# Patient Record
Sex: FEMALE | Race: WHITE | HISPANIC OR LATINO | Employment: OTHER | URBAN - METROPOLITAN AREA
[De-identification: names, ages, dates, MRNs, and addresses within clinical notes are randomized per-mention and may not be internally consistent; named-entity substitution may affect disease eponyms.]

---

## 2018-01-10 NOTE — MISCELLANEOUS
Message      Recorded as Task   Date: 01/11/2016 01:03 PM, Created By: Rich Rojo   Task Name: Call Back   Assigned To: [facility],Team   Regarding Patient: Madeleine Wood, Status: Active   Comment:    Rich Rojo - 11 Jan 2016 1:03 PM     TASK CREATED  Caller: Self; [phone] (Home)  Patient called the office requesting a refill of her pain medication  She is using 3-4 Hyrdrocodone/Acetaminophen daily as well as taking Ibuprofen for the surgical pain  Her pain is 5-6/10  She reports that the nausea and vomiting has resolved and she is eating well  She is moving her bowels regularly and her stool is formed  She deneis fever and reports that her incision is healing without signs of infection  Rich Rojo - 11 Jan 2016 4:15 PM     TASK EDITED  Dr Armstrong Exon will not renew the script for generic Vicodin  She will prescribe Tamadol 50 mg, take 1 tablet every 6 hours as needed for pain  #30/No refill  Ana Luisa Faustin - 11 Jan 2016 4:16 PM     TASK EDITED  NKDA  prescriptions processed via phone and patient informed  Active Problems    1  Post-operative nausea and vomiting (787 01) (R11 0)   2  Small bowel perforation (569 83) (K63 1)    Current Meds   1  Hydrochlorothiazide 25 MG Oral Tablet; TAKE 1 TABLET DAILY; Therapy: (XARSIMXT:69CQQ6811) to Recorded   2  Hydrocodone-Acetaminophen 5-325 MG Oral Tablet; TAKE 1 TO 2 TABLETS EVERY 4   TO 6 HOURS AS NEEDED FOR PAIN  NO MORE THAN 8 TABLETS PER   DAY; Therapy: (ZXCKIEGW:91MKC7077) to Recorded   3  Metoprolol Tartrate 50 MG Oral Tablet; TAKE 1 TABLET EVERY 12 HOURS DAILY; Therapy: (YCPCMTAD:88NVU8586) to Recorded   4  Ondansetron 4 MG Oral Tablet Dispersible; Place 1 tablet under tongue to dissolve every   6 hours as needed for nausea; Therapy: 97HKA7140 to (Last AF:42UVB1406)  Requested for: 51VBI0486 Ordered   5  Synthroid 100 MCG Oral Tablet (Levothyroxine Sodium); TAKE 1 TABLET DAILY;    Therapy: (HMBOJFIQ:86WDM5198) to Recorded   6  TraMADol HCl - 50 MG Oral Tablet; TAKE 1 TABLET EVERY 6 HOURS AS NEEDED FOR   PAIN;   Therapy: 12SFG8883 to (Evaluate:19Jan2016); Last Rx:11Jan2016; Status: ACTIVE -   Retrospective By Protocol Authorization Ordered    Allergies    1   No Known Drug Allergies    Signatures   Electronically signed by : Clay Mccain, ; Jan 12 2016  1:24PM EST                       (Author)

## 2018-01-15 NOTE — MISCELLANEOUS
Message      Recorded as Task   Date: 2016 01:03 PM, Created By: Umer Danica   Task Name: Call Back   Assigned To: [facility],Team   Regarding Patient: Kalyn Abdi, Status: Active   Comment:    Umer Mishra - 2016 1:03 PM     TASK CREATED  Caller: Self; [phone] (Home)  Patient called the office requesting a refill of her pain medication  She is using 3-4 Hyrdrocodone/Acetaminophen daily as well as taking Ibuprofen for the surgical pain  Her pain is 5-6/10  She reports that the nausea and vomiting has resolved and she is eating well  She is moving her bowels regularly and her stool is formed  She deneis fever and reports that her incision is healing without signs of infection  Kristenil Danica - 2016 4:15 PM     TASK EDITED  Dr Manpreet Curtis will not renew the script for generic Vicodin  She will prescribe Tamadol 50 mg, take 1 tablet every 6 hours as needed for pain  #30/No refill  Ana Luisa Faustin - 2016 4:16 PM     TASK EDITED  NKDA  prescription processed via phone and patient informed  Active Problems    1  Post-operative nausea and vomiting (787 01) (R11 0)   2  Small bowel perforation (569 83) (K63 1)    Current Meds   1  Hydrochlorothiazide 25 MG Oral Tablet; TAKE 1 TABLET DAILY; Therapy: (HJIJNJOO:83YUM6962) to Recorded   2  Hydrocodone-Acetaminophen 5-325 MG Oral Tablet; TAKE 1 TO 2 TABLETS EVERY 4   TO 6 HOURS AS NEEDED FOR PAIN  NO MORE THAN 8 TABLETS PER   DAY; Therapy: (VJJYMBJP:34KCY6384) to Recorded   3  Metoprolol Tartrate 50 MG Oral Tablet; TAKE 1 TABLET EVERY 12 HOURS DAILY; Therapy: (PSDSRRQH:11PCC1378) to Recorded   4  Ondansetron 4 MG Oral Tablet Dispersible; Place 1 tablet under tongue to dissolve every   6 hours as needed for nausea; Therapy: 64PIM4771 to (Last M88PMR8672)  Requested for: 18UQK3183 Ordered   5  Synthroid 100 MCG Oral Tablet (Levothyroxine Sodium); TAKE 1 TABLET DAILY;    Therapy: (KTXKUEZO:16DLD8711) to Recorded   6  TraMADol HCl - 50 MG Oral Tablet; TAKE 1 TABLET EVERY 6 HOURS AS NEEDED FOR   PAIN;   Therapy: 23ZYW8751 to (Evaluate:19Jan2016); Last Rx:11Jan2016; Status: ACTIVE -   Retrospective By Protocol Authorization Ordered    Allergies    1   No Known Drug Allergies    Signatures   Electronically signed by : Rexann Klinefelter, MD; Jan 14 2016  2:57PM EST

## 2018-01-17 NOTE — PROGRESS NOTES
Assessment    1  Post-operative nausea and vomiting (787 01) (R11 0)   2  History of Small bowel perforation (852 90) (K63 1)    Discussion/Summary    51-year-old female 2 weeks out from laparotomy with small bowel resection for foreign body and small bowel perforation  She is doing much better  Asymptomatic with only some mild abdominal pain status post laparotomy  Given that she is 2 weeks out from his laparotomy, she looks great  Education given  Questions answered  We will see her on an as-needed basis  Due for screening colonoscopy at age 50  Chief Complaint  Patient is here today for a second post op visit  She had an exploratory laparotomy, lysis of adhesions and excision of foreign body and repair of small bowel on 12/29/15  fever/chills - denies  nausea/vomiting - has resolved  appetite - good  pain - 2/10, she stopped using prescription pain meds on 1/11/16  bowels - daily loose brown stool, no blood or melena  incision - with steri strips open to air  Post-Op  HPI: Patient presents today for a post-op visit from an ex-lap with lysis of adhesions and excision of foreign body with repair of small bowel on 12/29/2015  3 days ago, patient had episodes of vomiting (per patient, lost count) and could not hold fluids down  Patient is unsure if she had any sick contacts  Apart from that episode, patient has been feeling well, noting no nausea or vomiting the past 3 days and ok appetitie  Patient reports she is able to eat most of a regular diet  Patient admits to mushy stools, nonbloody  Admits to some left-sided abdominal pain, but has not been taking anything for it  Denies fever/chills, CP, SOB, leg pain or swelling    51-year-old female status post exploratory laparotomy for perforated viscus with small bowel resection on December 29, 2015  She did have the episode of nausea and vomiting described above, but is feeling much better at this point  Minimal abdominal pain   Moving her bowels well  No fever no chills  Hospital Based Practices Required Assessment:   Pain Assessment   the patient states they have pain  The pain is located in the Abdomen, left-sided  The patient describes the pain as dull  (on a scale of 0 to 10, the patient rates the pain at 2 )       Review of Systems    Constitutional: no fever, not feeling poorly, no chills and no recent weight loss  Eyes: no eyesight problems and no dryness of the eyes  ENT: no sore throat and no hoarseness  Cardiovascular: no chest pain and no palpitations  Respiratory: no shortness of breath, no cough and no shortness of breath during exertion  Gastrointestinal: abdominal pain, but no nausea, no vomiting, no constipation, no diarrhea and no blood in stools  Musculoskeletal: no arthralgias and no myalgias  Integumentary: no rashes and no skin lesions  Neurological: no headache, no numbness and no tingling  Psychiatric: no anxiety and no depression  Hematologic/Lymphatic: no swollen glands and no swollen glands in the neck  Active Problems    1  Post-operative nausea and vomiting (787 01) (R11 0)    Past Medical History  Active Problems And Past Medical History Reviewed: The active problems and past medical history were reviewed and updated today  Surgical History  Surgical History Reviewed: The surgical history was reviewed and updated today  Social History    · Current every day smoker (305 1) (F17 200)   · No alcohol use  The social history was reviewed and updated today  Family History  Family History Reviewed: The family history was reviewed and updated today  Current Meds   1  Hydrochlorothiazide 25 MG Oral Tablet; TAKE 1 TABLET DAILY; Therapy: (BKSVWHDD:68REO1760) to Recorded   2  Hydrocodone-Acetaminophen 5-325 MG Oral Tablet; TAKE 1 TO 2 TABLETS EVERY 4 TO   6 HOURS AS NEEDED FOR PAIN  NO MORE THAN 8 TABLETS PER DAY; Therapy: (FAHSSYPN:39GPN5571) to Recorded   3   Metoprolol Tartrate 50 MG Oral Tablet; TAKE 1 TABLET EVERY 12 HOURS DAILY; Therapy: (GZMTUULD:92MDG5910) to Recorded   4  Ondansetron 4 MG Oral Tablet Dispersible; Place 1 tablet under tongue to dissolve every   6 hours as needed for nausea; Therapy: 57CTW8693 to (Last NB:90QEU6380)  Requested for: 20WND5962 Ordered   5  Synthroid 100 MCG Oral Tablet; TAKE 1 TABLET DAILY; Therapy: (JDIOGWMX:28OBI6418) to Recorded   6  TraMADol HCl - 50 MG Oral Tablet; TAKE 1 TABLET EVERY 6 HOURS AS NEEDED FOR   PAIN;   Therapy: 01PDH9754 to (Evaluate:19Jan2016); Last Rx:11Jan2016 Ordered    Allergies    1  No Known Drug Allergies    Vitals   Recorded: 65MJZ1119 01:39PM   Temperature 98 3 F, Tympanic   Heart Rate 72, L Radial   Pulse Quality Regular, L Radial   Respiration 18   Systolic 967, LUE, Sitting   Diastolic 72, LUE, Sitting   Height 5 ft 3 in   Weight 150 lb    BMI Calculated 26 57   BSA Calculated 1 71     Physical Exam    Constitutional   General appearance: No acute distress, well appearing and well nourished  Ears, Nose, Mouth, and Throat   External inspection of ears and nose: Normal     Oropharynx: Normal with no erythema, edema, exudate or lesions  Neck   Supple, symmetric, trachea midline, no masses The neck was supple  The neck was not swollen, was non-tender and was not warm  Pulmonary   Respiratory effort: No increased work of breathing or signs of respiratory distress  Auscultation of lungs: Clear to auscultation, equal breath sounds bilaterally, no wheezes, no rales, no rhonci  Cardiovascular   Auscultation of heart: Normal rate and rhythm, normal S1 and S2, without murmurs  Examination of extremities for edema and/or varicosities: Normal     Abdomen   Abdomen: Abnormal   The abdomen was rounded  Bowel sounds were normal  Skin findings: striae and a scar in the mid-line   The abdomen was soft and nontender  The abdomen was not firm and not rigid  No rebound tenderness  No guarding   1 cm mass palpated in the left upper quadrant  Lymphatic   Palpation of lymph nodes in neck: No lymphadenopathy  Musculoskeletal   Digits and nails: Normal without clubbing or cyanosis  Extremities: No clubbing, no cyanosis, no edema   Skin   Skin and subcutaneous tissue: Normal without rashes or lesions  Psychiatric   Orientation to person, place, and time: Normal     Mood and affect: Normal     Additional Exam:  Incision: healing well, no discharge, steri-strips remain present  Provider Comments  Provider Comments: This report has been generated by a voice recognition software system  Therefore, there may be syntax, spelling, and/or grammatical errors  Please call if you've any questions        Signatures   Electronically signed by : Huma Arenas, [facility]; Jan 14 2016  2:43PM EST                       (Author)    Electronically signed by : Milana Espinosa MD; Jan 14 2016  2:57PM EST

## 2018-01-17 NOTE — MISCELLANEOUS
Message      Recorded as Task   Date: 01/11/2016 01:03 PM, Created By: Chica Combs   Task Name: Call Back   Assigned To: [facility],Team   Regarding Patient: Buster Moore, Status: Active   Comment:    Chica Combs - 11 Jan 2016 1:03 PM     TASK CREATED  Caller: Self; [phone] (Home)  Patient called the office requesting a refill of her pain medication  She is using 3-4 Hyrdrocodone/Acetaminophen daily as well as taking Ibuprofen for the surgical pain  Her pain is 5-6/10  She reports that the nausea and vomiting has resolved and she is eating well  She is moving her bowels regularly and her stool is formed  She deneis fever and reports that her incision is healing without signs of infection  Chica Combs - 11 Jan 2016 4:15 PM     TASK EDITED  Dr Jeremie Gaffney will not renew the script for generic Vicodin  She will prescribe Tamadol 50 mg, take 1 tablet every 6 hours as needed for pain  #30/No refill  Ana Luisa Faustin - 11 Jan 2016 4:16 PM     TASK EDITED  NKDA  prescription processed via phone and patient informed  Active Problems    1  Post-operative nausea and vomiting (787 01) (R11 0)   2  Small bowel perforation (569 83) (K63 1)    Current Meds   1  Hydrochlorothiazide 25 MG Oral Tablet; TAKE 1 TABLET DAILY; Therapy: (FYOEMPMV:46BVZ6773) to Recorded   2  Hydrocodone-Acetaminophen 5-325 MG Oral Tablet; TAKE 1 TO 2 TABLETS EVERY 4   TO 6 HOURS AS NEEDED FOR PAIN  NO MORE THAN 8 TABLETS PER   DAY; Therapy: (BQTQWITM:46ZWU0737) to Recorded   3  Metoprolol Tartrate 50 MG Oral Tablet; TAKE 1 TABLET EVERY 12 HOURS DAILY; Therapy: (EUMQMGTW:30YEA2790) to Recorded   4  Ondansetron 4 MG Oral Tablet Dispersible; Place 1 tablet under tongue to dissolve every   6 hours as needed for nausea; Therapy: 00VZK0111 to (Last MT:48DNK0822)  Requested for: 97IFC9828 Ordered   5  Synthroid 100 MCG Oral Tablet (Levothyroxine Sodium); TAKE 1 TABLET DAILY;    Therapy: (BJULPSAT:51ZQX2643) to Recorded   6  TraMADol HCl - 50 MG Oral Tablet; TAKE 1 TABLET EVERY 6 HOURS AS NEEDED FOR   PAIN;   Therapy: 76ALX4410 to (Evaluate:19Jan2016); Last Rx:11Jan2016; Status: ACTIVE -   Retrospective By Protocol Authorization Ordered    Allergies    1   No Known Drug Allergies    Signatures   Electronically signed by : Gaurav uW MD; Jan 14 2016  2:57PM EST

## 2018-01-18 NOTE — MISCELLANEOUS
Message  I called patient because she was a no show for her appointment scheduled today at [phone]  She answered the phone and stated that she just woke up and she will not be coming into the office today  She believes that the abdominal discomfort is from "gas"  She asked to reschedule the missed appointment  Appointment given on 1/14/16 @ 9461 with SOO RETANA  Active Problems   Small bowel perforation (569 83) (K63 1)       Post-operative nausea and vomiting (787 01) (R11 0)          Current Meds   1  Hydrochlorothiazide 25 MG Oral Tablet; TAKE 1 TABLET DAILY; Therapy: (SRBUWIPE:80CWR6394) to Recorded   2  Hydrocodone-Acetaminophen 5-325 MG Oral Tablet; TAKE 1 TO 2 TABLETS EVERY 4   TO 6 HOURS AS NEEDED FOR PAIN  NO MORE THAN 8 TABLETS PER   DAY; Therapy: (GVFKDKCW:66YDA1561) to Recorded   3  Metoprolol Tartrate 50 MG Oral Tablet; TAKE 1 TABLET EVERY 12 HOURS DAILY; Therapy: (QXIDJWZM:92MZJ4989) to Recorded   4  Ondansetron 4 MG Oral Tablet Dispersible; Place 1 tablet under tongue to dissolve every   6 hours as needed for nausea; Therapy: 74MGX1671 to (Last FW:57NPM6981)  Requested for: 90YPV9129 Ordered   5  Synthroid 100 MCG Oral Tablet; TAKE 1 TABLET DAILY; Therapy: (BUIJLYCD:80UJJ7747) to Recorded   6  TraMADol HCl - 50 MG Oral Tablet; TAKE 1 TABLET EVERY 6 HOURS AS NEEDED FOR   PAIN;   Therapy: 20RDO3666 to (Evaluate:19Jan2016); Last Rx:11Jan2016; Status: ACTIVE -   Retrospective By Protocol Authorization Ordered    Allergies    1   No Known Drug Allergies    Signatures   Electronically signed by : JUANITO RETANA; Jan 22 2016  8:53AM EST                       (Author)

## 2018-04-22 ENCOUNTER — APPOINTMENT (EMERGENCY)
Dept: CT IMAGING | Facility: HOSPITAL | Age: 53
End: 2018-04-22
Payer: MEDICARE

## 2018-04-22 ENCOUNTER — APPOINTMENT (EMERGENCY)
Dept: RADIOLOGY | Facility: HOSPITAL | Age: 53
End: 2018-04-22
Payer: MEDICARE

## 2018-04-22 ENCOUNTER — HOSPITAL ENCOUNTER (EMERGENCY)
Facility: HOSPITAL | Age: 53
Discharge: HOME | End: 2018-04-22
Attending: EMERGENCY MEDICINE
Payer: MEDICARE

## 2018-04-22 VITALS
TEMPERATURE: 97.8 F | DIASTOLIC BLOOD PRESSURE: 74 MMHG | HEART RATE: 86 BPM | OXYGEN SATURATION: 98 % | RESPIRATION RATE: 18 BRPM | SYSTOLIC BLOOD PRESSURE: 130 MMHG | WEIGHT: 155 LBS | BODY MASS INDEX: 27.46 KG/M2

## 2018-04-22 DIAGNOSIS — R10.9 ACUTE ABDOMINAL PAIN: Primary | ICD-10-CM

## 2018-04-22 LAB
ALBUMIN SERPL BCP-MCNC: 4.5 G/DL (ref 3.5–5)
ALP SERPL-CCNC: 97 U/L (ref 46–116)
ALT SERPL W P-5'-P-CCNC: 40 U/L (ref 12–78)
ANION GAP SERPL CALCULATED.3IONS-SCNC: 13 MMOL/L (ref 4–13)
AST SERPL W P-5'-P-CCNC: 25 U/L (ref 5–45)
BASOPHILS # BLD AUTO: 0.02 THOUSANDS/ΜL (ref 0–0.1)
BASOPHILS NFR BLD AUTO: 0 % (ref 0–1)
BILIRUB SERPL-MCNC: 0.22 MG/DL (ref 0.2–1)
BILIRUB UR QL STRIP: NEGATIVE
BUN SERPL-MCNC: 14 MG/DL (ref 5–25)
CALCIUM SERPL-MCNC: 9.2 MG/DL (ref 8.3–10.1)
CHLORIDE SERPL-SCNC: 105 MMOL/L (ref 100–108)
CLARITY UR: CLEAR
CO2 SERPL-SCNC: 26 MMOL/L (ref 21–32)
COLOR UR: YELLOW
CREAT SERPL-MCNC: 0.8 MG/DL (ref 0.6–1.3)
EOSINOPHIL # BLD AUTO: 0.09 THOUSAND/ΜL (ref 0–0.61)
EOSINOPHIL NFR BLD AUTO: 1 % (ref 0–6)
ERYTHROCYTE [DISTWIDTH] IN BLOOD BY AUTOMATED COUNT: 13 % (ref 11.6–15.1)
GFR SERPL CREATININE-BSD FRML MDRD: 86 ML/MIN/1.73SQ M
GLUCOSE SERPL-MCNC: 107 MG/DL (ref 65–140)
GLUCOSE UR STRIP-MCNC: NEGATIVE MG/DL
HCT VFR BLD AUTO: 43.1 % (ref 34.8–46.1)
HGB BLD-MCNC: 15.2 G/DL (ref 11.5–15.4)
HGB UR QL STRIP.AUTO: NEGATIVE
KETONES UR STRIP-MCNC: NEGATIVE MG/DL
LEUKOCYTE ESTERASE UR QL STRIP: NEGATIVE
LIPASE SERPL-CCNC: 492 U/L (ref 73–393)
LYMPHOCYTES # BLD AUTO: 2.95 THOUSANDS/ΜL (ref 0.6–4.47)
LYMPHOCYTES NFR BLD AUTO: 30 % (ref 14–44)
MCH RBC QN AUTO: 34.7 PG (ref 26.8–34.3)
MCHC RBC AUTO-ENTMCNC: 35.3 G/DL (ref 31.4–37.4)
MCV RBC AUTO: 98 FL (ref 82–98)
MONOCYTES # BLD AUTO: 0.77 THOUSAND/ΜL (ref 0.17–1.22)
MONOCYTES NFR BLD AUTO: 8 % (ref 4–12)
NEUTROPHILS # BLD AUTO: 6 THOUSANDS/ΜL (ref 1.85–7.62)
NEUTS SEG NFR BLD AUTO: 61 % (ref 43–75)
NITRITE UR QL STRIP: NEGATIVE
NRBC BLD AUTO-RTO: 0 /100 WBCS
PH UR STRIP.AUTO: 5 [PH] (ref 4.5–8)
PLATELET # BLD AUTO: 303 THOUSANDS/UL (ref 149–390)
PMV BLD AUTO: 9.7 FL (ref 8.9–12.7)
POTASSIUM SERPL-SCNC: 3.5 MMOL/L (ref 3.5–5.3)
PROT SERPL-MCNC: 8.9 G/DL (ref 6.4–8.2)
PROT UR STRIP-MCNC: NEGATIVE MG/DL
RBC # BLD AUTO: 4.38 MILLION/UL (ref 3.81–5.12)
SODIUM SERPL-SCNC: 144 MMOL/L (ref 136–145)
SP GR UR STRIP.AUTO: <=1.005 (ref 1–1.03)
UROBILINOGEN UR QL STRIP.AUTO: 0.2 E.U./DL
WBC # BLD AUTO: 9.83 THOUSAND/UL (ref 4.31–10.16)

## 2018-04-22 PROCEDURE — 99285 EMERGENCY DEPT VISIT HI MDM: CPT

## 2018-04-22 PROCEDURE — 74022 RADEX COMPL AQT ABD SERIES: CPT

## 2018-04-22 PROCEDURE — 81003 URINALYSIS AUTO W/O SCOPE: CPT

## 2018-04-22 PROCEDURE — 85025 COMPLETE CBC W/AUTO DIFF WBC: CPT

## 2018-04-22 PROCEDURE — 96374 THER/PROPH/DIAG INJ IV PUSH: CPT

## 2018-04-22 PROCEDURE — 96361 HYDRATE IV INFUSION ADD-ON: CPT

## 2018-04-22 PROCEDURE — C9113 INJ PANTOPRAZOLE SODIUM, VIA: HCPCS | Performed by: EMERGENCY MEDICINE

## 2018-04-22 PROCEDURE — 36415 COLL VENOUS BLD VENIPUNCTURE: CPT

## 2018-04-22 PROCEDURE — 83690 ASSAY OF LIPASE: CPT

## 2018-04-22 PROCEDURE — 80053 COMPREHEN METABOLIC PANEL: CPT

## 2018-04-22 PROCEDURE — 96375 TX/PRO/DX INJ NEW DRUG ADDON: CPT

## 2018-04-22 PROCEDURE — 96376 TX/PRO/DX INJ SAME DRUG ADON: CPT

## 2018-04-22 PROCEDURE — 74177 CT ABD & PELVIS W/CONTRAST: CPT

## 2018-04-22 RX ORDER — MAGNESIUM HYDROXIDE/ALUMINUM HYDROXICE/SIMETHICONE 120; 1200; 1200 MG/30ML; MG/30ML; MG/30ML
30 SUSPENSION ORAL ONCE
Status: COMPLETED | OUTPATIENT
Start: 2018-04-22 | End: 2018-04-22

## 2018-04-22 RX ORDER — HYDROCHLOROTHIAZIDE 25 MG/1
1 TABLET ORAL DAILY
COMMUNITY
End: 2018-05-16

## 2018-04-22 RX ORDER — ONDANSETRON 2 MG/ML
4 INJECTION INTRAMUSCULAR; INTRAVENOUS ONCE
Status: COMPLETED | OUTPATIENT
Start: 2018-04-22 | End: 2018-04-22

## 2018-04-22 RX ORDER — PANTOPRAZOLE SODIUM 40 MG/1
40 TABLET, DELAYED RELEASE ORAL
Status: ON HOLD | COMMUNITY
Start: 2017-12-14 | End: 2019-05-08 | Stop reason: SDUPTHER

## 2018-04-22 RX ORDER — ONDANSETRON 4 MG/1
4 TABLET, ORALLY DISINTEGRATING ORAL EVERY 6 HOURS PRN
Qty: 8 TABLET | Refills: 0 | Status: SHIPPED | OUTPATIENT
Start: 2018-04-22 | End: 2018-05-16

## 2018-04-22 RX ORDER — FENTANYL CITRATE 50 UG/ML
100 INJECTION, SOLUTION INTRAMUSCULAR; INTRAVENOUS ONCE
Status: COMPLETED | OUTPATIENT
Start: 2018-04-22 | End: 2018-04-22

## 2018-04-22 RX ORDER — MORPHINE SULFATE 4 MG/ML
4 INJECTION, SOLUTION INTRAMUSCULAR; INTRAVENOUS ONCE
Status: COMPLETED | OUTPATIENT
Start: 2018-04-22 | End: 2018-04-22

## 2018-04-22 RX ORDER — PANTOPRAZOLE SODIUM 40 MG/1
40 INJECTION, POWDER, FOR SOLUTION INTRAVENOUS ONCE
Status: COMPLETED | OUTPATIENT
Start: 2018-04-22 | End: 2018-04-22

## 2018-04-22 RX ORDER — METOPROLOL TARTRATE 50 MG/1
1 TABLET, FILM COATED ORAL EVERY 12 HOURS
Status: ON HOLD | COMMUNITY
End: 2019-09-26 | Stop reason: SDUPTHER

## 2018-04-22 RX ORDER — ONDANSETRON 4 MG/1
TABLET, ORALLY DISINTEGRATING ORAL
COMMUNITY
Start: 2016-01-08 | End: 2018-06-19

## 2018-04-22 RX ORDER — SUCRALFATE 1 G/1
1 TABLET ORAL
Qty: 56 TABLET | Refills: 0 | Status: ON HOLD | OUTPATIENT
Start: 2018-04-22 | End: 2019-05-23

## 2018-04-22 RX ORDER — ASPIRIN 81 MG/1
81 TABLET, CHEWABLE ORAL
COMMUNITY
Start: 2017-12-14 | End: 2018-05-16

## 2018-04-22 RX ADMIN — ONDANSETRON 4 MG: 2 INJECTION INTRAMUSCULAR; INTRAVENOUS at 11:15

## 2018-04-22 RX ADMIN — PANTOPRAZOLE SODIUM 40 MG: 40 INJECTION, POWDER, FOR SOLUTION INTRAVENOUS at 14:13

## 2018-04-22 RX ADMIN — SODIUM CHLORIDE 1000 ML: 0.9 INJECTION, SOLUTION INTRAVENOUS at 11:18

## 2018-04-22 RX ADMIN — MORPHINE SULFATE 4 MG: 4 INJECTION, SOLUTION INTRAMUSCULAR; INTRAVENOUS at 12:14

## 2018-04-22 RX ADMIN — LIDOCAINE HYDROCHLORIDE 10 ML: 20 SOLUTION ORAL; TOPICAL at 14:12

## 2018-04-22 RX ADMIN — ALUMINUM HYDROXIDE, MAGNESIUM HYDROXIDE, AND SIMETHICONE 30 ML: 200; 200; 20 SUSPENSION ORAL at 14:12

## 2018-04-22 RX ADMIN — ONDANSETRON 4 MG: 2 INJECTION INTRAMUSCULAR; INTRAVENOUS at 13:01

## 2018-04-22 RX ADMIN — IOHEXOL 50 ML: 240 INJECTION, SOLUTION INTRATHECAL; INTRAVASCULAR; INTRAVENOUS; ORAL at 13:15

## 2018-04-22 RX ADMIN — FENTANYL CITRATE 100 MCG: 50 INJECTION INTRAMUSCULAR; INTRAVENOUS at 11:19

## 2018-04-22 RX ADMIN — IOHEXOL 100 ML: 350 INJECTION, SOLUTION INTRAVENOUS at 13:16

## 2018-04-22 NOTE — DISCHARGE INSTRUCTIONS
Gastritis   WHAT YOU NEED TO KNOW:   Gastritis is inflammation or irritation of the lining of your stomach  DISCHARGE INSTRUCTIONS:   Call 911 for any of the following:   · You develop chest pain or shortness of breath  Return to the emergency department if:   · You vomit blood  · You have black or bloody bowel movements  · You have severe stomach or back pain  Contact your healthcare provider if:   · You have a fever  · You have new or worsening symptoms, even after treatment  · You have questions or concerns about your condition or care  Medicines:     Take your medicine as directed  Manage or prevent gastritis:   · Do not smoke  Nicotine and other chemicals in cigarettes and cigars can make your symptoms worse and cause lung damage  Ask your healthcare provider for information if you currently smoke and need help to quit  E-cigarettes or smokeless tobacco still contain nicotine  Talk to your healthcare provider before you use these products  · Do not drink alcohol  Alcohol can prevent healing and make your gastritis worse  Talk to your healthcare provider if you need help to stop drinking  · Do not take NSAIDs or aspirin unless directed  These and similar medicines can cause irritation  If your healthcare provider says it is okay to take NSAIDs, take them with food  · Do not eat foods that cause irritation  Foods such as oranges and salsa can cause burning or pain  Eat a variety of healthy foods  Examples include fruits (not citrus), vegetables, low-fat dairy products, beans, whole-grain breads, and lean meats and fish  Try to eat small meals, and drink water with your meals  Do not eat for at least 3 hours before you go to bed

## 2018-04-22 NOTE — ED PROVIDER NOTES
History  Chief Complaint   Patient presents with    Abdominal Pain     Pt reports abdominal pain and distention starting morning  Noticed a difference in BM for the past 3 days  Has a hx of obstructions  States has had 5 bowel surgeries  51 yo female with h/o previous bowel obstructions, c/o onset of abdominal pain and distension last night after attending a baby shower where she was eating the snack food and drank alcohol, since then c/o nausea without vomiting  She affirms she is passing flatus  She says she tends to normally have loose stools since her previous bowel surgery, and since last night she actually more formed, smaller volume which she considers abnormal           History provided by:  Patient  Abdominal Pain   Pain location:  Generalized  Pain quality: aching and bloating    Pain radiates to:  Does not radiate  Pain severity:  Severe  Onset quality:  Gradual  Duration:  12 hours  Timing:  Constant  Progression:  Worsening  Chronicity:  New  Context: previous surgery (history of hysterectomy, appendectomy, and multiple bowel obstructions)    Context: not recent illness    Relieved by:  Nothing  Worsened by: Movement (lying flat)  Ineffective treatments:  None tried  Associated symptoms: nausea    Associated symptoms: no anorexia, no chest pain, no chills, no cough, no diarrhea, no dysuria, no fever, no hematuria, no shortness of breath, no sore throat and no vomiting    Risk factors: multiple surgeries        Prior to Admission Medications   Prescriptions Last Dose Informant Patient Reported?  Taking?   aspirin 81 mg chewable tablet   Yes Yes   Sig: Chew 81 mg   hydrochlorothiazide (HYDRODIURIL) 25 mg tablet   Yes No   Sig: Take 1 tablet by mouth daily   metoprolol tartrate (LOPRESSOR) 50 mg tablet   Yes No   Sig: Take 1 tablet by mouth every 12 (twelve) hours   ondansetron (ZOFRAN-ODT) 4 mg disintegrating tablet   Yes Yes   Sig: Take by mouth   pantoprazole (PROTONIX) 40 mg tablet   Yes Yes Sig: Take 40 mg by mouth      Facility-Administered Medications: None       Past Medical History:   Diagnosis Date    Bowel obstruction ([address] )     Hypertension        Past Surgical History:   Procedure Laterality Date    BOWEL RESECTION         History reviewed  No pertinent family history  I have reviewed and agree with the history as documented  Social History   Substance Use Topics    Smoking status: Current Every Day Smoker     Packs/day: 0 50     Types: Cigarettes    Smokeless tobacco: Never Used    Alcohol use No        Review of Systems   Constitutional: Positive for appetite change  Negative for chills and fever  HENT: Negative for sore throat  Respiratory: Negative for cough, shortness of breath and wheezing  Cardiovascular: Negative for chest pain and palpitations  Gastrointestinal: Positive for abdominal pain and nausea  Negative for anorexia, diarrhea and vomiting  Genitourinary: Negative for dysuria and hematuria  Musculoskeletal: Negative for neck pain  Skin: Negative for rash  Neurological: Negative for dizziness, weakness and headaches  Psychiatric/Behavioral: Negative for suicidal ideas  All other systems reviewed and are negative  Physical Exam  ED Triage Vitals [04/22/18 1026]   Temperature Pulse Respirations Blood Pressure SpO2   97 8 °F (36 6 °C) (!) 122 18 (!) 177/89 98 %      Temp Source Heart Rate Source Patient Position - Orthostatic VS BP Location FiO2 (%)   Oral Monitor Sitting Right arm --      Pain Score       Worst Possible Pain           Orthostatic Vital Signs  Vitals:    04/22/18 1026 04/22/18 1129 04/22/18 1305   BP: (!) 177/89 140/80 131/75   Pulse: (!) 122 94 88   Patient Position - Orthostatic VS: Sitting Sitting Standing       Physical Exam   Constitutional: She is oriented to person, place, and time  Vital signs are normal  She appears well-developed and well-nourished  Non-toxic appearance  She appears distressed     Appears uncomfortable, does not want to lie back   HENT:   Head: Normocephalic and atraumatic  Right Ear: Tympanic membrane and external ear normal    Left Ear: Tympanic membrane and external ear normal    Nose: Nose normal    Mouth/Throat: Oropharynx is clear and moist    Eyes: Conjunctivae and EOM are normal  Pupils are equal, round, and reactive to light  Neck: Normal range of motion and full passive range of motion without pain  Neck supple  No Brudzinski's sign and no Kernig's sign noted  Cardiovascular: Normal rate, regular rhythm, normal heart sounds, intact distal pulses and normal pulses  No murmur heard  Pulmonary/Chest: Effort normal and breath sounds normal  No tachypnea  No respiratory distress  She has no wheezes  Abdominal: She exhibits distension (tympanic)  Bowel sounds are increased  There is generalized tenderness  There is guarding  There is no rigidity and no rebound  No hernia  Musculoskeletal: Normal range of motion  Right lower leg: She exhibits no swelling  Left lower leg: She exhibits no swelling  Lymphadenopathy:     She has no cervical adenopathy  Neurological: She is alert and oriented to person, place, and time  She has normal strength and normal reflexes  No cranial nerve deficit or sensory deficit  Coordination and gait normal  GCS eye subscore is 4  GCS verbal subscore is 5  GCS motor subscore is 6  Skin: Skin is warm and dry  No rash noted  She is not diaphoretic  No pallor  Psychiatric: She has a normal mood and affect  Her speech is normal and behavior is normal  Judgment and thought content normal  Cognition and memory are normal    Nursing note and vitals reviewed        ED Medications  Medications   sodium chloride 0 9 % bolus 1,000 mL (1,000 mL Intravenous New Bag 4/22/18 1118)   ondansetron (ZOFRAN) injection 4 mg (4 mg Intravenous Given 4/22/18 1115)   fentanyl citrate (PF) 100 MCG/2ML 100 mcg (100 mcg Intravenous Given 4/22/18 1119)   morphine (PF) 4 mg/mL injection 4 mg (4 mg Intravenous Given 4/22/18 1214)   ondansetron (ZOFRAN) injection 4 mg (4 mg Intravenous Given 4/22/18 1301)   iohexol (OMNIPAQUE) 350 MG/ML injection (SINGLE-DOSE) 100 mL (100 mL Intravenous Given 4/22/18 1316)   iohexol (OMNIPAQUE) 240 MG/ML solution 50 mL (50 mL Oral Given 4/22/18 1315)   pantoprazole (PROTONIX) injection 40 mg (40 mg Intravenous Given 4/22/18 1413)   lidocaine viscous (XYLOCAINE) 2 % mucosal solution 10 mL (10 mL Swish & Swallow Given 4/22/18 1412)   aluminum-magnesium hydroxide-simethicone (MYLANTA) 200-200-20 mg/5 mL oral suspension 30 mL (30 mL Oral Given 4/22/18 1412)       Diagnostic Studies  Results Reviewed     Procedure Component Value Units Date/Time    ED Urine Macroscopic [[id number]]  (Normal) Collected:  04/22/18 1107    Lab Status:  Final result Specimen:  Urine Updated:  04/22/18 1107     Color, UA Yellow     Clarity, UA Clear     pH, UA 5 0     Leukocytes, UA Negative     Nitrite, UA Negative     Protein, UA Negative mg/dl      Glucose, UA Negative mg/dl      Ketones, UA Negative mg/dl      Urobilinogen, UA 0 2 E U /dl      Bilirubin, UA Negative     Blood, UA Negative     Specific Gravity, UA <=1 005    Narrative:       CLINITEK RESULT    Comprehensive metabolic panel [[id number]]  (Abnormal) Collected:  04/22/18 1044    Lab Status:  Final result Specimen:  Blood from Arm, Left Updated:  04/22/18 1104     Sodium 144 mmol/L      Potassium 3 5 mmol/L      Chloride 105 mmol/L      CO2 26 mmol/L      Anion Gap 13 mmol/L      BUN 14 mg/dL      Creatinine 0 80 mg/dL      Glucose 107 mg/dL      Calcium 9 2 mg/dL      AST 25 U/L      ALT 40 U/L      Alkaline Phosphatase 97 U/L      Total Protein 8 9 (H) g/dL      Albumin 4 5 g/dL      Total Bilirubin 0 22 mg/dL      eGFR 86 ml/min/1 73sq m     Narrative:         National Kidney Disease Education Program recommendations are as follows:  GFR calculation is accurate only with a steady state creatinine  Chronic Kidney disease less than 60 ml/min/1 73 sq  meters  Kidney failure less than 15 ml/min/1 73 sq  meters  Lipase [[id number]]  (Abnormal) Collected:  04/22/18 1044    Lab Status:  Final result Specimen:  Blood from Arm, Left Updated:  04/22/18 1104     Lipase 492 (H) u/L     CBC and differential [[id number]]  (Abnormal) Collected:  04/22/18 1044    Lab Status:  Final result Specimen:  Blood from Arm, Left Updated:  04/22/18 1049     WBC 9 83 Thousand/uL      RBC 4 38 Million/uL      Hemoglobin 15 2 g/dL      Hematocrit 43 1 %      MCV 98 fL      MCH 34 7 (H) pg      MCHC 35 3 g/dL      RDW 13 0 %      MPV 9 7 fL      Platelets 796 Thousands/uL      nRBC 0 /100 WBCs      Neutrophils Relative 61 %      Lymphocytes Relative 30 %      Monocytes Relative 8 %      Eosinophils Relative 1 %      Basophils Relative 0 %      Neutrophils Absolute 6 00 Thousands/µL      Lymphocytes Absolute 2 95 Thousands/µL      Monocytes Absolute 0 77 Thousand/µL      Eosinophils Absolute 0 09 Thousand/µL      Basophils Absolute 0 02 Thousands/µL                  CT abdomen pelvis with contrast   Final Result by Eufemia Sterling MD (04/22 6597)      No acute inflammatory findings within the abdomen or pelvis  No bowel obstruction; the administered oral contrast has extended into the mid to distal small bowel  Workstation performed: [id number]         XR abdomen obstruction series   ED Interpretation by La Gaviria MD (04/22 1058)   No air fluid levels seen    Proceed with CT for abdominal pain                 Procedures  Procedures       Phone Contacts  ED Phone Contact    ED Course  ED Course as of Apr 22 1453   Sun Apr 22, 2018   1113 She is already doing better with single dose of pain medicine, and is much less distress, up and walking now to "help her intestines "  She has mildly elevated lipase, and xray was negative for any obstructive pattern, so I am doing CT with contrast for both occult obstruction and pancreatitis 12 CT is negative and lipase only mildly elevated  Her distension is corresponding more to bloating and no obstruction  She give h/o gastritis, which I am inclined to treat her for without needing admission, likely prompted by the alcohol last night, which is not a usual thing, and dietary indiscretions at the party  [facility]  CritCare Time    Disposition  Final diagnoses:   Acute abdominal pain - probable gasritis     Time reflects when diagnosis was documented in both MDM as applicable and the Disposition within this note     Time User Action Codes Description Comment    4/22/2018  2:51 PM Adolphus Dubin L Add [R10 9] Acute abdominal pain     4/22/2018  2:51 PM Tami Kerline Modify [R10 9] Acute abdominal pain probable gasritis      ED Disposition     ED Disposition Condition Comment    Discharge  Jayshree Lamb discharge to home/self care      Condition at discharge: Good        Follow-up Information     Follow up With Specialties Details Why Contact Info Additional [address], YONG Nurse Practitioner Schedule an appointment as soon as possible for a visit For followup, As needed [address] [facility] [id number]  [facility] Department Emergency Medicine  If symptoms worsen Luiz Ricketts [address] [place] [facility], [address] [facility] [address]  , [place], [place], [address]        Patient's Medications   Discharge Prescriptions    ONDANSETRON (ZOFRAN-ODT) 4 MG DISINTEGRATING TABLET    Take 1 tablet (4 mg total) by mouth every 6 (six) hours as needed for nausea or vomiting       Start Date: 4/22/2018 End Date: --       Order Dose: 4 mg       Quantity: 8 tablet    Refills: 0    SUCRALFATE (CARAFATE) 1 G TABLET    Take 1 tablet (1 g total) by mouth 4 (four) times a day (before meals and at bedtime) for 14 days       Start Date: 4/22/2018 End Date: 5/6/2018       Order Dose: 1 g       Quantity: 56 tablet    Refills: 0     No discharge procedures on file      ED Provider  Electronically Signed by           Ramon Champagne MD  04/22/18 7721

## 2018-04-22 NOTE — ED NOTES
Patient ambulatory in halls through time in ED , patient states she cannot sit, sitting causes more pain     Missy Holloway RN  04/22/18 4785

## 2018-05-16 ENCOUNTER — APPOINTMENT (EMERGENCY)
Dept: CT IMAGING | Facility: HOSPITAL | Age: 53
DRG: 392 | End: 2018-05-16
Payer: MEDICARE

## 2018-05-16 ENCOUNTER — HOSPITAL ENCOUNTER (INPATIENT)
Facility: HOSPITAL | Age: 53
LOS: 2 days | Discharge: HOME | DRG: 392 | End: 2018-05-18
Attending: EMERGENCY MEDICINE | Admitting: HOSPITALIST
Payer: MEDICARE

## 2018-05-16 DIAGNOSIS — E87.1 ACUTE HYPONATREMIA: ICD-10-CM

## 2018-05-16 DIAGNOSIS — K29.00 OTHER ACUTE GASTRITIS WITHOUT HEMORRHAGE: ICD-10-CM

## 2018-05-16 DIAGNOSIS — R10.13 ACUTE EPIGASTRIC PAIN: Primary | ICD-10-CM

## 2018-05-16 DIAGNOSIS — K27.9 PEPTIC ULCER DISEASE: ICD-10-CM

## 2018-05-16 PROBLEM — I10 ESSENTIAL HYPERTENSION: Status: ACTIVE | Noted: 2018-05-16

## 2018-05-16 PROBLEM — E86.0 DEHYDRATION: Status: ACTIVE | Noted: 2018-05-16

## 2018-05-16 PROBLEM — F41.9 ANXIETY: Status: ACTIVE | Noted: 2018-05-16

## 2018-05-16 LAB
ALBUMIN SERPL BCP-MCNC: 4.2 G/DL (ref 3.5–5)
ALP SERPL-CCNC: 79 U/L (ref 46–116)
ALT SERPL W P-5'-P-CCNC: 27 U/L (ref 12–78)
ANION GAP SERPL CALCULATED.3IONS-SCNC: 12 MMOL/L (ref 4–13)
AST SERPL W P-5'-P-CCNC: 33 U/L (ref 5–45)
ATRIAL RATE: 71 BPM
BASOPHILS # BLD AUTO: 0.03 THOUSANDS/ΜL (ref 0–0.1)
BASOPHILS NFR BLD AUTO: 0 % (ref 0–1)
BILIRUB DIRECT SERPL-MCNC: 0.12 MG/DL (ref 0–0.2)
BILIRUB SERPL-MCNC: 0.95 MG/DL (ref 0.2–1)
BILIRUB UR QL STRIP: NEGATIVE
BUN SERPL-MCNC: 8 MG/DL (ref 5–25)
CALCIUM SERPL-MCNC: 9.5 MG/DL (ref 8.3–10.1)
CHLORIDE SERPL-SCNC: 85 MMOL/L (ref 100–108)
CLARITY UR: CLEAR
CO2 SERPL-SCNC: 28 MMOL/L (ref 21–32)
COLOR UR: YELLOW
COLOR, POC: YELLOW
CREAT SERPL-MCNC: 0.73 MG/DL (ref 0.6–1.3)
EOSINOPHIL # BLD AUTO: 0.08 THOUSAND/ΜL (ref 0–0.61)
EOSINOPHIL NFR BLD AUTO: 1 % (ref 0–6)
ERYTHROCYTE [DISTWIDTH] IN BLOOD BY AUTOMATED COUNT: 12.5 % (ref 11.6–15.1)
GFR SERPL CREATININE-BSD FRML MDRD: 96 ML/MIN/1.73SQ M
GLUCOSE SERPL-MCNC: 173 MG/DL (ref 65–140)
GLUCOSE UR STRIP-MCNC: NEGATIVE MG/DL
HCT VFR BLD AUTO: 42.9 % (ref 34.8–46.1)
HGB BLD-MCNC: 15.7 G/DL (ref 11.5–15.4)
HGB UR QL STRIP.AUTO: NEGATIVE
IMM GRANULOCYTES # BLD AUTO: 0.05 THOUSAND/UL (ref 0–0.2)
IMM GRANULOCYTES NFR BLD AUTO: 0 % (ref 0–2)
KETONES UR STRIP-MCNC: NEGATIVE MG/DL
LACTATE SERPL-SCNC: 1.3 MMOL/L (ref 0.5–2)
LEUKOCYTE ESTERASE UR QL STRIP: NEGATIVE
LIPASE SERPL-CCNC: 196 U/L (ref 73–393)
LYMPHOCYTES # BLD AUTO: 2.28 THOUSANDS/ΜL (ref 0.6–4.47)
LYMPHOCYTES NFR BLD AUTO: 20 % (ref 14–44)
MCH RBC QN AUTO: 34.6 PG (ref 26.8–34.3)
MCHC RBC AUTO-ENTMCNC: 36.6 G/DL (ref 31.4–37.4)
MCV RBC AUTO: 95 FL (ref 82–98)
MONOCYTES # BLD AUTO: 0.82 THOUSAND/ΜL (ref 0.17–1.22)
MONOCYTES NFR BLD AUTO: 7 % (ref 4–12)
NEUTROPHILS # BLD AUTO: 8.14 THOUSANDS/ΜL (ref 1.85–7.62)
NEUTS SEG NFR BLD AUTO: 72 % (ref 43–75)
NITRITE UR QL STRIP: NEGATIVE
NRBC BLD AUTO-RTO: 0 /100 WBCS
P AXIS: 67 DEGREES
PH UR STRIP.AUTO: 6.5 [PH] (ref 4.5–8)
PLATELET # BLD AUTO: 278 THOUSANDS/UL (ref 149–390)
PMV BLD AUTO: 10.1 FL (ref 8.9–12.7)
POTASSIUM SERPL-SCNC: 3.4 MMOL/L (ref 3.5–5.3)
PR INTERVAL: 214 MS
PROT SERPL-MCNC: 8.4 G/DL (ref 6.4–8.2)
PROT UR STRIP-MCNC: NEGATIVE MG/DL
QRS AXIS: 54 DEGREES
QRSD INTERVAL: 76 MS
QT INTERVAL: 416 MS
QTC INTERVAL: 452 MS
RBC # BLD AUTO: 4.54 MILLION/UL (ref 3.81–5.12)
SODIUM SERPL-SCNC: 125 MMOL/L (ref 136–145)
SP GR UR STRIP.AUTO: 1.01 (ref 1–1.03)
T WAVE AXIS: 5 DEGREES
TROPONIN I SERPL-MCNC: <0.02 NG/ML
TSH SERPL DL<=0.05 MIU/L-ACNC: 2.17 UIU/ML (ref 0.36–3.74)
UROBILINOGEN UR QL STRIP.AUTO: 0.2 E.U./DL
VENTRICULAR RATE: 71 BPM
WBC # BLD AUTO: 11.35 THOUSAND/UL (ref 4.31–10.16)

## 2018-05-16 PROCEDURE — 80048 BASIC METABOLIC PNL TOTAL CA: CPT | Performed by: EMERGENCY MEDICINE

## 2018-05-16 PROCEDURE — 83605 ASSAY OF LACTIC ACID: CPT | Performed by: EMERGENCY MEDICINE

## 2018-05-16 PROCEDURE — 36415 COLL VENOUS BLD VENIPUNCTURE: CPT | Performed by: EMERGENCY MEDICINE

## 2018-05-16 PROCEDURE — 96361 HYDRATE IV INFUSION ADD-ON: CPT

## 2018-05-16 PROCEDURE — 81002 URINALYSIS NONAUTO W/O SCOPE: CPT | Performed by: EMERGENCY MEDICINE

## 2018-05-16 PROCEDURE — 93010 ELECTROCARDIOGRAM REPORT: CPT | Performed by: INTERNAL MEDICINE

## 2018-05-16 PROCEDURE — 99223 1ST HOSP IP/OBS HIGH 75: CPT | Performed by: HOSPITALIST

## 2018-05-16 PROCEDURE — 96375 TX/PRO/DX INJ NEW DRUG ADDON: CPT

## 2018-05-16 PROCEDURE — 96374 THER/PROPH/DIAG INJ IV PUSH: CPT

## 2018-05-16 PROCEDURE — 83690 ASSAY OF LIPASE: CPT | Performed by: EMERGENCY MEDICINE

## 2018-05-16 PROCEDURE — 93005 ELECTROCARDIOGRAM TRACING: CPT

## 2018-05-16 PROCEDURE — 84443 ASSAY THYROID STIM HORMONE: CPT | Performed by: EMERGENCY MEDICINE

## 2018-05-16 PROCEDURE — 84484 ASSAY OF TROPONIN QUANT: CPT | Performed by: EMERGENCY MEDICINE

## 2018-05-16 PROCEDURE — C9113 INJ PANTOPRAZOLE SODIUM, VIA: HCPCS | Performed by: HOSPITALIST

## 2018-05-16 PROCEDURE — 81003 URINALYSIS AUTO W/O SCOPE: CPT

## 2018-05-16 PROCEDURE — 99285 EMERGENCY DEPT VISIT HI MDM: CPT

## 2018-05-16 PROCEDURE — 80076 HEPATIC FUNCTION PANEL: CPT | Performed by: EMERGENCY MEDICINE

## 2018-05-16 PROCEDURE — 74177 CT ABD & PELVIS W/CONTRAST: CPT

## 2018-05-16 PROCEDURE — 85025 COMPLETE CBC W/AUTO DIFF WBC: CPT | Performed by: EMERGENCY MEDICINE

## 2018-05-16 RX ORDER — ONDANSETRON 2 MG/ML
4 INJECTION INTRAMUSCULAR; INTRAVENOUS ONCE
Status: COMPLETED | OUTPATIENT
Start: 2018-05-16 | End: 2018-05-16

## 2018-05-16 RX ORDER — AMLODIPINE BESYLATE 5 MG/1
5 TABLET ORAL DAILY
Status: ON HOLD | COMMUNITY
End: 2019-09-26 | Stop reason: SDUPTHER

## 2018-05-16 RX ORDER — ACETAMINOPHEN 325 MG/1
650 TABLET ORAL EVERY 6 HOURS PRN
Status: DISCONTINUED | OUTPATIENT
Start: 2018-05-16 | End: 2018-05-18 | Stop reason: HOSPADM

## 2018-05-16 RX ORDER — CITALOPRAM 20 MG/1
20 TABLET ORAL DAILY
COMMUNITY
End: 2019-05-08 | Stop reason: HOSPADM

## 2018-05-16 RX ORDER — ONDANSETRON 2 MG/ML
4 INJECTION INTRAMUSCULAR; INTRAVENOUS EVERY 4 HOURS PRN
Status: DISCONTINUED | OUTPATIENT
Start: 2018-05-16 | End: 2018-05-18 | Stop reason: HOSPADM

## 2018-05-16 RX ORDER — KETOROLAC TROMETHAMINE 30 MG/ML
15 INJECTION, SOLUTION INTRAMUSCULAR; INTRAVENOUS ONCE
Status: COMPLETED | OUTPATIENT
Start: 2018-05-16 | End: 2018-05-16

## 2018-05-16 RX ORDER — SODIUM CHLORIDE 9 MG/ML
125 INJECTION, SOLUTION INTRAVENOUS CONTINUOUS
Status: DISCONTINUED | OUTPATIENT
Start: 2018-05-16 | End: 2018-05-17

## 2018-05-16 RX ORDER — LEVOTHYROXINE SODIUM 0.05 MG/1
50 TABLET ORAL DAILY
Status: ON HOLD | COMMUNITY
End: 2019-09-26 | Stop reason: SDUPTHER

## 2018-05-16 RX ORDER — SUCRALFATE 1 G/1
1 TABLET ORAL
Status: DISCONTINUED | OUTPATIENT
Start: 2018-05-16 | End: 2018-05-18 | Stop reason: HOSPADM

## 2018-05-16 RX ORDER — AMLODIPINE BESYLATE 5 MG/1
5 TABLET ORAL DAILY
Status: DISCONTINUED | OUTPATIENT
Start: 2018-05-17 | End: 2018-05-18 | Stop reason: HOSPADM

## 2018-05-16 RX ORDER — LEVOTHYROXINE SODIUM 0.05 MG/1
50 TABLET ORAL
Status: DISCONTINUED | OUTPATIENT
Start: 2018-05-17 | End: 2018-05-18 | Stop reason: HOSPADM

## 2018-05-16 RX ORDER — HYDRALAZINE HYDROCHLORIDE 20 MG/ML
10 INJECTION INTRAMUSCULAR; INTRAVENOUS EVERY 6 HOURS PRN
Status: DISCONTINUED | OUTPATIENT
Start: 2018-05-16 | End: 2018-05-18 | Stop reason: HOSPADM

## 2018-05-16 RX ORDER — CITALOPRAM 20 MG/1
20 TABLET ORAL DAILY
Status: DISCONTINUED | OUTPATIENT
Start: 2018-05-17 | End: 2018-05-18 | Stop reason: HOSPADM

## 2018-05-16 RX ORDER — SUCRALFATE ORAL 1 G/10ML
1000 SUSPENSION ORAL ONCE
Status: COMPLETED | OUTPATIENT
Start: 2018-05-16 | End: 2018-05-16

## 2018-05-16 RX ORDER — PANTOPRAZOLE SODIUM 40 MG/1
40 INJECTION, POWDER, FOR SOLUTION INTRAVENOUS EVERY 12 HOURS SCHEDULED
Status: DISCONTINUED | OUTPATIENT
Start: 2018-05-16 | End: 2018-05-18 | Stop reason: HOSPADM

## 2018-05-16 RX ORDER — PANTOPRAZOLE SODIUM 40 MG/1
40 TABLET, DELAYED RELEASE ORAL
Status: DISCONTINUED | OUTPATIENT
Start: 2018-05-16 | End: 2018-05-16

## 2018-05-16 RX ORDER — FAMOTIDINE 20 MG/1
20 TABLET, FILM COATED ORAL ONCE
Status: COMPLETED | OUTPATIENT
Start: 2018-05-16 | End: 2018-05-16

## 2018-05-16 RX ORDER — LISINOPRIL 20 MG/1
20 TABLET ORAL DAILY
Status: DISCONTINUED | OUTPATIENT
Start: 2018-05-17 | End: 2018-05-17

## 2018-05-16 RX ORDER — KETOROLAC TROMETHAMINE 30 MG/ML
15 INJECTION, SOLUTION INTRAMUSCULAR; INTRAVENOUS EVERY 6 HOURS PRN
Status: DISCONTINUED | OUTPATIENT
Start: 2018-05-16 | End: 2018-05-17

## 2018-05-16 RX ORDER — NICOTINE 21 MG/24HR
1 PATCH, TRANSDERMAL 24 HOURS TRANSDERMAL DAILY
Status: DISCONTINUED | OUTPATIENT
Start: 2018-05-17 | End: 2018-05-18 | Stop reason: HOSPADM

## 2018-05-16 RX ORDER — METOPROLOL TARTRATE 50 MG/1
50 TABLET, FILM COATED ORAL EVERY 12 HOURS
Status: DISCONTINUED | OUTPATIENT
Start: 2018-05-16 | End: 2018-05-18 | Stop reason: HOSPADM

## 2018-05-16 RX ADMIN — SUCRALFATE 1 G: 1 TABLET ORAL at 21:44

## 2018-05-16 RX ADMIN — PANTOPRAZOLE SODIUM 40 MG: 40 INJECTION, POWDER, FOR SOLUTION INTRAVENOUS at 21:46

## 2018-05-16 RX ADMIN — ACETAMINOPHEN 650 MG: 325 TABLET ORAL at 19:23

## 2018-05-16 RX ADMIN — SODIUM CHLORIDE 125 ML/HR: 0.9 INJECTION, SOLUTION INTRAVENOUS at 16:58

## 2018-05-16 RX ADMIN — KETOROLAC TROMETHAMINE 15 MG: 30 INJECTION, SOLUTION INTRAMUSCULAR at 12:58

## 2018-05-16 RX ADMIN — IOHEXOL 100 ML: 350 INJECTION, SOLUTION INTRAVENOUS at 14:36

## 2018-05-16 RX ADMIN — METOPROLOL TARTRATE 50 MG: 50 TABLET ORAL at 16:57

## 2018-05-16 RX ADMIN — ONDANSETRON 4 MG: 2 INJECTION INTRAMUSCULAR; INTRAVENOUS at 12:58

## 2018-05-16 RX ADMIN — SUCRALFATE 1000 MG: 1 SUSPENSION ORAL at 12:59

## 2018-05-16 RX ADMIN — SODIUM CHLORIDE 1000 ML: 0.9 INJECTION, SOLUTION INTRAVENOUS at 12:59

## 2018-05-16 RX ADMIN — SUCRALFATE 1 G: 1 TABLET ORAL at 16:57

## 2018-05-16 RX ADMIN — FAMOTIDINE 20 MG: 20 TABLET ORAL at 12:59

## 2018-05-16 NOTE — ED PROVIDER NOTES
History  Chief Complaint   Patient presents with    Epigastric Pain     Epigastric pain yesterday with vomiting, hx gastritis  Pt reports feeling very fatigued today  History provided by:  Patient   used: No    Abdominal Pain   Pain location:  Epigastric  Pain quality: gnawing    Pain radiates to:  Back  Pain severity:  Moderate  Onset quality:  Gradual  Timing:  Intermittent  Progression:  Waxing and waning  Chronicity:  New  Relieved by:  Nothing  Worsened by:  Nothing  Ineffective treatments:  None tried  Associated symptoms: fatigue, nausea and vomiting    Associated symptoms: no chest pain, no cough, no diarrhea, no dysuria, no fever, no shortness of breath and no sore throat        Prior to Admission Medications   Prescriptions Last Dose Informant Patient Reported? Taking? amLODIPine (NORVASC) 5 mg tablet   Yes Yes   Sig: Take 5 mg by mouth daily   citalopram (CeleXA) 20 mg tablet   Yes Yes   Sig: Take 20 mg by mouth daily   levothyroxine 50 mcg tablet   Yes Yes   Sig: Take 50 mcg by mouth daily   lisinopril 20 mg TABS 20 mg, hydrochlorothiazide 25 mg TABS 25 mg   Yes Yes   Sig: Take by mouth daily   metoprolol tartrate (LOPRESSOR) 50 mg tablet   Yes Yes   Sig: Take 1 tablet by mouth every 12 (twelve) hours   ondansetron (ZOFRAN-ODT) 4 mg disintegrating tablet   Yes Yes   Sig: Take by mouth   pantoprazole (PROTONIX) 40 mg tablet   Yes Yes   Sig: Take 40 mg by mouth   sucralfate (CARAFATE) 1 g tablet   No Yes   Sig: Take 1 tablet (1 g total) by mouth 4 (four) times a day (before meals and at bedtime) for 14 days      Facility-Administered Medications: None       Past Medical History:   Diagnosis Date    Bowel obstruction (HCC)     Gastritis     Hypertension        Past Surgical History:   Procedure Laterality Date    ABDOMINAL SURGERY      BOWEL RESECTION         History reviewed  No pertinent family history    I have reviewed and agree with the history as documented  Social History   Substance Use Topics    Smoking status: Current Every Day Smoker     Packs/day: 0 50     Types: Cigarettes    Smokeless tobacco: Never Used    Alcohol use No        Review of Systems   Constitutional: Positive for fatigue  Negative for fever and unexpected weight change  HENT: Negative for congestion, sore throat and voice change  Eyes: Negative for pain and visual disturbance  Respiratory: Negative for cough, chest tightness and shortness of breath  Cardiovascular: Negative for chest pain  Gastrointestinal: Positive for abdominal pain, nausea and vomiting  Negative for diarrhea  Endocrine: Negative for polyphagia and polyuria  Genitourinary: Negative for difficulty urinating, dysuria, flank pain, frequency and urgency  Musculoskeletal: Negative for back pain, gait problem, neck pain and neck stiffness  Skin: Negative for color change and rash  Neurological: Negative for dizziness, syncope, speech difficulty, light-headedness, numbness and headaches  Psychiatric/Behavioral: Negative for confusion and decreased concentration  Physical Exam  ED Triage Vitals [05/16/18 1225]   Temperature Pulse Respirations Blood Pressure SpO2   98 °F (36 7 °C) 71 18 156/84 98 %      Temp Source Heart Rate Source Patient Position - Orthostatic VS BP Location FiO2 (%)   Temporal Monitor -- Right arm --      Pain Score       3           Orthostatic Vital Signs  Vitals:    05/16/18 1225 05/16/18 1447   BP: 156/84 133/71   Pulse: 71 59       Physical Exam   Constitutional: She is oriented to person, place, and time  She appears well-developed and well-nourished  HENT:   Head: Normocephalic and atraumatic  Eyes: Conjunctivae and EOM are normal  Pupils are equal, round, and reactive to light  No scleral icterus  Neck: Normal range of motion  Neck supple  No tracheal deviation present  Cardiovascular: Normal rate and regular rhythm      Pulmonary/Chest: Effort normal and breath sounds normal  No respiratory distress  Abdominal:   Soft, nondistended, moderate tenderness in the epigastrium without rebound or guarding  Bowel sounds are normal  No CVA tenderness bilaterally  Musculoskeletal: Normal range of motion  She exhibits no tenderness or deformity  Lymphadenopathy:     She has no cervical adenopathy  Neurological: She is alert and oriented to person, place, and time  No gross focal sensory or motor deficits   Skin: Skin is warm and dry  No rash noted  She is not diaphoretic  Psychiatric: She has a normal mood and affect  Vitals reviewed  ED Medications  Medications   sodium chloride 0 9 % bolus 1,000 mL (0 mL Intravenous Stopped 5/16/18 1442)   ondansetron (ZOFRAN) injection 4 mg (4 mg Intravenous Given 5/16/18 1258)   ketorolac (TORADOL) injection 15 mg (15 mg Intravenous Given 5/16/18 1258)   sucralfate (CARAFATE) oral suspension 1,000 mg (1,000 mg Oral Given 5/16/18 1259)   famotidine (PEPCID) tablet 20 mg (20 mg Oral Given 5/16/18 1259)   iohexol (OMNIPAQUE) 350 MG/ML injection (MULTI-DOSE) 100 mL (100 mL Intravenous Given 5/16/18 1436)       Diagnostic Studies  Results Reviewed     Procedure Component Value Units Date/Time    Lactic acid, plasma [[id number]]  (Normal) Collected:  05/16/18 1447    Lab Status:  Final result Specimen:  Blood from Arm, Left Updated:  05/16/18 1521     LACTIC ACID 1 3 mmol/L     Narrative:         Result may be elevated if tourniquet was used during collection      Hepatic function panel [[id number]]  (Abnormal) Collected:  05/16/18 1258    Lab Status:  Final result Specimen:  Blood from Arm, Right Updated:  05/16/18 1347     Total Bilirubin 0 95 mg/dL      Bilirubin, Direct 0 12 mg/dL      Alkaline Phosphatase 79 U/L      AST 33 U/L      ALT 27 U/L      Total Protein 8 4 (H) g/dL      Albumin 4 2 g/dL     Lipase [[id number]]  (Normal) Collected:  05/16/18 1258    Lab Status:  Final result Specimen:  Blood from Arm, Right Updated: 05/16/18 1347     Lipase 196 u/L     TSH [[id number]]  (Normal) Collected:  05/16/18 1258    Lab Status:  Final result Specimen:  Blood from Arm, Right Updated:  05/16/18 1347     TSH 3RD GENERATON 2 167 uIU/mL     Narrative:         Patients undergoing fluorescein dye angiography may retain small amounts of fluorescein in the body for 48-72 hours post procedure  Samples containing fluorescein can produce falsely depressed TSH values  If the patient had this procedure,a specimen should be resubmitted post fluorescein clearance  The recommended reference ranges for TSH during pregnancy are as follows:  First trimester 0 1 to 2 5 uIU/mL  Second trimester  0 2 to 3 0 uIU/mL  Third trimester 0 3 to 3 0 uIU/m      Basic metabolic panel [[id number]]  (Abnormal) Collected:  05/16/18 1258    Lab Status:  Final result Specimen:  Blood from Arm, Right Updated:  05/16/18 1338     Sodium 125 (L) mmol/L      Potassium 3 4 (L) mmol/L      Chloride 85 (L) mmol/L      CO2 28 mmol/L      Anion Gap 12 mmol/L      BUN 8 mg/dL      Creatinine 0 73 mg/dL      Glucose 173 (H) mg/dL      Calcium 9 5 mg/dL      eGFR 96 ml/min/1 73sq m     Narrative:         National Kidney Disease Education Program recommendations are as follows:  GFR calculation is accurate only with a steady state creatinine  Chronic Kidney disease less than 60 ml/min/1 73 sq  meters  Kidney failure less than 15 ml/min/1 73 sq  meters  Troponin I [[id number]]  (Normal) Collected:  05/16/18 1258    Lab Status:  Final result Specimen:  Blood from Arm, Right Updated:  05/16/18 1337     Troponin I <0 02 ng/mL     Narrative:         [employer] Chemistry analyzer 99% cutoff is > 0 04 ng/mL in network labs    o cTnI 99% cutoff is useful only when applied to patients in the clinical setting of myocardial ischemia  o cTnI 99% cutoff should be interpreted in the context of clinical history, ECG findings and possibly cardiac imaging to establish correct diagnosis    o cTnI 99% cutoff may be suggestive but clearly not indicative of a coronary event without the clinical setting of myocardial ischemia  POCT urinalysis dipstick [[id number]]  (Normal) Resulted:  05/16/18 1318    Lab Status:  Final result Specimen:  Urine Updated:  05/16/18 1319     Color, UA yellow    ED Urine Macroscopic [[id number]] Collected:  05/16/18 1320    Lab Status:  Final result Specimen:  Urine Updated:  05/16/18 1318     Color, UA Yellow     Clarity, UA Clear     pH, UA 6 5     Leukocytes, UA Negative     Nitrite, UA Negative     Protein, UA Negative mg/dl      Glucose, UA Negative mg/dl      Ketones, UA Negative mg/dl      Urobilinogen, UA 0 2 E U /dl      Bilirubin, UA Negative     Blood, UA Negative     Specific Gravity, UA 1 010    Narrative:       CLINITEK RESULT    CBC and differential [[id number]]  (Abnormal) Collected:  05/16/18 1258    Lab Status:  Final result Specimen:  Blood from Arm, Right Updated:  05/16/18 1315     WBC 11 35 (H) Thousand/uL      RBC 4 54 Million/uL      Hemoglobin 15 7 (H) g/dL      Hematocrit 42 9 %      MCV 95 fL      MCH 34 6 (H) pg      MCHC 36 6 g/dL      RDW 12 5 %      MPV 10 1 fL      Platelets 494 Thousands/uL      nRBC 0 /100 WBCs      Neutrophils Relative 72 %      Immat GRANS % 0 %      Lymphocytes Relative 20 %      Monocytes Relative 7 %      Eosinophils Relative 1 %      Basophils Relative 0 %      Neutrophils Absolute 8 14 (H) Thousands/µL      Immature Grans Absolute 0 05 Thousand/uL      Lymphocytes Absolute 2 28 Thousands/µL      Monocytes Absolute 0 82 Thousand/µL      Eosinophils Absolute 0 08 Thousand/µL      Basophils Absolute 0 03 Thousands/µL                  CT abdomen pelvis with contrast   Final Result by Miladis Sexton MD (05/16 4831)      Thickening of the wall the gastric antrum and duodenum with surrounding peritoneal edema  Findings are suspicious for gastritis and possibly peptic ulcer disease        Small amount of free pelvic fluid in the anterior left hemipelvis, probably reactive  Workstation performed: [id number]                    Procedures  ECG 12 Lead Documentation  Date/Time: 5/16/2018 2:34 PM  Performed by: Michael Mckoy  Authorized by: Michael Mckoy     ECG reviewed by me, the ED Provider: yes    Patient location:  ED  Rhythm:     Rhythm: sinus rhythm and A-V block      Rhythm comment:  Sinus with first-degree AV block  Ectopy:     Ectopy: none    QRS:     QRS intervals:  Normal  ST segments:     ST segments:  Non-specific  T waves:     T waves: normal    Other findings:     Other findings: poor R wave progression             Phone Contacts  ED Phone Contact    ED Course                               MDM  Number of Diagnoses or Management Options  Acute epigastric pain: new and requires workup  Acute hyponatremia: new and requires workup  Diagnosis management comments: 33-year-old female presents for evaluation of epigastric pain with some radiation to her back  Patient states that she woke with the pain yesterday  Initially it was somewhat intermittent but has become more constant and more severe over the past 24 to 48 hours  Pain is described as "gnawing" and severe  Patient does have a long history of gastritis  She is unsure if this pain is similar to previous episodes of gastritis or not  She does relate vomiting that started around 03:00 this morning  She has had three or four episodes of nonbloody, nonbilious vomiting  She reports nonbloody diarrhea but states that "I always have diarrhea "  She denies any new medications aside from Carafate which she started about three weeks ago  33-year-old female with epigastric pain, nausea, and vomiting  Of note, the patient's sodium today is 125, which is a substantial drop from 144 only three weeks ago  Patient has had nausea and vomiting, however only three or four episodes of vomiting would not typically drop her sodium this much    Her other laboratory studies are relatively unremarkable  She is taking an SSRI but states that she has been taking this medication for a long time  Of note, patient states that she and her son will often go through as much as a case of 36 bottles of water per day  She does, however, note that this is also relatively typical for her  CT scan demonstrates gastritis/PUD without perforation  Will admit to [place] [address] [facility] for further evaluation and treatment  Amount and/or Complexity of Data Reviewed  Clinical lab tests: reviewed and ordered  Tests in the radiology section of CPT®: reviewed and ordered  Review and summarize past medical records: yes  Independent visualization of images, tracings, or specimens: yes      CritCare Time    Disposition  Final diagnoses:   Acute epigastric pain   Acute hyponatremia     Time reflects when diagnosis was documented in both MDM as applicable and the Disposition within this note     Time User Action Codes Description Comment    5/16/2018 12:39 PM Duke HUNTER Add [R10 13] Acute epigastric pain     5/16/2018  3:13 PM Duke HUNTER Add [E87 1] Acute hyponatremia     5/16/2018  4:10 PM Ismael August Add [K29 00] Other acute gastritis without hemorrhage     5/16/2018  4:10 PM Ismael August Modify [K29 00] Other acute gastritis without hemorrhage     5/16/2018  4:10 PM Ismael August Add [K27 9] Peptic ulcer disease       ED Disposition     ED Disposition Condition Comment    Admit  Case was discussed with [facility] and the patient's admission status was agreed to be Admission Status: inpatient status to the service of Dr Edna Cardoso  Follow-up Information    None       Patient's Medications   Discharge Prescriptions    No medications on file     No discharge procedures on file      ED Provider  Electronically Signed by           Adali Nolasco MD  05/16/18 [address] Alfred MD  05/16/18 7756

## 2018-05-16 NOTE — H&P
H&P Exam - Jayshree Lamb 50 y o  female MRN: [id number]    Unit/Bed#: ED 23 Encounter: [id number]      Assessment/Plan     1-severe epigastric pain likely CV gastritis versus peptic ulcer disease  CT reveals thickening of the gastric wall antrum and duodenum with surrounding peritoneal edema  Will start the patient on IV PPI and keep her on clears  Will consult GI for consideration of an EGD given the abnormal findings  Patient denies any hematemesis  2-intractable nausea and vomiting secondary to 1-will start the patient on IV fluids and treated symptomatically  Add Zofran    3-hyponatremia likely secondary to dehydration secondary to 2-plus use of hydrochlorothiazide- could also have some element of SIADH given that the patient is on Celexa-if sodium trends up with IV fluids will not pursue any more investigations however if sodium remains low will get a urine osms  , serum osms and urine sodium  3-essential hypertension-blood pressure currently stable  Continue her home regimen of amlodipine, lisinopril  Hold hydrochlorothiazide given that she is dehydrated with hyponatremia     4-depression/anxiety-on Celexa    5-ongoing tobacco use-patient was counseled with regards to tobacco cessation  She is pre contemplative  Agreeable to a nicotine patch    9-yrpmkjpmjxsg-xcnfrxcb Crestor    1-necebldzevgpeb-wvsjgzah Synthroid    In view of intractable abdominal pain and concern for peptic ulcer disease needing EGD will admit her within inpatient status and expect a more than 2 midnight stay  History of Present Illness     HPI:  Jayshree Lamb is a 50 y o  female the past medical history of hypertension, tobacco use, dyslipidemia, hypothyroidism, anxiety who presents with nausea and vomiting over the past 3 days associated with epigastric pain that started 2 days ago  She describes the pain as "gnawing"-aggravated with food and relieved with fasting    She states that it feels as if her stomach is twisted when she notices the pain which lasts for couple of hours and then subsides  Two days ago she noticed nausea and yesterday she started having vomiting 3-4 episodes  And 2 episodes today  Id when asked about diarrhea she says that I always have 5 loose stools a day  She denies any fever, sick contacts or loss of weight  In the ED she was noted to have a sodium of 125 and a potassium of 3 4  A CT of the abdomen revealed thickening of the wall of the stomach more severe in the gastric antrum with edema surrounding the gastric antrum and duodenum  There was some free pelvic fluid in the anterior left hemipelvis  The patient has had a hysterectomy  Her blood sugars are 173 however the patient denies any prior history of diabetes  The patient will be admitted for IV fluids to correct her hypernatremia as well as for evaluation and will need an EGD to rule out peptic ulcer disease given the abnormal findings on CT scan  Historical Information   Past Medical History:   Diagnosis Date    Bowel obstruction ([facility] [address] )     Gastritis     Hypertension      Patient Active Problem List   Diagnosis    Acute gastritis without bleeding    Hyponatremia    Dehydration    Essential hypertension    Anxiety     Past Surgical History:   Procedure Laterality Date    ABDOMINAL SURGERY      BOWEL RESECTION         Social History   History   Alcohol Use No     History   Drug Use No     History   Smoking Status    Current Every Day Smoker    Packs/day: 0 50    Types: Cigarettes   Smokeless Tobacco    Never Used       Family History:  Positive for myocardial infarction-mother and aunt had a heart attack in their 45s  Meds/Allergies     No current facility-administered medications for this encounter       Current Outpatient Prescriptions:     amLODIPine (NORVASC) 5 mg tablet, Take 5 mg by mouth daily, Disp: , Rfl:     citalopram (CeleXA) 20 mg tablet, Take 20 mg by mouth daily, Disp: , Rfl:     levothyroxine 50 mcg tablet, Take 50 mcg by mouth daily, Disp: , Rfl:     lisinopril 20 mg TABS 20 mg, hydrochlorothiazide 25 mg TABS 25 mg, Take by mouth daily, Disp: , Rfl:     metoprolol tartrate (LOPRESSOR) 50 mg tablet, Take 1 tablet by mouth every 12 (twelve) hours, Disp: , Rfl:     ondansetron (ZOFRAN-ODT) 4 mg disintegrating tablet, Take by mouth, Disp: , Rfl:     pantoprazole (PROTONIX) 40 mg tablet, Take 40 mg by mouth, Disp: , Rfl:     sucralfate (CARAFATE) 1 g tablet, Take 1 tablet (1 g total) by mouth 4 (four) times a day (before meals and at bedtime) for 14 days, Disp: 56 tablet, Rfl: 0    Allergies   Allergen Reactions    Latex Rash       Review of Systems  A detailed 12 point review of systems was conducted and is negative apart from those mentioned in the HPI  Objective   Vitals: Blood pressure 133/71, pulse 59, temperature 98 °F (36 7 °C), temperature source Temporal, resp  rate 18, height 5' 3" (1 6 m), weight 72 6 kg (160 lb), SpO2 99 %  Physical Exam     Gen-NAD  HEENT: PERRLA, EOMI, sclera anicteric, dry mucous membranes, tongue mucosa dry without lesions  Neck: supple, no JVD, lymphadenopathy, thyromegaly  Heart: Regular rate and rhythm, S1S2 present  No murmur, rub or gallop  Lungs; Clear to auscultation bilaterally  No wheezing, crackles or rhonchi  No accessory muscle use or respiratory distress  Abdomen:  Epigastric tenderness, non-distended, NABS  No guarding or rebound  No peritoneal sound or mass  Extremities: no clubbing, cyanosis, or edema  2+ pedal pulses bilaterally  Full range of motion  Neurologic:  Cranial nerves II-XII intact  Strength and sensation globally intact  Speech fluent and goal directed  Awake, alert and oriented x 3  Skin: warm and dry  No petechiae, purpura or rash        Lab Results:       Results from last 7 days  Lab Units 05/16/18  1258   WBC Thousand/uL 11 35*   HEMOGLOBIN g/dL 15 7*   HEMATOCRIT % 42 9   PLATELETS Thousands/uL 278   NEUTROS PCT % 72 LYMPHS PCT % 20   MONOS PCT % 7   EOS PCT % 1       Results from last 7 days  Lab Units 05/16/18  1258   SODIUM mmol/L 125*   POTASSIUM mmol/L 3 4*   CHLORIDE mmol/L 85*   CO2 mmol/L 28   BUN mg/dL 8   CREATININE mg/dL 0 73   CALCIUM mg/dL 9 5   TOTAL PROTEIN g/dL 8 4*   BILIRUBIN TOTAL mg/dL 0 95   ALK PHOS U/L 79   ALT U/L 27   AST U/L 33   GLUCOSE RANDOM mg/dL 173*         Imaging:  IMPRESSION:     Thickening of the wall the gastric antrum and duodenum with surrounding peritoneal edema  Findings are suspicious for gastritis and possibly peptic ulcer disease    Small amount of free pelvic fluid in the anterior left hemipelvis, probably reactive  EKG-nonspecific T-wave changes  No evidence of any ischemia or arrhythmia  Code Status:  Full code    Counseling / Coordination of Care  Total floor / unit time spent today 25 minutes  Greater than 50% of total time was spent with the patient and / or family counseling and / or coordination of care  Portions of the record may have been created with voice recognition software  Occasional wrong word or "sound a like" substitutions may have occurred due to the inherent limitations of voice recognition software   Read the chart carefully and recognize, using context, where substitutions have occurred

## 2018-05-17 ENCOUNTER — ANESTHESIA EVENT (INPATIENT)
Dept: GASTROENTEROLOGY | Facility: HOSPITAL | Age: 53
DRG: 392 | End: 2018-05-17
Payer: MEDICARE

## 2018-05-17 PROBLEM — E03.9 HYPOTHYROIDISM: Status: ACTIVE | Noted: 2018-05-17

## 2018-05-17 PROBLEM — E87.6 HYPOKALEMIA: Status: ACTIVE | Noted: 2018-05-17

## 2018-05-17 PROBLEM — I10 HTN (HYPERTENSION), BENIGN: Status: ACTIVE | Noted: 2018-05-17

## 2018-05-17 PROBLEM — K29.70 GASTRITIS: Status: ACTIVE | Noted: 2018-05-16

## 2018-05-17 PROBLEM — R10.13 ACUTE EPIGASTRIC PAIN: Status: ACTIVE | Noted: 2018-05-16

## 2018-05-17 PROBLEM — R11.2 NAUSEA & VOMITING: Status: ACTIVE | Noted: 2018-05-17

## 2018-05-17 PROBLEM — R10.10 PAIN OF UPPER ABDOMEN: Status: ACTIVE | Noted: 2018-05-16

## 2018-05-17 LAB
ANION GAP SERPL CALCULATED.3IONS-SCNC: 5 MMOL/L (ref 4–13)
ANION GAP SERPL CALCULATED.3IONS-SCNC: 8 MMOL/L (ref 4–13)
ANION GAP SERPL CALCULATED.3IONS-SCNC: 9 MMOL/L (ref 4–13)
BUN SERPL-MCNC: 5 MG/DL (ref 5–25)
BUN SERPL-MCNC: 6 MG/DL (ref 5–25)
BUN SERPL-MCNC: 8 MG/DL (ref 5–25)
CALCIUM SERPL-MCNC: 7.8 MG/DL (ref 8.3–10.1)
CALCIUM SERPL-MCNC: 8.4 MG/DL (ref 8.3–10.1)
CALCIUM SERPL-MCNC: 8.6 MG/DL (ref 8.3–10.1)
CHLORIDE SERPL-SCNC: 103 MMOL/L (ref 100–108)
CHLORIDE SERPL-SCNC: 105 MMOL/L (ref 100–108)
CHLORIDE SERPL-SCNC: 93 MMOL/L (ref 100–108)
CO2 SERPL-SCNC: 26 MMOL/L (ref 21–32)
CO2 SERPL-SCNC: 26 MMOL/L (ref 21–32)
CO2 SERPL-SCNC: 31 MMOL/L (ref 21–32)
CREAT SERPL-MCNC: 0.66 MG/DL (ref 0.6–1.3)
CREAT SERPL-MCNC: 0.67 MG/DL (ref 0.6–1.3)
CREAT SERPL-MCNC: 0.68 MG/DL (ref 0.6–1.3)
EST. AVERAGE GLUCOSE BLD GHB EST-MCNC: 111 MG/DL
GFR SERPL CREATININE-BSD FRML MDRD: 102 ML/MIN/1.73SQ M
GFR SERPL CREATININE-BSD FRML MDRD: 103 ML/MIN/1.73SQ M
GFR SERPL CREATININE-BSD FRML MDRD: 103 ML/MIN/1.73SQ M
GLUCOSE SERPL-MCNC: 119 MG/DL (ref 65–140)
GLUCOSE SERPL-MCNC: 93 MG/DL (ref 65–140)
GLUCOSE SERPL-MCNC: 97 MG/DL (ref 65–140)
HBA1C MFR BLD: 5.5 % (ref 4.2–6.3)
OSMOLALITY UR/SERPL-RTO: 267 MMOL/KG (ref 282–298)
OSMOLALITY UR: 51 MMOL/KG
POTASSIUM SERPL-SCNC: 2.8 MMOL/L (ref 3.5–5.3)
POTASSIUM SERPL-SCNC: 3.6 MMOL/L (ref 3.5–5.3)
POTASSIUM SERPL-SCNC: 3.9 MMOL/L (ref 3.5–5.3)
SODIUM 24H UR-SCNC: 10 MOL/L
SODIUM SERPL-SCNC: 128 MMOL/L (ref 136–145)
SODIUM SERPL-SCNC: 139 MMOL/L (ref 136–145)
SODIUM SERPL-SCNC: 139 MMOL/L (ref 136–145)
URATE SERPL-MCNC: 4.2 MG/DL (ref 2–6.8)

## 2018-05-17 PROCEDURE — 84550 ASSAY OF BLOOD/URIC ACID: CPT | Performed by: PHYSICIAN ASSISTANT

## 2018-05-17 PROCEDURE — 84300 ASSAY OF URINE SODIUM: CPT | Performed by: PHYSICIAN ASSISTANT

## 2018-05-17 PROCEDURE — 99222 1ST HOSP IP/OBS MODERATE 55: CPT | Performed by: INTERNAL MEDICINE

## 2018-05-17 PROCEDURE — 80048 BASIC METABOLIC PNL TOTAL CA: CPT | Performed by: PHYSICIAN ASSISTANT

## 2018-05-17 PROCEDURE — 99232 SBSQ HOSP IP/OBS MODERATE 35: CPT | Performed by: HOSPITALIST

## 2018-05-17 PROCEDURE — 83935 ASSAY OF URINE OSMOLALITY: CPT | Performed by: PHYSICIAN ASSISTANT

## 2018-05-17 PROCEDURE — C9113 INJ PANTOPRAZOLE SODIUM, VIA: HCPCS | Performed by: HOSPITALIST

## 2018-05-17 PROCEDURE — 83930 ASSAY OF BLOOD OSMOLALITY: CPT | Performed by: PHYSICIAN ASSISTANT

## 2018-05-17 PROCEDURE — 80048 BASIC METABOLIC PNL TOTAL CA: CPT | Performed by: HOSPITALIST

## 2018-05-17 PROCEDURE — 83036 HEMOGLOBIN GLYCOSYLATED A1C: CPT | Performed by: HOSPITALIST

## 2018-05-17 RX ORDER — HEPARIN SODIUM 5000 [USP'U]/ML
5000 INJECTION, SOLUTION INTRAVENOUS; SUBCUTANEOUS EVERY 8 HOURS SCHEDULED
Status: DISCONTINUED | OUTPATIENT
Start: 2018-05-17 | End: 2018-05-18 | Stop reason: HOSPADM

## 2018-05-17 RX ORDER — POTASSIUM CHLORIDE 20 MEQ/1
40 TABLET, EXTENDED RELEASE ORAL 2 TIMES DAILY
Status: COMPLETED | OUTPATIENT
Start: 2018-05-17 | End: 2018-05-18

## 2018-05-17 RX ORDER — SODIUM CHLORIDE AND POTASSIUM CHLORIDE .9; .15 G/100ML; G/100ML
75 SOLUTION INTRAVENOUS CONTINUOUS
Status: DISCONTINUED | OUTPATIENT
Start: 2018-05-17 | End: 2018-05-17

## 2018-05-17 RX ORDER — NICOTINE 21 MG/24HR
14 PATCH, TRANSDERMAL 24 HOURS TRANSDERMAL DAILY
Status: DISCONTINUED | OUTPATIENT
Start: 2018-05-17 | End: 2018-05-17

## 2018-05-17 RX ADMIN — SODIUM CHLORIDE AND POTASSIUM CHLORIDE 75 ML/HR: .9; .15 SOLUTION INTRAVENOUS at 09:42

## 2018-05-17 RX ADMIN — SODIUM CHLORIDE 125 ML/HR: 0.9 INJECTION, SOLUTION INTRAVENOUS at 00:45

## 2018-05-17 RX ADMIN — HEPARIN SODIUM 5000 UNITS: 5000 INJECTION, SOLUTION INTRAVENOUS; SUBCUTANEOUS at 14:11

## 2018-05-17 RX ADMIN — PANTOPRAZOLE SODIUM 40 MG: 40 INJECTION, POWDER, FOR SOLUTION INTRAVENOUS at 09:11

## 2018-05-17 RX ADMIN — SUCRALFATE 1 G: 1 TABLET ORAL at 21:47

## 2018-05-17 RX ADMIN — LEVOTHYROXINE SODIUM 50 MCG: 50 TABLET ORAL at 05:01

## 2018-05-17 RX ADMIN — SUCRALFATE 1 G: 1 TABLET ORAL at 11:35

## 2018-05-17 RX ADMIN — AMLODIPINE BESYLATE 5 MG: 5 TABLET ORAL at 09:11

## 2018-05-17 RX ADMIN — METOPROLOL TARTRATE 50 MG: 50 TABLET ORAL at 04:50

## 2018-05-17 RX ADMIN — METOPROLOL TARTRATE 50 MG: 50 TABLET ORAL at 17:13

## 2018-05-17 RX ADMIN — SUCRALFATE 1 G: 1 TABLET ORAL at 16:10

## 2018-05-17 RX ADMIN — HEPARIN SODIUM 5000 UNITS: 5000 INJECTION, SOLUTION INTRAVENOUS; SUBCUTANEOUS at 21:46

## 2018-05-17 RX ADMIN — ACETAMINOPHEN 650 MG: 325 TABLET ORAL at 04:49

## 2018-05-17 RX ADMIN — CITALOPRAM HYDROBROMIDE 20 MG: 20 TABLET ORAL at 09:11

## 2018-05-17 RX ADMIN — POTASSIUM CHLORIDE 40 MEQ: 1500 TABLET, EXTENDED RELEASE ORAL at 17:13

## 2018-05-17 RX ADMIN — SUCRALFATE 1 G: 1 TABLET ORAL at 05:01

## 2018-05-17 RX ADMIN — PANTOPRAZOLE SODIUM 40 MG: 40 INJECTION, POWDER, FOR SOLUTION INTRAVENOUS at 21:46

## 2018-05-17 RX ADMIN — HEPARIN SODIUM 5000 UNITS: 5000 INJECTION, SOLUTION INTRAVENOUS; SUBCUTANEOUS at 09:42

## 2018-05-17 RX ADMIN — POTASSIUM CHLORIDE 40 MEQ: 1500 TABLET, EXTENDED RELEASE ORAL at 09:10

## 2018-05-17 NOTE — SOCIAL WORK
CM met with patient at bedside to address discharge needs; introduced and explained role in patients care and pointed out extension number on white board to call if needed  Patient reports she lives in a house with her 29 y o  son, Marguerite Altamirano; [address]  Patients bedroom is located on the 2nd floor; patient denies difficulty with steps  Patient is independent with ADLs and functional mobility  Support system is son, if needed  Food shopping & meal prep is done by patient  Patient does not use DMEs  Patient is able to ambulate without assistance from a seated or laying position  No hx of VNA reported  Patient is on SSD  PCP identified as Saima Samuels, who is a CRNP  No POA identified, but son could be her healthcare representative, spokesperson for the family and designated caregiver if needed  Patient uses [facility] on the [facility] side of [facility] for Rx needs; patient made aware of [address] to use at discharge if needed  Patient does drive; reports son available at discharge to transport home  Help/support reported when patient is home

## 2018-05-17 NOTE — ASSESSMENT & PLAN NOTE
· Has been on Celexa for 6 months without dosage change  · Continue at this time however depending on change in sodium levels may need to hold this

## 2018-05-17 NOTE — ASSESSMENT & PLAN NOTE
· Sodium on admission 125, this morning improved to 128  · Was noted to be on hydrochlorothiazide, lisinopril as an outpatient was also noted to be on Celexa for 6 months without dose changed  · One month ago was noted to have sodium of 144  · 3 1 L of normal saline solution since admission  · Check urine osmolality, serum osmolality, urine sodium, a m  cortisol, uric acid  · TSH within normal limits  · Did receive 1 dose of NSAID in the form of Toradol since admission  Hold any further NSAIDs  Hold lisinopril  Continue Celexa however if lack of improvement in sodium may need to discontinue this as well  · Repeat BMP later this afternoon  · Reduce IVF to 75 ml/hr    · DDX: hypovolemic from vomiting and HCTZ use vs  SIADH from celexa, excessive water intake at home

## 2018-05-17 NOTE — ASSESSMENT & PLAN NOTE
· Occurred prior to admission for approximately 3 times  No nausea or vomiting at this time  On clear liquid diet    · zofran PRN

## 2018-05-17 NOTE — PROGRESS NOTES
[facility]  Progress Note - Nonnie Ceredo 1965, 50 y o  female MRN: [id number]  Unit/Bed#: [address] [place] [address] 216-01 Encounter: [id number]  Primary Care Provider: YONG Ramirez   Date and time admitted to hospital: 5/16/2018 12:25 PM    * Hyponatremia   Assessment & Plan    · Sodium on admission 125, this morning improved to 128  · Was noted to be on hydrochlorothiazide, lisinopril as an outpatient was also noted to be on Celexa for 6 months without dose changed  · One month ago was noted to have sodium of 144  · 3 1 L of normal saline solution since admission  · Check urine osmolality, serum osmolality, urine sodium, a m  cortisol, uric acid  · TSH within normal limits  · Did receive 1 dose of NSAID in the form of Toradol since admission  Hold any further NSAIDs  Hold lisinopril  Continue Celexa however if lack of improvement in sodium may need to discontinue this as well  · Repeat BMP later this afternoon  · Reduce IVF to 75 ml/hr  · DDX: hypovolemic from vomiting and HCTZ use vs  SIADH from celexa, excessive water intake at home        Pain of upper abdomen   Assessment & Plan    · Thickening of the wall the gastric antrum and duodenum with surrounding peritoneal edema  Findings are suspicious for gastritis and possibly peptic ulcer disease  · IV PPI BID  · Continue carafate  · Await GI consult and consideration for EGD given above findings  Nausea & vomiting   Assessment & Plan    · Occurred prior to admission for approximately 3 times  No nausea or vomiting at this time  On clear liquid diet  · zofran PRN        Hypokalemia   Assessment & Plan    · 2 8 this AM  40 PO x 2 doses  · Add K to IVF  Essential hypertension   Assessment & Plan    · Hold ACE  · Continue norvasc    · /82        Hypothyroidism   Assessment & Plan    · Continue synthroid, TSH WNL        Anxiety   Assessment & Plan    · Has been on Celexa for 6 months without dosage change  · Continue at this time however depending on change in sodium levels may need to hold this          VTE Pharmacologic Prophylaxis:   Pharmacologic: Heparin  Mechanical VTE Prophylaxis in Place: Yes    Patient Centered Rounds: I have performed bedside rounds with nursing staff today  RN     Discussions with Specialists or Other Care Team Provider: nursing    Education and Discussions with Family / Patient: patient    Time Spent for Care: 30 minutes  More than 50% of total time spent on counseling and coordination of care as described above  Current Length of Stay: 1 day(s)    Current Patient Status: Inpatient   Certification Statement: The patient will continue to require additional inpatient hospital stay due to IV fluids, correction of hyponatremia, need for GI eval    Discharge Plan:  Not stable for discharge at this time  Awaiting further evaluation and workup    Code Status: Level 1 - Full Code      Subjective:   No nausea or vomiting at this time  Still with intermittent abdominal pain however improved from admission  No chest pain or shortness of breath  Has been on Celexa for approximately 6 months without alteration dosages  No change in medications  At home does drink large amounts of water    Objective:     Vitals:   Temp (24hrs), Av °F (36 7 °C), Min:97 1 °F (36 2 °C), Max:98 8 °F (37 1 °C)    HR:  [51-71] 51  Resp:  [18] 18  BP: (126-160)/(70-90) 132/82  SpO2:  [98 %-100 %] 99 %  Body mass index is 28 34 kg/m²  Input and Output Summary (last 24 hours): Intake/Output Summary (Last 24 hours) at 18 0919  Last data filed at 18 0850   Gross per 24 hour   Intake             3440 ml   Output                0 ml   Net             3440 ml       Physical Exam:     Physical Exam   Constitutional: She is oriented to person, place, and time  No distress  HENT:   Head: Normocephalic and atraumatic  Eyes: Conjunctivae are normal    Neck: No JVD present     Cardiovascular: Normal rate, regular rhythm, normal heart sounds and intact distal pulses  No murmur heard  Pulmonary/Chest: Effort normal and breath sounds normal  No respiratory distress  She has no wheezes  She has no rales  Abdominal: Soft  Bowel sounds are normal  She exhibits no distension  There is no tenderness  There is no rebound and no guarding  Negative murphys sign   Musculoskeletal: She exhibits edema ( mild periorbital)  Neurological: She is alert and oriented to person, place, and time  Skin: Skin is warm and dry  No rash noted  She is not diaphoretic  No erythema  Psychiatric: She has a normal mood and affect  Nursing note and vitals reviewed  Additional Data:     Labs:      Results from last 7 days  Lab Units 05/16/18  1258   WBC Thousand/uL 11 35*   HEMOGLOBIN g/dL 15 7*   HEMATOCRIT % 42 9   PLATELETS Thousands/uL 278   NEUTROS PCT % 72   LYMPHS PCT % 20   MONOS PCT % 7   EOS PCT % 1       Results from last 7 days  Lab Units 05/17/18  0547 05/16/18  1258   SODIUM mmol/L 128* 125*   POTASSIUM mmol/L 2 8* 3 4*   CHLORIDE mmol/L 93* 85*   CO2 mmol/L 26 28   BUN mg/dL 5 8   CREATININE mg/dL 0 66 0 73   CALCIUM mg/dL 7 8* 9 5   TOTAL PROTEIN g/dL  --  8 4*   BILIRUBIN TOTAL mg/dL  --  0 95   ALK PHOS U/L  --  79   ALT U/L  --  27   AST U/L  --  33   GLUCOSE RANDOM mg/dL 97 173*                     * I Have Reviewed All Lab Data Listed Above  * Additional Pertinent Lab Tests Reviewed:  All Labs Within Last 24 Hours Reviewed    Imaging:    Imaging Reports Reviewed Today Include: CT  Imaging Personally Reviewed by Myself Includes:  none    Recent Cultures (last 7 days):           Last 24 Hours Medication List:     Current Facility-Administered Medications:  acetaminophen 650 mg Oral Q6H PRN Omayra Engle MD   amLODIPine 5 mg Oral Daily Omayra Engle MD   citalopram 20 mg Oral Daily Omayra Engle MD   heparin (porcine) 5,000 Units Subcutaneous Q8H [address] Ros Andrade PA-C   hydrALAZINE 10 mg Intravenous Q6H PRN Marquita Hernandez MD   levothyroxine 50 mcg Oral Early Morning Marquita Hernandez MD   metoprolol tartrate 50 mg Oral Q12H Marquita Hernandez MD   nicotine 1 patch Transdermal Daily Marquita Hernandez MD   ondansetron 4 mg Intravenous Q4H PRN Marquita Hernandez MD   pantoprazole 40 mg Intravenous Q12H 93 Romel Gates MD   potassium chloride 40 mEq Oral BID Ros Andrade PA-C   sodium chloride 0 9 % with KCl 20 mEq/L 75 mL/hr Intravenous Continuous Ros Andrade PA-C   sucralfate 1 g Oral 4x Daily (AC & HS) Marquita Hernandez MD        Today, Patient Was Seen By: Alta Del Rio PA-C    ** Please Note: Dictation voice to text software may have been used in the creation of this document   **

## 2018-05-17 NOTE — CONSULTS
Consultation - [facility]  Becky Esposito 50 y o  female MRN: [id number]  Unit/Bed#: [place] [address] 2 -01 Encounter: [id number]        ASSESSMENT/PLAN:   Epigastric pain  GERD    She has a hx of gastritis & has been on PPI & Carafate without relief  CT showing wall thickening of gastric antrum & duodenum with surrounding edema  Despite having an EGD for the same I would recommend repeating since it has been 1 yr & her sxs persist  Unfortunately she has already had PO today & therefore will schedule for tomorrow    -Continue PPI & Carafate  -Diet as tolerated  -NPO after midnight  -EGD tomorrow      Consults    Reason for Consult / Principal Problem: Hyponatremia    HPI: Becky Esposito is a 50y o  year old female with a PMH of bowel obstruction, gastritis, HTN who presents with epigastric pain for the past few days  She describes a burning & twisting sensation in her stomach  She states she was diagnosed w/ gastritis a year ago & has been on a PPI & then Carafate recently added after being seen in the ER in April  She states she tries to follow a "reflux" diet but states she continues to have burning  She did have N/V prior to admission but this has resolved  She states she typically has loose BM's  On admission CT showed thickening of gastric antrum & duodenum & surrounding edema  She was seen at [facility] ~ 1yr ago for the same & had a CT showing the same  She had an EGD at that time which only showed gastritis, HP & celiac (-)  Review of Systems: as per HPI  Review of Systems   All other systems reviewed and are negative        Historical Information   Past Medical History:   Diagnosis Date    Bowel obstruction (HCC)     Gastritis     Hypertension      Past Surgical History:   Procedure Laterality Date    ABDOMINAL SURGERY      BOWEL RESECTION       Social History   History   Alcohol Use No     History   Drug Use No     History   Smoking Status    Current Every Day Smoker    Packs/day: 0 50    Types: Cigarettes   Smokeless Tobacco    Never Used     History reviewed  No pertinent family history  Meds/Allergies     Prescriptions Prior to Admission   Medication    amLODIPine (NORVASC) 5 mg tablet    citalopram (CeleXA) 20 mg tablet    levothyroxine 50 mcg tablet    lisinopril 20 mg TABS 20 mg, hydrochlorothiazide 25 mg TABS 25 mg    metoprolol tartrate (LOPRESSOR) 50 mg tablet    ondansetron (ZOFRAN-ODT) 4 mg disintegrating tablet    pantoprazole (PROTONIX) 40 mg tablet    sucralfate (CARAFATE) 1 g tablet     Current Facility-Administered Medications   Medication Dose Route Frequency    acetaminophen (TYLENOL) tablet 650 mg  650 mg Oral Q6H PRN    amLODIPine (NORVASC) tablet 5 mg  5 mg Oral Daily    citalopram (CeleXA) tablet 20 mg  20 mg Oral Daily    heparin (porcine) subcutaneous injection 5,000 Units  5,000 Units Subcutaneous Q8H [address]    hydrALAZINE (APRESOLINE) injection 10 mg  10 mg Intravenous Q6H PRN    levothyroxine tablet 50 mcg  50 mcg Oral Early Morning    metoprolol tartrate (LOPRESSOR) tablet 50 mg  50 mg Oral Q12H    nicotine (NICODERM CQ) 21 mg/24 hr TD 24 hr patch 1 patch  1 patch Transdermal Daily    ondansetron (ZOFRAN) injection 4 mg  4 mg Intravenous Q4H PRN    pantoprazole (PROTONIX) injection 40 mg  40 mg Intravenous Q12H NACHO    potassium chloride (K-DUR,KLOR-CON) CR tablet 40 mEq  40 mEq Oral BID    sodium chloride 0 9 % with KCl 20 mEq/L infusion (premix)  75 mL/hr Intravenous Continuous    sucralfate (CARAFATE) tablet 1 g  1 g Oral 4x Daily (AC & HS)       Allergies   Allergen Reactions    Latex Rash       Objective     Blood pressure 132/82, pulse (!) 51, temperature 97 9 °F (36 6 °C), temperature source Temporal, resp  rate 18, height 5' 3" (1 6 m), weight 72 6 kg (160 lb), SpO2 99 %        Intake/Output Summary (Last 24 hours) at 05/17/18 1219  Last data filed at 05/17/18 0952   Gross per 24 hour   Intake             3440 ml   Output 200 ml   Net             3240 ml       PHYSICAL EXAM     Physical Exam   Constitutional: She is oriented to person, place, and time  She appears well-developed and well-nourished  No distress  HENT:   Head: Normocephalic and atraumatic  Eyes: Conjunctivae and EOM are normal    Neck: Normal range of motion  Cardiovascular: Normal rate and regular rhythm  Pulmonary/Chest: Effort normal and breath sounds normal    Abdominal: Soft  Bowel sounds are normal  She exhibits no distension  There is no tenderness  Musculoskeletal: Normal range of motion  Neurological: She is alert and oriented to person, place, and time  Skin: Skin is warm and dry  Psychiatric: She has a normal mood and affect  Her behavior is normal        Lab Results:   CBC: Lab Results   Component Value Date    WBC 11 35 (H) 05/16/2018    HGB 15 7 (H) 05/16/2018    HCT 42 9 05/16/2018    MCV 95 05/16/2018     05/16/2018    MCH 34 6 (H) 05/16/2018    MCHC 36 6 05/16/2018    RDW 12 5 05/16/2018    MPV 10 1 05/16/2018    NRBC 0 05/16/2018   ,   CMP: Lab Results   Component Value Date     (L) 05/17/2018    K 2 8 (L) 05/17/2018    CL 93 (L) 05/17/2018    CO2 26 05/17/2018    ANIONGAP 9 05/17/2018    BUN 5 05/17/2018    CREATININE 0 66 05/17/2018    GLUCOSE 97 05/17/2018    CALCIUM 7 8 (L) 05/17/2018    AST 33 05/16/2018    ALT 27 05/16/2018    ALKPHOS 79 05/16/2018    PROT 8 4 (H) 05/16/2018    BILITOT 0 95 05/16/2018    EGFR 103 05/17/2018   ,   Lipase:   Lab Results   Component Value Date    LIPASE 196 05/16/2018   ,  PT/INR: No results found for: PT, INR,   Troponin:   Lab Results   Component Value Date    TROPONINI <0 02 05/16/2018   ,   Imaging Studies: I have personally reviewed pertinent reports  CT abd & pelvis: Thickening of the wall the gastric antrum and duodenum with surrounding peritoneal edema    Findings are suspicious for gastritis and possibly peptic ulcer disease      Small amount of free pelvic fluid in the anterior left hemipelvis, probably reactive

## 2018-05-17 NOTE — ASSESSMENT & PLAN NOTE
· Thickening of the wall the gastric antrum and duodenum with surrounding peritoneal edema  Findings are suspicious for gastritis and possibly peptic ulcer disease  · IV PPI BID  · Continue carafate  · Await GI consult and consideration for EGD given above findings

## 2018-05-17 NOTE — CASE MANAGEMENT
Initial Clinical Review    Admission: Date/Time/Statement: 5/16/18 @ 1604     Orders Placed This Encounter   Procedures    Inpatient Admission (expected length of stay for this patient is greater than two midnights)     Standing Status:   Standing     Number of Occurrences:   1     Order Specific Question:   Admitting Physician     Answer:   Horace Maki [[id number]]     Order Specific Question:   Level of Care     Answer:   Med Surg [[id number]]     Order Specific Question:   Estimated length of stay     Answer:   More than 2 Midnights     Order Specific Question:   Certification     Answer:   I certify that inpatient services are medically necessary for this patient for a duration of greater than two midnights  See H&P and MD Progress Notes for additional information about the patient's course of treatment  ED: Date/Time/Mode of Arrival:   ED Arrival Information     Expected Arrival Acuity Means of Arrival Escorted By Service Admission Type    - 5/16/2018 12:13 Urgent Walk-In Self General Medicine Urgent    Arrival Complaint    ABDOMINAL PAIN,WEAKNESS          Chief Complaint:   Chief Complaint   Patient presents with    Epigastric Pain     Epigastric pain yesterday with vomiting, hx gastritis  Pt reports feeling very fatigued today  History of Illness: 53-year-old female presents for evaluation of epigastric pain with some radiation to her back  Patient states that she woke with the pain yesterday  Initially it was somewhat intermittent but has become more constant and more severe over the past 24 to 48 hours  Pain is described as "gnawing" and severe  Patient does have a long history of gastritis  She is unsure if this pain is similar to previous episodes of gastritis or not  She does relate vomiting that started around 03:00 this morning  She has had three or four episodes of nonbloody, nonbilious vomiting    She reports nonbloody diarrhea but states that "I always have diarrhea "  She denies any new medications aside from Carafate which she started about three weeks ago  Abdominal:   Soft, nondistended, moderate tenderness in the epigastrium without rebound or guarding  Bowel sounds are normal  No CVA tenderness bilaterally  ED Vital Signs:   ED Triage Vitals   Temperature Pulse Respirations Blood Pressure SpO2   05/16/18 1225 05/16/18 1225 05/16/18 1225 05/16/18 1225 05/16/18 1225   98 °F (36 7 °C) 71 18 156/84 98 %      Temp Source Heart Rate Source Patient Position - Orthostatic VS BP Location FiO2 (%)   05/16/18 1225 05/16/18 1225 05/16/18 1653 05/16/18 1225 --   Temporal Monitor Sitting Right arm       Pain Score       05/16/18 1225       3        Wt Readings from Last 1 Encounters:   05/16/18 72 6 kg (160 lb)       Abnormal Labs/Diagnostic Test Results:  Na 125,   K 3 4,   Cl 85,   Glu 173,   T pro 8 4,   Wbc's 11 35,   ANC 8 14  CT A&P: Thickening of the wall the gastric antrum and duodenum with surrounding peritoneal edema   Findings are suspicious for gastritis and possibly peptic ulcer disease  Small amount of free pelvic fluid in the anterior left hemipelvis, probably reactive    EKG: Sinus rhythm with 1st degree A-V block    ED Treatment:   Medication Administration from 05/16/2018 1213 to 05/16/2018 1635       Date/Time Order Dose Route Action Action by Comments     05/16/2018 1442 sodium chloride 0 9 % bolus 1,000 mL 0 mL Intravenous Issac Hall RN      05/16/2018 1259 sodium chloride 0 9 % bolus 1,000 mL 1,000 mL Intravenous New Bag Jovon Henley, MARK ANTHONY      05/16/2018 1258 ondansetron (ZOFRAN) injection 4 mg 4 mg Intravenous Given Jovon Henley RN      05/16/2018 1258 ketorolac (TORADOL) injection 15 mg 15 mg Intravenous Given Jovon Henley RN      05/16/2018 1259 sucralfate (CARAFATE) oral suspension 1,000 mg 1,000 mg Oral Given Jovon Henley, RN      05/16/2018 1259 famotidine (PEPCID) tablet 20 mg 20 mg Oral Given Jovon Henley, RN      05/16/2018 1433 iohexol (OMNIPAQUE) 350 MG/ML injection (MULTI-DOSE) 100 mL 100 mL Intravenous Given Guero Jarquin           Past Medical/Surgical History: Active Ambulatory Problems     Diagnosis Date Noted    No Active Ambulatory Problems     Resolved Ambulatory Problems     Diagnosis Date Noted    No Resolved Ambulatory Problems     Past Medical History:   Diagnosis Date    Bowel obstruction (HCC)     Gastritis     Hypertension        Admitting Diagnosis: Acute hyponatremia [E87 1]  Peptic ulcer disease [K27 9]  Weakness [R53 1]  Abdominal pain [R10 9]  Acute epigastric pain [R10 13]  Other acute gastritis without hemorrhage [K29 00]    Age/Sex: 50 y o  female    Assessment/Plan:   1-severe epigastric pain likely CV gastritis versus peptic ulcer disease  CT reveals thickening of the gastric wall antrum and duodenum with surrounding peritoneal edema  Will start the patient on IV PPI and keep her on clears  Will consult GI for consideration of an EGD given the abnormal findings  Patient denies any hematemesis     2-intractable nausea and vomiting secondary to 1-will start the patient on IV fluids and treated symptomatically  Add Zofran     3-hyponatremia likely secondary to dehydration secondary to 2-plus use of hydrochlorothiazide- could also have some element of SIADH given that the patient is on Celexa-if sodium trends up with IV fluids will not pursue any more investigations however if sodium remains low will get a urine osms  , serum osms and urine sodium      3-essential hypertension-blood pressure currently stable  Continue her home regimen of amlodipine, lisinopril  Hold hydrochlorothiazide given that she is dehydrated with hyponatremia      4-depression/anxiety-on Celexa     5-ongoing tobacco use-patient was counseled with regards to tobacco cessation  She is pre contemplative    Agreeable to a nicotine patch     5-qloyaymzjtzv-uiculygd Crestor     5-jznniphfithfeu-yspxgchu Synthroid     In view of intractable abdominal pain and concern for peptic ulcer disease needing EGD will admit her within inpatient status and expect a more than 2 midnight stay  Admission Orders:  IP  Consult GI  OOB as tolerated  Clear liquids  IV NSS @ 125 / hr  Scheduled Meds:   Current Facility-Administered Medications:          amLODIPine 5 mg Oral Daily Aftab Maharaj MD    citalopram 20 mg Oral Daily Aftab Maharaj MD    heparin (porcine) 5,000 Units Subcutaneous Q8H [facility] Ros Andrade PA-C            levothyroxine 50 mcg Oral Early Morning Ismael August MD    metoprolol tartrate 50 mg Oral Q12H Ismael August MD    nicotine 1 patch Transdermal Daily Aftab Maharaj MD            pantoprazole 40 mg Intravenous Q12H 93 Romel Gates MD    potassium chloride 40 mEq Oral BID Ros Andrade PA-C            sucralfate 1 g Oral 4x Daily (AC & HS) Aftab Maharaj MD      Continuous Infusions:   sodium chloride 0 9 % with KCl 20 mEq/L  Started 5/17 75 mL/hr Last Rate: 75 mL/hr (05/17/18 0942)     PRN Meds:   acetaminophen    hydrALAZINE    ondansetron    Thank you,  [address] [facility] in the [facility] by [address] for 2017  Network Utilization Review Department  Phone: [phone]; Fax [phone]  ATTENTION: The Network Utilization Review Department is now centralized for our 7 Facilities  Make a note that we have a new phone and fax numbers for our Department  Please call with any questions or concerns to [phone] and carefully follow the prompts so that you are directed to the right person  All voicemails are confidential  Fax any determinations, approvals, denials, and requests for initial or continue stay review clinical to [phone]  Due to HIGH CALL volume, it would be easier if you could please send faxed requests to expedite your requests and in part, help us provide discharge notifications faster

## 2018-05-18 ENCOUNTER — ANESTHESIA (INPATIENT)
Dept: GASTROENTEROLOGY | Facility: HOSPITAL | Age: 53
DRG: 392 | End: 2018-05-18
Payer: MEDICARE

## 2018-05-18 VITALS
BODY MASS INDEX: 28.35 KG/M2 | SYSTOLIC BLOOD PRESSURE: 138 MMHG | OXYGEN SATURATION: 100 % | WEIGHT: 160 LBS | HEIGHT: 63 IN | HEART RATE: 68 BPM | TEMPERATURE: 97.9 F | DIASTOLIC BLOOD PRESSURE: 85 MMHG | RESPIRATION RATE: 19 BRPM

## 2018-05-18 PROBLEM — R10.13 ACUTE EPIGASTRIC PAIN: Status: RESOLVED | Noted: 2018-05-16 | Resolved: 2018-05-18

## 2018-05-18 PROBLEM — R11.2 NAUSEA & VOMITING: Status: RESOLVED | Noted: 2018-05-17 | Resolved: 2018-05-18

## 2018-05-18 PROBLEM — R10.10 PAIN OF UPPER ABDOMEN: Status: RESOLVED | Noted: 2018-05-16 | Resolved: 2018-05-18

## 2018-05-18 PROBLEM — E87.6 HYPOKALEMIA: Status: RESOLVED | Noted: 2018-05-17 | Resolved: 2018-05-18

## 2018-05-18 PROBLEM — E87.1 HYPONATREMIA: Status: RESOLVED | Noted: 2018-05-16 | Resolved: 2018-05-18

## 2018-05-18 PROBLEM — F41.9 ANXIETY: Status: RESOLVED | Noted: 2018-05-16 | Resolved: 2018-05-18

## 2018-05-18 PROBLEM — E86.0 DEHYDRATION: Status: RESOLVED | Noted: 2018-05-16 | Resolved: 2018-05-18

## 2018-05-18 LAB
ANION GAP SERPL CALCULATED.3IONS-SCNC: 6 MMOL/L (ref 4–13)
BUN SERPL-MCNC: 10 MG/DL (ref 5–25)
CALCIUM SERPL-MCNC: 8.5 MG/DL (ref 8.3–10.1)
CHLORIDE SERPL-SCNC: 106 MMOL/L (ref 100–108)
CO2 SERPL-SCNC: 27 MMOL/L (ref 21–32)
CORTIS AM PEAK SERPL-MCNC: 13.9 UG/DL (ref 4.2–22.4)
CREAT SERPL-MCNC: 0.66 MG/DL (ref 0.6–1.3)
ERYTHROCYTE [DISTWIDTH] IN BLOOD BY AUTOMATED COUNT: 12.3 % (ref 11.6–15.1)
GFR SERPL CREATININE-BSD FRML MDRD: 103 ML/MIN/1.73SQ M
GLUCOSE SERPL-MCNC: 95 MG/DL (ref 65–140)
HCT VFR BLD AUTO: 36.6 % (ref 34.8–46.1)
HGB BLD-MCNC: 12.5 G/DL (ref 11.5–15.4)
MCH RBC QN AUTO: 32.6 PG (ref 26.8–34.3)
MCHC RBC AUTO-ENTMCNC: 34.2 G/DL (ref 31.4–37.4)
MCV RBC AUTO: 96 FL (ref 82–98)
PLATELET # BLD AUTO: 246 THOUSANDS/UL (ref 149–390)
PMV BLD AUTO: 10.2 FL (ref 8.9–12.7)
POTASSIUM SERPL-SCNC: 3.7 MMOL/L (ref 3.5–5.3)
RBC # BLD AUTO: 3.83 MILLION/UL (ref 3.81–5.12)
SODIUM SERPL-SCNC: 139 MMOL/L (ref 136–145)
WBC # BLD AUTO: 9.55 THOUSAND/UL (ref 4.31–10.16)

## 2018-05-18 PROCEDURE — 43239 EGD BIOPSY SINGLE/MULTIPLE: CPT | Performed by: INTERNAL MEDICINE

## 2018-05-18 PROCEDURE — 99232 SBSQ HOSP IP/OBS MODERATE 35: CPT | Performed by: INTERNAL MEDICINE

## 2018-05-18 PROCEDURE — 82533 TOTAL CORTISOL: CPT | Performed by: PHYSICIAN ASSISTANT

## 2018-05-18 PROCEDURE — 88342 IMHCHEM/IMCYTCHM 1ST ANTB: CPT | Performed by: PATHOLOGY

## 2018-05-18 PROCEDURE — 0DB68ZX EXCISION OF STOMACH, VIA NATURAL OR ARTIFICIAL OPENING ENDOSCOPIC, DIAGNOSTIC: ICD-10-PCS | Performed by: INTERNAL MEDICINE

## 2018-05-18 PROCEDURE — 99239 HOSP IP/OBS DSCHRG MGMT >30: CPT | Performed by: HOSPITALIST

## 2018-05-18 PROCEDURE — 88305 TISSUE EXAM BY PATHOLOGIST: CPT | Performed by: PATHOLOGY

## 2018-05-18 PROCEDURE — 85027 COMPLETE CBC AUTOMATED: CPT | Performed by: PHYSICIAN ASSISTANT

## 2018-05-18 PROCEDURE — 80048 BASIC METABOLIC PNL TOTAL CA: CPT | Performed by: PHYSICIAN ASSISTANT

## 2018-05-18 PROCEDURE — C9113 INJ PANTOPRAZOLE SODIUM, VIA: HCPCS | Performed by: HOSPITALIST

## 2018-05-18 RX ORDER — PROPOFOL 10 MG/ML
INJECTION, EMULSION INTRAVENOUS AS NEEDED
Status: DISCONTINUED | OUTPATIENT
Start: 2018-05-18 | End: 2018-05-18 | Stop reason: SURG

## 2018-05-18 RX ORDER — SODIUM CHLORIDE 9 MG/ML
INJECTION, SOLUTION INTRAVENOUS CONTINUOUS PRN
Status: DISCONTINUED | OUTPATIENT
Start: 2018-05-18 | End: 2018-05-18 | Stop reason: SURG

## 2018-05-18 RX ADMIN — PANTOPRAZOLE SODIUM 40 MG: 40 INJECTION, POWDER, FOR SOLUTION INTRAVENOUS at 10:22

## 2018-05-18 RX ADMIN — SUCRALFATE 1 G: 1 TABLET ORAL at 05:00

## 2018-05-18 RX ADMIN — METOPROLOL TARTRATE 50 MG: 50 TABLET ORAL at 05:00

## 2018-05-18 RX ADMIN — SODIUM CHLORIDE: 0.9 INJECTION, SOLUTION INTRAVENOUS at 08:52

## 2018-05-18 RX ADMIN — AMLODIPINE BESYLATE 5 MG: 5 TABLET ORAL at 10:23

## 2018-05-18 RX ADMIN — NICOTINE 1 PATCH: 21 PATCH, EXTENDED RELEASE TRANSDERMAL at 10:20

## 2018-05-18 RX ADMIN — HEPARIN SODIUM 5000 UNITS: 5000 INJECTION, SOLUTION INTRAVENOUS; SUBCUTANEOUS at 05:00

## 2018-05-18 RX ADMIN — PROPOFOL 200 MG: 10 INJECTION, EMULSION INTRAVENOUS at 09:15

## 2018-05-18 RX ADMIN — SUCRALFATE 1 G: 1 TABLET ORAL at 11:05

## 2018-05-18 RX ADMIN — LEVOTHYROXINE SODIUM 50 MCG: 50 TABLET ORAL at 05:00

## 2018-05-18 RX ADMIN — POTASSIUM CHLORIDE 40 MEQ: 1500 TABLET, EXTENDED RELEASE ORAL at 10:23

## 2018-05-18 RX ADMIN — CITALOPRAM HYDROBROMIDE 20 MG: 20 TABLET ORAL at 10:23

## 2018-05-18 NOTE — PROGRESS NOTES
Progress Note - Kathia Mejia 50 y o  female MRN: [id number]    Unit/Bed#: [place] [address] -01 Encounter: [id number]    Principal Problem:    Hyponatremia  Active Problems:    Pain of upper abdomen    Dehydration    Essential hypertension    Anxiety    Nausea & vomiting    Hypokalemia    Hypothyroidism    Acute epigastric pain    Gastritis      Assessment/Plan:  Hyponatremia   Assessment & Plan     · Sodium on admission 125, this morning improved to 139-likely pre renal since this improved with IV fluids          Pain of upper abdomen-EGD revealed acute gastritis  Biopsies taken  Assessment & Plan     · Thickening of the wall the gastric antrum and duodenum with surrounding peritoneal edema on CT scan  · However EGD revealed only gastritis with no evidence of peptic ulcer disease  Biopsies taken  ·    Continue carafate and Protonix  ·           Nausea & vomiting-resolved   Assessment & Plan     Occurred prior to admission for approximately 3 times  No nausea or vomiting at this time  Tolerating regular diet        Hypokalemia-repleted   Assessment & Plan             Essential hypertension   Assessment & Plan     · ACE-restarted  · Continue norvasc  · /82          Hypothyroidism   Assessment & Plan     · Continue synthroid, TSH WNL          Anxiety   Assessment & Plan     · Has been on Celexa for 6 months without dosage change  · Continue at this time however depending on change in sodium levels may need to hold this     Stable for discharge home      Subjective:  Patient doing well  EGD reveals gastritis  No evidence of peptic ulcer disease  Biopsies taken  This will be reviewed with the patient on follow-up  Sodium has normalized to 139  Physical Exam:   Vitals: Blood pressure 138/85, pulse 68, temperature 97 9 °F (36 6 °C), temperature source Temporal, resp  rate 19, height 5' 3" (1 6 m), weight 72 6 kg (160 lb), SpO2 100 %  ,Body mass index is 28 34 kg/m²          Gen:  Pleasant, non-tachypnic, non-dyspnic  Conversant  Heart: regular rate and rhythm, S1S2 present, no murmur, rub or gallop  Lungs: clear to ausculatation bilaterally  No wheezing, crackless, or rhonchi  No accessory muscle use or respiratory distress  Abd: soft, non-tender, non-distended  NABS, no guarding, rebound or peritoneal signs  Extremities: no clubbing, cyanosis or edema  2+pedal pulses bilaterally  Full range of motion  Neuro: awake, alert and oriented  Cranial nerves 2-12 intact  Strength and sensation grossly intact  Skin: warm and dry: no petechiae, purpura and rash      LABS:     Results from last 7 days  Lab Units 05/18/18  0441 05/16/18  1258   WBC Thousand/uL 9 55 11 35*   HEMOGLOBIN g/dL 12 5 15 7*   HEMATOCRIT % 36 6 42 9   PLATELETS Thousands/uL 246 278       Results from last 7 days  Lab Units 05/18/18  0441 05/17/18  2314 05/17/18  1332   SODIUM mmol/L 139 139 139   POTASSIUM mmol/L 3 7 3 9 3 6   CHLORIDE mmol/L 106 105 103   CO2 mmol/L 27 26 31   BUN mg/dL 10 8 6   CREATININE mg/dL 0 66 0 68 0 67   GLUCOSE RANDOM mg/dL 95 93 119   CALCIUM mg/dL 8 5 8 4 8 6       Intake/Output Summary (Last 24 hours) at 05/18/18 1053  Last data filed at 05/18/18 0926   Gross per 24 hour   Intake              740 ml   Output                0 ml   Net              740 ml           Current Facility-Administered Medications:  acetaminophen 650 mg Oral Q6H PRN Ismael August MD   amLODIPine 5 mg Oral Daily Ismael August MD   citalopram 20 mg Oral Daily Ángel Claudio MD   heparin (porcine) 5,000 Units Subcutaneous Q8H [facility] Ros Andrade PA-C   hydrALAZINE 10 mg Intravenous Q6H PRN Ángel Claudio MD   levothyroxine 50 mcg Oral Early Morning Ángel Claudio MD   metoprolol tartrate 50 mg Oral Q12H Ángel Claudio MD   nicotine 1 patch Transdermal Daily Ángel Claudio MD   ondansetron 4 mg Intravenous Q4H PRN Ángel Claudio MD   pantoprazole 40 mg Intravenous Q12H Isabelle Palmer MD sucralfate 1 g Oral 4x Daily (AC & HS) Marquita Hernandez MD

## 2018-05-18 NOTE — ANESTHESIA PREPROCEDURE EVALUATION
Review of Systems/Medical History  Patient summary reviewed  Chart reviewed      Cardiovascular  Hypertension ,    Pulmonary  Smoker cigarette smoker  , Tobacco cessation counseling given Cumulative Pack Years: 16,        GI/Hepatic    GERD ,        Negative  ROS        Endo/Other  History of thyroid disease , hypothyroidism,      GYN    Hysterectomy,        Hematology  Negative hematology ROS      Musculoskeletal  Negative musculoskeletal ROS        Neurology  Negative neurology ROS      Psychology   Anxiety,              Physical Exam    Airway    Mallampati score: II  TM Distance: >3 FB  Neck ROM: full     Dental       Cardiovascular  Rhythm: regular, Rate: normal, Cardiovascular exam normal    Pulmonary  Pulmonary exam normal Breath sounds clear to auscultation,     Other Findings        Anesthesia Plan  ASA Score- 2     Anesthesia Type- IV sedation with anesthesia and general with ASA Monitors  Additional Monitors:   Airway Plan:         Plan Factors-    Induction- intravenous  Postoperative Plan-     Informed Consent- Anesthetic plan and risks discussed with patient

## 2018-05-18 NOTE — SOCIAL WORK
Patient is being discharged and she was changed to a Bundle  Met with her prior to discharge and presented the Bundle letter

## 2018-05-18 NOTE — PROGRESS NOTES
Progress Note- Antonieta Bui 50 y o  female MRN: [id number]    Unit/Bed#: ENDO POOL Encounter: [id number]      Assessment and Plan:    51-year-old female with GERD and dyspepsia  CT scan shows thickening in the antrum and duodenum  This could be due to peptic ulcer disease, gastritis, reflux  She is NPO for EGD today  We will plan stomach biopsies to assess for H pylori status as well  In regards to colon cancer screening  I would recommend outpt follow up for colonoscopy evaluation  ______________________________________________________________________    Subjective:     Patient was NPO for EGD today      Medication Administration - last 24 hours from 05/17/2018 0851 to 05/18/2018 0851       Date/Time Order Dose Route Action Action by     05/17/2018 0911 amLODIPine (NORVASC) tablet 5 mg 5 mg Oral Given Edgardo Saha RN     05/17/2018 0911 citalopram (CeleXA) tablet 20 mg 20 mg Oral Given Edgardo Saha RN     05/18/2018 0500 levothyroxine tablet 50 mcg 50 mcg Oral Given Sunshine Esquivel RN     05/18/2018 0500 metoprolol tartrate (LOPRESSOR) tablet 50 mg 50 mg Oral Given Sunshine Esquivel RN     05/17/2018 1713 metoprolol tartrate (LOPRESSOR) tablet 50 mg 50 mg Oral Given Edgardo Saha RN     05/18/2018 0500 sucralfate (CARAFATE) tablet 1 g 1 g Oral Given Sunshine Esquivel RN     05/17/2018 2147 sucralfate (CARAFATE) tablet 1 g 1 g Oral Given Sunshine Esquivel RN     05/17/2018 1610 sucralfate (CARAFATE) tablet 1 g 1 g Oral Given Edgardo Saha RN     05/17/2018 1135 sucralfate (CARAFATE) tablet 1 g 1 g Oral Given Edgardo Saha RN     05/17/2018 0911 nicotine (NICODERM CQ) 21 mg/24 hr TD 24 hr patch 1 patch 1 patch Transdermal Not Given Edgardo Saha RN     05/17/2018 2146 pantoprazole (PROTONIX) injection 40 mg 40 mg Intravenous Given Sunshine Esquivel RN     05/17/2018 0911 pantoprazole (PROTONIX) injection 40 mg 40 mg Intravenous Given Edgardo Saha RN     05/17/2018 0186 potassium chloride (K-DUR,KLOR-CON) CR tablet 40 mEq 40 mEq Oral Given Ana Paula Smart, RN     05/17/2018 0910 potassium chloride (K-DUR,KLOR-CON) CR tablet 40 mEq 40 mEq Oral Given Ana Paula Smart, RN     05/17/2018 5268 sodium chloride 0 9 % with KCl 20 mEq/L infusion (premix) 75 mL/hr Intravenous [address], RN     05/18/2018 0500 heparin (porcine) subcutaneous injection 5,000 Units 5,000 Units Subcutaneous Given Sinan Chavez, RN     05/17/2018 2146 heparin (porcine) subcutaneous injection 5,000 Units 5,000 Units Subcutaneous Given Sinan Chavez RN     05/17/2018 1411 heparin (porcine) subcutaneous injection 5,000 Units 5,000 Units Subcutaneous Given Ana Paula Smart RN     05/17/2018 8230 heparin (porcine) subcutaneous injection 5,000 Units 5,000 Units Subcutaneous Given Ana Paula Smart, MARK ANTHONY          Objective:     Vitals: Blood pressure 143/77, pulse 62, temperature (!) 97 4 °F (36 3 °C), temperature source Tympanic, resp  rate 18, height 5' 3" (1 6 m), weight 72 6 kg (160 lb), SpO2 97 %  ,Body mass index is 28 34 kg/m²        Intake/Output Summary (Last 24 hours) at 05/18/18 0851  Last data filed at 05/17/18 1742   Gross per 24 hour   Intake              240 ml   Output              200 ml   Net               40 ml       Physical Exam:   General Appearance: Awake and alert, in no acute distress  Abdomen: Soft, non-tender, non-distended; bowel sounds normal; no masses or no organomegaly    Invasive Devices     Peripheral Intravenous Line            Peripheral IV 05/17/18 Right Arm less than 1 day                Lab Results:  Admission on 05/16/2018   Component Date Value    Sodium 05/16/2018 125*    Potassium 05/16/2018 3 4*    Chloride 05/16/2018 85*    CO2 05/16/2018 28     Anion Gap 05/16/2018 12     BUN 05/16/2018 8     Creatinine 05/16/2018 0 73     Glucose 05/16/2018 173*    Calcium 05/16/2018 9 5     eGFR 05/16/2018 96     Total Bilirubin 05/16/2018 0 95     Bilirubin, Direct 05/16/2018 0 12     Alkaline Phosphatase 05/16/2018 79     AST 05/16/2018 33     ALT 05/16/2018 27     Total Protein 05/16/2018 8 4*    Albumin 05/16/2018 4 2     Lipase 05/16/2018 196     Troponin I 05/16/2018 <0 02     WBC 05/16/2018 11 35*    RBC 05/16/2018 4 54     Hemoglobin 05/16/2018 15 7*    Hematocrit 05/16/2018 42 9     MCV 05/16/2018 95     MCH 05/16/2018 34 6*    MCHC 05/16/2018 36 6     RDW 05/16/2018 12 5     MPV 05/16/2018 10 1     Platelets 23/92/8635 278     nRBC 05/16/2018 0     Neutrophils Relative 05/16/2018 72     Immat GRANS % 05/16/2018 0     Lymphocytes Relative 05/16/2018 20     Monocytes Relative 05/16/2018 7     Eosinophils Relative 05/16/2018 1     Basophils Relative 05/16/2018 0     Neutrophils Absolute 05/16/2018 8 14*    Immature Grans Absolute 05/16/2018 0 05     Lymphocytes Absolute 05/16/2018 2 28     Monocytes Absolute 05/16/2018 0 82     Eosinophils Absolute 05/16/2018 0 08     Basophils Absolute 05/16/2018 0 03     Color, UA 05/16/2018 yellow     TSH 3RD GENERATON 05/16/2018 2 167     Color, UA 05/16/2018 Yellow     Clarity, UA 05/16/2018 Clear     pH, UA 05/16/2018 6 5     Leukocytes, UA 05/16/2018 Negative     Nitrite, UA 05/16/2018 Negative     Protein, UA 05/16/2018 Negative     Glucose, UA 05/16/2018 Negative     Ketones, UA 05/16/2018 Negative     Urobilinogen, UA 05/16/2018 0 2     Bilirubin, UA 05/16/2018 Negative     Blood, UA 05/16/2018 Negative     Specific Gravity, UA 05/16/2018 1 010     Ventricular Rate 05/16/2018 71     Atrial Rate 05/16/2018 71     NE Interval 05/16/2018 214     QRSD Interval 05/16/2018 76     QT Interval 05/16/2018 416     QTC Interval 05/16/2018 452     P Axis 05/16/2018 67     QRS Axis 05/16/2018 54     T Wave Axis 05/16/2018 5     LACTIC ACID 05/16/2018 1 3     Sodium 05/17/2018 128*    Potassium 05/17/2018 2 8*    Chloride 05/17/2018 93*    CO2 05/17/2018 26     Anion Gap 05/17/2018 9     BUN 05/17/2018 5     Creatinine 05/17/2018 0 66     Glucose 05/17/2018 97     Calcium 05/17/2018 7 8*    eGFR 05/17/2018 103     Hemoglobin A1C 05/17/2018 5 5     EAG 05/17/2018 111     Osmolality Serum 05/17/2018 267*    Osmolality, Ur 05/17/2018 51*    Sodium, Ur 05/17/2018 10     Uric Acid 05/17/2018 4 2     Sodium 05/17/2018 139     Potassium 05/17/2018 3 6     Chloride 05/17/2018 103     CO2 05/17/2018 31     Anion Gap 05/17/2018 5     BUN 05/17/2018 6     Creatinine 05/17/2018 0 67     Glucose 05/17/2018 119     Calcium 05/17/2018 8 6     eGFR 05/17/2018 103     Sodium 05/17/2018 139     Potassium 05/17/2018 3 9     Chloride 05/17/2018 105     CO2 05/17/2018 26     Anion Gap 05/17/2018 8     BUN 05/17/2018 8     Creatinine 05/17/2018 0 68     Glucose 05/17/2018 93     Calcium 05/17/2018 8 4     eGFR 05/17/2018 102     Sodium 05/18/2018 139     Potassium 05/18/2018 3 7     Chloride 05/18/2018 106     CO2 05/18/2018 27     Anion Gap 05/18/2018 6     BUN 05/18/2018 10     Creatinine 05/18/2018 0 66     Glucose 05/18/2018 95     Calcium 05/18/2018 8 5     eGFR 05/18/2018 103     WBC 05/18/2018 9 55     RBC 05/18/2018 3 83     Hemoglobin 05/18/2018 12 5     Hematocrit 05/18/2018 36 6     MCV 05/18/2018 96     MCH 05/18/2018 32 6     MCHC 05/18/2018 34 2     RDW 05/18/2018 12 3     Platelets 47/08/6647 246     MPV 05/18/2018 10 2        Imaging Studies: I have personally reviewed pertinent imaging studies

## 2018-05-18 NOTE — PLAN OF CARE
DISCHARGE PLANNING     Discharge to home or other facility with appropriate resources Progressing        DISCHARGE PLANNING - CARE MANAGEMENT     Discharge to post-acute care or home with appropriate resources Progressing        GASTROINTESTINAL - ADULT     Minimal or absence of nausea and/or vomiting Progressing     Maintains or returns to baseline bowel function Progressing     Maintains adequate nutritional intake Progressing        Knowledge Deficit     Patient/family/caregiver demonstrates understanding of disease process, treatment plan, medications, and discharge instructions Progressing        PAIN - ADULT     Verbalizes/displays adequate comfort level or baseline comfort level Progressing        Potential for Falls     Patient will remain free of falls Progressing        SAFETY ADULT     Maintain or return to baseline ADL function Progressing     Maintain or return mobility status to optimal level Progressing

## 2018-05-18 NOTE — DISCHARGE SUMMARY
Discharge Summary - Medical Jacqueline Crook 50 y o  female MRN: [id number]    [facility] [place] [address] 2 /South 2 M* Encounter: [id number]    BRIEF OVERVIEW      Admission Date: 5/16/2018       Discharge Date:  05/18/2018      Admitting Diagnosis:  Intractable nausea and vomiting  Acute gastritis  Hyponatremia    Primary Discharge Diagnosis  Principal Problem (Resolved): Hyponatremia-resolved  Acute severe gastritis  Active Problems:    Essential hypertension    Hypothyroidism    Gastritis  Resolved Problems:    Pain of upper abdomen    Dehydration    Anxiety    Nausea & vomiting    Hypokalemia    Acute epigastric pain      Service:  [facility]    Consulting Providers   GI-Dr West    Procedures Performed   EGD-FINDINGS:     #1  Esophagus-normal esophagus     #2  Stomach-mild gastritis in the stomach, biopsied with cold forceps     #3  Duodenum-normal 1st and 2nd part of the duodenum         IMPRESSIONS:       Mild gastritis  Biopsied        Assessment plan on date of discharge    Hyponatremia   Assessment & Plan     · Sodium on admission 125, this morning improved to 139-likely pre renal since this improved with IV fluids          Pain of upper abdomen-EGD revealed acute gastritis  Biopsies taken  Assessment & Plan     · Thickening of the wall the gastric antrum and duodenum with surrounding peritoneal edema on CT scan  · However EGD revealed only gastritis with no evidence of peptic ulcer disease  Biopsies taken  ·    Continue carafate and Protonix  ·            Nausea & vomiting-resolved   Assessment & Plan     Occurred prior to admission for approximately 3 times   No nausea or vomiting at this time     Tolerating regular diet        Hypokalemia-repleted   Assessment & Plan             Essential hypertension   Assessment & Plan     · ACE-restarted  · Continue norvasc    · /82          Hypothyroidism   Assessment & Plan     · Continue synthroid, TSH WNL          Anxiety   Assessment & Plan     · Has been on Celexa for 6 months without dosage change  · Continue at this time however depending on change in sodium levels may need to hold this      Stable for discharge home   Can follow up with GI for outpatient surveillance colonoscopy     Subjective:  Patient doing well  EGD reveals gastritis  No evidence of peptic ulcer disease  Biopsies taken  This will be reviewed with the patient on follow-up  Sodium has normalized to 139  Discharge Condition: Improved    Discharge Disposition: Home/Self Care    Discharge summary:   Kely Jiang is a 50y o  year old female  with a past medical history of gastritis, essential hypertension, dyslipidemia, hypothyroidism, anxiety who was admitted to [facility] on 05/16/2018 with intractable nausea vomiting and epigastric pain  The pain was described as burning and twisting  Patient had gastritis a year ago and was on a PPI and subsequently started on Carafate  Sodium on arrival was 125  A CT scan revealed thickening of the gastric antrum and duodenum with suspected peptic ulcer disease hence she was admitted for a workup  With IV fluids her sodium normalized to 139  Her EGD revealed only gastritis with no evidence of peptic ulcer disease  Biopsies have been taken for H pylori and will be reviewed with her once results are available -by GI  Her pain is resolved and she is tolerating a regular diet  She continues on Protonix and Carafate on discharge  She has been advised to follow up with GI as an outpatient for a surveillance colonoscopy as well  She remains stable for discharge          Discharge Medications   Please see Medical Reconciliation Discharge Form    Discharge Follow Up Appointments:   PCP  GI  Discharge  Statement   Total Time Spent today including physical exam, discussion with patient and family, and discharge arrangements/care 51 minutes

## 2018-05-18 NOTE — OP NOTE
OPERATIVE REPORT  PATIENT NAME: Becky Holman    :  1965  MRN: [id number]  Pt Location: [facility] ROOM 01    SURGERY DATE: 2018    Surgeon(s) and Role:     * Joycelyn Saxena DO - Primary    Preop Diagnosis:  Acute epigastric pain [R10 13]  Other acute gastritis without hemorrhage [K29 00]    Post-Op Diagnosis Codes:     * Acute epigastric pain [R10 13]     * Other acute gastritis without hemorrhage [K29 00]    Procedure(s) (LRB):  ESOPHAGOGASTRODUODENOSCOPY (EGD) with bx (N/A)    Specimen(s):  ID Type Source Tests Collected by Time Destination   1 : bx, R/O celiac Tissue Duodenum TISSUE EXAM Joycelyn Saxena DO 2018    2 : body bx, gastrits, R/O H  Pylori Tissue Stomach TISSUE EXAM Joycelyn Saxena DO 2018        Estimated Blood Loss:   Minimal    Drains:       Anesthesia Type:   IV Sedation with Anesthesia    Operative Indications:  Acute epigastric pain [R10 13]  Other acute gastritis without hemorrhage [K29 00]      Operative Findings:    ESOPHAGOGASTRODUODENOSCOPY    PROCEDURE: EGD    SEDATION: Monitored anesthesia care, check anesthesia records    ASA Class: 2    INDICATIONS: abdominal pain and dyspepsia    CONSENT:  Informed consent was obtained for the procedure, including sedation after explaining the risks and benefits of the procedure  Risks including but not limited to bleeding, perforation, infection, and missed lesion  PREPARATION:   Telemetry, pulse oximetry, blood pressure were monitored throughout the procedure  Patient was identified by myself both verbally and by visual inspection of ID band  DESCRIPTION:   Patient was placed in the left lateral decubitus position and was sedated with the above medication  The gastroscope was introduced in to the oropharynx and the esophagus was intubated under direct visualization  Scope was passed down the esophagus up to 2nd part of the duodenum   A careful inspection was made as the gastroscope was withdrawn, including a retroflexed view of the stomach; findings and interventions are described below  FINDINGS:    #1  Esophagus-normal esophagus    #2  Stomach-mild gastritis in the stomach, biopsied with cold forceps    #3  Duodenum-normal 1st and 2nd part of the duodenum         IMPRESSIONS:      Mild gastritis  Biopsied  RECOMMENDATIONS:     Await biopsy results  Can resume diet  COMPLICATIONS:  None; patient tolerated the procedure well            DISPOSITION: PACU           CONDITION: Stable          SIGNATURE: Jd Sanchez DO  DATE: May 18, 2018  TIME: 9:21 AM

## 2018-05-22 ENCOUNTER — HOSPITAL ENCOUNTER (EMERGENCY)
Facility: HOSPITAL | Age: 53
Discharge: HOME | End: 2018-05-22
Attending: EMERGENCY MEDICINE
Payer: MEDICARE

## 2018-05-22 VITALS
WEIGHT: 158 LBS | BODY MASS INDEX: 27.99 KG/M2 | SYSTOLIC BLOOD PRESSURE: 174 MMHG | DIASTOLIC BLOOD PRESSURE: 84 MMHG | TEMPERATURE: 97.5 F | RESPIRATION RATE: 16 BRPM | HEART RATE: 77 BPM | OXYGEN SATURATION: 100 %

## 2018-05-22 DIAGNOSIS — F41.9 ANXIETY: Primary | ICD-10-CM

## 2018-05-22 PROCEDURE — 99283 EMERGENCY DEPT VISIT LOW MDM: CPT

## 2018-05-22 RX ORDER — LORAZEPAM 1 MG/1
1 TABLET ORAL ONCE
Status: COMPLETED | OUTPATIENT
Start: 2018-05-22 | End: 2018-05-22

## 2018-05-22 RX ORDER — LORAZEPAM 1 MG/1
1 TABLET ORAL EVERY 8 HOURS PRN
Qty: 15 TABLET | Refills: 0 | Status: SHIPPED | OUTPATIENT
Start: 2018-05-22 | End: 2019-05-08 | Stop reason: HOSPADM

## 2018-05-22 RX ADMIN — LORAZEPAM 1 MG: 1 TABLET ORAL at 18:37

## 2018-05-22 NOTE — ED PROVIDER NOTES
History  Chief Complaint   Patient presents with    Panic Attack     Pt  reports feeling like she is having a panic attack  Hx of panic attacks, feels the same  Reports restlessness and shaking  Onset this morning  53-year-old female with a history of hypertension, hypothyroid and anxiety presents with which she believes is an anxiety attack  Patient states she woke this morning feeling very nervous and shaky  She does not have any triggering events  She states that she has not had an anxiety attack in several years  She used to take Klonopin but no longer does  She has no chest pain or shortness of breath  No recent illness  No suicidal ideations  History provided by:  Patient   used: No    Panic Attack   Presenting symptoms: no agitation, no depression and no suicidal thoughts    Patient accompanied by:  Family member  Degree of incapacity (severity): Unable to specify  Onset quality:  Gradual  Duration:  12 hours  Timing:  Constant  Progression:  Unchanged  Chronicity:  Recurrent  Context: not alcohol use, not drug abuse, not medication, not noncompliant, not recent medication change and not stressful life event    Treatment compliance:  Untreated  Relieved by:  None tried  Worsened by:  Nothing  Ineffective treatments:  None tried  Associated symptoms: anxiety    Associated symptoms: no abdominal pain, no chest pain, no fatigue, no headaches and no hyperventilation        Prior to Admission Medications   Prescriptions Last Dose Informant Patient Reported? Taking?    amLODIPine (NORVASC) 5 mg tablet   Yes Yes   Sig: Take 5 mg by mouth daily   citalopram (CeleXA) 20 mg tablet   Yes Yes   Sig: Take 20 mg by mouth daily   levothyroxine 50 mcg tablet   Yes Yes   Sig: Take 50 mcg by mouth daily   lisinopril 20 mg TABS 20 mg, hydrochlorothiazide 25 mg TABS 25 mg   Yes Yes   Sig: Take by mouth daily   metoprolol tartrate (LOPRESSOR) 50 mg tablet   Yes Yes   Sig: Take 1 tablet by mouth every 12 (twelve) hours   ondansetron (ZOFRAN-ODT) 4 mg disintegrating tablet   Yes Yes   Sig: Take by mouth   pantoprazole (PROTONIX) 40 mg tablet   Yes Yes   Sig: Take 40 mg by mouth   sucralfate (CARAFATE) 1 g tablet   No Yes   Sig: Take 1 tablet (1 g total) by mouth 4 (four) times a day (before meals and at bedtime) for 14 days      Facility-Administered Medications: None       Past Medical History:   Diagnosis Date    Bowel obstruction (HCC)     Gastritis     Hypertension        Past Surgical History:   Procedure Laterality Date    ABDOMINAL SURGERY      BOWEL RESECTION      ESOPHAGOGASTRODUODENOSCOPY N/A 5/18/2018    Procedure: ESOPHAGOGASTRODUODENOSCOPY (EGD) with bx;  Surgeon: Samaria Doherty DO;  Location: [facility]; Service: Gastroenterology    HYSTERECTOMY      KNEE SURGERY Bilateral        History reviewed  No pertinent family history  I have reviewed and agree with the history as documented  Social History   Substance Use Topics    Smoking status: Current Every Day Smoker     Packs/day: 0 50     Types: Cigarettes    Smokeless tobacco: Never Used    Alcohol use No        Review of Systems   Constitutional: Negative  Negative for chills, diaphoresis, fatigue and fever  HENT: Negative  Negative for congestion, rhinorrhea and sore throat  Eyes: Negative  Negative for discharge, redness and itching  Respiratory: Negative  Negative for apnea, cough, chest tightness, shortness of breath and wheezing  Cardiovascular: Negative for chest pain, palpitations and leg swelling  Gastrointestinal: Negative  Negative for abdominal pain  Endocrine: Negative  Genitourinary: Negative  Negative for flank pain, frequency and urgency  Musculoskeletal: Negative  Negative for back pain  Skin: Negative  Allergic/Immunologic: Negative  Neurological: Negative  Negative for dizziness, syncope, weakness, light-headedness, numbness and headaches  Hematological: Negative  Psychiatric/Behavioral: Negative for agitation and suicidal ideas  The patient is nervous/anxious  All other systems reviewed and are negative  Physical Exam  Physical Exam   Constitutional: She is oriented to person, place, and time  She appears well-developed and well-nourished  Non-toxic appearance  She does not have a sickly appearance  She does not appear ill  No distress  Patient seems anxious, pacing the room   HENT:   Head: Normocephalic and atraumatic  Right Ear: External ear normal    Left Ear: External ear normal    Mouth/Throat: Oropharynx is clear and moist    Eyes: Conjunctivae are normal  Pupils are equal, round, and reactive to light  Right eye exhibits no discharge  Left eye exhibits no discharge  No scleral icterus  Cardiovascular: Normal rate, regular rhythm and normal heart sounds  Exam reveals no gallop and no friction rub  No murmur heard  Pulmonary/Chest: Effort normal and breath sounds normal  No respiratory distress  She has no wheezes  She has no rales  She exhibits no tenderness  Abdominal: Soft  Bowel sounds are normal  She exhibits no distension and no mass  There is no tenderness  No hernia  Neurological: She is alert and oriented to person, place, and time  She has normal reflexes  She exhibits normal muscle tone  Skin: Skin is warm and dry  No rash noted  She is not diaphoretic  No erythema  No pallor  Psychiatric: Her speech is normal and behavior is normal  Thought content normal  Her mood appears anxious  She is not actively hallucinating  She is attentive  Nursing note and vitals reviewed        Vital Signs  ED Triage Vitals   Temperature Pulse Respirations Blood Pressure SpO2   05/22/18 1649 05/22/18 1649 05/22/18 1649 05/22/18 1649 05/22/18 1649   97 5 °F (36 4 °C) 85 18 (!) 188/92 99 %      Temp Source Heart Rate Source Patient Position - Orthostatic VS BP Location FiO2 (%)   05/22/18 1649 05/22/18 1854 05/22/18 1808 05/22/18 1808 --   Temporal Monitor Sitting Left arm       Pain Score       05/22/18 1649       No Pain           Vitals:    05/22/18 1649 05/22/18 1808 05/22/18 1854 05/22/18 1944   BP: (!) 188/92 170/93 (!) 187/88 (!) 174/84   Pulse: 85  88 77   Patient Position - Orthostatic VS:  Sitting Sitting Sitting       Visual Acuity      ED Medications  Medications   LORazepam (ATIVAN) tablet 1 mg (1 mg Oral Given 5/22/18 1837)       Diagnostic Studies  Results Reviewed     None                 No orders to display              Procedures  Procedures       Phone Contacts  ED Phone Contact    ED Course                               MDM  Number of Diagnoses or Management Options  Diagnosis management comments: 53-year-old female presents with anxiety  Patient states it started this morning  No triggering event  On exam she does seem anxious and shaky and is pacing the room  The rest of her exam is normal   Will try a dose of p  o  Ativan to see if this helps her symptoms  Will reassess  CritCare Time    Disposition  Final diagnoses:   Anxiety     Time reflects when diagnosis was documented in both MDM as applicable and the Disposition within this note     Time User Action Codes Description Comment    5/22/2018  7:47 PM Kylie Pham Add [F41 9] Anxiety       ED Disposition     ED Disposition Condition Comment    Discharge  May Beat discharge to home/self care      Condition at discharge: Good        Follow-up Information     Follow up With Specialties Details Why [address]  [facility], [address] [facility] Nurse Practitioner Schedule an appointment as soon as possible for a visit in 2 days  [address] [facility] [address] [phone]            Patient's Medications   Discharge Prescriptions    LORAZEPAM (ATIVAN) 1 MG TABLET    Take 1 tablet (1 mg total) by mouth every 8 (eight) hours as needed for anxiety       Start Date: 5/22/2018 End Date: --       Order Dose: 1 mg       Quantity: 15 tablet    Refills: 0     No discharge procedures on file      ED Provider  Electronically Signed by           Melissa Khan DO  05/22/18 1940

## 2018-05-22 NOTE — DISCHARGE INSTRUCTIONS
Anxiety   WHAT YOU NEED TO KNOW:   Anxiety is a condition that causes you to feel extremely worried or nervous  The feelings are so strong that they can cause problems with your daily activities or sleep  Anxiety may be triggered by something you fear, or it may happen without a cause  Family or work stress, smoking, caffeine, and alcohol can increase your risk for anxiety  Certain medicines or health conditions can also increase your risk  Anxiety can become a long-term condition if it is not managed or treated  DISCHARGE INSTRUCTIONS:   Call 911 if:   · You have chest pain, tightness, or heaviness that may spread to your shoulders, arms, jaw, neck, or back  · You feel like hurting yourself or someone else  Contact your healthcare provider if:   · Your symptoms get worse or do not get better with treatment  · Your anxiety keeps you from doing your regular daily activities  · You have new symptoms since your last visit  · You have questions or concerns about your condition or care  Medicines:   · Medicines  may be given to help you feel more calm and relaxed, and decrease your symptoms  · Take your medicine as directed  Contact your healthcare provider if you think your medicine is not helping or if you have side effects  Tell him of her if you are allergic to any medicine  Keep a list of the medicines, vitamins, and herbs you take  Include the amounts, and when and why you take them  Bring the list or the pill bottles to follow-up visits  Carry your medicine list with you in case of an emergency  Follow up with your healthcare provider within 2 weeks or as directed:  Write down your questions so you remember to ask them during your visits  Manage anxiety:   · Talk to someone about your anxiety  Your healthcare provider may suggest counseling  Cognitive behavioral therapy can help you understand and change how you react to events that trigger your symptoms   You might feel more comfortable talking with a friend or family member about your anxiety  Choose someone you know will be supportive and encouraging  · Find ways to relax  Activities such as exercise, meditation, or listening to music can help you relax  Spend time with friends, or do things you enjoy  · Practice deep breathing  Deep breathing can help you relax when you feel anxious  Focus on taking slow, deep breaths several times a day, or during an anxiety attack  Breathe in through your nose and out through your mouth  · Create a regular sleep routine  Regular sleep can help you feel calmer during the day  Go to sleep and wake up at the same times every day  Do not watch television or use the computer right before bed  Your room should be comfortable, dark, and quiet  · Eat a variety of healthy foods  Healthy foods include fruits, vegetables, low-fat dairy products, lean meats, fish, whole-grain breads, and cooked beans  Healthy foods can help you feel less anxious and have more energy  · Exercise regularly  Exercise can increase your energy level  Exercise may also lift your mood and help you sleep better  Your healthcare provider can help you create an exercise plan  · Do not smoke  Nicotine and other chemicals in cigarettes and cigars can increase anxiety  Ask your healthcare provider for information if you currently smoke and need help to quit  E-cigarettes or smokeless tobacco still contain nicotine  Talk to your healthcare provider before you use these products  · Do not have caffeine  Caffeine can make your symptoms worse  Do not have foods or drinks that are meant to increase your energy level  · Limit or do not drink alcohol  Ask your healthcare provider if alcohol is safe for you  You may not be able to drink alcohol if you take certain anxiety or depression medicines  Limit alcohol to 1 drink per day if you are a woman  Limit alcohol to 2 drinks per day if you are a man   A drink of alcohol is 12 ounces of beer, 5 ounces of wine, or 1½ ounces of liquor  · Do not use drugs  Drugs can make your anxiety worse  It can also make anxiety hard to manage  Talk to your healthcare provider if you use drugs and want help to quit  © 2017 [address] Information is for End User's use only and may not be sold, redistributed or otherwise used for commercial purposes  All illustrations and images included in CareNotes® are the copyrighted property of [employer]  or [employer]son  The above information is an  only  It is not intended as medical advice for individual conditions or treatments  Talk to your doctor, nurse or pharmacist before following any medical regimen to see if it is safe and effective for you

## 2018-06-19 ENCOUNTER — HOSPITAL ENCOUNTER (EMERGENCY)
Facility: HOSPITAL | Age: 53
Discharge: HOME | End: 2018-06-19
Attending: EMERGENCY MEDICINE | Admitting: EMERGENCY MEDICINE
Payer: MEDICARE

## 2018-06-19 VITALS
SYSTOLIC BLOOD PRESSURE: 137 MMHG | BODY MASS INDEX: 27.46 KG/M2 | TEMPERATURE: 98.1 F | DIASTOLIC BLOOD PRESSURE: 76 MMHG | OXYGEN SATURATION: 99 % | RESPIRATION RATE: 16 BRPM | HEART RATE: 73 BPM | WEIGHT: 155 LBS

## 2018-06-19 DIAGNOSIS — F41.9 ANXIETY: ICD-10-CM

## 2018-06-19 DIAGNOSIS — R11.10 VOMITING: Primary | ICD-10-CM

## 2018-06-19 DIAGNOSIS — K29.70 GASTRITIS: ICD-10-CM

## 2018-06-19 LAB
ALBUMIN SERPL BCP-MCNC: 4.5 G/DL (ref 3.5–5)
ALP SERPL-CCNC: 117 U/L (ref 46–116)
ALT SERPL W P-5'-P-CCNC: 81 U/L (ref 12–78)
ANION GAP SERPL CALCULATED.3IONS-SCNC: 10 MMOL/L (ref 4–13)
AST SERPL W P-5'-P-CCNC: 44 U/L (ref 5–45)
BACTERIA UR QL AUTO: ABNORMAL /HPF
BASOPHILS # BLD AUTO: 0.02 THOUSANDS/ΜL (ref 0–0.1)
BASOPHILS NFR BLD AUTO: 0 % (ref 0–1)
BILIRUB SERPL-MCNC: 0.51 MG/DL (ref 0.2–1)
BILIRUB UR QL STRIP: NEGATIVE
BUN SERPL-MCNC: 6 MG/DL (ref 5–25)
CALCIUM SERPL-MCNC: 9.2 MG/DL (ref 8.3–10.1)
CHLORIDE SERPL-SCNC: 100 MMOL/L (ref 100–108)
CLARITY UR: ABNORMAL
CO2 SERPL-SCNC: 30 MMOL/L (ref 21–32)
COLOR UR: YELLOW
COLOR, POC: YELLOW
CREAT SERPL-MCNC: 0.9 MG/DL (ref 0.6–1.3)
EOSINOPHIL # BLD AUTO: 0.09 THOUSAND/ΜL (ref 0–0.61)
EOSINOPHIL NFR BLD AUTO: 1 % (ref 0–6)
ERYTHROCYTE [DISTWIDTH] IN BLOOD BY AUTOMATED COUNT: 13.1 % (ref 11.6–15.1)
GFR SERPL CREATININE-BSD FRML MDRD: 75 ML/MIN/1.73SQ M
GLUCOSE SERPL-MCNC: 109 MG/DL (ref 65–140)
GLUCOSE UR STRIP-MCNC: NEGATIVE MG/DL
HCT VFR BLD AUTO: 46.5 % (ref 34.8–46.1)
HGB BLD-MCNC: 16.3 G/DL (ref 11.5–15.4)
HGB UR QL STRIP.AUTO: NEGATIVE
IMM GRANULOCYTES # BLD AUTO: 0.04 THOUSAND/UL (ref 0–0.2)
IMM GRANULOCYTES NFR BLD AUTO: 0 % (ref 0–2)
KETONES UR STRIP-MCNC: NEGATIVE MG/DL
LEUKOCYTE ESTERASE UR QL STRIP: NEGATIVE
LIPASE SERPL-CCNC: 132 U/L (ref 73–393)
LYMPHOCYTES # BLD AUTO: 1.63 THOUSANDS/ΜL (ref 0.6–4.47)
LYMPHOCYTES NFR BLD AUTO: 16 % (ref 14–44)
MCH RBC QN AUTO: 34.2 PG (ref 26.8–34.3)
MCHC RBC AUTO-ENTMCNC: 35.1 G/DL (ref 31.4–37.4)
MCV RBC AUTO: 98 FL (ref 82–98)
MONOCYTES # BLD AUTO: 0.5 THOUSAND/ΜL (ref 0.17–1.22)
MONOCYTES NFR BLD AUTO: 5 % (ref 4–12)
NEUTROPHILS # BLD AUTO: 7.7 THOUSANDS/ΜL (ref 1.85–7.62)
NEUTS SEG NFR BLD AUTO: 78 % (ref 43–75)
NITRITE UR QL STRIP: NEGATIVE
NON-SQ EPI CELLS URNS QL MICRO: ABNORMAL /HPF
NRBC BLD AUTO-RTO: 0 /100 WBCS
PH UR STRIP.AUTO: 6.5 [PH] (ref 4.5–8)
PLATELET # BLD AUTO: 288 THOUSANDS/UL (ref 149–390)
PMV BLD AUTO: 9.2 FL (ref 8.9–12.7)
POTASSIUM SERPL-SCNC: 4.3 MMOL/L (ref 3.5–5.3)
PROT SERPL-MCNC: 8.7 G/DL (ref 6.4–8.2)
PROT UR STRIP-MCNC: ABNORMAL MG/DL
RBC # BLD AUTO: 4.76 MILLION/UL (ref 3.81–5.12)
RBC #/AREA URNS AUTO: ABNORMAL /HPF
SODIUM SERPL-SCNC: 140 MMOL/L (ref 136–145)
SP GR UR STRIP.AUTO: 1.02 (ref 1–1.03)
UROBILINOGEN UR QL STRIP.AUTO: 1 E.U./DL
WBC # BLD AUTO: 9.94 THOUSAND/UL (ref 4.31–10.16)
WBC #/AREA URNS AUTO: ABNORMAL /HPF

## 2018-06-19 PROCEDURE — 81001 URINALYSIS AUTO W/SCOPE: CPT

## 2018-06-19 PROCEDURE — 85025 COMPLETE CBC W/AUTO DIFF WBC: CPT | Performed by: EMERGENCY MEDICINE

## 2018-06-19 PROCEDURE — 96375 TX/PRO/DX INJ NEW DRUG ADDON: CPT

## 2018-06-19 PROCEDURE — 80053 COMPREHEN METABOLIC PANEL: CPT | Performed by: EMERGENCY MEDICINE

## 2018-06-19 PROCEDURE — 96361 HYDRATE IV INFUSION ADD-ON: CPT

## 2018-06-19 PROCEDURE — 96374 THER/PROPH/DIAG INJ IV PUSH: CPT

## 2018-06-19 PROCEDURE — 36415 COLL VENOUS BLD VENIPUNCTURE: CPT | Performed by: EMERGENCY MEDICINE

## 2018-06-19 PROCEDURE — 99283 EMERGENCY DEPT VISIT LOW MDM: CPT

## 2018-06-19 PROCEDURE — 83690 ASSAY OF LIPASE: CPT | Performed by: EMERGENCY MEDICINE

## 2018-06-19 RX ORDER — OMEPRAZOLE 20 MG/1
20 CAPSULE, DELAYED RELEASE ORAL DAILY
Qty: 20 CAPSULE | Refills: 0 | Status: SHIPPED | OUTPATIENT
Start: 2018-06-19 | End: 2019-05-08 | Stop reason: HOSPADM

## 2018-06-19 RX ORDER — ONDANSETRON 4 MG/1
4 TABLET, FILM COATED ORAL EVERY 6 HOURS
Qty: 12 TABLET | Refills: 0 | Status: SHIPPED | OUTPATIENT
Start: 2018-06-19 | End: 2019-05-08 | Stop reason: HOSPADM

## 2018-06-19 RX ORDER — ONDANSETRON 2 MG/ML
4 INJECTION INTRAMUSCULAR; INTRAVENOUS ONCE
Status: COMPLETED | OUTPATIENT
Start: 2018-06-19 | End: 2018-06-19

## 2018-06-19 RX ORDER — LORAZEPAM 1 MG/1
1 TABLET ORAL 2 TIMES DAILY PRN
Qty: 15 TABLET | Refills: 0 | Status: SHIPPED | OUTPATIENT
Start: 2018-06-19 | End: 2019-05-08 | Stop reason: HOSPADM

## 2018-06-19 RX ORDER — LORAZEPAM 2 MG/ML
1 INJECTION INTRAMUSCULAR ONCE
Status: COMPLETED | OUTPATIENT
Start: 2018-06-19 | End: 2018-06-19

## 2018-06-19 RX ADMIN — FAMOTIDINE 20 MG: 10 INJECTION, SOLUTION INTRAVENOUS at 10:59

## 2018-06-19 RX ADMIN — ONDANSETRON 4 MG: 2 INJECTION INTRAMUSCULAR; INTRAVENOUS at 10:59

## 2018-06-19 RX ADMIN — SODIUM CHLORIDE 1000 ML: 0.9 INJECTION, SOLUTION INTRAVENOUS at 10:59

## 2018-06-19 RX ADMIN — LORAZEPAM 1 MG: 2 INJECTION INTRAMUSCULAR; INTRAVENOUS at 10:59

## 2018-06-19 NOTE — DISCHARGE INSTRUCTIONS
Acute Nausea and Vomiting, Ambulatory Care   GENERAL INFORMATION:   Acute nausea and vomiting  starts suddenly, gets worse quickly, and lasts a short time  Nausea and vomiting may be caused by pregnancy, alcohol, infection, or medicines  Common related symptoms include the following:   · Fever    · Abdominal swelling    · Pain, tenderness, or a lump in the abdomen    · Splashing sounds heard in your stomach when you move  Seek immediate care for the following symptoms:   · Blood in your vomit or bowel movements    · Sudden, severe pain in your chest and upper abdomen after hard vomiting    · Dizziness, dry mouth, and thirst    · Urinating very little or not at all    · Muscle weakness, leg cramps, and trouble breathing    · A heart beat that is faster than normal    · Vomiting for more than 48 hours  Treatment for acute nausea and vomiting  may include medicines to calm your stomach and stop the vomiting  You may need IV fluids if you are dehydrated  Manage your nausea and vomiting:   · Drink liquids as directed to prevent dehydration  Ask how much liquid to drink each day and which liquids are best for you  You may need to drink an oral rehydration solution (ORS)  ORS contains water, salts, and sugar that are needed to replace the lost body fluids  Ask what kind of ORS to use, how much to drink, and where to get it  · Eat smaller meals, more often  Eat small amounts of food every 2 to 3 hours, even if you are not hungry  Food in your stomach may help decrease your nausea  · Avoid stress  Find ways to relax and manage your stress  Headaches due to stress may cause nausea and vomiting  Get more rest and sleep  Follow up with your healthcare provider as directed:  Write down your questions so you remember to ask them during your visits  CARE AGREEMENT:   You have the right to help plan your care  Learn about your health condition and how it may be treated   Discuss treatment options with your caregivers to decide what care you want to receive  You always have the right to refuse treatment  The above information is an  only  It is not intended as medical advice for individual conditions or treatments  Talk to your doctor, nurse or pharmacist before following any medical regimen to see if it is safe and effective for you  © 2014 [address] is for End User's use only and may not be sold, redistributed or otherwise used for commercial purposes  All illustrations and images included in CareNotes® are the copyrighted property of [employer]  or [employer]  Gastritis   WHAT YOU NEED TO KNOW:   Gastritis is inflammation or irritation of the lining of your stomach  DISCHARGE INSTRUCTIONS:   Call 911 for any of the following:   · You develop chest pain or shortness of breath  Return to the emergency department if:   · You vomit blood  · You have black or bloody bowel movements  · You have severe stomach or back pain  Contact your healthcare provider if:   · You have a fever  · You have new or worsening symptoms, even after treatment  · You have questions or concerns about your condition or care  Medicines:   · Medicines  may be given to help treat a bacterial infection or decrease stomach acid  · Take your medicine as directed  Contact your healthcare provider if you think your medicine is not helping or if you have side effects  Tell him or her if you are allergic to any medicine  Keep a list of the medicines, vitamins, and herbs you take  Include the amounts, and when and why you take them  Bring the list or the pill bottles to follow-up visits  Carry your medicine list with you in case of an emergency  Manage or prevent gastritis:   · Do not smoke  Nicotine and other chemicals in cigarettes and cigars can make your symptoms worse and cause lung damage   Ask your healthcare provider for information if you currently smoke and need help to quit  E-cigarettes or smokeless tobacco still contain nicotine  Talk to your healthcare provider before you use these products  · Do not drink alcohol  Alcohol can prevent healing and make your gastritis worse  Talk to your healthcare provider if you need help to stop drinking  · Do not take NSAIDs or aspirin unless directed  These and similar medicines can cause irritation  If your healthcare provider says it is okay to take NSAIDs, take them with food  · Do not eat foods that cause irritation  Foods such as oranges and salsa can cause burning or pain  Eat a variety of healthy foods  Examples include fruits (not citrus), vegetables, low-fat dairy products, beans, whole-grain breads, and lean meats and fish  Try to eat small meals, and drink water with your meals  Do not eat for at least 3 hours before you go to bed  · Find ways to relax and decrease stress  Stress can increase stomach acid and make gastritis worse  Activities such as yoga, meditation, or listening to music can help you relax  Spend time with friends, or do things you enjoy  Follow up with your healthcare provider as directed: You may need ongoing tests or treatment, or referral to a gastroenterologist  Write down your questions so you remember to ask them during your visits  © 2017 [address]  Information is for End User's use only and may not be sold, redistributed or otherwise used for commercial purposes  All illustrations and images included in CareNotes® are the copyrighted property of [employer]  or [employer]son  The above information is an  only  It is not intended as medical advice for individual conditions or treatments  Talk to your doctor, nurse or pharmacist before following any medical regimen to see if it is safe and effective for you

## 2018-06-19 NOTE — ED PROVIDER NOTES
History  Chief Complaint   Patient presents with    Vomiting     Pt has had vomiting, diarrhea and pain for the past two days  Denies sick contacts  Feel very anxious and is shaky in triage  C/o nausea and vomiting for 2 days, not able to tolerate anything by mouth  Has some upper abd  Burning  Pt  Has some diarrhea (but chronically has diarrhea)  No fevers, no urinary symptoms  S/p hysterectomy  She's had bowel obstructions and resections in the past but had a lot of pain with that - now doesn't have abd,  pain            Prior to Admission Medications   Prescriptions Last Dose Informant Patient Reported? Taking? LORazepam (ATIVAN) 1 mg tablet   No Yes   Sig: Take 1 tablet (1 mg total) by mouth every 8 (eight) hours as needed for anxiety   amLODIPine (NORVASC) 5 mg tablet   Yes Yes   Sig: Take 5 mg by mouth daily   citalopram (CeleXA) 20 mg tablet   Yes Yes   Sig: Take 20 mg by mouth daily   levothyroxine 50 mcg tablet   Yes Yes   Sig: Take 50 mcg by mouth daily   lisinopril 20 mg TABS 20 mg, hydrochlorothiazide 25 mg TABS 25 mg   Yes Yes   Sig: Take by mouth daily   metoprolol tartrate (LOPRESSOR) 50 mg tablet   Yes Yes   Sig: Take 1 tablet by mouth every 12 (twelve) hours   pantoprazole (PROTONIX) 40 mg tablet   Yes Yes   Sig: Take 40 mg by mouth   sucralfate (CARAFATE) 1 g tablet   No No   Sig: Take 1 tablet (1 g total) by mouth 4 (four) times a day (before meals and at bedtime) for 14 days      Facility-Administered Medications: None       Past Medical History:   Diagnosis Date    Bowel obstruction (HCC)     Gastritis     Hypertension        Past Surgical History:   Procedure Laterality Date    ABDOMINAL SURGERY      BOWEL RESECTION      ESOPHAGOGASTRODUODENOSCOPY N/A 5/18/2018    Procedure: ESOPHAGOGASTRODUODENOSCOPY (EGD) with bx;  Surgeon: Basilio Isaac DO;  Location: [facility]; Service: Gastroenterology    HYSTERECTOMY      KNEE SURGERY Bilateral        History reviewed   No pertinent family history  I have reviewed and agree with the history as documented  Social History   Substance Use Topics    Smoking status: Current Every Day Smoker     Packs/day: 0 50     Types: Cigarettes    Smokeless tobacco: Never Used    Alcohol use No        Review of Systems   Constitutional: Negative for appetite change, fatigue and fever  HENT: Negative for rhinorrhea and sore throat  Respiratory: Negative for cough, shortness of breath and wheezing  Cardiovascular: Negative for chest pain and leg swelling  Gastrointestinal: Positive for abdominal pain, diarrhea, nausea and vomiting  Genitourinary: Negative for dysuria and flank pain  Musculoskeletal: Negative for back pain and neck pain  Skin: Negative for rash  Neurological: Negative for syncope and headaches  Psychiatric/Behavioral:        Anxious       Physical Exam  Physical Exam   Constitutional: She is oriented to person, place, and time  She appears well-developed and well-nourished  HENT:   Head: Normocephalic and atraumatic  Mouth/Throat: Oropharynx is clear and moist    Neck: Normal range of motion  Neck supple  Cardiovascular: Normal rate and regular rhythm  Pulmonary/Chest: Effort normal and breath sounds normal    Abdominal: Soft  There is no tenderness  Musculoskeletal: Normal range of motion  Neurological: She is alert and oriented to person, place, and time  Skin: Skin is warm and dry  Psychiatric:   anxious   Nursing note and vitals reviewed        Vital Signs  ED Triage Vitals [06/19/18 0935]   Temperature Pulse Respirations Blood Pressure SpO2   98 1 °F (36 7 °C) 76 18 (!) 162/109 99 %      Temp Source Heart Rate Source Patient Position - Orthostatic VS BP Location FiO2 (%)   Temporal Monitor Sitting Right arm --      Pain Score       6           Vitals:    06/19/18 0935 06/19/18 1158   BP: (!) 162/109 137/76   Pulse: 76 73   Patient Position - Orthostatic VS: Sitting Lying       Visual Acuity      ED Medications  Medications   sodium chloride 0 9 % bolus 1,000 mL (0 mL Intravenous Stopped 6/19/18 1159)   ondansetron (ZOFRAN) injection 4 mg (4 mg Intravenous Given 6/19/18 1059)   famotidine (PEPCID) injection 20 mg (20 mg Intravenous Given 6/19/18 1059)   LORazepam (ATIVAN) 2 mg/mL injection 1 mg (1 mg Intravenous Given 6/19/18 1059)       Diagnostic Studies  Results Reviewed     Procedure Component Value Units Date/Time    CBC and differential [[id number]]  (Abnormal) Collected:  06/19/18 1040    Lab Status:  Final result Specimen:  Blood from Arm, Right Updated:  06/19/18 1207     WBC 9 94 Thousand/uL      RBC 4 76 Million/uL      Hemoglobin 16 3 (H) g/dL      Hematocrit 46 5 (H) %      MCV 98 fL      MCH 34 2 pg      MCHC 35 1 g/dL      RDW 13 1 %      MPV 9 2 fL      Platelets 964 Thousands/uL      nRBC 0 /100 WBCs      Neutrophils Relative 78 (H) %      Immat GRANS % 0 %      Lymphocytes Relative 16 %      Monocytes Relative 5 %      Eosinophils Relative 1 %      Basophils Relative 0 %      Neutrophils Absolute 7 70 (H) Thousands/µL      Immature Grans Absolute 0 04 Thousand/uL      Lymphocytes Absolute 1 63 Thousands/µL      Monocytes Absolute 0 50 Thousand/µL      Eosinophils Absolute 0 09 Thousand/µL      Basophils Absolute 0 02 Thousands/µL     Narrative: This is an appended report  These results have been appended to a previously verified report      Comprehensive metabolic panel [[id number]]  (Abnormal) Collected:  06/19/18 1040    Lab Status:  Final result Specimen:  Blood from Arm, Right Updated:  06/19/18 1139     Sodium 140 mmol/L      Potassium 4 3 mmol/L      Chloride 100 mmol/L      CO2 30 mmol/L      Anion Gap 10 mmol/L      BUN 6 mg/dL      Creatinine 0 90 mg/dL      Glucose 109 mg/dL      Calcium 9 2 mg/dL      AST 44 U/L      ALT 81 (H) U/L      Alkaline Phosphatase 117 (H) U/L      Total Protein 8 7 (H) g/dL      Albumin 4 5 g/dL      Total Bilirubin 0 51 mg/dL      eGFR 75 ml/min/1 73sq m     Narrative:         National Kidney Disease Education Program recommendations are as follows:  GFR calculation is accurate only with a steady state creatinine  Chronic Kidney disease less than 60 ml/min/1 73 sq  meters  Kidney failure less than 15 ml/min/1 73 sq  meters  Lipase [[id number]]  (Normal) Collected:  06/19/18 1040    Lab Status:  Final result Specimen:  Blood from Arm, Right Updated:  06/19/18 1139     Lipase 132 u/L     POCT urinalysis dipstick [[id number]]  (Normal) Resulted:  06/19/18 1033    Lab Status:  Final result Specimen:  Urine Updated:  06/19/18 1114     Color, UA yellow    Urine Microscopic [[id number]]  (Abnormal) Collected:  06/19/18 1033    Lab Status:  Final result Specimen:  Urine from Urine, Clean Catch Updated:  06/19/18 1055     RBC, UA None Seen /hpf      WBC, UA 0-1 (A) /hpf      Epithelial Cells Occasional /hpf      Bacteria, UA Occasional /hpf     ED Urine Macroscopic [[id number]]  (Abnormal) Collected:  06/19/18 1033    Lab Status:  Final result Specimen:  Urine Updated:  06/19/18 1028     Color, UA Yellow     Clarity, UA Cloudy     pH, UA 6 5     Leukocytes, UA Negative     Nitrite, UA Negative     Protein, UA 30 (1+) (A) mg/dl      Glucose, UA Negative mg/dl      Ketones, UA Negative mg/dl      Urobilinogen, UA 1 0 E U /dl      Bilirubin, UA Negative     Blood, UA Negative     Specific Gravity, UA 1 025    Narrative:       [facility] RESULT                 No orders to display              Procedures  Procedures       Phone Contacts  ED Phone Contact    ED Course                               MDM  Number of Diagnoses or Management Options  Anxiety:   Gastritis:   Vomiting:      Amount and/or Complexity of Data Reviewed  Clinical lab tests: ordered and reviewed    Risk of Complications, Morbidity, and/or Mortality  Presenting problems: moderate  General comments: Pt   Felt better after meds and iv fluids      CritCare Time    Disposition  Final diagnoses:   Vomiting Gastritis   Anxiety     Time reflects when diagnosis was documented in both MDM as applicable and the Disposition within this note     Time User Action Codes Description Comment    6/19/2018 11:53 AM Lopez-Rodriguez, Tyrus Apley R Add [R11 10] Vomiting     6/19/2018 11:53 AM Gegesymone SANDERS Add [K29 70] Gastritis     6/19/2018 12:06 PM Lincoln Mcclure Add [F41 9] Anxiety       ED Disposition     ED Disposition Condition Comment    Discharge  Dannial Minus discharge to home/self care      Condition at discharge: Stable        Follow-up Information     Follow up With Specialties Details Why [address]  [facility], [facility] Nurse Practitioner   [address] [facility]  [address] [address] [phone]            Discharge Medication List as of 6/19/2018 12:01 PM      START taking these medications    Details   omeprazole (PriLOSEC) 20 mg delayed release capsule Take 1 capsule (20 mg total) by mouth daily, Starting Tue 6/19/2018, Print      ondansetron (ZOFRAN) 4 mg tablet Take 1 tablet (4 mg total) by mouth every 6 (six) hours, Starting Tue 6/19/2018, Print         CONTINUE these medications which have NOT CHANGED    Details   amLODIPine (NORVASC) 5 mg tablet Take 5 mg by mouth daily, Historical Med      citalopram (CeleXA) 20 mg tablet Take 20 mg by mouth daily, Historical Med      levothyroxine 50 mcg tablet Take 50 mcg by mouth daily, Historical Med      lisinopril 20 mg TABS 20 mg, hydrochlorothiazide 25 mg TABS 25 mg Take by mouth daily, Historical Med      LORazepam (ATIVAN) 1 mg tablet Take 1 tablet (1 mg total) by mouth every 8 (eight) hours as needed for anxiety, Starting Tue 5/22/2018, Print      metoprolol tartrate (LOPRESSOR) 50 mg tablet Take 1 tablet by mouth every 12 (twelve) hours, Historical Med      pantoprazole (PROTONIX) 40 mg tablet Take 40 mg by mouth, Starting Thu 12/14/2017, Historical Med      sucralfate (CARAFATE) 1 g tablet Take 1 tablet (1 g total) by mouth 4 (four) times a day (before meals and at bedtime) for 14 days, Starting Sun 4/22/2018, Until Tue 5/22/2018, Print           No discharge procedures on file      ED Provider  Electronically Signed by           Barbra Litten, MD  06/22/18 [id number]

## 2018-08-09 ENCOUNTER — APPOINTMENT (EMERGENCY)
Dept: RADIOLOGY | Facility: HOSPITAL | Age: 53
End: 2018-08-09
Payer: MEDICARE

## 2018-08-09 ENCOUNTER — HOSPITAL ENCOUNTER (EMERGENCY)
Facility: HOSPITAL | Age: 53
Discharge: HOME | End: 2018-08-10
Attending: EMERGENCY MEDICINE | Admitting: EMERGENCY MEDICINE
Payer: MEDICARE

## 2018-08-09 DIAGNOSIS — F10.929 ALCOHOL INTOXICATION (HCC): Primary | ICD-10-CM

## 2018-08-09 DIAGNOSIS — S93.402A LEFT ANKLE SPRAIN: ICD-10-CM

## 2018-08-09 DIAGNOSIS — F32.A DEPRESSION: ICD-10-CM

## 2018-08-09 DIAGNOSIS — F10.10 ALCOHOL ABUSE: ICD-10-CM

## 2018-08-09 LAB
ALBUMIN SERPL BCP-MCNC: 4.3 G/DL (ref 3–5.2)
ALP SERPL-CCNC: 108 U/L (ref 43–122)
ALT SERPL W P-5'-P-CCNC: 66 U/L (ref 9–52)
AMPHETAMINES SERPL QL SCN: NEGATIVE
ANION GAP SERPL CALCULATED.3IONS-SCNC: 12 MMOL/L (ref 5–14)
APAP SERPL-MCNC: <10 UG/ML (ref 10–30)
AST SERPL W P-5'-P-CCNC: 61 U/L (ref 14–36)
BARBITURATES UR QL: NEGATIVE
BASOPHILS # BLD AUTO: 0 THOUSANDS/ΜL (ref 0–0.1)
BASOPHILS NFR BLD AUTO: 0 % (ref 0–1)
BENZODIAZ UR QL: NEGATIVE
BILIRUB SERPL-MCNC: 0.7 MG/DL
BILIRUB UR QL STRIP: NEGATIVE
BUN SERPL-MCNC: 8 MG/DL (ref 5–25)
CALCIUM SERPL-MCNC: 8.9 MG/DL (ref 8.4–10.2)
CHLORIDE SERPL-SCNC: 97 MMOL/L (ref 97–108)
CLARITY UR: CLEAR
CO2 SERPL-SCNC: 26 MMOL/L (ref 22–30)
COCAINE UR QL: NEGATIVE
COLOR UR: NORMAL
CREAT SERPL-MCNC: 0.62 MG/DL (ref 0.6–1.2)
EOSINOPHIL # BLD AUTO: 0.1 THOUSAND/ΜL (ref 0–0.4)
EOSINOPHIL NFR BLD AUTO: 2 % (ref 0–6)
ERYTHROCYTE [DISTWIDTH] IN BLOOD BY AUTOMATED COUNT: 13.2 %
ETHANOL SERPL-MCNC: 358 MG/DL (ref 0–10)
GFR SERPL CREATININE-BSD FRML MDRD: 105 ML/MIN/1.73SQ M
GLUCOSE SERPL-MCNC: 97 MG/DL (ref 70–99)
GLUCOSE UR STRIP-MCNC: NEGATIVE MG/DL
HCT VFR BLD AUTO: 44.9 % (ref 36–46)
HGB BLD-MCNC: 15.7 G/DL (ref 12–16)
HGB UR QL STRIP.AUTO: NEGATIVE
KETONES UR STRIP-MCNC: NEGATIVE MG/DL
LEUKOCYTE ESTERASE UR QL STRIP: NEGATIVE
LIPASE SERPL-CCNC: 132 U/L (ref 23–300)
LYMPHOCYTES # BLD AUTO: 3.5 THOUSANDS/ΜL (ref 0.5–4)
LYMPHOCYTES NFR BLD AUTO: 42 % (ref 20–50)
MAGNESIUM SERPL-MCNC: 2.1 MG/DL (ref 1.6–2.3)
MCH RBC QN AUTO: 33.7 PG (ref 26–34)
MCHC RBC AUTO-ENTMCNC: 34.9 G/DL (ref 31–36)
MCV RBC AUTO: 97 FL (ref 80–100)
METHADONE UR QL: NEGATIVE
MONOCYTES # BLD AUTO: 0.7 THOUSAND/ΜL (ref 0.2–0.9)
MONOCYTES NFR BLD AUTO: 9 % (ref 1–10)
NEUTROPHILS # BLD AUTO: 3.8 THOUSANDS/ΜL (ref 1.8–7.8)
NEUTS SEG NFR BLD AUTO: 47 % (ref 45–65)
NITRITE UR QL STRIP: NEGATIVE
OPIATES UR QL SCN: NEGATIVE
PCP UR QL: NEGATIVE
PH UR STRIP.AUTO: 5 [PH] (ref 4.5–8)
PHOSPHATE SERPL-MCNC: 3.9 MG/DL (ref 2.5–4.8)
PLATELET # BLD AUTO: 251 THOUSANDS/UL (ref 150–450)
PMV BLD AUTO: 7.4 FL (ref 8.9–12.7)
POTASSIUM SERPL-SCNC: 3.7 MMOL/L (ref 3.6–5)
PROT SERPL-MCNC: 7.9 G/DL (ref 5.9–8.4)
PROT UR STRIP-MCNC: NEGATIVE MG/DL
RBC # BLD AUTO: 4.64 MILLION/UL (ref 4–5.2)
SALICYLATES SERPL-MCNC: <1 MG/DL (ref 10–30)
SODIUM SERPL-SCNC: 135 MMOL/L (ref 137–147)
SP GR UR STRIP.AUTO: 1.01 (ref 1–1.04)
THC UR QL: NEGATIVE
TROPONIN I SERPL-MCNC: <0.01 NG/ML (ref 0–0.03)
UROBILINOGEN UA: NEGATIVE MG/DL
WBC # BLD AUTO: 8.1 THOUSAND/UL (ref 4.5–11)

## 2018-08-09 PROCEDURE — 93005 ELECTROCARDIOGRAM TRACING: CPT

## 2018-08-09 PROCEDURE — 80307 DRUG TEST PRSMV CHEM ANLYZR: CPT | Performed by: EMERGENCY MEDICINE

## 2018-08-09 PROCEDURE — 80329 ANALGESICS NON-OPIOID 1 OR 2: CPT | Performed by: EMERGENCY MEDICINE

## 2018-08-09 PROCEDURE — 80320 DRUG SCREEN QUANTALCOHOLS: CPT | Performed by: EMERGENCY MEDICINE

## 2018-08-09 PROCEDURE — 83690 ASSAY OF LIPASE: CPT | Performed by: EMERGENCY MEDICINE

## 2018-08-09 PROCEDURE — 80053 COMPREHEN METABOLIC PANEL: CPT | Performed by: EMERGENCY MEDICINE

## 2018-08-09 PROCEDURE — 85025 COMPLETE CBC W/AUTO DIFF WBC: CPT | Performed by: EMERGENCY MEDICINE

## 2018-08-09 PROCEDURE — 96360 HYDRATION IV INFUSION INIT: CPT

## 2018-08-09 PROCEDURE — 96361 HYDRATE IV INFUSION ADD-ON: CPT

## 2018-08-09 PROCEDURE — 84484 ASSAY OF TROPONIN QUANT: CPT | Performed by: EMERGENCY MEDICINE

## 2018-08-09 PROCEDURE — 73610 X-RAY EXAM OF ANKLE: CPT

## 2018-08-09 PROCEDURE — 36415 COLL VENOUS BLD VENIPUNCTURE: CPT | Performed by: EMERGENCY MEDICINE

## 2018-08-09 PROCEDURE — 84100 ASSAY OF PHOSPHORUS: CPT | Performed by: EMERGENCY MEDICINE

## 2018-08-09 PROCEDURE — 83735 ASSAY OF MAGNESIUM: CPT | Performed by: EMERGENCY MEDICINE

## 2018-08-09 RX ORDER — THIAMINE MONONITRATE (VIT B1) 100 MG
TABLET ORAL
Status: COMPLETED
Start: 2018-08-09 | End: 2018-08-09

## 2018-08-09 RX ORDER — THIAMINE MONONITRATE (VIT B1) 100 MG
100 TABLET ORAL ONCE
Status: COMPLETED | OUTPATIENT
Start: 2018-08-09 | End: 2018-08-09

## 2018-08-09 RX ORDER — IBUPROFEN 600 MG/1
TABLET ORAL
Status: COMPLETED
Start: 2018-08-09 | End: 2018-08-09

## 2018-08-09 RX ORDER — LORAZEPAM 0.5 MG/1
1 TABLET ORAL ONCE
Status: COMPLETED | OUTPATIENT
Start: 2018-08-09 | End: 2018-08-09

## 2018-08-09 RX ORDER — LORAZEPAM 0.5 MG/1
1 TABLET ORAL EVERY 8 HOURS PRN
Status: DISCONTINUED | OUTPATIENT
Start: 2018-08-09 | End: 2018-08-10 | Stop reason: HOSPADM

## 2018-08-09 RX ORDER — LORAZEPAM 0.5 MG/1
TABLET ORAL
Status: COMPLETED
Start: 2018-08-09 | End: 2018-08-09

## 2018-08-09 RX ORDER — IBUPROFEN 600 MG/1
600 TABLET ORAL ONCE
Status: COMPLETED | OUTPATIENT
Start: 2018-08-09 | End: 2018-08-10

## 2018-08-09 RX ADMIN — LORAZEPAM 1 MG: 0.5 TABLET ORAL at 23:59

## 2018-08-09 RX ADMIN — LORAZEPAM 1 MG: 0.5 TABLET ORAL at 18:42

## 2018-08-09 RX ADMIN — IBUPROFEN 600 MG: 600 TABLET ORAL at 19:18

## 2018-08-09 RX ADMIN — Medication 100 MG: at 18:45

## 2018-08-09 RX ADMIN — SODIUM CHLORIDE 1000 ML: 9 INJECTION, SOLUTION INTRAVENOUS at 18:41

## 2018-08-09 NOTE — ED NOTES
Patient on phone, stating "I don't care if I die, I really dont"     Caitlyn Brunner, RN  08/09/18 [id number]

## 2018-08-09 NOTE — ED PROVIDER NOTES
History  Chief Complaint   Patient presents with    Alcohol Intoxication     pt presents to ER stating she is looking for help for her alcohol addiction, states she drinks 6-8 large glasses of alcohol daily, also states she twisted her left ankle getting into the ambulance, c/o pain so same, ice pack applied to same upon arrival    Ankle Injury     Patient is a 51-year-old female with a history of hypertension bowel obstruction in the past coming in with complaints of increasing depression and asking for assistance for alcohol abuse  Patient states that when she was walking out she twisted her left ankle  She did not fall or strike her head  Patient states she drinks greater than 4-5 beers throughout the day  Denies any liquor abuse  Denies any drug abuse  Patient is intoxicated and slurring her words  Patient denies any suicidal thoughts or ideas  She has no suicidal attempts  She has no homicidal ideations, visual or auditory hallucinations  Patient states she is just severely depressed I drink because of it  She states she has been drinking this way for several weeks and never stride to stop  He has no seizure activity from stopping with her alcohol in the past few years        History provided by:  Patient and EMS personnel   used: No    Depression   Presenting symptoms: depression    Presenting symptoms: no aggressive behavior, no agitation, no bizarre behavior, no delusions, no disorganized speech, no disorganized thought process, no hallucinations, no homicidal ideas, no paranoid behavior, no self-mutilation, no suicidal thoughts, no suicidal threats and no suicide attempt    Degree of incapacity (severity):   Moderate  Onset quality:  Gradual  Timing:  Constant  Progression:  Unchanged  Chronicity:  New  Context: alcohol use and noncompliance    Context: not drug abuse, not medication, not recent medication change and not stressful life event    Treatment compliance: Untreated  Relieved by:  Nothing  Worsened by:  Nothing  Ineffective treatments:  None tried  Associated symptoms: anxiety and irritability    Associated symptoms: no abdominal pain, no anhedonia, no appetite change, no chest pain, no decreased need for sleep, not distractible, no euphoric mood, no fatigue, no feelings of worthlessness, no headaches, no hypersomnia, no hyperventilation, no insomnia, no poor judgment, no school problems and no weight change    Risk factors: hx of mental illness    Risk factors: no family hx of mental illness and no family violence        Prior to Admission Medications   Prescriptions Last Dose Informant Patient Reported? Taking?    LORazepam (ATIVAN) 1 mg tablet   No No   Sig: Take 1 tablet (1 mg total) by mouth every 8 (eight) hours as needed for anxiety   LORazepam (ATIVAN) 1 mg tablet   No No   Sig: Take 1 tablet (1 mg total) by mouth 2 (two) times a day as needed for anxiety for up to 10 days   amLODIPine (NORVASC) 5 mg tablet   Yes No   Sig: Take 5 mg by mouth daily   citalopram (CeleXA) 20 mg tablet   Yes No   Sig: Take 20 mg by mouth daily   levothyroxine 50 mcg tablet   Yes No   Sig: Take 50 mcg by mouth daily   lisinopril 20 mg TABS 20 mg, hydrochlorothiazide 25 mg TABS 25 mg   Yes No   Sig: Take by mouth daily   metoprolol tartrate (LOPRESSOR) 50 mg tablet   Yes No   Sig: Take 1 tablet by mouth every 12 (twelve) hours   omeprazole (PriLOSEC) 20 mg delayed release capsule   No No   Sig: Take 1 capsule (20 mg total) by mouth daily   ondansetron (ZOFRAN) 4 mg tablet   No No   Sig: Take 1 tablet (4 mg total) by mouth every 6 (six) hours   pantoprazole (PROTONIX) 40 mg tablet   Yes No   Sig: Take 40 mg by mouth   sucralfate (CARAFATE) 1 g tablet   No No   Sig: Take 1 tablet (1 g total) by mouth 4 (four) times a day (before meals and at bedtime) for 14 days      Facility-Administered Medications: None       Past Medical History:   Diagnosis Date    Alcoholism ([facility] )     Bowel obstruction ([address] )     Gastritis     Hypertension        Past Surgical History:   Procedure Laterality Date    ABDOMINAL SURGERY      APPENDECTOMY      BOWEL RESECTION      CHOLECYSTECTOMY      ESOPHAGOGASTRODUODENOSCOPY N/A 5/18/2018    Procedure: ESOPHAGOGASTRODUODENOSCOPY (EGD) with bx;  Surgeon: Jd Sanchez DO;  Location: [facility]; Service: Gastroenterology    HYSTERECTOMY      KNEE SURGERY Bilateral        History reviewed  No pertinent family history  I have reviewed and agree with the history as documented  Social History   Substance Use Topics    Smoking status: Current Every Day Smoker     Packs/day: 0 50     Types: Cigarettes    Smokeless tobacco: Never Used    Alcohol use Yes      Comment: 8 large glasses of beer daily        Review of Systems   Constitutional: Positive for irritability  Negative for appetite change, diaphoresis, fatigue and fever  HENT: Negative for ear pain and sore throat  Eyes: Negative for visual disturbance  Respiratory: Negative for chest tightness and shortness of breath  Cardiovascular: Negative for chest pain and palpitations  Gastrointestinal: Negative for abdominal pain, nausea and vomiting  Genitourinary: Negative for difficulty urinating and dysuria  Musculoskeletal: Negative for back pain and neck pain  Skin: Negative for rash  Neurological: Negative for weakness and headaches  Psychiatric/Behavioral: Positive for depression  Negative for agitation, confusion, hallucinations, homicidal ideas, paranoia, self-injury and suicidal ideas  The patient is nervous/anxious  The patient does not have insomnia  All other systems reviewed and are negative  Physical Exam  Physical Exam   Constitutional: She is oriented to person, place, and time  She appears well-developed and well-nourished  No distress  Alcohol on breath   HENT:   Head: Normocephalic and atraumatic     Mouth/Throat: Oropharynx is clear and moist    Patient is maintaining airway maintaining secretions  Uvula midline without any edema  Eyes: Conjunctivae and EOM are normal  Pupils are equal, round, and reactive to light  Neck: Normal range of motion  Neck supple  Cardiovascular: Normal rate, regular rhythm, normal heart sounds and intact distal pulses  No murmur heard  Pulmonary/Chest: Effort normal and breath sounds normal  No stridor  No respiratory distress  Abdominal: Soft  Bowel sounds are normal  She exhibits no distension  There is no tenderness  Musculoskeletal: Normal range of motion  She exhibits no edema  Neurological: She is alert and oriented to person, place, and time  No cranial nerve deficit  Skin: Skin is warm  Capillary refill takes less than 2 seconds  She is not diaphoretic  Psychiatric: Her behavior is normal  Her mood appears anxious  Her speech is slurred  She exhibits a depressed mood  Patient with slurred speech  She states she is depressed  Flat affect  Alcohol breath  Nursing note and vitals reviewed        Vital Signs  ED Triage Vitals [08/09/18 1816]   Temperature Pulse Respirations Blood Pressure SpO2   97 9 °F (36 6 °C) 87 16 148/87 99 %      Temp Source Heart Rate Source Patient Position - Orthostatic VS BP Location FiO2 (%)   Temporal Monitor Lying Left arm --      Pain Score       8           Vitals:    08/09/18 1816   BP: 148/87   Pulse: 87   Patient Position - Orthostatic VS: Lying       Visual Acuity      ED Medications  Medications   LORazepam (ATIVAN) tablet 1 mg (1 mg Oral Given 8/9/18 1842)   LORazepam (ATIVAN) tablet 1 mg (not administered)   sodium chloride 0 9 % bolus 1,000 mL (1,000 mL Intravenous New Bag 8/9/18 1841)   thiamine (VITAMIN B1) tablet 100 mg (100 mg Oral Given 8/9/18 1845)   ibuprofen (MOTRIN) tablet 600 mg (600 mg Oral Given 8/9/18 1918)       Diagnostic Studies  Results Reviewed     Procedure Component Value Units Date/Time    UA w Reflex to Microscopic [[id number]]  (Normal) Collected: 08/09/18 1919    Lab Status:  Final result Specimen:  Urine from Urine, Clean Catch Updated:  08/09/18 2049     Color, UA Straw     Clarity, UA Clear     Specific Gravity, UA 1 010     pH, UA 5 0     Leukocytes, UA Negative     Nitrite, UA Negative     Protein, UA Negative mg/dl      Glucose, UA Negative mg/dl      Ketones, UA Negative mg/dl      Bilirubin, UA Negative     Blood, UA Negative     UROBILINOGEN UA Negative mg/dL     Rapid drug screen, urine [[id number]]  (Normal) Collected:  08/09/18 1919    Lab Status:  Final result Specimen:  Urine from Urine, Clean Catch Updated:  08/09/18 1945     Amph/Meth UR Negative     Barbiturate Ur Negative     Benzodiazepine Urine Negative     Cocaine Urine Negative     Methadone Urine Negative     Opiate Urine Negative     PCP Ur Negative     THC Urine Negative    Narrative:         FOR MEDICAL PURPOSES ONLY  IF CONFIRMATION NEEDED PLEASE CONTACT THE LAB WITHIN 5 DAYS      Drug Screen Cutoff Levels:  AMPHETAMINE/METHAMPHETAMINES  1000 ng/mL  BARBITURATES     200 ng/mL  BENZODIAZEPINES     200 ng/mL  COCAINE      300 ng/mL  METHADONE      300 ng/mL  OPIATES      300 ng/mL  PHENCYCLIDINE     25 ng/mL  THC       50 ng/mL    Troponin I [[id number]]  (Normal) Collected:  08/09/18 1825    Lab Status:  Final result Specimen:  Blood from Arm, Left Updated:  08/09/18 1858     Troponin I <4 64 ng/mL     Salicylate level [[id number]]  (Abnormal) Collected:  08/09/18 1825    Lab Status:  Final result Specimen:  Blood from Arm, Left Updated:  08/34/39 5012     Salicylate Lvl <6 2 (L) mg/dL     Acetaminophen level [[id number]]  (Abnormal) Collected:  08/09/18 1825    Lab Status:  Final result Specimen:  Blood from Arm, Left Updated:  08/09/18 1853     Acetaminophen Level <10 (L) ug/mL     Ethanol [[id number]]  (Abnormal) Collected:  08/09/18 1825    Lab Status:  Final result Specimen:  Blood from Arm, Left Updated:  08/09/18 1852     Ethanol Lvl 358 (H) mg/dL     Phosphorus [[id number]]  (Normal) Collected:  08/09/18 1825    Lab Status:  Final result Specimen:  Blood from Arm, Left Updated:  08/09/18 1852     Phosphorus 3 9 mg/dL     Magnesium [[id number]]  (Normal) Collected:  08/09/18 1825    Lab Status:  Final result Specimen:  Blood from Arm, Left Updated:  08/09/18 1852     Magnesium 2 1 mg/dL     Lipase [[id number]]  (Normal) Collected:  08/09/18 1825    Lab Status:  Final result Specimen:  Blood from Arm, Left Updated:  08/09/18 1852     Lipase 132 u/L     Comprehensive metabolic panel [[id number]]  (Abnormal) Collected:  08/09/18 1825    Lab Status:  Final result Specimen:  Blood from Arm, Left Updated:  08/09/18 0117     Sodium 135 (L) mmol/L      Potassium 3 7 mmol/L      Chloride 97 mmol/L      CO2 26 mmol/L      Anion Gap 12 mmol/L      BUN 8 mg/dL      Creatinine 0 62 mg/dL      Glucose 97 mg/dL      Calcium 8 9 mg/dL      AST 61 (H) U/L      ALT 66 (H) U/L      Alkaline Phosphatase 108 U/L      Total Protein 7 9 g/dL      Albumin 4 3 g/dL      Total Bilirubin 0 70 mg/dL      eGFR 105 ml/min/1 73sq m     Narrative:         National Kidney Disease Education Program recommendations are as follows:  GFR calculation is accurate only with a steady state creatinine  Chronic Kidney disease less than 60 ml/min/1 73 sq  meters  Kidney failure less than 15 ml/min/1 73 sq  meters      CBC and differential [[id number]]  (Abnormal) Collected:  08/09/18 1825    Lab Status:  Final result Specimen:  Blood from Arm, Left Updated:  08/09/18 1837     WBC 8 10 Thousand/uL      RBC 4 64 Million/uL      Hemoglobin 15 7 g/dL      Hematocrit 44 9 %      MCV 97 fL      MCH 33 7 pg      MCHC 34 9 g/dL      RDW 13 2 %      MPV 7 4 (L) fL      Platelets 963 Thousands/uL      Neutrophils Relative 47 %      Lymphocytes Relative 42 %      Monocytes Relative 9 %      Eosinophils Relative 2 %      Basophils Relative 0 %      Neutrophils Absolute 3 80 Thousands/µL      Lymphocytes Absolute 3 50 Thousands/µL      Monocytes Absolute 0 70 Thousand/µL      Eosinophils Absolute 0 10 Thousand/µL      Basophils Absolute 0 00 Thousands/µL                  XR ankle 3+ views LEFT    (Results Pending)              Procedures  Procedures       Phone Contacts  ED Phone Contact    ED Course                               MDM  Number of Diagnoses or Management Options  Alcohol abuse:   Alcohol intoxication ([address] ):   Depression:   Left ankle sprain:   Diagnosis management comments:   Patient is a 50year old female coming in with increasing depression and alcohol abuse  This time patient is clearly intoxicated and complaining of left ankle pain  Will obtain psychiatric labs as well as x-ray of the ankle  Once medically cleared and sober will have crisis in for evaluation  Will also give Ativan p r n  to prevent withdrawals    6:56 PM  Patient with ETOH of greater than 350  Patient will not be sober until greater than 12 hours from now  Will continue with Ativan as needed  Patient made a statement to staff that she was suicidal     7:08 PM  Patient's x-ray on my read reveals no acute fracture  Will give Motrin for pain as well as put in air cast     EKG INTERPRETATION at 7:11 p m  RHYTHM:  Normal sinus rhythm at 60 beats per minute  AXIS:  Normal axis  INTERVALS:  1st degree AV block  QRS COMPLEX:  QRS measured at 78 milliseconds  ST SEGMENT:  Nonspecific ST segment changes  Artifact present  QT INTERVAL:  QTC measured at 450 milliseconds  COMPARED WITH PRIOR   Jocelyn Logan Interpretation by Erika Enamorado DO    10:12 PM  Patient sleeping in bed  11:34 PM  Patient woke up  Told crisis she is here for her alcohol abuse and denies SI at this time  Will have crisis have HOST come evaluate patient in AM  Signed out to Dr Stefania Wong  Portions of the record may have been created with voice recognition software  Occasional wrong word or "sound a like" substitutions may have occurred due to the inherent limitations of voice recognition software   Read the chart carefully and recognize, using context, where substitutions have occurred  Amount and/or Complexity of Data Reviewed  Clinical lab tests: ordered and reviewed  Tests in the radiology section of CPT®: ordered and reviewed  Tests in the medicine section of CPT®: ordered and reviewed  Independent visualization of images, tracings, or specimens: yes (No acute fracture  Good alignment )      CritCare Time    Disposition  Final diagnoses:   Alcohol intoxication ([facility] )   Alcohol abuse   Depression   Left ankle sprain     Time reflects when diagnosis was documented in both MDM as applicable and the Disposition within this note     Time User Action Codes Description Comment    8/9/2018  6:18 PM Floydene Laurita Add [F10 929] Alcohol intoxication ([facility] )     8/9/2018  6:18 PM Floydene Laurita Add [F10 10] Alcohol abuse     8/9/2018  6:18 PM Floydene Laurita Add [F32 9] Depression     8/9/2018  7:08 PM Maxim Parra Add [S93 402A] Left ankle sprain       ED Disposition     None      Follow-up Information    None         Patient's Medications   Discharge Prescriptions    No medications on file     No discharge procedures on file      ED Provider  Electronically Signed by           Maria Dolores Naidu DO  08/09/18 3273

## 2018-08-09 NOTE — ED NOTES
Patient ambulated with steady gait to bathroom to change into paper scrubs and provide urine specimen   Will lock up all valuables and belongings once collected     Jose Miguel Iglesias RN  08/09/18 5399

## 2018-08-09 NOTE — ED NOTES
Patient c/o left ankle pain, states she fell when getting into ambulance  Also reports she wants help for ETOH, stating she drinks 6-8 large alcoholic beverages a day, wants to get sober  Reports SI with no plan to this RN  Denies hx of withdrawal seizures  Denies [facility]   Last ETOH drink immediately before arrival in ER     Frederick Kulkarni, RN  08/09/18 [address] [facility] [place], RN  08/09/18 1041

## 2018-08-09 NOTE — ED NOTES
Patient ambulated out of room to nurse's station yelling at this RN, "my ankle hurts  What, I'm a bitch now?  My ankle hurts so much that why I have to walk on it"     Cj Ferro, MARK ANTHONY  08/09/18 6766

## 2018-08-09 NOTE — ED NOTES
Patient talking on cellphone, "I need something for pain   My ankle's killing me"     Swetha Luis, MARK ANTHONY  08/09/18 0995

## 2018-08-09 NOTE — ED NOTES
Patient ambulating out of room again with steady gait, states "I want to be transferred to [address] [place] [address]  If I want to leave, you can't stop me"  Informed patient that she cannot leave until sober and evaluated by crisis due to stating SI, informed this RN will call [employer] if she left dept        Cristin Alexander RN  08/09/18 6364

## 2018-08-10 VITALS
SYSTOLIC BLOOD PRESSURE: 156 MMHG | RESPIRATION RATE: 18 BRPM | OXYGEN SATURATION: 99 % | HEART RATE: 76 BPM | TEMPERATURE: 97.5 F | WEIGHT: 150 LBS | BODY MASS INDEX: 26.57 KG/M2 | DIASTOLIC BLOOD PRESSURE: 98 MMHG

## 2018-08-10 LAB
ATRIAL RATE: 68 BPM
P AXIS: 54 DEGREES
PR INTERVAL: 212 MS
QRS AXIS: 11 DEGREES
QRSD INTERVAL: 78 MS
QT INTERVAL: 424 MS
QTC INTERVAL: 450 MS
T WAVE AXIS: 15 DEGREES
VENTRICULAR RATE: 68 BPM

## 2018-08-10 PROCEDURE — 93010 ELECTROCARDIOGRAM REPORT: CPT | Performed by: INTERNAL MEDICINE

## 2018-08-10 PROCEDURE — 99285 EMERGENCY DEPT VISIT HI MDM: CPT

## 2018-08-10 RX ORDER — ACETAMINOPHEN 325 MG/1
TABLET ORAL
Status: COMPLETED
Start: 2018-08-10 | End: 2018-08-10

## 2018-08-10 RX ORDER — LORAZEPAM 0.5 MG/1
TABLET ORAL
Status: COMPLETED
Start: 2018-08-10 | End: 2018-08-10

## 2018-08-10 RX ORDER — METOPROLOL TARTRATE 50 MG/1
TABLET, FILM COATED ORAL
Status: COMPLETED
Start: 2018-08-10 | End: 2018-08-10

## 2018-08-10 RX ORDER — LORAZEPAM 0.5 MG/1
2 TABLET ORAL ONCE
Status: COMPLETED | OUTPATIENT
Start: 2018-08-10 | End: 2018-08-10

## 2018-08-10 RX ORDER — IBUPROFEN 600 MG/1
TABLET ORAL
Status: COMPLETED
Start: 2018-08-10 | End: 2018-08-10

## 2018-08-10 RX ORDER — LORAZEPAM 0.5 MG/1
1 TABLET ORAL ONCE
Status: DISCONTINUED | OUTPATIENT
Start: 2018-08-10 | End: 2018-08-10 | Stop reason: HOSPADM

## 2018-08-10 RX ORDER — IBUPROFEN 600 MG/1
TABLET ORAL
Status: DISCONTINUED
Start: 2018-08-10 | End: 2018-08-10 | Stop reason: HOSPADM

## 2018-08-10 RX ORDER — LORAZEPAM 0.5 MG/1
TABLET ORAL
Status: DISPENSED
Start: 2018-08-10 | End: 2018-08-10

## 2018-08-10 RX ORDER — IBUPROFEN 600 MG/1
600 TABLET ORAL ONCE
Status: DISCONTINUED | OUTPATIENT
Start: 2018-08-10 | End: 2018-08-10 | Stop reason: HOSPADM

## 2018-08-10 RX ORDER — ACETAMINOPHEN 325 MG/1
650 TABLET ORAL ONCE
Status: COMPLETED | OUTPATIENT
Start: 2018-08-10 | End: 2018-08-10

## 2018-08-10 RX ORDER — LORAZEPAM 0.5 MG/1
1 TABLET ORAL ONCE
Status: COMPLETED | OUTPATIENT
Start: 2018-08-10 | End: 2018-08-10

## 2018-08-10 RX ORDER — LISINOPRIL 10 MG/1
TABLET ORAL
Status: DISCONTINUED
Start: 2018-08-10 | End: 2018-08-10 | Stop reason: HOSPADM

## 2018-08-10 RX ORDER — LISINOPRIL 10 MG/1
20 TABLET ORAL ONCE
Status: COMPLETED | OUTPATIENT
Start: 2018-08-10 | End: 2018-08-10

## 2018-08-10 RX ORDER — METOPROLOL TARTRATE 50 MG/1
50 TABLET, FILM COATED ORAL ONCE
Status: COMPLETED | OUTPATIENT
Start: 2018-08-10 | End: 2018-08-10

## 2018-08-10 RX ADMIN — ACETAMINOPHEN 650 MG: 325 TABLET ORAL at 08:06

## 2018-08-10 RX ADMIN — IBUPROFEN 600 MG: 600 TABLET ORAL at 05:12

## 2018-08-10 RX ADMIN — LISINOPRIL 20 MG: 10 TABLET ORAL at 05:10

## 2018-08-10 RX ADMIN — LORAZEPAM 1 MG: 0.5 TABLET ORAL at 08:10

## 2018-08-10 RX ADMIN — LORAZEPAM 0.5 MG: 0.5 TABLET ORAL at 05:26

## 2018-08-10 RX ADMIN — METOPROLOL TARTRATE 50 MG: 50 TABLET ORAL at 05:11

## 2018-08-10 RX ADMIN — METOPROLOL TARTRATE 50 MG: 50 TABLET, FILM COATED ORAL at 05:11

## 2018-08-10 RX ADMIN — LORAZEPAM 2 MG: 0.5 TABLET ORAL at 11:26

## 2018-08-10 RX ADMIN — LORAZEPAM 1 MG: 0.5 TABLET ORAL at 05:19

## 2018-08-10 NOTE — ED CARE HANDOFF
7:45 AM  Patient signed out to me this is a 74-year-old female with a history of alcoholism and recent injury to left ankle with an air cast on it noted  Here in the emergency department she is medically clear at this point time showing some mild signs with withdrawal with noted tremors  Dose of Ativan given at 5:00 a m  will repeat another dose of Ativan at this point time awaiting drug and alcohol counseling program to come and evaluate the patient for possible rehab  Patient wants to stay and get evaluated for rehab she is not suicidal not homicidal no indications of psychiatric commitment at this point time       [facility] , DO  08/10/18 1397

## 2018-08-10 NOTE — ED NOTES
Patient voices understanding of transferring at 1400; cell phone obtained from security for phone numbers for informing family       Yane Barraza RN  08/10/18 0789

## 2018-08-10 NOTE — DISCHARGE INSTRUCTIONS
Abuse of Alcohol   AMBULATORY CARE:   Alcohol abuse   · is unhealthy drinking behavior  You may drink too much at one time once a week, or continue to drink too much daily  You continue to drink even though it causes problems  The problems can be alcohol related legal problems or problems with work or family  · If you drink too much at one time, you are binge drinking  Binge drinking is when you have a large amount of alcohol in a short time  Your blood alcohol concentrations (MYLES) goes above 0 08 g/dLlevel during binge drinking  For men, this usually happens with more than 4 drinks in 2 hours  For women, it is more than 3 drinks in 2 hours  A drink is 12 ounces of beer, 4 ounces of wine, or 1½ ounces of liquor  Common signs and symptoms of alcohol abuse include:  Each person that abuses alcohol may have different symptoms  The following are common signs and symptoms of alcohol abuse:  · Loss of interest in activities, work, and school    · Decreased interest in family and friends    · Depression    · Constant thoughts about drinking    · Not able to control the amount you drink    · Restlessness, or erratic and violent behavior  Call 911 for any of the following:   · You have sudden chest pain or trouble breathing  · You have a seizure or have shaking or trembling  · You feel like you could harm yourself or others  · You were in an accident because of alcohol  Seek care immediately if:   · You have hallucinations (you see or hear things that are not real)  · You cannot stop vomiting or you vomit blood  Contact your healthcare provider if:   · You need help to stop drinking alcohol  · You have questions or concerns about your condition or care    Long-term effects of alcohol abuse:   · Blackouts    · Memory loss    · Dementia    · Liver disease    · Thiamine (vitamin B1) deficiency  Treatment for alcohol abuse  may include the following:  · Detoxification (detox) and withdrawal  is a program that helps you to safely get alcohol out of your body  Detox can also help get rid of the physical need to drink  Healthcare providers monitor the physical symptoms of withdrawal  They may give you medicines to help decrease nausea, dehydration, and seizures  Healthcare providers will also monitor your blood pressure, heart and breathing rates, and your temperature  Symptoms of anxiety, depression, and suicidal thoughts are also monitored and managed during detox  Healthcare providers may give you medicines for these symptoms and therapy sessions will be available to you  Detox is usually done at a detox center or in a hospital  Healthcare providers do not recommend that you try to detox at home or by yourself  Withdrawal symptoms may become life threatening  The center can help you find 12 step programs or an individual therapist to help with emotional support after detox  · Inpatient and outpatient treatment  focus on your personal needs to help you stop drinking  Treatment helps you understand the reasons you abuse alcohol  Counselors and therapists provide you with support and help you find ways to cope instead of drinking  You may need inpatient treatment to provide a controlled environment  You may need outpatient treatment after your inpatient treatment is complete  · Alcohol aversion therapy  takes away the desire to drink by causing a negative reaction when you drink  Healthcare providers may give you medicines that cause nausea and vomiting when you drink alcohol  They may instead give you a medicine that decreases your urge to drink alcohol  These medicines are used to help you stop drinking or reduce the amount you drink  They can also help you avoid relapse  Risks of alcohol abuse:  Alcohol abuse increases your risk for gastrointestinal cancers, brain damage and problems with your immune system  It also increases your risk for heart, kidney, and lung damage   The risk of stroke increases with alcohol abuse  If you are pregnant and drink alcohol, you and your baby are at risk for serious health problems  Avoid alcohol:  You should stop drinking entirely  Alcohol can damage your brain, heart, and liver  It also increases your risk for injury, high blood pressure, and certain types of cancer  Alcohol is dangerous when you combine it with certain medicines  Do not drive if you drink alcohol:  Make sure someone who has not been drinking can help you get home  Get support:  Most people need support to stop drinking alcohol  Mental health providers, support groups, rehabilitation centers, and your healthcare provider can provide support  For more information:   · Alcoholics Anonymous  Web Address: http://[employer]/  · Substance Abuse and [address] [place]  Web Address: https://[employer]/  Follow up with your healthcare provider as directed:  Write down your questions so you remember to ask them during your visits  © 2017 [address] [employer] Information is for End User's use only and may not be sold, redistributed or otherwise used for commercial purposes  All illustrations and images included in CareNotes® are the copyrighted property of A D A M , Inc  or Dennis Hobson  The above information is an  only  It is not intended as medical advice for individual conditions or treatments  Talk to your doctor, nurse or pharmacist before following any medical regimen to see if it is safe and effective for you

## 2018-08-10 NOTE — ED NOTES
Patient is resting comfortably  Resp  Deep and even  Garage door closed  Non essential equipment out of room  Patient in view of staff       Shante Younger RN  08/10/18 5098

## 2018-08-10 NOTE — ED NOTES
Patient is sleeping with adequate respirations and self position changes are noted       Roderick Bradley RN  08/10/18 6934

## 2018-08-10 NOTE — ED NOTES
Please note that motrin 600mg, and ativan 1mg was given according to protocol as ordered but meds continue to pop up on narrator screen - even though they are charted on the patient summary side  Cannot open the MAR   Meds did disappear at one point - then returned to narrator screen       Lana Rondon RN  08/10/18 5797

## 2018-08-10 NOTE — ED NOTES
[facility] representative will be coming to the ED to assess patient  Patient requesting treatment for alcohol abuse

## 2018-08-10 NOTE — ED NOTES
Patient awake and ambulated to the bathroom  Patient had denied feeling symptoms of withdrawal - had asked for her BP medication and something for pain for her left foot - Dr Carlos Coronado was made aware and patient was medicated with motrin and her BP meds of lopressor and lisinopril  When patient went to  the water cup - hands noted to be tremororring  Dr Carlos Coronado was made aware of same and patient was medicated with ativan 1 mg as ordered  Patient is awake and alert and oriented and cooperative with care measures  Vital signs as charted   Patient settled back down to rest      Timoteo Hickman RN  08/10/18 9509

## 2018-08-10 NOTE — ED NOTES
Patient out to desk frequently - states that she does not want to stay in this hospital because she used to work here and is embarrassed  Crisis worker and doctor states that patient is not suicidal and can leave if she wants - patient states that she wants help with her alcoholism for her children and it's not that she wants to leave she just doesn't want to be in this facility  Patient was medicated with ativan 1mg as aide in sleep  Patient does deny suicidal ideations - wants help with her alcohol addiction       Jolena Kayser, RN  08/10/18 [address] [place] [address]) Camilo Mcpherson RN  08/10/18 0233

## 2018-08-10 NOTE — ED NOTES
Pt requested to speak with crisis worker  Pt requested water, which was given to her  Pt is cooperative and is pleasant towards staff  Pt denies SI/HI and would like to get help for her alcohol abuse  Pt is aware that an assessment will be completed when she is fully sober, which she is in agreement with  Pt does not have additional requests at this time and is laying quietly in her bed

## 2018-08-16 ENCOUNTER — HOSPITAL ENCOUNTER (EMERGENCY)
Facility: HOSPITAL | Age: 53
Discharge: HOME | End: 2018-08-16
Attending: EMERGENCY MEDICINE
Payer: MEDICARE

## 2018-08-16 VITALS
SYSTOLIC BLOOD PRESSURE: 126 MMHG | OXYGEN SATURATION: 96 % | HEART RATE: 96 BPM | BODY MASS INDEX: 26.57 KG/M2 | WEIGHT: 150 LBS | RESPIRATION RATE: 18 BRPM | TEMPERATURE: 98.4 F | DIASTOLIC BLOOD PRESSURE: 70 MMHG

## 2018-08-16 DIAGNOSIS — S93.492A SPRAIN OF ANTERIOR TALOFIBULAR LIGAMENT OF LEFT ANKLE, INITIAL ENCOUNTER: Primary | ICD-10-CM

## 2018-08-16 PROCEDURE — 96372 THER/PROPH/DIAG INJ SC/IM: CPT

## 2018-08-16 PROCEDURE — 99283 EMERGENCY DEPT VISIT LOW MDM: CPT

## 2018-08-16 RX ORDER — KETOROLAC TROMETHAMINE 30 MG/ML
15 INJECTION, SOLUTION INTRAMUSCULAR; INTRAVENOUS ONCE
Status: COMPLETED | OUTPATIENT
Start: 2018-08-16 | End: 2018-08-16

## 2018-08-16 RX ORDER — ACETAMINOPHEN 325 MG/1
975 TABLET ORAL ONCE
Status: COMPLETED | OUTPATIENT
Start: 2018-08-16 | End: 2018-08-16

## 2018-08-16 RX ADMIN — KETOROLAC TROMETHAMINE 15 MG: 30 INJECTION, SOLUTION INTRAMUSCULAR at 18:55

## 2018-08-16 RX ADMIN — ACETAMINOPHEN 975 MG: 325 TABLET, FILM COATED ORAL at 18:53

## 2018-08-16 NOTE — DISCHARGE INSTRUCTIONS
Ankle Sprain   WHAT YOU NEED TO KNOW:   An ankle sprain happens when 1 or more ligaments in your ankle joint stretch or tear  Ligaments are tough tissues that connect bones  Ligaments support your joints and keep your bones in place  DISCHARGE INSTRUCTIONS:   Return to the emergency department if:   · You have severe pain in your ankle  · Your foot or toes are cold or numb  · Your ankle becomes more weak or unstable (wobbly)  · You are unable to put any weight on your ankle or foot  · Your swelling has increased or returned  Contact your healthcare provider if:   · Your pain does not go away, even after treatment  · You have questions or concerns about your condition or care  Medicines: You may need any of the following:  · NSAIDs , such as ibuprofen, help decrease swelling, pain, and fever  This medicine is available with or without a doctor's order  NSAIDs can cause stomach bleeding or kidney problems in certain people  If you take blood thinner medicine, always ask your healthcare provider if NSAIDs are safe for you  Always read the medicine label and follow directions  · Acetaminophen  decreases pain  It is available without a doctor's order  Ask how much to take and how often to take it  Follow directions  Acetaminophen can cause liver damage if not taken correctly  · Prescription pain medicine  may be given  Ask how to take this medicine safely  · Take your medicine as directed  Contact your healthcare provider if you think your medicine is not helping or if you have side effects  Tell him or her if you are allergic to any medicine  Keep a list of the medicines, vitamins, and herbs you take  Include the amounts, and when and why you take them  Bring the list or the pill bottles to follow-up visits  Carry your medicine list with you in case of an emergency    Self care:   · Use support devices,  such as a brace, cast, or splint, may be needed to limit your movement and protect your joint  You may need to use crutches to decrease your pain as you move around  · Go to physical therapy as directed  A physical therapist teaches you exercises to help improve movement and strength, and to decrease pain  · Rest  your ankle so that it can heal  Return to normal activities as directed  · Apply ice on your ankle for 15 to 20 minutes every hour or as directed  Use an ice pack, or put crushed ice in a plastic bag  Cover it with a towel  Ice helps prevent tissue damage and decreases swelling and pain  · Compress  your ankle  Ask if you should wrap an elastic bandage around your injured ligament  An elastic bandage provides support and helps decrease swelling and movement so your joint can heal  Wear as long as directed  · Elevate  your ankle above the level of your heart as often as you can  This will help decrease swelling and pain  Prop your ankle on pillows or blankets to keep it elevated comfortably  Prevent another ankle sprain:   · Let your ankle heal   Find out how long your ligament needs to heal  Do not do any physical activity until your healthcare provider says it is okay  If you start activity too soon, you may develop a more serious injury  · Always warm up and stretch  before you exercise or play sports  · Use the right equipment  Always wear shoes that fit well and are made for the activity that you are doing  You may also need ankle supports, elbow and knee pads, or braces  Follow up with your healthcare provider as directed:  Write down your questions so you remember to ask them during your visits  © 2017 [address] [employer] Information is for End User's use only and may not be sold, redistributed or otherwise used for commercial purposes  All illustrations and images included in CareNotes® are the copyrighted property of [employer]  or [employer] Hobson  The above information is an  only   It is not intended as medical advice for individual conditions or treatments  Talk to your doctor, nurse or pharmacist before following any medical regimen to see if it is safe and effective for you

## 2018-08-16 NOTE — ED PROVIDER NOTES
History  Chief Complaint   Patient presents with    Ankle Injury     Pt states 2 weeks ago she was diagnosed with an ankle sprain after an injury  Pt states swelling and pain has not improved  The patient is a 49 y/o female presenting to the ED reporting left ankle pain  She states she was seen in the ED at [address] [place] [address] on 8/9/18 and was diagnosed with an ankle sprain after the x-ray revealed a normal image  She reports continued bruising and swelling of the left outer ankle  She denies any re-injury or trauma  She states she wore the ankle brace that was given for a few days, but didn't feel it was helping, so she stopped using it  She is denying any numbness or weakness in the foot  She reports some pain with ambulation  Pt is stating, "I just want to get rid of this pain " She reports no fevers or chills  She is otherwise reporting no other complaints at this time  Prior to Admission Medications   Prescriptions Last Dose Informant Patient Reported? Taking?    LORazepam (ATIVAN) 1 mg tablet   No No   Sig: Take 1 tablet (1 mg total) by mouth every 8 (eight) hours as needed for anxiety   LORazepam (ATIVAN) 1 mg tablet   No No   Sig: Take 1 tablet (1 mg total) by mouth 2 (two) times a day as needed for anxiety for up to 10 days   amLODIPine (NORVASC) 5 mg tablet   Yes No   Sig: Take 5 mg by mouth daily   citalopram (CeleXA) 20 mg tablet   Yes No   Sig: Take 20 mg by mouth daily   levothyroxine 50 mcg tablet   Yes No   Sig: Take 50 mcg by mouth daily   lisinopril 20 mg TABS 20 mg, hydrochlorothiazide 25 mg TABS 25 mg   Yes No   Sig: Take by mouth daily   metoprolol tartrate (LOPRESSOR) 50 mg tablet   Yes No   Sig: Take 1 tablet by mouth every 12 (twelve) hours   omeprazole (PriLOSEC) 20 mg delayed release capsule   No No   Sig: Take 1 capsule (20 mg total) by mouth daily   ondansetron (ZOFRAN) 4 mg tablet   No No   Sig: Take 1 tablet (4 mg total) by mouth every 6 (six) hours pantoprazole (PROTONIX) 40 mg tablet   Yes No   Sig: Take 40 mg by mouth   sucralfate (CARAFATE) 1 g tablet   No No   Sig: Take 1 tablet (1 g total) by mouth 4 (four) times a day (before meals and at bedtime) for 14 days      Facility-Administered Medications: None       Past Medical History:   Diagnosis Date    [facility]ism ([facility] )     [facility] obstruction ([facility] )     Gastritis     Hypertension        Past Surgical History:   Procedure Laterality Date    ABDOMINAL SURGERY      APPENDECTOMY      BOWEL RESECTION      CHOLECYSTECTOMY      ESOPHAGOGASTRODUODENOSCOPY N/A 5/18/2018    Procedure: ESOPHAGOGASTRODUODENOSCOPY (EGD) with bx;  Surgeon: Amy Jarrell DO;  Location: [facility]; Service: Gastroenterology    HYSTERECTOMY      KNEE SURGERY Bilateral        History reviewed  No pertinent family history  I have reviewed and agree with the history as documented  Social History   Substance Use Topics    Smoking status: Current Every Day Smoker     Packs/day: 0 50     Types: Cigarettes    Smokeless tobacco: Never Used    Alcohol use Yes      Comment: 8 large glasses of beer daily        Review of Systems   Constitutional: Negative for chills, fatigue and fever  Respiratory: Negative for shortness of breath  Cardiovascular: Negative for palpitations  Musculoskeletal: Positive for arthralgias and joint swelling  Negative for back pain, myalgias, neck pain and neck stiffness  Skin: Negative for rash and wound  Neurological: Negative for weakness and numbness  Hematological: Negative for adenopathy  Does not bruise/bleed easily  All other systems reviewed and are negative  Physical Exam  Physical Exam   Constitutional: She is oriented to person, place, and time  She appears well-developed and well-nourished  No distress  Eyes: Conjunctivae are normal    Neck: Normal range of motion  Neck supple  Cardiovascular: Normal rate and regular rhythm      Pulmonary/Chest: Effort normal  No respiratory distress  Musculoskeletal:        Left hip: Normal         Left knee: Normal         Left ankle: She exhibits swelling, ecchymosis and deformity  She exhibits normal range of motion  Tenderness  Lateral malleolus and AITFL tenderness found  No medial malleolus, no CF ligament, no posterior TFL, no head of 5th metatarsal and no proximal fibula tenderness found  Achilles tendon exhibits no pain and no defect  Lumbar back: Normal         Feet:    Neurological: She is alert and oriented to person, place, and time  Skin: Skin is warm and dry  Psychiatric: She has a normal mood and affect  Nursing note and vitals reviewed  Vital Signs  ED Triage Vitals [08/16/18 1807]   Temperature Pulse Respirations Blood Pressure SpO2   98 4 °F (36 9 °C) (!) 122 18 126/70 96 %      Temp Source Heart Rate Source Patient Position - Orthostatic VS BP Location FiO2 (%)   Oral Monitor Sitting Right arm --      Pain Score       Worst Possible Pain           Vitals:    08/16/18 1807 08/16/18 1948   BP: 126/70    Pulse: (!) 122 96   Patient Position - Orthostatic VS: Sitting        Visual Acuity      ED Medications  Medications   ketorolac (TORADOL) injection 15 mg (15 mg Intramuscular Given 8/16/18 1855)   acetaminophen (TYLENOL) tablet 975 mg (975 mg Oral Given 8/16/18 1853)       Diagnostic Studies  Results Reviewed     None                 No orders to display              Procedures  Procedures       Phone Contacts  ED Phone Contact    ED Course                               MDM  Number of Diagnoses or Management Options  Diagnosis management comments: Patient is returning for no improvement of left ankle sprain/strain  She is requesting an MRI  She was advised that we could not do that today and will be referred to orthopedics  She was given Toradol IM and PO Tylenol and given an Ace wrap and ice application with instructions to follow up with ortho   She was given a prescription for diclofenac for pain and advised to RICE the left leg  She was instructed to return to the ED for any worsening or concerning symptoms  She was accepting of this plan  Amount and/or Complexity of Data Reviewed  Tests in the radiology section of CPT®: reviewed  Review and summarize past medical records: yes    Patient Progress  Patient progress: stable    CritCare Time    Disposition  Final diagnoses:   Sprain of anterior talofibular ligament of left ankle, initial encounter     Time reflects when diagnosis was documented in both MDM as applicable and the Disposition within this note     Time User Action Codes Description Comment    8/16/2018  7:36 PM Ramana Elder Add [L31 050T] Sprain of anterior talofibular ligament of left ankle, initial encounter       ED Disposition     ED Disposition Condition Comment    Discharge  [place] discharge to home/self care      Condition at discharge: Stable        Follow-up Information     Follow up With Specialties Details Why Contact Info Additional [address] [facility] Schedule an appointment as soon as possible for a visit  [facility] [address]  [facility]  Nurse Practitioner  As needed [address] [facility] [address]  [address] Emergency Department Emergency Medicine  As needed, If symptoms worsen [address] [facility] ED, [address] [place], [place], [address]          Discharge Medication List as of 8/16/2018  7:38 PM      START taking these medications    Details   diclofenac sodium (VOLTAREN) 50 mg EC tablet Take 1 tablet (50 mg total) by mouth 2 (two) times a day for 7 days, Starting Thu 8/16/2018, Until Thu 8/23/2018, Print         CONTINUE these medications which have NOT CHANGED    Details   amLODIPine (NORVASC) 5 mg tablet Take 5 mg by mouth daily, Historical Med      citalopram (CeleXA) 20 mg tablet Take 20 mg by mouth daily, Historical Med      levothyroxine 50 mcg tablet Take 50 mcg by mouth daily, Historical Med      lisinopril 20 mg TABS 20 mg, hydrochlorothiazide 25 mg TABS 25 mg Take by mouth daily, Historical Med      LORazepam (ATIVAN) 1 mg tablet Take 1 tablet (1 mg total) by mouth every 8 (eight) hours as needed for anxiety, Starting Tue 5/22/2018, Print      metoprolol tartrate (LOPRESSOR) 50 mg tablet Take 1 tablet by mouth every 12 (twelve) hours, Historical Med      omeprazole (PriLOSEC) 20 mg delayed release capsule Take 1 capsule (20 mg total) by mouth daily, Starting Tue 6/19/2018, Print      ondansetron (ZOFRAN) 4 mg tablet Take 1 tablet (4 mg total) by mouth every 6 (six) hours, Starting Tue 6/19/2018, Print      pantoprazole (PROTONIX) 40 mg tablet Take 40 mg by mouth, Starting Thu 12/14/2017, Historical Med      sucralfate (CARAFATE) 1 g tablet Take 1 tablet (1 g total) by mouth 4 (four) times a day (before meals and at bedtime) for 14 days, Starting Sun 4/22/2018, Until Tue 5/22/2018, Print           No discharge procedures on file      ED Provider  Electronically Signed by           Darcie Spangler  08/16/18 0084

## 2019-05-07 ENCOUNTER — APPOINTMENT (EMERGENCY)
Dept: RADIOLOGY | Facility: HOSPITAL | Age: 54
End: 2019-05-07
Payer: MEDICARE

## 2019-05-07 ENCOUNTER — HOSPITAL ENCOUNTER (OUTPATIENT)
Facility: HOSPITAL | Age: 54
Setting detail: OBSERVATION
Discharge: HOME | End: 2019-05-08
Attending: EMERGENCY MEDICINE | Admitting: HOSPITALIST
Payer: MEDICARE

## 2019-05-07 ENCOUNTER — APPOINTMENT (OUTPATIENT)
Dept: NON INVASIVE DIAGNOSTICS | Facility: HOSPITAL | Age: 54
End: 2019-05-07
Payer: MEDICARE

## 2019-05-07 DIAGNOSIS — R10.9 ACUTE ABDOMINAL PAIN: ICD-10-CM

## 2019-05-07 DIAGNOSIS — J40 BRONCHITIS: ICD-10-CM

## 2019-05-07 DIAGNOSIS — K29.70 GASTRITIS WITHOUT BLEEDING, UNSPECIFIED CHRONICITY, UNSPECIFIED GASTRITIS TYPE: ICD-10-CM

## 2019-05-07 DIAGNOSIS — R07.9 CHEST PAIN, UNSPECIFIED TYPE: Primary | ICD-10-CM

## 2019-05-07 DIAGNOSIS — Z72.0 TOBACCO USE: ICD-10-CM

## 2019-05-07 DIAGNOSIS — F10.920 ALCOHOLIC INTOXICATION WITHOUT COMPLICATION (HCC): ICD-10-CM

## 2019-05-07 DIAGNOSIS — F41.9 ANXIETY: ICD-10-CM

## 2019-05-07 PROBLEM — F10.10 ALCOHOL ABUSE: Status: ACTIVE | Noted: 2019-05-07

## 2019-05-07 LAB
ALBUMIN SERPL BCP-MCNC: 4.5 G/DL (ref 3.5–5)
ALP SERPL-CCNC: 123 U/L (ref 46–116)
ALT SERPL W P-5'-P-CCNC: 57 U/L (ref 12–78)
AMPHETAMINES SERPL QL SCN: NEGATIVE
ANION GAP SERPL CALCULATED.3IONS-SCNC: 21 MMOL/L (ref 4–13)
APTT PPP: 27 SECONDS (ref 26–38)
AST SERPL W P-5'-P-CCNC: 59 U/L (ref 5–45)
ATRIAL RATE: 101 BPM
ATRIAL RATE: 130 BPM
BACTERIA UR QL AUTO: ABNORMAL /HPF
BARBITURATES UR QL: NEGATIVE
BASOPHILS # BLD AUTO: 0.04 THOUSANDS/ΜL (ref 0–0.1)
BASOPHILS NFR BLD AUTO: 0 % (ref 0–1)
BENZODIAZ UR QL: NEGATIVE
BILIRUB SERPL-MCNC: 0.4 MG/DL (ref 0.2–1)
BILIRUB UR QL STRIP: NEGATIVE
BUN SERPL-MCNC: 6 MG/DL (ref 5–25)
CALCIUM SERPL-MCNC: 9.2 MG/DL (ref 8.3–10.1)
CHLORIDE SERPL-SCNC: 99 MMOL/L (ref 100–108)
CK MB SERPL-MCNC: 1.9 NG/ML (ref 0–5)
CK MB SERPL-MCNC: <1 % (ref 0–2.5)
CK SERPL-CCNC: 293 U/L (ref 26–192)
CLARITY UR: CLEAR
CO2 SERPL-SCNC: 21 MMOL/L (ref 21–32)
COCAINE UR QL: NEGATIVE
COLOR UR: YELLOW
CREAT SERPL-MCNC: 0.84 MG/DL (ref 0.6–1.3)
EOSINOPHIL # BLD AUTO: 0.04 THOUSAND/ΜL (ref 0–0.61)
EOSINOPHIL NFR BLD AUTO: 0 % (ref 0–6)
ERYTHROCYTE [DISTWIDTH] IN BLOOD BY AUTOMATED COUNT: 12.6 % (ref 11.6–15.1)
ETHANOL EXG-MCNC: 0.11 MG/DL
ETHANOL SERPL-MCNC: 282 MG/DL (ref 0–3)
EXT PREG TEST URINE: NEGATIVE
FINE GRAN CASTS URNS QL MICRO: ABNORMAL /LPF
GFR SERPL CREATININE-BSD FRML MDRD: 81 ML/MIN/1.73SQ M
GLUCOSE SERPL-MCNC: 144 MG/DL (ref 65–140)
GLUCOSE UR STRIP-MCNC: NEGATIVE MG/DL
HCT VFR BLD AUTO: 48.5 % (ref 34.8–46.1)
HGB BLD-MCNC: 16.2 G/DL (ref 11.5–15.4)
HGB UR QL STRIP.AUTO: ABNORMAL
HYALINE CASTS #/AREA URNS LPF: ABNORMAL /LPF
IMM GRANULOCYTES # BLD AUTO: 0.04 THOUSAND/UL (ref 0–0.2)
IMM GRANULOCYTES NFR BLD AUTO: 0 % (ref 0–2)
INR PPP: 0.93 (ref 0.86–1.17)
KETONES UR STRIP-MCNC: NEGATIVE MG/DL
LEUKOCYTE ESTERASE UR QL STRIP: NEGATIVE
LYMPHOCYTES # BLD AUTO: 3.04 THOUSANDS/ΜL (ref 0.6–4.47)
LYMPHOCYTES NFR BLD AUTO: 28 % (ref 14–44)
MAGNESIUM SERPL-MCNC: 2.1 MG/DL (ref 1.6–2.6)
MCH RBC QN AUTO: 32.7 PG (ref 26.8–34.3)
MCHC RBC AUTO-ENTMCNC: 33.4 G/DL (ref 31.4–37.4)
MCV RBC AUTO: 98 FL (ref 82–98)
METHADONE UR QL: NEGATIVE
MONOCYTES # BLD AUTO: 0.94 THOUSAND/ΜL (ref 0.17–1.22)
MONOCYTES NFR BLD AUTO: 9 % (ref 4–12)
MUCOUS THREADS UR QL AUTO: ABNORMAL
NEUTROPHILS # BLD AUTO: 6.9 THOUSANDS/ΜL (ref 1.85–7.62)
NEUTS SEG NFR BLD AUTO: 63 % (ref 43–75)
NITRITE UR QL STRIP: NEGATIVE
NON-SQ EPI CELLS URNS QL MICRO: ABNORMAL /HPF
NRBC BLD AUTO-RTO: 0 /100 WBCS
OPIATES UR QL SCN: NEGATIVE
P AXIS: 33 DEGREES
P AXIS: 73 DEGREES
PCP UR QL: NEGATIVE
PH UR STRIP.AUTO: 5 [PH] (ref 4.5–8)
PLATELET # BLD AUTO: 388 THOUSANDS/UL (ref 149–390)
PMV BLD AUTO: 9.2 FL (ref 8.9–12.7)
POTASSIUM SERPL-SCNC: 3.5 MMOL/L (ref 3.5–5.3)
PR INTERVAL: 150 MS
PR INTERVAL: 168 MS
PROT SERPL-MCNC: 9 G/DL (ref 6.4–8.2)
PROT UR STRIP-MCNC: >=300 MG/DL
PROTHROMBIN TIME: 12.6 SECONDS (ref 11.8–14.2)
QRS AXIS: 54 DEGREES
QRS AXIS: 80 DEGREES
QRSD INTERVAL: 68 MS
QRSD INTERVAL: 72 MS
QT INTERVAL: 296 MS
QT INTERVAL: 354 MS
QTC INTERVAL: 435 MS
QTC INTERVAL: 459 MS
RBC # BLD AUTO: 4.95 MILLION/UL (ref 3.81–5.12)
RBC #/AREA URNS AUTO: ABNORMAL /HPF
SODIUM SERPL-SCNC: 141 MMOL/L (ref 136–145)
SP GR UR STRIP.AUTO: >=1.03 (ref 1–1.03)
T WAVE AXIS: -1 DEGREES
T WAVE AXIS: 64 DEGREES
THC UR QL: NEGATIVE
TROPONIN I SERPL-MCNC: 0.02 NG/ML
TROPONIN I SERPL-MCNC: <0.02 NG/ML
UROBILINOGEN UR QL STRIP.AUTO: 0.2 E.U./DL
VENTRICULAR RATE: 101 BPM
VENTRICULAR RATE: 130 BPM
WBC # BLD AUTO: 11 THOUSAND/UL (ref 4.31–10.16)
WBC #/AREA URNS AUTO: ABNORMAL /HPF

## 2019-05-07 PROCEDURE — 94640 AIRWAY INHALATION TREATMENT: CPT

## 2019-05-07 PROCEDURE — 82075 ASSAY OF BREATH ETHANOL: CPT | Performed by: PHYSICIAN ASSISTANT

## 2019-05-07 PROCEDURE — 81025 URINE PREGNANCY TEST: CPT | Performed by: NURSE PRACTITIONER

## 2019-05-07 PROCEDURE — 99285 EMERGENCY DEPT VISIT HI MDM: CPT | Performed by: NURSE PRACTITIONER

## 2019-05-07 PROCEDURE — 83735 ASSAY OF MAGNESIUM: CPT | Performed by: NURSE PRACTITIONER

## 2019-05-07 PROCEDURE — 96375 TX/PRO/DX INJ NEW DRUG ADDON: CPT

## 2019-05-07 PROCEDURE — 93005 ELECTROCARDIOGRAM TRACING: CPT

## 2019-05-07 PROCEDURE — 84484 ASSAY OF TROPONIN QUANT: CPT | Performed by: HOSPITALIST

## 2019-05-07 PROCEDURE — 80320 DRUG SCREEN QUANTALCOHOLS: CPT | Performed by: NURSE PRACTITIONER

## 2019-05-07 PROCEDURE — 93306 TTE W/DOPPLER COMPLETE: CPT | Performed by: INTERNAL MEDICINE

## 2019-05-07 PROCEDURE — 93010 ELECTROCARDIOGRAM REPORT: CPT | Performed by: INTERNAL MEDICINE

## 2019-05-07 PROCEDURE — 82550 ASSAY OF CK (CPK): CPT | Performed by: NURSE PRACTITIONER

## 2019-05-07 PROCEDURE — 85025 COMPLETE CBC W/AUTO DIFF WBC: CPT | Performed by: NURSE PRACTITIONER

## 2019-05-07 PROCEDURE — 36415 COLL VENOUS BLD VENIPUNCTURE: CPT | Performed by: NURSE PRACTITIONER

## 2019-05-07 PROCEDURE — 96361 HYDRATE IV INFUSION ADD-ON: CPT

## 2019-05-07 PROCEDURE — 71046 X-RAY EXAM CHEST 2 VIEWS: CPT

## 2019-05-07 PROCEDURE — 80053 COMPREHEN METABOLIC PANEL: CPT | Performed by: NURSE PRACTITIONER

## 2019-05-07 PROCEDURE — 99220 PR INITIAL OBSERVATION CARE/DAY 70 MINUTES: CPT | Performed by: HOSPITALIST

## 2019-05-07 PROCEDURE — 80307 DRUG TEST PRSMV CHEM ANLYZR: CPT | Performed by: NURSE PRACTITIONER

## 2019-05-07 PROCEDURE — 96374 THER/PROPH/DIAG INJ IV PUSH: CPT

## 2019-05-07 PROCEDURE — 81001 URINALYSIS AUTO W/SCOPE: CPT

## 2019-05-07 PROCEDURE — 82553 CREATINE MB FRACTION: CPT | Performed by: NURSE PRACTITIONER

## 2019-05-07 PROCEDURE — 85730 THROMBOPLASTIN TIME PARTIAL: CPT | Performed by: NURSE PRACTITIONER

## 2019-05-07 PROCEDURE — 93306 TTE W/DOPPLER COMPLETE: CPT

## 2019-05-07 PROCEDURE — 85610 PROTHROMBIN TIME: CPT | Performed by: NURSE PRACTITIONER

## 2019-05-07 PROCEDURE — 99285 EMERGENCY DEPT VISIT HI MDM: CPT

## 2019-05-07 PROCEDURE — 84484 ASSAY OF TROPONIN QUANT: CPT | Performed by: NURSE PRACTITIONER

## 2019-05-07 RX ORDER — THIAMINE MONONITRATE (VIT B1) 100 MG
100 TABLET ORAL DAILY
Status: DISCONTINUED | OUTPATIENT
Start: 2019-05-07 | End: 2019-05-08 | Stop reason: HOSPADM

## 2019-05-07 RX ORDER — CITALOPRAM 20 MG/1
20 TABLET ORAL DAILY
Status: DISCONTINUED | OUTPATIENT
Start: 2019-05-07 | End: 2019-05-08 | Stop reason: HOSPADM

## 2019-05-07 RX ORDER — IPRATROPIUM BROMIDE AND ALBUTEROL SULFATE 2.5; .5 MG/3ML; MG/3ML
3 SOLUTION RESPIRATORY (INHALATION) ONCE
Status: COMPLETED | OUTPATIENT
Start: 2019-05-07 | End: 2019-05-07

## 2019-05-07 RX ORDER — HYDROXYZINE HYDROCHLORIDE 25 MG/1
25 TABLET, FILM COATED ORAL ONCE
Status: COMPLETED | OUTPATIENT
Start: 2019-05-07 | End: 2019-05-07

## 2019-05-07 RX ORDER — LORAZEPAM 1 MG/1
1 TABLET ORAL EVERY 8 HOURS PRN
Status: DISCONTINUED | OUTPATIENT
Start: 2019-05-07 | End: 2019-05-08 | Stop reason: HOSPADM

## 2019-05-07 RX ORDER — METOPROLOL TARTRATE 50 MG/1
50 TABLET, FILM COATED ORAL EVERY 12 HOURS
Status: DISCONTINUED | OUTPATIENT
Start: 2019-05-07 | End: 2019-05-08 | Stop reason: HOSPADM

## 2019-05-07 RX ORDER — ACETAMINOPHEN 325 MG/1
650 TABLET ORAL EVERY 4 HOURS PRN
Status: DISCONTINUED | OUTPATIENT
Start: 2019-05-07 | End: 2019-05-08 | Stop reason: HOSPADM

## 2019-05-07 RX ORDER — SUCRALFATE 1 G/1
1 TABLET ORAL
Status: DISCONTINUED | OUTPATIENT
Start: 2019-05-07 | End: 2019-05-08 | Stop reason: HOSPADM

## 2019-05-07 RX ORDER — FOLIC ACID 1 MG/1
1 TABLET ORAL DAILY
Status: DISCONTINUED | OUTPATIENT
Start: 2019-05-07 | End: 2019-05-08 | Stop reason: HOSPADM

## 2019-05-07 RX ORDER — AZITHROMYCIN 250 MG/1
500 TABLET, FILM COATED ORAL ONCE
Status: COMPLETED | OUTPATIENT
Start: 2019-05-07 | End: 2019-05-07

## 2019-05-07 RX ORDER — ASPIRIN 81 MG/1
324 TABLET, CHEWABLE ORAL ONCE
Status: COMPLETED | OUTPATIENT
Start: 2019-05-07 | End: 2019-05-07

## 2019-05-07 RX ORDER — LORAZEPAM 2 MG/ML
1 INJECTION INTRAMUSCULAR ONCE
Status: COMPLETED | OUTPATIENT
Start: 2019-05-07 | End: 2019-05-07

## 2019-05-07 RX ORDER — ACETAMINOPHEN 325 MG/1
650 TABLET ORAL ONCE
Status: COMPLETED | OUTPATIENT
Start: 2019-05-07 | End: 2019-05-07

## 2019-05-07 RX ORDER — NICOTINE 21 MG/24HR
1 PATCH, TRANSDERMAL 24 HOURS TRANSDERMAL DAILY
Status: DISCONTINUED | OUTPATIENT
Start: 2019-05-07 | End: 2019-05-08 | Stop reason: HOSPADM

## 2019-05-07 RX ORDER — LEVOTHYROXINE SODIUM 0.05 MG/1
50 TABLET ORAL
Status: DISCONTINUED | OUTPATIENT
Start: 2019-05-07 | End: 2019-05-08 | Stop reason: HOSPADM

## 2019-05-07 RX ORDER — ONDANSETRON 2 MG/ML
4 INJECTION INTRAMUSCULAR; INTRAVENOUS ONCE
Status: COMPLETED | OUTPATIENT
Start: 2019-05-07 | End: 2019-05-07

## 2019-05-07 RX ORDER — LORAZEPAM 2 MG/ML
0.5 INJECTION INTRAMUSCULAR ONCE
Status: COMPLETED | OUTPATIENT
Start: 2019-05-07 | End: 2019-05-07

## 2019-05-07 RX ORDER — AMLODIPINE BESYLATE 5 MG/1
5 TABLET ORAL DAILY
Status: DISCONTINUED | OUTPATIENT
Start: 2019-05-07 | End: 2019-05-08 | Stop reason: HOSPADM

## 2019-05-07 RX ORDER — GUAIFENESIN 600 MG
600 TABLET, EXTENDED RELEASE 12 HR ORAL ONCE
Status: COMPLETED | OUTPATIENT
Start: 2019-05-07 | End: 2019-05-07

## 2019-05-07 RX ORDER — CHLORDIAZEPOXIDE HYDROCHLORIDE 5 MG/1
5 CAPSULE, GELATIN COATED ORAL EVERY 8 HOURS SCHEDULED
Status: DISCONTINUED | OUTPATIENT
Start: 2019-05-07 | End: 2019-05-08 | Stop reason: HOSPADM

## 2019-05-07 RX ADMIN — AZITHROMYCIN 500 MG: 250 TABLET, FILM COATED ORAL at 06:26

## 2019-05-07 RX ADMIN — ASPIRIN 81 MG 324 MG: 81 TABLET ORAL at 05:43

## 2019-05-07 RX ADMIN — LORAZEPAM 1 MG: 1 TABLET ORAL at 16:17

## 2019-05-07 RX ADMIN — ACETAMINOPHEN 650 MG: 325 TABLET ORAL at 14:02

## 2019-05-07 RX ADMIN — SUCRALFATE 1 G: 1 TABLET ORAL at 21:08

## 2019-05-07 RX ADMIN — LORAZEPAM 1 MG: 2 INJECTION INTRAMUSCULAR; INTRAVENOUS at 08:04

## 2019-05-07 RX ADMIN — SODIUM CHLORIDE 1000 ML: 0.9 INJECTION, SOLUTION INTRAVENOUS at 05:46

## 2019-05-07 RX ADMIN — LORAZEPAM 0.5 MG: 2 INJECTION INTRAMUSCULAR; INTRAVENOUS at 10:10

## 2019-05-07 RX ADMIN — ONDANSETRON 4 MG: 2 INJECTION INTRAMUSCULAR; INTRAVENOUS at 05:52

## 2019-05-07 RX ADMIN — Medication 1 TABLET: at 12:19

## 2019-05-07 RX ADMIN — NICOTINE 1 PATCH: 21 PATCH TRANSDERMAL at 12:19

## 2019-05-07 RX ADMIN — CHLORDIAZEPOXIDE HYDROCHLORIDE 5 MG: 5 CAPSULE ORAL at 21:08

## 2019-05-07 RX ADMIN — ACETAMINOPHEN 650 MG: 325 TABLET ORAL at 06:42

## 2019-05-07 RX ADMIN — SUCRALFATE 1 G: 1 TABLET ORAL at 16:17

## 2019-05-07 RX ADMIN — CHLORDIAZEPOXIDE HYDROCHLORIDE 5 MG: 5 CAPSULE ORAL at 14:02

## 2019-05-07 RX ADMIN — FOLIC ACID 1 MG: 1 TABLET ORAL at 12:19

## 2019-05-07 RX ADMIN — Medication 100 MG: at 12:19

## 2019-05-07 RX ADMIN — GUAIFENESIN 600 MG: 600 TABLET, EXTENDED RELEASE ORAL at 05:58

## 2019-05-07 RX ADMIN — HYDROXYZINE HYDROCHLORIDE 25 MG: 25 TABLET ORAL at 07:09

## 2019-05-07 RX ADMIN — SUCRALFATE 1 G: 1 TABLET ORAL at 12:19

## 2019-05-07 RX ADMIN — FAMOTIDINE 20 MG: 10 INJECTION, SOLUTION INTRAVENOUS at 05:56

## 2019-05-07 RX ADMIN — IPRATROPIUM BROMIDE AND ALBUTEROL SULFATE 3 ML: 2.5; .5 SOLUTION RESPIRATORY (INHALATION) at 06:28

## 2019-05-08 VITALS
OXYGEN SATURATION: 97 % | SYSTOLIC BLOOD PRESSURE: 154 MMHG | WEIGHT: 174.16 LBS | DIASTOLIC BLOOD PRESSURE: 98 MMHG | HEART RATE: 82 BPM | TEMPERATURE: 97.2 F | BODY MASS INDEX: 30.85 KG/M2 | RESPIRATION RATE: 16 BRPM

## 2019-05-08 LAB
ALBUMIN SERPL BCP-MCNC: 3.4 G/DL (ref 3.5–5)
ALP SERPL-CCNC: 90 U/L (ref 46–116)
ALT SERPL W P-5'-P-CCNC: 59 U/L (ref 12–78)
ANION GAP SERPL CALCULATED.3IONS-SCNC: 10 MMOL/L (ref 4–13)
AST SERPL W P-5'-P-CCNC: 52 U/L (ref 5–45)
BASOPHILS # BLD AUTO: 0.03 THOUSANDS/ΜL (ref 0–0.1)
BASOPHILS NFR BLD AUTO: 0 % (ref 0–1)
BILIRUB SERPL-MCNC: 1.08 MG/DL (ref 0.2–1)
BUN SERPL-MCNC: 10 MG/DL (ref 5–25)
CALCIUM SERPL-MCNC: 9.2 MG/DL (ref 8.3–10.1)
CHLORIDE SERPL-SCNC: 102 MMOL/L (ref 100–108)
CO2 SERPL-SCNC: 28 MMOL/L (ref 21–32)
CREAT SERPL-MCNC: 0.68 MG/DL (ref 0.6–1.3)
EOSINOPHIL # BLD AUTO: 0.1 THOUSAND/ΜL (ref 0–0.61)
EOSINOPHIL NFR BLD AUTO: 1 % (ref 0–6)
ERYTHROCYTE [DISTWIDTH] IN BLOOD BY AUTOMATED COUNT: 12.4 % (ref 11.6–15.1)
GFR SERPL CREATININE-BSD FRML MDRD: 102 ML/MIN/1.73SQ M
GLUCOSE P FAST SERPL-MCNC: 103 MG/DL (ref 65–99)
GLUCOSE SERPL-MCNC: 103 MG/DL (ref 65–140)
HCT VFR BLD AUTO: 40.9 % (ref 34.8–46.1)
HGB BLD-MCNC: 13.8 G/DL (ref 11.5–15.4)
IMM GRANULOCYTES # BLD AUTO: 0.01 THOUSAND/UL (ref 0–0.2)
IMM GRANULOCYTES NFR BLD AUTO: 0 % (ref 0–2)
LYMPHOCYTES # BLD AUTO: 1.73 THOUSANDS/ΜL (ref 0.6–4.47)
LYMPHOCYTES NFR BLD AUTO: 22 % (ref 14–44)
MAGNESIUM SERPL-MCNC: 2.2 MG/DL (ref 1.6–2.6)
MCH RBC QN AUTO: 32.8 PG (ref 26.8–34.3)
MCHC RBC AUTO-ENTMCNC: 33.7 G/DL (ref 31.4–37.4)
MCV RBC AUTO: 97 FL (ref 82–98)
MONOCYTES # BLD AUTO: 0.75 THOUSAND/ΜL (ref 0.17–1.22)
MONOCYTES NFR BLD AUTO: 10 % (ref 4–12)
NEUTROPHILS # BLD AUTO: 5.31 THOUSANDS/ΜL (ref 1.85–7.62)
NEUTS SEG NFR BLD AUTO: 67 % (ref 43–75)
NRBC BLD AUTO-RTO: 0 /100 WBCS
PLATELET # BLD AUTO: 275 THOUSANDS/UL (ref 149–390)
PMV BLD AUTO: 9.8 FL (ref 8.9–12.7)
POTASSIUM SERPL-SCNC: 3.7 MMOL/L (ref 3.5–5.3)
PROT SERPL-MCNC: 7.2 G/DL (ref 6.4–8.2)
RBC # BLD AUTO: 4.21 MILLION/UL (ref 3.81–5.12)
SODIUM SERPL-SCNC: 140 MMOL/L (ref 136–145)
WBC # BLD AUTO: 7.93 THOUSAND/UL (ref 4.31–10.16)

## 2019-05-08 PROCEDURE — 83735 ASSAY OF MAGNESIUM: CPT | Performed by: HOSPITALIST

## 2019-05-08 PROCEDURE — 99217 PR OBSERVATION CARE DISCHARGE MANAGEMENT: CPT | Performed by: HOSPITALIST

## 2019-05-08 PROCEDURE — 85025 COMPLETE CBC W/AUTO DIFF WBC: CPT | Performed by: HOSPITALIST

## 2019-05-08 PROCEDURE — 80053 COMPREHEN METABOLIC PANEL: CPT | Performed by: HOSPITALIST

## 2019-05-08 RX ORDER — LORAZEPAM 1 MG/1
1 TABLET ORAL EVERY 8 HOURS PRN
Qty: 12 TABLET | Refills: 0 | Status: SHIPPED | OUTPATIENT
Start: 2019-05-08 | End: 2019-05-30 | Stop reason: HOSPADM

## 2019-05-08 RX ORDER — SUCRALFATE 1 G/1
1 TABLET ORAL
Qty: 56 TABLET | Refills: 0 | Status: SHIPPED | OUTPATIENT
Start: 2019-05-08 | End: 2019-09-18

## 2019-05-08 RX ORDER — PANTOPRAZOLE SODIUM 40 MG/1
40 TABLET, DELAYED RELEASE ORAL DAILY
Qty: 30 TABLET | Refills: 0 | Status: SHIPPED | OUTPATIENT
Start: 2019-05-08 | End: 2019-09-26 | Stop reason: HOSPADM

## 2019-05-08 RX ORDER — ESCITALOPRAM OXALATE 10 MG/1
10 TABLET ORAL DAILY
Qty: 30 TABLET | Refills: 1 | Status: SHIPPED | OUTPATIENT
Start: 2019-05-08 | End: 2019-05-30 | Stop reason: HOSPADM

## 2019-05-08 RX ADMIN — LISINOPRIL: 20 TABLET ORAL at 08:27

## 2019-05-08 RX ADMIN — METOPROLOL TARTRATE 50 MG: 50 TABLET, FILM COATED ORAL at 00:29

## 2019-05-08 RX ADMIN — METOPROLOL TARTRATE 50 MG: 50 TABLET, FILM COATED ORAL at 11:44

## 2019-05-08 RX ADMIN — LORAZEPAM 1 MG: 1 TABLET ORAL at 00:30

## 2019-05-08 RX ADMIN — Medication 100 MG: at 08:28

## 2019-05-08 RX ADMIN — SUCRALFATE 1 G: 1 TABLET ORAL at 16:36

## 2019-05-08 RX ADMIN — Medication 1 TABLET: at 08:28

## 2019-05-08 RX ADMIN — LEVOTHYROXINE SODIUM 50 MCG: 50 TABLET ORAL at 06:25

## 2019-05-08 RX ADMIN — FOLIC ACID 1 MG: 1 TABLET ORAL at 08:28

## 2019-05-08 RX ADMIN — CHLORDIAZEPOXIDE HYDROCHLORIDE 5 MG: 5 CAPSULE ORAL at 06:25

## 2019-05-08 RX ADMIN — CHLORDIAZEPOXIDE HYDROCHLORIDE 5 MG: 5 CAPSULE ORAL at 15:03

## 2019-05-08 RX ADMIN — LORAZEPAM 1 MG: 1 TABLET ORAL at 11:44

## 2019-05-08 RX ADMIN — NICOTINE 1 PATCH: 21 PATCH TRANSDERMAL at 08:28

## 2019-05-08 RX ADMIN — SUCRALFATE 1 G: 1 TABLET ORAL at 11:44

## 2019-05-08 RX ADMIN — ACETAMINOPHEN 650 MG: 325 TABLET ORAL at 08:31

## 2019-05-08 RX ADMIN — SUCRALFATE 1 G: 1 TABLET ORAL at 06:25

## 2019-05-08 RX ADMIN — AMLODIPINE BESYLATE 5 MG: 5 TABLET ORAL at 08:28

## 2019-05-08 RX ADMIN — CITALOPRAM HYDROBROMIDE 20 MG: 20 TABLET ORAL at 08:28

## 2019-05-15 ENCOUNTER — HOSPITAL ENCOUNTER (EMERGENCY)
Facility: HOSPITAL | Age: 54
Discharge: HOME | End: 2019-05-15
Attending: EMERGENCY MEDICINE | Admitting: EMERGENCY MEDICINE
Payer: MEDICARE

## 2019-05-15 VITALS
SYSTOLIC BLOOD PRESSURE: 146 MMHG | HEART RATE: 76 BPM | OXYGEN SATURATION: 97 % | RESPIRATION RATE: 18 BRPM | TEMPERATURE: 98.3 F | BODY MASS INDEX: 30.85 KG/M2 | DIASTOLIC BLOOD PRESSURE: 76 MMHG | WEIGHT: 174.16 LBS

## 2019-05-15 DIAGNOSIS — F10.10 ALCOHOL ABUSE: ICD-10-CM

## 2019-05-15 DIAGNOSIS — R45.851 SUICIDAL IDEATIONS: Primary | ICD-10-CM

## 2019-05-15 LAB
AMPHETAMINES SERPL QL SCN: NEGATIVE
BARBITURATES UR QL: NEGATIVE
BENZODIAZ UR QL: NEGATIVE
COCAINE UR QL: NEGATIVE
ETHANOL EXG-MCNC: 0.17 MG/DL
ETHANOL EXG-MCNC: 201 MG/DL
METHADONE UR QL: NEGATIVE
OPIATES UR QL SCN: NEGATIVE
PCP UR QL: NEGATIVE
THC UR QL: NEGATIVE

## 2019-05-15 PROCEDURE — 99284 EMERGENCY DEPT VISIT MOD MDM: CPT | Performed by: PHYSICIAN ASSISTANT

## 2019-05-15 PROCEDURE — 80307 DRUG TEST PRSMV CHEM ANLYZR: CPT | Performed by: PHYSICIAN ASSISTANT

## 2019-05-15 PROCEDURE — 99284 EMERGENCY DEPT VISIT MOD MDM: CPT

## 2019-05-15 PROCEDURE — 82075 ASSAY OF BREATH ETHANOL: CPT | Performed by: PHYSICIAN ASSISTANT

## 2019-05-15 RX ORDER — AMLODIPINE BESYLATE 5 MG/1
5 TABLET ORAL DAILY
Status: DISCONTINUED | OUTPATIENT
Start: 2019-05-15 | End: 2019-05-16 | Stop reason: HOSPADM

## 2019-05-15 RX ORDER — LISINOPRIL 20 MG/1
20 TABLET ORAL DAILY
Status: DISCONTINUED | OUTPATIENT
Start: 2019-05-15 | End: 2019-05-16 | Stop reason: HOSPADM

## 2019-05-15 RX ORDER — METOPROLOL TARTRATE 50 MG/1
50 TABLET, FILM COATED ORAL EVERY 12 HOURS SCHEDULED
Status: DISCONTINUED | OUTPATIENT
Start: 2019-05-15 | End: 2019-05-16 | Stop reason: HOSPADM

## 2019-05-15 RX ORDER — PANTOPRAZOLE SODIUM 40 MG/1
40 TABLET, DELAYED RELEASE ORAL DAILY
Status: DISCONTINUED | OUTPATIENT
Start: 2019-05-15 | End: 2019-05-16 | Stop reason: HOSPADM

## 2019-05-15 RX ORDER — ESCITALOPRAM OXALATE 10 MG/1
10 TABLET ORAL DAILY
Status: DISCONTINUED | OUTPATIENT
Start: 2019-05-15 | End: 2019-05-16 | Stop reason: HOSPADM

## 2019-05-15 RX ORDER — LORAZEPAM 1 MG/1
1 TABLET ORAL ONCE
Status: COMPLETED | OUTPATIENT
Start: 2019-05-15 | End: 2019-05-15

## 2019-05-15 RX ORDER — HYDROXYZINE HYDROCHLORIDE 25 MG/1
25 TABLET, FILM COATED ORAL ONCE
Status: COMPLETED | OUTPATIENT
Start: 2019-05-15 | End: 2019-05-15

## 2019-05-15 RX ORDER — HYDROCHLOROTHIAZIDE 12.5 MG/1
25 TABLET ORAL DAILY
Status: DISCONTINUED | OUTPATIENT
Start: 2019-05-15 | End: 2019-05-16 | Stop reason: HOSPADM

## 2019-05-15 RX ORDER — LEVOTHYROXINE SODIUM 0.05 MG/1
50 TABLET ORAL DAILY
Status: DISCONTINUED | OUTPATIENT
Start: 2019-05-15 | End: 2019-05-16 | Stop reason: HOSPADM

## 2019-05-15 RX ADMIN — ESCITALOPRAM OXALATE 10 MG: 10 TABLET ORAL at 15:20

## 2019-05-15 RX ADMIN — HYDROCORTISONE 2.5%: 25 CREAM TOPICAL at 15:21

## 2019-05-15 RX ADMIN — LISINOPRIL 20 MG: 20 TABLET ORAL at 14:59

## 2019-05-15 RX ADMIN — AMLODIPINE BESYLATE 5 MG: 5 TABLET ORAL at 14:59

## 2019-05-15 RX ADMIN — LEVOTHYROXINE SODIUM 50 MCG: 50 TABLET ORAL at 14:59

## 2019-05-15 RX ADMIN — LORAZEPAM 1 MG: 1 TABLET ORAL at 17:30

## 2019-05-15 RX ADMIN — LORAZEPAM 1 MG: 1 TABLET ORAL at 10:23

## 2019-05-15 RX ADMIN — HYDROCHLOROTHIAZIDE 25 MG: 12.5 TABLET ORAL at 15:00

## 2019-05-15 RX ADMIN — METOPROLOL TARTRATE 50 MG: 50 TABLET, FILM COATED ORAL at 14:59

## 2019-05-15 RX ADMIN — HYDROXYZINE HYDROCHLORIDE 25 MG: 25 TABLET ORAL at 12:15

## 2019-05-15 RX ADMIN — LORAZEPAM 1 MG: 1 TABLET ORAL at 15:00

## 2019-05-15 RX ADMIN — PANTOPRAZOLE SODIUM 40 MG: 40 TABLET, DELAYED RELEASE ORAL at 14:59

## 2019-05-23 ENCOUNTER — HOSPITAL ENCOUNTER (EMERGENCY)
Facility: HOSPITAL | Age: 54
End: 2019-05-24
Attending: EMERGENCY MEDICINE
Payer: MEDICARE

## 2019-05-23 ENCOUNTER — APPOINTMENT (EMERGENCY)
Dept: CT IMAGING | Facility: HOSPITAL | Age: 54
End: 2019-05-23
Payer: MEDICARE

## 2019-05-23 ENCOUNTER — APPOINTMENT (EMERGENCY)
Dept: RADIOLOGY | Facility: HOSPITAL | Age: 54
End: 2019-05-23
Payer: MEDICARE

## 2019-05-23 DIAGNOSIS — F32.A DEPRESSION WITH SUICIDAL IDEATION: Primary | ICD-10-CM

## 2019-05-23 DIAGNOSIS — F10.929 ALCOHOL INTOXICATION (HCC): ICD-10-CM

## 2019-05-23 DIAGNOSIS — R45.851 DEPRESSION WITH SUICIDAL IDEATION: Primary | ICD-10-CM

## 2019-05-23 LAB
ALBUMIN SERPL BCP-MCNC: 3.6 G/DL (ref 3.5–5)
ALP SERPL-CCNC: 84 U/L (ref 46–116)
ALT SERPL W P-5'-P-CCNC: 150 U/L (ref 12–78)
AMPHETAMINES SERPL QL SCN: NEGATIVE
ANION GAP SERPL CALCULATED.3IONS-SCNC: 13 MMOL/L (ref 4–13)
APAP SERPL-MCNC: <2 UG/ML (ref 10–20)
AST SERPL W P-5'-P-CCNC: 77 U/L (ref 5–45)
BARBITURATES UR QL: NEGATIVE
BASOPHILS # BLD AUTO: 0.03 THOUSANDS/ΜL (ref 0–0.1)
BASOPHILS NFR BLD AUTO: 0 % (ref 0–1)
BENZODIAZ UR QL: POSITIVE
BILIRUB SERPL-MCNC: 0.18 MG/DL (ref 0.2–1)
BUN SERPL-MCNC: 9 MG/DL (ref 5–25)
CALCIUM SERPL-MCNC: 8.5 MG/DL (ref 8.3–10.1)
CHLORIDE SERPL-SCNC: 110 MMOL/L (ref 100–108)
CO2 SERPL-SCNC: 24 MMOL/L (ref 21–32)
COCAINE UR QL: NEGATIVE
CREAT SERPL-MCNC: 0.89 MG/DL (ref 0.6–1.3)
EOSINOPHIL # BLD AUTO: 0.1 THOUSAND/ΜL (ref 0–0.61)
EOSINOPHIL NFR BLD AUTO: 1 % (ref 0–6)
ERYTHROCYTE [DISTWIDTH] IN BLOOD BY AUTOMATED COUNT: 12.9 % (ref 11.6–15.1)
ETHANOL EXG-MCNC: 0.09 MG/DL
ETHANOL EXG-MCNC: 0.12 MG/DL
ETHANOL EXG-MCNC: 0.22 MG/DL
ETHANOL SERPL-MCNC: 283 MG/DL (ref 0–3)
GFR SERPL CREATININE-BSD FRML MDRD: 75 ML/MIN/1.73SQ M
GLUCOSE SERPL-MCNC: 106 MG/DL (ref 65–140)
HCT VFR BLD AUTO: 40.6 % (ref 34.8–46.1)
HGB BLD-MCNC: 13.6 G/DL (ref 11.5–15.4)
IMM GRANULOCYTES # BLD AUTO: 0.05 THOUSAND/UL (ref 0–0.2)
IMM GRANULOCYTES NFR BLD AUTO: 1 % (ref 0–2)
LYMPHOCYTES # BLD AUTO: 3.29 THOUSANDS/ΜL (ref 0.6–4.47)
LYMPHOCYTES NFR BLD AUTO: 44 % (ref 14–44)
MCH RBC QN AUTO: 33.1 PG (ref 26.8–34.3)
MCHC RBC AUTO-ENTMCNC: 33.5 G/DL (ref 31.4–37.4)
MCV RBC AUTO: 99 FL (ref 82–98)
METHADONE UR QL: NEGATIVE
MONOCYTES # BLD AUTO: 0.93 THOUSAND/ΜL (ref 0.17–1.22)
MONOCYTES NFR BLD AUTO: 12 % (ref 4–12)
NEUTROPHILS # BLD AUTO: 3.2 THOUSANDS/ΜL (ref 1.85–7.62)
NEUTS SEG NFR BLD AUTO: 42 % (ref 43–75)
NRBC BLD AUTO-RTO: 0 /100 WBCS
OPIATES UR QL SCN: NEGATIVE
PCP UR QL: NEGATIVE
PLATELET # BLD AUTO: 277 THOUSANDS/UL (ref 149–390)
PMV BLD AUTO: 9.4 FL (ref 8.9–12.7)
POTASSIUM SERPL-SCNC: 3.6 MMOL/L (ref 3.5–5.3)
PROT SERPL-MCNC: 7.1 G/DL (ref 6.4–8.2)
RBC # BLD AUTO: 4.11 MILLION/UL (ref 3.81–5.12)
SALICYLATES SERPL-MCNC: 4 MG/DL (ref 3–20)
SODIUM SERPL-SCNC: 147 MMOL/L (ref 136–145)
THC UR QL: NEGATIVE
WBC # BLD AUTO: 7.6 THOUSAND/UL (ref 4.31–10.16)

## 2019-05-23 PROCEDURE — 82075 ASSAY OF BREATH ETHANOL: CPT | Performed by: EMERGENCY MEDICINE

## 2019-05-23 PROCEDURE — 70450 CT HEAD/BRAIN W/O DYE: CPT

## 2019-05-23 PROCEDURE — 80053 COMPREHEN METABOLIC PANEL: CPT | Performed by: EMERGENCY MEDICINE

## 2019-05-23 PROCEDURE — 96361 HYDRATE IV INFUSION ADD-ON: CPT

## 2019-05-23 PROCEDURE — 80320 DRUG SCREEN QUANTALCOHOLS: CPT | Performed by: EMERGENCY MEDICINE

## 2019-05-23 PROCEDURE — 80329 ANALGESICS NON-OPIOID 1 OR 2: CPT | Performed by: EMERGENCY MEDICINE

## 2019-05-23 PROCEDURE — 96360 HYDRATION IV INFUSION INIT: CPT

## 2019-05-23 PROCEDURE — 99285 EMERGENCY DEPT VISIT HI MDM: CPT

## 2019-05-23 PROCEDURE — 80307 DRUG TEST PRSMV CHEM ANLYZR: CPT | Performed by: EMERGENCY MEDICINE

## 2019-05-23 PROCEDURE — 85025 COMPLETE CBC W/AUTO DIFF WBC: CPT | Performed by: EMERGENCY MEDICINE

## 2019-05-23 PROCEDURE — 73610 X-RAY EXAM OF ANKLE: CPT

## 2019-05-23 PROCEDURE — 36415 COLL VENOUS BLD VENIPUNCTURE: CPT | Performed by: EMERGENCY MEDICINE

## 2019-05-23 PROCEDURE — 99284 EMERGENCY DEPT VISIT MOD MDM: CPT | Performed by: EMERGENCY MEDICINE

## 2019-05-23 RX ORDER — TRAZODONE HYDROCHLORIDE 50 MG/1
50 TABLET ORAL
COMMUNITY
End: 2019-05-30 | Stop reason: HOSPADM

## 2019-05-23 RX ORDER — SODIUM CHLORIDE 9 MG/ML
1000 INJECTION, SOLUTION INTRAVENOUS ONCE
Status: COMPLETED | OUTPATIENT
Start: 2019-05-23 | End: 2019-05-23

## 2019-05-23 RX ORDER — LORAZEPAM 1 MG/1
1 TABLET ORAL ONCE
Status: COMPLETED | OUTPATIENT
Start: 2019-05-23 | End: 2019-05-23

## 2019-05-23 RX ADMIN — SODIUM CHLORIDE 1000 ML: 0.9 INJECTION, SOLUTION INTRAVENOUS at 21:10

## 2019-05-23 RX ADMIN — SODIUM CHLORIDE 1000 ML/HR: 0.9 INJECTION, SOLUTION INTRAVENOUS at 18:26

## 2019-05-23 RX ADMIN — LORAZEPAM 1 MG: 1 TABLET ORAL at 17:59

## 2019-05-24 ENCOUNTER — HOSPITAL ENCOUNTER (INPATIENT)
Facility: HOSPITAL | Age: 54
LOS: 6 days | Discharge: HOME | DRG: 885 | End: 2019-05-30
Attending: PSYCHIATRY & NEUROLOGY | Admitting: PSYCHIATRY & NEUROLOGY
Payer: MEDICARE

## 2019-05-24 VITALS
HEART RATE: 78 BPM | OXYGEN SATURATION: 96 % | RESPIRATION RATE: 18 BRPM | DIASTOLIC BLOOD PRESSURE: 94 MMHG | TEMPERATURE: 98.4 F | SYSTOLIC BLOOD PRESSURE: 151 MMHG

## 2019-05-24 DIAGNOSIS — F41.9 ANXIETY: ICD-10-CM

## 2019-05-24 DIAGNOSIS — I10 ESSENTIAL HYPERTENSION: ICD-10-CM

## 2019-05-24 DIAGNOSIS — F31.4 BIPOLAR AFFECTIVE DISORDER, DEPRESSED, SEVERE (HCC): ICD-10-CM

## 2019-05-24 DIAGNOSIS — F32.A DEPRESSION WITH SUICIDAL IDEATION: ICD-10-CM

## 2019-05-24 DIAGNOSIS — F10.20 ALCOHOL USE DISORDER, SEVERE, DEPENDENCE (HCC): Primary | ICD-10-CM

## 2019-05-24 DIAGNOSIS — R45.851 DEPRESSION WITH SUICIDAL IDEATION: ICD-10-CM

## 2019-05-24 LAB — EXT PREG TEST URINE: NEGATIVE

## 2019-05-24 PROCEDURE — 81025 URINE PREGNANCY TEST: CPT | Performed by: EMERGENCY MEDICINE

## 2019-05-24 RX ORDER — ACETAMINOPHEN 325 MG/1
650 TABLET ORAL EVERY 6 HOURS PRN
Status: DISCONTINUED | OUTPATIENT
Start: 2019-05-24 | End: 2019-05-30 | Stop reason: HOSPADM

## 2019-05-24 RX ORDER — OLANZAPINE 5 MG/1
5 TABLET ORAL EVERY 6 HOURS PRN
Status: CANCELLED | OUTPATIENT
Start: 2019-05-24

## 2019-05-24 RX ORDER — ESCITALOPRAM OXALATE 10 MG/1
10 TABLET ORAL DAILY
Status: DISCONTINUED | OUTPATIENT
Start: 2019-05-24 | End: 2019-05-24 | Stop reason: HOSPADM

## 2019-05-24 RX ORDER — ACETAMINOPHEN 325 MG/1
650 TABLET ORAL EVERY 4 HOURS PRN
Status: DISCONTINUED | OUTPATIENT
Start: 2019-05-24 | End: 2019-05-30 | Stop reason: HOSPADM

## 2019-05-24 RX ORDER — PANTOPRAZOLE SODIUM 40 MG/1
40 TABLET, DELAYED RELEASE ORAL
Status: DISCONTINUED | OUTPATIENT
Start: 2019-05-24 | End: 2019-05-24 | Stop reason: HOSPADM

## 2019-05-24 RX ORDER — BENZTROPINE MESYLATE 0.5 MG/1
1 TABLET ORAL EVERY 6 HOURS PRN
Status: CANCELLED | OUTPATIENT
Start: 2019-05-24

## 2019-05-24 RX ORDER — HALOPERIDOL 5 MG/ML
5 INJECTION INTRAMUSCULAR EVERY 6 HOURS PRN
Status: DISCONTINUED | OUTPATIENT
Start: 2019-05-24 | End: 2019-05-30 | Stop reason: HOSPADM

## 2019-05-24 RX ORDER — LORAZEPAM 1 MG/1
1 TABLET ORAL ONCE
Status: COMPLETED | OUTPATIENT
Start: 2019-05-24 | End: 2019-05-24

## 2019-05-24 RX ORDER — BENZTROPINE MESYLATE 1 MG/ML
1 INJECTION INTRAMUSCULAR; INTRAVENOUS EVERY 6 HOURS PRN
Status: CANCELLED | OUTPATIENT
Start: 2019-05-24

## 2019-05-24 RX ORDER — LORAZEPAM 1 MG/1
1 TABLET ORAL EVERY 6 HOURS PRN
Status: CANCELLED | OUTPATIENT
Start: 2019-05-24

## 2019-05-24 RX ORDER — LISINOPRIL 20 MG/1
20 TABLET ORAL DAILY
Status: DISCONTINUED | OUTPATIENT
Start: 2019-05-24 | End: 2019-05-24 | Stop reason: HOSPADM

## 2019-05-24 RX ORDER — ESCITALOPRAM OXALATE 10 MG/1
10 TABLET ORAL DAILY
Status: CANCELLED | OUTPATIENT
Start: 2019-05-25

## 2019-05-24 RX ORDER — ACETAMINOPHEN 325 MG/1
975 TABLET ORAL EVERY 6 HOURS PRN
Status: CANCELLED | OUTPATIENT
Start: 2019-05-24

## 2019-05-24 RX ORDER — RISPERIDONE 1 MG/1
1 TABLET, ORALLY DISINTEGRATING ORAL
Status: CANCELLED | OUTPATIENT
Start: 2019-05-24

## 2019-05-24 RX ORDER — RISPERIDONE 1 MG/1
1 TABLET, ORALLY DISINTEGRATING ORAL
Status: DISCONTINUED | OUTPATIENT
Start: 2019-05-24 | End: 2019-05-30 | Stop reason: HOSPADM

## 2019-05-24 RX ORDER — MAGNESIUM HYDROXIDE/ALUMINUM HYDROXICE/SIMETHICONE 120; 1200; 1200 MG/30ML; MG/30ML; MG/30ML
30 SUSPENSION ORAL EVERY 4 HOURS PRN
Status: CANCELLED | OUTPATIENT
Start: 2019-05-24

## 2019-05-24 RX ORDER — OLANZAPINE 10 MG/1
5 INJECTION, POWDER, LYOPHILIZED, FOR SOLUTION INTRAMUSCULAR EVERY 6 HOURS PRN
Status: CANCELLED | OUTPATIENT
Start: 2019-05-24

## 2019-05-24 RX ORDER — HYDROXYZINE HYDROCHLORIDE 25 MG/1
25 TABLET, FILM COATED ORAL EVERY 6 HOURS PRN
Status: DISCONTINUED | OUTPATIENT
Start: 2019-05-24 | End: 2019-05-30 | Stop reason: HOSPADM

## 2019-05-24 RX ORDER — BENZTROPINE MESYLATE 1 MG/1
1 TABLET ORAL EVERY 6 HOURS PRN
Status: DISCONTINUED | OUTPATIENT
Start: 2019-05-24 | End: 2019-05-30 | Stop reason: HOSPADM

## 2019-05-24 RX ORDER — LORAZEPAM 1 MG/1
1 TABLET ORAL EVERY 6 HOURS PRN
Status: DISCONTINUED | OUTPATIENT
Start: 2019-05-24 | End: 2019-05-25

## 2019-05-24 RX ORDER — ESCITALOPRAM OXALATE 10 MG/1
10 TABLET ORAL DAILY
Status: DISCONTINUED | OUTPATIENT
Start: 2019-05-25 | End: 2019-05-25

## 2019-05-24 RX ORDER — HALOPERIDOL 5 MG
5 TABLET ORAL EVERY 6 HOURS PRN
Status: CANCELLED | OUTPATIENT
Start: 2019-05-24

## 2019-05-24 RX ORDER — BENZTROPINE MESYLATE 1 MG/ML
1 INJECTION INTRAMUSCULAR; INTRAVENOUS EVERY 6 HOURS PRN
Status: DISCONTINUED | OUTPATIENT
Start: 2019-05-24 | End: 2019-05-30 | Stop reason: HOSPADM

## 2019-05-24 RX ORDER — LORAZEPAM 2 MG/ML
1 INJECTION INTRAMUSCULAR EVERY 6 HOURS PRN
Status: CANCELLED | OUTPATIENT
Start: 2019-05-24

## 2019-05-24 RX ORDER — ACETAMINOPHEN 325 MG/1
650 TABLET ORAL EVERY 6 HOURS PRN
Status: CANCELLED | OUTPATIENT
Start: 2019-05-24

## 2019-05-24 RX ORDER — MAGNESIUM HYDROXIDE/ALUMINUM HYDROXICE/SIMETHICONE 120; 1200; 1200 MG/30ML; MG/30ML; MG/30ML
30 SUSPENSION ORAL EVERY 4 HOURS PRN
Status: DISCONTINUED | OUTPATIENT
Start: 2019-05-24 | End: 2019-05-30 | Stop reason: HOSPADM

## 2019-05-24 RX ORDER — HALOPERIDOL 5 MG
5 TABLET ORAL EVERY 6 HOURS PRN
Status: DISCONTINUED | OUTPATIENT
Start: 2019-05-24 | End: 2019-05-30 | Stop reason: HOSPADM

## 2019-05-24 RX ORDER — ACETAMINOPHEN 325 MG/1
975 TABLET ORAL EVERY 6 HOURS PRN
Status: DISCONTINUED | OUTPATIENT
Start: 2019-05-24 | End: 2019-05-30 | Stop reason: HOSPADM

## 2019-05-24 RX ORDER — METOPROLOL TARTRATE 50 MG/1
50 TABLET, FILM COATED ORAL ONCE
Status: COMPLETED | OUTPATIENT
Start: 2019-05-24 | End: 2019-05-24

## 2019-05-24 RX ORDER — TRAZODONE HYDROCHLORIDE 50 MG/1
50 TABLET ORAL
Status: DISCONTINUED | OUTPATIENT
Start: 2019-05-24 | End: 2019-05-30 | Stop reason: HOSPADM

## 2019-05-24 RX ORDER — OLANZAPINE 5 MG/1
5 TABLET ORAL EVERY 6 HOURS PRN
Status: DISCONTINUED | OUTPATIENT
Start: 2019-05-24 | End: 2019-05-30 | Stop reason: HOSPADM

## 2019-05-24 RX ORDER — LORAZEPAM 2 MG/ML
1 INJECTION INTRAMUSCULAR EVERY 6 HOURS PRN
Status: DISCONTINUED | OUTPATIENT
Start: 2019-05-24 | End: 2019-05-30 | Stop reason: HOSPADM

## 2019-05-24 RX ORDER — TRAZODONE HYDROCHLORIDE 50 MG/1
50 TABLET ORAL
Status: CANCELLED | OUTPATIENT
Start: 2019-05-24

## 2019-05-24 RX ORDER — OLANZAPINE 10 MG/1
5 INJECTION, POWDER, LYOPHILIZED, FOR SOLUTION INTRAMUSCULAR EVERY 6 HOURS PRN
Status: DISCONTINUED | OUTPATIENT
Start: 2019-05-24 | End: 2019-05-28

## 2019-05-24 RX ORDER — ACETAMINOPHEN 325 MG/1
650 TABLET ORAL EVERY 4 HOURS PRN
Status: CANCELLED | OUTPATIENT
Start: 2019-05-24

## 2019-05-24 RX ORDER — HALOPERIDOL 5 MG/ML
5 INJECTION INTRAMUSCULAR EVERY 6 HOURS PRN
Status: CANCELLED | OUTPATIENT
Start: 2019-05-24

## 2019-05-24 RX ORDER — PANTOPRAZOLE SODIUM 40 MG/1
40 TABLET, DELAYED RELEASE ORAL
Status: CANCELLED | OUTPATIENT
Start: 2019-05-25

## 2019-05-24 RX ORDER — PANTOPRAZOLE SODIUM 40 MG/1
40 TABLET, DELAYED RELEASE ORAL
Status: DISCONTINUED | OUTPATIENT
Start: 2019-05-25 | End: 2019-05-30 | Stop reason: HOSPADM

## 2019-05-24 RX ORDER — HYDROXYZINE HYDROCHLORIDE 25 MG/1
25 TABLET, FILM COATED ORAL EVERY 6 HOURS PRN
Status: CANCELLED | OUTPATIENT
Start: 2019-05-24

## 2019-05-24 RX ORDER — LEVOTHYROXINE SODIUM 0.05 MG/1
50 TABLET ORAL
Status: DISCONTINUED | OUTPATIENT
Start: 2019-05-24 | End: 2019-05-24 | Stop reason: HOSPADM

## 2019-05-24 RX ADMIN — PANTOPRAZOLE SODIUM 40 MG: 40 TABLET, DELAYED RELEASE ORAL at 08:03

## 2019-05-24 RX ADMIN — LEVOTHYROXINE SODIUM 50 MCG: 50 TABLET ORAL at 08:03

## 2019-05-24 RX ADMIN — LISINOPRIL 20 MG: 20 TABLET ORAL at 08:03

## 2019-05-24 RX ADMIN — METOPROLOL TARTRATE 50 MG: 50 TABLET, FILM COATED ORAL at 12:49

## 2019-05-24 RX ADMIN — LORAZEPAM 1 MG: 1 TABLET ORAL at 08:03

## 2019-05-24 RX ADMIN — LORAZEPAM 1 MG: 1 TABLET ORAL at 02:06

## 2019-05-24 RX ADMIN — LORAZEPAM 1 MG: 1 TABLET ORAL at 18:26

## 2019-05-24 RX ADMIN — LORAZEPAM 1 MG: 1 TABLET ORAL at 12:49

## 2019-05-24 RX ADMIN — ESCITALOPRAM OXALATE 10 MG: 10 TABLET ORAL at 08:54

## 2019-05-25 PROBLEM — F31.4 BIPOLAR AFFECTIVE DISORDER, DEPRESSED, SEVERE (HCC): Status: ACTIVE | Noted: 2019-05-25

## 2019-05-25 PROBLEM — F43.10 PTSD (POST-TRAUMATIC STRESS DISORDER): Status: ACTIVE | Noted: 2019-05-25

## 2019-05-25 PROBLEM — F10.20 ALCOHOL USE DISORDER, SEVERE, DEPENDENCE (HCC): Status: ACTIVE | Noted: 2019-05-07

## 2019-05-25 PROBLEM — R56.9 SEIZURE (HCC): Status: ACTIVE | Noted: 2019-05-25

## 2019-05-25 LAB
AMPHETAMINES SERPL QL SCN: NEGATIVE
BARBITURATES UR QL: NEGATIVE
BASOPHILS # BLD AUTO: 0.01 THOUSANDS/ΜL (ref 0–0.1)
BASOPHILS NFR BLD AUTO: 0 % (ref 0–1)
BENZODIAZ UR QL: POSITIVE
COCAINE UR QL: NEGATIVE
EOSINOPHIL # BLD AUTO: 0.12 THOUSAND/ΜL (ref 0–0.61)
EOSINOPHIL NFR BLD AUTO: 3 % (ref 0–6)
ERYTHROCYTE [DISTWIDTH] IN BLOOD BY AUTOMATED COUNT: 12.4 % (ref 11.6–15.1)
HCT VFR BLD AUTO: 36.7 % (ref 34.8–46.1)
HGB BLD-MCNC: 12.8 G/DL (ref 11.5–15.4)
IMM GRANULOCYTES # BLD AUTO: 0.01 THOUSAND/UL (ref 0–0.2)
IMM GRANULOCYTES NFR BLD AUTO: 0 % (ref 0–2)
LYMPHOCYTES # BLD AUTO: 1.68 THOUSANDS/ΜL (ref 0.6–4.47)
LYMPHOCYTES NFR BLD AUTO: 39 % (ref 14–44)
MCH RBC QN AUTO: 33 PG (ref 26.8–34.3)
MCHC RBC AUTO-ENTMCNC: 34.9 G/DL (ref 31.4–37.4)
MCV RBC AUTO: 95 FL (ref 82–98)
METHADONE UR QL: NEGATIVE
MONOCYTES # BLD AUTO: 0.68 THOUSAND/ΜL (ref 0.17–1.22)
MONOCYTES NFR BLD AUTO: 16 % (ref 4–12)
NEUTROPHILS # BLD AUTO: 1.83 THOUSANDS/ΜL (ref 1.85–7.62)
NEUTS SEG NFR BLD AUTO: 42 % (ref 43–75)
NRBC BLD AUTO-RTO: 0 /100 WBCS
OPIATES UR QL SCN: NEGATIVE
PCP UR QL: NEGATIVE
PLATELET # BLD AUTO: 270 THOUSANDS/UL (ref 149–390)
PMV BLD AUTO: 10 FL (ref 8.9–12.7)
RBC # BLD AUTO: 3.88 MILLION/UL (ref 3.81–5.12)
THC UR QL: NEGATIVE
WBC # BLD AUTO: 4.33 THOUSAND/UL (ref 4.31–10.16)

## 2019-05-25 PROCEDURE — 80307 DRUG TEST PRSMV CHEM ANLYZR: CPT | Performed by: NURSE PRACTITIONER

## 2019-05-25 PROCEDURE — 99222 1ST HOSP IP/OBS MODERATE 55: CPT | Performed by: PSYCHIATRY & NEUROLOGY

## 2019-05-25 PROCEDURE — 99222 1ST HOSP IP/OBS MODERATE 55: CPT | Performed by: PHYSICIAN ASSISTANT

## 2019-05-25 PROCEDURE — 85025 COMPLETE CBC W/AUTO DIFF WBC: CPT | Performed by: NURSE PRACTITIONER

## 2019-05-25 RX ORDER — ATORVASTATIN CALCIUM 40 MG/1
40 TABLET, FILM COATED ORAL
Status: DISCONTINUED | OUTPATIENT
Start: 2019-05-25 | End: 2019-05-30 | Stop reason: HOSPADM

## 2019-05-25 RX ORDER — SUCRALFATE 1 G/1
1 TABLET ORAL
Status: DISCONTINUED | OUTPATIENT
Start: 2019-05-25 | End: 2019-05-30 | Stop reason: HOSPADM

## 2019-05-25 RX ORDER — LISINOPRIL AND HYDROCHLOROTHIAZIDE 25; 20 MG/1; MG/1
1 TABLET ORAL DAILY
Status: ON HOLD | COMMUNITY
Start: 2017-12-14 | End: 2019-05-25

## 2019-05-25 RX ORDER — AMLODIPINE BESYLATE 5 MG/1
5 TABLET ORAL DAILY
Status: DISCONTINUED | OUTPATIENT
Start: 2019-05-25 | End: 2019-05-30 | Stop reason: HOSPADM

## 2019-05-25 RX ORDER — TRAZODONE HYDROCHLORIDE 50 MG/1
50 TABLET ORAL
Status: DISCONTINUED | OUTPATIENT
Start: 2019-05-25 | End: 2019-05-25

## 2019-05-25 RX ORDER — METOPROLOL TARTRATE 50 MG/1
50 TABLET, FILM COATED ORAL EVERY 12 HOURS
Status: DISCONTINUED | OUTPATIENT
Start: 2019-05-25 | End: 2019-05-25

## 2019-05-25 RX ORDER — PANTOPRAZOLE SODIUM 40 MG/1
40 TABLET, DELAYED RELEASE ORAL
Status: DISCONTINUED | OUTPATIENT
Start: 2019-05-25 | End: 2019-05-25 | Stop reason: SDUPTHER

## 2019-05-25 RX ORDER — LORAZEPAM 1 MG/1
1 TABLET ORAL EVERY 2 HOUR PRN
Status: DISCONTINUED | OUTPATIENT
Start: 2019-05-25 | End: 2019-05-30 | Stop reason: HOSPADM

## 2019-05-25 RX ORDER — METOPROLOL TARTRATE 50 MG/1
50 TABLET, FILM COATED ORAL EVERY 12 HOURS SCHEDULED
Status: DISCONTINUED | OUTPATIENT
Start: 2019-05-25 | End: 2019-05-30 | Stop reason: HOSPADM

## 2019-05-25 RX ORDER — LEVOTHYROXINE SODIUM 0.03 MG/1
50 TABLET ORAL DAILY
Status: DISCONTINUED | OUTPATIENT
Start: 2019-05-25 | End: 2019-05-30 | Stop reason: HOSPADM

## 2019-05-25 RX ORDER — GABAPENTIN 300 MG/1
300 CAPSULE ORAL 3 TIMES DAILY
Status: DISCONTINUED | OUTPATIENT
Start: 2019-05-25 | End: 2019-05-30 | Stop reason: HOSPADM

## 2019-05-25 RX ORDER — NICOTINE 21 MG/24HR
1 PATCH, TRANSDERMAL 24 HOURS TRANSDERMAL DAILY
Status: DISCONTINUED | OUTPATIENT
Start: 2019-05-25 | End: 2019-05-30 | Stop reason: HOSPADM

## 2019-05-25 RX ORDER — ROSUVASTATIN CALCIUM 20 MG/1
20 TABLET, COATED ORAL DAILY
COMMUNITY
Start: 2017-12-14 | End: 2019-09-26 | Stop reason: HOSPADM

## 2019-05-25 RX ADMIN — GABAPENTIN 300 MG: 300 CAPSULE ORAL at 12:36

## 2019-05-25 RX ADMIN — NICOTINE 1 PATCH: 14 PATCH TRANSDERMAL at 12:36

## 2019-05-25 RX ADMIN — HYDROCHLOROTHIAZIDE: 25 TABLET ORAL at 12:35

## 2019-05-25 RX ADMIN — SUCRALFATE 1 G: 1 TABLET ORAL at 21:10

## 2019-05-25 RX ADMIN — ATORVASTATIN CALCIUM 40 MG: 40 TABLET, FILM COATED ORAL at 17:02

## 2019-05-25 RX ADMIN — METOPROLOL TARTRATE 50 MG: 50 TABLET, FILM COATED ORAL at 21:51

## 2019-05-25 RX ADMIN — LEVOTHYROXINE SODIUM 50 MCG: 25 TABLET ORAL at 12:36

## 2019-05-25 RX ADMIN — LORAZEPAM 1 MG: 1 TABLET ORAL at 14:46

## 2019-05-25 RX ADMIN — ESCITALOPRAM OXALATE 10 MG: 10 TABLET ORAL at 08:24

## 2019-05-25 RX ADMIN — METOPROLOL TARTRATE 50 MG: 50 TABLET, FILM COATED ORAL at 12:36

## 2019-05-25 RX ADMIN — GABAPENTIN 300 MG: 300 CAPSULE ORAL at 21:10

## 2019-05-25 RX ADMIN — AMLODIPINE BESYLATE 5 MG: 5 TABLET ORAL at 12:36

## 2019-05-25 RX ADMIN — LORAZEPAM 1 MG: 1 TABLET ORAL at 08:26

## 2019-05-25 RX ADMIN — PANTOPRAZOLE SODIUM 40 MG: 40 TABLET, DELAYED RELEASE ORAL at 05:59

## 2019-05-25 RX ADMIN — GABAPENTIN 300 MG: 300 CAPSULE ORAL at 17:02

## 2019-05-25 RX ADMIN — LURASIDONE HYDROCHLORIDE 20 MG: 20 TABLET, FILM COATED ORAL at 17:02

## 2019-05-25 RX ADMIN — SUCRALFATE 1 G: 1 TABLET ORAL at 15:45

## 2019-05-26 LAB
ALBUMIN SERPL BCP-MCNC: 3.3 G/DL (ref 3.5–5)
ALP SERPL-CCNC: 85 U/L (ref 46–116)
ALT SERPL W P-5'-P-CCNC: 89 U/L (ref 12–78)
ANION GAP SERPL CALCULATED.3IONS-SCNC: 10 MMOL/L (ref 4–13)
AST SERPL W P-5'-P-CCNC: 26 U/L (ref 5–45)
BILIRUB SERPL-MCNC: 0.6 MG/DL (ref 0.2–1)
BUN SERPL-MCNC: 9 MG/DL (ref 5–25)
CALCIUM SERPL-MCNC: 9.2 MG/DL (ref 8.3–10.1)
CHLORIDE SERPL-SCNC: 105 MMOL/L (ref 100–108)
CHOLEST SERPL-MCNC: 174 MG/DL (ref 50–200)
CO2 SERPL-SCNC: 28 MMOL/L (ref 21–32)
CREAT SERPL-MCNC: 0.84 MG/DL (ref 0.6–1.3)
EST. AVERAGE GLUCOSE BLD GHB EST-MCNC: 114 MG/DL
GFR SERPL CREATININE-BSD FRML MDRD: 81 ML/MIN/1.73SQ M
GLUCOSE P FAST SERPL-MCNC: 120 MG/DL (ref 65–99)
GLUCOSE SERPL-MCNC: 120 MG/DL (ref 65–140)
HBA1C MFR BLD: 5.6 % (ref 4.2–6.3)
HDLC SERPL-MCNC: 58 MG/DL (ref 40–60)
LDLC SERPL CALC-MCNC: 94 MG/DL (ref 0–100)
NONHDLC SERPL-MCNC: 116 MG/DL
POTASSIUM SERPL-SCNC: 3.8 MMOL/L (ref 3.5–5.3)
PROT SERPL-MCNC: 6.8 G/DL (ref 6.4–8.2)
RPR SER QL: NORMAL
SODIUM SERPL-SCNC: 143 MMOL/L (ref 136–145)
T4 FREE SERPL-MCNC: 0.87 NG/DL (ref 0.76–1.46)
TRIGL SERPL-MCNC: 112 MG/DL
TSH SERPL DL<=0.05 MIU/L-ACNC: 4 UIU/ML (ref 0.36–3.74)

## 2019-05-26 PROCEDURE — 84439 ASSAY OF FREE THYROXINE: CPT | Performed by: PSYCHIATRY & NEUROLOGY

## 2019-05-26 PROCEDURE — 83036 HEMOGLOBIN GLYCOSYLATED A1C: CPT | Performed by: PSYCHIATRY & NEUROLOGY

## 2019-05-26 PROCEDURE — 80053 COMPREHEN METABOLIC PANEL: CPT | Performed by: PSYCHIATRY & NEUROLOGY

## 2019-05-26 PROCEDURE — 99232 SBSQ HOSP IP/OBS MODERATE 35: CPT | Performed by: PSYCHIATRY & NEUROLOGY

## 2019-05-26 PROCEDURE — 86592 SYPHILIS TEST NON-TREP QUAL: CPT | Performed by: PSYCHIATRY & NEUROLOGY

## 2019-05-26 PROCEDURE — 80061 LIPID PANEL: CPT | Performed by: PSYCHIATRY & NEUROLOGY

## 2019-05-26 PROCEDURE — 84443 ASSAY THYROID STIM HORMONE: CPT | Performed by: PSYCHIATRY & NEUROLOGY

## 2019-05-26 RX ORDER — NALTREXONE HYDROCHLORIDE 50 MG/1
50 TABLET, FILM COATED ORAL DAILY
Status: DISCONTINUED | OUTPATIENT
Start: 2019-05-26 | End: 2019-05-30 | Stop reason: HOSPADM

## 2019-05-26 RX ADMIN — METOPROLOL TARTRATE 50 MG: 50 TABLET, FILM COATED ORAL at 21:20

## 2019-05-26 RX ADMIN — METOPROLOL TARTRATE 50 MG: 50 TABLET, FILM COATED ORAL at 09:11

## 2019-05-26 RX ADMIN — NALTREXONE HYDROCHLORIDE 50 MG: 50 TABLET, FILM COATED ORAL at 13:41

## 2019-05-26 RX ADMIN — LORAZEPAM 1 MG: 1 TABLET ORAL at 14:06

## 2019-05-26 RX ADMIN — SUCRALFATE 1 G: 1 TABLET ORAL at 21:20

## 2019-05-26 RX ADMIN — LURASIDONE HYDROCHLORIDE 20 MG: 20 TABLET, FILM COATED ORAL at 15:31

## 2019-05-26 RX ADMIN — GABAPENTIN 300 MG: 300 CAPSULE ORAL at 09:11

## 2019-05-26 RX ADMIN — GABAPENTIN 300 MG: 300 CAPSULE ORAL at 15:31

## 2019-05-26 RX ADMIN — SUCRALFATE 1 G: 1 TABLET ORAL at 06:42

## 2019-05-26 RX ADMIN — HYDROXYZINE HYDROCHLORIDE 25 MG: 25 TABLET ORAL at 11:20

## 2019-05-26 RX ADMIN — HYDROCHLOROTHIAZIDE: 25 TABLET ORAL at 09:12

## 2019-05-26 RX ADMIN — NICOTINE 1 PATCH: 14 PATCH TRANSDERMAL at 09:13

## 2019-05-26 RX ADMIN — LEVOTHYROXINE SODIUM 50 MCG: 25 TABLET ORAL at 09:15

## 2019-05-26 RX ADMIN — SUCRALFATE 1 G: 1 TABLET ORAL at 15:31

## 2019-05-26 RX ADMIN — ATORVASTATIN CALCIUM 40 MG: 40 TABLET, FILM COATED ORAL at 15:31

## 2019-05-26 RX ADMIN — AMLODIPINE BESYLATE 5 MG: 5 TABLET ORAL at 09:13

## 2019-05-26 RX ADMIN — GABAPENTIN 300 MG: 300 CAPSULE ORAL at 21:20

## 2019-05-26 RX ADMIN — PANTOPRAZOLE SODIUM 40 MG: 40 TABLET, DELAYED RELEASE ORAL at 06:01

## 2019-05-27 PROCEDURE — 99231 SBSQ HOSP IP/OBS SF/LOW 25: CPT | Performed by: PSYCHIATRY & NEUROLOGY

## 2019-05-27 RX ORDER — CLONIDINE HYDROCHLORIDE 0.1 MG/1
0.1 TABLET ORAL 2 TIMES DAILY
Status: DISCONTINUED | OUTPATIENT
Start: 2019-05-27 | End: 2019-05-30 | Stop reason: HOSPADM

## 2019-05-27 RX ADMIN — LURASIDONE HYDROCHLORIDE 40 MG: 40 TABLET, FILM COATED ORAL at 15:45

## 2019-05-27 RX ADMIN — PANTOPRAZOLE SODIUM 40 MG: 40 TABLET, DELAYED RELEASE ORAL at 06:17

## 2019-05-27 RX ADMIN — SUCRALFATE 1 G: 1 TABLET ORAL at 12:05

## 2019-05-27 RX ADMIN — METOPROLOL TARTRATE 50 MG: 50 TABLET, FILM COATED ORAL at 08:14

## 2019-05-27 RX ADMIN — GABAPENTIN 300 MG: 300 CAPSULE ORAL at 08:13

## 2019-05-27 RX ADMIN — GABAPENTIN 300 MG: 300 CAPSULE ORAL at 15:45

## 2019-05-27 RX ADMIN — NALTREXONE HYDROCHLORIDE 50 MG: 50 TABLET, FILM COATED ORAL at 08:14

## 2019-05-27 RX ADMIN — SUCRALFATE 1 G: 1 TABLET ORAL at 21:01

## 2019-05-27 RX ADMIN — NICOTINE 1 PATCH: 14 PATCH TRANSDERMAL at 08:13

## 2019-05-27 RX ADMIN — GABAPENTIN 300 MG: 300 CAPSULE ORAL at 21:01

## 2019-05-27 RX ADMIN — CLONIDINE HYDROCHLORIDE 0.1 MG: 0.1 TABLET ORAL at 17:45

## 2019-05-27 RX ADMIN — SUCRALFATE 1 G: 1 TABLET ORAL at 08:13

## 2019-05-27 RX ADMIN — HYDROCHLOROTHIAZIDE: 25 TABLET ORAL at 08:13

## 2019-05-27 RX ADMIN — CLONIDINE HYDROCHLORIDE 0.1 MG: 0.1 TABLET ORAL at 11:08

## 2019-05-27 RX ADMIN — SUCRALFATE 1 G: 1 TABLET ORAL at 15:44

## 2019-05-27 RX ADMIN — ATORVASTATIN CALCIUM 40 MG: 40 TABLET, FILM COATED ORAL at 15:45

## 2019-05-27 RX ADMIN — LEVOTHYROXINE SODIUM 50 MCG: 25 TABLET ORAL at 08:15

## 2019-05-27 RX ADMIN — AMLODIPINE BESYLATE 5 MG: 5 TABLET ORAL at 08:13

## 2019-05-28 PROCEDURE — 99232 SBSQ HOSP IP/OBS MODERATE 35: CPT | Performed by: PSYCHIATRY & NEUROLOGY

## 2019-05-28 RX ORDER — OLANZAPINE 10 MG/1
5 INJECTION, POWDER, LYOPHILIZED, FOR SOLUTION INTRAMUSCULAR EVERY 6 HOURS PRN
Status: DISCONTINUED | OUTPATIENT
Start: 2019-05-28 | End: 2019-05-30 | Stop reason: HOSPADM

## 2019-05-28 RX ADMIN — CLONIDINE HYDROCHLORIDE 0.1 MG: 0.1 TABLET ORAL at 09:19

## 2019-05-28 RX ADMIN — METOPROLOL TARTRATE 50 MG: 50 TABLET, FILM COATED ORAL at 09:19

## 2019-05-28 RX ADMIN — SUCRALFATE 1 G: 1 TABLET ORAL at 21:12

## 2019-05-28 RX ADMIN — CLONIDINE HYDROCHLORIDE 0.1 MG: 0.1 TABLET ORAL at 18:14

## 2019-05-28 RX ADMIN — LURASIDONE HYDROCHLORIDE 40 MG: 40 TABLET, FILM COATED ORAL at 15:52

## 2019-05-28 RX ADMIN — AMLODIPINE BESYLATE 5 MG: 5 TABLET ORAL at 09:18

## 2019-05-28 RX ADMIN — GABAPENTIN 300 MG: 300 CAPSULE ORAL at 15:52

## 2019-05-28 RX ADMIN — NICOTINE 1 PATCH: 14 PATCH TRANSDERMAL at 09:16

## 2019-05-28 RX ADMIN — HYDROCHLOROTHIAZIDE: 25 TABLET ORAL at 09:18

## 2019-05-28 RX ADMIN — SUCRALFATE 1 G: 1 TABLET ORAL at 15:51

## 2019-05-28 RX ADMIN — GABAPENTIN 300 MG: 300 CAPSULE ORAL at 21:12

## 2019-05-28 RX ADMIN — ATORVASTATIN CALCIUM 40 MG: 40 TABLET, FILM COATED ORAL at 15:52

## 2019-05-28 RX ADMIN — LEVOTHYROXINE SODIUM 50 MCG: 25 TABLET ORAL at 09:18

## 2019-05-28 RX ADMIN — SUCRALFATE 1 G: 1 TABLET ORAL at 06:11

## 2019-05-28 RX ADMIN — NALTREXONE HYDROCHLORIDE 50 MG: 50 TABLET, FILM COATED ORAL at 09:18

## 2019-05-28 RX ADMIN — METOPROLOL TARTRATE 50 MG: 50 TABLET, FILM COATED ORAL at 21:11

## 2019-05-28 RX ADMIN — SUCRALFATE 1 G: 1 TABLET ORAL at 12:20

## 2019-05-28 RX ADMIN — PANTOPRAZOLE SODIUM 40 MG: 40 TABLET, DELAYED RELEASE ORAL at 06:11

## 2019-05-28 RX ADMIN — GABAPENTIN 300 MG: 300 CAPSULE ORAL at 09:19

## 2019-05-29 PROCEDURE — 99232 SBSQ HOSP IP/OBS MODERATE 35: CPT | Performed by: PSYCHIATRY & NEUROLOGY

## 2019-05-29 RX ADMIN — SUCRALFATE 1 G: 1 TABLET ORAL at 16:13

## 2019-05-29 RX ADMIN — LEVOTHYROXINE SODIUM 50 MCG: 25 TABLET ORAL at 08:11

## 2019-05-29 RX ADMIN — HYDROCHLOROTHIAZIDE: 25 TABLET ORAL at 08:12

## 2019-05-29 RX ADMIN — GABAPENTIN 300 MG: 300 CAPSULE ORAL at 16:13

## 2019-05-29 RX ADMIN — METOPROLOL TARTRATE 50 MG: 50 TABLET, FILM COATED ORAL at 21:45

## 2019-05-29 RX ADMIN — SUCRALFATE 1 G: 1 TABLET ORAL at 11:29

## 2019-05-29 RX ADMIN — ATORVASTATIN CALCIUM 40 MG: 40 TABLET, FILM COATED ORAL at 16:13

## 2019-05-29 RX ADMIN — NICOTINE 1 PATCH: 14 PATCH TRANSDERMAL at 08:13

## 2019-05-29 RX ADMIN — LURASIDONE HYDROCHLORIDE 40 MG: 40 TABLET, FILM COATED ORAL at 16:13

## 2019-05-29 RX ADMIN — METOPROLOL TARTRATE 50 MG: 50 TABLET, FILM COATED ORAL at 08:12

## 2019-05-29 RX ADMIN — CLONIDINE HYDROCHLORIDE 0.1 MG: 0.1 TABLET ORAL at 08:12

## 2019-05-29 RX ADMIN — SUCRALFATE 1 G: 1 TABLET ORAL at 21:45

## 2019-05-29 RX ADMIN — AMLODIPINE BESYLATE 5 MG: 5 TABLET ORAL at 08:12

## 2019-05-29 RX ADMIN — NALTREXONE HYDROCHLORIDE 50 MG: 50 TABLET, FILM COATED ORAL at 08:12

## 2019-05-29 RX ADMIN — PANTOPRAZOLE SODIUM 40 MG: 40 TABLET, DELAYED RELEASE ORAL at 06:07

## 2019-05-29 RX ADMIN — GABAPENTIN 300 MG: 300 CAPSULE ORAL at 21:46

## 2019-05-29 RX ADMIN — SUCRALFATE 1 G: 1 TABLET ORAL at 06:07

## 2019-05-29 RX ADMIN — GABAPENTIN 300 MG: 300 CAPSULE ORAL at 08:12

## 2019-05-30 VITALS
BODY MASS INDEX: 31.22 KG/M2 | DIASTOLIC BLOOD PRESSURE: 72 MMHG | WEIGHT: 176.2 LBS | HEIGHT: 63 IN | SYSTOLIC BLOOD PRESSURE: 135 MMHG | OXYGEN SATURATION: 96 % | TEMPERATURE: 97.8 F | HEART RATE: 66 BPM | RESPIRATION RATE: 16 BRPM

## 2019-05-30 PROCEDURE — 99239 HOSP IP/OBS DSCHRG MGMT >30: CPT | Performed by: PSYCHIATRY & NEUROLOGY

## 2019-05-30 RX ORDER — GABAPENTIN 300 MG/1
300 CAPSULE ORAL 3 TIMES DAILY
Qty: 90 CAPSULE | Refills: 1 | Status: SHIPPED | OUTPATIENT
Start: 2019-05-30 | End: 2019-09-26 | Stop reason: HOSPADM

## 2019-05-30 RX ORDER — NALTREXONE HYDROCHLORIDE 50 MG/1
50 TABLET, FILM COATED ORAL DAILY
Qty: 30 TABLET | Refills: 1 | Status: SHIPPED | OUTPATIENT
Start: 2019-05-31 | End: 2019-09-26 | Stop reason: HOSPADM

## 2019-05-30 RX ORDER — CLONIDINE HYDROCHLORIDE 0.1 MG/1
0.1 TABLET ORAL 2 TIMES DAILY
Refills: 0
Start: 2019-05-30 | End: 2019-05-30

## 2019-05-30 RX ORDER — CLONIDINE HYDROCHLORIDE 0.1 MG/1
0.1 TABLET ORAL 2 TIMES DAILY
Qty: 60 TABLET | Refills: 1 | Status: SHIPPED | OUTPATIENT
Start: 2019-05-30 | End: 2019-09-26 | Stop reason: HOSPADM

## 2019-05-30 RX ADMIN — HYDROCHLOROTHIAZIDE: 25 TABLET ORAL at 08:44

## 2019-05-30 RX ADMIN — ALUMINUM HYDROXIDE, MAGNESIUM HYDROXIDE, AND SIMETHICONE 30 ML: 200; 200; 20 SUSPENSION ORAL at 03:03

## 2019-05-30 RX ADMIN — SUCRALFATE 1 G: 1 TABLET ORAL at 06:02

## 2019-05-30 RX ADMIN — LEVOTHYROXINE SODIUM 50 MCG: 25 TABLET ORAL at 08:46

## 2019-05-30 RX ADMIN — NICOTINE 1 PATCH: 14 PATCH TRANSDERMAL at 08:46

## 2019-05-30 RX ADMIN — CLONIDINE HYDROCHLORIDE 0.1 MG: 0.1 TABLET ORAL at 08:44

## 2019-05-30 RX ADMIN — AMLODIPINE BESYLATE 5 MG: 5 TABLET ORAL at 08:44

## 2019-05-30 RX ADMIN — GABAPENTIN 300 MG: 300 CAPSULE ORAL at 08:45

## 2019-05-30 RX ADMIN — PANTOPRAZOLE SODIUM 40 MG: 40 TABLET, DELAYED RELEASE ORAL at 05:54

## 2019-05-30 RX ADMIN — NALTREXONE HYDROCHLORIDE 50 MG: 50 TABLET, FILM COATED ORAL at 08:45

## 2019-05-30 RX ADMIN — METOPROLOL TARTRATE 50 MG: 50 TABLET, FILM COATED ORAL at 08:45

## 2019-09-06 ENCOUNTER — HOSPITAL ENCOUNTER (EMERGENCY)
Facility: HOSPITAL | Age: 54
Discharge: HOME | End: 2019-09-06
Attending: EMERGENCY MEDICINE | Admitting: EMERGENCY MEDICINE
Payer: MEDICARE

## 2019-09-06 VITALS
SYSTOLIC BLOOD PRESSURE: 155 MMHG | BODY MASS INDEX: 30.93 KG/M2 | DIASTOLIC BLOOD PRESSURE: 90 MMHG | HEART RATE: 64 BPM | RESPIRATION RATE: 16 BRPM | WEIGHT: 174.6 LBS | TEMPERATURE: 98 F | OXYGEN SATURATION: 95 %

## 2019-09-06 DIAGNOSIS — F10.10 ALCOHOL ABUSE: Primary | ICD-10-CM

## 2019-09-06 LAB
ALBUMIN SERPL BCP-MCNC: 3.7 G/DL (ref 3.5–5)
ALP SERPL-CCNC: 87 U/L (ref 46–116)
ALT SERPL W P-5'-P-CCNC: 34 U/L (ref 12–78)
AMPHETAMINES SERPL QL SCN: NEGATIVE
ANION GAP SERPL CALCULATED.3IONS-SCNC: 14 MMOL/L (ref 4–13)
APAP SERPL-MCNC: <2 UG/ML (ref 10–20)
AST SERPL W P-5'-P-CCNC: 20 U/L (ref 5–45)
BACTERIA UR QL AUTO: ABNORMAL /HPF
BARBITURATES UR QL: NEGATIVE
BASOPHILS # BLD AUTO: 0.02 THOUSANDS/ΜL (ref 0–0.1)
BASOPHILS NFR BLD AUTO: 0 % (ref 0–1)
BENZODIAZ UR QL: NEGATIVE
BILIRUB SERPL-MCNC: 0.25 MG/DL (ref 0.2–1)
BILIRUB UR QL STRIP: NEGATIVE
BUN SERPL-MCNC: 13 MG/DL (ref 5–25)
CALCIUM SERPL-MCNC: 8.9 MG/DL (ref 8.3–10.1)
CHLORIDE SERPL-SCNC: 102 MMOL/L (ref 100–108)
CLARITY UR: CLEAR
CLARITY, POC: CLEAR
CO2 SERPL-SCNC: 25 MMOL/L (ref 21–32)
COCAINE UR QL: NEGATIVE
COLOR UR: YELLOW
COLOR, POC: YELLOW
CREAT SERPL-MCNC: 0.74 MG/DL (ref 0.6–1.3)
EOSINOPHIL # BLD AUTO: 0.01 THOUSAND/ΜL (ref 0–0.61)
EOSINOPHIL NFR BLD AUTO: 0 % (ref 0–6)
ERYTHROCYTE [DISTWIDTH] IN BLOOD BY AUTOMATED COUNT: 13.2 % (ref 11.6–15.1)
ETHANOL EXG-MCNC: 0.07 MG/DL
ETHANOL SERPL-MCNC: 222 MG/DL (ref 0–3)
FINE GRAN CASTS URNS QL MICRO: ABNORMAL /LPF
GFR SERPL CREATININE-BSD FRML MDRD: 94 ML/MIN/1.73SQ M
GLUCOSE SERPL-MCNC: 143 MG/DL (ref 65–140)
GLUCOSE UR STRIP-MCNC: NEGATIVE MG/DL
HCT VFR BLD AUTO: 44.7 % (ref 34.8–46.1)
HGB BLD-MCNC: 14.8 G/DL (ref 11.5–15.4)
HGB UR QL STRIP.AUTO: NEGATIVE
IMM GRANULOCYTES # BLD AUTO: 0.07 THOUSAND/UL (ref 0–0.2)
IMM GRANULOCYTES NFR BLD AUTO: 1 % (ref 0–2)
KETONES UR STRIP-MCNC: NEGATIVE MG/DL
LEUKOCYTE ESTERASE UR QL STRIP: NEGATIVE
LIPASE SERPL-CCNC: 101 U/L (ref 73–393)
LYMPHOCYTES # BLD AUTO: 2.14 THOUSANDS/ΜL (ref 0.6–4.47)
LYMPHOCYTES NFR BLD AUTO: 27 % (ref 14–44)
MCH RBC QN AUTO: 32.2 PG (ref 26.8–34.3)
MCHC RBC AUTO-ENTMCNC: 33.1 G/DL (ref 31.4–37.4)
MCV RBC AUTO: 97 FL (ref 82–98)
METHADONE UR QL: NEGATIVE
MONOCYTES # BLD AUTO: 0.57 THOUSAND/ΜL (ref 0.17–1.22)
MONOCYTES NFR BLD AUTO: 7 % (ref 4–12)
NEUTROPHILS # BLD AUTO: 5.24 THOUSANDS/ΜL (ref 1.85–7.62)
NEUTS SEG NFR BLD AUTO: 65 % (ref 43–75)
NITRITE UR QL STRIP: NEGATIVE
NON-SQ EPI CELLS URNS QL MICRO: ABNORMAL /HPF
NRBC BLD AUTO-RTO: 0 /100 WBCS
OPIATES UR QL SCN: NEGATIVE
PCP UR QL: NEGATIVE
PH UR STRIP.AUTO: 5.5 [PH] (ref 4.5–8)
PLATELET # BLD AUTO: 288 THOUSANDS/UL (ref 149–390)
PMV BLD AUTO: 8.9 FL (ref 8.9–12.7)
POTASSIUM SERPL-SCNC: 3.7 MMOL/L (ref 3.5–5.3)
PROT SERPL-MCNC: 7.2 G/DL (ref 6.4–8.2)
PROT UR STRIP-MCNC: ABNORMAL MG/DL
RBC # BLD AUTO: 4.6 MILLION/UL (ref 3.81–5.12)
RBC #/AREA URNS AUTO: ABNORMAL /HPF
SALICYLATES SERPL-MCNC: 4.5 MG/DL (ref 3–20)
SODIUM SERPL-SCNC: 141 MMOL/L (ref 136–145)
SP GR UR STRIP.AUTO: >=1.03 (ref 1–1.03)
THC UR QL: NEGATIVE
UROBILINOGEN UR QL STRIP.AUTO: 0.2 E.U./DL
WBC # BLD AUTO: 8.05 THOUSAND/UL (ref 4.31–10.16)
WBC #/AREA URNS AUTO: ABNORMAL /HPF

## 2019-09-06 PROCEDURE — 81001 URINALYSIS AUTO W/SCOPE: CPT

## 2019-09-06 PROCEDURE — 36415 COLL VENOUS BLD VENIPUNCTURE: CPT | Performed by: EMERGENCY MEDICINE

## 2019-09-06 PROCEDURE — 80053 COMPREHEN METABOLIC PANEL: CPT | Performed by: EMERGENCY MEDICINE

## 2019-09-06 PROCEDURE — 99284 EMERGENCY DEPT VISIT MOD MDM: CPT

## 2019-09-06 PROCEDURE — 80320 DRUG SCREEN QUANTALCOHOLS: CPT | Performed by: EMERGENCY MEDICINE

## 2019-09-06 PROCEDURE — 80307 DRUG TEST PRSMV CHEM ANLYZR: CPT | Performed by: EMERGENCY MEDICINE

## 2019-09-06 PROCEDURE — 80329 ANALGESICS NON-OPIOID 1 OR 2: CPT | Performed by: EMERGENCY MEDICINE

## 2019-09-06 PROCEDURE — 83690 ASSAY OF LIPASE: CPT | Performed by: EMERGENCY MEDICINE

## 2019-09-06 PROCEDURE — 96361 HYDRATE IV INFUSION ADD-ON: CPT

## 2019-09-06 PROCEDURE — 99284 EMERGENCY DEPT VISIT MOD MDM: CPT | Performed by: EMERGENCY MEDICINE

## 2019-09-06 PROCEDURE — 96365 THER/PROPH/DIAG IV INF INIT: CPT

## 2019-09-06 PROCEDURE — 96375 TX/PRO/DX INJ NEW DRUG ADDON: CPT

## 2019-09-06 PROCEDURE — 85025 COMPLETE CBC W/AUTO DIFF WBC: CPT | Performed by: EMERGENCY MEDICINE

## 2019-09-06 PROCEDURE — 82075 ASSAY OF BREATH ETHANOL: CPT | Performed by: EMERGENCY MEDICINE

## 2019-09-06 PROCEDURE — 96367 TX/PROPH/DG ADDL SEQ IV INF: CPT

## 2019-09-06 RX ORDER — ONDANSETRON 2 MG/ML
4 INJECTION INTRAMUSCULAR; INTRAVENOUS ONCE
Status: COMPLETED | OUTPATIENT
Start: 2019-09-06 | End: 2019-09-06

## 2019-09-06 RX ORDER — LEVOTHYROXINE SODIUM 0.05 MG/1
50 TABLET ORAL
Status: DISCONTINUED | OUTPATIENT
Start: 2019-09-06 | End: 2019-09-06 | Stop reason: HOSPADM

## 2019-09-06 RX ORDER — ACETAMINOPHEN 325 MG/1
650 TABLET ORAL ONCE
Status: COMPLETED | OUTPATIENT
Start: 2019-09-06 | End: 2019-09-06

## 2019-09-06 RX ORDER — METOPROLOL TARTRATE 50 MG/1
50 TABLET, FILM COATED ORAL ONCE
Status: COMPLETED | OUTPATIENT
Start: 2019-09-06 | End: 2019-09-06

## 2019-09-06 RX ADMIN — FOLIC ACID 1 MG: 5 INJECTION, SOLUTION INTRAMUSCULAR; INTRAVENOUS; SUBCUTANEOUS at 05:54

## 2019-09-06 RX ADMIN — METOPROLOL TARTRATE 50 MG: 50 TABLET, FILM COATED ORAL at 08:47

## 2019-09-06 RX ADMIN — THIAMINE HYDROCHLORIDE 100 MG: 100 INJECTION, SOLUTION INTRAMUSCULAR; INTRAVENOUS at 05:05

## 2019-09-06 RX ADMIN — ACETAMINOPHEN 650 MG: 325 TABLET ORAL at 08:39

## 2019-09-06 RX ADMIN — ONDANSETRON 4 MG: 2 INJECTION INTRAMUSCULAR; INTRAVENOUS at 04:57

## 2019-09-06 RX ADMIN — SODIUM CHLORIDE 1000 ML: 0.9 INJECTION, SOLUTION INTRAVENOUS at 04:58

## 2019-09-06 RX ADMIN — LEVOTHYROXINE SODIUM 50 MCG: 50 TABLET ORAL at 08:47

## 2019-09-06 NOTE — ED NOTES
Patient stating "I take medications everyday but I don't know the names I just know what they look like"     Raiza Abrams RN  09/06/19 0489

## 2019-09-06 NOTE — ED CARE HANDOFF
Emergency Department Sign Out Note        Sign out and transfer of care from Dr Praneeth Moreno  See Separate Emergency Department note  The patient, Thomas Baca, was evaluated by the previous provider for alcohol intoxication    Workup Completed:  Labs, UA    ED Course / Workup Pending (followup):  BAT now 0 074  Pt wishes to be discharged home  Pt no longer wants to stay for crisis evaluation  Pt denies SI and HI  Pt expresses that she will follow up with her psychiatrist       Pt able to ambulate to the bathroom without difficulty  Tolerating PO  Will proceed with discharge home  Procedures  MDM    Disposition  Final diagnoses:   Alcohol abuse     Time reflects when diagnosis was documented in both MDM as applicable and the Disposition within this note     Time User Action Codes Description Comment    9/6/2019  5:37 AM Gael Roberts Add [F10 10] Alcohol abuse       ED Disposition     ED Disposition Condition Date/Time Comment    Discharge Stable Fri Sep 6, 2019 10:27 AM Thomas Baca discharge to home/self care              Follow-up Information     Follow up With Specialties Details Why [address]  [facility], [facility] Nurse Practitioner Schedule an appointment as soon as possible for a visit in 3 days For re-evaluation [address] [place] [id number] [phone]          Discharge Medication List as of 9/6/2019 10:29 AM      CONTINUE these medications which have NOT CHANGED    Details   levothyroxine 50 mcg tablet Take 50 mcg by mouth daily, Historical Med      metoprolol tartrate (LOPRESSOR) 50 mg tablet Take 1 tablet by mouth every 12 (twelve) hours, Historical Med      amLODIPine (NORVASC) 5 mg tablet Take 5 mg by mouth daily, Historical Med      cloNIDine (CATAPRES) 0 1 mg tablet Take 1 tablet (0 1 mg total) by mouth 2 (two) times a day At 9am and 6pm, Starting Thu 5/30/2019, Print      gabapentin (NEURONTIN) 300 mg capsule Take 1 capsule (300 mg total) by mouth 3 (three) times a day At 9am, 4pm, 9pm, Starting Thu 5/30/2019, Print      lisinopril 20 mg TABS 20 mg, hydrochlorothiazide 25 mg TABS 25 mg Take by mouth daily, Historical Med      lurasidone (LATUDA) 40 mg tablet Take 1 tablet (40 mg total) by mouth daily with dinner, Starting Thu 5/30/2019, Print      naltrexone (REVIA) 50 mg tablet Take 1 tablet (50 mg total) by mouth daily At 9am, Starting Fri 5/31/2019, Print      pantoprazole (PROTONIX) 40 mg tablet Take 1 tablet (40 mg total) by mouth daily Take 30 minutes before breakfast, Starting Wed 5/8/2019, Normal      rosuvastatin (CRESTOR) 20 MG tablet Take 20 mg by mouth daily, Starting Thu 12/14/2017, Historical Med      sucralfate (CARAFATE) 1 g tablet Take 1 tablet (1 g total) by mouth 4 (four) times a day (before meals and at bedtime) for 30 days, Starting Wed 5/8/2019, Until Fri 6/7/2019, Print           No discharge procedures on file         ED Provider  Electronically Signed by     Griselda Multani DO  09/06/19 8298

## 2019-09-06 NOTE — ED NOTES
Pt requesting to leave facility, Dr Mat Castañeda aware       Sherryle Court, MRAK ANTHONY  09/06/19 2100

## 2019-09-06 NOTE — ED NOTES
Pt reports that she is feeling anxious  Dr Gonzalo Baxter aware of same  No additional medications will be ordered at this time per Dr Gonzalo Baxter       Arthuro Sibley, RN  09/06/19 4569

## 2019-09-06 NOTE — ED PROVIDER NOTES
History  Chief Complaint   Patient presents with    Alcohol Problem     patient reports excessive alcohol abuse for approximately 3 weeks, drinking approx  a 6 pack per day  reports recent rehab then relapsed   Psychiatric Evaluation     patient states "I'm sad"  tearful in triage  quiet when asked psychiatric questions  reports suicide attempt >2 years ago  denies current SI, HI, AH, VH  Patient presents requesting alcohol rehab  States that she was in rehab back in May  Was discharged and was sober for about 3 months  States that she started drinking about 3 weeks ago  States that it is less than what she was before  Patient came in tonight because she is requesting rehab and is feeling that she is going through withdrawal already  Patient cannot tell me when her last drink was  She is denying any psychiatric complaints to me such as hallucinations, worsening depression, suicidal or homicidal ideation, intent or plan  States that she has chronic depression but this is unchanged  States that she had nausea and vomited once tonight  History provided by:  Patient and significant other   used: No    Alcohol Problem   Severity:  Moderate  Onset quality:  Gradual  Timing:  Constant  Progression:  Worsening  Chronicity:  Recurrent  Associated symptoms: nausea and vomiting    Associated symptoms: no abdominal pain, no chest pain, no fever, no headaches and no shortness of breath        Prior to Admission Medications   Prescriptions Last Dose Informant Patient Reported? Taking?    amLODIPine (NORVASC) 5 mg tablet   Yes Yes   Sig: Take 5 mg by mouth daily   cloNIDine (CATAPRES) 0 1 mg tablet   No Yes   Sig: Take 1 tablet (0 1 mg total) by mouth 2 (two) times a day At 9am and 6pm   gabapentin (NEURONTIN) 300 mg capsule   No Yes   Sig: Take 1 capsule (300 mg total) by mouth 3 (three) times a day At 9am, 4pm, 9pm   levothyroxine 50 mcg tablet   Yes Yes   Sig: Take 50 mcg by mouth daily   lisinopril 20 mg TABS 20 mg, hydrochlorothiazide 25 mg TABS 25 mg   Yes No   Sig: Take by mouth daily   lurasidone (LATUDA) 40 mg tablet   No No   Sig: Take 1 tablet (40 mg total) by mouth daily with dinner   metoprolol tartrate (LOPRESSOR) 50 mg tablet   Yes No   Sig: Take 1 tablet by mouth every 12 (twelve) hours   naltrexone (REVIA) 50 mg tablet   No No   Sig: Take 1 tablet (50 mg total) by mouth daily At 9am   pantoprazole (PROTONIX) 40 mg tablet   No No   Sig: Take 1 tablet (40 mg total) by mouth daily Take 30 minutes before breakfast   rosuvastatin (CRESTOR) 20 MG tablet   Yes No   Sig: Take 20 mg by mouth daily   sucralfate (CARAFATE) 1 g tablet   No No   Sig: Take 1 tablet (1 g total) by mouth 4 (four) times a day (before meals and at bedtime) for 30 days      Facility-Administered Medications: None       Past Medical History:   Diagnosis Date    Addiction [facility] drug ([facility] )     Alcoholism ([facility] )     Anxiety     Bowel obstruction (HCC)     Depression     Gastritis     Hypertension        Past Surgical History:   Procedure Laterality Date    ABDOMINAL SURGERY      APPENDECTOMY      BOWEL RESECTION      CHOLECYSTECTOMY      ESOPHAGOGASTRODUODENOSCOPY N/A 5/18/2018    Procedure: ESOPHAGOGASTRODUODENOSCOPY (EGD) with bx;  Surgeon: Jd Sanchez DO;  Location: [facility]; Service: Gastroenterology    HYSTERECTOMY      KNEE SURGERY Bilateral        History reviewed  No pertinent family history  I have reviewed and agree with the history as documented  Social History     Tobacco Use    Smoking status: Current Every Day Smoker     Packs/day: 0 50     Types: Cigarettes    Smokeless tobacco: Never Used   Substance Use Topics    Alcohol use: Yes     Frequency: 4 or more times a week     Drinks per session: 7 to 9     Binge frequency: Daily or almost daily     Comment: 8 large glasses of beer daily    Drug use: No        Review of Systems   Constitutional: Negative for fever  Respiratory: Negative for chest tightness and shortness of breath  Cardiovascular: Negative for chest pain and palpitations  Gastrointestinal: Positive for nausea and vomiting  Negative for abdominal pain, anal bleeding and blood in stool  Skin: Negative for color change and wound  Allergic/Immunologic: Negative for immunocompromised state  Neurological: Negative for dizziness, tremors, syncope, light-headedness and headaches  Psychiatric/Behavioral: Negative for agitation, hallucinations, self-injury and suicidal ideas  All other systems reviewed and are negative  Physical Exam  Physical Exam   Constitutional: She is oriented to person, place, and time  She appears well-developed and well-nourished  No distress  HENT:   Head: Normocephalic and atraumatic  Mouth/Throat: Oropharynx is clear and moist    Eyes: Pupils are equal, round, and reactive to light  Conjunctivae are normal    Neck: Normal range of motion  Neck supple  Cardiovascular: Regular rhythm, normal heart sounds and intact distal pulses  Tachycardia present  Exam reveals no friction rub  No murmur heard  Pulmonary/Chest: Effort normal and breath sounds normal  No respiratory distress  She has no wheezes  She has no rales  Abdominal: Soft  She exhibits no distension  There is no tenderness  There is no rebound and no guarding  Musculoskeletal: Normal range of motion  She exhibits no edema, tenderness or deformity  Neurological: She is alert and oriented to person, place, and time  Gait normal    No active tremors    No clonus    No asterixis   Skin: Skin is warm and dry  She is not diaphoretic  Psychiatric: She has a normal mood and affect  Her speech is slurred  She is not actively hallucinating  Thought content is not paranoid and not delusional  Cognition and memory are not impaired  She expresses no homicidal and no suicidal ideation  She expresses no suicidal plans and no homicidal plans  She is attentive  Nursing note and vitals reviewed  Vital Signs  ED Triage Vitals   Temperature Pulse Respirations Blood Pressure SpO2   09/06/19 0419 09/06/19 0419 09/06/19 0419 09/06/19 0419 09/06/19 0419   98 °F (36 7 °C) (!) 106 19 (!) 175/100 95 %      Temp src Heart Rate Source Patient Position - Orthostatic VS BP Location FiO2 (%)   -- 09/06/19 0602 09/06/19 0419 09/06/19 0419 --    Monitor Lying Right arm       Pain Score       09/06/19 0602       No Pain           Vitals:    09/06/19 0419 09/06/19 0602   BP: (!) 175/100 120/62   Pulse: (!) 106 74   Patient Position - Orthostatic VS: Lying Lying         Visual Acuity  Visual Acuity      Most Recent Value   L Pupil Size (mm)  2   R Pupil Size (mm)  2          ED Medications  Medications   sodium chloride 0 9 % bolus 1,000 mL (1,000 mL Intravenous New Bag 9/6/19 0458)   thiamine (VITAMIN B1) 100 mg in sodium chloride 0 9 % 50 mL IVPB (0 mg Intravenous Stopped 1/2/99 1207)   folic acid 1 mg in sodium chloride 0 9 % 50 mL IVPB (0 mg Intravenous Stopped 9/6/19 0632)   ondansetron (ZOFRAN) injection 4 mg (4 mg Intravenous Given 9/6/19 0457)       Diagnostic Studies  Results Reviewed     Procedure Component Value Units Date/Time    Acetaminophen level-If concentration is detectable, please discuss with medical  on call   [[id number]]  (Abnormal) Collected:  09/06/19 0455    Lab Status:  Final result Specimen:  Blood from Arm, Left Updated:  09/06/19 0546     Acetaminophen Level <2 ug/mL     Comprehensive metabolic panel [[id number]]  (Abnormal) Collected:  09/06/19 0455    Lab Status:  Final result Specimen:  Blood from Arm, Left Updated:  09/06/19 0526     Sodium 141 mmol/L      Potassium 3 7 mmol/L      Chloride 102 mmol/L      CO2 25 mmol/L      ANION GAP 14 mmol/L      BUN 13 mg/dL      Creatinine 0 74 mg/dL      Glucose 143 mg/dL      Calcium 8 9 mg/dL      AST 20 U/L      ALT 34 U/L      Alkaline Phosphatase 87 U/L      Total Protein 7 2 g/dL      Albumin 3 7 g/dL      Total Bilirubin 0 25 mg/dL      eGFR 94 ml/min/1 73sq m     Narrative:       National Kid[employer] Disease Foundation guidelines for Chronic Kidney Disease (CKD):     Stage 1 with normal or high GFR (GFR > 90 mL/min/1 73 square meters)    Stage 2 Mild CKD (GFR = 60-89 mL/min/1 73 square meters)    Stage 3A Moderate CKD (GFR = 45-59 mL/min/1 73 square meters)    Stage 3B Moderate CKD (GFR = 30-44 mL/min/1 73 square meters)    Stage 4 Severe CKD (GFR = 15-29 mL/min/1 73 square meters)    Stage 5 End Stage CKD (GFR <15 mL/min/1 73 square meters)  Note: GFR calculation is accurate only with a steady state creatinine    Lipase [[id number]]  (Normal) Collected:  09/06/19 0455    Lab Status:  Final result Specimen:  Blood from Arm, Left Updated:  09/06/19 0526     Lipase 518 u/L     Salicylate level [[id number]]  (Normal) Collected:  09/06/19 0455    Lab Status:  Final result Specimen:  Blood from Arm, Left Updated:  96/16/28 6973     Salicylate Lvl 4 5 mg/dL     Rapid drug screen, urine [[id number]]  (Normal) Collected:  09/06/19 0451    Lab Status:  Final result Specimen:  Urine, Clean Catch Updated:  09/06/19 0524     Amph/Meth UR Negative     Barbiturate Ur Negative     Benzodiazepine Urine Negative     Cocaine Urine Negative     Methadone Urine Negative     Opiate Urine Negative     PCP Ur Negative     THC Urine Negative    Narrative:       FOR MEDICAL PURPOSES ONLY  IF CONFIRMATION NEEDED PLEASE CONTACT THE LAB WITHIN 5 DAYS      Drug Screen Cutoff Levels:  AMPHETAMINE/METHAMPHETAMINES  1000 ng/mL  BARBITURATES     200 ng/mL  BENZODIAZEPINES     200 ng/mL  COCAINE      300 ng/mL  METHADONE      300 ng/mL  OPIATES      300 ng/mL  PHENCYCLIDINE     25 ng/mL  THC       50 ng/mL      Ethanol [[id number]]  (Abnormal) Collected:  09/06/19 0455    Lab Status:  Final result Specimen:  Blood from Arm, Left Updated:  09/06/19 0519     Ethanol Lvl 222 mg/dL     Urine Microscopic [[id number]]  (Abnormal) Collected: 09/06/19 0452    Lab Status:  Final result Specimen:  Urine, Clean Catch Updated:  09/06/19 0515     RBC, UA 2-4 /hpf      WBC, UA 4-10 /hpf      Epithelial Cells Occasional /hpf      Bacteria, UA Occasional /hpf      Fine granular casts 1-2 /lpf     CBC and differential [[id number]] Collected:  09/06/19 0455    Lab Status:  Final result Specimen:  Blood from Arm, Left Updated:  09/06/19 0506     WBC 8 05 Thousand/uL      RBC 4 60 Million/uL      Hemoglobin 14 8 g/dL      Hematocrit 44 7 %      MCV 97 fL      MCH 32 2 pg      MCHC 33 1 g/dL      RDW 13 2 %      MPV 8 9 fL      Platelets 534 Thousands/uL      nRBC 0 /100 WBCs      Neutrophils Relative 65 %      Immat GRANS % 1 %      Lymphocytes Relative 27 %      Monocytes Relative 7 %      Eosinophils Relative 0 %      Basophils Relative 0 %      Neutrophils Absolute 5 24 Thousands/µL      Immature Grans Absolute 0 07 Thousand/uL      Lymphocytes Absolute 2 14 Thousands/µL      Monocytes Absolute 0 57 Thousand/µL      Eosinophils Absolute 0 01 Thousand/µL      Basophils Absolute 0 02 Thousands/µL     POCT urinalysis dipstick [[id number]]  (Normal) Resulted:  09/06/19 0457    Lab Status:  Final result Specimen:  Urine Updated:  09/06/19 0457     Color, UA yellow     Clarity, UA clear    ED Urine Macroscopic [[id number]]  (Abnormal) Collected:  09/06/19 0452    Lab Status:  Final result Specimen:  Urine Updated:  09/06/19 0453     Color, UA Yellow     Clarity, UA Clear     pH, UA 5 5     Leukocytes, UA Negative     Nitrite, UA Negative     Protein, UA Trace mg/dl      Glucose, UA Negative mg/dl      Ketones, UA Negative mg/dl      Urobilinogen, UA 0 2 E U /dl      Bilirubin, UA Negative     Blood, UA Negative     Specific Gravity, UA >=1 030    Narrative:       [facility] RESULT                 No orders to display              Procedures  Procedures       ED Course                               MDM  Number of Diagnoses or Management Options  Alcohol abuse: new and requires workup  Diagnosis management comments: Patient is a 53-year-old female with history of alcohol abuse with recent rehab in May of this year  She presents requesting rehab again  Patient appears intoxicated now  Unable to fully tell me when her last drink was  Will have an IV placed, give IV fluids, Zofran, thiamine, folic acid, check labs and if stable have her speak with crisis for rehab placement  6:39 AM  Patient remained stable  Labs noted  Will need alcohol redraw before crisis evaluation  Pt will be s/o to oncoming physician  Amount and/or Complexity of Data Reviewed  Clinical lab tests: ordered and reviewed  Tests in the medicine section of CPT®: reviewed and ordered  Review and summarize past medical records: yes    Patient Progress  Patient progress: stable      Disposition  Final diagnoses:   Alcohol abuse     Time reflects when diagnosis was documented in both MDM as applicable and the Disposition within this note     Time User Action Codes Description Comment    9/6/2019  5:37 AM Jamie Rausch [facility] Add [F10 10] Alcohol abuse       ED Disposition     None      Follow-up Information    None         Patient's Medications   Discharge Prescriptions    No medications on file     No discharge procedures on file      ED Provider  Electronically Signed by           Casie Potter DO  09/06/19 8597

## 2019-09-17 ENCOUNTER — HOSPITAL ENCOUNTER (EMERGENCY)
Facility: HOSPITAL | Age: 54
Discharge: HOME | End: 2019-09-18
Attending: EMERGENCY MEDICINE | Admitting: EMERGENCY MEDICINE
Payer: MEDICARE

## 2019-09-17 DIAGNOSIS — F31.9 BIPOLAR DEPRESSION (HCC): ICD-10-CM

## 2019-09-17 DIAGNOSIS — F32.A DEPRESSION: ICD-10-CM

## 2019-09-17 DIAGNOSIS — T50.901A OVERDOSE: Primary | ICD-10-CM

## 2019-09-17 DIAGNOSIS — F10.929 ALCOHOL INTOXICATION (HCC): ICD-10-CM

## 2019-09-17 LAB
ALBUMIN SERPL BCP-MCNC: 3.6 G/DL (ref 3.5–5)
ALP SERPL-CCNC: 77 U/L (ref 46–116)
ALT SERPL W P-5'-P-CCNC: 31 U/L (ref 12–78)
AMPHETAMINES SERPL QL SCN: NEGATIVE
ANION GAP SERPL CALCULATED.3IONS-SCNC: 10 MMOL/L (ref 4–13)
APAP SERPL-MCNC: <2 UG/ML (ref 10–20)
APTT PPP: 25 SECONDS (ref 23–37)
AST SERPL W P-5'-P-CCNC: 23 U/L (ref 5–45)
BACTERIA UR QL AUTO: ABNORMAL /HPF
BARBITURATES UR QL: NEGATIVE
BASOPHILS # BLD AUTO: 0.02 THOUSANDS/ΜL (ref 0–0.1)
BASOPHILS NFR BLD AUTO: 0 % (ref 0–1)
BENZODIAZ UR QL: NEGATIVE
BILIRUB SERPL-MCNC: 0.35 MG/DL (ref 0.2–1)
BILIRUB UR QL STRIP: NEGATIVE
BUN SERPL-MCNC: 11 MG/DL (ref 5–25)
CALCIUM SERPL-MCNC: 8.7 MG/DL (ref 8.3–10.1)
CHLORIDE SERPL-SCNC: 109 MMOL/L (ref 100–108)
CLARITY UR: ABNORMAL
CO2 SERPL-SCNC: 27 MMOL/L (ref 21–32)
COCAINE UR QL: NEGATIVE
COLOR UR: YELLOW
CREAT SERPL-MCNC: 0.74 MG/DL (ref 0.6–1.3)
EOSINOPHIL # BLD AUTO: 0 THOUSAND/ΜL (ref 0–0.61)
EOSINOPHIL NFR BLD AUTO: 0 % (ref 0–6)
ERYTHROCYTE [DISTWIDTH] IN BLOOD BY AUTOMATED COUNT: 13.4 % (ref 11.6–15.1)
ETHANOL SERPL-MCNC: 354 MG/DL (ref 0–3)
GFR SERPL CREATININE-BSD FRML MDRD: 94 ML/MIN/1.73SQ M
GLUCOSE SERPL-MCNC: 120 MG/DL (ref 65–140)
GLUCOSE UR STRIP-MCNC: NEGATIVE MG/DL
HCT VFR BLD AUTO: 47.2 % (ref 34.8–46.1)
HGB BLD-MCNC: 15.7 G/DL (ref 11.5–15.4)
HGB UR QL STRIP.AUTO: ABNORMAL
IMM GRANULOCYTES # BLD AUTO: 0.04 THOUSAND/UL (ref 0–0.2)
IMM GRANULOCYTES NFR BLD AUTO: 1 % (ref 0–2)
INR PPP: 0.92 (ref 0.84–1.19)
KETONES UR STRIP-MCNC: NEGATIVE MG/DL
LACTATE SERPL-SCNC: 1.8 MMOL/L (ref 0.5–2)
LEUKOCYTE ESTERASE UR QL STRIP: NEGATIVE
LYMPHOCYTES # BLD AUTO: 3.09 THOUSANDS/ΜL (ref 0.6–4.47)
LYMPHOCYTES NFR BLD AUTO: 35 % (ref 14–44)
MAGNESIUM SERPL-MCNC: 2.4 MG/DL (ref 1.6–2.6)
MCH RBC QN AUTO: 32.7 PG (ref 26.8–34.3)
MCHC RBC AUTO-ENTMCNC: 33.3 G/DL (ref 31.4–37.4)
MCV RBC AUTO: 98 FL (ref 82–98)
METHADONE UR QL: NEGATIVE
MONOCYTES # BLD AUTO: 0.73 THOUSAND/ΜL (ref 0.17–1.22)
MONOCYTES NFR BLD AUTO: 8 % (ref 4–12)
NEUTROPHILS # BLD AUTO: 4.99 THOUSANDS/ΜL (ref 1.85–7.62)
NEUTS SEG NFR BLD AUTO: 56 % (ref 43–75)
NITRITE UR QL STRIP: NEGATIVE
NON-SQ EPI CELLS URNS QL MICRO: ABNORMAL /HPF
NRBC BLD AUTO-RTO: 0 /100 WBCS
OPIATES UR QL SCN: NEGATIVE
PCP UR QL: NEGATIVE
PH UR STRIP.AUTO: 5 [PH] (ref 4.5–8)
PHOSPHATE SERPL-MCNC: 3.9 MG/DL (ref 2.7–4.5)
PLATELET # BLD AUTO: 270 THOUSANDS/UL (ref 149–390)
PMV BLD AUTO: 8.8 FL (ref 8.9–12.7)
POTASSIUM SERPL-SCNC: 4.3 MMOL/L (ref 3.5–5.3)
PROT SERPL-MCNC: 6.9 G/DL (ref 6.4–8.2)
PROT UR STRIP-MCNC: NEGATIVE MG/DL
PROTHROMBIN TIME: 12.5 SECONDS (ref 11.6–14.5)
RBC # BLD AUTO: 4.8 MILLION/UL (ref 3.81–5.12)
RBC #/AREA URNS AUTO: ABNORMAL /HPF
SALICYLATES SERPL-MCNC: 4.3 MG/DL (ref 3–20)
SODIUM SERPL-SCNC: 146 MMOL/L (ref 136–145)
SP GR UR STRIP.AUTO: 1.02 (ref 1–1.03)
THC UR QL: NEGATIVE
UROBILINOGEN UR QL STRIP.AUTO: 0.2 E.U./DL
WBC # BLD AUTO: 8.87 THOUSAND/UL (ref 4.31–10.16)
WBC #/AREA URNS AUTO: ABNORMAL /HPF

## 2019-09-17 PROCEDURE — 99285 EMERGENCY DEPT VISIT HI MDM: CPT

## 2019-09-17 PROCEDURE — 99285 EMERGENCY DEPT VISIT HI MDM: CPT | Performed by: EMERGENCY MEDICINE

## 2019-09-17 PROCEDURE — 36415 COLL VENOUS BLD VENIPUNCTURE: CPT | Performed by: EMERGENCY MEDICINE

## 2019-09-17 PROCEDURE — 85025 COMPLETE CBC W/AUTO DIFF WBC: CPT | Performed by: EMERGENCY MEDICINE

## 2019-09-17 PROCEDURE — 81001 URINALYSIS AUTO W/SCOPE: CPT

## 2019-09-17 PROCEDURE — 96361 HYDRATE IV INFUSION ADD-ON: CPT

## 2019-09-17 PROCEDURE — 80307 DRUG TEST PRSMV CHEM ANLYZR: CPT | Performed by: EMERGENCY MEDICINE

## 2019-09-17 PROCEDURE — 84100 ASSAY OF PHOSPHORUS: CPT | Performed by: EMERGENCY MEDICINE

## 2019-09-17 PROCEDURE — 80329 ANALGESICS NON-OPIOID 1 OR 2: CPT | Performed by: EMERGENCY MEDICINE

## 2019-09-17 PROCEDURE — 80053 COMPREHEN METABOLIC PANEL: CPT | Performed by: EMERGENCY MEDICINE

## 2019-09-17 PROCEDURE — 85610 PROTHROMBIN TIME: CPT | Performed by: EMERGENCY MEDICINE

## 2019-09-17 PROCEDURE — 85730 THROMBOPLASTIN TIME PARTIAL: CPT | Performed by: EMERGENCY MEDICINE

## 2019-09-17 PROCEDURE — 83605 ASSAY OF LACTIC ACID: CPT | Performed by: EMERGENCY MEDICINE

## 2019-09-17 PROCEDURE — 83735 ASSAY OF MAGNESIUM: CPT | Performed by: EMERGENCY MEDICINE

## 2019-09-17 PROCEDURE — 80320 DRUG SCREEN QUANTALCOHOLS: CPT | Performed by: EMERGENCY MEDICINE

## 2019-09-17 PROCEDURE — 93005 ELECTROCARDIOGRAM TRACING: CPT

## 2019-09-17 RX ORDER — LORAZEPAM 0.5 MG/1
0.5 TABLET ORAL ONCE
Status: COMPLETED | OUTPATIENT
Start: 2019-09-17 | End: 2019-09-17

## 2019-09-17 RX ADMIN — SODIUM CHLORIDE 1000 ML: 0.9 INJECTION, SOLUTION INTRAVENOUS at 22:02

## 2019-09-17 RX ADMIN — LORAZEPAM 0.5 MG: 0.5 TABLET ORAL at 22:04

## 2019-09-17 RX ADMIN — SODIUM CHLORIDE 1000 ML: 0.9 INJECTION, SOLUTION INTRAVENOUS at 23:09

## 2019-09-18 VITALS
TEMPERATURE: 98 F | OXYGEN SATURATION: 99 % | DIASTOLIC BLOOD PRESSURE: 80 MMHG | RESPIRATION RATE: 16 BRPM | HEART RATE: 82 BPM | SYSTOLIC BLOOD PRESSURE: 128 MMHG

## 2019-09-18 LAB
ETHANOL EXG-MCNC: 0.1 MG/DL
ETHANOL EXG-MCNC: 0.13 MG/DL

## 2019-09-18 PROCEDURE — 96361 HYDRATE IV INFUSION ADD-ON: CPT

## 2019-09-18 PROCEDURE — 99285 EMERGENCY DEPT VISIT HI MDM: CPT | Performed by: EMERGENCY MEDICINE

## 2019-09-18 PROCEDURE — 82075 ASSAY OF BREATH ETHANOL: CPT | Performed by: EMERGENCY MEDICINE

## 2019-09-18 PROCEDURE — 96374 THER/PROPH/DIAG INJ IV PUSH: CPT

## 2019-09-18 RX ORDER — DICYCLOMINE HCL 20 MG
20 TABLET ORAL ONCE
Status: COMPLETED | OUTPATIENT
Start: 2019-09-18 | End: 2019-09-18

## 2019-09-18 RX ORDER — TRAZODONE HYDROCHLORIDE 50 MG/1
50 TABLET ORAL
Status: ON HOLD | COMMUNITY
End: 2019-09-26 | Stop reason: SDUPTHER

## 2019-09-18 RX ORDER — CLONIDINE HYDROCHLORIDE 0.1 MG/1
0.1 TABLET ORAL DAILY
Status: DISCONTINUED | OUTPATIENT
Start: 2019-09-18 | End: 2019-09-18 | Stop reason: HOSPADM

## 2019-09-18 RX ORDER — AMLODIPINE BESYLATE 5 MG/1
5 TABLET ORAL DAILY
Status: DISCONTINUED | OUTPATIENT
Start: 2019-09-18 | End: 2019-09-18 | Stop reason: HOSPADM

## 2019-09-18 RX ORDER — LORAZEPAM 1 MG/1
2 TABLET ORAL ONCE
Status: COMPLETED | OUTPATIENT
Start: 2019-09-18 | End: 2019-09-18

## 2019-09-18 RX ORDER — LISINOPRIL 20 MG/1
20 TABLET ORAL DAILY
Status: DISCONTINUED | OUTPATIENT
Start: 2019-09-18 | End: 2019-09-18 | Stop reason: HOSPADM

## 2019-09-18 RX ORDER — GABAPENTIN 300 MG/1
300 CAPSULE ORAL ONCE
Status: COMPLETED | OUTPATIENT
Start: 2019-09-18 | End: 2019-09-18

## 2019-09-18 RX ORDER — HYDROCHLOROTHIAZIDE 12.5 MG/1
25 TABLET ORAL DAILY
Status: DISCONTINUED | OUTPATIENT
Start: 2019-09-18 | End: 2019-09-18 | Stop reason: HOSPADM

## 2019-09-18 RX ORDER — TRAZODONE HYDROCHLORIDE 50 MG/1
50 TABLET ORAL
Status: DISCONTINUED | OUTPATIENT
Start: 2019-09-18 | End: 2019-09-18 | Stop reason: HOSPADM

## 2019-09-18 RX ORDER — ESCITALOPRAM OXALATE 10 MG/1
10 TABLET ORAL DAILY
Status: DISCONTINUED | OUTPATIENT
Start: 2019-09-18 | End: 2019-09-18 | Stop reason: HOSPADM

## 2019-09-18 RX ORDER — LEVOTHYROXINE SODIUM 0.05 MG/1
50 TABLET ORAL
Status: DISCONTINUED | OUTPATIENT
Start: 2019-09-18 | End: 2019-09-18 | Stop reason: HOSPADM

## 2019-09-18 RX ORDER — MAGNESIUM HYDROXIDE/ALUMINUM HYDROXICE/SIMETHICONE 120; 1200; 1200 MG/30ML; MG/30ML; MG/30ML
30 SUSPENSION ORAL ONCE
Status: COMPLETED | OUTPATIENT
Start: 2019-09-18 | End: 2019-09-18

## 2019-09-18 RX ORDER — LORAZEPAM 1 MG/1
1 TABLET ORAL ONCE
Status: COMPLETED | OUTPATIENT
Start: 2019-09-18 | End: 2019-09-18

## 2019-09-18 RX ORDER — ONDANSETRON 2 MG/ML
INJECTION INTRAMUSCULAR; INTRAVENOUS
Status: COMPLETED
Start: 2019-09-18 | End: 2019-09-18

## 2019-09-18 RX ORDER — ESCITALOPRAM OXALATE 10 MG/1
10 TABLET ORAL DAILY
COMMUNITY
End: 2019-09-26 | Stop reason: HOSPADM

## 2019-09-18 RX ORDER — METOPROLOL TARTRATE 50 MG/1
50 TABLET, FILM COATED ORAL DAILY
Status: DISCONTINUED | OUTPATIENT
Start: 2019-09-18 | End: 2019-09-18 | Stop reason: HOSPADM

## 2019-09-18 RX ORDER — LOPERAMIDE HYDROCHLORIDE 2 MG/1
2 CAPSULE ORAL ONCE
Status: COMPLETED | OUTPATIENT
Start: 2019-09-18 | End: 2019-09-18

## 2019-09-18 RX ORDER — ONDANSETRON 2 MG/ML
4 INJECTION INTRAMUSCULAR; INTRAVENOUS ONCE
Status: COMPLETED | OUTPATIENT
Start: 2019-09-18 | End: 2019-09-18

## 2019-09-18 RX ORDER — ONDANSETRON 4 MG/1
4 TABLET, ORALLY DISINTEGRATING ORAL ONCE
Status: COMPLETED | OUTPATIENT
Start: 2019-09-18 | End: 2019-09-18

## 2019-09-18 RX ORDER — PANTOPRAZOLE SODIUM 40 MG/1
40 TABLET, DELAYED RELEASE ORAL ONCE
Status: COMPLETED | OUTPATIENT
Start: 2019-09-18 | End: 2019-09-18

## 2019-09-18 RX ADMIN — ALUMINUM HYDROXIDE, MAGNESIUM HYDROXIDE, AND SIMETHICONE 30 ML: 200; 200; 20 SUSPENSION ORAL at 02:04

## 2019-09-18 RX ADMIN — CLONIDINE HYDROCHLORIDE 0.1 MG: 0.1 TABLET ORAL at 08:18

## 2019-09-18 RX ADMIN — PANTOPRAZOLE SODIUM 40 MG: 40 TABLET, DELAYED RELEASE ORAL at 08:18

## 2019-09-18 RX ADMIN — AMLODIPINE BESYLATE 5 MG: 5 TABLET ORAL at 08:18

## 2019-09-18 RX ADMIN — LORAZEPAM 2 MG: 1 TABLET ORAL at 12:17

## 2019-09-18 RX ADMIN — ONDANSETRON 4 MG: 2 INJECTION INTRAMUSCULAR; INTRAVENOUS at 00:18

## 2019-09-18 RX ADMIN — LISINOPRIL 20 MG: 20 TABLET ORAL at 08:18

## 2019-09-18 RX ADMIN — GABAPENTIN 300 MG: 300 CAPSULE ORAL at 08:18

## 2019-09-18 RX ADMIN — HYDROCHLOROTHIAZIDE 25 MG: 12.5 TABLET ORAL at 08:18

## 2019-09-18 RX ADMIN — METOPROLOL TARTRATE 50 MG: 50 TABLET, FILM COATED ORAL at 08:18

## 2019-09-18 RX ADMIN — DICYCLOMINE HYDROCHLORIDE 20 MG: 20 TABLET ORAL at 02:04

## 2019-09-18 RX ADMIN — ONDANSETRON 4 MG: 4 TABLET, ORALLY DISINTEGRATING ORAL at 13:07

## 2019-09-18 RX ADMIN — LEVOTHYROXINE SODIUM 50 MCG: 50 TABLET ORAL at 06:19

## 2019-09-18 RX ADMIN — LOPERAMIDE HYDROCHLORIDE 2 MG: 2 CAPSULE ORAL at 13:07

## 2019-09-18 RX ADMIN — ESCITALOPRAM OXALATE 10 MG: 10 TABLET ORAL at 10:24

## 2019-09-18 RX ADMIN — LORAZEPAM 1 MG: 1 TABLET ORAL at 06:48

## 2019-09-18 RX ADMIN — LORAZEPAM 1 MG: 1 TABLET ORAL at 00:19

## 2019-09-18 NOTE — ED NOTES
Patient requesting for me to call [employer] for updated med list because she would like her psych meds given but only one in med list was [facility], I called and got med list update, new meds Lexapro and Trazadone added to med list, Dr Ana Espinoza made aware of meds added/to be ordered       Ariana Love RN  09/18/19 1591

## 2019-09-18 NOTE — ED PROVIDER NOTES
Patient signed out to me at change of shift  She needs to be evaluated by crisis when clinically sober later this morning  She is well known to this facility and has been admitted in the past       I personally spoke with the patient when clinically sober and she denies suicidal ideations  She tells me that she did not take anything other than drinking alcohol  She wants help for alcoholism and was seen by [facility]  There are no beds available today so she can be discharged and then they will contact her when there is a bed available             Alvin Santos DO  09/18/19 4505

## 2019-09-18 NOTE — ED NOTES
1:1 Behavioral Health Safety Checks being preformed by this writer  Please see ED Behavioral Health Observation Record for details        Gurinder Butt  73/02/94 8126

## 2019-09-18 NOTE — ED NOTES
Patient reminded numerous times to keep arm straight to allow IV fluids to properly infuse          Manjit Hoang RN  09/17/19 8999
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
## 2019-09-18 NOTE — ED NOTES
Pt demanding, rude and entitled  Pt cursing at staff underneath stating, "bitchass, stupid ass bitch, stupid ass aide " Pt yelling and cursing at staff again  Pt redirected by this writer and RN  Pt continues to not be redirectable        Toribio Joy  84/73/73 8[address] [facility] Fannie  13/24/36 3198

## 2019-09-18 NOTE — ED NOTES
After using bathroom pt c/o to aid that her rectum was hurting  Pt reports it is raw d/t going to bathroom   Pt given calmoseptine cream for relief     Matheus Parks RN  09/18/19 3289

## 2019-09-18 NOTE — ED NOTES
Fluids continue to infuse, however slowly due to pt bending arm and positional catheter  No signs of infiltration present          Louise Enrique RN  09/18/19 2519

## 2019-09-18 NOTE — ED NOTES
Patient reports that she is unsure what "psych meds" she is taking on a daily basis  Med Rec completed to best of writers ability at this time          Kamla Bassett RN  09/18/19 6881

## 2019-09-18 NOTE — ED PROVIDER NOTES
History  Chief Complaint   Patient presents with    Overdose - Intentional     Pt states " i want to die" Pt states I am embarassed that I make my son Rusty Brumfield  Pt is hysterical in triage  Pt reports " I took pills to kill mysel" Pt states " I dont know what pills I took"  Pt reports she ingested the pills within the past few hours      47 yo female who presents with depression, tearful and suicidal ideations  Pt has been off bipolar meds, felt like a failure and embarassment to her son and thus has been having suicidal thoughts  Pt has been drinking ETOH - beer, more heavily in recent days and took "any meds I could find" but unable to tell me what or how much she took or even when she took it, only able to say "earlier today"  She texted son who brought her here but had to go to work, the above is a viscious cycle, then she comes in as psych eval, gets meds adjusted and starts doing well and then stops meds and repeats cycle  History provided by:  Patient   used: No    Overdose - Intentional   Ingested substance:  Unable to specify  Ingestion amount:  Unknown  Witnesses present: no    Called poison control: no    Incident location:  Home  Context: intentional ingestion and suicide attempt    Associated symptoms: no abdominal pain, no agitation, no altered mental status, no anxiety, no chest pain, no cough, no diaphoresis, no diarrhea, no headaches, no lethargy, no nausea, no shortness of breath, no slurred speech, no unresponsiveness, no visual changes and no vomiting        Prior to Admission Medications   Prescriptions Last Dose Informant Patient Reported? Taking?    amLODIPine (NORVASC) 5 mg tablet 9/17/2019 at Unknown time  Yes Yes   Sig: Take 5 mg by mouth daily   cloNIDine (CATAPRES) 0 1 mg tablet 9/17/2019 at Unknown time  No Yes   Sig: Take 1 tablet (0 1 mg total) by mouth 2 (two) times a day At 9am and 6pm   gabapentin (NEURONTIN) 300 mg capsule 9/17/2019 at Unknown time  No Yes   Sig: Take 1 capsule (300 mg total) by mouth 3 (three) times a day At 9am, 4pm, 9pm   levothyroxine 50 mcg tablet 9/17/2019 at Unknown time  Yes Yes   Sig: Take 50 mcg by mouth daily   lisinopril 20 mg TABS 20 mg, hydrochlorothiazide 25 mg TABS 25 mg 9/17/2019 at Unknown time  Yes Yes   Sig: Take by mouth daily   lurasidone (LATUDA) 40 mg tablet Unknown at Unknown time  No No   Sig: Take 1 tablet (40 mg total) by mouth daily with dinner   metoprolol tartrate (LOPRESSOR) 50 mg tablet 9/17/2019 at Unknown time  Yes Yes   Sig: Take 1 tablet by mouth every 12 (twelve) hours   naltrexone (REVIA) 50 mg tablet Unknown at Unknown time  No No   Sig: Take 1 tablet (50 mg total) by mouth daily At 9am   pantoprazole (PROTONIX) 40 mg tablet 9/17/2019 at Unknown time  No Yes   Sig: Take 1 tablet (40 mg total) by mouth daily Take 30 minutes before breakfast   rosuvastatin (CRESTOR) 20 MG tablet 9/17/2019 at Unknown time  Yes Yes   Sig: Take 20 mg by mouth daily      Facility-Administered Medications: None       Past Medical History:   Diagnosis Date    [facility]     Alcoholism [facility]     Anxiety     Bowel obstruction ([facility] [address] [facility]     Depression     Gastritis     Hypertension        Past Surgical History:   Procedure Laterality Date    ABDOMINAL SURGERY      APPENDECTOMY      BOWEL RESECTION      CHOLECYSTECTOMY      ESOPHAGOGASTRODUODENOSCOPY N/A 5/18/2018    Procedure: ESOPHAGOGASTRODUODENOSCOPY (EGD) with bx;  Surgeon: Phyllis Lee DO;  Location: [facility]; Service: Gastroenterology    HYSTERECTOMY      KNEE SURGERY Bilateral        History reviewed  No pertinent family history  I have reviewed and agree with the history as documented      Social History     Tobacco Use    Smoking status: Current Every Day Smoker     Packs/day: 0 50     Types: Cigarettes    Smokeless tobacco: Never Used   Substance Use Topics    Alcohol use: Yes     Frequency: 4 or more times a week     Drinks per session: 7 to 9 Binge frequency: Daily or almost daily     Comment: 8 large glasses of beer daily    Drug use: No        Review of Systems   Constitutional: Negative for appetite change, chills, diaphoresis, fatigue and fever  HENT: Negative for sore throat  Eyes: Negative for visual disturbance  Respiratory: Negative for cough and shortness of breath  Cardiovascular: Negative for chest pain  Gastrointestinal: Negative for abdominal pain, diarrhea, nausea and vomiting  Genitourinary: Negative for dysuria, frequency, vaginal bleeding and vaginal discharge  Musculoskeletal: Negative for back pain, neck pain and neck stiffness  Skin: Negative for pallor and rash  Allergic/Immunologic: Negative for immunocompromised state  Neurological: Negative for light-headedness and headaches  Psychiatric/Behavioral: Positive for suicidal ideas  Negative for agitation, confusion, decreased concentration, dysphoric mood and hallucinations  The patient is nervous/anxious  All other systems reviewed and are negative  Physical Exam  Physical Exam   Constitutional: She is oriented to person, place, and time  She appears well-developed and well-nourished  No distress  HENT:   Head: Normocephalic and atraumatic  MM tachy   Eyes: Pupils are equal, round, and reactive to light  EOM are normal    Neck: Normal range of motion  Neck supple  Cardiovascular: Regular rhythm  Tachycardia present  No murmur heard  Pulmonary/Chest: Effort normal and breath sounds normal  No respiratory distress  Abdominal: Soft  Bowel sounds are normal    Musculoskeletal: Normal range of motion  Neurological: She is alert and oriented to person, place, and time  She displays normal reflexes  No cranial nerve deficit or sensory deficit  She exhibits normal muscle tone  Coordination normal    Skin: Skin is warm  Capillary refill takes less than 2 seconds  No rash noted  No pallor     Psychiatric:   Depressed, tearful, suicidal   Nursing note and vitals reviewed        Vital Signs  ED Triage Vitals   Temperature Pulse Respirations Blood Pressure SpO2   09/17/19 2036 09/17/19 2036 09/17/19 2036 09/17/19 2036 09/17/19 2036   98 °F (36 7 °C) (!) 141 (!) 28 (!) 201/129 98 %      Temp Source Heart Rate Source Patient Position - Orthostatic VS BP Location FiO2 (%)   09/17/19 2036 09/17/19 2036 09/17/19 2036 09/17/19 2036 --   Temporal Monitor Sitting Right arm       Pain Score       09/17/19 2236       No Pain           Vitals:    09/18/19 0034 09/18/19 0156 09/18/19 0301 09/18/19 0403   BP: 146/98 128/80 137/73 125/68   Pulse: 87 95 93 92   Patient Position - Orthostatic VS: Lying Lying Lying Lying         Visual Acuity      ED Medications  Medications   amLODIPine (NORVASC) tablet 5 mg (has no administration in time range)   cloNIDine (CATAPRES) tablet 0 1 mg (has no administration in time range)   gabapentin (NEURONTIN) capsule 300 mg (has no administration in time range)   levothyroxine tablet 50 mcg (has no administration in time range)   lisinopril (ZESTRIL) tablet 20 mg (has no administration in time range)   hydrochlorothiazide (HYDRODIURIL) tablet 25 mg (has no administration in time range)   metoprolol tartrate (LOPRESSOR) tablet 50 mg (has no administration in time range)   pantoprazole (PROTONIX) EC tablet 40 mg (has no administration in time range)   sodium chloride 0 9 % bolus 1,000 mL (0 mL Intravenous Stopped 9/18/19 0158)   LORazepam (ATIVAN) tablet 0 5 mg (0 5 mg Oral Given 9/17/19 2204)   sodium chloride 0 9 % bolus 1,000 mL (0 mL Intravenous Stopped 9/18/19 0437)   LORazepam (ATIVAN) tablet 1 mg (1 mg Oral Given 9/18/19 0019)   ondansetron (ZOFRAN) injection 4 mg (4 mg Intravenous Given 9/18/19 0018)   aluminum-magnesium hydroxide-simethicone (MYLANTA) 200-200-20 mg/5 mL oral suspension 30 mL (30 mL Oral Given 9/18/19 0204)   dicyclomine (BENTYL) tablet 20 mg (20 mg Oral Given 9/18/19 0204)       Diagnostic Studies  Results Reviewed Procedure Component Value Units Date/Time    Urine Microscopic [[id number]]  (Abnormal) Collected:  09/17/19 2231    Lab Status:  Final result Specimen:  Urine, Clean Catch Updated:  09/17/19 2336     RBC, UA 4-10 /hpf      WBC, UA None Seen /hpf      Epithelial Cells Moderate /hpf      Bacteria, UA Moderate /hpf     Rapid drug screen, urine [[id number]]  (Normal) Collected:  09/17/19 2229    Lab Status:  Final result Specimen:  Urine, Clean Catch Updated:  09/17/19 2316     Amph/Meth UR Negative     Barbiturate Ur Negative     Benzodiazepine Urine Negative     Cocaine Urine Negative     Methadone Urine Negative     Opiate Urine Negative     PCP Ur Negative     THC Urine Negative    Narrative:       FOR MEDICAL PURPOSES ONLY  IF CONFIRMATION NEEDED PLEASE CONTACT THE LAB WITHIN 5 DAYS      Drug Screen Cutoff Levels:  AMPHETAMINE/METHAMPHETAMINES  1000 ng/mL  BARBITURATES     200 ng/mL  BENZODIAZEPINES     200 ng/mL  COCAINE      300 ng/mL  METHADONE      300 ng/mL  OPIATES      300 ng/mL  PHENCYCLIDINE     25 ng/mL  THC       50 ng/mL      Acetaminophen level-"If concentration is detectable, please discuss with medical  on call " [[id number]]  (Abnormal) Collected:  09/17/19 2141    Lab Status:  Final result Specimen:  Blood from Arm, Right Updated:  09/17/19 2311     Acetaminophen Level <2 ug/mL     Ethanol [[id number]]  (Abnormal) Collected:  09/17/19 2141    Lab Status:  Final result Specimen:  Blood from Arm, Right Updated:  09/17/19 2300     Ethanol Lvl 354 mg/dL     Comprehensive metabolic panel [[id number]]  (Abnormal) Collected:  09/17/19 2141    Lab Status:  Final result Specimen:  Blood from Arm, Right Updated:  09/17/19 2238     Sodium 146 mmol/L      Potassium 4 3 mmol/L      Chloride 109 mmol/L      CO2 27 mmol/L      ANION GAP 10 mmol/L      BUN 11 mg/dL      Creatinine 0 74 mg/dL      Glucose 120 mg/dL      Calcium 8 7 mg/dL      AST 23 U/L      ALT 31 U/L      Alkaline Phosphatase 77 U/L Total Protein 6 9 g/dL      Albumin 3 6 g/dL      Total Bilirubin 0 35 mg/dL      eGFR 94 ml/min/1 73sq m     Narrative:       [place] guidelines for Chronic Kidney Disease (CKD):     Stage 1 with normal or high GFR (GFR > 90 mL/min/1 73 square meters)    Stage 2 Mild CKD (GFR = 60-89 mL/min/1 73 square meters)    Stage 3A Moderate CKD (GFR = 45-59 mL/min/1 73 square meters)    Stage 3B Moderate CKD (GFR = 30-44 mL/min/1 73 square meters)    Stage 4 Severe CKD (GFR = 15-29 mL/min/1 73 square meters)    Stage 5 End Stage CKD (GFR <15 mL/min/1 73 square meters)  Note: GFR calculation is accurate only with a steady state creatinine    Phosphorus [[id number]]  (Normal) Collected:  09/17/19 2141    Lab Status:  Final result Specimen:  Blood from Arm, Right Updated:  09/17/19 2238     Phosphorus 3 9 mg/dL     Magnesium [[id number]]  (Normal) Collected:  09/17/19 2141    Lab Status:  Final result Specimen:  Blood from Arm, Right Updated:  09/17/19 2238     Magnesium 2 4 mg/dL     Salicylate level [[id number]]  (Normal) Collected:  09/17/19 2141    Lab Status:  Final result Specimen:  Blood from Arm, Right Updated:  77/03/93 1267     Salicylate Lvl 4 3 mg/dL     POCT urinalysis dipstick [[id number]]  (Abnormal) Resulted:  09/17/19 2236    Lab Status:  Final result Specimen:  Urine Updated:  09/17/19 2236    ED Urine Macroscopic [[id number]]  (Abnormal) Collected:  09/17/19 2231    Lab Status:  Final result Specimen:  Urine Updated:  09/17/19 2235     Color, UA Yellow     Clarity, UA Slightly Cloudy     pH, UA 5 0     Leukocytes, UA Negative     Nitrite, UA Negative     Protein, UA Negative mg/dl      Glucose, UA Negative mg/dl      Ketones, UA Negative mg/dl      Urobilinogen, UA 0 2 E U /dl      Bilirubin, UA Negative     Blood, UA Trace     Specific [place], UA 1 025    Narrative:       CLINITEK RESULT    Lactic acid, plasma [[id number]]  (Normal) Collected:  09/17/19 2141    Lab Status:  Final result Specimen:  Blood from Arm, Right Updated:  09/17/19 2225     LACTIC ACID 1 8 mmol/L     Narrative:       Result may be elevated if tourniquet was used during collection  Protime-INR [[id number]]  (Normal) Collected:  09/17/19 2141    Lab Status:  Final result Specimen:  Blood from Arm, Right Updated:  09/17/19 2222     Protime 12 5 seconds      INR 0 92    APTT [[id number]]  (Normal) Collected:  09/17/19 2141    Lab Status:  Final result Specimen:  Blood from Arm, Right Updated:  09/17/19 2222     PTT 25 seconds     CBC and differential [[id number]]  (Abnormal) Collected:  09/17/19 2141    Lab Status:  Final result Specimen:  Blood from Arm, Right Updated:  09/17/19 2208     WBC 8 87 Thousand/uL      RBC 4 80 Million/uL      Hemoglobin 15 7 g/dL      Hematocrit 47 2 %      MCV 98 fL      MCH 32 7 pg      MCHC 33 3 g/dL      RDW 13 4 %      MPV 8 8 fL      Platelets 291 Thousands/uL      nRBC 0 /100 WBCs      Neutrophils Relative 56 %      Immat GRANS % 1 %      Lymphocytes Relative 35 %      Monocytes Relative 8 %      Eosinophils Relative 0 %      Basophils Relative 0 %      Neutrophils Absolute 4 99 Thousands/µL      Immature Grans Absolute 0 04 Thousand/uL      Lymphocytes Absolute 3 09 Thousands/µL      Monocytes Absolute 0 73 Thousand/µL      Eosinophils Absolute 0 00 Thousand/µL      Basophils Absolute 0 02 Thousands/µL                  No orders to display              Procedures  Procedures       ED Course  ED Course as of Sep 18 0526   Tue Sep 17, 2019   2107 Pt seen and examined  47 yo female who presents with depression, tearful and suicidal ideations  Pt has been off bipolar meds, felt like a failure and embarassment to her son and thus has been having suicidal thoughts  Pt has been drinking ETOH - beer, more heavily in recent days and took "any meds I could find" but unable to tell me what or how much she took or even when she took it, only able to say "earlier today"    She texted son who brought her here but had to go to work, the above is a viscious cycle, then she comes in as psych eval, gets meds adjusted and starts doing well and then stops meds and repeats cycle  Will check labs, urine, give IVF and d/w tox although this will be continued observation  Pt alert and oriented, neuro intact,  - 108 on my exam, no clonus  Will give ativan 0 5mg        2137 Tox Dr Reva Orta and I discussed - he is in agreement with plan for supportive care and ED crisis to see in am       2303 ETOH 354, IVF infusing  Wed Sep 18, 2019   0012 Pt wide awake and asking for something for anxiety - ativan ordered  0045 Pt c/o crampy abd pain and indigestion - given bentyl and maalox  MDM    Disposition  Final diagnoses:   Suicide attempt ([facility] [address] )   Overdose   Depression   Alcohol intoxication ([facility] [address] )   [facility]     Time reflects when diagnosis was documented in both MDM as applicable and the Disposition within this note     Time User Action Codes Description Comment    9/18/2019  5:23 AM Tejas Rizvi Suicide attempt ([facility] [address] )     9/18/2019  5:23 AM Alek Rizvi [address] Overdose     9/18/2019  5:24 AM Governor Pair M Add [F32 9] Depression     9/18/2019  5:24 AM Governor Pair M Add [F10 929] Alcohol intoxication ([facility] [address] )     9/18/2019  5:26 AM Zofia Cage Add [F31 9] Bipolar depression [facility])       ED Disposition     None      Follow-up Information    None         Patient's Medications   Discharge Prescriptions    No medications on file     No discharge procedures on file      ED Provider  Electronically Signed by           Dash Rooney DO  09/18/19 7884

## 2019-09-18 NOTE — ED NOTES
Assumed care of pt at this time  Pt moved from [address] to room 12 in secure holding area  Pt resting comfortably on stretcher, provided water per request  No acute signs of distress noted  1:1 sitter in place       Rod Alamo RN  09/18/19 6507

## 2019-09-18 NOTE — ED NOTES
Pt observed in restroom actively vomiting  RN and Provider made aware  Pt provided with alcohol free mouth wash and water to rinse mouth  Pt appreciative of both        Brunilda Gao  99/04/87 1496       Brunilda Gao  47/90/79 0020

## 2019-09-18 NOTE — ED NOTES
Pt  Reporting abd pain, nausea, and diarrhea  Also reports vomiting after eating breakfast  Dr Jo Ramírez made aware       Gil Aragon, RN  09/18/19 2169

## 2019-09-18 NOTE — ED NOTES
1:1 removed at this time as patient denies SI/HI to crisis worker, Randall Cobos, charge RN made aware  Will continue 4 random checks per hour        Marichuy Gamez RN  09/18/19 4330

## 2019-09-18 NOTE — ED NOTES
Pt sat up abruptly stating, "I have to pee, YOU AREN'T TAKING ME " Pt's raising voice yelling, "you're mean! You aren't taking me!" Informed Pt that this writer has continuously attempted to explain crisis process and Pt just raises voice at [address] [place] [address]  Charge at bedside to speak with Pt  Pt continues raising voice and being defiant  Pt stated, "FINE! I'M GOING TO PEE RIGHT HERE!" Pt not redirectable and argumentative        Nayan Esteban  92/89/16 4188

## 2019-09-18 NOTE — ED NOTES
Pt ambulatory to restroom with specimen cup; steady gait noted  Pt did not provide urine specimen  Urine and small amount of diarrhea noted in restroom toilet  Pt reminded again of needed specimen and to let staff know when she can void   Pt stated, "when you can't go, you can't go "     Brunilda Gao  20/21/49 6808

## 2019-09-18 NOTE — ED NOTES
Pt observed gagging at this time  Pt's HOB adjusted upwards and moved emesis bucket closer to Pt  Pt states, "I don't feel good " When asking Pt if she is nauseous she stated, "yes " Pt moaning and moving around stretcher at this time        Toribio Joy  35/27/76 9071

## 2019-09-18 NOTE — ED NOTES
Pt not understanding of process to get to speak with crisis worker  Explained to Pt that her alcohol level is too high to speak with crisis and we are working on sobering her up and have her under the legal limit  Pt is not understanding, states, "if you lived my life you wouldn't want to be alive " Pt begins rambling at this point, "you're just an aide, only here to listen  You guys aren't going to give me psych meds that I need  I need to get out of here " Again attempted to explain the process to Pt to which she continues to not understand        Mirna Carpenter  62/21/17 4144

## 2019-09-18 NOTE — ED NOTES
Per Pt ok to updated Betty Connolly (boyfriend) if Pt is admitted/transfered     Contact information: [phone]     Mirna Carpenter  73/36/04 2105

## 2019-09-18 NOTE — ED NOTES
Pt ambulatory to restroom with a specimen cup  Pt aware of needed specimen  Pt was able to void at this time        Oscar Howell  75/91/85 8589

## 2019-09-18 NOTE — ED NOTES
Pt states she drank "a whole lot" of beer and took "a whole lot of pills" because she wanted to die  Pt can't name all the pills she took, states she took everything she could get her hands on   Pt's son present, states this scenario has repeated itself several times recently, pt gets admitted, stays a few days for treatment, then gets released, stays on track for a few days, and then repeats the binge drinking/overdosing on pills        Renetta Riley RN  09/17/19 2052

## 2019-09-18 NOTE — ED NOTES
Pt provided pretzels and saltines at this time  Pt has no complaints of nausea at this time        Toribio Joy  23/58/72 4533

## 2019-09-19 LAB
ATRIAL RATE: 91 BPM
P AXIS: 77 DEGREES
PR INTERVAL: 190 MS
QRS AXIS: 71 DEGREES
QRSD INTERVAL: 70 MS
QT INTERVAL: 344 MS
QTC INTERVAL: 423 MS
T WAVE AXIS: 17 DEGREES
VENTRICULAR RATE: 91 BPM

## 2019-09-19 PROCEDURE — 93010 ELECTROCARDIOGRAM REPORT: CPT | Performed by: INTERNAL MEDICINE

## 2019-09-20 ENCOUNTER — HOSPITAL ENCOUNTER (INPATIENT)
Facility: HOSPITAL | Age: 54
LOS: 5 days | Discharge: HOME | DRG: 885 | End: 2019-09-26
Attending: EMERGENCY MEDICINE | Admitting: PSYCHIATRY & NEUROLOGY
Payer: MEDICARE

## 2019-09-20 DIAGNOSIS — E03.9 HYPOTHYROIDISM: ICD-10-CM

## 2019-09-20 DIAGNOSIS — K21.9 GERD (GASTROESOPHAGEAL REFLUX DISEASE): ICD-10-CM

## 2019-09-20 DIAGNOSIS — K08.89 TOOTH PAIN: ICD-10-CM

## 2019-09-20 DIAGNOSIS — F10.20 ALCOHOL USE DISORDER, SEVERE, DEPENDENCE (HCC): ICD-10-CM

## 2019-09-20 DIAGNOSIS — I10 ESSENTIAL HYPERTENSION: ICD-10-CM

## 2019-09-20 DIAGNOSIS — T50.902A MEDICATION OVERDOSE, INTENTIONAL SELF-HARM, INITIAL ENCOUNTER (HCC): ICD-10-CM

## 2019-09-20 DIAGNOSIS — F31.4 BIPOLAR AFFECTIVE DISORDER, DEPRESSED, SEVERE (HCC): Primary | ICD-10-CM

## 2019-09-20 LAB
ALBUMIN SERPL BCP-MCNC: 3.9 G/DL (ref 3–5.2)
ALP SERPL-CCNC: 72 U/L (ref 43–122)
ALT SERPL W P-5'-P-CCNC: 32 U/L (ref 9–52)
AMPHETAMINES SERPL QL SCN: NEGATIVE
ANION GAP SERPL CALCULATED.3IONS-SCNC: 8 MMOL/L (ref 5–14)
APAP SERPL-MCNC: <10 UG/ML (ref 10–20)
AST SERPL W P-5'-P-CCNC: 31 U/L (ref 14–36)
BARBITURATES UR QL: NEGATIVE
BASOPHILS # BLD AUTO: 0 THOUSANDS/ΜL (ref 0–0.1)
BASOPHILS NFR BLD AUTO: 0 % (ref 0–1)
BENZODIAZ UR QL: NEGATIVE
BILIRUB SERPL-MCNC: 0.4 MG/DL
BILIRUB UR QL STRIP: NEGATIVE
BUN SERPL-MCNC: 8 MG/DL (ref 5–25)
CALCIUM SERPL-MCNC: 9.3 MG/DL (ref 8.4–10.2)
CHLORIDE SERPL-SCNC: 105 MMOL/L (ref 97–108)
CLARITY UR: CLEAR
CO2 SERPL-SCNC: 25 MMOL/L (ref 22–30)
COCAINE UR QL: NEGATIVE
COLOR UR: NORMAL
CREAT SERPL-MCNC: 0.66 MG/DL (ref 0.6–1.2)
EOSINOPHIL # BLD AUTO: 0 THOUSAND/ΜL (ref 0–0.4)
EOSINOPHIL NFR BLD AUTO: 0 % (ref 0–6)
ERYTHROCYTE [DISTWIDTH] IN BLOOD BY AUTOMATED COUNT: 14.3 %
ETHANOL EXG-MCNC: 0.05 MG/DL
EXT PREG TEST URINE: NEGATIVE
EXT. CONTROL ED NAV: NORMAL
GFR SERPL CREATININE-BSD FRML MDRD: 103 ML/MIN/1.73SQ M
GLUCOSE SERPL-MCNC: 125 MG/DL (ref 70–99)
GLUCOSE SERPL-MCNC: 138 MG/DL (ref 65–140)
GLUCOSE UR STRIP-MCNC: NEGATIVE MG/DL
HCT VFR BLD AUTO: 40.2 % (ref 36–46)
HGB BLD-MCNC: 13.8 G/DL (ref 12–16)
HGB UR QL STRIP.AUTO: NEGATIVE
KETONES UR STRIP-MCNC: NEGATIVE MG/DL
LEUKOCYTE ESTERASE UR QL STRIP: NEGATIVE
LYMPHOCYTES # BLD AUTO: 2.6 THOUSANDS/ΜL (ref 0.5–4)
LYMPHOCYTES NFR BLD AUTO: 23 % (ref 25–45)
MCH RBC QN AUTO: 32.9 PG (ref 26–34)
MCHC RBC AUTO-ENTMCNC: 34.4 G/DL (ref 31–36)
MCV RBC AUTO: 96 FL (ref 80–100)
METHADONE UR QL: NEGATIVE
MONOCYTES # BLD AUTO: 0.7 THOUSAND/ΜL (ref 0.2–0.9)
MONOCYTES NFR BLD AUTO: 7 % (ref 1–10)
NEUTROPHILS # BLD AUTO: 7.8 THOUSANDS/ΜL (ref 1.8–7.8)
NEUTS SEG NFR BLD AUTO: 70 % (ref 45–65)
NITRITE UR QL STRIP: NEGATIVE
OPIATES UR QL SCN: NEGATIVE
PCP UR QL: NEGATIVE
PH UR STRIP.AUTO: 7 [PH]
PLATELET # BLD AUTO: 275 THOUSANDS/UL (ref 150–450)
PMV BLD AUTO: 7.6 FL (ref 8.9–12.7)
POTASSIUM SERPL-SCNC: 3.7 MMOL/L (ref 3.6–5)
PROT SERPL-MCNC: 6.6 G/DL (ref 5.9–8.4)
PROT UR STRIP-MCNC: NEGATIVE MG/DL
RBC # BLD AUTO: 4.21 MILLION/UL (ref 4–5.2)
SALICYLATES SERPL-MCNC: <1 MG/DL (ref 10–30)
SODIUM SERPL-SCNC: 138 MMOL/L (ref 137–147)
SP GR UR STRIP.AUTO: 1.01 (ref 1–1.04)
THC UR QL: NEGATIVE
TSH SERPL DL<=0.05 MIU/L-ACNC: 5.22 UIU/ML (ref 0.47–4.68)
UROBILINOGEN UA: NEGATIVE MG/DL
WBC # BLD AUTO: 11.1 THOUSAND/UL (ref 4.5–11)

## 2019-09-20 PROCEDURE — 99285 EMERGENCY DEPT VISIT HI MDM: CPT | Performed by: PHYSICIAN ASSISTANT

## 2019-09-20 PROCEDURE — 96360 HYDRATION IV INFUSION INIT: CPT

## 2019-09-20 PROCEDURE — 80329 ANALGESICS NON-OPIOID 1 OR 2: CPT | Performed by: PHYSICIAN ASSISTANT

## 2019-09-20 PROCEDURE — 99285 EMERGENCY DEPT VISIT HI MDM: CPT

## 2019-09-20 PROCEDURE — 80053 COMPREHEN METABOLIC PANEL: CPT | Performed by: PHYSICIAN ASSISTANT

## 2019-09-20 PROCEDURE — 80307 DRUG TEST PRSMV CHEM ANLYZR: CPT | Performed by: PHYSICIAN ASSISTANT

## 2019-09-20 PROCEDURE — 82075 ASSAY OF BREATH ETHANOL: CPT | Performed by: PHYSICIAN ASSISTANT

## 2019-09-20 PROCEDURE — 93005 ELECTROCARDIOGRAM TRACING: CPT

## 2019-09-20 PROCEDURE — 96361 HYDRATE IV INFUSION ADD-ON: CPT

## 2019-09-20 PROCEDURE — 81003 URINALYSIS AUTO W/O SCOPE: CPT | Performed by: PHYSICIAN ASSISTANT

## 2019-09-20 PROCEDURE — 84443 ASSAY THYROID STIM HORMONE: CPT | Performed by: PHYSICIAN ASSISTANT

## 2019-09-20 PROCEDURE — NC001 PR NO CHARGE: Performed by: EMERGENCY MEDICINE

## 2019-09-20 PROCEDURE — 36415 COLL VENOUS BLD VENIPUNCTURE: CPT | Performed by: PHYSICIAN ASSISTANT

## 2019-09-20 PROCEDURE — 82948 REAGENT STRIP/BLOOD GLUCOSE: CPT

## 2019-09-20 PROCEDURE — 85025 COMPLETE CBC W/AUTO DIFF WBC: CPT | Performed by: PHYSICIAN ASSISTANT

## 2019-09-20 PROCEDURE — 81025 URINE PREGNANCY TEST: CPT | Performed by: PHYSICIAN ASSISTANT

## 2019-09-20 PROCEDURE — 99449 NTRPROF PH1/NTRNET/EHR 31/>: CPT | Performed by: EMERGENCY MEDICINE

## 2019-09-20 RX ADMIN — SODIUM CHLORIDE 1000 ML: 0.9 INJECTION, SOLUTION INTRAVENOUS at 19:16

## 2019-09-20 NOTE — ED PROVIDER NOTES
History  Chief Complaint   Patient presents with    Overdose - Intentional     pt admits to taking 50 tablets-50 mg metoprolol, a puff of marijuana and 4 "little beers"     Patient is a 68-year-old female who presents today after an apparent suicide attempt at 6:00 p m     The patient is reporting she took 50 tablets of her 50 mg labetalol  Patient reports a past medical history significant for anxiety and depression and hypothyroidism and hypertension  Patient reports she took the medications in in at attempt to kill herself as she just want the anxiety to and can't deal with it anymore  Patient reports this is not her 1st suicide attempt and notes she has had admissions to psychiatric facilities in the past   Patient denies any hallucinations or homicidal ideations at this time  History provided by:  Patient  Overdose - Intentional   Ingested substance:  Prescription medication  Time since incident:  1 hour  Ingestion amount:  50 tablets of 50mg Metoprolol  Witnesses present: no    Called poison control: no    Incident location:  Home  Context: depression, intentional ingestion and suicide attempt    Associated symptoms: no abdominal pain, no chest pain, no nausea, no shortness of breath and no vomiting    Risk factors: hx of suicide attempts and similar prior episodes        Prior to Admission Medications   Prescriptions Last Dose Informant Patient Reported? Taking?    amLODIPine (NORVASC) 5 mg tablet Unknown at Unknown time  Yes No   Sig: Take 5 mg by mouth daily   cloNIDine (CATAPRES) 0 1 mg tablet Unknown at Unknown time  No No   Sig: Take 1 tablet (0 1 mg total) by mouth 2 (two) times a day At 9am and 6pm   escitalopram (LEXAPRO) 10 mg tablet Unknown at Unknown time  Yes No   Sig: Take 10 mg by mouth daily   gabapentin (NEURONTIN) 300 mg capsule Unknown at Unknown time  No No   Sig: Take 1 capsule (300 mg total) by mouth 3 (three) times a day At 9am, 4pm, 9pm   levothyroxine 50 mcg tablet Unknown at Unknown time  Yes No   Sig: Take 50 mcg by mouth daily   lisinopril 20 mg TABS 20 mg, hydrochlorothiazide 25 mg TABS 25 mg Unknown at Unknown time  Yes No   Sig: Take by mouth daily   lurasidone (LATUDA) 40 mg tablet Unknown at Unknown time  No No   Sig: Take 1 tablet (40 mg total) by mouth daily with dinner   metoprolol tartrate (LOPRESSOR) 50 mg tablet Unknown at Unknown time  Yes No   Sig: Take 1 tablet by mouth every 12 (twelve) hours   naltrexone (REVIA) 50 mg tablet Unknown at Unknown time  No No   Sig: Take 1 tablet (50 mg total) by mouth daily At 9am   pantoprazole (PROTONIX) 40 mg tablet Unknown at Unknown time  No No   Sig: Take 1 tablet (40 mg total) by mouth daily Take 30 minutes before breakfast   rosuvastatin (CRESTOR) 20 MG tablet Unknown at Unknown time  Yes No   Sig: Take 20 mg by mouth daily   traZODone (DESYREL) 50 mg tablet Unknown at Unknown time  Yes No   Sig: Take 50 mg by mouth daily at bedtime      Facility-Administered Medications: None       Past Medical History:   Diagnosis Date    Addiction [facility] drug ([facility] )     [facility]     Anxiety     Bowel obstruction (HCC)     Depression     Gastritis     Hypertension     Psychiatric illness     PTSD (post-traumatic stress disorder)     Suicide attempt ([facility] )        Past Surgical History:   Procedure Laterality Date    ABDOMINAL SURGERY      APPENDECTOMY      BOWEL RESECTION      CHOLECYSTECTOMY      ESOPHAGOGASTRODUODENOSCOPY N/A 5/18/2018    Procedure: ESOPHAGOGASTRODUODENOSCOPY (EGD) with bx;  Surgeon: Boo Canada DO;  Location: [facility]; Service: Gastroenterology    HYSTERECTOMY      KNEE SURGERY Bilateral        History reviewed  No pertinent family history  I have reviewed and agree with the history as documented  Social History     Tobacco Use    Smoking status: Current Every Day Smoker     Packs/day: 0 50     Types: Cigarettes    Smokeless tobacco: Never Used   Substance Use Topics    Alcohol use:  Yes Frequency: 4 or more times a week     Drinks per session: 7 to 9     Binge frequency: Daily or almost daily     Comment: 8 large glasses of beer daily    Drug use: No        Review of Systems   Constitutional: Negative for chills, fatigue and fever  HENT: Negative for congestion, ear pain, rhinorrhea and sore throat  Eyes: Negative for redness  Respiratory: Negative for chest tightness and shortness of breath  Cardiovascular: Negative for chest pain and palpitations  Gastrointestinal: Negative for abdominal pain, nausea and vomiting  Genitourinary: Negative for dysuria and hematuria  Musculoskeletal: Negative  Skin: Negative for rash  Neurological: Negative for dizziness, syncope, light-headedness and numbness  Psychiatric/Behavioral: Positive for suicidal ideas  The patient is nervous/anxious  Physical Exam  Physical Exam   Constitutional: She is oriented to person, place, and time  She appears well-developed and well-nourished  HENT:   Head: Normocephalic  Eyes: No scleral icterus  Cardiovascular: Normal rate and regular rhythm  Pulmonary/Chest: Effort normal and breath sounds normal  No stridor  Abdominal: Soft  She exhibits no distension  There is no tenderness  Musculoskeletal: Normal range of motion  Neurological: She is alert and oriented to person, place, and time  Skin: Skin is warm and dry  Capillary refill takes less than 2 seconds  Psychiatric: She has a normal mood and affect  She is slowed  She expresses suicidal ideation  She expresses suicidal plans  Nursing note and vitals reviewed        Vital Signs  ED Triage Vitals   Temperature Pulse Respirations Blood Pressure SpO2   09/20/19 1853 09/20/19 1853 09/20/19 1853 09/20/19 1853 09/20/19 1853   98 8 °F (37 1 °C) 87 16 (!) 179/108 99 %      Temp Source Heart Rate Source Patient Position - Orthostatic VS BP Location FiO2 (%)   09/20/19 2245 09/20/19 1853 09/20/19 1853 09/20/19 1853 --   Tympanic Monitor Lying Left arm       Pain Score       09/20/19 1853       No Pain           Vitals:    09/21/19 0340 09/21/19 0658 09/21/19 0800 09/21/19 1239   BP: 163/89 158/78 160/78 128/70   Pulse: 67 86 75    Patient Position - Orthostatic VS: Sitting Lying Sitting          Visual Acuity  Visual Acuity      Most Recent Value   L Pupil Size (mm)  3   R Pupil Size (mm)  3          ED Medications  Medications   amLODIPine (NORVASC) tablet 5 mg (5 mg Oral Given 9/21/19 0834)   cloNIDine (CATAPRES) tablet 0 1 mg (0 1 mg Oral Given 9/21/19 0834)   hydrOXYzine HCL (ATARAX) tablet 50 mg (50 mg Oral Given 9/21/19 0834)   traZODone (DESYREL) tablet 50 mg (has no administration in time range)   nicotine polacrilex (NICORETTE) gum 2 mg (has no administration in time range)   thiamine (VITAMIN B1) tablet 100 mg (100 mg Oral Given 3/53/61 0162)   folic acid (FOLVITE) tablet 1 mg (1 mg Oral Given 9/21/19 0834)   multivitamin-minerals (CENTRUM) tablet 1 tablet (1 tablet Oral Given 9/21/19 0834)   haloperidol (HALDOL) tablet 5 mg (has no administration in time range)   haloperidol lactate (HALDOL) injection 5 mg (has no administration in time range)   magnesium hydroxide (MILK OF MAGNESIA) 400 mg/5 mL oral suspension 30 mL (has no administration in time range)   aluminum-magnesium hydroxide-simethicone (MYLANTA) 200-200-20 mg/5 mL oral suspension 30 mL (has no administration in time range)   acetaminophen (TYLENOL) tablet 650 mg (has no administration in time range)   acetaminophen (TYLENOL) tablet 650 mg (has no administration in time range)   acetaminophen (TYLENOL) tablet 975 mg (has no administration in time range)   LORazepam (ATIVAN) tablet 2 mg (has no administration in time range)   nicotine (NICODERM CQ) 14 mg/24hr TD 24 hr patch 1 patch (1 patch Transdermal Medication Applied 9/21/19 1240)   naltrexone (REVIA) tablet 50 mg (50 mg Oral Given 9/21/19 1238)   lurasidone (LATUDA) tablet 40 mg (has no administration in time range) escitalopram (LEXAPRO) tablet 10 mg (10 mg Oral Given 9/21/19 1238)   gabapentin (NEURONTIN) capsule 300 mg (300 mg Oral Given 9/21/19 1239)   levothyroxine tablet 50 mcg (50 mcg Oral Not Given 9/21/19 1233)   lisinopril (ZESTRIL) tablet 20 mg (20 mg Oral Given 9/21/19 1239)   hydrochlorothiazide (HYDRODIURIL) tablet 25 mg (25 mg Oral Given 9/21/19 1239)   atorvastatin (LIPITOR) tablet 40 mg (has no administration in time range)   sucralfate (CARAFATE) tablet 1 g (has no administration in time range)   pantoprazole (PROTONIX) EC tablet 40 mg (has no administration in time range)   sodium chloride 0 9 % bolus 1,000 mL (0 mL Intravenous Stopped 9/20/19 2207)   LORazepam (ATIVAN) tablet 1 mg (1 mg Oral Given 9/21/19 0130)   amLODIPine (NORVASC) tablet 5 mg (5 mg Oral Given 9/21/19 0236)   cloNIDine (CATAPRES) tablet 0 1 mg (0 1 mg Oral Given 9/21/19 0236)       Diagnostic Studies  Results Reviewed     Procedure Component Value Units Date/Time    TSH, 3rd generation [[id number]]  (Abnormal) Collected:  09/20/19 1910    Lab Status:  Final result Specimen:  Blood from Line, Venous Updated:  09/20/19 2000     TSH 3RD GENERATON 5 220 uIU/mL     Narrative:       Patients undergoing fluorescein dye angiography may retain small amounts of fluorescein in the body for 48-72 hours post procedure  Samples containing fluorescein can produce falsely depressed TSH values  If the patient had this procedure,a specimen should be resubmitted post fluorescein clearance  Salicylate level [[id number]]  (Abnormal) Collected:  09/20/19 1910    Lab Status:  Final result Specimen:  Blood from Line, Venous Updated:  58/64/98 1833     Salicylate Lvl <4 3 mg/dL     Acetaminophen level-If concentration is detectable, please discuss with medical  on call   [[id number]]  (Abnormal) Collected:  09/20/19 1910    Lab Status:  Final result Specimen:  Blood from Line, Venous Updated:  09/20/19 1931     Acetaminophen Level <10 ug/mL Comprehensive metabolic panel [[id number]]  (Abnormal) Collected:  09/20/19 1910    Lab Status:  Final result Specimen:  Blood from Line, Venous Updated:  09/20/19 1931     Sodium 138 mmol/L      Potassium 3 7 mmol/L      Chloride 105 mmol/L      CO2 25 mmol/L      ANION GAP 8 mmol/L      BUN 8 mg/dL      Creatinine 0 66 mg/dL      Glucose 125 mg/dL      Calcium 9 3 mg/dL      AST 31 U/L      ALT 32 U/L      Alkaline Phosphatase 72 U/L      Total Protein 6 6 g/dL      Albumin 3 9 g/dL      Total Bilirubin 0 40 mg/dL      eGFR 103 ml/min/1 73sq m     Narrative:       National Kidney Disease Foundation guidelines for Chronic Kidney Disease (CKD):     Stage 1 with normal or high GFR (GFR > 90 mL/min/1 73 square meters)    Stage 2 Mild CKD (GFR = 60-89 mL/min/1 73 square meters)    Stage 3A Moderate CKD (GFR = 45-59 mL/min/1 73 square meters)    Stage 3B Moderate CKD (GFR = 30-44 mL/min/1 73 square meters)    Stage 4 Severe CKD (GFR = 15-29 mL/min/1 73 square meters)    Stage 5 End Stage CKD (GFR <15 mL/min/1 73 square meters)  Note: GFR calculation is accurate only with a steady state creatinine    CBC and differential [[id number]]  (Abnormal) Collected:  09/20/19 1910    Lab Status:  Final result Specimen:  Blood from Line, Venous Updated:  09/20/19 1920     WBC 11 10 Thousand/uL      RBC 4 21 Million/uL      Hemoglobin 13 8 g/dL      Hematocrit 40 2 %      MCV 96 fL      MCH 32 9 pg      MCHC 34 4 g/dL      RDW 14 3 %      MPV 7 6 fL      Platelets 569 Thousands/uL      Neutrophils Relative 70 %      Lymphocytes Relative 23 %      Monocytes Relative 7 %      Eosinophils Relative 0 %      Basophils Relative 0 %      Neutrophils Absolute 7 80 Thousands/µL      Lymphocytes Absolute 2 60 Thousands/µL      Monocytes Absolute 0 70 Thousand/µL      Eosinophils Absolute 0 00 Thousand/µL      Basophils Absolute 0 00 Thousands/µL     Rapid drug screen, urine [[id number]]  (Normal) Collected:  09/20/19 1843    Lab Status: Final result Specimen:  Urine, Clean Catch Updated:  09/20/19 1914     Amph/Meth UR Negative     Barbiturate Ur Negative     Benzodiazepine Urine Negative     Cocaine Urine Negative     Methadone Urine Negative     Opiate Urine Negative     PCP Ur Negative     THC Urine Negative    Narrative:       FOR MEDICAL PURPOSES ONLY  IF CONFIRMATION NEEDED PLEASE CONTACT THE LAB WITHIN 5 DAYS      Drug Screen Cutoff Levels:  AMPHETAMINE/METHAMPHETAMINES  1000 ng/mL  BARBITURATES     200 ng/mL  BENZODIAZEPINES     200 ng/mL  COCAINE      300 ng/mL  METHADONE      300 ng/mL  OPIATES      300 ng/mL  PHENCYCLIDINE     25 ng/mL  THC       50 ng/mL      POCT alcohol breath test [[id number]]  (Normal) Resulted:  09/20/19 1904    Lab Status:  Final result Updated:  09/20/19 1904     EXTBreath Alcohol 0 049    UA w Reflex to Microscopic w Reflex to Culture [[id number]]  (Normal) Collected:  09/20/19 1850    Lab Status:  Final result Specimen:  Urine, Clean Catch Updated:  09/20/19 1857     Color, UA Straw     Clarity, UA Clear     Specific Gravity, UA 1 010     pH, UA 7 0     Leukocytes, UA Negative     Nitrite, UA Negative     Protein, UA Negative mg/dl      Glucose, UA Negative mg/dl      Ketones, UA Negative mg/dl      Bilirubin, UA Negative     Blood, UA Negative     UROBILINOGEN UA Negative mg/dL     POCT pregnancy, urine [[id number]]  (Normal) Resulted:  09/20/19 1843    Lab Status:  Final result Updated:  09/20/19 1848     EXT PREG TEST UR (Ref: Negative) negative     Control valid    Fingerstick Glucose (POCT) [[id number]]  (Normal) Collected:  09/20/19 1835    Lab Status:  Final result Updated:  09/20/19 1846     POC Glucose 138 mg/dl                  No orders to display              Procedures  ECG 12 Lead Documentation Only  Date/Time: 9/20/2019 8:27 PM  Performed by: Tucker Brooks PA-C  Authorized by: Tucker Brooks PA-C     Indications / Diagnosis:  Beta blocker reported overdose  ECG reviewed by me, the ED Provider: yes    Patient location:  ED  Previous ECG:     Previous ECG:  Compared to current    Similarity:  No change    Comparison to cardiac monitor: Yes    Interpretation:     Interpretation: normal    Rate:     ECG rate:  85    ECG rate assessment: normal    Rhythm:     Rhythm: sinus rhythm    Ectopy:     Ectopy: none    QRS:     QRS axis:  Normal    QRS intervals:  Normal  Conduction:     Conduction: normal    ST segments:     ST segments:  Normal  T waves:     T waves: normal             ED Course  ED Course as of Sep 21 1321   Fri Sep 20, 2019   [facility] Dr Trevino recommending 6 hours obs if no problems, clear for psych  Fluids if hypotensive  If refractive or bradycardic then pressors, epi or norepi  Recommending no insulin  2255 Patient signed out to Dr Kalyan Coleman  At midnight patient can be transitioned to psychiatric care if the patient tries to leave due to suicide attempt pt may be considered for 302                                  MDM    Disposition  Final diagnoses:   Medication overdose, intentional self-harm, initial encounter [facility])     Time reflects when diagnosis was documented in both MDM as applicable and the Disposition within this note     Time User Action Codes Description Comment    9/21/2019  2:35 AM Tricia Mark Add [I10] Essential hypertension     9/21/2019  2:35 AM Tricia Mark Modify [I10] Essential hypertension     9/21/2019  1:21 PM Delano Stone Add [T50 902A] Medication overdose, intentional self-harm, initial encounter [facility])       ED Disposition     ED Disposition Condition Date/Time Comment    Transfer to [facility] Sep 21, 2019  2:49 AM Julia Fernandez should be transferred out to  and has been medically cleared           Follow-up Information    None         Current Discharge Medication List      CONTINUE these medications which have NOT CHANGED    Details   amLODIPine (NORVASC) 5 mg tablet Take 5 mg by mouth daily      cloNIDine (CATAPRES) 0 1 mg tablet Take 1 tablet (0 1 mg total) by mouth 2 (two) times a day At 9am and 6pm  Qty: 60 tablet, Refills: 1    Associated Diagnoses: Essential hypertension      escitalopram (LEXAPRO) 10 mg tablet Take 10 mg by mouth daily      gabapentin (NEURONTIN) 300 mg capsule Take 1 capsule (300 mg total) by mouth 3 (three) times a day At 9am, 4pm, 9pm  Qty: 90 capsule, Refills: 1    Associated Diagnoses: Anxiety      levothyroxine 50 mcg tablet Take 50 mcg by mouth daily      lisinopril 20 mg TABS 20 mg, hydrochlorothiazide 25 mg TABS 25 mg Take by mouth daily      lurasidone (LATUDA) 40 mg tablet Take 1 tablet (40 mg total) by mouth daily with dinner  Qty: 30 tablet, Refills: 1    Associated Diagnoses: Bipolar affective disorder, depressed, severe (HCC)      metoprolol tartrate (LOPRESSOR) 50 mg tablet Take 1 tablet by mouth every 12 (twelve) hours      naltrexone (REVIA) 50 mg tablet Take 1 tablet (50 mg total) by mouth daily At 9am  Qty: 30 tablet, Refills: 1    Associated Diagnoses: Alcohol use disorder, severe, dependence (HCC)      pantoprazole (PROTONIX) 40 mg tablet Take 1 tablet (40 mg total) by mouth daily Take 30 minutes before breakfast  Qty: 30 tablet, Refills: 0    Associated Diagnoses: Gastritis without bleeding, unspecified chronicity, unspecified gastritis type      rosuvastatin (CRESTOR) 20 MG tablet Take 20 mg by mouth daily      traZODone (DESYREL) 50 mg tablet Take 50 mg by mouth daily at bedtime           No discharge procedures on file      ED Provider  Electronically Signed by           Luz Elena Shin PA-C  09/21/19 6666

## 2019-09-21 PROBLEM — T50.901A MEDICATION OVERDOSE: Status: ACTIVE | Noted: 2019-09-21

## 2019-09-21 LAB
ATRIAL RATE: 85 BPM
P AXIS: 56 DEGREES
PR INTERVAL: 276 MS
QRS AXIS: 15 DEGREES
QRSD INTERVAL: 76 MS
QT INTERVAL: 380 MS
QTC INTERVAL: 452 MS
T WAVE AXIS: 17 DEGREES
VENTRICULAR RATE: 85 BPM

## 2019-09-21 PROCEDURE — 99223 1ST HOSP IP/OBS HIGH 75: CPT | Performed by: PSYCHIATRY & NEUROLOGY

## 2019-09-21 PROCEDURE — 93010 ELECTROCARDIOGRAM REPORT: CPT | Performed by: INTERNAL MEDICINE

## 2019-09-21 PROCEDURE — 99222 1ST HOSP IP/OBS MODERATE 55: CPT | Performed by: PHYSICIAN ASSISTANT

## 2019-09-21 RX ORDER — LISINOPRIL 20 MG/1
20 TABLET ORAL DAILY
Status: DISCONTINUED | OUTPATIENT
Start: 2019-09-21 | End: 2019-09-26 | Stop reason: HOSPADM

## 2019-09-21 RX ORDER — PANTOPRAZOLE SODIUM 40 MG/1
40 TABLET, DELAYED RELEASE ORAL
Status: DISCONTINUED | OUTPATIENT
Start: 2019-09-21 | End: 2019-09-26 | Stop reason: HOSPADM

## 2019-09-21 RX ORDER — SUCRALFATE 1 G/1
1 TABLET ORAL
Status: DISCONTINUED | OUTPATIENT
Start: 2019-09-21 | End: 2019-09-26 | Stop reason: HOSPADM

## 2019-09-21 RX ORDER — HALOPERIDOL 5 MG
5 TABLET ORAL EVERY 8 HOURS PRN
Status: DISCONTINUED | OUTPATIENT
Start: 2019-09-21 | End: 2019-09-26 | Stop reason: HOSPADM

## 2019-09-21 RX ORDER — AMLODIPINE BESYLATE 5 MG/1
5 TABLET ORAL DAILY
Status: DISCONTINUED | OUTPATIENT
Start: 2019-09-21 | End: 2019-09-26 | Stop reason: HOSPADM

## 2019-09-21 RX ORDER — ESCITALOPRAM OXALATE 10 MG/1
10 TABLET ORAL DAILY
Status: DISCONTINUED | OUTPATIENT
Start: 2019-09-21 | End: 2019-09-23

## 2019-09-21 RX ORDER — THIAMINE MONONITRATE (VIT B1) 100 MG
100 TABLET ORAL DAILY
Status: DISCONTINUED | OUTPATIENT
Start: 2019-09-21 | End: 2019-09-26 | Stop reason: HOSPADM

## 2019-09-21 RX ORDER — TRAZODONE HYDROCHLORIDE 50 MG/1
50 TABLET ORAL
Status: DISCONTINUED | OUTPATIENT
Start: 2019-09-21 | End: 2019-09-23

## 2019-09-21 RX ORDER — CLONIDINE HYDROCHLORIDE 0.1 MG/1
0.1 TABLET ORAL 2 TIMES DAILY
Status: DISCONTINUED | OUTPATIENT
Start: 2019-09-21 | End: 2019-09-26 | Stop reason: HOSPADM

## 2019-09-21 RX ORDER — LEVOTHYROXINE SODIUM 0.05 MG/1
50 TABLET ORAL DAILY
Status: DISCONTINUED | OUTPATIENT
Start: 2019-09-21 | End: 2019-09-26 | Stop reason: HOSPADM

## 2019-09-21 RX ORDER — NICOTINE 21 MG/24HR
1 PATCH, TRANSDERMAL 24 HOURS TRANSDERMAL DAILY
Status: DISCONTINUED | OUTPATIENT
Start: 2019-09-21 | End: 2019-09-26 | Stop reason: HOSPADM

## 2019-09-21 RX ORDER — HYDROXYZINE 50 MG/1
50 TABLET, FILM COATED ORAL EVERY 6 HOURS PRN
Status: DISCONTINUED | OUTPATIENT
Start: 2019-09-21 | End: 2019-09-26 | Stop reason: HOSPADM

## 2019-09-21 RX ORDER — LORAZEPAM 0.5 MG/1
1 TABLET ORAL ONCE
Status: COMPLETED | OUTPATIENT
Start: 2019-09-21 | End: 2019-09-21

## 2019-09-21 RX ORDER — TRAZODONE HYDROCHLORIDE 50 MG/1
50 TABLET ORAL
Status: DISCONTINUED | OUTPATIENT
Start: 2019-09-21 | End: 2019-09-21

## 2019-09-21 RX ORDER — ACETAMINOPHEN 325 MG/1
650 TABLET ORAL EVERY 6 HOURS PRN
Status: DISCONTINUED | OUTPATIENT
Start: 2019-09-21 | End: 2019-09-26 | Stop reason: HOSPADM

## 2019-09-21 RX ORDER — HALOPERIDOL 5 MG/ML
5 INJECTION INTRAMUSCULAR EVERY 8 HOURS PRN
Status: DISCONTINUED | OUTPATIENT
Start: 2019-09-21 | End: 2019-09-26 | Stop reason: HOSPADM

## 2019-09-21 RX ORDER — NALTREXONE HYDROCHLORIDE 50 MG/1
50 TABLET, FILM COATED ORAL DAILY
Status: DISCONTINUED | OUTPATIENT
Start: 2019-09-21 | End: 2019-09-26 | Stop reason: HOSPADM

## 2019-09-21 RX ORDER — HYDROCHLOROTHIAZIDE 25 MG/1
25 TABLET ORAL DAILY
Status: DISCONTINUED | OUTPATIENT
Start: 2019-09-21 | End: 2019-09-26 | Stop reason: HOSPADM

## 2019-09-21 RX ORDER — AMLODIPINE BESYLATE 5 MG/1
5 TABLET ORAL ONCE
Status: COMPLETED | OUTPATIENT
Start: 2019-09-21 | End: 2019-09-21

## 2019-09-21 RX ORDER — ATORVASTATIN CALCIUM 40 MG/1
40 TABLET, FILM COATED ORAL
Status: DISCONTINUED | OUTPATIENT
Start: 2019-09-21 | End: 2019-09-26 | Stop reason: HOSPADM

## 2019-09-21 RX ORDER — GABAPENTIN 300 MG/1
300 CAPSULE ORAL 3 TIMES DAILY
Status: DISCONTINUED | OUTPATIENT
Start: 2019-09-21 | End: 2019-09-24

## 2019-09-21 RX ORDER — FOLIC ACID 1 MG/1
1 TABLET ORAL DAILY
Status: DISCONTINUED | OUTPATIENT
Start: 2019-09-21 | End: 2019-09-26 | Stop reason: HOSPADM

## 2019-09-21 RX ORDER — LORAZEPAM 2 MG/1
2 TABLET ORAL EVERY 4 HOURS PRN
Status: DISCONTINUED | OUTPATIENT
Start: 2019-09-21 | End: 2019-09-26 | Stop reason: HOSPADM

## 2019-09-21 RX ORDER — ACETAMINOPHEN 325 MG/1
975 TABLET ORAL EVERY 6 HOURS PRN
Status: DISCONTINUED | OUTPATIENT
Start: 2019-09-21 | End: 2019-09-26 | Stop reason: HOSPADM

## 2019-09-21 RX ORDER — MAGNESIUM HYDROXIDE/ALUMINUM HYDROXICE/SIMETHICONE 120; 1200; 1200 MG/30ML; MG/30ML; MG/30ML
30 SUSPENSION ORAL EVERY 4 HOURS PRN
Status: DISCONTINUED | OUTPATIENT
Start: 2019-09-21 | End: 2019-09-26 | Stop reason: HOSPADM

## 2019-09-21 RX ORDER — CLONIDINE HYDROCHLORIDE 0.1 MG/1
0.1 TABLET ORAL ONCE
Status: COMPLETED | OUTPATIENT
Start: 2019-09-21 | End: 2019-09-21

## 2019-09-21 RX ORDER — ACETAMINOPHEN 325 MG/1
650 TABLET ORAL EVERY 4 HOURS PRN
Status: DISCONTINUED | OUTPATIENT
Start: 2019-09-21 | End: 2019-09-24

## 2019-09-21 RX ADMIN — GABAPENTIN 300 MG: 300 CAPSULE ORAL at 12:39

## 2019-09-21 RX ADMIN — CLONIDINE HYDROCHLORIDE 0.1 MG: 0.1 TABLET ORAL at 08:34

## 2019-09-21 RX ADMIN — CLONIDINE HYDROCHLORIDE 0.1 MG: 0.1 TABLET ORAL at 02:36

## 2019-09-21 RX ADMIN — LISINOPRIL 20 MG: 20 TABLET ORAL at 12:39

## 2019-09-21 RX ADMIN — SUCRALFATE 1 G: 1 TABLET ORAL at 21:07

## 2019-09-21 RX ADMIN — AMLODIPINE BESYLATE 5 MG: 5 TABLET ORAL at 08:34

## 2019-09-21 RX ADMIN — GABAPENTIN 300 MG: 300 CAPSULE ORAL at 21:07

## 2019-09-21 RX ADMIN — AMLODIPINE BESYLATE 5 MG: 5 TABLET ORAL at 02:36

## 2019-09-21 RX ADMIN — TRAZODONE HYDROCHLORIDE 50 MG: 50 TABLET ORAL at 21:07

## 2019-09-21 RX ADMIN — Medication 1 TABLET: at 08:34

## 2019-09-21 RX ADMIN — ESCITALOPRAM OXALATE 10 MG: 10 TABLET ORAL at 12:38

## 2019-09-21 RX ADMIN — FOLIC ACID 1 MG: 1 TABLET ORAL at 08:34

## 2019-09-21 RX ADMIN — LURASIDONE HYDROCHLORIDE 40 MG: 40 TABLET, FILM COATED ORAL at 17:32

## 2019-09-21 RX ADMIN — GABAPENTIN 300 MG: 300 CAPSULE ORAL at 17:32

## 2019-09-21 RX ADMIN — NALTREXONE HYDROCHLORIDE 50 MG: 50 TABLET, FILM COATED ORAL at 12:38

## 2019-09-21 RX ADMIN — NICOTINE 1 PATCH: 14 PATCH, EXTENDED RELEASE TRANSDERMAL at 12:40

## 2019-09-21 RX ADMIN — ATORVASTATIN CALCIUM 40 MG: 40 TABLET, FILM COATED ORAL at 17:32

## 2019-09-21 RX ADMIN — HYDROCHLOROTHIAZIDE 25 MG: 25 TABLET ORAL at 12:39

## 2019-09-21 RX ADMIN — CLONIDINE HYDROCHLORIDE 0.1 MG: 0.1 TABLET ORAL at 17:32

## 2019-09-21 RX ADMIN — NICOTINE POLACRILEX 2 MG: 2 GUM, CHEWING ORAL at 17:41

## 2019-09-21 RX ADMIN — LORAZEPAM 1 MG: 0.5 TABLET ORAL at 01:30

## 2019-09-21 RX ADMIN — THIAMINE HCL TAB 100 MG 100 MG: 100 TAB at 08:34

## 2019-09-21 RX ADMIN — HYDROXYZINE HYDROCHLORIDE 50 MG: 50 TABLET ORAL at 08:34

## 2019-09-21 NOTE — CMS CERTIFICATION NOTE
Certification: Based upon physical, mental and social evaluations, I certify that inpatient psychiatric services are medically necessary for this patient for a duration of 5 midnights for the treatment of Bipolar Depression with suicidal thoughts and S/P overdose  Available alternative community resources do not meet the patient's mental health care needs  I further attest that an established written individualized plan of care has been implemented and is outlined in the patient's medical records
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
## 2019-09-21 NOTE — ED NOTES
Patient is accepted at [facility]  Patient is accepted by Dr Clair Morales per insight    Patient may go to the floor at any time after report is completed  Nurse report is to be called to [phone] prior to patient transfer

## 2019-09-21 NOTE — ASSESSMENT & PLAN NOTE
· Patient reported overdose of beta-blocker  · Medical toxicology consult made recommendation -unlikely to overdose however patient was monitored overnight in the ED and is now safe to transfer to inpatient psych unit    · We are consulted for medical management  · Will check TSH with reflex, reviewed EKG, will order fasting lipid panel  · UDS negative, UA negative for infection

## 2019-09-21 NOTE — PLAN OF CARE
Problem: SELF HARM/SUICIDALITY  Goal: Will have no self-injury during hospital stay  Description  INTERVENTIONS:  - Q 15 MINUTES: Routine safety checks  - Q WAKING SHIFT & PRN: Assess risk to determine if routine checks are adequate to maintain patient safety  - Encourage patient to participate actively in care by formulating a plan to combat response to suicidal ideation, identify supports and resources  Outcome: Progressing     Problem: SUBSTANCE USE/ABUSE  Goal: Will have no detox symptoms and will verbalize plan for changing substance-related behavior  Description  INTERVENTIONS:  - Monitor physical status and assess for symptoms of withdrawal  - Administer medication as ordered  - Provide emotional support with 1 on 1 interaction with staff  - Encourage recovery focused program/ addiction education  - Assess for verbalization of changing behaviors related to substance abuse  - Initiate consults and referrals as appropriate (Case Management, Spiritual Care, etc )  Outcome: Progressing  Goal: By discharge, will develop insight into their chemical dependency and sustain motivation to continue in recovery  Description  INTERVENTIONS:  - Attends all daily group sessions and scheduled AA groups  - Actively practices coping skills through participation in the therapeutic community and adherence to program rules  - Reviews and completes assignments from individual treatment plan  - Assist patient development of understanding of their personal cycle of addiction and relapse triggers  Outcome: Progressing  Goal: By discharge, patient will have ongoing treatment plan addressing chemical dependency  Description  INTERVENTIONS:  - Assist patient with resources and/or appointments for ongoing recovery based living  Outcome: Progressing     Problem: DEPRESSION  Goal: Will be euthymic at discharge  Description  INTERVENTIONS:  - Administer medication as ordered  - Provide emotional support via 1:1 interaction with staff  - Encourage involvement in milieu/groups/activities  - Monitor for social isolation  Outcome: Not Progressing     Problem: ANXIETY  Goal: Will report anxiety at manageable levels  Description  INTERVENTIONS:  - Administer medication as ordered  - Teach and encourage coping skills  - Provide emotional support  - Assess patient/family for anxiety and ability to cope  Outcome: Not Progressing  Goal: By discharge: Patient will verbalize 2 strategies to deal with anxiety  Description  Interventions:  - Identify any obvious source/trigger to anxiety  - Staff will assist patient in applying identified coping technique/skills  - Encourage attendance of scheduled groups and activities  Outcome: Not Progressing

## 2019-09-21 NOTE — ED NOTES
EVS (Eligibility Verification System) called - [phone]    Automated system indicates: Medicare A/B

## 2019-09-21 NOTE — CONSULTS
PHONE [facility] Toxicology  Thomas Baca 48 y o  female MRN: [id number]  Unit/Bed#: ED 07 Encounter: [id number]      Reason for Consult / Principal Problem: Medication overdose  Inpatient consult to Toxicology  Consult performed by: Laurita Mccain MD  Consult ordered by: Benedetto Klinefelter, PA-C        09/20/19      ASSESSMENT:  1) Beta blocker overdose, stated  2) Suicidal ideation    DISCUSSION/ RECOMMENDATIONS:  The patient presented after stated overdose of her metoprolol, 50 X 50 mg tablets at around 18:00 this evening  Per my discussion with treating ED provider she is awake and alert with normal exam   Her HR has consistently been in the 80's and she has been hypertensive (she does have a history of hypertension)  Given vital signs to this point I have serious doubts that the patient actually ingested any of her metoprolol  However in an abundance of caution I have recommended that she be observed until midnight  In the unlikely event that she does develop bradycardia or hypotension I would recommend IV fluids as first-line, with epinephrine or norepinephrine started for any refractory hypotension or serious bradycardia or hypotension  Glucagon is less effective and I would not recommend using it  The patient can be medically cleared from toxicology perspective at midnight if she continues to have stable vital signs and normal mental status and exam       For further questions, please call [facility]  Service or [facility] [address] to reach the medical  on call  Hx and PE limited by the dynamics of a phone consultation  I have not personally interviewed or evaluated the patient, but only advised based on the information provided to me  Primary provider is responsible for all clinical decisions       Pertinent history, physical exam and clinical findings and course discussed: Thomas Baca is a 48y o  year old female who presents with stated beta blocker overdose  The patient states she took 50 X 50 mg tabs of her metoprolol this evening at around 18:00  Denied any other ingestions aside from drinking 4 beers  Noted to have normal mental status and exam   HR has been stable in the 80's and patient hypertensive, has prior history of hypertension  Review of systems and physical exam not performed by me  Historical Information   Past Medical History:   Diagnosis Date    [facility]     Alcoholism [facility]     Anxiety     [facility]     Depression     Gastritis     Hypertension      Past Surgical History:   Procedure Laterality Date    ABDOMINAL SURGERY      APPENDECTOMY      BOWEL RESECTION      CHOLECYSTECTOMY      ESOPHAGOGASTRODUODENOSCOPY N/A 5/18/2018    Procedure: ESOPHAGOGASTRODUODENOSCOPY (EGD) with bx;  Surgeon: Jd Sanchez DO;  Location: [facility]; Service: Gastroenterology    HYSTERECTOMY      KNEE SURGERY Bilateral      Social History   Social History     Substance and Sexual Activity   Alcohol Use Yes    Frequency: 4 or more times a week    Drinks per session: 7 to 9    Binge frequency: Daily or almost daily    Comment: 8 large glasses of beer daily     Social History     Substance and Sexual Activity   Drug Use No     Social History     Tobacco Use   Smoking Status Current Every Day Smoker    Packs/day: 0 50    Types: Cigarettes   Smokeless Tobacco Never Used     History reviewed  No pertinent family history  Prior to Admission medications    Medication Sig Start Date End Date Taking?  Authorizing Provider   amLODIPine (NORVASC) 5 mg tablet Take 5 mg by mouth daily    Historical Provider, MD   cloNIDine (CATAPRES) 0 1 mg tablet Take 1 tablet (0 1 mg total) by mouth 2 (two) times a day At 9am and 6pm 5/30/19   Mena Yao PA-C   escitalopram (LEXAPRO) 10 mg tablet Take 10 mg by mouth daily    Historical Provider, MD   gabapentin (NEURONTIN) 300 mg capsule Take 1 capsule (300 mg total) by mouth 3 (three) times a day At 9am, 4pm, 9pm 5/30/19   Aishwarya Orellana PA-C   levothyroxine 50 mcg tablet Take 50 mcg by mouth daily    Historical Provider, MD   lisinopril 20 mg TABS 20 mg, hydrochlorothiazide 25 mg TABS 25 mg Take by mouth daily    Historical Provider, MD   lurasidone (LATUDA) 40 mg tablet Take 1 tablet (40 mg total) by mouth daily with dinner 5/30/19   Aishwarya Orellana PA-C   metoprolol tartrate (LOPRESSOR) 50 mg tablet Take 1 tablet by mouth every 12 (twelve) hours    Historical Provider, MD   naltrexone (REVIA) 50 mg tablet Take 1 tablet (50 mg total) by mouth daily At 9am 5/31/19   Aishwarya Orellana PA-C   pantoprazole (PROTONIX) 40 mg tablet Take 1 tablet (40 mg total) by mouth daily Take 30 minutes before breakfast 5/8/19   Jodie Lyons DO   rosuvastatin (CRESTOR) 20 MG tablet Take 20 mg by mouth daily 12/14/17   Historical Provider, MD   traZODone (DESYREL) 50 mg tablet Take 50 mg by mouth daily at bedtime    Historical Provider, MD       Current Facility-Administered Medications   Medication Dose Route Frequency    sodium chloride 0 9 % bolus 1,000 mL  1,000 mL Intravenous Once       Allergies   Allergen Reactions    Latex Rash       Objective     No intake or output data in the 24 hours ending 09/20/19 2005    Invasive Devices:   Peripheral IV 09/20/19 Left Antecubital (Active)       Vitals   Vitals:    09/20/19 1915 09/20/19 1930 09/20/19 1945 09/20/19 2000   BP: (!) 176/99 (!) 175/108 (!) 154/102 (!) 166/112   Pulse: 86 85 80 86   Resp: 20 (!) 27 (!) 25 21   Patient Position - Orthostatic VS:   Lying    Temp:             EKG, Pathology, and/or Other Studies: No acute abnormality per discussion with ED provider      Lab Results: I have personally reviewed pertinent reports        Labs:  Results from last 7 days   Lab Units 09/20/19 1910   WBC Thousand/uL 11 10*   HEMOGLOBIN g/dL 13 8   HEMATOCRIT % 40 2   PLATELETS Thousands/uL 275   NEUTROS PCT % 70*   LYMPHS PCT % 23* MONOS PCT % 7      Results from last 7 days   Lab Units 09/20/19 1910 09/17/19  2141   POTASSIUM mmol/L 3 7 4 3   CHLORIDE mmol/L 105 109*   CO2 mmol/L 25 27   BUN mg/dL 8 11   CREATININE mg/dL 0 66 0 74   CALCIUM mg/dL 9 3 8 7   ALK PHOS U/L 72 77   ALT U/L 32 31   AST U/L 31 23   MAGNESIUM mg/dL  --  2 4   PHOSPHORUS mg/dL  --  3 9      Results from last 7 days   Lab Units 09/17/19  2141   INR  0 92   PTT seconds 25     Results from last 7 days   Lab Units 09/17/19  2141   LACTIC ACID mmol/L 1 8     0   Lab Value Date/Time    TROPONINI 0 02 05/07/2019 1803    TROPONINI 0 02 05/07/2019 1447    TROPONINI 0 02 05/07/2019 1205    TROPONINI 0 02 05/07/2019 0901    TROPONINI <0 02 05/07/2019 0540    TROPONINI <0 01 08/09/2018 1825    TROPONINI <0 02 05/16/2018 1258         Results from last 7 days   Lab Units 09/20/19 1910 09/17/19  2141   ACETAMINOPHEN LVL ug/mL <10* <2*   ETHANOL LVL mg/dL  --  416*   SALICYLATE LVL mg/dL <7 4* 4 3     Invalid input(s): EXTPREGUR        Counseling / Coordination of Care  Total time spent today 31 minutes  This was a phone consultation

## 2019-09-21 NOTE — PROGRESS NOTES
Pt denies SI  She discussed her long history of anxiety with RN, discussing how she uses alcohol as a way to mask/cope with the anxiety  Pt became tearful when discussing past overdose attempts and her children  Pt states she is going to be a grandmother soon and wants to live and realizes she has much to live for  Pt is pleasant, cooperative, and motivated for treatment

## 2019-09-21 NOTE — PROGRESS NOTES
Patient complains of 7/10 anxiety  Patient denies SI/HI and denies A/V hallucinations  Patient is medication compliant  Patients blood pressure has been good despite patient reporting overdosing on labetalol
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
## 2019-09-21 NOTE — TREATMENT PLAN
TREATMENT PLAN REVIEW - [address] 48 y o  1965 female MRN: [id number]    [address] [address] Room / Bed: Cheree Primrose 343/[facility] [address] Encounter: [id number]          Admit Date/Time:  9/20/2019  6:27 PM    Treatment Team: Attending Provider: Caitlyn Pool MD; Registered Nurse: Valeriy Rutherford RN; Physician Assistant: Pam Polk PA-C; Patient Care Technician: Zoe Bautista; Patient Care Assistant: Maryanne Aly; Patient Care Technician: Sinan Borrego;  Patient Care Assistant: Bria Stover    Diagnosis: Principal Problem:    Reported medication overdose self-harm  Active Problems:    Essential hypertension    Anxiety    Hypothyroidism    Gastritis    Alcohol use disorder, severe, dependence ([facility] )    h/o Seizure ([facility])    PTSD (post-traumatic stress disorder)    Bipolar affective disorder, depressed, severe ([facility]      Patient Strengths/Assets: cooperative, communication skills    Patient Barriers/Limitations: financial instability, limited support system, noncompliant with treatment    Short Term Goals: decrease in depressive symptoms, decrease in anxiety symptoms, decrease in suicidal thoughts    Long Term Goals: improvement in anxiety, stabilization of mood, free of suicidal thoughts    Progress Towards Goals: starting psychiatric medications as prescribed    Recommended Treatment: medication management, patient medication education, group therapy, milieu therapy, continued Behavioral Health psychiatric evaluation/assessment process    Treatment Frequency: daily medication monitoring, group and milieu therapy daily, monitoring through interdisciplinary rounds, monitoring through weekly patient care conferences    Expected Discharge Date:  5 days    Discharge Plan: return to previous living arrangement    Treatment Plan Created/Updated By: Kelsey Lara MD

## 2019-09-21 NOTE — PROGRESS NOTES
09/21/19 1015   Activity/Group Checklist   Group Other (Comment)  (Surviving Trauma Group/Education, Open Discussion)   Attendance Attended   Attendance Duration (min) 16-30  (Left early; stated she was "tired")   Interactions Did not interact   Affect/Mood Blunted/flat

## 2019-09-21 NOTE — ASSESSMENT & PLAN NOTE
Patient takes metoprolol (reportedly overdosed), clonidine, amlodipine, lisinopril  Continue all but beta-blocker, and can add back beta-blocker tomorrow if patient requires additional agent

## 2019-09-21 NOTE — CONSULTS
Consult- Renetta Briceno 1965, 48 y o  female MRN: [id number]  Unit/Bed#: Chanel Smith 343-01 Encounter: [id number]  Primary Care Provider: YONG Wild   Date and time admitted to hospital: 9/20/2019  6:27 PM  Inpatient consult for Medical Clearance for [facility] patient  Consult performed by: Salvatore Goetz PA-C  Consult ordered by: Dalila Emerson MD        * Reported medication overdose self-harm  Assessment & Plan  · Patient reported overdose of beta-blocker  · Medical toxicology consult made recommendation -unlikely to overdose however patient was monitored overnight in the ED and is now safe to transfer to inpatient psych unit  · We are consulted for medical management  · Will check TSH with reflex, reviewed EKG, will order fasting lipid panel  · UDS negative, UA negative for infection    Bipolar affective disorder, depressed, severe ([facility] )  Assessment & Plan  Noted  Management per primary    h/o Seizure [facility])  Assessment & Plan  Patient denies this upon discussion today  Anxiety  Assessment & Plan  Noted  Management per primary    Essential hypertension  Assessment & Plan  Patient takes metoprolol (reportedly overdosed), clonidine, amlodipine, lisinopril  Continue all but beta-blocker, and can add back beta-blocker tomorrow if patient requires additional agent    PTSD (post-traumatic stress disorder)  Assessment & Plan  Noted    Alcohol use disorder, severe, dependence ([facility] )  Assessment & Plan  Monitor for signs of withdrawal    Gastritis  Assessment & Plan  Continue sucralfate, can add PPI    Hypothyroidism  Assessment & Plan  Check TSH with reflex  Patient being of and on levothyroxine 50 mcg  Continue for now      VTE Prophylaxis: Reason for no pharmacologic prophylaxis Low risk  / reason for no mechanical VTE prophylaxis Patient ambulatory       Counseling / Coordination of Care Time: 30 minutes    Greater than 50% of total time spent on patient counseling and coordination of care     Collaboration of Care: Were Recommendations Directly Discussed with Primary Treatment Team? - No     History of Present Illness:    Kathia Mejia is a 48 y o  female who is originally admitted to the [facility] service due to intentional overdose  We are consulted for medical management  Patient reportedly overdosed on beta-blocker  She was seen in the emergency room with consultation to medical toxicology  Medical toxicology had low suspicion that patient truly ingested beta-blocker however she was monitored overnight  Team safe to transfer to medical floor  Patient with multiple hospitalizations for suicidal ideation self-harm  Denies history of seizure  Denies recent sickness  Her other medical conditions are hypertension, hyperlipidemia, hypothyroidism, tobacco abuse  Review of Systems:    Review of Systems    Past Medical and Surgical History:     Past Medical History:   Diagnosis Date    Addiction [facility] drug [facility] )     Alcoholism ([facility] )     Anxiety     [facility]     Depression     Gastritis     Hypertension     Psychiatric illness     PTSD (post-traumatic stress disorder)     Suicide attempt ([facility] )        Past Surgical History:   Procedure Laterality Date    ABDOMINAL SURGERY      APPENDECTOMY      BOWEL RESECTION      CHOLECYSTECTOMY      ESOPHAGOGASTRODUODENOSCOPY N/A 5/18/2018    Procedure: ESOPHAGOGASTRODUODENOSCOPY (EGD) with bx;  Surgeon: Rachid Carbajal DO;  Location: [facility]; Service: Gastroenterology    HYSTERECTOMY      KNEE SURGERY Bilateral        Meds/Allergies:    all medications and allergies reviewed    Allergies:    Allergies   Allergen Reactions    Latex Rash       Social History:     Marital Status:     Substance Use History:   Social History     Substance and Sexual Activity   Alcohol Use Yes    Frequency: 4 or more times a week    Drinks per session: 7 to 9    Binge frequency: Daily or almost daily    Comment: 8 large glasses of beer daily     Social History     Tobacco Use   Smoking Status Current Every Day Smoker    Packs/day: 0 50    Types: Cigarettes   Smokeless Tobacco Never Used     Social History     Substance and Sexual Activity   Drug Use No       Family History:    non-contributory    Physical Exam:     Vitals:   Blood Pressure: 160/78 (09/21/19 0800)  Pulse: 75 (09/21/19 0800)  Temperature: 98 °F (36 7 °C) (09/21/19 0800)  Temp Source: Temporal (09/21/19 0800)  Respirations: 16 (09/21/19 0800)  Height: 5' 3" (160 cm) (09/21/19 0340)  Weight - Scale: 80 5 kg (177 lb 6 4 oz) (09/21/19 0800)  SpO2: 99 % (09/21/19 0340)    Physical Exam   Constitutional: She is oriented to person, place, and time  She appears well-developed and well-nourished  No distress  HENT:   Head: Normocephalic and atraumatic  Eyes: Right eye exhibits no discharge  Left eye exhibits no discharge  No scleral icterus  Cardiovascular: Normal rate and regular rhythm  Exam reveals no gallop and no friction rub  No murmur heard  Pulmonary/Chest: Effort normal and breath sounds normal  No respiratory distress  She has no wheezes  She has no rales  Abdominal: Soft  Bowel sounds are normal  She exhibits no distension  There is no tenderness  Neurological: She is alert and oriented to person, place, and time  No cranial nerve deficit (Nonfocal neurological exam   Cranial nerves 2-12 normal )  Skin: Skin is warm and dry  Nursing note and vitals reviewed  Additional Data:     Lab Results: I have personally reviewed pertinent reports        Results from last 7 days   Lab Units 09/20/19 1910   WBC Thousand/uL 11 10*   HEMOGLOBIN g/dL 13 8   HEMATOCRIT % 40 2   PLATELETS Thousands/uL 275   NEUTROS PCT % 70*   LYMPHS PCT % 23*   MONOS PCT % 7   EOS PCT % 0     Results from last 7 days   Lab Units 09/20/19 1910   SODIUM mmol/L 138   POTASSIUM mmol/L 3 7   CHLORIDE mmol/L 105   CO2 mmol/L 25   BUN mg/dL 8   CREATININE mg/dL 0 66   ANION GAP mmol/L 8   CALCIUM mg/dL 9 3   ALBUMIN g/dL 3 9   TOTAL BILIRUBIN mg/dL 0 40   ALK PHOS U/L 72   ALT U/L 32   AST U/L 31   GLUCOSE RANDOM mg/dL 125*     Results from last 7 days   Lab Units 09/17/19  2141   INR  0 92         Lab Results   Component Value Date/Time    HGBA1C 5 6 05/26/2019 06:26 AM    HGBA1C 5 5 05/17/2018 05:47 AM     Results from last 7 days   Lab Units 09/20/19  1835   POC GLUCOSE mg/dl 138     Results from last 7 days   Lab Units 09/17/19  2141   LACTIC ACID mmol/L 1 8       Imaging: I have personally reviewed pertinent reports  No orders to display       EKG, Pathology, and Other Studies Reviewed on Admission:   · EKG:  Reviewed    ** Please Note: This note has been constructed using a voice recognition system   **

## 2019-09-22 LAB
CHOLEST SERPL-MCNC: 154 MG/DL
HDLC SERPL-MCNC: 51 MG/DL (ref 40–59)
LDLC SERPL CALC-MCNC: 74 MG/DL
NONHDLC SERPL-MCNC: 103 MG/DL
TRIGL SERPL-MCNC: 147 MG/DL
TSH SERPL DL<=0.05 MIU/L-ACNC: 3.02 UIU/ML (ref 0.47–4.68)

## 2019-09-22 PROCEDURE — 80061 LIPID PANEL: CPT | Performed by: PHYSICIAN ASSISTANT

## 2019-09-22 PROCEDURE — 99232 SBSQ HOSP IP/OBS MODERATE 35: CPT | Performed by: PSYCHIATRY & NEUROLOGY

## 2019-09-22 PROCEDURE — 84443 ASSAY THYROID STIM HORMONE: CPT | Performed by: PHYSICIAN ASSISTANT

## 2019-09-22 RX ADMIN — NICOTINE POLACRILEX 2 MG: 2 GUM, CHEWING ORAL at 13:38

## 2019-09-22 RX ADMIN — TRAZODONE HYDROCHLORIDE 50 MG: 50 TABLET ORAL at 22:34

## 2019-09-22 RX ADMIN — FOLIC ACID 1 MG: 1 TABLET ORAL at 09:38

## 2019-09-22 RX ADMIN — SUCRALFATE 1 G: 1 TABLET ORAL at 23:00

## 2019-09-22 RX ADMIN — Medication 1 TABLET: at 09:38

## 2019-09-22 RX ADMIN — NALTREXONE HYDROCHLORIDE 50 MG: 50 TABLET, FILM COATED ORAL at 09:38

## 2019-09-22 RX ADMIN — SUCRALFATE 1 G: 1 TABLET ORAL at 06:22

## 2019-09-22 RX ADMIN — LURASIDONE HYDROCHLORIDE 40 MG: 40 TABLET, FILM COATED ORAL at 16:00

## 2019-09-22 RX ADMIN — NICOTINE 1 PATCH: 14 PATCH, EXTENDED RELEASE TRANSDERMAL at 09:41

## 2019-09-22 RX ADMIN — LISINOPRIL 20 MG: 20 TABLET ORAL at 09:41

## 2019-09-22 RX ADMIN — GABAPENTIN 300 MG: 300 CAPSULE ORAL at 09:38

## 2019-09-22 RX ADMIN — PANTOPRAZOLE SODIUM 40 MG: 40 TABLET, DELAYED RELEASE ORAL at 06:22

## 2019-09-22 RX ADMIN — HYDROXYZINE HYDROCHLORIDE 50 MG: 50 TABLET ORAL at 18:28

## 2019-09-22 RX ADMIN — TRAZODONE HYDROCHLORIDE 50 MG: 50 TABLET ORAL at 21:40

## 2019-09-22 RX ADMIN — GABAPENTIN 300 MG: 300 CAPSULE ORAL at 16:00

## 2019-09-22 RX ADMIN — CLONIDINE HYDROCHLORIDE 0.1 MG: 0.1 TABLET ORAL at 09:38

## 2019-09-22 RX ADMIN — SUCRALFATE 1 G: 1 TABLET ORAL at 16:00

## 2019-09-22 RX ADMIN — HYDROCHLOROTHIAZIDE 25 MG: 25 TABLET ORAL at 09:38

## 2019-09-22 RX ADMIN — AMLODIPINE BESYLATE 5 MG: 5 TABLET ORAL at 09:38

## 2019-09-22 RX ADMIN — GABAPENTIN 300 MG: 300 CAPSULE ORAL at 21:39

## 2019-09-22 RX ADMIN — THIAMINE HCL TAB 100 MG 100 MG: 100 TAB at 09:38

## 2019-09-22 RX ADMIN — ESCITALOPRAM OXALATE 10 MG: 10 TABLET ORAL at 09:38

## 2019-09-22 RX ADMIN — CLONIDINE HYDROCHLORIDE 0.1 MG: 0.1 TABLET ORAL at 17:18

## 2019-09-22 RX ADMIN — ATORVASTATIN CALCIUM 40 MG: 40 TABLET, FILM COATED ORAL at 16:00

## 2019-09-22 NOTE — PROGRESS NOTES
Patient denies SI/HI and denies A/V hallucinations  Patient is medication compliant  Patients blood pressure has been good despite patient reporting overdosing on labetalol  Patient walking halls and social with [address] speaking peers

## 2019-09-22 NOTE — PROGRESS NOTES
Progress Note - [address] 48 y o  female MRN: [id number]  Unit/Bed#: Komal Webb 343-01 Encounter: [id number]  Staff reported PT interacting with others in the milieu  When I talked to PT she stated feels better, she knows she needs to be compliant with medications and has been talking to mother about going together to outpatient doctor  Denied suicidal thoughts or plants      Behavior over the last 24 hours:  improved  Sleep: taking Trazodone  Appetite: normal  Medication side effects: No  ROS: no complaints    Medications:   Current Facility-Administered Medications   Medication Dose Route Frequency Provider Last Rate Last Dose    acetaminophen (TYLENOL) tablet 650 mg  650 mg Oral Q6H PRN Tricia Mark MD        acetaminophen (TYLENOL) tablet 650 mg  650 mg Oral Q4H PRN Tricia Mark MD        acetaminophen (TYLENOL) tablet 975 mg  975 mg Oral Q6H PRN Tricia Mark MD        aluminum-magnesium hydroxide-simethicone (MYLANTA) 200-200-20 mg/5 mL oral suspension 30 mL  30 mL Oral Q4H PRN Tricia Mark MD        amLODIPine (NORVASC) tablet 5 mg  5 mg Oral Daily Tricia Mark MD   5 mg at 09/21/19 0834    atorvastatin (LIPITOR) tablet 40 mg  40 mg Oral Daily With [employer] ENOC Morgan PA-C   40 mg at 09/21/19 1732    cloNIDine (CATAPRES) tablet 0 1 mg  0 1 mg Oral BID Tricia Mark MD   0 1 mg at 09/21/19 1732    escitalopram (LEXAPRO) tablet 10 mg  10 mg Oral Daily Jeff Batista MD   10 mg at 39/53/83 0286    folic acid (FOLVITE) tablet 1 mg  1 mg Oral Daily Tricia Mark MD   1 mg at 09/21/19 0834    gabapentin (NEURONTIN) capsule 300 mg  300 mg Oral TID Jeff Batista MD   300 mg at 09/21/19 2107    haloperidol (HALDOL) tablet 5 mg  5 mg Oral Q8H PRN Tricia Mark MD        haloperidol lactate (HALDOL) injection 5 mg  5 mg Intramuscular Q8H PRN Tricia Mark MD        hydrochlorothiazide (HYDRODIURIL) tablet 25 mg  25 mg Oral Daily Milagros Morgan PA-C   25 mg at 09/21/19 1239    hydrOXYzine HCL (ATARAX) tablet 50 mg  50 mg Oral Q6H PRN Tricia Mark MD   50 mg at 09/21/19 0834    levothyroxine tablet 50 mcg  50 mcg Oral Daily Priscilla Tolliver MD        lisinopril (ZESTRIL) tablet 20 mg  20 mg Oral Daily Milagros Morgan PA-C   20 mg at 09/21/19 1239    LORazepam (ATIVAN) tablet 2 mg  2 mg Oral Q4H PRN Tricia Mark MD        lurasidone (LATUDA) tablet 40 mg  40 mg Oral Daily With Alfa De La Cruz MD   40 mg at 09/21/19 1732    magnesium hydroxide (MILK OF MAGNESIA) 400 mg/5 mL oral suspension 30 mL  30 mL Oral Daily PRN Tricia Mark MD        multivitamin-minerals (CENTRUM) tablet 1 tablet  1 tablet Oral Daily Tricia Mark MD   1 tablet at 09/21/19 0834    naltrexone (REVIA) tablet 50 mg  50 mg Oral Daily Priscilla Tolliver MD   50 mg at 09/21/19 1238    nicotine (NICODERM CQ) 14 mg/24hr TD 24 hr patch 1 patch  1 patch Transdermal Daily Priscilla Tolliver MD   1 patch at 09/21/19 1240    nicotine polacrilex (NICORETTE) gum 2 mg  2 mg Oral Q2H PRN Tricia Mark MD   2 mg at 09/21/19 1741    pantoprazole (PROTONIX) EC tablet 40 mg  40 mg Oral Early Morning Milagros Morgan PA-C   40 mg at 09/22/19 0622    sucralfate (CARAFATE) tablet 1 g  1 g Oral 4x Daily (AC & HS) Milagros Morgan PA-C   1 g at 09/22/19 0354    thiamine (VITAMIN B1) tablet 100 mg  100 mg Oral Daily Tricia Mark MD   100 mg at 09/21/19 0834    traZODone (DESYREL) tablet 50 mg  50 mg Oral HS PRN Priscilla Tolliver MD   50 mg at 09/21/19 7187     Medications Prior to Admission   Medication    amLODIPine (NORVASC) 5 mg tablet    cloNIDine (CATAPRES) 0 1 mg tablet    escitalopram (LEXAPRO) 10 mg tablet    gabapentin (NEURONTIN) 300 mg capsule    levothyroxine 50 mcg tablet    lisinopril 20 mg TABS 20 mg, hydrochlorothiazide 25 mg TABS 25 mg    lurasidone (LATUDA) 40 mg tablet    metoprolol tartrate (LOPRESSOR) 50 mg tablet    naltrexone (REVIA) 50 mg tablet    pantoprazole (PROTONIX) 40 mg tablet    rosuvastatin (CRESTOR) 20 MG tablet    traZODone (DESYREL) 50 mg tablet       Labs:   Admission on 09/20/2019   Component Date Value    WBC 09/20/2019 11 10*    RBC 09/20/2019 4 21     Hemoglobin 09/20/2019 13 8     Hematocrit 09/20/2019 40 2     MCV 09/20/2019 96     MCH 09/20/2019 32 9     MCHC 09/20/2019 34 4     RDW 09/20/2019 14 3     MPV 09/20/2019 7 6*    Platelets 90/12/6433 275     Neutrophils Relative 09/20/2019 70*    Lymphocytes Relative 09/20/2019 23*    Monocytes Relative 09/20/2019 7     Eosinophils Relative 09/20/2019 0     Basophils Relative 09/20/2019 0     Neutrophils Absolute 09/20/2019 7 80     Lymphocytes Absolute 09/20/2019 2 60     Monocytes Absolute 09/20/2019 0 70     Eosinophils Absolute 09/20/2019 0 00     Basophils Absolute 09/20/2019 0 00     Sodium 09/20/2019 138     Potassium 09/20/2019 3 7     Chloride 09/20/2019 105     CO2 09/20/2019 25     ANION GAP 09/20/2019 8     BUN 09/20/2019 8     Creatinine 09/20/2019 0 66     Glucose 09/20/2019 125*    Calcium 09/20/2019 9 3     AST 09/20/2019 31     ALT 09/20/2019 32     Alkaline Phosphatase 09/20/2019 72     Total Protein 09/20/2019 6 6     Albumin 09/20/2019 3 9     Total Bilirubin 09/20/2019 0 40     eGFR 09/20/2019 103     EXT PREG TEST UR (Ref: N* 09/20/2019 negative     Control 09/20/2019 valid     Amph/Meth UR 09/20/2019 Negative     Barbiturate Ur 09/20/2019 Negative     Benzodiazepine Urine 09/20/2019 Negative     Cocaine Urine 09/20/2019 Negative     Methadone Urine 09/20/2019 Negative     Opiate Urine 09/20/2019 Negative     PCP Ur 09/20/2019 Negative     THC Urine 09/20/2019 Negative     Color, UA 09/20/2019 Straw     Clarity, UA 09/20/2019 Clear     Specific Gravity, UA 09/20/2019 1 010     pH, UA 09/20/2019 7 0     Leukocytes, UA 09/20/2019 Negative     Nitrite, UA 09/20/2019 Negative     Protein, UA 09/20/2019 Negative     Glucose, UA 09/20/2019 Negative     Ketones, UA 09/20/2019 Negative     Bilirubin, UA 09/20/2019 Negative     Blood, UA 09/20/2019 Negative     UROBILINOGEN UA 09/20/2019 Negative     EXTBreath Alcohol 09/20/2019 0 049     Acetaminophen Level 09/20/2019 <10*    TSH 3RD GENERATON 66/71/2751 5 577*    Salicylate Lvl 60/39/4710 <1 0*    POC Glucose 09/20/2019 138     Ventricular Rate 09/20/2019 85     Atrial Rate 09/20/2019 85     CO Interval 09/20/2019 276     QRSD Interval 09/20/2019 76     QT Interval 09/20/2019 380     QTC Interval 09/20/2019 452     P Axis 09/20/2019 56     QRS Axis 09/20/2019 15     T Wave Axis 09/20/2019 17     TSH 3RD GENERATON 09/22/2019 3 020     Cholesterol 09/22/2019 154     Triglycerides 09/22/2019 147     HDL, Direct 09/22/2019 51     LDL Calculated 09/22/2019 74     Non-HDL-Chol (CHOL-HDL) 09/22/2019 103        Mental Status Evaluation:  Appearance:  age appropriate   Behavior:  cooperative   Speech:  normal rate and rthythm   Mood:  less depressed   Affect:  normal and brighter   Associations: intact associations   Thought Process:  coherent   Thought Content:  No overt delusions   Perceptual Disturbances: None   Risk Potential: denied suicidal thoughts or plans   Sensorium:  person, place and time/date   Memory recent and remote memory grossly intact   Consciousness:  alert    Attention: attention span appeared shorter than expected for age   Insight:  improving   Judgment: improving   Gait/Station: normal gait/station   Motor Activity: no abnormal movements     Progress Toward Goals: PT has been doing better, she has been talking to staff,  Interacting with peers and talking about how to continue to improve      Assessment/Plan   Principal Problem:    Reported medication overdose self-harm  Active Problems:    Essential hypertension    Anxiety    Hypothyroidism    Gastritis    Alcohol use disorder, severe, dependence ([facility] )    [facility]    PTSD (post-traumatic stress disorder)    Bipolar affective disorder, depressed, severe ([facility] )      Recommended Treatment: Continue with group therapy, milieu therapy and occupational therapy  Risks, benefits and possible side effects of Medications:   Risks, benefits, and possible side effects of medications explained to patient and patient verbalizes understanding  Counseling / Coordination of Care  Total floor / unit time spent today 20 minutes  Greater than 50% of total time was spent with the patient and / or family counseling and / or coordination of care  A description of the counseling / coordination of care: supportive psychotherapy addressing how to keep safe  And prevent relapses

## 2019-09-22 NOTE — PROGRESS NOTES
Pt denies SI  She admits to feeling sad and appears depressed  She had anxiety earlier but states the Atarax she received earlier was helpful  Pt is more seclusive to room this evening  She is cooperative  Will continue to monitor

## 2019-09-23 PROCEDURE — 99231 SBSQ HOSP IP/OBS SF/LOW 25: CPT | Performed by: STUDENT IN AN ORGANIZED HEALTH CARE EDUCATION/TRAINING PROGRAM

## 2019-09-23 RX ORDER — TRAZODONE HYDROCHLORIDE 100 MG/1
100 TABLET ORAL
Status: DISCONTINUED | OUTPATIENT
Start: 2019-09-23 | End: 2019-09-26 | Stop reason: HOSPADM

## 2019-09-23 RX ORDER — ESCITALOPRAM OXALATE 10 MG/1
15 TABLET ORAL DAILY
Status: DISCONTINUED | OUTPATIENT
Start: 2019-09-24 | End: 2019-09-26 | Stop reason: HOSPADM

## 2019-09-23 RX ADMIN — THIAMINE HCL TAB 100 MG 100 MG: 100 TAB at 08:12

## 2019-09-23 RX ADMIN — FOLIC ACID 1 MG: 1 TABLET ORAL at 08:11

## 2019-09-23 RX ADMIN — GABAPENTIN 300 MG: 300 CAPSULE ORAL at 21:21

## 2019-09-23 RX ADMIN — ATORVASTATIN CALCIUM 40 MG: 40 TABLET, FILM COATED ORAL at 17:11

## 2019-09-23 RX ADMIN — CLONIDINE HYDROCHLORIDE 0.1 MG: 0.1 TABLET ORAL at 17:11

## 2019-09-23 RX ADMIN — PANTOPRAZOLE SODIUM 40 MG: 40 TABLET, DELAYED RELEASE ORAL at 06:22

## 2019-09-23 RX ADMIN — NICOTINE POLACRILEX 2 MG: 2 GUM, CHEWING ORAL at 13:26

## 2019-09-23 RX ADMIN — CLONIDINE HYDROCHLORIDE 0.1 MG: 0.1 TABLET ORAL at 08:11

## 2019-09-23 RX ADMIN — SUCRALFATE 1 G: 1 TABLET ORAL at 21:21

## 2019-09-23 RX ADMIN — GABAPENTIN 300 MG: 300 CAPSULE ORAL at 08:11

## 2019-09-23 RX ADMIN — NICOTINE 1 PATCH: 14 PATCH, EXTENDED RELEASE TRANSDERMAL at 08:13

## 2019-09-23 RX ADMIN — LURASIDONE HYDROCHLORIDE 40 MG: 40 TABLET, FILM COATED ORAL at 17:11

## 2019-09-23 RX ADMIN — NICOTINE POLACRILEX 2 MG: 2 GUM, CHEWING ORAL at 17:56

## 2019-09-23 RX ADMIN — Medication 1 TABLET: at 08:11

## 2019-09-23 RX ADMIN — HYDROCHLOROTHIAZIDE 25 MG: 25 TABLET ORAL at 08:11

## 2019-09-23 RX ADMIN — SUCRALFATE 1 G: 1 TABLET ORAL at 17:11

## 2019-09-23 RX ADMIN — SUCRALFATE 1 G: 1 TABLET ORAL at 12:09

## 2019-09-23 RX ADMIN — SUCRALFATE 1 G: 1 TABLET ORAL at 06:22

## 2019-09-23 RX ADMIN — LISINOPRIL 20 MG: 20 TABLET ORAL at 08:12

## 2019-09-23 RX ADMIN — ESCITALOPRAM OXALATE 10 MG: 10 TABLET ORAL at 08:11

## 2019-09-23 RX ADMIN — AMLODIPINE BESYLATE 5 MG: 5 TABLET ORAL at 08:12

## 2019-09-23 RX ADMIN — TRAZODONE HYDROCHLORIDE 100 MG: 100 TABLET ORAL at 21:24

## 2019-09-23 RX ADMIN — NALTREXONE HYDROCHLORIDE 50 MG: 50 TABLET, FILM COATED ORAL at 08:12

## 2019-09-23 RX ADMIN — GABAPENTIN 300 MG: 300 CAPSULE ORAL at 17:11

## 2019-09-23 RX ADMIN — LEVOTHYROXINE SODIUM 50 MCG: 50 TABLET ORAL at 06:22

## 2019-09-23 NOTE — PROGRESS NOTES
09/23/19 1115   Activity/Group Checklist   Group   (recovery group)   Attendance Attended   Attendance Duration (min) 46-60   Interactions Interacted appropriately   Affect/Mood Appropriate   Goals Achieved Able to listen to others; Able to engage in interactions; Able to self-disclose   Exercise the coat if arms- she appeared guarded in her self disclosure

## 2019-09-23 NOTE — PLAN OF CARE
Problem: SELF HARM/SUICIDALITY  Goal: Will have no self-injury during hospital stay  Description  INTERVENTIONS:  - Q 15 MINUTES: Routine safety checks  - Q WAKING SHIFT & PRN: Assess risk to determine if routine checks are adequate to maintain patient safety  - Encourage patient to participate actively in care by formulating a plan to combat response to suicidal ideation, identify supports and resources  Outcome: Progressing     Problem: DEPRESSION  Goal: Will be euthymic at discharge  Description  INTERVENTIONS:  - Administer medication as ordered  - Provide emotional support via 1:1 interaction with staff  - Encourage involvement in milieu/groups/activities  - Monitor for social isolation  Outcome: Progressing     Problem: ANXIETY  Goal: Will report anxiety at manageable levels  Description  INTERVENTIONS:  - Administer medication as ordered  - Teach and encourage coping skills  - Provide emotional support  - Assess patient/family for anxiety and ability to cope  Outcome: Progressing     Problem: SUBSTANCE USE/ABUSE  Goal: Will have no detox symptoms and will verbalize plan for changing substance-related behavior  Description  INTERVENTIONS:  - Monitor physical status and assess for symptoms of withdrawal  - Administer medication as ordered  - Provide emotional support with 1 on 1 interaction with staff  - Encourage recovery focused program/ addiction education  - Assess for verbalization of changing behaviors related to substance abuse  - Initiate consults and referrals as appropriate (Case Management, Spiritual Care, etc )  Outcome: Progressing     Problem: ANXIETY  Goal: By discharge: Patient will verbalize 2 strategies to deal with anxiety  Description  Interventions:  - Identify any obvious source/trigger to anxiety  - Staff will assist patient in applying identified coping technique/skills  - Encourage attendance of scheduled groups and activities  Outcome: Not Progressing

## 2019-09-23 NOTE — PROGRESS NOTES
09/23/19 0935   Team Meeting   Meeting Type Tx Team Meeting   Initial Conference Date 09/23/19   Next Conference Date 10/23/19   Team Members Present   Team Members Present Physician;Nurse;;; Occupational Therapist   Physician Team Member Misty Naik   Nursing Team Member [facility] Management Team Member Gorge   Social Work Team Member Marcos   Other (Discipline and Name) Zack   Patient/Family Present   Patient Present Yes   Possible DC by Friday this week  Patient rescinded 72H notice  Treatment plan reviewed and signed with patient  Continue med management

## 2019-09-23 NOTE — PROGRESS NOTES
Progress Note - Behavioral Health   Julia Fernandez 48 y o  female MRN: [id number]  Unit/Bed#: Mariana Teague 343-01 Encounter: [id number]    The patient was seen for continuing care and reviewed with treatment team   Patient seen in treatment team meeting  Patient stated that she missed her appointment at the outpatient [facility] and she was out of medications for 1 month  Patient reports that she took several pills of blood pressure medications before calling 911  Patient continues to endorse anxiety symptoms with nervousness  Patient denies suicidal or homicidal ideation at this time  Patient reports fair sleep on trazodone last night  Patient reports good appetite  Patient denies auditory or visual hallucinations  No paranoia elicited    Patient denies side effects with the      Mental Status Evaluation:    Appearance:  casually dressed   Behavior:  cooperative   Speech:  normal rate and volume   Mood:  depressed, anxious   Affect:  constricted   Thought Process:  coherent   Associations: concrete associations   Thought Content:  no overt delusions   Perceptual Disturbances: no auditory hallucinations, no visual hallucinations   Risk Potential: Suicidal ideation - None  Homicidal ideation - None  Potential for aggression - No   Sensorium:  oriented to person, place and time/date   Memory:  recent and remote memory grossly intact   Consciousness:  alert and awake   Attention: attention span and concentration appear shorter than expected for age   Insight:  limited   Judgment: limited   Gait/Station: normal gait/station   Motor Activity: no abnormal movements       Vitals:    09/23/19 0732   BP: 153/92   Pulse: 90   Resp: 17   Temp: 97 6 °F (36 4 °C)   SpO2:        Assessment/Plan    Principal Problem:    Reported medication overdose self-harm  Active Problems:    Essential hypertension    Anxiety    Hypothyroidism    Gastritis    Alcohol use disorder, severe, dependence ([facility])    h/o Seizure ([address] )    PTSD (post-traumatic stress disorder)    Bipolar affective disorder, depressed, severe ([facility] )      Recommended Treatment:  Uptitrate Lexapro to 15 mg tablet oral daily for depression and anxiety  Continue other present medications  Continue [facility] monitoring  Patient needs drug and alcohol assessment  Continue with pharmacotherapy, group therapy, milieu therapy and occupational therapy    The patient will be maintained on the following medications:    Current Facility-Administered Medications:  acetaminophen 650 mg Oral Q6H PRN Alissaslav P MD Deedee   acetaminophen 650 mg Oral Q4H PRN Raulitoetoslav P MD Deedee   acetaminophen 975 mg Oral Q6H PRN Svetoslav P MD Deedee   aluminum-magnesium hydroxide-simethicone 30 mL Oral Q4H PRN Alissaslav P MD Deedee   amLODIPine 5 mg Oral Daily Alissasaisha Mark MD   atorvastatin 40 mg Oral Daily With [employer] ENOC Roberson PA-C   cloNIDine 0 1 mg Oral BID Alissasaisha Mark MD   escitalopram 10 mg Oral Daily Charlene Herrera MD   folic acid 1 mg Oral Daily Alissaslav ALEXIS Mark MD   gabapentin 300 mg Oral TID Charlene Herrera MD   haloperidol 5 mg Oral Q8H PRN Alissasaisha Mark MD   haloperidol lactate 5 mg Intramuscular Q8H PRN Raulitoetoslav P MD Deedee   hydrochlorothiazide 25 mg Oral Daily Milagros Morgan PA-C   hydrOXYzine HCL 50 mg Oral Q6H PRN Alissaslav P MD Deedee   levothyroxine 50 mcg Oral Daily Charlene Herrera MD   lisinopril 20 mg Oral Daily Milagros Morgan PA-C   LORazepam 2 mg Oral Q4H PRN Alissaslav ALEXIS Mark MD   lurasidone 40 mg Oral Daily With Feliberto Stein MD   magnesium hydroxide 30 mL Oral Daily PRN Alissaslav P MD Deedee   multivitamin-minerals 1 tablet Oral Daily Alissasaisha Mark MD   naltrexone 50 mg Oral Daily Charlene Herrera MD   nicotine 1 patch Transdermal Daily Charlene Herrera MD   nicotine polacrilex 2 mg Oral Q2H PRN Alissasaisha Mark MD   pantoprazole 40 mg Oral Early Morning Milagros Morgan PA-C   sucralfate 1 g Oral 4x Daily (AC & HS) Milagros Morgan PA-C   thiamine 100 mg Oral Daily Tricia Mark MD   traZODone 50 mg Oral HS PRN Mir Awan MD

## 2019-09-23 NOTE — PROGRESS NOTES
Pharmacy Medication Education Group     Patient came to group and wanted to know more about Latuda and also Synthroid thyroid medication especially the weight gain aspect  Patient was counseled on this medication and also Synthroid  It was explained to the patient that Minerva Luquillo has a lower chance of causing weight gain and that the mechanism is through hunger  As long as the patient follows an appropriate diet there is no reason they have to gain weight  Patient was also counseled on hypothyroidism and how synthroid works to restore balance  Patient was behaviorally and emotionally appropriate for this period of group       Faheem Posada, PharmD, BCPP, Clinical Pharmacist - Psychiatry

## 2019-09-23 NOTE — PROGRESS NOTES
Pt has not been able to fall asleep and requested her repeat dose of Trazodone; same given  Will monitor

## 2019-09-23 NOTE — PROGRESS NOTES
Patient denies SI/HI, A/V hallucinations  Denies depression and anxiety at this time  Cooperative and compliant with medications  Seen on the milieu social and interacting with [place] speaking patients

## 2019-09-23 NOTE — DISCHARGE INSTR - OTHER ORDERS
HOW TO GET SUBSTANCE ABUSE HELP:  If you or someone you know has a drug or alcohol problem, there is help:  Lizeth 44: [address]: [phone]  An assessment is the first step  In addition to those listed there are other programs available in the area but assessment is best to determine an appropriate level of care  If you DO NOT have Medical Assistance ([place]) or [employer], an assessment can be scheduled at one of these providers:  425 [facility] Kenisha Elliott 13, [address]  [phone]   101 [facility] [address] , [place], [address]  3314 [facility] [address]  [address] [place], [address]   Step by 8012 [facility] [address] , [place], 98 [facility]  [phone]   Treatment Trends  [place]  1320 [facility] , [place], 98 [facility]  2000 [facility],[address] , [address], [place], [address] [place] [phone]     If you [address], an assessment can be scheduled at one of these providers:  [place] [facility]  [address] , [place], 98 [facility]  [address]  Kenisha [address] 13, [address]  [address]  D&A Intake Unit  620 [facility] [address] , 1st Floor, [facility], [address]  [address], 300 [facility],6Th Floor, [place], [address] [place] [address] [place] [address] [facility] [address] , [place], [address]  3314 [facility] [address]  [facility], 122 [facility]  57 [facility], [facility], [address] 75 [place] [address] [place], [address]   Step by 1353 [facility] [address] , [place], 98 [facility] [place] [phone]   Treatment Trends  [place]  1320 [facility] , [place], 98 [facility]  2000 [facility],[address] 100 [facility] , [address], [place], [address] Maribel Ontiveros [phone]     If you [facility], an assessment can be scheduled at one of these providers  Please contact these Providers to determine if they are in your network plan:  [facility] D&A Intake Unit  620 [facility] [address] , 1st Floor, [facility], [address]  [facility] [address] , [place], [address]  [facility] [address]  [facility], 122 [facility] 57 [facility], [facility], [address] 75 [facility] [address] [place] [address], 2042 [facility] 2215 [facility] , [address], [place], [address] [place] [address] Crisis Phone Number : 401 [address] [place] [address] Crisis Phone Number : [phone]    National Suicide Hotline : 1 209.987.2548    Crisis Text Line : 967228          The [facility] Family-to-Family Education Program is a free 12-week (2 1/2 hours/week) course for families of individuals with severe brain disorders (mental illnesses)  The classes are taught by trained family members  All course materials are furnished at no cost to you  Below are some details  To register, e-mail [email or web address] or call [phone]  The curriculum focuses on schizophrenia, bipolar disorder (manic depression), clinical depression, panic disorders and obsessive-compulsive disorder (OCD)  The course discusses the clinical treatment of these illnesses and teaches the knowledge and skills that family members need to cope more effectively    Topics Include:  Learning about feelings, learning about facts   Schizophrenia, major depression and doc: diagnosis and dealing with critical periods   Subtypes of depression and bipolar disorder, panic disorder and OCD; diagnosis and causes; sharing our stories   The biology of the brain/new research   Problem solving workshops   Medication review   Empathy workshop  what its like to have a brain disorder   Communication skills workshop   Self-care and relative groups   Rehabilitation, services available   Advocacy: fighting stigma   Review and certification ceremony    Ggnu-gt-Ilup Education Course  The [employer] Education class is a ten week  two hours per week  experiential education course on the topic of recovery for any person with serious mental illness who is interested in establishing and maintaining wellness  The course uses a combination of lecture, interactive exercises, and structural group processes  The diversity of experience among participants affords for a lively dynamic that moves the course along  [employer]s Llkc[employer]ch[employer]op[employer] Education class is offered free of charge to people who experience mental illness  You do not need to be a member of [facility] to take the course  Courses are taught by teams of trained mentors/peer-teachers who are themselves experienced at living well with mental illness  Below are some details  To register, call [phone] or e-mail [email or web address]  Sign up today! 225 [facility] group is for family members, caregivers, and loved ones of individuals living with the everyday challenges of mental illness  The leaders are family members in the same situation  Sessions take place in an intimate, confidential setting to allow families to share openly with each other  These support groups allow participants to learn from the experiences of other group members, share coping strategies, and offer each other encouragement and understanding  Ericka Burciaga know that you are not alone  Drop inno registration is necessary  Here are the times and locations    MARIANNE  Monthly: 3rd Monday, 7:00-8:30 pm  [place]  [address]  Monthly: 4th Tuesday, 7:00-8:30 pm  179 [facility] Arleth Kendrick  Monthly: 1st Monday, 7:00-8:30 pm  [facility]  3001 [facility], [address] [place] [address]         Monthly Support for Persons with Mental Illness  The Peer Support Group is a monthly meeting for individuals facing the challenges of recovering from severe and persistent mental illness  Depression, manic depression, schizophrenia, and general anxiety disorder are only a few of the diagnoses of individuals who have found a supportive place at our meetings  Our [place]  We are a fellowship of individuals who share a common goal of recovery and the ability to maintain mental and emotional stability  We help others and ourselves through sharing our experiences, strength and hope with each other  No matter how traumatic our past or how despairing our present may be, there is hope for a new day  Sessions take place in an intimate, confidential setting to allow individuals to share openly with each other  Bee Ortega know that you are not alone  Drop inno registration is necessary  Here are the times and locations    PATRIC  Monthly: 1st Monday, 7:00-8:30 pm  [facility]  [address] [place], [place]   [id number]  Monthly: 3rd Monday, 7:00-8:30 pm  [address]  1800 [facility]

## 2019-09-23 NOTE — CONSULTS
Consultation - Amber Medico 48 y o  female MRN: [id number]  Unit/Bed#: Kami Theodore 343-01 Encounter: [id number]          History of Present Illness   Physician Requesting Consult: Mat Rocha MD  Reason for Consult / Principal Problem: Tooth pain    HPI:  Thomas Baca is a 48 y o  female with pmh of bipolar disorder, anxiety, and alcohol use was admitted last week to the inpatient psychiatric unit  OMFS was consulted for tooth pain  Patient states that her upper right molar has been hurting for the past few years with intermittent flair ups  Pain is 5/10 currently, sharp intermittent pain  Pain is controlled with NSAIDs  Patient is able to eat without problems  Patient denies nausea, vomiting, chill, fever  Inpatient consult to Dentist     Performed by  Shital Estrella DDS     Authorized by Osvaldo Eller MD              Review of Systems   Constitutional: Negative  HENT: Positive for dental problem  Negative for facial swelling, nosebleeds and trouble swallowing  Eyes: Negative  Respiratory: Negative  Cardiovascular: Negative  Gastrointestinal: Negative  Endocrine: Negative  Genitourinary: Negative  Musculoskeletal: Negative  Skin: Negative  Allergic/Immunologic: Negative  Neurological: Negative  Hematological: Negative  Psychiatric/Behavioral: Negative  All other systems reviewed and are negative        Historical Information   Past Medical History:   Diagnosis Date    Addiction to drug ([facility] [address] )     Alcoholism ([facility] [address] )     Anxiety     Bowel obstruction ([facility] [address] )     Depression     Gastritis     Hypertension     Psychiatric illness     PTSD (post-traumatic stress disorder)     Suicide attempt [facility])      Past Surgical History:   Procedure Laterality Date    ABDOMINAL SURGERY      APPENDECTOMY      BOWEL RESECTION      CHOLECYSTECTOMY      ESOPHAGOGASTRODUODENOSCOPY N/A 5/18/2018    Procedure: ESOPHAGOGASTRODUODENOSCOPY (EGD) with bx;  Surgeon: Carina Khalil DO Brett;  Location: [facility]; Service: Gastroenterology    HYSTERECTOMY      KNEE SURGERY Bilateral      Social History   Social History     Substance and Sexual Activity   Alcohol Use Yes    Frequency: 4 or more times a week    Drinks per session: 7 to 9    Binge frequency: Daily or almost daily    Comment: 8 large glasses of beer daily     Social History     Substance and Sexual Activity   Drug Use No     Social History     Tobacco Use   Smoking Status Current Every Day Smoker    Packs/day: 0 50    Types: Cigarettes   Smokeless Tobacco Never Used     History reviewed  No pertinent family history      Meds/Allergies   Current Facility-Administered Medications   Medication Dose Route Frequency    acetaminophen (TYLENOL) tablet 650 mg  650 mg Oral Q6H PRN    acetaminophen (TYLENOL) tablet 650 mg  650 mg Oral Q4H PRN    acetaminophen (TYLENOL) tablet 975 mg  975 mg Oral Q6H PRN    aluminum-magnesium hydroxide-simethicone (MYLANTA) 200-200-20 mg/5 mL oral suspension 30 mL  30 mL Oral Q4H PRN    amLODIPine (NORVASC) tablet 5 mg  5 mg Oral Daily    atorvastatin (LIPITOR) tablet 40 mg  40 mg Oral Daily With Dinner    cloNIDine (CATAPRES) tablet 0 1 mg  0 1 mg Oral BID    [START ON 9/24/2019] escitalopram (LEXAPRO) tablet 15 mg  15 mg Oral Daily    folic acid (FOLVITE) tablet 1 mg  1 mg Oral Daily    gabapentin (NEURONTIN) capsule 300 mg  300 mg Oral TID    haloperidol (HALDOL) tablet 5 mg  5 mg Oral Q8H PRN    haloperidol lactate (HALDOL) injection 5 mg  5 mg Intramuscular Q8H PRN    hydrochlorothiazide (HYDRODIURIL) tablet 25 mg  25 mg Oral Daily    hydrOXYzine HCL (ATARAX) tablet 50 mg  50 mg Oral Q6H PRN    levothyroxine tablet 50 mcg  50 mcg Oral Daily    lisinopril (ZESTRIL) tablet 20 mg  20 mg Oral Daily    LORazepam (ATIVAN) tablet 2 mg  2 mg Oral Q4H PRN    lurasidone (LATUDA) tablet 40 mg  40 mg Oral Daily With Dinner    magnesium hydroxide (MILK OF MAGNESIA) 400 mg/5 mL oral suspension 30 mL  30 mL Oral Daily PRN    multivitamin-minerals (CENTRUM) tablet 1 tablet  1 tablet Oral Daily    naltrexone (REVIA) tablet 50 mg  50 mg Oral Daily    nicotine (NICODERM CQ) 14 mg/24hr TD 24 hr patch 1 patch  1 patch Transdermal Daily    nicotine polacrilex (NICORETTE) gum 2 mg  2 mg Oral Q2H PRN    pantoprazole (PROTONIX) EC tablet 40 mg  40 mg Oral Early Morning    sucralfate (CARAFATE) tablet 1 g  1 g Oral 4x Daily (AC & HS)    thiamine (VITAMIN B1) tablet 100 mg  100 mg Oral Daily    traZODone (DESYREL) tablet 100 mg  100 mg Oral HS PRN     Medications Prior to Admission   Medication    amLODIPine (NORVASC) 5 mg tablet    cloNIDine (CATAPRES) 0 1 mg tablet    escitalopram (LEXAPRO) 10 mg tablet    gabapentin (NEURONTIN) 300 mg capsule    levothyroxine 50 mcg tablet    lisinopril 20 mg TABS 20 mg, hydrochlorothiazide 25 mg TABS 25 mg    lurasidone (LATUDA) 40 mg tablet    metoprolol tartrate (LOPRESSOR) 50 mg tablet    naltrexone (REVIA) 50 mg tablet    pantoprazole (PROTONIX) 40 mg tablet    rosuvastatin (CRESTOR) 20 MG tablet    traZODone (DESYREL) 50 mg tablet     Allergies   Allergen Reactions    Latex Rash       Objective   Vitals:   Blood Pressure: 128/66 (09/23/19 1500), Pulse: 69 (09/23/19 1500), Temperature: 98 °F (36 7 °C) (09/23/19 1500), Temp Source: Temporal (09/23/19 1500), Respirations: 16 (09/23/19 1500) Height and Weight Height: 5' 3" (160 cm) (09/21/19 0340), Height Method: Actual (09/21/19 0340), Weight - Scale: 80 5 kg (177 lb 6 4 oz) (09/21/19 0800), Weight Method: Standing scale (09/21/19 0800), BSA (Calculated - m2): 1 87 sq meters (09/21/19 0340), BMI (Calculated): 32 6 (09/21/19 0340), Weight in (lb) to have BMI = 25: 140 8 (09/21/19 0340), Waist circumference (inches): 39 Inches (09/21/19 0340), IBW: 52 4 kg (09/21/19 0800)      Physical Exam   Constitutional: She is oriented to person, place, and time  She appears well-developed and well-nourished  HENT:   Head: Normocephalic and atraumatic  Mouth/Throat: Uvula is midline, oropharynx is clear and moist and mucous membranes are normal  No trismus in the jaw  Dental caries present  No dental abscesses  Eyes: Pupils are equal, round, and reactive to light  EOM are normal    Neck: Normal range of motion  Cardiovascular: Normal rate and regular rhythm  Neurological: She is alert and oriented to person, place, and time  Nursing note and vitals reviewed        Lab Results:   Admission on 09/20/2019   Component Date Value    WBC 09/20/2019 11 10*    RBC 09/20/2019 4 21     Hemoglobin 09/20/2019 13 8     Hematocrit 09/20/2019 40 2     MCV 09/20/2019 96     MCH 09/20/2019 32 9     MCHC 09/20/2019 34 4     RDW 09/20/2019 14 3     MPV 09/20/2019 7 6*    Platelets 66/02/7233 275     Neutrophils Relative 09/20/2019 70*    Lymphocytes Relative 09/20/2019 23*    Monocytes Relative 09/20/2019 7     Eosinophils Relative 09/20/2019 0     Basophils Relative 09/20/2019 0     Neutrophils Absolute 09/20/2019 7 80     Lymphocytes Absolute 09/20/2019 2 60     Monocytes Absolute 09/20/2019 0 70     Eosinophils Absolute 09/20/2019 0 00     Basophils Absolute 09/20/2019 0 00     Sodium 09/20/2019 138     Potassium 09/20/2019 3 7     Chloride 09/20/2019 105     CO2 09/20/2019 25     ANION GAP 09/20/2019 8     BUN 09/20/2019 8     Creatinine 09/20/2019 0 66     Glucose 09/20/2019 125*    Calcium 09/20/2019 9 3     AST 09/20/2019 31     ALT 09/20/2019 32     Alkaline Phosphatase 09/20/2019 72     Total Protein 09/20/2019 6 6     Albumin 09/20/2019 3 9     Total Bilirubin 09/20/2019 0 40     eGFR 09/20/2019 103     EXT PREG TEST UR (Ref: N* 09/20/2019 negative     Control 09/20/2019 valid     Amph/Meth UR 09/20/2019 Negative     Barbiturate Ur 09/20/2019 Negative     Benzodiazepine Urine 09/20/2019 Negative     Cocaine Urine 09/20/2019 Negative     Methadone Urine 09/20/2019 Negative  Opiate Urine 09/20/2019 Negative     PCP Ur 09/20/2019 Negative     THC Urine 09/20/2019 Negative     Color, UA 09/20/2019 Straw     Clarity, UA 09/20/2019 Clear     Specific Gravity, UA 09/20/2019 1 010     pH, UA 09/20/2019 7 0     Leukocytes, UA 09/20/2019 Negative     Nitrite, UA 09/20/2019 Negative     Protein, UA 09/20/2019 Negative     Glucose, UA 09/20/2019 Negative     Ketones, UA 09/20/2019 Negative     Bilirubin, UA 09/20/2019 Negative     Blood, UA 09/20/2019 Negative     UROBILINOGEN UA 09/20/2019 Negative     EXTBreath Alcohol 09/20/2019 0 049     Acetaminophen Level 09/20/2019 <10*    TSH 3RD GENERATON 11/14/6865 9 107*    Salicylate Lvl 93/49/4245 <1 0*    POC Glucose 09/20/2019 138     Ventricular Rate 09/20/2019 85     Atrial Rate 09/20/2019 85     MS Interval 09/20/2019 276     QRSD Interval 09/20/2019 76     QT Interval 09/20/2019 380     QTC Interval 09/20/2019 452     P Axis 09/20/2019 56     QRS Axis 09/20/2019 15     T Wave Axis 09/20/2019 17     TSH 3RD GENERATON 09/22/2019 3 020     Cholesterol 09/22/2019 154     Triglycerides 09/22/2019 147     HDL, Direct 09/22/2019 51     LDL Calculated 09/22/2019 74     Non-HDL-Chol (CHOL-HDL) 09/22/2019 103        Assessment/Plan     53F with mild tooth pain localized to tooth #2, no signs of acute infection  Patient is in need of dental extraction as outpatient   Can go see her general dentist or schedule an appointment at [facility]       [address]  [facility] [place] [facility] for [facility]  [address] [facility] [address], [address] [facility], 98 [facility]

## 2019-09-23 NOTE — PROGRESS NOTES
09/23/19 0754   Team Meeting   Meeting Type Daily Rounds   Team Members Present   Team Members Present Physician;Nurse;;; Other (Discipline and Name)   Physician Team Member Queta Cooper Team Member LUZ ELENA BLANCO [facility] Management Team Member Gorge   Social Work Team Member Marcos   Other (Discipline and Name) Zack   Patient/Family Present   Patient Present No   Patient's Family Present No     New admission-placed under Dr Magi Thornton service

## 2019-09-23 NOTE — CASE MANAGEMENT
Patient admitted 9/20/2019 on a 201 from [facility]  Patient reports increased depression, anxiety and stated she overdosed on her BP medication then called an ambulance  Patient reports she has outpatient at [facility] and had not been there for the past 2 months and therefore ran out of her medication one month ago  Patient reports she [place] in [place] with her 2 sons ages 34 and 27  Simba Braunick reports past inpatient psychiatric admissions, last admission was 2 months ago at [facility]  She also reports admissions at [facility] and past inpatient rehab admission,stating the only facility she can remember is [facility]  Patient reports past alcohol abuse, denies current abuse  AUDIT 22/40  UDS was negative  Patient denies legal, denies access to firearms  Patient reports she receives $1535 00/month in SSD  Patient reports her pharmacy is the [facility] on [address] [facility] in [place]  Treatment team met with patient this morning and the treatment plan and goals were reviewed and patient signed the treatment plan  DINORAH signed for father Jessica Bhardwaj, PCP and LENORA   called [facility] and patient has not been there for the past 2 months, intake appointment is scheduled October 3rd at   [address] with Dk Muro  (noted in AVS)  Admission IMM signed and placed in patients discharge folder  Patient rescinded the 72 hour notice at [address]

## 2019-09-24 RX ORDER — GABAPENTIN 400 MG/1
400 CAPSULE ORAL 3 TIMES DAILY
Status: DISCONTINUED | OUTPATIENT
Start: 2019-09-24 | End: 2019-09-26 | Stop reason: HOSPADM

## 2019-09-24 RX ORDER — IBUPROFEN 400 MG/1
400 TABLET ORAL EVERY 6 HOURS PRN
Status: DISCONTINUED | OUTPATIENT
Start: 2019-09-24 | End: 2019-09-24

## 2019-09-24 RX ORDER — IBUPROFEN 400 MG/1
400 TABLET ORAL EVERY 6 HOURS PRN
Status: DISCONTINUED | OUTPATIENT
Start: 2019-09-24 | End: 2019-09-26 | Stop reason: HOSPADM

## 2019-09-24 RX ADMIN — CLONIDINE HYDROCHLORIDE 0.1 MG: 0.1 TABLET ORAL at 08:27

## 2019-09-24 RX ADMIN — SUCRALFATE 1 G: 1 TABLET ORAL at 11:53

## 2019-09-24 RX ADMIN — NICOTINE POLACRILEX 2 MG: 2 GUM, CHEWING ORAL at 09:04

## 2019-09-24 RX ADMIN — SUCRALFATE 1 G: 1 TABLET ORAL at 16:22

## 2019-09-24 RX ADMIN — NICOTINE POLACRILEX 2 MG: 2 GUM, CHEWING ORAL at 13:30

## 2019-09-24 RX ADMIN — NALTREXONE HYDROCHLORIDE 50 MG: 50 TABLET, FILM COATED ORAL at 08:26

## 2019-09-24 RX ADMIN — LEVOTHYROXINE SODIUM 50 MCG: 50 TABLET ORAL at 06:11

## 2019-09-24 RX ADMIN — TRAZODONE HYDROCHLORIDE 100 MG: 100 TABLET ORAL at 21:48

## 2019-09-24 RX ADMIN — Medication 1 TABLET: at 08:26

## 2019-09-24 RX ADMIN — ESCITALOPRAM OXALATE 15 MG: 10 TABLET ORAL at 08:27

## 2019-09-24 RX ADMIN — GABAPENTIN 400 MG: 400 CAPSULE ORAL at 21:48

## 2019-09-24 RX ADMIN — CLONIDINE HYDROCHLORIDE 0.1 MG: 0.1 TABLET ORAL at 17:03

## 2019-09-24 RX ADMIN — NICOTINE 1 PATCH: 14 PATCH, EXTENDED RELEASE TRANSDERMAL at 08:25

## 2019-09-24 RX ADMIN — SUCRALFATE 1 G: 1 TABLET ORAL at 06:11

## 2019-09-24 RX ADMIN — AMLODIPINE BESYLATE 5 MG: 5 TABLET ORAL at 08:26

## 2019-09-24 RX ADMIN — NICOTINE POLACRILEX 2 MG: 2 GUM, CHEWING ORAL at 17:55

## 2019-09-24 RX ADMIN — HYDROCHLOROTHIAZIDE 25 MG: 25 TABLET ORAL at 08:26

## 2019-09-24 RX ADMIN — LISINOPRIL 20 MG: 20 TABLET ORAL at 08:26

## 2019-09-24 RX ADMIN — THIAMINE HCL TAB 100 MG 100 MG: 100 TAB at 08:26

## 2019-09-24 RX ADMIN — GABAPENTIN 300 MG: 300 CAPSULE ORAL at 08:27

## 2019-09-24 RX ADMIN — GABAPENTIN 400 MG: 400 CAPSULE ORAL at 16:22

## 2019-09-24 RX ADMIN — SUCRALFATE 1 G: 1 TABLET ORAL at 21:48

## 2019-09-24 RX ADMIN — FOLIC ACID 1 MG: 1 TABLET ORAL at 08:26

## 2019-09-24 RX ADMIN — ATORVASTATIN CALCIUM 40 MG: 40 TABLET, FILM COATED ORAL at 16:22

## 2019-09-24 RX ADMIN — LURASIDONE HYDROCHLORIDE 40 MG: 40 TABLET, FILM COATED ORAL at 16:22

## 2019-09-24 RX ADMIN — PANTOPRAZOLE SODIUM 40 MG: 40 TABLET, DELAYED RELEASE ORAL at 06:11

## 2019-09-24 NOTE — PROGRESS NOTES
"Physician:Diana Yeager MD  Diagnosis: neck pain  Encounter Diagnosis   Name Primary?    Pain       Orders:  Physical Therapy evaluate and treat  Treatment start time: 10:20  Treatment end time: 11:10    Subjective:  Pt c/o pain in L UT/neck x 4 months of insidious onset that she rates as 3/10 presently.  Pt denies UE radicular sx.  She describes neck as being "stiff."    Chief complaint:  pain  Radicular symptoms:  none  Aggravating factors:   L rot and R SB  Easing factors:  rest     Current functional status:   Independent with ADL    Patients structured exercise routine:    none  Exercise routine prior to onset :     none    Special tests:    MRI:    none  Xray:    neg    Past treatment includes:  massage    Work:     Massage therapist                             Pts goals:  Pain-free movement    OBJECTIVE:  Postural examination:  slumped shld     Functional assessment:   - walking:   independent             AROM:  L rot and R SB decreased about 30% with pain, others WNL     MMT:   5/5 neck/B UE    Tone:  normal    Flexibility testing:   Tight L UT and SO    Special tests:   Neg C-compression/distraction    Palpation:   TTP L UT    Joint mobility: fair    Swelling:  none    Other: sensation intact to light touch      History  Co-morbidities and personal factors that may impact the plan of care Examination  Body Structures and Functions, activity limitations and participation restrictions that may impact the plan of care    Clinical Presentation   Co-morbidities:   none        Personal Factors:   no deficits Body Regions:   neck    Body Systems:    ROM            Participation Restrictions:   none     Activity limitations:   Learning and applying knowledge  no deficits    General Tasks and Commands  no deficits    Communication  no deficits    Mobility  no deficits    Self care  no deficits    Domestic Life  no deficits    Interactions/Relationships  no deficits    Life Areas  no deficits    Community " 09/24/19 0700   Team Meeting   Meeting Type Daily Rounds   Team Members Present   Team Members Present Physician;Nurse; Other (Discipline and Name)   Physician Team Member Francisco Javier Golden Team Member LUZ ELENA BLANCO [facility] Management Team Member Gorge   Social Work Team Member Lorie Rangel   Patient/Family Present   Patient Present No   Patient's Family Present No     CM will talk with father today( pt signed DINORAH)  D/C Thursday  and Social Life  no deficits         stable and uncomplicated                      low   low  low Decision Making/ Complexity Score:  low           TREATMENT:    Today's treatment:  HP x 12 min, HEP and DN 2 trigger points L UT.    Pt was provided with a written copy of exercises to perform as tolerated, including: chin retractions, scap retractions and UT stretch.    Exercises were reviewed and pt was able to demonstrate them prior to the end of the session.     Pt was provided educational information, including: correct posture.    Discussed insurance limitations with pt.     ASSESSMENT:  PT diagnosis: C-strain with pain and decreased ROM   Patient can benefit from outpatient physical therapy and a home program  Prognosis is Good.    No cultural, environmental or spiritual barriers identified to treatment or learning.     Medical necessity is demonstrated by the following  IMPAIRMENTS:  Pain limits function of effected part for some activities and Unable to participate fully in daily activities    GOALS:    4_   weeks. Pt agrees with goals set.  1. Independent with HEP.  2. Report decreased    neck    pain  <   / =  2/10 with adls such as dressing  3. Increased arom  for  Neck to WNL with functional activities    PLAN:  Outpatient physical therapy 1-2 times weekly to include: pt ed, hep, therapeutic exercises, neuromuscular re-education/ balance exercises, joint mobilizations, modalities prn, and aquatic therapy.    Cont PT for  6         weeks.   I certify the need for these services   furnished under this plan of treatment and while under my   care.____________________________________ Physician/Referring Practitioner Date   of Signature

## 2019-09-24 NOTE — PROGRESS NOTES
Patient denies SI/HI, A/V hallucinations, depression, anxiety  CIWA score 0  Present in the milieu and social with other patients  Requested from nurse PRN nicotine gum  Will continue to monitor

## 2019-09-24 NOTE — PROGRESS NOTES
[facility] GROUP NOTE     09/23/19 1515   Activity/Group Checklist   Group Life Skills  (Life Balance)   Attendance Attended   Attendance Duration (min) 31-45   Interactions Interacted appropriately   Affect/Mood Appropriate;Blunted/Flat   Goals Achieved Able to listen to others; Able to engage in interactions   Required assistance initiating, scant verbalization

## 2019-09-24 NOTE — PROGRESS NOTES
Pt visible on unit  Denies symptoms  -SI/HI/AVH  No complaints of anxiety  Compliant with care  Will monitor

## 2019-09-24 NOTE — PROGRESS NOTES
Pt denies all symptoms  Pt is calm, cooperative and pleasant  Pt is has a brighter affect stating "I feel these medications are really helping me " Pt is about the unit and social with peers  Compliant with medications  Will monitor

## 2019-09-24 NOTE — PROGRESS NOTES
Progress Note - [address] 48 y o  female MRN: [id number]  Unit/Bed#: [facility] 343-01 Encounter: [id number]    The patient was seen for continuing care and reviewed with treatment team  Patient reports anxiety symptoms with nervousness, palpitations, excessive worry and ruminating thoughts  Patient also reports depressed mood  Patient reports low energy level  Patient denies suicidal or homicidal ideation at this time  Patient is out of room, participates in unit activities, and interacts with peers  Patient reports good sleep and good appetite  Patient denies side effects  Mental Status Evaluation:    Appearance:  casually dressed   Behavior:  cooperative   Speech:  normal rate and volume   Mood:  depressed, anxious   Affect:  constricted   Thought Process:  coherent   Associations: concrete associations   Thought Content:  no overt delusions   Perceptual Disturbances: no auditory hallucinations, no visual hallucinations   Risk Potential: Suicidal ideation - None  Homicidal ideation - None  Potential for aggression - No   Sensorium:  oriented to person, place and time/date   Memory:  recent and remote memory grossly intact   Consciousness:  alert and awake   Attention: attention span and concentration appear shorter than expected for age   Insight:  fair   Judgment: fair   Gait/Station: normal gait/station   Motor Activity: no abnormal movements       Vitals:    09/24/19 1100   BP: 122/71   Pulse: 80   Resp: 16   Temp:    SpO2:        Assessment/Plan    Principal Problem:    Bipolar affective disorder, depressed, severe ([facility])  Active Problems:    Essential hypertension    Anxiety    Hypothyroidism    Gastritis    Alcohol use disorder, severe, dependence ([facility])    h/o Seizure ([facility] [address] )    PTSD (post-traumatic stress disorder)    Reported medication overdose self-harm      Recommended Treatment: Continue present medication   Continue with pharmacotherapy, group therapy, milieu therapy and occupational therapy    The patient will be maintained on the following medications:    Current Facility-Administered Medications:  acetaminophen 650 mg Oral Q6H PRN Alissasaisha Mark MD   acetaminophen 975 mg Oral Q6H PRN Alissasaisha Mark MD   aluminum-magnesium hydroxide-simethicone 30 mL Oral Q4H PRN Alissasaisha Mark MD   amLODIPine 5 mg Oral Daily Alissasaisha Mark MD   atorvastatin 40 mg Oral Daily With [employer] Dickson PA-C   cloNIDine 0 1 mg Oral BID Tricia Mark MD   escitalopram 15 mg Oral Daily Magno Ibanez MD   folic acid 1 mg Oral Daily Tricia Mark MD   gabapentin 400 mg Oral TID Magno Ibanez MD   haloperidol 5 mg Oral Q8H PRN Alissasaisha Mark MD   haloperidol lactate 5 mg Intramuscular Q8H PRN Alissasaisha Mark MD   hydrochlorothiazide 25 mg Oral Daily Milagros Morgan PA-C   hydrOXYzine HCL 50 mg Oral Q6H PRN Alissasaisha Mark MD   ibuprofen 400 mg Oral Q6H PRN Maria E Santiago MD   levothyroxine 50 mcg Oral Daily Mera Rothman MD   lisinopril 20 mg Oral Daily Milagros Morgan PA-C   LORazepam 2 mg Oral Q4H PRN Alissasaisha Mark MD   lurasidone 40 mg Oral Daily With Gena Valerio MD   magnesium hydroxide 30 mL Oral Daily PRN Alissasaisha Mark MD   multivitamin-minerals 1 tablet Oral Daily Alissasaisha Mark MD   naltrexone 50 mg Oral Daily Mera Rothman MD   nicotine 1 patch Transdermal Daily Mera Rothman MD   nicotine polacrilex 2 mg Oral Q2H PRN Tricia Mark MD   pantoprazole 40 mg Oral Early Morning Milagros Morgan PA-C   sucralfate 1 g Oral 4x Daily (AC & HS) Milagros Morgan PA-C   thiamine 100 mg Oral Daily Tricia Mark MD   traZODone 100 mg Oral HS PRN Magno Ibanez MD

## 2019-09-24 NOTE — PLAN OF CARE
Problem: SELF HARM/SUICIDALITY  Goal: Will have no self-injury during hospital stay  Description  INTERVENTIONS:  - Q 15 MINUTES: Routine safety checks  - Q WAKING SHIFT & PRN: Assess risk to determine if routine checks are adequate to maintain patient safety  - Encourage patient to participate actively in care by formulating a plan to combat response to suicidal ideation, identify supports and resources  Outcome: Progressing     Problem: DEPRESSION  Goal: Will be euthymic at discharge  Description  INTERVENTIONS:  - Administer medication as ordered  - Provide emotional support via 1:1 interaction with staff  - Encourage involvement in milieu/groups/activities  - Monitor for social isolation  Outcome: Progressing     Problem: ANXIETY  Goal: Will report anxiety at manageable levels  Description  INTERVENTIONS:  - Administer medication as ordered  - Teach and encourage coping skills  - Provide emotional support  - Assess patient/family for anxiety and ability to cope  Outcome: Progressing  Goal: By discharge: Patient will verbalize 2 strategies to deal with anxiety  Description  Interventions:  - Identify any obvious source/trigger to anxiety  - Staff will assist patient in applying identified coping technique/skills  - Encourage attendance of scheduled groups and activities  Outcome: Progressing     Problem: SUBSTANCE USE/ABUSE  Goal: Will have no detox symptoms and will verbalize plan for changing substance-related behavior  Description  INTERVENTIONS:  - Monitor physical status and assess for symptoms of withdrawal  - Administer medication as ordered  - Provide emotional support with 1 on 1 interaction with staff  - Encourage recovery focused program/ addiction education  - Assess for verbalization of changing behaviors related to substance abuse  - Initiate consults and referrals as appropriate (Case Management, Spiritual Care, etc )  Outcome: Progressing  Goal: By discharge, will develop insight into their chemical dependency and sustain motivation to continue in recovery  Description  INTERVENTIONS:  - Attends all daily group sessions and scheduled AA groups  - Actively practices coping skills through participation in the therapeutic community and adherence to program rules  - Reviews and completes assignments from individual treatment plan  - Assist patient development of understanding of their personal cycle of addiction and relapse triggers  Outcome: Progressing

## 2019-09-25 RX ADMIN — NICOTINE POLACRILEX 2 MG: 2 GUM, CHEWING ORAL at 17:56

## 2019-09-25 RX ADMIN — LEVOTHYROXINE SODIUM 50 MCG: 50 TABLET ORAL at 06:13

## 2019-09-25 RX ADMIN — THIAMINE HCL TAB 100 MG 100 MG: 100 TAB at 08:40

## 2019-09-25 RX ADMIN — LISINOPRIL 20 MG: 20 TABLET ORAL at 08:40

## 2019-09-25 RX ADMIN — CLONIDINE HYDROCHLORIDE 0.1 MG: 0.1 TABLET ORAL at 17:07

## 2019-09-25 RX ADMIN — HYDROCHLOROTHIAZIDE 25 MG: 25 TABLET ORAL at 08:39

## 2019-09-25 RX ADMIN — AMLODIPINE BESYLATE 5 MG: 5 TABLET ORAL at 08:40

## 2019-09-25 RX ADMIN — NICOTINE POLACRILEX 2 MG: 2 GUM, CHEWING ORAL at 10:02

## 2019-09-25 RX ADMIN — SUCRALFATE 1 G: 1 TABLET ORAL at 16:48

## 2019-09-25 RX ADMIN — SUCRALFATE 1 G: 1 TABLET ORAL at 08:40

## 2019-09-25 RX ADMIN — IBUPROFEN 400 MG: 400 TABLET ORAL at 13:40

## 2019-09-25 RX ADMIN — NICOTINE 1 PATCH: 14 PATCH, EXTENDED RELEASE TRANSDERMAL at 10:02

## 2019-09-25 RX ADMIN — LURASIDONE HYDROCHLORIDE 40 MG: 40 TABLET, FILM COATED ORAL at 16:47

## 2019-09-25 RX ADMIN — ATORVASTATIN CALCIUM 40 MG: 40 TABLET, FILM COATED ORAL at 16:48

## 2019-09-25 RX ADMIN — GABAPENTIN 400 MG: 400 CAPSULE ORAL at 08:37

## 2019-09-25 RX ADMIN — GABAPENTIN 400 MG: 400 CAPSULE ORAL at 21:47

## 2019-09-25 RX ADMIN — NALTREXONE HYDROCHLORIDE 50 MG: 50 TABLET, FILM COATED ORAL at 08:39

## 2019-09-25 RX ADMIN — TRAZODONE HYDROCHLORIDE 100 MG: 100 TABLET ORAL at 21:49

## 2019-09-25 RX ADMIN — Medication 1 TABLET: at 08:37

## 2019-09-25 RX ADMIN — SUCRALFATE 1 G: 1 TABLET ORAL at 11:07

## 2019-09-25 RX ADMIN — GABAPENTIN 400 MG: 400 CAPSULE ORAL at 16:48

## 2019-09-25 RX ADMIN — CLONIDINE HYDROCHLORIDE 0.1 MG: 0.1 TABLET ORAL at 08:39

## 2019-09-25 RX ADMIN — SUCRALFATE 1 G: 1 TABLET ORAL at 21:47

## 2019-09-25 RX ADMIN — FOLIC ACID 1 MG: 1 TABLET ORAL at 08:40

## 2019-09-25 RX ADMIN — PANTOPRAZOLE SODIUM 40 MG: 40 TABLET, DELAYED RELEASE ORAL at 06:13

## 2019-09-25 RX ADMIN — ESCITALOPRAM OXALATE 15 MG: 10 TABLET ORAL at 08:37

## 2019-09-25 NOTE — PLAN OF CARE
Problem: SELF HARM/SUICIDALITY  Goal: Will have no self-injury during hospital stay  Description  INTERVENTIONS:  - Q 15 MINUTES: Routine safety checks  - Q WAKING SHIFT & PRN: Assess risk to determine if routine checks are adequate to maintain patient safety  - Encourage patient to participate actively in care by formulating a plan to combat response to suicidal ideation, identify supports and resources  Outcome: Progressing     Problem: DEPRESSION  Goal: Will be euthymic at discharge  Description  INTERVENTIONS:  - Administer medication as ordered  - Provide emotional support via 1:1 interaction with staff  - Encourage involvement in milieu/groups/activities  - Monitor for social isolation  Outcome: Progressing     Problem: ANXIETY  Goal: Will report anxiety at manageable levels  Description  INTERVENTIONS:  - Administer medication as ordered  - Teach and encourage coping skills  - Provide emotional support  - Assess patient/family for anxiety and ability to cope  Outcome: Progressing  Goal: By discharge: Patient will verbalize 2 strategies to deal with anxiety  Description  Interventions:  - Identify any obvious source/trigger to anxiety  - Staff will assist patient in applying identified coping technique/skills  - Encourage attendance of scheduled groups and activities  Outcome: Progressing     Problem: SUBSTANCE USE/ABUSE  Goal: Will have no detox symptoms and will verbalize plan for changing substance-related behavior  Description  INTERVENTIONS:  - Monitor physical status and assess for symptoms of withdrawal  - Administer medication as ordered  - Provide emotional support with 1 on 1 interaction with staff  - Encourage recovery focused program/ addiction education  - Assess for verbalization of changing behaviors related to substance abuse  - Initiate consults and referrals as appropriate (Case Management, Spiritual Care, etc )  Outcome: Progressing

## 2019-09-25 NOTE — PROGRESS NOTES
Progress Note - Behavioral Health   Ame Pulse 48 y o  female MRN: [id number]  Unit/Bed#: [facility] 343-01 Encounter: [id number]    The patient was seen for continuing care and reviewed with treatment team   Patient reports feeling better, reports feeling less anxious and less depressed  Patient reports good sleep and good appetite  Patient denies suicidal or homicidal ideation  Patient is out of room participating in unit activities, with brighter affect  Patient denies side effects  Mental Status Evaluation:    Appearance:  casually dressed   Behavior:  cooperative   Speech:  normal rate and volume   Mood:  improved   Affect:  brighter   Thought Process:  coherent   Associations: intact associations   Thought Content:  no overt delusions   Perceptual Disturbances: no auditory hallucinations, no visual hallucinations   Risk Potential: Suicidal ideation - None  Homicidal ideation - None  Potential for aggression - No   Sensorium:  oriented to person, place and time/date   Memory:  recent and remote memory grossly intact   Consciousness:  alert and awake   Attention: attention span and concentration appear shorter than expected for age   Insight:  fair   Judgment: fair   Gait/Station: normal gait/station   Motor Activity: no abnormal movements       Vitals:    09/25/19 1100   BP: 102/68   Pulse: 75   Resp:    Temp:    SpO2:        Assessment/Plan    Principal Problem:    Bipolar affective disorder, depressed, severe ([facility])  Active Problems:    Essential hypertension    Anxiety    Hypothyroidism    Gastritis    Alcohol use disorder, severe, dependence ([facility])    h/o Seizure ([address] )    PTSD (post-traumatic stress disorder)    Reported medication overdose self-harm      Recommended Treatment: Continue present medications  Continue with pharmacotherapy, group therapy, milieu therapy and occupational therapy    The patient will be maintained on the following medications:    Current Facility-Administered Medications:  acetaminophen 650 mg Oral Q6H PRN Alissaslav P MD Deedee   acetaminophen 975 mg Oral Q6H PRN Alissasaisha Mark MD   aluminum-magnesium hydroxide-simethicone 30 mL Oral Q4H PRN Alissasaisha Mark MD   amLODIPine 5 mg Oral Daily Alissasaisha Mark MD   atorvastatin 40 mg Oral Daily With Cathy Mena PA-C   cloNIDine 0 1 mg Oral BID Tricia Mark MD   escitalopram 15 mg Oral Daily Rosemarie Montilla MD   folic acid 1 mg Oral Daily Alissasaisha Mark MD   gabapentin 400 mg Oral TID Rosemarie Montilla MD   haloperidol 5 mg Oral Q8H PRN Alissasaisha Mark MD   haloperidol lactate 5 mg Intramuscular Q8H PRN Alissasaisha P MD Deedee   hydrochlorothiazide 25 mg Oral Daily Milagros Morgan PA-C   hydrOXYzine HCL 50 mg Oral Q6H PRN Alissasaisha Mark MD   ibuprofen 400 mg Oral Q6H PRN Alonso Coe MD   levothyroxine 50 mcg Oral Daily Jeff Batista MD   lisinopril 20 mg Oral Daily Milagros Morgan PA-C   LORazepam 2 mg Oral Q4H PRN Tricia Mark MD   lurasidone 40 mg Oral Daily With Axel Jones MD   magnesium hydroxide 30 mL Oral Daily PRN Alissasaisha Mark MD   multivitamin-minerals 1 tablet Oral Daily Tricia Mark MD   naltrexone 50 mg Oral Daily Jeff Batista MD   nicotine 1 patch Transdermal Daily Jeff Batista MD   nicotine polacrilex 2 mg Oral Q2H PRN Alissasaisha Mark MD   pantoprazole 40 mg Oral Early Morning Milagros Morgan PA-C   sucralfate 1 g Oral 4x Daily (AC & HS) Milagros Morgan PA-C   thiamine 100 mg Oral Daily Tricia Mark MD   traZODone 100 mg Oral HS PRN Rosemarie Montilla MD

## 2019-09-25 NOTE — PROGRESS NOTES
09/24/19 1130   Service   Service SLIM   Provider Name [facility]   Provider reviewed oral surgeon note re: tooth pain and recommended extraction  Recommendation to have tooth extract and dental appt scheduled outpatient  Provider DC Tylenol and ordered ibuprofen for pain  Nurse provided recommendations to patient and verbalized understanding  Pain 0/10 at that time

## 2019-09-25 NOTE — PROGRESS NOTES
09/25/19 1115   Activity/Group Checklist   Group Other (Comment)  (stress management)   Attendance Attended   Attendance Duration (min) 46-60   Interactions Interacted appropriately   Affect/Mood Blunted/flat   Goals Achieved Identified feelings; Discussed coping strategies; Able to listen to others; Able to engage in interactions; Able to reflect/comment on own behavior;Able to manage/cope with feelings;Verbalized increased hopefulness; Able to self-disclose; Able to recieve feedback; Able to give feedback to another;Able to experience relief/decrease in symptoms

## 2019-09-25 NOTE — PROGRESS NOTES
09/25/19 0800   Team Meeting   Meeting Type Daily Rounds   Team Members Present   Team Members Present Physician;Nurse;;; Other (Discipline and Name)   Physician Team Member [id number] Team Member LUZ ELENA BLANCO [facility] Management Team Member Che   Social Work Team Member Rice   Other (Discipline and Name) 2 RN students   Patient/Family Present   Patient Present No   Patient's Family Present No     D/C Thursday

## 2019-09-25 NOTE — PROGRESS NOTES
[facility] GROUP NOTE     09/25/19 1410   Activity/Group Checklist   Group Life Skills  (Parts of a House Recovery Activity)   Attendance Attended   Attendance Duration (min) 31-45   Interactions Interacted appropriately   Affect/Mood Appropriate   Goals Achieved Able to listen to others; Able to engage in interactions
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
## 2019-09-25 NOTE — PROGRESS NOTES
Patient approached nurse for copy of medications given and report from [facility] given to patient and reviewed with patient
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
## 2019-09-26 VITALS
DIASTOLIC BLOOD PRESSURE: 67 MMHG | OXYGEN SATURATION: 99 % | RESPIRATION RATE: 16 BRPM | HEART RATE: 77 BPM | TEMPERATURE: 99.1 F | WEIGHT: 177.4 LBS | SYSTOLIC BLOOD PRESSURE: 111 MMHG | BODY MASS INDEX: 31.43 KG/M2 | HEIGHT: 63 IN

## 2019-09-26 RX ORDER — CLONIDINE HYDROCHLORIDE 0.1 MG/1
0.1 TABLET ORAL 2 TIMES DAILY
Qty: 60 TABLET | Refills: 0 | Status: ON HOLD | OUTPATIENT
Start: 2019-09-26 | End: 2019-11-19 | Stop reason: SDUPTHER

## 2019-09-26 RX ORDER — TRAZODONE HYDROCHLORIDE 50 MG/1
50 TABLET ORAL
Qty: 30 TABLET | Refills: 0 | Status: SHIPPED | OUTPATIENT
Start: 2019-09-26 | End: 2019-11-20 | Stop reason: HOSPADM

## 2019-09-26 RX ORDER — GABAPENTIN 400 MG/1
400 CAPSULE ORAL 3 TIMES DAILY
Qty: 30 CAPSULE | Refills: 0 | Status: ON HOLD | OUTPATIENT
Start: 2019-09-26 | End: 2019-11-19 | Stop reason: SDUPTHER

## 2019-09-26 RX ORDER — LEVOTHYROXINE SODIUM 0.05 MG/1
50 TABLET ORAL DAILY
Qty: 30 TABLET | Refills: 0 | Status: ON HOLD | OUTPATIENT
Start: 2019-09-26 | End: 2019-11-19 | Stop reason: SDUPTHER

## 2019-09-26 RX ORDER — TRAZODONE HYDROCHLORIDE 50 MG/1
50 TABLET ORAL
Qty: 30 TABLET | Refills: 0 | Status: SHIPPED | OUTPATIENT
Start: 2019-09-26 | End: 2019-09-26 | Stop reason: SDUPTHER

## 2019-09-26 RX ORDER — METOPROLOL TARTRATE 50 MG/1
50 TABLET, FILM COATED ORAL EVERY 12 HOURS
Qty: 60 TABLET | Refills: 0 | Status: SHIPPED | OUTPATIENT
Start: 2019-09-26 | End: 2019-11-20 | Stop reason: HOSPADM

## 2019-09-26 RX ORDER — LANOLIN ALCOHOL/MO/W.PET/CERES
100 CREAM (GRAM) TOPICAL DAILY
Qty: 30 TABLET | Refills: 0 | Status: ON HOLD | OUTPATIENT
Start: 2019-09-26 | End: 2019-11-19 | Stop reason: SDUPTHER

## 2019-09-26 RX ORDER — AMLODIPINE BESYLATE 5 MG/1
5 TABLET ORAL DAILY
Qty: 30 TABLET | Refills: 0 | Status: ON HOLD | OUTPATIENT
Start: 2019-09-26 | End: 2019-11-19 | Stop reason: SDUPTHER

## 2019-09-26 RX ORDER — FOLIC ACID 1 MG/1
1 TABLET ORAL DAILY
Qty: 30 TABLET | Refills: 0 | Status: ON HOLD | OUTPATIENT
Start: 2019-09-26 | End: 2019-11-19 | Stop reason: SDUPTHER

## 2019-09-26 RX ORDER — LISINOPRIL 20 MG/1
20 TABLET ORAL DAILY
Qty: 30 TABLET | Refills: 0 | Status: ON HOLD | OUTPATIENT
Start: 2019-09-26 | End: 2019-11-19 | Stop reason: SDUPTHER

## 2019-09-26 RX ORDER — NALTREXONE HYDROCHLORIDE 50 MG/1
50 TABLET, FILM COATED ORAL DAILY
Qty: 30 TABLET | Refills: 0 | Status: SHIPPED | OUTPATIENT
Start: 2019-09-26 | End: 2019-11-20 | Stop reason: HOSPADM

## 2019-09-26 RX ORDER — PANTOPRAZOLE SODIUM 40 MG/1
40 TABLET, DELAYED RELEASE ORAL
Qty: 30 TABLET | Refills: 0 | Status: ON HOLD | OUTPATIENT
Start: 2019-09-27 | End: 2019-11-19 | Stop reason: SDUPTHER

## 2019-09-26 RX ORDER — SUCRALFATE 1 G/1
1 TABLET ORAL
Qty: 30 TABLET | Refills: 0 | Status: SHIPPED | OUTPATIENT
Start: 2019-09-26 | End: 2019-11-20 | Stop reason: HOSPADM

## 2019-09-26 RX ORDER — ATORVASTATIN CALCIUM 40 MG/1
40 TABLET, FILM COATED ORAL
Qty: 30 TABLET | Refills: 0 | Status: ON HOLD | OUTPATIENT
Start: 2019-09-26 | End: 2019-11-19 | Stop reason: SDUPTHER

## 2019-09-26 RX ORDER — ESCITALOPRAM OXALATE 5 MG/1
15 TABLET ORAL DAILY
Qty: 90 TABLET | Refills: 0 | Status: SHIPPED | OUTPATIENT
Start: 2019-09-26 | End: 2019-11-20 | Stop reason: HOSPADM

## 2019-09-26 RX ORDER — HYDROCHLOROTHIAZIDE 25 MG/1
25 TABLET ORAL DAILY
Qty: 30 TABLET | Refills: 0 | Status: ON HOLD | OUTPATIENT
Start: 2019-09-26 | End: 2019-11-19 | Stop reason: SDUPTHER

## 2019-09-26 RX ADMIN — THIAMINE HCL TAB 100 MG 100 MG: 100 TAB at 09:09

## 2019-09-26 RX ADMIN — LEVOTHYROXINE SODIUM 50 MCG: 50 TABLET ORAL at 06:18

## 2019-09-26 RX ADMIN — NICOTINE POLACRILEX 2 MG: 2 GUM, CHEWING ORAL at 09:57

## 2019-09-26 RX ADMIN — Medication 1 TABLET: at 09:07

## 2019-09-26 RX ADMIN — GABAPENTIN 400 MG: 400 CAPSULE ORAL at 09:07

## 2019-09-26 RX ADMIN — HYDROCHLOROTHIAZIDE 25 MG: 25 TABLET ORAL at 09:07

## 2019-09-26 RX ADMIN — ESCITALOPRAM OXALATE 15 MG: 10 TABLET ORAL at 09:02

## 2019-09-26 RX ADMIN — NICOTINE 1 PATCH: 14 PATCH, EXTENDED RELEASE TRANSDERMAL at 09:06

## 2019-09-26 RX ADMIN — IBUPROFEN 400 MG: 400 TABLET ORAL at 12:49

## 2019-09-26 RX ADMIN — FOLIC ACID 1 MG: 1 TABLET ORAL at 09:09

## 2019-09-26 RX ADMIN — NALTREXONE HYDROCHLORIDE 50 MG: 50 TABLET, FILM COATED ORAL at 09:08

## 2019-09-26 RX ADMIN — LISINOPRIL 20 MG: 20 TABLET ORAL at 09:10

## 2019-09-26 RX ADMIN — CLONIDINE HYDROCHLORIDE 0.1 MG: 0.1 TABLET ORAL at 09:08

## 2019-09-26 RX ADMIN — SUCRALFATE 1 G: 1 TABLET ORAL at 09:10

## 2019-09-26 RX ADMIN — PANTOPRAZOLE SODIUM 40 MG: 40 TABLET, DELAYED RELEASE ORAL at 06:18

## 2019-09-26 RX ADMIN — SUCRALFATE 1 G: 1 TABLET ORAL at 12:19

## 2019-09-26 RX ADMIN — AMLODIPINE BESYLATE 5 MG: 5 TABLET ORAL at 09:08

## 2019-09-26 NOTE — CASE MANAGEMENT
Pt was appropriate in conversation and demeanor, bright affect, states she is really looking forward to discharge and returning home  Pt denies any ideations of self harm, no SI, no HI  Pt states she will remain clean & sober  Pt states she feels greatly improved emotionally since her admit to the unit  Pt demonstrated clear understanding of medication education and outpt appts and resources  Pt states she will take her medications as prescribed and go to all of her outpt appts

## 2019-09-26 NOTE — PLAN OF CARE
Problem: SELF HARM/SUICIDALITY  Goal: Will have no self-injury during hospital stay  Description  INTERVENTIONS:  - Q 15 MINUTES: Routine safety checks  - Q WAKING SHIFT & PRN: Assess risk to determine if routine checks are adequate to maintain patient safety  - Encourage patient to participate actively in care by formulating a plan to combat response to suicidal ideation, identify supports and resources  Outcome: Completed     Problem: DEPRESSION  Goal: Will be euthymic at discharge  Description  INTERVENTIONS:  - Administer medication as ordered  - Provide emotional support via 1:1 interaction with staff  - Encourage involvement in milieu/groups/activities  - Monitor for social isolation  Outcome: Completed     Problem: ANXIETY  Goal: Will report anxiety at manageable levels  Description  INTERVENTIONS:  - Administer medication as ordered  - Teach and encourage coping skills  - Provide emotional support  - Assess patient/family for anxiety and ability to cope  Outcome: Completed  Goal: By discharge: Patient will verbalize 2 strategies to deal with anxiety  Description  Interventions:  - Identify any obvious source/trigger to anxiety  - Staff will assist patient in applying identified coping technique/skills  - Encourage attendance of scheduled groups and activities  Outcome: Completed     Problem: SUBSTANCE USE/ABUSE  Goal: Will have no detox symptoms and will verbalize plan for changing substance-related behavior  Description  INTERVENTIONS:  - Monitor physical status and assess for symptoms of withdrawal  - Administer medication as ordered  - Provide emotional support with 1 on 1 interaction with staff  - Encourage recovery focused program/ addiction education  - Assess for verbalization of changing behaviors related to substance abuse  - Initiate consults and referrals as appropriate (Case Management, Spiritual Care, etc )  Outcome: Completed  Goal: By discharge, will develop insight into their chemical dependency and sustain motivation to continue in recovery  Description  INTERVENTIONS:  - Attends all daily group sessions and scheduled AA groups  - Actively practices coping skills through participation in the therapeutic community and adherence to program rules  - Reviews and completes assignments from individual treatment plan  - Assist patient development of understanding of their personal cycle of addiction and relapse triggers  Outcome: Completed  Goal: By discharge, patient will have ongoing treatment plan addressing chemical dependency  Description  INTERVENTIONS:  - Assist patient with resources and/or appointments for ongoing recovery based living  Outcome: Completed     Problem: DISCHARGE PLANNING  Goal: Discharge to home or other facility with appropriate resources  Description  INTERVENTIONS:  - Identify barriers to discharge w/patient and caregiver  - Arrange for needed discharge resources and transportation as appropriate  - Identify discharge learning needs (meds, wound care, etc )  - Arrange for interpretive services to assist at discharge as needed  - Refer to Case Management Department for coordinating discharge planning if the patient needs post-hospital services based on physician/advanced practitioner order or complex needs related to functional status, cognitive ability, or social support system  Outcome: Completed     Problem: Ineffective Coping  Goal: Participates in unit activities  Description  Interventions:  - Provide therapeutic environment   - Provide required programming   - Redirect inappropriate behaviors   Outcome: Completed
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
## 2019-09-26 NOTE — DISCHARGE INSTR - LAB
Contact Information: If you have any questions, concerns, pended studies, tests and/or procedures, or emergencies regarding your inpatient behavioral health visit  Please contact [facility] behavioral health unit 3B [phone]  and ask to speak to a , nurse or physician  A contact is available 24 hours/ 7 days a week at this number  Summary of Procedures Performed During your Stay:  Below is a list of major procedures performed during your hospital stay and a summary of results:  - No major procedures performed  Pending Studies (From admission, onward)    None        If studies are pending at discharge, follow up with your PCP and/or referring provider

## 2019-09-26 NOTE — PROGRESS NOTES
Patient was bright upon approach, and stated that she is excited about her upcoming discharge  She denies SI/HI/AH/VH and is compliant with medication  There were no complaints of pain throughout the shift  At bedtime medication pass patient asked for a PRN of trazodone to assist with sleeping  PRN of trazodone was given around 2149  As of 2215 patient was lying in bed with eyes closed  No majors were noted throughout the shift

## 2019-09-26 NOTE — PROGRESS NOTES
09/26/19 1115   Activity/Group Checklist   Group   (recovery group)   Attendance Attended   Attendance Duration (min) 46-60   Interactions Interacted appropriately   Affect/Mood Appropriate   Goals Achieved Able to listen to others; Able to engage in interactions; Able to reflect/comment on own behavior;Able to self-disclose   Patient presented her power wheel from yesterday and identified when she is more social she is healthier  She is being discharged today and has sought our support from another peer

## 2019-09-26 NOTE — NURSING NOTE
Patient discharged to self  She is to be transported by father  All discharge information, medications, diagnosis, upcoming appts, crisis numbers taught by   Patient verbalized understanding  Denies SI/HI  PRN pain med reported slightly effective and "hasn't helped completely yet"  Patient plans to follow up outpatient for dental extraction

## 2019-09-26 NOTE — DISCHARGE INSTRUCTIONS
Anxiety   WHAT YOU SHOULD KNOW:   Anxiety is a condition that causes you to feel excessive worry, uneasiness, or fear  Family or work stress, smoking, caffeine, and alcohol can increase your risk for anxiety  Certain medicines or health conditions can also increase your risk  Anxiety may begin gradually, and can become a long-term condition if it is not managed or treated  AFTER YOU LEAVE:   Medicines:   · Medicines  can help you feel more calm and relaxed, and decrease your symptoms  · Take your medicine as directed  Contact your healthcare provider if you think your medicine is not helping or if you have side effects  Tell him if you are allergic to any medicine  Keep a list of the medicines, vitamins, and herbs you take  Include the amounts, and when and why you take them  Bring the list or the pill bottles to follow-up visits  Carry your medicine list with you in case of an emergency  Follow up with your healthcare provider within 2 weeks or as directed:  Write down your questions so you remember to ask them during your visits  Manage anxiety:   · Go to counseling as directed  Cognitive behavioral therapy can help you understand and change how you react to events that trigger your symptoms  · Find ways to manage your symptoms  Activities such as exercise, meditation, or listening to music can help you relax  · Practice deep breathing  Breathing can change how your body reacts to stress  Focus on taking slow, deep breaths several times a day, or during an anxiety attack  Breathe in through your nose, and out through your mouth  · Avoid caffeine  Caffeine can make your symptoms worse  Avoid foods or drinks that are meant to increase your energy level  · Limit or avoid alcohol  Ask your healthcare provider if alcohol is safe for you  You may not be able to drink alcohol if you take certain anxiety or depression medicines  Limit alcohol to 1 drink per day if you are a woman   Limit alcohol to 2 drinks per day if you are a man  A drink of alcohol is 12 ounces of beer, 5 ounces of wine, or 1½ ounces of liquor  Contact your healthcare provider if:   · Your symptoms get worse or do not get better with treatment  · You think your medicine may be causing side effects  · Your anxiety keeps you from doing your regular daily activities  · You have new symptoms since your last visit  · You have questions or concerns about your condition or care  Seek care immediately or call 911 if:   · You have chest pain, tightness, or heaviness that may spread to your shoulders, arms, jaw, neck, or back  · You feel like hurting yourself or someone else  · You feel dizzy, lightheaded, or faint  © 2014 [address] is for End User's use only and may not be sold, redistributed or otherwise used for commercial purposes  All illustrations and images included in CareNotes® are the copyrighted property of A D A M , Inc  or Dennis Jazlyn  The above information is an  only  It is not intended as medical advice for individual conditions or treatments  Talk to your doctor, nurse or pharmacist before following any medical regimen to see if it is safe and effective for you  Bipolar Disorder   WHAT YOU NEED TO KNOW:   What is bipolar disorder? Bipolar disorder is a long-term chemical imbalance that causes rapid changes in mood and behavior  High moods are called doc  Low moods are called depression  Sometimes you will feel manic and sometimes you will feel depressed  You can have alternating episodes of doc and depression  This is called a mixed bipolar state  What increases my risk for bipolar disorder? Bipolar disorder is caused by a chemical imbalance  You are more likely to have bipolar disorder if someone in your family has a mood disorder  Stress, and drug or alcohol abuse are the most common triggers for bipolar disorder symptoms     What are the signs and symptoms of doc? · Being easily distracted or agitated, or focusing all your attention on a goal     · Insomnia (trouble sleeping) or not needing as much sleep as usual     · Inflated self-esteem or belief in abilities     · Racing thoughts that may not make sense or be understood by others     · Speech that is faster than usual, or you talk more than usual     · Increased thoughts about sex     · Happy and care free, with a sudden change to anger or irritability     · Hallucinations that cause you to see and hear things that are not really there  What are the signs and symptoms depression? · Anger, worry, anxiousness, or irritability     · Lack of energy     · Sadness or emptiness     · Crying for long periods     · Low self-esteem or sense of worthlessness     · Negative thoughts or not caring about anything     · Too much or too little sleep  How is bipolar disorder treated? There is no cure for bipolar disorder, but medicines may be used to control your mood swings  You may need to see a therapist or psychiatrist regularly for counseling  You may need to go into the hospital for tests and treatment  Where can I find support and more information? · [address] [place] [address], Public Information & Communication Branch  [facility] [address] [place] [address] W, [address], [address]  Wu [place]bolledo [place] [address]   Phone: [phone]  Phone: [phone]  Web Address: Matthew tn  · Depression and [address] ([facility])  [address] , [address] [place] [address]  Phone: [phone]  Web Address: [employer]   Call 911 if:   · You think about hurting yourself or someone else      When should I contact my healthcare provider?    · You are having trouble managing your bipolar disorder       · You cannot sleep, or are sleeping all the time       · You cannot eat, or are eating more than usual      · You feel dizzy or your stomach is upset       · You cannot make it to your next appointment      · You have questions or concerns about your condition or care  CARE AGREEMENT:   You have the right to help plan your care  Learn about your health condition and how it may be treated  Discuss treatment options with your caregivers to decide what care you want to receive  You always have the right to refuse treatment  The above information is an  only  It is not intended as medical advice for individual conditions or treatments  Talk to your doctor, nurse or pharmacist before following any medical regimen to see if it is safe and effective for you  © 2017 [address]  Information is for End User's use only and may not be sold, redistributed or otherwise used for commercial purposes  All illustrations and images included in CareNotes® are the copyrighted property of A D A M , Inc  or Dennis Hobson      Bipolar Disorder   AMBULATORY CARE:   Bipolar disorder is a long-term chemical imbalance that causes rapid changes in mood and behavior  High moods are called doc  Low moods are called depression  Sometimes you will feel manic and sometimes you will feel depressed  You can have alternating episodes of doc and depression  This is called a mixed bipolar state  Call 911 if:   · You think about hurting yourself or someone else      Contact your healthcare provider or psychiatrist if:   · You are having trouble managing your bipolar disorder       · You cannot sleep, or are sleeping all the time       · You cannot eat, or are eating more than usual      · You feel dizzy or your stomach is upset       · You cannot make it to your next meeting      · You have questions or concerns about your condition or care    Common signs and symptoms of doc:   · Being easily distracted or agitated, or focusing all your attention on a goal     · Insomnia (trouble sleeping) or not needing as much sleep as usual     · Inflated self-esteem or belief in abilities     · Racing thoughts that may not make sense or be understood by others     · Speech that is faster than usual, or you talk more than usual     · Increased thoughts about sex     · Happy and care free, with a sudden change to anger or irritability     · Hallucinations that cause you to see and hear things that are not really there  Common signs and symptoms of depression:   · Anger, worry, anxiousness, or irritability     · Lack of energy     · Sadness or emptiness     · Crying for long periods     · Low self-esteem or sense of worthlessness     · Negative thoughts or not caring about anything     · Too much or too little sleep  Treatment for bipolar disorder may include medicines to control your mood swings  You may need to see a therapist or psychiatrist regularly for counseling  You may need to go into the hospital for tests and treatment  Follow up with your healthcare provider or psychiatrist as directed: Write down your questions so you remember to ask them during your visits  Manage bipolar disorder: Watch for triggers of bipolar disorder symptoms, such as stress  Learn new ways to relax, such as deep breathing, to manage your stress  Tell someone if you feel a manic or depressive period might be coming on  Ask a friend or family member to help watch you for bipolar symptoms  Work to develop skills that will help you manage bipolar disorder  You may need to make lifestyle changes  Ask your healthcare provider or psychiatrist for resources  © 2017 [facility] Information is for End User's use only and may not be sold, redistributed or otherwise used for commercial purposes  All illustrations and images included in CareNotes® are the copyrighted property of A D A M , Inc  or Dennis Hobson  The above information is an  only  It is not intended as medical advice for individual conditions or treatments   Talk to your doctor, nurse or pharmacist before following any medical regimen to see if it is safe and effective for you

## 2019-09-26 NOTE — PROGRESS NOTES
09/26/19 0800   Team Meeting   Meeting Type Daily Rounds   Team Members Present   Team Members Present Physician;Nurse;;; Other (Discipline and Name)   Physician Team Member Queta Cooper Team Member LUZ ELENA BLANCO [facility] Team Member Che   Social Work Team Member Haile Elizabeth   Patient/Family Present   Patient Present No   Patient's Family Present No     D/C today  Father will pick her up at 1 p m

## 2019-09-26 NOTE — PROGRESS NOTES
Patient denies SI/HI, A/V and depression/anxiety  Requested teaching on diagnosis  Handout reviewed with patient on signs and symptoms and what to look for in doc when outpatient  Stated "will give this to help them learn about my diagnosis my kids b/c they  me"  Pleasant and conversational  Visible on the milieu and social with other patients

## 2019-11-14 ENCOUNTER — HOSPITAL ENCOUNTER (INPATIENT)
Facility: HOSPITAL | Age: 54
LOS: 6 days | Discharge: HOME | DRG: 885 | End: 2019-11-20
Attending: EMERGENCY MEDICINE | Admitting: PSYCHIATRY & NEUROLOGY
Payer: MEDICARE

## 2019-11-14 DIAGNOSIS — K21.9 GERD (GASTROESOPHAGEAL REFLUX DISEASE): ICD-10-CM

## 2019-11-14 DIAGNOSIS — F31.4 BIPOLAR AFFECTIVE DISORDER, DEPRESSED, SEVERE (HCC): Primary | ICD-10-CM

## 2019-11-14 DIAGNOSIS — G47.00 INSOMNIA: ICD-10-CM

## 2019-11-14 DIAGNOSIS — F10.20 ALCOHOL USE DISORDER, SEVERE, DEPENDENCE (HCC): ICD-10-CM

## 2019-11-14 DIAGNOSIS — E03.9 HYPOTHYROIDISM, UNSPECIFIED TYPE: ICD-10-CM

## 2019-11-14 DIAGNOSIS — R56.9 SEIZURE (HCC): ICD-10-CM

## 2019-11-14 DIAGNOSIS — F41.1 GENERALIZED ANXIETY DISORDER: Chronic | ICD-10-CM

## 2019-11-14 DIAGNOSIS — E03.9 HYPOTHYROIDISM: ICD-10-CM

## 2019-11-14 DIAGNOSIS — K29.70 GASTRITIS WITHOUT BLEEDING, UNSPECIFIED CHRONICITY, UNSPECIFIED GASTRITIS TYPE: ICD-10-CM

## 2019-11-14 DIAGNOSIS — I10 ESSENTIAL HYPERTENSION: ICD-10-CM

## 2019-11-14 PROBLEM — R00.2 HEART PALPITATIONS: Status: ACTIVE | Noted: 2017-12-14

## 2019-11-14 PROBLEM — F43.10 PTSD (POST-TRAUMATIC STRESS DISORDER): Chronic | Status: ACTIVE | Noted: 2019-05-25

## 2019-11-14 PROBLEM — F43.12 POST-TRAUMATIC STRESS DISORDER, CHRONIC: Chronic | Status: ACTIVE | Noted: 2019-05-25

## 2019-11-14 LAB
ALBUMIN SERPL BCP-MCNC: 3.9 G/DL (ref 3–5.2)
ALP SERPL-CCNC: 60 U/L (ref 43–122)
ALT SERPL W P-5'-P-CCNC: 59 U/L (ref 9–52)
AMPHETAMINES SERPL QL SCN: NEGATIVE
AMPHETAMINES SERPL QL SCN: NEGATIVE
ANION GAP SERPL CALCULATED.3IONS-SCNC: 7 MMOL/L (ref 5–14)
AST SERPL W P-5'-P-CCNC: 36 U/L (ref 14–36)
BACTERIA UR QL AUTO: ABNORMAL /HPF
BARBITURATES UR QL: NEGATIVE
BARBITURATES UR QL: NEGATIVE
BASOPHILS # BLD AUTO: 0 THOUSANDS/ΜL (ref 0–0.1)
BASOPHILS NFR BLD AUTO: 0 % (ref 0–1)
BENZODIAZ UR QL: NEGATIVE
BENZODIAZ UR QL: NEGATIVE
BILIRUB SERPL-MCNC: 0.8 MG/DL
BILIRUB UR QL STRIP: NEGATIVE
BUN SERPL-MCNC: 16 MG/DL (ref 5–25)
CALCIUM SERPL-MCNC: 8.4 MG/DL (ref 8.4–10.2)
CHLORIDE SERPL-SCNC: 101 MMOL/L (ref 97–108)
CLARITY UR: CLEAR
CO2 SERPL-SCNC: 28 MMOL/L (ref 22–30)
COCAINE UR QL: NEGATIVE
COCAINE UR QL: NEGATIVE
COLOR UR: ABNORMAL
CREAT SERPL-MCNC: 0.71 MG/DL (ref 0.6–1.2)
EOSINOPHIL # BLD AUTO: 0 THOUSAND/ΜL (ref 0–0.4)
EOSINOPHIL NFR BLD AUTO: 0 % (ref 0–6)
ERYTHROCYTE [DISTWIDTH] IN BLOOD BY AUTOMATED COUNT: 13.6 %
ETHANOL EXG-MCNC: 0.09 MG/DL
GFR SERPL CREATININE-BSD FRML MDRD: 98 ML/MIN/1.73SQ M
GLUCOSE SERPL-MCNC: 130 MG/DL (ref 70–99)
GLUCOSE UR STRIP-MCNC: NEGATIVE MG/DL
HCT VFR BLD AUTO: 41.5 % (ref 36–46)
HGB BLD-MCNC: 14.5 G/DL (ref 12–16)
HGB UR QL STRIP.AUTO: NEGATIVE
KETONES UR STRIP-MCNC: NEGATIVE MG/DL
LEUKOCYTE ESTERASE UR QL STRIP: NEGATIVE
LYMPHOCYTES # BLD AUTO: 2.2 THOUSANDS/ΜL (ref 0.5–4)
LYMPHOCYTES NFR BLD AUTO: 21 % (ref 25–45)
MCH RBC QN AUTO: 33.4 PG (ref 26–34)
MCHC RBC AUTO-ENTMCNC: 35 G/DL (ref 31–36)
MCV RBC AUTO: 95 FL (ref 80–100)
METHADONE UR QL: NEGATIVE
METHADONE UR QL: NEGATIVE
MONOCYTES # BLD AUTO: 0.9 THOUSAND/ΜL (ref 0.2–0.9)
MONOCYTES NFR BLD AUTO: 8 % (ref 1–10)
MUCOUS THREADS UR QL AUTO: ABNORMAL
NEUTROPHILS # BLD AUTO: 7.5 THOUSANDS/ΜL (ref 1.8–7.8)
NEUTS SEG NFR BLD AUTO: 70 % (ref 45–65)
NITRITE UR QL STRIP: NEGATIVE
NON-SQ EPI CELLS URNS QL MICRO: ABNORMAL /HPF
OPIATES UR QL SCN: NEGATIVE
OPIATES UR QL SCN: NEGATIVE
PCP UR QL: NEGATIVE
PCP UR QL: NEGATIVE
PH UR STRIP.AUTO: 5 [PH]
PLATELET # BLD AUTO: 377 THOUSANDS/UL (ref 150–450)
PMV BLD AUTO: 6.9 FL (ref 8.9–12.7)
POTASSIUM SERPL-SCNC: 4 MMOL/L (ref 3.6–5)
PROT SERPL-MCNC: 6.8 G/DL (ref 5.9–8.4)
PROT UR STRIP-MCNC: ABNORMAL MG/DL
RBC # BLD AUTO: 4.35 MILLION/UL (ref 4–5.2)
RBC #/AREA URNS AUTO: ABNORMAL /HPF
SODIUM SERPL-SCNC: 136 MMOL/L (ref 137–147)
SP GR UR STRIP.AUTO: 1.03 (ref 1–1.04)
THC UR QL: NEGATIVE
THC UR QL: NEGATIVE
TSH SERPL DL<=0.05 MIU/L-ACNC: 1.55 UIU/ML (ref 0.47–4.68)
UROBILINOGEN UA: 1 MG/DL
WBC # BLD AUTO: 10.6 THOUSAND/UL (ref 4.5–11)
WBC #/AREA URNS AUTO: ABNORMAL /HPF

## 2019-11-14 PROCEDURE — 84443 ASSAY THYROID STIM HORMONE: CPT | Performed by: EMERGENCY MEDICINE

## 2019-11-14 PROCEDURE — 85025 COMPLETE CBC W/AUTO DIFF WBC: CPT | Performed by: EMERGENCY MEDICINE

## 2019-11-14 PROCEDURE — 99285 EMERGENCY DEPT VISIT HI MDM: CPT

## 2019-11-14 PROCEDURE — 80053 COMPREHEN METABOLIC PANEL: CPT | Performed by: EMERGENCY MEDICINE

## 2019-11-14 PROCEDURE — 80307 DRUG TEST PRSMV CHEM ANLYZR: CPT | Performed by: EMERGENCY MEDICINE

## 2019-11-14 PROCEDURE — 93005 ELECTROCARDIOGRAM TRACING: CPT

## 2019-11-14 PROCEDURE — 36415 COLL VENOUS BLD VENIPUNCTURE: CPT | Performed by: EMERGENCY MEDICINE

## 2019-11-14 PROCEDURE — 81001 URINALYSIS AUTO W/SCOPE: CPT | Performed by: NURSE PRACTITIONER

## 2019-11-14 PROCEDURE — 82075 ASSAY OF BREATH ETHANOL: CPT | Performed by: EMERGENCY MEDICINE

## 2019-11-14 PROCEDURE — 99285 EMERGENCY DEPT VISIT HI MDM: CPT | Performed by: EMERGENCY MEDICINE

## 2019-11-14 RX ORDER — HALOPERIDOL 5 MG/ML
5 INJECTION INTRAMUSCULAR EVERY 6 HOURS PRN
Status: DISCONTINUED | OUTPATIENT
Start: 2019-11-14 | End: 2019-11-20 | Stop reason: HOSPADM

## 2019-11-14 RX ORDER — GABAPENTIN 400 MG/1
400 CAPSULE ORAL 3 TIMES DAILY
Status: DISCONTINUED | OUTPATIENT
Start: 2019-11-14 | End: 2019-11-20 | Stop reason: HOSPADM

## 2019-11-14 RX ORDER — OLANZAPINE 5 MG/1
5 TABLET ORAL EVERY 6 HOURS PRN
Status: DISCONTINUED | OUTPATIENT
Start: 2019-11-14 | End: 2019-11-20 | Stop reason: HOSPADM

## 2019-11-14 RX ORDER — MAGNESIUM HYDROXIDE/ALUMINUM HYDROXICE/SIMETHICONE 120; 1200; 1200 MG/30ML; MG/30ML; MG/30ML
30 SUSPENSION ORAL EVERY 4 HOURS PRN
Status: DISCONTINUED | OUTPATIENT
Start: 2019-11-14 | End: 2019-11-20 | Stop reason: HOSPADM

## 2019-11-14 RX ORDER — AMLODIPINE BESYLATE 5 MG/1
5 TABLET ORAL DAILY
Status: DISCONTINUED | OUTPATIENT
Start: 2019-11-15 | End: 2019-11-20 | Stop reason: HOSPADM

## 2019-11-14 RX ORDER — LISINOPRIL 20 MG/1
20 TABLET ORAL DAILY
Status: DISCONTINUED | OUTPATIENT
Start: 2019-11-15 | End: 2019-11-20 | Stop reason: HOSPADM

## 2019-11-14 RX ORDER — ATORVASTATIN CALCIUM 40 MG/1
40 TABLET, FILM COATED ORAL
Status: DISCONTINUED | OUTPATIENT
Start: 2019-11-15 | End: 2019-11-20 | Stop reason: HOSPADM

## 2019-11-14 RX ORDER — BENZTROPINE MESYLATE 1 MG/ML
1 INJECTION INTRAMUSCULAR; INTRAVENOUS EVERY 6 HOURS PRN
Status: DISCONTINUED | OUTPATIENT
Start: 2019-11-14 | End: 2019-11-20 | Stop reason: HOSPADM

## 2019-11-14 RX ORDER — ESCITALOPRAM OXALATE 10 MG/1
10 TABLET ORAL DAILY
Status: DISCONTINUED | OUTPATIENT
Start: 2019-11-15 | End: 2019-11-15

## 2019-11-14 RX ORDER — HYDROCHLOROTHIAZIDE 25 MG/1
25 TABLET ORAL DAILY
Status: DISCONTINUED | OUTPATIENT
Start: 2019-11-15 | End: 2019-11-20 | Stop reason: HOSPADM

## 2019-11-14 RX ORDER — THIAMINE MONONITRATE (VIT B1) 100 MG
100 TABLET ORAL DAILY
Status: DISCONTINUED | OUTPATIENT
Start: 2019-11-15 | End: 2019-11-20 | Stop reason: HOSPADM

## 2019-11-14 RX ORDER — FOLIC ACID 1 MG/1
1 TABLET ORAL DAILY
Status: DISCONTINUED | OUTPATIENT
Start: 2019-11-15 | End: 2019-11-20 | Stop reason: HOSPADM

## 2019-11-14 RX ORDER — ACETAMINOPHEN 325 MG/1
975 TABLET ORAL EVERY 6 HOURS PRN
Status: DISCONTINUED | OUTPATIENT
Start: 2019-11-14 | End: 2019-11-20 | Stop reason: HOSPADM

## 2019-11-14 RX ORDER — PANTOPRAZOLE SODIUM 40 MG/1
40 TABLET, DELAYED RELEASE ORAL
Status: DISCONTINUED | OUTPATIENT
Start: 2019-11-15 | End: 2019-11-20 | Stop reason: HOSPADM

## 2019-11-14 RX ORDER — LORAZEPAM 2 MG/ML
1 INJECTION INTRAMUSCULAR EVERY 6 HOURS PRN
Status: DISCONTINUED | OUTPATIENT
Start: 2019-11-14 | End: 2019-11-20 | Stop reason: HOSPADM

## 2019-11-14 RX ORDER — HALOPERIDOL 5 MG
5 TABLET ORAL EVERY 6 HOURS PRN
Status: DISCONTINUED | OUTPATIENT
Start: 2019-11-14 | End: 2019-11-20 | Stop reason: HOSPADM

## 2019-11-14 RX ORDER — HYDROXYZINE HYDROCHLORIDE 25 MG/1
25 TABLET, FILM COATED ORAL EVERY 6 HOURS PRN
Status: DISCONTINUED | OUTPATIENT
Start: 2019-11-14 | End: 2019-11-20 | Stop reason: HOSPADM

## 2019-11-14 RX ORDER — ACETAMINOPHEN 325 MG/1
650 TABLET ORAL EVERY 4 HOURS PRN
Status: DISCONTINUED | OUTPATIENT
Start: 2019-11-14 | End: 2019-11-20 | Stop reason: HOSPADM

## 2019-11-14 RX ORDER — LEVOTHYROXINE SODIUM 0.05 MG/1
50 TABLET ORAL DAILY
Status: DISCONTINUED | OUTPATIENT
Start: 2019-11-15 | End: 2019-11-20 | Stop reason: HOSPADM

## 2019-11-14 RX ORDER — TRAZODONE HYDROCHLORIDE 50 MG/1
50 TABLET ORAL
Status: DISCONTINUED | OUTPATIENT
Start: 2019-11-14 | End: 2019-11-20 | Stop reason: HOSPADM

## 2019-11-14 RX ORDER — HYDROXYZINE 50 MG/1
50 TABLET, FILM COATED ORAL EVERY 6 HOURS PRN
Status: DISCONTINUED | OUTPATIENT
Start: 2019-11-14 | End: 2019-11-20 | Stop reason: HOSPADM

## 2019-11-14 RX ORDER — LORAZEPAM 1 MG/1
1 TABLET ORAL EVERY 6 HOURS PRN
Status: DISCONTINUED | OUTPATIENT
Start: 2019-11-14 | End: 2019-11-20 | Stop reason: HOSPADM

## 2019-11-14 RX ORDER — OLANZAPINE 10 MG/1
5 INJECTION, POWDER, LYOPHILIZED, FOR SOLUTION INTRAMUSCULAR EVERY 6 HOURS PRN
Status: DISCONTINUED | OUTPATIENT
Start: 2019-11-14 | End: 2019-11-20 | Stop reason: HOSPADM

## 2019-11-14 RX ORDER — SUCRALFATE 1 G/1
1 TABLET ORAL
Status: DISCONTINUED | OUTPATIENT
Start: 2019-11-14 | End: 2019-11-20 | Stop reason: HOSPADM

## 2019-11-14 RX ORDER — ACETAMINOPHEN 325 MG/1
650 TABLET ORAL EVERY 6 HOURS PRN
Status: DISCONTINUED | OUTPATIENT
Start: 2019-11-14 | End: 2019-11-20 | Stop reason: HOSPADM

## 2019-11-14 RX ORDER — BENZTROPINE MESYLATE 1 MG/1
1 TABLET ORAL EVERY 6 HOURS PRN
Status: DISCONTINUED | OUTPATIENT
Start: 2019-11-14 | End: 2019-11-20 | Stop reason: HOSPADM

## 2019-11-14 RX ORDER — LORAZEPAM 1 MG/1
1 TABLET ORAL 3 TIMES DAILY
Status: DISCONTINUED | OUTPATIENT
Start: 2019-11-14 | End: 2019-11-15

## 2019-11-14 RX ORDER — RISPERIDONE 1 MG/1
1 TABLET, ORALLY DISINTEGRATING ORAL
Status: DISCONTINUED | OUTPATIENT
Start: 2019-11-14 | End: 2019-11-20 | Stop reason: HOSPADM

## 2019-11-14 RX ORDER — ONDANSETRON 2 MG/ML
4 INJECTION INTRAMUSCULAR; INTRAVENOUS ONCE
Status: DISCONTINUED | OUTPATIENT
Start: 2019-11-14 | End: 2019-11-14

## 2019-11-14 RX ORDER — ONDANSETRON 4 MG/1
4 TABLET, ORALLY DISINTEGRATING ORAL ONCE
Status: COMPLETED | OUTPATIENT
Start: 2019-11-14 | End: 2019-11-14

## 2019-11-14 RX ORDER — TRAZODONE HYDROCHLORIDE 50 MG/1
50 TABLET ORAL
Status: DISCONTINUED | OUTPATIENT
Start: 2019-11-14 | End: 2019-11-15

## 2019-11-14 RX ADMIN — SUCRALFATE 1 G: 1 TABLET ORAL at 21:57

## 2019-11-14 RX ADMIN — GABAPENTIN 400 MG: 400 CAPSULE ORAL at 22:00

## 2019-11-14 RX ADMIN — LORAZEPAM 1 MG: 1 TABLET ORAL at 19:53

## 2019-11-14 RX ADMIN — LURASIDONE HYDROCHLORIDE 60 MG: 40 TABLET, FILM COATED ORAL at 21:57

## 2019-11-14 RX ADMIN — ONDANSETRON 4 MG: 4 TABLET, ORALLY DISINTEGRATING ORAL at 14:59

## 2019-11-14 RX ADMIN — METOPROLOL TARTRATE 50 MG: 25 TABLET, FILM COATED ORAL at 21:57

## 2019-11-14 RX ADMIN — TRAZODONE HYDROCHLORIDE 50 MG: 50 TABLET ORAL at 21:57

## 2019-11-14 NOTE — ED NOTES
Insurance Authorization for admission:   Secondary payor is Medical Assistance  Plan is FFS  EVS (Eligibility Verification System) called - [phone]  Automated system indicates: Eligible

## 2019-11-14 NOTE — ED NOTES
Patient is accepted at [facility]  Patient is accepted by Stephanie Head  Patient may go to the floor at [facility]  Nurse report is to be called to [phone] prior to patient transfer
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
## 2019-11-14 NOTE — ED PROVIDER NOTES
History  Chief Complaint   Patient presents with    Suicidal     would like to die, took 3 extra 300 mg gabapentin to be able to sleep  feeling more sad than ever  has no friends, and nothing to do  Patient is a 57-year-old female extensive mental health history  Presents emergency room for increased depression and suicidal ideations  States she wants to take a bunch of pills to harm herself  Denies actually taking anything today to harm herself  Denies any homicidal thoughts denies any auditory or visual hallucinations  She is otherwise without complaint  Prior to Admission Medications   Prescriptions Last Dose Informant Patient Reported? Taking?    amLODIPine (NORVASC) 5 mg tablet 11/14/2019 at Unknown time  No Yes   Sig: Take 1 tablet (5 mg total) by mouth daily   atorvastatin (LIPITOR) 40 mg tablet 11/14/2019 at Unknown time  No Yes   Sig: Take 1 tablet (40 mg total) by mouth daily with dinner   cloNIDine (CATAPRES) 0 1 mg tablet 11/14/2019 at Unknown time  No Yes   Sig: Take 1 tablet (0 1 mg total) by mouth 2 (two) times a day   escitalopram (LEXAPRO) 5 mg tablet 11/14/2019 at Unknown time  No Yes   Sig: Take 3 tablets (15 mg total) by mouth daily   folic acid (FOLVITE) 1 mg tablet 11/14/2019 at Unknown time  No Yes   Sig: Take 1 tablet (1 mg total) by mouth daily   gabapentin (NEURONTIN) 400 mg capsule 11/14/2019 at Unknown time  No Yes   Sig: Take 1 capsule (400 mg total) by mouth 3 (three) times a day   hydrochlorothiazide (HYDRODIURIL) 25 mg tablet 11/14/2019 at Unknown time  No Yes   Sig: Take 1 tablet (25 mg total) by mouth daily   levothyroxine 50 mcg tablet 11/14/2019 at Unknown time  No Yes   Sig: Take 1 tablet (50 mcg total) by mouth daily   lisinopril (ZESTRIL) 20 mg tablet 11/14/2019 at Unknown time  No Yes   Sig: Take 1 tablet (20 mg total) by mouth daily   lurasidone (LATUDA) 40 mg tablet 11/13/2019 at Unknown time  No Yes   Sig: Take 1 tablet (40 mg total) by mouth daily with dinner   metoprolol tartrate (LOPRESSOR) 50 mg tablet 11/14/2019 at Unknown time  No Yes   Sig: Take 1 tablet (50 mg total) by mouth every 12 (twelve) hours   naltrexone (REVIA) 50 mg tablet 11/14/2019 at Unknown time  No Yes   Sig: Take 1 tablet (50 mg total) by mouth daily   pantoprazole (PROTONIX) 40 mg tablet 11/14/2019 at Unknown time  No Yes   Sig: Take 1 tablet (40 mg total) by mouth daily in the early morning   sucralfate (CARAFATE) 1 g tablet   No No   Sig: Take 1 tablet (1 g total) by mouth 4 (four) times a day (before meals and at bedtime)   thiamine 100 MG tablet 11/14/2019 at Unknown time  No Yes   Sig: Take 1 tablet (100 mg total) by mouth daily   traZODone (DESYREL) 50 mg tablet 11/13/2019 at Unknown time  No Yes   Sig: Take 1 tablet (50 mg total) by mouth daily at bedtime      Facility-Administered Medications: None       Past Medical History:   Diagnosis Date    Alcoholism ([facility] )     Anxiety     Bipolar disorder (HCC)     Bowel obstruction (HCC)     Gastritis     Head injury     Heart palpitations     Hyperlipidemia     Hypertension     Hypothyroidism     PTSD (post-traumatic stress disorder)     Seizure ([facility] )     Suicide attempt [facility])        Past Surgical History:   Procedure Laterality Date    ABDOMINAL SURGERY      APPENDECTOMY      BOWEL RESECTION      CHOLECYSTECTOMY      ESOPHAGOGASTRODUODENOSCOPY N/A 5/18/2018    Procedure: ESOPHAGOGASTRODUODENOSCOPY (EGD) with bx;  Surgeon: Gretchen Alvarenga DO;  Location: [facility]; Service: Gastroenterology    HYSTERECTOMY      KNEE SURGERY Bilateral        Family History   Problem Relation Age of Onset    Diabetes Father      I have reviewed and agree with the history as documented  Social History     Tobacco Use    Smoking status: Current Every Day Smoker     Packs/day: 0 50     Years: 25 00     Pack years: 12 50     Types: Cigarettes    Smokeless tobacco: Never Used   Substance Use Topics    Alcohol use:  Yes     Alcohol/week: 21 0 standard drinks     Types: 21 Cans of beer per week     Frequency: 2-3 times a week     Drinks per session: 7 to 9     Binge frequency: Daily or almost daily     Comment: As of 11/14, reports 7 large beers about 2-3 times a week   Drug use: No        Review of Systems   Constitutional: Negative  Negative for chills and fever  HENT: Negative  Negative for rhinorrhea, sore throat, trouble swallowing and voice change  Eyes: Negative  Negative for pain and visual disturbance  Respiratory: Negative  Negative for cough, shortness of breath and wheezing  Cardiovascular: Negative  Negative for chest pain and palpitations  Gastrointestinal: Negative for abdominal pain, diarrhea, nausea and vomiting  Genitourinary: Negative  Negative for dysuria and frequency  Musculoskeletal: Negative  Negative for neck pain and neck stiffness  Skin: Negative  Negative for rash  Neurological: Negative  Negative for dizziness, speech difficulty, weakness, light-headedness and numbness  Psychiatric/Behavioral: Positive for dysphoric mood and suicidal ideas  The patient is nervous/anxious  Physical Exam  Physical Exam   Constitutional: She is oriented to person, place, and time  She appears well-developed and well-nourished  No distress  HENT:   Head: Normocephalic and atraumatic  Mouth/Throat: Oropharynx is clear and moist    Eyes: Pupils are equal, round, and reactive to light  Conjunctivae and EOM are normal    Neck: Normal range of motion  Neck supple  No tracheal deviation present  Cardiovascular: Normal rate, regular rhythm and intact distal pulses  Pulmonary/Chest: Effort normal and breath sounds normal  No respiratory distress  She has no wheezes  She has no rales  Abdominal: Soft  Bowel sounds are normal  She exhibits no distension  There is no tenderness  There is no rebound and no guarding  Musculoskeletal: Normal range of motion  She exhibits no tenderness or deformity  Neurological: She is alert and oriented to person, place, and time  Skin: Skin is warm and dry  Capillary refill takes less than 2 seconds  No rash noted  Psychiatric: She has a normal mood and affect  Her speech is delayed  She is withdrawn  She expresses impulsivity  She expresses suicidal ideation  She expresses suicidal plans  Nursing note and vitals reviewed        Vital Signs  ED Triage Vitals   Temperature Pulse Respirations Blood Pressure SpO2   11/14/19 1141 11/14/19 1141 11/14/19 1141 11/14/19 1141 11/14/19 1141   97 5 °F (36 4 °C) 75 18 115/59 98 %      Temp Source Heart Rate Source Patient Position - Orthostatic VS BP Location FiO2 (%)   11/14/19 1930 11/14/19 1847 11/14/19 1847 11/14/19 1847 --   Temporal Monitor Lying Left arm       Pain Score       11/14/19 1141       No Pain           Vitals:    11/15/19 1146 11/15/19 1536 11/15/19 1726 11/15/19 2106   BP: 157/81 104/60 108/63 128/76   Pulse: 67 81 72 84   Patient Position - Orthostatic VS: Sitting Sitting  Sitting         Visual Acuity      ED Medications  Medications   hydrOXYzine HCL (ATARAX) tablet 25 mg (has no administration in time range)   LORazepam (ATIVAN) tablet 1 mg (1 mg Oral Given 11/14/19 1953)   LORazepam (ATIVAN) 2 mg/mL injection 1 mg (has no administration in time range)   traZODone (DESYREL) tablet 50 mg (has no administration in time range)   risperiDONE (RisperDAL M-TABS) dispersible tablet 1 mg (has no administration in time range)   haloperidol (HALDOL) tablet 5 mg (has no administration in time range)   haloperidol lactate (HALDOL) injection 5 mg (has no administration in time range)   OLANZapine (ZyPREXA) tablet 5 mg (has no administration in time range)   OLANZapine (ZyPREXA) IM injection 5 mg (has no administration in time range)   magnesium hydroxide (MILK OF MAGNESIA) 400 mg/5 mL oral suspension 30 mL (has no administration in time range)   aluminum-magnesium hydroxide-simethicone (MYLANTA) 200-200-20 mg/5 mL oral suspension 30 mL (has no administration in time range)   benztropine (COGENTIN) tablet 1 mg (has no administration in time range)   benztropine (COGENTIN) injection 1 mg (has no administration in time range)   acetaminophen (TYLENOL) tablet 650 mg (has no administration in time range)   acetaminophen (TYLENOL) tablet 650 mg (has no administration in time range)   acetaminophen (TYLENOL) tablet 975 mg (has no administration in time range)   nicotine polacrilex (NICORETTE) gum 2 mg (has no administration in time range)   gabapentin (NEURONTIN) capsule 400 mg (400 mg Oral Given 11/15/19 2141)   levothyroxine tablet 50 mcg (50 mcg Oral Given 11/16/19 0610)   lisinopril (ZESTRIL) tablet 20 mg (20 mg Oral Given 11/15/19 0844)   pantoprazole (PROTONIX) EC tablet 40 mg (40 mg Oral Given 11/16/19 0610)   atorvastatin (LIPITOR) tablet 40 mg (40 mg Oral Given 62/21/48 5169)   folic acid (FOLVITE) tablet 1 mg (1 mg Oral Given 11/15/19 0843)   thiamine (VITAMIN B1) tablet 100 mg (100 mg Oral Given 11/15/19 0842)   amLODIPine (NORVASC) tablet 5 mg (5 mg Oral Given 11/15/19 0843)   hydrOXYzine HCL (ATARAX) tablet 50 mg (has no administration in time range)   hydrOXYzine HCL (ATARAX) tablet 75 mg (75 mg Oral Given 11/15/19 1254)   lurasidone (LATUDA) tablet 60 mg (60 mg Oral Given 11/15/19 1729)   sucralfate (CARAFATE) tablet 1 g (1 g Oral Given 11/16/19 0613)   metoprolol tartrate (LOPRESSOR) tablet 50 mg (50 mg Oral Given 11/15/19 2142)   hydrochlorothiazide (HYDRODIURIL) tablet 25 mg (25 mg Oral Given 11/15/19 0842)   cloNIDine (CATAPRES) tablet 0 1 mg (0 1 mg Oral Not Given 11/15/19 1727)   traZODone (DESYREL) tablet 150 mg (150 mg Oral Given 11/15/19 2142)   escitalopram (LEXAPRO) tablet 5 mg (has no administration in time range)   venlafaxine (EFFEXOR-XR) 24 hr capsule 75 mg (has no administration in time range)   lamoTRIgine (LaMICtal) tablet 25 mg (25 mg Oral Given 11/15/19 2142)   LORazepam (ATIVAN) tablet 0 5 mg (0 5 mg Oral Given 11/15/19 2142)   LORazepam (ATIVAN) tablet 0 5 mg (has no administration in time range)   nicotine (NICODERM CQ) 21 mg/24 hr TD 24 hr patch 21 mg (21 mg Transdermal Medication Applied 11/15/19 1631)   multivitamin-minerals (CENTRUM) tablet 1 tablet (has no administration in time range)   ondansetron (ZOFRAN-ODT) dispersible tablet 4 mg (4 mg Oral Given 11/14/19 1459)   venlafaxine (EFFEXOR-XR) 24 hr capsule 37 5 mg (37 5 mg Oral Given 11/15/19 1301)       Diagnostic Studies  Results Reviewed     Procedure Component Value Units Date/Time    TSH [[id number]]  (Normal) Collected:  11/14/19 1339    Lab Status:  Final result Specimen:  Blood from Arm, Left Updated:  11/14/19 1445     TSH 3RD GENERATON 1 550 uIU/mL     Narrative:       Patients undergoing fluorescein dye angiography may retain small amounts of fluorescein in the body for 48-72 hours post procedure  Samples containing fluorescein can produce falsely depressed TSH values  If the patient had this procedure,a specimen should be resubmitted post fluorescein clearance        Comprehensive metabolic panel [[id number]]  (Abnormal) Collected:  11/14/19 1339    Lab Status:  Final result Specimen:  Blood from Arm, Left Updated:  11/14/19 1421     Sodium 136 mmol/L      Potassium 4 0 mmol/L      Chloride 101 mmol/L      CO2 28 mmol/L      ANION GAP 7 mmol/L      BUN 16 mg/dL      Creatinine 0 71 mg/dL      Glucose 130 mg/dL      Calcium 8 4 mg/dL      AST 36 U/L      ALT 59 U/L      Alkaline Phosphatase 60 U/L      Total Protein 6 8 g/dL      Albumin 3 9 g/dL      Total Bilirubin 0 80 mg/dL      eGFR 98 ml/min/1 73sq m     Narrative:       Christina guidelines for Chronic Kidney Disease (CKD):     Stage 1 with normal or high GFR (GFR > 90 mL/min/1 73 square meters)    Stage 2 Mild CKD (GFR = 60-89 mL/min/1 73 square meters)    Stage 3A Moderate CKD (GFR = 45-59 mL/min/1 73 square meters)    Stage 3B Moderate CKD (GFR = 30-44 mL/min/1 73 square meters)    Stage 4 Severe CKD (GFR = 15-29 mL/min/1 73 square meters)    Stage 5 End Stage CKD (GFR <15 mL/min/1 73 square meters)  Note: GFR calculation is accurate only with a steady state creatinine    CBC and differential [[id number]]  (Abnormal) Collected:  11/14/19 1339    Lab Status:  Final result Specimen:  Blood from Arm, Left Updated:  11/14/19 1410     WBC 10 60 Thousand/uL      RBC 4 35 Million/uL      Hemoglobin 14 5 g/dL      Hematocrit 41 5 %      MCV 95 fL      MCH 33 4 pg      MCHC 35 0 g/dL      RDW 13 6 %      MPV 6 9 fL      Platelets 623 Thousands/uL      Neutrophils Relative 70 %      Lymphocytes Relative 21 %      Monocytes Relative 8 %      Eosinophils Relative 0 %      Basophils Relative 0 %      Neutrophils Absolute 7 50 Thousands/µL      Lymphocytes Absolute 2 20 Thousands/µL      Monocytes Absolute 0 90 Thousand/µL      Eosinophils Absolute 0 00 Thousand/µL      Basophils Absolute 0 00 Thousands/µL     Rapid drug screen, urine [[id number]]  (Normal) Collected:  11/14/19 1229    Lab Status:  Final result Specimen:  Urine, Other Updated:  11/14/19 1327     Amph/Meth UR Negative     Barbiturate Ur Negative     Benzodiazepine Urine Negative     Cocaine Urine Negative     Methadone Urine Negative     Opiate Urine Negative     PCP Ur Negative     THC Urine Negative    Narrative:       FOR MEDICAL PURPOSES ONLY  IF CONFIRMATION NEEDED PLEASE CONTACT THE LAB WITHIN 5 DAYS      Drug Screen Cutoff Levels:  AMPHETAMINE/METHAMPHETAMINES  1000 ng/mL  BARBITURATES     200 ng/mL  BENZODIAZEPINES     200 ng/mL  COCAINE      300 ng/mL  METHADONE      300 ng/mL  OPIATES      300 ng/mL  PHENCYCLIDINE     25 ng/mL  THC       50 ng/mL      POCT alcohol breath test [[id number]]  (Normal) Resulted:  11/14/19 1257    Lab Status:  Final result Updated:  11/14/19 1257     EXTBreath Alcohol 0 090                 No orders to display              Procedures  Procedures       ED Course  ED Course as of Nov 16 0739   Thu Nov 14, 2019   1257 Procedure Note: EKG  Date/Time: 11/14/19 12:57 PM   Performed by: Nancy Haywood  Authorized by: Nancy Haywood  ECG interpreted by me, the ED Provider: yes   The EKG demonstrates:  Rate 65  Rhythm sinus  QTc 445  No ST segment abnormalities appreciated          1309 Medically cleared for crisis evaluation                                  MDM  Number of Diagnoses or Management Options  Diagnosis management comments: Patient is a 65-year-old female presenting for concerns of suicidal ideations and depression  She is requesting to sign herself in and has agreed to a 201 admission  Patient is a alcohol level was 0 09  She has clear thought content does not appear to be under the influence of alcohol this time  Patient will be evaluated by crisis screening with disposition pending results of bed search        Disposition  Final diagnoses:   Hypothyroidism, unspecified type   Essential hypertension   Alcohol use disorder, severe, dependence ([facility] )   h/o Seizure ([facility] )     Time reflects when diagnosis was documented in both MDM as applicable and the Disposition within this note     Time User Action Codes Description Comment    11/14/2019  3:37 PM Galilea, Yenny Add [E03 9] Hypothyroidism, unspecified type     11/14/2019  3:37 PM Galilea, Yenny Modify [E03 9] Hypothyroidism, unspecified type     11/14/2019  3:37 PM Galilea, [address] Essential hypertension     11/14/2019  3:37 PM Galilea, [address] [F10 20] Alcohol use disorder, severe, dependence ([facility] )     11/14/2019  3:37 PM Galilea, [address] [R56 9] h/o Seizure [facility])       ED Disposition     None      MD Documentation      Most Recent Value   Accepting Physician  Mao Coronel Name, [address]   SL-Sloan   Sending MD Karl Garcia DO      RN Documentation      Most [address] [facility] Name, [address]   SL-Sloan      Follow-up Information Follow up With Specialties Details Why Contact Info    LENORA  Follow up Please go to your appointment on [address] [address]  Leticia Majo [address]    CONFRONT  Follow up  [address] Elisha Shearer    Treatment Trends  Follow up  [address], [address]  phone [phone]    Tam[address] PCP  Follow up  [address], [address] [facility]  phone [phone]  fax [phone]    [facility] Case Management  Follow up Please call them at [phone]  A referral was made for you at your last hospitalization in September of 2019             Current Discharge Medication List      CONTINUE these medications which have NOT CHANGED    Details   amLODIPine (NORVASC) 5 mg tablet Take 1 tablet (5 mg total) by mouth daily  Qty: 30 tablet, Refills: 0    Associated Diagnoses: Essential hypertension      atorvastatin (LIPITOR) 40 mg tablet Take 1 tablet (40 mg total) by mouth daily with dinner  Qty: 30 tablet, Refills: 0    Associated Diagnoses: Essential hypertension      cloNIDine (CATAPRES) 0 1 mg tablet Take 1 tablet (0 1 mg total) by mouth 2 (two) times a day  Qty: 60 tablet, Refills: 0    Associated Diagnoses: Essential hypertension      escitalopram (LEXAPRO) 5 mg tablet Take 3 tablets (15 mg total) by mouth daily  Qty: 90 tablet, Refills: 0    Associated Diagnoses: Bipolar affective disorder, depressed, severe (HCC)      folic acid (FOLVITE) 1 mg tablet Take 1 tablet (1 mg total) by mouth daily  Qty: 30 tablet, Refills: 0    Associated Diagnoses: Alcohol use disorder, severe, dependence (HCC)      gabapentin (NEURONTIN) 400 mg capsule Take 1 capsule (400 mg total) by mouth 3 (three) times a day  Qty: 30 capsule, Refills: 0    Associated Diagnoses: Alcohol use disorder, severe, dependence (HCC)      hydrochlorothiazide (HYDRODIURIL) 25 mg tablet Take 1 tablet (25 mg total) by mouth daily  Qty: 30 tablet, Refills: 0    Associated Diagnoses: Essential hypertension      levothyroxine 50 mcg tablet Take 1 tablet (50 mcg total) by mouth daily  Qty: 30 tablet, Refills: 0    Associated Diagnoses: Hypothyroidism      lisinopril (ZESTRIL) 20 mg tablet Take 1 tablet (20 mg total) by mouth daily  Qty: 30 tablet, Refills: 0    Associated Diagnoses: Essential hypertension      lurasidone (LATUDA) 40 mg tablet Take 1 tablet (40 mg total) by mouth daily with dinner  Qty: 30 tablet, Refills: 0    Associated Diagnoses: Bipolar affective disorder, depressed, severe (HCC)      metoprolol tartrate (LOPRESSOR) 50 mg tablet Take 1 tablet (50 mg total) by mouth every 12 (twelve) hours  Qty: 60 tablet, Refills: 0    Associated Diagnoses: Essential hypertension      naltrexone (REVIA) 50 mg tablet Take 1 tablet (50 mg total) by mouth daily  Qty: 30 tablet, Refills: 0    Associated Diagnoses: Alcohol use disorder, severe, dependence (HCC)      pantoprazole (PROTONIX) 40 mg tablet Take 1 tablet (40 mg total) by mouth daily in the early morning  Qty: 30 tablet, Refills: 0    Associated Diagnoses: GERD (gastroesophageal reflux disease)      thiamine 100 MG tablet Take 1 tablet (100 mg total) by mouth daily  Qty: 30 tablet, Refills: 0    Associated Diagnoses: Bipolar affective disorder, depressed, severe ([address] ); Alcohol use disorder, severe, dependence (HCC)      traZODone (DESYREL) 50 mg tablet Take 1 tablet (50 mg total) by mouth daily at bedtime  Qty: 30 tablet, Refills: 0    Associated Diagnoses: Alcohol use disorder, severe, dependence (HCC)      sucralfate (CARAFATE) 1 g tablet Take 1 tablet (1 g total) by mouth 4 (four) times a day (before meals and at bedtime)  Qty: 30 tablet, Refills: 0    Associated Diagnoses: Essential hypertension           No discharge procedures on file      ED Provider  Electronically Signed by           Simi Haddad DO  11/16/19 6660

## 2019-11-15 PROBLEM — R74.01 TRANSAMINITIS: Status: ACTIVE | Noted: 2019-11-15

## 2019-11-15 LAB
ALBUMIN SERPL BCP-MCNC: 3.5 G/DL (ref 3–5.2)
ALP SERPL-CCNC: 63 U/L (ref 43–122)
ALT SERPL W P-5'-P-CCNC: 61 U/L (ref 9–52)
ANION GAP SERPL CALCULATED.3IONS-SCNC: 6 MMOL/L (ref 5–14)
AST SERPL W P-5'-P-CCNC: 52 U/L (ref 14–36)
ATRIAL RATE: 65 BPM
ATRIAL RATE: 65 BPM
BASOPHILS # BLD AUTO: 0 THOUSANDS/ΜL (ref 0–0.1)
BASOPHILS NFR BLD AUTO: 0 % (ref 0–1)
BILIRUB SERPL-MCNC: 1.2 MG/DL
BUN SERPL-MCNC: 14 MG/DL (ref 5–25)
CALCIUM SERPL-MCNC: 8.7 MG/DL (ref 8.4–10.2)
CHLORIDE SERPL-SCNC: 101 MMOL/L (ref 97–108)
CHOLEST SERPL-MCNC: 97 MG/DL
CO2 SERPL-SCNC: 28 MMOL/L (ref 22–30)
CREAT SERPL-MCNC: 0.74 MG/DL (ref 0.6–1.2)
EOSINOPHIL # BLD AUTO: 0 THOUSAND/ΜL (ref 0–0.4)
EOSINOPHIL NFR BLD AUTO: 0 % (ref 0–6)
ERYTHROCYTE [DISTWIDTH] IN BLOOD BY AUTOMATED COUNT: 13.9 %
EST. AVERAGE GLUCOSE BLD GHB EST-MCNC: 108 MG/DL
GFR SERPL CREATININE-BSD FRML MDRD: 93 ML/MIN/1.73SQ M
GLUCOSE P FAST SERPL-MCNC: 93 MG/DL (ref 70–99)
GLUCOSE SERPL-MCNC: 93 MG/DL (ref 70–99)
HBA1C MFR BLD: 5.4 % (ref 4.2–6.3)
HCG SERPL QL: NEGATIVE
HCT VFR BLD AUTO: 42.3 % (ref 36–46)
HDLC SERPL-MCNC: 62 MG/DL
HGB BLD-MCNC: 14.6 G/DL (ref 12–16)
LDLC SERPL CALC-MCNC: 4 MG/DL
LYMPHOCYTES # BLD AUTO: 3.4 THOUSANDS/ΜL (ref 0.5–4)
LYMPHOCYTES NFR BLD AUTO: 39 % (ref 25–45)
MCH RBC QN AUTO: 33.1 PG (ref 26–34)
MCHC RBC AUTO-ENTMCNC: 34.6 G/DL (ref 31–36)
MCV RBC AUTO: 96 FL (ref 80–100)
MONOCYTES # BLD AUTO: 0.9 THOUSAND/ΜL (ref 0.2–0.9)
MONOCYTES NFR BLD AUTO: 10 % (ref 1–10)
NEUTROPHILS # BLD AUTO: 4.3 THOUSANDS/ΜL (ref 1.8–7.8)
NEUTS SEG NFR BLD AUTO: 50 % (ref 45–65)
NONHDLC SERPL-MCNC: 35 MG/DL
P AXIS: 30 DEGREES
P AXIS: 45 DEGREES
PLATELET # BLD AUTO: 325 THOUSANDS/UL (ref 150–450)
PMV BLD AUTO: 7.3 FL (ref 8.9–12.7)
POTASSIUM SERPL-SCNC: 3.9 MMOL/L (ref 3.6–5)
PR INTERVAL: 196 MS
PR INTERVAL: 200 MS
PROT SERPL-MCNC: 6.5 G/DL (ref 5.9–8.4)
QRS AXIS: 33 DEGREES
QRS AXIS: 46 DEGREES
QRSD INTERVAL: 66 MS
QRSD INTERVAL: 66 MS
QT INTERVAL: 412 MS
QT INTERVAL: 428 MS
QTC INTERVAL: 428 MS
QTC INTERVAL: 445 MS
RBC # BLD AUTO: 4.43 MILLION/UL (ref 4–5.2)
RPR SER QL: NORMAL
SODIUM SERPL-SCNC: 135 MMOL/L (ref 137–147)
T WAVE AXIS: 20 DEGREES
T WAVE AXIS: 25 DEGREES
TRIGL SERPL-MCNC: 157 MG/DL
TSH SERPL DL<=0.05 MIU/L-ACNC: 2.17 UIU/ML (ref 0.47–4.68)
VENTRICULAR RATE: 65 BPM
VENTRICULAR RATE: 65 BPM
WBC # BLD AUTO: 8.7 THOUSAND/UL (ref 4.5–11)

## 2019-11-15 PROCEDURE — 84443 ASSAY THYROID STIM HORMONE: CPT | Performed by: NURSE PRACTITIONER

## 2019-11-15 PROCEDURE — 80061 LIPID PANEL: CPT | Performed by: NURSE PRACTITIONER

## 2019-11-15 PROCEDURE — 83036 HEMOGLOBIN GLYCOSYLATED A1C: CPT | Performed by: PSYCHIATRY & NEUROLOGY

## 2019-11-15 PROCEDURE — 99222 1ST HOSP IP/OBS MODERATE 55: CPT | Performed by: FAMILY MEDICINE

## 2019-11-15 PROCEDURE — 85025 COMPLETE CBC W/AUTO DIFF WBC: CPT | Performed by: NURSE PRACTITIONER

## 2019-11-15 PROCEDURE — 86592 SYPHILIS TEST NON-TREP QUAL: CPT | Performed by: NURSE PRACTITIONER

## 2019-11-15 PROCEDURE — 99222 1ST HOSP IP/OBS MODERATE 55: CPT | Performed by: PSYCHIATRY & NEUROLOGY

## 2019-11-15 PROCEDURE — 84703 CHORIONIC GONADOTROPIN ASSAY: CPT | Performed by: NURSE PRACTITIONER

## 2019-11-15 PROCEDURE — 82652 VIT D 1 25-DIHYDROXY: CPT | Performed by: NURSE PRACTITIONER

## 2019-11-15 PROCEDURE — 93010 ELECTROCARDIOGRAM REPORT: CPT | Performed by: INTERNAL MEDICINE

## 2019-11-15 PROCEDURE — 80053 COMPREHEN METABOLIC PANEL: CPT | Performed by: NURSE PRACTITIONER

## 2019-11-15 RX ORDER — NICOTINE 21 MG/24HR
21 PATCH, TRANSDERMAL 24 HOURS TRANSDERMAL DAILY
Status: DISCONTINUED | OUTPATIENT
Start: 2019-11-15 | End: 2019-11-20 | Stop reason: HOSPADM

## 2019-11-15 RX ORDER — LORAZEPAM 0.5 MG/1
0.5 TABLET ORAL 2 TIMES DAILY
Status: COMPLETED | OUTPATIENT
Start: 2019-11-17 | End: 2019-11-18

## 2019-11-15 RX ORDER — CLONIDINE HYDROCHLORIDE 0.1 MG/1
0.1 TABLET ORAL 2 TIMES DAILY
Status: DISCONTINUED | OUTPATIENT
Start: 2019-11-15 | End: 2019-11-20 | Stop reason: HOSPADM

## 2019-11-15 RX ORDER — LAMOTRIGINE 25 MG/1
25 TABLET ORAL
Status: DISCONTINUED | OUTPATIENT
Start: 2019-11-15 | End: 2019-11-20 | Stop reason: HOSPADM

## 2019-11-15 RX ORDER — ESCITALOPRAM OXALATE 10 MG/1
5 TABLET ORAL DAILY
Status: COMPLETED | OUTPATIENT
Start: 2019-11-16 | End: 2019-11-16

## 2019-11-15 RX ORDER — VENLAFAXINE HYDROCHLORIDE 75 MG/1
75 CAPSULE, EXTENDED RELEASE ORAL DAILY
Status: DISCONTINUED | OUTPATIENT
Start: 2019-11-16 | End: 2019-11-17

## 2019-11-15 RX ORDER — LORAZEPAM 0.5 MG/1
0.5 TABLET ORAL 3 TIMES DAILY
Status: COMPLETED | OUTPATIENT
Start: 2019-11-15 | End: 2019-11-16

## 2019-11-15 RX ORDER — VENLAFAXINE HYDROCHLORIDE 37.5 MG/1
37.5 CAPSULE, EXTENDED RELEASE ORAL DAILY
Status: COMPLETED | OUTPATIENT
Start: 2019-11-15 | End: 2019-11-15

## 2019-11-15 RX ADMIN — NICOTINE 21 MG: 21 PATCH, EXTENDED RELEASE TRANSDERMAL at 16:31

## 2019-11-15 RX ADMIN — LURASIDONE HYDROCHLORIDE 60 MG: 40 TABLET, FILM COATED ORAL at 17:29

## 2019-11-15 RX ADMIN — FOLIC ACID 1 MG: 1 TABLET ORAL at 08:43

## 2019-11-15 RX ADMIN — TRAZODONE HYDROCHLORIDE 150 MG: 100 TABLET ORAL at 21:42

## 2019-11-15 RX ADMIN — LISINOPRIL 20 MG: 20 TABLET ORAL at 08:44

## 2019-11-15 RX ADMIN — LEVOTHYROXINE SODIUM 50 MCG: 50 TABLET ORAL at 05:40

## 2019-11-15 RX ADMIN — SUCRALFATE 1 G: 1 TABLET ORAL at 21:42

## 2019-11-15 RX ADMIN — LORAZEPAM 0.5 MG: 0.5 TABLET ORAL at 21:42

## 2019-11-15 RX ADMIN — VENLAFAXINE HYDROCHLORIDE 37.5 MG: 37.5 CAPSULE, EXTENDED RELEASE ORAL at 13:01

## 2019-11-15 RX ADMIN — ATORVASTATIN CALCIUM 40 MG: 40 TABLET, FILM COATED ORAL at 16:31

## 2019-11-15 RX ADMIN — HYDROXYZINE HYDROCHLORIDE 75 MG: 25 TABLET ORAL at 12:54

## 2019-11-15 RX ADMIN — SUCRALFATE 1 G: 1 TABLET ORAL at 08:43

## 2019-11-15 RX ADMIN — GABAPENTIN 400 MG: 400 CAPSULE ORAL at 21:41

## 2019-11-15 RX ADMIN — HYDROCHLOROTHIAZIDE 25 MG: 25 TABLET ORAL at 08:42

## 2019-11-15 RX ADMIN — AMLODIPINE BESYLATE 5 MG: 5 TABLET ORAL at 08:43

## 2019-11-15 RX ADMIN — SUCRALFATE 1 G: 1 TABLET ORAL at 16:31

## 2019-11-15 RX ADMIN — THIAMINE HCL TAB 100 MG 100 MG: 100 TAB at 08:42

## 2019-11-15 RX ADMIN — LORAZEPAM 0.5 MG: 0.5 TABLET ORAL at 16:31

## 2019-11-15 RX ADMIN — CLONIDINE HYDROCHLORIDE 0.1 MG: 0.1 TABLET ORAL at 13:00

## 2019-11-15 RX ADMIN — PANTOPRAZOLE SODIUM 40 MG: 40 TABLET, DELAYED RELEASE ORAL at 05:40

## 2019-11-15 RX ADMIN — GABAPENTIN 400 MG: 400 CAPSULE ORAL at 08:42

## 2019-11-15 RX ADMIN — LAMOTRIGINE 25 MG: 25 TABLET ORAL at 21:42

## 2019-11-15 RX ADMIN — ESCITALOPRAM OXALATE 10 MG: 10 TABLET ORAL at 08:42

## 2019-11-15 RX ADMIN — METOPROLOL TARTRATE 50 MG: 25 TABLET, FILM COATED ORAL at 08:44

## 2019-11-15 RX ADMIN — SUCRALFATE 1 G: 1 TABLET ORAL at 13:00

## 2019-11-15 RX ADMIN — GABAPENTIN 400 MG: 400 CAPSULE ORAL at 16:31

## 2019-11-15 RX ADMIN — LORAZEPAM 1 MG: 1 TABLET ORAL at 08:44

## 2019-11-15 RX ADMIN — METOPROLOL TARTRATE 50 MG: 25 TABLET, FILM COATED ORAL at 21:42

## 2019-11-15 NOTE — PLAN OF CARE
Problem: SELF HARM/SUICIDALITY  Goal: Will have no self-injury during hospital stay  Description  INTERVENTIONS:  - Q 7 MINUTES: Routine safety checks  - Q WAKING SHIFT & PRN: Assess risk to determine if routine checks are adequate to maintain patient safety  - Encourage patient to participate actively in care by formulating a plan to combat response to suicidal ideation, identify supports and resources   Outcome: Not Progressing     Problem: DEPRESSION  Goal: Will be euthymic at discharge  Description  INTERVENTIONS:  - Administer medication as ordered  - Provide emotional support via 1:1 interaction with staff  - Encourage involvement in milieu/groups/activities  - Monitor for social isolation  Outcome: Not Progressing     Problem: ANXIETY  Goal: Will report anxiety at manageable levels  Description  INTERVENTIONS:  - Administer medication as ordered  - Teach and encourage coping skills  - Provide emotional support  - Assess patient/family for anxiety and ability to cope  Outcome: Not Progressing  Goal: By discharge: Patient will verbalize 2 strategies to deal with anxiety  Description  Interventions:  - Identify any obvious source/trigger to anxiety  - Staff will assist patient in applying identified coping technique/skills  - Encourage attendance of scheduled groups and activities  Outcome: Not Progressing     Problem: SUBSTANCE USE/ABUSE  Goal: Will have no detox symptoms and will verbalize plan for changing substance-related behavior  Description  INTERVENTIONS:  - Monitor physical status and assess for symptoms of withdrawal  - Administer medication as ordered  - Provide emotional support with 1 on 1 interaction with staff  - Encourage recovery focused program/ addiction education  - Assess for verbalization of changing behaviors related to substance abuse  - Initiate consults and referrals as appropriate (Case Management, Spiritual Care, etc )  Outcome: Not Progressing  Goal: By discharge, will develop insight into their chemical dependency and sustain motivation to continue in recovery  Description  INTERVENTIONS:  - Attends all daily group sessions and scheduled AA groups  - Actively practices coping skills through participation in the therapeutic community and adherence to program rules  - Reviews and completes assignments from individual treatment plan  - Assist patient development of understanding of their personal cycle of addiction and relapse triggers  Outcome: Not Progressing  Goal: By discharge, patient will have ongoing treatment plan addressing chemical dependency  Description  INTERVENTIONS:  - Assist patient with resources and/or appointments for ongoing recovery based living  Outcome: Not Progressing     Problem: SELF CARE DEFICIT  Goal: Return ADL status to a safe level of function  Description  INTERVENTIONS:  - Administer medication as ordered  - Assess ADL deficits and provide assistive devices as needed  - Obtain PT/OT consults as needed  - Assist and instruct patient to increase activity and self care as tolerated  Outcome: Not Progressing

## 2019-11-15 NOTE — ASSESSMENT & PLAN NOTE
Patient's blood pressure is still slightly elevated  Continue lisinopril, metoprolol, Norvasc, HCTZ for now    Will adjust medications if systolic blood pressures over 170

## 2019-11-15 NOTE — PROGRESS NOTES
Patient admitted via ER on a 201 reporting she is depressed, anxious and has suicidal ideas with plan to overdose on her prescription medication  Stressors apparently are relationships but she denies this is the problem "this is depression "  She said she takes her medication "faithfully " Sees psychiatrist and counsellor at [facility]  She has had medication changes since discharge - but she is aware of all

## 2019-11-15 NOTE — PROGRESS NOTES
Patient is currently walking the unit slowly without interacting with others   Said the Atarax given earlier helped her anxiety "a little bit "

## 2019-11-15 NOTE — ASSESSMENT & PLAN NOTE
Patient noted to have mild transaminitis on presentation  Avoid hepatotoxic agents  Outpatient follow-up with PCP with repeat CMP in 2 weeks recommended    No further workup inpatient required
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
## 2019-11-15 NOTE — PROGRESS NOTES
Patient was reported high anxiety to another nurse and was given 75 mg Atarax po  Patient then told this RN that she thinks she is jittery because her neurontin schedule is off  Request was made to doctor to alter her scheduled times of administration

## 2019-11-15 NOTE — PLAN OF CARE
Problem: SELF HARM/SUICIDALITY  Goal: Will have no self-injury during hospital stay  Description  INTERVENTIONS:  - Q 7 MINUTES: Routine safety checks  - Q WAKING SHIFT & PRN: Assess risk to determine if routine checks are adequate to maintain patient safety  - Encourage patient to participate actively in care by formulating a plan to combat response to suicidal ideation, identify supports and resources   Outcome: Progressing     Problem: DEPRESSION  Goal: Will be euthymic at discharge  Description  INTERVENTIONS:  - Administer medication as ordered  - Provide emotional support via 1:1 interaction with staff  - Encourage involvement in milieu/groups/activities  - Monitor for social isolation  Outcome: Progressing     Problem: ANXIETY  Goal: Will report anxiety at manageable levels  Description  INTERVENTIONS:  - Administer medication as ordered  - Teach and encourage coping skills  - Provide emotional support  - Assess patient/family for anxiety and ability to cope  Outcome: Progressing  Goal: By discharge: Patient will verbalize 2 strategies to deal with anxiety  Description  Interventions:  - Identify any obvious source/trigger to anxiety  - Staff will assist patient in applying identified coping technique/skills  - Encourage attendance of scheduled groups and activities  Outcome: Progressing     Problem: SUBSTANCE USE/ABUSE  Goal: Will have no detox symptoms and will verbalize plan for changing substance-related behavior  Description  INTERVENTIONS:  - Monitor physical status and assess for symptoms of withdrawal  - Administer medication as ordered  - Provide emotional support with 1 on 1 interaction with staff  - Encourage recovery focused program/ addiction education  - Assess for verbalization of changing behaviors related to substance abuse  - Initiate consults and referrals as appropriate (Case Management, Spiritual Care, etc )  Outcome: Progressing  Goal: By discharge, will develop insight into their chemical dependency and sustain motivation to continue in recovery  Description  INTERVENTIONS:  - Attends all daily group sessions and scheduled AA groups  - Actively practices coping skills through participation in the therapeutic community and adherence to program rules  - Reviews and completes assignments from individual treatment plan  - Assist patient development of understanding of their personal cycle of addiction and relapse triggers  Outcome: Progressing  Goal: By discharge, patient will have ongoing treatment plan addressing chemical dependency  Description  INTERVENTIONS:  - Assist patient with resources and/or appointments for ongoing recovery based living  Outcome: Progressing     Problem: SELF CARE DEFICIT  Goal: Return ADL status to a safe level of function  Description  INTERVENTIONS:  - Administer medication as ordered  - Assess ADL deficits and provide assistive devices as needed  - Obtain PT/OT consults as needed  - Assist and instruct patient to increase activity and self care as tolerated  Outcome: Progressing

## 2019-11-15 NOTE — PROGRESS NOTES
11/15/19 0700   Team Meeting   Meeting Type Daily Rounds   Initial Conference Date 11/15/19   Team Members Present   Team Members Present Physician;;Nurse; Other (Discipline and Name)   Nursing Team Member [place] Work Team Member Sharon Melara   Other (Discipline and Name) Donetta Epley, med student,    Patient/Family Present   Patient Present No   Patient's Family Present No     Titrate medications
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
## 2019-11-15 NOTE — CONSULTS
Consult- Peter Dunaway 1965, 48 y o  female MRN: [id number]    Unit/Bed#: [facility]-01 Encounter: [id number]    Primary Care Provider: YONG Mitchell   Date and time admitted to hospital: 11/14/2019 11:36 AM      Inpatient consult for Medical Clearance for [address] patient  Consult performed by: Ruma Owens MD  Consult ordered by: YONG Colin          * Bipolar I disorder, most recent episode depressed, severe without psychotic features [facility])  Assessment & Plan  Patient is medically cleared to continue inpatient behavioral health treatment  Transaminitis  Assessment & Plan  Patient noted to have mild transaminitis on presentation  Avoid hepatotoxic agents  Outpatient follow-up with PCP with repeat CMP in 2 weeks recommended  No further workup inpatient required    Tobacco abuse  Assessment & Plan  Counseled on smoking cessation  Placed on nicotine replacement therapy    Hypercholesteremia  Assessment & Plan  Patient's lipid panel reviewed  Triglycerides are slightly elevated  Continue statin    Gastritis  Assessment & Plan  Continue Protonix  Stable at this time    Acquired hypothyroidism  Assessment & Plan  Continue Synthroid  TSH normal    Essential hypertension  Assessment & Plan  Patient's blood pressure is still slightly elevated  Continue lisinopril, metoprolol, Norvasc, HCTZ for now  Will adjust medications if systolic blood pressures over 170        VTE Prophylaxis: Reason for no pharmacologic prophylaxis Low risk  / reason for no mechanical VTE prophylaxis Low risk     Recommendations for Discharge:  · None    Counseling / Coordination of Care Time: 1 hour  Greater than 50% of total time spent on patient counseling and coordination of care  Collaboration of Care:  Were Recommendations Directly Discussed with Primary Treatment Team? - Yes     History of Present Illness:    Peter Dunaway is a 48 y o  female who is originally admitted to the psychiatric service due to behavioral health reasons  We are consulted for medical management  Patient denies any chest pain or shortness of breath or abdominal pain  Patient is here because she is depressed    Review of Systems:    Review of Systems   Constitutional: Negative for appetite change, chills, fatigue and fever  HENT: Negative for hearing loss, sore throat and trouble swallowing  Eyes: Negative for photophobia, discharge and visual disturbance  Respiratory: Negative for chest tightness and shortness of breath  Cardiovascular: Negative for chest pain and palpitations  Gastrointestinal: Negative for abdominal pain, blood in stool and vomiting  Endocrine: Negative for polydipsia and polyuria  Genitourinary: Negative for difficulty urinating, dysuria, flank pain and hematuria  Musculoskeletal: Negative for back pain and gait problem  Skin: Negative for rash  Allergic/Immunologic: Negative for environmental allergies and food allergies  Neurological: Negative for dizziness, seizures, syncope and headaches  Hematological: Does not bruise/bleed easily  Psychiatric/Behavioral: Positive for behavioral problems  The patient is nervous/anxious  All other systems reviewed and are negative  Past Medical and Surgical History:     Past Medical History:   Diagnosis Date    Alcoholism ([facility] )     Anxiety     Bipolar disorder (HCC)     Bowel obstruction (HCC)     Gastritis     Head injury     Heart palpitations     Hyperlipidemia     Hypertension     Hypothyroidism     PTSD (post-traumatic stress disorder)     [facility]     Suicide attempt [facility])        Past Surgical History:   Procedure Laterality Date    ABDOMINAL SURGERY      APPENDECTOMY      BOWEL RESECTION      CHOLECYSTECTOMY      ESOPHAGOGASTRODUODENOSCOPY N/A 5/18/2018    Procedure: ESOPHAGOGASTRODUODENOSCOPY (EGD) with bx;  Surgeon: Burgess Keyanna DO;  Location: [facility];   Service: Gastroenterology    HYSTERECTOMY      KNEE SURGERY Bilateral        Meds/Allergies:    all medications and allergies reviewed    Allergies: Allergies   Allergen Reactions    Latex Rash       Social History:     Marital Status:     Substance Use History:   Social History     Substance and Sexual Activity   Alcohol Use Yes    Alcohol/week: 21 0 standard drinks    Types: 21 Cans of beer per week    Frequency: 2-3 times a week    Drinks per session: 7 to 9    Binge frequency: Daily or almost daily    Comment: As of 11/14, reports 7 large beers about 2-3 times a week  Social History     Tobacco Use   Smoking Status Current Every Day Smoker    Packs/day: 0 50    Years: 25 00    Pack years: 12 50    Types: Cigarettes   Smokeless Tobacco Never Used     Social History     Substance and Sexual Activity   Drug Use No       Family History:    Family History   Problem Relation Age of Onset    Diabetes Father        Physical Exam:     Vitals:   Blood Pressure: 157/81 (11/15/19 1146)  Pulse: 67 (11/15/19 1146)  Temperature: 97 8 °F (36 6 °C) (11/15/19 0700)  Temp Source: Temporal (11/15/19 0700)  Respirations: 16 (11/15/19 1146)  Height: 5' 7" (170 2 cm) (11/14/19 1930)  Weight - Scale: 83 9 kg (184 lb 15 5 oz) (11/14/19 1930)  SpO2: 99 % (11/14/19 1847)    Physical Exam   Constitutional: She is oriented to person, place, and time  She appears well-developed and well-nourished  HENT:   Head: Normocephalic and atraumatic  Right Ear: External ear normal    Left Ear: External ear normal    Mouth/Throat: Oropharynx is clear and moist    Eyes: Conjunctivae and EOM are normal    Neck: Normal range of motion  Neck supple  Cardiovascular: Normal rate, regular rhythm and normal heart sounds  Pulmonary/Chest: Effort normal and breath sounds normal    Abdominal: Soft  Bowel sounds are normal  She exhibits no mass  There is no tenderness  There is no rebound and no guarding     Genitourinary:   Genitourinary Comments: deferred   Musculoskeletal: Normal range of motion  Neurological: She is alert and oriented to person, place, and time  She has normal reflexes  Cranial nerves 2-12 are normal   Normal neurological exam   Skin: Skin is warm and dry  No rash noted  Psychiatric: She has a normal mood and affect  Nursing note and vitals reviewed  Additional Data:     Lab Results: I have personally reviewed pertinent reports  Results from last 7 days   Lab Units 11/15/19  0548   WBC Thousand/uL 8 70   HEMOGLOBIN g/dL 14 6   HEMATOCRIT % 42 3   PLATELETS Thousands/uL 325   NEUTROS PCT % 50   LYMPHS PCT % 39   MONOS PCT % 10   EOS PCT % 0     Results from last 7 days   Lab Units 11/15/19  0548   SODIUM mmol/L 135*   POTASSIUM mmol/L 3 9   CHLORIDE mmol/L 101   CO2 mmol/L 28   BUN mg/dL 14   CREATININE mg/dL 0 74   ANION GAP mmol/L 6   CALCIUM mg/dL 8 7   ALBUMIN g/dL 3 5   TOTAL BILIRUBIN mg/dL 1 20   ALK PHOS U/L 63   ALT U/L 61*   AST U/L 52*   GLUCOSE RANDOM mg/dL 93             Lab Results   Component Value Date/Time    HGBA1C 5 4 11/15/2019 05:48 AM    HGBA1C 5 6 05/26/2019 06:26 AM    HGBA1C 5 5 05/17/2018 05:47 AM               Imaging: I have personally reviewed pertinent reports  No orders to display       EKG, Pathology, and Other Studies Reviewed on Admission:   · EKG:  Normal sinus rhythm    ** Please Note: This note has been constructed using a voice recognition system   **

## 2019-11-15 NOTE — PROGRESS NOTES
[facility] NOTE     11/15/19 1000   Activity/Group Checklist   Group Personal control  (Mindfulness)   Attendance Attended   Attendance Duration (min) 16-30  (left early, pulled by physician)   Interactions Interacted appropriately   Affect/Mood Appropriate   Goals Achieved Able to listen to others; Able to engage in interactions Explanation of exam/test/Position of comfort
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
## 2019-11-15 NOTE — PROGRESS NOTES
Patient did not receive Clonidine at 1727 as her blood pressure did not meet parameters  She was sitting in the television room for a period of time after dinner but has since gone to bed and she is sleeping

## 2019-11-15 NOTE — ED NOTES
Patient transported to floor with [address] [facility] and  via wheelchair  Patient had all belongings present  Patient in no signs of distress        Sharan Farmer RN  11/14/19 3796

## 2019-11-16 PROCEDURE — 99232 SBSQ HOSP IP/OBS MODERATE 35: CPT | Performed by: PSYCHIATRY & NEUROLOGY

## 2019-11-16 RX ADMIN — METOPROLOL TARTRATE 50 MG: 25 TABLET, FILM COATED ORAL at 08:53

## 2019-11-16 RX ADMIN — SUCRALFATE 1 G: 1 TABLET ORAL at 06:13

## 2019-11-16 RX ADMIN — VENLAFAXINE HYDROCHLORIDE 75 MG: 75 CAPSULE, EXTENDED RELEASE ORAL at 08:54

## 2019-11-16 RX ADMIN — LORAZEPAM 0.5 MG: 0.5 TABLET ORAL at 08:52

## 2019-11-16 RX ADMIN — NICOTINE POLACRILEX 2 MG: 2 GUM, CHEWING ORAL at 16:08

## 2019-11-16 RX ADMIN — SUCRALFATE 1 G: 1 TABLET ORAL at 21:33

## 2019-11-16 RX ADMIN — PANTOPRAZOLE SODIUM 40 MG: 40 TABLET, DELAYED RELEASE ORAL at 06:10

## 2019-11-16 RX ADMIN — GABAPENTIN 400 MG: 400 CAPSULE ORAL at 21:33

## 2019-11-16 RX ADMIN — METOPROLOL TARTRATE 50 MG: 25 TABLET, FILM COATED ORAL at 21:33

## 2019-11-16 RX ADMIN — THIAMINE HCL TAB 100 MG 100 MG: 100 TAB at 08:54

## 2019-11-16 RX ADMIN — NICOTINE 21 MG: 21 PATCH, EXTENDED RELEASE TRANSDERMAL at 09:17

## 2019-11-16 RX ADMIN — LAMOTRIGINE 25 MG: 25 TABLET ORAL at 21:33

## 2019-11-16 RX ADMIN — Medication 1 TABLET: at 08:54

## 2019-11-16 RX ADMIN — SUCRALFATE 1 G: 1 TABLET ORAL at 17:22

## 2019-11-16 RX ADMIN — GABAPENTIN 400 MG: 400 CAPSULE ORAL at 17:21

## 2019-11-16 RX ADMIN — SUCRALFATE 1 G: 1 TABLET ORAL at 12:16

## 2019-11-16 RX ADMIN — LURASIDONE HYDROCHLORIDE 60 MG: 40 TABLET, FILM COATED ORAL at 17:20

## 2019-11-16 RX ADMIN — GABAPENTIN 400 MG: 400 CAPSULE ORAL at 08:54

## 2019-11-16 RX ADMIN — ESCITALOPRAM OXALATE 5 MG: 10 TABLET ORAL at 08:53

## 2019-11-16 RX ADMIN — FOLIC ACID 1 MG: 1 TABLET ORAL at 08:54

## 2019-11-16 RX ADMIN — AMLODIPINE BESYLATE 5 MG: 5 TABLET ORAL at 08:54

## 2019-11-16 RX ADMIN — CLONIDINE HYDROCHLORIDE 0.1 MG: 0.1 TABLET ORAL at 08:53

## 2019-11-16 RX ADMIN — LEVOTHYROXINE SODIUM 50 MCG: 50 TABLET ORAL at 06:10

## 2019-11-16 RX ADMIN — LISINOPRIL 20 MG: 20 TABLET ORAL at 08:54

## 2019-11-16 RX ADMIN — ATORVASTATIN CALCIUM 40 MG: 40 TABLET, FILM COATED ORAL at 17:20

## 2019-11-16 RX ADMIN — TRAZODONE HYDROCHLORIDE 150 MG: 100 TABLET ORAL at 21:33

## 2019-11-16 RX ADMIN — HYDROCHLOROTHIAZIDE 25 MG: 25 TABLET ORAL at 08:53

## 2019-11-16 RX ADMIN — NICOTINE POLACRILEX 2 MG: 2 GUM, CHEWING ORAL at 09:19

## 2019-11-16 RX ADMIN — HYDROXYZINE HYDROCHLORIDE 75 MG: 25 TABLET ORAL at 14:21

## 2019-11-16 RX ADMIN — LORAZEPAM 0.5 MG: 0.5 TABLET ORAL at 17:22

## 2019-11-16 RX ADMIN — LORAZEPAM 0.5 MG: 0.5 TABLET ORAL at 21:33

## 2019-11-16 NOTE — PLAN OF CARE
Problem: SELF HARM/SUICIDALITY  Goal: Will have no self-injury during hospital stay  Description  INTERVENTIONS:  - Q 7 MINUTES: Routine safety checks  - Q WAKING SHIFT & PRN: Assess risk to determine if routine checks are adequate to maintain patient safety  - Encourage patient to participate actively in care by formulating a plan to combat response to suicidal ideation, identify supports and resources   Outcome: Progressing     Problem: ANXIETY  Goal: Will report anxiety at manageable levels  Description  INTERVENTIONS:  - Administer medication as ordered  - Teach and encourage coping skills  - Provide emotional support  - Assess patient/family for anxiety and ability to cope  Outcome: Progressing  Goal: By discharge: Patient will verbalize 2 strategies to deal with anxiety  Description  Interventions:  - Identify any obvious source/trigger to anxiety  - Staff will assist patient in applying identified coping technique/skills  - Encourage attendance of scheduled groups and activities  Outcome: Progressing     Problem: SUBSTANCE USE/ABUSE  Goal: Will have no detox symptoms and will verbalize plan for changing substance-related behavior  Description  INTERVENTIONS:  - Monitor physical status and assess for symptoms of withdrawal  - Administer medication as ordered  - Provide emotional support with 1 on 1 interaction with staff  - Encourage recovery focused program/ addiction education  - Assess for verbalization of changing behaviors related to substance abuse  - Initiate consults and referrals as appropriate (Case Management, Spiritual Care, etc )  Outcome: Progressing  Goal: By discharge, will develop insight into their chemical dependency and sustain motivation to continue in recovery  Description  INTERVENTIONS:  - Attends all daily group sessions and scheduled AA groups  - Actively practices coping skills through participation in the therapeutic community and adherence to program rules  - Reviews and completes assignments from individual treatment plan  - Assist patient development of understanding of their personal cycle of addiction and relapse triggers  Outcome: Progressing  Goal: By discharge, patient will have ongoing treatment plan addressing chemical dependency  Description  INTERVENTIONS:  - Assist patient with resources and/or appointments for ongoing recovery based living  Outcome: Progressing     Problem: SELF CARE DEFICIT  Goal: Return ADL status to a safe level of function  Description  INTERVENTIONS:  - Administer medication as ordered  - Assess ADL deficits and provide assistive devices as needed  - Obtain PT/OT consults as needed  - Assist and instruct patient to increase activity and self care as tolerated  Outcome: Progressing     Problem: DISCHARGE PLANNING  Goal: Discharge to home or other facility with appropriate resources  Description  INTERVENTIONS:  - Identify barriers to discharge w/patient and caregiver  - Arrange for needed discharge resources and transportation as appropriate  - Identify discharge learning needs (meds, wound care, etc )  - Arrange for interpretive services to assist at discharge as needed  - Refer to Case Management Department for coordinating discharge planning if the patient needs post-hospital services based on physician/advanced practitioner order or complex needs related to functional status, cognitive ability, or social support system  Outcome: Progressing     Problem: Ineffective Coping  Goal: Participates in unit activities  Description  Interventions:  - Provide therapeutic environment   - Provide required programming   - Redirect inappropriate behaviors   Outcome: Progressing     Problem: DEPRESSION  Goal: Will be euthymic at discharge  Description  INTERVENTIONS:  - Administer medication as ordered  - Provide emotional support via 1:1 interaction with staff  - Encourage involvement in milieu/groups/activities  - Monitor for social isolation  Outcome: Not Progressing

## 2019-11-16 NOTE — PROGRESS NOTES
Patient denies SI but continues to complain of depression  Pt does appear to be depressed, has a flat expression/affect, has been mostly secluded to her room  Pt is cooperative and polite with RN  Will continue to monitor

## 2019-11-16 NOTE — PROGRESS NOTES
Progress Note - Leticia Rosario 139[address] 48 y o  female MRN: [id number]   Unit/Bed#: [facility] 343-01 Encounter: [id number]    Behavior over the last 24 hours: minimal improvement  Og Mariano still feels anxious and depressed, still has blunted affect and psychomotor retardation  She reports that suicidal thoughts are less frequent   Limited participation in milieu - stays in her room, does not interact much with peers or staff  Compliant with medications  Sleep: decreased, slept off and on  Appetite: normal  Medication side effects: No   ROS: no complaints, denies any headache, shortness of breath or chest pain    Mental Status Evaluation:    Appearance:  disheveled, dressed in hospital attire   Behavior:  cooperative, poor eye contact, psychomotor retardation   Speech:  scant, soft   Mood:  depressed, anxious   Affect:  blunted   Thought Process:  organized, concrete   Associations: concrete associations   Thought Content:  no overt delusions   Perceptual Disturbances: no auditory hallucinations, no visual hallucinations   Risk Potential: Suicidal ideation - Yes, without plan  Homicidal ideation - None  Potential for aggression - No   Sensorium:  oriented to person, place and time/date   Memory:  recent and remote memory grossly intact   Consciousness:  alert and awake   Attention: poor concentration and poor attention span   Insight:  impaired   Judgment: impaired   Gait/Station: normal gait/station, normal balance   Motor Activity: no abnormal movements     Vital signs in last 24 hours:    Temp:  [98 °F (36 7 °C)-98 3 °F (36 8 °C)] 98 3 °F (36 8 °C)  HR:  [67-84] 80  Resp:  [16] 16  BP: (104-157)/(60-85) 146/85    Laboratory results: I have personally reviewed all pertinent laboratory/tests results      Suicide/Homicide Risk Assessment:    Risk of Harm to Self:   Protective Factors: ability to communicate with staff on the unit, able to contract for safety on the unit, taking medications as ordered on the unit  Based on today's assessment, Blayne Lawrence presents the following risk of harm to self: moderate    Risk of Harm to Others:  Based on today's assessment, Blayne Lawrence presents the following risk of harm to others: none    The following interventions are recommended: behavioral checks every 7 minutes, continued hospitalization on locked unit    Progress Toward Goals: minimal progress, still anxious, still depressed, poor motivation, still has passive suicidal thoughts    Assessment/Plan   Principal Problem:    Bipolar I disorder, most recent episode depressed, severe without psychotic features ([facility] 75 )  Active Problems:    Post-traumatic stress disorder, chronic    Generalized anxiety disorder    Uncomplicated alcohol dependence ([facility] 75 )    Essential hypertension    Acquired hypothyroidism    Gastritis    Hypercholesteremia    Tobacco abuse    Transaminitis    Recommended Treatment:     Planned medication and treatment changes:     All current active medications have been reviewed  Encourage group therapy, milieu therapy and occupational therapy  Behavioral Health checks every 7 minutes  Increase Effexor XR to 75 mg daily and titrate dose to 150 mg daily to help with depressive symptoms  Discontinue Lexapro  Taper off Ativan gradually    Continue all other medications:    Current Facility-Administered Medications:  acetaminophen 650 mg Oral Q6H PRN Yenny L Galilea, CRNP   acetaminophen 650 mg Oral Q4H PRN Yenny L Galilea, CRNP   acetaminophen 975 mg Oral Q6H PRN Yenny L Galilea, CRNP   aluminum-magnesium hydroxide-simethicone 30 mL Oral Q4H PRN Yenny L Galilea, CRNP   amLODIPine 5 mg Oral Daily Yenyn L Galilea, CRNP   atorvastatin 40 mg Oral Daily With Dinner Yenny L Galilea, CRNP   benztropine 1 mg Intramuscular Q6H PRN Yenny L Galilea, CRNP   benztropine 1 mg Oral Q6H PRN Yenny L Galilea, CRNP   cloNIDine 0 1 mg Oral BID Hernandez Sandhu MD   folic acid 1 mg Oral Daily Yenny L Galilea, CRNP   gabapentin 400 mg Oral TID Yenny L Galilea, CRNP   haloperidol 5 mg Oral Q6H PRN Yenny L Galilea, CRNP   haloperidol lactate 5 mg Intramuscular Q6H PRN Yenny L Galilea, CRNP   hydrochlorothiazide 25 mg Oral Daily Graciela Cazares MD   hydrOXYzine HCL 25 mg Oral Q6H PRN Yenny L Galilea, CRNP   hydrOXYzine HCL 50 mg Oral Q6H PRN Graciela Cazares MD   hydrOXYzine HCL 75 mg Oral Q6H PRN Graciela Cazares MD   lamoTRIgine 25 mg Oral HS Graciela Cazares MD   levothyroxine 50 mcg Oral Daily Yenny L Galilea, CRNP   lisinopril 20 mg Oral Daily Yenny L Galilea, CRNP   LORazepam 1 mg Intramuscular Q6H PRN Yenny L Galilea, CRNP   LORazepam 0 5 mg Oral TID Graciela Cazares MD   [START ON 11/17/2019] LORazepam 0 5 mg Oral BID Graciela Cazares MD   LORazepam 1 mg Oral Q6H PRN Graciela Cazares MD   lurasidone 60 mg Oral Daily With Seven Ugalde MD   magnesium hydroxide 30 mL Oral Daily PRN Yenny L Galilea, CRNP   metoprolol tartrate 50 mg Oral Q12H Graciela Cazares MD   multivitamin-minerals 1 tablet Oral Daily Graciela Cazares MD   nicotine 21 mg Transdermal Daily Felicia Aguayo MD   nicotine polacrilex 2 mg Oral Q4H PRN Yenny L Galilea, CRNP   OLANZapine 5 mg Intramuscular Q6H PRN Yenny L Galilea, CRNP   OLANZapine 5 mg Oral Q6H PRN Yenny L Galilea, CRNP   pantoprazole 40 mg Oral Early Morning Yenny L Galilea, CRNP   risperiDONE 1 mg Oral Q3H PRN Yenny L Galilea, CRNP   sucralfate 1 g Oral 4x Daily (AC & HS) Graciela Cazares MD   thiamine 100 mg Oral Daily Yenny L Galilea, CRNP   traZODone 150 mg Oral HS Graciela Cazares MD   traZODone 50 mg Oral HS PRN Yenny L Galilea, CRNP   venlafaxine 75 mg Oral Daily Graciela Cazares MD       Risks / Benefits of Treatment:    Risks, benefits, and possible side effects of medications explained to patient and patient verbalizes understanding and agreement for treatment      Counseling / Coordination of Care:    Patient's progress reviewed with nursing staff  Medications, treatment progress and treatment plan reviewed with patient  Medication changes discussed with patient      Maura Moncada MD 11/16/19

## 2019-11-16 NOTE — TREATMENT PLAN
TREATMENT PLAN REVIEW - [address] 48 y o  1965 female MRN: [id number]    [address] [address] Room / Bed: [address] [address]/[facility] 343-01 Encounter: [id number]        Admit Date/Time:  11/14/2019 11:36 AM    Treatment Team: Attending Provider: Manjit Hughes MD; Consulting Physician: Juan Ignacio MD; : YAMILETH Traylor; : Brittney Singh RN; Patient Care Technician: Zoya Kerr; Registered Nurse: Juliette Wilhelm RN; Patient Care Technician: Jack Hu; Patient Care Assistant: Justice Hayden; Patient Care Assistant: Delmy Plata; Patient Care Technician: Verito Noel;  Registered Nurse: Rob Joseph RN    Diagnosis: Principal Problem:    Bipolar I disorder, most recent episode depressed, severe without psychotic features ([facility] )  Active Problems:    Post-traumatic stress disorder, chronic    Generalized anxiety disorder    Uncomplicated alcohol dependence ([facility] 75 )    Essential hypertension    Acquired hypothyroidism    Gastritis    Hypercholesteremia    Tobacco abuse    Transaminitis    Patient Strengths/Assets: negotiates basic needs, patient is on a voluntary commitment, stable housing, supportive father      Patient Barriers/Limitations: chronic mental illness, difficulty adapting, relationship issues, substance abuse    Short Term Goals: decrease in depressive symptoms, decrease in anxiety symptoms, decrease in suicidal thoughts, mood stabilization    Long Term Goals: improvement in anxiety, resolution of depressive symptoms, stabilization of mood, free of suicidal thoughts, adequate sleep, adequate appetite    Progress Towards Goals: restarting psychiatric medications as prescribed, starting alcohol detoxification protocol    Recommended Treatment: medication management, patient medication education, group therapy, milieu therapy, continued Behavioral Health psychiatric evaluation/assessment process     Treatment Frequency: daily medication monitoring, group and milieu therapy daily, monitoring through interdisciplinary rounds, monitoring through weekly patient care conferences    Expected Discharge Date: 7 days - 11/22/2019    Discharge Plan: referral for outpatient medication management with a psychiatrist, referral for outpatient psychotherapy, return to previous living arrangement    Treatment Plan Created/Updated By: Shavon Gongora MD

## 2019-11-16 NOTE — PROGRESS NOTES
Patient reports 3/10 depression  Patient states her depression is less but while patient was discussing her depression she became tearful  Patient has blunted affect and does not socialize with peers  Patient out for meals, seclusive to self  Patient is medication compliant  Patient denies SI/HI and denies A/V hallucinations

## 2019-11-17 LAB
ATRIAL RATE: 65 BPM
P AXIS: 45 DEGREES
PR INTERVAL: 196 MS
QRS AXIS: 46 DEGREES
QRSD INTERVAL: 66 MS
QT INTERVAL: 412 MS
QTC INTERVAL: 428 MS
T WAVE AXIS: 25 DEGREES
VENTRICULAR RATE: 65 BPM

## 2019-11-17 PROCEDURE — 93010 ELECTROCARDIOGRAM REPORT: CPT | Performed by: INTERNAL MEDICINE

## 2019-11-17 PROCEDURE — 99232 SBSQ HOSP IP/OBS MODERATE 35: CPT | Performed by: PSYCHIATRY & NEUROLOGY

## 2019-11-17 RX ORDER — VENLAFAXINE HYDROCHLORIDE 150 MG/1
150 CAPSULE, EXTENDED RELEASE ORAL DAILY
Status: DISCONTINUED | OUTPATIENT
Start: 2019-11-18 | End: 2019-11-20 | Stop reason: HOSPADM

## 2019-11-17 RX ADMIN — Medication 1 TABLET: at 09:26

## 2019-11-17 RX ADMIN — AMLODIPINE BESYLATE 5 MG: 5 TABLET ORAL at 09:26

## 2019-11-17 RX ADMIN — GABAPENTIN 400 MG: 400 CAPSULE ORAL at 21:10

## 2019-11-17 RX ADMIN — LORAZEPAM 0.5 MG: 0.5 TABLET ORAL at 17:29

## 2019-11-17 RX ADMIN — TRAZODONE HYDROCHLORIDE 150 MG: 100 TABLET ORAL at 21:10

## 2019-11-17 RX ADMIN — FOLIC ACID 1 MG: 1 TABLET ORAL at 09:26

## 2019-11-17 RX ADMIN — SUCRALFATE 1 G: 1 TABLET ORAL at 06:02

## 2019-11-17 RX ADMIN — LEVOTHYROXINE SODIUM 50 MCG: 50 TABLET ORAL at 05:50

## 2019-11-17 RX ADMIN — CLONIDINE HYDROCHLORIDE 0.1 MG: 0.1 TABLET ORAL at 09:25

## 2019-11-17 RX ADMIN — GABAPENTIN 400 MG: 400 CAPSULE ORAL at 09:25

## 2019-11-17 RX ADMIN — LORAZEPAM 0.5 MG: 0.5 TABLET ORAL at 09:26

## 2019-11-17 RX ADMIN — LAMOTRIGINE 25 MG: 25 TABLET ORAL at 21:10

## 2019-11-17 RX ADMIN — NICOTINE POLACRILEX 2 MG: 2 GUM, CHEWING ORAL at 09:32

## 2019-11-17 RX ADMIN — NICOTINE POLACRILEX 2 MG: 2 GUM, CHEWING ORAL at 11:34

## 2019-11-17 RX ADMIN — ATORVASTATIN CALCIUM 40 MG: 40 TABLET, FILM COATED ORAL at 17:28

## 2019-11-17 RX ADMIN — NICOTINE POLACRILEX 2 MG: 2 GUM, CHEWING ORAL at 17:42

## 2019-11-17 RX ADMIN — PANTOPRAZOLE SODIUM 40 MG: 40 TABLET, DELAYED RELEASE ORAL at 05:50

## 2019-11-17 RX ADMIN — SUCRALFATE 1 G: 1 TABLET ORAL at 11:32

## 2019-11-17 RX ADMIN — SUCRALFATE 1 G: 1 TABLET ORAL at 21:10

## 2019-11-17 RX ADMIN — HYDROCHLOROTHIAZIDE 25 MG: 25 TABLET ORAL at 09:25

## 2019-11-17 RX ADMIN — LURASIDONE HYDROCHLORIDE 60 MG: 40 TABLET, FILM COATED ORAL at 17:28

## 2019-11-17 RX ADMIN — METOPROLOL TARTRATE 50 MG: 25 TABLET, FILM COATED ORAL at 09:25

## 2019-11-17 RX ADMIN — VENLAFAXINE HYDROCHLORIDE 75 MG: 75 CAPSULE, EXTENDED RELEASE ORAL at 09:26

## 2019-11-17 RX ADMIN — NICOTINE 21 MG: 21 PATCH, EXTENDED RELEASE TRANSDERMAL at 09:27

## 2019-11-17 RX ADMIN — SUCRALFATE 1 G: 1 TABLET ORAL at 17:28

## 2019-11-17 RX ADMIN — THIAMINE HCL TAB 100 MG 100 MG: 100 TAB at 09:26

## 2019-11-17 RX ADMIN — LISINOPRIL 20 MG: 20 TABLET ORAL at 09:26

## 2019-11-17 RX ADMIN — GABAPENTIN 400 MG: 400 CAPSULE ORAL at 17:28

## 2019-11-17 NOTE — PROGRESS NOTES
Patient denies SI  She appears to be sad and depressed  She reports depression but is currently unable to rate it  She states "At home I was taking more Gabapentin  I think I am coming off the Gabapentin  My energy is low " Pt states she will pass on these concerns to the physician tomorrow  She has been seclusive to her room, remaining in bed  She is cooperative and calm  Will monitor

## 2019-11-17 NOTE — PROGRESS NOTES
Patient reports "No depression "  Patient looks depressed/sad, is seclusive to herself/room and does not socialize with peers  Patient out for meals  Patient is medication compliant  Patient denies SI/HI and denies A/V hallucinations

## 2019-11-17 NOTE — PROGRESS NOTES
Progress Note - Leticia Rosario 1397 48 y o  female MRN: [id number]   Unit/Bed#: [facility] 343-01 Encounter: [id number]    Behavior over the last 24 hours: limited improvement  Jessi Doll states she feels less depressed and less anxious, but still has blunted affect and psychomotor retardation  She denies suicidal thoughts today  Compliant with medications  Attends only some groups - per staff report does not socialize with peers and stays in her room  Sleep: slept better  Appetite: normal  Medication side effects: No   ROS: no complaints, denies any shortness of breath, chest pain or abdominal pain, all other systems are negative    Mental Status Evaluation:    Appearance:  wearing hospital clothes   Behavior:  cooperative, limited eye contact   Speech:  scant, soft   Mood:  less anxious, not as depressed   Affect:  blunted   Thought Process:  organized, concrete   Associations: concrete associations   Thought Content:  no overt delusions   Perceptual Disturbances: no auditory hallucinations, no visual hallucinations   Risk Potential: Suicidal ideation - None at present  Homicidal ideation - None  Potential for aggression - No   Sensorium:  oriented to person, place and time/date   Memory:  recent and remote memory grossly intact   Consciousness:  alert and awake   Attention: decreased concentration and decreased attention span   Insight:  impaired   Judgment: impaired   Gait/Station: normal gait/station, normal balance   Motor Activity: no abnormal movements     Vital signs in last 24 hours:    Temp:  [98 °F (36 7 °C)-98 2 °F (36 8 °C)] 98 2 °F (36 8 °C)  HR:  [80-84] 83  Resp:  [16] 16  BP: ()/(71-80) 132/80    Laboratory results: I have personally reviewed all pertinent laboratory/tests results      Suicide/Homicide Risk Assessment:    Risk of Harm to Self:   Protective Factors: no current suicidal ideation, ability to communicate with staff on the unit, taking medications as ordered on the unit  Based on today's assessment, Johannah Kocher presents the following risk of harm to self: moderate    Risk of Harm to Others:  Based on today's assessment, Johannah Kocher presents the following risk of harm to others: none    The following interventions are recommended: behavioral checks every 7 minutes, continued hospitalization on locked unit    Progress Toward Goals: some progress, less anxious, not as depressed, poor motivation, denies current suicidal thoughts    Assessment/Plan   Principal Problem:    Bipolar I disorder, most recent episode depressed, severe without psychotic features ([facility] 75 )  Active Problems:    Post-traumatic stress disorder, chronic    Generalized anxiety disorder    Uncomplicated alcohol dependence ([facility] 75 )    Essential hypertension    Acquired hypothyroidism    Gastritis    Hypercholesteremia    Tobacco abuse    Transaminitis    Recommended Treatment:     Planned medication and treatment changes:     All current active medications have been reviewed  Encourage group therapy, milieu therapy and occupational therapy  Behavioral Health checks every 7 minutes  Signed 72 hour notice  Will observe if safe for discharge once 72 hour notice expires   She refuses inpatient D&A rehab  Increase Effexor XR to 150 mg daily to help with depressive symptoms  Decrease Ativan to 0 5 mg bid and taper off      Continue all other medications:    Current Facility-Administered Medications:  acetaminophen 650 mg Oral Q6H PRN Yenny L Galilea, CRNP   acetaminophen 650 mg Oral Q4H PRN Yenny L Galilea, CRNP   acetaminophen 975 mg Oral Q6H PRN Yenny L Galilea, CRNP   aluminum-magnesium hydroxide-simethicone 30 mL Oral Q4H PRN Yenny L Galilea, CRNP   amLODIPine 5 mg Oral Daily Yenny L Galilea, CRNP   atorvastatin 40 mg Oral Daily With Dinner Yenny L Galilea, CRNP   benztropine 1 mg Intramuscular Q6H PRN Yenny L Galilea, CRNP   benztropine 1 mg Oral Q6H PRN Yenny L Galilea, CRNP   cloNIDine 0 1 mg Oral BID Radha Rodríguez MD   folic acid 1 mg Oral Daily Yenny L Galilea, CRNP   gabapentin 400 mg Oral TID Yenny L Galilea, CRNP   haloperidol 5 mg Oral Q6H PRN Yenny L Galilea, CRNP   haloperidol lactate 5 mg Intramuscular Q6H PRN Yenny L Galilea, CRNP   hydrochlorothiazide 25 mg Oral Daily Radha Rodríguez MD   hydrOXYzine HCL 25 mg Oral Q6H PRN Yenny L Galilea, CRNP   hydrOXYzine HCL 50 mg Oral Q6H PRN Radha Rodríguez MD   hydrOXYzine HCL 75 mg Oral Q6H PRN Radha Rodríguez MD   lamoTRIgine 25 mg Oral HS Radha Rodríguez MD   levothyroxine 50 mcg Oral Daily Yenny L Galilea, CRSLOAN   lisinopril 20 mg Oral Daily Yenny L Galilea, CRNP   LORazepam 1 mg Intramuscular Q6H PRN Yenny L Galilea, CRSLOAN   LORazepam 0 5 mg Oral BID Radha Rodríguez MD   LORazepam 1 mg Oral Q6H PRN Radha Rodríguez MD   lurasidone 60 mg Oral Daily With Alma Delia Posada MD   magnesium hydroxide 30 mL Oral Daily PRN Yenny L Galilea, CRSLOAN   metoprolol tartrate 50 mg Oral Q12H Radha Rodríguez MD   multivitamin-minerals 1 tablet Oral Daily Radha Rodríguez MD   nicotine 21 mg Transdermal Daily Ernesto Krishna MD   nicotine polacrilex 2 mg Oral Q4H PRN Yenny L Galilea, CRNP   OLANZapine 5 mg Intramuscular Q6H PRN Yenny L Galilea, CRNP   OLANZapine 5 mg Oral Q6H PRN Yenny L Galilea, CRNP   pantoprazole 40 mg Oral Early Morning Yenny L Galilea, CRNP   risperiDONE 1 mg Oral Q3H PRN Yenny L Galilea, CRNP   sucralfate 1 g Oral 4x Daily (AC & HS) Radha Rodríguez MD   thiamine 100 mg Oral Daily Yenny ELSA CagleGalilea, CRSLOAN   traZODone 150 mg Oral HS Radha Rodríguez MD   traZODone 50 mg Oral HS PRN Yenny L Galilea, CRNP   [START ON 11/18/2019] venlafaxine 150 mg Oral Daily Radha Rodríguez MD       Risks / Benefits of Treatment:    Risks, benefits, and possible side effects of medications explained to patient   Patient has limited understanding of risks and benefits of treatment at this time, but agrees to take medications as prescribed  Counseling / Coordination of Care:    Patient's progress reviewed with nursing staff  Medications, treatment progress and treatment plan reviewed with patient  Medication changes discussed with patient      Florencio Yuen MD 11/17/19

## 2019-11-17 NOTE — PLAN OF CARE
Problem: SELF HARM/SUICIDALITY  Goal: Will have no self-injury during hospital stay  Description  INTERVENTIONS:  - Q 7 MINUTES: Routine safety checks  - Q WAKING SHIFT & PRN: Assess risk to determine if routine checks are adequate to maintain patient safety  - Encourage patient to participate actively in care by formulating a plan to combat response to suicidal ideation, identify supports and resources   Outcome: Progressing     Problem: DEPRESSION  Goal: Will be euthymic at discharge  Description  INTERVENTIONS:  - Administer medication as ordered  - Provide emotional support via 1:1 interaction with staff  - Encourage involvement in milieu/groups/activities  - Monitor for social isolation  Outcome: Progressing     Problem: ANXIETY  Goal: Will report anxiety at manageable levels  Description  INTERVENTIONS:  - Administer medication as ordered  - Teach and encourage coping skills  - Provide emotional support  - Assess patient/family for anxiety and ability to cope  Outcome: Progressing  Goal: By discharge: Patient will verbalize 2 strategies to deal with anxiety  Description  Interventions:  - Identify any obvious source/trigger to anxiety  - Staff will assist patient in applying identified coping technique/skills  - Encourage attendance of scheduled groups and activities  Outcome: Progressing     Problem: SUBSTANCE USE/ABUSE  Goal: Will have no detox symptoms and will verbalize plan for changing substance-related behavior  Description  INTERVENTIONS:  - Monitor physical status and assess for symptoms of withdrawal  - Administer medication as ordered  - Provide emotional support with 1 on 1 interaction with staff  - Encourage recovery focused program/ addiction education  - Assess for verbalization of changing behaviors related to substance abuse  - Initiate consults and referrals as appropriate (Case Management, Spiritual Care, etc )  Outcome: Progressing  Goal: By discharge, will develop insight into their chemical dependency and sustain motivation to continue in recovery  Description  INTERVENTIONS:  - Attends all daily group sessions and scheduled AA groups  - Actively practices coping skills through participation in the therapeutic community and adherence to program rules  - Reviews and completes assignments from individual treatment plan  - Assist patient development of understanding of their personal cycle of addiction and relapse triggers  Outcome: Progressing  Goal: By discharge, patient will have ongoing treatment plan addressing chemical dependency  Description  INTERVENTIONS:  - Assist patient with resources and/or appointments for ongoing recovery based living  Outcome: Progressing     Problem: SELF CARE DEFICIT  Goal: Return ADL status to a safe level of function  Description  INTERVENTIONS:  - Administer medication as ordered  - Assess ADL deficits and provide assistive devices as needed  - Obtain PT/OT consults as needed  - Assist and instruct patient to increase activity and self care as tolerated  Outcome: Progressing     Problem: DISCHARGE PLANNING  Goal: Discharge to home or other facility with appropriate resources  Description  INTERVENTIONS:  - Identify barriers to discharge w/patient and caregiver  - Arrange for needed discharge resources and transportation as appropriate  - Identify discharge learning needs (meds, wound care, etc )  - Arrange for interpretive services to assist at discharge as needed  - Refer to Case Management Department for coordinating discharge planning if the patient needs post-hospital services based on physician/advanced practitioner order or complex needs related to functional status, cognitive ability, or social support system  Outcome: Progressing     Problem: Ineffective Coping  Goal: Participates in unit activities  Description  Interventions:  - Provide therapeutic environment   - Provide required programming   - Redirect inappropriate behaviors   Outcome: Progressing

## 2019-11-18 LAB
1,25(OH)2D3 SERPL-MCNC: 36.2 PG/ML (ref 19.9–79.3)
ALBUMIN SERPL BCP-MCNC: 3.6 G/DL (ref 3–5.2)
ALP SERPL-CCNC: 52 U/L (ref 43–122)
ALT SERPL W P-5'-P-CCNC: 48 U/L (ref 9–52)
ANION GAP SERPL CALCULATED.3IONS-SCNC: 5 MMOL/L (ref 5–14)
AST SERPL W P-5'-P-CCNC: 28 U/L (ref 14–36)
BILIRUB SERPL-MCNC: 0.7 MG/DL
BUN SERPL-MCNC: 15 MG/DL (ref 5–25)
CALCIUM SERPL-MCNC: 9 MG/DL (ref 8.4–10.2)
CHLORIDE SERPL-SCNC: 96 MMOL/L (ref 97–108)
CO2 SERPL-SCNC: 35 MMOL/L (ref 22–30)
CREAT SERPL-MCNC: 0.77 MG/DL (ref 0.6–1.2)
GFR SERPL CREATININE-BSD FRML MDRD: 89 ML/MIN/1.73SQ M
GLUCOSE P FAST SERPL-MCNC: 131 MG/DL (ref 70–99)
GLUCOSE SERPL-MCNC: 131 MG/DL (ref 70–99)
POTASSIUM SERPL-SCNC: 3.7 MMOL/L (ref 3.6–5)
PROT SERPL-MCNC: 6.5 G/DL (ref 5.9–8.4)
SODIUM SERPL-SCNC: 136 MMOL/L (ref 137–147)

## 2019-11-18 PROCEDURE — 80053 COMPREHEN METABOLIC PANEL: CPT | Performed by: PSYCHIATRY & NEUROLOGY

## 2019-11-18 PROCEDURE — 99232 SBSQ HOSP IP/OBS MODERATE 35: CPT | Performed by: NURSE PRACTITIONER

## 2019-11-18 RX ORDER — LANOLIN ALCOHOL/MO/W.PET/CERES
3 CREAM (GRAM) TOPICAL
Status: DISCONTINUED | OUTPATIENT
Start: 2019-11-18 | End: 2019-11-20 | Stop reason: HOSPADM

## 2019-11-18 RX ADMIN — Medication 1 TABLET: at 08:24

## 2019-11-18 RX ADMIN — PANTOPRAZOLE SODIUM 40 MG: 40 TABLET, DELAYED RELEASE ORAL at 06:03

## 2019-11-18 RX ADMIN — CLONIDINE HYDROCHLORIDE 0.1 MG: 0.1 TABLET ORAL at 16:52

## 2019-11-18 RX ADMIN — GABAPENTIN 400 MG: 400 CAPSULE ORAL at 21:28

## 2019-11-18 RX ADMIN — SUCRALFATE 1 G: 1 TABLET ORAL at 11:02

## 2019-11-18 RX ADMIN — SUCRALFATE 1 G: 1 TABLET ORAL at 21:29

## 2019-11-18 RX ADMIN — LORAZEPAM 0.5 MG: 0.5 TABLET ORAL at 08:25

## 2019-11-18 RX ADMIN — VENLAFAXINE HYDROCHLORIDE 150 MG: 150 CAPSULE, EXTENDED RELEASE ORAL at 08:28

## 2019-11-18 RX ADMIN — NICOTINE POLACRILEX 2 MG: 2 GUM, CHEWING ORAL at 13:19

## 2019-11-18 RX ADMIN — NICOTINE POLACRILEX 2 MG: 2 GUM, CHEWING ORAL at 18:15

## 2019-11-18 RX ADMIN — GABAPENTIN 400 MG: 400 CAPSULE ORAL at 08:24

## 2019-11-18 RX ADMIN — ATORVASTATIN CALCIUM 40 MG: 40 TABLET, FILM COATED ORAL at 17:10

## 2019-11-18 RX ADMIN — GABAPENTIN 400 MG: 400 CAPSULE ORAL at 17:00

## 2019-11-18 RX ADMIN — LISINOPRIL 20 MG: 20 TABLET ORAL at 08:25

## 2019-11-18 RX ADMIN — LAMOTRIGINE 25 MG: 25 TABLET ORAL at 21:28

## 2019-11-18 RX ADMIN — SUCRALFATE 1 G: 1 TABLET ORAL at 17:00

## 2019-11-18 RX ADMIN — MELATONIN TAB 3 MG 3 MG: 3 TAB at 21:30

## 2019-11-18 RX ADMIN — AMLODIPINE BESYLATE 5 MG: 5 TABLET ORAL at 08:24

## 2019-11-18 RX ADMIN — LEVOTHYROXINE SODIUM 50 MCG: 50 TABLET ORAL at 06:03

## 2019-11-18 RX ADMIN — TRAZODONE HYDROCHLORIDE 150 MG: 100 TABLET ORAL at 21:28

## 2019-11-18 RX ADMIN — NICOTINE POLACRILEX 2 MG: 2 GUM, CHEWING ORAL at 09:52

## 2019-11-18 RX ADMIN — SUCRALFATE 1 G: 1 TABLET ORAL at 06:03

## 2019-11-18 RX ADMIN — HYDROCHLOROTHIAZIDE 25 MG: 25 TABLET ORAL at 08:25

## 2019-11-18 RX ADMIN — THIAMINE HCL TAB 100 MG 100 MG: 100 TAB at 08:25

## 2019-11-18 RX ADMIN — METOPROLOL TARTRATE 50 MG: 25 TABLET, FILM COATED ORAL at 08:25

## 2019-11-18 RX ADMIN — LURASIDONE HYDROCHLORIDE 80 MG: 80 TABLET, FILM COATED ORAL at 17:10

## 2019-11-18 RX ADMIN — FOLIC ACID 1 MG: 1 TABLET ORAL at 08:24

## 2019-11-18 RX ADMIN — CLONIDINE HYDROCHLORIDE 0.1 MG: 0.1 TABLET ORAL at 08:25

## 2019-11-18 RX ADMIN — NICOTINE 21 MG: 21 PATCH, EXTENDED RELEASE TRANSDERMAL at 08:26

## 2019-11-18 NOTE — PROGRESS NOTES
11/18/19 0700   Team Meeting   Meeting Type Daily Rounds   Team Members Present   Team Members Present Physician;Nurse;; Other (Discipline and Name)   Physician Team Member [address] Team Member [facility]   Social Work Team Member Gloria Laurent   Other (Discipline and Name) Sherry Campbell- NP; Sarita Ford NP   Patient/Family Present   Patient Present No   Patient's Family Present No     Request pt to rescind 72-hour notice, medication management and monitoring

## 2019-11-18 NOTE — CASE MANAGEMENT
LENORA appointment made 12/5/2019 12PM soonest available  SW called an requested update on progress of BCM services requested from [facility] mental health  Benny Dunlap has been trying to reach Pt  SW left message for Roberta Fitting to please return call

## 2019-11-18 NOTE — PROGRESS NOTES
Pt denies SI/HI presently  She rescinded her 72 hr notice today and agreed to stay her for medication management and monitoring  She has been visible and social in the milieu  She is saying that her depression and anxiety are better   She stated she did not sleep well last night because her room mate was up all night and this disturbed her

## 2019-11-18 NOTE — PROGRESS NOTES
Pt denies SI  She denies any current depression  Pt affect appears to have improved; she was smiling, looked brighter during interaction with RN  Pt remains seclusive to self/room  She states she is "feeling good"

## 2019-11-18 NOTE — PROGRESS NOTES
Progress Note - Behavioral Health   Jazmin Feliz 48 y o  female MRN: [id number]  Unit/Bed#: [facility] 345-01 Encounter: [id number]    Assessment/Plan   Principal Problem:    Bipolar I disorder, most recent episode depressed, severe without psychotic features ([facility] 75 )  Active Problems:    Essential hypertension    Acquired hypothyroidism    Gastritis    Uncomplicated alcohol dependence ([facility] )    Post-traumatic stress disorder, chronic    Hypercholesteremia    Tobacco abuse    Generalized anxiety disorder    Transaminitis      Subjective:Patient was seen today for continuation of care, records reviewed and  patient was discussed with the morning case review team   Patient presented a 72 hours notice  She reported feeling better however she is unable to sleep very well has been is sleeping 4-5 hours  Reports having good appetite  Has been attending groups  She looks forward to go home as she has been worried about her pets and her sons (30,26 and 7 y/o)  She reports still feeling a little depressed and anxious however mood has been improved, psychomotor retardation noted  Presented calmer, cooperative and pleasant  Patient reported that she has been suffering from depression and anhedonia symptoms for a long time  The patient reported that she has not been able to smile in a long time  Patient denies endorsing any suicidal or homicidal ideation  Does not seem to be experiencing any manic or psychotic symptoms during our encounter  Remains medication compliance  Denies any side effects from medications  Will increase Latuda from 60-80 mg daily with dinner for depressive symptoms and Melatonin 3 mg daily at bedtime      Current Medications:  Current Facility-Administered Medications   Medication Dose Route Frequency    acetaminophen (TYLENOL) tablet 650 mg  650 mg Oral Q6H PRN    acetaminophen (TYLENOL) tablet 650 mg  650 mg Oral Q4H PRN    acetaminophen (TYLENOL) tablet 975 mg  975 mg Oral Q6H PRN    aluminum-magnesium hydroxide-simethicone (MYLANTA) 200-200-20 mg/5 mL oral suspension 30 mL  30 mL Oral Q4H PRN    amLODIPine (NORVASC) tablet 5 mg  5 mg Oral Daily    atorvastatin (LIPITOR) tablet 40 mg  40 mg Oral Daily With Dinner    benztropine (COGENTIN) injection 1 mg  1 mg Intramuscular Q6H PRN    benztropine (COGENTIN) tablet 1 mg  1 mg Oral Q6H PRN    cloNIDine (CATAPRES) tablet 0 1 mg  0 1 mg Oral BID    folic acid (FOLVITE) tablet 1 mg  1 mg Oral Daily    gabapentin (NEURONTIN) capsule 400 mg  400 mg Oral TID    haloperidol (HALDOL) tablet 5 mg  5 mg Oral Q6H PRN    haloperidol lactate (HALDOL) injection 5 mg  5 mg Intramuscular Q6H PRN    hydrochlorothiazide (HYDRODIURIL) tablet 25 mg  25 mg Oral Daily    hydrOXYzine HCL (ATARAX) tablet 25 mg  25 mg Oral Q6H PRN    hydrOXYzine HCL (ATARAX) tablet 50 mg  50 mg Oral Q6H PRN    hydrOXYzine HCL (ATARAX) tablet 75 mg  75 mg Oral Q6H PRN    lamoTRIgine (LaMICtal) tablet 25 mg  25 mg Oral HS    levothyroxine tablet 50 mcg  50 mcg Oral Daily    lisinopril (ZESTRIL) tablet 20 mg  20 mg Oral Daily    LORazepam (ATIVAN) 2 mg/mL injection 1 mg  1 mg Intramuscular Q6H PRN    LORazepam (ATIVAN) tablet 1 mg  1 mg Oral Q6H PRN    lurasidone (LATUDA) tablet 60 mg  60 mg Oral Daily With Dinner    magnesium hydroxide (MILK OF MAGNESIA) 400 mg/5 mL oral suspension 30 mL  30 mL Oral Daily PRN    metoprolol tartrate (LOPRESSOR) tablet 50 mg  50 mg Oral Q12H    multivitamin-minerals (CENTRUM) tablet 1 tablet  1 tablet Oral Daily    nicotine (NICODERM CQ) 21 mg/24 hr TD 24 hr patch 21 mg  21 mg Transdermal Daily    nicotine polacrilex (NICORETTE) gum 2 mg  2 mg Oral Q4H PRN    OLANZapine (ZyPREXA) IM injection 5 mg  5 mg Intramuscular Q6H PRN    OLANZapine (ZyPREXA) tablet 5 mg  5 mg Oral Q6H PRN    pantoprazole (PROTONIX) EC tablet 40 mg  40 mg Oral Early Morning    risperiDONE (RisperDAL M-TABS) dispersible tablet 1 mg  1 mg Oral Q3H PRN    sucralfate (CARAFATE) tablet 1 g  1 g Oral 4x Daily (AC & HS)    thiamine (VITAMIN B1) tablet 100 mg  100 mg Oral Daily    traZODone (DESYREL) tablet 150 mg  150 mg Oral HS    traZODone (DESYREL) tablet 50 mg  50 mg Oral HS PRN    venlafaxine (EFFEXOR-XR) 24 hr capsule 150 mg  150 mg Oral Daily       Behavioral Health Medications: all current active meds have been reviewed, continue current psychiatric medications and planned medication changes: Increase Latuda from 60-80 mg daily, Melatonin 3 mg daily at bedtime  Vitals:  Vitals:    11/18/19 1124   BP: 114/71   Pulse: 90   Resp:    Temp:    SpO2:        Laboratory results:    I have personally reviewed all pertinent laboratory/tests results  Most Recent Labs:   Lab Results   Component Value Date    WBC 8 70 11/15/2019    RBC 4 43 11/15/2019    HGB 14 6 11/15/2019    HCT 42 3 11/15/2019     11/15/2019    RDW 13 9 11/15/2019    NEUTROABS 4 30 11/15/2019    SODIUM 136 (L) 11/18/2019    K 3 7 11/18/2019    CL 96 (L) 11/18/2019    CO2 35 (H) 11/18/2019    BUN 15 11/18/2019    CREATININE 0 77 11/18/2019    GLUCOSE 97 01/03/2016    GLUC 131 (H) 11/18/2019    GLUF 131 (H) 11/18/2019    CALCIUM 9 0 11/18/2019    AST 28 11/18/2019    ALT 48 11/18/2019    ALKPHOS 52 11/18/2019    TP 6 5 11/18/2019    ALB 3 6 11/18/2019    TBILI 0 70 11/18/2019    CHOLESTEROL 97 11/15/2019    HDL 62 11/15/2019    TRIG 157 (H) 11/15/2019    LDLCALC 4 11/15/2019    NONHDLC 35 11/15/2019    [id number] 2 170 11/15/2019    FREET4 0 87 05/26/2019    PREGSERUM Negative 11/15/2019    RPR Non-Reactive 11/15/2019    HGBA1C 5 4 11/15/2019     11/15/2019       Psychiatric Review of Systems:    Behavior over the last 24 hours:  improved     Sleep:  4-5 hours  Appetite: normal  Medication side effects: No  ROS: no complaints    Mental Status Evaluation:    Appearance:  casually dressed, overweight, wearing a blanket around her shoulders   Behavior:  cooperative, calm, good eye contact, psychomotor retardation   Speech:  fluent, decreased rate, slow, soft, decreased volume   Mood:  less anxious, less depressed   Affect:  blunted   Thought Process:  organized, goal directed, linear   Associations: concrete associations, thought blocking   Thought Content:  no overt delusions   Perceptual Disturbances: no auditory hallucinations, no visual hallucinations   Risk Potential: Suicidal ideation - None  Homicidal ideation - None  Potential for aggression - No   Sensorium:  oriented to person, place and time/date   Memory:  recent and remote memory grossly intact   Consciousness:  alert and awake   Attention: decreased concentration and decreased attention span   Insight:  improving   Judgment: improving   Gait/Station: normal gait/station, normal balance   Motor Activity: no abnormal movements       Progress Toward Goals:     Improving     Assessment/Plan   Principal Problem:    Bipolar I disorder, most recent episode depressed, severe without psychotic features (HCC)  Active Problems:    Essential hypertension    Acquired hypothyroidism    Gastritis    Uncomplicated alcohol dependence (HCC)    Post-traumatic stress disorder, chronic    Hypercholesteremia    Tobacco abuse    Generalized anxiety disorder    Transaminitis      Recommended Treatment: Continue with group therapy, milieu therapy and occupational therapy  Treatment plan and medication changes discussed and per the attending physician the plan is: 1  Behavioral Health checks every 7 minutes  2  Continue with current medication regimen:  Lamictal 25 mg daily at bedtime, clonidine 0 1 mg b i d  Daily, Neurontin 400 mg t i d  Daily, Effexor 150 mg daily in the morning, Latuda will be increased from 60-80 mg daily with dinner  Start Melatonin 3 mg daily at bedtime  Continue with medical medications as ordered  3  Disposition planning possible for Wednesday        Risks / Benefits of Treatment:     Risks, benefits, and possible side effects of medications explained to patient and patient verbalizes understanding and agreement for treatment  Counseling / Coordination of Care:     Patient's progress reviewed with nursing staff  Medications, treatment progress and treatment plan reviewed with patient  Supportive counseling provided to the patient            Aubree Vaca

## 2019-11-18 NOTE — PLAN OF CARE
Problem: DEPRESSION  Goal: Will be euthymic at discharge  Description  INTERVENTIONS:  - Administer medication as ordered  - Provide emotional support via 1:1 interaction with staff  - Encourage involvement in milieu/groups/activities  - Monitor for social isolation  Outcome: Progressing     Problem: ANXIETY  Goal: Will report anxiety at manageable levels  Description  INTERVENTIONS:  - Administer medication as ordered  - Teach and encourage coping skills  - Provide emotional support  - Assess patient/family for anxiety and ability to cope  Outcome: Progressing  Goal: By discharge: Patient will verbalize 2 strategies to deal with anxiety  Description  Interventions:  - Identify any obvious source/trigger to anxiety  - Staff will assist patient in applying identified coping technique/skills  - Encourage attendance of scheduled groups and activities  Outcome: Progressing     Problem: SUBSTANCE USE/ABUSE  Goal: By discharge, will develop insight into their chemical dependency and sustain motivation to continue in recovery  Description  INTERVENTIONS:  - Attends all daily group sessions and scheduled AA groups  - Actively practices coping skills through participation in the therapeutic community and adherence to program rules  - Reviews and completes assignments from individual treatment plan  - Assist patient development of understanding of their personal cycle of addiction and relapse triggers  Outcome: Progressing  Goal: By discharge, patient will have ongoing treatment plan addressing chemical dependency  Description  INTERVENTIONS:  - Assist patient with resources and/or appointments for ongoing recovery based living  Outcome: Progressing     Problem: SELF CARE DEFICIT  Goal: Return ADL status to a safe level of function  Description  INTERVENTIONS:  - Administer medication as ordered  - Assess ADL deficits and provide assistive devices as needed  - Obtain PT/OT consults as needed  - Assist and instruct patient to increase activity and self care as tolerated  Outcome: Progressing     Problem: DISCHARGE PLANNING  Goal: Discharge to home or other facility with appropriate resources  Description  INTERVENTIONS:  - Identify barriers to discharge w/patient and caregiver  - Arrange for needed discharge resources and transportation as appropriate  - Identify discharge learning needs (meds, wound care, etc )  - Arrange for interpretive services to assist at discharge as needed  - Refer to Case Management Department for coordinating discharge planning if the patient needs post-hospital services based on physician/advanced practitioner order or complex needs related to functional status, cognitive ability, or social support system  Outcome: Progressing     Problem: Ineffective Coping  Goal: Participates in unit activities  Description  Interventions:  - Provide therapeutic environment   - Provide required programming   - Redirect inappropriate behaviors   Outcome: Progressing

## 2019-11-18 NOTE — PROGRESS NOTES
[facility] NOTE     11/18/19 1000   Activity/Group Checklist   Group Admission/Discharge  (Completed Adm Self Assessment, DERs)   Attendance Attended   Attendance Duration (min) 16-30   Interactions Interacted appropriately   Affect/Mood Appropriate   Goals Achieved Able to listen to others; Able to engage in interactions

## 2019-11-19 PROCEDURE — 99231 SBSQ HOSP IP/OBS SF/LOW 25: CPT | Performed by: NURSE PRACTITIONER

## 2019-11-19 RX ORDER — ATORVASTATIN CALCIUM 40 MG/1
40 TABLET, FILM COATED ORAL
Qty: 30 TABLET | Refills: 0 | Status: SHIPPED | OUTPATIENT
Start: 2019-11-19 | End: 2019-12-19 | Stop reason: HOSPADM

## 2019-11-19 RX ORDER — CLONIDINE HYDROCHLORIDE 0.1 MG/1
0.1 TABLET ORAL 2 TIMES DAILY
Qty: 60 TABLET | Refills: 1 | Status: ON HOLD | OUTPATIENT
Start: 2019-11-19 | End: 2020-02-17 | Stop reason: SDUPTHER

## 2019-11-19 RX ORDER — LANOLIN ALCOHOL/MO/W.PET/CERES
3 CREAM (GRAM) TOPICAL
Qty: 30 TABLET | Refills: 1 | Status: SHIPPED | OUTPATIENT
Start: 2019-11-19 | End: 2019-12-19 | Stop reason: HOSPADM

## 2019-11-19 RX ORDER — SUCRALFATE 1 G/1
1 TABLET ORAL
Qty: 120 TABLET | Refills: 1 | Status: SHIPPED | OUTPATIENT
Start: 2019-11-19 | End: 2020-02-17 | Stop reason: HOSPADM

## 2019-11-19 RX ORDER — FOLIC ACID 1 MG/1
1 TABLET ORAL DAILY
Qty: 30 TABLET | Refills: 1 | Status: ON HOLD | OUTPATIENT
Start: 2019-11-19 | End: 2020-02-17 | Stop reason: SDUPTHER

## 2019-11-19 RX ORDER — LEVOTHYROXINE SODIUM 0.05 MG/1
50 TABLET ORAL DAILY
Qty: 30 TABLET | Refills: 0 | Status: SHIPPED | OUTPATIENT
Start: 2019-11-19 | End: 2020-02-17 | Stop reason: HOSPADM

## 2019-11-19 RX ORDER — AMLODIPINE BESYLATE 5 MG/1
5 TABLET ORAL DAILY
Qty: 30 TABLET | Refills: 0 | Status: SHIPPED | OUTPATIENT
Start: 2019-11-19 | End: 2019-12-19 | Stop reason: HOSPADM

## 2019-11-19 RX ORDER — PANTOPRAZOLE SODIUM 40 MG/1
40 TABLET, DELAYED RELEASE ORAL
Qty: 30 TABLET | Refills: 0 | Status: ON HOLD | OUTPATIENT
Start: 2019-11-19 | End: 2020-02-17 | Stop reason: SDUPTHER

## 2019-11-19 RX ORDER — GABAPENTIN 400 MG/1
400 CAPSULE ORAL 3 TIMES DAILY
Qty: 90 CAPSULE | Refills: 1 | Status: SHIPPED | OUTPATIENT
Start: 2019-11-19 | End: 2019-12-19 | Stop reason: HOSPADM

## 2019-11-19 RX ORDER — LISINOPRIL 20 MG/1
20 TABLET ORAL DAILY
Qty: 30 TABLET | Refills: 0 | Status: SHIPPED | OUTPATIENT
Start: 2019-11-19 | End: 2020-01-06

## 2019-11-19 RX ORDER — VENLAFAXINE HYDROCHLORIDE 150 MG/1
150 CAPSULE, EXTENDED RELEASE ORAL DAILY
Qty: 30 CAPSULE | Refills: 1 | Status: SHIPPED | OUTPATIENT
Start: 2019-11-20 | End: 2019-12-19 | Stop reason: HOSPADM

## 2019-11-19 RX ORDER — LANOLIN ALCOHOL/MO/W.PET/CERES
100 CREAM (GRAM) TOPICAL DAILY
Qty: 30 TABLET | Refills: 1 | Status: ON HOLD | OUTPATIENT
Start: 2019-11-19 | End: 2019-12-18 | Stop reason: SDUPTHER

## 2019-11-19 RX ORDER — HYDROCHLOROTHIAZIDE 25 MG/1
25 TABLET ORAL DAILY
Qty: 30 TABLET | Refills: 0 | Status: SHIPPED | OUTPATIENT
Start: 2019-11-19 | End: 2019-12-19 | Stop reason: HOSPADM

## 2019-11-19 RX ORDER — METOPROLOL TARTRATE 50 MG/1
50 TABLET, FILM COATED ORAL EVERY 12 HOURS
Qty: 60 TABLET | Refills: 1 | Status: ON HOLD | OUTPATIENT
Start: 2019-11-19 | End: 2020-02-17 | Stop reason: SDUPTHER

## 2019-11-19 RX ORDER — TRAZODONE HYDROCHLORIDE 150 MG/1
150 TABLET ORAL
Qty: 30 TABLET | Refills: 1 | Status: SHIPPED | OUTPATIENT
Start: 2019-11-19 | End: 2019-12-19 | Stop reason: HOSPADM

## 2019-11-19 RX ORDER — LAMOTRIGINE 25 MG/1
25 TABLET ORAL
Qty: 30 TABLET | Refills: 0 | Status: SHIPPED | OUTPATIENT
Start: 2019-11-19 | End: 2019-11-20 | Stop reason: SDUPTHER

## 2019-11-19 RX ADMIN — MELATONIN TAB 3 MG 3 MG: 3 TAB at 21:09

## 2019-11-19 RX ADMIN — SUCRALFATE 1 G: 1 TABLET ORAL at 12:10

## 2019-11-19 RX ADMIN — Medication 1 TABLET: at 08:10

## 2019-11-19 RX ADMIN — PANTOPRAZOLE SODIUM 40 MG: 40 TABLET, DELAYED RELEASE ORAL at 06:04

## 2019-11-19 RX ADMIN — SUCRALFATE 1 G: 1 TABLET ORAL at 21:06

## 2019-11-19 RX ADMIN — HYDROCHLOROTHIAZIDE 25 MG: 25 TABLET ORAL at 08:11

## 2019-11-19 RX ADMIN — LURASIDONE HYDROCHLORIDE 80 MG: 80 TABLET, FILM COATED ORAL at 17:08

## 2019-11-19 RX ADMIN — LEVOTHYROXINE SODIUM 50 MCG: 50 TABLET ORAL at 06:04

## 2019-11-19 RX ADMIN — LAMOTRIGINE 25 MG: 25 TABLET ORAL at 21:07

## 2019-11-19 RX ADMIN — TRAZODONE HYDROCHLORIDE 150 MG: 100 TABLET ORAL at 21:07

## 2019-11-19 RX ADMIN — SUCRALFATE 1 G: 1 TABLET ORAL at 15:56

## 2019-11-19 RX ADMIN — GABAPENTIN 400 MG: 400 CAPSULE ORAL at 21:06

## 2019-11-19 RX ADMIN — VENLAFAXINE HYDROCHLORIDE 150 MG: 150 CAPSULE, EXTENDED RELEASE ORAL at 08:11

## 2019-11-19 RX ADMIN — CLONIDINE HYDROCHLORIDE 0.1 MG: 0.1 TABLET ORAL at 08:10

## 2019-11-19 RX ADMIN — THIAMINE HCL TAB 100 MG 100 MG: 100 TAB at 08:11

## 2019-11-19 RX ADMIN — GABAPENTIN 400 MG: 400 CAPSULE ORAL at 15:57

## 2019-11-19 RX ADMIN — LISINOPRIL 20 MG: 20 TABLET ORAL at 08:11

## 2019-11-19 RX ADMIN — FOLIC ACID 1 MG: 1 TABLET ORAL at 08:11

## 2019-11-19 RX ADMIN — SUCRALFATE 1 G: 1 TABLET ORAL at 06:04

## 2019-11-19 RX ADMIN — METOPROLOL TARTRATE 50 MG: 25 TABLET, FILM COATED ORAL at 08:10

## 2019-11-19 RX ADMIN — NICOTINE POLACRILEX 2 MG: 2 GUM, CHEWING ORAL at 13:26

## 2019-11-19 RX ADMIN — NICOTINE POLACRILEX 2 MG: 2 GUM, CHEWING ORAL at 17:55

## 2019-11-19 RX ADMIN — NICOTINE 21 MG: 21 PATCH, EXTENDED RELEASE TRANSDERMAL at 08:11

## 2019-11-19 RX ADMIN — ATORVASTATIN CALCIUM 40 MG: 40 TABLET, FILM COATED ORAL at 15:56

## 2019-11-19 RX ADMIN — GABAPENTIN 400 MG: 400 CAPSULE ORAL at 08:11

## 2019-11-19 RX ADMIN — NICOTINE POLACRILEX 2 MG: 2 GUM, CHEWING ORAL at 10:13

## 2019-11-19 RX ADMIN — AMLODIPINE BESYLATE 5 MG: 5 TABLET ORAL at 08:11

## 2019-11-19 RX ADMIN — CLONIDINE HYDROCHLORIDE 0.1 MG: 0.1 TABLET ORAL at 17:26

## 2019-11-19 NOTE — PROGRESS NOTES
11/19/19 0700   Team Meeting   Meeting Type Daily Rounds   Team Members Present   Team Members Present Physician;Nurse;; Other (Discipline and Name)   Physician Team Member [id number] Team Member Abdoul   Social Work Team Member Jae Ortiz   Patient/Family Present   Patient Present No   Patient's Family Present No   Patient is for discharge tomorrow

## 2019-11-19 NOTE — PROGRESS NOTES
Progress Note - Behavioral Health   Jose Ramos 48 y o  female MRN: [id number]  [facility] Encounter: [id number]    Assessment/Plan   Principal Problem:    Bipolar I disorder, most recent episode depressed, severe without psychotic features ([facility] 75 )  Active Problems:    Essential hypertension    Acquired hypothyroidism    Gastritis    Uncomplicated alcohol dependence ([facility] )    Post-traumatic stress disorder, chronic    Hypercholesteremia    Tobacco abuse    Generalized anxiety disorder    Transaminitis      Subjective:Patient was seen today for continuation of care, records reviewed and  patient was discussed with the morning case review team   Severo Aurora presented calmer, brighter affect and is smiling appropriately  She is tolerating the increase of Latuda well  She looks forward to be discharged tomorrow back home but expressed interest in a partial program since she could benefit from therapy and learning coping skills in order to learn more about her emotions and interpersonal conflicts  She reported that she slept better also with Melatonin  She she reports having good appetite  Has been attending groups and participating  Patient denies endorsing any suicidal or homicidal ideation  Does not seem to be experiencing any manic or psychotic symptoms during our encounter  Remains medication compliance  Denies any side effects from medications      Current Medications:  Current Facility-Administered Medications   Medication Dose Route Frequency    acetaminophen (TYLENOL) tablet 650 mg  650 mg Oral Q6H PRN    acetaminophen (TYLENOL) tablet 650 mg  650 mg Oral Q4H PRN    acetaminophen (TYLENOL) tablet 975 mg  975 mg Oral Q6H PRN    aluminum-magnesium hydroxide-simethicone (MYLANTA) 200-200-20 mg/5 mL oral suspension 30 mL  30 mL Oral Q4H PRN    amLODIPine (NORVASC) tablet 5 mg  5 mg Oral Daily    atorvastatin (LIPITOR) tablet 40 mg  40 mg Oral Daily With Dinner    benztropine (COGENTIN) injection 1 mg  1 mg Intramuscular Q6H PRN    benztropine (COGENTIN) tablet 1 mg  1 mg Oral Q6H PRN    cloNIDine (CATAPRES) tablet 0 1 mg  0 1 mg Oral BID    folic acid (FOLVITE) tablet 1 mg  1 mg Oral Daily    gabapentin (NEURONTIN) capsule 400 mg  400 mg Oral TID    haloperidol (HALDOL) tablet 5 mg  5 mg Oral Q6H PRN    haloperidol lactate (HALDOL) injection 5 mg  5 mg Intramuscular Q6H PRN    hydrochlorothiazide (HYDRODIURIL) tablet 25 mg  25 mg Oral Daily    hydrOXYzine HCL (ATARAX) tablet 25 mg  25 mg Oral Q6H PRN    hydrOXYzine HCL (ATARAX) tablet 50 mg  50 mg Oral Q6H PRN    hydrOXYzine HCL (ATARAX) tablet 75 mg  75 mg Oral Q6H PRN    lamoTRIgine (LaMICtal) tablet 25 mg  25 mg Oral HS    levothyroxine tablet 50 mcg  50 mcg Oral Daily    lisinopril (ZESTRIL) tablet 20 mg  20 mg Oral Daily    LORazepam (ATIVAN) 2 mg/mL injection 1 mg  1 mg Intramuscular Q6H PRN    LORazepam (ATIVAN) tablet 1 mg  1 mg Oral Q6H PRN    lurasidone (LATUDA) tablet 80 mg  80 mg Oral Daily With Dinner    magnesium hydroxide (MILK OF MAGNESIA) 400 mg/5 mL oral suspension 30 mL  30 mL Oral Daily PRN    melatonin tablet 3 mg  3 mg Oral HS    metoprolol tartrate (LOPRESSOR) tablet 50 mg  50 mg Oral Q12H    multivitamin-minerals (CENTRUM) tablet 1 tablet  1 tablet Oral Daily    nicotine (NICODERM CQ) 21 mg/24 hr TD 24 hr patch 21 mg  21 mg Transdermal Daily    nicotine polacrilex (NICORETTE) gum 2 mg  2 mg Oral Q4H PRN    OLANZapine (ZyPREXA) IM injection 5 mg  5 mg Intramuscular Q6H PRN    OLANZapine (ZyPREXA) tablet 5 mg  5 mg Oral Q6H PRN    pantoprazole (PROTONIX) EC tablet 40 mg  40 mg Oral Early Morning    risperiDONE (RisperDAL M-TABS) dispersible tablet 1 mg  1 mg Oral Q3H PRN    sucralfate (CARAFATE) tablet 1 g  1 g Oral 4x Daily (AC & HS)    thiamine (VITAMIN B1) tablet 100 mg  100 mg Oral Daily    traZODone (DESYREL) tablet 150 mg  150 mg Oral HS    traZODone (DESYREL) tablet 50 mg  50 mg Oral HS PRN    venlafaxine (EFFEXOR-XR) 24 hr capsule 150 mg  150 mg Oral Daily       Behavioral Health Medications: all current active meds have been reviewed and continue current psychiatric medications  Vitals:  Vitals:    11/19/19 0740   BP: 135/73   Pulse: 99   Resp: 16   Temp: 98 5 °F (36 9 °C)   SpO2:        Laboratory results:    I have personally reviewed all pertinent laboratory/tests results  Most Recent Labs:   Lab Results   Component Value Date    WBC 8 70 11/15/2019    RBC 4 43 11/15/2019    HGB 14 6 11/15/2019    HCT 42 3 11/15/2019     11/15/2019    RDW 13 9 11/15/2019    NEUTROABS 4 30 11/15/2019    SODIUM 136 (L) 11/18/2019    K 3 7 11/18/2019    CL 96 (L) 11/18/2019    CO2 35 (H) 11/18/2019    BUN 15 11/18/2019    CREATININE 0 77 11/18/2019    GLUCOSE 97 01/03/2016    GLUC 131 (H) 11/18/2019    GLUF 131 (H) 11/18/2019    CALCIUM 9 0 11/18/2019    AST 28 11/18/2019    ALT 48 11/18/2019    ALKPHOS 52 11/18/2019    TP 6 5 11/18/2019    ALB 3 6 11/18/2019    TBILI 0 70 11/18/2019    CHOLESTEROL 97 11/15/2019    HDL 62 11/15/2019    TRIG 157 (H) 11/15/2019    LDLCALC 4 11/15/2019    NONHDLC 35 11/15/2019    [id number] 2 170 11/15/2019    FREET4 0 87 05/26/2019    PREGSERUM Negative 11/15/2019    RPR Non-Reactive 11/15/2019    HGBA1C 5 4 11/15/2019     11/15/2019       Psychiatric Review of Systems:    Behavior over the last 24 hours:  improved     Sleep: normal  Appetite: normal  Medication side effects: No  ROS: no complaints    Mental Status Evaluation:    Appearance:  casually dressed, overweight   Behavior:  cooperative, calm, good eye contact, psychomotor retardation   Speech:  fluent, decreased rate, slow, soft, decreased volume   Mood:  less anxious, less depressed   Affect:  Less blunted   Thought Process:  organized, goal directed, linear   Associations: concrete associations, thought blocking   Thought Content:  no overt delusions   Perceptual Disturbances: no auditory hallucinations, no visual hallucinations   Risk Potential: Suicidal ideation - None  Homicidal ideation - None  Potential for aggression - No   Sensorium:  oriented to person, place and time/date   Memory:  recent and remote memory grossly intact   Consciousness:  alert and awake   Attention: decreased concentration and decreased attention span   Insight:  improving   Judgment: improving   Gait/Station: normal gait/station, normal balance   Motor Activity: no abnormal movements       Progress Toward Goals:     Improving     Assessment/Plan   Principal Problem:    Bipolar I disorder, most recent episode depressed, severe without psychotic features ([facility] )  Active Problems:    Essential hypertension    Acquired hypothyroidism    Gastritis    Uncomplicated alcohol dependence (HCC)    Post-traumatic stress disorder, chronic    Hypercholesteremia    Tobacco abuse    Generalized anxiety disorder    Transaminitis      Recommended Treatment: Continue with group therapy, milieu therapy and occupational therapy  Treatment plan and medication changes discussed and per the attending physician the plan is: 1  Behavioral Health checks every 7 minutes  2  Continue with current medication regimen:  Lamictal 25 mg daily at bedtime, clonidine 0 1 mg b i d  Daily, Neurontin 400 mg t i d  Daily, Effexor 150 mg daily in the morning, Latuda 80 mg daily with dinner, continue with Melatonin 3 mg daily at bedtime  Continue with medical medications as ordered  3  Disposition planning possible for Wednesday  Risks / Benefits of Treatment:     Risks, benefits, and possible side effects of medications explained to patient and patient verbalizes understanding and agreement for treatment  Counseling / Coordination of Care:     Patient's progress reviewed with nursing staff  Medications, treatment progress and treatment plan reviewed with patient  Supportive counseling provided to the patient            Ronny Love

## 2019-11-19 NOTE — CASE MANAGEMENT
Case Management Readmission Assessment    Current Admission Date:  11/14/2019 Previous Admission - Discharge Date:  9/26/19 Number of Days Between Admissions:      Current Admission Diagnosis:  Patient Active Problem List   Diagnosis    Essential hypertension    Acquired hypothyroidism    Gastritis    Chest pain    [facility]    h/o Seizure ([facility] )    Post-traumatic stress disorder, chronic    Bipolar I disorder, most recent episode depressed, severe without psychotic features ([facility] )    Reported medication overdose self-harm    Heart palpitations    Hypercholesteremia    Tobacco abuse    Generalized anxiety disorder    Transaminitis    Previous Discharge Diagnosis:  Major depressive disorder without psychotic features, alcohol use disorder severe  Has the patient had more than one readmission in the past 90 days? yes      Assess for all Readmissions:    1  Where were you prior to coming to the hospital for this admission? Home/Self-Care   2  What do you feel caused you to come back into the hospital again? Stress at home   3  Did you contact your physician prior to coming to the hospital?  No           4  Did the physician direct you to come to the Emergency Department? No   5  Were all of your needs addressed before you left the hospital? Yes           Assess for Readmissions from Home, Home with Home Health or AL/PCF/Group Home:    6  Did you have an appointment with your doctor after you were discharged? Yes   7  Were you able to go to the appointment as scheduled?            a   If no, why not? Yes        8  Were your Discharge Instructions easy to understand? Yes   9  Were you able to get all of your medications?            a   If no, why not? Yes               b   Did you take your medications as they were prescribed?            c,  If no, why not? Yes               d   Did you understand the purpose for taking each of your medications? Yes   10   Did you understand how to care for yourself and who to call if you had a                      problem?    Yes     Glynn Aly,          November 19, 2019

## 2019-11-19 NOTE — DISCHARGE INSTR - OTHER ORDERS
525 [facility] Phone Number : [phone] [facility] Crisis Phone Number : [phone]    National Suicide Hotline : 1 863.982.8207    Crisis Text Line : 271 [facility] WALK-IN APPOINTMENTS    [place] [address]  PHONE : [phone]    WALK-IN HOURS:  Jeremy Betts, and Thursday from 9:00AM-11:30AM AND Wednesday from 9:00AM-11:30AM AND 1:00PM-3:00PM     PLEASE BRING PHOTO ID,INSURANCE CARD AND PROOF OF INCOME  The [employer] Family-to-Family Education Program is a free 12-week (2 1/2 hours/week) course for families of individuals with severe brain disorders (mental illnesses)  The classes are taught by trained family members  All course materials are furnished at no cost to you  Below are some details  To register, e-mail [email or web address] or call [phone]  The curriculum focuses on schizophrenia, bipolar disorder (manic depression), clinical depression, panic disorders and obsessive-compulsive disorder (OCD)  The course discusses the clinical treatment of these illnesses and teaches the knowledge and skills that family members need to cope more effectively    Topics Include:  Learning about feelings, learning about facts   Schizophrenia, major depression and doc: diagnosis and dealing with critical periods   Subtypes of depression and bipolar disorder, panic disorder and OCD; diagnosis and causes; sharing our stories   The biology of the brain/new research   Problem solving workshops   Medication review   Empathy workshop  what its like to have a brain disorder   Communication skills workshop   Self-care and relative groups   Rehabilitation, services available   Advocacy: fighting stigma   Review and certification ceremony    Xnod-nt-Gahz Education Course  The [employer] Education class is a ten week  two hours per week  experiential education course on the topic of recovery for any person with serious mental illness who is interested in establishing and maintaining wellness  The course uses a combination of lecture, interactive exercises, and structural group processes  The diversity of experience among participants affords for a lively dynamic that moves the course along  [employer]s Gmkk-je-Kqnd Education class is offered free of charge to people who experience mental illness  You do not need to be a member of [employer] to take the course  Courses are taught by teams of trained mentors/peer-teachers who are themselves experienced at living well with mental illness  Below are some details  To register, call [phone] or e-mail [email or web address]  Sign up today! 225 [facility] group is for family members, caregivers, and loved ones of individuals living with the everyday challenges of mental illness  The leaders are family members in the same situation  Sessions take place in an intimate, confidential setting to allow families to share openly with each other  These support groups allow participants to learn from the experiences of other group members, share coping strategies, and offer each other encouragement and understanding  Sundra Crimes know that you are not alone  Drop inno registration is necessary  Here are the times and locations  Trail  Monthly: 3rd Monday, 7:00-8:30 pm  [facility], [place]  Monthly: 4th Tuesday, 7:00-8:30 pm  [facility]  Monthly: 1st Monday, 7:00-8:30 pm  [address]         Monthly Support for Persons with Mental Illness  The Peer Support Group is a monthly meeting for individuals facing the challenges of recovering from severe and persistent mental illness   Depression, manic depression, schizophrenia, and general anxiety disorder are only a few of the diagnoses of individuals who have found a supportive place at our meetings  Our [place]  We are a fellowship of individuals who share a common goal of recovery and the ability to maintain mental and emotional stability  We help others and ourselves through sharing our experiences, strength and hope with each other  No matter how traumatic our past or how despairing our present may be, there is hope for a new day  Sessions take place in an intimate, confidential setting to allow individuals to share openly with each other  Hi Felt know that you are not alone  Drop inno registration is necessary  Here are the times and locations  PATRIC  Monthly: 1st Monday, 7:00-8:30 pm  [facility]  [address] [place], [place]   [id number]  Monthly: 3rd Monday, 7:00-8:30 pm  [address], [address]:  If you or someone you know has a drug or alcohol problem, there is help:  [address]: [address]: [phone]  An assessment is the first step  In addition to those listed there are other programs available in the area but assessment is best to determine an appropriate level of care  If you DO NOT have Medical Assistance ([place]) or [employer], an assessment can be scheduled at one of these providers:  425 [facility] Kenisha Elliott 13, [address]  [phone]   101 [facility] [address] , [place], [address]  3314 [facility] [address]   Patience Montano Yvopavithra [place], [address]   Step by 8012 [facility] [address] , [place], 98 [facility]  [phone]   Treatment Trends  [place]  1320 [facility] , [place], 98 [facility]  2000 [facility],[address] 111 Jacob Cote , [address], [place], [address] Bonnie Baxter [phone]     If you [address], an assessment can be scheduled at one of these providers:  [place] [facility]  [address] , \[facility]", 98 [facility] [address]  Kenisha Pen[address] 13, [address]  [address] D&A Intake Unit 1001 [facility] [address] , 1st Floor, [place], [address]  [address], 300 [facility],6Th Floor, Kaci Osorio, 4420 [facility] [place] [address] , \[facility]\"", [address]  [address], 122 [facility]) [address], [address], 75 [place] Aubrie   Step by 8012 [facility] [address] , \[facility]\"", 98 [facility]  [phone]   Treatment Trends  [place]  1320 [facility] , \[facility]\"", 98 [facility]  2000 [facility],[address] , [address], \[facility]\"", [address] [facility] [phone]     If you [address], an assessment can be scheduled at one of these providers  Please contact these Providers to determine if they are in your network plan:  [facility] D&A Intake Unit  620 [facility] [address] , 1st Floor, [place], [address]  [address] , \[facility]\"", [address]  [address]  [place], 122 [facility]) [address], [address] 253 [facility]  [address] [place] [address], [address] [facility] [address] One [facility] [address] , 69 Rosario Means, \[facility]\"", 2275  [facility] PAUL [address]

## 2019-11-19 NOTE — PROGRESS NOTES
Kina Mccall has been in the milieu during this evening  She interacts with peers quietly  She is pleasant and appropriate  She reports depression is improving and she denies suicidal ideas  She has a 72 hour notice ongoing since today at 36 although she is hoping for discharge chun Acevedo was increased to 80mgs at dinner this evening and she had Melatonin at HS,

## 2019-11-19 NOTE — PROGRESS NOTES
11/19/19 1100   Activity/Group Checklist   Group   (Recovery group)   Attendance Attended   Attendance Duration (min) 46-60   Interactions Interacted appropriately   Affect/Mood Appropriate   Goals Achieved Able to listen to others; Able to engage in interactions   Topic: TJ/ Alessandro Palomares 41 of needs

## 2019-11-19 NOTE — CASE MANAGEMENT
Discharge IMM signed and placed in patients discharge folder  Copy placed in IMM folder in 's office

## 2019-11-19 NOTE — CASE MANAGEMENT
YAMILETH called for transportation upon discharge tomorrow  Pt will be picked up at 1:30 via [facility]

## 2019-11-19 NOTE — PROGRESS NOTES
Patient about the unit  Bright on approach, pleasant and cooperative  Denies all symptoms and is looking forward to discharge tomorrow  Was hopeful that she was able to leave today, but says that she understands she has to wait until tomorrow

## 2019-11-19 NOTE — PLAN OF CARE
Problem: DEPRESSION  Goal: Will be euthymic at discharge  Description  INTERVENTIONS:  - Administer medication as ordered  - Provide emotional support via 1:1 interaction with staff  - Encourage involvement in milieu/groups/activities  - Monitor for social isolation  Outcome: Adequate for Discharge     Problem: ANXIETY  Goal: Will report anxiety at manageable levels  Description  INTERVENTIONS:  - Administer medication as ordered  - Teach and encourage coping skills  - Provide emotional support  - Assess patient/family for anxiety and ability to cope  Outcome: Adequate for Discharge  Goal: By discharge: Patient will verbalize 2 strategies to deal with anxiety  Description  Interventions:  - Identify any obvious source/trigger to anxiety  - Staff will assist patient in applying identified coping technique/skills  - Encourage attendance of scheduled groups and activities  Outcome: Adequate for Discharge     Problem: SUBSTANCE USE/ABUSE  Goal: Will have no detox symptoms and will verbalize plan for changing substance-related behavior  Description  INTERVENTIONS:  - Monitor physical status and assess for symptoms of withdrawal  - Administer medication as ordered  - Provide emotional support with 1 on 1 interaction with staff  - Encourage recovery focused program/ addiction education  - Assess for verbalization of changing behaviors related to substance abuse  - Initiate consults and referrals as appropriate (Case Management, Spiritual Care, etc )  Outcome: Adequate for Discharge  Goal: By discharge, will develop insight into their chemical dependency and sustain motivation to continue in recovery  Description  INTERVENTIONS:  - Attends all daily group sessions and scheduled AA groups  - Actively practices coping skills through participation in the therapeutic community and adherence to program rules  - Reviews and completes assignments from individual treatment plan  - Assist patient development of understanding of their personal cycle of addiction and relapse triggers  Outcome: Adequate for Discharge  Goal: By discharge, patient will have ongoing treatment plan addressing chemical dependency  Description  INTERVENTIONS:  - Assist patient with resources and/or appointments for ongoing recovery based living  Outcome: Adequate for Discharge     Problem: SELF CARE DEFICIT  Goal: Return ADL status to a safe level of function  Description  INTERVENTIONS:  - Administer medication as ordered  - Assess ADL deficits and provide assistive devices as needed  - Obtain PT/OT consults as needed  - Assist and instruct patient to increase activity and self care as tolerated  Outcome: Adequate for Discharge     Problem: DISCHARGE PLANNING  Goal: Discharge to home or other facility with appropriate resources  Description  INTERVENTIONS:  - Identify barriers to discharge w/patient and caregiver  - Arrange for needed discharge resources and transportation as appropriate  - Identify discharge learning needs (meds, wound care, etc )  - Arrange for interpretive services to assist at discharge as needed  - Refer to Case Management Department for coordinating discharge planning if the patient needs post-hospital services based on physician/advanced practitioner order or complex needs related to functional status, cognitive ability, or social support system  Outcome: Adequate for Discharge     Problem: Ineffective Coping  Goal: Participates in unit activities  Description  Interventions:  - Provide therapeutic environment   - Provide required programming   - Redirect inappropriate behaviors   Outcome: Adequate for Discharge

## 2019-11-20 VITALS
DIASTOLIC BLOOD PRESSURE: 89 MMHG | BODY MASS INDEX: 28.72 KG/M2 | OXYGEN SATURATION: 99 % | HEIGHT: 67 IN | HEART RATE: 92 BPM | SYSTOLIC BLOOD PRESSURE: 144 MMHG | WEIGHT: 183 LBS | TEMPERATURE: 97.8 F | RESPIRATION RATE: 16 BRPM

## 2019-11-20 DIAGNOSIS — F31.4 BIPOLAR AFFECTIVE DISORDER, DEPRESSED, SEVERE (HCC): ICD-10-CM

## 2019-11-20 PROCEDURE — 99238 HOSP IP/OBS DSCHRG MGMT 30/<: CPT | Performed by: NURSE PRACTITIONER

## 2019-11-20 RX ORDER — HYDROXYZINE HYDROCHLORIDE 25 MG/1
25 TABLET, FILM COATED ORAL EVERY 6 HOURS PRN
Qty: 60 TABLET | Refills: 1 | Status: SHIPPED | OUTPATIENT
Start: 2019-11-20 | End: 2019-12-19 | Stop reason: HOSPADM

## 2019-11-20 RX ORDER — LAMOTRIGINE 25 MG/1
TABLET ORAL
Qty: 90 TABLET | Refills: 0 | Status: SHIPPED | OUTPATIENT
Start: 2019-11-20 | End: 2019-12-19 | Stop reason: HOSPADM

## 2019-11-20 RX ADMIN — FOLIC ACID 1 MG: 1 TABLET ORAL at 08:12

## 2019-11-20 RX ADMIN — GABAPENTIN 400 MG: 400 CAPSULE ORAL at 08:12

## 2019-11-20 RX ADMIN — LISINOPRIL 20 MG: 20 TABLET ORAL at 08:12

## 2019-11-20 RX ADMIN — NICOTINE POLACRILEX 2 MG: 2 GUM, CHEWING ORAL at 09:01

## 2019-11-20 RX ADMIN — LEVOTHYROXINE SODIUM 50 MCG: 50 TABLET ORAL at 06:18

## 2019-11-20 RX ADMIN — HYDROCHLOROTHIAZIDE 25 MG: 25 TABLET ORAL at 08:12

## 2019-11-20 RX ADMIN — Medication 1 TABLET: at 08:11

## 2019-11-20 RX ADMIN — AMLODIPINE BESYLATE 5 MG: 5 TABLET ORAL at 08:12

## 2019-11-20 RX ADMIN — PANTOPRAZOLE SODIUM 40 MG: 40 TABLET, DELAYED RELEASE ORAL at 06:18

## 2019-11-20 RX ADMIN — NICOTINE 21 MG: 21 PATCH, EXTENDED RELEASE TRANSDERMAL at 08:12

## 2019-11-20 RX ADMIN — SUCRALFATE 1 G: 1 TABLET ORAL at 12:34

## 2019-11-20 RX ADMIN — THIAMINE HCL TAB 100 MG 100 MG: 100 TAB at 08:12

## 2019-11-20 RX ADMIN — METOPROLOL TARTRATE 50 MG: 25 TABLET, FILM COATED ORAL at 08:12

## 2019-11-20 RX ADMIN — CLONIDINE HYDROCHLORIDE 0.1 MG: 0.1 TABLET ORAL at 08:11

## 2019-11-20 RX ADMIN — SUCRALFATE 1 G: 1 TABLET ORAL at 08:12

## 2019-11-20 RX ADMIN — VENLAFAXINE HYDROCHLORIDE 150 MG: 150 CAPSULE, EXTENDED RELEASE ORAL at 08:12

## 2019-11-20 NOTE — PROGRESS NOTES
The patient is out in the milieu and is bright and pleasant  She is looking forward to discharge tomorrow and getting back to her familiar surroundings  She acknowledges her mood has improved since admission as has her anxiety level  Pleasant and helpful towards peers

## 2019-11-20 NOTE — DISCHARGE INSTR - LAB
If you smoke, use tobacco or nicotine, and/or are exposed to second hand smoke, you are encouraged to stop to improve your health    If you need help quitting, please talk to your health care provider or call:  · Libia Glez ([phone])  · [facility] ([phone])   · [address] [place] [address] ([phone])

## 2019-11-20 NOTE — BH TRANSITION RECORD
Contact Information: If you have any questions, concerns, pended studies, tests and/or procedures, or emergencies regarding your inpatient behavioral health visit  Please contact [facility] behavioral health unit [facility]B [phone]  and ask to speak to a , nurse or physician  A contact is available 24 hours/ 7 days a week at this number  Summary of Procedures Performed During your Stay:  Below is a list of major procedures performed during your hospital stay and a summary of results:  - Cardiac Procedures/Studies: EKG  Pending Studies (From admission, onward)    None        If studies are pending at discharge, follow up with your PCP and/or referring provider  Post-Care Instructions: I reviewed with the patient in detail post-care instructions. Patient understands to keep the injection sites clean and call the clinic if there is any redness, swelling or pain.

## 2019-11-20 NOTE — PROGRESS NOTES
11/20/19 0800   Team Meeting   Meeting Type Daily Rounds   Initial Conference Date 11/20/19   Team Members Present   Team Members Present Physician;Nurse;; Other (Discipline and Name)   Physician Team Member [facility]    Nursing Team Member [place] Work Team Member Leon Sherman   Other (Discipline and Name) L Pope Elna Dandy and Martha Augustin  Patient/Family Present   Patient Present No   Patient's Family Present No     D/C today at 1:30 with [facility]

## 2019-11-20 NOTE — NURSING NOTE
Medications and follow up appointments reviewed with patient prior to discharge  Patient did not have any questions
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
## 2019-11-20 NOTE — PROGRESS NOTES
[facility] GROUP NOTE  Full, Active participation  Social with peers  Expressed excitement,and feeling "a little nervous" about discharge  Feelings affirmed, encouragement provided  11/20/19 1000   Activity/Group Checklist   Group Life Skills  (Letting Go)   Attendance Attended   Attendance Duration (min) 46-60   Interactions Interacted appropriately   Affect/Mood Appropriate;Bright   Goals Achieved Able to listen to others; Able to engage in interactions; Increased hopefulness; Identified feelings

## 2019-11-20 NOTE — PROGRESS NOTES
11/20/19 1100   Activity/Group Checklist   Group   (recovery group)   Attendance Attended   Attendance Duration (min) 46-60   Interactions Interacted appropriately   Affect/Mood Appropriate   Goals Achieved Able to listen to others; Able to engage in interactions   Empathy and self esteem

## 2019-11-20 NOTE — PLAN OF CARE
Problem: DEPRESSION  Goal: Will be euthymic at discharge  Description  INTERVENTIONS:  - Administer medication as ordered  - Provide emotional support via 1:1 interaction with staff  - Encourage involvement in milieu/groups/activities  - Monitor for social isolation  Outcome: Completed     Problem: ANXIETY  Goal: Will report anxiety at manageable levels  Description  INTERVENTIONS:  - Administer medication as ordered  - Teach and encourage coping skills  - Provide emotional support  - Assess patient/family for anxiety and ability to cope  Outcome: Completed  Goal: By discharge: Patient will verbalize 2 strategies to deal with anxiety  Description  Interventions:  - Identify any obvious source/trigger to anxiety  - Staff will assist patient in applying identified coping technique/skills  - Encourage attendance of scheduled groups and activities  Outcome: Completed     Problem: SUBSTANCE USE/ABUSE  Goal: Will have no detox symptoms and will verbalize plan for changing substance-related behavior  Description  INTERVENTIONS:  - Monitor physical status and assess for symptoms of withdrawal  - Administer medication as ordered  - Provide emotional support with 1 on 1 interaction with staff  - Encourage recovery focused program/ addiction education  - Assess for verbalization of changing behaviors related to substance abuse  - Initiate consults and referrals as appropriate (Case Management, Spiritual Care, etc )  Outcome: Completed  Goal: By discharge, will develop insight into their chemical dependency and sustain motivation to continue in recovery  Description  INTERVENTIONS:  - Attends all daily group sessions and scheduled AA groups  - Actively practices coping skills through participation in the therapeutic community and adherence to program rules  - Reviews and completes assignments from individual treatment plan  - Assist patient development of understanding of their personal cycle of addiction and relapse triggers  Outcome: Completed  Goal: By discharge, patient will have ongoing treatment plan addressing chemical dependency  Description  INTERVENTIONS:  - Assist patient with resources and/or appointments for ongoing recovery based living  Outcome: Completed     Problem: SELF CARE DEFICIT  Goal: Return ADL status to a safe level of function  Description  INTERVENTIONS:  - Administer medication as ordered  - Assess ADL deficits and provide assistive devices as needed  - Obtain PT/OT consults as needed  - Assist and instruct patient to increase activity and self care as tolerated  Outcome: Completed     Problem: DISCHARGE PLANNING  Goal: Discharge to home or other facility with appropriate resources  Description  INTERVENTIONS:  - Identify barriers to discharge w/patient and caregiver  - Arrange for needed discharge resources and transportation as appropriate  - Identify discharge learning needs (meds, wound care, etc )  - Arrange for interpretive services to assist at discharge as needed  - Refer to Case Management Department for coordinating discharge planning if the patient needs post-hospital services based on physician/advanced practitioner order or complex needs related to functional status, cognitive ability, or social support system  Outcome: Completed     Problem: Ineffective Coping  Goal: Participates in unit activities  Description  Interventions:  - Provide therapeutic environment   - Provide required programming   - Redirect inappropriate behaviors   Outcome: Completed

## 2019-11-20 NOTE — PLAN OF CARE
Pt continues to drink while taking medications  Pt verbalizes feeling overwhelmed and depressed but does not utilize appropriate coping skills upon discharge  Pt is referred to outpatient mental health and Behavioral  through [facility]  This is Pt 2nd admit in a short period for the same reasons  SW expressed the importance of D&A treatment and gave Pt resources  Pt is not willing to participate in D&A treatment  Pt is in agreement with D/C today  Mental health symptoms were treated through therapeutic interventions and medication management while on the unit  Pt prognosis is poor considering she has little motivation to change her drinking habits and attend treatment for D&A  Pt will be transported home via [facility] at 1:30pm  Pt father was notified, messages left  Pt has scheduled appointment for [facility] tomorrow at 2:00pm and was informed of appt by YAMILETH and nursing  Pt has appointment on Dec  4th outpatient but was encouraged to call for sooner appointment if there are cancellations she will be priority to take that appointment  At this time Pt is stable, medication compliant, no SI/HI, and no longer meeting the level for inpatient care       Problem: DEPRESSION  Goal: Will be euthymic at discharge  Description  INTERVENTIONS:  - Administer medication as ordered  - Provide emotional support via 1:1 interaction with staff  - Encourage involvement in milieu/groups/activities  - Monitor for social isolation  Outcome: Adequate for Discharge  Variance Patient Uncooperative  Impact: High  Note:   Pt continues to drink alcohol while on depression medication but refuses D&A treatment     Problem: ANXIETY  Goal: Will report anxiety at manageable levels  Description  INTERVENTIONS:  - Administer medication as ordered  - Teach and encourage coping skills  - Provide emotional support  - Assess patient/family for anxiety and ability to cope  Outcome: Adequate for Discharge  Goal: By discharge: Patient will verbalize 2 strategies to deal with anxiety  Description  Interventions:  - Identify any obvious source/trigger to anxiety  - Staff will assist patient in applying identified coping technique/skills  - Encourage attendance of scheduled groups and activities  Outcome: Adequate for Discharge     Problem: SUBSTANCE USE/ABUSE  Goal: Will have no detox symptoms and will verbalize plan for changing substance-related behavior  Description  INTERVENTIONS:  - Monitor physical status and assess for symptoms of withdrawal  - Administer medication as ordered  - Provide emotional support with 1 on 1 interaction with staff  - Encourage recovery focused program/ addiction education  - Assess for verbalization of changing behaviors related to substance abuse  - Initiate consults and referrals as appropriate (Case Management, Spiritual Care, etc )  Outcome: Adequate for Discharge  Goal: By discharge, will develop insight into their chemical dependency and sustain motivation to continue in recovery  Description  INTERVENTIONS:  - Attends all daily group sessions and scheduled AA groups  - Actively practices coping skills through participation in the therapeutic community and adherence to program rules  - Reviews and completes assignments from individual treatment plan  - Assist patient development of understanding of their personal cycle of addiction and relapse triggers  Outcome: Adequate for Discharge  Goal: By discharge, patient will have ongoing treatment plan addressing chemical dependency  Description  INTERVENTIONS:  - Assist patient with resources and/or appointments for ongoing recovery based living  Outcome: Adequate for Discharge  Variance Patient Uncooperative  Impact: High  Note:   Pt was offered DA treatment on during both of her recent inpatient stays   She refuses but is willing to attend outpatient mental health treatment     Problem: DISCHARGE PLANNING  Goal: Discharge to home or other facility with appropriate resources  Description  INTERVENTIONS:  - Identify barriers to discharge w/patient and caregiver  - Arrange for needed discharge resources and transportation as appropriate  - Identify discharge learning needs (meds, wound care, etc )  - Arrange for interpretive services to assist at discharge as needed  - Refer to Case Management Department for coordinating discharge planning if the patient needs post-hospital services based on physician/advanced practitioner order or complex needs related to functional status, cognitive ability, or social support system  Outcome: Adequate for Discharge     Problem: Ineffective Coping  Goal: Participates in unit activities  Description  Interventions:  - Provide therapeutic environment   - Provide required programming   - Redirect inappropriate behaviors   Outcome: Adequate for Discharge

## 2019-11-20 NOTE — PROGRESS NOTES
Patient about the unit  Pleasant, cooperative and denies all symptoms  Patient did report that she was a bit anxious yesterday due to peers behaviors on the unit  States that she was tempted to ask for a prn for anxiety but was able to "work through it"  Compliant with medications and is looking forward to discharge today

## 2019-11-20 NOTE — DISCHARGE INSTRUCTIONS
Anxiety   WHAT YOU SHOULD KNOW:   Anxiety is a condition that causes you to feel excessive worry, uneasiness, or fear  Family or work stress, smoking, caffeine, and alcohol can increase your risk for anxiety  Certain medicines or health conditions can also increase your risk  Anxiety may begin gradually, and can become a long-term condition if it is not managed or treated  AFTER YOU LEAVE:   Medicines:   · Medicines  can help you feel more calm and relaxed, and decrease your symptoms  · Take your medicine as directed  Contact your healthcare provider if you think your medicine is not helping or if you have side effects  Tell him if you are allergic to any medicine  Keep a list of the medicines, vitamins, and herbs you take  Include the amounts, and when and why you take them  Bring the list or the pill bottles to follow-up visits  Carry your medicine list with you in case of an emergency  Follow up with your healthcare provider within 2 weeks or as directed:  Write down your questions so you remember to ask them during your visits  Manage anxiety:   · Go to counseling as directed  Cognitive behavioral therapy can help you understand and change how you react to events that trigger your symptoms  · Find ways to manage your symptoms  Activities such as exercise, meditation, or listening to music can help you relax  · Practice deep breathing  Breathing can change how your body reacts to stress  Focus on taking slow, deep breaths several times a day, or during an anxiety attack  Breathe in through your nose, and out through your mouth  · Avoid caffeine  Caffeine can make your symptoms worse  Avoid foods or drinks that are meant to increase your energy level  · Limit or avoid alcohol  Ask your healthcare provider if alcohol is safe for you  You may not be able to drink alcohol if you take certain anxiety or depression medicines  Limit alcohol to 1 drink per day if you are a woman   Limit alcohol to 2 drinks per day if you are a man  A drink of alcohol is 12 ounces of beer, 5 ounces of wine, or 1½ ounces of liquor  Contact your healthcare provider if:   · Your symptoms get worse or do not get better with treatment  · You think your medicine may be causing side effects  · Your anxiety keeps you from doing your regular daily activities  · You have new symptoms since your last visit  · You have questions or concerns about your condition or care  Seek care immediately or call 911 if:   · You have chest pain, tightness, or heaviness that may spread to your shoulders, arms, jaw, neck, or back  · You feel like hurting yourself or someone else  · You feel dizzy, lightheaded, or faint  © 2014 [address] is for End User's use only and may not be sold, redistributed or otherwise used for commercial purposes  All illustrations and images included in CareNotes® are the copyrighted property of [employer]  or [employer] Jazlyn  The above information is an  only  It is not intended as medical advice for individual conditions or treatments  Talk to your doctor, nurse or pharmacist before following any medical regimen to see if it is safe and effective for you  Abuse of Alcohol   AMBULATORY CARE:   Alcohol abuse   · is unhealthy drinking behavior  You may drink too much at one time once a week, or continue to drink too much daily  You continue to drink even though it causes problems  The problems can be alcohol related legal problems or problems with work or family  · If you drink too much at one time, you are binge drinking  Binge drinking is when you have a large amount of alcohol in a short time  Your blood alcohol concentrations (MYLES) goes above 0 08 g/dLlevel during binge drinking  For men, this usually happens with more than 4 drinks in 2 hours  For women, it is more than 3 drinks in 2 hours   A drink is 12 ounces of beer, 4 ounces of wine, or 1½ ounces of liquor  Common signs and symptoms of alcohol abuse include:  Each person that abuses alcohol may have different symptoms  The following are common signs and symptoms of alcohol abuse:  · Loss of interest in activities, work, and school    · Decreased interest in family and friends    · Depression    · Constant thoughts about drinking    · Not able to control the amount you drink    · Restlessness, or erratic and violent behavior  Call 911 for any of the following:   · You have sudden chest pain or trouble breathing  · You have a seizure or have shaking or trembling  · You feel like you could harm yourself or others  · You were in an accident because of alcohol  Seek care immediately if:   · You have hallucinations (you see or hear things that are not real)  · You cannot stop vomiting or you vomit blood  Contact your healthcare provider if:   · You need help to stop drinking alcohol  · You have questions or concerns about your condition or care  Long-term effects of alcohol abuse:   · Blackouts    · Memory loss    · Dementia    · Liver disease    · Thiamine (vitamin B1) deficiency  Treatment for alcohol abuse  may include the following:  · Detoxification (detox) and withdrawal  is a program that helps you to safely get alcohol out of your body  Detox can also help get rid of the physical need to drink  Healthcare providers monitor the physical symptoms of withdrawal  They may give you medicines to help decrease nausea, dehydration, and seizures  Healthcare providers will also monitor your blood pressure, heart and breathing rates, and your temperature  Symptoms of anxiety, depression, and suicidal thoughts are also monitored and managed during detox  Healthcare providers may give you medicines for these symptoms and therapy sessions will be available to you   Detox is usually done at a detox center or in a hospital  Healthcare providers do not recommend that you try to detox at home or by yourself  Withdrawal symptoms may become life threatening  The center can help you find 12 step programs or an individual therapist to help with emotional support after detox  · Inpatient and outpatient treatment  focus on your personal needs to help you stop drinking  Treatment helps you understand the reasons you abuse alcohol  Counselors and therapists provide you with support and help you find ways to cope instead of drinking  You may need inpatient treatment to provide a controlled environment  You may need outpatient treatment after your inpatient treatment is complete  · Alcohol aversion therapy  takes away the desire to drink by causing a negative reaction when you drink  Healthcare providers may give you medicines that cause nausea and vomiting when you drink alcohol  They may instead give you a medicine that decreases your urge to drink alcohol  These medicines are used to help you stop drinking or reduce the amount you drink  They can also help you avoid relapse  Risks of alcohol abuse:  Alcohol abuse increases your risk for gastrointestinal cancers, brain damage and problems with your immune system  It also increases your risk for heart, kidney, and lung damage  The risk of stroke increases with alcohol abuse  If you are pregnant and drink alcohol, you and your baby are at risk for serious health problems  Avoid alcohol:  You should stop drinking entirely  Alcohol can damage your brain, heart, and liver  It also increases your risk for injury, high blood pressure, and certain types of cancer  Alcohol is dangerous when you combine it with certain medicines  Do not drive if you drink alcohol:  Make sure someone who has not been drinking can help you get home  Get support:  Most people need support to stop drinking alcohol  Mental health providers, support groups, rehabilitation centers, and your healthcare provider can provide support     For more information:   · Alcoholics Anonymous  Web Address: http://www [employer]/  · Substance Abuse and [facility] [address] [place] [phone]  Web Address: https://[employer]/  Follow up with your healthcare provider as directed:  Write down your questions so you remember to ask them during your visits  © 2017 [address] Information is for End User's use only and may not be sold, redistributed or otherwise used for commercial purposes  All illustrations and images included in CareNotes® are the copyrighted property of [employer] M [employer] Inc  or Dennis Hobson  The above information is an  only  It is not intended as medical advice for individual conditions or treatments  Talk to your doctor, nurse or pharmacist before following any medical regimen to see if it is safe and effective for you

## 2019-12-11 ENCOUNTER — APPOINTMENT (EMERGENCY)
Dept: RADIOLOGY | Facility: HOSPITAL | Age: 54
DRG: 885 | End: 2019-12-11
Payer: MEDICARE

## 2019-12-11 ENCOUNTER — HOSPITAL ENCOUNTER (OUTPATIENT)
Facility: HOSPITAL | Age: 54
Setting detail: OBSERVATION
DRG: 885 | End: 2019-12-12
Attending: EMERGENCY MEDICINE | Admitting: PSYCHIATRY & NEUROLOGY
Payer: MEDICARE

## 2019-12-11 DIAGNOSIS — F10.20 UNCOMPLICATED ALCOHOL DEPENDENCE (HCC): Chronic | ICD-10-CM

## 2019-12-11 DIAGNOSIS — T14.91XA SUICIDE ATTEMPT (HCC): Primary | ICD-10-CM

## 2019-12-11 DIAGNOSIS — F31.4 BIPOLAR I DISORDER, MOST RECENT EPISODE DEPRESSED, SEVERE WITHOUT PSYCHOTIC FEATURES (HCC): Chronic | ICD-10-CM

## 2019-12-11 DIAGNOSIS — T39.1X2A INTENTIONAL ACETAMINOPHEN OVERDOSE, INITIAL ENCOUNTER (HCC): ICD-10-CM

## 2019-12-11 PROBLEM — F10.10 ALCOHOL ABUSE: Status: ACTIVE | Noted: 2019-12-11

## 2019-12-11 LAB
ALBUMIN SERPL BCP-MCNC: 3.2 G/DL (ref 3–5.2)
ALBUMIN SERPL BCP-MCNC: 4.3 G/DL (ref 3–5.2)
ALP SERPL-CCNC: 22 U/L (ref 43–122)
ALP SERPL-CCNC: 86 U/L (ref 43–122)
ALT SERPL W P-5'-P-CCNC: 119 U/L (ref 9–52)
ALT SERPL W P-5'-P-CCNC: 132 U/L (ref 9–52)
AMPHETAMINES SERPL QL SCN: NEGATIVE
ANION GAP SERPL CALCULATED.3IONS-SCNC: 13 MMOL/L (ref 5–14)
ANION GAP SERPL CALCULATED.3IONS-SCNC: 9 MMOL/L (ref 5–14)
APAP SERPL-MCNC: 15 UG/ML (ref 10–20)
APAP SERPL-MCNC: <10 UG/ML (ref 10–20)
AST SERPL W P-5'-P-CCNC: 102 U/L (ref 14–36)
AST SERPL W P-5'-P-CCNC: 119 U/L (ref 14–36)
BACTERIA UR QL AUTO: ABNORMAL /HPF
BARBITURATES UR QL: NEGATIVE
BASOPHILS # BLD AUTO: 0.1 THOUSANDS/ΜL (ref 0–0.1)
BASOPHILS NFR BLD AUTO: 1 % (ref 0–1)
BENZODIAZ UR QL: NEGATIVE
BILIRUB DIRECT SERPL-MCNC: 0.2 MG/DL
BILIRUB SERPL-MCNC: 0.9 MG/DL
BILIRUB SERPL-MCNC: 1 MG/DL
BILIRUB UR QL STRIP: NEGATIVE
BUN SERPL-MCNC: 14 MG/DL (ref 5–25)
BUN SERPL-MCNC: 22 MG/DL (ref 5–25)
CALCIUM SERPL-MCNC: 7.1 MG/DL (ref 8.4–10.2)
CALCIUM SERPL-MCNC: 8.9 MG/DL (ref 8.4–10.2)
CHLORIDE SERPL-SCNC: 103 MMOL/L (ref 97–108)
CHLORIDE SERPL-SCNC: 103 MMOL/L (ref 97–108)
CLARITY UR: CLEAR
CO2 SERPL-SCNC: 21 MMOL/L (ref 22–30)
CO2 SERPL-SCNC: 22 MMOL/L (ref 22–30)
COCAINE UR QL: NEGATIVE
COLOR UR: ABNORMAL
CREAT SERPL-MCNC: 0.49 MG/DL (ref 0.6–1.2)
CREAT SERPL-MCNC: 0.76 MG/DL (ref 0.6–1.2)
EOSINOPHIL # BLD AUTO: 0 THOUSAND/ΜL (ref 0–0.4)
EOSINOPHIL NFR BLD AUTO: 0 % (ref 0–6)
ERYTHROCYTE [DISTWIDTH] IN BLOOD BY AUTOMATED COUNT: 13.9 %
ERYTHROCYTE [DISTWIDTH] IN BLOOD BY AUTOMATED COUNT: 14.2 %
ETHANOL SERPL-MCNC: 169 MG/DL (ref 0–10)
EXT PREG TEST URINE: NEGATIVE
EXT. CONTROL ED NAV: NORMAL
FINE GRAN CASTS URNS QL MICRO: ABNORMAL /LPF
GFR SERPL CREATININE-BSD FRML MDRD: 112 ML/MIN/1.73SQ M
GFR SERPL CREATININE-BSD FRML MDRD: 90 ML/MIN/1.73SQ M
GLUCOSE P FAST SERPL-MCNC: 124 MG/DL (ref 70–99)
GLUCOSE SERPL-MCNC: 124 MG/DL (ref 70–99)
GLUCOSE SERPL-MCNC: 173 MG/DL (ref 70–99)
GLUCOSE UR STRIP-MCNC: NEGATIVE MG/DL
HCT VFR BLD AUTO: 38.9 % (ref 36–46)
HCT VFR BLD AUTO: 46.7 % (ref 36–46)
HGB BLD-MCNC: 13.6 G/DL (ref 12–16)
HGB BLD-MCNC: 16.1 G/DL (ref 12–16)
HGB UR QL STRIP.AUTO: NEGATIVE
HYALINE CASTS #/AREA URNS LPF: ABNORMAL /LPF
KETONES UR STRIP-MCNC: NEGATIVE MG/DL
LEUKOCYTE ESTERASE UR QL STRIP: NEGATIVE
LIPASE SERPL-CCNC: 150 U/L (ref 23–300)
LYMPHOCYTES # BLD AUTO: 2.6 THOUSANDS/ΜL (ref 0.5–4)
LYMPHOCYTES NFR BLD AUTO: 27 % (ref 25–45)
MCH RBC QN AUTO: 33 PG (ref 26–34)
MCH RBC QN AUTO: 33.2 PG (ref 26–34)
MCHC RBC AUTO-ENTMCNC: 34.6 G/DL (ref 31–36)
MCHC RBC AUTO-ENTMCNC: 34.8 G/DL (ref 31–36)
MCV RBC AUTO: 95 FL (ref 80–100)
MCV RBC AUTO: 95 FL (ref 80–100)
METHADONE UR QL: NEGATIVE
MONOCYTES # BLD AUTO: 0.9 THOUSAND/ΜL (ref 0.2–0.9)
MONOCYTES NFR BLD AUTO: 10 % (ref 1–10)
MUCOUS THREADS UR QL AUTO: ABNORMAL
NEUTROPHILS # BLD AUTO: 5.9 THOUSANDS/ΜL (ref 1.8–7.8)
NEUTS SEG NFR BLD AUTO: 62 % (ref 45–65)
NITRITE UR QL STRIP: NEGATIVE
NON-SQ EPI CELLS URNS QL MICRO: ABNORMAL /HPF
OPIATES UR QL SCN: NEGATIVE
PCP UR QL: NEGATIVE
PH UR STRIP.AUTO: 5 [PH]
PLATELET # BLD AUTO: 272 THOUSANDS/UL (ref 150–450)
PLATELET # BLD AUTO: 371 THOUSANDS/UL (ref 150–450)
PMV BLD AUTO: 7.2 FL (ref 8.9–12.7)
PMV BLD AUTO: 7.3 FL (ref 8.9–12.7)
POTASSIUM SERPL-SCNC: 3.2 MMOL/L (ref 3.6–5)
POTASSIUM SERPL-SCNC: 4.2 MMOL/L (ref 3.6–5)
PROT SERPL-MCNC: 6 G/DL (ref 5.9–8.4)
PROT SERPL-MCNC: 7.6 G/DL (ref 5.9–8.4)
PROT UR STRIP-MCNC: ABNORMAL MG/DL
RBC # BLD AUTO: 4.08 MILLION/UL (ref 4–5.2)
RBC # BLD AUTO: 4.89 MILLION/UL (ref 4–5.2)
RBC #/AREA URNS AUTO: ABNORMAL /HPF
SALICYLATES SERPL-MCNC: <1 MG/DL (ref 10–30)
SODIUM SERPL-SCNC: 134 MMOL/L (ref 137–147)
SODIUM SERPL-SCNC: 137 MMOL/L (ref 137–147)
SP GR UR STRIP.AUTO: 1.03 (ref 1–1.04)
THC UR QL: NEGATIVE
TSH SERPL DL<=0.05 MIU/L-ACNC: 1.05 UIU/ML (ref 0.47–4.68)
UROBILINOGEN UA: NEGATIVE MG/DL
WBC # BLD AUTO: 8.4 THOUSAND/UL (ref 4.5–11)
WBC # BLD AUTO: 9.5 THOUSAND/UL (ref 4.5–11)
WBC #/AREA URNS AUTO: ABNORMAL /HPF

## 2019-12-11 PROCEDURE — 80320 DRUG SCREEN QUANTALCOHOLS: CPT | Performed by: EMERGENCY MEDICINE

## 2019-12-11 PROCEDURE — 99205 OFFICE O/P NEW HI 60 MIN: CPT | Performed by: EMERGENCY MEDICINE

## 2019-12-11 PROCEDURE — 96361 HYDRATE IV INFUSION ADD-ON: CPT

## 2019-12-11 PROCEDURE — 99285 EMERGENCY DEPT VISIT HI MDM: CPT | Performed by: EMERGENCY MEDICINE

## 2019-12-11 PROCEDURE — 99285 EMERGENCY DEPT VISIT HI MDM: CPT

## 2019-12-11 PROCEDURE — 83690 ASSAY OF LIPASE: CPT | Performed by: EMERGENCY MEDICINE

## 2019-12-11 PROCEDURE — 93005 ELECTROCARDIOGRAM TRACING: CPT

## 2019-12-11 PROCEDURE — 80307 DRUG TEST PRSMV CHEM ANLYZR: CPT | Performed by: EMERGENCY MEDICINE

## 2019-12-11 PROCEDURE — 84443 ASSAY THYROID STIM HORMONE: CPT | Performed by: EMERGENCY MEDICINE

## 2019-12-11 PROCEDURE — 81001 URINALYSIS AUTO W/SCOPE: CPT | Performed by: EMERGENCY MEDICINE

## 2019-12-11 PROCEDURE — 36415 COLL VENOUS BLD VENIPUNCTURE: CPT | Performed by: EMERGENCY MEDICINE

## 2019-12-11 PROCEDURE — 85025 COMPLETE CBC W/AUTO DIFF WBC: CPT | Performed by: EMERGENCY MEDICINE

## 2019-12-11 PROCEDURE — 71045 X-RAY EXAM CHEST 1 VIEW: CPT

## 2019-12-11 PROCEDURE — 80048 BASIC METABOLIC PNL TOTAL CA: CPT | Performed by: FAMILY MEDICINE

## 2019-12-11 PROCEDURE — 80329 ANALGESICS NON-OPIOID 1 OR 2: CPT | Performed by: EMERGENCY MEDICINE

## 2019-12-11 PROCEDURE — 99220 PR INITIAL OBSERVATION CARE/DAY 70 MINUTES: CPT | Performed by: FAMILY MEDICINE

## 2019-12-11 PROCEDURE — 85027 COMPLETE CBC AUTOMATED: CPT | Performed by: FAMILY MEDICINE

## 2019-12-11 PROCEDURE — 81025 URINE PREGNANCY TEST: CPT | Performed by: EMERGENCY MEDICINE

## 2019-12-11 PROCEDURE — 80076 HEPATIC FUNCTION PANEL: CPT | Performed by: FAMILY MEDICINE

## 2019-12-11 PROCEDURE — 80329 ANALGESICS NON-OPIOID 1 OR 2: CPT | Performed by: FAMILY MEDICINE

## 2019-12-11 PROCEDURE — 80053 COMPREHEN METABOLIC PANEL: CPT | Performed by: EMERGENCY MEDICINE

## 2019-12-11 PROCEDURE — 96374 THER/PROPH/DIAG INJ IV PUSH: CPT

## 2019-12-11 RX ORDER — HYDROCHLOROTHIAZIDE 25 MG/1
25 TABLET ORAL DAILY
Status: DISCONTINUED | OUTPATIENT
Start: 2019-12-12 | End: 2019-12-12 | Stop reason: HOSPADM

## 2019-12-11 RX ORDER — LORAZEPAM 2 MG/ML
1 INJECTION INTRAMUSCULAR ONCE
Status: COMPLETED | OUTPATIENT
Start: 2019-12-11 | End: 2019-12-11

## 2019-12-11 RX ORDER — LAMOTRIGINE 25 MG/1
25 TABLET ORAL
Status: DISCONTINUED | OUTPATIENT
Start: 2019-12-11 | End: 2019-12-12 | Stop reason: HOSPADM

## 2019-12-11 RX ORDER — AMLODIPINE BESYLATE 5 MG/1
5 TABLET ORAL DAILY
Status: DISCONTINUED | OUTPATIENT
Start: 2019-12-12 | End: 2019-12-12 | Stop reason: HOSPADM

## 2019-12-11 RX ORDER — CLONIDINE HYDROCHLORIDE 0.1 MG/1
0.1 TABLET ORAL 2 TIMES DAILY
Status: DISCONTINUED | OUTPATIENT
Start: 2019-12-11 | End: 2019-12-12 | Stop reason: HOSPADM

## 2019-12-11 RX ORDER — LISINOPRIL 20 MG/1
20 TABLET ORAL DAILY
Status: DISCONTINUED | OUTPATIENT
Start: 2019-12-12 | End: 2019-12-12 | Stop reason: HOSPADM

## 2019-12-11 RX ORDER — LORAZEPAM 2 MG/ML
1 INJECTION INTRAMUSCULAR EVERY 6 HOURS PRN
Status: DISCONTINUED | OUTPATIENT
Start: 2019-12-11 | End: 2019-12-12 | Stop reason: HOSPADM

## 2019-12-11 RX ORDER — POTASSIUM CHLORIDE 20 MEQ/1
40 TABLET, EXTENDED RELEASE ORAL ONCE
Status: COMPLETED | OUTPATIENT
Start: 2019-12-11 | End: 2019-12-11

## 2019-12-11 RX ORDER — NICOTINE 21 MG/24HR
1 PATCH, TRANSDERMAL 24 HOURS TRANSDERMAL DAILY
Status: DISCONTINUED | OUTPATIENT
Start: 2019-12-11 | End: 2019-12-12 | Stop reason: HOSPADM

## 2019-12-11 RX ORDER — LANOLIN ALCOHOL/MO/W.PET/CERES
3 CREAM (GRAM) TOPICAL
Status: DISCONTINUED | OUTPATIENT
Start: 2019-12-11 | End: 2019-12-12 | Stop reason: HOSPADM

## 2019-12-11 RX ORDER — SODIUM CHLORIDE 9 MG/ML
125 INJECTION, SOLUTION INTRAVENOUS CONTINUOUS
Status: DISCONTINUED | OUTPATIENT
Start: 2019-12-11 | End: 2019-12-12 | Stop reason: HOSPADM

## 2019-12-11 RX ORDER — DOCUSATE SODIUM 100 MG/1
100 CAPSULE, LIQUID FILLED ORAL 2 TIMES DAILY
Status: DISCONTINUED | OUTPATIENT
Start: 2019-12-11 | End: 2019-12-12 | Stop reason: HOSPADM

## 2019-12-11 RX ORDER — METOPROLOL TARTRATE 50 MG/1
50 TABLET, FILM COATED ORAL EVERY 12 HOURS
Status: DISCONTINUED | OUTPATIENT
Start: 2019-12-11 | End: 2019-12-12 | Stop reason: HOSPADM

## 2019-12-11 RX ORDER — VENLAFAXINE HYDROCHLORIDE 150 MG/1
150 CAPSULE, EXTENDED RELEASE ORAL DAILY
Status: DISCONTINUED | OUTPATIENT
Start: 2019-12-12 | End: 2019-12-12 | Stop reason: HOSPADM

## 2019-12-11 RX ORDER — PANTOPRAZOLE SODIUM 40 MG/1
40 TABLET, DELAYED RELEASE ORAL
Status: DISCONTINUED | OUTPATIENT
Start: 2019-12-12 | End: 2019-12-12 | Stop reason: HOSPADM

## 2019-12-11 RX ORDER — SUCRALFATE 1 G/1
1 TABLET ORAL
Status: DISCONTINUED | OUTPATIENT
Start: 2019-12-11 | End: 2019-12-12 | Stop reason: HOSPADM

## 2019-12-11 RX ORDER — GABAPENTIN 400 MG/1
400 CAPSULE ORAL 3 TIMES DAILY
Status: DISCONTINUED | OUTPATIENT
Start: 2019-12-11 | End: 2019-12-12 | Stop reason: HOSPADM

## 2019-12-11 RX ORDER — ONDANSETRON 2 MG/ML
4 INJECTION INTRAMUSCULAR; INTRAVENOUS EVERY 6 HOURS PRN
Status: DISCONTINUED | OUTPATIENT
Start: 2019-12-11 | End: 2019-12-12 | Stop reason: HOSPADM

## 2019-12-11 RX ORDER — LEVOTHYROXINE SODIUM 0.05 MG/1
50 TABLET ORAL DAILY
Status: DISCONTINUED | OUTPATIENT
Start: 2019-12-12 | End: 2019-12-12 | Stop reason: HOSPADM

## 2019-12-11 RX ORDER — ONDANSETRON 2 MG/ML
4 INJECTION INTRAMUSCULAR; INTRAVENOUS ONCE
Status: COMPLETED | OUTPATIENT
Start: 2019-12-11 | End: 2019-12-11

## 2019-12-11 RX ADMIN — CLONIDINE HYDROCHLORIDE 0.1 MG: 0.1 TABLET ORAL at 17:58

## 2019-12-11 RX ADMIN — LURASIDONE HYDROCHLORIDE 80 MG: 80 TABLET, FILM COATED ORAL at 18:08

## 2019-12-11 RX ADMIN — ONDANSETRON 4 MG: 2 INJECTION, SOLUTION INTRAMUSCULAR; INTRAVENOUS at 12:28

## 2019-12-11 RX ADMIN — ENOXAPARIN SODIUM 40 MG: 40 INJECTION SUBCUTANEOUS at 16:14

## 2019-12-11 RX ADMIN — ACETYLCYSTEINE 8300 MG: 6 INJECTION, SOLUTION INTRAVENOUS at 20:02

## 2019-12-11 RX ADMIN — SODIUM CHLORIDE 125 ML/HR: 0.9 INJECTION, SOLUTION INTRAVENOUS at 15:35

## 2019-12-11 RX ADMIN — MELATONIN TAB 3 MG 3 MG: 3 TAB at 21:29

## 2019-12-11 RX ADMIN — GABAPENTIN 400 MG: 400 CAPSULE ORAL at 21:29

## 2019-12-11 RX ADMIN — SODIUM CHLORIDE 125 ML/HR: 0.9 INJECTION, SOLUTION INTRAVENOUS at 23:44

## 2019-12-11 RX ADMIN — METOPROLOL TARTRATE 50 MG: 50 TABLET, FILM COATED ORAL at 17:58

## 2019-12-11 RX ADMIN — ACETYLCYSTEINE 4160 MG: 6 INJECTION, SOLUTION INTRAVENOUS at 15:18

## 2019-12-11 RX ADMIN — LORAZEPAM 1 MG: 2 INJECTION INTRAMUSCULAR; INTRAVENOUS at 18:59

## 2019-12-11 RX ADMIN — POTASSIUM CHLORIDE 40 MEQ: 20 TABLET, EXTENDED RELEASE ORAL at 21:28

## 2019-12-11 RX ADMIN — SUCRALFATE 1 G: 1 TABLET ORAL at 17:58

## 2019-12-11 RX ADMIN — DOCUSATE SODIUM 100 MG: 100 CAPSULE, LIQUID FILLED ORAL at 17:58

## 2019-12-11 RX ADMIN — SODIUM CHLORIDE 1000 ML: 0.9 INJECTION, SOLUTION INTRAVENOUS at 12:28

## 2019-12-11 RX ADMIN — FOLIC ACID: 5 INJECTION, SOLUTION INTRAMUSCULAR; INTRAVENOUS; SUBCUTANEOUS at 15:36

## 2019-12-11 RX ADMIN — NICOTINE 1 PATCH: 14 PATCH, EXTENDED RELEASE TRANSDERMAL at 16:13

## 2019-12-11 RX ADMIN — LAMOTRIGINE 25 MG: 25 TABLET ORAL at 21:28

## 2019-12-11 RX ADMIN — SUCRALFATE 1 G: 1 TABLET ORAL at 21:29

## 2019-12-11 RX ADMIN — ACETYLCYSTEINE 12460 MG: 6 INJECTION, SOLUTION INTRAVENOUS at 14:14

## 2019-12-11 RX ADMIN — LORAZEPAM 1 MG: 2 INJECTION INTRAMUSCULAR; INTRAVENOUS at 13:54

## 2019-12-11 RX ADMIN — TRAZODONE HYDROCHLORIDE 150 MG: 100 TABLET ORAL at 21:29

## 2019-12-11 NOTE — H&P
H&P- Carolyn Goel 1965, 48 y o  female MRN: [id number]    Unit/Bed#:  Encounter: [id number]    Primary Care Provider: YONG Begum   Date and time admitted to hospital: 12/11/2019 11:47 AM        * Intentional acetaminophen overdose ([facility] )  Assessment & Plan  As per patient, she took handful of Tylenol p m  Tablets last night around 11:00 p m  She also took 4 beers  She denies taking any other medication or additional alcohol  This morning she woke up with abdominal pain and vomiting  She decided to come to ER  In the ER, acetaminophen level was 15  Elevated LFTs were noted  Ethanol level was elevated as well   was consulted from the ER who recommended to start  acetyl cystine and keep her under observation  EKG shows normal sinus rhythm  CXR negative for effusion   Will start telemetry monitoring  Patient was started on acetylcysteine in the ER    Will continue as per protocol  Monitor LFT, BMP on acetaminophen level every 12 hours  Will consult Psychiatry  One to one monitoring  Will keep patient NPO due to ongoing nausea  Zofran as needed      Alcohol abuse  Assessment & Plan  Elevated ETOH level on admission  CIWA protocol  Will monitor for withdrawal symptoms  Thiamin and folic acid supplement  Ativan as needed for agitation    Transaminitis  Assessment & Plan  Secondary to acute acetaminophen overdose vs  Alcohol abuse  Will continue to trend    Tobacco abuse  Assessment & Plan  Smoking cessation counseling provided to patient, initiate nicotine patch    Hypercholesteremia  Assessment & Plan  Will hold atorvastatin due to elevated LFTs    Bipolar I disorder, most recent episode depressed, severe without psychotic features ([facility] 75 )  Assessment & Plan  Continue home medication Latuda, Effexor and trazodone  Will consult Psychiatry  Will need inpatient psychiatry after discharge    h/o Seizure [facility])  Assessment & Plan  Continue home medication lamotrigine  Will monitor for alcohol withdrawal  Ativan as needed    Acquired hypothyroidism  Assessment & Plan  Normal TSH noted on admission  Continue home medication levothyroxine 50 mcg    Nausea & vomiting  Assessment & Plan  Secondary to acetaminophen overdose   Continue IV fluid  NPO  Zofran as needed  Trend LFTs    Essential hypertension  Assessment & Plan  I reviewed last hospital course  Patient needed multiple antihypertensive medication to achieve blood pressure control  Continue amlodipine 5 mg, hydrochlorothiazide 25 mg, lisinopril 20 mg, Lopressor 50 mg and clonidine 0 1 mg twice a day  Hold medication if systolic blood pressures less than 110                VTE Prophylaxis: Enoxaparin (Lovenox)  / sequential compression device   Code Status: full     Anticipated Length of Stay:  Patient will be admitted on an Observation basis with an anticipated length of stay of  > 2 midnights  Justification for Hospital Stay: intentional overdose    Total Time for Visit, including Counseling / Coordination of Care: 1 hour  Greater than 50% of this total time spent on direct patient counseling and coordination of care  Chief Complaint:       History of Present Illness:    Silver Orozco is a 48 y o  female with history of bipolar disorder , depression , previous history of suicidal attempt and overdose , alcoholism presents today via EMS for intentional acetaminophen overdose  Patient states she took handful of Tylenol p m  Last night around 11:00 p m  In order to commit suicide  She also drank alcohol, admits to 4 beers last night only  However this morning she woke up feeling terrible, abdominal pain, nausea and vomiting  Therefore she decided to call 911 to seek care  In ER, vitals were stable  EKG shows normal sinus rhythm  Urine drug screen was negative  Acetaminophen level was 15 however ethanol level was 169  Her liver function test was abnormal in the ER as well    Toxicology was consulted who recommended patient to start on N acetylcysteine and admit her for monitoring  Patient had multiple episodes of vomiting at home however no further episode in the ER  Patient continues to be nauseous in the ER  Denies headaches are chest pain or short of breath  Patient was admitted in [address] [place] [address] from 11/14/2012-11/20/2019 for depression and suicidal ideation as well  Her medications were adjusted on discharge  Patient states she is compliant with her medications however her depression continue to worsen  Review of Systems:    Review of Systems   Constitutional: Positive for appetite change  Negative for activity change and fever  HENT: Negative for congestion and sore throat  Eyes: Negative for pain and visual disturbance  Respiratory: Negative for cough, shortness of breath and wheezing  Cardiovascular: Negative for chest pain, palpitations and leg swelling  Gastrointestinal: Positive for abdominal pain, nausea and vomiting  Negative for abdominal distention  Endocrine: Negative for polyuria  Genitourinary: Negative for difficulty urinating and dysuria  Musculoskeletal: Negative for arthralgias and back pain  Skin: Negative for rash and wound  Allergic/Immunologic: Negative for immunocompromised state  Neurological: Negative for dizziness, speech difficulty and headaches  Hematological: Negative for adenopathy  Psychiatric/Behavioral: Positive for sleep disturbance and suicidal ideas  The patient is nervous/anxious  The patient is not hyperactive           Depressed, flat affect       Past Medical and Surgical History:     Past Medical History:   Diagnosis Date    [facility]     Anxiety     Bipolar disorder (HCC)     Bowel obstruction (HCC)     Gastritis     Head injury     Heart palpitations     Hyperlipidemia     Hypertension     Hypothyroidism     PTSD (post-traumatic stress disorder)     [facility]ure [facility] )     Suicide attempt [facility])        Past Surgical History: Procedure Laterality Date    ABDOMINAL SURGERY      APPENDECTOMY      BOWEL RESECTION      CHOLECYSTECTOMY      ESOPHAGOGASTRODUODENOSCOPY N/A 5/18/2018    Procedure: ESOPHAGOGASTRODUODENOSCOPY (EGD) with bx;  Surgeon: Romana Barth, DO;  Location: [facility]; Service: Gastroenterology    HYSTERECTOMY      KNEE SURGERY Bilateral        Meds/Allergies:    Prior to Admission medications    Medication Sig Start Date End Date Taking?  Authorizing Provider   amLODIPine (NORVASC) 5 mg tablet Take 1 tablet (5 mg total) by mouth daily 11/19/19   YONG Davis   atorvastatin (LIPITOR) 40 mg tablet Take 1 tablet (40 mg total) by mouth daily with dinner 11/19/19   YONG Davis   cloNIDine (CATAPRES) 0 1 mg tablet Take 1 tablet (0 1 mg total) by mouth 2 (two) times a day 11/19/19   YONG Davis   folic acid (FOLVITE) 1 mg tablet Take 1 tablet (1 mg total) by mouth daily 11/19/19   YONG Davis   gabapentin (NEURONTIN) 400 mg capsule Take 1 capsule (400 mg total) by mouth 3 (three) times a day 11/19/19   YONG Davis   hydrochlorothiazide (HYDRODIURIL) 25 mg tablet Take 1 tablet (25 mg total) by mouth daily 11/19/19   YONG Davis   hydrOXYzine HCL (ATARAX) 25 mg tablet Take 1 tablet (25 mg total) by mouth every 6 (six) hours as needed (mild anxiety) 11/20/19   YONG Davis   lamoTRIgine (LaMICtal) 25 mg tablet TAKE 1 TABLET BY MOUTH DAILY AT BEDTIME 11/20/19   YONG Davis   levothyroxine 50 mcg tablet Take 1 tablet (50 mcg total) by mouth daily 11/19/19   YONG Davis   lisinopril (ZESTRIL) 20 mg tablet Take 1 tablet (20 mg total) by mouth daily 11/19/19   YONG Davis   lurasidone (LATUDA) 80 mg tablet Take 1 tablet (80 mg total) by mouth daily with dinner 11/19/19   YONG Davis   melatonin 3 mg Take 1 tablet (3 mg total) by mouth daily at bedtime 11/19/19   YONG Davis metoprolol tartrate (LOPRESSOR) 50 mg tablet Take 1 tablet (50 mg total) by mouth every 12 (twelve) hours 11/19/19   YONG Davis   pantoprazole (PROTONIX) 40 mg tablet Take 1 tablet (40 mg total) by mouth daily in the early morning 11/19/19   YONG Davis   sucralfate (CARAFATE) 1 g tablet Take 1 tablet (1 g total) by mouth 4 (four) times a day (before meals and at bedtime) 11/19/19   YONG Davis   thiamine 100 MG tablet Take 1 tablet (100 mg total) by mouth daily 11/19/19   YONG Davis   traZODone (DESYREL) 150 mg tablet Take 1 tablet (150 mg total) by mouth daily at bedtime 11/19/19   YONG Davis   venlafaxine (EFFEXOR-XR) 150 mg 24 hr capsule Take 1 capsule (150 mg total) by mouth daily 11/20/19   YONG Davis     I have reviewed home medications with patient personally  Allergies: Allergies   Allergen Reactions    Latex Rash       Social History:     Marital Status:    Occupation: unemployed  Patient Pre-hospital Living Situation: home with son  Patient Pre-hospital Level of Mobility: active  Patient Pre-hospital Diet Restrictions: none  Substance Use History:   Social History     Substance and Sexual Activity   Alcohol Use Yes    Alcohol/week: 21 0 standard drinks    Types: 21 Cans of beer per week    Frequency: 2-3 times a week    Drinks per session: 7 to 9    Binge frequency: Daily or almost daily    Comment: As of 11/14, reports 7 large beers about 2-3 times a week       Social History     Tobacco Use   Smoking Status Current Every Day Smoker    Packs/day: 0 50    Years: 25 00    Pack years: 12 50    Types: Cigarettes   Smokeless Tobacco Never Used     Social History     Substance and Sexual Activity   Drug Use No       Family History:    Family History   Problem Relation Age of Onset    Diabetes Father        Physical Exam:     Vitals:   Blood Pressure: 140/90 (12/11/19 1415)  Pulse: 91 (12/11/19 1415)  Temperature: 97 7 °F (36 5 °C) (12/11/19 1152)  Temp Source: Tympanic (12/11/19 1152)  Respirations: 16 (12/11/19 1415)  Weight - Scale: 83 kg (182 lb 15 7 oz) (12/11/19 1152)  SpO2: 97 % (12/11/19 1415)    Physical Exam   Constitutional: She appears well-developed and well-nourished  HENT:   Head: Normocephalic and atraumatic  Right Ear: External ear normal    Left Ear: External ear normal    Nose: Nose normal    Mouth/Throat: Oropharynx is clear and moist    Eyes: Conjunctivae and EOM are normal    Neck: Normal range of motion  Neck supple  Cardiovascular: Normal rate, regular rhythm and normal heart sounds  Pulmonary/Chest: Effort normal and breath sounds normal    Abdominal: Soft  Bowel sounds are normal    Genitourinary:   Genitourinary Comments: deferred   Neurological: She is alert  Cranial nerve 2 -12 are normal    Skin: Skin is warm and dry  Psychiatric: Her speech is normal  Her mood appears anxious  She is slowed  Additional Data:     Lab Results: I have personally reviewed pertinent reports  Results from last 7 days   Lab Units 12/11/19  1223   WBC Thousand/uL 9 50   HEMOGLOBIN g/dL 16 1*   HEMATOCRIT % 46 7*   PLATELETS Thousands/uL 371   NEUTROS PCT % 62   LYMPHS PCT % 27   MONOS PCT % 10   EOS PCT % 0     Results from last 7 days   Lab Units 12/11/19  1222   SODIUM mmol/L 137   POTASSIUM mmol/L 4 2   CHLORIDE mmol/L 103   CO2 mmol/L 21*   BUN mg/dL 22   CREATININE mg/dL 0 76   ANION GAP mmol/L 13   CALCIUM mg/dL 8 9   ALBUMIN g/dL 4 3   TOTAL BILIRUBIN mg/dL 0 90   ALK PHOS U/L 86   ALT U/L 132*   AST U/L 119*   GLUCOSE RANDOM mg/dL 173*                       Imaging: I have personally reviewed pertinent reports        XR chest 1 view portable    (Results Pending)       EKG, Pathology, and Other Studies Reviewed on Admission:   · EKG: reviewed    Allscripts / Epic Records Reviewed: Yes     ** Please Note: This note has been constructed using a voice recognition system   **

## 2019-12-11 NOTE — NURSING NOTE
Pt  Received to [facility] from [facility] at 15:15 in [facility]  Is  As per admission assessment  Nauseated, taking ice chips  Denied suicidal ideation upon arrival  Currently resting

## 2019-12-11 NOTE — NURSING NOTE
Pt incontinent of small amount of urine  Assisted to commode and unable to void further though she still has the urge  Has attempted x3 to void on commode since arrival in ICU  Current bladder scan reveals 194ml retained urine

## 2019-12-11 NOTE — ASSESSMENT & PLAN NOTE
Elevated ETOH level on admission  WA protocol  Will monitor for withdrawal symptoms  Thiamin and folic acid supplement  Ativan as needed for agitation

## 2019-12-11 NOTE — ED PROVIDER NOTES
History  Chief Complaint   Patient presents with    Suicide Attempt     took approximetly 10 tylenol PM with alcohol last night in an attempt to harm herself "i didnt want to wake up" pt has nausea and abdominal discomfort today     47 yo morbidly obese female with a history of bipolar disorder, depression, alcoholism, HTN, hyperlipidemia, and a seizure disorder brought in by EMS for evaluation of an intentional Tylenol overdose  The patient states she took "about 10" Tylenol PM pills last night around 2300 in an attempt to kill herself  She also admits to "a lot" of alcohol intake last night  This morning when she woke up she was experiencing upper abdominal pain, nausea, and vomiting so she called 9-1-1  No diarrhea  She denies fevers/chills  She still admits to SI  No chest pain or shortness of breath  No other co-ingestions  No other specific complaints  Prior to Admission Medications   Prescriptions Last Dose Informant Patient Reported? Taking?    amLODIPine (NORVASC) 5 mg tablet   No No   Sig: Take 1 tablet (5 mg total) by mouth daily   atorvastatin (LIPITOR) 40 mg tablet   No No   Sig: Take 1 tablet (40 mg total) by mouth daily with dinner   cloNIDine (CATAPRES) 0 1 mg tablet   No No   Sig: Take 1 tablet (0 1 mg total) by mouth 2 (two) times a day   folic acid (FOLVITE) 1 mg tablet   No No   Sig: Take 1 tablet (1 mg total) by mouth daily   gabapentin (NEURONTIN) 400 mg capsule   No No   Sig: Take 1 capsule (400 mg total) by mouth 3 (three) times a day   hydrOXYzine HCL (ATARAX) 25 mg tablet   No No   Sig: Take 1 tablet (25 mg total) by mouth every 6 (six) hours as needed (mild anxiety)   hydrochlorothiazide (HYDRODIURIL) 25 mg tablet   No No   Sig: Take 1 tablet (25 mg total) by mouth daily   lamoTRIgine (LaMICtal) 25 mg tablet   No No   Sig: TAKE 1 TABLET BY MOUTH DAILY AT BEDTIME   levothyroxine 50 mcg tablet   No No   Sig: Take 1 tablet (50 mcg total) by mouth daily   lisinopril (ZESTRIL) 20 mg tablet   No No   Sig: Take 1 tablet (20 mg total) by mouth daily   lurasidone (LATUDA) 80 mg tablet   No No   Sig: Take 1 tablet (80 mg total) by mouth daily with dinner   melatonin 3 mg   No No   Sig: Take 1 tablet (3 mg total) by mouth daily at bedtime   metoprolol tartrate (LOPRESSOR) 50 mg tablet   No No   Sig: Take 1 tablet (50 mg total) by mouth every 12 (twelve) hours   pantoprazole (PROTONIX) 40 mg tablet   No No   Sig: Take 1 tablet (40 mg total) by mouth daily in the early morning   sucralfate (CARAFATE) 1 g tablet   No No   Sig: Take 1 tablet (1 g total) by mouth 4 (four) times a day (before meals and at bedtime)   thiamine 100 MG tablet   No No   Sig: Take 1 tablet (100 mg total) by mouth daily   traZODone (DESYREL) 150 mg tablet   No No   Sig: Take 1 tablet (150 mg total) by mouth daily at bedtime   venlafaxine (EFFEXOR-XR) 150 mg 24 hr capsule   No No   Sig: Take 1 capsule (150 mg total) by mouth daily      Facility-Administered Medications: None       Past Medical History:   Diagnosis Date    Alcoholism ([facility] )     Anxiety     Bipolar disorder (HCC)     Bowel obstruction (HCC)     Gastritis     Head injury     Heart palpitations     Hyperlipidemia     Hypertension     Hypothyroidism     PTSD (post-traumatic stress disorder)     Seizure ([facility] )     [facility] attempt [facility])        Past Surgical History:   Procedure Laterality Date    ABDOMINAL SURGERY      APPENDECTOMY      BOWEL RESECTION      CHOLECYSTECTOMY      ESOPHAGOGASTRODUODENOSCOPY N/A 5/18/2018    Procedure: ESOPHAGOGASTRODUODENOSCOPY (EGD) with bx;  Surgeon: Dayna Mendez DO;  Location: [facility]; Service: Gastroenterology    HYSTERECTOMY      KNEE SURGERY Bilateral        Family History   Problem Relation Age of Onset    Diabetes Father      I have reviewed and agree with the history as documented      Social History     Tobacco Use    Smoking status: Current Every Day Smoker     Packs/day: 0 50     Years: 25 00     Pack years: 12 50     Types: Cigarettes    Smokeless tobacco: Never Used   Substance Use Topics    Alcohol use: Yes     Alcohol/week: 21 0 standard drinks     Types: 21 Cans of beer per week     Frequency: 2-3 times a week     Drinks per session: 7 to 9     Binge frequency: Daily or almost daily     Comment: As of 11/14, reports 7 large beers about 2-3 times a week   Drug use: No        Review of Systems   Constitutional: Negative for chills and fever  HENT: Negative for sore throat  Eyes: Negative for visual disturbance  Respiratory: Negative for shortness of breath  Cardiovascular: Negative for chest pain  Gastrointestinal: Positive for abdominal pain and nausea  Negative for abdominal distention, anal bleeding, diarrhea and vomiting  Endocrine: Negative for cold intolerance and heat intolerance  Genitourinary: Negative for dysuria and frequency  Musculoskeletal: Negative for back pain  Skin: Negative for rash  Allergic/Immunologic: Negative for immunocompromised state  Neurological: Negative for weakness and numbness  Hematological: Negative for adenopathy  Psychiatric/Behavioral: Positive for dysphoric mood, self-injury and suicidal ideas  The patient is nervous/anxious  Physical Exam  Physical Exam   Constitutional: She is oriented to person, place, and time  She appears well-developed and well-nourished  No distress  HENT:   Head: Normocephalic and atraumatic  Eyes: Pupils are equal, round, and reactive to light  EOM are normal    Neck: Normal range of motion  Neck supple  Cardiovascular: Normal rate and regular rhythm  Pulmonary/Chest: Effort normal and breath sounds normal    Abdominal: Soft  She exhibits no distension  There is tenderness in the right upper quadrant, epigastric area and left upper quadrant  Musculoskeletal: Normal range of motion  She exhibits no edema  Neurological: She is alert and oriented to person, place, and time     Skin: Skin is warm and dry    Psychiatric: Her mood appears anxious  She is slowed  She expresses suicidal ideation  She expresses no homicidal ideation  She expresses suicidal plans  She expresses no homicidal plans  Vital Signs  ED Triage Vitals [12/11/19 1152]   Temperature Pulse Respirations Blood Pressure SpO2   97 7 °F (36 5 °C) (!) 123 20 153/90 96 %      Temp Source Heart Rate Source Patient Position - Orthostatic VS BP Location FiO2 (%)   Tympanic Monitor Sitting Left arm --      Pain Score       Worst Possible Pain           Vitals:    12/11/19 1152 12/11/19 1340   BP: 153/90 135/73   Pulse: (!) 123 91   Patient Position - Orthostatic VS: Sitting          Visual Acuity      ED Medications  Medications   acetylcysteine (ACETADOTE) 12,460 mg in dextrose 5 % 200 mL IVPB (has no administration in time range)   sodium chloride 0 9 % bolus 1,000 mL (0 mL Intravenous Stopped 12/11/19 1339)   ondansetron (ZOFRAN) injection 4 mg (4 mg Intravenous Given 12/11/19 1228)   LORazepam (ATIVAN) 2 mg/mL injection 1 mg (1 mg Intravenous Given 12/11/19 1354)       Diagnostic Studies  Results Reviewed     Procedure Component Value Units Date/Time    TSH [[id number]]  (Normal) Collected:  12/11/19 1222    Lab Status:  Final result Specimen:  Blood from Arm, Left Updated:  12/11/19 1327     TSH 3RD GENERATON 1 050 uIU/mL     Narrative:       Patients undergoing fluorescein dye angiography may retain small amounts of fluorescein in the body for 48-72 hours post procedure  Samples containing fluorescein can produce falsely depressed TSH values  If the patient had this procedure,a specimen should be resubmitted post fluorescein clearance        Urine Microscopic [[id number]]  (Abnormal) Collected:  12/11/19 1222    Lab Status:  Final result Specimen:  Urine, Clean Catch Updated:  12/11/19 1308     RBC, UA None Seen /hpf      WBC, UA 1-2 /hpf      Epithelial Cells Moderate /hpf      Bacteria, UA Occasional /hpf      Hyaline Casts, UA 4-10 /lpf Fine granular casts 5-10 /lpf      MUCUS THREADS Occasional    Rapid drug screen, urine [[id number]]  (Normal) Collected:  12/11/19 1223    Lab Status:  Final result Specimen:  Urine, Clean Catch Updated:  12/11/19 1308     Amph/Meth UR Negative     Barbiturate Ur Negative     Benzodiazepine Urine Negative     Cocaine Urine Negative     Methadone Urine Negative     Opiate Urine Negative     PCP Ur Negative     THC Urine Negative    Narrative:       FOR MEDICAL PURPOSES ONLY  IF CONFIRMATION NEEDED PLEASE CONTACT THE LAB WITHIN 5 DAYS      Drug Screen Cutoff Levels:  AMPHETAMINE/METHAMPHETAMINES  1000 ng/mL  BARBITURATES     200 ng/mL  BENZODIAZEPINES     200 ng/mL  COCAINE      300 ng/mL  METHADONE      300 ng/mL  OPIATES      300 ng/mL  PHENCYCLIDINE     25 ng/mL  THC       50 ng/mL      Salicylate level [[id number]]  (Abnormal) Collected:  12/11/19 1222    Lab Status:  Final result Specimen:  Blood from Arm, Left Updated:  77/26/68 3714     Salicylate Lvl <8 5 mg/dL     Narrative:       Hemolysis    Acetaminophen level-"If concentration is detectable, please discuss with medical  on call " [[id number]]  (Normal) Collected:  12/11/19 1222    Lab Status:  Final result Specimen:  Blood from Arm, Left Updated:  12/11/19 1257     Acetaminophen Level 15 ug/mL     Narrative:       Hemolysis    Ethanol [[id number]]  (Abnormal) Collected:  12/11/19 1222    Lab Status:  Final result Specimen:  Blood from Arm, Left Updated:  12/11/19 1257     Ethanol Lvl 169 mg/dL     Lipase [[id number]]  (Normal) Collected:  12/11/19 1222    Lab Status:  Final result Specimen:  Blood from Arm, Left Updated:  12/11/19 1257     Lipase 150 u/L     Narrative:       Hemolysis    Comprehensive metabolic panel [[id number]]  (Abnormal) Collected:  12/11/19 1222    Lab Status:  Final result Specimen:  Blood from Arm, Left Updated:  12/11/19 1257     Sodium 137 mmol/L      Potassium 4 2 mmol/L      Chloride 103 mmol/L      CO2 21 mmol/L ANION GAP 13 mmol/L      BUN 22 mg/dL      Creatinine 0 76 mg/dL      Glucose 173 mg/dL      Calcium 8 9 mg/dL       U/L       U/L      Alkaline Phosphatase 86 U/L      Total Protein 7 6 g/dL      Albumin 4 3 g/dL      Total Bilirubin 0 90 mg/dL      eGFR 90 ml/min/1 73sq m     Narrative:       Hemolysis  National Kid[employer] guidelines for Chronic Kidney Disease (CKD):     Stage 1 with normal or high GFR (GFR > 90 mL/min/1 73 square meters)    Stage 2 Mild CKD (GFR = 60-89 mL/min/1 73 square meters)    Stage 3A Moderate CKD (GFR = 45-59 mL/min/1 73 square meters)    Stage 3B Moderate CKD (GFR = 30-44 mL/min/1 73 square meters)    Stage 4 Severe CKD (GFR = 15-29 mL/min/1 73 square meters)    Stage 5 End Stage CKD (GFR <15 mL/min/1 73 square meters)  Note: GFR calculation is accurate only with a steady state creatinine    UA (URINE) with reflex to Scope [[id number]]  (Abnormal) Collected:  12/11/19 1222    Lab Status:  Final result Specimen:  Urine, Clean Catch Updated:  12/11/19 1252     Color, UA Kasandra     Clarity, UA Clear     Specific [place], UA 1 030     pH, UA 5 0     Leukocytes, UA Negative     Nitrite, UA Negative     Protein, UA 15 (Trace) mg/dl      Glucose, UA Negative mg/dl      Ketones, UA Negative mg/dl      Bilirubin, UA Negative     Blood, UA Negative     UROBILINOGEN UA Negative mg/dL     CBC and differential [[id number]]  (Abnormal) Collected:  12/11/19 1223    Lab Status:  Final result Specimen:  Blood from Arm, Left Updated:  12/11/19 1250     WBC 9 50 Thousand/uL      RBC 4 89 Million/uL      Hemoglobin 16 1 g/dL      Hematocrit 46 7 %      MCV 95 fL      MCH 33 0 pg      MCHC 34 6 g/dL      RDW 14 2 %      MPV 7 3 fL      Platelets 303 Thousands/uL      Neutrophils Relative 62 %      Lymphocytes Relative 27 %      Monocytes Relative 10 %      Eosinophils Relative 0 %      Basophils Relative 1 %      Neutrophils Absolute 5 90 Thousands/µL      Lymphocytes Absolute 2 60 Thousands/µL      Monocytes Absolute 0 90 Thousand/µL      Eosinophils Absolute 0 00 Thousand/µL      Basophils Absolute 0 10 Thousands/µL     POCT pregnancy, urine [[id number]]  (Normal) Resulted:  12/11/19 1215    Lab Status:  Final result Updated:  12/11/19 1215     EXT PREG TEST UR (Ref: Negative) negative     Control valid                 XR chest 1 view portable    (Results Pending)              Procedures  ECG 12 Lead Documentation Only  Date/Time: 12/11/2019 1:49 PM  Performed by: Sandi Bonner MD  Authorized by: Sandi Bonner MD     Indications / Diagnosis:  Overdose  ECG reviewed by me, the ED Provider: no    Patient location:  ED  Interpretation:     Interpretation: non-specific    Rate:     ECG rate:  99 bpm    ECG rate assessment: normal    Rhythm:     Rhythm: sinus rhythm    Ectopy:     Ectopy: none    Conduction:     Conduction: normal    ST segments:     ST segments:  Normal  T waves:     T waves: non-specific    Other findings:     Other findings: LAE               ED Course  ED Course as of Dec 11 1408   Wed Dec 11, 2019   1258 ACETAMINOPHEN LEVEL: 15                               MDM  Number of Diagnoses or Management Options  Intentional acetaminophen overdose, initial encounter [facility]):   Suicide attempt [facility]):   Diagnosis management comments: The patient is mildly intoxicated and actively vomiting on arrival  (+) Tylenol overdose last night  Will check EKG, CXR, basic labs, lipase, LFTs, and APAP/ASA levels  1:1 initiated, will continue to monitor in the ED     1310 APAP level is 15  Case discussed with Toxicology  Consultant recommended starting NAC and admitting to the hospital  SLIM attending paged         Amount and/or Complexity of Data Reviewed  Clinical lab tests: ordered and reviewed  Tests in the radiology section of CPT®: ordered and reviewed  Tests in the medicine section of CPT®: ordered and reviewed    Risk of Complications, Morbidity, and/or Mortality  Presenting problems: high  Management options: high    Patient Progress  Patient progress: stable        Disposition  Final diagnoses:   Suicide attempt [facility])   Intentional acetaminophen overdose, initial encounter [facility])     Time reflects when diagnosis was documented in both MDM as applicable and the Disposition within this note     Time User Action Codes Description Comment    12/11/2019  1:32 PM S[place] Suicide attempt ([address] )     12/11/2019  1:32 PM Sonia Silvano Resendiz Add [T39 1X2A] Intentional acetaminophen overdose, initial encounter [facility])       ED Disposition     ED Disposition Condition Date/Time Comment    Admit Stable Wed Dec 11, 2019  1:32 PM Case was discussed with Dr Kenya Ruiz and the patient's admission status was agreed to be Admission Status: observation status to the service of Dr Kenya Ruiz   Follow-up Information    None         Patient's Medications   Discharge Prescriptions    No medications on file     No discharge procedures on file      ED Provider  Electronically Signed by           Mejia Lynch MD  12/11/19 7145

## 2019-12-11 NOTE — ASSESSMENT & PLAN NOTE
As per patient, she took handful of Tylenol p m  Tablets last night around 11:00 p m  She also took 4 beers  She denies taking any other medication or additional alcohol  This morning she woke up with abdominal pain and vomiting  She decided to come to ER  In the ER, acetaminophen level was 15  Elevated LFTs were noted  Ethanol level was elevated as well   was consulted from the ER who recommended to start  acetyl cystine and keep her under observation  EKG shows normal sinus rhythm  CXR negative for effusion   Will start telemetry monitoring  Patient was started on acetylcysteine in the ER    Will continue as per protocol  Monitor LFT, BMP on acetaminophen level every 12 hours  Will consult Psychiatry  One to one monitoring  Will keep patient NPO due to ongoing nausea  Zofran as needed

## 2019-12-11 NOTE — ED NOTES
PT is appears to be sad, with flat affect  Admits to wanting to hurt herself last night by taking Tylenol PM  Unable to remember how many she took when questioned although she estimated taking approx 10 pills when EMS arrived to her house  Pt also states that she did ask her son to bring her in which he refused stating that our hospital was too far to bring her in  Pt admits to taking behavioral medication, especially she was discharged in November with some medication changes, and although she is taking them, she feels they are not working at all for her    The entire process was explained to her and pt agrees to be safe while in the hospital        Tl Urbina RN  12/11/19 3673

## 2019-12-11 NOTE — ASSESSMENT & PLAN NOTE
I reviewed last hospital course  Patient needed multiple antihypertensive medication to achieve blood pressure control    Continue amlodipine 5 mg, hydrochlorothiazide 25 mg, lisinopril 20 mg, Lopressor 50 mg and clonidine 0 1 mg twice a day  Hold medication if systolic blood pressures less than 110

## 2019-12-11 NOTE — ASSESSMENT & PLAN NOTE
Continue home medication Latuda, Effexor and trazodone  Will consult Psychiatry  Will need inpatient psychiatry after discharge

## 2019-12-11 NOTE — CONSULTS
Consultation - Medical Toxicology  Jazmin Harrington 48 y o  female MRN: [id number]  Unit/Bed#: ED 07 Encounter: [id number]     Reason for Consult / Principal Problem: overdose  Inpatient consult to Toxicology  Consult performed by: Camille Pike MD  Consult ordered by: Buster Gonzalez MD        12/11/19     ASSESSMENT:  1  APAP overdose  2  ETOH intoxication  3  Anion Gap Metabolic Acidosis  4  Elevated LFTs  5  Depression  6  Suicidal Ideation    RECOMMENDATIONS:    If the patient had an acute single ingestion at 10 PM, using the [facility] normogram, she would fall under the treatment line and have an unlikely risk for developing hepatotoxicity; however, patient was inebriated at the time and is unsure of the timeline of events  It is likely that her elevated transaminases stem from her ETOH toxicity, however, due to her APAP ingestion, the picture becomes more clinically complicated  Given patient's unclear timeline and elevated LFTs in the setting of detected acetaminophen, it is prudent to admit the patient and start her on NAC  1   Serial BMP, LFTs, and APAP q12    2  Start NAC  - oral preferred due to its first pass metabolism, lower expense, and decreased risk of allergic/anaphylactic reactions    3  NAC can be stopped when APAP is undetectable and LFTs are downtrending      4  Monitor for ETOH withdrawal    5  As with all overdoses, please involve psychiatry/social work      For further questions, please call [facility] or [facility] [employer] to reach the medical  on call  Please see additional teaching note below (if available)    Medical Toxicology Note  [facility]  Acetaminophen Toxicity  Last revised October 2017     Acetaminophen (Tylenol) is a nonopiod analgesic and antipyretic medication found in many over-the-counter and prescription products such as Tylenol PM, Norco, Percocet, Nyquil, Vicks Formula 44-D   The recommended maximum daily dose of acetaminophen for adults is 3g/day, and 75-90mg/kg/day for children  Alcoholics may safely take Tylenol in therapeutic doses, but they may be at increased risk for hepatotoxicity in overdose  Mechanism of Toxicity: Acetaminophen is primarily metabolized by the liver  In therapeutic doses, about 90% of acetaminophen is conjugated to nontoxic metabolites (glucoronides and sulfates)  A small portion (<5%) is conjugated by cytochrome P450 enzyme, subunit CYP2E1, to a toxic metabolite, N-acetyl-p-benzoquinoneimine (NAPQI)  This metabolite is further conjugated by glutathione, to nontoxic metabolites eliminated by the kidneys  Liver Injury:  In toxic doses, the usual metabolic pathways are overwhelmed; acetaminophen is shunted to the cytochrome P450 pathway, creating NAPQI  Glutathione stores are depleted and NAPQI is produced  Cellular injury and hepatic necrosis may occur as NAPQI accumulates  Renal Injury:  Cytochrome P450 activity in the kidneys is thought to cause direct renal damage  Renal insufficiency may also develop during fulminant hepatic failure due to hepatorenal syndrome  Renal toxicity is usually associated with liver injury  Pharmacokinetics:  Acetaminophen is rapidly absorbed  Peak levels occur within  minutes with normal doses  Delayed absorption may occur with sustained release products or with co-ingestions that slow the GI tract (opiods, anticholinergics)  The elimination half-life is 1-3 hours after therapeutic doses and may extend to 12 hours after overdose  Toxic Dose:  Toxicity in adults may occur with acute ingestions of 7g, and 200mg/kg in children  Hepatic injury following chronic ingestions may occur at any dose above the daily recommended dose  Clinical Presentation:    Acute Ingestion: Within 8 hrs of an acute ingestion, there are usually few symptoms  Between 8-30 hours after a toxic, acute ingestion, a transaminitis will develop   Nausea, vomiting, and right upper quadrant pain may occur  Within 12-36 hours, worsening AST/ALT develops with elevated bilirubin and INR  The most severe cases will develop fulminant liver failure with hepatic encephalopathy and acidosis, usually within 3-7 days post overdose  The patient should be evaluated for a liver transplantation  Repeated Supra-therapeutic Ingestion: Due to a sub-acute course, patients may present anywhere along a spectrum  normal LFTS to asymptomatic elevation of enzymes to hepatic failure  Diagnosis   Acute Ingestion (Time of Ingestion Known): After an acute ingestion at a known time, obtain a 4-hour post-ingestion serum acetaminophen level and plot the level on the Paulette-Brayans nomogram (see below)  This nomogram is used to predict the likelihood of hepatic toxicity based on the level of acetaminophen between 4 and 24 hours post-ingestion  The nomogram CANNOT be used if the time of ingestion is unknown  The dotted line (Rumack-Brayan line), marking a 4-hour level at 200 mcg/ml, is the original line developed from the study above which hepatic toxicity will probably occur  The solid line (Treatment Line), marking a 4-hour level at 150ug/ml  is the treatment line accepted as the standard of care in the [place] and is 25% lower as a safety margin  If the patients serum APAP level falls above the treatment line, start treatment with N-acetylcysteine (NAC)  (see Treatment below)           Acute Ingestion (Time of Ingestion Unknown) or Repeated Supra-therapeutic Ingestion An acetaminophen level CANNOT be plotted on the Rumack-Brayans nomogram  Draw an APAP level and AST/ALT at time of presentation  Anyone with an APAP level> 10mcg/ml OR elevated AST/ALT should start NAC  (see Treatment below)     TREATMENT   Emergency and Supportive Care: Treat nausea and vomiting to protect airway and support safe administration of charcoal and NAC, when indicated (see below)   Provide standard supportive care for liver and renal failure  Contact liver transplant team if fulminant hepatic failure occurs  Decontamination:  Administer activated charcoal within 2 hours of ingestion (consider later if extended release preparations)  Use antiemetics for nausea  Activated charcoal does bind to NAC, but the effect is not thought to be clinically significant  Gastric emptying is not recommended  Specific Drugs and Antidotes  Acute Ingestion Treat with NAC if the APAP level falls above the Treatment Line on the nomogram  The maximal benefit occurs if given within 8 hours of acute ingestion  Therefore, it is recommended to empirically start NAC before a level is obtained if there is a reasonable concern of a toxic ingestion presenting close to 8 hours or beyond  In late presenters (>8hrs), start NAC and treat for a full course or longer if LFTS remain abnormal  Treatment maybe stopped when AST/ALT peak and then downtrend, with an INR <2 and patient is clinically well  If abnormal labs persist, continue NAC and call Toxicology  There are two routes of administration for NAC, oral and IV  The treatment protocols are described below  Acute Ingestion (Time of Ingestion Unknown) or Repeated Supra-therapeutic Ingestion   The nomogram CANNOT be used to estimate the risk of hepatotoxicity  At presentation, check a serum APAP level and AST/ALT  If the APAP level is above 10 mcg/ml or the AST/ALT are elevated, start NAC treatment for 12 hours  If abnormalities persist, continue NAC treatment and call toxicology  If the APAP level is undetectable and AST and ALT are downtrending at the end of 12 hours, treatment may be stopped       Intravenous (Acetadote)   Loading dose- 150mg/kg infused over 15-60 minutes   Maintenance Infusion #1- 50mg/kg (12 5mg/kg/hr) over 4 hours   Maintenance Infusion #2 -100mg/kg (6 25 mg/kg/hr) until treatment endpoint   Treatment Endpoint: 20 hours or more   NAC should be continued for the full course  NAC can be stopped when APAP is undetectable, AST/ALT have peaked and are downtrending, and patient appears clinically well  Consultation with a medical  [address] [place] [address] is recommended before changes in the duration of therapy are made  Acetaminophen Toxicity Dos and Donts   Acute Ingestions   DO give charcoal for decontamination within 2 hours of ingestion if the patient can adequately protect their airway  DO start NAC empirically, i e  without an APAP level, if the ingestion is likely a large overdose presenting at 8 hours or more after ingestion  DO contact the Liver Transplant Team early if liver failure is developing  DO NOT get a level before 4hrs post-ingestion if the time of ingestion is certain in an acute overdose  DO NOT stop NAC therapy until full course is finished or truncated therapy is recommended by the [facility]  Repeated Supra-Therapeutic Ingestions (RSI)   DO ask patients with pain complaints (toothaches, back pain, cancer) about the amount of acetaminophen they use  DO NOT use the Paulette-Leonard nomogram to determine if the APAP level is toxic  DO NOT stop NAC therapy until full course is finished or truncated therapy is recommended by the [facility]  NAC Protocols   DO stop IV NAC if an anaphylactoid reaction occurs (rare)  Treat the reaction appropriately and call the [facility] for recommendations on continued NAC therapy  DO give charcoal with oral NAC when charcoal is indicated  References   Lauro RENNER Acetaminophen  In [facility], [place] [employer] EM, [address] [place] et al [facility]  Medical Toxicology 3rd edition  [place]: [address] [place], 2004: pp [phone]  Mamie Cliff BURTON Acetaminophen  In [place]     Hx and PE limited by: intoxication    HPI: Chema Sevilla is a 48y o  year old female with history of bipolar disorder, depression, ETOH dependency who presents with after an intentional overdose    History is limited due to patient's intoxication  Patient states that she took an "a lot" of acetaminophen "sometime last night, maybe 10PM" with "at least 6 beers "  Patient complains of abdominal pain, nausea, nonbilious nonbloody emesis, which prompted her ED visit  Admits to wanting to hurt herself  Otherwise, no complaints  Patient intermittently falls asleep/refuses to speak to the examiner during examination  Review of Systems   Unable to perform ROS: Mental status change   Gastrointestinal: Positive for abdominal pain, nausea and vomiting  Historical Information   Past Medical History:   Diagnosis Date    Alcoholism ([facility] )     Anxiety     Bipolar disorder (HCC)     Bowel obstruction (HCC)     Gastritis     Head injury     Heart palpitations     Hyperlipidemia     Hypertension     Hypothyroidism     PTSD (post-traumatic stress disorder)     Seizure ([facility] )     Suicide attempt [facility])      Past Surgical History:   Procedure Laterality Date    ABDOMINAL SURGERY      APPENDECTOMY      BOWEL RESECTION      CHOLECYSTECTOMY      ESOPHAGOGASTRODUODENOSCOPY N/A 5/18/2018    Procedure: ESOPHAGOGASTRODUODENOSCOPY (EGD) with bx;  Surgeon: Joseph Alas DO;  Location: [facility]; Service: Gastroenterology    HYSTERECTOMY      KNEE SURGERY Bilateral      Social History   Social History     Substance and Sexual Activity   Alcohol Use Yes    Alcohol/week: 21 0 standard drinks    Types: 21 Cans of beer per week    Frequency: 2-3 times a week    Drinks per session: 7 to 9    Binge frequency: Daily or almost daily    Comment: As of 11/14, reports 7 large beers about 2-3 times a week       Social History     Substance and Sexual Activity   Drug Use No     Social History     Tobacco Use   Smoking Status Current Every Day Smoker    Packs/day: 0 50    Years: 25 00    Pack years: 12 50    Types: Cigarettes   Smokeless Tobacco Never Used     Family History   Problem Relation Age of Onset    Diabetes Father      Prior to Admission medications    Medication Sig Start Date End Date Taking?  Authorizing Provider   amLODIPine (NORVASC) 5 mg tablet Take 1 tablet (5 mg total) by mouth daily 11/19/19   YONG Davis   atorvastatin (LIPITOR) 40 mg tablet Take 1 tablet (40 mg total) by mouth daily with dinner 11/19/19   YONG Davis   cloNIDine (CATAPRES) 0 1 mg tablet Take 1 tablet (0 1 mg total) by mouth 2 (two) times a day 11/19/19   YONG Davis   folic acid (FOLVITE) 1 mg tablet Take 1 tablet (1 mg total) by mouth daily 11/19/19   YONG Davis   gabapentin (NEURONTIN) 400 mg capsule Take 1 capsule (400 mg total) by mouth 3 (three) times a day 11/19/19   YONG Davis   hydrochlorothiazide (HYDRODIURIL) 25 mg tablet Take 1 tablet (25 mg total) by mouth daily 11/19/19   YONG Davis   hydrOXYzine HCL (ATARAX) 25 mg tablet Take 1 tablet (25 mg total) by mouth every 6 (six) hours as needed (mild anxiety) 11/20/19   YONG Davis   lamoTRIgine (LaMICtal) 25 mg tablet TAKE 1 TABLET BY MOUTH DAILY AT BEDTIME 11/20/19   YONG Davis   levothyroxine 50 mcg tablet Take 1 tablet (50 mcg total) by mouth daily 11/19/19   YONG Davis   lisinopril (ZESTRIL) 20 mg tablet Take 1 tablet (20 mg total) by mouth daily 11/19/19   YONG Davis   lurasidone (LATUDA) 80 mg tablet Take 1 tablet (80 mg total) by mouth daily with dinner 11/19/19   YONG Davis   melatonin 3 mg Take 1 tablet (3 mg total) by mouth daily at bedtime 11/19/19   YONG Davis   metoprolol tartrate (LOPRESSOR) 50 mg tablet Take 1 tablet (50 mg total) by mouth every 12 (twelve) hours 11/19/19   Yenny L Galilea, CRNP   pantoprazole (PROTONIX) 40 mg tablet Take 1 tablet (40 mg total) by mouth daily in the early morning 11/19/19   YONG Davis   sucralfate (CARAFATE) 1 g tablet Take 1 tablet (1 g total) by mouth 4 (four) times a day (before meals and at bedtime) 11/19/19   YONG Davis   thiamine 100 MG tablet Take 1 tablet (100 mg total) by mouth daily 11/19/19   YONG Davis   traZODone (DESYREL) 150 mg tablet Take 1 tablet (150 mg total) by mouth daily at bedtime 11/19/19   YONG Davis   venlafaxine (EFFEXOR-XR) 150 mg 24 hr capsule Take 1 capsule (150 mg total) by mouth daily 11/20/19   YONG Davis     Current Facility-Administered Medications   Medication Dose Route Frequency    acetylcysteine (ACETADOTE) 12,460 mg in dextrose 5 % 200 mL IVPB  150 mg/kg Intravenous Once     Allergies   Allergen Reactions    Latex Rash     Objective   No intake or output data in the 24 hours ending 12/11/19 1333  Invasive Devices:   Peripheral IV 12/11/19 Left Antecubital (Active)   Site Assessment Clean; Intact;Dry 12/11/2019 12:27 PM   Dressing Type Transparent 12/11/2019 12:27 PM   Line Status Blood return noted; Flushed; Infusing 12/11/2019 12:27 PM   Dressing Status Clean;Dry; Intact 12/11/2019 12:27 PM     Vitals   Vitals:    12/11/19 1152   BP: 153/90   TempSrc: Tympanic   Pulse: (!) 123   Resp: 20   Patient Position - Orthostatic VS: Sitting   Temp: 97 7 °F (36 5 °C)     Physical Exam  General Appearance: sleeping but easily rousible, smells strongly of ETOH, cooperative, skin warm, dry, and pink  Eyes: conjunctivae and corneas clear  3mm brisk PERRL, EOM's intact  sclerae normal   Ears: external inspection of ears show no abnormality  Nose: normal   Mouth: normal  mmm  Neck: Neck supple  Heart: normal rate and regular rhythm, no murmurs, clicks, or gallops  Lungs: clear to auscultation  Abdomen: BS normal  Abdomen soft, non-tender  No palpable bladder  No masses or organomegaly  Skin: Skin color, texture, turgor normal  No rashes or lesions  Axilla WNL  Neuro: Reflexes normal and symmetric   Sensation and strength grossly normal and no clonus, no rigidity, no tremor  Mental Status: Appearance/Cooperation: oriented times 3  Musculoskeletal: no obvious deformity  EKG, Pathology, and Other Studies: I have personally reviewed pertinent reports  Lab Results: I have personally reviewed pertinent reports  Labs:  Results from last 7 days   Lab Units 12/11/19  1223   WBC Thousand/uL 9 50   HEMOGLOBIN g/dL 16 1*   HEMATOCRIT % 46 7*   PLATELETS Thousands/uL 371   NEUTROS PCT % 62   LYMPHS PCT % 27   MONOS PCT % 10      Results from last 7 days   Lab Units 12/11/19  1222   SODIUM mmol/L 137   POTASSIUM mmol/L 4 2   CHLORIDE mmol/L 103   CO2 mmol/L 21*   BUN mg/dL 22   CREATININE mg/dL 0 76   CALCIUM mg/dL 8 9   ALK PHOS U/L 86   ALT U/L 132*   AST U/L 119*              0   Lab Value Date/Time    TROPONINI 0 02 05/07/2019 1803    TROPONINI 0 02 05/07/2019 1447    TROPONINI 0 02 05/07/2019 1205    TROPONINI 0 02 05/07/2019 0901    TROPONINI <0 02 05/07/2019 0540    TROPONINI <0 01 08/09/2018 1825    TROPONINI <0 02 05/16/2018 1258         Results from last 7 days   Lab Units 12/11/19  1222   ACETAMINOPHEN LVL ug/mL 15   ETHANOL LVL mg/dL 130*   SALICYLATE LVL mg/dL <2 5*     Invalid input(s): EXTPREGUR     Imaging Studies: I have personally reviewed pertinent reports  Counseling / Coordination of Care  Total floor / unit time spent today 35 minutes  Greater than 50% of total time was spent with the patient and / or family counseling and / or coordination of care

## 2019-12-12 ENCOUNTER — HOSPITAL ENCOUNTER (INPATIENT)
Facility: HOSPITAL | Age: 54
LOS: 7 days | Discharge: HOME | DRG: 885 | End: 2019-12-19
Attending: PSYCHIATRY & NEUROLOGY | Admitting: PSYCHIATRY & NEUROLOGY
Payer: MEDICARE

## 2019-12-12 VITALS
DIASTOLIC BLOOD PRESSURE: 78 MMHG | TEMPERATURE: 98.5 F | WEIGHT: 184.3 LBS | OXYGEN SATURATION: 98 % | HEIGHT: 63 IN | BODY MASS INDEX: 32.66 KG/M2 | HEART RATE: 62 BPM | RESPIRATION RATE: 14 BRPM | SYSTOLIC BLOOD PRESSURE: 131 MMHG

## 2019-12-12 DIAGNOSIS — T14.91XA SUICIDE ATTEMPT (HCC): ICD-10-CM

## 2019-12-12 DIAGNOSIS — F10.20 ALCOHOL USE DISORDER, SEVERE, DEPENDENCE (HCC): ICD-10-CM

## 2019-12-12 DIAGNOSIS — F31.4 BIPOLAR AFFECTIVE DISORDER, DEPRESSED, SEVERE (HCC): ICD-10-CM

## 2019-12-12 DIAGNOSIS — F31.4 BIPOLAR I DISORDER, MOST RECENT EPISODE DEPRESSED, SEVERE WITHOUT PSYCHOTIC FEATURES (HCC): Primary | Chronic | ICD-10-CM

## 2019-12-12 PROBLEM — R11.2 NAUSEA & VOMITING: Status: RESOLVED | Noted: 2018-05-17 | Resolved: 2019-12-12

## 2019-12-12 LAB
ALBUMIN SERPL BCP-MCNC: 3.2 G/DL (ref 3–5.2)
ALBUMIN SERPL BCP-MCNC: 3.4 G/DL (ref 3–5.2)
ALP SERPL-CCNC: 75 U/L (ref 43–122)
ALP SERPL-CCNC: 78 U/L (ref 43–122)
ALT SERPL W P-5'-P-CCNC: 130 U/L (ref 9–52)
ALT SERPL W P-5'-P-CCNC: 133 U/L (ref 9–52)
ANION GAP SERPL CALCULATED.3IONS-SCNC: 7 MMOL/L (ref 5–14)
APAP SERPL-MCNC: <10 UG/ML (ref 10–20)
AST SERPL W P-5'-P-CCNC: 124 U/L (ref 14–36)
AST SERPL W P-5'-P-CCNC: 130 U/L (ref 14–36)
ATRIAL RATE: 99 BPM
BILIRUB DIRECT SERPL-MCNC: 0.7 MG/DL
BILIRUB DIRECT SERPL-MCNC: 0.7 MG/DL
BILIRUB SERPL-MCNC: 4.3 MG/DL
BILIRUB SERPL-MCNC: 5.1 MG/DL
BUN SERPL-MCNC: 9 MG/DL (ref 5–25)
CALCIUM SERPL-MCNC: 7.2 MG/DL (ref 8.4–10.2)
CHLORIDE SERPL-SCNC: 105 MMOL/L (ref 97–108)
CO2 SERPL-SCNC: 21 MMOL/L (ref 22–30)
CREAT SERPL-MCNC: 0.51 MG/DL (ref 0.6–1.2)
ERYTHROCYTE [DISTWIDTH] IN BLOOD BY AUTOMATED COUNT: 13.9 %
GFR SERPL CREATININE-BSD FRML MDRD: 111 ML/MIN/1.73SQ M
GLUCOSE SERPL-MCNC: 131 MG/DL (ref 70–99)
HCT VFR BLD AUTO: 39 % (ref 36–46)
HGB BLD-MCNC: 13.5 G/DL (ref 12–16)
MAGNESIUM SERPL-MCNC: 1.6 MG/DL (ref 1.6–2.3)
MCH RBC QN AUTO: 33.4 PG (ref 26–34)
MCHC RBC AUTO-ENTMCNC: 34.7 G/DL (ref 31–36)
MCV RBC AUTO: 96 FL (ref 80–100)
P AXIS: 56 DEGREES
PHOSPHATE SERPL-MCNC: 2.4 MG/DL (ref 2.5–4.8)
PLATELET # BLD AUTO: 254 THOUSANDS/UL (ref 150–450)
PMV BLD AUTO: 7.6 FL (ref 8.9–12.7)
POTASSIUM SERPL-SCNC: 3.4 MMOL/L (ref 3.6–5)
PR INTERVAL: 178 MS
PROT SERPL-MCNC: 6.1 G/DL (ref 5.9–8.4)
PROT SERPL-MCNC: 6.3 G/DL (ref 5.9–8.4)
QRS AXIS: 13 DEGREES
QRSD INTERVAL: 78 MS
QT INTERVAL: 370 MS
QTC INTERVAL: 474 MS
RBC # BLD AUTO: 4.06 MILLION/UL (ref 4–5.2)
SODIUM SERPL-SCNC: 133 MMOL/L (ref 137–147)
T WAVE AXIS: 7 DEGREES
VENTRICULAR RATE: 99 BPM
WBC # BLD AUTO: 8.4 THOUSAND/UL (ref 4.5–11)

## 2019-12-12 PROCEDURE — 80329 ANALGESICS NON-OPIOID 1 OR 2: CPT | Performed by: FAMILY MEDICINE

## 2019-12-12 PROCEDURE — 83735 ASSAY OF MAGNESIUM: CPT | Performed by: FAMILY MEDICINE

## 2019-12-12 PROCEDURE — 80048 BASIC METABOLIC PNL TOTAL CA: CPT | Performed by: FAMILY MEDICINE

## 2019-12-12 PROCEDURE — 80076 HEPATIC FUNCTION PANEL: CPT | Performed by: FAMILY MEDICINE

## 2019-12-12 PROCEDURE — 93005 ELECTROCARDIOGRAM TRACING: CPT

## 2019-12-12 PROCEDURE — 99217 PR OBSERVATION CARE DISCHARGE MANAGEMENT: CPT | Performed by: FAMILY MEDICINE

## 2019-12-12 PROCEDURE — 93010 ELECTROCARDIOGRAM REPORT: CPT

## 2019-12-12 PROCEDURE — 84100 ASSAY OF PHOSPHORUS: CPT | Performed by: FAMILY MEDICINE

## 2019-12-12 PROCEDURE — 85027 COMPLETE CBC AUTOMATED: CPT | Performed by: FAMILY MEDICINE

## 2019-12-12 PROCEDURE — 99213 OFFICE O/P EST LOW 20 MIN: CPT | Performed by: PSYCHIATRY & NEUROLOGY

## 2019-12-12 RX ORDER — SUCRALFATE 1 G/1
1 TABLET ORAL
Status: CANCELLED | OUTPATIENT
Start: 2019-12-12

## 2019-12-12 RX ORDER — LANOLIN ALCOHOL/MO/W.PET/CERES
3 CREAM (GRAM) TOPICAL
Status: CANCELLED | OUTPATIENT
Start: 2019-12-12

## 2019-12-12 RX ORDER — HALOPERIDOL 5 MG
5 TABLET ORAL EVERY 8 HOURS PRN
Status: DISCONTINUED | OUTPATIENT
Start: 2019-12-12 | End: 2019-12-12

## 2019-12-12 RX ORDER — RISPERIDONE 1 MG/1
1 TABLET, ORALLY DISINTEGRATING ORAL EVERY 8 HOURS PRN
Status: DISCONTINUED | OUTPATIENT
Start: 2019-12-12 | End: 2019-12-19 | Stop reason: HOSPADM

## 2019-12-12 RX ORDER — THIAMINE MONONITRATE (VIT B1) 100 MG
100 TABLET ORAL DAILY
Status: DISCONTINUED | OUTPATIENT
Start: 2019-12-13 | End: 2019-12-19 | Stop reason: HOSPADM

## 2019-12-12 RX ORDER — OLANZAPINE 10 MG/1
10 INJECTION, POWDER, LYOPHILIZED, FOR SOLUTION INTRAMUSCULAR 2 TIMES DAILY PRN
Status: DISCONTINUED | OUTPATIENT
Start: 2019-12-12 | End: 2019-12-19 | Stop reason: HOSPADM

## 2019-12-12 RX ORDER — FOLIC ACID 1 MG/1
1 TABLET ORAL DAILY
Status: CANCELLED | OUTPATIENT
Start: 2019-12-12

## 2019-12-12 RX ORDER — TRAZODONE HYDROCHLORIDE 50 MG/1
25 TABLET ORAL
Status: CANCELLED | OUTPATIENT
Start: 2019-12-12

## 2019-12-12 RX ORDER — SUCRALFATE 1 G/1
1 TABLET ORAL
Status: DISCONTINUED | OUTPATIENT
Start: 2019-12-12 | End: 2019-12-19 | Stop reason: HOSPADM

## 2019-12-12 RX ORDER — NICOTINE 21 MG/24HR
1 PATCH, TRANSDERMAL 24 HOURS TRANSDERMAL DAILY
Status: CANCELLED | OUTPATIENT
Start: 2019-12-13

## 2019-12-12 RX ORDER — LANOLIN ALCOHOL/MO/W.PET/CERES
3 CREAM (GRAM) TOPICAL
Status: DISCONTINUED | OUTPATIENT
Start: 2019-12-12 | End: 2019-12-16

## 2019-12-12 RX ORDER — HYDROCHLOROTHIAZIDE 25 MG/1
25 TABLET ORAL DAILY
Status: DISCONTINUED | OUTPATIENT
Start: 2019-12-13 | End: 2019-12-18

## 2019-12-12 RX ORDER — FOLIC ACID 1 MG/1
1 TABLET ORAL DAILY
Status: DISCONTINUED | OUTPATIENT
Start: 2019-12-13 | End: 2019-12-19 | Stop reason: HOSPADM

## 2019-12-12 RX ORDER — AMLODIPINE BESYLATE 5 MG/1
5 TABLET ORAL DAILY
Status: CANCELLED | OUTPATIENT
Start: 2019-12-13

## 2019-12-12 RX ORDER — BENZTROPINE MESYLATE 1 MG/ML
1 INJECTION INTRAMUSCULAR; INTRAVENOUS EVERY 6 HOURS PRN
Status: CANCELLED | OUTPATIENT
Start: 2019-12-12

## 2019-12-12 RX ORDER — LAMOTRIGINE 25 MG/1
25 TABLET ORAL
Status: CANCELLED | OUTPATIENT
Start: 2019-12-12

## 2019-12-12 RX ORDER — METOPROLOL TARTRATE 50 MG/1
50 TABLET, FILM COATED ORAL EVERY 12 HOURS
Status: DISCONTINUED | OUTPATIENT
Start: 2019-12-13 | End: 2019-12-19 | Stop reason: HOSPADM

## 2019-12-12 RX ORDER — BENZTROPINE MESYLATE 1 MG/1
1 TABLET ORAL EVERY 6 HOURS PRN
Status: CANCELLED | OUTPATIENT
Start: 2019-12-12

## 2019-12-12 RX ORDER — ACETAMINOPHEN 325 MG/1
650 TABLET ORAL EVERY 4 HOURS PRN
Status: DISCONTINUED | OUTPATIENT
Start: 2019-12-12 | End: 2019-12-13

## 2019-12-12 RX ORDER — METOPROLOL TARTRATE 50 MG/1
50 TABLET, FILM COATED ORAL EVERY 12 HOURS
Status: CANCELLED | OUTPATIENT
Start: 2019-12-12

## 2019-12-12 RX ORDER — MAGNESIUM HYDROXIDE/ALUMINUM HYDROXICE/SIMETHICONE 120; 1200; 1200 MG/30ML; MG/30ML; MG/30ML
30 SUSPENSION ORAL EVERY 4 HOURS PRN
Status: CANCELLED | OUTPATIENT
Start: 2019-12-12

## 2019-12-12 RX ORDER — METOPROLOL TARTRATE 5 MG/5ML
5 INJECTION INTRAVENOUS EVERY 6 HOURS PRN
Status: DISCONTINUED | OUTPATIENT
Start: 2019-12-12 | End: 2019-12-12 | Stop reason: HOSPADM

## 2019-12-12 RX ORDER — LEVOTHYROXINE SODIUM 0.05 MG/1
50 TABLET ORAL DAILY
Status: DISCONTINUED | OUTPATIENT
Start: 2019-12-13 | End: 2019-12-19 | Stop reason: HOSPADM

## 2019-12-12 RX ORDER — HALOPERIDOL 5 MG
5 TABLET ORAL EVERY 8 HOURS PRN
Status: DISCONTINUED | OUTPATIENT
Start: 2019-12-12 | End: 2019-12-19 | Stop reason: HOSPADM

## 2019-12-12 RX ORDER — LEVOTHYROXINE SODIUM 0.05 MG/1
50 TABLET ORAL DAILY
Status: CANCELLED | OUTPATIENT
Start: 2019-12-13

## 2019-12-12 RX ORDER — OLANZAPINE 10 MG/1
10 INJECTION, POWDER, LYOPHILIZED, FOR SOLUTION INTRAMUSCULAR 2 TIMES DAILY PRN
Status: CANCELLED | OUTPATIENT
Start: 2019-12-12

## 2019-12-12 RX ORDER — GABAPENTIN 400 MG/1
400 CAPSULE ORAL 3 TIMES DAILY
Status: CANCELLED | OUTPATIENT
Start: 2019-12-12

## 2019-12-12 RX ORDER — LORAZEPAM 1 MG/1
1 TABLET ORAL EVERY 4 HOURS PRN
Status: CANCELLED | OUTPATIENT
Start: 2019-12-12

## 2019-12-12 RX ORDER — PANTOPRAZOLE SODIUM 40 MG/1
40 TABLET, DELAYED RELEASE ORAL
Status: CANCELLED | OUTPATIENT
Start: 2019-12-13

## 2019-12-12 RX ORDER — TRAZODONE HYDROCHLORIDE 50 MG/1
25 TABLET ORAL
Status: DISCONTINUED | OUTPATIENT
Start: 2019-12-12 | End: 2019-12-19 | Stop reason: HOSPADM

## 2019-12-12 RX ORDER — PANTOPRAZOLE SODIUM 40 MG/1
40 TABLET, DELAYED RELEASE ORAL
Status: DISCONTINUED | OUTPATIENT
Start: 2019-12-13 | End: 2019-12-19 | Stop reason: HOSPADM

## 2019-12-12 RX ORDER — LISINOPRIL 20 MG/1
20 TABLET ORAL DAILY
Status: CANCELLED | OUTPATIENT
Start: 2019-12-13

## 2019-12-12 RX ORDER — MAGNESIUM HYDROXIDE/ALUMINUM HYDROXICE/SIMETHICONE 120; 1200; 1200 MG/30ML; MG/30ML; MG/30ML
30 SUSPENSION ORAL EVERY 4 HOURS PRN
Status: DISCONTINUED | OUTPATIENT
Start: 2019-12-12 | End: 2019-12-19 | Stop reason: HOSPADM

## 2019-12-12 RX ORDER — ACETAMINOPHEN 325 MG/1
650 TABLET ORAL EVERY 4 HOURS PRN
Status: CANCELLED | OUTPATIENT
Start: 2019-12-12

## 2019-12-12 RX ORDER — HALOPERIDOL 5 MG
5 TABLET ORAL EVERY 8 HOURS PRN
Status: CANCELLED | OUTPATIENT
Start: 2019-12-12

## 2019-12-12 RX ORDER — LAMOTRIGINE 25 MG/1
25 TABLET ORAL
Status: DISCONTINUED | OUTPATIENT
Start: 2019-12-12 | End: 2019-12-13

## 2019-12-12 RX ORDER — DOCUSATE SODIUM 100 MG/1
100 CAPSULE, LIQUID FILLED ORAL 2 TIMES DAILY
Status: CANCELLED | OUTPATIENT
Start: 2019-12-12

## 2019-12-12 RX ORDER — BENZTROPINE MESYLATE 1 MG/1
1 TABLET ORAL EVERY 6 HOURS PRN
Status: DISCONTINUED | OUTPATIENT
Start: 2019-12-12 | End: 2019-12-19 | Stop reason: HOSPADM

## 2019-12-12 RX ORDER — NICOTINE 21 MG/24HR
1 PATCH, TRANSDERMAL 24 HOURS TRANSDERMAL DAILY
Status: DISCONTINUED | OUTPATIENT
Start: 2019-12-13 | End: 2019-12-19 | Stop reason: HOSPADM

## 2019-12-12 RX ORDER — DOCUSATE SODIUM 100 MG/1
100 CAPSULE, LIQUID FILLED ORAL 2 TIMES DAILY
Status: DISCONTINUED | OUTPATIENT
Start: 2019-12-13 | End: 2019-12-19 | Stop reason: HOSPADM

## 2019-12-12 RX ORDER — CLONIDINE HYDROCHLORIDE 0.1 MG/1
0.1 TABLET ORAL 2 TIMES DAILY
Status: DISCONTINUED | OUTPATIENT
Start: 2019-12-13 | End: 2019-12-19 | Stop reason: HOSPADM

## 2019-12-12 RX ORDER — LISINOPRIL 20 MG/1
20 TABLET ORAL DAILY
Status: DISCONTINUED | OUTPATIENT
Start: 2019-12-13 | End: 2019-12-19 | Stop reason: HOSPADM

## 2019-12-12 RX ORDER — CLONIDINE HYDROCHLORIDE 0.1 MG/1
0.1 TABLET ORAL 2 TIMES DAILY
Status: CANCELLED | OUTPATIENT
Start: 2019-12-12

## 2019-12-12 RX ORDER — VENLAFAXINE HYDROCHLORIDE 150 MG/1
150 CAPSULE, EXTENDED RELEASE ORAL DAILY
Status: DISCONTINUED | OUTPATIENT
Start: 2019-12-13 | End: 2019-12-13

## 2019-12-12 RX ORDER — RISPERIDONE 0.5 MG/1
1 TABLET, ORALLY DISINTEGRATING ORAL EVERY 8 HOURS PRN
Status: CANCELLED | OUTPATIENT
Start: 2019-12-12

## 2019-12-12 RX ORDER — HYDROCHLOROTHIAZIDE 25 MG/1
25 TABLET ORAL DAILY
Status: CANCELLED | OUTPATIENT
Start: 2019-12-13

## 2019-12-12 RX ORDER — VENLAFAXINE HYDROCHLORIDE 150 MG/1
150 CAPSULE, EXTENDED RELEASE ORAL DAILY
Status: CANCELLED | OUTPATIENT
Start: 2019-12-13

## 2019-12-12 RX ORDER — THIAMINE MONONITRATE (VIT B1) 100 MG
100 TABLET ORAL DAILY
Status: CANCELLED | OUTPATIENT
Start: 2019-12-12

## 2019-12-12 RX ORDER — AMLODIPINE BESYLATE 5 MG/1
5 TABLET ORAL DAILY
Status: DISCONTINUED | OUTPATIENT
Start: 2019-12-13 | End: 2019-12-13

## 2019-12-12 RX ORDER — LORAZEPAM 1 MG/1
1 TABLET ORAL EVERY 4 HOURS PRN
Status: DISCONTINUED | OUTPATIENT
Start: 2019-12-12 | End: 2019-12-19 | Stop reason: HOSPADM

## 2019-12-12 RX ORDER — BENZTROPINE MESYLATE 1 MG/ML
1 INJECTION INTRAMUSCULAR; INTRAVENOUS EVERY 6 HOURS PRN
Status: DISCONTINUED | OUTPATIENT
Start: 2019-12-12 | End: 2019-12-19 | Stop reason: HOSPADM

## 2019-12-12 RX ORDER — GABAPENTIN 400 MG/1
400 CAPSULE ORAL 3 TIMES DAILY
Status: DISCONTINUED | OUTPATIENT
Start: 2019-12-12 | End: 2019-12-13

## 2019-12-12 RX ADMIN — SODIUM CHLORIDE 125 ML/HR: 0.9 INJECTION, SOLUTION INTRAVENOUS at 09:08

## 2019-12-12 RX ADMIN — LORAZEPAM 1 MG: 2 INJECTION INTRAMUSCULAR; INTRAVENOUS at 02:16

## 2019-12-12 RX ADMIN — AMLODIPINE BESYLATE 5 MG: 5 TABLET ORAL at 08:15

## 2019-12-12 RX ADMIN — METOPROLOL TARTRATE 50 MG: 50 TABLET, FILM COATED ORAL at 06:03

## 2019-12-12 RX ADMIN — SUCRALFATE 1 G: 1 TABLET ORAL at 11:29

## 2019-12-12 RX ADMIN — SUCRALFATE 1 G: 1 TABLET ORAL at 06:03

## 2019-12-12 RX ADMIN — METOPROLOL TARTRATE 5 MG: 5 INJECTION INTRAVENOUS at 02:15

## 2019-12-12 RX ADMIN — DOCUSATE SODIUM 100 MG: 100 CAPSULE, LIQUID FILLED ORAL at 08:15

## 2019-12-12 RX ADMIN — SUCRALFATE 1 G: 1 TABLET ORAL at 15:34

## 2019-12-12 RX ADMIN — METOPROLOL TARTRATE 50 MG: 50 TABLET, FILM COATED ORAL at 17:10

## 2019-12-12 RX ADMIN — GABAPENTIN 400 MG: 400 CAPSULE ORAL at 15:34

## 2019-12-12 RX ADMIN — PANTOPRAZOLE SODIUM 40 MG: 40 TABLET, DELAYED RELEASE ORAL at 06:03

## 2019-12-12 RX ADMIN — ENOXAPARIN SODIUM 40 MG: 40 INJECTION SUBCUTANEOUS at 08:15

## 2019-12-12 RX ADMIN — FOLIC ACID: 5 INJECTION, SOLUTION INTRAMUSCULAR; INTRAVENOUS; SUBCUTANEOUS at 08:20

## 2019-12-12 RX ADMIN — LISINOPRIL 20 MG: 20 TABLET ORAL at 08:15

## 2019-12-12 RX ADMIN — NICOTINE 1 PATCH: 14 PATCH, EXTENDED RELEASE TRANSDERMAL at 08:17

## 2019-12-12 RX ADMIN — CLONIDINE HYDROCHLORIDE 0.1 MG: 0.1 TABLET ORAL at 17:10

## 2019-12-12 RX ADMIN — VENLAFAXINE HYDROCHLORIDE 150 MG: 150 CAPSULE, EXTENDED RELEASE ORAL at 08:15

## 2019-12-12 RX ADMIN — LORAZEPAM 1 MG: 2 INJECTION INTRAMUSCULAR; INTRAVENOUS at 15:34

## 2019-12-12 RX ADMIN — TRAZODONE HYDROCHLORIDE 150 MG: 100 TABLET ORAL at 21:33

## 2019-12-12 RX ADMIN — LAMOTRIGINE 25 MG: 25 TABLET ORAL at 21:32

## 2019-12-12 RX ADMIN — GABAPENTIN 400 MG: 400 CAPSULE ORAL at 21:34

## 2019-12-12 RX ADMIN — HYDROCHLOROTHIAZIDE 25 MG: 25 TABLET ORAL at 08:15

## 2019-12-12 RX ADMIN — GABAPENTIN 400 MG: 400 CAPSULE ORAL at 08:15

## 2019-12-12 RX ADMIN — MELATONIN TAB 3 MG 3 MG: 3 TAB at 21:33

## 2019-12-12 RX ADMIN — LURASIDONE HYDROCHLORIDE 80 MG: 80 TABLET, FILM COATED ORAL at 16:23

## 2019-12-12 RX ADMIN — SUCRALFATE 1 G: 1 TABLET ORAL at 21:34

## 2019-12-12 RX ADMIN — LEVOTHYROXINE SODIUM 50 MCG: 50 TABLET ORAL at 06:09

## 2019-12-12 RX ADMIN — DOCUSATE SODIUM 100 MG: 100 CAPSULE, LIQUID FILLED ORAL at 17:11

## 2019-12-12 RX ADMIN — CLONIDINE HYDROCHLORIDE 0.1 MG: 0.1 TABLET ORAL at 08:15

## 2019-12-12 NOTE — QUICK NOTE
AST and ALT stable and slightly trending downward  At this point, would recommend discontinuation of N-acetylcysteine  She has received a full 21 hour treatment course  Her LFTs at this point are not reflective of acetaminophen toxicity  Please have patient evaluated by Psychiatry

## 2019-12-12 NOTE — ASSESSMENT & PLAN NOTE
As per patient, she took handful of Tylenol p m  Tablets last night around 11:00 p m  She also took 4 beers  She denies taking any other medication or additional alcohol  This morning she woke up with abdominal pain and vomiting  She decided to come to ER  In the ER, acetaminophen level was 15  Elevated LFTs were noted  Ethanol level was elevated as well   was consulted from the ER who recommended to start  acetyl cystine and keep her under observation  EKG shows normal sinus rhythm  CXR negative for effusion   Patient was started on acetylcysteine and completed for 24 hours  Her LFT were trended, currently trending down  Discussed with Toxicology who does not think acetyl cystine needs to be continued  Psychiatry consultation appreciated    Patient is medically cleared to be transferred to inpatient [address] [place] [address]

## 2019-12-12 NOTE — NURSING NOTE
Patient with improved headache  CIWA score up to 5  Sleeps well and snores with sleep  Continues on acetadote drip  1:1 continues for suicide ideation

## 2019-12-12 NOTE — NURSING NOTE
NO CHANGE IN ASSESSMENT, 1:1 remains at bedside for suicidal precautions, pt ambulated to room 411 to use bathroom, gait steady, then back to bed, and falls asleep, snoring

## 2019-12-12 NOTE — NURSING NOTE
Pt awake, alert and orientd, c/o of headache, falls asleep when undisturbed, 1"1 remains at bedside for suicidal precautions, affect flat, refused to do oral care or wash up, "later", pt refusing to eat, will continue to monitor pt

## 2019-12-12 NOTE — PLAN OF CARE
Problem: Potential for Falls  Goal: Patient will remain free of falls  Description  INTERVENTIONS:  - Assess patient frequently for physical needs  -  Identify cognitive and physical deficits and behaviors that affect risk of falls    -  [place] fall precautions as indicated by assessment   - Educate patient/family on patient safety including physical limitations  - Instruct patient to call for assistance with activity based on assessment  - Modify environment to reduce risk of injury  - Consider OT/PT consult to assist with strengthening/mobility  Outcome: Progressing     Problem: PAIN - ADULT  Goal: Verbalizes/displays adequate comfort level or baseline comfort level  Description  Interventions:  - Encourage patient to monitor pain and request assistance  - Assess pain using appropriate pain scale  - Administer analgesics based on type and severity of pain and evaluate response  - Implement non-pharmacological measures as appropriate and evaluate response  - Consider cultural and social influences on pain and pain management  - Notify physician/advanced practitioner if interventions unsuccessful or patient reports new pain  Outcome: Progressing     Problem: SAFETY ADULT  Goal: Maintain or return to baseline ADL function  Description  INTERVENTIONS:  -  Assess patient's ability to carry out ADLs; assess patient's baseline for ADL function and identify physical deficits which impact ability to perform ADLs (bathing, care of mouth/teeth, toileting, grooming, dressing, etc )  - Assess/evaluate cause of self-care deficits   - Assess range of motion  - Assess patient's mobility; develop plan if impaired  - Assess patient's need for assistive devices and provide as appropriate  - Encourage maximum independence but intervene and supervise when necessary  - Involve family in performance of ADLs  - Assess for home care needs following discharge   - Consider OT consult to assist with ADL evaluation and planning for discharge  - Provide patient education as appropriate  Outcome: Progressing  Goal: Maintain or return mobility status to optimal level  Description  INTERVENTIONS:  - Assess patient's baseline mobility status (ambulation, transfers, stairs, etc )    - Identify cognitive and physical deficits and behaviors that affect mobility  - Identify mobility aids required to assist with transfers and/or ambulation (gait belt, sit-to-stand, lift, walker, cane, etc )  - [place] fall precautions as indicated by assessment  - Record patient progress and toleration of activity level on Mobility SBAR; progress patient to next Phase/Stage  - Instruct patient to call for assistance with activity based on assessment  - Consider rehabilitation consult to assist with strengthening/weightbearing, etc   Outcome: Progressing     Problem: DISCHARGE PLANNING  Goal: Discharge to home or other facility with appropriate resources  Description  INTERVENTIONS:  - Identify barriers to discharge w/patient and caregiver  - Arrange for needed discharge resources and transportation as appropriate  - Identify discharge learning needs (meds, wound care, etc )  - Arrange for interpretive services to assist at discharge as needed  - Refer to Case Management Department for coordinating discharge planning if the patient needs post-hospital services based on physician/advanced practitioner order or complex needs related to functional status, cognitive ability, or social support system  Outcome: Progressing     Problem: Knowledge Deficit  Goal: Patient/family/caregiver demonstrates understanding of disease process, treatment plan, medications, and discharge instructions  Description  Complete learning assessment and assess knowledge base    Interventions:  - Provide teaching at level of understanding  - Provide teaching via preferred learning methods  Outcome: Progressing     Problem: Prexisting or High Potential for Compromised Skin Integrity  Goal: Skin integrity is maintained or improved  Description  INTERVENTIONS:  - Identify patients at risk for skin breakdown  - Assess and monitor skin integrity  - Assess and monitor nutrition and hydration status  - Monitor labs   - Assess for incontinence   - Turn and reposition patient  - Assist with mobility/ambulation  - Relieve pressure over bony prominences  - Avoid friction and shearing  - Provide appropriate hygiene as needed including keeping skin clean and dry  - Evaluate need for skin moisturizer/barrier cream  - Collaborate with interdisciplinary team   - Patient/family teaching  - Consider wound care consult   Outcome: Progressing

## 2019-12-12 NOTE — QUICK NOTE
Patient's liver function test is trending down  Patient completed N acetyl cystine for 24 hours  She denies nausea vomiting or abdominal pain at this moment  Discussed with Toxicology who agrees with discontinuing N acetyl cystine as well  Patient is medically clear to be discharged to inpatient  Behavioral Unit

## 2019-12-12 NOTE — QUICK NOTE
Reviewed morning labs  Please recheck CMP at 12pm today  If AST and ALT decreasing, may DC NAC and clear from APAP toxicity  If AST and ALT rising, please reorder acetylcysteine at 6 25 mg/kg/hr (third bag) and continue to monitor CMP Q6-8 hours until AST and ALT peak and then downtrend  Please reach out for any concerns or questions

## 2019-12-12 NOTE — UTILIZATION REVIEW
Initial Clinical Review    Admission: Date/Time/Statement:   Orders Placed This Encounter   Procedures    Place in Observation (expected length of stay for this patient is less than two midnights)     Standing Status:   Standing     Number of Occurrences:   1     Order Specific Question:   Admitting Physician     Answer:   Liliana Rodríguez     Order Specific Question:   Level of Care     Answer:   Med Surg [16]     ED Arrival Information     Expected Arrival Acuity Means of Arrival Escorted By Service Admission Type    - 12/11/2019 11:47 [facility] [address]         Chief Complaint   Patient presents with    Suicide Attempt     took approximetly 10 tylenol PM with alcohol last night in an attempt to harm herself "i didnt want to wake up" pt has nausea and abdominal discomfort today     Assessment/Plan:   * Intentional acetaminophen overdose ([facility] [address] )  Assessment & Plan  As per patient, she took handful of Tylenol p m  Tablets last night around 11:00 p m  She also took 4 beers  She denies taking any other medication or additional alcohol  This morning she woke up with abdominal pain and vomiting  She decided to come to ER  In the ER, acetaminophen level was 15  Elevated LFTs were noted  Ethanol level was elevated as well   was consulted from the ER who recommended to start  acetyl cystine and keep her under observation  EKG shows normal sinus rhythm  CXR negative for effusion   Will start telemetry monitoring  Patient was started on acetylcysteine in the ER    Will continue as per protocol  Monitor LFT, BMP on acetaminophen level every 12 hours  Will consult Psychiatry  One to one monitoring  Will keep patient NPO due to ongoing nausea  Zofran as needed        Alcohol abuse  Assessment & Plan  Elevated ETOH level on admission  CIWA protocol  Will monitor for withdrawal symptoms  Thiamin and folic acid supplement  Ativan as needed for agitation     Transaminitis  Assessment & Plan  Secondary to acute acetaminophen overdose vs  Alcohol abuse  Will continue to trend     Tobacco abuse  Assessment & Plan  Smoking cessation counseling provided to patient, initiate nicotine patch     Hypercholesteremia  Assessment & Plan  Will hold atorvastatin due to elevated LFTs     Bipolar I disorder, most recent episode depressed, severe without psychotic features ([facility] [address] )  Assessment & Plan  Continue home medication Latuda, Effexor and trazodone  Will consult Psychiatry  Will need inpatient psychiatry after discharge     h/o Seizure [facility])  Assessment & Plan  Continue home medication lamotrigine  Will monitor for alcohol withdrawal  Ativan as needed     Acquired hypothyroidism  Assessment & Plan  Normal TSH noted on admission  Continue home medication levothyroxine 50 mcg     Nausea & vomiting  Assessment & Plan  Secondary to acetaminophen overdose   Continue IV fluid  NPO  Zofran as needed  Trend LFTs     Essential hypertension  Assessment & Plan  I reviewed last hospital course  Patient needed multiple antihypertensive medication to achieve blood pressure control  Continue amlodipine 5 mg, hydrochlorothiazide 25 mg, lisinopril 20 mg, Lopressor 50 mg and clonidine 0 1 mg twice a day  Hold medication if systolic blood pressures less than 110                       VTE Prophylaxis: Enoxaparin (Lovenox)  / sequential compression device   Code Status: full     Anticipated Length of Stay:  Patient will be admitted on an Observation basis with an anticipated length of stay of  > 2 midnights  Justification for Hospital Stay: intentional overdose     Total Time for Visit, including Counseling / Coordination of Care: 1 hour    Greater than 50% of this total time spent on direct patient counseling and coordination of care      Chief Complaint:        History of Present Illness:   Tristen Moses is a 48 y o  female with history of bipolar disorder , depression , previous history of suicidal attempt and overdose , alcoholism presents today via EMS for intentional acetaminophen overdose  Patient states she took handful of Tylenol p m  Last night around 11:00 p m  In order to commit suicide  She also drank alcohol, admits to 4 beers last night only  However this morning she woke up feeling terrible, abdominal pain, nausea and vomiting  Therefore she decided to call 911 to seek care  In ER, vitals were stable  EKG shows normal sinus rhythm  Urine drug screen was negative  Acetaminophen level was 15 however ethanol level was 169  Her liver function test was abnormal in the ER as well  Toxicology was consulted who recommended patient to start on N acetylcysteine and admit her for monitoring  Patient had multiple episodes of vomiting at home however no further episode in the ER  Patient continues to be nauseous in the ER  Denies headaches are chest pain or short of breath      Patient was admitted in [address] [place] [address] unit from 11/14/2012-11/20/2019 for depression and suicidal ideation as well  Her medications were adjusted on discharge  Patient states she is compliant with her medications however her depression continue to worsen        12/12  @  0630:  CIWA score up to 5  Sleeps well and snores with sleep  Continues on acetadote drip  1:1 continues for suicide ideation         12/11  Toxicology:    Given patient's unclear timeline and elevated LFTs in the setting of detected acetaminophen, it is prudent to admit the patient and start her on NAC         1  Serial BMP, LFTs, and APAP q12     2  Start NAC  - oral preferred due to its first pass metabolism, lower expense, and decreased risk of allergic/anaphylactic reactions     3  NAC can be stopped when APAP is undetectable and LFTs are downtrending       4    Monitor for ETOH withdrawal     5  As with all overdoses, please involve psychiatry/social work          ED Triage Vitals [12/11/19 1152]   Temperature Pulse Respirations Blood Pressure SpO2   97 7 °F (36 5 °C) (!) 123 20 153/90 96 %      Temp Source Heart Rate Source Patient Position - Orthostatic VS BP Location FiO2 (%)   Tympanic Monitor Sitting Left arm --      Pain Score       Worst Possible Pain        Wt Readings from Last 1 Encounters:   12/11/19 83 6 kg (184 lb 4 9 oz)     Additional Vital Signs:   12/12/19 0400  99 4 °F (37 4 °C)  87  17 162/95   12/12/19 0200    84  15 181/96Abnormal    12/12/19 0100    91  13    12/12/19 0000  99 °F (37 2 °C)  85  15 169/95   12/11/19 2100    87  19    12/11/19 2000  99 °F (37 2 °C)  86  22 140/89   12/11/19 1800    106Abnormal   22 161/105Abnormal    12/11/19 1758       161/105Abnormal    12/11/19 1700    104  18 184/92Abnormal    12/11/19 1600    102  24 182/101Abnormal    12/11/19 1518  99 5 °F (37 5 °C)  105  32 167/107       Pertinent Labs/Diagnostic Test Results:   Results from last 7 days   Lab Units 12/11/19 1957 12/11/19  1223   WBC Thousand/uL 8 40 9 50   HEMOGLOBIN g/dL 13 6 16 1*   HEMATOCRIT % 38 9 46 7*   PLATELETS Thousands/uL 272 371   NEUTROS ABS Thousands/µL  --  5 90         Results from last 7 days   Lab Units 12/12/19  0532 12/11/19 1957 12/11/19  1222   SODIUM mmol/L  --  134* 137   POTASSIUM mmol/L  --  3 2* 4 2   CHLORIDE mmol/L  --  103 103   CO2 mmol/L  --  22 21*   ANION GAP mmol/L  --  9 13   BUN mg/dL  --  14 22   CREATININE mg/dL  --  0 49* 0 76   EGFR ml/min/1 73sq m  --  112 90   CALCIUM mg/dL  --  7 1* 8 9   MAGNESIUM mg/dL 1 6  --   --    PHOSPHORUS mg/dL 2 4*  --   --      Results from last 7 days   Lab Units 12/11/19 1957 12/11/19  1222   AST U/L 102* 119*   ALT U/L 119* 132*   ALK PHOS U/L 22* 86   TOTAL PROTEIN g/dL 6 0 7 6   ALBUMIN g/dL 3 2 4 3 TOTAL BILIRUBIN mg/dL 1 00 0 90   BILIRUBIN DIRECT mg/dL 0 20  --          Results from last 7 days   Lab Units 12/11/19 1957 12/11/19  1222   GLUCOSE RANDOM mg/dL 124* 173*     Results from last 7 days   Lab Units 12/11/19  1222   TSH 3RD GENERATON uIU/mL 1 050         Results from last 7 days   Lab Units 12/11/19  1222   LIPASE u/L 150     Results from last 7 days   Lab Units 12/11/19  1222   CLARITY UA  Clear   COLOR UA  Kasandra*   SPEC GRAV UA  1 030   PH UA  5 0   GLUCOSE UA mg/dl Negative   KETONES UA mg/dl Negative   BLOOD UA  Negative   PROTEIN UA mg/dl 15 (Trace)*   NITRITE UA  Negative   BILIRUBIN UA  Negative   UROBILINOGEN UA mg/dL Negative   LEUKOCYTES UA  Negative   WBC UA /hpf 1-2*   RBC UA /hpf None Seen   BACTERIA UA /hpf Occasional   EPITHELIAL CELLS WET PREP /hpf Moderate*   MUCUS THREADS  Occasional*         Results from last 7 days   Lab Units 12/11/19  1223   AMPH/METH  Negative   BARBITURATE UR  Negative   BENZODIAZEPINE UR  Negative   COCAINE UR  Negative   METHADONE URINE  Negative   OPIATE UR  Negative   PCP UR  Negative   THC UR  Negative     Results from last 7 days   Lab Units 12/11/19 1957 12/11/19  1222   ETHANOL LVL mg/dL  --  169*   ACETAMINOPHEN LVL ug/mL <43* 15   SALICYLATE LVL mg/dL  --  <1 0*     ED Treatment:   Medication Administration from 12/11/2019 1147 to 12/11/2019 1517       Date/Time Order Dose Route     12/11/2019 1228 sodium chloride 0 9 % bolus 1,000 mL 1,000 mL Intravenous     12/11/2019 1228 ondansetron (ZOFRAN) injection 4 mg 4 mg Intravenous     12/11/2019 1414 acetylcysteine (ACETADOTE) 12,460 mg in dextrose 5 % 200 mL IVPB 12,460 mg Intravenous     12/11/2019 1354 LORazepam (ATIVAN) 2 mg/mL injection 1 mg 1 mg Intravenous        Past Medical History:   Diagnosis Date    Alcoholism ([facility] [address] )     Anxiety     Bipolar disorder ([facility] [address] )     [facility]     Gastritis     Head injury     Heart palpitations     Hyperlipidemia     Hypertension     Hypothyroidism     PTSD (post-traumatic stress disorder)     Seizure ([place] [address] )     Suicide attempt ([place] [address] )      Present on Admission:   Transaminitis   Acquired hypothyroidism   Bipolar I disorder, most recent episode depressed, severe without psychotic features ([place] [address] )   Essential hypertension   Tobacco abuse   Hypercholesteremia   h/o Seizure ([place] [address] )      Admitting Diagnosis: Suicide attempt ([place] [address] ) Leticia Mass  Bipolar I disorder, most recent episode depressed, severe without psychotic features ([place] [address] ) [V38 7]  Uncomplicated alcohol dependence ([place] [address] ) [F10 20]  Intentional acetaminophen overdose, initial encounter ([place] [address] ) Tito Navarro  Age/Sex: 48 y o  female  Admission Orders:  SCD;  Consult psych and toxicoloty;  Continuous 1:1 observation;  Continuous pulse oximetry;  CIWA protocol ;  IVF NS @  125;  CONTINUOUS Acetylcysteine gtt     Scheduled Medications:    Medications:  amLODIPine 5 mg Oral Daily   cloNIDine 0 1 mg Oral BID   docusate sodium 100 mg Oral BID   enoxaparin 40 mg Subcutaneous Daily   folic acid 1 mg, thiamine 100 mg in 0 9% sodium chloride 100 mL IVPB  Intravenous Daily   gabapentin 400 mg Oral TID   hydrochlorothiazide 25 mg Oral Daily   lamoTRIgine 25 mg Oral HS   levothyroxine 50 mcg Oral Daily   lisinopril 20 mg Oral Daily   lurasidone 80 mg Oral Daily With Dinner   melatonin 3 mg Oral HS   metoprolol tartrate 50 mg Oral Q12H   nicotine 1 patch Transdermal Daily   pantoprazole 40 mg Oral Early Morning   sucralfate 1 g Oral 4x Daily (AC & HS)   traZODone 150 mg Oral HS   venlafaxine 150 mg Oral Daily     Continuous IV Infusions:    sodium chloride 125 mL/hr Intravenous Continuous     PRN Meds:    LORazepam 1 mg Intravenous Q6H PRN   metoprolol 5 mg Intravenous Q6H PRN   ondansetron 4 mg Intravenous Q6H PRN       Network Utilization Review Department  [email or web address]  ATTENTION: Please call with any questions or concerns to [phone] and carefully listen to the prompts so that you are directed to the right person  All voicemails are confidential   Curt Hickey all requests for admission clinical reviews, approved or denied determinations and any other requests to dedicated fax number below belonging to the campus where the patient is receiving treatment   List of dedicated fax numbers for the Facilities:  [address] DENIALS (Administrative/Medical Necessity) [phone]   [address] (Maternity/NICU/Pediatrics) [phone]   Tg Teague [phone]   Caitlyn Chandler [phone]   Serafin Bustamante [phone]   Jose M Night [place] [address] [facility] [phone]   [facility]  [phone]   Nelly Hernandez [address] 1000 [facility] [phone]

## 2019-12-12 NOTE — NURSING NOTE
The dorsal part of patient's right hand noted to be swollen, darker and slightly firm, was not present on initial assessment  Patient denies any pain there, positive radial pulse with movement and sensation to fingers and hand  NP made aware and shown and recommends Ice be applied and close monitoring

## 2019-12-12 NOTE — NURSING NOTE
Awake and alert, taking ice chips and denies any nausea,OOB to bedside commode to void without any difficulty  Denies wanting to hurt herself anymore, 1:1 at bedside for suicide ideation

## 2019-12-12 NOTE — ASSESSMENT & PLAN NOTE
Elevated ETOH level on admission  CIWA protocol  Patient did not have any signs or symptoms of withdrawal since admission  Thiamin and folic acid supplement

## 2019-12-12 NOTE — NURSING NOTE
Awaiting transfer to [facility], requesting something for anxiety, ativan 1 mg iv given for same, 1:1 remains at bedside, pt walking in hallway to br and in room, no change in assessment

## 2019-12-12 NOTE — CONSULTS
Psychiatric Evaluation - Behavioral Health     Identification Data:Bhavana Smith 48 y o  female MRN: [id number]  Unit/Bed#: ICU 56 Encounter: [id number]    Chief Complaint: " I want help"    History of present illness:    Patient is a 49 yo female with a long h/o Bipolar D/O, anxiety and alcohol use disorder, drank 4 beers and took a handful of acetaminophen, 500 mg # 10, ended up in ICU  Now is medically stable  Reports feeling depressed, worse in the past few weeks, hopeless, thoughts of wishing she were dead, took overdose to go to sleep permanently  Reports poor sleep, poor appetite, low energy and interest  Has a h/o manic mood swings, none recently  Also has a lot of anxiety, feeling tense, edgy restless, panicky, poor sleep  Alcohol use is less than in the past, still drinking 4 beers per day on couple of days per week  Denies psychotic symptoms  Present meds: WEffexor  mg daily, Melatonin 3 mg hs, trazodone 150 mg hs, Latuda 80 mg q 6 pm, Gabapentin 400 mg tid, Clonidine 0 1 mg bid  Psychiatric Review Of Systems:  Change in sleep: yes  Appetite changes: yes  Weight changes: no  Change in energy/anergy: yes  Change in interest/pleasure/anhedonia: yes  Somatic symptoms: no  Anxiety/panic: yes  Manic symptoms: no  Guilt feelings:yes  Hopeless: yes  Self injurious behavior/risky behavior: yes    Historical Information     Past Psychiatric History:   Hospitalized around 15 times for depression and anxiety, since mid 29's  Substance Abuse History:  [facility], alcohol use disorder, for many years, see above for current use      Family Psychiatric History:   None reported    Social History:  Developmental: born and rasied in [place]  Education: no high school  Marital history:   Living arrangement, social support: son  Occupational History: on permanent disability, 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  Access to firearms: none reported    Traumatic History:   Abuse:none is reported  Other Traumatic Events: none reported    Past Medical History:   Diagnosis Date    [facility]ism ([facility] [address] )     Anxiety     Bipolar disorder (HCC)     Bowel obstruction (HCC)     Gastritis     Head injury     Heart palpitations     Hyperlipidemia     Hypertension     Hypothyroidism     PTSD (post-traumatic stress disorder)     Seizure ([facility] [address] )     Suicide attempt [facility])        Medical Review Of Systems:  Pertinent items are noted in HPI  Meds/Allergies   all current active meds have been reviewed  Allergies   Allergen Reactions    Latex Rash     Objective      Mental Status Evaluation:  Appearance:  {Adequate hygiene and grooming   Behavior:  calm, cooperative and friendly   Speech:   Language Increased latency of response, Normal rate and Normal volume  Able to name objects and Able to repeat phrases   Mood:  depressed   Affect:    Thought process blunted  Goal directed and coherent   Associations: Tightly connected   Thought Content:  Does not verbalize delusional material   Perceptual Disturbances: Denies hallucinations and does not appear to be responding to internal stimuli   Risk Potential: Suicidal Ideations without plan, made recent attempt   Orientation  Oriented x 3   Memory Short term impaired and Long term intact   Attention/Concentration attention span appeared shorter than expected for age   Cynthia Sami of knowledge aware of current events and Aware of past history   Insight:  limited   Judgment: Limited   Gait/Station: normal gait/station   Motor Activity: No abnormal movement noted         Lab Results:   CBC:   Lab Results   Component Value Date    WBC 8 40 12/12/2019    HGB 13 5 12/12/2019    HCT 39 0 12/12/2019    MCV 96 12/12/2019     12/12/2019    MCH 33 4 12/12/2019    MCHC 34 7 12/12/2019    RDW 13 9 12/12/2019    MPV 7 6 (L) 12/12/2019   , BMP/CMP:   Lab Results   Component Value Date    K 3 4 (L) 12/12/2019     12/12/2019    CO2 21 (L) 12/12/2019    BUN 9 12/12/2019    CREATININE 0 51 (L) 12/12/2019    CALCIUM 7 2 (L) 12/12/2019     (H) 12/12/2019     (H) 12/12/2019    ALKPHOS 75 12/12/2019    EGFR 111 12/12/2019       Imaging Studies: CXR WNL  EKG, Pathology, and Other Studies: WNL      Assessment/Plan     Principal Problem:    Intentional acetaminophen overdose ([facility] [address] )  Active Problems:    Essential hypertension    Nausea & vomiting    Acquired hypothyroidism    h/o Seizure ([facility] [address] )    Bipolar I disorder, most recent episode depressed, severe without psychotic features ([facility] [address] )    Hypercholesteremia    Tobacco abuse    Transaminitis    Alcohol abuse    Suicidal ideation    Plan:      Transfer to psych unit for treatment of Bipolar D/O most recent episode depression, Generalized Anxiety D/O, Alcohol Use D/O, mild  201 signed

## 2019-12-12 NOTE — NURSING NOTE
Diet order received, fed patient toast, no complaints of nausea  K 3 2, replacement order received and same given  Patient's son, Bright Mendez, called, says he thinks his mom drinks "too much" and she probably called the ambulance so she could be in the hospital to get a break from drinking

## 2019-12-12 NOTE — SOCIAL WORK
SW notified by Attending that pt is medically cleared  Psychiatry evaluated pt and completed 201  Referral sent to intake  Beds only available at [facility] or 6 Floor  SW discussed with pt who prefers to stay at [facility]  Intake to notify of SW of accepting unit

## 2019-12-12 NOTE — DISCHARGE SUMMARY
Discharge- Yumi Canales 1965, 48 y o  female MRN: [id number]    Unit/Bed#:  Encounter: [id number]    Primary Care Provider: YONG Avila   Date and time admitted to hospital: 12/11/2019 11:47 AM        * Intentional acetaminophen overdose ([facility] [address] )  Assessment & Plan  As per patient, she took handful of Tylenol p m  Tablets last night around 11:00 p m  She also took 4 beers  She denies taking any other medication or additional alcohol  This morning she woke up with abdominal pain and vomiting  She decided to come to ER  In the ER, acetaminophen level was 15  Elevated LFTs were noted  Ethanol level was elevated as well   was consulted from the ER who recommended to start  acetyl cystine and keep her under observation  EKG shows normal sinus rhythm  CXR negative for effusion   Patient was started on acetylcysteine and completed for 24 hours  Her LFT were trended, currently trending down  Discussed with Toxicology who does not think acetyl cystine needs to be continued  Psychiatry consultation appreciated    Patient is medically cleared to be transferred to inpatient [address] [place] [address]      Alcohol abuse  Assessment & Plan  Elevated ETOH level on admission  CIWA protocol  Patient did not have any signs or symptoms of withdrawal since admission  Thiamin and folic acid supplement      Suicidal ideation  Assessment & Plan  Patient will be transferred to inpatient Behavioral Unit    Transaminitis  Assessment & Plan  Secondary to acute acetaminophen overdose vs  Alcohol abuse      Tobacco abuse  Assessment & Plan  Smoking cessation counseling provided to patient, initiate nicotine patch    Hypercholesteremia  Assessment & Plan  Will hold atorvastatin due to elevated LFTs    Bipolar I disorder, most recent episode depressed, severe without psychotic features [facility])  Assessment & Plan  Continue home medication Latuda, Effexor and trazodone  Will consult Psychiatry  Will need inpatient psychiatry after discharge    h/o Seizure [facility])  Assessment & Plan  Continue home medication lamotrigine  Will monitor for alcohol withdrawal  Ativan as needed    Acquired hypothyroidism  Assessment & Plan  Normal TSH noted on admission  Continue home medication levothyroxine 50 mcg    Essential hypertension  Assessment & Plan  I reviewed last hospital course  Patient needed multiple antihypertensive medication to achieve blood pressure control  Continue amlodipine 5 mg, hydrochlorothiazide 25 mg, lisinopril 20 mg, Lopressor 50 mg and clonidine 0 1 mg twice a day  Hold medication if systolic blood pressures less than 110             Discharging Physician / Practitioner: Tami Ewing MD  PCP: Mignon Soares [address] [place] [address]  Admission Date:   Admission Orders (From admission, onward)     Ordered        12/11/19 1330  Place in Observation (expected length of stay for this patient is less than two midnights)  Once                   Discharge Date: 12/12/19    Resolved Problems  Date Reviewed: 12/12/2019          Resolved    Nausea & vomiting 12/12/2019     Resolved by  Tami Ewing MD          Consultations During Hospital Stay:  · Toxicology  · psychiatry    Procedures Performed:   · none    Significant Findings / Test Results:   · Elevated LFTs  · elevated acetaminophen  · Elevated ETOH    Incidental Findings:   · none     Test Results Pending at Discharge (will require follow up):   · none          Reason for Admission:  Intentional overdose    Hospital Course:     Pily Benoit is a 48 y o  female patient who originally presented to the hospital on 12/11/2019 due to intentional acetaminophen overdose  Patient has history of suicidal attempt and drug overdose in the past as well  Patient was recently discharged from psych unit 1 month ago  Patient called EMS today after overdose and came to ER  In the ER, acetaminophen level was 15, elevated liver function tests were noted an elevated ethanol level was noted as well  Toxicology was consulted who recommended to start acetyl cystine and keep her under conservation  Patient completed acetyl cystine for 24 hours  Her LFTs started to trend down  However she does have elevated LFTs at baseline due to chronic ethanol abuse  The patient does not need any further acetylcysteine treatment  Patient is currently medically cleared to be discharged and patient Behavioral Health  Please see above list of diagnoses and related plan for additional information  Condition at Discharge: stable     Discharge Day Visit / Exam:     Subjective:  Patient denies any nausea or vomiting this morning  However she has poor appetite  She does not want to eat anything  She is however sipping on to water and ice chips  She denies any abdominal pain  She does complain of depression and sadness  Denies any chest pain or short of breath  Vitals: Blood Pressure: 143/69 (12/12/19 1200)  Pulse: 59 (12/12/19 1200)  Temperature: 98 2 °F (36 8 °C) (12/12/19 1134)  Temp Source: Temporal (12/12/19 1134)  Respirations: 14 (12/12/19 1200)  Height: 5' 3" (160 cm) (12/11/19 1518)  Weight - Scale: 83 6 kg (184 lb 4 9 oz) (12/11/19 1518)  SpO2: 99 % (12/12/19 1152)  Exam:   Physical Exam   Constitutional: She appears well-developed and well-nourished  HENT:   Head: Normocephalic and atraumatic  Right Ear: External ear normal    Left Ear: External ear normal    Nose: Nose normal    Mouth/Throat: Oropharynx is clear and moist    Eyes: Conjunctivae and EOM are normal    Neck: Normal range of motion  Neck supple  Cardiovascular: Normal rate, regular rhythm and normal heart sounds  Pulmonary/Chest: Effort normal and breath sounds normal    Genitourinary:   Genitourinary Comments: deferred   Neurological: She is alert  Cranial nerve 2 -12 are normal    Skin: Skin is warm and dry  Psychiatric: Her affect is blunt  She is slowed and withdrawn  She is inattentive            Discharge instructions/Information to patient and family:   See after visit summary for information provided to patient and family  Provisions for Follow-Up Care:  See after visit summary for information related to follow-up care and any pertinent home health orders  Disposition:     Inpatient Psychiatry at [facility]    ·      Planned Readmission: no     Discharge Statement:  I spent 45 minutes discharging the patient  This time was spent on the day of discharge  I had direct contact with the patient on the day of discharge  Greater than 50% of the total time was spent examining patient, answering all patient questions, arranging and discussing plan of care with patient as well as directly providing post-discharge instructions  Additional time then spent on discharge activities  Discharge Medications:  See after visit summary for reconciled discharge medications provided to patient and family        ** Please Note: This note has been constructed using a voice recognition system **

## 2019-12-13 LAB
ATRIAL RATE: 80 BPM
P AXIS: 60 DEGREES
PR INTERVAL: 208 MS
QRS AXIS: 17 DEGREES
QRSD INTERVAL: 70 MS
QT INTERVAL: 418 MS
QTC INTERVAL: 482 MS
T WAVE AXIS: 11 DEGREES
VENTRICULAR RATE: 80 BPM

## 2019-12-13 PROCEDURE — 97166 OT EVAL MOD COMPLEX 45 MIN: CPT

## 2019-12-13 PROCEDURE — 99222 1ST HOSP IP/OBS MODERATE 55: CPT | Performed by: PSYCHIATRY & NEUROLOGY

## 2019-12-13 PROCEDURE — 93010 ELECTROCARDIOGRAM REPORT: CPT | Performed by: INTERNAL MEDICINE

## 2019-12-13 PROCEDURE — 99222 1ST HOSP IP/OBS MODERATE 55: CPT | Performed by: FAMILY MEDICINE

## 2019-12-13 RX ORDER — LAMOTRIGINE 25 MG/1
50 TABLET ORAL DAILY
Status: DISCONTINUED | OUTPATIENT
Start: 2019-12-14 | End: 2019-12-19 | Stop reason: HOSPADM

## 2019-12-13 RX ORDER — TRAZODONE HYDROCHLORIDE 100 MG/1
200 TABLET ORAL
Status: DISCONTINUED | OUTPATIENT
Start: 2019-12-13 | End: 2019-12-19 | Stop reason: HOSPADM

## 2019-12-13 RX ORDER — DOXEPIN HYDROCHLORIDE 25 MG/1
50 CAPSULE ORAL
Status: DISCONTINUED | OUTPATIENT
Start: 2019-12-13 | End: 2019-12-19 | Stop reason: HOSPADM

## 2019-12-13 RX ORDER — GABAPENTIN 400 MG/1
400 CAPSULE ORAL 2 TIMES DAILY
Status: DISCONTINUED | OUTPATIENT
Start: 2019-12-13 | End: 2019-12-19 | Stop reason: HOSPADM

## 2019-12-13 RX ORDER — GABAPENTIN 300 MG/1
600 CAPSULE ORAL
Status: DISCONTINUED | OUTPATIENT
Start: 2019-12-13 | End: 2019-12-19 | Stop reason: HOSPADM

## 2019-12-13 RX ADMIN — THIAMINE HCL TAB 100 MG 100 MG: 100 TAB at 08:27

## 2019-12-13 RX ADMIN — METOPROLOL TARTRATE 50 MG: 50 TABLET, FILM COATED ORAL at 21:27

## 2019-12-13 RX ADMIN — FOLIC ACID 1 MG: 1 TABLET ORAL at 08:27

## 2019-12-13 RX ADMIN — PANTOPRAZOLE SODIUM 40 MG: 40 TABLET, DELAYED RELEASE ORAL at 05:36

## 2019-12-13 RX ADMIN — LEVOTHYROXINE SODIUM 50 MCG: 50 TABLET ORAL at 05:36

## 2019-12-13 RX ADMIN — NICOTINE 1 PATCH: 14 PATCH, EXTENDED RELEASE TRANSDERMAL at 08:26

## 2019-12-13 RX ADMIN — GABAPENTIN 600 MG: 300 CAPSULE ORAL at 21:27

## 2019-12-13 RX ADMIN — DOXEPIN HYDROCHLORIDE 50 MG: 25 CAPSULE ORAL at 21:27

## 2019-12-13 RX ADMIN — MELATONIN TAB 3 MG 3 MG: 3 TAB at 21:27

## 2019-12-13 RX ADMIN — DOCUSATE SODIUM 100 MG: 100 CAPSULE, LIQUID FILLED ORAL at 08:27

## 2019-12-13 RX ADMIN — GABAPENTIN 400 MG: 400 CAPSULE ORAL at 14:31

## 2019-12-13 RX ADMIN — LURASIDONE HYDROCHLORIDE 80 MG: 80 TABLET, FILM COATED ORAL at 17:07

## 2019-12-13 RX ADMIN — GABAPENTIN 400 MG: 400 CAPSULE ORAL at 08:27

## 2019-12-13 RX ADMIN — SUCRALFATE 1 G: 1 TABLET ORAL at 11:41

## 2019-12-13 RX ADMIN — SUCRALFATE 1 G: 1 TABLET ORAL at 07:28

## 2019-12-13 RX ADMIN — DOCUSATE SODIUM 100 MG: 100 CAPSULE, LIQUID FILLED ORAL at 17:07

## 2019-12-13 RX ADMIN — CLONIDINE HYDROCHLORIDE 0.1 MG: 0.1 TABLET ORAL at 17:07

## 2019-12-13 RX ADMIN — TRAZODONE HYDROCHLORIDE 200 MG: 100 TABLET ORAL at 21:27

## 2019-12-13 RX ADMIN — SUCRALFATE 1 G: 1 TABLET ORAL at 16:07

## 2019-12-13 RX ADMIN — SUCRALFATE 1 G: 1 TABLET ORAL at 21:28

## 2019-12-13 RX ADMIN — VENLAFAXINE HYDROCHLORIDE 150 MG: 150 CAPSULE, EXTENDED RELEASE ORAL at 08:27

## 2019-12-13 NOTE — CASE MANAGEMENT
Pt is a 48y o  year old, ,  , Female admitted on 12/12/2019  6:38 PM with an admission status ( 201)  after presenting to the ER with complaints of depression, poor sleep and took 10 Tylenol and drank to not wake up  Pt's mental status upon admission was alert, but nausea  Pt lives with her young son and a friend of his in her own home  , at house   Pt has a hx of mental illness including Bipolar, most recently depressed  She has had 2 previous inpatients hospitalizations this year and is non-compliant with her [facility] appointments    Pt has a 8th grade level of education  Pt is disabled  She had worked for years as a CNA here at [employer] has insurance  Pt's current treatment providers include Psychiatrist and Therapist, though she has been non-compliant with her aftercare since October  Chinedu Davis Upon admission pt was taking meds present on admission med rec  Pt's medical history includes   Past Medical History:   Diagnosis Date    Alcoholism ([facility] [address] )     Anxiety     Bipolar disorder (HCC)     Bowel obstruction (HCC)     Gastritis     Head injury     Heart palpitations     Hyperlipidemia     Hypertension     Hypothyroidism     PTSD (post-traumatic stress disorder)     Seizure ([facility] [address] )     Suicide attempt ([facility] [address] )      Pt's current mental status is alert  Pt plans to be discharged to her own home at [address] [place], [place]  with her 34year old son and a friend of his  The primary stressor upon admission was identified as her depression and she doesn't feel the medications were working  She states she has a very limited social [place] and no close friends  Her social outlet is drinking beers with her son  The pt was accompanied upon admission by EMS, she called 911 herself  Special needs identified included abuse/safety related to her increased drinking  Chinedu Davis

## 2019-12-13 NOTE — CONSULTS
Consult- Vitor Erazo 1965, 48 y o  female MRN: [id number]    Unit/Bed#: [facility] 881-23 Encounter: [id number]    Primary Care Provider: Dorn Dakin, CRNP   Date and time admitted to hospital: 12/12/2019  6:38 PM      Inpatient consult for Medical Clearance for [address] patient  Consult performed by: Ernesto Krishna MD  Consult ordered by: Burak Trejo MD          Intentional acetaminophen overdose [facility])  Assessment & Plan  Recently patient ingested large amount of Tylenol along with 4 beers and received N-acetylcysteine protocol    Transaminitis  Assessment & Plan  Patient still has persistent transaminitis  She was initially admitted with acute Tylenol intoxication following which she received N acetyl cystine protocol  Check CMP again tomorrow  If bilirubin continues to go higher will need further imaging of the liver  Patient has known history of cholecystectomy    Alcohol abuse  Assessment & Plan  Counseled on alcohol abstinence    Tobacco abuse  Assessment & Plan  Counseled on smoking cessation and placed on nicotine replacement therapy    Hypercholesteremia  Assessment & Plan  Patient has known history of hyperlipidemia  Hold statin secondary to transaminitis    Acquired hypothyroidism  Assessment & Plan  Continue Synthroid    Essential hypertension  Assessment & Plan  Blood pressure control is optimal at this time  Will discontinue Norvasc for now  Continue clonidine, lisinopril, HCTZ and metoprolol  Will further adjust medications based on blood pressure control        VTE Prophylaxis: Reason for no pharmacologic prophylaxis Encourage ambulation  / reason for no mechanical VTE prophylaxis Encourage ambulation     Recommendations for Discharge:  · Monitor CMP Q weekly for the next 4 weeks    Counseling / Coordination of Care Time: 1 hour  Greater than 50% of total time spent on patient counseling and coordination of care  Collaboration of Care:  Were Recommendations Directly Discussed with Primary Treatment Team? - Yes     History of Present Illness:    Cielo Pizano is a 48 y o  female who is originally admitted to the psychiatry service due to depression  We are consulted for medical management  Patient recently overdosed on a handful of Tylenol and drank 4 beers was admitted and treated and now has been transitioned to inpatient behavioral health treatment  Currently denies any chest pain or shortness of breath or abdominal pain or nausea vomiting or diarrhea  Review of Systems:    Review of Systems   Constitutional: Positive for appetite change and fatigue  Negative for chills and fever  HENT: Negative for hearing loss, sore throat and trouble swallowing  Eyes: Negative for photophobia, discharge and visual disturbance  Respiratory: Negative for chest tightness and shortness of breath  Cardiovascular: Negative for chest pain and palpitations  Gastrointestinal: Negative for abdominal pain, blood in stool and vomiting  Endocrine: Negative for polydipsia and polyuria  Genitourinary: Negative for difficulty urinating, dysuria, flank pain and hematuria  Musculoskeletal: Negative for back pain and gait problem  Skin: Negative for rash  Allergic/Immunologic: Negative for environmental allergies and food allergies  Neurological: Negative for dizziness, seizures, syncope and headaches  Hematological: Does not bruise/bleed easily  Psychiatric/Behavioral: Positive for behavioral problems  The patient is nervous/anxious  All other systems reviewed and are negative        Past Medical and Surgical History:     Past Medical History:   Diagnosis Date    [facility]     Anxiety     Bipolar disorder (HCC)     Bowel obstruction (HCC)     Gastritis     Head injury     Heart palpitations     Hyperlipidemia     Hypertension     Hypothyroidism     PTSD (post-traumatic stress disorder)     [facility]     Suicide attempt [facility])        Past Surgical History: Procedure Laterality Date    ABDOMINAL SURGERY      APPENDECTOMY      BOWEL RESECTION      CHOLECYSTECTOMY      ESOPHAGOGASTRODUODENOSCOPY N/A 5/18/2018    Procedure: ESOPHAGOGASTRODUODENOSCOPY (EGD) with bx;  Surgeon: Gail Wang DO;  Location: [facility]; Service: Gastroenterology    HYSTERECTOMY      KNEE SURGERY Bilateral        Meds/Allergies:    all medications and allergies reviewed    Allergies: Allergies   Allergen Reactions    Latex Rash       Social History:     Marital Status:     Substance Use History:   Social History     Substance and Sexual Activity   Alcohol Use Yes    Alcohol/week: 21 0 standard drinks    Types: 21 Cans of beer per week    Frequency: 2-3 times a week    Drinks per session: 3 or 4    Binge frequency: Weekly    Comment: As of 11/14, reports 7 large beers about 2-3 times a week  Social History     Tobacco Use   Smoking Status Current Every Day Smoker    Packs/day: 1 00    Years: 25 00    Pack years: 25 00    Types: Cigarettes   Smokeless Tobacco Never Used     Social History     Substance and Sexual Activity   Drug Use No       Family History:    Family History   Problem Relation Age of Onset    Diabetes Father        Physical Exam:     Vitals:   Blood Pressure: 107/67 (12/13/19 0728)  Pulse: 89 (12/13/19 0728)  Temperature: 98 2 °F (36 8 °C) (12/13/19 0728)  Temp Source: Temporal (12/13/19 0728)  Respirations: 16 (12/13/19 0728)  Height: 5' 3" (160 cm) (12/12/19 1912)  Weight - Scale: 85 8 kg (189 lb 3 2 oz) (12/12/19 1912)  SpO2: 96 % (12/13/19 0728)    Physical Exam   Constitutional: She is oriented to person, place, and time  She appears well-developed and well-nourished  HENT:   Head: Normocephalic and atraumatic  Right Ear: External ear normal    Left Ear: External ear normal    Mouth/Throat: Oropharynx is clear and moist    Eyes: Pupils are equal, round, and reactive to light   Conjunctivae and EOM are normal    Neck: Normal range of motion  Neck supple  Cardiovascular: Normal rate, regular rhythm, normal heart sounds and intact distal pulses  Pulmonary/Chest: Effort normal and breath sounds normal    Abdominal: Soft  Bowel sounds are normal  She exhibits no mass  There is no tenderness  There is no rebound and no guarding  Genitourinary:   Genitourinary Comments: deferred   Musculoskeletal: Normal range of motion  Neurological: She is alert and oriented to person, place, and time  She has normal reflexes  Cranial nerves 2-12 are normal   Normal neurological exam   Skin: Skin is warm and dry  No rash noted  Psychiatric:   Flat affect   Nursing note and vitals reviewed  Additional Data:     Lab Results: I have personally reviewed pertinent reports  Results from last 7 days   Lab Units 12/12/19  0809  12/11/19  1223   WBC Thousand/uL 8 40   < > 9 50   HEMOGLOBIN g/dL 13 5   < > 16 1*   HEMATOCRIT % 39 0   < > 46 7*   PLATELETS Thousands/uL 254   < > 371   NEUTROS PCT %  --   --  62   LYMPHS PCT %  --   --  27   MONOS PCT %  --   --  10   EOS PCT %  --   --  0    < > = values in this interval not displayed  Results from last 7 days   Lab Units 12/12/19  1304 12/12/19  0809   SODIUM mmol/L  --  133*   POTASSIUM mmol/L  --  3 4*   CHLORIDE mmol/L  --  105   CO2 mmol/L  --  21*   BUN mg/dL  --  9   CREATININE mg/dL  --  0 51*   ANION GAP mmol/L  --  7   CALCIUM mg/dL  --  7 2*   ALBUMIN g/dL 3 4 3 2   TOTAL BILIRUBIN mg/dL 5 10* 4 30*   ALK PHOS U/L 78 75   ALT U/L 130* 133*   AST U/L 124* 130*   GLUCOSE RANDOM mg/dL  --  131*             Lab Results   Component Value Date/Time    HGBA1C 5 4 11/15/2019 05:48 AM    HGBA1C 5 6 05/26/2019 06:26 AM    HGBA1C 5 5 05/17/2018 05:47 AM               Imaging: I have personally reviewed pertinent reports        No orders to display       EKG, Pathology, and Other Studies Reviewed on Admission:   · EKG:  Reviewed    ** Please Note: This note has been constructed using a voice

## 2019-12-13 NOTE — CMS CERTIFICATION NOTE
Certification: Based upon physical, mental and social evaluations, I certify that inpatient psychiatric services are medically necessary for this patient for a duration of 14 midnights for the treatment of depression and anxiety  Available alternative community resources do not meet the patient's mental health care needs  I further attest that an established written individualized plan of care has been implemented and is outlined in the patient's medical records

## 2019-12-13 NOTE — ASSESSMENT & PLAN NOTE
Recently patient ingested large amount of Tylenol along with 4 beers and received N-acetylcysteine protocol

## 2019-12-13 NOTE — PLAN OF CARE
Problem: SELF HARM/SUICIDALITY  Goal: Will have no self-injury during hospital stay  Description  INTERVENTIONS:  - Q 15 MINUTES: Routine safety checks  - Q WAKING SHIFT & PRN: Assess risk to determine if routine checks are adequate to maintain patient safety  - Encourage patient to participate actively in care by formulating a plan to combat response to suicidal ideation, identify supports and resources  Outcome: Not Progressing     Problem: DEPRESSION  Goal: Will be euthymic at discharge  Description  INTERVENTIONS:  - Administer medication as ordered  - Provide emotional support via 1:1 interaction with staff  - Encourage involvement in milieu/groups/activities  - Monitor for social isolation  Outcome: Not Progressing     Problem: ANXIETY  Goal: Will report anxiety at manageable levels  Description  INTERVENTIONS:  - Administer medication as ordered  - Teach and encourage coping skills  - Provide emotional support  - Assess patient/family for anxiety and ability to cope  Outcome: Not Progressing  Goal: By discharge: Patient will verbalize 2 strategies to deal with anxiety  Description  Interventions:  - Identify any obvious source/trigger to anxiety  - Staff will assist patient in applying identified coping technique/skills  - Encourage attendance of scheduled groups and activities  Outcome: Not Progressing     Problem: Alteration in Thoughts and Perception  Goal: Treatment Goal: Gain control of psychotic behaviors/thinking, reduce/eliminate presenting symptoms and demonstrate improved reality functioning upon discharge  Outcome: Not Progressing  Goal: Verbalize thoughts and feelings  Description  Interventions:  - Promote a nonjudgmental and trusting relationship with the patient through active listening and therapeutic communication  - Assess patient's level of functioning, behavior and potential for risk  - Engage patient in 1 on 1 interactions  - Encourage patient to express fears, feelings, frustrations, and discuss symptoms    - [place] patient to reality, help patient recognize reality-based thinking   - Administer medications as ordered and assess for potential side effects  - Provide the patient education related to the signs and symptoms of the illness and desired effects of prescribed medications  Outcome: Not Progressing  Goal: Refrain from acting on delusional thinking/internal stimuli  Description  Interventions:  - Monitor patient closely, per order   - Utilize least restrictive measures   - Set reasonable limits, give positive feedback for acceptable   - Administer medications as ordered and monitor of potential side effects  Outcome: Not Progressing  Goal: Agree to be compliant with medication regime, as prescribed and report medication side effects  Description  Interventions:  - Offer appropriate PRN medication and supervise ingestion; conduct AIMS, as needed   Outcome: Not Progressing  Goal: Attend and participate in unit activities, including therapeutic, recreational, and educational groups  Description  Interventions:  -Encourage Visitation and family involvement in care  Outcome: Not Progressing  Goal: Recognize dysfunctional thoughts, communicate reality-based thoughts at the time of discharge  Description  Interventions:  - Provide medication and psycho-education to assist patient in compliance and developing insight into his/her illness   Outcome: Not Progressing  Goal: Complete daily ADLs, including personal hygiene independently, as able  Description  Interventions:  - Observe, teach, and assist patient with ADLS  - Monitor and promote a balance of rest/activity, with adequate nutrition and elimination   Outcome: Not Progressing     Problem: Ineffective Coping  Goal: Cooperates with admission process  Description  Interventions:   - Complete admission process  Outcome: Not Progressing  Goal: Identifies ineffective coping skills  Outcome: Not Progressing  Goal: Identifies healthy coping skills  Outcome: Not Progressing  Goal: Demonstrates healthy coping skills  Outcome: Not Progressing  Goal: Participates in unit activities  Description  Interventions:  - Provide therapeutic environment   - Provide required programming   - Redirect inappropriate behaviors   Outcome: Not Progressing  Goal: Patient/Family participate in treatment and DC plans  Description  Interventions:  - Provide therapeutic environment  Outcome: Not Progressing  Goal: Patient/Family verbalizes awareness of resources  Outcome: Not Progressing  Goal: Understands least restrictive measures  Description  Interventions:  - Utilize least restrictive behavior  Outcome: Not Progressing  Goal: Free from restraint events  Description  - Utilize least restrictive measures   - Provide behavioral interventions   - Redirect inappropriate behaviors   Outcome: Not Progressing     Problem: Risk for Self Injury/Neglect  Goal: Treatment Goal: Remain safe during length of stay, learn and adopt new coping skills, and be free of self-injurious ideation, impulses and acts at the time of discharge  Outcome: Not Progressing  Goal: Verbalize thoughts and feelings  Description  Interventions:  - Assess and re-assess patient's lethality and potential for self-injury  - Engage patient in 1:1 interactions, daily, for a minimum of 15 minutes  - Encourage patient to express feelings, fears, frustrations, hopes  - Establish rapport/trust with patient   Outcome: Not Progressing  Goal: Refrain from harming self  Description  Interventions:  - Monitor patient closely, per order  - Develop a trusting relationship  - Supervise medication ingestion, monitor effects and side effects   Outcome: Not Progressing     Problem: Depression  Goal: Treatment Goal: Demonstrate behavioral control of depressive symptoms, verbalize feelings of improved mood/affect, and adopt new coping skills prior to discharge  Outcome: Not Progressing  Goal: Refrain from harming self  Description  Interventions:  - Monitor patient closely, per order   - Supervise medication ingestion, monitor effects and side effects   Outcome: Not Progressing  Goal: Refrain from isolation  Description  Interventions:  - Develop a trusting relationship   - Encourage socialization   Outcome: Not Progressing  Goal: Refrain from self-neglect  Outcome: Not Progressing     Problem: Anxiety  Goal: Anxiety is at manageable level  Description  Interventions:  - Assess and monitor patient's anxiety level  - Monitor for signs and symptoms (heart palpitations, chest pain, shortness of breath, headaches, nausea, feeling jumpy, restlessness, irritable, apprehensive)  - Collaborate with interdisciplinary team and initiate plan and interventions as ordered    - [place] patient to unit/surroundings  - Explain treatment plan  - Encourage participation in care  - Encourage verbalization of concerns/fears  - Identify coping mechanisms  - Assist in developing anxiety-reducing skills  - Administer/offer alternative therapies  - Limit or eliminate stimulants  Outcome: Not Progressing

## 2019-12-13 NOTE — CASE MANAGEMENT
12   Case Management Readmission Assessment    Current Admission Date:  12/12/19 Previous Admission - Discharge Date:  11/14/19-11/20/19 Number of Days Between Admissions:   22   Current Admission Diagnosis:  Patient Active Problem List   Diagnosis    Essential hypertension    Acquired hypothyroidism    Gastritis    Chest pain    Uncomplicated alcohol dependence ([facility] )    h/o Seizure ([facility] )    Post-traumatic stress disorder, chronic    Bipolar I disorder, most recent episode depressed, severe without psychotic features ([facility] )    Reported medication overdose self-harm    Heart palpitations    Hypercholesteremia    Tobacco abuse    Generalized anxiety disorder    Transaminitis    Intentional acetaminophen overdose ([facility] )    Alcohol abuse    Suicidal ideation    Previous Discharge Diagnosis:  There are no discharge diagnoses documented for the most recent discharge  Has the patient had more than one readmission in the past 90 days? No      Assess for all Readmissions:    1  Where were you prior to coming to the hospital for this admission? Home/Self-Care   2  What do you feel caused you to come back into the hospital again? "The medicine wasn't working"   3  Did you contact your physician prior to coming to the hospital?  No        Physician name: LENORA   4  Did the physician direct you to come to the Emergency Department? No   5  Were all of your needs addressed before you left the hospital? Yes           Assess for Readmissions from Home, Home with Home Health or AL/PCF/Group Home:    6  Did you have an appointment with your doctor after you were discharged? Yes   7  Were you able to go to the appointment as scheduled?            a   If no, why not? No     Other Non-compliant   8  Were your Discharge Instructions easy to understand? Yes   9  Were you able to get all of your medications?            a   If no, why not?  Yes  Other NA             b   Did you take your medications as they were prescribed?            c,  If no, why not? Yes  Other NA             d   Did you understand the purpose for taking each of your medications? Yes   10  Did you understand how to care for yourself and who to call if you had a                      problem?    Yes     Dixon Arizmendi, MALIKA         December 13, 2019

## 2019-12-13 NOTE — ASSESSMENT & PLAN NOTE
Blood pressure control is optimal at this time  Will discontinue Norvasc for now  Continue clonidine, lisinopril, HCTZ and metoprolol    Will further adjust medications based on blood pressure control

## 2019-12-13 NOTE — PROGRESS NOTES
12/13/19 1445   Team Meeting   Meeting Type Tx Team Meeting   Initial Conference Date 12/13/19   Next Conference Date 01/13/20   Team Members Present   Team Members Present Physician;Nurse;   Physician Team Member   (Dr Allan Pringle)   Nursing Team Member   Jose Nam, MARK ANTHONY)   Care Management Team Member   Delroy Fisher OTR/L)   Patient/Family Present   Patient Present Yes   Patient's Family Present No

## 2019-12-13 NOTE — PROGRESS NOTES
12/13/19 0834   Team Meeting   Meeting Type Daily Rounds   Team Members Present   Team Members Present Physician;Nurse;; Other (Discipline and Name); Occupational Therapist   Physician Team Member Dr Abbey Aly Team Member M  [phone] [facility] Management Team Member N  Tariq Hill  [address]   OT Team Member VERONICA Landis   Other (Discipline and Name) Alivia Draper      Reviewed with team, discussed medications, patient is a new admission status post OD with hx of SA  This is the patients 3rd admission since May of this year  Plan to start medication management

## 2019-12-13 NOTE — CASE MANAGEMENT
Pt reports she has been a  x 7 years and has three sons  The oldest just gave pt her first grandchild, and baby boy, Good Costello is 2 months old,and lives in [place]  Her second son has Schizophrenia and lives outside of her home  Her youngest son lives with her and works and plays [employer] till all hours of the night  She has very limited supports and her parents and sons are her only contacts  She enjoys crocheting and cooking  She earns $1535 mo  SSD  Denies access to firearms, no POA  At time of DC she will call her father to pick her up  She gets her scripts filled at [place] on [address] [facility] and would her scripts e-scripted to them for ease of pick-up  DINORAH:  JEY COOLEY [facility]   Pt signed her IMM Rights

## 2019-12-13 NOTE — ASSESSMENT & PLAN NOTE
Patient still has persistent transaminitis  She was initially admitted with acute Tylenol intoxication following which she received N acetyl cystine protocol  Check CMP again tomorrow  If bilirubin continues to go higher will need further imaging of the liver    Patient has known history of cholecystectomy

## 2019-12-13 NOTE — OCCUPATIONAL THERAPY NOTE
Occupational Therapy Evaluation      Peter Dunaway    12/13/2019    Patient Active Problem List   Diagnosis    Essential hypertension    Acquired hypothyroidism    Gastritis    Chest pain    Uncomplicated alcohol dependence ([facility] )    h/o Seizure ([facility] )    Post-traumatic stress disorder, chronic    Bipolar I disorder, most recent episode depressed, severe without psychotic features ([facility] )    Reported medication overdose self-harm    Heart palpitations    Hypercholesteremia    Tobacco abuse    Generalized anxiety disorder    Transaminitis    Intentional acetaminophen overdose ([facility] )    Alcohol abuse    Suicidal ideation       Past Medical History:   Diagnosis Date    Alcoholism ([place] [address] )     Anxiety     Bipolar disorder ([facility] )     Bowel obstruction ([facility] )     Gastritis     Head injury     Heart palpitations     Hyperlipidemia     Hypertension     Hypothyroidism     PTSD (post-traumatic stress disorder)     Seizure ([place] [address] )     Suicide attempt [facility])        Past Surgical History:   Procedure Laterality Date    ABDOMINAL SURGERY      APPENDECTOMY      BOWEL RESECTION      CHOLECYSTECTOMY      ESOPHAGOGASTRODUODENOSCOPY N/A 5/18/2018    Procedure: ESOPHAGOGASTRODUODENOSCOPY (EGD) with bx;  Surgeon: Trev Nieto DO;  Location: [facility]; Service: Gastroenterology    HYSTERECTOMY      KNEE SURGERY Bilateral        Subjective:  RE: reason for hospitalization: "I'm depressed and I can't sleep " She was resting in her bed when approached for OT assessment  She was willing to engage in the interview process  She stated that there was no specific trigger that contributed to her coming to the hospital     Per her record, she has been transferred from [facility] ICU  She had been admitted due to severe depression and OD on Tylenol PM and alcohol  She reported feeling depressed, hopeless, thoughts of wishing she were dead, took overdose to go to sleep permanently   She reported poor sleep, poor appetite, low energy and interest  She also reports anxiety  She is noted to still drink 4 beers per day a couple of days/ week  Prior Level of Function:  Camila Orosco stated that she plans to return to her own home where she lives with her adult son  She stated that this is a 2 story home with a basement  She is independent in ambulation  She reports that she is OK on steps  She stated that she is independent in her personal care  She stated that she receives disability, does not work (in past, 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)  She stated that she does her own laundry,handles her own money  She stated that she cooks, drives, has a car  She stated that she handles her own medications and takes these as prescribed  Falls:  She denies any falls in the past 6 months  Vision:  She was not wearing glasses at time of assessment  She stated that she should wear glasses, however  Alert & Oriented   She was alert, oriented to person, place, generally to time (she was a few days off), and reason for hospitalization  Hobbies/Interests:  She stated that in the summer she enjoys gardening  She stated that she likes to decorate for the holidays at this time of year  Per , she also likes to nick branch  Support:   She stated that her family is a support  Per  she is noted to be involved with LENORA  Pain:  She denies any pain at this time  R UE AROM WFL's  RUE  Strength F+/G- grasp strength (hand was edematous, nursing is aware and attentive to this)  LUE AROM WFL's  LUE Strength good grasp strength      Current Level of Function:  She is independent in ambulation  She was somber in affect, soft spoken in conversation  She is independent in her personal care  She was generally quiet and her comments were dependent on questions posed to her           Work Task Skills:  Task Investment she did carry out tasks presented to her, her energy investment in the tasks was limited but she did carry out the given tasks  Problem Solving she did recognize errors on 1-2 step whip stitch lace task but did require assistance for error correction   Concentration she was attentive to questions posed during this assessment  Follows Direction She did carry out multistep written instruction and 1, sometimes 1-2 step verbal/ demonstrative whip stitch task  Frustration Tolerance Her frustration tolerance during the interview was at least fair    Social Skills:  Dyadic Interaction (eye contact, makes needs known, goal directed conversation)  Eye contact: Poor  Quality of Response: her comments were relevant but dependent on questions posed to her   Goal Directed: Yes  False Beliefs: Her conversation was goal directed, she did not verbalize false beliefs during her interactions  Assessment performed:    Pt is a 48 y o  female seen for OT evaluation s/p admit to [facility] on 12/12/2019 w/ Bipolar I disorder, most recent episode depressed, severe without psychotic features ([facility] )  Comorbidities affecting pt's functional performance at time of assessment include: HTN and acquied hypothyroidism, hypercholesterolemia, tobacco abuse, generalized anxiety disorder, transaminitis, intentional acetaminophen overdose, alcohol abuse, hx of PTSD  Personal factors affecting pt at time of IE include:behavioral pattern, flat affect, decreased initiation and engagement , health management  and chronic illness, instability of illness, decreasd coping skills, decreased life management skills, limited supports  Prior to admission, pt was living in her own home with her son  Upon evaluation: Pt requires treatment with consideration of the following deficits impacting occupational performance: impaired initiation, impaired problem solving, impulsivity, decreased safety awareness, decreased coping skills and impaired life management skills     From OT standpoint, recommendation at time of d/c would be to further explore needs, I e, possible D & A treatment to be considered if applicable and if she is willing, eventual discharge back to her home with community supports (I e , continued involvement in [address] )  Patient Goal:  "Making myself happy "    Plan: she was encouraged to attend programs on the unit  She stated that she likes various types of groups, does plan to join programs  OT will continue to monitor her status while she is on the OA unit         Group Recommendations:     Exercise  Arts and crafts  Coping skills  Healthy living  Positive focus  Stress management  Life management  Relaxation  Gardening  Relapse prevention  Positive focus  Socialization    Sury Cuevas, OT

## 2019-12-13 NOTE — TREATMENT PLAN
TREATMENT PLAN REVIEW - [address] 48 y o  1965 female MRN: [id number]    [facility] 787-04 Encounter: [id number]        Admit Date/Time:  12/12/2019  6:38 PM    Treatment Team: Attending Provider: Lidia Botello MD; Patient Care Assistant: Andria Sheffield; Registered Nurse: Nik Rawls RN; Occupational Therapy Assistant: Andrew Stringer; Occupational Therapist: Deepika Mccullough, OT; : Piter Ponce, OT; Patient Care Assistant: Bea Almonte    Diagnosis: Principal Problem:    Bipolar I disorder, most recent episode depressed, severe without psychotic features [facility]  Active Problems:    Generalized anxiety disorder    Alcohol abuse    Patient Strengths/Assets: capable of independent living, communication skills, compliant with medication, good past treatment response      Patient Barriers/Limitations: chronic mental illness, difficulty adapting, family conflict, lack of social/family support    Short Term Goals: decrease in depressive symptoms, decrease in anxiety symptoms, decrease in suicidal thoughts    Long Term Goals: improvement in depression, improvement in anxiety, stabilization of mood, free of suicidal thoughts    Progress Towards Goals: starting psychiatric medications as prescribed    Recommended Treatment: medication management, patient medication education, group therapy, milieu therapy, continued Behavioral Health psychiatric evaluation/assessment process     Treatment Frequency: daily medication monitoring, group and milieu therapy daily, monitoring through interdisciplinary rounds, monitoring through weekly patient care conferences    Expected Discharge Date: 14 days - 12/27/2019    Discharge Plan: discharge to family care, referral for outpatient medication management with a psychiatrist, referral for outpatient psychotherapy    Treatment Plan Created/Updated By: Geovani Blount Judy Barnes MD
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
## 2019-12-13 NOTE — DISCHARGE INSTR - OTHER ORDERS
[facility] Phone Number : [phone]    Ann 86 : [phone]      *Provider Appointments:   Walk-in appointments are available Monday-Thursday 9am-11am and Wednesday 1:00-3:00 at [facility]  The address [place], [place] and the telephone number is [phone]  Please refer to handout for what is necessary to take with you  Monthly Support for Persons with Mental Illness  The Peer Support Group is a monthly meeting for individuals facing the challenges of recovering from severe and persistent mental illness  Depression, manic depression, schizophrenia, and general anxiety disorder are only a few of the diagnoses of individuals who have found a supportive place at our meetings  Our [place]  We are a fellowship of individuals who share a common goal of recovery and the ability to maintain mental and emotional stability  We help others and ourselves through sharing our experiences, strength and hope with each other  No matter how traumatic our past or how despairing our present may be, there is hope for a new day  Sessions take place in an intimate, confidential setting to allow individuals to share openly with each other  You'll know that you are not alone  Drop inno registration is necessary  Here are the times and locations  PATRIC  Monthly: 1st Monday, 7:00-8:30 pm  [facility]  [address] [place], [place]   [id number]  Monthly: 3rd Monday, 7:00-8:30 pm  [address], [address]:  If you or someone you know has a drug or alcohol problem, there is help:  [address]: [address]: [phone]  An assessment is the first step     In addition to those listed there are other programs available in the area but assessment is best to determine an appropriate level of care   If you DO NOT have Medical Assistance (MA) or [employer], an assessment can be scheduled at one of these providers:  [facility] [place] Ken[address], [address] [place]  [phone]   [facility] [address] [address] , [place], [address] [place]  33[address] [facility] [address]  [facility] [place], [address]   Step by [address] [facility] [address] , [place], 98 [facility]  [phone]   Treatment Trends  [place]  13[address] [facility] , [place], 98 [facility]  2000 [facility],[address] , [address], [place], [address] [place] [facility] [phone]     If you [address], an assessment can be scheduled at one of these providers:  [place] on Alcohol [facility] Drug Abuse  [address] , [place], 98 [facility]  [address]  [address]olo [address], [address] [place]  [address]  D&A Intake Unit  620 [facility] [address] , 1st Floor, [place], [address]  [address], 300 [facility],6Th Floor, [place], 4420 [facility] [place] [address] , [place], [address] [place]  3314 [facility] [address]  [place], 122 [facility] 57 [facility], [place], [address] 253 [facility] 7[address] [facility] [place], [address]   Step by [address] [facility] [address] , [place], 98 [facility]  [phone]   Treatment Trends  [place]  1320 [facility] , [place], 98 [facility]  2000 [facility],[address] , [address], [place], [address] [phone]     If you [address], an assessment can be scheduled at one of these providers    Please contact these Providers to determine if they are in your network plan:  [facility] D&A Intake Unit  [address] , 1st Floor, [place], [address]  [place] [address] , [place], [address] [place]  [facility] [address] [facility] [address]  [place], 122 [facility]) [place] [address], [place], [address] 75 [facility]  [address] [place] [address] Drive 1316 [facility] [address] , [address], [place], [address] [place] [address]   [facility]  [facility] ([facility], [place], 98 [facility]) offers persons with a mental illness a safe, healing environment to explore their personal and vocational potential and receive support in achieving their goals  Instead of traditional therapy, the work of the house is the rehabilitation  As members contribute meaningful work to the house, they build confidence and a sense of purpose  Be a Member  Its Free  Membership in the [facility] is open to any [place] resident who is 25 or older and has a history of mental illness  Membership is free for life  [facility] Guarantees  · A right to have a place to come  · A right to meaningful relationships  · A right to meaningful work  · A right to belong    [facility]  [address] [facility], [address] [place] [address]  Hours: Monday  Friday: 8 a m   4 p m  With varying hours on holidays    [address] ([place] [address], [place]) provides a stress-free atmosphere for persons 18 and older who have experienced mental health issues  What The [address]  · Games  · Outings  · Holiday gatherings  Recovery  · Employment  · Education  · [employer]  Empowerment  · Helps participants achieve their goals  · Enhances quality of life  · Encourages leadership    The [place]   [address]  Hours: Monday through Friday: 4 p m   9 p m  Saturday: 2 p m   8 p m    Closed Sundays  Varying hours on holidays    Contact [facility] Phone Number : [address] [facility] : [phone]

## 2019-12-13 NOTE — PROGRESS NOTES
Pt attended Positive coping skills: music group  Pt attended 1/3 groups  Blunt affect and did not interact when prompted  Pt stayed for duration of group  Continue to provide therepeutic group support       12/13/19 1400   Activity/Group Checklist   Group Other (Comment)  (Positive coping skills: music)   Attendance Attended   Attendance Duration (min) 31-45   Interactions Did not interact   Affect/Mood Blunted/flat   Goals Achieved Able to listen to others

## 2019-12-13 NOTE — NURSING NOTE
Pt isolative to self in room  Med and meal compliant  Pt appears depressed with flat affect  Rates depression at 8/10 and anxiety 3/4  Will continue to encourage socialization with peers for a healthy recovery
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
## 2019-12-14 LAB
ALBUMIN SERPL BCP-MCNC: 3.4 G/DL (ref 3–5.2)
ALP SERPL-CCNC: 70 U/L (ref 43–122)
ALT SERPL W P-5'-P-CCNC: 84 U/L (ref 9–52)
ANION GAP SERPL CALCULATED.3IONS-SCNC: 7 MMOL/L (ref 5–14)
AST SERPL W P-5'-P-CCNC: 46 U/L (ref 14–36)
BILIRUB SERPL-MCNC: 1.8 MG/DL
BUN SERPL-MCNC: 9 MG/DL (ref 5–25)
CALCIUM SERPL-MCNC: 9 MG/DL (ref 8.4–10.2)
CHLORIDE SERPL-SCNC: 99 MMOL/L (ref 97–108)
CO2 SERPL-SCNC: 31 MMOL/L (ref 22–30)
CREAT SERPL-MCNC: 0.59 MG/DL (ref 0.6–1.2)
GFR SERPL CREATININE-BSD FRML MDRD: 106 ML/MIN/1.73SQ M
GGT SERPL-CCNC: 254 U/L (ref 5–85)
GLUCOSE SERPL-MCNC: 113 MG/DL (ref 70–99)
POTASSIUM SERPL-SCNC: 3.1 MMOL/L (ref 3.6–5)
PROT SERPL-MCNC: 6.2 G/DL (ref 5.9–8.4)
SODIUM SERPL-SCNC: 137 MMOL/L (ref 137–147)

## 2019-12-14 PROCEDURE — 80053 COMPREHEN METABOLIC PANEL: CPT | Performed by: FAMILY MEDICINE

## 2019-12-14 PROCEDURE — 82977 ASSAY OF GGT: CPT | Performed by: FAMILY MEDICINE

## 2019-12-14 RX ADMIN — DOCUSATE SODIUM 100 MG: 100 CAPSULE, LIQUID FILLED ORAL at 08:16

## 2019-12-14 RX ADMIN — LAMOTRIGINE 50 MG: 25 TABLET ORAL at 08:15

## 2019-12-14 RX ADMIN — HYDROCHLOROTHIAZIDE 25 MG: 25 TABLET ORAL at 08:16

## 2019-12-14 RX ADMIN — LURASIDONE HYDROCHLORIDE 80 MG: 80 TABLET, FILM COATED ORAL at 17:09

## 2019-12-14 RX ADMIN — GABAPENTIN 400 MG: 400 CAPSULE ORAL at 14:27

## 2019-12-14 RX ADMIN — NICOTINE POLACRILEX 2 MG: 2 GUM, CHEWING BUCCAL at 17:11

## 2019-12-14 RX ADMIN — FOLIC ACID 1 MG: 1 TABLET ORAL at 08:17

## 2019-12-14 RX ADMIN — DOCUSATE SODIUM 100 MG: 100 CAPSULE, LIQUID FILLED ORAL at 17:09

## 2019-12-14 RX ADMIN — NICOTINE POLACRILEX 2 MG: 2 GUM, CHEWING BUCCAL at 09:51

## 2019-12-14 RX ADMIN — PANTOPRAZOLE SODIUM 40 MG: 40 TABLET, DELAYED RELEASE ORAL at 05:32

## 2019-12-14 RX ADMIN — CLONIDINE HYDROCHLORIDE 0.1 MG: 0.1 TABLET ORAL at 17:09

## 2019-12-14 RX ADMIN — METOPROLOL TARTRATE 50 MG: 50 TABLET, FILM COATED ORAL at 08:17

## 2019-12-14 RX ADMIN — SUCRALFATE 1 G: 1 TABLET ORAL at 21:08

## 2019-12-14 RX ADMIN — TRAZODONE HYDROCHLORIDE 200 MG: 100 TABLET ORAL at 21:08

## 2019-12-14 RX ADMIN — GABAPENTIN 400 MG: 400 CAPSULE ORAL at 08:15

## 2019-12-14 RX ADMIN — SUCRALFATE 1 G: 1 TABLET ORAL at 07:17

## 2019-12-14 RX ADMIN — METOPROLOL TARTRATE 50 MG: 50 TABLET, FILM COATED ORAL at 20:40

## 2019-12-14 RX ADMIN — DOXEPIN HYDROCHLORIDE 50 MG: 25 CAPSULE ORAL at 21:08

## 2019-12-14 RX ADMIN — SUCRALFATE 1 G: 1 TABLET ORAL at 17:09

## 2019-12-14 RX ADMIN — GABAPENTIN 600 MG: 300 CAPSULE ORAL at 21:08

## 2019-12-14 RX ADMIN — LISINOPRIL 20 MG: 20 TABLET ORAL at 08:16

## 2019-12-14 RX ADMIN — VENLAFAXINE HYDROCHLORIDE 225 MG: 75 CAPSULE, EXTENDED RELEASE ORAL at 08:16

## 2019-12-14 RX ADMIN — THIAMINE HCL TAB 100 MG 100 MG: 100 TAB at 08:15

## 2019-12-14 RX ADMIN — MELATONIN TAB 3 MG 3 MG: 3 TAB at 21:09

## 2019-12-14 RX ADMIN — CLONIDINE HYDROCHLORIDE 0.1 MG: 0.1 TABLET ORAL at 08:16

## 2019-12-14 RX ADMIN — SUCRALFATE 1 G: 1 TABLET ORAL at 11:31

## 2019-12-14 RX ADMIN — LEVOTHYROXINE SODIUM 50 MCG: 50 TABLET ORAL at 05:33

## 2019-12-14 RX ADMIN — NICOTINE 1 PATCH: 14 PATCH, EXTENDED RELEASE TRANSDERMAL at 08:17

## 2019-12-14 NOTE — NURSING NOTE
Pt visible in milieu  Isolative to self  Pt presents with flat affect but brightens on approach  Pt reports depression and anxiety  Denies SI/HI  Pt noted ambulating through out the unit at times  No new concerns at this time  VSS

## 2019-12-14 NOTE — NURSING NOTE
Patient slept all night without any signs of distress noted and she is presently in bed sleeping  Compliant with her AM medications  Safety checks ongoing

## 2019-12-14 NOTE — PROGRESS NOTES
Psychiatry Progress Note    Subjective: Interval History     Patient reports that she is feeling Will bit better today than she has on previous days  Reports that her depression feels a little better than it has been days prior  Does endorse that she slept better last evening with increase trazodone dosing  Reports still having some intermittent anxiety however denies any panic type episodes  Patient denying any suicidal ideations or homicidal ideations  Patient with no psychosis  No somatic complaints  Staff reports the patient has been intermittently visible on the unit  Attending groups  Has been cooperative  Ate 50% of breakfast and lunch yesterday 100% of dinner      Behavior over the last 24 hours:  unchanged  Sleep: normal  Appetite: normal  Medication side effects: No  ROS: no complaints    Current medications:    Current Facility-Administered Medications:     aluminum-magnesium hydroxide-simethicone (MYLANTA) 200-200-20 mg/5 mL oral suspension 30 mL, 30 mL, Oral, Q4H PRN, Kaveh Avitia MD    benztropine (COGENTIN) injection 1 mg, 1 mg, Intramuscular, Q6H PRN, Kaveh Avitia MD    benztropine (COGENTIN) tablet 1 mg, 1 mg, Oral, Q6H PRN, Kaveh Avitia MD    cloNIDine (CATAPRES) tablet 0 1 mg, 0 1 mg, Oral, BID, Alta Mcghee MD, 0 1 mg at 12/14/19 0816    docusate sodium (COLACE) capsule 100 mg, 100 mg, Oral, BID, Alta Mcghee MD, 100 mg at 12/14/19 0816    doxepin (SINEquan) capsule 50 mg, 50 mg, Oral, HS, Brandon Guerrero MD, 50 mg at 97/75/76 4777    folic acid (FOLVITE) tablet 1 mg, 1 mg, Oral, Daily, Alta Mcghee MD, 1 mg at 12/14/19 1996    gabapentin (NEURONTIN) capsule 400 mg, 400 mg, Oral, BID, Brandon Guerrero MD, 400 mg at 12/14/19 0815    gabapentin (NEURONTIN) capsule 600 mg, 600 mg, Oral, HS, Brandon Guerrero MD, 600 mg at 12/13/19 2127    haloperidol (HALDOL) tablet 5 mg, 5 mg, Oral, Q8H PRN, Kaveh Avitia MD    hydrochlorothiazide (HYDRODIURIL) tablet 25 mg, 25 mg, Oral, Daily, Alta Mcghee MD, 25 mg at 12/14/19 0816    lamoTRIgine (LaMICtal) tablet 50 mg, 50 mg, Oral, Daily, Holly Montalvo MD, 50 mg at 12/14/19 0815    levothyroxine tablet 50 mcg, 50 mcg, Oral, Daily, Destiney Avendaño MD, 50 mcg at 12/14/19 0533    lisinopril (ZESTRIL) tablet 20 mg, 20 mg, Oral, Daily, Alta Mcghee MD, 20 mg at 12/14/19 0816    LORazepam (ATIVAN) tablet 1 mg, 1 mg, Oral, Q4H PRN, Kristina Lyles MD    lurasidone (LATUDA) tablet 80 mg, 80 mg, Oral, Daily With Jose Ortiz MD, 80 mg at 12/13/19 1707    magnesium hydroxide (MILK OF MAGNESIA) 400 mg/5 mL oral suspension 30 mL, 30 mL, Oral, Daily PRN, Kristina Lyles MD    melatonin tablet 3 mg, 3 mg, Oral, HS, Destiney Avendaño MD, 3 mg at 12/13/19 2127    metoprolol tartrate (LOPRESSOR) tablet 50 mg, 50 mg, Oral, Q12H, Alta Mcghee MD, 50 mg at 12/14/19 0817    nicotine (NICODERM CQ) 14 mg/24hr TD 24 hr patch 1 patch, 1 patch, Transdermal, Daily, Destiney Avendaño MD, 1 patch at 12/14/19 0817    nicotine polacrilex (NICORETTE) gum 2 mg, 2 mg, Oral, Q2H PRN, Kristina Lyles MD    OLANZapine (ZyPREXA) IM injection 10 mg, 10 mg, Intramuscular, BID PRN, Kristina Lyles MD    pantoprazole (PROTONIX) EC tablet 40 mg, 40 mg, Oral, Early Morning, Alta Mcghee MD, 40 mg at 12/14/19 0532    risperiDONE (RisperDAL M-TABS) dispersible tablet 1 mg, 1 mg, Oral, Q8H PRN, Kristina Lyles MD    sucralfate (CARAFATE) tablet 1 g, 1 g, Oral, 4x Daily (AC & HS), Destiney Avendaño MD, 1 g at 12/14/19 0717    thiamine (VITAMIN B1) tablet 100 mg, 100 mg, Oral, Daily, Alta Mcghee MD, 100 mg at 12/14/19 0815    traZODone (DESYREL) tablet 200 mg, 200 mg, Oral, HS, Holly Montalvo MD, 200 mg at 12/13/19 2127    traZODone (DESYREL) tablet 25 mg, 25 mg, Oral, HS PRN, Kristina Lyles MD    venlafaxine [facility] ) 24 hr capsule 225 mg, 225 mg, Oral, Daily, Holly Montalvo MD, 225 mg at 12/14/19 0816    Current Problem List:    Patient Active Problem List   Diagnosis    Essential hypertension    Acquired hypothyroidism    Gastritis    Chest pain    Uncomplicated alcohol dependence ([facility] )    h/o Seizure [facility]    Post-traumatic stress disorder, chronic    Bipolar I disorder, most recent episode depressed, severe without psychotic features ([facility] )    Reported medication overdose self-harm    Heart palpitations    Hypercholesteremia    Tobacco abuse    Generalized anxiety disorder    Transaminitis    Intentional acetaminophen overdose ([facility] )    Alcohol abuse    Suicidal ideation       Problem list reviewed 12/14/19     Objective:     Vital Signs:  Vitals:    12/13/19 0728 12/13/19 1550 12/13/19 2103 12/14/19 0709   BP: 107/67 111/72 139/85 129/84   BP Location: Right arm Left arm Right arm Right arm   Pulse: 89 (!) 124 96 81   Resp: 16 18 18 16   Temp: 98 2 °F (36 8 °C) 98 °F (36 7 °C) 98 4 °F (36 9 °C) 97 8 °F (36 6 °C)   TempSrc: Temporal Temporal Temporal Temporal   SpO2: 96% 99% 99% 94%   Weight:       Height:             Appearance:  age appropriate and casually dressed   Behavior:  normal   Speech:  soft   Mood:  depressed   Affect:  blunted   Thought Process:  normal   Thought Content:  normal   Perceptual Disturbances: None   Risk Potential: none   Sensorium:  person, place, situation and time   Cognition:  intact   Consciousness:  alert and awake    Attention: attention span and concentration were age appropriate   Intellect: average   Insight:  limited   Judgment: limited      Motor Activity: no abnormal movements       I/O Past 24 hours:  I/O last 3 completed shifts:   In: 80 [P O :780]  Out: -   I/O this shift:  In: 380 [P O :380]  Out: -         Labs:  Reviewed 12/14/19    Progress Toward Goals:  Unchanged    Assessment / Plan:     Bipolar I disorder, most recent episode depressed, severe without psychotic features ([facility] )    Recommended Treatment:      Medication changes:  1) continue current treatment plan    Non-pharmacological treatments  1) Continue with group therapy, milieu therapy and occupational therapy  Safety  1) Safety/communication plan established targeting dynamic risk factors above  2) Risks, benefits, and possible side effects of medications explained to patient and patient verbalizes understanding  Counseling / Coordination of Care    Total floor / unit time spent today 20 minutes  Greater than 50% of total time was spent with the patient and / or family counseling and / or coordination of care  A description of the counseling / coordination of care  Patient's Rights, confidentiality and exceptions to confidentiality, use of automated medical record, [facility] staff access to medical record, and consent to treatment reviewed      Jil Mercado PA-C

## 2019-12-14 NOTE — NURSING NOTE
Pt isolative to self in room  Med and meal compliant  Denies pain  Pt states she is feeling "a little better"  Denies SI/HI  Will continue to encourage socialization with peers for a healthy recovery

## 2019-12-15 RX ADMIN — VENLAFAXINE HYDROCHLORIDE 225 MG: 75 CAPSULE, EXTENDED RELEASE ORAL at 08:04

## 2019-12-15 RX ADMIN — PANTOPRAZOLE SODIUM 40 MG: 40 TABLET, DELAYED RELEASE ORAL at 05:47

## 2019-12-15 RX ADMIN — SUCRALFATE 1 G: 1 TABLET ORAL at 16:31

## 2019-12-15 RX ADMIN — THIAMINE HCL TAB 100 MG 100 MG: 100 TAB at 08:03

## 2019-12-15 RX ADMIN — GABAPENTIN 400 MG: 400 CAPSULE ORAL at 13:57

## 2019-12-15 RX ADMIN — NICOTINE 1 PATCH: 14 PATCH, EXTENDED RELEASE TRANSDERMAL at 08:04

## 2019-12-15 RX ADMIN — GABAPENTIN 600 MG: 300 CAPSULE ORAL at 21:11

## 2019-12-15 RX ADMIN — HYDROCHLOROTHIAZIDE 25 MG: 25 TABLET ORAL at 08:03

## 2019-12-15 RX ADMIN — LEVOTHYROXINE SODIUM 50 MCG: 50 TABLET ORAL at 05:47

## 2019-12-15 RX ADMIN — SUCRALFATE 1 G: 1 TABLET ORAL at 21:11

## 2019-12-15 RX ADMIN — METOPROLOL TARTRATE 50 MG: 50 TABLET, FILM COATED ORAL at 08:03

## 2019-12-15 RX ADMIN — FOLIC ACID 1 MG: 1 TABLET ORAL at 08:03

## 2019-12-15 RX ADMIN — DOXEPIN HYDROCHLORIDE 50 MG: 25 CAPSULE ORAL at 21:11

## 2019-12-15 RX ADMIN — CLONIDINE HYDROCHLORIDE 0.1 MG: 0.1 TABLET ORAL at 08:04

## 2019-12-15 RX ADMIN — CLONIDINE HYDROCHLORIDE 0.1 MG: 0.1 TABLET ORAL at 17:23

## 2019-12-15 RX ADMIN — LURASIDONE HYDROCHLORIDE 80 MG: 80 TABLET, FILM COATED ORAL at 16:31

## 2019-12-15 RX ADMIN — SUCRALFATE 1 G: 1 TABLET ORAL at 10:58

## 2019-12-15 RX ADMIN — DOCUSATE SODIUM 100 MG: 100 CAPSULE, LIQUID FILLED ORAL at 17:24

## 2019-12-15 RX ADMIN — DOCUSATE SODIUM 100 MG: 100 CAPSULE, LIQUID FILLED ORAL at 08:04

## 2019-12-15 RX ADMIN — LAMOTRIGINE 50 MG: 25 TABLET ORAL at 08:03

## 2019-12-15 RX ADMIN — METOPROLOL TARTRATE 50 MG: 50 TABLET, FILM COATED ORAL at 21:11

## 2019-12-15 RX ADMIN — NICOTINE POLACRILEX 2 MG: 2 GUM, CHEWING BUCCAL at 13:21

## 2019-12-15 RX ADMIN — GABAPENTIN 400 MG: 400 CAPSULE ORAL at 08:03

## 2019-12-15 RX ADMIN — MELATONIN TAB 3 MG 3 MG: 3 TAB at 21:11

## 2019-12-15 RX ADMIN — LISINOPRIL 20 MG: 20 TABLET ORAL at 08:03

## 2019-12-15 RX ADMIN — TRAZODONE HYDROCHLORIDE 200 MG: 100 TABLET ORAL at 21:11

## 2019-12-15 RX ADMIN — SUCRALFATE 1 G: 1 TABLET ORAL at 07:04

## 2019-12-15 NOTE — PLAN OF CARE
Problem: SELF HARM/SUICIDALITY  Goal: Will have no self-injury during hospital stay  Description  INTERVENTIONS:  - Q 15 MINUTES: Routine safety checks  - Q WAKING SHIFT & PRN: Assess risk to determine if routine checks are adequate to maintain patient safety  - Encourage patient to participate actively in care by formulating a plan to combat response to suicidal ideation, identify supports and resources  Outcome: Progressing     Problem: DEPRESSION  Goal: Will be euthymic at discharge  Description  INTERVENTIONS:  - Administer medication as ordered  - Provide emotional support via 1:1 interaction with staff  - Encourage involvement in milieu/groups/activities  - Monitor for social isolation  Outcome: Progressing     Problem: ANXIETY  Goal: Will report anxiety at manageable levels  Description  INTERVENTIONS:  - Administer medication as ordered  - Teach and encourage coping skills  - Provide emotional support  - Assess patient/family for anxiety and ability to cope  Outcome: Progressing  Goal: By discharge: Patient will verbalize 2 strategies to deal with anxiety  Description  Interventions:  - Identify any obvious source/trigger to anxiety  - Staff will assist patient in applying identified coping technique/skills  - Encourage attendance of scheduled groups and activities  Outcome: Progressing     Problem: Alteration in Thoughts and Perception  Goal: Treatment Goal: Gain control of psychotic behaviors/thinking, reduce/eliminate presenting symptoms and demonstrate improved reality functioning upon discharge  Outcome: Progressing  Goal: Verbalize thoughts and feelings  Description  Interventions:  - Promote a nonjudgmental and trusting relationship with the patient through active listening and therapeutic communication  - Assess patient's level of functioning, behavior and potential for risk  - Engage patient in 1 on 1 interactions  - Encourage patient to express fears, feelings, frustrations, and discuss symptoms - [place] patient to reality, help patient recognize reality-based thinking   - Administer medications as ordered and assess for potential side effects  - Provide the patient education related to the signs and symptoms of the illness and desired effects of prescribed medications  Outcome: Progressing  Goal: Refrain from acting on delusional thinking/internal stimuli  Description  Interventions:  - Monitor patient closely, per order   - Utilize least restrictive measures   - Set reasonable limits, give positive feedback for acceptable   - Administer medications as ordered and monitor of potential side effects  Outcome: Progressing  Goal: Agree to be compliant with medication regime, as prescribed and report medication side effects  Description  Interventions:  - Offer appropriate PRN medication and supervise ingestion; conduct AIMS, as needed   Outcome: Progressing  Goal: Attend and participate in unit activities, including therapeutic, recreational, and educational groups  Description  Interventions:  -Encourage Visitation and family involvement in care  Outcome: Progressing  Goal: Recognize dysfunctional thoughts, communicate reality-based thoughts at the time of discharge  Description  Interventions:  - Provide medication and psycho-education to assist patient in compliance and developing insight into his/her illness   Outcome: Progressing  Goal: Complete daily ADLs, including personal hygiene independently, as able  Description  Interventions:  - Observe, teach, and assist patient with ADLS  - Monitor and promote a balance of rest/activity, with adequate nutrition and elimination   Outcome: Progressing     Problem: Ineffective Coping  Goal: Cooperates with admission process  Description  Interventions:   - Complete admission process  Outcome: Progressing  Goal: Identifies ineffective coping skills  Outcome: Progressing  Goal: Identifies healthy coping skills  Outcome: Progressing  Goal: Demonstrates healthy coping skills  Outcome: Progressing  Goal: Participates in unit activities  Description  Interventions:  - Provide therapeutic environment   - Provide required programming   - Redirect inappropriate behaviors   Outcome: Progressing  Goal: Patient/Family participate in treatment and DC plans  Description  Interventions:  - Provide therapeutic environment  Outcome: Progressing  Goal: Patient/Family verbalizes awareness of resources  Outcome: Progressing  Goal: Understands least restrictive measures  Description  Interventions:  - Utilize least restrictive behavior  Outcome: Progressing  Goal: Free from restraint events  Description  - Utilize least restrictive measures   - Provide behavioral interventions   - Redirect inappropriate behaviors   Outcome: Progressing     Problem: Risk for Self Injury/Neglect  Goal: Treatment Goal: Remain safe during length of stay, learn and adopt new coping skills, and be free of self-injurious ideation, impulses and acts at the time of discharge  Outcome: Progressing  Goal: Verbalize thoughts and feelings  Description  Interventions:  - Assess and re-assess patient's lethality and potential for self-injury  - Engage patient in 1:1 interactions, daily, for a minimum of 15 minutes  - Encourage patient to express feelings, fears, frustrations, hopes  - Establish rapport/trust with patient   Outcome: Progressing  Goal: Refrain from harming self  Description  Interventions:  - Monitor patient closely, per order  - Develop a trusting relationship  - Supervise medication ingestion, monitor effects and side effects   Outcome: Progressing     Problem: Depression  Goal: Treatment Goal: Demonstrate behavioral control of depressive symptoms, verbalize feelings of improved mood/affect, and adopt new coping skills prior to discharge  Outcome: Progressing  Goal: Refrain from harming self  Description  Interventions:  - Monitor patient closely, per order   - Supervise medication ingestion, monitor effects and side effects   Outcome: Progressing  Goal: Refrain from isolation  Description  Interventions:  - Develop a trusting relationship   - Encourage socialization   Outcome: Progressing  Goal: Refrain from self-neglect  Outcome: Progressing     Problem: Anxiety  Goal: Anxiety is at manageable level  Description  Interventions:  - Assess and monitor patient's anxiety level  - Monitor for signs and symptoms (heart palpitations, chest pain, shortness of breath, headaches, nausea, feeling jumpy, restlessness, irritable, apprehensive)  - Collaborate with interdisciplinary team and initiate plan and interventions as ordered    - [place] patient to unit/surroundings  - Explain treatment plan  - Encourage participation in care  - Encourage verbalization of concerns/fears  - Identify coping mechanisms  - Assist in developing anxiety-reducing skills  - Administer/offer alternative therapies  - Limit or eliminate stimulants  Outcome: Progressing     Problem: DISCHARGE PLANNING  Goal: Discharge to home or other facility with appropriate resources  Description  INTERVENTIONS:  - Identify barriers to discharge w/patient and caregiver  - Arrange for needed discharge resources and transportation as appropriate  - Identify discharge learning needs (meds, wound care, etc )  - Arrange for interpretive services to assist at discharge as needed  - Refer to Case Management Department for coordinating discharge planning if the patient needs post-hospital services based on physician/advanced practitioner order or complex needs related to functional status, cognitive ability, or social support system  Outcome: Progressing

## 2019-12-15 NOTE — NURSING NOTE
Patient slept with > 6 hours of sleep  Patient was free from falls   Q7 Minute Safety Checks were maintained

## 2019-12-15 NOTE — PLAN OF CARE
Problem: SELF HARM/SUICIDALITY  Goal: Will have no self-injury during hospital stay  Description  INTERVENTIONS:  - Q 15 MINUTES: Routine safety checks  - Q WAKING SHIFT & PRN: Assess risk to determine if routine checks are adequate to maintain patient safety  - Encourage patient to participate actively in care by formulating a plan to combat response to suicidal ideation, identify supports and resources  Outcome: Progressing     Problem: DEPRESSION  Goal: Will be euthymic at discharge  Description  INTERVENTIONS:  - Administer medication as ordered  - Provide emotional support via 1:1 interaction with staff  - Encourage involvement in milieu/groups/activities  - Monitor for social isolation  Outcome: Progressing     Problem: ANXIETY  Goal: Will report anxiety at manageable levels  Description  INTERVENTIONS:  - Administer medication as ordered  - Teach and encourage coping skills  - Provide emotional support  - Assess patient/family for anxiety and ability to cope  Outcome: Progressing  Goal: By discharge: Patient will verbalize 2 strategies to deal with anxiety  Description  Interventions:  - Identify any obvious source/trigger to anxiety  - Staff will assist patient in applying identified coping technique/skills  - Encourage attendance of scheduled groups and activities  Outcome: Progressing     Problem: Alteration in Thoughts and Perception  Goal: Treatment Goal: Gain control of psychotic behaviors/thinking, reduce/eliminate presenting symptoms and demonstrate improved reality functioning upon discharge  Outcome: Progressing  Goal: Verbalize thoughts and feelings  Description  Interventions:  - Promote a nonjudgmental and trusting relationship with the patient through active listening and therapeutic communication  - Assess patient's level of functioning, behavior and potential for risk  - Engage patient in 1 on 1 interactions  - Encourage patient to express fears, feelings, frustrations, and discuss symptoms - [place] patient to reality, help patient recognize reality-based thinking   - Administer medications as ordered and assess for potential side effects  - Provide the patient education related to the signs and symptoms of the illness and desired effects of prescribed medications  Outcome: Progressing  Goal: Refrain from acting on delusional thinking/internal stimuli  Description  Interventions:  - Monitor patient closely, per order   - Utilize least restrictive measures   - Set reasonable limits, give positive feedback for acceptable   - Administer medications as ordered and monitor of potential side effects  Outcome: Progressing  Goal: Agree to be compliant with medication regime, as prescribed and report medication side effects  Description  Interventions:  - Offer appropriate PRN medication and supervise ingestion; conduct AIMS, as needed   Outcome: Progressing  Goal: Attend and participate in unit activities, including therapeutic, recreational, and educational groups  Description  Interventions:  -Encourage Visitation and family involvement in care  Outcome: Progressing  Goal: Recognize dysfunctional thoughts, communicate reality-based thoughts at the time of discharge  Description  Interventions:  - Provide medication and psycho-education to assist patient in compliance and developing insight into his/her illness   Outcome: Progressing  Goal: Complete daily ADLs, including personal hygiene independently, as able  Description  Interventions:  - Observe, teach, and assist patient with ADLS  - Monitor and promote a balance of rest/activity, with adequate nutrition and elimination   Outcome: Progressing     Problem: Ineffective Coping  Goal: Cooperates with admission process  Description  Interventions:   - Complete admission process  Outcome: Progressing  Goal: Identifies ineffective coping skills  Outcome: Progressing  Goal: Identifies healthy coping skills  Outcome: Progressing  Goal: Demonstrates healthy coping skills  Outcome: Progressing  Goal: Participates in unit activities  Description  Interventions:  - Provide therapeutic environment   - Provide required programming   - Redirect inappropriate behaviors   Outcome: Progressing  Goal: Patient/Family participate in treatment and DC plans  Description  Interventions:  - Provide therapeutic environment  Outcome: Progressing  Goal: Patient/Family verbalizes awareness of resources  Outcome: Progressing  Goal: Understands least restrictive measures  Description  Interventions:  - Utilize least restrictive behavior  Outcome: Progressing  Goal: Free from restraint events  Description  - Utilize least restrictive measures   - Provide behavioral interventions   - Redirect inappropriate behaviors   Outcome: Progressing     Problem: Risk for Self Injury/Neglect  Goal: Treatment Goal: Remain safe during length of stay, learn and adopt new coping skills, and be free of self-injurious ideation, impulses and acts at the time of discharge  Outcome: Progressing  Goal: Verbalize thoughts and feelings  Description  Interventions:  - Assess and re-assess patient's lethality and potential for self-injury  - Engage patient in 1:1 interactions, daily, for a minimum of 15 minutes  - Encourage patient to express feelings, fears, frustrations, hopes  - Establish rapport/trust with patient   Outcome: Progressing  Goal: Refrain from harming self  Description  Interventions:  - Monitor patient closely, per order  - Develop a trusting relationship  - Supervise medication ingestion, monitor effects and side effects   Outcome: Progressing     Problem: Depression  Goal: Treatment Goal: Demonstrate behavioral control of depressive symptoms, verbalize feelings of improved mood/affect, and adopt new coping skills prior to discharge  Outcome: Progressing  Goal: Refrain from harming self  Description  Interventions:  - Monitor patient closely, per order   - Supervise medication ingestion, monitor effects and side effects   Outcome: Progressing  Goal: Refrain from isolation  Description  Interventions:  - Develop a trusting relationship   - Encourage socialization   Outcome: Progressing  Goal: Refrain from self-neglect  Outcome: Progressing     Problem: Anxiety  Goal: Anxiety is at manageable level  Description  Interventions:  - Assess and monitor patient's anxiety level  - Monitor for signs and symptoms (heart palpitations, chest pain, shortness of breath, headaches, nausea, feeling jumpy, restlessness, irritable, apprehensive)  - Collaborate with interdisciplinary team and initiate plan and interventions as ordered    - [place] patient to unit/surroundings  - Explain treatment plan  - Encourage participation in care  - Encourage verbalization of concerns/fears  - Identify coping mechanisms  - Assist in developing anxiety-reducing skills  - Administer/offer alternative therapies  - Limit or eliminate stimulants  Outcome: Progressing     Problem: DISCHARGE PLANNING  Goal: Discharge to home or other facility with appropriate resources  Description  INTERVENTIONS:  - Identify barriers to discharge w/patient and caregiver  - Arrange for needed discharge resources and transportation as appropriate  - Identify discharge learning needs (meds, wound care, etc )  - Arrange for interpretive services to assist at discharge as needed  - Refer to Case Management Department for coordinating discharge planning if the patient needs post-hospital services based on physician/advanced practitioner order or complex needs related to functional status, cognitive ability, or social support system  Outcome: Progressing

## 2019-12-15 NOTE — PROGRESS NOTES
Pt is visible on unit, isolative to self  Pt is occasionally social with [place]-speaking staff  Med and meal compliant  Denies pain  Rates depression at 6/10 and anxiety 1/4  Will continue to encourage socialization with peers for a healthy recovery

## 2019-12-15 NOTE — NURSING NOTE
Patient was isolative to her room this evening  She was brief, and flat in conversation  She was cooperative with taking her evening medications  She denied having any depression, anxiety, hallucinations, and suicidal/homicidal thoughts  Safety plan was reviewed with the patient and staff availability was reinforced

## 2019-12-15 NOTE — PROGRESS NOTES
Psychiatry Progress Note    Subjective: Interval History     Patient friendly during assessment this morning  Again reporting that she is feeling better for his 2nd morning in a row  Reports that overall she had a better day yesterday she felt her depression and anxiety were better controlled  Staff does confirm that patient did have a slightly brighter affect and was more visible on the unit however remained withdrawn to herself  Patient continues to deny any suicidal ideations  Patient with no mood lability or homicidal ideations  Patient with no psychosis  Medication and meal compliant  Tolerating her medications with no reported or observed adverse effects      Behavior over the last 24 hours:  Mild improvement  Sleep: normal  Appetite: normal  Medication side effects: No  ROS: no complaints    Current medications:    Current Facility-Administered Medications:     aluminum-magnesium hydroxide-simethicone (MYLANTA) 200-200-20 mg/5 mL oral suspension 30 mL, 30 mL, Oral, Q4H PRN, Nel Jones MD    benztropine (COGENTIN) injection 1 mg, 1 mg, Intramuscular, Q6H PRN, Nel Jones MD    benztropine (COGENTIN) tablet 1 mg, 1 mg, Oral, Q6H PRN, Nel Jones MD    cloNIDine (CATAPRES) tablet 0 1 mg, 0 1 mg, Oral, BID, Alta Mcghee MD, 0 1 mg at 12/15/19 0804    docusate sodium (COLACE) capsule 100 mg, 100 mg, Oral, BID, Alta Mcghee MD, 100 mg at 12/15/19 0804    doxepin (SINEquan) capsule 50 mg, 50 mg, Oral, HS, Carmelita Doshi MD, 50 mg at 44/48/29 0849    folic acid (FOLVITE) tablet 1 mg, 1 mg, Oral, Daily, Alta Mcghee MD, 1 mg at 12/15/19 0803    gabapentin (NEURONTIN) capsule 400 mg, 400 mg, Oral, BID, Carmelita Doshi MD, 400 mg at 12/15/19 0803    gabapentin (NEURONTIN) capsule 600 mg, 600 mg, Oral, HS, Carmelita Doshi MD, 600 mg at 12/14/19 2108    haloperidol (HALDOL) tablet 5 mg, 5 mg, Oral, Q8H PRN, Nel Jones MD    hydrochlorothiazide (HYDRODIURIL) tablet 25 mg, 25 mg, Oral, Daily, Alta Mcghee MD, 25 mg at 12/15/19 0803    lamoTRIgine (LaMICtal) tablet 50 mg, 50 mg, Oral, Daily, Valencia Llanos MD, 50 mg at 12/15/19 0803    levothyroxine tablet 50 mcg, 50 mcg, Oral, Daily, Marry Roberts MD, 50 mcg at 12/15/19 0547    lisinopril (ZESTRIL) tablet 20 mg, 20 mg, Oral, Daily, Alta Mcghee MD, 20 mg at 12/15/19 0803    LORazepam (ATIVAN) tablet 1 mg, 1 mg, Oral, Q4H PRN, Shruthi Torres MD    lurasidone (LATUDA) tablet 80 mg, 80 mg, Oral, Daily With Ann Marie Tobias MD, 80 mg at 12/14/19 1709    magnesium hydroxide (MILK OF MAGNESIA) 400 mg/5 mL oral suspension 30 mL, 30 mL, Oral, Daily PRN, Shruthi Torres MD    melatonin tablet 3 mg, 3 mg, Oral, HS, Marry Roberts MD, 3 mg at 12/14/19 2109    metoprolol tartrate (LOPRESSOR) tablet 50 mg, 50 mg, Oral, Q12H, Alta Mcghee MD, 50 mg at 12/15/19 0803    nicotine (NICODERM CQ) 14 mg/24hr TD 24 hr patch 1 patch, 1 patch, Transdermal, Daily, Marry Roberts MD, 1 patch at 12/15/19 0804    nicotine polacrilex (NICORETTE) gum 2 mg, 2 mg, Oral, Q2H PRN, Shruthi Torres MD, 2 mg at 12/14/19 1711    OLANZapine (ZyPREXA) IM injection 10 mg, 10 mg, Intramuscular, BID PRN, Shruthi Torres MD    pantoprazole (PROTONIX) EC tablet 40 mg, 40 mg, Oral, Early Morning, Alta Mcghee MD, 40 mg at 12/15/19 0547    risperiDONE (RisperDAL M-TABS) dispersible tablet 1 mg, 1 mg, Oral, Q8H PRN, Shruthi Torres MD    sucralfate (CARAFATE) tablet 1 g, 1 g, Oral, 4x Daily (AC & HS), Marry Roberts MD, 1 g at 12/15/19 0704    thiamine (VITAMIN B1) tablet 100 mg, 100 mg, Oral, Daily, Alta Mcghee MD, 100 mg at 12/15/19 0803    traZODone (DESYREL) tablet 200 mg, 200 mg, Oral, HS, Valencia Llanos MD, 200 mg at 12/14/19 2108    traZODone (DESYREL) tablet 25 mg, 25 mg, Oral, HS PRN, Shruthi Torres MD    venlafaxine [facility] ) 24 hr capsule 225 mg, 225 mg, Oral, Daily, Valencia Llanos MD, 225 mg at 12/15/19 0804    Current Problem List:    Patient Active Problem List   Diagnosis    Essential hypertension    Acquired hypothyroidism    Gastritis    Chest pain    Uncomplicated alcohol dependence ([facility] [address]    h/o Seizure ([facility] [address]    Post-traumatic stress disorder, chronic    Bipolar I disorder, most recent episode depressed, severe without psychotic features ([facility] [address]    Reported medication overdose self-harm    Heart palpitations    Hypercholesteremia    Tobacco abuse    Generalized anxiety disorder    Transaminitis    Intentional acetaminophen overdose ([facility] [address] [facility]    Alcohol abuse    Suicidal ideation       Problem list reviewed 12/15/19     Objective:     Vital Signs:  Vitals:    12/14/19 0709 12/14/19 1533 12/14/19 2057 12/15/19 0656   BP: 129/84 130/69 149/76 129/66   BP Location: Right arm Right arm Left arm Right arm   Pulse: 81 80 66 69   Resp: 16 16 18 16   Temp: 97 8 °F (36 6 °C) 98 6 °F (37 °C) 97 9 °F (36 6 °C) 97 5 °F (36 4 °C)   TempSrc: Temporal Temporal Temporal Temporal   SpO2: 94% 96% 96% 97%   Weight:       Height:             Appearance:  age appropriate and casually dressed   Behavior:  normal   Speech:  soft   Mood:  depressed   Affect:  blunted   Thought Process:  normal   Thought Content:  normal   Perceptual Disturbances: None   Risk Potential: none   Sensorium:  person, place, situation and time   Cognition:  intact   Consciousness:  alert and awake    Attention: attention span and concentration were age appropriate   Intellect: average   Insight:  limited   Judgment: limited      Motor Activity: no abnormal movements       I/O Past 24 hours:  I/O last 3 completed shifts: In: 200 [P O :980]  Out: -   No intake/output data recorded          Labs:  Reviewed 12/15/19    Progress Toward Goals:  Mild improvement    Assessment / Plan:     Bipolar I disorder, most recent episode depressed, severe without psychotic features ([facility] )    Recommended Treatment:      Medication changes:  1) continue current treatment plan    Non-pharmacological treatments  1) Continue with group therapy, milieu therapy and occupational therapy  Safety  1) Safety/communication plan established targeting dynamic risk factors above  2) Risks, benefits, and possible side effects of medications explained to patient and patient verbalizes understanding  Counseling / Coordination of Care    Total floor / unit time spent today 20 minutes  Greater than 50% of total time was spent with the patient and / or family counseling and / or coordination of care  A description of the counseling / coordination of care  Patient's Rights, confidentiality and exceptions to confidentiality, use of automated medical record, [facility] staff access to medical record, and consent to treatment reviewed      Melani Elliott PA-C

## 2019-12-16 PROCEDURE — 99232 SBSQ HOSP IP/OBS MODERATE 35: CPT | Performed by: PSYCHIATRY & NEUROLOGY

## 2019-12-16 PROCEDURE — 97530 THERAPEUTIC ACTIVITIES: CPT

## 2019-12-16 RX ADMIN — HYDROCHLOROTHIAZIDE 25 MG: 25 TABLET ORAL at 08:10

## 2019-12-16 RX ADMIN — METOPROLOL TARTRATE 50 MG: 50 TABLET, FILM COATED ORAL at 21:01

## 2019-12-16 RX ADMIN — LAMOTRIGINE 50 MG: 25 TABLET ORAL at 08:09

## 2019-12-16 RX ADMIN — SUCRALFATE 1 G: 1 TABLET ORAL at 17:14

## 2019-12-16 RX ADMIN — THIAMINE HCL TAB 100 MG 100 MG: 100 TAB at 08:09

## 2019-12-16 RX ADMIN — SUCRALFATE 1 G: 1 TABLET ORAL at 11:38

## 2019-12-16 RX ADMIN — GABAPENTIN 400 MG: 400 CAPSULE ORAL at 08:10

## 2019-12-16 RX ADMIN — GABAPENTIN 400 MG: 400 CAPSULE ORAL at 13:52

## 2019-12-16 RX ADMIN — DOXEPIN HYDROCHLORIDE 50 MG: 25 CAPSULE ORAL at 21:01

## 2019-12-16 RX ADMIN — SUCRALFATE 1 G: 1 TABLET ORAL at 06:00

## 2019-12-16 RX ADMIN — TRAZODONE HYDROCHLORIDE 200 MG: 100 TABLET ORAL at 21:01

## 2019-12-16 RX ADMIN — NICOTINE 1 PATCH: 14 PATCH, EXTENDED RELEASE TRANSDERMAL at 08:10

## 2019-12-16 RX ADMIN — SUCRALFATE 1 G: 1 TABLET ORAL at 21:01

## 2019-12-16 RX ADMIN — LEVOTHYROXINE SODIUM 50 MCG: 50 TABLET ORAL at 06:00

## 2019-12-16 RX ADMIN — VENLAFAXINE HYDROCHLORIDE 225 MG: 75 CAPSULE, EXTENDED RELEASE ORAL at 08:09

## 2019-12-16 RX ADMIN — LURASIDONE HYDROCHLORIDE 80 MG: 80 TABLET, FILM COATED ORAL at 17:14

## 2019-12-16 RX ADMIN — PANTOPRAZOLE SODIUM 40 MG: 40 TABLET, DELAYED RELEASE ORAL at 06:00

## 2019-12-16 RX ADMIN — LISINOPRIL 20 MG: 20 TABLET ORAL at 08:10

## 2019-12-16 RX ADMIN — DOCUSATE SODIUM 100 MG: 100 CAPSULE, LIQUID FILLED ORAL at 08:09

## 2019-12-16 RX ADMIN — CLONIDINE HYDROCHLORIDE 0.1 MG: 0.1 TABLET ORAL at 08:10

## 2019-12-16 RX ADMIN — FOLIC ACID 1 MG: 1 TABLET ORAL at 08:09

## 2019-12-16 RX ADMIN — CLONIDINE HYDROCHLORIDE 0.1 MG: 0.1 TABLET ORAL at 17:14

## 2019-12-16 RX ADMIN — GABAPENTIN 600 MG: 300 CAPSULE ORAL at 21:02

## 2019-12-16 RX ADMIN — METOPROLOL TARTRATE 50 MG: 50 TABLET, FILM COATED ORAL at 08:10

## 2019-12-16 NOTE — NURSING NOTE
Patient signed 72 hr notice of intent to withdraw from treatment today at 1247  Dr Edward Cabezas notified of same  Patient was compliant with medications and meals  Appetite is good with patient eating 100% of most meals  Patient is visible and social on the unit  Discussed her initial reason for admission and patient reported she was just having trouble sleeping and it was not a suicide attempt when she took too much Tylenol PM   Patient advised that the Psychiatrist would speak with her regarding discharge and her 72 hr notice  No other issues noted at this time

## 2019-12-16 NOTE — OCCUPATIONAL THERAPY NOTE
Occupational Therapy Activity Treatment Note      Derick Hein    12/16/2019    Patient Active Problem List   Diagnosis    Essential hypertension    Acquired hypothyroidism    Gastritis    Chest pain    [facility]com[facility]    [facility]    Post-traumatic stress disorder, chronic    Bipolar I disorder, most recent episode depressed, severe without psychotic features [facility]    Reported medication overdose self-harm    Heart palpitations    Hypercholesteremia    Tobacco abuse    Generalized anxiety disorder    Transaminitis    [facility]aminophen overdose [facility]    Alcohol abuse    Suicidal ideation       Past Medical History:   Diagnosis Date    [facility]ism [facility]     Anxiety     B[facility] disorder [facility]     Bowel obstruction ([facility]     Gastritis     Head injury     Heart palpitations     Hyperlipidemia     Hypertension     Hypothyroidism     PTSD (post-traumatic stress disorder)     Seizure [facility]     Suicide attempt [facility])        Past Surgical History:   Procedure Laterality Date    ABDOMINAL SURGERY      APPENDECTOMY      BOWEL RESECTION      CHOLECYSTECTOMY      ESOPHAGOGASTRODUODENOSCOPY N/A 5/18/2018    Procedure: ESOPHAGOGASTRODUODENOSCOPY (EGD) with bx;  Surgeon: Romana Barth, DO;  Location: [facility]; Service: Gastroenterology    HYSTERECTOMY      KNEE SURGERY Bilateral         12/16/19 1342   Assessment   Assessment Kristin Nolen was attentive, engaged in OT activity  She explored the benefits of journal keeping  She was provided with a blank journal and handouts on different ways to journal  She was quiet, she did at times interject brief comments  She did share that she planned to be leaving in the next few days  She stated that she had signed a  72 hour notice, she felt good about this and stated that she did not want to be in the hospital over the holidays  She also stated that she was feeling better, she had just talked with her son   She did discuss the benefits of gratitude to promote a more positive focus, she stated that she was appreciative of her son  Progress has been consistent towards her goals  Continue to promote positive focus on the living unit  Goals   Long Term Goal #1 Identify and explore strategies to promote improved functioning and wellness  Long Term Goal #2 Demonstrate 60 minutes of stable mood per session  Long Term Goal 3  identify 3 positive qualities of self  4  Consistently utilize positive coping skills to deal with feelings of depression without interference from potentially self harming behaviors 100% of the time  5  Consistenly utilize positive life management strategies to promote optimum health and wellness 100% of the time  Plan   Treatment Interventions ADL retraining;Continued evaluation; Activityengagement  (coping skills, life management skills)   Goal Expiration Date 01/12/20   OT Treatment Day 4   OT Frequency 1-2x/wk   Mica Chawla OT

## 2019-12-16 NOTE — PLAN OF CARE
Problem: SELF HARM/SUICIDALITY  Goal: Will have no self-injury during hospital stay  Description  INTERVENTIONS:  - Q 15 MINUTES: Routine safety checks  - Q WAKING SHIFT & PRN: Assess risk to determine if routine checks are adequate to maintain patient safety  - Encourage patient to participate actively in care by formulating a plan to combat response to suicidal ideation, identify supports and resources  Outcome: Progressing     Problem: DEPRESSION  Goal: Will be euthymic at discharge  Description  INTERVENTIONS:  - Administer medication as ordered  - Provide emotional support via 1:1 interaction with staff  - Encourage involvement in milieu/groups/activities  - Monitor for social isolation  Outcome: Progressing     Problem: ANXIETY  Goal: Will report anxiety at manageable levels  Description  INTERVENTIONS:  - Administer medication as ordered  - Teach and encourage coping skills  - Provide emotional support  - Assess patient/family for anxiety and ability to cope  Outcome: Progressing  Goal: By discharge: Patient will verbalize 2 strategies to deal with anxiety  Description  Interventions:  - Identify any obvious source/trigger to anxiety  - Staff will assist patient in applying identified coping technique/skills  - Encourage attendance of scheduled groups and activities  Outcome: Progressing     Problem: Alteration in Thoughts and Perception  Goal: Treatment Goal: Gain control of psychotic behaviors/thinking, reduce/eliminate presenting symptoms and demonstrate improved reality functioning upon discharge  Outcome: Progressing  Goal: Verbalize thoughts and feelings  Description  Interventions:  - Promote a nonjudgmental and trusting relationship with the patient through active listening and therapeutic communication  - Assess patient's level of functioning, behavior and potential for risk  - Engage patient in 1 on 1 interactions  - Encourage patient to express fears, feelings, frustrations, and discuss symptoms - [place] patient to reality, help patient recognize reality-based thinking   - Administer medications as ordered and assess for potential side effects  - Provide the patient education related to the signs and symptoms of the illness and desired effects of prescribed medications  Outcome: Progressing  Goal: Refrain from acting on delusional thinking/internal stimuli  Description  Interventions:  - Monitor patient closely, per order   - Utilize least restrictive measures   - Set reasonable limits, give positive feedback for acceptable   - Administer medications as ordered and monitor of potential side effects  Outcome: Progressing  Goal: Agree to be compliant with medication regime, as prescribed and report medication side effects  Description  Interventions:  - Offer appropriate PRN medication and supervise ingestion; conduct AIMS, as needed   Outcome: Progressing  Goal: Recognize dysfunctional thoughts, communicate reality-based thoughts at the time of discharge  Description  Interventions:  - Provide medication and psycho-education to assist patient in compliance and developing insight into his/her illness   Outcome: Progressing  Goal: Complete daily ADLs, including personal hygiene independently, as able  Description  Interventions:  - Observe, teach, and assist patient with ADLS  - Monitor and promote a balance of rest/activity, with adequate nutrition and elimination   Outcome: Progressing     Problem: Risk for Self Injury/Neglect  Goal: Treatment Goal: Remain safe during length of stay, learn and adopt new coping skills, and be free of self-injurious ideation, impulses and acts at the time of discharge  Outcome: Progressing  Goal: Verbalize thoughts and feelings  Description  Interventions:  - Assess and re-assess patient's lethality and potential for self-injury  - Engage patient in 1:1 interactions, daily, for a minimum of 15 minutes  - Encourage patient to express feelings, fears, frustrations, hopes  - Establish rapport/trust with patient   Outcome: Progressing  Goal: Refrain from harming self  Description  Interventions:  - Monitor patient closely, per order  - Develop a trusting relationship  - Supervise medication ingestion, monitor effects and side effects   Outcome: Progressing     Problem: Depression  Goal: Treatment Goal: Demonstrate behavioral control of depressive symptoms, verbalize feelings of improved mood/affect, and adopt new coping skills prior to discharge  Outcome: Progressing  Goal: Refrain from harming self  Description  Interventions:  - Monitor patient closely, per order   - Supervise medication ingestion, monitor effects and side effects   Outcome: Progressing  Goal: Refrain from isolation  Description  Interventions:  - Develop a trusting relationship   - Encourage socialization   Outcome: Progressing  Goal: Refrain from self-neglect  Outcome: Progressing     Problem: Anxiety  Goal: Anxiety is at manageable level  Description  Interventions:  - Assess and monitor patient's anxiety level  - Monitor for signs and symptoms (heart palpitations, chest pain, shortness of breath, headaches, nausea, feeling jumpy, restlessness, irritable, apprehensive)  - Collaborate with interdisciplinary team and initiate plan and interventions as ordered    - [place] patient to unit/surroundings  - Explain treatment plan  - Encourage participation in care  - Encourage verbalization of concerns/fears  - Identify coping mechanisms  - Assist in developing anxiety-reducing skills  - Administer/offer alternative therapies  - Limit or eliminate stimulants  Outcome: Progressing

## 2019-12-16 NOTE — PROGRESS NOTES
12/16/19 1400   Activity/Group Checklist   Group Other (Comment)  (community supports)   Attendance Attended   Attendance Duration (min) 31-45   Interactions Interacted appropriately   Affect/Mood Blunted/flat   Goals Achieved Identified feelings; Identified relapse prevention strategies; Discussed coping strategies; Identified resources and support systems; Able to listen to others

## 2019-12-16 NOTE — PLAN OF CARE
Problem: OCCUPATIONAL THERAPY ADULT  Goal: Performs self-care activities at highest level of function for planned discharge setting  See evaluation for individualized goals  Description  Treatment Interventions: ADL retraining, Continued evaluation, Activityengagement(coping skills, life management skills)          See flowsheet documentation for full assessment, interventions and recommendations  Outcome: Progressing  Note:         Assessment: Shavon Brewer was attentive, engaged in OT activity  She explored the benefits of journal keeping  She was provided with a blank journal and handouts on different ways to journal  She was quiet, she did at times interject brief comments  She did share that she planned to be leaving in the next few days  She stated that she had signed a  72 hour notice, she felt good about this and stated that she did not want to be in the hospital over the holidays  She also stated that she was feeling better, she had just talked with her son  She did discuss the benefits of gratitude to promote a more positive focus, she stated that she was appreciative of her son  Progress has been consistent towards her goals  Continue to promote positive focus on the living unit

## 2019-12-16 NOTE — PROGRESS NOTES
48 y o  was seen today, on unit  According to the patient and nursing staff, the following is reported: she has been doing fairly well, mood and anxiety improving, also sleeping better with the medication changes, specifically to help sleep and mood  She is on a lot of medications will attempt to eliminate at least one of them  She is tolerating medications, complaint with medications, no agitation  Will now begin to focus on alcohol abstinence         Mental Status Evaluation:  Appearance:  Adequate hygiene and grooming   Behavior:  calm, cooperative and friendly   Mood:  anxious   Affect: constricted   Speech: Normal rate and Normal volume   Thought Process:  Goal directed and coherent   Thought Content:  Does not verbalize delusional material   Perceptual Disturbances: Denies hallucinations and does not appear to be responding to internal stimuli   Risk Potential: No suicidal or homicidal ideation   Orientation:   Oriented x 3         Current Facility-Administered Medications:  aluminum-magnesium hydroxide-simethicone 30 mL Oral Q4H PRN Eli Conley MD   benztropine 1 mg Intramuscular Q6H PRN Eli Conley MD   benztropine 1 mg Oral Q6H PRN Eli Conley MD   cloNIDine 0 1 mg Oral BID Jodee Mcduffie MD   docusate sodium 100 mg Oral BID Jodee Mcduffie MD   doxepin 50 mg Oral HS Delfina Price MD   folic acid 1 mg Oral Daily Jodee Mcduffie MD   gabapentin 400 mg Oral BID Delfina Price MD   gabapentin 600 mg Oral HS Delfina Price MD   haloperidol 5 mg Oral Q8H PRN Eli Conley MD   hydrochlorothiazide 25 mg Oral Daily Jodee Mcduffie MD   lamoTRIgine 50 mg Oral Daily Delfina Price MD   levothyroxine 50 mcg Oral Daily Jodee Mcduffie MD   lisinopril 20 mg Oral Daily Jodee Mcduffie MD   LORazepam 1 mg Oral Q4H PRN Eli Conley MD   lurasidone 80 mg Oral Daily With Nithin Munoz MD   magnesium hydroxide 30 mL Oral Daily PRN Eli Conley MD   melatonin 3 mg Oral HS Jodee Mcduffie MD metoprolol tartrate 50 mg Oral Q12H Alta Mcghee MD   nicotine 1 patch Transdermal Daily Farrukh Lees MD   nicotine polacrilex 2 mg Oral Q2H PRN David Lock, MD   OLANZapine 10 mg Intramuscular BID PRN David Lock, MD   pantoprazole 40 mg Oral Early Morning Farrukh Lees MD   risperiDONE 1 mg Oral Q8H PRN David Lock, MD   sucralfate 1 g Oral 4x Daily (AC & HS) Farrukh Lees MD   thiamine 100 mg Oral Daily Farrukh Lees MD   traZODone 200 mg Oral HS Van Vera MD   traZODone 25 mg Oral HS PRN David Lock, MD   venlafaxine 225 mg Oral Daily Van Vera MD      Patient Active Problem List    Diagnosis Date Noted    Bipolar I disorder, most recent episode depressed, severe without psychotic features ([facility] ) 05/25/2019     Priority: S    Intentional acetaminophen overdose ([facility] ) 12/11/2019    Alcohol abuse 12/11/2019    Suicidal ideation     Transaminitis 11/15/2019    Reported medication overdose self-harm 09/21/2019    [facility] 05/25/2019    Post-traumatic stress disorder, chronic 05/25/2019    Chest pain 33/94/8175    Uncomplicated alcohol dependence ([facility] ) 05/07/2019    Acquired hypothyroidism 05/17/2018    Essential hypertension 05/16/2018    Gastritis 05/16/2018    Heart palpitations 12/14/2017    Generalized anxiety disorder 12/14/2017    Hypercholesteremia 10/20/2015    Tobacco abuse 10/14/2015        Plan: Medications reviewed, benefits/risks discussed with patient, condition reviewed with treatment team  Routine monitoring will occur on unit, evaluation of effectiveness and side effects of medications  Will continue with current medications, much improved, will D/C melatonin

## 2019-12-16 NOTE — PROGRESS NOTES
12/16/19 0900   Team Meeting   Meeting Type Daily Rounds   Team Members Present   Team Members Present Physician;Nurse;;; Occupational Therapist   Physician Team Member Dr Wylene Sandifer, [address] [facility]    Nursing Team Member [address] [facility], [address] [place] Management Team Member Pritesh Wong 33 Work Team Member An BALBUENA    OT Team Member Geronimo Franco      Pt discussed at treatment team today, melatonin discontinued

## 2019-12-16 NOTE — NURSING NOTE
Patient was visible on the milieu tonight  She was seen in the dining room socializing with select female peers  She was brief, and flat in conversation  She was cooperative with taking her evening medications  She denied having any depression, anxiety, hallucinations, and suicidal/homicidal thoughts  She stated she was anxious this morning because she drank decaf coffee  Safety plan was reviewed with the patient and staff availability was reinforced

## 2019-12-16 NOTE — PROGRESS NOTES
Pt attended  open discussion group  Pt was anxious and blunt affect  Pt was able to self express  Pt noted her own difficulty with keeping appointments after d/c  She stated her depression is a barrier and getting out of bed to attend  Reviewed times of appointments and scheduling concerns  Pt noted she did not like her previous psychiatrist and therapist   She hopes to discuss with CM options with her insurance and available providers for services  Continue to provide therepuetic group support  12/16/19 1000   Activity/Group Checklist   Group Other (Comment)  ( open discussion)   Attendance Attended   Attendance Duration (min) 31-45   Interactions Interacted appropriately   Affect/Mood Blunted/flat   Goals Achieved Identified feelings; Discussed coping strategies; Able to listen to others; Able to engage in interactions; Able to reflect/comment on own behavior;Able to manage/cope with feelings

## 2019-12-17 PROCEDURE — 99232 SBSQ HOSP IP/OBS MODERATE 35: CPT | Performed by: PSYCHIATRY & NEUROLOGY

## 2019-12-17 RX ADMIN — SUCRALFATE 1 G: 1 TABLET ORAL at 11:25

## 2019-12-17 RX ADMIN — NICOTINE 1 PATCH: 14 PATCH, EXTENDED RELEASE TRANSDERMAL at 08:24

## 2019-12-17 RX ADMIN — FOLIC ACID 1 MG: 1 TABLET ORAL at 08:20

## 2019-12-17 RX ADMIN — LISINOPRIL 20 MG: 20 TABLET ORAL at 08:24

## 2019-12-17 RX ADMIN — CLONIDINE HYDROCHLORIDE 0.1 MG: 0.1 TABLET ORAL at 17:15

## 2019-12-17 RX ADMIN — METOPROLOL TARTRATE 50 MG: 50 TABLET, FILM COATED ORAL at 08:22

## 2019-12-17 RX ADMIN — LAMOTRIGINE 50 MG: 25 TABLET ORAL at 08:20

## 2019-12-17 RX ADMIN — HYDROCHLOROTHIAZIDE 25 MG: 25 TABLET ORAL at 08:21

## 2019-12-17 RX ADMIN — CLONIDINE HYDROCHLORIDE 0.1 MG: 0.1 TABLET ORAL at 08:21

## 2019-12-17 RX ADMIN — TRAZODONE HYDROCHLORIDE 200 MG: 100 TABLET ORAL at 21:13

## 2019-12-17 RX ADMIN — LURASIDONE HYDROCHLORIDE 80 MG: 80 TABLET, FILM COATED ORAL at 17:16

## 2019-12-17 RX ADMIN — GABAPENTIN 400 MG: 400 CAPSULE ORAL at 13:33

## 2019-12-17 RX ADMIN — SUCRALFATE 1 G: 1 TABLET ORAL at 08:21

## 2019-12-17 RX ADMIN — GABAPENTIN 600 MG: 300 CAPSULE ORAL at 21:14

## 2019-12-17 RX ADMIN — SUCRALFATE 1 G: 1 TABLET ORAL at 17:16

## 2019-12-17 RX ADMIN — SUCRALFATE 1 G: 1 TABLET ORAL at 21:14

## 2019-12-17 RX ADMIN — METOPROLOL TARTRATE 50 MG: 50 TABLET, FILM COATED ORAL at 21:14

## 2019-12-17 RX ADMIN — LEVOTHYROXINE SODIUM 50 MCG: 50 TABLET ORAL at 06:02

## 2019-12-17 RX ADMIN — THIAMINE HCL TAB 100 MG 100 MG: 100 TAB at 08:24

## 2019-12-17 RX ADMIN — VENLAFAXINE HYDROCHLORIDE 225 MG: 75 CAPSULE, EXTENDED RELEASE ORAL at 08:20

## 2019-12-17 RX ADMIN — GABAPENTIN 400 MG: 400 CAPSULE ORAL at 08:24

## 2019-12-17 RX ADMIN — NICOTINE POLACRILEX 2 MG: 2 GUM, CHEWING BUCCAL at 17:41

## 2019-12-17 RX ADMIN — PANTOPRAZOLE SODIUM 40 MG: 40 TABLET, DELAYED RELEASE ORAL at 06:02

## 2019-12-17 RX ADMIN — DOXEPIN HYDROCHLORIDE 50 MG: 25 CAPSULE ORAL at 21:13

## 2019-12-17 RX ADMIN — NICOTINE POLACRILEX 2 MG: 2 GUM, CHEWING BUCCAL at 12:20

## 2019-12-17 NOTE — CASE MANAGEMENT
Have a call out to [facility]  [phone] asking about referring this pt  And since her previous provider waived the 20% fee she is interested in seeing Viri Valderrama if they can  They will call back after further investigation

## 2019-12-17 NOTE — NURSING NOTE
Pt visible in milieu  Pt social among peers and staff  Pt denies all s/s including SI/HI  Pt calm, pleasant and cooperative with care  Pt compliant with meal and medication regime  No new concerns at this time  VSS

## 2019-12-17 NOTE — NURSING NOTE
Visible and interacting with select peers  Guarded on approach  No c/o distress and firmly denies si/hi, no psychosis  Medication and meal compliant  Will continue to monitor and maintain q 7 min checks

## 2019-12-17 NOTE — PROGRESS NOTES
12/17/19 0900   Team Meeting   Meeting Type Daily Rounds   Team Members Present   Team Members Present Physician;Nurse;;; Occupational Therapist   Physician Team Member Dr Prasad Mckeon Team Member [address] Management Team Member [address], [address] Work Team Member Anthony BALBUENA    OT Team Member Garrett Cosby      Pt discussed at treatment team today, no medication changes made   Planned discharge Thursday

## 2019-12-17 NOTE — NURSING NOTE
Pt stated she slept well all night  Pt denied depression and anxiety - no SI and HI  Patient stated she feels safe here  Patient was medication compliant

## 2019-12-17 NOTE — PROGRESS NOTES
48 y o  was seen today, on unit  According to the patient and nursing staff, the following is reported: she is continuing to improve emotionally  Sleep and appetite are better  She is compliant with medications, no agitation, tolerating meds, wants to be discharged, signed a 72 hour notice yesterday         Mental Status Evaluation:  Appearance:  Adequate hygiene and grooming   Behavior:  calm, cooperative and friendly   Mood:  anxious and dysphoric   Affect: blunted   Speech: Normal rate and Normal volume   Thought Process:  Goal directed and coherent   Thought Content:  Does not verbalize delusional material   Perceptual Disturbances: Denies hallucinations and does not appear to be responding to internal stimuli   Risk Potential: No suicidal or homicidal ideation   Orientation:   Oriented x 3         Current Facility-Administered Medications:  aluminum-magnesium hydroxide-simethicone 30 mL Oral Q4H PRN Queenie Trammell MD   benztropine 1 mg Intramuscular Q6H PRN Queenie Trammell MD   benztropine 1 mg Oral Q6H PRN Queenie Trammell MD   cloNIDine 0 1 mg Oral BID Dandre Quan MD   docusate sodium 100 mg Oral BID Dandre Quan MD   doxepin 50 mg Oral HS Nivia Dominguez MD   folic acid 1 mg Oral Daily Dandre Quan MD   gabapentin 400 mg Oral BID Nivia Dominguez MD   gabapentin 600 mg Oral HS Nivia Dominguez MD   haloperidol 5 mg Oral Q8H PRN Queenie Trammell MD   hydrochlorothiazide 25 mg Oral Daily Dandre Quan MD   lamoTRIgine 50 mg Oral Daily Nivia Dominguez MD   levothyroxine 50 mcg Oral Daily Dandre Quan MD   lisinopril 20 mg Oral Daily Dandre Quan MD   LORazepam 1 mg Oral Q4H PRN Queenie Trammell MD   lurasidone 80 mg Oral Daily With Florina Benitez MD   magnesium hydroxide 30 mL Oral Daily PRN Queenie Trammell MD   metoprolol tartrate 50 mg Oral Q12H Dandre Quan MD   nicotine 1 patch Transdermal Daily Dadnre Quan MD   nicotine polacrilex 2 mg Oral Q2H PRN Queenie Trammell MD   OLANZapine 10 mg Intramuscular BID PRN Margaret Fontenot MD   pantoprazole 40 mg Oral Early Morning Roberto Morgan MD   risperiDONE 1 mg Oral Q8H PRN Margaret Fontenot MD   sucralfate 1 g Oral 4x Daily (AC & HS) Roberto Morgan MD   thiamine 100 mg Oral Daily Roberto Morgan MD   traZODone 200 mg Oral HS Linda Granados MD   traZODone 25 mg Oral HS PRN Margaret Fontenot MD   venlafaxine 225 mg Oral Daily Linda Granados MD      Patient Active Problem List    Diagnosis Date Noted    Bipolar I disorder, most recent episode depressed, severe without psychotic features ([facility] ) 05/25/2019     Priority: S    Intentional acetaminophen overdose ([facility] ) 12/11/2019    Alcohol abuse 12/11/2019    Suicidal ideation     Transaminitis 11/15/2019    Reported medication overdose self-harm 09/21/2019    [facility]    Post-traumatic stress disorder, chronic 05/25/2019    Chest pain 40/81/2688    Uncomplicated alcohol dependence ([facility] ) 05/07/2019    Acquired hypothyroidism 05/17/2018    Essential hypertension 05/16/2018    Gastritis 05/16/2018    Heart palpitations 12/14/2017    Generalized anxiety disorder 12/14/2017    Hypercholesteremia 10/20/2015    Tobacco abuse 10/14/2015        Plan: Medications reviewed, benefits/risks discussed with patient, condition reviewed with treatment team  Routine monitoring will occur on unit, evaluation of effectiveness and side effects of medications  Will continue with current meds, doing fairely well  Discharge on Thursday if she continues to do well

## 2019-12-17 NOTE — PROGRESS NOTES
Pt attended 1/3 groups  Pt was able to self reflect  After group completed  relapse prevention plan  Crisis and warmline phone nu,mbfidelia provided  Pt stated that her mood has improved  Pt noted that she needs to reach out for help/supports more prior to coming to hospital   Continue to provide therepuetic group support  12/17/19 1400   Activity/Group Checklist   Group Other (Comment)  (positive coping skills: gratitude)   Attendance Attended   Attendance Duration (min) 31-45   Interactions Interacted appropriately   Affect/Mood Appropriate   Goals Achieved Identified feelings; Discussed coping strategies; Able to listen to others; Able to engage in interactions

## 2019-12-17 NOTE — PROGRESS NOTES
12/17/19 1100   Service   Service SLIM   Provider Name dr Diaz Mckenzie   Pending Pathology and Pertinent Tests Most recent lab data reviewed   Level of care Current level of care is appropriate   K level from 12/14 reported to dr Ally Cuello

## 2019-12-17 NOTE — PLAN OF CARE
Problem: SELF HARM/SUICIDALITY  Goal: Will have no self-injury during hospital stay  Description  INTERVENTIONS:  - Q 15 MINUTES: Routine safety checks  - Q WAKING SHIFT & PRN: Assess risk to determine if routine checks are adequate to maintain patient safety  - Encourage patient to participate actively in care by formulating a plan to combat response to suicidal ideation, identify supports and resources  Outcome: Progressing     Problem: DEPRESSION  Goal: Will be euthymic at discharge  Description  INTERVENTIONS:  - Administer medication as ordered  - Provide emotional support via 1:1 interaction with staff  - Encourage involvement in milieu/groups/activities  - Monitor for social isolation  Outcome: Progressing     Problem: ANXIETY  Goal: Will report anxiety at manageable levels  Description  INTERVENTIONS:  - Administer medication as ordered  - Teach and encourage coping skills  - Provide emotional support  - Assess patient/family for anxiety and ability to cope  Outcome: Progressing  Goal: By discharge: Patient will verbalize 2 strategies to deal with anxiety  Description  Interventions:  - Identify any obvious source/trigger to anxiety  - Staff will assist patient in applying identified coping technique/skills  - Encourage attendance of scheduled groups and activities  Outcome: Progressing     Problem: Alteration in Thoughts and Perception  Goal: Treatment Goal: Gain control of psychotic behaviors/thinking, reduce/eliminate presenting symptoms and demonstrate improved reality functioning upon discharge  Outcome: Progressing  Goal: Verbalize thoughts and feelings  Description  Interventions:  - Promote a nonjudgmental and trusting relationship with the patient through active listening and therapeutic communication  - Assess patient's level of functioning, behavior and potential for risk  - Engage patient in 1 on 1 interactions  - Encourage patient to express fears, feelings, frustrations, and discuss symptoms - [place] patient to reality, help patient recognize reality-based thinking   - Administer medications as ordered and assess for potential side effects  - Provide the patient education related to the signs and symptoms of the illness and desired effects of prescribed medications  Outcome: Progressing  Goal: Refrain from acting on delusional thinking/internal stimuli  Description  Interventions:  - Monitor patient closely, per order   - Utilize least restrictive measures   - Set reasonable limits, give positive feedback for acceptable   - Administer medications as ordered and monitor of potential side effects  Outcome: Progressing  Goal: Agree to be compliant with medication regime, as prescribed and report medication side effects  Description  Interventions:  - Offer appropriate PRN medication and supervise ingestion; conduct AIMS, as needed   Outcome: Progressing  Goal: Attend and participate in unit activities, including therapeutic, recreational, and educational groups  Description  Interventions:  -Encourage Visitation and family involvement in care  Outcome: Progressing  Goal: Recognize dysfunctional thoughts, communicate reality-based thoughts at the time of discharge  Description  Interventions:  - Provide medication and psycho-education to assist patient in compliance and developing insight into his/her illness   Outcome: Progressing  Goal: Complete daily ADLs, including personal hygiene independently, as able  Description  Interventions:  - Observe, teach, and assist patient with ADLS  - Monitor and promote a balance of rest/activity, with adequate nutrition and elimination   Outcome: Progressing     Problem: Ineffective Coping  Goal: Cooperates with admission process  Description  Interventions:   - Complete admission process  Outcome: Progressing  Goal: Identifies ineffective coping skills  Outcome: Progressing  Goal: Identifies healthy coping skills  Outcome: Progressing  Goal: Demonstrates healthy coping skills  Outcome: Progressing  Goal: Participates in unit activities  Description  Interventions:  - Provide therapeutic environment   - Provide required programming   - Redirect inappropriate behaviors   Outcome: Progressing  Goal: Patient/Family participate in treatment and DC plans  Description  Interventions:  - Provide therapeutic environment  Outcome: Progressing  Goal: Patient/Family verbalizes awareness of resources  Outcome: Progressing  Goal: Understands least restrictive measures  Description  Interventions:  - Utilize least restrictive behavior  Outcome: Progressing  Goal: Free from restraint events  Description  - Utilize least restrictive measures   - Provide behavioral interventions   - Redirect inappropriate behaviors   Outcome: Progressing     Problem: Risk for Self Injury/Neglect  Goal: Treatment Goal: Remain safe during length of stay, learn and adopt new coping skills, and be free of self-injurious ideation, impulses and acts at the time of discharge  Outcome: Progressing  Goal: Verbalize thoughts and feelings  Description  Interventions:  - Assess and re-assess patient's lethality and potential for self-injury  - Engage patient in 1:1 interactions, daily, for a minimum of 15 minutes  - Encourage patient to express feelings, fears, frustrations, hopes  - Establish rapport/trust with patient   Outcome: Progressing  Goal: Refrain from harming self  Description  Interventions:  - Monitor patient closely, per order  - Develop a trusting relationship  - Supervise medication ingestion, monitor effects and side effects   Outcome: Progressing     Problem: Depression  Goal: Treatment Goal: Demonstrate behavioral control of depressive symptoms, verbalize feelings of improved mood/affect, and adopt new coping skills prior to discharge  Outcome: Progressing  Goal: Refrain from harming self  Description  Interventions:  - Monitor patient closely, per order   - Supervise medication ingestion, monitor effects and side effects   Outcome: Progressing  Goal: Refrain from isolation  Description  Interventions:  - Develop a trusting relationship   - Encourage socialization   Outcome: Progressing  Goal: Refrain from self-neglect  Outcome: Progressing     Problem: Anxiety  Goal: Anxiety is at manageable level  Description  Interventions:  - Assess and monitor patient's anxiety level  - Monitor for signs and symptoms (heart palpitations, chest pain, shortness of breath, headaches, nausea, feeling jumpy, restlessness, irritable, apprehensive)  - Collaborate with interdisciplinary team and initiate plan and interventions as ordered    - [place] patient to unit/surroundings  - Explain treatment plan  - Encourage participation in care  - Encourage verbalization of concerns/fears  - Identify coping mechanisms  - Assist in developing anxiety-reducing skills  - Administer/offer alternative therapies  - Limit or eliminate stimulants  Outcome: Progressing     Problem: DISCHARGE PLANNING  Goal: Discharge to home or other facility with appropriate resources  Description  INTERVENTIONS:  - Identify barriers to discharge w/patient and caregiver  - Arrange for needed discharge resources and transportation as appropriate  - Identify discharge learning needs (meds, wound care, etc )  - Arrange for interpretive services to assist at discharge as needed  - Refer to Case Management Department for coordinating discharge planning if the patient needs post-hospital services based on physician/advanced practitioner order or complex needs related to functional status, cognitive ability, or social support system  Outcome: Progressing

## 2019-12-18 LAB
ANION GAP SERPL CALCULATED.3IONS-SCNC: 6 MMOL/L (ref 5–14)
BUN SERPL-MCNC: 11 MG/DL (ref 5–25)
CALCIUM SERPL-MCNC: 9.2 MG/DL (ref 8.4–10.2)
CHLORIDE SERPL-SCNC: 98 MMOL/L (ref 97–108)
CO2 SERPL-SCNC: 34 MMOL/L (ref 22–30)
CREAT SERPL-MCNC: 0.77 MG/DL (ref 0.6–1.2)
GFR SERPL CREATININE-BSD FRML MDRD: 89 ML/MIN/1.73SQ M
GLUCOSE P FAST SERPL-MCNC: 119 MG/DL (ref 70–99)
GLUCOSE SERPL-MCNC: 119 MG/DL (ref 70–99)
POTASSIUM SERPL-SCNC: 3.5 MMOL/L (ref 3.6–5)
SODIUM SERPL-SCNC: 138 MMOL/L (ref 137–147)

## 2019-12-18 PROCEDURE — 99232 SBSQ HOSP IP/OBS MODERATE 35: CPT | Performed by: PSYCHIATRY & NEUROLOGY

## 2019-12-18 PROCEDURE — 80048 BASIC METABOLIC PNL TOTAL CA: CPT | Performed by: FAMILY MEDICINE

## 2019-12-18 RX ORDER — DOXEPIN HYDROCHLORIDE 50 MG/1
50 CAPSULE ORAL
Qty: 30 CAPSULE | Refills: 0 | Status: ON HOLD | OUTPATIENT
Start: 2019-12-18 | End: 2020-02-17 | Stop reason: SDUPTHER

## 2019-12-18 RX ORDER — VENLAFAXINE HYDROCHLORIDE 75 MG/1
225 CAPSULE, EXTENDED RELEASE ORAL DAILY
Qty: 90 CAPSULE | Refills: 0 | Status: SHIPPED | OUTPATIENT
Start: 2019-12-19 | End: 2020-02-17 | Stop reason: HOSPADM

## 2019-12-18 RX ORDER — TRAZODONE HYDROCHLORIDE 100 MG/1
200 TABLET ORAL
Qty: 60 TABLET | Refills: 0 | Status: ON HOLD | OUTPATIENT
Start: 2019-12-18 | End: 2020-02-11

## 2019-12-18 RX ORDER — LANOLIN ALCOHOL/MO/W.PET/CERES
100 CREAM (GRAM) TOPICAL DAILY
Qty: 30 TABLET | Refills: 0 | Status: SHIPPED | OUTPATIENT
Start: 2019-12-18 | End: 2020-01-06

## 2019-12-18 RX ORDER — LAMOTRIGINE 25 MG/1
50 TABLET ORAL DAILY
Qty: 60 TABLET | Refills: 0 | Status: SHIPPED | OUTPATIENT
Start: 2019-12-19 | End: 2020-01-06

## 2019-12-18 RX ORDER — GABAPENTIN 400 MG/1
400 CAPSULE ORAL 2 TIMES DAILY
Qty: 60 CAPSULE | Refills: 0 | Status: SHIPPED | OUTPATIENT
Start: 2019-12-18 | End: 2020-01-06

## 2019-12-18 RX ORDER — POTASSIUM CHLORIDE 20 MEQ/1
20 TABLET, EXTENDED RELEASE ORAL ONCE
Status: COMPLETED | OUTPATIENT
Start: 2019-12-18 | End: 2019-12-18

## 2019-12-18 RX ORDER — GABAPENTIN 300 MG/1
600 CAPSULE ORAL
Qty: 30 CAPSULE | Refills: 0 | Status: ON HOLD | OUTPATIENT
Start: 2019-12-18 | End: 2020-02-17 | Stop reason: SDUPTHER

## 2019-12-18 RX ADMIN — SUCRALFATE 1 G: 1 TABLET ORAL at 17:19

## 2019-12-18 RX ADMIN — POTASSIUM CHLORIDE 20 MEQ: 20 TABLET, EXTENDED RELEASE ORAL at 08:09

## 2019-12-18 RX ADMIN — LISINOPRIL 20 MG: 20 TABLET ORAL at 08:09

## 2019-12-18 RX ADMIN — SUCRALFATE 1 G: 1 TABLET ORAL at 21:08

## 2019-12-18 RX ADMIN — LAMOTRIGINE 50 MG: 25 TABLET ORAL at 08:09

## 2019-12-18 RX ADMIN — SUCRALFATE 1 G: 1 TABLET ORAL at 11:50

## 2019-12-18 RX ADMIN — GABAPENTIN 400 MG: 400 CAPSULE ORAL at 08:09

## 2019-12-18 RX ADMIN — SUCRALFATE 1 G: 1 TABLET ORAL at 06:06

## 2019-12-18 RX ADMIN — VENLAFAXINE HYDROCHLORIDE 225 MG: 75 CAPSULE, EXTENDED RELEASE ORAL at 08:09

## 2019-12-18 RX ADMIN — TRAZODONE HYDROCHLORIDE 200 MG: 100 TABLET ORAL at 21:06

## 2019-12-18 RX ADMIN — METOPROLOL TARTRATE 50 MG: 50 TABLET, FILM COATED ORAL at 21:07

## 2019-12-18 RX ADMIN — METOPROLOL TARTRATE 50 MG: 50 TABLET, FILM COATED ORAL at 08:10

## 2019-12-18 RX ADMIN — GABAPENTIN 400 MG: 400 CAPSULE ORAL at 17:24

## 2019-12-18 RX ADMIN — PANTOPRAZOLE SODIUM 40 MG: 40 TABLET, DELAYED RELEASE ORAL at 05:39

## 2019-12-18 RX ADMIN — NICOTINE 1 PATCH: 14 PATCH, EXTENDED RELEASE TRANSDERMAL at 08:16

## 2019-12-18 RX ADMIN — GABAPENTIN 600 MG: 300 CAPSULE ORAL at 21:08

## 2019-12-18 RX ADMIN — LURASIDONE HYDROCHLORIDE 80 MG: 80 TABLET, FILM COATED ORAL at 17:19

## 2019-12-18 RX ADMIN — FOLIC ACID 1 MG: 1 TABLET ORAL at 08:09

## 2019-12-18 RX ADMIN — NICOTINE POLACRILEX 2 MG: 2 GUM, CHEWING BUCCAL at 10:00

## 2019-12-18 RX ADMIN — LEVOTHYROXINE SODIUM 50 MCG: 50 TABLET ORAL at 05:39

## 2019-12-18 RX ADMIN — CLONIDINE HYDROCHLORIDE 0.1 MG: 0.1 TABLET ORAL at 08:09

## 2019-12-18 RX ADMIN — CLONIDINE HYDROCHLORIDE 0.1 MG: 0.1 TABLET ORAL at 17:19

## 2019-12-18 RX ADMIN — DOXEPIN HYDROCHLORIDE 50 MG: 25 CAPSULE ORAL at 21:08

## 2019-12-18 RX ADMIN — THIAMINE HCL TAB 100 MG 100 MG: 100 TAB at 08:09

## 2019-12-18 NOTE — PROGRESS NOTES
Pt attended 3/3 groups  Pt cooperative and able to self reflect  Continue to provide therapeutic group support  12/18/19 1400   Activity/Group Checklist   Group Other (Comment)  (communication and relationships)   Attendance Attended   Attendance Duration (min) 31-45   Interactions Interacted appropriately   Affect/Mood Appropriate   Goals Achieved Identified feelings; Discussed coping strategies; Able to self-disclose; Able to recieve feedback

## 2019-12-18 NOTE — PROGRESS NOTES
48 y o  was seen today, on unit  According to the patient and nursing staff, the following is reported: she is doing fairly well, mood and anxiety have improved significantly  She is sleeping well, appetite good, compliant with meds, cooperative on unit  No agitation or other behavior problems  She denies having any SI/HI, agrees to follow up as an outpatient and stop drinking completely         Mental Status Evaluation:  Appearance:  Adequate hygiene and grooming   Behavior:  calm, cooperative and friendly   Mood:  dysphoric   Affect: constricted   Speech: Normal rate and Normal volume   Thought Process:  Goal directed and coherent   Thought Content:  Does not verbalize delusional material   Perceptual Disturbances: Denies hallucinations and does not appear to be responding to internal stimuli   Risk Potential: No suicidal or homicidal ideation   Orientation:   Oriented x 3         Current Facility-Administered Medications:  aluminum-magnesium hydroxide-simethicone 30 mL Oral Q4H PRN Saurabh Woods MD   benztropine 1 mg Intramuscular Q6H PRN Saurabh Woods MD   benztropine 1 mg Oral Q6H PRN Saurabh Woods MD   cloNIDine 0 1 mg Oral BID Vasquez Heck MD   docusate sodium 100 mg Oral BID Vasquez Heck MD   doxepin 50 mg Oral HS Sukumar Evans MD   folic acid 1 mg Oral Daily Vasquez Heck MD   gabapentin 400 mg Oral BID Sukumar Evans MD   gabapentin 600 mg Oral HS Sukumar Evans MD   haloperidol 5 mg Oral Q8H PRN Saurabh Woods MD   lamoTRIgine 50 mg Oral Daily Sukumar Evans MD   levothyroxine 50 mcg Oral Daily Vasquez Heck MD   lisinopril 20 mg Oral Daily Vasquez Heck MD   LORazepam 1 mg Oral Q4H PRN Saurabh Woods MD   lurasidone 80 mg Oral Daily With Jacques Natarajan MD   magnesium hydroxide 30 mL Oral Daily PRN Saurabh Woods MD   metoprolol tartrate 50 mg Oral Q12H Vasquez Heck MD   nicotine 1 patch Transdermal Daily Vasquez Heck MD   nicotine polacrilex 2 mg Oral Q2H PRN Eugenie Ocampo MD Rod   OLANZapine 10 mg Intramuscular BID PRN Shruthi Torres MD   pantoprazole 40 mg Oral Early Morning Marry Roberts MD   risperiDONE 1 mg Oral Q8H PRN Shruthi Torres MD   sucralfate 1 g Oral 4x Daily (AC & HS) Marry Roberts MD   thiamine 100 mg Oral Daily Marry Roberts MD   traZODone 200 mg Oral HS Valencia Llanos MD   traZODone 25 mg Oral HS PRN Shruthi Torres MD   venlafaxine 225 mg Oral Daily Valencia Llanos MD      Patient Active Problem List    Diagnosis Date Noted    Bipolar I disorder, most recent episode depressed, severe without psychotic features ([place] [address] ) 05/25/2019     Priority: S    Intentional acetaminophen overdose ([facility] ) 12/11/2019    Alcohol abuse 12/11/2019    Suicidal ideation     Transaminitis 11/15/2019    Reported medication overdose self-harm 09/21/2019    h/o Seizure ([place] [address] ) 05/25/2019    Post-traumatic stress disorder, chronic 05/25/2019    Chest pain 62/74/5802    Uncomplicated alcohol dependence ([place] [address] ) 05/07/2019    Acquired hypothyroidism 05/17/2018    Essential hypertension 05/16/2018    Gastritis 05/16/2018    Heart palpitations 12/14/2017    Generalized anxiety disorder 12/14/2017    Hypercholesteremia 10/20/2015    Tobacco abuse 10/14/2015        Plan: Medications reviewed, benefits/risks discussed with patient, condition reviewed with treatment team  Routine monitoring will occur on unit, evaluation of effectiveness and side effects of medications  Will continue with current meds, discharge tomorrow with outpatient follow up

## 2019-12-18 NOTE — NURSING NOTE
Patient was visible on the milieu tonight  She was seen in the dining room socializing with select female peers  She was brief, and flat in conversation  She was cooperative with taking her evening medications  She denied having any depression, anxiety, hallucinations, and suicidal/homicidal thoughts  Safety plan was reviewed with the patient and staff availability was reinforced

## 2019-12-18 NOTE — NURSING NOTE
Patient with > 6 hours of sleep  Patient was free from falls   Q7 Minute Safety Checks were maintained

## 2019-12-18 NOTE — CASE MANAGEMENT
Met with pt to discuss her upcoming discharge on 12/19  She feels she is better and denies SI  She plans to call her father to pick her up around 11:00AM  She is pleased to have an appointment with [facility] since Jadiel Sherwood is unable to adjust there fee

## 2019-12-18 NOTE — DISCHARGE INSTRUCTIONS

## 2019-12-18 NOTE — NURSING NOTE
Awake and visible on the unit  Pt reported good sleep and improvement in her depression  Pt denies any anxiety,denies any SI or HI  Med compliant  Pt noted to be attending groups and social with peers  Will continue to monitor closely

## 2019-12-18 NOTE — PROGRESS NOTES
Pt was unsure of how to set a personal goal when in the hospital  She viewed her admission as a place that others could care for her  Pt was eventually agreeable to a goal to engage in active leisure with peers and learn their names,in an effort to pass her time more effectively  12/18/19 1100   Activity/Group Checklist   Group Exercise  (seated musical exercise/discussion on self motivation )   Attendance Attended   Attendance Duration (min) 31-45   Interactions Interacted appropriately   Affect/Mood Appropriate;Normal range   Goals Achieved Able to engage in interactions; Discussed coping strategies

## 2019-12-18 NOTE — NURSING NOTE
Pt visible in milieu  Pt noted doing crossword puzzles and watching tv with peers  Socializing appropriately  Pt has no new concerns at this time  Pt reports looking forward to being discharged tomorrow  Pt reports depression has improved and she is in a much better place now and ready to go  Pt denies SI/HI  Pt calm, pleasant and cooperative with care  PT compliant with meal and medication regime  VSS

## 2019-12-18 NOTE — PROGRESS NOTES
Persistent hypokalemia discontinue hydrochlorothiazide although improved from previous now is 3 5 will give an additional dose

## 2019-12-18 NOTE — PROGRESS NOTES
12/18/19 0900   Team Meeting   Meeting Type Daily Rounds   Team Members Present   Team Members Present Physician;Nurse;;   Physician Team Member Dr Camille Lees Team Member [id number] Management Team Member Irwin Holstein     Social Work Team Member Cammie BALBUENA      Pt discussed at treatment team today, no medication changes made, planned discharge tomorrow

## 2019-12-18 NOTE — PLAN OF CARE
Problem: SELF HARM/SUICIDALITY  Goal: Will have no self-injury during hospital stay  Description  INTERVENTIONS:  - Q 15 MINUTES: Routine safety checks  - Q WAKING SHIFT & PRN: Assess risk to determine if routine checks are adequate to maintain patient safety  - Encourage patient to participate actively in care by formulating a plan to combat response to suicidal ideation, identify supports and resources  Outcome: Progressing     Problem: DEPRESSION  Goal: Will be euthymic at discharge  Description  INTERVENTIONS:  - Administer medication as ordered  - Provide emotional support via 1:1 interaction with staff  - Encourage involvement in milieu/groups/activities  - Monitor for social isolation  Outcome: Progressing     Problem: ANXIETY  Goal: Will report anxiety at manageable levels  Description  INTERVENTIONS:  - Administer medication as ordered  - Teach and encourage coping skills  - Provide emotional support  - Assess patient/family for anxiety and ability to cope  Outcome: Progressing  Goal: By discharge: Patient will verbalize 2 strategies to deal with anxiety  Description  Interventions:  - Identify any obvious source/trigger to anxiety  - Staff will assist patient in applying identified coping technique/skills  - Encourage attendance of scheduled groups and activities  Outcome: Progressing     Problem: Alteration in Thoughts and Perception  Goal: Treatment Goal: Gain control of psychotic behaviors/thinking, reduce/eliminate presenting symptoms and demonstrate improved reality functioning upon discharge  Outcome: Progressing  Goal: Verbalize thoughts and feelings  Description  Interventions:  - Promote a nonjudgmental and trusting relationship with the patient through active listening and therapeutic communication  - Assess patient's level of functioning, behavior and potential for risk  - Engage patient in 1 on 1 interactions  - Encourage patient to express fears, feelings, frustrations, and discuss symptoms - [place] patient to reality, help patient recognize reality-based thinking   - Administer medications as ordered and assess for potential side effects  - Provide the patient education related to the signs and symptoms of the illness and desired effects of prescribed medications  Outcome: Progressing  Goal: Refrain from acting on delusional thinking/internal stimuli  Description  Interventions:  - Monitor patient closely, per order   - Utilize least restrictive measures   - Set reasonable limits, give positive feedback for acceptable   - Administer medications as ordered and monitor of potential side effects  Outcome: Progressing  Goal: Agree to be compliant with medication regime, as prescribed and report medication side effects  Description  Interventions:  - Offer appropriate PRN medication and supervise ingestion; conduct AIMS, as needed   Outcome: Progressing  Goal: Recognize dysfunctional thoughts, communicate reality-based thoughts at the time of discharge  Description  Interventions:  - Provide medication and psycho-education to assist patient in compliance and developing insight into his/her illness   Outcome: Progressing  Goal: Complete daily ADLs, including personal hygiene independently, as able  Description  Interventions:  - Observe, teach, and assist patient with ADLS  - Monitor and promote a balance of rest/activity, with adequate nutrition and elimination   Outcome: Progressing     Problem: Risk for Self Injury/Neglect  Goal: Treatment Goal: Remain safe during length of stay, learn and adopt new coping skills, and be free of self-injurious ideation, impulses and acts at the time of discharge  Outcome: Progressing  Goal: Verbalize thoughts and feelings  Description  Interventions:  - Assess and re-assess patient's lethality and potential for self-injury  - Engage patient in 1:1 interactions, daily, for a minimum of 15 minutes  - Encourage patient to express feelings, fears, frustrations, hopes  - Establish rapport/trust with patient   Outcome: Progressing  Goal: Refrain from harming self  Description  Interventions:  - Monitor patient closely, per order  - Develop a trusting relationship  - Supervise medication ingestion, monitor effects and side effects   Outcome: Progressing     Problem: Depression  Goal: Treatment Goal: Demonstrate behavioral control of depressive symptoms, verbalize feelings of improved mood/affect, and adopt new coping skills prior to discharge  Outcome: Progressing  Goal: Refrain from harming self  Description  Interventions:  - Monitor patient closely, per order   - Supervise medication ingestion, monitor effects and side effects   Outcome: Progressing  Goal: Refrain from isolation  Description  Interventions:  - Develop a trusting relationship   - Encourage socialization   Outcome: Progressing  Goal: Refrain from self-neglect  Outcome: Progressing     Problem: Anxiety  Goal: Anxiety is at manageable level  Description  Interventions:  - Assess and monitor patient's anxiety level  - Monitor for signs and symptoms (heart palpitations, chest pain, shortness of breath, headaches, nausea, feeling jumpy, restlessness, irritable, apprehensive)  - Collaborate with interdisciplinary team and initiate plan and interventions as ordered    - [place] patient to unit/surroundings  - Explain treatment plan  - Encourage participation in care  - Encourage verbalization of concerns/fears  - Identify coping mechanisms  - Assist in developing anxiety-reducing skills  - Administer/offer alternative therapies  - Limit or eliminate stimulants  Outcome: Progressing

## 2019-12-19 VITALS
HEART RATE: 74 BPM | SYSTOLIC BLOOD PRESSURE: 188 MMHG | WEIGHT: 195.2 LBS | RESPIRATION RATE: 16 BRPM | BODY MASS INDEX: 34.59 KG/M2 | HEIGHT: 63 IN | OXYGEN SATURATION: 96 % | DIASTOLIC BLOOD PRESSURE: 83 MMHG | TEMPERATURE: 98.7 F

## 2019-12-19 PROCEDURE — 99238 HOSP IP/OBS DSCHRG MGMT 30/<: CPT | Performed by: PSYCHIATRY & NEUROLOGY

## 2019-12-19 RX ADMIN — LEVOTHYROXINE SODIUM 50 MCG: 50 TABLET ORAL at 05:31

## 2019-12-19 RX ADMIN — NICOTINE POLACRILEX 2 MG: 2 GUM, CHEWING BUCCAL at 09:51

## 2019-12-19 RX ADMIN — NICOTINE 1 PATCH: 14 PATCH, EXTENDED RELEASE TRANSDERMAL at 08:56

## 2019-12-19 RX ADMIN — VENLAFAXINE HYDROCHLORIDE 225 MG: 75 CAPSULE, EXTENDED RELEASE ORAL at 08:49

## 2019-12-19 RX ADMIN — THIAMINE HCL TAB 100 MG 100 MG: 100 TAB at 08:49

## 2019-12-19 RX ADMIN — LISINOPRIL 20 MG: 20 TABLET ORAL at 08:48

## 2019-12-19 RX ADMIN — PANTOPRAZOLE SODIUM 40 MG: 40 TABLET, DELAYED RELEASE ORAL at 05:31

## 2019-12-19 RX ADMIN — LAMOTRIGINE 50 MG: 25 TABLET ORAL at 08:46

## 2019-12-19 RX ADMIN — SUCRALFATE 1 G: 1 TABLET ORAL at 06:14

## 2019-12-19 RX ADMIN — DOCUSATE SODIUM 100 MG: 100 CAPSULE, LIQUID FILLED ORAL at 08:46

## 2019-12-19 RX ADMIN — METOPROLOL TARTRATE 50 MG: 50 TABLET, FILM COATED ORAL at 08:48

## 2019-12-19 RX ADMIN — CLONIDINE HYDROCHLORIDE 0.1 MG: 0.1 TABLET ORAL at 08:45

## 2019-12-19 RX ADMIN — SUCRALFATE 1 G: 1 TABLET ORAL at 11:05

## 2019-12-19 RX ADMIN — FOLIC ACID 1 MG: 1 TABLET ORAL at 08:46

## 2019-12-19 RX ADMIN — GABAPENTIN 400 MG: 400 CAPSULE ORAL at 08:46

## 2019-12-19 NOTE — NURSING NOTE
Requested and received nicorette gum x 1 for cravings at 0951 this am  Right hand remains ecchymotic and edematous  Pt is able to bend all fingers as well as hand and has full ROM  No c/o distress  Pt discharged from the unit today  Affect is brighter though she remains guarded  Denies si/hi, no psychosis  In agreement with discharge  Reviewed prescriptions, discharge instructions and also aftercare appointments  No questions verbalized  All belongings returned  To be transported home by her father who is picking her up outside of the hospital  Accompanied to the lobby with staff member

## 2019-12-19 NOTE — NURSING NOTE
Patient slept all night without any signs of distress noted and she is presently in bed sleeping  Compliant with her AM medication  Safety checks ongoing

## 2019-12-19 NOTE — DISCHARGE SUMMARY
Discharge Summary - [address] 48 y o  female MRN: [id number]  Unit/Bed#: [facility] 618-66 Encounter: [id number]     Admission Date: 12/12/2019         Discharge Date: 12/19/2019    Attending Psychiatrist: Milana Pichardo MD    Reason for Admission/HPI:   Patient is a  50 yo female with a long h/o Bipolar D/O, anxiety and alcohol use disorder, drank 4 beers and took a handful of acetaminophen PM,  # 10, ended up in ICU, 1 day before admission to psych  Reported feeling depressed, worse in the past few weeks, hopeless, thoughts of wishing she were dead, took overdose to go to sleep permanently  Reports poor sleep, poor appetite, low energy and interest  Has a h/o manic mood swings, most recently several weeks ago  Also has a lot of anxiety, feeling tense, edgy restless, panicky, poor sleep  Alcohol use is less than in the past, still drinking 4 beers per day on couple of days per week  Denies psychotic symptoms  Present meds: WEffexor  mg daily, Melatonin 3 mg hs, trazodone 150 mg hs, Latuda 80 mg q 6 pm, Gabapentin 400 mg tid, Clonidine 0 1 mg bid      Meds/Allergies     all current active meds have been reviewed and current meds:   Current Facility-Administered Medications   Medication Dose Route Frequency    aluminum-magnesium hydroxide-simethicone (MYLANTA) 200-200-20 mg/5 mL oral suspension 30 mL  30 mL Oral Q4H PRN    benztropine (COGENTIN) injection 1 mg  1 mg Intramuscular Q6H PRN    benztropine (COGENTIN) tablet 1 mg  1 mg Oral Q6H PRN    cloNIDine (CATAPRES) tablet 0 1 mg  0 1 mg Oral BID    docusate sodium (COLACE) capsule 100 mg  100 mg Oral BID    doxepin (SINEquan) capsule 50 mg  50 mg Oral HS    folic acid (FOLVITE) tablet 1 mg  1 mg Oral Daily    gabapentin (NEURONTIN) capsule 400 mg  400 mg Oral BID    gabapentin (NEURONTIN) capsule 600 mg  600 mg Oral HS    haloperidol (HALDOL) tablet 5 mg  5 mg Oral Q8H PRN    lamoTRIgine (LaMICtal) tablet 50 mg  50 mg Oral Daily  levothyroxine tablet 50 mcg  50 mcg Oral Daily    lisinopril (ZESTRIL) tablet 20 mg  20 mg Oral Daily    LORazepam (ATIVAN) tablet 1 mg  1 mg Oral Q4H PRN    lurasidone (LATUDA) tablet 80 mg  80 mg Oral Daily With Dinner    magnesium hydroxide (MILK OF MAGNESIA) 400 mg/5 mL oral suspension 30 mL  30 mL Oral Daily PRN    metoprolol tartrate (LOPRESSOR) tablet 50 mg  50 mg Oral Q12H    nicotine (NICODERM CQ) 14 mg/24hr TD 24 hr patch 1 patch  1 patch Transdermal Daily    nicotine polacrilex (NICORETTE) gum 2 mg  2 mg Oral Q2H PRN    OLANZapine (ZyPREXA) IM injection 10 mg  10 mg Intramuscular BID PRN    pantoprazole (PROTONIX) EC tablet 40 mg  40 mg Oral Early Morning    risperiDONE (RisperDAL M-TABS) dispersible tablet 1 mg  1 mg Oral Q8H PRN    sucralfate (CARAFATE) tablet 1 g  1 g Oral 4x Daily (AC & HS)    thiamine (VITAMIN B1) tablet 100 mg  100 mg Oral Daily    traZODone (DESYREL) tablet 200 mg  200 mg Oral HS    traZODone (DESYREL) tablet 25 mg  25 mg Oral HS PRN    venlafaxine (EFFEXOR-XR) 24 hr capsule 225 mg  225 mg Oral Daily       Allergies   Allergen Reactions    Latex Rash       Objective     Vital signs in last 24 hours:  Temp:  [97 5 °F (36 4 °C)-98 7 °F (37 1 °C)] 98 7 °F (37 1 °C)  HR:  [59-76] 74  Resp:  [16-18] 16  BP: (148-188)/(78-84) 188/83      Intake/Output Summary (Last 24 hours) at 12/19/2019 0919  Last data filed at 12/19/2019 0819  Gross per 24 hour   Intake 1000 ml   Output    Net 1000 ml       Hospital Course: The patient was admitted to the inpatient psychiatric unit and started on every 15 minutes precautions  A treatment plan was formed with focus on pharmacotherapy and milieu therapy, group therapy and individual psychotherapy when indicated  Psychiatric medications were titrated over the hospital stay and milieu therapy was utilized   To addressdepressive symptoms and Suicidal tendencies the patient was started on antidepressant Effexor XR, mood stabilizer Neurontin, antipsychotic medication Latuda and hypnotic medication Trazodone and Doxepin  Medication doses were titrated during the hospital course  She was also on Lamictal 25 mg at hs, which may have been causing insomnia  This was increased to 50 mg and given in the morning, which helped with mood and she slept better, with the change from bedtime to morning and also with the other changes, increaser in Trazodone, Neurontin at night, addition of Doxeipin at night  Effexor increase also helped her mood as well  Patient's symptoms improved gradually over the hospital course  At the end of treatment the patient was doing well  Mood was stable at the time of discharge  The patient denied suicidal ideation, intent or plan at the time of discharge and denied homicidal ideation, intent or plan at the time of discharge  There was no overt psychosis at the time of discharge  Sleep and appetite were improved  The patient was tolerating medications and was not reporting any significant side effects at the time of discharge  Since the patient was doing well at the end of the hospitalization, treatment team felt that the patient could be safely discharged to outpatient care  The outpatient follow up with a psychiatrist and a therapist was arranged by the unit  upon discharge      Mental Status at Time of Discharge:   Appearance:  Adequate hygiene and grooming   Behavior:  calm, cooperative and friendly   Speech:   Language: Normal rate and Normal volume  Able to name objects and Able to repeat phrases   Mood:  euthymic   Affect:   Associations: appropriate  Tightly connected   Thought Process:  Goal directed and coherent   Thought Content:  Does not verbalize delusional material   Perceptual Disturbances: Denies hallucinations and does not appear to be responding to internal stimuli     Risk Potential: No suicidal or homicidal ideation   Orientation   Language Oriented x 3  anomia No Memory  Fund of knowledge Short term intact and Long term intact  aware of current events, Aware of past history and vocabulary Average   Attention/Concentration attention span and concentration were age appropriate   Insight:  Good insight   Judgment: Good judgment   Gait/Station: normal gait/station   Motor Activity: No abnormal movement noted       Admission Diagnosis:  Principal Problem:    Bipolar I disorder, most recent episode depressed, severe without psychotic features ([facility] [address] )  Active Problems:    Essential hypertension    Acquired hypothyroidism    Hypercholesteremia    Tobacco abuse    Generalized anxiety disorder    Transaminitis    Intentional acetaminophen overdose ([facility] [address] )    Alcohol abuse      Discharge Diagnosis:     Principal Problem:    Bipolar I disorder, most recent episode depressed, severe without psychotic features ([facility] [address] )  Active Problems:    Essential hypertension    Acquired hypothyroidism    Hypercholesteremia    Tobacco abuse    Generalized anxiety disorder    Transaminitis    Intentional acetaminophen overdose ([facility] [address] )    Alcohol abuse  Resolved Problems:    * No resolved hospital problems   *      Lab results:    Admission on 12/12/2019   Component Date Value    Sodium 12/14/2019 137     Potassium 12/14/2019 3 1*    Chloride 12/14/2019 99     CO2 12/14/2019 31*    ANION GAP 12/14/2019 7     BUN 12/14/2019 9     Creatinine 12/14/2019 0 59*    Glucose 12/14/2019 113*    Calcium 12/14/2019 9 0     AST 12/14/2019 46*    ALT 12/14/2019 84*    Alkaline Phosphatase 12/14/2019 70     Total Protein 12/14/2019 6 2     Albumin 12/14/2019 3 4     Total Bilirubin 12/14/2019 1 80*    eGFR 12/14/2019 106     GGT 12/14/2019 254*    Sodium 12/18/2019 138     Potassium 12/18/2019 3 5*    Chloride 12/18/2019 98     CO2 12/18/2019 34*    ANION GAP 12/18/2019 6     BUN 12/18/2019 11     Creatinine 12/18/2019 0 77     Glucose 12/18/2019 119*    Glucose, Fasting 12/18/2019 119*    Calcium 12/18/2019 9 2     eGFR 12/18/2019 89        Discharge Medications:    See after visit summary for reconciled discharge medications provided to patient and family  Discharge instructions/Information to patient and family:     See after visit summary for information provided to patient and family  Provisions for Follow-Up Care:    See after visit summary for information related to follow-up care and any pertinent home health orders  Discharge Statement     I spent 30 minutes discharging the patient  This time was spent on the day of discharge  I had direct contact with the patient on the day of discharge  Additional documentation is required if more than 30 minutes were spent on discharge:    I reviewed with Shavon Brewer importance of compliance with medications and outpatient treatment after discharge  I discussed the medication regimen and possible side effects of the medications with Shavon Brewer prior to discharge  At the time of discharge she was tolerating psychiatric medications  I discussed outpatient follow up with Shavon Brewer  I reviewed with Shavon Brewer crisis plan and safety plan upon discharge  I discussed with Shavon Brewer recommendation to follow up with outpatient drug and alcohol counseling and AA meetings  Shavon Brewer agreed to abstain from drug and alcohol use after discharge  Shavon Brewer was competent to understand risks and benefits of withholding information and risks and benefits of her actions

## 2019-12-19 NOTE — PLAN OF CARE
Problem: SELF HARM/SUICIDALITY  Goal: Will have no self-injury during hospital stay  Description  INTERVENTIONS:  - Q 15 MINUTES: Routine safety checks  - Q WAKING SHIFT & PRN: Assess risk to determine if routine checks are adequate to maintain patient safety  - Encourage patient to participate actively in care by formulating a plan to combat response to suicidal ideation, identify supports and resources  Outcome: Completed     Problem: DEPRESSION  Goal: Will be euthymic at discharge  Description  INTERVENTIONS:  - Administer medication as ordered  - Provide emotional support via 1:1 interaction with staff  - Encourage involvement in milieu/groups/activities  - Monitor for social isolation  Outcome: Completed     Problem: ANXIETY  Goal: Will report anxiety at manageable levels  Description  INTERVENTIONS:  - Administer medication as ordered  - Teach and encourage coping skills  - Provide emotional support  - Assess patient/family for anxiety and ability to cope  Outcome: Completed  Goal: By discharge: Patient will verbalize 2 strategies to deal with anxiety  Description  Interventions:  - Identify any obvious source/trigger to anxiety  - Staff will assist patient in applying identified coping technique/skills  - Encourage attendance of scheduled groups and activities  Outcome: Completed     Problem: Alteration in Thoughts and Perception  Goal: Treatment Goal: Gain control of psychotic behaviors/thinking, reduce/eliminate presenting symptoms and demonstrate improved reality functioning upon discharge  Outcome: Completed  Goal: Verbalize thoughts and feelings  Description  Interventions:  - Promote a nonjudgmental and trusting relationship with the patient through active listening and therapeutic communication  - Assess patient's level of functioning, behavior and potential for risk  - Engage patient in 1 on 1 interactions  - Encourage patient to express fears, feelings, frustrations, and discuss symptoms    - Weld patient to reality, help patient recognize reality-based thinking   - Administer medications as ordered and assess for potential side effects  - Provide the patient education related to the signs and symptoms of the illness and desired effects of prescribed medications  Outcome: Completed  Goal: Refrain from acting on delusional thinking/internal stimuli  Description  Interventions:  - Monitor patient closely, per order   - Utilize least restrictive measures   - Set reasonable limits, give positive feedback for acceptable   - Administer medications as ordered and monitor of potential side effects  Outcome: Completed  Goal: Agree to be compliant with medication regime, as prescribed and report medication side effects  Description  Interventions:  - Offer appropriate PRN medication and supervise ingestion; conduct AIMS, as needed   Outcome: Completed  Goal: Attend and participate in unit activities, including therapeutic, recreational, and educational groups  Description  Interventions:  -Encourage Visitation and family involvement in care  Outcome: Completed  Goal: Recognize dysfunctional thoughts, communicate reality-based thoughts at the time of discharge  Description  Interventions:  - Provide medication and psycho-education to assist patient in compliance and developing insight into his/her illness   Outcome: Completed  Goal: Complete daily ADLs, including personal hygiene independently, as able  Description  Interventions:  - Observe, teach, and assist patient with ADLS  - Monitor and promote a balance of rest/activity, with adequate nutrition and elimination   Outcome: Completed     Problem: Ineffective Coping  Goal: Cooperates with admission process  Description  Interventions:   - Complete admission process  Outcome: Completed  Goal: Identifies ineffective coping skills  Outcome: Completed  Goal: Identifies healthy coping skills  Outcome: Completed  Goal: Demonstrates healthy coping skills  Outcome: Completed  Goal: Participates in unit activities  Description  Interventions:  - Provide therapeutic environment   - Provide required programming   - Redirect inappropriate behaviors   Outcome: Completed  Goal: Patient/Family participate in treatment and DC plans  Description  Interventions:  - Provide therapeutic environment  Outcome: Completed  Goal: Patient/Family verbalizes awareness of resources  Outcome: Completed  Goal: Understands least restrictive measures  Description  Interventions:  - Utilize least restrictive behavior  Outcome: Completed  Goal: Free from restraint events  Description  - Utilize least restrictive measures   - Provide behavioral interventions   - Redirect inappropriate behaviors   Outcome: Completed     Problem: Risk for Self Injury/Neglect  Goal: Treatment Goal: Remain safe during length of stay, learn and adopt new coping skills, and be free of self-injurious ideation, impulses and acts at the time of discharge  Outcome: Completed  Goal: Verbalize thoughts and feelings  Description  Interventions:  - Assess and re-assess patient's lethality and potential for self-injury  - Engage patient in 1:1 interactions, daily, for a minimum of 15 minutes  - Encourage patient to express feelings, fears, frustrations, hopes  - Establish rapport/trust with patient   Outcome: Completed  Goal: Refrain from harming self  Description  Interventions:  - Monitor patient closely, per order  - Develop a trusting relationship  - Supervise medication ingestion, monitor effects and side effects   Outcome: Completed     Problem: Depression  Goal: Treatment Goal: Demonstrate behavioral control of depressive symptoms, verbalize feelings of improved mood/affect, and adopt new coping skills prior to discharge  Outcome: Completed  Goal: Refrain from harming self  Description  Interventions:  - Monitor patient closely, per order   - Supervise medication ingestion, monitor effects and side effects   Outcome: Completed  Goal: Refrain from isolation  Description  Interventions:  - Develop a trusting relationship   - Encourage socialization   Outcome: Completed  Goal: Refrain from self-neglect  Outcome: Completed     Problem: Anxiety  Goal: Anxiety is at manageable level  Description  Interventions:  - Assess and monitor patient's anxiety level  - Monitor for signs and symptoms (heart palpitations, chest pain, shortness of breath, headaches, nausea, feeling jumpy, restlessness, irritable, apprehensive)  - Collaborate with interdisciplinary team and initiate plan and interventions as ordered    - [place] patient to unit/surroundings  - Explain treatment plan  - Encourage participation in care  - Encourage verbalization of concerns/fears  - Identify coping mechanisms  - Assist in developing anxiety-reducing skills  - Administer/offer alternative therapies  - Limit or eliminate stimulants  Outcome: Completed     Problem: DISCHARGE PLANNING  Goal: Discharge to home or other facility with appropriate resources  Description  INTERVENTIONS:  - Identify barriers to discharge w/patient and caregiver  - Arrange for needed discharge resources and transportation as appropriate  - Identify discharge learning needs (meds, wound care, etc )  - Arrange for interpretive services to assist at discharge as needed  - Refer to Case Management Department for coordinating discharge planning if the patient needs post-hospital services based on physician/advanced practitioner order or complex needs related to functional status, cognitive ability, or social support system  Outcome: Completed

## 2019-12-22 ENCOUNTER — HOSPITAL ENCOUNTER (INPATIENT)
Facility: HOSPITAL | Age: 54
LOS: 1 days | Discharge: HOME | DRG: 897 | End: 2019-12-24
Attending: EMERGENCY MEDICINE | Admitting: FAMILY MEDICINE
Payer: MEDICARE

## 2019-12-22 DIAGNOSIS — T39.1X2D INTENTIONAL ACETAMINOPHEN OVERDOSE, SUBSEQUENT ENCOUNTER: ICD-10-CM

## 2019-12-22 DIAGNOSIS — Z72.0 TOBACCO ABUSE: ICD-10-CM

## 2019-12-22 DIAGNOSIS — T50.902A INTENTIONAL DRUG OVERDOSE (HCC): ICD-10-CM

## 2019-12-22 DIAGNOSIS — F10.929 ALCOHOL INTOXICATION (HCC): Primary | ICD-10-CM

## 2019-12-22 DIAGNOSIS — F10.10 ALCOHOL ABUSE: ICD-10-CM

## 2019-12-22 DIAGNOSIS — F32.A DEPRESSION: ICD-10-CM

## 2019-12-22 DIAGNOSIS — I10 HTN (HYPERTENSION): ICD-10-CM

## 2019-12-22 DIAGNOSIS — R41.82 ALTERED MENTAL STATUS: ICD-10-CM

## 2019-12-22 DIAGNOSIS — F10.239 ALCOHOL WITHDRAWAL (HCC): ICD-10-CM

## 2019-12-22 DIAGNOSIS — E86.0 DEHYDRATION: ICD-10-CM

## 2019-12-22 DIAGNOSIS — F10.230 ALCOHOL WITHDRAWAL SYNDROME WITHOUT COMPLICATION (HCC): ICD-10-CM

## 2019-12-22 LAB
ALBUMIN SERPL BCP-MCNC: 3.4 G/DL (ref 3–5.2)
ALBUMIN SERPL BCP-MCNC: 4.7 G/DL (ref 3–5.2)
ALP SERPL-CCNC: 52 U/L (ref 43–122)
ALP SERPL-CCNC: 86 U/L (ref 43–122)
ALT SERPL W P-5'-P-CCNC: 53 U/L (ref 9–52)
ALT SERPL W P-5'-P-CCNC: 64 U/L (ref 9–52)
AMPHETAMINES SERPL QL SCN: NEGATIVE
ANION GAP SERPL CALCULATED.3IONS-SCNC: 10 MMOL/L (ref 5–14)
ANION GAP SERPL CALCULATED.3IONS-SCNC: 18 MMOL/L (ref 5–14)
APAP SERPL-MCNC: <10 UG/ML (ref 10–20)
APAP SERPL-MCNC: <10 UG/ML (ref 10–20)
APTT PPP: 26 SECONDS (ref 25–32)
APTT PPP: 26 SECONDS (ref 25–32)
AST SERPL W P-5'-P-CCNC: 38 U/L (ref 14–36)
AST SERPL W P-5'-P-CCNC: 41 U/L (ref 14–36)
BARBITURATES UR QL: NEGATIVE
BASE EX.OXY STD BLDV CALC-SCNC: 82.9 %
BASE EXCESS BLDV CALC-SCNC: 0.4 MMOL/L (ref -2.1–2.1)
BENZODIAZ UR QL: NEGATIVE
BILIRUB SERPL-MCNC: 0.6 MG/DL
BILIRUB SERPL-MCNC: 0.6 MG/DL
BILIRUB UR QL STRIP: NEGATIVE
BUN SERPL-MCNC: 8 MG/DL (ref 5–25)
BUN SERPL-MCNC: 9 MG/DL (ref 5–25)
CALCIUM SERPL-MCNC: 7.8 MG/DL (ref 8.4–10.2)
CALCIUM SERPL-MCNC: 9.4 MG/DL (ref 8.4–10.2)
CHLORIDE SERPL-SCNC: 103 MMOL/L (ref 97–108)
CHLORIDE SERPL-SCNC: 109 MMOL/L (ref 97–108)
CK MB SERPL-MCNC: 1.1 % (ref 0–2.5)
CK MB SERPL-MCNC: 2.9 NG/ML (ref 0–2.4)
CK SERPL-CCNC: 262 U/L (ref 30–135)
CLARITY UR: CLEAR
CO2 SERPL-SCNC: 21 MMOL/L (ref 22–30)
CO2 SERPL-SCNC: 22 MMOL/L (ref 22–30)
COCAINE UR QL: NEGATIVE
COLOR UR: NORMAL
CREAT SERPL-MCNC: 0.6 MG/DL (ref 0.6–1.2)
CREAT SERPL-MCNC: 0.7 MG/DL (ref 0.6–1.2)
ERYTHROCYTE [DISTWIDTH] IN BLOOD BY AUTOMATED COUNT: 13.9 %
ETHANOL SERPL-MCNC: 223 MG/DL (ref 0–10)
ETHANOL SERPL-MCNC: 315 MG/DL (ref 0–10)
GFR SERPL CREATININE-BSD FRML MDRD: 100 ML/MIN/1.73SQ M
GFR SERPL CREATININE-BSD FRML MDRD: 105 ML/MIN/1.73SQ M
GLUCOSE SERPL-MCNC: 162 MG/DL (ref 70–99)
GLUCOSE SERPL-MCNC: 174 MG/DL (ref 70–99)
GLUCOSE UR STRIP-MCNC: NEGATIVE MG/DL
HCO3 BLDV-SCNC: 23.3 MMOL/L (ref 23–28)
HCT VFR BLD AUTO: 40.7 % (ref 36–46)
HGB BLD-MCNC: 14 G/DL (ref 12–16)
HGB UR QL STRIP.AUTO: NEGATIVE
INR PPP: 0.87 (ref 0.91–1.09)
INR PPP: 0.92 (ref 0.91–1.09)
KETONES UR STRIP-MCNC: NEGATIVE MG/DL
LACTATE SERPL-SCNC: 2.8 MMOL/L (ref 0.7–2)
LACTATE SERPL-SCNC: 5.5 MMOL/L (ref 0.7–2)
LEUKOCYTE ESTERASE UR QL STRIP: NEGATIVE
LYMPHOCYTES # BLD AUTO: 25 % (ref 25–45)
LYMPHOCYTES # BLD AUTO: 3.9 THOUSAND/UL (ref 0.5–4)
MAGNESIUM SERPL-MCNC: 2.6 MG/DL (ref 1.6–2.3)
MCH RBC QN AUTO: 33.3 PG (ref 26–34)
MCHC RBC AUTO-ENTMCNC: 34.4 G/DL (ref 31–36)
MCV RBC AUTO: 97 FL (ref 80–100)
METHADONE UR QL: NEGATIVE
MONOCYTES # BLD AUTO: 1.4 THOUSAND/UL (ref 0.2–0.9)
MONOCYTES NFR BLD AUTO: 9 % (ref 1–10)
NEUTS BAND NFR BLD MANUAL: 1 % (ref 0–8)
NEUTS SEG # BLD: 10.3 THOUSAND/UL (ref 1.8–7.8)
NEUTS SEG NFR BLD AUTO: 65 %
NITRITE UR QL STRIP: NEGATIVE
O2 CT BLDV-SCNC: 17 ML/DL
OPIATES UR QL SCN: NEGATIVE
PCO2 BLDV: 32 MM HG (ref 41–51)
PCP UR QL: NEGATIVE
PH BLDV: 7.47 [PH] (ref 7.35–7.45)
PH UR STRIP.AUTO: 6 [PH]
PLATELET # BLD AUTO: 503 THOUSANDS/UL (ref 150–450)
PLATELET BLD QL SMEAR: ABNORMAL
PMV BLD AUTO: 7.2 FL (ref 8.9–12.7)
PO2 BLDV: 50 MM HG
POTASSIUM SERPL-SCNC: 4 MMOL/L (ref 3.6–5)
POTASSIUM SERPL-SCNC: 4.5 MMOL/L (ref 3.6–5)
PROT SERPL-MCNC: 6.1 G/DL (ref 5.9–8.4)
PROT SERPL-MCNC: 8.3 G/DL (ref 5.9–8.4)
PROT UR STRIP-MCNC: NEGATIVE MG/DL
PROTHROMBIN TIME: 10.2 SECONDS (ref 9.8–12)
PROTHROMBIN TIME: 9.6 SECONDS (ref 9.8–12)
RBC # BLD AUTO: 4.21 MILLION/UL (ref 4–5.2)
RBC MORPH BLD: NORMAL
SALICYLATES SERPL-MCNC: <1 MG/DL (ref 10–30)
SALICYLATES SERPL-MCNC: <1 MG/DL (ref 10–30)
SODIUM SERPL-SCNC: 141 MMOL/L (ref 137–147)
SODIUM SERPL-SCNC: 142 MMOL/L (ref 137–147)
SP GR UR STRIP.AUTO: 1.01 (ref 1–1.04)
THC UR QL: NEGATIVE
TOTAL CELLS COUNTED SPEC: 100
TROPONIN I SERPL-MCNC: 0.02 NG/ML (ref 0–0.03)
TSH SERPL DL<=0.05 MIU/L-ACNC: 3.38 UIU/ML (ref 0.47–4.68)
UROBILINOGEN UA: NEGATIVE MG/DL
WBC # BLD AUTO: 15.6 THOUSAND/UL (ref 4.5–11)

## 2019-12-22 PROCEDURE — NC001 PR NO CHARGE: Performed by: EMERGENCY MEDICINE

## 2019-12-22 PROCEDURE — 80329 ANALGESICS NON-OPIOID 1 OR 2: CPT | Performed by: EMERGENCY MEDICINE

## 2019-12-22 PROCEDURE — 96360 HYDRATION IV INFUSION INIT: CPT

## 2019-12-22 PROCEDURE — 99285 EMERGENCY DEPT VISIT HI MDM: CPT | Performed by: EMERGENCY MEDICINE

## 2019-12-22 PROCEDURE — 96374 THER/PROPH/DIAG INJ IV PUSH: CPT

## 2019-12-22 PROCEDURE — 82805 BLOOD GASES W/O2 SATURATION: CPT | Performed by: EMERGENCY MEDICINE

## 2019-12-22 PROCEDURE — 85007 BL SMEAR W/DIFF WBC COUNT: CPT | Performed by: EMERGENCY MEDICINE

## 2019-12-22 PROCEDURE — 85027 COMPLETE CBC AUTOMATED: CPT | Performed by: EMERGENCY MEDICINE

## 2019-12-22 PROCEDURE — 85730 THROMBOPLASTIN TIME PARTIAL: CPT | Performed by: EMERGENCY MEDICINE

## 2019-12-22 PROCEDURE — 83735 ASSAY OF MAGNESIUM: CPT | Performed by: EMERGENCY MEDICINE

## 2019-12-22 PROCEDURE — 80053 COMPREHEN METABOLIC PANEL: CPT | Performed by: EMERGENCY MEDICINE

## 2019-12-22 PROCEDURE — 96361 HYDRATE IV INFUSION ADD-ON: CPT

## 2019-12-22 PROCEDURE — 80307 DRUG TEST PRSMV CHEM ANLYZR: CPT | Performed by: EMERGENCY MEDICINE

## 2019-12-22 PROCEDURE — 99285 EMERGENCY DEPT VISIT HI MDM: CPT

## 2019-12-22 PROCEDURE — 36415 COLL VENOUS BLD VENIPUNCTURE: CPT | Performed by: EMERGENCY MEDICINE

## 2019-12-22 PROCEDURE — 93005 ELECTROCARDIOGRAM TRACING: CPT

## 2019-12-22 PROCEDURE — 84484 ASSAY OF TROPONIN QUANT: CPT | Performed by: EMERGENCY MEDICINE

## 2019-12-22 PROCEDURE — 84443 ASSAY THYROID STIM HORMONE: CPT | Performed by: EMERGENCY MEDICINE

## 2019-12-22 PROCEDURE — 80320 DRUG SCREEN QUANTALCOHOLS: CPT | Performed by: EMERGENCY MEDICINE

## 2019-12-22 PROCEDURE — 82553 CREATINE MB FRACTION: CPT | Performed by: EMERGENCY MEDICINE

## 2019-12-22 PROCEDURE — 82550 ASSAY OF CK (CPK): CPT | Performed by: EMERGENCY MEDICINE

## 2019-12-22 PROCEDURE — 85610 PROTHROMBIN TIME: CPT | Performed by: EMERGENCY MEDICINE

## 2019-12-22 PROCEDURE — 83605 ASSAY OF LACTIC ACID: CPT | Performed by: EMERGENCY MEDICINE

## 2019-12-22 RX ORDER — LORAZEPAM 0.5 MG/1
1 TABLET ORAL ONCE
Status: COMPLETED | OUTPATIENT
Start: 2019-12-22 | End: 2019-12-22

## 2019-12-22 RX ORDER — DIAZEPAM 5 MG/1
5 TABLET ORAL ONCE
Status: COMPLETED | OUTPATIENT
Start: 2019-12-22 | End: 2019-12-22

## 2019-12-22 RX ORDER — LORAZEPAM 2 MG/ML
1 INJECTION INTRAMUSCULAR ONCE
Status: COMPLETED | OUTPATIENT
Start: 2019-12-22 | End: 2019-12-22

## 2019-12-22 RX ORDER — NICOTINE 21 MG/24HR
21 PATCH, TRANSDERMAL 24 HOURS TRANSDERMAL ONCE
Status: COMPLETED | OUTPATIENT
Start: 2019-12-22 | End: 2019-12-23

## 2019-12-22 RX ADMIN — SODIUM CHLORIDE 1000 ML: 0.9 INJECTION, SOLUTION INTRAVENOUS at 18:11

## 2019-12-22 RX ADMIN — LORAZEPAM 1 MG: 2 INJECTION INTRAMUSCULAR; INTRAVENOUS at 18:28

## 2019-12-22 RX ADMIN — DIAZEPAM 5 MG: 5 TABLET ORAL at 23:28

## 2019-12-22 RX ADMIN — NICOTINE 21 MG: 21 PATCH TRANSDERMAL at 20:40

## 2019-12-22 RX ADMIN — LORAZEPAM 1 MG: 0.5 TABLET ORAL at 20:57

## 2019-12-22 RX ADMIN — SODIUM CHLORIDE 1000 ML: 0.9 INJECTION, SOLUTION INTRAVENOUS at 22:16

## 2019-12-22 RX ADMIN — SODIUM CHLORIDE 1000 ML: 0.9 INJECTION, SOLUTION INTRAVENOUS at 19:37

## 2019-12-22 NOTE — ED PROVIDER NOTES
History  Chief Complaint   Patient presents with    Overdose - Intentional     states took tylenol PM and drinking; states wants to kill self  Just want to die ; States took a lot of medications; states doesn't know why I want to die  Patient is a 72-year-old female with a history of PTSD, hypertension, hyperlipidemia, alcoholism, bipolar disorder with multiple suicide attempts coming in today after being discharged several days ago for similar events  Patient states she just wants to die  She keeps repeating this  She will be hysterically crying and laid down and then started to yell out other staff members  She is office intoxicated 1st denies this but then admits to drinking a lot  She states that she took a bunch of Tylenol p m  But cannot remember when  She also reports that she took a bunch medication for refuses to tell me what  She just keeps stating that she wants to die      History provided by:  Patient  Overdose - Intentional   Ingested substance:  Alcohol, OTC medication and prescription medication  Incident location:  Home  Context: depression, intentional ingestion, mental illness and suicide attempt    Associated symptoms: anxiety    Associated symptoms: no abdominal pain, no chest pain, no diaphoresis, no nausea, no shortness of breath and no vomiting    Risk factors: hx of self injury, hx of suicide attempts and similar prior episodes        Prior to Admission Medications   Prescriptions Last Dose Informant Patient Reported? Taking?    cloNIDine (CATAPRES) 0 1 mg tablet   No No   Sig: Take 1 tablet (0 1 mg total) by mouth 2 (two) times a day   doxepin (SINEquan) 50 mg capsule   No No   Sig: Take 1 capsule (50 mg total) by mouth daily at bedtime   folic acid (FOLVITE) 1 mg tablet   No No   Sig: Take 1 tablet (1 mg total) by mouth daily   gabapentin (NEURONTIN) 300 mg capsule   No No   Sig: Take 2 capsules (600 mg total) by mouth daily at bedtime   gabapentin (NEURONTIN) 400 mg capsule No No   Sig: Take 1 capsule (400 mg total) by mouth 2 (two) times a day   lamoTRIgine (LaMICtal) 25 mg tablet   No No   Sig: Take 2 tablets (50 mg total) by mouth daily   levothyroxine 50 mcg tablet   No No   Sig: Take 1 tablet (50 mcg total) by mouth daily   lisinopril (ZESTRIL) 20 mg tablet   No No   Sig: Take 1 tablet (20 mg total) by mouth daily   lurasidone (LATUDA) 80 mg tablet   No No   Sig: Take 1 tablet (80 mg total) by mouth daily with dinner   metoprolol tartrate (LOPRESSOR) 50 mg tablet   No No   Sig: Take 1 tablet (50 mg total) by mouth every 12 (twelve) hours   pantoprazole (PROTONIX) 40 mg tablet   No No   Sig: Take 1 tablet (40 mg total) by mouth daily in the early morning   sucralfate (CARAFATE) 1 g tablet   No No   Sig: Take 1 tablet (1 g total) by mouth 4 (four) times a day (before meals and at bedtime)   thiamine 100 MG tablet   No No   Sig: Take 1 tablet (100 mg total) by mouth daily   traZODone (DESYREL) 100 mg tablet   No No   Sig: Take 2 tablets (200 mg total) by mouth daily at bedtime   venlafaxine (EFFEXOR-XR) 75 mg 24 hr capsule   No No   Sig: Take 3 capsules (225 mg total) by mouth daily      Facility-Administered Medications: None       Past Medical History:   Diagnosis Date    Alcoholism ([place] [address] )     Anxiety     Bipolar disorder (HCC)     Bowel obstruction (HCC)     Gastritis     Head injury     Heart palpitations     Hyperlipidemia     Hypertension     Hypothyroidism     PTSD (post-traumatic stress disorder)     Seizure ([address] )     Suicide attempt [facility])        Past Surgical History:   Procedure Laterality Date    ABDOMINAL SURGERY      APPENDECTOMY      BOWEL RESECTION      CHOLECYSTECTOMY      ESOPHAGOGASTRODUODENOSCOPY N/A 5/18/2018    Procedure: ESOPHAGOGASTRODUODENOSCOPY (EGD) with bx;  Surgeon: Fransico Wilson DO;  Location: [facility];   Service: Gastroenterology    HYSTERECTOMY      KNEE SURGERY Bilateral        Family History   Problem Relation Age of Onset    Diabetes Father      I have reviewed and agree with the history as documented  Social History     Tobacco Use    Smoking status: Current Every Day Smoker     Packs/day: 1 00     Years: 25 00     Pack years: 25 00     Types: Cigarettes    Smokeless tobacco: Never Used   Substance Use Topics    Alcohol use: Yes     Alcohol/week: 21 0 standard drinks     Types: 21 Cans of beer per week     Frequency: 2-3 times a week     Drinks per session: 3 or 4     Binge frequency: Weekly     Comment: As of 11/14, reports 7 large beers about 2-3 times a week   Drug use: No        Review of Systems   Constitutional: Negative  Negative for diaphoresis and fever  HENT: Negative  Negative for ear pain and sore throat  Eyes: Negative  Negative for visual disturbance  Respiratory: Negative  Negative for chest tightness and shortness of breath  Cardiovascular: Negative  Negative for chest pain and palpitations  Gastrointestinal: Negative  Negative for abdominal pain, nausea and vomiting  Genitourinary: Negative  Negative for difficulty urinating and dysuria  Musculoskeletal: Negative  Negative for back pain and neck pain  Skin: Negative  Negative for rash  Neurological: Negative for weakness  Psychiatric/Behavioral: Positive for self-injury and suicidal ideas  Negative for confusion  The patient is nervous/anxious  All other systems reviewed and are negative  Physical Exam  Physical Exam   Constitutional: She is oriented to person, place, and time  She appears well-developed and well-nourished  No distress  Alcohol on breath   HENT:   Head: Normocephalic and atraumatic  Patient is maintaining airway maintaining secretions  Uvula midline   Eyes: Pupils are equal, round, and reactive to light  Conjunctivae and EOM are normal    Neck: Normal range of motion  Neck supple  Cardiovascular: Regular rhythm, normal heart sounds and intact distal pulses  Tachycardia present     No murmur heard   Pulses:       Radial pulses are 2+ on the right side, and 2+ on the left side  Dorsalis pedis pulses are 2+ on the right side, and 2+ on the left side  Pulmonary/Chest: Effort normal and breath sounds normal  No stridor  No respiratory distress  Abdominal: Soft  Bowel sounds are normal  She exhibits no distension  There is no tenderness  Musculoskeletal: Normal range of motion  She exhibits no edema  Neurological: She is alert and oriented to person, place, and time  No cranial nerve deficit  GCS eye subscore is 4  GCS verbal subscore is 5  GCS motor subscore is 6  No slurred speech  No facial asymmetry   Skin: Skin is warm  Capillary refill takes less than 2 seconds  She is not diaphoretic  Psychiatric: Her mood appears anxious  She is agitated  She expresses impulsivity and inappropriate judgment  She exhibits a depressed mood  She expresses suicidal ideation  She expresses suicidal plans  Nursing note and vitals reviewed        Vital Signs  ED Triage Vitals   Temperature Pulse Respirations Blood Pressure SpO2   12/22/19 1746 12/22/19 1746 12/22/19 1746 12/22/19 1746 12/22/19 1746   98 6 °F (37 °C) (!) 175 (!) 28 (!) 189/113 97 %      Temp Source Heart Rate Source Patient Position - Orthostatic VS BP Location FiO2 (%)   12/22/19 1746 12/22/19 1746 12/22/19 1746 12/22/19 1829 --   Tympanic Monitor Lying Left arm       Pain Score       12/22/19 1746       No Pain           Vitals:    12/22/19 1746 12/22/19 1829 12/22/19 1838 12/22/19 2219   BP: (!) 189/113 155/90 155/90 147/85   Pulse: (!) 175 (!) 119 (!) 119 (!) 111   Patient Position - Orthostatic VS: Lying Lying  Lying         Visual Acuity      ED Medications  Medications   nicotine (NICODERM CQ) 21 mg/24 hr TD 24 hr patch 21 mg (21 mg Transdermal Medication Applied 12/22/19 2040)   sodium chloride 0 9 % bolus 1,000 mL (1,000 mL Intravenous New Bag 12/22/19 2216)   sodium chloride 0 9 % bolus 1,000 mL (0 mL Intravenous Stopped 12/22/19 1937)   LORazepam (ATIVAN) 2 mg/mL injection 1 mg (1 mg Intravenous Given 12/22/19 1828)   sodium chloride 0 9 % bolus 1,000 mL (0 mL Intravenous Stopped 12/22/19 2212)   LORazepam (ATIVAN) tablet 1 mg (1 mg Oral Given 12/22/19 2057)       Diagnostic Studies  Results Reviewed     Procedure Component Value Units Date/Time    Salicylate level [[id number]]  (Abnormal) Collected:  12/22/19 2222    Lab Status:  Final result Specimen:  Blood from Arm, Right Updated:  28/04/53 7497     Salicylate Lvl <6 1 mg/dL     Narrative:       Hemolysis    Acetaminophen level-If concentration is detectable, please discuss with medical  on call   [[id number]]  (Abnormal) Collected:  12/22/19 2222    Lab Status:  Final result Specimen:  Blood from Arm, Right Updated:  12/22/19 2248     Acetaminophen Level <10 ug/mL     Narrative:       Hemolysis    Comprehensive metabolic panel [[id number]]  (Abnormal) Collected:  12/22/19 2222    Lab Status:  Final result Specimen:  Blood from Arm, Right Updated:  12/22/19 2248     Sodium 141 mmol/L      Potassium 4 5 mmol/L      Chloride 109 mmol/L      CO2 22 mmol/L      ANION GAP 10 mmol/L      BUN 8 mg/dL      Creatinine 0 60 mg/dL      Glucose 174 mg/dL      Calcium 7 8 mg/dL      AST 38 U/L      ALT 53 U/L      Alkaline Phosphatase 52 U/L      Total Protein 6 1 g/dL      Albumin 3 4 g/dL      Total Bilirubin 0 60 mg/dL      eGFR 105 ml/min/1 73sq m     Narrative:       Hemolysis  [employer] guidelines for Chronic Kidney Disease (CKD):     Stage 1 with normal or high GFR (GFR > 90 mL/min/1 73 square meters)    Stage 2 Mild CKD (GFR = 60-89 mL/min/1 73 square meters)    Stage 3A Moderate CKD (GFR = 45-59 mL/min/1 73 square meters)    Stage 3B Moderate CKD (GFR = 30-44 mL/min/1 73 square meters)    Stage 4 Severe CKD (GFR = 15-29 mL/min/1 73 square meters)    Stage 5 End Stage CKD (GFR <15 mL/min/1 73 square meters)  Note: GFR calculation is accurate only with a steady state creatinine    Ethanol [[id number]]  (Abnormal) Collected:  12/22/19 2222    Lab Status:  Final result Specimen:  Blood from Arm, Right Updated:  12/22/19 2246     Ethanol Lvl 223 mg/dL     Protime-INR [[id number]]  (Normal) Collected:  12/22/19 2222    Lab Status:  Final result Specimen:  Blood from Arm, Right Updated:  12/22/19 2243     Protime 10 2 seconds      INR 0 92    APTT [[id number]]  (Normal) Collected:  12/22/19 2222    Lab Status:  Final result Specimen:  Blood from Arm, Right Updated:  12/22/19 2243     PTT 26 seconds     Lactic acid, plasma [[id number]]  (Abnormal) Collected:  12/22/19 2222    Lab Status:  Final result Specimen:  Blood from Arm, Right Updated:  12/22/19 2241     LACTIC ACID 2 8 mmol/L     Narrative:       Result may be elevated if tourniquet was used during collection  Rapid drug screen, urine [[id number]]  (Normal) Collected:  12/22/19 1831    Lab Status:  Final result Specimen:  Urine, Clean Catch Updated:  12/22/19 1916     Amph/Meth UR Negative     Barbiturate Ur Negative     Benzodiazepine Urine Negative     Cocaine Urine Negative     Methadone Urine Negative     Opiate Urine Negative     PCP Ur Negative     THC Urine Negative    Narrative:       FOR MEDICAL PURPOSES ONLY  IF CONFIRMATION NEEDED PLEASE CONTACT THE LAB WITHIN 5 DAYS  Drug Screen Cutoff Levels:  AMPHETAMINE/METHAMPHETAMINES  1000 ng/mL  BARBITURATES     200 ng/mL  BENZODIAZEPINES     200 ng/mL  COCAINE      300 ng/mL  METHADONE      300 ng/mL  OPIATES      300 ng/mL  PHENCYCLIDINE     25 ng/mL  THC       50 ng/mL      TSH [[id number]]  (Normal) Collected:  12/22/19 1812    Lab Status:  Final result Specimen:  Blood from Arm, Right Updated:  12/22/19 1916     TSH 3RD GENERATON 3 380 uIU/mL     Narrative:       Patients undergoing fluorescein dye angiography may retain small amounts of fluorescein in the body for 48-72 hours post procedure   Samples containing fluorescein can produce falsely depressed TSH values  If the patient had this procedure,a specimen should be resubmitted post fluorescein clearance  UA (URINE) with reflex to Scope [[id number]]  (Normal) Collected:  12/22/19 1831    Lab Status:  Final result Specimen:  Urine, Clean Catch Updated:  12/22/19 1857     Color, UA Straw     Clarity, UA Clear     Specific Gravity, UA 1 010     pH, UA 6 0     Leukocytes, UA Negative     Nitrite, UA Negative     Protein, UA Negative mg/dl      Glucose, UA Negative mg/dl      Ketones, UA Negative mg/dl      Bilirubin, UA Negative     Blood, UA Negative     UROBILINOGEN UA Negative mg/dL     CKMB [[id number]]  (Abnormal) Collected:  12/22/19 1812    Lab Status:  Final result Specimen:  Blood from Arm, Right Updated:  12/22/19 1857     CK-MB Index 1 1 %      CK-MB 2 9 ng/mL     Troponin I [[id number]]  (Normal) Collected:  12/22/19 1812    Lab Status:  Final result Specimen:  Blood from Arm, Right Updated:  12/22/19 1848     Troponin I 0 02 ng/mL     APTT [[id number]]  (Normal) Collected:  12/22/19 1812    Lab Status:  Final result Specimen:  Blood from Arm, Right Updated:  12/22/19 1848     PTT 26 seconds     Protime-INR [[id number]]  (Abnormal) Collected:  12/22/19 1812    Lab Status:  Final result Specimen:  Blood from Arm, Right Updated:  12/22/19 1848     Protime 9 6 seconds      INR 0 87    Ethanol [[id number]]  (Abnormal) Collected:  12/22/19 1812    Lab Status:  Final result Specimen:  Blood from Arm, Right Updated:  12/22/19 1845     Ethanol Lvl 315 mg/dL     Lactic acid, plasma x2 [[id number]]  (Abnormal) Collected:  12/22/19 1812    Lab Status:  Final result Specimen:  Blood from Arm, Right Updated:  12/22/19 1840     LACTIC ACID 5 5 mmol/L     Narrative:       Result may be elevated if tourniquet was used during collection      CBC and differential [[id number]]  (Abnormal) Collected:  12/22/19 1812    Lab Status:  Final result Specimen:  Blood from Arm, Right Updated:  12/22/19 1838     WBC 15 60 Thousand/uL      RBC 4 21 Million/uL      Hemoglobin 14 0 g/dL      Hematocrit 40 7 %      MCV 97 fL      MCH 33 3 pg      MCHC 34 4 g/dL      RDW 13 9 %      MPV 7 2 fL      Platelets 272 Thousands/uL     Salicylate level [[id number]]  (Abnormal) Collected:  12/22/19 1812    Lab Status:  Final result Specimen:  Blood from Arm, Right Updated:  70/38/34 9480     Salicylate Lvl <4 4 mg/dL     Magnesium [[id number]]  (Abnormal) Collected:  12/22/19 1812    Lab Status:  Final result Specimen:  Blood from Arm, Right Updated:  12/22/19 1838     Magnesium 2 6 mg/dL     CK Total with Reflex CKMB [[id number]]  (Abnormal) Collected:  12/22/19 1812    Lab Status:  Final result Specimen:  Blood from Arm, Right Updated:  12/22/19 1837     Total  U/L     Acetaminophen level-If concentration is detectable, please discuss with medical  on call   [[id number]]  (Abnormal) Collected:  12/22/19 1812    Lab Status:  Final result Specimen:  Blood from Arm, Right Updated:  12/22/19 1837     Acetaminophen Level <10 ug/mL     Comprehensive metabolic panel [[id number]]  (Abnormal) Collected:  12/22/19 1812    Lab Status:  Final result Specimen:  Blood from Arm, Right Updated:  12/22/19 1837     Sodium 142 mmol/L      Potassium 4 0 mmol/L      Chloride 103 mmol/L      CO2 21 mmol/L      ANION GAP 18 mmol/L      BUN 9 mg/dL      Creatinine 0 70 mg/dL      Glucose 162 mg/dL      Calcium 9 4 mg/dL      AST 41 U/L      ALT 64 U/L      Alkaline Phosphatase 86 U/L      Total Protein 8 3 g/dL      Albumin 4 7 g/dL      Total Bilirubin 0 60 mg/dL      eGFR 100 ml/min/1 73sq m     Narrative:       [facility] guidelines for Chronic Kidney Disease (CKD):     Stage 1 with normal or high GFR (GFR > 90 mL/min/1 73 square meters)    Stage 2 Mild CKD (GFR = 60-89 mL/min/1 73 square meters)    Stage 3A Moderate CKD (GFR = 45-59 mL/min/1 73 square meters)    Stage 3B Moderate CKD (GFR = 30-44 mL/min/1 73 square meters)    Stage 4 Severe CKD (GFR = 15-29 mL/min/1 73 square meters)    Stage 5 End Stage CKD (GFR <15 mL/min/1 73 square meters)  Note: GFR calculation is accurate only with a steady state creatinine    Blood gas, venous [[id number]]  (Abnormal) Collected:  12/22/19 1812    Lab Status:  Final result Specimen:  Blood from Arm, Right Updated:  12/22/19 1831     pH, Andrzej 7 470     pCO2, Andrzej 32 0 mm Hg      pO2, Andrzej 50 0 mm Hg      HCO3, Andrzej 23 3 mmol/L      Base Excess, Andrzej 0 4 mmol/L      O2 Content, Andrzej 17 0 ml/dL      O2 HGB, VENOUS 82 9 %     Lactic acid, plasma x2 [[id number]]     Lab Status:  No result Specimen:  Blood                  No orders to display              Procedures  Procedures         ED Course  ED Course as of Dec 23 2332   Sun Dec 22, 2019   1753 Patient is a 16-year-old female well known to this facility coming in today with an overdose  Patient is fluctuating between crying and then yelling  She states that she took overdose of all medications  She is obviously intoxicated  Await family arrival      Portions of the record may have been created with voice recognition software  Occasional wrong word or "sound a like" substitutions may have occurred due to the inherent limitations of voice recognition software  Read the chart carefully and recognize, using context, where substitutions have occurred  [facility]  PATIENT WITH INCREASE IN AGITATION BEHAVIOR THROUGHOUT THE DAY  SOME STATES THAT SHE WAS UP AND DOWN A STAIR AND NOTICED THAT SHE WAS DRINKING  WHEN I ASKED HIM IF HE REMOVED ANY ALCOHOL OR DRUGS FROM THE HOUSE HE STATES "SHE WILL FIND A WAY TO GET IT BUT I DO NOT CONTROL IT"      4641 Patient resting more commonly after Ativan  She was very difficult to redirect as she kept spitting on staff and hysterically crying  She is resting more comfortably after Ativan  [facility] have been reviewed  Patient with elevated lactate    We will give 2 L and repeat  Pending alcohol     Reviewed LFTs and decreased compared to prior  [id number] Patient with negative troponin  ETOH> 300  Will call poison control for further assistance  They discussed and suggest repeat labs in 4 hours regarding tylenol and CMP  No acyteacystine  Case #  [id number]      2103 Third L IV fluid given  Pending repeat labs at 10  Patient resting in bed and becoming agitated  We gave her nicotine patch and will give her an oral dose of Ativan      2235 Patient resting in bed  Pending repeat labs      2243 Repeat labs show lactate down to 2 8  Coag stable  Pending alcohol and CMP      2249 Patient's alcohol as down to 223  Given that she metabolize is approximately 22 an hours she will not be sober for another 6-7 hours  Her CMP is resulted and LFTs are trending downward with a negative salicylate and acetaminophen  Patient is still clinically and medically intoxicated  Cannot medically clear her at this time  She is resting in bed  Mon Dec 23, 2019   3459 Patient is restless  Will re-dose Valium  Will sign out to Dr Christiano Martinez                                   [facility]  Number of Diagnoses or Management Options  Diagnosis management comments:     EKG INTERPRETATION at 6:14 p m  RHYTHM:  Sinus tachy at 135 beats per minute  AXIS:  Normal axis  INTERVALS:  Pr interval measured at 132 milliseconds  QRS COMPLEX:  QRS measured at 74 milliseconds  ST SEGMENT:  Nonspecific ST segment changes  Diffuse artifact  QT INTERVAL:  QTC measured at 456 milliseconds  COMPARED WITH PRIOR   Julian Calderon   Interpretation by Edilson Caldwell DO         Amount and/or Complexity of Data Reviewed  Clinical lab tests: ordered  Tests in the medicine section of CPT®: ordered          Disposition  Final diagnoses:   Alcohol intoxication ([address] )   Alcohol abuse   Depression   Altered mental status   Dehydration   HTN (hypertension)     Time reflects when diagnosis was documented in both MDM as applicable and the Disposition within this note     Time User Action Codes Description Comment    12/22/2019  9:10 PM Lazara Berrios Add [F10 929] Alcohol intoxication ([address] )     12/22/2019  9:10 PM Bendmarylou, Maggie Gains L Add [F10 10] Alcohol abuse     12/22/2019  9:10 PM BendTrista hickman Add [F32 9] Depression     12/22/2019  9:10 PM BendCarlos hickmanle L Add [R41 82] Altered mental status     12/22/2019  9:10 PM Bendmarylou Lucia L Add [E86 0] Dehydration     12/22/2019  9:10 PM Bendmarylou Maggie Gains L Add [I10] HTN (hypertension)       ED Disposition     None      Follow-up Information    None         Patient's Medications   Discharge Prescriptions    No medications on file     No discharge procedures on file      ED Provider  Electronically Signed by           Mitchell Fermin DO  12/23/19 2184

## 2019-12-23 PROBLEM — E87.20 LACTIC ACIDOSIS: Status: ACTIVE | Noted: 2019-12-23

## 2019-12-23 PROBLEM — F10.230 ALCOHOL WITHDRAWAL SYNDROME WITHOUT COMPLICATION (HCC): Status: ACTIVE | Noted: 2019-12-23

## 2019-12-23 PROBLEM — D72.829 LEUKOCYTOSIS: Status: ACTIVE | Noted: 2019-12-23

## 2019-12-23 PROBLEM — E87.2 LACTIC ACIDOSIS: Status: ACTIVE | Noted: 2019-12-23

## 2019-12-23 PROBLEM — F10.930 ALCOHOL WITHDRAWAL SYNDROME WITHOUT COMPLICATION (HCC): Status: ACTIVE | Noted: 2019-12-23

## 2019-12-23 LAB
ATRIAL RATE: 135 BPM
ETHANOL EXG-MCNC: 0.08 MG/DL
P AXIS: 53 DEGREES
PR INTERVAL: 132 MS
QRS AXIS: 40 DEGREES
QRSD INTERVAL: 74 MS
QT INTERVAL: 304 MS
QTC INTERVAL: 456 MS
T WAVE AXIS: 48 DEGREES
VENTRICULAR RATE: 135 BPM

## 2019-12-23 PROCEDURE — 82075 ASSAY OF BREATH ETHANOL: CPT | Performed by: EMERGENCY MEDICINE

## 2019-12-23 PROCEDURE — 99222 1ST HOSP IP/OBS MODERATE 55: CPT | Performed by: PSYCHIATRY & NEUROLOGY

## 2019-12-23 PROCEDURE — 96375 TX/PRO/DX INJ NEW DRUG ADDON: CPT

## 2019-12-23 PROCEDURE — 96374 THER/PROPH/DIAG INJ IV PUSH: CPT

## 2019-12-23 PROCEDURE — 99223 1ST HOSP IP/OBS HIGH 75: CPT | Performed by: FAMILY MEDICINE

## 2019-12-23 PROCEDURE — 93010 ELECTROCARDIOGRAM REPORT: CPT | Performed by: INTERNAL MEDICINE

## 2019-12-23 RX ORDER — CHLORDIAZEPOXIDE HYDROCHLORIDE 25 MG/1
25 CAPSULE, GELATIN COATED ORAL EVERY 8 HOURS SCHEDULED
Status: DISCONTINUED | OUTPATIENT
Start: 2019-12-23 | End: 2019-12-24 | Stop reason: HOSPADM

## 2019-12-23 RX ORDER — CLONIDINE HYDROCHLORIDE 0.1 MG/1
0.1 TABLET ORAL 2 TIMES DAILY
Status: DISCONTINUED | OUTPATIENT
Start: 2019-12-23 | End: 2019-12-24 | Stop reason: HOSPADM

## 2019-12-23 RX ORDER — FOLIC ACID 1 MG/1
1 TABLET ORAL DAILY
Status: DISCONTINUED | OUTPATIENT
Start: 2019-12-23 | End: 2019-12-24 | Stop reason: HOSPADM

## 2019-12-23 RX ORDER — LORAZEPAM 2 MG/ML
2 INJECTION INTRAMUSCULAR ONCE
Status: COMPLETED | OUTPATIENT
Start: 2019-12-23 | End: 2019-12-23

## 2019-12-23 RX ORDER — ONDANSETRON 4 MG/1
4 TABLET, ORALLY DISINTEGRATING ORAL ONCE
Status: COMPLETED | OUTPATIENT
Start: 2019-12-23 | End: 2019-12-23

## 2019-12-23 RX ORDER — TRAZODONE HYDROCHLORIDE 100 MG/1
200 TABLET ORAL
Status: DISCONTINUED | OUTPATIENT
Start: 2019-12-23 | End: 2019-12-24 | Stop reason: HOSPADM

## 2019-12-23 RX ORDER — VENLAFAXINE HYDROCHLORIDE 75 MG/1
225 CAPSULE, EXTENDED RELEASE ORAL DAILY
Status: DISCONTINUED | OUTPATIENT
Start: 2019-12-23 | End: 2019-12-24 | Stop reason: HOSPADM

## 2019-12-23 RX ORDER — GABAPENTIN 300 MG/1
600 CAPSULE ORAL
Status: DISCONTINUED | OUTPATIENT
Start: 2019-12-23 | End: 2019-12-24 | Stop reason: HOSPADM

## 2019-12-23 RX ORDER — CHLORDIAZEPOXIDE HYDROCHLORIDE 25 MG/1
25 CAPSULE, GELATIN COATED ORAL ONCE
Status: COMPLETED | OUTPATIENT
Start: 2019-12-23 | End: 2019-12-23

## 2019-12-23 RX ORDER — SUCRALFATE 1 G/1
1 TABLET ORAL
Status: DISCONTINUED | OUTPATIENT
Start: 2019-12-23 | End: 2019-12-24 | Stop reason: HOSPADM

## 2019-12-23 RX ORDER — ONDANSETRON 2 MG/ML
4 INJECTION INTRAMUSCULAR; INTRAVENOUS EVERY 6 HOURS PRN
Status: DISCONTINUED | OUTPATIENT
Start: 2019-12-23 | End: 2019-12-24 | Stop reason: HOSPADM

## 2019-12-23 RX ORDER — LAMOTRIGINE 25 MG/1
50 TABLET ORAL DAILY
Status: DISCONTINUED | OUTPATIENT
Start: 2019-12-23 | End: 2019-12-24 | Stop reason: HOSPADM

## 2019-12-23 RX ORDER — LISINOPRIL 20 MG/1
20 TABLET ORAL DAILY
Status: DISCONTINUED | OUTPATIENT
Start: 2019-12-23 | End: 2019-12-24 | Stop reason: HOSPADM

## 2019-12-23 RX ORDER — NICOTINE 21 MG/24HR
1 PATCH, TRANSDERMAL 24 HOURS TRANSDERMAL DAILY
Status: DISCONTINUED | OUTPATIENT
Start: 2019-12-23 | End: 2019-12-24 | Stop reason: HOSPADM

## 2019-12-23 RX ORDER — THIAMINE MONONITRATE (VIT B1) 100 MG
100 TABLET ORAL DAILY
Status: DISCONTINUED | OUTPATIENT
Start: 2019-12-23 | End: 2019-12-23

## 2019-12-23 RX ORDER — LORAZEPAM 1 MG/1
2 TABLET ORAL ONCE
Status: COMPLETED | OUTPATIENT
Start: 2019-12-23 | End: 2019-12-23

## 2019-12-23 RX ORDER — ONDANSETRON 2 MG/ML
4 INJECTION INTRAMUSCULAR; INTRAVENOUS ONCE
Status: COMPLETED | OUTPATIENT
Start: 2019-12-23 | End: 2019-12-23

## 2019-12-23 RX ORDER — DOXEPIN HYDROCHLORIDE 25 MG/1
50 CAPSULE ORAL
Status: DISCONTINUED | OUTPATIENT
Start: 2019-12-23 | End: 2019-12-24 | Stop reason: HOSPADM

## 2019-12-23 RX ORDER — LORAZEPAM 0.5 MG/1
1 TABLET ORAL ONCE
Status: COMPLETED | OUTPATIENT
Start: 2019-12-23 | End: 2019-12-23

## 2019-12-23 RX ORDER — LEVOTHYROXINE SODIUM 0.05 MG/1
50 TABLET ORAL
Status: DISCONTINUED | OUTPATIENT
Start: 2019-12-23 | End: 2019-12-24 | Stop reason: HOSPADM

## 2019-12-23 RX ORDER — DIAZEPAM 5 MG/1
5 TABLET ORAL ONCE
Status: COMPLETED | OUTPATIENT
Start: 2019-12-23 | End: 2019-12-23

## 2019-12-23 RX ORDER — FOLIC ACID 1 MG/1
1 TABLET ORAL DAILY
Status: DISCONTINUED | OUTPATIENT
Start: 2019-12-23 | End: 2019-12-23

## 2019-12-23 RX ORDER — GABAPENTIN 400 MG/1
400 CAPSULE ORAL 2 TIMES DAILY
Status: DISCONTINUED | OUTPATIENT
Start: 2019-12-23 | End: 2019-12-24 | Stop reason: HOSPADM

## 2019-12-23 RX ORDER — THIAMINE HYDROCHLORIDE 100 MG/ML
100 INJECTION, SOLUTION INTRAMUSCULAR; INTRAVENOUS ONCE
Status: COMPLETED | OUTPATIENT
Start: 2019-12-23 | End: 2019-12-23

## 2019-12-23 RX ORDER — SODIUM CHLORIDE, SODIUM LACTATE, POTASSIUM CHLORIDE, CALCIUM CHLORIDE 600; 310; 30; 20 MG/100ML; MG/100ML; MG/100ML; MG/100ML
125 INJECTION, SOLUTION INTRAVENOUS CONTINUOUS
Status: DISCONTINUED | OUTPATIENT
Start: 2019-12-23 | End: 2019-12-24

## 2019-12-23 RX ORDER — METOPROLOL TARTRATE 50 MG/1
50 TABLET, FILM COATED ORAL EVERY 12 HOURS SCHEDULED
Status: DISCONTINUED | OUTPATIENT
Start: 2019-12-23 | End: 2019-12-24 | Stop reason: HOSPADM

## 2019-12-23 RX ORDER — PANTOPRAZOLE SODIUM 40 MG/1
40 TABLET, DELAYED RELEASE ORAL
Status: DISCONTINUED | OUTPATIENT
Start: 2019-12-23 | End: 2019-12-24 | Stop reason: HOSPADM

## 2019-12-23 RX ORDER — THIAMINE MONONITRATE (VIT B1) 100 MG
100 TABLET ORAL DAILY
Status: DISCONTINUED | OUTPATIENT
Start: 2019-12-23 | End: 2019-12-24 | Stop reason: HOSPADM

## 2019-12-23 RX ADMIN — Medication 1 TABLET: at 11:51

## 2019-12-23 RX ADMIN — CHLORDIAZEPOXIDE HYDROCHLORIDE 25 MG: 25 CAPSULE ORAL at 13:52

## 2019-12-23 RX ADMIN — GABAPENTIN 400 MG: 400 CAPSULE ORAL at 11:51

## 2019-12-23 RX ADMIN — SODIUM CHLORIDE, SODIUM LACTATE, POTASSIUM CHLORIDE, AND CALCIUM CHLORIDE 125 ML/HR: .6; .31; .03; .02 INJECTION, SOLUTION INTRAVENOUS at 11:41

## 2019-12-23 RX ADMIN — ONDANSETRON 4 MG: 4 TABLET, ORALLY DISINTEGRATING ORAL at 07:17

## 2019-12-23 RX ADMIN — FOLIC ACID 1 MG: 1 TABLET ORAL at 11:52

## 2019-12-23 RX ADMIN — ONDANSETRON 4 MG: 2 INJECTION, SOLUTION INTRAMUSCULAR; INTRAVENOUS at 02:57

## 2019-12-23 RX ADMIN — LORAZEPAM 2 MG: 2 INJECTION INTRAMUSCULAR; INTRAVENOUS at 11:52

## 2019-12-23 RX ADMIN — LURASIDONE HYDROCHLORIDE 80 MG: 40 TABLET, FILM COATED ORAL at 15:40

## 2019-12-23 RX ADMIN — GABAPENTIN 400 MG: 400 CAPSULE ORAL at 17:44

## 2019-12-23 RX ADMIN — ENOXAPARIN SODIUM 40 MG: 40 INJECTION SUBCUTANEOUS at 11:52

## 2019-12-23 RX ADMIN — ONDANSETRON 4 MG: 4 TABLET, ORALLY DISINTEGRATING ORAL at 07:16

## 2019-12-23 RX ADMIN — LAMOTRIGINE 50 MG: 25 TABLET ORAL at 11:51

## 2019-12-23 RX ADMIN — TRAZODONE HYDROCHLORIDE 200 MG: 100 TABLET ORAL at 22:15

## 2019-12-23 RX ADMIN — LORAZEPAM 2 MG: 1 TABLET ORAL at 16:36

## 2019-12-23 RX ADMIN — SUCRALFATE 1 G: 1 TABLET ORAL at 22:15

## 2019-12-23 RX ADMIN — GABAPENTIN 600 MG: 300 CAPSULE ORAL at 22:15

## 2019-12-23 RX ADMIN — THIAMINE HYDROCHLORIDE 100 MG: 100 INJECTION, SOLUTION INTRAMUSCULAR; INTRAVENOUS at 10:33

## 2019-12-23 RX ADMIN — LORAZEPAM 1 MG: 0.5 TABLET ORAL at 06:46

## 2019-12-23 RX ADMIN — METOPROLOL TARTRATE 50 MG: 50 TABLET, FILM COATED ORAL at 11:52

## 2019-12-23 RX ADMIN — LEVOTHYROXINE SODIUM 50 MCG: 50 TABLET ORAL at 11:52

## 2019-12-23 RX ADMIN — CHLORDIAZEPOXIDE HYDROCHLORIDE 25 MG: 25 CAPSULE ORAL at 09:09

## 2019-12-23 RX ADMIN — SODIUM CHLORIDE, SODIUM LACTATE, POTASSIUM CHLORIDE, AND CALCIUM CHLORIDE 125 ML/HR: .6; .31; .03; .02 INJECTION, SOLUTION INTRAVENOUS at 20:57

## 2019-12-23 RX ADMIN — CLONIDINE HYDROCHLORIDE 0.1 MG: 0.1 TABLET ORAL at 17:44

## 2019-12-23 RX ADMIN — NICOTINE 1 PATCH: 21 PATCH, EXTENDED RELEASE TRANSDERMAL at 11:51

## 2019-12-23 RX ADMIN — METOPROLOL TARTRATE 50 MG: 50 TABLET, FILM COATED ORAL at 20:54

## 2019-12-23 RX ADMIN — CHLORDIAZEPOXIDE HYDROCHLORIDE 25 MG: 25 CAPSULE ORAL at 22:15

## 2019-12-23 RX ADMIN — DIAZEPAM 5 MG: 5 TABLET ORAL at 02:21

## 2019-12-23 RX ADMIN — LISINOPRIL 20 MG: 20 TABLET ORAL at 11:52

## 2019-12-23 RX ADMIN — PANTOPRAZOLE SODIUM 40 MG: 40 TABLET, DELAYED RELEASE ORAL at 11:58

## 2019-12-23 RX ADMIN — THIAMINE HCL TAB 100 MG 100 MG: 100 TAB at 11:51

## 2019-12-23 RX ADMIN — LORAZEPAM 2 MG: 2 INJECTION INTRAMUSCULAR; INTRAVENOUS at 09:09

## 2019-12-23 RX ADMIN — SUCRALFATE 1 G: 1 TABLET ORAL at 15:40

## 2019-12-23 RX ADMIN — VENLAFAXINE HYDROCHLORIDE 225 MG: 75 CAPSULE, EXTENDED RELEASE ORAL at 11:51

## 2019-12-23 RX ADMIN — CLONIDINE HYDROCHLORIDE 0.1 MG: 0.1 TABLET ORAL at 11:51

## 2019-12-23 RX ADMIN — SUCRALFATE 1 G: 1 TABLET ORAL at 11:51

## 2019-12-23 RX ADMIN — DOXEPIN HYDROCHLORIDE 50 MG: 25 CAPSULE ORAL at 22:16

## 2019-12-23 NOTE — H&P
H&P- Yumi Canales 1965, 48 y o  female MRN: [id number]    Unit/Bed#:  Encounter: [id number]    Primary Care Provider: YONG Avila   Date and time admitted to hospital: 12/22/2019  5:52 PM        Leukocytosis  Assessment & Plan  · Suspect reactive there is no fever and no obvious source of infection  Repeat CBC tomorrow  No indication for antibiotics  Lactic acidosis  Assessment & Plan  · Secondary to alcohol use  Improved  Alcohol withdrawal syndrome without complication [facility])  Assessment & Plan  · The patient is going through withdrawals  Her CIWA level actually today in the morning was 27 and that is when she would could not be admitted to Psychiatry  She was given Ativan 2 mg on Librium dose and it improved  Will admit to medical level to step-down he patient has mild tremors she is sinus tachycardic I think she under place of how much she drinks she states she drinks 3 beers yesterday and her alcohol level was 315  She states she drinks twice a week at but suspect she drinks more than that  She was just recently admitted for same exact thing alcohol intoxication with intentional overdose  I will continue her on CIWA and add Librium 25 mg p o  Q 8 hours  Monitor for any adjustments  Continue thiamine folic acid and multivitamin  Her episode of diarrhea and vomiting is secondary to her withdrawal and intoxication patient has not eaten anything but drink  Will provide Zofran p r n  Start her on clears and advance as tolerated  I did review EKG QTC is 460  Place her on  mL per hour  Intentional acetaminophen overdose ([address] )  Assessment & Plan  · Patient stated she took a handful of Tylenol p m  Last night her Tylenol level was negative x2  No indication for NAC, patient will be on one-to-one with consultation to Psychiatry  She already signed a 201  She was recently just discharged for exactly the same thing      Transaminitis  Assessment & Plan  · Mild -> suspect secondary to alcoholic hepatitis  Her Tylenol level x2 were negative  Had history of is well    Generalized anxiety disorder  Assessment & Plan  · Continue psychiatric medications    Tobacco abuse  Assessment & Plan  · Has been counseled on nicotine patch has been provided  Bipolar I disorder, most recent episode depressed, severe without psychotic features [facility])  Assessment & Plan  · Resume her recently prescribed all psychiatric medication she has been taking it as prescribed  Consultation to Psychiatry done patient has a 201  Gastritis  Assessment & Plan  · Continue PPI, Carafate    Acquired hypothyroidism  Assessment & Plan  · tsh controlled   continue her Synthroid    Essential hypertension  Assessment & Plan  · Uncontrolled resume her Norvasc resume her metoprolol resume her clonidine  VTE Prophylaxis: Enoxaparin (Lovenox)  / sequential compression device   Code Status:  Full code  POLST: There is no POLST form on file for this patient (pre-hospital)  Discussion with family:  Patient    Anticipated Length of Stay:  Patient will be admitted on an Inpatient basis with an anticipated length of stay of  > 2 midnights  Justification for Hospital Stay:  Alcohol withdrawal    Total Time for Visit, including Counseling / Coordination of Care: 1 hour  Greater than 50% of this total time spent on direct patient counseling and coordination of care  Chief Complaint:   Intentional overdose    History of Present Illness:    Mitchell Jacobs is a 48 y o  female who presents with patient was brought in by her son because she was not feeling well yesterday she had took a handful of Tylenol p m  In trying to kill herself because she has depression she also drank 3 beers she has not been eating for the time being as she was discharged from the hospital but she actually was taking her medications    Patient states today she woke up in the hospital and she vomited 3 times she had diarrhea watery 3 times   She still feels some nausea and some epigastric pain  Denies any chest pain or shortness of breath  Patient states she drinks twice a week  She denies any drug use but does smoke cigarettes  Denies any coughing  She is positive for generalized weakness and some lightheaded dizziness       Review of Systems:    Review of Systems   Constitutional: Negative for fatigue and fever  HENT: Negative  Negative for ear pain, rhinorrhea and sore throat  Eyes: Negative  Negative for pain and itching  Respiratory: Negative  Negative for cough, chest tightness, shortness of breath and wheezing  Cardiovascular: Negative  Negative for chest pain, palpitations and leg swelling  Gastrointestinal: Positive for abdominal pain, diarrhea, nausea and vomiting  Endocrine: Negative for polydipsia, polyphagia and polyuria  Genitourinary: Negative for dysuria and flank pain  Musculoskeletal: Negative  Negative for back pain and myalgias  Skin: Negative  Negative for rash and wound  Neurological: Negative  Negative for dizziness, syncope, speech difficulty, weakness, light-headedness, numbness and headaches  Psychiatric/Behavioral: Positive for self-injury  Negative for agitation, confusion and decreased concentration  All other systems reviewed and are negative        Past Medical and Surgical History:     Past Medical History:   Diagnosis Date    Alcoholism ([facility] )     Anxiety     Bipolar disorder (HCC)     Bowel obstruction (HCC)     Gastritis     Head injury     Heart palpitations     Hyperlipidemia     Hypertension     Hypothyroidism     PTSD (post-traumatic stress disorder)     Seizure ([facility] )     Suicide attempt [facility])        Past Surgical History:   Procedure Laterality Date    ABDOMINAL SURGERY      APPENDECTOMY      BOWEL RESECTION      CHOLECYSTECTOMY      ESOPHAGOGASTRODUODENOSCOPY N/A 5/18/2018    Procedure: ESOPHAGOGASTRODUODENOSCOPY (EGD) with bx;  Surgeon: Edilia Ramsey DO Brett;  Location: [facility]; Service: Gastroenterology    HYSTERECTOMY      KNEE SURGERY Bilateral        Meds/Allergies:    Prior to Admission medications    Medication Sig Start Date End Date Taking?  Authorizing Provider   cloNIDine (CATAPRES) 0 1 mg tablet Take 1 tablet (0 1 mg total) by mouth 2 (two) times a day 11/19/19   YONG Davis   doxepin (SINEquan) 50 mg capsule Take 1 capsule (50 mg total) by mouth daily at bedtime 12/18/19   Endy Fong MD   folic acid (FOLVITE) 1 mg tablet Take 1 tablet (1 mg total) by mouth daily 11/19/19   YONG Davis   gabapentin (NEURONTIN) 300 mg capsule Take 2 capsules (600 mg total) by mouth daily at bedtime 12/18/19   Endy Fong MD   gabapentin (NEURONTIN) 400 mg capsule Take 1 capsule (400 mg total) by mouth 2 (two) times a day 12/18/19   Endy Fong MD   lamoTRIgine (LaMICtal) 25 mg tablet Take 2 tablets (50 mg total) by mouth daily 12/19/19   Endy Fong MD   levothyroxine 50 mcg tablet Take 1 tablet (50 mcg total) by mouth daily 11/19/19   YONG Davis   lisinopril (ZESTRIL) 20 mg tablet Take 1 tablet (20 mg total) by mouth daily 11/19/19   YONG Davis   lurasidone (LATUDA) 80 mg tablet Take 1 tablet (80 mg total) by mouth daily with dinner 12/18/19   Endy Fong MD   metoprolol tartrate (LOPRESSOR) 50 mg tablet Take 1 tablet (50 mg total) by mouth every 12 (twelve) hours 11/19/19   YONG Davis   pantoprazole (PROTONIX) 40 mg tablet Take 1 tablet (40 mg total) by mouth daily in the early morning 11/19/19   YONG Davis   sucralfate (CARAFATE) 1 g tablet Take 1 tablet (1 g total) by mouth 4 (four) times a day (before meals and at bedtime) 11/19/19   YONG Davis   thiamine 100 MG tablet Take 1 tablet (100 mg total) by mouth daily 12/18/19   Endy Fong MD   traZODone (DESYREL) 100 mg tablet Take 2 tablets (200 mg total) by mouth daily at bedtime 12/18/19 Gilford Downs, MD   venlafaxine [facility] ) 75 mg 24 hr capsule Take 3 capsules (225 mg total) by mouth daily 12/19/19   Gilford Downs, MD     I have reviewed home medications with a medical source (PCP, Pharmacy, other)  Allergies: Allergies   Allergen Reactions    Latex Rash       Social History:     Marital Status:    Substance Use History:   Social History     Substance and Sexual Activity   Alcohol Use Yes    Alcohol/week: 21 0 standard drinks    Types: 21 Cans of beer per week    Frequency: 2-3 times a week    Drinks per session: 3 or 4    Binge frequency: Weekly    Comment: As of 11/14, reports 7 large beers about 2-3 times a week  Social History     Tobacco Use   Smoking Status Current Every Day Smoker    Packs/day: 1 00    Years: 25 00    Pack years: 25 00    Types: Cigarettes   Smokeless Tobacco Never Used     Social History     Substance and Sexual Activity   Drug Use No       Family History:    Family History   Problem Relation Age of Onset    Diabetes Father        Physical Exam:     Vitals:   Blood Pressure: 163/91 (12/23/19 1100)  Pulse: (!) 109 (12/23/19 1100)  Temperature: 98 5 °F (36 9 °C) (12/23/19 1100)  Temp Source: Temporal (12/23/19 1100)  Respirations: 15 (12/23/19 1100)  Height: 5' 3" (160 cm) (12/23/19 1100)  Weight - Scale: 87 1 kg (192 lb 0 3 oz) (12/22/19 1746)  SpO2: 97 % (12/23/19 1100)    Physical Exam   Constitutional: She is oriented to person, place, and time  She appears well-developed and well-nourished  HENT:   Head: Normocephalic and atraumatic  Eyes: Pupils are equal, round, and reactive to light  EOM are normal    Neck: Normal range of motion  Cardiovascular: Normal heart sounds  Tachycardia present  Pulmonary/Chest: Effort normal and breath sounds normal  No stridor  No respiratory distress  She has no wheezes  Abdominal: Soft  Bowel sounds are normal  There is tenderness (epigastric)  Musculoskeletal: Normal range of motion  Neurological: She is alert and oriented to person, place, and time  She has normal reflexes  cranial nerve 2-12 are normal   Normal neurological exam mild tremors of bilateral hands   Skin: Skin is warm  Psychiatric: She has a normal mood and affect  Additional Data:     Lab Results: I have personally reviewed pertinent films in PACS    Results from last 7 days   Lab Units 12/22/19  1812   WBC Thousand/uL 15 60*   HEMOGLOBIN g/dL 14 0   HEMATOCRIT % 40 7   PLATELETS Thousands/uL 503*   BANDS PCT % 1   LYMPHO PCT % 25   MONO PCT % 9     Results from last 7 days   Lab Units 12/22/19  2222   SODIUM mmol/L 141   POTASSIUM mmol/L 4 5   CHLORIDE mmol/L 109*   CO2 mmol/L 22   BUN mg/dL 8   CREATININE mg/dL 0 60   ANION GAP mmol/L 10   CALCIUM mg/dL 7 8*   ALBUMIN g/dL 3 4   TOTAL BILIRUBIN mg/dL 0 60   ALK PHOS U/L 52   ALT U/L 53*   AST U/L 38*   GLUCOSE RANDOM mg/dL 174*     Results from last 7 days   Lab Units 12/22/19  2222   INR  0 92             Results from last 7 days   Lab Units 12/22/19  2222 12/22/19  1812   LACTIC ACID mmol/L 2 8* 5 5*       Imaging:  None available    No orders to display       EKG, Pathology, and Other Studies Reviewed on Admission:   · EKG:  Reviewed    [facility]     ** Please Note: This note has been constructed using a voice recognition system   **

## 2019-12-23 NOTE — ED NOTES
Patient urinated on bed  Clean clothing and linen provided  Patient agreed to wearing brief         Jayshree Meals  12/23/19 3998

## 2019-12-23 NOTE — ED NOTES
Patient presented in the ED at [phone]  She was actively intoxicated at the time of arrival, but was reporting that she was suicidal and ingested a handfull of tylenol PM  to kill herself  Patient was interviewed this AM, (her alcohol level was below   08 at time of admission)  Patient presents as very depressed, withdrawn and anxious  She reports that she very unmotivated, feels sad and lonely  She does not live along - with adult son,  but is not involved in any activities  She also did not get to her outpt provider yet  She denies any psychosis  She also minimizes her alcohol use (I only had 6 beers yesterday)    Patient is asking for inpatient admission and signed a 201      patient has [facility] inpatient admissions this year, the last admission was at 12/12/ to 12/19/19 at [address] [place] [address]

## 2019-12-23 NOTE — PROGRESS NOTES
Discussed with nursing only got 2 mg on arrival to floor- last ciwa 10 patient is sleeping hr 84-> transfer to m/s telemetry level

## 2019-12-23 NOTE — ASSESSMENT & PLAN NOTE
· Mild -> suspect secondary to alcoholic hepatitis  Her Tylenol level x2 were negative    Had history of is well

## 2019-12-23 NOTE — ED PROVIDER NOTES
History  Chief Complaint   Patient presents with    Overdose - Intentional     states took tylenol PM and drinking; states wants to kill self  Just want to die ; States took a lot of medications; states doesn't know why I want to die  HPI    Prior to Admission Medications   Prescriptions Last Dose Informant Patient Reported? Taking?    cloNIDine (CATAPRES) 0 1 mg tablet   No No   Sig: Take 1 tablet (0 1 mg total) by mouth 2 (two) times a day   doxepin (SINEquan) 50 mg capsule   No No   Sig: Take 1 capsule (50 mg total) by mouth daily at bedtime   folic acid (FOLVITE) 1 mg tablet   No No   Sig: Take 1 tablet (1 mg total) by mouth daily   gabapentin (NEURONTIN) 300 mg capsule   No No   Sig: Take 2 capsules (600 mg total) by mouth daily at bedtime   gabapentin (NEURONTIN) 400 mg capsule   No No   Sig: Take 1 capsule (400 mg total) by mouth 2 (two) times a day   lamoTRIgine (LaMICtal) 25 mg tablet   No No   Sig: Take 2 tablets (50 mg total) by mouth daily   levothyroxine 50 mcg tablet   No No   Sig: Take 1 tablet (50 mcg total) by mouth daily   lisinopril (ZESTRIL) 20 mg tablet   No No   Sig: Take 1 tablet (20 mg total) by mouth daily   lurasidone (LATUDA) 80 mg tablet   No No   Sig: Take 1 tablet (80 mg total) by mouth daily with dinner   metoprolol tartrate (LOPRESSOR) 50 mg tablet   No No   Sig: Take 1 tablet (50 mg total) by mouth every 12 (twelve) hours   pantoprazole (PROTONIX) 40 mg tablet   No No   Sig: Take 1 tablet (40 mg total) by mouth daily in the early morning   sucralfate (CARAFATE) 1 g tablet   No No   Sig: Take 1 tablet (1 g total) by mouth 4 (four) times a day (before meals and at bedtime)   thiamine 100 MG tablet   No No   Sig: Take 1 tablet (100 mg total) by mouth daily   traZODone (DESYREL) 100 mg tablet   No No   Sig: Take 2 tablets (200 mg total) by mouth daily at bedtime   venlafaxine (EFFEXOR-XR) 75 mg 24 hr capsule   No No   Sig: Take 3 capsules (225 mg total) by mouth daily Facility-Administered Medications: None       Past Medical History:   Diagnosis Date    Alcoholism ([facility] )     Anxiety     Bipolar disorder (HCC)     Bowel obstruction (HCC)     Gastritis     Head injury     Heart palpitations     Hyperlipidemia     Hypertension     Hypothyroidism     PTSD (post-traumatic stress disorder)     Seizure ([facility] [address] )     Suicide attempt [facility])        Past Surgical History:   Procedure Laterality Date    ABDOMINAL SURGERY      APPENDECTOMY      BOWEL RESECTION      CHOLECYSTECTOMY      ESOPHAGOGASTRODUODENOSCOPY N/A 5/18/2018    Procedure: ESOPHAGOGASTRODUODENOSCOPY (EGD) with bx;  Surgeon: Fransico Wilson DO;  Location: [facility]; Service: Gastroenterology    HYSTERECTOMY      KNEE SURGERY Bilateral        Family History   Problem Relation Age of Onset    Diabetes Father      I have reviewed and agree with the history as documented  Social History     Tobacco Use    Smoking status: Current Every Day Smoker     Packs/day: 1 00     Years: 25 00     Pack years: 25 00     Types: Cigarettes    Smokeless tobacco: Never Used   Substance Use Topics    Alcohol use: Yes     Alcohol/week: 21 0 standard drinks     Types: 21 Cans of beer per week     Frequency: 2-3 times a week     Drinks per session: 3 or 4     Binge frequency: Weekly     Comment: As of 11/14, reports 7 large beers about 2-3 times a week      Drug use: No        Review of Systems    Physical Exam  Physical Exam    Vital Signs  ED Triage Vitals   Temperature Pulse Respirations Blood Pressure SpO2   12/22/19 1746 12/22/19 1746 12/22/19 1746 12/22/19 1746 12/22/19 1746   98 6 °F (37 °C) (!) 175 (!) 28 (!) 189/113 97 %      Temp Source Heart Rate Source Patient Position - Orthostatic VS BP Location FiO2 (%)   12/22/19 1746 12/22/19 1746 12/22/19 1746 12/22/19 1829 --   Tympanic Monitor Lying Left arm       Pain Score       12/22/19 1746       No Pain           Vitals:    12/22/19 1838 12/22/19 2219 12/22/19 2300 12/22/19 2342   BP: 155/90 147/85 147/85    Pulse: (!) 119 (!) 111 (!) 111 104   Patient Position - Orthostatic VS:  Lying           Visual Acuity      ED Medications  Medications   nicotine (NICODERM CQ) 21 mg/24 hr TD 24 hr patch 21 mg (21 mg Transdermal Medication Applied 12/22/19 2040)   diazepam (VALIUM) tablet 5 mg (has no administration in time range)   sodium chloride 0 9 % bolus 1,000 mL (0 mL Intravenous Stopped 12/22/19 1937)   LORazepam (ATIVAN) 2 mg/mL injection 1 mg (1 mg Intravenous Given 12/22/19 1828)   sodium chloride 0 9 % bolus 1,000 mL (0 mL Intravenous Stopped 12/22/19 2212)   sodium chloride 0 9 % bolus 1,000 mL (0 mL Intravenous Stopped 12/23/19 0055)   LORazepam (ATIVAN) tablet 1 mg (1 mg Oral Given 12/22/19 2057)   diazepam (VALIUM) tablet 5 mg (5 mg Oral Given 12/22/19 2328)       Diagnostic Studies  Results Reviewed     Procedure Component Value Units Date/Time    Salicylate level [[id number]]  (Abnormal) Collected:  12/22/19 2222    Lab Status:  Final result Specimen:  Blood from Arm, Right Updated:  45/02/72 7912     Salicylate Lvl <7 3 mg/dL     Narrative:       Hemolysis    Acetaminophen level-If concentration is detectable, please discuss with medical  on call   [[id number]]  (Abnormal) Collected:  12/22/19 2222    Lab Status:  Final result Specimen:  Blood from Arm, Right Updated:  12/22/19 2248     Acetaminophen Level <10 ug/mL     Narrative:       Hemolysis    Comprehensive metabolic panel [[id number]]  (Abnormal) Collected:  12/22/19 2222    Lab Status:  Final result Specimen:  Blood from Arm, Right Updated:  12/22/19 2248     Sodium 141 mmol/L      Potassium 4 5 mmol/L      Chloride 109 mmol/L      CO2 22 mmol/L      ANION GAP 10 mmol/L      BUN 8 mg/dL      Creatinine 0 60 mg/dL      Glucose 174 mg/dL      Calcium 7 8 mg/dL      AST 38 U/L      ALT 53 U/L      Alkaline Phosphatase 52 U/L      Total Protein 6 1 g/dL      Albumin 3 4 g/dL      Total Bilirubin 0 60 mg/dL eGFR 105 ml/min/1 73sq m     Narrative:       Hemolysis  [employer] guidelines for Chronic Kidney Disease (CKD):     Stage 1 with normal or high GFR (GFR > 90 mL/min/1 73 square meters)    Stage 2 Mild CKD (GFR = 60-89 mL/min/1 73 square meters)    Stage 3A Moderate CKD (GFR = 45-59 mL/min/1 73 square meters)    Stage 3B Moderate CKD (GFR = 30-44 mL/min/1 73 square meters)    Stage 4 Severe CKD (GFR = 15-29 mL/min/1 73 square meters)    Stage 5 End Stage CKD (GFR <15 mL/min/1 73 square meters)  Note: GFR calculation is accurate only with a steady state creatinine    Ethanol [[id number]]  (Abnormal) Collected:  12/22/19 2222    Lab Status:  Final result Specimen:  Blood from Arm, Right Updated:  12/22/19 2246     Ethanol Lvl 223 mg/dL     Protime-INR [[id number]]  (Normal) Collected:  12/22/19 2222    Lab Status:  Final result Specimen:  Blood from Arm, Right Updated:  12/22/19 2243     Protime 10 2 seconds      INR 0 92    APTT [[id number]]  (Normal) Collected:  12/22/19 2222    Lab Status:  Final result Specimen:  Blood from Arm, Right Updated:  12/22/19 2243     PTT 26 seconds     Lactic acid, plasma [[id number]]  (Abnormal) Collected:  12/22/19 2222    Lab Status:  Final result Specimen:  Blood from Arm, Right Updated:  12/22/19 2241     LACTIC ACID 2 8 mmol/L     Narrative:       Result may be elevated if tourniquet was used during collection  Rapid drug screen, urine [[id number]]  (Normal) Collected:  12/22/19 1831    Lab Status:  Final result Specimen:  Urine, Clean Catch Updated:  12/22/19 1916     Amph/Meth UR Negative     Barbiturate Ur Negative     Benzodiazepine Urine Negative     Cocaine Urine Negative     Methadone Urine Negative     Opiate Urine Negative     PCP Ur Negative     THC Urine Negative    Narrative:       FOR MEDICAL PURPOSES ONLY  IF CONFIRMATION NEEDED PLEASE CONTACT THE LAB WITHIN 5 DAYS      Drug Screen Cutoff Levels:  AMPHETAMINE/METHAMPHETAMINES  1000 ng/mL  BARBITURATES     200 ng/mL  BENZODIAZEPINES     200 ng/mL  COCAINE      300 ng/mL  METHADONE      300 ng/mL  OPIATES      300 ng/mL  PHENCYCLIDINE     25 ng/mL  THC       50 ng/mL      TSH [[id number]]  (Normal) Collected:  12/22/19 1812    Lab Status:  Final result Specimen:  Blood from Arm, Right Updated:  12/22/19 1916     TSH 3RD GENERATON 3 380 uIU/mL     Narrative:       Patients undergoing fluorescein dye angiography may retain small amounts of fluorescein in the body for 48-72 hours post procedure  Samples containing fluorescein can produce falsely depressed TSH values  If the patient had this procedure,a specimen should be resubmitted post fluorescein clearance        UA (URINE) with reflex to Scope [[id number]]  (Normal) Collected:  12/22/19 1831    Lab Status:  Final result Specimen:  Urine, Clean Catch Updated:  12/22/19 1857     Color, UA Straw     Clarity, UA Clear     Specific Gravity, UA 1 010     pH, UA 6 0     Leukocytes, UA Negative     Nitrite, UA Negative     Protein, UA Negative mg/dl      Glucose, UA Negative mg/dl      Ketones, UA Negative mg/dl      Bilirubin, UA Negative     Blood, UA Negative     UROBILINOGEN UA Negative mg/dL     CKMB [[id number]]  (Abnormal) Collected:  12/22/19 1812    Lab Status:  Final result Specimen:  Blood from Arm, Right Updated:  12/22/19 1857     CK-MB Index 1 1 %      CK-MB 2 9 ng/mL     Troponin I [[id number]]  (Normal) Collected:  12/22/19 1812    Lab Status:  Final result Specimen:  Blood from Arm, Right Updated:  12/22/19 1848     Troponin I 0 02 ng/mL     APTT [[id number]]  (Normal) Collected:  12/22/19 1812    Lab Status:  Final result Specimen:  Blood from Arm, Right Updated:  12/22/19 1848     PTT 26 seconds     Protime-INR [[id number]]  (Abnormal) Collected:  12/22/19 1812    Lab Status:  Final result Specimen:  Blood from Arm, Right Updated:  12/22/19 1848     Protime 9 6 seconds      INR 0 87    Ethanol [[id number]]  (Abnormal) Collected:  12/22/19 1812    Lab Status:  Final result Specimen:  Blood from Arm, Right Updated:  12/22/19 1845     Ethanol Lvl 315 mg/dL     Lactic acid, plasma x2 [[id number]]  (Abnormal) Collected:  12/22/19 1812    Lab Status:  Final result Specimen:  Blood from Arm, Right Updated:  12/22/19 1840     LACTIC ACID 5 5 mmol/L     Narrative:       Result may be elevated if tourniquet was used during collection  CBC and differential [[id number]]  (Abnormal) Collected:  12/22/19 1812    Lab Status:  Final result Specimen:  Blood from Arm, Right Updated:  12/22/19 1838     WBC 15 60 Thousand/uL      RBC 4 21 Million/uL      Hemoglobin 14 0 g/dL      Hematocrit 40 7 %      MCV 97 fL      MCH 33 3 pg      MCHC 34 4 g/dL      RDW 13 9 %      MPV 7 2 fL      Platelets 673 Thousands/uL     Salicylate level [[id number]]  (Abnormal) Collected:  12/22/19 1812    Lab Status:  Final result Specimen:  Blood from Arm, Right Updated:  48/71/85 2100     Salicylate Lvl <1 6 mg/dL     Magnesium [[id number]]  (Abnormal) Collected:  12/22/19 1812    Lab Status:  Final result Specimen:  Blood from Arm, Right Updated:  12/22/19 1838     Magnesium 2 6 mg/dL     CK Total with Reflex CKMB [[id number]]  (Abnormal) Collected:  12/22/19 1812    Lab Status:  Final result Specimen:  Blood from Arm, Right Updated:  12/22/19 1837     Total  U/L     Acetaminophen level-If concentration is detectable, please discuss with medical  on call   [[id number]]  (Abnormal) Collected:  12/22/19 1812    Lab Status:  Final result Specimen:  Blood from Arm, Right Updated:  12/22/19 1837     Acetaminophen Level <10 ug/mL     Comprehensive metabolic panel [[id number]]  (Abnormal) Collected:  12/22/19 1812    Lab Status:  Final result Specimen:  Blood from Arm, Right Updated:  12/22/19 1837     Sodium 142 mmol/L      Potassium 4 0 mmol/L      Chloride 103 mmol/L      CO2 21 mmol/L      ANION GAP 18 mmol/L      BUN 9 mg/dL      Creatinine 0 70 mg/dL      Glucose 162 mg/dL Calcium 9 4 mg/dL      AST 41 U/L      ALT 64 U/L      Alkaline Phosphatase 86 U/L      Total Protein 8 3 g/dL      Albumin 4 7 g/dL      Total Bilirubin 0 60 mg/dL      eGFR 100 ml/min/1 73sq m     Narrative:       [employer] guidelines for Chronic Kidney Disease (CKD):     Stage 1 with normal or high GFR (GFR > 90 mL/min/1 73 square meters)    Stage 2 Mild CKD (GFR = 60-89 mL/min/1 73 square meters)    Stage 3A Moderate CKD (GFR = 45-59 mL/min/1 73 square meters)    Stage 3B Moderate CKD (GFR = 30-44 mL/min/1 73 square meters)    Stage 4 Severe CKD (GFR = 15-29 mL/min/1 73 square meters)    Stage 5 End Stage CKD (GFR <15 mL/min/1 73 square meters)  Note: GFR calculation is accurate only with a steady state creatinine    Blood gas, venous [[id number]]  (Abnormal) Collected:  12/22/19 1812    Lab Status:  Final result Specimen:  Blood from Arm, Right Updated:  12/22/19 1831     pH, Andrzej 7 470     pCO2, Andrzej 32 0 mm Hg      pO2, Andrzej 50 0 mm Hg      HCO3, Andrzej 23 3 mmol/L      Base Excess, Andrzej 0 4 mmol/L      O2 Content, Andrzej 17 0 ml/dL      O2 HGB, VENOUS 82 9 %     Lactic acid, plasma x2 [[id number]]     Lab Status:  No result Specimen:  Blood                  No orders to display              Procedures  Procedures         ED Course  ED Course as of Dec 26 0751   Mon Dec 23, 2019   [address]): [address]                               MDM      Disposition  Final diagnoses:   Alcohol intoxication ([facility] )   Alcohol abuse   Depression   Altered mental status   Dehydration   HTN (hypertension)     Time reflects when diagnosis was documented in both MDM as applicable and the Disposition within this note     Time User Action Codes Description Comment    12/22/2019  9:10 PM Mikhail Parra Add [F10 929] Alcohol intoxication ([facility] [address] )     12/22/2019  9:10 PM Lucia Parra Add [F10 10] Alcohol abuse     12/22/2019  9:10 PM Candjuli Gerard Add [F32 9] Depression     12/22/2019  9:10 PM Alanis Granger Add [R41 82] Altered mental status     12/22/2019  9:10 PM Cristhian Parra Add [E86 0] Dehydration     12/22/2019  9:10 PM Trinity Parra Add [I10] HTN (hypertension)       ED Disposition     None      Follow-up Information    None         Patient's Medications   Discharge Prescriptions    No medications on file     No discharge procedures on file      ED Provider  Electronically Signed by           Jimmie Shafer MD  12/26/19 0746

## 2019-12-23 NOTE — ED NOTES
Insurance Authorization for admission:   Phone call placed to **  Phone number: **      Spoke to **      ** days approved  Level of care: **   Review on **  Authorization # **         EVS (Eligibility Verification System) called - [phone]  Automated system indicates: Patient is eligible with both physical and behavioral managed by fee for service  Patient has medicare A & B  Patient's recipient number is [id number]  Insurance Authorization for Transportation:    Phone call placed to **  Phone number **  Spoke to **     Authorization #: **

## 2019-12-23 NOTE — NURSING NOTE
Report received from Anatoly Tolentino RN  Patient received  Asleep but arousable and oriented to person, place, and time  Denies any pain or shortness of breath  Vital signs show elevated blood pressure and telemetry indicates sinus rhythm  No change from initial assessment  Call bell in reach  Will continue to monitor  Nara Hernandes

## 2019-12-23 NOTE — ASSESSMENT & PLAN NOTE
· Patient stated she took a handful of Tylenol p m  Last night her Tylenol level was negative x2  No indication for NAC, patient will be on one-to-one with consultation to Psychiatry  She already signed a 201  She was recently just discharged for exactly the same thing

## 2019-12-23 NOTE — ASSESSMENT & PLAN NOTE
· Resume her recently prescribed all psychiatric medication she has been taking it as prescribed  Consultation to Psychiatry done patient has a 201
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
## 2019-12-23 NOTE — NURSING NOTE
Pt received into room 407 without problem, pt walked off of stretcher into room, gait steady, alert and oriented, 1:1 at bedside for suicidal precautions, "My son isn't paying the mortgage"  Pt asking for something for her stomach nausea and anxiety, ST on monitor, will monitor pt

## 2019-12-23 NOTE — NURSING NOTE
Pt transferred up to 707 with 1:1 at bedside, when awake,  Pt asks for something for anxiety but then when left undisturbed falls back asleep, snoring

## 2019-12-23 NOTE — NURSING NOTE
Pt found ambulating in hallway, "I just want to go for a walk"  Pt informed she can go home tomorrow morning, "okay", pt ambulated back to room

## 2019-12-23 NOTE — ED NOTES
Pt provided warm blankets and resting now  Side rails up, pt remains on 1:1  Pt has no further needs at this time        Brayan Almodovar RN  12/22/19 2102

## 2019-12-23 NOTE — ASSESSMENT & PLAN NOTE
· Suspect reactive there is no fever and no obvious source of infection  Repeat CBC tomorrow  No indication for antibiotics

## 2019-12-23 NOTE — ED CARE HANDOFF
Emergency Department Sign Out Note        Sign out and transfer of care from Dr Fernando Heredia  See Separate Emergency Department note  The patient, Melisa Varner, was evaluated by the previous provider for depression, intoxication  Workup Completed:  Medical clearance    ED Course / Workup Pending (followup):  PES eval after sober  Procedures  MDM    Disposition  Final diagnoses:   Alcohol intoxication ([facility] [address] )   Alcohol abuse   Depression   Altered mental status   Dehydration   HTN (hypertension)     Time reflects when diagnosis was documented in both MDM as applicable and the Disposition within this note     Time User Action Codes Description Comment    12/22/2019  9:10 PM BendTrista hickman  Add [F10 929] Alcohol intoxication ([facility] [address] )     12/22/2019  9:10 PM Bendock, Maggie Gains L Add [F10 10] Alcohol abuse     12/22/2019  9:10 PM Lazara Berrios Add [F32 9] Depression     12/22/2019  9:10 PM Bendock, Lucia L Add [R41 82] Altered mental status     12/22/2019  9:10 PM BendTrista hickman  Add [E86 0] Dehydration     12/22/2019  9:10 PM Bendock Maggie Gains L Add [I10] HTN (hypertension)       ED Disposition     None      Follow-up Information    None       Patient's Medications   Discharge Prescriptions    No medications on file     No discharge procedures on file         ED Provider  Electronically Signed by     Charisse Medrano MD  12/23/19 9814

## 2019-12-23 NOTE — ED NOTES
Patient out of bed on toilet, requesting meds for anxiety  Patient irwin, continues with dry heaves  IV restarted and patient medicated with Ativan and Librium for same  Patient back in bed, asking to walk around  Patient told she has to remain in bed after having Ativan       Serge Jaramillo RN  12/23/19 0110

## 2019-12-23 NOTE — SOCIAL WORK
CM met with pt to introduce CM role and begin discharge planning  Pt lives with her son, Kendra Montaño in a 3 story house that has 4 ZAIRA, bedroom and bathroom on first floor  Pt's emergency contacts are her father, Eamon Navarro ([phone]) and son, Zak Fairbanks ([phone])  There is no designated POA  Pt reports being independent with ADLs PTA, no use of DMEs  Pt reports no history of VNA, history of STR at [facility], recent history of inpatient [address] treatment and D/A treatment  Pt was not forthcoming with information regarding her inpatient stays  Pt drives and receives SSD benefits  PCP is Dr Brigitte Alonso ([phone]) and pt uses [facility] in [facility] for prescriptions  IMM was explained to pt and she gave verbal understanding  Signed IMM placed in medical records bin to be scanned into Epic chart  CM reviewed d/c planning process including the following: identifying help at home, patient preference for d/c planning needs, Discharge Lounge, [facility] to [facility] program, availability of treatment team to discuss questions or concerns patient and/or family may have regarding understanding medications and recognizing signs and symptoms once discharged  CM also encouraged patient to follow up with all recommended appointments after discharge  Patient advised of importance for patient and family to participate in managing patients medical well being

## 2019-12-23 NOTE — ASSESSMENT & PLAN NOTE
· The patient is going through withdrawals  Her CIWA level actually today in the morning was 27 and that is when she would could not be admitted to Psychiatry  She was given Ativan 2 mg on Librium dose and it improved  Will admit to medical level to step-down he patient has mild tremors she is sinus tachycardic I think she under place of how much she drinks she states she drinks 3 beers yesterday and her alcohol level was 315  She states she drinks twice a week at but suspect she drinks more than that  She was just recently admitted for same exact thing alcohol intoxication with intentional overdose  I will continue her on CIWA and add Librium 25 mg p o  Q 8 hours  Monitor for any adjustments  Continue thiamine folic acid and multivitamin  Her episode of diarrhea and vomiting is secondary to her withdrawal and intoxication patient has not eaten anything but drink  Will provide Zofran p r n  Start her on clears and advance as tolerated  I did review EKG QTC is 460  Place her on  mL per hour

## 2019-12-24 VITALS
RESPIRATION RATE: 20 BRPM | WEIGHT: 192.02 LBS | OXYGEN SATURATION: 97 % | HEART RATE: 63 BPM | BODY MASS INDEX: 34.02 KG/M2 | TEMPERATURE: 96.6 F | DIASTOLIC BLOOD PRESSURE: 84 MMHG | SYSTOLIC BLOOD PRESSURE: 135 MMHG | HEIGHT: 63 IN

## 2019-12-24 PROBLEM — F19.929: Chronic | Status: ACTIVE | Noted: 2019-12-24

## 2019-12-24 PROBLEM — F55.8: Chronic | Status: ACTIVE | Noted: 2019-12-24

## 2019-12-24 PROBLEM — F19.94: Chronic | Status: ACTIVE | Noted: 2019-12-24

## 2019-12-24 LAB
ALBUMIN SERPL BCP-MCNC: 3.5 G/DL (ref 3–5.2)
ALP SERPL-CCNC: 74 U/L (ref 43–122)
ALT SERPL W P-5'-P-CCNC: 50 U/L (ref 9–52)
ANION GAP SERPL CALCULATED.3IONS-SCNC: 6 MMOL/L (ref 5–14)
AST SERPL W P-5'-P-CCNC: 27 U/L (ref 14–36)
BASOPHILS # BLD AUTO: 0 THOUSANDS/ΜL (ref 0–0.1)
BASOPHILS NFR BLD AUTO: 0 % (ref 0–1)
BILIRUB SERPL-MCNC: 1.6 MG/DL
BUN SERPL-MCNC: 3 MG/DL (ref 5–25)
CALCIUM SERPL-MCNC: 8.7 MG/DL (ref 8.4–10.2)
CHLORIDE SERPL-SCNC: 102 MMOL/L (ref 97–108)
CO2 SERPL-SCNC: 28 MMOL/L (ref 22–30)
CREAT SERPL-MCNC: 0.62 MG/DL (ref 0.6–1.2)
EOSINOPHIL # BLD AUTO: 0 THOUSAND/ΜL (ref 0–0.4)
EOSINOPHIL NFR BLD AUTO: 0 % (ref 0–6)
ERYTHROCYTE [DISTWIDTH] IN BLOOD BY AUTOMATED COUNT: 13.7 %
GFR SERPL CREATININE-BSD FRML MDRD: 104 ML/MIN/1.73SQ M
GLUCOSE SERPL-MCNC: 103 MG/DL (ref 70–99)
HCT VFR BLD AUTO: 35.6 % (ref 36–46)
HGB BLD-MCNC: 12.2 G/DL (ref 12–16)
LYMPHOCYTES # BLD AUTO: 2.6 THOUSANDS/ΜL (ref 0.5–4)
LYMPHOCYTES NFR BLD AUTO: 40 % (ref 25–45)
MAGNESIUM SERPL-MCNC: 2.1 MG/DL (ref 1.6–2.3)
MCH RBC QN AUTO: 33 PG (ref 26–34)
MCHC RBC AUTO-ENTMCNC: 34.3 G/DL (ref 31–36)
MCV RBC AUTO: 96 FL (ref 80–100)
MONOCYTES # BLD AUTO: 1 THOUSAND/ΜL (ref 0.2–0.9)
MONOCYTES NFR BLD AUTO: 15 % (ref 1–10)
NEUTROPHILS # BLD AUTO: 2.9 THOUSANDS/ΜL (ref 1.8–7.8)
NEUTS SEG NFR BLD AUTO: 44 % (ref 45–65)
PHOSPHATE SERPL-MCNC: 3.4 MG/DL (ref 2.5–4.8)
PLATELET # BLD AUTO: 347 THOUSANDS/UL (ref 150–450)
PMV BLD AUTO: 7.3 FL (ref 8.9–12.7)
POTASSIUM SERPL-SCNC: 3.1 MMOL/L (ref 3.6–5)
PROT SERPL-MCNC: 6.4 G/DL (ref 5.9–8.4)
RBC # BLD AUTO: 3.7 MILLION/UL (ref 4–5.2)
SODIUM SERPL-SCNC: 136 MMOL/L (ref 137–147)
WBC # BLD AUTO: 6.5 THOUSAND/UL (ref 4.5–11)

## 2019-12-24 PROCEDURE — 99239 HOSP IP/OBS DSCHRG MGMT >30: CPT | Performed by: PSYCHIATRY & NEUROLOGY

## 2019-12-24 PROCEDURE — 84100 ASSAY OF PHOSPHORUS: CPT | Performed by: FAMILY MEDICINE

## 2019-12-24 PROCEDURE — 85025 COMPLETE CBC W/AUTO DIFF WBC: CPT | Performed by: FAMILY MEDICINE

## 2019-12-24 PROCEDURE — 80053 COMPREHEN METABOLIC PANEL: CPT | Performed by: FAMILY MEDICINE

## 2019-12-24 PROCEDURE — 83735 ASSAY OF MAGNESIUM: CPT | Performed by: FAMILY MEDICINE

## 2019-12-24 PROCEDURE — 99239 HOSP IP/OBS DSCHRG MGMT >30: CPT | Performed by: FAMILY MEDICINE

## 2019-12-24 RX ORDER — POTASSIUM CHLORIDE 20 MEQ/1
40 TABLET, EXTENDED RELEASE ORAL ONCE
Status: COMPLETED | OUTPATIENT
Start: 2019-12-24 | End: 2019-12-24

## 2019-12-24 RX ORDER — DISULFIRAM 250 MG/1
250 TABLET ORAL DAILY
Qty: 30 TABLET | Refills: 0 | Status: SHIPPED | OUTPATIENT
Start: 2019-12-24 | End: 2020-01-06

## 2019-12-24 RX ORDER — NICOTINE 21 MG/24HR
1 PATCH, TRANSDERMAL 24 HOURS TRANSDERMAL DAILY
Qty: 28 PATCH | Refills: 0 | Status: SHIPPED | OUTPATIENT
Start: 2019-12-24 | End: 2020-01-06

## 2019-12-24 RX ADMIN — SUCRALFATE 1 G: 1 TABLET ORAL at 06:04

## 2019-12-24 RX ADMIN — METOPROLOL TARTRATE 50 MG: 50 TABLET, FILM COATED ORAL at 08:41

## 2019-12-24 RX ADMIN — LEVOTHYROXINE SODIUM 50 MCG: 50 TABLET ORAL at 05:42

## 2019-12-24 RX ADMIN — LISINOPRIL 20 MG: 20 TABLET ORAL at 08:41

## 2019-12-24 RX ADMIN — PANTOPRAZOLE SODIUM 40 MG: 40 TABLET, DELAYED RELEASE ORAL at 05:43

## 2019-12-24 RX ADMIN — LAMOTRIGINE 50 MG: 25 TABLET ORAL at 08:41

## 2019-12-24 RX ADMIN — ENOXAPARIN SODIUM 40 MG: 40 INJECTION SUBCUTANEOUS at 08:41

## 2019-12-24 RX ADMIN — SODIUM CHLORIDE, SODIUM LACTATE, POTASSIUM CHLORIDE, AND CALCIUM CHLORIDE 125 ML/HR: .6; .31; .03; .02 INJECTION, SOLUTION INTRAVENOUS at 05:42

## 2019-12-24 RX ADMIN — CLONIDINE HYDROCHLORIDE 0.1 MG: 0.1 TABLET ORAL at 08:41

## 2019-12-24 RX ADMIN — FOLIC ACID 1 MG: 1 TABLET ORAL at 08:41

## 2019-12-24 RX ADMIN — POTASSIUM CHLORIDE 40 MEQ: 20 TABLET, EXTENDED RELEASE ORAL at 08:41

## 2019-12-24 RX ADMIN — Medication 1 TABLET: at 08:41

## 2019-12-24 RX ADMIN — THIAMINE HCL TAB 100 MG 100 MG: 100 TAB at 08:41

## 2019-12-24 RX ADMIN — CHLORDIAZEPOXIDE HYDROCHLORIDE 25 MG: 25 CAPSULE ORAL at 05:42

## 2019-12-24 RX ADMIN — VENLAFAXINE HYDROCHLORIDE 225 MG: 75 CAPSULE, EXTENDED RELEASE ORAL at 08:40

## 2019-12-24 RX ADMIN — NICOTINE 1 PATCH: 21 PATCH, EXTENDED RELEASE TRANSDERMAL at 08:41

## 2019-12-24 RX ADMIN — GABAPENTIN 400 MG: 400 CAPSULE ORAL at 08:41

## 2019-12-24 NOTE — ASSESSMENT & PLAN NOTE
· Patient stated she took a handful of Tylenol p m  Last night her Tylenol level was negative x2     · Was not a suicide attempt as per the patient she just took it for helping her with her pain and to help sleep

## 2019-12-24 NOTE — NURSING NOTE
Pt remains NSR on tele monitor, denies pain  Physical assessment remains unchanged  Call bell within reach

## 2019-12-24 NOTE — NURSING NOTE
Patient is ambulatory in [place]  She has been looking for her cell phone, but there is no record of her having the phone while she has been in the hospital  Communicated with both Security and ED  Neither of them have any record of a cell phone  Móncia Pinedo called her dad to pick her up  AVS given and Discussed with her all medications and follow-up appointments  She states understanding of the same

## 2019-12-24 NOTE — NURSING NOTE
Received pt in PM  On assessment AAOx4, denies pain, denies SI  On physical assessment lung sounds clear/diminished, S1S2, abdomen soft/nontender/nondistended with + bowel sounds, peripheral pulses +2/+1, no edema noted  Pt reports having 1 episode of diarrhea, denies any other withdrawal sx at this time  On tele monitor pt is NSR  Call bell within reach

## 2019-12-24 NOTE — DISCHARGE INSTRUCTIONS
Abuse of Alcohol   WHAT YOU NEED TO KNOW:   · Alcohol abuse is unhealthy drinking behavior  You may drink too much at one time once a week, or continue to drink too much daily  You continue to drink even though it causes problems  The problems can be alcohol related legal problems, or problems with work or relationships with family  · If you drink too much at one time, you are binge drinking  Binge drinking is when you have a large amount of alcohol in a short time  Your blood alcohol concentrations (MYLES) goes above 0 08 g/dLlevel during binge drinking  For men, this usually happens with more than 4 drinks in 2 hours  For women, it is more than 3 drinks in 2 hours  A drink is 12 ounces of beer, 4 ounces of wine, or 1½ ounces of liquor  DISCHARGE INSTRUCTIONS:   Call 911 for any of the following:   · You have sudden chest pain or trouble breathing  · You have a seizure or have shaking or trembling  · You were in an accident because of alcohol  Return to the emergency department if:   · You want to harm yourself or others  · You have hallucinations (you see or hear things that are not real)  · You cannot stop vomiting or you vomit blood  Contact your healthcare provider if:   · You need help to stop drinking alcohol  · You have questions or concerns about your condition or care  Medicines:   · Vitamin supplements  may be given to treat low vitamin levels  Alcohol can make it hard for your body to absorb enough vitamins such as B1  Vitamin supplements may also be given to prevent alcohol related brain damage  · Take your medicine as directed  Contact your healthcare provider if you think your medicine is not helping or if you have side effects  Tell him or her if you are allergic to any medicine  Keep a list of the medicines, vitamins, and herbs you take  Include the amounts, and when and why you take them   Bring the list or the pill bottles to follow-up visits  Carry your medicine list with you in case of an emergency  Treatments or therapies you may need:   · Detoxification (detox) and withdrawal  is a program that helps you to safely get alcohol out of your body  Detox can also help get rid of the physical need to drink  Healthcare providers monitor the physical symptoms of withdrawal  They may give you medicines to help decrease nausea, dehydration, and seizures  Healthcare providers will also monitor your blood pressure, heart and breathing rates, and your temperature  Symptoms of anxiety, depression, and suicidal thoughts are also monitored and managed during detox  Healthcare providers may give you medicines for these symptoms and therapy sessions will be available to you  Detox is usually done at a detox center or in a hospital  Healthcare providers do not recommend that you try to detox at home or by yourself  Withdrawal symptoms may become life-threatening  The center can help you find 12 step programs or an individual therapist to help with emotional support after detox  · Inpatient and outpatient treatment  focus on your personal needs to help you stop drinking  Treatment helps you understand the reasons you abuse alcohol  Counselors and therapists provide you with support and help you find ways to cope instead of drinking  You may need inpatient treatment to provide a controlled environment  You may need outpatient treatment after your inpatient treatment is complete  · Alcohol aversion therapy  takes away the desire to drink by causing a negative reaction when you drink  Healthcare providers may give you medicines that cause nausea and vomiting when you drink alcohol  They may instead give you a medicine that decreases your urge to drink alcohol  These medicines are used to help you stop drinking or reduce the amount you drink  They can also help you avoid relapse    Follow up with your healthcare provider as directed:  Write down your questions so you remember to ask them during your visits  Avoid alcohol:  You should stop drinking entirely  Alcohol can damage your brain, heart, and liver  It also increases your risk for injury, high blood pressure, and certain types of cancer  Alcohol is dangerous when you combine it with certain medicines  Do not drive if you have had alcohol:  Make sure someone who has not been drinking can help you get home  Get support:  Most people need support to stop drinking alcohol  Mental health providers, support groups, rehabilitation centers, and your healthcare provider can provide support  For more information:   · Alcoholics Anonymous  Web Address: http://[employer]/  · Substance Abuse and [address] [place]  Web Address: https://[employer]/  © 2017 [address] Information is for End User's use only and may not be sold, redistributed or otherwise used for commercial purposes  All illustrations and images included in CareNotes® are the copyrighted property of A D A M , Inc  or Dennis Hobson  The above information is an  only  It is not intended as medical advice for individual conditions or treatments  Talk to your doctor, nurse or pharmacist before following any medical regimen to see if it is safe and effective for you  Cigarette Smoking and Your Health   WHAT YOU NEED TO KNOW:   What are the risks to my health if I smoke tobacco?  Nicotine and other chemicals found in tobacco damage every cell in your body  Even if you are a light smoker, you have an increased risk for cancer, heart disease, and lung disease  If you are pregnant or have diabetes, smoking increases your risk for complications  What are the benefits to my health if I stop smoking? · You decrease respiratory symptoms such as coughing, wheezing, and shortness of breath       · You reduce your risk for cancers of the lung, mouth, throat, kidney, bladder, pancreas, stomach, and cervix  If you already have cancer, you increase the benefits of chemotherapy  You also reduce your risk for cancer returning or a second cancer from developing  · You reduce your risk for heart disease, blood clots, heart attack, and stroke  · You reduce your risk for lung infections, and diseases such as pneumonia, asthma, chronic bronchitis, and emphysema  · Your circulation improves  More oxygen can be delivered to your body  If you have diabetes, you lower your risk for complications, such as kidney, artery, and eye diseases  You also lower your risk for nerve damage  Nerve damage can lead to amputations, poor vision, and blindness  · You improve your body's ability to heal and to fight infections  What are the health benefits to others if I stop smoking? Tobacco is harmful to nonsmokers who breathe in your secondhand smoke  The following are ways the health of others around you may improve when you stop smoking:  · You lower the risks for lung cancer and heart disease in nonsmoking adults  · If you are pregnant, you lower the risk for miscarriage, early delivery, low birth weight, and stillbirth  You also lower your baby's risk for SIDS, obesity, developmental delay, and neurobehavioral problems, such as ADHD  · If you have children, you lower their risk for ear infections, colds, pneumonia, bronchitis, and asthma  Where can I find more information and support to stop smoking? · [employer]  Phone: 2- 314 - 951-2108  Web Address: www [employer]  CARE AGREEMENT:   You have the right to help plan your care  Learn about your health condition and how it may be treated  Discuss treatment options with your caregivers to decide what care you want to receive  You always have the right to refuse treatment  The above information is an  only  It is not intended as medical advice for individual conditions or treatments  Talk to your doctor, nurse or pharmacist before following any medical regimen to see if it is safe and effective for you  © 2017 [address] Information is for End User's use only and may not be sold, redistributed or otherwise used for commercial purposes  All illustrations and images included in CareNotes® are the copyrighted property of [employer]  or Dennis Hobson  How to Stop Smoking   AMBULATORY CARE:   You will improve your health and the health of others around you  if you stop smoking  Your risk for heart and lung disease, cancer, stroke, heart attack, and vision problems will also decrease  You can benefit from quitting no matter how long you have smoked  Prepare to stop smoking:  Nicotine is a highly addictive drug found in cigarettes  Withdrawal symptoms can happen when you stop smoking and make it hard to quit  These include anxiety, depression, irritability, trouble sleeping, and increased appetite  You increase your chances of success if you prepare to quit  · Set a quit date  Damion Solano a date that is within the next 2 weeks  Do not pick a day that you think may be stressful or busy  Write down the day or [place] it on your calender  · Tell friends and family that you plan to quit  Explain that you may have withdrawal symptoms when you try to quit  Ask them to support you  They may be able to encourage you and help reduce your stress to make it easier for you to quit  · Make a list of your reasons for quitting  Put the list somewhere you will see it every day, such as your refrigerator  You can look at the list when you have a craving  · Remove all tobacco and nicotine products from your home, car, and workplace  Also, remove anything else that will tempt you to smoke, such as lighters, matches, or ashtrays  Clean your car, home, and places at work that smell like smoke  The smell of smoke can trigger a craving       · Identify triggers that make you want to smoke  This may include activities, feelings, or people  Also write down 1 way you can deal with each of your triggers  For example, if you want to smoke as soon as you wake up, plan another activity during this time, such as exercise  · Make a plan for how you will quit  Learn about the tools that can help you quit, such as medicine, counseling, or nicotine replacement therapy  Choose at least 2 options to help you quit  Tools to help you stop smoking:   · Counseling  from a trained healthcare provider can provide you with support and skills to quit smoking  The provider will also teach you to manage your withdrawal symptoms and cravings  You may receive counseling from one counselor, in group therapy, or through phone therapy called a quit line  · Nicotine replacement therapy (NRT)  such as nicotine patches, gum, or lozenges may help reduce your nicotine cravings  You may get these without a doctor's order  Do not use e-cigarettes or smokeless tobacco in place of cigarettes or to help you quit  They still contain nicotine  · Prescription medicines  such as nasal sprays or nicotine inhalers may help reduce your withdrawal symptoms  Other medicines may also be used to reduce your urge to smoke  Ask your healthcare provider about these medicines  You may need to start certain medicines 2 weeks before your quit date for them to work well  · Hypnosis  is a practice that helps guide you through thoughts and feelings  Hypnosis may help decrease your cravings and make you more willing to quit  · Acupuncture therapy  uses very thin needles to balance energy channels in the body  This is thought to help decrease cravings and symptoms of nicotine withdrawal      · Support groups  let you talk to others who are trying to quit or have already quit  It may be helpful to speak with others about how they quit  Manage your cravings:   · Avoid situations, people, and places that tempt you to smoke    Go to nonsmoking places, such as libraries or restaurants  Understand what tempts you and try to avoid these things  · Keep your hands busy  Hold things such as a stress ball or pen  · Put candy or toothpicks in your mouth  Keep lollipops, sugarless gum, or toothpicks with you at all times  · Do not have alcohol or caffeine  These drinks may tempt you to smoke  Drink healthy liquids such as water or juice instead  · Reward yourself when you resist your cravings  Rewards will motivate you and help you stay positive  · Do an activity that distracts you from your craving  Examples include going for a walk, exercising, or cleaning  Prevent weight gain after you quit:  You may gain a few pounds after you quit smoking  It is healthier for you to gain a few pounds than to continue to smoke  The following can help you prevent weight gain:  · Eat healthy foods  These include fruits, vegetables, whole-grain breads, low-fat dairy products, beans, lean meats, and fish  Eat healthy snacks, such as low-fat yogurt, if you get hungry between meals  · Drink water before, during, and between meals  This will make your stomach feel full and help prevent you from overeating  Ask your healthcare provider how much liquid to drink each day and which liquids are best for you  · Exercise  Take a walk or do some kind of exercise every day  Ask your healthcare provider what exercise is right for you  This may help reduce your cravings and reduce stress  For more support and information:   · Smokefree  gov  Phone: 1- 166 - 067-6083  Web Address: www smokefree  gov  © 2017 [address] Information is for End User's use only and may not be sold, redistributed or otherwise used for commercial purposes  All illustrations and images included in CareNotes® are the copyrighted property of [employer]  or Dennis Hobson  The above information is an  only   It is not intended as medical advice for individual conditions or treatments  Talk to your doctor, nurse or pharmacist before following any medical regimen to see if it is safe and effective for you  Hypokalemia   WHAT YOU NEED TO KNOW:   What is hypokalemia? Hypokalemia is a low level of potassium in your blood  Potassium helps control how your muscles, heart, and digestive system work  Hypokalemia occurs when your body loses too much potassium or does not absorb enough from food  What causes hypokalemia? · Diarrhea or vomiting    · Medicines, such as diuretics, blood pressure medicines, or antibiotics    · Excessive use of laxatives    · Anorexia or bulimia nervosa    · Medical conditions, such as Cushing syndrome or kidney disease    · Not eating enough foods that contain potassium  What are the signs and symptoms of hypokalemia? You may not have any signs or symptoms if you have mild hypokalemia  You may have any of the following if it is more severe:  · Fatigue    · Constipation    · Frequent urination or urinating large amounts    · Muscle cramps or skin tingling    · Muscle weakness    · Fast or irregular heartbeat  How is hypokalemia diagnosed? · An EKG  test records your heart rhythm and how fast your heart beats  It is used to check for an irregular heartbeat  · Blood tests  are done to check your potassium level  How is hypokalemia treated? You will receive potassium to bring your levels back to normal  This may be given as a pill or IV  The amount of potassium you will be given depends on your potassium level  What foods are high in potassium? Foods that are high in potassium include bananas, oranges, tomatoes, potatoes, and avocado  Rebolledo beans, turkey, salmon, lean beef, yogurt, and milk are also high in potassium  Ask your healthcare provider or dietitian for more information about foods that are high in potassium  When should I seek immediate care? · You cannot move your arm or leg      · You have a fast or irregular heartbeat  · You are too tired or weak to stand up  When should I contact my healthcare provider? · You are vomiting, or you have diarrhea  · You have numbness or tingling in your arms or legs  · Your symptoms do not go away or they get worse  · You have questions or concerns about your condition or care  CARE AGREEMENT:   You have the right to help plan your care  Learn about your health condition and how it may be treated  Discuss treatment options with your caregivers to decide what care you want to receive  You always have the right to refuse treatment  The above information is an  only  It is not intended as medical advice for individual conditions or treatments  Talk to your doctor, nurse or pharmacist before following any medical regimen to see if it is safe and effective for you  © 2017 [address] Schroeder Information is for End User's use only and may not be sold, redistributed or otherwise used for commercial purposes  All illustrations and images included in CareNotes® are the copyrighted property of A D A M , Inc  or [employer]  Clonidine (By mouth)   Clonidine (Jhonatan)  Treats high blood pressure  Also treats ADHD  Brand Name(s): Catapres, Kapvay   There may be other brand names for this medicine  When This Medicine Should Not Be Used: This medicine is not right for everyone  Do not use it if you had an allergic reaction to clonidine  How to Use This Medicine:   Long Acting Suspension, Tablet, Long Acting Tablet  · Take your medicine as directed  Your dose may need to be changed several times to find what works best for you  · Swallow the extended-release tablet whole  Do not crush, break, or chew it  · Clonidine extended-release tablets work differently than clonidine immediate-release tablets   Do not switch from the extended-release tablets to the immediate-release tablets unless your doctor tells you to   · Measure the oral liquid medicine with a marked measuring spoon, oral syringe, or medicine cup  Shake the bottle well before each use  · Read and follow the patient instructions that come with this medicine  Talk to your doctor or pharmacist if you have any questions  · Missed dose: Take a dose as soon as you remember  If it is almost time for your next dose, wait until then and take a regular dose  Do not take extra medicine to make up for a missed dose  If you miss more than 1 dose of clonidine tablets, check with your doctor right away  · Store the medicine in a closed container at room temperature, away from heat, moisture, and direct light  Drugs and Foods to Avoid:   Ask your doctor or pharmacist before using any other medicine, including over-the-counter medicines, vitamins, and herbal products  · Do not use this medicine together with other products that contain clonidine  · Some medicines can affect how clonidine works  Tell your doctor if you are taking depression medicine  · Tell your doctor if you use anything else that makes you sleepy  Some examples are allergy medicine, narcotic pain medicine, and alcohol  Warnings While Using This Medicine:   · Tell your doctor if you are pregnant, breastfeeding, or have kidney disease, heart or blood vessel disease, heart rhythm problems, stomach or bowel problems, or a history of heart attack or stroke  · This medicine may make you drowsy, dizzy, or lightheaded  Do not drive or do anything that could be dangerous until you know how this medicine affects you  · This medicine may cause dryness of the eyes  Talk to your doctor about how to treat the dryness  · Do not stop using this medicine suddenly  Your doctor will need to slowly decrease your dose before you stop it completely  · Tell any doctor or dentist who treats you that you are using this medicine   You may need to stop using this medicine several days before you have surgery or medical tests   · Even if you feel well, do not stop using the medicine without asking your doctor first  This medicine will not cure your high blood pressure, but it will help keep it in the normal range  You may have to take blood pressure medicine for the rest of your life  · Your doctor will check your progress and the effects of this medicine at regular visits  Keep all appointments  · Keep all medicine out of the reach of children  Never share your medicine with anyone  Possible Side Effects While Using This Medicine:   Call your doctor right away if you notice any of these side effects:  · Allergic reaction: Itching or hives, swelling in your face or hands, swelling or tingling in your mouth or throat, chest tightness, trouble breathing  · Fast, slow, pounding, or uneven heartbeat  · Lightheadedness, dizziness, fainting  · Swelling in your hands, ankles, or feet  · Unusual tiredness or weakness  If you notice these less serious side effects, talk with your doctor:   · Constipation, stomach pain  · Dry eyes, mouth, or throat  · Mild skin rash  · New or worsening headache  If you notice other side effects that you think are caused by this medicine, tell your doctor  Call your doctor for medical advice about side effects  You may report side effects to FDA at 8-412-FDA-6732  © 2017 [address]  Information is for End User's use only and may not be sold, redistributed or otherwise used for commercial purposes  The above information is an  only  It is not intended as medical advice for individual conditions or treatments  Talk to your doctor, nurse or pharmacist before following any medical regimen to see if it is safe and effective for you  Disulfiram (By mouth)   Disulfiram ([id number])  Used as part of a treatment plan for problem drinking  Creates an unpleasant reaction when drinking alcohol, which reduces the desire to drink   This medicine is part of a recovery program that includes medical supervision and counseling  Brand Name(s): Antabuse   There may be other brand names for this medicine  When This Medicine Should Not Be Used: You should not use this medicine if you have had an allergic reaction to disulfiram or to thiuram chemicals used in pesticides and rubber manufacturing  You should not use disulfiram if you have recently used metronidazole (Flagyl®) or paraldehyde ([place]), or if you have severe heart disease or mental illness  Do not start taking disulfiram for at least 12 hours after drinking alcohol or swallowing any product that contains alcohol (such as cough or cold medicines, tonics, mouthwash, food sauces, vinegar, etc)  How to Use This Medicine:   Tablet  · Your doctor will tell you how much of this medicine to take and how often  Do not take more medicine or take it more often than your doctor tells you to  · Take your medicine in the morning unless your doctor tells you otherwise  If this medicine makes you sleepy, you may take it at bedtime  If a dose is missed:   · If you miss a dose or forget to take your medicine, take it as soon as you can  If you are more than 12 hours late, wait until it is time for your next dose to take the medicine and skip the missed dose  · Do not use extra medicine to make up for a missed dose  How to Store and Dispose of This Medicine:   · Store the medicine in its original container at room temperature, away from heat, moisture, and direct light  · Keep all medicine out of the reach of children and never share your medicine with anyone  Drugs and Foods to Avoid:   Ask your doctor or pharmacist before using any other medicine, including over-the-counter medicines, vitamins, and herbal products  · DO NOT DRINK ALCOHOL WHILE YOU ARE USING THIS MEDICINE  EVEN SMALL AMOUNTS OF ALCOHOL CAN PRODUCE REACTIONS, WHICH CAN BE LIFE-THREATENING    · Make sure your doctor knows if you are also using blood thinners (Coumadin®), isoniazid (Rifamate®, Rifater®), phenytoin (Dilantin®), fosphenytoin (Cerebyx®), mephenytoin (Mesantoin®), or any medicines that make you sleepy (such as sleeping pills, cold and allergy medicine, narcotic pain killers, or sedatives)  Warnings While Using This Medicine:   · Make sure your doctor knows if you are pregnant or breastfeeding, or if you have heart disease, kidney disease, liver disease, diabetes, epilepsy, thyroid problems, or mental illness, or if you are allergic to rubber or latex  · If you consume alcohol while using disulfiram, you will have a reaction that can include: throbbing pain in the head and neck, trouble breathing, nausea and vomiting, sweating, flushing, thirst, chest pain, a fast or pounding heartbeat, lightheadedness or fainting, weakness, blurred vision, confusion, or dizziness  This reaction can last for 30 minutes to several hours  The more alcohol you consume, the worse a reaction will be  A severe reaction can result in death  · You may still have an alcohol reaction if you consume alcohol for up to 2 weeks after you stop taking disulfiram   · Make sure any doctor or dentist who treats you knows that you are using this medicine  You should carry an identification card or wear a medical alert bracelet to let others know you are taking disulfiram   · Your doctor will need to check your progress at regular visits while you are using this medicine  Be sure to keep all appointments  · This medicine may make you dizzy or drowsy  Avoid driving, using machines, or doing anything else that could be dangerous if you are not alert    Possible Side Effects While Using This Medicine:   Call your doctor right away if you notice any of these side effects:  · Blurred vision  · Numbness or tingling in the hands, legs, or feet  · Severe stomach pain, especially in the upper abdomen  · Skin rash or itching  · Yellow eyes or skin  If you notice these less serious side effects, talk with your doctor:   · Problems having sex  · Tiredness or drowsiness  · Unusual or unpleasant taste in your mouth  If you notice other side effects that you think are caused by this medicine, tell your doctor  Call your doctor for medical advice about side effects  You may report side effects to FDA at 1-348-FDA-4435  © 2017 [address] [employer] Information is for End User's use only and may not be sold, redistributed or otherwise used for commercial purposes  The above information is an  only  It is not intended as medical advice for individual conditions or treatments  Talk to your doctor, nurse or pharmacist before following any medical regimen to see if it is safe and effective for you  Gabapentin (By mouth)   Gabapentin (jose alejandro-a-PEN-tin)  Treats seizures and pain caused by shingles  Brand Name(s): ACTIVE-PAC with Gabapentin, Convenience Gary, Cyclo/Viet 10/300 Pack, FusePaq Fanatrex, Viet-V, Gralise, [address] Pack, Neurontin, SmartRx Viet Kit   There may be other brand names for this medicine  When This Medicine Should Not Be Used: This medicine is not right for everyone  Do not use it if you had an allergic reaction to gabapentin  How to Use This Medicine:   Capsule, Liquid, Tablet  · Take your medicine as directed  Your dose may need to be changed several times to find what works best for you  If you have epilepsy, do not allow more than 12 hours to pass between doses  · Capsule: Swallow the capsule whole with plenty of water  Do not open, crush, or chew it  · Gralise® tablet: Swallow the tablet whole   Do not crush, break, or chew it  · Neurontin® tablet: If you break a tablet into 2 pieces, use the second half as your next dose  If you don't use it within 28 days, throw it away  · Measure the oral liquid medicine with a marked measuring spoon, oral syringe, or medicine cup  · This medicine should come with a Medication Guide   Ask your pharmacist for a copy if you do not have one  · Missed dose: Take a dose as soon as you remember  If it is almost time for your next dose, wait until then and take a regular dose  Do not take extra medicine to make up for a missed dose  · Store the medicine in a closed container at room temperature, away from heat, moisture, and direct light  Store the Neurontin® oral liquid in the refrigerator  Do not freeze  Drugs and Foods to Avoid:   Ask your doctor or pharmacist before using any other medicine, including over-the-counter medicines, vitamins, and herbal products  · Some medicines can affect how gabapentin works  Tell your doctor if you also use any of the following:   ¨ Hydrocodone  ¨ Morphine  · If you take an antacid, wait at least 2 hours before you take gabapentin  · Tell your doctor if you use anything else that makes you sleepy  Some examples are allergy medicine, narcotic pain medicine, and alcohol  Warnings While Using This Medicine:   · Tell your doctor if you are pregnant or breastfeeding, or if you have kidney problems or are receiving dialysis  Tell your doctor if you have a history of depression or mental health problems  · This medicine may increase depression or thoughts of suicide  Tell your doctor right away if you start to feel more depressed or think about hurting yourself  · This medicine may cause a serious allergic reaction called multiorgan hypersensitivity, which can damage organs and be life-threatening  · Do not stop using this medicine suddenly  Your doctor will need to slowly decrease your dose before you stop it completely  If you take this medicine to prevent seizures, your seizures may return or occur more often if you stop this medicine suddenly  · This medicine may make you dizzy or drowsy  Do not drive or do anything else that could be dangerous until you know how this medicine affects you  · Tell any doctor or dentist who treats you that you are using this medicine   This medicine may affect certain medical test results  · Your doctor will check your progress and the effects of this medicine at regular visits  Keep all appointments  · Keep all medicine out of the reach of children  Never share your medicine with anyone  Possible Side Effects While Using This Medicine:   Call your doctor right away if you notice any of these side effects:  · Allergic reaction: Itching or hives, swelling in your face or hands, swelling or tingling in your mouth or throat, chest tightness, trouble breathing  · Behavior problems, aggression, restlessness, trouble concentrating, moodiness (especially in children)  · Blistering, peeling, red skin rash  · Change in how much or how often you urinate, bloody or cloudy urine,  · Chest pain, fast heartbeat, trouble breathing  · Dark urine or pale stools, nausea, vomiting, loss of appetite, stomach pain, yellow skin or eyes  · Fever, rash, swollen or tender glands in the neck, armpit, or groin  · Problems with coordination, shakiness, unsteadiness  · Rapid weight gain, swelling in your hands, ankles, or feet  · Unusual moods or behaviors, thoughts of hurting yourself, feeling depressed  If you notice these less serious side effects, talk with your doctor:   · Dizziness, drowsiness, sleepiness, tiredness  If you notice other side effects that you think are caused by this medicine, tell your doctor  Call your doctor for medical advice about side effects  You may report side effects to FDA at 9-823-FDA-6411  © 2017 [address] [employer] Information is for End User's use only and may not be sold, redistributed or otherwise used for commercial purposes  The above information is an  only  It is not intended as medical advice for individual conditions or treatments  Talk to your doctor, nurse or pharmacist before following any medical regimen to see if it is safe and effective for you        Lamotrigine (By mouth)   Lamotrigine (la-LAMINE-tri-jeen)  Treats seizures and bipolar disorder  Brand Name(s): LaMICtal, LaMICtal CD, LaMICtal ODT, LaMICtal ODT Patient Titration Kit Blue, LaMICtal ODT Patient Titration Kit Aniya Aviladinand, LaMICtal ODT Patient Titration Kit Orange, [employer] Kit Green, [employer], LaMICtal XR, LaMICtal XR Patient Titration Kit Blue, LaMICtal XR Patient Titration Kit Green, LaMICtal XR Patient Titration Kit Orange   There may be other brand names for this medicine  When This Medicine Should Not Be Used: This medicine is not right for everyone  Do not use it if you had an allergic reaction to lamotrigine  How to Use This Medicine:   Tablet, Chewable Tablet, Dissolving Tablet, Long Acting Tablet  · Take your medicine as directed  Your dose may need to be changed several times to find what works best for you  · Chewable tablet: You may swallow the tablet whole, or you may chew it and then swallow a small amount of water or diluted fruit juice  You may also dissolve the chewable tablet  To do this, put about 1 teaspoon of water or juice in a glass, drop in the tablet, let it sit for about 1 minute so it dissolves, swirl the glass to mix, and then swallow the entire mixture  · Disintegrating tablet: Make sure your hands are dry before you handle the tablet  Place the tablet on your tongue  Move the tablet around in your mouth so it dissolves  · Regular tablet: Swallow the tablet whole  You may break or crush the tablet if your doctor tells you to, but the medicine might leave a bitter taste in your mouth  · Swallow the extended-release tablet whole  Do not crush, break, or chew it  · This medicine should come with a Medication Guide  Ask your pharmacist for a copy if you do not have one  · Missed dose: Take a dose as soon as you remember  If it is almost time for your next dose, wait until then and take a regular dose  Do not take extra medicine to make up for a missed dose    · Store the medicine in a closed container at room temperature, away from heat, moisture, and direct light  Do not use if the blister pack is torn or broken  Drugs and Foods to Avoid:   Ask your doctor or pharmacist before using any other medicine, including over-the-counter medicines, vitamins, and herbal products  · Some foods and medicines can affect how lamotrigine works  Tell your doctor if you are using any of the following:  ¨ Atazanavir, ritonavir, lopinavir, rifampin  ¨ Birth control pills  ¨ Other medicine to control seizures, including carbamazepine, divalproex, oxcarbazepine, phenobarbital, phenytoin, primidone, valproic acid, valproate  Warnings While Using This Medicine:   · Tell your doctor if you are pregnant or breastfeeding, or if you have kidney disease, liver disease, or a history of depression  Tell your doctor if you have had a rash or an allergic reaction to other seizure medicine  · This medicine may cause the following problems:  ¨ Drug reaction with eosinophilia and systemic symptoms (DRESS), which may damage organs such as the liver, kidney, or heart  ¨ Increased risk of thoughts of suicide or other serious mood changes  ¨ Low blood cell counts, which may cause bleeding problems or increase your risk for infection  ¨ Meningitis  ¨ Severe skin rash that can lead to hospitalization or death  · This medicine may make you dizzy or drowsy  Do not drive or do anything else that could be dangerous until you know how this medicine affects you  · Do not stop using this medicine suddenly  Your doctor will need to slowly decrease your dose before you stop it completely  · Your doctor will do lab tests at regular visits to check on the effects of this medicine  Keep all appointments  · Keep all medicine out of the reach of children  Never share your medicine with anyone    Possible Side Effects While Using This Medicine:   Call your doctor right away if you notice any of these side effects:  · Allergic reaction: Itching or hives, swelling in your face or hands, swelling or tingling in your mouth or throat, chest tightness, trouble breathing  · Blistering, peeling, or red skin rash  · Feeling depressed, irritable, or restless  · Fever, chills, cough, sore throat, and body aches  · Fever, skin rash, or swollen glands in your armpits, neck, or groin  · Painful sores in your mouth or around your eyes  · Stiff neck or back, headache, fever, nausea, vomiting  · Thoughts of hurting yourself, other unusual thoughts or behaviors  · Unusual bleeding, bruising, or weakness  · Yellow skin or eyes  If you notice these less serious side effects, talk with your doctor:   · Blurred vision, double vision, or other vision problems  · Clumsiness, dizziness, sleepiness, problems with balance or walking  · Nausea, vomiting  · Runny or stuffy nose  If you notice other side effects that you think are caused by this medicine, tell your doctor  Call your doctor for medical advice about side effects  You may report side effects to FDA at 6-034-FDA-3653  © 2017 [address] [facility] Information is for End User's use only and may not be sold, redistributed or otherwise used for commercial purposes  The above information is an  only  It is not intended as medical advice for individual conditions or treatments  Talk to your doctor, nurse or pharmacist before following any medical regimen to see if it is safe and effective for you  Levothyroxine (By mouth)   Levothyroxine (dmw-svs-hogu-SHIN-een)  Treats hypothyroidism  Also treats an enlarged thyroid gland and thyroid cancer  Brand Name(s): Levoxyl, Synthroid, Tirosint, Unithroid   There may be other brand names for this medicine  When This Medicine Should Not Be Used: This medicine is not right for everyone  Do not use it if you had an allergic reaction to glycerol, or you recently had a heart attack  How to Use This Medicine:   Capsule, Liquid, Tablet  · Take your medicine as directed   Your dose may need to be changed several times to find what works best for you  You may have to take this medicine for 6 to 8 weeks before your symptoms start to get better  · Read and follow the patient instructions that come with this medicine  Talk to your doctor or pharmacist if you have any questions  · Take this medicine in the morning on an empty stomach  Wait at least 30 to 60 minutes before you eat any food  · Capsule: Swallow whole  Do not cut or crush it  · Oral liquid:   ¨ This medicine may be mixed with water or be given directly into the mouth  ¨ If mixed with water: Squeeze the contents of 1 single unit-dose ampule into a glass or cup containing water  Stir and drink it immediately  Add some water to the glass or cup and drink the water  This will help get all of the medicine out of the glass or cup  Do not mix this medicine with any other liquid except water  Do not store the mixture for later use  ¨ If taken without water: Squeeze the medicine directly into the mouth or into a spoon and swallow it immediately  · Tablet: If this medicine is being given to a baby or a small child, you can crush the tablet and mix it in 1 to 2 teaspoons (5 to 10 milliliters) of water  This mixture can be given by spoon or dropper  Do not mix the tablet with any other liquid except water  Do not store the mixture  If you do not give the medicine right after it is mixed, throw it away  · Missed dose: Take a dose as soon as you remember  If it is almost time for your next dose, wait until then and take a regular dose  Do not take extra medicine to make up for a missed dose  · Store the medicine in a closed container at room temperature, away from heat, moisture, and direct light  Use the oral liquid within 15 days after opening the pouch  Keep the ampules in the pouch until you are ready to use them    Drugs and Foods to Avoid:   Ask your doctor or pharmacist before using any other medicine, including over-the-counter medicines, vitamins, and herbal products  · Some foods and medicines can affect how levothyroxine works  Tell your doctor if you are using any of the following:  ¨ Amiodarone, dexamethasone, digoxin, imatinib, ketamine, phenobarbital, rifampin  ¨ Beta-blocker medicine  ¨ Blood thinner (including heparin, warfarin)  ¨ Insulin or diabetes medicine  ¨ Medicine to treat depression  · If you use antacids, medicine to treat high cholesterol (including cholestyramine, colesevelam, colestipol), orlistat, sevelamer, sucralfate, stomach medicine (including lansoprazole, omeprazole, pantoprazole), or any medicine that contains calcium or iron, take it at least 4 hours before or 4 hours after you take levothyroxine  · Cottonseed meal, dietary fiber, soybean flour (infant formula), or walnuts may decrease the absorption of this medicine  Talk with your doctor if you have questions  · Do not eat grapefruit or drink grapefruit juice while you are using this medicine  Warnings While Using This Medicine:   · Tell your doctor if you are pregnant or breastfeeding, or if you have anemia, blood clotting problems, diabetes, heart disease, osteoporosis, pituitary gland problems, or adrenal gland problems  Tell your doctor if you have recently received radiation therapy with iodine  Do not use this medicine to treat obesity or to lose weight  · Tell any doctor or dentist who treats you that you take this medicine  · Do not stop using this medicine suddenly  Your doctor will need to slowly decrease your dose before you stop it completely  · Your doctor will do lab tests at regular visits to check on the effects of this medicine  Keep all appointments  · Keep all medicine out of the reach of children  Never share your medicine with anyone    Possible Side Effects While Using This Medicine:   Call your doctor right away if you notice any of these side effects:  · Allergic reaction: Itching or hives, swelling in your face or hands, swelling or tingling in your mouth or throat, chest tightness, trouble breathing  · Chest pain that may spread, trouble breathing, unusual sweating, fainting  · Fast, pounding, or uneven heartbeat  · Seizures or tremors  · Severe headache, blurred or double vision, nausea, vomiting (in children)  · Walking with a limp, knee or hip pain (in children)  If you notice these less serious side effects, talk with your doctor:   · Appetite or weight changes  · Changes in your menstrual periods  · Hair loss  · Nervousness, sensitivity to heat, sweating  If you notice other side effects that you think are caused by this medicine, tell your doctor  Call your doctor for medical advice about side effects  You may report side effects to FDA at 8-526-FDA-7549  © 2017 [address] [employer] Information is for End User's use only and may not be sold, redistributed or otherwise used for commercial purposes  The above information is an  only  It is not intended as medical advice for individual conditions or treatments  Talk to your doctor, nurse or pharmacist before following any medical regimen to see if it is safe and effective for you  Lurasidone (By mouth)   Lurasidone (sir-YAP-l-done)  Treats schizophrenia and depression  Brand Name(s): Latuda   There may be other brand names for this medicine  When This Medicine Should Not Be Used: This medicine is not right for everyone  Do not use it if you had an allergic reaction to lurasidone  How to Use This Medicine:   Tablet  · Take your medicine as directed  Your dose may need to be changed several times to find what works best for you  · It is best to take this medicine with food or milk  · Keep using this medicine for the full treatment time, even if you feel better after the first few doses  · This medicine should come with a Medication Guide  Ask your pharmacist for a copy if you do not have one  · Missed dose: Take a dose as soon as you remember   If it is almost time for your next dose, wait until then and take a regular dose  Do not take extra medicine to make up for a missed dose  · Store the medicine in a closed container at room temperature, away from heat, moisture, and direct light  Drugs and Foods to Avoid:   Ask your doctor or pharmacist before using any other medicine, including over-the-counter medicines, vitamins, and herbal products  · Do not use this medicine if you are using certain other medicines including carbamazepine, clarithromycin, ketoconazole, phenytoin, rifampin, ritonavir, Amena's wort, or voriconazole  · Some medicines can affect how lurasidone works  Tell your doctor if you are using any of the following:  ¨ Atazanavir, bosentan, diltiazem, efavirenz, erythromycin, etravirine, fluconazole, modafinil, nafcillin, or verapamil  ¨ Blood pressure medicine  · Do not eat grapefruit or drink grapefruit juice while you are using this medicine  · Tell your doctor if you use anything else that makes you sleepy  Some examples are allergy medicine, narcotic pain medicine, and alcohol  · Do not drink alcohol while you are using this medicine  Warnings While Using This Medicine:   · Tell your doctor if you are pregnant or breastfeeding, or if you have kidney disease, liver disease, blood or bone marrow problems, diabetes, prolactin-dependent breast cancer, Parkinson's disease, trouble swallowing, or a history of seizures or neuroleptic malignant syndrome (NMS)  Tell your doctor if you have any kind of blood vessel or heart problems, including low blood pressure, heart failure, a low amount of blood, heart rhythm problems, or a history of a heart attack or stroke  · This medicine may cause the following problems:  ¨ Increased risk of stroke  ¨ Neuroleptic malignant syndrome (NMS)  ¨ Tardive dyskinesia (a movement disorder)  ¨ Changes in blood sugar levels  · This medicine can increase thoughts of suicide   Tell your doctor right away if you start to feel depressed and have thoughts about hurting yourself  · This medicine may make you bleed, bruise, or get infections more easily  Take precautions to prevent illness and injury  Wash your hands often  · This medicine may make you dizzy or drowsy, or to have trouble with thinking or controlling body movements, which may lead to falls, fractures or other injuries  Do not drive or do anything else that could be dangerous until you know how this medicine affects you  · This medicine might reduce how much you sweat  Your body could get too hot if you do not sweat enough  If your body gets too hot, you might feel dizzy, weak, tired, or confused  You might vomit or have an upset stomach  Do not get too hot while you are exercising  Avoid places that are very hot  · Tell any doctor or dentist who treats you that you are using this medicine  This medicine may affect certain medical test results  · Your doctor will do lab tests at regular visits to check on the effects of this medicine  Keep all appointments  · Keep all medicine out of the reach of children  Never share your medicine with anyone    Possible Side Effects While Using This Medicine:   Call your doctor right away if you notice any of these side effects:  · Allergic reaction: Itching or hives, swelling in your face or hands, swelling or tingling in your mouth or throat, chest tightness, trouble breathing  · Chest pain, fast, slow, pounding, or uneven heartbeat  · Confusion, double vision, headache, trouble speaking, thinking, or walking  · Fever, chills, cough, runny or stuffy nose, sore throat, body aches  · Increased thirst, hunger, or urination  · Lightheadedness, dizziness, or fainting  · Mood or behavioral changes, or thoughts of hurting yourself or others  · Neck muscle spasm, throat tightness, trouble swallowing or breathing, or sticking out of the tongue  · Seizures  · Sweating, muscle stiffness  · Twitching or muscle movements you cannot control (often in your face, tongue, or jaw)  If you notice these less serious side effects, talk with your doctor:   · Anxiety or restlessness  · Diarrhea, nausea, vomiting, or upset stomach  · Sleepiness or unusual drowsiness, tiredness  · Weight gain  If you notice other side effects that you think are caused by this medicine, tell your doctor  Call your doctor for medical advice about side effects  You may report side effects to FDA at 3-192-FDA-2197  © 2017 [address]  Information is for End User's use only and may not be sold, redistributed or otherwise used for commercial purposes  The above information is an  only  It is not intended as medical advice for individual conditions or treatments  Talk to your doctor, nurse or pharmacist before following any medical regimen to see if it is safe and effective for you  Metoprolol (By mouth)   Metoprolol (met-oh-PROE-lol)  Treats high blood pressure, angina (chest pain), and heart failure  May lower the risk of death after a heart attack  This medicine is a beta-blocker  Brand Name(s): Lopressor, Toprol XL   There may be other brand names for this medicine  When This Medicine Should Not Be Used: This medicine is not right for everyone  Do not use if you had an allergic reaction to metoprolol or similar medicines  Do not use this medicine if you have certain blood circulation or heart problems  Ask your doctor about these problems  How to Use This Medicine:   Tablet, Long Acting Tablet  · Take your medicine as directed  Your dose may need to be changed several times to find what works best for you  · Take this medicine with a meal or right after a meal  Take this medicine the same way every day, at the same time  · Swallow the tablet whole with a glass of water  You may break the extended-release tablet in half, but do not chew or crush it  · Missed dose: Take a dose as soon as you remember   If it is almost time for your next dose, wait until then and take a regular dose  Do not take extra medicine to make up for a missed dose  · Store the medicine in a closed container at room temperature, away from heat, moisture, and direct light  Drugs and Foods to Avoid:   Ask your doctor or pharmacist before using any other medicine, including over-the-counter medicines, vitamins, and herbal products  · Some medicines can affect how metoprolol works  Tell your doctor if you are taking any of the following:   ¨ Digoxin, dipyridamole, hydralazine, hydroxychloroquine, methyldopa, quinidine  ¨ Medicine to treat depression (such as bupropion, clomipramine, desipramine, fluoxetine, fluvoxamine, paroxetine, sertraline), medicine to treat mental illness (such as chlorpromazine, fluphenazine, haloperidol, thioridazine), medicine for heart rhythm problems (such as propafenone), HIV/AIDS medicine (such as ritonavir), medicine to treat a fungus infection (such as terbinafine), a monoamine oxidase inhibitor (MAOI), an ergot medicine for headaches, a calcium channel blocker (such as amlodipine, diltiazem, verapamil), or an alpha blocker (such as clonidine, prazosin, reserpine, guanethidine)  Warnings While Using This Medicine:   · Tell your doctor if you are pregnant or breastfeeding, or if you have blood vessel, heart, or circulation problems (such as heart failure, rhythm problems, or slow heartbeat)  Tell your doctor if you have kidney disease, liver disease, diabetes, lung disease (such as asthma), an overactive thyroid, or a history of allergies  · This medicine may cause worse symptoms of heart failure while the dose is being adjusted  · Do not stop using this medicine suddenly  Your doctor will need to slowly decrease your dose before you stop it completely  · Tell any doctor or dentist who treats you that you are using this medicine  You may need to stop using this medicine several days before you have surgery or medical tests    · This medicine could lower your blood pressure too much, especially when you first use it or if you are dehydrated  Stand or sit up slowly if you feel lightheaded or dizzy  · This medicine may make you dizzy or drowsy  Do not drive or do anything else that could be dangerous until you know how this medicine affects you  · Your doctor will check your progress and the effects of this medicine at regular visits  Keep all appointments  · Keep all medicine out of the reach of children  Never share your medicine with anyone  Possible Side Effects While Using This Medicine:   Call your doctor right away if you notice any of these side effects:  · Allergic reaction: Itching or hives, swelling in your face or hands, swelling or tingling in your mouth or throat, chest tightness, trouble breathing  · Lightheadedness, dizziness, or fainting  · Slow heartbeat  · Swelling in your hands, ankles, or feet, trouble breathing, tiredness  · Worsening chest pain  If you notice these less serious side effects, talk with your doctor:   · Diarrhea  · Mild dizziness or tiredness  If you notice other side effects that you think are caused by this medicine, tell your doctor  Call your doctor for medical advice about side effects  You may report side effects to FDA at 5-805-FDA-1864  © 2017 [address]  Information is for End User's use only and may not be sold, redistributed or otherwise used for commercial purposes  The above information is an  only  It is not intended as medical advice for individual conditions or treatments  Talk to your doctor, nurse or pharmacist before following any medical regimen to see if it is safe and effective for you  Nicotine (Absorbed through the skin)   Nicotine (GILBERT-oh-teen)  Helps you quit smoking     Brand Name(s): [facility] Nicotine Transdermal System, [employer] Nicotine Transdermal System, [employer] Nicotine Transdermal System, Nicoderm CQ, Nicoderm CQ Clear, Nicotrol, [facility] Nicotine, [employer] Nicotine Transdermal System   There may be other brand names for this medicine  When This Medicine Should Not Be Used: This medicine is not right for everyone  Do not use it if you had an allergic reaction to nicotine  How to Use This Medicine:   Patch  · Follow the instructions on the medicine label if you are using this medicine without a prescription  · Read and follow the patient instructions that come with this medicine  Talk to your doctor or pharmacist if you have any questions  · Wash your hands with soap and water before and after applying a patch  · Leave the patch in its sealed wrapper until you are ready to put it on  Tear the wrapper open carefully  NEVER CUT the wrapper or the patch with scissors  Do not use any patch that has been cut by accident  · NicoDerm® CQ:   ¨ This is a 3-step program  If you smoke more than 10 cigarettes per day, start with step 1 followed by step 2 and step 3  If you smoke 10 or fewer cigarettes per day, start with step 2 followed by step 3  ¨ Begin using the patch on the morning of your quit day, even if you are not able to stop smoking immediately  ¨ Apply the patch at about the same time every day to skin that is clean, dry, and free of hair  ¨ The patient instructions will show the body areas where you can wear the patch  When putting on each new patch, choose a different place within these areas  Do not put the new patch on the same place you wore the last one  Be sure to remove the old patch before applying a new one  ¨ Do not put the patch over irritated skin  Do not use creams or lotions, including sunscreen, on the skin where you apply the patch, because it may not stick well  ¨ Apply a new patch if one falls off  ¨ If you have vivid dreams or sleep problems, remove the patch at bedtime and apply a new one in the morning  · Keep using this medicine for the full treatment time   If you feel you need to use this medicine for a longer period of time, talk to your doctor  · Store the patches at room temperature in a closed container, away from heat, moisture, and direct light  · Fold the used patch in half with the sticky sides together  Throw any used patch away so that children or pets cannot get to it  You will also need to throw away old patches after the expiration date has passed  Drugs and Foods to Avoid:      Ask your doctor or pharmacist before using any other medicine, including over-the-counter medicines, vitamins, and herbal products  Warnings While Using This Medicine:   · Pregnant or breastfeeding women should only use this medicine as directed by a doctor  Smoking can seriously harm your unborn child  Try to stop smoking without using medicine  Although this medicine is believed to be safer than smoking, the risks of its use during pregnancy are not fully known  · Tell your doctor if you have heart disease, heart rhythm problems, high blood pressure, or you had a recent heart attack  Tell your doctor if you have an allergy to adhesive tape  · This medicine may cause the following problems:  ¨ High blood pressure  ¨ Increase in heart rate  · The opaque NicoDerm® CQ patch may cause skin burns if you have a procedure called a magnetic resonance imaging (MRI) scan  You must remove the patch before an MRI  · Keep all medicine out of the reach of children  Never share your medicine with anyone    Possible Side Effects While Using This Medicine:   Call your doctor right away if you notice any of these side effects:  · Allergic reaction: Itching or hives, swelling in your face or hands, swelling or tingling in your mouth or throat, chest tightness, trouble breathing  · Dizziness, headache, upset stomach, drooling, vomiting, diarrhea, cold sweats, blurred vision, trouble hearing, confusion, fainting, or weakness  · Fast, slow, pounding, or uneven heartbeat  If you notice these less serious side effects, talk with your doctor:   · Mild skin redness, itching, burning, or tingling where you wear the patch  · Vivid dreams or trouble sleeping  If you notice other side effects that you think are caused by this medicine, tell your doctor  Call your doctor for medical advice about side effects  You may report side effects to FDA at 5-884-FDA-9535  © 2017 [address] [employer] Information is for End User's use only and may not be sold, redistributed or otherwise used for commercial purposes  The above information is an  only  It is not intended as medical advice for individual conditions or treatments  Talk to your doctor, nurse or pharmacist before following any medical regimen to see if it is safe and effective for you  Pantoprazole (By mouth)   Pantoprazole (pan-TOE-pra-zole)  Treats gastroesophageal reflux disease (GERD), a damaged esophagus, and high levels of stomach acid  This medicine is a proton pump inhibitor (PPI)  Brand Name(s): Protonix   There may be other brand names for this medicine  When This Medicine Should Not Be Used: This medicine is not right for everyone  Do not use it if you had an allergic reaction to pantoprazole or similar medicines  How to Use This Medicine:   Packet, Tablet, Delayed Release Tablet, Long Acting Tablet  · Your doctor will tell you how much medicine to use  Do not use more than directed  Take the medicine at least 30 minutes before a meal   · Delayed-release tablet: Swallow the tablet whole  Do not crush, break, or chew it  · Delayed-release packet:   ¨ To prepare with applesauce:   § Mix the packet contents with 1 teaspoon of applesauce  Do not mix with water, or other liquids or food  Do not divide the packet contents to make smaller doses  § Swallow the mixture within 10 minutes after you mix it  Do not chew or crush the granules  § Sip some water after you take the mixture to make sure you swallow all of the medicine    ¨ To prepare with apple juice:   § Mix the packet contents with 1 teaspoon of apple juice in a small cup  Do not divide the packet contents to make smaller doses  § Stir for 5 seconds and drink the mixture immediately  Do not chew or crush the granules  § To make sure you get all of the medicine, add more apple juice to the cup  Drink it immediately  ¨ To prepare for a feeding tube:   § Pour the packet contents in a 2-ounce (60 milliliter [mL]) catheter-tip syringe  § Add 10 mL of apple juice to the syringe  Add the mixture to the tube  Gently tap or shake the barrel of the syringe to help empty it  § Add 10 mL of apple juice to the syringe and put it in the tube  Do this at least 2 times  There should be no granules left in the syringe  · This medicine should come with a Medication Guide  Ask your pharmacist for a copy if you do not have one  · Missed dose: Take a dose as soon as you remember  If it is almost time for your next dose, wait until then and take a regular dose  Do not take extra medicine to make up for a missed dose  · Store the medicine in a closed container at room temperature, away from heat, moisture, and direct light  Drugs and Foods to Avoid:   Ask your doctor or pharmacist before using any other medicine, including over-the-counter medicines, vitamins, and herbal products  · Some foods and medicines can affect how pantoprazole works  Tell your doctor if you are using any of the following:   ¨ Ampicillin, atazanavir, digoxin, erlotinib, ketoconazole, methotrexate, mycophenolate mofetil, nelfinavir  ¨ Blood thinner (including warfarin)  ¨ Diuretic (water pill)  ¨ Iron supplements  Warnings While Using This Medicine:   · Tell your doctor if you are pregnant or breastfeeding, or if you have liver disease, lupus, or osteoporosis  · This medicine may cause the following problems:  ¨ Kidney problems  ¨ Low vitamin B12 or magnesium levels  ¨ Increased risk of broken bones in the hip, wrist, or spine  · This medicine can cause diarrhea   Call your doctor if the diarrhea becomes severe, does not stop, or is bloody  Do not take any medicine to stop diarrhea until you have talked to your doctor  Diarrhea can occur 2 months or more after you stop taking this medicine  · Tell any doctor or dentist who treats you that you are using this medicine  This medicine may affect certain medical test results  · Your doctor will check your progress and the effects of this medicine at regular visits  Keep all appointments  · Keep all medicine out of the reach of children  Never share your medicine with anyone  Possible Side Effects While Using This Medicine:   Call your doctor right away if you notice any of these side effects:  · Allergic reaction: Itching or hives, swelling in your face or hands, swelling or tingling in your mouth or throat, chest tightness, trouble breathing  · Blistering, peeling, red skin rash  · Fever, joint pain, swelling in your body, unusual weight gain, change in how much or how often you urinate  · Joint pain, rash on your cheeks or arms that gets worse in the sun  · Seizures, dizziness, uneven heartbeat, muscle cramps or twitching  · Severe diarrhea, stomach cramps, fever  · Stomach pain, nausea, vomiting, weight loss  If you notice other side effects that you think are caused by this medicine, tell your doctor  Call your doctor for medical advice about side effects  You may report side effects to FDA at 3-006-FDA-1629  © 2017 [address] [employer] Information is for End User's use only and may not be sold, redistributed or otherwise used for commercial purposes  The above information is an  only  It is not intended as medical advice for individual conditions or treatments  Talk to your doctor, nurse or pharmacist before following any medical regimen to see if it is safe and effective for you  Sucralfate (By mouth)   Sucralfate ([id number]-[id number])  Treats ulcers     Brand Name(s): Carafate   There may be other brand names for this medicine  When This Medicine Should Not Be Used: You should not use this medicine if you have had an allergic reaction to it  How to Use This Medicine:   Tablet, Liquid  · Your doctor will tell you how much to take and how often  · Do not stop taking the medicine unless your doctor tells you to, even if you feel better  · Take your medicine on an empty stomach  Swallow the tablet with a glass of water  · Unless your doctor tells you otherwise, take the medicine 1 hour before meals and at bedtime  · Measure the oral liquid medicine using a measuring spoon or medicine cup  · Shake the oral liquid medicine well before using  If a dose is missed:   · Take your medicine as soon as you remember that you missed your dose  · If it is nearly time for your next dose, wait until then to take your medicine and skip the missed dose  · You should not use two doses at one time  How to Store and Dispose of This Medicine:   · Keep the medicine at room temperature, away from heat, light, and moisture  Do not freeze the oral liquid  · Keep all medicine out of the reach of children  Drugs and Foods to Avoid:   Ask your doctor or pharmacist before using any other medicine, including over-the-counter medicines, vitamins, and herbal products  · Antacids may be taken one-half hour before or after taking sucralfate  · You should not take other medicines within 2 hours before or after taking sucralfate  If you need help deciding the best times to take your other medicines, ask your doctor or pharmacist   Warnings While Using This Medicine:   · If you are pregnant or breastfeeding, talk to your doctor before taking this medicine  · Check with your doctor before taking if you have kidney problems or are on dialysis    Possible Side Effects While Using This Medicine:   Call your doctor right away if you notice any of these side effects:  · Rash, hives, or itching  · Swelling of the face or mouth  If you notice these less serious side effects, talk with your doctor:   · Constipation  · Dry mouth  · Mild stomach cramps, diarrhea  · Upset stomach, gas  · Dizziness  If you notice other side effects that you think are caused by this medicine, tell your doctor  Call your doctor for medical advice about side effects  You may report side effects to FDA at 3-717-FDA-4193  © 2017 [address] [employer]eder Information is for End User's use only and may not be sold, redistributed or otherwise used for commercial purposes  The above information is an  only  It is not intended as medical advice for individual conditions or treatments  Talk to your doctor, nurse or pharmacist before following any medical regimen to see if it is safe and effective for you  Thiamine (Vitamin B-1) (By mouth)   Thiamine (THYE-a-min)  Thiamine, another name for Vitamin B-1, is used to treat thiamine-deficiency (not enough thiamine in the body)  Brand Name(s): [employer] Vitamin B-1, [employer] Vitamin B-1, [facility] Vitamin B-1, [employer] B1   There may be other brand names for this medicine  When This Medicine Should Not Be Used: You should not use this medicine if you have had an allergic reaction to thiamine (Vitamin B-1)  How to Use This Medicine:   Tablet, Capsule, Liquid  · Your doctor will tell you how much and when to take your medicine  Keep all medicine away from children  · You may take your medicine with or without food  · Measure your oral liquid medicine using a marked measuring spoon or medicine cup  If a dose is missed:   · Take the missed dose as soon as possible  · Skip the missed dose if it is almost time for your next dose  · You should not use two doses at the same time  · Missing a dose is generally not a cause for concern  How to Store and Dispose of This Medicine:   · Store the tablets at room temperature in a closed container   Keep away from heat, moisture, and direct light   · Store the oral liquid at room temperature, away from heat and light  Do not freeze  Drugs and Foods to Avoid:   Ask your doctor or pharmacist before using any other medicine, including over-the-counter medicines, vitamins, and herbal products  · Follow your doctor's orders if he or she has given you a special diet  Warnings While Using This Medicine:   · Tell your doctor if you are pregnant or breastfeeding, or if you become pregnant  Possible Side Effects While Using This Medicine:   Call your doctor right away if you notice any of these side effects:  · Itching or trouble breathing  · Swelling of face, lips or eyelids  If you notice other side effects that you think are caused by this medicine, tell your doctor  Call your doctor for medical advice about side effects  You may report side effects to FDA at 4-202-FDA-2885  © 2017 [address] [employer]eder Information is for End User's use only and may not be sold, redistributed or otherwise used for commercial purposes  The above information is an  only  It is not intended as medical advice for individual conditions or treatments  Talk to your doctor, nurse or pharmacist before following any medical regimen to see if it is safe and effective for you  Venlafaxine (By mouth)   Venlafaxine (dac-ew-KWM-een)  Treats depression, generalized anxiety disorder, panic disorder, and social anxiety disorder  Brand Name(s): Effexor XR   There may be other brand names for this medicine  When This Medicine Should Not Be Used: This medicine is not right for everyone  Do not use it if you had an allergic reaction to venlafaxine or desvenlafaxine succinate  How to Use This Medicine:   Long Acting Capsule, Tablet, Long Acting Tablet  · Take your medicine as directed  Your dose may need to be changed several times to find what works best for you    · It is best to take the extended-release capsule at the same time each day (either in the morning or evening)  · It is best to take this medicine with food or milk  · Swallow the extended-release capsule whole  Do not crush, break, or chew it  Do not place the capsule in a liquid  · If you cannot swallow the extended-release capsule, you may open it and pour the medicine into a small amount of soft food such as pudding, yogurt, or applesauce  Stir this mixture well and swallow it without chewing  · This medicine should come with a Medication Guide  Ask your pharmacist for a copy if you do not have one  · Missed dose: Take a dose as soon as you remember  If it is almost time for your next dose, wait until then and take a regular dose  Do not take extra medicine to make up for a missed dose  · Store the medicine in a closed container at room temperature, away from heat, moisture, and direct light  Drugs and Foods to Avoid:   Ask your doctor or pharmacist before using any other medicine, including over-the-counter medicines, vitamins, and herbal products  · Do not use this medicine if you have used an MAO inhibitor within the past 14 days  Do not take an MAO inhibitor for at least 7 days after you stop this medicine  · Some medicines can affect how venlafaxine works  Tell your doctor if you are using any of the following:   ¨ Buspirone, cimetidine, fentanyl, ketoconazole, lithium, metoprolol, mirtazapine, Amena's wort, tramadol, or tryptophan supplements  ¨ Amphetamines  ¨ Blood thinner (including warfarin)  ¨ Diuretic (water pill)  ¨ Medicine for migraine headaches  ¨ Medicine to lose weight (including phentermine)  ¨ NSAID pain or arthritis medicine (including aspirin, celecoxib, diclofenac, ibuprofen, naproxen)  ¨ Tricyclic antidepressant  · Do not drink alcohol while you are using this medicine  · Tell your doctor if you use anything else that makes you sleepy  Some examples are allergy medicine, narcotic pain medicine, and alcohol    Warnings While Using This Medicine:   · Tell your doctor if you are pregnant or breastfeeding, or if you have kidney disease, liver disease, glaucoma, heart disease, high blood pressure, or thyroid problems  Tell your doctor if you have a history of doc, seizures, heart attack, or stroke  · This medicine can increase thoughts of suicide  Tell your doctor right away if you start to feel depressed and have thoughts about hurting yourself  · This medicine may cause the following problems:   ¨ Serotonin syndrome (when used with certain medicines)  ¨ Increased cholesterol levels  ¨ Increased blood pressure  ¨ Increased risk of bleeding problems  ¨ Low sodium levels  ¨ Interstitial lung disease and eosinophilic pneumonia  · This medicine may make you dizzy or drowsy  Do not drive or do anything that could be dangerous until you know how this medicine affects you  · Do not stop using this medicine suddenly  Your doctor will need to slowly decrease your dose before you stop it completely  · Tell any doctor or dentist who treats you that you are using this medicine  This medicine may affect certain medical test results  · Your doctor will do lab tests at regular visits to check on the effects of this medicine  Keep all appointments  · Keep all medicine out of the reach of children  Never share your medicine with anyone    Possible Side Effects While Using This Medicine:   Call your doctor right away if you notice any of these side effects:  · Allergic reaction: Itching or hives, swelling in your face or hands, swelling or tingling in your mouth or throat, chest tightness, trouble breathing  · Anxiety, restlessness, fever, sweating, muscle spasms, nausea, vomiting, diarrhea, seeing or hearing things that are not there  · Blistering, peeling, red skin rash  · Chest pain, cough, trouble breathing  · Confusion, weakness, and muscle twitching  · Eye pain, vision changes, seeing halos around lights  · Fast or pounding heartbeat  · Feeling more excited or energetic than usual  · Headache, trouble concentrating, memory problems, unsteadiness  · Seizures  · Unusual behavior, thoughts of hurting yourself or others, trouble sleeping, nervousness, unusual dreams  · Unusual bleeding or bruising  If you notice these less serious side effects, talk with your doctor:   · Dry mouth  · Mild nausea, constipation, vomiting, loss of appetite, weight loss  · Sexual problems  · Sleepiness or unusual drowsiness, dizziness  If you notice other side effects that you think are caused by this medicine, tell your doctor  Call your doctor for medical advice about side effects  You may report side effects to FDA at 1-410-FDA-8226  © 2017 [address] [employer]eder Information is for End User's use only and may not be sold, redistributed or otherwise used for commercial purposes  The above information is an  only  It is not intended as medical advice for individual conditions or treatments  Talk to your doctor, nurse or pharmacist before following any medical regimen to see if it is safe and effective for you

## 2019-12-24 NOTE — ASSESSMENT & PLAN NOTE
· Mild -> suspect secondary to alcoholic hepatitis  Her Tylenol level x2 were negative      · LFTs are normal today

## 2019-12-24 NOTE — ASSESSMENT & PLAN NOTE
· Resume her recently prescribed all psychiatric medication she has been taking it as prescribed    Seen by Psychiatry and felt that she is not suicidal or homicidal at this time and can go home with recommended psychiatric medication regimen

## 2019-12-24 NOTE — DISCHARGE SUMMARY
Discharge- Lucie  1965, 48 y o  female MRN: [id number]    Unit/Bed#: 7T [facility] 707-02 Encounter: [id number]    Primary Care Provider: YONG Regan   Date and time admitted to hospital: 12/22/2019  5:52 PM        * Alcohol withdrawal syndrome without complication [facility])  Assessment & Plan  · Patient was admitted yesterday going through alcohol withdrawal with CIWA score of 27  Required IV Ativan and oral Librium and Ativan and CIWA protocol  She had had 4 16 oz beers a day prior to admission  · She is doing much better today and her CIWA scores are between 0-5  · Will discharge her home today  She is refusing to go to alcohol rehab and states that she has done it in the past and it did not help her  · I have advised her moderation in drinking and avoiding binge drinking  Intentional acetaminophen overdose ([facility] [address] )  Assessment & Plan  · Patient stated she took a handful of Tylenol p m  Last night her Tylenol level was negative x2  · Was not a suicide attempt as per the patient she just took it for helping her with her pain and to help sleep    Transaminitis  Assessment & Plan  · Mild -> suspect secondary to alcoholic hepatitis  Her Tylenol level x2 were negative  · LFTs are normal today    Bipolar I disorder, most recent episode depressed, severe without psychotic features [facility])  Assessment & Plan  · Resume her recently prescribed all psychiatric medication she has been taking it as prescribed    Seen by Psychiatry and felt that she is not suicidal or homicidal at this time and can go home with recommended psychiatric medication regimen    Leukocytosis  Assessment & Plan  · Suspect reactive due to alcohol withdrawal  · Now resolved    Lactic acidosis  Assessment & Plan  · Secondary to alcohol withdrawal  · Now resolved    Generalized anxiety disorder  Assessment & Plan  · Continue psychiatric medications  · Stable at this time    Tobacco abuse  Assessment & Plan  · Has been counseled on nicotine patch has been provided  Gastritis  Assessment & Plan  · Continue PPI, Carafate    Acquired hypothyroidism  Assessment & Plan  · tsh controlled   continue her Synthroid    Acute hypokalemia  Assessment & Plan  Replace with oral potassium    Essential hypertension  Assessment & Plan  · Blood pressure is well controlled today on current regimen      Discharging Physician / Practitioner: Cristina Hoffman MD  PCP: Aimee Haley, [facility]  Admission Date:   Admission Orders (From admission, onward)     Ordered        12/23/19 1025  Inpatient Admission  Once                   Discharge Date: 12/24/19    Resolved Problems  Date Reviewed: 12/24/2019    None          Consultations During Hospital Stay:  · Psychiatry    Procedures Performed:   · None    Significant Findings / Test Results:   · None    Incidental Findings:   · None     Test Results Pending at Discharge (will require follow up): · None     Outpatient Tests Requested:  · None    Complications:  None    Reason for Admission:  Alcohol withdrawal    Hospital Course:     Anusha Esteves is a 48 y o  female patient who originally presented to the hospital on 12/22/2019 due to alcohol withdrawal and also because she had taken a handful of Tylenol p m  To try to help her sleep  She refused any suicidal or homicidal ideations  She initially had CIWA scores of 27 and was treated with IV Ativan and oral Librium and Ativan oral and now she is much better today  She also had lactic acidosis secondary to alcohol withdrawal which has now resolved  She was offered alcohol rehab services which she refused    The patient, initially admitted to the hospital as inpatient, was discharged earlier than expected given the following: Rapid improved  Please see above list of diagnoses and related plan for additional information       Condition at Discharge: good     Discharge Day Visit / Exam:     Subjective:  Patient denies any chest pain or shortness of breath or abdominal pain   Denies any nausea vomiting or diarrhea  Vitals: Blood Pressure: 135/84 (12/24/19 0720)  Pulse: 63 (12/24/19 0720)  Temperature: (!) 96 6 °F (35 9 °C) (12/24/19 0720)  Temp Source: Temporal (12/24/19 0720)  Respirations: 20 (12/24/19 0720)  Height: 5' 3" (160 cm) (12/23/19 1100)  Weight - Scale: 87 1 kg (192 lb 0 3 oz) (12/22/19 1746)  SpO2: 97 % (12/24/19 0720)  Exam:   Physical Exam   Constitutional: She is oriented to person, place, and time  She appears well-developed and well-nourished  HENT:   Head: Normocephalic and atraumatic  Right Ear: External ear normal    Left Ear: External ear normal    Mouth/Throat: Oropharynx is clear and moist    Eyes: Conjunctivae and EOM are normal    Neck: Normal range of motion  Neck supple  Cardiovascular: Normal rate, regular rhythm and normal heart sounds  Pulmonary/Chest: Effort normal and breath sounds normal    Abdominal: Soft  Bowel sounds are normal  She exhibits no mass  There is no tenderness  There is no rebound and no guarding  Genitourinary:   Genitourinary Comments: deferred   Musculoskeletal: Normal range of motion  Neurological: She is alert and oriented to person, place, and time  She has normal reflexes  Cranial nerves 2-12 are normal   Normal neurological exam   Skin: Skin is warm and dry  No rash noted  Psychiatric: She has a normal mood and affect  Nursing note and vitals reviewed  Discussion with Family:  None    Discharge instructions/Information to patient and family:   See after visit summary for information provided to patient and family  Provisions for Follow-Up Care:  See after visit summary for information related to follow-up care and any pertinent home health orders  Disposition:     Home    For Discharges to [facility]F:   · Not Applicable to this Patient - Not Applicable to this Patient    Planned Readmission:  None     Discharge Statement:  I spent 35 minutes discharging the patient   This time was spent on the day of discharge  I had direct contact with the patient on the day of discharge  Greater than 50% of the total time was spent examining patient, answering all patient questions, arranging and discussing plan of care with patient as well as directly providing post-discharge instructions  Additional time then spent on discharge activities  Discharge Medications:  See after visit summary for reconciled discharge medications provided to patient and family        ** Please Note: This note has been constructed using a voice recognition system **

## 2019-12-24 NOTE — ASSESSMENT & PLAN NOTE
· Patient was admitted yesterday going through alcohol withdrawal with CIWA score of 27  Required IV Ativan and oral Librium and Ativan and CIWA protocol  She had had 4 16 oz beers a day prior to admission  · She is doing much better today and her CIWA scores are between 0-5  · Will discharge her home today  She is refusing to go to alcohol rehab and states that she has done it in the past and it did not help her  · I have advised her moderation in drinking and avoiding binge drinking

## 2019-12-24 NOTE — PLAN OF CARE
Problem: PAIN - ADULT  Goal: Verbalizes/displays adequate comfort level or baseline comfort level  Description  Interventions:  - Encourage patient to monitor pain and request assistance  - Assess pain using appropriate pain scale  - Administer analgesics based on type and severity of pain and evaluate response  - Implement non-pharmacological measures as appropriate and evaluate response  - Consider cultural and social influences on pain and pain management  - Notify physician/advanced practitioner if interventions unsuccessful or patient reports new pain  Outcome: Progressing     Problem: SAFETY ADULT  Goal: Patient will remain free of falls  Description  INTERVENTIONS:  - Assess patient frequently for physical needs  -  Identify cognitive and physical deficits and behaviors that affect risk of falls    -  [place] fall precautions as indicated by assessment   - Educate patient/family on patient safety including physical limitations  - Instruct patient to call for assistance with activity based on assessment  - Modify environment to reduce risk of injury  - Consider OT/PT consult to assist with strengthening/mobility  Outcome: Progressing  Goal: Maintain or return to baseline ADL function  Description  INTERVENTIONS:  -  Assess patient's ability to carry out ADLs; assess patient's baseline for ADL function and identify physical deficits which impact ability to perform ADLs (bathing, care of mouth/teeth, toileting, grooming, dressing, etc )  - Assess/evaluate cause of self-care deficits   - Assess range of motion  - Assess patient's mobility; develop plan if impaired  - Assess patient's need for assistive devices and provide as appropriate  - Encourage maximum independence but intervene and supervise when necessary  - Involve family in performance of ADLs  - Assess for home care needs following discharge   - Consider OT consult to assist with ADL evaluation and planning for discharge  - Provide patient education as appropriate  Outcome: Progressing  Goal: Maintain or return mobility status to optimal level  Description  INTERVENTIONS:  - Assess patient's baseline mobility status (ambulation, transfers, stairs, etc )    - Identify cognitive and physical deficits and behaviors that affect mobility  - Identify mobility aids required to assist with transfers and/or ambulation (gait belt, sit-to-stand, lift, walker, cane, etc )  - [place] fall precautions as indicated by assessment  - Record patient progress and toleration of activity level on Mobility SBAR; progress patient to next Phase/Stage  - Instruct patient to call for assistance with activity based on assessment  - Consider rehabilitation consult to assist with strengthening/weightbearing, etc   Outcome: Progressing     Problem: DISCHARGE PLANNING  Goal: Discharge to home or other facility with appropriate resources  Description  INTERVENTIONS:  - Identify barriers to discharge w/patient and caregiver  - Arrange for needed discharge resources and transportation as appropriate  - Identify discharge learning needs (meds, wound care, etc )  - Arrange for interpretive services to assist at discharge as needed  - Refer to Case Management Department for coordinating discharge planning if the patient needs post-hospital services based on physician/advanced practitioner order or complex needs related to functional status, cognitive ability, or social support system  Outcome: Progressing     Problem: Knowledge Deficit  Goal: Patient/family/caregiver demonstrates understanding of disease process, treatment plan, medications, and discharge instructions  Description  Complete learning assessment and assess knowledge base    Interventions:  - Provide teaching at level of understanding  - Provide teaching via preferred learning methods  Outcome: Progressing     Problem: Potential for Falls  Goal: Patient will remain free of falls  Description  INTERVENTIONS:  - Assess patient frequently for physical needs  -  Identify cognitive and physical deficits and behaviors that affect risk of falls    -  [place] fall precautions as indicated by assessment   - Educate patient/family on patient safety including physical limitations  - Instruct patient to call for assistance with activity based on assessment  - Modify environment to reduce risk of injury  - Consider OT/PT consult to assist with strengthening/mobility  Outcome: Progressing

## 2019-12-24 NOTE — PROGRESS NOTES
[facility]  Psychiatry consultation  Psychiatrist's Progress Note    Patient Name: Kenyatta Yao  MRN: [id number]  DOS: 12/24/19     Chief Complaint:  OTC medication abuse    Interval History: No acute issues overnight  Patient continues to deny suicidal thoughts  She reports feeling better overall - denies hopelessness, worthlessness, anhedonia  Denies withdrawal symptoms  Still does not want rehab  Looking forward to spending holidays with her family  She is aware of her follow up at [facility] and writer reminded her of the exact time and date  She is focused on finding her phone which she seems to have misplaced somewhere between her home and the hospital  Writer offered to involve family in order to enhance support with treatment on discharge but she declined  They are aware she is here  Medication compliant  Adverse effects of medications: denies      Mental Status Exam [Per above +]  Appearance: good grooming/hygiene; no abnormal involuntary movements noted  Behavior: calm, cooperative  Speech: wnl  Mood: denies complaints  Affect: neutral, stable  Thought process: linear, logical  Thought content: no delusions elicited; denies SI/HI  Perceptual disturbances: denies AH/VH  Cognition:  oriented to all domains     Insight: adequate  Judgement: fair      Current Facility-Administered Medications:     chlordiazePOXIDE (LIBRIUM) capsule 25 mg, 25 mg, Oral, Q8H [facility], Phuc Ahumada MD, 25 mg at 12/24/19 0542    cloNIDine (CATAPRES) tablet 0 1 mg, 0 1 mg, Oral, BID, Phuc Ahumada MD, 0 1 mg at 12/24/19 0841    doxepin (SINEquan) capsule 50 mg, 50 mg, Oral, HS, Phuc Ahumada MD, 50 mg at 12/23/19 2216    enoxaparin (LOVENOX) subcutaneous injection 40 mg, 40 mg, Subcutaneous, Q24H [facility], Phuc Ahumada MD, 40 mg at 37/74/71 9878    folic acid (FOLVITE) tablet 1 mg, 1 mg, Oral, Daily, Phuc Ahumada MD, 1 mg at 12/24/19 0841    gabapentin (NEURONTIN) capsule 400 mg, 400 mg, Oral, BID, Konrad Alas MD, 400 mg at 12/24/19 0841    gabapentin (NEURONTIN) capsule 600 mg, 600 mg, Oral, HS, Konrad Alas MD, 600 mg at 12/23/19 2215    lamoTRIgine (LaMICtal) tablet 50 mg, 50 mg, Oral, Daily, Konrad Alas MD, 50 mg at 12/24/19 0841    levothyroxine tablet 50 mcg, 50 mcg, Oral, Early Morning, Konrad Alas MD, 50 mcg at 12/24/19 0542    lisinopril (ZESTRIL) tablet 20 mg, 20 mg, Oral, Daily, Konrad Alas MD, 20 mg at 12/24/19 0841    lurasidone (LATUDA) tablet 80 mg, 80 mg, Oral, Daily With Nithin Ziegler MD, 80 mg at 12/23/19 1540    metoprolol tartrate (LOPRESSOR) tablet 50 mg, 50 mg, Oral, Q12H [address], Konrad Alas MD, 50 mg at 12/24/19 0841    multivitamin-minerals (CENTRUM) tablet 1 tablet, 1 tablet, Oral, Daily, Konrad Alas MD, 1 tablet at 12/24/19 0841    nicotine (NICODERM CQ) 21 mg/24 hr TD 24 hr patch 1 patch, 1 patch, Transdermal, Daily, Konrad Alas MD, 1 patch at 12/24/19 0841    NON FORMULARY, 250 mg, Oral, Daily, Beau Cochran MD    ondansetron [facility]) injection 4 mg, 4 mg, Intravenous, Q6H PRN, Konrad Alas MD    pantoprazole (PROTONIX) EC tablet 40 mg, 40 mg, Oral, Early Morning, Konrad Alas MD, 40 mg at 12/24/19 0543    sucralfate (CARAFATE) tablet 1 g, 1 g, Oral, 4x Daily (AC & HS), Konrad Alas MD, 1 g at 12/24/19 0604    thiamine (VITAMIN B1) tablet 100 mg, 100 mg, Oral, Daily, Konrad Alas MD, 100 mg at 12/24/19 0841    traZODone (DESYREL) tablet 200 mg, 200 mg, Oral, HS, Konrad Alas MD, 200 mg at 12/23/19 2215    venlafaxine (EFFEXOR-XR) 24 hr capsule 225 mg, 225 mg, Oral, Daily, Konrad Alas MD, 225 mg at 12/24/19 0840    Vitals:    12/24/19 0720   BP: 135/84   Pulse: 63   Resp: 20   Temp: (!) 96 6 °F (35 9 °C)   SpO2: 97%       Lab/work up: reviewed    ASSESSMENT:   - substance induced depressive d/o  - r/o PTSD  - alcohol use d/o, severe, dependence  - abuse of nonprescription analgesic  - bipolar d/o unlikely due to limited h/o prolonged sobriety     Recommendations:   1  Disposition: suicidal thoughts were limited to intoxicated state; patient does not meet criteria for involuntary psychiatric treatment; declines rehab  2  Psychopharmacologic interventions:  a  Continue home medications on discharge (doxepin 50mg po qhs, gabapentin 400mg po bid and 600mg po qhs, lamotrigine 50mg po daily, lurasidone 80mg po daily with dinner, trazodone 220mg po qhs, effexor XR 225mg po daily) - no new scripts needed  b  Discussed starting antabuse - nonformulary in the hospital; recommended exploring this with outpatient psychiatrist  3  Outpatient follow up with [facility] already arranged for 12/26/10 at 11am  4  Work-up: none  5   Precautions: no enhanced observation needed    Prachi Bowden MD  12/24/19

## 2020-01-06 ENCOUNTER — HOSPITAL ENCOUNTER (EMERGENCY)
Facility: HOSPITAL | Age: 55
Discharge: HOME | End: 2020-01-06
Attending: EMERGENCY MEDICINE | Admitting: EMERGENCY MEDICINE
Payer: MEDICARE

## 2020-01-06 VITALS
BODY MASS INDEX: 34.76 KG/M2 | DIASTOLIC BLOOD PRESSURE: 87 MMHG | HEART RATE: 87 BPM | WEIGHT: 196.21 LBS | SYSTOLIC BLOOD PRESSURE: 145 MMHG | TEMPERATURE: 98.9 F | OXYGEN SATURATION: 99 % | RESPIRATION RATE: 19 BRPM

## 2020-01-06 DIAGNOSIS — F10.10 ALCOHOL ABUSE: Primary | ICD-10-CM

## 2020-01-06 LAB
AMPHETAMINES SERPL QL SCN: NEGATIVE
BARBITURATES UR QL: NEGATIVE
BENZODIAZ UR QL: NEGATIVE
COCAINE UR QL: NEGATIVE
ETHANOL EXG-MCNC: 0.11 MG/DL
METHADONE UR QL: NEGATIVE
OPIATES UR QL SCN: NEGATIVE
PCP UR QL: NEGATIVE
THC UR QL: NEGATIVE

## 2020-01-06 PROCEDURE — 99284 EMERGENCY DEPT VISIT MOD MDM: CPT

## 2020-01-06 PROCEDURE — 82075 ASSAY OF BREATH ETHANOL: CPT | Performed by: EMERGENCY MEDICINE

## 2020-01-06 PROCEDURE — 99283 EMERGENCY DEPT VISIT LOW MDM: CPT | Performed by: EMERGENCY MEDICINE

## 2020-01-06 PROCEDURE — 80307 DRUG TEST PRSMV CHEM ANLYZR: CPT | Performed by: EMERGENCY MEDICINE

## 2020-01-06 RX ORDER — ATORVASTATIN CALCIUM 40 MG/1
40 TABLET, FILM COATED ORAL DAILY
COMMUNITY
End: 2020-02-17 | Stop reason: HOSPADM

## 2020-01-06 RX ORDER — LAMOTRIGINE 25 MG/1
25 TABLET ORAL
Status: ON HOLD | COMMUNITY
End: 2020-02-11

## 2020-01-06 RX ORDER — ONDANSETRON 4 MG/1
4 TABLET, ORALLY DISINTEGRATING ORAL ONCE
Status: COMPLETED | OUTPATIENT
Start: 2020-01-06 | End: 2020-01-06

## 2020-01-06 RX ORDER — ACETAMINOPHEN 325 MG/1
650 TABLET ORAL ONCE
Status: COMPLETED | OUTPATIENT
Start: 2020-01-06 | End: 2020-01-06

## 2020-01-06 RX ORDER — HYDROXYZINE HYDROCHLORIDE 25 MG/1
25 TABLET, FILM COATED ORAL ONCE
Status: COMPLETED | OUTPATIENT
Start: 2020-01-06 | End: 2020-01-06

## 2020-01-06 RX ORDER — GABAPENTIN 400 MG/1
400 CAPSULE ORAL 3 TIMES DAILY
Status: ON HOLD | COMMUNITY
End: 2020-02-17 | Stop reason: SDUPTHER

## 2020-01-06 RX ORDER — LISINOPRIL AND HYDROCHLOROTHIAZIDE 25; 20 MG/1; MG/1
1 TABLET ORAL DAILY
COMMUNITY
Start: 2019-10-30 | End: 2020-02-17 | Stop reason: HOSPADM

## 2020-01-06 RX ADMIN — ACETAMINOPHEN 650 MG: 325 TABLET ORAL at 19:29

## 2020-01-06 RX ADMIN — HYDROXYZINE HYDROCHLORIDE 25 MG: 25 TABLET ORAL at 20:44

## 2020-01-06 RX ADMIN — ONDANSETRON 4 MG: 4 TABLET, ORALLY DISINTEGRATING ORAL at 21:38

## 2020-01-06 NOTE — ED PROVIDER NOTES
History  Chief Complaint   Patient presents with    Alcohol Problem     pt states she is an alcoholic and wants help  pt tearful  pt reports her last drink was yesterday  47 yo female with a history of anxiety, depression, bipolar disorder, hypertension, hyperlipidemia, and alcohol abuse/dependence presents to the ED requesting placement in a rehab facility  The patient states she has been an alcoholic "for a long time" and would like to seek help to stop drinking  Last drink around 0400 this morning  No tremors, sweats, nausea, or vomiting  She denies SI, HI, and hallucinations  No recent co-ingestions  No other complaints  Prior to Admission Medications   Prescriptions Last Dose Informant Patient Reported?  Taking?   atorvastatin (LIPITOR) 40 mg tablet  Self Yes Yes   Sig: Take 40 mg by mouth daily   cloNIDine (CATAPRES) 0 1 mg tablet   No No   Sig: Take 1 tablet (0 1 mg total) by mouth 2 (two) times a day   doxepin (SINEquan) 50 mg capsule   No No   Sig: Take 1 capsule (50 mg total) by mouth daily at bedtime   folic acid (FOLVITE) 1 mg tablet   No No   Sig: Take 1 tablet (1 mg total) by mouth daily   gabapentin (NEURONTIN) 300 mg capsule   No No   Sig: Take 2 capsules (600 mg total) by mouth daily at bedtime   gabapentin (NEURONTIN) 400 mg capsule  Self Yes Yes   Sig: Take 400 mg by mouth 3 (three) times a day   lamoTRIgine (LaMICtal) 25 mg tablet  Self Yes Yes   Sig: Take 25 mg by mouth daily at bedtime   levothyroxine 50 mcg tablet   No No   Sig: Take 1 tablet (50 mcg total) by mouth daily   lisinopril-hydrochlorothiazide (PRINZIDE,ZESTORETIC) 20-25 MG per tablet   Yes No   Sig: Take 1 tablet by mouth daily   lurasidone (LATUDA) 80 mg tablet   No No   Sig: Take 1 tablet (80 mg total) by mouth daily with dinner   metoprolol tartrate (LOPRESSOR) 50 mg tablet   No No   Sig: Take 1 tablet (50 mg total) by mouth every 12 (twelve) hours   pantoprazole (PROTONIX) 40 mg tablet   No No   Sig: Take 1 tablet (40 mg total) by mouth daily in the early morning   sucralfate (CARAFATE) 1 g tablet   No No   Sig: Take 1 tablet (1 g total) by mouth 4 (four) times a day (before meals and at bedtime)   traZODone (DESYREL) 100 mg tablet   No No   Sig: Take 2 tablets (200 mg total) by mouth daily at bedtime   venlafaxine (EFFEXOR-XR) 75 mg 24 hr capsule   No No   Sig: Take 3 capsules (225 mg total) by mouth daily      Facility-Administered Medications: None       Past Medical History:   Diagnosis Date    Alcoholism ([place] [address] )     Anxiety     Bipolar disorder ([place] [address] )     Bowel obstruction ([place] [address] )     Gastritis     Head injury     Heart palpitations     Hyperlipidemia     Hypertension     Hypothyroidism     PTSD (post-traumatic stress disorder)     Seizure ([place] [address] )     Suicide attempt [facility])        Past Surgical History:   Procedure Laterality Date    ABDOMINAL SURGERY      APPENDECTOMY      BOWEL RESECTION      CHOLECYSTECTOMY      ESOPHAGOGASTRODUODENOSCOPY N/A 5/18/2018    Procedure: ESOPHAGOGASTRODUODENOSCOPY (EGD) with bx;  Surgeon: Ophelia Iraheta DO;  Location: [facility]; Service: Gastroenterology    HYSTERECTOMY      KNEE SURGERY Bilateral        Family History   Problem Relation Age of Onset    Diabetes Father      I have reviewed and agree with the history as documented  Social History     Tobacco Use    Smoking status: Current Every Day Smoker     Packs/day: 1 00     Years: 25 00     Pack years: 25 00     Types: Cigarettes    Smokeless tobacco: Never Used   Substance Use Topics    Alcohol use: Yes     Alcohol/week: 21 0 standard drinks     Types: 21 Cans of beer per week     Frequency: 2-3 times a week     Drinks per session: 3 or 4     Binge frequency: Weekly     Comment: As of 11/14, reports 7 large beers about 2-3 times a week   Drug use: No        Review of Systems   Constitutional: Negative for chills, diaphoresis and fever  HENT: Negative for sore throat  Eyes: Negative for visual disturbance  Respiratory: Negative for shortness of breath  Cardiovascular: Negative for chest pain  Gastrointestinal: Negative for abdominal pain, diarrhea and vomiting  Endocrine: Negative for cold intolerance and heat intolerance  Genitourinary: Negative for dysuria and frequency  Musculoskeletal: Negative for back pain  Skin: Negative for rash  Allergic/Immunologic: Negative for immunocompromised state  Neurological: Negative for tremors, weakness, numbness and headaches  Hematological: Negative for adenopathy  Psychiatric/Behavioral: Negative for self-injury and suicidal ideas  Physical Exam  Physical Exam   Constitutional: She is oriented to person, place, and time  She appears well-developed and well-nourished  No distress  HENT:   Head: Normocephalic and atraumatic  Eyes: Pupils are equal, round, and reactive to light  EOM are normal    Neck: Normal range of motion  Neck supple  Cardiovascular: Normal rate and regular rhythm  Pulmonary/Chest: Effort normal and breath sounds normal    Abdominal: Soft  She exhibits no distension  There is no tenderness  Musculoskeletal: Normal range of motion  She exhibits no edema  Neurological: She is alert and oriented to person, place, and time  Skin: Skin is warm and dry  Psychiatric: She has a normal mood and affect         Vital Signs  ED Triage Vitals   Temperature Pulse Respirations Blood Pressure SpO2   01/06/20 1837 01/06/20 1837 01/06/20 1837 01/06/20 1837 01/06/20 1837   98 9 °F (37 2 °C) (!) 119 18 (!) 199/117 98 %      Temp Source Heart Rate Source Patient Position - Orthostatic VS BP Location FiO2 (%)   01/06/20 1837 01/06/20 1837 01/06/20 1837 01/06/20 1837 --   Tympanic Monitor Sitting Left arm       Pain Score       01/06/20 1929       9           Vitals:    01/06/20 1837 01/06/20 1924 01/06/20 2146   BP: (!) 199/117 140/90 145/87   Pulse: (!) 119 90 87   Patient Position - Orthostatic VS: Sitting Lying Lying         Visual Acuity      ED Medications  Medications   acetaminophen (TYLENOL) tablet 650 mg (650 mg Oral Given 1/6/20 1929)   hydrOXYzine HCL (ATARAX) tablet 25 mg (25 mg Oral Given 1/6/20 2044)   ondansetron (ZOFRAN-ODT) dispersible tablet 4 mg (4 mg Oral Given 1/6/20 2138)       Diagnostic Studies  Results Reviewed     Procedure Component Value Units Date/Time    Rapid drug screen, urine [[id number]]  (Normal) Collected:  01/06/20 1920    Lab Status:  Final result Specimen:  Urine, Clean Catch Updated:  01/06/20 1955     Amph/Meth UR Negative     Barbiturate Ur Negative     Benzodiazepine Urine Negative     Cocaine Urine Negative     Methadone Urine Negative     Opiate Urine Negative     PCP Ur Negative     THC Urine Negative    Narrative:       FOR MEDICAL PURPOSES ONLY  IF CONFIRMATION NEEDED PLEASE CONTACT THE LAB WITHIN 5 DAYS  Drug Screen Cutoff Levels:  AMPHETAMINE/METHAMPHETAMINES  1000 ng/mL  BARBITURATES     200 ng/mL  BENZODIAZEPINES     200 ng/mL  COCAINE      300 ng/mL  METHADONE      300 ng/mL  OPIATES      300 ng/mL  PHENCYCLIDINE     25 ng/mL  THC       50 ng/mL      POCT alcohol breath test [[id number]]  (Normal) Resulted:  01/06/20 1914    Lab Status:  Final result Updated:  01/06/20 1915     EXTBreath Alcohol 0 112                 No orders to display              Procedures  Procedures         ED Course                               MDM  Number of Diagnoses or Management Options  Alcohol abuse:   Diagnosis management comments: The patient is well appearing with stable vital signs and a benign exam  She does not appear grossly intoxicated and shows no signs of alcohol withdrawal  Case discussed with the crisis worker --> HOST will come to the ED to evaluate the patient  Disposition per HOST and crisis worker         Amount and/or Complexity of Data Reviewed  Clinical lab tests: ordered and reviewed    Patient Progress  Patient progress: stable        Disposition  Final diagnoses:   Alcohol abuse     Time reflects when diagnosis was documented in both MDM as applicable and the Disposition within this note     Time User Action Codes Description Comment    1/6/2020  9:45 PM Mitchell Ache Add [F10 20] Alcoholism ([facility] )     1/6/2020  9:45 PM Cheatle, Precilla Cutter [F10 20] Alcoholism ([facility] )     1/6/2020  9:45 PM Mitchell Ache Add [F10 10] Alcohol abuse       ED Disposition     ED Disposition Condition Date/Time Comment    Discharge Stable Mon Jan 6, 2020  9:45 PM Lucie Snider discharge to home/self care              Follow-up Information     Follow up With Specialties Details Why [address], [place] Nurse Practitioner Schedule an appointment as soon as possible for a visit   [address] [facility] [address] [phone]            Discharge Medication List as of 1/6/2020  9:45 PM      CONTINUE these medications which have NOT CHANGED    Details   atorvastatin (LIPITOR) 40 mg tablet Take 40 mg by mouth daily, Historical Med      cloNIDine (CATAPRES) 0 1 mg tablet Take 1 tablet (0 1 mg total) by mouth 2 (two) times a day, Starting Tue 11/19/2019, Print      doxepin (SINEquan) 50 mg capsule Take 1 capsule (50 mg total) by mouth daily at bedtime, Starting Wed 05/55/5046, Normal      folic acid (FOLVITE) 1 mg tablet Take 1 tablet (1 mg total) by mouth daily, Starting Tue 11/19/2019, Print      !! gabapentin (NEURONTIN) 300 mg capsule Take 2 capsules (600 mg total) by mouth daily at bedtime, Starting Wed 12/18/2019, Normal      !! gabapentin (NEURONTIN) 400 mg capsule Take 400 mg by mouth 3 (three) times a day, Historical Med      lamoTRIgine (LaMICtal) 25 mg tablet Take 25 mg by mouth daily at bedtime, Historical Med      levothyroxine 50 mcg tablet Take 1 tablet (50 mcg total) by mouth daily, Starting Tue 11/19/2019, Print      lisinopril-hydrochlorothiazide (PRINZIDE,ZESTORETIC) 20-25 MG per tablet Take 1 tablet by mouth daily, Starting Wed 10/30/2019, Historical Med lurasidone (LATUDA) 80 mg tablet Take 1 tablet (80 mg total) by mouth daily with dinner, Starting Wed 12/18/2019, Normal      metoprolol tartrate (LOPRESSOR) 50 mg tablet Take 1 tablet (50 mg total) by mouth every 12 (twelve) hours, Starting Tue 11/19/2019, Print      pantoprazole (PROTONIX) 40 mg tablet Take 1 tablet (40 mg total) by mouth daily in the early morning, Starting Tue 11/19/2019, Print      sucralfate (CARAFATE) 1 g tablet Take 1 tablet (1 g total) by mouth 4 (four) times a day (before meals and at bedtime), Starting Tue 11/19/2019, Print      traZODone (DESYREL) 100 mg tablet Take 2 tablets (200 mg total) by mouth daily at bedtime, Starting Wed 12/18/2019, Normal      venlafaxine (EFFEXOR-XR) 75 mg 24 hr capsule Take 3 capsules (225 mg total) by mouth daily, Starting Thu 12/19/2019, Normal       !! - Potential duplicate medications found  Please discuss with provider  No discharge procedures on file      ED Provider  Electronically Signed by           Alfredito Hanson MD  01/07/20 3420

## 2020-01-07 NOTE — ED NOTES
Per HOST, patient is currently under review and can be placed from home        Dania Mcginnis  01/06/20 4883

## 2020-01-07 NOTE — ED NOTES
Patient states she is feeling "anxious, can I have something for my anxiety please?"  Atarax given to patient, HOST still at bedside speaking with patient        Yina Stephens  01/06/20 2047

## 2020-01-07 NOTE — ED NOTES
Patient states she had 5 (22oz) bud ice beers  Last drink was around "4 am"  States she is here for help to stop drinking  Patient tearful states "I have a new grandson and I am so embarrassed   I need to stop this already "       Wendy Sears  01/06/20 1948

## 2020-01-08 NOTE — PHYSICIAN ADVISOR
Current patient class: Inpatient  The patient is currently on Hospital Day: 3 at [address]      The patient was admitted to the hospital at 1024 on 12/23/19 for the following diagnosis:  Alcohol withdrawal ([facility] 75 ) [F10 239]  Dehydration [E86 0]  Alcohol intoxication ([facility] 75 ) [F10 929]  Intentional drug overdose ([facility] 75 ) [T50 902A]  Alcohol abuse [F10 10]  Overdose [T50 901A]  Altered mental status [R41 82]  Depression [F32 9]  HTN (hypertension) [I10]       There is documentation in the medical record of an expected length of stay of at least 2 midnights  The patient is therefore expected to satisfy the 2 midnight benchmark and given the 2 midnight presumption is appropriate for INPATIENT ADMISSION  Given this expectation of a satisfying stay, CMS instructs us that the patient is most often appropriate for inpatient admission under part A provided medical necessity is documented in the chart  After review of the relevant documentation, labs, vital signs and test results, the patient is appropriate for INPATIENT ADMISSION  Admission to the hospital as an inpatient is a complex decision making process which requires the practitioner to consider the patients presenting complaint, history and physical examination and all relevant testing  With this in mind, in this case, the patient was deemed appropriate for INPATIENT ADMISSION  After review of the documentation and testing available at the time of the admission I concur with this clinical determination of medical necessity  Rationale is as follows: The patient is a 48 yrs old Female who presented to the ED at 12/22/2019  5:52 PM with a chief complaint of Overdose - Intentional (states took tylenol PM and drinking; states wants to kill self    Just want to die ; States took a lot of medications; states doesn't know why I want to die )     The patient was admitted with leukocytosis, lactic acidosis, alcohol withdrawal syndrome, intentional acetaminophen overdose and transaminitis  The plan of care includes repeat labs, psychiatry consultation, CIWA protocol, advance diet, IV fluids  This patient is appropriate for INPATIENT admission; continued hospitalization is necessary to ensure stabilization of her clinical status  The patients vitals on arrival were ED Triage Vitals   Temperature Pulse Respirations Blood Pressure SpO2   12/22/19 1746 12/22/19 1746 12/22/19 1746 12/22/19 1746 12/22/19 1746   98 6 °F (37 °C) (!) 175 (!) 28 (!) 189/113 97 %      Temp Source Heart Rate Source Patient Position - Orthostatic VS BP Location FiO2 (%)   12/22/19 1746 12/22/19 1746 12/22/19 1746 12/22/19 1829 --   Tympanic Monitor Lying Left arm       Pain Score       12/22/19 1746       No Pain           Past Medical History:   Diagnosis Date    [facility]ism ([facility] )     Anxiety     Bipolar disorder ([facility])     Bowel obstruction ([facility])     Gastritis     Head injury     Heart palpitations     Hyperlipidemia     Hypertension     Hypothyroidism     PTSD (post-traumatic stress disorder)     Seizure ([facility] )     Suicide attempt [facility])      Past Surgical History:   Procedure Laterality Date    ABDOMINAL SURGERY      APPENDECTOMY      BOWEL RESECTION      CHOLECYSTECTOMY      ESOPHAGOGASTRODUODENOSCOPY N/A 5/18/2018    Procedure: ESOPHAGOGASTRODUODENOSCOPY (EGD) with bx;  Surgeon: Briseyda San DO;  Location: [facility];   Service: Gastroenterology    HYSTERECTOMY      KNEE SURGERY Bilateral            Consults have been placed to:   IP CONSULT TO PSYCHIATRY    Vitals:    12/23/19 1501 12/24/19 0140 12/24/19 0540 12/24/19 0720   BP:  145/84 158/98 135/84   BP Location:    Right arm   Pulse:  60 69 63   Resp: 20   20   Temp: (!) 97 °F (36 1 °C)   (!) 96 6 °F (35 9 °C)   TempSrc: Temporal   Temporal   SpO2: 96%   97%   Weight:       Height:           Most recent labs:    No results for input(s): WBC, HGB, HCT, PLT, K, NA, CALCIUM, BUN, CREATININE, LIPASE, AMYLASE, INR, TROPONINI, CKTOTAL, AST, ALT, ALKPHOS, BILITOT in the last 72 hours  Scheduled Meds:  Continuous Infusions:  No current facility-administered medications for this encounter  PRN Meds:      Surgical procedures (if appropriate):

## 2020-01-15 RX ORDER — VENLAFAXINE HYDROCHLORIDE 150 MG/1
CAPSULE, EXTENDED RELEASE ORAL
Qty: 30 CAPSULE | Refills: 0 | OUTPATIENT
Start: 2020-01-15

## 2020-01-16 RX ORDER — TRAZODONE HYDROCHLORIDE 150 MG/1
TABLET ORAL
Qty: 30 TABLET | Refills: 0 | OUTPATIENT
Start: 2020-01-16

## 2020-02-10 ENCOUNTER — HOSPITAL ENCOUNTER (INPATIENT)
Facility: HOSPITAL | Age: 55
LOS: 6 days | Discharge: HOME | DRG: 885 | End: 2020-02-17
Attending: EMERGENCY MEDICINE | Admitting: PSYCHIATRY & NEUROLOGY
Payer: MEDICARE

## 2020-02-10 DIAGNOSIS — F32.A DEPRESSION: ICD-10-CM

## 2020-02-10 DIAGNOSIS — F31.4 BIPOLAR AFFECTIVE DISORDER, DEPRESSED, SEVERE (HCC): ICD-10-CM

## 2020-02-10 DIAGNOSIS — K21.9 GERD (GASTROESOPHAGEAL REFLUX DISEASE): ICD-10-CM

## 2020-02-10 DIAGNOSIS — I10 ESSENTIAL HYPERTENSION: ICD-10-CM

## 2020-02-10 DIAGNOSIS — T78.40XA ALLERGY: ICD-10-CM

## 2020-02-10 DIAGNOSIS — T14.91XA SUICIDE ATTEMPT (HCC): ICD-10-CM

## 2020-02-10 DIAGNOSIS — F31.4 BIPOLAR I DISORDER, MOST RECENT EPISODE DEPRESSED, SEVERE WITHOUT PSYCHOTIC FEATURES (HCC): Primary | Chronic | ICD-10-CM

## 2020-02-10 DIAGNOSIS — F10.20 ALCOHOL USE DISORDER, MODERATE, DEPENDENCE (HCC): Chronic | ICD-10-CM

## 2020-02-10 DIAGNOSIS — F41.1 GENERALIZED ANXIETY DISORDER: Chronic | ICD-10-CM

## 2020-02-10 DIAGNOSIS — E03.9 HYPOTHYROIDISM: ICD-10-CM

## 2020-02-10 DIAGNOSIS — Z00.8 MEDICAL CLEARANCE FOR PSYCHIATRIC ADMISSION: ICD-10-CM

## 2020-02-10 DIAGNOSIS — F41.9 ANXIETY: ICD-10-CM

## 2020-02-10 DIAGNOSIS — K29.70 GASTRITIS WITHOUT BLEEDING, UNSPECIFIED CHRONICITY, UNSPECIFIED GASTRITIS TYPE: ICD-10-CM

## 2020-02-10 DIAGNOSIS — F10.20 ALCOHOL USE DISORDER, SEVERE, DEPENDENCE (HCC): ICD-10-CM

## 2020-02-10 DIAGNOSIS — T50.902A INTENTIONAL DRUG OVERDOSE, INITIAL ENCOUNTER (HCC): ICD-10-CM

## 2020-02-10 DIAGNOSIS — F10.929 ALCOHOL INTOXICATION (HCC): ICD-10-CM

## 2020-02-10 LAB
ALBUMIN SERPL BCP-MCNC: 3.8 G/DL (ref 3–5.2)
ALBUMIN SERPL BCP-MCNC: 4.6 G/DL (ref 3–5.2)
ALP SERPL-CCNC: 116 U/L (ref 43–122)
ALP SERPL-CCNC: 87 U/L (ref 43–122)
ALT SERPL W P-5'-P-CCNC: 75 U/L (ref 9–52)
ALT SERPL W P-5'-P-CCNC: 93 U/L (ref 9–52)
AMMONIA PLAS-SCNC: <9 UMOL/L (ref 9–33)
AMPHETAMINES SERPL QL SCN: NEGATIVE
ANION GAP SERPL CALCULATED.3IONS-SCNC: 10 MMOL/L (ref 5–14)
ANION GAP SERPL CALCULATED.3IONS-SCNC: 13 MMOL/L (ref 5–14)
ANION GAP SERPL CALCULATED.3IONS-SCNC: 9 MMOL/L (ref 5–14)
APAP SERPL-MCNC: <10 UG/ML (ref 10–20)
APTT PPP: 25 SECONDS (ref 25–32)
AST SERPL W P-5'-P-CCNC: 55 U/L (ref 14–36)
AST SERPL W P-5'-P-CCNC: 77 U/L (ref 14–36)
BARBITURATES UR QL: NEGATIVE
BASOPHILS # BLD AUTO: 0.1 THOUSANDS/ΜL (ref 0–0.1)
BASOPHILS NFR BLD AUTO: 1 % (ref 0–1)
BENZODIAZ UR QL: NEGATIVE
BILIRUB SERPL-MCNC: 0.3 MG/DL
BILIRUB SERPL-MCNC: 0.4 MG/DL
BUN SERPL-MCNC: 11 MG/DL (ref 5–25)
CALCIUM SERPL-MCNC: 8.5 MG/DL (ref 8.4–10.2)
CALCIUM SERPL-MCNC: 9 MG/DL (ref 8.4–10.2)
CALCIUM SERPL-MCNC: 9.6 MG/DL (ref 8.4–10.2)
CHLORIDE SERPL-SCNC: 101 MMOL/L (ref 97–108)
CHLORIDE SERPL-SCNC: 106 MMOL/L (ref 97–108)
CHLORIDE SERPL-SCNC: 98 MMOL/L (ref 97–108)
CO2 SERPL-SCNC: 25 MMOL/L (ref 22–30)
CO2 SERPL-SCNC: 26 MMOL/L (ref 22–30)
CO2 SERPL-SCNC: 27 MMOL/L (ref 22–30)
COCAINE UR QL: NEGATIVE
CREAT SERPL-MCNC: 0.57 MG/DL (ref 0.6–1.2)
CREAT SERPL-MCNC: 0.68 MG/DL (ref 0.6–1.2)
CREAT SERPL-MCNC: 0.84 MG/DL (ref 0.6–1.2)
EOSINOPHIL # BLD AUTO: 0 THOUSAND/ΜL (ref 0–0.4)
EOSINOPHIL NFR BLD AUTO: 0 % (ref 0–6)
ERYTHROCYTE [DISTWIDTH] IN BLOOD BY AUTOMATED COUNT: 14.7 %
ETHANOL SERPL-MCNC: 178 MG/DL (ref 0–10)
EXT PREG TEST URINE: NEGATIVE
EXT. CONTROL ED NAV: NORMAL
GFR SERPL CREATININE-BSD FRML MDRD: 101 ML/MIN/1.73SQ M
GFR SERPL CREATININE-BSD FRML MDRD: 107 ML/MIN/1.73SQ M
GFR SERPL CREATININE-BSD FRML MDRD: 80 ML/MIN/1.73SQ M
GLUCOSE SERPL-MCNC: 132 MG/DL (ref 70–99)
GLUCOSE SERPL-MCNC: 146 MG/DL (ref 70–99)
GLUCOSE SERPL-MCNC: 151 MG/DL (ref 70–99)
HCT VFR BLD AUTO: 41.3 % (ref 36–46)
HGB BLD-MCNC: 13.9 G/DL (ref 12–16)
INR PPP: 0.86 (ref 0.91–1.09)
LYMPHOCYTES # BLD AUTO: 2.6 THOUSANDS/ΜL (ref 0.5–4)
LYMPHOCYTES NFR BLD AUTO: 24 % (ref 25–45)
MCH RBC QN AUTO: 31.5 PG (ref 26–34)
MCHC RBC AUTO-ENTMCNC: 33.7 G/DL (ref 31–36)
MCV RBC AUTO: 94 FL (ref 80–100)
METHADONE UR QL: NEGATIVE
MONOCYTES # BLD AUTO: 0.9 THOUSAND/ΜL (ref 0.2–0.9)
MONOCYTES NFR BLD AUTO: 8 % (ref 1–10)
NEUTROPHILS # BLD AUTO: 7.5 THOUSANDS/ΜL (ref 1.8–7.8)
NEUTS SEG NFR BLD AUTO: 67 % (ref 45–65)
OPIATES UR QL SCN: NEGATIVE
PCP UR QL: NEGATIVE
PLATELET # BLD AUTO: 290 THOUSANDS/UL (ref 150–450)
PMV BLD AUTO: 7.6 FL (ref 8.9–12.7)
POTASSIUM SERPL-SCNC: 3.3 MMOL/L (ref 3.6–5)
POTASSIUM SERPL-SCNC: 4 MMOL/L (ref 3.6–5)
POTASSIUM SERPL-SCNC: 4.9 MMOL/L (ref 3.6–5)
PROT SERPL-MCNC: 6.8 G/DL (ref 5.9–8.4)
PROT SERPL-MCNC: 7.8 G/DL (ref 5.9–8.4)
PROTHROMBIN TIME: 9.6 SECONDS (ref 9.8–12)
RBC # BLD AUTO: 4.41 MILLION/UL (ref 4–5.2)
SALICYLATES SERPL-MCNC: <1 MG/DL (ref 10–30)
SODIUM SERPL-SCNC: 137 MMOL/L (ref 137–147)
SODIUM SERPL-SCNC: 138 MMOL/L (ref 137–147)
SODIUM SERPL-SCNC: 140 MMOL/L (ref 137–147)
THC UR QL: NEGATIVE
TSH SERPL DL<=0.05 MIU/L-ACNC: 2.47 UIU/ML (ref 0.47–4.68)
WBC # BLD AUTO: 11.1 THOUSAND/UL (ref 4.5–11)

## 2020-02-10 PROCEDURE — 96360 HYDRATION IV INFUSION INIT: CPT

## 2020-02-10 PROCEDURE — 82140 ASSAY OF AMMONIA: CPT | Performed by: EMERGENCY MEDICINE

## 2020-02-10 PROCEDURE — 81025 URINE PREGNANCY TEST: CPT | Performed by: EMERGENCY MEDICINE

## 2020-02-10 PROCEDURE — 84443 ASSAY THYROID STIM HORMONE: CPT | Performed by: EMERGENCY MEDICINE

## 2020-02-10 PROCEDURE — 80053 COMPREHEN METABOLIC PANEL: CPT | Performed by: EMERGENCY MEDICINE

## 2020-02-10 PROCEDURE — 80320 DRUG SCREEN QUANTALCOHOLS: CPT | Performed by: EMERGENCY MEDICINE

## 2020-02-10 PROCEDURE — 99291 CRITICAL CARE FIRST HOUR: CPT | Performed by: EMERGENCY MEDICINE

## 2020-02-10 PROCEDURE — 80307 DRUG TEST PRSMV CHEM ANLYZR: CPT | Performed by: EMERGENCY MEDICINE

## 2020-02-10 PROCEDURE — 93005 ELECTROCARDIOGRAM TRACING: CPT

## 2020-02-10 PROCEDURE — 80329 ANALGESICS NON-OPIOID 1 OR 2: CPT | Performed by: EMERGENCY MEDICINE

## 2020-02-10 PROCEDURE — 85610 PROTHROMBIN TIME: CPT | Performed by: EMERGENCY MEDICINE

## 2020-02-10 PROCEDURE — 99285 EMERGENCY DEPT VISIT HI MDM: CPT

## 2020-02-10 PROCEDURE — 85730 THROMBOPLASTIN TIME PARTIAL: CPT | Performed by: EMERGENCY MEDICINE

## 2020-02-10 PROCEDURE — 99449 NTRPROF PH1/NTRNET/EHR 31/>: CPT | Performed by: EMERGENCY MEDICINE

## 2020-02-10 PROCEDURE — 85025 COMPLETE CBC W/AUTO DIFF WBC: CPT | Performed by: EMERGENCY MEDICINE

## 2020-02-10 PROCEDURE — 80048 BASIC METABOLIC PNL TOTAL CA: CPT | Performed by: EMERGENCY MEDICINE

## 2020-02-10 PROCEDURE — 96361 HYDRATE IV INFUSION ADD-ON: CPT

## 2020-02-10 PROCEDURE — 36415 COLL VENOUS BLD VENIPUNCTURE: CPT | Performed by: EMERGENCY MEDICINE

## 2020-02-10 RX ADMIN — SODIUM CHLORIDE 1000 ML: 0.9 INJECTION, SOLUTION INTRAVENOUS at 19:04

## 2020-02-10 NOTE — ED PROVIDER NOTES
History  Chief Complaint   Patient presents with    Overdose - Intentional     pt states she took about 25 trazadone 20 minutes ago in an attempt to harm herself     HPI      This is a very pleasant but clinically depressed 57-year-old female presents the emergency department with an intentional overdose  Patient took an entire bottle of trazodone 100 mg tablets in an attempt to harm herself  Patient would not elaborate as to why she wants to hurt herself  She said it is the same old story  Patient also consumed a large amount of alcohol to wash the pills down  Prior to Admission Medications   Prescriptions Last Dose Informant Patient Reported?  Taking?   atorvastatin (LIPITOR) 40 mg tablet  Self Yes No   Sig: Take 40 mg by mouth daily   cloNIDine (CATAPRES) 0 1 mg tablet   No No   Sig: Take 1 tablet (0 1 mg total) by mouth 2 (two) times a day   doxepin (SINEquan) 50 mg capsule   No No   Sig: Take 1 capsule (50 mg total) by mouth daily at bedtime   folic acid (FOLVITE) 1 mg tablet   No No   Sig: Take 1 tablet (1 mg total) by mouth daily   gabapentin (NEURONTIN) 300 mg capsule   No No   Sig: Take 2 capsules (600 mg total) by mouth daily at bedtime   gabapentin (NEURONTIN) 400 mg capsule  Self Yes No   Sig: Take 400 mg by mouth 3 (three) times a day   lamoTRIgine (LaMICtal) 25 mg tablet  Self Yes No   Sig: Take 25 mg by mouth daily at bedtime   levothyroxine 50 mcg tablet   No No   Sig: Take 1 tablet (50 mcg total) by mouth daily   lisinopril-hydrochlorothiazide (PRINZIDE,ZESTORETIC) 20-25 MG per tablet   Yes No   Sig: Take 1 tablet by mouth daily   lurasidone (LATUDA) 80 mg tablet   No No   Sig: Take 1 tablet (80 mg total) by mouth daily with dinner   metoprolol tartrate (LOPRESSOR) 50 mg tablet   No No   Sig: Take 1 tablet (50 mg total) by mouth every 12 (twelve) hours   pantoprazole (PROTONIX) 40 mg tablet   No No   Sig: Take 1 tablet (40 mg total) by mouth daily in the early morning   sucralfate (CARAFATE) 1 g tablet   No No   Sig: Take 1 tablet (1 g total) by mouth 4 (four) times a day (before meals and at bedtime)   traZODone (DESYREL) 100 mg tablet   No No   Sig: Take 2 tablets (200 mg total) by mouth daily at bedtime   venlafaxine (EFFEXOR-XR) 75 mg 24 hr capsule   No No   Sig: Take 3 capsules (225 mg total) by mouth daily      Facility-Administered Medications: None       Past Medical History:   Diagnosis Date    Alcoholism ([place] [address] )     Anxiety     Bipolar disorder ([place] [address] )     Bowel obstruction ([place] [address] )     Gastritis     Head injury     Heart palpitations     Hyperlipidemia     Hypertension     Hypothyroidism     PTSD (post-traumatic stress disorder)     Seizure ([place] [address] )     Suicide attempt [facility])        Past Surgical History:   Procedure Laterality Date    ABDOMINAL SURGERY      APPENDECTOMY      BOWEL RESECTION      CHOLECYSTECTOMY      ESOPHAGOGASTRODUODENOSCOPY N/A 5/18/2018    Procedure: ESOPHAGOGASTRODUODENOSCOPY (EGD) with bx;  Surgeon: Marni Christine DO;  Location: [facility]; Service: Gastroenterology    HYSTERECTOMY      KNEE SURGERY Bilateral        Family History   Problem Relation Age of Onset    Diabetes Father      I have reviewed and agree with the history as documented  Social History     Tobacco Use    Smoking status: Current Every Day Smoker     Packs/day: 1 00     Years: 25 00     Pack years: 25 00     Types: Cigarettes    Smokeless tobacco: Never Used   Substance Use Topics    Alcohol use: Yes     Alcohol/week: 21 0 standard drinks     Types: 21 Cans of beer per week     Frequency: 2-3 times a week     Drinks per session: 3 or 4     Binge frequency: Weekly     Comment: As of 11/14, reports 7 large beers about 2-3 times a week   Drug use: No        Review of Systems   Constitutional: Negative  HENT: Negative  Eyes: Negative  Respiratory: Negative  Cardiovascular: Negative  Gastrointestinal: Negative  Endocrine: Negative  Genitourinary: Negative  Musculoskeletal: Negative  Skin: Negative  Allergic/Immunologic: Negative  Neurological: Negative  Hematological: Negative  Psychiatric/Behavioral: Positive for sleep disturbance and suicidal ideas  The patient is nervous/anxious  Physical Exam  Physical Exam   Constitutional: She is oriented to person, place, and time  She appears well-developed and well-nourished  HENT:   Head: Normocephalic and atraumatic  Right Ear: External ear normal    Left Ear: External ear normal    Nose: Nose normal    Mouth/Throat: Oropharynx is clear and moist    Eyes: Pupils are equal, round, and reactive to light  Conjunctivae and EOM are normal    Neck: Normal range of motion  Neck supple  Cardiovascular: Normal rate, regular rhythm, normal heart sounds and intact distal pulses  Pulmonary/Chest: Effort normal and breath sounds normal    Abdominal: Soft  Bowel sounds are normal    Genitourinary:   Genitourinary Comments: Exam deferred  Musculoskeletal: Normal range of motion  Neurological: She is alert and oriented to person, place, and time  Skin: Skin is warm and dry  Capillary refill takes less than 2 seconds  Psychiatric: She has a normal mood and affect  Her behavior is normal    Nursing note and vitals reviewed        Vital Signs  ED Triage Vitals [02/10/20 1736]   Temp Pulse Respirations Blood Pressure SpO2   -- (!) 110 16 143/97 95 %      Temp src Heart Rate Source Patient Position - Orthostatic VS BP Location FiO2 (%)   -- Monitor Sitting Left arm --      Pain Score       --           Vitals:    02/10/20 1736 02/10/20 1900 02/10/20 1915   BP: 143/97     Pulse: (!) 110 99 103   Patient Position - Orthostatic VS: Sitting Lying          Visual Acuity      ED Medications  Medications   sodium chloride 0 9 % bolus 1,000 mL (1,000 mL Intravenous New Bag 2/10/20 1904)       Diagnostic Studies  Results Reviewed     Procedure Component Value Units Date/Time    Rapid drug screen, urine [[id number]] (Normal) Collected:  02/10/20 1833    Lab Status:  Final result Specimen:  Urine, Clean Catch Updated:  02/10/20 1915     Amph/Meth UR Negative     Barbiturate Ur Negative     Benzodiazepine Urine Negative     Cocaine Urine Negative     Methadone Urine Negative     Opiate Urine Negative     PCP Ur Negative     THC Urine Negative    Narrative:       FOR MEDICAL PURPOSES ONLY  IF CONFIRMATION NEEDED PLEASE CONTACT THE LAB WITHIN 5 DAYS  Drug Screen Cutoff Levels:  AMPHETAMINE/METHAMPHETAMINES  1000 ng/mL  BARBITURATES     200 ng/mL  BENZODIAZEPINES     200 ng/mL  COCAINE      300 ng/mL  METHADONE      300 ng/mL  OPIATES      300 ng/mL  PHENCYCLIDINE     25 ng/mL  THC       50 ng/mL      Basic metabolic panel [[id number]]     Lab Status:  No result Specimen:  Blood     TSH [[id number]]  (Normal) Collected:  02/10/20 1802    Lab Status:  Final result Specimen:  Blood from Arm, Right Updated:  02/10/20 1852     TSH 3RD GENERATON 2 470 uIU/mL     Narrative:       Patients undergoing fluorescein dye angiography may retain small amounts of fluorescein in the body for 48-72 hours post procedure  Samples containing fluorescein can produce falsely depressed TSH values  If the patient had this procedure,a specimen should be resubmitted post fluorescein clearance        POCT pregnancy, urine [[id number]]  (Normal) Resulted:  02/10/20 1837    Lab Status:  Final result Updated:  02/10/20 1837     EXT PREG TEST UR (Ref: Negative) negative     Control valid    Salicylate level [[id number]]  (Abnormal) Collected:  02/10/20 1802    Lab Status:  Final result Specimen:  Blood from Arm, Right Updated:  82/85/84 4434     Salicylate Lvl <9 0 mg/dL     Acetaminophen level-"If concentration is detectable, please discuss with medical  on call " [[id number]]  (Abnormal) Collected:  02/10/20 1802    Lab Status:  Final result Specimen:  Blood from Arm, Right Updated:  02/10/20 1827     Acetaminophen Level <10 ug/mL     Protime-INR [[id number]]  (Abnormal) Collected:  02/10/20 1802    Lab Status:  Final result Specimen:  Blood from Arm, Right Updated:  02/10/20 1827     Protime 9 6 seconds      INR 0 86    APTT [[id number]]  (Normal) Collected:  02/10/20 1802    Lab Status:  Final result Specimen:  Blood from Arm, Right Updated:  02/10/20 1827     PTT 25 seconds     Ethanol [[id number]]  (Abnormal) Collected:  02/10/20 1802    Lab Status:  Final result Specimen:  Blood from Arm, Right Updated:  02/10/20 1826     Ethanol Lvl 178 mg/dL     Ammonia [[id number]]  (Abnormal) Collected:  02/10/20 1802    Lab Status:  Final result Specimen:  Blood from Arm, Right Updated:  02/10/20 1824     Ammonia <9 umol/L     Comprehensive metabolic panel [[id number]]  (Abnormal) Collected:  02/10/20 1802    Lab Status:  Final result Specimen:  Blood from Arm, Right Updated:  02/10/20 1823     Sodium 137 mmol/L      Potassium 3 3 mmol/L      Chloride 98 mmol/L      CO2 26 mmol/L      ANION GAP 13 mmol/L      BUN 11 mg/dL      Creatinine 0 84 mg/dL      Glucose 151 mg/dL      Calcium 9 6 mg/dL      AST 77 U/L      ALT 93 U/L      Alkaline Phosphatase 116 U/L      Total Protein 7 8 g/dL      Albumin 4 6 g/dL      Total Bilirubin 0 40 mg/dL      eGFR 80 ml/min/1 73sq m     Narrative:       [place] guidelines for Chronic Kidney Disease (CKD):     Stage 1 with normal or high GFR (GFR > 90 mL/min/1 73 square meters)    Stage 2 Mild CKD (GFR = 60-89 mL/min/1 73 square meters)    Stage 3A Moderate CKD (GFR = 45-59 mL/min/1 73 square meters)    Stage 3B Moderate CKD (GFR = 30-44 mL/min/1 73 square meters)    Stage 4 Severe CKD (GFR = 15-29 mL/min/1 73 square meters)    Stage 5 End Stage CKD (GFR <15 mL/min/1 73 square meters)  Note: GFR calculation is accurate only with a steady state creatinine    CBC and differential [[id number]]  (Abnormal) Collected:  02/10/20 1802    Lab Status:  Final result Specimen:  Blood from Arm, Right Updated: 02/10/20 1815     WBC 11 10 Thousand/uL      RBC 4 41 Million/uL      Hemoglobin 13 9 g/dL      Hematocrit 41 3 %      MCV 94 fL      MCH 31 5 pg      MCHC 33 7 g/dL      RDW 14 7 %      MPV 7 6 fL      Platelets 488 Thousands/uL      Neutrophils Relative 67 %      Lymphocytes Relative 24 %      Monocytes Relative 8 %      Eosinophils Relative 0 %      Basophils Relative 1 %      Neutrophils Absolute 7 50 Thousands/µL      Lymphocytes Absolute 2 60 Thousands/µL      Monocytes Absolute 0 90 Thousand/µL      Eosinophils Absolute 0 00 Thousand/µL      Basophils Absolute 0 10 Thousands/µL                  No orders to display              Procedures  ECG 12 Lead Documentation Only  Date/Time: 2/10/2020 6:10 PM  Performed by: Peng Kevin III, DO  Authorized by: Peng Kevin III, DO     Indications / Diagnosis:  Overdose  ECG reviewed by me, the ED Provider: yes    Patient location:  ED  Comments:      I personally reviewed this EKG is performed the patient February 10, 2020  EKG was completed at 6:07 p m  And interpreted by me at 6:10 p m   Sinus rhythm with a first-degree AV block with no acute ST abnormalities  CriticalCare Time  Performed by: Mariano Coates DO  Authorized by: Peng Kevin III, DO     Critical care provider statement:     Critical care start time:  2/10/2020 6:33 PM    Critical care end time:  2/10/2020 7:35 PM    Critical care was necessary to treat or prevent imminent or life-threatening deterioration of the following conditions:  Toxidrome, shock and CNS failure or compromise    Critical care was time spent personally by me on the following activities:  Discussions with consultants  Comments:      82-year-old female presents emergency department with an intentional overdose of trazodone  Case was consulted with toxicology and poison Control down in [place]  Patient will need to be observed closely for airway compromise and seizures               ED Course  ED Course as of Feb 10 1939   Mon Feb 10, 2020   1850 Case discussed at length with [address] Control at [facility]  Recommended 6 hours of observation from time of ingestion rounding up that will be at 12 midnight  1859 Case discussed with Lisa Flores DO, network why  on-call  Agreed with poison control's recommendations  He added that the patient has a isolated transaminitis which we should proceed with repeating the blood work at approximately 8:00 a m  Julian TRINH  Number of Diagnoses or Management Options  Alcohol intoxication ([facility] [address] ):   Depression:   Intentional drug overdose, initial encounter [facility]):   Suicide attempt [facility]):   Diagnosis management comments: This is a unfortunate 17-year-old female presents the emergency department is visibly intoxicated depressed anxious with a intentional overdose of trazodone 25 tablets at approximately 5:15 p m     The dosage of the tablets were 100 mg each  Case discussed with Toxicology from [facility] and also poison control of [place] at [facility] with the recommendation that the patient will need to be observed until 12 midnight patient does have an isolated transaminitis which he has a history of the wanted those blood work to be repeated approximately 8:00 p m  If they are not significantly changed then the patient could be reassessed at midnight if she is not somnolent and alcohols cleared below 80 then the patient could be medically cleared for psychiatric evaluation  Portions of the record may have been created with voice recognition software  Occasional wrong word or "sound a like" substitutions may have occurred due to the inherent limitations of voice recognition software  Read the chart carefully and recognize, using context, where substitutions have occurred         Amount and/or Complexity of Data Reviewed  Clinical lab tests: ordered and reviewed  Tests in the medicine section of CPT®: ordered and reviewed  Decide to obtain previous medical records or to obtain history from someone other than the patient: yes  Review and summarize past medical records: yes  Discuss the patient with other providers: yes  Independent visualization of images, tracings, or specimens: yes    Risk of Complications, Morbidity, and/or Mortality  Presenting problems: high  Diagnostic procedures: moderate  Management options: moderate          Disposition  Final diagnoses:   Intentional drug overdose, initial encounter ([facility] )   Suicide attempt ([place] [address] )   Alcohol intoxication ([place] [address] )   Depression     Time reflects when diagnosis was documented in both MDM as applicable and the Disposition within this note     Time User Action Codes Description Comment    2/10/2020  7:36 PM Rasheed Moles Add [T50 902A] Intentional drug overdose, initial encounter [facility])     2/10/2020  7:36 PM Rasheed Moles Add [T14 91XA] Suicide attempt ([facility] )     2/10/2020  7:36 PM Rasheed Moles Add [F10 929] Alcohol intoxication ([place] [address] )     2/10/2020  7:36 PM Rasheed Moles Add [F32 9] Depression       ED Disposition     None      Follow-up Information    None         Patient's Medications   Discharge Prescriptions    No medications on file     No discharge procedures on file      ED Provider  Electronically Signed by           Dell Mclean III, DO  02/10/20 5663

## 2020-02-10 NOTE — ED NOTES
Pt requested sanitary napkin and was assisted to wear the sanitary napkin  Patient advised ED staff that the purpose of the sanitary napkin was for slight amounts of urine produced upon coughing or sneezing  Patient was offered a brief but declined         Caren Toussaint  02/10/20 2360

## 2020-02-11 PROBLEM — Z00.8 MEDICAL CLEARANCE FOR PSYCHIATRIC ADMISSION: Status: ACTIVE | Noted: 2020-02-11

## 2020-02-11 LAB
ATRIAL RATE: 84 BPM
ATRIAL RATE: 94 BPM
CHOLEST SERPL-MCNC: 168 MG/DL
HDLC SERPL-MCNC: 58 MG/DL
LDLC SERPL CALC-MCNC: 76 MG/DL
NONHDLC SERPL-MCNC: 110 MG/DL
P AXIS: 43 DEGREES
P AXIS: 60 DEGREES
PR INTERVAL: 184 MS
PR INTERVAL: 240 MS
QRS AXIS: 29 DEGREES
QRS AXIS: 6 DEGREES
QRSD INTERVAL: 68 MS
QRSD INTERVAL: 76 MS
QT INTERVAL: 370 MS
QT INTERVAL: 398 MS
QTC INTERVAL: 462 MS
QTC INTERVAL: 470 MS
T WAVE AXIS: 21 DEGREES
T WAVE AXIS: 4 DEGREES
TRIGL SERPL-MCNC: 168 MG/DL
VENTRICULAR RATE: 84 BPM
VENTRICULAR RATE: 94 BPM

## 2020-02-11 PROCEDURE — 99222 1ST HOSP IP/OBS MODERATE 55: CPT | Performed by: PSYCHIATRY & NEUROLOGY

## 2020-02-11 PROCEDURE — 99222 1ST HOSP IP/OBS MODERATE 55: CPT | Performed by: NURSE PRACTITIONER

## 2020-02-11 PROCEDURE — 93005 ELECTROCARDIOGRAM TRACING: CPT

## 2020-02-11 PROCEDURE — 93010 ELECTROCARDIOGRAM REPORT: CPT | Performed by: INTERNAL MEDICINE

## 2020-02-11 PROCEDURE — 86592 SYPHILIS TEST NON-TREP QUAL: CPT | Performed by: PSYCHIATRY & NEUROLOGY

## 2020-02-11 PROCEDURE — 80061 LIPID PANEL: CPT | Performed by: PSYCHIATRY & NEUROLOGY

## 2020-02-11 RX ORDER — GABAPENTIN 400 MG/1
400 CAPSULE ORAL 3 TIMES DAILY
Status: DISCONTINUED | OUTPATIENT
Start: 2020-02-11 | End: 2020-02-12

## 2020-02-11 RX ORDER — PANTOPRAZOLE SODIUM 40 MG/1
40 TABLET, DELAYED RELEASE ORAL
Status: DISCONTINUED | OUTPATIENT
Start: 2020-02-11 | End: 2020-02-17 | Stop reason: HOSPADM

## 2020-02-11 RX ORDER — THIAMINE MONONITRATE (VIT B1) 100 MG
100 TABLET ORAL DAILY
Status: DISCONTINUED | OUTPATIENT
Start: 2020-02-11 | End: 2020-02-17 | Stop reason: HOSPADM

## 2020-02-11 RX ORDER — IBUPROFEN 400 MG/1
800 TABLET ORAL EVERY 8 HOURS PRN
Status: DISCONTINUED | OUTPATIENT
Start: 2020-02-11 | End: 2020-02-17 | Stop reason: HOSPADM

## 2020-02-11 RX ORDER — ZOLPIDEM TARTRATE 5 MG/1
5 TABLET ORAL
Status: DISCONTINUED | OUTPATIENT
Start: 2020-02-11 | End: 2020-02-17 | Stop reason: HOSPADM

## 2020-02-11 RX ORDER — RISPERIDONE 1 MG/1
1 TABLET, ORALLY DISINTEGRATING ORAL
Status: DISCONTINUED | OUTPATIENT
Start: 2020-02-11 | End: 2020-02-17 | Stop reason: HOSPADM

## 2020-02-11 RX ORDER — CLONIDINE HYDROCHLORIDE 0.1 MG/1
0.1 TABLET ORAL 2 TIMES DAILY
Status: DISCONTINUED | OUTPATIENT
Start: 2020-02-11 | End: 2020-02-17 | Stop reason: HOSPADM

## 2020-02-11 RX ORDER — IBUPROFEN 400 MG/1
400 TABLET ORAL EVERY 8 HOURS PRN
Status: DISCONTINUED | OUTPATIENT
Start: 2020-02-11 | End: 2020-02-17 | Stop reason: HOSPADM

## 2020-02-11 RX ORDER — BENZTROPINE MESYLATE 1 MG/ML
1 INJECTION INTRAMUSCULAR; INTRAVENOUS EVERY 6 HOURS PRN
Status: DISCONTINUED | OUTPATIENT
Start: 2020-02-11 | End: 2020-02-17 | Stop reason: HOSPADM

## 2020-02-11 RX ORDER — ZIPRASIDONE HYDROCHLORIDE 20 MG/1
20 CAPSULE ORAL EVERY 4 HOURS PRN
Status: DISCONTINUED | OUTPATIENT
Start: 2020-02-11 | End: 2020-02-17 | Stop reason: HOSPADM

## 2020-02-11 RX ORDER — VENLAFAXINE HYDROCHLORIDE 75 MG/1
75 CAPSULE, EXTENDED RELEASE ORAL DAILY
Status: DISCONTINUED | OUTPATIENT
Start: 2020-02-11 | End: 2020-02-12

## 2020-02-11 RX ORDER — LORAZEPAM 2 MG/ML
1 INJECTION INTRAMUSCULAR EVERY 6 HOURS PRN
Status: DISCONTINUED | OUTPATIENT
Start: 2020-02-11 | End: 2020-02-17 | Stop reason: HOSPADM

## 2020-02-11 RX ORDER — HALOPERIDOL 5 MG
5 TABLET ORAL EVERY 8 HOURS PRN
Status: DISCONTINUED | OUTPATIENT
Start: 2020-02-11 | End: 2020-02-17 | Stop reason: HOSPADM

## 2020-02-11 RX ORDER — BENZTROPINE MESYLATE 1 MG/1
1 TABLET ORAL EVERY 6 HOURS PRN
Status: DISCONTINUED | OUTPATIENT
Start: 2020-02-11 | End: 2020-02-17 | Stop reason: HOSPADM

## 2020-02-11 RX ORDER — IBUPROFEN 600 MG/1
600 TABLET ORAL EVERY 8 HOURS PRN
Status: DISCONTINUED | OUTPATIENT
Start: 2020-02-11 | End: 2020-02-17 | Stop reason: HOSPADM

## 2020-02-11 RX ORDER — AMLODIPINE BESYLATE 5 MG/1
TABLET ORAL
COMMUNITY
Start: 2020-01-04 | End: 2020-02-17 | Stop reason: HOSPADM

## 2020-02-11 RX ORDER — GABAPENTIN 300 MG/1
600 CAPSULE ORAL
Status: DISCONTINUED | OUTPATIENT
Start: 2020-02-11 | End: 2020-02-17 | Stop reason: HOSPADM

## 2020-02-11 RX ORDER — LORAZEPAM 1 MG/1
2 TABLET ORAL ONCE
Status: COMPLETED | OUTPATIENT
Start: 2020-02-11 | End: 2020-02-11

## 2020-02-11 RX ORDER — FOLIC ACID 1 MG/1
1 TABLET ORAL DAILY
Status: DISCONTINUED | OUTPATIENT
Start: 2020-02-11 | End: 2020-02-17 | Stop reason: HOSPADM

## 2020-02-11 RX ORDER — HYDROCHLOROTHIAZIDE 25 MG/1
TABLET ORAL
COMMUNITY
Start: 2020-01-07 | End: 2020-02-17 | Stop reason: HOSPADM

## 2020-02-11 RX ORDER — LORAZEPAM 0.5 MG/1
0.5 TABLET ORAL EVERY 8 HOURS PRN
Status: DISCONTINUED | OUTPATIENT
Start: 2020-02-11 | End: 2020-02-12

## 2020-02-11 RX ORDER — FOLIC ACID 1 MG/1
1 TABLET ORAL DAILY
Status: DISCONTINUED | OUTPATIENT
Start: 2020-02-11 | End: 2020-02-11

## 2020-02-11 RX ORDER — ZIPRASIDONE MESYLATE 20 MG/ML
20 INJECTION, POWDER, LYOPHILIZED, FOR SOLUTION INTRAMUSCULAR EVERY 4 HOURS PRN
Status: DISCONTINUED | OUTPATIENT
Start: 2020-02-11 | End: 2020-02-17 | Stop reason: HOSPADM

## 2020-02-11 RX ORDER — HALOPERIDOL 5 MG/ML
5 INJECTION INTRAMUSCULAR EVERY 6 HOURS PRN
Status: DISCONTINUED | OUTPATIENT
Start: 2020-02-11 | End: 2020-02-17 | Stop reason: HOSPADM

## 2020-02-11 RX ORDER — LITHIUM CARBONATE 300 MG/1
300 CAPSULE ORAL 2 TIMES DAILY
Status: DISCONTINUED | OUTPATIENT
Start: 2020-02-11 | End: 2020-02-16

## 2020-02-11 RX ORDER — DOXEPIN HYDROCHLORIDE 50 MG/1
50 CAPSULE ORAL
Status: DISCONTINUED | OUTPATIENT
Start: 2020-02-11 | End: 2020-02-17 | Stop reason: HOSPADM

## 2020-02-11 RX ORDER — LITHIUM CARBONATE 300 MG
300 TABLET ORAL
COMMUNITY
End: 2020-02-17 | Stop reason: HOSPADM

## 2020-02-11 RX ORDER — DISULFIRAM 250 MG/1
250 TABLET ORAL
COMMUNITY
Start: 2019-12-24 | End: 2020-02-17 | Stop reason: HOSPADM

## 2020-02-11 RX ORDER — LEVOTHYROXINE SODIUM 0.05 MG/1
50 TABLET ORAL
Status: DISCONTINUED | OUTPATIENT
Start: 2020-02-11 | End: 2020-02-17 | Stop reason: HOSPADM

## 2020-02-11 RX ORDER — ACETAMINOPHEN 325 MG/1
650 TABLET ORAL EVERY 6 HOURS PRN
Status: DISCONTINUED | OUTPATIENT
Start: 2020-02-11 | End: 2020-02-11

## 2020-02-11 RX ORDER — ATORVASTATIN CALCIUM 40 MG/1
40 TABLET, FILM COATED ORAL DAILY
Status: DISCONTINUED | OUTPATIENT
Start: 2020-02-11 | End: 2020-02-17 | Stop reason: HOSPADM

## 2020-02-11 RX ORDER — MAGNESIUM HYDROXIDE/ALUMINUM HYDROXICE/SIMETHICONE 120; 1200; 1200 MG/30ML; MG/30ML; MG/30ML
30 SUSPENSION ORAL EVERY 4 HOURS PRN
Status: DISCONTINUED | OUTPATIENT
Start: 2020-02-11 | End: 2020-02-17 | Stop reason: HOSPADM

## 2020-02-11 RX ORDER — NICOTINE 21 MG/24HR
1 PATCH, TRANSDERMAL 24 HOURS TRANSDERMAL DAILY
Status: DISCONTINUED | OUTPATIENT
Start: 2020-02-11 | End: 2020-02-17 | Stop reason: HOSPADM

## 2020-02-11 RX ORDER — SUCRALFATE 1 G/1
1 TABLET ORAL
Status: DISCONTINUED | OUTPATIENT
Start: 2020-02-11 | End: 2020-02-17 | Stop reason: HOSPADM

## 2020-02-11 RX ORDER — LOSARTAN POTASSIUM 50 MG/1
50 TABLET ORAL DAILY
Status: DISCONTINUED | OUTPATIENT
Start: 2020-02-11 | End: 2020-02-12

## 2020-02-11 RX ADMIN — THIAMINE HCL TAB 100 MG 100 MG: 100 TAB at 08:25

## 2020-02-11 RX ADMIN — LURASIDONE HYDROCHLORIDE 80 MG: 80 TABLET, FILM COATED ORAL at 16:50

## 2020-02-11 RX ADMIN — LEVOTHYROXINE SODIUM 50 MCG: 50 TABLET ORAL at 05:52

## 2020-02-11 RX ADMIN — LITHIUM CARBONATE 300 MG: 300 CAPSULE, GELATIN COATED ORAL at 11:25

## 2020-02-11 RX ADMIN — FOLIC ACID 1 MG: 1 TABLET ORAL at 08:25

## 2020-02-11 RX ADMIN — LORAZEPAM 2 MG: 1 TABLET ORAL at 12:45

## 2020-02-11 RX ADMIN — NICOTINE 1 PATCH: 21 PATCH, EXTENDED RELEASE TRANSDERMAL at 08:25

## 2020-02-11 RX ADMIN — SUCRALFATE 1 G: 1 TABLET ORAL at 21:53

## 2020-02-11 RX ADMIN — VENLAFAXINE HYDROCHLORIDE 75 MG: 75 CAPSULE, EXTENDED RELEASE ORAL at 11:25

## 2020-02-11 RX ADMIN — PANTOPRAZOLE SODIUM 40 MG: 40 TABLET, DELAYED RELEASE ORAL at 11:25

## 2020-02-11 RX ADMIN — ATORVASTATIN CALCIUM 40 MG: 40 TABLET, FILM COATED ORAL at 11:24

## 2020-02-11 RX ADMIN — GABAPENTIN 600 MG: 300 CAPSULE ORAL at 21:53

## 2020-02-11 RX ADMIN — GABAPENTIN 400 MG: 400 CAPSULE ORAL at 16:49

## 2020-02-11 RX ADMIN — ACETAMINOPHEN 650 MG: 325 TABLET ORAL at 08:27

## 2020-02-11 RX ADMIN — GABAPENTIN 400 MG: 400 CAPSULE ORAL at 11:25

## 2020-02-11 RX ADMIN — LITHIUM CARBONATE 300 MG: 300 CAPSULE, GELATIN COATED ORAL at 17:44

## 2020-02-11 RX ADMIN — DOXEPIN HYDROCHLORIDE 50 MG: 50 CAPSULE ORAL at 21:53

## 2020-02-11 RX ADMIN — Medication 1 TABLET: at 08:25

## 2020-02-11 RX ADMIN — CLONIDINE HYDROCHLORIDE 0.1 MG: 0.1 TABLET ORAL at 17:44

## 2020-02-11 RX ADMIN — SUCRALFATE 1 G: 1 TABLET ORAL at 11:25

## 2020-02-11 RX ADMIN — SUCRALFATE 1 G: 1 TABLET ORAL at 16:51

## 2020-02-11 RX ADMIN — CLONIDINE HYDROCHLORIDE 0.1 MG: 0.1 TABLET ORAL at 11:24

## 2020-02-11 RX ADMIN — LOSARTAN POTASSIUM 50 MG: 50 TABLET, FILM COATED ORAL at 16:50

## 2020-02-11 NOTE — ED NOTES
Patient medically cleared at this time  Patient met with crisis worker and is agreeable to signing in  Patient stated that she has been struggling with her relationship with her older son  Patient stated her son was locked up for 13 years  Her son was released, coming up on 2 years and she stated she used to tell everyone they had the best relationship  Patient stated that her son says he loves her but it isn't the same and it makes her sad  Patient stated she has been sober for 3 weeks and woke up today wanting to drink because she was sad about her son  Patient stated she goes to [facility] since last discharge and takes all her medications as she is supposed to  Patient stated that she was doing okay since discharge but just woke up in a ""funk" this morning  Patient stated that she overdoses all the time and she doesn't know why she keeps doing it  Patient in need of stabilization from depression episode leading to overdose  Patient denied auditory and visual hallucinations  Patient denied homicidal ideations

## 2020-02-11 NOTE — PLAN OF CARE
Pt sleeping when worker attempted to complete biopsychosocial assessment  Pt did not want to get out of bed to complete assessment and pt's roommate was in room at the time  Worker will attempt to meet with pt again at a later time

## 2020-02-11 NOTE — ASSESSMENT & PLAN NOTE
Patient is medically cleared for inpatient psychiatric admission  IM will sign off    Please re-contact if any questions or concerns

## 2020-02-11 NOTE — CMS CERTIFICATION NOTE
Certification: Based upon physical, mental and social evaluations, I certify that inpatient psychiatric services are medically necessary for this patient for a duration of 4 midnights for the treatment of Bipolar Depression  Available alternative community resources do not meet the patient's mental health care needs  I further attest that an established written individualized plan of care has been implemented and is outlined in the patient's medical records

## 2020-02-11 NOTE — PROGRESS NOTES
Attempted to meet with patient about her elevated Audit Score of 15,  but she refused  She stated "I quit"  I only had 2 beers"  Asked her if I could meet with her tomorrow and she agreed

## 2020-02-11 NOTE — TREATMENT TEAM
02/11/20 1200   Team Meeting   Meeting Type Daily Rounds   Initial Conference Date 02/11/20   Team Members Present   Team Members Present Physician;Nurse; Other (Discipline and Name);    Physician Team Member Dr Sachin Ibarra Team Member TOMMY Gilliland   Social Work Team Member Zoe Zavaleta   Other (Discipline and Name) Pam Domingo   Patient/Family Present   Patient Present No   Patient's Family Present No   Pt assigned to Dr Yolanda Beck attending  Psych MD to consult with patient today  [facility] to perform h&P  Continues CIWA protocol

## 2020-02-11 NOTE — ED NOTES
Patient is accepted at [facility]  Patient is accepted by Dr Lupe Duncan per insight    Patient may go to the floor after report is completed  Nurse report is to be called to [phone] prior to patient transfer

## 2020-02-11 NOTE — ED NOTES
EVS (Eligibility Verification System) Automated system indicates:     PROMISe: Patient not on file  Media file has Medicare A/B card scanned in

## 2020-02-11 NOTE — TREATMENT PLAN
TREATMENT PLAN REVIEW - [address] 48 y o  1965 female MRN: [id number]    [address] [place] [address] Encounter: [id number]          Admit Date/Time:  2/10/2020  5:36 PM    Treatment Team: Attending Provider: Navya Hall MD; Patient Care Assistant: Kari Mercedes; Patient Care Assistant: Anastacia Aguilar; Registered Nurse: Simone Avilez RN; Patient Care Technician: Janie Etienne; : [employer]; Nurse Practitioner: YONG Vogt; Occupational Therapist: Rigo Rosas    Diagnosis: Principal Problem:    Bipolar I disorder, most recent episode depressed, severe without psychotic features ([facility] [address] )  Active Problems:    Alcohol use disorder, moderate, dependence ([facility] [address] )    Post-traumatic stress disorder, chronic    Generalized anxiety disorder      Patient Strengths/Assets: cooperative, patient is on a voluntary commitment    Patient Barriers/Limitations: difficulty adapting, lack of social/family support, limited support system, marital/family conflict, poor self-care, poor support system, substance abuse    Short Term Goals: decrease in depressive symptoms    Long Term Goals: improvement in depression, free of suicidal thoughts    Progress Towards Goals: starting psychiatric medications as prescribed    Recommended Treatment: medication management, patient medication education, group therapy, milieu therapy, continued Behavioral Health psychiatric evaluation/assessment process    Treatment Frequency: daily medication monitoring, group and milieu therapy daily, monitoring through interdisciplinary rounds, monitoring through weekly patient care conferences    Expected Discharge Date:   In 3 to 5 days     Discharge Plan: referrals as indicated    Treatment Plan Created/Updated By: Neymar Amaro MD

## 2020-02-11 NOTE — ED NOTES
Patient stated that she is willing to sign in as long as she get treatment at [facility]  Patient informed there is a bed identified here for her as long as she is cooperative and does not try to leave crisis will start working on admission to psych

## 2020-02-11 NOTE — PROGRESS NOTES
Patient reported "heart palpitations"  /88  [facility] paged and aware of same  ECG to be ordered  Will monitor

## 2020-02-11 NOTE — CONSULTS
Consult- Yumi Canales 1965, 48 y o  female MRN: [id number]    Unit/Bed#: [facility] 379-02 Encounter: [id number]    Primary Care Provider: YONG Aivla   Date and time admitted to hospital: 2/10/2020  5:36 PM    Inpatient consult for Medical Clearance for [facility] patient  Consult performed by: YONG Ayala  Consult ordered by: Larissa Fernandez MD        Medical clearance for psychiatric admission  Assessment & Plan  Patient is medically cleared for inpatient psychiatric admission  IM will sign off  Please re-contact if any questions or concerns    Overdose of trazodone  Assessment & Plan   Intentional overdose of trazodone  Per psychiatry further management for suicidal attempt    Generalized anxiety disorder  Assessment & Plan  Per psych    Tobacco abuse  Assessment & Plan   Encouraged tobacco cessation  Continue nicotine patch    Hypercholesteremia  Assessment & Plan   Continue atorvastatin 40 milligrams daily    Post-traumatic stress disorder, chronic  Assessment & Plan  Per psych    Alcohol use disorder, moderate, dependence (HCC)  Assessment & Plan  Counseling per psych    Acquired hypothyroidism  Assessment & Plan   Continue levothyroxine 50 micrograms daily    Essential hypertension  Assessment & Plan  Patient was previously on lisinopril for hypertension  She reports a dry persistent cough  Will change to losartan 50 milligrams daily  Additionally of note that her blood pressure has been fluctuating dramatically but she has also been experiencing some withdrawal symptoms  Continue to monitor need her withdrawal and anxiety controlled prior to further adjustment of her medications at this time    * Bipolar I disorder, most recent episode depressed, severe without psychotic features [facility])  Assessment & Plan  Per psych      Patient did report episode of palpitations this morning  She reports states she has been intermittently  There primarily associated with anxiety    She additionally had some episode chest tightness and shortness of breath she was having increasing anxiety and was medicated with Ativan  The symptoms resolved after receiving medication and patient was evaluated she denies any symptoms at this time  EKG was completed when she initially was complaining of palpitations and she was noted to be in sinus rhythm    History of Present Illness:    Pily Benoit is a 48 y o  female who is originally admitted to the psychiatry service on 2/10/2020     Patient present to emergency department for intentional overdose trazodone  She was monitored in the emergency department in case was discussed with poison control  Patient was deemed medically stable and transferred to inpatient psychiatric unit  Patient does have a past medical history hypertension, hyperlipidemia, hypothyroid, tobacco abuse, alcohol abuse  Patient did report episode of palpitations this morning  She reports states she has been intermittently  There primarily associated with anxiety  She additionally had some episode chest tightness and shortness of breath she was having increasing anxiety and was medicated with Ativan  The symptoms resolved after receiving medication and patient was evaluated she denies any symptoms at this time  EKG was completed when she initially was complaining of palpitations and she was noted to be in sinus rhythm     We are consulted for medical clearance  Review of Systems:    Review of Systems   Constitutional: Positive for fatigue (relating to medication)  Negative for activity change, appetite change, chills and fever  Respiratory: Negative for cough, chest tightness, shortness of breath and wheezing  Cardiovascular: Negative for chest pain, palpitations (hx of relating to anxiety) and leg swelling  Gastrointestinal: Negative for abdominal pain, constipation, diarrhea, nausea and vomiting     Neurological: Negative for dizziness, syncope, light-headedness and headaches  Psychiatric/Behavioral: Positive for dysphoric mood and sleep disturbance  The patient is nervous/anxious  Past Medical and Surgical History:     Past Medical History:   Diagnosis Date    Alcohol abuse     [facility] [facility]     Anxiety     Bipolar disorder (HCC)     Bowel obstruction (HCC)     Depression     Gastritis     Head injury     Heart palpitations     Hyperlipidemia     Hypertension     Hypothyroidism     PTSD (post-traumatic stress disorder)     Seizure ([facility] )     Sleep difficulties     Suicide attempt [facility])        Past Surgical History:   Procedure Laterality Date    ABDOMINAL SURGERY      APPENDECTOMY      BOWEL RESECTION      CHOLECYSTECTOMY      ESOPHAGOGASTRODUODENOSCOPY N/A 5/18/2018    Procedure: ESOPHAGOGASTRODUODENOSCOPY (EGD) with bx;  Surgeon: Kilo Ayon DO;  [facility]; Service: Gastroenterology    HYSTERECTOMY      KNEE SURGERY Bilateral        Meds/Allergies:    all medications and allergies reviewed    Allergies: Allergies   Allergen Reactions    Latex Rash       Social History:     Marital Status:     Substance Use History:   Social History     Substance and Sexual Activity   Alcohol Use Yes    Alcohol/week: 21 0 standard drinks    Types: 21 Cans of beer per week    Frequency: 2-3 times a week    Drinks per session: 3 or 4    Binge frequency: Weekly    Comment: As of 11/14, reports 7 large beers about 2-3 times a week       Social History     Tobacco Use   Smoking Status Current Every Day Smoker    Packs/day: 1 00    Years: 25 00    Pack years: 25 00    Types: Cigarettes   Smokeless Tobacco Never Used     Social History     Substance and Sexual Activity   Drug Use No       Family History:  Reviewed    Physical Exam:     Vitals:   Blood Pressure: 126/65 (02/11/20 1445)  Pulse: 96 (02/11/20 1445)  Temperature: 98 °F (36 7 °C) (02/11/20 1445)  Temp Source: Temporal (02/11/20 1445)  Respirations: 16 (02/11/20 1445)  Height: 5' 2" (157 5 cm) (02/11/20 0404)  Weight - Scale: 90 7 kg (200 lb) (02/11/20 0404)  SpO2: 94 % (02/11/20 0404)    Physical Exam   Constitutional: She is oriented to person, place, and time  She appears well-developed and well-nourished  No distress  Neck: Normal range of motion  Neck supple  Cardiovascular: Normal rate, regular rhythm and normal heart sounds  No murmur heard  No pedal edema   Pulmonary/Chest: Effort normal and breath sounds normal  No respiratory distress  She has no wheezes  Abdominal: Soft  Bowel sounds are normal  She exhibits no distension  There is no tenderness  Neurological: She is alert and oriented to person, place, and time  No focal deficits   Skin: Skin is warm and dry  Psychiatric:   Pleasant, cooperative   Nursing note and vitals reviewed  Additional Data:     Labs and imaging reviewed    EKG Reviewed     M*Modal software was used to dictate this note  It may contain errors with dictating incorrect words or incorrect spelling  Please contact the provider directly with any questions

## 2020-02-11 NOTE — NURSING NOTE
ADMISSION: Patient was admitted on voluntary 201 status from ED at around 0300  Patient is a 48year old female  Patient was cooperative with admission process but appeared to be sedated and was slow to answer questions and unable to provide much detail  Patient reported that she was overwhelmed and overdosed on a bottle of Trazadone while drinking alcohol in an attempt to end her life  Patient denied suicidal ideation on admission  Patient reported previous suicide attempts via overdose  Patient denied any history of violence, homicidal ideations, auditory or visual hallucinations  Patient reported severe depression and anxiety on admission  Patient reported history of alcohol abuse that was treated in rehab  Patient reported she was sober for a month before relapsing yesterday  Patient denied any substance abuse and her UDS was negative  Patient reported a medical history of hypertension and hypothyroidism  A seizure history is listed on patient's chart but patient denies every having a seizure  Patient has stress urinary incontinence  Patient denied any pain and did not demonstrate any overt signs of acute alcohol withdrawal on admission  Patient will be monitored on [facility] protocol for alcohol withdrawal  Patient reports having been compliant with her medications but medications need to be verified with patient's pharmacy  Patient will be referred to medical team for medical consult  Will continue to monitor patient closely on suicide precautions

## 2020-02-11 NOTE — PROGRESS NOTES
EKG normal and Kasandra Dandre aware of same  Patient med and meal compliant  Reports anxiety and depression  Denies S/HI,A/VH and pain at this time  Reports she has "palpitations are normal for me in the morning  "Denies symptoms currently   Isolates and withdrawn after breakfast  Will continue to monitor and provide therapeutic support

## 2020-02-11 NOTE — CONSULTS
INTERPROFESSIONAL (PHONE) [facility]  Tereza Burk 48 y o  female MRN: [id number]  Unit/Bed#: ED 02 Encounter: [id number]      Reason for Consult / Principal Problem: overdose  Inpatient consult to Toxicology  Consult performed by: Juan Daniel Donahue DO  Consult ordered by: Luis Antonio Pink III, DO        02/10/20      ASSESSMENT:  48year-old female with acute, intentional overdose on trazodone  1  Acute, intentional overdose on trazodone  2  Transaminitis  3  Ethanol intoxication       RECOMMENDATIONS:  Please observe patient for 6 hours from time of ingestion  Activated charcoal 50 grams may be given but is not imperative  Please see below for description of trazodone toxicity  Regarding transaminitis, history suggests an alcoholic component, but with ALT greater than AST in the setting of no gastrointestinal symptoms, I would suggest outpatient hepatitis panel and hepatic ultrasound  However, in the acute setting, please repeat a CMP 1-2 hours after recent laboratory draw to assure no dynamic elevations in AST and ALT to indicate possibility of acetaminophen toxicity  If AST and/or ALT are rising, please start 21 hour course of NAC and admit for observation  We will follow for further evaluation  If asymptomatic after six hours of observation, with normal mental status, vital signs and ambulatory ability, as well as stable transaminases, she may be considered stable for psychiatric evaluation  Additionally, please hydrate and monitor for alcohol withdrawal  Should patient develop symptoms consistent with trazodone toxicity, please admit for observation and cardiac monitoring, and reach out to  on call  Additionally, please seek assistance from medical toxicology for alcohol withdrawal and this may not necessitate medical admission         For further questions, please contact the medical  on call via [place] [employer] or throughl the [employer]  Service or Patient Access Center  Please see additional teaching note below:    Department of Medical Toxicology  [address] [place] [address]  Trazodone Toxicity Discussion    Background:  Trazodone is an atypical tetracyclic antidepressant with anxiolytic and hypnotic properties  It is primarily used to treat depression and in some cases, to aid in sleep  Pharmacology:   Mechanisms of Action:   Trazodone increases serotonin activity by three mechanisms:   1  7-lanoddrkwthvamamf-2X (5HT2A) receptor antagonism   2  Selective reuptake inhibition  3  Postsynaptic serotonin agonism via the active metabolite, m-chlorophenyl piperazine  Additionally, it is also an alpha-1 adrenergic receptor antagonist     Maximum daily dose is 400-600mg   Immediate release trazodone is absorbed rapidly with peak serum concentrations occurring in 1-2 hours after dosing  Serum concentration may not peak until at least 9 hours with extended release formulary  Toxicity:  Inadvertent ingestions are rarely associated with toxicity  Overdoses generally produce mild to moderate toxicity  Symptoms may include drowsiness, lethargy, ataxia, nausea, vomiting, myoclonus, seizures, bradycardia, hypotension, and priapism  There are also reports of QT prolongation, torsades de pointes, and serotonin syndrome  Treatment:  Treatment is supportive  Asymptomatic patients can be observed for 4-6 hours if immediate release is ingested and 12-16 hours if extended release is ingested  If no development of symptoms during that timeframe, then the patient will unlikely become ill  Symptomatic patients should be admitted  Hypotension is treated with intravenous fluids, and if necessary, vasopressors  Bradycardia associated with hypotension may be treated with atropine or vasopressors  Seizures can be treated with benzodiazepines  QT prolongation should be treated with electrolyte optimization          Resources: [place]  Last updated 9/21/18      Hx and PE limited by the dynamics of a phone consultation  I have not personally interviewed or evaluated the patient, but only advised based on the information provided to me  Primary provider is responsible for all clinical decisions  Pertinent history, physical exam and clinical findings and course discussed: Cielo Pizano is a 48y o  year old female who presents with report of overdosing on trazodone between 9pm last night and this morning  She denies coingestion and is asymptomatic at this time (7pm)  Review of systems and physical exam not performed by me  Historical Information   Past Medical History:   Diagnosis Date    Alcoholism ([facility] [address] )     Anxiety     Bipolar disorder (HCC)     Bowel obstruction (HCC)     Gastritis     Head injury     Heart palpitations     Hyperlipidemia     Hypertension     Hypothyroidism     PTSD (post-traumatic stress disorder)     [facility]     Suicide attempt [facility])      Past Surgical History:   Procedure Laterality Date    ABDOMINAL SURGERY      APPENDECTOMY      BOWEL RESECTION      CHOLECYSTECTOMY      ESOPHAGOGASTRODUODENOSCOPY N/A 5/18/2018    Procedure: ESOPHAGOGASTRODUODENOSCOPY (EGD) with bx;  Surgeon: Anita Gomez DO;  [facility]: [facility]; Service: Gastroenterology    HYSTERECTOMY      KNEE SURGERY Bilateral      Social History   Social History     Substance and Sexual Activity   Alcohol Use Yes    Alcohol/week: 21 0 standard drinks    Types: 21 Cans of beer per week    Frequency: 2-3 times a week    Drinks per session: 3 or 4    Binge frequency: Weekly    Comment: As of 11/14, reports 7 large beers about 2-3 times a week       Social History     Substance and Sexual Activity   Drug Use No     Social History     Tobacco Use   Smoking Status Current Every Day Smoker    Packs/day: 1 00    Years: 25 00    Pack years: 25 00    Types: Cigarettes   Smokeless Tobacco Never Used     Family History   Problem Relation Age of Onset    Diabetes Father         Prior to Admission medications    Medication Sig Start Date End Date Taking?  Authorizing Provider   atorvastatin (LIPITOR) 40 mg tablet Take 40 mg by mouth daily    Historical Provider, MD   cloNIDine (CATAPRES) 0 1 mg tablet Take 1 tablet (0 1 mg total) by mouth 2 (two) times a day 11/19/19   YONG Davis   doxepin (SINEquan) 50 mg capsule Take 1 capsule (50 mg total) by mouth daily at bedtime 12/18/19   Konstantin Hardy MD   folic acid (FOLVITE) 1 mg tablet Take 1 tablet (1 mg total) by mouth daily 11/19/19   YONG Davis   gabapentin (NEURONTIN) 300 mg capsule Take 2 capsules (600 mg total) by mouth daily at bedtime 12/18/19   Konstantin Hardy MD   gabapentin (NEURONTIN) 400 mg capsule Take 400 mg by mouth 3 (three) times a day    Historical Provider, MD   lamoTRIgine (LaMICtal) 25 mg tablet Take 25 mg by mouth daily at bedtime    Historical Provider, MD   levothyroxine 50 mcg tablet Take 1 tablet (50 mcg total) by mouth daily 11/19/19   YONG Davis   lisinopril-hydrochlorothiazide (PRINZIDE,ZESTORETIC) 20-25 MG per tablet Take 1 tablet by mouth daily 10/30/19   Historical Provider, MD   lurasidone (LATUDA) 80 mg tablet Take 1 tablet (80 mg total) by mouth daily with dinner 12/18/19   Konstantin Hardy MD   metoprolol tartrate (LOPRESSOR) 50 mg tablet Take 1 tablet (50 mg total) by mouth every 12 (twelve) hours 11/19/19   YONG Davis   pantoprazole (PROTONIX) 40 mg tablet Take 1 tablet (40 mg total) by mouth daily in the early morning 11/19/19   YONG Davis   sucralfate (CARAFATE) 1 g tablet Take 1 tablet (1 g total) by mouth 4 (four) times a day (before meals and at bedtime) 11/19/19   YONG aDvis   traZODone (DESYREL) 100 mg tablet Take 2 tablets (200 mg total) by mouth daily at bedtime 12/18/19   Konstantin Hardy MD   venlafaxine [facility] ) 75 mg 24 hr capsule Take 3 capsules (225 mg total) by mouth daily 12/19/19   Garry Morales MD       Current Facility-Administered Medications   Medication Dose Route Frequency    sodium chloride 0 9 % bolus 1,000 mL  1,000 mL Intravenous Once       Allergies   Allergen Reactions    Latex Rash       Objective     No intake or output data in the 24 hours ending 02/10/20 1901    Invasive Devices:   Peripheral IV 02/10/20 Right Antecubital (Active)   Site Assessment Clean;Dry; Intact 2/10/2020  6:17 PM   Dressing Type Securing device;Transparent 2/10/2020  6:17 PM   Line Status Blood return noted; Flushed;Saline locked 2/10/2020  6:17 PM   Dressing Status Clean;Dry; Intact 2/10/2020  6:17 PM       Vitals   Vitals:    02/10/20 1736   BP: 143/97   Pulse: (!) 110   Resp: 16   Patient Position - Orthostatic VS: Sitting         EKG, Pathology, and/or Other Studies: discussed ECG with ED physician: normal rate, QRS and QTc       Lab Results: I have personally reviewed pertinent reports        Labs:    Results from last 7 days   Lab Units 02/10/20  1802   WBC Thousand/uL 11 10*   HEMOGLOBIN g/dL 13 9   HEMATOCRIT % 41 3   PLATELETS Thousands/uL 290   NEUTROS PCT % 67*   LYMPHS PCT % 24*   MONOS PCT % 8      Results from last 7 days   Lab Units 02/10/20  1802   SODIUM mmol/L 137   POTASSIUM mmol/L 3 3*   CHLORIDE mmol/L 98   CO2 mmol/L 26   BUN mg/dL 11   CREATININE mg/dL 0 84   CALCIUM mg/dL 9 6   ALK PHOS U/L 116   ALT U/L 93*   AST U/L 77*      Results from last 7 days   Lab Units 02/10/20  1802   INR  0 86*   PTT seconds 25         0   Lab Value Date/Time    TROPONINI 0 02 12/22/2019 1812    TROPONINI 0 02 05/07/2019 1803    TROPONINI 0 02 05/07/2019 1447    TROPONINI 0 02 05/07/2019 1205    TROPONINI 0 02 05/07/2019 0901    TROPONINI <0 02 05/07/2019 0540    TROPONINI <0 01 08/09/2018 1825    TROPONINI <0 02 05/16/2018 1258         Results from last 7 days   Lab Units 02/10/20  1802   ACETAMINOPHEN LVL ug/mL <10*   ETHANOL LVL mg/dL 486*   SALICYLATE LVL mg/dL <4 9*     Counseling / Coordination of Care  Total time spent today 38 minutes  This was a phone consultation

## 2020-02-12 LAB
RPR SER QL: NORMAL
TSH SERPL DL<=0.05 MIU/L-ACNC: 2.54 UIU/ML (ref 0.47–4.68)

## 2020-02-12 PROCEDURE — 84443 ASSAY THYROID STIM HORMONE: CPT | Performed by: PSYCHIATRY & NEUROLOGY

## 2020-02-12 PROCEDURE — 99232 SBSQ HOSP IP/OBS MODERATE 35: CPT | Performed by: NURSE PRACTITIONER

## 2020-02-12 RX ORDER — VENLAFAXINE HYDROCHLORIDE 37.5 MG/1
37.5 CAPSULE, EXTENDED RELEASE ORAL DAILY
Status: DISCONTINUED | OUTPATIENT
Start: 2020-02-13 | End: 2020-02-14

## 2020-02-12 RX ORDER — LORAZEPAM 1 MG/1
1 TABLET ORAL ONCE
Status: COMPLETED | OUTPATIENT
Start: 2020-02-12 | End: 2020-02-12

## 2020-02-12 RX ORDER — LOSARTAN POTASSIUM 50 MG/1
100 TABLET ORAL DAILY
Status: DISCONTINUED | OUTPATIENT
Start: 2020-02-12 | End: 2020-02-17 | Stop reason: HOSPADM

## 2020-02-12 RX ORDER — HYDRALAZINE HYDROCHLORIDE 25 MG/1
25 TABLET, FILM COATED ORAL EVERY 6 HOURS PRN
Status: DISCONTINUED | OUTPATIENT
Start: 2020-02-12 | End: 2020-02-12

## 2020-02-12 RX ORDER — LORAZEPAM 1 MG/1
2 TABLET ORAL EVERY 8 HOURS PRN
Status: DISCONTINUED | OUTPATIENT
Start: 2020-02-12 | End: 2020-02-17 | Stop reason: HOSPADM

## 2020-02-12 RX ORDER — LOSARTAN POTASSIUM 50 MG/1
100 TABLET ORAL DAILY
Status: DISCONTINUED | OUTPATIENT
Start: 2020-02-13 | End: 2020-02-12

## 2020-02-12 RX ORDER — LORAZEPAM 1 MG/1
1 TABLET ORAL EVERY 6 HOURS PRN
Status: DISCONTINUED | OUTPATIENT
Start: 2020-02-12 | End: 2020-02-17 | Stop reason: HOSPADM

## 2020-02-12 RX ORDER — GABAPENTIN 400 MG/1
400 CAPSULE ORAL 3 TIMES DAILY
Status: DISCONTINUED | OUTPATIENT
Start: 2020-02-12 | End: 2020-02-17 | Stop reason: HOSPADM

## 2020-02-12 RX ORDER — AMLODIPINE BESYLATE 5 MG/1
5 TABLET ORAL 2 TIMES DAILY
Status: DISCONTINUED | OUTPATIENT
Start: 2020-02-12 | End: 2020-02-17 | Stop reason: HOSPADM

## 2020-02-12 RX ORDER — METOPROLOL TARTRATE 50 MG/1
50 TABLET, FILM COATED ORAL EVERY 12 HOURS SCHEDULED
Status: DISCONTINUED | OUTPATIENT
Start: 2020-02-12 | End: 2020-02-17 | Stop reason: HOSPADM

## 2020-02-12 RX ADMIN — AMLODIPINE BESYLATE 5 MG: 5 TABLET ORAL at 18:54

## 2020-02-12 RX ADMIN — SUCRALFATE 1 G: 1 TABLET ORAL at 17:21

## 2020-02-12 RX ADMIN — LORAZEPAM 1 MG: 1 TABLET ORAL at 10:58

## 2020-02-12 RX ADMIN — SUCRALFATE 1 G: 1 TABLET ORAL at 12:33

## 2020-02-12 RX ADMIN — SUCRALFATE 1 G: 1 TABLET ORAL at 06:04

## 2020-02-12 RX ADMIN — LURASIDONE HYDROCHLORIDE 80 MG: 80 TABLET, FILM COATED ORAL at 17:21

## 2020-02-12 RX ADMIN — LOSARTAN POTASSIUM 50 MG: 50 TABLET, FILM COATED ORAL at 08:16

## 2020-02-12 RX ADMIN — HYDRALAZINE HYDROCHLORIDE 25 MG: 25 TABLET ORAL at 15:41

## 2020-02-12 RX ADMIN — PANTOPRAZOLE SODIUM 40 MG: 40 TABLET, DELAYED RELEASE ORAL at 06:04

## 2020-02-12 RX ADMIN — LEVOTHYROXINE SODIUM 50 MCG: 50 TABLET ORAL at 06:05

## 2020-02-12 RX ADMIN — THIAMINE HCL TAB 100 MG 100 MG: 100 TAB at 08:16

## 2020-02-12 RX ADMIN — FOLIC ACID 1 MG: 1 TABLET ORAL at 08:16

## 2020-02-12 RX ADMIN — SUCRALFATE 1 G: 1 TABLET ORAL at 21:24

## 2020-02-12 RX ADMIN — CLONIDINE HYDROCHLORIDE 0.1 MG: 0.1 TABLET ORAL at 08:15

## 2020-02-12 RX ADMIN — GABAPENTIN 400 MG: 400 CAPSULE ORAL at 08:15

## 2020-02-12 RX ADMIN — LOSARTAN POTASSIUM 100 MG: 50 TABLET, FILM COATED ORAL at 18:55

## 2020-02-12 RX ADMIN — GABAPENTIN 600 MG: 300 CAPSULE ORAL at 21:24

## 2020-02-12 RX ADMIN — ATORVASTATIN CALCIUM 40 MG: 40 TABLET, FILM COATED ORAL at 08:15

## 2020-02-12 RX ADMIN — CLONIDINE HYDROCHLORIDE 0.1 MG: 0.1 TABLET ORAL at 17:21

## 2020-02-12 RX ADMIN — LITHIUM CARBONATE 300 MG: 300 CAPSULE, GELATIN COATED ORAL at 08:15

## 2020-02-12 RX ADMIN — LORAZEPAM 1 MG: 1 TABLET ORAL at 17:24

## 2020-02-12 RX ADMIN — GABAPENTIN 400 MG: 400 CAPSULE ORAL at 17:21

## 2020-02-12 RX ADMIN — IBUPROFEN 600 MG: 600 TABLET ORAL at 17:25

## 2020-02-12 RX ADMIN — METOPROLOL TARTRATE 50 MG: 50 TABLET, FILM COATED ORAL at 21:24

## 2020-02-12 RX ADMIN — Medication 1 TABLET: at 08:15

## 2020-02-12 RX ADMIN — DOXEPIN HYDROCHLORIDE 50 MG: 50 CAPSULE ORAL at 21:24

## 2020-02-12 RX ADMIN — LITHIUM CARBONATE 300 MG: 300 CAPSULE, GELATIN COATED ORAL at 17:22

## 2020-02-12 RX ADMIN — VENLAFAXINE HYDROCHLORIDE 75 MG: 75 CAPSULE, EXTENDED RELEASE ORAL at 08:15

## 2020-02-12 NOTE — PROGRESS NOTES
Pt laying in bed for most of morning and refused breakfast   Pt appears lethargic  Pt denies Si's, HI's, AH's, VH's  Pt with blood pressure of 157/109 hr 92 at 07:00  Pt with no further CIWA symptoms  Pt given morning medication including antihypertensives  Vitals re-taken at 10:09 bp 154/101, hr 93, with CIWA score of 0  CRNP ordered one time dose Ativan 1mg at 10:58  Pt placed on list for [facility] to review blood pressures/heart rate

## 2020-02-12 NOTE — PROGRESS NOTES
Patient known to me with previous admission - patient  Patient just has headache- patient bp should be better control with appropriate medications  Will discontinue prn hydralazine which is short acting and causes rebound hypertension when is out of system , would not be my choice at this point  Patient previously was on metoprolol 50 mg po q12hrs, clonidine 0 1 mg po bid, norvasc and lisinopril with hctz- her hctz secondary to diuresis has been discontinued, and lisinopril has been changed to coozar to start neela ? ? -> will give coozar 100 mg po starting now, start norvasc 5mg po bid , metoprolol tartrate 50 mg po bid- GIVE MEDICATIONS 5 HALF LIFE TO AT LEAST KICK IN   No need for medical transfer will keep on psych with appropriate po medication adjustment  Did discuss with nursing CIWA barely 8- so ciwa will continued to be monitored through the night   I did discuss with with Jazmin Like Dr Ren Delarosa and updated

## 2020-02-12 NOTE — PROGRESS NOTES
[facility] notified of upward trend of blood pressures  Dr Rose Plan recommended transfer to medical unit  This writer spoke to Dr Chel Wiley from [place] to discuss discharge to medical unit  Dr Chel Wiley will address blood pressures with PO medications and patient will not require discharge to medical   Will continue to monitor

## 2020-02-12 NOTE — PROGRESS NOTES
Patient isolative and withdrawn throughout the shift  Patient reports feeling denies  Patient denied all psych symptoms  Patient with CIWA score of 0 at [address]  Held 400 mg of Neurontin and patient given the 600 until order can be verified  Will continue to monitor

## 2020-02-12 NOTE — QUICK NOTE
Pt with elevation in BP  ?? Accuracy of prior BP readings that were normal   She was transitioned on losartan 50mg yesterday  Will increase to 100mg daily and add on hydralazine prn    D/w nursing and Dr Selene Hampton

## 2020-02-12 NOTE — PROGRESS NOTES
Pt given motrin 600mf po prn for 6/10 headache  Pt given Ativan 1mg po prn for 8 on CIWA scale  Pt with bp 117/113 hr 100  Pt denies anxiety, a&o x 4, pt appears relaxed  Laying in bed  Pt getting up for dinner  Pt reports that often her blood pressure gets "really high"  Will recheck in 30 mins

## 2020-02-12 NOTE — PROGRESS NOTES
Attempted to meet with patient regarding the elevated score on the Audit of 15  She refused the second time to meet with this writer  It was noted in her chart that the [facility] believes she is going through withdrawal and is minimizing her alcohol use  She, AXEL, offered her education

## 2020-02-12 NOTE — PROGRESS NOTES
Pt given midodrine 25mg po prn at 15:41 for bp 166/104  Pt denies anxiety  Mild headache pain  Denies anxiety, pt laying in bed

## 2020-02-12 NOTE — PROGRESS NOTES
Progress Note - Behavioral Health   Derick Hein 48 y o  female MRN: [id number]  Unit/Bed#: U 379-02 Encounter: [id number]    Assessment/Plan   Principal Problem:    Bipolar I disorder, most recent episode depressed, severe without psychotic features ([facility] [address] )  Active Problems:    Essential hypertension    Acquired hypothyroidism    Alcohol use disorder, moderate, dependence (HCC)    Post-traumatic stress disorder, chronic    Hypercholesteremia    Tobacco abuse    Generalized anxiety disorder    Overdose of trazodone    Medical clearance for psychiatric admission      Subjective:Patient was seen today for continuation of care, records reviewed and  patient was discussed with the morning case review team  Kristin Nolen was seen in her room in bed resting and appears to be going through alcohol withdrawal symptoms  She was minimizing the amount of alcohol she drank prior to admission; she reports to this writer that she only drank "2 beers" the day of her admission and states that her current symptoms stems from her psychiatrist adjusting her medications and not given her all of her usual medications  Kristin Nolen blood pressure and heart rate elevated, medical team made aware of the same and p r n  Ativan given  Kristin Nolen denies endorsing any suicidal or homicidal ideation  Does not seem to be experiencing any manic or psychotic symptoms during our encounter  She remains medication compliance and denies any side effects from medications  CIWA protocol continues  Continue titrating Venlafaxine and monitor symptoms/behavior  Lithium level on 2/17      Vitals:  Vitals:    02/12/20 1009   BP: (!) 154/101   Pulse: (!) 113   Resp:    Temp:    SpO2:        Laboratory results:    I have personally reviewed all pertinent laboratory/tests results    Most Recent Labs:   Lab Results   Component Value Date    WBC 11 10 (H) 02/10/2020    RBC 4 41 02/10/2020    HGB 13 9 02/10/2020    HCT 41 3 02/10/2020     02/10/2020 RDW 14 7 02/10/2020    NEUTROABS 7 50 02/10/2020    SODIUM 140 02/10/2020    K 4 0 02/10/2020     02/10/2020    CO2 25 02/10/2020    BUN 11 02/10/2020    CREATININE 0 57 (L) 02/10/2020    GLUC 132 (H) 02/10/2020    GLUF 119 (H) 12/18/2019    CALCIUM 8 5 02/10/2020    AST 55 (H) 02/10/2020    ALT 75 (H) 02/10/2020    ALKPHOS 87 02/10/2020    TP 6 8 02/10/2020    ALB 3 8 02/10/2020    TBILI 0 30 02/10/2020    CHOLESTEROL 168 02/11/2020    HDL 58 02/11/2020    TRIG 168 (H) 02/11/2020    LDLCALC 76 02/11/2020    NONHDLC 110 02/11/2020    AMMONIA <9 (L) 02/10/2020    [id number] 2 540 02/12/2020    FREET4 0 87 05/26/2019    PREGSERUM Negative 11/15/2019    RPR Non-Reactive 02/11/2020    HGBA1C 5 4 11/15/2019     11/15/2019       Psychiatric Review of Systems:    Behavior over the last 24 hours:  unchanged  Sleep: normal  Appetite: normal  Medication side effects: No  ROS: elevated HR & BP, denies any shortness of breath or chest pain and all other systems are negative      Mental Status Evaluation:    Appearance:  casually dressed, marginal hygiene   Behavior:  pleasant, cooperative, calm   Speech:  normal rate and volume   Mood:  labile   Affect:  tearful, flat   Thought Process:  coherent, goal directed   Associations: intact associations   Thought Content:  no overt delusions   Perceptual Disturbances: no auditory hallucinations, no visual hallucinations, denies when asked   Risk Potential: Suicidal ideation - None at present  Homicidal ideation - None  Potential for aggression - No   Sensorium:  oriented to person, place, time/date and situation   Memory:  recent and remote memory grossly intact   Consciousness:  alert and awake   Attention: attention span and concentration are age appropriate   Insight:  fair   Judgment: poor   Gait/Station: normal gait/station, normal balance   Motor Activity: no abnormal movements       Progress Toward Goals:     No significant improvement    Assessment/Plan Principal Problem:    Bipolar I disorder, most recent episode depressed, severe without psychotic features ([facility] [address] )  Active Problems:    Essential hypertension    Acquired hypothyroidism    Alcohol use disorder, moderate, dependence ([facility])    Post-traumatic stress disorder, chronic    Hypercholesteremia    Tobacco abuse    Generalized anxiety disorder    Overdose of trazodone    Medical clearance for psychiatric admission      Recommended Treatment: Treatment plan and medication changes discussed and per the attending physician the plan is: 1  Continue with group therapy, milieu therapy and occupational therapy  2  Behavioral Health checks every 7 minutes  3  Continue with current medication regimen  4  Will review labs in the a m   5  Disposition Planning:    Behavioral Health Medications: all current active meds have been reviewed and continue current psychiatric medications        Current Facility-Administered Medications:  aluminum-magnesium hydroxide-simethicone 30 mL Oral Q4H PRN Sofie Kelly MD   atorvastatin 40 mg Oral Daily Aretta Landau, MD   benztropine 1 mg Intramuscular Q6H PRN Sofie Kelly MD   benztropine 1 mg Oral Q6H PRN Aaron Street MD   cloNIDine 0 1 mg Oral BID Aretta Landau, MD   doxepin 50 mg Oral HS Aretta Landau, MD   folic acid 1 mg Oral Daily Vignesh Street MD   gabapentin 400 mg Oral TID Aretta Landau, MD   gabapentin 600 mg Oral HS Aretta Landau, MD   haloperidol 5 mg Oral Q8H PRN Vignesh Street MD   haloperidol lactate 5 mg Intramuscular Q6H PRN Aaron Street MD   ibuprofen 400 mg Oral Q8H PRN Aretta Landau, MD   ibuprofen 600 mg Oral Q8H PRN Aretta Landau, MD   ibuprofen 800 mg Oral Q8H PRN Aretta Landau, MD   levothyroxine 50 mcg Oral Early Morning YONG Saravia   lithium carbonate 300 mg Oral BID Aretta Landau, MD   LORazepam 1 mg Intramuscular Q6H PRN Sofie Kelly MD LORazepam 1 mg Oral Q6H PRN Severa Meline, CRNP   LORazepam 2 mg Oral Q8H PRN Severa Meline, CRNP   losartan 50 mg Oral Daily YONG Diop   lurasidone 80 mg Oral Daily With Maritza Kohler MD   magnesium hydroxide 30 mL Oral Daily PRN Philippe Mon MD   multivitamin-minerals 1 tablet Oral Daily Philippe Mon MD   nicotine 1 patch Transdermal Daily Philippe Mon MD   nicotine polacrilex 2 mg Oral Q2H PRN Tia Watters MD   pantoprazole 40 mg Oral Early Morning Tia Watters MD   risperiDONE 1 mg Oral Q3H PRN Tia Watters MD   sucralfate 1 g Oral 4x Daily (AC & HS) Tia Watters MD   thiamine 100 mg Oral Daily Philippe Mon MD   venlafaxine 75 mg Oral Daily Tia Watters MD   ziprasidone 20 mg Oral Q4H PRN Tia Watters MD   ziprasidone 20 mg Intramuscular Q4H PRN Tia Watters MD   zolpidem 5 mg Oral HS PRN Philippe Mon MD       Risks / Benefits of Treatment:    Risks, benefits, and possible side effects of medications explained to patient and patient verbalizes understanding and agreement for treatment  Counseling / Coordination of Care:     Patient's progress reviewed with nursing staff  Medications, treatment progress and treatment plan reviewed with patient  Supportive counseling provided to the patient            Severa Meline, CRNP

## 2020-02-12 NOTE — TREATMENT TEAM
02/12/20 1300   Team Meeting   Meeting Type Daily Rounds   Initial Conference Date 02/12/20   Team Members Present   Team Members Present Physician;Nurse; Other (Discipline and Name);    Physician Team Member Moisés Sneed Team Member TOMMY Mason 8   Social Work Team Member Frederick Chaney   Other (Discipline and Name) Marvel Estrada   Patient/Family Present   Patient Present No   Patient's Family Present [facility] to consult for persistant high blood pressures/heart rate  CRNP to assess make med changes and assess

## 2020-02-12 NOTE — PROGRESS NOTES
Progress Note - Behavioral Health   Lucie Woodwardr 48 y o  female MRN: [id number]  Unit/Bed#: [facility] 379-02 Encounter: [id number]    Assessment/Plan   Principal Problem:    Bipolar I disorder, most recent episode depressed, severe without psychotic features ([facility] [address] )  Active Problems:    Essential hypertension    Acquired hypothyroidism    Alcohol use disorder, moderate, dependence (HCC)    Post-traumatic stress disorder, chronic    Hypercholesteremia    Tobacco abuse    Generalized anxiety disorder    Overdose of trazodone    Medical clearance for psychiatric admission      Subjective:Patient was seen today for continuation of care, records reviewed and  patient was discussed with the morning case review team  Nivia Ridley was calm and pleasant on approach, she continues to deny withdrawal symptoms, elevated blood pressure remains and was addressed by medical   Nivia Ridley reported sleeping well, appetite is improving, withdrawn to self and not attending groups  Nivia Ridley reports feeling better, denies anxiety, denies depression, denies suicidal or homicidal ideation, denies paranoia/delusions  She reports being ready for discharge and signed 72 hours notice to withdraw from treatment  This writer encouraged Nivia Ridley to stay and continue treatment over the weekend to monitor symptoms- especially since new blood pressure medications were recently started  Nivia Ridley agreed to rescind the 72 hours notice with tentative discharge on Monday 2/17  Nivia Ridley denies endorsing any suicidal or homicidal ideation  Does not seem to be experiencing any manic or psychotic symptoms during our encounter  She remains medication compliance and denies any side effects from medications  Will continue on current medication regimen  Vitals:  Vitals:    02/12/20 1709   BP: (!) 177/113   Pulse:    Resp:    Temp:    SpO2:        Laboratory results:    I have personally reviewed all pertinent laboratory/tests results    Most Recent Labs:   Lab Results   Component Value Date    WBC 11 10 (H) 02/10/2020    RBC 4 41 02/10/2020    HGB 13 9 02/10/2020    HCT 41 3 02/10/2020     02/10/2020    RDW 14 7 02/10/2020    NEUTROABS 7 50 02/10/2020    SODIUM 140 02/10/2020    K 4 0 02/10/2020     02/10/2020    CO2 25 02/10/2020    BUN 11 02/10/2020    CREATININE 0 57 (L) 02/10/2020    GLUC 132 (H) 02/10/2020    GLUF 119 (H) 12/18/2019    CALCIUM 8 5 02/10/2020    AST 55 (H) 02/10/2020    ALT 75 (H) 02/10/2020    ALKPHOS 87 02/10/2020    TP 6 8 02/10/2020    ALB 3 8 02/10/2020    TBILI 0 30 02/10/2020    CHOLESTEROL 168 02/11/2020    HDL 58 02/11/2020    TRIG 168 (H) 02/11/2020    LDLCALC 76 02/11/2020    NONHDLC 110 02/11/2020    AMMONIA <9 (L) 02/10/2020    [id number] 2 540 02/12/2020    FREET4 0 87 05/26/2019    PREGSERUM Negative 11/15/2019    RPR Non-Reactive 02/11/2020    HGBA1C 5 4 11/15/2019     11/15/2019       Psychiatric Review of Systems:    Behavior over the last 24 hours:  Slowly progressing  Sleep: normal  Appetite: normal  Medication side effects: No  ROS: no complaints, denies any shortness of breath or chest pain and all other systems are negative      Mental Status Evaluation:    Appearance:  casually dressed, adequate grooming   Behavior:  pleasant, cooperative, calm   Speech:  normal rate and volume   Mood:  depressed, anxious   Affect:  flat   Thought Process:  coherent, goal directed   Associations: intact associations   Thought Content:  no overt delusions   Perceptual Disturbances: no auditory hallucinations, no visual hallucinations   Risk Potential: Suicidal ideation - None  Homicidal ideation - None  Potential for aggression - No   Sensorium:  oriented to person, place and time/date   Memory:  recent and remote memory grossly intact   Consciousness:  alert and awake   Attention: attention span and concentration are age appropriate   Insight:  fair   Judgment: poor   Gait/Station: normal gait/station, normal balance   Motor Activity: no abnormal movements       Progress Toward Goals:     Slowly progressing    Assessment/Plan   Principal Problem:    Bipolar I disorder, most recent episode depressed, severe without psychotic features (HCC)  Active Problems:    Essential hypertension    Acquired hypothyroidism    Alcohol use disorder, moderate, dependence (HCC)    Post-traumatic stress disorder, chronic    Hypercholesteremia    Tobacco abuse    Generalized anxiety disorder    Overdose of trazodone    Medical clearance for psychiatric admission      Recommended Treatment: Treatment plan and medication changes discussed and per the attending physician the plan is: 1  Continue with group therapy, milieu therapy and occupational therapy  2  Behavioral Health checks every 7 minutes  3  Continue with current medication regimen  4  Will review labs in the a    5  Disposition Planning: Tentative discharge on Monday, 2/7    Behavioral Health Medications: all current active meds have been reviewed and continue current psychiatric medications        Current Facility-Administered Medications:  aluminum-magnesium hydroxide-simethicone 30 mL Oral Q4H PRN Lola Woods MD   atorvastatin 40 mg Oral Daily Lorelei Chavez MD   benztropine 1 mg Intramuscular Q6H PRN Lola Woods MD   benztropine 1 mg Oral Q6H PRN Johnathan Street MD   cloNIDine 0 1 mg Oral BID Lorelei Chavez MD   doxepin 50 mg Oral HS Lorelei Chavez MD   folic acid 1 mg Oral Daily Lola Woods MD   gabapentin 400 mg Oral TID FloYONG Guy   gabapentin 600 mg Oral HS Lorelei Chavez MD   haloperidol 5 mg Oral Q8H PRN Johnathan Street MD   haloperidol lactate 5 mg Intramuscular Q6H PRN Lola Woods MD   hydrALAZINE 25 mg Oral Q6H PRN YONG Diop   ibuprofen 400 mg Oral Q8H PRN Lorelei Chavez MD   ibuprofen 600 mg Oral Q8H PRN Lorelei Chavez MD   ibuprofen 800 mg Oral Q8H PRN Herminia Ez Mtz MD   levothyroxine 50 mcg Oral Early Morning YONG Campos   lithium carbonate 300 mg Oral BID Josey Mckenzie MD   LORazepam 1 mg Intramuscular Q6H PRN Kimberly Triana MD   LORazepam 1 mg Oral Q6H PRN YONG Murphy   LORazepam 2 mg Oral Q8H PRN YONG Murphy   [START ON 2/13/2020] losartan 100 mg Oral Daily Kasandra GARY YONG Amos   lurasidone 80 mg Oral Daily With Aurea Arreola MD   magnesium hydroxide 30 mL Oral Daily PRN Kimberly Triana MD   multivitamin-minerals 1 tablet Oral Daily Kimberly Triana MD   nicotine 1 patch Transdermal Daily Kimberly Triana MD   nicotine polacrilex 2 mg Oral Q2H PRN Josey Mckenzie MD   pantoprazole 40 mg Oral Early Morning Josey Mckenzie MD   risperiDONE 1 mg Oral Q3H PRN Josey cMkenzie MD   sucralfate 1 g Oral 4x Daily (AC & HS) Josey Mckenzie MD   thiamine 100 mg Oral Daily Kimberly Triana MD   [START ON 2/13/2020] venlafaxine 37 5 mg Oral Daily YONG Murphy   ziprasidone 20 mg Oral Q4H PRN Josey Mckenzie MD   ziprasidone 20 mg Intramuscular Q4H PRN Josey Mckenzie MD   zolpidem 5 mg Oral HS PRN Kimberly Triana MD       Risks / Benefits of Treatment:     Risks, benefits, and possible side effects of medications explained to patient and patient verbalizes understanding and agreement for treatment  Counseling / Coordination of Care:     Patient's progress reviewed with nursing staff  Medications, treatment progress and treatment plan reviewed with patient  Supportive counseling provided to the patient            YONG Murphy

## 2020-02-13 PROCEDURE — 99232 SBSQ HOSP IP/OBS MODERATE 35: CPT | Performed by: NURSE PRACTITIONER

## 2020-02-13 RX ADMIN — GABAPENTIN 600 MG: 300 CAPSULE ORAL at 21:04

## 2020-02-13 RX ADMIN — GABAPENTIN 400 MG: 400 CAPSULE ORAL at 08:11

## 2020-02-13 RX ADMIN — SUCRALFATE 1 G: 1 TABLET ORAL at 06:01

## 2020-02-13 RX ADMIN — LITHIUM CARBONATE 300 MG: 300 CAPSULE, GELATIN COATED ORAL at 17:00

## 2020-02-13 RX ADMIN — DOXEPIN HYDROCHLORIDE 50 MG: 50 CAPSULE ORAL at 21:04

## 2020-02-13 RX ADMIN — SUCRALFATE 1 G: 1 TABLET ORAL at 12:51

## 2020-02-13 RX ADMIN — SUCRALFATE 1 G: 1 TABLET ORAL at 21:04

## 2020-02-13 RX ADMIN — GABAPENTIN 400 MG: 400 CAPSULE ORAL at 16:59

## 2020-02-13 RX ADMIN — VENLAFAXINE HYDROCHLORIDE 37.5 MG: 37.5 CAPSULE, EXTENDED RELEASE ORAL at 08:11

## 2020-02-13 RX ADMIN — LURASIDONE HYDROCHLORIDE 80 MG: 80 TABLET, FILM COATED ORAL at 16:59

## 2020-02-13 RX ADMIN — FOLIC ACID 1 MG: 1 TABLET ORAL at 08:11

## 2020-02-13 RX ADMIN — Medication 1 TABLET: at 08:10

## 2020-02-13 RX ADMIN — AMLODIPINE BESYLATE 5 MG: 5 TABLET ORAL at 17:00

## 2020-02-13 RX ADMIN — ATORVASTATIN CALCIUM 40 MG: 40 TABLET, FILM COATED ORAL at 08:11

## 2020-02-13 RX ADMIN — LORAZEPAM 1 MG: 1 TABLET ORAL at 15:07

## 2020-02-13 RX ADMIN — LITHIUM CARBONATE 300 MG: 300 CAPSULE, GELATIN COATED ORAL at 08:11

## 2020-02-13 RX ADMIN — GABAPENTIN 400 MG: 400 CAPSULE ORAL at 12:52

## 2020-02-13 RX ADMIN — CLONIDINE HYDROCHLORIDE 0.1 MG: 0.1 TABLET ORAL at 17:00

## 2020-02-13 RX ADMIN — LEVOTHYROXINE SODIUM 50 MCG: 50 TABLET ORAL at 06:01

## 2020-02-13 RX ADMIN — SUCRALFATE 1 G: 1 TABLET ORAL at 16:58

## 2020-02-13 RX ADMIN — METOPROLOL TARTRATE 50 MG: 50 TABLET, FILM COATED ORAL at 08:11

## 2020-02-13 RX ADMIN — AMLODIPINE BESYLATE 5 MG: 5 TABLET ORAL at 08:11

## 2020-02-13 RX ADMIN — THIAMINE HCL TAB 100 MG 100 MG: 100 TAB at 08:11

## 2020-02-13 RX ADMIN — IBUPROFEN 600 MG: 600 TABLET ORAL at 14:03

## 2020-02-13 RX ADMIN — CLONIDINE HYDROCHLORIDE 0.1 MG: 0.1 TABLET ORAL at 08:11

## 2020-02-13 RX ADMIN — LOSARTAN POTASSIUM 100 MG: 50 TABLET, FILM COATED ORAL at 08:33

## 2020-02-13 RX ADMIN — METOPROLOL TARTRATE 50 MG: 50 TABLET, FILM COATED ORAL at 21:04

## 2020-02-13 RX ADMIN — PANTOPRAZOLE SODIUM 40 MG: 40 TABLET, DELAYED RELEASE ORAL at 06:01

## 2020-02-13 NOTE — PROGRESS NOTES
Pt less isolative than past several days  Pt reports moderate headache and given ibuprofen 600mg po prn for same at 14:03  Pt denies Si's, HI's, AH's, VH's  Pt reported "I was going to spend 28 days here because my blood pressures have been uncontrolled and I could feel it change even when I was at rehab"  Will continue to monitor and maintain CIWA protocol
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
## 2020-02-13 NOTE — PROGRESS NOTES
02/13/20 1400   Activity/Group Checklist   Group   (recovery group)   Attendance Attended   Attendance Duration (min) 46-60   Interactions Interacted appropriately   Affect/Mood Blunted/flat   Goals Achieved Able to listen to others   Coping skills

## 2020-02-13 NOTE — PLAN OF CARE
Problem: Potential for Falls  Goal: Patient will remain free of falls  Description  INTERVENTIONS:  - Assess patient frequently for physical needs  -  Identify cognitive and physical deficits and behaviors that affect risk of falls    -  [place] fall precautions as indicated by assessment   - Educate patient/family on patient safety including physical limitations  - Instruct patient to call for assistance with activity based on assessment  - Modify environment to reduce risk of injury  - Consider OT/PT consult to assist with strengthening/mobility  Outcome: Progressing     Problem: Risk for Self Injury/Neglect  Goal: Treatment Goal: Remain safe during length of stay, learn and adopt new coping skills, and be free of self-injurious ideation, impulses and acts at the time of discharge  Outcome: Progressing  Goal: Verbalize thoughts and feelings  Description  Interventions:  - Assess and re-assess patient's lethality and potential for self-injury  - Engage patient in 1:1 interactions, daily, for a minimum of 15 minutes  - Encourage patient to express feelings, fears, frustrations, hopes  - Establish rapport/trust with patient   Outcome: Progressing  Goal: Refrain from harming self  Description  Interventions:  - Monitor patient closely, per order  - Develop a trusting relationship  - Supervise medication ingestion, monitor effects and side effects   Outcome: Progressing  Goal: Recognize maladaptive responses and adopt new coping mechanisms  Outcome: Progressing  Goal: Complete daily ADLs, including personal hygiene independently, as able  Description  Interventions:  - Observe, teach, and assist patient with ADLS  - Monitor and promote a balance of rest/activity, with adequate nutrition and elimination  Outcome: Progressing     Problem: Depression  Goal: Treatment Goal: Demonstrate behavioral control of depressive symptoms, verbalize feelings of improved mood/affect, and adopt new coping skills prior to discharge  Outcome: Progressing  Goal: Verbalize thoughts and feelings  Description  Interventions:  - Assess and re-assess patient's level of risk   - Engage patient in 1:1 interactions, daily, for a minimum of 15 minutes   - Encourage patient to express feelings, fears, frustrations, hopes   Outcome: Progressing  Goal: Refrain from isolation  Description  Interventions:  - Develop a trusting relationship   - Encourage socialization   Outcome: Progressing  Goal: Refrain from self-neglect  Outcome: Progressing     Problem: Anxiety  Goal: Anxiety is at manageable level  Description  Interventions:  - Assess and monitor patient's anxiety level  - Monitor for signs and symptoms (heart palpitations, chest pain, shortness of breath, headaches, nausea, feeling jumpy, restlessness, irritable, apprehensive)  - Collaborate with interdisciplinary team and initiate plan and interventions as ordered    - [place] patient to unit/surroundings  - Explain treatment plan  - Encourage participation in care  - Encourage verbalization of concerns/fears  - Identify coping mechanisms  - Assist in developing anxiety-reducing skills  - Administer/offer alternative therapies  - Limit or eliminate stimulants  Outcome: Progressing

## 2020-02-13 NOTE — TREATMENT TEAM
02/13/20 1000   Team Meeting   Meeting Type Daily Rounds   Initial Conference Date 02/13/20   Team Members Present   Team Members Present Nurse;; Other (Discipline and Name)   Physician Team Member Teri 37 Team Member TOMMY Mason 8   Social Work Team Member Sejal Mccoy   Other (Discipline and Name) Pam   Patient/Family Present   Patient Present No   Patient's Family Present No   Staff to encourage group participation   Maintain CIWA

## 2020-02-13 NOTE — NURSING NOTE
Patient has been isolative to room and bed throughout the evening  Patient was encouraged to get up to have snack but did not  Patient continues to appear drowsy  Patient denied anxiety, depression, SI/HI, AH/VH, and pain  Patient was pleasant on approach  Patient demonstrating no signs of acute alcohol withdrawal at this time  Will continue to monitor closely on fall and suicide precautions

## 2020-02-14 PROCEDURE — 99232 SBSQ HOSP IP/OBS MODERATE 35: CPT | Performed by: NURSE PRACTITIONER

## 2020-02-14 RX ORDER — VENLAFAXINE 25 MG/1
25 TABLET ORAL ONCE
Status: COMPLETED | OUTPATIENT
Start: 2020-02-15 | End: 2020-02-15

## 2020-02-14 RX ORDER — VENLAFAXINE 25 MG/1
25 TABLET ORAL ONCE
Status: DISCONTINUED | OUTPATIENT
Start: 2020-02-15 | End: 2020-02-14

## 2020-02-14 RX ADMIN — FOLIC ACID 1 MG: 1 TABLET ORAL at 08:20

## 2020-02-14 RX ADMIN — LITHIUM CARBONATE 300 MG: 300 CAPSULE, GELATIN COATED ORAL at 08:19

## 2020-02-14 RX ADMIN — NICOTINE 1 PATCH: 21 PATCH, EXTENDED RELEASE TRANSDERMAL at 08:23

## 2020-02-14 RX ADMIN — LITHIUM CARBONATE 300 MG: 300 CAPSULE, GELATIN COATED ORAL at 17:13

## 2020-02-14 RX ADMIN — LORAZEPAM 1 MG: 1 TABLET ORAL at 20:09

## 2020-02-14 RX ADMIN — VENLAFAXINE HYDROCHLORIDE 37.5 MG: 37.5 CAPSULE, EXTENDED RELEASE ORAL at 08:19

## 2020-02-14 RX ADMIN — GABAPENTIN 400 MG: 400 CAPSULE ORAL at 12:13

## 2020-02-14 RX ADMIN — ATORVASTATIN CALCIUM 40 MG: 40 TABLET, FILM COATED ORAL at 08:20

## 2020-02-14 RX ADMIN — LEVOTHYROXINE SODIUM 50 MCG: 50 TABLET ORAL at 06:16

## 2020-02-14 RX ADMIN — LURASIDONE HYDROCHLORIDE 80 MG: 80 TABLET, FILM COATED ORAL at 17:13

## 2020-02-14 RX ADMIN — GABAPENTIN 400 MG: 400 CAPSULE ORAL at 08:19

## 2020-02-14 RX ADMIN — Medication 1 TABLET: at 08:19

## 2020-02-14 RX ADMIN — LOSARTAN POTASSIUM 100 MG: 50 TABLET, FILM COATED ORAL at 08:19

## 2020-02-14 RX ADMIN — SUCRALFATE 1 G: 1 TABLET ORAL at 17:13

## 2020-02-14 RX ADMIN — DOXEPIN HYDROCHLORIDE 50 MG: 50 CAPSULE ORAL at 21:07

## 2020-02-14 RX ADMIN — AMLODIPINE BESYLATE 5 MG: 5 TABLET ORAL at 17:13

## 2020-02-14 RX ADMIN — AMLODIPINE BESYLATE 5 MG: 5 TABLET ORAL at 08:20

## 2020-02-14 RX ADMIN — PANTOPRAZOLE SODIUM 40 MG: 40 TABLET, DELAYED RELEASE ORAL at 06:16

## 2020-02-14 RX ADMIN — THIAMINE HCL TAB 100 MG 100 MG: 100 TAB at 08:19

## 2020-02-14 RX ADMIN — METOPROLOL TARTRATE 50 MG: 50 TABLET, FILM COATED ORAL at 09:06

## 2020-02-14 RX ADMIN — CLONIDINE HYDROCHLORIDE 0.1 MG: 0.1 TABLET ORAL at 17:13

## 2020-02-14 RX ADMIN — GABAPENTIN 600 MG: 300 CAPSULE ORAL at 21:07

## 2020-02-14 RX ADMIN — CLONIDINE HYDROCHLORIDE 0.1 MG: 0.1 TABLET ORAL at 08:19

## 2020-02-14 RX ADMIN — METOPROLOL TARTRATE 50 MG: 50 TABLET, FILM COATED ORAL at 21:07

## 2020-02-14 RX ADMIN — GABAPENTIN 400 MG: 400 CAPSULE ORAL at 17:14

## 2020-02-14 RX ADMIN — SUCRALFATE 1 G: 1 TABLET ORAL at 06:37

## 2020-02-14 RX ADMIN — SUCRALFATE 1 G: 1 TABLET ORAL at 12:24

## 2020-02-14 RX ADMIN — SUCRALFATE 1 G: 1 TABLET ORAL at 21:07

## 2020-02-14 NOTE — PROGRESS NOTES
Patient has been having a difficult cezar sleeping tonight as her room mate has been up all night, crying, going in and out of her room and bathroom numerous times, so patient was allowed to sleep in another room for the rest of shift  Patient was assessed two separate times during the night for alcohol withdrawal as per [facility] protocol, and patient scored a 1 each time for a barely perceptible amount of sweating, and no other S/S of withdrawal were noted each time  Next assessment is at 0730  Will continue to monitor

## 2020-02-14 NOTE — PROGRESS NOTES
Pt reported some anxiety about 5 minutes after receiving her dinner time medications  Pt said she will give her medications some time to work first and if she still has some anxiety then she will let us know  Pt has not reported any more anxiety since  Will continue to monitor

## 2020-02-14 NOTE — PROGRESS NOTES
Progress Note - Behavioral Health     Carmina Cortez 48 y o  female MRN: [id number]  Unit/Bed#: [facility] 382-01 Encounter: [id number]    Carmina Cortez was seen for continuing care and reviewed with treatment team   Pt reports feeling "Good" but has anxiety  She c/o cough for a few weeks but no other Sxs or fevers  Pt reported feeling good and she presently denies any depression, SI, HI, or psychotic symptoms  She also denies any alcohol cravings or withdrawal and does not believe she has a drinking problem so she does not want rehab or any form of alcohol counseling  She reports having a cough for a couple of weeks without any other symptoms  She knows her dog at home can cause her to have some allergies but is not certain why it is happening here  Also, she has not had a seizure In years     Per floor team, the Pt has reported cough  She has been calm, cooperative and medication compliant on unit  She minimizes her ETOH use by hx and was requesting discharge last week  However, the team was able to convince her to stay for further monitoring of Sxs  Team is watching her BPs carefully due to h/o HTN        Sleep:Good  Appetite: Good   Medication side effects: None per Pt    ROS: As per HPI    Labs/EKG/  (and a 12/11/2019 CXR which was normal)/ and a prior 5/23/2020 Head CT noncontrast: all Reviewed      Mental Status Evaluation:  Appearance:  Dressed, Fair hygiene, Good eye contact   Behavior:  Calm, cooperative, pleasant   Speech:  Clear, normal rate and volume   Mood:  Anxious   Affect:  Constricted   Thought Process:  Organized, Goal directed   Associations: Intact associations   Thought Content:  No verbalized delusions   Perceptual Disturbances: Pt denies any hallucinations or paranoia and does not appear to be responding to internal stimuli   Risk Potential: Pt presently denies SI or HI    Sensorium:  Self, birthday, Place, Day of the week, Month, Year   Memory:  short term memory grossly intact Consciousness:  alert, awake   Attention: Good   Insight:  Partial   Judgment: Partial   Gait/Station: Normal gait/station   Motor Activity: No abnormal movements     Vitals:    02/14/20 0700 02/14/20 1536 02/14/20 2107 02/15/20 0700   BP: (!) 156/102 155/90 132/92 149/92   BP Location: Left arm Left arm  Left arm   Pulse: 77 84 84 79   Resp: 16 16  18   Temp: 97 9 °F (36 6 °C) (!) 97 3 °F (36 3 °C)  98 2 °F (36 8 °C)   TempSrc: Probe Temporal     SpO2:       Weight:       Height:           Admission on 02/10/2020   Component Date Value    Sodium 02/10/2020 137     Potassium 02/10/2020 3 3*    Chloride 02/10/2020 98     CO2 02/10/2020 26     ANION GAP 02/10/2020 13     BUN 02/10/2020 11     Creatinine 02/10/2020 0 84     Glucose 02/10/2020 151*    Calcium 02/10/2020 9 6     AST 02/10/2020 77*    ALT 02/10/2020 93*    Alkaline Phosphatase 02/10/2020 116     Total Protein 02/10/2020 7 8     Albumin 02/10/2020 4 6     Total Bilirubin 02/10/2020 0 40     eGFR 02/10/2020 80     WBC 02/10/2020 11 10*    RBC 02/10/2020 4 41     Hemoglobin 02/10/2020 13 9     Hematocrit 02/10/2020 41 3     MCV 02/10/2020 94     MCH 02/10/2020 31 5     MCHC 02/10/2020 33 7     RDW 02/10/2020 14 7     MPV 02/10/2020 7 6*    Platelets 20/99/7933 290     Neutrophils Relative 02/10/2020 67*    Lymphocytes Relative 02/10/2020 24*    Monocytes Relative 02/10/2020 8     Eosinophils Relative 02/10/2020 0     Basophils Relative 02/10/2020 1     Neutrophils Absolute 02/10/2020 7 50     Lymphocytes Absolute 02/10/2020 2 60     Monocytes Absolute 02/10/2020 0 90     Eosinophils Absolute 02/10/2020 0 00     Basophils Absolute 02/10/2020 0 10     Protime 02/10/2020 9 6*    INR 02/10/2020 0 86*    PTT 02/10/2020 25     TSH 3RD GENERATON 02/10/2020 2 470     Ammonia 02/10/2020 <9*    Ethanol Lvl 02/10/2020 178*    Amph/Meth UR 02/10/2020 Negative     Barbiturate Ur 02/10/2020 Negative     Benzodiazepine Urine 02/10/2020 Negative     Cocaine Urine 02/10/2020 Negative     Methadone Urine 02/10/2020 Negative     Opiate Urine 02/10/2020 Negative     PCP Ur 02/10/2020 Negative     THC Urine 02/10/2020 Negative     Acetaminophen Level 69/24/4384 <53*    Salicylate Lvl 76/68/3336 <1 0*    EXT PREG TEST UR (Ref: N* 02/10/2020 negative     Control 02/10/2020 valid     Sodium 02/10/2020 138     Potassium 02/10/2020 4 9     Chloride 02/10/2020 101     CO2 02/10/2020 27     ANION GAP 02/10/2020 10     BUN 02/10/2020 11     Creatinine 02/10/2020 0 68     Glucose 02/10/2020 146*    Calcium 02/10/2020 9 0     eGFR 02/10/2020 101     Sodium 02/10/2020 140     Potassium 02/10/2020 4 0     Chloride 02/10/2020 106     CO2 02/10/2020 25     ANION GAP 02/10/2020 9     BUN 02/10/2020 11     Creatinine 02/10/2020 0 57*    Glucose 02/10/2020 132*    Calcium 02/10/2020 8 5     AST 02/10/2020 55*    ALT 02/10/2020 75*    Alkaline Phosphatase 02/10/2020 87     Total Protein 02/10/2020 6 8     Albumin 02/10/2020 3 8     Total Bilirubin 02/10/2020 0 30     eGFR 02/10/2020 107     Cholesterol 02/11/2020 168     Triglycerides 02/11/2020 168*    HDL, Direct 02/11/2020 58     LDL Calculated 02/11/2020 76     Non-HDL-Chol (CHOL-HDL) 02/11/2020 110     RPR 02/11/2020 Non-Reactive     Ventricular Rate 02/10/2020 94     Atrial Rate 02/10/2020 94     RI Interval 02/10/2020 240     QRSD Interval 02/10/2020 76     QT Interval 02/10/2020 370     QTC Interval 02/10/2020 462     P Axis 02/10/2020 43     QRS Axis 02/10/2020 6     T Wave Axis 02/10/2020 4     Ventricular Rate 02/11/2020 84     Atrial Rate 02/11/2020 84     RI Interval 02/11/2020 184     QRSD Interval 02/11/2020 68     QT Interval 02/11/2020 398     QTC Interval 02/11/2020 470     P Axis 02/11/2020 60     QRS Axis 02/11/2020 29     T Wave Mcclusky 02/11/2020 21     TSH 3RD GENERATON 02/12/2020 2 540     Lithium Lvl 02/15/2020 0 3* Progress Toward Goals: Based on today's interview and review of prior notes, Pt is making gradual progress but still does not recognize her alcoholism as a problem  I encouraged her to think about rehab or at the very least, D&A counseling/AA  Last Venlafaxine dose was this AM   Li+ level subtherapeutic but value may have been obtained a little too soon  Will retest again tomorrow before increasing the Li+  Continue the present regimen and start Loratadine 10mg qd for potential allergic cough  Also consider her antihypertensive medicines as possible culprits--though she reports being on them for years  If cough continues, will consider getting a CXR and consult SLIM  Tentative Discharge planned for this coming Monday  Repeat Li+ level tomorrow and get FA, Vit B12 levels  Repeat EKG to monitor QTc       Assessment/Plan   Principal Problem:    Bipolar I disorder, most recent episode depressed, severe without psychotic features ([facility] [address] )  Active Problems:    Essential hypertension    Acquired hypothyroidism    Alcohol use disorder, moderate, dependence ([facility])    Post-traumatic stress disorder, chronic    Hypercholesteremia    Tobacco abuse    Generalized anxiety disorder    Overdose of trazodone    Medical clearance for psychiatric admission      Recommended Treatment: Continue with pharmacotherapy, group therapy, milieu therapy and occupational therapy    The patient will be maintained on the following medications:    Current Facility-Administered Medications:  aluminum-magnesium hydroxide-simethicone 30 mL Oral Q4H PRN Stephany Street MD   amLODIPine 5 mg Oral BID Jaimee Dean MD   atorvastatin 40 mg Oral Daily Jaime Goodrich MD   benztropine 1 mg Intramuscular Q6H PRN Barbara Rodas MD   benztropine 1 mg Oral Q6H PRN Stephany Street MD   cloNIDine 0 1 mg Oral BID Jaime Goodrich MD   doxepin 50 mg Oral HS Jaime Goodrich MD   folic acid 1 mg Oral Daily Barbara Rodas MD gabapentin 400 mg Oral TID Leanora Herb, CRNP   gabapentin 600 mg Oral HS Darryle Crew, MD   guaiFENesin 200 mg Oral Q4H PRN Ruperto Dutta MD   haloperidol 5 mg Oral Q8H PRN Francoise Tyson MD   haloperidol lactate 5 mg Intramuscular Q6H PRN Francoise Tyson MD   ibuprofen 400 mg Oral Q8H PRN Darryle Crew, MD   ibuprofen 600 mg Oral Q8H PRN Darryle Crew, MD   ibuprofen 800 mg Oral Q8H PRN Darryle Crew, MD   levothyroxine 50 mcg Oral Early Morning Marisolorest Scheuermann, CRSLOAN   lithium carbonate 300 mg Oral BID Darryle Crew, MD   loratadine 10 mg Oral Daily Keyana Ellis PA-C   LORazepam 1 mg Intramuscular Q6H PRN Francoise Tyson MD   LORazepam 1 mg Oral Q6H PRN Leanora Herb, CRNP   LORazepam 2 mg Oral Q8H PRN Leanora Herb, CRNP   losartan 100 mg Oral Daily Ja Figueroa MD   lurasidone 80 mg Oral Daily With Emerson Turner MD   magnesium hydroxide 30 mL Oral Daily PRN Francoise Tyson MD   metoprolol tartrate 50 mg Oral Q12H Rachel Wiseman MD   multivitamin-minerals 1 tablet Oral Daily Francoise Tyson MD   nicotine 1 patch Transdermal Daily Francoise Tyson MD   nicotine polacrilex 2 mg Oral Q2H PRN Darryle Crew, MD   pantoprazole 40 mg Oral Early Morning Darryle Crew, MD   risperiDONE 1 mg Oral Q3H PRN Darryle Crew, MD   sucralfate 1 g Oral 4x Daily (AC & HS) Darryle Crew, MD   thiamine 100 mg Oral Daily Francoise Tyson MD   ziprasidone 20 mg Oral Q4H PRN Darryle Crew, MD   ziprasidone 20 mg Intramuscular Q4H PRN Darryle Crew, MD   zolpidem 5 mg Oral HS PRN Francoise Tyson MD       Risks, benefits and possible side effects of Medications:   Risks, benefits, and possible side effects of medications explained to patient and patient verbalizes understanding   Pt has h/o hysterectomy

## 2020-02-14 NOTE — PROGRESS NOTES
Patient received at 1900 on fall precautions and CIWA protocol, and was laying in bed half asleep upon approach, but woke up for author to assess for CIWA protocol and  assessment, and was pleasant with author  Patient scored a zero on her 1930 CIWA assessment as she is not exhibiting any S/S of withdrawal at this time, and stated to Robby Jarrett she has never got withdrawal symptoms in the past  Patient was isolative to her room tonight, only coming out for a few moments to get a snack and drink, and was not observed socializing with any peers at this time  Patient denies SI, HI, A/V hallucinations, pain, anxiety and depression  Patient was cooperative with assessment and medication administration, and continued on fall precautions and CIWA protocol, plus q7 minute checks for patient safety  Will continue to monitor and offer therapeutic support

## 2020-02-14 NOTE — PROGRESS NOTES
Progress Note - Behavioral Health   Tracy Lawson 48 y o  female MRN: [id number]  Unit/Bed#: U 379-02 Encounter: [id number]    Assessment/Plan   Principal Problem:    Bipolar I disorder, most recent episode depressed, severe without psychotic features ([address] )  Active Problems:    Essential hypertension    Acquired hypothyroidism    Alcohol use disorder, moderate, dependence (HCC)    Post-traumatic stress disorder, chronic    Hypercholesteremia    Tobacco abuse    Generalized anxiety disorder    Overdose of trazodone    Medical clearance for psychiatric admission      Subjective:Patient was seen today for continuation of care, records reviewed and  patient was discussed with the morning case review team  Connie Chaudhary remains calm and pleasant on approach, reported not sleeping well last night due to roommate's behavior, appetite is good, she is more visible and social on the unit and attend some groups  Connie Chaudhary denies alcohol withdrawal symptoms and CIWA score between 0-1, therefore CIWA protocol was discontinued  We talked about the importance of her being compliant with medications upon discharge and compliance with her outpatient appointments in order for symptoms monitoring, medications assessment and refilled  Connie Chaudhary denies endorsing any suicidal or homicidal ideation  Does not seem to be experiencing any manic or psychotic symptoms during our encounter  She remains medication compliance and denies any side effects from medications  Will continue Venlafaxine taper - last dose on 2/15  Lithium lab on 2/15  Tentative discharge on Monday  Vitals:  Vitals:    02/14/20 0700   BP: (!) 156/102   Pulse: 77   Resp: 16   Temp: 97 9 °F (36 6 °C)   SpO2:        Laboratory results:    I have personally reviewed all pertinent laboratory/tests results    Most Recent Labs:   Lab Results   Component Value Date    WBC 11 10 (H) 02/10/2020    RBC 4 41 02/10/2020    HGB 13 9 02/10/2020    HCT 41 3 02/10/2020  02/10/2020    RDW 14 7 02/10/2020    NEUTROABS 7 50 02/10/2020    SODIUM 140 02/10/2020    K 4 0 02/10/2020     02/10/2020    CO2 25 02/10/2020    BUN 11 02/10/2020    CREATININE 0 57 (L) 02/10/2020    GLUC 132 (H) 02/10/2020    GLUF 119 (H) 12/18/2019    CALCIUM 8 5 02/10/2020    AST 55 (H) 02/10/2020    ALT 75 (H) 02/10/2020    ALKPHOS 87 02/10/2020    TP 6 8 02/10/2020    ALB 3 8 02/10/2020    TBILI 0 30 02/10/2020    CHOLESTEROL 168 02/11/2020    HDL 58 02/11/2020    TRIG 168 (H) 02/11/2020    LDLCALC 76 02/11/2020    NONHDLC 110 02/11/2020    AMMONIA <9 (L) 02/10/2020    [id number] 2 540 02/12/2020    FREET4 0 87 05/26/2019    PREGSERUM Negative 11/15/2019    RPR Non-Reactive 02/11/2020    HGBA1C 5 4 11/15/2019     11/15/2019       Psychiatric Review of Systems:    Behavior over the last 24 hours:  progressing  Sleep: slept on and off  Appetite: normal  Medication side effects: No  ROS: no complaints, denies any shortness of breath or chest pain and all other systems are negative      Mental Status Evaluation:    Appearance:  casually dressed, dressed appropriately, adequate grooming   Behavior:  pleasant, cooperative, calm   Speech:  normal rate and volume   Mood:  improved   Affect:  slightly brighter   Thought Process:  coherent, goal directed   Associations: intact associations   Thought Content:  no overt delusions   Perceptual Disturbances: no auditory hallucinations, no visual hallucinations, denies when asked   Risk Potential: Suicidal ideation - None at present  Homicidal ideation - None at present  Potential for aggression - No   Sensorium:  oriented to person, place, time/date and situation   Memory:  recent and remote memory grossly intact   Consciousness:  alert and awake   Attention: attention span and concentration are age appropriate   Insight:  improving   Judgment: improving   Gait/Station: normal gait/station, normal balance   Motor Activity: no abnormal movements Progress Toward Goals:     Progressing    Assessment/Plan   Principal Problem:    Bipolar I disorder, most recent episode depressed, severe without psychotic features ([facility] [address] )  Active Problems:    Essential hypertension    Acquired hypothyroidism    Alcohol use disorder, moderate, dependence (HCC)    Post-traumatic stress disorder, chronic    Hypercholesteremia    Tobacco abuse    Generalized anxiety disorder    Overdose of trazodone    Medical clearance for psychiatric admission      Recommended Treatment: Treatment plan and medication changes discussed and per the attending physician the plan is: 1  Continue with group therapy, milieu therapy and occupational therapy  2  Behavioral Health checks every 7 minutes  3  Continue with current medication regimen  4  Will review labs in the a m   5  Disposition Planning: Rescinded 72 hours notice- tentative discharge on 2/17    Behavioral Health Medications: all current active meds have been reviewed        Current Facility-Administered Medications:  aluminum-magnesium hydroxide-simethicone 30 mL Oral Q4H PRN Vlad Street MD   amLODIPine 5 mg Oral BID Woody Costello MD   atorvastatin 40 mg Oral Daily Nikhil Knowles MD   benztropine 1 mg Intramuscular Q6H PRN Warren Delong MD   benztropine 1 mg Oral Q6H PRN Vlad Street MD   cloNIDine 0 1 mg Oral BID Nikhil Knowles MD   doxepin 50 mg Oral HS Nikhil Knowles MD   folic acid 1 mg Oral Daily [address] Street MD   gabapentin 400 mg Oral TID YONG Francois   gabapentin 600 mg Oral HS Nikhil Knowles MD   haloperidol 5 mg Oral Q8H PRN Vlad Street MD   haloperidol lactate 5 mg Intramuscular Q6H PRN Warren Delong MD   ibuprofen 400 mg Oral Q8H PRN Nikhil Knowles MD   ibuprofen 600 mg Oral Q8H PRN Nikhil Knowles MD   ibuprofen 800 mg Oral Q8H PRN Nikhil Knowles MD   levothyroxine 50 mcg Oral Early Morning YONG Delatorre   lithium carbonate 300 mg Oral BID Nikhil Knolwes MD   LORazepam 1 mg Intramuscular Q6H PRN Warren Delong MD   LORazepam 1 mg Oral Q6H PRN YONG Francois   LORazepam 2 mg Oral Q8H PRN YONG Francois   losartan 100 mg Oral Daily Woody Costello MD   lurasidone 80 mg Oral Daily With Clau Hawkins MD   magnesium hydroxide 30 mL Oral Daily PRN Warren Delong MD   metoprolol tartrate 50 mg Oral Q12H [address] Woody Costello MD   multivitamin-minerals 1 tablet Oral Daily Warren Delong MD   nicotine 1 patch Transdermal Daily Warren Delong MD   nicotine polacrilex 2 mg Oral Q2H PRN Nikhil Knowles MD   pantoprazole 40 mg Oral Early Morning Nikhil Knowles MD   risperiDONE 1 mg Oral Q3H PRN Nikhil Knowles MD   sucralfate 1 g Oral 4x Daily (AC & HS) Nikhil Knowles MD   thiamine 100 mg Oral Daily Warren Delong MD   [START ON 2/15/2020] venlafaxine 25 mg Oral Once YONG Francois   ziprasidone 20 mg Oral Q4H PRN Nikhil Knowles MD   ziprasidone 20 mg Intramuscular Q4H PRN Nikhil Knowles MD   zolpidem 5 mg Oral HS PRN Warren Delong MD       Risks / Benefits of Treatment:     Risks, benefits, and possible side effects of medications explained to patient and patient verbalizes understanding and agreement for treatment  Counseling / Coordination of Care:     Patient's progress reviewed with nursing staff  Medications, treatment progress and treatment plan reviewed with patient  Supportive counseling provided to the patient            YONG Francois

## 2020-02-15 DIAGNOSIS — F31.4 BIPOLAR AFFECTIVE DISORDER, DEPRESSED, SEVERE (HCC): ICD-10-CM

## 2020-02-15 LAB
ATRIAL RATE: 61 BPM
LITHIUM SERPL-SCNC: 0.3 MMOL/L (ref 0.6–1.2)
P AXIS: 26 DEGREES
PR INTERVAL: 214 MS
QRS AXIS: 24 DEGREES
QRSD INTERVAL: 66 MS
QT INTERVAL: 440 MS
QTC INTERVAL: 442 MS
T WAVE AXIS: 2 DEGREES
VENTRICULAR RATE: 61 BPM

## 2020-02-15 PROCEDURE — 93005 ELECTROCARDIOGRAM TRACING: CPT

## 2020-02-15 PROCEDURE — 80178 ASSAY OF LITHIUM: CPT | Performed by: NURSE PRACTITIONER

## 2020-02-15 PROCEDURE — 93010 ELECTROCARDIOGRAM REPORT: CPT | Performed by: INTERNAL MEDICINE

## 2020-02-15 PROCEDURE — NC001 PR NO CHARGE: Performed by: PHYSICIAN ASSISTANT

## 2020-02-15 RX ORDER — GUAIFENESIN 100 MG/5ML
200 SOLUTION ORAL EVERY 4 HOURS PRN
Status: DISCONTINUED | OUTPATIENT
Start: 2020-02-15 | End: 2020-02-17 | Stop reason: HOSPADM

## 2020-02-15 RX ORDER — LORATADINE 10 MG/1
10 TABLET ORAL DAILY
Status: DISCONTINUED | OUTPATIENT
Start: 2020-02-15 | End: 2020-02-17 | Stop reason: HOSPADM

## 2020-02-15 RX ADMIN — CLONIDINE HYDROCHLORIDE 0.1 MG: 0.1 TABLET ORAL at 17:52

## 2020-02-15 RX ADMIN — AMLODIPINE BESYLATE 5 MG: 5 TABLET ORAL at 08:06

## 2020-02-15 RX ADMIN — VENLAFAXINE 25 MG: 25 TABLET ORAL at 08:08

## 2020-02-15 RX ADMIN — METOPROLOL TARTRATE 50 MG: 50 TABLET, FILM COATED ORAL at 08:06

## 2020-02-15 RX ADMIN — GABAPENTIN 400 MG: 400 CAPSULE ORAL at 12:52

## 2020-02-15 RX ADMIN — CLONIDINE HYDROCHLORIDE 0.1 MG: 0.1 TABLET ORAL at 08:06

## 2020-02-15 RX ADMIN — SUCRALFATE 1 G: 1 TABLET ORAL at 21:07

## 2020-02-15 RX ADMIN — PANTOPRAZOLE SODIUM 40 MG: 40 TABLET, DELAYED RELEASE ORAL at 06:13

## 2020-02-15 RX ADMIN — GABAPENTIN 600 MG: 300 CAPSULE ORAL at 21:07

## 2020-02-15 RX ADMIN — FOLIC ACID 1 MG: 1 TABLET ORAL at 08:06

## 2020-02-15 RX ADMIN — Medication 1 TABLET: at 08:06

## 2020-02-15 RX ADMIN — LITHIUM CARBONATE 300 MG: 300 CAPSULE, GELATIN COATED ORAL at 17:52

## 2020-02-15 RX ADMIN — GUAIFENESIN 200 MG: 100 SOLUTION ORAL at 21:07

## 2020-02-15 RX ADMIN — GABAPENTIN 400 MG: 400 CAPSULE ORAL at 17:52

## 2020-02-15 RX ADMIN — DOXEPIN HYDROCHLORIDE 50 MG: 50 CAPSULE ORAL at 21:06

## 2020-02-15 RX ADMIN — GABAPENTIN 400 MG: 400 CAPSULE ORAL at 08:06

## 2020-02-15 RX ADMIN — ATORVASTATIN CALCIUM 40 MG: 40 TABLET, FILM COATED ORAL at 08:06

## 2020-02-15 RX ADMIN — MAGNESIUM HYDROXIDE 30 ML: 400 SUSPENSION ORAL at 11:28

## 2020-02-15 RX ADMIN — LORATADINE 10 MG: 10 TABLET ORAL at 14:17

## 2020-02-15 RX ADMIN — LOSARTAN POTASSIUM 100 MG: 50 TABLET, FILM COATED ORAL at 08:05

## 2020-02-15 RX ADMIN — ALUMINUM HYDROXIDE, MAGNESIUM HYDROXIDE, AND SIMETHICONE 30 ML: 200; 200; 20 SUSPENSION ORAL at 11:31

## 2020-02-15 RX ADMIN — LURASIDONE HYDROCHLORIDE 80 MG: 80 TABLET, FILM COATED ORAL at 17:52

## 2020-02-15 RX ADMIN — NICOTINE 1 PATCH: 21 PATCH, EXTENDED RELEASE TRANSDERMAL at 08:09

## 2020-02-15 RX ADMIN — SUCRALFATE 1 G: 1 TABLET ORAL at 16:43

## 2020-02-15 RX ADMIN — AMLODIPINE BESYLATE 5 MG: 5 TABLET ORAL at 17:52

## 2020-02-15 RX ADMIN — LEVOTHYROXINE SODIUM 50 MCG: 50 TABLET ORAL at 06:13

## 2020-02-15 RX ADMIN — METOPROLOL TARTRATE 50 MG: 50 TABLET, FILM COATED ORAL at 21:05

## 2020-02-15 RX ADMIN — THIAMINE HCL TAB 100 MG 100 MG: 100 TAB at 08:06

## 2020-02-15 RX ADMIN — SUCRALFATE 1 G: 1 TABLET ORAL at 10:56

## 2020-02-15 RX ADMIN — SUCRALFATE 1 G: 1 TABLET ORAL at 06:13

## 2020-02-15 RX ADMIN — ZOLPIDEM TARTRATE 5 MG: 5 TABLET, COATED ORAL at 21:07

## 2020-02-15 RX ADMIN — LITHIUM CARBONATE 300 MG: 300 CAPSULE, GELATIN COATED ORAL at 08:06

## 2020-02-15 NOTE — PROGRESS NOTES
Progress Note - Behavioral Health     Mellissa Tolliver 48 y o  female MRN: [id number]  Unit/Bed#: [facility] 382-01 Encounter: [id number]    Mellissa Tolliver was seen for continuing care and reviewed with treatment team   Pt reported "Anxiety" more at night, but she cannot identify specific cause  She presently denies depression, SI, HI, psychotic symptoms, or any alcohol withdrawal or cravings  She states that when she took MOM yesterday, she noticed that her cough went away  It seemed to come back later that night but the Robitussin was not helpful  Per floor team, Pt remains calm, cooperative, and medication compliant      Sleep:Good  Appetite: Good   Medication side effects: None per Pt    ROS: As per HPI    Labs/repeat EKG: Reviewed    Mental Status Evaluation:  Appearance:  Dressed, Fair hygiene, Good eye contact   Behavior:  Calm, cooperative, pleasant   Speech:  Clear, normal rate and volume   Mood:  Anxious   Affect:  Constricted   Thought Process:  Organized, Goal directed   Associations: Intact associations   Thought Content:  No verbalized delusions   Perceptual Disturbances: Pt denies any hallucinations or paranoia and does not appear to be responding to internal stimuli   Risk Potential: Pt presently denies SI or HI    Sensorium:  Self, birthday, Place, Day of the week, Month, Year   Memory:  short term memory grossly intact   Consciousness:  alert, awake   Attention: Good   Insight:  Partial   Judgment: Partial   Gait/Station: Normal gait/station   Motor Activity: No abnormal movements     Vitals:    02/15/20 0700 02/15/20 1500 02/15/20 2100 02/16/20 0802   BP: 149/92 138/92 148/99 128/87   BP Location: Left arm Left arm  Left arm   Pulse: 79 71 86 84   Resp: 18   16   Temp: 98 2 °F (36 8 °C) 97 5 °F (36 4 °C)  97 5 °F (36 4 °C)   TempSrc:  Temporal  Temporal   SpO2:       Weight:    89 8 kg (198 lb)   Height:           Admission on 02/10/2020   Component Date Value    Sodium 02/10/2020 137     Potassium 02/10/2020 3 3*    Chloride 02/10/2020 98     CO2 02/10/2020 26     ANION GAP 02/10/2020 13     BUN 02/10/2020 11     Creatinine 02/10/2020 0 84     Glucose 02/10/2020 151*    Calcium 02/10/2020 9 6     AST 02/10/2020 77*    ALT 02/10/2020 93*    Alkaline Phosphatase 02/10/2020 116     Total Protein 02/10/2020 7 8     Albumin 02/10/2020 4 6     Total Bilirubin 02/10/2020 0 40     eGFR 02/10/2020 80     WBC 02/10/2020 11 10*    RBC 02/10/2020 4 41     Hemoglobin 02/10/2020 13 9     Hematocrit 02/10/2020 41 3     MCV 02/10/2020 94     MCH 02/10/2020 31 5     MCHC 02/10/2020 33 7     RDW 02/10/2020 14 7     MPV 02/10/2020 7 6*    Platelets 96/58/1038 290     Neutrophils Relative 02/10/2020 67*    Lymphocytes Relative 02/10/2020 24*    Monocytes Relative 02/10/2020 8     Eosinophils Relative 02/10/2020 0     Basophils Relative 02/10/2020 1     Neutrophils Absolute 02/10/2020 7 50     Lymphocytes Absolute 02/10/2020 2 60     Monocytes Absolute 02/10/2020 0 90     Eosinophils Absolute 02/10/2020 0 00     Basophils Absolute 02/10/2020 0 10     Protime 02/10/2020 9 6*    INR 02/10/2020 0 86*    PTT 02/10/2020 25     TSH 3RD GENERATON 02/10/2020 2 470     Ammonia 02/10/2020 <9*    Ethanol Lvl 02/10/2020 178*    Amph/Meth UR 02/10/2020 Negative     Barbiturate Ur 02/10/2020 Negative     Benzodiazepine Urine 02/10/2020 Negative     Cocaine Urine 02/10/2020 Negative     Methadone Urine 02/10/2020 Negative     Opiate Urine 02/10/2020 Negative     PCP Ur 02/10/2020 Negative     THC Urine 02/10/2020 Negative     Acetaminophen Level 58/02/1623 <43*    Salicylate Lvl 69/12/1356 <1 0*    EXT PREG TEST UR (Ref: N* 02/10/2020 negative     Control 02/10/2020 valid     Sodium 02/10/2020 138     Potassium 02/10/2020 4 9     Chloride 02/10/2020 101     CO2 02/10/2020 27     ANION GAP 02/10/2020 10     BUN 02/10/2020 11     Creatinine 02/10/2020 0 68     Glucose 02/10/2020 146*    Calcium 02/10/2020 9 0     eGFR 02/10/2020 101     Sodium 02/10/2020 140     Potassium 02/10/2020 4 0     Chloride 02/10/2020 106     CO2 02/10/2020 25     ANION GAP 02/10/2020 9     BUN 02/10/2020 11     Creatinine 02/10/2020 0 57*    Glucose 02/10/2020 132*    Calcium 02/10/2020 8 5     AST 02/10/2020 55*    ALT 02/10/2020 75*    Alkaline Phosphatase 02/10/2020 87     Total Protein 02/10/2020 6 8     Albumin 02/10/2020 3 8     Total Bilirubin 02/10/2020 0 30     eGFR 02/10/2020 107     Cholesterol 02/11/2020 168     Triglycerides 02/11/2020 168*    HDL, Direct 02/11/2020 58     LDL Calculated 02/11/2020 76     Non-HDL-Chol (CHOL-HDL) 02/11/2020 110     RPR 02/11/2020 Non-Reactive     Ventricular Rate 02/10/2020 94     Atrial Rate 02/10/2020 94     AL Interval 02/10/2020 240     QRSD Interval 02/10/2020 76     QT Interval 02/10/2020 370     QTC Interval 02/10/2020 462     P Axis 02/10/2020 43     QRS Axis 02/10/2020 6     T Wave Axis 02/10/2020 4     Ventricular Rate 02/11/2020 84     Atrial Rate 02/11/2020 84     AL Interval 02/11/2020 184     QRSD Interval 02/11/2020 68     QT Interval 02/11/2020 398     QTC Interval 02/11/2020 470     P Axis 02/11/2020 60     QRS Axis 02/11/2020 29     T Wave [place] 02/11/2020 21     TSH 3RD GENERATON 02/12/2020 2 540     Lithium Lvl 02/15/2020 0 3*    Ventricular Rate 02/15/2020 61     Atrial Rate 02/15/2020 61     AL Interval 02/15/2020 214     QRSD Interval 02/15/2020 66     QT Interval 02/15/2020 440     QTC Interval 02/15/2020 442     P Axis 02/15/2020 26     QRS Axis 02/15/2020 24     T Wave Axis 02/15/2020 2     Lithium Lvl 02/16/2020 0 3*       Progress Toward Goals:  Patient is gradually progressing, still minimizes ETOH use  Li+ level is still subtherapeutic and will increase the dose by 150mg qhs, and retest Li+ and BMP during the week  QTc improved on repeat EKG      Assessment/Plan   Principal Problem:    Bipolar I disorder, most recent episode depressed, severe without psychotic features ([address] )  Active Problems:    Essential hypertension    Acquired hypothyroidism    Alcohol use disorder, moderate, dependence (HCC)    Post-traumatic stress disorder, chronic    Hypercholesteremia    Tobacco abuse    Generalized anxiety disorder    Overdose of trazodone    Medical clearance for psychiatric admission      Recommended Treatment: Continue with pharmacotherapy, group therapy, milieu therapy and occupational therapy    The patient will be maintained on the following medications:    Current Facility-Administered Medications:  aluminum-magnesium hydroxide-simethicone 30 mL Oral Q4H PRN Zenon Street MD   amLODIPine 5 mg Oral BID Shirley Watters MD   atorvastatin 40 mg Oral Daily Magno Medina MD   benztropine 1 mg Intramuscular Q6H PRN Sofi Jones MD   benztropine 1 mg Oral Q6H PRN Zenon Street MD   cloNIDine 0 1 mg Oral BID Magno Medina MD   doxepin 50 mg Oral HS Magno Medina MD   folic acid 1 mg Oral Daily Sofi Jones MD   gabapentin 400 mg Oral TID YONG Monroe   gabapentin 600 mg Oral HS Magno Medina MD   guaiFENesin 200 mg Oral Q4H PRN James Shrestha MD   haloperidol 5 mg Oral Q8H PRN Vignesh Street MD   haloperidol lactate 5 mg Intramuscular Q6H PRN Zenon Street MD   ibuprofen 400 mg Oral Q8H PRN Magno Medina MD   ibuprofen 600 mg Oral Q8H PRN Magno Medina MD   ibuprofen 800 mg Oral Q8H PRN Magno Medina MD   levothyroxine 50 mcg Oral Early Morning YONG Lowery   [START ON 2/17/2020] lithium carbonate 300 mg Oral QAM Keyana Ellis PA-C   lithium carbonate 450 mg Oral HS Keyana Ellis PA-C   loratadine 10 mg Oral Daily Keyana Ellis PA-C   LORazepam 1 mg Intramuscular Q6H PRN Sofi Jones MD   LORazepam 1 mg Oral Q6H PRN YONG Monroe   LORazepam 2 mg Oral Q8H PRN YONG Monroe losartan 100 mg Oral Daily Magda Martel MD   lurasidone 80 mg Oral Daily With Adelina Robison MD   magnesium hydroxide 30 mL Oral Daily PRN Shasta Thapa MD   metoprolol tartrate 50 mg Oral Q12H [address] Magda Martel MD   multivitamin-minerals 1 tablet Oral Daily Shasta Thapa MD   nicotine 1 patch Transdermal Daily Shasta Thapa MD   nicotine polacrilex 2 mg Oral Q2H PRN Dmitri Khan MD   pantoprazole 40 mg Oral Early Morning Dmitri Khan MD   risperiDONE 1 mg Oral Q3H PRN Dmitri Khan MD   sucralfate 1 g Oral 4x Daily (AC & HS) Dmitri Khan MD   thiamine 100 mg Oral Daily Shasta Thapa MD   ziprasidone 20 mg Oral Q4H PRN Dmitri Khan MD   ziprasidone 20 mg Intramuscular Q4H PRN Dmitri Khan MD   zolpidem 5 mg Oral HS PRN Shasta Thapa MD       Risks, benefits and possible side effects of Medications:   Risks, benefits, and possible side effects of medications explained to patient and patient verbalizes understanding  Pt has h/o hysterectomy

## 2020-02-15 NOTE — NURSING NOTE
Patient remained visible on unit throughout the evening  Pleasant and cooperative with unit routine  PRN ativan effective  HS medication compliant  Denied any unmet needs  Verbalized an understanding of scheduled medications  Will monitor

## 2020-02-15 NOTE — PROGRESS NOTES
Pt visible in milieu throughout shift  Pleasant and social  Pt denies SI  Has appeared calm throughout shift  No complaints of anxiety or requests for prn medication  Compliant with scheduled medications  No issues/concerns

## 2020-02-15 NOTE — PROGRESS NOTES
Pt awake at start of shift  Pt calm and pleasant during interaction  Pt c/o cough that keeps her awake at night and requesting to see doctor  Pt afebrile, denies any other symptoms, not observed to be coughing presently  Pt visible in milieu and social with peers  Compliant with AM medications

## 2020-02-16 LAB — LITHIUM SERPL-SCNC: 0.3 MMOL/L (ref 0.6–1.2)

## 2020-02-16 PROCEDURE — 99232 SBSQ HOSP IP/OBS MODERATE 35: CPT | Performed by: PHYSICIAN ASSISTANT

## 2020-02-16 PROCEDURE — 80178 ASSAY OF LITHIUM: CPT | Performed by: PHYSICIAN ASSISTANT

## 2020-02-16 PROCEDURE — 82607 VITAMIN B-12: CPT | Performed by: PHYSICIAN ASSISTANT

## 2020-02-16 PROCEDURE — 82746 ASSAY OF FOLIC ACID SERUM: CPT | Performed by: PHYSICIAN ASSISTANT

## 2020-02-16 RX ORDER — LITHIUM CARBONATE 300 MG/1
300 CAPSULE ORAL EVERY MORNING
Status: DISCONTINUED | OUTPATIENT
Start: 2020-02-17 | End: 2020-02-17 | Stop reason: HOSPADM

## 2020-02-16 RX ADMIN — LORAZEPAM 1 MG: 1 TABLET ORAL at 19:48

## 2020-02-16 RX ADMIN — ALUMINUM HYDROXIDE, MAGNESIUM HYDROXIDE, AND SIMETHICONE 30 ML: 200; 200; 20 SUSPENSION ORAL at 21:11

## 2020-02-16 RX ADMIN — SUCRALFATE 1 G: 1 TABLET ORAL at 15:59

## 2020-02-16 RX ADMIN — GABAPENTIN 400 MG: 400 CAPSULE ORAL at 08:48

## 2020-02-16 RX ADMIN — CLONIDINE HYDROCHLORIDE 0.1 MG: 0.1 TABLET ORAL at 08:47

## 2020-02-16 RX ADMIN — LITHIUM CARBONATE 450 MG: 300 CAPSULE, GELATIN COATED ORAL at 21:10

## 2020-02-16 RX ADMIN — SUCRALFATE 1 G: 1 TABLET ORAL at 06:34

## 2020-02-16 RX ADMIN — Medication 1 TABLET: at 08:47

## 2020-02-16 RX ADMIN — AMLODIPINE BESYLATE 5 MG: 5 TABLET ORAL at 17:38

## 2020-02-16 RX ADMIN — GUAIFENESIN 200 MG: 100 SOLUTION ORAL at 21:36

## 2020-02-16 RX ADMIN — GABAPENTIN 600 MG: 300 CAPSULE ORAL at 21:10

## 2020-02-16 RX ADMIN — METOPROLOL TARTRATE 50 MG: 50 TABLET, FILM COATED ORAL at 08:48

## 2020-02-16 RX ADMIN — THIAMINE HCL TAB 100 MG 100 MG: 100 TAB at 08:48

## 2020-02-16 RX ADMIN — LITHIUM CARBONATE 300 MG: 300 CAPSULE, GELATIN COATED ORAL at 08:48

## 2020-02-16 RX ADMIN — LORATADINE 10 MG: 10 TABLET ORAL at 08:48

## 2020-02-16 RX ADMIN — LOSARTAN POTASSIUM 100 MG: 50 TABLET, FILM COATED ORAL at 08:48

## 2020-02-16 RX ADMIN — GABAPENTIN 400 MG: 400 CAPSULE ORAL at 17:38

## 2020-02-16 RX ADMIN — PANTOPRAZOLE SODIUM 40 MG: 40 TABLET, DELAYED RELEASE ORAL at 06:28

## 2020-02-16 RX ADMIN — LURASIDONE HYDROCHLORIDE 80 MG: 80 TABLET, FILM COATED ORAL at 17:37

## 2020-02-16 RX ADMIN — ALUMINUM HYDROXIDE, MAGNESIUM HYDROXIDE, AND SIMETHICONE 30 ML: 200; 200; 20 SUSPENSION ORAL at 10:33

## 2020-02-16 RX ADMIN — LEVOTHYROXINE SODIUM 50 MCG: 50 TABLET ORAL at 06:28

## 2020-02-16 RX ADMIN — FOLIC ACID 1 MG: 1 TABLET ORAL at 08:48

## 2020-02-16 RX ADMIN — SUCRALFATE 1 G: 1 TABLET ORAL at 11:45

## 2020-02-16 RX ADMIN — ATORVASTATIN CALCIUM 40 MG: 40 TABLET, FILM COATED ORAL at 08:47

## 2020-02-16 RX ADMIN — DOXEPIN HYDROCHLORIDE 50 MG: 50 CAPSULE ORAL at 21:10

## 2020-02-16 RX ADMIN — ZOLPIDEM TARTRATE 5 MG: 5 TABLET, COATED ORAL at 21:10

## 2020-02-16 RX ADMIN — AMLODIPINE BESYLATE 5 MG: 5 TABLET ORAL at 08:47

## 2020-02-16 RX ADMIN — GABAPENTIN 400 MG: 400 CAPSULE ORAL at 13:39

## 2020-02-16 RX ADMIN — NICOTINE 1 PATCH: 21 PATCH, EXTENDED RELEASE TRANSDERMAL at 08:50

## 2020-02-16 RX ADMIN — CLONIDINE HYDROCHLORIDE 0.1 MG: 0.1 TABLET ORAL at 17:38

## 2020-02-16 RX ADMIN — SUCRALFATE 1 G: 1 TABLET ORAL at 21:10

## 2020-02-16 RX ADMIN — METOPROLOL TARTRATE 50 MG: 50 TABLET, FILM COATED ORAL at 21:10

## 2020-02-16 NOTE — DISCHARGE SUMMARY
Discharge Summary - [address] 48 y o  female MRN: [id number]  Unit/Bed#: [facility] 382-01 Encounter: [id number]     Admission Date: 2/10/2020         Discharge Date: 2/17/2020    Attending Psychiatrist: Kymberly Hernandez MD    Reason for Admission/HPI:     History of Present Illness     Copied and pasted as per Dr Lakeisha Hahn H&P  Patient is a 48 y o  female presents with Signs of suicidal potential   Patient was admitted to psychiatric unit on a voluntarily 201 commitment basis      Primary complaints include: depression worse, feeling depressed and feeling suicidal   Onset of symptoms was abrupt starting 1 day ago with unchanged course since that time  Psychosocial Stressors: family, financial and health  Patient stated she had recently been in Rehab for alcohol abuse and had been sober for close to a month but recently relapsed on alcohol and triggered her depression and her suicide attempt  She stated the decision to OD on trazodone was impulsive and regrets her actions  She stated her current stressors is her relationship with her oldest son as she wishes she could have a closer relationship with him especially now that his GF is pregnant  Patient has hx of Bipolar disorder and goes to [facility] for counseling and medication management  She stated on her last visit she had been started on lithium  I agree with starting lithium for mood stabilization and continue Latuda 80 mg  Will d/c Lamictal and gradually taper off Venlafaxine  She denies A/V hallucinations and current denies SI or HI   Continue to monitor for alcohol withdrawal       Historical Information     Past Psychiatric History:     Past Inpatient Psychiatric Treatment:  Multiple past inpatient psychiatric admissions  Past Outpatient Psychiatric Treatment: No history of past outpatient psychiatric treatment  Past Suicide attempts: no  Past Violent behavior: no  Past Psychiatric medication trials: several past medication trails    Substance Abuse History:    Social History     Tobacco History     Smoking Status  Current Every Day Smoker Smoking Frequency  1 pack/day for 25 years (25 pk yrs) Smoking Tobacco Type  Cigarettes    Smokeless Tobacco Use  Never Used          Alcohol History     Alcohol Use Status  Yes Drinks/Week  21 Cans of beer per week Amount  21 0 standard drinks of alcohol/wk Comment  As of 11/14, reports 7 large beers about 2-3 times a week  Drug Use     Drug Use Status  No          Sexual Activity     Sexually Active  Not Currently          Activities of Daily Living    Not Asked                 Alcohol use: moderate 21 cans of beer drinks per day    Recreational drug use: denies      Cocaine:  Denies use   Heroin:  Denies use   Marijuana:  Denies use   Other drugs:  Denies use   Longest clean time: na    History of Inpatient/Outpatient rehabilitation program: no  Smoking history: 1 pack per day  Use of Caffeine: denies use    Family Psychiatric History:     Psychiatric Illness: no family history of psychiatric illness  Substance Abuse: no  Suicide Attempt: no    Social History:    Education: high school diploma/GED  Learning Disabilities: none  Marital History: single  Living arrangement, social support: The patient lives in home with sons  Occupational History: on permanent disability  Functioning Relationships: poor relationship with children    Legal History: none   History: None      Traumatic History:     Abuse: none  Other Traumatic Events:none     Past Medical History:    History of seizures: no  History of head injury with loss of consciousness: no    Past Medical History:   Diagnosis Date    Alcohol abuse     [facility]     Anxiety     [facility] disorder [facility]     Bowel obstruction (HCC)     Depression     Gastritis     Head injury     Heart palpitations     Hyperlipidemia     Hypertension     Hypothyroidism     PTSD (post-traumatic stress disorder)     Seizure ([facility]     Sleep difficulties     Suicide attempt [facility])        Meds/Allergies     all current active meds have been reviewed and current meds:   Current Facility-Administered Medications   Medication Dose Route Frequency    aluminum-magnesium hydroxide-simethicone (MYLANTA) 200-200-20 mg/5 mL oral suspension 30 mL  30 mL Oral Q4H PRN    amLODIPine (NORVASC) tablet 5 mg  5 mg Oral BID    atorvastatin (LIPITOR) tablet 40 mg  40 mg Oral Daily    benztropine (COGENTIN) injection 1 mg  1 mg Intramuscular Q6H PRN    benztropine (COGENTIN) tablet 1 mg  1 mg Oral Q6H PRN    cloNIDine (CATAPRES) tablet 0 1 mg  0 1 mg Oral BID    doxepin (SINEquan) capsule 50 mg  50 mg Oral HS    folic acid (FOLVITE) tablet 1 mg  1 mg Oral Daily    gabapentin (NEURONTIN) capsule 400 mg  400 mg Oral TID    gabapentin (NEURONTIN) capsule 600 mg  600 mg Oral HS    guaiFENesin (ROBITUSSIN) oral solution 200 mg  200 mg Oral Q4H PRN    haloperidol (HALDOL) tablet 5 mg  5 mg Oral Q8H PRN    haloperidol lactate (HALDOL) injection 5 mg  5 mg Intramuscular Q6H PRN    ibuprofen (MOTRIN) tablet 400 mg  400 mg Oral Q8H PRN    ibuprofen (MOTRIN) tablet 600 mg  600 mg Oral Q8H PRN    ibuprofen (MOTRIN) tablet 800 mg  800 mg Oral Q8H PRN    levothyroxine tablet 50 mcg  50 mcg Oral Early Morning    lithium carbonate capsule 300 mg  300 mg Oral BID    loratadine (CLARITIN) tablet 10 mg  10 mg Oral Daily    LORazepam (ATIVAN) injection 1 mg  1 mg Intramuscular Q6H PRN    LORazepam (ATIVAN) tablet 1 mg  1 mg Oral Q6H PRN    LORazepam (ATIVAN) tablet 2 mg  2 mg Oral Q8H PRN    losartan (COZAAR) tablet 100 mg  100 mg Oral Daily    lurasidone (LATUDA) tablet 80 mg  80 mg Oral Daily With Dinner    magnesium hydroxide (MILK OF MAGNESIA) 400 mg/5 mL oral suspension 30 mL  30 mL Oral Daily PRN    metoprolol tartrate (LOPRESSOR) tablet 50 mg  50 mg Oral Q12H [address]    multivitamin-minerals (CENTRUM) tablet 1 tablet  1 tablet Oral Daily    nicotine (NICODERM CQ) 21 mg/24 hr TD 24 hr patch 1 patch  1 patch Transdermal Daily    nicotine polacrilex (NICORETTE) gum 2 mg  2 mg Oral Q2H PRN    pantoprazole (PROTONIX) EC tablet 40 mg  40 mg Oral Early Morning    risperiDONE (RisperDAL M-TABS) dispersible tablet 1 mg  1 mg Oral Q3H PRN    sucralfate (CARAFATE) tablet 1 g  1 g Oral 4x Daily (AC & HS)    thiamine (VITAMIN B1) tablet 100 mg  100 mg Oral Daily    ziprasidone (GEODON) capsule 20 mg  20 mg Oral Q4H PRN    ziprasidone (GEODON) IM injection 20 mg  20 mg Intramuscular Q4H PRN    zolpidem (AMBIEN) tablet 5 mg  5 mg Oral HS PRN       Allergies   Allergen Reactions    Latex Rash       Objective     Vital signs in last 24 hours:  Temp:  [97 5 °F (36 4 °C)-98 2 °F (36 8 °C)] 97 5 °F (36 4 °C)  HR:  [71-86] 86  Resp:  [18] 18  BP: (138-149)/(92-99) 148/99    No intake or output data in the 24 hours ending 02/16/20 0535    Hospital Course: The patient was admitted to the inpatient psychiatric unit and started on every 15 minutes precautions  During the hospitalization the patient was attending individual therapy, group therapy, milieu therapy and occupational therapy  Psychiatric medications were titrated over the hospital stay  To address depression, depressive symptoms, mood instability, anxiety symptoms and suicidal ideation the patient was started on mood stabilizer Lithium 300 mg/daily, Lithium 450 mg at bedtime and Neurontin 400 mg/TID, Neurontin 600 mg at bedtime, antipsychotic medication Latuda 80 mg/daily and hypnotic medication Doxepin 50 mg at bedtime, Clonidine 0 1 mg/BID for hypertention & Atarax 25 mg as needed every 6 hours for anxiety symptoms  Medication doses were titrated during the hospital course   Prior to beginning of treatment medications risks and benefits and possible side effects including risk of kidney impairment related to treatment with Lithium and risk of parkinsonian symptoms, Tardive Dyskinesia and metabolic syndrome related to treatment with antipsychotic medications were reviewed with the patient  The patient verbalized understanding and agreement for treatment  Patient's symptoms improved gradually over the hospital course  At the end of treatment the patient was doing well  Mood was stable at the time of discharge  The patient denied suicidal ideation, intent or plan at the time of discharge and denied homicidal ideation, intent or plan at the time of discharge  There was no overt psychosis at the time of discharge  Sleep and appetite were improved  The patient was tolerating medications and was not reporting any significant side effects at the time of discharge  Since Terrie Cueva was doing well at the end of the hospitalization, treatment team felt that Terrie Cueva could be safely discharged to outpatient care  The outpatient follow up with  [facility] was arranged by the unit  upon discharge      Mental Status at Time of Discharge:     Appearance:  casually dressed, dressed appropriately, adequate grooming   Behavior:  pleasant, cooperative, calm   Speech:  normal rate and volume   Mood:  improved   Affect:  slightly brighter   Thought Process:  coherent, goal directed   Thought Content:  no overt delusions   Perceptual Disturbances: no auditory hallucinations, no visual hallucinations   Risk Potential: Suicidal ideation - None at present  Homicidal ideation - None  Potential for aggression - No   Sensorium:  person, place, time/date and situation   Cognition:  recent and remote memory grossly intact   Consciousness:  alert and awake    Attention: attention span and concentration are age appropriate   Insight:  improved   Judgment: improved   Gait/Station: normal gait/station, normal balance   Motor Activity: no abnormal movements     Admission Diagnosis:  Principal Problem:    Bipolar I disorder, most recent episode depressed, severe without psychotic features ([facility] )  Active Problems:    Essential hypertension    Acquired hypothyroidism    Alcohol use disorder, moderate, dependence (HCC)    Post-traumatic stress disorder, chronic    Hypercholesteremia    Tobacco abuse    Generalized anxiety disorder    Overdose of trazodone    Medical clearance for psychiatric admission      Discharge Diagnosis:     Principal Problem:    Bipolar I disorder, most recent episode depressed, severe without psychotic features ([facility] )  Active Problems:    Essential hypertension    Acquired hypothyroidism    Alcohol use disorder, moderate, dependence (HCC)    Post-traumatic stress disorder, chronic    Hypercholesteremia    Tobacco abuse    Generalized anxiety disorder    Overdose of trazodone    Medical clearance for psychiatric admission  Resolved Problems:    * No resolved hospital problems   *      Lab results:    Admission on 02/10/2020   Component Date Value    Sodium 02/10/2020 137     Potassium 02/10/2020 3 3*    Chloride 02/10/2020 98     CO2 02/10/2020 26     ANION GAP 02/10/2020 13     BUN 02/10/2020 11     Creatinine 02/10/2020 0 84     Glucose 02/10/2020 151*    Calcium 02/10/2020 9 6     AST 02/10/2020 77*    ALT 02/10/2020 93*    Alkaline Phosphatase 02/10/2020 116     Total Protein 02/10/2020 7 8     Albumin 02/10/2020 4 6     Total Bilirubin 02/10/2020 0 40     eGFR 02/10/2020 80     WBC 02/10/2020 11 10*    RBC 02/10/2020 4 41     Hemoglobin 02/10/2020 13 9     Hematocrit 02/10/2020 41 3     MCV 02/10/2020 94     MCH 02/10/2020 31 5     MCHC 02/10/2020 33 7     RDW 02/10/2020 14 7     MPV 02/10/2020 7 6*    Platelets 94/02/4640 290     Neutrophils Relative 02/10/2020 67*    Lymphocytes Relative 02/10/2020 24*    Monocytes Relative 02/10/2020 8     Eosinophils Relative 02/10/2020 0     Basophils Relative 02/10/2020 1     Neutrophils Absolute 02/10/2020 7 50     Lymphocytes Absolute 02/10/2020 2 60     Monocytes Absolute 02/10/2020 0 90     Eosinophils Absolute 02/10/2020 0 00     Basophils Absolute 02/10/2020 0 10     Protime 02/10/2020 9 6*    INR 02/10/2020 0 86*    PTT 02/10/2020 25     TSH 3RD GENERATON 02/10/2020 2 470     Ammonia 02/10/2020 <9*    Ethanol Lvl 02/10/2020 178*    Amph/Meth UR 02/10/2020 Negative     Barbiturate Ur 02/10/2020 Negative     Benzodiazepine Urine 02/10/2020 Negative     Cocaine Urine 02/10/2020 Negative     Methadone Urine 02/10/2020 Negative     Opiate Urine 02/10/2020 Negative     PCP Ur 02/10/2020 Negative     THC Urine 02/10/2020 Negative     Acetaminophen Level 62/65/1363 <70*    Salicylate Lvl 72/73/1255 <1 0*    EXT PREG TEST UR (Ref: N* 02/10/2020 negative     Control 02/10/2020 valid     Sodium 02/10/2020 138     Potassium 02/10/2020 4 9     Chloride 02/10/2020 101     CO2 02/10/2020 27     ANION GAP 02/10/2020 10     BUN 02/10/2020 11     Creatinine 02/10/2020 0 68     Glucose 02/10/2020 146*    Calcium 02/10/2020 9 0     eGFR 02/10/2020 101     Sodium 02/10/2020 140     Potassium 02/10/2020 4 0     Chloride 02/10/2020 106     CO2 02/10/2020 25     ANION GAP 02/10/2020 9     BUN 02/10/2020 11     Creatinine 02/10/2020 0 57*    Glucose 02/10/2020 132*    Calcium 02/10/2020 8 5     AST 02/10/2020 55*    ALT 02/10/2020 75*    Alkaline Phosphatase 02/10/2020 87     Total Protein 02/10/2020 6 8     Albumin 02/10/2020 3 8     Total Bilirubin 02/10/2020 0 30     eGFR 02/10/2020 107     Cholesterol 02/11/2020 168     Triglycerides 02/11/2020 168*    HDL, Direct 02/11/2020 58     LDL Calculated 02/11/2020 76     Non-HDL-Chol (CHOL-HDL) 02/11/2020 110     RPR 02/11/2020 Non-Reactive     Ventricular Rate 02/10/2020 94     Atrial Rate 02/10/2020 94     MN Interval 02/10/2020 240     QRSD Interval 02/10/2020 76     QT Interval 02/10/2020 370     QTC Interval 02/10/2020 462     P Axis 02/10/2020 43     QRS Axis 02/10/2020 6     T Wave Axis 02/10/2020 4     Ventricular Rate 02/11/2020 84     Atrial Rate 02/11/2020 84     MS Interval 02/11/2020 184     QRSD Interval 02/11/2020 68     QT Interval 02/11/2020 398     QTC Interval 02/11/2020 470     P Axis 02/11/2020 60     QRS Axis 02/11/2020 29     T Wave [place] 02/11/2020 21     TSH 3RD GENERATON 02/12/2020 2 540     Lithium Lvl 02/15/2020 0 3*    Ventricular Rate 02/15/2020 61     Atrial Rate 02/15/2020 61     MS Interval 02/15/2020 214     QRSD Interval 02/15/2020 66     QT Interval 02/15/2020 440     QTC Interval 02/15/2020 442     P Axis 02/15/2020 26     QRS Axis 02/15/2020 24     T Wave Axis 02/15/2020 2        Discharge Medications:    See after visit summary for reconciled discharge medications provided to patient and family  Paper scripts were given for 30 days and 1 RF  Patient was made aware the need to follow up with medical provider  Discharge instructions/Information to patient and family:     See after visit summary for information provided to patient and family  Provisions for Follow-Up Care:    See after visit summary for information related to follow-up care and any pertinent home health orders  Discharge Statement     I spent 25 minutes discharging the patient  This time was spent on the day of discharge  I had direct contact with the patient on the day of discharge         YONG Vallecillo

## 2020-02-16 NOTE — NURSING NOTE
Patient requested Ambien 5 mg po prn at 2107  Patient also requested Robitussin at that same time  She reports her dry cough is getting better  Patient visible and comes to staff to have her needs met  Medication compliant  Social with peers  Offered no complaints  No issues related to behaviors  Patient in bed at this time and appears to be resting comfortably  Ambien was effective

## 2020-02-16 NOTE — PROGRESS NOTES
Pt pleasant in conversation  Visible in milieu throughout day  Social mostly with roommate  No SI  Reports feeling a little down today  Denies any particular stressor  Pt spoke about feeling a little anxious to go home  Pt said that she often turns to etoh when she feels a panic attack starting  Pt said she will speak to provider in AM about prn medication she can use after discharge  Pt was also encouraged to try different coping skills and continue with outpatient therapy  Pt reports her last therapist was not helpful and is interested in finding a new therapist  Pt denies any other concerns

## 2020-02-16 NOTE — PROGRESS NOTES
Pt remains visible in milieu and social with peers this evening  Feels that mylanta is helping her cough and feels it was GI related  Pt said she needs to see her GI doctor soon  Denies any issues/concerns

## 2020-02-17 VITALS
DIASTOLIC BLOOD PRESSURE: 85 MMHG | HEART RATE: 92 BPM | RESPIRATION RATE: 16 BRPM | WEIGHT: 198 LBS | HEIGHT: 62 IN | OXYGEN SATURATION: 94 % | SYSTOLIC BLOOD PRESSURE: 126 MMHG | BODY MASS INDEX: 36.44 KG/M2 | TEMPERATURE: 97.8 F

## 2020-02-17 PROBLEM — Z00.8 MEDICAL CLEARANCE FOR PSYCHIATRIC ADMISSION: Status: RESOLVED | Noted: 2020-02-11 | Resolved: 2020-02-17

## 2020-02-17 LAB
FOLATE SERPL-MCNC: 11.3 NG/ML (ref 3.1–17.5)
VIT B12 SERPL-MCNC: 618 PG/ML (ref 100–900)

## 2020-02-17 PROCEDURE — 99238 HOSP IP/OBS DSCHRG MGMT 30/<: CPT | Performed by: NURSE PRACTITIONER

## 2020-02-17 RX ORDER — METOPROLOL TARTRATE 50 MG/1
50 TABLET, FILM COATED ORAL EVERY 12 HOURS
Qty: 60 TABLET | Refills: 0 | Status: SHIPPED | OUTPATIENT
Start: 2020-02-17 | End: 2020-03-05 | Stop reason: HOSPADM

## 2020-02-17 RX ORDER — LANOLIN ALCOHOL/MO/W.PET/CERES
100 CREAM (GRAM) TOPICAL DAILY
Qty: 30 TABLET | Refills: 0 | Status: ON HOLD | OUTPATIENT
Start: 2020-02-17 | End: 2020-03-19 | Stop reason: SDUPTHER

## 2020-02-17 RX ORDER — LITHIUM CARBONATE 300 MG/1
300 CAPSULE ORAL EVERY MORNING
Qty: 30 CAPSULE | Refills: 1 | Status: SHIPPED | OUTPATIENT
Start: 2020-02-17 | End: 2020-03-19 | Stop reason: HOSPADM

## 2020-02-17 RX ORDER — LITHIUM CARBONATE 150 MG/1
450 CAPSULE ORAL
Qty: 180 CAPSULE | Refills: 1 | Status: ON HOLD | OUTPATIENT
Start: 2020-02-17 | End: 2020-03-19 | Stop reason: SDUPTHER

## 2020-02-17 RX ORDER — HYDROXYZINE HYDROCHLORIDE 25 MG/1
25 TABLET, FILM COATED ORAL EVERY 6 HOURS PRN
Qty: 30 TABLET | Refills: 0 | Status: SHIPPED | OUTPATIENT
Start: 2020-02-17 | End: 2020-03-05 | Stop reason: HOSPADM

## 2020-02-17 RX ORDER — LAMOTRIGINE 25 MG/1
TABLET ORAL
Qty: 90 TABLET | Refills: 0 | OUTPATIENT
Start: 2020-02-17

## 2020-02-17 RX ORDER — SUCRALFATE 1 G/1
1 TABLET ORAL
Qty: 120 TABLET | Refills: 1 | Status: SHIPPED | OUTPATIENT
Start: 2020-02-17 | End: 2020-03-19 | Stop reason: HOSPADM

## 2020-02-17 RX ORDER — LOSARTAN POTASSIUM 100 MG/1
100 TABLET ORAL DAILY
Qty: 30 TABLET | Refills: 1 | Status: ON HOLD | OUTPATIENT
Start: 2020-02-17 | End: 2020-03-19 | Stop reason: SDUPTHER

## 2020-02-17 RX ORDER — FOLIC ACID 1 MG/1
1 TABLET ORAL DAILY
Qty: 30 TABLET | Refills: 0 | Status: SHIPPED | OUTPATIENT
Start: 2020-02-17 | End: 2020-03-19 | Stop reason: HOSPADM

## 2020-02-17 RX ORDER — CLONIDINE HYDROCHLORIDE 0.1 MG/1
0.1 TABLET ORAL 2 TIMES DAILY
Qty: 60 TABLET | Refills: 0 | Status: ON HOLD | OUTPATIENT
Start: 2020-02-17 | End: 2020-03-19 | Stop reason: SDUPTHER

## 2020-02-17 RX ORDER — DOXEPIN HYDROCHLORIDE 50 MG/1
50 CAPSULE ORAL
Qty: 30 CAPSULE | Refills: 0 | Status: SHIPPED | OUTPATIENT
Start: 2020-02-17 | End: 2020-03-05 | Stop reason: HOSPADM

## 2020-02-17 RX ORDER — GABAPENTIN 400 MG/1
400 CAPSULE ORAL 3 TIMES DAILY
Qty: 90 CAPSULE | Refills: 1 | Status: SHIPPED | OUTPATIENT
Start: 2020-02-17 | End: 2020-03-19 | Stop reason: HOSPADM

## 2020-02-17 RX ORDER — AMLODIPINE BESYLATE 5 MG/1
5 TABLET ORAL 2 TIMES DAILY
Qty: 60 TABLET | Refills: 1 | Status: ON HOLD | OUTPATIENT
Start: 2020-02-17 | End: 2020-03-05 | Stop reason: SDUPTHER

## 2020-02-17 RX ORDER — LORATADINE 10 MG/1
10 TABLET ORAL DAILY
Qty: 30 TABLET | Refills: 0 | Status: SHIPPED | OUTPATIENT
Start: 2020-02-17 | End: 2020-03-05 | Stop reason: HOSPADM

## 2020-02-17 RX ORDER — GABAPENTIN 300 MG/1
600 CAPSULE ORAL
Qty: 60 CAPSULE | Refills: 1 | Status: SHIPPED | OUTPATIENT
Start: 2020-02-17 | End: 2020-03-19 | Stop reason: HOSPADM

## 2020-02-17 RX ORDER — PANTOPRAZOLE SODIUM 40 MG/1
40 TABLET, DELAYED RELEASE ORAL
Qty: 30 TABLET | Refills: 0 | Status: ON HOLD | OUTPATIENT
Start: 2020-02-17 | End: 2020-03-19 | Stop reason: SDUPTHER

## 2020-02-17 RX ADMIN — LOSARTAN POTASSIUM 100 MG: 50 TABLET, FILM COATED ORAL at 08:25

## 2020-02-17 RX ADMIN — CLONIDINE HYDROCHLORIDE 0.1 MG: 0.1 TABLET ORAL at 08:25

## 2020-02-17 RX ADMIN — SUCRALFATE 1 G: 1 TABLET ORAL at 06:00

## 2020-02-17 RX ADMIN — FOLIC ACID 1 MG: 1 TABLET ORAL at 08:25

## 2020-02-17 RX ADMIN — LORATADINE 10 MG: 10 TABLET ORAL at 08:25

## 2020-02-17 RX ADMIN — GABAPENTIN 400 MG: 400 CAPSULE ORAL at 08:24

## 2020-02-17 RX ADMIN — ATORVASTATIN CALCIUM 40 MG: 40 TABLET, FILM COATED ORAL at 08:26

## 2020-02-17 RX ADMIN — Medication 1 TABLET: at 08:25

## 2020-02-17 RX ADMIN — THIAMINE HCL TAB 100 MG 100 MG: 100 TAB at 08:25

## 2020-02-17 RX ADMIN — GABAPENTIN 400 MG: 400 CAPSULE ORAL at 12:54

## 2020-02-17 RX ADMIN — LITHIUM CARBONATE 300 MG: 300 CAPSULE, GELATIN COATED ORAL at 08:25

## 2020-02-17 RX ADMIN — ALUMINUM HYDROXIDE, MAGNESIUM HYDROXIDE, AND SIMETHICONE 30 ML: 200; 200; 20 SUSPENSION ORAL at 11:19

## 2020-02-17 RX ADMIN — LEVOTHYROXINE SODIUM 50 MCG: 50 TABLET ORAL at 05:59

## 2020-02-17 RX ADMIN — AMLODIPINE BESYLATE 5 MG: 5 TABLET ORAL at 08:25

## 2020-02-17 RX ADMIN — PANTOPRAZOLE SODIUM 40 MG: 40 TABLET, DELAYED RELEASE ORAL at 06:00

## 2020-02-17 RX ADMIN — SUCRALFATE 1 G: 1 TABLET ORAL at 11:19

## 2020-02-17 RX ADMIN — METOPROLOL TARTRATE 50 MG: 50 TABLET, FILM COATED ORAL at 08:25

## 2020-02-17 RX ADMIN — NICOTINE 1 PATCH: 21 PATCH, EXTENDED RELEASE TRANSDERMAL at 08:27

## 2020-02-17 NOTE — TREATMENT TEAM
02/17/20 1100   Team Meeting   Meeting Type Daily Rounds   Initial Conference Date 02/17/20   Team Members Present   Team Members Present Physician;Nurse;   Physician Team Member Antolin khan  [address] Shearer   Nursing Team Member TOMMY Gilliland   Social Work Team Member Bk Floyd   Patient/Family Present   Patient Present No   Patient's Family Present No   Pt to be discharged today  Outpatient appts to be scheduled  Pt requested prn medication for breakthrough anxiety

## 2020-02-17 NOTE — BH TRANSITION RECORD
Contact Information: If you have any questions, concerns, pended studies, tests and/or procedures, or emergencies regarding your inpatient behavioral health visit  Please contact [address] [place] [address] behavioral health unit 3P [phone] and ask to speak to a , nurse or physician  A contact is available 24 hours/ 7 days a week at this number  Summary of Procedures Performed During your Stay:  Below is a list of major procedures performed during your hospital stay and a summary of results:  - Cardiac Procedures/Studies: EKG  Pending Studies (From admission, onward)    None        If studies are pending at discharge, follow up with your PCP and/or referring provider

## 2020-02-17 NOTE — DISCHARGE INSTR - OTHER ORDERS
If you are experiencing a mental health emergency, you may call the [id number] [place] [address] 24 hours a day, 7 days per week at [phone]  In [facility], call [phone]  HOW TO GET SUBSTANCE ABUSE HELP:  If you or someone you know has a drug or alcohol problem, there is help:  Lizeth 44: [address]: [phone]  An assessment is the first step  In addition to those listed there are other programs available in the area but assessment is best to determine an appropriate level of care  If you DO NOT have Medical Assistance (MA) or [employer], an assessment can be scheduled at one of these providers:  [facility] Kenisha Calcirelli 13, [address]  [phone]   101 [facility] [address] , [place], [address]  [facility] 75  [address] [place], [address]   Step by 8012 [facility] [address] , [place], 98 [facility]  [phone]   Treatment Trends  [place]  1320 [facility] , [place], 98 [facility]  2000 [facility],[address] , [address], [place], [address] [facility] [phone]     If you [address], an assessment can be scheduled at one of these providers:  [place] [facility] [place] on [facility] Abuse  [address] , [place], 98 [facility]  [address]  Kenisha Calcirelli 13, [address]  [address]  D&A Intake Unit  620 [facility] [address]st Floor, [place], [address]  [address], 300 [facility],6Th Floor, [place], [address] [place] [address] [facility] [address] , [place], [address]  [address]   [place] [id number] [facility] [place]) [place] [address] [facility], [address] 253 [facility] 721 [facility] [place], [address]   Step by 8012 [facility] [address] , [place], 98 [facility]  [phone]   Treatment Trends  [place]  1320 [facility] , [place], 98 [facility]  2000 [facility],[address] , [address], [place], [address] [place] [phone]     If you [address], an assessment can be scheduled at one of these providers  Please contact these Providers to determine if they are in your network plan:  [facility]  620 [facility] [address] , 1st Floor, [place], [address]  [facility] [address] , [place], [address]  [address], 122 [facility] SUGAR [facility]) [facility] 57 [facility], [address] 253 [facility] [address]  [address] , [address], [place], [address] [place] [phone]   The [facility] Family-to-Family Education Program is a free 12-week (2 1/2 hours/week) course for families of individuals with severe brain disorders (mental illnesses)  The classes are taught by trained family members  All course materials are furnished at no cost to you  Below are some details  To register, e-mail [email or web address] or call [phone]  The curriculum focuses on schizophrenia, bipolar disorder (manic depression), clinical depression, panic disorders and obsessive-compulsive disorder (OCD)  The course discusses the clinical treatment of these illnesses and teaches the knowledge and skills that family members need to cope more effectively    Topics Include:  Learning about feelings, learning about facts   Schizophrenia, major depression and doc: diagnosis and dealing with critical periods   Subtypes of depression and bipolar disorder, panic disorder and OCD; diagnosis and causes; sharing our stories   The biology of the brain/new research   Problem solving workshops   Medication review   Empathy workshop  what its like to have a brain disorder   Communication skills workshop   Self-care and relative groups   Rehabilitation, services available   Advocacy: fighting stigma   Review and certification ceremony    [facility] Education Course  The [place]enia Education class is a ten week  two hours per week  experiential education course on the topic of recovery for any person with serious mental illness who is interested in establishing and maintaining wellness  The course uses a combination of lecture, interactive exercises, and structural group processes  The diversity of experience among participants affords for a lively dynamic that moves the course along  [employer]s Jcak-pe-Bkxt Education class is offered free of charge to people who experience mental illness  You do not need to be a member of [facility] to take the course  Courses are taught by teams of trained mentors/peer-teachers who are themselves experienced at living well with mental illness  Below are some details  To register, call [phone] or e-mail [email or web address]  Sign up today! 225 [facility] group is for family members, caregivers, and loved ones of individuals living with the everyday challenges of mental illness  The leaders are family members in the same situation  Sessions take place in an intimate, confidential setting to allow families to share openly with each other  These support groups allow participants to learn from the experiences of other group members, share coping strategies, and offer each other encouragement and understanding  [facility] [place] know that you are not alone  Drop inno registration is necessary  Here are the times and locations    MARIANNE  Monthly: 3rd Monday, 7:00-8:30 pm  [address]ARI [facility]  [address]   Monthly: 4th Tuesday, 7:00-8:30 pm  [facility]  Monthly: 1st Monday, 7:00-8:30 pm  [facility]  3001 [facility], [address] [place] [address]         Monthly Support for Persons with Mental Illness  The Peer Support Group is a monthly meeting for individuals facing the challenges of recovering from severe and persistent mental illness  Depression, manic depression, schizophrenia, and general anxiety disorder are only a few of the diagnoses of individuals who have found a supportive place at our meetings  Our [place]  We are a fellowship of individuals who share a common goal of recovery and the ability to maintain mental and emotional stability  We help others and ourselves through sharing our experiences, strength and hope with each other  No matter how traumatic our past or how despairing our present may be, there is hope for a new day  Sessions take place in an intimate, confidential setting to allow individuals to share openly with each other  Corrinne Heckler know that you are not alone  Drop inno registration is necessary  Here are the times and locations  PATRIC  Monthly: 1st Monday, 7:00-8:30 pm  [facility]  [address], [place]   YQVTALLVT  Monthly: 3rd Monday, 7:00-8:30 pm  [address]       When you need someone to listen, the Vlad Kitty is available for 16 hours a day, 7 days a week, from the time of 7-10am and 2pm-2am   It is not available from the hours of 2am-6am and 10am-2pm  A representative can be reached at [phone]

## 2020-02-17 NOTE — PLAN OF CARE
Pt discharged today  Pt denied SI/HI, AH/VH  Pt oriented x3  Pt to return home and was picked up by family  Pt to follow up with LENORA  Pt sent with scripts  D/C Address: [address], [address]

## 2020-02-17 NOTE — PLAN OF CARE
Problem: Potential for Falls  Goal: Patient will remain free of falls  Description  INTERVENTIONS:  - Assess patient frequently for physical needs  -  Identify cognitive and physical deficits and behaviors that affect risk of falls    -  Reynolds fall precautions as indicated by assessment   - Educate patient/family on patient safety including physical limitations  - Instruct patient to call for assistance with activity based on assessment  - Modify environment to reduce risk of injury  - Consider OT/PT consult to assist with strengthening/mobility  Outcome: Completed     Problem: Risk for Self Injury/Neglect  Goal: Treatment Goal: Remain safe during length of stay, learn and adopt new coping skills, and be free of self-injurious ideation, impulses and acts at the time of discharge  Outcome: Completed  Goal: Verbalize thoughts and feelings  Description  Interventions:  - Assess and re-assess patient's lethality and potential for self-injury  - Engage patient in 1:1 interactions, daily, for a minimum of 15 minutes  - Encourage patient to express feelings, fears, frustrations, hopes  - Establish rapport/trust with patient   Outcome: Completed  Goal: Refrain from harming self  Description  Interventions:  - Monitor patient closely, per order  - Develop a trusting relationship  - Supervise medication ingestion, monitor effects and side effects   Outcome: Completed  Goal: Recognize maladaptive responses and adopt new coping mechanisms  Outcome: Completed  Goal: Complete daily ADLs, including personal hygiene independently, as able  Description  Interventions:  - Observe, teach, and assist patient with ADLS  - Monitor and promote a balance of rest/activity, with adequate nutrition and elimination  Outcome: Completed     Problem: Depression  Goal: Treatment Goal: Demonstrate behavioral control of depressive symptoms, verbalize feelings of improved mood/affect, and adopt new coping skills prior to discharge  Outcome: Completed  Goal: Verbalize thoughts and feelings  Description  Interventions:  - Assess and re-assess patient's level of risk   - Engage patient in 1:1 interactions, daily, for a minimum of 15 minutes   - Encourage patient to express feelings, fears, frustrations, hopes   Outcome: Completed  Goal: Refrain from isolation  Description  Interventions:  - Develop a trusting relationship   - Encourage socialization   Outcome: Completed  Goal: Refrain from self-neglect  Outcome: Completed     Problem: Anxiety  Goal: Anxiety is at manageable level  Description  Interventions:  - Assess and monitor patient's anxiety level  - Monitor for signs and symptoms (heart palpitations, chest pain, shortness of breath, headaches, nausea, feeling jumpy, restlessness, irritable, apprehensive)  - Collaborate with interdisciplinary team and initiate plan and interventions as ordered    - [place] patient to unit/surroundings  - Explain treatment plan  - Encourage participation in care  - Encourage verbalization of concerns/fears  - Identify coping mechanisms  - Assist in developing anxiety-reducing skills  - Administer/offer alternative therapies  - Limit or eliminate stimulants  Outcome: Completed     Problem: Ineffective Coping  Goal: Participates in unit activities  Description  Interventions:  - Provide therapeutic environment   - Provide required programming   - Redirect inappropriate behaviors   Outcome: Completed     Problem: DISCHARGE PLANNING  Goal: Discharge to home or other facility with appropriate resources  Description  INTERVENTIONS:  - Identify barriers to discharge w/patient and caregiver  - Arrange for needed discharge resources and transportation as appropriate  - Identify discharge learning needs (meds, wound care, etc )  - Arrange for interpretive services to assist at discharge as needed  - Refer to Case Management Department for coordinating discharge planning if the patient needs post-hospital services based on physician/advanced practitioner order or complex needs related to functional status, cognitive ability, or social support system  Outcome: Completed     Problem: SUBSTANCE USE/ABUSE  Goal: By discharge, will develop insight into their chemical dependency and sustain motivation to continue in recovery  Description  INTERVENTIONS:  - Attends all daily group sessions and scheduled AA groups  - Actively practices coping skills through participation in the therapeutic community and adherence to program rules  - Reviews and completes assignments from individual treatment plan  - Assist patient development of understanding of their personal cycle of addiction and relapse triggers  Outcome: Completed  Goal: By discharge, patient will have ongoing treatment plan addressing chemical dependency  Description  INTERVENTIONS:  - Assist patient with resources and/or appointments for ongoing recovery based living  Outcome: Completed

## 2020-02-17 NOTE — NURSING NOTE
Writer edcuated patient on discharge instructions which included medication dose schedule, follow-up with LENORA, suicide prevention hotline, smoking cessation quitline  Pt verbalized understanding of same  Pt given paper precscriptions for medications and [place] lab, belongings  Pt escorted to exit by MHT and received by father for transport home

## 2020-02-17 NOTE — SOCIAL WORK
Worker attempted to call LENORA numerous times with no answer  Worker informed pt that worker was unable to reach LENORA  Pt reports they are very difficult to communicate with  Worker obtained pt's cell phone number ([phone])  Worker explained she would try to reach them again tomorrow and notify pt via cell phone of follow up appointments  Pt in agreement

## 2020-02-17 NOTE — PROGRESS NOTES
Pt pleasant upon approach and cooeprative with care  Pt denies SI's, HI's, AH's, VH's at this time  Pt med meal compliant  Social with peers  Will continue to monitor and prepare for d/c today

## 2020-02-19 ENCOUNTER — HOSPITAL ENCOUNTER (EMERGENCY)
Facility: HOSPITAL | Age: 55
Discharge: HOME | End: 2020-02-19
Attending: EMERGENCY MEDICINE | Admitting: EMERGENCY MEDICINE
Payer: MEDICARE

## 2020-02-19 VITALS
TEMPERATURE: 98.1 F | RESPIRATION RATE: 20 BRPM | WEIGHT: 203 LBS | BODY MASS INDEX: 37.13 KG/M2 | HEART RATE: 96 BPM | OXYGEN SATURATION: 97 % | DIASTOLIC BLOOD PRESSURE: 78 MMHG | SYSTOLIC BLOOD PRESSURE: 133 MMHG

## 2020-02-19 DIAGNOSIS — F41.0 PANIC ATTACK: Primary | ICD-10-CM

## 2020-02-19 PROCEDURE — 99283 EMERGENCY DEPT VISIT LOW MDM: CPT

## 2020-02-19 PROCEDURE — 99284 EMERGENCY DEPT VISIT MOD MDM: CPT | Performed by: EMERGENCY MEDICINE

## 2020-02-19 RX ORDER — LORAZEPAM 0.5 MG/1
1 TABLET ORAL ONCE
Status: COMPLETED | OUTPATIENT
Start: 2020-02-19 | End: 2020-02-19

## 2020-02-19 RX ADMIN — LORAZEPAM 1 MG: 0.5 TABLET ORAL at 10:37

## 2020-02-19 NOTE — ED PROVIDER NOTES
History  Chief Complaint   Patient presents with    Panic Attack     claims feels anxious, shaky, sweaty, started this am   no ah/vh no si       Panic Attack   Presenting symptoms: agitation    Presenting symptoms: no suicidal thoughts    Degree of incapacity (severity):  Mild  Onset quality:  Gradual  Timing:  Constant  Progression:  Worsening  Chronicity:  New  Context: alcohol use, drug abuse and recent medication change    Treatment compliance:  Some of the time  Relieved by: Anti-anxiety medications  Worsened by:  Nothing  Ineffective treatments:  None tried  Associated symptoms: anxiety    Associated symptoms: no abdominal pain, no chest pain and no headaches        Prior to Admission Medications   Prescriptions Last Dose Informant Patient Reported? Taking?    amLODIPine (NORVASC) 5 mg tablet   No No   Sig: Take 1 tablet (5 mg total) by mouth 2 (two) times a day   cloNIDine (CATAPRES) 0 1 mg tablet   No No   Sig: Take 1 tablet (0 1 mg total) by mouth 2 (two) times a day   doxepin (SINEquan) 50 mg capsule   No No   Sig: Take 1 capsule (50 mg total) by mouth daily at bedtime   folic acid (FOLVITE) 1 mg tablet   No No   Sig: Take 1 tablet (1 mg total) by mouth daily   gabapentin (NEURONTIN) 300 mg capsule   No No   Sig: Take 2 capsules (600 mg total) by mouth daily at bedtime   gabapentin (NEURONTIN) 400 mg capsule   No No   Sig: Take 1 capsule (400 mg total) by mouth 3 (three) times a day   hydrOXYzine HCL (ATARAX) 25 mg tablet   No No   Sig: Take 1 tablet (25 mg total) by mouth every 6 (six) hours as needed for itching   lithium carbonate 150 mg capsule   No No   Sig: Take 3 capsules (450 mg total) by mouth daily at bedtime   lithium carbonate 300 mg capsule   No No   Sig: Take 1 capsule (300 mg total) by mouth every morning   loratadine (CLARITIN) 10 mg tablet   No No   Sig: Take 1 tablet (10 mg total) by mouth daily   losartan (COZAAR) 100 MG tablet   No No   Sig: Take 1 tablet (100 mg total) by mouth daily lurasidone (LATUDA) 80 mg tablet   No No   Sig: Take 1 tablet (80 mg total) by mouth daily with dinner   metoprolol tartrate (LOPRESSOR) 50 mg tablet   No No   Sig: Take 1 tablet (50 mg total) by mouth every 12 (twelve) hours   pantoprazole (PROTONIX) 40 mg tablet   No No   Sig: Take 1 tablet (40 mg total) by mouth daily in the early morning   sucralfate (CARAFATE) 1 g tablet   No No   Sig: Take 1 tablet (1 g total) by mouth 4 (four) times a day (before meals and at bedtime)   thiamine 100 MG tablet   No No   Sig: Take 1 tablet (100 mg total) by mouth daily      Facility-Administered Medications: None       Past Medical History:   Diagnosis Date    Alcohol abuse     [facility]     Anxiety     Bipolar disorder (HCC)     Bowel obstruction (HCC)     Depression     Gastritis     Head injury     Heart palpitations     Hyperlipidemia     Hypertension     Hypothyroidism     PTSD (post-traumatic stress disorder)     Seizure ([facility] )     Sleep difficulties     Suicide attempt [facility])        Past Surgical History:   Procedure Laterality Date    ABDOMINAL SURGERY      APPENDECTOMY      BOWEL RESECTION      CHOLECYSTECTOMY      ESOPHAGOGASTRODUODENOSCOPY N/A 5/18/2018    Procedure: ESOPHAGOGASTRODUODENOSCOPY (EGD) with bx;  Surgeon: Kilo Ayon DO;  Location: [facility]; Service: Gastroenterology    HYSTERECTOMY      KNEE SURGERY Bilateral        Family History   Problem Relation Age of Onset    Diabetes Father      I have reviewed and agree with the history as documented      Social History     Tobacco Use    Smoking status: Current Every Day Smoker     Packs/day: 0 50     Years: 25 00     Pack years: 12 50     Types: Cigarettes    Smokeless tobacco: Never Used   Substance Use Topics    Alcohol use: Not Currently     Alcohol/week: 21 0 standard drinks     Types: 21 Cans of beer per week     Frequency: 2-3 times a week     Drinks per session: 3 or 4     Binge frequency: Weekly     Comment: claims sober x2 months    Drug use: No       Review of Systems   Constitutional: Negative for chills and fever  HENT: Negative for rhinorrhea, sore throat and trouble swallowing  Eyes: Negative for pain  Respiratory: Negative for cough, shortness of breath, wheezing and stridor  Cardiovascular: Negative for chest pain and leg swelling  Gastrointestinal: Negative for abdominal pain, diarrhea and nausea  Endocrine: Negative for polyuria  Genitourinary: Negative for dysuria, flank pain and urgency  Musculoskeletal: Negative for joint swelling, myalgias and neck stiffness  Skin: Negative for rash  Allergic/Immunologic: Negative for immunocompromised state  Neurological: Negative for dizziness, syncope, weakness, numbness and headaches  Psychiatric/Behavioral: Positive for agitation  Negative for confusion and suicidal ideas  The patient is nervous/anxious  All other systems reviewed and are negative  Physical Exam  Physical Exam   Constitutional: She is oriented to person, place, and time  She appears well-developed and well-nourished  HENT:   Head: Normocephalic and atraumatic  Eyes: Pupils are equal, round, and reactive to light  EOM are normal    Neck: Normal range of motion  Neck supple  Cardiovascular: Normal rate and regular rhythm  Exam reveals no friction rub  No murmur heard  Pulmonary/Chest: Breath sounds normal  No respiratory distress  She has no wheezes  She has no rales  Abdominal: Soft  Bowel sounds are normal  She exhibits no distension  There is no tenderness  Musculoskeletal: Normal range of motion  She exhibits no edema or tenderness  Neurological: She is alert and oriented to person, place, and time  Skin: Skin is warm  No rash noted  Psychiatric: She has a normal mood and affect  Nursing note and vitals reviewed        Vital Signs  ED Triage Vitals [02/19/20 1004]   Temperature Pulse Respirations Blood Pressure SpO2   98 1 °F (36 7 °C) 96 20 133/78 97 %      Temp Source Heart Rate Source Patient Position - Orthostatic VS BP Location FiO2 (%)   Tympanic Monitor Sitting Left arm --      Pain Score       --           Vitals:    02/19/20 1004   BP: 133/78   Pulse: 96   Patient Position - Orthostatic VS: Sitting         Visual Acuity      ED Medications  Medications   LORazepam (ATIVAN) tablet 1 mg (has no administration in time range)       Diagnostic Studies  Results Reviewed     None                 No orders to display              Procedures  Procedures         ED Course                               MDM  Number of Diagnoses or Management Options  Panic attack: new and requires workup  Diagnosis management comments: This is a 50year 82-year-old female with a history of anxiety and recent suicide attempt who was recently admitted to hospital in discharge presents with emergency department with noted symptoms of anxiety woke up this morning complaining of anxiety symptoms  On out of her medications at this point time dose of medicine given in the emergency department the plan will be for outpatient management followup given strict instructions when to return back to the emergency department  Patient not suicidal not homicidal     Pt re-examined and evaluated after testing and treatment  Spoke with the patient and feeling improved and sxs have resolved  Will discharge home with close f/u with pcp and instructed to return to the ED if sxs worsen or continue  Pt agrees with the plan for discharge and feels comfortable to go home with proper f/u  Advised to return for worsening or additional problems  Diagnostic tests were reviewed and questions answered  Diagnosis, care plan and treatment options were discussed  The patient understand instructions and will follow up as directed      Counseling: I had a detailed discussion with the patient and/or guardian regarding: the historical points, exam findings, and any diagnostic results supporting the discharge diagnosis, lab results, radiology results, discharge instructions reviewed with patient and/or family/caregiver and understanding was verbalized  Instructions given to return to the emergency department if symptoms worsen or persist, or if there are any questions or concerns that arise at home  All labs reviewed and utilized in the medical decision making process    All radiology studies independently viewed by me and interpreted by the radiologist        Amount and/or Complexity of Data Reviewed  Tests in the medicine section of CPT®: ordered and reviewed  Review and summarize past medical records: yes          Disposition  Final diagnoses:   Panic attack     Time reflects when diagnosis was documented in both MDM as applicable and the Disposition within this note     Time User Action Codes Description Comment    2/19/2020 10:29 AM Ina Rios Add [F41 0] Panic attack       ED Disposition     ED Disposition Condition Date/Time Comment    Discharge Stable Wed Feb 19, 2020 10:29 AM Priscilla Garcia discharge to home/self care  Follow-up Information     Follow up With Specialties Details Why Contact Info Additional [address] Family Medicine Schedule an appointment as soon as possible for a visit  If symptoms worsen [address], [address] [place] [phone]  [address], [address], [address] [place], [place], [facility] [address]          Patient's Medications   Discharge Prescriptions    No medications on file     No discharge procedures on file      PDMP Review     None          ED Provider  Electronically Signed by           Soren Denis DO  02/19/20 5895

## 2020-03-02 ENCOUNTER — HOSPITAL ENCOUNTER (INPATIENT)
Facility: HOSPITAL | Age: 55
LOS: 3 days | DRG: 917 | End: 2020-03-05
Attending: EMERGENCY MEDICINE | Admitting: INTERNAL MEDICINE
Payer: MEDICARE

## 2020-03-02 DIAGNOSIS — Z72.0 TOBACCO ABUSE: ICD-10-CM

## 2020-03-02 DIAGNOSIS — T50.901A OVERDOSE: Primary | ICD-10-CM

## 2020-03-02 DIAGNOSIS — I10 ESSENTIAL HYPERTENSION: ICD-10-CM

## 2020-03-02 DIAGNOSIS — T50.902A INTENTIONAL DRUG OVERDOSE (HCC): ICD-10-CM

## 2020-03-02 DIAGNOSIS — I95.9 HYPOTENSION: ICD-10-CM

## 2020-03-02 PROBLEM — T50.912A DRUG OVERDOSE, MULTIPLE DRUGS, INTENTIONAL SELF-HARM, INITIAL ENCOUNTER (HCC): Status: ACTIVE | Noted: 2020-03-02

## 2020-03-02 LAB
ALBUMIN SERPL BCP-MCNC: 3.7 G/DL (ref 3.5–5)
ALP SERPL-CCNC: 100 U/L (ref 46–116)
ALT SERPL W P-5'-P-CCNC: 30 U/L (ref 12–78)
ANION GAP SERPL CALCULATED.3IONS-SCNC: 11 MMOL/L (ref 4–13)
APAP SERPL-MCNC: <2 UG/ML (ref 10–20)
AST SERPL W P-5'-P-CCNC: 16 U/L (ref 5–45)
ATRIAL RATE: 70 BPM
ATRIAL RATE: 70 BPM
ATRIAL RATE: 71 BPM
BASOPHILS # BLD AUTO: 0.04 THOUSANDS/ΜL (ref 0–0.1)
BASOPHILS NFR BLD AUTO: 0 % (ref 0–1)
BILIRUB SERPL-MCNC: 0.28 MG/DL (ref 0.2–1)
BUN SERPL-MCNC: 10 MG/DL (ref 5–25)
CALCIUM SERPL-MCNC: 9.5 MG/DL (ref 8.3–10.1)
CHLORIDE SERPL-SCNC: 101 MMOL/L (ref 100–108)
CO2 SERPL-SCNC: 25 MMOL/L (ref 21–32)
CREAT SERPL-MCNC: 1.14 MG/DL (ref 0.6–1.3)
EOSINOPHIL # BLD AUTO: 0.02 THOUSAND/ΜL (ref 0–0.61)
EOSINOPHIL NFR BLD AUTO: 0 % (ref 0–6)
ERYTHROCYTE [DISTWIDTH] IN BLOOD BY AUTOMATED COUNT: 12.7 % (ref 11.6–15.1)
ETHANOL SERPL-MCNC: <3 MG/DL (ref 0–3)
GFR SERPL CREATININE-BSD FRML MDRD: 55 ML/MIN/1.73SQ M
GLUCOSE SERPL-MCNC: 171 MG/DL (ref 65–140)
GLUCOSE SERPL-MCNC: 172 MG/DL (ref 65–140)
GLUCOSE SERPL-MCNC: 189 MG/DL (ref 65–140)
HCT VFR BLD AUTO: 40.8 % (ref 34.8–46.1)
HGB BLD-MCNC: 13.5 G/DL (ref 11.5–15.4)
IMM GRANULOCYTES # BLD AUTO: 0.06 THOUSAND/UL (ref 0–0.2)
IMM GRANULOCYTES NFR BLD AUTO: 1 % (ref 0–2)
LACTATE SERPL-SCNC: 2.3 MMOL/L (ref 0.5–2)
LIPASE SERPL-CCNC: 181 U/L (ref 73–393)
LITHIUM SERPL-SCNC: 0.3 MMOL/L (ref 0.5–1)
LYMPHOCYTES # BLD AUTO: 2.99 THOUSANDS/ΜL (ref 0.6–4.47)
LYMPHOCYTES NFR BLD AUTO: 25 % (ref 14–44)
MCH RBC QN AUTO: 32 PG (ref 26.8–34.3)
MCHC RBC AUTO-ENTMCNC: 33.1 G/DL (ref 31.4–37.4)
MCV RBC AUTO: 97 FL (ref 82–98)
MONOCYTES # BLD AUTO: 0.89 THOUSAND/ΜL (ref 0.17–1.22)
MONOCYTES NFR BLD AUTO: 7 % (ref 4–12)
NEUTROPHILS # BLD AUTO: 8.21 THOUSANDS/ΜL (ref 1.85–7.62)
NEUTS SEG NFR BLD AUTO: 67 % (ref 43–75)
NRBC BLD AUTO-RTO: 0 /100 WBCS
P AXIS: 63 DEGREES
P AXIS: 64 DEGREES
P AXIS: 76 DEGREES
PLATELET # BLD AUTO: 399 THOUSANDS/UL (ref 149–390)
PMV BLD AUTO: 9.5 FL (ref 8.9–12.7)
POTASSIUM SERPL-SCNC: 4.7 MMOL/L (ref 3.5–5.3)
PR INTERVAL: 198 MS
PR INTERVAL: 200 MS
PR INTERVAL: 200 MS
PROT SERPL-MCNC: 7.2 G/DL (ref 6.4–8.2)
QRS AXIS: 103 DEGREES
QRS AXIS: 52 DEGREES
QRS AXIS: 63 DEGREES
QRSD INTERVAL: 68 MS
QRSD INTERVAL: 70 MS
QRSD INTERVAL: 70 MS
QT INTERVAL: 382 MS
QT INTERVAL: 402 MS
QT INTERVAL: 408 MS
QTC INTERVAL: 412 MS
QTC INTERVAL: 436 MS
QTC INTERVAL: 440 MS
RBC # BLD AUTO: 4.22 MILLION/UL (ref 3.81–5.12)
SALICYLATES SERPL-MCNC: 5.3 MG/DL (ref 3–20)
SODIUM SERPL-SCNC: 137 MMOL/L (ref 136–145)
T WAVE AXIS: 25 DEGREES
T WAVE AXIS: 3 DEGREES
T WAVE AXIS: 50 DEGREES
TROPONIN I SERPL-MCNC: <0.02 NG/ML
TSH SERPL DL<=0.05 MIU/L-ACNC: 8.92 UIU/ML (ref 0.36–3.74)
VENTRICULAR RATE: 70 BPM
VENTRICULAR RATE: 70 BPM
VENTRICULAR RATE: 71 BPM
WBC # BLD AUTO: 12.21 THOUSAND/UL (ref 4.31–10.16)

## 2020-03-02 PROCEDURE — 80329 ANALGESICS NON-OPIOID 1 OR 2: CPT | Performed by: EMERGENCY MEDICINE

## 2020-03-02 PROCEDURE — 80053 COMPREHEN METABOLIC PANEL: CPT | Performed by: EMERGENCY MEDICINE

## 2020-03-02 PROCEDURE — 36415 COLL VENOUS BLD VENIPUNCTURE: CPT | Performed by: EMERGENCY MEDICINE

## 2020-03-02 PROCEDURE — 96375 TX/PRO/DX INJ NEW DRUG ADDON: CPT

## 2020-03-02 PROCEDURE — 85025 COMPLETE CBC W/AUTO DIFF WBC: CPT | Performed by: EMERGENCY MEDICINE

## 2020-03-02 PROCEDURE — 93005 ELECTROCARDIOGRAM TRACING: CPT

## 2020-03-02 PROCEDURE — 93010 ELECTROCARDIOGRAM REPORT: CPT | Performed by: INTERNAL MEDICINE

## 2020-03-02 PROCEDURE — 96365 THER/PROPH/DIAG IV INF INIT: CPT

## 2020-03-02 PROCEDURE — 99291 CRITICAL CARE FIRST HOUR: CPT | Performed by: EMERGENCY MEDICINE

## 2020-03-02 PROCEDURE — 82948 REAGENT STRIP/BLOOD GLUCOSE: CPT

## 2020-03-02 PROCEDURE — 80178 ASSAY OF LITHIUM: CPT | Performed by: EMERGENCY MEDICINE

## 2020-03-02 PROCEDURE — 80320 DRUG SCREEN QUANTALCOHOLS: CPT | Performed by: EMERGENCY MEDICINE

## 2020-03-02 PROCEDURE — 99223 1ST HOSP IP/OBS HIGH 75: CPT | Performed by: NURSE PRACTITIONER

## 2020-03-02 PROCEDURE — 83690 ASSAY OF LIPASE: CPT | Performed by: EMERGENCY MEDICINE

## 2020-03-02 PROCEDURE — 99449 NTRPROF PH1/NTRNET/EHR 31/>: CPT | Performed by: EMERGENCY MEDICINE

## 2020-03-02 PROCEDURE — 83605 ASSAY OF LACTIC ACID: CPT | Performed by: EMERGENCY MEDICINE

## 2020-03-02 PROCEDURE — 84443 ASSAY THYROID STIM HORMONE: CPT | Performed by: NURSE PRACTITIONER

## 2020-03-02 PROCEDURE — 84484 ASSAY OF TROPONIN QUANT: CPT | Performed by: EMERGENCY MEDICINE

## 2020-03-02 PROCEDURE — 99285 EMERGENCY DEPT VISIT HI MDM: CPT

## 2020-03-02 RX ORDER — FOLIC ACID 1 MG/1
1 TABLET ORAL DAILY
Status: DISCONTINUED | OUTPATIENT
Start: 2020-03-03 | End: 2020-03-05 | Stop reason: HOSPADM

## 2020-03-02 RX ORDER — DEXTROSE MONOHYDRATE 25 G/50ML
50 INJECTION, SOLUTION INTRAVENOUS ONCE
Status: DISCONTINUED | OUTPATIENT
Start: 2020-03-02 | End: 2020-03-02

## 2020-03-02 RX ORDER — THIAMINE MONONITRATE (VIT B1) 100 MG
100 TABLET ORAL DAILY
Status: DISCONTINUED | OUTPATIENT
Start: 2020-03-03 | End: 2020-03-05 | Stop reason: HOSPADM

## 2020-03-02 RX ORDER — SODIUM CHLORIDE, SODIUM LACTATE, POTASSIUM CHLORIDE, CALCIUM CHLORIDE 600; 310; 30; 20 MG/100ML; MG/100ML; MG/100ML; MG/100ML
100 INJECTION, SOLUTION INTRAVENOUS CONTINUOUS
Status: DISCONTINUED | OUTPATIENT
Start: 2020-03-02 | End: 2020-03-04

## 2020-03-02 RX ADMIN — SODIUM CHLORIDE, SODIUM LACTATE, POTASSIUM CHLORIDE, AND CALCIUM CHLORIDE 100 ML/HR: .6; .31; .03; .02 INJECTION, SOLUTION INTRAVENOUS at 18:16

## 2020-03-02 RX ADMIN — INSULIN LISPRO 1 UNITS: 100 INJECTION, SOLUTION INTRAVENOUS; SUBCUTANEOUS at 21:41

## 2020-03-02 RX ADMIN — SODIUM CHLORIDE, SODIUM LACTATE, POTASSIUM CHLORIDE, AND CALCIUM CHLORIDE 1000 ML: .6; .31; .03; .02 INJECTION, SOLUTION INTRAVENOUS at 20:00

## 2020-03-02 RX ADMIN — SODIUM BICARBONATE 100 MEQ: 84 INJECTION, SOLUTION INTRAVENOUS at 16:51

## 2020-03-02 RX ADMIN — GLUCAGON HYDROCHLORIDE 1 MG: KIT at 16:54

## 2020-03-02 RX ADMIN — SODIUM CHLORIDE 5 MCG/MIN: 0.9 INJECTION, SOLUTION INTRAVENOUS at 17:22

## 2020-03-02 NOTE — ED NOTES
Verbal order from Dr Yas Cutler to begin Bicarb administration at 100 ml/hr     Suman Fisher RN  03/02/20 9536

## 2020-03-02 NOTE — ASSESSMENT & PLAN NOTE
Patient reports she does not drink often, unable to quantify  Has history of alcohol abuse, will reevaluate when more awake  CIWA protocol

## 2020-03-02 NOTE — CONSULTS
PHONE [id number] Toxicology  Bita Leon 48 y o  female MRN: [id number]  Unit/Bed#: ED 20 Encounter: [id number]      Reason for Consult / Principal Problem: Overdose    Inpatient consult to Toxicology  Consult performed by: Shazia Graham MD  Consult ordered by: YONG Calvillo        03/02/20      ASSESSMENT:  1) Hypotension  2) Acute toxic encephalopathy  3) Prescription medication overdose    DISCUSSION/ RECOMMENDATIONS:  The patient presented after stated overdose on about 30 pills of her prescription medications, specifically blood pressure medication  It was unclear exactly what she overdosed on  The patient had significant hypotension which did not respond well to IV fluids  At time of initial consultation I recommended initiation of pressors with preference for either epinephrine or norepinephrine  I recommended against use of glucagon or high-dose insulin in this case  Overall I think that it is most likely the patient overdosed primarily on her amlodipine  This is due to the fact that she has had significant hypotension but without any bradycardia which I would expect to see with overdose on metoprolol or clonidine  Amlodipine is a dihydropyridine calcium channel blocker which causes hypotension primarily due to smooth muscle relaxation leading to vasodilation  In severe overdose bradycardia can also occasionally be seen due to overcoming of receptor selectivity, however in most cases patients present with a little bit of reflex tachycardia due to the vasodilation  Given that the patient has had a normal heart rate with no reflex tachycardia, it is also possible that she overdosed on her metoprolol and/or clonidine as well, with blunting of the reflex tachycardia from amlodipine  Regardless, I would recommend continued use of pressors as this is an effective treatment for any/ all of these ingestions    If this was in fact a polypharmacy overdose it is possible that some bradycardia will develop if the effects of amlodipine wear off prior to the effects of metoprolol or clonidine  Treatment continues to be with pressors  I suspect that the patient's encephalopathy is secondary both to her overdoses and to some degree of hypoperfusion  I have reviewed all of the patient's medications and lab results and do not have any specific recommendations for treatment of her encephalopathy aside from supportive care  Of note overall picture is not consistent with TCA overdose as QRS has been normal and there has not been a terminal R-wave in AVR  There is no further role for sodium bicarbonate at this time  However please keep the patient on continuous cardiac monitoring and if she does develop QRS widening, sodium bicarbonate should then be given  If QRS widening is refractory to sodium bicarbonate, lidocaine can then be tried  I would recommend initial dose of 100 mg IV with one additional dose being given if needed  Initial lithium concentration was below limits of detection  I would have expected some degree of absorption by now if there was a lithium overdose, and so no further lithium levels need to be sent  Patient can ultimately be cleared from medical toxicology perspective when vitals have normalized off of pressors, and when she is back to her baseline mental status with normal examination  I would expect continued toxicity for the next 12-24 hours  For further questions, please call [facility]'s  Service or Patient Access Center to reach the medical  on call  Hx and PE limited by the dynamics of a phone consultation  I have not personally interviewed or evaluated the patient, but only advised based on the information provided to me  Primary provider is responsible for all clinical decisions       Pertinent history, physical exam and clinical findings and course discussed: Matilde Daniels is a 48y o  year old female who presents with overdose  The patient stated that she overdosed on about 30 pills , particularly her blood pressure medications  However does not clear exactly which she ingested  She presented with normal heart rate but with profound hypotension requiring both IV fluids and pressors  She has had some CNS depression but is still responsive  Review of systems and physical exam not performed by me  Historical Information   Past Medical History:   Diagnosis Date    Alcohol abuse     Alcoholism ([facility] )     Anxiety     Bipolar disorder (HCC)     Bowel obstruction (HCC)     Depression     Gastritis     Head injury     Heart palpitations     Hyperlipidemia     Hypertension     Hypothyroidism     PTSD (post-traumatic stress disorder)     Seizure ([facility] )     Sleep difficulties     Suicide attempt [facility])      Past Surgical History:   Procedure Laterality Date    ABDOMINAL SURGERY      APPENDECTOMY      BOWEL RESECTION      CHOLECYSTECTOMY      ESOPHAGOGASTRODUODENOSCOPY N/A 5/18/2018    Procedure: ESOPHAGOGASTRODUODENOSCOPY (EGD) with bx;  Surgeon: Dimple Leon DO;  Location: [facility]; Service: Gastroenterology    HYSTERECTOMY      KNEE SURGERY Bilateral      Social History   Social History     Substance and Sexual Activity   Alcohol Use Not Currently    Alcohol/week: 21 0 standard drinks    Types: 21 Cans of beer per week    Frequency: 2-3 times a week    Drinks per session: 3 or 4    Binge frequency: Weekly    Comment: claims sober x2 months     Social History     Substance and Sexual Activity   Drug Use No     Social History     Tobacco Use   Smoking Status Current Every Day Smoker    Packs/day: 1 00    Years: 25 00    Pack years: 25 00    Types: Cigarettes   Smokeless Tobacco Never Used     Family History   Problem Relation Age of Onset    Diabetes Father         Prior to Admission medications    Medication Sig Start Date End Date Taking?  Authorizing Provider   amLODIPine (NORVASC) 5 mg tablet Take 1 tablet (5 mg total) by mouth 2 (two) times a day 2/17/20   YONG Monroe   cloNIDine (CATAPRES) 0 1 mg tablet Take 1 tablet (0 1 mg total) by mouth 2 (two) times a day 2/17/20   YONG Monroe   doxepin (SINEquan) 50 mg capsule Take 1 capsule (50 mg total) by mouth daily at bedtime 2/17/20   YONG Monroe   folic acid (FOLVITE) 1 mg tablet Take 1 tablet (1 mg total) by mouth daily 2/17/20   YONG Monreo   gabapentin (NEURONTIN) 300 mg capsule Take 2 capsules (600 mg total) by mouth daily at bedtime 2/17/20   YONG Monroe   gabapentin (NEURONTIN) 400 mg capsule Take 1 capsule (400 mg total) by mouth 3 (three) times a day 2/17/20   YONG Monroe   hydrOXYzine HCL (ATARAX) 25 mg tablet Take 1 tablet (25 mg total) by mouth every 6 (six) hours as needed for itching 2/17/20   YONG Monroe   lithium carbonate 150 mg capsule Take 3 capsules (450 mg total) by mouth daily at bedtime 2/17/20   YONG Monroe   lithium carbonate 300 mg capsule Take 1 capsule (300 mg total) by mouth every morning 2/17/20   YONG Monroe   loratadine (CLARITIN) 10 mg tablet Take 1 tablet (10 mg total) by mouth daily 2/17/20   YONG Monroe   losartan (COZAAR) 100 MG tablet Take 1 tablet (100 mg total) by mouth daily 2/17/20   YONG Monroe   lurasidone (LATUDA) 80 mg tablet Take 1 tablet (80 mg total) by mouth daily with dinner 2/17/20   YONG Monroe   metoprolol tartrate (LOPRESSOR) 50 mg tablet Take 1 tablet (50 mg total) by mouth every 12 (twelve) hours 2/17/20   YONG Monroe   pantoprazole (PROTONIX) 40 mg tablet Take 1 tablet (40 mg total) by mouth daily in the early morning 2/17/20   YONG Monroe   sucralfate (CARAFATE) 1 g tablet Take 1 tablet (1 g total) by mouth 4 (four) times a day (before meals and at bedtime) 2/17/20   YONG Monroe   thiamine 100 MG tablet Take 1 tablet (100 mg total) by mouth daily 2/17/20   YONG Spicer       Current Facility-Administered Medications   Medication Dose Route Frequency    dextrose 50 % IV solution 50 mL  50 mL Intravenous Once    insulin regular (HumuLIN R,NovoLIN R) injection 10 Units  10 Units Intravenous Once    norepinephrine (LEVOPHED) 4 mg (STANDARD CONCENTRATION) IV in sodium chloride 0 9% 250 mL  1-30 mcg/min Intravenous Titrated    sodium bicarbonate 150 mEq in dextrose 5 % 1,000 mL infusion  125 mL/hr Intravenous Continuous       Allergies   Allergen Reactions    Latex Rash       Objective     No intake or output data in the 24 hours ending 03/02/20 1711    Invasive Devices:        Vitals   Vitals:    03/02/20 1645 03/02/20 1653 03/02/20 1657 03/02/20 1708   BP: (!) 76/48 (!) 62/35 (!) 74/48 (!) 85/52   Pulse: 72 73  75   Resp: 16 12  14   Patient Position - Orthostatic VS:  Lying  Lying         EKG, Pathology, and/or Other Studies: I have personally reviewed pertinent reports  Lab Results: I have personally reviewed pertinent reports  Labs: Invalid input(s):  EOSPCT       Invalid input(s): LABALBU           0   Lab Value Date/Time    TROPONINI 0 02 12/22/2019 1812    TROPONINI 0 02 05/07/2019 1803    TROPONINI 0 02 05/07/2019 1447    TROPONINI 0 02 05/07/2019 1205    TROPONINI 0 02 05/07/2019 0901    TROPONINI <0 02 05/07/2019 0540    TROPONINI <0 01 08/09/2018 1825    TROPONINI <0 02 05/16/2018 1258             Invalid input(s): EXTPREGUR    Imaging Studies: I have personally reviewed pertinent reports  Counseling / Coordination of Care  Total time spent today 33 minutes  This was a phone consultation

## 2020-03-02 NOTE — ASSESSMENT & PLAN NOTE
Patient on multiple meds, all on hold until determined which she overdosed on  Will monitor closely for symptoms

## 2020-03-02 NOTE — ED NOTES
Per Dr Rupal Lion d/c sodium bicarb and begin LR infusion at this time        Anjali Craig RN  03/02/20 [id number]

## 2020-03-02 NOTE — ED PROVIDER NOTES
History  Chief Complaint   Patient presents with    Overdose - Intentional     Patient reports taking over "30 pills" to try to harm herself  Patient reports taking blood pressure medications  Reports history of SI attempts  Patientpresents lethargic and slow to answer questions  Reports taking other medications as welll but is unsure of what else  49 yo female brought by ambulance from home which she called and alleged she intentionally overdose on medications, arrived in the ED hypotensive, drowsy but responsive  She says she took with intent to kill herself about 1 hour PTA, but called ambulance when she "felt wobbly" and uncomfortable  She alleges she took handful of 3 medications, "my BP pill, sleeping pill, and the one that helps the other BP medication "  She is confident one of them was metoprolol, but she is not able to name specifically the others  Reviewing her meds, the potential concerning medications, include doxepin, clonidine, lithium, gabapentin, amlodipine  Based on this initial assessment of medication, I reviewed EKG and immediately gave her sodium bicarbonate 2 amps, glucagon 1mg  I involved the resident at this point in the case, whose note will documented additional conversations with toxicology, and course documentation which I am also directly supervising  Prior to Admission Medications   Prescriptions Last Dose Informant Patient Reported? Taking?    amLODIPine (NORVASC) 5 mg tablet   No No   Sig: Take 1 tablet (5 mg total) by mouth 2 (two) times a day   cloNIDine (CATAPRES) 0 1 mg tablet   No No   Sig: Take 1 tablet (0 1 mg total) by mouth 2 (two) times a day   doxepin (SINEquan) 50 mg capsule   No No   Sig: Take 1 capsule (50 mg total) by mouth daily at bedtime   folic acid (FOLVITE) 1 mg tablet   No No   Sig: Take 1 tablet (1 mg total) by mouth daily   gabapentin (NEURONTIN) 300 mg capsule   No No   Sig: Take 2 capsules (600 mg total) by mouth daily at bedtime gabapentin (NEURONTIN) 400 mg capsule   No No   Sig: Take 1 capsule (400 mg total) by mouth 3 (three) times a day   hydrOXYzine HCL (ATARAX) 25 mg tablet   No No   Sig: Take 1 tablet (25 mg total) by mouth every 6 (six) hours as needed for itching   lithium carbonate 150 mg capsule   No No   Sig: Take 3 capsules (450 mg total) by mouth daily at bedtime   lithium carbonate 300 mg capsule   No No   Sig: Take 1 capsule (300 mg total) by mouth every morning   loratadine (CLARITIN) 10 mg tablet   No No   Sig: Take 1 tablet (10 mg total) by mouth daily   losartan (COZAAR) 100 MG tablet   No No   Sig: Take 1 tablet (100 mg total) by mouth daily   lurasidone (LATUDA) 80 mg tablet   No No   Sig: Take 1 tablet (80 mg total) by mouth daily with dinner   metoprolol tartrate (LOPRESSOR) 50 mg tablet   No No   Sig: Take 1 tablet (50 mg total) by mouth every 12 (twelve) hours   pantoprazole (PROTONIX) 40 mg tablet   No No   Sig: Take 1 tablet (40 mg total) by mouth daily in the early morning   sucralfate (CARAFATE) 1 g tablet   No No   Sig: Take 1 tablet (1 g total) by mouth 4 (four) times a day (before meals and at bedtime)   thiamine 100 MG tablet   No No   Sig: Take 1 tablet (100 mg total) by mouth daily      Facility-Administered Medications: None       Past Medical History:   Diagnosis Date    Alcohol abuse     Alcoholism ([facility] )     Anxiety     Bipolar disorder (HCC)     Bowel obstruction (HCC)     Depression     Gastritis     Head injury     Heart palpitations     Hyperlipidemia     Hypertension     Hypothyroidism     PTSD (post-traumatic stress disorder)     Seizure ([facility] )     Sleep difficulties     Suicide attempt [facility])        Past Surgical History:   Procedure Laterality Date    ABDOMINAL SURGERY      APPENDECTOMY      BOWEL RESECTION      CHOLECYSTECTOMY      ESOPHAGOGASTRODUODENOSCOPY N/A 5/18/2018    Procedure: ESOPHAGOGASTRODUODENOSCOPY (EGD) with bx;  Surgeon: Wilton Hernandez DO;  Location: [facility]; Service: Gastroenterology    HYSTERECTOMY      KNEE SURGERY Bilateral        Family History   Problem Relation Age of Onset    Diabetes Father      I have reviewed and agree with the history as documented  E-Cigarette/Vaping    E-Cigarette Use Never User      E-Cigarette/Vaping Substances     Social History     Tobacco Use    Smoking status: Current Every Day Smoker     Packs/day: 1 00     Years: 25 00     Pack years: 25 00     Types: Cigarettes    Smokeless tobacco: Never Used   Substance Use Topics    Alcohol use: Yes     Alcohol/week: 21 0 standard drinks     Types: 21 Cans of beer per week     Frequency: Patient refused     Drinks per session: Patient refused     Binge frequency: Patient refused     Comment: claims sober x2 months    Drug use: No       Review of Systems   Unable to perform ROS: Acuity of condition       Physical Exam  Physical Exam   Constitutional: She appears well-developed and well-nourished  She appears listless  Hypotensive, normal HR   HENT:   Head: Normocephalic and atraumatic  Right Ear: External ear normal    Left Ear: External ear normal    Eyes: Pupils are equal, round, and reactive to light  Conjunctivae are normal    Cardiovascular: Normal rate and regular rhythm  Pulmonary/Chest: Effort normal  No tachypnea  No respiratory distress  Abdominal: Soft  She exhibits no distension  Neurological: She appears listless  GCS eye subscore is 3  GCS verbal subscore is 4  GCS motor subscore is 6  Skin: Capillary refill takes less than 2 seconds  She is diaphoretic  There is pallor  Psychiatric: Her speech is slurred  She is slowed  Nursing note and vitals reviewed        Vital Signs  ED Triage Vitals   Temperature Pulse Respirations Blood Pressure SpO2   03/02/20 1820 03/02/20 1644 03/02/20 1644 03/02/20 1644 03/02/20 1644   98 1 °F (36 7 °C) 70 12 (!) 80/51 90 %      Temp Source Heart Rate Source Patient Position - Orthostatic VS BP Location FiO2 (%) 03/02/20 1820 03/02/20 1644 03/02/20 1644 03/02/20 1644 --   Temporal Monitor Lying Left arm       Pain Score       03/02/20 1644       No Pain           Vitals:    03/03/20 1140 03/03/20 1147 03/03/20 1243 03/03/20 1300   BP: 128/78  119/79    Pulse:  72  76   Patient Position - Orthostatic VS:             Visual Acuity  Visual Acuity      Most Recent Value   L Pupil Size (mm)  3   R Pupil Size (mm)  3   L Pupil Shape  Round   R Pupil Shape  Round          ED Medications  Medications   lactated ringers infusion (100 mL/hr Intravenous New Bag 3/3/20 0734)   norepinephrine (LEVOPHED) 4 mg (STANDARD CONCENTRATION) IV in sodium chloride 0 9% 250 mL (0 mcg/min Intravenous Stopped 3/3/20 0915)   enoxaparin (LOVENOX) subcutaneous injection 40 mg (40 mg Subcutaneous Given 3/3/20 0954)   insulin lispro (HumaLOG) 100 units/mL subcutaneous injection 1-5 Units (1 Units Subcutaneous Not Given 3/3/20 1146)   thiamine (VITAMIN B1) tablet 100 mg (100 mg Oral Given 4/9/18 2463)   folic acid (FOLVITE) tablet 1 mg (1 mg Oral Given 3/3/20 0954)   multivitamin-minerals (CENTRUM) tablet 1 tablet (1 tablet Oral Given 3/3/20 0954)   sodium bicarbonate 8 4 % injection **ADS Override Pull** (100 mEq  Given 3/2/20 1651)   glucagon (GLUCAGEN) injection 1 mg (1 mg Intravenous Given 3/2/20 1654)   lactated ringers bolus 1,000 mL (1,000 mL Intravenous New Bag 3/2/20 2000)   calcium gluconate 2 g in sodium chloride 0 9% 100 mL (premix) (2 g Intravenous New Bag 3/3/20 0954)       Diagnostic Studies  Results Reviewed     Procedure Component Value Units Date/Time    Lactic acid, plasma x2 [[id number]]  (Normal) Collected:  03/03/20 0435    Lab Status:  Final result Specimen:  Blood from Arm, Right Updated:  03/03/20 0506     LACTIC ACID 1 4 mmol/L     Narrative:       Result may be elevated if tourniquet was used during collection      Rapid drug screen, urine [[id number]]  (Normal) Collected:  03/03/20 0222    Lab Status:  Final result Specimen: Urine, Clean Catch Updated:  03/03/20 0310     Amph/Meth UR Negative     Barbiturate Ur Negative     Benzodiazepine Urine Negative     Cocaine Urine Negative     Methadone Urine Negative     Opiate Urine Negative     PCP Ur Negative     THC Urine Negative    Narrative:       FOR MEDICAL PURPOSES ONLY  IF CONFIRMATION NEEDED PLEASE CONTACT THE LAB WITHIN 5 DAYS  Drug Screen Cutoff Levels:  AMPHETAMINE/METHAMPHETAMINES  1000 ng/mL  BARBITURATES     200 ng/mL  BENZODIAZEPINES     200 ng/mL  COCAINE      300 ng/mL  METHADONE      300 ng/mL  OPIATES      300 ng/mL  PHENCYCLIDINE     25 ng/mL  THC       50 ng/mL      TSH, 3rd generation [[id number]]  (Abnormal) Collected:  03/02/20 1704    Lab Status:  Final result Specimen:  Blood from Arm, Left Updated:  03/02/20 2038     TSH 3RD GENERATON 8 919 uIU/mL     Narrative:       Patients undergoing fluorescein dye angiography may retain small amounts of fluorescein in the body for 48-72 hours post procedure  Samples containing fluorescein can produce falsely depressed TSH values  If the patient had this procedure,a specimen should be resubmitted post fluorescein clearance  Lactic acid, plasma x2 [[id number]]  (Abnormal) Collected:  03/02/20 1652    Lab Status:  Final result Specimen:  Blood from Arm, Left Updated:  03/02/20 1759     LACTIC ACID 2 3 mmol/L     Narrative:       Result may be elevated if tourniquet was used during collection      Lithium level [[id number]]  (Abnormal) Collected:  03/02/20 1706    Lab Status:  Final result Specimen:  Blood from Arm, Left Updated:  03/02/20 1747     Lithium Lvl 0 3 mmol/L     Ethanol [[id number]]  (Normal) Collected:  03/02/20 1704    Lab Status:  Final result Specimen:  Blood from Arm, Left Updated:  03/02/20 1743     Ethanol Lvl <3 mg/dL     Lipase [[id number]]  (Normal) Collected:  03/02/20 1704    Lab Status:  Final result Specimen:  Blood from Arm, Left Updated:  03/02/20 1743     Lipase 180 u/L     Salicylate level [[id number]]  (Normal) Collected:  03/02/20 1704    Lab Status:  Final result Specimen:  Blood from Arm, Left Updated:  41/70/36 1541     Salicylate Lvl 5 3 mg/dL     Acetaminophen level-If concentration is detectable, please discuss with medical  on call   [[id number]]  (Abnormal) Collected:  03/02/20 1704    Lab Status:  Final result Specimen:  Blood from Arm, Left Updated:  03/02/20 1743     Acetaminophen Level <2 ug/mL     Comprehensive metabolic panel [[id number]]  (Abnormal) Collected:  03/02/20 1704    Lab Status:  Final result Specimen:  Blood from Arm, Left Updated:  03/02/20 1735     Sodium 137 mmol/L      Potassium 4 7 mmol/L      Chloride 101 mmol/L      CO2 25 mmol/L      ANION GAP 11 mmol/L      BUN 10 mg/dL      Creatinine 1 14 mg/dL      Glucose 189 mg/dL      Calcium 9 5 mg/dL      AST 16 U/L      ALT 30 U/L      Alkaline Phosphatase 100 U/L      Total Protein 7 2 g/dL      Albumin 3 7 g/dL      Total Bilirubin 0 28 mg/dL      eGFR 55 ml/min/1 73sq m     Narrative:       [place] guidelines for Chronic Kidney Disease (CKD):     Stage 1 with normal or high GFR (GFR > 90 mL/min/1 73 square meters)    Stage 2 Mild CKD (GFR = 60-89 mL/min/1 73 square meters)    Stage 3A Moderate CKD (GFR = 45-59 mL/min/1 73 square meters)    Stage 3B Moderate CKD (GFR = 30-44 mL/min/1 73 square meters)    Stage 4 Severe CKD (GFR = 15-29 mL/min/1 73 square meters)    Stage 5 End Stage CKD (GFR <15 mL/min/1 73 square meters)  Note: GFR calculation is accurate only with a steady state creatinine    Troponin I [[id number]]  (Normal) Collected:  03/02/20 1704    Lab Status:  Final result Specimen:  Blood from Arm, Left Updated:  03/02/20 1730     Troponin I <0 02 ng/mL     CBC and differential [[id number]]  (Abnormal) Collected:  03/02/20 1704    Lab Status:  Final result Specimen:  Blood from Arm, Left Updated:  03/02/20 1713     WBC 12 21 Thousand/uL      RBC 4 22 Million/uL Hemoglobin 13 5 g/dL      Hematocrit 40 8 %      MCV 97 fL      MCH 32 0 pg      MCHC 33 1 g/dL      RDW 12 7 %      MPV 9 5 fL      Platelets 564 Thousands/uL      nRBC 0 /100 WBCs      Neutrophils Relative 67 %      Immat GRANS % 1 %      Lymphocytes Relative 25 %      Monocytes Relative 7 %      Eosinophils Relative 0 %      Basophils Relative 0 %      Neutrophils Absolute 8 21 Thousands/µL      Immature Grans Absolute 0 06 Thousand/uL      Lymphocytes Absolute 2 99 Thousands/µL      Monocytes Absolute 0 89 Thousand/µL      Eosinophils Absolute 0 02 Thousand/µL      Basophils Absolute 0 04 Thousands/µL     POCT urinalysis dipstick [[id number]]     Lab Status:  No result Specimen:  Urine     Fingerstick Glucose (POCT) [[id number]]  (Abnormal) Collected:  03/02/20 1649    Lab Status:  Final result Updated:  03/02/20 1650     POC Glucose 171 mg/dl                  No orders to display              Procedures  CriticalCare Time  Performed by: Aminta Bravo MD  Authorized by: Aminta Bravo MD     Critical care provider statement:     Critical care time (minutes):  30    Critical care time was exclusive of:  Separately billable procedures and treating other patients and teaching time    Critical care was necessary to treat or prevent imminent or life-threatening deterioration of the following conditions:  Toxidrome and shock    Critical care was time spent personally by me on the following activities:  Blood draw for specimens, obtaining history from patient or surrogate, development of treatment plan with patient or surrogate, discussions with consultants, evaluation of patient's response to treatment, examination of patient, interpretation of cardiac output measurements, ordering and performing treatments and interventions, ordering and review of laboratory studies, ordering and review of radiographic studies, re-evaluation of patient's condition and review of old charts    I assumed direction of critical care for this patient from another provider in my specialty: no               ED Course                               MDM      Disposition  Final diagnoses:   Overdose   Intentional drug overdose ([facility] )   Hypotension     Time reflects when diagnosis was documented in both MDM as applicable and the Disposition within this note     Time User Action Codes Description Comment    3/2/2020  5:12 PM Sarah Laurent Add Kiron Scriver Overdose     3/2/2020  6:44 PM Rosemary, [address] Intentional drug overdose ([facility] )     3/2/2020  6:44 PM Sarah Laurent Add [I95 9] Hypotension       ED Disposition     ED Disposition Condition Date/Time Comment    Admit Stable Mon Mar 2, 2020  6:44 PM Case was discussed with Critical Care and the patient's admission status was agreed to be Admission Status: inpatient status to the service of Dr Felicity Pastor  Follow-up Information    None         Current Discharge Medication List      CONTINUE these medications which have NOT CHANGED    Details   amLODIPine (NORVASC) 5 mg tablet Take 1 tablet (5 mg total) by mouth 2 (two) times a day  Qty: 60 tablet, Refills: 1    Associated Diagnoses: Essential hypertension      cloNIDine (CATAPRES) 0 1 mg tablet Take 1 tablet (0 1 mg total) by mouth 2 (two) times a day  Qty: 60 tablet, Refills: 0    Associated Diagnoses: Essential hypertension      doxepin (SINEquan) 50 mg capsule Take 1 capsule (50 mg total) by mouth daily at bedtime  Qty: 30 capsule, Refills: 0    Associated Diagnoses: Bipolar I disorder, most recent episode depressed, severe without psychotic features (HCC)      folic acid (FOLVITE) 1 mg tablet Take 1 tablet (1 mg total) by mouth daily  Qty: 30 tablet, Refills: 0    Associated Diagnoses: Alcohol use disorder, severe, dependence ([facility] )      ! ! gabapentin (NEURONTIN) 300 mg capsule Take 2 capsules (600 mg total) by mouth daily at bedtime  Qty: 60 capsule, Refills: 1    Associated Diagnoses: Bipolar I disorder, most recent episode depressed, severe without psychotic features ([facility] )      ! ! gabapentin (NEURONTIN) 400 mg capsule Take 1 capsule (400 mg total) by mouth 3 (three) times a day  Qty: 90 capsule, Refills: 1    Associated Diagnoses: Generalized anxiety disorder      hydrOXYzine HCL (ATARAX) 25 mg tablet Take 1 tablet (25 mg total) by mouth every 6 (six) hours as needed for itching  Qty: 30 tablet, Refills: 0    Associated Diagnoses: Anxiety      !! lithium carbonate 150 mg capsule Take 3 capsules (450 mg total) by mouth daily at bedtime  Qty: 180 capsule, Refills: 1    Associated Diagnoses: Bipolar I disorder, most recent episode depressed, severe without psychotic features ([facility] )      ! ! lithium carbonate 300 mg capsule Take 1 capsule (300 mg total) by mouth every morning  Qty: 30 capsule, Refills: 1    Associated Diagnoses: Bipolar I disorder, most recent episode depressed, severe without psychotic features (HCC)      loratadine (CLARITIN) 10 mg tablet Take 1 tablet (10 mg total) by mouth daily  Qty: 30 tablet, Refills: 0    Associated Diagnoses:  Allergy      losartan (COZAAR) 100 MG tablet Take 1 tablet (100 mg total) by mouth daily  Qty: 30 tablet, Refills: 1    Associated Diagnoses: Essential hypertension      lurasidone (LATUDA) 80 mg tablet Take 1 tablet (80 mg total) by mouth daily with dinner  Qty: 30 tablet, Refills: 1    Associated Diagnoses: Bipolar affective disorder, depressed, severe (HCC)      metoprolol tartrate (LOPRESSOR) 50 mg tablet Take 1 tablet (50 mg total) by mouth every 12 (twelve) hours  Qty: 60 tablet, Refills: 0    Associated Diagnoses: Essential hypertension      pantoprazole (PROTONIX) 40 mg tablet Take 1 tablet (40 mg total) by mouth daily in the early morning  Qty: 30 tablet, Refills: 0    Associated Diagnoses: GERD (gastroesophageal reflux disease)      sucralfate (CARAFATE) 1 g tablet Take 1 tablet (1 g total) by mouth 4 (four) times a day (before meals and at bedtime)  Qty: 120 tablet, Refills: 1    Associated Diagnoses: Gastritis without bleeding, unspecified chronicity, unspecified gastritis type      thiamine 100 MG tablet Take 1 tablet (100 mg total) by mouth daily  Qty: 30 tablet, Refills: 0    Associated Diagnoses: Alcohol use disorder, moderate, dependence ([facility] )       ! ! - Potential duplicate medications found  Please discuss with provider  No discharge procedures on file      PDMP Review     None          ED Provider  Electronically Signed by           Constantino De La Cruz MD  03/03/20 7743

## 2020-03-03 LAB
AMPHETAMINES SERPL QL SCN: NEGATIVE
ANION GAP SERPL CALCULATED.3IONS-SCNC: 7 MMOL/L (ref 4–13)
ATRIAL RATE: 71 BPM
ATRIAL RATE: 74 BPM
ATRIAL RATE: 75 BPM
BARBITURATES UR QL: NEGATIVE
BASOPHILS # BLD AUTO: 0.03 THOUSANDS/ΜL (ref 0–0.1)
BASOPHILS NFR BLD AUTO: 0 % (ref 0–1)
BENZODIAZ UR QL: NEGATIVE
BUN SERPL-MCNC: 8 MG/DL (ref 5–25)
CALCIUM SERPL-MCNC: 8.5 MG/DL (ref 8.3–10.1)
CHLORIDE SERPL-SCNC: 107 MMOL/L (ref 100–108)
CO2 SERPL-SCNC: 29 MMOL/L (ref 21–32)
COCAINE UR QL: NEGATIVE
CREAT SERPL-MCNC: 0.89 MG/DL (ref 0.6–1.3)
EOSINOPHIL # BLD AUTO: 0.02 THOUSAND/ΜL (ref 0–0.61)
EOSINOPHIL NFR BLD AUTO: 0 % (ref 0–6)
ERYTHROCYTE [DISTWIDTH] IN BLOOD BY AUTOMATED COUNT: 12.8 % (ref 11.6–15.1)
GFR SERPL CREATININE-BSD FRML MDRD: 75 ML/MIN/1.73SQ M
GLUCOSE SERPL-MCNC: 124 MG/DL (ref 65–140)
GLUCOSE SERPL-MCNC: 156 MG/DL (ref 65–140)
GLUCOSE SERPL-MCNC: 159 MG/DL (ref 65–140)
GLUCOSE SERPL-MCNC: 81 MG/DL (ref 65–140)
HCT VFR BLD AUTO: 37.6 % (ref 34.8–46.1)
HGB BLD-MCNC: 12.2 G/DL (ref 11.5–15.4)
IMM GRANULOCYTES # BLD AUTO: 0.07 THOUSAND/UL (ref 0–0.2)
IMM GRANULOCYTES NFR BLD AUTO: 1 % (ref 0–2)
LACTATE SERPL-SCNC: 1.4 MMOL/L (ref 0.5–2)
LYMPHOCYTES # BLD AUTO: 2.61 THOUSANDS/ΜL (ref 0.6–4.47)
LYMPHOCYTES NFR BLD AUTO: 23 % (ref 14–44)
MCH RBC QN AUTO: 31.9 PG (ref 26.8–34.3)
MCHC RBC AUTO-ENTMCNC: 32.4 G/DL (ref 31.4–37.4)
MCV RBC AUTO: 98 FL (ref 82–98)
METHADONE UR QL: NEGATIVE
MONOCYTES # BLD AUTO: 0.87 THOUSAND/ΜL (ref 0.17–1.22)
MONOCYTES NFR BLD AUTO: 8 % (ref 4–12)
NEUTROPHILS # BLD AUTO: 7.66 THOUSANDS/ΜL (ref 1.85–7.62)
NEUTS SEG NFR BLD AUTO: 68 % (ref 43–75)
NRBC BLD AUTO-RTO: 0 /100 WBCS
OPIATES UR QL SCN: NEGATIVE
P AXIS: 105 DEGREES
P AXIS: 30 DEGREES
PCP UR QL: NEGATIVE
PLATELET # BLD AUTO: 319 THOUSANDS/UL (ref 149–390)
PMV BLD AUTO: 9.3 FL (ref 8.9–12.7)
POTASSIUM SERPL-SCNC: 3.9 MMOL/L (ref 3.5–5.3)
PR INTERVAL: 225 MS
PR INTERVAL: 225 MS
PR INTERVAL: 841 MS
QRS AXIS: 46 DEGREES
QRS AXIS: 63 DEGREES
QRS AXIS: 67 DEGREES
QRSD INTERVAL: 71 MS
QRSD INTERVAL: 71 MS
QRSD INTERVAL: 88 MS
QT INTERVAL: 383 MS
QT INTERVAL: 383 MS
QT INTERVAL: 404 MS
QTC INTERVAL: 417 MS
QTC INTERVAL: 428 MS
QTC INTERVAL: 449 MS
RBC # BLD AUTO: 3.83 MILLION/UL (ref 3.81–5.12)
SODIUM SERPL-SCNC: 143 MMOL/L (ref 136–145)
T WAVE AXIS: 114 DEGREES
T WAVE AXIS: 36 DEGREES
T WAVE AXIS: 41 DEGREES
T4 FREE SERPL-MCNC: 0.94 NG/DL (ref 0.76–1.46)
THC UR QL: NEGATIVE
TSH SERPL DL<=0.05 MIU/L-ACNC: 2.64 UIU/ML (ref 0.36–3.74)
VENTRICULAR RATE: 71 BPM
VENTRICULAR RATE: 74 BPM
VENTRICULAR RATE: 75 BPM
WBC # BLD AUTO: 11.26 THOUSAND/UL (ref 4.31–10.16)

## 2020-03-03 PROCEDURE — 84443 ASSAY THYROID STIM HORMONE: CPT | Performed by: NURSE PRACTITIONER

## 2020-03-03 PROCEDURE — 93010 ELECTROCARDIOGRAM REPORT: CPT | Performed by: INTERNAL MEDICINE

## 2020-03-03 PROCEDURE — 80307 DRUG TEST PRSMV CHEM ANLYZR: CPT | Performed by: NURSE PRACTITIONER

## 2020-03-03 PROCEDURE — 84439 ASSAY OF FREE THYROXINE: CPT | Performed by: NURSE PRACTITIONER

## 2020-03-03 PROCEDURE — 80048 BASIC METABOLIC PNL TOTAL CA: CPT | Performed by: NURSE PRACTITIONER

## 2020-03-03 PROCEDURE — 99233 SBSQ HOSP IP/OBS HIGH 50: CPT | Performed by: INTERNAL MEDICINE

## 2020-03-03 PROCEDURE — 93005 ELECTROCARDIOGRAM TRACING: CPT

## 2020-03-03 PROCEDURE — 82948 REAGENT STRIP/BLOOD GLUCOSE: CPT

## 2020-03-03 PROCEDURE — NC001 PR NO CHARGE: Performed by: NURSE PRACTITIONER

## 2020-03-03 PROCEDURE — 99222 1ST HOSP IP/OBS MODERATE 55: CPT | Performed by: NURSE PRACTITIONER

## 2020-03-03 PROCEDURE — 85025 COMPLETE CBC W/AUTO DIFF WBC: CPT | Performed by: NURSE PRACTITIONER

## 2020-03-03 PROCEDURE — 83605 ASSAY OF LACTIC ACID: CPT | Performed by: EMERGENCY MEDICINE

## 2020-03-03 RX ORDER — CALCIUM GLUCONATE 20 MG/ML
2 INJECTION, SOLUTION INTRAVENOUS ONCE
Status: COMPLETED | OUTPATIENT
Start: 2020-03-03 | End: 2020-03-03

## 2020-03-03 RX ADMIN — SODIUM CHLORIDE, SODIUM LACTATE, POTASSIUM CHLORIDE, AND CALCIUM CHLORIDE 100 ML/HR: .6; .31; .03; .02 INJECTION, SOLUTION INTRAVENOUS at 07:34

## 2020-03-03 RX ADMIN — INSULIN LISPRO 1 UNITS: 100 INJECTION, SOLUTION INTRAVENOUS; SUBCUTANEOUS at 06:03

## 2020-03-03 RX ADMIN — Medication 1 TABLET: at 09:54

## 2020-03-03 RX ADMIN — SODIUM CHLORIDE, SODIUM LACTATE, POTASSIUM CHLORIDE, AND CALCIUM CHLORIDE 100 ML/HR: .6; .31; .03; .02 INJECTION, SOLUTION INTRAVENOUS at 19:59

## 2020-03-03 RX ADMIN — CALCIUM GLUCONATE 2 G: 20 INJECTION, SOLUTION INTRAVENOUS at 09:54

## 2020-03-03 RX ADMIN — THIAMINE HCL TAB 100 MG 100 MG: 100 TAB at 09:54

## 2020-03-03 RX ADMIN — FOLIC ACID 1 MG: 1 TABLET ORAL at 09:54

## 2020-03-03 RX ADMIN — ENOXAPARIN SODIUM 40 MG: 40 INJECTION SUBCUTANEOUS at 09:54

## 2020-03-03 RX ADMIN — INSULIN LISPRO 2 UNITS: 100 INJECTION, SOLUTION INTRAVENOUS; SUBCUTANEOUS at 17:57

## 2020-03-03 NOTE — ASSESSMENT & PLAN NOTE
· Psych consult  · Patient has history of mulitple suicide attempts in the past  · Currently on 1:1 observation

## 2020-03-03 NOTE — ASSESSMENT & PLAN NOTE
· Patient reports she took her entire bottle of metoprolol and all of the other meds in prescription bottles that were "there"  She is unable to name other meds and unable to quantify  She states she took them around 1-1:30pm on 3/2  · Other outpatient meds she is on are sucralfate, thiamine, amlodipine, clonidine, doxepin, folic acid, gabapentin, hydroxyzine, lithium, loratadine, losartan, lurasidone,pantoprazole  · EKG shows NSR with no QTc elongation  · Patient was given Na bicarb, glucagon, 2L IVF- currently on vasopressor therapy     · Will hold any further glucagon given thought that she more then likely took more amlodipine then metoprolol  · Appreciate toxicology assistance with her care  · Will order psych consult when patient more awake

## 2020-03-03 NOTE — ASSESSMENT & PLAN NOTE
· Likely related to betablocker, calcium channel blocker OD   · Levophed initiated in ED for hypotension

## 2020-03-03 NOTE — ASSESSMENT & PLAN NOTE
Unclear history of siezures  Seizure precautions  Meds on hold due to possibility of overdose  Will treat if ativan if seizure observed

## 2020-03-03 NOTE — ASSESSMENT & PLAN NOTE
· Likely related to med overdose  · Does not appear infectious  · Will recheck and monitor for resolution

## 2020-03-03 NOTE — H&P
H&P- Cielo Side 1965, 48 y o  female MRN: [id number]    Unit/Bed#: [facility] Encounter: [id number]    Primary Care Provider: YONG Mead   Date and time admitted to hospital: 3/2/2020  4:40 PM        * Drug overdose, multiple drugs, intentional self-harm, initial encounter  Assessment & Plan  Patient reports she took her entire bottle of metoprolol and all of the other meds in prescription bottles that were "there"  She is unable to name other meds and unable to quantify  She states she took them around 1-1:30pm today    Other outpatient meds she is on are sucralfate, thiamine, amlodipine, clonidine, doxepin, folic acid, gabapentin, hydroxyzine, lithium, loratadine, losartan, lurasidone,pantoprazole  EKG shows NSR with no QTc elongation  Patient was given Na bicarb, glucagon, 2L IVF  Will await consult from Toxicology  Will admit to ICU SD 1 to monitor hemodynamics closely with supportive care  Will order psych consult when patient more awake      Tobacco abuse  Assessment & Plan  nicoderm patch  Smoking cessation    Bipolar I disorder, most recent episode depressed, severe without psychotic features [facility])  Assessment & Plan  Patient on multiple meds, all on hold until determined which she overdosed on  Will monitor closely for symptoms    h/o Seizure ([facility] [address] )  Assessment & Plan  Unclear history of siezures  Seizure precautions  Meds on hold due to possibility of overdose  Will treat if ativan if seizure observed    Alcohol use disorder, moderate, dependence ([facility] [address] )  Assessment & Plan  Patient reports she does not drink often, unable to quantify  Has history of alcohol abuse, will reevaluate when more awake  [facility] protocol    Essential hypertension  Assessment & Plan  Hypotensive on admission  Antihypertensives on hold    Acquired hypothyroidism  Assessment & Plan  Synthroid is not on PTA med list  Will check TSH    Hypotension  Assessment & Plan  Likely related to betablocker overdose  Levophed initiated in ED for hypotension    Lactic acidosis  Assessment & Plan  Likely related to med overdose  Does not appear infectious  Will recheck and monitor for resolution    Suicidal ideation  Assessment & Plan  Psych consult  Patient has history of mulitple suicide attempts in the past      -------------------------------------------------------------------------------------------------------------  Chief Complaint: none    History of Present Illness   HX and PE limited by: patient's ability to articulate history, events of today, slurred speech  Carmina Cortez is a 48 y o  female who presents with an intentional multiple drug overdose that patient reports as intentional but unable to articulate the event surrounding her decision  She states that she watched her son leave for work around 1:30 pm today, and then she "drank" the whole bottle of metoprolol and all of the other meds she had available  She relayed that her older son lives with her does not speak to her and is not allowing her to be in his child's life  She also admitted to attempting suicide in the past  The admission interview was difficult to follow due to patient's thick, slurred speech and her ability to stay awake  She reports smoking, denies drinking, illicit drug use  She denied recent illness, sick contacts, fever, aches, chills  She was noted to have a non productive cough, which she reported as chronic  Her PMH on chart review reveals anxiety, depression, bipolar, PTSD, hypertension, hyperlipidemia  She has seizure disorder listed in chart but patient denies  History obtained from chart review  -------------------------------------------------------------------------------------------------------------  Dispo:  Admit to Stepdown Level 1    Code Status: Prior  --------------------------------------------------------------------------------------------------------------  Review of Systems   Unable to perform ROS: Acuity of condition       Review of systems was unable to be performed secondary to patient's ability to articulate, stay awake    Physical Exam   Constitutional: She is oriented to person, place, and time  She appears well-developed and well-nourished  Somnolent, slurred speech, difficult to understand   HENT:   Head: Normocephalic and atraumatic  Right Ear: External ear normal    Left Ear: External ear normal    Nose: Nose normal    Mouth/Throat: Oropharynx is clear and moist    Eyes: Pupils are equal, round, and reactive to light  Conjunctivae and EOM are normal    Neck: Normal range of motion  Neck supple  Cardiovascular: Normal rate, regular rhythm, normal heart sounds and intact distal pulses  No murmur heard  Pulmonary/Chest: Effort normal and breath sounds normal    Abdominal: Soft  Bowel sounds are normal    Musculoskeletal: Normal range of motion  She exhibits no edema  Neurological: She is oriented to person, place, and time  Easily arousable   Skin: Skin is warm and dry  Capillary refill takes less than 2 seconds  Psychiatric:   Depressive mood, unable to articulate thoughts  Admits to suicidal intent   Nursing note and vitals reviewed  --------------------------------------------------------------------------------------------------------------  Vitals:   Vitals:    03/02/20 1820 03/02/20 1830 03/02/20 1900 03/02/20 1942   BP:  91/55 95/53 94/53   BP Location:  Right arm Right arm Right arm   Pulse:   70 72   Resp:   16 16   Temp: 98 1 °F (36 7 °C)      TempSrc: Temporal      SpO2:   96% 97%   Weight:         Temp  Min: 98 1 °F (36 7 °C)  Max: 98 1 °F (36 7 °C)  IBW: -92 5 kg     Body mass index is 37 58 kg/m²      Laboratory and Diagnostics:  Results from last 7 days   Lab Units 03/02/20  1704   WBC Thousand/uL 12 21*   HEMOGLOBIN g/dL 13 5   HEMATOCRIT % 40 8   PLATELETS Thousands/uL 399*   NEUTROS PCT % 67   MONOS PCT % 7     Results from last 7 days   Lab Units 03/02/20  1704   SODIUM mmol/L 137   POTASSIUM mmol/L 4 7 CHLORIDE mmol/L 101   CO2 mmol/L 25   ANION GAP mmol/L 11   BUN mg/dL 10   CREATININE mg/dL 1 14   CALCIUM mg/dL 9 5   GLUCOSE RANDOM mg/dL 189*   ALT U/L 30   AST U/L 16   ALK PHOS U/L 100   ALBUMIN g/dL 3 7   TOTAL BILIRUBIN mg/dL 0 28               Results from last 7 days   Lab Units 03/02/20  1704   TROPONIN I ng/mL <0 02     Results from last 7 days   Lab Units 03/02/20  1652   LACTIC ACID mmol/L 2 3*     ABG:    VBG:          Micro:        EKG: NSR  Imaging: I have personally reviewed pertinent reports  Historical Information   Past Medical History:   Diagnosis Date    Alcohol abuse     Alcoholism ([facility] )     Anxiety     Bipolar disorder (HCC)     Bowel obstruction (HCC)     Depression     Gastritis     Head injury     Heart palpitations     Hyperlipidemia     Hypertension     Hypothyroidism     PTSD (post-traumatic stress disorder)     Seizure ([facility] )     Sleep difficulties     Suicide attempt [facility])      Past Surgical History:   Procedure Laterality Date    ABDOMINAL SURGERY      APPENDECTOMY      BOWEL RESECTION      CHOLECYSTECTOMY      ESOPHAGOGASTRODUODENOSCOPY N/A 5/18/2018    Procedure: ESOPHAGOGASTRODUODENOSCOPY (EGD) with bx;  Surgeon: Kilo Ayon DO;  Location: [facility];   Service: Gastroenterology    HYSTERECTOMY      KNEE SURGERY Bilateral      Social History   Social History     Substance and Sexual Activity   Alcohol Use Yes    Alcohol/week: 21 0 standard drinks    Types: 21 Cans of beer per week    Frequency: Patient refused    Drinks per session: Patient refused    Binge frequency: Patient refused    Comment: claims sober x2 months     Social History     Substance and Sexual Activity   Drug Use No     Social History     Tobacco Use   Smoking Status Current Every Day Smoker    Packs/day: 1 00    Years: 25 00    Pack years: 25 00    Types: Cigarettes   Smokeless Tobacco Never Used     Exercise History: n/a  Family History:   Family History   Problem Relation Age of Onset    Diabetes Father      Family history unknown and I have reviewed this patient's family history and commented on sigificant items within the HPI      Medications:  Current Facility-Administered Medications   Medication Dose Route Frequency    [START ON 9/5/2900] folic acid (FOLVITE) tablet 1 mg  1 mg Oral Daily    lactated ringers infusion  100 mL/hr Intravenous Continuous    [START ON 3/3/2020] multivitamin-minerals (CENTRUM) tablet 1 tablet  1 tablet Oral Daily    norepinephrine (LEVOPHED) 4 mg (STANDARD CONCENTRATION) IV in sodium chloride 0 9% 250 mL  1-30 mcg/min Intravenous Titrated    [START ON 3/3/2020] thiamine (VITAMIN B1) tablet 100 mg  100 mg Oral Daily     Home medications:  Prior to Admission Medications   Prescriptions Last Dose Informant Patient Reported? Taking?    amLODIPine (NORVASC) 5 mg tablet   No No   Sig: Take 1 tablet (5 mg total) by mouth 2 (two) times a day   cloNIDine (CATAPRES) 0 1 mg tablet   No No   Sig: Take 1 tablet (0 1 mg total) by mouth 2 (two) times a day   doxepin (SINEquan) 50 mg capsule   No No   Sig: Take 1 capsule (50 mg total) by mouth daily at bedtime   folic acid (FOLVITE) 1 mg tablet   No No   Sig: Take 1 tablet (1 mg total) by mouth daily   gabapentin (NEURONTIN) 300 mg capsule   No No   Sig: Take 2 capsules (600 mg total) by mouth daily at bedtime   gabapentin (NEURONTIN) 400 mg capsule   No No   Sig: Take 1 capsule (400 mg total) by mouth 3 (three) times a day   hydrOXYzine HCL (ATARAX) 25 mg tablet   No No   Sig: Take 1 tablet (25 mg total) by mouth every 6 (six) hours as needed for itching   lithium carbonate 150 mg capsule   No No   Sig: Take 3 capsules (450 mg total) by mouth daily at bedtime   lithium carbonate 300 mg capsule   No No   Sig: Take 1 capsule (300 mg total) by mouth every morning   loratadine (CLARITIN) 10 mg tablet   No No   Sig: Take 1 tablet (10 mg total) by mouth daily   losartan (COZAAR) 100 MG tablet   No No   Sig: Take 1 tablet (100 mg total) by mouth daily   lurasidone (LATUDA) 80 mg tablet   No No   Sig: Take 1 tablet (80 mg total) by mouth daily with dinner   metoprolol tartrate (LOPRESSOR) 50 mg tablet   No No   Sig: Take 1 tablet (50 mg total) by mouth every 12 (twelve) hours   pantoprazole (PROTONIX) 40 mg tablet   No No   Sig: Take 1 tablet (40 mg total) by mouth daily in the early morning   sucralfate (CARAFATE) 1 g tablet   No No   Sig: Take 1 tablet (1 g total) by mouth 4 (four) times a day (before meals and at bedtime)   thiamine 100 MG tablet   No No   Sig: Take 1 tablet (100 mg total) by mouth daily      Facility-Administered Medications: None     Allergies: Allergies   Allergen Reactions    Latex Rash       ------------------------------------------------------------------------------------------------------------  Advance Directive and Living Will:      Power of :    POLST:    ------------------------------------------------------------------------------------------------------------  Anticipated Length of Stay is > 2 midnights    Care Time Delivered:   No Critical Care time spent       YONG Meza        Portions of the record may have been created with voice recognition software  Occasional wrong word or "sound a like" substitutions may have occurred due to the inherent limitations of voice recognition software    Read the chart carefully and recognize, using context, where substitutions have occurred

## 2020-03-03 NOTE — ED PROVIDER NOTES
History  Chief Complaint   Patient presents with    Overdose - Intentional     Patient reports taking over "30 pills" to try to harm herself  Patient reports taking blood pressure medications  Reports history of SI attempts  Patientpresents lethargic and slow to answer questions  Reports taking other medications as welll but is unsure of what else  51-year-old woman with past medical history of depression, bipolar disorder, PTSD, alcohol abuse presents for evaluation of intentional overdose  Patient states that 1 hour prior to arrival she took an entire bottle of metoprolol and at least 2 other of her prescription medications  She is unsure of which medication she states took but she states that 1 of the medications helps her with sleep  She admits to intentionally overdosing in attempt to take her life  1 hour after taking the medication she started fell unsteady on her feet  She then called EMS  Patient had an IV placed and was started on IV fluids  Her initial systolic blood pressure was 65  She was given 2 amps of sodium by car and 1 g of glucagon IV empirically by attending physician as patient was found to have calcium channel blockers, beta blockers, doxepin on her medication list   Unable to obtain review of systems secondary to mental status change  Patient has somnolent but easily arousable  Prior to Admission Medications   Prescriptions Last Dose Informant Patient Reported? Taking?    amLODIPine (NORVASC) 5 mg tablet   No No   Sig: Take 1 tablet (5 mg total) by mouth 2 (two) times a day   cloNIDine (CATAPRES) 0 1 mg tablet   No No   Sig: Take 1 tablet (0 1 mg total) by mouth 2 (two) times a day   doxepin (SINEquan) 50 mg capsule   No No   Sig: Take 1 capsule (50 mg total) by mouth daily at bedtime   folic acid (FOLVITE) 1 mg tablet   No No   Sig: Take 1 tablet (1 mg total) by mouth daily   gabapentin (NEURONTIN) 300 mg capsule   No No   Sig: Take 2 capsules (600 mg total) by mouth daily at bedtime   gabapentin (NEURONTIN) 400 mg capsule   No No   Sig: Take 1 capsule (400 mg total) by mouth 3 (three) times a day   hydrOXYzine HCL (ATARAX) 25 mg tablet   No No   Sig: Take 1 tablet (25 mg total) by mouth every 6 (six) hours as needed for itching   lithium carbonate 150 mg capsule   No No   Sig: Take 3 capsules (450 mg total) by mouth daily at bedtime   lithium carbonate 300 mg capsule   No No   Sig: Take 1 capsule (300 mg total) by mouth every morning   loratadine (CLARITIN) 10 mg tablet   No No   Sig: Take 1 tablet (10 mg total) by mouth daily   losartan (COZAAR) 100 MG tablet   No No   Sig: Take 1 tablet (100 mg total) by mouth daily   lurasidone (LATUDA) 80 mg tablet   No No   Sig: Take 1 tablet (80 mg total) by mouth daily with dinner   metoprolol tartrate (LOPRESSOR) 50 mg tablet   No No   Sig: Take 1 tablet (50 mg total) by mouth every 12 (twelve) hours   pantoprazole (PROTONIX) 40 mg tablet   No No   Sig: Take 1 tablet (40 mg total) by mouth daily in the early morning   sucralfate (CARAFATE) 1 g tablet   No No   Sig: Take 1 tablet (1 g total) by mouth 4 (four) times a day (before meals and at bedtime)   thiamine 100 MG tablet   No No   Sig: Take 1 tablet (100 mg total) by mouth daily      Facility-Administered Medications: None       Past Medical History:   Diagnosis Date    Alcohol abuse     Alcoholism ([place] [address] )     Anxiety     Bipolar disorder (HCC)     Bowel obstruction (HCC)     Depression     Gastritis     Head injury     Heart palpitations     Hyperlipidemia     Hypertension     Hypothyroidism     PTSD (post-traumatic stress disorder)     [facility]izure ([facility] )     Sleep difficulties     Suicide attempt [facility])        Past Surgical History:   Procedure Laterality Date    ABDOMINAL SURGERY      APPENDECTOMY      BOWEL RESECTION      CHOLECYSTECTOMY      ESOPHAGOGASTRODUODENOSCOPY N/A 5/18/2018    Procedure: ESOPHAGOGASTRODUODENOSCOPY (EGD) with bx;  Surgeon: Tiffanie Huang DO;  Location: [facility]; Service: Gastroenterology    HYSTERECTOMY      KNEE SURGERY Bilateral        Family History   Problem Relation Age of Onset    Diabetes Father      I have reviewed and agree with the history as documented  E-Cigarette/Vaping    E-Cigarette Use Never User      E-Cigarette/Vaping Substances     Social History     Tobacco Use    Smoking status: Current Every Day Smoker     Packs/day: 1 00     Years: 25 00     Pack years: 25 00     Types: Cigarettes    Smokeless tobacco: Never Used   Substance Use Topics    Alcohol use: Yes     Alcohol/week: 21 0 standard drinks     Types: 21 Cans of beer per week     Frequency: Patient refused     Drinks per session: Patient refused     Binge frequency: Patient refused     Comment: claims sober x2 months    Drug use: No        Review of Systems   Unable to perform ROS: Mental status change       Physical Exam  ED Triage Vitals   Temperature Pulse Respirations Blood Pressure SpO2   03/02/20 1820 03/02/20 1644 03/02/20 1644 03/02/20 1644 03/02/20 1644   98 1 °F (36 7 °C) 70 12 (!) 80/51 90 %      Temp Source Heart Rate Source Patient Position - Orthostatic VS BP Location FiO2 (%)   03/02/20 1820 03/02/20 1644 03/02/20 1644 03/02/20 1644 --   Temporal Monitor Lying Left arm       Pain Score       03/02/20 1644       No Pain             Orthostatic Vital Signs  Vitals:    03/05/20 0843 03/05/20 0848 03/05/20 1355 03/05/20 1400   BP: 135/87 135/87  135/87   Pulse: 104 (!) 136 96 96   Patient Position - Orthostatic VS:           Physical Exam   Constitutional: She appears well-developed and well-nourished  No distress  Hypotensive   HENT:   Head: Normocephalic and atraumatic  Eyes: Conjunctivae are normal  No scleral icterus  Neck: Normal range of motion  Neck supple  Cardiovascular: Normal rate, regular rhythm, normal heart sounds and intact distal pulses  No murmur heard  Pulmonary/Chest: Effort normal and breath sounds normal  No stridor  No respiratory distress  She has no wheezes  She has no rales  She exhibits no tenderness  Abdominal: Soft  Bowel sounds are normal  She exhibits no distension and no mass  There is no tenderness  There is no guarding  Musculoskeletal: Normal range of motion  She exhibits no edema, tenderness or deformity  Neurological: She is disoriented  Somnolent  Easily arousable  Moving all extremities spontaneously  Skin: Skin is warm and dry  Capillary refill takes less than 2 seconds  No rash noted  She is not diaphoretic  No erythema  No pallor  Psychiatric: She has a normal mood and affect  Her behavior is normal    Nursing note and vitals reviewed        ED Medications  Medications   enoxaparin (LOVENOX) subcutaneous injection 40 mg (40 mg Subcutaneous Given 3/5/20 0839)   thiamine (VITAMIN B1) tablet 100 mg (100 mg Oral Given 7/2/14 8189)   folic acid (FOLVITE) tablet 1 mg (1 mg Oral Given 3/5/20 0839)   multivitamin-minerals (CENTRUM) tablet 1 tablet (1 tablet Oral Given 3/5/20 0839)   acetaminophen (TYLENOL) tablet 650 mg ( Oral MAR Unhold 3/4/20 1436)   amLODIPine (NORVASC) tablet 5 mg (5 mg Oral Given 3/5/20 0838)   cloNIDine (CATAPRES) tablet 0 1 mg (0 1 mg Oral Given 5/3/86 0898)   folic acid (FOLVITE) tablet 1 mg (1 mg Oral Given 3/5/20 0840)   gabapentin (NEURONTIN) capsule 400 mg (400 mg Oral Given 3/5/20 0839)   lithium carbonate capsule 450 mg (450 mg Oral Given 3/4/20 2123)   lithium carbonate capsule 300 mg (300 mg Oral Given 3/5/20 0840)   lurasidone (LATUDA) tablet 80 mg (80 mg Oral Given 3/4/20 1735)   losartan (COZAAR) tablet 100 mg (100 mg Oral Given 3/5/20 0839)   pantoprazole (PROTONIX) EC tablet 40 mg (40 mg Oral Given 3/5/20 0658)   sucralfate (CARAFATE) tablet 1 g (1 g Oral Given 3/5/20 1229)   diphenhydrAMINE (BENADRYL) tablet 25 mg (25 mg Oral Given 3/4/20 1834)   metoprolol tartrate (LOPRESSOR) partial tablet 12 5 mg (12 5 mg Oral Given 3/5/20 7500)   nicotine (NICODERM CQ) 21 mg/24 hr TD 24 hr patch 21 mg (21 mg Transdermal Medication Applied 3/5/20 1331)   sodium bicarbonate 8 4 % injection **ADS Override Pull** (100 mEq  Given 3/2/20 1651)   glucagon (GLUCAGEN) injection 1 mg (1 mg Intravenous Given 3/2/20 1654)   lactated ringers bolus 1,000 mL (1,000 mL Intravenous New Bag 3/2/20 2000)   calcium gluconate 2 g in sodium chloride 0 9% 100 mL (premix) (0 g Intravenous Stopped 3/3/20 1054)       Diagnostic Studies  Results Reviewed     Procedure Component Value Units Date/Time    Lactic acid, plasma x2 [[id number]]  (Normal) Collected:  03/03/20 0435    Lab Status:  Final result Specimen:  Blood from Arm, Right Updated:  03/03/20 0506     LACTIC ACID 1 4 mmol/L     Narrative:       Result may be elevated if tourniquet was used during collection  Rapid drug screen, urine [[id number]]  (Normal) Collected:  03/03/20 0222    Lab Status:  Final result Specimen:  Urine, Clean Catch Updated:  03/03/20 0310     Amph/Meth UR Negative     Barbiturate Ur Negative     Benzodiazepine Urine Negative     Cocaine Urine Negative     Methadone Urine Negative     Opiate Urine Negative     PCP Ur Negative     THC Urine Negative    Narrative:       FOR MEDICAL PURPOSES ONLY  IF CONFIRMATION NEEDED PLEASE CONTACT THE LAB WITHIN 5 DAYS  Drug Screen Cutoff Levels:  AMPHETAMINE/METHAMPHETAMINES  1000 ng/mL  BARBITURATES     200 ng/mL  BENZODIAZEPINES     200 ng/mL  COCAINE      300 ng/mL  METHADONE      300 ng/mL  OPIATES      300 ng/mL  PHENCYCLIDINE     25 ng/mL  THC       50 ng/mL      TSH, 3rd generation [[id number]]  (Abnormal) Collected:  03/02/20 1704    Lab Status:  Final result Specimen:  Blood from Arm, Left Updated:  03/02/20 2038     TSH 3RD GENERATON 8 919 uIU/mL     Narrative:       Patients undergoing fluorescein dye angiography may retain small amounts of fluorescein in the body for 48-72 hours post procedure  Samples containing fluorescein can produce falsely depressed TSH values   If the patient had this procedure,a specimen should be resubmitted post fluorescein clearance  Lactic acid, plasma x2 [[id number]]  (Abnormal) Collected:  03/02/20 1652    Lab Status:  Final result Specimen:  Blood from Arm, Left Updated:  03/02/20 1759     LACTIC ACID 2 3 mmol/L     Narrative:       Result may be elevated if tourniquet was used during collection  Lithium level [[id number]]  (Abnormal) Collected:  03/02/20 1706    Lab Status:  Final result Specimen:  Blood from Arm, Left Updated:  03/02/20 1747     Lithium Lvl 0 3 mmol/L     Ethanol [[id number]]  (Normal) Collected:  03/02/20 1704    Lab Status:  Final result Specimen:  Blood from Arm, Left Updated:  03/02/20 1743     Ethanol Lvl <3 mg/dL     Lipase [[id number]]  (Normal) Collected:  03/02/20 1704    Lab Status:  Final result Specimen:  Blood from Arm, Left Updated:  03/02/20 1743     Lipase 442 u/L     Salicylate level [[id number]]  (Normal) Collected:  03/02/20 1704    Lab Status:  Final result Specimen:  Blood from Arm, Left Updated:  28/01/97 5040     Salicylate Lvl 5 3 mg/dL     Acetaminophen level-If concentration is detectable, please discuss with medical  on call   [[id number]]  (Abnormal) Collected:  03/02/20 1704    Lab Status:  Final result Specimen:  Blood from Arm, Left Updated:  03/02/20 1743     Acetaminophen Level <2 ug/mL     Comprehensive metabolic panel [[id number]]  (Abnormal) Collected:  03/02/20 1704    Lab Status:  Final result Specimen:  Blood from Arm, Left Updated:  03/02/20 1735     Sodium 137 mmol/L      Potassium 4 7 mmol/L      Chloride 101 mmol/L      CO2 25 mmol/L      ANION GAP 11 mmol/L      BUN 10 mg/dL      Creatinine 1 14 mg/dL      Glucose 189 mg/dL      Calcium 9 5 mg/dL      AST 16 U/L      ALT 30 U/L      Alkaline Phosphatase 100 U/L      Total Protein 7 2 g/dL      Albumin 3 7 g/dL      Total Bilirubin 0 28 mg/dL      eGFR 55 ml/min/1 73sq m     Narrative:       [place] guidelines for Chronic Kidney Disease (CKD):     Stage 1 with normal or high GFR (GFR > 90 mL/min/1 73 square meters)    Stage 2 Mild CKD (GFR = 60-89 mL/min/1 73 square meters)    Stage 3A Moderate CKD (GFR = 45-59 mL/min/1 73 square meters)    Stage 3B Moderate CKD (GFR = 30-44 mL/min/1 73 square meters)    Stage 4 Severe CKD (GFR = 15-29 mL/min/1 73 square meters)    Stage 5 End Stage CKD (GFR <15 mL/min/1 73 square meters)  Note: GFR calculation is accurate only with a steady state creatinine    Troponin I [[id number]]  (Normal) Collected:  03/02/20 1704    Lab Status:  Final result Specimen:  Blood from Arm, Left Updated:  03/02/20 1730     Troponin I <0 02 ng/mL     CBC and differential [[id number]]  (Abnormal) Collected:  03/02/20 1704    Lab Status:  Final result Specimen:  Blood from Arm, Left Updated:  03/02/20 1713     WBC 12 21 Thousand/uL      RBC 4 22 Million/uL      Hemoglobin 13 5 g/dL      Hematocrit 40 8 %      MCV 97 fL      MCH 32 0 pg      MCHC 33 1 g/dL      RDW 12 7 %      MPV 9 5 fL      Platelets 128 Thousands/uL      nRBC 0 /100 WBCs      Neutrophils Relative 67 %      Immat GRANS % 1 %      Lymphocytes Relative 25 %      Monocytes Relative 7 %      Eosinophils Relative 0 %      Basophils Relative 0 %      Neutrophils Absolute 8 21 Thousands/µL      Immature Grans Absolute 0 06 Thousand/uL      Lymphocytes Absolute 2 99 Thousands/µL      Monocytes Absolute 0 89 Thousand/µL      Eosinophils Absolute 0 02 Thousand/µL      Basophils Absolute 0 04 Thousands/µL     Fingerstick Glucose (POCT) [[id number]]  (Abnormal) Collected:  03/02/20 1649    Lab Status:  Final result Updated:  03/02/20 1650     POC Glucose 171 mg/dl                  No orders to display         Procedures  ECG 12 Lead Documentation Only  Date/Time: 3/2/2020 6:30 PM  Performed by: Girish Garza MD  Authorized by: Girish Garza MD     Indications / Diagnosis:  Repeat - concern for TCA overdose  Patient location:  ED and bedside  Previous ECG:     Previous ECG:  Compared to current  Interpretation:     Interpretation: non-specific    Rate:     ECG rate:  70    ECG rate assessment: normal    Rhythm:     Rhythm: sinus rhythm    Ectopy:     Ectopy: none    QRS:     QRS axis:  Normal    QRS intervals:  Normal  Conduction:     Conduction: normal    ST segments:     ST segments:  Normal  T waves:     T waves: normal    Comments:      Stable QRS duration  ED Course                               MDM  Number of Diagnoses or Management Options  Hypotension: new and requires workup  Intentional drug overdose [facility]): new and requires workup  Overdose: new and requires workup  Diagnosis management comments: 72-year-old presents for evaluation intentional overdose prescription medication  Patient states that she took an entire bottle of her metoprolol as well as a least 2 other medications  Patient is prescribed doxepin, lithium, calcium channel blockers, gabapentin as well  Patient hypotensive with a normal heart rate initially  She was found easily arousable  No QRS widening on serial EKGs  Likely calcium channel versus beta-blocker overdose  Patient given 2 amps of sodium bicarb, 1 g of IV glucose on on arrival     Patient given 2 L of normal saline with no improvement in blood pressure  Patient started on 5 mcg of Levophed with improvement of maps to greater than 65  No acute change in mental status during observation the emergency department  Discussed with toxicology on-call Dr Dillon Mahan  Recommendations include pressors as needed to maintain a normal blood pressure  Will admit patient to ICU for further evaluation  Broad lab workup including co ingestions, coma panel, UDS           Amount and/or Complexity of Data Reviewed  Clinical lab tests: ordered and reviewed  Decide to obtain previous medical records or to obtain history from someone other than the patient: yes  Obtain history from someone other than the patient: yes  Review and summarize past medical records: yes  Independent visualization of images, tracings, or specimens: yes    Risk of Complications, Morbidity, and/or Mortality  Presenting problems: high  Diagnostic procedures: high  Management options: high    Patient Progress  Patient progress: stable        Disposition  Final diagnoses:   Overdose   Intentional drug overdose ([facility] )   Hypotension     Time reflects when diagnosis was documented in both MDM as applicable and the Disposition within this note     Time User Action Codes Description Comment    3/2/2020  5:12 PM Estrella Bain Juliana Suazo Overdose     3/2/2020  6:44 PM Riley, [address] [place] [address] Intentional drug overdose ([facility] )     3/2/2020  6:44 PM Estrella Bain [I95 9] Hypotension       ED Disposition     ED Disposition Condition Date/Time Comment    Admit Stable Mon Mar 2, 2020  6:44 PM Case was discussed with Critical Care and the patient's admission status was agreed to be Admission Status: inpatient status to the service of Dr Melany Fregoso  Follow-up Information    None         Current Discharge Medication List      CONTINUE these medications which have NOT CHANGED    Details   amLODIPine (NORVASC) 5 mg tablet Take 1 tablet (5 mg total) by mouth 2 (two) times a day  Qty: 60 tablet, Refills: 1    Associated Diagnoses: Essential hypertension      cloNIDine (CATAPRES) 0 1 mg tablet Take 1 tablet (0 1 mg total) by mouth 2 (two) times a day  Qty: 60 tablet, Refills: 0    Associated Diagnoses: Essential hypertension      doxepin (SINEquan) 50 mg capsule Take 1 capsule (50 mg total) by mouth daily at bedtime  Qty: 30 capsule, Refills: 0    Associated Diagnoses: Bipolar I disorder, most recent episode depressed, severe without psychotic features ([facility])      folic acid (FOLVITE) 1 mg tablet Take 1 tablet (1 mg total) by mouth daily  Qty: 30 tablet, Refills: 0    Associated Diagnoses: Alcohol use disorder, severe, dependence ([facility] )      ! ! gabapentin (NEURONTIN) 300 mg capsule Take 2 capsules (600 mg total) by mouth daily at bedtime  Qty: 60 capsule, Refills: 1    Associated Diagnoses: Bipolar I disorder, most recent episode depressed, severe without psychotic features ([facility] )      ! ! gabapentin (NEURONTIN) 400 mg capsule Take 1 capsule (400 mg total) by mouth 3 (three) times a day  Qty: 90 capsule, Refills: 1    Associated Diagnoses: Generalized anxiety disorder      hydrOXYzine HCL (ATARAX) 25 mg tablet Take 1 tablet (25 mg total) by mouth every 6 (six) hours as needed for itching  Qty: 30 tablet, Refills: 0    Associated Diagnoses: Anxiety      !! lithium carbonate 150 mg capsule Take 3 capsules (450 mg total) by mouth daily at bedtime  Qty: 180 capsule, Refills: 1    Associated Diagnoses: Bipolar I disorder, most recent episode depressed, severe without psychotic features ([facility] )      ! ! lithium carbonate 300 mg capsule Take 1 capsule (300 mg total) by mouth every morning  Qty: 30 capsule, Refills: 1    Associated Diagnoses: Bipolar I disorder, most recent episode depressed, severe without psychotic features (HCC)      loratadine (CLARITIN) 10 mg tablet Take 1 tablet (10 mg total) by mouth daily  Qty: 30 tablet, Refills: 0    Associated Diagnoses:  Allergy      losartan (COZAAR) 100 MG tablet Take 1 tablet (100 mg total) by mouth daily  Qty: 30 tablet, Refills: 1    Associated Diagnoses: Essential hypertension      lurasidone (LATUDA) 80 mg tablet Take 1 tablet (80 mg total) by mouth daily with dinner  Qty: 30 tablet, Refills: 1    Associated Diagnoses: Bipolar affective disorder, depressed, severe (HCC)      metoprolol tartrate (LOPRESSOR) 50 mg tablet Take 1 tablet (50 mg total) by mouth every 12 (twelve) hours  Qty: 60 tablet, Refills: 0    Associated Diagnoses: Essential hypertension      pantoprazole (PROTONIX) 40 mg tablet Take 1 tablet (40 mg total) by mouth daily in the early morning  Qty: 30 tablet, Refills: 0    Associated Diagnoses: GERD (gastroesophageal reflux disease)      sucralfate (CARAFATE) 1 g tablet Take 1 tablet (1 g total) by mouth 4 (four) times a day (before meals and at bedtime)  Qty: 120 tablet, Refills: 1    Associated Diagnoses: Gastritis without bleeding, unspecified chronicity, unspecified gastritis type      thiamine 100 MG tablet Take 1 tablet (100 mg total) by mouth daily  Qty: 30 tablet, Refills: 0    Associated Diagnoses: Alcohol use disorder, moderate, dependence ([facility] )       ! ! - Potential duplicate medications found  Please discuss with provider  No discharge procedures on file  PDMP Review     None           ED Provider  Attending physically available and evaluated Bita Leon I managed the patient along with the ED Attending      Electronically Signed by         Marley Watson MD  03/05/20 1661

## 2020-03-03 NOTE — PROGRESS NOTES
Progress Note - Todd Scales 1965, 48 y o  female MRN: [id number]    Unit/Bed#: ICU 05 Encounter: [id number]    Primary Care Provider: YONG Carcamo   Date and time admitted to hospital: 3/2/2020  4:40 PM        * Drug overdose, multiple drugs, intentional self-harm, initial encounter  Assessment & Plan  · Patient reports she took her entire bottle of metoprolol and all of the other meds in prescription bottles that were "there"  She is unable to name other meds and unable to quantify   Other outpatient meds she is on are sucralfate, thiamine, amlodipine, clonidine, doxepin, folic acid, gabapentin, hydroxyzine, lithium, loratadine, losartan, lurasidone,pantoprazole  · Toxicology consulted  · Psych consulted    Generalized anxiety disorder  Assessment & Plan  · Will defer med management to psychiatry recommendations    Tobacco abuse  Assessment & Plan  · Nicoderm patch  · Smoking cessation    h/o Seizure ([facility] )  Assessment & Plan  · Unclear history of siezures, patient denies history  · Seizure precautions  · Meds on hold due to possibility of overdose  · Will treat if ativan if seizure observed    Alcohol use disorder, moderate, dependence ([facility] )  Assessment & Plan  · Patient reports she does not drink often, unable to quantify  · Has history of alcohol abuse, will reevaluate when more awake  · CIWA protocol    Hypotension  Assessment & Plan  · Now resolved, likely related to betablocker, calcium channel blocker OD       Suicide attempt ([facility] )  Assessment & Plan  · Will maintain 1:1 observation for now    Bipolar I disorder, most recent episode depressed, severe without psychotic features ([facility] )  Assessment & Plan  · Drug screen is negative for illicit agents  · Not sure which drugs she may have ingested, possibly amlodipine, clonidine and metoprolol      Essential hypertension  Assessment & Plan  · Hypotensive on admission, started on levophed, given calcium with improvement of BP  · Off of levophed since 3/3 9am  · Antihypertensives on hold until BP remains normal    Acquired hypothyroidism  Assessment & Plan  · Synthroid is not on PTA med list  · Elevated TSH>8  · Free t4 pending    Suicidal ideation  Assessment & Plan  · Psych consult  · Patient has history of mulitple suicide attempts in the past  · Currently on 1:1 observation      Code Status: Level 1 - Full Code  POA:    POLST:      Reason for ICU admission:   Intentional overdose, hypotension    Active problems:   Principal Problem:    Drug overdose, multiple drugs, intentional self-harm, initial encounter  Active Problems:    Alcohol use disorder, moderate, dependence ([facility] )    h/o Seizure ([facility] )    Tobacco abuse    Generalized anxiety disorder    [facility]    Hypotension    Bipolar I disorder, most recent episode depressed, severe without psychotic features ([facility] )    Essential hypertension    Acquired hypothyroidism    Suicidal ideation    Lactic acidosis  Resolved Problems:    * No resolved hospital problems  *      Consultants:   Toxicology, Psychiatry    History of Present Illness:   Patient reportedly called EMS and admitted to overdosing on metoprolol and multiple other meds  Patient was on mulitple meds and unable to identify or quantify amount  See list of PTA meds  She was noted to be somnolent and hypotensive on admission  Summary of clinical course:   Patient was given IVF boluses and started on levophed peripherally when her BP did not improve  She was treated with glucagon, sodium bicarbonate in the ED as well  In the ICU patient remained somnolent, easily arousable and was weaned off of levophed following the administration of calcium     Recent or scheduled procedures:   none    Outstanding/pending diagnostics:   none    Cultures:   none       Mobilization Plan:   Ambulate    Nutrition Plan:   Regular diet    VTE Pharmacologic Prophylaxis: Enoxaparin (Lovenox)  VTE Mechanical Prophylaxis: sequential compression device    Discharge Plan:   Patient should be ready for discharge to inpatient psych on 3/5  Initial Physical Therapy Recommendations: n/a  Initial Occupational Therapy Recommendations: n/a  Initial /Plan: n/a    Home medications that are not reordered and reason why:   Patient's home meds are on hold until BP remains normotensive and psych consult  Spoke with Dr Cathy Albrecht  regarding transfer  Please call [phone] with any questions or concerns  Portions of the record may have been created with voice recognition software  Occasional wrong word or "sound a like" substitutions may have occurred due to the inherent limitations of voice recognition software  Read the chart carefully and recognize, using context, where substitutions have occurred

## 2020-03-03 NOTE — ASSESSMENT & PLAN NOTE
· Unclear history of siezures, patient denies history  · Seizure precautions  · Meds on hold due to possibility of overdose  · Will treat if ativan if seizure observed

## 2020-03-03 NOTE — ASSESSMENT & PLAN NOTE
· Drug screen is negative for illicit agents  · Not sure which drugs she may have ingested, possibly amlodipine, clonidine and metoprolol

## 2020-03-03 NOTE — ASSESSMENT & PLAN NOTE
· Patient reports she does not drink often, unable to quantify  · Has history of alcohol abuse, will reevaluate when more awake  · CIWA protocol

## 2020-03-03 NOTE — CONSULTS
Consultation - Behavioral Health     Identification Data: Matilde Daniels 48 y o  female MRN: [id number]  Unit/Bed#: ICU 05 Encounter: [id number]    03/03/20  1:24 PM    Consults  Physician Requesting Consult: Teo Jackson MD  Principal Problem:Drug overdose, multiple drugs, intentional self-harm, initial encounter    Reason for Consult:  suicide attempt by overdose    History of Present Illness     Matilde Daniels is a 48 y o  female with a history of mood disorder who was admitted to the medical service on 3/2/2020 due to intentional drug overdose  Psychiatric consultation was requested due to depression and suicide attempt by overdose on medications  Psychiatric symptoms prior to admission included suicidal behavior and suicidal gesture  Onset of symptoms was abrupt starting a few hours prior to attempt with rapidly worsening course since that time  Stressors preceding admission included Argument with son, making suicidal threats  Assessment/Plan     Principal Problem:    Drug overdose, multiple drugs, intentional self-harm, initial encounter  Active Problems:    Essential hypertension    Acquired hypothyroidism    Alcohol use disorder, moderate, dependence ([facility])    h/o Seizure ([facility])    Bipolar I disorder, most recent episode depressed, severe without psychotic features ([facility] )    Tobacco abuse    Generalized anxiety disorder    Suicidal ideation    Lactic acidosis    Suicide attempt ([facility] )    Hypotension      Assessment:  On assessment patient remains lethargic and has difficulty maintaining wakefulness  Patient verbally agrees to to on admission but is unable to complete to on paperwork at this time  Patient states that overdose was intentional although she states that she did not intend to die  States that the overdose would was suicidal gesture with intention of getting attention of her son  Patient states that she has made suicidal threats the son passed and he says you wont do it    Patient states I am grandmother and he will let me see my grandchild  Patient unable to give more detailed history at this time  On review of the chart patient has 5 previous inpatient psychiatric hospitalizations for suicidal behavior and suicide attempts in the course of past 8 months  Pattern of intentional overdose and suicidal gestures    Plan/Recommendation:  Recommend admission for psychiatric evaluation and stabilization  Patient appears to be willing to sign into 201 at this time but is physically unable to sign paperwork due to lethargy       Psychiatric Review Of Systems:    Sleep changes: unknown  Appetite changes: unknown  Weight changes: unknown  Energy/anergy: unknown  Interest/pleasure/anhedonia: unknown  Somatic symptoms: no  Anxiety/panic: worrying daily  Daksha: Unknown  Guilty/hopeless: yes  Self injurious behavior/risky behavior: history of suicidal gesture, yes, status post suicide attempt  Suicidal ideation: status post suicide attempt, yes, Patient states that episode with suicidal gesture however method has potential for serious injury in death  Homicidal ideation: no  Auditory hallucinations: no  Visual hallucinations: no  Other hallucinations: no  Delusional thinking: no  Eating disorder history: no  Obsessive/compulsive symptoms: no    Historical Information       Mental Status Evaluation:    Appearance disheveled   Behavior Lethargic/sedated   Mood depressed   Speech Increased latency of response and Garbled   Affect constricted   Thought Processes Goal directed and coherent   Thought Content Does not verbalize delusional material   Associations Tightly connected   Perceptual Disturbances Cannot be assessed due to patient factors   Risk Potential Suicidal/Homicidal Ideation - status post suicide attempt  Risk of Violence - No  Risk of Self Mutilation - No   Orientation oriented to person, place and situation   Memory recent and remote memory grossly intact   Consciousness sedated Attention/Concentration attention span and concentration appear shorter than expected for age   Intellect not formally assessed   Insight poor   Judgement poor   Muscle Strength and Gait decreased muscle tone, decreased muscle strength   Motor Activity no abnormal movements   Language difficulty naming common objects, difficulty repeating a phrase, difficulty writing a sentence   Fund of Knowledge adequate knowledge of current events  adequate fund of knowledge regarding past history  adequate fund of knowledge regarding vocabulary    Pain none   Pain Scale 0       Past Psychiatric History:     Past Inpatient Psychiatric Treatment:   Past inpatient psychiatric admissions at [place] [id number] and [facility]  Past Outpatient Psychiatric Treatment:    Was in outpatient psychiatric treatment in the past with a psychiatrist  Past Suicide Attempts: yes, by overdose  Past Violent Behavior: no  Past Psychiatric Medication Trials: multiple psychiatric medication trials    Traumatic History:     Abuse: no history of physical or sexual abuse  Other Traumatic Events: none      Substance Abuse History:    Social History     Tobacco History     Smoking Status  Current Every Day Smoker Smoking Frequency  1 pack/day for 25 years (25 pk yrs) Smoking Tobacco Type  Cigarettes    Smokeless Tobacco Use  Never Used          Alcohol History     Alcohol Use Status  Yes Drinks/Week  21 Cans of beer per week Amount  21 0 standard drinks of alcohol/wk Comment  claims sober x2 months          Drug Use     Drug Use Status  No          Sexual Activity     Sexually Active  Not Currently          Activities of Daily Living    Not Asked               Additional Substance Use Detail     Questions Responses    Substance Use Assessment Substance use within the past 12 months    Alcohol Use Frequency 3 or more times/week    Cannabis frequency Past rare use    Comment: Never used on 5/25/2019 Never used ->Past rare use on 9/21/2019     Heroin Frequency Denies use in past 12 months    Alcohol Drink of Choice beer    Cannabis method Smoke    Comment: Smoke on 9/21/2019     1st Use of Alcohol 6-12    Last Use of Alcohol & Amount 11/14/19@**    Longest Abstinence from Alcohol unknown    Cocaine frequency Never used    Comment: Never used on 5/25/2019     Crack Cocaine Frequency Denies use in past 12 months    Methamphetamine Frequency Denies use in past 12 months    Narcotic Frequency Denies use in past 12 months    Benzodiazepine Frequency Denies use in past 12 months    Amphetamine frequency Denies use in past 12 months    Barbituate Frequency Denies use use in past 12 months    Inhalant frequency Never used    Comment: Never used on 5/25/2019     Hallucinogen frequency Never used    Comment: Never used on 5/25/2019     Ecstasy frequency Never used    Comment: Never used on 5/25/2019     Other drug frequency Never used    Comment: Never used on 5/25/2019     Opiate frequency Denies use in past 12 months    Last reviewed by Raiza Trevino RN on 3/2/2020        I am unable to assess the patient for substance use within the past 12 months as they are unable or unwilling to answer        Family Psychiatric History:     Psychiatric Illness:  unable to obtain  Substance Abuse:  unable to obtain  Suicide Attempts:  unable to obtain    Social History:    Education: high school diploma/GED  Learning Disabilities: none    Children: Has adult children  Living Arrangement: The patient lives in home with son  Occupational History: unable to obtain  Functioning Relationships: poor support system  Legal History: no current legal problems   History: None    Past Medical History:    History of Seizures: no  History of Head injury with loss of consciousness: no    Past Medical History:   Diagnosis Date    Alcohol abuse     Alcoholism ([facility] )     Anxiety     Bipolar disorder ([facility] )     Bowel obstruction ([facility] )     Depression     Gastritis  Head injury     Heart palpitations     Hyperlipidemia     Hypertension     Hypothyroidism     PTSD (post-traumatic stress disorder)     Seizure ([address] )     Sleep difficulties     Suicide attempt [facility])      Past Surgical History:   Procedure Laterality Date    ABDOMINAL SURGERY      APPENDECTOMY      BOWEL RESECTION      CHOLECYSTECTOMY      ESOPHAGOGASTRODUODENOSCOPY N/A 5/18/2018    Procedure: ESOPHAGOGASTRODUODENOSCOPY (EGD) with bx;  Surgeon: Dayna Mendez DO;  Location: [facility]; Service: Gastroenterology    HYSTERECTOMY      KNEE SURGERY Bilateral          Medical Review Of Systems:      General relationship problems, emotional problems and decreased functioning   Personality Unable to assess     Constitutional negative   ENT blurred vision   Cardiovascular low blood pressure and as noted in HPI   Respiratory shortness of breath and as noted in HPI   Gastrointestinal negative   Genitourinary negative   Musculoskeletal negative   Integumentary negative   Neurological negative   Endocrine negative   Other Symptoms none       Allergies: Allergies   Allergen Reactions    Latex Rash       Medications: All current active medications have been reviewed  Objective     Vital signs in last 24 hours:    Temp:  [97 2 °F (36 2 °C)-98 3 °F (36 8 °C)] 97 5 °F (36 4 °C)  HR:  [69-76] 76  Resp:  [7-26] 15  BP: ()/(35-79) 119/79    Intake/Output Summary (Last 24 hours) at 3/3/2020 1324  Last data filed at 3/3/2020 1323  Gross per 24 hour   Intake 2354 24 ml   Output 1900 ml   Net 454 24 ml         Laboratory Results:   Recent Labs (last 2 months):    Admission on 03/02/2020   Component Date Value    POC Glucose 03/02/2020 171*    WBC 03/02/2020 12 21*    RBC 03/02/2020 4 22     Hemoglobin 03/02/2020 13 5     Hematocrit 03/02/2020 40 8     MCV 03/02/2020 97     MCH 03/02/2020 32 0     MCHC 03/02/2020 33 1     RDW 03/02/2020 12 7     MPV 03/02/2020 9 5     Platelets 50/19/1479 399*  nRBC 03/02/2020 0     Neutrophils Relative 03/02/2020 67     Immat GRANS % 03/02/2020 1     Lymphocytes Relative 03/02/2020 25     Monocytes Relative 03/02/2020 7     Eosinophils Relative 03/02/2020 0     Basophils Relative 03/02/2020 0     Neutrophils Absolute 03/02/2020 8 21*    Immature Grans Absolute 03/02/2020 0 06     Lymphocytes Absolute 03/02/2020 2 99     Monocytes Absolute 03/02/2020 0 89     Eosinophils Absolute 03/02/2020 0 02     Basophils Absolute 03/02/2020 0 04     Sodium 03/02/2020 137     Potassium 03/02/2020 4 7     Chloride 03/02/2020 101     CO2 03/02/2020 25     ANION GAP 03/02/2020 11     BUN 03/02/2020 10     Creatinine 03/02/2020 1 14     Glucose 03/02/2020 189*    Calcium 03/02/2020 9 5     AST 03/02/2020 16     ALT 03/02/2020 30     Alkaline Phosphatase 03/02/2020 100     Total Protein 03/02/2020 7 2     Albumin 03/02/2020 3 7     Total Bilirubin 03/02/2020 0 28     eGFR 03/02/2020 55     Lipase 03/02/2020 181     Troponin I 03/02/2020 <0 02     Amph/Meth UR 03/03/2020 Negative     Barbiturate Ur 03/03/2020 Negative     Benzodiazepine Urine 03/03/2020 Negative     Cocaine Urine 03/03/2020 Negative     Methadone Urine 03/03/2020 Negative     Opiate Urine 03/03/2020 Negative     PCP Ur 03/03/2020 Negative     THC Urine 03/03/2020 Negative     Lithium Lvl 03/02/2020 0 3*    Ethanol Lvl 35/68/7588 <3     Salicylate Lvl 82/73/6811 5 3     Acetaminophen Level 03/02/2020 <2*    LACTIC ACID 03/02/2020 2 3*    LACTIC ACID 03/03/2020 1 4     Ventricular Rate 03/02/2020 70     Atrial Rate 03/02/2020 70     NV Interval 03/02/2020 200     QRSD Interval 03/02/2020 68     QT Interval 03/02/2020 382     QTC Interval 03/02/2020 412     P Axis 03/02/2020 63     QRS Axis 03/02/2020 52     T Wave Axis 03/02/2020 3     TSH 3RD GENERATON 03/02/2020 8 919*    POC Glucose 03/02/2020 172*    Ventricular Rate 03/02/2020 71     Atrial Rate 03/02/2020 71  MT Interval 03/02/2020 198     QRSD Interval 03/02/2020 70     QT Interval 03/02/2020 402     QTC Interval 03/02/2020 436     P Axis 03/02/2020 76     QRS Axis 03/02/2020 103     T Wave Axis 03/02/2020 50     Ventricular Rate 03/02/2020 70     Atrial Rate 03/02/2020 70     MT Interval 03/02/2020 200     QRSD Interval 03/02/2020 70     QT Interval 03/02/2020 408     QTC Interval 03/02/2020 440     P Axis 03/02/2020 64     QRS Axis 03/02/2020 63     T Wave [place] 03/02/2020 25     WBC 03/03/2020 11 26*    RBC 03/03/2020 3 83     Hemoglobin 03/03/2020 12 2     Hematocrit 03/03/2020 37 6     MCV 03/03/2020 98     MCH 03/03/2020 31 9     MCHC 03/03/2020 32 4     RDW 03/03/2020 12 8     MPV 03/03/2020 9 3     Platelets 10/08/4152 319     nRBC 03/03/2020 0     Neutrophils Relative 03/03/2020 68     Immat GRANS % 03/03/2020 1     Lymphocytes Relative 03/03/2020 23     Monocytes Relative 03/03/2020 8     Eosinophils Relative 03/03/2020 0     Basophils Relative 03/03/2020 0     Neutrophils Absolute 03/03/2020 7 66*    Immature Grans Absolute 03/03/2020 0 07     Lymphocytes Absolute 03/03/2020 2 61     Monocytes Absolute 03/03/2020 0 87     Eosinophils Absolute 03/03/2020 0 02     Basophils Absolute 03/03/2020 0 03     Sodium 03/03/2020 143     Potassium 03/03/2020 3 9     Chloride 03/03/2020 107     CO2 03/03/2020 29     ANION GAP 03/03/2020 7     BUN 03/03/2020 8     Creatinine 03/03/2020 0 89     Glucose 03/03/2020 159*    Calcium 03/03/2020 8 5     eGFR 03/03/2020 75     Ventricular Rate 03/03/2020 74     Atrial Rate 03/03/2020 74     MT Interval 03/03/2020 841     QRSD Interval 03/03/2020 88     QT Interval 03/03/2020 404     QTC Interval 03/03/2020 449     QRS Axis 03/03/2020 46     T Wave Axis 03/03/2020 41     Ventricular Rate 03/03/2020 75     Atrial Rate 03/03/2020 75     MT Interval 03/03/2020 225     QRSD Interval 03/03/2020 71     QT Interval 03/03/2020 383     QTC Interval 03/03/2020 428     P Axis 03/03/2020 30     QRS Axis 03/03/2020 63     T Wave Axis 03/03/2020 36     POC Glucose 03/03/2020 81    Admission on 02/10/2020, Discharged on 02/17/2020   Component Date Value    Sodium 02/10/2020 137     Potassium 02/10/2020 3 3*    Chloride 02/10/2020 98     CO2 02/10/2020 26     ANION GAP 02/10/2020 13     BUN 02/10/2020 11     Creatinine 02/10/2020 0 84     Glucose 02/10/2020 151*    Calcium 02/10/2020 9 6     AST 02/10/2020 77*    ALT 02/10/2020 93*    Alkaline Phosphatase 02/10/2020 116     Total Protein 02/10/2020 7 8     Albumin 02/10/2020 4 6     Total Bilirubin 02/10/2020 0 40     eGFR 02/10/2020 80     WBC 02/10/2020 11 10*    RBC 02/10/2020 4 41     Hemoglobin 02/10/2020 13 9     Hematocrit 02/10/2020 41 3     MCV 02/10/2020 94     MCH 02/10/2020 31 5     MCHC 02/10/2020 33 7     RDW 02/10/2020 14 7     MPV 02/10/2020 7 6*    Platelets 22/85/7961 290     Neutrophils Relative 02/10/2020 67*    Lymphocytes Relative 02/10/2020 24*    Monocytes Relative 02/10/2020 8     Eosinophils Relative 02/10/2020 0     Basophils Relative 02/10/2020 1     Neutrophils Absolute 02/10/2020 7 50     Lymphocytes Absolute 02/10/2020 2 60     Monocytes Absolute 02/10/2020 0 90     Eosinophils Absolute 02/10/2020 0 00     Basophils Absolute 02/10/2020 0 10     Protime 02/10/2020 9 6*    INR 02/10/2020 0 86*    PTT 02/10/2020 25     TSH 3RD GENERATON 02/10/2020 2 470     Ammonia 02/10/2020 <9*    Ethanol Lvl 02/10/2020 178*    Amph/Meth UR 02/10/2020 Negative     Barbiturate Ur 02/10/2020 Negative     Benzodiazepine Urine 02/10/2020 Negative     Cocaine Urine 02/10/2020 Negative     Methadone Urine 02/10/2020 Negative     Opiate Urine 02/10/2020 Negative     PCP Ur 02/10/2020 Negative     THC Urine 02/10/2020 Negative     Acetaminophen Level 35/31/2531 <75*    Salicylate Lvl 40/96/8253 <1 0*    EXT PREG TEST UR (Ref: N* 02/10/2020 negative     Control 02/10/2020 valid     Sodium 02/10/2020 138     Potassium 02/10/2020 4 9     Chloride 02/10/2020 101     CO2 02/10/2020 27     ANION GAP 02/10/2020 10     BUN 02/10/2020 11     Creatinine 02/10/2020 0 68     Glucose 02/10/2020 146*    Calcium 02/10/2020 9 0     eGFR 02/10/2020 101     Sodium 02/10/2020 140     Potassium 02/10/2020 4 0     Chloride 02/10/2020 106     CO2 02/10/2020 25     ANION GAP 02/10/2020 9     BUN 02/10/2020 11     Creatinine 02/10/2020 0 57*    Glucose 02/10/2020 132*    Calcium 02/10/2020 8 5     AST 02/10/2020 55*    ALT 02/10/2020 75*    Alkaline Phosphatase 02/10/2020 87     Total Protein 02/10/2020 6 8     Albumin 02/10/2020 3 8     Total Bilirubin 02/10/2020 0 30     eGFR 02/10/2020 107     Cholesterol 02/11/2020 168     Triglycerides 02/11/2020 168*    HDL, Direct 02/11/2020 58     LDL Calculated 02/11/2020 76     Non-HDL-Chol (CHOL-HDL) 02/11/2020 110     RPR 02/11/2020 Non-Reactive     Ventricular Rate 02/10/2020 94     Atrial Rate 02/10/2020 94     MI Interval 02/10/2020 240     QRSD Interval 02/10/2020 76     QT Interval 02/10/2020 370     QTC Interval 02/10/2020 462     P Axis 02/10/2020 43     QRS Axis 02/10/2020 6     T Wave Axis 02/10/2020 4     Ventricular Rate 02/11/2020 84     Atrial Rate 02/11/2020 84     MI Interval 02/11/2020 184     QRSD Interval 02/11/2020 68     QT Interval 02/11/2020 398     QTC Interval 02/11/2020 470     P Axis 02/11/2020 60     QRS Axis 02/11/2020 29     T Wave [place] 02/11/2020 21     TSH 3RD GENERATON 02/12/2020 2 540     Lithium Lvl 02/15/2020 0 3*    Ventricular Rate 02/15/2020 61     Atrial Rate 02/15/2020 61     MI Interval 02/15/2020 214     QRSD Interval 02/15/2020 66     QT Interval 02/15/2020 440     QTC Interval 02/15/2020 442     P Axis 02/15/2020 26     QRS Axis 02/15/2020 24     T Wave Axis 02/15/2020 2     Lithium Lvl 02/16/2020 0 3*  Folate 02/16/2020 11 3     Vitamin B-12 02/16/2020 618    Admission on 01/06/2020, Discharged on 01/06/2020   Component Date Value    EXTBreath Alcohol 01/06/2020 0  [address] Amph/Meth UR 01/06/2020 Negative     Barbiturate Ur 01/06/2020 Negative     Benzodiazepine Urine 01/06/2020 Negative     Cocaine Urine 01/06/2020 Negative     Methadone Urine 01/06/2020 Negative     Opiate Urine 01/06/2020 Negative     PCP Ur 01/06/2020 Negative     THC Urine 01/06/2020 Negative      I have personally reviewed all pertinent laboratory/tests results  Imaging Studies: No results found  Code Status: Level 1 - Full Code  Advance Directive and Living Will:       Power of :      Treatment Plan:     Planned Medication Changes: All current active medications have been reviewed  No medication changes at this time; patient receiving IV vasopressors    Current Facility-Administered Medications:  enoxaparin 40 mg Subcutaneous Daily Terrial Franc, CRNP    folic acid 1 mg Oral Daily Terrial Franc, CRNP    insulin lispro 1-5 Units Subcutaneous Q6H [facility] YONG Knight    lactated ringers 100 mL/hr Intravenous Continuous Terrial Franc, CRNP Last Rate: 100 mL/hr (03/03/20 0734)   multivitamin-minerals 1 tablet Oral Daily Terrial Franc, CRNP    norepinephrine 1-30 mcg/min Intravenous Titrated Terrial Franc, CRNP Last Rate: Stopped (03/03/20 0915)   thiamine 100 mg Oral Daily Terrial Franc, CRNP        Risks / Benefits of Treatment:    Discussed risks and benefits of treatment with patient including :Does not apply     Counseling / Coordination of Care:    Diagnosis, medication changes and treatment plan reviewed with patient  YONG Morrison 03/03/20    Code Status: Level 1 - Full Code      Portions of the record may have been created with voice recognition software  Occasional wrong word or "sound a like" substitutions may have occurred due to the inherent limitations of voice recognition software   Read the chart carefully and recognize, using context, where substitutions have occurred

## 2020-03-03 NOTE — PLAN OF CARE
Problem: Potential for Falls  Goal: Patient will remain free of falls  Description  INTERVENTIONS:  - Assess patient frequently for physical needs  -  Identify cognitive and physical deficits and behaviors that affect risk of falls  -  [place] fall precautions as indicated by assessment   - Educate patient/family on patient safety including physical limitations  - Instruct patient to call for assistance with activity based on assessment  - Modify environment to reduce risk of injury  - Consider OT/PT consult to assist with strengthening/mobility  Outcome: Progressing     Problem: SAFETY ADULT  Goal: Patient will remain free of falls  Description  INTERVENTIONS:  - Assess patient frequently for physical needs  -  Identify cognitive and physical deficits and behaviors that affect risk of falls    -  [place] fall precautions as indicated by assessment   - Educate patient/family on patient safety including physical limitations  - Instruct patient to call for assistance with activity based on assessment  - Modify environment to reduce risk of injury  - Consider OT/PT consult to assist with strengthening/mobility  Outcome: Progressing  Goal: Maintain or return to baseline ADL function  Description  INTERVENTIONS:  -  Assess patient's ability to carry out ADLs; assess patient's baseline for ADL function and identify physical deficits which impact ability to perform ADLs (bathing, care of mouth/teeth, toileting, grooming, dressing, etc )  - Assess/evaluate cause of self-care deficits   - Assess range of motion  - Assess patient's mobility; develop plan if impaired  - Assess patient's need for assistive devices and provide as appropriate  - Encourage maximum independence but intervene and supervise when necessary  - Involve family in performance of ADLs  - Assess for home care needs following discharge   - Consider OT consult to assist with ADL evaluation and planning for discharge  - Provide patient education as appropriate  Outcome: Progressing  Goal: Maintain or return mobility status to optimal level  Description  INTERVENTIONS:  - Assess patient's baseline mobility status (ambulation, transfers, stairs, etc )    - Identify cognitive and physical deficits and behaviors that affect mobility  - Identify mobility aids required to assist with transfers and/or ambulation (gait belt, sit-to-stand, lift, walker, cane, etc )  - [place] fall precautions as indicated by assessment  - Record patient progress and toleration of activity level on Mobility SBAR; progress patient to next Phase/Stage  - Instruct patient to call for assistance with activity based on assessment  - Consider rehabilitation consult to assist with strengthening/weightbearing, etc   Outcome: Progressing     Problem: SELF HARM  Goal: Effect of psychiatric condition will be minimized and patient will be protected from self harm  Description  INTERVENTIONS:  - Assess impact of patient's symptoms on level of functioning, self-care needs and offer support as indicated  - Assess patient/family knowledge of depression, impact on illness and need for teaching  - Provide emotional support, presence and reassurance  - Assess for possible suicidal thoughts, ideation or self-harm  If patient expresses suicidal thoughts or statements do not leave alone, notify physician/AP immediately, initiate suicide precautions, and determine need for continual observation    - initiate consults and referrals as appropriate (a mental health professional, Spiritual Care  Outcome: Progressing     Problem: SUBSTANCE USE/ABUSE  Goal: Will have no detox symptoms and will verbalize plan for changing substance-related behavior  Description  INTERVENTIONS:  - Monitor physical status and assess for symptoms of withdrawal  - Administer medication as ordered  - Provide emotional support with 1 on 1 interaction with staff  - Encourage recovery focused program/ addiction education  - Assess for verbalization of changing behaviors related to substance abuse  - Initiate consults and referrals as appropriate (Case Management, Spiritual Care, etc )  3/3/2020 1423 by Adeline Tesfaye RN  Outcome: Progressing  3/3/2020 1422 by Adeline Tesfaye RN  Outcome: Progressing  Goal: By discharge, will develop insight into their chemical dependency and sustain motivation to continue in recovery  Description  INTERVENTIONS:  - Attends all daily group sessions and scheduled AA groups  - Actively practices coping skills through participation in the therapeutic community and adherence to program rules  - Reviews and completes assignments from individual treatment plan  - Assist patient development of understanding of their personal cycle of addiction and relapse triggers  Outcome: Progressing  Goal: By discharge, patient will have ongoing treatment plan addressing chemical dependency  Description  INTERVENTIONS:  - Assist patient with resources and/or appointments for ongoing recovery based living  Outcome: Progressing

## 2020-03-03 NOTE — ASSESSMENT & PLAN NOTE
Patient reports she took her entire bottle of metoprolol and all of the other meds in prescription bottles that were "there"  She is unable to name other meds and unable to quantify  She states she took them around 1-1:30pm today    Other outpatient meds she is on are sucralfate, thiamine, amlodipine, clonidine, doxepin, folic acid, gabapentin, hydroxyzine, lithium, loratadine, losartan, lurasidone,pantoprazole  EKG shows NSR with no QTc elongation  Patient was given Na bicarb, glucagon, 2L IVF  Will await consult from Toxicology  Will admit to ICU SD 1 to monitor hemodynamics closely with supportive care  Will order psych consult when patient more awake

## 2020-03-03 NOTE — ASSESSMENT & PLAN NOTE
·  Drug screen is negative for illicit agents  · Not sure which drugs she may have ingested, possibly amlodipine, clonidine and metoprolol  · Will monitor closely for signs of respiratory depression, hemodynamic instability  · Will continue vasopressor therapy to maintain MAP > 65

## 2020-03-03 NOTE — PLAN OF CARE
Problem: Potential for Falls  Goal: Patient will remain free of falls  Description  INTERVENTIONS:  - Assess patient frequently for physical needs  -  Identify cognitive and physical deficits and behaviors that affect risk of falls  -  [place] fall precautions as indicated by assessment   - Educate patient/family on patient safety including physical limitations  - Instruct patient to call for assistance with activity based on assessment  - Modify environment to reduce risk of injury  - Consider OT/PT consult to assist with strengthening/mobility  Outcome: Progressing     Problem: SAFETY ADULT  Goal: Patient will remain free of falls  Description  INTERVENTIONS:  - Assess patient frequently for physical needs  -  Identify cognitive and physical deficits and behaviors that affect risk of falls    -  [place] fall precautions as indicated by assessment   - Educate patient/family on patient safety including physical limitations  - Instruct patient to call for assistance with activity based on assessment  - Modify environment to reduce risk of injury  - Consider OT/PT consult to assist with strengthening/mobility  Outcome: Progressing  Goal: Maintain or return to baseline ADL function  Description  INTERVENTIONS:  -  Assess patient's ability to carry out ADLs; assess patient's baseline for ADL function and identify physical deficits which impact ability to perform ADLs (bathing, care of mouth/teeth, toileting, grooming, dressing, etc )  - Assess/evaluate cause of self-care deficits   - Assess range of motion  - Assess patient's mobility; develop plan if impaired  - Assess patient's need for assistive devices and provide as appropriate  - Encourage maximum independence but intervene and supervise when necessary  - Involve family in performance of ADLs  - Assess for home care needs following discharge   - Consider OT consult to assist with ADL evaluation and planning for discharge  - Provide patient education as appropriate  Outcome: Progressing  Goal: Maintain or return mobility status to optimal level  Description  INTERVENTIONS:  - Assess patient's baseline mobility status (ambulation, transfers, stairs, etc )    - Identify cognitive and physical deficits and behaviors that affect mobility  - Identify mobility aids required to assist with transfers and/or ambulation (gait belt, sit-to-stand, lift, walker, cane, etc )  - [place] fall precautions as indicated by assessment  - Record patient progress and toleration of activity level on Mobility SBAR; progress patient to next Phase/Stage  - Instruct patient to call for assistance with activity based on assessment  - Consider rehabilitation consult to assist with strengthening/weightbearing, etc   Outcome: Progressing     Problem: SELF HARM  Goal: Effect of psychiatric condition will be minimized and patient will be protected from self harm  Description  INTERVENTIONS:  - Assess impact of patient's symptoms on level of functioning, self-care needs and offer support as indicated  - Assess patient/family knowledge of depression, impact on illness and need for teaching  - Provide emotional support, presence and reassurance  - Assess for possible suicidal thoughts, ideation or self-harm  If patient expresses suicidal thoughts or statements do not leave alone, notify physician/AP immediately, initiate suicide precautions, and determine need for continual observation    - initiate consults and referrals as appropriate (a mental health professional, Spiritual Care  Outcome: Progressing

## 2020-03-03 NOTE — PLAN OF CARE
Problem: Potential for Falls  Goal: Patient will remain free of falls  Description  INTERVENTIONS:  - Assess patient frequently for physical needs  -  Identify cognitive and physical deficits and behaviors that affect risk of falls  -  [place] fall precautions as indicated by assessment   - Educate patient/family on patient safety including physical limitations  - Instruct patient to call for assistance with activity based on assessment  - Modify environment to reduce risk of injury  - Consider OT/PT consult to assist with strengthening/mobility  Outcome: Progressing     Problem: SAFETY ADULT  Goal: Patient will remain free of falls  Description  INTERVENTIONS:  - Assess patient frequently for physical needs  -  Identify cognitive and physical deficits and behaviors that affect risk of falls    -  [place] fall precautions as indicated by assessment   - Educate patient/family on patient safety including physical limitations  - Instruct patient to call for assistance with activity based on assessment  - Modify environment to reduce risk of injury  - Consider OT/PT consult to assist with strengthening/mobility  Outcome: Progressing  Goal: Maintain or return to baseline ADL function  Description  INTERVENTIONS:  -  Assess patient's ability to carry out ADLs; assess patient's baseline for ADL function and identify physical deficits which impact ability to perform ADLs (bathing, care of mouth/teeth, toileting, grooming, dressing, etc )  - Assess/evaluate cause of self-care deficits   - Assess range of motion  - Assess patient's mobility; develop plan if impaired  - Assess patient's need for assistive devices and provide as appropriate  - Encourage maximum independence but intervene and supervise when necessary  - Involve family in performance of ADLs  - Assess for home care needs following discharge   - Consider OT consult to assist with ADL evaluation and planning for discharge  - Provide patient education as appropriate  Outcome: Progressing  Goal: Maintain or return mobility status to optimal level  Description  INTERVENTIONS:  - Assess patient's baseline mobility status (ambulation, transfers, stairs, etc )    - Identify cognitive and physical deficits and behaviors that affect mobility  - Identify mobility aids required to assist with transfers and/or ambulation (gait belt, sit-to-stand, lift, walker, cane, etc )  - [place] fall precautions as indicated by assessment  - Record patient progress and toleration of activity level on Mobility SBAR; progress patient to next Phase/Stage  - Instruct patient to call for assistance with activity based on assessment  - Consider rehabilitation consult to assist with strengthening/weightbearing, etc   Outcome: Progressing     Problem: SELF HARM  Goal: Effect of psychiatric condition will be minimized and patient will be protected from self harm  Description  INTERVENTIONS:  - Assess impact of patient's symptoms on level of functioning, self-care needs and offer support as indicated  - Assess patient/family knowledge of depression, impact on illness and need for teaching  - Provide emotional support, presence and reassurance  - Assess for possible suicidal thoughts, ideation or self-harm  If patient expresses suicidal thoughts or statements do not leave alone, notify physician/AP immediately, initiate suicide precautions, and determine need for continual observation    - initiate consults and referrals as appropriate (a mental health professional, Spiritual Care  Outcome: Progressing

## 2020-03-03 NOTE — ASSESSMENT & PLAN NOTE
· Hypotensive on admission, started on levophed, given calcium with improvement of BP  · Off of levophed since 3/3 9am  · Antihypertensives on hold until BP remains normal

## 2020-03-03 NOTE — ASSESSMENT & PLAN NOTE
· Patient reports she took her entire bottle of metoprolol and all of the other meds in prescription bottles that were "there"  She is unable to name other meds and unable to quantify   Other outpatient meds she is on are sucralfate, thiamine, amlodipine, clonidine, doxepin, folic acid, gabapentin, hydroxyzine, lithium, loratadine, losartan, lurasidone,pantoprazole  · Toxicology consulted  · Psych consulted

## 2020-03-03 NOTE — ASSESSMENT & PLAN NOTE
· Unclear history of siezures  · Seizure precautions  · Meds on hold due to possibility of overdose  · Will treat if ativan if seizure observed

## 2020-03-04 LAB
ATRIAL RATE: 82 BPM
ATRIAL RATE: 95 BPM
GLUCOSE SERPL-MCNC: 125 MG/DL (ref 65–140)
GLUCOSE SERPL-MCNC: 127 MG/DL (ref 65–140)
GLUCOSE SERPL-MCNC: 135 MG/DL (ref 65–140)
GLUCOSE SERPL-MCNC: 149 MG/DL (ref 65–140)
P AXIS: 45 DEGREES
P AXIS: 82 DEGREES
PR INTERVAL: 196 MS
PR INTERVAL: 204 MS
QRS AXIS: 48 DEGREES
QRS AXIS: 75 DEGREES
QRSD INTERVAL: 71 MS
QRSD INTERVAL: 75 MS
QT INTERVAL: 375 MS
QT INTERVAL: 413 MS
QTC INTERVAL: 472 MS
QTC INTERVAL: 483 MS
T WAVE AXIS: 53 DEGREES
T WAVE AXIS: 67 DEGREES
VENTRICULAR RATE: 82 BPM
VENTRICULAR RATE: 95 BPM

## 2020-03-04 PROCEDURE — 93005 ELECTROCARDIOGRAM TRACING: CPT

## 2020-03-04 PROCEDURE — 93010 ELECTROCARDIOGRAM REPORT: CPT | Performed by: INTERNAL MEDICINE

## 2020-03-04 PROCEDURE — NC001 PR NO CHARGE: Performed by: NURSE PRACTITIONER

## 2020-03-04 PROCEDURE — 82948 REAGENT STRIP/BLOOD GLUCOSE: CPT

## 2020-03-04 PROCEDURE — 99232 SBSQ HOSP IP/OBS MODERATE 35: CPT | Performed by: INTERNAL MEDICINE

## 2020-03-04 RX ORDER — FOLIC ACID 1 MG/1
1 TABLET ORAL DAILY
Status: DISCONTINUED | OUTPATIENT
Start: 2020-03-05 | End: 2020-03-05 | Stop reason: HOSPADM

## 2020-03-04 RX ORDER — LOSARTAN POTASSIUM 50 MG/1
100 TABLET ORAL DAILY
Status: DISCONTINUED | OUTPATIENT
Start: 2020-03-04 | End: 2020-03-05 | Stop reason: HOSPADM

## 2020-03-04 RX ORDER — GABAPENTIN 400 MG/1
400 CAPSULE ORAL 3 TIMES DAILY
Status: DISCONTINUED | OUTPATIENT
Start: 2020-03-04 | End: 2020-03-05 | Stop reason: HOSPADM

## 2020-03-04 RX ORDER — DIPHENHYDRAMINE HCL 25 MG
25 TABLET ORAL EVERY 6 HOURS PRN
Status: DISCONTINUED | OUTPATIENT
Start: 2020-03-04 | End: 2020-03-05 | Stop reason: HOSPADM

## 2020-03-04 RX ORDER — THIAMINE MONONITRATE (VIT B1) 100 MG
100 TABLET ORAL DAILY
Status: DISCONTINUED | OUTPATIENT
Start: 2020-03-05 | End: 2020-03-04

## 2020-03-04 RX ORDER — SUCRALFATE 1 G/1
1 TABLET ORAL
Status: DISCONTINUED | OUTPATIENT
Start: 2020-03-04 | End: 2020-03-05 | Stop reason: HOSPADM

## 2020-03-04 RX ORDER — LITHIUM CARBONATE 300 MG/1
300 CAPSULE ORAL EVERY MORNING
Status: DISCONTINUED | OUTPATIENT
Start: 2020-03-04 | End: 2020-03-05 | Stop reason: HOSPADM

## 2020-03-04 RX ORDER — ACETAMINOPHEN 325 MG/1
650 TABLET ORAL EVERY 6 HOURS PRN
Status: DISCONTINUED | OUTPATIENT
Start: 2020-03-04 | End: 2020-03-05 | Stop reason: HOSPADM

## 2020-03-04 RX ORDER — PANTOPRAZOLE SODIUM 40 MG/1
40 TABLET, DELAYED RELEASE ORAL
Status: DISCONTINUED | OUTPATIENT
Start: 2020-03-04 | End: 2020-03-05 | Stop reason: HOSPADM

## 2020-03-04 RX ORDER — AMLODIPINE BESYLATE 5 MG/1
5 TABLET ORAL 2 TIMES DAILY
Status: DISCONTINUED | OUTPATIENT
Start: 2020-03-04 | End: 2020-03-05 | Stop reason: HOSPADM

## 2020-03-04 RX ORDER — CLONIDINE HYDROCHLORIDE 0.1 MG/1
0.1 TABLET ORAL 2 TIMES DAILY
Status: DISCONTINUED | OUTPATIENT
Start: 2020-03-04 | End: 2020-03-05 | Stop reason: HOSPADM

## 2020-03-04 RX ORDER — GABAPENTIN 300 MG/1
600 CAPSULE ORAL
Status: DISCONTINUED | OUTPATIENT
Start: 2020-03-04 | End: 2020-03-04

## 2020-03-04 RX ADMIN — ENOXAPARIN SODIUM 40 MG: 40 INJECTION SUBCUTANEOUS at 10:04

## 2020-03-04 RX ADMIN — LURASIDONE HYDROCHLORIDE 80 MG: 40 TABLET, FILM COATED ORAL at 17:35

## 2020-03-04 RX ADMIN — LITHIUM CARBONATE 300 MG: 300 CAPSULE, GELATIN COATED ORAL at 15:51

## 2020-03-04 RX ADMIN — AMLODIPINE BESYLATE 5 MG: 5 TABLET ORAL at 17:35

## 2020-03-04 RX ADMIN — GABAPENTIN 400 MG: 400 CAPSULE ORAL at 17:35

## 2020-03-04 RX ADMIN — AMLODIPINE BESYLATE 5 MG: 5 TABLET ORAL at 15:50

## 2020-03-04 RX ADMIN — DIPHENHYDRAMINE HCL 25 MG: 25 TABLET ORAL at 18:34

## 2020-03-04 RX ADMIN — LITHIUM CARBONATE 450 MG: 300 CAPSULE, GELATIN COATED ORAL at 21:23

## 2020-03-04 RX ADMIN — SODIUM CHLORIDE, SODIUM LACTATE, POTASSIUM CHLORIDE, AND CALCIUM CHLORIDE 100 ML/HR: .6; .31; .03; .02 INJECTION, SOLUTION INTRAVENOUS at 06:42

## 2020-03-04 RX ADMIN — CLONIDINE HYDROCHLORIDE 0.1 MG: 0.1 TABLET ORAL at 17:35

## 2020-03-04 RX ADMIN — SUCRALFATE 1 G: 1 TABLET ORAL at 17:35

## 2020-03-04 RX ADMIN — Medication 1 TABLET: at 10:02

## 2020-03-04 RX ADMIN — SUCRALFATE 1 G: 1 TABLET ORAL at 21:23

## 2020-03-04 RX ADMIN — GABAPENTIN 400 MG: 400 CAPSULE ORAL at 21:23

## 2020-03-04 RX ADMIN — FOLIC ACID 1 MG: 1 TABLET ORAL at 10:02

## 2020-03-04 RX ADMIN — THIAMINE HCL TAB 100 MG 100 MG: 100 TAB at 10:02

## 2020-03-04 RX ADMIN — CLONIDINE HYDROCHLORIDE 0.1 MG: 0.1 TABLET ORAL at 15:50

## 2020-03-04 RX ADMIN — LOSARTAN POTASSIUM 100 MG: 50 TABLET, FILM COATED ORAL at 15:50

## 2020-03-04 RX ADMIN — PANTOPRAZOLE SODIUM 40 MG: 40 TABLET, DELAYED RELEASE ORAL at 15:50

## 2020-03-04 NOTE — SOCIAL WORK
Pt seen by Psychiatry with pt signing a 201 for [facility] once medically cleared  Original placed in pt's folder  Will continue to follow to assist with dc poc

## 2020-03-04 NOTE — PLAN OF CARE
Problem: Potential for Falls  Goal: Patient will remain free of falls  Description  INTERVENTIONS:  - Assess patient frequently for physical needs  -  Identify cognitive and physical deficits and behaviors that affect risk of falls  -  [place] fall precautions as indicated by assessment   - Educate patient/family on patient safety including physical limitations  - Instruct patient to call for assistance with activity based on assessment  - Modify environment to reduce risk of injury  - Consider OT/PT consult to assist with strengthening/mobility  Outcome: Progressing     Problem: SAFETY ADULT  Goal: Patient will remain free of falls  Description  INTERVENTIONS:  - Assess patient frequently for physical needs  -  Identify cognitive and physical deficits and behaviors that affect risk of falls    -  [place] fall precautions as indicated by assessment   - Educate patient/family on patient safety including physical limitations  - Instruct patient to call for assistance with activity based on assessment  - Modify environment to reduce risk of injury  - Consider OT/PT consult to assist with strengthening/mobility  Outcome: Progressing  Goal: Maintain or return to baseline ADL function  Description  INTERVENTIONS:  -  Assess patient's ability to carry out ADLs; assess patient's baseline for ADL function and identify physical deficits which impact ability to perform ADLs (bathing, care of mouth/teeth, toileting, grooming, dressing, etc )  - Assess/evaluate cause of self-care deficits   - Assess range of motion  - Assess patient's mobility; develop plan if impaired  - Assess patient's need for assistive devices and provide as appropriate  - Encourage maximum independence but intervene and supervise when necessary  - Involve family in performance of ADLs  - Assess for home care needs following discharge   - Consider OT consult to assist with ADL evaluation and planning for discharge  - Provide patient education as appropriate  Outcome: Progressing  Goal: Maintain or return mobility status to optimal level  Description  INTERVENTIONS:  - Assess patient's baseline mobility status (ambulation, transfers, stairs, etc )    - Identify cognitive and physical deficits and behaviors that affect mobility  - Identify mobility aids required to assist with transfers and/or ambulation (gait belt, sit-to-stand, lift, walker, cane, etc )  - [place] fall precautions as indicated by assessment  - Record patient progress and toleration of activity level on Mobility SBAR; progress patient to next Phase/Stage  - Instruct patient to call for assistance with activity based on assessment  - Consider rehabilitation consult to assist with strengthening/weightbearing, etc   Outcome: Progressing     Problem: SELF HARM  Goal: Effect of psychiatric condition will be minimized and patient will be protected from self harm  Description  INTERVENTIONS:  - Assess impact of patient's symptoms on level of functioning, self-care needs and offer support as indicated  - Assess patient/family knowledge of depression, impact on illness and need for teaching  - Provide emotional support, presence and reassurance  - Assess for possible suicidal thoughts, ideation or self-harm  If patient expresses suicidal thoughts or statements do not leave alone, notify physician/AP immediately, initiate suicide precautions, and determine need for continual observation    - initiate consults and referrals as appropriate (a mental health professional, Spiritual Care  Outcome: Not Progressing     Problem: SUBSTANCE USE/ABUSE  Goal: Will have no detox symptoms and will verbalize plan for changing substance-related behavior  Description  INTERVENTIONS:  - Monitor physical status and assess for symptoms of withdrawal  - Administer medication as ordered  - Provide emotional support with 1 on 1 interaction with staff  - Encourage recovery focused program/ addiction education  - Assess for verbalization of changing behaviors related to substance abuse  - Initiate consults and referrals as appropriate (Case Management, Spiritual Care, etc )  Outcome: Progressing  Goal: By discharge, will develop insight into their chemical dependency and sustain motivation to continue in recovery  Description  INTERVENTIONS:  - Attends all daily group sessions and scheduled AA groups  - Actively practices coping skills through participation in the therapeutic community and adherence to program rules  - Reviews and completes assignments from individual treatment plan  - Assist patient development of understanding of their personal cycle of addiction and relapse triggers  Outcome: Progressing  Goal: By discharge, patient will have ongoing treatment plan addressing chemical dependency  Description  INTERVENTIONS:  - Assist patient with resources and/or appointments for ongoing recovery based living  Outcome: Progressing     Problem: Prexisting or High Potential for Compromised Skin Integrity  Goal: Skin integrity is maintained or improved  Description  INTERVENTIONS:  - Identify patients at risk for skin breakdown  - Assess and monitor skin integrity  - Assess and monitor nutrition and hydration status  - Monitor labs   - Assess for incontinence   - Turn and reposition patient  - Assist with mobility/ambulation  - Relieve pressure over bony prominences  - Avoid friction and shearing  - Provide appropriate hygiene as needed including keeping skin clean and dry  - Evaluate need for skin moisturizer/barrier cream  - Collaborate with interdisciplinary team   - Patient/family teaching  - Consider wound care consult   Outcome: Progressing

## 2020-03-05 VITALS
BODY MASS INDEX: 35.94 KG/M2 | WEIGHT: 202.82 LBS | DIASTOLIC BLOOD PRESSURE: 73 MMHG | HEART RATE: 101 BPM | SYSTOLIC BLOOD PRESSURE: 106 MMHG | OXYGEN SATURATION: 99 % | RESPIRATION RATE: 18 BRPM | TEMPERATURE: 98.2 F | HEIGHT: 63 IN

## 2020-03-05 PROBLEM — T50.912A DRUG OVERDOSE, MULTIPLE DRUGS, INTENTIONAL SELF-HARM, INITIAL ENCOUNTER (HCC): Status: RESOLVED | Noted: 2020-03-02 | Resolved: 2020-03-05

## 2020-03-05 PROBLEM — E87.20 LACTIC ACIDOSIS: Status: RESOLVED | Noted: 2019-12-23 | Resolved: 2020-03-05

## 2020-03-05 PROBLEM — I95.9 HYPOTENSION: Status: RESOLVED | Noted: 2020-03-02 | Resolved: 2020-03-05

## 2020-03-05 PROBLEM — E87.2 LACTIC ACIDOSIS: Status: RESOLVED | Noted: 2019-12-23 | Resolved: 2020-03-05

## 2020-03-05 PROCEDURE — 99239 HOSP IP/OBS DSCHRG MGMT >30: CPT | Performed by: INTERNAL MEDICINE

## 2020-03-05 RX ORDER — NICOTINE 21 MG/24HR
1 PATCH, TRANSDERMAL 24 HOURS TRANSDERMAL DAILY
Qty: 28 PATCH | Refills: 0
Start: 2020-03-06 | End: 2020-03-19 | Stop reason: HOSPADM

## 2020-03-05 RX ORDER — NICOTINE 21 MG/24HR
21 PATCH, TRANSDERMAL 24 HOURS TRANSDERMAL DAILY
Status: DISCONTINUED | OUTPATIENT
Start: 2020-03-05 | End: 2020-03-05 | Stop reason: HOSPADM

## 2020-03-05 RX ORDER — AMLODIPINE BESYLATE 5 MG/1
5 TABLET ORAL DAILY
Qty: 60 TABLET | Refills: 0 | Status: ON HOLD | OUTPATIENT
Start: 2020-03-05 | End: 2020-03-19 | Stop reason: SDUPTHER

## 2020-03-05 RX ADMIN — SUCRALFATE 1 G: 1 TABLET ORAL at 06:58

## 2020-03-05 RX ADMIN — PANTOPRAZOLE SODIUM 40 MG: 40 TABLET, DELAYED RELEASE ORAL at 06:58

## 2020-03-05 RX ADMIN — GABAPENTIN 400 MG: 400 CAPSULE ORAL at 16:00

## 2020-03-05 RX ADMIN — AMLODIPINE BESYLATE 5 MG: 5 TABLET ORAL at 18:25

## 2020-03-05 RX ADMIN — Medication 1 TABLET: at 08:39

## 2020-03-05 RX ADMIN — LURASIDONE HYDROCHLORIDE 80 MG: 40 TABLET, FILM COATED ORAL at 16:30

## 2020-03-05 RX ADMIN — FOLIC ACID 1 MG: 1 TABLET ORAL at 08:39

## 2020-03-05 RX ADMIN — CLONIDINE HYDROCHLORIDE 0.1 MG: 0.1 TABLET ORAL at 18:26

## 2020-03-05 RX ADMIN — METOPROLOL TARTRATE 12.5 MG: 25 TABLET ORAL at 09:57

## 2020-03-05 RX ADMIN — NICOTINE 21 MG: 21 PATCH TRANSDERMAL at 13:31

## 2020-03-05 RX ADMIN — GABAPENTIN 400 MG: 400 CAPSULE ORAL at 08:39

## 2020-03-05 RX ADMIN — AMLODIPINE BESYLATE 5 MG: 5 TABLET ORAL at 08:38

## 2020-03-05 RX ADMIN — FOLIC ACID 1 MG: 1 TABLET ORAL at 08:40

## 2020-03-05 RX ADMIN — LITHIUM CARBONATE 300 MG: 300 CAPSULE, GELATIN COATED ORAL at 08:40

## 2020-03-05 RX ADMIN — SUCRALFATE 1 G: 1 TABLET ORAL at 12:29

## 2020-03-05 RX ADMIN — LOSARTAN POTASSIUM 100 MG: 50 TABLET, FILM COATED ORAL at 08:39

## 2020-03-05 RX ADMIN — ENOXAPARIN SODIUM 40 MG: 40 INJECTION SUBCUTANEOUS at 08:39

## 2020-03-05 RX ADMIN — SUCRALFATE 1 G: 1 TABLET ORAL at 16:30

## 2020-03-05 RX ADMIN — CLONIDINE HYDROCHLORIDE 0.1 MG: 0.1 TABLET ORAL at 08:38

## 2020-03-05 RX ADMIN — THIAMINE HCL TAB 100 MG 100 MG: 100 TAB at 08:39

## 2020-03-05 NOTE — PROGRESS NOTES
Progress Note - Behavioral Health   Mitchell Jacobs 48 y o  female MRN: [id number]  Unit/Bed#: [facility] [address] 226-01 Encounter: [id number]    The patient was seen for continuing care and reviewed with treatment team     Mental Status Evaluation:    Appearance Marginal/poor hygiene   Behavior cooperative   Mood anxious and depressed   Speech Normal rate and volume   Affect constricted   Thought Processes Goal directed and coherent   Thought Content Does not verbalize delusional material   Perceptual Disturbances Denies hallucinations and does not appear to be responding to internal stimuli   Risk Potential Suicidal/Homicidal Ideation - Status post suicide attempt  Risk of Violence - No  Risk of Self Mutilation - Status post suicide attempt   Sensorium oriented to person, place, time/date and situation   Cognition/Memory recent and remote memory grossly intact   Consciousness alert and awake   Attention/Concentration attention span and concentration are age appropriate   Insight poor   Judgement poor   Muscle Strength and Gait/Station normal muscle strength and normal muscle tone, normal gait/station and normal balance   Motor Activity no abnormal movements       Progress Toward Goals: Patient awake and alert on assessment  Patient able to reason and understand instructions  Patient confirms details outlined in consultation note  States willing to sign in 201  Recommend admission to inpatient psychiatry status post suicide attempt      Recommended Treatment: Continue with pharmacotherapy   The patient will be maintained on the following medications:    Current Facility-Administered Medications:  acetaminophen 650 mg Oral Q6H PRN Gera Chiu, DO   amLODIPine 5 mg Oral BID Gera Chiu, DO   cloNIDine 0 1 mg Oral BID Gera Chiu, DO   diphenhydrAMINE 25 mg Oral Q6H PRN Gera Chiu, DO   enoxaparin 40 mg Subcutaneous Daily Gera Chiu, DO   folic acid 1 mg Oral Daily Gera Chiu, DO   folic acid 1 mg Oral Daily Gera SWEENEY Apple, DO   gabapentin 400 mg Oral TID Gera Chiu, DO   lithium carbonate 300 mg Oral QAM Gera Chiu, DO   lithium carbonate 450 mg Oral HS Gera Chiu, DO   losartan 100 mg Oral Daily Gera Chiu, DO   lurasidone 80 mg Oral Daily With Dinner Gera Chiu, DO   metoprolol tartrate 12 5 mg Oral Q12H [address] Shefali Ambnaz, DO   multivitamin-minerals 1 tablet Oral Daily Gera Chiu, DO   nicotine 21 mg Transdermal Daily Shefali Bragg, DO   pantoprazole 40 mg Oral Early Morning Gera Chiu, DO   sucralfate 1 g Oral 4x Daily (AC & HS) Gera Chiu, DO   thiamine 100 mg Oral Daily Gera Chiu, DO       Drug overdose, multiple drugs, intentional self-harm, initial encounter

## 2020-03-05 NOTE — PLAN OF CARE
Problem: Potential for Falls  Goal: Patient will remain free of falls  Description  INTERVENTIONS:  - Assess patient frequently for physical needs  -  Identify cognitive and physical deficits and behaviors that affect risk of falls  -  [place] fall precautions as indicated by assessment   - Educate patient/family on patient safety including physical limitations  - Instruct patient to call for assistance with activity based on assessment  - Modify environment to reduce risk of injury  - Consider OT/PT consult to assist with strengthening/mobility  Outcome: Progressing     Problem: SAFETY ADULT  Goal: Patient will remain free of falls  Description  INTERVENTIONS:  - Assess patient frequently for physical needs  -  Identify cognitive and physical deficits and behaviors that affect risk of falls    -  [place] fall precautions as indicated by assessment   - Educate patient/family on patient safety including physical limitations  - Instruct patient to call for assistance with activity based on assessment  - Modify environment to reduce risk of injury  - Consider OT/PT consult to assist with strengthening/mobility  Outcome: Progressing  Goal: Maintain or return to baseline ADL function  Description  INTERVENTIONS:  -  Assess patient's ability to carry out ADLs; assess patient's baseline for ADL function and identify physical deficits which impact ability to perform ADLs (bathing, care of mouth/teeth, toileting, grooming, dressing, etc )  - Assess/evaluate cause of self-care deficits   - Assess range of motion  - Assess patient's mobility; develop plan if impaired  - Assess patient's need for assistive devices and provide as appropriate  - Encourage maximum independence but intervene and supervise when necessary  - Involve family in performance of ADLs  - Assess for home care needs following discharge   - Consider OT consult to assist with ADL evaluation and planning for discharge  - Provide patient education as appropriate  Outcome: Progressing  Goal: Maintain or return mobility status to optimal level  Description  INTERVENTIONS:  - Assess patient's baseline mobility status (ambulation, transfers, stairs, etc )    - Identify cognitive and physical deficits and behaviors that affect mobility  - Identify mobility aids required to assist with transfers and/or ambulation (gait belt, sit-to-stand, lift, walker, cane, etc )  - [place] fall precautions as indicated by assessment  - Record patient progress and toleration of activity level on Mobility SBAR; progress patient to next Phase/Stage  - Instruct patient to call for assistance with activity based on assessment  - Consider rehabilitation consult to assist with strengthening/weightbearing, etc   Outcome: Progressing     Problem: SELF HARM  Goal: Effect of psychiatric condition will be minimized and patient will be protected from self harm  Description  INTERVENTIONS:  - Assess impact of patient's symptoms on level of functioning, self-care needs and offer support as indicated  - Assess patient/family knowledge of depression, impact on illness and need for teaching  - Provide emotional support, presence and reassurance  - Assess for possible suicidal thoughts, ideation or self-harm  If patient expresses suicidal thoughts or statements do not leave alone, notify physician/AP immediately, initiate suicide precautions, and determine need for continual observation    - initiate consults and referrals as appropriate (a mental health professional, Spiritual Care  Outcome: Progressing     Problem: SUBSTANCE USE/ABUSE  Goal: Will have no detox symptoms and will verbalize plan for changing substance-related behavior  Description  INTERVENTIONS:  - Monitor physical status and assess for symptoms of withdrawal  - Administer medication as ordered  - Provide emotional support with 1 on 1 interaction with staff  - Encourage recovery focused program/ addiction education  - Assess for verbalization of changing behaviors related to substance abuse  - Initiate consults and referrals as appropriate (Case Management, Spiritual Care, etc )  Outcome: Progressing  Goal: By discharge, will develop insight into their chemical dependency and sustain motivation to continue in recovery  Description  INTERVENTIONS:  - Attends all daily group sessions and scheduled AA groups  - Actively practices coping skills through participation in the therapeutic community and adherence to program rules  - Reviews and completes assignments from individual treatment plan  - Assist patient development of understanding of their personal cycle of addiction and relapse triggers  Outcome: Progressing  Goal: By discharge, patient will have ongoing treatment plan addressing chemical dependency  Description  INTERVENTIONS:  - Assist patient with resources and/or appointments for ongoing recovery based living  Outcome: Progressing     Problem: Prexisting or High Potential for Compromised Skin Integrity  Goal: Skin integrity is maintained or improved  Description  INTERVENTIONS:  - Identify patients at risk for skin breakdown  - Assess and monitor skin integrity  - Assess and monitor nutrition and hydration status  - Monitor labs   - Assess for incontinence   - Turn and reposition patient  - Assist with mobility/ambulation  - Relieve pressure over bony prominences  - Avoid friction and shearing  - Provide appropriate hygiene as needed including keeping skin clean and dry  - Evaluate need for skin moisturizer/barrier cream  - Collaborate with interdisciplinary team   - Patient/family teaching  - Consider wound care consult   Outcome: Progressing

## 2020-03-05 NOTE — ASSESSMENT & PLAN NOTE
No evidence of withdrawal symptoms at this time- continue monitoring for significant psychomotor agitation

## 2020-03-05 NOTE — DISCHARGE SUMMARY
Discharge Summary - [facility] Internal Medicine    Patient Information: Lucie Snider 48 y o  female MRN: [id number]  Unit/Bed#: [address] 226- Encounter: [id number]    Discharging Physician / Practitioner: Chanel Marquez DO  PCP: YONG Regan  Admission Date: 3/2/2020  Discharge Date: 03/05/20    Disposition:     inpt psych    Reason for Admission: drug overdose    Discharge Diagnoses:     Principal Problem (Resolved):    Drug overdose, multiple drugs, intentional self-harm, initial encounter  Active Problems:    Essential hypertension    Acquired hypothyroidism    Alcohol use disorder, moderate, dependence ([facility] )    h/o Seizure ([facility] )    Bipolar I disorder, most recent episode depressed, severe without psychotic features ([facility] )    Tobacco abuse    Generalized anxiety disorder    Suicidal ideation    Suicide attempt ([facility] )  Resolved Problems:    Lactic acidosis    Hypotension      Consultations During Hospital Stay:  · Medical toxicology    Procedures Performed:     · none    Significant Findings / Test Results:   No results found  Incidental Findings:   · none    Test Results Pending at Discharge (will require follow up):   · none     Outpatient Tests Requested:  none    Hospital Course:     Lucie Snider is a 48 y o  female patient who originally presented to the hospital on 3/2/2020 due to Suicide attempt as drug overdose-due to the consumption of 30 pills of bp medications likely norvasc overdose  She was admitted to the icu and evaluated by medical toxicology  Pt has signed a 201  She was discharged to inpt psych  She had hypotension due to the overdose and bp medications were initially held  She was treated with pressors and her bp improved  She was restarted on her bp medications after her bp improved and pressors were held  Her norvasc was decreased to daily and metoprolol to 12 5mg bid  Pt had lactic acidosis as a result of the hypotension and this has since resolved   She also had acute toxic encephalopathy due to the over dose which has since resolved  She was continued on her psych medications for her bipolar d/o  She was discharged to in psych    Discharge Day Visit / Exam:     * Please refer to separate progress note for these details *    Discharge instructions/Information to patient and family:   See after visit summary for information provided to patient and family  Provisions for Follow-Up Care:  See after visit summary for information related to follow-up care and any pertinent home health orders  Discharge Statement:  I spent 33 minutes discharging the patient  This time was spent on the day of discharge  I had direct contact with the patient on the day of discharge  Greater than 50% of the total time was spent examining patient, answering all patient questions, arranging and discussing plan of care with patient as well as directly providing post-discharge instructions  Additional time then spent on discharge activities  Discharge Medications:  See after visit summary for reconciled discharge medications provided to patient and family

## 2020-03-05 NOTE — ASSESSMENT & PLAN NOTE
- patient with consumption of multiple medications including beta-blocker  She has been monitored without any evidence of significant toxidrome      A case discussed with case management, psychiatry note has been reviewed    Of the patient does agree to inpatient psychiatric placement    She is medically cleared for discharge

## 2020-03-05 NOTE — PROGRESS NOTES
Progress Note - Derick Hein 1965, 48 y o  female MRN: [id number]    Unit/Bed#: [address] 226-01 Encounter: [id number]    Primary Care Provider: YONG Howell   Date and time admitted to hospital: 3/2/2020  4:40 PM        Hypotension  Assessment & Plan  Resolved    Suicide attempt [facility])  Assessment & Plan  - continue one-to-one    Lactic acidosis  Assessment & Plan  - elevated in the setting of ingestion, no evidence of significant infection    Suicidal ideation  Assessment & Plan  - continual observation until discharge    Generalized anxiety disorder  Assessment & Plan  likely may need additional medication adjustments due to ongoing stressors    Tobacco abuse  Assessment & Plan  Recommend smoking cessation    Bipolar I disorder, most recent episode depressed, severe without psychotic features ([facility] )  Assessment & Plan  Resumed back on her home lithium as well as Latuda    h/o Seizure ([facility] )  Assessment & Plan  Continue home medications,    Alcohol use disorder, moderate, dependence ([facility] )  Assessment & Plan  No evidence of withdrawal symptoms at this time- continue monitoring for significant psychomotor agitation    Acquired hypothyroidism  Assessment & Plan  Resume Synthroid dose, TSH and free T4 within normal limits    Essential hypertension  Assessment & Plan  Continue home medications, metoprolol currently held for now  Resume back on clonidine to prevent rebound hypertension    * Drug overdose, multiple drugs, intentional self-harm, initial encounter  Assessment & Plan  - patient with consumption of multiple medications including beta-blocker  She has been monitored without any evidence of significant toxidrome      A case discussed with case management, psychiatry note has been reviewed    Of the patient does agree to inpatient psychiatric placement    She is medically cleared for discharge      [facility]'s Internal Medicine Progress Note  Patient: Derick Hein 48 y o  female   MRN: [id number]  PCP: YONG Estrada  Unit/Bed#: [place] [address] 2 -01 Encounter: [id number]  Date Of Visit: 20    Assessment:    Principal Problem:    Drug overdose, multiple drugs, intentional self-harm, initial encounter  Active Problems:    Essential hypertension    Acquired hypothyroidism    Alcohol use disorder, moderate, dependence (HCC)    h/o Seizure (HCC)    Bipolar I disorder, most recent episode depressed, severe without psychotic features ([facility] )    Tobacco abuse    Generalized anxiety disorder    Suicidal ideation    Lactic acidosis    Suicide attempt ([facility] )    Hypotension      Plan:    · Continual observation  · Medically cleared for discharge       VTE Pharmacologic Prophylaxis:   Pharmacologic: Enoxaparin (Lovenox)  Mechanical VTE Prophylaxis in Place: Yes    Patient Centered Rounds: I have performed bedside rounds with nursing staff today  Discussions with Specialists or Other Care Team Provider:  Case management    Education and Discussions with Family / Patient:  Patient    Time Spent for Care: 30 minutes  More than 50% of total time spent on counseling and coordination of care as described above  Current Length of Stay: 2 day(s)    Current Patient Status: Inpatient   Certification Statement: The patient will continue to require additional inpatient hospital stay due to Discharge planning    Discharge Plan / Estimated Discharge Date:  24 hours    Code Status: Level 1 - Full Code      Subjective:   Patient seen and examined, no chest pain shortness of breath difficulty breathing  No abdominal pain no nausea vomiting  No fevers    A complete and comprehensive 14 point organ system review has been performed and all other systems are negative other than stated above      Objective:     Vitals:   Temp (24hrs), Av 4 °F (36 9 °C), Min:98 2 °F (36 8 °C), Max:98 5 °F (36 9 °C)    Temp:  [98 2 °F (36 8 °C)-98 5 °F (36 9 °C)] 98 3 °F (36 8 °C)  HR:  [72-94] 80  Resp:  [14-20] 20  BP: (127-151)/(63-90) 138/85  SpO2:  [92 %-98 %] 98 %  Body mass index is 36 12 kg/m²  Input and Output Summary (last 24 hours): Intake/Output Summary (Last 24 hours) at 3/4/2020 2322  Last data filed at 3/4/2020 1200  Gross per 24 hour   Intake 2063 33 ml   Output 3350 ml   Net -1286 67 ml       Physical Exam:     General: well appearing, no acute distress  HEENT: atraumatic, PERRLA, moist mucosa, normal pharynx, normal tonsils and adenoids, normal tongue, no fluid in sinuses  Neck: Trachea midline, no carotid bruit, no masses  Respiratory: normal chest wall expansion, CTA B, no r/r/w, no rubs  Cardiovascular: RRR, no m/r/g, Normal S1 and S2  Abdomen: Soft, non-tender, non-distended, normal bowel sounds in all quadrants, no hepatosplenomegaly, no tympany  Rectal: deferred  Musculoskeletal: normal ROM in upper and lower extremities  Integumentary: warm, dry, and pink, with no rash, purpura, or petechia  Heme/Lymph: no lymphadenopathy, no bruises  Neurological: Cranial Nerves II-XII grossly intact, no tics, normal sensation to pressure and light touch  Psychiatric: cooperative with normal mood, affect, and cognition      Additional Data:     Labs:    Results from last 7 days   Lab Units 03/03/20  0434   WBC Thousand/uL 11 26*   HEMOGLOBIN g/dL 12 2   HEMATOCRIT % 37 6   PLATELETS Thousands/uL 319   NEUTROS PCT % 68   LYMPHS PCT % 23   MONOS PCT % 8   EOS PCT % 0     Results from last 7 days   Lab Units 03/03/20  0434 03/02/20  1704   POTASSIUM mmol/L 3 9 4 7   CHLORIDE mmol/L 107 101   CO2 mmol/L 29 25   BUN mg/dL 8 10   CREATININE mg/dL 0 89 1 14   CALCIUM mg/dL 8 5 9 5   ALK PHOS U/L  --  100   ALT U/L  --  30   AST U/L  --  16           * I Have Reviewed All Lab Data Listed Above  * Additional Pertinent Lab Tests Reviewed: [facility] Admission Reviewed    Imaging:   All previous imaging reviewed    Recent Cultures (last 7 days):           Last 24 Hours Medication List:     Current Facility-Administered Medications:  acetaminophen 650 mg Oral Q6H PRN Gera Chiu, DO   amLODIPine 5 mg Oral BID Gera Chiu, DO   cloNIDine 0 1 mg Oral BID Gera Chiu, DO   diphenhydrAMINE 25 mg Oral Q6H PRN Gera Chiu, DO   enoxaparin 40 mg Subcutaneous Daily Gera Chiu, DO   folic acid 1 mg Oral Daily Gera Chiu, DO   [START ON 6/4/7499] folic acid 1 mg Oral Daily Gera Chiu, DO   gabapentin 400 mg Oral TID Gera Chiu, DO   lithium carbonate 300 mg Oral QAM Gear Chiu, DO   lithium carbonate 450 mg Oral HS Gera Chiu, DO   losartan 100 mg Oral Daily Gera Chiu, DO   lurasidone 80 mg Oral Daily With Dinner Gera Chiu, DO   multivitamin-minerals 1 tablet Oral Daily Gera Chiu, DO   pantoprazole 40 mg Oral Early Morning Gera Chiu, DO   sucralfate 1 g Oral 4x Daily (AC & HS) Gera Chiu, DO   thiamine 100 mg Oral Daily Jackiegriffin Love,         Today, Patient Was Seen By: David Honeycutt DO    ** Please Note: This note has been constructed using a voice recognition system   **

## 2020-03-05 NOTE — PROGRESS NOTES
[facility] Internal Medicine Progress Note  Patient: Mellissa Tolliver 48 y o  female   MRN: [id number]  PCP: YONG Erickson  Unit/Bed#: [address] [address] 226-01 Encounter: [id number]  Date Of Visit: 03/05/20     Addendum: ekg shows sinus tach  Metoprolol restarted  Repeat vitals show heart rate in the 90s  Pt is cleared medically for inpt psych      Assessment/plan  1  Suicide attempt as drug overdose- due to the consumption of 30 pills of bp medications likely norvasc overdose  Pt has signed a 201  Continue one to one  2  Lactic acidosis- resolved    3  htn with recent hypotension due to number 1- metoprolol was on hold  She is on clonidine, norvasc, and cozaar  Will restart metoprolol at lower dose as pt is not bradycardic  4  Bipolar disorder- continue lithium and latuda    5  Tobacco abuse-encourage pt to stop smoking    6  H/o seizure- continue current treatment  No seizure activity reported    7  Hypothyroid- continue current synthroid dose    8  Tachycardia- likely sinus tach  Will obtain ekg  Will restart metoprolol at lower dose as this is likely sinus tach  Could be due to anxiety  9  Acute toxic encephalopathy due to number 1- resolved  dispo- if heart rate is stable pt will be medically cleared for discharge to inpt psych  Will check ekg  Subjective:   Pt seen and examined  Pt is a little agitated this am about having to go to psych  She wants to go to psych asap  She states she does not have f/c no cp no sob no n/v/d no abd pain  Objective:     Vitals: Blood pressure 135/87, pulse (!) 136, temperature 97 8 °F (36 6 °C), resp  rate 18, height 5' 3" (1 6 m), weight 92 kg (202 lb 13 2 oz), SpO2 96 %, not currently breastfeeding  ,Body mass index is 35 93 kg/m²      Lab, Imaging and other studies:  Results from last 7 days   Lab Units 03/03/20  0434   WBC Thousand/uL 11 26*   HEMOGLOBIN g/dL 12 2   HEMATOCRIT % 37 6   PLATELETS Thousands/uL 319     Results from last 7 days   Lab Units 03/03/20  0434 03/02/20  1704   POTASSIUM mmol/L 3 9 4 7   CHLORIDE mmol/L 107 101   CO2 mmol/L 29 25   BUN mg/dL 8 10   CREATININE mg/dL 0 89 1 14   CALCIUM mg/dL 8 5 9 5   ALK PHOS U/L  --  100   ALT U/L  --  30   AST U/L  --  16     Results from last 7 days   Lab Units 03/02/20  1704   TROPONIN I ng/mL <0 02     No results found for: Ardith Rees, WOUNDCULT, SPUTUMCULTUR      No results found  Scheduled Meds:   Current Facility-Administered Medications:  acetaminophen 650 mg Oral Q6H PRN Shaun D Gill, DO   amLODIPine 5 mg Oral BID Shaun D Gill, DO   cloNIDine 0 1 mg Oral BID Shaun D Gill, DO   diphenhydrAMINE 25 mg Oral Q6H PRN Cyrilla Blew, DO   enoxaparin 40 mg Subcutaneous Daily Shaun D Gill, DO   folic acid 1 mg Oral Daily Shaun D Gill, DO   folic acid 1 mg Oral Daily Shaun D Gill, DO   gabapentin 400 mg Oral TID Shaun D Gill, DO   lithium carbonate 300 mg Oral QAM Shaun D Gill, DO   lithium carbonate 450 mg Oral HS Shaun D Gill, DO   losartan 100 mg Oral Daily Shaun D Gill, DO   lurasidone 80 mg Oral Daily With Dinner Shaun D Gill, DO   multivitamin-minerals 1 tablet Oral Daily Shaun D Gill, DO   pantoprazole 40 mg Oral Early Morning Shaun D Gill, DO   sucralfate 1 g Oral 4x Daily (AC & HS) Shaun D Gill, DO   thiamine 100 mg Oral Daily Shaun D Gill, DO     Continuous Infusions:    PRN Meds:   acetaminophen    diphenhydrAMINE      Physical exam:  Physical Exam  General appearance: alert and oriented, in no acute distress  Head: Normocephalic, without obvious abnormality, atraumatic  Eyes: conjunctivae/corneas clear  PERRL, EOM's intact  Fundi benign    Neck: no adenopathy, no carotid bruit, no JVD, supple, symmetrical, trachea midline and thyroid not enlarged, symmetric, no tenderness/mass/nodules  Lungs: clear to auscultation bilaterally  Heart: tachy s1 s2  Abdomen: soft, non-tender; bowel sounds normal; no masses,  no organomegaly  Extremities: extremities normal, warm and well-perfused; no cyanosis, clubbing, or edema  Pulses: 2+ and symmetric  Skin: Skin color, texture, turgor normal  No rashes or lesions  Neurologic: Mental status: Alert, oriented, thought content appropriate      VTE Pharmacologic Prophylaxis: Enoxaparin (Lovenox)  VTE Mechanical Prophylaxis: sequential compression device    Counseling / Coordination of Care  Total floor / unit time spent today 20 minutes      Current Length of Stay: 3 day(s)    Current Patient Status: Inpatient     Code Status: Level 1 - Full Code

## 2020-03-05 NOTE — ASSESSMENT & PLAN NOTE
Continue home medications, metoprolol currently held for now    Resume back on clonidine to prevent rebound hypertension

## 2020-03-05 NOTE — SOCIAL WORK
Pt is medically cleared for d/c per Dr Ina Thornton  CM looking for in pt psych bed - pt singed 201 yesterday  Unfortunately [facility] has no psych beds in network  CM called [facility] and they have no beds either  CM sent referral to [facility]     CM met with pt at bedside  She "does not want to go far away "  At this time, pt denied suicidally  She understands that she made a mistake by taking the OD  She is asking if she can just go home and follow up with [facility] out patient psych provider L[facility]  However she has attempted suicdie several times  CM encouraged her to see if [facility] will accept her  Pt lives alone in a 2 story home; she is independent with personal care and ambulation  She is on SSD; she does not have a secondary to Medicare because her income is too high with SSD  CM following

## 2020-03-06 NOTE — SOCIAL WORK
pt was accepted at the [facility]  Pt is willing to go there    CM arranged transport there via [employer] [facility] call system; they are sending CTS at 5:30 PM   Pt aware

## 2020-03-13 ENCOUNTER — HOSPITAL ENCOUNTER (EMERGENCY)
Facility: HOSPITAL | Age: 55
End: 2020-03-14
Attending: EMERGENCY MEDICINE | Admitting: EMERGENCY MEDICINE
Payer: MEDICARE

## 2020-03-13 ENCOUNTER — APPOINTMENT (EMERGENCY)
Dept: RADIOLOGY | Facility: HOSPITAL | Age: 55
End: 2020-03-13
Payer: MEDICARE

## 2020-03-13 DIAGNOSIS — F10.10 ALCOHOL ABUSE: Primary | ICD-10-CM

## 2020-03-13 DIAGNOSIS — R45.851 SUICIDAL IDEATION: ICD-10-CM

## 2020-03-13 DIAGNOSIS — F32.A DEPRESSION: ICD-10-CM

## 2020-03-13 DIAGNOSIS — F31.4 BIPOLAR I DISORDER, MOST RECENT EPISODE DEPRESSED, SEVERE WITHOUT PSYCHOTIC FEATURES (HCC): Chronic | ICD-10-CM

## 2020-03-13 LAB
ALBUMIN SERPL BCP-MCNC: 4.2 G/DL (ref 3.5–5)
ALP SERPL-CCNC: 113 U/L (ref 46–116)
ALT SERPL W P-5'-P-CCNC: 57 U/L (ref 12–78)
AMPHETAMINES SERPL QL SCN: NEGATIVE
ANION GAP SERPL CALCULATED.3IONS-SCNC: 18 MMOL/L (ref 4–13)
AST SERPL W P-5'-P-CCNC: 28 U/L (ref 5–45)
ATRIAL RATE: 126 BPM
BACTERIA UR QL AUTO: ABNORMAL /HPF
BARBITURATES UR QL: NEGATIVE
BASOPHILS # BLD AUTO: 0.05 THOUSANDS/ΜL (ref 0–0.1)
BASOPHILS NFR BLD AUTO: 0 % (ref 0–1)
BENZODIAZ UR QL: NEGATIVE
BILIRUB DIRECT SERPL-MCNC: 0.1 MG/DL (ref 0–0.2)
BILIRUB SERPL-MCNC: 0.24 MG/DL (ref 0.2–1)
BILIRUB UR QL STRIP: NEGATIVE
BUN SERPL-MCNC: 9 MG/DL (ref 5–25)
CALCIUM SERPL-MCNC: 9.5 MG/DL (ref 8.3–10.1)
CHLORIDE SERPL-SCNC: 94 MMOL/L (ref 100–108)
CLARITY UR: CLEAR
CO2 SERPL-SCNC: 19 MMOL/L (ref 21–32)
COCAINE UR QL: NEGATIVE
COLOR UR: YELLOW
COLOR, POC: YELLOW
CREAT SERPL-MCNC: 0.81 MG/DL (ref 0.6–1.3)
EOSINOPHIL # BLD AUTO: 0 THOUSAND/ΜL (ref 0–0.61)
EOSINOPHIL NFR BLD AUTO: 0 % (ref 0–6)
ERYTHROCYTE [DISTWIDTH] IN BLOOD BY AUTOMATED COUNT: 12.8 % (ref 11.6–15.1)
ETHANOL SERPL-MCNC: 118 MG/DL (ref 0–3)
GFR SERPL CREATININE-BSD FRML MDRD: 84 ML/MIN/1.73SQ M
GLUCOSE SERPL-MCNC: 216 MG/DL (ref 65–140)
GLUCOSE SERPL-MCNC: 246 MG/DL (ref 65–140)
GLUCOSE UR STRIP-MCNC: ABNORMAL MG/DL
HCT VFR BLD AUTO: 41.4 % (ref 34.8–46.1)
HGB BLD-MCNC: 14.1 G/DL (ref 11.5–15.4)
HGB UR QL STRIP.AUTO: ABNORMAL
IMM GRANULOCYTES # BLD AUTO: 0.17 THOUSAND/UL (ref 0–0.2)
IMM GRANULOCYTES NFR BLD AUTO: 1 % (ref 0–2)
KETONES UR STRIP-MCNC: NEGATIVE MG/DL
LEUKOCYTE ESTERASE UR QL STRIP: ABNORMAL
LITHIUM SERPL-SCNC: <0.2 MMOL/L (ref 0.5–1)
LYMPHOCYTES # BLD AUTO: 3.08 THOUSANDS/ΜL (ref 0.6–4.47)
LYMPHOCYTES NFR BLD AUTO: 17 % (ref 14–44)
MCH RBC QN AUTO: 31.9 PG (ref 26.8–34.3)
MCHC RBC AUTO-ENTMCNC: 34.1 G/DL (ref 31.4–37.4)
MCV RBC AUTO: 94 FL (ref 82–98)
METHADONE UR QL: NEGATIVE
MONOCYTES # BLD AUTO: 1.3 THOUSAND/ΜL (ref 0.17–1.22)
MONOCYTES NFR BLD AUTO: 7 % (ref 4–12)
NEUTROPHILS # BLD AUTO: 13.81 THOUSANDS/ΜL (ref 1.85–7.62)
NEUTS SEG NFR BLD AUTO: 75 % (ref 43–75)
NITRITE UR QL STRIP: NEGATIVE
NON-SQ EPI CELLS URNS QL MICRO: ABNORMAL /HPF
NRBC BLD AUTO-RTO: 0 /100 WBCS
OPIATES UR QL SCN: NEGATIVE
P AXIS: 37 DEGREES
PCP UR QL: NEGATIVE
PH UR STRIP.AUTO: 5 [PH] (ref 4.5–8)
PLATELET # BLD AUTO: 429 THOUSANDS/UL (ref 149–390)
PMV BLD AUTO: 9.4 FL (ref 8.9–12.7)
POTASSIUM SERPL-SCNC: 4.3 MMOL/L (ref 3.5–5.3)
PR INTERVAL: 156 MS
PROT SERPL-MCNC: 8.7 G/DL (ref 6.4–8.2)
PROT UR STRIP-MCNC: NEGATIVE MG/DL
QRS AXIS: 65 DEGREES
QRSD INTERVAL: 68 MS
QT INTERVAL: 306 MS
QTC INTERVAL: 443 MS
RBC # BLD AUTO: 4.42 MILLION/UL (ref 3.81–5.12)
RBC #/AREA URNS AUTO: ABNORMAL /HPF
SODIUM SERPL-SCNC: 131 MMOL/L (ref 136–145)
SP GR UR STRIP.AUTO: 1.01 (ref 1–1.03)
T WAVE AXIS: 3 DEGREES
THC UR QL: NEGATIVE
UROBILINOGEN UR QL STRIP.AUTO: 0.2 E.U./DL
VENTRICULAR RATE: 126 BPM
WBC # BLD AUTO: 18.41 THOUSAND/UL (ref 4.31–10.16)
WBC #/AREA URNS AUTO: ABNORMAL /HPF

## 2020-03-13 PROCEDURE — 93005 ELECTROCARDIOGRAM TRACING: CPT

## 2020-03-13 PROCEDURE — 99285 EMERGENCY DEPT VISIT HI MDM: CPT

## 2020-03-13 PROCEDURE — 80320 DRUG SCREEN QUANTALCOHOLS: CPT | Performed by: EMERGENCY MEDICINE

## 2020-03-13 PROCEDURE — 71045 X-RAY EXAM CHEST 1 VIEW: CPT

## 2020-03-13 PROCEDURE — 36415 COLL VENOUS BLD VENIPUNCTURE: CPT | Performed by: EMERGENCY MEDICINE

## 2020-03-13 PROCEDURE — 80048 BASIC METABOLIC PNL TOTAL CA: CPT | Performed by: EMERGENCY MEDICINE

## 2020-03-13 PROCEDURE — 80178 ASSAY OF LITHIUM: CPT | Performed by: EMERGENCY MEDICINE

## 2020-03-13 PROCEDURE — 81001 URINALYSIS AUTO W/SCOPE: CPT

## 2020-03-13 PROCEDURE — 80076 HEPATIC FUNCTION PANEL: CPT | Performed by: EMERGENCY MEDICINE

## 2020-03-13 PROCEDURE — 96361 HYDRATE IV INFUSION ADD-ON: CPT

## 2020-03-13 PROCEDURE — 85025 COMPLETE CBC W/AUTO DIFF WBC: CPT | Performed by: EMERGENCY MEDICINE

## 2020-03-13 PROCEDURE — 96360 HYDRATION IV INFUSION INIT: CPT

## 2020-03-13 PROCEDURE — 80307 DRUG TEST PRSMV CHEM ANLYZR: CPT | Performed by: EMERGENCY MEDICINE

## 2020-03-13 PROCEDURE — 82948 REAGENT STRIP/BLOOD GLUCOSE: CPT

## 2020-03-13 PROCEDURE — 99284 EMERGENCY DEPT VISIT MOD MDM: CPT | Performed by: EMERGENCY MEDICINE

## 2020-03-13 PROCEDURE — 93010 ELECTROCARDIOGRAM REPORT: CPT | Performed by: INTERNAL MEDICINE

## 2020-03-13 RX ORDER — CHLORDIAZEPOXIDE HYDROCHLORIDE 25 MG/1
25 CAPSULE, GELATIN COATED ORAL ONCE
Status: DISCONTINUED | OUTPATIENT
Start: 2020-03-13 | End: 2020-03-13

## 2020-03-13 RX ORDER — SODIUM CHLORIDE 1000 MG
1 TABLET, SOLUBLE MISCELLANEOUS ONCE
Status: COMPLETED | OUTPATIENT
Start: 2020-03-13 | End: 2020-03-13

## 2020-03-13 RX ORDER — CHLORDIAZEPOXIDE HYDROCHLORIDE 25 MG/1
25 CAPSULE, GELATIN COATED ORAL EVERY 8 HOURS SCHEDULED
Status: DISCONTINUED | OUTPATIENT
Start: 2020-03-13 | End: 2020-03-14 | Stop reason: HOSPADM

## 2020-03-13 RX ORDER — CHLORDIAZEPOXIDE HYDROCHLORIDE 25 MG/1
25 CAPSULE, GELATIN COATED ORAL EVERY 8 HOURS SCHEDULED
Status: DISCONTINUED | OUTPATIENT
Start: 2020-03-13 | End: 2020-03-13

## 2020-03-13 RX ORDER — ACETAMINOPHEN 325 MG/1
650 TABLET ORAL ONCE
Status: COMPLETED | OUTPATIENT
Start: 2020-03-13 | End: 2020-03-13

## 2020-03-13 RX ORDER — DIAZEPAM 5 MG/1
10 TABLET ORAL ONCE
Status: COMPLETED | OUTPATIENT
Start: 2020-03-13 | End: 2020-03-13

## 2020-03-13 RX ADMIN — SODIUM CHLORIDE TAB 1 GM 1 G: 1 TAB at 21:14

## 2020-03-13 RX ADMIN — METOPROLOL TARTRATE 25 MG: 25 TABLET ORAL at 21:14

## 2020-03-13 RX ADMIN — DIAZEPAM 10 MG: 5 TABLET ORAL at 19:50

## 2020-03-13 RX ADMIN — METOPROLOL TARTRATE 25 MG: 25 TABLET ORAL at 15:38

## 2020-03-13 RX ADMIN — ACETAMINOPHEN 650 MG: 325 TABLET ORAL at 16:08

## 2020-03-13 RX ADMIN — CHLORDIAZEPOXIDE HYDROCHLORIDE 25 MG: 25 CAPSULE ORAL at 21:14

## 2020-03-13 RX ADMIN — CHLORDIAZEPOXIDE HYDROCHLORIDE 25 MG: 25 CAPSULE ORAL at 13:34

## 2020-03-13 RX ADMIN — SODIUM CHLORIDE 1000 ML: 0.9 INJECTION, SOLUTION INTRAVENOUS at 13:11

## 2020-03-13 NOTE — ED NOTES
[facility] contacted by RN   Per [facility] a Host representative will be out to evaluate pt in the ED     Son Baldwin RN  03/13/20 7006

## 2020-03-13 NOTE — ED NOTES
RN speaks with pt at this time in regards to her suicidal thoughts  Per host pt reported that she would have thoughts of suicide if she was d/c'd to home    When RN speaks to pt, pt confirms that she would "try to commit suicide if she was discharged home by overdosing on pills like she did before"  Crisis aware of pt comments and SI thoughts/plan     Mateo Singh RN  03/13/20 1704

## 2020-03-13 NOTE — LETTER
Section I - General Information    Name of Patient: Jennifer Mart                 : 1965    Medicare #:____________________  Transport Date: 20 (PCS is valid for round trips on this date and for all repetitive trips in the 60-day range as noted below )  Origin: [place]  [id number]                                                         Destination:[address] ________________________________________________  Is the pt's stay covered under Medicare Part A (PPS/DRG)     (X) YES  (_) NO  Closest appropriate facility? (X) YES  (_) NO  If no, why is transport to more distant facility required?________________________  If hosp-hosp transfer, describe services needed at 2nd facility not available at 1st facility? _________________________________  If hospice pt, is this transport related to pt's terminal illness? (_) YES (X) NO Describe____________________________________    Section II - Medical Necessity Questionnaire  Ambulance transportation is medically necessary only if other means of transport are contraindicated or would be potentially harmful to the patient  To meet this requirement, the patient must either be "bed confined" or suffer from a condition such that transport by means other than ambulance is contraindicated by the patient's condition   The following questions must be answered by the medical professional signing below for this form to be valid:    1)  Describe the MEDICAL CONDITION (physical and/or mental) of this patient AT [address] [place] [address] that requires the patient to be transported in an ambulance and why transport by other means is contraindicated by the patient's condition:__________________________________________________________________________________________________    2) Is the patient "bed confined" as defined below?     (_) YES  (X) NO  To be "be confined" the patient must satisfy all three of the following conditions: (1) unable to get up from bed without Assistance; AND (2) unable to ambulate; AND (3) unable to sit in a chair or wheelchair  3) Can this patient safely be transported by car or wheelchair [place] (i e , seated during transport without a medical attendant or monitoring)?   (_) YES  (X) NO    4) In addition to completing questions 1-3 above, please check any of the following conditions that apply*:  *Note: supporting documentation for any boxes checked must be maintained in the patient's medical records  (_)Contractures   (_)Non-Healed Fractures  (_)Patient is confused (_)Patient is comatose (_)Moderate/severe pain on movement (X)Danger to self/others  (_)IV meds/fluids required (_)Patient is combative(_)Need or possible need for restraints (_)DVT requires elevation of lower extremity  (_)Medical attendant required (_)Requires oxygen-unable to self administer (_)Special handling/isolation/infection control precautions required (_)Unable to tolerate seated position for time needed to transport (_)Hemodynamic monitoring required en route (_)Unable to sit in a chair or wheelchair due to decubitus ulcers or other wounds (_)Cardiac monitoring required en route (_)Morbid obesity requires additional personnel/equipment to safely handle patient (_)Orthopedic device (backboard, halo, pins, traction, brace, wedge, etc,) requiring special handling during transport (_)Other(specify)_______________________________________________    Section III - Signature of Physician or Healthcare Professional  I certify that the above information is true and correct based on my evaluation of this patient, and represent that the patient requires transport by ambulance and that other forms of transport are contraindicated   I understand that this information will be used by the Centers for Medicare and Medicaid Services (CMS) to support the determination of medical necessity for ambulance services, and I represent that I have personal knowledge of the patient's condition at time of transport  (_) If this box is checked, I also certify that the patient is physically or mentally incapable of signing the ambulance service's claim and that the institution with which I am affiliated has furnished care, services, or assistance to the patient  My signature below is made on behalf of the patient pursuant to 42 CFR §424 36(b)(4)  In accordance with 42 CFR §424 37, the specific reason(s) that the patient is physically or mentally incapable of signing the claim form is as follows: _________________________________________________________________________________________________________      Signature of Physician* or Healthcare Professional______________________________________________________________  Signature Date 03/14/20 (For scheduled repetitive transports, this form is not valid for transports performed more than 60 days after this date)    Printed Name & Credentials of Physician or Healthcare Professional (MD, DO, RN, etc )________________________________  *Form must be signed by patient's attending physician for scheduled, repetitive transports   For non-repetitive, unscheduled ambulance transports, if unable to obtain the signature of the attending physician, any of the following may sign (choose appropriate option below)  (_) Physician Assistant (_)  Clinical Nurse Specialist (_)  Registered Nurse  (_)  Nurse Practitioner  (X) Discharge Planner

## 2020-03-13 NOTE — ED NOTES
Pt reports that she is not feeling well because she needs her Metoprolol  Dr Sakshi Bhardwaj made aware, will order meds for pt  Pt also pulled out her IV accidentally   No need for new IV per Dr Jody Libman, RN  03/13/20 0114

## 2020-03-13 NOTE — ED NOTES
Pt reports that she has not been taking her home medications     Lianet Alvarenga, MARK ANTHONY  03/13/20 [id number]

## 2020-03-13 NOTE — ED PROVIDER NOTES
History  Chief Complaint   Patient presents with    Alcohol Problem     Pt  reports "I'm an alcoholic and I need help " Last drink was today  Also reports she fell down unknown amount of steps today and is c/o left elbow pain, denies striking head  -LOC  Denies drug use  Denies SI/HI/AH/VH     47 YO female with Hx of alcoholism presents for rehab  Pt states she drinks daily, this morning she did drink 3 beers which is less than usual  Pt states she did fall down steps today, unsure how many  States she did not strike her head and did not lose consciousness  She does have pain in the Left elbow but denies limitations in range of motion  Pt has no chest pain or shortness of breath  She did have some nausea earlier but this improved with alcohol  Pt denies previously being in rehab  States she does get anxiety when she does not drink, has never had a seizure  Pt denies CP/SOB/F/C/D/C, no dysuria, burning on urination or blood in urine  History provided by:  Patient   used: No    Alcohol Problem   Severity:  Moderate  Onset quality:  Gradual  Timing:  Constant  Progression:  Waxing and waning  Chronicity:  Chronic  Relieved by:  Nothing  Worsened by:  Nothing  Ineffective treatments:  None tried  Associated symptoms: nausea    Associated symptoms: no abdominal pain, no chest pain, no diarrhea, no fatigue, no fever, no rash, no shortness of breath and no vomiting    Nausea:     Severity:  Moderate    Onset quality:  Gradual      Prior to Admission Medications   Prescriptions Last Dose Informant Patient Reported? Taking?    amLODIPine (NORVASC) 5 mg tablet Not Taking at Unknown time  No No   Sig: Take 1 tablet (5 mg total) by mouth daily   Patient not taking: Reported on 3/13/2020   cloNIDine (CATAPRES) 0 1 mg tablet Not Taking at Unknown time  No No   Sig: Take 1 tablet (0 1 mg total) by mouth 2 (two) times a day   Patient not taking: Reported on 6/21/4065   folic acid (FOLVITE) 1 mg tablet Not Taking at Unknown time  No No   Sig: Take 1 tablet (1 mg total) by mouth daily   Patient not taking: Reported on 3/13/2020   gabapentin (NEURONTIN) 300 mg capsule Not Taking at Unknown time  No No   Sig: Take 2 capsules (600 mg total) by mouth daily at bedtime   Patient not taking: Reported on 3/13/2020   gabapentin (NEURONTIN) 400 mg capsule Not Taking at Unknown time  No No   Sig: Take 1 capsule (400 mg total) by mouth 3 (three) times a day   Patient not taking: Reported on 3/13/2020   lithium carbonate 150 mg capsule Not Taking at Unknown time  No No   Sig: Take 3 capsules (450 mg total) by mouth daily at bedtime   Patient not taking: Reported on 3/13/2020   lithium carbonate 300 mg capsule Not Taking at Unknown time  No No   Sig: Take 1 capsule (300 mg total) by mouth every morning   Patient not taking: Reported on 3/13/2020   losartan (COZAAR) 100 MG tablet Not Taking at Unknown time  No No   Sig: Take 1 tablet (100 mg total) by mouth daily   Patient not taking: Reported on 3/13/2020   lurasidone (LATUDA) 80 mg tablet Not Taking at Unknown time  No No   Sig: Take 1 tablet (80 mg total) by mouth daily with dinner   Patient not taking: Reported on 3/13/2020   metoprolol tartrate (LOPRESSOR) 25 mg tablet Not Taking at Unknown time  No No   Sig: Take 0 5 tablets (12 5 mg total) by mouth every 12 (twelve) hours   Patient not taking: Reported on 3/13/2020   nicotine (NICODERM CQ) 21 mg/24 hr TD 24 hr patch Not Taking at Unknown time  No No   Sig: Place 1 patch on the skin daily   Patient not taking: Reported on 3/13/2020   pantoprazole (PROTONIX) 40 mg tablet Not Taking at Unknown time  No No   Sig: Take 1 tablet (40 mg total) by mouth daily in the early morning   Patient not taking: Reported on 3/13/2020   sucralfate (CARAFATE) 1 g tablet Not Taking at Unknown time  No No   Sig: Take 1 tablet (1 g total) by mouth 4 (four) times a day (before meals and at bedtime)   Patient not taking: Reported on 3/13/2020 thiamine 100 MG tablet Not Taking at Unknown time  No No   Sig: Take 1 tablet (100 mg total) by mouth daily   Patient not taking: Reported on 3/13/2020      Facility-Administered Medications: None       Past Medical History:   Diagnosis Date    Alcohol abuse     Alcoholism ([facility] )     Anxiety     Bipolar disorder ([facility] )     Bowel obstruction (HCC)     Depression     Gastritis     Head injury     Heart palpitations     Hyperlipidemia     Hypertension     Hypothyroidism     PTSD (post-traumatic stress disorder)     Seizure ([place] [address] )     Sleep difficulties     Suicide attempt [facility])        Past Surgical History:   Procedure Laterality Date    ABDOMINAL SURGERY      APPENDECTOMY      BOWEL RESECTION      CHOLECYSTECTOMY      ESOPHAGOGASTRODUODENOSCOPY N/A 5/18/2018    Procedure: ESOPHAGOGASTRODUODENOSCOPY (EGD) with bx;  Surgeon: Leona Barron DO;  Location: [facility]; Service: Gastroenterology    HYSTERECTOMY      KNEE SURGERY Bilateral        Family History   Problem Relation Age of Onset    Diabetes Father      I have reviewed and agree with the history as documented  E-Cigarette/Vaping    E-Cigarette Use Never User      E-Cigarette/Vaping Substances     Social History     Tobacco Use    Smoking status: Current Every Day Smoker     Packs/day: 1 00     Years: 25 00     Pack years: 25 00     Types: Cigarettes    Smokeless tobacco: Never Used   Substance Use Topics    Alcohol use: Yes     Alcohol/week: 21 0 standard drinks     Types: 21 Cans of beer per week     Frequency: Patient refused     Drinks per session: Patient refused     Binge frequency: Patient refused     Comment: daily, last use today    Drug use: No       Review of Systems   Constitutional: Negative for chills, fatigue and fever  HENT: Negative for dental problem  Eyes: Negative for visual disturbance  Respiratory: Negative for shortness of breath  Cardiovascular: Negative for chest pain     Gastrointestinal: Positive for nausea  Negative for abdominal pain, diarrhea and vomiting  Genitourinary: Negative for dysuria and frequency  Musculoskeletal: Negative for arthralgias  Skin: Negative for rash  Neurological: Negative for dizziness, weakness and light-headedness  Psychiatric/Behavioral: Negative for agitation, behavioral problems and confusion  All other systems reviewed and are negative  Physical Exam  Physical Exam   Constitutional: She is oriented to person, place, and time  She appears well-developed and well-nourished  HENT:   Head: Normocephalic and atraumatic  Eyes: EOM are normal    Neck: Normal range of motion  Cardiovascular: Regular rhythm and normal heart sounds  Tachycardia present  Pulmonary/Chest: Effort normal and breath sounds normal    Abdominal: Soft  Musculoskeletal: Normal range of motion  Neurological: She is alert and oriented to person, place, and time  Skin: Skin is warm and dry  Psychiatric: She has a normal mood and affect  Her behavior is normal  Thought content normal    Nursing note and vitals reviewed        Vital Signs  ED Triage Vitals   Temperature Pulse Respirations Blood Pressure SpO2   03/13/20 1143 03/13/20 1143 03/13/20 1143 03/13/20 1143 03/13/20 1143   98 6 °F (37 °C) (!) 142 18 142/77 97 %      Temp Source Heart Rate Source Patient Position - Orthostatic VS BP Location FiO2 (%)   03/13/20 2319 03/13/20 1308 03/13/20 1308 03/13/20 1308 --   Oral Monitor Lying Right arm       Pain Score       03/13/20 1437       No Pain           Vitals:    03/13/20 2347 03/14/20 0607 03/14/20 0737 03/14/20 1228   BP: 141/74 133/93 143/98 130/80   Pulse: 99 83 94 85   Patient Position - Orthostatic VS: Lying Lying Sitting Lying         Visual Acuity      ED Medications  Medications   chlordiazePOXIDE (LIBRIUM) capsule 25 mg (25 mg Oral Given 3/14/20 1436)   sodium chloride 0 9 % bolus 1,000 mL (0 mL Intravenous Stopped 3/13/20 1539)   metoprolol tartrate (LOPRESSOR) tablet 25 mg (25 mg Oral Given 3/13/20 1538)   acetaminophen (TYLENOL) tablet 650 mg (650 mg Oral Given 3/13/20 1608)   diazepam (VALIUM) tablet 10 mg (10 mg Oral Given 3/13/20 1950)   metoprolol tartrate (LOPRESSOR) tablet 25 mg (25 mg Oral Given 3/13/20 2114)   sodium chloride tablet 1 g (1 g Oral Given 3/13/20 2114)   chlordiazePOXIDE (LIBRIUM) capsule 25 mg (25 mg Oral Given 3/14/20 0832)   metoprolol tartrate (LOPRESSOR) tablet 25 mg (25 mg Oral Given 3/14/20 0927)       Diagnostic Studies  Results Reviewed     Procedure Component Value Units Date/Time    CBC and differential [[id number]]  (Abnormal) Collected:  03/14/20 1211    Lab Status:  Final result Specimen:  Blood from Arm, Right Updated:  03/14/20 1218     WBC 11 12 Thousand/uL      RBC 4 18 Million/uL      Hemoglobin 13 1 g/dL      Hematocrit 39 9 %      MCV 96 fL      MCH 31 3 pg      MCHC 32 8 g/dL      RDW 12 9 %      MPV 9 4 fL      Platelets 234 Thousands/uL      nRBC 0 /100 WBCs      Neutrophils Relative 69 %      Immat GRANS % 0 %      Lymphocytes Relative 23 %      Monocytes Relative 8 %      Eosinophils Relative 0 %      Basophils Relative 0 %      Neutrophils Absolute 7 61 Thousands/µL      Immature Grans Absolute 0 05 Thousand/uL      Lymphocytes Absolute 2 50 Thousands/µL      Monocytes Absolute 0 92 Thousand/µL      Eosinophils Absolute 0 01 Thousand/µL      Basophils Absolute 0 03 Thousands/µL     Basic metabolic panel [[id number]]  (Abnormal) Collected:  03/13/20 2342    Lab Status:  Final result Specimen:  Blood from Arm, Right Updated:  03/14/20 0008     Sodium 140 mmol/L      Potassium 3 9 mmol/L      Chloride 102 mmol/L      CO2 28 mmol/L      ANION GAP 10 mmol/L      BUN 12 mg/dL      Creatinine 0 90 mg/dL      Glucose 163 mg/dL      Calcium 8 9 mg/dL      eGFR 74 ml/min/1 73sq m     Narrative:       [place] guidelines for Chronic Kidney Disease (CKD):     Stage 1 with normal or high GFR (GFR > 90 mL/min/1 73 square meters)    Stage 2 Mild CKD (GFR = 60-89 mL/min/1 73 square meters)    Stage 3A Moderate CKD (GFR = 45-59 mL/min/1 73 square meters)    Stage 3B Moderate CKD (GFR = 30-44 mL/min/1 73 square meters)    Stage 4 Severe CKD (GFR = 15-29 mL/min/1 73 square meters)    Stage 5 End Stage CKD (GFR <15 mL/min/1 73 square meters)  Note: GFR calculation is accurate only with a steady state creatinine    Fingerstick Glucose (POCT) [[id number]]  (Abnormal) Collected:  03/13/20 2058    Lab Status:  Final result Updated:  03/13/20 2059     POC Glucose 216 mg/dl     Lithium level [[id number]]  (Abnormal) Collected:  03/13/20 1628    Lab Status:  Final result Specimen:  Blood from Arm, Left Updated:  03/13/20 1752     Lithium Lvl <0 2 mmol/L     Rapid drug screen, urine [[id number]]  (Normal) Collected:  03/13/20 1238    Lab Status:  Final result Specimen:  Urine, Clean Catch Updated:  03/13/20 1553     Amph/Meth UR Negative     Barbiturate Ur Negative     Benzodiazepine Urine Negative     Cocaine Urine Negative     Methadone Urine Negative     Opiate Urine Negative     PCP Ur Negative     THC Urine Negative    Narrative:       FOR MEDICAL PURPOSES ONLY  IF CONFIRMATION NEEDED PLEASE CONTACT THE LAB WITHIN 5 DAYS      Drug Screen Cutoff Levels:  AMPHETAMINE/METHAMPHETAMINES  1000 ng/mL  BARBITURATES     200 ng/mL  BENZODIAZEPINES     200 ng/mL  COCAINE      300 ng/mL  METHADONE      300 ng/mL  OPIATES      300 ng/mL  PHENCYCLIDINE     25 ng/mL  THC       50 ng/mL      Hepatic function panel [[id number]]  (Abnormal) Collected:  03/13/20 1304    Lab Status:  Final result Specimen:  Blood from Arm, Left Updated:  03/13/20 1541     Total Bilirubin 0 24 mg/dL      Bilirubin, Direct 0 10 mg/dL      Alkaline Phosphatase 113 U/L      AST 28 U/L      ALT 57 U/L      Total Protein 8 7 g/dL      Albumin 4 2 g/dL     Basic metabolic panel [[id number]]  (Abnormal) Collected:  03/13/20 1304    Lab Status:  Final result Specimen:  Blood from Arm, Left Updated:  03/13/20 1448     Sodium 131 mmol/L      Potassium 4 3 mmol/L      Chloride 94 mmol/L      CO2 19 mmol/L      ANION GAP 18 mmol/L      BUN 9 mg/dL      Creatinine 0 81 mg/dL      Glucose 246 mg/dL      Calcium 9 5 mg/dL      eGFR 84 ml/min/1 73sq m     Narrative:       [place] guidelines for Chronic Kidney Disease (CKD):     Stage 1 with normal or high GFR (GFR > 90 mL/min/1 73 square meters)    Stage 2 Mild CKD (GFR = 60-89 mL/min/1 73 square meters)    Stage 3A Moderate CKD (GFR = 45-59 mL/min/1 73 square meters)    Stage 3B Moderate CKD (GFR = 30-44 mL/min/1 73 square meters)    Stage 4 Severe CKD (GFR = 15-29 mL/min/1 73 square meters)    Stage 5 End Stage CKD (GFR <15 mL/min/1 73 square meters)  Note: GFR calculation is accurate only with a steady state creatinine    Ethanol [[id number]]  (Abnormal) Collected:  03/13/20 1304    Lab Status:  Final result Specimen:  Blood from Arm, Left Updated:  03/13/20 1338     Ethanol Lvl 118 mg/dL     CBC and differential [[id number]]  (Abnormal) Collected:  03/13/20 1304    Lab Status:  Final result Specimen:  Blood from Arm, Left Updated:  03/13/20 1319     WBC 18 41 Thousand/uL      RBC 4 42 Million/uL      Hemoglobin 14 1 g/dL      Hematocrit 41 4 %      MCV 94 fL      MCH 31 9 pg      MCHC 34 1 g/dL      RDW 12 8 %      MPV 9 4 fL      Platelets 668 Thousands/uL      nRBC 0 /100 WBCs      Neutrophils Relative 75 %      Immat GRANS % 1 %      Lymphocytes Relative 17 %      Monocytes Relative 7 %      Eosinophils Relative 0 %      Basophils Relative 0 %      Neutrophils Absolute 13 81 Thousands/µL      Immature Grans Absolute 0 17 Thousand/uL      Lymphocytes Absolute 3 08 Thousands/µL      Monocytes Absolute 1 30 Thousand/µL      Eosinophils Absolute 0 00 Thousand/µL      Basophils Absolute 0 05 Thousands/µL     POCT urinalysis dipstick [[id number]]  (Normal) Resulted:  03/13/20 1312    Lab Status: Final result Specimen:  Urine Updated:  03/13/20 1312     Color, UA yellow    Urine Microscopic [[id number]]  (Abnormal) Collected:  03/13/20 1236    Lab Status:  Final result Specimen:  Urine, Clean Catch Updated:  03/13/20 1255     RBC, UA 1-2 /hpf      WBC, UA 4-10 /hpf      Epithelial Cells Occasional /hpf      Bacteria, UA Occasional /hpf     Urine Macroscopic, POC [[id number]]  (Abnormal) Collected:  03/13/20 1236    Lab Status:  Final result Specimen:  Urine Updated:  03/13/20 1237     Color, UA Yellow     Clarity, UA Clear     pH, UA 5 0     Leukocytes, UA Trace     Nitrite, UA Negative     Protein, UA Negative mg/dl      Glucose,  (1/10%) mg/dl      Ketones, UA Negative mg/dl      Urobilinogen, UA 0 2 E U /dl      Bilirubin, UA Negative     Blood, UA Trace     Specific [place], UA 1 010    Narrative:       CLINITEK RESULT                 XR chest 1 view portable   Final Result by Senait Mao MD (03/13 1443)      No interval change  No focal airspace consolidation identified  Workstation performed: [id number]                    Procedures  ECG 12 Lead Documentation Only  Date/Time: 3/13/2020 3:42 PM  Performed by: Ricky Nogueira MD  Authorized by: Ricky Nogueira MD     ECG reviewed by me, the ED Provider: yes    Patient location:  ED  Previous ECG:     Previous ECG:  Compared to current  Interpretation:     Interpretation: normal    Rate:     ECG rate:  126    ECG rate assessment: tachycardic    Rhythm:     Rhythm: sinus rhythm and sinus tachycardia    QRS:     QRS axis:  Normal    QRS intervals:  Normal  Conduction:     Conduction: normal    ST segments:     ST segments:  Normal  T waves:     T waves: normal               ED Course  ED Course as of Mar 14 1801   Fri Mar 13, 2020   1500 Re-evaluated pt, continues to have tachycardia, states this is typical, she has been told to follow up for evaluation of this with cardiology   Pt denies any complaints at this time, states no chest pain, nausea, shortness of breath at this time  MDM  Number of Diagnoses or Management Options  Alcohol abuse: new and requires workup  Depression: new and requires workup  Suicidal ideation: new and requires workup  Diagnosis management comments: 1  Alcohol problem - Pt with alcohol abuse, requesting help  Pt appears generally well, she has no chest pain or shortness of breath  Pt states she does have a Hx of tachycardia, was supposed to see a cardiologist for this  Will check ECG to assess tachycardia, check CBC, metabolic panel for electrolyte abnormalities and dehydration, urine for infection  Will check alcohol level and UDS  Will give fluids, contact HOST  Amount and/or Complexity of Data Reviewed  Clinical lab tests: ordered and reviewed  Obtain history from someone other than the patient: yes  Independent visualization of images, tracings, or specimens: yes    Patient Progress  Patient progress: stable        Disposition  Final diagnoses:   Alcohol abuse   Depression   Suicidal ideation     Time reflects when diagnosis was documented in both MDM as applicable and the Disposition within this note     Time User Action Codes Description Comment    3/14/2020  1:13 PM Twyla Silva Add [F10 10] Alcohol abuse     3/14/2020  1:13 PM Twyla Silva Add [F32 9] Depression     3/14/2020  1:14 PM Twyla Silva Add [R45 851] Suicidal ideation     3/14/2020  1:18 PM Isauro, [address] [F31 4] Bipolar I disorder, most recent episode depressed, severe without psychotic features [facility]     3/14/2020  1:18 PM [address] [F31 4] Bipolar I disorder, most recent episode depressed, severe without psychotic features [facility])       ED Disposition     ED Disposition Condition Date/Time Comment    Transfer to [facility]  Sat Mar 14, 2020  1:16 PM Carmina Diego should be transferred out to psychiatric facility and has been medically cleared           MD Documentation      Most Recent Value   Patient Condition  The patient has been stabilized such that within reasonable medical probability, no material deterioration of the patient condition or the condition of the unborn child(sage) is likely to result from the transfer   Reason for Transfer  Level of Care needed not available at this facility   Benefits of Transfer  Continuity of care   Risks of Transfer  Potential for delay in receiving treatment, Potential deterioration of medical condition, Increased discomfort during transfer   Accepting Physician  DR Irineo Pittman 60 Name, [place] [address] [facility], [address] [place] [address]     (Name & Tel number)  Sher Godoy ([address] [place] [address] [facility] [phone]   Transported by (Company and Unit #)  Gene Dean MD  [address] [place] [address]    Provider Certification  General risk, such as traffic hazards, adverse weather conditions, rough terrain or turbulence, possible failure of equipment (including vehicle or aircraft), or consequences of actions of persons outside the control of the transport personnel      RN Documentation      [address] Name, Molly Brewer   [facility], [address] [place] [address]     (Name & Tel number)  Sher Godoy ([address] [place] [address]) [phone]   Medications Reviewed with Next Provider of Service  Yes   Transport Mode  Ambulance [[facility]]   Transported by (Company and Unit #)  [facility]    Level of Care  Basic life support [[facility] ]   Copies of Medical Records Sent  Transfer form   Patient Belongings Disposition  Sent with patient      Follow-up Information    None         Patient's Medications   Discharge Prescriptions    No medications on file     No discharge procedures on file      PDMP Review     None          ED Provider  Electronically Signed by           Yordan Liu MD  03/14/20 7884

## 2020-03-14 ENCOUNTER — HOSPITAL ENCOUNTER (INPATIENT)
Facility: HOSPITAL | Age: 55
LOS: 5 days | Discharge: HOME | DRG: 897 | End: 2020-03-19
Attending: PSYCHIATRY & NEUROLOGY | Admitting: PSYCHIATRY & NEUROLOGY
Payer: MEDICARE

## 2020-03-14 VITALS
RESPIRATION RATE: 18 BRPM | WEIGHT: 201.28 LBS | SYSTOLIC BLOOD PRESSURE: 128 MMHG | DIASTOLIC BLOOD PRESSURE: 88 MMHG | BODY MASS INDEX: 35.66 KG/M2 | HEART RATE: 83 BPM | TEMPERATURE: 98.5 F | OXYGEN SATURATION: 99 %

## 2020-03-14 DIAGNOSIS — K21.9 GERD (GASTROESOPHAGEAL REFLUX DISEASE): ICD-10-CM

## 2020-03-14 DIAGNOSIS — I10 ESSENTIAL HYPERTENSION: ICD-10-CM

## 2020-03-14 DIAGNOSIS — F41.1 GENERALIZED ANXIETY DISORDER: Chronic | ICD-10-CM

## 2020-03-14 DIAGNOSIS — F10.24 ALCOHOL DEPENDENCE WITH ALCOHOL-INDUCED MOOD DISORDER (HCC): ICD-10-CM

## 2020-03-14 DIAGNOSIS — Z72.0 TOBACCO ABUSE: ICD-10-CM

## 2020-03-14 DIAGNOSIS — F31.4 BIPOLAR AFFECTIVE DISORDER, DEPRESSED, SEVERE (HCC): Primary | ICD-10-CM

## 2020-03-14 DIAGNOSIS — F10.20 ALCOHOL USE DISORDER, MODERATE, DEPENDENCE (HCC): Chronic | ICD-10-CM

## 2020-03-14 DIAGNOSIS — F10.10 ALCOHOL ABUSE: ICD-10-CM

## 2020-03-14 DIAGNOSIS — F31.4 BIPOLAR I DISORDER, MOST RECENT EPISODE DEPRESSED, SEVERE WITHOUT PSYCHOTIC FEATURES (HCC): Chronic | ICD-10-CM

## 2020-03-14 LAB
ANION GAP SERPL CALCULATED.3IONS-SCNC: 10 MMOL/L (ref 4–13)
BASOPHILS # BLD AUTO: 0.03 THOUSANDS/ΜL (ref 0–0.1)
BASOPHILS NFR BLD AUTO: 0 % (ref 0–1)
BUN SERPL-MCNC: 12 MG/DL (ref 5–25)
CALCIUM SERPL-MCNC: 8.9 MG/DL (ref 8.3–10.1)
CHLORIDE SERPL-SCNC: 102 MMOL/L (ref 100–108)
CO2 SERPL-SCNC: 28 MMOL/L (ref 21–32)
CREAT SERPL-MCNC: 0.9 MG/DL (ref 0.6–1.3)
EOSINOPHIL # BLD AUTO: 0.01 THOUSAND/ΜL (ref 0–0.61)
EOSINOPHIL NFR BLD AUTO: 0 % (ref 0–6)
ERYTHROCYTE [DISTWIDTH] IN BLOOD BY AUTOMATED COUNT: 12.9 % (ref 11.6–15.1)
GFR SERPL CREATININE-BSD FRML MDRD: 74 ML/MIN/1.73SQ M
GLUCOSE SERPL-MCNC: 163 MG/DL (ref 65–140)
HCT VFR BLD AUTO: 39.9 % (ref 34.8–46.1)
HGB BLD-MCNC: 13.1 G/DL (ref 11.5–15.4)
IMM GRANULOCYTES # BLD AUTO: 0.05 THOUSAND/UL (ref 0–0.2)
IMM GRANULOCYTES NFR BLD AUTO: 0 % (ref 0–2)
LYMPHOCYTES # BLD AUTO: 2.5 THOUSANDS/ΜL (ref 0.6–4.47)
LYMPHOCYTES NFR BLD AUTO: 23 % (ref 14–44)
MCH RBC QN AUTO: 31.3 PG (ref 26.8–34.3)
MCHC RBC AUTO-ENTMCNC: 32.8 G/DL (ref 31.4–37.4)
MCV RBC AUTO: 96 FL (ref 82–98)
MONOCYTES # BLD AUTO: 0.92 THOUSAND/ΜL (ref 0.17–1.22)
MONOCYTES NFR BLD AUTO: 8 % (ref 4–12)
NEUTROPHILS # BLD AUTO: 7.61 THOUSANDS/ΜL (ref 1.85–7.62)
NEUTS SEG NFR BLD AUTO: 69 % (ref 43–75)
NRBC BLD AUTO-RTO: 0 /100 WBCS
PLATELET # BLD AUTO: 343 THOUSANDS/UL (ref 149–390)
PMV BLD AUTO: 9.4 FL (ref 8.9–12.7)
POTASSIUM SERPL-SCNC: 3.9 MMOL/L (ref 3.5–5.3)
RBC # BLD AUTO: 4.18 MILLION/UL (ref 3.81–5.12)
SODIUM SERPL-SCNC: 140 MMOL/L (ref 136–145)
WBC # BLD AUTO: 11.12 THOUSAND/UL (ref 4.31–10.16)

## 2020-03-14 PROCEDURE — 85025 COMPLETE CBC W/AUTO DIFF WBC: CPT | Performed by: EMERGENCY MEDICINE

## 2020-03-14 PROCEDURE — 36415 COLL VENOUS BLD VENIPUNCTURE: CPT | Performed by: EMERGENCY MEDICINE

## 2020-03-14 RX ORDER — FOLIC ACID 1 MG/1
1 TABLET ORAL DAILY
Status: DISCONTINUED | OUTPATIENT
Start: 2020-03-15 | End: 2020-03-19 | Stop reason: HOSPADM

## 2020-03-14 RX ORDER — TRAZODONE HYDROCHLORIDE 50 MG/1
50 TABLET ORAL
Status: DISCONTINUED | OUTPATIENT
Start: 2020-03-14 | End: 2020-03-19 | Stop reason: HOSPADM

## 2020-03-14 RX ORDER — RISPERIDONE 1 MG/1
1 TABLET, ORALLY DISINTEGRATING ORAL EVERY 4 HOURS PRN
Status: DISCONTINUED | OUTPATIENT
Start: 2020-03-14 | End: 2020-03-19 | Stop reason: HOSPADM

## 2020-03-14 RX ORDER — ACETAMINOPHEN 325 MG/1
650 TABLET ORAL EVERY 4 HOURS PRN
Status: DISCONTINUED | OUTPATIENT
Start: 2020-03-14 | End: 2020-03-19 | Stop reason: HOSPADM

## 2020-03-14 RX ORDER — HYDROXYZINE HYDROCHLORIDE 25 MG/1
25 TABLET, FILM COATED ORAL EVERY 6 HOURS PRN
Status: CANCELLED | OUTPATIENT
Start: 2020-03-14

## 2020-03-14 RX ORDER — BENZTROPINE MESYLATE 1 MG/ML
1 INJECTION INTRAMUSCULAR; INTRAVENOUS EVERY 8 HOURS PRN
Status: DISCONTINUED | OUTPATIENT
Start: 2020-03-14 | End: 2020-03-19 | Stop reason: HOSPADM

## 2020-03-14 RX ORDER — CHLORDIAZEPOXIDE HYDROCHLORIDE 25 MG/1
25 CAPSULE, GELATIN COATED ORAL ONCE
Status: COMPLETED | OUTPATIENT
Start: 2020-03-14 | End: 2020-03-14

## 2020-03-14 RX ORDER — THIAMINE MONONITRATE (VIT B1) 100 MG
100 TABLET ORAL DAILY
Status: CANCELLED | OUTPATIENT
Start: 2020-03-14

## 2020-03-14 RX ORDER — DIPHENHYDRAMINE HYDROCHLORIDE 50 MG/ML
25 INJECTION INTRAMUSCULAR; INTRAVENOUS EVERY 6 HOURS PRN
Status: CANCELLED | OUTPATIENT
Start: 2020-03-14

## 2020-03-14 RX ORDER — ACETAMINOPHEN 325 MG/1
650 TABLET ORAL EVERY 6 HOURS PRN
Status: CANCELLED | OUTPATIENT
Start: 2020-03-14

## 2020-03-14 RX ORDER — LORAZEPAM 1 MG/1
2 TABLET ORAL ONCE
Status: DISCONTINUED | OUTPATIENT
Start: 2020-03-14 | End: 2020-03-14

## 2020-03-14 RX ORDER — HALOPERIDOL 5 MG/ML
5 INJECTION INTRAMUSCULAR EVERY 4 HOURS PRN
Status: DISCONTINUED | OUTPATIENT
Start: 2020-03-14 | End: 2020-03-19 | Stop reason: HOSPADM

## 2020-03-14 RX ORDER — RISPERIDONE 1 MG/1
2 TABLET, ORALLY DISINTEGRATING ORAL 2 TIMES DAILY PRN
Status: CANCELLED | OUTPATIENT
Start: 2020-03-14

## 2020-03-14 RX ORDER — TRAZODONE HYDROCHLORIDE 50 MG/1
50 TABLET ORAL EVERY 6 HOURS PRN
Status: DISCONTINUED | OUTPATIENT
Start: 2020-03-14 | End: 2020-03-19 | Stop reason: HOSPADM

## 2020-03-14 RX ORDER — RISPERIDONE 1 MG/1
2 TABLET, ORALLY DISINTEGRATING ORAL 2 TIMES DAILY PRN
Status: DISCONTINUED | OUTPATIENT
Start: 2020-03-14 | End: 2020-03-19 | Stop reason: HOSPADM

## 2020-03-14 RX ORDER — DIPHENHYDRAMINE HYDROCHLORIDE 50 MG/ML
25 INJECTION INTRAMUSCULAR; INTRAVENOUS EVERY 6 HOURS PRN
Status: DISCONTINUED | OUTPATIENT
Start: 2020-03-14 | End: 2020-03-19 | Stop reason: HOSPADM

## 2020-03-14 RX ORDER — HYDROXYZINE HYDROCHLORIDE 25 MG/1
50 TABLET, FILM COATED ORAL EVERY 6 HOURS PRN
Status: CANCELLED | OUTPATIENT
Start: 2020-03-14

## 2020-03-14 RX ORDER — ACETAMINOPHEN 325 MG/1
650 TABLET ORAL EVERY 6 HOURS PRN
Status: DISCONTINUED | OUTPATIENT
Start: 2020-03-14 | End: 2020-03-19 | Stop reason: HOSPADM

## 2020-03-14 RX ORDER — ACETAMINOPHEN 325 MG/1
975 TABLET ORAL EVERY 6 HOURS PRN
Status: CANCELLED | OUTPATIENT
Start: 2020-03-14

## 2020-03-14 RX ORDER — HYDROXYZINE 50 MG/1
50 TABLET, FILM COATED ORAL EVERY 6 HOURS PRN
Status: DISCONTINUED | OUTPATIENT
Start: 2020-03-14 | End: 2020-03-19 | Stop reason: HOSPADM

## 2020-03-14 RX ORDER — THIAMINE MONONITRATE (VIT B1) 100 MG
100 TABLET ORAL DAILY
Status: DISCONTINUED | OUTPATIENT
Start: 2020-03-15 | End: 2020-03-19 | Stop reason: HOSPADM

## 2020-03-14 RX ORDER — FOLIC ACID 1 MG/1
1 TABLET ORAL DAILY
Status: CANCELLED | OUTPATIENT
Start: 2020-03-14

## 2020-03-14 RX ORDER — LORAZEPAM 2 MG/ML
1 INJECTION INTRAMUSCULAR EVERY 4 HOURS PRN
Status: CANCELLED | OUTPATIENT
Start: 2020-03-14

## 2020-03-14 RX ORDER — HALOPERIDOL 5 MG/ML
5 INJECTION INTRAMUSCULAR EVERY 4 HOURS PRN
Status: CANCELLED | OUTPATIENT
Start: 2020-03-14

## 2020-03-14 RX ORDER — TRAZODONE HYDROCHLORIDE 50 MG/1
50 TABLET ORAL
Status: CANCELLED | OUTPATIENT
Start: 2020-03-14

## 2020-03-14 RX ORDER — HYDROXYZINE HYDROCHLORIDE 25 MG/1
25 TABLET, FILM COATED ORAL EVERY 6 HOURS PRN
Status: DISCONTINUED | OUTPATIENT
Start: 2020-03-14 | End: 2020-03-19 | Stop reason: HOSPADM

## 2020-03-14 RX ORDER — BENZTROPINE MESYLATE 1 MG/ML
1 INJECTION INTRAMUSCULAR; INTRAVENOUS EVERY 8 HOURS PRN
Status: CANCELLED | OUTPATIENT
Start: 2020-03-14

## 2020-03-14 RX ORDER — ACETAMINOPHEN 325 MG/1
975 TABLET ORAL EVERY 6 HOURS PRN
Status: DISCONTINUED | OUTPATIENT
Start: 2020-03-14 | End: 2020-03-18

## 2020-03-14 RX ORDER — LORAZEPAM 2 MG/ML
1 INJECTION INTRAMUSCULAR EVERY 4 HOURS PRN
Status: DISCONTINUED | OUTPATIENT
Start: 2020-03-14 | End: 2020-03-19 | Stop reason: HOSPADM

## 2020-03-14 RX ORDER — ACETAMINOPHEN 325 MG/1
650 TABLET ORAL EVERY 4 HOURS PRN
Status: CANCELLED | OUTPATIENT
Start: 2020-03-14

## 2020-03-14 RX ORDER — DIPHENHYDRAMINE HCL 25 MG
25 TABLET ORAL EVERY 6 HOURS PRN
Status: DISCONTINUED | OUTPATIENT
Start: 2020-03-14 | End: 2020-03-19 | Stop reason: HOSPADM

## 2020-03-14 RX ORDER — RISPERIDONE 1 MG/1
1 TABLET, ORALLY DISINTEGRATING ORAL EVERY 4 HOURS PRN
Status: CANCELLED | OUTPATIENT
Start: 2020-03-14

## 2020-03-14 RX ORDER — TRAZODONE HYDROCHLORIDE 50 MG/1
50 TABLET ORAL EVERY 6 HOURS PRN
Status: CANCELLED | OUTPATIENT
Start: 2020-03-14

## 2020-03-14 RX ORDER — DIPHENHYDRAMINE HCL 25 MG
25 TABLET ORAL EVERY 6 HOURS PRN
Status: CANCELLED | OUTPATIENT
Start: 2020-03-14

## 2020-03-14 RX ADMIN — TRAZODONE HYDROCHLORIDE 50 MG: 50 TABLET ORAL at 22:22

## 2020-03-14 RX ADMIN — CHLORDIAZEPOXIDE HYDROCHLORIDE 25 MG: 25 CAPSULE ORAL at 06:04

## 2020-03-14 RX ADMIN — CHLORDIAZEPOXIDE HYDROCHLORIDE 25 MG: 25 CAPSULE ORAL at 08:32

## 2020-03-14 RX ADMIN — METOPROLOL TARTRATE 25 MG: 25 TABLET ORAL at 09:27

## 2020-03-14 RX ADMIN — CHLORDIAZEPOXIDE HYDROCHLORIDE 25 MG: 25 CAPSULE ORAL at 14:36

## 2020-03-14 NOTE — ED NOTES
Pt showered at this time  Pts scrubs changed   Pt is cooperative      Jonnathan Cuevas, RN  03/14/20 101

## 2020-03-14 NOTE — ED NOTES
Report called to [facility]; report taken by Nicolle Bernard, MARK ANTHONY  03/14/20 7142

## 2020-03-14 NOTE — EMTALA/ACUTE CARE TRANSFER
[address]  [address] [facility] [address]  Dept: [phone]      EMTALA TRANSFER CONSENT    NAME Jose CONTE 1965                              MRN [id number]    I have been informed of my rights regarding examination, treatment, and transfer   by Dr Murphy Kehr, *    Benefits: Continuity of care    Risks: Potential for delay in receiving treatment, Potential deterioration of medical condition, Increased discomfort during transfer      Consent for Transfer:  I acknowledge that my medical condition has been evaluated and explained to me by the emergency department physician or other qualified medical person and/or my attending physician, who has recommended that I be transferred to the service of  Accepting Physician: DR Corwin Okeefe at [address] Name, [address] : [address]   The above potential benefits of such transfer, the potential risks associated with such transfer, and the probable risks of not being transferred have been explained to me, and I fully understand them  The doctor has explained that, in my case, the benefits of transfer outweigh the risks  I agree to be transferred  I authorize the performance of emergency medical procedures and treatments upon me in both transit and upon arrival at the receiving facility  Additionally, I authorize the release of any and all medical records to the receiving facility and request they be transported with me, if possible  I understand that the safest mode of transportation during a medical emergency is an ambulance and that the Hospital advocates the use of this mode of transport  Risks of traveling to the receiving facility by car, including absence of medical control, life sustaining equipment, such as oxygen, and medical personnel has been explained to me and I fully understand them      ([place] BELOW)  [  ]  I consent to the stated transfer and to be transported by ambulance/helicopter  [  ]  I consent to the stated transfer, but refuse transportation by ambulance and accept full responsibility for my transportation by car  I understand the risks of non-ambulance transfers and I exonerate the Hospital and its staff from any deterioration in my condition that results from this refusal     X___________________________________________    DATE  20  TIME________  Signature of patient or legally responsible individual signing on patient behalf           RELATIONSHIP TO PATIENT_________________________          Provider Certification    NAME Matilde Daniels                                         1965                              MRN [id number]    A medical screening exam was performed on the above named patient  Based on the examination:    Condition Necessitating Transfer The primary encounter diagnosis was Alcohol abuse  Diagnoses of Depression, Suicidal ideation, and Bipolar I disorder, most recent episode depressed, severe without psychotic features ([facility] [address] ) were also pertinent to this visit  Patient Condition: The patient has been stabilized such that within reasonable medical probability, no material deterioration of the patient condition or the condition of the unborn child(sage) is likely to result from the transfer    Reason for Transfer: Level of Care needed not available at this facility    Transfer Requirements: Facility [address], [address] [place] [address]    · Space available and qualified personnel available for treatment as acknowledged by TOMMY Sol[address] ([facility]) [phone]  · Agreed to accept transfer and to provide appropriate medical treatment as acknowledged by       DR Juan Manuel Rojas  · Appropriate medical records of the examination and treatment of the patient are provided at the time of transfer   [address] [facility], [address] _______  · Transfer will be performed by qualified personnel from [facility]   and appropriate transfer equipment as required, including the use of necessary and appropriate life support measures  Provider Certification: I have examined the patient and explained the following risks and benefits of being transferred/refusing transfer to the patient/family:  General risk, such as traffic hazards, adverse weather conditions, rough terrain or turbulence, possible failure of equipment (including vehicle or aircraft), or consequences of actions of persons outside the control of the transport personnel      Based on these reasonable risks and benefits to the patient and/or the unborn child(sage), and based upon the information available at the time of the patients examination, I certify that the medical benefits reasonably to be expected from the provision of appropriate medical treatments at another medical facility outweigh the increasing risks, if any, to the individuals medical condition, and in the case of labor to the unborn child, from effecting the transfer      X____________________________________________ DATE 03/14/20        TIME_______      ORIGINAL - SEND TO MEDICAL RECORDS   COPY - SEND WITH PATIENT DURING TRANSFER

## 2020-03-14 NOTE — ED NOTES
RN assumes care of the patient at this time  The patient is lying in bed sleeping and appears in no distress at this time       Madison Marie RN  03/14/20 2052

## 2020-03-14 NOTE — ED NOTES
Bed Search       [facility]- denied due to medical needs  [facility]- unable to accommodate medical needs of patient  [facility] at this time   [facility]- unable to accommodate patient at this time   [facility]- denied at this time  [facility]- no beds  [facility]- no beds  [facility]- no beds   [facility]- unable to reach admissions  [facility]- [facility] to [facility] at [facility]   [facility]- [facility]  [facility] - no beds        Patient's bed search has been exhausted at this time    201 sent to intake for in network review

## 2020-03-14 NOTE — ED NOTES
ssumed care of pt at this time; pt resting in room comfortably; pt respirations relaxed and unlabored  Pt being monitored by ED tech Osmel Villalta; pt behavior health observations being recorded on paper ED behavior health observation record       Enrico Henriquez RN  03/14/20 8832

## 2020-03-14 NOTE — ED NOTES
CW started bed search, CW called [facility] and spoke with Silvano Resendiz (admissions) who informed me to fax clinical; Clinical has been faxed       [address] [place] [address]

## 2020-03-14 NOTE — ED NOTES
Pt has Medicare Part A and B (No Pre-Cert Needed) as her primary insurance    CW EVS'd pt and per EVS pt is MA eligible- Fee For Service    Ena Cao Crisis Worker

## 2020-03-14 NOTE — ED NOTES
Patient is accepted at [facility]  Patient is accepted by Dr Sonali Ayon per Juan Jose Garces (intake)     Transportation is arranged with [facility]    Transportation is scheduled for 03/14/2020 @3508   Patient may go to the floor at [address]         Nurse report is to be called to [phone] prior to patient transfer      [address] [place] [address]

## 2020-03-14 NOTE — ED NOTES
CW received a call from Vern (admissions) [address] who informed me that pt has been denied due to being to medical at this time  Bed search to continue      [address] [place] [address]

## 2020-03-14 NOTE — ED NOTES
Pt pacing around room c/o anxiety   Dr Corine Arriola made aware      Diamond Watkins, MARK ANTHONY  03/14/20 9323

## 2020-03-14 NOTE — ED NOTES
Assumed care of patient at this time, pt resting in room comfortably, pt being monitored by pita Vega on a one to one visual observation which is being recorded on ED behavior health observation record       Emily Alarcon RN  03/13/20 0984

## 2020-03-14 NOTE — ED NOTES
The patient ambulated to the bathroom at this time  Her gait was steady and even at this time       Jeannette Bell RN  03/14/20 0679

## 2020-03-14 NOTE — ED NOTES
Patient approached the RN stating, "My anxiety seems to be really flaring up, can I have something for it"       Jennifer Luna, RN  03/14/20 6416

## 2020-03-14 NOTE — ED NOTES
Pt provided with water at this time and meal tray ordered for pt      Keri Paige, MARK ANTHONY  03/14/20 5670

## 2020-03-15 PROBLEM — F10.24 ALCOHOL DEPENDENCE WITH ALCOHOL-INDUCED MOOD DISORDER (HCC): Status: ACTIVE | Noted: 2019-05-25

## 2020-03-15 PROCEDURE — 99222 1ST HOSP IP/OBS MODERATE 55: CPT | Performed by: FAMILY MEDICINE

## 2020-03-15 PROCEDURE — 99221 1ST HOSP IP/OBS SF/LOW 40: CPT | Performed by: PSYCHIATRY & NEUROLOGY

## 2020-03-15 RX ORDER — GABAPENTIN 300 MG/1
600 CAPSULE ORAL 4 TIMES DAILY
Status: DISCONTINUED | OUTPATIENT
Start: 2020-03-15 | End: 2020-03-19 | Stop reason: HOSPADM

## 2020-03-15 RX ORDER — PANTOPRAZOLE SODIUM 40 MG/1
40 TABLET, DELAYED RELEASE ORAL
Status: DISCONTINUED | OUTPATIENT
Start: 2020-03-15 | End: 2020-03-19 | Stop reason: HOSPADM

## 2020-03-15 RX ORDER — SUCRALFATE 1 G/1
1 TABLET ORAL
Status: DISCONTINUED | OUTPATIENT
Start: 2020-03-15 | End: 2020-03-19 | Stop reason: HOSPADM

## 2020-03-15 RX ORDER — BUSPIRONE HYDROCHLORIDE 10 MG/1
10 TABLET ORAL 2 TIMES DAILY
Status: DISCONTINUED | OUTPATIENT
Start: 2020-03-15 | End: 2020-03-19 | Stop reason: HOSPADM

## 2020-03-15 RX ORDER — CLONIDINE HYDROCHLORIDE 0.1 MG/1
0.1 TABLET ORAL 2 TIMES DAILY
Status: DISCONTINUED | OUTPATIENT
Start: 2020-03-15 | End: 2020-03-19 | Stop reason: HOSPADM

## 2020-03-15 RX ORDER — AMLODIPINE BESYLATE 5 MG/1
5 TABLET ORAL DAILY
Status: DISCONTINUED | OUTPATIENT
Start: 2020-03-15 | End: 2020-03-19 | Stop reason: HOSPADM

## 2020-03-15 RX ORDER — FOLIC ACID 1 MG/1
1 TABLET ORAL DAILY
Status: DISCONTINUED | OUTPATIENT
Start: 2020-03-15 | End: 2020-03-15

## 2020-03-15 RX ORDER — LOSARTAN POTASSIUM 50 MG/1
100 TABLET ORAL DAILY
Status: DISCONTINUED | OUTPATIENT
Start: 2020-03-15 | End: 2020-03-19 | Stop reason: HOSPADM

## 2020-03-15 RX ORDER — LITHIUM CARBONATE 300 MG/1
300 CAPSULE ORAL EVERY MORNING
Status: DISCONTINUED | OUTPATIENT
Start: 2020-03-15 | End: 2020-03-19 | Stop reason: HOSPADM

## 2020-03-15 RX ORDER — NICOTINE 21 MG/24HR
1 PATCH, TRANSDERMAL 24 HOURS TRANSDERMAL DAILY
Status: DISCONTINUED | OUTPATIENT
Start: 2020-03-15 | End: 2020-03-19 | Stop reason: HOSPADM

## 2020-03-15 RX ADMIN — METOPROLOL TARTRATE 12.5 MG: 25 TABLET, FILM COATED ORAL at 21:30

## 2020-03-15 RX ADMIN — LURASIDONE HYDROCHLORIDE 80 MG: 80 TABLET, FILM COATED ORAL at 17:11

## 2020-03-15 RX ADMIN — GABAPENTIN 600 MG: 300 CAPSULE ORAL at 21:25

## 2020-03-15 RX ADMIN — TRAZODONE HYDROCHLORIDE 50 MG: 50 TABLET ORAL at 21:36

## 2020-03-15 RX ADMIN — LITHIUM CARBONATE 300 MG: 300 CAPSULE, GELATIN COATED ORAL at 11:52

## 2020-03-15 RX ADMIN — FOLIC ACID 1 MG: 1 TABLET ORAL at 08:34

## 2020-03-15 RX ADMIN — SUCRALFATE 1 G: 1 TABLET ORAL at 17:12

## 2020-03-15 RX ADMIN — BUSPIRONE HYDROCHLORIDE 10 MG: 10 TABLET ORAL at 12:21

## 2020-03-15 RX ADMIN — LOSARTAN POTASSIUM 100 MG: 50 TABLET, FILM COATED ORAL at 11:52

## 2020-03-15 RX ADMIN — LITHIUM CARBONATE 450 MG: 300 CAPSULE, GELATIN COATED ORAL at 21:26

## 2020-03-15 RX ADMIN — Medication 1 TABLET: at 08:34

## 2020-03-15 RX ADMIN — PANTOPRAZOLE SODIUM 40 MG: 40 TABLET, DELAYED RELEASE ORAL at 11:52

## 2020-03-15 RX ADMIN — CLONIDINE HYDROCHLORIDE 0.1 MG: 0.1 TABLET ORAL at 17:12

## 2020-03-15 RX ADMIN — METOPROLOL TARTRATE 12.5 MG: 25 TABLET, FILM COATED ORAL at 11:12

## 2020-03-15 RX ADMIN — BUSPIRONE HYDROCHLORIDE 10 MG: 10 TABLET ORAL at 17:15

## 2020-03-15 RX ADMIN — CLONIDINE HYDROCHLORIDE 0.1 MG: 0.1 TABLET ORAL at 11:52

## 2020-03-15 RX ADMIN — GABAPENTIN 600 MG: 300 CAPSULE ORAL at 12:21

## 2020-03-15 RX ADMIN — THIAMINE HCL TAB 100 MG 100 MG: 100 TAB at 08:34

## 2020-03-15 RX ADMIN — HYDROXYZINE HYDROCHLORIDE 25 MG: 25 TABLET, FILM COATED ORAL at 08:35

## 2020-03-15 RX ADMIN — NICOTINE 1 PATCH: 21 PATCH, EXTENDED RELEASE TRANSDERMAL at 11:52

## 2020-03-15 RX ADMIN — SUCRALFATE 1 G: 1 TABLET ORAL at 21:25

## 2020-03-15 RX ADMIN — AMLODIPINE BESYLATE 5 MG: 5 TABLET ORAL at 11:12

## 2020-03-15 RX ADMIN — GABAPENTIN 600 MG: 300 CAPSULE ORAL at 17:11

## 2020-03-15 RX ADMIN — SUCRALFATE 1 G: 1 TABLET ORAL at 11:52

## 2020-03-15 NOTE — NURSING NOTE
Trazodone 50 mg was given for anxiety and sleep at 2222  Med was effective- pt was sleeping  CIWA score 0 at 0400

## 2020-03-15 NOTE — PLAN OF CARE
Problem: Risk for Self Injury/Neglect  Goal: Treatment Goal: Remain safe during length of stay, learn and adopt new coping skills, and be free of self-injurious ideation, impulses and acts at the time of discharge  Outcome: Progressing     Problem: Depression  Goal: Treatment Goal: Demonstrate behavioral control of depressive symptoms, verbalize feelings of improved mood/affect, and adopt new coping skills prior to discharge  Outcome: Progressing     Problem: Anxiety  Goal: Anxiety is at manageable level  Description  Interventions:  - Assess and monitor patient's anxiety level  - Monitor for signs and symptoms (heart palpitations, chest pain, shortness of breath, headaches, nausea, feeling jumpy, restlessness, irritable, apprehensive)  - Collaborate with interdisciplinary team and initiate plan and interventions as ordered    - [place] patient to unit/surroundings  - Explain treatment plan  - Encourage participation in care  - Encourage verbalization of concerns/fears  - Identify coping mechanisms  - Assist in developing anxiety-reducing skills  - Administer/offer alternative therapies  - Limit or eliminate stimulants  Outcome: Progressing     Problem: Alteration in Thoughts and Perception  Goal: Treatment Goal: Gain control of psychotic behaviors/thinking, reduce/eliminate presenting symptoms and demonstrate improved reality functioning upon discharge  Outcome: Progressing  Goal: Verbalize thoughts and feelings  Description  Interventions:  - Promote a nonjudgmental and trusting relationship with the patient through active listening and therapeutic communication  - Assess patient's level of functioning, behavior and potential for risk  - Engage patient in 1 on 1 interactions  - Encourage patient to express fears, feelings, frustrations, and discuss symptoms    - [place] patient to reality, help patient recognize reality-based thinking   - Administer medications as ordered and assess for potential side effects  - Provide the patient education related to the signs and symptoms of the illness and desired effects of prescribed medications  Outcome: Progressing  Goal: Refrain from acting on delusional thinking/internal stimuli  Description  Interventions:  - Monitor patient closely, per order   - Utilize least restrictive measures   - Set reasonable limits, give positive feedback for acceptable   - Administer medications as ordered and monitor of potential side effects  Outcome: Progressing  Goal: Agree to be compliant with medication regime, as prescribed and report medication side effects  Description  Interventions:  - Offer appropriate PRN medication and supervise ingestion; conduct AIMS, as needed   Outcome: Progressing  Goal: Attend and participate in unit activities, including therapeutic, recreational, and educational groups  Description  Interventions:  -Encourage Visitation and family involvement in care  Outcome: Progressing  Goal: Recognize dysfunctional thoughts, communicate reality-based thoughts at the time of discharge  Description  Interventions:  - Provide medication and psycho-education to assist patient in compliance and developing insight into his/her illness   Outcome: Progressing  Goal: Complete daily ADLs, including personal hygiene independently, as able  Description  Interventions:  - Observe, teach, and assist patient with ADLS  - Monitor and promote a balance of rest/activity, with adequate nutrition and elimination   Outcome: Progressing     Problem: Ineffective Coping  Goal: Cooperates with admission process  Description  Interventions:   - Complete admission process  Outcome: Progressing  Goal: Identifies ineffective coping skills  Outcome: Progressing  Goal: Identifies healthy coping skills  Outcome: Progressing  Goal: Demonstrates healthy coping skills  Outcome: Progressing  Goal: Patient/Family participate in treatment and DC plans  Description  Interventions:  - Provide therapeutic environment  Outcome: Progressing  Goal: Patient/Family verbalizes awareness of resources  Outcome: Progressing  Goal: Understands least restrictive measures  Description  Interventions:  - Utilize least restrictive behavior  Outcome: Progressing  Goal: Free from restraint events  Description  - Utilize least restrictive measures   - Provide behavioral interventions   - Redirect inappropriate behaviors   Outcome: Progressing     Problem: Risk for Self Injury/Neglect  Goal: Verbalize thoughts and feelings  Description  Interventions:  - Assess and re-assess patient's lethality and potential for self-injury  - Engage patient in 1:1 interactions, daily, for a minimum of 15 minutes  - Encourage patient to express feelings, fears, frustrations, hopes  - Establish rapport/trust with patient   Outcome: Progressing  Goal: Refrain from harming self  Description  Interventions:  - Monitor patient closely, per order  - Develop a trusting relationship  - Supervise medication ingestion, monitor effects and side effects   Outcome: Progressing  Goal: Recognize maladaptive responses and adopt new coping mechanisms  Outcome: Progressing  Goal: Complete daily ADLs, including personal hygiene independently, as able  Description  Interventions:  - Observe, teach, and assist patient with ADLS  - Monitor and promote a balance of rest/activity, with adequate nutrition and elimination  Outcome: Progressing     Problem: Depression  Goal: Verbalize thoughts and feelings  Description  Interventions:  - Assess and re-assess patient's level of risk   - Engage patient in 1:1 interactions, daily, for a minimum of 15 minutes   - Encourage patient to express feelings, fears, frustrations, hopes   Outcome: Progressing  Goal: Refrain from harming self  Description  Interventions:  - Monitor patient closely, per order   - Supervise medication ingestion, monitor effects and side effects   Outcome: Progressing  Goal: Refrain from isolation  Description  Interventions:  - Develop a trusting relationship   - Encourage socialization   Outcome: Progressing  Goal: Refrain from self-neglect  Outcome: Progressing  Goal: Complete daily ADLs, including personal hygiene independently, as able  Description  Interventions:  - Observe, teach, and assist patient with ADLS  -  Monitor and promote a balance of rest/activity, with adequate nutrition and elimination   Outcome: Progressing

## 2020-03-15 NOTE — TREATMENT PLAN
TREATMENT PLAN REVIEW - [address] 48 y o  1965 female MRN: [id number]    [address] Room / Bed: Parminder Ca4/OABHU [id number] Encounter: [id number]          Admit Date/Time:  3/14/2020  7:26 PM    Treatment Team: Attending Provider: Douglas Valle MD; Patient Care Assistant: Marleny Jackson; Registered Nurse: Mayito Fletcher RN    Diagnosis: Principal Problem:    Alcohol dependence with alcohol-induced mood disorder ([facility] [address] ) rule out bipolar disorder  Active Problems:    Generalized anxiety disorder    Essential hypertension    Acquired hypothyroidism    Gastritis    Hypercholesteremia    Tobacco abuse    Leukocytosis      Patient Strengths/Assets: cooperative, motivation for treatment/growth, patient is on a voluntary commitment    Patient Barriers/Limitations: lack of social/family support, limited family ties    Short Term Goals: decrease in depressive symptoms, decrease in anxiety symptoms, decrease in suicidal thoughts    Long Term Goals: improvement in depression, improvement in anxiety, adequate sleep, adequate appetite, adequate oral intake, appropriate interaction with peers, appropriate interaction with family, stable living arrangements upon discharge, establishment of family support upon discharge    Progress Towards Goals: starting psychiatric medications as prescribed    Recommended Treatment: medication management, patient medication education, group therapy, milieu therapy, continued Behavioral Health psychiatric evaluation/assessment process    Treatment Frequency: daily medication monitoring, group and milieu therapy daily, monitoring through interdisciplinary rounds, monitoring through weekly patient care conferences    Expected Discharge Date:  7 days    Discharge Plan: return to previous living arrangement    Treatment Plan Created/Updated By: Douglas Valle MD

## 2020-03-15 NOTE — PLAN OF CARE
Problem: Risk for Self Injury/Neglect  Goal: Treatment Goal: Remain safe during length of stay, learn and adopt new coping skills, and be free of self-injurious ideation, impulses and acts at the time of discharge  Outcome: Progressing     Problem: Depression  Goal: Treatment Goal: Demonstrate behavioral control of depressive symptoms, verbalize feelings of improved mood/affect, and adopt new coping skills prior to discharge  Outcome: Progressing     Problem: Anxiety  Goal: Anxiety is at manageable level  Description  Interventions:  - Assess and monitor patient's anxiety level  - Monitor for signs and symptoms (heart palpitations, chest pain, shortness of breath, headaches, nausea, feeling jumpy, restlessness, irritable, apprehensive)  - Collaborate with interdisciplinary team and initiate plan and interventions as ordered    - [place] patient to unit/surroundings  - Explain treatment plan  - Encourage participation in care  - Encourage verbalization of concerns/fears  - Identify coping mechanisms  - Assist in developing anxiety-reducing skills  - Administer/offer alternative therapies  - Limit or eliminate stimulants  Outcome: Progressing     Problem: Alteration in Thoughts and Perception  Goal: Treatment Goal: Gain control of psychotic behaviors/thinking, reduce/eliminate presenting symptoms and demonstrate improved reality functioning upon discharge  Outcome: Progressing  Goal: Verbalize thoughts and feelings  Description  Interventions:  - Promote a nonjudgmental and trusting relationship with the patient through active listening and therapeutic communication  - Assess patient's level of functioning, behavior and potential for risk  - Engage patient in 1 on 1 interactions  - Encourage patient to express fears, feelings, frustrations, and discuss symptoms    - [place] patient to reality, help patient recognize reality-based thinking   - Administer medications as ordered and assess for potential side effects  - Provide the patient education related to the signs and symptoms of the illness and desired effects of prescribed medications  Outcome: Progressing  Goal: Refrain from acting on delusional thinking/internal stimuli  Description  Interventions:  - Monitor patient closely, per order   - Utilize least restrictive measures   - Set reasonable limits, give positive feedback for acceptable   - Administer medications as ordered and monitor of potential side effects  Outcome: Progressing  Goal: Agree to be compliant with medication regime, as prescribed and report medication side effects  Description  Interventions:  - Offer appropriate PRN medication and supervise ingestion; conduct AIMS, as needed   Outcome: Progressing  Goal: Attend and participate in unit activities, including therapeutic, recreational, and educational groups  Description  Interventions:  -Encourage Visitation and family involvement in care  Outcome: Progressing  Goal: Recognize dysfunctional thoughts, communicate reality-based thoughts at the time of discharge  Description  Interventions:  - Provide medication and psycho-education to assist patient in compliance and developing insight into his/her illness   Outcome: Progressing  Goal: Complete daily ADLs, including personal hygiene independently, as able  Description  Interventions:  - Observe, teach, and assist patient with ADLS  - Monitor and promote a balance of rest/activity, with adequate nutrition and elimination   Outcome: Progressing     Problem: Ineffective Coping  Goal: Cooperates with admission process  Description  Interventions:   - Complete admission process  Outcome: Progressing  Goal: Identifies ineffective coping skills  Outcome: Progressing  Goal: Identifies healthy coping skills  Outcome: Progressing  Goal: Demonstrates healthy coping skills  Outcome: Progressing  Goal: Participates in unit activities  Description  Interventions:  - Provide therapeutic environment   - Provide required programming   - Redirect inappropriate behaviors   Outcome: Progressing  Goal: Patient/Family participate in treatment and DC plans  Description  Interventions:  - Provide therapeutic environment  Outcome: Progressing  Goal: Patient/Family verbalizes awareness of resources  Outcome: Progressing  Goal: Understands least restrictive measures  Description  Interventions:  - Utilize least restrictive behavior  Outcome: Progressing  Goal: Free from restraint events  Description  - Utilize least restrictive measures   - Provide behavioral interventions   - Redirect inappropriate behaviors   Outcome: Progressing     Problem: Risk for Self Injury/Neglect  Goal: Verbalize thoughts and feelings  Description  Interventions:  - Assess and re-assess patient's lethality and potential for self-injury  - Engage patient in 1:1 interactions, daily, for a minimum of 15 minutes  - Encourage patient to express feelings, fears, frustrations, hopes  - Establish rapport/trust with patient   Outcome: Progressing  Goal: Refrain from harming self  Description  Interventions:  - Monitor patient closely, per order  - Develop a trusting relationship  - Supervise medication ingestion, monitor effects and side effects   Outcome: Progressing  Goal: Attend and participate in unit activities, including therapeutic, recreational, and educational groups  Description  Interventions:  - Provide therapeutic and educational activities daily, encourage attendance and participation, and document same in the medical record  - Obtain collateral information, encourage visitation and family involvement in care   Outcome: Progressing  Goal: Recognize maladaptive responses and adopt new coping mechanisms  Outcome: Progressing  Goal: Complete daily ADLs, including personal hygiene independently, as able  Description  Interventions:  - Observe, teach, and assist patient with ADLS  - Monitor and promote a balance of rest/activity, with adequate nutrition and elimination  Outcome: Progressing     Problem: Depression  Goal: Verbalize thoughts and feelings  Description  Interventions:  - Assess and re-assess patient's level of risk   - Engage patient in 1:1 interactions, daily, for a minimum of 15 minutes   - Encourage patient to express feelings, fears, frustrations, hopes   Outcome: Progressing  Goal: Refrain from harming self  Description  Interventions:  - Monitor patient closely, per order   - Supervise medication ingestion, monitor effects and side effects   Outcome: Progressing  Goal: Refrain from isolation  Description  Interventions:  - Develop a trusting relationship   - Encourage socialization   Outcome: Progressing  Goal: Refrain from self-neglect  Outcome: Progressing  Goal: Attend and participate in unit activities, including therapeutic, recreational, and educational groups  Description  Interventions:  - Provide therapeutic and educational activities daily, encourage attendance and participation, and document same in the medical record   Outcome: Progressing  Goal: Complete daily ADLs, including personal hygiene independently, as able  Description  Interventions:  - Observe, teach, and assist patient with ADLS  -  Monitor and promote a balance of rest/activity, with adequate nutrition and elimination   Outcome: Progressing

## 2020-03-15 NOTE — ASSESSMENT & PLAN NOTE
· Drinks 6-7 beers a day - last drink 2 days ago   · Not in withdrawal now   · Placed on ciwa  · Vitamin b100 mg po daily

## 2020-03-15 NOTE — CONSULTS
Consult- Chema Sevilla 1965, 48 y o  female MRN: [id number]    Unit/Bed#: [facility] 797-72 Encounter: [id number]    Primary Care Provider: YONG Connolly   Date and time admitted to hospital: 3/14/2020  7:26 PM      Inpatient consult for Medical Clearance for [facility] patient  Consult performed by: Simona Valente MD  Consult ordered by: Edward Godwin MD          Leukocytosis  Assessment & Plan  · Reactive due to alcohol abuse - trended down , no fevers  cxr normal and no infection evident     Generalized anxiety disorder  Assessment & Plan  · Per psychiatry     Tobacco abuse  Assessment & Plan  · Counseled and provided nrt     Hypercholesteremia  Assessment & Plan  · Not on any medications    Bipolar I disorder, most recent episode depressed, severe without psychotic features ([facility] )  Assessment & Plan  · Labs and ekg reviewed   · Per psychiatry     Alcohol use disorder, moderate, dependence ([facility] )  Assessment & Plan  · Drinks 6-7 beers a day - last drink 2 days ago   · Not in withdrawal now   · Placed on ciwa  · Vitamin b100 mg po daily     Gastritis  Assessment & Plan  · Resume carafate and ppi     Acquired hypothyroidism  Assessment & Plan  · tsh controled  · Not on medications    Essential hypertension  Assessment & Plan  · Controled - patient states she has been taking all her bp meds  · Restart norvasc, metoprolol tartrate and clonidine and losartan with holding parameters             VTE Prophylaxis: Reason for no pharmacologic prophylaxis encourage ambulation   / reason for no mechanical VTE prophylaxis contra in psych      Recommendations for Discharge:  · Follow up with pcp     Counseling / Coordination of Care Time: 1 hour  Greater than 50% of total time spent on patient counseling and coordination of care  Collaboration of Care:  Were Recommendations Directly Discussed with Primary Treatment Team? - Yes     History of Present Illness:    Chema Sevilla is a 48 y o  female who is originally admitted to the [address] [place] [address] service due to alcohol abuse and wanting help    We are consulted for medical evaluation and management  Patient si not happy to be on this floor and she does not want to be here  She is denying suicidal ideations  She drinks about 6-7 beers a day and last drink was 2 days ago  Patient denies any chest pain or sob   Denies any abdominal pain and also no diarrhea or dysuria  no palpitations  Had an episode of anxiety in morning  Review of Systems:    Review of Systems   Constitutional: Negative for fatigue and fever  HENT: Negative  Negative for ear pain, rhinorrhea and sore throat  Eyes: Negative  Negative for pain and itching  Respiratory: Negative  Negative for cough, chest tightness, shortness of breath and wheezing  Cardiovascular: Negative  Negative for chest pain, palpitations and leg swelling  Gastrointestinal: Negative  Negative for abdominal pain, diarrhea, nausea and vomiting  Endocrine: Negative for polydipsia, polyphagia and polyuria  Genitourinary: Negative for dysuria and flank pain  Musculoskeletal: Negative  Negative for back pain and myalgias  Skin: Negative  Negative for rash and wound  Neurological: Negative  Negative for dizziness, syncope, speech difficulty, weakness, light-headedness, numbness and headaches  Psychiatric/Behavioral: Negative for agitation, confusion and decreased concentration  Anxiety    All other systems reviewed and are negative        Past Medical and Surgical History:     Past Medical History:   Diagnosis Date    Alcohol abuse     [facility]ism ([facility] )     Anxiety     Bipolar disorder (HCC)     Bowel obstruction (HCC)     Depression     Gastritis     Head injury     Heart palpitations     Hyperlipidemia     Hypertension     Hypothyroidism     PTSD (post-traumatic stress disorder)     Seizure ([facility] )     Sleep difficulties     Suicide attempt [facility])        Past Surgical History:   Procedure Laterality Date    ABDOMINAL SURGERY      APPENDECTOMY      BOWEL RESECTION      CHOLECYSTECTOMY      ESOPHAGOGASTRODUODENOSCOPY N/A 5/18/2018    Procedure: ESOPHAGOGASTRODUODENOSCOPY (EGD) with bx;  Surgeon: Trev Nieto DO;  Location: [facility]; Service: Gastroenterology    HYSTERECTOMY      KNEE SURGERY Bilateral        Meds/Allergies:    PTA meds:   Prior to Admission Medications   Prescriptions Last Dose Informant Patient Reported? Taking?    amLODIPine (NORVASC) 5 mg tablet   No No   Sig: Take 1 tablet (5 mg total) by mouth daily   Patient not taking: Reported on 3/13/2020   cloNIDine (CATAPRES) 0 1 mg tablet   No No   Sig: Take 1 tablet (0 1 mg total) by mouth 2 (two) times a day   Patient not taking: Reported on 2/45/2372   folic acid (FOLVITE) 1 mg tablet   No No   Sig: Take 1 tablet (1 mg total) by mouth daily   Patient not taking: Reported on 3/13/2020   gabapentin (NEURONTIN) 300 mg capsule   No No   Sig: Take 2 capsules (600 mg total) by mouth daily at bedtime   Patient not taking: Reported on 3/13/2020   gabapentin (NEURONTIN) 400 mg capsule   No No   Sig: Take 1 capsule (400 mg total) by mouth 3 (three) times a day   Patient not taking: Reported on 3/13/2020   lithium carbonate 150 mg capsule   No No   Sig: Take 3 capsules (450 mg total) by mouth daily at bedtime   Patient not taking: Reported on 3/13/2020   lithium carbonate 300 mg capsule   No No   Sig: Take 1 capsule (300 mg total) by mouth every morning   Patient not taking: Reported on 3/13/2020   losartan (COZAAR) 100 MG tablet   No No   Sig: Take 1 tablet (100 mg total) by mouth daily   Patient not taking: Reported on 3/13/2020   lurasidone (LATUDA) 80 mg tablet   No No   Sig: Take 1 tablet (80 mg total) by mouth daily with dinner   Patient not taking: Reported on 3/13/2020   metoprolol tartrate (LOPRESSOR) 25 mg tablet   No No   Sig: Take 0 5 tablets (12 5 mg total) by mouth every 12 (twelve) hours   Patient not taking: Reported on 3/13/2020   nicotine (NICODERM CQ) 21 mg/24 hr TD 24 hr patch   No No   Sig: Place 1 patch on the skin daily   Patient not taking: Reported on 3/13/2020   pantoprazole (PROTONIX) 40 mg tablet   No No   Sig: Take 1 tablet (40 mg total) by mouth daily in the early morning   Patient not taking: Reported on 3/13/2020   sucralfate (CARAFATE) 1 g tablet   No No   Sig: Take 1 tablet (1 g total) by mouth 4 (four) times a day (before meals and at bedtime)   Patient not taking: Reported on 3/13/2020   thiamine 100 MG tablet   No No   Sig: Take 1 tablet (100 mg total) by mouth daily   Patient not taking: Reported on 3/13/2020      Facility-Administered Medications: None       Allergies: Allergies   Allergen Reactions    Latex Rash       Social History:     Marital Status:     Substance Use History:   Social History     Substance and Sexual Activity   Alcohol Use Yes    Alcohol/week: 21 0 standard drinks    Types: 21 Cans of beer per week    Frequency: Patient refused   [place] per session: Patient refused    Binge frequency: Patient refused    Comment: daily, last use today     Social History     Tobacco Use   Smoking Status Current Every Day Smoker    Packs/day: 1 00    Years: 25 00    Pack years: 25 00    Types: Cigarettes   Smokeless Tobacco Never Used     Social History     Substance and Sexual Activity   Drug Use No       Family History:    Family History   Problem Relation Age of Onset    Diabetes Father        Physical Exam:     Vitals:   Blood Pressure: 126/71 (03/15/20 0730)  Pulse: (!) 118 (03/15/20 0730)  Temperature: 97 9 °F (36 6 °C) (03/15/20 0730)  Temp Source: Oral (03/15/20 0730)  Respirations: 20 (03/15/20 0730)  Height: 5' 3" (160 cm) (03/14/20 1946)  Weight - Scale: 91 9 kg (202 lb 9 6 oz) (03/14/20 1946)  SpO2: 96 % (03/15/20 0730)    Physical Exam   Constitutional: She is oriented to person, place, and time  She appears well-developed and well-nourished  HENT:   Head: Normocephalic and atraumatic  Eyes: Pupils are equal, round, and reactive to light  EOM are normal    Neck: Normal range of motion  Cardiovascular: Normal rate, regular rhythm and normal heart sounds  Pulmonary/Chest: Effort normal and breath sounds normal    Abdominal: Soft  Bowel sounds are normal    Musculoskeletal: Normal range of motion  She exhibits no edema  Neurological: She is alert and oriented to person, place, and time  She has normal reflexes  cranial nerve 2-12 are normal   Normal neurological examno tremors    Skin: Skin is warm  Psychiatric: She has a normal mood and affect  Additional Data:     Lab Results: I have personally reviewed pertinent films in PACS    Results from last 7 days   Lab Units 03/14/20  1211   WBC Thousand/uL 11 12*   HEMOGLOBIN g/dL 13 1   HEMATOCRIT % 39 9   PLATELETS Thousands/uL 343   NEUTROS PCT % 69   LYMPHS PCT % 23   MONOS PCT % 8   EOS PCT % 0     Results from last 7 days   Lab Units 03/13/20  2342 03/13/20  1304   SODIUM mmol/L 140 131*   POTASSIUM mmol/L 3 9 4 3   CHLORIDE mmol/L 102 94*   CO2 mmol/L 28 19*   BUN mg/dL 12 9   CREATININE mg/dL 0 90 0 81   ANION GAP mmol/L 10 18*   CALCIUM mg/dL 8 9 9 5   ALBUMIN g/dL  --  4 2   TOTAL BILIRUBIN mg/dL  --  0 24   ALK PHOS U/L  --  113   ALT U/L  --  57   AST U/L  --  28   GLUCOSE RANDOM mg/dL 163* 246*             Lab Results   Component Value Date/Time    HGBA1C 5 4 11/15/2019 05:48 AM    HGBA1C 5 6 05/26/2019 06:26 AM    HGBA1C 5 2 09/01/2018 06:24 AM    HGBA1C 5 5 05/17/2018 05:47 AM     Results from last 7 days   Lab Units 03/13/20  2058   POC GLUCOSE mg/dl 216*           Imaging: I have personally reviewed pertinent films in PACS    No orders to display       EKG, Pathology, and Other Studies Reviewed on Admission:   · EKG: reviewed     ** Please Note: This note has been constructed using a voice recognition system   **

## 2020-03-15 NOTE — ASSESSMENT & PLAN NOTE
· Controled - patient states she has been taking all her bp meds  · Restart norvasc, metoprolol tartrate and clonidine and losartan with holding parameters

## 2020-03-15 NOTE — H&P
Psychiatric Evaluation - Behavioral Health     Identification Data:Bhavana Smith 48 y o  female MRN: [id number]  Unit/Bed#: [facility] 510-89 Encounter: [id number]    Chief Complaint:   Depressed mood and suicidal ideation and wants treatment for alcoholism  History of present illness:    Patient is a 48years old  who presents with suicidal ideation while intoxicated  The patient reports that several days ago, she got into an argument with her son and overdosed on antihypertensive medications and was admitted to ICU for 3 days and from there she was referred for inpatient psychiatric treatment but she did not follow through  She continued to drink heavily and then she had a fall and came to the emergency department and after medical clearance, she was admitted to [facility] since there were no beds on younger adult units  The patient reports insomnia, weight gain and frequent suicidal thoughts and feeling overwhelmed by her inability to stop drinking  She reports that she has been compliant with her medications that she receives from [facility]  These include Latuda and lithium and reports that she has been compliant with pharmacotherapy and also counseling she receives there  She admits to stressors mainly in the form of conflict with her son who lives with her  Psychiatric Review Of Systems:  Change in sleep: yes  Appetite changes: no  Weight changes: yes, gained more than 20 lbs in the past few months  Change in energy/anergy: no  Change in interest/pleasure/anhedonia: no  Somatic symptoms: no  Anxiety/panic: yes  Manic symptoms: no  Guilt feelings:no  Hopeless: no  Self injurious behavior/risky behavior: yes    Historical Information     Past Psychiatric History:      The patient has a history of multiple psychiatric hospitalizations at most [facility]    She carries a diagnosis of bipolar disorder but when asked questions about features of doc or hypomania the only thing that she endorses is  talking a lot sometimes   She has made several suicide attempts by overdose  She does not endorse history of psychotic symptoms  Substance Abuse History:    Patient denies usage of illicit drugs but admits to long history of heavy alcohol use  Recently she has been drinking anywhere from 4 to 6 cans of beer on a daily basis  Denies history of delirium tremens  Family Psychiatric History:   She reports that her mother and her cousin both suffer from depression and have made suicide attempts    Social History:  Developmental:  Uneventful  Education: high school diploma/GED  Marital history: single  Living arrangement, social support: son  Occupational History: on permanent disability  Access to firearms: No    Traumatic History:   Abuse:none is reported  Other Traumatic Events:  None reported    Past Medical History:   Diagnosis Date    Alcohol abuse     Alcoholism ([facility] )     Anxiety     Bipolar disorder ([facility] )     [facility]     Depression     Gastritis     Head injury     Heart palpitations     Hyperlipidemia     Hypertension     Hypothyroidism     PTSD (post-traumatic stress disorder)     Seizure ([facility] )     Sleep difficulties     Suicide attempt [facility])        Medical Review Of Systems:  Pertinent items are noted in HPI  Meds/Allergies   all current active meds have been reviewed  Allergies   Allergen Reactions    Latex Rash     Objective      Mental Status Evaluation:  Appearance:  {Adequate hygiene and grooming and Poor eye contact   Behavior:  calm and cooperative   Speech:   Language Normal rate and Normal volume  No overt abnormality   Mood:  anxious and depressed   Affect:    Thought process blunted and constricted  Goal directed and coherent   Associations: Tightly connected   Thought Content:  Does not verbalize delusional material   Perceptual Disturbances: Denies hallucinations and does not appear to be responding to internal stimuli   Risk Potential: Suicidal Ideations without plan Orientation  Oriented x 3   Memory grossly intact   Attention/Concentration attention span and concentration were age appropriate   Fund of knowledge vocabulary Average   Insight:  limited   Judgment: Good judgment   Gait/Station: normal gait/station and normal balance   Motor Activity: No abnormal movement noted         Lab Results: I have personally reviewed pertinent lab results  Imaging Studies:  No pertinent data  EKG, Pathology, and Other Studies:     Ventricular Rate     Atrial Rate     MT Interval ms 156    QRSD Interval ms 68    QT Interval ms 306    QTC Interval ms 443    P [place] degrees 37    QRS Axis degrees 65    T Wave Axis degrees 3             Sinus tachycardia  Poor anterior R wave progression is noted  When compared with ECG of 05-MAR-2020 09:52,  No significant change was found       Code Status:Full code    Patient Strengths/Assets: cooperative, motivation for treatment/growth, patient is on a voluntary commitment    Patient Barriers/Limitations: limited family ties, limited support system    Assessment/Plan     Principal Problem:    Alcohol dependence with alcohol-induced mood disorder ([facility] ) rule out bipolar disorder  Active Problems:    Generalized anxiety disorder    Essential hypertension    Acquired hypothyroidism    Gastritis    Hypercholesteremia    Tobacco abuse    Leukocytosis    Plan:  Leukocytosis is felt to be related to lithium therapy  We will resume lithium and Latuda for now  We will watch for alcohol withdrawal symptoms  We will use buspirone to alleviate anxiety and increase gabapentin  Risks, benefits and possible side effects of Medications:   Risks, benefits, and possible side effects of medications explained to patient and patient verbalizes understanding

## 2020-03-15 NOTE — NURSING NOTE
Patient received PRN Atarax this morning for anxiety which had limited effectiveness  She denies SI and denies ever having SI  She states that she did not want to go home, because she had beer in the fridge and did not trust herself and was hoping the ER could send her somewhere to help with her alocohol addiction

## 2020-03-16 PROCEDURE — 99232 SBSQ HOSP IP/OBS MODERATE 35: CPT | Performed by: PSYCHIATRY & NEUROLOGY

## 2020-03-16 RX ADMIN — SUCRALFATE 1 G: 1 TABLET ORAL at 16:54

## 2020-03-16 RX ADMIN — NICOTINE POLACRILEX 4 MG: 2 GUM, CHEWING ORAL at 18:58

## 2020-03-16 RX ADMIN — CLONIDINE HYDROCHLORIDE 0.1 MG: 0.1 TABLET ORAL at 08:53

## 2020-03-16 RX ADMIN — LURASIDONE HYDROCHLORIDE 80 MG: 80 TABLET, FILM COATED ORAL at 16:54

## 2020-03-16 RX ADMIN — GABAPENTIN 600 MG: 300 CAPSULE ORAL at 16:56

## 2020-03-16 RX ADMIN — GABAPENTIN 600 MG: 300 CAPSULE ORAL at 21:40

## 2020-03-16 RX ADMIN — SUCRALFATE 1 G: 1 TABLET ORAL at 12:49

## 2020-03-16 RX ADMIN — Medication 1 TABLET: at 08:51

## 2020-03-16 RX ADMIN — PANTOPRAZOLE SODIUM 40 MG: 40 TABLET, DELAYED RELEASE ORAL at 06:32

## 2020-03-16 RX ADMIN — HYDROXYZINE HYDROCHLORIDE 50 MG: 50 TABLET, FILM COATED ORAL at 08:59

## 2020-03-16 RX ADMIN — LITHIUM CARBONATE 450 MG: 300 CAPSULE, GELATIN COATED ORAL at 21:40

## 2020-03-16 RX ADMIN — METOPROLOL TARTRATE 12.5 MG: 25 TABLET, FILM COATED ORAL at 08:51

## 2020-03-16 RX ADMIN — FOLIC ACID 1 MG: 1 TABLET ORAL at 08:51

## 2020-03-16 RX ADMIN — NICOTINE 1 PATCH: 21 PATCH, EXTENDED RELEASE TRANSDERMAL at 08:55

## 2020-03-16 RX ADMIN — BUSPIRONE HYDROCHLORIDE 10 MG: 10 TABLET ORAL at 08:51

## 2020-03-16 RX ADMIN — AMLODIPINE BESYLATE 5 MG: 5 TABLET ORAL at 08:51

## 2020-03-16 RX ADMIN — SUCRALFATE 1 G: 1 TABLET ORAL at 06:32

## 2020-03-16 RX ADMIN — LOSARTAN POTASSIUM 100 MG: 50 TABLET, FILM COATED ORAL at 08:50

## 2020-03-16 RX ADMIN — LITHIUM CARBONATE 300 MG: 300 CAPSULE, GELATIN COATED ORAL at 08:51

## 2020-03-16 RX ADMIN — TRAZODONE HYDROCHLORIDE 50 MG: 50 TABLET ORAL at 22:10

## 2020-03-16 RX ADMIN — BUSPIRONE HYDROCHLORIDE 10 MG: 10 TABLET ORAL at 16:57

## 2020-03-16 RX ADMIN — SUCRALFATE 1 G: 1 TABLET ORAL at 21:41

## 2020-03-16 RX ADMIN — GABAPENTIN 600 MG: 300 CAPSULE ORAL at 08:50

## 2020-03-16 RX ADMIN — HYDROXYZINE HYDROCHLORIDE 50 MG: 50 TABLET, FILM COATED ORAL at 16:09

## 2020-03-16 RX ADMIN — GABAPENTIN 600 MG: 300 CAPSULE ORAL at 12:49

## 2020-03-16 RX ADMIN — ACETAMINOPHEN 650 MG: 325 TABLET ORAL at 16:09

## 2020-03-16 RX ADMIN — THIAMINE HCL TAB 100 MG 100 MG: 100 TAB at 08:51

## 2020-03-16 NOTE — NURSING NOTE
Pt reported feeling better last night- less anxious and less depressed  She was curious about her med and doses, was compliant with care  Pt requested and was given Trazodone 50 mg with HS med  CIWA was 0 within the shift  Pt slept well with no distress noted

## 2020-03-16 NOTE — PLAN OF CARE
Problem: Risk for Self Injury/Neglect  Goal: Treatment Goal: Remain safe during length of stay, learn and adopt new coping skills, and be free of self-injurious ideation, impulses and acts at the time of discharge  Outcome: Progressing     Problem: Depression  Goal: Treatment Goal: Demonstrate behavioral control of depressive symptoms, verbalize feelings of improved mood/affect, and adopt new coping skills prior to discharge  Outcome: Progressing     Problem: Anxiety  Goal: Anxiety is at manageable level  Description  Interventions:  - Assess and monitor patient's anxiety level  - Monitor for signs and symptoms (heart palpitations, chest pain, shortness of breath, headaches, nausea, feeling jumpy, restlessness, irritable, apprehensive)  - Collaborate with interdisciplinary team and initiate plan and interventions as ordered    - [place] patient to unit/surroundings  - Explain treatment plan  - Encourage participation in care  - Encourage verbalization of concerns/fears  - Identify coping mechanisms  - Assist in developing anxiety-reducing skills  - Administer/offer alternative therapies  - Limit or eliminate stimulants  Outcome: Progressing     Problem: Alteration in Thoughts and Perception  Goal: Treatment Goal: Gain control of psychotic behaviors/thinking, reduce/eliminate presenting symptoms and demonstrate improved reality functioning upon discharge  Outcome: Progressing  Goal: Verbalize thoughts and feelings  Description  Interventions:  - Promote a nonjudgmental and trusting relationship with the patient through active listening and therapeutic communication  - Assess patient's level of functioning, behavior and potential for risk  - Engage patient in 1 on 1 interactions  - Encourage patient to express fears, feelings, frustrations, and discuss symptoms    - [place] patient to reality, help patient recognize reality-based thinking   - Administer medications as ordered and assess for potential side effects  - Provide the patient education related to the signs and symptoms of the illness and desired effects of prescribed medications  Outcome: Progressing  Goal: Refrain from acting on delusional thinking/internal stimuli  Description  Interventions:  - Monitor patient closely, per order   - Utilize least restrictive measures   - Set reasonable limits, give positive feedback for acceptable   - Administer medications as ordered and monitor of potential side effects  Outcome: Progressing  Goal: Agree to be compliant with medication regime, as prescribed and report medication side effects  Description  Interventions:  - Offer appropriate PRN medication and supervise ingestion; conduct AIMS, as needed   Outcome: Progressing  Goal: Attend and participate in unit activities, including therapeutic, recreational, and educational groups  Description  Interventions:  -Encourage Visitation and family involvement in care  Outcome: Progressing  Goal: Recognize dysfunctional thoughts, communicate reality-based thoughts at the time of discharge  Description  Interventions:  - Provide medication and psycho-education to assist patient in compliance and developing insight into his/her illness   Outcome: Progressing  Goal: Complete daily ADLs, including personal hygiene independently, as able  Description  Interventions:  - Observe, teach, and assist patient with ADLS  - Monitor and promote a balance of rest/activity, with adequate nutrition and elimination   Outcome: Progressing     Problem: Ineffective Coping  Goal: Cooperates with admission process  Description  Interventions:   - Complete admission process  Outcome: Progressing  Goal: Identifies ineffective coping skills  Outcome: Progressing  Goal: Identifies healthy coping skills  Outcome: Progressing  Goal: Demonstrates healthy coping skills  Outcome: Progressing  Goal: Participates in unit activities  Description  Interventions:  - Provide therapeutic environment   - Provide required programming   - Redirect inappropriate behaviors   Outcome: Progressing  Goal: Patient/Family participate in treatment and DC plans  Description  Interventions:  - Provide therapeutic environment  Outcome: Progressing  Goal: Patient/Family verbalizes awareness of resources  Outcome: Progressing  Goal: Understands least restrictive measures  Description  Interventions:  - Utilize least restrictive behavior  Outcome: Progressing  Goal: Free from restraint events  Description  - Utilize least restrictive measures   - Provide behavioral interventions   - Redirect inappropriate behaviors   Outcome: Progressing     Problem: Risk for Self Injury/Neglect  Goal: Verbalize thoughts and feelings  Description  Interventions:  - Assess and re-assess patient's lethality and potential for self-injury  - Engage patient in 1:1 interactions, daily, for a minimum of 15 minutes  - Encourage patient to express feelings, fears, frustrations, hopes  - Establish rapport/trust with patient   Outcome: Progressing  Goal: Refrain from harming self  Description  Interventions:  - Monitor patient closely, per order  - Develop a trusting relationship  - Supervise medication ingestion, monitor effects and side effects   Outcome: Progressing  Goal: Attend and participate in unit activities, including therapeutic, recreational, and educational groups  Description  Interventions:  - Provide therapeutic and educational activities daily, encourage attendance and participation, and document same in the medical record  - Obtain collateral information, encourage visitation and family involvement in care   Outcome: Progressing  Goal: Recognize maladaptive responses and adopt new coping mechanisms  Outcome: Progressing  Goal: Complete daily ADLs, including personal hygiene independently, as able  Description  Interventions:  - Observe, teach, and assist patient with ADLS  - Monitor and promote a balance of rest/activity, with adequate nutrition and elimination  Outcome: Progressing     Problem: Depression  Goal: Verbalize thoughts and feelings  Description  Interventions:  - Assess and re-assess patient's level of risk   - Engage patient in 1:1 interactions, daily, for a minimum of 15 minutes   - Encourage patient to express feelings, fears, frustrations, hopes   Outcome: Progressing  Goal: Refrain from harming self  Description  Interventions:  - Monitor patient closely, per order   - Supervise medication ingestion, monitor effects and side effects   Outcome: Progressing  Goal: Refrain from isolation  Description  Interventions:  - Develop a trusting relationship   - Encourage socialization   Outcome: Progressing  Goal: Refrain from self-neglect  Outcome: Progressing  Goal: Attend and participate in unit activities, including therapeutic, recreational, and educational groups  Description  Interventions:  - Provide therapeutic and educational activities daily, encourage attendance and participation, and document same in the medical record   Outcome: Progressing  Goal: Complete daily ADLs, including personal hygiene independently, as able  Description  Interventions:  - Observe, teach, and assist patient with ADLS  -  Monitor and promote a balance of rest/activity, with adequate nutrition and elimination   Outcome: Progressing     Problem: DISCHARGE PLANNING  Goal: Discharge to home or other facility with appropriate resources  Description  INTERVENTIONS:  - Identify barriers to discharge w/patient and caregiver  - Arrange for needed discharge resources and transportation as appropriate  - Identify discharge learning needs (meds, wound care, etc )  - Arrange for interpretive services to assist at discharge as needed  - Refer to Case Management Department for coordinating discharge planning if the patient needs post-hospital services based on physician/advanced practitioner order or complex needs related to functional status, cognitive ability, or social support system  Outcome: Progressing

## 2020-03-16 NOTE — PROGRESS NOTES
Progress Note - Behavioral Health   Silver Orozco 48 y o  female MRN: [id number]  Unit/Bed#: [facility] 394-17 Encounter: [id number]    Assessment/Plan   Principal Problem:    Alcohol dependence with alcohol-induced mood disorder ([address] ) rule out bipolar disorder  Active Problems:    Essential hypertension    Acquired hypothyroidism    Gastritis    Hypercholesteremia    Tobacco abuse    Generalized anxiety disorder    Leukocytosis       Continue to promote patient participation in therapeutic milieu   Continue Buspar 10 mg b i d    Continue Clonidine 0 1 mg b i d    Continue Gabapentin 600 mg q i d   Continue Lithium 300 mg q a m  and 400 mg q p m   Continue Latuda 80 mg q p m   Patient rescinded 72 hour notice on 03/16/2020 at approximately 10:00 a m ; tentative discharge on 03/20/2020  Subjective:  Patient evaluated this a m  for continuity of care  No acute distress noted throughout evaluation  Patient consents for safety on inpatient unit  She denies suicidal/homicidal ideation  Patient denies depressed mood, although she endorses mild anxiety/irritability, 3/10  She admits to adherence to medication regimen and denies any adverse effects  Patient admits to appropriate appetite and states she is eating well in addition to sleeping well overnight without nightmares or night terrors  She is amenable to inpatient or outpatient alcohol, since patient admits to problematic drinking secondary to self-medication for worsening anxiety      Current Medications:    Current Facility-Administered Medications:  acetaminophen 650 mg Oral Q6H PRN Ziggy Wallace MD   acetaminophen 650 mg Oral Q4H PRN Ziggy Wallace MD   acetaminophen 975 mg Oral Q6H PRN Ziggy Wallace MD   amLODIPine 5 mg Oral Daily Charmaine Horn MD   benztropine 1 mg Intramuscular Q8H PRN Ziggy Wallace MD   busPIRone 10 mg Oral BID Ziggy Wallace MD   cloNIDine 0 1 mg Oral BID Charmaine Horn MD   hydrOXYzine HCL 25 mg Oral Q6H PRN Miriam Torrez MD   And       diphenhydrAMINE 25 mg Intramuscular Q6H PRN Miriam Torrez MD   traZODone 50 mg Oral Q6H PRN Miriam Torrez MD   And       diphenhydrAMINE 25 mg Oral Q6H PRN Miriam Torrez MD   And       diphenhydrAMINE 25 mg Intramuscular Q6H PRN Miriam Torrez MD   folic acid 1 mg Oral Daily Miriam Torrez MD   gabapentin 600 mg Oral 4x Daily Miriam Torrez MD   haloperidol lactate 5 mg Intramuscular Q4H PRN Miriam Torrez MD   And       LORazepam 1 mg Intramuscular Q4H PRN Miriam Torrez MD   hydrOXYzine HCL 50 mg Oral Q6H PRN Miriam Torrez MD   lithium carbonate 300 mg Oral QAM Miriam Torrez MD   lithium carbonate 450 mg Oral HS Miriam Torrez MD   losartan 100 mg Oral Daily Geri Gambino MD   lurasidone 80 mg Oral Daily With Riddhi Kathleen MD   metoprolol tartrate 12 5 mg Oral Q12H [facility] Geri Gambino MD   multivitamin-minerals 1 tablet Oral Daily Miriam Torrez MD   nicotine 1 patch Transdermal Daily Geri Gambino MD   nicotine polacrilex 4 mg Oral Q2H PRN Miriam Torrez MD   pantoprazole 40 mg Oral Early Morning Geri Gambino MD   risperiDONE 1 mg Oral Q4H PRN Miriam Torrez MD   risperiDONE 2 mg Oral BID PRN Miriam Torrez MD   sucralfate 1 g Oral 4x Daily (AC & HS) Geri Gambino MD   thiamine 100 mg Oral Daily Miriam Torrez MD   traZODone 50 mg Oral HS PRN Miriam Torrez MD       Behavioral Health Medications:   all current active meds have been reviewed, continue current psychiatric medications and current meds:   Current Facility-Administered Medications   Medication Dose Route Frequency    acetaminophen (TYLENOL) tablet 650 mg  650 mg Oral Q6H PRN    acetaminophen (TYLENOL) tablet 650 mg  650 mg Oral Q4H PRN    acetaminophen (TYLENOL) tablet 975 mg  975 mg Oral Q6H PRN    amLODIPine (NORVASC) tablet 5 mg  5 mg Oral Daily    benztropine (COGENTIN) injection 1 mg  1 mg Intramuscular Q8H PRN    busPIRone (BUSPAR) tablet 10 mg 10 mg Oral BID    cloNIDine (CATAPRES) tablet 0 1 mg  0 1 mg Oral BID    hydrOXYzine HCL (ATARAX) tablet 25 mg  25 mg Oral Q6H PRN    And    diphenhydrAMINE (BENADRYL) injection 25 mg  25 mg Intramuscular Q6H PRN    traZODone (DESYREL) tablet 50 mg  50 mg Oral Q6H PRN    And    diphenhydrAMINE (BENADRYL) tablet 25 mg  25 mg Oral Q6H PRN    And    diphenhydrAMINE (BENADRYL) injection 25 mg  25 mg Intramuscular B5F PRN    folic acid (FOLVITE) tablet 1 mg  1 mg Oral Daily    gabapentin (NEURONTIN) capsule 600 mg  600 mg Oral 4x Daily    haloperidol lactate (HALDOL) injection 5 mg  5 mg Intramuscular Q4H PRN    And    LORazepam (ATIVAN) injection 1 mg  1 mg Intramuscular Q4H PRN    hydrOXYzine HCL (ATARAX) tablet 50 mg  50 mg Oral Q6H PRN    lithium carbonate capsule 300 mg  300 mg Oral QAM    lithium carbonate capsule 450 mg  450 mg Oral HS    losartan (COZAAR) tablet 100 mg  100 mg Oral Daily    lurasidone (LATUDA) tablet 80 mg  80 mg Oral Daily With Dinner    metoprolol tartrate (LOPRESSOR) tablet 12 5 mg  12 5 mg Oral Q12H [facility]    multivitamin-minerals (CENTRUM) tablet 1 tablet  1 tablet Oral Daily    nicotine (NICODERM CQ) 21 mg/24 hr TD 24 hr patch 1 patch  1 patch Transdermal Daily    nicotine polacrilex (NICORETTE) gum 4 mg  4 mg Oral Q2H PRN    pantoprazole (PROTONIX) EC tablet 40 mg  40 mg Oral Early Morning    risperiDONE (RisperDAL M-TABS) dispersible tablet 1 mg  1 mg Oral Q4H PRN    risperiDONE (RisperDAL M-TABS) dispersible tablet 2 mg  2 mg Oral BID PRN    sucralfate (CARAFATE) tablet 1 g  1 g Oral 4x Daily (AC & HS)    thiamine (VITAMIN B1) tablet 100 mg  100 mg Oral Daily    traZODone (DESYREL) tablet 50 mg  50 mg Oral HS PRN         Vital signs in last 24 hours:  Temp:  [98 5 °F (36 9 °C)-99 °F (37 2 °C)] 98 9 °F (37 2 °C)  HR:  [] 72  Resp:  [18-20] 19  BP: ()/(55-89) 112/66    Laboratory results:    I have personally reviewed all pertinent laboratory/tests results    Most Recent Labs:   Lab Results   Component Value Date    WBC 11 12 (H) 03/14/2020    RBC 4 18 03/14/2020    HGB 13 1 03/14/2020    HCT 39 9 03/14/2020     03/14/2020    RDW 12 9 03/14/2020    NEUTROABS 7 61 03/14/2020    SODIUM 140 03/13/2020    K 3 9 03/13/2020     03/13/2020    CO2 28 03/13/2020    BUN 12 03/13/2020    CREATININE 0 90 03/13/2020    GLUC 163 (H) 03/13/2020    GLUF 119 (H) 12/18/2019    CALCIUM 8 9 03/13/2020    AST 28 03/13/2020    ALT 57 03/13/2020    ALKPHOS 113 03/13/2020    TP 8 7 (H) 03/13/2020    ALB 4 2 03/13/2020    TBILI 0 24 03/13/2020    CHOLESTEROL 168 02/11/2020    HDL 58 02/11/2020    TRIG 168 (H) 02/11/2020    LDLCALC 76 02/11/2020    NONHDLC 110 02/11/2020    LITHIUM <0 2 (L) 03/13/2020    AMMONIA <9 (L) 02/10/2020    [id number] 2 643 03/03/2020    FREET4 0 94 03/03/2020    PREGSERUM Negative 11/15/2019    RPR Non-Reactive 02/11/2020    HGBA1C 5 4 11/15/2019     11/15/2019     Admission Labs:   Admission on 03/13/2020, Discharged on 03/14/2020   Component Date Value    WBC 03/13/2020 18 41*    RBC 03/13/2020 4 42     Hemoglobin 03/13/2020 14 1     Hematocrit 03/13/2020 41 4     MCV 03/13/2020 94     MCH 03/13/2020 31 9     MCHC 03/13/2020 34 1     RDW 03/13/2020 12 8     MPV 03/13/2020 9 4     Platelets 03/56/2057 429*    nRBC 03/13/2020 0     Neutrophils Relative 03/13/2020 75     Immat GRANS % 03/13/2020 1     Lymphocytes Relative 03/13/2020 17     Monocytes Relative 03/13/2020 7     Eosinophils Relative 03/13/2020 0     Basophils Relative 03/13/2020 0     Neutrophils Absolute 03/13/2020 13 81*    Immature Grans Absolute 03/13/2020 0 17     Lymphocytes Absolute 03/13/2020 3 08     Monocytes Absolute 03/13/2020 1 30*    Eosinophils Absolute 03/13/2020 0 00     Basophils Absolute 03/13/2020 0 05     Sodium 03/13/2020 131*    Potassium 03/13/2020 4 3     Chloride 03/13/2020 94*    CO2 03/13/2020 19*    ANION GAP 03/13/2020 18*    BUN 03/13/2020 9     Creatinine 03/13/2020 0 81     Glucose 03/13/2020 246*    Calcium 03/13/2020 9 5     eGFR 03/13/2020 84     Total Bilirubin 03/13/2020 0 24     Bilirubin, Direct 03/13/2020 0 10     Alkaline Phosphatase 03/13/2020 113     AST 03/13/2020 28     ALT 03/13/2020 57     Total Protein 03/13/2020 8 7*    Albumin 03/13/2020 4 2     Amph/Meth UR 03/13/2020 Negative     Barbiturate Ur 03/13/2020 Negative     Benzodiazepine Urine 03/13/2020 Negative     Cocaine Urine 03/13/2020 Negative     Methadone Urine 03/13/2020 Negative     Opiate Urine 03/13/2020 Negative     PCP Ur 03/13/2020 Negative     THC Urine 03/13/2020 Negative     Color, UA 03/13/2020 yellow     Ethanol Lvl 03/13/2020 118*    Color, UA 03/13/2020 Yellow     Clarity, UA 03/13/2020 Clear     pH, UA 03/13/2020 5 0     Leukocytes, UA 03/13/2020 Trace*    Nitrite, UA 03/13/2020 Negative     Protein, UA 03/13/2020 Negative     Glucose, UA 03/13/2020 100 (1/10%)*    Ketones, UA 03/13/2020 Negative     Urobilinogen, UA 03/13/2020 0 2     Bilirubin, UA 03/13/2020 Negative     Blood, UA 03/13/2020 Trace*    Specific [place], UA 03/13/2020 1 010     RBC, UA 03/13/2020 1-2*    WBC, UA 03/13/2020 4-10*    Epithelial Cells 03/13/2020 Occasional     Bacteria, UA 03/13/2020 Occasional     Lithium Lvl 03/13/2020 <0 2*    Ventricular Rate 03/13/2020 126     Atrial Rate 03/13/2020 126     MA Interval 03/13/2020 156     QRSD Interval 03/13/2020 68     QT Interval 03/13/2020 306     QTC Interval 03/13/2020 443     P Axis 03/13/2020 37     QRS Axis 03/13/2020 65     T Wave Axis 03/13/2020 3     POC Glucose 03/13/2020 216*    Sodium 03/13/2020 140     Potassium 03/13/2020 3 9     Chloride 03/13/2020 102     CO2 03/13/2020 28     ANION GAP 03/13/2020 10     BUN 03/13/2020 12     Creatinine 03/13/2020 0 90     Glucose 03/13/2020 163*    Calcium 03/13/2020 8 9     eGFR 03/13/2020 74     WBC 03/14/2020 11 12*    RBC 03/14/2020 4 18     Hemoglobin 03/14/2020 13 1     Hematocrit 03/14/2020 39 9     MCV 03/14/2020 96     MCH 03/14/2020 31 3     MCHC 03/14/2020 32 8     RDW 03/14/2020 12 9     MPV 03/14/2020 9 4     Platelets 21/85/7048 343     nRBC 03/14/2020 0     Neutrophils Relative 03/14/2020 69     Immat GRANS % 03/14/2020 0     Lymphocytes Relative 03/14/2020 23     Monocytes Relative 03/14/2020 8     Eosinophils Relative 03/14/2020 0     Basophils Relative 03/14/2020 0     Neutrophils Absolute 03/14/2020 7 61     Immature Grans Absolute 03/14/2020 0 05     Lymphocytes Absolute 03/14/2020 2 50     Monocytes Absolute 03/14/2020 0 92     Eosinophils Absolute 03/14/2020 0 01     Basophils Absolute 03/14/2020 0 03        Psychiatric Review of Systems:  Behavior over the last 24 hours:  Slight improvement  Sleep:  Improved  Appetite:  Improved  Medication side effects: No  ROS: no complaints    Mental Status Evaluation:  Appearance:  age appropriate, casually dressed, overweight, piercings and tattooed   Behavior:  Calm and cooperative possessing intermittent eye contact   Speech:  soft   Mood:  anxious, irritable and Dysphoric   Affect:  constricted   Language naming objects and repeating phrases   Thought Process:  concrete   Thought Content:  no presence of overt obsessions or delusions   Perceptual Disturbances: no presence of auditory or visual hallucinations   Risk Potential: Suicidal Ideations none, Homicidal Ideations none and Potential for Aggression No   Sensorium:  person, place, day of week, month of year and year   Cognition:  grossly intact   Consciousness:  alert and awake    Attention: attention span appeared shorter than expected for age   Insight:  limited   Judgment: limited   Intellect    Gait/Station: normal gait/station and normal balance   Motor Activity: no abnormal movements     Memory: Short and long term memory  fair     Progress Toward Goals:  Slight improvement    Recommended Treatment:   See above for assessment and plan       Continue following current medications:   Current Facility-Administered Medications:  acetaminophen 650 mg Oral Q6H PRN Luba Rock MD   acetaminophen 650 mg Oral Q4H PRN Luba Rock MD   acetaminophen 975 mg Oral Q6H PRN Luba Rock, MD   amLODIPine 5 mg Oral Daily Alvin Whiteside MD   benztropine 1 mg Intramuscular Q8H PRN Luba Rock MD   busPIRone 10 mg Oral BID Luba Rock, MD   cloNIDine 0 1 mg Oral BID Alvin Whiteside MD   hydrOXYzine HCL 25 mg Oral Q6H PRN Luba Rock MD   And       diphenhydrAMINE 25 mg Intramuscular Q6H PRN Luba Rock MD   traZODone 50 mg Oral Q6H PRN Luba Rock MD   And       diphenhydrAMINE 25 mg Oral Q6H PRN Luba Rock MD   And       diphenhydrAMINE 25 mg Intramuscular Q6H PRN Luba Rock MD   folic acid 1 mg Oral Daily Luba Rock MD   gabapentin 600 mg Oral 4x Daily Luba Rock MD   haloperidol lactate 5 mg Intramuscular Q4H PRN Luba Rock MD   And       LORazepam 1 mg Intramuscular Q4H PRN Luba Rock MD   hydrOXYzine HCL 50 mg Oral Q6H PRN Luba Rock MD   lithium carbonate 300 mg Oral QAM Luba Rock MD   lithium carbonate 450 mg Oral HS Luba Rock MD   losartan 100 mg Oral Daily Alvin Whiteside MD   lurasidone 80 mg Oral Daily With Hussein Almazan MD   metoprolol tartrate 12 5 mg Oral Q12H [address] Alvin Whiteside MD   multivitamin-minerals 1 tablet Oral Daily Luba Rock MD   nicotine 1 patch Transdermal Daily Alvin Whiteside MD   nicotine polacrilex 4 mg Oral Q2H PRN Luba Rock MD   pantoprazole 40 mg Oral Early Morning Alvin Whiteside MD   risperiDONE 1 mg Oral Q4H PRN Luba Rock MD   risperiDONE 2 mg Oral BID PRN uLba Rock MD   sucralfate 1 g Oral 4x Daily (AC & HS) Alvin Whiteside MD   thiamine 100 mg Oral Daily Luba Sic, MD   traZODone 50 mg Oral HS PRN Luba Rock, MD       Risks, benefits and possible side effects of Medications:   Risks, benefits, and possible side effects of medications explained to patient and patient verbalizes understanding  Risks of medications in pregnancy explained if female patient  Patient verbalizes understanding and agrees to notify her doctor if she becomes pregnant  This note has been constructed using a voice recognition system  There may be translation, syntax,  or grammatical errors  If you have any questions, please contact the dictating provider

## 2020-03-16 NOTE — TREATMENT TEAM
03/16/20 0840   Team Meeting   Meeting Type Daily Rounds   Team Members Present   Team Members Present Physician;Nurse;;Occupational Therapist;Other (Discipline and Name)   Physician Team Member Holter   Nursing Team Member [facility] Management Team Member Jaime Keating   OT Team Member Javier Baumann   Other (Discipline and Name) Wendy Beltran     Reviewed case with team

## 2020-03-16 NOTE — TREATMENT TEAM
03/16/20 1000 03/16/20 1400   Activity/Group Checklist   Group Community meeting Self Esteem   Attendance Did not attend Did not attend   Refused to attend all groups today

## 2020-03-16 NOTE — PROGRESS NOTES
Pt reported moderate anxiety and headache 5/10, atarax and tylenol given  CIWA= 5  Pt eating dinner with no complaints of discomfort  Compliant with scheduled medications  Napping following dinner, reports continued headache 9/10, denies anxiety or additional symptoms  States she would just like to rest  Continue to monitor  CIWA= 4 due to report of headache  Pt reports headache resolved following rest, denies anxiety at bedtime, CIWA= 0  Pt denies all symptoms  Trazodone prn administered for sleep

## 2020-03-16 NOTE — NURSING NOTE
72 hour notice rescinded  Pt is cooperative with care and is medication compliant  Pt is visible in the milieu, out for meals, is social with peers and staff  Pt denies s/s  Pt requested PRN medication for anxiety this morning  Atarax was given at morning med pass  Medication was effective  Will monitor

## 2020-03-16 NOTE — PLAN OF CARE
Problem: DISCHARGE PLANNING  Goal: Discharge to home or other facility with appropriate resources  Description  INTERVENTIONS:  - Identify barriers to discharge w/patient and caregiver  - Arrange for needed discharge resources and transportation as appropriate  - Identify discharge learning needs (meds, wound care, etc )  - Arrange for interpretive services to assist at discharge as needed  - Refer to Case Management Department for coordinating discharge planning if the patient needs post-hospital services based on physician/advanced practitioner order or complex needs related to functional status, cognitive ability, or social support system  Outcome: Progressing     Return home with services

## 2020-03-17 DIAGNOSIS — F31.4 BIPOLAR I DISORDER, MOST RECENT EPISODE DEPRESSED, SEVERE WITHOUT PSYCHOTIC FEATURES (HCC): Chronic | ICD-10-CM

## 2020-03-17 PROCEDURE — 99232 SBSQ HOSP IP/OBS MODERATE 35: CPT | Performed by: PSYCHIATRY & NEUROLOGY

## 2020-03-17 RX ORDER — DOXEPIN HYDROCHLORIDE 50 MG/1
CAPSULE ORAL
Qty: 30 CAPSULE | Refills: 0 | OUTPATIENT
Start: 2020-03-17

## 2020-03-17 RX ORDER — LAMOTRIGINE 25 MG/1
TABLET ORAL
Qty: 60 TABLET | Refills: 0 | OUTPATIENT
Start: 2020-03-17

## 2020-03-17 RX ADMIN — LITHIUM CARBONATE 450 MG: 300 CAPSULE, GELATIN COATED ORAL at 21:21

## 2020-03-17 RX ADMIN — LURASIDONE HYDROCHLORIDE 80 MG: 80 TABLET, FILM COATED ORAL at 17:18

## 2020-03-17 RX ADMIN — GABAPENTIN 600 MG: 300 CAPSULE ORAL at 17:18

## 2020-03-17 RX ADMIN — METOPROLOL TARTRATE 12.5 MG: 25 TABLET, FILM COATED ORAL at 21:24

## 2020-03-17 RX ADMIN — NICOTINE 1 PATCH: 21 PATCH, EXTENDED RELEASE TRANSDERMAL at 09:32

## 2020-03-17 RX ADMIN — SUCRALFATE 1 G: 1 TABLET ORAL at 17:19

## 2020-03-17 RX ADMIN — BUSPIRONE HYDROCHLORIDE 10 MG: 10 TABLET ORAL at 17:19

## 2020-03-17 RX ADMIN — NICOTINE POLACRILEX 4 MG: 2 GUM, CHEWING ORAL at 14:29

## 2020-03-17 RX ADMIN — SUCRALFATE 1 G: 1 TABLET ORAL at 21:21

## 2020-03-17 RX ADMIN — TRAZODONE HYDROCHLORIDE 50 MG: 50 TABLET ORAL at 00:05

## 2020-03-17 RX ADMIN — PANTOPRAZOLE SODIUM 40 MG: 40 TABLET, DELAYED RELEASE ORAL at 06:20

## 2020-03-17 RX ADMIN — METOPROLOL TARTRATE 12.5 MG: 25 TABLET, FILM COATED ORAL at 09:30

## 2020-03-17 RX ADMIN — HYDROXYZINE HYDROCHLORIDE 50 MG: 50 TABLET, FILM COATED ORAL at 14:29

## 2020-03-17 RX ADMIN — FOLIC ACID 1 MG: 1 TABLET ORAL at 09:30

## 2020-03-17 RX ADMIN — Medication 1 TABLET: at 09:30

## 2020-03-17 RX ADMIN — HYDROXYZINE HYDROCHLORIDE 50 MG: 50 TABLET, FILM COATED ORAL at 19:53

## 2020-03-17 RX ADMIN — TRAZODONE HYDROCHLORIDE 50 MG: 50 TABLET ORAL at 21:29

## 2020-03-17 RX ADMIN — CLONIDINE HYDROCHLORIDE 0.1 MG: 0.1 TABLET ORAL at 09:29

## 2020-03-17 RX ADMIN — AMLODIPINE BESYLATE 5 MG: 5 TABLET ORAL at 09:29

## 2020-03-17 RX ADMIN — SUCRALFATE 1 G: 1 TABLET ORAL at 06:20

## 2020-03-17 RX ADMIN — LITHIUM CARBONATE 300 MG: 300 CAPSULE, GELATIN COATED ORAL at 09:30

## 2020-03-17 RX ADMIN — NICOTINE POLACRILEX 4 MG: 2 GUM, CHEWING ORAL at 18:29

## 2020-03-17 RX ADMIN — SUCRALFATE 1 G: 1 TABLET ORAL at 12:13

## 2020-03-17 RX ADMIN — GABAPENTIN 600 MG: 300 CAPSULE ORAL at 09:30

## 2020-03-17 RX ADMIN — GABAPENTIN 600 MG: 300 CAPSULE ORAL at 12:13

## 2020-03-17 RX ADMIN — GABAPENTIN 600 MG: 300 CAPSULE ORAL at 21:21

## 2020-03-17 RX ADMIN — THIAMINE HCL TAB 100 MG 100 MG: 100 TAB at 09:29

## 2020-03-17 RX ADMIN — HYDROXYZINE HYDROCHLORIDE 50 MG: 50 TABLET, FILM COATED ORAL at 01:49

## 2020-03-17 RX ADMIN — LOSARTAN POTASSIUM 100 MG: 50 TABLET, FILM COATED ORAL at 09:29

## 2020-03-17 RX ADMIN — BUSPIRONE HYDROCHLORIDE 10 MG: 10 TABLET ORAL at 09:29

## 2020-03-17 NOTE — PLAN OF CARE
Problem: Risk for Self Injury/Neglect  Goal: Treatment Goal: Remain safe during length of stay, learn and adopt new coping skills, and be free of self-injurious ideation, impulses and acts at the time of discharge  Outcome: Progressing     Problem: Depression  Goal: Treatment Goal: Demonstrate behavioral control of depressive symptoms, verbalize feelings of improved mood/affect, and adopt new coping skills prior to discharge  Outcome: Progressing     Problem: Anxiety  Goal: Anxiety is at manageable level  Description  Interventions:  - Assess and monitor patient's anxiety level  - Monitor for signs and symptoms (heart palpitations, chest pain, shortness of breath, headaches, nausea, feeling jumpy, restlessness, irritable, apprehensive)  - Collaborate with interdisciplinary team and initiate plan and interventions as ordered    - [place] patient to unit/surroundings  - Explain treatment plan  - Encourage participation in care  - Encourage verbalization of concerns/fears  - Identify coping mechanisms  - Assist in developing anxiety-reducing skills  - Administer/offer alternative therapies  - Limit or eliminate stimulants  Outcome: Progressing     Problem: Alteration in Thoughts and Perception  Goal: Treatment Goal: Gain control of psychotic behaviors/thinking, reduce/eliminate presenting symptoms and demonstrate improved reality functioning upon discharge  Outcome: Progressing  Goal: Verbalize thoughts and feelings  Description  Interventions:  - Promote a nonjudgmental and trusting relationship with the patient through active listening and therapeutic communication  - Assess patient's level of functioning, behavior and potential for risk  - Engage patient in 1 on 1 interactions  - Encourage patient to express fears, feelings, frustrations, and discuss symptoms    - [place] patient to reality, help patient recognize reality-based thinking   - Administer medications as ordered and assess for potential side effects  - Provide the patient education related to the signs and symptoms of the illness and desired effects of prescribed medications  Outcome: Progressing  Goal: Refrain from acting on delusional thinking/internal stimuli  Description  Interventions:  - Monitor patient closely, per order   - Utilize least restrictive measures   - Set reasonable limits, give positive feedback for acceptable   - Administer medications as ordered and monitor of potential side effects  Outcome: Progressing  Goal: Agree to be compliant with medication regime, as prescribed and report medication side effects  Description  Interventions:  - Offer appropriate PRN medication and supervise ingestion; conduct AIMS, as needed   Outcome: Progressing  Goal: Attend and participate in unit activities, including therapeutic, recreational, and educational groups  Description  Interventions:  -Encourage Visitation and family involvement in care  Outcome: Progressing  Goal: Recognize dysfunctional thoughts, communicate reality-based thoughts at the time of discharge  Description  Interventions:  - Provide medication and psycho-education to assist patient in compliance and developing insight into his/her illness   Outcome: Progressing  Goal: Complete daily ADLs, including personal hygiene independently, as able  Description  Interventions:  - Observe, teach, and assist patient with ADLS  - Monitor and promote a balance of rest/activity, with adequate nutrition and elimination   Outcome: Progressing     Problem: Ineffective Coping  Goal: Cooperates with admission process  Description  Interventions:   - Complete admission process  Outcome: Progressing  Goal: Identifies ineffective coping skills  Outcome: Progressing  Goal: Identifies healthy coping skills  Outcome: Progressing  Goal: Demonstrates healthy coping skills  Outcome: Progressing  Goal: Participates in unit activities  Description  Interventions:  - Provide therapeutic environment   - Provide required programming   - Redirect inappropriate behaviors   Outcome: Progressing  Goal: Patient/Family participate in treatment and DC plans  Description  Interventions:  - Provide therapeutic environment  Outcome: Progressing  Goal: Patient/Family verbalizes awareness of resources  Outcome: Progressing  Goal: Understands least restrictive measures  Description  Interventions:  - Utilize least restrictive behavior  Outcome: Progressing  Goal: Free from restraint events  Description  - Utilize least restrictive measures   - Provide behavioral interventions   - Redirect inappropriate behaviors   Outcome: Progressing     Problem: Risk for Self Injury/Neglect  Goal: Verbalize thoughts and feelings  Description  Interventions:  - Assess and re-assess patient's lethality and potential for self-injury  - Engage patient in 1:1 interactions, daily, for a minimum of 15 minutes  - Encourage patient to express feelings, fears, frustrations, hopes  - Establish rapport/trust with patient   Outcome: Progressing  Goal: Refrain from harming self  Description  Interventions:  - Monitor patient closely, per order  - Develop a trusting relationship  - Supervise medication ingestion, monitor effects and side effects   Outcome: Progressing  Goal: Attend and participate in unit activities, including therapeutic, recreational, and educational groups  Description  Interventions:  - Provide therapeutic and educational activities daily, encourage attendance and participation, and document same in the medical record  - Obtain collateral information, encourage visitation and family involvement in care   Outcome: Progressing  Goal: Recognize maladaptive responses and adopt new coping mechanisms  Outcome: Progressing  Goal: Complete daily ADLs, including personal hygiene independently, as able  Description  Interventions:  - Observe, teach, and assist patient with ADLS  - Monitor and promote a balance of rest/activity, with adequate nutrition and elimination  Outcome: Progressing     Problem: Depression  Goal: Verbalize thoughts and feelings  Description  Interventions:  - Assess and re-assess patient's level of risk   - Engage patient in 1:1 interactions, daily, for a minimum of 15 minutes   - Encourage patient to express feelings, fears, frustrations, hopes   Outcome: Progressing  Goal: Refrain from harming self  Description  Interventions:  - Monitor patient closely, per order   - Supervise medication ingestion, monitor effects and side effects   Outcome: Progressing  Goal: Refrain from isolation  Description  Interventions:  - Develop a trusting relationship   - Encourage socialization   Outcome: Progressing  Goal: Refrain from self-neglect  Outcome: Progressing  Goal: Attend and participate in unit activities, including therapeutic, recreational, and educational groups  Description  Interventions:  - Provide therapeutic and educational activities daily, encourage attendance and participation, and document same in the medical record   Outcome: Progressing  Goal: Complete daily ADLs, including personal hygiene independently, as able  Description  Interventions:  - Observe, teach, and assist patient with ADLS  -  Monitor and promote a balance of rest/activity, with adequate nutrition and elimination   Outcome: Progressing

## 2020-03-17 NOTE — TREATMENT TEAM
03/17/20 0830   Team Meeting   Meeting Type Daily Rounds   Team Members Present   Team Members Present Physician;Nurse;;Occupational Therapist;Other (Discipline and Name)   Physician Team Member Warren Borges, [address]   Nursing Team Member Barak Lawson   Care Management Team Member Gil Cota   OT Team Member Katelynn Carranza   Other (Discipline and Name) Ulysses Memos     Reviewed case with team  Pt does not want to go to rehab at this time

## 2020-03-17 NOTE — TREATMENT TEAM
Refused to attend all day groups  03/17/20 1000 03/17/20 1100 03/17/20 1400   Activity/Group Checklist   Group Other (Comment)  ( recovery: positive coping) Cognitive therapy Life Skills   Attendance Did not attend Did not attend; Refused Refused   Interactions  --  Other (Comment)  (remained in bed stated that she did not sleep last evening )  --

## 2020-03-17 NOTE — PLAN OF CARE
Problem: DISCHARGE PLANNING  Goal: Discharge to home or other facility with appropriate resources  Description  INTERVENTIONS:  - Identify barriers to discharge w/patient and caregiver  - Arrange for needed discharge resources and transportation as appropriate  - Identify discharge learning needs (meds, wound care, etc )  - Arrange for interpretive services to assist at discharge as needed  - Refer to Case Management Department for coordinating discharge planning if the patient needs post-hospital services based on physician/advanced practitioner order or complex needs related to functional status, cognitive ability, or social support system  Outcome: Progressing     Patient reports positive thinking regarding [facility] D/A treatment  Patient plan for d/c is Friday  Patient continues to be pleasant, calm, and cooperative  CM will continue to follow and provide services as needed

## 2020-03-17 NOTE — OCCUPATIONAL THERAPY NOTE
Occupational Therapy  Shavon Brewer was approached for OT evaluation  She was resting in her bed at the time  She declined to talk, stating that she would talk later  I will attempt to approach her at a later time when she is more willing to engage in the interview process      Luis Canseco, OT

## 2020-03-17 NOTE — PROGRESS NOTES
Progress Note - Behavioral Health   Michele Damon 48 y o  female MRN: [id number]  Unit/Bed#: [facility] 188-67 Encounter: [id number]    Assessment/Plan   Principal Problem:    Alcohol dependence with alcohol-induced mood disorder ([address] ) rule out bipolar disorder  Active Problems:    Essential hypertension    Acquired hypothyroidism    Gastritis    Hypercholesteremia    Tobacco abuse    Generalized anxiety disorder    Leukocytosis       Continue to promote patient participation in therapeutic milieu   Continue BuSpar 10 mg b i d  Margarite Gautier  Continue clonidine 0 1 mg b i d  Margarite Paul  Continue Neurontin 600 mg Q id   Continue Latuda 80 mg q p m  Margarite Paul  Consider initiation of Antabuse for excessive alcohol use   Discontinue CIWA protocol in absence of alcohol withdrawal signs/symptoms   Pending discharge on 03/20/2020  Subjective:  Patient evaluated this a m  for continuity of care  No acute distress noted throughout evaluation  She denies suicidal/homicidal ideation; consents for safety on the inpatient unit  Patient endorses mild anxiety/irritability this a m , 3/10, although she denies depression  Patient admits to adherence to medication regimen and denies any acute adverse effects  Patient admits to poor sleep overnight and states that this is secondary to her anxiety; Atarax effective  She admits to appropriate appetite states she is eating well  Patient is amenable to outpatient rehab in addition to initiation of naltrexone for excessive alcohol use      Current Medications:    Current Facility-Administered Medications:  acetaminophen 650 mg Oral Q6H PRN Miriam Torrez MD   acetaminophen 650 mg Oral Q4H PRN Miriam Torrez MD   acetaminophen 975 mg Oral Q6H PRN Miriam Torrez MD   amLODIPine 5 mg Oral Daily Geri Gambino MD   benztropine 1 mg Intramuscular Q8H PRN Miriam Torrez MD   busPIRone 10 mg Oral BID Miriam Torrez MD   cloNIDine 0 1 mg Oral BID Geri Gambino MD   hydrOXYzine HCL 25 mg Oral Q6H PRN Cl Gauthier MD   And       diphenhydrAMINE 25 mg Intramuscular Q6H PRN Cl Gauthier MD   traZODone 50 mg Oral Q6H PRN Cl Gauthier MD   And       diphenhydrAMINE 25 mg Oral Q6H PRN Cl Gauthier MD   And       diphenhydrAMINE 25 mg Intramuscular Q6H PRN Cl Gauthier MD   folic acid 1 mg Oral Daily Cl Gauthier MD   gabapentin 600 mg Oral 4x Daily Cl Gauthier MD   haloperidol lactate 5 mg Intramuscular Q4H PRN Cl Gauthier MD   And       LORazepam 1 mg Intramuscular Q4H PRN Cl Gauthier MD   hydrOXYzine HCL 50 mg Oral Q6H PRN Cl Gauthier MD   lithium carbonate 300 mg Oral QAM Cl Gauthier MD   lithium carbonate 450 mg Oral HS Cl Gauthier MD   losartan 100 mg Oral Daily Joanne Remy MD   lurasidone 80 mg Oral Daily With Corwin Peck MD   metoprolol tartrate 12 5 mg Oral Q12H [facility] Joanne Remy MD   multivitamin-minerals 1 tablet Oral Daily Cl Gauthier MD   nicotine 1 patch Transdermal Daily Joanne Remy MD   nicotine polacrilex 4 mg Oral Q2H PRN Cl Gauthier MD   pantoprazole 40 mg Oral Early Morning Joanne Remy MD   risperiDONE 1 mg Oral Q4H PRN Cl Gauthier MD   risperiDONE 2 mg Oral BID PRN Cl Gauthier MD   sucralfate 1 g Oral 4x Daily (AC & HS) Joanne Remy MD   thiamine 100 mg Oral Daily Cl Gauthier MD   traZODone 50 mg Oral HS PRN Cl Gauthier MD       Behavioral Health Medications:   all current active meds have been reviewed, continue current psychiatric medications and current meds:   Current Facility-Administered Medications   Medication Dose Route Frequency    acetaminophen (TYLENOL) tablet 650 mg  650 mg Oral Q6H PRN    acetaminophen (TYLENOL) tablet 650 mg  650 mg Oral Q4H PRN    acetaminophen (TYLENOL) tablet 975 mg  975 mg Oral Q6H PRN    amLODIPine (NORVASC) tablet 5 mg  5 mg Oral Daily    benztropine (COGENTIN) injection 1 mg  1 mg Intramuscular Q8H PRN    busPIRone (BUSPAR) tablet 10 mg  10 mg Oral BID    cloNIDine (CATAPRES) tablet 0 1 mg  0 1 mg Oral BID    hydrOXYzine HCL (ATARAX) tablet 25 mg  25 mg Oral Q6H PRN    And    diphenhydrAMINE (BENADRYL) injection 25 mg  25 mg Intramuscular Q6H PRN    traZODone (DESYREL) tablet 50 mg  50 mg Oral Q6H PRN    And    diphenhydrAMINE (BENADRYL) tablet 25 mg  25 mg Oral Q6H PRN    And    diphenhydrAMINE (BENADRYL) injection 25 mg  25 mg Intramuscular E9M PRN    folic acid (FOLVITE) tablet 1 mg  1 mg Oral Daily    gabapentin (NEURONTIN) capsule 600 mg  600 mg Oral 4x Daily    haloperidol lactate (HALDOL) injection 5 mg  5 mg Intramuscular Q4H PRN    And    LORazepam (ATIVAN) injection 1 mg  1 mg Intramuscular Q4H PRN    hydrOXYzine HCL (ATARAX) tablet 50 mg  50 mg Oral Q6H PRN    lithium carbonate capsule 300 mg  300 mg Oral QAM    lithium carbonate capsule 450 mg  450 mg Oral HS    losartan (COZAAR) tablet 100 mg  100 mg Oral Daily    lurasidone (LATUDA) tablet 80 mg  80 mg Oral Daily With Dinner    metoprolol tartrate (LOPRESSOR) tablet 12 5 mg  12 5 mg Oral Q12H [facility]    multivitamin-minerals (CENTRUM) tablet 1 tablet  1 tablet Oral Daily    nicotine (NICODERM CQ) 21 mg/24 hr TD 24 hr patch 1 patch  1 patch Transdermal Daily    nicotine polacrilex (NICORETTE) gum 4 mg  4 mg Oral Q2H PRN    pantoprazole (PROTONIX) EC tablet 40 mg  40 mg Oral Early Morning    risperiDONE (RisperDAL M-TABS) dispersible tablet 1 mg  1 mg Oral Q4H PRN    risperiDONE (RisperDAL M-TABS) dispersible tablet 2 mg  2 mg Oral BID PRN    sucralfate (CARAFATE) tablet 1 g  1 g Oral 4x Daily (AC & HS)    thiamine (VITAMIN B1) tablet 100 mg  100 mg Oral Daily    traZODone (DESYREL) tablet 50 mg  50 mg Oral HS PRN         Vital signs in last 24 hours:  Temp:  [97 7 °F (36 5 °C)-98 8 °F (37 1 °C)] 98 1 °F (36 7 °C)  HR:  [] 101  Resp:  [16-18] 18  BP: (100-134)/(52-76) 134/66    Laboratory results:    I have personally reviewed all pertinent laboratory/tests results    Most Recent Labs:   Lab Results   Component Value Date    WBC 11 12 (H) 03/14/2020    RBC 4 18 03/14/2020    HGB 13 1 03/14/2020    HCT 39 9 03/14/2020     03/14/2020    RDW 12 9 03/14/2020    NEUTROABS 7 61 03/14/2020    SODIUM 140 03/13/2020    K 3 9 03/13/2020     03/13/2020    CO2 28 03/13/2020    BUN 12 03/13/2020    CREATININE 0 90 03/13/2020    GLUC 163 (H) 03/13/2020    GLUF 119 (H) 12/18/2019    CALCIUM 8 9 03/13/2020    AST 28 03/13/2020    ALT 57 03/13/2020    ALKPHOS 113 03/13/2020    TP 8 7 (H) 03/13/2020    ALB 4 2 03/13/2020    TBILI 0 24 03/13/2020    CHOLESTEROL 168 02/11/2020    HDL 58 02/11/2020    TRIG 168 (H) 02/11/2020    LDLCALC 76 02/11/2020    NONHDLC 110 02/11/2020    LITHIUM <0 2 (L) 03/13/2020    AMMONIA <9 (L) 02/10/2020    PQT2HAVQXAJK 2 643 03/03/2020    FREET4 0 94 03/03/2020    PREGSERUM Negative 11/15/2019    RPR Non-Reactive 02/11/2020    HGBA1C 5 4 11/15/2019     11/15/2019     Admission Labs:   Admission on 03/13/2020, Discharged on 03/14/2020   Component Date Value    WBC 03/13/2020 18 41*    RBC 03/13/2020 4 42     Hemoglobin 03/13/2020 14 1     Hematocrit 03/13/2020 41 4     MCV 03/13/2020 94     MCH 03/13/2020 31 9     MCHC 03/13/2020 34 1     RDW 03/13/2020 12 8     MPV 03/13/2020 9 4     Platelets 01/59/5318 429*    nRBC 03/13/2020 0     Neutrophils Relative 03/13/2020 75     Immat GRANS % 03/13/2020 1     Lymphocytes Relative 03/13/2020 17     Monocytes Relative 03/13/2020 7     Eosinophils Relative 03/13/2020 0     Basophils Relative 03/13/2020 0     Neutrophils Absolute 03/13/2020 13 81*    Immature Grans Absolute 03/13/2020 0 17     Lymphocytes Absolute 03/13/2020 3 08     Monocytes Absolute 03/13/2020 1 30*    Eosinophils Absolute 03/13/2020 0 00     Basophils Absolute 03/13/2020 0 05     Sodium 03/13/2020 131*    Potassium 03/13/2020 4 3     Chloride 03/13/2020 94*    CO2 03/13/2020 19*    ANION GAP 03/13/2020 18*    BUN 03/13/2020 9     Creatinine 03/13/2020 0 81     Glucose 03/13/2020 246*    Calcium 03/13/2020 9 5     eGFR 03/13/2020 84     Total Bilirubin 03/13/2020 0 24     Bilirubin, Direct 03/13/2020 0 10     Alkaline Phosphatase 03/13/2020 113     AST 03/13/2020 28     ALT 03/13/2020 57     Total Protein 03/13/2020 8 7*    Albumin 03/13/2020 4 2     Amph/Meth UR 03/13/2020 Negative     Barbiturate Ur 03/13/2020 Negative     Benzodiazepine Urine 03/13/2020 Negative     Cocaine Urine 03/13/2020 Negative     Methadone Urine 03/13/2020 Negative     Opiate Urine 03/13/2020 Negative     PCP Ur 03/13/2020 Negative     THC Urine 03/13/2020 Negative     Color, UA 03/13/2020 yellow     Ethanol Lvl 03/13/2020 118*    Color, UA 03/13/2020 Yellow     Clarity, UA 03/13/2020 Clear     pH, UA 03/13/2020 5 0     Leukocytes, UA 03/13/2020 Trace*    Nitrite, UA 03/13/2020 Negative     Protein, UA 03/13/2020 Negative     Glucose, UA 03/13/2020 100 (1/10%)*    Ketones, UA 03/13/2020 Negative     Urobilinogen, UA 03/13/2020 0 2     Bilirubin, UA 03/13/2020 Negative     Blood, UA 03/13/2020 Trace*    Specific [place], UA 03/13/2020 1 010     RBC, UA 03/13/2020 1-2*    WBC, UA 03/13/2020 4-10*    Epithelial Cells 03/13/2020 Occasional     Bacteria, UA 03/13/2020 Occasional     Lithium Lvl 03/13/2020 <0 2*    Ventricular Rate 03/13/2020 126     Atrial Rate 03/13/2020 126     NH Interval 03/13/2020 156     QRSD Interval 03/13/2020 68     QT Interval 03/13/2020 306     QTC Interval 03/13/2020 443     P Axis 03/13/2020 37     QRS Axis 03/13/2020 65     T Wave Axis 03/13/2020 3     POC Glucose 03/13/2020 216*    Sodium 03/13/2020 140     Potassium 03/13/2020 3 9     Chloride 03/13/2020 102     CO2 03/13/2020 28     ANION GAP 03/13/2020 10     BUN 03/13/2020 12     Creatinine 03/13/2020 0 90     Glucose 03/13/2020 163*    Calcium 03/13/2020 8 9     eGFR 03/13/2020 74     WBC 03/14/2020 11 12*    RBC 03/14/2020 4 18     Hemoglobin 03/14/2020 13 1     Hematocrit 03/14/2020 39 9     MCV 03/14/2020 96     MCH 03/14/2020 31 3     MCHC 03/14/2020 32 8     RDW 03/14/2020 12 9     MPV 03/14/2020 9 4     Platelets 67/69/2635 343     nRBC 03/14/2020 0     Neutrophils Relative 03/14/2020 69     Immat GRANS % 03/14/2020 0     Lymphocytes Relative 03/14/2020 23     Monocytes Relative 03/14/2020 8     Eosinophils Relative 03/14/2020 0     Basophils Relative 03/14/2020 0     Neutrophils Absolute 03/14/2020 7 61     Immature Grans Absolute 03/14/2020 0 05     Lymphocytes Absolute 03/14/2020 2 50     Monocytes Absolute 03/14/2020 0 92     Eosinophils Absolute 03/14/2020 0 01     Basophils Absolute 03/14/2020 0 03        Psychiatric Review of Systems:  Behavior over the last 24 hours:  Slight improvement  Sleep: insomnia  Appetite: normal  Medication side effects: No  ROS: no complaints    Mental Status Evaluation:  Appearance:  age appropriate, casually dressed, overweight, piercings and tattooed   Behavior:  Calm and cooperative possessing intermittent eye contact   Speech:  soft and Regular rate and rhythm regarding speech   Mood:  anxious, irritable and Dysphoric   Affect:  constricted   Language naming objects and repeating phrases   Thought Process:  concrete   Thought Content:  no presence of overt obsessions or delusions   Perceptual Disturbances: no presence of auditory or visual hallucinations   Risk Potential: Suicidal Ideations none, Homicidal Ideations none and Potential for Aggression No   Sensorium:  person, place, time/date and situation   Cognition:  grossly intact   Consciousness:  alert and awake    Attention: attention span appeared shorter than expected for age   Insight:  limited   Judgment: limited   Intellect    Gait/Station: normal gait/station and normal balance   Motor Activity: no abnormal movements     Memory: Short and long term memory fair Progress Toward Goals:  Slight improvement    Recommended Treatment:   See above for assessment and plan       Continue following current medications:     Current Facility-Administered Medications:  acetaminophen 650 mg Oral Q6H PRN Silva Goetz MD   acetaminophen 650 mg Oral Q4H PRN Silva Goetz, MD   acetaminophen 975 mg Oral Q6H PRN Silva Goetz, MD   amLODIPine 5 mg Oral Daily Valery Francois MD   benztropine 1 mg Intramuscular Q8H PRN Silva Goetz MD   busPIRone 10 mg Oral BID Silva Goetz, MD   cloNIDine 0 1 mg Oral BID Valery Francois MD   hydrOXYzine HCL 25 mg Oral Q6H PRN Silva Goetz MD   And       diphenhydrAMINE 25 mg Intramuscular Q6H PRN Silva Goetz MD   traZODone 50 mg Oral Q6H PRN Silva Goetz MD   And       diphenhydrAMINE 25 mg Oral Q6H PRN Silva Goetz MD   And       diphenhydrAMINE 25 mg Intramuscular Q6H PRN Silva Goetz MD   folic acid 1 mg Oral Daily Silva Goetz, MD   gabapentin 600 mg Oral 4x Daily Silva Goetz MD   haloperidol lactate 5 mg Intramuscular Q4H PRN Silva Goetz MD   And       LORazepam 1 mg Intramuscular Q4H PRN Silva Goetz MD   hydrOXYzine HCL 50 mg Oral Q6H PRN Silva Goetz MD   lithium carbonate 300 mg Oral QAM Silva Goetz MD   lithium carbonate 450 mg Oral HS Silva Goetz MD   losartan 100 mg Oral Daily Valery Francois MD   lurasidone 80 mg Oral Daily With Tony Coe MD   metoprolol tartrate 12 5 mg Oral Q12H [address] Valery Francois MD   multivitamin-minerals 1 tablet Oral Daily Silva Goetz MD   nicotine 1 patch Transdermal Daily Valery Francois MD   nicotine polacrilex 4 mg Oral Q2H PRN Silva Goetz MD   pantoprazole 40 mg Oral Early Morning Valery Francois MD   risperiDONE 1 mg Oral Q4H PRN Silva Goetz MD   risperiDONE 2 mg Oral BID PRN Silva Goetz MD   sucralfate 1 g Oral 4x Daily (AC & HS) Valery Francois MD   thiamine 100 mg Oral Daily Silva Goetz MD traZODone 50 mg Oral HS PRN Gianna Gamez MD       Risks, benefits and possible side effects of Medications:   Risks, benefits, and possible side effects of medications explained to patient and patient verbalizes understanding  Risks of medications in pregnancy explained if female patient  Patient verbalizes understanding and agrees to notify her doctor if she becomes pregnant  This note has been constructed using a voice recognition system  There may be translation, syntax,  or grammatical errors  If you have any questions, please contact the dictating provider

## 2020-03-17 NOTE — PROGRESS NOTES
03/17/20 1126   Team Meeting   Meeting Type Tx Team Meeting   Initial Conference Date 03/16/20   Team Members Present   Team Members Present Physician;Nurse;   Physician Team Member [id number] Team Member [id number] Management Team Member June    Patient/Family Present   Patient Present Yes   Patient's Family Present No     OP follow up  D/A?   D/C friday
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
## 2020-03-17 NOTE — NURSING NOTE
Patient requesting medication for anxiety saying she woke up and felt anxious 2/4  Medicated with atarax

## 2020-03-17 NOTE — PROGRESS NOTES
Pt seclusive in room, reports she feels "blah" today  Denies other symptoms  Fair appetite  CIWA= 0  Pt reports moderate anxiety in evening, administered atarax prn  Pt napping in room, medication effective  CIWA= 0 when reassessed  Requested trazodone prn sleep  Will monitor

## 2020-03-17 NOTE — NURSING NOTE
Pt is medication compliant and cooperative  Pt is out for meals but resorts back to her room stating she did not sleep last night  Report was given by previous shift that the patient did not sleep well  Pt denies s/s - Will monitor

## 2020-03-18 PROCEDURE — 99232 SBSQ HOSP IP/OBS MODERATE 35: CPT | Performed by: PSYCHIATRY & NEUROLOGY

## 2020-03-18 RX ORDER — IBUPROFEN 600 MG/1
600 TABLET ORAL EVERY 6 HOURS PRN
Status: DISCONTINUED | OUTPATIENT
Start: 2020-03-18 | End: 2020-03-19 | Stop reason: HOSPADM

## 2020-03-18 RX ORDER — NALTREXONE HYDROCHLORIDE 50 MG/1
50 TABLET, FILM COATED ORAL DAILY
Status: DISCONTINUED | OUTPATIENT
Start: 2020-03-18 | End: 2020-03-19 | Stop reason: HOSPADM

## 2020-03-18 RX ADMIN — GABAPENTIN 600 MG: 300 CAPSULE ORAL at 17:10

## 2020-03-18 RX ADMIN — FOLIC ACID 1 MG: 1 TABLET ORAL at 08:46

## 2020-03-18 RX ADMIN — SUCRALFATE 1 G: 1 TABLET ORAL at 06:48

## 2020-03-18 RX ADMIN — CLONIDINE HYDROCHLORIDE 0.1 MG: 0.1 TABLET ORAL at 08:45

## 2020-03-18 RX ADMIN — METOPROLOL TARTRATE 12.5 MG: 25 TABLET, FILM COATED ORAL at 21:12

## 2020-03-18 RX ADMIN — HYDROXYZINE HYDROCHLORIDE 50 MG: 50 TABLET, FILM COATED ORAL at 11:33

## 2020-03-18 RX ADMIN — BUSPIRONE HYDROCHLORIDE 10 MG: 10 TABLET ORAL at 08:46

## 2020-03-18 RX ADMIN — LOSARTAN POTASSIUM 100 MG: 50 TABLET, FILM COATED ORAL at 08:46

## 2020-03-18 RX ADMIN — LITHIUM CARBONATE 450 MG: 300 CAPSULE, GELATIN COATED ORAL at 21:12

## 2020-03-18 RX ADMIN — HYDROXYZINE HYDROCHLORIDE 50 MG: 50 TABLET, FILM COATED ORAL at 19:02

## 2020-03-18 RX ADMIN — CLONIDINE HYDROCHLORIDE 0.1 MG: 0.1 TABLET ORAL at 17:10

## 2020-03-18 RX ADMIN — GABAPENTIN 600 MG: 300 CAPSULE ORAL at 21:12

## 2020-03-18 RX ADMIN — Medication 1 TABLET: at 08:46

## 2020-03-18 RX ADMIN — SUCRALFATE 1 G: 1 TABLET ORAL at 11:30

## 2020-03-18 RX ADMIN — BUSPIRONE HYDROCHLORIDE 10 MG: 10 TABLET ORAL at 17:11

## 2020-03-18 RX ADMIN — PANTOPRAZOLE SODIUM 40 MG: 40 TABLET, DELAYED RELEASE ORAL at 06:48

## 2020-03-18 RX ADMIN — LURASIDONE HYDROCHLORIDE 80 MG: 80 TABLET, FILM COATED ORAL at 17:09

## 2020-03-18 RX ADMIN — NICOTINE 1 PATCH: 21 PATCH, EXTENDED RELEASE TRANSDERMAL at 08:46

## 2020-03-18 RX ADMIN — GABAPENTIN 600 MG: 300 CAPSULE ORAL at 11:30

## 2020-03-18 RX ADMIN — GABAPENTIN 600 MG: 300 CAPSULE ORAL at 08:46

## 2020-03-18 RX ADMIN — THIAMINE HCL TAB 100 MG 100 MG: 100 TAB at 08:46

## 2020-03-18 RX ADMIN — IBUPROFEN 600 MG: 600 TABLET ORAL at 14:19

## 2020-03-18 RX ADMIN — SUCRALFATE 1 G: 1 TABLET ORAL at 17:09

## 2020-03-18 RX ADMIN — AMLODIPINE BESYLATE 5 MG: 5 TABLET ORAL at 08:45

## 2020-03-18 RX ADMIN — SUCRALFATE 1 G: 1 TABLET ORAL at 21:12

## 2020-03-18 RX ADMIN — METOPROLOL TARTRATE 12.5 MG: 25 TABLET, FILM COATED ORAL at 08:46

## 2020-03-18 RX ADMIN — NALTREXONE HYDROCHLORIDE 50 MG: 50 TABLET, FILM COATED ORAL at 11:30

## 2020-03-18 RX ADMIN — TRAZODONE HYDROCHLORIDE 50 MG: 50 TABLET ORAL at 21:40

## 2020-03-18 RX ADMIN — ACETAMINOPHEN 650 MG: 325 TABLET ORAL at 09:14

## 2020-03-18 RX ADMIN — LITHIUM CARBONATE 300 MG: 300 CAPSULE, GELATIN COATED ORAL at 08:46

## 2020-03-18 NOTE — NURSING NOTE
Pt c/o anxiety  Atarax 50 mg administered per pt request   Will monitor  Not effective per pt  Pt feels anxious d/t her mouth pain  MD paged

## 2020-03-18 NOTE — TREATMENT TEAM
03/18/20 0851   Team Meeting   Meeting Type Daily Rounds   Team Members Present   Team Members Present Physician;Nurse;; Other (Discipline and Name)   Physician Team Member [address] Team Member [facility] Team Member Jaime Keating   Other (Discipline and Name) Wendy Beltran     Reviewed case with team  Encourage pt to go to groups  D/C tomorrow or Friday

## 2020-03-18 NOTE — PLAN OF CARE
Problem: Risk for Self Injury/Neglect  Goal: Treatment Goal: Remain safe during length of stay, learn and adopt new coping skills, and be free of self-injurious ideation, impulses and acts at the time of discharge  Outcome: Progressing     Problem: Depression  Goal: Treatment Goal: Demonstrate behavioral control of depressive symptoms, verbalize feelings of improved mood/affect, and adopt new coping skills prior to discharge  Outcome: Progressing     Problem: Anxiety  Goal: Anxiety is at manageable level  Description  Interventions:  - Assess and monitor patient's anxiety level  - Monitor for signs and symptoms (heart palpitations, chest pain, shortness of breath, headaches, nausea, feeling jumpy, restlessness, irritable, apprehensive)  - Collaborate with interdisciplinary team and initiate plan and interventions as ordered    - [place] patient to unit/surroundings  - Explain treatment plan  - Encourage participation in care  - Encourage verbalization of concerns/fears  - Identify coping mechanisms  - Assist in developing anxiety-reducing skills  - Administer/offer alternative therapies  - Limit or eliminate stimulants  Outcome: Progressing     Problem: Alteration in Thoughts and Perception  Goal: Treatment Goal: Gain control of psychotic behaviors/thinking, reduce/eliminate presenting symptoms and demonstrate improved reality functioning upon discharge  Outcome: Progressing  Goal: Verbalize thoughts and feelings  Description  Interventions:  - Promote a nonjudgmental and trusting relationship with the patient through active listening and therapeutic communication  - Assess patient's level of functioning, behavior and potential for risk  - Engage patient in 1 on 1 interactions  - Encourage patient to express fears, feelings, frustrations, and discuss symptoms    - [place] patient to reality, help patient recognize reality-based thinking   - Administer medications as ordered and assess for potential side effects  - Provide the patient education related to the signs and symptoms of the illness and desired effects of prescribed medications  Outcome: Progressing  Goal: Refrain from acting on delusional thinking/internal stimuli  Description  Interventions:  - Monitor patient closely, per order   - Utilize least restrictive measures   - Set reasonable limits, give positive feedback for acceptable   - Administer medications as ordered and monitor of potential side effects  Outcome: Progressing  Goal: Agree to be compliant with medication regime, as prescribed and report medication side effects  Description  Interventions:  - Offer appropriate PRN medication and supervise ingestion; conduct AIMS, as needed   Outcome: Progressing  Goal: Attend and participate in unit activities, including therapeutic, recreational, and educational groups  Description  Interventions:  -Encourage Visitation and family involvement in care  Outcome: Progressing  Goal: Recognize dysfunctional thoughts, communicate reality-based thoughts at the time of discharge  Description  Interventions:  - Provide medication and psycho-education to assist patient in compliance and developing insight into his/her illness   Outcome: Progressing  Goal: Complete daily ADLs, including personal hygiene independently, as able  Description  Interventions:  - Observe, teach, and assist patient with ADLS  - Monitor and promote a balance of rest/activity, with adequate nutrition and elimination   Outcome: Progressing     Problem: Ineffective Coping  Goal: Cooperates with admission process  Description  Interventions:   - Complete admission process  Outcome: Progressing  Goal: Identifies ineffective coping skills  Outcome: Progressing  Goal: Identifies healthy coping skills  Outcome: Progressing  Goal: Demonstrates healthy coping skills  Outcome: Progressing  Goal: Participates in unit activities  Description  Interventions:  - Provide therapeutic environment   - Provide required programming   - Redirect inappropriate behaviors   Outcome: Progressing  Goal: Patient/Family participate in treatment and DC plans  Description  Interventions:  - Provide therapeutic environment  Outcome: Progressing  Goal: Patient/Family verbalizes awareness of resources  Outcome: Progressing  Goal: Understands least restrictive measures  Description  Interventions:  - Utilize least restrictive behavior  Outcome: Progressing  Goal: Free from restraint events  Description  - Utilize least restrictive measures   - Provide behavioral interventions   - Redirect inappropriate behaviors   Outcome: Progressing     Problem: Risk for Self Injury/Neglect  Goal: Verbalize thoughts and feelings  Description  Interventions:  - Assess and re-assess patient's lethality and potential for self-injury  - Engage patient in 1:1 interactions, daily, for a minimum of 15 minutes  - Encourage patient to express feelings, fears, frustrations, hopes  - Establish rapport/trust with patient   Outcome: Progressing  Goal: Refrain from harming self  Description  Interventions:  - Monitor patient closely, per order  - Develop a trusting relationship  - Supervise medication ingestion, monitor effects and side effects   Outcome: Progressing  Goal: Attend and participate in unit activities, including therapeutic, recreational, and educational groups  Description  Interventions:  - Provide therapeutic and educational activities daily, encourage attendance and participation, and document same in the medical record  - Obtain collateral information, encourage visitation and family involvement in care   Outcome: Progressing  Goal: Recognize maladaptive responses and adopt new coping mechanisms  Outcome: Progressing  Goal: Complete daily ADLs, including personal hygiene independently, as able  Description  Interventions:  - Observe, teach, and assist patient with ADLS  - Monitor and promote a balance of rest/activity, with adequate nutrition and elimination  Outcome: Progressing     Problem: Depression  Goal: Verbalize thoughts and feelings  Description  Interventions:  - Assess and re-assess patient's level of risk   - Engage patient in 1:1 interactions, daily, for a minimum of 15 minutes   - Encourage patient to express feelings, fears, frustrations, hopes   Outcome: Progressing  Goal: Refrain from harming self  Description  Interventions:  - Monitor patient closely, per order   - Supervise medication ingestion, monitor effects and side effects   Outcome: Progressing  Goal: Refrain from isolation  Description  Interventions:  - Develop a trusting relationship   - Encourage socialization   Outcome: Progressing  Goal: Refrain from self-neglect  Outcome: Progressing  Goal: Attend and participate in unit activities, including therapeutic, recreational, and educational groups  Description  Interventions:  - Provide therapeutic and educational activities daily, encourage attendance and participation, and document same in the medical record   Outcome: Progressing  Goal: Complete daily ADLs, including personal hygiene independently, as able  Description  Interventions:  - Observe, teach, and assist patient with ADLS  -  Monitor and promote a balance of rest/activity, with adequate nutrition and elimination   Outcome: Progressing     Problem: DISCHARGE PLANNING  Goal: Discharge to home or other facility with appropriate resources  Description  INTERVENTIONS:  - Identify barriers to discharge w/patient and caregiver  - Arrange for needed discharge resources and transportation as appropriate  - Identify discharge learning needs (meds, wound care, etc )  - Arrange for interpretive services to assist at discharge as needed  - Refer to Case Management Department for coordinating discharge planning if the patient needs post-hospital services based on physician/advanced practitioner order or complex needs related to functional status, cognitive ability, or social support system  Outcome: Progressing

## 2020-03-18 NOTE — NURSING NOTE
Pt calm and cooperative with care  Pt is medication compliant  CIWA=0  Pt reports mild anxiety but denies all other symptoms at this time  Pt visible in milieu and social among staff and peers  Will continue to monitor

## 2020-03-18 NOTE — TREATMENT TEAM
03/18/20 1000 03/18/20 1100 03/18/20 1400   Activity/Group Checklist   Group Admission/Discharge  (relapse prevention planning) Other (Comment)  (short term problem solving) Life Skills   Attendance Did not attend Did not attend Refused

## 2020-03-18 NOTE — NURSING NOTE
Phone call placed to [facility]  MD made aware of pt c/o of tooth pain and pain medications  Tylenol was administered earlier but ineffective  Awaiting new orders

## 2020-03-18 NOTE — PROGRESS NOTES
Progress Note - Behavioral Health   Derick Hein 48 y o  female MRN: [id number]  Unit/Bed#: [facility] 814-47 Encounter: [id number]    Assessment/Plan   Principal Problem:    Alcohol dependence with alcohol-induced mood disorder ([address] ) rule out bipolar disorder  Active Problems:    Essential hypertension    Acquired hypothyroidism    Gastritis    Hypercholesteremia    Tobacco abuse    Generalized anxiety disorder    Leukocytosis       Continue to promote patient participation in therapeutic milieu   Continue BuSpar 10 mg b i d  Larri Zeinab  Continue Clonidine 0 1 mg b i d  Larri [place]  Continue Neurontin 600 mg q i d  Larri [place]  Continue Latuda 80 mg q p m  Larri [place]  Continue Lithium 300 mg q a m  And 450 mg q h s   Tentative discharge on 03/19/2020; patient is agreeable to trial of Antabuse to assist in refraining from alcohol use  Subjective:  Patient evaluated this a m  for continuity of care  No acute distress noted throughout evaluation  She consents for safety on the inpatient unit  She denies suicidal/homicidal ideation  Patient endorses mild anxiety at this a m , 3/10, although she denies depressive mood  She admits to medication regimen adherence and denies any acute adverse effects  Patient admits to appropriate appetite states she is eating well  She states her sleep was improved overnight; p r n  Trazodone effective  Patient is amenable to discharge tomorrow including a trial of Antabuse in addition to outpatient alcohol rehab in an attempt to address problematic drinking      Current Medications:    Current Facility-Administered Medications:  acetaminophen 650 mg Oral Q6H PRN Ammon Kiran MD   acetaminophen 650 mg Oral Q4H PRN Ammon Kiran MD   acetaminophen 975 mg Oral Q6H PRN Ammon Kiran MD   amLODIPine 5 mg Oral Daily Chintan Eckert MD   benztropine 1 mg Intramuscular Q8H PRN Ammon Kiran MD   busPIRone 10 mg Oral BID Ammon Kiran MD   cloNIDine 0 1 mg Oral BID Chintan Eckert MD   hydrOXYzine HCL 25 mg Oral Q6H PRN Laney Bolton MD   And       diphenhydrAMINE 25 mg Intramuscular Q6H PRN Laney Bolton MD   traZODone 50 mg Oral Q6H PRN Laney Bolton MD   And       diphenhydrAMINE 25 mg Oral Q6H PRN Laney Bolton MD   And       diphenhydrAMINE 25 mg Intramuscular Q6H PRN Laney Bolton MD   folic acid 1 mg Oral Daily Laney Bolton MD   gabapentin 600 mg Oral 4x Daily Laney Bolton MD   haloperidol lactate 5 mg Intramuscular Q4H PRN Laney Bolton MD   And       LORazepam 1 mg Intramuscular Q4H PRN Laney Bolton MD   hydrOXYzine HCL 50 mg Oral Q6H PRN Laney Bolton MD   lithium carbonate 300 mg Oral QAM Laney Bolton MD   lithium carbonate 450 mg Oral HS Laney Bolton MD   losartan 100 mg Oral Daily Jeff Blanco MD   lurasidone 80 mg Oral Daily With Evans Coon MD   metoprolol tartrate 12 5 mg Oral Q12H [address] Jeff Blanco MD   multivitamin-minerals 1 tablet Oral Daily Laney Bolton MD   nicotine 1 patch Transdermal Daily Jeff Blanco MD   nicotine polacrilex 4 mg Oral Q2H PRN Laney Bolton MD   pantoprazole 40 mg Oral Early Morning Jeff Blanco MD   risperiDONE 1 mg Oral Q4H PRN Lanye Bolton MD   risperiDONE 2 mg Oral BID PRN Laney Bolton MD   sucralfate 1 g Oral 4x Daily (AC & HS) Jeff Blanco MD   thiamine 100 mg Oral Daily Laney Bolton MD   traZODone 50 mg Oral HS PRN Laney Bolton MD       Behavioral Health Medications:   all current active meds have been reviewed, continue current psychiatric medications and current meds:   Current Facility-Administered Medications   Medication Dose Route Frequency    acetaminophen (TYLENOL) tablet 650 mg  650 mg Oral Q6H PRN    acetaminophen (TYLENOL) tablet 650 mg  650 mg Oral Q4H PRN    acetaminophen (TYLENOL) tablet 975 mg  975 mg Oral Q6H PRN    amLODIPine (NORVASC) tablet 5 mg  5 mg Oral Daily    benztropine (COGENTIN) injection 1 mg  1 mg Intramuscular Q8H PRN    busPIRone (BUSPAR) tablet 10 mg  10 mg Oral BID    cloNIDine (CATAPRES) tablet 0 1 mg  0 1 mg Oral BID    hydrOXYzine HCL (ATARAX) tablet 25 mg  25 mg Oral Q6H PRN    And    diphenhydrAMINE (BENADRYL) injection 25 mg  25 mg Intramuscular Q6H PRN    traZODone (DESYREL) tablet 50 mg  50 mg Oral Q6H PRN    And    diphenhydrAMINE (BENADRYL) tablet 25 mg  25 mg Oral Q6H PRN    And    diphenhydrAMINE (BENADRYL) injection 25 mg  25 mg Intramuscular K2A PRN    folic acid (FOLVITE) tablet 1 mg  1 mg Oral Daily    gabapentin (NEURONTIN) capsule 600 mg  600 mg Oral 4x Daily    haloperidol lactate (HALDOL) injection 5 mg  5 mg Intramuscular Q4H PRN    And    LORazepam (ATIVAN) injection 1 mg  1 mg Intramuscular Q4H PRN    hydrOXYzine HCL (ATARAX) tablet 50 mg  50 mg Oral Q6H PRN    lithium carbonate capsule 300 mg  300 mg Oral QAM    lithium carbonate capsule 450 mg  450 mg Oral HS    losartan (COZAAR) tablet 100 mg  100 mg Oral Daily    lurasidone (LATUDA) tablet 80 mg  80 mg Oral Daily With Dinner    metoprolol tartrate (LOPRESSOR) tablet 12 5 mg  12 5 mg Oral Q12H [address]    multivitamin-minerals (CENTRUM) tablet 1 tablet  1 tablet Oral Daily    nicotine (NICODERM CQ) 21 mg/24 hr TD 24 hr patch 1 patch  1 patch Transdermal Daily    nicotine polacrilex (NICORETTE) gum 4 mg  4 mg Oral Q2H PRN    pantoprazole (PROTONIX) EC tablet 40 mg  40 mg Oral Early Morning    risperiDONE (RisperDAL M-TABS) dispersible tablet 1 mg  1 mg Oral Q4H PRN    risperiDONE (RisperDAL M-TABS) dispersible tablet 2 mg  2 mg Oral BID PRN    sucralfate (CARAFATE) tablet 1 g  1 g Oral 4x Daily (AC & HS)    thiamine (VITAMIN B1) tablet 100 mg  100 mg Oral Daily    traZODone (DESYREL) tablet 50 mg  50 mg Oral HS PRN         Vital signs in last 24 hours:  Temp:  [97 6 °F (36 4 °C)-98 9 °F (37 2 °C)] 98 3 °F (36 8 °C)  HR:  [85-97] 85  Resp:  [16-18] 18  BP: (100-127)/(63-80) 122/71    Laboratory results:    I have personally reviewed all pertinent laboratory/tests results    Most Recent Labs:   Lab Results   Component Value Date    WBC 11 12 (H) 03/14/2020    RBC 4 18 03/14/2020    HGB 13 1 03/14/2020    HCT 39 9 03/14/2020     03/14/2020    RDW 12 9 03/14/2020    NEUTROABS 7 61 03/14/2020    SODIUM 140 03/13/2020    K 3 9 03/13/2020     03/13/2020    CO2 28 03/13/2020    BUN 12 03/13/2020    CREATININE 0 90 03/13/2020    GLUC 163 (H) 03/13/2020    GLUF 119 (H) 12/18/2019    CALCIUM 8 9 03/13/2020    AST 28 03/13/2020    ALT 57 03/13/2020    ALKPHOS 113 03/13/2020    TP 8 7 (H) 03/13/2020    ALB 4 2 03/13/2020    TBILI 0 24 03/13/2020    CHOLESTEROL 168 02/11/2020    HDL 58 02/11/2020    TRIG 168 (H) 02/11/2020    LDLCALC 76 02/11/2020    NONHDLC 110 02/11/2020    LITHIUM <0 2 (L) 03/13/2020    AMMONIA <9 (L) 02/10/2020    [id number] 2 643 03/03/2020    FREET4 0 94 03/03/2020    PREGSERUM Negative 11/15/2019    RPR Non-Reactive 02/11/2020    HGBA1C 5 4 11/15/2019     11/15/2019     Admission Labs:   Admission on 03/13/2020, Discharged on 03/14/2020   Component Date Value    WBC 03/13/2020 18 41*    RBC 03/13/2020 4 42     Hemoglobin 03/13/2020 14 1     Hematocrit 03/13/2020 41 4     MCV 03/13/2020 94     MCH 03/13/2020 31 9     MCHC 03/13/2020 34 1     RDW 03/13/2020 12 8     MPV 03/13/2020 9 4     Platelets 10/95/8657 429*    nRBC 03/13/2020 0     Neutrophils Relative 03/13/2020 75     Immat GRANS % 03/13/2020 1     Lymphocytes Relative 03/13/2020 17     Monocytes Relative 03/13/2020 7     Eosinophils Relative 03/13/2020 0     Basophils Relative 03/13/2020 0     Neutrophils Absolute 03/13/2020 13 81*    Immature Grans Absolute 03/13/2020 0 17     Lymphocytes Absolute 03/13/2020 3 08     Monocytes Absolute 03/13/2020 1 30*    Eosinophils Absolute 03/13/2020 0 00     Basophils Absolute 03/13/2020 0 05     Sodium 03/13/2020 131*    Potassium 03/13/2020 4 3     Chloride 03/13/2020 94*    CO2 03/13/2020 19*    ANION GAP 03/13/2020 18*    BUN 03/13/2020 9     Creatinine 03/13/2020 0 81     Glucose 03/13/2020 246*    Calcium 03/13/2020 9 5     eGFR 03/13/2020 84     Total Bilirubin 03/13/2020 0 24     Bilirubin, Direct 03/13/2020 0 10     Alkaline Phosphatase 03/13/2020 113     AST 03/13/2020 28     ALT 03/13/2020 57     Total Protein 03/13/2020 8 7*    Albumin 03/13/2020 4 2     Amph/Meth UR 03/13/2020 Negative     Barbiturate Ur 03/13/2020 Negative     Benzodiazepine Urine 03/13/2020 Negative     Cocaine Urine 03/13/2020 Negative     Methadone Urine 03/13/2020 Negative     Opiate Urine 03/13/2020 Negative     PCP Ur 03/13/2020 Negative     THC Urine 03/13/2020 Negative     Color, UA 03/13/2020 yellow     Ethanol Lvl 03/13/2020 118*    Color, UA 03/13/2020 Yellow     Clarity, UA 03/13/2020 Clear     pH, UA 03/13/2020 5 0     Leukocytes, UA 03/13/2020 Trace*    Nitrite, UA 03/13/2020 Negative     Protein, UA 03/13/2020 Negative     Glucose, UA 03/13/2020 100 (1/10%)*    Ketones, UA 03/13/2020 Negative     Urobilinogen, UA 03/13/2020 0 2     Bilirubin, UA 03/13/2020 Negative     Blood, UA 03/13/2020 Trace*    Specific [place], UA 03/13/2020 1 010     RBC, UA 03/13/2020 1-2*    WBC, UA 03/13/2020 4-10*    Epithelial Cells 03/13/2020 Occasional     Bacteria, UA 03/13/2020 Occasional     Lithium Lvl 03/13/2020 <0 2*    Ventricular Rate 03/13/2020 126     Atrial Rate 03/13/2020 126     IN Interval 03/13/2020 156     QRSD Interval 03/13/2020 68     QT Interval 03/13/2020 306     QTC Interval 03/13/2020 443     P Axis 03/13/2020 37     QRS Axis 03/13/2020 65     T Wave Axis 03/13/2020 3     POC Glucose 03/13/2020 216*    Sodium 03/13/2020 140     Potassium 03/13/2020 3 9     Chloride 03/13/2020 102     CO2 03/13/2020 28     ANION GAP 03/13/2020 10     BUN 03/13/2020 12     Creatinine 03/13/2020 0 90     Glucose 03/13/2020 163*    Calcium 03/13/2020 8 9     eGFR 03/13/2020 74     WBC 03/14/2020 11 12*    RBC 03/14/2020 4 18     Hemoglobin 03/14/2020 13 1     Hematocrit 03/14/2020 39 9     MCV 03/14/2020 96     MCH 03/14/2020 31 3     MCHC 03/14/2020 32 8     RDW 03/14/2020 12 9     MPV 03/14/2020 9 4     Platelets 81/36/7709 343     nRBC 03/14/2020 0     Neutrophils Relative 03/14/2020 69     Immat GRANS % 03/14/2020 0     Lymphocytes Relative 03/14/2020 23     Monocytes Relative 03/14/2020 8     Eosinophils Relative 03/14/2020 0     Basophils Relative 03/14/2020 0     Neutrophils Absolute 03/14/2020 7 61     Immature Grans Absolute 03/14/2020 0 05     Lymphocytes Absolute 03/14/2020 2 50     Monocytes Absolute 03/14/2020 0 92     Eosinophils Absolute 03/14/2020 0 01     Basophils Absolute 03/14/2020 0 03        Psychiatric Review of Systems:  Behavior over the last 24 hours:  Slight improvement  Sleep:  Improved  Appetite:  Improved  Medication side effects: No  ROS: no complaints    Mental Status Evaluation:  Appearance:  age appropriate, casually dressed, overweight, piercings and tattooed   Behavior:  calm and cooperative possessing intermittent eye contact   Speech:  soft   Mood:  anxious, irritable and Dysphoric   Affect:  constricted   Language naming objects and repeating phrases   Thought Process:  concrete   Thought Content:  no overt delusions or obsessions   Perceptual Disturbances: no presence of auditory or visual hallucinations including response to internal stimuli   Risk Potential: Suicidal Ideations none, Homicidal Ideations none and Potential for Aggression No   Sensorium:  person, place, day of week, month of year and year   Cognition:  grossly intact   Consciousness:  alert and awake    Attention: attention span appeared shorter than expected for age   Insight:  limited   Judgment: limited   Intellect    Gait/Station: normal gait/station and normal balance   Motor Activity: no abnormal movements     Memory: Short and long term memory fair Progress Toward Goals:  Slight improvement    Recommended Treatment:   See above for assessment and plan       Continue following current medications:   Current Facility-Administered Medications:  acetaminophen 650 mg Oral Q6H PRN Cl Gauthier MD   acetaminophen 650 mg Oral Q4H PRN Cl Gauthier MD   acetaminophen 975 mg Oral Q6H PRN Cl Gauthier MD   amLODIPine 5 mg Oral Daily Joanne Remy MD   benztropine 1 mg Intramuscular Q8H PRN Cl Gauthier MD   busPIRone 10 mg Oral BID Cl Gauthier MD   cloNIDine 0 1 mg Oral BID Joanne Remy MD   hydrOXYzine HCL 25 mg Oral Q6H PRN Cl Gauthier MD   And       diphenhydrAMINE 25 mg Intramuscular Q6H PRN Cl Gauthier MD   traZODone 50 mg Oral Q6H PRN Cl Gauthier MD   And       diphenhydrAMINE 25 mg Oral Q6H PRN Cl Gauthier MD   And       diphenhydrAMINE 25 mg Intramuscular Q6H PRN Cl Gauthier MD   folic acid 1 mg Oral Daily Cl Gauthier MD   gabapentin 600 mg Oral 4x Daily Cl Gauthier MD   haloperidol lactate 5 mg Intramuscular Q4H PRN Cl Gauthier MD   And       LORazepam 1 mg Intramuscular Q4H PRN Cl Gauthier MD   hydrOXYzine HCL 50 mg Oral Q6H PRN Cl Gauthier MD   lithium carbonate 300 mg Oral QAM Cl Gauthier MD   lithium carbonate 450 mg Oral HS Cl Gauthier MD   losartan 100 mg Oral Daily Joanne Remy MD   lurasidone 80 mg Oral Daily With Corwin Peck MD   metoprolol tartrate 12 5 mg Oral Q12H [address] Joanne Remy MD   multivitamin-minerals 1 tablet Oral Daily Cl Gauthier MD   nicotine 1 patch Transdermal Daily Joanne Remy MD   nicotine polacrilex 4 mg Oral Q2H PRN Cl Gauthier MD   pantoprazole 40 mg Oral Early Morning Joanne Remy MD   risperiDONE 1 mg Oral Q4H PRN Cl Gauthier MD   risperiDONE 2 mg Oral BID PRN Cl Gauthier MD   sucralfate 1 g Oral 4x Daily (AC & HS) Joanne Remy MD   thiamine 100 mg Oral Daily MD Ted Gonzaleze 50 mg Oral HS PRN Manuel Smith MD       Risks, benefits and possible side effects of Medications:   Risks, benefits, and possible side effects of medications explained to patient and patient verbalizes understanding  This note has been constructed using a voice recognition system  There may be translation, syntax,  or grammatical errors  If you have any questions, please contact the dictating provider

## 2020-03-18 NOTE — PLAN OF CARE
Problem: Risk for Self Injury/Neglect  Goal: Treatment Goal: Remain safe during length of stay, learn and adopt new coping skills, and be free of self-injurious ideation, impulses and acts at the time of discharge  Outcome: Progressing     Problem: Depression  Goal: Treatment Goal: Demonstrate behavioral control of depressive symptoms, verbalize feelings of improved mood/affect, and adopt new coping skills prior to discharge  Outcome: Progressing     Problem: Anxiety  Goal: Anxiety is at manageable level  Description  Interventions:  - Assess and monitor patient's anxiety level  - Monitor for signs and symptoms (heart palpitations, chest pain, shortness of breath, headaches, nausea, feeling jumpy, restlessness, irritable, apprehensive)  - Collaborate with interdisciplinary team and initiate plan and interventions as ordered    - Farrell patient to unit/surroundings  - Explain treatment plan  - Encourage participation in care  - Encourage verbalization of concerns/fears  - Identify coping mechanisms  - Assist in developing anxiety-reducing skills  - Administer/offer alternative therapies  - Limit or eliminate stimulants  Outcome: Progressing     Problem: Alteration in Thoughts and Perception  Goal: Treatment Goal: Gain control of psychotic behaviors/thinking, reduce/eliminate presenting symptoms and demonstrate improved reality functioning upon discharge  Outcome: Progressing  Goal: Verbalize thoughts and feelings  Description  Interventions:  - Promote a nonjudgmental and trusting relationship with the patient through active listening and therapeutic communication  - Assess patient's level of functioning, behavior and potential for risk  - Engage patient in 1 on 1 interactions  - Encourage patient to express fears, feelings, frustrations, and discuss symptoms    - Farrell patient to reality, help patient recognize reality-based thinking   - Administer medications as ordered and assess for potential side effects  - Provide the patient education related to the signs and symptoms of the illness and desired effects of prescribed medications  Outcome: Progressing  Goal: Refrain from acting on delusional thinking/internal stimuli  Description  Interventions:  - Monitor patient closely, per order   - Utilize least restrictive measures   - Set reasonable limits, give positive feedback for acceptable   - Administer medications as ordered and monitor of potential side effects  Outcome: Progressing  Goal: Agree to be compliant with medication regime, as prescribed and report medication side effects  Description  Interventions:  - Offer appropriate PRN medication and supervise ingestion; conduct AIMS, as needed   Outcome: Progressing  Goal: Attend and participate in unit activities, including therapeutic, recreational, and educational groups  Description  Interventions:  -Encourage Visitation and family involvement in care  Outcome: Progressing  Goal: Recognize dysfunctional thoughts, communicate reality-based thoughts at the time of discharge  Description  Interventions:  - Provide medication and psycho-education to assist patient in compliance and developing insight into his/her illness   Outcome: Progressing  Goal: Complete daily ADLs, including personal hygiene independently, as able  Description  Interventions:  - Observe, teach, and assist patient with ADLS  - Monitor and promote a balance of rest/activity, with adequate nutrition and elimination   Outcome: Progressing     Problem: Ineffective Coping  Goal: Cooperates with admission process  Description  Interventions:   - Complete admission process  Outcome: Progressing  Goal: Identifies ineffective coping skills  Outcome: Progressing  Goal: Identifies healthy coping skills  Outcome: Progressing  Goal: Demonstrates healthy coping skills  Outcome: Progressing  Goal: Patient/Family participate in treatment and DC plans  Description  Interventions:  - Provide therapeutic environment  Outcome: Progressing  Goal: Patient/Family verbalizes awareness of resources  Outcome: Progressing  Goal: Understands least restrictive measures  Description  Interventions:  - Utilize least restrictive behavior  Outcome: Progressing  Goal: Free from restraint events  Description  - Utilize least restrictive measures   - Provide behavioral interventions   - Redirect inappropriate behaviors   Outcome: Progressing     Problem: Risk for Self Injury/Neglect  Goal: Verbalize thoughts and feelings  Description  Interventions:  - Assess and re-assess patient's lethality and potential for self-injury  - Engage patient in 1:1 interactions, daily, for a minimum of 15 minutes  - Encourage patient to express feelings, fears, frustrations, hopes  - Establish rapport/trust with patient   Outcome: Progressing  Goal: Refrain from harming self  Description  Interventions:  - Monitor patient closely, per order  - Develop a trusting relationship  - Supervise medication ingestion, monitor effects and side effects   Outcome: Progressing  Goal: Attend and participate in unit activities, including therapeutic, recreational, and educational groups  Description  Interventions:  - Provide therapeutic and educational activities daily, encourage attendance and participation, and document same in the medical record  - Obtain collateral information, encourage visitation and family involvement in care   Outcome: Progressing  Goal: Recognize maladaptive responses and adopt new coping mechanisms  Outcome: Progressing  Goal: Complete daily ADLs, including personal hygiene independently, as able  Description  Interventions:  - Observe, teach, and assist patient with ADLS  - Monitor and promote a balance of rest/activity, with adequate nutrition and elimination  Outcome: Progressing     Problem: Depression  Goal: Verbalize thoughts and feelings  Description  Interventions:  - Assess and re-assess patient's level of risk   - Engage patient in 1:1 interactions, daily, for a minimum of 15 minutes   - Encourage patient to express feelings, fears, frustrations, hopes   Outcome: Progressing  Goal: Refrain from harming self  Description  Interventions:  - Monitor patient closely, per order   - Supervise medication ingestion, monitor effects and side effects   Outcome: Progressing  Goal: Refrain from isolation  Description  Interventions:  - Develop a trusting relationship   - Encourage socialization   Outcome: Progressing  Goal: Refrain from self-neglect  Outcome: Progressing  Goal: Attend and participate in unit activities, including therapeutic, recreational, and educational groups  Description  Interventions:  - Provide therapeutic and educational activities daily, encourage attendance and participation, and document same in the medical record   Outcome: Progressing  Goal: Complete daily ADLs, including personal hygiene independently, as able  Description  Interventions:  - Observe, teach, and assist patient with ADLS  -  Monitor and promote a balance of rest/activity, with adequate nutrition and elimination   Outcome: Progressing

## 2020-03-19 VITALS
HEART RATE: 89 BPM | HEIGHT: 63 IN | BODY MASS INDEX: 35.62 KG/M2 | DIASTOLIC BLOOD PRESSURE: 66 MMHG | OXYGEN SATURATION: 94 % | SYSTOLIC BLOOD PRESSURE: 105 MMHG | RESPIRATION RATE: 20 BRPM | TEMPERATURE: 98.9 F | WEIGHT: 201.06 LBS

## 2020-03-19 DIAGNOSIS — I10 ESSENTIAL HYPERTENSION: ICD-10-CM

## 2020-03-19 DIAGNOSIS — F31.4 BIPOLAR I DISORDER, MOST RECENT EPISODE DEPRESSED, SEVERE WITHOUT PSYCHOTIC FEATURES (HCC): Chronic | ICD-10-CM

## 2020-03-19 PROCEDURE — 99238 HOSP IP/OBS DSCHRG MGMT 30/<: CPT | Performed by: PSYCHIATRY & NEUROLOGY

## 2020-03-19 RX ORDER — PANTOPRAZOLE SODIUM 40 MG/1
40 TABLET, DELAYED RELEASE ORAL
Qty: 30 TABLET | Refills: 0 | Status: SHIPPED | OUTPATIENT
Start: 2020-03-19 | End: 2020-09-07 | Stop reason: HOSPADM

## 2020-03-19 RX ORDER — BUSPIRONE HYDROCHLORIDE 10 MG/1
15 TABLET ORAL 2 TIMES DAILY
Qty: 60 TABLET | Refills: 0 | Status: SHIPPED | OUTPATIENT
Start: 2020-03-19 | End: 2020-08-03 | Stop reason: HOSPADM

## 2020-03-19 RX ORDER — AMLODIPINE BESYLATE 5 MG/1
TABLET ORAL
Qty: 30 TABLET | Refills: 0 | Status: SHIPPED | OUTPATIENT
Start: 2020-03-19 | End: 2020-09-07 | Stop reason: HOSPADM

## 2020-03-19 RX ORDER — CLONIDINE HYDROCHLORIDE 0.1 MG/1
0.1 TABLET ORAL 2 TIMES DAILY
Qty: 60 TABLET | Refills: 0 | Status: SHIPPED | OUTPATIENT
Start: 2020-03-19 | End: 2020-08-03 | Stop reason: HOSPADM

## 2020-03-19 RX ORDER — AMLODIPINE BESYLATE 5 MG/1
5 TABLET ORAL DAILY
Qty: 30 TABLET | Refills: 0 | Status: SHIPPED | OUTPATIENT
Start: 2020-03-19 | End: 2020-03-19

## 2020-03-19 RX ORDER — NICOTINE 21 MG/24HR
1 PATCH, TRANSDERMAL 24 HOURS TRANSDERMAL DAILY
Qty: 28 PATCH | Refills: 0 | Status: SHIPPED | OUTPATIENT
Start: 2020-03-20 | End: 2020-08-03 | Stop reason: HOSPADM

## 2020-03-19 RX ORDER — LITHIUM CARBONATE 150 MG/1
CAPSULE ORAL
Qty: 450 CAPSULE | OUTPATIENT
Start: 2020-03-19

## 2020-03-19 RX ORDER — GABAPENTIN 300 MG/1
600 CAPSULE ORAL 4 TIMES DAILY
Qty: 240 CAPSULE | Refills: 0 | Status: SHIPPED | OUTPATIENT
Start: 2020-03-19 | End: 2020-08-03 | Stop reason: HOSPADM

## 2020-03-19 RX ORDER — LOSARTAN POTASSIUM 100 MG/1
100 TABLET ORAL DAILY
Qty: 30 TABLET | Refills: 0 | Status: SHIPPED | OUTPATIENT
Start: 2020-03-19 | End: 2020-08-03 | Stop reason: HOSPADM

## 2020-03-19 RX ORDER — NALTREXONE HYDROCHLORIDE 50 MG/1
50 TABLET, FILM COATED ORAL DAILY
Qty: 30 TABLET | Refills: 0 | Status: SHIPPED | OUTPATIENT
Start: 2020-03-20 | End: 2020-08-03 | Stop reason: HOSPADM

## 2020-03-19 RX ORDER — SUCRALFATE 1 G/1
1 TABLET ORAL
Qty: 30 TABLET | Refills: 0 | Status: ON HOLD | OUTPATIENT
Start: 2020-03-19 | End: 2020-10-12 | Stop reason: SDUPTHER

## 2020-03-19 RX ORDER — LANOLIN ALCOHOL/MO/W.PET/CERES
100 CREAM (GRAM) TOPICAL DAILY
Qty: 30 TABLET | Refills: 0 | Status: SHIPPED | OUTPATIENT
Start: 2020-03-19 | End: 2020-08-03 | Stop reason: HOSPADM

## 2020-03-19 RX ORDER — LITHIUM CARBONATE 150 MG/1
CAPSULE ORAL
Qty: 150 CAPSULE | Refills: 0 | Status: SHIPPED | OUTPATIENT
Start: 2020-03-19 | End: 2020-08-03 | Stop reason: HOSPADM

## 2020-03-19 RX ORDER — CLONIDINE HYDROCHLORIDE 0.1 MG/1
TABLET ORAL
Qty: 180 TABLET | OUTPATIENT
Start: 2020-03-19

## 2020-03-19 RX ADMIN — BUSPIRONE HYDROCHLORIDE 10 MG: 10 TABLET ORAL at 08:47

## 2020-03-19 RX ADMIN — SUCRALFATE 1 G: 1 TABLET ORAL at 06:01

## 2020-03-19 RX ADMIN — FOLIC ACID 1 MG: 1 TABLET ORAL at 08:47

## 2020-03-19 RX ADMIN — NALTREXONE HYDROCHLORIDE 50 MG: 50 TABLET, FILM COATED ORAL at 08:48

## 2020-03-19 RX ADMIN — ACETAMINOPHEN 650 MG: 325 TABLET ORAL at 06:00

## 2020-03-19 RX ADMIN — Medication 1 TABLET: at 08:48

## 2020-03-19 RX ADMIN — NICOTINE 1 PATCH: 21 PATCH, EXTENDED RELEASE TRANSDERMAL at 08:45

## 2020-03-19 RX ADMIN — PANTOPRAZOLE SODIUM 40 MG: 40 TABLET, DELAYED RELEASE ORAL at 06:01

## 2020-03-19 RX ADMIN — LITHIUM CARBONATE 300 MG: 300 CAPSULE, GELATIN COATED ORAL at 08:47

## 2020-03-19 RX ADMIN — GABAPENTIN 600 MG: 300 CAPSULE ORAL at 08:49

## 2020-03-19 RX ADMIN — THIAMINE HCL TAB 100 MG 100 MG: 100 TAB at 08:48

## 2020-03-19 NOTE — NURSING NOTE
PRN Atarax mildly effective  Pt cooperative and med compliant  States her tooth pain has improved but reports pain if she chews the wrong way  Denies SI/HI/AH/VH  Admits to anxiety  Visible and social with peers and staff

## 2020-03-19 NOTE — NURSING NOTE
Patient ambulated out of unit without difficulty or requiring assistance   Patient belongings returned to patient

## 2020-03-19 NOTE — DISCHARGE SUMMARY
Discharge Summary - [address] 48 y o  female MRN: [id number]  Unit/Bed#: [facility] 621-83 Encounter: [id number]     Admission Date: 3/14/2020         Discharge Date:  March 19, 2020    Attending Psychiatrist: Carly Thomas MD    Reason for Admission/HPI:    Copied and pasted from initial evaluation by Carly Thomas MD March 13 210  48years old  who presents with suicidal ideation while intoxicated  The patient reports that several days ago, she got into an argument with her son and overdosed on antihypertensive medications and was admitted to ICU for 3 days and from there she was referred for inpatient psychiatric treatment but she did not follow through  She continued to drink heavily and then she had a fall and came to the emergency department and after medical clearance, she was admitted to [facility] since there were no beds on younger adult units  The patient reports insomnia, weight gain and frequent suicidal thoughts and feeling overwhelmed by her inability to stop drinking  She reports that she has been compliant with her medications that she receives from [facility]  These include Latuda and lithium and reports that she has been compliant with pharmacotherapy and also counseling she receives there  She admits to stressors mainly in the form of conflict with her son who lives with her  Meds/Allergies     all current active meds have been reviewed    Allergies   Allergen Reactions    Latex Rash       Objective     Vital signs in last 24 hours:  Temp:  [97 7 °F (36 5 °C)-98 9 °F (37 2 °C)] 98 9 °F (37 2 °C)  HR:  [88-98] 89  Resp:  [16-20] 20  BP: (105-140)/(55-97) 105/66      Intake/Output Summary (Last 24 hours) at 3/19/2020 1122  Last data filed at 3/19/2020 0918  Gross per 24 hour   Intake 1140 ml   Output    Net 1140 ml       Hospital Course: The patient was admitted to the inpatient psychiatric unit and started on every 15 minutes precautions    A treatment plan was formed with focus on pharmacotherapy and milieu therapy, group therapy and individual psychotherapy when indicated  Psychiatric medications were titrated over the hospital stay and milieu therapy was utilized  To addressdepressive symptoms, Severe anxiety and potential alcohol withdrawal symptoms the patient was started on mood stabilizer Lithium, antipsychotic medication Latuda and anxiolytic medication Buspar and Neurontin  Naltrexone was used to assist with alcohol dependence Medication doses were titrated during the hospital course  Patient's symptoms improved gradually over the hospital course  At the end of treatment the patient was doing well  Mood was stable at the time of discharge  The patient denied suicidal ideation, intent or plan at the time of discharge and denied homicidal ideation, intent or plan at the time of discharge  There was no overt psychosis at the time of discharge  Sleep and appetite were improved  The patient was tolerating medications and was not reporting any significant side effects at the time of discharge  Since the patient was doing well at the end of the hospitalization, treatment team felt that the patient had maximally benefitted from inpatient treatment and could be safely discharged to outpatient care  We will check a lithium level next week      The outpatient follow up with a psychiatrist was arranged by the unit  upon discharge      Mental Status at Time of Discharge:   Appearance:  Adequate hygiene and grooming and Good eye contact   Behavior:  calm and cooperative   Speech:   Language: Normal rate and Normal volume  No overt abnormality   Mood:  euthymic   Affect:   Associations: appropriate  Tightly connected   Thought Process:  Goal directed and coherent   Thought Content:  Does not verbalize delusional material   Perceptual Disturbances: Denies hallucinations and does not appear to be responding to internal stimuli     Risk Potential: No suicidal or homicidal ideation   Orientation   Language Oriented x 3  anomia No   Memory  Fund of knowledge grossly intact  vocabulary Average   Attention/Concentration attention span and concentration were age appropriate   Insight:  Good insight   Judgment: Good judgment   Gait/Station: normal gait/station and normal balance   Motor Activity: No abnormal movement noted       Admission Diagnosis:  Principal Problem:    Alcohol dependence with alcohol-induced mood disorder ([facility] ) rule out bipolar disorder  Active Problems:    Generalized anxiety disorder    Essential hypertension    Acquired hypothyroidism    Gastritis    Hypercholesteremia    Tobacco abuse    Leukocytosis      Discharge Diagnosis:     Principal Problem:    Alcohol dependence with alcohol-induced mood disorder ([facility] ) rule out bipolar disorder  Active Problems:    Generalized anxiety disorder    Essential hypertension    Acquired hypothyroidism    Gastritis    Hypercholesteremia    Tobacco abuse    Leukocytosis  Resolved Problems:    * No resolved hospital problems  *      Lab results:    No visits with results within 1 Day(s) from this visit     Latest known visit with results is:   Admission on 03/13/2020, Discharged on 03/14/2020   Component Date Value    WBC 03/13/2020 18 41*    RBC 03/13/2020 4 42     Hemoglobin 03/13/2020 14 1     Hematocrit 03/13/2020 41 4     MCV 03/13/2020 94     MCH 03/13/2020 31 9     MCHC 03/13/2020 34 1     RDW 03/13/2020 12 8     MPV 03/13/2020 9 4     Platelets 63/38/0124 429*    nRBC 03/13/2020 0     Neutrophils Relative 03/13/2020 75     Immat GRANS % 03/13/2020 1     Lymphocytes Relative 03/13/2020 17     Monocytes Relative 03/13/2020 7     Eosinophils Relative 03/13/2020 0     Basophils Relative 03/13/2020 0     Neutrophils Absolute 03/13/2020 13 81*    Immature Grans Absolute 03/13/2020 0 17     Lymphocytes Absolute 03/13/2020 3 08     Monocytes Absolute 03/13/2020 1 30*    Eosinophils Absolute 03/13/2020 0 00     Basophils Absolute 03/13/2020 0 05     Sodium 03/13/2020 131*    Potassium 03/13/2020 4 3     Chloride 03/13/2020 94*    CO2 03/13/2020 19*    ANION GAP 03/13/2020 18*    BUN 03/13/2020 9     Creatinine 03/13/2020 0 81     Glucose 03/13/2020 246*    Calcium 03/13/2020 9 5     eGFR 03/13/2020 84     Total Bilirubin 03/13/2020 0 24     Bilirubin, Direct 03/13/2020 0 10     Alkaline Phosphatase 03/13/2020 113     AST 03/13/2020 28     ALT 03/13/2020 57     Total Protein 03/13/2020 8 7*    Albumin 03/13/2020 4 2     Amph/Meth UR 03/13/2020 Negative     Barbiturate Ur 03/13/2020 Negative     Benzodiazepine Urine 03/13/2020 Negative     Cocaine Urine 03/13/2020 Negative     Methadone Urine 03/13/2020 Negative     Opiate Urine 03/13/2020 Negative     PCP Ur 03/13/2020 Negative     THC Urine 03/13/2020 Negative     Color, UA 03/13/2020 yellow     Ethanol Lvl 03/13/2020 118*    Color, UA 03/13/2020 Yellow     Clarity, UA 03/13/2020 Clear     pH, UA 03/13/2020 5 0     Leukocytes, UA 03/13/2020 Trace*    Nitrite, UA 03/13/2020 Negative     Protein, UA 03/13/2020 Negative     Glucose, UA 03/13/2020 100 (1/10%)*    Ketones, UA 03/13/2020 Negative     Urobilinogen, UA 03/13/2020 0 2     Bilirubin, UA 03/13/2020 Negative     Blood, UA 03/13/2020 Trace*    Specific [place], UA 03/13/2020 1 010     RBC, UA 03/13/2020 1-2*    WBC, UA 03/13/2020 4-10*    Epithelial Cells 03/13/2020 Occasional     Bacteria, UA 03/13/2020 Occasional     Lithium Lvl 03/13/2020 <0 2*    Ventricular Rate 03/13/2020 126     Atrial Rate 03/13/2020 126     W[place] Interval 03/13/2020 156     QRSD Interval 03/13/2020 68     QT Interval 03/13/2020 306     QTC Interval 03/13/2020 443     P Axis 03/13/2020 37     QRS Axis 03/13/2020 65     T Wave Axis 03/13/2020 3     POC Glucose 03/13/2020 216*    Sodium 03/13/2020 140     Potassium 03/13/2020 3 9     Chloride 03/13/2020 102     CO2 03/13/2020 28     ANION GAP 03/13/2020 10     BUN 03/13/2020 12     Creatinine 03/13/2020 0 90     Glucose 03/13/2020 163*    Calcium 03/13/2020 8 9     eGFR 03/13/2020 74     WBC 03/14/2020 11 12*    RBC 03/14/2020 4 18     Hemoglobin 03/14/2020 13 1     Hematocrit 03/14/2020 39 9     MCV 03/14/2020 96     MCH 03/14/2020 31 3     MCHC 03/14/2020 32 8     RDW 03/14/2020 12 9     MPV 03/14/2020 9 4     Platelets 66/14/7595 343     nRBC 03/14/2020 0     Neutrophils Relative 03/14/2020 69     Immat GRANS % 03/14/2020 0     Lymphocytes Relative 03/14/2020 23     Monocytes Relative 03/14/2020 8     Eosinophils Relative 03/14/2020 0     Basophils Relative 03/14/2020 0     Neutrophils Absolute 03/14/2020 7 61     Immature Grans Absolute 03/14/2020 0 05     Lymphocytes Absolute 03/14/2020 2 50     Monocytes Absolute 03/14/2020 0 92     Eosinophils Absolute 03/14/2020 0 01     Basophils Absolute 03/14/2020 0 03        Discharge Medications:    See after visit summary for reconciled discharge medications provided to patient and family  Discharge instructions/Information to patient and family:     See after visit summary for information provided to patient and family  Provisions for Follow-Up Care:    See after visit summary for information related to follow-up care and any pertinent home health orders  Discharge Statement     I spent 30 minutes discharging the patient  This time was spent on the day of discharge  I had direct contact with the patient on the day of discharge  Additional documentation is required if more than 30 minutes were spent on discharge:    I reviewed with Prudy Read importance of compliance with medications and outpatient treatment after discharge  I discussed the medication regimen and possible side effects of the medications with Prudy Read prior to discharge  At the time of discharge she was tolerating psychiatric medications    I discussed outpatient follow up with Karson Morgan  I reviewed with Karson Morgan crisis plan and safety plan upon discharge  I discussed with Karson Morgan recommendation to follow up with outpatient drug and alcohol counseling and AA meetings

## 2020-03-19 NOTE — DISCHARGE INSTR - OTHER ORDERS
DRUG AND ALCOHOL RECOVERY  [facility] is committed to helping its clients address and recover from addiction to drugs and alcohol  Our therapists have one focus: to bring long-term sobriety to every client  We offer an initial assessment, and assist clients with individual and group therapy by creating relapse prevention plans for lasting recovery  ? Contact: Kurtis Cruz   Tel: [phone]    [facility] Crisis Phone Number : [address] [facility] Phone Number : [phone]  National Suicide Hotline : 1 889.208.4565    *Telephone Support Services:   When you need someone to listen, the Jethro Kocher is available from the hours of 6am- 2am, 7 days a week  No one is available from the hours of 2am-6am  A representative can be reached at [phone]  *Provider Appointments:   Walk-in appointments are available Monday-Thursday 9am-11am and Wednesday 1:00-3:00 at [facility] Room 60  The address [place], [place] and the telephone number is [phone]  Please refer to handout for what is necessary to take with you  Monthly Support for Persons with Mental Illness  The Peer Support Group is a monthly meeting for individuals facing the challenges of recovering from severe and persistent mental illness  Depression, manic depression, schizophrenia, and general anxiety disorder are only a few of the diagnoses of individuals who have found a supportive place at our meetings  Our [place]  We are a fellowship of individuals who share a common goal of recovery and the ability to maintain mental and emotional stability  We help others and ourselves through sharing our experiences, strength and hope with each other  No matter how traumatic our past or how despairing our present may be, there is hope for a new day  Sessions take place in an intimate, confidential setting to allow individuals to share openly with each other  You'll know that you are not alone    Drop inno registration is necessary  Here are the times and locations    [place]  Monthly: 1st Monday, 7:00-8:30 pm  [facility]  [address] [place], [place]   [id number]  Monthly: 3rd Monday, 7:00-8:30 pm  [address]  [address] [place] [place]

## 2020-03-19 NOTE — CASE MANAGEMENT
Patient is being discharged, no SI/HI, no psychosis or A/V  Patient is in agreement with discharge  No questions verbalized  Patient provided with after care appointment, discharge instructions and prescriptions  IMM, core measures, transition of care, discharge instructions, and treatment plan complete  Patient will be transported by LY at 1pm for a discharge home with outpatient services   SW will continue to follow up as needed

## 2020-03-19 NOTE — DISCHARGE INSTR - LAB
Contact Information: If you have any questions, concerns, pended studies, tests and/or procedures, or emergencies regarding your inpatient behavioral health visit  Please contact [facility] older adult behavioral health unit 6T [phone] and ask to speak to a , nurse or physician  A contact is available 24 hours/ 7 days a week at this number  Summary of Procedures Performed During your Stay:  Below is a list of major procedures performed during your hospital stay and a summary of results:  - No major procedures performed  Pending Studies (From admission, onward)     Start     Ordered    03/19/20 0000  Lithium level      03/19/20 1121              If studies are pending at discharge, follow up with your PCP and/or referring provider

## 2020-03-19 NOTE — TREATMENT TEAM
Pt refused to attempt a relapse prevention plan and did not attend any group when offered to her  Is scheduled for discharge today       03/19/20 1000   Activity/Group Checklist   Group [employer] meeting   Attendance Refused

## 2020-03-19 NOTE — TREATMENT TEAM
03/19/20 0911   Team Meeting   Meeting Type Daily Rounds   Team Members Present   Team Members Present Physician;Nurse;;Occupational Therapist;Other (Discipline and Name)   Physician Team Member Magali   Nursing Team Member Cassidy Marquez, [address] [place] [address] Management Team Member Thony Diaz   OT Team Member Brianna Arora   Other (Discipline and Name) Braeden Huff; Pharmacist, Anh Hill     Reviewed case with team  Pt is D/C today, refused to do a relapse prevention plan

## 2020-03-19 NOTE — PLAN OF CARE
Problem: DISCHARGE PLANNING  Goal: Discharge to home or other facility with appropriate resources  Description  INTERVENTIONS:  - Identify barriers to discharge w/patient and caregiver  - Arrange for needed discharge resources and transportation as appropriate  - Identify discharge learning needs (meds, wound care, etc )  - Arrange for interpretive services to assist at discharge as needed  - Refer to Case Management Department for coordinating discharge planning if the patient needs post-hospital services based on physician/advanced practitioner order or complex needs related to functional status, cognitive ability, or social support system  Outcome: Completed     Patient in agreement with discharge

## 2020-03-19 NOTE — PLAN OF CARE
Problem: Risk for Self Injury/Neglect  Goal: Treatment Goal: Remain safe during length of stay, learn and adopt new coping skills, and be free of self-injurious ideation, impulses and acts at the time of discharge  Outcome: Adequate for Discharge     Problem: Depression  Goal: Treatment Goal: Demonstrate behavioral control of depressive symptoms, verbalize feelings of improved mood/affect, and adopt new coping skills prior to discharge  Outcome: Adequate for Discharge     Problem: Anxiety  Goal: Anxiety is at manageable level  Description  Interventions:  - Assess and monitor patient's anxiety level  - Monitor for signs and symptoms (heart palpitations, chest pain, shortness of breath, headaches, nausea, feeling jumpy, restlessness, irritable, apprehensive)  - Collaborate with interdisciplinary team and initiate plan and interventions as ordered    - [place] patient to unit/surroundings  - Explain treatment plan  - Encourage participation in care  - Encourage verbalization of concerns/fears  - Identify coping mechanisms  - Assist in developing anxiety-reducing skills  - Administer/offer alternative therapies  - Limit or eliminate stimulants  Outcome: Adequate for Discharge     Problem: Alteration in Thoughts and Perception  Goal: Treatment Goal: Gain control of psychotic behaviors/thinking, reduce/eliminate presenting symptoms and demonstrate improved reality functioning upon discharge  Outcome: Adequate for Discharge  Goal: Verbalize thoughts and feelings  Description  Interventions:  - Promote a nonjudgmental and trusting relationship with the patient through active listening and therapeutic communication  - Assess patient's level of functioning, behavior and potential for risk  - Engage patient in 1 on 1 interactions  - Encourage patient to express fears, feelings, frustrations, and discuss symptoms    - [place] patient to reality, help patient recognize reality-based thinking   - Administer medications as ordered and assess for potential side effects  - Provide the patient education related to the signs and symptoms of the illness and desired effects of prescribed medications  Outcome: Adequate for Discharge  Goal: Refrain from acting on delusional thinking/internal stimuli  Description  Interventions:  - Monitor patient closely, per order   - Utilize least restrictive measures   - Set reasonable limits, give positive feedback for acceptable   - Administer medications as ordered and monitor of potential side effects  Outcome: Adequate for Discharge  Goal: Agree to be compliant with medication regime, as prescribed and report medication side effects  Description  Interventions:  - Offer appropriate PRN medication and supervise ingestion; conduct AIMS, as needed   Outcome: Adequate for Discharge  Goal: Attend and participate in unit activities, including therapeutic, recreational, and educational groups  Description  Interventions:  -Encourage Visitation and family involvement in care  Outcome: Adequate for Discharge  Goal: Recognize dysfunctional thoughts, communicate reality-based thoughts at the time of discharge  Description  Interventions:  - Provide medication and psycho-education to assist patient in compliance and developing insight into his/her illness   Outcome: Adequate for Discharge  Goal: Complete daily ADLs, including personal hygiene independently, as able  Description  Interventions:  - Observe, teach, and assist patient with ADLS  - Monitor and promote a balance of rest/activity, with adequate nutrition and elimination   Outcome: Adequate for Discharge     Problem: Ineffective Coping  Goal: Cooperates with admission process  Description  Interventions:   - Complete admission process  Outcome: Adequate for Discharge  Goal: Identifies ineffective coping skills  Outcome: Adequate for Discharge  Goal: Identifies healthy coping skills  Outcome: Adequate for Discharge  Goal: Demonstrates healthy coping skills  Outcome: Adequate for Discharge  Goal: Participates in unit activities  Description  Interventions:  - Provide therapeutic environment   - Provide required programming   - Redirect inappropriate behaviors   Outcome: Adequate for Discharge  Goal: Patient/Family participate in treatment and DC plans  Description  Interventions:  - Provide therapeutic environment  Outcome: Adequate for Discharge  Goal: Patient/Family verbalizes awareness of resources  Outcome: Adequate for Discharge  Goal: Understands least restrictive measures  Description  Interventions:  - Utilize least restrictive behavior  Outcome: Adequate for Discharge  Goal: Free from restraint events  Description  - Utilize least restrictive measures   - Provide behavioral interventions   - Redirect inappropriate behaviors   Outcome: Adequate for Discharge     Problem: Risk for Self Injury/Neglect  Goal: Verbalize thoughts and feelings  Description  Interventions:  - Assess and re-assess patient's lethality and potential for self-injury  - Engage patient in 1:1 interactions, daily, for a minimum of 15 minutes  - Encourage patient to express feelings, fears, frustrations, hopes  - Establish rapport/trust with patient   Outcome: Adequate for Discharge  Goal: Refrain from harming self  Description  Interventions:  - Monitor patient closely, per order  - Develop a trusting relationship  - Supervise medication ingestion, monitor effects and side effects   Outcome: Adequate for Discharge  Goal: Attend and participate in unit activities, including therapeutic, recreational, and educational groups  Description  Interventions:  - Provide therapeutic and educational activities daily, encourage attendance and participation, and document same in the medical record  - Obtain collateral information, encourage visitation and family involvement in care   Outcome: Adequate for Discharge  Goal: Recognize maladaptive responses and adopt new coping mechanisms  Outcome: Adequate for Discharge  Goal: Complete daily ADLs, including personal hygiene independently, as able  Description  Interventions:  - Observe, teach, and assist patient with ADLS  - Monitor and promote a balance of rest/activity, with adequate nutrition and elimination  Outcome: Adequate for Discharge     Problem: Depression  Goal: Verbalize thoughts and feelings  Description  Interventions:  - Assess and re-assess patient's level of risk   - Engage patient in 1:1 interactions, daily, for a minimum of 15 minutes   - Encourage patient to express feelings, fears, frustrations, hopes   Outcome: Adequate for Discharge  Goal: Refrain from harming self  Description  Interventions:  - Monitor patient closely, per order   - Supervise medication ingestion, monitor effects and side effects   Outcome: Adequate for Discharge  Goal: Refrain from isolation  Description  Interventions:  - Develop a trusting relationship   - Encourage socialization   Outcome: Adequate for Discharge  Goal: Refrain from self-neglect  Outcome: Adequate for Discharge  Goal: Attend and participate in unit activities, including therapeutic, recreational, and educational groups  Description  Interventions:  - Provide therapeutic and educational activities daily, encourage attendance and participation, and document same in the medical record   Outcome: Adequate for Discharge  Goal: Complete daily ADLs, including personal hygiene independently, as able  Description  Interventions:  - Observe, teach, and assist patient with ADLS  -  Monitor and promote a balance of rest/activity, with adequate nutrition and elimination   Outcome: Adequate for Discharge

## 2020-03-19 NOTE — BH TRANSITION RECORD
Contact Information: If you have any questions, concerns, pended studies, tests and/or procedures, or emergencies regarding your inpatient behavioral health visit  Please contact [address] [place] [address] older adult behavioral health unit 6T [phone] and ask to speak to a , nurse or physician  A contact is available 24 hours/ 7 days a week at this number  Summary of Procedures Performed During your Stay:  Below is a list of major procedures performed during your hospital stay and a summary of results:  - No major procedures performed  Pending Studies (From admission, onward)    None        If studies are pending at discharge, follow up with your PCP and/or referring provider

## 2020-03-22 DIAGNOSIS — F31.4 BIPOLAR I DISORDER, MOST RECENT EPISODE DEPRESSED, SEVERE WITHOUT PSYCHOTIC FEATURES (HCC): Chronic | ICD-10-CM

## 2020-03-22 RX ORDER — LAMOTRIGINE 25 MG/1
TABLET ORAL
Qty: 60 TABLET | Refills: 0 | OUTPATIENT
Start: 2020-03-22

## 2020-03-24 ENCOUNTER — HOSPITAL ENCOUNTER (EMERGENCY)
Facility: HOSPITAL | Age: 55
Discharge: HOME | End: 2020-03-24
Attending: EMERGENCY MEDICINE | Admitting: EMERGENCY MEDICINE
Payer: MEDICARE

## 2020-03-24 VITALS
TEMPERATURE: 99.3 F | DIASTOLIC BLOOD PRESSURE: 76 MMHG | BODY MASS INDEX: 35.15 KG/M2 | HEART RATE: 94 BPM | SYSTOLIC BLOOD PRESSURE: 146 MMHG | OXYGEN SATURATION: 97 % | WEIGHT: 198.41 LBS | RESPIRATION RATE: 20 BRPM

## 2020-03-24 DIAGNOSIS — F10.10 ALCOHOL ABUSE: Primary | ICD-10-CM

## 2020-03-24 LAB
ALBUMIN SERPL BCP-MCNC: 4.8 G/DL (ref 3–5.2)
ALP SERPL-CCNC: 109 U/L (ref 43–122)
ALT SERPL W P-5'-P-CCNC: 44 U/L (ref 9–52)
AMPHETAMINES SERPL QL SCN: NEGATIVE
ANION GAP SERPL CALCULATED.3IONS-SCNC: 16 MMOL/L (ref 5–14)
AST SERPL W P-5'-P-CCNC: 27 U/L (ref 14–36)
BARBITURATES UR QL: NEGATIVE
BASOPHILS # BLD AUTO: 0 THOUSANDS/ΜL (ref 0–0.1)
BASOPHILS NFR BLD AUTO: 0 % (ref 0–1)
BENZODIAZ UR QL: POSITIVE
BILIRUB SERPL-MCNC: 0.4 MG/DL
BUN SERPL-MCNC: 11 MG/DL (ref 5–25)
CALCIUM SERPL-MCNC: 9.8 MG/DL (ref 8.4–10.2)
CHLORIDE SERPL-SCNC: 98 MMOL/L (ref 97–108)
CO2 SERPL-SCNC: 21 MMOL/L (ref 22–30)
COCAINE UR QL: NEGATIVE
CREAT SERPL-MCNC: 0.63 MG/DL (ref 0.6–1.2)
EOSINOPHIL # BLD AUTO: 0 THOUSAND/ΜL (ref 0–0.4)
EOSINOPHIL NFR BLD AUTO: 0 % (ref 0–6)
ERYTHROCYTE [DISTWIDTH] IN BLOOD BY AUTOMATED COUNT: 14.2 %
GFR SERPL CREATININE-BSD FRML MDRD: 103 ML/MIN/1.73SQ M
GLUCOSE SERPL-MCNC: 170 MG/DL (ref 70–99)
HCT VFR BLD AUTO: 42.8 % (ref 36–46)
HGB BLD-MCNC: 14.7 G/DL (ref 12–16)
LITHIUM SERPL-SCNC: 1.2 MMOL/L (ref 0.6–1.2)
LYMPHOCYTES # BLD AUTO: 2.7 THOUSANDS/ΜL (ref 0.5–4)
LYMPHOCYTES NFR BLD AUTO: 21 % (ref 25–45)
MCH RBC QN AUTO: 31.3 PG (ref 26–34)
MCHC RBC AUTO-ENTMCNC: 34.3 G/DL (ref 31–36)
MCV RBC AUTO: 91 FL (ref 80–100)
METHADONE UR QL: NEGATIVE
MONOCYTES # BLD AUTO: 1.3 THOUSAND/ΜL (ref 0.2–0.9)
MONOCYTES NFR BLD AUTO: 10 % (ref 1–10)
NEUTROPHILS # BLD AUTO: 9.1 THOUSANDS/ΜL (ref 1.8–7.8)
NEUTS SEG NFR BLD AUTO: 69 % (ref 45–65)
OPIATES UR QL SCN: NEGATIVE
PCP UR QL: NEGATIVE
PLATELET # BLD AUTO: 461 THOUSANDS/UL (ref 150–450)
PMV BLD AUTO: 7.3 FL (ref 8.9–12.7)
POTASSIUM SERPL-SCNC: 3.9 MMOL/L (ref 3.6–5)
PROT SERPL-MCNC: 8.6 G/DL (ref 5.9–8.4)
RBC # BLD AUTO: 4.69 MILLION/UL (ref 4–5.2)
SODIUM SERPL-SCNC: 135 MMOL/L (ref 137–147)
THC UR QL: NEGATIVE
WBC # BLD AUTO: 13.2 THOUSAND/UL (ref 4.5–11)

## 2020-03-24 PROCEDURE — 80178 ASSAY OF LITHIUM: CPT | Performed by: EMERGENCY MEDICINE

## 2020-03-24 PROCEDURE — 80053 COMPREHEN METABOLIC PANEL: CPT | Performed by: EMERGENCY MEDICINE

## 2020-03-24 PROCEDURE — 99283 EMERGENCY DEPT VISIT LOW MDM: CPT | Performed by: EMERGENCY MEDICINE

## 2020-03-24 PROCEDURE — 85025 COMPLETE CBC W/AUTO DIFF WBC: CPT | Performed by: EMERGENCY MEDICINE

## 2020-03-24 PROCEDURE — 99285 EMERGENCY DEPT VISIT HI MDM: CPT

## 2020-03-24 PROCEDURE — 96360 HYDRATION IV INFUSION INIT: CPT

## 2020-03-24 PROCEDURE — 96361 HYDRATE IV INFUSION ADD-ON: CPT

## 2020-03-24 PROCEDURE — 36415 COLL VENOUS BLD VENIPUNCTURE: CPT | Performed by: EMERGENCY MEDICINE

## 2020-03-24 PROCEDURE — 80307 DRUG TEST PRSMV CHEM ANLYZR: CPT | Performed by: EMERGENCY MEDICINE

## 2020-03-24 RX ORDER — CHLORDIAZEPOXIDE HYDROCHLORIDE 25 MG/1
50 CAPSULE, GELATIN COATED ORAL 3 TIMES DAILY PRN
Qty: 12 CAPSULE | Refills: 0 | Status: SHIPPED | OUTPATIENT
Start: 2020-03-24 | End: 2020-03-24 | Stop reason: SDUPTHER

## 2020-03-24 RX ORDER — LORAZEPAM 0.5 MG/1
1 TABLET ORAL ONCE
Status: COMPLETED | OUTPATIENT
Start: 2020-03-24 | End: 2020-03-24

## 2020-03-24 RX ORDER — CHLORDIAZEPOXIDE HYDROCHLORIDE 25 MG/1
50 CAPSULE, GELATIN COATED ORAL ONCE
Status: DISCONTINUED | OUTPATIENT
Start: 2020-03-24 | End: 2020-03-24 | Stop reason: HOSPADM

## 2020-03-24 RX ORDER — ONDANSETRON 4 MG/1
4 TABLET, ORALLY DISINTEGRATING ORAL EVERY 8 HOURS PRN
Qty: 20 TABLET | Refills: 0 | Status: SHIPPED | OUTPATIENT
Start: 2020-03-24 | End: 2020-03-24 | Stop reason: SDUPTHER

## 2020-03-24 RX ORDER — ONDANSETRON 4 MG/1
4 TABLET, ORALLY DISINTEGRATING ORAL EVERY 8 HOURS PRN
Qty: 20 TABLET | Refills: 0 | Status: SHIPPED | OUTPATIENT
Start: 2020-03-24 | End: 2020-08-03 | Stop reason: HOSPADM

## 2020-03-24 RX ORDER — CHLORDIAZEPOXIDE HYDROCHLORIDE 25 MG/1
50 CAPSULE, GELATIN COATED ORAL 3 TIMES DAILY PRN
Qty: 12 CAPSULE | Refills: 0 | Status: SHIPPED | OUTPATIENT
Start: 2020-03-24 | End: 2020-05-14 | Stop reason: HOSPADM

## 2020-03-24 RX ORDER — CHLORDIAZEPOXIDE HYDROCHLORIDE 25 MG/1
25 CAPSULE, GELATIN COATED ORAL ONCE
Status: COMPLETED | OUTPATIENT
Start: 2020-03-24 | End: 2020-03-24

## 2020-03-24 RX ORDER — LORAZEPAM 0.5 MG/1
1 TABLET ORAL ONCE
Status: DISCONTINUED | OUTPATIENT
Start: 2020-03-24 | End: 2020-03-24 | Stop reason: HOSPADM

## 2020-03-24 RX ADMIN — CHLORDIAZEPOXIDE HYDROCHLORIDE 25 MG: 25 CAPSULE ORAL at 14:10

## 2020-03-24 RX ADMIN — SODIUM CHLORIDE 1000 ML: 0.9 INJECTION, SOLUTION INTRAVENOUS at 04:16

## 2020-03-24 RX ADMIN — LORAZEPAM 1 MG: 0.5 TABLET ORAL at 14:10

## 2020-03-24 RX ADMIN — METOPROLOL TARTRATE 12.5 MG: 25 TABLET ORAL at 13:03

## 2020-03-24 RX ADMIN — LORAZEPAM 1 MG: 0.5 TABLET ORAL at 04:11

## 2020-03-24 NOTE — ED NOTES
Pt given breakfast tray by ed tech   Pt did not wake up for breakfast and continues to sleep     Rambo Amato RN  03/24/20 [address] [address] Jordyn Sauceda RN  03/24/20 6683

## 2020-03-24 NOTE — ED NOTES
Call received from Ernesto from HOST  He stated they are processing the case and will call with a disposition

## 2020-03-24 NOTE — ED NOTES
Patient stated she has completed the phone assessment with Humble Mckeon from HOST  Humble Mckeon is now in the process of a bed search

## 2020-03-24 NOTE — ED NOTES
While assisting patient with HOST paperwork, she was very motivated for detox and rehab  She stated her family is going to be upset with her  When asked why they would be upset that she is getting treatment, she stated that she helps run the household and financially contributes as well  Encouraged her to stick with her goals and explain to them that this is important to her  She stated she does recognize it will be important to follow through and will attempt to explain this to her family

## 2020-03-24 NOTE — ED NOTES
Belongings in the safe were returned to the pt and clothing that was stored in the locker was also returned     Emilia Short RN  03/24/20 2723

## 2020-03-24 NOTE — ED NOTES
Call from Obinna from HOST  She stated the bed search has been exhausted  Patient will be discharged to home with HOST follow up phone number, [phone]  HOST will communicate with her directly at this point  Discussed with Cindra Bamberger, DO   Patient asked for a Trazodone prescription; however, this was not on her medication list

## 2020-03-24 NOTE — ED NOTES
Patient was recently admitted to [facility] for inpatient mental health treatment following an intentional overdose  Patient reports this evening she would like to go to rehab  Patient stated she is tired of living how she is living and can't stop drinking on her own  Patient reports drinking 6-8 22oz beers daily  Patient denied any drug use  Patient denied auditory and visual hallucinations  Patient denied homicidal and suicidal ideations  Patient stated she has no real stressors in her life currently, she reports having a safe place to live and she is happy with the people around her however she says she just can't stop drinking  Patient reports having previous admission for detox/rehab several months ago  She believes last place was [facility] clinic but she does not remember when  Patient stated she needs help and wants to get better  Patient agreed to first available rehab bed to send referral to

## 2020-03-24 NOTE — ED NOTES
Pt asleep  Respirations stable, pt appears comfortable   Continuing to round     Luis Bolanos RN  03/24/20 8076

## 2020-03-24 NOTE — ED CARE HANDOFF
Emergency Department Sign Out Note        Sign out and transfer of care from Dr Diann Kaye  See Separate Emergency Department note  The patient, Jose Ramos, was evaluated by the previous provider for alcohol abuse  Workup Completed:  PE, labs    ED Course / Workup Pending (followup): Awaiting host placement      Patient evaluated by host and there are no beds available currently  She will be discharged home will be following up as an outpatient  She does not currently have any signs of significant alcohol withdrawal                              Procedures  MDM    Disposition  Final diagnoses:   Alcohol abuse     Time reflects when diagnosis was documented in both MDM as applicable and the Disposition within this note     Time User Action Codes Description Comment    3/24/2020  6:17 PM Tracie Gonzalez Add [F10 10] Alcohol abuse       ED Disposition     ED Disposition Condition Date/Time Comment    Discharge Stable Tue Mar 24, 2020  6:16 PM Jose Ramos discharge to home/self care  Follow-up Information    None       Patient's Medications   Discharge Prescriptions    CHLORDIAZEPOXIDE (LIBRIUM) 25 MG CAPSULE    Take 2 capsules (50 mg total) by mouth 3 (three) times a day as needed for withdrawal for up to 6 doses       Start Date: 3/24/2020 End Date: --       Order Dose: 50 mg       Quantity: 12 capsule    Refills: 0    ONDANSETRON (ZOFRAN-ODT) 4 MG DISINTEGRATING TABLET    Take 1 tablet (4 mg total) by mouth every 8 (eight) hours as needed for nausea or vomiting       Start Date: 3/24/2020 End Date: --       Order Dose: 4 mg       Quantity: 20 tablet    Refills: 0     No discharge procedures on file         ED Provider  Electronically Signed by     Debbie Pelayo DO  03/24/20 Kaleen Epley

## 2020-03-24 NOTE — ED NOTES
Pt ambulatory to ED room 10 with steady gait  Pt pacing room with steady gait       Michell Maciel RN  03/24/20 6853

## 2020-03-24 NOTE — ED NOTES
Pt c/o feeling "very anxious " and "shakey" Pt states "I never stay in rehab the amount of time I am supposed to but this time I need to   I can't take it its driving me crazy "     Jamin Brown RN  03/24/20 9812

## 2020-03-24 NOTE — ED NOTES
Received funding packet for completion  Received DINORAH's  Reviewed with the patient, collected and witnessed signatures and faxed back to [facility]  Receipt confirmed

## 2020-03-24 NOTE — ED NOTES
Pt reports drinking "6 tall cans of beer" and her last drink was today but she does not know at what time        Sparkle Johns RN  03/24/20 5575

## 2020-03-24 NOTE — ED NOTES
Call received from Irineo Conn clinician  She is currently assessing the patient by phone  She will then do a bed search

## 2020-03-24 NOTE — ED NOTES
Pt requested an ativan for anxiety  Provider notified   Denies other symptoms, but noted pts hands are shaking at this time     Aamir Hays, MARK ANTHONY  03/24/20 [facility], RN  03/24/20 6207

## 2020-03-24 NOTE — ED NOTES
Pt sleeping upon entering room  Pt woke up during CIWA and vitals and then went back to sleep  Provider aware   meds to be held at this time     Natanael Cruz, [address] [facility]  03/24/20 1071

## 2020-03-24 NOTE — ED NOTES
Due to patient only having Medicare coverage patient will need to be a HOST referral  Patient to be a hold for HOST interview in AM

## 2020-03-24 NOTE — ED PROVIDER NOTES
History  Chief Complaint   Patient presents with    Withdrawal - Alcohol     pt states that she would like to go to rehab for alcohol abuse  pt states that she normally drinks 6 tall cans of beer daily, last drink today  pt now having shakes, tremors  denies any ah/vh/th/si/hi  47 y/o W female here with c/o alcohol withdrawal symptoms - increased anxiety, tremors, and increased heart rate  She states that her last drink was yesterday (Monday) and that she drinks daily  Denies illicit drugs, SI/HI, and/or AH/VH  Patient recently admitted for SI and alcohol abuse at [facility] and transferred to [address] [place] [address]  She does have followup with [facility] scheduled for 3/25  She states that she is compliant with her medications  She is requesting detox from alcohol  She was referred to [facility] but was then admitted for SI  Prior to Admission Medications   Prescriptions Last Dose Informant Patient Reported? Taking?    amLODIPine (NORVASC) 5 mg tablet   No No   Sig: TAKE 1 TABLET BY MOUTH DAILY   busPIRone (BUSPAR) 10 mg tablet   No No   Sig: Take 1 5 tablets (15 mg total) by mouth 2 (two) times a day   cloNIDine (CATAPRES) 0 1 mg tablet   No No   Sig: Take 1 tablet (0 1 mg total) by mouth 2 (two) times a day   gabapentin (NEURONTIN) 300 mg capsule   No No   Sig: Take 2 capsules (600 mg total) by mouth 4 (four) times a day   lithium carbonate 150 mg capsule   No No   Sig: Take 2 capsules in the morning +3 capsules at bedtime   losartan (COZAAR) 100 MG tablet   No No   Sig: Take 1 tablet (100 mg total) by mouth daily   lurasidone (LATUDA) 80 mg tablet   No No   Sig: Take 1 tablet (80 mg total) by mouth daily with dinner   metoprolol tartrate (LOPRESSOR) 25 mg tablet   No No   Sig: Take 0 5 tablets (12 5 mg total) by mouth every 12 (twelve) hours   naltrexone (REVIA) 50 mg tablet   No No   Sig: Take 1 tablet (50 mg total) by mouth daily   nicotine (NICODERM CQ) 21 mg/24 hr TD 24 hr patch   No No   Sig: Place 1 patch on the skin daily   pantoprazole (PROTONIX) 40 mg tablet   No No   Sig: Take 1 tablet (40 mg total) by mouth daily in the early morning   sucralfate (CARAFATE) 1 g tablet   No No   Sig: Take 1 tablet (1 g total) by mouth 4 (four) times a day (before meals and at bedtime)   thiamine 100 MG tablet   No No   Sig: Take 1 tablet (100 mg total) by mouth daily      Facility-Administered Medications: None       Past Medical History:   Diagnosis Date    Alcohol abuse     Alcoholism ([place] [address] )     Anxiety     Bipolar disorder ([place] [address] )     Bowel obstruction ([place] [address] )     Depression     Gastritis     Head injury     Heart palpitations     Hyperlipidemia     Hypertension     Hypothyroidism     PTSD (post-traumatic stress disorder)     Seizure ([place] [address] )     Sleep difficulties     Suicide attempt [facility])        Past Surgical History:   Procedure Laterality Date    ABDOMINAL SURGERY      APPENDECTOMY      BOWEL RESECTION      CHOLECYSTECTOMY      ESOPHAGOGASTRODUODENOSCOPY N/A 5/18/2018    Procedure: ESOPHAGOGASTRODUODENOSCOPY (EGD) with bx;  Surgeon: Dayna Mendez DO;  Location: [facility]; Service: Gastroenterology    HYSTERECTOMY      KNEE SURGERY Bilateral        Family History   Problem Relation Age of Onset    Diabetes Father      I have reviewed and agree with the history as documented  E-Cigarette/Vaping    E-Cigarette Use Never User      E-Cigarette/Vaping Substances     Social History     Tobacco Use    Smoking status: Current Every Day Smoker     Packs/day: 1 00     Years: 25 00     Pack years: 25 00     Types: Cigarettes    Smokeless tobacco: Never Used   Substance Use Topics    Alcohol use: Yes     Alcohol/week: 6 0 standard drinks     Types: 6 Cans of beer per week     Frequency: 4 or more times a week     Drinks per session: 5 or 6     Binge frequency: Daily or almost daily     Comment: daily, last use today    Drug use: No       Review of Systems   Neurological: Positive for tremors  Psychiatric/Behavioral: Positive for agitation and dysphoric mood  The patient is nervous/anxious  All other systems reviewed and are negative  Physical Exam  Physical Exam   Constitutional: She is oriented to person, place, and time  She appears well-developed and well-nourished  HENT:   Head: Normocephalic and atraumatic  Eyes: Pupils are equal, round, and reactive to light  Conjunctivae and EOM are normal    Neck: Normal range of motion  Neck supple  Pulmonary/Chest: Effort normal and breath sounds normal    Abdominal: Soft  Bowel sounds are normal    Musculoskeletal: Normal range of motion  Neurological: She is alert and oriented to person, place, and time  She displays tremor  Skin: Skin is warm and dry  Capillary refill takes less than 2 seconds  Psychiatric: She has a normal mood and affect  Nursing note and vitals reviewed        Vital Signs  ED Triage Vitals [03/24/20 0344]   Temperature Pulse Respirations Blood Pressure SpO2   99 3 °F (37 4 °C) (!) 124 22 (!) 172/99 98 %      Temp Source Heart Rate Source Patient Position - Orthostatic VS BP Location FiO2 (%)   Tympanic Monitor Sitting Left arm --      Pain Score       --           Vitals:    03/24/20 0749 03/24/20 1155 03/24/20 1329 03/24/20 1805   BP: 115/64 136/71 129/74 146/76   Pulse: (!) 109 (!) 112 (!) 113 94   Patient Position - Orthostatic VS:   Lying Lying         Visual Acuity      ED Medications  Medications   sodium chloride 0 9 % bolus 1,000 mL (0 mL Intravenous Stopped 3/24/20 0715)   LORazepam (ATIVAN) tablet 1 mg (1 mg Oral Given 3/24/20 0411)   metoprolol tartrate (LOPRESSOR) partial tablet 12 5 mg (12 5 mg Oral Given 3/24/20 1303)   LORazepam (ATIVAN) tablet 1 mg (1 mg Oral Given 3/24/20 1410)   chlordiazePOXIDE (LIBRIUM) capsule 25 mg (25 mg Oral Given 3/24/20 1410)       Diagnostic Studies  Results Reviewed     Procedure Component Value Units Date/Time    Lithium level [[id number]]  (Normal) Collected:  03/24/20 5286    Lab Status:  Final result Specimen:  Blood from Arm, Left Updated:  03/24/20 0501     Lithium Lvl 1 2 mmol/L     Rapid drug screen, urine [[id number]]  (Abnormal) Collected:  03/24/20 0417    Lab Status:  Final result Specimen:  Urine, Clean Catch Updated:  03/24/20 0447     Amph/Meth UR Negative     Barbiturate Ur Negative     Benzodiazepine Urine Positive     Cocaine Urine Negative     Methadone Urine Negative     Opiate Urine Negative     PCP Ur Negative     THC Urine Negative    Narrative:       Presumptive report  If requested, specimen will be sent to reference lab for confirmation  FOR MEDICAL PURPOSES ONLY  IF CONFIRMATION NEEDED PLEASE CONTACT THE LAB WITHIN 5 DAYS      Drug Screen Cutoff Levels:  AMPHETAMINE/METHAMPHETAMINES  1000 ng/mL  BARBITURATES     200 ng/mL  BENZODIAZEPINES     200 ng/mL  COCAINE      300 ng/mL  METHADONE      300 ng/mL  OPIATES      300 ng/mL  PHENCYCLIDINE     25 ng/mL  THC       50 ng/mL      Comprehensive metabolic panel [[id number]]  (Abnormal) Collected:  03/24/20 0412    Lab Status:  Final result Specimen:  Blood from Arm, Left Updated:  03/24/20 0437     Sodium 135 mmol/L      Potassium 3 9 mmol/L      Chloride 98 mmol/L      CO2 21 mmol/L      ANION GAP 16 mmol/L      BUN 11 mg/dL      Creatinine 0 63 mg/dL      Glucose 170 mg/dL      Calcium 9 8 mg/dL      AST 27 U/L      ALT 44 U/L      Alkaline Phosphatase 109 U/L      Total Protein 8 6 g/dL      Albumin 4 8 g/dL      Total Bilirubin 0 40 mg/dL      eGFR 103 ml/min/1 73sq m     Narrative:       [place] guidelines for Chronic Kidney Disease (CKD):     Stage 1 with normal or high GFR (GFR > 90 mL/min/1 73 square meters)    Stage 2 Mild CKD (GFR = 60-89 mL/min/1 73 square meters)    Stage 3A Moderate CKD (GFR = 45-59 mL/min/1 73 square meters)    Stage 3B Moderate CKD (GFR = 30-44 mL/min/1 73 square meters)    Stage 4 Severe CKD (GFR = 15-29 mL/min/1 73 square meters)    Stage 5 End Stage CKD (GFR <15 mL/min/1 73 square meters)  Note: GFR calculation is accurate only with a steady state creatinine    CBC and differential [[id number]]  (Abnormal) Collected:  03/24/20 0412    Lab Status:  Final result Specimen:  Blood from Arm, Left Updated:  03/24/20 0437     WBC 13 20 Thousand/uL      RBC 4 69 Million/uL      Hemoglobin 14 7 g/dL      Hematocrit 42 8 %      MCV 91 fL      MCH 31 3 pg      MCHC 34 3 g/dL      RDW 14 2 %      MPV 7 3 fL      Platelets 211 Thousands/uL      Neutrophils Relative 69 %      Lymphocytes Relative 21 %      Monocytes Relative 10 %      Eosinophils Relative 0 %      Basophils Relative 0 %      Neutrophils Absolute 9 10 Thousands/µL      Lymphocytes Absolute 2 70 Thousands/µL      Monocytes Absolute 1 30 Thousand/µL      Eosinophils Absolute 0 00 Thousand/µL      Basophils Absolute 0 00 Thousands/µL                  No orders to display              Procedures  Procedures         ED Course  ED Course as of Mar 25 0634   Tue Mar 24, 2020   0405 BAT: 0 034         0505 Medically cleared  MDM      Disposition  Final diagnoses:   Alcohol abuse     Time reflects when diagnosis was documented in both MDM as applicable and the Disposition within this note     Time User Action Codes Description Comment    3/24/2020  6:17 PM Malaika Santiago Add [F10 10] Alcohol abuse       ED Disposition     ED Disposition Condition Date/Time Comment    Discharge Stable Tue Mar 24, 2020  6:16 PM Anusha Esteves discharge to home/self care              Follow-up Information    None         Discharge Medication List as of 3/24/2020  6:20 PM      START taking these medications    Details   chlordiazePOXIDE (LIBRIUM) 25 mg capsule Take 2 capsules (50 mg total) by mouth 3 (three) times a day as needed for withdrawal for up to 6 doses, Starting Tue 3/24/2020, Print      ondansetron (ZOFRAN-ODT) 4 mg disintegrating tablet Take 1 tablet (4 mg total) by mouth every 8 (eight) hours as needed for nausea or vomiting, Starting Tue 3/24/2020, Print         CONTINUE these medications which have NOT CHANGED    Details   amLODIPine (NORVASC) 5 mg tablet TAKE 1 TABLET BY MOUTH DAILY, Normal      busPIRone (BUSPAR) 10 mg tablet Take 1 5 tablets (15 mg total) by mouth 2 (two) times a day, Starting Thu 3/19/2020, Normal      cloNIDine (CATAPRES) 0 1 mg tablet Take 1 tablet (0 1 mg total) by mouth 2 (two) times a day, Starting Thu 3/19/2020, Normal      gabapentin (NEURONTIN) 300 mg capsule Take 2 capsules (600 mg total) by mouth 4 (four) times a day, Starting Thu 3/19/2020, Normal      lithium carbonate 150 mg capsule Take 2 capsules in the morning +3 capsules at bedtime, Normal      losartan (COZAAR) 100 MG tablet Take 1 tablet (100 mg total) by mouth daily, Starting Thu 3/19/2020, Normal      lurasidone (LATUDA) 80 mg tablet Take 1 tablet (80 mg total) by mouth daily with dinner, Starting Thu 3/19/2020, Normal      metoprolol tartrate (LOPRESSOR) 25 mg tablet Take 0 5 tablets (12 5 mg total) by mouth every 12 (twelve) hours, Starting Thu 3/19/2020, Normal      naltrexone (REVIA) 50 mg tablet Take 1 tablet (50 mg total) by mouth daily, Starting Fri 3/20/2020, Normal      nicotine (NICODERM CQ) 21 mg/24 hr TD 24 hr patch Place 1 patch on the skin daily, Starting Fri 3/20/2020, Normal      pantoprazole (PROTONIX) 40 mg tablet Take 1 tablet (40 mg total) by mouth daily in the early morning, Starting Thu 3/19/2020, Normal      sucralfate (CARAFATE) 1 g tablet Take 1 tablet (1 g total) by mouth 4 (four) times a day (before meals and at bedtime), Starting Thu 3/19/2020, Normal      thiamine 100 MG tablet Take 1 tablet (100 mg total) by mouth daily, Starting Thu 3/19/2020, Normal           No discharge procedures on file      PDMP Review     None          ED Provider  Electronically Signed by           Cali Beauchamp DO  03/25/20 4152

## 2020-04-07 RX ORDER — METOPROLOL TARTRATE 50 MG/1
TABLET, FILM COATED ORAL
Qty: 60 TABLET | OUTPATIENT
Start: 2020-04-07

## 2020-04-07 RX ORDER — DOXEPIN HYDROCHLORIDE 50 MG/1
CAPSULE ORAL
Qty: 30 CAPSULE | OUTPATIENT
Start: 2020-04-07

## 2020-04-07 RX ORDER — FOLIC ACID 1 MG/1
TABLET ORAL
Qty: 30 TABLET | OUTPATIENT
Start: 2020-04-07

## 2020-04-16 ENCOUNTER — HOSPITAL ENCOUNTER (EMERGENCY)
Facility: HOSPITAL | Age: 55
Discharge: HOME | End: 2020-04-16
Attending: EMERGENCY MEDICINE | Admitting: EMERGENCY MEDICINE
Payer: MEDICARE

## 2020-04-16 VITALS
OXYGEN SATURATION: 98 % | TEMPERATURE: 98.1 F | RESPIRATION RATE: 15 BRPM | SYSTOLIC BLOOD PRESSURE: 164 MMHG | DIASTOLIC BLOOD PRESSURE: 90 MMHG | HEART RATE: 98 BPM

## 2020-04-16 DIAGNOSIS — F31.4 BIPOLAR I DISORDER, MOST RECENT EPISODE DEPRESSED, SEVERE WITHOUT PSYCHOTIC FEATURES (HCC): Chronic | ICD-10-CM

## 2020-04-16 DIAGNOSIS — F10.20 UNCOMPLICATED ALCOHOL DEPENDENCE (HCC): ICD-10-CM

## 2020-04-16 DIAGNOSIS — F41.9 ANXIETY: Primary | ICD-10-CM

## 2020-04-16 DIAGNOSIS — I10 ESSENTIAL HYPERTENSION: ICD-10-CM

## 2020-04-16 PROCEDURE — 99283 EMERGENCY DEPT VISIT LOW MDM: CPT

## 2020-04-16 PROCEDURE — 99284 EMERGENCY DEPT VISIT MOD MDM: CPT | Performed by: EMERGENCY MEDICINE

## 2020-04-16 RX ORDER — CLONIDINE HYDROCHLORIDE 0.1 MG/1
TABLET ORAL
Qty: 60 TABLET | Refills: 0 | OUTPATIENT
Start: 2020-04-16

## 2020-04-16 RX ORDER — LITHIUM CARBONATE 150 MG/1
CAPSULE ORAL
Qty: 150 CAPSULE | Refills: 0 | OUTPATIENT
Start: 2020-04-16

## 2020-04-16 RX ORDER — LORAZEPAM 1 MG/1
1 TABLET ORAL ONCE
Status: COMPLETED | OUTPATIENT
Start: 2020-04-16 | End: 2020-04-16

## 2020-04-16 RX ORDER — ACETAMINOPHEN 325 MG/1
650 TABLET ORAL ONCE
Status: COMPLETED | OUTPATIENT
Start: 2020-04-16 | End: 2020-04-16

## 2020-04-16 RX ADMIN — LORAZEPAM 1 MG: 1 TABLET ORAL at 04:25

## 2020-04-16 RX ADMIN — ACETAMINOPHEN 650 MG: 325 TABLET ORAL at 08:57

## 2020-04-16 RX ADMIN — LORAZEPAM 1 MG: 1 TABLET ORAL at 08:56

## 2020-05-01 DIAGNOSIS — K21.9 GERD (GASTROESOPHAGEAL REFLUX DISEASE): ICD-10-CM

## 2020-05-04 RX ORDER — SUCRALFATE 1 G/1
TABLET ORAL
Qty: 30 TABLET | Refills: 0 | OUTPATIENT
Start: 2020-05-04

## 2020-05-11 ENCOUNTER — HOSPITAL ENCOUNTER (INPATIENT)
Facility: HOSPITAL | Age: 55
LOS: 2 days | Discharge: HOME | DRG: 897 | End: 2020-05-14
Attending: EMERGENCY MEDICINE | Admitting: FAMILY MEDICINE
Payer: MEDICARE

## 2020-05-11 DIAGNOSIS — R73.9 HYPERGLYCEMIA: ICD-10-CM

## 2020-05-11 DIAGNOSIS — E87.2 LACTIC ACIDOSIS: ICD-10-CM

## 2020-05-11 DIAGNOSIS — L30.9 DERMATITIS: ICD-10-CM

## 2020-05-11 DIAGNOSIS — E11.9 DIABETES MELLITUS, NEW ONSET (HCC): ICD-10-CM

## 2020-05-11 DIAGNOSIS — F10.20 ALCOHOL USE DISORDER, MODERATE, DEPENDENCE (HCC): Chronic | ICD-10-CM

## 2020-05-11 DIAGNOSIS — Z79.4 TYPE 2 DIABETES MELLITUS WITH HYPERGLYCEMIA, WITH LONG-TERM CURRENT USE OF INSULIN (HCC): ICD-10-CM

## 2020-05-11 DIAGNOSIS — E11.65 TYPE 2 DIABETES MELLITUS WITH HYPERGLYCEMIA, WITH LONG-TERM CURRENT USE OF INSULIN (HCC): ICD-10-CM

## 2020-05-11 DIAGNOSIS — D72.829 LEUKOCYTOSIS: ICD-10-CM

## 2020-05-11 DIAGNOSIS — R65.10 SIRS (SYSTEMIC INFLAMMATORY RESPONSE SYNDROME) (HCC): ICD-10-CM

## 2020-05-11 DIAGNOSIS — R11.2 NAUSEA AND VOMITING: ICD-10-CM

## 2020-05-11 DIAGNOSIS — R21 RASH OF GROIN: ICD-10-CM

## 2020-05-11 DIAGNOSIS — F10.920 ALCOHOLIC INTOXICATION WITHOUT COMPLICATION (HCC): Primary | ICD-10-CM

## 2020-05-11 DIAGNOSIS — E11.9 NEW ONSET TYPE 2 DIABETES MELLITUS (HCC): ICD-10-CM

## 2020-05-11 DIAGNOSIS — R00.0 TACHYCARDIA: ICD-10-CM

## 2020-05-11 PROCEDURE — 99285 EMERGENCY DEPT VISIT HI MDM: CPT | Performed by: EMERGENCY MEDICINE

## 2020-05-11 PROCEDURE — 93005 ELECTROCARDIOGRAM TRACING: CPT

## 2020-05-11 PROCEDURE — 99285 EMERGENCY DEPT VISIT HI MDM: CPT

## 2020-05-11 RX ORDER — LORAZEPAM 2 MG/ML
2 INJECTION INTRAMUSCULAR ONCE
Status: COMPLETED | OUTPATIENT
Start: 2020-05-12 | End: 2020-05-12

## 2020-05-11 RX ORDER — ONDANSETRON 2 MG/ML
4 INJECTION INTRAMUSCULAR; INTRAVENOUS ONCE
Status: COMPLETED | OUTPATIENT
Start: 2020-05-12 | End: 2020-05-12

## 2020-05-12 ENCOUNTER — APPOINTMENT (EMERGENCY)
Dept: RADIOLOGY | Facility: HOSPITAL | Age: 55
DRG: 897 | End: 2020-05-12
Payer: MEDICARE

## 2020-05-12 ENCOUNTER — APPOINTMENT (EMERGENCY)
Dept: CT IMAGING | Facility: HOSPITAL | Age: 55
DRG: 897 | End: 2020-05-12
Payer: MEDICARE

## 2020-05-12 PROBLEM — R21 RASH OF GROIN: Status: ACTIVE | Noted: 2020-05-12

## 2020-05-12 PROBLEM — F31.9 BIPOLAR DISORDER (HCC): Status: ACTIVE | Noted: 2020-05-12

## 2020-05-12 PROBLEM — R65.10 SIRS (SYSTEMIC INFLAMMATORY RESPONSE SYNDROME) (HCC): Status: ACTIVE | Noted: 2020-05-12

## 2020-05-12 LAB
ALBUMIN SERPL BCP-MCNC: 4.1 G/DL (ref 3.5–5)
ALP SERPL-CCNC: 145 U/L (ref 46–116)
ALT SERPL W P-5'-P-CCNC: 46 U/L (ref 12–78)
AMPHETAMINES SERPL QL SCN: NEGATIVE
ANION GAP SERPL CALCULATED.3IONS-SCNC: 10 MMOL/L (ref 4–13)
ANION GAP SERPL CALCULATED.3IONS-SCNC: 16 MMOL/L (ref 4–13)
APAP SERPL-MCNC: <2 UG/ML (ref 10–20)
APTT PPP: 26 SECONDS (ref 23–37)
AST SERPL W P-5'-P-CCNC: 17 U/L (ref 5–45)
ATRIAL RATE: 143 BPM
BACTERIA UR QL AUTO: ABNORMAL /HPF
BARBITURATES UR QL: NEGATIVE
BASOPHILS # BLD AUTO: 0.06 THOUSANDS/ΜL (ref 0–0.1)
BASOPHILS NFR BLD AUTO: 0 % (ref 0–1)
BENZODIAZ UR QL: NEGATIVE
BETA-HYDROXYBUTYRATE: 0.1 MMOL/L
BILIRUB SERPL-MCNC: 0.22 MG/DL (ref 0.2–1)
BILIRUB UR QL STRIP: NEGATIVE
BUN SERPL-MCNC: 11 MG/DL (ref 5–25)
BUN SERPL-MCNC: 7 MG/DL (ref 5–25)
CALCIUM SERPL-MCNC: 8 MG/DL (ref 8.3–10.1)
CALCIUM SERPL-MCNC: 9.1 MG/DL (ref 8.3–10.1)
CHLORIDE SERPL-SCNC: 102 MMOL/L (ref 100–108)
CHLORIDE SERPL-SCNC: 98 MMOL/L (ref 100–108)
CLARITY UR: CLEAR
CO2 SERPL-SCNC: 23 MMOL/L (ref 21–32)
CO2 SERPL-SCNC: 26 MMOL/L (ref 21–32)
COCAINE UR QL: NEGATIVE
COLOR UR: YELLOW
CREAT SERPL-MCNC: 0.85 MG/DL (ref 0.6–1.3)
CREAT SERPL-MCNC: 1.08 MG/DL (ref 0.6–1.3)
EOSINOPHIL # BLD AUTO: 0 THOUSAND/ΜL (ref 0–0.61)
EOSINOPHIL NFR BLD AUTO: 0 % (ref 0–6)
ERYTHROCYTE [DISTWIDTH] IN BLOOD BY AUTOMATED COUNT: 13.2 % (ref 11.6–15.1)
ERYTHROCYTE [DISTWIDTH] IN BLOOD BY AUTOMATED COUNT: 13.2 % (ref 11.6–15.1)
ETHANOL SERPL-MCNC: 156 MG/DL (ref 0–3)
GFR SERPL CREATININE-BSD FRML MDRD: 59 ML/MIN/1.73SQ M
GFR SERPL CREATININE-BSD FRML MDRD: 79 ML/MIN/1.73SQ M
GLUCOSE SERPL-MCNC: 192 MG/DL (ref 65–140)
GLUCOSE SERPL-MCNC: 389 MG/DL (ref 65–140)
GLUCOSE UR STRIP-MCNC: ABNORMAL MG/DL
HCT VFR BLD AUTO: 38.4 % (ref 34.8–46.1)
HCT VFR BLD AUTO: 43.4 % (ref 34.8–46.1)
HGB BLD-MCNC: 12.8 G/DL (ref 11.5–15.4)
HGB BLD-MCNC: 14.7 G/DL (ref 11.5–15.4)
HGB UR QL STRIP.AUTO: NEGATIVE
HYALINE CASTS #/AREA URNS LPF: ABNORMAL /LPF
IMM GRANULOCYTES # BLD AUTO: 0.13 THOUSAND/UL (ref 0–0.2)
IMM GRANULOCYTES NFR BLD AUTO: 1 % (ref 0–2)
INR PPP: 0.97 (ref 0.84–1.19)
KETONES UR STRIP-MCNC: NEGATIVE MG/DL
LACTATE SERPL-SCNC: 3.8 MMOL/L (ref 0.5–2)
LACTATE SERPL-SCNC: 4 MMOL/L (ref 0.5–2)
LACTATE SERPL-SCNC: 4.3 MMOL/L (ref 0.5–2)
LACTATE SERPL-SCNC: 5.6 MMOL/L (ref 0.5–2)
LACTATE SERPL-SCNC: 7.2 MMOL/L (ref 0.5–2)
LEUKOCYTE ESTERASE UR QL STRIP: NEGATIVE
LYMPHOCYTES # BLD AUTO: 2.27 THOUSANDS/ΜL (ref 0.6–4.47)
LYMPHOCYTES NFR BLD AUTO: 10 % (ref 14–44)
MAGNESIUM SERPL-MCNC: 1.9 MG/DL (ref 1.6–2.6)
MCH RBC QN AUTO: 32.3 PG (ref 26.8–34.3)
MCH RBC QN AUTO: 32.6 PG (ref 26.8–34.3)
MCHC RBC AUTO-ENTMCNC: 33.3 G/DL (ref 31.4–37.4)
MCHC RBC AUTO-ENTMCNC: 33.9 G/DL (ref 31.4–37.4)
MCV RBC AUTO: 95 FL (ref 82–98)
MCV RBC AUTO: 98 FL (ref 82–98)
METHADONE UR QL: NEGATIVE
MONOCYTES # BLD AUTO: 2.61 THOUSAND/ΜL (ref 0.17–1.22)
MONOCYTES NFR BLD AUTO: 12 % (ref 4–12)
NEUTROPHILS # BLD AUTO: 16.66 THOUSANDS/ΜL (ref 1.85–7.62)
NEUTS SEG NFR BLD AUTO: 77 % (ref 43–75)
NITRITE UR QL STRIP: NEGATIVE
NON-SQ EPI CELLS URNS QL MICRO: ABNORMAL /HPF
NRBC BLD AUTO-RTO: 0 /100 WBCS
OPIATES UR QL SCN: NEGATIVE
P AXIS: 61 DEGREES
PCP UR QL: NEGATIVE
PH UR STRIP.AUTO: 5 [PH] (ref 4.5–8)
PLATELET # BLD AUTO: 370 THOUSANDS/UL (ref 149–390)
PLATELET # BLD AUTO: 509 THOUSANDS/UL (ref 149–390)
PMV BLD AUTO: 9.1 FL (ref 8.9–12.7)
PMV BLD AUTO: 9.3 FL (ref 8.9–12.7)
POTASSIUM SERPL-SCNC: 3.6 MMOL/L (ref 3.5–5.3)
POTASSIUM SERPL-SCNC: 4.4 MMOL/L (ref 3.5–5.3)
PR INTERVAL: 122 MS
PROT SERPL-MCNC: 8.1 G/DL (ref 6.4–8.2)
PROT UR STRIP-MCNC: ABNORMAL MG/DL
PROTHROMBIN TIME: 13 SECONDS (ref 11.6–14.5)
QRS AXIS: 65 DEGREES
QRSD INTERVAL: 62 MS
QT INTERVAL: 268 MS
QTC INTERVAL: 413 MS
RBC # BLD AUTO: 3.93 MILLION/UL (ref 3.81–5.12)
RBC # BLD AUTO: 4.55 MILLION/UL (ref 3.81–5.12)
RBC #/AREA URNS AUTO: ABNORMAL /HPF
SALICYLATES SERPL-MCNC: 5.2 MG/DL (ref 3–20)
SODIUM SERPL-SCNC: 137 MMOL/L (ref 136–145)
SODIUM SERPL-SCNC: 138 MMOL/L (ref 136–145)
SP GR UR STRIP.AUTO: >=1.03 (ref 1–1.03)
T WAVE AXIS: 52 DEGREES
THC UR QL: NEGATIVE
TROPONIN I SERPL-MCNC: <0.02 NG/ML
UROBILINOGEN UR QL STRIP.AUTO: 0.2 E.U./DL
VENTRICULAR RATE: 143 BPM
WBC # BLD AUTO: 13.77 THOUSAND/UL (ref 4.31–10.16)
WBC # BLD AUTO: 21.73 THOUSAND/UL (ref 4.31–10.16)
WBC #/AREA URNS AUTO: ABNORMAL /HPF

## 2020-05-12 PROCEDURE — 36415 COLL VENOUS BLD VENIPUNCTURE: CPT | Performed by: EMERGENCY MEDICINE

## 2020-05-12 PROCEDURE — 94762 N-INVAS EAR/PLS OXIMTRY CONT: CPT

## 2020-05-12 PROCEDURE — 82010 KETONE BODYS QUAN: CPT | Performed by: EMERGENCY MEDICINE

## 2020-05-12 PROCEDURE — 87493 C DIFF AMPLIFIED PROBE: CPT | Performed by: PHYSICIAN ASSISTANT

## 2020-05-12 PROCEDURE — 99223 1ST HOSP IP/OBS HIGH 75: CPT | Performed by: FAMILY MEDICINE

## 2020-05-12 PROCEDURE — 85610 PROTHROMBIN TIME: CPT | Performed by: EMERGENCY MEDICINE

## 2020-05-12 PROCEDURE — 85730 THROMBOPLASTIN TIME PARTIAL: CPT | Performed by: EMERGENCY MEDICINE

## 2020-05-12 PROCEDURE — 80320 DRUG SCREEN QUANTALCOHOLS: CPT | Performed by: EMERGENCY MEDICINE

## 2020-05-12 PROCEDURE — 96375 TX/PRO/DX INJ NEW DRUG ADDON: CPT

## 2020-05-12 PROCEDURE — 74177 CT ABD & PELVIS W/CONTRAST: CPT

## 2020-05-12 PROCEDURE — 80048 BASIC METABOLIC PNL TOTAL CA: CPT | Performed by: PHYSICIAN ASSISTANT

## 2020-05-12 PROCEDURE — 80053 COMPREHEN METABOLIC PANEL: CPT | Performed by: EMERGENCY MEDICINE

## 2020-05-12 PROCEDURE — 71260 CT THORAX DX C+: CPT

## 2020-05-12 PROCEDURE — 80329 ANALGESICS NON-OPIOID 1 OR 2: CPT | Performed by: EMERGENCY MEDICINE

## 2020-05-12 PROCEDURE — 80307 DRUG TEST PRSMV CHEM ANLYZR: CPT | Performed by: EMERGENCY MEDICINE

## 2020-05-12 PROCEDURE — 87505 NFCT AGENT DETECTION GI: CPT | Performed by: FAMILY MEDICINE

## 2020-05-12 PROCEDURE — 85025 COMPLETE CBC W/AUTO DIFF WBC: CPT | Performed by: EMERGENCY MEDICINE

## 2020-05-12 PROCEDURE — 71045 X-RAY EXAM CHEST 1 VIEW: CPT

## 2020-05-12 PROCEDURE — 83605 ASSAY OF LACTIC ACID: CPT | Performed by: EMERGENCY MEDICINE

## 2020-05-12 PROCEDURE — 87040 BLOOD CULTURE FOR BACTERIA: CPT | Performed by: EMERGENCY MEDICINE

## 2020-05-12 PROCEDURE — 85027 COMPLETE CBC AUTOMATED: CPT | Performed by: PHYSICIAN ASSISTANT

## 2020-05-12 PROCEDURE — 96361 HYDRATE IV INFUSION ADD-ON: CPT

## 2020-05-12 PROCEDURE — 93010 ELECTROCARDIOGRAM REPORT: CPT | Performed by: INTERNAL MEDICINE

## 2020-05-12 PROCEDURE — 81001 URINALYSIS AUTO W/SCOPE: CPT

## 2020-05-12 PROCEDURE — 84484 ASSAY OF TROPONIN QUANT: CPT | Performed by: EMERGENCY MEDICINE

## 2020-05-12 PROCEDURE — 83605 ASSAY OF LACTIC ACID: CPT | Performed by: FAMILY MEDICINE

## 2020-05-12 PROCEDURE — 96374 THER/PROPH/DIAG INJ IV PUSH: CPT

## 2020-05-12 PROCEDURE — 83735 ASSAY OF MAGNESIUM: CPT | Performed by: EMERGENCY MEDICINE

## 2020-05-12 RX ORDER — LORAZEPAM 1 MG/1
2 TABLET ORAL ONCE
Status: COMPLETED | OUTPATIENT
Start: 2020-05-12 | End: 2020-05-12

## 2020-05-12 RX ORDER — LITHIUM CARBONATE 300 MG/1
300 CAPSULE ORAL DAILY
Status: DISCONTINUED | OUTPATIENT
Start: 2020-05-12 | End: 2020-05-14 | Stop reason: HOSPADM

## 2020-05-12 RX ORDER — FOLIC ACID 1 MG/1
1 TABLET ORAL DAILY
Status: DISCONTINUED | OUTPATIENT
Start: 2020-05-12 | End: 2020-05-14 | Stop reason: HOSPADM

## 2020-05-12 RX ORDER — GABAPENTIN 300 MG/1
600 CAPSULE ORAL 4 TIMES DAILY
Status: DISCONTINUED | OUTPATIENT
Start: 2020-05-12 | End: 2020-05-14 | Stop reason: HOSPADM

## 2020-05-12 RX ORDER — ONDANSETRON 2 MG/ML
4 INJECTION INTRAMUSCULAR; INTRAVENOUS EVERY 6 HOURS PRN
Status: DISCONTINUED | OUTPATIENT
Start: 2020-05-12 | End: 2020-05-14 | Stop reason: HOSPADM

## 2020-05-12 RX ORDER — CALCIUM CARBONATE 200(500)MG
500 TABLET,CHEWABLE ORAL 3 TIMES DAILY PRN
Status: DISCONTINUED | OUTPATIENT
Start: 2020-05-12 | End: 2020-05-14 | Stop reason: HOSPADM

## 2020-05-12 RX ORDER — LOSARTAN POTASSIUM 50 MG/1
100 TABLET ORAL DAILY
Status: DISCONTINUED | OUTPATIENT
Start: 2020-05-12 | End: 2020-05-14 | Stop reason: HOSPADM

## 2020-05-12 RX ORDER — AMLODIPINE BESYLATE 5 MG/1
5 TABLET ORAL DAILY
Status: DISCONTINUED | OUTPATIENT
Start: 2020-05-12 | End: 2020-05-14 | Stop reason: HOSPADM

## 2020-05-12 RX ORDER — BUSPIRONE HYDROCHLORIDE 10 MG/1
15 TABLET ORAL 2 TIMES DAILY
Status: DISCONTINUED | OUTPATIENT
Start: 2020-05-12 | End: 2020-05-14 | Stop reason: HOSPADM

## 2020-05-12 RX ORDER — ONDANSETRON 2 MG/ML
4 INJECTION INTRAMUSCULAR; INTRAVENOUS ONCE AS NEEDED
Status: DISCONTINUED | OUTPATIENT
Start: 2020-05-12 | End: 2020-05-12

## 2020-05-12 RX ORDER — NYSTATIN 100000 [USP'U]/G
POWDER TOPICAL 2 TIMES DAILY
Status: DISCONTINUED | OUTPATIENT
Start: 2020-05-12 | End: 2020-05-14 | Stop reason: HOSPADM

## 2020-05-12 RX ORDER — THIAMINE MONONITRATE (VIT B1) 100 MG
100 TABLET ORAL DAILY
Status: DISCONTINUED | OUTPATIENT
Start: 2020-05-12 | End: 2020-05-14 | Stop reason: HOSPADM

## 2020-05-12 RX ORDER — ACETAMINOPHEN 325 MG/1
650 TABLET ORAL EVERY 6 HOURS PRN
Status: DISCONTINUED | OUTPATIENT
Start: 2020-05-12 | End: 2020-05-14 | Stop reason: HOSPADM

## 2020-05-12 RX ORDER — NICOTINE 21 MG/24HR
1 PATCH, TRANSDERMAL 24 HOURS TRANSDERMAL DAILY
Status: DISCONTINUED | OUTPATIENT
Start: 2020-05-12 | End: 2020-05-14 | Stop reason: HOSPADM

## 2020-05-12 RX ORDER — SUCRALFATE 1 G/1
1 TABLET ORAL
Status: DISCONTINUED | OUTPATIENT
Start: 2020-05-12 | End: 2020-05-14 | Stop reason: HOSPADM

## 2020-05-12 RX ORDER — SODIUM CHLORIDE 9 MG/ML
125 INJECTION, SOLUTION INTRAVENOUS CONTINUOUS
Status: DISCONTINUED | OUTPATIENT
Start: 2020-05-12 | End: 2020-05-14

## 2020-05-12 RX ORDER — PANTOPRAZOLE SODIUM 40 MG/1
40 TABLET, DELAYED RELEASE ORAL
Status: DISCONTINUED | OUTPATIENT
Start: 2020-05-12 | End: 2020-05-14 | Stop reason: HOSPADM

## 2020-05-12 RX ORDER — CLONIDINE HYDROCHLORIDE 0.1 MG/1
0.1 TABLET ORAL 2 TIMES DAILY
Status: DISCONTINUED | OUTPATIENT
Start: 2020-05-12 | End: 2020-05-14 | Stop reason: HOSPADM

## 2020-05-12 RX ORDER — ACETAMINOPHEN 325 MG/1
650 TABLET ORAL ONCE AS NEEDED
Status: COMPLETED | OUTPATIENT
Start: 2020-05-12 | End: 2020-05-12

## 2020-05-12 RX ORDER — ACETAMINOPHEN 325 MG/1
975 TABLET ORAL ONCE
Status: COMPLETED | OUTPATIENT
Start: 2020-05-12 | End: 2020-05-12

## 2020-05-12 RX ORDER — NALTREXONE HYDROCHLORIDE 50 MG/1
50 TABLET, FILM COATED ORAL DAILY
Status: DISCONTINUED | OUTPATIENT
Start: 2020-05-12 | End: 2020-05-14 | Stop reason: HOSPADM

## 2020-05-12 RX ADMIN — ENOXAPARIN SODIUM 40 MG: 40 INJECTION SUBCUTANEOUS at 09:32

## 2020-05-12 RX ADMIN — NYSTATIN: 100000 POWDER TOPICAL at 17:47

## 2020-05-12 RX ADMIN — GABAPENTIN 600 MG: 300 CAPSULE ORAL at 21:39

## 2020-05-12 RX ADMIN — CLONIDINE HYDROCHLORIDE 0.1 MG: 0.1 TABLET ORAL at 17:42

## 2020-05-12 RX ADMIN — GABAPENTIN 600 MG: 300 CAPSULE ORAL at 17:42

## 2020-05-12 RX ADMIN — SUCRALFATE 1 G: 1 TABLET ORAL at 13:00

## 2020-05-12 RX ADMIN — IOHEXOL 100 ML: 350 INJECTION, SOLUTION INTRAVENOUS at 02:40

## 2020-05-12 RX ADMIN — SUCRALFATE 1 G: 1 TABLET ORAL at 09:34

## 2020-05-12 RX ADMIN — LORAZEPAM 2 MG: 2 INJECTION, SOLUTION INTRAMUSCULAR; INTRAVENOUS at 00:04

## 2020-05-12 RX ADMIN — ACETAMINOPHEN 975 MG: 325 TABLET ORAL at 02:08

## 2020-05-12 RX ADMIN — LORAZEPAM 2 MG: 1 TABLET ORAL at 05:30

## 2020-05-12 RX ADMIN — PANTOPRAZOLE SODIUM 40 MG: 40 TABLET, DELAYED RELEASE ORAL at 09:35

## 2020-05-12 RX ADMIN — FOLIC ACID 1 MG: 1 TABLET ORAL at 09:35

## 2020-05-12 RX ADMIN — SODIUM CHLORIDE 1000 ML: 0.9 INJECTION, SOLUTION INTRAVENOUS at 00:04

## 2020-05-12 RX ADMIN — LOSARTAN POTASSIUM 100 MG: 50 TABLET, FILM COATED ORAL at 09:33

## 2020-05-12 RX ADMIN — NALTREXONE HYDROCHLORIDE 50 MG: 50 TABLET, FILM COATED ORAL at 09:32

## 2020-05-12 RX ADMIN — SUCRALFATE 1 G: 1 TABLET ORAL at 21:39

## 2020-05-12 RX ADMIN — ONDANSETRON 4 MG: 2 INJECTION INTRAMUSCULAR; INTRAVENOUS at 00:04

## 2020-05-12 RX ADMIN — SODIUM CHLORIDE 125 ML/HR: 0.9 INJECTION, SOLUTION INTRAVENOUS at 04:02

## 2020-05-12 RX ADMIN — AMLODIPINE BESYLATE 5 MG: 5 TABLET ORAL at 09:34

## 2020-05-12 RX ADMIN — LURASIDONE HYDROCHLORIDE 80 MG: 40 TABLET, FILM COATED ORAL at 17:41

## 2020-05-12 RX ADMIN — GABAPENTIN 600 MG: 300 CAPSULE ORAL at 09:34

## 2020-05-12 RX ADMIN — LORAZEPAM 2 MG: 1 TABLET ORAL at 23:12

## 2020-05-12 RX ADMIN — THIAMINE HCL TAB 100 MG 100 MG: 100 TAB at 09:35

## 2020-05-12 RX ADMIN — ACETAMINOPHEN 650 MG: 325 TABLET ORAL at 04:19

## 2020-05-12 RX ADMIN — ACETAMINOPHEN 650 MG: 325 TABLET ORAL at 22:37

## 2020-05-12 RX ADMIN — CLONIDINE HYDROCHLORIDE 0.1 MG: 0.1 TABLET ORAL at 09:35

## 2020-05-12 RX ADMIN — SODIUM CHLORIDE 600 ML: 0.9 INJECTION, SOLUTION INTRAVENOUS at 01:45

## 2020-05-12 RX ADMIN — NICOTINE 1 PATCH: 21 PATCH TRANSDERMAL at 09:32

## 2020-05-12 RX ADMIN — NYSTATIN: 100000 POWDER TOPICAL at 09:40

## 2020-05-12 RX ADMIN — METOPROLOL TARTRATE 12.5 MG: 25 TABLET ORAL at 21:39

## 2020-05-12 RX ADMIN — Medication 1 TABLET: at 09:32

## 2020-05-12 RX ADMIN — METOPROLOL TARTRATE 12.5 MG: 25 TABLET ORAL at 09:33

## 2020-05-12 RX ADMIN — GABAPENTIN 600 MG: 300 CAPSULE ORAL at 13:00

## 2020-05-12 RX ADMIN — LORAZEPAM 2 MG: 1 TABLET ORAL at 10:10

## 2020-05-12 RX ADMIN — BUSPIRONE HYDROCHLORIDE 15 MG: 10 TABLET ORAL at 17:42

## 2020-05-12 RX ADMIN — LITHIUM CARBONATE 300 MG: 300 CAPSULE, GELATIN COATED ORAL at 09:32

## 2020-05-12 RX ADMIN — ACETAMINOPHEN 650 MG: 325 TABLET ORAL at 10:08

## 2020-05-12 RX ADMIN — SUCRALFATE 1 G: 1 TABLET ORAL at 17:42

## 2020-05-12 RX ADMIN — LITHIUM CARBONATE 450 MG: 300 CAPSULE, GELATIN COATED ORAL at 21:39

## 2020-05-12 RX ADMIN — BUSPIRONE HYDROCHLORIDE 15 MG: 10 TABLET ORAL at 09:33

## 2020-05-13 PROBLEM — R19.7 DIARRHEA: Status: ACTIVE | Noted: 2020-05-13

## 2020-05-13 PROBLEM — R73.9 HYPERGLYCEMIA: Status: ACTIVE | Noted: 2020-05-13

## 2020-05-13 LAB
ALBUMIN SERPL BCP-MCNC: 3 G/DL (ref 3.5–5)
ALP SERPL-CCNC: 134 U/L (ref 46–116)
ALT SERPL W P-5'-P-CCNC: 29 U/L (ref 12–78)
ANION GAP SERPL CALCULATED.3IONS-SCNC: 8 MMOL/L (ref 4–13)
AST SERPL W P-5'-P-CCNC: 14 U/L (ref 5–45)
BASOPHILS # BLD AUTO: 0.02 THOUSANDS/ΜL (ref 0–0.1)
BASOPHILS NFR BLD AUTO: 0 % (ref 0–1)
BILIRUB SERPL-MCNC: 0.55 MG/DL (ref 0.2–1)
BUN SERPL-MCNC: 6 MG/DL (ref 5–25)
C DIFF TOX GENS STL QL NAA+PROBE: NEGATIVE
CALCIUM SERPL-MCNC: 8.1 MG/DL (ref 8.3–10.1)
CAMPYLOBACTER DNA SPEC NAA+PROBE: NORMAL
CHLORIDE SERPL-SCNC: 105 MMOL/L (ref 100–108)
CO2 SERPL-SCNC: 25 MMOL/L (ref 21–32)
CREAT SERPL-MCNC: 0.97 MG/DL (ref 0.6–1.3)
EOSINOPHIL # BLD AUTO: 0.04 THOUSAND/ΜL (ref 0–0.61)
EOSINOPHIL NFR BLD AUTO: 0 % (ref 0–6)
ERYTHROCYTE [DISTWIDTH] IN BLOOD BY AUTOMATED COUNT: 13.1 % (ref 11.6–15.1)
GFR SERPL CREATININE-BSD FRML MDRD: 67 ML/MIN/1.73SQ M
GLUCOSE SERPL-MCNC: 149 MG/DL (ref 65–140)
GLUCOSE SERPL-MCNC: 150 MG/DL (ref 65–140)
GLUCOSE SERPL-MCNC: 224 MG/DL (ref 65–140)
HCT VFR BLD AUTO: 38 % (ref 34.8–46.1)
HGB BLD-MCNC: 12.4 G/DL (ref 11.5–15.4)
IMM GRANULOCYTES # BLD AUTO: 0.04 THOUSAND/UL (ref 0–0.2)
IMM GRANULOCYTES NFR BLD AUTO: 0 % (ref 0–2)
LACTATE SERPL-SCNC: 2.2 MMOL/L (ref 0.5–2)
LYMPHOCYTES # BLD AUTO: 2.72 THOUSANDS/ΜL (ref 0.6–4.47)
LYMPHOCYTES NFR BLD AUTO: 28 % (ref 14–44)
MAGNESIUM SERPL-MCNC: 2.2 MG/DL (ref 1.6–2.6)
MCH RBC QN AUTO: 32.2 PG (ref 26.8–34.3)
MCHC RBC AUTO-ENTMCNC: 32.6 G/DL (ref 31.4–37.4)
MCV RBC AUTO: 99 FL (ref 82–98)
MONOCYTES # BLD AUTO: 1.09 THOUSAND/ΜL (ref 0.17–1.22)
MONOCYTES NFR BLD AUTO: 11 % (ref 4–12)
NEUTROPHILS # BLD AUTO: 5.83 THOUSANDS/ΜL (ref 1.85–7.62)
NEUTS SEG NFR BLD AUTO: 61 % (ref 43–75)
NRBC BLD AUTO-RTO: 0 /100 WBCS
PLATELET # BLD AUTO: 325 THOUSANDS/UL (ref 149–390)
PMV BLD AUTO: 9.4 FL (ref 8.9–12.7)
POTASSIUM SERPL-SCNC: 3.6 MMOL/L (ref 3.5–5.3)
PROT SERPL-MCNC: 6.6 G/DL (ref 6.4–8.2)
RBC # BLD AUTO: 3.85 MILLION/UL (ref 3.81–5.12)
SALMONELLA DNA SPEC QL NAA+PROBE: NORMAL
SHIGA TOXIN STX GENE SPEC NAA+PROBE: NORMAL
SHIGELLA DNA SPEC QL NAA+PROBE: NORMAL
SODIUM SERPL-SCNC: 138 MMOL/L (ref 136–145)
TSH SERPL DL<=0.05 MIU/L-ACNC: 4.93 UIU/ML (ref 0.36–3.74)
WBC # BLD AUTO: 9.74 THOUSAND/UL (ref 4.31–10.16)

## 2020-05-13 PROCEDURE — 82948 REAGENT STRIP/BLOOD GLUCOSE: CPT

## 2020-05-13 PROCEDURE — 99232 SBSQ HOSP IP/OBS MODERATE 35: CPT | Performed by: FAMILY MEDICINE

## 2020-05-13 PROCEDURE — 84443 ASSAY THYROID STIM HORMONE: CPT | Performed by: FAMILY MEDICINE

## 2020-05-13 PROCEDURE — 84439 ASSAY OF FREE THYROXINE: CPT | Performed by: FAMILY MEDICINE

## 2020-05-13 PROCEDURE — 83735 ASSAY OF MAGNESIUM: CPT | Performed by: FAMILY MEDICINE

## 2020-05-13 PROCEDURE — 85025 COMPLETE CBC W/AUTO DIFF WBC: CPT | Performed by: FAMILY MEDICINE

## 2020-05-13 PROCEDURE — 83605 ASSAY OF LACTIC ACID: CPT | Performed by: FAMILY MEDICINE

## 2020-05-13 PROCEDURE — 80053 COMPREHEN METABOLIC PANEL: CPT | Performed by: FAMILY MEDICINE

## 2020-05-13 RX ORDER — HYDROXYZINE HYDROCHLORIDE 25 MG/1
50 TABLET, FILM COATED ORAL EVERY 6 HOURS PRN
Status: DISCONTINUED | OUTPATIENT
Start: 2020-05-13 | End: 2020-05-14 | Stop reason: HOSPADM

## 2020-05-13 RX ADMIN — HYDROXYZINE HYDROCHLORIDE 50 MG: 25 TABLET ORAL at 23:29

## 2020-05-13 RX ADMIN — SUCRALFATE 1 G: 1 TABLET ORAL at 06:34

## 2020-05-13 RX ADMIN — SODIUM CHLORIDE 125 ML/HR: 0.9 INJECTION, SOLUTION INTRAVENOUS at 07:54

## 2020-05-13 RX ADMIN — GABAPENTIN 600 MG: 300 CAPSULE ORAL at 09:10

## 2020-05-13 RX ADMIN — METOPROLOL TARTRATE 12.5 MG: 25 TABLET ORAL at 21:14

## 2020-05-13 RX ADMIN — CLONIDINE HYDROCHLORIDE 0.1 MG: 0.1 TABLET ORAL at 17:24

## 2020-05-13 RX ADMIN — SODIUM CHLORIDE 125 ML/HR: 0.9 INJECTION, SOLUTION INTRAVENOUS at 18:47

## 2020-05-13 RX ADMIN — GABAPENTIN 600 MG: 300 CAPSULE ORAL at 21:14

## 2020-05-13 RX ADMIN — LITHIUM CARBONATE 450 MG: 300 CAPSULE, GELATIN COATED ORAL at 21:13

## 2020-05-13 RX ADMIN — NYSTATIN: 100000 POWDER TOPICAL at 09:10

## 2020-05-13 RX ADMIN — AMLODIPINE BESYLATE 5 MG: 5 TABLET ORAL at 09:09

## 2020-05-13 RX ADMIN — LITHIUM CARBONATE 300 MG: 300 CAPSULE, GELATIN COATED ORAL at 09:12

## 2020-05-13 RX ADMIN — HYDROXYZINE HYDROCHLORIDE 50 MG: 25 TABLET ORAL at 06:00

## 2020-05-13 RX ADMIN — METOPROLOL TARTRATE 12.5 MG: 25 TABLET ORAL at 09:08

## 2020-05-13 RX ADMIN — Medication 1 TABLET: at 09:10

## 2020-05-13 RX ADMIN — LURASIDONE HYDROCHLORIDE 80 MG: 40 TABLET, FILM COATED ORAL at 16:24

## 2020-05-13 RX ADMIN — NYSTATIN: 100000 POWDER TOPICAL at 17:25

## 2020-05-13 RX ADMIN — CLONIDINE HYDROCHLORIDE 0.1 MG: 0.1 TABLET ORAL at 09:09

## 2020-05-13 RX ADMIN — FOLIC ACID 1 MG: 1 TABLET ORAL at 09:09

## 2020-05-13 RX ADMIN — PANTOPRAZOLE SODIUM 40 MG: 40 TABLET, DELAYED RELEASE ORAL at 06:34

## 2020-05-13 RX ADMIN — GABAPENTIN 600 MG: 300 CAPSULE ORAL at 17:23

## 2020-05-13 RX ADMIN — SUCRALFATE 1 G: 1 TABLET ORAL at 21:19

## 2020-05-13 RX ADMIN — HYDROXYZINE HYDROCHLORIDE 50 MG: 25 TABLET ORAL at 17:24

## 2020-05-13 RX ADMIN — INSULIN LISPRO 1 UNITS: 100 INJECTION, SOLUTION INTRAVENOUS; SUBCUTANEOUS at 16:25

## 2020-05-13 RX ADMIN — BUSPIRONE HYDROCHLORIDE 15 MG: 10 TABLET ORAL at 09:09

## 2020-05-13 RX ADMIN — THIAMINE HCL TAB 100 MG 100 MG: 100 TAB at 09:09

## 2020-05-13 RX ADMIN — SUCRALFATE 1 G: 1 TABLET ORAL at 16:24

## 2020-05-13 RX ADMIN — BUSPIRONE HYDROCHLORIDE 15 MG: 10 TABLET ORAL at 17:23

## 2020-05-13 RX ADMIN — LOSARTAN POTASSIUM 100 MG: 50 TABLET, FILM COATED ORAL at 09:09

## 2020-05-13 RX ADMIN — CALCIUM CARBONATE (ANTACID) CHEW TAB 500 MG 500 MG: 500 CHEW TAB at 17:22

## 2020-05-13 RX ADMIN — SUCRALFATE 1 G: 1 TABLET ORAL at 12:20

## 2020-05-13 RX ADMIN — ENOXAPARIN SODIUM 40 MG: 40 INJECTION SUBCUTANEOUS at 09:08

## 2020-05-13 RX ADMIN — HYDROXYZINE HYDROCHLORIDE 50 MG: 25 TABLET ORAL at 12:17

## 2020-05-13 RX ADMIN — GABAPENTIN 600 MG: 300 CAPSULE ORAL at 12:20

## 2020-05-13 RX ADMIN — NICOTINE 1 PATCH: 21 PATCH TRANSDERMAL at 09:10

## 2020-05-13 RX ADMIN — NALTREXONE HYDROCHLORIDE 50 MG: 50 TABLET, FILM COATED ORAL at 09:12

## 2020-05-14 VITALS
SYSTOLIC BLOOD PRESSURE: 157 MMHG | DIASTOLIC BLOOD PRESSURE: 95 MMHG | RESPIRATION RATE: 20 BRPM | HEART RATE: 86 BPM | TEMPERATURE: 98.4 F | OXYGEN SATURATION: 97 %

## 2020-05-14 PROBLEM — E11.9 NEW ONSET TYPE 2 DIABETES MELLITUS (HCC): Status: ACTIVE | Noted: 2020-05-14

## 2020-05-14 LAB
EST. AVERAGE GLUCOSE BLD GHB EST-MCNC: 154 MG/DL
GLUCOSE SERPL-MCNC: 225 MG/DL (ref 65–140)
HBA1C MFR BLD: 7 %
T4 FREE SERPL-MCNC: 0.91 NG/DL (ref 0.76–1.46)

## 2020-05-14 PROCEDURE — 82948 REAGENT STRIP/BLOOD GLUCOSE: CPT

## 2020-05-14 PROCEDURE — 99239 HOSP IP/OBS DSCHRG MGMT >30: CPT | Performed by: FAMILY MEDICINE

## 2020-05-14 PROCEDURE — 83036 HEMOGLOBIN GLYCOSYLATED A1C: CPT | Performed by: FAMILY MEDICINE

## 2020-05-14 RX ORDER — FOLIC ACID 1 MG/1
1 TABLET ORAL DAILY
Qty: 30 TABLET | Refills: 0 | Status: SHIPPED | OUTPATIENT
Start: 2020-05-15 | End: 2020-08-28 | Stop reason: HOSPADM

## 2020-05-14 RX ORDER — GLIPIZIDE 5 MG/1
5 TABLET, FILM COATED, EXTENDED RELEASE ORAL DAILY
Qty: 30 TABLET | Refills: 0 | Status: SHIPPED | OUTPATIENT
Start: 2020-05-14 | End: 2020-08-03 | Stop reason: HOSPADM

## 2020-05-14 RX ORDER — NYSTATIN 100000 [USP'U]/G
POWDER TOPICAL 2 TIMES DAILY
Qty: 15 G | Refills: 0 | Status: SHIPPED | OUTPATIENT
Start: 2020-05-14 | End: 2020-08-03 | Stop reason: HOSPADM

## 2020-05-14 RX ADMIN — THIAMINE HCL TAB 100 MG 100 MG: 100 TAB at 08:28

## 2020-05-14 RX ADMIN — ENOXAPARIN SODIUM 40 MG: 40 INJECTION SUBCUTANEOUS at 08:23

## 2020-05-14 RX ADMIN — NICOTINE 1 PATCH: 21 PATCH TRANSDERMAL at 08:22

## 2020-05-14 RX ADMIN — LOSARTAN POTASSIUM 100 MG: 50 TABLET, FILM COATED ORAL at 08:28

## 2020-05-14 RX ADMIN — ACETAMINOPHEN 650 MG: 325 TABLET ORAL at 06:19

## 2020-05-14 RX ADMIN — INSULIN LISPRO 2 UNITS: 100 INJECTION, SOLUTION INTRAVENOUS; SUBCUTANEOUS at 08:25

## 2020-05-14 RX ADMIN — Medication 1 TABLET: at 08:28

## 2020-05-14 RX ADMIN — ACETAMINOPHEN 650 MG: 325 TABLET ORAL at 00:35

## 2020-05-14 RX ADMIN — SODIUM CHLORIDE 125 ML/HR: 0.9 INJECTION, SOLUTION INTRAVENOUS at 03:30

## 2020-05-14 RX ADMIN — CLONIDINE HYDROCHLORIDE 0.1 MG: 0.1 TABLET ORAL at 08:28

## 2020-05-14 RX ADMIN — BUSPIRONE HYDROCHLORIDE 15 MG: 10 TABLET ORAL at 08:27

## 2020-05-14 RX ADMIN — METOPROLOL TARTRATE 12.5 MG: 25 TABLET ORAL at 08:28

## 2020-05-14 RX ADMIN — HYDROXYZINE HYDROCHLORIDE 50 MG: 25 TABLET ORAL at 06:18

## 2020-05-14 RX ADMIN — SUCRALFATE 1 G: 1 TABLET ORAL at 06:19

## 2020-05-14 RX ADMIN — LITHIUM CARBONATE 300 MG: 300 CAPSULE, GELATIN COATED ORAL at 08:28

## 2020-05-14 RX ADMIN — NALTREXONE HYDROCHLORIDE 50 MG: 50 TABLET, FILM COATED ORAL at 08:28

## 2020-05-14 RX ADMIN — PANTOPRAZOLE SODIUM 40 MG: 40 TABLET, DELAYED RELEASE ORAL at 06:19

## 2020-05-14 RX ADMIN — NYSTATIN 1 APPLICATION: 100000 POWDER TOPICAL at 08:28

## 2020-05-14 RX ADMIN — AMLODIPINE BESYLATE 5 MG: 5 TABLET ORAL at 08:28

## 2020-05-14 RX ADMIN — GABAPENTIN 600 MG: 300 CAPSULE ORAL at 08:27

## 2020-05-14 RX ADMIN — FOLIC ACID 1 MG: 1 TABLET ORAL at 08:28

## 2020-05-17 LAB
BACTERIA BLD CULT: NORMAL
BACTERIA BLD CULT: NORMAL

## 2020-05-20 DIAGNOSIS — I10 ESSENTIAL HYPERTENSION: ICD-10-CM

## 2020-05-20 RX ORDER — LOSARTAN POTASSIUM 100 MG/1
TABLET ORAL
Qty: 30 TABLET | Refills: 0 | OUTPATIENT
Start: 2020-05-20

## 2020-07-09 DIAGNOSIS — I10 ESSENTIAL HYPERTENSION: ICD-10-CM

## 2020-07-09 RX ORDER — AMLODIPINE BESYLATE 5 MG/1
TABLET ORAL
Qty: 30 TABLET | Refills: 0 | OUTPATIENT
Start: 2020-07-09

## 2020-07-15 ENCOUNTER — HOSPITAL ENCOUNTER (EMERGENCY)
Facility: HOSPITAL | Age: 55
Discharge: HOME | End: 2020-07-15
Attending: EMERGENCY MEDICINE | Admitting: EMERGENCY MEDICINE
Payer: MEDICARE

## 2020-07-15 VITALS
BODY MASS INDEX: 35.78 KG/M2 | DIASTOLIC BLOOD PRESSURE: 94 MMHG | SYSTOLIC BLOOD PRESSURE: 182 MMHG | HEART RATE: 136 BPM | OXYGEN SATURATION: 97 % | WEIGHT: 202 LBS | TEMPERATURE: 97 F | RESPIRATION RATE: 20 BRPM

## 2020-07-15 DIAGNOSIS — R19.7 DIARRHEA: ICD-10-CM

## 2020-07-15 DIAGNOSIS — F41.9 ANXIETY: Primary | ICD-10-CM

## 2020-07-15 PROCEDURE — 99284 EMERGENCY DEPT VISIT MOD MDM: CPT | Performed by: EMERGENCY MEDICINE

## 2020-07-15 PROCEDURE — 99283 EMERGENCY DEPT VISIT LOW MDM: CPT

## 2020-07-15 RX ORDER — BACITRACIN 500 [USP'U]/G
OINTMENT TOPICAL AS NEEDED
Qty: 56.7 G | Refills: 0 | Status: SHIPPED | OUTPATIENT
Start: 2020-07-15 | End: 2020-08-03 | Stop reason: HOSPADM

## 2020-07-15 RX ORDER — LORAZEPAM 0.5 MG/1
1 TABLET ORAL ONCE
Status: COMPLETED | OUTPATIENT
Start: 2020-07-15 | End: 2020-07-15

## 2020-07-15 RX ADMIN — LORAZEPAM 1 MG: 0.5 TABLET ORAL at 02:46

## 2020-07-15 RX ADMIN — LORAZEPAM 1 MG: 0.5 TABLET ORAL at 02:16

## 2020-07-15 NOTE — ED PROVIDER NOTES
History  Chief Complaint   Patient presents with    Anxiety     47 y/o W female c/o worsening anxiety this morning - increasing over past 24 hours  Patient denies any SI/HI; denies alcohol and/or illicit drugs  Compliant with her meds; patient found hyperventilating upon walking into room for exam             Prior to Admission Medications   Prescriptions Last Dose Informant Patient Reported? Taking?    amLODIPine (NORVASC) 5 mg tablet 7/14/2020 at Unknown time  No Yes   Sig: TAKE 1 TABLET BY MOUTH DAILY   busPIRone (BUSPAR) 10 mg tablet 7/14/2020 at Unknown time  No Yes   Sig: Take 1 5 tablets (15 mg total) by mouth 2 (two) times a day   cloNIDine (CATAPRES) 0 1 mg tablet 7/14/2020 at Unknown time  No Yes   Sig: Take 1 tablet (0 1 mg total) by mouth 2 (two) times a day   folic acid (FOLVITE) 1 mg tablet 7/14/2020 at Unknown time  No Yes   Sig: Take 1 tablet (1 mg total) by mouth daily   gabapentin (NEURONTIN) 300 mg capsule 7/14/2020 at Unknown time  No Yes   Sig: Take 2 capsules (600 mg total) by mouth 4 (four) times a day   glipiZIDE (GLUCOTROL XL) 5 mg 24 hr tablet 7/14/2020 at Unknown time  No Yes   Sig: Take 1 tablet (5 mg total) by mouth daily   lithium carbonate 150 mg capsule 7/14/2020 at Unknown time  No Yes   Sig: Take 2 capsules in the morning +3 capsules at bedtime   losartan (COZAAR) 100 MG tablet Not Taking at Unknown time  No No   Sig: Take 1 tablet (100 mg total) by mouth daily   Patient not taking: Reported on 7/15/2020   lurasidone (LATUDA) 80 mg tablet Not Taking at Unknown time  No No   Sig: Take 1 tablet (80 mg total) by mouth daily with dinner   Patient not taking: Reported on 7/15/2020   metoprolol tartrate (LOPRESSOR) 25 mg tablet 7/14/2020 at Unknown time  No Yes   Sig: Take 0 5 tablets (12 5 mg total) by mouth every 12 (twelve) hours   naltrexone (REVIA) 50 mg tablet 7/14/2020 at Unknown time  No Yes   Sig: Take 1 tablet (50 mg total) by mouth daily   nicotine (NICODERM CQ) 21 mg/24 hr TD 24 hr patch Not Taking at Unknown time  No No   Sig: Place 1 patch on the skin daily   Patient not taking: Reported on 7/15/2020   nystatin (MYCOSTATIN) powder Not Taking at Unknown time  No No   Sig: Apply topically 2 (two) times a day   Patient not taking: Reported on 7/15/2020   ondansetron (ZOFRAN-ODT) 4 mg disintegrating tablet Not Taking at Unknown time  No No   Sig: Take 1 tablet (4 mg total) by mouth every 8 (eight) hours as needed for nausea or vomiting   Patient not taking: Reported on 7/15/2020   pantoprazole (PROTONIX) 40 mg tablet 7/14/2020 at Unknown time  No Yes   Sig: Take 1 tablet (40 mg total) by mouth daily in the early morning   sucralfate (CARAFATE) 1 g tablet 7/14/2020 at Unknown time  No Yes   Sig: Take 1 tablet (1 g total) by mouth 4 (four) times a day (before meals and at bedtime)   thiamine 100 MG tablet Not Taking at Unknown time  No No   Sig: Take 1 tablet (100 mg total) by mouth daily   Patient not taking: Reported on 7/15/2020      Facility-Administered Medications: None       Past Medical History:   Diagnosis Date    Alcohol abuse     [facility]ism ([email or web address] )     Anxiety     Bipolar disorder ([facility])     Bowel obstruction ([facility])     Depression     Gastritis     Head injury     Heart palpitations     Hyperlipidemia     Hypertension     Hypothyroidism     PTSD (post-traumatic stress disorder)     Seizure ([email or web address] )     Sleep difficulties     [facility]icide attempt [facility])        Past Surgical History:   Procedure Laterality Date    ABDOMINAL SURGERY      APPENDECTOMY      BOWEL RESECTION      CHOLECYSTECTOMY      ESOPHAGOGASTRODUODENOSCOPY N/A 5/18/2018    Procedure: ESOPHAGOGASTRODUODENOSCOPY (EGD) with bx;  Surgeon: Alma Delia Barnes DO;  Location: [facility]; Service: Gastroenterology    HYSTERECTOMY      KNEE SURGERY Bilateral        Family History   Problem Relation Age of Onset    Diabetes Father      I have reviewed and agree with the history as documented      E-Cigarette/Vaping  E-Cigarette Use Never User      E-Cigarette/Vaping Substances     Social History     Tobacco Use    Smoking status: Current Every Day Smoker     Packs/day: 1 00     Years: 25 00     Pack years: 25 00     Types: Cigarettes    Smokeless tobacco: Never Used   Substance Use Topics    Alcohol use: Not Currently     Alcohol/week: 6 0 standard drinks     Types: 6 Cans of beer per week     Frequency: 4 or more times a week     Drinks per session: 5 or 6     Binge frequency: Daily or almost daily     Comment: patient reports today that she does not drink    Drug use: No       Review of Systems   Psychiatric/Behavioral: The patient is nervous/anxious  All other systems reviewed and are negative  Physical Exam  Physical Exam   Constitutional: She is oriented to person, place, and time  She appears well-developed and well-nourished  She appears distressed  HENT:   Head: Normocephalic and atraumatic  Right Ear: External ear normal    Left Ear: External ear normal    Mouth/Throat: Oropharynx is clear and moist    Eyes: Pupils are equal, round, and reactive to light  Conjunctivae and EOM are normal    Neck: Normal range of motion  Neck supple  No thyromegaly present  Cardiovascular: Regular rhythm and normal heart sounds  Pulmonary/Chest: Effort normal and breath sounds normal    Abdominal: Soft  Bowel sounds are normal    Musculoskeletal: Normal range of motion  Neurological: She is alert and oriented to person, place, and time  Skin: Skin is warm and dry  Capillary refill takes less than 2 seconds  Psychiatric: Her behavior is normal  Judgment and thought content normal  Her mood appears anxious  Her speech is rapid and/or pressured  Cognition and memory are normal    Vitals reviewed        Vital Signs  ED Triage Vitals [07/15/20 0205]   Temperature Pulse Respirations Blood Pressure SpO2   (!) 97 °F (36 1 °C) (!) 160 (!) 60 108/64 97 %      Temp Source Heart Rate Source Patient Position - Orthostatic VS BP Location FiO2 (%)   Tympanic Monitor Sitting Left arm --      Pain Score       --           Vitals:    07/15/20 0205 07/15/20 0230   BP: 108/64    Pulse: (!) 160 (!) 143   Patient Position - Orthostatic VS: Sitting          Visual Acuity      ED Medications  Medications   LORazepam (ATIVAN) tablet 1 mg (1 mg Oral Given 7/15/20 0216)   LORazepam (ATIVAN) tablet 1 mg (1 mg Oral Given 7/15/20 0246)       Diagnostic Studies  Results Reviewed     None                 No orders to display              Procedures  Procedures         ED Course       US AUDIT      Most Recent Value   Initial Alcohol Screen: US AUDIT-C    1  How often do you have a drink containing alcohol?  0 Filed at: 07/15/2020 0224   2  How many drinks containing alcohol do you have on a typical day you are drinking? 0 Filed at: 07/15/2020 0224   3b  FEMALE Any Age, or MALE 65+: How often do you have 4 or more drinks on one occassion? 0 Filed at: 07/15/2020 0224   Audit-C Score  0 Filed at: 07/15/2020 0224                  MUMTAZ/DAST-10      Most Recent Value   How many times in the past year have you    Used an illegal drug or used a prescription medication for non-medical reasons? Never Filed at: 07/15/2020 0206                                MDM      Disposition  Final diagnoses:   Anxiety   Diarrhea     Time reflects when diagnosis was documented in both MDM as applicable and the Disposition within this note     Time User Action Codes Description Comment    7/15/2020  2:48 AM Lavonne Paddy Add [F41 9] Anxiety     7/15/2020  2:48 AM Lavonne Paddy Add [R19 7] Diarrhea       ED Disposition     ED Disposition Condition Date/Time Comment    Discharge Stable Wed Jul 15, 2020  2:48 AM Migue Rasmussen discharge to home/self care              Follow-up Information     Follow up With Specialties Details Why [address], [place] Nurse Practitioner Schedule an appointment as soon as possible for a visit in 1 week As needed Leticia Cardona [address] [phone]  [phone]            Patient's Medications   Discharge Prescriptions    ZINC OXIDE 20 % OINTMENT    Apply topically as needed for irritation or dry skin       Start Date: 7/15/2020 End Date: --       Order Dose: --       Quantity: 56 7 g    Refills: 0     No discharge procedures on file      PDMP Review     None          ED Provider  Electronically Signed by           Noel Herrera DO  07/15/20 6639

## 2020-07-15 NOTE — ED NOTES
Pt  Given portable phone & calling her son for a ride home     Suzanne Thompson, [address] [facility]  07/15/20 2844

## 2020-07-27 ENCOUNTER — HOSPITAL ENCOUNTER (INPATIENT)
Facility: HOSPITAL | Age: 55
LOS: 7 days | Discharge: HOME | DRG: 885 | End: 2020-08-03
Attending: PSYCHIATRY & NEUROLOGY | Admitting: PSYCHIATRY & NEUROLOGY
Payer: MEDICARE

## 2020-07-27 ENCOUNTER — HOSPITAL ENCOUNTER (EMERGENCY)
Facility: HOSPITAL | Age: 55
End: 2020-07-27
Attending: EMERGENCY MEDICINE | Admitting: EMERGENCY MEDICINE
Payer: MEDICARE

## 2020-07-27 VITALS
OXYGEN SATURATION: 95 % | HEART RATE: 101 BPM | RESPIRATION RATE: 18 BRPM | DIASTOLIC BLOOD PRESSURE: 114 MMHG | SYSTOLIC BLOOD PRESSURE: 159 MMHG | TEMPERATURE: 98 F

## 2020-07-27 DIAGNOSIS — F43.12 POST-TRAUMATIC STRESS DISORDER, CHRONIC: Chronic | ICD-10-CM

## 2020-07-27 DIAGNOSIS — I10 ESSENTIAL HYPERTENSION: ICD-10-CM

## 2020-07-27 DIAGNOSIS — Z72.0 TOBACCO ABUSE: ICD-10-CM

## 2020-07-27 DIAGNOSIS — F10.10 ALCOHOL ABUSE: ICD-10-CM

## 2020-07-27 DIAGNOSIS — E03.9 ACQUIRED HYPOTHYROIDISM: ICD-10-CM

## 2020-07-27 DIAGNOSIS — R45.851 SUICIDAL IDEATIONS: ICD-10-CM

## 2020-07-27 DIAGNOSIS — R73.9 HYPERGLYCEMIA: ICD-10-CM

## 2020-07-27 DIAGNOSIS — F41.1 GENERALIZED ANXIETY DISORDER: Chronic | ICD-10-CM

## 2020-07-27 DIAGNOSIS — F41.0 PANIC ATTACK: ICD-10-CM

## 2020-07-27 DIAGNOSIS — R56.9 SEIZURE (HCC): ICD-10-CM

## 2020-07-27 DIAGNOSIS — F31.4 BIPOLAR DISORDER, CURRENT EPISODE DEPRESSED, SEVERE, WITHOUT PSYCHOTIC FEATURES (HCC): ICD-10-CM

## 2020-07-27 DIAGNOSIS — G47.00 INSOMNIA: Primary | ICD-10-CM

## 2020-07-27 DIAGNOSIS — E78.00 HYPERCHOLESTEREMIA: ICD-10-CM

## 2020-07-27 DIAGNOSIS — E11.9 TYPE 2 DIABETES MELLITUS WITHOUT COMPLICATION, WITHOUT LONG-TERM CURRENT USE OF INSULIN (HCC): ICD-10-CM

## 2020-07-27 DIAGNOSIS — F41.0 PANIC ATTACK: Primary | ICD-10-CM

## 2020-07-27 LAB
AMPHETAMINES SERPL QL SCN: NEGATIVE
BARBITURATES UR QL: NEGATIVE
BENZODIAZ UR QL: POSITIVE
COCAINE UR QL: NEGATIVE
ETHANOL EXG-MCNC: 0.01 MG/DL
GLUCOSE SERPL-MCNC: 134 MG/DL (ref 65–140)
METHADONE UR QL: NEGATIVE
OPIATES UR QL SCN: NEGATIVE
OXYCODONE+OXYMORPHONE UR QL SCN: NEGATIVE
PCP UR QL: NEGATIVE
SARS-COV-2 RNA RESP QL NAA+PROBE: NEGATIVE
THC UR QL: NEGATIVE

## 2020-07-27 PROCEDURE — 87635 SARS-COV-2 COVID-19 AMP PRB: CPT | Performed by: EMERGENCY MEDICINE

## 2020-07-27 PROCEDURE — 82075 ASSAY OF BREATH ETHANOL: CPT | Performed by: EMERGENCY MEDICINE

## 2020-07-27 PROCEDURE — 93005 ELECTROCARDIOGRAM TRACING: CPT

## 2020-07-27 PROCEDURE — 99285 EMERGENCY DEPT VISIT HI MDM: CPT

## 2020-07-27 PROCEDURE — 80307 DRUG TEST PRSMV CHEM ANLYZR: CPT | Performed by: EMERGENCY MEDICINE

## 2020-07-27 PROCEDURE — 82948 REAGENT STRIP/BLOOD GLUCOSE: CPT

## 2020-07-27 PROCEDURE — 99284 EMERGENCY DEPT VISIT MOD MDM: CPT | Performed by: EMERGENCY MEDICINE

## 2020-07-27 RX ORDER — BUSPIRONE HYDROCHLORIDE 15 MG/1
15 TABLET ORAL 2 TIMES DAILY
Status: DISCONTINUED | OUTPATIENT
Start: 2020-07-27 | End: 2020-07-27

## 2020-07-27 RX ORDER — NALTREXONE HYDROCHLORIDE 50 MG/1
50 TABLET, FILM COATED ORAL DAILY
Status: CANCELLED | OUTPATIENT
Start: 2020-07-28

## 2020-07-27 RX ORDER — ACETAMINOPHEN 325 MG/1
975 TABLET ORAL EVERY 6 HOURS PRN
Status: DISCONTINUED | OUTPATIENT
Start: 2020-07-27 | End: 2020-08-03 | Stop reason: HOSPADM

## 2020-07-27 RX ORDER — LORAZEPAM 2 MG/ML
1 INJECTION INTRAMUSCULAR EVERY 6 HOURS PRN
Status: DISCONTINUED | OUTPATIENT
Start: 2020-07-27 | End: 2020-08-03 | Stop reason: HOSPADM

## 2020-07-27 RX ORDER — GLIPIZIDE 5 MG/1
5 TABLET ORAL DAILY
Status: DISCONTINUED | OUTPATIENT
Start: 2020-07-28 | End: 2020-08-03 | Stop reason: HOSPADM

## 2020-07-27 RX ORDER — BENZTROPINE MESYLATE 1 MG/ML
1 INJECTION INTRAMUSCULAR; INTRAVENOUS EVERY 6 HOURS PRN
Status: CANCELLED | OUTPATIENT
Start: 2020-07-27

## 2020-07-27 RX ORDER — SUCRALFATE 1 G/1
1 TABLET ORAL
Status: DISCONTINUED | OUTPATIENT
Start: 2020-07-27 | End: 2020-08-03 | Stop reason: HOSPADM

## 2020-07-27 RX ORDER — ACETAMINOPHEN 325 MG/1
650 TABLET ORAL EVERY 6 HOURS PRN
Status: DISCONTINUED | OUTPATIENT
Start: 2020-07-27 | End: 2020-08-03 | Stop reason: HOSPADM

## 2020-07-27 RX ORDER — CLONIDINE HYDROCHLORIDE 0.1 MG/1
0.1 TABLET ORAL EVERY 12 HOURS SCHEDULED
Status: CANCELLED | OUTPATIENT
Start: 2020-07-27

## 2020-07-27 RX ORDER — CLONIDINE HYDROCHLORIDE 0.1 MG/1
0.1 TABLET ORAL EVERY 12 HOURS SCHEDULED
Status: DISCONTINUED | OUTPATIENT
Start: 2020-07-27 | End: 2020-07-27 | Stop reason: HOSPADM

## 2020-07-27 RX ORDER — HYDROXYZINE HYDROCHLORIDE 25 MG/1
25 TABLET, FILM COATED ORAL EVERY 6 HOURS PRN
Status: CANCELLED | OUTPATIENT
Start: 2020-07-27

## 2020-07-27 RX ORDER — HALOPERIDOL 5 MG/ML
5 INJECTION INTRAMUSCULAR EVERY 6 HOURS PRN
Status: CANCELLED | OUTPATIENT
Start: 2020-07-27

## 2020-07-27 RX ORDER — ACETAMINOPHEN 325 MG/1
650 TABLET ORAL EVERY 4 HOURS PRN
Status: DISCONTINUED | OUTPATIENT
Start: 2020-07-27 | End: 2020-08-03 | Stop reason: HOSPADM

## 2020-07-27 RX ORDER — HALOPERIDOL 5 MG/ML
5 INJECTION INTRAMUSCULAR EVERY 6 HOURS PRN
Status: DISCONTINUED | OUTPATIENT
Start: 2020-07-27 | End: 2020-08-03 | Stop reason: HOSPADM

## 2020-07-27 RX ORDER — LORAZEPAM 1 MG/1
1 TABLET ORAL EVERY 6 HOURS PRN
Status: DISCONTINUED | OUTPATIENT
Start: 2020-07-27 | End: 2020-07-29

## 2020-07-27 RX ORDER — OLANZAPINE 10 MG/1
5 INJECTION, POWDER, LYOPHILIZED, FOR SOLUTION INTRAMUSCULAR
Status: DISCONTINUED | OUTPATIENT
Start: 2020-07-27 | End: 2020-08-03 | Stop reason: HOSPADM

## 2020-07-27 RX ORDER — LORAZEPAM 2 MG/ML
1 INJECTION INTRAMUSCULAR EVERY 6 HOURS PRN
Status: CANCELLED | OUTPATIENT
Start: 2020-07-27

## 2020-07-27 RX ORDER — NALTREXONE HYDROCHLORIDE 50 MG/1
50 TABLET, FILM COATED ORAL DAILY
Status: DISCONTINUED | OUTPATIENT
Start: 2020-07-27 | End: 2020-07-27 | Stop reason: HOSPADM

## 2020-07-27 RX ORDER — ACETAMINOPHEN 325 MG/1
650 TABLET ORAL EVERY 4 HOURS PRN
Status: CANCELLED | OUTPATIENT
Start: 2020-07-27

## 2020-07-27 RX ORDER — SUCRALFATE 1 G/1
1 TABLET ORAL
Status: CANCELLED | OUTPATIENT
Start: 2020-07-27

## 2020-07-27 RX ORDER — LITHIUM CARBONATE 300 MG/1
300 CAPSULE ORAL EVERY MORNING
Status: DISCONTINUED | OUTPATIENT
Start: 2020-07-28 | End: 2020-08-03 | Stop reason: HOSPADM

## 2020-07-27 RX ORDER — MAGNESIUM HYDROXIDE/ALUMINUM HYDROXICE/SIMETHICONE 120; 1200; 1200 MG/30ML; MG/30ML; MG/30ML
30 SUSPENSION ORAL EVERY 4 HOURS PRN
Status: CANCELLED | OUTPATIENT
Start: 2020-07-27

## 2020-07-27 RX ORDER — ALPRAZOLAM 0.5 MG/1
0.5 TABLET ORAL 3 TIMES DAILY PRN
Status: DISCONTINUED | OUTPATIENT
Start: 2020-07-27 | End: 2020-07-27 | Stop reason: HOSPADM

## 2020-07-27 RX ORDER — RISPERIDONE 1 MG/1
1 TABLET, ORALLY DISINTEGRATING ORAL EVERY 6 HOURS PRN
Status: CANCELLED | OUTPATIENT
Start: 2020-07-27

## 2020-07-27 RX ORDER — ALPRAZOLAM 0.5 MG/1
1 TABLET ORAL ONCE
Status: COMPLETED | OUTPATIENT
Start: 2020-07-27 | End: 2020-07-27

## 2020-07-27 RX ORDER — FOLIC ACID 1 MG/1
1 TABLET ORAL DAILY
Status: DISCONTINUED | OUTPATIENT
Start: 2020-07-28 | End: 2020-08-03 | Stop reason: HOSPADM

## 2020-07-27 RX ORDER — NALTREXONE HYDROCHLORIDE 50 MG/1
50 TABLET, FILM COATED ORAL DAILY
Status: DISCONTINUED | OUTPATIENT
Start: 2020-07-28 | End: 2020-07-31

## 2020-07-27 RX ORDER — ACETAMINOPHEN 325 MG/1
975 TABLET ORAL EVERY 6 HOURS PRN
Status: CANCELLED | OUTPATIENT
Start: 2020-07-27

## 2020-07-27 RX ORDER — MAGNESIUM HYDROXIDE/ALUMINUM HYDROXICE/SIMETHICONE 120; 1200; 1200 MG/30ML; MG/30ML; MG/30ML
30 SUSPENSION ORAL EVERY 4 HOURS PRN
Status: DISCONTINUED | OUTPATIENT
Start: 2020-07-27 | End: 2020-08-03 | Stop reason: HOSPADM

## 2020-07-27 RX ORDER — OLANZAPINE 10 MG/1
5 INJECTION, POWDER, LYOPHILIZED, FOR SOLUTION INTRAMUSCULAR
Status: CANCELLED | OUTPATIENT
Start: 2020-07-27

## 2020-07-27 RX ORDER — FOLIC ACID 1 MG/1
1 TABLET ORAL DAILY
Status: CANCELLED | OUTPATIENT
Start: 2020-07-28

## 2020-07-27 RX ORDER — HYDROXYZINE HYDROCHLORIDE 25 MG/1
25 TABLET, FILM COATED ORAL EVERY 6 HOURS PRN
Status: DISCONTINUED | OUTPATIENT
Start: 2020-07-27 | End: 2020-08-03 | Stop reason: HOSPADM

## 2020-07-27 RX ORDER — OLANZAPINE 5 MG/1
5 TABLET ORAL
Status: CANCELLED | OUTPATIENT
Start: 2020-07-27

## 2020-07-27 RX ORDER — GLIPIZIDE 5 MG/1
5 TABLET ORAL DAILY
Status: DISCONTINUED | OUTPATIENT
Start: 2020-07-27 | End: 2020-07-27 | Stop reason: HOSPADM

## 2020-07-27 RX ORDER — HALOPERIDOL 5 MG
5 TABLET ORAL EVERY 6 HOURS PRN
Status: CANCELLED | OUTPATIENT
Start: 2020-07-27

## 2020-07-27 RX ORDER — BENZTROPINE MESYLATE 1 MG/1
1 TABLET ORAL EVERY 6 HOURS PRN
Status: DISCONTINUED | OUTPATIENT
Start: 2020-07-27 | End: 2020-08-03 | Stop reason: HOSPADM

## 2020-07-27 RX ORDER — PANTOPRAZOLE SODIUM 40 MG/1
40 TABLET, DELAYED RELEASE ORAL DAILY
Status: DISCONTINUED | OUTPATIENT
Start: 2020-07-27 | End: 2020-07-27 | Stop reason: HOSPADM

## 2020-07-27 RX ORDER — AMLODIPINE BESYLATE 5 MG/1
5 TABLET ORAL DAILY
Status: DISCONTINUED | OUTPATIENT
Start: 2020-07-28 | End: 2020-08-03 | Stop reason: HOSPADM

## 2020-07-27 RX ORDER — HALOPERIDOL 5 MG
5 TABLET ORAL EVERY 6 HOURS PRN
Status: DISCONTINUED | OUTPATIENT
Start: 2020-07-27 | End: 2020-08-03 | Stop reason: HOSPADM

## 2020-07-27 RX ORDER — LITHIUM CARBONATE 300 MG/1
300 CAPSULE ORAL EVERY MORNING
Status: DISCONTINUED | OUTPATIENT
Start: 2020-07-27 | End: 2020-07-27 | Stop reason: HOSPADM

## 2020-07-27 RX ORDER — TRAZODONE HYDROCHLORIDE 50 MG/1
50 TABLET ORAL
Status: DISCONTINUED | OUTPATIENT
Start: 2020-07-27 | End: 2020-07-31

## 2020-07-27 RX ORDER — ACETAMINOPHEN 325 MG/1
650 TABLET ORAL EVERY 6 HOURS PRN
Status: DISCONTINUED | OUTPATIENT
Start: 2020-07-27 | End: 2020-07-27 | Stop reason: HOSPADM

## 2020-07-27 RX ORDER — GABAPENTIN 300 MG/1
600 CAPSULE ORAL 4 TIMES DAILY
Status: DISCONTINUED | OUTPATIENT
Start: 2020-07-27 | End: 2020-07-27 | Stop reason: HOSPADM

## 2020-07-27 RX ORDER — BENZTROPINE MESYLATE 1 MG/ML
1 INJECTION INTRAMUSCULAR; INTRAVENOUS EVERY 6 HOURS PRN
Status: DISCONTINUED | OUTPATIENT
Start: 2020-07-27 | End: 2020-08-03 | Stop reason: HOSPADM

## 2020-07-27 RX ORDER — FOLIC ACID 1 MG/1
1 TABLET ORAL DAILY
Status: DISCONTINUED | OUTPATIENT
Start: 2020-07-27 | End: 2020-07-27 | Stop reason: HOSPADM

## 2020-07-27 RX ORDER — ACETAMINOPHEN 325 MG/1
650 TABLET ORAL EVERY 6 HOURS PRN
Status: CANCELLED | OUTPATIENT
Start: 2020-07-27

## 2020-07-27 RX ORDER — AMLODIPINE BESYLATE 5 MG/1
5 TABLET ORAL DAILY
Status: DISCONTINUED | OUTPATIENT
Start: 2020-07-27 | End: 2020-07-27 | Stop reason: HOSPADM

## 2020-07-27 RX ORDER — CLONIDINE HYDROCHLORIDE 0.1 MG/1
0.1 TABLET ORAL EVERY 12 HOURS SCHEDULED
Status: DISCONTINUED | OUTPATIENT
Start: 2020-07-27 | End: 2020-08-03 | Stop reason: HOSPADM

## 2020-07-27 RX ORDER — OLANZAPINE 5 MG/1
5 TABLET ORAL
Status: DISCONTINUED | OUTPATIENT
Start: 2020-07-27 | End: 2020-08-03 | Stop reason: HOSPADM

## 2020-07-27 RX ORDER — HYDROXYZINE 50 MG/1
50 TABLET, FILM COATED ORAL EVERY 4 HOURS PRN
Status: DISCONTINUED | OUTPATIENT
Start: 2020-07-27 | End: 2020-07-30

## 2020-07-27 RX ORDER — SUCRALFATE 1 G/1
1 TABLET ORAL
Status: DISCONTINUED | OUTPATIENT
Start: 2020-07-27 | End: 2020-07-27 | Stop reason: HOSPADM

## 2020-07-27 RX ORDER — AMLODIPINE BESYLATE 5 MG/1
5 TABLET ORAL DAILY
Status: CANCELLED | OUTPATIENT
Start: 2020-07-28

## 2020-07-27 RX ORDER — TRAZODONE HYDROCHLORIDE 50 MG/1
50 TABLET ORAL
Status: CANCELLED | OUTPATIENT
Start: 2020-07-27

## 2020-07-27 RX ORDER — RISPERIDONE 1 MG/1
1 TABLET, ORALLY DISINTEGRATING ORAL EVERY 6 HOURS PRN
Status: DISCONTINUED | OUTPATIENT
Start: 2020-07-27 | End: 2020-08-03 | Stop reason: HOSPADM

## 2020-07-27 RX ORDER — GABAPENTIN 300 MG/1
600 CAPSULE ORAL 4 TIMES DAILY
Status: CANCELLED | OUTPATIENT
Start: 2020-07-27

## 2020-07-27 RX ORDER — LITHIUM CARBONATE 300 MG/1
300 CAPSULE ORAL EVERY MORNING
Status: CANCELLED | OUTPATIENT
Start: 2020-07-28

## 2020-07-27 RX ORDER — BUSPIRONE HYDROCHLORIDE 15 MG/1
15 TABLET ORAL 2 TIMES DAILY
Status: DISCONTINUED | OUTPATIENT
Start: 2020-07-27 | End: 2020-07-28

## 2020-07-27 RX ORDER — BENZTROPINE MESYLATE 1 MG/1
1 TABLET ORAL EVERY 6 HOURS PRN
Status: CANCELLED | OUTPATIENT
Start: 2020-07-27

## 2020-07-27 RX ORDER — PANTOPRAZOLE SODIUM 40 MG/1
40 TABLET, DELAYED RELEASE ORAL DAILY
Status: CANCELLED | OUTPATIENT
Start: 2020-07-28

## 2020-07-27 RX ORDER — HYDROXYZINE HYDROCHLORIDE 25 MG/1
50 TABLET, FILM COATED ORAL EVERY 4 HOURS PRN
Status: CANCELLED | OUTPATIENT
Start: 2020-07-27

## 2020-07-27 RX ORDER — GABAPENTIN 300 MG/1
600 CAPSULE ORAL 4 TIMES DAILY
Status: DISCONTINUED | OUTPATIENT
Start: 2020-07-27 | End: 2020-07-27

## 2020-07-27 RX ORDER — PANTOPRAZOLE SODIUM 40 MG/1
40 TABLET, DELAYED RELEASE ORAL DAILY
Status: DISCONTINUED | OUTPATIENT
Start: 2020-07-28 | End: 2020-08-03 | Stop reason: HOSPADM

## 2020-07-27 RX ORDER — GLIPIZIDE 5 MG/1
5 TABLET ORAL DAILY
Status: CANCELLED | OUTPATIENT
Start: 2020-07-28

## 2020-07-27 RX ORDER — GABAPENTIN 300 MG/1
600 CAPSULE ORAL 4 TIMES DAILY
Status: DISCONTINUED | OUTPATIENT
Start: 2020-07-27 | End: 2020-07-28

## 2020-07-27 RX ADMIN — SUCRALFATE 1 G: 1 TABLET ORAL at 08:08

## 2020-07-27 RX ADMIN — SUCRALFATE 1 G: 1 TABLET ORAL at 21:07

## 2020-07-27 RX ADMIN — PANTOPRAZOLE SODIUM 40 MG: 40 TABLET, DELAYED RELEASE ORAL at 08:09

## 2020-07-27 RX ADMIN — BUSPIRONE HYDROCHLORIDE 15 MG: 15 TABLET ORAL at 21:07

## 2020-07-27 RX ADMIN — GABAPENTIN 600 MG: 300 CAPSULE ORAL at 08:08

## 2020-07-27 RX ADMIN — FOLIC ACID 1 MG: 1 TABLET ORAL at 08:09

## 2020-07-27 RX ADMIN — CLONIDINE HYDROCHLORIDE 0.1 MG: 0.1 TABLET ORAL at 21:07

## 2020-07-27 RX ADMIN — ACETAMINOPHEN 650 MG: 325 TABLET ORAL at 04:54

## 2020-07-27 RX ADMIN — METOPROLOL TARTRATE 12.5 MG: 25 TABLET ORAL at 21:07

## 2020-07-27 RX ADMIN — CLONIDINE HYDROCHLORIDE 0.1 MG: 0.1 TABLET ORAL at 08:08

## 2020-07-27 RX ADMIN — GABAPENTIN 600 MG: 300 CAPSULE ORAL at 12:35

## 2020-07-27 RX ADMIN — ALPRAZOLAM 0.5 MG: 0.5 TABLET ORAL at 04:54

## 2020-07-27 RX ADMIN — TRAZODONE HYDROCHLORIDE 50 MG: 50 TABLET ORAL at 23:47

## 2020-07-27 RX ADMIN — LITHIUM CARBONATE 450 MG: 300 CAPSULE, GELATIN COATED ORAL at 21:06

## 2020-07-27 RX ADMIN — METOPROLOL TARTRATE 12.5 MG: 25 TABLET, FILM COATED ORAL at 08:09

## 2020-07-27 RX ADMIN — SUCRALFATE 1 G: 1 TABLET ORAL at 16:03

## 2020-07-27 RX ADMIN — LITHIUM CARBONATE 300 MG: 300 CAPSULE, GELATIN COATED ORAL at 08:55

## 2020-07-27 RX ADMIN — ALPRAZOLAM 0.5 MG: 0.5 TABLET ORAL at 08:08

## 2020-07-27 RX ADMIN — NALTREXONE HYDROCHLORIDE 50 MG: 50 TABLET, FILM COATED ORAL at 08:55

## 2020-07-27 RX ADMIN — SUCRALFATE 1 G: 1 TABLET ORAL at 12:35

## 2020-07-27 RX ADMIN — GABAPENTIN 600 MG: 300 CAPSULE ORAL at 21:06

## 2020-07-27 RX ADMIN — BUSPIRONE HYDROCHLORIDE 15 MG: 10 TABLET ORAL at 08:55

## 2020-07-27 RX ADMIN — ALPRAZOLAM 0.5 MG: 0.5 TABLET ORAL at 15:30

## 2020-07-27 RX ADMIN — AMLODIPINE BESYLATE 5 MG: 5 TABLET ORAL at 08:09

## 2020-07-27 RX ADMIN — ALPRAZOLAM 1 MG: 0.5 TABLET ORAL at 02:30

## 2020-07-27 RX ADMIN — GLIPIZIDE 5 MG: 5 TABLET ORAL at 08:55

## 2020-07-27 RX ADMIN — ACETAMINOPHEN 650 MG: 325 TABLET ORAL at 08:08

## 2020-07-27 NOTE — ED NOTES
Patient complaining of headache and continued anxiety  Provider notified       [address], RN  07/27/20 5509

## 2020-07-27 NOTE — PROGRESS NOTES
48year old  female adm to [place] [address] increased anxiety and depression, stated she is depressed over her sons diagnosis of [place] history of etoh abuse, stated she drinks a 6 pk on weekends only  history is some what diffrent than what pt addmitts to, is pleasant and cooperative at this time, denies voice, SI, and hallucinations, will ontinue to monitor for ssw

## 2020-07-27 NOTE — EMTALA/ACUTE CARE TRANSFER
[facility]  [address] [facility] [address]  Dept: [phone]      EMTALA TRANSFER CONSENT    NAME Cecy Tony                                         1965                              MRN [id number]    I have been informed of my rights regarding examination, treatment, and transfer   by Dr Manuela Butt DO    Benefits: Specialized equipment and/or services available at the receiving facility (Include comment)________________________    Risks: Potential for delay in receiving treatment      Consent for Transfer:  I acknowledge that my medical condition has been evaluated and explained to me by the emergency department physician or other qualified medical person and/or my attending physician, who has recommended that I be transferred to the service of  Accepting Physician: Waylon Naranjo at [address] Name, [address] : [address]  The above potential benefits of such transfer, the potential risks associated with such transfer, and the probable risks of not being transferred have been explained to me, and I fully understand them  The doctor has explained that, in my case, the benefits of transfer outweigh the risks  I agree to be transferred  I authorize the performance of emergency medical procedures and treatments upon me in both transit and upon arrival at the receiving facility  Additionally, I authorize the release of any and all medical records to the receiving facility and request they be transported with me, if possible  I understand that the safest mode of transportation during a medical emergency is an ambulance and that the Hospital advocates the use of this mode of transport  Risks of traveling to the receiving facility by car, including absence of medical control, life sustaining equipment, such as oxygen, and medical personnel has been explained to me and I fully understand them      (TIM CORRECT BOX BELOW)  [ x ]  I consent to the stated transfer and to be transported by ambulance/helicopter  [  ]  I consent to the stated transfer, but refuse transportation by ambulance and accept full responsibility for my transportation by car  I understand the risks of non-ambulance transfers and I exonerate the Hospital and its staff from any deterioration in my condition that results from this refusal     X___________________________________________    DATE  20  TIME________  Signature of patient or legally responsible individual signing on patient behalf           RELATIONSHIP TO PATIENT_________________________          Provider Certification    NAME Jasmin Fernandez                                         1965                              MRN [id number]    A medical screening exam was performed on the above named patient  Based on the examination:    Condition Necessitating Transfer The primary encounter diagnosis was Panic attack  Diagnoses of Suicidal ideations, Essential hypertension, Acquired hypothyroidism, h/o Seizure ([address] ), Post-traumatic stress disorder, chronic, Hypercholesteremia, Tobacco abuse, Generalized anxiety disorder, Alcohol abuse, Bipolar disorder, current episode depressed, severe, without psychotic features ([address] ), Hyperglycemia, and Type 2 diabetes mellitus without complication, without long-term current use of insulin ([facility] [address] ) were also pertinent to this visit      Patient Condition: The patient has been stabilized such that within reasonable medical probability, no material deterioration of the patient condition or the condition of the unborn child(sage) is likely to result from the transfer    Reason for Transfer: Level of Care needed not available at this facility    Transfer Requirements: [address]   · Space available and qualified personnel available for treatment as acknowledged by Billy Safety Hound [phone]  · Agreed to accept transfer and to provide appropriate medical treatment as acknowledged by       Josefa Sheffield Valery Brown  · Appropriate medical records of the examination and treatment of the patient are provided at the time of transfer   [address] [place] [address] [address] _______  · Transfer will be performed by qualified personnel from slets  and appropriate transfer equipment as required, including the use of necessary and appropriate life support measures  Provider Certification: I have examined the patient and explained the following risks and benefits of being transferred/refusing transfer to the patient/family:  General risk, such as traffic hazards, adverse weather conditions, rough terrain or turbulence, possible failure of equipment (including vehicle or aircraft), or consequences of actions of persons outside the control of the transport personnel      Based on these reasonable risks and benefits to the patient and/or the unborn child(sage), and based upon the information available at the time of the patients examination, I certify that the medical benefits reasonably to be expected from the provision of appropriate medical treatments at another medical facility outweigh the increasing risks, if any, to the individuals medical condition, and in the case of labor to the unborn child, from effecting the transfer      X____________________________________________ DATE 07/27/20        TIME_______      ORIGINAL - SEND TO MEDICAL RECORDS   COPY - SEND WITH PATIENT DURING TRANSFER

## 2020-07-27 NOTE — ED NOTES
Resumed care of patient  Patient in room , sleeping quietly  Remains on 1:1 supervision        Ginette Smith RN  07/27/20 1955

## 2020-07-27 NOTE — ED NOTES
Patient reports she has been very depressed anxious and suicidal after finding out that her son who is 28 has cancer  She has increased anxiety and has not been able to cope  She reports she has been drinking 6 beers 2x a week and has been unable to sleep  She does have follow up at [facility] [place] but does not know the name of her psychiatrist   Her therapist is Jaz  Patient does not feel safe being home and is willing to sign a 201 for inpatient treatment

## 2020-07-27 NOTE — ED NOTES
Patient requesting to use her cell phone to make a phone call to schedule an appointment for her son with is cancer doctor  RN agrees to allow patient to utilize her phone for this matter, when complete return the phone  Patient agrees        Carine Alexandre RN  07/27/20 8277

## 2020-07-27 NOTE — ED NOTES
Patient is asleep at this time  Awakens easily  Vital signs improving  Patient reports feeling somewhat improved with anxiety        Yolis Metzger RN  07/27/20 4911

## 2020-07-27 NOTE — ED CARE HANDOFF
Emergency Department Sign Out Note        Sign out and transfer of care from [facility]  See Separate Emergency Department note  The patient, Bryson Trinh, was evaluated by the previous provider for anxiety  Workup Completed:  Covid  UDS  Breath alcohol    ED Course / Workup Pending (followup):      Upon discharge by the previous provider, patient stated she was having SI and wanted to sign herself in  Patient awaiting to be seen by Crisis and disposition decided  201 signed  Patient awaiting placement  Patient signed out to Dr Martha Pascual awaiting placement  Patient stable  Procedures  MDM  Number of Diagnoses or Management Options  Panic attack: new and requires workup  Suicidal ideations: new and requires workup     Amount and/or Complexity of Data Reviewed  Clinical lab tests: ordered and reviewed  Discuss the patient with other providers: yes    Patient Progress  Patient progress: stable      Disposition  Final diagnoses:   Panic attack   Suicidal ideations     Time reflects when diagnosis was documented in both MDM as applicable and the Disposition within this note     Time User Action Codes Description Comment    7/27/2020  6:25 AM Betty BALBUENA Add [F41 0] Panic attack     7/27/2020  6:25 AM Brenda Meyer Add [X08 293] Suicidal ideations       ED Disposition     None      Follow-up Information    None       Patient's Medications   Discharge Prescriptions    No medications on file     No discharge procedures on file         ED Provider  Electronically Signed by     Sharan Olson PA-C  07/27/20 5134

## 2020-07-27 NOTE — ED NOTES
Patient was offered to order breakfast multiple times but refused     Landy Machado RN  07/27/20 4473

## 2020-07-27 NOTE — EMTALA/ACUTE CARE TRANSFER
[facility] [address]  [address] [facility] [address]  Dept: [phone]      EMTALA TRANSFER CONSENT    NAME Hi Wade                                         1965                              MRN [id number]    I have been informed of my rights regarding examination, treatment, and transfer   by Dr Meribeth Gottron, DO    Benefits: Specialized equipment and/or services available at the receiving facility (Include comment)________________________    Risks: Potential for delay in receiving treatment      Consent for Transfer:  I acknowledge that my medical condition has been evaluated and explained to me by the emergency department physician or other qualified medical person and/or my attending physician, who has recommended that I be transferred to the service of  Accepting Physician: Arslan Cm at [address] Name, [address] : [address]  The above potential benefits of such transfer, the potential risks associated with such transfer, and the probable risks of not being transferred have been explained to me, and I fully understand them  The doctor has explained that, in my case, the benefits of transfer outweigh the risks  I agree to be transferred  I authorize the performance of emergency medical procedures and treatments upon me in both transit and upon arrival at the receiving facility  Additionally, I authorize the release of any and all medical records to the receiving facility and request they be transported with me, if possible  I understand that the safest mode of transportation during a medical emergency is an ambulance and that the Hospital advocates the use of this mode of transport  Risks of traveling to the receiving facility by car, including absence of medical control, life sustaining equipment, such as oxygen, and medical personnel has been explained to me and I fully understand them      (TIM CORRECT BOX BELOW)  [  ]  I consent to the stated transfer and to be transported by ambulance/helicopter  [  ]  I consent to the stated transfer, but refuse transportation by ambulance and accept full responsibility for my transportation by car  I understand the risks of non-ambulance transfers and I exonerate the Hospital and its staff from any deterioration in my condition that results from this refusal     X___________________________________________    DATE  20  TIME________  Signature of patient or legally responsible individual signing on patient behalf           RELATIONSHIP TO PATIENT_________________________          Provider Certification    NAME Jack CONTE 1965                              MRN [id number]    A medical screening exam was performed on the above named patient  Based on the examination:    Condition Necessitating Transfer The primary encounter diagnosis was Panic attack  Diagnoses of Suicidal ideations, Essential hypertension, Acquired hypothyroidism, h/o Seizure ([address] ), Post-traumatic stress disorder, chronic, Hypercholesteremia, Tobacco abuse, Generalized anxiety disorder, Alcohol abuse, Bipolar disorder, current episode depressed, severe, without psychotic features ([address] ), Hyperglycemia, and Type 2 diabetes mellitus without complication, without long-term current use of insulin ([address] ) were also pertinent to this visit      Patient Condition: The patient has been stabilized such that within reasonable medical probability, no material deterioration of the patient condition or the condition of the unborn child(sage) is likely to result from the transfer    Reason for Transfer: Level of Care needed not available at this facility    Transfer Requirements: [address]   · Space available and qualified personnel available for treatment as acknowledged by Billy Pica8 [phone]  · Agreed to accept transfer and to provide appropriate medical treatment as acknowledged by       Federico Brown Rain Russo  · Appropriate medical records of the examination and treatment of the patient are provided at the time of transfer   [address] [place] [address] [address] _______  · Transfer will be performed by qualified personnel from slets  and appropriate transfer equipment as required, including the use of necessary and appropriate life support measures  Provider Certification: I have examined the patient and explained the following risks and benefits of being transferred/refusing transfer to the patient/family:  General risk, such as traffic hazards, adverse weather conditions, rough terrain or turbulence, possible failure of equipment (including vehicle or aircraft), or consequences of actions of persons outside the control of the transport personnel      Based on these reasonable risks and benefits to the patient and/or the unborn child(sage), and based upon the information available at the time of the patients examination, I certify that the medical benefits reasonably to be expected from the provision of appropriate medical treatments at another medical facility outweigh the increasing risks, if any, to the individuals medical condition, and in the case of labor to the unborn child, from effecting the transfer      X____________________________________________ DATE 07/27/20        TIME_______      ORIGINAL - SEND TO MEDICAL RECORDS   COPY - SEND WITH PATIENT DURING TRANSFER

## 2020-07-27 NOTE — ED NOTES
Patient offered food at this time but states she is not hungry       Letty Oconnell RN  07/27/20 5071

## 2020-07-27 NOTE — ED NOTES
RN assumes care of the patient at this time  Patient is lying bed sleeping  1:1 behavioral health observations are being continued via paper documentation by Trish Garcia RN  07/27/20 1100

## 2020-07-27 NOTE — LETTER
Section I - General Information    Name of Patient: Justino Kelley                 : 1965    Medicare #:____________________  Transport Date: 20 (PCS is valid for round trips on this date and for all repetitive trips in the 60-day range as noted below )  Origin: [place] [address]                                                         Destination:________________________________________________  Is the pt's stay covered under Medicare Part A (PPS/DRG)     (_) YES  (_) NO  Closest appropriate facility?  (_) YES  (_) NO  If no, why is transport to more distant facility required?________________________  If hosp-hosp transfer, describe services needed at 2nd facility not available at 1st facility? _________________________________  If hospice pt, is this transport related to pt's terminal illness? (_) YES (_) NO Describe____________________________________    Section II - Medical Necessity Questionnaire  Ambulance transportation is medically necessary only if other means of transport are contraindicated or would be potentially harmful to the patient  To meet this requirement, the patient must either be "bed confined" or suffer from a condition such that transport by means other than ambulance is contraindicated by the patient's condition   The following questions must be answered by the medical professional signing below for this form to be valid:    1)  Describe the MEDICAL CONDITION (physical and/or mental) of this patient AT [address] [place] [address] that requires the patient to be transported in an ambulance and why transport by other means is contraindicated by the patient's condition:__________________________________________________________________________________________________    2) Is the patient "bed confined" as defined below?     (_) YES  (_) NO  To be "be confined" the patient must satisfy all three of the following conditions: (1) unable to get up from bed without Assistance; AND (2) unable to ambulate; AND (3) unable to sit in a chair or wheelchair  3) Can this patient safely be transported by car or wheelchair Fercho Hernandez (i e , seated during transport without a medical attendant or monitoring)?   (_) YES  (_) NO    4) In addition to completing questions 1-3 above, please check any of the following conditions that apply*:  *Note: supporting documentation for any boxes checked must be maintained in the patient's medical records  (_)Contractures   (_)Non-Healed Fractures  (_)Patient is confused (_)Patient is comatose (_)Moderate/severe pain on movement (_)Danger to self/others  (_)IV meds/fluids required (_)Patient is combative(_)Need or possible need for restraints (_)DVT requires elevation of lower extremity  (_)Medical attendant required (_)Requires oxygen-unable to self administer (_)Special handling/isolation/infection control precautions required (_)Unable to tolerate seated position for time needed to transport (_)Hemodynamic monitoring required en route (_)Unable to sit in a chair or wheelchair due to decubitus ulcers or other wounds (_)Cardiac monitoring required en route (_)Morbid obesity requires additional personnel/equipment to safely handle patient (_)Orthopedic device (backboard, halo, pins, traction, brace, wedge, etc,) requiring special handling during transport (_)Other(specify)_______________________________________________    Section III - Signature of Physician or Healthcare Professional  I certify that the above information is true and correct based on my evaluation of this patient, and represent that the patient requires transport by ambulance and that other forms of transport are contraindicated   I understand that this information will be used by the Centers for Medicare and Medicaid Services (CMS) to support the determination of medical necessity for ambulance services, and I represent that I have personal knowledge of the patient's condition at time of transport  (_) If this box is checked, I also certify that the patient is physically or mentally incapable of signing the ambulance service's claim and that the institution with which I am affiliated has furnished care, services, or assistance to the patient  My signature below is made on behalf of the patient pursuant to 42 CFR §424 36(b)(4)  In accordance with 42 CFR §424 37, the specific reason(s) that the patient is physically or mentally incapable of signing the claim form is as follows: _________________________________________________________________________________________________________      Signature of Physician* or Healthcare Professional______________________________________________________________  Signature Date 07/27/20 (For scheduled repetitive transports, this form is not valid for transports performed more than 60 days after this date)    Printed Name & Credentials of Physician or Healthcare Professional (MD, DO, RN, etc )________________________________  *Form must be signed by patient's attending physician for scheduled, repetitive transports   For non-repetitive, unscheduled ambulance transports, if unable to obtain the signature of the attending physician, any of the following may sign (choose appropriate option below)  (_) Physician Assistant (_)  Clinical Nurse Specialist (_)  Registered Nurse  (_)  Nurse Practitioner  (_) Discharge Planner

## 2020-07-27 NOTE — ED PROVIDER NOTES
History  Chief Complaint   Patient presents with    Anxiety     Patient presents via EMS with anxiety  Progressively worsening since she found out her son has cancer  Denies SI/HI/hallucinations   Panic Attack     Pt is a 48year old female with a PMH of Bipolar I, depression, anxiety, PTSD presenting with anxiety  Pt states she learned that her son has cancer and she is now having a panic attack  Pt is labile and crying, shaking and pacing the room  Denies SI, HI, AH or VH  Denies taking medications PTA  Prior to Admission Medications   Prescriptions Last Dose Informant Patient Reported? Taking?    amLODIPine (NORVASC) 5 mg tablet   No No   Sig: TAKE 1 TABLET BY MOUTH DAILY   busPIRone (BUSPAR) 10 mg tablet   No No   Sig: Take 1 5 tablets (15 mg total) by mouth 2 (two) times a day   cloNIDine (CATAPRES) 0 1 mg tablet   No No   Sig: Take 1 tablet (0 1 mg total) by mouth 2 (two) times a day   folic acid (FOLVITE) 1 mg tablet   No No   Sig: Take 1 tablet (1 mg total) by mouth daily   gabapentin (NEURONTIN) 300 mg capsule   No No   Sig: Take 2 capsules (600 mg total) by mouth 4 (four) times a day   glipiZIDE (GLUCOTROL XL) 5 mg 24 hr tablet   No No   Sig: Take 1 tablet (5 mg total) by mouth daily   lithium carbonate 150 mg capsule   No No   Sig: Take 2 capsules in the morning +3 capsules at bedtime   losartan (COZAAR) 100 MG tablet   No No   Sig: Take 1 tablet (100 mg total) by mouth daily   Patient not taking: Reported on 7/15/2020   lurasidone (LATUDA) 80 mg tablet   No No   Sig: Take 1 tablet (80 mg total) by mouth daily with dinner   Patient not taking: Reported on 7/15/2020   metoprolol tartrate (LOPRESSOR) 25 mg tablet   No No   Sig: Take 0 5 tablets (12 5 mg total) by mouth every 12 (twelve) hours   naltrexone (REVIA) 50 mg tablet   No No   Sig: Take 1 tablet (50 mg total) by mouth daily   nicotine (NICODERM CQ) 21 mg/24 hr TD 24 hr patch   No No   Sig: Place 1 patch on the skin daily   Patient not taking: Reported on 7/15/2020   nystatin (MYCOSTATIN) powder   No No   Sig: Apply topically 2 (two) times a day   Patient not taking: Reported on 7/15/2020   ondansetron (ZOFRAN-ODT) 4 mg disintegrating tablet   No No   Sig: Take 1 tablet (4 mg total) by mouth every 8 (eight) hours as needed for nausea or vomiting   Patient not taking: Reported on 7/15/2020   pantoprazole (PROTONIX) 40 mg tablet   No No   Sig: Take 1 tablet (40 mg total) by mouth daily in the early morning   sucralfate (CARAFATE) 1 g tablet   No No   Sig: Take 1 tablet (1 g total) by mouth 4 (four) times a day (before meals and at bedtime)   thiamine 100 MG tablet   No No   Sig: Take 1 tablet (100 mg total) by mouth daily   Patient not taking: Reported on 7/15/2020   zinc oxide 20 % ointment   No No   Sig: Apply topically as needed for irritation or dry skin      Facility-Administered Medications: None       Past Medical History:   Diagnosis Date    Alcohol abuse     Alcoholism ([email or web address]     Anxiety     Bipolar disorder (HCC)     Bowel obstruction (HCC)     Depression     Gastritis     Head injury     Heart palpitations     Hyperlipidemia     Hypertension     Hypothyroidism     PTSD (post-traumatic stress disorder)     Seizure ([email or web address] )     Sleep difficulties     Suicide attempt [facility])        Past Surgical History:   Procedure Laterality Date    ABDOMINAL SURGERY      APPENDECTOMY      BOWEL RESECTION      CHOLECYSTECTOMY      ESOPHAGOGASTRODUODENOSCOPY N/A 5/18/2018    Procedure: ESOPHAGOGASTRODUODENOSCOPY (EGD) with bx;  Surgeon: Niranjan Nuñez DO;  Location: [facility]; Service: Gastroenterology    HYSTERECTOMY      KNEE SURGERY Bilateral        Family History   Problem Relation Age of Onset    Diabetes Father      I have reviewed and agree with the history as documented      E-Cigarette/Vaping    E-Cigarette Use Never User      E-Cigarette/Vaping Substances     Social History     Tobacco Use    Smoking status: Current Every Day Smoker     Packs/day: 1 00     Years: 25 00     Pack years: 25 00     Types: Cigarettes    Smokeless tobacco: Never Used   Substance Use Topics    Alcohol use: Yes     Alcohol/week: 6 0 standard drinks     Types: 6 Cans of beer per week     Frequency: 4 or more times a week     Drinks per session: 5 or 6     Binge frequency: Daily or almost daily     Comment: patient reports today that she does not drink    Drug use: No       Review of Systems   Constitutional: Negative  HENT: Negative  Respiratory: Negative  Cardiovascular: Negative  Genitourinary: Negative  Musculoskeletal: Negative  Psychiatric/Behavioral: Negative for agitation, behavioral problems, confusion, decreased concentration, dysphoric mood, hallucinations, self-injury, sleep disturbance and suicidal ideas  The patient is nervous/anxious and is hyperactive  All other systems reviewed and are negative  Physical Exam  Physical Exam   Constitutional: She is oriented to person, place, and time  She appears well-developed and well-nourished  No distress  HENT:   Head: Normocephalic and atraumatic  Right Ear: External ear normal    Left Ear: External ear normal    Nose: Nose normal    Eyes: Conjunctivae and EOM are normal    Neck: Normal range of motion  Neck supple  Cardiovascular: Normal rate, regular rhythm, normal heart sounds and intact distal pulses  Pulmonary/Chest: Effort normal and breath sounds normal    Musculoskeletal: Normal range of motion  Neurological: She is alert and oriented to person, place, and time  Skin: Skin is warm and dry  She is not diaphoretic  Psychiatric: Her behavior is normal  Thought content normal  Her mood appears anxious  Her affect is labile  Her speech is rapid and/or pressured  She is not actively hallucinating  She is attentive         Vital Signs  ED Triage Vitals   Temperature Pulse Respirations Blood Pressure SpO2   07/27/20 0151 07/27/20 0152 07/27/20 0152 07/27/20 0152 07/27/20 0152   99 3 °F (37 4 °C) (!) 129 (!) 32 (!) 178/103 96 %      Temp src Heart Rate Source Patient Position - Orthostatic VS BP Location FiO2 (%)   -- 07/27/20 0152 07/27/20 0308 07/27/20 0308 --    Monitor Lying Left arm       Pain Score       07/27/20 0152       No Pain           Vitals:    07/27/20 0152 07/27/20 0308 07/27/20 0424   BP: (!) 178/103 (!) 175/79 166/93   Pulse: (!) 129 105 97   Patient Position - Orthostatic VS:  Lying Lying         Visual Acuity      ED Medications  Medications   ALPRAZolam (XANAX) tablet 0 5 mg (0 5 mg Oral Given 7/27/20 0454)   acetaminophen (TYLENOL) tablet 650 mg (650 mg Oral Given 7/27/20 0454)   ALPRAZolam (XANAX) tablet 1 mg (1 mg Oral Given 7/27/20 0230)       Diagnostic Studies  Results Reviewed     Procedure Component Value Units Date/Time    [facility] [[id number]]  (Normal) Collected:  07/27/20 0413    Lab Status:  Final result Specimen:  Nares from Nose Updated:  07/27/20 0535     SARS-CoV-2 Negative    Narrative: The specimen collection materials, transport medium, and/or testing methodology utilized in the production of these test results have been proven to be reliable in a limited validation with an abbreviated program under the Emergency Utilization Authorization provided by the FDA  Testing reported as "Presumptive positive" will be confirmed with secondary testing with a reference laboratory to ensure result accuracy  Clinical caution and judgement should be used with the interpretation of these results with consideration of the clinical impression and other laboratory testing  Testing reported as "Positive" or "Negative" has been proven to be accurate according to standard laboratory validation requirements  All testing is performed with control materials showing appropriate reactivity at standard intervals        Rapid drug screen, urine [[id number]]  (Abnormal) Collected:  07/27/20 0413    Lab Status:  Final result Specimen:  Urine, Clean Catch Updated:  07/27/20 0453     Amph/Meth UR Negative     Barbiturate Ur Negative     Benzodiazepine Urine Positive     Cocaine Urine Negative     Methadone Urine Negative     Opiate Urine Negative     PCP Ur Negative     THC Urine Negative     Oxycodone Urine Negative    Narrative:       Presumptive report  If requested, specimen will be sent to reference lab for confirmation  FOR MEDICAL PURPOSES ONLY  IF CONFIRMATION NEEDED PLEASE CONTACT THE LAB WITHIN 5 DAYS  Drug Screen Cutoff Levels:  AMPHETAMINE/METHAMPHETAMINES  1000 ng/mL  BARBITURATES     200 ng/mL  BENZODIAZEPINES     200 ng/mL  COCAINE      300 ng/mL  METHADONE      300 ng/mL  OPIATES      300 ng/mL  PHENCYCLIDINE     25 ng/mL  THC       50 ng/mL  OXYCODONE      100 ng/mL    POCT alcohol breath test [[id number]]  (Normal) Resulted:  07/27/20 0408    Lab Status:  Final result Updated:  07/27/20 0408     EXTBreath Alcohol 0 009                 No orders to display              Procedures  Procedures         ED Course  ED Course as of Jul 27 0625   Mon Jul 27, 2020   0224 Pt is extremely anxious after hearing her son has cancer  Will give her a Xanax and reassess  SIRS not due to sepsis  0 Pt is now calm but is saying she is SI and wants to sign 201  Will consult crisis  US AUDIT      Most Recent Value   Initial Alcohol Screen: US AUDIT-C    1  How often do you have a drink containing alcohol?  0 Filed at: 07/27/2020 0151   2  How many drinks containing alcohol do you have on a typical day you are drinking? 0 Filed at: 07/27/2020 0151   3b  FEMALE Any Age, or MALE 65+: How often do you have 4 or more drinks on one occassion? 0 Filed at: 07/27/2020 0151   Audit-C Score  0 Filed at: 07/27/2020 0151                  MUMTAZ/DAST-10      Most Recent Value   How many times in the past year have you    Used an illegal drug or used a prescription medication for non-medical reasons?   Never Filed at: 07/27/2020 0151              Initial Sepsis Screening     Row Name 07/27/20 0225                Is the patient's history suggestive of a new or worsening infection? No  -BY        Suspected source of infection  --        Are two or more of the following signs & symptoms of infection both present and new to the patient?  --        Indicate SIRS criteria  --        If the answer is yes to both questions, suspicion of sepsis is present  --        If severe sepsis is present AND tissue hypoperfusion perists in the hour after fluid resuscitation or lactate > 4, the patient meets criteria for SEPTIC SHOCK  --        Are any of the following organ dysfunction criteria present within 6 hours of suspected infection and SIRS criteria that are NOT considered to be chronic conditions? --        Organ dysfunction  --        Date of presentation of severe sepsis  --        Time of presentation of severe sepsis  --        Tissue hypoperfusion persists in the hour after crystalloid fluid administration, evidenced, by either:  --        Was hypotension present within one hour of the conclusion of crystalloid fluid administration?  --        Date of presentation of septic shock  --        Time of presentation of septic shock  --          User Key  (r) = Recorded By, (t) = Taken By, (c) = Cosigned By    [address] Name Provider Type    BY Dusty Escobedo PA-C Physician Assistant                        MDM      Disposition  Final diagnoses:   None     ED Disposition     None      Follow-up Information    None         Patient's Medications   Discharge Prescriptions    No medications on file     No discharge procedures on file      PDMP Review     None          ED Provider  Electronically Signed by           Dusty Escobedo PA-C  07/27/20 0212

## 2020-07-27 NOTE — ED NOTES
Received call from ED  [address], upon attempting to discharge pt home pt now reporting SI  Pt to be moved to behavioral holding room        Iola Cockayne, RN  07/27/20 3608

## 2020-07-28 PROBLEM — F10.90 ALCOHOL USE: Status: ACTIVE | Noted: 2020-07-28

## 2020-07-28 PROBLEM — Z72.89 ALCOHOL USE: Status: ACTIVE | Noted: 2020-07-28

## 2020-07-28 PROBLEM — Z78.9 ALCOHOL USE: Status: ACTIVE | Noted: 2020-07-28

## 2020-07-28 LAB
ALBUMIN SERPL BCP-MCNC: 3.3 G/DL (ref 3.5–5)
ALP SERPL-CCNC: 109 U/L (ref 46–116)
ALT SERPL W P-5'-P-CCNC: 41 U/L (ref 12–78)
ANION GAP SERPL CALCULATED.3IONS-SCNC: 10 MMOL/L (ref 4–13)
AST SERPL W P-5'-P-CCNC: 16 U/L (ref 5–45)
ATRIAL RATE: 81 BPM
BASOPHILS # BLD AUTO: 0.04 THOUSANDS/ΜL (ref 0–0.1)
BASOPHILS NFR BLD AUTO: 0 % (ref 0–1)
BILIRUB SERPL-MCNC: 0.4 MG/DL (ref 0.2–1)
BUN SERPL-MCNC: 13 MG/DL (ref 5–25)
CALCIUM SERPL-MCNC: 9 MG/DL (ref 8.3–10.1)
CHLORIDE SERPL-SCNC: 102 MMOL/L (ref 100–108)
CHOLEST SERPL-MCNC: 237 MG/DL (ref 50–200)
CO2 SERPL-SCNC: 26 MMOL/L (ref 21–32)
CREAT SERPL-MCNC: 0.89 MG/DL (ref 0.6–1.3)
EOSINOPHIL # BLD AUTO: 0.03 THOUSAND/ΜL (ref 0–0.61)
EOSINOPHIL NFR BLD AUTO: 0 % (ref 0–6)
ERYTHROCYTE [DISTWIDTH] IN BLOOD BY AUTOMATED COUNT: 13.3 % (ref 11.6–15.1)
GFR SERPL CREATININE-BSD FRML MDRD: 75 ML/MIN/1.73SQ M
GLUCOSE SERPL-MCNC: 193 MG/DL (ref 65–140)
GLUCOSE SERPL-MCNC: 204 MG/DL (ref 65–140)
GLUCOSE SERPL-MCNC: 207 MG/DL (ref 65–140)
GLUCOSE SERPL-MCNC: 207 MG/DL (ref 65–140)
GLUCOSE SERPL-MCNC: 211 MG/DL (ref 65–140)
HCT VFR BLD AUTO: 42.2 % (ref 34.8–46.1)
HDLC SERPL-MCNC: 40 MG/DL
HGB BLD-MCNC: 13.7 G/DL (ref 11.5–15.4)
IMM GRANULOCYTES # BLD AUTO: 0.13 THOUSAND/UL (ref 0–0.2)
IMM GRANULOCYTES NFR BLD AUTO: 1 % (ref 0–2)
LDLC SERPL CALC-MCNC: 140 MG/DL (ref 0–100)
LITHIUM SERPL-SCNC: 0.4 MMOL/L (ref 0.5–1)
LYMPHOCYTES # BLD AUTO: 2.24 THOUSANDS/ΜL (ref 0.6–4.47)
LYMPHOCYTES NFR BLD AUTO: 19 % (ref 14–44)
MCH RBC QN AUTO: 31.6 PG (ref 26.8–34.3)
MCHC RBC AUTO-ENTMCNC: 32.5 G/DL (ref 31.4–37.4)
MCV RBC AUTO: 97 FL (ref 82–98)
MONOCYTES # BLD AUTO: 0.9 THOUSAND/ΜL (ref 0.17–1.22)
MONOCYTES NFR BLD AUTO: 8 % (ref 4–12)
NEUTROPHILS # BLD AUTO: 8.41 THOUSANDS/ΜL (ref 1.85–7.62)
NEUTS SEG NFR BLD AUTO: 72 % (ref 43–75)
NONHDLC SERPL-MCNC: 197 MG/DL
NRBC BLD AUTO-RTO: 0 /100 WBCS
P AXIS: 47 DEGREES
PLATELET # BLD AUTO: 426 THOUSANDS/UL (ref 149–390)
PMV BLD AUTO: 9.8 FL (ref 8.9–12.7)
POTASSIUM SERPL-SCNC: 3.8 MMOL/L (ref 3.5–5.3)
PR INTERVAL: 184 MS
PROT SERPL-MCNC: 7.3 G/DL (ref 6.4–8.2)
QRS AXIS: 13 DEGREES
QRSD INTERVAL: 70 MS
QT INTERVAL: 398 MS
QTC INTERVAL: 462 MS
RBC # BLD AUTO: 4.34 MILLION/UL (ref 3.81–5.12)
RPR SER QL: NORMAL
SODIUM SERPL-SCNC: 138 MMOL/L (ref 136–145)
T WAVE AXIS: 4 DEGREES
TRIGL SERPL-MCNC: 287 MG/DL
TSH SERPL DL<=0.05 MIU/L-ACNC: 2.15 UIU/ML (ref 0.36–3.74)
VENTRICULAR RATE: 81 BPM
WBC # BLD AUTO: 11.75 THOUSAND/UL (ref 4.31–10.16)

## 2020-07-28 PROCEDURE — 80178 ASSAY OF LITHIUM: CPT | Performed by: PSYCHIATRY & NEUROLOGY

## 2020-07-28 PROCEDURE — 99222 1ST HOSP IP/OBS MODERATE 55: CPT | Performed by: PHYSICIAN ASSISTANT

## 2020-07-28 PROCEDURE — 86592 SYPHILIS TEST NON-TREP QUAL: CPT | Performed by: NURSE PRACTITIONER

## 2020-07-28 PROCEDURE — 82948 REAGENT STRIP/BLOOD GLUCOSE: CPT

## 2020-07-28 PROCEDURE — 84443 ASSAY THYROID STIM HORMONE: CPT | Performed by: NURSE PRACTITIONER

## 2020-07-28 PROCEDURE — 99221 1ST HOSP IP/OBS SF/LOW 40: CPT | Performed by: PSYCHIATRY & NEUROLOGY

## 2020-07-28 PROCEDURE — 93010 ELECTROCARDIOGRAM REPORT: CPT | Performed by: INTERNAL MEDICINE

## 2020-07-28 PROCEDURE — 80061 LIPID PANEL: CPT | Performed by: NURSE PRACTITIONER

## 2020-07-28 PROCEDURE — 85025 COMPLETE CBC W/AUTO DIFF WBC: CPT | Performed by: NURSE PRACTITIONER

## 2020-07-28 PROCEDURE — 80053 COMPREHEN METABOLIC PANEL: CPT | Performed by: NURSE PRACTITIONER

## 2020-07-28 RX ORDER — GABAPENTIN 400 MG/1
800 CAPSULE ORAL 4 TIMES DAILY
Status: DISCONTINUED | OUTPATIENT
Start: 2020-07-28 | End: 2020-08-03 | Stop reason: HOSPADM

## 2020-07-28 RX ORDER — BUSPIRONE HYDROCHLORIDE 10 MG/1
20 TABLET ORAL 3 TIMES DAILY
Status: DISCONTINUED | OUTPATIENT
Start: 2020-07-28 | End: 2020-08-03 | Stop reason: HOSPADM

## 2020-07-28 RX ADMIN — SUCRALFATE 1 G: 1 TABLET ORAL at 06:23

## 2020-07-28 RX ADMIN — FOLIC ACID 1 MG: 1 TABLET ORAL at 08:13

## 2020-07-28 RX ADMIN — BUSPIRONE HYDROCHLORIDE 20 MG: 10 TABLET ORAL at 20:16

## 2020-07-28 RX ADMIN — CLONIDINE HYDROCHLORIDE 0.1 MG: 0.1 TABLET ORAL at 08:16

## 2020-07-28 RX ADMIN — HYDROXYZINE HYDROCHLORIDE 50 MG: 50 TABLET, FILM COATED ORAL at 17:12

## 2020-07-28 RX ADMIN — INSULIN LISPRO 2 UNITS: 100 INJECTION, SOLUTION INTRAVENOUS; SUBCUTANEOUS at 17:11

## 2020-07-28 RX ADMIN — GABAPENTIN 800 MG: 400 CAPSULE ORAL at 21:31

## 2020-07-28 RX ADMIN — METOPROLOL TARTRATE 12.5 MG: 25 TABLET ORAL at 20:18

## 2020-07-28 RX ADMIN — PANTOPRAZOLE SODIUM 40 MG: 40 TABLET, DELAYED RELEASE ORAL at 08:13

## 2020-07-28 RX ADMIN — GABAPENTIN 600 MG: 300 CAPSULE ORAL at 08:13

## 2020-07-28 RX ADMIN — LITHIUM CARBONATE 300 MG: 300 CAPSULE, GELATIN COATED ORAL at 08:13

## 2020-07-28 RX ADMIN — METOPROLOL TARTRATE 12.5 MG: 25 TABLET ORAL at 08:16

## 2020-07-28 RX ADMIN — NALTREXONE HYDROCHLORIDE 50 MG: 50 TABLET, FILM COATED ORAL at 08:13

## 2020-07-28 RX ADMIN — BUSPIRONE HYDROCHLORIDE 15 MG: 15 TABLET ORAL at 08:13

## 2020-07-28 RX ADMIN — HYDROXYZINE HYDROCHLORIDE 50 MG: 50 TABLET, FILM COATED ORAL at 23:48

## 2020-07-28 RX ADMIN — BUSPIRONE HYDROCHLORIDE 20 MG: 10 TABLET ORAL at 15:51

## 2020-07-28 RX ADMIN — GLIPIZIDE 5 MG: 5 TABLET ORAL at 08:16

## 2020-07-28 RX ADMIN — NICOTINE 1 PATCH: 7 PATCH TRANSDERMAL at 08:11

## 2020-07-28 RX ADMIN — LITHIUM CARBONATE 450 MG: 300 CAPSULE, GELATIN COATED ORAL at 21:31

## 2020-07-28 RX ADMIN — INSULIN LISPRO 2 UNITS: 100 INJECTION, SOLUTION INTRAVENOUS; SUBCUTANEOUS at 21:32

## 2020-07-28 RX ADMIN — SUCRALFATE 1 G: 1 TABLET ORAL at 21:31

## 2020-07-28 RX ADMIN — SUCRALFATE 1 G: 1 TABLET ORAL at 11:13

## 2020-07-28 RX ADMIN — CLONIDINE HYDROCHLORIDE 0.1 MG: 0.1 TABLET ORAL at 21:31

## 2020-07-28 RX ADMIN — GABAPENTIN 800 MG: 400 CAPSULE ORAL at 17:12

## 2020-07-28 RX ADMIN — INSULIN LISPRO 2 UNITS: 100 INJECTION, SOLUTION INTRAVENOUS; SUBCUTANEOUS at 11:55

## 2020-07-28 RX ADMIN — HYDROXYZINE HYDROCHLORIDE 50 MG: 50 TABLET, FILM COATED ORAL at 08:12

## 2020-07-28 RX ADMIN — AMLODIPINE BESYLATE 5 MG: 5 TABLET ORAL at 08:16

## 2020-07-28 RX ADMIN — GABAPENTIN 800 MG: 400 CAPSULE ORAL at 11:12

## 2020-07-28 RX ADMIN — SUCRALFATE 1 G: 1 TABLET ORAL at 15:51

## 2020-07-28 RX ADMIN — TRAZODONE HYDROCHLORIDE 50 MG: 50 TABLET ORAL at 21:37

## 2020-07-28 NOTE — ASSESSMENT & PLAN NOTE
Lab Results   Component Value Date    HGBA1C 7 0 (H) 05/14/2020       Recent Labs     07/27/20  1235   POCGLU 134       Blood Sugar Average: Last 72 hrs:  · Continue glipizide  · SSI + accuchek  · Diabetic diet

## 2020-07-28 NOTE — QUICK NOTE
Pt requested and received PRN hydroxyzine 50mg po at 1712 for increased anxiety  Pt is visibly anxious, tremulous at time of administration  Pt returned to room after making call to son

## 2020-07-28 NOTE — TREATMENT TEAM
AM rounds per report from previous shift: 61 51 81 [facility] no plan, increased depression and anxiety, stressors son recently diagnosed with cancer age 28,  previous admits at [facility], diabetic managed with glipizide - regular diet ordered, needs blood glucose monitoring orders, drinks six pack beer on weekends, hx: seizures, usually takes trazodone 200mg only prescribed 50mg upon admit

## 2020-07-28 NOTE — TREATMENT PLAN
TREATMENT PLAN REVIEW - [address] 48 y o  1965 female MRN: [id number]    [address] [place] [address] Room / Bed: Pavan Cosme 251/[facility] 251-01 Encounter: [id number]          Admit Date/Time:  7/27/2020  5:10 PM    Treatment Team: Attending Provider: Mae Dean DO; Consulting Physician: Antelmo Wheeler MD; Registered Nurse: David Henry RN; Patient Care Technician: Geraldina Hatchet; Patient Care Assistant: Teagan Atwood; Registered Nurse: Rebekah Gould, RN; Registered Nurse: Cesar Rolon, MARK ANTHONY; Care Manager: Randall Arechiga RN; : Amador Tang; Occupational Therapy Assistant: Saba Watson [address] [place] [address];  Therapy Student: Padma Conley; Security: Roderick Minor    Diagnosis: Principal Problem:    Bipolar disorder ([facility] )  Active Problems:    Generalized anxiety disorder    Alcohol use    Essential hypertension    Acquired hypothyroidism    Post-traumatic stress disorder, chronic    Medical clearance for psychiatric admission    Type 2 diabetes mellitus without complication, without long-term current use of insulin ([facility] )      Patient Strengths/Assets: patient is on a voluntary commitment    Patient Barriers/Limitations: difficulty adapting, substance abuse, limited insight    Short Term Goals: decrease in depressive symptoms, decrease in anxiety symptoms    Long Term Goals: improvement in anxiety, resolution of depressive symptoms, stabilization of mood, free of suicidal thoughts    Progress Towards Goals: starting psychiatric medications as prescribed, starting alcohol detoxification protocol    Recommended Treatment: medication management, patient medication education, group therapy, milieu therapy, continued Behavioral Health psychiatric evaluation/assessment process    Treatment Frequency: daily medication monitoring, group and milieu therapy daily, monitoring through interdisciplinary rounds, monitoring through weekly patient care conferences    Expected Discharge Date:  5-7 midnights    Discharge Plan: referral for outpatient drug and alcohol counseling, referral for outpatient medication management with a psychiatrist, referral for outpatient psychotherapy, referrals as indicated    Treatment Plan Created/Updated By: Venkat Santos DO
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
## 2020-07-28 NOTE — ASSESSMENT & PLAN NOTE
· Vital signs stable at time of assessment  · CBC, CMP, TSH pending  · Patient appears medically stable at this time for inpatient psychiatric treatment

## 2020-07-28 NOTE — QUICK NOTE
Pt received PRN hydroxyzine 50mg po at 21  for increased anxiety  Upon reassessment pt was asleep in bed  At approximately 1030 pt awake and asking for more PRN medications  Encouraged to walk halls or sit in Day Room, declined sound machine/radio  Pt due for scheduled meds in 1 5 hrs  Will continue to monitor and support

## 2020-07-28 NOTE — PROGRESS NOTES
07/28/20 1000 07/28/20 1100 07/28/20 1415   Activity/Group Checklist   Group [employer] meeting  (Gifts of recovery/goals) Life Skills  (emotional resilieny) Other (Comment)  (creative expression group)   Attendance Did not attend Did not attend Did not attend   Pt seclusive to her room throughout the day  She declined to attend therapeutic groups

## 2020-07-28 NOTE — CONSULTS
Consult- Migue Rasmussen 1965, 48 y o  female MRN: [id number]    Unit/Bed#: [facility]-01 Encounter: [id number]    Primary Care Provider: YONG Genao   Date and time admitted to hospital: 7/27/2020  5:10 PM    Inpatient consult for Medical Clearance for [facility] patient  Consult performed by: Alexandra Henriquez PA-C  Consult ordered by: YONG Puckett        Medical clearance for psychiatric admission  Assessment & Plan  · Vital signs stable at time of assessment  · CBC, CMP, TSH pending  · Patient appears medically stable at this time for inpatient psychiatric treatment    Essential hypertension  Assessment & Plan  · Blood pressure stable  · Continue amlodipine, clonidine, metoprolol    Type 2 diabetes mellitus without complication, without long-term current use of insulin (HCC)  Assessment & Plan  Lab Results   Component Value Date    HGBA1C 7 0 (H) 05/14/2020       Recent Labs     07/27/20  1235   POCGLU 134       Blood Sugar Average: Last 72 hrs:  · Continue glipizide  · SSI + accuchek  · Diabetic diet    Generalized anxiety disorder  Assessment & Plan  · Presented to the ED with worsening depression/anxiety  · Further plan per psychiatry    Acquired hypothyroidism  Assessment & Plan  · Repeat TSH pending - stable previously  · Not currently on medication      VTE Prophylaxis: Reason for no pharmacologic prophylaxis low risk, ambulate  / reason for no mechanical VTE prophylaxis psychiatric unit, ambulate     Recommendations for Discharge:  · Follow up with PCP after discharge    Counseling / Coordination of Care Time: 20 minutes  Greater than 50% of total time spent on patient counseling and coordination of care  Collaboration of Care: Were Recommendations Directly Discussed with Primary Treatment Team? - No     History of Present Illness:    Migue Rasmussen is a 48 y o  female who is originally admitted to the psychiatry service due to depression/anxiety   We are consulted for medical clearance  Past medical history significant for bipolar disorder, essential hypertension, type 2 DM  Patient states she came to the ED because she has been having worsening depression/anxiety  Denies any physical complaints this morning including chest pain/palpitations, shortness of breath, nausea/vomiting, abdominal pain  Review of Systems:    Review of Systems   Constitutional: Positive for fatigue  Negative for chills, fever and unexpected weight change  HENT: Negative for congestion, sore throat and trouble swallowing  Eyes: Negative for photophobia, pain and visual disturbance  Respiratory: Negative for cough, shortness of breath and wheezing  Cardiovascular: Negative for chest pain, palpitations and leg swelling  Gastrointestinal: Negative for abdominal pain, constipation, diarrhea, nausea and vomiting  Endocrine: Negative for polyuria  Genitourinary: Negative for difficulty urinating, dysuria, flank pain, hematuria and urgency  Musculoskeletal: Negative for back pain, myalgias, neck pain and neck stiffness  Skin: Negative for pallor and rash  Neurological: Negative for dizziness, tremors, syncope, weakness, light-headedness, numbness and headaches  Hematological: Does not bruise/bleed easily  Psychiatric/Behavioral: Positive for dysphoric mood  Negative for agitation and confusion  The patient is nervous/anxious          Past Medical and Surgical History:     Past Medical History:   Diagnosis Date    Alcohol abuse     Alcoholism ([facility] )     Anxiety     Bipolar disorder (HCC)     Bowel obstruction (HCC)     Depression     Gastritis     Head injury     Heart palpitations     Hyperlipidemia     Hypertension     Hypothyroidism     Psychiatric illness     PTSD (post-traumatic stress disorder)     Seizure ([facility] )     Sleep difficulties     Suicide attempt [facility])        Past Surgical History:   Procedure Laterality Date    ABDOMINAL SURGERY      APPENDECTOMY      BOWEL RESECTION      CHOLECYSTECTOMY      ESOPHAGOGASTRODUODENOSCOPY N/A 5/18/2018    Procedure: ESOPHAGOGASTRODUODENOSCOPY (EGD) with bx;  Surgeon: Kasandra Iniguez DO;  Location: [facility]; Service: Gastroenterology    HYSTERECTOMY      KNEE SURGERY Bilateral        Meds/Allergies:    all medications and allergies reviewed    Allergies: Allergies   Allergen Reactions    Latex Rash       Social History:     Marital Status:     Substance Use History:   Social History     Substance and Sexual Activity   Alcohol Use Yes    Alcohol/week: 6 0 standard drinks    Types: 6 Cans of beer per week    Frequency: 4 or more times a week    Drinks per session: 5 or 6    Binge frequency: Weekly    Comment: patient reports today that she does not drink     Social History     Tobacco Use   Smoking Status Current Every Day Smoker    Packs/day: 1 00    Years: 25 00    Pack years: 25 00    Types: Cigarettes   Smokeless Tobacco Never Used     Social History     Substance and Sexual Activity   Drug Use No       Family History:    Family History   Problem Relation Age of Onset    Diabetes Father        Physical Exam:     Vitals:   Blood Pressure: 125/65 (07/28/20 0731)  Pulse: 88 (07/28/20 0731)  Temperature: 98 °F (36 7 °C) (07/28/20 0731)  Temp Source: Tympanic (07/28/20 0731)  Respirations: 18 (07/28/20 0731)  Height: 5' 3" (160 cm) (07/27/20 1710)  Weight - Scale: 92 5 kg (204 lb)(Waist-43in) (07/27/20 1710)  SpO2: 95 % (07/27/20 1710)    Physical Exam   Constitutional: She is oriented to person, place, and time  Vital signs are normal  She appears well-developed  Appears comfortable, no acute distress   HENT:   Head: Normocephalic  Eyes: Pupils are equal, round, and reactive to light  Conjunctivae and EOM are normal  No scleral icterus  Neck: Normal range of motion  Cardiovascular: Normal rate, regular rhythm and normal heart sounds  No murmur heard    Pulmonary/Chest: Effort normal and breath sounds normal  No respiratory distress  She has no wheezes  She has no rhonchi  She has no rales  Abdominal: Soft  Bowel sounds are normal  There is no tenderness  There is no rigidity, no rebound and no guarding  Musculoskeletal: She exhibits no edema, tenderness or deformity  Able to ambulate without difficulty, no edema   Neurological: She is alert and oriented to person, place, and time  Skin: Skin is warm and dry  Psychiatric: She exhibits a depressed mood  Nursing note and vitals reviewed  Additional Data:     Lab Results: I have personally reviewed pertinent reports  Lab Results   Component Value Date/Time    HGBA1C 7 0 (H) 05/14/2020 05:36 AM    HGBA1C 5 4 11/15/2019 05:48 AM    HGBA1C 5 6 05/26/2019 06:26 AM    HGBA1C 5 2 09/01/2018 06:24 AM     Results from last 7 days   Lab Units 07/27/20  1235   POC GLUCOSE mg/dl 134           Imaging: I have personally reviewed pertinent reports  No orders to display       EKG, Pathology, and Other Studies Reviewed on Admission:   · Reviewed as above    ** Please Note: This note has been constructed using a voice recognition system   **

## 2020-07-28 NOTE — PROGRESS NOTES
Pt is cooperative and pleasant during interaction  Reports elevated anxiety and panic attacks at home with symptoms of shaking, difficulty speaking, heart pounding in chest and head  Pt states she is tired of going to emergency rooms and just wants to get help  Denies any thoughts of self harm  Reports sleeping ok overnight

## 2020-07-29 LAB
GLUCOSE SERPL-MCNC: 128 MG/DL (ref 65–140)
GLUCOSE SERPL-MCNC: 140 MG/DL (ref 65–140)
GLUCOSE SERPL-MCNC: 183 MG/DL (ref 65–140)
GLUCOSE SERPL-MCNC: 198 MG/DL (ref 65–140)

## 2020-07-29 PROCEDURE — 99232 SBSQ HOSP IP/OBS MODERATE 35: CPT | Performed by: NURSE PRACTITIONER

## 2020-07-29 PROCEDURE — 82948 REAGENT STRIP/BLOOD GLUCOSE: CPT

## 2020-07-29 RX ADMIN — SUCRALFATE 1 G: 1 TABLET ORAL at 08:32

## 2020-07-29 RX ADMIN — INSULIN LISPRO 1 UNITS: 100 INJECTION, SOLUTION INTRAVENOUS; SUBCUTANEOUS at 12:15

## 2020-07-29 RX ADMIN — BUSPIRONE HYDROCHLORIDE 20 MG: 10 TABLET ORAL at 21:24

## 2020-07-29 RX ADMIN — GABAPENTIN 800 MG: 400 CAPSULE ORAL at 12:25

## 2020-07-29 RX ADMIN — GABAPENTIN 800 MG: 400 CAPSULE ORAL at 17:16

## 2020-07-29 RX ADMIN — SUCRALFATE 1 G: 1 TABLET ORAL at 21:25

## 2020-07-29 RX ADMIN — NICOTINE 1 PATCH: 7 PATCH TRANSDERMAL at 08:38

## 2020-07-29 RX ADMIN — GABAPENTIN 800 MG: 400 CAPSULE ORAL at 21:24

## 2020-07-29 RX ADMIN — PANTOPRAZOLE SODIUM 40 MG: 40 TABLET, DELAYED RELEASE ORAL at 08:33

## 2020-07-29 RX ADMIN — AMLODIPINE BESYLATE 5 MG: 5 TABLET ORAL at 08:32

## 2020-07-29 RX ADMIN — CLONIDINE HYDROCHLORIDE 0.1 MG: 0.1 TABLET ORAL at 08:32

## 2020-07-29 RX ADMIN — INSULIN LISPRO 2 UNITS: 100 INJECTION, SOLUTION INTRAVENOUS; SUBCUTANEOUS at 08:29

## 2020-07-29 RX ADMIN — HYDROXYZINE HYDROCHLORIDE 50 MG: 50 TABLET, FILM COATED ORAL at 09:22

## 2020-07-29 RX ADMIN — FOLIC ACID 1 MG: 1 TABLET ORAL at 08:33

## 2020-07-29 RX ADMIN — BUSPIRONE HYDROCHLORIDE 20 MG: 10 TABLET ORAL at 15:49

## 2020-07-29 RX ADMIN — SUCRALFATE 1 G: 1 TABLET ORAL at 12:25

## 2020-07-29 RX ADMIN — METOPROLOL TARTRATE 12.5 MG: 25 TABLET ORAL at 08:33

## 2020-07-29 RX ADMIN — SUCRALFATE 1 G: 1 TABLET ORAL at 15:49

## 2020-07-29 RX ADMIN — BUSPIRONE HYDROCHLORIDE 20 MG: 10 TABLET ORAL at 08:33

## 2020-07-29 RX ADMIN — GLIPIZIDE 5 MG: 5 TABLET ORAL at 08:33

## 2020-07-29 RX ADMIN — METOPROLOL TARTRATE 12.5 MG: 25 TABLET ORAL at 21:24

## 2020-07-29 RX ADMIN — LITHIUM CARBONATE 450 MG: 300 CAPSULE, GELATIN COATED ORAL at 21:24

## 2020-07-29 RX ADMIN — GABAPENTIN 800 MG: 400 CAPSULE ORAL at 08:33

## 2020-07-29 RX ADMIN — TRAZODONE HYDROCHLORIDE 50 MG: 50 TABLET ORAL at 23:38

## 2020-07-29 RX ADMIN — NALTREXONE HYDROCHLORIDE 50 MG: 50 TABLET, FILM COATED ORAL at 08:33

## 2020-07-29 RX ADMIN — HYDROXYZINE HYDROCHLORIDE 50 MG: 50 TABLET, FILM COATED ORAL at 14:40

## 2020-07-29 RX ADMIN — CLONIDINE HYDROCHLORIDE 0.1 MG: 0.1 TABLET ORAL at 21:25

## 2020-07-29 RX ADMIN — LITHIUM CARBONATE 300 MG: 300 CAPSULE, GELATIN COATED ORAL at 08:33

## 2020-07-29 NOTE — PROGRESS NOTES
Progress Note - Behavioral Health     Mandeep Crabtree 48 y o  female MRN: [id number]   Unit/Bed#: [facility] 251-01 Encounter: [id number]        Ce Wolfe was seen today for continuation of care, records reviewed, patient was discussed in morning case reviewed team   Per nursing report patient required Atarax at 5:00 p m  Yesterday and prior to sleep last evening for increased anxiety levels she also required trazodone for sleep  After she received her p r n  Medication she slept through the night without issues  She did not have any behavioral issues on the unit and has been appropriate  Ce Wolfe states she woke today with "my heart racing, I was put on metoprolol for it by my doctor "  She states after she took her AM medications this resolved  She denies SI/HI, denies AH/VH  She reports depression at 1/10, 10 being the worst and anxiety at 6/10 before medicine and now 1/10, 10 being the worst   She states "I came to learn how to learn to deal with my anxiety "  She reports PRN atarax is working very well for her anxiety  She denies side effects to medications and is tolerating them well        Sleep: slept better  Appetite: normal  Medication side effects: No   ROS: no complaints, all other systems are negative    Mental Status Evaluation:    Appearance:  wearing hospital clothes   Behavior:  pleasant, cooperative, calm, good eye contact   Speech:  scant, soft   Mood:  depressed, anxious   Affect:  blunted   Thought Process:  linear   Associations: intact associations   Thought Content:  no overt delusions   Perceptual Disturbances: no auditory hallucinations, no visual hallucinations   Risk Potential: Suicidal ideation - None  Homicidal ideation - None  Potential for aggression - No   Sensorium:  oriented to person, place and time/date   Memory:  recent and remote memory grossly intact   Consciousness:  alert and awake   Attention: attention span and concentration are age appropriate   Insight:  fair Judgment: fair   Gait/Station: normal gait/station, normal balance   Motor Activity: no abnormal movements     Vital signs in last 24 hours:    Temp:  [97 5 °F (36 4 °C)-98 7 °F (37 1 °C)] 98 7 °F (37 1 °C)  HR:  [] 98  Resp:  [20] 20  BP: (125-139)/(60-90) 139/69    Laboratory results:    I have personally reviewed all pertinent laboratory/tests results    Most Recent Labs:   Lab Results   Component Value Date    WBC 11 75 (H) 07/28/2020    RBC 4 34 07/28/2020    HGB 13 7 07/28/2020    HCT 42 2 07/28/2020     (H) 07/28/2020    RDW 13 3 07/28/2020    NEUTROABS 8 41 (H) 07/28/2020    SODIUM 138 07/28/2020    K 3 8 07/28/2020     07/28/2020    CO2 26 07/28/2020    BUN 13 07/28/2020    CREATININE 0 89 07/28/2020    GLUC 204 (H) 07/28/2020    GLUF 119 (H) 12/18/2019    CALCIUM 9 0 07/28/2020    AST 16 07/28/2020    ALT 41 07/28/2020    ALKPHOS 109 07/28/2020    TP 7 3 07/28/2020    ALB 3 3 (L) 07/28/2020    TBILI 0 40 07/28/2020    CHOLESTEROL 237 (H) 07/28/2020    HDL 40 07/28/2020    TRIG 287 (H) 07/28/2020    LDLCALC 140 (H) 07/28/2020    NONHDLC 197 07/28/2020    LITHIUM 0 4 (L) 07/28/2020    AMMONIA <9 (L) 02/10/2020    [id number] 2 146 07/28/2020    FREET4 0 91 05/13/2020    PREGSERUM Negative 11/15/2019    RPR Non-Reactive 07/28/2020    HGBA1C 7 0 (H) 05/14/2020     05/14/2020     CBC:   Lab Results   Component Value Date    WBC 11 75 (H) 07/28/2020    RBC 4 34 07/28/2020    HGB 13 7 07/28/2020    HCT 42 2 07/28/2020    MCV 97 07/28/2020     (H) 07/28/2020    MCH 31 6 07/28/2020    MCHC 32 5 07/28/2020    RDW 13 3 07/28/2020    MPV 9 8 07/28/2020    NRBC 0 07/28/2020    NEUTROABS 8 41 (H) 07/28/2020     BMP:   Lab Results   Component Value Date    SODIUM 138 07/28/2020    K 3 8 07/28/2020     07/28/2020    CO2 26 07/28/2020    AGAP 10 07/28/2020    BUN 13 07/28/2020    CREATININE 0 89 07/28/2020    GLUC 204 (H) 07/28/2020    GLUF 119 (H) 12/18/2019    CALCIUM 9 0 07/28/2020    EGFR 75 07/28/2020     CMP:   Lab Results   Component Value Date    SODIUM 138 07/28/2020    K 3 8 07/28/2020     07/28/2020    CO2 26 07/28/2020    AGAP 10 07/28/2020    BUN 13 07/28/2020    CREATININE 0 89 07/28/2020    GLUC 204 (H) 07/28/2020    GLUF 119 (H) 12/18/2019    CALCIUM 9 0 07/28/2020    AST 16 07/28/2020    ALT 41 07/28/2020    ALKPHOS 109 07/28/2020    TP 7 3 07/28/2020    ALB 3 3 (L) 07/28/2020    TBILI 0 40 07/28/2020    EGFR 75 07/28/2020     Lipid Profile:   Lab Results   Component Value Date    CHOLESTEROL 237 (H) 07/28/2020    HDL 40 07/28/2020    TRIG 287 (H) 07/28/2020    LDLCALC 140 (H) 07/28/2020    [place] 197 07/28/2020     Liver Enzymes:   Lab Results   Component Value Date    AST 16 07/28/2020    ALT 41 07/28/2020    ALKPHOS 109 07/28/2020    TP 7 3 07/28/2020    ALB 3 3 (L) 07/28/2020    TBILI 0 40 07/28/2020    BILIDIR 0 10 03/13/2020     Thyroid Studies:   Lab Results   Component Value Date    GMR0OMEENNCK 2 146 07/28/2020    FREET4 0 91 05/13/2020     RPR:   Lab Results   Component Value Date    RPR Non-Reactive 07/28/2020     Hemoglobin A1C/EST AVG Glucose   Lab Results   Component Value Date    HGBA1C 7 0 (H) 05/14/2020     05/14/2020     COVID19:   Lab Results   Component Value Date    SARSCOV2 Negative 07/27/2020     Pregnancy:   Lab Results   Component Value Date    PREGUR negative 02/10/2020    PREGSERUM Negative 11/15/2019     Medication Drug Levels: No results found for: CBMZFREE, PHENOBARB, PHENYTOIN, VALPROICTOT, CARBAMAZEPIN, LAMOTRIGINE, LEVETIRACETA, TOPIRAMATE  EKG   Lab Results   Component Value Date    VENTRATE 81 07/27/2020    ATRIALRATE 81 07/27/2020    PRINT 184 07/27/2020    QRSDINT 70 07/27/2020    QTINT 398 07/27/2020    QTCINT 462 07/27/2020    PAXIS 47 07/27/2020    QRSAXIS 13 07/27/2020    TWAVEAXIS 4 07/27/2020       Suicide/Homicide Risk Assessment:    Risk of Harm to Self:   Nursing Suicide Risk Assessment Last 24 hours:  ; Moderate Risk to Self: Age 48 or older ;      Risk of Harm to Others:  Nursing Homicide Risk Assessment: Violence Risk to Others: Denies within past 6 months    The following interventions are recommended: behavioral checks every 7 minutes, continued hospitalization on locked unit    Progress Toward Goals: making slow improvement, attends groups more often, participates more in milieu therapy, depression is slowly improving, slightly less anxious    Assessment/Plan   Principal Problem:    Bipolar disorder ([place] [address] )  Active Problems:    Essential hypertension    Acquired hypothyroidism    Post-traumatic stress disorder, chronic    Generalized anxiety disorder    Medical clearance for psychiatric admission    Type 2 diabetes mellitus without complication, without long-term current use of insulin ([place] [address] )    Alcohol use    Recommended Treatment:     Planned medication and treatment changes: All current active medications have been reviewed  Encourage group therapy, milieu therapy and occupational therapy  Behavioral Health checks every 7 minutes  Status of Admission Status of Admission  Status of Admission: 201  Observe over the weekend if continues to improve  Possible discharge after the weekend if continues to improve  -Discontinue Ativan 1 mg p o  P r n  As patient is requesting to be managed on medications that she will be able to take after discharge    Patient has been tolerating Atarax well and has been having good response   -re-check lithium level on Friday 7/31/2020  -continue all other medications as prescribed    Current Facility-Administered Medications:  acetaminophen 650 mg Oral Q6H PRN Enedina Ambrosia, CRNP   acetaminophen 650 mg Oral Q4H PRN Enedina Ambrosia, CRNP   acetaminophen 975 mg Oral Q6H PRN Enedina Ambrosia, CRNP   aluminum-magnesium hydroxide-simethicone 30 mL Oral Q4H PRN Enedina Ambrosia, CRNP   amLODIPine 5 mg Oral Daily Enedina Ambrosia, CRNP   benztropine 1 mg Intramuscular Q6H PRN Muriel Quill Kleber, CRNP   benztropine 1 mg Oral Q6H PRN Victorine Oar, CRNP   busPIRone 20 mg Oral TID Wilma Valles, DO   cloNIDine 0 1 mg Oral Q12H [address] Victorine Oar, CRNP   folic acid 1 mg Oral Daily Victorine Oar, CRNP   gabapentin 800 mg Oral 4x Daily Wilma Valles, DO   glipiZIDE 5 mg Oral Daily Victorine Oar, CRNP   haloperidol 5 mg Oral Q6H PRN Victorine Oar, CRNP   haloperidol lactate 5 mg Intramuscular Q6H PRN Victorine Oar, CRNP   hydrOXYzine HCL 25 mg Oral Q6H PRN Victorine Oar, CRNP   hydrOXYzine HCL 50 mg Oral Q4H PRN Victorine Oar, CRNP   insulin lispro 1-6 Units Subcutaneous TID AC Mena Bass PA-C   insulin lispro 1-6 Units Subcutaneous HS Tiffany Cho PA-C   lithium carbonate 300 mg Oral QAM Victorine Oar, CRNP   lithium carbonate 450 mg Oral HS Victorine Oar, CRNP   LORazepam 1 mg Intramuscular Q6H PRN Victorine Oar, CRNP   magnesium hydroxide 30 mL Oral Daily PRN Victorine Oar, CRNP   metoprolol tartrate 12 5 mg Oral Q12H [address] Victorine Oar, CRNP   naltrexone 50 mg Oral Daily Victorine Oar, CRNP   nicotine 1 patch Transdermal Daily Marium Teranreggine, DO   nicotine polacrilex 2 mg Oral Q2H PRN Marium Borreggine, DO   OLANZapine 5 mg Intramuscular Q3H PRN Victorine Oar, CRNP   OLANZapine 5 mg Oral Q3H PRN Victorine Oar, CRNP   pantoprazole 40 mg Oral Daily Victorine Oar, CRNP   risperiDONE 1 mg Oral Q6H PRN Victorine Oar, CRNP   sucralfate 1 g Oral 4x Daily (AC & HS) Victorine Oar, CRNP   traZODone 50 mg Oral HS PRN Victorine Oar, CRNP       Risks / Benefits of Treatment:    Risks, benefits, and possible side effects of medications explained to patient and patient verbalizes understanding and agreement for treatment  Risks of medications in pregnancy explained if female patient  Patient verbalizes understanding and agrees to notify her doctor if she becomes pregnant      Counseling / Coordination of Care:    Patient's progress discussed with staff in treatment team meeting  Medications, treatment progress and treatment plan reviewed with patient  Recent stressors including son's illness discussed with patient  Coping strategies reviewed with patient  Importance of medication and treatment compliance reviewed with patient  Supportive counseling provided to the patient

## 2020-07-29 NOTE — PROGRESS NOTES
Patient had some difficulty initiating sleep  Took PRN atarax 50 mg PO at 2348 for increased anxiety Wolfe score 18  Medication appears to have been effective as patient was noted to be asleep within the hour and is still sleeping now

## 2020-07-29 NOTE — PROGRESS NOTES
07/29/20 1550   Team Meeting   Meeting Type Daily Rounds   Team Members Present   Team Members Present Physician;Nurse;;Occupational Therapist   Physician Team Member Dr Angeles   Nursing Team Member [facility] Management Team Member [id number]    OT Team Member [facility]    Patient/Family Present   Patient Present No   Patient's Family Present No     Patient required PRN due to increased anxiety and rapid heart rate  Patient compliant with medications  ,

## 2020-07-29 NOTE — PROGRESS NOTES
Pt reports sleeping ok overnight  Feeling anxious this morning and received PRN Atarax 50mg  Pt reports relief of symptoms  Pt states she often feels more anxious in the morning, waking with the feeling of heart racing  Pt states she did discuss this with doctor/AP  Denies SI/HI/AVH
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
## 2020-07-29 NOTE — QUICK NOTE
Pt has verbalized to this writer that Atarax 50 mg po administered at 1440 for moderate anxiety was effective in making her feel calmer

## 2020-07-29 NOTE — PROGRESS NOTES
07/29/20 1000 07/29/20 1100 07/29/20 1330   Activity/Group Checklist   Group Community meeting  (self talk/motivation and goals) Nursing Education  (Nutrition Trivia - led by nursing students) Life Skills  (values exploration/identifying core beliefs )   Attendance Did not attend Did not attend Did not attend   Pt remains seclusive to her room throughout the day declining to attend therapeutic groups

## 2020-07-30 LAB
GLUCOSE SERPL-MCNC: 131 MG/DL (ref 65–140)
GLUCOSE SERPL-MCNC: 142 MG/DL (ref 65–140)
GLUCOSE SERPL-MCNC: 181 MG/DL (ref 65–140)
GLUCOSE SERPL-MCNC: 200 MG/DL (ref 65–140)

## 2020-07-30 PROCEDURE — 99232 SBSQ HOSP IP/OBS MODERATE 35: CPT | Performed by: NURSE PRACTITIONER

## 2020-07-30 PROCEDURE — 82948 REAGENT STRIP/BLOOD GLUCOSE: CPT

## 2020-07-30 RX ADMIN — GABAPENTIN 800 MG: 400 CAPSULE ORAL at 17:08

## 2020-07-30 RX ADMIN — HYDROXYZINE HYDROCHLORIDE 75 MG: 50 TABLET, FILM COATED ORAL at 12:15

## 2020-07-30 RX ADMIN — LITHIUM CARBONATE 300 MG: 300 CAPSULE, GELATIN COATED ORAL at 09:27

## 2020-07-30 RX ADMIN — GABAPENTIN 800 MG: 400 CAPSULE ORAL at 09:26

## 2020-07-30 RX ADMIN — INSULIN LISPRO 2 UNITS: 100 INJECTION, SOLUTION INTRAVENOUS; SUBCUTANEOUS at 12:11

## 2020-07-30 RX ADMIN — INSULIN LISPRO 1 UNITS: 100 INJECTION, SOLUTION INTRAVENOUS; SUBCUTANEOUS at 09:27

## 2020-07-30 RX ADMIN — NICOTINE POLACRILEX 2 MG: 2 GUM, CHEWING BUCCAL at 17:35

## 2020-07-30 RX ADMIN — LITHIUM CARBONATE 450 MG: 300 CAPSULE, GELATIN COATED ORAL at 21:14

## 2020-07-30 RX ADMIN — GABAPENTIN 800 MG: 400 CAPSULE ORAL at 21:14

## 2020-07-30 RX ADMIN — PANTOPRAZOLE SODIUM 40 MG: 40 TABLET, DELAYED RELEASE ORAL at 09:27

## 2020-07-30 RX ADMIN — METOPROLOL TARTRATE 12.5 MG: 25 TABLET ORAL at 09:27

## 2020-07-30 RX ADMIN — SUCRALFATE 1 G: 1 TABLET ORAL at 16:10

## 2020-07-30 RX ADMIN — GABAPENTIN 800 MG: 400 CAPSULE ORAL at 12:11

## 2020-07-30 RX ADMIN — HYDROXYZINE HYDROCHLORIDE 75 MG: 50 TABLET, FILM COATED ORAL at 18:28

## 2020-07-30 RX ADMIN — SUCRALFATE 1 G: 1 TABLET ORAL at 12:11

## 2020-07-30 RX ADMIN — NALTREXONE HYDROCHLORIDE 50 MG: 50 TABLET, FILM COATED ORAL at 09:26

## 2020-07-30 RX ADMIN — BUSPIRONE HYDROCHLORIDE 20 MG: 10 TABLET ORAL at 20:20

## 2020-07-30 RX ADMIN — AMLODIPINE BESYLATE 5 MG: 5 TABLET ORAL at 09:26

## 2020-07-30 RX ADMIN — HYDROXYZINE HYDROCHLORIDE 50 MG: 50 TABLET, FILM COATED ORAL at 03:23

## 2020-07-30 RX ADMIN — BUSPIRONE HYDROCHLORIDE 20 MG: 10 TABLET ORAL at 09:27

## 2020-07-30 RX ADMIN — SUCRALFATE 1 G: 1 TABLET ORAL at 06:40

## 2020-07-30 RX ADMIN — CLONIDINE HYDROCHLORIDE 0.1 MG: 0.1 TABLET ORAL at 09:26

## 2020-07-30 RX ADMIN — METOPROLOL TARTRATE 12.5 MG: 25 TABLET ORAL at 21:34

## 2020-07-30 RX ADMIN — TRAZODONE HYDROCHLORIDE 50 MG: 50 TABLET ORAL at 21:36

## 2020-07-30 RX ADMIN — SUCRALFATE 1 G: 1 TABLET ORAL at 21:13

## 2020-07-30 RX ADMIN — NICOTINE 1 PATCH: 7 PATCH TRANSDERMAL at 09:28

## 2020-07-30 RX ADMIN — GLIPIZIDE 5 MG: 5 TABLET ORAL at 09:27

## 2020-07-30 RX ADMIN — CLONIDINE HYDROCHLORIDE 0.1 MG: 0.1 TABLET ORAL at 21:35

## 2020-07-30 RX ADMIN — BUSPIRONE HYDROCHLORIDE 20 MG: 10 TABLET ORAL at 16:10

## 2020-07-30 RX ADMIN — FOLIC ACID 1 MG: 1 TABLET ORAL at 09:27

## 2020-07-30 NOTE — PROGRESS NOTES
Pt remains seclusive for first part of the day reporting she had poor sleep overnight  In the afternoon, pt agreeable to remain awake so to try to be able to sleep well tonight  Denies SI/HI-verbally contracts for safety  States she continues to struggle with anxiety at times and wants to work on finding healthy coping skills to decrease this  Encouraged to attend afternoon group as it was focusing on relaxation techniques  Pt agreeable to this  Pt spoke of main stressor for anxiety is her sons recent diagnosis of CA  States her son has an appointment with oncology  on September 4th and wants to be able to "control this anxiety so I can stay strong for him " Denies any concerns regarding scheduled medications at this time

## 2020-07-30 NOTE — PROGRESS NOTES
Med Note: Pt requesting Atarax 75mg for elevated anxiety  On follow up, pt appears calm and relaxed, laying in bed talking with other RN  Pt smiling at writing and states the med had good effect

## 2020-07-30 NOTE — CASE MANAGEMENT
CM outreached to pt's pcp @ [facility] ([phone]) and left voice message requesting nurse to call this writer back to schedule an appointment post dc  CM will continue to follow

## 2020-07-30 NOTE — PROGRESS NOTES
Pt apparently slept most of the night without event after receiving Trazodone 50 mg at 2338 for sleep and then Atarax 50 mg at 0323 for moderate anxiety

## 2020-07-30 NOTE — PROGRESS NOTES
07/30/20 0800   Team Meeting   Meeting Type Tx Team Meeting   Team Members Present   Team Members Present Physician;Nurse;;Occupational Therapist   Physician Team Member Dr Dexter Jorgensen, [address]   Nursing Team Member [facility] Management Team Member [facility]   OT Team Member [facility]   Patient/Family Present   Patient Present No   Patient's Family Present No     Scant and seclusive  Slept poorly  Took Atarax due to anxiety  Likely dc back to [facility]  Likely dc early next week  Encouragement to go to groups

## 2020-07-30 NOTE — CASE MANAGEMENT
CM outreached to [facility] and spoke with Cha Johnson who has scheduled pt with an appointment on 8/4/20 @ 11 am with her therapist Rene Denton  CM advised that pt will be scheduled with Dr Zoraida Houston at this appointment which is a tele-psych appointment  Cm added this information to pt's dc instructions

## 2020-07-30 NOTE — PROGRESS NOTES
Pt reports enjoying afternoon group and wants to continue working on developing coping skills to help manage anxiety  Pleasant and cooperative  Denies SI-verbally contracts for safety

## 2020-07-30 NOTE — PROGRESS NOTES
Progress Note - Behavioral Health     Chaya Campbell 48 y o  female MRN: [id number]   Unit/Bed#: [facility] 251-01 Encounter: [id number]        Symone Rojas was seen today for continuation of care, records reviewed and patient was discussed in morning case review team   Per nursing report patient required PRN atartax for anxiety at 0323 am however slept through the rest of the night  She has been seclusive to room, eating well, compliant with meds, denies side effects  Symone Rojas rates depression 9 out of 10 and anxiety at 8 out of 10, 10 being the worst   She reports having increased anxiety last night "I was thinking about no calling my son when I told him I was going to and I fell asleep so I was feeling guilty that I didn't call him "  She states she feels the Atarax wears off quickly, discussed increasing PRN dose  Reports decreased motivation and appetite, she reports decreased happinsess       Sleep: slept off and on  Appetite: decreased  Medication side effects: No   ROS: reports back pain, all other systems are negative    Mental Status Evaluation:    Appearance:  wearing hospital clothes, slightly disheveled   Behavior:  cooperative, limited eye contact, very anxious   Speech:  normal rate, normal volume, normal pitch   Mood:  depressed, anxious   Affect:  constricted   Thought Process:  perseverative, negative thinking   Associations: intact associations   Thought Content:  no overt delusions, negative thinking   Perceptual Disturbances: no auditory hallucinations, no visual hallucinations   Risk Potential: Suicidal ideation - None  Homicidal ideation - None  Potential for aggression - No   Sensorium:  oriented to person, place and time/date   Consciousness:  alert and awake   Attention: attention span and concentration appear shorter than expected for age   Insight:  partial   Judgment: partial   Gait/Station: normal gait/station, normal balance   Motor Activity: no abnormal movements     Vital signs in last 24 hours:    Temp:  [98 °F (36 7 °C)-98 7 °F (37 1 °C)] 98 °F (36 7 °C)  HR:  [76-98] 78  Resp:  [17-20] 17  BP: (122-152)/(68-88) 152/68    Laboratory results:    I have personally reviewed all pertinent laboratory/tests results    Most Recent Labs:   Lab Results   Component Value Date    WBC 11 75 (H) 07/28/2020    RBC 4 34 07/28/2020    HGB 13 7 07/28/2020    HCT 42 2 07/28/2020     (H) 07/28/2020    RDW 13 3 07/28/2020    NEUTROABS 8 41 (H) 07/28/2020    SODIUM 138 07/28/2020    K 3 8 07/28/2020     07/28/2020    CO2 26 07/28/2020    BUN 13 07/28/2020    CREATININE 0 89 07/28/2020    GLUC 204 (H) 07/28/2020    GLUF 119 (H) 12/18/2019    CALCIUM 9 0 07/28/2020    AST 16 07/28/2020    ALT 41 07/28/2020    ALKPHOS 109 07/28/2020    TP 7 3 07/28/2020    ALB 3 3 (L) 07/28/2020    TBILI 0 40 07/28/2020    CHOLESTEROL 237 (H) 07/28/2020    HDL 40 07/28/2020    TRIG 287 (H) 07/28/2020    LDLCALC 140 (H) 07/28/2020    NONHDLC 197 07/28/2020    LITHIUM 0 4 (L) 07/28/2020    AMMONIA <9 (L) 02/10/2020    [id number] 2 146 07/28/2020    FREET4 0 91 05/13/2020    PREGSERUM Negative 11/15/2019    RPR Non-Reactive 07/28/2020    HGBA1C 7 0 (H) 05/14/2020     05/14/2020       Suicide/Homicide Risk Assessment:    Risk of Harm to Self:   Nursing Suicide Risk Assessment Last 24 hours:  ; Moderate Risk to Self: Age 48 or older ;      Risk of Harm to Others:  Nursing Homicide Risk Assessment: Violence Risk to Others: Denies within past 6 months    The following interventions are recommended: behavioral checks every 7 minutes, continued hospitalization on locked unit    Progress Toward Goals: no significant improvement, does not participate in milieu therapy, does not participate in groups, stays in the room    Assessment/Plan   Principal Problem:    Bipolar disorder ([facility] [address] )  Active Problems:    Essential hypertension    Acquired hypothyroidism    Post-traumatic stress disorder, chronic    Generalized anxiety disorder    Medical clearance for psychiatric admission    Type 2 diabetes mellitus without complication, without long-term current use of insulin ([address] )    Alcohol use    Recommended Treatment:     Planned medication and treatment changes:     All current active medications have been reviewed  Encourage group therapy, milieu therapy and occupational therapy-  [facility] checks every 7 minutes  -Consider Partial Hospitalization Program post discharge to work on coping skills for high anxiety levels  Status of Admission Status of Admission  Status of Admission: 201  Observe over the weekend if continues to improve  Increase PRN dose of atarax 50 mg Q 4 hrs to 75 mg q 6 hrs as needed  Continue all other medications as prescribed    Current Facility-Administered Medications:  acetaminophen 650 mg Oral Q6H PRN Jayson Gramajo, YONG   acetaminophen 650 mg Oral Q4H PRN Jayson Gramajo, YONG   acetaminophen 975 mg Oral Q6H PRN Jayson Gramajo, YONG   aluminum-magnesium hydroxide-simethicone 30 mL Oral Q4H PRN Jayson Gramajo, YONG   amLODIPine 5 mg Oral Daily YONG Molina   benztropine 1 mg Intramuscular Q6H PRN Jayson Gramajo, YONG   benztropine 1 mg Oral Q6H PRN Jayson Gramajo, YONG   busPIRone 20 mg Oral TID Jarrett Arthur,    cloNIDine 0 1 mg Oral Q12H [facility] Jayson Gramajo, YONG   folic acid 1 mg Oral Daily Jayson Gramajo, YONG   gabapentin 800 mg Oral 4x Daily Jarrett Arthur DO   glipiZIDE 5 mg Oral Daily YONG Molina   haloperidol 5 mg Oral Q6H PRN Jayson Gramajo, YONG   haloperidol lactate 5 mg Intramuscular Q6H PRN Jayson Gramajo, YONG   hydrOXYzine HCL 25 mg Oral Q6H PRN Jayson Gramajo, YONG   hydrOXYzine HCL 75 mg Oral Q6H PRN Jayson Gramajo, YONG   insulin lispro 1-6 Units Subcutaneous TID BE Bass PA-C   insulin lispro 1-6 Units Subcutaneous HS Tejinder Bass PA-C   lithium carbonate 300 mg Oral QAM YONG Molina   lithium carbonate 450 mg Oral HS Sierra ELSA Shahid, CRNP   LORazepam 1 mg Intramuscular Q6H PRN Ruthine English, CRNP   magnesium hydroxide 30 mL Oral Daily PRN Ruthine English, CRNP   metoprolol tartrate 12 5 mg Oral Q12H [address] Ruthine English, CRNP   naltrexone 50 mg Oral Daily Ruthine English, CRNP   nicotine 1 patch Transdermal Daily Marium Bryanginriley, DO   nicotine polacrilex 2 mg Oral Q2H PRN Marium Bryanginriley, DO   OLANZapine 5 mg Intramuscular Q3H PRN Ruthine English, CRNP   OLANZapine 5 mg Oral Q3H PRN Ruthine English, CRNP   pantoprazole 40 mg Oral Daily Ruthine English, CRNP   risperiDONE 1 mg Oral Q6H PRN Ruthine English, CRNP   sucralfate 1 g Oral 4x Daily (AC & HS) Ruthine English, CRNP   traZODone 50 mg Oral HS PRN Ruthine English, CRNP       Risks / Benefits of Treatment:    Risks, benefits, and possible side effects of medications explained to patient and patient verbalizes understanding and agreement for treatment  Risks of medications in pregnancy explained if female patient  Patient verbalizes understanding and agrees to notify her doctor if she becomes pregnant  Counseling / Coordination of Care:    Patient's progress discussed with staff in treatment team meeting  Medications, treatment progress and treatment plan reviewed with patient  Medication changes discussed with patient  Medication education provided to patient  Patient's progress reviewed with case management staff  Coping strategies reviewed with patient  Supportive counseling provided to the patient

## 2020-07-31 LAB
GLUCOSE SERPL-MCNC: 195 MG/DL (ref 65–140)
GLUCOSE SERPL-MCNC: 199 MG/DL (ref 65–140)
GLUCOSE SERPL-MCNC: 358 MG/DL (ref 65–140)
GLUCOSE SERPL-MCNC: 96 MG/DL (ref 65–140)

## 2020-07-31 PROCEDURE — 99232 SBSQ HOSP IP/OBS MODERATE 35: CPT | Performed by: NURSE PRACTITIONER

## 2020-07-31 PROCEDURE — 82948 REAGENT STRIP/BLOOD GLUCOSE: CPT

## 2020-07-31 RX ORDER — CLONAZEPAM 0.5 MG/1
0.5 TABLET ORAL ONCE
Status: COMPLETED | OUTPATIENT
Start: 2020-07-31 | End: 2020-07-31

## 2020-07-31 RX ORDER — TRAZODONE HYDROCHLORIDE 100 MG/1
100 TABLET ORAL
Status: DISCONTINUED | OUTPATIENT
Start: 2020-07-31 | End: 2020-08-03 | Stop reason: HOSPADM

## 2020-07-31 RX ORDER — CLONAZEPAM 0.5 MG/1
0.5 TABLET ORAL 2 TIMES DAILY
Status: DISCONTINUED | OUTPATIENT
Start: 2020-07-31 | End: 2020-07-31

## 2020-07-31 RX ORDER — CLONAZEPAM 0.5 MG/1
0.5 TABLET ORAL 2 TIMES DAILY
Status: DISCONTINUED | OUTPATIENT
Start: 2020-07-31 | End: 2020-08-03 | Stop reason: HOSPADM

## 2020-07-31 RX ADMIN — SUCRALFATE 1 G: 1 TABLET ORAL at 21:32

## 2020-07-31 RX ADMIN — BUSPIRONE HYDROCHLORIDE 20 MG: 10 TABLET ORAL at 21:33

## 2020-07-31 RX ADMIN — GABAPENTIN 800 MG: 400 CAPSULE ORAL at 21:33

## 2020-07-31 RX ADMIN — NICOTINE POLACRILEX 2 MG: 2 GUM, CHEWING BUCCAL at 12:59

## 2020-07-31 RX ADMIN — INSULIN LISPRO 2 UNITS: 100 INJECTION, SOLUTION INTRAVENOUS; SUBCUTANEOUS at 21:35

## 2020-07-31 RX ADMIN — GABAPENTIN 800 MG: 400 CAPSULE ORAL at 17:40

## 2020-07-31 RX ADMIN — SUCRALFATE 1 G: 1 TABLET ORAL at 16:00

## 2020-07-31 RX ADMIN — NICOTINE POLACRILEX 2 MG: 2 GUM, CHEWING BUCCAL at 21:52

## 2020-07-31 RX ADMIN — METOPROLOL TARTRATE 12.5 MG: 25 TABLET ORAL at 21:33

## 2020-07-31 RX ADMIN — NICOTINE POLACRILEX 2 MG: 2 GUM, CHEWING BUCCAL at 15:20

## 2020-07-31 RX ADMIN — INSULIN LISPRO 2 UNITS: 100 INJECTION, SOLUTION INTRAVENOUS; SUBCUTANEOUS at 08:59

## 2020-07-31 RX ADMIN — GLIPIZIDE 5 MG: 5 TABLET ORAL at 09:00

## 2020-07-31 RX ADMIN — PANTOPRAZOLE SODIUM 40 MG: 40 TABLET, DELAYED RELEASE ORAL at 09:00

## 2020-07-31 RX ADMIN — LITHIUM CARBONATE 300 MG: 300 CAPSULE, GELATIN COATED ORAL at 09:00

## 2020-07-31 RX ADMIN — SUCRALFATE 1 G: 1 TABLET ORAL at 08:28

## 2020-07-31 RX ADMIN — CLONAZEPAM 0.5 MG: 0.5 TABLET ORAL at 15:00

## 2020-07-31 RX ADMIN — CLONIDINE HYDROCHLORIDE 0.1 MG: 0.1 TABLET ORAL at 21:33

## 2020-07-31 RX ADMIN — FOLIC ACID 1 MG: 1 TABLET ORAL at 09:00

## 2020-07-31 RX ADMIN — BUSPIRONE HYDROCHLORIDE 20 MG: 10 TABLET ORAL at 09:01

## 2020-07-31 RX ADMIN — HYDROXYZINE HYDROCHLORIDE 75 MG: 50 TABLET, FILM COATED ORAL at 12:08

## 2020-07-31 RX ADMIN — INSULIN LISPRO 6 UNITS: 100 INJECTION, SOLUTION INTRAVENOUS; SUBCUTANEOUS at 12:05

## 2020-07-31 RX ADMIN — GABAPENTIN 800 MG: 400 CAPSULE ORAL at 09:01

## 2020-07-31 RX ADMIN — TRAZODONE HYDROCHLORIDE 100 MG: 100 TABLET ORAL at 21:35

## 2020-07-31 RX ADMIN — METOPROLOL TARTRATE 12.5 MG: 25 TABLET ORAL at 09:01

## 2020-07-31 RX ADMIN — NICOTINE 1 PATCH: 7 PATCH TRANSDERMAL at 08:58

## 2020-07-31 RX ADMIN — CLONAZEPAM 0.5 MG: 0.5 TABLET ORAL at 19:40

## 2020-07-31 RX ADMIN — SUCRALFATE 1 G: 1 TABLET ORAL at 11:26

## 2020-07-31 RX ADMIN — ACETAMINOPHEN 975 MG: 325 TABLET, FILM COATED ORAL at 07:51

## 2020-07-31 RX ADMIN — NALTREXONE HYDROCHLORIDE 50 MG: 50 TABLET, FILM COATED ORAL at 09:00

## 2020-07-31 RX ADMIN — LITHIUM CARBONATE 450 MG: 300 CAPSULE, GELATIN COATED ORAL at 21:31

## 2020-07-31 RX ADMIN — GABAPENTIN 800 MG: 400 CAPSULE ORAL at 12:05

## 2020-07-31 RX ADMIN — HYDROXYZINE HYDROCHLORIDE 75 MG: 50 TABLET, FILM COATED ORAL at 05:06

## 2020-07-31 RX ADMIN — BUSPIRONE HYDROCHLORIDE 20 MG: 10 TABLET ORAL at 16:00

## 2020-07-31 RX ADMIN — CLONIDINE HYDROCHLORIDE 0.1 MG: 0.1 TABLET ORAL at 09:00

## 2020-07-31 RX ADMIN — AMLODIPINE BESYLATE 5 MG: 5 TABLET ORAL at 09:00

## 2020-07-31 NOTE — CASE MANAGEMENT
CM received phone call from [facility] supervisor, Shmuel Lange stating that pt will be opened to their services within 2 weeks  CM advised Jessi Castano that the main concern is frequent readmissions and need for MAWD application to help with co-pays and community referrals  CM will continue to follow

## 2020-07-31 NOTE — PROGRESS NOTES
Pt remains pleasant and cooperative throughout the day  Denies SI-verbally contracts for safety  Reports having fluctuating anxiety, mostly focused on verifying her sons doctors appointment  Pt has been on the phone several times trying to contact her sons doctor  Denies any questions regarding her own treatment  No inappropriate behaviors  Reports sleeping well at night

## 2020-07-31 NOTE — CASE MANAGEMENT
CM outreached to Delilah at PCP office  CM scheduled pt 8/10/20 @ 3396  Cm added this appointment to pt's dc instructions

## 2020-07-31 NOTE — QUICK NOTE
Pt received atarax 75 mg for anxiety at 0506  Medication effective as pt currently in her room appears to be sleeping

## 2020-07-31 NOTE — PROGRESS NOTES
Progress Note - Behavioral Health   Rome Henriquez 48 y o  female MRN: [id number]  Unit/Bed#: [facility] 251-01 Encounter: [id number]    Assessment/Plan   Principal Problem:    Bipolar disorder ([facility] 75 )  Active Problems:    Essential hypertension    Acquired hypothyroidism    Post-traumatic stress disorder, chronic    Generalized anxiety disorder    Medical clearance for psychiatric admission    Type 2 diabetes mellitus without complication, without long-term current use of insulin ([facility] 75 )    Alcohol use      Subjective:Patient was seen today for continuation of care, records reviewed and  patient was discussed with the morning case review team  Margarette Mckeon is well known to this writer due to prior psych admissions  She remains calm, pleasant on approach, states she slept on and off due to RLS and anxiety last night, visible and social on the unit and attend groups  Margarette Mckeon talked about reason for her coming to the hospital due to increased anxiety but she "lied to the ED staff" because she knew that saying she had  anxiety wouldn't get her admitted and she needed her medications to be adjusted  She also talked about her son who was recently diagnosed with cancer which making her more anxious, states she wants to feel better that way she can be supportive to the son  States her son's doctor appointment is on 8/4 and she was hoping to be discharged prior to that date  Margarette Mckeon also reports that she was self medicating at home by taking her mother's Klonopin and sometimes buys Klonopin of the street,  She reports Klonopin was effective for her anxiety and was requesting a small dose  as needed medication at discharge  Margarette Mckeon requested to be taken off Naltrexone - states she was non compliant with this medication at home  Education given on the addictiveness of benzodiazepines    She currently rates her anxiety/depression at 4/10 with 10 being the worst    Margaretteherminio Mckeon denies endorsing any suicidal or homicidal ideation, denies hallucinations, denies delusions and denies paranoia  She does not seem to be experiencing any manic or psychotic symptoms during our encounter  She remains medication compliance and denies any side effects from medications  Will add Klonopin 0 5 mg/BID for anxiety and continue on other medications  Lithium level on 8/1  Vitals:  Vitals:    07/31/20 0718   BP: 159/96   Pulse: 88   Resp: 18   Temp: 98 2 °F (36 8 °C)   SpO2:        Laboratory results:    I have personally reviewed all pertinent laboratory/tests results  Most Recent Labs:   Lab Results   Component Value Date    WBC 11 75 (H) 07/28/2020    RBC 4 34 07/28/2020    HGB 13 7 07/28/2020    HCT 42 2 07/28/2020     (H) 07/28/2020    RDW 13 3 07/28/2020    NEUTROABS 8 41 (H) 07/28/2020    SODIUM 138 07/28/2020    K 3 8 07/28/2020     07/28/2020    CO2 26 07/28/2020    BUN 13 07/28/2020    CREATININE 0 89 07/28/2020    GLUC 204 (H) 07/28/2020    GLUF 119 (H) 12/18/2019    CALCIUM 9 0 07/28/2020    AST 16 07/28/2020    ALT 41 07/28/2020    ALKPHOS 109 07/28/2020    TP 7 3 07/28/2020    ALB 3 3 (L) 07/28/2020    TBILI 0 40 07/28/2020    CHOLESTEROL 237 (H) 07/28/2020    HDL 40 07/28/2020    TRIG 287 (H) 07/28/2020    LDLCALC 140 (H) 07/28/2020    NONHDLC 197 07/28/2020    LITHIUM 0 4 (L) 07/28/2020    AMMONIA <9 (L) 02/10/2020    QCH4MZDJSLKB 2 146 07/28/2020    FREET4 0 91 05/13/2020    PREGSERUM Negative 11/15/2019    RPR Non-Reactive 07/28/2020    HGBA1C 7 0 (H) 05/14/2020     05/14/2020       Psychiatric Review of Systems:    Behavior over the last 24 hours:  improved  Sleep: slept on and off  Appetite: normal  Medication side effects: No  ROS: no complaints, denies any shortness of breath or chest pain and all other systems are negative      Mental Status Evaluation:    Appearance:  casually dressed, dressed in hospital attire   Behavior:  pleasant, cooperative, calm   Speech:  normal rate and volume   Mood: improved, less anxious, less depressed   Affect:  blunted   Thought Process:  goal directed, linear   Thought Content:  no overt delusions, negative thinking, intrusive thoughts   Perceptual Disturbances: no auditory hallucinations, no visual hallucinations, denies when asked, does not appear responding to internal stimuli   Risk Potential: Suicidal ideation - None  Homicidal ideation - None  Potential for aggression - No   Memory:  recent and remote memory grossly intact   Consciousness:  alert and awake   Attention: attention span and concentration are age appropriate   Insight:  improving and partial   Judgment: improving and partial   Gait/Station: normal gait/station, normal balance   Motor Activity: no abnormal movements       Progress Toward Goals:     Slowly progressing    Assessment/Plan   Principal Problem:    Bipolar disorder ([facility] 75 )  Active Problems:    Essential hypertension    Acquired hypothyroidism    Post-traumatic stress disorder, chronic    Generalized anxiety disorder    Medical clearance for psychiatric admission    Type 2 diabetes mellitus without complication, without long-term current use of insulin ([place] [address] )    Alcohol use      Recommended Treatment: Treatment plan and medication changes discussed and per the attending physician the plan is: 1  Continue with group therapy, milieu therapy and occupational therapy  2  Behavioral Health checks every 7 minutes  3  Continue with current medication regimen  4  Will review labs in the a m   5  Disposition Planning:    Behavioral Health Medications: all current active meds have been reviewed and continue current psychiatric medications        Current Facility-Administered Medications:  acetaminophen 650 mg Oral Q6H PRN Chris Martine, CRNP   acetaminophen 650 mg Oral Q4H PRN Chris Martine, CRNP   acetaminophen 975 mg Oral Q6H PRN Chris Martine, CRNP   aluminum-magnesium hydroxide-simethicone 30 mL Oral Q4H PRN Chris Martine, CRNP   amLODIPine 5 mg Oral Daily [facility], CRNP   benztropine 1 mg Intramuscular Q6H PRN [facility], CRNP   benztropine 1 mg Oral Q6H PRN [facility], CRNP   busPIRone 20 mg Oral TID Leopold Carmel, DO   cloNIDine 0 1 mg Oral Q12H [facility], CRNP   folic acid 1 mg Oral Daily [facility], CRNP   gabapentin 800 mg Oral 4x Daily Leopold Crescent, DO   glipiZIDE 5 mg Oral Daily [facility], CRNP   haloperidol 5 mg Oral Q6H PRN [facility], CRNP   haloperidol lactate 5 mg Intramuscular Q6H PRN [facility], CRNP   hydrOXYzine HCL 25 mg Oral Q6H PRN [facility], CRNP   hydrOXYzine HCL 75 mg Oral Q6H PRN [facility], CRNP   insulin lispro 1-6 Units Subcutaneous TID AC Mena Bass PA-C   insulin lispro 1-6 Units Subcutaneous HS Jamie Johnston PA-C   lithium carbonate 300 mg Oral QAM [facility], CRNP   lithium carbonate 450 mg Oral HS [facility], CRNP   LORazepam 1 mg Intramuscular Q6H PRN [facility], CRNP   magnesium hydroxide 30 mL Oral Daily PRN [facility], CRNP   metoprolol tartrate 12 5 mg Oral Q12H [facility], CRNP   naltrexone 50 mg Oral Daily [facility], CRNP   nicotine 1 patch Transdermal Daily Marium Bryangine, DO   nicotine polacrilex 2 mg Oral Q2H PRN Marium Borreggine, DO   OLANZapine 5 mg Intramuscular Q3H PRN [facility], CRNP   OLANZapine 5 mg Oral Q3H PRN [facility], CRNP   pantoprazole 40 mg Oral Daily [facility], CRNP   risperiDONE 1 mg Oral Q6H PRN [facility], CRNP   sucralfate 1 g Oral 4x Daily (AC & HS) [facility], CRNP   traZODone 50 mg Oral HS PRN [facility], CRNP       Risks / Benefits of Treatment:     Risks, benefits, and possible side effects of medications explained to patient and patient verbalizes understanding and agreement for treatment  Counseling / Coordination of Care:     Patient's progress reviewed with nursing staff    Medications, treatment progress and treatment plan reviewed with patient  Supportive counseling provided to the patient            YONG Gamez

## 2020-07-31 NOTE — PROGRESS NOTES
07/31/20 0755   Team Meeting   Meeting Type Daily Rounds   Team Members Present   Team Members Present Physician;Nurse;;Occupational Therapist   Physician Team Member Kashmir Underwood ([facility])   Nursing Team Member [facility] Management Team Member Saskia/ [address]   OT Team Member [facility]   Patient/Family Present   Patient Present No   Patient's Family Present No     Pt took Atarax twice yesterday in the afternoon and evening  Again this morning at 5 am due to ongoing anxiety  Pt also took trazodone for sleep which was effective  Likely dc early next week  CM shared appointments with LENORA for Tuesday morning

## 2020-07-31 NOTE — CASE MANAGEMENT
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

## 2020-08-01 LAB
GLUCOSE SERPL-MCNC: 139 MG/DL (ref 65–140)
GLUCOSE SERPL-MCNC: 163 MG/DL (ref 65–140)
GLUCOSE SERPL-MCNC: 168 MG/DL (ref 65–140)
GLUCOSE SERPL-MCNC: 282 MG/DL (ref 65–140)
LITHIUM SERPL-SCNC: 0.5 MMOL/L (ref 0.5–1)

## 2020-08-01 PROCEDURE — 80178 ASSAY OF LITHIUM: CPT | Performed by: PSYCHIATRY & NEUROLOGY

## 2020-08-01 PROCEDURE — 99232 SBSQ HOSP IP/OBS MODERATE 35: CPT | Performed by: PSYCHIATRY & NEUROLOGY

## 2020-08-01 PROCEDURE — 82948 REAGENT STRIP/BLOOD GLUCOSE: CPT

## 2020-08-01 RX ADMIN — CLONIDINE HYDROCHLORIDE 0.1 MG: 0.1 TABLET ORAL at 09:02

## 2020-08-01 RX ADMIN — INSULIN LISPRO 1 UNITS: 100 INJECTION, SOLUTION INTRAVENOUS; SUBCUTANEOUS at 09:07

## 2020-08-01 RX ADMIN — FOLIC ACID 1 MG: 1 TABLET ORAL at 09:01

## 2020-08-01 RX ADMIN — NICOTINE POLACRILEX 2 MG: 2 GUM, CHEWING BUCCAL at 15:24

## 2020-08-01 RX ADMIN — INSULIN LISPRO 4 UNITS: 100 INJECTION, SOLUTION INTRAVENOUS; SUBCUTANEOUS at 23:42

## 2020-08-01 RX ADMIN — NICOTINE 1 PATCH: 7 PATCH TRANSDERMAL at 09:09

## 2020-08-01 RX ADMIN — LITHIUM CARBONATE 450 MG: 300 CAPSULE, GELATIN COATED ORAL at 21:28

## 2020-08-01 RX ADMIN — METOPROLOL TARTRATE 12.5 MG: 25 TABLET ORAL at 09:03

## 2020-08-01 RX ADMIN — BUSPIRONE HYDROCHLORIDE 20 MG: 10 TABLET ORAL at 17:08

## 2020-08-01 RX ADMIN — CLONIDINE HYDROCHLORIDE 0.1 MG: 0.1 TABLET ORAL at 21:28

## 2020-08-01 RX ADMIN — CLONAZEPAM 0.5 MG: 0.5 TABLET ORAL at 20:01

## 2020-08-01 RX ADMIN — SUCRALFATE 1 G: 1 TABLET ORAL at 09:01

## 2020-08-01 RX ADMIN — LITHIUM CARBONATE 300 MG: 300 CAPSULE, GELATIN COATED ORAL at 10:42

## 2020-08-01 RX ADMIN — METOPROLOL TARTRATE 12.5 MG: 25 TABLET ORAL at 21:28

## 2020-08-01 RX ADMIN — GABAPENTIN 800 MG: 400 CAPSULE ORAL at 09:02

## 2020-08-01 RX ADMIN — SUCRALFATE 1 G: 1 TABLET ORAL at 16:12

## 2020-08-01 RX ADMIN — SUCRALFATE 1 G: 1 TABLET ORAL at 21:28

## 2020-08-01 RX ADMIN — INSULIN LISPRO 1 UNITS: 100 INJECTION, SOLUTION INTRAVENOUS; SUBCUTANEOUS at 11:37

## 2020-08-01 RX ADMIN — GLIPIZIDE 5 MG: 5 TABLET ORAL at 09:02

## 2020-08-01 RX ADMIN — GABAPENTIN 800 MG: 400 CAPSULE ORAL at 21:28

## 2020-08-01 RX ADMIN — SUCRALFATE 1 G: 1 TABLET ORAL at 11:36

## 2020-08-01 RX ADMIN — GABAPENTIN 800 MG: 400 CAPSULE ORAL at 13:45

## 2020-08-01 RX ADMIN — AMLODIPINE BESYLATE 5 MG: 5 TABLET ORAL at 09:01

## 2020-08-01 RX ADMIN — BUSPIRONE HYDROCHLORIDE 20 MG: 10 TABLET ORAL at 09:02

## 2020-08-01 RX ADMIN — BUSPIRONE HYDROCHLORIDE 20 MG: 10 TABLET ORAL at 21:28

## 2020-08-01 RX ADMIN — CLONAZEPAM 0.5 MG: 0.5 TABLET ORAL at 09:02

## 2020-08-01 RX ADMIN — PANTOPRAZOLE SODIUM 40 MG: 40 TABLET, DELAYED RELEASE ORAL at 09:02

## 2020-08-01 RX ADMIN — GABAPENTIN 800 MG: 400 CAPSULE ORAL at 17:08

## 2020-08-01 NOTE — PROGRESS NOTES
Progress Note - Behavioral Health   Hillary Reza 48 y o  female MRN: [id number]  Unit/Bed#: [facility] 251-01 Encounter: [id number]    The patient was seen for continuing care and reviewed with treatment team     Current Mental Status Evaluation:  Appearance:  Adequate hygiene and grooming   Behavior:  cooperative   Mood:  anxious   Affect: mood-congruent   Speech: Normal rate and Normal volume   Thought Process:  Goal directed and coherent   Thought Content:  Does not verbalize delusional material   Perceptual Disturbances: Denies hallucinations and does not appear to be responding to internal stimuli   Risk Potential: No suicidal or homicidal ideation   Orientation:   Person, place, time     Progress Toward Goals: She required atarax PRN yesterday and felt it helped her  Working on problem solving who besides herself would accompany her son to his oncology appointment so she can have some support  Looking for DC Monday  Awaiting new lithium level today  Said she only said she was suicidal so she could get immediate help  Lab Results   Component Value Date    LITHIUM 0 4 (L) 07/28/2020     01/03/2016    BUN 13 07/28/2020    CREATININE 0 89 07/28/2020    WBC 11 75 (H) 07/28/2020       Principal Problem:    Bipolar disorder ([facility] )  Active Problems:    Essential hypertension    Acquired hypothyroidism    Post-traumatic stress disorder, chronic    Generalized anxiety disorder    Medical clearance for psychiatric admission    Type 2 diabetes mellitus without complication, without long-term current use of insulin ([facility] )    Alcohol use      Recommended Treatment: Continue with pharmacotherapy, group therapy, milieu therapy and occupational therapy    The patient will be maintained on the following medications:    Current Facility-Administered Medications:  acetaminophen 650 mg Oral Q6H PRN Ilean Terell, CRNP   acetaminophen 650 mg Oral Q4H PRN Ilean Terell, CRNP   acetaminophen 975 mg Oral Q6H PRN Ilean Terell, CRNP   aluminum-magnesium hydroxide-simethicone 30 mL Oral Q4H PRN Minda Sheridan, CRNP   amLODIPine 5 mg Oral Daily Minda Sheridan, CRNP   benztropine 1 mg Intramuscular Q6H PRN Minda Sheridan, CRNP   benztropine 1 mg Oral Q6H PRN Minda Sheridan, CRNP   busPIRone 20 mg Oral TID Lorelei Leonard, DO   clonazePAM 0 5 mg Oral BID Nicholas Santillan, CRNP   cloNIDine 0 1 mg Oral Q12H [address] Minda Sheridan, CRNP   folic acid 1 mg Oral Daily Minda Sheridan, CRNP   gabapentin 800 mg Oral 4x Daily Lorelei Leonard, DO   glipiZIDE 5 mg Oral Daily Minda Sheridan, CRNP   haloperidol 5 mg Oral Q6H PRN Minda Sheridan, CRNP   haloperidol lactate 5 mg Intramuscular Q6H PRN Minda Sheridan, CRNP   hydrOXYzine HCL 25 mg Oral Q6H PRN Minda Sheridan, CRNP   hydrOXYzine HCL 75 mg Oral Q6H PRN Minda Sheridan, CRNP   insulin lispro 1-6 Units Subcutaneous TID AC Mena Bass PA-C   insulin lispro 1-6 Units Subcutaneous HS Mariluz Alvarenga PA-C   lithium carbonate 300 mg Oral QAM Minda Sheridan, CRNP   lithium carbonate 450 mg Oral HS Minda Sheridan, CRNP   LORazepam 1 mg Intramuscular Q6H PRN Minda Sheridan, CRNP   magnesium hydroxide 30 mL Oral Daily PRN Minda Sheridan, CRNP   metoprolol tartrate 12 5 mg Oral Q12H [address] Minda Sheridan, CRNP   nicotine 1 patch Transdermal Daily Mariumadam Teranreggine, DO   nicotine polacrilex 2 mg Oral Q2H PRN Marium Borreggine, DO   OLANZapine 5 mg Intramuscular Q3H PRN Minda Sheridan, CRNP   OLANZapine 5 mg Oral Q3H PRN Minda Sheridan, CRNP   pantoprazole 40 mg Oral Daily Minda Sheridan, CRNP   risperiDONE 1 mg Oral Q6H PRN Minda Sheridan, CRNP   sucralfate 1 g Oral 4x Daily (AC & HS) Minda Sheridan, CRNP   traZODone 100 mg Oral HS PRN Nicholas Santillan, CRNP

## 2020-08-01 NOTE — PROGRESS NOTES
Pt reports feeling depressed and restless, denies SI  Pt reports she hears others laughing but she can't laugh like that  Pt discussed history of self-medicating with alcohol, reports she now socially drinks with a friend, reports feeling alone at home but that her middle son is going to come live with her  Pt reports feeling like her family does not really understand mental health issues and try to find simple explanations and solutions  Pt reports feeling bored but also having trouble focusing, plans to walk the halls  Pt reports difficulty sleeping at night due to her roommate's nightmares  Pleasant in conversation, out in milieu, cooperative

## 2020-08-01 NOTE — PROGRESS NOTES
Denies SI/HI, reports some anxiety however feels it is more manageable than previous days  Sleeping at times throughout the morning however does state she slept well  Overnight  Denies any questions regarding treatment at this time

## 2020-08-01 NOTE — TREATMENT TEAM
AM rounds- denies SI, struggling with anxiety  Walking halls and prn medications helpful  Sugars elevated-pt eating fruit before check and was educated on this  Slept well overnight

## 2020-08-01 NOTE — PROGRESS NOTES
Pt   Is  Very  Pleasant/co-operative  Thru out  Evening  Denies SI/HI  Pt  States anxiety continues but is decreasing  Pt   States  Started out poorly but  Toward  Evening  Started  To improve  Pt   Given  Desyrel  100  Mg  Po   For  Sleep  At 2135  Med  Effective  Pt   Appears  To  Be sleeping

## 2020-08-02 LAB
GLUCOSE SERPL-MCNC: 154 MG/DL (ref 65–140)
GLUCOSE SERPL-MCNC: 179 MG/DL (ref 65–140)
GLUCOSE SERPL-MCNC: 185 MG/DL (ref 65–140)
GLUCOSE SERPL-MCNC: 227 MG/DL (ref 65–140)

## 2020-08-02 PROCEDURE — 82948 REAGENT STRIP/BLOOD GLUCOSE: CPT

## 2020-08-02 PROCEDURE — 99232 SBSQ HOSP IP/OBS MODERATE 35: CPT | Performed by: PSYCHIATRY & NEUROLOGY

## 2020-08-02 RX ORDER — LOPERAMIDE HCL 1 MG/7.5ML
2 SUSPENSION ORAL 3 TIMES DAILY PRN
Status: DISCONTINUED | OUTPATIENT
Start: 2020-08-02 | End: 2020-08-03 | Stop reason: HOSPADM

## 2020-08-02 RX ADMIN — INSULIN LISPRO 1 UNITS: 100 INJECTION, SOLUTION INTRAVENOUS; SUBCUTANEOUS at 08:59

## 2020-08-02 RX ADMIN — METOPROLOL TARTRATE 12.5 MG: 25 TABLET ORAL at 21:40

## 2020-08-02 RX ADMIN — GABAPENTIN 800 MG: 400 CAPSULE ORAL at 21:40

## 2020-08-02 RX ADMIN — NICOTINE POLACRILEX 2 MG: 2 GUM, CHEWING BUCCAL at 16:58

## 2020-08-02 RX ADMIN — BUSPIRONE HYDROCHLORIDE 20 MG: 10 TABLET ORAL at 21:39

## 2020-08-02 RX ADMIN — GABAPENTIN 800 MG: 400 CAPSULE ORAL at 09:00

## 2020-08-02 RX ADMIN — LITHIUM CARBONATE 300 MG: 300 CAPSULE, GELATIN COATED ORAL at 09:00

## 2020-08-02 RX ADMIN — LITHIUM CARBONATE 450 MG: 300 CAPSULE, GELATIN COATED ORAL at 21:40

## 2020-08-02 RX ADMIN — INSULIN LISPRO 2 UNITS: 100 INJECTION, SOLUTION INTRAVENOUS; SUBCUTANEOUS at 21:44

## 2020-08-02 RX ADMIN — FOLIC ACID 1 MG: 1 TABLET ORAL at 09:00

## 2020-08-02 RX ADMIN — SUCRALFATE 1 G: 1 TABLET ORAL at 21:39

## 2020-08-02 RX ADMIN — INSULIN LISPRO 1 UNITS: 100 INJECTION, SOLUTION INTRAVENOUS; SUBCUTANEOUS at 12:09

## 2020-08-02 RX ADMIN — CLONIDINE HYDROCHLORIDE 0.1 MG: 0.1 TABLET ORAL at 09:00

## 2020-08-02 RX ADMIN — SUCRALFATE 1 G: 1 TABLET ORAL at 12:09

## 2020-08-02 RX ADMIN — BUSPIRONE HYDROCHLORIDE 20 MG: 10 TABLET ORAL at 09:00

## 2020-08-02 RX ADMIN — METOPROLOL TARTRATE 12.5 MG: 25 TABLET ORAL at 09:00

## 2020-08-02 RX ADMIN — GLIPIZIDE 5 MG: 5 TABLET ORAL at 09:00

## 2020-08-02 RX ADMIN — SUCRALFATE 1 G: 1 TABLET ORAL at 16:19

## 2020-08-02 RX ADMIN — GABAPENTIN 800 MG: 400 CAPSULE ORAL at 17:00

## 2020-08-02 RX ADMIN — CLONAZEPAM 0.5 MG: 0.5 TABLET ORAL at 09:02

## 2020-08-02 RX ADMIN — CLONIDINE HYDROCHLORIDE 0.1 MG: 0.1 TABLET ORAL at 21:40

## 2020-08-02 RX ADMIN — CLONAZEPAM 0.5 MG: 0.5 TABLET ORAL at 21:36

## 2020-08-02 RX ADMIN — AMLODIPINE BESYLATE 5 MG: 5 TABLET ORAL at 09:00

## 2020-08-02 RX ADMIN — SUCRALFATE 1 G: 1 TABLET ORAL at 06:24

## 2020-08-02 RX ADMIN — INSULIN LISPRO 1 UNITS: 100 INJECTION, SOLUTION INTRAVENOUS; SUBCUTANEOUS at 16:57

## 2020-08-02 RX ADMIN — BUSPIRONE HYDROCHLORIDE 20 MG: 10 TABLET ORAL at 16:19

## 2020-08-02 RX ADMIN — NICOTINE 1 PATCH: 7 PATCH TRANSDERMAL at 09:03

## 2020-08-02 RX ADMIN — PANTOPRAZOLE SODIUM 40 MG: 40 TABLET, DELAYED RELEASE ORAL at 09:00

## 2020-08-02 RX ADMIN — GABAPENTIN 800 MG: 400 CAPSULE ORAL at 12:09

## 2020-08-02 NOTE — PROGRESS NOTES
Pt remains seclusive to her room throughout the day, reporting one episode of loose stool  Pt denies SI/HI, reports anxiety has significantly decreased since admission and is beginning to feel ready to discharge  Pt feels relief after speaking to the doctor regarding what medications she will be discharged on  Denies any questions at this time  Sleeping at times

## 2020-08-02 NOTE — PROGRESS NOTES
Progress Note - Behavioral Health   Cecy Leader 48 y o  female MRN: [id number]  Unit/Bed#: [facility] 251-01 Encounter: [id number]    The patient was seen for continuing care and reviewed with treatment team     Current Mental Status Evaluation:  Appearance:  Adequate hygiene and grooming   Behavior:  cooperative   Mood:  anxious   Affect: mood-congruent   Speech: Normal rate and Normal volume   Thought Process:  Goal directed and coherent   Thought Content:  Does not verbalize delusional material   Perceptual Disturbances: Denies hallucinations and does not appear to be responding to internal stimuli   Risk Potential: No suicidal or homicidal ideation   Orientation:   Person, place, situation     Progress Toward Goals: Patient seen in room, discusses her anxiety and how she will use different coping skills (self talk, walking) and wants to learn from others how they manage their anxiety  Asks if she will be discharged on all the same medications including her klonopin and lithium 0 5  Principal Problem:    Bipolar disorder ([facility] 75 )  Active Problems:    Essential hypertension    Acquired hypothyroidism    Post-traumatic stress disorder, chronic    Generalized anxiety disorder    Medical clearance for psychiatric admission    Type 2 diabetes mellitus without complication, without long-term current use of insulin ([facility] 75 )    Alcohol use      Recommended Treatment: Continue with pharmacotherapy, group therapy, milieu therapy and occupational therapy    The patient will be maintained on the following medications:  acetaminophen, 650 mg, Oral, Q6H PRN, Chris Martine, CRNP  acetaminophen, 650 mg, Oral, Q4H PRN, Chris Martine, CRNP  acetaminophen, 975 mg, Oral, Q6H PRN, Chris Martine, CRNP  aluminum-magnesium hydroxide-simethicone, 30 mL, Oral, Q4H PRN, Chris Martine, CRNP  amLODIPine, 5 mg, Oral, Daily, Chris Martine, CRNP  benztropine, 1 mg, Intramuscular, Q6H PRN, Chris Martine, CRNP  benztropine, 1 mg, Oral, Q6H PRN, Kyleine Oar, CRNP  busPIRone, 20 mg, Oral, TID, Wilma Valles,   clonazePAM, 0 5 mg, Oral, BID, YONG Mccray  cloNIDine, 0 1 mg, Oral, Q12H [address], Victorine Oar, CRNP  folic acid, 1 mg, Oral, Daily, Candy Oar, CRNP  gabapentin, 800 mg, Oral, 4x Daily, Wilma Valles,   glipiZIDE, 5 mg, Oral, Daily, Kyleine Oar, CRNP  haloperidol, 5 mg, Oral, Q6H PRN, Kyleine Oar, CRNP  haloperidol lactate, 5 mg, Intramuscular, Q6H PRN, Kyleine Oar, CRNP  hydrOXYzine HCL, 25 mg, Oral, Q6H PRN, Kyleine Oar, CRNP  hydrOXYzine HCL, 75 mg, Oral, Q6H PRN, Kyleine Oar, CRNP  insulin lispro, 1-6 Units, Subcutaneous, TID AC, [place] ARACELIS Bass  insulin lispro, 1-6 Units, Subcutaneous, HS, [place] ARACELIS Bass  lithium carbonate, 300 mg, Oral, QAM, Candy Oar, CRSLOAN  lithium carbonate, 450 mg, Oral, HS, YONG Burns  LORazepam, 1 mg, Intramuscular, Q6H PRN, Candy Oar, CRNP  magnesium hydroxide, 30 mL, Oral, Daily PRN, Candy Oar, CRNP  metoprolol tartrate, 12 5 mg, Oral, Q12H [address], Candy Oar, CRNP  nicotine, 1 patch, Transdermal, Daily, Marium Bryangine, DO  nicotine polacrilex, 2 mg, Oral, Q2H PRN, Marium Borreggine, DO  OLANZapine, 5 mg, Intramuscular, Q3H PRN, Kyleine Oar, CRNP  OLANZapine, 5 mg, Oral, Q3H PRN, Kyleine Oar, CRNP  pantoprazole, 40 mg, Oral, Daily, Candy Oar, CRNP  risperiDONE, 1 mg, Oral, Q6H PRN, YONG Liang  sucralfate, 1 g, Oral, 4x Daily (AC & HS), YONG Brady  traZODone, 100 mg, Oral, HS PRN, YONG Mccray

## 2020-08-03 VITALS
RESPIRATION RATE: 17 BRPM | HEIGHT: 63 IN | BODY MASS INDEX: 36.14 KG/M2 | HEART RATE: 89 BPM | DIASTOLIC BLOOD PRESSURE: 73 MMHG | WEIGHT: 204 LBS | SYSTOLIC BLOOD PRESSURE: 149 MMHG | TEMPERATURE: 98.6 F | OXYGEN SATURATION: 98 %

## 2020-08-03 LAB
GLUCOSE SERPL-MCNC: 177 MG/DL (ref 65–140)
GLUCOSE SERPL-MCNC: 190 MG/DL (ref 65–140)

## 2020-08-03 PROCEDURE — 82948 REAGENT STRIP/BLOOD GLUCOSE: CPT

## 2020-08-03 PROCEDURE — 99239 HOSP IP/OBS DSCHRG MGMT >30: CPT | Performed by: PHYSICIAN ASSISTANT

## 2020-08-03 RX ORDER — LITHIUM CARBONATE 300 MG/1
300 CAPSULE ORAL EVERY MORNING
Qty: 30 CAPSULE | Refills: 1 | Status: ON HOLD | OUTPATIENT
Start: 2020-08-04 | End: 2020-08-28 | Stop reason: SDUPTHER

## 2020-08-03 RX ORDER — GLIPIZIDE 5 MG/1
5 TABLET ORAL DAILY
Qty: 30 TABLET | Refills: 0 | Status: SHIPPED | OUTPATIENT
Start: 2020-08-04 | End: 2020-08-28 | Stop reason: HOSPADM

## 2020-08-03 RX ORDER — CLONAZEPAM 0.5 MG/1
0.5 TABLET ORAL 2 TIMES DAILY
Qty: 60 TABLET | Refills: 1 | Status: SHIPPED | OUTPATIENT
Start: 2020-08-03 | End: 2020-08-28 | Stop reason: HOSPADM

## 2020-08-03 RX ORDER — TRAZODONE HYDROCHLORIDE 100 MG/1
100 TABLET ORAL
Qty: 30 TABLET | Refills: 1 | Status: ON HOLD | OUTPATIENT
Start: 2020-08-03 | End: 2020-08-28 | Stop reason: SDUPTHER

## 2020-08-03 RX ORDER — CLONIDINE HYDROCHLORIDE 0.1 MG/1
0.1 TABLET ORAL EVERY 12 HOURS SCHEDULED
Qty: 60 TABLET | Refills: 0 | Status: SHIPPED | OUTPATIENT
Start: 2020-08-03 | End: 2020-08-28 | Stop reason: HOSPADM

## 2020-08-03 RX ORDER — BUSPIRONE HYDROCHLORIDE 10 MG/1
20 TABLET ORAL 3 TIMES DAILY
Qty: 180 TABLET | Refills: 1 | Status: ON HOLD | OUTPATIENT
Start: 2020-08-03 | End: 2020-08-28 | Stop reason: SDUPTHER

## 2020-08-03 RX ORDER — LITHIUM CARBONATE 150 MG/1
450 CAPSULE ORAL
Qty: 30 CAPSULE | Refills: 1 | Status: ON HOLD | OUTPATIENT
Start: 2020-08-03 | End: 2020-08-28 | Stop reason: SDUPTHER

## 2020-08-03 RX ORDER — GABAPENTIN 400 MG/1
800 CAPSULE ORAL 4 TIMES DAILY
Qty: 240 CAPSULE | Refills: 1 | Status: ON HOLD | OUTPATIENT
Start: 2020-08-03 | End: 2020-08-28 | Stop reason: SDUPTHER

## 2020-08-03 RX ADMIN — CLONAZEPAM 0.5 MG: 0.5 TABLET ORAL at 08:27

## 2020-08-03 RX ADMIN — METOPROLOL TARTRATE 12.5 MG: 25 TABLET ORAL at 08:28

## 2020-08-03 RX ADMIN — INSULIN LISPRO 2 UNITS: 100 INJECTION, SOLUTION INTRAVENOUS; SUBCUTANEOUS at 08:32

## 2020-08-03 RX ADMIN — SUCRALFATE 1 G: 1 TABLET ORAL at 06:02

## 2020-08-03 RX ADMIN — SUCRALFATE 1 G: 1 TABLET ORAL at 11:23

## 2020-08-03 RX ADMIN — GABAPENTIN 800 MG: 400 CAPSULE ORAL at 11:25

## 2020-08-03 RX ADMIN — NICOTINE 1 PATCH: 7 PATCH TRANSDERMAL at 08:34

## 2020-08-03 RX ADMIN — LITHIUM CARBONATE 300 MG: 300 CAPSULE, GELATIN COATED ORAL at 08:27

## 2020-08-03 RX ADMIN — NICOTINE POLACRILEX 2 MG: 2 GUM, CHEWING BUCCAL at 11:27

## 2020-08-03 RX ADMIN — BUSPIRONE HYDROCHLORIDE 20 MG: 10 TABLET ORAL at 08:29

## 2020-08-03 RX ADMIN — PANTOPRAZOLE SODIUM 40 MG: 40 TABLET, DELAYED RELEASE ORAL at 08:28

## 2020-08-03 RX ADMIN — AMLODIPINE BESYLATE 5 MG: 5 TABLET ORAL at 08:27

## 2020-08-03 RX ADMIN — INSULIN LISPRO 1 UNITS: 100 INJECTION, SOLUTION INTRAVENOUS; SUBCUTANEOUS at 11:27

## 2020-08-03 RX ADMIN — GABAPENTIN 800 MG: 400 CAPSULE ORAL at 08:27

## 2020-08-03 RX ADMIN — GLIPIZIDE 5 MG: 5 TABLET ORAL at 08:27

## 2020-08-03 RX ADMIN — CLONIDINE HYDROCHLORIDE 0.1 MG: 0.1 TABLET ORAL at 08:27

## 2020-08-03 RX ADMIN — FOLIC ACID 1 MG: 1 TABLET ORAL at 08:27

## 2020-08-03 NOTE — DISCHARGE INSTRUCTIONS
Anxiety   WHAT YOU SHOULD KNOW:   Anxiety is a condition that causes you to feel excessive worry, uneasiness, or fear  Family or work stress, smoking, caffeine, and alcohol can increase your risk for anxiety  Certain medicines or health conditions can also increase your risk  Anxiety may begin gradually, and can become a long-term condition if it is not managed or treated  AFTER YOU LEAVE:   Medicines:   · Medicines  can help you feel more calm and relaxed, and decrease your symptoms  · Take your medicine as directed  Contact your healthcare provider if you think your medicine is not helping or if you have side effects  Tell him if you are allergic to any medicine  Keep a list of the medicines, vitamins, and herbs you take  Include the amounts, and when and why you take them  Bring the list or the pill bottles to follow-up visits  Carry your medicine list with you in case of an emergency  Follow up with your healthcare provider within 2 weeks or as directed:  Write down your questions so you remember to ask them during your visits  Manage anxiety:   · Go to counseling as directed  Cognitive behavioral therapy can help you understand and change how you react to events that trigger your symptoms  · Find ways to manage your symptoms  Activities such as exercise, meditation, or listening to music can help you relax  · Practice deep breathing  Breathing can change how your body reacts to stress  Focus on taking slow, deep breaths several times a day, or during an anxiety attack  Breathe in through your nose, and out through your mouth  · Avoid caffeine  Caffeine can make your symptoms worse  Avoid foods or drinks that are meant to increase your energy level  · Limit or avoid alcohol  Ask your healthcare provider if alcohol is safe for you  You may not be able to drink alcohol if you take certain anxiety or depression medicines  Limit alcohol to 1 drink per day if you are a woman   Limit alcohol to 2 drinks per day if you are a man  A drink of alcohol is 12 ounces of beer, 5 ounces of wine, or 1½ ounces of liquor  Contact your healthcare provider if:   · Your symptoms get worse or do not get better with treatment  · You think your medicine may be causing side effects  · Your anxiety keeps you from doing your regular daily activities  · You have new symptoms since your last visit  · You have questions or concerns about your condition or care  Seek care immediately or call 911 if:   · You have chest pain, tightness, or heaviness that may spread to your shoulders, arms, jaw, neck, or back  · You feel like hurting yourself or someone else  · You feel dizzy, lightheaded, or faint  © 2014 [address] [employer] is for End User's use only and may not be sold, redistributed or otherwise used for commercial purposes  All illustrations and images included in CareNotes® are the copyrighted property of A D [employer] M [employer]  or Dennis Hobson  The above information is an  only  It is not intended as medical advice for individual conditions or treatments  Talk to your doctor, nurse or pharmacist before following any medical regimen to see if it is safe and effective for you  Bipolar Disorder   WHAT YOU NEED TO KNOW:   Bipolar disorder is a long-term chemical imbalance that causes rapid changes in mood and behavior  High moods are called doc  Low moods are called depression  Sometimes you will feel manic and sometimes you will feel depressed  You can have alternating episodes of doc and depression  This is called a mixed bipolar state  DISCHARGE INSTRUCTIONS:   Call 911 if:   · You think about hurting yourself or someone else  Contact your healthcare provider or psychiatrist if:   · You are having trouble managing your bipolar disorder  · You cannot sleep, or are sleeping all the time       · You cannot eat, or are eating more than usual     · You feel dizzy or your stomach is upset  · You cannot make it to your next meeting  · You have questions or concerns about your condition or care  Medicines:   · Medicines  may be given to help keep your mood stable, or to help you sleep  Changes in medicine are often needed as your bipolar disorder changes  · Take your medicine as directed  Contact your healthcare provider if you think your medicine is not helping or if you have side effects  Tell him or her if you are allergic to any medicine  Keep a list of the medicines, vitamins, and herbs you take  Include the amounts, and when and why you take them  Bring the list or the pill bottles to follow-up visits  Carry your medicine list with you in case of an emergency  Follow up with your healthcare provider or psychiatrist as directed:  Write down your questions so you remember to ask them during your visits  Manage bipolar disorder:  Watch for triggers of bipolar disorder symptoms, such as stress  Learn new ways to relax, such as deep breathing, to manage your stress  Tell someone if you feel a manic or depressive period might be coming on  Ask a friend or family member to help watch you for bipolar symptoms  Work to develop skills that will help you manage bipolar disorder  You may need to make lifestyle changes  Ask your healthcare provider or psychiatrist for resources  For support and more information:   · [address] [facility], [address] [facility], [address] [facility], [address]  [place] [id number]   Phone: [phone]  Phone: [phone]  Web Address: [facility] [place]  · Depression and [address] ([facility])  [address] [place] [address], [address] [place] [address] , [address]  Phone: [phone]  Web Address: [employer]  org   © 2017 [address] [facility] Information is for End User's use only and may not be sold, redistributed or otherwise used for commercial purposes  All illustrations and images included in CareNotes® are the copyrighted property of A [employer] M [employer]  or Dennis Hobson  The above information is an  only  It is not intended as medical advice for individual conditions or treatments  Talk to your doctor, nurse or pharmacist before following any medical regimen to see if it is safe and effective for you
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
## 2020-08-03 NOTE — PLAN OF CARE
Problem: SELF HARM/SUICIDALITY  Goal: Will have no self-injury during hospital stay  Description: INTERVENTIONS:  - Q 15 MINUTES: Routine safety checks  - Q WAKING SHIFT & PRN: Assess risk to determine if routine checks are adequate to maintain patient safety  - Encourage patient to participate actively in care by formulating a plan to combat response to suicidal ideation, identify supports and resources  Outcome: Adequate for Discharge     Problem: DEPRESSION  Goal: Will be euthymic at discharge  Description: INTERVENTIONS:  - Administer medication as ordered  - Provide emotional support via 1:1 interaction with staff  - Encourage involvement in milieu/groups/activities  - Monitor for social isolation  Outcome: Adequate for Discharge     Problem: ANXIETY  Goal: Will report anxiety at manageable levels  Description: INTERVENTIONS:  - Administer medication as ordered  - Teach and encourage coping skills  - Provide emotional support  - Assess patient/family for anxiety and ability to cope  Outcome: Adequate for Discharge  Goal: By discharge: Patient will verbalize 2 strategies to deal with anxiety  Description: Interventions:  - Identify any obvious source/trigger to anxiety  - Staff will assist patient in applying identified coping technique/skills  - Encourage attendance of scheduled groups and activities  Outcome: Adequate for Discharge     Problem: DISCHARGE PLANNING  Goal: Discharge to home or other facility with appropriate resources  Description: INTERVENTIONS:  - Identify barriers to discharge w/patient and caregiver  - Arrange for needed discharge resources and transportation as appropriate  - Identify discharge learning needs (meds, wound care, etc )  - Refer to Case Management Department for coordinating discharge planning if the patient needs post-hospital services based on physician/advanced practitioner order or complex needs related to functional status, cognitive ability, or social support system Outcome: Adequate for Discharge     Problem: Ineffective Coping  Goal: Participates in unit activities  Description: Interventions:  - Provide therapeutic environment   - Provide required programming   - Redirect inappropriate behaviors   Outcome: Adequate for Discharge

## 2020-08-03 NOTE — DISCHARGE INSTR - OTHER ORDERS
You are being discharged to [address] [place] [address]  [facility] Crisis Hotline: 310.480.9178  *National Suicide Prevention Lifeline:  [address] , [address] on Mental Illness ([place]) HELPLINE: 857.451.5729/Website: www [employer]abella [employer]  *Substance Abuse and Mental Health Services Administration([facility]) [employer], which is a confidential, free, 24-hour-a-day, 365-day-a-year, information service for individuals and family members facing mental health and/or substance use disorders  This service provides referrals to local treatment facilities, support groups, and community-based organizations  Callers can also order free publications and other information  Call 3-845.888.5953/Website: www [facility] gov  *[facility] 2-1-1: This is a toll free, confidential, 24-hour-a-day service which connects you to a community  in your area who can help you find services and resources that are available to you locally and provide critical services that can improve and save lives  Call: 80  /Website: https://[employer]/      What you need to know aboutcoronavirus disease 2019 (COVID-19)     What is coronavirus disease 2019 (COVID-19)? Coronavirus disease 2019 (COVID-19) is a respiratory illness that can spread from person to person  The virus that causes COVID-19 is a novel coronavirus that was first identified during an investigation into an outbreak in [place], [place]  Can people in [place]  get COVID-19? Yes  COVID-19 is spreading from person to person in parts of the [place]  Risk of infection with COVID-19 is higher for people who are close contacts of someone known to have COVID-19, for example healthcare workers, or household members  Other people at higher risk for infection are those who live in or have recently been in an area with ongoing spread of COVID-19   Learn more about places with ongoing spread at [facility]  html#geographic  Have there been cases of COVID-19 in the [place] ?   Yes  The first case of COVID-19 in the [place] was reported on January 21, 2020  The current count of cases of COVID-19 in the [place] is available on [employer] at [facility]  How does COVID-19 spread? The virus that causes COVID-19 probably emerged from an animal source, but is now spreading from person to person  The virus is thought to spread mainly between people who are in close contact with one another (within about 6 feet) through respiratory droplets produced when an infected person coughs or sneezes  It also may be possible that a person can get COVID-19 by touching a surface or object that has the virus on it and then touching their own mouth, nose, or possibly their eyes, but this is not thought to be the main way the virus spreads  Learn what is known about the spread of newly emerged coronaviruses at [facility]  What are the symptoms of COVID-19? Patients with COVID-19 have had mild to severe respiratory illness with symptoms of   fever   cough   shortness of breath    What are severe complications from this virus? Some patients have pneumonia in both lungs, multi-organ failure and in some cases death  How can I help protect myself? People can help protect themselves from respiratory illness with everyday preventive actions  Avoid close contact with people who are sick  Avoid touching your eyes, nose, and mouth withunwashed hands  Wash your hands often with soap and water for at least 20 seconds  Use an alcohol-based hand  that contains at least 60% alcohol if soap and water are not available  If you are sick, to keep from spreading respiratory illness to others, you should   Stay home when you are sick      Cover your cough or sneeze with a tissue, then throw the tissue in the trash  Clean and disinfect frequently touched objectsand surfaces  What should I do if I recently traveled from an area with ongoing spread of COVID-19? If you have traveled from an affected area, there may be restrictions on your movements for up to 2 weeks  If you develop symptoms during that period (fever, cough, trouble breathing), seek medical advice  Call the office of your health care provider before you go, and tell them about your travel and your symptoms  They will give you instructions on how to get care without exposing other people to your illness  While sick, avoid contact with people, don't go out and delay any travel to reduce the possibility of spreading illness to others  Is there a vaccine? There is currently no vaccine to protect against COVID-19  The best way to prevent infection is to take everyday preventive actions, like avoiding close contact with people who are sick and washing your hands often  Is there a treatment? There is no specific antiviral treatment for COVID-19  People with COVID-19 can seek medical care to helprelieve symptoms  For more information: www cdc gov/RJFXG28WV 527115-K 03/03/2020        What to do if you are sick withcoronavirus disease 2019 (COVID-19)     If you are sick with COVID-19 or suspect you are infected with the virus that causes COVID-19, follow the steps below to help prevent the disease from spreading to people in your home and community  Stay home except to get medical care   You should restrict activities outside your home, except for getting medical care  Do not go to work, school, or public areas  Avoid using public transportation, ride-sharing, or taxis  Separate yourself from other people and animals inyour home  People: As much as possible, you should stay in a specific room and away from other people in your home  Also, you should use a separate bathroom, if available    Animals: Do not handle pets or other animals while sick  See COVID-19 and Animals for more information  Call ahead before visiting your doctor   If you have a medical appointment, call the healthcare provider and tell them that you have or may have COVID-19  This will help the healthcare provider's office take steps to keep other people from getting infected or exposed  Wear a facemask  You should wear a facemask when you are around other people (e g , sharing a room or vehicle) or pets and before you enter a healthcare provider's office  If you are not able to wear a facemask (for example, because it causes trouble breathing), then people who live with you should not stay in the same room with you, or they should wear a facemask if they enteryour room  Cover your coughs and sneezes   Cover your mouth and nose with a tissue when you cough or sneeze  Throw used tissues in a lined trash can; immediately wash your hands with soap and water for at least 20 seconds or clean your hands with an alcohol-based hand  that contains at least 60 to 95% alcohol, covering all surfaces of your hands and rubbing them together until they feel dry  Soap and water should be used preferentially if hands are visibly dirty  Avoid sharing personal household items   You should not share dishes, drinking glasses, cups, eating utensils, towels, or bedding with other people or pets in your home  After using these items, they should be washed thoroughly with soap and water  Clean your hands often  Wash your hands often with soap and water for at least 20 seconds  If soap and water are not available, clean your hands with an alcohol-based hand  that contains at least 60% alcohol, covering all surfaces of your hands and rubbing them together until they feel dry  Soap and water should be used preferentially if hands are visibly dirty  Avoid touching your eyes, nose, and mouth with unwashed hands    Clean all "high-touch" surfaces every day  High touch surfaces include counters, tabletops, doorknobs, bathroom fixtures, toilets, phones, keyboards, tablets, and bedside tables  Also, clean any surfaces that may have blood, stool, or body fluids on them  Use a household cleaning spray or wipe, according to the label instructions  Labels contain instructions for safe and effective use of the cleaning product including precautions you should take when applying the product, such as wearing gloves and making sure you have good ventilation during use of the product  Monitor your symptoms  Seek prompt medical attention if your illness is worsening (e g , difficulty breathing)  Before seeking care, call your healthcare provider and tell them that you have, or are being evaluated for, COVID-19  Put on a facemask before you enter the facility  These steps will help the healthcare provider's office to keep other people in the office or waiting room from getting infectedor exposed  Ask your healthcare provider to call the local or state health department  Persons who are placed under active monitoring or facilitated self-monitoring should follow instructions provided by their local health department or occupational health professionals, as appropriate  If you have a medical emergency and need to call 911, notify the dispatch personnel that you have, or are being evaluated for COVID-19  If possible, put on a facemask before emergency medical services arrive  Discontinuing home isolation  Patients with confirmed COVID-19 should remain under home isolation precautions until the risk of secondary transmission to others is thought to be low  The decision to discontinue home isolation precautions should be made on a case-by-case basis, in consultation with healthcare providers and state and local health departments  For more information: www cdc gov/VZAHX91RA 695194-Z 02/24/2020     Stay home when you are sick,except to get medical care    Wash your hands often with soap and water for at least 20 seconds  Cover your cough or sneeze with a tissue, then throw the tissue in the trash  Clean and disinfect frequently touched objects and surfaces  Avoid touching your eyes, nose, and mouth  STOP THE SPREAD OF GERMS  For more information: www cdc gov/COVID19 Avoid close contact with people who are sick  Help prevent the spread of respiratory diseases like COVID-19

## 2020-08-03 NOTE — PROGRESS NOTES
CM called and arranged [employer] for patient  Lyft anticipated to pick patient up in front of the hospital around 1300 today  Nrs staff made aware  CM will continue to follow patient's progress and assist with discharge planning needs

## 2020-08-03 NOTE — TREATMENT TEAM
AM rounds per report from previous shift: charlene     08/03/20 0753   Team Meeting   Meeting Type Daily Rounds   Team Members Present   Team Members Present Physician;Nurse;;Occupational Therapist   Physician Team Member [facility] [place]   Nursing Team Member [place], [place]   Care Management Team [facility]    karen to self, denies SI/HI, decreased anxiety, ready for discharge, one episode of diarrhea, requesting diabetic management treatment upon discharge

## 2020-08-03 NOTE — PROGRESS NOTES
Pt was resting in her room for much of the shift but was out in the milieu and social with her peers at times  Pt reports feeling sad because only one of her three sons have called her back  Pt denies anxiety today, reports that she feels her medications are working  Pt denies SI/HI  Pleasant in conversation, discussed her dogs and how she enjoys gardening  Pt asking whether she needs to do more to control her diabetes and denies having a physician that she follows up with regarding diabetes management

## 2020-08-03 NOTE — PLAN OF CARE
Problem: SELF HARM/SUICIDALITY  Goal: Will have no self-injury during hospital stay  Description: INTERVENTIONS:  - Q 15 MINUTES: Routine safety checks  - Q WAKING SHIFT & PRN: Assess risk to determine if routine checks are adequate to maintain patient safety  - Encourage patient to participate actively in care by formulating a plan to combat response to suicidal ideation, identify supports and resources  Outcome: Progressing     Problem: DEPRESSION  Goal: Will be euthymic at discharge  Description: INTERVENTIONS:  - Administer medication as ordered  - Provide emotional support via 1:1 interaction with staff  - Encourage involvement in milieu/groups/activities  - Monitor for social isolation  Outcome: Progressing     Problem: ANXIETY  Goal: Will report anxiety at manageable levels  Description: INTERVENTIONS:  - Administer medication as ordered  - Teach and encourage coping skills  - Provide emotional support  - Assess patient/family for anxiety and ability to cope  Outcome: Progressing  Goal: By discharge: Patient will verbalize 2 strategies to deal with anxiety  Description: Interventions:  - Identify any obvious source/trigger to anxiety  - Staff will assist patient in applying identified coping technique/skills  - Encourage attendance of scheduled groups and activities  Outcome: Progressing     Problem: Ineffective Coping  Goal: Participates in unit activities  Description: Interventions:  - Provide therapeutic environment   - Provide required programming   - Redirect inappropriate behaviors   Outcome: Progressing

## 2020-08-03 NOTE — NURSING NOTE
Pt expresses readiness to discharge  Reviewed AVS packet with patient, no questions  Scripts sent electronically to pt's pharmacy  No medications or belongings from safe to return to pt

## 2020-08-03 NOTE — BH TRANSITION RECORD
Contact Information: If you have any questions, concerns, pended studies, tests and/or procedures, or emergencies regarding your inpatient behavioral health visit  Please contact [facility] [phone] and ask to speak to a , nurse or physician  A contact is available 24 hours/ 7 days a week at this number  Summary of Procedures Performed During your Stay:  Below is a list of major procedures performed during your hospital stay and a summary of results:  - Cardiac Procedures/Studies: EKG  Pending Studies (From admission, onward)    None        If studies are pending at discharge, follow up with your PCP and/or referring provider

## 2020-08-21 ENCOUNTER — HOSPITAL ENCOUNTER (EMERGENCY)
Facility: HOSPITAL | Age: 55
Discharge: NURSING, REHAB OR LONG-TERM CARE | End: 2020-08-22
Attending: EMERGENCY MEDICINE | Admitting: EMERGENCY MEDICINE
Payer: MEDICARE

## 2020-08-21 DIAGNOSIS — F41.9 ANXIETY: Primary | ICD-10-CM

## 2020-08-21 DIAGNOSIS — R45.851 SUICIDAL IDEATION: ICD-10-CM

## 2020-08-21 LAB
AMPHETAMINES SERPL QL SCN: NEGATIVE
BACTERIA UR QL AUTO: ABNORMAL /HPF
BARBITURATES UR QL: NEGATIVE
BENZODIAZ UR QL: NEGATIVE
BILIRUB UR QL STRIP: ABNORMAL
CLARITY UR: CLEAR
COCAINE UR QL: NEGATIVE
COLOR UR: YELLOW
ETHANOL EXG-MCNC: 0.02 MG/DL
EXT PREG TEST URINE: NEGATIVE
EXT. CONTROL ED NAV: NORMAL
GLUCOSE SERPL-MCNC: 112 MG/DL (ref 65–140)
GLUCOSE UR STRIP-MCNC: NEGATIVE MG/DL
HGB UR QL STRIP.AUTO: NEGATIVE
KETONES UR STRIP-MCNC: ABNORMAL MG/DL
LEUKOCYTE ESTERASE UR QL STRIP: NEGATIVE
LITHIUM SERPL-SCNC: 0.8 MMOL/L (ref 0.5–1)
METHADONE UR QL: NEGATIVE
NITRITE UR QL STRIP: NEGATIVE
NON-SQ EPI CELLS URNS QL MICRO: ABNORMAL /HPF
OPIATES UR QL SCN: NEGATIVE
OXYCODONE+OXYMORPHONE UR QL SCN: NEGATIVE
PCP UR QL: NEGATIVE
PH UR STRIP.AUTO: 6 [PH] (ref 4.5–8)
PROT UR STRIP-MCNC: ABNORMAL MG/DL
RBC #/AREA URNS AUTO: ABNORMAL /HPF
SARS-COV-2 RNA RESP QL NAA+PROBE: NEGATIVE
SP GR UR STRIP.AUTO: 1.02 (ref 1–1.03)
THC UR QL: NEGATIVE
UROBILINOGEN UR QL STRIP.AUTO: 1 E.U./DL
WBC #/AREA URNS AUTO: ABNORMAL /HPF

## 2020-08-21 PROCEDURE — 36415 COLL VENOUS BLD VENIPUNCTURE: CPT | Performed by: EMERGENCY MEDICINE

## 2020-08-21 PROCEDURE — 81001 URINALYSIS AUTO W/SCOPE: CPT

## 2020-08-21 PROCEDURE — 99285 EMERGENCY DEPT VISIT HI MDM: CPT

## 2020-08-21 PROCEDURE — 99285 EMERGENCY DEPT VISIT HI MDM: CPT | Performed by: EMERGENCY MEDICINE

## 2020-08-21 PROCEDURE — 80178 ASSAY OF LITHIUM: CPT | Performed by: EMERGENCY MEDICINE

## 2020-08-21 PROCEDURE — 80307 DRUG TEST PRSMV CHEM ANLYZR: CPT | Performed by: EMERGENCY MEDICINE

## 2020-08-21 PROCEDURE — 82075 ASSAY OF BREATH ETHANOL: CPT | Performed by: EMERGENCY MEDICINE

## 2020-08-21 PROCEDURE — 81025 URINE PREGNANCY TEST: CPT | Performed by: EMERGENCY MEDICINE

## 2020-08-21 PROCEDURE — 87635 SARS-COV-2 COVID-19 AMP PRB: CPT | Performed by: EMERGENCY MEDICINE

## 2020-08-21 PROCEDURE — 82948 REAGENT STRIP/BLOOD GLUCOSE: CPT

## 2020-08-21 RX ORDER — HALOPERIDOL 5 MG/ML
5 INJECTION INTRAMUSCULAR EVERY 6 HOURS PRN
Status: CANCELLED | OUTPATIENT
Start: 2020-08-21

## 2020-08-21 RX ORDER — LORAZEPAM 1 MG/1
1 TABLET ORAL ONCE
Status: COMPLETED | OUTPATIENT
Start: 2020-08-21 | End: 2020-08-21

## 2020-08-21 RX ORDER — BENZTROPINE MESYLATE 1 MG/ML
1 INJECTION INTRAMUSCULAR; INTRAVENOUS EVERY 6 HOURS PRN
Status: CANCELLED | OUTPATIENT
Start: 2020-08-21

## 2020-08-21 RX ORDER — PANTOPRAZOLE SODIUM 40 MG/1
40 TABLET, DELAYED RELEASE ORAL
Status: CANCELLED | OUTPATIENT
Start: 2020-08-22

## 2020-08-21 RX ORDER — BUSPIRONE HYDROCHLORIDE 10 MG/1
20 TABLET ORAL 2 TIMES DAILY
Status: CANCELLED | OUTPATIENT
Start: 2020-08-21

## 2020-08-21 RX ORDER — LORAZEPAM 0.5 MG/1
0.5 TABLET ORAL EVERY 8 HOURS PRN
Status: CANCELLED | OUTPATIENT
Start: 2020-08-21

## 2020-08-21 RX ORDER — OLANZAPINE 10 MG/1
10 TABLET ORAL EVERY 8 HOURS PRN
Status: CANCELLED | OUTPATIENT
Start: 2020-08-21

## 2020-08-21 RX ORDER — ACETAMINOPHEN 325 MG/1
975 TABLET ORAL EVERY 6 HOURS PRN
Status: CANCELLED | OUTPATIENT
Start: 2020-08-21

## 2020-08-21 RX ORDER — IBUPROFEN 600 MG/1
600 TABLET ORAL EVERY 6 HOURS PRN
Status: CANCELLED | OUTPATIENT
Start: 2020-08-21

## 2020-08-21 RX ORDER — CLONIDINE HYDROCHLORIDE 0.1 MG/1
0.1 TABLET ORAL EVERY 12 HOURS SCHEDULED
Status: CANCELLED | OUTPATIENT
Start: 2020-08-21

## 2020-08-21 RX ORDER — GLIPIZIDE 5 MG/1
5 TABLET ORAL
Status: CANCELLED | OUTPATIENT
Start: 2020-08-22

## 2020-08-21 RX ORDER — HALOPERIDOL 5 MG
5 TABLET ORAL EVERY 6 HOURS PRN
Status: CANCELLED | OUTPATIENT
Start: 2020-08-21

## 2020-08-21 RX ORDER — LITHIUM CARBONATE 300 MG/1
300 CAPSULE ORAL DAILY
Status: CANCELLED | OUTPATIENT
Start: 2020-08-22

## 2020-08-21 RX ORDER — ACETAMINOPHEN 325 MG/1
325 TABLET ORAL EVERY 6 HOURS PRN
Status: CANCELLED | OUTPATIENT
Start: 2020-08-21

## 2020-08-21 RX ORDER — SUCRALFATE 1 G/1
1 TABLET ORAL
Status: CANCELLED | OUTPATIENT
Start: 2020-08-21

## 2020-08-21 RX ORDER — BENZTROPINE MESYLATE 1 MG/1
1 TABLET ORAL EVERY 6 HOURS PRN
Status: CANCELLED | OUTPATIENT
Start: 2020-08-21

## 2020-08-21 RX ORDER — ACETAMINOPHEN 325 MG/1
650 TABLET ORAL EVERY 4 HOURS PRN
Status: CANCELLED | OUTPATIENT
Start: 2020-08-21

## 2020-08-21 RX ORDER — LORAZEPAM 2 MG/ML
2 INJECTION INTRAMUSCULAR EVERY 6 HOURS PRN
Status: CANCELLED | OUTPATIENT
Start: 2020-08-21

## 2020-08-21 RX ORDER — MAGNESIUM HYDROXIDE/ALUMINUM HYDROXICE/SIMETHICONE 120; 1200; 1200 MG/30ML; MG/30ML; MG/30ML
30 SUSPENSION ORAL EVERY 4 HOURS PRN
Status: CANCELLED | OUTPATIENT
Start: 2020-08-21

## 2020-08-21 RX ORDER — TRAZODONE HYDROCHLORIDE 50 MG/1
50 TABLET ORAL
Status: CANCELLED | OUTPATIENT
Start: 2020-08-21

## 2020-08-21 RX ORDER — OLANZAPINE 10 MG/1
10 INJECTION, POWDER, LYOPHILIZED, FOR SOLUTION INTRAMUSCULAR EVERY 8 HOURS PRN
Status: CANCELLED | OUTPATIENT
Start: 2020-08-21

## 2020-08-21 RX ORDER — RISPERIDONE 1 MG/1
1 TABLET, ORALLY DISINTEGRATING ORAL
Status: CANCELLED | OUTPATIENT
Start: 2020-08-21

## 2020-08-21 RX ORDER — CLONAZEPAM 0.5 MG/1
0.5 TABLET ORAL 2 TIMES DAILY
Status: CANCELLED | OUTPATIENT
Start: 2020-08-21

## 2020-08-21 RX ORDER — HYDROXYZINE HYDROCHLORIDE 25 MG/1
50 TABLET, FILM COATED ORAL EVERY 6 HOURS PRN
Status: CANCELLED | OUTPATIENT
Start: 2020-08-21

## 2020-08-21 RX ORDER — HYDROXYZINE HYDROCHLORIDE 25 MG/1
25 TABLET, FILM COATED ORAL EVERY 6 HOURS PRN
Status: CANCELLED | OUTPATIENT
Start: 2020-08-21

## 2020-08-21 RX ADMIN — LORAZEPAM 1 MG: 1 TABLET ORAL at 13:28

## 2020-08-21 RX ADMIN — LORAZEPAM 1 MG: 1 TABLET ORAL at 18:01

## 2020-08-21 RX ADMIN — LORAZEPAM 1 MG: 1 TABLET ORAL at 09:31

## 2020-08-21 NOTE — ED NOTES
Patient is accepted at [facility]  Patient is accepted by [facility]  per  [employer] is arranged with slets  Transportation is scheduled for 1230am    Nurse report is to be called to [phone]   prior to patient transfer

## 2020-08-21 NOTE — ED NOTES
Meal tray delivered to patient at this time, patient currently eating        Rojelio Almanza, RN  08/21/20 4016

## 2020-08-21 NOTE — ED PROVIDER NOTES
History  Chief Complaint   Patient presents with    Anxiety     increased anxiety got worse today  pt taking meds no relief  tearful upon triage  when asked if SI "yeah if nobody helps me" denies HI/AH/VH     A 68-year-old female with past history of alcohol abuse, anxiety, bipolar disorder, depression, hyperlipidemia, hypertension, hypothyroidism, seizures and PTSD; presents with anxiety  Patient states she woke up this morning with an intense anxiety attack, unable to identify a trigger  Patient states she suffers from longstanding anxiety, which is intractable to most medications  Patient does report suicidal ideations, with a plan to overdose on her medications  Patient states she has taken her medications as a suicide attempt in the past   Patient otherwise denies homicidal ideations  Patient denies auditory/visual/command hallucinations  Patient denies fever, chills, chest pain, shortness of breath, abdominal pain, nausea, vomiting, diarrhea, peripheral edema and rashes  Of note, patient was admitted to inpatient [address] [place] [address] from 7/27-8/3 for similar symptoms  Patient states she no longer has any of the medications that were prescribed at discharge  A/P:  Anxiety, with suicidal ideations  Patient appears anxious, pacing around the exam room  Patient agreeable to sign a 201  Will discuss with crisis  Will give a dose of Ativan  History provided by:  Patient and medical records      Prior to Admission Medications   Prescriptions Last Dose Informant Patient Reported? Taking?    amLODIPine (NORVASC) 5 mg tablet   No Yes   Sig: TAKE 1 TABLET BY MOUTH DAILY   busPIRone (BUSPAR) 10 mg tablet   No Yes   Sig: Take 2 tablets (20 mg total) by mouth 3 (three) times a day At 9am, 4pm, 9pm   cloNIDine (CATAPRES) 0 1 mg tablet   No Yes   Sig: Take 1 tablet (0 1 mg total) by mouth every 12 (twelve) hours At 9am and 9pm   clonazePAM (KlonoPIN) 0 5 mg tablet   No Yes   Sig: Take 1 tablet (0 5 mg total) by mouth 2 (two) times a day At 9am and 6pm   folic acid (FOLVITE) 1 mg tablet   No Yes   Sig: Take 1 tablet (1 mg total) by mouth daily   gabapentin (NEURONTIN) 400 mg capsule   No Yes   Sig: Take 2 capsules (800 mg total) by mouth 4 (four) times a day   glipiZIDE (GLUCOTROL) 5 mg tablet   No Yes   Sig: Take 1 tablet (5 mg total) by mouth daily At 9am   lithium carbonate 150 mg capsule   No No   Sig: Take 3 capsules (450 mg total) by mouth daily at bedtime   lithium carbonate 300 mg capsule   No Yes   Sig: Take 1 capsule (300 mg total) by mouth every morning   metoprolol tartrate (LOPRESSOR) 25 mg tablet   No Yes   Sig: Take 0 5 tablets (12 5 mg total) by mouth every 12 (twelve) hours   pantoprazole (PROTONIX) 40 mg tablet   No Yes   Sig: Take 1 tablet (40 mg total) by mouth daily in the early morning   sucralfate (CARAFATE) 1 g tablet   No Yes   Sig: Take 1 tablet (1 g total) by mouth 4 (four) times a day (before meals and at bedtime)   traZODone (DESYREL) 100 mg tablet   No Yes   Sig: Take 1 tablet (100 mg total) by mouth daily at bedtime as needed for sleep      Facility-Administered Medications: None       Past Medical History:   Diagnosis Date    Alcohol abuse     [facility]     Anxiety     Bipolar disorder (HCC)     Bowel obstruction (HCC)     Depression     Gastritis     Head injury     Heart palpitations     Hyperlipidemia     Hypertension     Hypothyroidism     Psychiatric illness     PTSD (post-traumatic stress disorder)     Seizure ([facility] )     Sleep difficulties     Suicide attempt [facility])        Past Surgical History:   Procedure Laterality Date    ABDOMINAL SURGERY      APPENDECTOMY      BOWEL RESECTION      CHOLECYSTECTOMY      ESOPHAGOGASTRODUODENOSCOPY N/A 5/18/2018    Procedure: ESOPHAGOGASTRODUODENOSCOPY (EGD) with bx;  Surgeon: Brian Mckeon DO;  Location: [facility];   Service: Gastroenterology    HYSTERECTOMY      KNEE SURGERY Bilateral        Family History   Problem Relation Age of Onset    Diabetes Father      I have reviewed and agree with the history as documented  E-Cigarette/Vaping    E-Cigarette Use Never User      E-Cigarette/Vaping Substances     Social History     Tobacco Use    Smoking status: Current Every Day Smoker     Packs/day: 1 00     Years: 25 00     Pack years: 25 00     Types: Cigarettes    Smokeless tobacco: Never Used   Substance Use Topics    Alcohol use: Yes     Alcohol/week: 6 0 standard drinks     Types: 6 Cans of beer per week     Frequency: 4 or more times a week     Drinks per session: 5 or 6     Binge frequency: Weekly     Comment: last drink on 08/15/20    Drug use: No       Review of Systems   Psychiatric/Behavioral: Positive for suicidal ideas  The patient is nervous/anxious  All other systems reviewed and are negative  Physical Exam  Physical Exam  General Appearance: alert and oriented, tearful and anxious appearing  Skin:  Warm, dry, intact  HEENT: atraumatic, normocephalic  Neck: Supple, trachea midline  Cardiac: RRR; no murmurs, rub, gallops  Pulmonary: lungs CTAB; no wheezes, rales, rhonchi  Gastrointestinal: abdomen soft, nontender, nondistended; no guarding or rebound tenderness; good bowel sounds, no mass or bruits  Extremities:  no pedal edema, 2+ pulses; no calf tenderness, no clubbing, no cyanosis  Neuro:  no focal motor or sensory deficits, CN 2-12 grossly intact  Psych:  Anxious appearing  Pacing around the exam room        Vital Signs  ED Triage Vitals [08/21/20 0955]   Temperature Pulse Respirations Blood Pressure SpO2   98 2 °F (36 8 °C) 97 18 169/98 97 %      Temp Source Heart Rate Source Patient Position - Orthostatic VS BP Location FiO2 (%)   Oral Monitor Sitting Right arm --      Pain Score       --           Vitals:    08/21/20 0955 08/21/20 1329   BP: 169/98 133/94   Pulse: 97 83   Patient Position - Orthostatic VS: Sitting Sitting         Visual Acuity      ED Medications  Medications   LORazepam (ATIVAN) tablet 1 mg (1 mg Oral Given 8/21/20 0931)   LORazepam (ATIVAN) tablet 1 mg (1 mg Oral Given 8/21/20 1328)       Diagnostic Studies  Results Reviewed     Procedure Component Value Units Date/Time    Fingerstick Glucose (POCT) [[id number]]  (Normal) Collected:  08/21/20 1226    Lab Status:  Final result Updated:  08/21/20 1227     POC Glucose 112 mg/dl     Novel Coronavirus (Covid-19),PCR [facility] [[id number]]  (Normal) Collected:  08/21/20 0938    Lab Status:  Final result Specimen:  Nares from Nose Updated:  08/21/20 1200     SARS-CoV-2 Negative    Narrative: The specimen collection materials, transport medium, and/or testing methodology utilized in the production of these test results have been proven to be reliable in a limited validation with an abbreviated program under the Emergency Utilization Authorization provided by the FDA  Testing reported as "Presumptive positive" will be confirmed with secondary testing with a reference laboratory to ensure result accuracy  Clinical caution and judgement should be used with the interpretation of these results with consideration of the clinical impression and other laboratory testing  Testing reported as "Positive" or "Negative" has been proven to be accurate according to standard laboratory validation requirements  All testing is performed with control materials showing appropriate reactivity at standard intervals        Urine Microscopic [[id number]]  (Abnormal) Collected:  08/21/20 0933    Lab Status:  Final result Specimen:  Urine, Clean Catch Updated:  08/21/20 1059     RBC, UA 0-1 /hpf      WBC, UA 1-2 /hpf      Epithelial Cells Occasional /hpf      Bacteria, UA Occasional /hpf     Narrative:       Concentrated Microscopic on low volume urine    Lithium level [[id number]]  (Normal) Collected:  08/21/20 0938    Lab Status:  Final result Specimen:  Blood from Arm, Right Updated:  08/21/20 1008     [place] Lvl 0 8 mmol/L     POCT alcohol breath test [[id number]] (Normal) Resulted:  08/21/20 0955    Lab Status:  Final result Updated:  08/21/20 0955     EXTBreath Alcohol 0 021    Rapid drug screen, urine [[id number]] Updated:  08/21/20 0946    Lab Status:  No result Specimen:  Urine, Clean Catch     POCT pregnancy, urine [[id number]]  (Normal) Resulted:  08/21/20 0938    Lab Status:  Final result Updated:  08/21/20 0938     EXT PREG TEST UR (Ref: Negative) negative     Control valid    POCT urinalysis dipstick [[id number]]  (Abnormal) Resulted:  08/21/20 0938    Lab Status:  Final result Specimen:  Urine Updated:  08/21/20 0938    Urine Macroscopic, POC [[id number]]  (Abnormal) Collected:  08/21/20 0933    Lab Status:  Final result Specimen:  Urine Updated:  08/21/20 0935     Color, UA Yellow     Clarity, UA Clear     pH, UA 6 0     Leukocytes, UA Negative     Nitrite, UA Negative     Protein, UA 30 (1+) mg/dl      Glucose, UA Negative mg/dl      Ketones, UA Trace mg/dl      Urobilinogen, UA 1 0 E U /dl      Bilirubin, UA Interference- unable to analyze     Blood, UA Negative     Specific Gravity, UA 1 025    Narrative:       CLINITEK RESULT                 No orders to display              Procedures  Procedures         ED Course  ED Course as of Aug 21 1501   Fri Aug 21, 2020   1146 Patient signed a 201                                                MDM      Disposition  Final diagnoses:   Anxiety   Suicidal ideation     Time reflects when diagnosis was documented in both MDM as applicable and the Disposition within this note     Time User Action Codes Description Comment    8/21/2020 12:26 PM Zac Coombs Add [F41 9] Anxiety     8/21/2020 12:26 PM Walt, Tino [address] Suicidal ideation       ED Disposition     ED Disposition Condition Date/Time Comment    Transfer to [address] [place] [address]  Fri Aug 21, 2020 12:26 PM Hillary Reza should be transferred out to [address] [place] [address] and has been medically cleared           MD Documentation      Most Recent Value   Patient Condition  The patient has been stabilized such that within reasonable medical probability, no material deterioration of the patient condition or the condition of the unborn child(sage) is likely to result from the transfer   Reason for Transfer  Level of Care needed not available at this facility [inpt psych]   Benefits of Transfer  Specialized equipment and/or services available at the receiving facility (Include comment)________________________ [inpt psych]   Risks of Transfer  Potential for delay in receiving treatment, Potential deterioration of medical condition, Increased discomfort during transfer, Possible worsening of condition or death during transfer   Sending MD Dr Erum Carias   Provider Certification  General risk, such as traffic hazards, adverse weather conditions, rough terrain or turbulence, possible failure of equipment (including vehicle or aircraft), or consequences of actions of persons outside the control of the transport personnel      Follow-up Information    None         Patient's Medications   Discharge Prescriptions    No medications on file     No discharge procedures on file      PDMP Review       Value Time User    PDMP Reviewed  Yes 7/28/2020  8:33 PM Lorelei Theodore DO          ED Provider  Electronically Signed by           [address] Spe[address] Greco DO  08/21/20 9732

## 2020-08-21 NOTE — EMTALA/ACUTE CARE TRANSFER
[facility] [address]  [address] [facility] [address]  Dept: [phone]      EMTALA TRANSFER CONSENT    NAME Jack Riley                                         1965                              MRN [id number]    I have been informed of my rights regarding examination, treatment, and transfer   by Dr José Miguel Irving DO    Benefits: Specialized equipment and/or services available at the receiving facility (Include comment)________________________(inpt psych)    Risks: Potential for delay in receiving treatment, Potential deterioration of medical condition, Increased discomfort during transfer, Possible worsening of condition or death during transfer      Consent for Transfer:  I acknowledge that my medical condition has been evaluated and explained to me by the emergency department physician or other qualified medical person and/or my attending physician, who has recommended that I be transferred to the service of    at    The above potential benefits of such transfer, the potential risks associated with such transfer, and the probable risks of not being transferred have been explained to me, and I fully understand them  The doctor has explained that, in my case, the benefits of transfer outweigh the risks  I agree to be transferred  I authorize the performance of emergency medical procedures and treatments upon me in both transit and upon arrival at the receiving facility  Additionally, I authorize the release of any and all medical records to the receiving facility and request they be transported with me, if possible  I understand that the safest mode of transportation during a medical emergency is an ambulance and that the Hospital advocates the use of this mode of transport   Risks of traveling to the receiving facility by car, including absence of medical control, life sustaining equipment, such as oxygen, and medical personnel has been explained to me and I fully understand them  (TIM CORRECT BOX BELOW)  [  ]  I consent to the stated transfer and to be transported by ambulance/helicopter  [  ]  I consent to the stated transfer, but refuse transportation by ambulance and accept full responsibility for my transportation by car  I understand the risks of non-ambulance transfers and I exonerate the Hospital and its staff from any deterioration in my condition that results from this refusal     X___________________________________________    DATE  20  TIME________  Signature of patient or legally responsible individual signing on patient behalf           RELATIONSHIP TO PATIENT_________________________          Provider Certification    NAME Jack Riley                                        [facility] 1965                              MRN [id number]    A medical screening exam was performed on the above named patient  Based on the examination:    Condition Necessitating Transfer The primary encounter diagnosis was Anxiety  A diagnosis of Suicidal ideation was also pertinent to this visit      Patient Condition: The patient has been stabilized such that within reasonable medical probability, no material deterioration of the patient condition or the condition of the unborn child(sage) is likely to result from the transfer    Reason for Transfer: Level of Care needed not available at this facility(inpt psych)    Transfer Requirements: Facility     · Space available and qualified personnel available for treatment as acknowledged by    · Agreed to accept transfer and to provide appropriate medical treatment as acknowledged by          · Appropriate medical records of the examination and treatment of the patient are provided at the time of transfer   [address] _______  · Transfer will be performed by qualified personnel from    and appropriate transfer equipment as required, including the use of necessary and appropriate life support measures  Provider Certification: I have examined the patient and explained the following risks and benefits of being transferred/refusing transfer to the patient/family:  General risk, such as traffic hazards, adverse weather conditions, rough terrain or turbulence, possible failure of equipment (including vehicle or aircraft), or consequences of actions of persons outside the control of the transport personnel      Based on these reasonable risks and benefits to the patient and/or the unborn child(sage), and based upon the information available at the time of the patients examination, I certify that the medical benefits reasonably to be expected from the provision of appropriate medical treatments at another medical facility outweigh the increasing risks, if any, to the individuals medical condition, and in the case of labor to the unborn child, from effecting the transfer      X____________________________________________ DATE 08/21/20        TIME_______      ORIGINAL - SEND TO MEDICAL RECORDS   COPY - SEND WITH PATIENT DURING TRANSFER

## 2020-08-21 NOTE — ED NOTES
Patient reports she is suicidal with a plabn to overdose on her medications  She reports she has been very stressed with her anxiety and she wants to be normal   She reports her son was recently diagnosed with cancer and that is contributing to her anxiety  Patient has services through [facility] and has a therapist and psychiatrist   She does not feel safe and wants to sign a 201 for treatment

## 2020-08-22 ENCOUNTER — HOSPITAL ENCOUNTER (INPATIENT)
Facility: HOSPITAL | Age: 55
LOS: 6 days | Discharge: HOME | DRG: 885 | End: 2020-08-28
Attending: STUDENT IN AN ORGANIZED HEALTH CARE EDUCATION/TRAINING PROGRAM | Admitting: STUDENT IN AN ORGANIZED HEALTH CARE EDUCATION/TRAINING PROGRAM
Payer: MEDICARE

## 2020-08-22 VITALS
HEART RATE: 92 BPM | DIASTOLIC BLOOD PRESSURE: 95 MMHG | OXYGEN SATURATION: 100 % | SYSTOLIC BLOOD PRESSURE: 167 MMHG | RESPIRATION RATE: 18 BRPM | TEMPERATURE: 98.2 F

## 2020-08-22 DIAGNOSIS — E11.9 TYPE 2 DIABETES MELLITUS WITHOUT COMPLICATION, WITHOUT LONG-TERM CURRENT USE OF INSULIN (HCC): Primary | ICD-10-CM

## 2020-08-22 DIAGNOSIS — F41.1 GENERALIZED ANXIETY DISORDER: Chronic | ICD-10-CM

## 2020-08-22 DIAGNOSIS — G47.00 INSOMNIA: ICD-10-CM

## 2020-08-22 DIAGNOSIS — F41.9 ANXIETY: ICD-10-CM

## 2020-08-22 DIAGNOSIS — F31.4 BIPOLAR DISORDER, CURRENT EPISODE DEPRESSED, SEVERE, WITHOUT PSYCHOTIC FEATURES (HCC): ICD-10-CM

## 2020-08-22 LAB
ALBUMIN SERPL BCP-MCNC: 3.4 G/DL (ref 3.5–5)
ALP SERPL-CCNC: 99 U/L (ref 46–116)
ALT SERPL W P-5'-P-CCNC: 64 U/L (ref 12–78)
ANION GAP SERPL CALCULATED.3IONS-SCNC: 9 MMOL/L (ref 4–13)
AST SERPL W P-5'-P-CCNC: 24 U/L (ref 5–45)
BASOPHILS # BLD AUTO: 0.03 THOUSANDS/ΜL (ref 0–0.1)
BASOPHILS NFR BLD AUTO: 0 % (ref 0–1)
BILIRUB SERPL-MCNC: 0.7 MG/DL (ref 0.2–1)
BUN SERPL-MCNC: 10 MG/DL (ref 5–25)
CALCIUM SERPL-MCNC: 8.3 MG/DL (ref 8.3–10.1)
CHLORIDE SERPL-SCNC: 101 MMOL/L (ref 100–108)
CO2 SERPL-SCNC: 26 MMOL/L (ref 21–32)
CREAT SERPL-MCNC: 0.78 MG/DL (ref 0.6–1.3)
EOSINOPHIL # BLD AUTO: 0.05 THOUSAND/ΜL (ref 0–0.61)
EOSINOPHIL NFR BLD AUTO: 1 % (ref 0–6)
ERYTHROCYTE [DISTWIDTH] IN BLOOD BY AUTOMATED COUNT: 14 % (ref 11.6–15.1)
GFR SERPL CREATININE-BSD FRML MDRD: 88 ML/MIN/1.73SQ M
GLUCOSE SERPL-MCNC: 115 MG/DL (ref 65–140)
GLUCOSE SERPL-MCNC: 116 MG/DL (ref 65–140)
GLUCOSE SERPL-MCNC: 135 MG/DL (ref 65–140)
GLUCOSE SERPL-MCNC: 138 MG/DL (ref 65–140)
GLUCOSE SERPL-MCNC: 148 MG/DL (ref 65–140)
HCG SERPL QL: NEGATIVE
HCT VFR BLD AUTO: 41.5 % (ref 34.8–46.1)
HGB BLD-MCNC: 13.6 G/DL (ref 11.5–15.4)
IMM GRANULOCYTES # BLD AUTO: 0.03 THOUSAND/UL (ref 0–0.2)
IMM GRANULOCYTES NFR BLD AUTO: 0 % (ref 0–2)
LYMPHOCYTES # BLD AUTO: 2.74 THOUSANDS/ΜL (ref 0.6–4.47)
LYMPHOCYTES NFR BLD AUTO: 32 % (ref 14–44)
MCH RBC QN AUTO: 32.2 PG (ref 26.8–34.3)
MCHC RBC AUTO-ENTMCNC: 32.8 G/DL (ref 31.4–37.4)
MCV RBC AUTO: 98 FL (ref 82–98)
MONOCYTES # BLD AUTO: 0.72 THOUSAND/ΜL (ref 0.17–1.22)
MONOCYTES NFR BLD AUTO: 8 % (ref 4–12)
NEUTROPHILS # BLD AUTO: 5.14 THOUSANDS/ΜL (ref 1.85–7.62)
NEUTS SEG NFR BLD AUTO: 59 % (ref 43–75)
NRBC BLD AUTO-RTO: 0 /100 WBCS
PLATELET # BLD AUTO: 322 THOUSANDS/UL (ref 149–390)
PMV BLD AUTO: 9.9 FL (ref 8.9–12.7)
POTASSIUM SERPL-SCNC: 3.8 MMOL/L (ref 3.5–5.3)
PROT SERPL-MCNC: 6.8 G/DL (ref 6.4–8.2)
RBC # BLD AUTO: 4.23 MILLION/UL (ref 3.81–5.12)
SODIUM SERPL-SCNC: 136 MMOL/L (ref 136–145)
T4 FREE SERPL-MCNC: 1.1 NG/DL (ref 0.76–1.46)
TSH SERPL DL<=0.05 MIU/L-ACNC: 6.46 UIU/ML (ref 0.36–3.74)
WBC # BLD AUTO: 8.71 THOUSAND/UL (ref 4.31–10.16)

## 2020-08-22 PROCEDURE — 99222 1ST HOSP IP/OBS MODERATE 55: CPT | Performed by: PHYSICIAN ASSISTANT

## 2020-08-22 PROCEDURE — 84439 ASSAY OF FREE THYROXINE: CPT | Performed by: PHYSICIAN ASSISTANT

## 2020-08-22 PROCEDURE — 84703 CHORIONIC GONADOTROPIN ASSAY: CPT | Performed by: PHYSICIAN ASSISTANT

## 2020-08-22 PROCEDURE — 99222 1ST HOSP IP/OBS MODERATE 55: CPT | Performed by: STUDENT IN AN ORGANIZED HEALTH CARE EDUCATION/TRAINING PROGRAM

## 2020-08-22 PROCEDURE — 85025 COMPLETE CBC W/AUTO DIFF WBC: CPT | Performed by: PHYSICIAN ASSISTANT

## 2020-08-22 PROCEDURE — 82948 REAGENT STRIP/BLOOD GLUCOSE: CPT

## 2020-08-22 PROCEDURE — 80053 COMPREHEN METABOLIC PANEL: CPT | Performed by: PHYSICIAN ASSISTANT

## 2020-08-22 PROCEDURE — 84443 ASSAY THYROID STIM HORMONE: CPT | Performed by: PHYSICIAN ASSISTANT

## 2020-08-22 RX ORDER — CLONAZEPAM 0.5 MG/1
0.5 TABLET ORAL 2 TIMES DAILY
Status: DISCONTINUED | OUTPATIENT
Start: 2020-08-22 | End: 2020-08-22

## 2020-08-22 RX ORDER — PANTOPRAZOLE SODIUM 40 MG/1
40 TABLET, DELAYED RELEASE ORAL
Status: DISCONTINUED | OUTPATIENT
Start: 2020-08-22 | End: 2020-08-28 | Stop reason: HOSPADM

## 2020-08-22 RX ORDER — LITHIUM CARBONATE 300 MG/1
300 CAPSULE ORAL DAILY
Status: DISCONTINUED | OUTPATIENT
Start: 2020-08-22 | End: 2020-08-28 | Stop reason: HOSPADM

## 2020-08-22 RX ORDER — NICOTINE 21 MG/24HR
1 PATCH, TRANSDERMAL 24 HOURS TRANSDERMAL DAILY
Status: DISCONTINUED | OUTPATIENT
Start: 2020-08-22 | End: 2020-08-28 | Stop reason: HOSPADM

## 2020-08-22 RX ORDER — HYDROXYZINE HYDROCHLORIDE 25 MG/1
25 TABLET, FILM COATED ORAL EVERY 6 HOURS PRN
Status: DISCONTINUED | OUTPATIENT
Start: 2020-08-22 | End: 2020-08-22

## 2020-08-22 RX ORDER — BENZTROPINE MESYLATE 1 MG/ML
1 INJECTION INTRAMUSCULAR; INTRAVENOUS EVERY 6 HOURS PRN
Status: DISCONTINUED | OUTPATIENT
Start: 2020-08-22 | End: 2020-08-28 | Stop reason: HOSPADM

## 2020-08-22 RX ORDER — OLANZAPINE 10 MG/1
10 TABLET ORAL EVERY 8 HOURS PRN
Status: DISCONTINUED | OUTPATIENT
Start: 2020-08-22 | End: 2020-08-28 | Stop reason: HOSPADM

## 2020-08-22 RX ORDER — CLONIDINE HYDROCHLORIDE 0.1 MG/1
0.1 TABLET ORAL EVERY 12 HOURS SCHEDULED
Status: DISCONTINUED | OUTPATIENT
Start: 2020-08-22 | End: 2020-08-22

## 2020-08-22 RX ORDER — ACETAMINOPHEN 325 MG/1
650 TABLET ORAL EVERY 4 HOURS PRN
Status: DISCONTINUED | OUTPATIENT
Start: 2020-08-22 | End: 2020-08-28 | Stop reason: HOSPADM

## 2020-08-22 RX ORDER — LORAZEPAM 0.5 MG/1
0.5 TABLET ORAL EVERY 8 HOURS PRN
Status: DISCONTINUED | OUTPATIENT
Start: 2020-08-22 | End: 2020-08-22

## 2020-08-22 RX ORDER — OLANZAPINE 10 MG/1
10 INJECTION, POWDER, LYOPHILIZED, FOR SOLUTION INTRAMUSCULAR EVERY 8 HOURS PRN
Status: DISCONTINUED | OUTPATIENT
Start: 2020-08-22 | End: 2020-08-28 | Stop reason: HOSPADM

## 2020-08-22 RX ORDER — HALOPERIDOL 5 MG/ML
5 INJECTION INTRAMUSCULAR EVERY 6 HOURS PRN
Status: DISCONTINUED | OUTPATIENT
Start: 2020-08-22 | End: 2020-08-28 | Stop reason: HOSPADM

## 2020-08-22 RX ORDER — RISPERIDONE 1 MG/1
1 TABLET, ORALLY DISINTEGRATING ORAL
Status: DISCONTINUED | OUTPATIENT
Start: 2020-08-22 | End: 2020-08-28 | Stop reason: HOSPADM

## 2020-08-22 RX ORDER — HYDROXYZINE 50 MG/1
50 TABLET, FILM COATED ORAL EVERY 6 HOURS PRN
Status: DISCONTINUED | OUTPATIENT
Start: 2020-08-22 | End: 2020-08-25

## 2020-08-22 RX ORDER — GLIPIZIDE 5 MG/1
5 TABLET ORAL
Status: DISCONTINUED | OUTPATIENT
Start: 2020-08-22 | End: 2020-08-28 | Stop reason: HOSPADM

## 2020-08-22 RX ORDER — BUSPIRONE HYDROCHLORIDE 10 MG/1
20 TABLET ORAL 2 TIMES DAILY
Status: DISCONTINUED | OUTPATIENT
Start: 2020-08-22 | End: 2020-08-22

## 2020-08-22 RX ORDER — LORAZEPAM 1 MG/1
1 TABLET ORAL ONCE
Status: COMPLETED | OUTPATIENT
Start: 2020-08-22 | End: 2020-08-22

## 2020-08-22 RX ORDER — HYDROXYZINE 50 MG/1
50 TABLET, FILM COATED ORAL EVERY 6 HOURS PRN
Status: DISCONTINUED | OUTPATIENT
Start: 2020-08-22 | End: 2020-08-22

## 2020-08-22 RX ORDER — AMLODIPINE BESYLATE 5 MG/1
5 TABLET ORAL DAILY
Status: DISCONTINUED | OUTPATIENT
Start: 2020-08-22 | End: 2020-08-28 | Stop reason: HOSPADM

## 2020-08-22 RX ORDER — BUSPIRONE HYDROCHLORIDE 10 MG/1
20 TABLET ORAL 3 TIMES DAILY
Status: DISCONTINUED | OUTPATIENT
Start: 2020-08-22 | End: 2020-08-28 | Stop reason: HOSPADM

## 2020-08-22 RX ORDER — TRAZODONE HYDROCHLORIDE 50 MG/1
50 TABLET ORAL
Status: DISCONTINUED | OUTPATIENT
Start: 2020-08-22 | End: 2020-08-26

## 2020-08-22 RX ORDER — GABAPENTIN 400 MG/1
800 CAPSULE ORAL 4 TIMES DAILY
Status: DISCONTINUED | OUTPATIENT
Start: 2020-08-22 | End: 2020-08-28 | Stop reason: HOSPADM

## 2020-08-22 RX ORDER — ACETAMINOPHEN 325 MG/1
325 TABLET ORAL EVERY 6 HOURS PRN
Status: DISCONTINUED | OUTPATIENT
Start: 2020-08-22 | End: 2020-08-28 | Stop reason: HOSPADM

## 2020-08-22 RX ORDER — LORAZEPAM 2 MG/ML
2 INJECTION INTRAMUSCULAR EVERY 6 HOURS PRN
Status: DISCONTINUED | OUTPATIENT
Start: 2020-08-22 | End: 2020-08-28 | Stop reason: HOSPADM

## 2020-08-22 RX ORDER — SUCRALFATE 1 G/1
1 TABLET ORAL
Status: DISCONTINUED | OUTPATIENT
Start: 2020-08-22 | End: 2020-08-28 | Stop reason: HOSPADM

## 2020-08-22 RX ORDER — ACETAMINOPHEN 325 MG/1
975 TABLET ORAL EVERY 6 HOURS PRN
Status: DISCONTINUED | OUTPATIENT
Start: 2020-08-22 | End: 2020-08-28 | Stop reason: HOSPADM

## 2020-08-22 RX ORDER — CLONAZEPAM 1 MG/1
1 TABLET ORAL 2 TIMES DAILY
Status: DISCONTINUED | OUTPATIENT
Start: 2020-08-22 | End: 2020-08-23

## 2020-08-22 RX ORDER — HALOPERIDOL 5 MG
5 TABLET ORAL EVERY 6 HOURS PRN
Status: DISCONTINUED | OUTPATIENT
Start: 2020-08-22 | End: 2020-08-28 | Stop reason: HOSPADM

## 2020-08-22 RX ORDER — BENZTROPINE MESYLATE 1 MG/1
1 TABLET ORAL EVERY 6 HOURS PRN
Status: DISCONTINUED | OUTPATIENT
Start: 2020-08-22 | End: 2020-08-28 | Stop reason: HOSPADM

## 2020-08-22 RX ORDER — MAGNESIUM HYDROXIDE/ALUMINUM HYDROXICE/SIMETHICONE 120; 1200; 1200 MG/30ML; MG/30ML; MG/30ML
30 SUSPENSION ORAL EVERY 4 HOURS PRN
Status: DISCONTINUED | OUTPATIENT
Start: 2020-08-22 | End: 2020-08-28 | Stop reason: HOSPADM

## 2020-08-22 RX ADMIN — METOPROLOL TARTRATE 12.5 MG: 25 TABLET ORAL at 02:40

## 2020-08-22 RX ADMIN — METOPROLOL TARTRATE 12.5 MG: 25 TABLET ORAL at 09:01

## 2020-08-22 RX ADMIN — GABAPENTIN 800 MG: 400 CAPSULE ORAL at 21:24

## 2020-08-22 RX ADMIN — NICOTINE 1 PATCH: 14 PATCH TRANSDERMAL at 17:09

## 2020-08-22 RX ADMIN — LITHIUM CARBONATE 450 MG: 300 CAPSULE, GELATIN COATED ORAL at 02:39

## 2020-08-22 RX ADMIN — GABAPENTIN 800 MG: 400 CAPSULE ORAL at 09:01

## 2020-08-22 RX ADMIN — LITHIUM CARBONATE 300 MG: 300 CAPSULE, GELATIN COATED ORAL at 09:02

## 2020-08-22 RX ADMIN — BUSPIRONE HYDROCHLORIDE 20 MG: 10 TABLET ORAL at 20:59

## 2020-08-22 RX ADMIN — GABAPENTIN 800 MG: 400 CAPSULE ORAL at 11:26

## 2020-08-22 RX ADMIN — SUCRALFATE 1 G: 1 TABLET ORAL at 17:00

## 2020-08-22 RX ADMIN — SUCRALFATE 1 G: 1 TABLET ORAL at 06:52

## 2020-08-22 RX ADMIN — LORAZEPAM 0.5 MG: 0.5 TABLET ORAL at 02:41

## 2020-08-22 RX ADMIN — CLONIDINE HYDROCHLORIDE 0.1 MG: 0.1 TABLET ORAL at 09:01

## 2020-08-22 RX ADMIN — GLIPIZIDE 5 MG: 5 TABLET ORAL at 09:01

## 2020-08-22 RX ADMIN — LITHIUM CARBONATE 450 MG: 300 CAPSULE, GELATIN COATED ORAL at 21:25

## 2020-08-22 RX ADMIN — LORAZEPAM 1 MG: 1 TABLET ORAL at 14:37

## 2020-08-22 RX ADMIN — BUSPIRONE HYDROCHLORIDE 20 MG: 10 TABLET ORAL at 17:00

## 2020-08-22 RX ADMIN — PANTOPRAZOLE SODIUM 40 MG: 40 TABLET, DELAYED RELEASE ORAL at 06:52

## 2020-08-22 RX ADMIN — AMLODIPINE BESYLATE 5 MG: 5 TABLET ORAL at 09:02

## 2020-08-22 RX ADMIN — SUCRALFATE 1 G: 1 TABLET ORAL at 21:24

## 2020-08-22 RX ADMIN — TRAZODONE HYDROCHLORIDE 50 MG: 50 TABLET ORAL at 02:41

## 2020-08-22 RX ADMIN — BUSPIRONE HYDROCHLORIDE 20 MG: 10 TABLET ORAL at 09:02

## 2020-08-22 RX ADMIN — CLONAZEPAM 0.5 MG: 0.5 TABLET ORAL at 09:01

## 2020-08-22 RX ADMIN — HYDROXYZINE HYDROCHLORIDE 50 MG: 50 TABLET, FILM COATED ORAL at 12:25

## 2020-08-22 RX ADMIN — SUCRALFATE 1 G: 1 TABLET ORAL at 11:26

## 2020-08-22 RX ADMIN — GABAPENTIN 800 MG: 400 CAPSULE ORAL at 17:12

## 2020-08-22 RX ADMIN — CLONIDINE HYDROCHLORIDE 0.1 MG: 0.1 TABLET ORAL at 02:40

## 2020-08-22 RX ADMIN — CLONAZEPAM 1 MG: 1 TABLET ORAL at 17:11

## 2020-08-22 NOTE — PROGRESS NOTES
201 from TJ Riley 89 with plan to OD on medication   Her anxiety was so high that she was SI if not admitted inpt   On appearance, she is disheveled, blunted and flat   Pt was recently inpatient here   Asked pt if her anxiety medications were good when we left, she said yes   She said they stopped working when she got home   UDS negative, BAT 0 021   PAWWS 4   [place] Level 0 8   Pt Diabetic   Allergy to Latex   High fall risk due to history of seizures   Denied SI, HI and AVH   Gave PRN Ativan 0 5mg

## 2020-08-22 NOTE — TREATMENT TEAM
RN will meet with pt twice daily to educated on diagnosis, medications and assess symptoms  We will teach and utilize healthy coping skills

## 2020-08-22 NOTE — CONSULTS
Consult- Naman Keenan 1965, 48 y o  female MRN: [id number]    Unit/Bed#: [facility] 260-01 Encounter: [id number]    Primary Care Provider: YONG Swanson   Date and time admitted to hospital: 8/22/2020  1:30 AM      Inpatient consult for Medical Clearance for [facility] patient  Consult performed by: Criss Coleman PA-C  Consult ordered by: Balaji Terrell PA-C        Medical clearance for psychiatric admission  Assessment & Plan  · Vital signs stable at time of assessment  · CBC, CMP, TSH pending  · Patient appears medically stable at this time for inpatient psychiatric treatment    Essential hypertension  Assessment & Plan  · Blood pressure stable  · Continue clonidine, metoprolol, amlodipine    Type 2 diabetes mellitus without complication, without long-term current use of insulin ([facility] )  Assessment & Plan  Lab Results   Component Value Date    HGBA1C 7 0 (H) 05/14/2020       Recent Labs     08/21/20  1226   POCGLU 112       Blood Sugar Average: Last 72 hrs:  · Continue glipizide  · SSI + accuchek  · Diabetic diet    Suicidal ideation  Assessment & Plan  · Presented to the ED due to worsening anxiety and SI  · Further plan per psychiatry      VTE Prophylaxis: Reason for no pharmacologic prophylaxis low risk, ambulate  / reason for no mechanical VTE prophylaxis psychiatric unit, ambulate     Recommendations for Discharge:  · Follow up with PCP after discharge    Counseling / Coordination of Care Time: 30 minutes  Greater than 50% of total time spent on patient counseling and coordination of care  Collaboration of Care: Were Recommendations Directly Discussed with Primary Treatment Team? - No     History of Present Illness:    Naman Keenan is a 48 y o  female who is originally admitted to the psychiatry service due to anxiety, SI  We are consulted for medical clearance  Past medical history significant for bipolar disorder, essential HTN, type 2 DM, prior alcohol abuse    Patient states her anxiety has been "through the roof" and she needs something stronger  States that she has been having transient SI but none currently  Denies any chest pain/palpitations, shortness of breath, nausea/vomiting, abdominal pain  Review of Systems:    Review of Systems   Constitutional: Negative for chills, fatigue, fever and unexpected weight change  HENT: Negative for congestion, sore throat and trouble swallowing  Eyes: Negative for photophobia, pain and visual disturbance  Respiratory: Negative for cough, shortness of breath and wheezing  Cardiovascular: Negative for chest pain, palpitations and leg swelling  Gastrointestinal: Negative for abdominal pain, constipation, diarrhea, nausea and vomiting  Endocrine: Negative for polyuria  Genitourinary: Negative for difficulty urinating, dysuria, flank pain, hematuria and urgency  Musculoskeletal: Negative for back pain, myalgias, neck pain and neck stiffness  Skin: Negative for pallor and rash  Neurological: Negative for dizziness, tremors, syncope, weakness, light-headedness, numbness and headaches  Hematological: Does not bruise/bleed easily  Psychiatric/Behavioral: Positive for dysphoric mood and suicidal ideas  Negative for agitation and confusion  The patient is nervous/anxious          Past Medical and Surgical History:     Past Medical History:   Diagnosis Date    Alcohol abuse     Alcoholism ([facility] )     Anxiety     Bipolar disorder (HCC)     Bowel obstruction (HCC)     Depression     Gastritis     Head injury     Heart palpitations     Hyperlipidemia     Hypertension     Hypothyroidism     Psychiatric illness     PTSD (post-traumatic stress disorder)     [facility]     Sleep difficulties     Suicide attempt [facility])        Past Surgical History:   Procedure Laterality Date    ABDOMINAL SURGERY      APPENDECTOMY      BOWEL RESECTION      CHOLECYSTECTOMY      ESOPHAGOGASTRODUODENOSCOPY N/A 5/18/2018    Procedure: ESOPHAGOGASTRODUODENOSCOPY (EGD) with bx;  Surgeon: Wolf Chávez DO;  Location: [facility]; Service: Gastroenterology    HYSTERECTOMY      KNEE SURGERY Bilateral        Meds/Allergies:    all medications and allergies reviewed    Allergies: Allergies   Allergen Reactions    Latex Rash       Social History:     Marital Status:     Substance Use History:   Social History     Substance and Sexual Activity   Alcohol Use Yes    Alcohol/week: 6 0 standard drinks    Types: 6 Cans of beer per week    Frequency: 4 or more times a week    Drinks per session: 5 or 6    Binge frequency: Weekly    Comment: last drink on 08/15/20     Social History     Tobacco Use   Smoking Status Current Every Day Smoker    Packs/day: 0 50    Years: 25 00    Pack years: 12 50    Types: Cigarettes   Smokeless Tobacco Never Used     Social History     Substance and Sexual Activity   Drug Use No       Family History:    Family History   Problem Relation Age of Onset    Diabetes Father        Physical Exam:     Vitals:   Blood Pressure: 138/75 (08/22/20 0717)  Pulse: 84 (08/22/20 0717)  Temperature: (!) 97 2 °F (36 2 °C) (08/22/20 0717)  Temp Source: Tympanic (08/22/20 0717)  Respirations: 18 (08/22/20 0717)  Height: 5' 3" (160 cm) (08/22/20 0133)  Weight - Scale: 93 3 kg (205 lb 9 6 oz) (08/22/20 0133)  SpO2: 96 % (08/22/20 0133)    Physical Exam  Vitals signs and nursing note reviewed  Constitutional:       Appearance: Normal appearance  Comments: Appears comfortable, no acute distress   HENT:      Head: Normocephalic  Eyes:      General: No scleral icterus  Extraocular Movements: Extraocular movements intact  Conjunctiva/sclera: Conjunctivae normal    Neck:      Musculoskeletal: Normal range of motion  Cardiovascular:      Rate and Rhythm: Normal rate and regular rhythm  Heart sounds: S1 normal and S2 normal    Pulmonary:      Breath sounds: No wheezing, rhonchi or rales     Abdominal: General: Bowel sounds are normal       Palpations: Abdomen is soft  Tenderness: There is no abdominal tenderness  There is no guarding or rebound  Musculoskeletal:         General: No swelling, tenderness or deformity  Comments: Able to move upper/lower extremities bilaterally without difficulty  No edema   Skin:     General: Skin is warm and dry  Neurological:      Mental Status: She is alert and oriented to person, place, and time  Psychiatric:         Mood and Affect: Mood normal          Speech: Speech normal          Behavior: Behavior normal            Additional Data:     Lab Results: I have personally reviewed pertinent reports  Lab Results   Component Value Date/Time    HGBA1C 7 0 (H) 05/14/2020 05:36 AM    HGBA1C 5 4 11/15/2019 05:48 AM    HGBA1C 5 6 05/26/2019 06:26 AM    HGBA1C 5 2 09/01/2018 06:24 AM     Results from last 7 days   Lab Units 08/21/20  1226   POC GLUCOSE mg/dl 112           Imaging: I have personally reviewed pertinent reports  No orders to display       EKG, Pathology, and Other Studies Reviewed on Admission:   · Reviewed as above    ** Please Note: This note has been constructed using a voice recognition system   **

## 2020-08-22 NOTE — ASSESSMENT & PLAN NOTE
Lab Results   Component Value Date    HGBA1C 7 0 (H) 05/14/2020       Recent Labs     08/21/20  1226   POCGLU 112       Blood Sugar Average: Last 72 hrs:  · Continue glipizide  · SSI + accuchek  · Diabetic diet
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
## 2020-08-22 NOTE — TREATMENT PLAN
TREATMENT PLAN REVIEW - [address] 48 y o  1965 female MRN: [id number]    6 [address] [place] [address] Room / Bed: [facility] 260/[facility] 260-01 Encounter: [id number]          Admit Date/Time:  8/22/2020  1:30 AM    Treatment Team: Attending Provider: Christiane Houston MD; Patient Care Assistant: Edwin Jimenes; Patient Care Assistant: Lajune Carrel; Patient Care Technician: Angelica French; Registered Nurse: Corbin Castaneda RN; Security: Modesto Garcia;  Registered Nurse: Julio Holden, RN; Registered Nurse: Omar Rios RN    Diagnosis: Principal Problem:    Bipolar disorder [facility]  Active Problems:    Essential hypertension    Post-traumatic stress disorder, chronic    Generalized anxiety disorder    Alcohol abuse    Suicidal ideation    Medical clearance for psychiatric admission    Type 2 diabetes mellitus without complication, without long-term current use of insulin ([facility] )    Patient Strengths/Assets: cooperative, communication skills, compliant with medication, patient is on a voluntary commitment     Patient Barriers/Limitations: family conflict, financial instability, poor self-care    Short Term Goals: decrease in depressive symptoms, decrease in anxiety symptoms, decrease in suicidal thoughts    Long Term Goals: improvement in anxiety, resolution of depressive symptoms, stabilization of mood    Progress Towards Goals: starting psychiatric medications as prescribed    Recommended Treatment: medication management, patient medication education, group therapy, milieu therapy, continued Behavioral Health psychiatric evaluation/assessment process     Treatment Frequency: daily medication monitoring, group and milieu therapy daily, monitoring through interdisciplinary rounds, monitoring through weekly patient care conferences    Expected Discharge Date: 5 days - 8/27/2020    Discharge Plan: referral for outpatient medication management with a psychiatrist, referral for outpatient psychotherapy    Treatment Plan Created/Updated By: Ai Leal MD

## 2020-08-22 NOTE — PLAN OF CARE
Problem: Depression  Goal: Treatment Goal: Demonstrate behavioral control of depressive symptoms, verbalize feelings of improved mood/affect, and adopt new coping skills prior to discharge  Outcome: Progressing  Goal: Verbalize thoughts and feelings  Description: Interventions:  - Assess and re-assess patient's level of risk   - Engage patient in 1:1 interactions, daily, for a minimum of 15 minutes   - Encourage patient to express feelings, fears, frustrations, hopes   Outcome: Progressing     Problem: Anxiety  Goal: Anxiety is at manageable level  Description: Interventions:  - Assess and monitor patient's anxiety level  - Monitor for signs and symptoms (heart palpitations, chest pain, shortness of breath, headaches, nausea, feeling jumpy, restlessness, irritable, apprehensive)  - Collaborate with interdisciplinary team and initiate plan and interventions as ordered  - [place] patient to unit/surroundings  - Explain treatment plan  - Encourage participation in care  - Encourage verbalization of concerns/fears  - Identify coping mechanisms  - Assist in developing anxiety-reducing skills  - Administer/offer alternative therapies  - Limit or eliminate stimulants  Outcome: Progressing     Problem: SAFETY ADULT  Goal: Patient will remain free of falls  Description: INTERVENTIONS:  - Assess patient frequently for physical needs  -  Identify cognitive and physical deficits and behaviors that affect risk of falls    -  [place] fall precautions as indicated by assessment   - Educate patient/family on patient safety including physical limitations  - Instruct patient to call for assistance with activity based on assessment  - Modify environment to reduce risk of injury  - Consider OT/PT consult to assist with strengthening/mobility  Outcome: Progressing

## 2020-08-22 NOTE — PROGRESS NOTES
Pt with depressed affect, increased anxiety  Reports scheduled meds are not effective for anxiety, pt received medications <30minutes prior to interaction  Pt with slight tremor in Right hand  Encouraged to allow time for medications to take effect  Pt willing to go lie down in room until group time  States only having coffee with breakfast this morning and also refused lunch  Denies SI, HI, AVH currently

## 2020-08-22 NOTE — ED NOTES
RN assumed care of pt at this time  Pt is resting with no signs of distress        Nancy Jameson, MARK ANTHONY  08/21/20 2020

## 2020-08-23 LAB
GLUCOSE SERPL-MCNC: 111 MG/DL (ref 65–140)
GLUCOSE SERPL-MCNC: 122 MG/DL (ref 65–140)
GLUCOSE SERPL-MCNC: 157 MG/DL (ref 65–140)
GLUCOSE SERPL-MCNC: 210 MG/DL (ref 65–140)

## 2020-08-23 PROCEDURE — 99232 SBSQ HOSP IP/OBS MODERATE 35: CPT | Performed by: STUDENT IN AN ORGANIZED HEALTH CARE EDUCATION/TRAINING PROGRAM

## 2020-08-23 PROCEDURE — 82948 REAGENT STRIP/BLOOD GLUCOSE: CPT

## 2020-08-23 RX ORDER — CLONAZEPAM 1 MG/1
1 TABLET ORAL 3 TIMES DAILY
Status: DISCONTINUED | OUTPATIENT
Start: 2020-08-23 | End: 2020-08-28 | Stop reason: HOSPADM

## 2020-08-23 RX ADMIN — ACETAMINOPHEN 975 MG: 325 TABLET, FILM COATED ORAL at 12:34

## 2020-08-23 RX ADMIN — CLONAZEPAM 1 MG: 1 TABLET ORAL at 17:00

## 2020-08-23 RX ADMIN — GABAPENTIN 800 MG: 400 CAPSULE ORAL at 21:31

## 2020-08-23 RX ADMIN — SUCRALFATE 1 G: 1 TABLET ORAL at 21:31

## 2020-08-23 RX ADMIN — GABAPENTIN 800 MG: 400 CAPSULE ORAL at 08:43

## 2020-08-23 RX ADMIN — GABAPENTIN 800 MG: 400 CAPSULE ORAL at 17:00

## 2020-08-23 RX ADMIN — SUCRALFATE 1 G: 1 TABLET ORAL at 17:00

## 2020-08-23 RX ADMIN — SUCRALFATE 1 G: 1 TABLET ORAL at 06:51

## 2020-08-23 RX ADMIN — METOPROLOL TARTRATE 12.5 MG: 25 TABLET ORAL at 08:43

## 2020-08-23 RX ADMIN — SUCRALFATE 1 G: 1 TABLET ORAL at 12:35

## 2020-08-23 RX ADMIN — GLIPIZIDE 5 MG: 5 TABLET ORAL at 08:42

## 2020-08-23 RX ADMIN — BUSPIRONE HYDROCHLORIDE 20 MG: 10 TABLET ORAL at 08:43

## 2020-08-23 RX ADMIN — CLONAZEPAM 1 MG: 1 TABLET ORAL at 08:43

## 2020-08-23 RX ADMIN — GABAPENTIN 800 MG: 400 CAPSULE ORAL at 12:35

## 2020-08-23 RX ADMIN — CLONAZEPAM 1 MG: 1 TABLET ORAL at 21:31

## 2020-08-23 RX ADMIN — LITHIUM CARBONATE 450 MG: 300 CAPSULE, GELATIN COATED ORAL at 21:31

## 2020-08-23 RX ADMIN — LITHIUM CARBONATE 300 MG: 300 CAPSULE, GELATIN COATED ORAL at 08:42

## 2020-08-23 RX ADMIN — BUSPIRONE HYDROCHLORIDE 20 MG: 10 TABLET ORAL at 21:31

## 2020-08-23 RX ADMIN — INSULIN LISPRO 2 UNITS: 100 INJECTION, SOLUTION INTRAVENOUS; SUBCUTANEOUS at 21:35

## 2020-08-23 RX ADMIN — NICOTINE 1 PATCH: 14 PATCH TRANSDERMAL at 08:44

## 2020-08-23 RX ADMIN — BUSPIRONE HYDROCHLORIDE 20 MG: 10 TABLET ORAL at 17:00

## 2020-08-23 RX ADMIN — PANTOPRAZOLE SODIUM 40 MG: 40 TABLET, DELAYED RELEASE ORAL at 06:51

## 2020-08-23 RX ADMIN — AMLODIPINE BESYLATE 5 MG: 5 TABLET ORAL at 08:42

## 2020-08-23 RX ADMIN — METOPROLOL TARTRATE 12.5 MG: 25 TABLET ORAL at 21:31

## 2020-08-23 NOTE — PLAN OF CARE
Problem: Depression  Goal: Treatment Goal: Demonstrate behavioral control of depressive symptoms, verbalize feelings of improved mood/affect, and adopt new coping skills prior to discharge  Outcome: Progressing  Goal: Verbalize thoughts and feelings  Description: Interventions:  - Assess and re-assess patient's level of risk   - Engage patient in 1:1 interactions, daily, for a minimum of 15 minutes   - Encourage patient to express feelings, fears, frustrations, hopes   Outcome: Progressing     Problem: Anxiety  Goal: Anxiety is at manageable level  Description: Interventions:  - Assess and monitor patient's anxiety level  - Monitor for signs and symptoms (heart palpitations, chest pain, shortness of breath, headaches, nausea, feeling jumpy, restlessness, irritable, apprehensive)  - Collaborate with interdisciplinary team and initiate plan and interventions as ordered  - [place] patient to unit/surroundings  - Explain treatment plan  - Encourage participation in care  - Encourage verbalization of concerns/fears  - Identify coping mechanisms  - Assist in developing anxiety-reducing skills  - Administer/offer alternative therapies  - Limit or eliminate stimulants  Outcome: Progressing     Problem: SAFETY ADULT  Goal: Patient will remain free of falls  Description: INTERVENTIONS:  - Assess patient frequently for physical needs  -  Identify cognitive and physical deficits and behaviors that affect risk of falls    -  [place] fall precautions as indicated by assessment   - Educate patient/family on patient safety including physical limitations  - Instruct patient to call for assistance with activity based on assessment  - Modify environment to reduce risk of injury  - Consider OT/PT consult to assist with strengthening/mobility  Outcome: Progressing

## 2020-08-23 NOTE — QUICK NOTE
MD ordered a one-time dose of lorazepam-1mg  Medication acknowledged by writer and verified by pharmacy  Accu-dose only had PRN lorazepam-0 5mg available to pull  Writer pulled (2) lorazepam-0 5mg tablets to total 1 0mg and administered to medication to Pt  After medication administration, Accu-dose was showing a waste reminder  Pharmacy was notified and writer was informed to provide progress note indicate what had occurred  No medication error, no medication to waste

## 2020-08-23 NOTE — PROGRESS NOTES
Pt with depressed affect, tearful during interaction  Reports increased anxiety, racing heart  HR: 120, BP: 132/86  Pt had just received scheduled clonazepam prior to this  Encouraged pt to lie down and rest  Pt states anxiety starts from "moment I wake up " Pt then talking about family members and their mental health  Encouraged to focus on own treatment while inpatient  Admits to taking Tylenol PM regularly throughout day PTA to just sleep, because anxiety was too much   Discussed coping skills in and out of hospital  Denies SI, HI, AVH

## 2020-08-23 NOTE — PROGRESS NOTES
Pt in room resting with depressed flat affect  Currently denies SI/HI and AVH  Reports being tired, scant in conversation  CIWA score 3  Pt had no questions, uninterested in continuing with conversation  Will continue to monitor throughout the night

## 2020-08-23 NOTE — PROGRESS NOTES
Patient tearful after dinner and expressed increased anxiety to writer during med pass  Pt felt hopeless in that moment, expressing "I do not know why this happens, I was fine, then I ate and I was shaking and my heart started racing and I felt like I was panicking  Writer talked to pt about coping skills and encouraged pt to reach out to staff if she needs extra support  Pt agreeable to do so and hours after taking scheduled 1600 meds, pt was OOB in dayroom, social with peers and appearing less anxious and tearful  Pt did verbalize to writer "I am a fighter, I have suicidal thoughts but I won't commit suicide, because I cannot leave my kids "  Pt also expressed wanting to remain on BHU until feeling less anxious and more stable  Writer assured pt that staff would support her and make sure she felt ready to discharge before leaving  Pt reports interest in taking steps to better herself, and is attending wrap up group tonight  Will continue to monitor

## 2020-08-23 NOTE — PROGRESS NOTES
Progress Note - [address] 48 y o  female MRN: [id number]  Unit/Bed#: [facility] 260-01 Encounter: [id number]    Subjective:    Per nursing, patient resting in room, flat affect, feeling tired, tearful during interaction  Per patient, patient reports feeling a bit better than yesterday but still very anxious  She reports that she woke up feeling very anxious, with "heart beating out of my chest "  Patient reports that she slept well last night  Patient reports that her appetite has been low  Patient describes her mood as "sad "  Patient denies any passive or active suicidal ideation, intent, or plan  She reports hoping to feel better  She rates her anxiety 9/10 intensity  Patient denies any auditory or visual hallucinations  She reports staff is treating her okay  Behavior over the last 24 hours:  improved  Medication side effects: No  ROS: chest tightness    Objective:    Temp:  [97 2 °F (36 2 °C)-98 4 °F (36 9 °C)] 97 6 °F (36 4 °C)  HR:  [] 120  Resp:  [16-20] 20  BP: (105-132)/(61-86) 132/86    Mental Status Evaluation:  Appearance:  Sitting anxiously in chair, tearful, dressed in hospital gown, cooperative   Behavior:  No tics, tremors, or behaviors observed   Speech:  Normal rate, rhythm, and volume   Mood:  "sad"   Affect:  Appears constricted in depressed range, stable, mood-congruent   Thought Process:  Linear and goal directed   Associations intact associations   Thought Content:  No passive or active suicidal or homicidal ideation, intent, or plan  Perceptual Disturbances: Denies any auditory or visual hallucinations   Sensorium:  Oriented to person, place, time, and situation   Memory:  recent and remote memory grossly intact   Consciousness:  alert and awake   Attention: attention span and concentration were age appropriate   Insight:  Limited   Judgment: fair   Gait/Station: normal gait/station   Motor Activity: no abnormal movements       Labs:    I have personally reviewed all pertinent laboratory/tests results  Labs in last 72 hours:   Recent Labs     08/21/20  0938 08/22/20  0556   WBC  --  8 71   RBC  --  4 23   HGB  --  13 6   HCT  --  41 5   PLT  --  322   RDW  --  14 0   NEUTROABS  --  5 14   SODIUM  --  136   K  --  3 8   CL  --  101   CO2  --  26   BUN  --  10   CREATININE  --  0 78   GLUC  --  116   CALCIUM  --  8 3   AST  --  24   ALT  --  64   ALKPHOS  --  99   TP  --  6 8   ALB  --  3 4*   TBILI  --  0 70   LITHIUM 0 8  --    [id number]  --  6 457*   FREET4  --  1 10   PREGSERUM  --  Negative       Progress Toward Goals: Progressing    Recommended Treatment: Continue with group therapy, milieu therapy and occupational therapy  Risks, benefits and possible side effects of Medications:   Risks, benefits, and possible side effects of medications explained to patient and patient verbalizes understanding  Medications: all current active meds have been reviewed    Current Facility-Administered Medications   Medication Dose Route Frequency Provider Last Rate    acetaminophen  325 mg Oral Q6H PRN Dina Quezada MD      acetaminophen  650 mg Oral Q4H PRN Dina Quezada MD      acetaminophen  975 mg Oral Q6H PRN Dina Quezada MD      aluminum-magnesium hydroxide-simethicone  30 mL Oral Q4H PRN Dina Quezada MD      amLODIPine  5 mg Oral Daily Dina Quezada MD      benztropine  1 mg Intramuscular Q6H PRN Dina Quezada MD      benztropine  1 mg Oral Q6H PRN Dina Quezada MD      busPIRone  20 mg Oral TID Dina Quezada MD      clonazePAM  1 mg Oral BID Dina Quezada MD      gabapentin  800 mg Oral 4x Daily Dina Quezada MD      glipiZIDE  5 mg Oral Daily Before Breakfast Dina Quezada MD      haloperidol  5 mg Oral Q6H PRN Dina Quezada MD      haloperidol lactate  5 mg Intramuscular Q6H PRN Dina Quezada MD      hydrOXYzine HCL  50 mg Oral Q6H PRN Dina Quezada MD      insulin lispro  1-6 Units Subcutaneous TID [place] MD      insulin lispro  1-6 Units Subcutaneous HS Pauline Martinez MD      lithium carbonate  300 mg Oral Daily Pauline Martinez MD      lithium carbonate  450 mg Oral HS Pauline Martinez MD      LORazepam  2 mg Intramuscular Q6H PRN Pauline Martinez MD      magnesium hydroxide  30 mL Oral Daily PRN Pauline Martinez MD      metoprolol tartrate  12 5 mg Oral Q12H [facility] Pauline Martinez MD      nicotine  1 patch Transdermal Daily Pauline Martinez MD      nicotine polacrilex  2 mg Oral Q2H PRN Pauline Martinez MD      OLANZapine  10 mg Intramuscular Q8H PRN Pauline Martinez MD      OLANZapine  10 mg Oral Q8H PRN Pauline Martinez MD      pantoprazole  40 mg Oral Early Morning Pauline Martinez MD      risperiDONE  1 mg Oral Q3H PRN Pauline Martinez MD      sucralfate  1 g Oral 4x Daily (AC & HS) Pauline Martinez MD      traZODone  50 mg Oral HS PRN Pauline Martinez MD             Assessment/Plan   Principal Problem:    Bipolar disorder [facility])  Active Problems:    Essential hypertension    Post-traumatic stress disorder, chronic    Generalized anxiety disorder    Alcohol abuse    Suicidal ideation    Medical clearance for psychiatric admission    Type 2 diabetes mellitus without complication, without long-term current use of insulin ([facility] [address] )    47 y/o Female with bipolar disorder, PTSD, JENNIFER- continues to have disabling anxiety symptoms, significant depression secondary to her anxiety, no current SI    Plan:  -Will titrate Klonopin to 1 mg tid  -Would consider starting SSRI, will defer to primary team  -Serum Lithium level of 0 8 on admission

## 2020-08-23 NOTE — TREATMENT TEAM
Daily Rounds:    Pt admitted on 201 from [facility], SI with plan to OD on meds  Recent inpt here in July  Reports elevated anxiety at home  Disheveled, flat affect on admit  Diabetic  Hx seizures  BAT 0 021

## 2020-08-24 LAB
GLUCOSE SERPL-MCNC: 112 MG/DL (ref 65–140)
GLUCOSE SERPL-MCNC: 144 MG/DL (ref 65–140)
GLUCOSE SERPL-MCNC: 145 MG/DL (ref 65–140)
GLUCOSE SERPL-MCNC: 194 MG/DL (ref 65–140)

## 2020-08-24 PROCEDURE — 82948 REAGENT STRIP/BLOOD GLUCOSE: CPT

## 2020-08-24 PROCEDURE — 99232 SBSQ HOSP IP/OBS MODERATE 35: CPT | Performed by: PSYCHIATRY & NEUROLOGY

## 2020-08-24 RX ADMIN — LITHIUM CARBONATE 450 MG: 300 CAPSULE, GELATIN COATED ORAL at 21:31

## 2020-08-24 RX ADMIN — GABAPENTIN 800 MG: 400 CAPSULE ORAL at 21:31

## 2020-08-24 RX ADMIN — CLONAZEPAM 1 MG: 1 TABLET ORAL at 08:46

## 2020-08-24 RX ADMIN — METOPROLOL TARTRATE 12.5 MG: 25 TABLET ORAL at 21:30

## 2020-08-24 RX ADMIN — GLIPIZIDE 5 MG: 5 TABLET ORAL at 08:47

## 2020-08-24 RX ADMIN — SUCRALFATE 1 G: 1 TABLET ORAL at 06:44

## 2020-08-24 RX ADMIN — GABAPENTIN 800 MG: 400 CAPSULE ORAL at 08:47

## 2020-08-24 RX ADMIN — ACETAMINOPHEN 975 MG: 325 TABLET, FILM COATED ORAL at 22:45

## 2020-08-24 RX ADMIN — SUCRALFATE 1 G: 1 TABLET ORAL at 12:00

## 2020-08-24 RX ADMIN — GABAPENTIN 800 MG: 400 CAPSULE ORAL at 12:00

## 2020-08-24 RX ADMIN — PANTOPRAZOLE SODIUM 40 MG: 40 TABLET, DELAYED RELEASE ORAL at 06:44

## 2020-08-24 RX ADMIN — CLONAZEPAM 1 MG: 1 TABLET ORAL at 16:00

## 2020-08-24 RX ADMIN — SUCRALFATE 1 G: 1 TABLET ORAL at 16:00

## 2020-08-24 RX ADMIN — BUSPIRONE HYDROCHLORIDE 20 MG: 10 TABLET ORAL at 08:47

## 2020-08-24 RX ADMIN — NICOTINE 1 PATCH: 14 PATCH TRANSDERMAL at 08:50

## 2020-08-24 RX ADMIN — BUSPIRONE HYDROCHLORIDE 20 MG: 10 TABLET ORAL at 16:00

## 2020-08-24 RX ADMIN — GABAPENTIN 800 MG: 400 CAPSULE ORAL at 17:35

## 2020-08-24 RX ADMIN — AMLODIPINE BESYLATE 5 MG: 5 TABLET ORAL at 08:47

## 2020-08-24 RX ADMIN — LITHIUM CARBONATE 300 MG: 300 CAPSULE, GELATIN COATED ORAL at 08:47

## 2020-08-24 RX ADMIN — BUSPIRONE HYDROCHLORIDE 20 MG: 10 TABLET ORAL at 21:31

## 2020-08-24 RX ADMIN — SUCRALFATE 1 G: 1 TABLET ORAL at 21:31

## 2020-08-24 RX ADMIN — TRAZODONE HYDROCHLORIDE 50 MG: 50 TABLET ORAL at 23:24

## 2020-08-24 RX ADMIN — METOPROLOL TARTRATE 12.5 MG: 25 TABLET ORAL at 08:47

## 2020-08-24 RX ADMIN — CLONAZEPAM 1 MG: 1 TABLET ORAL at 21:30

## 2020-08-24 NOTE — PROGRESS NOTES
Present in milieu, scant conversation  Denies SI/HI  Compliant with meals and medications  Improved depression and anxiety, believes she is able to manage her anxiety better at this time than she was prior to admission  Provided education on diabetes and nutrition

## 2020-08-24 NOTE — PROGRESS NOTES
08/24/20 1115 08/24/20 1130 08/24/20 1315   Activity/Group Checklist   Group Admission/Discharge  (admission self assessment) Nursing Education  (safety plan) Life Skills  (calming strategies )   Attendance Attended Attended Attended   Attendance Duration (min) 16-30 16-30 46-60   Interactions Interacted appropriately Interacted appropriately Other (Comment)  (engaged in theraputic activity, interacted with staff )   Affect/Mood Blunted/flat Appropriate Blunted/flat;Constricted   Goals Achieved Identified feelings; Identified triggers; Discussed coping strategies; Able to listen to others; Able to engage in interactions; Able to reflect/comment on own behavior;Able to self-disclose  --  Identified feelings; Identified triggers; Discussed coping strategies; Identified resources and support systems; Able to listen to others; Able to reflect/comment on own behavior;Able to engage in interactions   [place] attended 3/3 groups today  She presents with a flat affect, is constricted, and only interacts when prompted  She will participate in the therapeutic activity

## 2020-08-24 NOTE — PROGRESS NOTES
08/24/20 1525   Team Meeting   Meeting Type Tx Team Meeting   Initial Conference Date 08/24/20   Next Conference Date 09/23/20   Team Members Present   Team Members Present Physician;Nurse;   Physician Team Member Dr Aron Munoz Team Member Jalyn Figueroa, RN   Social Work Team Member CHRIS Sharp   Patient/Family Present   Patient Present Yes   Patient's Family Present No     Initial Conference    Team and pt met to review pt strengths, limitations, coping skills, goals and tx plan;  pt agreeable and signed; copy on chart

## 2020-08-24 NOTE — PROGRESS NOTES
Pt in the dayroom watching show with peers  Affect is flat, however, reports "feeling better today" and verbalizes that "the medicine is helping"  Pt reports depression 2/10 and anxiety 3/10  Pt denies SI/HI/AVH and contracts for safety  Pt denies any questions or concerns at the present time
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
## 2020-08-24 NOTE — DISCHARGE INSTR - OTHER ORDERS
You are being discharged to your home at [facility], [address] [place] [address]; Phone: [phone]    Triggers you have identified during your hospitalization that led to your admission include son's recent cancer diagnosis and uncontrolled anxiety  You were unable to identify any coping skills you have identified during your hospitalization  If you are unable to deal with your distressed mood alone please speak to one of your sons or talk to your neighbor, whom you identified as supportive  If that is not effective and you continue to have distressed mood, please contact [address] [place] [address] at [phone], dial [phone] or go to the nearest emergency center  *National Suicide Prevention Lifeline:  0-156.702.7617  *[place] on Mental Illness ([place] HELPLINE: 729.682.7736/Website: www annabella org  *Substance Abuse and Mental Health Services Administration(SAMHSA) [employer], which is a confidential, free, 24-hour-a-day, 365-day-a-year, information service for individuals and family members facing mental health and/or substance use disorders  This service provides referrals to local treatment facilities, support groups, and community-based organizations  Callers can also order free publications and other information  Call 5-130.960.7972/Website: www [facility] gov  *[facility] 2-1-1: This is a toll free, confidential, 24-hour-a-day service which connects you to a community  in your area who can help you find services and resources that are available to you locally and provide critical services that can improve and save lives  Call: 80  /Website: https://altonTheTakehelton net/    What you need to know aboutcoronavirus disease 2019 (COVID-19)     What is coronavirus disease 2019 (COVID-19)? Coronavirus disease 2019 (COVID-19) is a respiratory illness that can spread from person to person   The virus that causes COVID-19 is a novel coronavirus that was first identified during an investigation into an outbreak in [place], [place]  Can people in [place]  get COVID-19? Yes  COVID-19 is spreading from person to person in parts of the [place]  Risk of infection with COVID-19 is higher for people who are close contacts of someone known to have COVID-19, for example healthcare workers, or household members  Other people at higher risk for infection are those who live in or have recently been in an area with ongoing spread of COVID-19  Learn more about places with ongoing spread at   [facility]  html#geographic  Have there been cases of COVID-19 in the [place] ?   Yes  The first case of COVID-19 in the [place] was reported on January 21, 2020  The current count of cases of COVID-19 in the [place] is available on [employer] at [facility]  How does COVID-19 spread? The virus that causes COVID-19 probably emerged from an animal source, but is now spreading from person to person  The virus is thought to spread mainly between people who are in close contact with one another (within about 6 feet) through respiratory droplets produced when an infected person coughs or sneezes  It also may be possible that a person can get COVID-19 by touching a surface or object that has the virus on it and then touching their own mouth, nose, or possibly their eyes, but this is not thought to be the main way the virus spreads  Learn what is known about the spread of newly emerged coronaviruses at [facility]  What are the symptoms of COVID-19? Patients with COVID-19 have had mild to severe respiratory illness with symptoms of   fever   cough   shortness of breath  What are severe complications from this virus? Some patients have pneumonia in both lungs, multi-organ failure and in some cases death  How can I help protect myself?    People can help protect themselves from respiratory illness with everyday preventive actions  Avoid close contact with people who are sick  Avoid touching your eyes, nose, and mouth withunwashed hands  Wash your hands often with soap and water for at least 20 seconds  Use an alcohol-based hand  that contains at least 60% alcohol if soap and water are not available  If you are sick, to keep from spreading respiratory illness to others, you should   Stay home when you are sick  Cover your cough or sneeze with a tissue, then throw the tissue in the trash  Clean and disinfect frequently touched objectsand surfaces  What should I do if I recently traveled from an area with ongoing spread of COVID-19? If you have traveled from an affected area, there may be restrictions on your movements for up to 2 weeks  If you develop symptoms during that period (fever, cough, trouble breathing), seek medical advice  Call the office of your health care provider before you go, and tell them about your travel and your symptoms  They will give you instructions on how to get care without exposing other people to your illness  While sick, avoid contact with people, don't go out and delay any travel to reduce the possibility of spreading illness to others  Is there a vaccine? There is currently no vaccine to protect against COVID-19  The best way to prevent infection is to take everyday preventive actions, like avoiding close contact with people who are sick and washing your hands often  Is there a treatment? There is no specific antiviral treatment for COVID-19  People with COVID-19 can seek medical care to helprelieve symptoms    For more information: www cdc gov/DTFAB12QR 056181-H 03/03/2020       What to do if you are sick withcoronavirus disease 2019 (COVID-19)     If you are sick with COVID-19 or suspect you are infected with the virus that causes COVID-19, follow the steps below to help prevent the disease from spreading to people in your home and community  Stay home except to get medical care   You should restrict activities outside your home, except for getting medical care  Do not go to work, school, or public areas  Avoid using public transportation, ride-sharing, or taxis  Separate yourself from other people and animals inyour home  People: As much as possible, you should stay in a specific room and away from other people in your home  Also, you should use a separate bathroom, if available  Animals: Do not handle pets or other animals while sick  See COVID-19 and Animals for more information  Call ahead before visiting your doctor   If you have a medical appointment, call the healthcare provider and tell them that you have or may have COVID-19  This will help the healthcare provider's office take steps to keep other people from getting infected or exposed  Wear a facemask  You should wear a facemask when you are around other people (e g , sharing a room or vehicle) or pets and before you enter a healthcare provider's office  If you are not able to wear a facemask (for example, because it causes trouble breathing), then people who live with you should not stay in the same room with you, or they should wear a facemask if they enteryour room  Cover your coughs and sneezes   Cover your mouth and nose with a tissue when you cough or sneeze  Throw used tissues in a lined trash can; immediately wash your hands with soap and water for at least 20 seconds or clean your hands with an alcohol-based hand  that contains at least 60 to 95% alcohol, covering all surfaces of your hands and rubbing them together until they feel dry  Soap and water should be used preferentially if hands are visibly dirty  Avoid sharing personal household items   You should not share dishes, drinking glasses, cups, eating utensils, towels, or bedding with other people or pets in your home   After using these items, they should be washed thoroughly with soap and water  Clean your hands often  Wash your hands often with soap and water for at least 20 seconds  If soap and water are not available, clean your hands with an alcohol-based hand  that contains at least 60% alcohol, covering all surfaces of your hands and rubbing them together until they feel dry  Soap and water should be used preferentially if hands are visibly dirty  Avoid touching your eyes, nose, and mouth with unwashed hands  Clean all "high-touch" surfaces every day  High touch surfaces include counters, tabletops, doorknobs, bathroom fixtures, toilets, phones, keyboards, tablets, and bedside tables  Also, clean any surfaces that may have blood, stool, or body fluids on them  Use a household cleaning spray or wipe, according to the label instructions  Labels contain instructions for safe and effective use of the cleaning product including precautions you should take when applying the product, such as wearing gloves and making sure you have good ventilation during use of the product  Monitor your symptoms  Seek prompt medical attention if your illness is worsening (e g , difficulty breathing)  Before seeking care, call your healthcare provider and tell them that you have, or are being evaluated for, COVID-19  Put on a facemask before you enter the facility  These steps will help the healthcare provider's office to keep other people in the office or waiting room from getting infectedor exposed  Ask your healthcare provider to call the local or [facility] health department  Persons who are placed under active monitoring or facilitated self-monitoring should follow instructions provided by their local health department or occupational health professionals, as appropriate  If you have a medical emergency and need to call 911, notify the dispatch personnel that you have, or are being evaluated for COVID-19   If possible, put on a facemask before emergency medical services arrive  Discontinuing home isolation  Patients with confirmed COVID-19 should remain under home isolation precautions until the risk of secondary transmission to others is thought to be low  The decision to discontinue home isolation precautions should be made on a case-by-case basis, in consultation with healthcare providers and state and local health departments  For more information: www cdc gov/EELJJ64HM 650379-A 02/24/2020       Stay home when you are sick,except to get medical care  Wash your hands often with soap and water for at least 20 seconds  Cover your cough or sneeze with a tissue, then throw the tissue in the trash  Clean and disinfect frequently touched objects and surfaces  Avoid touching your eyes, nose, and mouth  STOP THE SPREAD OF GERMS  For more information: www cdc gov/COVID19 Avoid close contact with people who are sick  Help prevent the spread of respiratory diseases like COVID-19

## 2020-08-24 NOTE — PLAN OF CARE
Problem: Depression  Goal: Treatment Goal: Demonstrate behavioral control of depressive symptoms, verbalize feelings of improved mood/affect, and adopt new coping skills prior to discharge  Outcome: Progressing  Goal: Verbalize thoughts and feelings  Description: Interventions:  - Assess and re-assess patient's level of risk   - Engage patient in 1:1 interactions, daily, for a minimum of 15 minutes   - Encourage patient to express feelings, fears, frustrations, hopes   Outcome: Progressing     Problem: Anxiety  Goal: Anxiety is at manageable level  Description: Interventions:  - Assess and monitor patient's anxiety level  - Monitor for signs and symptoms (heart palpitations, chest pain, shortness of breath, headaches, nausea, feeling jumpy, restlessness, irritable, apprehensive)  - Collaborate with interdisciplinary team and initiate plan and interventions as ordered  - [place] patient to unit/surroundings  - Explain treatment plan  - Encourage participation in care  - Encourage verbalization of concerns/fears  - Identify coping mechanisms  - Assist in developing anxiety-reducing skills  - Administer/offer alternative therapies  - Limit or eliminate stimulants  Outcome: Progressing     Problem: SAFETY ADULT  Goal: Patient will remain free of falls  Description: INTERVENTIONS:  - Assess patient frequently for physical needs  -  Identify cognitive and physical deficits and behaviors that affect risk of falls    -  [place] fall precautions as indicated by assessment   - Educate patient/family on patient safety including physical limitations  - Instruct patient to call for assistance with activity based on assessment  - Modify environment to reduce risk of injury  - Consider OT/PT consult to assist with strengthening/mobility  Outcome: Progressing

## 2020-08-24 NOTE — DISCHARGE INSTR - APPOINTMENTS
The treatment team recommends ongoing medication management upon discharge  A referral has been made on your behalf to [address] [address], [address], [place], [facility] Phone: [phone]  Your intake appointment is scheduled for Thursday, Sept 25th at 1pm IN-PERSON  Please plan to arrive 15 minutes prior to your scheduled appointment and bring your insurance card(s), photo ID  You MUST wear a mask for this appointment  You will receive an intake packet in the mail; please complete this and bring with you to your initial appointment  Your discharge will be faxed to this provider for continuity of care  You have identified Harmony Niko, [address] [facility], [address], [place], [address], Phone: [phone] as your primary care provider  An appointment has been scheduled on your behalf for follow-up on Thurs, Sept 3rd at [address] [facility] IN-PERSON  Please plan to arrive 15 minutes prior to your scheduled appointment and bring your photo ID/Insurance Card(s)  You MUST wear a mask to this appointment  Your discharge will be faxed to this provider for continuity of care  A referral for blended case management services was made on your behalf on July 31st to [facility] and [address], [phone]  Your assigned  is Ralph Bashir, [phone]  He will contact you directly within 3-5 business days after discharge to follow up  Your discharge will be faxed to this provider for continuity of care  Kathy Rebolledo RN, our [employer], will be calling you after your discharge, on the phone number that you provided  She will be available as an additional support, if needed  If you wish to speak with her, you may contact Sidney Leyva at [phone]  Due to being admitted positive for alcohol use, the treatment team recommends ongoing substance abuse  upon discharge    You declined referrals for inpatient and outpatient services at this time  If you wish to seek this service in the future, please call one of the following providers:   [address] [facility]:  If you or someone you know has a drug or alcohol problem, there is help:  [address]: [address]: [phone]  An assessment is the first step  In addition to those listed there are other programs available in the area but assessment is best to determine an appropriate level of care  If you DO NOT have Medical Assistance ([place]) or [employer], an assessment can be scheduled at one of these providers:  [address] [place] [address] [place] Kenisha [address], [phone]   [address] [facility] [address] , [facility], [address] [facility] [address]   [facility] [place], [address]   Step by [address] [facility] [address] , [place], [address] [facility]  [phone]   [facility] T[facility]  [place]  [address] [facility], [place] , [place], [address] [facility]  [address] [facility],[address] , [address], [place], [address]

## 2020-08-24 NOTE — PROGRESS NOTES
Progress Note - Behavioral Health   Wei Crowder 48 y o  female MRN: [id number]  Unit/Bed#: [facility] 260-01 Encounter: [id number]    Assessment/Plan   Principal Problem:    Bipolar disorder ([address] )  Active Problems:    Generalized anxiety disorder    Essential hypertension    Post-traumatic stress disorder, chronic    Alcohol abuse    Suicidal ideation    Medical clearance for psychiatric admission    Type 2 diabetes mellitus without complication, without long-term current use of insulin ([facility])      Subjective:  Pt seen and cooperative, but still very anxious and labile  I just have such high anxiety and panic  Says she wakes up like that  She says that she was able to eat today better  Reports not sleeping well last night  The acuity of unit may play part of that  Pt says that she had been taking more than supposed to of medications  Pt says she didn't go to outpt appointment because son had appointments on same day  Pt looks sad, depressed and anxious  She denies SI/HI/AH/VH at this time but with debilitating anxiety and panic               Current Medications:  Current Facility-Administered Medications   Medication Dose Route Frequency    acetaminophen (TYLENOL) tablet 325 mg  325 mg Oral Q6H PRN    acetaminophen (TYLENOL) tablet 650 mg  650 mg Oral Q4H PRN    acetaminophen (TYLENOL) tablet 975 mg  975 mg Oral Q6H PRN    aluminum-magnesium hydroxide-simethicone (MYLANTA) 200-200-20 mg/5 mL oral suspension 30 mL  30 mL Oral Q4H PRN    amLODIPine (NORVASC) tablet 5 mg  5 mg Oral Daily    benztropine (COGENTIN) injection 1 mg  1 mg Intramuscular Q6H PRN    benztropine (COGENTIN) tablet 1 mg  1 mg Oral Q6H PRN    busPIRone (BUSPAR) tablet 20 mg  20 mg Oral TID    clonazePAM (KlonoPIN) tablet 1 mg  1 mg Oral TID    gabapentin (NEURONTIN) capsule 800 mg  800 mg Oral 4x Daily    glipiZIDE (GLUCOTROL) tablet 5 mg  5 mg Oral Daily Before Breakfast    haloperidol (HALDOL) tablet 5 mg  5 mg Oral Q6H PRN    haloperidol lactate (HALDOL) injection 5 mg  5 mg Intramuscular Q6H PRN    hydrOXYzine HCL (ATARAX) tablet 50 mg  50 mg Oral Q6H PRN    insulin lispro (HumaLOG) 100 units/mL subcutaneous injection 1-6 Units  1-6 Units Subcutaneous TID AC    insulin lispro (HumaLOG) 100 units/mL subcutaneous injection 1-6 Units  1-6 Units Subcutaneous HS    lithium carbonate capsule 300 mg  300 mg Oral Daily    lithium carbonate capsule 450 mg  450 mg Oral HS    LORazepam (ATIVAN) injection 2 mg  2 mg Intramuscular Q6H PRN    magnesium hydroxide (MILK OF MAGNESIA) 400 mg/5 mL oral suspension 30 mL  30 mL Oral Daily PRN    metoprolol tartrate (LOPRESSOR) partial tablet 12 5 mg  12 5 mg Oral Q12H [facility]    nicotine (NICODERM CQ) 14 mg/24hr TD 24 hr patch 1 patch  1 patch Transdermal Daily    nicotine polacrilex (NICORETTE) gum 2 mg  2 mg Oral Q2H PRN    OLANZapine (ZyPREXA) IM injection 10 mg  10 mg Intramuscular Q8H PRN    OLANZapine (ZyPREXA) tablet 10 mg  10 mg Oral Q8H PRN    pantoprazole (PROTONIX) EC tablet 40 mg  40 mg Oral Early Morning    risperiDONE (RisperDAL M-TABS) dispersible tablet 1 mg  1 mg Oral Q3H PRN    sucralfate (CARAFATE) tablet 1 g  1 g Oral 4x Daily (AC & HS)    traZODone (DESYREL) tablet 50 mg  50 mg Oral HS PRN       Behavioral Health Medications: all current active meds have been reviewed and continue current psychiatric medications  Vitals:  Vitals:    08/24/20 1059   BP: 133/77   Pulse: 104   Resp: 17   Temp: 98 3 °F (36 8 °C)   SpO2:        Laboratory results:    I have personally reviewed all pertinent laboratory/tests results    Most Recent Labs:   Lab Results   Component Value Date    WBC 8 71 08/22/2020    RBC 4 23 08/22/2020    HGB 13 6 08/22/2020    HCT 41 5 08/22/2020     08/22/2020    RDW 14 0 08/22/2020    NEUTROABS 5 14 08/22/2020    SODIUM 136 08/22/2020    K 3 8 08/22/2020     08/22/2020    CO2 26 08/22/2020    BUN 10 08/22/2020    CREATININE 0 78 08/22/2020    GLUC 116 08/22/2020    GLUF 119 (H) 12/18/2019    CALCIUM 8 3 08/22/2020    AST 24 08/22/2020    ALT 64 08/22/2020    ALKPHOS 99 08/22/2020    TP 6 8 08/22/2020    ALB 3 4 (L) 08/22/2020    TBILI 0 70 08/22/2020    CHOLESTEROL 237 (H) 07/28/2020    HDL 40 07/28/2020    TRIG 287 (H) 07/28/2020    LDLCALC 140 (H) 07/28/2020    NONHDLC 197 07/28/2020    LITHIUM 0 8 08/21/2020    AMMONIA <9 (L) 02/10/2020    IUM0FWFEMRCE 6 457 (H) 08/22/2020    FREET4 1 10 08/22/2020    PREGSERUM Negative 08/22/2020    RPR Non-Reactive 07/28/2020    HGBA1C 7 0 (H) 05/14/2020     05/14/2020       Psychiatric Review of Systems:  Behavior over the last 24 hours:  improving  Sleep: poor  Appetite: started eating more today  Medication side effects: No  ROS: no complaints, all others negative    Mental Status Evaluation:  Appearance:  disheveled and hospital attire   Behavior:  cooperative, sad, anxious   Speech:  normal pitch and normal volume   Mood:  anxious and depressed   Affect:  constricted and labile   Thought Process:  goal directed   Thought Content:  negative in thoughts;  hopelessness   Perceptual Disturbances: None   Risk Potential: Suicidal Ideations none, Homicidal Ideations none and Potential for Aggression No   Sensorium:  person and place   Cognition:  recent and remote memory grossly intact   Consciousness:  alert and awake    Attention: attention span appeared shorter than expected for age   Insight:  limited   Judgment: fair   Gait/Station: normal gait/station and normal balance   Motor Activity: no abnormal movements     Progress Toward Goals: progressing    Recommended Treatment: Continue with group therapy, milieu therapy and occupational therapy  1  Continue current meds and treatments    Risks, benefits and possible side effects of Medications:   Risks, benefits, and possible side effects of medications explained to patient and patient verbalizes understanding     Risks of medications in pregnancy explained if female patient  Patient verbalizes understanding and agrees to notify her doctor if she becomes pregnant

## 2020-08-24 NOTE — PROGRESS NOTES
08/24/20 0943   Team Meeting   Meeting Type Daily Rounds   Team Members Present   Team Members Present Physician;Nurse;; Occupational Therapist   Physician Team Member Dr Santy Zheng MD; Dr Tayler Calero MD; Antonio Saldana PA-C   Nursing Team Member Sparkle Cabrera RN   Care Management Team Member Leonela De Paz; Dileep Service   OT Team Member Mir Hayes   Patient/Family Present   Patient Present No   Patient's Family Present No     This patient is a 30 day readmission and was most recently discharged on: 8/3/2020    The previous discharge plan was: home with outpt services; scheduled at [facility] 8/4/2020 and referral to [facility] [address] for [facility] services     The [address] Readmission Risk score is: 27    The identified triggers/events leading up to this admission include: son recently diagnosed with cancer; increased anxiety    Initial Plans for this admission: psychiatrist will meet with patient to review medications and possible adjustments; CM will meet with patient to determine any additional supports patient may be eligible for; nursing will meet with patient to review and education on medications and coping skills

## 2020-08-25 LAB
GLUCOSE SERPL-MCNC: 117 MG/DL (ref 65–140)
GLUCOSE SERPL-MCNC: 158 MG/DL (ref 65–140)
GLUCOSE SERPL-MCNC: 159 MG/DL (ref 65–140)
GLUCOSE SERPL-MCNC: 171 MG/DL (ref 65–140)

## 2020-08-25 PROCEDURE — 99232 SBSQ HOSP IP/OBS MODERATE 35: CPT | Performed by: PHYSICIAN ASSISTANT

## 2020-08-25 PROCEDURE — 82948 REAGENT STRIP/BLOOD GLUCOSE: CPT

## 2020-08-25 RX ORDER — ESCITALOPRAM OXALATE 5 MG/1
5 TABLET ORAL DAILY
Status: DISCONTINUED | OUTPATIENT
Start: 2020-08-25 | End: 2020-08-28 | Stop reason: HOSPADM

## 2020-08-25 RX ORDER — HYDROXYZINE HYDROCHLORIDE 25 MG/1
25 TABLET, FILM COATED ORAL EVERY 6 HOURS PRN
Status: DISCONTINUED | OUTPATIENT
Start: 2020-08-25 | End: 2020-08-28 | Stop reason: HOSPADM

## 2020-08-25 RX ORDER — HYDROXYZINE 50 MG/1
50 TABLET, FILM COATED ORAL EVERY 6 HOURS PRN
Status: DISCONTINUED | OUTPATIENT
Start: 2020-08-25 | End: 2020-08-28 | Stop reason: HOSPADM

## 2020-08-25 RX ADMIN — BUSPIRONE HYDROCHLORIDE 20 MG: 10 TABLET ORAL at 21:20

## 2020-08-25 RX ADMIN — TRAZODONE HYDROCHLORIDE 50 MG: 50 TABLET ORAL at 21:21

## 2020-08-25 RX ADMIN — HYDROXYZINE HYDROCHLORIDE 75 MG: 50 TABLET, FILM COATED ORAL at 13:44

## 2020-08-25 RX ADMIN — CLONAZEPAM 1 MG: 1 TABLET ORAL at 08:56

## 2020-08-25 RX ADMIN — CLONAZEPAM 1 MG: 1 TABLET ORAL at 16:50

## 2020-08-25 RX ADMIN — GABAPENTIN 800 MG: 400 CAPSULE ORAL at 12:00

## 2020-08-25 RX ADMIN — AMLODIPINE BESYLATE 5 MG: 5 TABLET ORAL at 08:54

## 2020-08-25 RX ADMIN — SUCRALFATE 1 G: 1 TABLET ORAL at 06:40

## 2020-08-25 RX ADMIN — PANTOPRAZOLE SODIUM 40 MG: 40 TABLET, DELAYED RELEASE ORAL at 06:40

## 2020-08-25 RX ADMIN — SUCRALFATE 1 G: 1 TABLET ORAL at 16:25

## 2020-08-25 RX ADMIN — SUCRALFATE 1 G: 1 TABLET ORAL at 12:00

## 2020-08-25 RX ADMIN — GABAPENTIN 800 MG: 400 CAPSULE ORAL at 08:54

## 2020-08-25 RX ADMIN — GABAPENTIN 800 MG: 400 CAPSULE ORAL at 21:20

## 2020-08-25 RX ADMIN — SUCRALFATE 1 G: 1 TABLET ORAL at 21:20

## 2020-08-25 RX ADMIN — GLIPIZIDE 5 MG: 5 TABLET ORAL at 06:40

## 2020-08-25 RX ADMIN — NICOTINE 1 PATCH: 14 PATCH TRANSDERMAL at 08:55

## 2020-08-25 RX ADMIN — LITHIUM CARBONATE 450 MG: 300 CAPSULE, GELATIN COATED ORAL at 21:20

## 2020-08-25 RX ADMIN — CLONAZEPAM 1 MG: 1 TABLET ORAL at 21:20

## 2020-08-25 RX ADMIN — BUSPIRONE HYDROCHLORIDE 20 MG: 10 TABLET ORAL at 16:50

## 2020-08-25 RX ADMIN — METOPROLOL TARTRATE 12.5 MG: 25 TABLET ORAL at 21:20

## 2020-08-25 RX ADMIN — METOPROLOL TARTRATE 12.5 MG: 25 TABLET ORAL at 08:55

## 2020-08-25 RX ADMIN — LITHIUM CARBONATE 300 MG: 300 CAPSULE, GELATIN COATED ORAL at 08:54

## 2020-08-25 RX ADMIN — INSULIN LISPRO 1 UNITS: 100 INJECTION, SOLUTION INTRAVENOUS; SUBCUTANEOUS at 21:19

## 2020-08-25 RX ADMIN — BUSPIRONE HYDROCHLORIDE 20 MG: 10 TABLET ORAL at 08:55

## 2020-08-25 RX ADMIN — ESCITALOPRAM 5 MG: 5 TABLET, FILM COATED ORAL at 09:19

## 2020-08-25 RX ADMIN — GABAPENTIN 800 MG: 400 CAPSULE ORAL at 17:05

## 2020-08-25 NOTE — PROGRESS NOTES
Progress Note - Behavioral Health   Jose Rios 48 y o  female MRN: [id number]  Unit/Bed#: U 260-01 Encounter: [id number]    Assessment/Plan   Principal Problem:    Bipolar disorder ([address] )  Active Problems:    Essential hypertension    Post-traumatic stress disorder, chronic    Generalized anxiety disorder    Alcohol abuse    Suicidal ideation    Medical clearance for psychiatric admission    Type 2 diabetes mellitus without complication, without long-term current use of insulin ([facility])      Subjective:  Patient remains with flat affect  States she is doing well  Then when questioned if she is ready to be discharged she became slightly tearful and fearful to leave  Did agree to start antidepressant at low-dose for depression and anxiety purposes  At this time shows no irritability or agitation  No signs of doc  Denied psychosis  Denied delusional material   Reports depressive symptoms of low energy, low motivation, has flat affect, and diminished passive death wishes  Contracts for safety  Stated anxiety is better but did become increasingly anxious when talking about discharge  Denied PTSD related symptoms  Medication compliant  Appears to be tolerating medications well without serious side effects      Current Medications:  Current Facility-Administered Medications   Medication Dose Route Frequency    acetaminophen (TYLENOL) tablet 325 mg  325 mg Oral Q6H PRN    acetaminophen (TYLENOL) tablet 650 mg  650 mg Oral Q4H PRN    acetaminophen (TYLENOL) tablet 975 mg  975 mg Oral Q6H PRN    aluminum-magnesium hydroxide-simethicone (MYLANTA) 200-200-20 mg/5 mL oral suspension 30 mL  30 mL Oral Q4H PRN    amLODIPine (NORVASC) tablet 5 mg  5 mg Oral Daily    benztropine (COGENTIN) injection 1 mg  1 mg Intramuscular Q6H PRN    benztropine (COGENTIN) tablet 1 mg  1 mg Oral Q6H PRN    busPIRone (BUSPAR) tablet 20 mg  20 mg Oral TID    clonazePAM (KlonoPIN) tablet 1 mg  1 mg Oral TID    escitalopram (LEXAPRO) tablet 5 mg  5 mg Oral Daily    gabapentin (NEURONTIN) capsule 800 mg  800 mg Oral 4x Daily    glipiZIDE (GLUCOTROL) tablet 5 mg  5 mg Oral Daily Before Breakfast    haloperidol (HALDOL) tablet 5 mg  5 mg Oral Q6H PRN    haloperidol lactate (HALDOL) injection 5 mg  5 mg Intramuscular Q6H PRN    hydrOXYzine HCL (ATARAX) tablet 25 mg  25 mg Oral Q6H PRN    hydrOXYzine HCL (ATARAX) tablet 50 mg  50 mg Oral Q6H PRN    hydrOXYzine HCL (ATARAX) tablet 75 mg  75 mg Oral Q6H PRN    insulin lispro (HumaLOG) 100 units/mL subcutaneous injection 1-6 Units  1-6 Units Subcutaneous TID AC    insulin lispro (HumaLOG) 100 units/mL subcutaneous injection 1-6 Units  1-6 Units Subcutaneous HS    lithium carbonate capsule 300 mg  300 mg Oral Daily    lithium carbonate capsule 450 mg  450 mg Oral HS    LORazepam (ATIVAN) injection 2 mg  2 mg Intramuscular Q6H PRN    magnesium hydroxide (MILK OF MAGNESIA) 400 mg/5 mL oral suspension 30 mL  30 mL Oral Daily PRN    metoprolol tartrate (LOPRESSOR) partial tablet 12 5 mg  12 5 mg Oral Q12H NACHO    nicotine (NICODERM CQ) 14 mg/24hr TD 24 hr patch 1 patch  1 patch Transdermal Daily    nicotine polacrilex (NICORETTE) gum 2 mg  2 mg Oral Q2H PRN    OLANZapine (ZyPREXA) IM injection 10 mg  10 mg Intramuscular Q8H PRN    OLANZapine (ZyPREXA) tablet 10 mg  10 mg Oral Q8H PRN    pantoprazole (PROTONIX) EC tablet 40 mg  40 mg Oral Early Morning    risperiDONE (RisperDAL M-TABS) dispersible tablet 1 mg  1 mg Oral Q3H PRN    sucralfate (CARAFATE) tablet 1 g  1 g Oral 4x Daily (AC & HS)    traZODone (DESYREL) tablet 50 mg  50 mg Oral HS PRN       Behavioral Health Medications: all current active meds have been reviewed and continue current psychiatric medications  Vitals:  Vitals:    08/25/20 0718   BP: 143/86   Pulse: 90   Resp: 18   Temp: 98 °F (36 7 °C)   SpO2:        Laboratory results:    I have personally reviewed all pertinent laboratory/tests results    Most Recent Labs:   Lab Results   Component Value Date    WBC 8 71 08/22/2020    RBC 4 23 08/22/2020    HGB 13 6 08/22/2020    HCT 41 5 08/22/2020     08/22/2020    RDW 14 0 08/22/2020    NEUTROABS 5 14 08/22/2020    SODIUM 136 08/22/2020    K 3 8 08/22/2020     08/22/2020    CO2 26 08/22/2020    BUN 10 08/22/2020    CREATININE 0 78 08/22/2020    GLUC 116 08/22/2020    GLUF 119 (H) 12/18/2019    CALCIUM 8 3 08/22/2020    AST 24 08/22/2020    ALT 64 08/22/2020    ALKPHOS 99 08/22/2020    TP 6 8 08/22/2020    ALB 3 4 (L) 08/22/2020    TBILI 0 70 08/22/2020    CHOLESTEROL 237 (H) 07/28/2020    HDL 40 07/28/2020    TRIG 287 (H) 07/28/2020    LDLCALC 140 (H) 07/28/2020    NONHDLC 197 07/28/2020    LITHIUM 0 8 08/21/2020    AMMONIA <9 (L) 02/10/2020    [id number] 6 457 (H) 08/22/2020    FREET4 1 10 08/22/2020    PREGSERUM Negative 08/22/2020    RPR Non-Reactive 07/28/2020    HGBA1C 7 0 (H) 05/14/2020     05/14/2020       Psychiatric Review of Systems:  Behavior over the last 24 hours:  unchanged  Sleep: improved  Appetite: normal  Medication side effects: No  ROS: no complaints, all others negative    Mental Status Evaluation:  Appearance:  casually dressed   Behavior:  guarded   Speech:  soft   Mood:  depressed   Affect:  flat   Language sparse   Thought Process:  goal directed and linear   Thought Content:  normal   Perceptual Disturbances: None   Risk Potential: diminished passive death wishes  Denied HI  Potential for aggression: no   Sensorium:  person, place and time/date   Cognition:  recent and remote memory grossly intact   Consciousness:  alert and awake    Attention: attention span appeared shorter than expected for age   Insight:  limited   Judgment: improving   Gait/Station: normal gait/station and normal balance   Motor Activity: no abnormal movements     Progress Toward Goals: slow    Recommended Treatment: Continue with group therapy, milieu therapy and occupational therapy      1   Add Lexapro 5mg QD for depression/anxiety management  2  Continue other medications  3  Disposition planning     Risks, benefits and possible side effects of Medications:   Risks, benefits, and possible side effects of medications explained to patient and patient verbalizes understanding        Antonio Saldana PA-C

## 2020-08-25 NOTE — PLAN OF CARE
Problem: Depression  Goal: Treatment Goal: Demonstrate behavioral control of depressive symptoms, verbalize feelings of improved mood/affect, and adopt new coping skills prior to discharge  Outcome: Progressing  Goal: Verbalize thoughts and feelings  Description: Interventions:  - Assess and re-assess patient's level of risk   - Engage patient in 1:1 interactions, daily, for a minimum of 15 minutes   - Encourage patient to express feelings, fears, frustrations, hopes   Outcome: Progressing     Problem: Anxiety  Goal: Anxiety is at manageable level  Description: Interventions:  - Assess and monitor patient's anxiety level  - Monitor for signs and symptoms (heart palpitations, chest pain, shortness of breath, headaches, nausea, feeling jumpy, restlessness, irritable, apprehensive)  - Collaborate with interdisciplinary team and initiate plan and interventions as ordered  - Jeffrey patient to unit/surroundings  - Explain treatment plan  - Encourage participation in care  - Encourage verbalization of concerns/fears  - Identify coping mechanisms  - Assist in developing anxiety-reducing skills  - Administer/offer alternative therapies  - Limit or eliminate stimulants  Outcome: Progressing     Problem: SAFETY ADULT  Goal: Patient will remain free of falls  Description: INTERVENTIONS:  - Assess patient frequently for physical needs  -  Identify cognitive and physical deficits and behaviors that affect risk of falls    -  [place] fall precautions as indicated by assessment   - Educate patient/family on patient safety including physical limitations  - Instruct patient to call for assistance with activity based on assessment  - Modify environment to reduce risk of injury  - Consider OT/PT consult to assist with strengthening/mobility  Outcome: Progressing

## 2020-08-25 NOTE — PROGRESS NOTES
08/25/20 0820   Team Meeting   Meeting Type Daily Rounds   Team Members Present   Team Members Present Physician;Nurse;;; Occupational Therapist   Physician Team Member Dr Farrukh Villanueva MD; Dr Ailyn Avalos MD; Rome Cope PA-C   Nursing Team Member Steven Coles RN   Care Management Team Member Geri Tenorio   Social Work Team Member Rosio Naik [place]   Patient/Family Present   Patient Present No   Patient's Family Present No     Flat, dep 2/10, anx 3/10; denies SI/HI; visible on unit; no DC date
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
## 2020-08-25 NOTE — PROGRESS NOTES
Pt had difficulty falling asleep however once given PRN for pain and Trazodone for sleep, pt was observed snoring, appears to have slept through the night and remains asleep currently

## 2020-08-25 NOTE — PROGRESS NOTES
Patient walking the halls, drinking water and seclusive to self  Attire is disheveled choosing to wear hospital attire  Denies SI/HI  Improved mood including anxiety  States the plan for discharge is the end of the week  Provided information on lexapro and metabolic syndrome  Attends groups
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
## 2020-08-26 LAB
GLUCOSE SERPL-MCNC: 128 MG/DL (ref 65–140)
GLUCOSE SERPL-MCNC: 132 MG/DL (ref 65–140)
GLUCOSE SERPL-MCNC: 158 MG/DL (ref 65–140)
GLUCOSE SERPL-MCNC: 90 MG/DL (ref 65–140)

## 2020-08-26 PROCEDURE — 99232 SBSQ HOSP IP/OBS MODERATE 35: CPT | Performed by: PHYSICIAN ASSISTANT

## 2020-08-26 PROCEDURE — 82948 REAGENT STRIP/BLOOD GLUCOSE: CPT

## 2020-08-26 RX ORDER — TRAZODONE HYDROCHLORIDE 100 MG/1
100 TABLET ORAL
Status: DISCONTINUED | OUTPATIENT
Start: 2020-08-26 | End: 2020-08-28 | Stop reason: HOSPADM

## 2020-08-26 RX ADMIN — PANTOPRAZOLE SODIUM 40 MG: 40 TABLET, DELAYED RELEASE ORAL at 06:37

## 2020-08-26 RX ADMIN — METOPROLOL TARTRATE 12.5 MG: 25 TABLET ORAL at 21:29

## 2020-08-26 RX ADMIN — CLONAZEPAM 1 MG: 1 TABLET ORAL at 17:10

## 2020-08-26 RX ADMIN — NICOTINE 1 PATCH: 14 PATCH TRANSDERMAL at 09:02

## 2020-08-26 RX ADMIN — ESCITALOPRAM 5 MG: 5 TABLET, FILM COATED ORAL at 08:57

## 2020-08-26 RX ADMIN — SUCRALFATE 1 G: 1 TABLET ORAL at 21:29

## 2020-08-26 RX ADMIN — AMLODIPINE BESYLATE 5 MG: 5 TABLET ORAL at 08:57

## 2020-08-26 RX ADMIN — GABAPENTIN 800 MG: 400 CAPSULE ORAL at 12:33

## 2020-08-26 RX ADMIN — BUSPIRONE HYDROCHLORIDE 20 MG: 10 TABLET ORAL at 17:10

## 2020-08-26 RX ADMIN — BUSPIRONE HYDROCHLORIDE 20 MG: 10 TABLET ORAL at 08:57

## 2020-08-26 RX ADMIN — GABAPENTIN 800 MG: 400 CAPSULE ORAL at 17:10

## 2020-08-26 RX ADMIN — GABAPENTIN 800 MG: 400 CAPSULE ORAL at 08:57

## 2020-08-26 RX ADMIN — SUCRALFATE 1 G: 1 TABLET ORAL at 06:37

## 2020-08-26 RX ADMIN — SUCRALFATE 1 G: 1 TABLET ORAL at 17:11

## 2020-08-26 RX ADMIN — LITHIUM CARBONATE 450 MG: 300 CAPSULE, GELATIN COATED ORAL at 21:29

## 2020-08-26 RX ADMIN — LITHIUM CARBONATE 300 MG: 300 CAPSULE, GELATIN COATED ORAL at 08:57

## 2020-08-26 RX ADMIN — SUCRALFATE 1 G: 1 TABLET ORAL at 12:33

## 2020-08-26 RX ADMIN — METOPROLOL TARTRATE 12.5 MG: 25 TABLET ORAL at 08:57

## 2020-08-26 RX ADMIN — GLIPIZIDE 5 MG: 5 TABLET ORAL at 08:57

## 2020-08-26 RX ADMIN — BUSPIRONE HYDROCHLORIDE 20 MG: 10 TABLET ORAL at 21:29

## 2020-08-26 RX ADMIN — CLONAZEPAM 1 MG: 1 TABLET ORAL at 21:28

## 2020-08-26 RX ADMIN — GABAPENTIN 800 MG: 400 CAPSULE ORAL at 21:30

## 2020-08-26 RX ADMIN — INSULIN LISPRO 1 UNITS: 100 INJECTION, SOLUTION INTRAVENOUS; SUBCUTANEOUS at 09:02

## 2020-08-26 RX ADMIN — TRAZODONE HYDROCHLORIDE 100 MG: 100 TABLET ORAL at 21:32

## 2020-08-26 RX ADMIN — CLONAZEPAM 1 MG: 1 TABLET ORAL at 08:57

## 2020-08-26 NOTE — PROGRESS NOTES
Appreciative for care  Received additional education on diabetes  Discussed anxiety and healthy coping skills

## 2020-08-26 NOTE — PROGRESS NOTES
Patient received Trazodone 50mg PRN in addition to her scheduled HS meds, and upon follow up pt appeared to sleep through the night, meds effective

## 2020-08-26 NOTE — SOCIAL WORK
SW met with patient; Pt less anxious today; pt states that she is feeling better and believes the medications are working;   Pt continues to hope for discharge on Friday; no questions or concerns for SW

## 2020-08-26 NOTE — PROGRESS NOTES
08/26/20 0757   Team Meeting   Meeting Type Tx Team Meeting   Team Members Present   Team Members Present Physician;Nurse;;; Occupational Therapist   Physician Team Member Dr Adrian Toscano MD; Dr Denise Foster MD; Jose Cervantes PA-C   Nursing Team Member Vishal Hutson RN   Care Management Team Member Graeme Jurado   Social Work Team [facility]   OT Team Member Vivien Landrum   Patient/Family Present   Patient Present No   Patient's Family Present No     Denies SI/HI/AH/VH, endorsed increased anxiety after telephone call from son; pt seeking discharge this week; declined med adjustments; prn utilized; discharge Friday or Monday
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
## 2020-08-26 NOTE — PROGRESS NOTES
Awake and alert  Walking the halls throughout the day, seclusive to self for majority of the day  States she spoke with her family and all are safe  Improved anxiety and depression  Compliant with medications, denies side effects  States she has never been followed by an endocrinologist and never checks blood sugar at home and does not have a glucometer  Provided additional information on diabetes management

## 2020-08-27 LAB
GLUCOSE SERPL-MCNC: 131 MG/DL (ref 65–140)
GLUCOSE SERPL-MCNC: 154 MG/DL (ref 65–140)
GLUCOSE SERPL-MCNC: 171 MG/DL (ref 65–140)
GLUCOSE SERPL-MCNC: 176 MG/DL (ref 65–140)

## 2020-08-27 PROCEDURE — 82948 REAGENT STRIP/BLOOD GLUCOSE: CPT

## 2020-08-27 PROCEDURE — 99232 SBSQ HOSP IP/OBS MODERATE 35: CPT | Performed by: PHYSICIAN ASSISTANT

## 2020-08-27 RX ADMIN — GABAPENTIN 800 MG: 400 CAPSULE ORAL at 08:52

## 2020-08-27 RX ADMIN — BUSPIRONE HYDROCHLORIDE 20 MG: 10 TABLET ORAL at 20:39

## 2020-08-27 RX ADMIN — AMLODIPINE BESYLATE 5 MG: 5 TABLET ORAL at 08:52

## 2020-08-27 RX ADMIN — INSULIN LISPRO 1 UNITS: 100 INJECTION, SOLUTION INTRAVENOUS; SUBCUTANEOUS at 08:59

## 2020-08-27 RX ADMIN — SUCRALFATE 1 G: 1 TABLET ORAL at 16:19

## 2020-08-27 RX ADMIN — INSULIN LISPRO 1 UNITS: 100 INJECTION, SOLUTION INTRAVENOUS; SUBCUTANEOUS at 16:22

## 2020-08-27 RX ADMIN — BUSPIRONE HYDROCHLORIDE 20 MG: 10 TABLET ORAL at 08:52

## 2020-08-27 RX ADMIN — INSULIN LISPRO 1 UNITS: 100 INJECTION, SOLUTION INTRAVENOUS; SUBCUTANEOUS at 12:07

## 2020-08-27 RX ADMIN — METOPROLOL TARTRATE 12.5 MG: 25 TABLET ORAL at 21:06

## 2020-08-27 RX ADMIN — METOPROLOL TARTRATE 12.5 MG: 25 TABLET ORAL at 08:53

## 2020-08-27 RX ADMIN — ESCITALOPRAM 5 MG: 5 TABLET, FILM COATED ORAL at 08:52

## 2020-08-27 RX ADMIN — PANTOPRAZOLE SODIUM 40 MG: 40 TABLET, DELAYED RELEASE ORAL at 06:17

## 2020-08-27 RX ADMIN — SUCRALFATE 1 G: 1 TABLET ORAL at 11:26

## 2020-08-27 RX ADMIN — LITHIUM CARBONATE 450 MG: 300 CAPSULE, GELATIN COATED ORAL at 21:16

## 2020-08-27 RX ADMIN — CLONAZEPAM 1 MG: 1 TABLET ORAL at 20:39

## 2020-08-27 RX ADMIN — GABAPENTIN 800 MG: 400 CAPSULE ORAL at 17:03

## 2020-08-27 RX ADMIN — NICOTINE 1 PATCH: 14 PATCH TRANSDERMAL at 08:54

## 2020-08-27 RX ADMIN — GABAPENTIN 800 MG: 400 CAPSULE ORAL at 12:07

## 2020-08-27 RX ADMIN — LITHIUM CARBONATE 300 MG: 300 CAPSULE, GELATIN COATED ORAL at 08:52

## 2020-08-27 RX ADMIN — CLONAZEPAM 1 MG: 1 TABLET ORAL at 16:19

## 2020-08-27 RX ADMIN — GLIPIZIDE 5 MG: 5 TABLET ORAL at 08:53

## 2020-08-27 RX ADMIN — CLONAZEPAM 1 MG: 1 TABLET ORAL at 08:51

## 2020-08-27 RX ADMIN — SUCRALFATE 1 G: 1 TABLET ORAL at 21:17

## 2020-08-27 RX ADMIN — SUCRALFATE 1 G: 1 TABLET ORAL at 06:17

## 2020-08-27 RX ADMIN — GABAPENTIN 800 MG: 400 CAPSULE ORAL at 21:05

## 2020-08-27 RX ADMIN — BUSPIRONE HYDROCHLORIDE 20 MG: 10 TABLET ORAL at 16:19

## 2020-08-27 RX ADMIN — TRAZODONE HYDROCHLORIDE 100 MG: 100 TABLET ORAL at 21:19
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
## 2020-08-27 NOTE — PROGRESS NOTES
Progress Note - Behavioral Health   Hal Leger 48 y o  female MRN: [id number]  Unit/Bed#: [facility] 260-01 Encounter: [id number]    Assessment/Plan   Principal Problem:    Bipolar disorder ([address] )  Active Problems:    Essential hypertension    Post-traumatic stress disorder, chronic    Generalized anxiety disorder    Alcohol abuse    Suicidal ideation    Medical clearance for psychiatric admission    Type 2 diabetes mellitus without complication, without long-term current use of insulin ([facility])      Subjective:  Patient today cooperative and less guarded  Does appear to be flat but does have brighter affect  States her depression is more under control and is denying suicidal thoughts at this time  Is seen out attending groups and appears more future oriented  States she is sleeping well and not waking up with panic attacks anymore  States she has mild anxiety in regards to discharging but feels she is more ready at this time  Denied panic attacks throughout the day  Has not required additional medications  Not interested in medications to help curb drinking  No signs of doc or agitation  Denied psychosis and delusional material   Medication compliant  Appears to be tolerating medications well without serious side effects      Current Medications:  Current Facility-Administered Medications   Medication Dose Route Frequency    acetaminophen (TYLENOL) tablet 325 mg  325 mg Oral Q6H PRN    acetaminophen (TYLENOL) tablet 650 mg  650 mg Oral Q4H PRN    acetaminophen (TYLENOL) tablet 975 mg  975 mg Oral Q6H PRN    aluminum-magnesium hydroxide-simethicone (MYLANTA) 200-200-20 mg/5 mL oral suspension 30 mL  30 mL Oral Q4H PRN    amLODIPine (NORVASC) tablet 5 mg  5 mg Oral Daily    benztropine (COGENTIN) injection 1 mg  1 mg Intramuscular Q6H PRN    benztropine (COGENTIN) tablet 1 mg  1 mg Oral Q6H PRN    busPIRone (BUSPAR) tablet 20 mg  20 mg Oral TID    clonazePAM (KlonoPIN) tablet 1 mg  1 mg Oral TID    escitalopram (LEXAPRO) tablet 5 mg  5 mg Oral Daily    gabapentin (NEURONTIN) capsule 800 mg  800 mg Oral 4x Daily    glipiZIDE (GLUCOTROL) tablet 5 mg  5 mg Oral Daily Before Breakfast    haloperidol (HALDOL) tablet 5 mg  5 mg Oral Q6H PRN    haloperidol lactate (HALDOL) injection 5 mg  5 mg Intramuscular Q6H PRN    hydrOXYzine HCL (ATARAX) tablet 25 mg  25 mg Oral Q6H PRN    hydrOXYzine HCL (ATARAX) tablet 50 mg  50 mg Oral Q6H PRN    hydrOXYzine HCL (ATARAX) tablet 75 mg  75 mg Oral Q6H PRN    insulin lispro (HumaLOG) 100 units/mL subcutaneous injection 1-6 Units  1-6 Units Subcutaneous TID AC    insulin lispro (HumaLOG) 100 units/mL subcutaneous injection 1-6 Units  1-6 Units Subcutaneous HS    lithium carbonate capsule 300 mg  300 mg Oral Daily    lithium carbonate capsule 450 mg  450 mg Oral HS    LORazepam (ATIVAN) injection 2 mg  2 mg Intramuscular Q6H PRN    magnesium hydroxide (MILK OF MAGNESIA) 400 mg/5 mL oral suspension 30 mL  30 mL Oral Daily PRN    metoprolol tartrate (LOPRESSOR) partial tablet 12 5 mg  12 5 mg Oral Q12H NACHO    nicotine (NICODERM CQ) 14 mg/24hr TD 24 hr patch 1 patch  1 patch Transdermal Daily    nicotine polacrilex (NICORETTE) gum 2 mg  2 mg Oral Q2H PRN    OLANZapine (ZyPREXA) IM injection 10 mg  10 mg Intramuscular Q8H PRN    OLANZapine (ZyPREXA) tablet 10 mg  10 mg Oral Q8H PRN    pantoprazole (PROTONIX) EC tablet 40 mg  40 mg Oral Early Morning    risperiDONE (RisperDAL M-TABS) dispersible tablet 1 mg  1 mg Oral Q3H PRN    sucralfate (CARAFATE) tablet 1 g  1 g Oral 4x Daily (AC & HS)    traZODone (DESYREL) tablet 100 mg  100 mg Oral HS PRN       Behavioral Health Medications: all current active meds have been reviewed and continue current psychiatric medications      Vitals:  Vitals:    08/27/20 0800   BP: 140/69   Pulse: 85   Resp: 18   Temp: 98 °F (36 7 °C)   SpO2: 96%       Laboratory results:    I have personally reviewed all pertinent laboratory/tests results  Most Recent Labs:   Lab Results   Component Value Date    WBC 8 71 08/22/2020    RBC 4 23 08/22/2020    HGB 13 6 08/22/2020    HCT 41 5 08/22/2020     08/22/2020    RDW 14 0 08/22/2020    NEUTROABS 5 14 08/22/2020    SODIUM 136 08/22/2020    K 3 8 08/22/2020     08/22/2020    CO2 26 08/22/2020    BUN 10 08/22/2020    CREATININE 0 78 08/22/2020    GLUC 116 08/22/2020    GLUF 119 (H) 12/18/2019    CALCIUM 8 3 08/22/2020    AST 24 08/22/2020    ALT 64 08/22/2020    ALKPHOS 99 08/22/2020    TP 6 8 08/22/2020    ALB 3 4 (L) 08/22/2020    TBILI 0 70 08/22/2020    CHOLESTEROL 237 (H) 07/28/2020    HDL 40 07/28/2020    TRIG 287 (H) 07/28/2020    LDLCALC 140 (H) 07/28/2020    NONHDLC 197 07/28/2020    LITHIUM 0 8 08/21/2020    AMMONIA <9 (L) 02/10/2020    HOI4ROPLIZBV 6 457 (H) 08/22/2020    FREET4 1 10 08/22/2020    PREGSERUM Negative 08/22/2020    RPR Non-Reactive 07/28/2020    HGBA1C 7 0 (H) 05/14/2020     05/14/2020       Psychiatric Review of Systems:  Behavior over the last 24 hours:  improved  Sleep: normal  Appetite: normal  Medication side effects: No  ROS: no complaints, all others negative    Mental Status Evaluation:  Appearance:  casually dressed   Behavior:  cooperative, less guarded   Speech:  soft   Mood:  less anxious   Affect:  flat   Language appropriate   Thought Process:  logical   Thought Content:  normal   Perceptual Disturbances: None   Risk Potential: Denied SI/HI  Potential for aggression: No   Sensorium:  person, place and time/date   Cognition:  recent and remote memory grossly intact   Consciousness:  alert and awake    Attention: attention span appeared shorter than expected for age   Insight:  partial   Judgment: improving   Gait/Station: normal gait/station and normal balance   Motor Activity: no abnormal movements     Progress Toward Goals: progressing    Recommended Treatment: Continue with group therapy, milieu therapy and occupational therapy     1   Continue current medications  2  Disposition planning with tentative discharge tomorrow     Risks, benefits and possible side effects of Medications:   Risks, benefits, and possible side effects of medications explained to patient and patient verbalizes understanding        Jefe Mccartney PA-C

## 2020-08-27 NOTE — PROGRESS NOTES
08/27/20 0732   Team Meeting   Meeting Type Daily Rounds   Team Members Present   Team Members Present Physician;Nurse;;; Occupational Therapist   Physician Team Member Dr Alfredito Templeton MD; Dr Dav Iraheta MD; Camryn Zuluaga PA-C   Nursing Team Member Tiffanie Umaña RN   Care Management Team Member Katy López   Social Work Team Member Omayra Ventura, [place]   OT Team Member Deborah Bob   Patient/Family Present   Patient Present No   Patient's Family Present No     Anxiety improved; denies SI/HI; dep improved; blood sugars improving; pt requires script for glucometer for discharge; discharge Fri 8/28

## 2020-08-27 NOTE — PLAN OF CARE
Problem: Depression  Goal: Treatment Goal: Demonstrate behavioral control of depressive symptoms, verbalize feelings of improved mood/affect, and adopt new coping skills prior to discharge  Outcome: Progressing  Goal: Verbalize thoughts and feelings  Description: Interventions:  - Assess and re-assess patient's level of risk   - Engage patient in 1:1 interactions, daily, for a minimum of 15 minutes   - Encourage patient to express feelings, fears, frustrations, hopes   Outcome: Progressing     Problem: Anxiety  Goal: Anxiety is at manageable level  Description: Interventions:  - Assess and monitor patient's anxiety level  - Monitor for signs and symptoms (heart palpitations, chest pain, shortness of breath, headaches, nausea, feeling jumpy, restlessness, irritable, apprehensive)  - Collaborate with interdisciplinary team and initiate plan and interventions as ordered  - Powersite patient to unit/surroundings  - Explain treatment plan  - Encourage participation in care  - Encourage verbalization of concerns/fears  - Identify coping mechanisms  - Assist in developing anxiety-reducing skills  - Administer/offer alternative therapies  - Limit or eliminate stimulants  Outcome: Progressing     Problem: SAFETY ADULT  Goal: Patient will remain free of falls  Description: INTERVENTIONS:  - Assess patient frequently for physical needs  -  Identify cognitive and physical deficits and behaviors that affect risk of falls    -  [place] fall precautions as indicated by assessment   - Educate patient/family on patient safety including physical limitations  - Instruct patient to call for assistance with activity based on assessment  - Modify environment to reduce risk of injury  - Consider OT/PT consult to assist with strengthening/mobility  Outcome: Progressing

## 2020-08-27 NOTE — PROGRESS NOTES
Pt in her room and reports to this writer that she is nervous towards discharge since "I'm afraid that I'll get anxiety again"  At the present, pt denies anxiety, depression and SI/HI/AVH  Pt continues with a flat affect and denies any questions or having any further concerns

## 2020-08-27 NOTE — PROGRESS NOTES
Requested & received Trazodone 100 mg  Po prn/sleep at 2132  Good effect obtained, as observed asleep in bed within the hr

## 2020-08-28 VITALS
RESPIRATION RATE: 16 BRPM | WEIGHT: 203.71 LBS | TEMPERATURE: 97.5 F | OXYGEN SATURATION: 96 % | DIASTOLIC BLOOD PRESSURE: 90 MMHG | SYSTOLIC BLOOD PRESSURE: 177 MMHG | HEIGHT: 63 IN | BODY MASS INDEX: 36.09 KG/M2 | HEART RATE: 100 BPM

## 2020-08-28 LAB — GLUCOSE SERPL-MCNC: 178 MG/DL (ref 65–140)

## 2020-08-28 PROCEDURE — 99239 HOSP IP/OBS DSCHRG MGMT >30: CPT | Performed by: PHYSICIAN ASSISTANT

## 2020-08-28 PROCEDURE — 82948 REAGENT STRIP/BLOOD GLUCOSE: CPT

## 2020-08-28 RX ORDER — GABAPENTIN 400 MG/1
800 CAPSULE ORAL 4 TIMES DAILY
Qty: 240 CAPSULE | Refills: 0 | Status: SHIPPED | OUTPATIENT
Start: 2020-08-28 | End: 2020-09-07 | Stop reason: HOSPADM

## 2020-08-28 RX ORDER — BUSPIRONE HYDROCHLORIDE 10 MG/1
20 TABLET ORAL 3 TIMES DAILY
Qty: 180 TABLET | Refills: 0 | Status: SHIPPED | OUTPATIENT
Start: 2020-08-28 | End: 2020-09-07 | Stop reason: HOSPADM

## 2020-08-28 RX ORDER — ESCITALOPRAM OXALATE 5 MG/1
5 TABLET ORAL DAILY
Qty: 30 TABLET | Refills: 0 | Status: SHIPPED | OUTPATIENT
Start: 2020-08-29 | End: 2020-09-07 | Stop reason: HOSPADM

## 2020-08-28 RX ORDER — TRAZODONE HYDROCHLORIDE 100 MG/1
100 TABLET ORAL
Qty: 30 TABLET | Refills: 0 | Status: ON HOLD | OUTPATIENT
Start: 2020-08-28 | End: 2020-09-07 | Stop reason: SDUPTHER

## 2020-08-28 RX ORDER — GLIPIZIDE 5 MG/1
5 TABLET ORAL
Qty: 30 TABLET | Refills: 0 | Status: ON HOLD | OUTPATIENT
Start: 2020-08-29 | End: 2020-09-07 | Stop reason: SDUPTHER

## 2020-08-28 RX ORDER — LITHIUM CARBONATE 150 MG/1
450 CAPSULE ORAL
Qty: 30 CAPSULE | Refills: 0 | Status: ON HOLD | OUTPATIENT
Start: 2020-08-28 | End: 2020-09-07 | Stop reason: SDUPTHER

## 2020-08-28 RX ORDER — CLONAZEPAM 1 MG/1
1 TABLET ORAL 3 TIMES DAILY
Qty: 90 TABLET | Refills: 0 | Status: SHIPPED | OUTPATIENT
Start: 2020-08-28 | End: 2020-09-07 | Stop reason: HOSPADM

## 2020-08-28 RX ORDER — LITHIUM CARBONATE 300 MG/1
300 CAPSULE ORAL EVERY MORNING
Qty: 30 CAPSULE | Refills: 0 | Status: ON HOLD | OUTPATIENT
Start: 2020-08-28 | End: 2020-09-07 | Stop reason: SDUPTHER

## 2020-08-28 RX ADMIN — NICOTINE 1 PATCH: 14 PATCH TRANSDERMAL at 08:19

## 2020-08-28 RX ADMIN — SUCRALFATE 1 G: 1 TABLET ORAL at 08:20

## 2020-08-28 RX ADMIN — AMLODIPINE BESYLATE 5 MG: 5 TABLET ORAL at 08:21

## 2020-08-28 RX ADMIN — GABAPENTIN 800 MG: 400 CAPSULE ORAL at 08:20

## 2020-08-28 RX ADMIN — BUSPIRONE HYDROCHLORIDE 20 MG: 10 TABLET ORAL at 08:21

## 2020-08-28 RX ADMIN — SUCRALFATE 1 G: 1 TABLET ORAL at 11:07

## 2020-08-28 RX ADMIN — CLONAZEPAM 1 MG: 1 TABLET ORAL at 08:21

## 2020-08-28 RX ADMIN — GLIPIZIDE 5 MG: 5 TABLET ORAL at 08:21

## 2020-08-28 RX ADMIN — PANTOPRAZOLE SODIUM 40 MG: 40 TABLET, DELAYED RELEASE ORAL at 06:32

## 2020-08-28 RX ADMIN — LITHIUM CARBONATE 300 MG: 300 CAPSULE, GELATIN COATED ORAL at 08:21

## 2020-08-28 RX ADMIN — GABAPENTIN 800 MG: 400 CAPSULE ORAL at 11:07

## 2020-08-28 RX ADMIN — INSULIN LISPRO 1 UNITS: 100 INJECTION, SOLUTION INTRAVENOUS; SUBCUTANEOUS at 08:21

## 2020-08-28 RX ADMIN — METOPROLOL TARTRATE 12.5 MG: 25 TABLET ORAL at 08:20

## 2020-08-28 RX ADMIN — ESCITALOPRAM 5 MG: 5 TABLET, FILM COATED ORAL at 08:21

## 2020-08-28 NOTE — PROGRESS NOTES
08/28/20 0740   Team Meeting   Meeting Type Daily Rounds   Team Members Present   Team Members Present Physician;Nurse;;; Occupational Therapist   Physician Team Member Dr Terrence Orellana MD; Dr Aileen Taylor MD; Sana Clemons PA-C   Nursing Team Member Flor Velasquez RN   Care Management Team Member Martha Echavarria   Social Work Team Member [facility]   OT Team Member RODERICK Sales   Patient/Family Present   Patient Present No   Patient's Family Present No     Visible, denies SI/HI; anxious; fears being alone; prns utilized overnight; dc today to home with outpt services

## 2020-08-28 NOTE — PROGRESS NOTES
Pt is OOB and visible on unit this morning  Appears with depressed affect however continues to improve overall  Pt's main concern is anxiety and coping skills  Pt feels she will be ok and states her family needs her at home  Pt states she will be assisting with the care of her 7 month old grandson  RN encouraged pt to practice self care after discharge to help with anxiety  Pt receptive

## 2020-08-28 NOTE — NURSING NOTE
Reviewed AVS discharge instructions with patient  Pt denies any concerns or questions  Expresses readiness for discharge  Patient discharged from the unit pleasant and calm

## 2020-08-28 NOTE — SOCIAL WORK
YAMILETH placed phone call to [phone] [phone] to obtain information re: CM referral sent 7/31/2020 for [facility] services; spoke to Odanah /[facility] - will have a CM contact YAMILETH re: referral - awaiting response
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
## 2020-08-28 NOTE — SOCIAL WORK
YAMILETH received phone call from [employer] [facility] [address] advised YAMILETH that assigned CM is Negin Powell - [phone]    YAMILETH placed phone call to Negin Powell - antoinette message on voicemail requesting information re: referral for [facility] services sent 7/31 - awaiting response

## 2020-08-28 NOTE — SOCIAL WORK
YAMILETH placed phone call to transportation to schedule LYFT on patient behalf; spoke to Ed; scheduled for immediate pickup -  will call RN station upon arrival

## 2020-08-28 NOTE — BH TRANSITION RECORD
Contact Information: If you have any questions, concerns, pended studies, tests and/or procedures, or emergencies regarding your inpatient behavioral health visit  Please contact [place] behavioral [facility] [phone] and ask to speak to a , nurse or physician  A contact is available 24 hours/ 7 days a week at this number  Summary of Procedures Performed During your Stay:  Below is a list of major procedures performed during your hospital stay and a summary of results:  - No major procedures performed  Pending Studies (From admission, onward)    None        If studies are pending at discharge, follow up with your PCP and/or referring provider

## 2020-08-28 NOTE — DISCHARGE INSTRUCTIONS
Anxiety   WHAT YOU SHOULD KNOW:   Anxiety is a condition that causes you to feel excessive worry, uneasiness, or fear  Family or work stress, smoking, caffeine, and alcohol can increase your risk for anxiety  Certain medicines or health conditions can also increase your risk  Anxiety may begin gradually, and can become a long-term condition if it is not managed or treated  AFTER YOU LEAVE:   Medicines:   · Medicines  can help you feel more calm and relaxed, and decrease your symptoms  · Take your medicine as directed  Contact your healthcare provider if you think your medicine is not helping or if you have side effects  Tell him if you are allergic to any medicine  Keep a list of the medicines, vitamins, and herbs you take  Include the amounts, and when and why you take them  Bring the list or the pill bottles to follow-up visits  Carry your medicine list with you in case of an emergency  Follow up with your healthcare provider within 2 weeks or as directed:  Write down your questions so you remember to ask them during your visits  Manage anxiety:   · Go to counseling as directed  Cognitive behavioral therapy can help you understand and change how you react to events that trigger your symptoms  · Find ways to manage your symptoms  Activities such as exercise, meditation, or listening to music can help you relax  · Practice deep breathing  Breathing can change how your body reacts to stress  Focus on taking slow, deep breaths several times a day, or during an anxiety attack  Breathe in through your nose, and out through your mouth  · Avoid caffeine  Caffeine can make your symptoms worse  Avoid foods or drinks that are meant to increase your energy level  · Limit or avoid alcohol  Ask your healthcare provider if alcohol is safe for you  You may not be able to drink alcohol if you take certain anxiety or depression medicines  Limit alcohol to 1 drink per day if you are a woman   Limit alcohol to 2 drinks per day if you are a man  A drink of alcohol is 12 ounces of beer, 5 ounces of wine, or 1½ ounces of liquor  Contact your healthcare provider if:   · Your symptoms get worse or do not get better with treatment  · You think your medicine may be causing side effects  · Your anxiety keeps you from doing your regular daily activities  · You have new symptoms since your last visit  · You have questions or concerns about your condition or care  Seek care immediately or call 911 if:   · You have chest pain, tightness, or heaviness that may spread to your shoulders, arms, jaw, neck, or back  · You feel like hurting yourself or someone else  · You feel dizzy, lightheaded, or faint  © 2014 [address] is for End User's use only and may not be sold, redistributed or otherwise used for commercial purposes  All illustrations and images included in CareNotes® are the copyrighted property of A D A M , Inc  or Dennis Jazlyn  The above information is an  only  It is not intended as medical advice for individual conditions or treatments  Talk to your doctor, nurse or pharmacist before following any medical regimen to see if it is safe and effective for you  Alcohol Use Disorder   WHAT YOU NEED TO KNOW:   Alcohol use disorder (AUD) is problem drinking  AUD includes alcohol abuse and alcohol dependency  DISCHARGE INSTRUCTIONS:   Seek care immediately if:   · Your heart is beating faster than usual     · You have hallucinations  · You cannot remember what happens while you are drinking  · You have seizures  Contact your healthcare provider if:   · You are anxious and have nausea  · Your hands are shaky and you are sweating heavily  · You have questions or concerns about your condition or care  Follow up with your healthcare provider as directed:  Do not try to stop drinking on your own   Your healthcare provider may need to help you withdraw from alcohol safely  He may need to admit you to the hospital  You may also need any of the following treatments:  · Medicines to decrease your craving for alcohol    · Support groups such as Alcoholics Anonymous     · Therapy from a psychiatrist or psychologist    · Admission to an inpatient facility for treatment for severe AUD  For support and more information:   · Substance Abuse and [address] , [address] [place]  Web Address: https://[employer]  · Alcoholics Anonymous  Web Address: http://[employer]/  © 2017 [address] Information is for End User's use only and may not be sold, redistributed or otherwise used for commercial purposes  All illustrations and images included in CareNotes® are the copyrighted property of A D A M , Inc  or Dennis Hobson  The above information is an  only  It is not intended as medical advice for individual conditions or treatments  Talk to your doctor, nurse or pharmacist before following any medical regimen to see if it is safe and effective for you  Bipolar Disorder   WHAT YOU NEED TO KNOW:   Bipolar disorder is a long-term chemical imbalance that causes rapid changes in mood and behavior  High moods are called doc  Low moods are called depression  Sometimes you will feel manic and sometimes you will feel depressed  You can have alternating episodes of doc and depression  This is called a mixed bipolar state  DISCHARGE INSTRUCTIONS:   Call 911 if:   · You think about hurting yourself or someone else  Contact your healthcare provider or psychiatrist if:   · You are having trouble managing your bipolar disorder  · You cannot sleep, or are sleeping all the time  · You cannot eat, or are eating more than usual     · You feel dizzy or your stomach is upset  · You cannot make it to your next meeting      · You have questions or concerns about your condition or care   Medicines:   · Medicines  may be given to help keep your mood stable, or to help you sleep  Changes in medicine are often needed as your bipolar disorder changes  · Take your medicine as directed  Contact your healthcare provider if you think your medicine is not helping or if you have side effects  Tell him or her if you are allergic to any medicine  Keep a list of the medicines, vitamins, and herbs you take  Include the amounts, and when and why you take them  Bring the list or the pill bottles to follow-up visits  Carry your medicine list with you in case of an emergency  Follow up with your healthcare provider or psychiatrist as directed:  Write down your questions so you remember to ask them during your visits  Manage bipolar disorder:  Watch for triggers of bipolar disorder symptoms, such as stress  Learn new ways to relax, such as deep breathing, to manage your stress  Tell someone if you feel a manic or depressive period might be coming on  Ask a friend or family member to help watch you for bipolar symptoms  Work to develop skills that will help you manage bipolar disorder  You may need to make lifestyle changes  Ask your healthcare provider or psychiatrist for resources  For support and more information:   · [address] [facility], [address] [facility], [address] [facility], [address]  [place] [address]   Phone: [phone]  Phone: [phone]  Web Address: [facility]  · Depression and [address] ([facility])  [address] [place] [address] , [address] [place] [address]  Phone: [phone]  Web Address: [place]  [facility]   © 2017 [address] [place] [address] Information is for End User's use only and may not be sold, redistributed or otherwise used for commercial purposes   All illustrations and images included in CareNotes® are the copyrighted property of A D A M , Inc  or [employer]  The above information is an  only  It is not intended as medical advice for individual conditions or treatments  Talk to your doctor, nurse or pharmacist before following any medical regimen to see if it is safe and effective for you

## 2020-09-02 ENCOUNTER — HOSPITAL ENCOUNTER (INPATIENT)
Facility: HOSPITAL | Age: 55
LOS: 5 days | Discharge: HOME | DRG: 885 | End: 2020-09-07
Attending: EMERGENCY MEDICINE | Admitting: PSYCHIATRY & NEUROLOGY
Payer: MEDICARE

## 2020-09-02 ENCOUNTER — APPOINTMENT (EMERGENCY)
Dept: RADIOLOGY | Facility: HOSPITAL | Age: 55
DRG: 885 | End: 2020-09-02
Payer: MEDICARE

## 2020-09-02 ENCOUNTER — APPOINTMENT (EMERGENCY)
Dept: CT IMAGING | Facility: HOSPITAL | Age: 55
DRG: 885 | End: 2020-09-02
Payer: MEDICARE

## 2020-09-02 DIAGNOSIS — R52 PAIN: ICD-10-CM

## 2020-09-02 DIAGNOSIS — F41.9 ANXIETY: ICD-10-CM

## 2020-09-02 DIAGNOSIS — F43.12 POST-TRAUMATIC STRESS DISORDER, CHRONIC: Chronic | ICD-10-CM

## 2020-09-02 DIAGNOSIS — G47.00 INSOMNIA: ICD-10-CM

## 2020-09-02 DIAGNOSIS — I10 ESSENTIAL HYPERTENSION: ICD-10-CM

## 2020-09-02 DIAGNOSIS — E11.9 TYPE 2 DIABETES MELLITUS WITHOUT COMPLICATION, WITHOUT LONG-TERM CURRENT USE OF INSULIN (HCC): ICD-10-CM

## 2020-09-02 DIAGNOSIS — K76.89 HEPATIC CYST: ICD-10-CM

## 2020-09-02 DIAGNOSIS — Z72.0 TOBACCO ABUSE: ICD-10-CM

## 2020-09-02 DIAGNOSIS — K30 CHRONIC UPSET STOMACH: ICD-10-CM

## 2020-09-02 DIAGNOSIS — F31.4 BIPOLAR DISORDER, CURRENT EPISODE DEPRESSED, SEVERE, WITHOUT PSYCHOTIC FEATURES (HCC): Primary | ICD-10-CM

## 2020-09-02 LAB
ALBUMIN SERPL BCP-MCNC: 4.3 G/DL (ref 3–5.2)
ALP SERPL-CCNC: 112 U/L (ref 43–122)
ALT SERPL W P-5'-P-CCNC: 57 U/L (ref 9–52)
AMPHETAMINES SERPL QL SCN: NEGATIVE
ANION GAP SERPL CALCULATED.3IONS-SCNC: 8 MMOL/L (ref 5–14)
APAP SERPL-MCNC: <10 UG/ML (ref 10–20)
AST SERPL W P-5'-P-CCNC: 37 U/L (ref 14–36)
BARBITURATES UR QL: NEGATIVE
BASOPHILS # BLD AUTO: 0.17 THOUSAND/UL (ref 0–0.1)
BASOPHILS NFR MAR MANUAL: 1 % (ref 0–1)
BENZODIAZ UR QL: POSITIVE
BILIRUB SERPL-MCNC: 1.9 MG/DL
BILIRUB UR QL STRIP: NEGATIVE
BUN SERPL-MCNC: 7 MG/DL (ref 5–25)
CALCIUM SERPL-MCNC: 9.7 MG/DL (ref 8.4–10.2)
CHLORIDE SERPL-SCNC: 105 MMOL/L (ref 97–108)
CLARITY UR: ABNORMAL
CO2 SERPL-SCNC: 24 MMOL/L (ref 22–30)
COCAINE UR QL: NEGATIVE
COLOR UR: ABNORMAL
CREAT SERPL-MCNC: 0.67 MG/DL (ref 0.6–1.2)
ERYTHROCYTE [DISTWIDTH] IN BLOOD BY AUTOMATED COUNT: 14.9 %
ETHANOL EXG-MCNC: 0 MG/DL
GFR SERPL CREATININE-BSD FRML MDRD: 101 ML/MIN/1.73SQ M
GLUCOSE SERPL-MCNC: 130 MG/DL (ref 70–99)
GLUCOSE UR STRIP-MCNC: NEGATIVE MG/DL
HCT VFR BLD AUTO: 40.1 % (ref 36–46)
HGB BLD-MCNC: 13.9 G/DL (ref 12–16)
HGB UR QL STRIP.AUTO: NEGATIVE
KETONES UR STRIP-MCNC: NEGATIVE MG/DL
LEUKOCYTE ESTERASE UR QL STRIP: NEGATIVE
LITHIUM SERPL-SCNC: 0.3 MMOL/L (ref 0.6–1.2)
LYMPHOCYTES # BLD AUTO: 18 % (ref 25–45)
LYMPHOCYTES # BLD AUTO: 2.97 THOUSAND/UL (ref 0.5–4)
MCH RBC QN AUTO: 33 PG (ref 26–34)
MCHC RBC AUTO-ENTMCNC: 34.8 G/DL (ref 31–36)
MCV RBC AUTO: 95 FL (ref 80–100)
METHADONE UR QL: NEGATIVE
MONOCYTES # BLD AUTO: 0.5 THOUSAND/UL (ref 0.2–0.9)
MONOCYTES NFR BLD AUTO: 3 % (ref 1–10)
NEUTS BAND NFR BLD MANUAL: 1 % (ref 0–8)
NEUTS SEG # BLD: 12.87 THOUSAND/UL (ref 1.8–7.8)
NEUTS SEG NFR BLD AUTO: 77 %
NITRITE UR QL STRIP: NEGATIVE
OPIATES UR QL SCN: NEGATIVE
OXYCODONE+OXYMORPHONE UR QL SCN: NEGATIVE
PCP UR QL: NEGATIVE
PH UR STRIP.AUTO: 6 [PH]
PLATELET # BLD AUTO: 417 THOUSANDS/UL (ref 150–450)
PLATELET BLD QL SMEAR: ABNORMAL
PMV BLD AUTO: 7.5 FL (ref 8.9–12.7)
POTASSIUM SERPL-SCNC: 3.3 MMOL/L (ref 3.6–5)
PROT SERPL-MCNC: 7.7 G/DL (ref 5.9–8.4)
PROT UR STRIP-MCNC: NEGATIVE MG/DL
RBC # BLD AUTO: 4.22 MILLION/UL (ref 4–5.2)
RBC MORPH BLD: NORMAL
SALICYLATES SERPL-MCNC: <1 MG/DL (ref 10–30)
SARS-COV-2 RNA RESP QL NAA+PROBE: NEGATIVE
SODIUM SERPL-SCNC: 137 MMOL/L (ref 137–147)
SP GR UR STRIP.AUTO: 1.01 (ref 1–1.04)
THC UR QL: POSITIVE
TOTAL CELLS COUNTED SPEC: 100
TSH SERPL DL<=0.05 MIU/L-ACNC: 9.93 UIU/ML (ref 0.47–4.68)
UROBILINOGEN UA: 1 MG/DL
WBC # BLD AUTO: 16.5 THOUSAND/UL (ref 4.5–11)

## 2020-09-02 PROCEDURE — 99284 EMERGENCY DEPT VISIT MOD MDM: CPT | Performed by: EMERGENCY MEDICINE

## 2020-09-02 PROCEDURE — 36415 COLL VENOUS BLD VENIPUNCTURE: CPT | Performed by: EMERGENCY MEDICINE

## 2020-09-02 PROCEDURE — 71045 X-RAY EXAM CHEST 1 VIEW: CPT

## 2020-09-02 PROCEDURE — 74177 CT ABD & PELVIS W/CONTRAST: CPT

## 2020-09-02 PROCEDURE — G1004 CDSM NDSC: HCPCS

## 2020-09-02 PROCEDURE — 80329 ANALGESICS NON-OPIOID 1 OR 2: CPT | Performed by: EMERGENCY MEDICINE

## 2020-09-02 PROCEDURE — 99285 EMERGENCY DEPT VISIT HI MDM: CPT

## 2020-09-02 PROCEDURE — 82075 ASSAY OF BREATH ETHANOL: CPT | Performed by: EMERGENCY MEDICINE

## 2020-09-02 PROCEDURE — 83036 HEMOGLOBIN GLYCOSYLATED A1C: CPT | Performed by: PHYSICIAN ASSISTANT

## 2020-09-02 PROCEDURE — 80307 DRUG TEST PRSMV CHEM ANLYZR: CPT | Performed by: EMERGENCY MEDICINE

## 2020-09-02 PROCEDURE — 81003 URINALYSIS AUTO W/O SCOPE: CPT | Performed by: EMERGENCY MEDICINE

## 2020-09-02 PROCEDURE — 84443 ASSAY THYROID STIM HORMONE: CPT | Performed by: EMERGENCY MEDICINE

## 2020-09-02 PROCEDURE — 80053 COMPREHEN METABOLIC PANEL: CPT | Performed by: EMERGENCY MEDICINE

## 2020-09-02 PROCEDURE — 85007 BL SMEAR W/DIFF WBC COUNT: CPT | Performed by: EMERGENCY MEDICINE

## 2020-09-02 PROCEDURE — 93005 ELECTROCARDIOGRAM TRACING: CPT

## 2020-09-02 PROCEDURE — 84481 FREE ASSAY (FT-3): CPT | Performed by: PSYCHIATRY & NEUROLOGY

## 2020-09-02 PROCEDURE — 87635 SARS-COV-2 COVID-19 AMP PRB: CPT | Performed by: EMERGENCY MEDICINE

## 2020-09-02 PROCEDURE — 85027 COMPLETE CBC AUTOMATED: CPT | Performed by: EMERGENCY MEDICINE

## 2020-09-02 PROCEDURE — 80178 ASSAY OF LITHIUM: CPT | Performed by: EMERGENCY MEDICINE

## 2020-09-02 RX ORDER — HYDROXYZINE HYDROCHLORIDE 25 MG/1
25 TABLET, FILM COATED ORAL ONCE
Status: DISCONTINUED | OUTPATIENT
Start: 2020-09-02 | End: 2020-09-02

## 2020-09-02 RX ORDER — POTASSIUM CHLORIDE 750 MG/1
20 TABLET, EXTENDED RELEASE ORAL ONCE
Status: COMPLETED | OUTPATIENT
Start: 2020-09-03 | End: 2020-09-03

## 2020-09-02 RX ORDER — LORAZEPAM 0.5 MG/1
1 TABLET ORAL ONCE
Status: COMPLETED | OUTPATIENT
Start: 2020-09-02 | End: 2020-09-02

## 2020-09-02 RX ORDER — HYDROXYZINE HYDROCHLORIDE 25 MG/1
50 TABLET, FILM COATED ORAL ONCE
Status: COMPLETED | OUTPATIENT
Start: 2020-09-02 | End: 2020-09-02

## 2020-09-02 RX ADMIN — IOHEXOL 100 ML: 350 INJECTION, SOLUTION INTRAVENOUS at 17:09

## 2020-09-02 RX ADMIN — LORAZEPAM 1 MG: 0.5 TABLET ORAL at 20:36

## 2020-09-02 RX ADMIN — HYDROXYZINE HYDROCHLORIDE 50 MG: 25 TABLET, FILM COATED ORAL at 20:36

## 2020-09-02 NOTE — ED NOTES
Pt resting on left side on stretcher with eyes closed  Resp even and unlabored  There are no signs of distress or discomfort  This nurse received report from [employer]  This nurse to continue monitoring        Ira Linares RN  09/02/20 1949

## 2020-09-02 NOTE — ED NOTES
Sherry Pascual presented to the ED due to increased anxiety  Patient at times was unable to talk due to anxiety, all that she could get out was that "I can't speak"  Patient denies current SI but reports that she has had suicide attempts in the past most recent overdose on medications  She reported that she is not taking her medications all the time  Denies HI/AV hallucinations, decreased ADL's and caring for herself  She stated that she lives with her son who is 27years old  Current services for psychiatrist and therapist through [facility]  She is stating that she is really scared and paranoid and feels safer in the hospital she does not not feel safe going home and would like to sign herself in on a 201      JK-CIS

## 2020-09-02 NOTE — ED CARE HANDOFF
Emergency Department Sign Out Note        Sign out and transfer of care from Dr Dennis Daniel  See Separate Emergency Department note  The patient, Orivlle Rand, was evaluated by the previous provider for Axiety, Psych Eval     Workup Completed:  MSE, Labs ordered, Imaging ordered    ED Course / Workup Pending (followup): Patient here for evaluation of anxiety and concerns her son is stealing medication  Elevated WBC  CXR and CT pending  Once medically cleared can be seen          Patient medically cleared  Signed 201  Awaiting bed assignment  Will continue to monitor for safety in ED  ED Course as of Sep 02 1748   Wed Sep 02, 2020   1723 CXR okay per radiology reading  200 CT okay  Patient is medically cleared for crisis evaluation  Procedures  MDM    Disposition  Final diagnoses:   Anxiety   Hepatic cyst     Time reflects when diagnosis was documented in both MDM as applicable and the Disposition within this note     Time User Action Codes Description Comment    9/2/2020  3:46 PM Mignon Jordan Add [F41 9] Anxiety     9/2/2020  5:44 PM Chandni Krueger Add [K76 89] Hepatic cyst       ED Disposition     None      Follow-up Information    None       Patient's Medications   Discharge Prescriptions    No medications on file     No discharge procedures on file         ED Provider  Electronically Signed by     Genie Bashir DO  09/02/20 9177

## 2020-09-02 NOTE — ED PROVIDER NOTES
History  Chief Complaint   Patient presents with    Anxiety     all day today  She has a Rx for Ativan, but her son took it and is holding it to make sure she doesn't take it all at once, due to her prior history     HPI      This is a 61-year-old female who has a longstanding history of psychiatric challenges and severe anxiety where she has been either admitted or seen evaluated multiple times at this facility and other facilities within the network in the last 2 calendar months  Patient is concerned that her son is keeping her Klonopin away from her because she has history of overdosing this in the past   Patient also has a longstanding history of alcohol abuse  Patient does not report any particular stressors that great anxiety for her but she stating that she needs her medication  Prior to Admission Medications   Prescriptions Last Dose Informant Patient Reported? Taking?    amLODIPine (NORVASC) 5 mg tablet   No No   Sig: TAKE 1 TABLET BY MOUTH DAILY   busPIRone (BUSPAR) 10 mg tablet   No No   Sig: Take 2 tablets (20 mg total) by mouth 3 (three) times a day At 9am, 4pm, 9pm   clonazePAM (KlonoPIN) 1 mg tablet   No No   Sig: Take 1 tablet (1 mg total) by mouth 3 (three) times a day At 9am, 4pm, 9pm   escitalopram (LEXAPRO) 5 mg tablet   No No   Sig: Take 1 tablet (5 mg total) by mouth daily At 9am   gabapentin (NEURONTIN) 400 mg capsule   No No   Sig: Take 2 capsules (800 mg total) by mouth 4 (four) times a day   glipiZIDE (GLUCOTROL) 5 mg tablet   No No   Sig: Take 1 tablet (5 mg total) by mouth daily before breakfast   lithium carbonate 150 mg capsule   No No   Sig: Take 3 capsules (450 mg total) by mouth daily at bedtime   lithium carbonate 300 mg capsule   No No   Sig: Take 1 capsule (300 mg total) by mouth every morning   metoprolol tartrate (LOPRESSOR) 25 mg tablet   No No   Sig: Take 0 5 tablets (12 5 mg total) by mouth every 12 (twelve) hours   pantoprazole (PROTONIX) 40 mg tablet   No No   Sig: Take 1 tablet (40 mg total) by mouth daily in the early morning   sucralfate (CARAFATE) 1 g tablet   No No   Sig: Take 1 tablet (1 g total) by mouth 4 (four) times a day (before meals and at bedtime)   traZODone (DESYREL) 100 mg tablet   No No   Sig: Take 1 tablet (100 mg total) by mouth daily at bedtime as needed for sleep      Facility-Administered Medications: None       Past Medical History:   Diagnosis Date    Alcohol abuse     Alcoholism ([place] [address] )     Anxiety     Bipolar disorder ([place] [address] )     Bowel obstruction ([place] [address] )     Depression     Gastritis     Head injury     Heart palpitations     Hyperlipidemia     Hypertension     Hypothyroidism     Psychiatric illness     PTSD (post-traumatic stress disorder)     Seizure ([place] [address] )     Sleep difficulties     Suicide attempt [facility])        Past Surgical History:   Procedure Laterality Date    ABDOMINAL SURGERY      APPENDECTOMY      BOWEL RESECTION      CHOLECYSTECTOMY      ESOPHAGOGASTRODUODENOSCOPY N/A 5/18/2018    Procedure: ESOPHAGOGASTRODUODENOSCOPY (EGD) with bx;  Surgeon: Roxanne Kurtz DO;  Location: [facility]; Service: Gastroenterology    HYSTERECTOMY      KNEE SURGERY Bilateral        Family History   Problem Relation Age of Onset    Diabetes Father      I have reviewed and agree with the history as documented  E-Cigarette/Vaping    E-Cigarette Use Never User      E-Cigarette/Vaping Substances    Nicotine No     THC No     CBD No     Flavoring No     Other No     Unknown No      Social History     Tobacco Use    Smoking status: Current Every Day Smoker     Packs/day: 0 50     Years: 25 00     Pack years: 12 50     Types: Cigarettes    Smokeless tobacco: Never Used   Substance Use Topics    Alcohol use: Not Currently     Alcohol/week: 6 0 standard drinks     Types: 6 Cans of beer per week     Frequency: 4 or more times a week     Drinks per session: 5 or 6     Binge frequency: Weekly     Comment: last drink on 08/15/20    Drug use:  No Review of Systems   Constitutional: Negative  HENT: Negative  Eyes: Negative  Respiratory: Negative  Cardiovascular: Negative  Gastrointestinal: Negative  Endocrine: Negative  Genitourinary: Negative  Musculoskeletal: Negative  Skin: Negative  Allergic/Immunologic: Negative  Neurological: Negative  Hematological: Negative  Psychiatric/Behavioral: Positive for confusion and dysphoric mood  Negative for self-injury and suicidal ideas  The patient is nervous/anxious  Physical Exam  Physical Exam  Vitals signs and nursing note reviewed  Constitutional:       Appearance: She is obese  Comments: Appears older than stated age  HENT:      Head: Normocephalic and atraumatic  Right Ear: Tympanic membrane, ear canal and external ear normal       Left Ear: Tympanic membrane, ear canal and external ear normal       Nose: Nose normal       Mouth/Throat:      Mouth: Mucous membranes are dry  Eyes:      Extraocular Movements: Extraocular movements intact  Conjunctiva/sclera: Conjunctivae normal       Pupils: Pupils are equal, round, and reactive to light  Neck:      Musculoskeletal: Normal range of motion  Cardiovascular:      Rate and Rhythm: Normal rate  Pulses: Normal pulses  Pulmonary:      Effort: Pulmonary effort is normal    Abdominal:      General: Abdomen is flat  Bowel sounds are normal    Skin:     Capillary Refill: Capillary refill takes less than 2 seconds  Neurological:      General: No focal deficit present  Mental Status: She is alert and oriented to person, place, and time  Mental status is at baseline  Psychiatric:         Attention and Perception: She is inattentive  Mood and Affect: Mood is anxious and depressed  Affect is blunt and flat  Speech: She is communicative  Speech is delayed  Behavior: Behavior normal  Behavior is not agitated or slowed           Vital Signs  ED Triage Vitals [09/02/20 1500]   Temperature Pulse Respirations Blood Pressure SpO2   (!) 97 1 °F (36 2 °C) 95 22 121/71 95 %      Temp Source Heart Rate Source Patient Position - Orthostatic VS BP Location FiO2 (%)   Tympanic -- -- -- --      Pain Score       --           Vitals:    09/02/20 1500   BP: 121/71   Pulse: 95         Visual Acuity      ED Medications  Medications - No data to display    Diagnostic Studies  Results Reviewed     Procedure Component Value Units Date/Time    CBC and differential [[id number]]  (Abnormal) Collected:  09/02/20 1538    Lab Status:  Final result Specimen:  Blood from Arm, Right Updated:  09/02/20 1548     WBC 16 50 Thousand/uL      RBC 4 22 Million/uL      Hemoglobin 13 9 g/dL      Hematocrit 40 1 %      MCV 95 fL      MCH 33 0 pg      MCHC 34 8 g/dL      RDW 14 9 %      MPV 7 5 fL      Platelets 556 Thousands/uL     Novel Coronavirus [facility]),PCR [facility] [[id number]] Collected:  09/02/20 1544    Lab Status:  No result Specimen:  Nares from Nose     Lithium level [[id number]] Collected:  09/02/20 1538    Lab Status: In process Specimen:  Blood from Arm, Right Updated:  09/02/20 1542    TSH [[id number]] Collected:  09/02/20 1538    Lab Status: In process Specimen:  Blood from Arm, Right Updated:  09/02/20 1542    Comprehensive metabolic panel [[id number]] Collected:  09/02/20 1538    Lab Status: In process Specimen:  Blood from Arm, Right Updated:  09/02/20 1542    Acetaminophen level-"If concentration is detectable, please discuss with medical  on call " [[id number]] Collected:  09/02/20 1538    Lab Status: In process Specimen:  Blood from Arm, Right Updated:  48/28/35 1788    Salicylate level [[id number]] Collected:  09/02/20 1538    Lab Status:   In process Specimen:  Blood from Arm, Right Updated:  09/02/20 1542    POCT alcohol breath test [[id number]]  (Normal) Resulted:  09/02/20 1535    Lab Status:  Final result Updated:  09/02/20 1535     EXTBreath Alcohol 0 000    Rapid drug screen, urine [[id number]]     Lab Status:  No result Specimen:  Urine     UA w Reflex to Microscopic w Reflex to Culture [[id number]]     Lab Status:  No result Specimen:  Urine                  No orders to display              Procedures  ECG 12 Lead Documentation Only    Date/Time: 9/2/2020 4:06 PM  Performed by: Jennifer Delacruz III, DO  Authorized by: Jennifer Delacruz III, DO     Indications / Diagnosis:  Psych clearance  ECG reviewed by me, the ED Provider: yes    Patient location:  ED  Comments:      I personally reviewed this EKG is performed the patient September 2, 2020  EKG was completed at 4:03 p m  And interpreted by me at 4:06 p m   Normal sinus rhythm with no acute ST abnormalities  Please note that the patient has a QT interval 481 which is consistent with prolonged QT  ED Course  ED Course as of Sep 02 1633   Wed Sep 02, 2020   1523 History and physical completed  Orders placed  This is a 79-year-old female with a longstanding history of anxiety presents the emergency department with anxiety without suicidal ideations and homicidal tendencies  Patient has been known to consume alcohol on a regular basis and to consume a large amount of her medications at once will proceed with a toxicological workup  1611 Noted elevated LFT's, normal UA, elevated LFT's, with a leukocytosis, will proceed with CT A/P and CXR to assess for above lab abnormalities  1614 Case signed out to Dr Chelsea Valle will follow up with CXR and A/P to determine if pt is medically cleared, if yes, then pt will need to be seen by crisis  US AUDIT      Most Recent Value   Initial Alcohol Screen: US AUDIT-C    1  How often do you have a drink containing alcohol? 0 [pt states she recently quit drinking] Filed at: 09/02/2020 1501   2  How many drinks containing alcohol do you have on a typical day you are drinking? 0 Filed at: 09/02/2020 1501   3a  Male UNDER 65:  How often do you have five or more drinks on one occasion? 0 Filed at: 09/02/2020 1501   3b  FEMALE Any Age, or MALE 65+: How often do you have 4 or more drinks on one occassion? 0 Filed at: 09/02/2020 1501   Audit-C Score  0 Filed at: 09/02/2020 1501                  MUMTAZ/DAST-10      Most Recent Value   How many times in the past year have you    Used an illegal drug or used a prescription medication for non-medical reasons? Never Filed at: 09/02/2020 1501                                [facility]  Number of Diagnoses or Management Options  Anxiety:   Diagnosis management comments: This is a 62-year-old female who has a longstanding history of anxiety on multiple medications presents the emergency department increased anxiety  Patient has extremely flat affect  Patient will need to have a medical workup: DM hx, etc  Will consult psych / crisis  Patient signed out to on coming team     Portions of the record may have been created with voice recognition software  Occasional wrong word or "sound a like" substitutions may have occurred due to the inherent limitations of voice recognition software  Read the chart carefully and recognize, using context, where substitutions have occurred  Disposition  Final diagnoses:   Anxiety     Time reflects when diagnosis was documented in both MDM as applicable and the Disposition within this note     Time User Action Codes Description Comment    9/2/2020  3:46 PM Mignon Jordan Add [F41 9] Anxiety       ED Disposition     None      Follow-up Information    None         Patient's Medications   Discharge Prescriptions    No medications on file     No discharge procedures on file      PDMP Review       Value Time User    PDMP Reviewed  Yes 8/24/2020 11:05 AM Mildred Watters DO          ED Provider  Electronically Signed by           Jazmin Sena III, DO  09/02/20 Rosario Wyatt [address] Dennis Daniel III, DO  09/02/20 6627

## 2020-09-03 PROBLEM — F31.9: Status: ACTIVE | Noted: 2020-09-03

## 2020-09-03 PROBLEM — Z91.148 NON COMPLIANCE W MEDICATION REGIMEN: Status: ACTIVE | Noted: 2020-09-03

## 2020-09-03 PROBLEM — I10 HYPERTENSION: Status: ACTIVE | Noted: 2020-09-03

## 2020-09-03 PROBLEM — IMO0002 DIABETES TYPE 2, UNCONTROLLED: Status: ACTIVE | Noted: 2020-09-03

## 2020-09-03 PROBLEM — E03.9 HYPOTHYROIDISM: Status: ACTIVE | Noted: 2020-09-03

## 2020-09-03 PROBLEM — E78.5 HYPERLIPEMIA: Status: ACTIVE | Noted: 2020-09-03

## 2020-09-03 PROBLEM — Z91.14 NON COMPLIANCE W MEDICATION REGIMEN: Status: ACTIVE | Noted: 2020-09-03

## 2020-09-03 PROBLEM — F06.8: Status: ACTIVE | Noted: 2020-09-03

## 2020-09-03 PROBLEM — F13.20 BENZODIAZEPINE DEPENDENCE, CONTINUOUS (HCC): Status: ACTIVE | Noted: 2020-09-03

## 2020-09-03 PROBLEM — Z72.0 TOBACCO ABUSE: Status: ACTIVE | Noted: 2020-09-03

## 2020-09-03 PROBLEM — F31.30 BIPOLAR DISORDER CURRENT EPISODE DEPRESSED (HCC): Status: ACTIVE | Noted: 2020-05-12

## 2020-09-03 PROBLEM — Z00.8 MEDICAL CLEARANCE FOR PSYCHIATRIC ADMISSION: Status: ACTIVE | Noted: 2020-09-03

## 2020-09-03 PROBLEM — F31.9 BIPOLAR I DISORDER WITH ANXIOUS DISTRESS (HCC): Status: ACTIVE | Noted: 2020-09-03

## 2020-09-03 PROBLEM — R74.01 TRANSAMINITIS: Status: ACTIVE | Noted: 2020-09-03

## 2020-09-03 PROBLEM — E87.6 HYPOKALEMIA: Status: ACTIVE | Noted: 2020-09-03

## 2020-09-03 LAB
ATRIAL RATE: 83 BPM
EST. AVERAGE GLUCOSE BLD GHB EST-MCNC: 148 MG/DL
GLUCOSE SERPL-MCNC: 159 MG/DL (ref 65–140)
GLUCOSE SERPL-MCNC: 161 MG/DL (ref 65–140)
GLUCOSE SERPL-MCNC: 170 MG/DL (ref 65–140)
HBA1C MFR BLD: 6.8 %
P AXIS: 51 DEGREES
PR INTERVAL: 184 MS
QRS AXIS: 9 DEGREES
QRSD INTERVAL: 78 MS
QT INTERVAL: 410 MS
QTC INTERVAL: 481 MS
T WAVE AXIS: 18 DEGREES
T3FREE SERPL-MCNC: 2.83 PG/ML (ref 2.3–4.2)
VENTRICULAR RATE: 83 BPM

## 2020-09-03 PROCEDURE — 82948 REAGENT STRIP/BLOOD GLUCOSE: CPT

## 2020-09-03 PROCEDURE — 99222 1ST HOSP IP/OBS MODERATE 55: CPT | Performed by: PHYSICIAN ASSISTANT

## 2020-09-03 PROCEDURE — 93010 ELECTROCARDIOGRAM REPORT: CPT | Performed by: INTERNAL MEDICINE

## 2020-09-03 PROCEDURE — 99221 1ST HOSP IP/OBS SF/LOW 40: CPT | Performed by: PSYCHIATRY & NEUROLOGY

## 2020-09-03 RX ORDER — GABAPENTIN 400 MG/1
400 CAPSULE ORAL 4 TIMES DAILY
Status: DISCONTINUED | OUTPATIENT
Start: 2020-09-03 | End: 2020-09-04

## 2020-09-03 RX ORDER — ACETAMINOPHEN 325 MG/1
650 TABLET ORAL EVERY 6 HOURS PRN
Status: DISCONTINUED | OUTPATIENT
Start: 2020-09-03 | End: 2020-09-07 | Stop reason: HOSPADM

## 2020-09-03 RX ORDER — OLANZAPINE 2.5 MG/1
2.5 TABLET ORAL ONCE AS NEEDED
Status: COMPLETED | OUTPATIENT
Start: 2020-09-03 | End: 2020-09-04

## 2020-09-03 RX ORDER — THIAMINE MONONITRATE (VIT B1) 100 MG
100 TABLET ORAL DAILY
Status: DISCONTINUED | OUTPATIENT
Start: 2020-09-03 | End: 2020-09-07 | Stop reason: HOSPADM

## 2020-09-03 RX ORDER — LITHIUM CARBONATE 300 MG/1
300 CAPSULE ORAL EVERY MORNING
Status: DISCONTINUED | OUTPATIENT
Start: 2020-09-03 | End: 2020-09-07 | Stop reason: HOSPADM

## 2020-09-03 RX ORDER — LORAZEPAM 1 MG/1
2 TABLET ORAL ONCE AS NEEDED
Status: DISCONTINUED | OUTPATIENT
Start: 2020-09-03 | End: 2020-09-03

## 2020-09-03 RX ORDER — GLIPIZIDE 5 MG/1
5 TABLET ORAL
Status: DISCONTINUED | OUTPATIENT
Start: 2020-09-03 | End: 2020-09-07 | Stop reason: HOSPADM

## 2020-09-03 RX ORDER — PANTOPRAZOLE SODIUM 20 MG/1
20 TABLET, DELAYED RELEASE ORAL
Status: DISCONTINUED | OUTPATIENT
Start: 2020-09-04 | End: 2020-09-07 | Stop reason: HOSPADM

## 2020-09-03 RX ORDER — CLONIDINE HYDROCHLORIDE 0.1 MG/1
0.1 TABLET ORAL DAILY
COMMUNITY
End: 2020-09-07 | Stop reason: HOSPADM

## 2020-09-03 RX ORDER — POTASSIUM CHLORIDE 20 MEQ/1
20 TABLET, EXTENDED RELEASE ORAL 2 TIMES DAILY
Status: COMPLETED | OUTPATIENT
Start: 2020-09-03 | End: 2020-09-05

## 2020-09-03 RX ORDER — OLANZAPINE 10 MG/1
5 INJECTION, POWDER, LYOPHILIZED, FOR SOLUTION INTRAMUSCULAR ONCE AS NEEDED
Status: DISCONTINUED | OUTPATIENT
Start: 2020-09-03 | End: 2020-09-07 | Stop reason: HOSPADM

## 2020-09-03 RX ORDER — CLONAZEPAM 1 MG/1
1 TABLET ORAL 2 TIMES DAILY
Status: DISCONTINUED | OUTPATIENT
Start: 2020-09-03 | End: 2020-09-04

## 2020-09-03 RX ORDER — FOLIC ACID 1 MG/1
1000 TABLET ORAL DAILY
Status: DISCONTINUED | OUTPATIENT
Start: 2020-09-03 | End: 2020-09-07 | Stop reason: HOSPADM

## 2020-09-03 RX ORDER — ESCITALOPRAM OXALATE 5 MG/1
5 TABLET ORAL DAILY
Status: DISCONTINUED | OUTPATIENT
Start: 2020-09-03 | End: 2020-09-03

## 2020-09-03 RX ORDER — OLANZAPINE 10 MG/1
10 INJECTION, POWDER, LYOPHILIZED, FOR SOLUTION INTRAMUSCULAR ONCE AS NEEDED
Status: DISCONTINUED | OUTPATIENT
Start: 2020-09-03 | End: 2020-09-07 | Stop reason: HOSPADM

## 2020-09-03 RX ORDER — PANTOPRAZOLE SODIUM 40 MG/1
40 TABLET, DELAYED RELEASE ORAL
Status: DISCONTINUED | OUTPATIENT
Start: 2020-09-03 | End: 2020-09-03

## 2020-09-03 RX ORDER — BUSPIRONE HYDROCHLORIDE 10 MG/1
20 TABLET ORAL 3 TIMES DAILY
Status: DISCONTINUED | OUTPATIENT
Start: 2020-09-03 | End: 2020-09-07 | Stop reason: HOSPADM

## 2020-09-03 RX ORDER — TRAZODONE HYDROCHLORIDE 50 MG/1
50 TABLET ORAL ONCE AS NEEDED
Status: COMPLETED | OUTPATIENT
Start: 2020-09-03 | End: 2020-09-05

## 2020-09-03 RX ORDER — LORAZEPAM 0.5 MG/1
0.5 TABLET ORAL ONCE AS NEEDED
Status: DISCONTINUED | OUTPATIENT
Start: 2020-09-03 | End: 2020-09-03

## 2020-09-03 RX ORDER — LORAZEPAM 1 MG/1
1 TABLET ORAL ONCE AS NEEDED
Status: DISCONTINUED | OUTPATIENT
Start: 2020-09-03 | End: 2020-09-03

## 2020-09-03 RX ADMIN — GABAPENTIN 400 MG: 400 CAPSULE ORAL at 13:44

## 2020-09-03 RX ADMIN — GABAPENTIN 400 MG: 400 CAPSULE ORAL at 21:02

## 2020-09-03 RX ADMIN — GABAPENTIN 400 MG: 400 CAPSULE ORAL at 17:45

## 2020-09-03 RX ADMIN — INSULIN LISPRO 1 UNITS: 100 INJECTION, SOLUTION INTRAVENOUS; SUBCUTANEOUS at 21:21

## 2020-09-03 RX ADMIN — ESCITALOPRAM 5 MG: 5 TABLET, FILM COATED ORAL at 13:44

## 2020-09-03 RX ADMIN — LITHIUM CARBONATE 300 MG: 300 CAPSULE, GELATIN COATED ORAL at 13:43

## 2020-09-03 RX ADMIN — POTASSIUM CHLORIDE 20 MEQ: 20 TABLET, EXTENDED RELEASE ORAL at 18:09

## 2020-09-03 RX ADMIN — LITHIUM CARBONATE 450 MG: 300 CAPSULE, GELATIN COATED ORAL at 21:02

## 2020-09-03 RX ADMIN — BUSPIRONE HYDROCHLORIDE 20 MG: 10 TABLET ORAL at 21:02

## 2020-09-03 RX ADMIN — BUSPIRONE HYDROCHLORIDE 20 MG: 10 TABLET ORAL at 13:51

## 2020-09-03 RX ADMIN — METOPROLOL TARTRATE 12.5 MG: 25 TABLET ORAL at 13:45

## 2020-09-03 RX ADMIN — POTASSIUM CHLORIDE 20 MEQ: 750 TABLET, EXTENDED RELEASE ORAL at 00:04

## 2020-09-03 RX ADMIN — Medication 1 TABLET: at 14:00

## 2020-09-03 RX ADMIN — INSULIN LISPRO 1 UNITS: 100 INJECTION, SOLUTION INTRAVENOUS; SUBCUTANEOUS at 17:00

## 2020-09-03 RX ADMIN — Medication 100 MG: at 14:01

## 2020-09-03 RX ADMIN — METOPROLOL TARTRATE 12.5 MG: 25 TABLET ORAL at 21:02

## 2020-09-03 RX ADMIN — FOLIC ACID 1000 MCG: 1 TABLET ORAL at 14:01

## 2020-09-03 RX ADMIN — CLONAZEPAM 1 MG: 1 TABLET ORAL at 15:51

## 2020-09-03 NOTE — ASSESSMENT & PLAN NOTE
· Potassium level (09/02/20): 3 3  · Patient on potassium supplement 20 mEq BID    · Concern if potassium drops below 3 0

## 2020-09-03 NOTE — ED NOTES
Pt resting on stretcher with eyes closed  Resp even and unlabored  There are no signs of distress or discomfort  This nurse to continue monitoring        Yash Edwards RN  09/02/20 3199

## 2020-09-03 NOTE — PROGRESS NOTES
Patient arrived at the nurses station crying and saying she was waiting for Klonopin  The medication had been ordered and was given but she said she is not receiving the right amount "I'm supposed to get 2mgs  "

## 2020-09-03 NOTE — PROGRESS NOTES
Patient has had several episodes in the bathroom  She immediately falls asleep when returning to bed

## 2020-09-03 NOTE — PROGRESS NOTES
Patient incontinent while sleeping this morning  Showered, bedding and clothing changed  Patient flat, depressed and continues to report that her main complaint is anxiety  Denies SI  Speech delayed  States that her son doesn't feel that she needs to take so many medications  Briefly about the unit  Cooperative  Will monitor

## 2020-09-03 NOTE — PROGRESS NOTES
09/03/20 [address]   Readmission [facility]   Who directed you to return to the hospital? Self   Did you get your prescriptions before you left the hospital? No   Reason: Preference for own pharmacy   Were you able to get your prescriptions filled when you left the hospital? Yes   Did you take your medications as prescribed? No   Were you able to get to your follow-up appointments? No   Reason: Compliance   Patient Information   Mental Status Confused   Primary Caregiver Self   Support System Immediate family   Legal Information   Tx Plan Signed Yes   Current Status: 201   Legal Issues denies   Advance Directives Status Discussed - Patient/Family [address] Proxy Appointed No   Activities of Daily Living Prior to Admission   Functional Status Independent   Assistive Device No device needed   Living Arrangement Apartment;Lives with someone   Ambulation Independent   Access to Firearms   Access to Firearms No   Income Information   Income Source SSI/SSD   [place]   Means of Transport to Appts: Family   This patient is a 30 day readmission and was most recently discharged on: 7/22-8/3, 8/21-8/22    The previous discharge plan was: [place] and to live with son who would dispurse Klonopin for PT    The [facility] Readmission Risk score is: 28    The identified triggers/events leading up to this admission include: did not have all of medications, pt was asking for more Klonopin that prescribed and son was not willing to give and became agitated  Initial Plans for this admission (and who will be involved in treatment and discharge planning include: Son will be needed for collateral information   ICM, outpatient, ACT referral if PT agrees

## 2020-09-03 NOTE — ED NOTES
Pt resting in room with eyes closed while laying supine on stretcher  Resp even and unlabored  There are no signs of distress or discomfort  This nurse to continue monitoring        Sim Antonio RN  09/02/20 8124

## 2020-09-03 NOTE — ED NOTES
Patient is accepted at [facility]  Patient is accepted by Dr Meghann Meek  Patient may go to the floor at [phone]  Nurse report is to be called to [phone] prior to patient transfer

## 2020-09-03 NOTE — CONSULTS
Consult- Kathy Martínez 1965, 48 y o  female MRN: [id number]  Unit/Bed#: [facility] 341-01 Encounter: [id number]  Primary Care Provider: YONG Plasencia   Date and time admitted to hospital: 9/2/2020  2:57 PM    Inpatient consult for Medical Clearance for [address] patient  Consult performed by: Maddy Gr PA-C  Consult ordered by: Elizabeth Powell, DO        Medical clearance for psychiatric admission  Assessment & Plan   Patient is medically cleared for admission to [facility] Notice and treatment of underlying psychiatric illness   No acute medical needs  Medicine will sign off  Please call with additional questions or concerns  Tobacco abuse  Assessment & Plan  · Patient smokes 1/2 ppd  · Encourage cessation  · Nicotine patch  Hypokalemia  Assessment & Plan  · Potassium level (09/02/20): 3 3  · Patient on potassium supplement 20 mEq BID  · Concern if potassium drops below 3 0    Acquired hypothyroidism  Assessment & Plan  · TSH slightly elevated (09/02/20) @ 9 930  · Check free T4  · Continue Synthroid 50 mcg daily for now  Type 2 diabetes mellitus without complication, without long-term current use of insulin (HCC)  Assessment & Plan  Lab Results   Component Value Date    HGBA1C 7 0 (H) 05/14/2020     Recent Labs     09/03/20  1050   POCGLU 161*     Blood Sugar Average: Last 72 hrs:  (P) 161     · Home regimen includes Glipizide 5 mg daily, which can be continued here  · Add SSI and accu-checks  · ADA diet  · Check updated A1c  Essential hypertension  Assessment & Plan  · Home regimen includes metoprolol 50 mg BID and amlodipine 5 mg daily, which should be continued      * Bipolar I disorder with anxious distress ([facility] [address] )  Assessment & Plan  · Per psychiatry    ECT Clearance:   History of recent seizure or stroke:  No   History of pheochromocytoma:  No   History of active bleeding (Intracranial hemorrhage, aneurysm or AVM):  No   History of metallic implants in the head or neck:  No   History of increased intracranial pressure with mass effect:  No   Does the patient have a current arrhythmia? No      Based on above criteria, Patient is medically cleared for ECT should it be recommended  Counseling / Coordination of Care Time: 30 minutes  Greater than 50% of total time spent on patient counseling and coordination of care  Collaboration of Care: Were Recommendations Directly Discussed with Primary Treatment Team? - Yes     History of Present Illness:    Jose Wall is a 48 y o  female who is originally admitted to the psychiatry service due to worsening anxiety  We are consulted for medical clearance for admission to [facility] Unit and treatment of underlying psychiatric illness  Patient has a PMH that includes diabetes, HTN, tobacco abuse and hypothyroidism  Currently, the patient is a bit anxious about not receiving all her medications  Review of Systems:    Review of Systems   Constitutional: Positive for fatigue  Negative for appetite change, chills, diaphoresis, fever and unexpected weight change  HENT: Negative for congestion and sore throat  Eyes: Negative for visual disturbance  Respiratory: Negative for cough, chest tightness, shortness of breath and wheezing  Cardiovascular: Negative for chest pain, palpitations and leg swelling  Gastrointestinal: Negative for abdominal distention, abdominal pain, blood in stool, constipation, diarrhea, nausea and vomiting  Genitourinary: Negative for difficulty urinating, dysuria, frequency, hematuria and urgency  Musculoskeletal: Negative for back pain, gait problem and neck pain  Skin: Negative for rash  Neurological: Negative for dizziness, tremors, speech difficulty, weakness, light-headedness, numbness and headaches  Psychiatric/Behavioral: Negative for confusion  The patient is nervous/anxious  All other systems reviewed and are negative      Past Medical and Surgical History:     Past Medical History: Diagnosis Date    Alcohol abuse     Alcoholism ([facility] [address] )     Anxiety     Bipolar disorder (HCC)     Bowel obstruction (HCC)     Depression     Gastritis     Head injury     Heart palpitations     Hyperlipidemia     Hypertension     Hypothyroidism     Psychiatric illness     PTSD (post-traumatic stress disorder)     [facility]     Sleep difficulties     Suicide attempt [facility])        Past Surgical History:   Procedure Laterality Date    ABDOMINAL SURGERY      APPENDECTOMY      BOWEL RESECTION      CHOLECYSTECTOMY      ESOPHAGOGASTRODUODENOSCOPY N/A 5/18/2018    Procedure: ESOPHAGOGASTRODUODENOSCOPY (EGD) with bx;  Surgeon: Alden Haney DO;  Location: [facility]; Service: Gastroenterology    HYSTERECTOMY      KNEE SURGERY Bilateral        Meds/Allergies:    PTA meds:   Prior to Admission Medications   Prescriptions Last Dose Informant Patient Reported? Taking?    amLODIPine (NORVASC) 5 mg tablet   No No   Sig: TAKE 1 TABLET BY MOUTH DAILY   Patient taking differently: No sig reported   busPIRone (BUSPAR) 10 mg tablet   No Yes   Sig: Take 2 tablets (20 mg total) by mouth 3 (three) times a day At 9am, 4pm, 9pm   cloNIDine (CATAPRES) 0 1 mg tablet   Yes Yes   Sig: Take 0 1 mg by mouth daily   clonazePAM (KlonoPIN) 1 mg tablet   No Yes   Sig: Take 1 tablet (1 mg total) by mouth 3 (three) times a day At 9am, 4pm, 9pm   escitalopram (LEXAPRO) 5 mg tablet   No Yes   Sig: Take 1 tablet (5 mg total) by mouth daily At 9am   gabapentin (NEURONTIN) 400 mg capsule   No Yes   Sig: Take 2 capsules (800 mg total) by mouth 4 (four) times a day   glipiZIDE (GLUCOTROL) 5 mg tablet   No Yes   Sig: Take 1 tablet (5 mg total) by mouth daily before breakfast   lithium carbonate 150 mg capsule   No Yes   Sig: Take 3 capsules (450 mg total) by mouth daily at bedtime   lithium carbonate 300 mg capsule   No Yes   Sig: Take 1 capsule (300 mg total) by mouth every morning   metoprolol tartrate (LOPRESSOR) 25 mg tablet No No   Sig: Take 0 5 tablets (12 5 mg total) by mouth every 12 (twelve) hours   Patient taking differently: Take 50 mg by mouth every 12 (twelve) hours    pantoprazole (PROTONIX) 40 mg tablet   No No   Sig: Take 1 tablet (40 mg total) by mouth daily in the early morning   Patient taking differently: Take 40 mg by mouth daily in the early morning    sucralfate (CARAFATE) 1 g tablet   No No   Sig: Take 1 tablet (1 g total) by mouth 4 (four) times a day (before meals and at bedtime)   Patient taking differently: Take 1 g by mouth 4 (four) times a day (before meals and at bedtime)    traZODone (DESYREL) 100 mg tablet   No Yes   Sig: Take 1 tablet (100 mg total) by mouth daily at bedtime as needed for sleep      Facility-Administered Medications: None     Allergies: Allergies   Allergen Reactions    Latex Rash     Social History:     Marital Status:     Substance Use History:   Social History     Substance and Sexual Activity   Alcohol Use Not Currently    Alcohol/week: 6 0 standard drinks    Types: 6 Cans of beer per week    Frequency: 4 or more times a week    Drinks per session: 5 or 6    Binge frequency: Weekly    Comment: last drink on 08/15/20     Social History     Tobacco Use   Smoking Status Current Every Day Smoker    Packs/day: 0 50    Years: 25 00    Pack years: 12 50    Types: Cigarettes   Smokeless Tobacco Never Used     Social History     Substance and Sexual Activity   Drug Use No     Family History:    Family History   Problem Relation Age of Onset    Diabetes Father        Physical Exam:     Vitals:   Blood Pressure: 137/81 (09/03/20 1345)  Pulse: 102 (09/03/20 1345)  Temperature: 98 6 °F (37 °C) (09/03/20 0045)  Temp Source: Temporal (09/03/20 0045)  Respirations: 16 (09/03/20 0045)  Height: 5' 3" (160 cm) (09/03/20 0045)  Weight - Scale: 91 4 kg (201 lb 6 4 oz) (09/03/20 0045)  SpO2: 96 % (09/03/20 0045)    Physical Exam  Vitals signs reviewed     HENT:      Head: Normocephalic and atraumatic  Eyes:      General: No scleral icterus  Cardiovascular:      Rate and Rhythm: Normal rate and regular rhythm  Heart sounds: No murmur  Pulmonary:      Breath sounds: Normal breath sounds  No wheezing or rales  Chest:      Chest wall: No tenderness  Abdominal:      General: Bowel sounds are normal  There is no distension  Palpations: Abdomen is soft  Tenderness: There is no abdominal tenderness  Musculoskeletal: Normal range of motion  Skin:     General: Skin is warm and dry  Findings: No rash  Additional Data:     Lab Results: I have personally reviewed pertinent reports  Results from last 7 days   Lab Units 09/02/20  1538   WBC Thousand/uL 16 50*   HEMOGLOBIN g/dL 13 9   HEMATOCRIT % 40 1   PLATELETS Thousands/uL 417   BANDS PCT % 1   LYMPHO PCT % 18*   MONO PCT % 3     Results from last 7 days   Lab Units 09/02/20  1538   SODIUM mmol/L 137   POTASSIUM mmol/L 3 3*   CHLORIDE mmol/L 105   CO2 mmol/L 24   BUN mg/dL 7   CREATININE mg/dL 0 67   ANION GAP mmol/L 8   CALCIUM mg/dL 9 7   ALBUMIN g/dL 4 3   TOTAL BILIRUBIN mg/dL 1 90*   ALK PHOS U/L 112   ALT U/L 57*   AST U/L 37*   GLUCOSE RANDOM mg/dL 130*         Lab Results   Component Value Date/Time    HGBA1C 7 0 (H) 05/14/2020 05:36 AM    HGBA1C 5 4 11/15/2019 05:48 AM    HGBA1C 5 6 05/26/2019 06:26 AM    HGBA1C 5 2 09/01/2018 06:24 AM     Results from last 7 days   Lab Units 09/03/20  1050 08/28/20  0802 08/27/20  2041 08/27/20  1606   POC GLUCOSE mg/dl 161* 178* 131 154*       EKG, Pathology, and Other Studies Reviewed on Admission:   · EKG (09/02/20): NSR with prolonged QTC @ 481    ** Please Note: This note has been constructed using a voice recognition system   **

## 2020-09-03 NOTE — ED NOTES
Pt resting on stretcher with eyes closed, resp even, and unlabored  There are no signs of distress or discomfort  This nurse to continue monitoring        Sim Antonio RN  09/03/20 0623

## 2020-09-03 NOTE — PLAN OF CARE
Problem: Alteration in Thoughts and Perception  Goal: Treatment Goal: Gain control of psychotic behaviors/thinking, reduce/eliminate presenting symptoms and demonstrate improved reality functioning upon discharge  Outcome: Progressing     Problem: DISCHARGE PLANNING  Goal: Discharge to home or other facility with appropriate resources  Description: INTERVENTIONS:  - Identify barriers to discharge w/patient and caregiver  - Arrange for needed discharge resources and transportation as appropriate  - Identify discharge learning needs (meds, wound care, etc )  - Arrange for interpretive services to assist at discharge as needed  - Refer to Case Management Department for coordinating discharge planning if the patient needs post-hospital services based on physician/advanced practitioner order or complex needs related to functional status, cognitive ability, or social support system  Outcome: Progressing     Problem: Depression  Goal: Treatment Goal: Demonstrate behavioral control of depressive symptoms, verbalize feelings of improved mood/affect, and adopt new coping skills prior to discharge  Outcome: Not Progressing     Problem: Anxiety  Goal: Anxiety is at manageable level  Description: Interventions:  - Assess and monitor patient's anxiety level  - Monitor for signs and symptoms (heart palpitations, chest pain, shortness of breath, headaches, nausea, feeling jumpy, restlessness, irritable, apprehensive)  - Collaborate with interdisciplinary team and initiate plan and interventions as ordered    - [place] patient to unit/surroundings  - Explain treatment plan  - Encourage participation in care  - Encourage verbalization of concerns/fears  - Identify coping mechanisms  - Assist in developing anxiety-reducing skills  - Administer/offer alternative therapies  - Limit or eliminate stimulants  Outcome: Not Progressing     Problem: Ineffective Coping  Goal: Participates in unit activities  Description: Interventions:  - Provide therapeutic environment   - Provide required programming   - Redirect inappropriate behaviors   Outcome: Not Progressing

## 2020-09-03 NOTE — ASSESSMENT & PLAN NOTE
· TSH slightly elevated (09/02/20) @ 9 930  · Check free T4  · Continue Synthroid 50 mcg daily for now

## 2020-09-03 NOTE — PROGRESS NOTES
09/03/20 1436   Team Meeting   Meeting Type Tx Team Meeting   Initial Conference Date 09/03/20   Next Conference Date 10/03/20   Team Members Present   Team Members Present Physician;;Nurse   Physician Team Member Barbie   Nursing Team Member Abdoul   Social Work Team Member Dyana   Patient/Family Present   Patient Present Yes   Patient's Family Present No   Pt agreed and signed   Pt was fixed on making sure her Klonopin would not be DC

## 2020-09-03 NOTE — ASSESSMENT & PLAN NOTE
Lab Results   Component Value Date    HGBA1C 7 0 (H) 05/14/2020     Recent Labs     09/03/20  1050   POCGLU 161*     Blood Sugar Average: Last 72 hrs:  (P) 161     · Home regimen includes Glipizide 5 mg daily, which can be continued here  · Add SSI and accu-checks  · ADA diet

## 2020-09-03 NOTE — PROGRESS NOTES
Patient has been at the nurses station numerous times asking if her Klonopin has been ordered  RN made patient aware that when it is ordered she will let patient know

## 2020-09-03 NOTE — ASSESSMENT & PLAN NOTE
 Patient is medically cleared for admission to [facility] and treatment of underlying psychiatric illness   No acute medical needs  Medicine will sign off  Please call with additional questions or concerns

## 2020-09-03 NOTE — ED NOTES
This nurse got pt water but pt is refusing food at this time  Pt is pacing in room  Chelsi Zapien crisis worker at bedside with pt  This nurse made pt's bed and gave pt a new blanket  Pt denies distress or discomfort at this time  This nurse to continue monitoring        Cory Umana RN  09/02/20 2041

## 2020-09-03 NOTE — TREATMENT PLAN
TREATMENT PLAN REVIEW - [address] 48 y o  1965 female MRN: [id number]    [address] [address] Room / Bed: [facility] 341/[facility] 341-01 Encounter: [id number]          Admit Date/Time:  9/2/2020  2:57 PM    Treatment Team: Attending Provider: Magaly Cope MD; Consulting Physician: Signe Simmonds, MD; Care Manager: Cece Delacruz; Registered Nurse: Severa Nyhan, RN; Patient Care Assistant: Victoria Castillo; Patient Care Technician: Georgie Hashimoto; Patient Care Assistant: Chris Beverly;  Advanced Practitioner: Mini Cedeño PA-C; : YAMILETH Salazar    Diagnosis: Principal Problem:    Bipolar I disorder with anxious distress ([facility] [address] )  Active Problems:    Benzodiazepine dependence, continuous ([address] )    Generalized anxiety disorder    Cognitive dysfunction in bipolar disorder ([address] )    Alcohol use disorder, moderate, dependence ([facility] [address] )    Non compliance w medication regimen    Post-traumatic stress disorder, chronic    Acquired hypothyroidism    Hypokalemia    Hyperlipemia    Transaminitis    Essential hypertension    Type 2 diabetes mellitus without complication, without long-term current use of insulin (HCC)    Medical clearance for psychiatric admission    Tobacco abuse      Patient Strengths/Assets: negotiates basic needs, patient is on a voluntary commitment    Patient Barriers/Limitations: limited support system, noncompliant with medication, poor insight, poor self-care, substance abuse in the recent past    Short Term Goals: decrease in depressive symptoms, decrease in anxiety symptoms    Long Term Goals: improvement in depression, improvement in anxiety, stabilization of mood, free of suicidal thoughts, improved insight, able to express basic needs, acceptance of need for psychiatric medications, adequate self care    Progress Towards Goals: starting psychiatric medications as prescribed    Recommended Treatment: medication management, patient medication education, group therapy, milieu therapy, continued Behavioral Health psychiatric evaluation/assessment process    Treatment Frequency: daily medication monitoring, group and milieu therapy daily, monitoring through interdisciplinary rounds, monitoring through weekly patient care conferences, monitoring through periodic consultation with family, individual psychotherapy on the unit daily, monitoring medication levels as indicated    Expected Discharge Date:  7 days    Discharge Plan: return to previous living arrangement    Treatment Plan Created/Updated By: Amy Dean MD

## 2020-09-03 NOTE — ED NOTES
Pt urinated herself  Pt is being changed by Anahi Dumont ER tech  Medication administered to pt as ordered  Pt denies pain, distress, or discomfort  Report was called to [facility] on the receiving floor and pt is to be transported via w/c for inhosptial treatment  This nurse to continue monitoring until pt has left unit        Seferino Spurling, RN  09/03/20 0373
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
## 2020-09-03 NOTE — PLAN OF CARE
Problem: Alteration in Thoughts and Perception  Goal: Treatment Goal: Gain control of psychotic behaviors/thinking, reduce/eliminate presenting symptoms and demonstrate improved reality functioning upon discharge  Outcome: Not Progressing     Problem: Depression  Goal: Treatment Goal: Demonstrate behavioral control of depressive symptoms, verbalize feelings of improved mood/affect, and adopt new coping skills prior to discharge  Outcome: Not Progressing     Problem: Anxiety  Goal: Anxiety is at manageable level  Description: Interventions:  - Assess and monitor patient's anxiety level  - Monitor for signs and symptoms (heart palpitations, chest pain, shortness of breath, headaches, nausea, feeling jumpy, restlessness, irritable, apprehensive)  - Collaborate with interdisciplinary team and initiate plan and interventions as ordered    - [place] patient to unit/surroundings  - Explain treatment plan  - Encourage participation in care  - Encourage verbalization of concerns/fears  - Identify coping mechanisms  - Assist in developing anxiety-reducing skills  - Administer/offer alternative therapies  - Limit or eliminate stimulants  Outcome: Not Progressing

## 2020-09-03 NOTE — PROGRESS NOTES
Aurora Aldrich is a 49 y/o female being admitted on a 201 to [facility] from [facility] due to increased anxiety  While in the ED patient at times was unable to talk due to anxiety, all that she could say was "I can't speak"  Patient denied SI/HI, but stated that she has a history of suicide attempts  Patient has not been taking her medications and states that she does not feel safe at home  She denies AH and VH, but has not been able to keep up with her ADL's and caring for herself  Patient continues to live with her son and is able to return there upon discharge  Patient has services for psychiatrist and therapist through [address] [place] [address]  Patient denied ETOH and drug use, but was positive for THC  She also tested positive for benzos, but is prescribed Klonopin

## 2020-09-03 NOTE — ED NOTES
Pt now resting on stretcher with no signs of distress or discomfort  Resp even and unlabored  Pt's eyes are closed  This nurse to continue monitoring       Seferino Spurling, RN  09/02/20 8826

## 2020-09-03 NOTE — PLAN OF CARE
No group attendance noted within past 24 hours       Problem: Ineffective Coping  Goal: Participates in unit activities  Description: Interventions:  - Provide therapeutic environment   - Provide required programming   - Redirect inappropriate behaviors   Outcome: Not Progressing

## 2020-09-04 LAB
GLUCOSE SERPL-MCNC: 146 MG/DL (ref 65–140)
GLUCOSE SERPL-MCNC: 148 MG/DL (ref 65–140)
GLUCOSE SERPL-MCNC: 161 MG/DL (ref 65–140)
GLUCOSE SERPL-MCNC: 167 MG/DL (ref 65–140)
T4 FREE SERPL-MCNC: 1.11 NG/DL (ref 0.76–1.46)
TSH SERPL DL<=0.05 MIU/L-ACNC: 5.73 UIU/ML (ref 0.47–4.68)

## 2020-09-04 PROCEDURE — 84443 ASSAY THYROID STIM HORMONE: CPT | Performed by: PSYCHIATRY & NEUROLOGY

## 2020-09-04 PROCEDURE — 84439 ASSAY OF FREE THYROXINE: CPT | Performed by: PSYCHIATRY & NEUROLOGY

## 2020-09-04 PROCEDURE — 99232 SBSQ HOSP IP/OBS MODERATE 35: CPT | Performed by: PSYCHIATRY & NEUROLOGY

## 2020-09-04 PROCEDURE — 82948 REAGENT STRIP/BLOOD GLUCOSE: CPT

## 2020-09-04 RX ORDER — HYDROXYZINE 50 MG/1
50 TABLET, FILM COATED ORAL EVERY 6 HOURS PRN
Status: DISCONTINUED | OUTPATIENT
Start: 2020-09-04 | End: 2020-09-05

## 2020-09-04 RX ORDER — GABAPENTIN 400 MG/1
400 CAPSULE ORAL 3 TIMES DAILY
Status: DISCONTINUED | OUTPATIENT
Start: 2020-09-04 | End: 2020-09-05

## 2020-09-04 RX ORDER — CLONIDINE HYDROCHLORIDE 0.1 MG/1
0.05 TABLET ORAL
Status: DISCONTINUED | OUTPATIENT
Start: 2020-09-04 | End: 2020-09-07

## 2020-09-04 RX ORDER — CLONAZEPAM 1 MG/1
1 TABLET ORAL 2 TIMES DAILY
Status: DISCONTINUED | OUTPATIENT
Start: 2020-09-04 | End: 2020-09-07 | Stop reason: HOSPADM

## 2020-09-04 RX ORDER — GABAPENTIN 300 MG/1
600 CAPSULE ORAL
Status: DISCONTINUED | OUTPATIENT
Start: 2020-09-04 | End: 2020-09-07

## 2020-09-04 RX ADMIN — HYDROXYZINE HYDROCHLORIDE 75 MG: 25 TABLET, FILM COATED ORAL at 15:05

## 2020-09-04 RX ADMIN — Medication 1 TABLET: at 10:28

## 2020-09-04 RX ADMIN — BUSPIRONE HYDROCHLORIDE 20 MG: 10 TABLET ORAL at 21:23

## 2020-09-04 RX ADMIN — METOPROLOL TARTRATE 12.5 MG: 25 TABLET ORAL at 10:28

## 2020-09-04 RX ADMIN — POTASSIUM CHLORIDE 20 MEQ: 20 TABLET, EXTENDED RELEASE ORAL at 10:29

## 2020-09-04 RX ADMIN — GABAPENTIN 600 MG: 300 CAPSULE ORAL at 21:22

## 2020-09-04 RX ADMIN — CLONIDINE HYDROCHLORIDE 0.05 MG: 0.1 TABLET ORAL at 21:25

## 2020-09-04 RX ADMIN — LITHIUM CARBONATE 450 MG: 300 CAPSULE, GELATIN COATED ORAL at 21:22

## 2020-09-04 RX ADMIN — BUSPIRONE HYDROCHLORIDE 20 MG: 10 TABLET ORAL at 10:28

## 2020-09-04 RX ADMIN — POTASSIUM CHLORIDE 20 MEQ: 20 TABLET, EXTENDED RELEASE ORAL at 18:24

## 2020-09-04 RX ADMIN — CLONAZEPAM 1 MG: 1 TABLET ORAL at 21:00

## 2020-09-04 RX ADMIN — INSULIN LISPRO 1 UNITS: 100 INJECTION, SOLUTION INTRAVENOUS; SUBCUTANEOUS at 12:35

## 2020-09-04 RX ADMIN — OLANZAPINE 2.5 MG: 2.5 TABLET, FILM COATED ORAL at 04:09

## 2020-09-04 RX ADMIN — INSULIN LISPRO 1 UNITS: 100 INJECTION, SOLUTION INTRAVENOUS; SUBCUTANEOUS at 16:59

## 2020-09-04 RX ADMIN — GABAPENTIN 400 MG: 400 CAPSULE ORAL at 10:28

## 2020-09-04 RX ADMIN — LITHIUM CARBONATE 300 MG: 300 CAPSULE, GELATIN COATED ORAL at 10:28

## 2020-09-04 RX ADMIN — GLIPIZIDE 5 MG: 5 TABLET ORAL at 10:30

## 2020-09-04 RX ADMIN — GABAPENTIN 400 MG: 400 CAPSULE ORAL at 13:47

## 2020-09-04 RX ADMIN — Medication 100 MG: at 10:28

## 2020-09-04 RX ADMIN — NICOTINE 1 PATCH: 7 PATCH TRANSDERMAL at 10:27

## 2020-09-04 RX ADMIN — CLONAZEPAM 1 MG: 1 TABLET ORAL at 10:28

## 2020-09-04 RX ADMIN — METOPROLOL TARTRATE 12.5 MG: 25 TABLET ORAL at 21:21

## 2020-09-04 RX ADMIN — FOLIC ACID 1000 MCG: 1 TABLET ORAL at 10:28

## 2020-09-04 RX ADMIN — PANTOPRAZOLE SODIUM 20 MG: 20 TABLET, DELAYED RELEASE ORAL at 06:06

## 2020-09-04 NOTE — PLAN OF CARE
Problem: Alteration in Thoughts and Perception  Goal: Treatment Goal: Gain control of psychotic behaviors/thinking, reduce/eliminate presenting symptoms and demonstrate improved reality functioning upon discharge  Outcome: Progressing     Problem: Depression  Goal: Treatment Goal: Demonstrate behavioral control of depressive symptoms, verbalize feelings of improved mood/affect, and adopt new coping skills prior to discharge  Outcome: Progressing     Problem: Anxiety  Goal: Anxiety is at manageable level  Description: Interventions:  - Assess and monitor patient's anxiety level  - Monitor for signs and symptoms (heart palpitations, chest pain, shortness of breath, headaches, nausea, feeling jumpy, restlessness, irritable, apprehensive)  - Collaborate with interdisciplinary team and initiate plan and interventions as ordered    - [place] patient to unit/surroundings  - Explain treatment plan  - Encourage participation in care  - Encourage verbalization of concerns/fears  - Identify coping mechanisms  - Assist in developing anxiety-reducing skills  - Administer/offer alternative therapies  - Limit or eliminate stimulants  Outcome: Progressing     Problem: Ineffective Coping  Goal: Participates in unit activities  Description: Interventions:  - Provide therapeutic environment   - Provide required programming   - Redirect inappropriate behaviors   Outcome: Progressing     Problem: DISCHARGE PLANNING  Goal: Discharge to home or other facility with appropriate resources  Description: INTERVENTIONS:  - Identify barriers to discharge w/patient and caregiver  - Arrange for needed discharge resources and transportation as appropriate  - Identify discharge learning needs (meds, wound care, etc )  - Arrange for interpretive services to assist at discharge as needed  - Refer to Case Management Department for coordinating discharge planning if the patient needs post-hospital services based on physician/advanced practitioner order or complex needs related to functional status, cognitive ability, or social support system  Outcome: Progressing

## 2020-09-04 NOTE — PROGRESS NOTES
Pt c/o increased anxiety, at the desk several times asking for PRN atarax  She appears distracted, depressed  Has tachycardia and poor sleep  Pt received PRN atarax 75 mg for complaints

## 2020-09-04 NOTE — PROGRESS NOTES
09/04/20 1135   Team Meeting   Meeting Type Daily Rounds   Team Members Present   Team Members Present Physician;Nurse;;; Occupational Therapist   Physician Team Member Dr Lazara Cagle Management Team Member [address] [facility] [place] Work Team Member [place]   OT Team Member    Patient/Family Present   Patient Present No   Patient's Family Present No   Pt focused on Klonopin but doctor is decreasing to 05mg 2x daily  PT was started on Klonopin in [place]  PT PRN need to be checked and updated in Epic   Pt is tangential, slow  to respond, and appears under the influence although there is no evidence of such

## 2020-09-04 NOTE — PLAN OF CARE
Met with PT who was able to sign releases, however becomes confused while signing and repeats letter  Pt is disorganized, medication focused  PT asked SW to call her son and make sure he knows she is supposed to drink her medications 3x a day, not 2x like he thinks  However, Pt was able to carry discussion better than yesterday and stayed on topic  Problem: Anxiety  Goal: Anxiety is at manageable level  Description: Interventions:  - Assess and monitor patient's anxiety level  - Monitor for signs and symptoms (heart palpitations, chest pain, shortness of breath, headaches, nausea, feeling jumpy, restlessness, irritable, apprehensive)  - Collaborate with interdisciplinary team and initiate plan and interventions as ordered    - [place] patient to unit/surroundings  - Explain treatment plan  - Encourage participation in care  - Encourage verbalization of concerns/fears  - Identify coping mechanisms  - Assist in developing anxiety-reducing skills  - Administer/offer alternative therapies  - Limit or eliminate stimulants  Outcome: Progressing     Problem: Ineffective Coping  Goal: Participates in unit activities  Description: Interventions:  - Provide therapeutic environment   - Provide required programming   - Redirect inappropriate behaviors   Outcome: Progressing     Problem: DISCHARGE PLANNING  Goal: Discharge to home or other facility with appropriate resources  Description: INTERVENTIONS:  - Identify barriers to discharge w/patient and caregiver  - Arrange for needed discharge resources and transportation as appropriate  - Identify discharge learning needs (meds, wound care, etc )  - Arrange for interpretive services to assist at discharge as needed  - Refer to Case Management Department for coordinating discharge planning if the patient needs post-hospital services based on physician/advanced practitioner order or complex needs related to functional status, cognitive ability, or social support system  Outcome: Progressing

## 2020-09-04 NOTE — PROGRESS NOTES
Progress Note - Behavioral Health   Marie Braxton 48 y o  female MRN: [id number]  Unit/Bed#: [facility] 341-01 Encounter: [id number]    Assessment/Plan     Ms Josemanuel Jhaveri is a 48year old female who was admitted to [facility] for suicidal ideation on a 201 with a past psychiatric history of Bipolar Disorder, Generalized anxiety Disorder, Alcohol Use Disorder, PTSD and past medical history of Type 2 Diabetes and Hypertension       1  Bipolar I Disorder vs  Substance induced mood disorder vs MDD  The patient does have a history of doc, automatically giving her the diagnosis of Bipolar I disorder  Currently she seems to have features of anxiety and depression  It is possible that this could be substance induced considering her history of alcohol use and benzodiazepine use  Her most recent use of benzodiazepines was 2 days ago, so in order to rule out substances as the cause we would need to continue observing her  It is also possible that this patient is having MDD, however many of her depressive symptoms can be explained by her anxiety as well, especially considering that the patients primary complaint is her anxiety  Therefore it is most likely that this patient has Bipolar I Disorder with anxious features  Plan  - Continue Lithium 450mg QAM and 300mg QPM              - Patients last Lithium level was decreased to 0 2 on 09/02/20  Would  recomment   that we allow for 5 days of treatment with Lithium and then repeat  the lithium   level   - Patient endorses increased anxiety in the evening, so change Gabapentin to 400mg in the morning, 400mg in the afternoon, 400mg in the evening and 600mg at night   - Continue Buspirone 20mg TID     - Add Clonidine 0 1mg QAM to aid with anxiety and symptoms of benzodiazepine withdrawal   - Add Atarax 50mg PRN, if patient does well on this consider making this Atarax 50mg TID standing      2  Benzodiazepine Use Disorder  Patient states that she is prescribed Klonopin 1 mg TID   She was tachycardic today with a pulse of 102 at 1:45PM  Patient spends the majority of the interview and the majority of her day thinking about the drug and attempting to obtain the drug  She displays signs of withdrawal such as anxiety, panic attacks, tachycardia, and restlessness  Plan  Continue Klonopin to 1mg BID and continue to downtitrate throughout her stay  Add Clonidine 0 1mg QAM for withdrawal symptoms  Monitor for withdrawal  Place on CIWA Protocol     3  Alcohol Use Disorder  Patient states that she has not drank alcohol in 6 months  Plan  Monitor for withdrawal  Place on Folate 1000mg QD  Thiamine 100mg QD     4  Diabetes Mellitus II  HbA1c on 5/14/20 was elevated to 7 0  Plan  Continue Glipizide 5mg QAM  Insulin Sliding Scale     5  Hypertension  Latest Blood Pressure reading was 109/72 at 3:13PM today  Plan   Continue Metoprolol 12 5mg BID  Continue Amlodipine 5mg QD    ------------------------------------------------------------    Interval History: Per staff, patient is very focused on getting her Klonopin  She received Zyprexa 2 5mg at 4AM for agitation  Patient states that she is suffering from anxiety and is very fixated on receiving her Klonopin mentioning it multiple times throughout the interview and repeating what time of day she receives it  She expressed that the team reducing the frequency of her Klonopin makes her feel as though she is being punished  She spent the majority of today attempting to obtain Klonopin by going to the nurses station multiple times or asking the team for it  She was exhibiting anxiety and restlessness throughout the interview, she was also tachycardic this morning  I explained to her that Klonopin is habit forming which is why we reduced how often she receives it  She stated that she understood but then began speaking about Klonopin again  She states that her appetite is decreased and she did not eat lunch today   She also states that she has been having difficulty sleeping , She endorses passive death wishes  She denies HI and AVH  Psychiatric Review of Systems:  Behavior over the last 24 hours: improved  Sleep: decreased  Appetite: good, decreased  Medication side effects: none  ROS: Complete review of systems is negative except as noted above  Vital signs in last 24 hours:  Temp:  [97 °F (36 1 °C)-97 5 °F (36 4 °C)] 97 °F (36 1 °C)  HR:  [] 101  Resp:  [16] 16  BP: (109-148)/(72-81) 148/80    Mental Status Evaluation:  Appearance:  Overweight  woman dressed in 2 hospital gowns  She makes inappropriate prolonged eye contact and looks disheveled  Yesterday her breast was exposed in the beginning of the interview, today she is properly clothed  Behavior:  restless and fidgety, psychomotor agitation and slow gait, was able to be redirected during the interview and sat down     Attitude:  anxious   Speech:  delayed initiation, slurred, decreased rate, loud, scant and poverty of content   Mood:  anxious   Affect:  mood-congruent   Thought Process:  perseverative, decreased rate of thoughts   Thought Content: obtaining Klonopin   Perceptual disturbances: no reported auditory hallucinations, no reported visual hallucinations and does not appear to be responding to internal stimuli at this time   Risk Potential: Passive death wishes, Low potential for aggression based on previous behavior   Cognition: oriented to person, place, time, and situation, appears to be of average intelligence, attention span appeared shorter than expected for age and cognition not formally tested   Insight:  Poor   Judgment: Poor     Current Medications:  Current Facility-Administered Medications   Medication Dose Route Frequency Provider Last Rate    acetaminophen  650 mg Oral Q6H PRN Diann Martinez DO      busPIRone  20 mg Oral TID Sacha Matthews MD      clonazePAM  1 mg Oral BID Sacha Matthews MD      folic acid  3,521 mcg Oral Daily Jed Hunter MD  gabapentin  400 mg Oral 4x Daily Prasanna Bernal MD      glipiZIDE  5 mg Oral Daily Before Breakfast Prasanna Bernal MD      hydrOXYzine HCL  50 mg Oral Q6H PRN Prasanna Bernal MD      hydrOXYzine HCL  75 mg Oral Q8H PRN Prasanna Bernal MD      insulin lispro  1-5 Units Subcutaneous TID [facility] Shelbie Mosquera PA-C      insulin lispro  1-5 Units Subcutaneous HS Shelbie Mosquera PA-C      lithium carbonate  300 mg Oral QAM Prasanna Bernal MD      lithium carbonate  450 mg Oral HS Prasanna Bernal MD      metoprolol tartrate  12 5 mg Oral Q12H [address] Prasanna Bernal MD      multivitamin-minerals  1 tablet Oral Daily Aggie Smith MD      nicotine  1 patch Transdermal Daily Marium Borreggine, DO      OLANZapine  10 mg Intramuscular Once PRN Destini Matters Borreggine, DO      OLANZapine  5 mg Intramuscular Once PRN Destini Matters Borreggine, DO      pantoprazole  20 mg Oral Early Morning Prasanna Bernal MD      potassium chloride  20 mEq Oral BID Aggie Smith MD      thiamine  100 mg Oral Daily Aggie Smith MD      traZODone  50 mg Oral Once PRN Yordan Leaver, DO         Behavioral Health Medications: all current active meds have been reviewed  Changes as in plan section above  Laboratory results:  I have personally reviewed all pertinent laboratory/tests results    Recent Results (from the past 48 hour(s))   POCT alcohol breath test    Collection Time: 09/02/20  3:35 PM   Result Value Ref Range    EXTBreath Alcohol 0 000    CBC and differential    Collection Time: 09/02/20  3:38 PM   Result Value Ref Range    WBC 16 50 (H) 4 50 - 11 00 Thousand/uL    RBC 4 22 4 00 - 5 20 Million/uL    Hemoglobin 13 9 12 0 - 16 0 g/dL    Hematocrit 40 1 36 0 - 46 0 %    MCV 95 80 - 100 fL    MCH 33 0 26 0 - 34 0 pg    MCHC 34 8 31 0 - 36 0 g/dL    RDW 14 9 <15 3 %    MPV 7 5 (L) 8 9 - 12 7 fL    Platelets 457 589 - 227 Thousands/uL   Comprehensive metabolic panel    Collection Time: 09/02/20  3:38 PM   Result Value Ref Range    Sodium 137 137 - 147 mmol/L    Potassium 3 3 (L) 3 6 - 5 0 mmol/L    Chloride 105 97 - 108 mmol/L    CO2 24 22 - 30 mmol/L    ANION GAP 8 5 - 14 mmol/L    BUN 7 5 - 25 mg/dL    Creatinine 0 67 0 60 - 1 20 mg/dL    Glucose 130 (H) 70 - 99 mg/dL    Calcium 9 7 8 4 - 10 2 mg/dL    AST 37 (H) 14 - 36 U/L    ALT 57 (H) 9 - 52 U/L    Alkaline Phosphatase 112 43 - 122 U/L    Total Protein 7 7 5 9 - 8 4 g/dL    Albumin 4 3 3 0 - 5 2 g/dL    Total Bilirubin 1 90 (H) <1 30 mg/dL    eGFR 101 >60 ml/min/1 73sq m   TSH    Collection Time: 09/02/20  3:38 PM   Result Value Ref Range    TSH 3RD GENERATON 9 930 (H) 0 465 - 4 680 uIU/mL   Lithium level    Collection Time: 09/02/20  3:38 PM   Result Value Ref Range    Lithium Lvl 0 3 (L) 0 6 - 1 2 mmol/L   Acetaminophen level-"If concentration is detectable, please discuss with medical  on call "    Collection Time: 09/02/20  3:38 PM   Result Value Ref Range    Acetaminophen Level <10 (L) 10 - 20 ug/mL   Salicylate level    Collection Time: 09/02/20  3:38 PM   Result Value Ref Range    Salicylate Lvl <7 4 (L) 10 - 30 mg/dL   Manual Differential (Non Wam)    Collection Time: 09/02/20  3:38 PM   Result Value Ref Range    Segmented % 77 (H) 45 - 65 %    Bands % 1 0 - 8 %    Lymphocytes % 18 (L) 25 - 45 %    Monocytes % 3 1 - 10 %    Basophils % 1 0 - 1 %    Neutrophils Absolute 12 87 (H) 1 80 - 7 80 Thousand/uL    Lymphocytes Absolute 2 97 0 50 - 4 00 Thousand/uL    Monocytes Absolute 0 50 0 20 - 0 90 Thousand/uL    Basophils Absolute 0 17 (H) 0 00 - 0 10 Thousand/uL    Total Counted 100     RBC Morphology Normal     Platelet Estimate Increased (A) Adequate   T3, free    Collection Time: 09/02/20  3:38 PM   Result Value Ref Range    T3, Free 2 83 2 30 - 4 20 pg/mL   Hemoglobin A1c w/EAG Estimation (Orders if not completed within the last 90 days)    Collection Time: 09/02/20  3:38 PM   Result Value Ref Range    Hemoglobin A1C 6 8 (H) Normal 3 8-5 6%; PreDiabetic 5 7-6 4%;  Diabetic >=6 5%; Glycemic control for adults with diabetes <7 0% %     mg/dl   Novel Coronavirus (Covid-19),PCR [facility]    Collection Time: 09/02/20  3:44 PM    Specimen: Nose; Nares   Result Value Ref Range    SARS-CoV-2 Negative Negative   Rapid drug screen, urine    Collection Time: 09/02/20  3:59 PM   Result Value Ref Range    Amph/Meth UR Negative Negative    Barbiturate Ur Negative Negative    Benzodiazepine Urine Positive (A) Negative    Cocaine Urine Negative Negative    Methadone Urine Negative Negative    Opiate Urine Negative Negative    PCP Ur Negative Negative    THC Urine Positive (A) Negative    Oxycodone Urine Negative Negative   UA w Reflex to Microscopic w Reflex to Culture    Collection Time: 09/02/20  3:59 PM    Specimen: Urine, Clean Catch   Result Value Ref Range    Color, UA [place] (A) Straw, Yellow, Pale Yellow    Clarity, UA Slightly Cloudy (A) Clear, Other    Specific Gravity, UA 1 015 1 003 - 1 040    pH, UA 6 0 4 5, 5 0, 5 5, 6 0, 6 5, 7 0, 7 5, 8 0    Leukocytes, UA Negative Negative    Nitrite, UA Negative Negative    Protein, UA Negative Negative mg/dl    Glucose, UA Negative Negative mg/dl    Ketones, UA Negative Negative mg/dl    Bilirubin, UA Negative Negative    Blood, UA Negative Negative    UROBILINOGEN UA 1 0 1 0, Negative mg/dL   ECG 12 lead    Collection Time: 09/02/20  4:03 PM   Result Value Ref Range    Ventricular Rate 83 BPM    Atrial Rate 83 BPM    CO Interval 184 ms    QRSD Interval 78 ms    QT Interval 410 ms    QTC Interval 481 ms    P [place] 51 degrees    QRS Axis 9 degrees    T Wave Axis 18 degrees   Fingerstick Glucose (POCT)    Collection Time: 09/03/20 10:50 AM   Result Value Ref Range    POC Glucose 161 (H) 65 - 140 mg/dl   Fingerstick Glucose (POCT)    Collection Time: 09/03/20  4:37 PM   Result Value Ref Range    POC Glucose 170 (H) 65 - 140 mg/dl   Fingerstick Glucose (POCT)    Collection Time: 09/03/20  8:54 PM   Result Value Ref Range POC Glucose 159 (H) 65 - 140 mg/dl   TSH, 3rd generation with Free T4 reflex    Collection Time: 09/04/20  6:19 AM   Result Value Ref Range    TSH 3RD GENERATON 5 730 (H) 0 465 - 4 680 uIU/mL   Fingerstick Glucose (POCT)    Collection Time: 09/04/20  7:58 AM   Result Value Ref Range    POC Glucose 146 (H) 65 - 140 mg/dl   Fingerstick Glucose (POCT)    Collection Time: 09/04/20 11:11 AM   Result Value Ref Range    POC Glucose 161 (H) 65 - 140 mg/dl        Dong Mendoza  MS4

## 2020-09-04 NOTE — PROGRESS NOTES
Patient's response to previous prn Atarax documented on anxiety scale  She was in bed asleep when reassessed

## 2020-09-04 NOTE — PROGRESS NOTES
Patient sleeping soundly until around 10:25 even after numerous attempts for breakfast and vital signs  Woke and immediately and came to the nurses station for her medications  Patient states that she would like to speak with the dr so she can have something extra for sleep  Preoccupied with medications  No current complaints of anxiety  Will monitor

## 2020-09-04 NOTE — PROGRESS NOTES
Patient was expecting and asking for Klonopin at [facility] also  She wanted to know if she would be receiving Darvocet also and when she heard she was not she said "I'll never sleep "  Covered with 1 unit at dinner and HS  Patient has been in the milieu for needs but otherwise has been in her room, napping on and off

## 2020-09-05 LAB
GLUCOSE SERPL-MCNC: 110 MG/DL (ref 65–140)
GLUCOSE SERPL-MCNC: 142 MG/DL (ref 65–140)
GLUCOSE SERPL-MCNC: 152 MG/DL (ref 65–140)
GLUCOSE SERPL-MCNC: 96 MG/DL (ref 65–140)

## 2020-09-05 PROCEDURE — 99232 SBSQ HOSP IP/OBS MODERATE 35: CPT | Performed by: PSYCHIATRY & NEUROLOGY

## 2020-09-05 PROCEDURE — 82948 REAGENT STRIP/BLOOD GLUCOSE: CPT

## 2020-09-05 RX ORDER — HYDROXYZINE 50 MG/1
50 TABLET, FILM COATED ORAL EVERY 4 HOURS PRN
Status: DISCONTINUED | OUTPATIENT
Start: 2020-09-05 | End: 2020-09-07 | Stop reason: HOSPADM

## 2020-09-05 RX ADMIN — BUSPIRONE HYDROCHLORIDE 20 MG: 10 TABLET ORAL at 16:13

## 2020-09-05 RX ADMIN — GABAPENTIN 400 MG: 400 CAPSULE ORAL at 17:40

## 2020-09-05 RX ADMIN — Medication 1 TABLET: at 08:29

## 2020-09-05 RX ADMIN — GABAPENTIN 600 MG: 300 CAPSULE ORAL at 21:05

## 2020-09-05 RX ADMIN — POTASSIUM CHLORIDE 20 MEQ: 20 TABLET, EXTENDED RELEASE ORAL at 08:31

## 2020-09-05 RX ADMIN — GLIPIZIDE 5 MG: 5 TABLET ORAL at 08:15

## 2020-09-05 RX ADMIN — HYDROXYZINE HYDROCHLORIDE 50 MG: 50 TABLET, FILM COATED ORAL at 10:30

## 2020-09-05 RX ADMIN — CLONAZEPAM 1 MG: 1 TABLET ORAL at 08:14

## 2020-09-05 RX ADMIN — METOPROLOL TARTRATE 12.5 MG: 25 TABLET ORAL at 20:42

## 2020-09-05 RX ADMIN — GABAPENTIN 400 MG: 400 CAPSULE ORAL at 08:13

## 2020-09-05 RX ADMIN — LITHIUM CARBONATE 300 MG: 300 CAPSULE, GELATIN COATED ORAL at 08:15

## 2020-09-05 RX ADMIN — CLONAZEPAM 1 MG: 1 TABLET ORAL at 19:19

## 2020-09-05 RX ADMIN — NICOTINE 1 PATCH: 7 PATCH TRANSDERMAL at 08:29

## 2020-09-05 RX ADMIN — CLONIDINE HYDROCHLORIDE 0.05 MG: 0.1 TABLET ORAL at 21:05

## 2020-09-05 RX ADMIN — GABAPENTIN 400 MG: 400 CAPSULE ORAL at 12:33

## 2020-09-05 RX ADMIN — HYDROXYZINE HYDROCHLORIDE 50 MG: 50 TABLET, FILM COATED ORAL at 16:15

## 2020-09-05 RX ADMIN — BUSPIRONE HYDROCHLORIDE 20 MG: 10 TABLET ORAL at 08:14

## 2020-09-05 RX ADMIN — HYDROXYZINE HYDROCHLORIDE 50 MG: 50 TABLET, FILM COATED ORAL at 23:15

## 2020-09-05 RX ADMIN — LITHIUM CARBONATE 450 MG: 300 CAPSULE, GELATIN COATED ORAL at 21:05

## 2020-09-05 RX ADMIN — BUSPIRONE HYDROCHLORIDE 20 MG: 10 TABLET ORAL at 20:41

## 2020-09-05 RX ADMIN — PANTOPRAZOLE SODIUM 20 MG: 20 TABLET, DELAYED RELEASE ORAL at 06:42

## 2020-09-05 RX ADMIN — Medication 100 MG: at 08:31

## 2020-09-05 RX ADMIN — TRAZODONE HYDROCHLORIDE 50 MG: 50 TABLET ORAL at 21:31

## 2020-09-05 RX ADMIN — METOPROLOL TARTRATE 12.5 MG: 25 TABLET ORAL at 08:12

## 2020-09-05 RX ADMIN — FOLIC ACID 1000 MCG: 1 TABLET ORAL at 08:15

## 2020-09-05 RX ADMIN — ACETAMINOPHEN 650 MG: 325 TABLET ORAL at 18:34

## 2020-09-05 NOTE — PROGRESS NOTES
Patient was asleep at early evening medication pass and Neurontin and Buspar were not given  She has been able to receive all other medications this evening  She did not eat dinner but did eat some fresh fruits in med evening  Ate HS snack and did not require insulin coverage  She submitted a notice to withdraw from treatment at 5pm   The main focus when she talks with staff is medication and leaving hospital   She denies S  I

## 2020-09-05 NOTE — PLAN OF CARE
Attends select groups  Blunted/Flat  Able to express self when one to one  "My son hid my medications because he thought I was drinking " Tearful  "I had anxiety so I've been standing in my room"  Emerging ability to engage with peers      Problem: Ineffective Coping  Goal: Participates in unit activities  Description: Interventions:  - Provide therapeutic environment   - Provide required programming   - Redirect inappropriate behaviors   Outcome: Progressing

## 2020-09-05 NOTE — PROGRESS NOTES
pATIENT IS COMPLIANT WITH MEDICATION AND MEALS   sHE PACES UP AND DOWN THE [place] WAITING FOR HER NEXT MEDICATION sHE MAY ATTEND A GROUP OR TWO NOW AND THEN WILL CONTINUE TO OFFER SUPPORT AND CHART HER PROGRESS

## 2020-09-06 LAB
GLUCOSE SERPL-MCNC: 102 MG/DL (ref 65–140)
GLUCOSE SERPL-MCNC: 143 MG/DL (ref 65–140)
GLUCOSE SERPL-MCNC: 157 MG/DL (ref 65–140)
GLUCOSE SERPL-MCNC: 195 MG/DL (ref 65–140)

## 2020-09-06 PROCEDURE — 82948 REAGENT STRIP/BLOOD GLUCOSE: CPT

## 2020-09-06 PROCEDURE — 99232 SBSQ HOSP IP/OBS MODERATE 35: CPT | Performed by: PSYCHIATRY & NEUROLOGY

## 2020-09-06 RX ADMIN — CLONAZEPAM 1 MG: 1 TABLET ORAL at 09:21

## 2020-09-06 RX ADMIN — Medication 1 TABLET: at 09:20

## 2020-09-06 RX ADMIN — LITHIUM CARBONATE 450 MG: 300 CAPSULE, GELATIN COATED ORAL at 21:29

## 2020-09-06 RX ADMIN — BUSPIRONE HYDROCHLORIDE 20 MG: 10 TABLET ORAL at 16:30

## 2020-09-06 RX ADMIN — INSULIN LISPRO 1 UNITS: 100 INJECTION, SOLUTION INTRAVENOUS; SUBCUTANEOUS at 10:07

## 2020-09-06 RX ADMIN — CLONAZEPAM 1 MG: 1 TABLET ORAL at 20:00

## 2020-09-06 RX ADMIN — GLIPIZIDE 5 MG: 5 TABLET ORAL at 10:08

## 2020-09-06 RX ADMIN — BUSPIRONE HYDROCHLORIDE 20 MG: 10 TABLET ORAL at 21:29

## 2020-09-06 RX ADMIN — GABAPENTIN 600 MG: 300 CAPSULE ORAL at 21:29

## 2020-09-06 RX ADMIN — GABAPENTIN 500 MG: 400 CAPSULE ORAL at 12:48

## 2020-09-06 RX ADMIN — GABAPENTIN 500 MG: 400 CAPSULE ORAL at 09:20

## 2020-09-06 RX ADMIN — PANTOPRAZOLE SODIUM 20 MG: 20 TABLET, DELAYED RELEASE ORAL at 06:22

## 2020-09-06 RX ADMIN — HYDROXYZINE HYDROCHLORIDE 75 MG: 25 TABLET, FILM COATED ORAL at 13:47

## 2020-09-06 RX ADMIN — NICOTINE 1 PATCH: 7 PATCH TRANSDERMAL at 10:08

## 2020-09-06 RX ADMIN — METOPROLOL TARTRATE 12.5 MG: 25 TABLET ORAL at 10:02

## 2020-09-06 RX ADMIN — GABAPENTIN 500 MG: 400 CAPSULE ORAL at 17:07

## 2020-09-06 RX ADMIN — CLONIDINE HYDROCHLORIDE 0.05 MG: 0.1 TABLET ORAL at 21:29

## 2020-09-06 RX ADMIN — INSULIN LISPRO 1 UNITS: 100 INJECTION, SOLUTION INTRAVENOUS; SUBCUTANEOUS at 21:29

## 2020-09-06 RX ADMIN — BUSPIRONE HYDROCHLORIDE 20 MG: 10 TABLET ORAL at 09:20

## 2020-09-06 RX ADMIN — LITHIUM CARBONATE 300 MG: 300 CAPSULE, GELATIN COATED ORAL at 09:19

## 2020-09-06 RX ADMIN — METOPROLOL TARTRATE 12.5 MG: 25 TABLET ORAL at 21:30

## 2020-09-06 RX ADMIN — Medication 100 MG: at 10:07

## 2020-09-06 RX ADMIN — FOLIC ACID 1000 MCG: 1 TABLET ORAL at 09:20

## 2020-09-06 NOTE — PROGRESS NOTES
Pt is preoccupied with medication orders, but not med seeking  Compliant, guarded, but appropriate and reality based with communication  Visible most of shift, cites that her anxiety is mild and "always there"  Pt does not appear troubled by this  Denies SI, HI and A/V hallucinations

## 2020-09-06 NOTE — PROGRESS NOTES
Pt is visible and sociable on unit with peers  Pleasant and cooperative  Med and meal compliant  Pt reported upon discharge she will continue to live with her son and he will begin monitoring her medications by using a pill organizer  Pt reported son is concerned pt is addicted to Gabapentin  Pt denies SI HI AVH

## 2020-09-06 NOTE — PROGRESS NOTES
Bryson Trinh  was seen for continuing care and reviewed with staff  Progress Note - Behavioral Health   Bryson Trinh 48 y o  female MRN: @MRN   Unit/Bed#: [facility]-01 Encounter: [id number]       Report from staff regarding this patient received and discussed, and records reviewed prior to seeing this patient   Patient's condition continued to improve  Today despite the fact that she received less clonazepam, she was not anxious not be sleepy, not agitated and feeling better  The patient agreed with the writer that she would try to completely stop her clonazepam but afraid that it commode be done immediately  The patient agreed with lowering clonazepam to 2 mg from 3 mg daily and increase of Neurontin to prevent her from withdrawal symptoms  She is more visible in the unit  Sleep: improved  Appetite: normal    Medication side effects:no complaints    Mental Status Evaluation:    Appearance:  casually dressed   Behavior:  cooperative, calm   Mood:  improved, depressed, anxious   Affect: reactive, brighter    Speech:  slow   Thought Process:  concrete   Thought Content:  negative thinking   Perceptual Disturbances: denies auditory hallucinations when asked, does not appear responding to internal stimuli   Risk Potential: Suicidal ideation - None at present, contracts for safety on the unit  Homicidal ideation - None  Potential for aggression - No   Sensorium:  oriented to person, place and time   Memory:  recent and remote memory grossly intact   Consciousness:  alert and awake   Insight:  improving and impaired   Judgment: improving   Motor Activity: no abnormal movements   Gait/Station  normal gait/station, normal balance            Laboratory results:  I have personally reviewed all pertinent laboratory results  BMP: No results for input(s): NA, K, CL, CO2, BUN in the last 72 hours      Invalid input(s): CREA, GLU  Vitals:    09/05/20 1533   BP: 147/88   Pulse: 76   Resp: 16   Temp: (!) 96 8 °F (36 °C)   SpO2:         Medication Administration - last 24 hours from 09/04/2020 2005 to 09/05/2020 2005       Date/Time Order Dose Route Action Action by     09/05/2020 0829 nicotine (NICODERM CQ) 7 mg/24hr TD 24 hr patch 1 patch 1 patch Transdermal Medication Applied Chelsey Marroquin, ALEJANDRO     30/68/5322 4438 nicotine (NICODERM CQ) 7 mg/24hr TD 24 hr patch 1 patch 1 patch Transdermal Patch Removed Chelsey Marroquin LPN     90/41/1171 3105 acetaminophen (TYLENOL) tablet 650 mg 650 mg Oral Given Andrea Greenwood, RN     09/05/2020 0815 glipiZIDE (GLUCOTROL) tablet 5 mg 5 mg Oral Given Chelsey Marroquin, ALEJANDRO     18/43/5321 8667 lithium carbonate capsule 450 mg 450 mg Oral Given Nikolay Valentin, RN     09/05/2020 0815 lithium carbonate capsule 300 mg 300 mg Oral Given Chelsey Marroquin, ALEJANDRO     98/16/5927 2311 metoprolol tartrate (LOPRESSOR) partial tablet 12 5 mg 12 5 mg Oral Given Chelsey Marroquin, ALEJANDRO     98/90/6814 5612 metoprolol tartrate (LOPRESSOR) partial tablet 12 5 mg 12 5 mg Oral Given Nikolay Valentin, RN     09/05/2020 4002 pantoprazole (PROTONIX) EC tablet 20 mg 20 mg Oral Given Karl Andrew, RN     09/05/2020 1613 busPIRone (BUSPAR) tablet 20 mg 20 mg Oral Given [facility] Araseli, LPN     81/13/6826 9570 busPIRone (BUSPAR) tablet 20 mg 20 mg Oral Given Chelsey Marroquin, SHELTONN     88/58/5772 0542 busPIRone (BUSPAR) tablet 20 mg 20 mg Oral Given Nikolay Valentin, RN     09/05/2020 0831 thiamine (VITAMIN B1) tablet 100 mg 100 mg Oral Given Chelsey Marroquin, ALEJANDRO     96/85/3844 1893 folic acid (FOLVITE) tablet 1,000 mcg 1,000 mcg Oral Given [facility] Araseli, LPN     82/22/3272 3717 multivitamin-minerals (CENTRUM) tablet 1 tablet 1 tablet Oral Given Chelsey Marroquin, ALEJANDRO     12/70/6415 3622 potassium chloride (K-DUR,KLOR-CON) CR tablet 20 mEq 20 mEq Oral Given Chelsey Marroquin, SHELTONN     79/94/3988 1580 insulin lispro (HumaLOG) 100 units/mL subcutaneous injection 1-5 Units 1 Units Subcutaneous Not Given Alban Marx, SHELTONN     14/13/1491 2144 insulin lispro (HumaLOG) 100 units/mL subcutaneous injection 1-5 Units 1 Units Subcutaneous Not Given Zhang Maizes, LPN     41/65/5196 4883 insulin lispro (HumaLOG) 100 units/mL subcutaneous injection 1-5 Units 1 Units Subcutaneous Not Given Zhang Maizes, LPN     40/78/6571 7671 insulin lispro (HumaLOG) 100 units/mL subcutaneous injection 1-5 Units   Subcutaneous Not Given Randa Tovar RN     09/05/2020 1615 hydrOXYzine HCL (ATARAX) tablet 50 mg 50 mg Oral Given Chelsey Marroquin, LPN     92/22/6696 2182 hydrOXYzine HCL (ATARAX) tablet 50 mg 50 mg Oral Given Chelsey Marroquin, N     80/20/6607 5029 clonazePAM (KlonoPIN) tablet 1 mg 1 mg Oral Given Jailene Munoz, LPN     97/81/4804 1671 clonazePAM (KlonoPIN) tablet 1 mg 1 mg Oral Given Chelsey Marroquin, SHELTONN     53/07/9290 9176 clonazePAM (KlonoPIN) tablet 1 mg 1 mg Oral Given Randa Tovar RN     09/05/2020 1740 gabapentin (NEURONTIN) capsule 400 mg 400 mg Oral Given Chelsey Marroquin, LPN     04/36/3824 2270 gabapentin (NEURONTIN) capsule 400 mg 400 mg Oral Given Chelsey Downey, N     74/55/0512 3173 gabapentin (NEURONTIN) capsule 400 mg 400 mg Oral Given Chelsey Downey, LPN     32/56/6642 4525 gabapentin (NEURONTIN) capsule 600 mg 600 mg Oral Given Randa Tovar RN     09/04/2020 2125 cloNIDine (CATAPRES) tablet 0 05 mg 0 05 mg Oral Given Randa Tovar RN              Assessment/Plan   Principal Problem:    Bipolar I disorder with anxious distress ([facility] [address] )  Active Problems:    Essential hypertension    Alcohol use disorder, moderate, dependence ([facility] [address] )    Post-traumatic stress disorder, chronic    Generalized anxiety disorder    Type 2 diabetes mellitus without complication, without long-term current use of insulin (HCC)    Benzodiazepine dependence, continuous (HCC)    Non compliance w medication regimen    Acquired hypothyroidism    Hypokalemia    Hyperlipemia    Transaminitis    Cognitive dysfunction in bipolar disorder [facility])    Medical clearance for psychiatric admission    Tobacco abuse          Current Facility-Administered Medications:     acetaminophen (TYLENOL) tablet 650 mg, 650 mg, Oral, Q6H PRN, Marium Martinez, , 650 mg at 09/05/20 1834    busPIRone (BUSPAR) tablet 20 mg, 20 mg, Oral, TID, Gela Jarquin MD, 20 mg at 09/05/20 1613    clonazePAM (KlonoPIN) tablet 1 mg, 1 mg, Oral, BID, Gela Jarquin MD, 1 mg at 09/05/20 1919    cloNIDine (CATAPRES) tablet 0 05 mg, 0 05 mg, Oral, HS, Gela Jarquin MD, 0 05 mg at 49/65/86 5554    folic acid (FOLVITE) tablet 1,000 mcg, 1,000 mcg, Oral, Daily, Reji Bowden MD, 1,000 mcg at 09/05/20 0815    gabapentin (NEURONTIN) capsule 400 mg, 400 mg, Oral, TID, Gela Jarquin MD, 400 mg at 09/05/20 1740    gabapentin (NEURONTIN) capsule 600 mg, 600 mg, Oral, HS, Gela Jarquin MD, 600 mg at 09/04/20 2122    glipiZIDE (GLUCOTROL) tablet 5 mg, 5 mg, Oral, Daily Before Breakfast, Gela Jarquin MD, 5 mg at 09/05/20 0815    hydrOXYzine HCL (ATARAX) tablet 50 mg, 50 mg, Oral, Q4H PRN, Gela Jarquin MD    hydrOXYzine HCL (ATARAX) tablet 75 mg, 75 mg, Oral, Q8H PRN, Gela Jarquin MD, 75 mg at 09/04/20 1505    insulin lispro (HumaLOG) 100 units/mL subcutaneous injection 1-5 Units, 1-5 Units, Subcutaneous, TID AC, 1 Units at 09/04/20 1659 **AND** Fingerstick Glucose (POCT), , , TID AC, Natalio Leos PA-C    insulin lispro (HumaLOG) 100 units/mL subcutaneous injection 1-5 Units, 1-5 Units, Subcutaneous, HS, Natalio Leos PA-C, 1 Units at 09/03/20 2121    lithium carbonate capsule 300 mg, 300 mg, Oral, QAM, Gela Jarquin MD, 300 mg at 09/05/20 0815    lithium carbonate capsule 450 mg, 450 mg, Oral, HS, Gela Jarquin MD, 450 mg at 09/04/20 2122    metoprolol tartrate (LOPRESSOR) partial tablet 12 5 mg, 12 5 mg, Oral, Q12H [address], Gela Jarquin MD, 12 5 mg at 09/05/20 3002    multivitamin-minerals (CENTRUM) tablet 1 tablet, 1 tablet, Oral, Daily, Reji Bowden MD, 1 tablet at 09/05/20 0829    nicotine (NICODERM CQ) 7 mg/24hr TD 24 hr patch 1 patch, 1 patch, Transdermal, Daily, Marium Martinez DO, 1 patch at 09/05/20 0829    OLANZapine (ZyPREXA) IM injection 10 mg, 10 mg, Intramuscular, Once PRN, Sadaf Reyese, DO    OLANZapine (ZyPREXA) IM injection 5 mg, 5 mg, Intramuscular, Once PRN, Sadaf Martinez DO    pantoprazole (PROTONIX) EC tablet 20 mg, 20 mg, Oral, Early Morning, Baudilio Phillips MD, 20 mg at 09/05/20 5179    thiamine (VITAMIN B1) tablet 100 mg, 100 mg, Oral, Daily, Eveila Lugo MD, 100 mg at 09/05/20 0831    traZODone (DESYREL) tablet 50 mg, 50 mg, Oral, Once PRN, Sadaf Martinez DO    Recommended Treatment:    Will increase the daytime Neurontin to 600 mg, and provide Atarax on more frequent basis  The patient's sleep improved  No more medication adjustment for her sleep  Her depressive feelings improving as well  Planned medication and treatment changes: All current active medications have been reviewed  Continue treatment with group therapy, milieu therapy, occupational therapy and medication management  Risks / Benefits of Treatment:    Risks, benefits, and possible side effects of medications explained to patient and patient verbalizes understanding and agreement for treatment  Planned medication and treatment changes: All current active medications have been reviewed  Continue treatment with group therapy, milieu therapy, occupational therapy and medication management  Counseling / Coordination of Care:    Patient's progress reviewed with nursing staff  ** Please Note: This note has been constructed using a voice recognition system   **

## 2020-09-06 NOTE — PROGRESS NOTES
Pt reported anxiety 4/4  Unable to determine what was triggering anxiousness  75 mg Atarax was given  Will continue to monitor

## 2020-09-06 NOTE — PLAN OF CARE
Problem: Alteration in Thoughts and Perception  Goal: Treatment Goal: Gain control of psychotic behaviors/thinking, reduce/eliminate presenting symptoms and demonstrate improved reality functioning upon discharge  Outcome: Progressing     Problem: Depression  Goal: Treatment Goal: Demonstrate behavioral control of depressive symptoms, verbalize feelings of improved mood/affect, and adopt new coping skills prior to discharge  Outcome: Progressing     Problem: Anxiety  Goal: Anxiety is at manageable level  Description: Interventions:  - Assess and monitor patient's anxiety level  - Monitor for signs and symptoms (heart palpitations, chest pain, shortness of breath, headaches, nausea, feeling jumpy, restlessness, irritable, apprehensive)  - Collaborate with interdisciplinary team and initiate plan and interventions as ordered    - [place] patient to unit/surroundings  - Explain treatment plan  - Encourage participation in care  - Encourage verbalization of concerns/fears  - Identify coping mechanisms  - Assist in developing anxiety-reducing skills  - Administer/offer alternative therapies  - Limit or eliminate stimulants  Outcome: Progressing     Problem: DISCHARGE PLANNING  Goal: Discharge to home or other facility with appropriate resources  Description: INTERVENTIONS:  - Identify barriers to discharge w/patient and caregiver  - Arrange for needed discharge resources and transportation as appropriate  - Identify discharge learning needs (meds, wound care, etc )  - Arrange for interpretive services to assist at discharge as needed  - Refer to Case Management Department for coordinating discharge planning if the patient needs post-hospital services based on physician/advanced practitioner order or complex needs related to functional status, cognitive ability, or social support system  Outcome: Progressing

## 2020-09-06 NOTE — PROGRESS NOTES
Pt visible on unite, Social with  [facility]  Pt reported anxiety, and received her schedule medication for anxiety  Pt denies SI,HI   Pt requested trazodone for sleeping  And received trazodone for same reason   Will continue to monitor

## 2020-09-07 VITALS
OXYGEN SATURATION: 96 % | BODY MASS INDEX: 35.51 KG/M2 | SYSTOLIC BLOOD PRESSURE: 167 MMHG | HEIGHT: 63 IN | HEART RATE: 75 BPM | TEMPERATURE: 98.2 F | RESPIRATION RATE: 16 BRPM | WEIGHT: 200.4 LBS | DIASTOLIC BLOOD PRESSURE: 107 MMHG

## 2020-09-07 LAB
CHOLEST SERPL-MCNC: 109 MG/DL
GLUCOSE SERPL-MCNC: 130 MG/DL (ref 65–140)
GLUCOSE SERPL-MCNC: 149 MG/DL (ref 65–140)
HDLC SERPL-MCNC: 31 MG/DL
LDLC SERPL CALC-MCNC: 46 MG/DL
NONHDLC SERPL-MCNC: 78 MG/DL
TRIGL SERPL-MCNC: 161 MG/DL

## 2020-09-07 PROCEDURE — 99239 HOSP IP/OBS DSCHRG MGMT >30: CPT | Performed by: PSYCHIATRY & NEUROLOGY

## 2020-09-07 PROCEDURE — 80061 LIPID PANEL: CPT | Performed by: PSYCHIATRY & NEUROLOGY

## 2020-09-07 PROCEDURE — 82948 REAGENT STRIP/BLOOD GLUCOSE: CPT

## 2020-09-07 RX ORDER — GABAPENTIN 300 MG/1
600 CAPSULE ORAL 4 TIMES DAILY
Qty: 120 CAPSULE | Refills: 1 | Status: SHIPPED | OUTPATIENT
Start: 2020-09-07 | End: 2020-10-12 | Stop reason: HOSPADM

## 2020-09-07 RX ORDER — GLIPIZIDE 5 MG/1
5 TABLET ORAL
Qty: 30 TABLET | Refills: 0 | Status: SHIPPED | OUTPATIENT
Start: 2020-09-07 | End: 2020-10-12 | Stop reason: HOSPADM

## 2020-09-07 RX ORDER — LITHIUM CARBONATE 300 MG/1
300 CAPSULE ORAL EVERY MORNING
Qty: 30 CAPSULE | Refills: 0 | Status: SHIPPED | OUTPATIENT
Start: 2020-09-07 | End: 2020-10-12 | Stop reason: HOSPADM

## 2020-09-07 RX ORDER — PANTOPRAZOLE SODIUM 20 MG/1
20 TABLET, DELAYED RELEASE ORAL
Qty: 30 TABLET | Refills: 0 | Status: ON HOLD | OUTPATIENT
Start: 2020-09-08 | End: 2020-10-12

## 2020-09-07 RX ORDER — AMLODIPINE BESYLATE 5 MG/1
5 TABLET ORAL DAILY
Status: DISCONTINUED | OUTPATIENT
Start: 2020-09-07 | End: 2020-09-07

## 2020-09-07 RX ORDER — HYDROXYZINE 50 MG/1
50 TABLET, FILM COATED ORAL 2 TIMES DAILY PRN
Qty: 30 TABLET | Refills: 1 | Status: SHIPPED | OUTPATIENT
Start: 2020-09-07 | End: 2020-10-12 | Stop reason: HOSPADM

## 2020-09-07 RX ORDER — BUSPIRONE HYDROCHLORIDE 10 MG/1
20 TABLET ORAL 3 TIMES DAILY
Qty: 180 TABLET | Refills: 0 | Status: SHIPPED | OUTPATIENT
Start: 2020-09-07 | End: 2020-10-12 | Stop reason: HOSPADM

## 2020-09-07 RX ORDER — AMLODIPINE BESYLATE 5 MG/1
5 TABLET ORAL
Qty: 30 TABLET | Refills: 0 | Status: ON HOLD | OUTPATIENT
Start: 2020-09-07 | End: 2020-10-12

## 2020-09-07 RX ORDER — CLONIDINE HYDROCHLORIDE 0.1 MG/1
0.1 TABLET ORAL
Qty: 30 TABLET | Refills: 0 | Status: ON HOLD | OUTPATIENT
Start: 2020-09-07 | End: 2020-10-12

## 2020-09-07 RX ORDER — GABAPENTIN 300 MG/1
600 CAPSULE ORAL 4 TIMES DAILY
Status: DISCONTINUED | OUTPATIENT
Start: 2020-09-07 | End: 2020-09-07 | Stop reason: HOSPADM

## 2020-09-07 RX ORDER — LITHIUM CARBONATE 150 MG/1
450 CAPSULE ORAL
Qty: 30 CAPSULE | Refills: 0 | Status: SHIPPED | OUTPATIENT
Start: 2020-09-07 | End: 2020-10-12 | Stop reason: HOSPADM

## 2020-09-07 RX ORDER — TRAZODONE HYDROCHLORIDE 100 MG/1
100 TABLET ORAL
Qty: 30 TABLET | Refills: 0 | Status: SHIPPED | OUTPATIENT
Start: 2020-09-07 | End: 2020-10-12 | Stop reason: HOSPADM

## 2020-09-07 RX ORDER — AMLODIPINE BESYLATE 5 MG/1
5 TABLET ORAL
Status: DISCONTINUED | OUTPATIENT
Start: 2020-09-07 | End: 2020-09-07 | Stop reason: HOSPADM

## 2020-09-07 RX ORDER — CLONIDINE HYDROCHLORIDE 0.1 MG/1
0.1 TABLET ORAL
Status: DISCONTINUED | OUTPATIENT
Start: 2020-09-07 | End: 2020-09-07 | Stop reason: HOSPADM

## 2020-09-07 RX ADMIN — NICOTINE 1 PATCH: 7 PATCH TRANSDERMAL at 08:09

## 2020-09-07 RX ADMIN — METOPROLOL TARTRATE 12.5 MG: 25 TABLET ORAL at 08:07

## 2020-09-07 RX ADMIN — GLIPIZIDE 5 MG: 5 TABLET ORAL at 08:06

## 2020-09-07 RX ADMIN — Medication 1 TABLET: at 08:08

## 2020-09-07 RX ADMIN — FOLIC ACID 1000 MCG: 1 TABLET ORAL at 08:07

## 2020-09-07 RX ADMIN — HYDROXYZINE HYDROCHLORIDE 50 MG: 50 TABLET, FILM COATED ORAL at 09:50

## 2020-09-07 RX ADMIN — AMLODIPINE BESYLATE 5 MG: 5 TABLET ORAL at 11:13

## 2020-09-07 RX ADMIN — PANTOPRAZOLE SODIUM 20 MG: 20 TABLET, DELAYED RELEASE ORAL at 05:52

## 2020-09-07 RX ADMIN — BUSPIRONE HYDROCHLORIDE 20 MG: 10 TABLET ORAL at 08:08

## 2020-09-07 RX ADMIN — Medication 100 MG: at 08:08

## 2020-09-07 RX ADMIN — LITHIUM CARBONATE 300 MG: 300 CAPSULE, GELATIN COATED ORAL at 08:07

## 2020-09-07 RX ADMIN — GABAPENTIN 500 MG: 400 CAPSULE ORAL at 08:07

## 2020-09-07 RX ADMIN — GABAPENTIN 600 MG: 300 CAPSULE ORAL at 11:13

## 2020-09-07 RX ADMIN — CLONAZEPAM 1 MG: 1 TABLET ORAL at 08:08

## 2020-09-07 NOTE — DISCHARGE INSTR - APPOINTMENTS
Elvis Holloway RN, our [employer], will be calling you after your discharge, on the phone number that you provided  She will be available as an additional support, if needed  If you wish to speak with her, you may contact Dary Trevizo at [phone]

## 2020-09-07 NOTE — BH TRANSITION RECORD
Contact Information: If you have any questions, concerns, pended studies, tests and/or procedures, or emergencies regarding your inpatient behavioral health visit  Please contact [facility] behavioral health unit 3B [phone]  and ask to speak to a , nurse or physician  A contact is available 24 hours/ 7 days a week at this number  Summary of Procedures Performed During your Stay:  Below is a list of major procedures performed during your hospital stay and a summary of results:  - No major procedures performed  Pending Studies (From admission, onward)     Start     Ordered    09/08/20 0600  Lithium level  Morning draw      09/07/20 0924    09/07/20 0000  Lithium level      09/07/20 0953    Pending  Lipid panel  Morning draw      Pending              If studies are pending at discharge, follow up with your PCP and/or referring provider

## 2020-09-07 NOTE — PROGRESS NOTES
Pt reported anxiety related to going home  Pt reported she is anticipating a mess at home, lots of dog hair and cleaning that will need to be done and it is making her anxious  Asked pt to write a list of tasks and pt complied  50 mg Atarax was given for moderate anxiety

## 2020-09-07 NOTE — CASE MANAGEMENT
Patient is being discharged, no SI/HI, no psychosis or A/V  Patient is in agreement with discharge  No questions verbalized  Patient provided with after care appointment, discharge instructions and prescriptions  IMM, quality core measures, transition of care, discharge instructions, and treatment plan complete  Patient will be transported by [facility] at [facility] will continue to follow up as needed  Patient's discharge was faxed to outpatient providers

## 2020-09-07 NOTE — PLAN OF CARE
Problem: Alteration in Thoughts and Perception  Goal: Treatment Goal: Gain control of psychotic behaviors/thinking, reduce/eliminate presenting symptoms and demonstrate improved reality functioning upon discharge  9/7/2020 1250 by Tiffany Padgett RN  Outcome: Completed  9/7/2020 0920 by Tiffany Padgett RN  Outcome: Adequate for Discharge     Problem: Depression  Goal: Treatment Goal: Demonstrate behavioral control of depressive symptoms, verbalize feelings of improved mood/affect, and adopt new coping skills prior to discharge  9/7/2020 1250 by Tiffany Padgett RN  Outcome: Completed  9/7/2020 0920 by Tiffany Padgett RN  Outcome: Adequate for Discharge     Problem: Anxiety  Goal: Anxiety is at manageable level  Description: Interventions:  - Assess and monitor patient's anxiety level  - Monitor for signs and symptoms (heart palpitations, chest pain, shortness of breath, headaches, nausea, feeling jumpy, restlessness, irritable, apprehensive)  - Collaborate with interdisciplinary team and initiate plan and interventions as ordered    - [place] patient to unit/surroundings  - Explain treatment plan  - Encourage participation in care  - Encourage verbalization of concerns/fears  - Identify coping mechanisms  - Assist in developing anxiety-reducing skills  - Administer/offer alternative therapies  - Limit or eliminate stimulants  9/7/2020 1250 by Tiffany Padgett RN  Outcome: Completed  9/7/2020 0920 by Tiffany Padgett RN  Outcome: Adequate for Discharge     Problem: Ineffective Coping  Goal: Participates in unit activities  Description: Interventions:  - Provide therapeutic environment   - Provide required programming   - Redirect inappropriate behaviors   9/7/2020 1250 by Tiffany Padgett RN  Outcome: Completed  9/7/2020 0920 by Tiffany Padgett RN  Outcome: Adequate for Discharge     Problem: Ineffective Coping  Goal: Participates in unit activities  Description: Interventions:  - Provide therapeutic environment   - Provide required programming   - Redirect inappropriate behaviors   9/7/2020 1250 by Alfredo Pham RN  Outcome: Completed  9/7/2020 0920 by Alfredo Pham RN  Outcome: Adequate for Discharge     Problem: DISCHARGE PLANNING  Goal: Discharge to home or other facility with appropriate resources  Description: INTERVENTIONS:  - Identify barriers to discharge w/patient and caregiver  - Arrange for needed discharge resources and transportation as appropriate  - Identify discharge learning needs (meds, wound care, etc )  - Arrange for interpretive services to assist at discharge as needed  - Refer to Case Management Department for coordinating discharge planning if the patient needs post-hospital services based on physician/advanced practitioner order or complex needs related to functional status, cognitive ability, or social support system  Outcome: Adequate for Discharge

## 2020-09-07 NOTE — DISCHARGE INSTR - OTHER ORDERS
You are being discharged to:  [address] [place] [id number]   Phone: [address] Crisis Intervention: [address] Crisis Intervention: 898.433.6144  *National Suicide Prevention Lifeline:  4-857.757.1639  *Peer Support Talk Line (Seven days a week, 1:00 PM  9:00 PM) Call: 296.603.3641 or Text: [address] on [address] [facility] ([facility]) HELPLINE: [phone]/Website: www [facility] org  *Substance Abuse and [address] [facility]) [employer], which is a confidential, free, 24-hour-a-day, 365-day-a-year, information service for individuals and family members facing mental health and/or substance use disorders  This service provides referrals to local treatment facilities, support groups, and community-based organizations  Callers can also order free publications and other information  Call 4-303.163.6898/Website: www [facility] gov  *[facility] 2-1-1: This is a toll free, confidential, 24-hour-a-day service which connects you to a community  in your area who can help you find services and resources that are available to you locally and provide critical services that can improve and save lives  Call: 80  /Website: https://[employer]/       What you need to know aboutcoronavirus disease 2019 (COVID-19)     What is coronavirus disease 2019 (COVID-19)? Coronavirus disease 2019 (COVID-19) is a respiratory illness that can spread from person to person  The virus that causes COVID-19 is a novel coronavirus that was first identified during an investigation into an outbreak in [place]  Can people in [place]  get COVID-19? Yes  COVID-19 is spreading from person to person in parts of the [place]  Risk of infection with COVID-19 is higher for people who are close contacts of someone known to have COVID-19, for example healthcare workers, or household members   Other people at higher risk for infection are those who live in or have recently been in an area with ongoing spread of COVID-19  Learn more about places with ongoing spread at   [facility]  html#geographic  Have there been cases of COVID-19 in [place] ?   Yes  The first case of COVID-19 in the [place] was reported on January 21, 2020  The current count of cases of COVID-19 in the [place] is available on [employer] at [facility]  How does COVID-19 spread? The virus that causes COVID-19 probably emerged from an animal source, but is now spreading from person to person  The virus is thought to spread mainly between people who are in close contact with one another (within about 6 feet) through respiratory droplets produced when an infected person coughs or sneezes  It also may be possible that a person can get COVID-19 by touching a surface or object that has the virus on it and then touching their own mouth, nose, or possibly their eyes, but this is not thought to be the main way the virus spreads  Learn what is known about the spread of newly emerged coronaviruses at [facility]  What are the symptoms of COVID-19? Patients with COVID-19 have had mild to severe respiratory illness with symptoms of   fever   cough   shortness of breath  What are severe complications from this virus? Some patients have pneumonia in both lungs, multi-organ failure and in some cases death  How can I help protect myself? People can help protect themselves from respiratory illness with everyday preventive actions  Avoid close contact with people who are sick  Avoid touching your eyes, nose, and mouth withunwashed hands  Wash your hands often with soap and water for at least 20 seconds  Use an alcohol-based hand  that contains at least 60% alcohol if soap and water are not available    If you are sick, to keep from spreading respiratory illness to others, you should   Stay home when you are sick  Cover your cough or sneeze with a tissue, then throw the tissue in the trash  Clean and disinfect frequently touched objectsand surfaces  What should I do if I recently traveled from an area with ongoing spread of COVID-19? If you have traveled from an affected area, there may be restrictions on your movements for up to 2 weeks  If you develop symptoms during that period (fever, cough, trouble breathing), seek medical advice  Call the office of your health care provider before you go, and tell them about your travel and your symptoms  They will give you instructions on how to get care without exposing other people to your illness  While sick, avoid contact with people, don't go out and delay any travel to reduce the possibility of spreading illness to others  Is there a vaccine? There is currently no vaccine to protect against COVID-19  The best way to prevent infection is to take everyday preventive actions, like avoiding close contact with people who are sick and washing your hands often  Is there a treatment? There is no specific antiviral treatment for COVID-19  People with COVID-19 can seek medical care to helprelieve symptoms  For more information: www cdc gov/SPKOA58EI 882372-X 03/03/2020       What to do if you are sick withcoronavirus disease 2019 (COVID-19)     If you are sick with COVID-19 or suspect you are infected with the virus that causes COVID-19, follow the steps below to help prevent the disease from spreading to people in your home and community  Stay home except to get medical care   You should restrict activities outside your home, except for getting medical care  Do not go to work, school, or public areas  Avoid using public transportation, ride-sharing, or taxis    Separate yourself from other people and animals inyour home  People: As much as possible, you should stay in a specific room and away from other people in your home  Also, you should use a separate bathroom, if available  Animals: Do not handle pets or other animals while sick  See COVID-19 and Animals for more information  Call ahead before visiting your doctor   If you have a medical appointment, call the healthcare provider and tell them that you have or may have COVID-19  This will help the healthcare provider's office take steps to keep other people from getting infected or exposed  Wear a facemask  You should wear a facemask when you are around other people (e g , sharing a room or vehicle) or pets and before you enter a healthcare provider's office  If you are not able to wear a facemask (for example, because it causes trouble breathing), then people who live with you should not stay in the same room with you, or they should wear a facemask if they enteryour room  Cover your coughs and sneezes   Cover your mouth and nose with a tissue when you cough or sneeze  Throw used tissues in a lined trash can; immediately wash your hands with soap and water for at least 20 seconds or clean your hands with an alcohol-based hand  that contains at least 60 to 95% alcohol, covering all surfaces of your hands and rubbing them together until they feel dry  Soap and water should be used preferentially if hands are visibly dirty  Avoid sharing personal household items   You should not share dishes, drinking glasses, cups, eating utensils, towels, or bedding with other people or pets in your home  After using these items, they should be washed thoroughly with soap and water  Clean your hands often  Wash your hands often with soap and water for at least 20 seconds  If soap and water are not available, clean your hands with an alcohol-based hand  that contains at least 60% alcohol, covering all surfaces of your hands and rubbing them together until they feel dry   Soap and water should be used preferentially if hands are visibly dirty  Avoid touching your eyes, nose, and mouth with unwashed hands  Clean all "high-touch" surfaces every day  High touch surfaces include counters, tabletops, doorknobs, bathroom fixtures, toilets, phones, keyboards, tablets, and bedside tables  Also, clean any surfaces that may have blood, stool, or body fluids on them  Use a household cleaning spray or wipe, according to the label instructions  Labels contain instructions for safe and effective use of the cleaning product including precautions you should take when applying the product, such as wearing gloves and making sure you have good ventilation during use of the product  Monitor your symptoms  Seek prompt medical attention if your illness is worsening (e g , difficulty breathing)  Before seeking care, call your healthcare provider and tell them that you have, or are being evaluated for, COVID-19  Put on a facemask before you enter the facility  These steps will help the healthcare provider's office to keep other people in the office or waiting room from getting infectedor exposed  Ask your healthcare provider to call the local or state health department  Persons who are placed under active monitoring or facilitated self-monitoring should follow instructions provided by their local health department or occupational health professionals, as appropriate  If you have a medical emergency and need to call 911, notify the dispatch personnel that you have, or are being evaluated for COVID-19  If possible, put on a facemask before emergency medical services arrive  Discontinuing home isolation  Patients with confirmed COVID-19 should remain under home isolation precautions until the risk of secondary transmission to others is thought to be low  The decision to discontinue home isolation precautions should be made on a case-by-case basis, in consultation with healthcare providers and state and local health departments    For more information: www [facility] gov/[email or web address] [id number]-Z 02/24/2020       Stay home when you are sick,except to get medical care  Wash your hands often with soap and water for at least 20 seconds  Cover your cough or sneeze with a tissue, then throw the tissue in the trash  Clean and disinfect frequently touched objects and surfaces  Avoid touching your eyes, nose, and mouth  STOP THE SPREAD OF GERMS  For more information: www [facility] gov/COVID19 Avoid close contact with people who are sick  Help prevent the spread of respiratory diseases like COVID-19  HOW TO GET SUBSTANCE ABUSE HELP:  If you or someone you know has a drug or alcohol problem, there is help:  [address]: [address]: [phone]  An assessment is the first step  In addition to those listed there are other programs available in the area but assessment is best to determine an appropriate level of care  If you DO NOT have Medical Assistance (MA) or [employer], an assessment can be scheduled at one of these providers:  [address] [place] [address] Kenisha Elliott 13, [address]  [phone]   [address] [facility] 15 Durga Alfred , [place], [address] [facility] 75   Patience Montano Yluciana [place], [address]   Step by [address] [facility] [address] , [place], 98 [facility]  [phone]   Treatment Trends  [address]  [address] [facility] , [place], 98 [facility]  2000 [facility],[address] , [address], [place], [address]

## 2020-09-07 NOTE — PROGRESS NOTES
Migue Rasmussen  was seen for continuing care and reviewed with staff  Progress Note - Behavioral Health   Migue Rasmussen 48 y o  female MRN: @MRN   Unit/Bed#: Anatoliy Portillo 341-01 Encounter: [id number]       Report from staff regarding this patient received and discussed, and records reviewed prior to seeing this patient   Patient is more active, less anxious  Tolerated decrease of clonazepam well  Not sedated after slight increase of Neurontin  Decided to rescind her 72 hours letter and stay in the unit at least till Tuesday  Did not have suicidal thoughts  Was visible in the unit  Sleep: slept better  Appetite: normal    Medication side effects:no complaints    Mental Status Evaluation:    Appearance:  dressed in hospital attire   Behavior:  cooperative, calm, guarded   Mood:  improved, depressed, anxious   Affect: constricted    Speech:  slow   Thought Process:  concrete   Thought Content:  normal   Perceptual Disturbances: no auditory hallucinations, no visual hallucinations, denies auditory hallucinations when asked, does not appear responding to internal stimuli   Risk Potential: Suicidal ideation - None, contracts for safety on the unit  Homicidal ideation - None  Potential for aggression - No   Sensorium:  oriented to person, place and time   Memory:  recent and remote memory grossly intact   Consciousness:  alert and awake   Insight:  improving   Judgment: improved   Motor Activity: no abnormal movements   Gait/Station  normal gait/station, normal balance            Laboratory results:  I have personally reviewed all pertinent laboratory results  BMP: No results for input(s): NA, K, CL, CO2, BUN in the last 72 hours      Invalid input(s): CREA, GLU  Vitals:    09/06/20 1955   BP: 134/63   Pulse: 78   Resp: 16   Temp:    SpO2:         Medication Administration - last 24 hours from 09/05/2020 2212 to 09/06/2020 2212       Date/Time Order Dose Route Action Action by     09/06/2020 1008 nicotine (NICODERM CQ) 7 mg/24hr TD 24 hr patch 1 patch 1 patch Transdermal Medication Applied Lanice Plump, RN     09/06/2020 1007 nicotine (NICODERM CQ) 7 mg/24hr TD 24 hr patch 1 patch 1 patch Transdermal Patch Removed Lanice Plump, RN     09/06/2020 1008 glipiZIDE (GLUCOTROL) tablet 5 mg 5 mg Oral Given Lanice Plump, RN     09/06/2020 2129 lithium carbonate capsule 450 mg 450 mg Oral Given eYsenia Funk, RN     09/06/2020 0919 lithium carbonate capsule 300 mg 300 mg Oral Given Lanice Plump, RN     09/06/2020 2130 metoprolol tartrate (LOPRESSOR) partial tablet 12 5 mg 12 5 mg Oral Given Yesenia Funk, RN     09/06/2020 1002 metoprolol tartrate (LOPRESSOR) partial tablet 12 5 mg 12 5 mg Oral Given Lanice Pldebra, RN     09/06/2020 0622 pantoprazole (PROTONIX) EC tablet 20 mg 20 mg Oral Given Shabbir Valerio, RN     09/06/2020 2129 busPIRone (BUSPAR) tablet 20 mg 20 mg Oral Given Yesenia Funk, RN     09/06/2020 1630 busPIRone (BUSPAR) tablet 20 mg 20 mg Oral Given Yesenia Funk, RN     09/06/2020 0920 busPIRone (BUSPAR) tablet 20 mg 20 mg Oral Given Lanice Plump, RN     09/06/2020 1007 thiamine (VITAMIN B1) tablet 100 mg 100 mg Oral Given Lanice Plump, RN     50/74/2122 0658 folic acid (FOLVITE) tablet 1,000 mcg 1,000 mcg Oral Given Lanice Plump, RN     09/06/2020 0920 multivitamin-minerals (CENTRUM) tablet 1 tablet 1 tablet Oral Given Lanice Plump, RN     09/06/2020 1707 insulin lispro (HumaLOG) 100 units/mL subcutaneous injection 1-5 Units 1 Units Subcutaneous Not Given Yesenia Funk, RN     09/06/2020 1204 insulin lispro (HumaLOG) 100 units/mL subcutaneous injection 1-5 Units 1 Units Subcutaneous Not Given Lanice Pldebra, RN     09/06/2020 1007 insulin lispro (HumaLOG) 100 units/mL subcutaneous injection 1-5 Units 1 Units Subcutaneous Given Lanice Plump, RN     09/06/2020 2129 insulin lispro (HumaLOG) 100 units/mL subcutaneous injection 1-5 Units 1 Units Subcutaneous Given Yesenia Funk, RN     09/06/2020 1347 hydrOXYzine HCL (ATARAX) tablet 75 mg 75 mg Oral Given Cary Alljuli, RN     09/06/2020 2000 clonazePAM (KlonoPIN) tablet 1 mg 1 mg Oral Given Cherl Gent, RN     09/06/2020 0015 clonazePAM (KlonoPIN) tablet 1 mg 1 mg Oral Given Cary Kisha, RN     09/06/2020 2129 gabapentin (NEURONTIN) capsule 600 mg 600 mg Oral Given Cherl Gent, RN     09/06/2020 2129 cloNIDine (CATAPRES) tablet 0 05 mg 0 05 mg Oral Given Cherl Gent, RN     09/05/2020 2315 hydrOXYzine HCL (ATARAX) tablet 50 mg 50 mg Oral Given Ghassan Jessee, RN     09/06/2020 1707 gabapentin (NEURONTIN) capsule 500 mg 500 mg Oral Given Cherl Gent, RN     09/06/2020 1248 gabapentin (NEURONTIN) capsule 500 mg 500 mg Oral Given Cary Beard, RN     09/06/2020 0920 gabapentin (NEURONTIN) capsule 500 mg 500 mg Oral Given Cary Beard, RN              Assessment/Plan   Principal Problem:    Bipolar I disorder with anxious distress ([facility] 75 )  Active Problems:    Essential hypertension    Alcohol use disorder, moderate, dependence ([facility] )    Post-traumatic stress disorder, chronic    Generalized anxiety disorder    Type 2 diabetes mellitus without complication, without long-term current use of insulin (HCC)    Benzodiazepine dependence, continuous (HCC)    Non compliance w medication regimen    Acquired hypothyroidism    Hypokalemia    Hyperlipemia    Transaminitis    Cognitive dysfunction in bipolar disorder ([facility] 75 )    Medical clearance for psychiatric admission    Tobacco abuse          Current Facility-Administered Medications:     acetaminophen (TYLENOL) tablet 650 mg, 650 mg, Oral, Q6H PRN, Marium Martinez DO, 650 mg at 09/05/20 1834    busPIRone (BUSPAR) tablet 20 mg, 20 mg, Oral, TID, Felisha Toribio MD, 20 mg at 09/06/20 2129    clonazePAM (KlonoPIN) tablet 1 mg, 1 mg, Oral, BID, Felisha Toribio MD, 1 mg at 09/06/20 2000    cloNIDine (CATAPRES) tablet 0 05 mg, 0 05 mg, Oral, HS, Felisha Toribio MD, 0 05 mg at 14/59/85 2071    folic acid (FOLVITE) tablet 1,000 mcg, 1,000 mcg, Oral, Daily, Fran Crabtree MD, 1,000 mcg at 09/06/20 0920    gabapentin (NEURONTIN) capsule 500 mg, 500 mg, Oral, TID, Ced Holm MD, 500 mg at 09/06/20 1707    gabapentin (NEURONTIN) capsule 600 mg, 600 mg, Oral, HS, Ced Holm MD, 600 mg at 09/06/20 2129    glipiZIDE (GLUCOTROL) tablet 5 mg, 5 mg, Oral, Daily Before Breakfast, Ced Holm MD, 5 mg at 09/06/20 1008    hydrOXYzine HCL (ATARAX) tablet 50 mg, 50 mg, Oral, Q4H PRN, Ced Holm MD, 50 mg at 09/05/20 2315    hydrOXYzine HCL (ATARAX) tablet 75 mg, 75 mg, Oral, Q8H PRN, Ced Holm MD, 75 mg at 09/06/20 1347    insulin lispro (HumaLOG) 100 units/mL subcutaneous injection 1-5 Units, 1-5 Units, Subcutaneous, TID AC, 1 Units at 09/06/20 1007 **AND** Fingerstick Glucose (POCT), , , TID AC, Beverly Ptael PA-C    insulin lispro (HumaLOG) 100 units/mL subcutaneous injection 1-5 Units, 1-5 Units, Subcutaneous, HS, Beverly Patel PA-C, 1 Units at 09/06/20 2129    lithium carbonate capsule 300 mg, 300 mg, Oral, QAM, Ced Holm MD, 300 mg at 09/06/20 0919    lithium carbonate capsule 450 mg, 450 mg, Oral, HS, Ced Holm MD, 450 mg at 09/06/20 2129    metoprolol tartrate (LOPRESSOR) partial tablet 12 5 mg, 12 5 mg, Oral, Q12H [address], Ced Holm MD, 12 5 mg at 09/06/20 2130    multivitamin-minerals (CENTRUM) tablet 1 tablet, 1 tablet, Oral, Daily, Fran Crabtree MD, 1 tablet at 09/06/20 0920    nicotine (NICODERM CQ) 7 mg/24hr TD 24 hr patch 1 patch, 1 patch, Transdermal, Daily, Marium Martinez DO, 1 patch at 09/06/20 1008    OLANZapine (ZyPREXA) IM injection 10 mg, 10 mg, Intramuscular, Once PRN, Calos Brisk Borreggine, DO    OLANZapine (ZyPREXA) IM injection 5 mg, 5 mg, Intramuscular, Once PRN, Calos Brisk Borreggine, DO    pantoprazole (PROTONIX) EC tablet 20 mg, 20 mg, Oral, Early Morning, Ced Holm MD, 20 mg at 09/06/20 0622    thiamine (VITAMIN B1) tablet 100 mg, 100 mg, Oral, Daily, Samaria Suazo MD, 100 mg at 09/06/20 1007    Recommended Treatment:  After medication changes made yesterday, the patient is less anxious  Her affect improved she is less depressed  Her sleep improved as well  No need for medication adjustments today  Planned medication and treatment changes: All current active medications have been reviewed  Continue treatment with group therapy, milieu therapy, occupational therapy and medication management  Risks / Benefits of Treatment:    Risks, benefits, and possible side effects of medications explained to patient and patient verbalizes understanding and agreement for treatment  Planned medication and treatment changes: All current active medications have been reviewed  Continue treatment with group therapy, milieu therapy, occupational therapy and medication management  Counseling / Coordination of Care:    Patient's progress reviewed with nursing staff  ** Please Note: This note has been constructed using a voice recognition system   **

## 2020-09-07 NOTE — DISCHARGE INSTRUCTIONS
Anxiety   WHAT YOU SHOULD KNOW:   Anxiety is a condition that causes you to feel excessive worry, uneasiness, or fear  Family or work stress, smoking, caffeine, and alcohol can increase your risk for anxiety  Certain medicines or health conditions can also increase your risk  Anxiety may begin gradually, and can become a long-term condition if it is not managed or treated  AFTER YOU LEAVE:   Medicines:   · Medicines  can help you feel more calm and relaxed, and decrease your symptoms  · Take your medicine as directed  Contact your healthcare provider if you think your medicine is not helping or if you have side effects  Tell him if you are allergic to any medicine  Keep a list of the medicines, vitamins, and herbs you take  Include the amounts, and when and why you take them  Bring the list or the pill bottles to follow-up visits  Carry your medicine list with you in case of an emergency  Follow up with your healthcare provider within 2 weeks or as directed:  Write down your questions so you remember to ask them during your visits  Manage anxiety:   · Go to counseling as directed  Cognitive behavioral therapy can help you understand and change how you react to events that trigger your symptoms  · Find ways to manage your symptoms  Activities such as exercise, meditation, or listening to music can help you relax  · Practice deep breathing  Breathing can change how your body reacts to stress  Focus on taking slow, deep breaths several times a day, or during an anxiety attack  Breathe in through your nose, and out through your mouth  · Avoid caffeine  Caffeine can make your symptoms worse  Avoid foods or drinks that are meant to increase your energy level  · Limit or avoid alcohol  Ask your healthcare provider if alcohol is safe for you  You may not be able to drink alcohol if you take certain anxiety or depression medicines  Limit alcohol to 1 drink per day if you are a woman   Limit alcohol to 2 drinks per day if you are a man  A drink of alcohol is 12 ounces of beer, 5 ounces of wine, or 1½ ounces of liquor  Contact your healthcare provider if:   · Your symptoms get worse or do not get better with treatment  · You think your medicine may be causing side effects  · Your anxiety keeps you from doing your regular daily activities  · You have new symptoms since your last visit  · You have questions or concerns about your condition or care  Seek care immediately or call 911 if:   · You have chest pain, tightness, or heaviness that may spread to your shoulders, arms, jaw, neck, or back  · You feel like hurting yourself or someone else  · You feel dizzy, lightheaded, or faint  © 2014 [address] is for End User's use only and may not be sold, redistributed or otherwise used for commercial purposes  All illustrations and images included in CareNotes® are the copyrighted property of [employer]  or Dennis Hobson  The above information is an  only  It is not intended as medical advice for individual conditions or treatments  Talk to your doctor, nurse or pharmacist before following any medical regimen to see if it is safe and effective for you  Abuse of Alcohol   WHAT YOU NEED TO KNOW:   · Alcohol abuse is unhealthy drinking behavior  You may drink too much at one time once a week, or continue to drink too much daily  You continue to drink even though it causes problems  The problems can be alcohol related legal problems, or problems with work or relationships with family  · If you drink too much at one time, you are binge drinking  Binge drinking is when you have a large amount of alcohol in a short time  Your blood alcohol concentrations (MYLES) goes above 0 08 g/dLlevel during binge drinking  For men, this usually happens with more than 4 drinks in 2 hours  For women, it is more than 3 drinks in 2 hours   A drink is 12 ounces of beer, 4 ounces of wine, or 1½ ounces of liquor  DISCHARGE INSTRUCTIONS:   Call 911 for the following:   · You have sudden chest pain or trouble breathing  · You have a seizure or have shaking or trembling  · You were in an accident because of alcohol  Seek care immediately if:   · You want to harm yourself or others  · You have hallucinations (you see or hear things that are not real)  · You cannot stop vomiting or you vomit blood  Contact your healthcare provider if:   · You need help to stop drinking alcohol  · You have questions or concerns about your condition or care  Medicines:   · Vitamin supplements  may be given to treat low vitamin levels  Alcohol can make it hard for your body to absorb enough vitamins such as B1  Vitamin supplements may also be given to prevent alcohol related brain damage  · Take your medicine as directed  Contact your healthcare provider if you think your medicine is not helping or if you have side effects  Tell him or her if you are allergic to any medicine  Keep a list of the medicines, vitamins, and herbs you take  Include the amounts, and when and why you take them  Bring the list or the pill bottles to follow-up visits  Carry your medicine list with you in case of an emergency  Treatments or therapies you may need:   · Detoxification (detox) and withdrawal  is a program that helps you to safely get alcohol out of your body  Detox can also help get rid of the physical need to drink  Healthcare providers monitor the physical symptoms of withdrawal  They may give you medicines to help decrease nausea, dehydration, and seizures  Healthcare providers will also monitor your blood pressure, heart and breathing rates, and your temperature  Symptoms of anxiety, depression, and suicidal thoughts are also monitored and managed during detox  Healthcare providers may give you medicines for these symptoms and therapy sessions will be available to you   Detox is usually done at a detox center or in a hospital  Healthcare providers do not recommend that you try to detox at home or by yourself  Withdrawal symptoms may become life-threatening  The center can help you find 12 step programs or an individual therapist to help with emotional support after detox  · Inpatient and outpatient treatment  focus on your personal needs to help you stop drinking  Treatment helps you understand the reasons you abuse alcohol  Counselors and therapists provide you with support and help you find ways to cope instead of drinking  You may need inpatient treatment to provide a controlled environment  You may need outpatient treatment after your inpatient treatment is complete  · Alcohol aversion therapy  takes away the desire to drink by causing a negative reaction when you drink  Healthcare providers may give you medicines that cause nausea and vomiting when you drink alcohol  They may instead give you a medicine that decreases your urge to drink alcohol  These medicines are used to help you stop drinking or reduce the amount you drink  They can also help you avoid relapse  Follow up with your healthcare provider as directed:  Write down your questions so you remember to ask them during your visits  Avoid alcohol:  You should stop drinking entirely  Alcohol can damage your brain, heart, and liver  It also increases your risk for injury, high blood pressure, and certain types of cancer  Alcohol is dangerous when you combine it with certain medicines  Do not drive if you drink alcohol:  Make sure someone who has not been drinking can help you get home  Get support:  Most people need support to stop drinking alcohol  Mental health providers, support groups, rehabilitation centers, and your healthcare provider can provide support     For more information:   · Alcoholics Anonymous  Web Address: http://www barber info/  · Substance Abuse and Mental Health Services Administration  [address] [place] [address]  Web Address: https://[employer]/  © 2017 [address] Information is for End User's use only and may not be sold, redistributed or otherwise used for commercial purposes  All illustrations and images included in CareNotes® are the copyrighted property of [employer]  or Dennis Hobson  The above information is an  only  It is not intended as medical advice for individual conditions or treatments  Talk to your doctor, nurse or pharmacist before following any medical regimen to see if it is safe and effective for you

## 2020-09-07 NOTE — NURSING NOTE
Pt was escorted to [address]  Discharged to home  AVS was reviewed with pt and prescriptions were given to pt  Pt inquired about each medication asking what it was for and when to take it  Pt educated on same  Pt verbalized understanding

## 2020-09-07 NOTE — PLAN OF CARE
Problem: DISCHARGE PLANNING  Goal: Discharge to home or other facility with appropriate resources  Description: INTERVENTIONS:  - Identify barriers to discharge w/patient and caregiver  - Arrange for needed discharge resources and transportation as appropriate  - Identify discharge learning needs (meds, wound care, etc )  - Arrange for interpretive services to assist at discharge as needed  - Refer to Case Management Department for coordinating discharge planning if the patient needs post-hospital services based on physician/advanced practitioner order or complex needs related to functional status, cognitive ability, or social support system  Outcome: Completed     Patient not willing to stay for treatment any longer  Patient will be discharged at 1pm via [facility]

## 2020-09-14 NOTE — DISCHARGE SUMMARY
Discharge Summary - [address] 48 y o  female MRN: [id number]  Unit/Bed#: [facility]-01 Encounter: [id number]     Admission Date: 9/2/2020         Discharge Date: 9/7/2020      Attending Psychiatrist: GARY Carlton     Reason for Admission/HPI:      Patient with multiple inpatient psychiatric admission, significant history of psychiatric mood disorder complicated by psychotic symptoms with auditory hallucinations and possible substance abuse, was recently increased clonazepam by previous providers, was admitted and anxious depressed state  Hospital Course:         Ms Aparna Ashby was admitted and all appropriate precautions were started  Pt was oriented to the unit  Her treatment, including medication management and therapy was discussed with the patient, and  she agreed  Risks, benefits, and possible side effects of medications explained to patient including risk of kidney impairment related to treatment with Lithium and risks of misuse, abuse or dependence, sedation and respiratory depression related to treatment with benzodiazepine medications  The patient verbalizes understanding and agreement for treatment  During the hospitalization the patient was encouraged to attend individual therapy, group therapy, milieu therapy and occupational therapy  Patient condition significantly improved after her  medications were started  Pt agreed to start her/his medication after discussion of potential benefits and side effects  The patient was initially concentrated on her clonazepam he, however available record indicated the patient also had history of abusing alcohol, and her clonazepam was just recently started with initial dose 1 mg twice a day with most recent increased to 1 mg 3 times a day    The writer reviewed the patient's medications and past and present history, made a decision to lower clonazepam back to 1 mg twice a day to address the patient potential developing benzodiazepine dependence, and also because clonazepam is not indicated for patients who are not in stable recovery from alcohol  The patient was provided such motivational interview, she was initially reluctant to make changes being in pre contemplation state of her recovery, however later she agreed that decrease of clonazepam was in acceptable idea and later she even stated she wants to stop clonazepam altogether with time  Patient's nighttime Neurontin was increased to address the patient's potential side effects of decreasing clonazepam and also help the patient with a insomnia  The patient tolerated such increase of Neurontin well  The patient BuSpar was restarted and Atarax was provided as safe alternative to clonazepam to treat patient's anxiety  The patient was not significantly depressed when we made medication changes, her was restarted and she felt that her depression was under control without any significant mood swings  She denies suicidal thoughts  I discussed the medication regimen and possible side effects of the medications with the patient prior to discharge  At the time of discharge Ms Smith was tolerating psychiatric medications  Please see After Discharge Summary for a completed list of discharge medications  Safety plan was discussed and approved by the patient  Cj Hdez was not manic, depressed, angry, or confused or psychotic on a day of discharge and was accountable for her actions  I discussed outpatient follow up with the patient, was arranged by the unit  upon discharge  Safety plan was discussed and approved by Ms Cr Strong  Reviewed with the patient importance of compliance with medications and outpatient treatment after discharge  The patient was competent to understand of risks and benefits of her actions         Mental Status at time of Discharge:     Mental Status Evaluation:    Appearance:  dressed appropriately, casually dressed   Behavior:  cooperative, calm   Mood: improved, anxious   Affect: normal range and intensity, appropriate    Speech:  normal rate and volume   Language: appropriate   Thought Process:  concrete   Associations: concrete associations   Thought Content:  normal   Perceptual Disturbances: no auditory hallucinations, no visual hallucinations, denies auditory hallucinations when asked, does not appear responding to internal stimuli   Risk Potential: Suicidal ideation - None  Homicidal ideation - None  Potential for aggression - No   Sensorium:  oriented to person, place and time   Memory:  recent and remote memory grossly intact   Consciousness:  alert and awake   Attention: attention span and concentration are normal   Intellect: normal   Fund of Knowledge: awareness of current events appropriate   Insight:  improved   Judgment: improved   Muscle Tone: normal   Gait/Station: normal gait/station and normal balance   Motor Activity: no abnormal movements         Discharge Diagnosis:   Patient Active Problem List   Diagnosis    Essential hypertension    [facility] use disorder, moderate, dependence [facility])    Post-traumatic stress disorder, chronic    Generalized anxiety disorder    [facility]    Type 2 diabetes mellitus without complication, without long-term current use of insulin ([facility] )    Bipolar I disorder with anxious distress ([facility] )    Benzodiazepine dependence, continuous ([facility] )    Non compliance w medication regimen    Acquired hypothyroidism    Hypokalemia    Hyperlipemia    Transaminitis    Cognitive dysfunction in bipolar disorder ([facility] )    Medical clearance for psychiatric admission    Tobacco abuse       Discharge Medications:  See after visit summary for reconciled discharge medications provided to patient and family  Discharge instructions/Information to patient and family:   See after visit summary for information provided to patient and family        Provisions for Follow-Up Care:  See after visit summary for information related to follow-up care and any pertinent home health orders  Discharge Statement   I spent 35 minutes discharging the patient  This time was spent on the day of discharge  I had direct contact with the patient on the day of discharge  Additional documentation is required if more than 30 minutes were spent on discharge  Motivational interview to further decrease patient's clonazepam slowly was a goal to completely stop clonazepam and approach anxiety with the help of therapy and non addictive medications was provided  Patient is was receptive  Clonazepam was not provided as a prescription on the day of discharge because the patient already had her clonazepam prescription field recently  The patient was informed that her clonazepam would not provided because she should have her clonazepam already at home, so writer again encouraged her to taper clonazepam as with started in outpatient psychiatric unit  Portions of the record may have been created with voice recognition software

## 2020-09-25 ENCOUNTER — HOSPITAL ENCOUNTER (INPATIENT)
Facility: HOSPITAL | Age: 55
LOS: 1 days | DRG: 885 | End: 2020-09-26
Attending: STUDENT IN AN ORGANIZED HEALTH CARE EDUCATION/TRAINING PROGRAM | Admitting: PSYCHIATRY & NEUROLOGY
Payer: MEDICARE

## 2020-09-25 ENCOUNTER — HOSPITAL ENCOUNTER (EMERGENCY)
Facility: HOSPITAL | Age: 55
End: 2020-09-25
Attending: EMERGENCY MEDICINE
Payer: MEDICARE

## 2020-09-25 VITALS
DIASTOLIC BLOOD PRESSURE: 78 MMHG | TEMPERATURE: 98 F | OXYGEN SATURATION: 99 % | SYSTOLIC BLOOD PRESSURE: 138 MMHG | RESPIRATION RATE: 20 BRPM | WEIGHT: 188.71 LBS | HEART RATE: 98 BPM | BODY MASS INDEX: 33.43 KG/M2

## 2020-09-25 DIAGNOSIS — F43.12 POST-TRAUMATIC STRESS DISORDER, CHRONIC: Primary | Chronic | ICD-10-CM

## 2020-09-25 DIAGNOSIS — F31.4 BIPOLAR DISORDER, CURRENT EPISODE DEPRESSED, SEVERE, WITHOUT PSYCHOTIC FEATURES (HCC): ICD-10-CM

## 2020-09-25 DIAGNOSIS — T56.891A LITHIUM TOXICITY, ACCIDENTAL OR UNINTENTIONAL, INITIAL ENCOUNTER: ICD-10-CM

## 2020-09-25 DIAGNOSIS — E87.6 HYPOKALEMIA: ICD-10-CM

## 2020-09-25 LAB
ALBUMIN SERPL BCP-MCNC: 4.4 G/DL (ref 3–5.2)
ALP SERPL-CCNC: 224 U/L (ref 43–122)
ALT SERPL W P-5'-P-CCNC: 62 U/L (ref 9–52)
AMPHETAMINES SERPL QL SCN: NEGATIVE
ANION GAP SERPL CALCULATED.3IONS-SCNC: 10 MMOL/L (ref 5–14)
AST SERPL W P-5'-P-CCNC: 40 U/L (ref 14–36)
BACTERIA UR QL AUTO: ABNORMAL /HPF
BARBITURATES UR QL: NEGATIVE
BASOPHILS # BLD AUTO: 0 THOUSANDS/ΜL (ref 0–0.1)
BASOPHILS NFR BLD AUTO: 0 % (ref 0–1)
BENZODIAZ UR QL: NEGATIVE
BILIRUB SERPL-MCNC: 1.1 MG/DL
BILIRUB UR QL STRIP: NEGATIVE
BUN SERPL-MCNC: 17 MG/DL (ref 5–25)
CALCIUM SERPL-MCNC: 10.8 MG/DL (ref 8.4–10.2)
CHLORIDE SERPL-SCNC: 99 MMOL/L (ref 97–108)
CK MB SERPL-MCNC: 1.2 % (ref 0–2.5)
CK MB SERPL-MCNC: 6 NG/ML (ref 0–2.4)
CK SERPL-CCNC: 520 U/L (ref 30–135)
CLARITY UR: ABNORMAL
CO2 SERPL-SCNC: 26 MMOL/L (ref 22–30)
COCAINE UR QL: NEGATIVE
COLOR UR: ABNORMAL
CREAT SERPL-MCNC: 0.74 MG/DL (ref 0.6–1.2)
EOSINOPHIL # BLD AUTO: 0 THOUSAND/ΜL (ref 0–0.4)
EOSINOPHIL NFR BLD AUTO: 0 % (ref 0–6)
ERYTHROCYTE [DISTWIDTH] IN BLOOD BY AUTOMATED COUNT: 14.5 %
ETHANOL SERPL-MCNC: <10 MG/DL (ref 0–10)
GFR SERPL CREATININE-BSD FRML MDRD: 93 ML/MIN/1.73SQ M
GLUCOSE SERPL-MCNC: 193 MG/DL (ref 70–99)
GLUCOSE SERPL-MCNC: 209 MG/DL (ref 65–140)
GLUCOSE UR STRIP-MCNC: NEGATIVE MG/DL
HCT VFR BLD AUTO: 39.1 % (ref 36–46)
HGB BLD-MCNC: 13.8 G/DL (ref 12–16)
HGB UR QL STRIP.AUTO: NEGATIVE
KETONES UR STRIP-MCNC: ABNORMAL MG/DL
LEUKOCYTE ESTERASE UR QL STRIP: 25
LITHIUM SERPL-SCNC: 1.5 MMOL/L (ref 0.6–1.2)
LYMPHOCYTES # BLD AUTO: 1.5 THOUSANDS/ΜL (ref 0.5–4)
LYMPHOCYTES NFR BLD AUTO: 11 % (ref 25–45)
MCH RBC QN AUTO: 32.6 PG (ref 26–34)
MCHC RBC AUTO-ENTMCNC: 35.3 G/DL (ref 31–36)
MCV RBC AUTO: 92 FL (ref 80–100)
METHADONE UR QL: NEGATIVE
MONOCYTES # BLD AUTO: 1.3 THOUSAND/ΜL (ref 0.2–0.9)
MONOCYTES NFR BLD AUTO: 10 % (ref 1–10)
NEUTROPHILS # BLD AUTO: 10 THOUSANDS/ΜL (ref 1.8–7.8)
NEUTS SEG NFR BLD AUTO: 78 % (ref 45–65)
NITRITE UR QL STRIP: NEGATIVE
NON-SQ EPI CELLS URNS QL MICRO: ABNORMAL /HPF
OPIATES UR QL SCN: NEGATIVE
OXYCODONE+OXYMORPHONE UR QL SCN: NEGATIVE
PCP UR QL: NEGATIVE
PH UR STRIP.AUTO: 6 [PH]
PLATELET # BLD AUTO: 304 THOUSANDS/UL (ref 150–450)
PMV BLD AUTO: 8.1 FL (ref 8.9–12.7)
POTASSIUM SERPL-SCNC: 3.1 MMOL/L (ref 3.6–5)
PROT SERPL-MCNC: 7.5 G/DL (ref 5.9–8.4)
PROT UR STRIP-MCNC: ABNORMAL MG/DL
RBC # BLD AUTO: 4.24 MILLION/UL (ref 4–5.2)
RBC #/AREA URNS AUTO: ABNORMAL /HPF
SARS-COV-2 RNA RESP QL NAA+PROBE: NEGATIVE
SODIUM SERPL-SCNC: 135 MMOL/L (ref 137–147)
SP GR UR STRIP.AUTO: 1.02 (ref 1–1.04)
T4 FREE SERPL-MCNC: 3.45 NG/DL (ref 0.76–1.46)
THC UR QL: POSITIVE
TSH SERPL DL<=0.05 MIU/L-ACNC: 0.05 UIU/ML (ref 0.47–4.68)
UROBILINOGEN UA: 1 MG/DL
WBC # BLD AUTO: 12.9 THOUSAND/UL (ref 4.5–11)
WBC #/AREA URNS AUTO: ABNORMAL /HPF

## 2020-09-25 PROCEDURE — 87635 SARS-COV-2 COVID-19 AMP PRB: CPT | Performed by: PHYSICIAN ASSISTANT

## 2020-09-25 PROCEDURE — 80178 ASSAY OF LITHIUM: CPT | Performed by: PHYSICIAN ASSISTANT

## 2020-09-25 PROCEDURE — 96372 THER/PROPH/DIAG INJ SC/IM: CPT

## 2020-09-25 PROCEDURE — 99283 EMERGENCY DEPT VISIT LOW MDM: CPT | Performed by: PSYCHIATRY & NEUROLOGY

## 2020-09-25 PROCEDURE — 84439 ASSAY OF FREE THYROXINE: CPT | Performed by: PHYSICIAN ASSISTANT

## 2020-09-25 PROCEDURE — 84443 ASSAY THYROID STIM HORMONE: CPT | Performed by: PHYSICIAN ASSISTANT

## 2020-09-25 PROCEDURE — 80307 DRUG TEST PRSMV CHEM ANLYZR: CPT | Performed by: PHYSICIAN ASSISTANT

## 2020-09-25 PROCEDURE — 82550 ASSAY OF CK (CPK): CPT | Performed by: PHYSICIAN ASSISTANT

## 2020-09-25 PROCEDURE — 99285 EMERGENCY DEPT VISIT HI MDM: CPT

## 2020-09-25 PROCEDURE — 85025 COMPLETE CBC W/AUTO DIFF WBC: CPT | Performed by: PHYSICIAN ASSISTANT

## 2020-09-25 PROCEDURE — 80053 COMPREHEN METABOLIC PANEL: CPT | Performed by: PHYSICIAN ASSISTANT

## 2020-09-25 PROCEDURE — 82553 CREATINE MB FRACTION: CPT | Performed by: PHYSICIAN ASSISTANT

## 2020-09-25 PROCEDURE — 81003 URINALYSIS AUTO W/O SCOPE: CPT | Performed by: PHYSICIAN ASSISTANT

## 2020-09-25 PROCEDURE — 99284 EMERGENCY DEPT VISIT MOD MDM: CPT | Performed by: PHYSICIAN ASSISTANT

## 2020-09-25 PROCEDURE — 93005 ELECTROCARDIOGRAM TRACING: CPT

## 2020-09-25 PROCEDURE — 36415 COLL VENOUS BLD VENIPUNCTURE: CPT | Performed by: PHYSICIAN ASSISTANT

## 2020-09-25 PROCEDURE — 80320 DRUG SCREEN QUANTALCOHOLS: CPT | Performed by: PHYSICIAN ASSISTANT

## 2020-09-25 PROCEDURE — 82948 REAGENT STRIP/BLOOD GLUCOSE: CPT

## 2020-09-25 PROCEDURE — 81001 URINALYSIS AUTO W/SCOPE: CPT | Performed by: PHYSICIAN ASSISTANT

## 2020-09-25 RX ORDER — DIPHENHYDRAMINE HYDROCHLORIDE 50 MG/ML
50 INJECTION INTRAMUSCULAR; INTRAVENOUS ONCE
Status: COMPLETED | OUTPATIENT
Start: 2020-09-25 | End: 2020-09-25

## 2020-09-25 RX ORDER — LEVOTHYROXINE SODIUM 0.05 MG/1
50 TABLET ORAL DAILY
COMMUNITY
Start: 2020-06-11 | End: 2020-10-12 | Stop reason: HOSPADM

## 2020-09-25 RX ORDER — OLANZAPINE 10 MG/1
10 INJECTION, POWDER, LYOPHILIZED, FOR SOLUTION INTRAMUSCULAR
Status: CANCELLED | OUTPATIENT
Start: 2020-09-25

## 2020-09-25 RX ORDER — LORAZEPAM 0.5 MG/1
0.5 TABLET ORAL EVERY 6 HOURS PRN
Status: CANCELLED | OUTPATIENT
Start: 2020-09-25

## 2020-09-25 RX ORDER — OLANZAPINE 5 MG/1
2.5 TABLET ORAL
Status: CANCELLED | OUTPATIENT
Start: 2020-09-25

## 2020-09-25 RX ORDER — LORAZEPAM 0.5 MG/1
1 TABLET ORAL EVERY 6 HOURS PRN
Status: CANCELLED | OUTPATIENT
Start: 2020-09-25

## 2020-09-25 RX ORDER — CLONAZEPAM 0.5 MG/1
0.5 TABLET ORAL ONCE
Status: COMPLETED | OUTPATIENT
Start: 2020-09-25 | End: 2020-09-25

## 2020-09-25 RX ORDER — LURASIDONE HYDROCHLORIDE 120 MG/1
TABLET, FILM COATED ORAL
COMMUNITY
Start: 2020-07-01 | End: 2020-10-12 | Stop reason: HOSPADM

## 2020-09-25 RX ORDER — LORAZEPAM 2 MG/ML
2 INJECTION INTRAMUSCULAR EVERY 6 HOURS PRN
Status: CANCELLED | OUTPATIENT
Start: 2020-09-25

## 2020-09-25 RX ORDER — ACETAMINOPHEN 325 MG/1
650 TABLET ORAL EVERY 6 HOURS PRN
Status: CANCELLED | OUTPATIENT
Start: 2020-09-25

## 2020-09-25 RX ORDER — BENZTROPINE MESYLATE 0.5 MG/1
1 TABLET ORAL 2 TIMES DAILY PRN
Status: CANCELLED | OUTPATIENT
Start: 2020-09-25

## 2020-09-25 RX ORDER — OLANZAPINE 10 MG/1
5 INJECTION, POWDER, LYOPHILIZED, FOR SOLUTION INTRAMUSCULAR
Status: CANCELLED | OUTPATIENT
Start: 2020-09-25

## 2020-09-25 RX ORDER — MAGNESIUM HYDROXIDE/ALUMINUM HYDROXICE/SIMETHICONE 120; 1200; 1200 MG/30ML; MG/30ML; MG/30ML
30 SUSPENSION ORAL EVERY 4 HOURS PRN
Status: CANCELLED | OUTPATIENT
Start: 2020-09-25

## 2020-09-25 RX ORDER — POTASSIUM CHLORIDE 750 MG/1
40 TABLET, EXTENDED RELEASE ORAL ONCE
Status: COMPLETED | OUTPATIENT
Start: 2020-09-25 | End: 2020-09-25

## 2020-09-25 RX ORDER — TRAZODONE HYDROCHLORIDE 100 MG/1
50 TABLET ORAL
Status: CANCELLED | OUTPATIENT
Start: 2020-09-25

## 2020-09-25 RX ORDER — CLONAZEPAM 0.5 MG/1
1 TABLET ORAL 2 TIMES DAILY
Status: DISCONTINUED | OUTPATIENT
Start: 2020-09-25 | End: 2020-09-26 | Stop reason: HOSPADM

## 2020-09-25 RX ORDER — NICOTINE 21 MG/24HR
1 PATCH, TRANSDERMAL 24 HOURS TRANSDERMAL DAILY
Status: CANCELLED | OUTPATIENT
Start: 2020-09-26

## 2020-09-25 RX ADMIN — POTASSIUM CHLORIDE 40 MEQ: 750 TABLET, EXTENDED RELEASE ORAL at 17:12

## 2020-09-25 RX ADMIN — CLONAZEPAM 0.5 MG: 0.5 TABLET ORAL at 16:31

## 2020-09-25 RX ADMIN — DIPHENHYDRAMINE HYDROCHLORIDE 50 MG: 50 INJECTION INTRAMUSCULAR; INTRAVENOUS at 11:53

## 2020-09-25 RX ADMIN — CLONAZEPAM 1 MG: 0.5 TABLET ORAL at 17:12

## 2020-09-26 ENCOUNTER — APPOINTMENT (EMERGENCY)
Dept: CT IMAGING | Facility: HOSPITAL | Age: 55
DRG: 885 | End: 2020-09-26
Payer: MEDICARE

## 2020-09-26 ENCOUNTER — HOSPITAL ENCOUNTER (INPATIENT)
Facility: HOSPITAL | Age: 55
LOS: 3 days | DRG: 885 | End: 2020-09-29
Attending: EMERGENCY MEDICINE | Admitting: INTERNAL MEDICINE
Payer: MEDICARE

## 2020-09-26 VITALS
HEART RATE: 95 BPM | HEIGHT: 63 IN | WEIGHT: 178.4 LBS | SYSTOLIC BLOOD PRESSURE: 135 MMHG | DIASTOLIC BLOOD PRESSURE: 76 MMHG | OXYGEN SATURATION: 99 % | TEMPERATURE: 98.6 F | BODY MASS INDEX: 31.61 KG/M2 | RESPIRATION RATE: 24 BRPM

## 2020-09-26 DIAGNOSIS — E03.9 HYPOTHYROIDISM: ICD-10-CM

## 2020-09-26 DIAGNOSIS — F43.12 POST-TRAUMATIC STRESS DISORDER, CHRONIC: Chronic | ICD-10-CM

## 2020-09-26 DIAGNOSIS — F31.4 BIPOLAR DISORDER, CURRENT EPISODE DEPRESSED, SEVERE, WITHOUT PSYCHOTIC FEATURES (HCC): ICD-10-CM

## 2020-09-26 DIAGNOSIS — E87.6 ACUTE HYPOKALEMIA: ICD-10-CM

## 2020-09-26 DIAGNOSIS — T56.891A LITHIUM TOXICITY: Primary | ICD-10-CM

## 2020-09-26 PROBLEM — R79.89 ELEVATED LITHIUM LEVEL: Status: ACTIVE | Noted: 2020-09-26

## 2020-09-26 LAB
ALBUMIN SERPL BCP-MCNC: 4 G/DL (ref 3.5–5)
ALP SERPL-CCNC: 236 U/L (ref 46–116)
ALT SERPL W P-5'-P-CCNC: 63 U/L (ref 12–78)
AMMONIA PLAS-SCNC: <10 UMOL/L (ref 11–35)
AMPHETAMINES SERPL QL SCN: NEGATIVE
ANION GAP SERPL CALCULATED.3IONS-SCNC: 8 MMOL/L (ref 4–13)
APAP SERPL-MCNC: <2 UG/ML (ref 10–20)
AST SERPL W P-5'-P-CCNC: 39 U/L (ref 5–45)
ATRIAL RATE: 104 BPM
BARBITURATES UR QL: NEGATIVE
BASOPHILS # BLD AUTO: 0.04 THOUSANDS/ΜL (ref 0–0.1)
BASOPHILS NFR BLD AUTO: 0 % (ref 0–1)
BENZODIAZ UR QL: NEGATIVE
BILIRUB SERPL-MCNC: 0.8 MG/DL (ref 0.2–1)
BILIRUB UR QL STRIP: NEGATIVE
BUN SERPL-MCNC: 14 MG/DL (ref 5–25)
CALCIUM SERPL-MCNC: 10.2 MG/DL (ref 8.3–10.1)
CHLORIDE SERPL-SCNC: 99 MMOL/L (ref 100–108)
CK MB SERPL-MCNC: 2.7 NG/ML (ref 0–5)
CK MB SERPL-MCNC: <1 % (ref 0–2.5)
CK SERPL-CCNC: 369 U/L (ref 26–192)
CLARITY UR: CLEAR
CO2 SERPL-SCNC: 29 MMOL/L (ref 21–32)
COCAINE UR QL: NEGATIVE
COLOR UR: YELLOW
CREAT SERPL-MCNC: 0.95 MG/DL (ref 0.6–1.3)
EOSINOPHIL # BLD AUTO: 0.12 THOUSAND/ΜL (ref 0–0.61)
EOSINOPHIL NFR BLD AUTO: 1 % (ref 0–6)
ERYTHROCYTE [DISTWIDTH] IN BLOOD BY AUTOMATED COUNT: 13.3 % (ref 11.6–15.1)
ETHANOL SERPL-MCNC: <3 MG/DL (ref 0–3)
GFR SERPL CREATININE-BSD FRML MDRD: 69 ML/MIN/1.73SQ M
GLUCOSE SERPL-MCNC: 153 MG/DL (ref 65–140)
GLUCOSE SERPL-MCNC: 158 MG/DL (ref 65–140)
GLUCOSE SERPL-MCNC: 179 MG/DL (ref 65–140)
GLUCOSE SERPL-MCNC: 182 MG/DL (ref 65–140)
GLUCOSE SERPL-MCNC: 187 MG/DL (ref 65–140)
GLUCOSE SERPL-MCNC: 206 MG/DL (ref 65–140)
GLUCOSE UR STRIP-MCNC: NEGATIVE MG/DL
HCT VFR BLD AUTO: 39.1 % (ref 34.8–46.1)
HGB BLD-MCNC: 13.5 G/DL (ref 11.5–15.4)
HGB UR QL STRIP.AUTO: NEGATIVE
IMM GRANULOCYTES # BLD AUTO: 0.05 THOUSAND/UL (ref 0–0.2)
IMM GRANULOCYTES NFR BLD AUTO: 0 % (ref 0–2)
KETONES UR STRIP-MCNC: ABNORMAL MG/DL
LEUKOCYTE ESTERASE UR QL STRIP: NEGATIVE
LITHIUM SERPL-SCNC: 1.3 MMOL/L (ref 0.5–1)
LYMPHOCYTES # BLD AUTO: 2.39 THOUSANDS/ΜL (ref 0.6–4.47)
LYMPHOCYTES NFR BLD AUTO: 20 % (ref 14–44)
MAGNESIUM SERPL-MCNC: 2.2 MG/DL (ref 1.6–2.6)
MCH RBC QN AUTO: 31.8 PG (ref 26.8–34.3)
MCHC RBC AUTO-ENTMCNC: 34.5 G/DL (ref 31.4–37.4)
MCV RBC AUTO: 92 FL (ref 82–98)
METHADONE UR QL: NEGATIVE
MONOCYTES # BLD AUTO: 1.32 THOUSAND/ΜL (ref 0.17–1.22)
MONOCYTES NFR BLD AUTO: 11 % (ref 4–12)
NEUTROPHILS # BLD AUTO: 8.32 THOUSANDS/ΜL (ref 1.85–7.62)
NEUTS SEG NFR BLD AUTO: 68 % (ref 43–75)
NITRITE UR QL STRIP: NEGATIVE
NRBC BLD AUTO-RTO: 0 /100 WBCS
OPIATES UR QL SCN: NEGATIVE
OXYCODONE+OXYMORPHONE UR QL SCN: NEGATIVE
P AXIS: 61 DEGREES
PCP UR QL: NEGATIVE
PH UR STRIP.AUTO: 7 [PH]
PLATELET # BLD AUTO: 315 THOUSANDS/UL (ref 149–390)
PMV BLD AUTO: 10.3 FL (ref 8.9–12.7)
POTASSIUM SERPL-SCNC: 3 MMOL/L (ref 3.5–5.3)
PR INTERVAL: 188 MS
PROT SERPL-MCNC: 7.3 G/DL (ref 6.4–8.2)
PROT UR STRIP-MCNC: NEGATIVE MG/DL
QRS AXIS: 34 DEGREES
QRSD INTERVAL: 80 MS
QT INTERVAL: 352 MS
QTC INTERVAL: 462 MS
RBC # BLD AUTO: 4.24 MILLION/UL (ref 3.81–5.12)
SALICYLATES SERPL-MCNC: <3 MG/DL (ref 3–20)
SODIUM SERPL-SCNC: 136 MMOL/L (ref 136–145)
SP GR UR STRIP.AUTO: 1.01 (ref 1–1.03)
T WAVE AXIS: 33 DEGREES
THC UR QL: POSITIVE
TSH SERPL DL<=0.05 MIU/L-ACNC: 0.05 UIU/ML (ref 0.36–3.74)
UROBILINOGEN UR QL STRIP.AUTO: 1 E.U./DL
VENTRICULAR RATE: 104 BPM
WBC # BLD AUTO: 12.24 THOUSAND/UL (ref 4.31–10.16)

## 2020-09-26 PROCEDURE — 81003 URINALYSIS AUTO W/O SCOPE: CPT | Performed by: EMERGENCY MEDICINE

## 2020-09-26 PROCEDURE — 99285 EMERGENCY DEPT VISIT HI MDM: CPT | Performed by: EMERGENCY MEDICINE

## 2020-09-26 PROCEDURE — 82948 REAGENT STRIP/BLOOD GLUCOSE: CPT

## 2020-09-26 PROCEDURE — 83735 ASSAY OF MAGNESIUM: CPT | Performed by: EMERGENCY MEDICINE

## 2020-09-26 PROCEDURE — G0508 CRIT CARE TELEHEA CONSULT 60: HCPCS | Performed by: EMERGENCY MEDICINE

## 2020-09-26 PROCEDURE — 85025 COMPLETE CBC W/AUTO DIFF WBC: CPT | Performed by: EMERGENCY MEDICINE

## 2020-09-26 PROCEDURE — 99223 1ST HOSP IP/OBS HIGH 75: CPT | Performed by: INTERNAL MEDICINE

## 2020-09-26 PROCEDURE — 70450 CT HEAD/BRAIN W/O DYE: CPT

## 2020-09-26 PROCEDURE — 80320 DRUG SCREEN QUANTALCOHOLS: CPT | Performed by: EMERGENCY MEDICINE

## 2020-09-26 PROCEDURE — 96361 HYDRATE IV INFUSION ADD-ON: CPT

## 2020-09-26 PROCEDURE — 80178 ASSAY OF LITHIUM: CPT | Performed by: EMERGENCY MEDICINE

## 2020-09-26 PROCEDURE — 82553 CREATINE MB FRACTION: CPT | Performed by: EMERGENCY MEDICINE

## 2020-09-26 PROCEDURE — 93010 ELECTROCARDIOGRAM REPORT: CPT

## 2020-09-26 PROCEDURE — 80053 COMPREHEN METABOLIC PANEL: CPT | Performed by: EMERGENCY MEDICINE

## 2020-09-26 PROCEDURE — 84443 ASSAY THYROID STIM HORMONE: CPT | Performed by: EMERGENCY MEDICINE

## 2020-09-26 PROCEDURE — 82550 ASSAY OF CK (CPK): CPT | Performed by: EMERGENCY MEDICINE

## 2020-09-26 PROCEDURE — 93005 ELECTROCARDIOGRAM TRACING: CPT

## 2020-09-26 PROCEDURE — 80307 DRUG TEST PRSMV CHEM ANLYZR: CPT | Performed by: EMERGENCY MEDICINE

## 2020-09-26 PROCEDURE — 99285 EMERGENCY DEPT VISIT HI MDM: CPT

## 2020-09-26 PROCEDURE — 80329 ANALGESICS NON-OPIOID 1 OR 2: CPT | Performed by: EMERGENCY MEDICINE

## 2020-09-26 PROCEDURE — G1004 CDSM NDSC: HCPCS

## 2020-09-26 PROCEDURE — 82140 ASSAY OF AMMONIA: CPT | Performed by: EMERGENCY MEDICINE

## 2020-09-26 PROCEDURE — 99223 1ST HOSP IP/OBS HIGH 75: CPT | Performed by: PHYSICIAN ASSISTANT

## 2020-09-26 PROCEDURE — 36415 COLL VENOUS BLD VENIPUNCTURE: CPT | Performed by: EMERGENCY MEDICINE

## 2020-09-26 PROCEDURE — 96365 THER/PROPH/DIAG IV INF INIT: CPT

## 2020-09-26 RX ORDER — LORAZEPAM 1 MG/1
1 TABLET ORAL EVERY 6 HOURS PRN
Status: DISCONTINUED | OUTPATIENT
Start: 2020-09-26 | End: 2020-09-26 | Stop reason: HOSPADM

## 2020-09-26 RX ORDER — BENZTROPINE MESYLATE 1 MG/1
1 TABLET ORAL 2 TIMES DAILY PRN
Status: DISCONTINUED | OUTPATIENT
Start: 2020-09-26 | End: 2020-09-26 | Stop reason: HOSPADM

## 2020-09-26 RX ORDER — NICOTINE 21 MG/24HR
1 PATCH, TRANSDERMAL 24 HOURS TRANSDERMAL DAILY
Status: DISCONTINUED | OUTPATIENT
Start: 2020-09-26 | End: 2020-09-26 | Stop reason: HOSPADM

## 2020-09-26 RX ORDER — SUCRALFATE 1 G/1
1 TABLET ORAL
Status: DISCONTINUED | OUTPATIENT
Start: 2020-09-26 | End: 2020-09-29 | Stop reason: HOSPADM

## 2020-09-26 RX ORDER — NICOTINE 21 MG/24HR
1 PATCH, TRANSDERMAL 24 HOURS TRANSDERMAL DAILY
Status: DISCONTINUED | OUTPATIENT
Start: 2020-09-27 | End: 2020-09-29 | Stop reason: HOSPADM

## 2020-09-26 RX ORDER — BENZTROPINE MESYLATE 1 MG/1
1 TABLET ORAL 2 TIMES DAILY PRN
Status: DISCONTINUED | OUTPATIENT
Start: 2020-09-26 | End: 2020-09-29 | Stop reason: HOSPADM

## 2020-09-26 RX ORDER — TRAZODONE HYDROCHLORIDE 100 MG/1
100 TABLET ORAL
Status: DISCONTINUED | OUTPATIENT
Start: 2020-09-26 | End: 2020-09-29 | Stop reason: HOSPADM

## 2020-09-26 RX ORDER — ACETAMINOPHEN 325 MG/1
650 TABLET ORAL EVERY 6 HOURS PRN
Status: DISCONTINUED | OUTPATIENT
Start: 2020-09-26 | End: 2020-09-29 | Stop reason: HOSPADM

## 2020-09-26 RX ORDER — LORAZEPAM 0.5 MG/1
0.5 TABLET ORAL EVERY 6 HOURS PRN
Status: DISCONTINUED | OUTPATIENT
Start: 2020-09-26 | End: 2020-09-29 | Stop reason: HOSPADM

## 2020-09-26 RX ORDER — OLANZAPINE 2.5 MG/1
2.5 TABLET ORAL
Status: DISCONTINUED | OUTPATIENT
Start: 2020-09-26 | End: 2020-09-26 | Stop reason: HOSPADM

## 2020-09-26 RX ORDER — BUSPIRONE HYDROCHLORIDE 10 MG/1
20 TABLET ORAL 3 TIMES DAILY
Status: DISCONTINUED | OUTPATIENT
Start: 2020-09-26 | End: 2020-09-28

## 2020-09-26 RX ORDER — LORAZEPAM 2 MG/ML
2 INJECTION INTRAMUSCULAR EVERY 6 HOURS PRN
Status: DISCONTINUED | OUTPATIENT
Start: 2020-09-26 | End: 2020-09-26 | Stop reason: HOSPADM

## 2020-09-26 RX ORDER — POTASSIUM CHLORIDE 14.9 MG/ML
20 INJECTION INTRAVENOUS ONCE
Status: COMPLETED | OUTPATIENT
Start: 2020-09-26 | End: 2020-09-26

## 2020-09-26 RX ORDER — OLANZAPINE 10 MG/1
10 INJECTION, POWDER, LYOPHILIZED, FOR SOLUTION INTRAMUSCULAR
Status: DISCONTINUED | OUTPATIENT
Start: 2020-09-26 | End: 2020-09-26 | Stop reason: HOSPADM

## 2020-09-26 RX ORDER — MAGNESIUM HYDROXIDE/ALUMINUM HYDROXICE/SIMETHICONE 120; 1200; 1200 MG/30ML; MG/30ML; MG/30ML
30 SUSPENSION ORAL EVERY 4 HOURS PRN
Status: DISCONTINUED | OUTPATIENT
Start: 2020-09-26 | End: 2020-09-29 | Stop reason: HOSPADM

## 2020-09-26 RX ORDER — POTASSIUM CHLORIDE 20 MEQ/1
20 TABLET, EXTENDED RELEASE ORAL ONCE
Status: COMPLETED | OUTPATIENT
Start: 2020-09-26 | End: 2020-09-26

## 2020-09-26 RX ORDER — OLANZAPINE 10 MG/1
5 INJECTION, POWDER, LYOPHILIZED, FOR SOLUTION INTRAMUSCULAR
Status: DISCONTINUED | OUTPATIENT
Start: 2020-09-26 | End: 2020-09-26 | Stop reason: HOSPADM

## 2020-09-26 RX ORDER — MAGNESIUM HYDROXIDE/ALUMINUM HYDROXICE/SIMETHICONE 120; 1200; 1200 MG/30ML; MG/30ML; MG/30ML
30 SUSPENSION ORAL EVERY 4 HOURS PRN
Status: DISCONTINUED | OUTPATIENT
Start: 2020-09-26 | End: 2020-09-26 | Stop reason: HOSPADM

## 2020-09-26 RX ORDER — PANTOPRAZOLE SODIUM 20 MG/1
20 TABLET, DELAYED RELEASE ORAL
Status: DISCONTINUED | OUTPATIENT
Start: 2020-09-27 | End: 2020-09-29 | Stop reason: HOSPADM

## 2020-09-26 RX ORDER — CLONIDINE HYDROCHLORIDE 0.1 MG/1
0.1 TABLET ORAL
Status: DISCONTINUED | OUTPATIENT
Start: 2020-09-26 | End: 2020-09-29 | Stop reason: HOSPADM

## 2020-09-26 RX ORDER — LORAZEPAM 0.5 MG/1
0.5 TABLET ORAL EVERY 6 HOURS PRN
Status: DISCONTINUED | OUTPATIENT
Start: 2020-09-26 | End: 2020-09-26 | Stop reason: HOSPADM

## 2020-09-26 RX ORDER — LORAZEPAM 1 MG/1
1 TABLET ORAL EVERY 6 HOURS PRN
Status: DISCONTINUED | OUTPATIENT
Start: 2020-09-26 | End: 2020-09-29 | Stop reason: HOSPADM

## 2020-09-26 RX ORDER — LORAZEPAM 2 MG/ML
2 INJECTION INTRAMUSCULAR EVERY 6 HOURS PRN
Status: DISCONTINUED | OUTPATIENT
Start: 2020-09-26 | End: 2020-09-29 | Stop reason: HOSPADM

## 2020-09-26 RX ORDER — ACETAMINOPHEN 325 MG/1
650 TABLET ORAL EVERY 6 HOURS PRN
Status: DISCONTINUED | OUTPATIENT
Start: 2020-09-26 | End: 2020-09-26 | Stop reason: HOSPADM

## 2020-09-26 RX ORDER — AMLODIPINE BESYLATE 5 MG/1
5 TABLET ORAL
Status: DISCONTINUED | OUTPATIENT
Start: 2020-09-27 | End: 2020-09-29 | Stop reason: HOSPADM

## 2020-09-26 RX ORDER — TRAZODONE HYDROCHLORIDE 50 MG/1
50 TABLET ORAL
Status: DISCONTINUED | OUTPATIENT
Start: 2020-09-26 | End: 2020-09-26 | Stop reason: HOSPADM

## 2020-09-26 RX ORDER — HYDROXYZINE HYDROCHLORIDE 25 MG/1
50 TABLET, FILM COATED ORAL 2 TIMES DAILY PRN
Status: DISCONTINUED | OUTPATIENT
Start: 2020-09-26 | End: 2020-09-29 | Stop reason: HOSPADM

## 2020-09-26 RX ADMIN — LORAZEPAM 1 MG: 1 TABLET ORAL at 00:25

## 2020-09-26 RX ADMIN — HYDROXYZINE HYDROCHLORIDE 50 MG: 25 TABLET, FILM COATED ORAL at 16:43

## 2020-09-26 RX ADMIN — NICOTINE 1 PATCH: 21 PATCH, EXTENDED RELEASE TRANSDERMAL at 09:41

## 2020-09-26 RX ADMIN — SODIUM CHLORIDE 1000 ML: 0.9 INJECTION, SOLUTION INTRAVENOUS at 11:39

## 2020-09-26 RX ADMIN — BUSPIRONE HYDROCHLORIDE 20 MG: 10 TABLET ORAL at 16:43

## 2020-09-26 RX ADMIN — OLANZAPINE 2.5 MG: 2.5 TABLET, FILM COATED ORAL at 00:25

## 2020-09-26 RX ADMIN — POTASSIUM CHLORIDE 20 MEQ: 200 INJECTION, SOLUTION INTRAVENOUS at 12:45

## 2020-09-26 RX ADMIN — TRAZODONE HYDROCHLORIDE 50 MG: 50 TABLET ORAL at 00:25

## 2020-09-26 RX ADMIN — BUSPIRONE HYDROCHLORIDE 20 MG: 10 TABLET ORAL at 21:17

## 2020-09-26 RX ADMIN — SUCRALFATE 1 G: 1 TABLET ORAL at 21:17

## 2020-09-26 RX ADMIN — INSULIN LISPRO 1 UNITS: 100 INJECTION, SOLUTION INTRAVENOUS; SUBCUTANEOUS at 16:45

## 2020-09-26 RX ADMIN — METOPROLOL TARTRATE 25 MG: 25 TABLET, FILM COATED ORAL at 21:17

## 2020-09-26 RX ADMIN — INSULIN LISPRO 1 UNITS: 100 INJECTION, SOLUTION INTRAVENOUS; SUBCUTANEOUS at 22:08

## 2020-09-26 RX ADMIN — SUCRALFATE 1 G: 1 TABLET ORAL at 16:43

## 2020-09-26 RX ADMIN — POTASSIUM CHLORIDE 20 MEQ: 1500 TABLET, EXTENDED RELEASE ORAL at 12:45

## 2020-09-26 RX ADMIN — CLONIDINE HYDROCHLORIDE 0.1 MG: 0.1 TABLET ORAL at 21:18

## 2020-09-27 LAB
ANION GAP SERPL CALCULATED.3IONS-SCNC: 3 MMOL/L (ref 4–13)
ATRIAL RATE: 99 BPM
BASOPHILS # BLD AUTO: 0.03 THOUSANDS/ΜL (ref 0–0.1)
BASOPHILS NFR BLD AUTO: 0 % (ref 0–1)
BUN SERPL-MCNC: 10 MG/DL (ref 5–25)
CALCIUM SERPL-MCNC: 9.9 MG/DL (ref 8.3–10.1)
CHLORIDE SERPL-SCNC: 106 MMOL/L (ref 100–108)
CK MB SERPL-MCNC: 1.6 NG/ML (ref 0–5)
CK MB SERPL-MCNC: <1 % (ref 0–2.5)
CK SERPL-CCNC: 176 U/L (ref 26–192)
CO2 SERPL-SCNC: 29 MMOL/L (ref 21–32)
CREAT SERPL-MCNC: 0.85 MG/DL (ref 0.6–1.3)
EOSINOPHIL # BLD AUTO: 0.17 THOUSAND/ΜL (ref 0–0.61)
EOSINOPHIL NFR BLD AUTO: 2 % (ref 0–6)
ERYTHROCYTE [DISTWIDTH] IN BLOOD BY AUTOMATED COUNT: 13.2 % (ref 11.6–15.1)
GFR SERPL CREATININE-BSD FRML MDRD: 79 ML/MIN/1.73SQ M
GLUCOSE SERPL-MCNC: 136 MG/DL (ref 65–140)
GLUCOSE SERPL-MCNC: 150 MG/DL (ref 65–140)
GLUCOSE SERPL-MCNC: 157 MG/DL (ref 65–140)
GLUCOSE SERPL-MCNC: 177 MG/DL (ref 65–140)
GLUCOSE SERPL-MCNC: 335 MG/DL (ref 65–140)
HCT VFR BLD AUTO: 35.1 % (ref 34.8–46.1)
HGB BLD-MCNC: 11.8 G/DL (ref 11.5–15.4)
IMM GRANULOCYTES # BLD AUTO: 0.03 THOUSAND/UL (ref 0–0.2)
IMM GRANULOCYTES NFR BLD AUTO: 0 % (ref 0–2)
LITHIUM SERPL-SCNC: 1.1 MMOL/L (ref 0.5–1)
LYMPHOCYTES # BLD AUTO: 2.77 THOUSANDS/ΜL (ref 0.6–4.47)
LYMPHOCYTES NFR BLD AUTO: 25 % (ref 14–44)
MCH RBC QN AUTO: 31.9 PG (ref 26.8–34.3)
MCHC RBC AUTO-ENTMCNC: 33.6 G/DL (ref 31.4–37.4)
MCV RBC AUTO: 95 FL (ref 82–98)
MONOCYTES # BLD AUTO: 1.07 THOUSAND/ΜL (ref 0.17–1.22)
MONOCYTES NFR BLD AUTO: 10 % (ref 4–12)
NEUTROPHILS # BLD AUTO: 7.22 THOUSANDS/ΜL (ref 1.85–7.62)
NEUTS SEG NFR BLD AUTO: 63 % (ref 43–75)
NRBC BLD AUTO-RTO: 0 /100 WBCS
P AXIS: 61 DEGREES
PLATELET # BLD AUTO: 279 THOUSANDS/UL (ref 149–390)
PMV BLD AUTO: 10.5 FL (ref 8.9–12.7)
POTASSIUM SERPL-SCNC: 3.2 MMOL/L (ref 3.5–5.3)
PR INTERVAL: 188 MS
QRS AXIS: 52 DEGREES
QRSD INTERVAL: 80 MS
QT INTERVAL: 334 MS
QTC INTERVAL: 428 MS
RBC # BLD AUTO: 3.7 MILLION/UL (ref 3.81–5.12)
SODIUM SERPL-SCNC: 138 MMOL/L (ref 136–145)
T WAVE AXIS: 25 DEGREES
VENTRICULAR RATE: 99 BPM
WBC # BLD AUTO: 11.29 THOUSAND/UL (ref 4.31–10.16)

## 2020-09-27 PROCEDURE — 85025 COMPLETE CBC W/AUTO DIFF WBC: CPT | Performed by: PHYSICIAN ASSISTANT

## 2020-09-27 PROCEDURE — 93010 ELECTROCARDIOGRAM REPORT: CPT | Performed by: INTERNAL MEDICINE

## 2020-09-27 PROCEDURE — 82948 REAGENT STRIP/BLOOD GLUCOSE: CPT

## 2020-09-27 PROCEDURE — 82550 ASSAY OF CK (CPK): CPT | Performed by: PHYSICIAN ASSISTANT

## 2020-09-27 PROCEDURE — 99232 SBSQ HOSP IP/OBS MODERATE 35: CPT | Performed by: NURSE PRACTITIONER

## 2020-09-27 PROCEDURE — 80178 ASSAY OF LITHIUM: CPT | Performed by: PHYSICIAN ASSISTANT

## 2020-09-27 PROCEDURE — 82553 CREATINE MB FRACTION: CPT | Performed by: PHYSICIAN ASSISTANT

## 2020-09-27 PROCEDURE — 80048 BASIC METABOLIC PNL TOTAL CA: CPT | Performed by: PHYSICIAN ASSISTANT

## 2020-09-27 RX ORDER — POTASSIUM CHLORIDE 20 MEQ/1
40 TABLET, EXTENDED RELEASE ORAL 2 TIMES DAILY
Status: COMPLETED | OUTPATIENT
Start: 2020-09-27 | End: 2020-09-27

## 2020-09-27 RX ORDER — SODIUM CHLORIDE 9 MG/ML
125 INJECTION, SOLUTION INTRAVENOUS CONTINUOUS
Status: DISCONTINUED | OUTPATIENT
Start: 2020-09-27 | End: 2020-09-28

## 2020-09-27 RX ADMIN — SUCRALFATE 1 G: 1 TABLET ORAL at 21:03

## 2020-09-27 RX ADMIN — SODIUM CHLORIDE 125 ML/HR: 0.9 INJECTION, SOLUTION INTRAVENOUS at 10:40

## 2020-09-27 RX ADMIN — METOPROLOL TARTRATE 25 MG: 25 TABLET, FILM COATED ORAL at 21:03

## 2020-09-27 RX ADMIN — BUSPIRONE HYDROCHLORIDE 20 MG: 10 TABLET ORAL at 09:03

## 2020-09-27 RX ADMIN — SODIUM CHLORIDE 125 ML/HR: 0.9 INJECTION, SOLUTION INTRAVENOUS at 23:15

## 2020-09-27 RX ADMIN — LURASIDONE HYDROCHLORIDE 120 MG: 40 TABLET, FILM COATED ORAL at 09:02

## 2020-09-27 RX ADMIN — SUCRALFATE 1 G: 1 TABLET ORAL at 16:21

## 2020-09-27 RX ADMIN — BUSPIRONE HYDROCHLORIDE 20 MG: 10 TABLET ORAL at 21:03

## 2020-09-27 RX ADMIN — SUCRALFATE 1 G: 1 TABLET ORAL at 12:12

## 2020-09-27 RX ADMIN — SUCRALFATE 1 G: 1 TABLET ORAL at 09:02

## 2020-09-27 RX ADMIN — CLONIDINE HYDROCHLORIDE 0.1 MG: 0.1 TABLET ORAL at 21:03

## 2020-09-27 RX ADMIN — ACETAMINOPHEN 650 MG: 325 TABLET ORAL at 22:31

## 2020-09-27 RX ADMIN — LORAZEPAM 1 MG: 1 TABLET ORAL at 05:50

## 2020-09-27 RX ADMIN — POTASSIUM CHLORIDE 40 MEQ: 20 TABLET, EXTENDED RELEASE ORAL at 09:03

## 2020-09-27 RX ADMIN — HYDROXYZINE HYDROCHLORIDE 50 MG: 25 TABLET, FILM COATED ORAL at 16:21

## 2020-09-27 RX ADMIN — INSULIN LISPRO 1 UNITS: 100 INJECTION, SOLUTION INTRAVENOUS; SUBCUTANEOUS at 12:50

## 2020-09-27 RX ADMIN — METOPROLOL TARTRATE 25 MG: 25 TABLET, FILM COATED ORAL at 09:04

## 2020-09-27 RX ADMIN — INSULIN LISPRO 3 UNITS: 100 INJECTION, SOLUTION INTRAVENOUS; SUBCUTANEOUS at 10:41

## 2020-09-27 RX ADMIN — INSULIN LISPRO 1 UNITS: 100 INJECTION, SOLUTION INTRAVENOUS; SUBCUTANEOUS at 21:04

## 2020-09-27 RX ADMIN — LORAZEPAM 0.5 MG: 0.5 TABLET ORAL at 23:50

## 2020-09-27 RX ADMIN — PANTOPRAZOLE SODIUM 20 MG: 20 TABLET, DELAYED RELEASE ORAL at 05:44

## 2020-09-27 RX ADMIN — BUSPIRONE HYDROCHLORIDE 20 MG: 10 TABLET ORAL at 16:21

## 2020-09-27 RX ADMIN — POTASSIUM CHLORIDE 40 MEQ: 20 TABLET, EXTENDED RELEASE ORAL at 17:52

## 2020-09-27 RX ADMIN — INSULIN LISPRO 5 UNITS: 100 INJECTION, SOLUTION INTRAVENOUS; SUBCUTANEOUS at 16:26

## 2020-09-27 RX ADMIN — NICOTINE 1 PATCH: 21 PATCH, EXTENDED RELEASE TRANSDERMAL at 09:04

## 2020-09-27 NOTE — PROGRESS NOTES
Progress Note - Vini Jennings 1965, 48 y o  female MRN: [id number]    Unit/Bed#: ICU 05 Encounter: [id number]    Primary Care Provider: YONG Carvalho   Date and time admitted to hospital: 3/2/2020  4:40 PM        Generalized anxiety disorder  Assessment & Plan  · Will defer med management to psychiatry recommendations    Tobacco abuse  Assessment & Plan  · Nicoderm patch  · Smoking cessation    h/o Seizure ([facility] [address] )  Assessment & Plan  · Unclear history of siezures  · Seizure precautions  · Meds on hold due to possibility of overdose  · Will treat if ativan if seizure observed    Alcohol use disorder, moderate, dependence ([facility] [address] )  Assessment & Plan  · Patient reports she does not drink often, unable to quantify  · Has history of alcohol abuse, will reevaluate when more awake  · [facility] protocol    * Drug overdose, multiple drugs, intentional self-harm, initial encounter  Assessment & Plan  · Patient reports she took her entire bottle of metoprolol and all of the other meds in prescription bottles that were "there"  She is unable to name other meds and unable to quantify  She states she took them around 1-1:30pm on 3/2  · Other outpatient meds she is on are sucralfate, thiamine, amlodipine, clonidine, doxepin, folic acid, gabapentin, hydroxyzine, lithium, loratadine, losartan, lurasidone,pantoprazole  · EKG shows NSR with no QTc elongation  · Patient was given Na bicarb, glucagon, 2L IVF- currently on vasopressor therapy     · Will hold any further glucagon given thought that she more then likely took more amlodipine then metoprolol  · Appreciate toxicology assistance with her care  · Will order psych consult when patient more awake      Hypotension  Assessment & Plan  · Likely related to betablocker, calcium channel blocker OD   · Levophed initiated in ED for hypotension    Suicide attempt [facility])  Assessment & Plan  · Will maintain 1:1 observation for now    Bipolar I disorder, most recent episode depressed, severe without psychotic features [facility])  Assessment & Plan  ·  Drug screen is negative for illicit agents  · Not sure which drugs she may have ingested, possibly amlodipine, clonidine and metoprolol  · Will monitor closely for signs of respiratory depression, hemodynamic instability  · Will continue vasopressor therapy to maintain MAP > 65    Essential hypertension  Assessment & Plan  · Hypotensive on admission- currently on vasopressor therapy  · Antihypertensives on hold until BP has normalized    Acquired hypothyroidism  Assessment & Plan  · Synthroid is not on PTA med list  · Will check TSH    Lactic acidosis  Assessment & Plan  · Likely related to med overdose  · Does not appear infectious  · Will recheck and monitor for resolution    Suicidal ideation  Assessment & Plan  · Psych consult  · Patient has history of mulitple suicide attempts in the past  · Currently on 1:1 observation    ----------------------------------------------------------------------------------------  HPI/24hr events: Events of admission noted, sleepy but arousable, denies SOB, diiness    Disposition: Transfer to Stepdown Level 1   Code Status: Level 1 - Full Code  ---------------------------------------------------------------------------------------  SUBJECTIVE  Arousable to stimulation, speech thick, denies dizziness    Review of Systems  Review of systems was reviewed and negative unless stated above in HPI/24-hour events   ---------------------------------------------------------------------------------------  OBJECTIVE    Vitals   Vitals:    205 20 0245 20 0300 20 0315   BP: 95/65 99/65  101/56   Pulse: 72 72  72   Resp: 12   18   Temp:   (!) 97 2 °F (36 2 °C)    TempSrc:   Temporal    SpO2: 92% 93%  92%   Weight:       Height:         Temp (24hrs), Av 9 °F (36 6 °C), Min:97 2 °F (36 2 °C), Max:98 3 °F (36 8 °C)  Current: Temperature: (!) 97 2 °F (36 2 °C)        SpO2 Device: O2 Device: Nasal cannula  O2 Flow Rate (L/min): 2 L/min    Physical Exam   Constitutional: She appears well-developed  HENT:   Head: Normocephalic  Eyes: Pupils are equal, round, and reactive to light  Neck: Neck supple  Cardiovascular: Normal rate and regular rhythm  Pulmonary/Chest: Effort normal  She has rales  Abdominal: Soft  Bowel sounds are normal  She exhibits distension  There is no tenderness  Musculoskeletal: She exhibits no edema  Lymphadenopathy:     She has no cervical adenopathy  Neurological:   arousable to stimulation, garbled speech, follows simple commands   Skin: Skin is warm and dry  Psychiatric: She has a normal mood and affect  Invasive/non-invasive ventilation settings   Respiratory    Lab Data (Last 4 hours)    None         O2/Vent Data (Last 4 hours)    None                Laboratory and Diagnostics:  Results from last 7 days   Lab Units 03/03/20  0434 03/02/20  1704   WBC Thousand/uL 11 26* 12 21*   HEMOGLOBIN g/dL 12 2 13 5   HEMATOCRIT % 37 6 40 8   PLATELETS Thousands/uL 319 399*   NEUTROS PCT % 68 67   MONOS PCT % 8 7     Results from last 7 days   Lab Units 03/03/20  0434 03/02/20  1704   SODIUM mmol/L 143 137   POTASSIUM mmol/L 3 9 4 7   CHLORIDE mmol/L 107 101   CO2 mmol/L 29 25   ANION GAP mmol/L 7 11   BUN mg/dL 8 10   CREATININE mg/dL 0 89 1 14   CALCIUM mg/dL 8 5 9 5   GLUCOSE RANDOM mg/dL 159* 189*   ALT U/L  --  30   AST U/L  --  16   ALK PHOS U/L  --  100   ALBUMIN g/dL  --  3 7   TOTAL BILIRUBIN mg/dL  --  0 28               Results from last 7 days   Lab Units 03/02/20  1704   TROPONIN I ng/mL <0 02     Results from last 7 days   Lab Units 03/03/20  0435 03/02/20  1652   LACTIC ACID mmol/L 1 4 2 3*     ABG:    VBG:          Micro        EKG:   Imaging: I have personally reviewed pertinent reports     and I have personally reviewed pertinent films in PACS    Intake and Output  I/O       03/01 0701 - 03/02 0700 03/02 0701 - 03/03 0700    I V  (mL/kg)  580 8 (6 2) IV Piggyback  1000    Total Intake(mL/kg)  1580 8 (16 8)    Net  +1580 8                Height and Weights   Height: 5' 3" (160 cm)  IBW: 52 4 kg  Body mass index is 36 83 kg/m²  Weight (last 2 days)     Date/Time   Weight    03/02/20 2000   94 3 (207 89)    03/02/20 1644   93 2 (205 47)                Nutrition       Diet Orders   (From admission, onward)             Start     Ordered    03/02/20 2012  Diet Clear Liquid  Diet effective now     Question Answer Comment   Diet Type Clear Liquid    RD to adjust diet per protocol? Yes        03/02/20 2011                  Active Medications  Scheduled Meds:    Current Facility-Administered Medications:  enoxaparin 40 mg Subcutaneous Daily YONG Morillo    folic acid 1 mg Oral Daily YONG Morillo    insulin lispro 1-5 Units Subcutaneous Q6H [address] YONG Morillo    lactated ringers 100 mL/hr Intravenous Continuous YONG Morillo Last Rate: 100 mL/hr (03/02/20 1816)   multivitamin-minerals 1 tablet Oral Daily YONG Morillo    norepinephrine 1-30 mcg/min Intravenous Titrated YONG Morillo Last Rate: 2 5 mcg/min (03/03/20 0100)   thiamine 100 mg Oral Daily JORDI MorilloNP      Continuous Infusions:    lactated ringers 100 mL/hr Last Rate: 100 mL/hr (03/02/20 1816)   norepinephrine 1-30 mcg/min Last Rate: 2 5 mcg/min (03/03/20 0100)     PRN Meds:      ---------------------------------------------------------------------------------------  Advance Directive and Living Will:      Power of :    POLST:    ---------------------------------------------------------------------------------------  Care Time Delivered:         YONG Villalobos      Portions of the record may have been created with voice recognition software  Occasional wrong word or "sound a like" substitutions may have occurred due to the inherent limitations of voice recognition software    Read the chart carefully and recognize, using context, where substitutions have occurred resident

## 2020-09-28 PROBLEM — G92.9 TOXIC ENCEPHALOPATHY: Status: ACTIVE | Noted: 2020-09-28

## 2020-09-28 LAB
ANION GAP SERPL CALCULATED.3IONS-SCNC: 7 MMOL/L (ref 4–13)
ATRIAL RATE: 102 BPM
BUN SERPL-MCNC: 8 MG/DL (ref 5–25)
CALCIUM SERPL-MCNC: 9.3 MG/DL (ref 8.3–10.1)
CHLORIDE SERPL-SCNC: 110 MMOL/L (ref 100–108)
CO2 SERPL-SCNC: 24 MMOL/L (ref 21–32)
CREAT SERPL-MCNC: 0.65 MG/DL (ref 0.6–1.3)
GFR SERPL CREATININE-BSD FRML MDRD: 102 ML/MIN/1.73SQ M
GLUCOSE SERPL-MCNC: 130 MG/DL (ref 65–140)
GLUCOSE SERPL-MCNC: 140 MG/DL (ref 65–140)
GLUCOSE SERPL-MCNC: 147 MG/DL (ref 65–140)
GLUCOSE SERPL-MCNC: 159 MG/DL (ref 65–140)
GLUCOSE SERPL-MCNC: 166 MG/DL (ref 65–140)
LITHIUM SERPL-SCNC: 0.8 MMOL/L (ref 0.5–1)
P AXIS: 75 DEGREES
POTASSIUM SERPL-SCNC: 4.2 MMOL/L (ref 3.5–5.3)
PR INTERVAL: 184 MS
QRS AXIS: 15 DEGREES
QRSD INTERVAL: 80 MS
QT INTERVAL: 524 MS
QTC INTERVAL: 682 MS
SODIUM SERPL-SCNC: 141 MMOL/L (ref 136–145)
T WAVE AXIS: 29 DEGREES
VENTRICULAR RATE: 102 BPM

## 2020-09-28 PROCEDURE — 80178 ASSAY OF LITHIUM: CPT | Performed by: NURSE PRACTITIONER

## 2020-09-28 PROCEDURE — 82948 REAGENT STRIP/BLOOD GLUCOSE: CPT

## 2020-09-28 PROCEDURE — 80048 BASIC METABOLIC PNL TOTAL CA: CPT | Performed by: NURSE PRACTITIONER

## 2020-09-28 PROCEDURE — 93010 ELECTROCARDIOGRAM REPORT: CPT | Performed by: INTERNAL MEDICINE

## 2020-09-28 PROCEDURE — 99232 SBSQ HOSP IP/OBS MODERATE 35: CPT | Performed by: PHYSICIAN ASSISTANT

## 2020-09-28 PROCEDURE — 99221 1ST HOSP IP/OBS SF/LOW 40: CPT | Performed by: PHYSICIAN ASSISTANT

## 2020-09-28 RX ORDER — BUSPIRONE HYDROCHLORIDE 10 MG/1
10 TABLET ORAL 3 TIMES DAILY
Status: DISCONTINUED | OUTPATIENT
Start: 2020-09-28 | End: 2020-09-29 | Stop reason: HOSPADM

## 2020-09-28 RX ADMIN — AMLODIPINE BESYLATE 5 MG: 5 TABLET ORAL at 12:42

## 2020-09-28 RX ADMIN — SUCRALFATE 1 G: 1 TABLET ORAL at 12:42

## 2020-09-28 RX ADMIN — HYDROXYZINE HYDROCHLORIDE 50 MG: 25 TABLET, FILM COATED ORAL at 18:48

## 2020-09-28 RX ADMIN — NICOTINE 1 PATCH: 21 PATCH, EXTENDED RELEASE TRANSDERMAL at 09:34

## 2020-09-28 RX ADMIN — CLONIDINE HYDROCHLORIDE 0.1 MG: 0.1 TABLET ORAL at 22:21

## 2020-09-28 RX ADMIN — SUCRALFATE 1 G: 1 TABLET ORAL at 06:23

## 2020-09-28 RX ADMIN — BUSPIRONE HYDROCHLORIDE 20 MG: 10 TABLET ORAL at 09:32

## 2020-09-28 RX ADMIN — BUSPIRONE HYDROCHLORIDE 10 MG: 10 TABLET ORAL at 15:42

## 2020-09-28 RX ADMIN — SUCRALFATE 1 G: 1 TABLET ORAL at 22:21

## 2020-09-28 RX ADMIN — METOPROLOL TARTRATE 25 MG: 25 TABLET, FILM COATED ORAL at 22:21

## 2020-09-28 RX ADMIN — INSULIN LISPRO 3 UNITS: 100 INJECTION, SOLUTION INTRAVENOUS; SUBCUTANEOUS at 09:36

## 2020-09-28 RX ADMIN — INSULIN LISPRO 1 UNITS: 100 INJECTION, SOLUTION INTRAVENOUS; SUBCUTANEOUS at 22:24

## 2020-09-28 RX ADMIN — METOPROLOL TARTRATE 25 MG: 25 TABLET, FILM COATED ORAL at 09:33

## 2020-09-28 RX ADMIN — PANTOPRAZOLE SODIUM 20 MG: 20 TABLET, DELAYED RELEASE ORAL at 06:23

## 2020-09-28 RX ADMIN — TRAZODONE HYDROCHLORIDE 100 MG: 100 TABLET ORAL at 01:06

## 2020-09-28 RX ADMIN — SODIUM CHLORIDE 125 ML/HR: 0.9 INJECTION, SOLUTION INTRAVENOUS at 11:59

## 2020-09-28 RX ADMIN — ACETAMINOPHEN 650 MG: 325 TABLET ORAL at 09:32

## 2020-09-28 RX ADMIN — TRAZODONE HYDROCHLORIDE 100 MG: 100 TABLET ORAL at 20:09

## 2020-09-28 RX ADMIN — INSULIN LISPRO 1 UNITS: 100 INJECTION, SOLUTION INTRAVENOUS; SUBCUTANEOUS at 12:43

## 2020-09-28 RX ADMIN — BUSPIRONE HYDROCHLORIDE 10 MG: 10 TABLET ORAL at 22:21

## 2020-09-28 RX ADMIN — SUCRALFATE 1 G: 1 TABLET ORAL at 15:41

## 2020-09-28 RX ADMIN — ACETAMINOPHEN 650 MG: 325 TABLET ORAL at 15:41

## 2020-09-29 ENCOUNTER — HOSPITAL ENCOUNTER (INPATIENT)
Facility: HOSPITAL | Age: 55
LOS: 13 days | Discharge: HOME | DRG: 885 | End: 2020-10-12
Attending: STUDENT IN AN ORGANIZED HEALTH CARE EDUCATION/TRAINING PROGRAM | Admitting: STUDENT IN AN ORGANIZED HEALTH CARE EDUCATION/TRAINING PROGRAM
Payer: MEDICARE

## 2020-09-29 VITALS
DIASTOLIC BLOOD PRESSURE: 83 MMHG | HEIGHT: 63 IN | SYSTOLIC BLOOD PRESSURE: 140 MMHG | HEART RATE: 72 BPM | BODY MASS INDEX: 31.89 KG/M2 | TEMPERATURE: 98.6 F | WEIGHT: 180 LBS | RESPIRATION RATE: 18 BRPM | OXYGEN SATURATION: 95 %

## 2020-09-29 DIAGNOSIS — F41.9 ANXIETY: ICD-10-CM

## 2020-09-29 DIAGNOSIS — E03.9 HYPOTHYROIDISM: ICD-10-CM

## 2020-09-29 DIAGNOSIS — K30 CHRONIC UPSET STOMACH: ICD-10-CM

## 2020-09-29 DIAGNOSIS — K21.9 GERD (GASTROESOPHAGEAL REFLUX DISEASE): ICD-10-CM

## 2020-09-29 DIAGNOSIS — E11.9 TYPE 2 DIABETES MELLITUS WITHOUT COMPLICATION, WITHOUT LONG-TERM CURRENT USE OF INSULIN (HCC): ICD-10-CM

## 2020-09-29 DIAGNOSIS — T56.891A LITHIUM TOXICITY: ICD-10-CM

## 2020-09-29 DIAGNOSIS — F43.12 POST-TRAUMATIC STRESS DISORDER, CHRONIC: Chronic | ICD-10-CM

## 2020-09-29 DIAGNOSIS — F31.4 BIPOLAR DISORDER, CURRENT EPISODE DEPRESSED, SEVERE, WITHOUT PSYCHOTIC FEATURES (HCC): ICD-10-CM

## 2020-09-29 DIAGNOSIS — I10 ESSENTIAL HYPERTENSION: ICD-10-CM

## 2020-09-29 DIAGNOSIS — G25.9 EXTRAPYRAMIDAL AND MOVEMENT DISORDER: ICD-10-CM

## 2020-09-29 DIAGNOSIS — H01.001 BLEPHARITIS OF RIGHT UPPER EYELID: ICD-10-CM

## 2020-09-29 DIAGNOSIS — E03.9 ACQUIRED HYPOTHYROIDISM: Primary | ICD-10-CM

## 2020-09-29 LAB
ALBUMIN SERPL BCP-MCNC: 3.3 G/DL (ref 3.5–5)
ALP SERPL-CCNC: 165 U/L (ref 46–116)
ALT SERPL W P-5'-P-CCNC: 41 U/L (ref 12–78)
ANION GAP SERPL CALCULATED.3IONS-SCNC: 9 MMOL/L (ref 4–13)
AST SERPL W P-5'-P-CCNC: 26 U/L (ref 5–45)
BASOPHILS # BLD AUTO: 0.02 THOUSANDS/ΜL (ref 0–0.1)
BASOPHILS NFR BLD AUTO: 0 % (ref 0–1)
BILIRUB SERPL-MCNC: 0.4 MG/DL (ref 0.2–1)
BUN SERPL-MCNC: 5 MG/DL (ref 5–25)
CALCIUM ALBUM COR SERPL-MCNC: 10 MG/DL (ref 8.3–10.1)
CALCIUM SERPL-MCNC: 9.4 MG/DL (ref 8.3–10.1)
CHLORIDE SERPL-SCNC: 105 MMOL/L (ref 100–108)
CO2 SERPL-SCNC: 24 MMOL/L (ref 21–32)
CREAT SERPL-MCNC: 0.65 MG/DL (ref 0.6–1.3)
EOSINOPHIL # BLD AUTO: 0.12 THOUSAND/ΜL (ref 0–0.61)
EOSINOPHIL NFR BLD AUTO: 1 % (ref 0–6)
ERYTHROCYTE [DISTWIDTH] IN BLOOD BY AUTOMATED COUNT: 12.8 % (ref 11.6–15.1)
GFR SERPL CREATININE-BSD FRML MDRD: 102 ML/MIN/1.73SQ M
GLUCOSE SERPL-MCNC: 113 MG/DL (ref 65–140)
GLUCOSE SERPL-MCNC: 134 MG/DL (ref 65–140)
GLUCOSE SERPL-MCNC: 143 MG/DL (ref 65–140)
GLUCOSE SERPL-MCNC: 156 MG/DL (ref 65–140)
GLUCOSE SERPL-MCNC: 209 MG/DL (ref 65–140)
HCT VFR BLD AUTO: 33 % (ref 34.8–46.1)
HGB BLD-MCNC: 11.4 G/DL (ref 11.5–15.4)
IMM GRANULOCYTES # BLD AUTO: 0.04 THOUSAND/UL (ref 0–0.2)
IMM GRANULOCYTES NFR BLD AUTO: 1 % (ref 0–2)
LITHIUM SERPL-SCNC: 0.4 MMOL/L (ref 0.5–1)
LYMPHOCYTES # BLD AUTO: 2.37 THOUSANDS/ΜL (ref 0.6–4.47)
LYMPHOCYTES NFR BLD AUTO: 27 % (ref 14–44)
MCH RBC QN AUTO: 32 PG (ref 26.8–34.3)
MCHC RBC AUTO-ENTMCNC: 34.5 G/DL (ref 31.4–37.4)
MCV RBC AUTO: 93 FL (ref 82–98)
MONOCYTES # BLD AUTO: 0.78 THOUSAND/ΜL (ref 0.17–1.22)
MONOCYTES NFR BLD AUTO: 9 % (ref 4–12)
NEUTROPHILS # BLD AUTO: 5.5 THOUSANDS/ΜL (ref 1.85–7.62)
NEUTS SEG NFR BLD AUTO: 62 % (ref 43–75)
NRBC BLD AUTO-RTO: 0 /100 WBCS
PLATELET # BLD AUTO: 246 THOUSANDS/UL (ref 149–390)
PMV BLD AUTO: 10.6 FL (ref 8.9–12.7)
POTASSIUM SERPL-SCNC: 3.8 MMOL/L (ref 3.5–5.3)
PROT SERPL-MCNC: 6.5 G/DL (ref 6.4–8.2)
RBC # BLD AUTO: 3.56 MILLION/UL (ref 3.81–5.12)
SODIUM SERPL-SCNC: 138 MMOL/L (ref 136–145)
T3 SERPL-MCNC: 1.2 NG/ML (ref 0.6–1.8)
T4 FREE SERPL-MCNC: 1.83 NG/DL (ref 0.76–1.46)
WBC # BLD AUTO: 8.83 THOUSAND/UL (ref 4.31–10.16)

## 2020-09-29 PROCEDURE — 82948 REAGENT STRIP/BLOOD GLUCOSE: CPT

## 2020-09-29 PROCEDURE — 80178 ASSAY OF LITHIUM: CPT | Performed by: NURSE PRACTITIONER

## 2020-09-29 PROCEDURE — 85025 COMPLETE CBC W/AUTO DIFF WBC: CPT | Performed by: PHYSICIAN ASSISTANT

## 2020-09-29 PROCEDURE — 84480 ASSAY TRIIODOTHYRONINE (T3): CPT | Performed by: PHYSICIAN ASSISTANT

## 2020-09-29 PROCEDURE — 84439 ASSAY OF FREE THYROXINE: CPT | Performed by: PHYSICIAN ASSISTANT

## 2020-09-29 PROCEDURE — 80053 COMPREHEN METABOLIC PANEL: CPT | Performed by: PHYSICIAN ASSISTANT

## 2020-09-29 PROCEDURE — 99232 SBSQ HOSP IP/OBS MODERATE 35: CPT | Performed by: INTERNAL MEDICINE

## 2020-09-29 RX ORDER — NICOTINE 21 MG/24HR
1 PATCH, TRANSDERMAL 24 HOURS TRANSDERMAL DAILY
Status: CANCELLED | OUTPATIENT
Start: 2020-09-30

## 2020-09-29 RX ORDER — ACETAMINOPHEN 325 MG/1
975 TABLET ORAL EVERY 6 HOURS PRN
Status: DISCONTINUED | OUTPATIENT
Start: 2020-09-29 | End: 2020-10-12 | Stop reason: HOSPADM

## 2020-09-29 RX ORDER — ACETAMINOPHEN 325 MG/1
325 TABLET ORAL EVERY 6 HOURS PRN
Status: DISCONTINUED | OUTPATIENT
Start: 2020-09-29 | End: 2020-10-12 | Stop reason: HOSPADM

## 2020-09-29 RX ORDER — ACETAMINOPHEN 325 MG/1
650 TABLET ORAL EVERY 4 HOURS PRN
Status: DISCONTINUED | OUTPATIENT
Start: 2020-09-29 | End: 2020-10-12 | Stop reason: HOSPADM

## 2020-09-29 RX ORDER — RISPERIDONE 1 MG/1
1 TABLET, ORALLY DISINTEGRATING ORAL
Status: CANCELLED | OUTPATIENT
Start: 2020-09-29

## 2020-09-29 RX ORDER — BENZTROPINE MESYLATE 1 MG/1
1 TABLET ORAL EVERY 6 HOURS PRN
Status: DISCONTINUED | OUTPATIENT
Start: 2020-09-29 | End: 2020-10-12 | Stop reason: HOSPADM

## 2020-09-29 RX ORDER — OLANZAPINE 10 MG/1
5 INJECTION, POWDER, LYOPHILIZED, FOR SOLUTION INTRAMUSCULAR EVERY 8 HOURS PRN
Status: DISCONTINUED | OUTPATIENT
Start: 2020-09-29 | End: 2020-10-12 | Stop reason: HOSPADM

## 2020-09-29 RX ORDER — TRAZODONE HYDROCHLORIDE 100 MG/1
100 TABLET ORAL
Status: CANCELLED | OUTPATIENT
Start: 2020-09-29

## 2020-09-29 RX ORDER — MAGNESIUM HYDROXIDE/ALUMINUM HYDROXICE/SIMETHICONE 120; 1200; 1200 MG/30ML; MG/30ML; MG/30ML
30 SUSPENSION ORAL EVERY 4 HOURS PRN
Status: CANCELLED | OUTPATIENT
Start: 2020-09-29

## 2020-09-29 RX ORDER — ACETAMINOPHEN 325 MG/1
650 TABLET ORAL EVERY 6 HOURS PRN
Status: CANCELLED | OUTPATIENT
Start: 2020-09-29

## 2020-09-29 RX ORDER — ACETAMINOPHEN 325 MG/1
650 TABLET ORAL EVERY 6 HOURS PRN
Status: DISCONTINUED | OUTPATIENT
Start: 2020-09-29 | End: 2020-09-29 | Stop reason: SDUPTHER

## 2020-09-29 RX ORDER — HALOPERIDOL 0.5 MG/1
2 TABLET ORAL EVERY 6 HOURS PRN
Status: CANCELLED | OUTPATIENT
Start: 2020-09-29

## 2020-09-29 RX ORDER — TRAZODONE HYDROCHLORIDE 100 MG/1
100 TABLET ORAL
Status: DISCONTINUED | OUTPATIENT
Start: 2020-09-29 | End: 2020-09-29 | Stop reason: SDUPTHER

## 2020-09-29 RX ORDER — LORAZEPAM 2 MG/ML
2 INJECTION INTRAMUSCULAR EVERY 6 HOURS PRN
Status: DISCONTINUED | OUTPATIENT
Start: 2020-09-29 | End: 2020-10-12 | Stop reason: HOSPADM

## 2020-09-29 RX ORDER — LORAZEPAM 1 MG/1
1 TABLET ORAL EVERY 6 HOURS PRN
Status: DISCONTINUED | OUTPATIENT
Start: 2020-09-29 | End: 2020-10-12 | Stop reason: HOSPADM

## 2020-09-29 RX ORDER — TRAZODONE HYDROCHLORIDE 50 MG/1
50 TABLET ORAL
Status: CANCELLED | OUTPATIENT
Start: 2020-09-29

## 2020-09-29 RX ORDER — HALOPERIDOL 2 MG/1
2 TABLET ORAL EVERY 6 HOURS PRN
Status: DISCONTINUED | OUTPATIENT
Start: 2020-09-29 | End: 2020-10-12 | Stop reason: HOSPADM

## 2020-09-29 RX ORDER — MAGNESIUM HYDROXIDE/ALUMINUM HYDROXICE/SIMETHICONE 120; 1200; 1200 MG/30ML; MG/30ML; MG/30ML
30 SUSPENSION ORAL EVERY 4 HOURS PRN
Status: DISCONTINUED | OUTPATIENT
Start: 2020-09-29 | End: 2020-10-12 | Stop reason: HOSPADM

## 2020-09-29 RX ORDER — ACETAMINOPHEN 325 MG/1
650 TABLET ORAL EVERY 4 HOURS PRN
Status: CANCELLED | OUTPATIENT
Start: 2020-09-29

## 2020-09-29 RX ORDER — RISPERIDONE 1 MG/1
1 TABLET, ORALLY DISINTEGRATING ORAL
Status: DISCONTINUED | OUTPATIENT
Start: 2020-09-29 | End: 2020-10-12 | Stop reason: HOSPADM

## 2020-09-29 RX ORDER — OLANZAPINE 5 MG/1
5 TABLET ORAL EVERY 8 HOURS PRN
Status: CANCELLED | OUTPATIENT
Start: 2020-09-29

## 2020-09-29 RX ORDER — BENZTROPINE MESYLATE 1 MG/1
1 TABLET ORAL 2 TIMES DAILY PRN
Status: DISCONTINUED | OUTPATIENT
Start: 2020-09-29 | End: 2020-10-10

## 2020-09-29 RX ORDER — ACETAMINOPHEN 325 MG/1
975 TABLET ORAL EVERY 6 HOURS PRN
Status: CANCELLED | OUTPATIENT
Start: 2020-09-29

## 2020-09-29 RX ORDER — HALOPERIDOL 5 MG/ML
2 INJECTION INTRAMUSCULAR EVERY 6 HOURS PRN
Status: DISCONTINUED | OUTPATIENT
Start: 2020-09-29 | End: 2020-10-12 | Stop reason: HOSPADM

## 2020-09-29 RX ORDER — BENZTROPINE MESYLATE 1 MG/ML
1 INJECTION INTRAMUSCULAR; INTRAVENOUS EVERY 6 HOURS PRN
Status: CANCELLED | OUTPATIENT
Start: 2020-09-29

## 2020-09-29 RX ORDER — LORAZEPAM 1 MG/1
1 TABLET ORAL EVERY 6 HOURS PRN
Status: CANCELLED | OUTPATIENT
Start: 2020-09-29

## 2020-09-29 RX ORDER — TRAZODONE HYDROCHLORIDE 50 MG/1
50 TABLET ORAL
Status: DISCONTINUED | OUTPATIENT
Start: 2020-09-29 | End: 2020-09-30

## 2020-09-29 RX ORDER — SUCRALFATE 1 G/1
1 TABLET ORAL
Status: DISCONTINUED | OUTPATIENT
Start: 2020-09-29 | End: 2020-10-12 | Stop reason: HOSPADM

## 2020-09-29 RX ORDER — OLANZAPINE 10 MG/1
5 INJECTION, POWDER, LYOPHILIZED, FOR SOLUTION INTRAMUSCULAR EVERY 8 HOURS PRN
Status: CANCELLED | OUTPATIENT
Start: 2020-09-29

## 2020-09-29 RX ORDER — PANTOPRAZOLE SODIUM 20 MG/1
20 TABLET, DELAYED RELEASE ORAL
Status: DISCONTINUED | OUTPATIENT
Start: 2020-09-30 | End: 2020-10-12 | Stop reason: HOSPADM

## 2020-09-29 RX ORDER — AMLODIPINE BESYLATE 5 MG/1
5 TABLET ORAL
Status: DISCONTINUED | OUTPATIENT
Start: 2020-09-30 | End: 2020-10-12 | Stop reason: HOSPADM

## 2020-09-29 RX ORDER — LORAZEPAM 2 MG/ML
2 INJECTION INTRAMUSCULAR EVERY 6 HOURS PRN
Status: CANCELLED | OUTPATIENT
Start: 2020-09-29

## 2020-09-29 RX ORDER — LORAZEPAM 0.5 MG/1
0.5 TABLET ORAL EVERY 6 HOURS PRN
Status: DISCONTINUED | OUTPATIENT
Start: 2020-09-29 | End: 2020-10-12 | Stop reason: HOSPADM

## 2020-09-29 RX ORDER — CLONIDINE HYDROCHLORIDE 0.1 MG/1
0.1 TABLET ORAL
Status: CANCELLED | OUTPATIENT
Start: 2020-09-29

## 2020-09-29 RX ORDER — HALOPERIDOL 5 MG/ML
2 INJECTION INTRAMUSCULAR EVERY 6 HOURS PRN
Status: CANCELLED | OUTPATIENT
Start: 2020-09-29

## 2020-09-29 RX ORDER — ACETAMINOPHEN 325 MG/1
325 TABLET ORAL EVERY 6 HOURS PRN
Status: CANCELLED | OUTPATIENT
Start: 2020-09-29

## 2020-09-29 RX ORDER — BUSPIRONE HYDROCHLORIDE 10 MG/1
10 TABLET ORAL 3 TIMES DAILY
Status: DISCONTINUED | OUTPATIENT
Start: 2020-09-29 | End: 2020-10-02

## 2020-09-29 RX ORDER — SUCRALFATE 1 G/1
1 TABLET ORAL
Status: CANCELLED | OUTPATIENT
Start: 2020-09-29

## 2020-09-29 RX ORDER — AMLODIPINE BESYLATE 5 MG/1
5 TABLET ORAL
Status: CANCELLED | OUTPATIENT
Start: 2020-09-29

## 2020-09-29 RX ORDER — BENZTROPINE MESYLATE 1 MG/1
1 TABLET ORAL 2 TIMES DAILY PRN
Status: CANCELLED | OUTPATIENT
Start: 2020-09-29

## 2020-09-29 RX ORDER — BENZTROPINE MESYLATE 1 MG/ML
1 INJECTION INTRAMUSCULAR; INTRAVENOUS EVERY 6 HOURS PRN
Status: DISCONTINUED | OUTPATIENT
Start: 2020-09-29 | End: 2020-10-12 | Stop reason: HOSPADM

## 2020-09-29 RX ORDER — NICOTINE 21 MG/24HR
1 PATCH, TRANSDERMAL 24 HOURS TRANSDERMAL DAILY
Status: DISCONTINUED | OUTPATIENT
Start: 2020-09-30 | End: 2020-10-12 | Stop reason: HOSPADM

## 2020-09-29 RX ORDER — BUSPIRONE HYDROCHLORIDE 10 MG/1
10 TABLET ORAL 3 TIMES DAILY
Status: CANCELLED | OUTPATIENT
Start: 2020-09-29

## 2020-09-29 RX ORDER — BENZTROPINE MESYLATE 1 MG/1
1 TABLET ORAL EVERY 6 HOURS PRN
Status: CANCELLED | OUTPATIENT
Start: 2020-09-29

## 2020-09-29 RX ORDER — LORAZEPAM 0.5 MG/1
0.5 TABLET ORAL EVERY 6 HOURS PRN
Status: CANCELLED | OUTPATIENT
Start: 2020-09-29

## 2020-09-29 RX ORDER — CLONIDINE HYDROCHLORIDE 0.1 MG/1
0.1 TABLET ORAL
Status: DISCONTINUED | OUTPATIENT
Start: 2020-09-29 | End: 2020-10-12 | Stop reason: HOSPADM

## 2020-09-29 RX ORDER — MAGNESIUM HYDROXIDE/ALUMINUM HYDROXICE/SIMETHICONE 120; 1200; 1200 MG/30ML; MG/30ML; MG/30ML
30 SUSPENSION ORAL EVERY 4 HOURS PRN
Status: DISCONTINUED | OUTPATIENT
Start: 2020-09-29 | End: 2020-09-29 | Stop reason: SDUPTHER

## 2020-09-29 RX ORDER — PANTOPRAZOLE SODIUM 20 MG/1
20 TABLET, DELAYED RELEASE ORAL
Status: CANCELLED | OUTPATIENT
Start: 2020-09-30

## 2020-09-29 RX ORDER — OLANZAPINE 5 MG/1
5 TABLET ORAL EVERY 8 HOURS PRN
Status: DISCONTINUED | OUTPATIENT
Start: 2020-09-29 | End: 2020-10-12 | Stop reason: HOSPADM

## 2020-09-29 RX ADMIN — SUCRALFATE 1 G: 1 TABLET ORAL at 11:48

## 2020-09-29 RX ADMIN — LORAZEPAM 0.5 MG: 0.5 TABLET ORAL at 06:11

## 2020-09-29 RX ADMIN — BUSPIRONE HYDROCHLORIDE 10 MG: 10 TABLET ORAL at 15:32

## 2020-09-29 RX ADMIN — BUSPIRONE HYDROCHLORIDE 10 MG: 10 TABLET ORAL at 08:43

## 2020-09-29 RX ADMIN — METOPROLOL TARTRATE 25 MG: 25 TABLET, FILM COATED ORAL at 08:43

## 2020-09-29 RX ADMIN — CLONIDINE HYDROCHLORIDE 0.1 MG: 0.1 TABLET ORAL at 21:19

## 2020-09-29 RX ADMIN — METOPROLOL TARTRATE 25 MG: 25 TABLET, FILM COATED ORAL at 21:19

## 2020-09-29 RX ADMIN — BUSPIRONE HYDROCHLORIDE 10 MG: 10 TABLET ORAL at 21:19

## 2020-09-29 RX ADMIN — PANTOPRAZOLE SODIUM 20 MG: 20 TABLET, DELAYED RELEASE ORAL at 06:12

## 2020-09-29 RX ADMIN — TRAZODONE HYDROCHLORIDE 50 MG: 50 TABLET ORAL at 21:19

## 2020-09-29 RX ADMIN — INSULIN LISPRO 2 UNITS: 100 INJECTION, SOLUTION INTRAVENOUS; SUBCUTANEOUS at 17:20

## 2020-09-29 RX ADMIN — SUCRALFATE 1 G: 1 TABLET ORAL at 06:12

## 2020-09-29 RX ADMIN — AMLODIPINE BESYLATE 5 MG: 5 TABLET ORAL at 11:48

## 2020-09-29 RX ADMIN — SUCRALFATE 1 G: 1 TABLET ORAL at 21:19

## 2020-09-29 RX ADMIN — INSULIN LISPRO 3 UNITS: 100 INJECTION, SOLUTION INTRAVENOUS; SUBCUTANEOUS at 08:45

## 2020-09-29 RX ADMIN — SUCRALFATE 1 G: 1 TABLET ORAL at 15:32

## 2020-09-29 RX ADMIN — LORAZEPAM 0.5 MG: 0.5 TABLET ORAL at 23:40

## 2020-09-29 RX ADMIN — INSULIN LISPRO 1 UNITS: 100 INJECTION, SOLUTION INTRAVENOUS; SUBCUTANEOUS at 08:44

## 2020-09-29 RX ADMIN — NICOTINE 1 PATCH: 21 PATCH, EXTENDED RELEASE TRANSDERMAL at 08:44

## 2020-09-29 RX ADMIN — LORAZEPAM 0.5 MG: 0.5 TABLET ORAL at 15:32

## 2020-09-30 PROBLEM — K21.9 GASTROESOPHAGEAL REFLUX DISEASE WITHOUT ESOPHAGITIS: Status: ACTIVE | Noted: 2020-09-30

## 2020-09-30 LAB
GLUCOSE SERPL-MCNC: 131 MG/DL (ref 65–140)
GLUCOSE SERPL-MCNC: 144 MG/DL (ref 65–140)
GLUCOSE SERPL-MCNC: 156 MG/DL (ref 65–140)
GLUCOSE SERPL-MCNC: 181 MG/DL (ref 65–140)

## 2020-09-30 PROCEDURE — 99222 1ST HOSP IP/OBS MODERATE 55: CPT | Performed by: PHYSICIAN ASSISTANT

## 2020-09-30 PROCEDURE — 99232 SBSQ HOSP IP/OBS MODERATE 35: CPT | Performed by: STUDENT IN AN ORGANIZED HEALTH CARE EDUCATION/TRAINING PROGRAM

## 2020-09-30 PROCEDURE — 82948 REAGENT STRIP/BLOOD GLUCOSE: CPT

## 2020-09-30 PROCEDURE — 99222 1ST HOSP IP/OBS MODERATE 55: CPT | Performed by: INTERNAL MEDICINE

## 2020-09-30 RX ORDER — ARIPIPRAZOLE 5 MG/1
5 TABLET ORAL DAILY
Status: DISCONTINUED | OUTPATIENT
Start: 2020-09-30 | End: 2020-10-05

## 2020-09-30 RX ORDER — GABAPENTIN 100 MG/1
100 CAPSULE ORAL 3 TIMES DAILY
Status: DISCONTINUED | OUTPATIENT
Start: 2020-09-30 | End: 2020-10-02

## 2020-09-30 RX ORDER — TRAZODONE HYDROCHLORIDE 50 MG/1
50 TABLET ORAL
Status: DISCONTINUED | OUTPATIENT
Start: 2020-09-30 | End: 2020-10-02

## 2020-09-30 RX ADMIN — GABAPENTIN 100 MG: 100 CAPSULE ORAL at 21:26

## 2020-09-30 RX ADMIN — INSULIN LISPRO 1 UNITS: 100 INJECTION, SOLUTION INTRAVENOUS; SUBCUTANEOUS at 12:11

## 2020-09-30 RX ADMIN — GABAPENTIN 100 MG: 100 CAPSULE ORAL at 12:07

## 2020-09-30 RX ADMIN — METOPROLOL TARTRATE 25 MG: 25 TABLET, FILM COATED ORAL at 21:26

## 2020-09-30 RX ADMIN — BUSPIRONE HYDROCHLORIDE 10 MG: 10 TABLET ORAL at 16:05

## 2020-09-30 RX ADMIN — BUSPIRONE HYDROCHLORIDE 10 MG: 10 TABLET ORAL at 21:27

## 2020-09-30 RX ADMIN — AMLODIPINE BESYLATE 5 MG: 5 TABLET ORAL at 12:07

## 2020-09-30 RX ADMIN — SUCRALFATE 1 G: 1 TABLET ORAL at 16:05

## 2020-09-30 RX ADMIN — NICOTINE 1 PATCH: 21 PATCH, EXTENDED RELEASE TRANSDERMAL at 09:22

## 2020-09-30 RX ADMIN — PANTOPRAZOLE SODIUM 20 MG: 20 TABLET, DELAYED RELEASE ORAL at 06:42

## 2020-09-30 RX ADMIN — INSULIN LISPRO 1 UNITS: 100 INJECTION, SOLUTION INTRAVENOUS; SUBCUTANEOUS at 17:41

## 2020-09-30 RX ADMIN — CLONIDINE HYDROCHLORIDE 0.1 MG: 0.1 TABLET ORAL at 21:26

## 2020-09-30 RX ADMIN — BUSPIRONE HYDROCHLORIDE 10 MG: 10 TABLET ORAL at 09:21

## 2020-09-30 RX ADMIN — INSULIN LISPRO 3 UNITS: 100 INJECTION, SOLUTION INTRAVENOUS; SUBCUTANEOUS at 09:19

## 2020-09-30 RX ADMIN — GABAPENTIN 100 MG: 100 CAPSULE ORAL at 16:05

## 2020-09-30 RX ADMIN — SUCRALFATE 1 G: 1 TABLET ORAL at 21:27

## 2020-09-30 RX ADMIN — SUCRALFATE 1 G: 1 TABLET ORAL at 12:07

## 2020-09-30 RX ADMIN — TRAZODONE HYDROCHLORIDE 50 MG: 50 TABLET ORAL at 21:26

## 2020-09-30 RX ADMIN — ARIPIPRAZOLE 5 MG: 5 TABLET ORAL at 12:07

## 2020-09-30 RX ADMIN — SUCRALFATE 1 G: 1 TABLET ORAL at 06:42

## 2020-09-30 RX ADMIN — METOPROLOL TARTRATE 25 MG: 25 TABLET, FILM COATED ORAL at 09:20

## 2020-10-01 LAB
GLUCOSE SERPL-MCNC: 114 MG/DL (ref 65–140)
GLUCOSE SERPL-MCNC: 141 MG/DL (ref 65–140)
GLUCOSE SERPL-MCNC: 143 MG/DL (ref 65–140)
GLUCOSE SERPL-MCNC: 179 MG/DL (ref 65–140)

## 2020-10-01 PROCEDURE — 82948 REAGENT STRIP/BLOOD GLUCOSE: CPT

## 2020-10-01 PROCEDURE — 99232 SBSQ HOSP IP/OBS MODERATE 35: CPT | Performed by: PHYSICIAN ASSISTANT

## 2020-10-01 RX ADMIN — TRAZODONE HYDROCHLORIDE 50 MG: 50 TABLET ORAL at 21:26

## 2020-10-01 RX ADMIN — METOPROLOL TARTRATE 25 MG: 25 TABLET, FILM COATED ORAL at 21:26

## 2020-10-01 RX ADMIN — METOPROLOL TARTRATE 25 MG: 25 TABLET, FILM COATED ORAL at 09:41

## 2020-10-01 RX ADMIN — GABAPENTIN 100 MG: 100 CAPSULE ORAL at 16:12

## 2020-10-01 RX ADMIN — BUSPIRONE HYDROCHLORIDE 10 MG: 10 TABLET ORAL at 21:26

## 2020-10-01 RX ADMIN — GABAPENTIN 100 MG: 100 CAPSULE ORAL at 21:27

## 2020-10-01 RX ADMIN — SUCRALFATE 1 G: 1 TABLET ORAL at 21:27

## 2020-10-01 RX ADMIN — SUCRALFATE 1 G: 1 TABLET ORAL at 12:28

## 2020-10-01 RX ADMIN — LORAZEPAM 0.5 MG: 0.5 TABLET ORAL at 22:43

## 2020-10-01 RX ADMIN — AMLODIPINE BESYLATE 5 MG: 5 TABLET ORAL at 12:28

## 2020-10-01 RX ADMIN — NICOTINE 1 PATCH: 21 PATCH, EXTENDED RELEASE TRANSDERMAL at 09:43

## 2020-10-01 RX ADMIN — SUCRALFATE 1 G: 1 TABLET ORAL at 16:11

## 2020-10-01 RX ADMIN — PANTOPRAZOLE SODIUM 20 MG: 20 TABLET, DELAYED RELEASE ORAL at 06:39

## 2020-10-01 RX ADMIN — ARIPIPRAZOLE 5 MG: 5 TABLET ORAL at 09:42

## 2020-10-01 RX ADMIN — GABAPENTIN 100 MG: 100 CAPSULE ORAL at 09:41

## 2020-10-01 RX ADMIN — CLONIDINE HYDROCHLORIDE 0.1 MG: 0.1 TABLET ORAL at 21:26

## 2020-10-01 RX ADMIN — INSULIN LISPRO 3 UNITS: 100 INJECTION, SOLUTION INTRAVENOUS; SUBCUTANEOUS at 09:46

## 2020-10-01 RX ADMIN — BUSPIRONE HYDROCHLORIDE 10 MG: 10 TABLET ORAL at 09:42

## 2020-10-01 RX ADMIN — BUSPIRONE HYDROCHLORIDE 10 MG: 10 TABLET ORAL at 16:11

## 2020-10-01 RX ADMIN — SUCRALFATE 1 G: 1 TABLET ORAL at 06:39

## 2020-10-02 LAB
GLUCOSE SERPL-MCNC: 108 MG/DL (ref 65–140)
GLUCOSE SERPL-MCNC: 141 MG/DL (ref 65–140)
GLUCOSE SERPL-MCNC: 156 MG/DL (ref 65–140)
GLUCOSE SERPL-MCNC: 172 MG/DL (ref 65–140)
T3FREE SERPL-MCNC: 2.16 PG/ML (ref 2.3–4.2)
T4 FREE SERPL-MCNC: 1.33 NG/DL (ref 0.76–1.46)
TSH SERPL DL<=0.05 MIU/L-ACNC: 0.06 UIU/ML (ref 0.36–3.74)

## 2020-10-02 PROCEDURE — 84481 FREE ASSAY (FT-3): CPT | Performed by: INTERNAL MEDICINE

## 2020-10-02 PROCEDURE — 99232 SBSQ HOSP IP/OBS MODERATE 35: CPT | Performed by: PHYSICIAN ASSISTANT

## 2020-10-02 PROCEDURE — 84439 ASSAY OF FREE THYROXINE: CPT | Performed by: INTERNAL MEDICINE

## 2020-10-02 PROCEDURE — 84443 ASSAY THYROID STIM HORMONE: CPT | Performed by: INTERNAL MEDICINE

## 2020-10-02 PROCEDURE — 82948 REAGENT STRIP/BLOOD GLUCOSE: CPT

## 2020-10-02 RX ORDER — TRAZODONE HYDROCHLORIDE 100 MG/1
100 TABLET ORAL
Status: DISCONTINUED | OUTPATIENT
Start: 2020-10-02 | End: 2020-10-03

## 2020-10-02 RX ORDER — BUSPIRONE HYDROCHLORIDE 5 MG/1
5 TABLET ORAL 3 TIMES DAILY
Status: DISCONTINUED | OUTPATIENT
Start: 2020-10-02 | End: 2020-10-06

## 2020-10-02 RX ORDER — GABAPENTIN 300 MG/1
300 CAPSULE ORAL 3 TIMES DAILY
Status: DISCONTINUED | OUTPATIENT
Start: 2020-10-02 | End: 2020-10-12 | Stop reason: HOSPADM

## 2020-10-02 RX ADMIN — PANTOPRAZOLE SODIUM 20 MG: 20 TABLET, DELAYED RELEASE ORAL at 07:29

## 2020-10-02 RX ADMIN — INSULIN LISPRO 3 UNITS: 100 INJECTION, SOLUTION INTRAVENOUS; SUBCUTANEOUS at 09:18

## 2020-10-02 RX ADMIN — SUCRALFATE 1 G: 1 TABLET ORAL at 21:41

## 2020-10-02 RX ADMIN — TRAZODONE HYDROCHLORIDE 100 MG: 100 TABLET ORAL at 21:41

## 2020-10-02 RX ADMIN — BUSPIRONE HYDROCHLORIDE 5 MG: 5 TABLET ORAL at 16:14

## 2020-10-02 RX ADMIN — GABAPENTIN 100 MG: 100 CAPSULE ORAL at 08:54

## 2020-10-02 RX ADMIN — ARIPIPRAZOLE 5 MG: 5 TABLET ORAL at 08:53

## 2020-10-02 RX ADMIN — GABAPENTIN 300 MG: 300 CAPSULE ORAL at 16:14

## 2020-10-02 RX ADMIN — SUCRALFATE 1 G: 1 TABLET ORAL at 07:29

## 2020-10-02 RX ADMIN — GABAPENTIN 300 MG: 300 CAPSULE ORAL at 21:41

## 2020-10-02 RX ADMIN — INSULIN LISPRO 1 UNITS: 100 INJECTION, SOLUTION INTRAVENOUS; SUBCUTANEOUS at 08:55

## 2020-10-02 RX ADMIN — SUCRALFATE 1 G: 1 TABLET ORAL at 16:14

## 2020-10-02 RX ADMIN — METOPROLOL TARTRATE 25 MG: 25 TABLET, FILM COATED ORAL at 08:53

## 2020-10-02 RX ADMIN — INSULIN LISPRO 1 UNITS: 100 INJECTION, SOLUTION INTRAVENOUS; SUBCUTANEOUS at 16:58

## 2020-10-02 RX ADMIN — NICOTINE 1 PATCH: 21 PATCH, EXTENDED RELEASE TRANSDERMAL at 08:52

## 2020-10-02 RX ADMIN — BUSPIRONE HYDROCHLORIDE 10 MG: 10 TABLET ORAL at 08:53

## 2020-10-02 RX ADMIN — SUCRALFATE 1 G: 1 TABLET ORAL at 12:08

## 2020-10-02 RX ADMIN — BUSPIRONE HYDROCHLORIDE 5 MG: 5 TABLET ORAL at 21:41

## 2020-10-02 RX ADMIN — AMLODIPINE BESYLATE 5 MG: 5 TABLET ORAL at 12:08

## 2020-10-02 RX ADMIN — METOPROLOL TARTRATE 25 MG: 25 TABLET, FILM COATED ORAL at 21:41

## 2020-10-02 RX ADMIN — CLONIDINE HYDROCHLORIDE 0.1 MG: 0.1 TABLET ORAL at 21:41

## 2020-10-03 LAB
GLUCOSE SERPL-MCNC: 125 MG/DL (ref 65–140)
GLUCOSE SERPL-MCNC: 137 MG/DL (ref 65–140)
GLUCOSE SERPL-MCNC: 141 MG/DL (ref 65–140)
GLUCOSE SERPL-MCNC: 163 MG/DL (ref 65–140)

## 2020-10-03 PROCEDURE — 82948 REAGENT STRIP/BLOOD GLUCOSE: CPT

## 2020-10-03 PROCEDURE — 99232 SBSQ HOSP IP/OBS MODERATE 35: CPT | Performed by: STUDENT IN AN ORGANIZED HEALTH CARE EDUCATION/TRAINING PROGRAM

## 2020-10-03 RX ORDER — HYDROXYZINE 50 MG/1
50 TABLET, FILM COATED ORAL
Status: DISCONTINUED | OUTPATIENT
Start: 2020-10-03 | End: 2020-10-09

## 2020-10-03 RX ADMIN — AMLODIPINE BESYLATE 5 MG: 5 TABLET ORAL at 11:38

## 2020-10-03 RX ADMIN — SUCRALFATE 1 G: 1 TABLET ORAL at 06:33

## 2020-10-03 RX ADMIN — ARIPIPRAZOLE 5 MG: 5 TABLET ORAL at 08:36

## 2020-10-03 RX ADMIN — METOPROLOL TARTRATE 25 MG: 25 TABLET, FILM COATED ORAL at 08:37

## 2020-10-03 RX ADMIN — HYDROXYZINE HYDROCHLORIDE 50 MG: 50 TABLET, FILM COATED ORAL at 21:36

## 2020-10-03 RX ADMIN — NICOTINE 1 PATCH: 21 PATCH, EXTENDED RELEASE TRANSDERMAL at 08:37

## 2020-10-03 RX ADMIN — SUCRALFATE 1 G: 1 TABLET ORAL at 16:16

## 2020-10-03 RX ADMIN — BUSPIRONE HYDROCHLORIDE 5 MG: 5 TABLET ORAL at 21:36

## 2020-10-03 RX ADMIN — GABAPENTIN 300 MG: 300 CAPSULE ORAL at 16:16

## 2020-10-03 RX ADMIN — INSULIN LISPRO 1 UNITS: 100 INJECTION, SOLUTION INTRAVENOUS; SUBCUTANEOUS at 12:11

## 2020-10-03 RX ADMIN — PANTOPRAZOLE SODIUM 20 MG: 20 TABLET, DELAYED RELEASE ORAL at 06:33

## 2020-10-03 RX ADMIN — SUCRALFATE 1 G: 1 TABLET ORAL at 21:36

## 2020-10-03 RX ADMIN — BUSPIRONE HYDROCHLORIDE 5 MG: 5 TABLET ORAL at 08:37

## 2020-10-03 RX ADMIN — GABAPENTIN 300 MG: 300 CAPSULE ORAL at 21:36

## 2020-10-03 RX ADMIN — GABAPENTIN 300 MG: 300 CAPSULE ORAL at 08:36

## 2020-10-03 RX ADMIN — INSULIN LISPRO 3 UNITS: 100 INJECTION, SOLUTION INTRAVENOUS; SUBCUTANEOUS at 09:06

## 2020-10-03 RX ADMIN — CLONIDINE HYDROCHLORIDE 0.1 MG: 0.1 TABLET ORAL at 21:36

## 2020-10-03 RX ADMIN — METOPROLOL TARTRATE 25 MG: 25 TABLET, FILM COATED ORAL at 21:36

## 2020-10-03 RX ADMIN — SUCRALFATE 1 G: 1 TABLET ORAL at 11:38

## 2020-10-03 RX ADMIN — BUSPIRONE HYDROCHLORIDE 5 MG: 5 TABLET ORAL at 16:16

## 2020-10-04 LAB
GLUCOSE SERPL-MCNC: 115 MG/DL (ref 65–140)
GLUCOSE SERPL-MCNC: 121 MG/DL (ref 65–140)
GLUCOSE SERPL-MCNC: 135 MG/DL (ref 65–140)
GLUCOSE SERPL-MCNC: 146 MG/DL (ref 65–140)

## 2020-10-04 PROCEDURE — 82948 REAGENT STRIP/BLOOD GLUCOSE: CPT

## 2020-10-04 PROCEDURE — 99232 SBSQ HOSP IP/OBS MODERATE 35: CPT | Performed by: STUDENT IN AN ORGANIZED HEALTH CARE EDUCATION/TRAINING PROGRAM

## 2020-10-04 RX ADMIN — SUCRALFATE 1 G: 1 TABLET ORAL at 16:32

## 2020-10-04 RX ADMIN — SUCRALFATE 1 G: 1 TABLET ORAL at 07:28

## 2020-10-04 RX ADMIN — METOPROLOL TARTRATE 25 MG: 25 TABLET, FILM COATED ORAL at 21:25

## 2020-10-04 RX ADMIN — ARIPIPRAZOLE 5 MG: 5 TABLET ORAL at 08:15

## 2020-10-04 RX ADMIN — SUCRALFATE 1 G: 1 TABLET ORAL at 21:25

## 2020-10-04 RX ADMIN — CLONIDINE HYDROCHLORIDE 0.1 MG: 0.1 TABLET ORAL at 21:25

## 2020-10-04 RX ADMIN — GABAPENTIN 300 MG: 300 CAPSULE ORAL at 08:15

## 2020-10-04 RX ADMIN — GABAPENTIN 300 MG: 300 CAPSULE ORAL at 16:32

## 2020-10-04 RX ADMIN — BUSPIRONE HYDROCHLORIDE 5 MG: 5 TABLET ORAL at 21:25

## 2020-10-04 RX ADMIN — HYDROXYZINE HYDROCHLORIDE 50 MG: 50 TABLET, FILM COATED ORAL at 21:24

## 2020-10-04 RX ADMIN — GABAPENTIN 300 MG: 300 CAPSULE ORAL at 21:25

## 2020-10-04 RX ADMIN — SUCRALFATE 1 G: 1 TABLET ORAL at 12:26

## 2020-10-04 RX ADMIN — METOPROLOL TARTRATE 25 MG: 25 TABLET, FILM COATED ORAL at 08:15

## 2020-10-04 RX ADMIN — NICOTINE 1 PATCH: 21 PATCH, EXTENDED RELEASE TRANSDERMAL at 08:16

## 2020-10-04 RX ADMIN — BUSPIRONE HYDROCHLORIDE 5 MG: 5 TABLET ORAL at 08:15

## 2020-10-04 RX ADMIN — PANTOPRAZOLE SODIUM 20 MG: 20 TABLET, DELAYED RELEASE ORAL at 07:27

## 2020-10-04 RX ADMIN — AMLODIPINE BESYLATE 5 MG: 5 TABLET ORAL at 12:26

## 2020-10-04 RX ADMIN — BUSPIRONE HYDROCHLORIDE 5 MG: 5 TABLET ORAL at 16:32

## 2020-10-04 RX ADMIN — INSULIN LISPRO 3 UNITS: 100 INJECTION, SOLUTION INTRAVENOUS; SUBCUTANEOUS at 09:41

## 2020-10-05 LAB
GLUCOSE SERPL-MCNC: 127 MG/DL (ref 65–140)
GLUCOSE SERPL-MCNC: 136 MG/DL (ref 65–140)
GLUCOSE SERPL-MCNC: 139 MG/DL (ref 65–140)
GLUCOSE SERPL-MCNC: 168 MG/DL (ref 65–140)

## 2020-10-05 PROCEDURE — 99232 SBSQ HOSP IP/OBS MODERATE 35: CPT | Performed by: PHYSICIAN ASSISTANT

## 2020-10-05 PROCEDURE — 82948 REAGENT STRIP/BLOOD GLUCOSE: CPT

## 2020-10-05 RX ORDER — ARIPIPRAZOLE 10 MG/1
10 TABLET ORAL DAILY
Status: DISCONTINUED | OUTPATIENT
Start: 2020-10-05 | End: 2020-10-07

## 2020-10-05 RX ADMIN — SUCRALFATE 1 G: 1 TABLET ORAL at 21:17

## 2020-10-05 RX ADMIN — SUCRALFATE 1 G: 1 TABLET ORAL at 06:46

## 2020-10-05 RX ADMIN — AMLODIPINE BESYLATE 5 MG: 5 TABLET ORAL at 12:21

## 2020-10-05 RX ADMIN — HYDROXYZINE HYDROCHLORIDE 50 MG: 50 TABLET, FILM COATED ORAL at 21:47

## 2020-10-05 RX ADMIN — CLONIDINE HYDROCHLORIDE 0.1 MG: 0.1 TABLET ORAL at 21:17

## 2020-10-05 RX ADMIN — INSULIN LISPRO 1 UNITS: 100 INJECTION, SOLUTION INTRAVENOUS; SUBCUTANEOUS at 17:07

## 2020-10-05 RX ADMIN — GABAPENTIN 300 MG: 300 CAPSULE ORAL at 09:08

## 2020-10-05 RX ADMIN — ARIPIPRAZOLE 10 MG: 10 TABLET ORAL at 09:08

## 2020-10-05 RX ADMIN — NICOTINE 1 PATCH: 21 PATCH, EXTENDED RELEASE TRANSDERMAL at 09:14

## 2020-10-05 RX ADMIN — BUSPIRONE HYDROCHLORIDE 5 MG: 5 TABLET ORAL at 16:30

## 2020-10-05 RX ADMIN — PANTOPRAZOLE SODIUM 20 MG: 20 TABLET, DELAYED RELEASE ORAL at 06:46

## 2020-10-05 RX ADMIN — GABAPENTIN 300 MG: 300 CAPSULE ORAL at 16:29

## 2020-10-05 RX ADMIN — BUSPIRONE HYDROCHLORIDE 5 MG: 5 TABLET ORAL at 21:17

## 2020-10-05 RX ADMIN — METOPROLOL TARTRATE 25 MG: 25 TABLET, FILM COATED ORAL at 09:08

## 2020-10-05 RX ADMIN — BUSPIRONE HYDROCHLORIDE 5 MG: 5 TABLET ORAL at 09:08

## 2020-10-05 RX ADMIN — INSULIN LISPRO 3 UNITS: 100 INJECTION, SOLUTION INTRAVENOUS; SUBCUTANEOUS at 09:12

## 2020-10-05 RX ADMIN — SUCRALFATE 1 G: 1 TABLET ORAL at 16:29

## 2020-10-05 RX ADMIN — SUCRALFATE 1 G: 1 TABLET ORAL at 12:21

## 2020-10-05 RX ADMIN — METOPROLOL TARTRATE 25 MG: 25 TABLET, FILM COATED ORAL at 21:17

## 2020-10-05 RX ADMIN — GABAPENTIN 300 MG: 300 CAPSULE ORAL at 21:17

## 2020-10-06 PROBLEM — H01.001 BLEPHARITIS OF RIGHT UPPER EYELID: Status: ACTIVE | Noted: 2020-10-06

## 2020-10-06 LAB
GLUCOSE SERPL-MCNC: 115 MG/DL (ref 65–140)
GLUCOSE SERPL-MCNC: 118 MG/DL (ref 65–140)
GLUCOSE SERPL-MCNC: 135 MG/DL (ref 65–140)
GLUCOSE SERPL-MCNC: 149 MG/DL (ref 65–140)
T4 FREE SERPL-MCNC: 0.96 NG/DL (ref 0.76–1.46)
TSH SERPL DL<=0.05 MIU/L-ACNC: 0.66 UIU/ML (ref 0.36–3.74)

## 2020-10-06 PROCEDURE — 84443 ASSAY THYROID STIM HORMONE: CPT | Performed by: INTERNAL MEDICINE

## 2020-10-06 PROCEDURE — 82948 REAGENT STRIP/BLOOD GLUCOSE: CPT

## 2020-10-06 PROCEDURE — NC001 PR NO CHARGE: Performed by: PHYSICIAN ASSISTANT

## 2020-10-06 PROCEDURE — 84439 ASSAY OF FREE THYROXINE: CPT | Performed by: INTERNAL MEDICINE

## 2020-10-06 PROCEDURE — 99232 SBSQ HOSP IP/OBS MODERATE 35: CPT | Performed by: STUDENT IN AN ORGANIZED HEALTH CARE EDUCATION/TRAINING PROGRAM

## 2020-10-06 RX ORDER — LEVOTHYROXINE SODIUM 0.03 MG/1
25 TABLET ORAL
Status: DISCONTINUED | OUTPATIENT
Start: 2020-10-07 | End: 2020-10-12 | Stop reason: HOSPADM

## 2020-10-06 RX ADMIN — GABAPENTIN 300 MG: 300 CAPSULE ORAL at 20:58

## 2020-10-06 RX ADMIN — INSULIN LISPRO 3 UNITS: 100 INJECTION, SOLUTION INTRAVENOUS; SUBCUTANEOUS at 08:55

## 2020-10-06 RX ADMIN — AMLODIPINE BESYLATE 5 MG: 5 TABLET ORAL at 12:12

## 2020-10-06 RX ADMIN — METOPROLOL TARTRATE 25 MG: 25 TABLET, FILM COATED ORAL at 08:13

## 2020-10-06 RX ADMIN — SUCRALFATE 1 G: 1 TABLET ORAL at 12:12

## 2020-10-06 RX ADMIN — SUCRALFATE 1 G: 1 TABLET ORAL at 15:54

## 2020-10-06 RX ADMIN — GABAPENTIN 300 MG: 300 CAPSULE ORAL at 15:54

## 2020-10-06 RX ADMIN — PANTOPRAZOLE SODIUM 20 MG: 20 TABLET, DELAYED RELEASE ORAL at 06:22

## 2020-10-06 RX ADMIN — NICOTINE 1 PATCH: 21 PATCH, EXTENDED RELEASE TRANSDERMAL at 08:16

## 2020-10-06 RX ADMIN — ARIPIPRAZOLE 10 MG: 10 TABLET ORAL at 08:12

## 2020-10-06 RX ADMIN — METOPROLOL TARTRATE 25 MG: 25 TABLET, FILM COATED ORAL at 21:29

## 2020-10-06 RX ADMIN — SUCRALFATE 1 G: 1 TABLET ORAL at 21:04

## 2020-10-06 RX ADMIN — BUSPIRONE HYDROCHLORIDE 5 MG: 5 TABLET ORAL at 08:13

## 2020-10-06 RX ADMIN — SUCRALFATE 1 G: 1 TABLET ORAL at 06:22

## 2020-10-06 RX ADMIN — CLONIDINE HYDROCHLORIDE 0.1 MG: 0.1 TABLET ORAL at 21:28

## 2020-10-06 RX ADMIN — HYDROXYZINE HYDROCHLORIDE 50 MG: 50 TABLET, FILM COATED ORAL at 21:04

## 2020-10-06 RX ADMIN — GABAPENTIN 300 MG: 300 CAPSULE ORAL at 08:12

## 2020-10-07 LAB
GLUCOSE SERPL-MCNC: 112 MG/DL (ref 65–140)
GLUCOSE SERPL-MCNC: 133 MG/DL (ref 65–140)
GLUCOSE SERPL-MCNC: 154 MG/DL (ref 65–140)
GLUCOSE SERPL-MCNC: 162 MG/DL (ref 65–140)

## 2020-10-07 PROCEDURE — 82948 REAGENT STRIP/BLOOD GLUCOSE: CPT

## 2020-10-07 PROCEDURE — 99232 SBSQ HOSP IP/OBS MODERATE 35: CPT | Performed by: PHYSICIAN ASSISTANT

## 2020-10-07 RX ORDER — ERYTHROMYCIN 5 MG/G
0.5 OINTMENT OPHTHALMIC
Status: DISCONTINUED | OUTPATIENT
Start: 2020-10-07 | End: 2020-10-12 | Stop reason: HOSPADM

## 2020-10-07 RX ORDER — ARIPIPRAZOLE 15 MG/1
15 TABLET ORAL DAILY
Status: DISCONTINUED | OUTPATIENT
Start: 2020-10-08 | End: 2020-10-10

## 2020-10-07 RX ADMIN — GABAPENTIN 300 MG: 300 CAPSULE ORAL at 21:42

## 2020-10-07 RX ADMIN — INSULIN LISPRO 1 UNITS: 100 INJECTION, SOLUTION INTRAVENOUS; SUBCUTANEOUS at 12:30

## 2020-10-07 RX ADMIN — INSULIN LISPRO 3 UNITS: 100 INJECTION, SOLUTION INTRAVENOUS; SUBCUTANEOUS at 08:52

## 2020-10-07 RX ADMIN — SUCRALFATE 1 G: 1 TABLET ORAL at 21:42

## 2020-10-07 RX ADMIN — ARIPIPRAZOLE 10 MG: 10 TABLET ORAL at 08:48

## 2020-10-07 RX ADMIN — SUCRALFATE 1 G: 1 TABLET ORAL at 12:31

## 2020-10-07 RX ADMIN — SUCRALFATE 1 G: 1 TABLET ORAL at 06:55

## 2020-10-07 RX ADMIN — GABAPENTIN 300 MG: 300 CAPSULE ORAL at 08:48

## 2020-10-07 RX ADMIN — INSULIN LISPRO 1 UNITS: 100 INJECTION, SOLUTION INTRAVENOUS; SUBCUTANEOUS at 17:09

## 2020-10-07 RX ADMIN — CLONIDINE HYDROCHLORIDE 0.1 MG: 0.1 TABLET ORAL at 21:42

## 2020-10-07 RX ADMIN — NICOTINE 1 PATCH: 21 PATCH, EXTENDED RELEASE TRANSDERMAL at 08:49

## 2020-10-07 RX ADMIN — METOPROLOL TARTRATE 25 MG: 25 TABLET, FILM COATED ORAL at 21:42

## 2020-10-07 RX ADMIN — GABAPENTIN 300 MG: 300 CAPSULE ORAL at 17:09

## 2020-10-07 RX ADMIN — SUCRALFATE 1 G: 1 TABLET ORAL at 17:09

## 2020-10-07 RX ADMIN — PANTOPRAZOLE SODIUM 20 MG: 20 TABLET, DELAYED RELEASE ORAL at 06:55

## 2020-10-07 RX ADMIN — METOPROLOL TARTRATE 25 MG: 25 TABLET, FILM COATED ORAL at 08:48

## 2020-10-07 RX ADMIN — ERYTHROMYCIN 0.5 INCH: 5 OINTMENT OPHTHALMIC at 12:37

## 2020-10-07 RX ADMIN — ERYTHROMYCIN 0.5 INCH: 5 OINTMENT OPHTHALMIC at 21:42

## 2020-10-07 RX ADMIN — HYDROXYZINE HYDROCHLORIDE 50 MG: 50 TABLET, FILM COATED ORAL at 21:42

## 2020-10-07 RX ADMIN — LEVOTHYROXINE SODIUM 25 MCG: 25 TABLET ORAL at 06:55

## 2020-10-07 RX ADMIN — ERYTHROMYCIN 0.5 INCH: 5 OINTMENT OPHTHALMIC at 17:09

## 2020-10-08 LAB
GLUCOSE SERPL-MCNC: 125 MG/DL (ref 65–140)
GLUCOSE SERPL-MCNC: 140 MG/DL (ref 65–140)
GLUCOSE SERPL-MCNC: 141 MG/DL (ref 65–140)
GLUCOSE SERPL-MCNC: 208 MG/DL (ref 65–140)

## 2020-10-08 PROCEDURE — 99232 SBSQ HOSP IP/OBS MODERATE 35: CPT | Performed by: PHYSICIAN ASSISTANT

## 2020-10-08 PROCEDURE — 82948 REAGENT STRIP/BLOOD GLUCOSE: CPT

## 2020-10-08 RX ADMIN — HYDROXYZINE HYDROCHLORIDE 50 MG: 50 TABLET, FILM COATED ORAL at 21:16

## 2020-10-08 RX ADMIN — PANTOPRAZOLE SODIUM 20 MG: 20 TABLET, DELAYED RELEASE ORAL at 06:17

## 2020-10-08 RX ADMIN — CLONIDINE HYDROCHLORIDE 0.1 MG: 0.1 TABLET ORAL at 21:16

## 2020-10-08 RX ADMIN — ERYTHROMYCIN 0.5 INCH: 5 OINTMENT OPHTHALMIC at 12:42

## 2020-10-08 RX ADMIN — ARIPIPRAZOLE 15 MG: 15 TABLET ORAL at 08:52

## 2020-10-08 RX ADMIN — SUCRALFATE 1 G: 1 TABLET ORAL at 12:42

## 2020-10-08 RX ADMIN — ERYTHROMYCIN 0.5 INCH: 5 OINTMENT OPHTHALMIC at 08:53

## 2020-10-08 RX ADMIN — LEVOTHYROXINE SODIUM 25 MCG: 25 TABLET ORAL at 06:17

## 2020-10-08 RX ADMIN — INSULIN LISPRO 3 UNITS: 100 INJECTION, SOLUTION INTRAVENOUS; SUBCUTANEOUS at 08:53

## 2020-10-08 RX ADMIN — AMLODIPINE BESYLATE 5 MG: 5 TABLET ORAL at 12:42

## 2020-10-08 RX ADMIN — METOPROLOL TARTRATE 25 MG: 25 TABLET, FILM COATED ORAL at 21:16

## 2020-10-08 RX ADMIN — SUCRALFATE 1 G: 1 TABLET ORAL at 15:44

## 2020-10-08 RX ADMIN — GABAPENTIN 300 MG: 300 CAPSULE ORAL at 08:52

## 2020-10-08 RX ADMIN — ERYTHROMYCIN 0.5 INCH: 5 OINTMENT OPHTHALMIC at 15:44

## 2020-10-08 RX ADMIN — SUCRALFATE 1 G: 1 TABLET ORAL at 21:17

## 2020-10-08 RX ADMIN — INSULIN LISPRO 2 UNITS: 100 INJECTION, SOLUTION INTRAVENOUS; SUBCUTANEOUS at 21:43

## 2020-10-08 RX ADMIN — GABAPENTIN 300 MG: 300 CAPSULE ORAL at 21:17

## 2020-10-08 RX ADMIN — METOPROLOL TARTRATE 25 MG: 25 TABLET, FILM COATED ORAL at 08:52

## 2020-10-08 RX ADMIN — ERYTHROMYCIN 0.5 INCH: 5 OINTMENT OPHTHALMIC at 19:53

## 2020-10-08 RX ADMIN — NICOTINE 1 PATCH: 21 PATCH, EXTENDED RELEASE TRANSDERMAL at 08:54

## 2020-10-08 RX ADMIN — GABAPENTIN 300 MG: 300 CAPSULE ORAL at 15:44

## 2020-10-08 RX ADMIN — SUCRALFATE 1 G: 1 TABLET ORAL at 06:17

## 2020-10-09 LAB
GLUCOSE SERPL-MCNC: 118 MG/DL (ref 65–140)
GLUCOSE SERPL-MCNC: 123 MG/DL (ref 65–140)
GLUCOSE SERPL-MCNC: 134 MG/DL (ref 65–140)
GLUCOSE SERPL-MCNC: 147 MG/DL (ref 65–140)

## 2020-10-09 PROCEDURE — 99232 SBSQ HOSP IP/OBS MODERATE 35: CPT | Performed by: PHYSICIAN ASSISTANT

## 2020-10-09 PROCEDURE — 82948 REAGENT STRIP/BLOOD GLUCOSE: CPT

## 2020-10-09 RX ADMIN — SUCRALFATE 1 G: 1 TABLET ORAL at 12:08

## 2020-10-09 RX ADMIN — ERYTHROMYCIN 0.5 INCH: 5 OINTMENT OPHTHALMIC at 16:07

## 2020-10-09 RX ADMIN — ERYTHROMYCIN 0.5 INCH: 5 OINTMENT OPHTHALMIC at 08:15

## 2020-10-09 RX ADMIN — NICOTINE POLACRILEX 2 MG: 2 GUM, CHEWING BUCCAL at 19:59

## 2020-10-09 RX ADMIN — ARIPIPRAZOLE 15 MG: 15 TABLET ORAL at 08:13

## 2020-10-09 RX ADMIN — INSULIN LISPRO 3 UNITS: 100 INJECTION, SOLUTION INTRAVENOUS; SUBCUTANEOUS at 08:16

## 2020-10-09 RX ADMIN — LORAZEPAM 0.5 MG: 0.5 TABLET ORAL at 02:16

## 2020-10-09 RX ADMIN — GABAPENTIN 300 MG: 300 CAPSULE ORAL at 16:06

## 2020-10-09 RX ADMIN — SUCRALFATE 1 G: 1 TABLET ORAL at 21:37

## 2020-10-09 RX ADMIN — CLONIDINE HYDROCHLORIDE 0.1 MG: 0.1 TABLET ORAL at 21:37

## 2020-10-09 RX ADMIN — AMLODIPINE BESYLATE 5 MG: 5 TABLET ORAL at 12:08

## 2020-10-09 RX ADMIN — METOPROLOL TARTRATE 25 MG: 25 TABLET, FILM COATED ORAL at 21:37

## 2020-10-09 RX ADMIN — GABAPENTIN 300 MG: 300 CAPSULE ORAL at 08:14

## 2020-10-09 RX ADMIN — ERYTHROMYCIN 0.5 INCH: 5 OINTMENT OPHTHALMIC at 02:15

## 2020-10-09 RX ADMIN — NICOTINE 1 PATCH: 21 PATCH, EXTENDED RELEASE TRANSDERMAL at 08:12

## 2020-10-09 RX ADMIN — GABAPENTIN 300 MG: 300 CAPSULE ORAL at 21:37

## 2020-10-09 RX ADMIN — PANTOPRAZOLE SODIUM 20 MG: 20 TABLET, DELAYED RELEASE ORAL at 06:45

## 2020-10-09 RX ADMIN — SUCRALFATE 1 G: 1 TABLET ORAL at 06:45

## 2020-10-09 RX ADMIN — ARIPIPRAZOLE 300 MG: KIT at 09:04

## 2020-10-09 RX ADMIN — LEVOTHYROXINE SODIUM 25 MCG: 25 TABLET ORAL at 06:45

## 2020-10-09 RX ADMIN — BENZTROPINE MESYLATE 1 MG: 1 INJECTION INTRAMUSCULAR; INTRAVENOUS at 22:25

## 2020-10-09 RX ADMIN — METOPROLOL TARTRATE 25 MG: 25 TABLET, FILM COATED ORAL at 08:13

## 2020-10-09 RX ADMIN — SUCRALFATE 1 G: 1 TABLET ORAL at 16:06

## 2020-10-09 RX ADMIN — ACETAMINOPHEN 975 MG: 325 TABLET ORAL at 22:22

## 2020-10-09 RX ADMIN — ERYTHROMYCIN 0.5 INCH: 5 OINTMENT OPHTHALMIC at 20:00

## 2020-10-09 RX ADMIN — ERYTHROMYCIN 0.5 INCH: 5 OINTMENT OPHTHALMIC at 12:08

## 2020-10-09 RX ADMIN — HYDROXYZINE HYDROCHLORIDE 75 MG: 50 TABLET, FILM COATED ORAL at 21:37

## 2020-10-10 LAB
GLUCOSE SERPL-MCNC: 124 MG/DL (ref 65–140)
GLUCOSE SERPL-MCNC: 141 MG/DL (ref 65–140)
GLUCOSE SERPL-MCNC: 152 MG/DL (ref 65–140)
GLUCOSE SERPL-MCNC: 170 MG/DL (ref 65–140)

## 2020-10-10 PROCEDURE — 99232 SBSQ HOSP IP/OBS MODERATE 35: CPT | Performed by: PSYCHIATRY & NEUROLOGY

## 2020-10-10 PROCEDURE — 82948 REAGENT STRIP/BLOOD GLUCOSE: CPT

## 2020-10-10 RX ORDER — BENZTROPINE MESYLATE 0.5 MG/1
0.5 TABLET ORAL 2 TIMES DAILY
Status: DISCONTINUED | OUTPATIENT
Start: 2020-10-10 | End: 2020-10-12 | Stop reason: HOSPADM

## 2020-10-10 RX ORDER — BENZTROPINE MESYLATE 1 MG/1
1 TABLET ORAL 2 TIMES DAILY
Status: DISCONTINUED | OUTPATIENT
Start: 2020-10-10 | End: 2020-10-10

## 2020-10-10 RX ORDER — ARIPIPRAZOLE 10 MG/1
10 TABLET ORAL DAILY
Status: DISCONTINUED | OUTPATIENT
Start: 2020-10-11 | End: 2020-10-12 | Stop reason: HOSPADM

## 2020-10-10 RX ADMIN — AMLODIPINE BESYLATE 5 MG: 5 TABLET ORAL at 11:18

## 2020-10-10 RX ADMIN — LEVOTHYROXINE SODIUM 25 MCG: 25 TABLET ORAL at 06:59

## 2020-10-10 RX ADMIN — BENZTROPINE MESYLATE 0.5 MG: 0.5 TABLET ORAL at 17:53

## 2020-10-10 RX ADMIN — METOPROLOL TARTRATE 25 MG: 25 TABLET, FILM COATED ORAL at 21:48

## 2020-10-10 RX ADMIN — PANTOPRAZOLE SODIUM 20 MG: 20 TABLET, DELAYED RELEASE ORAL at 06:59

## 2020-10-10 RX ADMIN — ERYTHROMYCIN 0.5 INCH: 5 OINTMENT OPHTHALMIC at 21:45

## 2020-10-10 RX ADMIN — GABAPENTIN 300 MG: 300 CAPSULE ORAL at 15:57

## 2020-10-10 RX ADMIN — INSULIN LISPRO 3 UNITS: 100 INJECTION, SOLUTION INTRAVENOUS; SUBCUTANEOUS at 09:35

## 2020-10-10 RX ADMIN — SUCRALFATE 1 G: 1 TABLET ORAL at 06:59

## 2020-10-10 RX ADMIN — SUCRALFATE 1 G: 1 TABLET ORAL at 11:19

## 2020-10-10 RX ADMIN — SUCRALFATE 1 G: 1 TABLET ORAL at 15:57

## 2020-10-10 RX ADMIN — ERYTHROMYCIN 0.5 INCH: 5 OINTMENT OPHTHALMIC at 12:27

## 2020-10-10 RX ADMIN — INSULIN LISPRO 1 UNITS: 100 INJECTION, SOLUTION INTRAVENOUS; SUBCUTANEOUS at 12:25

## 2020-10-10 RX ADMIN — NICOTINE 1 PATCH: 21 PATCH, EXTENDED RELEASE TRANSDERMAL at 09:41

## 2020-10-10 RX ADMIN — LORAZEPAM 0.5 MG: 0.5 TABLET ORAL at 11:20

## 2020-10-10 RX ADMIN — METOPROLOL TARTRATE 25 MG: 25 TABLET, FILM COATED ORAL at 09:32

## 2020-10-10 RX ADMIN — SUCRALFATE 1 G: 1 TABLET ORAL at 21:49

## 2020-10-10 RX ADMIN — GABAPENTIN 300 MG: 300 CAPSULE ORAL at 21:48

## 2020-10-10 RX ADMIN — HYDROXYZINE HYDROCHLORIDE 75 MG: 50 TABLET, FILM COATED ORAL at 21:49

## 2020-10-10 RX ADMIN — ERYTHROMYCIN 0.5 INCH: 5 OINTMENT OPHTHALMIC at 09:39

## 2020-10-10 RX ADMIN — GABAPENTIN 300 MG: 300 CAPSULE ORAL at 09:32

## 2020-10-10 RX ADMIN — ARIPIPRAZOLE 15 MG: 15 TABLET ORAL at 09:34

## 2020-10-10 RX ADMIN — ERYTHROMYCIN 0.5 INCH: 5 OINTMENT OPHTHALMIC at 15:59

## 2020-10-10 RX ADMIN — INSULIN LISPRO 1 UNITS: 100 INJECTION, SOLUTION INTRAVENOUS; SUBCUTANEOUS at 09:39

## 2020-10-10 RX ADMIN — CLONIDINE HYDROCHLORIDE 0.1 MG: 0.1 TABLET ORAL at 21:49

## 2020-10-11 LAB
GLUCOSE SERPL-MCNC: 144 MG/DL (ref 65–140)
GLUCOSE SERPL-MCNC: 167 MG/DL (ref 65–140)
GLUCOSE SERPL-MCNC: 169 MG/DL (ref 65–140)
GLUCOSE SERPL-MCNC: 176 MG/DL (ref 65–140)

## 2020-10-11 PROCEDURE — 82948 REAGENT STRIP/BLOOD GLUCOSE: CPT

## 2020-10-11 PROCEDURE — 99232 SBSQ HOSP IP/OBS MODERATE 35: CPT | Performed by: PSYCHIATRY & NEUROLOGY

## 2020-10-11 RX ADMIN — AMLODIPINE BESYLATE 5 MG: 5 TABLET ORAL at 13:05

## 2020-10-11 RX ADMIN — GABAPENTIN 300 MG: 300 CAPSULE ORAL at 21:31

## 2020-10-11 RX ADMIN — INSULIN LISPRO 1 UNITS: 100 INJECTION, SOLUTION INTRAVENOUS; SUBCUTANEOUS at 17:13

## 2020-10-11 RX ADMIN — INSULIN LISPRO 3 UNITS: 100 INJECTION, SOLUTION INTRAVENOUS; SUBCUTANEOUS at 09:25

## 2020-10-11 RX ADMIN — CLONIDINE HYDROCHLORIDE 0.1 MG: 0.1 TABLET ORAL at 21:31

## 2020-10-11 RX ADMIN — INSULIN LISPRO 1 UNITS: 100 INJECTION, SOLUTION INTRAVENOUS; SUBCUTANEOUS at 09:25

## 2020-10-11 RX ADMIN — PANTOPRAZOLE SODIUM 20 MG: 20 TABLET, DELAYED RELEASE ORAL at 06:16

## 2020-10-11 RX ADMIN — BENZTROPINE MESYLATE 0.5 MG: 0.5 TABLET ORAL at 09:23

## 2020-10-11 RX ADMIN — NICOTINE 1 PATCH: 21 PATCH, EXTENDED RELEASE TRANSDERMAL at 09:26

## 2020-10-11 RX ADMIN — INSULIN LISPRO 1 UNITS: 100 INJECTION, SOLUTION INTRAVENOUS; SUBCUTANEOUS at 21:32

## 2020-10-11 RX ADMIN — SUCRALFATE 1 G: 1 TABLET ORAL at 21:31

## 2020-10-11 RX ADMIN — GABAPENTIN 300 MG: 300 CAPSULE ORAL at 09:23

## 2020-10-11 RX ADMIN — ARIPIPRAZOLE 10 MG: 10 TABLET ORAL at 09:23

## 2020-10-11 RX ADMIN — METOPROLOL TARTRATE 25 MG: 25 TABLET, FILM COATED ORAL at 09:23

## 2020-10-11 RX ADMIN — METOPROLOL TARTRATE 25 MG: 25 TABLET, FILM COATED ORAL at 21:31

## 2020-10-11 RX ADMIN — SUCRALFATE 1 G: 1 TABLET ORAL at 13:04

## 2020-10-11 RX ADMIN — HYDROXYZINE HYDROCHLORIDE 75 MG: 50 TABLET, FILM COATED ORAL at 21:31

## 2020-10-11 RX ADMIN — ERYTHROMYCIN 0.5 INCH: 5 OINTMENT OPHTHALMIC at 09:24

## 2020-10-11 RX ADMIN — ERYTHROMYCIN 0.5 INCH: 5 OINTMENT OPHTHALMIC at 17:15

## 2020-10-11 RX ADMIN — SUCRALFATE 1 G: 1 TABLET ORAL at 17:14

## 2020-10-11 RX ADMIN — LEVOTHYROXINE SODIUM 25 MCG: 25 TABLET ORAL at 06:16

## 2020-10-11 RX ADMIN — SUCRALFATE 1 G: 1 TABLET ORAL at 06:16

## 2020-10-11 RX ADMIN — NICOTINE POLACRILEX 2 MG: 2 GUM, CHEWING BUCCAL at 10:57

## 2020-10-11 RX ADMIN — GABAPENTIN 300 MG: 300 CAPSULE ORAL at 17:14

## 2020-10-11 RX ADMIN — ERYTHROMYCIN 0.5 INCH: 5 OINTMENT OPHTHALMIC at 13:05

## 2020-10-11 RX ADMIN — BENZTROPINE MESYLATE 0.5 MG: 0.5 TABLET ORAL at 17:14

## 2020-10-11 RX ADMIN — ERYTHROMYCIN 0.5 INCH: 5 OINTMENT OPHTHALMIC at 22:10

## 2020-10-12 VITALS
DIASTOLIC BLOOD PRESSURE: 73 MMHG | OXYGEN SATURATION: 100 % | HEIGHT: 62 IN | SYSTOLIC BLOOD PRESSURE: 107 MMHG | HEART RATE: 58 BPM | TEMPERATURE: 96.7 F | WEIGHT: 194.2 LBS | BODY MASS INDEX: 35.74 KG/M2 | RESPIRATION RATE: 17 BRPM

## 2020-10-12 LAB — GLUCOSE SERPL-MCNC: 159 MG/DL (ref 65–140)

## 2020-10-12 PROCEDURE — 82948 REAGENT STRIP/BLOOD GLUCOSE: CPT

## 2020-10-12 PROCEDURE — 99239 HOSP IP/OBS DSCHRG MGMT >30: CPT | Performed by: PHYSICIAN ASSISTANT

## 2020-10-12 RX ORDER — GABAPENTIN 300 MG/1
300 CAPSULE ORAL 3 TIMES DAILY
Qty: 90 CAPSULE | Refills: 1 | Status: SHIPPED | OUTPATIENT
Start: 2020-10-12 | End: 2021-03-31 | Stop reason: HOSPADM

## 2020-10-12 RX ORDER — HYDROXYZINE HYDROCHLORIDE 25 MG/1
75 TABLET, FILM COATED ORAL
Qty: 90 TABLET | Refills: 0 | Status: SHIPPED | OUTPATIENT
Start: 2020-10-12 | End: 2020-10-12

## 2020-10-12 RX ORDER — ARIPIPRAZOLE 10 MG/1
10 TABLET ORAL DAILY
Qty: 10 TABLET | Refills: 0 | Status: SHIPPED | OUTPATIENT
Start: 2020-10-13 | End: 2021-03-23 | Stop reason: CLARIF

## 2020-10-12 RX ORDER — ERYTHROMYCIN 5 MG/G
0.5 OINTMENT OPHTHALMIC
Qty: 3.5 G | Refills: 0 | Status: SHIPPED | OUTPATIENT
Start: 2020-10-12 | End: 2020-10-12

## 2020-10-12 RX ORDER — HYDROXYZINE HYDROCHLORIDE 25 MG/1
75 TABLET, FILM COATED ORAL
Qty: 90 TABLET | Refills: 1 | Status: SHIPPED | OUTPATIENT
Start: 2020-10-12 | End: 2021-03-31 | Stop reason: HOSPADM

## 2020-10-12 RX ORDER — ERYTHROMYCIN 5 MG/G
0.5 OINTMENT OPHTHALMIC
Qty: 3.5 G | Refills: 0 | Status: SHIPPED | OUTPATIENT
Start: 2020-10-12 | End: 2020-10-14

## 2020-10-12 RX ORDER — BENZTROPINE MESYLATE 0.5 MG/1
0.5 TABLET ORAL 2 TIMES DAILY
Qty: 60 TABLET | Refills: 1 | Status: SHIPPED | OUTPATIENT
Start: 2020-10-12 | End: 2021-03-31 | Stop reason: HOSPADM

## 2020-10-12 RX ORDER — AMLODIPINE BESYLATE 5 MG/1
5 TABLET ORAL
Qty: 7 TABLET | Refills: 0 | Status: SHIPPED | OUTPATIENT
Start: 2020-10-12 | End: 2021-03-23 | Stop reason: CLARIF

## 2020-10-12 RX ORDER — SUCRALFATE 1 G/1
1 TABLET ORAL
Qty: 30 TABLET | Refills: 0 | Status: SHIPPED | OUTPATIENT
Start: 2020-10-12 | End: 2021-03-23 | Stop reason: CLARIF

## 2020-10-12 RX ORDER — PANTOPRAZOLE SODIUM 20 MG/1
20 TABLET, DELAYED RELEASE ORAL
Qty: 7 TABLET | Refills: 0 | Status: SHIPPED | OUTPATIENT
Start: 2020-10-12 | End: 2021-03-31 | Stop reason: HOSPADM

## 2020-10-12 RX ORDER — CLONIDINE HYDROCHLORIDE 0.1 MG/1
0.1 TABLET ORAL
Qty: 30 TABLET | Refills: 1 | Status: SHIPPED | OUTPATIENT
Start: 2020-10-12

## 2020-10-12 RX ORDER — LEVOTHYROXINE SODIUM 0.03 MG/1
25 TABLET ORAL
Qty: 7 TABLET | Refills: 0 | Status: SHIPPED | OUTPATIENT
Start: 2020-10-13

## 2020-10-12 RX ADMIN — BENZTROPINE MESYLATE 0.5 MG: 0.5 TABLET ORAL at 09:06

## 2020-10-12 RX ADMIN — LEVOTHYROXINE SODIUM 25 MCG: 25 TABLET ORAL at 06:23

## 2020-10-12 RX ADMIN — SUCRALFATE 1 G: 1 TABLET ORAL at 06:23

## 2020-10-12 RX ADMIN — METOPROLOL TARTRATE 25 MG: 25 TABLET, FILM COATED ORAL at 09:06

## 2020-10-12 RX ADMIN — INSULIN LISPRO 1 UNITS: 100 INJECTION, SOLUTION INTRAVENOUS; SUBCUTANEOUS at 09:10

## 2020-10-12 RX ADMIN — PANTOPRAZOLE SODIUM 20 MG: 20 TABLET, DELAYED RELEASE ORAL at 06:23

## 2020-10-12 RX ADMIN — ARIPIPRAZOLE 10 MG: 10 TABLET ORAL at 09:06

## 2020-10-12 RX ADMIN — INSULIN LISPRO 3 UNITS: 100 INJECTION, SOLUTION INTRAVENOUS; SUBCUTANEOUS at 09:10

## 2020-10-12 RX ADMIN — ERYTHROMYCIN 0.5 INCH: 5 OINTMENT OPHTHALMIC at 09:00

## 2020-10-12 RX ADMIN — GABAPENTIN 300 MG: 300 CAPSULE ORAL at 09:06

## 2020-11-08 NOTE — ASSESSMENT & PLAN NOTE
[facility] INFECTIOUS [facility] PROGRESS NOTE       SUBJECTIVE    No new events reported, some bloody et secretions  Appreciate plan for repeat blood cxs with rising wbc        MEDICATIONS  Current Facility-Administered Medications   Medication Dose Route Frequency Provider Last Rate Last Dose   • [START ON 11/9/2020] collagenase (SANTYL) ointment   Topical Daily Vimal Haas MD       • [START ON 11/9/2020] meropenem (MERREM) 500 mg in sodium chloride 0.9 % 100 mL IVPB  500 mg Intravenous Daily Shree Erickson MD       • sodium chloride 0.9% infusion   Intravenous Continuous PRN Eleno Rome MD       • albumin human (SPA) 25 % injection 25 g  25 g Intravenous Once Calos Vargas MD       • sodium chloride (NORMAL SALINE) 0.9 % bolus 100-200 mL  100-200 mL Intravenous PRN Calos Vargas MD       • insulin glargine (LANTUS) injection 12 Units  12 Units Subcutaneous 2 times per day Boris Bryant MD   12 Units at 11/08/20 1008   • QUEtiapine (SEROQUEL) 10 MG/ML (compounded) oral suspension 25 mg  25 mg Oral 3 times per day Vidal Peterson MD   25 mg at 11/08/20 1411   • propofol (DIPRIVAN) infusion  0-100 mcg/kg/min (Dosing Weight) Intravenous Continuous Eleno Rome MD 14.4 mL/hr at 11/08/20 0659 30 mcg/kg/min at 11/08/20 0659   • guaifenesin 100 MG/5ML solution 300 mg  300 mg Oral Q4H Ilir Charlton MD   300 mg at 11/08/20 0415   • albuterol (VENTOLIN) nebulizer 2.5 mg  2.5 mg Nebulization Q6H Resp PRN Vidal Peterson MD   2.5 mg at 11/06/20 1016   • dexMEDETomidine (PRECEDEX) 400 mcg/100 mL in sodium chloride 0.9 % infusion  0.1-1 mcg/kg/hr (Dosing Weight) Intravenous Continuous Shivam Lebron MD   Stopped at 11/07/20 1156   • niCARdipine (CARDENE) 40 mg/200 mL in NaCl 0.83 % infusion  0-15 mg/hr Intravenous Continuous Eleno Rome MD 25 mL/hr at 11/07/20 0431 5 mg/hr at 11/07/20 0431   • dextrose 5 % / sodium chloride 0.45% infusion   Intravenous Continuous Vidal Peterson MD  Noted   Management per primary 10 mL/hr at 11/03/20 2259     • hydrALAZINE (APRESOLINE) injection 10 mg  10 mg Intravenous Q6H PRN Margaret Leach MD   10 mg at 10/26/20 0813   • sodium chloride (PF) 0.9 % injection 10 mL  10 mL Intracatheter PRN Jose Carvalho MD       • sodium chloride (NORMAL SALINE) 0.9 % bolus 1,000 mL  1,000 mL CRRT PRN Jose aCrvalho MD 4,000 mL/hr at 10/25/20 1019 1,000 mL at 10/25/20 1019   • Standard Replacement Fluid with Calcium, Potassium Chloride 4 mEq/L (PRISMASOL BGK 4/2.5)   CRRT Continuous Jose Carvalho MD       • norepinephrine (LEVOPHED) 32 mg/250 mL in sodium chloride 0.9 % infusion  0-100 mcg/min Intravenous Continuous Ilir Charlton MD 0.47 mL/hr at 10/18/20 1353 1.003 mcg/min at 10/18/20 1353   • acetaminophen (TYLENOL) tablet 650 mg  650 mg Oral Q6H PRN Outside Provider       • acetaminophen (TYLENOL) suppository 650 mg  650 mg Rectal Q6H PRN Outside Provider       • HYDROcodone-acetaminophen (NORCO)  MG per tablet 1 tablet  1 tablet Oral Q4H PRN Outside Provider   1 tablet at 11/08/20 0545   • HYDROcodone-acetaminophen (NORCO) 5-325 MG per tablet 1 tablet  1 tablet Oral Q4H PRN Outside Provider       • ALPRAZolam (XANAX) tablet 0.25 mg  0.25 mg Oral Q12H PRN Outside Provider   0.25 mg at 11/05/20 0053   • AMIODarone (PACERONE) tablet 200 mg  200 mg Per NG tube 2 times per day Outside Provider   200 mg at 11/08/20 1007   • AMIODarone 150 mg in dextrose 100 mL bolus IVPB  150 mg Intravenous PRN Outside Provider       • bisacodyl (DULCOLAX) suppository 10 mg  10 mg Rectal Daily PRN Outside Provider       • cholecalciferol (VITAMIN D) tablet 25 mcg  25 mcg Oral Daily Outside Provider   25 mcg at 11/08/20 1007   • DOBUTamine (DOBUTREX) 500 mg/250 mL in dextrose 5 % infusion  5 mcg/kg/min (Dosing Weight) Intravenous Continuous Vidal Peterson MD 15.9 mL/hr at 11/08/20 0659 5 mcg/kg/min at 11/08/20 0659   • docusate sodium (COLACE) 50 MG/5ML liquid 100 mg  100 mg Oral BID Outside  Provider   100 mg at 11/08/20 1008   • fentaNYL (SUBLIMAZE) injection 25 mcg  25 mcg Intravenous Q1H PRN Outside Provider   25 mcg at 11/06/20 0009   • furosemide (LASIX INJECT) injection 20 mg  20 mg Intravenous PRN Outside Provider       • insulin lispro (HumaLOG) scheduled dose AND correction dose   Subcutaneous 4 times per day Outside Provider   2 Units at 11/08/20 1232   • dextrose 50 % injection 25 g  25 g Intravenous PRN Outside Provider   25 g at 10/13/20 0820   • dextrose 50 % injection 12.5 g  12.5 g Intravenous PRN Outside Provider       • dextrose 5 % infusion   Intravenous Continuous PRN Outside Provider 100 mL/hr at 11/02/20 2025     • glucagon (GLUCAGEN) injection 1 mg  1 mg Intramuscular PRN Outside Provider       • dextrose (GLUTOSE) 40 % gel 15 g  15 g Oral PRN Outside Provider       • dextrose (GLUTOSE) 40 % gel 30 g  30 g Oral PRN Outside Provider       • Lidocaine HCl 2 % PF injection Solution 106.2 mg  106.2 mg Intravenous Q5 Min PRN Outside Provider       • magnesium hydroxide (MILK OF MAGNESIA) 400 MG/5ML suspension 30 mL  30 mL Oral BID PRN Outside Provider       • magnesium oxide (MAG-OX) tablet Tab 400 mg  400 mg Oral BID PRN Outside Provider       • MIDAZolam (VERSED) injection 1 mg  1 mg Intravenous Q1H PRN Outside Provider   1 mg at 11/06/20 0521   • B complex-vitamin C-folic acid (NEPHRO-AJ) tablet 0.8 mg  1 tablet Oral Daily Outside Provider   0.8 mg at 11/08/20 1125   • ondansetron (ZOFRAN) injection 4 mg  4 mg Intravenous Q6H PRN Outside Provider   4 mg at 11/03/20 2120   • pantoprazole (PROTONIX) 40 MG/20ML (compounded) suspension 40 mg  40 mg Oral 2 times per day Outside Provider   40 mg at 11/08/20 1125   • polyethylene glycol (MIRALAX) packet 17 g  17 g Oral Daily Outside Provider   17 g at 11/08/20 1008   • sennosides (SENOKOT) 8.8 MG/5ML syrup 5 mL  5 mL Oral Nightly Outside Provider   5 mL at 11/06/20 2117   • sodium chloride 0.9% infusion   Intravenous Continuous Outside  Provider 10 mL/hr at 10/29/20 0700     • sodium chloride 0.9% infusion   Intravenous Continuous Outside Provider 10 mL/hr at 10/31/20 1859     • sodium chloride 0.9% infusion   Intravenous Continuous Outside Provider 20 mL/hr at 11/08/20 0659     • sodium chloride 0.9% infusion   Intravenous Continuous Outside Provider 3 mL/hr at 10/24/20 1429         Allergy:  ALLERGIES:   Allergen Reactions   • Heparin Other (See Comments)         Review of Systems:  Review of Systems   Reason unable to perform ROS: trach in place.          PHYSICAL EXAM :  Vitals:    11/08/20 1519   BP: 101/50   Pulse: (!) 106   Resp: (!) 22   Temp: 100 °F (37.8 °C)       Physical Exam  Vitals signs and nursing note reviewed.   Constitutional:       General: He is not in acute distress.     Comments: Temporal wasting   HENT:      Head: Normocephalic.   Neck:      Musculoskeletal: Neck supple.      Comments: Trach in place     Cardiovascular:      Rate and Rhythm: Normal rate and regular rhythm.      Comments: Cannulas in place  Chest tubes in place  Pulmonary:      Effort: Pulmonary effort is normal.      Breath sounds: Normal breath sounds.      Comments: Left sided chest dressing chest tube  Abdominal:      General: Bowel sounds are normal.      Palpations: Abdomen is soft.      Tenderness: There is no abdominal tenderness.   Musculoskeletal:         General: No swelling.   Skin:     General: Skin is warm.      Findings: No rash.   Neurological:      Comments: awake   Psychiatric:      Comments:               LABORATORY:      Recent Labs     11/07/20  0405 11/07/20 2015 11/08/20  0300 11/08/20  1408   SODIUM 136 141 142 141   POTASSIUM 3.7 3.5 3.8 3.8   CO2 28 29 29 29   ANIONGAP 10 8* 9* 9*   GLUCOSE 189* 166* 151* 174*   BUN 36* 28* 38* 57*   CREATININE 1.06 1.01 1.21* 1.59*   CALCIUM 8.5 8.6 8.6 8.7   BILIRUBIN 1.6* 1.8* 1.5*  --    * 114* 73*  --    GPT 44 36 32  --    ALKPT 497* 377* 353*  --         Recent Labs     11/07/20 2015  11/08/20  0300 11/08/20  1408   WBC 31.2* 30.6* 33.2*   RBC 2.97* 2.98* 2.90*   HGB 8.5* 8.5* 8.3*   HCT 26.8* 27.2* 26.2*   PLT 57* 55* 73*   MCV 90.2 91.3 90.3   MCH 28.6 28.5 28.6   MCHC 31.7* 31.3* 31.7*   NRBCRE 0 0 0         Microbiology Results     None        Fungal Culture and Smear [[id number]] Collected: 10/14/20 0915   Order Status: Completed Specimen: Bronchoalveolar Lavage Updated: 10/15/20 1030    Specimen Description BRONCHOALVEOLAR LAVAGE  LUNG LEFT    Fungal Smear NO FUNGAL ELEMENTS SEEN    CULTURE NO GROWTH <24 HRS.    REPORT STATUS PENDING   Fungal Culture and Smear [[id number]] Collected: 10/13/20 1145   Order Status: Completed Specimen: Pleural (Thoracentesis) Fluid Updated: 10/15/20 0716    Specimen Description PLEURAL (THORACENTESIS) FLUID LEFT SURGERY    Fungal Smear NO FUNGAL ELEMENTS SEEN    CULTURE NO GROWTH 2 DAYS.    REPORT STATUS PENDING   Anaerobic and Aerobic Culture and Smear [[id number]] Collected: 10/13/20 1145   Order Status: Completed Specimen: Pleural (Thoracentesis) Fluid Updated: 10/15/20 0604    Specimen Description PLEURAL (THORACENTESIS) FLUID LEFT SURGERY    SPECIAL REQUESTS MEROPENEM    Gram Smear NO ORGANISMS SEEN    Gram Smear POLYMORPHONUCLEAR CELLS PRESENT    Gram Smear SLIDE PREPARED BY CYTOSPIN    CULTURE NO GROWTH 2 DAYS.    REPORT STATUS PENDING   Mycobacteria Culture & Smear [[id number]] Collected: 10/14/20 0915   Order Status: Completed Specimen: Bronchoalveolar Lavage Updated: 10/15/20 0529    Specimen Description BRONCHOALVEOLAR LAVAGE  LUNG LEFT    AFB SMEAR NO ACID FAST BACILLI SEEN    CULTURE PENDING    REPORT STATUS PENDING   BRONCHIAL QUANTITATIVE, BACTERIAL CULTURE WITH GRAM STAIN [[id number]] Collected: 10/14/20 0915   Order Status: Completed Specimen: Bronchoalveolar Lavage from Bronchial Updated: 10/14/20 1618    Specimen Description BRONCHOALVEOLAR LAVAGE  LUNG LEFT    Gram Smear NO ORGANISMS SEEN    Gram Smear MANY POLYMORPHONUCLEAR CELLS     Gram Smear FEW PULMONARY MACROPHAGES    Gram Smear NO EPITHELIAL CELLS SEEN    CULTURE PENDING    REPORT STATUS PENDING   Mycobacteria Culture & Smear [[id number]] Collected: 10/13/20 1145   Order Status: Completed Specimen: Pleural (Thoracentesis) Fluid Updated: 10/14/20 1608    Specimen Description PLEURAL (THORACENTESIS) FLUID LEFT SURGERY    AFB SMEAR NO ACID FAST BACILLI SEEN    CULTURE NO GROWTH <24 HRS.    REPORT STATUS PENDING   Staphylococcus aureus Methicillin Resistant (MRSA) PCR [[id number]] Collected: 10/12/20 2245   Order Status: Completed Specimen: Swab from Nares Updated: 10/13/20 0021    PCR SOURCE NARES    MRSA PCR NOT DETECTED    Comment: MRSA NOT DETECTED BY NUCLEIC ACID AMPLIFICATION. A negative result does not rule out MRSA concentrations below the level of detection of the assay.                                                                                                                       Rapid SARS-CoV-2 by PCR [[id number]] Collected: 10/12/20 1600   Order Status: Completed Specimen: Swab from Nasopharyngeal Updated: 10/12/20 1814    Source, 2019 Novel Coronavirus (SARS-CoV-2) Nasopharyngeal    Rapid SARS-COV-2 by PCR NOT DETECTED    Comment: SARS-CoV-2 nucleic acid has not been detected.  Testing was performed using the [employer] Xpert Xpress SARS-CoV-2 RT-PCR assay that has been given Emergency Use Authorization (EUA) by the [place] Food and Drug Administration (FDA).  These results are    considered definitive and do not need to be confirmed by another method.        Isolation Guidelines       Comment: Do not use this test result as the sole decision-maker for discontinuing isolation, de-escalate to NonCOVID-19, droplet and contact isolation per the following guidelines:   If a patient has met criteria below and tested negative for COVID-19,   COVID isolation precautions may be \"de-escalated\" to Droplet and Contact precautions.   The following criteria must be met before a  patient can be removed from isolation   At least 3 days (72 hours) since resolution of fever without the use of fever-reducing medications   AND   Significant improvement in respiratory symptoms (e.g., cough, shortness of breath)   AND   Negative COVID-19 Test     This removes the obligation for N95 masks and eyewear protection.     The rationale to continue Droplet and Contact precautions is to protect both patient and HCW from any other virus causing the patient's illness   Check COVID-19 Toolkit for most up to date information: https://www.[facility].com/covid-19-info/             IMAGING  XR CHEST PA OR AP 1 VIEW   Final Result   No significant change is seen.      Electronically Signed by: PRAKASH KAT M.D.   Signed on: 11/08/2020 05:32 AM      XR CHEST AP OR PA 1 VIEW   Final Result   Removal of right chest tube.  No measurable right pneumothorax is seen.      Electronically Signed by: ARGELIA AYALA MD    Signed on: 11/7/2020 2:20 PM          XR CHEST AP OR PA 1 VIEW   Final Result   1. Tubes and lines are unchanged.    2. Increased consolidation in the left lung.       Electronically Signed by: TYSON YODER M.D.   Signed on: 11/07/2020 04:41 AM      XR CHEST AP OR PA 1 VIEW   Final Result   1. Extensive left-sided infiltrates are again evident. Aeration on the right has improved minimally.   2. Tiny left pleural effusion. No pneumothorax on either side.   3. The tracheostomy, Dobbhoff tube, bilateral venous lines and right chest tube remain in place.       Electronically Signed by: ROSELIA MOLINA M.D.   Signed on: 11/06/2020 06:12 AM      XR CHEST AP OR PA 1 VIEW   Final Result   FINDINGS/IMPRESSION: Interval removal of a right subclavian [place]-Farhan catheter.  Stable tracheostomy, enteric tube terminating beyond the field-of-view in the abdomen, left subclavian dialysis catheter, right IJ triple-lumen, right chest tube, and    femoral ECMO cannula.  Calcific aortic atherosclerosis and moderate  enlargement of cardiac silhouette.  Diffuse left lung airspace opacities and pleural thickening.  Clear right lung and pleural space.  No definite pneumothorax.  Visualized osseous    structures and upper abdomen are stable.         Electronically Signed by: ERIKA RYAN M.D.    Signed on: 11/5/2020 3:00 PM          XR CHEST AP OR PA 1 VIEW   Final Result   Impression:    Stable exam as above. Support devices as above.      Electronically Signed by: BRONSON SHELDON MD    Signed on: 11/5/2020 12:32 PM          XR CHEST AP OR PA 1 VIEW   Final Result   1. Slightly improved aeration in the left lung.    2. Support apparatus as above.       Electronically Signed by: GISSELL VILLAGOMEZ M.D.   Signed on: 11/05/2020 05:34 AM      XR CHEST PA OR AP 1 VIEW   Final Result   No pneumothorax is seen.      Electronically Signed by: PRAKASH KAT M.D.   Signed on: 11/04/2020 04:50 AM      XR CHEST AP OR PA 1 VIEW   Final Result   Impression:    Stable exam as above. Support devices as above.      Electronically Signed by: BRONSON SHELDON MD    Signed on: 11/3/2020 11:48 AM          XR ABDOMEN 1 VIEW   Final Result   FINDINGS/IMPRESSION:        Supine exam of the upper abdomenwas performed. There is an enteric tube in the left upper quadrant, likely within the gastric body.  There is a ECMO catheter extending from the right inguinal region.  There is mild gaseous distention of nondilated small    bowel and colonic loops. The bowel gas pattern is nonspecific for obstruction.       Electronically Signed by: BRONSON SHELDON MD    Signed on: 11/3/2020 11:47 AM          XR CHEST AP OR PA 1 VIEW   Final Result   No pneumothorax      Electronically Signed by: PRAKASH KAT M.D.   Signed on: 11/03/2020 04:52 AM      XR CHEST AP OR PA 1 VIEW   Final Result   Impression:    Stable exam as above. Support devices as above.      Electronically Signed by: BRONSON SHELDON MD    Signed on: 11/2/2020 6:34 AM          XR CHEST AP OR PA 1 VIEW   Final  Result   FINDINGS/IMPRESSION:      Since the examination of the preceding day, there has been removal of the previously observed right jugular central venous catheter.      There are extensive residual left lung infiltrative changes with a probable associated pleural effusion, which appear unchanged.      Also noted is a residual nodular opacity in the projection of the periphery of the right mid lung.      There are tracheostomy, feeding and right chest tubes.  There is a right subclavian [place]-Farhan catheter.  There is a left subclavian central venous catheter.  There are mid-sternal sutures.  There are multiple midline metallic surgical staples.  There are    degenerative changes of the thoracic spine and bilateral acromioclavicular joints.  The heart size is normal.  There is slight tortuosity of the thoracic aorta.      There is no demonstrable pneumothorax.      Electronically Signed by: TEVIN OGDEN MD    Signed on: 11/1/2020 7:16 AM          XR CHEST AP OR PA 1 VIEW   Final Result   1. Improving right pleural and parenchymal opacities. Otherwise no change.    2. Support lines and tubes as above.       Electronically Signed by: MATT BELL M.D.   Signed on: 10/31/2020 06:29 AM      XR CHEST AP OR PA 1 VIEW   Final Result   FINDINGS/IMPRESSION:      There are extensive left lung infiltrative/atelectatic changes with an associated pleural effusion, which appear similar to the findings observed on the examination of the previous day.      Also again noted are residual right mid lung and basilar infiltrates.      There are tracheostomy, feeding and right chest tubes.  There is a femoral ECMO cannula.  There is a right subclavian Taylor-Farhan catheter.  There are left jugular and left subclavian central venous catheters.  There are mid-sternal sutures.  There are    multiple midline metallic surgical staples.  There are degenerative changes of the thoracic spine and bilateral acromioclavicular joints.  The heart  size is probably normal.  There is calcification of the thoracic aorta.      There is no demonstrable pneumothorax.      Electronically Signed by: TEVIN OGDEN MD    Signed on: 10/30/2020 7:08 AM          XR ABDOMEN 1 VIEW   Final Result   FINDINGS/IMPRESSION:      There is a feeding tube, the distal radiopaque and which is identified in the projection of the expected location of the stomach.        There is a right femoral ECMO cannula.  There are subclavian and jugular central venous catheters.  There is a right chest tube.  There are mid-sternal sutures.  There are multiple midline metallic surgical staples.  There are degenerative changes of the    thoracolumbar spine.      There is no radiographic evidence of intra-abdominal free air, mass lesion, free fluid or abnormal calcification.      The visualized intra-abdominal gas pattern appears otherwise unremarkable.      Incidentally noted are left mid lung and basilar infiltrative/atelectatic changes with an associated pleural effusion and patchy right mid lung and basilar infiltrates.      Electronically Signed by: TEVIN OGDEN MD    Signed on: 10/30/2020 7:10 AM          XR CHEST AP OR PA 1 VIEW   Final Result   1. Overall stable position of the support lines and tubes.    2. Persisting consolidation of the left hemithorax and worsening airspace consolidation in the periphery of the right middle lobe.       Electronically Signed by: HUGO MENDIOLA M.D.   Signed on: 10/29/2020 04:57 AM      XR CHEST PA OR AP 1 VIEW   Final Result   Impression:    No significant interval change. Support devices as above.      Electronically Signed by: MADDI DUFF MD    Signed on: 10/28/2020 6:55 AM          XR ABDOMEN 1 VIEW   Final Result   Impression:    NG tube slightly retracted. Left lower lobe infiltrate.      Electronically Signed by: KATHY YOUNG MD    Signed on: 10/27/2020 9:35 PM          XR CHEST PA OR AP 1 VIEW   Final Result   Impression:    New [place]-Farhan  catheter. New right chest tube. Removal of ECMO catheter. No clear evidence of pneumothorax.      Electronically Signed by: KATHY YOUNG MD    Signed on: 10/27/2020 7:32 PM          XR CHEST PA OR AP 1 VIEW   Final Result       Left chest tube removed.  Small less than 2% pneumothorax.  Diffuse infiltrates of the left lung.  These remain stable.         Electronically Signed by: PALOMO BANERJEE M.D.    Signed on: 10/27/2020 12:14 PM          XR CHEST AP OR PA 1 VIEW   Final Result   No pneumothorax is seen. No significant change since previous exam.      Electronically Signed by: PRAKASH KAT M.D.   Signed on: 10/27/2020 04:41 AM      XR CHEST AP OR PA 1 VIEW   Final Result   Stable chest x-ray findings since the prior exam.       Electronically Signed by: JM SINGH M.D.   Signed on: 10/26/2020 05:41 AM      CT HEAD WO CONTRAST   Final Result      Expected interval evolution of subacute infarcts in the left temporooccipital.  No evidence of hemorrhagic conversion.      Electronically Signed by: NEREYDA BARNETT MD    Signed on: 10/25/2020 9:40 AM          CT CHEST ABDOMEN PELVIS WO CONTRAST   Final Result      1.    Stable position of a left-sided chest tube with significant decrease in the large loculated pleural effusion; small component remains.  However, significant increase in near confluent consolidation with air bronchograms throughout the left lung as    well as patchy consolidation in the right lower lobe concerning for multifocal pneumonia, questionable necrotic areas within the left lung.        2.    Stable small right pleural effusion.        3.   Unchanged, 2.0 cm hypodensity along the inferior margin of the pancreatic tail with mild surrounding inflammatory changes.  Differential considerations include a pseudocyst, cystic neoplasm, or focal pancreatitis.        4.    Diffuse circumferential bladder wall thickening which could be seen with cystitis.  Correlate with UA.             Electronically Signed by: MADDI DUFF MD    Signed on: 10/25/2020 9:57 AM          XR CHEST PA OR AP 1 VIEW   Final Result   1. Opacity in the left hemithorax appears similar to the prior exam, which seems to represent components of pleural fluid and diffuse parenchymal consolidation of the left lung.    2. Area of lucency laterally in the left mid chest probably represents air within the left pleural space and was not definitely seen previously. A left chest tube is present, but there may be a kink, with sharp angulation at the level of the side port.    Please correlate with functioning of the tube.    3. Increased hazy opacity of the right lung, probably due to increased interstitial pulmonary edema.       Electronically Signed by: TONY PETERS M.D.   Signed on: 10/25/2020 05:59 AM      XR CHEST PA OR AP 1 VIEW   Final Result    Persistent left hemithorax airspace opacities with air bronchograms noted, stable in appearance.        Improved right lung field airspace opacities.      Electronically Signed by: HAMMAD FLEMING DO    Signed on: 10/24/2020 7:01 AM          XR CHEST AP OR PA 1 VIEW   Final Result   Overall, no significant change in appearance of the lungs. Persistent extensive left lung consolidation/infiltrate with less prominent right lung opacities.       Electronically Signed by: GALO MARQUEZ M.D.   Signed on: 10/23/2020 05:49 AM      XR CHEST AP OR PA 1 VIEW   Final Result   Overall, no significant change in appearance of the lungs.       Electronically Signed by: GALO MARQUEZ M.D.   Signed on: 10/23/2020 03:00 AM      XR CHEST PA OR AP 1 VIEW   Final Result   1.    The inferior left chest tube appears to be kinked, stable.  Other support lines as described above.     2.    Stable diffuse left lung consolidation with bilateral loculated pleural effusions.      Electronically Signed by: ZOE SPIVEY MD    Signed on: 10/22/2020 11:16 AM          XR CHEST AP OR PA 1 VIEW   Final Result   No  significant interval change.  Continued follow-up is recommended.      Electronically Signed by: RHETT GONZALEZ M.D.   Signed on: 10/22/2020 06:17 AM      XR CHEST AP OR PA 1 VIEW   Final Result   FINDINGS/IMPRESSION:      Since the preceding examination of the same day, there has been apparent partial resolution of the previously observed medial right mid lung and basilar infiltrative/atelectatic changes      There are relatively extensive infiltrative/atelectatic changes involving a significant portion of the left lung with an associated loculated left pleural effusion, which appear unchanged.      There are tracheostomy, feeding and left chest tubes.  There is a femoral ECMO cannula.  There is a mediastinal drain.  There are mid-sternal sutures.  There are midline metallic surgical staples.  There are degenerative changes of the thoracic spine and    bilateral acromioclavicular joints.  The heart size is normal.  There is calcification of the thoracic aorta.      There is no demonstrable pneumothorax.      Electronically Signed by: TEVIN OGDEN MD    Signed on: 10/21/2020 2:45 PM          XR CHEST AP OR PA 1 VIEW   Final Result   Stable exam       Electronically Signed by: GISSELL VILLAGOMEZ M.D.   Signed on: 10/21/2020 04:02 AM      XR CHEST PA OR AP 1 VIEW   Final Result   Bilateral chest tubes.  Extensive opacification of the left hemithorax.  Right perihilar and right lung base opacity.  Right costophrenic angle is sharp.        Electronically Signed by: ARGELIA AYALA MD    Signed on: 10/20/2020 7:35 AM          XR CHEST PA OR AP 1 VIEW   Final Result   FINDINGS/IMPRESSION:      Since the examination of the preceding day, there has been apparent progression of the previously observed medial right basilar infiltrate.      There are relatively extensive residual left lung infiltrative changes with an associated pleural effusion, which appear unchanged      Also again noted is a residual right mid lung infiltrate  versus loculated pleural effusion.      There are tracheostomy, feeding and left chest tubes.  There is a mediastinal drain.  There is a femoral ECMO cannula.  There is a right subclavian central venous catheter.  There are mid-sternal sutures and midline metallic surgical staples.  There are    degenerative changes of the thoracic spine and bilateral acromioclavicular joints.  The heart size is normal.  There is tortuosity and calcification of the thoracic aorta.      There is no definite demonstrable pneumothorax.      Electronically Signed by: TEVIN OGDEN MD    Signed on: 10/19/2020 7:26 AM          XR CHEST PA OR AP 1 VIEW   Final Result   1. Slight improved aeration of the left upper lobe. Alveolar infiltrates in the left lung otherwise unchanged.    2. Small left effusion with questionable small anterior pneumothorax on the left.    3. Stable ground-glass opacities in the right lung.    4. Support lines and tubes are in stable position. Again seen is kinking of the inferior left chest tube.       Electronically Signed by: JUAN ANTONIO CALERO M.D.   Signed on: 10/18/2020 02:25 AM      XR CHEST PA OR AP 1 VIEW   Final Result   Overall stable imaging appearance of the chest and support lines.       Electronically Signed by: HUGO MENDIOLA M.D.   Signed on: 10/17/2020 05:20 AM      XR CHEST PA OR AP 1 VIEW   Final Result   No significant change.       Electronically Signed by: LEMUEL ANDERSON M.D.   Signed on: 10/16/2020 06:02 AM      XR CHEST PA OR AP 1 VIEW   Final Result   FINDINGS/IMPRESSION:    Stable chest with tracheostomy.  Nearly opacified left hemithorax with three left chest tubes in position.  Mediastinal tube also noted.  No pneumothorax or significant effusion.  Bilateral subclavian line tips and ECMO catheter are stable.  Minimal    effusion or atelectasis along the right major fissure.      Electronically Signed by: COSMO MIN    Signed on: 10/15/2020 7:39 AM          XR CHEST PA OR AP 1 VIEW    Final Result   No obvious significant change from prior study.      Electronically Signed by: MARITZA MAHONEY M.D.    Signed on: 10/14/2020 2:47 PM          XR CHEST PA OR AP 1 VIEW   Final Result   1. Continued multifocal edema and/or pneumonia including near complete opacification of left hemithorax.    2. Support apparatus as above       Electronically Signed by: GISSELL VILLAGOMEZ M.D.   Signed on: 10/14/2020 05:24 AM      XR CHEST PA OR AP 1 VIEW   Final Result   Impression:    Interval placement of 2 left-sided chest tubes with significant decrease in size of left pleural effusion.      Persistent significant left perihilar and left lower lobe consolidation which may represent pulmonary hemorrhage, infection and/or aspiration changes.      Electronically Signed by: MADDI DUFF MD    Signed on: 10/13/2020 3:08 PM          XR CHEST PA OR AP 1 VIEW   Final Result   Impression:    Stable exam as above. Support devices as above.      Electronically Signed by: BRONSON SHELDON MD    Signed on: 10/13/2020 12:42 PM          XR CHEST PA OR AP 1 VIEW   Final Result   Complete opacification left hemithorax secondary to pleural effusion, unchanged.      Electronically Signed by: YOSELIN HENRY M.D.   Signed on: 10/13/2020 05:20 AM      XR CHEST PA OR AP 1 VIEW   Final Result   1.   Stable left chest whiteout related to consolidation and/or pleural effusion.   2.   Support lines as described above.      Electronically Signed by: ZOE SPIVEY MD    Signed on: 10/12/2020 2:12 PM          CT HEAD WO CONTRAST   Final Result       Expected evolution of subacute left occipital and temporal lobe infarcts.  No hemorrhagic transformation.  No new cortical infarcts.      Electronically Signed by: DAT JOSEPH M.D.    Signed on: 10/12/2020 10:55 AM          CT CHEST ABDOMEN PELVIS WO CONTRAST   Final Result   Large loculated left pleural effusion.  Left lung is near completely atelectatic except for small aerated lung in lingula.   There are scattered foci of gas noted in the left posterior hemithorax base.  This may represent a small hydropneumothorax.     Empyema is included in the differential.  Hyperdense dependent material in the left hemithorax noted.  This may represent some dependent hemorrhage.      Small right pleural effusion is seen.  Posterior right lower lobe consolidation.  Consolidation at the anterior aspect of the right lung noted.  Loculated the right posterior hemithorax fluid.        No renal calculi or hydronephrosis.  Mild bilateral perinephric stranding.        Multiple gas-filled dilated bowel loops noted especially the colon.  Appearance is suggestive of mild colonic ileus.  There is small amount of abdominal and pelvic ascites.      Mild bladder wall thickening.  Prostate gland is borderline enlarged.      Electronically Signed by: ARGELIA AYALA MD    Signed on: 10/12/2020 10:43 AM          XR CHEST PA OR AP 1 VIEW   Final Result   Large left loculated pleural effusion.  Slight increase in the left lung opacity.      Electronically Signed by: ARGELIA AYALA MD    Signed on: 10/12/2020 7:18 AM          XR CHEST PA OR AP 1 VIEW   Final Result   Stable chest radiograph.      Electronically Signed by: ARGELIA AYALA MD    Signed on: 10/11/2020 3:17 PM          XR CHEST PA OR AP 1 VIEW   Final Result   No significant change from prior examination.      Electronically Signed by: MARITZA MAHONEY M.D.    Signed on: 10/11/2020 9:58 AM          XR CHEST PA OR AP 1 VIEW   Final Result   Minimal improvement in the right lung base opacity.  Otherwise no significant change.      Electronically Signed by: ARGELIA AYALA MD    Signed on: 10/11/2020 2:27 PM                IMPRESSION:    1. 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 Left sided loculated pleural effusion s/p VATS 10/13   3. Recent h/o left sided occipital infarct  4. Pneumonia secondary to enterobacter   5. Progressive leukocytosis unclear         PLAN       1.  Continue Meropenem   2. F/u wbc trend, clinically   3. F/u Blood cxs 11/6 and repeats.        D/w rn      Shree Erickson MD  11/8/2020 4:12 PM

## 2021-03-17 ENCOUNTER — HOSPITAL ENCOUNTER (EMERGENCY)
Facility: HOSPITAL | Age: 56
Discharge: HOME | End: 2021-03-17
Attending: EMERGENCY MEDICINE
Payer: MEDICARE

## 2021-03-17 VITALS
SYSTOLIC BLOOD PRESSURE: 149 MMHG | RESPIRATION RATE: 18 BRPM | BODY MASS INDEX: 35.48 KG/M2 | WEIGHT: 194 LBS | HEART RATE: 97 BPM | DIASTOLIC BLOOD PRESSURE: 92 MMHG | OXYGEN SATURATION: 98 % | TEMPERATURE: 98.4 F

## 2021-03-17 DIAGNOSIS — B37.3 CANDIDAL VULVOVAGINITIS: ICD-10-CM

## 2021-03-17 DIAGNOSIS — L73.9 FOLLICULITIS: Primary | ICD-10-CM

## 2021-03-17 LAB
BACTERIA UR QL AUTO: ABNORMAL /HPF
BILIRUB UR QL STRIP: ABNORMAL
CLARITY UR: ABNORMAL
COLOR UR: YELLOW
EXT PREG TEST URINE: NEGATIVE
EXT. CONTROL ED NAV: NORMAL
GLUCOSE UR STRIP-MCNC: ABNORMAL MG/DL
HGB UR QL STRIP.AUTO: ABNORMAL
KETONES UR STRIP-MCNC: ABNORMAL MG/DL
LEUKOCYTE ESTERASE UR QL STRIP: NEGATIVE
MUCOUS THREADS UR QL AUTO: ABNORMAL
NITRITE UR QL STRIP: NEGATIVE
NON-SQ EPI CELLS URNS QL MICRO: ABNORMAL /HPF
OTHER STN SPEC: ABNORMAL
PH UR STRIP.AUTO: 6 [PH]
PROT UR STRIP-MCNC: ABNORMAL MG/DL
RBC #/AREA URNS AUTO: ABNORMAL /HPF
SP GR UR STRIP.AUTO: >=1.03 (ref 1–1.03)
UROBILINOGEN UR QL STRIP.AUTO: 0.2 E.U./DL
WBC #/AREA URNS AUTO: ABNORMAL /HPF

## 2021-03-17 PROCEDURE — 87147 CULTURE TYPE IMMUNOLOGIC: CPT | Performed by: EMERGENCY MEDICINE

## 2021-03-17 PROCEDURE — 81025 URINE PREGNANCY TEST: CPT | Performed by: EMERGENCY MEDICINE

## 2021-03-17 PROCEDURE — 99283 EMERGENCY DEPT VISIT LOW MDM: CPT

## 2021-03-17 PROCEDURE — 87086 URINE CULTURE/COLONY COUNT: CPT | Performed by: EMERGENCY MEDICINE

## 2021-03-17 PROCEDURE — 99284 EMERGENCY DEPT VISIT MOD MDM: CPT | Performed by: EMERGENCY MEDICINE

## 2021-03-17 PROCEDURE — 81001 URINALYSIS AUTO W/SCOPE: CPT | Performed by: EMERGENCY MEDICINE

## 2021-03-17 RX ORDER — IBUPROFEN 600 MG/1
600 TABLET ORAL ONCE
Status: COMPLETED | OUTPATIENT
Start: 2021-03-17 | End: 2021-03-17

## 2021-03-17 RX ORDER — ACETAMINOPHEN 325 MG/1
975 TABLET ORAL ONCE
Status: COMPLETED | OUTPATIENT
Start: 2021-03-17 | End: 2021-03-17

## 2021-03-17 RX ORDER — KETOCONAZOLE 20 MG/G
CREAM TOPICAL DAILY
Qty: 15 G | Refills: 0 | Status: SHIPPED | OUTPATIENT
Start: 2021-03-17 | End: 2021-03-31 | Stop reason: HOSPADM

## 2021-03-17 RX ORDER — NAPROXEN SODIUM 550 MG/1
550 TABLET ORAL 2 TIMES DAILY WITH MEALS
Qty: 20 TABLET | Refills: 0 | Status: SHIPPED | OUTPATIENT
Start: 2021-03-17 | End: 2021-03-31 | Stop reason: HOSPADM

## 2021-03-17 RX ORDER — SULFAMETHOXAZOLE AND TRIMETHOPRIM 800; 160 MG/1; MG/1
1 TABLET ORAL 2 TIMES DAILY
Qty: 14 TABLET | Refills: 0 | Status: SHIPPED | OUTPATIENT
Start: 2021-03-17 | End: 2021-03-31 | Stop reason: HOSPADM

## 2021-03-17 RX ADMIN — IBUPROFEN 600 MG: 600 TABLET, FILM COATED ORAL at 12:57

## 2021-03-17 RX ADMIN — ACETAMINOPHEN 975 MG: 325 TABLET ORAL at 12:57

## 2021-03-17 NOTE — ED NOTES
Pt was unable to provide enough urine for both a Clamydia/GC test and UA  UA and POCT Pregnacy preformed at this time   Pt provided with more water and another specimen cup and made aware of how much urine was needed for the 8 [facility] test       Robert Chester  36/03/11 3208

## 2021-03-17 NOTE — ED NOTES
Pt unable to void at this time  Pt noted to be drinking water in an attempt to provide a specimen  Pt will ring call bell after providing a specimen        Markham Blizzard  41/22/27 5007

## 2021-03-17 NOTE — ED NOTES
Patient reporting that she must leave and is unable to go to the bathroom again to provide sample for GC/Chlamydia  Discharge instructions provided, with prescriptions, expresses no questions or concerns          Zoe Houston RN  03/17/21 9834

## 2021-03-17 NOTE — ED PROVIDER NOTES
Emergency Department Note- Particia Cue 50 y o  female MRN: [id number]    Unit/Bed#: ED 29 Encounter: [id number]        History of Present Illness     68-year-old female tells me for the last 1 month she has noticed some slight irritation and swelling around her vaginal opening, and over the last week or so it has gotten a bit worse and she has had some dysuria  No hematuria, no vaginal bleeding or discharge  She was sexually active with a male partner, last intercourse was about a month or month and half ago  That preceding the onset of the symptoms  She has no abdominal pain, no fever, no chills  She does not have a gynecologist, does have a family doctor but has not sought medical attention for this prior to coming to the ED today  She said she has been putting Desitin over the area      REVIEW OF SYSTEMS     Constitutional:  No recent weight  gains or losses   Eyes:  No visual changes   ENT:  No tinnitus or hearing changes   Cardiac: No chest pain or palpitations   Respiratory:  No cough or shortness of breath   Abdominal:  No nausea or vomiting   Urinary: As per HPI   Hematologic: No easy bruising or bleeding   Skin: No rash   Musculoskeletal: No aches or pains   Neurologic: No weakness or sensory changes   Psychiatric: No mood changes      Historical Information   Past Medical History:   Diagnosis Date    [facility]     Adjustment disorder     Alcohol abuse     Alcoholism [facility]     Anxiety     Bipolar disorder [facility]     [facility]     Depression     Diabetes type 2, controlled ([facility]     Disease of thyroid gland     Gastritis     Head injury     Heart palpitations     Hyperlipidemia     Hypertension     Hypothyroidism     Psychiatric illness     PTSD (post-traumatic stress disorder)     Seizure ([facility]     Seizures ([address]     Sleep difficulties     Substance abuse ([facility]     Suicide attempt ([facility]     Withdrawal symptoms, drug or narcotic ([facility] )      Past Surgical History:   Procedure Laterality Date    ABDOMINAL SURGERY      APPENDECTOMY      BOWEL RESECTION      CHOLECYSTECTOMY      ESOPHAGOGASTRODUODENOSCOPY N/A 5/18/2018    Procedure: ESOPHAGOGASTRODUODENOSCOPY (EGD) with bx;  Surgeon: Chula Calhoun DO;  Location: [facility]; Service: Gastroenterology    HYSTERECTOMY      KNEE SURGERY Bilateral      Social History   Social History     Substance and Sexual Activity   Alcohol Use Not Currently    Alcohol/week: 6 0 standard drinks    Types: 6 Cans of beer per week    Frequency: 4 or more times a week    Drinks per session: 5 or 6    Binge frequency: Weekly    Comment: last drink on 08/15/20     Social History     Substance and Sexual Activity   Drug Use No     Social History     Tobacco Use   Smoking Status Current Every Day Smoker    Packs/day: 0 50    Years: 25 00    Pack years: 12 50    Types: Cigarettes   Smokeless Tobacco Never Used     Family History:   Family History   Problem Relation Age of Onset    Diabetes Father     Bipolar disorder Mother        MEDICATIONS:  No current facility-administered medications on file prior to encounter        Current Outpatient Medications on File Prior to Encounter   Medication Sig Dispense Refill    amLODIPine (NORVASC) 5 mg tablet Take 1 tablet (5 mg total) by mouth daily before lunch 7 tablet 0    ARIPiprazole (ABILIFY) 10 mg tablet Take 1 tablet (10 mg total) by mouth daily for 10 days 10 tablet 0    ARIPiprazole ER (ABILIFY MAINTENA) 300 MG injection Inject 300 mg into a muscle every 28 days 1 each 1    benztropine (COGENTIN) 0 5 mg tablet Take 1 tablet (0 5 mg total) by mouth 2 (two) times a day At 9am and 6pm 60 tablet 1    cloNIDine (CATAPRES) 0 1 mg tablet Take 1 tablet (0 1 mg total) by mouth daily at bedtime 30 tablet 1    gabapentin (NEURONTIN) 300 mg capsule Take 1 capsule (300 mg total) by mouth 3 (three) times a day At 9am, 4pm, 9pm 90 capsule 1    hydrOXYzine HCL (ATARAX) 25 mg tablet Take 3 tablets (75 mg total) by mouth daily at bedtime 90 tablet 1    levothyroxine 25 mcg tablet Take 1 tablet (25 mcg total) by mouth daily in the early morning 7 tablet 0    metoprolol tartrate (LOPRESSOR) 25 mg tablet Take 1 tablet (25 mg total) by mouth every 12 (twelve) hours 14 tablet 0    pantoprazole (PROTONIX) 20 mg tablet Take 1 tablet (20 mg total) by mouth daily in the early morning 7 tablet 0    sucralfate (CARAFATE) 1 g tablet Take 1 tablet (1 g total) by mouth 4 (four) times a day (before meals and at bedtime) 30 tablet 0     ALLERGIES:  Allergies   Allergen Reactions    Latex Rash       Vitals:    03/17/21 1136   BP: 149/92   TempSrc: Oral   Pulse: 97   Resp: 18   Patient Position - Orthostatic VS: Sitting   Temp: 98 4 °F (36 9 °C)       PHYSICAL EXAM    General:  Patient is well-appearing  Head:  Atraumatic  Eyes:  Conjunctiva pink  ENT:  Mucous membranes are moist  Neck:  Supple  Cardiac:  S1-S2, without murmurs  Lungs:  Clear to auscultation bilaterally  Abdomen:  Soft, nontender, normal bowel sounds, no CVA tenderness, no tympany, no rigidity, no guarding  :  Performed with female PCT Thalia there is no purulent drainage or discharge  She does have some slight erythema with scattered satellite lesions in her groin predominantly over the labia majora, which are also slightly swollen  There is no herpetic lesions  Speculum examination shows no vaginal bleeding or vaginal discharge  Bimanual examination has no cervical motion tenderness  Extremities:  Normal range of motion  Neurologic:  Awake, fluent speech, normal comprehension  AAOx3     Skin:  Pink warm and dry  Psychiatric:  Alert, pleasant, cooperative            Labs Reviewed   UA W REFLEX TO MICROSCOPIC WITH REFLEX TO CULTURE - Abnormal       Result Value Ref Range Status    Color, UA Yellow   Final    Clarity, UA Cloudy   Final    Specific Gravity, UA >=1 030  1 003 - 1 030 Final    pH, UA 6 0  4 5, 5 0, 5 5, 6 0, 6 5, 7  0, 7 5, 8 0 Final    Leukocytes, UA Negative  Negative Final    Nitrite, UA Negative  Negative Final    Protein,  (2+) (*) Negative mg/dl Final    Glucose,  (1/2%) (*) Negative mg/dl Final    Ketones, UA >=80 (3+) (*) Negative mg/dl Final    Urobilinogen, UA 0 2  0 2, 1 0 E U /dl E U /dl Final    Bilirubin, UA Interference- unable to analyze (*) Negative Final    Comment: The dipstick result may be falsely positive due to interfering substances  We recommend reliance upon serum bilirubin, liver & kidney function tests to guide patient care if clinically indicated  Blood, UA Moderate (*) Negative Final   URINE MICROSCOPIC - Abnormal    RBC, UA 10-20 (*) None Seen, 2-4 /hpf Final    WBC, UA 30-50 (*) None Seen, 2-4 /hpf Final    Epithelial Cells Moderate (*) None Seen, Occasional /hpf Final    Bacteria, UA Moderate (*) None Seen, Occasional /hpf Final    OTHER OBSERVATIONS WBCs Clumped  Trichomonas Organisms Present   Final    MUCUS THREADS Occasional (*) None Seen Final   POCT PREGNANCY, URINE - Normal    EXT PREG TEST UR (Ref: Negative) NEGATIVE   Final    Control VALID   Final   CHLAMYDIA /GC AMPLIFIED DNA   URINE CULTURE       Medications   acetaminophen (TYLENOL) tablet 975 mg (975 mg Oral Given 3/17/21 1257)   ibuprofen (MOTRIN) tablet 600 mg (600 mg Oral Given 3/17/21 1257)       No orders to display            Assessment/Plan     ED Medical Decision Making:    Patient appears to have mild Candida vulvovaginitis as well as some associated folliculitis  GC and chlamydia a urine sent and pending although history and exam suggest this is less likely          Time reflects when diagnosis was documented in both MDM as applicable and the Disposition within this note     Time User Action Codes Description Comment    3/17/2021 12:59 PM Cynthia Peat Add [Z49 1] Folliculitis     8/32/7141 12:59 PM Cynthia Peat Add [B37 3] Candidal vulvovaginitis       ED Disposition     ED Disposition Condition Date/Time Comment    Discharge Stable Wed Mar 17, 2021 12:59 PM Joana Leos discharge to home/self care              Follow-up Information     Follow up With Specialties Details Why Contact Info [facility] Schedule an appointment as soon as possible for a visit on 3/22/2021  Pritesh Cooper [id number]  [phone] [facility]  [place], [address], [place], [place], [facility]          Discharge Medication List as of 3/17/2021  1:05 PM      START taking these medications    Details   ketoconazole (NIZORAL) 2 % cream Apply topically daily Apply daily to affected areas around your vagina, Starting Wed 3/17/2021, Print      naproxen sodium (ANAPROX) 550 mg tablet Take 1 tablet (550 mg total) by mouth 2 (two) times a day with meals, Starting Wed 3/17/2021, Print      sulfamethoxazole-trimethoprim (BACTRIM DS) 800-160 mg per tablet Take 1 tablet by mouth 2 (two) times a day for 7 days, Starting Wed 3/17/2021, Until Wed 3/24/2021, Print                  Inocencio Rahman, DO  03/17/21 0994

## 2021-03-18 LAB
BACTERIA UR CULT: ABNORMAL
BACTERIA UR CULT: ABNORMAL

## 2021-03-23 ENCOUNTER — APPOINTMENT (EMERGENCY)
Dept: CT IMAGING | Facility: HOSPITAL | Age: 56
DRG: 758 | End: 2021-03-23
Payer: MEDICARE

## 2021-03-23 ENCOUNTER — HOSPITAL ENCOUNTER (INPATIENT)
Facility: HOSPITAL | Age: 56
LOS: 8 days | Discharge: HOME | DRG: 758 | End: 2021-03-31
Attending: EMERGENCY MEDICINE | Admitting: INTERNAL MEDICINE
Payer: MEDICARE

## 2021-03-23 DIAGNOSIS — E11.9 TYPE 2 DIABETES MELLITUS WITHOUT COMPLICATION, WITHOUT LONG-TERM CURRENT USE OF INSULIN (HCC): ICD-10-CM

## 2021-03-23 DIAGNOSIS — E11.9 DIABETES (HCC): ICD-10-CM

## 2021-03-23 DIAGNOSIS — T73.0XXA STARVATION KETOACIDOSIS: ICD-10-CM

## 2021-03-23 DIAGNOSIS — F41.9 ANXIETY: ICD-10-CM

## 2021-03-23 DIAGNOSIS — F31.4 BIPOLAR DISORDER, CURRENT EPISODE DEPRESSED, SEVERE, WITHOUT PSYCHOTIC FEATURES (HCC): ICD-10-CM

## 2021-03-23 DIAGNOSIS — E87.6 HYPOKALEMIA: ICD-10-CM

## 2021-03-23 DIAGNOSIS — R45.851 SUICIDAL IDEATION: Primary | ICD-10-CM

## 2021-03-23 DIAGNOSIS — R00.0 TACHYCARDIA: ICD-10-CM

## 2021-03-23 DIAGNOSIS — E80.6 HYPERBILIRUBINEMIA: ICD-10-CM

## 2021-03-23 DIAGNOSIS — E87.1 HYPONATREMIA: ICD-10-CM

## 2021-03-23 DIAGNOSIS — E87.2 STARVATION KETOACIDOSIS: ICD-10-CM

## 2021-03-23 DIAGNOSIS — R74.01 TRANSAMINITIS: ICD-10-CM

## 2021-03-23 DIAGNOSIS — N76.0 VAGINAL INFECTION: ICD-10-CM

## 2021-03-23 PROBLEM — E87.29 INCREASED ANION GAP METABOLIC ACIDOSIS: Status: ACTIVE | Noted: 2021-03-23

## 2021-03-23 PROBLEM — N39.0 UTI (URINARY TRACT INFECTION): Status: ACTIVE | Noted: 2021-03-23

## 2021-03-23 LAB
ALBUMIN SERPL BCP-MCNC: 3.1 G/DL (ref 3.5–5)
ALP SERPL-CCNC: 214 U/L (ref 46–116)
ALT SERPL W P-5'-P-CCNC: 1399 U/L (ref 12–78)
AMPHETAMINES SERPL QL SCN: NEGATIVE
ANION GAP SERPL CALCULATED.3IONS-SCNC: 17 MMOL/L (ref 4–13)
ANION GAP SERPL CALCULATED.3IONS-SCNC: 21 MMOL/L (ref 4–13)
APAP SERPL-MCNC: <2 UG/ML (ref 10–20)
AST SERPL W P-5'-P-CCNC: 58 U/L (ref 5–45)
BACTERIA UR QL AUTO: ABNORMAL /HPF
BARBITURATES UR QL: NEGATIVE
BASE EX.OXY STD BLDV CALC-SCNC: 84.3 % (ref 60–80)
BASE EXCESS BLDV CALC-SCNC: -8 MMOL/L
BASOPHILS # BLD MANUAL: 0 THOUSAND/UL (ref 0–0.1)
BASOPHILS NFR MAR MANUAL: 0 % (ref 0–1)
BENZODIAZ UR QL: NEGATIVE
BETA-HYDROXYBUTYRATE: 5.3 MMOL/L
BILIRUB SERPL-MCNC: 1.22 MG/DL (ref 0.2–1)
BILIRUB UR QL STRIP: ABNORMAL
BUN SERPL-MCNC: 4 MG/DL (ref 5–25)
BUN SERPL-MCNC: 4 MG/DL (ref 5–25)
CALCIUM ALBUM COR SERPL-MCNC: 9.9 MG/DL (ref 8.3–10.1)
CALCIUM SERPL-MCNC: 8.5 MG/DL (ref 8.3–10.1)
CALCIUM SERPL-MCNC: 9.2 MG/DL (ref 8.3–10.1)
CHLORIDE SERPL-SCNC: 95 MMOL/L (ref 100–108)
CHLORIDE SERPL-SCNC: 99 MMOL/L (ref 100–108)
CLARITY UR: ABNORMAL
CO2 SERPL-SCNC: 16 MMOL/L (ref 21–32)
CO2 SERPL-SCNC: 17 MMOL/L (ref 21–32)
COCAINE UR QL: NEGATIVE
COLOR UR: YELLOW
CREAT SERPL-MCNC: 0.76 MG/DL (ref 0.6–1.3)
CREAT SERPL-MCNC: 0.8 MG/DL (ref 0.6–1.3)
EOSINOPHIL # BLD MANUAL: 0.25 THOUSAND/UL (ref 0–0.4)
EOSINOPHIL NFR BLD MANUAL: 3 % (ref 0–6)
ERYTHROCYTE [DISTWIDTH] IN BLOOD BY AUTOMATED COUNT: 15.5 % (ref 11.6–15.1)
ETHANOL EXG-MCNC: 0 MG/DL
ETHANOL SERPL-MCNC: <3 MG/DL (ref 0–3)
FLUAV RNA RESP QL NAA+PROBE: NEGATIVE
FLUBV RNA RESP QL NAA+PROBE: NEGATIVE
GFR SERPL CREATININE-BSD FRML MDRD: 84 ML/MIN/1.73SQ M
GFR SERPL CREATININE-BSD FRML MDRD: 90 ML/MIN/1.73SQ M
GLUCOSE SERPL-MCNC: 232 MG/DL (ref 65–140)
GLUCOSE SERPL-MCNC: 242 MG/DL (ref 65–140)
GLUCOSE UR STRIP-MCNC: ABNORMAL MG/DL
HCO3 BLDV-SCNC: 17.2 MMOL/L (ref 24–30)
HCT VFR BLD AUTO: 39.2 % (ref 34.8–46.1)
HGB BLD-MCNC: 12.5 G/DL (ref 11.5–15.4)
HGB UR QL STRIP.AUTO: ABNORMAL
KETONES UR STRIP-MCNC: ABNORMAL MG/DL
LEUKOCYTE ESTERASE UR QL STRIP: ABNORMAL
LYMPHOCYTES # BLD AUTO: 2.14 THOUSAND/UL (ref 0.6–4.47)
LYMPHOCYTES # BLD AUTO: 26 % (ref 14–44)
MCH RBC QN AUTO: 31.3 PG (ref 26.8–34.3)
MCHC RBC AUTO-ENTMCNC: 31.9 G/DL (ref 31.4–37.4)
MCV RBC AUTO: 98 FL (ref 82–98)
METHADONE UR QL: NEGATIVE
MONOCYTES # BLD AUTO: 1.23 THOUSAND/UL (ref 0–1.22)
MONOCYTES NFR BLD: 15 % (ref 4–12)
MYELOCYTES NFR BLD MANUAL: 6 % (ref 0–1)
NEUTROPHILS # BLD MANUAL: 4.12 THOUSAND/UL (ref 1.85–7.62)
NEUTS BAND NFR BLD MANUAL: 3 % (ref 0–8)
NEUTS SEG NFR BLD AUTO: 47 % (ref 43–75)
NITRITE UR QL STRIP: NEGATIVE
NON-SQ EPI CELLS URNS QL MICRO: ABNORMAL /HPF
NRBC BLD AUTO-RTO: 0 /100 WBCS
O2 CT BLDV-SCNC: 14.1 ML/DL
OPIATES UR QL SCN: NEGATIVE
OTHER STN SPEC: ABNORMAL
OXYCODONE+OXYMORPHONE UR QL SCN: NEGATIVE
PCO2 BLDV: 34.3 MM HG (ref 42–50)
PCP UR QL: NEGATIVE
PH BLDV: 7.32 [PH] (ref 7.3–7.4)
PH UR STRIP.AUTO: 6 [PH] (ref 4.5–8)
PLATELET # BLD AUTO: 338 THOUSANDS/UL (ref 149–390)
PLATELET BLD QL SMEAR: ADEQUATE
PMV BLD AUTO: 9.4 FL (ref 8.9–12.7)
PO2 BLDV: 52 MM HG (ref 35–45)
POTASSIUM SERPL-SCNC: 3.1 MMOL/L (ref 3.5–5.3)
POTASSIUM SERPL-SCNC: 3.2 MMOL/L (ref 3.5–5.3)
PROT SERPL-MCNC: 7.3 G/DL (ref 6.4–8.2)
PROT UR STRIP-MCNC: ABNORMAL MG/DL
RBC # BLD AUTO: 3.99 MILLION/UL (ref 3.81–5.12)
RBC #/AREA URNS AUTO: ABNORMAL /HPF
RBC MORPH BLD: NORMAL
RSV RNA RESP QL NAA+PROBE: NEGATIVE
SALICYLATES SERPL-MCNC: 6 MG/DL (ref 3–20)
SARS-COV-2 RNA RESP QL NAA+PROBE: NEGATIVE
SODIUM SERPL-SCNC: 132 MMOL/L (ref 136–145)
SODIUM SERPL-SCNC: 133 MMOL/L (ref 136–145)
SP GR UR STRIP.AUTO: >=1.03 (ref 1–1.03)
THC UR QL: NEGATIVE
TOTAL CELLS COUNTED SPEC: 100
UROBILINOGEN UR QL STRIP.AUTO: 1 E.U./DL
WBC # BLD AUTO: 8.23 THOUSAND/UL (ref 4.31–10.16)
WBC #/AREA URNS AUTO: ABNORMAL /HPF

## 2021-03-23 PROCEDURE — 80143 DRUG ASSAY ACETAMINOPHEN: CPT | Performed by: PHYSICIAN ASSISTANT

## 2021-03-23 PROCEDURE — 36415 COLL VENOUS BLD VENIPUNCTURE: CPT | Performed by: PHYSICIAN ASSISTANT

## 2021-03-23 PROCEDURE — 74177 CT ABD & PELVIS W/CONTRAST: CPT

## 2021-03-23 PROCEDURE — 0241U HB NFCT DS VIR RESP RNA 4 TRGT: CPT | Performed by: PHYSICIAN ASSISTANT

## 2021-03-23 PROCEDURE — 99285 EMERGENCY DEPT VISIT HI MDM: CPT

## 2021-03-23 PROCEDURE — 96360 HYDRATION IV INFUSION INIT: CPT

## 2021-03-23 PROCEDURE — 99285 EMERGENCY DEPT VISIT HI MDM: CPT | Performed by: PHYSICIAN ASSISTANT

## 2021-03-23 PROCEDURE — 82010 KETONE BODYS QUAN: CPT | Performed by: PHYSICIAN ASSISTANT

## 2021-03-23 PROCEDURE — 82805 BLOOD GASES W/O2 SATURATION: CPT | Performed by: PHYSICIAN ASSISTANT

## 2021-03-23 PROCEDURE — 81002 URINALYSIS NONAUTO W/O SCOPE: CPT | Performed by: PHYSICIAN ASSISTANT

## 2021-03-23 PROCEDURE — 85007 BL SMEAR W/DIFF WBC COUNT: CPT | Performed by: PHYSICIAN ASSISTANT

## 2021-03-23 PROCEDURE — 80053 COMPREHEN METABOLIC PANEL: CPT | Performed by: PHYSICIAN ASSISTANT

## 2021-03-23 PROCEDURE — 85027 COMPLETE CBC AUTOMATED: CPT | Performed by: PHYSICIAN ASSISTANT

## 2021-03-23 PROCEDURE — 80307 DRUG TEST PRSMV CHEM ANLYZR: CPT | Performed by: PHYSICIAN ASSISTANT

## 2021-03-23 PROCEDURE — 96361 HYDRATE IV INFUSION ADD-ON: CPT

## 2021-03-23 PROCEDURE — 82075 ASSAY OF BREATH ETHANOL: CPT | Performed by: PHYSICIAN ASSISTANT

## 2021-03-23 PROCEDURE — 82948 REAGENT STRIP/BLOOD GLUCOSE: CPT

## 2021-03-23 PROCEDURE — 99223 1ST HOSP IP/OBS HIGH 75: CPT | Performed by: PHYSICIAN ASSISTANT

## 2021-03-23 PROCEDURE — 82077 ASSAY SPEC XCP UR&BREATH IA: CPT | Performed by: PHYSICIAN ASSISTANT

## 2021-03-23 PROCEDURE — 93005 ELECTROCARDIOGRAM TRACING: CPT

## 2021-03-23 PROCEDURE — 81001 URINALYSIS AUTO W/SCOPE: CPT

## 2021-03-23 PROCEDURE — 80048 BASIC METABOLIC PNL TOTAL CA: CPT | Performed by: PHYSICIAN ASSISTANT

## 2021-03-23 PROCEDURE — 80179 DRUG ASSAY SALICYLATE: CPT | Performed by: PHYSICIAN ASSISTANT

## 2021-03-23 PROCEDURE — 87086 URINE CULTURE/COLONY COUNT: CPT

## 2021-03-23 RX ORDER — ONDANSETRON 2 MG/ML
4 INJECTION INTRAMUSCULAR; INTRAVENOUS EVERY 6 HOURS PRN
Status: DISCONTINUED | OUTPATIENT
Start: 2021-03-23 | End: 2021-03-31 | Stop reason: HOSPADM

## 2021-03-23 RX ORDER — HYDROXYZINE HYDROCHLORIDE 25 MG/1
25 TABLET, FILM COATED ORAL 2 TIMES DAILY PRN
Status: DISCONTINUED | OUTPATIENT
Start: 2021-03-23 | End: 2021-03-25

## 2021-03-23 RX ORDER — LORAZEPAM 1 MG/1
1 TABLET ORAL ONCE
Status: COMPLETED | OUTPATIENT
Start: 2021-03-23 | End: 2021-03-23

## 2021-03-23 RX ORDER — IBUPROFEN 400 MG/1
400 TABLET ORAL EVERY 6 HOURS PRN
Status: DISCONTINUED | OUTPATIENT
Start: 2021-03-23 | End: 2021-03-31 | Stop reason: HOSPADM

## 2021-03-23 RX ORDER — TRAZODONE HYDROCHLORIDE 100 MG/1
200 TABLET ORAL
COMMUNITY
End: 2021-12-09 | Stop reason: SDUPTHER

## 2021-03-23 RX ORDER — ESCITALOPRAM OXALATE 5 MG/1
5 TABLET ORAL DAILY
COMMUNITY
End: 2021-03-23 | Stop reason: CLARIF

## 2021-03-23 RX ORDER — DULOXETIN HYDROCHLORIDE 30 MG/1
30 CAPSULE, DELAYED RELEASE ORAL DAILY
COMMUNITY
End: 2022-01-03 | Stop reason: SDUPTHER

## 2021-03-23 RX ORDER — DEXTROSE AND SODIUM CHLORIDE 5; .9 G/100ML; G/100ML
75 INJECTION, SOLUTION INTRAVENOUS CONTINUOUS
Status: DISCONTINUED | OUTPATIENT
Start: 2021-03-23 | End: 2021-03-26

## 2021-03-23 RX ORDER — GABAPENTIN 400 MG/1
800 CAPSULE ORAL 3 TIMES DAILY
Status: DISCONTINUED | OUTPATIENT
Start: 2021-03-23 | End: 2021-03-31 | Stop reason: HOSPADM

## 2021-03-23 RX ORDER — SENNOSIDES 8.6 MG
1 TABLET ORAL
Status: DISCONTINUED | OUTPATIENT
Start: 2021-03-23 | End: 2021-03-27

## 2021-03-23 RX ORDER — TRAZODONE HYDROCHLORIDE 100 MG/1
200 TABLET ORAL
Status: DISCONTINUED | OUTPATIENT
Start: 2021-03-23 | End: 2021-03-31 | Stop reason: HOSPADM

## 2021-03-23 RX ORDER — POTASSIUM CHLORIDE 20 MEQ/1
20 TABLET, EXTENDED RELEASE ORAL ONCE
Status: COMPLETED | OUTPATIENT
Start: 2021-03-23 | End: 2021-03-23

## 2021-03-23 RX ORDER — FOLIC ACID 1 MG/1
TABLET ORAL DAILY
COMMUNITY

## 2021-03-23 RX ORDER — METRONIDAZOLE 500 MG/1
500 TABLET ORAL EVERY 12 HOURS SCHEDULED
Status: COMPLETED | OUTPATIENT
Start: 2021-03-23 | End: 2021-03-30

## 2021-03-23 RX ORDER — CLONIDINE HYDROCHLORIDE 0.1 MG/1
0.1 TABLET ORAL
Status: DISCONTINUED | OUTPATIENT
Start: 2021-03-23 | End: 2021-03-31 | Stop reason: HOSPADM

## 2021-03-23 RX ORDER — DULOXETIN HYDROCHLORIDE 30 MG/1
30 CAPSULE, DELAYED RELEASE ORAL DAILY
Status: DISCONTINUED | OUTPATIENT
Start: 2021-03-23 | End: 2021-03-31 | Stop reason: HOSPADM

## 2021-03-23 RX ORDER — CALCIUM CARBONATE 200(500)MG
1000 TABLET,CHEWABLE ORAL DAILY PRN
Status: DISCONTINUED | OUTPATIENT
Start: 2021-03-23 | End: 2021-03-31 | Stop reason: HOSPADM

## 2021-03-23 RX ORDER — FOLIC ACID 1 MG/1
1 TABLET ORAL DAILY
Status: DISCONTINUED | OUTPATIENT
Start: 2021-03-23 | End: 2021-03-31 | Stop reason: HOSPADM

## 2021-03-23 RX ORDER — SULFAMETHOXAZOLE AND TRIMETHOPRIM 800; 160 MG/1; MG/1
1 TABLET ORAL 2 TIMES DAILY
Status: DISCONTINUED | OUTPATIENT
Start: 2021-03-23 | End: 2021-03-24

## 2021-03-23 RX ORDER — LITHIUM CARBONATE 150 MG/1
150 CAPSULE ORAL
COMMUNITY
End: 2021-03-23 | Stop reason: CLARIF

## 2021-03-23 RX ORDER — NICOTINE 21 MG/24HR
1 PATCH, TRANSDERMAL 24 HOURS TRANSDERMAL DAILY
Status: DISCONTINUED | OUTPATIENT
Start: 2021-03-24 | End: 2021-03-31 | Stop reason: HOSPADM

## 2021-03-23 RX ORDER — LEVOTHYROXINE SODIUM 0.03 MG/1
25 TABLET ORAL
Status: DISCONTINUED | OUTPATIENT
Start: 2021-03-24 | End: 2021-03-31 | Stop reason: HOSPADM

## 2021-03-23 RX ORDER — METOPROLOL TARTRATE 50 MG/1
50 TABLET, FILM COATED ORAL EVERY 12 HOURS SCHEDULED
Status: DISCONTINUED | OUTPATIENT
Start: 2021-03-23 | End: 2021-03-31 | Stop reason: HOSPADM

## 2021-03-23 RX ADMIN — SODIUM CHLORIDE 1000 ML: 0.9 INJECTION, SOLUTION INTRAVENOUS at 20:27

## 2021-03-23 RX ADMIN — IOHEXOL 100 ML: 350 INJECTION, SOLUTION INTRAVENOUS at 21:30

## 2021-03-23 RX ADMIN — DULOXETINE HYDROCHLORIDE 30 MG: 30 CAPSULE, DELAYED RELEASE ORAL at 16:56

## 2021-03-23 RX ADMIN — SULFAMETHOXAZOLE AND TRIMETHOPRIM 1 TABLET: 800; 160 TABLET ORAL at 17:31

## 2021-03-23 RX ADMIN — FOLIC ACID 1 MG: 1 TABLET ORAL at 16:57

## 2021-03-23 RX ADMIN — GABAPENTIN 800 MG: 400 CAPSULE ORAL at 16:58

## 2021-03-23 RX ADMIN — POTASSIUM CHLORIDE 20 MEQ: 1500 TABLET, EXTENDED RELEASE ORAL at 20:32

## 2021-03-23 RX ADMIN — TRAZODONE HYDROCHLORIDE 200 MG: 100 TABLET ORAL at 22:01

## 2021-03-23 RX ADMIN — DEXTROSE AND SODIUM CHLORIDE 125 ML/HR: 5; .9 INJECTION, SOLUTION INTRAVENOUS at 21:50

## 2021-03-23 RX ADMIN — HYDROXYZINE HYDROCHLORIDE 25 MG: 25 TABLET, FILM COATED ORAL at 17:31

## 2021-03-23 RX ADMIN — LORAZEPAM 1 MG: 1 TABLET ORAL at 10:15

## 2021-03-23 RX ADMIN — GABAPENTIN 800 MG: 400 CAPSULE ORAL at 22:01

## 2021-03-23 NOTE — ED NOTES
Pt gave urine sample, enough urine for preg but not enough for urine dip       Turner Hall V  03/23/21 9568

## 2021-03-23 NOTE — ED NOTES
Call from [place] at [facility], he requested CBC, CMP and EKG before pt can be reviewed  Noted pt has been denied previously for abnormal labs  Maru Mccray aware

## 2021-03-23 NOTE — ED NOTES
Pt was given atarax for anxiety, pt is pacing around the room      [place], [place]  03/23/21 4328

## 2021-03-23 NOTE — ED PROVIDER NOTES
History  Chief Complaint   Patient presents with    Psychiatric Evaluation     Pt  reports increasing anxiety with thoughts of SI  Pt  has a plan to take pills to overdose but reports she wont do it because she is here  Pt  denes HI/VH/AH      55y  o female with PMH of drug addiction, adjustment disorder, alcohol abuse, anxiety, bipolar, bowel obstruction, depression, DM, HLD, HTN, hypothyroidism, PTSD, seizure and suicide attempt presents to the ER for psychiatric evaluation  Patient states she feels very anxious and she is not sure why  She also has been having suicidal ideations with a plan to overdose on pills  She denies HI, AH or VH  She states she is taking her medications as prescribed but thinks she needs her medications adjusted  She follows with a psychiatrist and therapist through [address] [place] [address] who she sees once a month  She states her last admission was a few months ago at [facility]  She denies drug, alcohol or tobacco use  She does not have a job  She lives with her son  She denies fever, chills, URI symptoms, chest pain, dyspnea, N/V/D, abdominal pain, weakness or paresthesias  History provided by:  Patient   used: No        Prior to Admission Medications   Prescriptions Last Dose Informant Patient Reported? Taking?    ARIPiprazole (ABILIFY) 10 mg tablet   No No   Sig: Take 1 tablet (10 mg total) by mouth daily for 10 days   ARIPiprazole ER (ABILIFY MAINTENA) 300 MG injection Unknown at Unknown time  No No   Sig: Inject 300 mg into a muscle every 28 days   DULoxetine (CYMBALTA) 30 mg delayed release capsule   Yes No   Sig: Take 30 mg by mouth daily   ERYTHROMYCIN OP Unknown at Unknown time  Yes No   Sig: Apply to eye   amLODIPine (NORVASC) 5 mg tablet   No No   Sig: Take 1 tablet (5 mg total) by mouth daily before lunch   benztropine (COGENTIN) 0 5 mg tablet Unknown at Unknown time  No No   Sig: Take 1 tablet (0 5 mg total) by mouth 2 (two) times a day At 9am and 6pm   Patient taking differently: Take 1 mg by mouth 2 (two) times a day    cloNIDine (CATAPRES) 0 1 mg tablet Unknown at Unknown time  No No   Sig: Take 1 tablet (0 1 mg total) by mouth daily at bedtime   escitalopram (LEXAPRO) 5 mg tablet Not Taking at Unknown time  Yes No   Sig: Take 5 mg by mouth daily   folic acid (FOLVITE) 1 mg tablet   Yes No   Sig: Take by mouth daily   gabapentin (NEURONTIN) 300 mg capsule  Pharmacy (Specify) No Yes   Sig: Take 1 capsule (300 mg total) by mouth 3 (three) times a day At 9am, 4pm, 9pm   Patient taking differently: Take 800 mg by mouth 3 (three) times a day    hydrOXYzine HCL (ATARAX) 25 mg tablet Unknown at Unknown time  No No   Sig: Take 3 tablets (75 mg total) by mouth daily at bedtime   Patient taking differently: Take 25 mg by mouth 2 (two) times a day as needed    ketoconazole (NIZORAL) 2 % cream   No Yes   Sig: Apply topically daily Apply daily to affected areas around your vagina   levothyroxine 25 mcg tablet Unknown at Unknown time  No No   Sig: Take 1 tablet (25 mcg total) by mouth daily in the early morning   lithium carbonate 150 mg capsule Not Taking at Unknown time  Yes No   Sig: Take 150 mg by mouth 3 (three) times a day with meals   lurasidone (Latuda) 120 mg tablet Unknown at Unknown time  Yes No   Sig: Take 120 mg by mouth daily with breakfast    lurasidone (Latuda) 80 mg tablet Not Taking at Unknown time  Yes No   Sig: Take 20 mg by mouth daily with dinner   metoprolol tartrate (LOPRESSOR) 25 mg tablet   No No   Sig: Take 1 tablet (25 mg total) by mouth every 12 (twelve) hours   Patient taking differently: Take 50 mg by mouth every 12 (twelve) hours    naproxen sodium (ANAPROX) 550 mg tablet   No No   Sig: Take 1 tablet (550 mg total) by mouth 2 (two) times a day with meals   pantoprazole (PROTONIX) 20 mg tablet   No No   Sig: Take 1 tablet (20 mg total) by mouth daily in the early morning   sucralfate (CARAFATE) 1 g tablet Not Taking at Unknown time  No No   Sig: Take 1 tablet (1 g total) by mouth 4 (four) times a day (before meals and at bedtime)   Patient not taking: Reported on 3/23/2021   sulfamethoxazole-trimethoprim (BACTRIM DS) 800-160 mg per tablet   No No   Sig: Take 1 tablet by mouth 2 (two) times a day for 7 days   traZODone (DESYREL) 100 mg tablet  Pharmacy (Specify) Yes Yes   Sig: Take 200 mg by mouth daily at bedtime      Facility-Administered Medications: None       Past Medical History:   Diagnosis Date    Addiction to drug ([place] [address] )     Adjustment disorder     Alcohol abuse     Alcoholism ([place] [address] )     Anxiety     Bipolar disorder ([place] [address] )     Bowel obstruction ([place] [address] )     Depression     Diabetes type 2, controlled ([place] [address] )     Disease of thyroid gland     Gastritis     Head injury     Heart palpitations     Hyperlipidemia     Hypertension     Hypothyroidism     Psychiatric illness     PTSD (post-traumatic stress disorder)     Seizure ([place] [address] )     Seizures ([place] [address] )     Sleep difficulties     Substance abuse ([place] [address] )     Suicide attempt ([place] [address] )     Withdrawal symptoms, drug or narcotic ([place] [address] )        Past Surgical History:   Procedure Laterality Date    ABDOMINAL SURGERY      APPENDECTOMY      BOWEL RESECTION      CHOLECYSTECTOMY      ESOPHAGOGASTRODUODENOSCOPY N/A 5/18/2018    Procedure: ESOPHAGOGASTRODUODENOSCOPY (EGD) with bx;  Surgeon: Donn Chaudhari DO;  Location: [facility]; Service: Gastroenterology    HYSTERECTOMY      KNEE SURGERY Bilateral        Family History   Problem Relation Age of Onset    Diabetes Father     Bipolar disorder Mother      I have reviewed and agree with the history as documented      E-Cigarette/Vaping    E-Cigarette Use Never User      E-Cigarette/Vaping Substances    Nicotine No     THC No     CBD No     Flavoring No     Other No     Unknown No      Social History     Tobacco Use    Smoking status: Current Every Day Smoker     Packs/day: 0 50     Years: 25 00     Pack years: 12 50     Types: Cigarettes    Smokeless tobacco: Never Used   Substance Use Topics    Alcohol use: Not Currently     Alcohol/week: 6 0 standard drinks     Types: 6 Cans of beer per week     Frequency: 4 or more times a week     Drinks per session: 5 or 6     Binge frequency: Weekly     Comment: last drink on 08/15/20    Drug use: No       Review of Systems   Constitutional: Negative for activity change, appetite change, chills and fever  HENT: Negative for congestion, drooling, ear discharge, ear pain, facial swelling, rhinorrhea and sore throat  Eyes: Negative for redness  Respiratory: Negative for cough and shortness of breath  Cardiovascular: Negative for chest pain  Gastrointestinal: Negative for abdominal pain, diarrhea, nausea and vomiting  Musculoskeletal: Negative for neck stiffness  Skin: Negative for rash  Allergic/Immunologic: Negative for food allergies  Neurological: Negative for weakness and numbness  Psychiatric/Behavioral: Positive for suicidal ideas  Negative for hallucinations  The patient is nervous/anxious  Physical Exam  Physical Exam  Vitals signs and nursing note reviewed  Constitutional:       General: She is not in acute distress  Appearance: She is not toxic-appearing  HENT:      Head: Normocephalic and atraumatic  Eyes:      Conjunctiva/sclera: Conjunctivae normal    Neck:      Musculoskeletal: Normal range of motion and neck supple  Trachea: No tracheal deviation  Cardiovascular:      Rate and Rhythm: Normal rate and regular rhythm  Heart sounds: Normal heart sounds, S1 normal and S2 normal  No murmur  No friction rub  No gallop  Pulmonary:      Effort: Pulmonary effort is normal  No respiratory distress  Breath sounds: Normal breath sounds  No decreased breath sounds, wheezing, rhonchi or rales  Chest:      Chest wall: No tenderness  Abdominal:      General: Bowel sounds are normal  There is no distension  Palpations: Abdomen is soft  Tenderness:  There is no abdominal tenderness  There is no guarding or rebound  Skin:     General: Skin is warm and dry  Findings: No rash  Neurological:      Mental Status: She is alert  GCS: GCS eye subscore is 4  GCS verbal subscore is 5  GCS motor subscore is 6  Psychiatric:         Attention and Perception: She does not perceive auditory or visual hallucinations  Mood and Affect: Mood is anxious  Speech: Speech normal          Behavior: Behavior is cooperative  Thought Content: Thought content includes suicidal ideation  Thought content does not include homicidal ideation  Thought content includes suicidal plan  Thought content does not include homicidal plan           Vital Signs  ED Triage Vitals [03/23/21 0907]   Temperature Pulse Respirations Blood Pressure SpO2   98 2 °F (36 8 °C) 95 18 167/87 100 %      Temp Source Heart Rate Source Patient Position - Orthostatic VS BP Location FiO2 (%)   Oral Monitor Sitting Right arm --      Pain Score       --           Vitals:    03/23/21 0907 03/23/21 1403   BP: 167/87 132/72   Pulse: 95 98   Patient Position - Orthostatic VS: Sitting Lying         Visual Acuity      ED Medications  Medications   traZODone (DESYREL) tablet 200 mg (has no administration in time range)   sulfamethoxazole-trimethoprim (BACTRIM DS) 800-160 mg per tablet 1 tablet (has no administration in time range)   gabapentin (NEURONTIN) capsule 800 mg (has no administration in time range)   hydrOXYzine HCL (ATARAX) tablet 25 mg (has no administration in time range)   folic acid (FOLVITE) tablet 1 mg (has no administration in time range)   DULoxetine (CYMBALTA) delayed release capsule 30 mg (has no administration in time range)   LORazepam (ATIVAN) tablet 1 mg (1 mg Oral Given 3/23/21 1015)       Diagnostic Studies  Results Reviewed     Procedure Component Value Units Date/Time    Rapid drug screen, urine [[id number]]  (Normal) Collected: 03/23/21 1411    Lab Status: Final result Specimen: Urine, Clean Catch Updated: 03/23/21 1444     Amph/Meth UR Negative     Barbiturate Ur Negative     Benzodiazepine Urine Negative     Cocaine Urine Negative     Methadone Urine Negative     Opiate Urine Negative     PCP Ur Negative     THC Urine Negative     Oxycodone Urine Negative    Narrative:      FOR MEDICAL PURPOSES ONLY  IF CONFIRMATION NEEDED PLEASE CONTACT THE LAB WITHIN 5 DAYS  Drug Screen Cutoff Levels:  AMPHETAMINE/METHAMPHETAMINES  1000 ng/mL  BARBITURATES     200 ng/mL  BENZODIAZEPINES     200 ng/mL  COCAINE      300 ng/mL  METHADONE      300 ng/mL  OPIATES      300 ng/mL  PHENCYCLIDINE     25 ng/mL  THC       50 ng/mL  OXYCODONE      100 ng/mL    COVID19, Influenza A/B, RSV PCR, [facility] [[id number]]  (Normal) Collected: 03/23/21 1001    Lab Status: Final result Specimen: Nasopharyngeal Swab Updated: 03/23/21 1116     SARS-CoV-2 Negative     INFLUENZA A PCR Negative     INFLUENZA B PCR Negative     RSV PCR Negative    Narrative: This test has been authorized by FDA under an EUA (Emergency Use Assay) for use by authorized laboratories  Clinical caution and judgement should be used with the interpretation of these results with consideration of the clinical impression and other laboratory testing  Testing reported as "Positive" or "Negative" has been proven to be accurate according to standard laboratory validation requirements  All testing is performed with control materials showing appropriate reactivity at standard intervals  POCT alcohol breath test [[id number]]  (Normal) Resulted: 03/23/21 0957    Lab Status: Final result Updated: 03/23/21 0957     EXTBreath Alcohol 0                 No orders to display              Procedures  Procedures         ED Course  ED Course as of Mar 23 1612   Tue Mar 23, 2021   1509 201 signed                                  SBIRT 22yo+      Most Recent Value   SBIRT (22 yo +)   In order to provide better care to our patients, we are screening all of our patients for alcohol and drug use  Would it be okay to ask you these screening questions? No Filed at: 03/23/2021 1004                    MDM  Number of Diagnoses or Management Options  Anxiety: new and requires workup  Suicidal ideation: new and requires workup  Diagnosis management comments: DDX consists of but not limited to: suicidal, depression, anxiety    Will check BAT, UDS and covid  Will have Crisis see patient  Patient was seen by Crisis  201 signed  Awaiting placement  Patient signed out to [facility] awaiting placement  Patient stable  Amount and/or Complexity of Data Reviewed  Clinical lab tests: ordered and reviewed  Review and summarize past medical records: yes  Discuss the patient with other providers: yes    Patient Progress  Patient progress: stable      Disposition  Final diagnoses:   Suicidal ideation   Anxiety     Time reflects when diagnosis was documented in both MDM as applicable and the Disposition within this note     Time User Action Codes Description Comment    3/23/2021  3:10 PM Donald Diaz A Add [L14 [id number]] Suicidal ideation     3/23/2021  3:10 PM Donald Diaz A Add [F41 9] Anxiety       ED Disposition     ED Disposition Condition Date/Time Comment    Transfer to [address] [place] [address] Mar 23, 2021  3:10 PM Martin Chun should be transferred out to      and has been medically cleared  MD Documentation      Most Recent Value   Sending MD Dr Lion Mosquera    None         Patient's Medications   Discharge Prescriptions    No medications on file     No discharge procedures on file      PDMP Review       Value Time User    PDMP Reviewed  Yes 9/7/2020  9:21 AM Donald Navarro MD          ED Provider  Electronically Signed by           Maricruz Jim PA-C  03/23/21 [phone]

## 2021-03-23 NOTE — ED NOTES
Harman Chaudhari RN spoke with patient's pharmacy at this time, pharmacy is going to fax over medication list       Randy Mae RN  03/23/21 1029

## 2021-03-23 NOTE — ED NOTES
SOO Nuñez indicates:   Medicare A&B  Medicaid is not managed; fee for service  Medicaid ID: [id number]

## 2021-03-23 NOTE — ED NOTES
Patient reports she needs her medications adjusted  She does not admit suicidal ideation to me however she expressed suicidal ideations with plan to overdose  She says she needs to be inpatient because she feels off and her medications arent helping

## 2021-03-23 NOTE — ED NOTES
There are no [facility] in-network beds per Jesus Agee, Intake    No beds at [facility], [facility], [employer], [facility], [place], [place], [facility], [facility], Juliet Springer with [facility] reported he has one bed, but will review; 201 and chart faxed    [facility] [place] will review; 61 51 81 and chart faxed

## 2021-03-23 NOTE — ED CARE HANDOFF
Emergency Department Sign Out Note        Sign out and transfer of care from [place], [place]  See Separate Emergency Department note  The patient, Orville Rand, was evaluated by the previous provider for psychiatric evaluation  Patient has a history of drug addiction, adjustment disorder, alcohol abuse, anxiety, bipolar, depression, DM, HLD, HTN, hypothyroid, seizure disorder and suicide attempts  Patient has been having SI with plan to overdose on pills  Her last [facility] admission was several months ago at [facility]  Workup Completed:  Results Reviewed     Procedure Component Value Units Date/Time    POCT urinalysis dipstick [[id number]]  (Abnormal) Resulted: 03/23/21 2156    Lab Status: Final result Specimen: Urine Updated: 03/23/21 2157    Urine Microscopic [[id number]] Collected: 03/23/21 2139    Lab Status:  In process Specimen: Urine, Other Updated: 03/23/21 2151    Urine Macroscopic, POC [[id number]]  (Abnormal) Collected: 03/23/21 2139    Lab Status: Final result Specimen: Urine Updated: 03/23/21 2141     Color, UA Yellow     Clarity, UA Cloudy     pH, UA 6 0     Leukocytes, UA Trace     Nitrite, UA Negative     Protein,  (2+) mg/dl      Glucose,  (1/2%) mg/dl      Ketones, UA 80 (3+) mg/dl      Urobilinogen, UA 1 0 E U /dl      Bilirubin, UA Interference- unable to analyze     Blood, UA Large     Specific Gravity, UA >=1 030    Narrative:      CLINITEK RESULT    Basic metabolic panel [[id number]]  (Abnormal) Collected: 03/23/21 2109    Lab Status: Final result Specimen: Blood from Arm, Left Updated: 03/23/21 2126     Sodium 133 mmol/L      Potassium 3 1 mmol/L      Chloride 99 mmol/L      CO2 17 mmol/L      ANION GAP 17 mmol/L      BUN 4 mg/dL      Creatinine 0 80 mg/dL      Glucose 232 mg/dL      Calcium 8 5 mg/dL      eGFR 84 ml/min/1 73sq m     Narrative:      [place] guidelines for Chronic Kidney Disease (CKD):     Stage 1 with normal or high GFR (GFR > 90 mL/min/1 73 square meters)    Stage 2 Mild CKD (GFR = 60-89 mL/min/1 73 square meters)    Stage 3A Moderate CKD (GFR = 45-59 mL/min/1 73 square meters)    Stage 3B Moderate CKD (GFR = 30-44 mL/min/1 73 square meters)    Stage 4 Severe CKD (GFR = 15-29 mL/min/1 73 square meters)    Stage 5 End Stage CKD (GFR <15 mL/min/1 73 square meters)  Note: GFR calculation is accurate only with a steady state creatinine    Blood gas, venous [[id number]]  (Abnormal) Collected: 03/23/21 2107    Lab Status: Final result Specimen: Blood from Arm, Left Updated: 03/23/21 2118     pH, Andrzej 7 318     pCO2, Andrzej 34 3 mm Hg      pO2, Andrzej 52 0 mm Hg      HCO3, Andrzej 17 2 mmol/L      Base Excess, Andrzej -8 0 mmol/L      O2 Content, Andrzej 14 1 ml/dL      O2 HGB, VENOUS 84 3 %     Acetaminophen level-If concentration is detectable, please discuss with medical  on call   [[id number]]  (Abnormal) Collected: 03/23/21 2028    Lab Status: Final result Specimen: Blood from Arm, Left Updated: 03/23/21 2111     Acetaminophen Level <2 ug/mL     Ethanol [[id number]]  (Normal) Collected: 03/23/21 2028    Lab Status: Final result Specimen: Blood from Arm, Left Updated: 03/23/21 2106     Ethanol Lvl <3 mg/dL     Salicylate level [[id number]]  (Normal) Collected: 03/23/21 2028    Lab Status: Final result Specimen: Blood from Arm, Left Updated: 22/23/33 3715     Salicylate Lvl 6 0 mg/dL     Beta Hydroxybutyrate [[id number]]  (Abnormal) Collected: 03/23/21 2028    Lab Status: Final result Specimen: Blood from Arm, Left Updated: 03/23/21 2040     BETA-HYDROXYBUTYRATE 5 3 mmol/L     Comprehensive metabolic panel [[id number]]  (Abnormal) Collected: 03/23/21 1923    Lab Status: Final result Specimen: Blood from Arm, Right Updated: 03/23/21 2003     Sodium 132 mmol/L      Potassium 3 2 mmol/L      Chloride 95 mmol/L      CO2 16 mmol/L      ANION GAP 21 mmol/L      BUN 4 mg/dL      Creatinine 0 76 mg/dL      Glucose 242 mg/dL      Calcium 9 2 mg/dL Corrected Calcium 9 9 mg/dL      AST 58 U/L      ALT 1,399 U/L      Alkaline Phosphatase 214 U/L      Total Protein 7 3 g/dL      Albumin 3 1 g/dL      Total Bilirubin 1 22 mg/dL      eGFR 90 ml/min/1 73sq m     Narrative:      [place] guidelines for Chronic Kidney Disease (CKD):     Stage 1 with normal or high GFR (GFR > 90 mL/min/1 73 square meters)    Stage 2 Mild CKD (GFR = 60-89 mL/min/1 73 square meters)    Stage 3A Moderate CKD (GFR = 45-59 mL/min/1 73 square meters)    Stage 3B Moderate CKD (GFR = 30-44 mL/min/1 73 square meters)    Stage 4 Severe CKD (GFR = 15-29 mL/min/1 73 square meters)    Stage 5 End Stage CKD (GFR <15 mL/min/1 73 square meters)  Note: GFR calculation is accurate only with a steady state creatinine    CBC and differential [[id number]]  (Abnormal) Collected: 03/23/21 1923    Lab Status: Final result Specimen: Blood from Arm, Right Updated: 03/23/21 1952     WBC 8 23 Thousand/uL      RBC 3 99 Million/uL      Hemoglobin 12 5 g/dL      Hematocrit 39 2 %      MCV 98 fL      MCH 31 3 pg      MCHC 31 9 g/dL      RDW 15 5 %      MPV 9 4 fL      Platelets 880 Thousands/uL      nRBC 0 /100 WBCs     Narrative: This is an appended report  These results have been appended to a previously verified report      Manual Differential(PHLEBS Do Not Order) [[id number]]  (Abnormal) Collected: 03/23/21 1923    Lab Status: Final result Specimen: Blood from Arm, Right Updated: 03/23/21 1952     Segmented % 47 %      Bands % 3 %      Lymphocytes % 26 %      Monocytes % 15 %      Eosinophils, % 3 %      Basophils % 0 %      Myelocytes % 6 %      Absolute Neutrophils 4 12 Thousand/uL      Lymphocytes Absolute 2 14 Thousand/uL      Monocytes Absolute 1 23 Thousand/uL      Eosinophils Absolute 0 25 Thousand/uL      Basophils Absolute 0 00 Thousand/uL      Total Counted 100     RBC Morphology Normal     Platelet Estimate Adequate    Rapid drug screen, urine [[id number]]  (Normal) Collected: 03/23/21 1411    Lab Status: Final result Specimen: Urine, Clean Catch Updated: 03/23/21 1444     Amph/Meth UR Negative     Barbiturate Ur Negative     Benzodiazepine Urine Negative     Cocaine Urine Negative     Methadone Urine Negative     Opiate Urine Negative     PCP Ur Negative     THC Urine Negative     Oxycodone Urine Negative    Narrative:      FOR MEDICAL PURPOSES ONLY  IF CONFIRMATION NEEDED PLEASE CONTACT THE LAB WITHIN 5 DAYS  Drug Screen Cutoff Levels:  AMPHETAMINE/METHAMPHETAMINES  1000 ng/mL  BARBITURATES     200 ng/mL  BENZODIAZEPINES     200 ng/mL  COCAINE      300 ng/mL  METHADONE      300 ng/mL  OPIATES      300 ng/mL  PHENCYCLIDINE     25 ng/mL  THC       50 ng/mL  OXYCODONE      100 ng/mL    COVID19, Influenza A/B, RSV PCR, [facility] [[id number]]  (Normal) Collected: 03/23/21 1001    Lab Status: Final result Specimen: Nasopharyngeal Swab Updated: 03/23/21 1116     SARS-CoV-2 Negative     INFLUENZA A PCR Negative     INFLUENZA B PCR Negative     RSV PCR Negative    Narrative: This test has been authorized by FDA under an EUA (Emergency Use Assay) for use by authorized laboratories  Clinical caution and judgement should be used with the interpretation of these results with consideration of the clinical impression and other laboratory testing  Testing reported as "Positive" or "Negative" has been proven to be accurate according to standard laboratory validation requirements  All testing is performed with control materials showing appropriate reactivity at standard intervals  POCT alcohol breath test [[id number]]  (Normal) Resulted: 03/23/21 0957    Lab Status: Final result Updated: 03/23/21 0957     EXTBreath Alcohol 0            ED Course / Workup Pending (followup):  201 signed  A reviewing facility requested labs and EKG, which uncovered a plethora of lab abnormalities  CT scan done for transaminitis  Patient denies abdominal pain, excessive tylenol or alcohol use    CT shows no acute finding in abdomen/pelvis  Patient initially given 1L NS for anion gap, then change to D5NS @ 125mL/hour  Suspect labs due to starvation ketosis as patient reports decreased appetite for the last 5 days  ED Course as of Mar 23 2225   Tue Mar 23, 2021   1543 Sign out from [place], [place]; 201 signed; medically cleared; await placement      [address] requesting CBC, CMP, EKG      1933 WBC: 8 23   1935 Hemoglobin: 12 5   1935 HCT: 39 2   2008 Sodium(!): 132   2008 Potassium(!): 3 2   2008 CO2(!): 16   2008 Anion Gap(!): 21   2008 Glucose, Random(!): 242   2008 Creatinine: 0 76   2008 AST(!): 58   2008 ALT(!): 1,399   2008 Alkaline Phosphatase(!): 214   2009 TOTAL BILIRUBIN(!): 1 22   2047 BETA-HYDROXYBUTYRATE(!): 5 3   6118 SALICYLATE LEVEL: 6 0   2051 Discussed labs with patient; patient denies alcohol, drug including tylenol use  Patient denies and abdominal pain  Nontender on exam for me  Discussed need for IVF and CT scan  Patient reports not having appetite and not eating well for 5 days  2117 ACETAMINOPHEN LEVEL(!): <2   2125 pH, Andrzej: 7 318   2134 Anion Gap(!): 17   2134 Will give D5NS; will admit to [facility]       2143 Ketones, UA(!): 80 (3+)   2143 Bilirubin, UA(!): Interference- unable to analyze   2143 POCT URINE PROTEIN(!): 100 (2+)   2208 IMPRESSION:     1  No acute findings identified in the abdomen or pelvis  2   Interval increase in the size of bilateral external iliac lymph nodes and bilateral inguinal lymph nodes as above, nonspecific     CT abdomen pelvis with contrast   2212 Tiger Text to SLIM for admission      2216 SLIM to admit, inpatient Dr Tiffany Gagnon        ECG 12 Lead Documentation Only    Date/Time: 3/23/2021 8:49 PM  Performed by: Hinda Severe, PA-C  Authorized by: Hinda Severe, PA-C     Indications / Diagnosis:  Clearance  ECG reviewed by me, the ED Provider: yes (also Dr Doug Lucas)    Patient location:  ED  Previous ECG: Previous ECG:  Unavailable  Interpretation:     Interpretation: abnormal    Rate:     ECG rate:  105    ECG rate assessment: tachycardic    Rhythm:     Rhythm: sinus tachycardia    Ectopy:     Ectopy: none    QRS:     QRS axis:  Normal    QRS intervals:  Normal  Conduction:     Conduction: normal    ST segments:     ST segments:  Normal  T waves:     T waves: normal        MDM  Number of Diagnoses or Management Options  Anxiety:   Diabetes ([facility] ): Hyperbilirubinemia:   Hypokalemia:   Hyponatremia:   Starvation ketoacidosis:   Suicidal ideation:   Tachycardia:   Transaminitis:   Diagnosis management comments: Patient to be admitted to medical floor for labs abnormalities  Psych consult  Will need 1:1 for suicidal ideations  Patient in agreement with plan  Plan and workup discussed with Dr Milford Collet, who agrees         Amount and/or Complexity of Data Reviewed  Clinical lab tests: ordered and reviewed  Tests in the radiology section of CPT®: ordered  Discuss the patient with other providers: yes (Dr Milford Collet)        Disposition  Final diagnoses:   Suicidal ideation   Anxiety   Diabetes ([facility] )   Tachycardia   Transaminitis   Starvation ketoacidosis   Hypokalemia   Hyponatremia   Hyperbilirubinemia     Time reflects when diagnosis was documented in both MDM as applicable and the Disposition within this note     Time User Action Codes Description Comment    3/23/2021  3:10 PM Zazueta, [address] Suicidal ideation     3/23/2021  3:10 PM Jeremy Hillock Add [F41 9] Anxiety     3/23/2021  9:41 PM Delmy Zara [E11 9] Diabetes ([facility] )     3/23/2021  9:41 PM Izaiah Hurst Add [R00 0] Tachycardia     3/23/2021  9:41 PM Izaiah Hurst Add [R74 01] Transaminitis     3/23/2021  9:42 PM Izaiah Hurst Add [E87 2] Starvation ketoacidosis     3/23/2021  9:42 PM Izaiah Hurst Add [E87 6] Hypokalemia     3/23/2021  9:42 PM Izaiah Hurst Add [E87 1] Hyponatremia     3/23/2021  9:42 PM Marley Foster Add [E80 6] Hyperbilirubinemia       ED Disposition     ED Disposition Condition Date/Time Comment    Admit Stable Tue Mar 23, 2021 10:19 PM Case was discussed with Dr Pearle Primrose, who agrees to inpatient admission under Dr Seema womack MD Documentation      Most Recent Value   Sending MD Dr Rashaun Treviño    None       Patient's Medications   Discharge Prescriptions    No medications on file     No discharge procedures on file         ED Provider  Electronically Signed by     Isis Natarajan PA-C  03/23/21 3095

## 2021-03-24 LAB
ALBUMIN SERPL BCP-MCNC: 2.7 G/DL (ref 3.5–5)
ALP SERPL-CCNC: 174 U/L (ref 46–116)
ALT SERPL W P-5'-P-CCNC: 948 U/L (ref 12–78)
ANION GAP SERPL CALCULATED.3IONS-SCNC: 14 MMOL/L (ref 4–13)
ANION GAP SERPL CALCULATED.3IONS-SCNC: 14 MMOL/L (ref 4–13)
ANISOCYTOSIS BLD QL SMEAR: PRESENT
AST SERPL W P-5'-P-CCNC: 41 U/L (ref 5–45)
ATRIAL RATE: 105 BPM
BASOPHILS # BLD MANUAL: 0 THOUSAND/UL (ref 0–0.1)
BASOPHILS NFR MAR MANUAL: 0 % (ref 0–1)
BILIRUB SERPL-MCNC: 0.82 MG/DL (ref 0.2–1)
BUN SERPL-MCNC: 4 MG/DL (ref 5–25)
BUN SERPL-MCNC: 4 MG/DL (ref 5–25)
CALCIUM ALBUM COR SERPL-MCNC: 9.5 MG/DL (ref 8.3–10.1)
CALCIUM SERPL-MCNC: 8.2 MG/DL (ref 8.3–10.1)
CALCIUM SERPL-MCNC: 8.5 MG/DL (ref 8.3–10.1)
CHLORIDE SERPL-SCNC: 100 MMOL/L (ref 100–108)
CHLORIDE SERPL-SCNC: 103 MMOL/L (ref 100–108)
CO2 SERPL-SCNC: 20 MMOL/L (ref 21–32)
CO2 SERPL-SCNC: 21 MMOL/L (ref 21–32)
CREAT SERPL-MCNC: 0.77 MG/DL (ref 0.6–1.3)
CREAT SERPL-MCNC: 0.79 MG/DL (ref 0.6–1.3)
EOSINOPHIL # BLD MANUAL: 0.25 THOUSAND/UL (ref 0–0.4)
EOSINOPHIL NFR BLD MANUAL: 3 % (ref 0–6)
ERYTHROCYTE [DISTWIDTH] IN BLOOD BY AUTOMATED COUNT: 15.9 % (ref 11.6–15.1)
GFR SERPL CREATININE-BSD FRML MDRD: 86 ML/MIN/1.73SQ M
GFR SERPL CREATININE-BSD FRML MDRD: 88 ML/MIN/1.73SQ M
GLUCOSE SERPL-MCNC: 219 MG/DL (ref 65–140)
GLUCOSE SERPL-MCNC: 253 MG/DL (ref 65–140)
GLUCOSE SERPL-MCNC: 254 MG/DL (ref 65–140)
GLUCOSE SERPL-MCNC: 263 MG/DL (ref 65–140)
GLUCOSE SERPL-MCNC: 288 MG/DL (ref 65–140)
GLUCOSE SERPL-MCNC: 290 MG/DL (ref 65–140)
GLUCOSE SERPL-MCNC: 297 MG/DL (ref 65–140)
HAV IGM SER QL: NORMAL
HBV CORE IGM SER QL: NORMAL
HBV SURFACE AG SER QL: NORMAL
HCT VFR BLD AUTO: 34.7 % (ref 34.8–46.1)
HCV AB SER QL: NORMAL
HGB BLD-MCNC: 11.3 G/DL (ref 11.5–15.4)
LG PLATELETS BLD QL SMEAR: PRESENT
LYMPHOCYTES # BLD AUTO: 1.94 THOUSAND/UL (ref 0.6–4.47)
LYMPHOCYTES # BLD AUTO: 23 % (ref 14–44)
MAGNESIUM SERPL-MCNC: 1.6 MG/DL (ref 1.6–2.6)
MCH RBC QN AUTO: 31.7 PG (ref 26.8–34.3)
MCHC RBC AUTO-ENTMCNC: 32.6 G/DL (ref 31.4–37.4)
MCV RBC AUTO: 97 FL (ref 82–98)
METAMYELOCYTES NFR BLD MANUAL: 1 % (ref 0–1)
MONOCYTES # BLD AUTO: 1.35 THOUSAND/UL (ref 0–1.22)
MONOCYTES NFR BLD: 16 % (ref 4–12)
MYELOCYTES NFR BLD MANUAL: 2 % (ref 0–1)
NEUTROPHILS # BLD MANUAL: 4.64 THOUSAND/UL (ref 1.85–7.62)
NEUTS BAND NFR BLD MANUAL: 2 % (ref 0–8)
NEUTS SEG NFR BLD AUTO: 53 % (ref 43–75)
NRBC BLD AUTO-RTO: 0 /100 WBCS
P AXIS: 69 DEGREES
PHOSPHATE SERPL-MCNC: 1.5 MG/DL (ref 2.7–4.5)
PHOSPHATE SERPL-MCNC: 1.7 MG/DL (ref 2.7–4.5)
PLATELET # BLD AUTO: 336 THOUSANDS/UL (ref 149–390)
PLATELET BLD QL SMEAR: ADEQUATE
PMV BLD AUTO: 9.5 FL (ref 8.9–12.7)
POTASSIUM SERPL-SCNC: 3.3 MMOL/L (ref 3.5–5.3)
POTASSIUM SERPL-SCNC: 3.5 MMOL/L (ref 3.5–5.3)
PR INTERVAL: 182 MS
PROT SERPL-MCNC: 6.8 G/DL (ref 6.4–8.2)
QRS AXIS: 67 DEGREES
QRSD INTERVAL: 80 MS
QT INTERVAL: 358 MS
QTC INTERVAL: 473 MS
RBC # BLD AUTO: 3.57 MILLION/UL (ref 3.81–5.12)
SODIUM SERPL-SCNC: 134 MMOL/L (ref 136–145)
SODIUM SERPL-SCNC: 138 MMOL/L (ref 136–145)
T WAVE AXIS: 46 DEGREES
TOTAL CELLS COUNTED SPEC: 100
VENTRICULAR RATE: 105 BPM
WBC # BLD AUTO: 8.43 THOUSAND/UL (ref 4.31–10.16)

## 2021-03-24 PROCEDURE — 86664 EPSTEIN-BARR NUCLEAR ANTIGEN: CPT | Performed by: PHYSICIAN ASSISTANT

## 2021-03-24 PROCEDURE — 93010 ELECTROCARDIOGRAM REPORT: CPT | Performed by: INTERNAL MEDICINE

## 2021-03-24 PROCEDURE — 82948 REAGENT STRIP/BLOOD GLUCOSE: CPT

## 2021-03-24 PROCEDURE — 86663 EPSTEIN-BARR ANTIBODY: CPT | Performed by: PHYSICIAN ASSISTANT

## 2021-03-24 PROCEDURE — G0008 ADMIN INFLUENZA VIRUS VAC: HCPCS | Performed by: INTERNAL MEDICINE

## 2021-03-24 PROCEDURE — 86645 CMV ANTIBODY IGM: CPT | Performed by: PHYSICIAN ASSISTANT

## 2021-03-24 PROCEDURE — 36415 COLL VENOUS BLD VENIPUNCTURE: CPT | Performed by: PHYSICIAN ASSISTANT

## 2021-03-24 PROCEDURE — 86665 EPSTEIN-BARR CAPSID VCA: CPT | Performed by: PHYSICIAN ASSISTANT

## 2021-03-24 PROCEDURE — 99232 SBSQ HOSP IP/OBS MODERATE 35: CPT | Performed by: INTERNAL MEDICINE

## 2021-03-24 PROCEDURE — 90682 RIV4 VACC RECOMBINANT DNA IM: CPT | Performed by: INTERNAL MEDICINE

## 2021-03-24 PROCEDURE — 83735 ASSAY OF MAGNESIUM: CPT | Performed by: PHYSICIAN ASSISTANT

## 2021-03-24 PROCEDURE — 86644 CMV ANTIBODY: CPT | Performed by: PHYSICIAN ASSISTANT

## 2021-03-24 PROCEDURE — 87529 HSV DNA AMP PROBE: CPT | Performed by: PHYSICIAN ASSISTANT

## 2021-03-24 PROCEDURE — 80053 COMPREHEN METABOLIC PANEL: CPT | Performed by: PHYSICIAN ASSISTANT

## 2021-03-24 PROCEDURE — 85027 COMPLETE CBC AUTOMATED: CPT | Performed by: PHYSICIAN ASSISTANT

## 2021-03-24 PROCEDURE — 80048 BASIC METABOLIC PNL TOTAL CA: CPT | Performed by: PHYSICIAN ASSISTANT

## 2021-03-24 PROCEDURE — 84100 ASSAY OF PHOSPHORUS: CPT | Performed by: PHYSICIAN ASSISTANT

## 2021-03-24 PROCEDURE — 85007 BL SMEAR W/DIFF WBC COUNT: CPT | Performed by: PHYSICIAN ASSISTANT

## 2021-03-24 PROCEDURE — 99223 1ST HOSP IP/OBS HIGH 75: CPT | Performed by: INTERNAL MEDICINE

## 2021-03-24 PROCEDURE — 80074 ACUTE HEPATITIS PANEL: CPT | Performed by: PHYSICIAN ASSISTANT

## 2021-03-24 RX ORDER — METRONIDAZOLE 500 MG/1
500 TABLET ORAL EVERY 12 HOURS SCHEDULED
Status: DISCONTINUED | OUTPATIENT
Start: 2021-03-24 | End: 2021-03-24 | Stop reason: SDUPTHER

## 2021-03-24 RX ORDER — POTASSIUM CHLORIDE 20 MEQ/1
40 TABLET, EXTENDED RELEASE ORAL ONCE
Status: DISCONTINUED | OUTPATIENT
Start: 2021-03-24 | End: 2021-03-24

## 2021-03-24 RX ADMIN — INSULIN LISPRO 3 UNITS: 100 INJECTION, SOLUTION INTRAVENOUS; SUBCUTANEOUS at 10:56

## 2021-03-24 RX ADMIN — LEVOTHYROXINE SODIUM 25 MCG: 25 TABLET ORAL at 05:50

## 2021-03-24 RX ADMIN — METRONIDAZOLE 500 MG: 500 TABLET ORAL at 21:20

## 2021-03-24 RX ADMIN — GABAPENTIN 800 MG: 400 CAPSULE ORAL at 16:23

## 2021-03-24 RX ADMIN — MAGNESIUM OXIDE TAB 400 MG (241.3 MG ELEMENTAL MG) 800 MG: 400 (241.3 MG) TAB at 04:06

## 2021-03-24 RX ADMIN — DULOXETINE HYDROCHLORIDE 30 MG: 30 CAPSULE, DELAYED RELEASE ORAL at 09:02

## 2021-03-24 RX ADMIN — METOPROLOL TARTRATE 50 MG: 50 TABLET, FILM COATED ORAL at 00:09

## 2021-03-24 RX ADMIN — TRAZODONE HYDROCHLORIDE 200 MG: 100 TABLET ORAL at 21:18

## 2021-03-24 RX ADMIN — INFLUENZA A VIRUS A/HAWAII/70/2019 (H1N1) RECOMBINANT HEMAGGLUTININ ANTIGEN, INFLUENZA A VIRUS A/MINNESOTA/41/2019 (H3N2) RECOMBINANT HEMAGGLUTININ ANTIGEN, INFLUENZA B VIRUS B/WASHINGTON/02/2019 RECOMBINANT HEMAGGLUTININ ANTIGEN, AND INFLUENZA B VIRUS B/PHUKET/3073/2013 RECOMBINANT HEMAGGLUTININ ANTIGEN 0.5 ML: 45; 45; 45; 45 INJECTION INTRAMUSCULAR at 21:31

## 2021-03-24 RX ADMIN — METOPROLOL TARTRATE 50 MG: 50 TABLET, FILM COATED ORAL at 09:04

## 2021-03-24 RX ADMIN — METRONIDAZOLE 500 MG: 500 TABLET ORAL at 00:09

## 2021-03-24 RX ADMIN — Medication 1 PATCH: at 09:03

## 2021-03-24 RX ADMIN — METOPROLOL TARTRATE 50 MG: 50 TABLET, FILM COATED ORAL at 21:21

## 2021-03-24 RX ADMIN — CLONIDINE HYDROCHLORIDE 0.1 MG: 0.1 TABLET ORAL at 21:18

## 2021-03-24 RX ADMIN — ENOXAPARIN SODIUM 40 MG: 40 INJECTION SUBCUTANEOUS at 10:33

## 2021-03-24 RX ADMIN — DEXTROSE AND SODIUM CHLORIDE 125 ML/HR: 5; .9 INJECTION, SOLUTION INTRAVENOUS at 16:23

## 2021-03-24 RX ADMIN — DIBASIC SODIUM PHOSPHATE, MONOBASIC POTASSIUM PHOSPHATE AND MONOBASIC SODIUM PHOSPHATE 2 TABLET: 852; 155; 130 TABLET ORAL at 04:07

## 2021-03-24 RX ADMIN — GABAPENTIN 800 MG: 400 CAPSULE ORAL at 21:17

## 2021-03-24 RX ADMIN — METRONIDAZOLE 500 MG: 500 TABLET ORAL at 09:03

## 2021-03-24 RX ADMIN — GABAPENTIN 800 MG: 400 CAPSULE ORAL at 10:33

## 2021-03-24 RX ADMIN — CEFTRIAXONE SODIUM 1000 MG: 10 INJECTION, POWDER, FOR SOLUTION INTRAVENOUS at 00:07

## 2021-03-24 RX ADMIN — FOLIC ACID 1 MG: 1 TABLET ORAL at 09:03

## 2021-03-24 RX ADMIN — INSULIN LISPRO 3 UNITS: 100 INJECTION, SOLUTION INTRAVENOUS; SUBCUTANEOUS at 22:30

## 2021-03-24 RX ADMIN — INSULIN LISPRO 2 UNITS: 100 INJECTION, SOLUTION INTRAVENOUS; SUBCUTANEOUS at 16:23

## 2021-03-24 RX ADMIN — INSULIN LISPRO 4 UNITS: 100 INJECTION, SOLUTION INTRAVENOUS; SUBCUTANEOUS at 00:11

## 2021-03-24 NOTE — H&P
[address] [facility]  H&P- Hal Leger 1965, 50 y o  female MRN: [id number]  Unit/Bed#: ED 32 Encounter: [id number]  Primary Care Provider: YONG Masterson   Date and time admitted to hospital: 3/23/2021  9:12 AM    * Increased anion gap metabolic acidosis  Assessment & Plan  · Patient was found to have anion gap metabolic acidosis with CO2 16, anion gap 21- repeat CO2 17 and gap 17  · Likely in the setting of starvation ketosis as patient notes she has not eaten anything for 5 days  · PH stable at 7 3  · Beta hydroxybutyrate elevated at 5 3  · Received IV fluid hydration in ED, continue  · Monitor BMP, recheck at 2:00 a m  And with morning labs   · Diet as tolerated  · Potassium repleted orally, recheck    Bipolar disorder current episode depressed ([facility] [address] )  Assessment & Plan  · Patient also presented to the hospital with complaint of anxiety with suicidal ideation  · Initially was cleared for inpatient psych placement, labs were obtained showing likely starvation ketoacidosis on admission was requested  · Plan for inpatient psychiatric consult  · Continue observation  · Continue home medications    Essential hypertension  Assessment & Plan  · BP elevated at time of admission  · Continue Lopressor and Catapres    Tobacco abuse  Assessment & Plan  · Nicotine patch while inpatient  · Tobacco cessation education    Transaminitis  Assessment & Plan  · AST elevated at 58, ALT 1399, alk-phos 214 and T bili 1 22  · GI consult  · IV fluid hydration  · Likely secondary to starvation ketosis    Acquired hypothyroidism  Assessment & Plan  · Continue levothyroxine    Type 2 diabetes mellitus without complication, without long-term current use of insulin (HCC)  Assessment & Plan  Lab Results   Component Value Date    HGBA1C 6 8 (H) 09/02/2020       No results for input(s): POCGLU in the last 72 hours      Blood Sugar Average: Last 72 hrs:  ·  diet controlled outpatient  · Sliding-scale insulin coverage with Accu-Cheks while inpatient    VTE Prophylaxis: Enoxaparin (Lovenox)     Code Status:  Full code  POLST: POLST form is not discussed and not completed at this time  Discussion with family:  None    Anticipated Length of Stay:  Patient will be admitted on an Inpatient basis with an anticipated length of stay of  more than 2 midnights  Justification for Hospital Stay:  Anion gap metabolic acidosis    Total Time for Visit, including Counseling / Coordination of Care: 1 hour  Greater than 50% of this total time spent on direct patient counseling and coordination of care  Chief Complaint:   Anxiety    History of Present Illness:    Mayank Talley is a 50 y o  female with past medical history of bipolar disorder, diabetes, alcohol abuse, hypertension, hypothyroidism who presents with anxiety  Patient reports progressive worsening anxiety for the past few weeks  Came to the hospital today for suicidal ideation due to worsening anxiety  Patient noted plan to overdose on pills  Patient notes she was last inpatient approximately 3-4 months ago  Patient reports she last drank alcohol approximately 1 month ago  Does continue to smoke about 1 pack of cigarettes per day  Patient was found to have anion gap metabolic acidosis  Patient notes she has not been eating or drinking anything for the past 5 days due to very poor appetite  Does report she has continued to take her medications  Not maintained on any medications for her diabetes outpatient  Patient denies any chest pain or shortness of breath  Notes she was feeling nausea yesterday but denies any nausea or abdominal pain today  Denies any lightheadedness or dizziness  Review of Systems:    Review of Systems   Constitutional: Positive for appetite change  Negative for chills and fever  HENT: Negative for trouble swallowing  Eyes: Negative for visual disturbance  Respiratory: Negative for cough and shortness of breath      Cardiovascular: Negative for chest pain and leg swelling  Gastrointestinal: Positive for nausea  Negative for abdominal pain and vomiting  Genitourinary: Negative for difficulty urinating  Musculoskeletal: Negative for back pain and gait problem  Skin: Positive for rash  Neurological: Negative for dizziness and weakness  Psychiatric/Behavioral: Positive for suicidal ideas  The patient is nervous/anxious  Past Medical and Surgical History:     Past Medical History:   Diagnosis Date    Addiction to drug ([place] [address] )     Adjustment disorder     Alcohol abuse     Alcoholism ([place] [address] )     Anxiety     Bipolar disorder (HCC)     Bowel obstruction ([place] [address] )     Depression     Diabetes type 2, controlled ([place] [address] )     Disease of thyroid gland     Gastritis     Head injury     Heart palpitations     Hyperlipidemia     Hypertension     Hypothyroidism     Psychiatric illness     PTSD (post-traumatic stress disorder)     Seizure ([place] [address] )     Seizures ([place] [address] )     Sleep difficulties     Substance abuse ([place] [address] )     Suicide attempt ([place] [address] )     Withdrawal symptoms, drug or narcotic ([place] [address] )        Past Surgical History:   Procedure Laterality Date    ABDOMINAL SURGERY      APPENDECTOMY      BOWEL RESECTION      CHOLECYSTECTOMY      ESOPHAGOGASTRODUODENOSCOPY N/A 5/18/2018    Procedure: ESOPHAGOGASTRODUODENOSCOPY (EGD) with bx;  Surgeon: Igor Hayes DO;  Location: [facility]; Service: Gastroenterology    HYSTERECTOMY      KNEE SURGERY Bilateral        Meds/Allergies:    Prior to Admission medications    Medication Sig Start Date End Date Taking?  Authorizing Provider   gabapentin (NEURONTIN) 300 mg capsule Take 1 capsule (300 mg total) by mouth 3 (three) times a day At 9am, 4pm, 9pm  Patient taking differently: Take 800 mg by mouth 3 (three) times a day  10/12/20  Yes Ivette Spencer PA-C   ketoconazole (NIZORAL) 2 % cream Apply topically daily Apply daily to affected areas around your vagina 3/17/21  Yes Enrike Ayala DO traZODone (DESYREL) 100 mg tablet Take 200 mg by mouth daily at bedtime   Yes Historical Provider, MD   amLODIPine (NORVASC) 5 mg tablet Take 1 tablet (5 mg total) by mouth daily before lunch 10/12/20 3/23/21 Yes Randolph Farley PA-C   ARIPiprazole (ABILIFY) 10 mg tablet Take 1 tablet (10 mg total) by mouth daily for 10 days 10/13/20 3/23/21 Yes Randolph Farley PA-C   ARIPiprazole ER (ABILIFY MAINTENA) 300 MG injection Inject 300 mg into a muscle every 28 days 11/6/20   Randolph Farley PA-C   benztropine (COGENTIN) 0 5 mg tablet Take 1 tablet (0 5 mg total) by mouth 2 (two) times a day At 9am and 6pm  Patient taking differently: Take 1 mg by mouth 2 (two) times a day  10/12/20   Randolph Farley PA-C   cloNIDine (CATAPRES) 0 1 mg tablet Take 1 tablet (0 1 mg total) by mouth daily at bedtime 10/12/20   Randolph Farley PA-C   DULoxetine (CYMBALTA) 30 mg delayed release capsule Take 30 mg by mouth daily    Historical Provider, MD   ERYTHROMYCIN OP Apply to eye    Historical Provider, MD   folic acid (FOLVITE) 1 mg tablet Take by mouth daily    Historical Provider, MD   hydrOXYzine HCL (ATARAX) 25 mg tablet Take 3 tablets (75 mg total) by mouth daily at bedtime  Patient taking differently: Take 25 mg by mouth 2 (two) times a day as needed  10/12/20   Randolph Farley PA-C   levothyroxine 25 mcg tablet Take 1 tablet (25 mcg total) by mouth daily in the early morning 10/13/20   Randolph Farley PA-C   lurasidone (Latuda) 120 mg tablet Take 120 mg by mouth daily with breakfast     Historical Provider, MD   metoprolol tartrate (LOPRESSOR) 25 mg tablet Take 1 tablet (25 mg total) by mouth every 12 (twelve) hours  Patient taking differently: Take 50 mg by mouth every 12 (twelve) hours  10/12/20   Randolph Farley PA-C   naproxen sodium (ANAPROX) 550 mg tablet Take 1 tablet (550 mg total) by mouth 2 (two) times a day with meals 3/17/21   Isha Rahman DO   pantoprazole (PROTONIX) 20 mg tablet Take 1 tablet (20 mg total) by mouth daily in the early morning 10/12/20 11/11/20  Samaria Greer PA-C   sulfamethoxazole-trimethoprim (BACTRIM DS) 800-160 mg per tablet Take 1 tablet by mouth 2 (two) times a day for 7 days 3/17/21 3/24/21  Génesis Rahman DO   escitalopram (LEXAPRO) 5 mg tablet Take 5 mg by mouth daily  3/23/21  Historical Provider, MD   lithium carbonate 150 mg capsule Take 150 mg by mouth 3 (three) times a day with meals  3/23/21  Historical Provider, MD   lurasidone (Latuda) 80 mg tablet Take 20 mg by mouth daily with dinner  3/23/21  Historical Provider, MD   sucralfate (CARAFATE) 1 g tablet Take 1 tablet (1 g total) by mouth 4 (four) times a day (before meals and at bedtime)  Patient not taking: Reported on 3/23/2021 10/12/20 3/23/21  Samaria Greer PA-C     I have reviewed home medications with patient personally  Allergies:    Allergies   Allergen Reactions    Latex Rash       Social History:     Marital Status:    Occupation:  Unknown  Patient Pre-hospital Living Situation:  Unknown  Patient Pre-hospital Level of Mobility:  Ambulatory  Patient Pre-hospital Diet Restrictions:  None  Substance Use History:   Social History     Substance and Sexual Activity   Alcohol Use Not Currently    Alcohol/week: 6 0 standard drinks    Types: 6 Cans of beer per week    Frequency: 4 or more times a week    Drinks per session: 5 or 6    Binge frequency: Weekly    Comment: last drink on 08/15/20     Social History     Tobacco Use   Smoking Status Current Every Day Smoker    Packs/day: 0 50    Years: 25 00    Pack years: 12 50    Types: Cigarettes   Smokeless Tobacco Never Used     Social History     Substance and Sexual Activity   Drug Use No       Family History:    Family History   Problem Relation Age of Onset    Diabetes Father     Bipolar disorder Mother        Physical Exam:     Vitals:   Blood Pressure: 167/95 (03/23/21 2002)  Pulse: 104 (03/23/21 2002)  Temperature: 98 2 °F (36 8 °C) (03/23/21 7731)  Temp Source: Oral (03/23/21 7160)  Respirations: 16 (03/23/21 2002)  Weight - Scale: 86 6 kg (190 lb 14 7 oz) (03/23/21 0907)  SpO2: 96 % (03/23/21 2002)    Physical Exam  Vitals signs reviewed  Constitutional:       General: She is not in acute distress  HENT:      Head: Normocephalic and atraumatic  Eyes:      General: No scleral icterus  Conjunctiva/sclera: Conjunctivae normal    Neck:      Musculoskeletal: Neck supple  Cardiovascular:      Rate and Rhythm: Normal rate and regular rhythm  Heart sounds: No murmur  Pulmonary:      Effort: Pulmonary effort is normal  No respiratory distress  Breath sounds: Normal breath sounds  Abdominal:      General: Bowel sounds are normal  There is no distension  Palpations: Abdomen is soft  Tenderness: There is no abdominal tenderness  Musculoskeletal:      Right lower leg: No edema  Left lower leg: No edema  Skin:     General: Skin is warm and dry  Neurological:      Mental Status: She is alert and oriented to person, place, and time  Psychiatric:         Mood and Affect: Mood normal          Behavior: Behavior normal          Additional Data:     Lab Results: I have personally reviewed pertinent reports        Results from last 7 days   Lab Units 03/23/21  1923   WBC Thousand/uL 8 23   HEMOGLOBIN g/dL 12 5   HEMATOCRIT % 39 2   PLATELETS Thousands/uL 338   BANDS PCT % 3   LYMPHO PCT % 26   MONO PCT % 15*   EOS PCT % 3     Results from last 7 days   Lab Units 03/23/21  2109 03/23/21  1923   SODIUM mmol/L 133* 132*   POTASSIUM mmol/L 3 1* 3 2*   CHLORIDE mmol/L 99* 95*   CO2 mmol/L 17* 16*   BUN mg/dL 4* 4*   CREATININE mg/dL 0 80 0 76   ANION GAP mmol/L 17* 21*   CALCIUM mg/dL 8 5 9 2   ALBUMIN g/dL  --  3 1*   TOTAL BILIRUBIN mg/dL  --  1 22*   ALK PHOS U/L  --  214*   ALT U/L  --  1,399*   AST U/L  --  58*   GLUCOSE RANDOM mg/dL 232* 242*                       Imaging: I have personally reviewed pertinent reports  CT abdomen pelvis with contrast   Final Result by Pinkie Lesch, MD (03/23 2205)      1  No acute findings identified in the abdomen or pelvis  2   Interval increase in the size of bilateral external iliac lymph nodes and bilateral inguinal lymph nodes as above, nonspecific  Workstation performed: [id number]             EKG, Pathology, and Other Studies Reviewed on Admission:   · EKG:  Sinus tachycardia    Allscripts / Epic Records Reviewed: Yes     ** Please Note: This note has been constructed using a voice recognition system   **

## 2021-03-24 NOTE — ED NOTES
Patient transported to [facility] [facility]  03/23/21 2124

## 2021-03-24 NOTE — ED NOTES
Labs were done and resulted  Per ARACELIS Gallagher, patient is not medically cleared  Bibiana Ritchie at [facility] is aware that patient is not medically cleared  He stated patient can be re-presented once cleared

## 2021-03-24 NOTE — ASSESSMENT & PLAN NOTE
· Patient also presented to the hospital with complaint of anxiety with suicidal ideation  · Initially was cleared for inpatient psych placement, labs were obtained showing likely starvation ketoacidosis on admission was requested  · Plan for inpatient psychiatric consult  · Continue observation  · Continue home medications

## 2021-03-24 NOTE — ASSESSMENT & PLAN NOTE
· AST elevated at 58, ALT 1399, alk-phos 214 and T bili 1 22  · CT abdomen pelvis unremarkable  · GI consultation appreciated  · LFTs trending down  · Follow-up viral hepatitis panel  · Will monitor LFTs continue IV foot

## 2021-03-24 NOTE — ASSESSMENT & PLAN NOTE
· Patient was found to have anion gap metabolic acidosis with CO2 16, anion gap 21- repeat CO2 17 and gap 17  · Likely in the setting of starvation ketosis as patient notes she has not eaten anything for 5 days  · PH stable at 7 3  · Beta hydroxybutyrate elevated at 5 3  · Received IV fluid hydration in ED, continue  · Monitor BMP, recheck at 2:00 a m   And with morning labs   · Diet as tolerated  · Potassium repleted orally, recheck

## 2021-03-24 NOTE — ASSESSMENT & PLAN NOTE
· UA with numerable WBCs and Trichomonas  · Continue Flagyl  · Patient states she informed her sexual partner who will also be treated and will follow outpatient

## 2021-03-24 NOTE — ASSESSMENT & PLAN NOTE
Lab Results   Component Value Date    HGBA1C 6 8 (H) 09/02/2020       Recent Labs     03/23/21  2359 03/24/21  0405 03/24/21  1055 03/24/21  1553   POCGLU 290* 288* 253* 219*       Blood Sugar Average: Last 72 hrs:  · (P) 262 5 diet controlled outpatient  · Sliding-scale insulin coverage with Accu-Cheks while inpatient

## 2021-03-24 NOTE — PROGRESS NOTES
[facility] [facility]  Progress Note Reva Code 1965, 50 y o  female MRN: [id number]  Unit/Bed#: E5 -01 Encounter: [id number]  Primary Care Provider: YONG Powell   Date and time admitted to hospital: 3/23/2021  9:12 AM    * Increased anion gap metabolic acidosis  Assessment & Plan  · Patient was found to have anion gap metabolic acidosis with CO2 16, anion gap 21- repeat CO2 17 and gap 17  · Likely in the setting of starvation ketosis as patient notes she has not eaten anything for 5 days  · And gap improving, 14  · Continue with IV fluids    UTI (urinary tract infection)  Assessment & Plan  · UA with numerable WBCs and Trichomonas  · Continue Flagyl  · Patient states she informed her sexual partner who will also be treated and will follow outpatient    Tobacco abuse  Assessment & Plan  · Nicotine patch while inpatient  · Tobacco cessation education    Transaminitis  Assessment & Plan  · AST elevated at 58, ALT 1399, alk-phos 214 and T bili 1 22  · CT abdomen pelvis unremarkable  · GI consultation appreciated  · LFTs trending down  · Follow-up viral hepatitis panel  · Will monitor LFTs continue IV foot    Acquired hypothyroidism  Assessment & Plan  · Continue levothyroxine    Type 2 diabetes mellitus without complication, without long-term current use of insulin [facility])  Assessment & Plan  Lab Results   Component Value Date    HGBA1C 6 8 (H) 09/02/2020       Recent Labs     03/23/21  2359 03/24/21  0405 03/24/21  1055 03/24/21  1553   POCGLU 290* 288* 253* 219*       Blood Sugar Average: Last 72 hrs:  · (P) 262 5 diet controlled outpatient  · Sliding-scale insulin coverage with Accu-Cheks while inpatient    Bipolar disorder current episode depressed ([facility] [address] )  Assessment & Plan  · Patient also presented to the hospital with complaint of anxiety with suicidal ideation  · Initially was cleared for inpatient psych placement, labs were obtained showing likely starvation ketoacidosis on admission was requested  · Plan for inpatient psychiatric consult  · Continue observation  · Continue home medications    Essential hypertension  Assessment & Plan  · BP elevated at time of admission  · Continue Lopressor and Catapres        VTE Pharmacologic Prophylaxis:   Pharmacologic: lovenox    Patient Centered Rounds: I have performed bedside rounds with nursing staff today  Time Spent for Care: 20 minutes  More than 50% of total time spent on counseling and coordination of care as described above  Current Length of Stay: 1 day(s)    Current Patient Status: Inpatient   Certification Statement: The patient will continue to require additional inpatient hospital stay due to suicidal ideation    Discharge Plan / Estimated Discharge Date: TBD    Code Status: Level 1 - Full Code      Subjective:   Patient seen and examined at bedside, currently denies any complaints    Objective:     Vitals:   Temp (24hrs), Av 8 °F (36 6 °C), Min:97 8 °F (36 6 °C), Max:97 8 °F (36 6 °C)    Temp:  [97 8 °F (36 6 °C)] 97 8 °F (36 6 °C)  HR:  [] 100  Resp:  [16-20] 18  BP: (101-167)/(56-95) 129/65  SpO2:  [95 %-100 %] 100 %  Body mass index is 34 36 kg/m²  Input and Output Summary (last 24 hours): Intake/Output Summary (Last 24 hours) at 3/24/2021 1750  Last data filed at 3/23/2021 2206  Gross per 24 hour   Intake 1000 ml   Output --   Net 1000 ml       Physical Exam:    Constitutional: Patient is oriented to person, place and time, no acute distress  HEENT:  Normocephalic, atraumatic  Cardiovascular: Normal S1S2, RRR, No murmurs/rubs/gallops appreciated  Pulmonary:  Bilateral air entry, No rhonchi/rales/wheezing appreciated  Abdominal: Soft, Bowel sounds present, Non-tender, Non-distended  Extremities:  No cyanosis, clubbing or edema  Neurological: Cranial nerves II-XII grossly intact, sensation intact, otherwise no focal neurological symptoms     Skin:  Warm, dry    Additional Data: Labs:    Results from last 7 days   Lab Units 03/24/21  1100   WBC Thousand/uL 8 43   HEMOGLOBIN g/dL 11 3*   HEMATOCRIT % 34 7*   PLATELETS Thousands/uL 336   LYMPHO PCT % 23   MONO PCT % 16*   EOS PCT % 3     Results from last 7 days   Lab Units 03/24/21  1100   POTASSIUM mmol/L 3 5   CHLORIDE mmol/L 103   CO2 mmol/L 21   BUN mg/dL 4*   CREATININE mg/dL 0 77   CALCIUM mg/dL 8 5   ALK PHOS U/L 174*   ALT U/L 948*   AST U/L 41            I Have Reviewed All Lab Data Listed Above          Recent Cultures (last 7 days):           Last 24 Hours Medication List:   Current Facility-Administered Medications   Medication Dose Route Frequency Provider Last Rate    calcium carbonate  1,000 mg Oral Daily PRN Eva Berry PA-C      cloNIDine  0 1 mg Oral HS Argelia Harrison PA-C      dextrose 5 % and sodium chloride 0 9 %  125 mL/hr Intravenous Continuous Lisa Moctezuma PA-C 125 mL/hr (03/24/21 1623)    DULoxetine  30 mg Oral Daily Lyssa Finley PA-C      enoxaparin  40 mg Subcutaneous Q24H [facility] Argelia Harrison PA-C      folic acid  1 mg Oral Daily Macey Ramos PA-C      gabapentin  800 mg Oral TID Macey Ramos PA-C      hydrOXYzine HCL  25 mg Oral BID PRN Macey Ramos PA-C      ibuprofen  400 mg Oral Q6H PRN Argelia Harrison PA-C      insulin lispro  1-6 Units Subcutaneous TID AC Argelia Harrison PA-C      insulin lispro  1-6 Units Subcutaneous HS Argelia Harrison PA-C      levothyroxine  25 mcg Oral Early Morning Argelia Harrison PA-C      metoprolol tartrate  50 mg Oral Q12H [facility] Argelia Harrison PA-C      metroNIDAZOLE  500 mg Oral Q12H [facility] Argelia Harrison PA-C      nicotine  1 patch Transdermal Daily Argelia Harrison PA-C      ondansetron  4 mg Intravenous Q6H PRN Eva Berry PA-C      senna  1 tablet Oral HS PRN Eva Berry PA-C      traZODone  200 mg Oral HS Macey Jobs, PA-C          Today, Patient Was Seen By: Marion Waldron MD

## 2021-03-24 NOTE — ASSESSMENT & PLAN NOTE
· Patient was found to have anion gap metabolic acidosis with CO2 16, anion gap 21- repeat CO2 17 and gap 17  · Likely in the setting of starvation ketosis as patient notes she has not eaten anything for 5 days  · And gap improving, 14  · Continue with IV fluids

## 2021-03-24 NOTE — ASSESSMENT & PLAN NOTE
· AST elevated at 58, ALT 1399, alk-phos 214 and T bili 1 22  · GI consult  · IV fluid hydration  · Likely secondary to starvation ketosis

## 2021-03-24 NOTE — CONSULTS
Consultation - [facility]  Ammon Wolfe 50 y o  female MRN: [id number]  Unit/Bed#: ED 28 Encounter: [id number]        Inpatient consult to gastroenterology  Consult performed by: Camryn Blair PA-C  Consult ordered by: Emperatriz Hu PA-C          Reason for Consult / Principal Problem:  Elevated liver enzymes    HPI:  53-year-old female with history of bipolar disorder, anxiety, type 2 diabetes mellitus, hypertension, hypothyroidism presenting to the ED with anxiety and suicidal ideation  GI has been consulted due to elevated liver enzymes  Patient reports poor appetite for the past 4-5 days  She did not eat any solid food  She was forcing herself to drink lots of water  Yesterday, she had multiple episodes of nonbloody emesis  She denies any further vomiting today  She was able to eat some lunch  She denies abdominal pain  She denies fevers and chills  She states she was taking ibuprofen 1 tablet/capsule every 4 hours due to vaginal infection  She took ibuprofen for a few days, but states she stopped about 3 days ago  She denies any Tylenol use  She denies any new medications or herbal supplements  She has history of heavy drinking but states she quit drinking 4 months ago  She does not use any recreational drugs  She does smoke cigarettes  She had an EGD and colonoscopy about 3 years ago with polyps removed  REVIEW OF SYSTEMS:    CONSTITUTIONAL: Denies any fever, chills  + Fair appetite, and no recent weight loss  HEENT: No earache or tinnitus  Denies hearing loss or visual disturbances  CARDIOVASCULAR: No chest pain or palpitations  RESPIRATORY: Denies any cough, hemoptysis, shortness of breath or dyspnea on exertion  GASTROINTESTINAL: As noted in the History of Present Illness  GENITOURINARY: No problems with urination  Denies any hematuria or dysuria  + Vaginal discomfort  NEUROLOGIC: No dizziness or vertigo, denies headaches     MUSCULOSKELETAL: Denies any muscle or joint pain  SKIN: Denies skin rashes or itching  ENDOCRINE: Denies excessive thirst  Denies intolerance to heat or cold  PSYCHOSOCIAL: + Anxiety      Historical Information   Past Medical History:   Diagnosis Date    Addiction to drug ([place] [address] )     Adjustment disorder     Alcohol abuse     Alcoholism ([place] [address] )     Anxiety     Bipolar disorder (HCC)     Bowel obstruction ([place] [address] )     Depression     Diabetes type 2, controlled ([place] [address] )     Disease of thyroid gland     Gastritis     Head injury     Heart palpitations     Hyperlipidemia     Hypertension     Hypothyroidism     Psychiatric illness     PTSD (post-traumatic stress disorder)     Seizure ([place] [address] )     Seizures ([place] [address] )     Sleep difficulties     Substance abuse ([place] [address] )     Suicide attempt ([place] [address] )     Withdrawal symptoms, drug or narcotic ([place] [address] )      Past Surgical History:   Procedure Laterality Date    ABDOMINAL SURGERY      APPENDECTOMY      BOWEL RESECTION      CHOLECYSTECTOMY      ESOPHAGOGASTRODUODENOSCOPY N/A 5/18/2018    Procedure: ESOPHAGOGASTRODUODENOSCOPY (EGD) with bx;  Surgeon: Yehuda Maloney DO;  Location: [facility];   Service: Gastroenterology    HYSTERECTOMY      KNEE SURGERY Bilateral      Social History   Social History     Substance and Sexual Activity   Alcohol Use Not Currently    Alcohol/week: 6 0 standard drinks    Types: 6 Cans of beer per week    Frequency: 4 or more times a week    Drinks per session: 5 or 6    Binge frequency: Weekly    Comment: last drink on 08/15/20     Social History     Substance and Sexual Activity   Drug Use No     Social History     Tobacco Use   Smoking Status Current Every Day Smoker    Packs/day: 0 50    Years: 25 00    Pack years: 12 50    Types: Cigarettes   Smokeless Tobacco Never Used     Family History   Problem Relation Age of Onset    Diabetes Father     Bipolar disorder Mother        Meds/Allergies     (Not in a hospital admission)    Current Facility-Administered Medications   Medication Dose Route Frequency    calcium carbonate (TUMS) chewable tablet 1,000 mg  1,000 mg Oral Daily PRN    cloNIDine (CATAPRES) tablet 0 1 mg  0 1 mg Oral HS    dextrose 5 % and sodium chloride 0 9 % infusion  125 mL/hr Intravenous Continuous    DULoxetine (CYMBALTA) delayed release capsule 30 mg  30 mg Oral Daily    enoxaparin (LOVENOX) subcutaneous injection 40 mg  40 mg Subcutaneous Z86Y NACHO    folic acid (FOLVITE) tablet 1 mg  1 mg Oral Daily    gabapentin (NEURONTIN) capsule 800 mg  800 mg Oral TID    hydrOXYzine HCL (ATARAX) tablet 25 mg  25 mg Oral BID PRN    ibuprofen (MOTRIN) tablet 400 mg  400 mg Oral Q6H PRN    insulin lispro (HumaLOG) 100 units/mL subcutaneous injection 1-6 Units  1-6 Units Subcutaneous TID AC    insulin lispro (HumaLOG) 100 units/mL subcutaneous injection 1-6 Units  1-6 Units Subcutaneous HS    levothyroxine tablet 25 mcg  25 mcg Oral Early Morning    metoprolol tartrate (LOPRESSOR) tablet 50 mg  50 mg Oral Q12H [facility]    metroNIDAZOLE (FLAGYL) tablet 500 mg  500 mg Oral Q12H [facility]    nicotine (NICODERM CQ) 14 mg/24hr TD 24 hr patch 1 patch  1 patch Transdermal Daily    ondansetron (ZOFRAN) injection 4 mg  4 mg Intravenous Q6H PRN    senna (SENOKOT) tablet 8 6 mg  1 tablet Oral HS PRN    traZODone (DESYREL) tablet 200 mg  200 mg Oral HS       Allergies   Allergen Reactions    Latex Rash           Objective     Blood pressure 133/73, pulse 98, temperature 98 2 °F (36 8 °C), temperature source Oral, resp  rate 18, height 5' 2 5" (1 588 m), weight 86 6 kg (190 lb 14 7 oz), SpO2 97 %, not currently breastfeeding        Intake/Output Summary (Last 24 hours) at 3/24/2021 1109  Last data filed at 3/23/2021 2206  Gross per 24 hour   Intake 1000 ml   Output --   Net 1000 ml         PHYSICAL EXAM:      General Appearance:   Alert female, flat affect, cooperative, no distress, appears stated age    HEENT:   Normocephalic, atraumatic, anicteric      Neck:  Supple, symmetrical, trachea midline, no adenopathy   Lungs:   Clear to auscultation bilaterally   Heart[de-identified]   S1 and S2 normal; regular rate and rhythm   Abdomen:   Soft, non-tender, non-distended; normal bowel sounds   Genitalia:   Deferred    Rectal:   Deferred    Extremities:  No cyanosis, clubbing or edema    Pulses:  2+ and symmetric all extremities    Skin:  Skin color, texture, turgor normal, no rashes or lesions    Lymph nodes:  No palpable cervical, axillary or inguinal lymphadenopathy        Lab Results:   Results from last 7 days   Lab Units 03/24/21  1100 03/23/21  1923   WBC Thousand/uL 8 43 8 23   HEMOGLOBIN g/dL 11 3* 12 5   HEMATOCRIT % 34 7* 39 2   PLATELETS Thousands/uL 336 338   LYMPHO PCT %  --  26   MONO PCT %  --  15*   EOS PCT %  --  3     Results from last 7 days   Lab Units 03/24/21  0202  03/23/21  1923   POTASSIUM mmol/L 3 3*   < > 3 2*   CHLORIDE mmol/L 100   < > 95*   CO2 mmol/L 20*   < > 16*   BUN mg/dL 4*   < > 4*   CREATININE mg/dL 0 79   < > 0 76   CALCIUM mg/dL 8 2*   < > 9 2   ALK PHOS U/L  --   --  214*   ALT U/L  --   --  1,399*   AST U/L  --   --  58*    < > = values in this interval not displayed  Imaging Studies: I have personally reviewed pertinent imaging studies  Ct Abdomen Pelvis With Contrast    Result Date: 3/23/2021  Impression: 1  No acute findings identified in the abdomen or pelvis  2   Interval increase in the size of bilateral external iliac lymph nodes and bilateral inguinal lymph nodes as above, nonspecific  Workstation performed: [id number]       ASSESSMENT and PLAN:      53-year-old female with history of bipolar disorder, anxiety, type 2 diabetes mellitus, hypertension, hypothyroidism presenting to the ED with anxiety and suicidal ideation seen by GI for elevated liver enzymes  1) Elevated liver enzymes: Labs on presentation revealed significant elevation of ALT 1399, AST 58, alk-phos 214, total bili 1 22   Labs are down trending today , AST 41, alk-phos 174, total bili 0 82  CT abdomen pelvis with IV contrast shows no acute findings: gallbladder surgically absent, benign liver cysts, hepatic contour is normal, no biliary dilatation noted  She reported poor appetite, poor oral intake except for water, and vomiting yesterday which has since resolved  She is now tolerating regular diet  She was taking ibuprofen (about 1200 mg total daily) for a few days prior to admission  She denies any new medications, alcohol, drug use  Differential diagnosis includes ischemic liver injury, viral hepatitis, drug-induced liver injury, autoimmune liver disease     -Fortunately, her liver enzymes are down trending without any specific intervention  -Check acute viral hepatitis labs  -Monitor liver enzymes daily   -If enzymes remain elevated, will do more extensive lab work-up   -Continue supportive care with IVF   -Continue regular diet as tolerated  -Avoid NSAIDs    Patient was seen and examined by Dr Betsy Aschoff  All cast medical decisions were made by Dr Betsy Aschoff  Thank you for allowing us to participate in the care of this present patient  We will follow-up with you closely

## 2021-03-25 LAB
ALBUMIN SERPL BCP-MCNC: 2.5 G/DL (ref 3.5–5)
ALP SERPL-CCNC: 136 U/L (ref 46–116)
ALT SERPL W P-5'-P-CCNC: 623 U/L (ref 12–78)
ANION GAP SERPL CALCULATED.3IONS-SCNC: 12 MMOL/L (ref 4–13)
AST SERPL W P-5'-P-CCNC: 33 U/L (ref 5–45)
BASOPHILS # BLD AUTO: 0.04 THOUSANDS/ΜL (ref 0–0.1)
BASOPHILS NFR BLD AUTO: 1 % (ref 0–1)
BILIRUB SERPL-MCNC: 0.64 MG/DL (ref 0.2–1)
BUN SERPL-MCNC: 2 MG/DL (ref 5–25)
CALCIUM ALBUM COR SERPL-MCNC: 9.9 MG/DL (ref 8.3–10.1)
CALCIUM SERPL-MCNC: 8.7 MG/DL (ref 8.3–10.1)
CHLORIDE SERPL-SCNC: 107 MMOL/L (ref 100–108)
CMV IGG SERPL IA-ACNC: 5.3 U/ML (ref 0–0.59)
CMV IGM SERPL IA-ACNC: <30 AU/ML (ref 0–29.9)
CO2 SERPL-SCNC: 22 MMOL/L (ref 21–32)
CREAT SERPL-MCNC: 0.57 MG/DL (ref 0.6–1.3)
EBV NA IGG SER IA-ACNC: >600 U/ML (ref 0–17.9)
EBV VCA IGG SER IA-ACNC: 177 U/ML (ref 0–17.9)
EBV VCA IGM SER IA-ACNC: 64.6 U/ML (ref 0–35.9)
EOSINOPHIL # BLD AUTO: 0.15 THOUSAND/ΜL (ref 0–0.61)
EOSINOPHIL NFR BLD AUTO: 2 % (ref 0–6)
ERYTHROCYTE [DISTWIDTH] IN BLOOD BY AUTOMATED COUNT: 15.9 % (ref 11.6–15.1)
GFR SERPL CREATININE-BSD FRML MDRD: 106 ML/MIN/1.73SQ M
GLUCOSE SERPL-MCNC: 253 MG/DL (ref 65–140)
GLUCOSE SERPL-MCNC: 255 MG/DL (ref 65–140)
GLUCOSE SERPL-MCNC: 257 MG/DL (ref 65–140)
GLUCOSE SERPL-MCNC: 260 MG/DL (ref 65–140)
GLUCOSE SERPL-MCNC: 281 MG/DL (ref 65–140)
HCT VFR BLD AUTO: 33.2 % (ref 34.8–46.1)
HGB BLD-MCNC: 10.6 G/DL (ref 11.5–15.4)
IMM GRANULOCYTES # BLD AUTO: 0.25 THOUSAND/UL (ref 0–0.2)
IMM GRANULOCYTES NFR BLD AUTO: 4 % (ref 0–2)
INTERPRETATION: ABNORMAL
LYMPHOCYTES # BLD AUTO: 1.94 THOUSANDS/ΜL (ref 0.6–4.47)
LYMPHOCYTES NFR BLD AUTO: 27 % (ref 14–44)
MCH RBC QN AUTO: 31.1 PG (ref 26.8–34.3)
MCHC RBC AUTO-ENTMCNC: 31.9 G/DL (ref 31.4–37.4)
MCV RBC AUTO: 97 FL (ref 82–98)
MONOCYTES # BLD AUTO: 0.98 THOUSAND/ΜL (ref 0.17–1.22)
MONOCYTES NFR BLD AUTO: 14 % (ref 4–12)
NEUTROPHILS # BLD AUTO: 3.73 THOUSANDS/ΜL (ref 1.85–7.62)
NEUTS SEG NFR BLD AUTO: 52 % (ref 43–75)
NRBC BLD AUTO-RTO: 0 /100 WBCS
PLATELET # BLD AUTO: 340 THOUSANDS/UL (ref 149–390)
PMV BLD AUTO: 9.5 FL (ref 8.9–12.7)
POTASSIUM SERPL-SCNC: 2.8 MMOL/L (ref 3.5–5.3)
PROT SERPL-MCNC: 6 G/DL (ref 6.4–8.2)
RBC # BLD AUTO: 3.41 MILLION/UL (ref 3.81–5.12)
SODIUM SERPL-SCNC: 141 MMOL/L (ref 136–145)
WBC # BLD AUTO: 7.09 THOUSAND/UL (ref 4.31–10.16)

## 2021-03-25 PROCEDURE — 80053 COMPREHEN METABOLIC PANEL: CPT | Performed by: INTERNAL MEDICINE

## 2021-03-25 PROCEDURE — 85025 COMPLETE CBC W/AUTO DIFF WBC: CPT | Performed by: INTERNAL MEDICINE

## 2021-03-25 PROCEDURE — 82948 REAGENT STRIP/BLOOD GLUCOSE: CPT

## 2021-03-25 PROCEDURE — 99232 SBSQ HOSP IP/OBS MODERATE 35: CPT | Performed by: INTERNAL MEDICINE

## 2021-03-25 PROCEDURE — G0426 INPT/ED TELECONSULT50: HCPCS | Performed by: PSYCHIATRY & NEUROLOGY

## 2021-03-25 RX ORDER — ARIPIPRAZOLE 5 MG/1
5 TABLET ORAL DAILY
Status: DISCONTINUED | OUTPATIENT
Start: 2021-03-25 | End: 2021-03-31 | Stop reason: HOSPADM

## 2021-03-25 RX ORDER — HYDROXYZINE HYDROCHLORIDE 25 MG/1
50 TABLET, FILM COATED ORAL 2 TIMES DAILY PRN
Status: DISCONTINUED | OUTPATIENT
Start: 2021-03-25 | End: 2021-03-31 | Stop reason: HOSPADM

## 2021-03-25 RX ORDER — POTASSIUM CHLORIDE 14.9 MG/ML
20 INJECTION INTRAVENOUS ONCE
Status: COMPLETED | OUTPATIENT
Start: 2021-03-25 | End: 2021-03-25

## 2021-03-25 RX ORDER — POTASSIUM CHLORIDE 20 MEQ/1
40 TABLET, EXTENDED RELEASE ORAL ONCE
Status: COMPLETED | OUTPATIENT
Start: 2021-03-25 | End: 2021-03-25

## 2021-03-25 RX ADMIN — ENOXAPARIN SODIUM 40 MG: 40 INJECTION SUBCUTANEOUS at 09:39

## 2021-03-25 RX ADMIN — CLONIDINE HYDROCHLORIDE 0.1 MG: 0.1 TABLET ORAL at 21:46

## 2021-03-25 RX ADMIN — GABAPENTIN 800 MG: 400 CAPSULE ORAL at 16:28

## 2021-03-25 RX ADMIN — Medication 1 PATCH: at 09:37

## 2021-03-25 RX ADMIN — DEXTROSE AND SODIUM CHLORIDE 125 ML/HR: 5; .9 INJECTION, SOLUTION INTRAVENOUS at 09:48

## 2021-03-25 RX ADMIN — DULOXETINE HYDROCHLORIDE 30 MG: 30 CAPSULE, DELAYED RELEASE ORAL at 09:38

## 2021-03-25 RX ADMIN — POTASSIUM CHLORIDE 40 MEQ: 1500 TABLET, EXTENDED RELEASE ORAL at 09:37

## 2021-03-25 RX ADMIN — INSULIN LISPRO 3 UNITS: 100 INJECTION, SOLUTION INTRAVENOUS; SUBCUTANEOUS at 09:40

## 2021-03-25 RX ADMIN — METOPROLOL TARTRATE 50 MG: 50 TABLET, FILM COATED ORAL at 21:43

## 2021-03-25 RX ADMIN — HYDROXYZINE HYDROCHLORIDE 25 MG: 25 TABLET, FILM COATED ORAL at 11:51

## 2021-03-25 RX ADMIN — ARIPIPRAZOLE 5 MG: 5 TABLET ORAL at 14:53

## 2021-03-25 RX ADMIN — GABAPENTIN 800 MG: 400 CAPSULE ORAL at 21:43

## 2021-03-25 RX ADMIN — METOPROLOL TARTRATE 50 MG: 50 TABLET, FILM COATED ORAL at 09:37

## 2021-03-25 RX ADMIN — TRAZODONE HYDROCHLORIDE 200 MG: 100 TABLET ORAL at 21:43

## 2021-03-25 RX ADMIN — METRONIDAZOLE 500 MG: 500 TABLET ORAL at 09:38

## 2021-03-25 RX ADMIN — LEVOTHYROXINE SODIUM 25 MCG: 25 TABLET ORAL at 05:27

## 2021-03-25 RX ADMIN — INSULIN LISPRO 3 UNITS: 100 INJECTION, SOLUTION INTRAVENOUS; SUBCUTANEOUS at 16:28

## 2021-03-25 RX ADMIN — FOLIC ACID 1 MG: 1 TABLET ORAL at 09:38

## 2021-03-25 RX ADMIN — GABAPENTIN 800 MG: 400 CAPSULE ORAL at 09:37

## 2021-03-25 RX ADMIN — INSULIN LISPRO 3 UNITS: 100 INJECTION, SOLUTION INTRAVENOUS; SUBCUTANEOUS at 11:52

## 2021-03-25 RX ADMIN — INSULIN LISPRO 3 UNITS: 100 INJECTION, SOLUTION INTRAVENOUS; SUBCUTANEOUS at 21:47

## 2021-03-25 RX ADMIN — METRONIDAZOLE 500 MG: 500 TABLET ORAL at 21:43

## 2021-03-25 RX ADMIN — POTASSIUM CHLORIDE 20 MEQ: 14.9 INJECTION, SOLUTION INTRAVENOUS at 09:38

## 2021-03-25 NOTE — ASSESSMENT & PLAN NOTE
· AST elevated at 58, ALT 1399, alk-phos 214 and T bili 1 22  · CT abdomen pelvis unremarkable  · GI consultation appreciated  · LFTs trending down  · Acute hepatitis panel normal  · Will monitor LFTs

## 2021-03-25 NOTE — ASSESSMENT & PLAN NOTE
· Patient also presented to the hospital with complaint of anxiety with suicidal ideation  ·  psychiatry consultation appreciated  · Psychiatry recommending to discontinue one-to-one  · Continue Cymbalta 30 mg daily, gabapentin 100 mg t i d , clonidine 0 1 mg q h s , trazodone 100 mg q h s   · started on Abilify 5 mg HS  · Atarax increased to 50 mg q 12 hours  · Patient signed a to 1 and is appropriate for inpatient psych admission

## 2021-03-25 NOTE — ASSESSMENT & PLAN NOTE
· Patient was found to have anion gap metabolic acidosis with CO2 16, anion gap 21- repeat CO2 17 and gap 17  · Likely in the setting of starvation ketosis as patient notes she has not eaten anything for 5 days  · Anion gap improved  · Continue with IV fluids

## 2021-03-25 NOTE — PROGRESS NOTES
[address]  Progress Note Bayron Cruz 1965, 50 y o  female MRN: [id number]  Unit/Bed#: E5 -01 Encounter: [id number]  Primary Care Provider: YONG Mckee   Date and time admitted to hospital: 3/23/2021  9:12 AM    * Increased anion gap metabolic acidosis  Assessment & Plan  · Patient was found to have anion gap metabolic acidosis with CO2 16, anion gap 21- repeat CO2 17 and gap 17  · Likely in the setting of starvation ketosis as patient notes she has not eaten anything for 5 days  · Anion gap improved  · Continue with IV fluids    UTI (urinary tract infection)  Assessment & Plan  · UA with numerable WBCs and Trichomonas  · Continue Flagyl  · Follow-up urine culture  · Patient states she informed her sexual partner who will also be treated and will follow outpatient    Tobacco abuse  Assessment & Plan  · Nicotine patch while inpatient  · Tobacco cessation education    Transaminitis  Assessment & Plan  · AST elevated at 58, ALT 1399, alk-phos 214 and T bili 1 22  · CT abdomen pelvis unremarkable  · GI consultation appreciated  · LFTs trending down  · Acute hepatitis panel normal  · Will monitor LFTs    Acquired hypothyroidism  Assessment & Plan  · Continue levothyroxine    Type 2 diabetes mellitus without complication, without long-term current use of insulin [facility])  Assessment & Plan  Lab Results   Component Value Date    HGBA1C 6 8 (H) 09/02/2020       Recent Labs     03/24/21  2120 03/25/21  0721 03/25/21  1120 03/25/21  1516   POCGLU 263* 260* 255* 257*       Blood Sugar Average: Last 72 hrs:  · (P) 260 625 diet controlled outpatient  · Sliding-scale insulin coverage with Accu-Cheks while inpatient    Bipolar disorder current episode depressed ([facility] [address] )  Assessment & Plan  · Patient also presented to the hospital with complaint of anxiety with suicidal ideation  ·  psychiatry consultation appreciated  · Psychiatry recommending to discontinue one-to-one  · Continue Cymbalta 30 mg daily, gabapentin 100 mg t i d , clonidine 0 1 mg q h s , trazodone 100 mg q h s   · started on Abilify 5 mg HS  · Atarax increased to 50 mg q 12 hours  · Patient signed a to 1 and is appropriate for inpatient psych admission    Essential hypertension  Assessment & Plan  · BP elevated at time of admission  · Continue Lopressor and Catapres          VTE Pharmacologic Prophylaxis:   Pharmacologic:  Lovenox    Patient Centered Rounds: I have performed bedside rounds with nursing staff today  Time Spent for Care: 20 minutes  More than 50% of total time spent on counseling and coordination of care as described above  Current Length of Stay: 2 day(s)    Current Patient Status: Inpatient   Certification Statement: The patient will continue to require additional inpatient hospital stay due to Pending placement to inpatient psych    Discharge Plan / Estimated Discharge Date: TBD    Code Status: Level 1 - Full Code      Subjective:   Patient seen and examined at bedside, denies any abdominal pain, nausea, vomiting  Has a poor appetite, encourage to increase nutrition    Objective:     Vitals:   Temp (24hrs), Av 1 °F (36 7 °C), Min:98 °F (36 7 °C), Max:98 2 °F (36 8 °C)    Temp:  [98 °F (36 7 °C)-98 2 °F (36 8 °C)] 98 °F (36 7 °C)  HR:  [] 93  Resp:  [18] 18  BP: (122-162)/(75-97) 162/97  SpO2:  [95 %-98 %] 98 %  Body mass index is 34 36 kg/m²  Input and Output Summary (last 24 hours):     No intake or output data in the 24 hours ending 21    Physical Exam:    Constitutional: Patient is oriented to person, place and time, no acute distress  HEENT:  Normocephalic, atraumatic  Cardiovascular: Normal S1S2, RRR, No murmurs/rubs/gallops appreciated  Pulmonary:  Bilateral air entry, No rhonchi/rales/wheezing appreciated  Abdominal: Soft, Bowel sounds present, Non-tender, Non-distended  Extremities:  No cyanosis, clubbing or edema     Neurological: Cranial nerves II-XII grossly intact, sensation intact, otherwise no focal neurological symptoms  Skin:  Warm, dry    Additional Data:     Labs:    Results from last 7 days   Lab Units 03/25/21  0602   WBC Thousand/uL 7 09   HEMOGLOBIN g/dL 10 6*   HEMATOCRIT % 33 2*   PLATELETS Thousands/uL 340   NEUTROS PCT % 52   LYMPHS PCT % 27   MONOS PCT % 14*   EOS PCT % 2     Results from last 7 days   Lab Units 03/25/21  0602   POTASSIUM mmol/L 2 8*   CHLORIDE mmol/L 107   CO2 mmol/L 22   BUN mg/dL 2*   CREATININE mg/dL 0 57*   CALCIUM mg/dL 8 7   ALK PHOS U/L 136*   ALT U/L 623*   AST U/L 33            I Have Reviewed All Lab Data Listed Above          Recent Cultures (last 7 days):     Results from last 7 days   Lab Units 03/23/21  2139   URINE CULTURE  >100,000 cfu/ml Gram-positive dion*  <10,000 cfu/ml        Last 24 Hours Medication List:   Current Facility-Administered Medications   Medication Dose Route Frequency Provider Last Rate    ARIPiprazole  5 mg Oral Daily Janene Litter, DO      calcium carbonate  1,000 mg Oral Daily PRN Anthony Oleary PA-C      cloNIDine  0 1 mg Oral HS Argelia Harrison PA-C      dextrose 5 % and sodium chloride 0 9 %  125 mL/hr Intravenous Continuous Richard Ventura PA-C 125 mL/hr (03/25/21 0948)    DULoxetine  30 mg Oral Daily Lyssa Zendejas PA-C      enoxaparin  40 mg Subcutaneous Q24H [address] Argelia ARACELIS Harrison      folic acid  1 mg Oral Daily ARACELIS Davis      gabapentin  800 mg Oral TID ARACELIS Davis      hydrOXYzine HCL  50 mg Oral BID PRN Janene Litter, DO      ibuprofen  400 mg Oral Q6H PRN Carol Little ARACELIS Harrison      insulin lispro  1-6 Units Subcutaneous TID AC Argelia Smudde, PA-TJ      insulin lispro  1-6 Units Subcutaneous HS Argelia Lazaroudde, PA-C      levothyroxine  25 mcg Oral Early Morning Argelia Smudde, PA-TJ      metoprolol tartrate  50 mg Oral Q12H [address] Argelia Smudde, PA-C      metroNIDAZOLE  500 mg Oral Q12H [address] Argelia Smudde, PA-C      nicotine  1 patch Transdermal Daily Anthony Oleary PA-C  ondansetron  4 mg Intravenous Q6H PRN Zully Vines PA-C      senna  1 tablet Oral HS PRN Zully Vines PA-C      traZODone  200 mg Oral HS Opal Alicea PA-C          Today, Patient Was Seen By: Ainsley Hatchet, MD

## 2021-03-25 NOTE — PLAN OF CARE
Problem: NEUROSENSORY - ADULT  Goal: Achieves stable or improved neurological status  Description: INTERVENTIONS  - Monitor and report changes in neurological status  - Monitor vital signs such as temperature, blood pressure, glucose, and any other labs ordered   - Initiate measures to prevent increased intracranial pressure  - Monitor for seizure activity and implement precautions if appropriate      3/25/2021 0743 by Kyra Nevarez RN  Outcome: Progressing  3/25/2021 0742 by Kyra Nevarez RN  Outcome: Progressing  Goal: Remains free of injury related to seizures activity  Description: INTERVENTIONS  - Maintain airway, patient safety  and administer oxygen as ordered  - Monitor patient for seizure activity, document and report duration and description of seizure to physician/advanced practitioner  - If seizure occurs,  ensure patient safety during seizure  - Reorient patient post seizure  - Seizure pads on all 4 side rails  - Instruct patient/family to notify RN of any seizure activity including if an aura is experienced  - Instruct patient/family to call for assistance with activity based on nursing assessment  - Administer anti-seizure medications if ordered    3/25/2021 0743 by Kyra Nevarez RN  Outcome: Progressing  3/25/2021 0742 by Kyra Nevarez RN  Outcome: Progressing  Goal: Achieves maximal functionality and self care  Description: INTERVENTIONS  - Monitor swallowing and airway patency with patient fatigue and changes in neurological status  - Encourage and assist patient to increase activity and self care     - Encourage visually impaired, hearing impaired and aphasic patients to use assistive/communication devices  3/25/2021 0743 by Kyra Nevarez RN  Outcome: Progressing  3/25/2021 0742 by Kyra Nevarez RN  Outcome: Progressing     Problem: CARDIOVASCULAR - ADULT  Goal: Maintains optimal cardiac output and hemodynamic stability  Description: INTERVENTIONS:  - Monitor I/O, vital signs and rhythm  - Monitor for S/S and trends of decreased cardiac output  - Administer and titrate ordered vasoactive medications to optimize hemodynamic stability  - Assess quality of pulses, skin color and temperature  - Assess for signs of decreased coronary artery perfusion  - Instruct patient to report change in severity of symptoms  3/25/2021 0743 by Tami Moncada RN  Outcome: Progressing  3/25/2021 0742 by Tami Moncada RN  Outcome: Progressing     Problem: GASTROINTESTINAL - ADULT  Goal: Minimal or absence of nausea and/or vomiting  Description: INTERVENTIONS:  - Administer IV fluids if ordered to ensure adequate hydration  - Maintain NPO status until nausea and vomiting are resolved  - Nasogastric tube if ordered  - Administer ordered antiemetic medications as needed  - Provide nonpharmacologic comfort measures as appropriate  - Advance diet as tolerated, if ordered  - Consider nutrition services referral to assist patient with adequate nutrition and appropriate food choices  3/25/2021 0743 by Tami Moncada RN  Outcome: Progressing  3/25/2021 0742 by Tami Moncada RN  Outcome: Progressing  Goal: Maintains or returns to baseline bowel function  Description: INTERVENTIONS:  - Assess bowel function  - Encourage oral fluids to ensure adequate hydration  - Administer IV fluids if ordered to ensure adequate hydration  - Administer ordered medications as needed  - Encourage mobilization and activity  - Consider nutritional services referral to assist patient with adequate nutrition and appropriate food choices  3/25/2021 0743 by Tami Moncada RN  Outcome: Progressing  3/25/2021 0742 by Tami Moncada RN  Outcome: Progressing  Goal: Maintains adequate nutritional intake  Description: INTERVENTIONS:  - Monitor percentage of each meal consumed  - Identify factors contributing to decreased intake, treat as appropriate  - Assist with meals as needed  - Monitor I&O, weight, and lab values if indicated  - Obtain nutrition services referral as needed  3/25/2021 0743 by Shelby De Leon RN  Outcome: Progressing  3/25/2021 0742 by Shelby De Leon RN  Outcome: Progressing     Problem: GENITOURINARY - ADULT  Goal: Maintains or returns to baseline urinary function  Description: INTERVENTIONS:  - Assess urinary function  - Encourage oral fluids to ensure adequate hydration if ordered  - Administer IV fluids as ordered to ensure adequate hydration  - Administer ordered medications as needed  - Offer frequent toileting  - Follow urinary retention protocol if ordered  3/25/2021 0743 by Shelby De Leon RN  Outcome: Progressing  3/25/2021 0742 by Shelby De Leon RN  Outcome: Progressing     Problem: METABOLIC, FLUID AND ELECTROLYTES - ADULT  Goal: Electrolytes maintained within normal limits  Description: INTERVENTIONS:  - Monitor labs and assess patient for signs and symptoms of electrolyte imbalances  - Administer electrolyte replacement as ordered  - Monitor response to electrolyte replacements, including repeat lab results as appropriate  - Instruct patient on fluid and nutrition as appropriate  3/25/2021 0743 by Shelby De Leon RN  Outcome: Progressing  3/25/2021 0742 by Shelby De Leon RN  Outcome: Progressing  Goal: Fluid balance maintained  Description: INTERVENTIONS:  - Monitor labs   - Monitor I/O and WT  - Instruct patient on fluid and nutrition as appropriate  - Assess for signs & symptoms of volume excess or deficit  3/25/2021 0743 by Shelby De Leon RN  Outcome: Progressing  3/25/2021 0742 by Shelby De Leon RN  Outcome: Progressing  Goal: Glucose maintained within target range  Description: INTERVENTIONS:  - Monitor Blood Glucose as ordered  - Assess for signs and symptoms of hyperglycemia and hypoglycemia  - Administer ordered medications to maintain glucose within target range  - Assess nutritional intake and initiate nutrition service referral as needed  3/25/2021 0743 by Shelby De Leon RN  Outcome: Progressing  3/25/2021 0742 by Shelby De Leon RN  Outcome: Progressing Problem: SKIN/TISSUE INTEGRITY - ADULT  Goal: Skin integrity remains intact  Description: INTERVENTIONS  - Identify patients at risk for skin breakdown  - Assess and monitor skin integrity  - Assess and monitor nutrition and hydration status  - Monitor labs (i e  albumin)  - Assess for incontinence   - Turn and reposition patient  - Assist with mobility/ambulation  - Relieve pressure over bony prominences  - Avoid friction and shearing  - Provide appropriate hygiene as needed including keeping skin clean and dry  - Evaluate need for skin moisturizer/barrier cream  - Collaborate with interdisciplinary team (i e  Nutrition, Rehabilitation, etc )   - Patient/family teaching  3/25/2021 4127 by Timoteo Mendoza RN  Outcome: Progressing  3/25/2021 0742 by Timoteo Mendoza RN  Outcome: Progressing  Goal: Oral mucous membranes remain intact  Description: INTERVENTIONS  - Assess oral mucosa and hygiene practices  - Implement preventative oral hygiene regimen  - Implement oral medicated treatments as ordered  - Initiate Nutrition services referral as needed  3/25/2021 0743 by Timoteo Mendoza RN  Outcome: Progressing  3/25/2021 0742 by Timoteo Mendoza RN  Outcome: Progressing     Problem: HEMATOLOGIC - ADULT  Goal: Maintains hematologic stability  Description: INTERVENTIONS  - Assess for signs and symptoms of bleeding or hemorrhage  - Monitor labs  - Administer supportive blood products/factors as ordered and appropriate  3/25/2021 0743 by Timoteo Mendoza RN  Outcome: Progressing  3/25/2021 0742 by Timoteo Mendoza RN  Outcome: Progressing     Problem: MUSCULOSKELETAL - ADULT  Goal: Maintain or return mobility to safest level of function  Description: INTERVENTIONS:  - Assess patient's ability to carry out ADLs; assess patient's baseline for ADL function and identify physical deficits which impact ability to perform ADLs (bathing, care of mouth/teeth, toileting, grooming, dressing, etc )  - Assess/evaluate cause of self-care deficits   - Assess range of motion  - Assess patient's mobility  - Assess patient's need for assistive devices and provide as appropriate  - Encourage maximum independence but intervene and supervise when necessary  - Involve family in performance of ADLs  - Assess for home care needs following discharge   - Consider OT consult to assist with ADL evaluation and planning for discharge  - Provide patient education as appropriate  3/25/2021 0743 by Tim Pandya RN  Outcome: Progressing  3/25/2021 0742 by Tim Pandya RN  Outcome: Progressing  Goal: Maintain proper alignment of affected body part  Description: INTERVENTIONS:  - Support, maintain and protect limb and body alignment  - Provide patient/ family with appropriate education  3/25/2021 0743 by Tim Pandya RN  Outcome: Progressing  3/25/2021 0742 by Tim Pandya RN  Outcome: Progressing     Problem: Potential for Falls  Goal: Patient will remain free of falls  Description: INTERVENTIONS:  - Assess patient frequently for physical needs  -  Identify cognitive and physical deficits and behaviors that affect risk of falls    -  [place] fall precautions as indicated by assessment   - Educate patient/family on patient safety including physical limitations  - Instruct patient to call for assistance with activity based on assessment  - Modify environment to reduce risk of injury  - Consider OT/PT consult to assist with strengthening/mobility  3/25/2021 0743 by Tim Pandya RN  Outcome: Progressing  3/25/2021 0742 by Tim Pandya RN  Outcome: Progressing     Problem: CARDIOVASCULAR - ADULT  Goal: Absence of cardiac dysrhythmias or at baseline rhythm  Description: INTERVENTIONS:  - Continuous cardiac monitoring, vital signs, obtain 12 lead EKG if ordered  - Administer antiarrhythmic and heart rate control medications as ordered  - Monitor electrolytes and administer replacement therapy as ordered  3/25/2021 0743 by Tim Pandya RN  Outcome: Completed  3/25/2021 0742 by Tim Pandya RN  Outcome: Progressing     Problem: RESPIRATORY - ADULT  Goal: Achieves optimal ventilation and oxygenation  Description: INTERVENTIONS:  - Assess for changes in respiratory status  - Assess for changes in mentation and behavior  - Position to facilitate oxygenation and minimize respiratory effort  - Oxygen administered by appropriate delivery if ordered  - Initiate smoking cessation education as indicated  - Encourage broncho-pulmonary hygiene including cough, deep breathe, Incentive Spirometry  - Assess the need for suctioning and aspirate as needed  - Assess and instruct to report SOB or any respiratory difficulty  - Respiratory Therapy support as indicated  3/25/2021 0743 by Nabeel Skinner RN  Outcome: Completed  3/25/2021 0742 by Nabeel Skinner RN  Outcome: Progressing     Problem: GASTROINTESTINAL - ADULT  Goal: Establish and maintain optimal ostomy function  Description: INTERVENTIONS:  - Assess bowel function  - Encourage oral fluids to ensure adequate hydration  - Administer IV fluids if ordered to ensure adequate hydration   - Administer ordered medications as needed  - Encourage mobilization and activity  - Nutrition services referral to assist patient with appropriate food choices  - Assess stoma site  - Consider wound care consult   3/25/2021 0743 by Nabeel Skinner RN  Outcome: Completed  3/25/2021 0742 by Nabeel Skinner RN  Outcome: Progressing     Problem: GENITOURINARY - ADULT  Goal: Absence of urinary retention  Description: INTERVENTIONS:  - Assess patients ability to void and empty bladder  - Monitor I/O  - Bladder scan as needed  - Discuss with physician/AP medications to alleviate retention as needed  - Discuss catheterization for long term situations as appropriate  3/25/2021 0743 by Nabeel Skinner RN  Outcome: Completed  3/25/2021 0742 by Nabeel Skinner RN  Outcome: Progressing  Goal: Urinary catheter remains patent  Description: INTERVENTIONS:  - Assess patency of urinary catheter  - If patient has a chronic mancilla, consider changing catheter if non-functioning  - Follow guidelines for intermittent irrigation of non-functioning urinary catheter  3/25/2021 0743 by Neha Avalos RN  Outcome: Completed  3/25/2021 0742 by Neha Avalos RN  Outcome: Progressing     Problem: SKIN/TISSUE INTEGRITY - ADULT  Goal: Incision(s), wounds(s) or drain site(s) healing without S/S of infection  Description: INTERVENTIONS  - Assess and document risk factors for skin impairment   - Assess and document dressing, incision, wound bed, drain sites and surrounding tissue  - Consider nutrition services referral as needed  - Oral mucous membranes remain intact  - Provide patient/ family education  3/25/2021 0743 by Neha Avalos RN  Outcome: Completed  3/25/2021 0742 by Neha Avalos RN  Outcome: Progressing

## 2021-03-25 NOTE — ASSESSMENT & PLAN NOTE
Lab Results   Component Value Date    HGBA1C 6 8 (H) 09/02/2020       Recent Labs     03/24/21  2120 03/25/21  0721 03/25/21  1120 03/25/21  1516   POCGLU 263* 260* 255* 257*       Blood Sugar Average: Last 72 hrs:  · (P) 260 625 diet controlled outpatient  · Sliding-scale insulin coverage with Accu-Cheks while inpatient

## 2021-03-25 NOTE — ASSESSMENT & PLAN NOTE
· UA with numerable WBCs and Trichomonas  · Continue Flagyl  · Follow-up urine culture  · Patient states she informed her sexual partner who will also be treated and will follow outpatient
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
## 2021-03-26 PROBLEM — R19.7 DIARRHEA: Status: ACTIVE | Noted: 2021-03-26

## 2021-03-26 LAB
ALBUMIN SERPL BCP-MCNC: 2.5 G/DL (ref 3.5–5)
ALP SERPL-CCNC: 121 U/L (ref 46–116)
ALT SERPL W P-5'-P-CCNC: 470 U/L (ref 12–78)
ANION GAP SERPL CALCULATED.3IONS-SCNC: 9 MMOL/L (ref 4–13)
AST SERPL W P-5'-P-CCNC: 31 U/L (ref 5–45)
BACTERIA UR CULT: NORMAL
BACTERIA UR CULT: NORMAL
BASOPHILS # BLD AUTO: 0.04 THOUSANDS/ΜL (ref 0–0.1)
BASOPHILS NFR BLD AUTO: 1 % (ref 0–1)
BILIRUB SERPL-MCNC: 0.57 MG/DL (ref 0.2–1)
BUN SERPL-MCNC: 1 MG/DL (ref 5–25)
CALCIUM ALBUM COR SERPL-MCNC: 9.7 MG/DL (ref 8.3–10.1)
CALCIUM SERPL-MCNC: 8.5 MG/DL (ref 8.3–10.1)
CHLORIDE SERPL-SCNC: 107 MMOL/L (ref 100–108)
CO2 SERPL-SCNC: 24 MMOL/L (ref 21–32)
CREAT SERPL-MCNC: 0.46 MG/DL (ref 0.6–1.3)
EOSINOPHIL # BLD AUTO: 0.12 THOUSAND/ΜL (ref 0–0.61)
EOSINOPHIL NFR BLD AUTO: 2 % (ref 0–6)
ERYTHROCYTE [DISTWIDTH] IN BLOOD BY AUTOMATED COUNT: 15.9 % (ref 11.6–15.1)
GFR SERPL CREATININE-BSD FRML MDRD: 114 ML/MIN/1.73SQ M
GLUCOSE SERPL-MCNC: 234 MG/DL (ref 65–140)
GLUCOSE SERPL-MCNC: 243 MG/DL (ref 65–140)
GLUCOSE SERPL-MCNC: 251 MG/DL (ref 65–140)
GLUCOSE SERPL-MCNC: 260 MG/DL (ref 65–140)
GLUCOSE SERPL-MCNC: 278 MG/DL (ref 65–140)
GLUCOSE SERPL-MCNC: 312 MG/DL (ref 65–140)
HCT VFR BLD AUTO: 32.9 % (ref 34.8–46.1)
HGB BLD-MCNC: 10.7 G/DL (ref 11.5–15.4)
HSV1 DNA SPEC QL NAA+PROBE: POSITIVE
HSV2 DNA SPEC QL NAA+PROBE: NEGATIVE
IMM GRANULOCYTES # BLD AUTO: 0.12 THOUSAND/UL (ref 0–0.2)
IMM GRANULOCYTES NFR BLD AUTO: 2 % (ref 0–2)
LYMPHOCYTES # BLD AUTO: 2.18 THOUSANDS/ΜL (ref 0.6–4.47)
LYMPHOCYTES NFR BLD AUTO: 29 % (ref 14–44)
MAGNESIUM SERPL-MCNC: 1.4 MG/DL (ref 1.6–2.6)
MCH RBC QN AUTO: 31.3 PG (ref 26.8–34.3)
MCHC RBC AUTO-ENTMCNC: 32.5 G/DL (ref 31.4–37.4)
MCV RBC AUTO: 96 FL (ref 82–98)
MONOCYTES # BLD AUTO: 0.84 THOUSAND/ΜL (ref 0.17–1.22)
MONOCYTES NFR BLD AUTO: 11 % (ref 4–12)
NEUTROPHILS # BLD AUTO: 4.29 THOUSANDS/ΜL (ref 1.85–7.62)
NEUTS SEG NFR BLD AUTO: 55 % (ref 43–75)
NRBC BLD AUTO-RTO: 0 /100 WBCS
PHOSPHATE SERPL-MCNC: 1.7 MG/DL (ref 2.7–4.5)
PLATELET # BLD AUTO: 327 THOUSANDS/UL (ref 149–390)
PMV BLD AUTO: 9.5 FL (ref 8.9–12.7)
POTASSIUM SERPL-SCNC: 2.8 MMOL/L (ref 3.5–5.3)
PROT SERPL-MCNC: 6 G/DL (ref 6.4–8.2)
RBC # BLD AUTO: 3.42 MILLION/UL (ref 3.81–5.12)
SODIUM SERPL-SCNC: 140 MMOL/L (ref 136–145)
WBC # BLD AUTO: 7.59 THOUSAND/UL (ref 4.31–10.16)

## 2021-03-26 PROCEDURE — 80053 COMPREHEN METABOLIC PANEL: CPT | Performed by: INTERNAL MEDICINE

## 2021-03-26 PROCEDURE — 83735 ASSAY OF MAGNESIUM: CPT | Performed by: INTERNAL MEDICINE

## 2021-03-26 PROCEDURE — 85025 COMPLETE CBC W/AUTO DIFF WBC: CPT | Performed by: INTERNAL MEDICINE

## 2021-03-26 PROCEDURE — 84100 ASSAY OF PHOSPHORUS: CPT | Performed by: INTERNAL MEDICINE

## 2021-03-26 PROCEDURE — 99232 SBSQ HOSP IP/OBS MODERATE 35: CPT | Performed by: INTERNAL MEDICINE

## 2021-03-26 PROCEDURE — 87493 C DIFF AMPLIFIED PROBE: CPT | Performed by: INTERNAL MEDICINE

## 2021-03-26 PROCEDURE — 82948 REAGENT STRIP/BLOOD GLUCOSE: CPT

## 2021-03-26 PROCEDURE — 87505 NFCT AGENT DETECTION GI: CPT | Performed by: INTERNAL MEDICINE

## 2021-03-26 RX ORDER — POTASSIUM CHLORIDE 14.9 MG/ML
20 INJECTION INTRAVENOUS ONCE
Status: COMPLETED | OUTPATIENT
Start: 2021-03-26 | End: 2021-03-26

## 2021-03-26 RX ORDER — MAGNESIUM SULFATE HEPTAHYDRATE 40 MG/ML
2 INJECTION, SOLUTION INTRAVENOUS ONCE
Status: COMPLETED | OUTPATIENT
Start: 2021-03-26 | End: 2021-03-26

## 2021-03-26 RX ORDER — POTASSIUM CHLORIDE 20 MEQ/1
40 TABLET, EXTENDED RELEASE ORAL ONCE
Status: COMPLETED | OUTPATIENT
Start: 2021-03-26 | End: 2021-03-26

## 2021-03-26 RX ORDER — SODIUM CHLORIDE AND POTASSIUM CHLORIDE .9; .15 G/100ML; G/100ML
75 SOLUTION INTRAVENOUS CONTINUOUS
Status: DISCONTINUED | OUTPATIENT
Start: 2021-03-26 | End: 2021-03-28

## 2021-03-26 RX ADMIN — POTASSIUM & SODIUM PHOSPHATES POWDER PACK 280-160-250 MG 1 PACKET: 280-160-250 PACK at 18:43

## 2021-03-26 RX ADMIN — FOLIC ACID 1 MG: 1 TABLET ORAL at 09:13

## 2021-03-26 RX ADMIN — INSULIN LISPRO 3 UNITS: 100 INJECTION, SOLUTION INTRAVENOUS; SUBCUTANEOUS at 07:48

## 2021-03-26 RX ADMIN — GABAPENTIN 800 MG: 400 CAPSULE ORAL at 09:13

## 2021-03-26 RX ADMIN — CLONIDINE HYDROCHLORIDE 0.1 MG: 0.1 TABLET ORAL at 21:17

## 2021-03-26 RX ADMIN — DEXTROSE AND SODIUM CHLORIDE 125 ML/HR: 5; .9 INJECTION, SOLUTION INTRAVENOUS at 05:27

## 2021-03-26 RX ADMIN — TRAZODONE HYDROCHLORIDE 200 MG: 100 TABLET ORAL at 21:18

## 2021-03-26 RX ADMIN — ARIPIPRAZOLE 5 MG: 5 TABLET ORAL at 09:12

## 2021-03-26 RX ADMIN — INSULIN LISPRO 3 UNITS: 100 INJECTION, SOLUTION INTRAVENOUS; SUBCUTANEOUS at 11:43

## 2021-03-26 RX ADMIN — MAGNESIUM SULFATE HEPTAHYDRATE 2 G: 40 INJECTION, SOLUTION INTRAVENOUS at 18:43

## 2021-03-26 RX ADMIN — INSULIN LISPRO 3 UNITS: 100 INJECTION, SOLUTION INTRAVENOUS; SUBCUTANEOUS at 21:19

## 2021-03-26 RX ADMIN — ONDANSETRON 4 MG: 2 INJECTION INTRAMUSCULAR; INTRAVENOUS at 09:26

## 2021-03-26 RX ADMIN — DULOXETINE HYDROCHLORIDE 30 MG: 30 CAPSULE, DELAYED RELEASE ORAL at 09:13

## 2021-03-26 RX ADMIN — HYDROXYZINE HYDROCHLORIDE 50 MG: 25 TABLET, FILM COATED ORAL at 09:26

## 2021-03-26 RX ADMIN — POTASSIUM CHLORIDE 20 MEQ: 14.9 INJECTION, SOLUTION INTRAVENOUS at 09:31

## 2021-03-26 RX ADMIN — Medication 1 PATCH: at 09:14

## 2021-03-26 RX ADMIN — GABAPENTIN 800 MG: 400 CAPSULE ORAL at 21:17

## 2021-03-26 RX ADMIN — SODIUM CHLORIDE AND POTASSIUM CHLORIDE 75 ML/HR: .9; .15 SOLUTION INTRAVENOUS at 15:32

## 2021-03-26 RX ADMIN — METOPROLOL TARTRATE 50 MG: 50 TABLET, FILM COATED ORAL at 09:12

## 2021-03-26 RX ADMIN — METRONIDAZOLE 500 MG: 500 TABLET ORAL at 09:13

## 2021-03-26 RX ADMIN — LEVOTHYROXINE SODIUM 25 MCG: 25 TABLET ORAL at 05:25

## 2021-03-26 RX ADMIN — METRONIDAZOLE 500 MG: 500 TABLET ORAL at 21:17

## 2021-03-26 RX ADMIN — INSULIN LISPRO 5 UNITS: 100 INJECTION, SOLUTION INTRAVENOUS; SUBCUTANEOUS at 16:34

## 2021-03-26 RX ADMIN — METOPROLOL TARTRATE 50 MG: 50 TABLET, FILM COATED ORAL at 21:18

## 2021-03-26 RX ADMIN — GABAPENTIN 800 MG: 400 CAPSULE ORAL at 15:32

## 2021-03-26 RX ADMIN — POTASSIUM CHLORIDE 40 MEQ: 1500 TABLET, EXTENDED RELEASE ORAL at 09:13

## 2021-03-26 RX ADMIN — ENOXAPARIN SODIUM 40 MG: 40 INJECTION SUBCUTANEOUS at 09:14

## 2021-03-26 NOTE — ASSESSMENT & PLAN NOTE
· Patient also presented to the hospital with complaint of anxiety with suicidal ideation  ·  psychiatry consultation appreciated  · Psychiatry recommending to discontinue one-to-one  · Continue Cymbalta 30 mg daily, gabapentin 100 mg t i d , clonidine 0 1 mg q h s , trazodone 100 mg q h s   · started on Abilify 5 mg HS  · Atarax increased to 50 mg q 12 hours  · Patient signed a 201 and is appropriate for inpatient psych admission
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
## 2021-03-26 NOTE — ASSESSMENT & PLAN NOTE
Lab Results   Component Value Date    HGBA1C 6 8 (H) 09/02/2020       Recent Labs     03/26/21  0715 03/26/21  1123 03/26/21  1408 03/26/21  1610   POCGLU 260* 234* 278* 312*       Blood Sugar Average: Last 72 hrs:  · (P) 263 [id number] diet controlled outpatient  · Sliding-scale insulin coverage with Accu-Cheks while inpatient
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
## 2021-03-26 NOTE — PLAN OF CARE
Problem: NEUROSENSORY - ADULT  Goal: Achieves stable or improved neurological status  Description: INTERVENTIONS  - Monitor and report changes in neurological status  - Monitor vital signs such as temperature, blood pressure, glucose, and any other labs ordered   - Initiate measures to prevent increased intracranial pressure  - Monitor for seizure activity and implement precautions if appropriate      Outcome: Progressing  Goal: Remains free of injury related to seizures activity  Description: INTERVENTIONS  - Maintain airway, patient safety  and administer oxygen as ordered  - Monitor patient for seizure activity, document and report duration and description of seizure to physician/advanced practitioner  - If seizure occurs,  ensure patient safety during seizure  - Reorient patient post seizure  - Seizure pads on all 4 side rails  - Instruct patient/family to notify RN of any seizure activity including if an aura is experienced  - Instruct patient/family to call for assistance with activity based on nursing assessment  - Administer anti-seizure medications if ordered    Outcome: Progressing  Goal: Achieves maximal functionality and self care  Description: INTERVENTIONS  - Monitor swallowing and airway patency with patient fatigue and changes in neurological status  - Encourage and assist patient to increase activity and self care     - Encourage visually impaired, hearing impaired and aphasic patients to use assistive/communication devices  Outcome: Progressing     Problem: CARDIOVASCULAR - ADULT  Goal: Maintains optimal cardiac output and hemodynamic stability  Description: INTERVENTIONS:  - Monitor I/O, vital signs and rhythm  - Monitor for S/S and trends of decreased cardiac output  - Administer and titrate ordered vasoactive medications to optimize hemodynamic stability  - Assess quality of pulses, skin color and temperature  - Assess for signs of decreased coronary artery perfusion  - Instruct patient to report change in severity of symptoms  Outcome: Progressing     Problem: GASTROINTESTINAL - ADULT  Goal: Minimal or absence of nausea and/or vomiting  Description: INTERVENTIONS:  - Administer IV fluids if ordered to ensure adequate hydration  - Maintain NPO status until nausea and vomiting are resolved  - Nasogastric tube if ordered  - Administer ordered antiemetic medications as needed  - Provide nonpharmacologic comfort measures as appropriate  - Advance diet as tolerated, if ordered  - Consider nutrition services referral to assist patient with adequate nutrition and appropriate food choices  Outcome: Progressing  Goal: Maintains or returns to baseline bowel function  Description: INTERVENTIONS:  - Assess bowel function  - Encourage oral fluids to ensure adequate hydration  - Administer IV fluids if ordered to ensure adequate hydration  - Administer ordered medications as needed  - Encourage mobilization and activity  - Consider nutritional services referral to assist patient with adequate nutrition and appropriate food choices  Outcome: Progressing  Goal: Maintains adequate nutritional intake  Description: INTERVENTIONS:  - Monitor percentage of each meal consumed  - Identify factors contributing to decreased intake, treat as appropriate  - Assist with meals as needed  - Monitor I&O, weight, and lab values if indicated  - Obtain nutrition services referral as needed  Outcome: Progressing     Problem: GENITOURINARY - ADULT  Goal: Maintains or returns to baseline urinary function  Description: INTERVENTIONS:  - Assess urinary function  - Encourage oral fluids to ensure adequate hydration if ordered  - Administer IV fluids as ordered to ensure adequate hydration  - Administer ordered medications as needed  - Offer frequent toileting  - Follow urinary retention protocol if ordered  Outcome: Progressing     Problem: METABOLIC, FLUID AND ELECTROLYTES - ADULT  Goal: Electrolytes maintained within normal limits  Description: INTERVENTIONS:  - Monitor labs and assess patient for signs and symptoms of electrolyte imbalances  - Administer electrolyte replacement as ordered  - Monitor response to electrolyte replacements, including repeat lab results as appropriate  - Instruct patient on fluid and nutrition as appropriate  Outcome: Progressing  Goal: Fluid balance maintained  Description: INTERVENTIONS:  - Monitor labs   - Monitor I/O and WT  - Instruct patient on fluid and nutrition as appropriate  - Assess for signs & symptoms of volume excess or deficit  Outcome: Progressing  Goal: Glucose maintained within target range  Description: INTERVENTIONS:  - Monitor Blood Glucose as ordered  - Assess for signs and symptoms of hyperglycemia and hypoglycemia  - Administer ordered medications to maintain glucose within target range  - Assess nutritional intake and initiate nutrition service referral as needed  Outcome: Progressing     Problem: SKIN/TISSUE INTEGRITY - ADULT  Goal: Skin integrity remains intact  Description: INTERVENTIONS  - Identify patients at risk for skin breakdown  - Assess and monitor skin integrity  - Assess and monitor nutrition and hydration status  - Monitor labs (i e  albumin)  - Assess for incontinence   - Turn and reposition patient  - Assist with mobility/ambulation  - Relieve pressure over bony prominences  - Avoid friction and shearing  - Provide appropriate hygiene as needed including keeping skin clean and dry  - Evaluate need for skin moisturizer/barrier cream  - Collaborate with interdisciplinary team (i e  Nutrition, Rehabilitation, etc )   - Patient/family teaching  Outcome: Progressing  Goal: Oral mucous membranes remain intact  Description: INTERVENTIONS  - Assess oral mucosa and hygiene practices  - Implement preventative oral hygiene regimen  - Implement oral medicated treatments as ordered  - Initiate Nutrition services referral as needed  Outcome: Progressing     Problem: HEMATOLOGIC - ADULT  Goal: Maintains hematologic stability  Description: INTERVENTIONS  - Assess for signs and symptoms of bleeding or hemorrhage  - Monitor labs  - Administer supportive blood products/factors as ordered and appropriate  Outcome: Progressing     Problem: MUSCULOSKELETAL - ADULT  Goal: Maintain or return mobility to safest level of function  Description: INTERVENTIONS:  - Assess patient's ability to carry out ADLs; assess patient's baseline for ADL function and identify physical deficits which impact ability to perform ADLs (bathing, care of mouth/teeth, toileting, grooming, dressing, etc )  - Assess/evaluate cause of self-care deficits   - Assess range of motion  - Assess patient's mobility  - Assess patient's need for assistive devices and provide as appropriate  - Encourage maximum independence but intervene and supervise when necessary  - Involve family in performance of ADLs  - Assess for home care needs following discharge   - Consider OT consult to assist with ADL evaluation and planning for discharge  - Provide patient education as appropriate  Outcome: Progressing  Goal: Maintain proper alignment of affected body part  Description: INTERVENTIONS:  - Support, maintain and protect limb and body alignment  - Provide patient/ family with appropriate education  Outcome: Progressing     Problem: Potential for Falls  Goal: Patient will remain free of falls  Description: INTERVENTIONS:  - Assess patient frequently for physical needs  -  Identify cognitive and physical deficits and behaviors that affect risk of falls    -  [place] fall precautions as indicated by assessment   - Educate patient/family on patient safety including physical limitations  - Instruct patient to call for assistance with activity based on assessment  - Modify environment to reduce risk of injury  - Consider OT/PT consult to assist with strengthening/mobility  Outcome: Progressing

## 2021-03-27 LAB
ANION GAP SERPL CALCULATED.3IONS-SCNC: 11 MMOL/L (ref 4–13)
BASOPHILS # BLD AUTO: 0.03 THOUSANDS/ΜL (ref 0–0.1)
BASOPHILS NFR BLD AUTO: 0 % (ref 0–1)
BUN SERPL-MCNC: 1 MG/DL (ref 5–25)
C DIFF TOX B TCDB STL QL NAA+PROBE: NEGATIVE
CALCIUM SERPL-MCNC: 8.8 MG/DL (ref 8.3–10.1)
CAMPYLOBACTER DNA SPEC NAA+PROBE: NORMAL
CHLORIDE SERPL-SCNC: 103 MMOL/L (ref 100–108)
CO2 SERPL-SCNC: 24 MMOL/L (ref 21–32)
CREAT SERPL-MCNC: 0.57 MG/DL (ref 0.6–1.3)
EOSINOPHIL # BLD AUTO: 0.08 THOUSAND/ΜL (ref 0–0.61)
EOSINOPHIL NFR BLD AUTO: 1 % (ref 0–6)
ERYTHROCYTE [DISTWIDTH] IN BLOOD BY AUTOMATED COUNT: 16.4 % (ref 11.6–15.1)
GFR SERPL CREATININE-BSD FRML MDRD: 106 ML/MIN/1.73SQ M
GLUCOSE SERPL-MCNC: 215 MG/DL (ref 65–140)
GLUCOSE SERPL-MCNC: 225 MG/DL (ref 65–140)
GLUCOSE SERPL-MCNC: 234 MG/DL (ref 65–140)
GLUCOSE SERPL-MCNC: 242 MG/DL (ref 65–140)
GLUCOSE SERPL-MCNC: 347 MG/DL (ref 65–140)
HCT VFR BLD AUTO: 33.3 % (ref 34.8–46.1)
HGB BLD-MCNC: 10.8 G/DL (ref 11.5–15.4)
IMM GRANULOCYTES # BLD AUTO: 0.11 THOUSAND/UL (ref 0–0.2)
IMM GRANULOCYTES NFR BLD AUTO: 1 % (ref 0–2)
LYMPHOCYTES # BLD AUTO: 2.5 THOUSANDS/ΜL (ref 0.6–4.47)
LYMPHOCYTES NFR BLD AUTO: 28 % (ref 14–44)
MAGNESIUM SERPL-MCNC: 1.5 MG/DL (ref 1.6–2.6)
MCH RBC QN AUTO: 32 PG (ref 26.8–34.3)
MCHC RBC AUTO-ENTMCNC: 32.4 G/DL (ref 31.4–37.4)
MCV RBC AUTO: 99 FL (ref 82–98)
MONOCYTES # BLD AUTO: 0.82 THOUSAND/ΜL (ref 0.17–1.22)
MONOCYTES NFR BLD AUTO: 9 % (ref 4–12)
NEUTROPHILS # BLD AUTO: 5.39 THOUSANDS/ΜL (ref 1.85–7.62)
NEUTS SEG NFR BLD AUTO: 61 % (ref 43–75)
NRBC BLD AUTO-RTO: 0 /100 WBCS
PLATELET # BLD AUTO: 332 THOUSANDS/UL (ref 149–390)
PMV BLD AUTO: 9.2 FL (ref 8.9–12.7)
POTASSIUM SERPL-SCNC: 3.4 MMOL/L (ref 3.5–5.3)
RBC # BLD AUTO: 3.38 MILLION/UL (ref 3.81–5.12)
SALMONELLA DNA SPEC QL NAA+PROBE: NORMAL
SHIGA TOXIN STX GENE SPEC NAA+PROBE: NORMAL
SHIGELLA DNA SPEC QL NAA+PROBE: NORMAL
SODIUM SERPL-SCNC: 138 MMOL/L (ref 136–145)
WBC # BLD AUTO: 8.93 THOUSAND/UL (ref 4.31–10.16)

## 2021-03-27 PROCEDURE — 82948 REAGENT STRIP/BLOOD GLUCOSE: CPT

## 2021-03-27 PROCEDURE — 80048 BASIC METABOLIC PNL TOTAL CA: CPT | Performed by: INTERNAL MEDICINE

## 2021-03-27 PROCEDURE — 85025 COMPLETE CBC W/AUTO DIFF WBC: CPT | Performed by: INTERNAL MEDICINE

## 2021-03-27 PROCEDURE — 83735 ASSAY OF MAGNESIUM: CPT | Performed by: INTERNAL MEDICINE

## 2021-03-27 PROCEDURE — 99232 SBSQ HOSP IP/OBS MODERATE 35: CPT | Performed by: INTERNAL MEDICINE

## 2021-03-27 RX ORDER — POTASSIUM CHLORIDE 20 MEQ/1
40 TABLET, EXTENDED RELEASE ORAL ONCE
Status: COMPLETED | OUTPATIENT
Start: 2021-03-27 | End: 2021-03-27

## 2021-03-27 RX ORDER — MAGNESIUM SULFATE HEPTAHYDRATE 40 MG/ML
2 INJECTION, SOLUTION INTRAVENOUS ONCE
Status: COMPLETED | OUTPATIENT
Start: 2021-03-27 | End: 2021-03-27

## 2021-03-27 RX ADMIN — GABAPENTIN 800 MG: 400 CAPSULE ORAL at 16:24

## 2021-03-27 RX ADMIN — METRONIDAZOLE 500 MG: 500 TABLET ORAL at 21:15

## 2021-03-27 RX ADMIN — SODIUM CHLORIDE AND POTASSIUM CHLORIDE 75 ML/HR: .9; .15 SOLUTION INTRAVENOUS at 05:41

## 2021-03-27 RX ADMIN — INSULIN LISPRO 2 UNITS: 100 INJECTION, SOLUTION INTRAVENOUS; SUBCUTANEOUS at 11:41

## 2021-03-27 RX ADMIN — INSULIN LISPRO 3 UNITS: 100 INJECTION, SOLUTION INTRAVENOUS; SUBCUTANEOUS at 08:19

## 2021-03-27 RX ADMIN — ENOXAPARIN SODIUM 40 MG: 40 INJECTION SUBCUTANEOUS at 08:20

## 2021-03-27 RX ADMIN — INSULIN LISPRO 5 UNITS: 100 INJECTION, SOLUTION INTRAVENOUS; SUBCUTANEOUS at 21:16

## 2021-03-27 RX ADMIN — DULOXETINE HYDROCHLORIDE 30 MG: 30 CAPSULE, DELAYED RELEASE ORAL at 08:24

## 2021-03-27 RX ADMIN — METRONIDAZOLE 500 MG: 500 TABLET ORAL at 08:24

## 2021-03-27 RX ADMIN — Medication 1 PATCH: at 09:43

## 2021-03-27 RX ADMIN — HYDROXYZINE HYDROCHLORIDE 50 MG: 25 TABLET, FILM COATED ORAL at 03:57

## 2021-03-27 RX ADMIN — METOPROLOL TARTRATE 50 MG: 50 TABLET, FILM COATED ORAL at 21:15

## 2021-03-27 RX ADMIN — MAGNESIUM SULFATE HEPTAHYDRATE 2 G: 40 INJECTION, SOLUTION INTRAVENOUS at 11:37

## 2021-03-27 RX ADMIN — INSULIN LISPRO 3 UNITS: 100 INJECTION, SOLUTION INTRAVENOUS; SUBCUTANEOUS at 17:01

## 2021-03-27 RX ADMIN — POTASSIUM CHLORIDE 40 MEQ: 1500 TABLET, EXTENDED RELEASE ORAL at 09:41

## 2021-03-27 RX ADMIN — FOLIC ACID 1 MG: 1 TABLET ORAL at 08:24

## 2021-03-27 RX ADMIN — HYDROXYZINE HYDROCHLORIDE 50 MG: 25 TABLET, FILM COATED ORAL at 16:24

## 2021-03-27 RX ADMIN — ARIPIPRAZOLE 5 MG: 5 TABLET ORAL at 08:24

## 2021-03-27 RX ADMIN — GABAPENTIN 800 MG: 400 CAPSULE ORAL at 21:15

## 2021-03-27 RX ADMIN — CLONIDINE HYDROCHLORIDE 0.1 MG: 0.1 TABLET ORAL at 21:16

## 2021-03-27 RX ADMIN — TRAZODONE HYDROCHLORIDE 200 MG: 100 TABLET ORAL at 21:16

## 2021-03-27 RX ADMIN — GABAPENTIN 800 MG: 400 CAPSULE ORAL at 08:23

## 2021-03-27 RX ADMIN — METOPROLOL TARTRATE 50 MG: 50 TABLET, FILM COATED ORAL at 08:24

## 2021-03-27 RX ADMIN — LEVOTHYROXINE SODIUM 25 MCG: 25 TABLET ORAL at 03:57

## 2021-03-27 NOTE — PROGRESS NOTES
[facility]  Progress Note [place] 1965, 50 y o  female MRN: [id number]  Unit/Bed#: E5 -01 Encounter: [id number]  Primary Care Provider: YONG Bardales   Date and time admitted to hospital: 3/23/2021  9:12 AM    * Increased anion gap metabolic acidosis  Assessment & Plan  · Patient was found to have anion gap metabolic acidosis with CO2 16, anion gap 21- repeat CO2 17 and gap 17  · Likely in the setting of starvation ketosis as patient notes she has not eaten anything for 5 days  · Anion gap improved  · Continue with IV fluids    Diarrhea  Assessment & Plan  · C diff negative  · Stool enteric pathogen normal    UTI (urinary tract infection)  Assessment & Plan  · UA with numerable WBCs and Trichomonas  · Continue Flagyl D4/5  · Follow-up urine culture  · Patient states she informed her sexual partner who will also be treated and will follow outpatient    Tobacco abuse  Assessment & Plan  · Nicotine patch while inpatient  · Tobacco cessation education    Transaminitis  Assessment & Plan  · AST elevated at 58, ALT 1399, alk-phos 214 and T bili 1 22  · CT abdomen pelvis unremarkable  · GI consultation appreciated  · LFTs trending down  · Acute hepatitis panel normal  · Will monitor LFTs    Acquired hypothyroidism  Assessment & Plan  · Continue levothyroxine    Type 2 diabetes mellitus without complication, without long-term current use of insulin [facility])  Assessment & Plan  Lab Results   Component Value Date    HGBA1C 6 8 (H) 09/02/2020       Recent Labs     03/26/21  2050 03/27/21  0711 03/27/21  1125 03/27/21  1646   POCGLU 251* 225* 215* 234*       Blood Sugar Average: Last 72 hrs:  · (P) 253 5625 diet controlled outpatient  · Sliding-scale insulin coverage with Accu-Cheks while inpatient    Bipolar disorder current episode depressed ([address] )  Assessment & Plan  · Patient also presented to the hospital with complaint of anxiety with suicidal ideation  ·  psychiatry consultation appreciated  · Psychiatry recommending to discontinue one-to-one  · Continue Cymbalta 30 mg daily, gabapentin 100 mg t i d , clonidine 0 1 mg q h s , trazodone 100 mg q h s   · started on Abilify 5 mg HS  · Atarax increased to 50 mg q 12 hours  · Patient signed a 201 and is appropriate for inpatient psych admission    Essential hypertension  Assessment & Plan  · BP elevated at time of admission  · Continue Lopressor and Catapres        VTE Pharmacologic Prophylaxis:   Pharmacologic:  Lovenox    Patient Centered Rounds: I have performed bedside rounds with nursing staff today  Time Spent for Care: 20 minutes  More than 50% of total time spent on counseling and coordination of care as described above  Current Length of Stay: 4 day(s)    Current Patient Status: Inpatient   Certification Statement: The patient will continue to require additional inpatient hospital stay due to Awaiting inpatient psych bed    Discharge Plan / Estimated Discharge Date: TBD    Code Status: Level 1 - Full Code      Subjective:   Patient seen and examined at bedside, denies any abdominal pain, nausea vomiting    Objective:     Vitals:   Temp (24hrs), Av 6 °F (36 4 °C), Min:97 °F (36 1 °C), Max:98 2 °F (36 8 °C)    Temp:  [97 °F (36 1 °C)-98 2 °F (36 8 °C)] 97 °F (36 1 °C)  HR:  [77-86] 82  Resp:  [16-18] 18  BP: (146-155)/(79-96) 153/84  SpO2:  [96 %-97 %] 97 %  Body mass index is 34 36 kg/m²  Input and Output Summary (last 24 hours):     No intake or output data in the 24 hours ending 21 4131    Physical Exam:    Constitutional: Patient is oriented to person, place and time, no acute distress  HEENT:  Normocephalic, atraumatic  Cardiovascular: Normal S1S2, RRR, No murmurs/rubs/gallops appreciated  Pulmonary:  Bilateral air entry, No rhonchi/rales/wheezing appreciated  Abdominal: Soft, Bowel sounds present, Non-tender, Non-distended  Extremities:  No cyanosis, clubbing or edema     Neurological: Cranial nerves II-XII grossly intact, sensation intact, otherwise no focal neurological symptoms  Skin:  Warm, dry    Additional Data:     Labs:    Results from last 7 days   Lab Units 03/27/21  0631   WBC Thousand/uL 8 93   HEMOGLOBIN g/dL 10 8*   HEMATOCRIT % 33 3*   PLATELETS Thousands/uL 332   NEUTROS PCT % 61   LYMPHS PCT % 28   MONOS PCT % 9   EOS PCT % 1     Results from last 7 days   Lab Units 03/27/21  0457 03/26/21  0506   POTASSIUM mmol/L 3 4* 2 8*   CHLORIDE mmol/L 103 107   CO2 mmol/L 24 24   BUN mg/dL 1* 1*   CREATININE mg/dL 0 57* 0 46*   CALCIUM mg/dL 8 8 8 5   ALK PHOS U/L  --  121*   ALT U/L  --  470*   AST U/L  --  31            I Have Reviewed All Lab Data Listed Above          Recent Cultures (last 7 days):     Results from last 7 days   Lab Units 03/26/21  1634 03/23/21  2139   URINE CULTURE   --  >100,000 cfu/ml Gamma Hemolytic Streptococcus NOT Enterococcus  50,000-59,000 cfu/ml    C DIFF TOXIN B BY PCR  Negative  --        Last 24 Hours Medication List:   Current Facility-Administered Medications   Medication Dose Route Frequency Provider Last Rate    ARIPiprazole  5 mg Oral Daily Samia Edmonds DO      calcium carbonate  1,000 mg Oral Daily PRN Len Tilley PA-C      cloNIDine  0 1 mg Oral HS Argelia Harrison PA-C      DULoxetine  30 mg Oral Daily Lyssa Talley PA-C      enoxaparin  40 mg Subcutaneous Q24H [facility] Argelia Harrison PA-C      folic acid  1 mg Oral Daily Panda Owen PA-C      gabapentin  800 mg Oral TID Panda Owen PA-C      hydrOXYzine HCL  50 mg Oral BID PRN Samia Edmonds DO      ibuprofen  400 mg Oral Q6H PRN Teo Harrison PA-C      insulin lispro  1-6 Units Subcutaneous TID AC Argelia Harrison PA-C      insulin lispro  1-6 Units Subcutaneous HS Argelia Harrison PA-C      levothyroxine  25 mcg Oral Early Morning Argelia Harrison PA-C      metoprolol tartrate  50 mg Oral Q12H [facility] Argelia Harrison PA-C      metroNIDAZOLE  500 mg Oral Q12H [facility] Len Tilley PA-C      nicotine  1 patch Transdermal Daily Argelia Harrison PA-C      ondansetron  4 mg Intravenous Q6H PRN Salo Tang PA-C      sodium chloride 0 9 % with KCl 20 mEq/L  75 mL/hr Intravenous Continuous Janice Shepherd MD 75 mL/hr (03/27/21 0541)    traZODone  200 mg Oral HS Mary Lam PA-C          Today, Patient Was Seen By: Janice Shepherd MD

## 2021-03-27 NOTE — CASE MANAGEMENT
Called [facility] Crisis line to check on inpatient psych bed availability  Spoke to JADEN  She reports there are currently no beds available within the  network and she does not anticipate a bed becoming available until after Monday  She advised CM contact several outside facilities: [facility], [address] [place] [address], and [address] and spoke to [place]  They do have a bed available  Trinh requested all clinicals be faxed to: [phone] for review  Faxed  Trinh stated that she will call once review is complete  CM Department will continue to follow  Update: Response from [place] at [facility]  They are denying the referral stating patient is too medically complex for their facility  [facility] and spoke to JOSE GARCIA with patient's information  He does not have a bed that will accommodate patient's needs  [facility]  Spoke to [place] who confirmed that they have available beds in the facility  [place] requested clinicals be faxed to: [phone] for review  Faxed  Will await Lucia's determination  CM Department will continue to follow  Update: [place] from [facility] has declined the referral  Their psychiatrist reviewed the clinicals and feel the patient is too medically complex  [facility] and spoke to [place]  They do not have any beds available  Will await [facility] Crisis team to search for beds again within the [facility] network on Monday  CM Department will continue to follow

## 2021-03-27 NOTE — ASSESSMENT & PLAN NOTE
Lab Results   Component Value Date    HGBA1C 6 8 (H) 09/02/2020       Recent Labs     03/26/21 2050 03/27/21  0711 03/27/21  1125 03/27/21  1646   POCGLU 251* 225* 215* 234*       Blood Sugar Average: Last 72 hrs:  · (P) 967 [id number] diet controlled outpatient  · Sliding-scale insulin coverage with Accu-Cheks while inpatient

## 2021-03-27 NOTE — ASSESSMENT & PLAN NOTE
· UA with numerable WBCs and Trichomonas  · Continue Flagyl D4/5  · Follow-up urine culture  · Patient states she informed her sexual partner who will also be treated and will follow outpatient

## 2021-03-28 PROBLEM — A64 STI (SEXUALLY TRANSMITTED INFECTION): Status: ACTIVE | Noted: 2021-03-23

## 2021-03-28 LAB
ANION GAP SERPL CALCULATED.3IONS-SCNC: 9 MMOL/L (ref 4–13)
BASOPHILS # BLD AUTO: 0.03 THOUSANDS/ΜL (ref 0–0.1)
BASOPHILS NFR BLD AUTO: 0 % (ref 0–1)
BUN SERPL-MCNC: 2 MG/DL (ref 5–25)
CALCIUM SERPL-MCNC: 9 MG/DL (ref 8.3–10.1)
CHLORIDE SERPL-SCNC: 102 MMOL/L (ref 100–108)
CO2 SERPL-SCNC: 29 MMOL/L (ref 21–32)
CREAT SERPL-MCNC: 0.55 MG/DL (ref 0.6–1.3)
EOSINOPHIL # BLD AUTO: 0.1 THOUSAND/ΜL (ref 0–0.61)
EOSINOPHIL NFR BLD AUTO: 1 % (ref 0–6)
ERYTHROCYTE [DISTWIDTH] IN BLOOD BY AUTOMATED COUNT: 16.5 % (ref 11.6–15.1)
EST. AVERAGE GLUCOSE BLD GHB EST-MCNC: 237 MG/DL
GFR SERPL CREATININE-BSD FRML MDRD: 107 ML/MIN/1.73SQ M
GLUCOSE SERPL-MCNC: 196 MG/DL (ref 65–140)
GLUCOSE SERPL-MCNC: 217 MG/DL (ref 65–140)
GLUCOSE SERPL-MCNC: 238 MG/DL (ref 65–140)
GLUCOSE SERPL-MCNC: 252 MG/DL (ref 65–140)
GLUCOSE SERPL-MCNC: 281 MG/DL (ref 65–140)
HBA1C MFR BLD: 9.9 %
HCT VFR BLD AUTO: 35.3 % (ref 34.8–46.1)
HGB BLD-MCNC: 11.2 G/DL (ref 11.5–15.4)
IMM GRANULOCYTES # BLD AUTO: 0.08 THOUSAND/UL (ref 0–0.2)
IMM GRANULOCYTES NFR BLD AUTO: 1 % (ref 0–2)
LYMPHOCYTES # BLD AUTO: 2.17 THOUSANDS/ΜL (ref 0.6–4.47)
LYMPHOCYTES NFR BLD AUTO: 24 % (ref 14–44)
MAGNESIUM SERPL-MCNC: 1.8 MG/DL (ref 1.6–2.6)
MCH RBC QN AUTO: 31.3 PG (ref 26.8–34.3)
MCHC RBC AUTO-ENTMCNC: 31.7 G/DL (ref 31.4–37.4)
MCV RBC AUTO: 99 FL (ref 82–98)
MONOCYTES # BLD AUTO: 0.82 THOUSAND/ΜL (ref 0.17–1.22)
MONOCYTES NFR BLD AUTO: 9 % (ref 4–12)
NEUTROPHILS # BLD AUTO: 6.02 THOUSANDS/ΜL (ref 1.85–7.62)
NEUTS SEG NFR BLD AUTO: 65 % (ref 43–75)
NRBC BLD AUTO-RTO: 0 /100 WBCS
PLATELET # BLD AUTO: 386 THOUSANDS/UL (ref 149–390)
PMV BLD AUTO: 9.4 FL (ref 8.9–12.7)
POTASSIUM SERPL-SCNC: 3.6 MMOL/L (ref 3.5–5.3)
RBC # BLD AUTO: 3.58 MILLION/UL (ref 3.81–5.12)
SODIUM SERPL-SCNC: 140 MMOL/L (ref 136–145)
WBC # BLD AUTO: 9.22 THOUSAND/UL (ref 4.31–10.16)

## 2021-03-28 PROCEDURE — 82948 REAGENT STRIP/BLOOD GLUCOSE: CPT

## 2021-03-28 PROCEDURE — 83735 ASSAY OF MAGNESIUM: CPT | Performed by: INTERNAL MEDICINE

## 2021-03-28 PROCEDURE — 99232 SBSQ HOSP IP/OBS MODERATE 35: CPT | Performed by: INTERNAL MEDICINE

## 2021-03-28 PROCEDURE — 85025 COMPLETE CBC W/AUTO DIFF WBC: CPT | Performed by: INTERNAL MEDICINE

## 2021-03-28 PROCEDURE — 87591 N.GONORRHOEAE DNA AMP PROB: CPT | Performed by: OBSTETRICS & GYNECOLOGY

## 2021-03-28 PROCEDURE — 83036 HEMOGLOBIN GLYCOSYLATED A1C: CPT | Performed by: INTERNAL MEDICINE

## 2021-03-28 PROCEDURE — 86592 SYPHILIS TEST NON-TREP QUAL: CPT | Performed by: OBSTETRICS & GYNECOLOGY

## 2021-03-28 PROCEDURE — 87491 CHLMYD TRACH DNA AMP PROBE: CPT | Performed by: OBSTETRICS & GYNECOLOGY

## 2021-03-28 PROCEDURE — 99232 SBSQ HOSP IP/OBS MODERATE 35: CPT | Performed by: OBSTETRICS & GYNECOLOGY

## 2021-03-28 PROCEDURE — 80048 BASIC METABOLIC PNL TOTAL CA: CPT | Performed by: INTERNAL MEDICINE

## 2021-03-28 RX ORDER — NYSTATIN 100000 U/G
OINTMENT TOPICAL 2 TIMES DAILY
Status: DISCONTINUED | OUTPATIENT
Start: 2021-03-28 | End: 2021-03-31 | Stop reason: HOSPADM

## 2021-03-28 RX ORDER — INSULIN GLARGINE 100 [IU]/ML
10 INJECTION, SOLUTION SUBCUTANEOUS
Status: DISCONTINUED | OUTPATIENT
Start: 2021-03-28 | End: 2021-03-31 | Stop reason: HOSPADM

## 2021-03-28 RX ORDER — FLUCONAZOLE 100 MG/1
200 TABLET ORAL ONCE
Status: COMPLETED | OUTPATIENT
Start: 2021-03-28 | End: 2021-03-28

## 2021-03-28 RX ORDER — VALACYCLOVIR HYDROCHLORIDE 500 MG/1
1000 TABLET, FILM COATED ORAL EVERY 12 HOURS SCHEDULED
Status: DISCONTINUED | OUTPATIENT
Start: 2021-03-28 | End: 2021-03-31 | Stop reason: HOSPADM

## 2021-03-28 RX ADMIN — LEVOTHYROXINE SODIUM 25 MCG: 25 TABLET ORAL at 05:38

## 2021-03-28 RX ADMIN — HYDROXYZINE HYDROCHLORIDE 50 MG: 25 TABLET, FILM COATED ORAL at 16:12

## 2021-03-28 RX ADMIN — GABAPENTIN 800 MG: 400 CAPSULE ORAL at 16:40

## 2021-03-28 RX ADMIN — INSULIN GLARGINE 10 UNITS: 100 INJECTION, SOLUTION SUBCUTANEOUS at 21:46

## 2021-03-28 RX ADMIN — METRONIDAZOLE 500 MG: 500 TABLET ORAL at 21:43

## 2021-03-28 RX ADMIN — NYSTATIN: 100000 OINTMENT TOPICAL at 19:52

## 2021-03-28 RX ADMIN — SODIUM CHLORIDE AND POTASSIUM CHLORIDE 75 ML/HR: .9; .15 SOLUTION INTRAVENOUS at 00:58

## 2021-03-28 RX ADMIN — CLONIDINE HYDROCHLORIDE 0.1 MG: 0.1 TABLET ORAL at 21:43

## 2021-03-28 RX ADMIN — ARIPIPRAZOLE 5 MG: 5 TABLET ORAL at 08:07

## 2021-03-28 RX ADMIN — GABAPENTIN 800 MG: 400 CAPSULE ORAL at 21:43

## 2021-03-28 RX ADMIN — METRONIDAZOLE 500 MG: 500 TABLET ORAL at 08:07

## 2021-03-28 RX ADMIN — IBUPROFEN 400 MG: 400 TABLET ORAL at 13:03

## 2021-03-28 RX ADMIN — METOPROLOL TARTRATE 50 MG: 50 TABLET, FILM COATED ORAL at 21:43

## 2021-03-28 RX ADMIN — GABAPENTIN 800 MG: 400 CAPSULE ORAL at 08:07

## 2021-03-28 RX ADMIN — Medication 1 PATCH: at 08:13

## 2021-03-28 RX ADMIN — DULOXETINE HYDROCHLORIDE 30 MG: 30 CAPSULE, DELAYED RELEASE ORAL at 08:07

## 2021-03-28 RX ADMIN — INSULIN LISPRO 3 UNITS: 100 INJECTION, SOLUTION INTRAVENOUS; SUBCUTANEOUS at 11:42

## 2021-03-28 RX ADMIN — TRAZODONE HYDROCHLORIDE 200 MG: 100 TABLET ORAL at 21:43

## 2021-03-28 RX ADMIN — ENOXAPARIN SODIUM 40 MG: 40 INJECTION SUBCUTANEOUS at 08:07

## 2021-03-28 RX ADMIN — VALACYCLOVIR HYDROCHLORIDE 1000 MG: 500 TABLET, FILM COATED ORAL at 19:52

## 2021-03-28 RX ADMIN — INSULIN LISPRO 4 UNITS: 100 INJECTION, SOLUTION INTRAVENOUS; SUBCUTANEOUS at 16:40

## 2021-03-28 RX ADMIN — FOLIC ACID 1 MG: 1 TABLET ORAL at 08:07

## 2021-03-28 RX ADMIN — INSULIN LISPRO 3 UNITS: 100 INJECTION, SOLUTION INTRAVENOUS; SUBCUTANEOUS at 08:08

## 2021-03-28 RX ADMIN — FLUCONAZOLE 200 MG: 100 TABLET ORAL at 17:13

## 2021-03-28 RX ADMIN — INSULIN LISPRO 2 UNITS: 100 INJECTION, SOLUTION INTRAVENOUS; SUBCUTANEOUS at 21:46

## 2021-03-28 RX ADMIN — METOPROLOL TARTRATE 50 MG: 50 TABLET, FILM COATED ORAL at 08:07

## 2021-03-28 NOTE — ASSESSMENT & PLAN NOTE
· Patient also presented to the hospital with complaint of anxiety with suicidal ideation  ·  psychiatry consultation appreciated  · Psychiatry recommending to discontinue one-to-one  · Continue Cymbalta 30 mg daily, gabapentin 100 mg t i d , trazodone 100 mg q h s   · started on Abilify 5 mg HS  · Atarax increased to 50 mg q 12 hours  · Patient signed a 201 and is appropriate for inpatient psych admission

## 2021-03-28 NOTE — NURSING NOTE
Agree with previous RN assessment  Pt resting in bed, no complaints at this time  Call bell next to pt in bed

## 2021-03-28 NOTE — CONSULTS
Susan 50 y o  female MRN: [id number]  Unit/Bed#: E5 -01 Encounter: [id number]      ASSESSMENT/PLAN:  52yo P2 currently admitted for metabolic acidosis and suicidal ideation, c/o vulvar irritation  Physical exam consistent with both vulvovaginal candidiasis and likely primary herpes infection outbreak  Labs significant for: UA showing trichomonas, +HSV1, negative Hep C and HIV     - Valtrex 1000mg BID x 10 days  - Fluconazole PO x 1  - Nystatin ointment BID to affected area  - Continue Flagyl tx  - F/U GC/Chlamydia  - F/U RPR      SUBJECTIVE:    HPI: Benito Fermin is a 50 y o  female who presented to ED on 3/23 w/ suicidal ideation and was found to have a metabolic acidosis on admission  She is a current daily smoker (1 pack/day)  Denies recent alcohol consumption  She has extensive pysch history with recent admissions  Regarding her vulvar complaints, patient reports that she started feeling sore and sensitive "down there" a few weeks ago  She presented to the ED on 3/17 with similar complaints  At that time, exam showed erythema and discharge consistent w/ vulvar candidiasis  She reports that she noticed some blisters that burst and that the area has become very tender to touch, but that it has improved  She denies any similar symptoms in the past     Patient reports that she had a hysterectomy during her delivery with her 2nd child 30 years ago  She reports that before that she had some yeast infections but denies any recent infections that she is aware of  She denies any history of gonorrhea, chlamydia, herpes or other STDs  Review of Systems   Constitutional: Negative for chills, fatigue and fever  Respiratory: Negative for shortness of breath  Cardiovascular: Negative for chest pain  Gastrointestinal: Negative for abdominal pain, nausea and vomiting  Genitourinary: Positive for vaginal pain  Negative for pelvic pain and vaginal discharge  Historical Information   Past Medical History:   Diagnosis Date    Addiction to drug ([place] [address] )     Adjustment disorder     Alcohol abuse     Alcoholism ([place] [address] )     Anxiety     Bipolar disorder (HCC)     Bowel obstruction ([place] [address] )     Depression     Diabetes type 2, controlled ([place] [address] )     Disease of thyroid gland     Gastritis     Head injury     Heart palpitations     Hyperlipidemia     Hypertension     Hypothyroidism     Psychiatric illness     PTSD (post-traumatic stress disorder)     Seizure ([place] [address] )     Seizures ([place] [address] )     Sleep difficulties     Substance abuse ([place] [address] )     Suicide attempt ([place] [address] )     Withdrawal symptoms, drug or narcotic ([place] [address] )      Past Surgical History:   Procedure Laterality Date    ABDOMINAL SURGERY      APPENDECTOMY      BOWEL RESECTION      CHOLECYSTECTOMY      ESOPHAGOGASTRODUODENOSCOPY N/A 5/18/2018    Procedure: ESOPHAGOGASTRODUODENOSCOPY (EGD) with bx;  Surgeon: Enid Umana DO;  Location: [facility]; Service: Gastroenterology    HYSTERECTOMY      KNEE SURGERY Bilateral      OB History   No obstetric history on file       Family History   Problem Relation Age of Onset    Diabetes Father     Bipolar disorder Mother      Social History   Social History     Substance and Sexual Activity   Alcohol Use Not Currently    Alcohol/week: 6 0 standard drinks    Types: 6 Cans of beer per week    Frequency: 4 or more times a week    Drinks per session: 5 or 6    Binge frequency: Weekly    Comment: last drink on 08/15/20     Social History     Substance and Sexual Activity   Drug Use No     Social History     Tobacco Use   Smoking Status Current Every Day Smoker    Packs/day: 0 50    Years: 25 00    Pack years: 12 50    Types: Cigarettes   Smokeless Tobacco Never Used       Meds/Allergies   Current Facility-Administered Medications   Medication Dose Route Frequency    ARIPiprazole (ABILIFY) tablet 5 mg  5 mg Oral Daily    calcium carbonate (TUMS) chewable tablet 1,000 mg  1,000 mg Oral Daily PRN    cloNIDine (CATAPRES) tablet 0 1 mg  0 1 mg Oral HS    DULoxetine (CYMBALTA) delayed release capsule 30 mg  30 mg Oral Daily    enoxaparin (LOVENOX) subcutaneous injection 40 mg  40 mg Subcutaneous Q62E [facility]    folic acid (FOLVITE) tablet 1 mg  1 mg Oral Daily    gabapentin (NEURONTIN) capsule 800 mg  800 mg Oral TID    hydrOXYzine HCL (ATARAX) tablet 50 mg  50 mg Oral BID PRN    ibuprofen (MOTRIN) tablet 400 mg  400 mg Oral Q6H PRN    insulin lispro (HumaLOG) 100 units/mL subcutaneous injection 1-6 Units  1-6 Units Subcutaneous TID AC    insulin lispro (HumaLOG) 100 units/mL subcutaneous injection 1-6 Units  1-6 Units Subcutaneous HS    levothyroxine tablet 25 mcg  25 mcg Oral Early Morning    metoprolol tartrate (LOPRESSOR) tablet 50 mg  50 mg Oral Q12H NACH[place]    metroNIDAZOLE (FLAGYL) tablet 500 mg  500 mg Oral Q12H [address]    nicotine (NICODERM CQ) 14 mg/24hr TD 24 hr patch 1 patch  1 patch Transdermal Daily    ondansetron (ZOFRAN) injection 4 mg  4 mg Intravenous Q6H PRN    traZODone (DESYREL) tablet 200 mg  200 mg Oral HS       Allergies   Allergen Reactions    Latex Rash         OBJECTIVE:    Vitals:   Vitals:    03/27/21 2300 03/28/21 0735 03/28/21 1258 03/28/21 1502   BP: 135/72 (!) 179/85 157/94 144/77   BP Location: Right arm Left arm Left arm Left arm   Pulse: 88 72  79   Resp: 18 18  18   Temp: 97 9 °F (36 6 °C) 98 °F (36 7 °C)  (!) 97 2 °F (36 2 °C)   TempSrc: Temporal Temporal  Temporal   SpO2: 98% 93%  95%   Weight:       Height:           Physical Exam  Constitutional:       General: She is not in acute distress  Appearance: She is not ill-appearing  Cardiovascular:      Rate and Rhythm: Normal rate  Pulmonary:      Effort: Pulmonary effort is normal  No respiratory distress     Genitourinary:     Comments:   Significant vulvar erythema noted; bilateral labial folds w/ significant excoriations and thick white discharge; excoriations noted throughout labial fold and down to perineum; very tender to touch  Speculum exam: able to tolerate; vagina appears normal, scant discharge; no cervix visualized (surgically absent)  Skin:     General: Skin is warm and dry  Neurological:      Mental Status: She is alert and oriented to person, place, and time     Psychiatric:         Mood and Affect: Mood normal          Behavior: Behavior normal          Lab Results:   Recent Results (from the past 24 hour(s))   Fingerstick Glucose (POCT)    Collection Time: 03/27/21  4:46 PM   Result Value Ref Range    POC Glucose 234 (H) 65 - 140 mg/dl   Fingerstick Glucose (POCT)    Collection Time: 03/27/21  8:54 PM   Result Value Ref Range    POC Glucose 347 (H) 65 - 140 mg/dl   CBC and differential    Collection Time: 03/28/21  5:21 AM   Result Value Ref Range    WBC 9 22 4 31 - 10 16 Thousand/uL    RBC 3 58 (L) 3 81 - 5 12 Million/uL    Hemoglobin 11 2 (L) 11 5 - 15 4 g/dL    Hematocrit 35 3 34 8 - 46 1 %    MCV 99 (H) 82 - 98 fL    MCH 31 3 26 8 - 34 3 pg    MCHC 31 7 31 4 - 37 4 g/dL    RDW 16 5 (H) 11 6 - 15 1 %    MPV 9 4 8 9 - 12 7 fL    Platelets 435 605 - 439 Thousands/uL    nRBC 0 /100 WBCs    Neutrophils Relative 65 43 - 75 %    Immat GRANS % 1 0 - 2 %    Lymphocytes Relative 24 14 - 44 %    Monocytes Relative 9 4 - 12 %    Eosinophils Relative 1 0 - 6 %    Basophils Relative 0 0 - 1 %    Neutrophils Absolute 6 02 1 85 - 7 62 Thousands/µL    Immature Grans Absolute 0 08 0 00 - 0 20 Thousand/uL    Lymphocytes Absolute 2 17 0 60 - 4 47 Thousands/µL    Monocytes Absolute 0 82 0 17 - 1 22 Thousand/µL    Eosinophils Absolute 0 10 0 00 - 0 61 Thousand/µL    Basophils Absolute 0 03 0 00 - 0 10 Thousands/µL   Basic metabolic panel    Collection Time: 03/28/21  5:21 AM   Result Value Ref Range    Sodium 140 136 - 145 mmol/L    Potassium 3 6 3 5 - 5 3 mmol/L    Chloride 102 100 - 108 mmol/L    CO2 29 21 - 32 mmol/L    ANION GAP 9 4 - 13 mmol/L    BUN 2 (L) 5 - 25 mg/dL    Creatinine 0 55 (L) 0 60 - 1 30 mg/dL    Glucose 217 (H) 65 - 140 mg/dL    Calcium 9 0 8 3 - 10 1 mg/dL    eGFR 107 ml/min/1 73sq m   Magnesium    Collection Time: 03/28/21  5:21 AM   Result Value Ref Range    Magnesium 1 8 1 6 - 2 6 mg/dL   Fingerstick Glucose (POCT)    Collection Time: 03/28/21  7:04 AM   Result Value Ref Range    POC Glucose 238 (H) 65 - 140 mg/dl   Fingerstick Glucose (POCT)    Collection Time: 03/28/21 11:39 AM   Result Value Ref Range    POC Glucose 252 (H) 65 - 140 mg/dl       Imaging Studies: I have personally reviewed pertinent reports  EKG, Pathology, and Other Studies: I have personally reviewed pertinent reports          Daphnie Marshall MD  OB/GYN PGY-3  3/28/2021  3:22 PM

## 2021-03-28 NOTE — ASSESSMENT & PLAN NOTE
· UA with numerable WBCs and Trichomonas  · Continue Flagyl D5/5  · Vaginal exam performed with MARK ANTHONY Eaton Getting at bedside--patient with bilateral groin rash, areas of scars, labial swelling and irritation and white discharge  · GYN consultation will be appreciated  · Patient states she informed her sexual partner who will also be treated and will follow outpatient

## 2021-03-28 NOTE — DISCHARGE INSTRUCTIONS
Trichomoniasis   WHAT YOU NEED TO KNOW:   Trichomoniasis is a common sexually transmitted infection (STI)  It is spread between people during sex or genital contact  Trichomoniasis is caused by tiny parasites that are too small to be seen  DISCHARGE INSTRUCTIONS:   Medicines:   · Antibiotics:  Always take your antibiotics exactly as ordered by your healthcare provider  Keep taking this medicine as ordered until it is completely gone, even if you feel better  If you stop taking antibiotics before they are gone, they may not completely cure your infection  Never save antibiotics or take leftover antibiotics that were given to you for another illness  Do not drink alcohol while you use antibiotic medicine to treat trichomoniasis  It may make you sick  Wait at least 3 days after your last dose of medicine before you drink alcohol again  Also avoid over-the-counter medicines that contain alcohol, such as certain cough or cold medicines  · Over-the-counter pain medicine: You may use over-the-counter (OTC) pain medicines, such as ibuprofen or acetaminophen, for pain or swelling  These medicines may be bought without a healthcare provider's order  These medicines are safe for most people to use  However, they can cause serious problems when they are not used correctly  People with certain medical conditions, or using certain other medicines are at a higher risk for problems  Using too much, or using these medicines for longer than the label says can also cause problems  Follow directions on the label carefully  If you have questions, talk to your healthcare provider  · Take your medicine as directed  Contact your healthcare provider if you think your medicine is not helping or if you have side effects  Tell him or her if you are allergic to any medicine  Keep a list of the medicines, vitamins, and herbs you take  Include the amounts, and when and why you take them   Bring the list or the pill bottles to follow-up visits  Carry your medicine list with you in case of an emergency  Self care:   · Sex:  Tell your sexual partners that you have this infection  They may also be infected and need treatment  Do not have sex until both you and your partner are done with treatment and all symptoms are gone  · Bathing and handwashing:  Wash your hands thoroughly with soap and warm water after going to the bathroom  This helps keep your infection from spreading to other parts of your body, such as your eyes  Keep your genital area clean and dry  Take showers instead of baths and use plain, unscented soap  · Advice for women:  Do not douche during treatment unless your healthcare provider tells you to  Do not use feminine hygiene sprays or powders  Prevent trichomoniasis:  You can get trichomoniasis more than once  Limit the number of sexual partners you have to decrease your risk for another infection  Do not have unprotected sex (including oral sex)  Always wear a latex condom during sex to prevent trichomoniasis and other STIs  Use a new condom after each ejaculation  For more information:   · Division of STD Prevention, Centers for Disease Control and Prevention  [address] [facility] , [address] [place] [address]  Phone: 0- 624 - [phone]  Web Address: [employer] au  · [address] [address] ([place])  P O  [address] [facility] , [address] [place] [address]  Web Address: http://www CLARED/  org    Contact your healthcare provider if:   · Your symptoms become worse, or come back after treatment  · You have unusual vaginal bleeding  · You have any problems that may be caused by the medicine you are taking  · You have questions or concerns about your condition or care  © Copyright [address] [place] [address] Drive Information is for End User's use only and may not be sold, redistributed or otherwise used for commercial purposes   All illustrations and images included in CareNotes® are the copyrighted property of A  D A M , Inc  or [facility]  The above information is an  only  It is not intended as medical advice for individual conditions or treatments  Talk to your doctor, nurse or pharmacist before following any medical regimen to see if it is safe and effective for you  Trichomoniasis   WHAT YOU NEED TO KNOW:   What is trichomoniasis? Trichomoniasis is a common sexually transmitted infection (STI)  It is spread between people during sex or genital contact  Trichomoniasis is caused by tiny parasites that are too small to be seen  What are the signs and symptoms of trichomoniasis? You may not have any symptoms  If you have symptoms, they usually appear 5 to 28 days after you were exposed  If young children and teenagers get trichomoniasis, it may be a sign of sexual abuse  Tell a healthcare provider immediately if you suspect sexual abuse  · In women: You may notice a discharge from your vagina that is bad smelling, bubbly, or yellow or green  Your vagina may itch or hurt  You may have pain when you urinate and feel like you need to urinate often  You may also have lower stomach pain or pain during sex  · In men: You may feel irritation, burning, or pain in your penis during or after urinating or ejaculating  You may have an unusual discharge from your penis  Many men do not have signs or symptoms  Even if you have no symptoms, you are still able to spread the infection to your partner  How is trichomoniasis diagnosed? Your healthcare provider will ask questions about your sexual history  Tell him if you have a history of STIs  This will help determine the cause of your symptoms  You may need the following:  · Pelvic exam:  This is also called an internal or vaginal exam  Your healthcare provider will gently put a warmed speculum into your vagina  A speculum is a tool that opens your vagina  Your healthcare provider will look to see if you have red patches on your cervix or vagina  This may mean you have trichomoniasis  · Discharge sample and culture: If you have discharge from your penis or vagina, your healthcare provider may take a sample of it to test for the parasite that causes trichomoniasis  How is trichomoniasis treated? You will need to take antibiotic medicine to kill the parasite, even if you have no symptoms  Tell your sex partners that you have trichomoniasis  Anyone you have had sex with recently must also be treated  If your partner is not treated, they may give the infection back to you or infect someone else  Take your medicine as ordered until it is gone, even if you feel better  Do not have sex until both you and your partner have taken all of your medicine, and your symptoms are gone  Ask your healthcare provider when it is safe to have sex again  How can I prevent another trichomoniasis infection? You can get trichomoniasis more than once  Limit the number of sexual partners you have to decrease your risk for another infection  Do not have unprotected sex (including oral sex)  Always wear a latex condom during sex to prevent trichomoniasis and other STIs  Use a new condom after each ejaculation  What are the risks of trichomoniasis? Trichomoniasis increases the risk that other STIs, including HIV, will be spread from person to person  Tell your healthcare provider if you know or think you are pregnant  A trichomoniasis infection can be dangerous for a pregnant woman  It may cause your water to break too soon, or your baby to be born too early or too small  Where can I find support and more information? · Division of STD Prevention, Centers for Disease Control and Prevention  [facility] , [address] [facility]  Phone: 3- 192 - [phone]  Web Address: [employer] com au    · [address] [address] ([place])  P O  [address]  Web Address: http://www [employer]Beauty/  org  CARE AGREEMENT:   You have the right to help plan your care  Learn about your health condition and how it may be treated  Discuss treatment options with your healthcare providers to decide what care you want to receive  You always have the right to refuse treatment  The above information is an  only  It is not intended as medical advice for individual conditions or treatments  Talk to your doctor, nurse or pharmacist before following any medical regimen to see if it is safe and effective for you  © Copyright [address] Information is for End User's use only and may not be sold, redistributed or otherwise used for commercial purposes  All illustrations and images included in CareNotes® are the copyrighted property of [employer]  or [address] [place] [address] on Adolescence and Society for Adolescent Health and Medicine: Screening for Nonviral Sexually Transmitted Infections in Adolescents and [address] [facility]  Pediatrics 2014; Epub:Epub  ALEXIS Galloway 286 for Disease Control and Prevention: Sexually transmitted diseases treatment guidelines, 2010  MMWR Recomm Rep 2010; 59(RR-12):1-110  MMWR Recomm Rep : Sexual assault and STD's  Sexually transmitted diseases treatment guidelines 2002  [facility] Available at: TaxHiking com cy  aspx?doc_id=3246&war=1815&string=trichomonas , 2002 May 10;51(RR-6):69-74  Accessed March 11, 2004  CDC, Division of Parasitic Diseases (DPD)  : Trichomonas Infection  Fact Sheet    Available HistoryWireless com ee , Page last reviewed January 13, 2004  Accessed March 11, 2004  American Society for Colposcopy and Cervical Pathology (ASCCP) : Trichomoniasis    Available at: 41984 com ee , 2003  Accessed December 6, 2004  National Guideline Clearinghouse[de-identified] 2002 National guideline on the management of trichomonas vaginalis     Available at: www guideline gov/summary/summary  aspx?doc_id=3034&ozl=7429&string=pregnancy , (cited 03)  Anon: Centers for Disease Control: Sexually Transmitted Diseases Treatment Guidelines  Available at: [employer] SpAuine com br  htm#Trichomoniasis (cited 2004),   Cricket Muse PA: : Association of Women's Health, Obstetric, and Neontal Nurses:  Nursing  [place], [place];, 2001  Omayra WESTON[de-identified] Maternal Child Nursing Care, 2nd ed  [place], [place]; , 2002  Centers for Disease Control and Prevention : CDC fact sheet: trichomoniasis      Available at: [employer] at , (cited 10/18/04)  © Copyright [address] [place] [address] Information is for End User's use only and may not be sold, redistributed or otherwise used for commercial purposes  All illustrations and images included in CareNotes® are the copyrighted property of A D [employer]  or [facility][note 3] Finn Alston   The above information is an  only  It is not intended as medical advice for individual conditions or treatments  Talk to your doctor, nurse or pharmacist before following any medical regimen to see if it is safe and effective for you

## 2021-03-28 NOTE — PROGRESS NOTES
[facility]  Progress Note Bayron Cruz 1965, 50 y o  female MRN: [id number]  Unit/Bed#: E5 -01 Encounter: [id number]  Primary Care Provider: YONG Mckee   Date and time admitted to hospital: 3/23/2021  9:12 AM    * Increased anion gap metabolic acidosis  Assessment & Plan  · Patient was found to have anion gap metabolic acidosis with CO2 16, anion gap 21- repeat CO2 17 and gap 17  · Likely in the setting of starvation ketosis as patient notes she has not eaten anything for 5 days  · Anion gap improved  · Appetite improved    STI (sexually transmitted infection)  Assessment & Plan  · UA with numerable WBCs and Trichomonas  · Continue Flagyl D5/5  · Vaginal exam performed with RN Dennie Ka at bedside--patient with bilateral groin rash, areas of scars, labial swelling and irritation and white discharge  · GYN consultation will be appreciated  · Patient states she informed her sexual partner who will also be treated and will follow outpatient    Tobacco abuse  Assessment & Plan  · Nicotine patch while inpatient  · Tobacco cessation education    Acquired hypothyroidism  Assessment & Plan  · Continue levothyroxine    Type 2 diabetes mellitus without complication, without long-term current use of insulin [facility])  Assessment & Plan  Lab Results   Component Value Date    HGBA1C 6 8 (H) 09/02/2020       Recent Labs     03/27/21  1646 03/27/21  2054 03/28/21  0704 03/28/21  1139   POCGLU 234* 347* 238* 252*       · Accu-Cheks elevated possibly in setting of infection  · Will recheck hemoglobin A1c  · Start Lantus 10 units at bedtime  · Monitor Accu-Cheks, sliding scale coverage  · Diabetic diet    Bipolar disorder current episode depressed ([facility] [address] )  Assessment & Plan  · Patient also presented to the hospital with complaint of anxiety with suicidal ideation  ·  psychiatry consultation appreciated  · Psychiatry recommending to discontinue one-to-one  · Continue Cymbalta 30 mg daily, gabapentin 100 mg t i d , trazodone 100 mg q h s   · started on Abilify 5 mg HS  · Atarax increased to 50 mg q 12 hours  · Patient signed a 201 and is appropriate for inpatient psych admission    Essential hypertension  Assessment & Plan  · BP elevated at time of admission  · Continue Lopressor and Catapres        VTE Pharmacologic Prophylaxis:   Pharmacologic:  Lovenox    Patient Centered Rounds: I have performed bedside rounds with nursing staff today  Time Spent for Care: 20 minutes  More than 50% of total time spent on counseling and coordination of care as described above  Current Length of Stay: 5 day(s)    Current Patient Status: Inpatient   Certification Statement: The patient will continue to require additional inpatient hospital stay due to Awaiting inpatient psych bed    Discharge Plan / Estimated Discharge Date: TBD    Code Status: Level 1 - Full Code      Subjective:   Patient seen and examined at bedside, denies any dental pain, nausea vomiting  States appetite is better    Objective:     Vitals:   Temp (24hrs), Av 7 °F (36 5 °C), Min:97 2 °F (36 2 °C), Max:98 °F (36 7 °C)    Temp:  [97 2 °F (36 2 °C)-98 °F (36 7 °C)] 97 2 °F (36 2 °C)  HR:  [72-88] 79  Resp:  [18] 18  BP: (135-179)/(72-94) 144/77  SpO2:  [93 %-98 %] 95 %  Body mass index is 34 36 kg/m²  Input and Output Summary (last 24 hours): Intake/Output Summary (Last 24 hours) at 3/28/2021 1520  Last data filed at 3/28/2021 1305  Gross per 24 hour   Intake 600 ml   Output --   Net 600 ml       Physical Exam:    Constitutional: Patient is oriented to person, place and time, no acute distress  HEENT:  Normocephalic, atraumatic  Cardiovascular: Normal S1S2, RRR, No murmurs/rubs/gallops appreciated  Pulmonary:  Bilateral air entry, No rhonchi/rales/wheezing appreciated  Abdominal: Soft, Bowel sounds present, Non-tender, Non-distended  Extremities:  No cyanosis, clubbing or edema     Neurological: Cranial nerves II-XII grossly intact, sensation intact, otherwise no focal neurological symptoms  Skin:  Warm, dry  Genitourinary:  White vaginal discharge, bilateral groin skin rash with scarring, labial irritation and erythema    Additional Data:     Labs:    Results from last 7 days   Lab Units 03/28/21  0521   WBC Thousand/uL 9 22   HEMOGLOBIN g/dL 11 2*   HEMATOCRIT % 35 3   PLATELETS Thousands/uL 386   NEUTROS PCT % 65   LYMPHS PCT % 24   MONOS PCT % 9   EOS PCT % 1     Results from last 7 days   Lab Units 03/28/21  0521  03/26/21  0506   POTASSIUM mmol/L 3 6   < > 2 8*   CHLORIDE mmol/L 102   < > 107   CO2 mmol/L 29   < > 24   BUN mg/dL 2*   < > 1*   CREATININE mg/dL 0 55*   < > 0 46*   CALCIUM mg/dL 9 0   < > 8 5   ALK PHOS U/L  --   --  121*   ALT U/L  --   --  470*   AST U/L  --   --  31    < > = values in this interval not displayed  I Have Reviewed All Lab Data Listed Above          Recent Cultures (last 7 days):     Results from last 7 days   Lab Units 03/26/21  1634 03/23/21  2139   URINE CULTURE   --  >100,000 cfu/ml Gamma Hemolytic Streptococcus NOT Enterococcus  50,000-59,000 cfu/ml    C DIFF TOXIN B BY PCR  Negative  --        Last 24 Hours Medication List:   Current Facility-Administered Medications   Medication Dose Route Frequency Provider Last Rate    ARIPiprazole  5 mg Oral Daily Diannia Adas, DO      calcium carbonate  1,000 mg Oral Daily PRN Lucita Ontiveros PA-C      cloNIDine  0 1 mg Oral HS Argelia Hunter, PA-C      DULoxetine  30 mg Oral Daily [facility] SOO Adkins-C      enoxaparin  40 mg Subcutaneous Q24H [address] Argelia Smudde, PA-C      folic acid  1 mg Oral Daily SOO Davis-C      gabapentin  800 mg Oral TID SOO Davis-C      hydrOXYzine HCL  50 mg Oral BID PRN Diannia Adas, DO      ibuprofen  400 mg Oral Q6H PRN [place] Limber Smudde, PA-C      insulin lispro  1-6 Units Subcutaneous TID AC Argelia Smudde, PA-C      insulin lispro  1-6 Units Subcutaneous HS Argelia Smudde, PA-C      levothyroxine 25 mcg Oral Early Morning Argelia Harrison PA-C      metoprolol tartrate  50 mg Oral Q12H [facility] Argelia Harrison PA-C      metroNIDAZOLE  500 mg Oral Q12H [facility] ARACELIS Ramirez      nicotine  1 patch Transdermal Daily Argelia Harrison PA-C      ondansetron  4 mg Intravenous Q6H PRN ARACELIS Ramirez      traZODone  200 mg Oral HS Fish Browne PA-C          Today, Patient Was Seen By: Stella Rea MD

## 2021-03-28 NOTE — ASSESSMENT & PLAN NOTE
· Patient was found to have anion gap metabolic acidosis with CO2 16, anion gap 21- repeat CO2 17 and gap 17  · Likely in the setting of starvation ketosis as patient notes she has not eaten anything for 5 days  · Anion gap improved  · Appetite improved

## 2021-03-28 NOTE — ASSESSMENT & PLAN NOTE
Lab Results   Component Value Date    HGBA1C 6 8 (H) 09/02/2020       Recent Labs     03/27/21  1646 03/27/21  2054 03/28/21  0704 03/28/21  1139   POCGLU 234* 347* 238* 252*       Blood Sugar Average: Last 72 hrs:  · (P) 304 [id number] diet controlled outpatient  · Sliding-scale insulin coverage with Accu-Cheks while inpatient

## 2021-03-29 LAB
ANION GAP SERPL CALCULATED.3IONS-SCNC: 8 MMOL/L (ref 4–13)
BASOPHILS # BLD AUTO: 0.04 THOUSANDS/ΜL (ref 0–0.1)
BASOPHILS NFR BLD AUTO: 0 % (ref 0–1)
BUN SERPL-MCNC: 6 MG/DL (ref 5–25)
CALCIUM SERPL-MCNC: 8.9 MG/DL (ref 8.3–10.1)
CHLORIDE SERPL-SCNC: 102 MMOL/L (ref 100–108)
CO2 SERPL-SCNC: 30 MMOL/L (ref 21–32)
CREAT SERPL-MCNC: 0.55 MG/DL (ref 0.6–1.3)
EOSINOPHIL # BLD AUTO: 0.08 THOUSAND/ΜL (ref 0–0.61)
EOSINOPHIL NFR BLD AUTO: 1 % (ref 0–6)
ERYTHROCYTE [DISTWIDTH] IN BLOOD BY AUTOMATED COUNT: 16.9 % (ref 11.6–15.1)
GFR SERPL CREATININE-BSD FRML MDRD: 107 ML/MIN/1.73SQ M
GLUCOSE SERPL-MCNC: 199 MG/DL (ref 65–140)
GLUCOSE SERPL-MCNC: 239 MG/DL (ref 65–140)
GLUCOSE SERPL-MCNC: 245 MG/DL (ref 65–140)
GLUCOSE SERPL-MCNC: 276 MG/DL (ref 65–140)
GLUCOSE SERPL-MCNC: 281 MG/DL (ref 65–140)
HCT VFR BLD AUTO: 37.8 % (ref 34.8–46.1)
HGB BLD-MCNC: 11.7 G/DL (ref 11.5–15.4)
HIV 1+2 AB+HIV1 P24 AG SERPL QL IA: NORMAL
HIV1 P24 AG SER QL: NORMAL
IMM GRANULOCYTES # BLD AUTO: 0.06 THOUSAND/UL (ref 0–0.2)
IMM GRANULOCYTES NFR BLD AUTO: 1 % (ref 0–2)
LYMPHOCYTES # BLD AUTO: 1.99 THOUSANDS/ΜL (ref 0.6–4.47)
LYMPHOCYTES NFR BLD AUTO: 22 % (ref 14–44)
MCH RBC QN AUTO: 31.9 PG (ref 26.8–34.3)
MCHC RBC AUTO-ENTMCNC: 31 G/DL (ref 31.4–37.4)
MCV RBC AUTO: 103 FL (ref 82–98)
MONOCYTES # BLD AUTO: 0.81 THOUSAND/ΜL (ref 0.17–1.22)
MONOCYTES NFR BLD AUTO: 9 % (ref 4–12)
NEUTROPHILS # BLD AUTO: 6.26 THOUSANDS/ΜL (ref 1.85–7.62)
NEUTS SEG NFR BLD AUTO: 67 % (ref 43–75)
NRBC BLD AUTO-RTO: 0 /100 WBCS
PLATELET # BLD AUTO: 354 THOUSANDS/UL (ref 149–390)
PMV BLD AUTO: 9.7 FL (ref 8.9–12.7)
POTASSIUM SERPL-SCNC: 4.1 MMOL/L (ref 3.5–5.3)
RBC # BLD AUTO: 3.67 MILLION/UL (ref 3.81–5.12)
RPR SER QL: NORMAL
SODIUM SERPL-SCNC: 140 MMOL/L (ref 136–145)
WBC # BLD AUTO: 9.24 THOUSAND/UL (ref 4.31–10.16)

## 2021-03-29 PROCEDURE — 99232 SBSQ HOSP IP/OBS MODERATE 35: CPT | Performed by: INTERNAL MEDICINE

## 2021-03-29 PROCEDURE — 82948 REAGENT STRIP/BLOOD GLUCOSE: CPT

## 2021-03-29 PROCEDURE — 80048 BASIC METABOLIC PNL TOTAL CA: CPT | Performed by: INTERNAL MEDICINE

## 2021-03-29 PROCEDURE — NC001 PR NO CHARGE: Performed by: OBSTETRICS & GYNECOLOGY

## 2021-03-29 PROCEDURE — 87806 HIV AG W/HIV1&2 ANTB W/OPTIC: CPT | Performed by: OBSTETRICS & GYNECOLOGY

## 2021-03-29 PROCEDURE — 85025 COMPLETE CBC W/AUTO DIFF WBC: CPT | Performed by: INTERNAL MEDICINE

## 2021-03-29 RX ADMIN — NYSTATIN: 100000 OINTMENT TOPICAL at 09:51

## 2021-03-29 RX ADMIN — METRONIDAZOLE 500 MG: 500 TABLET ORAL at 21:26

## 2021-03-29 RX ADMIN — NYSTATIN: 100000 OINTMENT TOPICAL at 18:11

## 2021-03-29 RX ADMIN — METFORMIN HYDROCHLORIDE 500 MG: 500 TABLET ORAL at 18:09

## 2021-03-29 RX ADMIN — GABAPENTIN 800 MG: 400 CAPSULE ORAL at 09:48

## 2021-03-29 RX ADMIN — CLONIDINE HYDROCHLORIDE 0.1 MG: 0.1 TABLET ORAL at 21:27

## 2021-03-29 RX ADMIN — INSULIN LISPRO 2 UNITS: 100 INJECTION, SOLUTION INTRAVENOUS; SUBCUTANEOUS at 21:28

## 2021-03-29 RX ADMIN — LEVOTHYROXINE SODIUM 25 MCG: 25 TABLET ORAL at 05:17

## 2021-03-29 RX ADMIN — INSULIN LISPRO 4 UNITS: 100 INJECTION, SOLUTION INTRAVENOUS; SUBCUTANEOUS at 12:18

## 2021-03-29 RX ADMIN — DULOXETINE HYDROCHLORIDE 30 MG: 30 CAPSULE, DELAYED RELEASE ORAL at 09:48

## 2021-03-29 RX ADMIN — METOPROLOL TARTRATE 50 MG: 50 TABLET, FILM COATED ORAL at 09:48

## 2021-03-29 RX ADMIN — INSULIN GLARGINE 10 UNITS: 100 INJECTION, SOLUTION SUBCUTANEOUS at 21:27

## 2021-03-29 RX ADMIN — TRAZODONE HYDROCHLORIDE 200 MG: 100 TABLET ORAL at 21:26

## 2021-03-29 RX ADMIN — Medication 1 PATCH: at 09:47

## 2021-03-29 RX ADMIN — VALACYCLOVIR HYDROCHLORIDE 1000 MG: 500 TABLET, FILM COATED ORAL at 21:27

## 2021-03-29 RX ADMIN — METRONIDAZOLE 500 MG: 500 TABLET ORAL at 09:48

## 2021-03-29 RX ADMIN — GABAPENTIN 800 MG: 400 CAPSULE ORAL at 21:27

## 2021-03-29 RX ADMIN — ENOXAPARIN SODIUM 40 MG: 40 INJECTION SUBCUTANEOUS at 09:50

## 2021-03-29 RX ADMIN — INSULIN LISPRO 3 UNITS: 100 INJECTION, SOLUTION INTRAVENOUS; SUBCUTANEOUS at 09:49

## 2021-03-29 RX ADMIN — METOPROLOL TARTRATE 50 MG: 50 TABLET, FILM COATED ORAL at 21:27

## 2021-03-29 RX ADMIN — GABAPENTIN 800 MG: 400 CAPSULE ORAL at 18:09

## 2021-03-29 RX ADMIN — ARIPIPRAZOLE 5 MG: 5 TABLET ORAL at 09:47

## 2021-03-29 RX ADMIN — INSULIN LISPRO 4 UNITS: 100 INJECTION, SOLUTION INTRAVENOUS; SUBCUTANEOUS at 18:10

## 2021-03-29 RX ADMIN — FOLIC ACID 1 MG: 1 TABLET ORAL at 09:48

## 2021-03-29 RX ADMIN — VALACYCLOVIR HYDROCHLORIDE 1000 MG: 500 TABLET, FILM COATED ORAL at 09:48

## 2021-03-29 NOTE — CASE MANAGEMENT
Discharge planning     ANNEL was notified at rounds that pt will need a Psych  Bed, CM sent tiger to Evelyn Vogt, Evelyn Vogt requested CM to send, Facesheet and [phone] ( consent for Voluntary inpatient Treatment)  ANNEL sent requested forms to fax number [phone], Pending bed availability      CM department will continue to follow through pt's D/C

## 2021-03-29 NOTE — PLAN OF CARE
Problem: NEUROSENSORY - ADULT  Goal: Achieves stable or improved neurological status  Description: INTERVENTIONS  - Monitor and report changes in neurological status  - Monitor vital signs such as temperature, blood pressure, glucose, and any other labs ordered   - Initiate measures to prevent increased intracranial pressure  - Monitor for seizure activity and implement precautions if appropriate      Outcome: Progressing  Goal: Remains free of injury related to seizures activity  Description: INTERVENTIONS  - Maintain airway, patient safety  and administer oxygen as ordered  - Monitor patient for seizure activity, document and report duration and description of seizure to physician/advanced practitioner  - If seizure occurs,  ensure patient safety during seizure  - Reorient patient post seizure  - Seizure pads on all 4 side rails  - Instruct patient/family to notify RN of any seizure activity including if an aura is experienced  - Instruct patient/family to call for assistance with activity based on nursing assessment  - Administer anti-seizure medications if ordered    Outcome: Progressing  Goal: Achieves maximal functionality and self care  Description: INTERVENTIONS  - Monitor swallowing and airway patency with patient fatigue and changes in neurological status  - Encourage and assist patient to increase activity and self care     - Encourage visually impaired, hearing impaired and aphasic patients to use assistive/communication devices  Outcome: Progressing     Problem: CARDIOVASCULAR - ADULT  Goal: Maintains optimal cardiac output and hemodynamic stability  Description: INTERVENTIONS:  - Monitor I/O, vital signs and rhythm  - Monitor for S/S and trends of decreased cardiac output  - Administer and titrate ordered vasoactive medications to optimize hemodynamic stability  - Assess quality of pulses, skin color and temperature  - Assess for signs of decreased coronary artery perfusion  - Instruct patient to report change in severity of symptoms  Outcome: Progressing     Problem: GASTROINTESTINAL - ADULT  Goal: Minimal or absence of nausea and/or vomiting  Description: INTERVENTIONS:  - Administer IV fluids if ordered to ensure adequate hydration  - Maintain NPO status until nausea and vomiting are resolved  - Nasogastric tube if ordered  - Administer ordered antiemetic medications as needed  - Provide nonpharmacologic comfort measures as appropriate  - Advance diet as tolerated, if ordered  - Consider nutrition services referral to assist patient with adequate nutrition and appropriate food choices  Outcome: Progressing  Goal: Maintains or returns to baseline bowel function  Description: INTERVENTIONS:  - Assess bowel function  - Encourage oral fluids to ensure adequate hydration  - Administer IV fluids if ordered to ensure adequate hydration  - Administer ordered medications as needed  - Encourage mobilization and activity  - Consider nutritional services referral to assist patient with adequate nutrition and appropriate food choices  Outcome: Progressing  Goal: Maintains adequate nutritional intake  Description: INTERVENTIONS:  - Monitor percentage of each meal consumed  - Identify factors contributing to decreased intake, treat as appropriate  - Assist with meals as needed  - Monitor I&O, weight, and lab values if indicated  - Obtain nutrition services referral as needed  Outcome: Progressing     Problem: GENITOURINARY - ADULT  Goal: Maintains or returns to baseline urinary function  Description: INTERVENTIONS:  - Assess urinary function  - Encourage oral fluids to ensure adequate hydration if ordered  - Administer IV fluids as ordered to ensure adequate hydration  - Administer ordered medications as needed  - Offer frequent toileting  - Follow urinary retention protocol if ordered  Outcome: Progressing     Problem: METABOLIC, FLUID AND ELECTROLYTES - ADULT  Goal: Electrolytes maintained within normal limits  Description: INTERVENTIONS:  - Monitor labs and assess patient for signs and symptoms of electrolyte imbalances  - Administer electrolyte replacement as ordered  - Monitor response to electrolyte replacements, including repeat lab results as appropriate  - Instruct patient on fluid and nutrition as appropriate  Outcome: Progressing  Goal: Fluid balance maintained  Description: INTERVENTIONS:  - Monitor labs   - Monitor I/O and WT  - Instruct patient on fluid and nutrition as appropriate  - Assess for signs & symptoms of volume excess or deficit  Outcome: Progressing  Goal: Glucose maintained within target range  Description: INTERVENTIONS:  - Monitor Blood Glucose as ordered  - Assess for signs and symptoms of hyperglycemia and hypoglycemia  - Administer ordered medications to maintain glucose within target range  - Assess nutritional intake and initiate nutrition service referral as needed  Outcome: Progressing     Problem: SKIN/TISSUE INTEGRITY - ADULT  Goal: Skin integrity remains intact  Description: INTERVENTIONS  - Identify patients at risk for skin breakdown  - Assess and monitor skin integrity  - Assess and monitor nutrition and hydration status  - Monitor labs (i e  albumin)  - Assess for incontinence   - Turn and reposition patient  - Assist with mobility/ambulation  - Relieve pressure over bony prominences  - Avoid friction and shearing  - Provide appropriate hygiene as needed including keeping skin clean and dry  - Evaluate need for skin moisturizer/barrier cream  - Collaborate with interdisciplinary team (i e  Nutrition, Rehabilitation, etc )   - Patient/family teaching  Outcome: Progressing  Goal: Oral mucous membranes remain intact  Description: INTERVENTIONS  - Assess oral mucosa and hygiene practices  - Implement preventative oral hygiene regimen  - Implement oral medicated treatments as ordered  - Initiate Nutrition services referral as needed  Outcome: Progressing     Problem: HEMATOLOGIC - ADULT  Goal: Maintains hematologic stability  Description: INTERVENTIONS  - Assess for signs and symptoms of bleeding or hemorrhage  - Monitor labs  - Administer supportive blood products/factors as ordered and appropriate  Outcome: Progressing     Problem: MUSCULOSKELETAL - ADULT  Goal: Maintain or return mobility to safest level of function  Description: INTERVENTIONS:  - Assess patient's ability to carry out ADLs; assess patient's baseline for ADL function and identify physical deficits which impact ability to perform ADLs (bathing, care of mouth/teeth, toileting, grooming, dressing, etc )  - Assess/evaluate cause of self-care deficits   - Assess range of motion  - Assess patient's mobility  - Assess patient's need for assistive devices and provide as appropriate  - Encourage maximum independence but intervene and supervise when necessary  - Involve family in performance of ADLs  - Assess for home care needs following discharge   - Consider OT consult to assist with ADL evaluation and planning for discharge  - Provide patient education as appropriate  Outcome: Progressing  Goal: Maintain proper alignment of affected body part  Description: INTERVENTIONS:  - Support, maintain and protect limb and body alignment  - Provide patient/ family with appropriate education  Outcome: Progressing     Problem: Potential for Falls  Goal: Patient will remain free of falls  Description: INTERVENTIONS:  - Assess patient frequently for physical needs  -  Identify cognitive and physical deficits and behaviors that affect risk of falls    -  [place] fall precautions as indicated by assessment   - Educate patient/family on patient safety including physical limitations  - Instruct patient to call for assistance with activity based on assessment  - Modify environment to reduce risk of injury  - Consider OT/PT consult to assist with strengthening/mobility  Outcome: Progressing

## 2021-03-29 NOTE — PROGRESS NOTES
Gynecology Progress note   Jose Wall 50 y o  female MRN: [id number]  Unit/Bed#: E5 -01 Encounter: [id number]    Subjective:    Jose Wall still reports some vulvar pain particularly when voiding, but states that it is better with the Nystatin cream  No other symptoms at this time  /99 (BP Location: Left arm)   Pulse 72   Temp 97 6 °F (36 4 °C) (Temporal)   Resp 18   Ht 5' 2 5" (1 588 m)   Wt 86 6 kg (190 lb 14 7 oz)   SpO2 97%   BMI 34 36 kg/m²     I/O last 3 completed shifts: In: 600 [I V :600]  Out: -   No intake/output data recorded  Lab Results   Component Value Date    WBC 9 24 03/29/2021    HGB 11 7 03/29/2021    HCT 37 8 03/29/2021     (H) 03/29/2021     03/29/2021       Lab Results   Component Value Date    GLUCOSE 97 01/03/2016    CALCIUM 8 9 03/29/2021     01/03/2016    K 4 1 03/29/2021    CO2 30 03/29/2021     03/29/2021    BUN 6 03/29/2021    CREATININE 0 55 (L) 03/29/2021       Lab Results   Component Value Date/Time    POCGLU 239 (H) 03/29/2021 07:08 AM    POCGLU 196 (H) 03/28/2021 09:31 PM    POCGLU 281 (H) 03/28/2021 04:32 PM    POCGLU 252 (H) 03/28/2021 11:39 AM    POCGLU 238 (H) 03/28/2021 07:04 AM         Physical Exam  Constitutional:       General: She is not in acute distress  Appearance: She is not ill-appearing or toxic-appearing  HENT:      Head: Normocephalic and atraumatic  Genitourinary:     Comments: Vulvar and labial excoriations that are erythematous, no bleeding, no pustules, no discharge   Neurological:      General: No focal deficit present  Mental Status: She is alert and oriented to person, place, and time  Mental status is at baseline  Psychiatric:         Mood and Affect: Mood normal          Thought Content: Thought content normal          A/P: 50 y o  admitted to for metabolic acidosis and SI, with vulvar irration likely secondary to herpes infections and candidiasis      1  Vulvovaginal candidiasis - S/p Fluconazole PO x1  - Continue Nystatin cream to the affected areas BID    2  HSV   - Continue Valtrex 1g BID x10 days  - Will place follow up instructions in discharge, needs to be seen in our clinic and if excoriations do not improve, will need to biopsy the affected area     3   Trichomonas on UA  - Continue Flagyl 500 mg BID x7 days  - Hepatitis Panel normal  - RPR, G&C pedning, HIV ordered today     Dispo and care per primary team      Julianna Villanueva MD  3/29/2021  7:44 AM

## 2021-03-29 NOTE — PROGRESS NOTES
Progress Note - Mayank Talley 50 y o  female MRN: [id number]    Unit/Bed#: E5 -01 Encounter: [id number]      Subjective: The patient feels reasonably well  She slept okay  She is eating and drinking pretty well  She has no abdominal pain, nausea, or vomiting  She denies chest pain or shortness of breath  Physical Exam:   Temp:  [97 6 °F (36 4 °C)-98 5 °F (36 9 °C)] 97 6 °F (36 4 °C)  HR:  [72-77] 72  Resp:  [18] 18  BP: (135-157)/(72-99) 157/99    Gen:  Well-developed, obese, in no distress  Neck:  Supple  No lymphadenopathy, goiter, or bruit  Heart:  Regular rhythm  No murmur, gallop, or rub  Lungs:  Clear to auscultation and percussion  No wheezing, rales, or rhonchi    Abd:  Soft with active bowel sounds  No mass, tenderness, or organomegaly  Extremities:  No clubbing, cyanosis, or edema  No calf tenderness  Neuro:  Alert and oriented  No focal sign  Skin:  Warm and dry      LABS:   CBC:   Lab Results   Component Value Date    WBC 9 24 03/29/2021    HGB 11 7 03/29/2021    HCT 37 8 03/29/2021     (H) 03/29/2021     03/29/2021    MCH 31 9 03/29/2021    MCHC 31 0 (L) 03/29/2021    RDW 16 9 (H) 03/29/2021    MPV 9 7 03/29/2021    NRBC 0 03/29/2021   , CMP:   Lab Results   Component Value Date    SODIUM 140 03/29/2021    K 4 1 03/29/2021     03/29/2021    CO2 30 03/29/2021    BUN 6 03/29/2021    CREATININE 0 55 (L) 03/29/2021    CALCIUM 8 9 03/29/2021    EGFR 107 03/29/2021         Assessment/Plan:  1  Elevated anion gap metabolic acidosis, secondary to starvation, resolved  2  Herpes simplex type 2  3  Trichomoniasis  4  Bipolar affective disorder  5  Type 2 diabetes, controlled  6  Hypothyroidism   7  Tobacco abuse   8  Hypertension  9  Obesity   BMI Counseling: Body mass index is 34 36 kg/m²  The BMI is above normal  Nutrition recommendations include reducing portion sizes and moderation in carbohydrate intake   Exercise recommendations include moderate aerobic physical activity for 150 minutes/week  The patient is improving  Her metabolic derangements have resolved  She thinks that her psychiatric status is better  She knows there are no psych beds at the moment thinks that she could go home  I have requested that Psychiatry re-evaluate the patient  She remains on valacyclovir and metronidazole  Metformin will be instituted for improved diabetic control      VTE Pharmacologic Prophylaxis: Enoxaparin (Lovenox)  VTE Mechanical Prophylaxis: sequential compression device

## 2021-03-30 LAB
C TRACH DNA SPEC QL NAA+PROBE: NEGATIVE
GLUCOSE SERPL-MCNC: 166 MG/DL (ref 65–140)
GLUCOSE SERPL-MCNC: 173 MG/DL (ref 65–140)
GLUCOSE SERPL-MCNC: 207 MG/DL (ref 65–140)
GLUCOSE SERPL-MCNC: 209 MG/DL (ref 65–140)
N GONORRHOEA DNA SPEC QL NAA+PROBE: NEGATIVE

## 2021-03-30 PROCEDURE — G0425 INPT/ED TELECONSULT30: HCPCS | Performed by: PSYCHIATRY & NEUROLOGY

## 2021-03-30 PROCEDURE — 82948 REAGENT STRIP/BLOOD GLUCOSE: CPT

## 2021-03-30 PROCEDURE — 99232 SBSQ HOSP IP/OBS MODERATE 35: CPT | Performed by: INTERNAL MEDICINE

## 2021-03-30 RX ADMIN — TRAZODONE HYDROCHLORIDE 200 MG: 100 TABLET ORAL at 22:40

## 2021-03-30 RX ADMIN — METFORMIN HYDROCHLORIDE 500 MG: 500 TABLET ORAL at 17:07

## 2021-03-30 RX ADMIN — GABAPENTIN 800 MG: 400 CAPSULE ORAL at 08:28

## 2021-03-30 RX ADMIN — VALACYCLOVIR HYDROCHLORIDE 1000 MG: 500 TABLET, FILM COATED ORAL at 08:25

## 2021-03-30 RX ADMIN — GABAPENTIN 800 MG: 400 CAPSULE ORAL at 17:06

## 2021-03-30 RX ADMIN — METOPROLOL TARTRATE 50 MG: 50 TABLET, FILM COATED ORAL at 22:41

## 2021-03-30 RX ADMIN — INSULIN LISPRO 1 UNITS: 100 INJECTION, SOLUTION INTRAVENOUS; SUBCUTANEOUS at 17:07

## 2021-03-30 RX ADMIN — INSULIN LISPRO 2 UNITS: 100 INJECTION, SOLUTION INTRAVENOUS; SUBCUTANEOUS at 12:00

## 2021-03-30 RX ADMIN — INSULIN LISPRO 2 UNITS: 100 INJECTION, SOLUTION INTRAVENOUS; SUBCUTANEOUS at 08:24

## 2021-03-30 RX ADMIN — NYSTATIN: 100000 OINTMENT TOPICAL at 08:23

## 2021-03-30 RX ADMIN — ENOXAPARIN SODIUM 40 MG: 40 INJECTION SUBCUTANEOUS at 08:24

## 2021-03-30 RX ADMIN — DULOXETINE HYDROCHLORIDE 30 MG: 30 CAPSULE, DELAYED RELEASE ORAL at 08:27

## 2021-03-30 RX ADMIN — VALACYCLOVIR HYDROCHLORIDE 1000 MG: 500 TABLET, FILM COATED ORAL at 22:43

## 2021-03-30 RX ADMIN — CLONIDINE HYDROCHLORIDE 0.1 MG: 0.1 TABLET ORAL at 22:36

## 2021-03-30 RX ADMIN — INSULIN LISPRO 1 UNITS: 100 INJECTION, SOLUTION INTRAVENOUS; SUBCUTANEOUS at 22:44

## 2021-03-30 RX ADMIN — GABAPENTIN 800 MG: 400 CAPSULE ORAL at 22:40

## 2021-03-30 RX ADMIN — NYSTATIN: 100000 OINTMENT TOPICAL at 17:08

## 2021-03-30 RX ADMIN — METOPROLOL TARTRATE 50 MG: 50 TABLET, FILM COATED ORAL at 08:28

## 2021-03-30 RX ADMIN — Medication 1 PATCH: at 08:29

## 2021-03-30 RX ADMIN — METFORMIN HYDROCHLORIDE 500 MG: 500 TABLET ORAL at 08:28

## 2021-03-30 RX ADMIN — ARIPIPRAZOLE 5 MG: 5 TABLET ORAL at 08:27

## 2021-03-30 RX ADMIN — METRONIDAZOLE 500 MG: 500 TABLET ORAL at 08:28

## 2021-03-30 RX ADMIN — FOLIC ACID 1 MG: 1 TABLET ORAL at 08:27

## 2021-03-30 RX ADMIN — LEVOTHYROXINE SODIUM 25 MCG: 25 TABLET ORAL at 04:50

## 2021-03-30 RX ADMIN — INSULIN GLARGINE 10 UNITS: 100 INJECTION, SOLUTION SUBCUTANEOUS at 22:40

## 2021-03-30 NOTE — PLAN OF CARE
Problem: NEUROSENSORY - ADULT  Goal: Achieves stable or improved neurological status  Description: INTERVENTIONS  - Monitor and report changes in neurological status  - Monitor vital signs such as temperature, blood pressure, glucose, and any other labs ordered   - Initiate measures to prevent increased intracranial pressure  - Monitor for seizure activity and implement precautions if appropriate      Outcome: Progressing  Goal: Remains free of injury related to seizures activity  Description: INTERVENTIONS  - Maintain airway, patient safety  and administer oxygen as ordered  - Monitor patient for seizure activity, document and report duration and description of seizure to physician/advanced practitioner  - If seizure occurs,  ensure patient safety during seizure  - Reorient patient post seizure  - Seizure pads on all 4 side rails  - Instruct patient/family to notify RN of any seizure activity including if an aura is experienced  - Instruct patient/family to call for assistance with activity based on nursing assessment  - Administer anti-seizure medications if ordered    Outcome: Progressing  Goal: Achieves maximal functionality and self care  Description: INTERVENTIONS  - Monitor swallowing and airway patency with patient fatigue and changes in neurological status  - Encourage and assist patient to increase activity and self care     - Encourage visually impaired, hearing impaired and aphasic patients to use assistive/communication devices  Outcome: Progressing     Problem: CARDIOVASCULAR - ADULT  Goal: Maintains optimal cardiac output and hemodynamic stability  Description: INTERVENTIONS:  - Monitor I/O, vital signs and rhythm  - Monitor for S/S and trends of decreased cardiac output  - Administer and titrate ordered vasoactive medications to optimize hemodynamic stability  - Assess quality of pulses, skin color and temperature  - Assess for signs of decreased coronary artery perfusion  - Instruct patient to report change in severity of symptoms  Outcome: Progressing     Problem: GASTROINTESTINAL - ADULT  Goal: Minimal or absence of nausea and/or vomiting  Description: INTERVENTIONS:  - Administer IV fluids if ordered to ensure adequate hydration  - Maintain NPO status until nausea and vomiting are resolved  - Nasogastric tube if ordered  - Administer ordered antiemetic medications as needed  - Provide nonpharmacologic comfort measures as appropriate  - Advance diet as tolerated, if ordered  - Consider nutrition services referral to assist patient with adequate nutrition and appropriate food choices  Outcome: Progressing  Goal: Maintains or returns to baseline bowel function  Description: INTERVENTIONS:  - Assess bowel function  - Encourage oral fluids to ensure adequate hydration  - Administer IV fluids if ordered to ensure adequate hydration  - Administer ordered medications as needed  - Encourage mobilization and activity  - Consider nutritional services referral to assist patient with adequate nutrition and appropriate food choices  Outcome: Progressing  Goal: Maintains adequate nutritional intake  Description: INTERVENTIONS:  - Monitor percentage of each meal consumed  - Identify factors contributing to decreased intake, treat as appropriate  - Assist with meals as needed  - Monitor I&O, weight, and lab values if indicated  - Obtain nutrition services referral as needed  Outcome: Progressing     Problem: GENITOURINARY - ADULT  Goal: Maintains or returns to baseline urinary function  Description: INTERVENTIONS:  - Assess urinary function  - Encourage oral fluids to ensure adequate hydration if ordered  - Administer IV fluids as ordered to ensure adequate hydration  - Administer ordered medications as needed  - Offer frequent toileting  - Follow urinary retention protocol if ordered  Outcome: Progressing     Problem: METABOLIC, FLUID AND ELECTROLYTES - ADULT  Goal: Electrolytes maintained within normal limits  Description: INTERVENTIONS:  - Monitor labs and assess patient for signs and symptoms of electrolyte imbalances  - Administer electrolyte replacement as ordered  - Monitor response to electrolyte replacements, including repeat lab results as appropriate  - Instruct patient on fluid and nutrition as appropriate  Outcome: Progressing  Goal: Fluid balance maintained  Description: INTERVENTIONS:  - Monitor labs   - Monitor I/O and WT  - Instruct patient on fluid and nutrition as appropriate  - Assess for signs & symptoms of volume excess or deficit  Outcome: Progressing  Goal: Glucose maintained within target range  Description: INTERVENTIONS:  - Monitor Blood Glucose as ordered  - Assess for signs and symptoms of hyperglycemia and hypoglycemia  - Administer ordered medications to maintain glucose within target range  - Assess nutritional intake and initiate nutrition service referral as needed  Outcome: Progressing     Problem: SKIN/TISSUE INTEGRITY - ADULT  Goal: Skin integrity remains intact  Description: INTERVENTIONS  - Identify patients at risk for skin breakdown  - Assess and monitor skin integrity  - Assess and monitor nutrition and hydration status  - Monitor labs (i e  albumin)  - Assess for incontinence   - Turn and reposition patient  - Assist with mobility/ambulation  - Relieve pressure over bony prominences  - Avoid friction and shearing  - Provide appropriate hygiene as needed including keeping skin clean and dry  - Evaluate need for skin moisturizer/barrier cream  - Collaborate with interdisciplinary team (i e  Nutrition, Rehabilitation, etc )   - Patient/family teaching  Outcome: Progressing  Goal: Oral mucous membranes remain intact  Description: INTERVENTIONS  - Assess oral mucosa and hygiene practices  - Implement preventative oral hygiene regimen  - Implement oral medicated treatments as ordered  - Initiate Nutrition services referral as needed  Outcome: Progressing     Problem: HEMATOLOGIC - ADULT  Goal: Maintains hematologic stability  Description: INTERVENTIONS  - Assess for signs and symptoms of bleeding or hemorrhage  - Monitor labs  - Administer supportive blood products/factors as ordered and appropriate  Outcome: Progressing     Problem: MUSCULOSKELETAL - ADULT  Goal: Maintain or return mobility to safest level of function  Description: INTERVENTIONS:  - Assess patient's ability to carry out ADLs; assess patient's baseline for ADL function and identify physical deficits which impact ability to perform ADLs (bathing, care of mouth/teeth, toileting, grooming, dressing, etc )  - Assess/evaluate cause of self-care deficits   - Assess range of motion  - Assess patient's mobility  - Assess patient's need for assistive devices and provide as appropriate  - Encourage maximum independence but intervene and supervise when necessary  - Involve family in performance of ADLs  - Assess for home care needs following discharge   - Consider OT consult to assist with ADL evaluation and planning for discharge  - Provide patient education as appropriate  Outcome: Progressing  Goal: Maintain proper alignment of affected body part  Description: INTERVENTIONS:  - Support, maintain and protect limb and body alignment  - Provide patient/ family with appropriate education  Outcome: Progressing     Problem: Potential for Falls  Goal: Patient will remain free of falls  Description: INTERVENTIONS:  - Assess patient frequently for physical needs  -  Identify cognitive and physical deficits and behaviors that affect risk of falls    -  Alpha fall precautions as indicated by assessment   - Educate patient/family on patient safety including physical limitations  - Instruct patient to call for assistance with activity based on assessment  - Modify environment to reduce risk of injury  - Consider OT/PT consult to assist with strengthening/mobility  Outcome: Progressing     Problem: Nutrition/Hydration-ADULT  Goal: Nutrient/Hydration intake appropriate for improving, restoring or maintaining nutritional needs  Description: Monitor and assess patient's nutrition/hydration status for malnutrition  Collaborate with interdisciplinary team and initiate plan and interventions as ordered  Monitor patient's weight and dietary intake as ordered or per policy  Utilize nutrition screening tool and intervene as necessary  Determine patient's food preferences and provide high-protein, high-caloric foods as appropriate       INTERVENTIONS:  - Monitor oral intake, urinary output, labs, and treatment plans  - Assess nutrition and hydration status and recommend course of action  - Evaluate amount of meals eaten  - Assist patient with eating if necessary   - Allow adequate time for meals  - Recommend/ encourage appropriate diets, oral nutritional supplements, and vitamin/mineral supplements  - Order, calculate, and assess calorie counts as needed  - Recommend, monitor, and adjust tube feedings and TPN/PPN based on assessed needs  - Assess need for intravenous fluids  - Provide specific nutrition/hydration education as appropriate  - Include patient/family/caregiver in decisions related to nutrition  Outcome: Progressing

## 2021-03-30 NOTE — CONSULTS
REQUIRED DOCUMENTATION:     1  This service was provided via Telemedicine  2  Provider located at [facility]  3  TeleMed provider: Beto Brown DO   4  Identify all parties in room with patient during tele consult:  Beto Brown DO and Cody Andre  5  After connecting through televideo, patient was identified by name and date of birth and assistant checked wristband  Patient was then informed that this was a Telemedicine visit and that the exam was being conducted confidentially over secure lines  My office door was closed  No one else was in the room  Patient acknowledged consent and understanding of privacy and security of the Telemedicine visit, and gave us permission to have the assistant stay in the room in order to assist with the history and to conduct the exam   I informed the patient that I have reviewed their record in Epic and presented the opportunity for them to ask any questions regarding the visit today  The patient agreed to participate  Progress Note - Behavioral Health   Hi Sheri 50 y o  female MRN: [id number]  Unit/Bed#: E5 -01 Encounter: [id number]      Delonte Ramirez is a 50 y o  female with with past psychiatric history of bipolar 1 disorder, alcohol use disorder, and PTSD who presents to [facility] emergency department due to suicidal ideation and plan to overdose on pills in addition to high level of anxiety  Patient now states that she does not have any suicidal or homicidal ideations in further states that her passive suicidal ideations have cleared and she now states I want to live    Reports that she has good family support and good outpatient follow-up with a therapist and psychiatrist who she sees monthly  Reports that she is doing well on medications started during this hospitalization and denies any side effects  Reports improvement in her concentration, energy level, and ability to eat    Reports that she is getting 5-6 hours of sleep which is an improvement since last interview but states this is still slightly less than she gets at home due to hospital environment  Reports that she still has some anxiety but she is able to deal with that as she is now able to occupy her mind other things  Denies any manic symptoms  States that she does not believe she needs inpatient psychiatry now and is confident in her ability to follow up outpatient  Denies AV  Collateral: Son, Hema Chaudhari, is in agreement with discharge plan of continuing with follow-up as outpatient states that he does not believe his mom is not acute danger to herself or others and does not require inpatient psychiatric hospitalization  States that he is willing to keep a closer eye on her and make sure that she gets to her appointments and takes her medications as prescribed  States that the patient's ex-boyfriend is no longer living in the house who was a major trigger for many of her psychiatric concerns  Behavior over the last 24 hours: improved  Sleep: improving  Appetite: adequate  Medication side effects: none verbalized  ROS: Complete review of systems is negative except as noted above      Mental Status Exam:  Appearance:  alert, good eye contact, appropriate grooming and hygiene and overweight   Behavior:  calm, cooperative and laying in bed   Motor: no abnormal movements and normal gait and balance   Speech:  spontaneous, normal rate, normal volume and coherent   Mood:  "I feel my old self"   Affect:  mood-congruent, euthymic and brighter than previous days   Thought Process:  organized, goal directed, normal rate of thoughts   Thought Content: no verbalized delusions or overt paranoia   Perceptual disturbances: no reported hallucinations and does not appear to be responding to internal stimuli at this time   Risk Potential: No active or passive suicidal or homicidal ideation was verbalized during interview, Low potential for aggression based on previous behavior   Cognition: oriented to self and situation, memory grossly intact, appears to be of average intelligence, age-appropriate attention span and concentration and cognition not formally tested   Insight:  Good   Judgment: Good     Risks, benefits and possible side effects of Medications:   Risks, benefits, and possible side effects of medications have been explained to the patient, who verbalizes understanding  Labs: I have personally reviewed all pertinent laboratory results  Assessment/Plan  Chaya Campbell is a 50 y o  female past psychiatric history of bipolar 1 disorder, alcohol use disorder, and PTSD who presents to [facility] emergency department due to suicidal ideation and plan to overdose on pills in addition to high level of anxiety  Patient denies affective symptoms and reports improvement in her anxiety  She is now eating well and sleeping better  She no longer screens positive for acute episode of depressive bipolar  Denies SI/HI/AVH and is displaying no symptoms of doc  She has good family support and tight outpatient follow up schedule  Will discharge patient on home medications and those started in the hospital as patient notes improvement  Patient is no longer a danger to herself or others and does not need inpatient psychiatric hospitalization  Patient is psychiatrically cleared for discharge once medically stable      Diagnosis: 1) Bipolar 1 Disorder, current episode depressed without psychotic features 2) PTSD    Recommended Treatment:   1) Continue Cymbalta 30 mg QD for mood  2) Continue Gabapentin 800 mg TID for anxiety  3) Continue Clonidine 0 1 mg QHS for PTSD symptoms  4) Continue Trazodone 200 mg QHS for sleeping difficulties  5) Continue Abilify 5 mg daily for mood stabilization  6) Continue Atarax to 50 mg Q12 PRN for anxiety  7) patient is no longer due to herself or others and does not require inpatient psychiatric hospitalization  8) rest of care per primary team inpatient be discharged once medically cleared    Lisa Castillo, DO

## 2021-03-30 NOTE — PROGRESS NOTES
Progress Note - Evgeny Baca 50 y o  female MRN: [id number]    Unit/Bed#: E5 -01 Encounter: [id number]      Subjective:  Patient is feeling reasonably well  She has no chest pain, shortness of breath, abdominal pain, nausea, or vomiting  She slept well  She is eating well  Her mood has improved  Physical Exam:   Temp:  [97 3 °F (36 3 °C)-98 5 °F (36 9 °C)] 98 5 °F (36 9 °C)  HR:  [66-81] 78  Resp:  [18] 18  BP: (121-160)/(71-97) 146/71    Gen:  Well-developed, obese, in no distress  Neck:  Supple  No lymphadenopathy, goiter, or bruit  Heart:  Regular rhythm  No murmur, gallop, or rub  Lungs:  Clear to auscultation and percussion  No wheezing, rales, or rhonchi    Abd:  Soft with active bowel sounds  No mass, tenderness, or organomegaly  Extremities:  No clubbing, cyanosis, or edema  No calf tenderness  Neuro:  Alert and oriented  No focal sign  Skin:  Warm and dry      LABS:  No new labs    Patient Active Problem List   Diagnosis    Essential hypertension    Alcohol use disorder, moderate, dependence ([facility])    Post-traumatic stress disorder, chronic    Generalized anxiety disorder    Bipolar disorder current episode depressed ([place] [address] )    Type 2 diabetes mellitus without complication, without long-term current use of insulin ([facility])    Bipolar I disorder with anxious distress ([facility] )    Benzodiazepine dependence, continuous ([facility] )    Non compliance w medication regimen    Acquired hypothyroidism    Hypokalemia    Hyperlipemia    Transaminitis    Cognitive dysfunction in bipolar disorder ([facility] )    Medical clearance for psychiatric admission    Tobacco abuse    Elevated lithium level    Lithium toxicity    Toxic encephalopathy    Gastroesophageal reflux disease without esophagitis    Blepharitis of right upper eyelid    Increased anion gap metabolic acidosis    STI (sexually transmitted infection)    Diarrhea       Assessment/Plan:  1   Elevated anion gap metabolic acidosis secondary to starvation  2  Herpes simplex type 2  3  Trichomoniasis  4  Bipolar affective disorder  5  Type 2 diabetes  6  Hypothyroidism  7  Tobacco abuse  8  Hypertension  9  Obesity    The patient has improved  She was re-evaluated by Psychiatry and in patient care is not now thought to be necessary  The patient cannot get her medications until Friday because her checked does not come until then  Hopefully, we can resolve this issue with the assistance of Case Management tomorrow morning        VTE Pharmacologic Prophylaxis: Enoxaparin (Lovenox)  VTE Mechanical Prophylaxis: sequential compression device

## 2021-03-31 VITALS
RESPIRATION RATE: 18 BRPM | OXYGEN SATURATION: 96 % | SYSTOLIC BLOOD PRESSURE: 162 MMHG | TEMPERATURE: 98.2 F | WEIGHT: 190.92 LBS | HEIGHT: 63 IN | BODY MASS INDEX: 33.83 KG/M2 | DIASTOLIC BLOOD PRESSURE: 95 MMHG | HEART RATE: 77 BPM

## 2021-03-31 PROBLEM — R79.89 ELEVATED LITHIUM LEVEL: Status: RESOLVED | Noted: 2020-09-26 | Resolved: 2021-03-31

## 2021-03-31 PROBLEM — H01.001 BLEPHARITIS OF RIGHT UPPER EYELID: Status: RESOLVED | Noted: 2020-10-06 | Resolved: 2021-03-31

## 2021-03-31 PROBLEM — T56.891A LITHIUM TOXICITY: Status: RESOLVED | Noted: 2020-09-26 | Resolved: 2021-03-31

## 2021-03-31 PROBLEM — R19.7 DIARRHEA: Status: RESOLVED | Noted: 2021-03-26 | Resolved: 2021-03-31

## 2021-03-31 PROBLEM — Z00.8 MEDICAL CLEARANCE FOR PSYCHIATRIC ADMISSION: Status: RESOLVED | Noted: 2020-09-03 | Resolved: 2021-03-31

## 2021-03-31 LAB
GLUCOSE SERPL-MCNC: 167 MG/DL (ref 65–140)
GLUCOSE SERPL-MCNC: 235 MG/DL (ref 65–140)

## 2021-03-31 PROCEDURE — 82948 REAGENT STRIP/BLOOD GLUCOSE: CPT

## 2021-03-31 PROCEDURE — 99239 HOSP IP/OBS DSCHRG MGMT >30: CPT | Performed by: INTERNAL MEDICINE

## 2021-03-31 RX ORDER — HYDROXYZINE 50 MG/1
50 TABLET, FILM COATED ORAL 2 TIMES DAILY PRN
Qty: 30 TABLET | Refills: 0 | Status: SHIPPED | OUTPATIENT
Start: 2021-03-31 | End: 2022-04-12 | Stop reason: SDUPTHER

## 2021-03-31 RX ORDER — GABAPENTIN 400 MG/1
800 CAPSULE ORAL 3 TIMES DAILY
Qty: 90 CAPSULE | Refills: 0 | Status: SHIPPED | OUTPATIENT
Start: 2021-03-31 | End: 2022-07-29 | Stop reason: SDUPTHER

## 2021-03-31 RX ORDER — METOPROLOL TARTRATE 50 MG/1
50 TABLET, FILM COATED ORAL EVERY 12 HOURS SCHEDULED
Qty: 60 TABLET | Refills: 0 | Status: SHIPPED | OUTPATIENT
Start: 2021-03-31 | End: 2022-03-31 | Stop reason: CLARIF

## 2021-03-31 RX ORDER — ARIPIPRAZOLE 5 MG/1
5 TABLET ORAL DAILY
Qty: 30 TABLET | Refills: 0 | Status: SHIPPED | OUTPATIENT
Start: 2021-03-31 | End: 2022-01-03 | Stop reason: SDUPTHER

## 2021-03-31 RX ADMIN — NYSTATIN: 100000 OINTMENT TOPICAL at 09:35

## 2021-03-31 RX ADMIN — FOLIC ACID 1 MG: 1 TABLET ORAL at 09:34

## 2021-03-31 RX ADMIN — LEVOTHYROXINE SODIUM 25 MCG: 25 TABLET ORAL at 05:47

## 2021-03-31 RX ADMIN — DULOXETINE HYDROCHLORIDE 30 MG: 30 CAPSULE, DELAYED RELEASE ORAL at 09:32

## 2021-03-31 RX ADMIN — Medication 1 PATCH: at 09:36

## 2021-03-31 RX ADMIN — ARIPIPRAZOLE 5 MG: 5 TABLET ORAL at 09:32

## 2021-03-31 RX ADMIN — VALACYCLOVIR HYDROCHLORIDE 1000 MG: 500 TABLET, FILM COATED ORAL at 09:38

## 2021-03-31 RX ADMIN — INSULIN LISPRO 3 UNITS: 100 INJECTION, SOLUTION INTRAVENOUS; SUBCUTANEOUS at 12:19

## 2021-03-31 RX ADMIN — METFORMIN HYDROCHLORIDE 500 MG: 500 TABLET ORAL at 09:34

## 2021-03-31 RX ADMIN — GABAPENTIN 800 MG: 400 CAPSULE ORAL at 09:32

## 2021-03-31 RX ADMIN — INSULIN LISPRO 1 UNITS: 100 INJECTION, SOLUTION INTRAVENOUS; SUBCUTANEOUS at 09:35

## 2021-03-31 RX ADMIN — ENOXAPARIN SODIUM 40 MG: 40 INJECTION SUBCUTANEOUS at 09:35

## 2021-03-31 RX ADMIN — METOPROLOL TARTRATE 50 MG: 50 TABLET, FILM COATED ORAL at 09:38

## 2021-03-31 NOTE — PLAN OF CARE
Problem: NEUROSENSORY - ADULT  Goal: Achieves stable or improved neurological status  Description: INTERVENTIONS  - Monitor and report changes in neurological status  - Monitor vital signs such as temperature, blood pressure, glucose, and any other labs ordered   - Initiate measures to prevent increased intracranial pressure  - Monitor for seizure activity and implement precautions if appropriate      Outcome: Progressing  Goal: Remains free of injury related to seizures activity  Description: INTERVENTIONS  - Maintain airway, patient safety  and administer oxygen as ordered  - Monitor patient for seizure activity, document and report duration and description of seizure to physician/advanced practitioner  - If seizure occurs,  ensure patient safety during seizure  - Reorient patient post seizure  - Seizure pads on all 4 side rails  - Instruct patient/family to notify RN of any seizure activity including if an aura is experienced  - Instruct patient/family to call for assistance with activity based on nursing assessment  - Administer anti-seizure medications if ordered    Outcome: Progressing  Goal: Achieves maximal functionality and self care  Description: INTERVENTIONS  - Monitor swallowing and airway patency with patient fatigue and changes in neurological status  - Encourage and assist patient to increase activity and self care     - Encourage visually impaired, hearing impaired and aphasic patients to use assistive/communication devices  Outcome: Progressing     Problem: CARDIOVASCULAR - ADULT  Goal: Maintains optimal cardiac output and hemodynamic stability  Description: INTERVENTIONS:  - Monitor I/O, vital signs and rhythm  - Monitor for S/S and trends of decreased cardiac output  - Administer and titrate ordered vasoactive medications to optimize hemodynamic stability  - Assess quality of pulses, skin color and temperature  - Assess for signs of decreased coronary artery perfusion  - Instruct patient to report change in severity of symptoms  Outcome: Progressing     Problem: GASTROINTESTINAL - ADULT  Goal: Minimal or absence of nausea and/or vomiting  Description: INTERVENTIONS:  - Administer IV fluids if ordered to ensure adequate hydration  - Maintain NPO status until nausea and vomiting are resolved  - Nasogastric tube if ordered  - Administer ordered antiemetic medications as needed  - Provide nonpharmacologic comfort measures as appropriate  - Advance diet as tolerated, if ordered  - Consider nutrition services referral to assist patient with adequate nutrition and appropriate food choices  Outcome: Progressing  Goal: Maintains or returns to baseline bowel function  Description: INTERVENTIONS:  - Assess bowel function  - Encourage oral fluids to ensure adequate hydration  - Administer IV fluids if ordered to ensure adequate hydration  - Administer ordered medications as needed  - Encourage mobilization and activity  - Consider nutritional services referral to assist patient with adequate nutrition and appropriate food choices  Outcome: Progressing  Goal: Maintains adequate nutritional intake  Description: INTERVENTIONS:  - Monitor percentage of each meal consumed  - Identify factors contributing to decreased intake, treat as appropriate  - Assist with meals as needed  - Monitor I&O, weight, and lab values if indicated  - Obtain nutrition services referral as needed  Outcome: Progressing     Problem: GENITOURINARY - ADULT  Goal: Maintains or returns to baseline urinary function  Description: INTERVENTIONS:  - Assess urinary function  - Encourage oral fluids to ensure adequate hydration if ordered  - Administer IV fluids as ordered to ensure adequate hydration  - Administer ordered medications as needed  - Offer frequent toileting  - Follow urinary retention protocol if ordered  Outcome: Progressing     Problem: SKIN/TISSUE INTEGRITY - ADULT  Goal: Skin integrity remains intact  Description: INTERVENTIONS  - Identify patients at risk for skin breakdown  - Assess and monitor skin integrity  - Assess and monitor nutrition and hydration status  - Monitor labs (i e  albumin)  - Assess for incontinence   - Turn and reposition patient  - Assist with mobility/ambulation  - Relieve pressure over bony prominences  - Avoid friction and shearing  - Provide appropriate hygiene as needed including keeping skin clean and dry  - Evaluate need for skin moisturizer/barrier cream  - Collaborate with interdisciplinary team (i e  Nutrition, Rehabilitation, etc )   - Patient/family teaching  Outcome: Progressing  Goal: Oral mucous membranes remain intact  Description: INTERVENTIONS  - Assess oral mucosa and hygiene practices  - Implement preventative oral hygiene regimen  - Implement oral medicated treatments as ordered  - Initiate Nutrition services referral as needed  Outcome: Progressing     Problem: MUSCULOSKELETAL - ADULT  Goal: Maintain or return mobility to safest level of function  Description: INTERVENTIONS:  - Assess patient's ability to carry out ADLs; assess patient's baseline for ADL function and identify physical deficits which impact ability to perform ADLs (bathing, care of mouth/teeth, toileting, grooming, dressing, etc )  - Assess/evaluate cause of self-care deficits   - Assess range of motion  - Assess patient's mobility  - Assess patient's need for assistive devices and provide as appropriate  - Encourage maximum independence but intervene and supervise when necessary  - Involve family in performance of ADLs  - Assess for home care needs following discharge   - Consider OT consult to assist with ADL evaluation and planning for discharge  - Provide patient education as appropriate  Outcome: Progressing  Goal: Maintain proper alignment of affected body part  Description: INTERVENTIONS:  - Support, maintain and protect limb and body alignment  - Provide patient/ family with appropriate education  Outcome: Progressing Problem: Nutrition/Hydration-ADULT  Goal: Nutrient/Hydration intake appropriate for improving, restoring or maintaining nutritional needs  Description: Monitor and assess patient's nutrition/hydration status for malnutrition  Collaborate with interdisciplinary team and initiate plan and interventions as ordered  Monitor patient's weight and dietary intake as ordered or per policy  Utilize nutrition screening tool and intervene as necessary  Determine patient's food preferences and provide high-protein, high-caloric foods as appropriate       INTERVENTIONS:  - Monitor oral intake, urinary output, labs, and treatment plans  - Assess nutrition and hydration status and recommend course of action  - Evaluate amount of meals eaten  - Assist patient with eating if necessary   - Allow adequate time for meals  - Recommend/ encourage appropriate diets, oral nutritional supplements, and vitamin/mineral supplements  - Order, calculate, and assess calorie counts as needed  - Recommend, monitor, and adjust tube feedings and TPN/PPN based on assessed needs  - Assess need for intravenous fluids  - Provide specific nutrition/hydration education as appropriate  - Include patient/family/caregiver in decisions related to nutrition  Outcome: Progressing

## 2021-03-31 NOTE — NURSING NOTE
Patient alert and oriented x4  Patient discharged to home  Patient left unit with RN  [facility] reviewed with [facility]  She verbalized understanding of instructions  No written prescriptions given

## 2021-03-31 NOTE — CASE MANAGEMENT
Discharge planning:     CM met with pt at bedside to discuss Discharge planning  ANNEL was notified at morning rounds that  Pt will be returning to her previous environment and will not require [facility] bed  ANNEL was notified that pt will need medication (meds to beds)  CM called [facility] pharmacy and was notified that Pt has a co payments of $78 25 which pt was not able to afford, Pt stated she has no money  ANNEL sent e-mail to hospital financial counselor and pt has 100% eligibility for Rx  ANNEL filled out indigent form, signed and faxed to Pharmacy for financial coverage of copayment  ANNEL requested pharmacy to deliver meds to bed  Pt received her medication  Pt will be discharging back home  Pt Transportation will be given by her father      CM department will continue to follow through pt's D/C

## 2021-04-03 NOTE — DISCHARGE SUMMARY
Discharge Summary Daniela Us 1965, [id number]        Admission Date: 3/23/2021  Discharge Date: 3/31/2021      Discharge Diagnosis:   1  Elevated anion gap metabolic acidosis secondary to starvation  2  Herpes simplex 2  3  Trichomoniasis  4  Bipolar affective disorder with depression  5  Type 2 diabetes  6  Hypothyroidism  7  Tobacco abuse  8  Hypertension  9  Obesity     Consulting Physicians:  1  Dr Jorge Martínez, psychiatry  2  Dr Maikel Arevalo, Obstetrics and Gynecology    Procedures Performed:   None    HPI:  The patient is a 16-year-old woman with a history of bipolar affective disorder who has had progressive worsening of her anxiety for several weeks  She came to the hospital because of suicidal ideation  She was planning to take pills  The patient had not been eating or drinking well for the past 5 days before admission  She was found to be acidotic and was admitted for further evaluation and care  Hospital Course: The patient was admitted to the hospital and monitored carefully  She was vigorously hydrated with intravenous fluid  With rehydration and resumption of oral intake, her acidosis resolved  The patient was having discomfort and burning in the genital area  She was evaluated by obstetric seen gynecology  She was found to have herpes simplex and also trichomoniasis  She was treated appropriately  The patient was severely depressed  She was evaluated by Psychiatry  She was restarted on medications  She did gradually improved  Initially, in patient care was recommended  Arrangements for this could not be made expeditiously  She was re-evaluated several days later and was found to have improved  In patient care was no longer thought necessary  The patient's other medical issues remained stable during this hospitalization  On the day of discharge she was feeling well  Vital signs were stable  Lungs were clear  Cardiac exam revealed regular rhythm    The abdomen was soft and nontender  There was no edema  Disposition:  The patient was discharged home on March 31st   She was asked to remain on a diabetic diet  Her activity will be as tolerated  She was asked to arrange follow-up with her primary care provider within 1 week  She will also be re-evaluated in 2 weeks by gynecology at the [facility]  She was given contact information for Psychiatry to arrange follow-up in that regard  Discharge instructions/Information to patient and family:   See after visit summary for information provided to patient and family  Provisions for Follow-Up Care:  See after visit summary for information related to follow-up care and any pertinent home health orders  Planned Readmission: No    Discharge Statement   I spent 40 minutes discharging the patient  This time was spent on the day of discharge  I had direct contact with the patient on the day of discharge  Discharge Medications:  See after visit summary for reconciled discharge medications provided to patient and family

## 2021-04-08 ENCOUNTER — OFFICE VISIT (OUTPATIENT)
Dept: OBGYN CLINIC | Facility: CLINIC | Age: 56
End: 2021-04-08

## 2021-04-08 VITALS
HEART RATE: 98 BPM | SYSTOLIC BLOOD PRESSURE: 188 MMHG | BODY MASS INDEX: 35.1 KG/M2 | DIASTOLIC BLOOD PRESSURE: 122 MMHG | WEIGHT: 195 LBS

## 2021-04-08 DIAGNOSIS — A64 STI (SEXUALLY TRANSMITTED INFECTION): Primary | ICD-10-CM

## 2021-04-08 PROCEDURE — 99203 OFFICE O/P NEW LOW 30 MIN: CPT | Performed by: OBSTETRICS & GYNECOLOGY

## 2021-04-08 RX ORDER — VALACYCLOVIR HYDROCHLORIDE 1 G/1
1000 TABLET, FILM COATED ORAL 2 TIMES DAILY
Qty: 20 TABLET | Refills: 0 | Status: SHIPPED | OUTPATIENT
Start: 2021-04-09 | End: 2021-09-03 | Stop reason: HOSPADM

## 2021-04-08 RX ORDER — VALACYCLOVIR HYDROCHLORIDE 1 G/1
1000 TABLET, FILM COATED ORAL DAILY
Qty: 30 TABLET | Refills: 0 | Status: SHIPPED | OUTPATIENT
Start: 2021-04-20 | End: 2021-09-03 | Stop reason: HOSPADM

## 2021-04-08 NOTE — PROGRESS NOTES
OB/GYN VISIT  Jeronimo Parra  4/8/2021  6:08 PM    Subjective:     Jeronimo Parra is a 50 y o  No obstetric history on file  female who presents for follow up visit after inpatient discharge on 3/31  Patient was seen by GYN consult service on 3/28 and was diagnosed with genital HSV, vulvovaginal candidiasis and trichomoniasis and was treated with Valtrex, Fluconazole and Metronidazole respectively  Today she is extremely anxious but reports marked improvement in symptoms of since she was discharged from the hospital  She believes there are 2 new herpetic lesions because she is feeling tingling and burning on her labia  She is no longer sexually active  Has not seen a gynecologist in "many many years"  Objective:  Vitals: Blood pressure (!) 188/122, pulse 98, weight 88 5 kg (195 lb), not currently breastfeeding  Body mass index is 35 1 kg/m²  Physical Exam  Genitourinary:         Comments: Localized erythema and excoriations in areas noted above  Assessment and Plan:  52yo female previously treated for vulvovaginal candidiasis, genital HSV and trichomoniasis  Possible new HSV lesions erupting today  Patient will be given another prescription for Valtrex 1g oral BID for 10 days to be followed by suppressive therapy of Valtrex 1g daily for a month  Follow up GYN appointment for routine care strongly encouraged today  Patient seen and examined with Dr Ju Alexandra MD  4/8/2021  6:08 PM

## 2021-05-21 NOTE — PLAN OF CARE
Problem: Alteration in Thoughts and Perception  Goal: Treatment Goal: Gain control of psychotic behaviors/thinking, reduce/eliminate presenting symptoms and demonstrate improved reality functioning upon discharge  Outcome: Adequate for Discharge     Problem: Depression  Goal: Treatment Goal: Demonstrate behavioral control of depressive symptoms, verbalize feelings of improved mood/affect, and adopt new coping skills prior to discharge  Outcome: Adequate for Discharge     Problem: Anxiety  Goal: Anxiety is at manageable level  Description: Interventions:  - Assess and monitor patient's anxiety level  - Monitor for signs and symptoms (heart palpitations, chest pain, shortness of breath, headaches, nausea, feeling jumpy, restlessness, irritable, apprehensive)  - Collaborate with interdisciplinary team and initiate plan and interventions as ordered    - [place] patient to unit/surroundings  - Explain treatment plan  - Encourage participation in care  - Encourage verbalization of concerns/fears  - Identify coping mechanisms  - Assist in developing anxiety-reducing skills  - Administer/offer alternative therapies  - Limit or eliminate stimulants  Outcome: Adequate for Discharge     Problem: Ineffective Coping  Goal: Participates in unit activities  Description: Interventions:  - Provide therapeutic environment   - Provide required programming   - Redirect inappropriate behaviors   Outcome: Adequate for Discharge     Problem: DISCHARGE PLANNING  Goal: Discharge to home or other facility with appropriate resources  Description: INTERVENTIONS:  - Identify barriers to discharge w/patient and caregiver  - Arrange for needed discharge resources and transportation as appropriate  - Identify discharge learning needs (meds, wound care, etc )  - Arrange for interpretive services to assist at discharge as needed  - Refer to Case Management Department for coordinating discharge planning if the patient needs post-hospital services Syphilis related vasculitis, now quiescent. based on physician/advanced practitioner order or complex needs related to functional status, cognitive ability, or social support system  Outcome: Adequate for Discharge

## 2021-09-02 ENCOUNTER — APPOINTMENT (EMERGENCY)
Dept: RADIOLOGY | Facility: HOSPITAL | Age: 56
End: 2021-09-02
Payer: COMMERCIAL

## 2021-09-02 ENCOUNTER — HOSPITAL ENCOUNTER (OUTPATIENT)
Facility: HOSPITAL | Age: 56
Setting detail: OBSERVATION
Discharge: HOME | End: 2021-09-03
Attending: EMERGENCY MEDICINE | Admitting: INTERNAL MEDICINE
Payer: COMMERCIAL

## 2021-09-02 DIAGNOSIS — R07.9 CHEST PAIN: ICD-10-CM

## 2021-09-02 DIAGNOSIS — I10 HYPERTENSION: Primary | ICD-10-CM

## 2021-09-02 DIAGNOSIS — E78.2 MIXED HYPERLIPIDEMIA: ICD-10-CM

## 2021-09-02 PROBLEM — R07.89 CHEST PRESSURE: Status: ACTIVE | Noted: 2021-09-02

## 2021-09-02 LAB
ALBUMIN SERPL BCP-MCNC: 3.8 G/DL (ref 3.5–5)
ALP SERPL-CCNC: 97 U/L (ref 46–116)
ALT SERPL W P-5'-P-CCNC: 25 U/L (ref 12–78)
ANION GAP SERPL CALCULATED.3IONS-SCNC: 10 MMOL/L (ref 4–13)
APTT PPP: 28 SECONDS (ref 23–37)
AST SERPL W P-5'-P-CCNC: 13 U/L (ref 5–45)
ATRIAL RATE: 69 BPM
ATRIAL RATE: 81 BPM
BASOPHILS # BLD AUTO: 0.03 THOUSANDS/ΜL (ref 0–0.1)
BASOPHILS NFR BLD AUTO: 0 % (ref 0–1)
BILIRUB SERPL-MCNC: 0.21 MG/DL (ref 0.2–1)
BUN SERPL-MCNC: 11 MG/DL (ref 5–25)
CALCIUM SERPL-MCNC: 9.2 MG/DL (ref 8.3–10.1)
CHLORIDE SERPL-SCNC: 100 MMOL/L (ref 100–108)
CHOLEST SERPL-MCNC: 247 MG/DL (ref 50–200)
CO2 SERPL-SCNC: 29 MMOL/L (ref 21–32)
CREAT SERPL-MCNC: 0.79 MG/DL (ref 0.6–1.3)
EOSINOPHIL # BLD AUTO: 0.09 THOUSAND/ΜL (ref 0–0.61)
EOSINOPHIL NFR BLD AUTO: 1 % (ref 0–6)
ERYTHROCYTE [DISTWIDTH] IN BLOOD BY AUTOMATED COUNT: 13 % (ref 11.6–15.1)
GFR SERPL CREATININE-BSD FRML MDRD: 86 ML/MIN/1.73SQ M
GLUCOSE SERPL-MCNC: 112 MG/DL (ref 65–140)
GLUCOSE SERPL-MCNC: 150 MG/DL (ref 65–140)
GLUCOSE SERPL-MCNC: 206 MG/DL (ref 65–140)
HCT VFR BLD AUTO: 41 % (ref 34.8–46.1)
HDLC SERPL-MCNC: 48 MG/DL
HGB BLD-MCNC: 13.5 G/DL (ref 11.5–15.4)
IMM GRANULOCYTES # BLD AUTO: 0.05 THOUSAND/UL (ref 0–0.2)
IMM GRANULOCYTES NFR BLD AUTO: 0 % (ref 0–2)
INR PPP: 0.84 (ref 0.84–1.19)
LDLC SERPL CALC-MCNC: 151 MG/DL (ref 0–100)
LYMPHOCYTES # BLD AUTO: 3.09 THOUSANDS/ΜL (ref 0.6–4.47)
LYMPHOCYTES NFR BLD AUTO: 28 % (ref 14–44)
MCH RBC QN AUTO: 30.4 PG (ref 26.8–34.3)
MCHC RBC AUTO-ENTMCNC: 32.9 G/DL (ref 31.4–37.4)
MCV RBC AUTO: 92 FL (ref 82–98)
MONOCYTES # BLD AUTO: 0.84 THOUSAND/ΜL (ref 0.17–1.22)
MONOCYTES NFR BLD AUTO: 8 % (ref 4–12)
NEUTROPHILS # BLD AUTO: 7.1 THOUSANDS/ΜL (ref 1.85–7.62)
NEUTS SEG NFR BLD AUTO: 63 % (ref 43–75)
NRBC BLD AUTO-RTO: 0 /100 WBCS
P AXIS: 59 DEGREES
P AXIS: 60 DEGREES
PLATELET # BLD AUTO: 389 THOUSANDS/UL (ref 149–390)
PMV BLD AUTO: 9.3 FL (ref 8.9–12.7)
POTASSIUM SERPL-SCNC: 3.6 MMOL/L (ref 3.5–5.3)
PR INTERVAL: 200 MS
PR INTERVAL: 226 MS
PROT SERPL-MCNC: 8 G/DL (ref 6.4–8.2)
PROTHROMBIN TIME: 11.4 SECONDS (ref 11.6–14.5)
QRS AXIS: 21 DEGREES
QRS AXIS: 65 DEGREES
QRSD INTERVAL: 70 MS
QRSD INTERVAL: 72 MS
QT INTERVAL: 392 MS
QT INTERVAL: 438 MS
QTC INTERVAL: 455 MS
QTC INTERVAL: 469 MS
RBC # BLD AUTO: 4.44 MILLION/UL (ref 3.81–5.12)
SODIUM SERPL-SCNC: 139 MMOL/L (ref 136–145)
T WAVE AXIS: 18 DEGREES
T WAVE AXIS: 37 DEGREES
TRIGL SERPL-MCNC: 238 MG/DL
TROPONIN I SERPL-MCNC: <0.02 NG/ML
VENTRICULAR RATE: 69 BPM
VENTRICULAR RATE: 81 BPM
WBC # BLD AUTO: 11.2 THOUSAND/UL (ref 4.31–10.16)

## 2021-09-02 PROCEDURE — 82948 REAGENT STRIP/BLOOD GLUCOSE: CPT

## 2021-09-02 PROCEDURE — 93005 ELECTROCARDIOGRAM TRACING: CPT

## 2021-09-02 PROCEDURE — 96374 THER/PROPH/DIAG INJ IV PUSH: CPT

## 2021-09-02 PROCEDURE — 93010 ELECTROCARDIOGRAM REPORT: CPT | Performed by: INTERNAL MEDICINE

## 2021-09-02 PROCEDURE — 80053 COMPREHEN METABOLIC PANEL: CPT | Performed by: EMERGENCY MEDICINE

## 2021-09-02 PROCEDURE — 85610 PROTHROMBIN TIME: CPT | Performed by: EMERGENCY MEDICINE

## 2021-09-02 PROCEDURE — 99284 EMERGENCY DEPT VISIT MOD MDM: CPT | Performed by: EMERGENCY MEDICINE

## 2021-09-02 PROCEDURE — 80061 LIPID PANEL: CPT | Performed by: PHYSICIAN ASSISTANT

## 2021-09-02 PROCEDURE — 99220 PR INITIAL OBSERVATION CARE/DAY 70 MINUTES: CPT | Performed by: INTERNAL MEDICINE

## 2021-09-02 PROCEDURE — 85025 COMPLETE CBC W/AUTO DIFF WBC: CPT | Performed by: EMERGENCY MEDICINE

## 2021-09-02 PROCEDURE — 85730 THROMBOPLASTIN TIME PARTIAL: CPT | Performed by: EMERGENCY MEDICINE

## 2021-09-02 PROCEDURE — 99285 EMERGENCY DEPT VISIT HI MDM: CPT

## 2021-09-02 PROCEDURE — 84484 ASSAY OF TROPONIN QUANT: CPT | Performed by: PHYSICIAN ASSISTANT

## 2021-09-02 PROCEDURE — 84484 ASSAY OF TROPONIN QUANT: CPT | Performed by: EMERGENCY MEDICINE

## 2021-09-02 PROCEDURE — 36415 COLL VENOUS BLD VENIPUNCTURE: CPT | Performed by: EMERGENCY MEDICINE

## 2021-09-02 PROCEDURE — 71045 X-RAY EXAM CHEST 1 VIEW: CPT

## 2021-09-02 RX ORDER — AMLODIPINE BESYLATE 5 MG/1
5 TABLET ORAL ONCE
Status: COMPLETED | OUTPATIENT
Start: 2021-09-02 | End: 2021-09-02

## 2021-09-02 RX ORDER — METOPROLOL TARTRATE 50 MG/1
50 TABLET, FILM COATED ORAL EVERY 12 HOURS SCHEDULED
Status: DISCONTINUED | OUTPATIENT
Start: 2021-09-02 | End: 2021-09-03 | Stop reason: HOSPADM

## 2021-09-02 RX ORDER — HYDROXYZINE HYDROCHLORIDE 25 MG/1
50 TABLET, FILM COATED ORAL 2 TIMES DAILY PRN
Status: DISCONTINUED | OUTPATIENT
Start: 2021-09-02 | End: 2021-09-03 | Stop reason: HOSPADM

## 2021-09-02 RX ORDER — DOCUSATE SODIUM 100 MG/1
100 CAPSULE, LIQUID FILLED ORAL 2 TIMES DAILY
Status: DISCONTINUED | OUTPATIENT
Start: 2021-09-02 | End: 2021-09-03 | Stop reason: HOSPADM

## 2021-09-02 RX ORDER — DULOXETIN HYDROCHLORIDE 30 MG/1
30 CAPSULE, DELAYED RELEASE ORAL DAILY
Status: DISCONTINUED | OUTPATIENT
Start: 2021-09-03 | End: 2021-09-03 | Stop reason: HOSPADM

## 2021-09-02 RX ORDER — ASPIRIN 81 MG/1
324 TABLET, CHEWABLE ORAL ONCE
Status: COMPLETED | OUTPATIENT
Start: 2021-09-02 | End: 2021-09-02

## 2021-09-02 RX ORDER — ARIPIPRAZOLE 5 MG/1
5 TABLET ORAL DAILY
Status: DISCONTINUED | OUTPATIENT
Start: 2021-09-03 | End: 2021-09-03 | Stop reason: HOSPADM

## 2021-09-02 RX ORDER — TRAZODONE HYDROCHLORIDE 100 MG/1
200 TABLET ORAL
Status: DISCONTINUED | OUTPATIENT
Start: 2021-09-02 | End: 2021-09-03 | Stop reason: HOSPADM

## 2021-09-02 RX ORDER — CLONAZEPAM 0.5 MG/1
0.5 TABLET ORAL 2 TIMES DAILY
COMMUNITY
End: 2022-01-06 | Stop reason: SDUPTHER

## 2021-09-02 RX ORDER — CLONIDINE HYDROCHLORIDE 0.1 MG/1
0.1 TABLET ORAL
Status: DISCONTINUED | OUTPATIENT
Start: 2021-09-02 | End: 2021-09-03 | Stop reason: HOSPADM

## 2021-09-02 RX ORDER — NITROGLYCERIN 0.4 MG/1
0.4 TABLET SUBLINGUAL ONCE
Status: COMPLETED | OUTPATIENT
Start: 2021-09-02 | End: 2021-09-02

## 2021-09-02 RX ORDER — AMLODIPINE BESYLATE 5 MG/1
5 TABLET ORAL DAILY
Status: DISCONTINUED | OUTPATIENT
Start: 2021-09-03 | End: 2021-09-03 | Stop reason: HOSPADM

## 2021-09-02 RX ORDER — CLONAZEPAM 0.5 MG/1
0.5 TABLET ORAL 2 TIMES DAILY
Status: DISCONTINUED | OUTPATIENT
Start: 2021-09-02 | End: 2021-09-03 | Stop reason: HOSPADM

## 2021-09-02 RX ORDER — ACETAMINOPHEN 325 MG/1
650 TABLET ORAL EVERY 6 HOURS PRN
Status: DISCONTINUED | OUTPATIENT
Start: 2021-09-02 | End: 2021-09-03 | Stop reason: HOSPADM

## 2021-09-02 RX ORDER — LEVOTHYROXINE SODIUM 0.03 MG/1
25 TABLET ORAL
Status: DISCONTINUED | OUTPATIENT
Start: 2021-09-03 | End: 2021-09-03 | Stop reason: HOSPADM

## 2021-09-02 RX ORDER — ONDANSETRON 2 MG/ML
4 INJECTION INTRAMUSCULAR; INTRAVENOUS EVERY 6 HOURS PRN
Status: DISCONTINUED | OUTPATIENT
Start: 2021-09-02 | End: 2021-09-03 | Stop reason: HOSPADM

## 2021-09-02 RX ORDER — LABETALOL 20 MG/4 ML (5 MG/ML) INTRAVENOUS SYRINGE
20 ONCE
Status: COMPLETED | OUTPATIENT
Start: 2021-09-02 | End: 2021-09-02

## 2021-09-02 RX ORDER — MAGNESIUM HYDROXIDE/ALUMINUM HYDROXICE/SIMETHICONE 120; 1200; 1200 MG/30ML; MG/30ML; MG/30ML
30 SUSPENSION ORAL EVERY 6 HOURS PRN
Status: DISCONTINUED | OUTPATIENT
Start: 2021-09-02 | End: 2021-09-03 | Stop reason: HOSPADM

## 2021-09-02 RX ORDER — GABAPENTIN 400 MG/1
800 CAPSULE ORAL 3 TIMES DAILY
Status: DISCONTINUED | OUTPATIENT
Start: 2021-09-02 | End: 2021-09-03 | Stop reason: HOSPADM

## 2021-09-02 RX ORDER — LABETALOL 20 MG/4 ML (5 MG/ML) INTRAVENOUS SYRINGE
10 ONCE
Status: COMPLETED | OUTPATIENT
Start: 2021-09-02 | End: 2021-09-02

## 2021-09-02 RX ADMIN — GABAPENTIN 800 MG: 400 CAPSULE ORAL at 21:42

## 2021-09-02 RX ADMIN — ACETAMINOPHEN 650 MG: 325 TABLET, FILM COATED ORAL at 18:40

## 2021-09-02 RX ADMIN — CLONIDINE HYDROCHLORIDE 0.1 MG: 0.1 TABLET ORAL at 21:42

## 2021-09-02 RX ADMIN — AMLODIPINE BESYLATE 5 MG: 5 TABLET ORAL at 18:37

## 2021-09-02 RX ADMIN — ASPIRIN 324 MG: 81 TABLET, CHEWABLE ORAL at 16:17

## 2021-09-02 RX ADMIN — LABETALOL 20 MG/4 ML (5 MG/ML) INTRAVENOUS SYRINGE 20 MG: at 15:12

## 2021-09-02 RX ADMIN — LABETALOL 20 MG/4 ML (5 MG/ML) INTRAVENOUS SYRINGE 10 MG: at 16:18

## 2021-09-02 RX ADMIN — METOPROLOL TARTRATE 50 MG: 50 TABLET, FILM COATED ORAL at 21:42

## 2021-09-02 RX ADMIN — CLONAZEPAM 0.5 MG: 0.5 TABLET ORAL at 18:37

## 2021-09-02 RX ADMIN — INSULIN LISPRO 2 UNITS: 100 INJECTION, SOLUTION INTRAVENOUS; SUBCUTANEOUS at 21:44

## 2021-09-02 RX ADMIN — TRAZODONE HYDROCHLORIDE 200 MG: 100 TABLET ORAL at 21:42

## 2021-09-02 RX ADMIN — NITROGLYCERIN 0.4 MG: 0.4 TABLET SUBLINGUAL at 16:17

## 2021-09-02 NOTE — ASSESSMENT & PLAN NOTE
Lab Results   Component Value Date    HGBA1C 8 6 (H) 07/01/2021       No results for input(s): POCGLU in the last 72 hours      Blood Sugar Average: Last 72 hrs:  ·  patient maintained on metformin 500 b i d , will hold while inpatient  · Sliding scale insulin a c  HS  · Hypoglycemia protocol  · Diabetic diet

## 2021-09-02 NOTE — PLAN OF CARE
Problem: PAIN - ADULT  Goal: Verbalizes/displays adequate comfort level or baseline comfort level  Description: Interventions:  - Encourage patient to monitor pain and request assistance  - Assess pain using appropriate pain scale  - Administer analgesics based on type and severity of pain and evaluate response  - Implement non-pharmacological measures as appropriate and evaluate response  - Consider cultural and social influences on pain and pain management  - Notify physician/advanced practitioner if interventions unsuccessful or patient reports new pain  Outcome: Progressing     Problem: INFECTION - ADULT  Goal: Absence or prevention of progression during hospitalization  Description: INTERVENTIONS:  - Assess and monitor for signs and symptoms of infection  - Monitor lab/diagnostic results  - Monitor all insertion sites, i e  indwelling lines, tubes, and drains  - Monitor endotracheal if appropriate and nasal secretions for changes in amount and color  - [place] appropriate cooling/warming therapies per order  - Administer medications as ordered  - Instruct and encourage patient and family to use good hand hygiene technique  - Identify and instruct in appropriate isolation precautions for identified infection/condition  Outcome: Progressing  Goal: Absence of fever/infection during neutropenic period  Description: INTERVENTIONS:  - Monitor WBC    Outcome: Progressing     Problem: SAFETY ADULT  Goal: Patient will remain free of falls  Description: INTERVENTIONS:  - Educate patient/family on patient safety including physical limitations  - Instruct patient to call for assistance with activity   - Consult OT/PT to assist with strengthening/mobility   - Keep Call bell within reach  - Keep bed low and locked with side rails adjusted as appropriate  - Keep care items and personal belongings within reach  - Initiate and maintain comfort rounds  - Make Fall Risk Sign visible to staff  - Offer Toileting every  Hours, in advance of need  - Initiate/Maintain alarm  - Obtain necessary fall risk management equipment:   - Apply yellow socks and bracelet for high fall risk patients  - Consider moving patient to room near nurses station  Outcome: Progressing  Goal: Maintain or return to baseline ADL function  Description: INTERVENTIONS:  -  Assess patient's ability to carry out ADLs; assess patient's baseline for ADL function and identify physical deficits which impact ability to perform ADLs (bathing, care of mouth/teeth, toileting, grooming, dressing, etc )  - Assess/evaluate cause of self-care deficits   - Assess range of motion  - Assess patient's mobility; develop plan if impaired  - Assess patient's need for assistive devices and provide as appropriate  - Encourage maximum independence but intervene and supervise when necessary  - Involve family in performance of ADLs  - Assess for home care needs following discharge   - Consider OT consult to assist with ADL evaluation and planning for discharge  - Provide patient education as appropriate  Outcome: Progressing  Goal: Maintains/Returns to pre admission functional level  Description: INTERVENTIONS:  - Perform BMAT or MOVE assessment daily    - Set and communicate daily mobility goal to care team and patient/family/caregiver  - Collaborate with rehabilitation services on mobility goals if consulted  - Perform Range of Motion  times a day  - Reposition patient every  hours    - Dangle patient  times a day  - Stand patient  times a day  - Ambulate patient  times a day  - Out of bed to chair  times a day   - Out of bed for meals times a day  - Out of bed for toileting  - Record patient progress and toleration of activity level   Outcome: Progressing     Problem: DISCHARGE PLANNING  Goal: Discharge to home or other facility with appropriate resources  Description: INTERVENTIONS:  - Identify barriers to discharge w/patient and caregiver  - Arrange for needed discharge resources and transportation as appropriate  - Identify discharge learning needs (meds, wound care, etc )  - Arrange for interpretive services to assist at discharge as needed  - Refer to Case Management Department for coordinating discharge planning if the patient needs post-hospital services based on physician/advanced practitioner order or complex needs related to functional status, cognitive ability, or social support system  Outcome: Progressing     Problem: Knowledge Deficit  Goal: Patient/family/caregiver demonstrates understanding of disease process, treatment plan, medications, and discharge instructions  Description: Complete learning assessment and assess knowledge base    Interventions:  - Provide teaching at level of understanding  - Provide teaching via preferred learning methods  Outcome: Progressing     Problem: SUBSTANCE USE/ABUSE  Goal: By discharge, will develop insight into their chemical dependency and sustain motivation to continue in recovery  Description: INTERVENTIONS:  - Attends all daily group sessions and scheduled AA groups  - Actively practices coping skills through participation in the therapeutic community and adherence to program rules  - Reviews and completes assignments from individual treatment plan  - Assist patient development of understanding of their personal cycle of addiction and relapse triggers  Outcome: Progressing  Goal: By discharge, patient will have ongoing treatment plan addressing chemical dependency  Description: INTERVENTIONS:  - Assist patient with resources and/or appointments for ongoing recovery based living  Outcome: Progressing

## 2021-09-02 NOTE — ED NOTES
Before labetalol: 197/93  During admin of labetalol: 178/89  After admin of labetalol: 155/81     Rosa Cano RN  09/02/21 [id number]

## 2021-09-02 NOTE — H&P
[facility]  H&P- Effie Na 1965, 50 y o  female MRN: [id number]  Unit/Bed#: ED 17 Encounter: [id number]  Primary Care Provider: Marylee Callow, CRNP   Date and time admitted to hospital: 9/2/2021  1:55 PM    * Chest pressure  Assessment & Plan  Chest discomfort and elevated home blood pressure readings x1 week  · Troponin x1 negative, will trend with serial EKGs  · EKG performed in the emergency department with T-wave inversion in lead 3, however no significant change from previous EKG performed in March of 2021  · Monitor on telemetry  · ELFEGO score 1  · Suspect chest discomfort likely secondary to accelerated hypertension however will rule out ACS    Essential hypertension  Assessment & Plan  With accelerated hypertension on arrival to ED, 235/116  · Now resolved with IV labetalol  · Maintained on clonidine and metoprolol as an outpatient, continue  · Monitor blood pressure per unit routine  · Will add Amlodipine 5 mg q d  · Hydralazine p r n  Tobacco abuse  Assessment & Plan  · Reportedly smokes half pack per day  · Nicotine patch ordered  · Counseled on the importance of smoking cessation    Hyperlipemia  Assessment & Plan  · Not maintained on medication, will repeat    Type 2 diabetes mellitus without complication, without long-term current use of insulin (HCC)  Assessment & Plan  Lab Results   Component Value Date    HGBA1C 8 6 (H) 07/01/2021       No results for input(s): POCGLU in the last 72 hours      Blood Sugar Average: Last 72 hrs:  ·  patient maintained on metformin 500 b i d , will hold while inpatient  · Sliding scale insulin a c  HS  · Hypoglycemia protocol  · Diabetic diet    Generalized anxiety disorder  Assessment & Plan  · Continue Atarax b i d  P r n , Cymbalta 30 mg q d , Abilify 5 mg, trazodone 200 HS      VTE Prophylaxis: Enoxaparin (Lovenox)  / sequential compression device   Code Status:  Full code  POLST: There is no POLST form on file for this patient (pre-hospital)  Discussion with family:  Discussed plan of care with patient, answered any questions  Anticipated Length of Stay:  Patient will be admitted on an Observation basis with an anticipated length of stay of  less than 2 midnights  Justification for Hospital Stay:  ACS rule out    Total Time for Visit, including Counseling / Coordination of Care: 60 minutes  Greater than 50% of this total time spent on direct patient counseling and coordination of care  Chief Complaint:   Chest discomfort/hypertension    History of Present Illness:    Becky Holman is a 50 y o  female with past medical history of anxiety disorder, bipolar disorder, hypertension, hyperlipidemia, non-insulin-dependent type 2 diabetes who presents with approximately 1 week of high blood pressure readings at home and associated chest discomfort  According to the patient, she states that her PCP recommended that she purchase a blood pressure machine in order to monitor them at home  She states that over the last week they have been significantly elevated in the high 190s-200s  She additionally associates this with increased chest pressure/discomfort  Denies any specific pain  States that her chest discomfort/hypertension is provoked with stressful thoughts and seems to be alleviated with her antianxiety medications  She denies any chest pain/radiation to left shoulder/jaw  Denies any abdominal pain, nausea/vomiting, associated diaphoresis  She does associate her chest discomfort and high blood pressure readings with mild headache  Denies any vision changes  Denies any illicit drug use, states that she does smoke half a pack per day of cigarettes, denies any alcohol use  Reports taking all of her medications as prescribed  Review of Systems:    Review of Systems   Constitutional: Negative for chills, diaphoresis, fatigue and fever  HENT: Negative for ear pain and sore throat      Eyes: Negative for photophobia, pain and visual disturbance  Respiratory: Negative for cough, shortness of breath and wheezing  Cardiovascular: Negative for chest pain and palpitations  Chest pressure/discomfort   Gastrointestinal: Negative for abdominal pain, constipation, diarrhea, nausea and vomiting  Genitourinary: Negative for dysuria, frequency, hematuria and urgency  Musculoskeletal: Negative for arthralgias and back pain  Skin: Negative for color change and rash  Neurological: Positive for headaches  Negative for dizziness, tremors, seizures, syncope, facial asymmetry, speech difficulty, weakness, light-headedness and numbness  Past Medical and Surgical History:     Past Medical History:   Diagnosis Date    Addiction to drug ([place] [address] )     Adjustment disorder     Alcohol abuse     Alcoholism ([place] [address] )     Anxiety     Bipolar disorder (HCC)     Bowel obstruction ([place] [address] )     Depression     Diabetes type 2, controlled ([place] [address] )     Disease of thyroid gland     Gastritis     Head injury     Heart palpitations     Hyperlipidemia     Hypertension     Hypothyroidism     Psychiatric illness     PTSD (post-traumatic stress disorder)     Seizure ([place] [address] )     Seizures ([place] [address] )     Sleep difficulties     Substance abuse ([place] [address] )     Suicide attempt ([place] [address] )     Withdrawal symptoms, drug or narcotic ([place] [address] )        Past Surgical History:   Procedure Laterality Date    ABDOMINAL SURGERY      APPENDECTOMY      BOWEL RESECTION      CHOLECYSTECTOMY      ESOPHAGOGASTRODUODENOSCOPY N/A 5/18/2018    Procedure: ESOPHAGOGASTRODUODENOSCOPY (EGD) with bx;  Surgeon: Olvin Gr DO;  Location: [facility]; Service: Gastroenterology    HYSTERECTOMY      KNEE SURGERY Bilateral        Meds/Allergies:    Prior to Admission medications    Medication Sig Start Date End Date Taking?  Authorizing Provider   ARIPiprazole (ABILIFY) 5 mg tablet Take 1 tablet (5 mg total) by mouth daily 3/31/21  Yes Jimmy Durham MD   clonazePAM (KlonoPIN) 0 5 mg tablet Take 0 5 mg by mouth 2 (two) times a day   Yes Historical Provider, MD   cloNIDine (CATAPRES) 0 1 mg tablet Take 1 tablet (0 1 mg total) by mouth daily at bedtime 10/12/20  Yes Maribel Bates PA-C   DULoxetine (CYMBALTA) 30 mg delayed release capsule Take 30 mg by mouth daily   Yes Historical Provider, MD   folic acid (FOLVITE) 1 mg tablet Take by mouth daily   Yes Historical Provider, MD   gabapentin (NEURONTIN) 400 mg capsule Take 2 capsules (800 mg total) by mouth 3 (three) times a day 3/31/21  Yes Omayra Isaac MD   hydrOXYzine HCL (ATARAX) 50 mg tablet Take 1 tablet (50 mg total) by mouth 2 (two) times a day as needed for anxiety 3/31/21  Yes Omayra Isaac MD   levothyroxine 25 mcg tablet Take 1 tablet (25 mcg total) by mouth daily in the early morning 10/13/20  Yes Maribel Bates PA-C   metFORMIN (GLUCOPHAGE) 500 mg tablet Take 1 tablet (500 mg total) by mouth 2 (two) times a day with meals 3/31/21  Yes Omayra Isaac MD   metoprolol tartrate (LOPRESSOR) 50 mg tablet Take 1 tablet (50 mg total) by mouth every 12 (twelve) hours 3/31/21  Yes Omayra Isaac MD   traZODone (DESYREL) 100 mg tablet Take 200 mg by mouth daily at bedtime   Yes Historical Provider, MD   valACYclovir (VALTREX) 1,000 mg tablet Take 1 tablet (1,000 mg total) by mouth 2 (two) times a day for 10 days 4/9/21 4/19/21  Tamar Regan MD   valACYclovir (VALTREX) 1,000 mg tablet Take 1 tablet (1,000 mg total) by mouth daily 4/20/21 5/20/21  Tamar Regan MD     I have reviewed home medications with patient personally  Allergies:    Allergies   Allergen Reactions    Latex Rash       Social History:     Marital Status:    Occupation:  Unknown  Patient Pre-hospital Living Situation:  Unknown  Patient Pre-hospital Level of Mobility:  Ambulatory  Patient Pre-hospital Diet Restrictions:  None  Substance Use History:   Social History     Substance and Sexual Activity   Alcohol Use Not Currently    Alcohol/week: 6 0 standard drinks    Types: 6 Cans of beer per week    Comment: last drink on 08/15/20     Social History     Tobacco Use   Smoking Status Current Every Day Smoker    Packs/day: 0 50    Years: 25 00    Pack years: 12 50    Types: Cigarettes   Smokeless Tobacco Never Used     Social History     Substance and Sexual Activity   Drug Use No       Family History:    Family History   Problem Relation Age of Onset    Diabetes Father     Bipolar disorder Mother        Physical Exam:     Vitals:   Blood Pressure: (!) 185/98 (09/02/21 1600)  Pulse: 86 (09/02/21 1600)  Temperature: 98 5 °F (36 9 °C) (09/02/21 1348)  Temp Source: Oral (09/02/21 1348)  Respirations: 16 (09/02/21 1600)  Weight - Scale: 88 kg (194 lb 0 1 oz) (09/02/21 1348)  SpO2: 97 % (09/02/21 1600)    Physical Exam  Vitals and nursing note reviewed  Constitutional:       General: She is not in acute distress  Appearance: She is well-developed  She is obese  She is not ill-appearing, toxic-appearing or diaphoretic  HENT:      Head: Normocephalic and atraumatic  Eyes:      General: No scleral icterus  Conjunctiva/sclera: Conjunctivae normal    Cardiovascular:      Rate and Rhythm: Normal rate and regular rhythm  Heart sounds: No murmur heard  No friction rub  No gallop  Pulmonary:      Effort: Pulmonary effort is normal  No respiratory distress  Breath sounds: Normal breath sounds  No stridor  No wheezing, rhonchi or rales  Chest:      Chest wall: No tenderness  Abdominal:      General: Abdomen is flat  Bowel sounds are normal       Palpations: Abdomen is soft  Tenderness: There is no abdominal tenderness  Musculoskeletal:      Cervical back: Neck supple  Right lower leg: No edema  Left lower leg: No edema  Skin:     General: Skin is warm and dry  Capillary Refill: Capillary refill takes less than 2 seconds  Coloration: Skin is not jaundiced or pale  Findings: No erythema     Neurological: General: No focal deficit present  Mental Status: She is alert and oriented to person, place, and time  Mental status is at baseline  Additional Data:     Lab Results: I have personally reviewed pertinent reports  Results from last 7 days   Lab Units 09/02/21  1512   WBC Thousand/uL 11 20*   HEMOGLOBIN g/dL 13 5   HEMATOCRIT % 41 0   PLATELETS Thousands/uL 389   NEUTROS PCT % 63   LYMPHS PCT % 28   MONOS PCT % 8   EOS PCT % 1     Results from last 7 days   Lab Units 09/02/21  1512   SODIUM mmol/L 139   POTASSIUM mmol/L 3 6   CHLORIDE mmol/L 100   CO2 mmol/L 29   BUN mg/dL 11   CREATININE mg/dL 0 79   ANION GAP mmol/L 10   CALCIUM mg/dL 9 2   ALBUMIN g/dL 3 8   TOTAL BILIRUBIN mg/dL 0 21   ALK PHOS U/L 97   ALT U/L 25   AST U/L 13   GLUCOSE RANDOM mg/dL 150*     Results from last 7 days   Lab Units 09/02/21  1512   INR  0 84                   Imaging: I have personally reviewed pertinent reports  XR chest 1 view portable    (Results Pending)       EKG, Pathology, and Other Studies Reviewed on Admission:   · EKG:  Normal sinus rhythm, no acute ischemic changes, no changes from EKG performed in March of 2021    [employer] / [employer] Records Reviewed: Yes     ** Please Note: This note has been constructed using a voice recognition system   **

## 2021-09-02 NOTE — ASSESSMENT & PLAN NOTE
· Reportedly smokes half pack per day  · Nicotine patch ordered  · Counseled on the importance of smoking cessation

## 2021-09-02 NOTE — ASSESSMENT & PLAN NOTE
Chest discomfort and elevated home blood pressure readings x1 week  · Troponin x1 negative, will trend with serial EKGs  · EKG performed in the emergency department with T-wave inversion in lead 3, however no significant change from previous EKG performed in March of 2021  · Monitor on telemetry  · ELFEGO score 1  · Suspect chest discomfort likely secondary to accelerated hypertension however will rule out ACS

## 2021-09-02 NOTE — ED PROVIDER NOTES
History  Chief Complaint   Patient presents with    Hypertension     c/o hypertension with chest discomfort x 1 week  also c/o headache      61-year-old female with chest discomfort and headache for 1 week  She noticed her blood pressure was elevated  She is taking her blood pressure meds as directed          Prior to Admission Medications   Prescriptions Last Dose Informant Patient Reported? Taking?    ARIPiprazole (ABILIFY) 5 mg tablet   No Yes   Sig: Take 1 tablet (5 mg total) by mouth daily   DULoxetine (CYMBALTA) 30 mg delayed release capsule   Yes Yes   Sig: Take 30 mg by mouth daily   cloNIDine (CATAPRES) 0 1 mg tablet   No Yes   Sig: Take 1 tablet (0 1 mg total) by mouth daily at bedtime   clonazePAM (KlonoPIN) 0 5 mg tablet   Yes Yes   Sig: Take 0 5 mg by mouth 2 (two) times a day   folic acid (FOLVITE) 1 mg tablet   Yes Yes   Sig: Take by mouth daily   gabapentin (NEURONTIN) 400 mg capsule   No Yes   Sig: Take 2 capsules (800 mg total) by mouth 3 (three) times a day   hydrOXYzine HCL (ATARAX) 50 mg tablet   No Yes   Sig: Take 1 tablet (50 mg total) by mouth 2 (two) times a day as needed for anxiety   levothyroxine 25 mcg tablet   No Yes   Sig: Take 1 tablet (25 mcg total) by mouth daily in the early morning   metFORMIN (GLUCOPHAGE) 500 mg tablet   No Yes   Sig: Take 1 tablet (500 mg total) by mouth 2 (two) times a day with meals   metoprolol tartrate (LOPRESSOR) 50 mg tablet   No Yes   Sig: Take 1 tablet (50 mg total) by mouth every 12 (twelve) hours   traZODone (DESYREL) 100 mg tablet  Pharmacy (Specify) Yes Yes   Sig: Take 200 mg by mouth daily at bedtime   valACYclovir (VALTREX) 1,000 mg tablet   No No   Sig: Take 1 tablet (1,000 mg total) by mouth 2 (two) times a day for 10 days   valACYclovir (VALTREX) 1,000 mg tablet   No No   Sig: Take 1 tablet (1,000 mg total) by mouth daily      Facility-Administered Medications: None       Past Medical History:   Diagnosis Date    [facility]     Adjustment disorder     Alcohol abuse     Alcoholism ([place] [address] )     Anxiety     Bipolar disorder (HCC)     Bowel obstruction ([place] [address] )     Depression     Diabetes type 2, controlled ([place] [address] )     Disease of thyroid gland     Gastritis     Head injury     Heart palpitations     Hyperlipidemia     Hypertension     Hypothyroidism     Psychiatric illness     PTSD (post-traumatic stress disorder)     Seizure ([place] [address] )     Seizures ([place] [address] )     Sleep difficulties     Substance abuse ([place] [address] )     Suicide attempt ([place] [address] )     Withdrawal symptoms, drug or narcotic ([place] [address] )        Past Surgical History:   Procedure Laterality Date    ABDOMINAL SURGERY      APPENDECTOMY      BOWEL RESECTION      CHOLECYSTECTOMY      ESOPHAGOGASTRODUODENOSCOPY N/A 5/18/2018    Procedure: ESOPHAGOGASTRODUODENOSCOPY (EGD) with bx;  Surgeon: Gypsy Wilkerson DO;  Location: [facility]; Service: Gastroenterology    HYSTERECTOMY      KNEE SURGERY Bilateral        Family History   Problem Relation Age of Onset    Diabetes Father     Bipolar disorder Mother      I have reviewed and agree with the history as documented  E-Cigarette/Vaping    E-Cigarette Use Never User      E-Cigarette/Vaping Substances    Nicotine No     THC No     CBD No     Flavoring No     Other No     Unknown No      Social History     Tobacco Use    Smoking status: Current Every Day Smoker     Packs/day: 0 50     Years: 25 00     Pack years: 12 50     Types: Cigarettes    Smokeless tobacco: Never Used   Vaping Use    Vaping Use: Never used   Substance Use Topics    Alcohol use: Not Currently     Alcohol/week: 6 0 standard drinks     Types: 6 Cans of beer per week     Comment: last drink on 08/15/20    Drug use: No       Review of Systems   Constitutional: Negative for appetite change, fatigue and fever  HENT: Negative for rhinorrhea and sore throat  Eyes: Negative for pain  Respiratory: Negative for cough, shortness of breath and wheezing      Cardiovascular: Positive for chest pain  Negative for leg swelling  Gastrointestinal: Negative for abdominal pain, diarrhea and vomiting  Genitourinary: Negative for dysuria and flank pain  Musculoskeletal: Negative for back pain and neck pain  Skin: Negative for rash  Neurological: Positive for headaches  Negative for syncope  Psychiatric/Behavioral:        Mood normal       Physical Exam  Physical Exam  Vitals and nursing note reviewed  Constitutional:       Appearance: She is well-developed  HENT:      Head: Normocephalic and atraumatic  Eyes:      Pupils: Pupils are equal, round, and reactive to light  Cardiovascular:      Rate and Rhythm: Normal rate and regular rhythm  Pulmonary:      Effort: Pulmonary effort is normal       Breath sounds: Normal breath sounds  Abdominal:      Palpations: Abdomen is soft  Tenderness: There is no abdominal tenderness  Musculoskeletal:         General: Normal range of motion  Cervical back: Normal range of motion and neck supple  Skin:     General: Skin is warm and dry  Neurological:      Mental Status: She is alert and oriented to person, place, and time           Vital Signs  ED Triage Vitals [09/02/21 1348]   Temperature Pulse Respirations Blood Pressure SpO2   98 5 °F (36 9 °C) 85 16 (!) 235/116 99 %      Temp Source Heart Rate Source Patient Position - Orthostatic VS BP Location FiO2 (%)   Oral Monitor Sitting Left arm --      Pain Score       8           Vitals:    09/02/21 2142 09/02/21 2312 09/03/21 0256 09/03/21 0806   BP: 169/78 153/80 134/69 112/69   Pulse: 74 68 71 87   Patient Position - Orthostatic VS:  Lying Lying Lying         Visual Acuity      ED Medications  Medications   Labetalol HCl (NORMODYNE) injection 20 mg (20 mg Intravenous Given 9/2/21 1512)   Labetalol HCl (NORMODYNE) injection 10 mg (10 mg Intravenous Given 9/2/21 1618)   aspirin chewable tablet 324 mg (324 mg Oral Given 9/2/21 1617)   nitroglycerin (NITROSTAT) SL tablet 0 4 mg (0 4 mg Sublingual Given 9/2/21 1617)   amLODIPine (NORVASC) tablet 5 mg (5 mg Oral Given 9/2/21 1837)   diphenhydrAMINE (BENADRYL) tablet 25 mg (25 mg Oral Given 9/3/21 1125)       Diagnostic Studies  Results Reviewed     Procedure Component Value Units Date/Time    Lipid Panel with Direct LDL reflex [[id number]]  (Abnormal) Collected: 09/02/21 1512    Lab Status: Final result Specimen: Blood from Arm, Right Updated: 09/02/21 1738     Cholesterol 247 mg/dL      Triglycerides 238 mg/dL      HDL, Direct 48 mg/dL      LDL Calculated 151 mg/dL     Comprehensive metabolic panel [[id number]]  (Abnormal) Collected: 09/02/21 1512    Lab Status: Final result Specimen: Blood from Arm, Right Updated: 09/02/21 1556     Sodium 139 mmol/L      Potassium 3 6 mmol/L      Chloride 100 mmol/L      CO2 29 mmol/L      ANION GAP 10 mmol/L      BUN 11 mg/dL      Creatinine 0 79 mg/dL      Glucose 150 mg/dL      Calcium 9 2 mg/dL      AST 13 U/L      ALT 25 U/L      Alkaline Phosphatase 97 U/L      Total Protein 8 0 g/dL      Albumin 3 8 g/dL      Total Bilirubin 0 21 mg/dL      eGFR 86 ml/min/1 73sq m     Narrative:      [place] guidelines for Chronic Kidney Disease (CKD):     Stage 1 with normal or high GFR (GFR > 90 mL/min/1 73 square meters)    Stage 2 Mild CKD (GFR = 60-89 mL/min/1 73 square meters)    Stage 3A Moderate CKD (GFR = 45-59 mL/min/1 73 square meters)    Stage 3B Moderate CKD (GFR = 30-44 mL/min/1 73 square meters)    Stage 4 Severe CKD (GFR = 15-29 mL/min/1 73 square meters)    Stage 5 End Stage CKD (GFR <15 mL/min/1 73 square meters)  Note: GFR calculation is accurate only with a steady state creatinine    Protime-INR [[id number]]  (Abnormal) Collected: 09/02/21 1512    Lab Status: Final result Specimen: Blood from Arm, Right Updated: 09/02/21 1553     Protime 11 4 seconds      INR 0 84    APTT [[id number]]  (Normal) Collected: 09/02/21 1512    Lab Status: Final result Specimen: Blood from Arm, Right Updated: 09/02/21 1553     PTT 28 seconds     Troponin I [[id number]]  (Normal) Collected: 09/02/21 1512    Lab Status: Final result Specimen: Blood from Arm, Right Updated: 09/02/21 1553     Troponin I <0 02 ng/mL     CBC and differential [[id number]]  (Abnormal) Collected: 09/02/21 1512    Lab Status: Final result Specimen: Blood from Arm, Right Updated: 09/02/21 1522     WBC 11 20 Thousand/uL      RBC 4 44 Million/uL      Hemoglobin 13 5 g/dL      Hematocrit 41 0 %      MCV 92 fL      MCH 30 4 pg      MCHC 32 9 g/dL      RDW 13 0 %      MPV 9 3 fL      Platelets 675 Thousands/uL      nRBC 0 /100 WBCs      Neutrophils Relative 63 %      Immat GRANS % 0 %      Lymphocytes Relative 28 %      Monocytes Relative 8 %      Eosinophils Relative 1 %      Basophils Relative 0 %      Neutrophils Absolute 7 10 Thousands/µL      Immature Grans Absolute 0 05 Thousand/uL      Lymphocytes Absolute 3 09 Thousands/µL      Monocytes Absolute 0 84 Thousand/µL      Eosinophils Absolute 0 09 Thousand/µL      Basophils Absolute 0 03 Thousands/µL                  XR chest 1 view portable   Final Result by Asia Palmer MD (09/03 5087)      No acute cardiopulmonary disease  Workstation performed: [id number]                    Procedures  Procedures         ED Course                             SBIRT 20yo+      Most Recent Value   SBIRT (24 yo +)   In order to provide better care to our patients, we are screening all of our patients for alcohol and drug use  Would it be okay to ask you these screening questions?   No Filed at: 09/02/2021 1401        ELFEGO Risk Score      Most Recent Value   Age >= 65  0 Filed at: 09/02/2021 1626   Known CAD (stenosis >= 50%)  0 Filed at: 09/02/2021 1626   Recent (<=24 hrs) Service Angina  0 Filed at: 09/02/2021 1626   ST Deviation >= 0 5 mm  0 Filed at: 09/02/2021 1626   3+ CAD Risk Factors (FHx, HTN, HLP, DM, Smoker)  1 Filed at: 09/02/2021 1626   Aspirin Use Past 7 Days  0 Filed at: 09/02/2021 1626   Elevated Cardiac Markers  0 Filed at: 09/02/2021 1626   ELFEGO Risk Score (Calculated)  1 Filed at: 09/02/2021 1626                  [facility]  Number of Diagnoses or Management Options     Amount and/or Complexity of Data Reviewed  Clinical lab tests: ordered and reviewed  Tests in the radiology section of CPT®: ordered and reviewed    Risk of Complications, Morbidity, and/or Mortality  General comments: Patient admitted for further workup/management        Disposition  Final diagnoses:   Hypertension   Chest pain     Time reflects when diagnosis was documented in both MDM as applicable and the Disposition within this note     Time User Action Codes Description Comment    9/2/2021  4:17 PM Mamie Fester R Add [I10] Hypertension     9/2/2021  4:17 PM Mamie Fester R Add [R07 9] Chest pain     9/3/2021 10:39 AM [place] Add [E78 2] Mixed hyperlipidemia       ED Disposition     ED Disposition Condition Date/Time Comment    Admit Stable Thu Sep 2, 2021  4:16 PM Case was discussed with NAHOMI and the patient's admission status was agreed to be obs/tele        Follow-up Information    None         Discharge Medication List as of 9/3/2021 11:28 AM      START taking these medications    Details   amLODIPine (NORVASC) 5 mg tablet Take 1 tablet (5 mg total) by mouth daily, Starting Sat 9/4/2021, Normal      atorvastatin (LIPITOR) 10 mg tablet Take 1 tablet (10 mg total) by mouth daily with dinner, Starting Fri 9/3/2021, Normal         CONTINUE these medications which have NOT CHANGED    Details   ARIPiprazole (ABILIFY) 5 mg tablet Take 1 tablet (5 mg total) by mouth daily, Starting Wed 3/31/2021, Normal      clonazePAM (KlonoPIN) 0 5 mg tablet Take 0 5 mg by mouth 2 (two) times a day, Historical Med      cloNIDine (CATAPRES) 0 1 mg tablet Take 1 tablet (0 1 mg total) by mouth daily at bedtime, Starting Mon 10/12/2020, Print      DULoxetine (CYMBALTA) 30 mg delayed release capsule Take 30 mg by mouth daily, Historical Med      folic acid (FOLVITE) 1 mg tablet Take by mouth daily, Historical Med      gabapentin (NEURONTIN) 400 mg capsule Take 2 capsules (800 mg total) by mouth 3 (three) times a day, Starting Wed 3/31/2021, Normal      hydrOXYzine HCL (ATARAX) 50 mg tablet Take 1 tablet (50 mg total) by mouth 2 (two) times a day as needed for anxiety, Starting Wed 3/31/2021, Normal      levothyroxine 25 mcg tablet Take 1 tablet (25 mcg total) by mouth daily in the early morning, Starting Tue 10/13/2020, Print      metFORMIN (GLUCOPHAGE) 500 mg tablet Take 1 tablet (500 mg total) by mouth 2 (two) times a day with meals, Starting Wed 3/31/2021, Normal      metoprolol tartrate (LOPRESSOR) 50 mg tablet Take 1 tablet (50 mg total) by mouth every 12 (twelve) hours, Starting Wed 3/31/2021, Normal      traZODone (DESYREL) 100 mg tablet Take 200 mg by mouth daily at bedtime, Historical Med         STOP taking these medications       valACYclovir (VALTREX) 1,000 mg tablet Comments:   Reason for Stopping:         valACYclovir (VALTREX) 1,000 mg tablet Comments:   Reason for Stopping:             Outpatient Discharge Orders   Ambulatory referral to Cardiology   Standing Status: Future Standing Exp   Date: 09/03/22      Discharge Diet     Activity as tolerated       PDMP Review       Value Time User    PDMP Reviewed  Yes 9/2/2021  4:24 PM Ferrell Olszewski, PA-C          ED Provider  Electronically Signed by           Kinga Saxena MD  09/07/21 4237

## 2021-09-02 NOTE — ASSESSMENT & PLAN NOTE
With accelerated hypertension on arrival to ED, 235/116  · Now resolved with IV labetalol  · Maintained on clonidine and metoprolol as an outpatient, continue  · Monitor blood pressure per unit routine  · Will add Amlodipine 5 mg q d  · Hydralazine p r n

## 2021-09-03 VITALS
WEIGHT: 194 LBS | RESPIRATION RATE: 19 BRPM | OXYGEN SATURATION: 94 % | SYSTOLIC BLOOD PRESSURE: 112 MMHG | TEMPERATURE: 97.6 F | HEIGHT: 62 IN | HEART RATE: 87 BPM | BODY MASS INDEX: 35.7 KG/M2 | DIASTOLIC BLOOD PRESSURE: 69 MMHG

## 2021-09-03 LAB
ATRIAL RATE: 70 BPM
GLUCOSE SERPL-MCNC: 167 MG/DL (ref 65–140)
P AXIS: 44 DEGREES
PR INTERVAL: 210 MS
QRS AXIS: 4 DEGREES
QRSD INTERVAL: 74 MS
QT INTERVAL: 438 MS
QTC INTERVAL: 473 MS
T WAVE AXIS: 18 DEGREES
VENTRICULAR RATE: 70 BPM

## 2021-09-03 PROCEDURE — 82948 REAGENT STRIP/BLOOD GLUCOSE: CPT

## 2021-09-03 PROCEDURE — 93010 ELECTROCARDIOGRAM REPORT: CPT | Performed by: INTERNAL MEDICINE

## 2021-09-03 PROCEDURE — 99217 PR OBSERVATION CARE DISCHARGE MANAGEMENT: CPT | Performed by: PHYSICIAN ASSISTANT

## 2021-09-03 RX ORDER — ATORVASTATIN CALCIUM 10 MG/1
10 TABLET, FILM COATED ORAL
Qty: 30 TABLET | Refills: 0 | Status: SHIPPED | OUTPATIENT
Start: 2021-09-03 | End: 2022-03-31 | Stop reason: DRUGHIGH

## 2021-09-03 RX ORDER — AMLODIPINE BESYLATE 5 MG/1
5 TABLET ORAL DAILY
Qty: 30 TABLET | Refills: 0 | Status: SHIPPED | OUTPATIENT
Start: 2021-09-04 | End: 2022-03-31 | Stop reason: CLARIF

## 2021-09-03 RX ORDER — ATORVASTATIN CALCIUM 10 MG/1
10 TABLET, FILM COATED ORAL
Status: DISCONTINUED | OUTPATIENT
Start: 2021-09-03 | End: 2021-09-03 | Stop reason: HOSPADM

## 2021-09-03 RX ORDER — DIPHENHYDRAMINE HCL 25 MG
25 TABLET ORAL ONCE
Status: COMPLETED | OUTPATIENT
Start: 2021-09-03 | End: 2021-09-03

## 2021-09-03 RX ADMIN — LEVOTHYROXINE SODIUM 25 MCG: 25 TABLET ORAL at 06:00

## 2021-09-03 RX ADMIN — DIPHENHYDRAMINE HCL 25 MG: 25 TABLET, COATED ORAL at 11:25

## 2021-09-03 RX ADMIN — GABAPENTIN 800 MG: 400 CAPSULE ORAL at 08:37

## 2021-09-03 RX ADMIN — CLONAZEPAM 0.5 MG: 0.5 TABLET ORAL at 08:37

## 2021-09-03 RX ADMIN — AMLODIPINE BESYLATE 5 MG: 5 TABLET ORAL at 08:37

## 2021-09-03 RX ADMIN — ENOXAPARIN SODIUM 40 MG: 40 INJECTION SUBCUTANEOUS at 08:36

## 2021-09-03 RX ADMIN — DULOXETINE HYDROCHLORIDE 30 MG: 30 CAPSULE, DELAYED RELEASE ORAL at 08:37

## 2021-09-03 RX ADMIN — ARIPIPRAZOLE 5 MG: 5 TABLET ORAL at 08:38

## 2021-09-03 RX ADMIN — METOPROLOL TARTRATE 50 MG: 50 TABLET, FILM COATED ORAL at 08:37

## 2021-09-03 NOTE — ASSESSMENT & PLAN NOTE
Lab Results   Component Value Date    HGBA1C 8 6 (H) 07/01/2021       Recent Labs     09/02/21  1814 09/02/21  2045 09/03/21  0716   POCGLU 112 206* 167*       Blood Sugar Average: Last 72 hrs:  · (P) 210 [id number]   · patient maintained on metformin 1000 b i d , continue as outpatient  · Follow-up with PCP  · Diabetic diet

## 2021-09-03 NOTE — ASSESSMENT & PLAN NOTE
Chest discomfort and elevated home blood pressure readings x1 week  · Troponin x3 negative, EKG is without acute ischemic changes  · EKG performed in the emergency department with T-wave inversion in lead 3, however no significant change from previous EKG performed in March of 2021  · No acute events on telemetry overnight  · ELFEGO score 1  · Chest pressure secondary to accelerated hypertension, patient denies any chest pressure since admission to the hospital and controlling of blood pressures  · Will provide Cardiology referral for follow-up

## 2021-09-03 NOTE — ASSESSMENT & PLAN NOTE
· Not maintained on medication as an outpatient  · Repeat lipid panel revealed elevated LDL and triglycerides  · Will start patient on Lipitor 10 mg q d    · Follow-up with PCP

## 2021-09-03 NOTE — PLAN OF CARE
Problem: PAIN - ADULT  Goal: Verbalizes/displays adequate comfort level or baseline comfort level  Description: Interventions:  - Encourage patient to monitor pain and request assistance  - Assess pain using appropriate pain scale  - Administer analgesics based on type and severity of pain and evaluate response  - Implement non-pharmacological measures as appropriate and evaluate response  - Consider cultural and social influences on pain and pain management  - Notify physician/advanced practitioner if interventions unsuccessful or patient reports new pain  Outcome: Progressing     Problem: INFECTION - ADULT  Goal: Absence or prevention of progression during hospitalization  Description: INTERVENTIONS:  - Assess and monitor for signs and symptoms of infection  - Monitor lab/diagnostic results  - Monitor all insertion sites, i e  indwelling lines, tubes, and drains  - Monitor endotracheal if appropriate and nasal secretions for changes in amount and color  - [place] appropriate cooling/warming therapies per order  - Administer medications as ordered  - Instruct and encourage patient and family to use good hand hygiene technique  - Identify and instruct in appropriate isolation precautions for identified infection/condition  Outcome: Progressing  Goal: Absence of fever/infection during neutropenic period  Description: INTERVENTIONS:  - Monitor WBC    Outcome: Progressing     Problem: SAFETY ADULT  Goal: Patient will remain free of falls  Description: INTERVENTIONS:  - Educate patient/family on patient safety including physical limitations  - Instruct patient to call for assistance with activity   - Consult OT/PT to assist with strengthening/mobility   - Keep Call bell within reach  - Keep bed low and locked with side rails adjusted as appropriate  - Keep care items and personal belongings within reach  - Initiate and maintain comfort rounds  - Make Fall Risk Sign visible to staff  - Offer Toileting every  Hours, in advance of need  - Initiate/Maintain alarm  - Obtain necessary fall risk management equipment:   - Apply yellow socks and bracelet for high fall risk patients  - Consider moving patient to room near nurses station  Outcome: Progressing  Goal: Maintain or return to baseline ADL function  Description: INTERVENTIONS:  -  Assess patient's ability to carry out ADLs; assess patient's baseline for ADL function and identify physical deficits which impact ability to perform ADLs (bathing, care of mouth/teeth, toileting, grooming, dressing, etc )  - Assess/evaluate cause of self-care deficits   - Assess range of motion  - Assess patient's mobility; develop plan if impaired  - Assess patient's need for assistive devices and provide as appropriate  - Encourage maximum independence but intervene and supervise when necessary  - Involve family in performance of ADLs  - Assess for home care needs following discharge   - Consider OT consult to assist with ADL evaluation and planning for discharge  - Provide patient education as appropriate  Outcome: Progressing  Goal: Maintains/Returns to pre admission functional level  Description: INTERVENTIONS:  - Perform BMAT or MOVE assessment daily    - Set and communicate daily mobility goal to care team and patient/family/caregiver  - Collaborate with rehabilitation services on mobility goals if consulted  - Perform Range of Motion  times a day  - Reposition patient every  hours    - Dangle patient  times a day  - Stand patient  times a day  - Ambulate patient  times a day  - Out of bed to chair  times a day   - Out of bed for meals  times a day  - Out of bed for toileting  - Record patient progress and toleration of activity level   Outcome: Progressing     Problem: DISCHARGE PLANNING  Goal: Discharge to home or other facility with appropriate resources  Description: INTERVENTIONS:  - Identify barriers to discharge w/patient and caregiver  - Arrange for needed discharge resources and transportation as appropriate  - Identify discharge learning needs (meds, wound care, etc )  - Arrange for interpretive services to assist at discharge as needed  - Refer to Case Management Department for coordinating discharge planning if the patient needs post-hospital services based on physician/advanced practitioner order or complex needs related to functional status, cognitive ability, or social support system  Outcome: Progressing     Problem: Knowledge Deficit  Goal: Patient/family/caregiver demonstrates understanding of disease process, treatment plan, medications, and discharge instructions  Description: Complete learning assessment and assess knowledge base    Interventions:  - Provide teaching at level of understanding  - Provide teaching via preferred learning methods  Outcome: Progressing     Problem: SUBSTANCE USE/ABUSE  Goal: By discharge, will develop insight into their chemical dependency and sustain motivation to continue in recovery  Description: INTERVENTIONS:  - Attends all daily group sessions and scheduled AA groups  - Actively practices coping skills through participation in the therapeutic community and adherence to program rules  - Reviews and completes assignments from individual treatment plan  - Assist patient development of understanding of their personal cycle of addiction and relapse triggers  Outcome: Progressing  Goal: By discharge, patient will have ongoing treatment plan addressing chemical dependency  Description: INTERVENTIONS:  - Assist patient with resources and/or appointments for ongoing recovery based living  Outcome: Progressing     Problem: Potential for Falls  Goal: Patient will remain free of falls  Description: INTERVENTIONS:  - Educate patient/family on patient safety including physical limitations  - Instruct patient to call for assistance with activity   - Consult OT/PT to assist with strengthening/mobility   - Keep Call bell within reach  - Keep bed low and locked with side rails adjusted as appropriate  - Keep care items and personal belongings within reach  - Initiate and maintain comfort rounds  - Make Fall Risk Sign visible to staff  - Offer Toileting every Hours, in advance of need  - Initiate/Maintain alarm  - Obtain necessary fall risk management equipment:   - Apply yellow socks and bracelet for high fall risk patients  - Consider moving patient to room near nurses station  Outcome: Progressing

## 2021-09-03 NOTE — ASSESSMENT & PLAN NOTE
With accelerated hypertension on arrival to ED, 235/116  · Now resolved with IV labetalol  · Maintained on clonidine and metoprolol as an outpatient, continue  · Monitor blood pressure per unit routine--> most recent 112/69  · Amlodipine 5 mg added with appropriate response of blood pressure, will prescribe  · Follow-up with PCP

## 2021-09-03 NOTE — DISCHARGE SUMMARY
Leonela 50  Discharge- [facility] 1965, 50 y o  female MRN: [id number]  Unit/Bed#: E4 -01 Encounter: [id number]  Primary Care Provider: Callie Gilford, CRNP   Date and time admitted to hospital: 9/2/2021  1:55 PM    * Chest pressure  Assessment & Plan  Chest discomfort and elevated home blood pressure readings x1 week  · Troponin x3 negative, EKG is without acute ischemic changes  · EKG performed in the emergency department with T-wave inversion in lead 3, however no significant change from previous EKG performed in March of 2021  · No acute events on telemetry overnight  · ELFEGO score 1  · Chest pressure secondary to accelerated hypertension, patient denies any chest pressure since admission to the hospital and controlling of blood pressures  · Will provide Cardiology referral for follow-up    Essential hypertension  Assessment & Plan  With accelerated hypertension on arrival to ED, 235/116  · Now resolved with IV labetalol  · Maintained on clonidine and metoprolol as an outpatient, continue  · Monitor blood pressure per unit routine--> most recent 112/69  · Amlodipine 5 mg added with appropriate response of blood pressure, will prescribe  · Follow-up with PCP    Hyperlipemia  Assessment & Plan  · Not maintained on medication as an outpatient  · Repeat lipid panel revealed elevated LDL and triglycerides  · Will start patient on Lipitor 10 mg q d    · Follow-up with PCP    Tobacco abuse  Assessment & Plan  · Reportedly smokes half pack per day  · Nicotine patch ordered  · Counseled on the importance of smoking cessation    Type 2 diabetes mellitus without complication, without long-term current use of insulin [facility])  Assessment & Plan  Lab Results   Component Value Date    HGBA1C 8 6 (H) 07/01/2021       Recent Labs     09/02/21  1814 09/02/21  2045 09/03/21  0716   POCGLU 112 206* 167*       Blood Sugar Average: Last 72 hrs:  · (P) 673 [id number]   · patient maintained on metformin 1000 b i d , continue as outpatient  · Follow-up with PCP  · Diabetic diet    Generalized anxiety disorder  Assessment & Plan  · Continue Atarax b i d  P r n , Cymbalta 30 mg q d , Abilify 5 mg, trazodone 200 HS      Discharging Physician / Practitioner: Lion Artis PA-C  PCP: Radha Guerrero, Darcie Schroeder  Admission Date:   Admission Orders (From admission, onward)     Ordered        09/02/21 1617  Place in Observation  Once                   Discharge Date: 09/03/21    Medical Problems     Resolved Problems  Date Reviewed: 3/31/2021    None                Consultations During Hospital Stay:  · None    Procedures Performed:   · None    Significant Findings / Test Results:   · None    Incidental Findings:   · None     Test Results Pending at Discharge (will require follow up): · None     Outpatient Tests Requested:  · Follow-up with PCP  · Follow-up with outpatient Cardiology    Complications:  None    Reason for Admission:  Chest pressure/accelerated hypertension    Hospital Course:     Alexandra Amin is a 50 y o  female patient with past medical history of bipolar disorder, anxiety, hyperlipidemia, hypertension, non-insulin-dependent type 2 by diabetes who originally presented to the hospital on 9/2/2021 due to chest pressure and high blood pressures x1 week  According to the patient, she was told to obtain a blood pressure monitor by her primary care provider who noted that she was having increasingly elevated blood pressure readings at the office  She stated that she has been taking her blood pressure on a routine basis and noted that over the last week her blood pressures were significantly elevated in the high 190s-200s  Additionally, she reported associated chest pressure with increased blood pressures  She came to the ED for further evaluation  While admitted to the hospital, troponins were trended with serial EKGs which were negative for any signs of ACS    Amlodipine was added to the patient's antihypertensive regimen with appropriate response and blood pressures  She was stable for discharge on 09/03  Please see above list of diagnoses and related plan for additional information  Condition at Discharge: stable     Discharge Day Visit / Exam:     Subjective: The patient states that she is feeling well today  She denies any associated chest pressure since being admitted to the hospital   She denies any vision changes, headache, chest pain, shortness of breath, increase in leg swelling  She states that she feels ready to go home  Vitals: Blood Pressure: 112/69 (09/03/21 0806)  Pulse: 87 (09/03/21 0806)  Temperature: 97 6 °F (36 4 °C) (09/03/21 0806)  Temp Source: Temporal (09/03/21 0806)  Respirations: 19 (09/03/21 0806)  Height: 5' 2" (157 5 cm) (09/02/21 1908)  Weight - Scale: 88 kg (194 lb 0 1 oz) (09/02/21 1348)  SpO2: 94 % (09/03/21 0806)  Exam:   Physical Exam  Vitals and nursing note reviewed  Constitutional:       General: She is not in acute distress  Appearance: She is well-developed  She is obese  She is not ill-appearing, toxic-appearing or diaphoretic  HENT:      Head: Normocephalic and atraumatic  Eyes:      General: No scleral icterus  Conjunctiva/sclera: Conjunctivae normal    Cardiovascular:      Rate and Rhythm: Normal rate and regular rhythm  Heart sounds: No murmur heard  No friction rub  No gallop  Pulmonary:      Effort: Pulmonary effort is normal  No respiratory distress  Breath sounds: Normal breath sounds  No stridor  No wheezing, rhonchi or rales  Chest:      Chest wall: No tenderness  Abdominal:      General: Abdomen is flat  Bowel sounds are normal       Palpations: Abdomen is soft  Tenderness: There is no abdominal tenderness  Musculoskeletal:      Cervical back: Neck supple  Right lower leg: No edema  Left lower leg: No edema  Skin:     General: Skin is warm and dry  Coloration: Skin is not jaundiced or pale  Findings: No erythema  Neurological:      Mental Status: She is alert  Discussion with Family:  Discussed plan of care with patient, stated she would update her family accordingly  Discharge instructions/Information to patient and family:   See after visit summary for information provided to patient and family  Provisions for Follow-Up Care:  See after visit summary for information related to follow-up care and any pertinent home health orders  Disposition:     Home    For Discharges to [facility] SNF:   · Not Applicable to this Patient - Not Applicable to this Patient    Planned Readmission:  Not applicable     Discharge Statement:  I spent 38 minutes discharging the patient  This time was spent on the day of discharge  I had direct contact with the patient on the day of discharge  Greater than 50% of the total time was spent examining patient, answering all patient questions, arranging and discussing plan of care with patient as well as directly providing post-discharge instructions  Additional time then spent on discharge activities  Discharge Medications:  See after visit summary for reconciled discharge medications provided to patient and family        ** Please Note: This note has been constructed using a voice recognition system **

## 2021-09-03 NOTE — PLAN OF CARE
Problem: PAIN - ADULT  Goal: Verbalizes/displays adequate comfort level or baseline comfort level  Description: Interventions:  - Encourage patient to monitor pain and request assistance  - Assess pain using appropriate pain scale  - Administer analgesics based on type and severity of pain and evaluate response  - Implement non-pharmacological measures as appropriate and evaluate response  - Consider cultural and social influences on pain and pain management  - Notify physician/advanced practitioner if interventions unsuccessful or patient reports new pain  Outcome: Adequate for Discharge     Problem: INFECTION - ADULT  Goal: Absence or prevention of progression during hospitalization  Description: INTERVENTIONS:  - Assess and monitor for signs and symptoms of infection  - Monitor lab/diagnostic results  - Monitor all insertion sites, i e  indwelling lines, tubes, and drains  - Monitor endotracheal if appropriate and nasal secretions for changes in amount and color  - [place] appropriate cooling/warming therapies per order  - Administer medications as ordered  - Instruct and encourage patient and family to use good hand hygiene technique  - Identify and instruct in appropriate isolation precautions for identified infection/condition  Outcome: Adequate for Discharge  Goal: Absence of fever/infection during neutropenic period  Description: INTERVENTIONS:  - Monitor WBC    Outcome: Adequate for Discharge     Problem: SAFETY ADULT  Goal: Patient will remain free of falls  Description: INTERVENTIONS:  - Educate patient/family on patient safety including physical limitations  - Instruct patient to call for assistance with activity   - Consult OT/PT to assist with strengthening/mobility   - Keep Call bell within reach  - Keep bed low and locked with side rails adjusted as appropriate  - Keep care items and personal belongings within reach  - Initiate and maintain comfort rounds  - Make Fall Risk Sign visible to staff  - Offer Toileting every Hours, in advance of need  - Initiate/Maintain alarm  - Obtain necessary fall risk management equipment:   - Apply yellow socks and bracelet for high fall risk patients  - Consider moving patient to room near nurses station  Outcome: Adequate for Discharge  Goal: Maintain or return to baseline ADL function  Description: INTERVENTIONS:  -  Assess patient's ability to carry out ADLs; assess patient's baseline for ADL function and identify physical deficits which impact ability to perform ADLs (bathing, care of mouth/teeth, toileting, grooming, dressing, etc )  - Assess/evaluate cause of self-care deficits   - Assess range of motion  - Assess patient's mobility; develop plan if impaired  - Assess patient's need for assistive devices and provide as appropriate  - Encourage maximum independence but intervene and supervise when necessary  - Involve family in performance of ADLs  - Assess for home care needs following discharge   - Consider OT consult to assist with ADL evaluation and planning for discharge  - Provide patient education as appropriate  Outcome: Adequate for Discharge  Goal: Maintains/Returns to pre admission functional level  Description: INTERVENTIONS:  - Perform BMAT or MOVE assessment daily    - Set and communicate daily mobility goal to care team and patient/family/caregiver  - Collaborate with rehabilitation services on mobility goals if consulted  - Perform Range of Motion  times a day  - Reposition patient every hours    - Dangle patient  times a day  - Stand patient times a day  - Ambulate patient times a day  - Out of bed to chair times a day   - Out of bed for meals  times a day  - Out of bed for toileting  - Record patient progress and toleration of activity level   Outcome: Adequate for Discharge     Problem: DISCHARGE PLANNING  Goal: Discharge to home or other facility with appropriate resources  Description: INTERVENTIONS:  - Identify barriers to discharge w/patient and caregiver  - Arrange for needed discharge resources and transportation as appropriate  - Identify discharge learning needs (meds, wound care, etc )  - Arrange for interpretive services to assist at discharge as needed  - Refer to Case Management Department for coordinating discharge planning if the patient needs post-hospital services based on physician/advanced practitioner order or complex needs related to functional status, cognitive ability, or social support system  Outcome: Completed     Problem: Knowledge Deficit  Goal: Patient/family/caregiver demonstrates understanding of disease process, treatment plan, medications, and discharge instructions  Description: Complete learning assessment and assess knowledge base    Interventions:  - Provide teaching at level of understanding  - Provide teaching via preferred learning methods  Outcome: Adequate for Discharge     Problem: SUBSTANCE USE/ABUSE  Goal: By discharge, will develop insight into their chemical dependency and sustain motivation to continue in recovery  Description: INTERVENTIONS:  - Attends all daily group sessions and scheduled AA groups  - Actively practices coping skills through participation in the therapeutic community and adherence to program rules  - Reviews and completes assignments from individual treatment plan  - Assist patient development of understanding of their personal cycle of addiction and relapse triggers  Outcome: Adequate for Discharge  Goal: By discharge, patient will have ongoing treatment plan addressing chemical dependency  Description: INTERVENTIONS:  - Assist patient with resources and/or appointments for ongoing recovery based living  Outcome: Adequate for Discharge

## 2021-11-22 NOTE — PROGRESS NOTES
42 Patient was cooperative with medications and depressed and scant in conversation this morning  Said she came in because she "wasn't feeling too happy " Audrey Clemons she had been depressed and having SI, though she declined it currently  Said she feels anxious "to the point where she's been clenching her jaw" and appeared slightly medication; received a scheduled Ativan with her morning medications

## 2021-11-30 NOTE — ASSESSMENT & PLAN NOTE
Paged Nephrology regarding CRRT this AM. Awaiting response. Smoking cessation counseling provided to patient, initiate nicotine patch

## 2021-12-09 ENCOUNTER — OFFICE VISIT (OUTPATIENT)
Dept: FAMILY MEDICINE CLINIC | Facility: CLINIC | Age: 56
End: 2021-12-09
Payer: COMMERCIAL

## 2021-12-09 VITALS
SYSTOLIC BLOOD PRESSURE: 150 MMHG | WEIGHT: 203.4 LBS | RESPIRATION RATE: 16 BRPM | OXYGEN SATURATION: 97 % | BODY MASS INDEX: 36.04 KG/M2 | TEMPERATURE: 97.6 F | HEART RATE: 93 BPM | DIASTOLIC BLOOD PRESSURE: 100 MMHG | HEIGHT: 63 IN

## 2021-12-09 DIAGNOSIS — E55.9 VITAMIN D DEFICIENCY: ICD-10-CM

## 2021-12-09 DIAGNOSIS — Z12.31 SCREENING MAMMOGRAM FOR BREAST CANCER: ICD-10-CM

## 2021-12-09 DIAGNOSIS — E66.9 DIABETES MELLITUS TYPE 2 IN OBESE (HCC): ICD-10-CM

## 2021-12-09 DIAGNOSIS — I10 PRIMARY HYPERTENSION: ICD-10-CM

## 2021-12-09 DIAGNOSIS — Z00.00 MEDICARE ANNUAL WELLNESS VISIT, INITIAL: Primary | ICD-10-CM

## 2021-12-09 DIAGNOSIS — E78.2 MIXED HYPERLIPIDEMIA: ICD-10-CM

## 2021-12-09 DIAGNOSIS — R53.83 TIREDNESS: ICD-10-CM

## 2021-12-09 DIAGNOSIS — E11.69 DIABETES MELLITUS TYPE 2 IN OBESE (HCC): ICD-10-CM

## 2021-12-09 DIAGNOSIS — Z12.39 SCREENING BREAST EXAMINATION: ICD-10-CM

## 2021-12-09 DIAGNOSIS — E03.9 HYPOTHYROIDISM, UNSPECIFIED TYPE: ICD-10-CM

## 2021-12-09 DIAGNOSIS — F31.9 BIPOLAR I DISORDER WITH ANXIOUS DISTRESS (HCC): ICD-10-CM

## 2021-12-09 DIAGNOSIS — F31.4 BIPOLAR DISORDER, CURRENT EPISODE DEPRESSED, SEVERE, WITHOUT PSYCHOTIC FEATURES (HCC): ICD-10-CM

## 2021-12-09 DIAGNOSIS — E03.9 ACQUIRED HYPOTHYROIDISM: ICD-10-CM

## 2021-12-09 DIAGNOSIS — E66.01 OBESITY, MORBID (HCC): ICD-10-CM

## 2021-12-09 PROBLEM — E11.9 TYPE 2 DIABETES MELLITUS WITHOUT COMPLICATION, WITHOUT LONG-TERM CURRENT USE OF INSULIN (HCC): Status: RESOLVED | Noted: 2020-05-14 | Resolved: 2021-12-09

## 2021-12-09 PROCEDURE — 3008F BODY MASS INDEX DOCD: CPT | Performed by: INTERNAL MEDICINE

## 2021-12-09 PROCEDURE — G0438 PPPS, INITIAL VISIT: HCPCS | Performed by: INTERNAL MEDICINE

## 2021-12-09 PROCEDURE — 99215 OFFICE O/P EST HI 40 MIN: CPT | Performed by: INTERNAL MEDICINE

## 2021-12-09 PROCEDURE — 3725F SCREEN DEPRESSION PERFORMED: CPT | Performed by: INTERNAL MEDICINE

## 2021-12-09 PROCEDURE — 4004F PT TOBACCO SCREEN RCVD TLK: CPT | Performed by: INTERNAL MEDICINE

## 2021-12-09 RX ORDER — ERGOCALCIFEROL 1.25 MG/1
CAPSULE ORAL
Qty: 12 CAPSULE | Refills: 1 | Status: SHIPPED | OUTPATIENT
Start: 2021-12-09 | End: 2022-07-25

## 2021-12-09 RX ORDER — ATORVASTATIN CALCIUM 40 MG/1
TABLET, FILM COATED ORAL
COMMUNITY
Start: 2021-10-16 | End: 2022-03-31

## 2021-12-09 RX ORDER — GABAPENTIN 800 MG/1
TABLET ORAL
COMMUNITY
Start: 2021-11-10 | End: 2021-12-09 | Stop reason: SDUPTHER

## 2021-12-09 RX ORDER — LISINOPRIL AND HYDROCHLOROTHIAZIDE 12.5; 1 MG/1; MG/1
1 TABLET ORAL DAILY
Qty: 90 TABLET | Refills: 1 | Status: SHIPPED | OUTPATIENT
Start: 2021-12-09 | End: 2022-06-14

## 2021-12-09 RX ORDER — BUPROPION HYDROCHLORIDE 150 MG/1
150 TABLET, EXTENDED RELEASE ORAL DAILY
COMMUNITY
Start: 2021-11-14

## 2021-12-09 RX ORDER — TRAZODONE HYDROCHLORIDE 100 MG/1
200 TABLET ORAL
Qty: 60 TABLET | Refills: 2 | Status: SHIPPED | OUTPATIENT
Start: 2021-12-09 | End: 2022-01-03 | Stop reason: SDUPTHER

## 2021-12-09 RX ORDER — INSULIN GLARGINE 100 [IU]/ML
14 INJECTION, SOLUTION SUBCUTANEOUS DAILY
COMMUNITY
Start: 2021-07-02 | End: 2022-06-22

## 2021-12-09 RX ORDER — LISINOPRIL AND HYDROCHLOROTHIAZIDE 12.5; 1 MG/1; MG/1
1 TABLET ORAL DAILY
Qty: 30 TABLET | Refills: 5 | Status: SHIPPED | OUTPATIENT
Start: 2021-12-09 | End: 2021-12-09

## 2021-12-09 RX ORDER — GABAPENTIN 800 MG/1
800 TABLET ORAL 3 TIMES DAILY
Qty: 90 TABLET | Refills: 2 | Status: SHIPPED | OUTPATIENT
Start: 2021-12-09 | End: 2022-02-21

## 2021-12-09 RX ORDER — ERGOCALCIFEROL 1.25 MG/1
50000 CAPSULE ORAL WEEKLY
Qty: 8 CAPSULE | Refills: 0 | Status: SHIPPED | OUTPATIENT
Start: 2021-12-09 | End: 2021-12-09

## 2021-12-09 RX ORDER — BENZTROPINE MESYLATE 1 MG/1
1 TABLET ORAL DAILY
COMMUNITY
Start: 2021-07-23

## 2021-12-10 ENCOUNTER — TELEPHONE (OUTPATIENT)
Dept: ADMINISTRATIVE | Facility: OTHER | Age: 56
End: 2021-12-10

## 2021-12-20 NOTE — EMTALA/ACUTE CARE TRANSFER
[facility] DISCHARGE SUMMARY:     ADMIT DATE: 12/8/2021     DISCHARGE DATE: 12/21/2021      PCP: Marcello Angela MD   DATE PCP NOTIFIED:  12/20/2021  METHOD OF NOTIFICATION: Gyft    CONSULTING PHYSICIAN(s):     Gastroenterology - Dr. Ricky Conroy   Pulmonology - Dr. Etienne Lutz   Neurology - Dr. Qamar Jugn   Cardiology - Dr. Mason Cruz   Electrophysiology - Dr. Santhosh Thompson   General Surgery - Dr. Bel Hernadez   Infectious Disease - Dr. Lizabeth Lucas       CONDITION ON DISCHARGE: Stable    CODE STATUS:   Code Status: Full Resuscitation      DISCHARGE DIAGNOSIS:     · Left lower quadrant abdominal discomfort secondary to acute diverticulitis, perforated diverticulitis s/p exploratory laparotomy, appendectomy, Frida procedure, and small bowel repair by Dr. Mix on (12/14/2021) generalized peritonitis.  · Postoperative hypoxia s/p above procedure  · Worsening shortness of breath and cough prior to arrival  - now resolved possibly secondary to COPD exacerbation  secondary to RSV (in a patient with history of COPD, known interstitial lung disease)  · Covid vaccinated x3 doses  · Acute on chronic hypoxic respiratory failure Ruled Out  · Diarrhea (improving) in the setting of acute sigmoid diverticulitis  · Scrotum pain   · Leukocytosis in the setting of IV steroids and acute diverticulitis  · Transaminitis and bilirubinemia  · Incidental finding of possible 7mm mass in left kidney  · Incidental finding of enlarged prostate  · Paroxysmal atrial fibrillation  · Asymptomatic bradycardia with some junctional escape beats in a patient with history of atrial fibrillation noted 2/11/2021  · Mild hyponatremia likely prerenal secondary to volume depletion from GI loss - resolved  · Pruritic Rash likely secondary to recent drug use (amoxicillin and Bactrim )with known allergy - resolved  · Expressive aphasia/slurring, unclear etiology - resolved  · CVA ruled out   · Elevated D-dimer, ruled out  [id number]  [address] [facility] [id number]  Dept: [phone]      EMTALA TRANSFER CONSENT    NAME Bita CONTE 1965                              MRN [id number]    I have been informed of my rights regarding examination, treatment, and transfer   by Dr Kerry Granados, *    Benefits: Continuity of care    Risks: Potential for delay in receiving treatment, Potential deterioration of medical condition, Increased discomfort during transfer      Consent for Transfer:  I acknowledge that my medical condition has been evaluated and explained to me by the emergency department physician or other qualified medical person and/or my attending physician, who has recommended that I be transferred to the service of  Accepting Physician: DR Krista Hodges at [address] [place] [address] Rd Name, [address] : [address]   The above potential benefits of such transfer, the potential risks associated with such transfer, and the probable risks of not being transferred have been explained to me, and I fully understand them  The doctor has explained that, in my case, the benefits of transfer outweigh the risks  I agree to be transferred  I authorize the performance of emergency medical procedures and treatments upon me in both transit and upon arrival at the receiving facility  Additionally, I authorize the release of any and all medical records to the receiving facility and request they be transported with me, if possible  I understand that the safest mode of transportation during a medical emergency is an ambulance and that the Hospital advocates the use of this mode of transport  Risks of traveling to the receiving facility by car, including absence of medical control, life sustaining equipment, such as oxygen, and medical personnel has been explained to me and I fully understand them      ([place] BELOW)  [  ]  I consent to the stated transfer and to be transported by ambulance/helicopter  [  ]  I consent to the stated transfer, but refuse transportation by ambulance and accept full responsibility for my transportation by car  I understand the risks of non-ambulance transfers and I exonerate the Hospital and its staff from any deterioration in my condition that results from this refusal     X___________________________________________    DATE  20  TIME________  Signature of patient or legally responsible individual signing on patient behalf           RELATIONSHIP TO PATIENT_________________________          Provider Certification    NAME Anusha Esteves                                         1965                              MRN [id number]    A medical screening exam was performed on the above named patient  Based on the examination:    Condition Necessitating Transfer The primary encounter diagnosis was Alcohol abuse  Diagnoses of Depression and Suicidal ideation were also pertinent to this visit  Patient Condition: The patient has been stabilized such that within reasonable medical probability, no material deterioration of the patient condition or the condition of the unborn child(sage) is likely to result from the transfer    Reason for Transfer: Level of Care needed not available at this facility    Transfer Requirements: Facility [address]    · Space available and qualified personnel available for treatment as acknowledged by Carrie Pittman ([facility]) [phone]  · Agreed to accept transfer and to provide appropriate medical treatment as acknowledged by       DR Valentina Tracy  · Appropriate medical records of the examination and treatment of the patient are provided at the time of transfer   [address] [facility], [address] _______  · Transfer will be performed by qualified personnel from [facility]   and appropriate transfer equipment as required, including the use of PE  · Anxiety  · BPH    PROCEDURES PERFORMED DURING ADMISSION AND DATES PERFORMED:    Exploratory Laparotomy, appendectomy, Frida procedure, and small bowel repair (12/14/21)     HOSPITAL COURSE:   Yoni is a 74 year old male, a patient of Marcello Angela MD, with past medical history of RAD, BPH, diverticulosis, COPD, Atrial fibrillation on Eliquis, and chronic pain who presented to the ED with complaints of acute weakness, non productive cough, shortness of breath, weight loss, and diarrhea for the past 3 weeks. Patient was encouraged to be evaluated in the ED by his PCP after being seen by him on the day of presentation.     Upon arrival to the ED, the patient was afebrile with HR 65, RR 16, SpO2 97%, and /77. Labs were significant for CRP 6.2, Sodium 130, AST 54, ALT 80, procal 0.92, Ferritin 593, D-dimer 1.04, and NTproBNP 838. A CTA of the chest was negative for PE, but fibrosis versus infection could not be ruled out. A chest x-ray noted low lung volume and coarse bandlike density in the right midlung which could represent focus of atelectasis or fluid within the minor fissure. Obscured right lung base, likely related to pleural fluid. Mild haziness of the parenchyma with prominent vascular markings. No pneumothorax. In the ED the patient was given IV Rocephin, IV azythromycin, and IV methylprednisolone. Subsequently, [facility] admitted the patient for further workup and management.     In light of the patient's reported symptoms, a Covid test was ordered and returned negative. Patient was continued on steroids and antibiotics. He was provided respiratory support with Symbicort and Flonase. Pulmonology was consulted for further evaluation and suggested an exercise desaturation study. The CT chest taken in the ED was reviewed due to the patient's elevated D-dimer and was negative for a PE. IV fluids were continued.     Due to the patient's complaints of diarrhea and occasional abdominal pain, a CT  abdomen and pelvis was ordered revealing revealed colonic diverticulosis without diverticulitis. GI was consulted for further workup. C.diff, campylobacter, and GI panel tests returned negative. Antibiotic coverage was switched to oral vancomycin. Lomotil was started and a colonoscopy was planned. Eliquis was held in anticipation of this procedure.     The patient was noticed to slur his speech and a CT head was ordered that returned negative for any acute process. Neurology was consulted and reviewed the imaging and recommended an MRI which was negative for acute ischemia. The patient was cleared to resume oral anticoagulation.     At this time, the patient tested positive for RSV. His inflammatory markers were monitored and antibiotic coverage was changed to IV Merrem. Steroid therapy was continued and patient was kept NPO. The colonoscopy was cancelled due to a repeat CT abdomen and pelvis showing acute diverticulitis. A stool bacterial culture returned growing isolated Candida albicans.     A ultrasound of the kidney revealed an incidental finding of a 7 mm mass in the patient's left kidney. This imaging also revealed an enlarged prostate.     The patient was noticed to have asymptomatic bradycardia and Cardiology started the patient on metoprolol and held sotalol until his diverticulitis improved.     Patient reported pain in his right groin area with radiation to his right scrotum for which an ultrasound was performed and returned negative for testicular torsion.     Due to the patient's rising white blood cell counts and worsening abdominal pain, a STAT CT of the abdomen and pelvis was obtained. General surgery and ID were consulted. The patient was found to have perforated diverticulitis and went for emergency surgery on 12/14/21. Eliquis was reversed with Kcentra. The patient underwent an exploratory laparotomy, appendectomy, Frida procedure, and small bowel repair. He was continued on antibiotics and  necessary and appropriate life support measures  Provider Certification: I have examined the patient and explained the following risks and benefits of being transferred/refusing transfer to the patient/family:  General risk, such as traffic hazards, adverse weather conditions, rough terrain or turbulence, possible failure of equipment (including vehicle or aircraft), or consequences of actions of persons outside the control of the transport personnel      Based on these reasonable risks and benefits to the patient and/or the unborn child(sage), and based upon the information available at the time of the patients examination, I certify that the medical benefits reasonably to be expected from the provision of appropriate medical treatments at another medical facility outweigh the increasing risks, if any, to the individuals medical condition, and in the case of labor to the unborn child, from effecting the transfer      X____________________________________________ DATE 03/14/20        TIME_______      ORIGINAL - SEND TO MEDICAL RECORDS   COPY - SEND WITH PATIENT DURING TRANSFER antifungal treatments while under intensive care after this procedure and his diet was advanced per General Surgery. He developed post-operative hypoxia and required supplemental oxygen. Patient's pain was managed. Physical therapy worked with the patient as he was able.     An episode of atrial fibrillation was noticed by the nursing staff overnight on 12/11. A Cardizem drip was started. The patient was anticoagulated with Lovenox. Later the patient transitioned to oral Cardizem. Sotolol was discontinued due to bradycardia.     Advancements in diet were tolerated and his supplemental oxygen was weaned down. He continued to work with Physical therapy services with recommendations to continue therapies 6 days per week. After he began tolerating a low residue diet, the patient was cleared for discharge to an acute rehab where his care would be continued. His Ostomy output was high initially, however on the day of discharge it was 225cc.  He was placed on immodium titrated to stools.  IT is expected that his stools will firm up.     All of the patient's other chronic issues remained stable, and no additional acute interventions or new medications needed to be initiated during this admission. Currently, the patient remains stable and has no further acute complaints or issues at this time. Yoni has now been deemed stable for discharge by all services and was instructed to follow up with PCP and specialists documented. The patient has confirmed understanding of the hospital course and discharge instructions and is agreeable to the discharge plan.    DISCHARGE INSTRUCTIONS  DISCHARGE To: [facility]   DIET: Fiber Restricted (residue Low) Diet  Nutrition Communication  2 Times/day W Breakfast & Dinner; Ensure High Protein/high Protein Low Carbohydrate Supplement, Chocolate Oral Nutrition Supplement  ACTIVITY: As tolerable  HOME HEALTH CARE RN/PT/OT/MSW/Wound/Ostomy Agency: N/A  HOME OXYGEN: None    PENDING TEST RESULTS AND PROBLEMS  NEEDING F/U:    Incidental finding of possible 7mm mass in left kidney  - Correlation with US or pre-/postcontrast MRI is highly suggested.     Incidental finding of enlarged prostate  - Clinical workup highly advised to patient to exclude neoplasm. This can be pursued as outpatient - discussed with patient.    FOLLOW-UP APPOINTMENTS:     No follow-up provider specified.       DISCHARGE MEDICATION LIST:     Summary of your Discharge Medications      ASK your doctor about these medications      Details   sotalol 120 MG tablet  Commonly known as: BETAPACE  Ask about: Which instructions should I use?   Take 120 mg by mouth 2 times daily.             NEW MEDICATIONS / MEDICATIONS DISCONTINUED / DOSAGE CHANGES DURING THIS ADMISSION: Per med rec    ALLERGIES:  ALLERGIES:   Allergen Reactions   • Moxifloxacin RASH   • Amoxicillin RASH   • Sulfa Antibiotics RASH        PHYSICAL EXAMINATION:   Physical Exam  Vitals and nursing note reviewed.   Constitutional:       General: He is not in acute distress.  HENT:      Head: Atraumatic.   Eyes:      Conjunctiva/sclera: Conjunctivae normal.   Cardiovascular:      Rate and Rhythm: Normal rate and regular rhythm.      Heart sounds: No murmur heard.      Pulmonary:      Effort: No respiratory distress.      Breath sounds: Normal breath sounds. No wheezing.   Abdominal:      General: There is no distension.      Palpations: Abdomen is soft.      Tenderness: There is no abdominal tenderness.      Comments: Midline incision c/d/.   Ostomy with small amount of liquid stool.    Musculoskeletal:      Right lower leg: No edema.      Left lower leg: No edema.   Skin:     General: Skin is warm.   Neurological:      Mental Status: He is alert and oriented to person, place, and time.          VITALS:  Visit Vitals  /71 (BP Location: LUE - Left upper extremity, Patient Position: Semi-Roca's)   Pulse 92   Temp 98.4 °F (36.9 °C) (Temporal)   Resp 18   Ht 6' 2\" (1.88 m)   Wt 109.4 kg (241  lb 2.9 oz)   SpO2 93%   BMI 30.97 kg/m²       LABS:   Recent Labs   Lab 12/20/21  0431 12/19/21  0644 12/19/21  0415 12/18/21  0421 12/17/21  0347 12/16/21  0430 12/16/21  0425 12/15/21  2245 12/15/21  0536 12/15/21  0536   WBC 28.9*  --  25.9* 31.5*   < >  --    < > 43.2*   < > 28.6*   HCT 39.3  --  40.1 42.0   < >  --    < > 52.4*   < > 48.0   HGB 13.1  --  13.2 13.4   < >  --    < > 16.8   < > 15.6     --  268 267   < >  --    < > 359   < > 409   SODIUM 136 138  --  143   < > 139   < > 139   < > 137   POTASSIUM 4.1 4.1  --  4.4   < > 4.8   < > 5.1   < > 4.5   CHLORIDE 104 105  --  108*   < > 105   < > 104   < > 103   CO2 26 26  --  29   < > 28   < > 29   < > 26   CALCIUM 6.8* 7.0*  --  7.6*   < > 7.3*   < > 7.8*   < > 7.4*   GLUCOSE 86 93  --  96   < > 116*   < > 125*   < > 101*   BUN 20 24*  --  33*   < > 24*   < > 23*   < > 18   CREATININE 0.73 0.79  --  0.81   < > 1.08   < > 1.18*   < > 0.78   AST  --   --   --   --   --  37  --  39*  --  57*   GPT  --   --   --   --   --  114*  --  138*  --  184*   ALKPT  --   --   --   --   --  102  --  101  --  92   BILIRUBIN  --   --   --   --   --  3.8*  --  4.1*  --  2.9*   ALBUMIN  --   --   --   --   --  1.5*  --  1.8*  --  1.9*   PHOS  --   --  1.9*  --   --  3.2  --   --   --   --     < > = values in this interval not displayed.       IMAGING:   No results found.        TIME SPENT:  > 31 minutes       Charting performed by chelsey Crawford for Melissa Najera PA-C      All medical record entries made by the scribe were at my direction. I have reviewed the chart and agree that the record accurately reflects my personal performance of the history, physical exam, hospital course, and assessment and plan.    Discussed with my attending Dr Tarik Jamil PA-C  [facility]

## 2022-01-03 ENCOUNTER — TELEPHONE (OUTPATIENT)
Dept: FAMILY MEDICINE CLINIC | Facility: CLINIC | Age: 57
End: 2022-01-03

## 2022-01-03 DIAGNOSIS — F31.4 BIPOLAR DISORDER, CURRENT EPISODE DEPRESSED, SEVERE, WITHOUT PSYCHOTIC FEATURES (HCC): ICD-10-CM

## 2022-01-03 DIAGNOSIS — F31.9 BIPOLAR I DISORDER WITH ANXIOUS DISTRESS (HCC): ICD-10-CM

## 2022-01-03 RX ORDER — TRAZODONE HYDROCHLORIDE 100 MG/1
200 TABLET ORAL
Qty: 60 TABLET | Refills: 2 | Status: SHIPPED | OUTPATIENT
Start: 2022-01-03 | End: 2022-05-02

## 2022-01-03 RX ORDER — ARIPIPRAZOLE 5 MG/1
5 TABLET ORAL DAILY
Qty: 30 TABLET | Refills: 2 | Status: SHIPPED | OUTPATIENT
Start: 2022-01-03 | End: 2022-01-04

## 2022-01-03 RX ORDER — DULOXETIN HYDROCHLORIDE 30 MG/1
30 CAPSULE, DELAYED RELEASE ORAL DAILY
Qty: 30 CAPSULE | Refills: 2 | Status: SHIPPED | OUTPATIENT
Start: 2022-01-03 | End: 2022-07-20 | Stop reason: SDUPTHER

## 2022-01-03 NOTE — TELEPHONE ENCOUNTER
Pt called and stated that she is in need of the following medication ,, she stated that she is unable to physc until 2 months,,    metFORMIN (GLUCOPHAGE) 1000 MG tablet    clonazePAM (KlonoPIN) 0 5 mg tablet  buPROPion (WELLBUTRIN SR) 150 mg 12 hr tablet  DULoxetine (CYMBALTA) 30 mg delayed release capsule  ARIPiprazole (ABILIFY) 5 mg tablet     traZODone (DESYREL) 100 mg tablet  Pt stated that the pharmacy only gave her 100 mg 1 daily #30 on 12/9   I called pharmacy and spoke to [facility] she stated that she received 100mg 1 bid #60 on 12-9-21

## 2022-01-06 ENCOUNTER — TELEMEDICINE (OUTPATIENT)
Dept: FAMILY MEDICINE CLINIC | Facility: CLINIC | Age: 57
End: 2022-01-06
Payer: COMMERCIAL

## 2022-01-06 VITALS — WEIGHT: 203 LBS | HEIGHT: 63 IN | BODY MASS INDEX: 35.97 KG/M2

## 2022-01-06 DIAGNOSIS — E66.9 DIABETES MELLITUS TYPE 2 IN OBESE (HCC): Primary | ICD-10-CM

## 2022-01-06 DIAGNOSIS — F41.1 GENERALIZED ANXIETY DISORDER: Chronic | ICD-10-CM

## 2022-01-06 DIAGNOSIS — F31.9 BIPOLAR I DISORDER WITH ANXIOUS DISTRESS (HCC): ICD-10-CM

## 2022-01-06 DIAGNOSIS — E11.69 DIABETES MELLITUS TYPE 2 IN OBESE (HCC): Primary | ICD-10-CM

## 2022-01-06 PROCEDURE — 3008F BODY MASS INDEX DOCD: CPT | Performed by: INTERNAL MEDICINE

## 2022-01-06 PROCEDURE — 99443 PR PHYS/QHP TELEPHONE EVALUATION 21-30 MIN: CPT | Performed by: INTERNAL MEDICINE

## 2022-01-06 RX ORDER — LANCETS
EACH MISCELLANEOUS
COMMUNITY
Start: 2022-01-03

## 2022-01-06 RX ORDER — INSULIN HUMAN 100 [IU]/ML
15 INJECTION, SUSPENSION SUBCUTANEOUS
Qty: 15 ML | Refills: 5 | Status: SHIPPED | OUTPATIENT
Start: 2022-01-06 | End: 2022-04-04

## 2022-01-06 RX ORDER — CLONAZEPAM 0.5 MG/1
0.5 TABLET ORAL 2 TIMES DAILY PRN
Qty: 60 TABLET | Refills: 1 | Status: SHIPPED | OUTPATIENT
Start: 2022-01-06 | End: 2022-03-15

## 2022-01-06 NOTE — ASSESSMENT & PLAN NOTE
Lab Results   Component Value Date    HGBA1C 8 6 (H) 07/01/2021   Blood sugar up due to she does not have any Basaglar due to she cannot afford it  I will order NPH insulin 15 units twice a day  Advised patient to call me back if this insulin is not covered either  She refused to go to emergency room

## 2022-01-06 NOTE — PROGRESS NOTES
Virtual Regular Visit    Verification of patient location:    Patient is located in the following state in which I hold an active license [place]      Assessment/Plan:    Problem List Items Addressed This Visit        Endocrine    Diabetes mellitus type 2 in obese [facility]) - Primary       Lab Results   Component Value Date    HGBA1C 8 6 (H) 07/01/2021   Blood sugar up due to she does not have any Basaglar due to she cannot afford it  I will order NPH insulin 15 units twice a day  Advised patient to call me back if this insulin is not covered either  She refused to go to emergency room  Relevant Medications    insulin isophane (HumuLIN N KwikPen) 100 units/mL injection pen       Other    Generalized anxiety disorder (Chronic)     Patient's current psychiatrist does not accept her insurance anymore  Therefore she cannot get clonazepam from them anymore  She is looking for a new psychiatrist   There is at least 2 months of waiting to see a new psychiatrist   I will order temporary supply of Klonopin for her  She needs it for anxiety and palpitations  PDMP reviewed  No suicidal or homicidal ideations           Bipolar I disorder with anxious distress ([address] )    Relevant Medications    clonazePAM (KlonoPIN) 0 5 mg tablet               Reason for visit is   Chief Complaint   Patient presents with    Heart Problem     heart rate is elevated 130(has machine at home) feels like someone scared her heart beating fast     Diabetes     sugar is 347    Virtual Regular Visit        Encounter provider Cristy Khan MD    Provider located at [address] [place] [id number] [phone]      Recent Visits  Date Type Provider Dept   01/03/22 Telephone Marcel Aviles MD [address] recent visits within past 7 days and meeting all other requirements  Today's Visits  Date Type Provider Dept   01/06/22 Telemedicine Shazia Devine David Nunez MD  [facility] [place]   Showing today's visits and meeting all other requirements  Future Appointments  No visits were found meeting these conditions  Showing future appointments within next 150 days and meeting all other requirements       The patient was identified by name and date of birth  Chaya Campbell was informed that this is a telemedicine visit and that the visit is being conducted through Telephone  My office door was closed  No one else was in the room  She acknowledged consent and understanding of privacy and security of the video platform  The patient has agreed to participate and understands they can discontinue the visit at any time  Patient is aware this is a billable service  Subjective  Chaya Campbell is a 49 y o  female patient unable to come in due to car door handle james  Has history of palpitations for years  Unable to see  psychiatrist right away due to moved to [place] recently  Has increased anxiety off and on  Unable to afford basaglar- out of it  No fever  No chest pain/dyspnea  Tolerating lisinopril/hctz well         HPI     Past Medical History:   Diagnosis Date    Addiction [facility] drug [facility] )     Adjustment disorder     Alcohol abuse     Alcoholism ([facility] )     Anxiety     Bipolar disorder (HCC)     Bowel obstruction ([address] )     Depression     Diabetes type 2, controlled ([address] )     Disease of thyroid gland     Gastritis     Head injury     Heart palpitations     Hyperlipidemia     Hypertension     Hypothyroidism     Psychiatric illness     PTSD (post-traumatic stress disorder)     Seizure ([address] )     Seizures ([address] )     Sleep difficulties     Substance abuse ([address] )     Suicide attempt ([address] )     Withdrawal symptoms, drug or narcotic ([facility] )        Past Surgical History:   Procedure Laterality Date    ABDOMINAL SURGERY      APPENDECTOMY      BOWEL RESECTION      CHOLECYSTECTOMY      ESOPHAGOGASTRODUODENOSCOPY N/A 5/18/2018    Procedure: ESOPHAGOGASTRODUODENOSCOPY (EGD) with bx;  Surgeon: Donn Chaudhari DO;  Location: [facility];   Service: Gastroenterology    HYSTERECTOMY      KNEE SURGERY Bilateral        Current Outpatient Medications   Medication Sig Dispense Refill    amLODIPine (NORVASC) 5 mg tablet Take 1 tablet (5 mg total) by mouth daily 30 tablet 0    ARIPiprazole (ABILIFY) 5 mg tablet TAKE 1 TABLET(5 MG) BY MOUTH DAILY 90 tablet 1    atorvastatin (LIPITOR) 10 mg tablet Take 1 tablet (10 mg total) by mouth daily with dinner 30 tablet 0    benztropine (COGENTIN) 1 mg tablet Take 1 mg by mouth daily      buPROPion (WELLBUTRIN SR) 150 mg 12 hr tablet       clonazePAM (KlonoPIN) 0 5 mg tablet Take 1 tablet (0 5 mg total) by mouth 2 (two) times a day as needed for seizures 60 tablet 1    DULoxetine (CYMBALTA) 30 mg delayed release capsule Take 1 capsule (30 mg total) by mouth daily 30 capsule 2    ergocalciferol (VITAMIN D2) 50,000 units TAKE 1 CAPSULE BY MOUTH 1 TIME A WEEK 12 capsule 1    gabapentin (NEURONTIN) 800 mg tablet Take 1 tablet (800 mg total) by mouth 3 (three) times a day 90 tablet 2    hydrOXYzine HCL (ATARAX) 50 mg tablet Take 1 tablet (50 mg total) by mouth 2 (two) times a day as needed for anxiety 30 tablet 0    levothyroxine 25 mcg tablet Take 1 tablet (25 mcg total) by mouth daily in the early morning 7 tablet 0    lisinopril-hydrochlorothiazide (PRINZIDE,ZESTORETIC) 10-12 5 MG per tablet TAKE 1 TABLET BY MOUTH DAILY 90 tablet 1    metFORMIN (GLUCOPHAGE) 1000 MG tablet TAKE 1 TABLET(1000 MG) BY MOUTH TWICE DAILY WITH MEALS 180 tablet 1    metoprolol tartrate (LOPRESSOR) 50 mg tablet Take 1 tablet (50 mg total) by mouth every 12 (twelve) hours 60 tablet 0    traZODone (DESYREL) 100 mg tablet Take 2 tablets (200 mg total) by mouth daily at bedtime 60 tablet 2    atorvastatin (LIPITOR) 40 mg tablet  (Patient not taking: Reported on 1/6/2022 )      cloNIDine (CATAPRES) 0 1 mg tablet Take 1 tablet (0 1 mg total) by mouth daily at bedtime 30 tablet 1    folic acid (FOLVITE) 1 mg tablet Take by mouth daily (Patient not taking: Reported on 1/6/2022 )      gabapentin (NEURONTIN) 400 mg capsule Take 2 capsules (800 mg total) by mouth 3 (three) times a day (Patient not taking: Reported on 1/6/2022 ) 90 capsule 0    insulin glargine (Lantus SoloStar) 100 units/mL injection pen Inject 14 Units under the skin daily (Patient not taking: Reported on 1/6/2022 )      insulin isophane (HumuLIN N KwikPen) 100 units/mL injection pen Inject 15 Units under the skin 2 (two) times a day before meals 15 mL 5    Microlet Lancets MISC        No current facility-administered medications for this visit  Allergies   Allergen Reactions    Latex Rash       Review of Systems    Video Exam    Vitals:    01/06/22 1444   Weight: 92 1 kg (203 lb)   Height: 5' 2 5" (1 588 m)       Physical Exam     I spent 26 minutes directly with the patient during this visit    VIRTUAL VISIT DISCLAIMER      Bryson Trinh verbally agrees to participate in [employer]  Pt is aware that [employer] could be limited without vital signs or the ability to perform a full hands-on physical Ambar [place] understands she or the provider may request at any time to terminate the video visit and request the patient to seek care or treatment in person

## 2022-01-06 NOTE — ASSESSMENT & PLAN NOTE
Patient's current psychiatrist does not accept her insurance anymore  Therefore she cannot get clonazepam from them anymore  She is looking for a new psychiatrist   There is at least 2 months of waiting to see a new psychiatrist   I will order temporary supply of Klonopin for her  She needs it for anxiety and palpitations  PDMP reviewed  No suicidal or homicidal ideations

## 2022-01-18 ENCOUNTER — TELEPHONE (OUTPATIENT)
Dept: FAMILY MEDICINE CLINIC | Facility: CLINIC | Age: 57
End: 2022-01-18

## 2022-01-18 DIAGNOSIS — E11.69 DIABETES MELLITUS TYPE 2 IN OBESE (HCC): Primary | ICD-10-CM

## 2022-01-18 DIAGNOSIS — E66.9 DIABETES MELLITUS TYPE 2 IN OBESE (HCC): Primary | ICD-10-CM

## 2022-01-18 NOTE — TELEPHONE ENCOUNTER
Pt called and stated that she is in need of the following to be called into the [facility] pharmacy     She stated that she tests like 5 times a week not every day     CONTOUR NEXT EZ TEST STRIPS

## 2022-02-21 DIAGNOSIS — F31.9 BIPOLAR I DISORDER WITH ANXIOUS DISTRESS (HCC): ICD-10-CM

## 2022-02-21 RX ORDER — GABAPENTIN 800 MG/1
TABLET ORAL
Qty: 90 TABLET | Refills: 2 | Status: SHIPPED | OUTPATIENT
Start: 2022-02-21 | End: 2022-04-18

## 2022-03-03 DIAGNOSIS — F31.4 BIPOLAR DISORDER, CURRENT EPISODE DEPRESSED, SEVERE, WITHOUT PSYCHOTIC FEATURES (HCC): ICD-10-CM

## 2022-03-12 DIAGNOSIS — F31.9 BIPOLAR I DISORDER WITH ANXIOUS DISTRESS (HCC): ICD-10-CM

## 2022-03-15 RX ORDER — CLONAZEPAM 0.5 MG/1
TABLET ORAL
Qty: 60 TABLET | Refills: 1 | Status: SHIPPED | OUTPATIENT
Start: 2022-03-15 | End: 2022-05-10

## 2022-03-27 ENCOUNTER — APPOINTMENT (EMERGENCY)
Dept: RADIOLOGY | Facility: HOSPITAL | Age: 57
End: 2022-03-27
Payer: COMMERCIAL

## 2022-03-27 ENCOUNTER — HOSPITAL ENCOUNTER (EMERGENCY)
Facility: HOSPITAL | Age: 57
Discharge: HOME | End: 2022-03-27
Attending: EMERGENCY MEDICINE
Payer: COMMERCIAL

## 2022-03-27 VITALS
RESPIRATION RATE: 22 BRPM | DIASTOLIC BLOOD PRESSURE: 84 MMHG | TEMPERATURE: 98.8 F | SYSTOLIC BLOOD PRESSURE: 168 MMHG | OXYGEN SATURATION: 99 % | HEART RATE: 96 BPM

## 2022-03-27 DIAGNOSIS — F41.9 ANXIETY: ICD-10-CM

## 2022-03-27 DIAGNOSIS — E83.42 HYPOMAGNESEMIA: ICD-10-CM

## 2022-03-27 DIAGNOSIS — R00.2 PALPITATIONS: Primary | ICD-10-CM

## 2022-03-27 LAB
2HR DELTA HS TROPONIN: -1 NG/L
ALBUMIN SERPL BCP-MCNC: 4.2 G/DL (ref 3.5–5)
ALP SERPL-CCNC: 90 U/L (ref 34–104)
ALT SERPL W P-5'-P-CCNC: 27 U/L (ref 7–52)
ANION GAP SERPL CALCULATED.3IONS-SCNC: 11 MMOL/L (ref 4–13)
AST SERPL W P-5'-P-CCNC: 16 U/L (ref 13–39)
BASOPHILS # BLD AUTO: 0.03 THOUSANDS/ΜL (ref 0–0.1)
BASOPHILS NFR BLD AUTO: 0 % (ref 0–1)
BILIRUB SERPL-MCNC: 0.46 MG/DL (ref 0.2–1)
BUN SERPL-MCNC: 13 MG/DL (ref 5–25)
CALCIUM SERPL-MCNC: 9.5 MG/DL (ref 8.4–10.2)
CARDIAC TROPONIN I PNL SERPL HS: 4 NG/L
CARDIAC TROPONIN I PNL SERPL HS: 5 NG/L
CHLORIDE SERPL-SCNC: 93 MMOL/L (ref 96–108)
CO2 SERPL-SCNC: 27 MMOL/L (ref 21–32)
CREAT SERPL-MCNC: 0.68 MG/DL (ref 0.6–1.3)
EOSINOPHIL # BLD AUTO: 0.1 THOUSAND/ΜL (ref 0–0.61)
EOSINOPHIL NFR BLD AUTO: 1 % (ref 0–6)
ERYTHROCYTE [DISTWIDTH] IN BLOOD BY AUTOMATED COUNT: 14 % (ref 11.6–15.1)
GFR SERPL CREATININE-BSD FRML MDRD: 99 ML/MIN/1.73SQ M
GLUCOSE SERPL-MCNC: 283 MG/DL (ref 65–140)
HCT VFR BLD AUTO: 43 % (ref 34.8–46.1)
HGB BLD-MCNC: 14.3 G/DL (ref 11.5–15.4)
IMM GRANULOCYTES # BLD AUTO: 0.1 THOUSAND/UL (ref 0–0.2)
IMM GRANULOCYTES NFR BLD AUTO: 1 % (ref 0–2)
LYMPHOCYTES # BLD AUTO: 4.1 THOUSANDS/ΜL (ref 0.6–4.47)
LYMPHOCYTES NFR BLD AUTO: 29 % (ref 14–44)
MAGNESIUM SERPL-MCNC: 1.6 MG/DL (ref 1.9–2.7)
MCH RBC QN AUTO: 29.4 PG (ref 26.8–34.3)
MCHC RBC AUTO-ENTMCNC: 33.3 G/DL (ref 31.4–37.4)
MCV RBC AUTO: 89 FL (ref 82–98)
MONOCYTES # BLD AUTO: 0.98 THOUSAND/ΜL (ref 0.17–1.22)
MONOCYTES NFR BLD AUTO: 7 % (ref 4–12)
NEUTROPHILS # BLD AUTO: 8.76 THOUSANDS/ΜL (ref 1.85–7.62)
NEUTS SEG NFR BLD AUTO: 62 % (ref 43–75)
NRBC BLD AUTO-RTO: 0 /100 WBCS
PLATELET # BLD AUTO: 403 THOUSANDS/UL (ref 149–390)
PMV BLD AUTO: 10.2 FL (ref 8.9–12.7)
POTASSIUM SERPL-SCNC: 3.8 MMOL/L (ref 3.5–5.3)
PROT SERPL-MCNC: 7.5 G/DL (ref 6.4–8.4)
RBC # BLD AUTO: 4.86 MILLION/UL (ref 3.81–5.12)
SODIUM SERPL-SCNC: 131 MMOL/L (ref 135–147)
TSH SERPL DL<=0.05 MIU/L-ACNC: 2.05 UIU/ML (ref 0.45–5.33)
WBC # BLD AUTO: 14.07 THOUSAND/UL (ref 4.31–10.16)

## 2022-03-27 PROCEDURE — 85025 COMPLETE CBC W/AUTO DIFF WBC: CPT | Performed by: EMERGENCY MEDICINE

## 2022-03-27 PROCEDURE — 83735 ASSAY OF MAGNESIUM: CPT | Performed by: EMERGENCY MEDICINE

## 2022-03-27 PROCEDURE — 36415 COLL VENOUS BLD VENIPUNCTURE: CPT | Performed by: EMERGENCY MEDICINE

## 2022-03-27 PROCEDURE — 99285 EMERGENCY DEPT VISIT HI MDM: CPT

## 2022-03-27 PROCEDURE — 96365 THER/PROPH/DIAG IV INF INIT: CPT

## 2022-03-27 PROCEDURE — 71045 X-RAY EXAM CHEST 1 VIEW: CPT

## 2022-03-27 PROCEDURE — 96375 TX/PRO/DX INJ NEW DRUG ADDON: CPT

## 2022-03-27 PROCEDURE — 80053 COMPREHEN METABOLIC PANEL: CPT | Performed by: EMERGENCY MEDICINE

## 2022-03-27 PROCEDURE — 84484 ASSAY OF TROPONIN QUANT: CPT | Performed by: EMERGENCY MEDICINE

## 2022-03-27 PROCEDURE — 93005 ELECTROCARDIOGRAM TRACING: CPT

## 2022-03-27 PROCEDURE — 99285 EMERGENCY DEPT VISIT HI MDM: CPT | Performed by: EMERGENCY MEDICINE

## 2022-03-27 PROCEDURE — 84443 ASSAY THYROID STIM HORMONE: CPT | Performed by: EMERGENCY MEDICINE

## 2022-03-27 RX ORDER — MAGNESIUM SULFATE HEPTAHYDRATE 40 MG/ML
2 INJECTION, SOLUTION INTRAVENOUS ONCE
Status: COMPLETED | OUTPATIENT
Start: 2022-03-27 | End: 2022-03-27

## 2022-03-27 RX ORDER — LORAZEPAM 2 MG/ML
1 INJECTION INTRAMUSCULAR ONCE
Status: COMPLETED | OUTPATIENT
Start: 2022-03-27 | End: 2022-03-27

## 2022-03-27 RX ORDER — GABAPENTIN 400 MG/1
800 CAPSULE ORAL ONCE
Status: COMPLETED | OUTPATIENT
Start: 2022-03-27 | End: 2022-03-27

## 2022-03-27 RX ORDER — SODIUM CHLORIDE 9 MG/ML
3 INJECTION INTRAVENOUS
Status: DISCONTINUED | OUTPATIENT
Start: 2022-03-27 | End: 2022-03-27 | Stop reason: HOSPADM

## 2022-03-27 RX ADMIN — MAGNESIUM SULFATE HEPTAHYDRATE 2 G: 40 INJECTION, SOLUTION INTRAVENOUS at 17:18

## 2022-03-27 RX ADMIN — GABAPENTIN 800 MG: 400 CAPSULE ORAL at 18:07

## 2022-03-27 RX ADMIN — LORAZEPAM 1 MG: 2 INJECTION INTRAMUSCULAR; INTRAVENOUS at 16:15

## 2022-03-27 NOTE — ED PROVIDER NOTES
History  Chief Complaint   Patient presents with    Rapid Heart Rate     pt reports today stating she feels like her heart is racing, states she took two metoprolol with no relief  (pt takes metoprolol for hypertension)     72-year-old female with history of diabetes on insulin, hypertension, prior alcohol abuse, anxiety, bipolar disorder, hypothyroidism, presents to emergency department for heart palpitations  Patient says the last 2 hours she has felt like her heart is racing  She took 2 of her metoprolol doses without significant relief  She says she feels anxious as well  Has mild chest discomfort to the center of her chest, nonradiating, constant, no modifying factors  Also has mild shortness of breath  No leg pain or swelling, recent immobilization or surgery, hemoptysis, personal or family history of VTE, use of exogenous estrogen  Denies fever, chills, cough, n/v/d, diaphoresis, focal neuro sx, abdominal pain, headache, any other complaints  Denies caffeine, drug, or ETOH use  Prior to Admission Medications   Prescriptions Last Dose Informant Patient Reported? Taking?    ARIPiprazole (ABILIFY) 5 mg tablet   No No   Sig: TAKE 1 TABLET(5 MG) BY MOUTH DAILY   DULoxetine (CYMBALTA) 30 mg delayed release capsule   No No   Sig: Take 1 capsule (30 mg total) by mouth daily   Microlet Lancets MISC   Yes No   amLODIPine (NORVASC) 5 mg tablet   No No   Sig: Take 1 tablet (5 mg total) by mouth daily   atorvastatin (LIPITOR) 10 mg tablet   No No   Sig: Take 1 tablet (10 mg total) by mouth daily with dinner   atorvastatin (LIPITOR) 40 mg tablet   Yes No   Patient not taking: Reported on 1/6/2022    benztropine (COGENTIN) 1 mg tablet   Yes No   Sig: Take 1 mg by mouth daily   buPROPion (WELLBUTRIN SR) 150 mg 12 hr tablet   Yes No   cloNIDine (CATAPRES) 0 1 mg tablet   No No   Sig: Take 1 tablet (0 1 mg total) by mouth daily at bedtime   clonazePAM (KlonoPIN) 0 5 mg tablet   No No   Sig: TAKE 1 TABLET(0 5 MG) BY MOUTH TWICE DAILY AS NEEDED FOR SEIZURES   ergocalciferol (VITAMIN D2) 50,000 units   No No   Sig: TAKE 1 CAPSULE BY MOUTH 1 TIME A WEEK   folic acid (FOLVITE) 1 mg tablet   Yes No   Sig: Take by mouth daily   Patient not taking: Reported on 1/6/2022    gabapentin (NEURONTIN) 400 mg capsule   No No   Sig: Take 2 capsules (800 mg total) by mouth 3 (three) times a day   Patient not taking: Reported on 1/6/2022    gabapentin (NEURONTIN) 800 mg tablet   No No   Sig: TAKE 1 TABLET(800 MG) BY MOUTH THREE TIMES DAILY   glucose blood test strip   No No   Sig: Check sugars 5 times a week   hydrOXYzine HCL (ATARAX) 50 mg tablet   No No   Sig: Take 1 tablet (50 mg total) by mouth 2 (two) times a day as needed for anxiety   insulin glargine (Lantus SoloStar) 100 units/mL injection pen   Yes No   Sig: Inject 14 Units under the skin daily   Patient not taking: Reported on 1/6/2022    insulin isophane (HumuLIN N KwikPen) 100 units/mL injection pen   No No   Sig: Inject 15 Units under the skin 2 (two) times a day before meals   levothyroxine 25 mcg tablet   No No   Sig: Take 1 tablet (25 mcg total) by mouth daily in the early morning   lisinopril-hydrochlorothiazide (PRINZIDE,ZESTORETIC) 10-12 5 MG per tablet   No No   Sig: TAKE 1 TABLET BY MOUTH DAILY   metFORMIN (GLUCOPHAGE) 1000 MG tablet   No No   Sig: TAKE 1 TABLET(1000 MG) BY MOUTH TWICE DAILY WITH MEALS   metoprolol tartrate (LOPRESSOR) 50 mg tablet   No No   Sig: Take 1 tablet (50 mg total) by mouth every 12 (twelve) hours   traZODone (DESYREL) 100 mg tablet   No No   Sig: Take 2 tablets (200 mg total) by mouth daily at bedtime      Facility-Administered Medications: None       Past Medical History:   Diagnosis Date    Addiction to drug ([place] [address] )     Adjustment disorder     Alcohol abuse     Alcoholism ([place] [address] )     Anxiety     Bipolar disorder (HCC)     Bowel obstruction ([place] [address] )     Depression     Diabetes type 2, controlled ([place] [address] )     Disease of thyroid gland     Gastritis     Head injury     Heart palpitations     Hyperlipidemia     Hypertension     Hypothyroidism     Psychiatric illness     PTSD (post-traumatic stress disorder)     Seizure ([place] [address] )     Seizures ([place] [address] )     Sleep difficulties     Substance abuse ([place] [address] )     Suicide attempt ([place] [address] )     Withdrawal symptoms, drug or narcotic ([place] [address] )        Past Surgical History:   Procedure Laterality Date    ABDOMINAL SURGERY      APPENDECTOMY      BOWEL RESECTION      CHOLECYSTECTOMY      ESOPHAGOGASTRODUODENOSCOPY N/A 5/18/2018    Procedure: ESOPHAGOGASTRODUODENOSCOPY (EGD) with bx;  Surgeon: Temi Gonzalez DO;  Location: [facility]; Service: Gastroenterology    HYSTERECTOMY      KNEE SURGERY Bilateral        Family History   Problem Relation Age of Onset    Diabetes Father     Bipolar disorder Mother      I have reviewed and agree with the history as documented  E-Cigarette/Vaping    E-Cigarette Use Never User      E-Cigarette/Vaping Substances    Nicotine Yes     THC No     CBD No     Flavoring No     Other No     Unknown No      Social History     Tobacco Use    Smoking status: Current Every Day Smoker     Packs/day: 0 50     Years: 25 00     Pack years: 12 50     Types: Cigarettes    Smokeless tobacco: Never Used   Vaping Use    Vaping Use: Never used   Substance Use Topics    Alcohol use: Not Currently     Alcohol/week: 6 0 standard drinks     Types: 6 Cans of beer per week     Comment: last drink on 08/15/20    Drug use: No       Review of Systems   Constitutional: Negative  Negative for chills and fever  HENT: Negative  Negative for rhinorrhea  Eyes: Negative  Respiratory: Positive for shortness of breath  Negative for cough  Cardiovascular: Positive for palpitations  Negative for chest pain and leg swelling  Gastrointestinal: Negative  Negative for abdominal pain, diarrhea, nausea and vomiting  Genitourinary: Negative  Negative for dysuria, flank pain and frequency  Musculoskeletal: Negative  Negative for back pain and neck pain  Skin: Negative  Negative for rash  Neurological: Negative  Negative for light-headedness and headaches  All other systems reviewed and are negative  Physical Exam  Physical Exam  Vitals and nursing note reviewed  Constitutional:       General: She is not in acute distress  Appearance: She is well-developed  HENT:      Head: Normocephalic and atraumatic  Mouth/Throat:      Mouth: Mucous membranes are moist    Eyes:      Pupils: Pupils are equal, round, and reactive to light  Cardiovascular:      Rate and Rhythm: Normal rate and regular rhythm  Pulses:           Radial pulses are 2+ on the right side and 2+ on the left side  Heart sounds: Normal heart sounds  No murmur heard  No friction rub  No gallop  Pulmonary:      Effort: Pulmonary effort is normal  No respiratory distress  Breath sounds: Normal breath sounds  No stridor  No wheezing, rhonchi or rales  Abdominal:      Palpations: Abdomen is soft  Tenderness: There is no abdominal tenderness  There is no guarding or rebound  Musculoskeletal:         General: No swelling or tenderness  Normal range of motion  Cervical back: Normal range of motion and neck supple  Right lower leg: No edema  Left lower leg: No edema  Skin:     General: Skin is warm and dry  Capillary Refill: Capillary refill takes less than 2 seconds  Neurological:      Mental Status: She is alert and oriented to person, place, and time  Cranial Nerves: No cranial nerve deficit  Comments: Clear fluent speech   Psychiatric:         Mood and Affect: Mood is anxious           Vital Signs  ED Triage Vitals   Temperature Pulse Respirations Blood Pressure SpO2   03/27/22 1548 03/27/22 1547 03/27/22 1548 03/27/22 1549 03/27/22 1548   98 8 °F (37 1 °C) 101 (!) 35 (!) 174/86 99 %      Temp Source Heart Rate Source Patient Position - Orthostatic VS BP Location FiO2 (%)   03/27/22 1548 03/27/22 1547 03/27/22 1839 03/27/22 1839 --   Oral Monitor Lying Right arm       Pain Score       --                  Vitals:    03/27/22 1547 03/27/22 1549 03/27/22 1839   BP:  (!) 174/86 168/84   Pulse: 101  96   Patient Position - Orthostatic VS:   Lying         Visual Acuity      ED Medications  Medications   sodium chloride (PF) 0 9 % injection 3 mL (has no administration in time range)   LORazepam (ATIVAN) injection 1 mg (1 mg Intravenous Given 3/27/22 1615)   magnesium sulfate 2 g/50 mL IVPB (premix) 2 g (0 g Intravenous Stopped 3/27/22 1838)   gabapentin (NEURONTIN) capsule 800 mg (800 mg Oral Given 3/27/22 1807)       Diagnostic Studies  Results Reviewed     Procedure Component Value Units Date/Time    HS Troponin I 2hr [[id number]]  (Normal) Collected: 03/27/22 1809    Lab Status: Final result Specimen: Blood from Arm, Left Updated: 03/27/22 1838     hs TnI 2hr 4 ng/L      Delta 2hr hsTnI -1 ng/L     TSH, 3rd generation with Free T4 reflex [[id number]]  (Normal) Collected: 03/27/22 1611    Lab Status: Final result Specimen: Blood from Arm, Left Updated: 03/27/22 1654     TSH 3RD GENERATON 2 046 uIU/mL     Narrative:      Patients undergoing fluorescein dye angiography may retain small amounts of fluorescein in the body for 48-72 hours post procedure  Samples containing fluorescein can produce falsely depressed TSH values  If the patient had this procedure,a specimen should be resubmitted post fluorescein clearance        HS Troponin 0hr (reflex protocol) [[id number]]  (Normal) Collected: 03/27/22 1611    Lab Status: Final result Specimen: Blood from Arm, Left Updated: 03/27/22 1645     hs TnI 0hr 5 ng/L     Comprehensive metabolic panel [[id number]]  (Abnormal) Collected: 03/27/22 1611    Lab Status: Final result Specimen: Blood from Arm, Left Updated: 03/27/22 1640     Sodium 131 mmol/L      Potassium 3 8 mmol/L      Chloride 93 mmol/L      CO2 27 mmol/L      ANION GAP 11 mmol/L      BUN 13 mg/dL      Creatinine 0 68 mg/dL      Glucose 283 mg/dL      Calcium 9 5 mg/dL      AST 16 U/L      ALT 27 U/L      Alkaline Phosphatase 90 U/L      Total Protein 7 5 g/dL      Albumin 4 2 g/dL      Total Bilirubin 0 46 mg/dL      eGFR 99 ml/min/1 73sq m     Narrative:      National Kidney Disease Foundation guidelines for Chronic Kidney Disease (CKD):     Stage 1 with normal or high GFR (GFR > 90 mL/min/1 73 square meters)    Stage 2 Mild CKD (GFR = 60-89 mL/min/1 73 square meters)    Stage 3A Moderate CKD (GFR = 45-59 mL/min/1 73 square meters)    Stage 3B Moderate CKD (GFR = 30-44 mL/min/1 73 square meters)    Stage 4 Severe CKD (GFR = 15-29 mL/min/1 73 square meters)    Stage 5 End Stage CKD (GFR <15 mL/min/1 73 square meters)  Note: GFR calculation is accurate only with a steady state creatinine    Magnesium [[id number]]  (Abnormal) Collected: 03/27/22 1611    Lab Status: Final result Specimen: Blood from Arm, Left Updated: 03/27/22 1640     Magnesium 1 6 mg/dL     CBC and differential [[id number]]  (Abnormal) Collected: 03/27/22 1611    Lab Status: Final result Specimen: Blood from Arm, Left Updated: 03/27/22 1622     WBC 14 07 Thousand/uL      RBC 4 86 Million/uL      Hemoglobin 14 3 g/dL      Hematocrit 43 0 %      MCV 89 fL      MCH 29 4 pg      MCHC 33 3 g/dL      RDW 14 0 %      MPV 10 2 fL      Platelets 980 Thousands/uL      nRBC 0 /100 WBCs      Neutrophils Relative 62 %      Immat GRANS % 1 %      Lymphocytes Relative 29 %      Monocytes Relative 7 %      Eosinophils Relative 1 %      Basophils Relative 0 %      Neutrophils Absolute 8 76 Thousands/µL      Immature Grans Absolute 0 10 Thousand/uL      Lymphocytes Absolute 4 10 Thousands/µL      Monocytes Absolute 0 98 Thousand/µL      Eosinophils Absolute 0 10 Thousand/µL      Basophils Absolute 0 03 Thousands/µL                  X-ray chest 1 view portable   ED Interpretation by Lorin Chan MD (03/27 1632)   No acute cardiopulmonary disease                 Procedures  Procedures         ED Course  ED Course as of 03/27/22 1900   Surendra Jackson Mar 27, 2022   1607 Procedure Note: EKG  Date/Time: 03/27/22 4:07 PM   Interpreted by: Shannon Giron  Indications / Diagnosis: Palpitations  ECG reviewed by me, the ED Provider: yes   The EKG demonstrates:  Rate: 97  Rhythm: normal sinus  Intervals: Qtc 464, otherwise normal intervals  Axis: normal axis  QRS/Blocks: normal QRS  ST Changes: No acute ST Changes, no STD/ZAIRA                               SBIRT 20yo+      Most Recent Value   SBIRT (23 yo +)    In order to provide better care to our patients, we are screening all of our patients for alcohol and drug use  Would it be okay to ask you these screening questions? Yes Filed at: 03/27/2022 1557   Initial Alcohol Screen: US AUDIT-C     1  How often do you have a drink containing alcohol? 0 Filed at: 03/27/2022 1557   2  How many drinks containing alcohol do you have on a typical day you are drinking? 0 Filed at: 03/27/2022 1557   3a  Male UNDER 65: How often do you have five or more drinks on one occasion? 0 Filed at: 03/27/2022 1557   3b  FEMALE Any Age, or MALE 65+: How often do you have 4 or more drinks on one occassion? 0 Filed at: 03/27/2022 1557   Audit-C Score 0 Filed at: 03/27/2022 1557   MUMTAZ: How many times in the past year have you    Used an illegal drug or used a prescription medication for non-medical reasons? Never Filed at: 03/27/2022 1557                    MDM  Number of Diagnoses or Management Options  Anxiety  Hypomagnesemia  Palpitations  Diagnosis management comments: 82-year-old female with history of diabetes on insulin, hypertension, prior alcohol abuse, anxiety, bipolar disorder, hypothyroidism, presents to emergency department for heart palpitations  Patient appears anxious on exam   Within the differential consider arrhythmia, hyperthyroidism, drug effect    She also has mild chest discomfort and has cardiac risk factors  Obtain cardiac panel to further evaluate and give Ativan for anxiety  Final assessment:  Workup is remarkable for magnesium of 1 6, which was repleted  Initial troponin was 5 with a delta of -1  Remaining workup reassuring  Patient asymptomatic on exam   She is comfortable returning home at this time  Sending home with prescription for magnesium supplement and advised she follow up with her primary care doctor within the next few days for re-evaluation  She says she has a cardiology referral, which she has not seen yet, but plans to call to make appointment tomorrow  Strict ED return precautions provided should symptoms worsen and patient can otherwise follow up outpatient  Patient expresses an understanding and agreement with the plan and remains in good condition for discharge  Disposition  Final diagnoses:   Palpitations   Anxiety   Hypomagnesemia     Time reflects when diagnosis was documented in both MDM as applicable and the Disposition within this note     Time User Action Codes Description Comment    3/27/2022  6:46 PM Minor, Shannon Add [R00 2] Palpitations     3/27/2022  6:46 PM Minor, Shannon Add [F41 9] Anxiety     3/27/2022  6:47 PM Minor, Shannon Add [E83 42] Hypomagnesemia       ED Disposition     ED Disposition Condition Date/Time Comment    Discharge Good Sun Mar 27, 2022  6:46 PM Rome Henriquez discharge to home/self care              Follow-up Information     Follow up With Specialties Details Why Contact Info Additional [address]  Emergency Department Emergency Medicine Go to  If symptoms worsen [address] [facility],[address] [id number]  [address] [facility] Emergency Department, [address], [address] [facility] Manfred Garza MD Internal Medicine Call in 1 day  [address] [facility]   [phone]             Discharge Medication List as of 3/27/2022  6:50 PM      START taking these medications    Details   magnesium oxide (MAG-OX) 400 mg Take 1 tablet (400 mg total) by mouth daily for 14 days, Starting Sun 3/27/2022, Until Sun 4/10/2022, Normal         CONTINUE these medications which have NOT CHANGED    Details   amLODIPine (NORVASC) 5 mg tablet Take 1 tablet (5 mg total) by mouth daily, Starting Sat 9/4/2021, Normal      ARIPiprazole (ABILIFY) 5 mg tablet TAKE 1 TABLET(5 MG) BY MOUTH DAILY, Normal      !! atorvastatin (LIPITOR) 10 mg tablet Take 1 tablet (10 mg total) by mouth daily with dinner, Starting Fri 9/3/2021, Normal      !! atorvastatin (LIPITOR) 40 mg tablet Starting Sat 10/16/2021, Historical Med      benztropine (COGENTIN) 1 mg tablet Take 1 mg by mouth daily, Starting Fri 7/23/2021, Historical Med      buPROPion (WELLBUTRIN SR) 150 mg 12 hr tablet Starting Sun 11/14/2021, Historical Med      clonazePAM (KlonoPIN) 0 5 mg tablet TAKE 1 TABLET(0 5 MG) BY MOUTH TWICE DAILY AS NEEDED FOR SEIZURES, Normal      cloNIDine (CATAPRES) 0 1 mg tablet Take 1 tablet (0 1 mg total) by mouth daily at bedtime, Starting Mon 10/12/2020, Print      DULoxetine (CYMBALTA) 30 mg delayed release capsule Take 1 capsule (30 mg total) by mouth daily, Starting Mon 1/3/2022, Normal      ergocalciferol (VITAMIN D2) 50,000 units TAKE 1 CAPSULE BY MOUTH 1 TIME A WEEK, Normal      folic acid (FOLVITE) 1 mg tablet Take by mouth daily, Historical Med      gabapentin (NEURONTIN) 400 mg capsule Take 2 capsules (800 mg total) by mouth 3 (three) times a day, Starting Wed 3/31/2021, Normal      gabapentin (NEURONTIN) 800 mg tablet TAKE 1 TABLET(800 MG) BY MOUTH THREE TIMES DAILY, Normal      glucose blood test strip Check sugars 5 times a week, Normal      hydrOXYzine HCL (ATARAX) 50 mg tablet Take 1 tablet (50 mg total) by mouth 2 (two) times a day as needed for anxiety, Starting Wed 3/31/2021, Normal      insulin glargine (Lantus SoloStar) 100 units/mL injection pen Inject 14 Units under the skin daily, Starting Fri 7/2/2021, Historical Med      insulin isophane (HumuLIN N KwikPen) 100 units/mL injection pen Inject 15 Units under the skin 2 (two) times a day before meals, Starting Thu 1/6/2022, Normal      levothyroxine 25 mcg tablet Take 1 tablet (25 mcg total) by mouth daily in the early morning, Starting Tue 10/13/2020, Print      lisinopril-hydrochlorothiazide (PRINZIDE,ZESTORETIC) 10-12 5 MG per tablet TAKE 1 TABLET BY MOUTH DAILY, Starting Thu 12/9/2021, Normal      metFORMIN (GLUCOPHAGE) 1000 MG tablet TAKE 1 TABLET(1000 MG) BY MOUTH TWICE DAILY WITH MEALS, Normal      metoprolol tartrate (LOPRESSOR) 50 mg tablet Take 1 tablet (50 mg total) by mouth every 12 (twelve) hours, Starting Wed 3/31/2021, Normal      Microlet Lancets MISC Starting Mon 1/3/2022, Historical Med      traZODone (DESYREL) 100 mg tablet Take 2 tablets (200 mg total) by mouth daily at bedtime, Starting Mon 1/3/2022, Normal       !! - Potential duplicate medications found  Please discuss with provider  No discharge procedures on file      PDMP Review       Value Time User    PDMP Reviewed  Yes 1/6/2022  3:15 PM Laureen Black MD          ED Provider  Electronically Signed by           Chloé Tinoco MD  03/27/22 9120

## 2022-03-28 ENCOUNTER — OFFICE VISIT (OUTPATIENT)
Dept: FAMILY MEDICINE CLINIC | Facility: CLINIC | Age: 57
End: 2022-03-28
Payer: COMMERCIAL

## 2022-03-28 ENCOUNTER — APPOINTMENT (EMERGENCY)
Dept: CT IMAGING | Facility: HOSPITAL | Age: 57
End: 2022-03-28
Payer: COMMERCIAL

## 2022-03-28 ENCOUNTER — HOSPITAL ENCOUNTER (EMERGENCY)
Facility: HOSPITAL | Age: 57
Discharge: HOME | End: 2022-03-28
Attending: EMERGENCY MEDICINE | Admitting: EMERGENCY MEDICINE
Payer: COMMERCIAL

## 2022-03-28 ENCOUNTER — TELEPHONE (OUTPATIENT)
Dept: CT IMAGING | Facility: HOSPITAL | Age: 57
End: 2022-03-28

## 2022-03-28 VITALS
DIASTOLIC BLOOD PRESSURE: 57 MMHG | TEMPERATURE: 98.6 F | RESPIRATION RATE: 20 BRPM | SYSTOLIC BLOOD PRESSURE: 123 MMHG | OXYGEN SATURATION: 95 % | HEART RATE: 98 BPM

## 2022-03-28 VITALS
DIASTOLIC BLOOD PRESSURE: 100 MMHG | HEART RATE: 102 BPM | TEMPERATURE: 97 F | BODY MASS INDEX: 35.97 KG/M2 | HEIGHT: 63 IN | WEIGHT: 203 LBS | RESPIRATION RATE: 16 BRPM | SYSTOLIC BLOOD PRESSURE: 166 MMHG | OXYGEN SATURATION: 97 %

## 2022-03-28 DIAGNOSIS — R00.0 TACHYCARDIA: ICD-10-CM

## 2022-03-28 DIAGNOSIS — R07.9 ACUTE CHEST PAIN: Primary | ICD-10-CM

## 2022-03-28 DIAGNOSIS — I16.0 HYPERTENSIVE URGENCY: ICD-10-CM

## 2022-03-28 DIAGNOSIS — R06.82 TACHYPNEA: Primary | ICD-10-CM

## 2022-03-28 LAB
2HR DELTA HS TROPONIN: 0 NG/L
ALBUMIN SERPL BCP-MCNC: 3.8 G/DL (ref 3.5–5)
ALP SERPL-CCNC: 109 U/L (ref 46–116)
ALT SERPL W P-5'-P-CCNC: 48 U/L (ref 12–78)
ANION GAP SERPL CALCULATED.3IONS-SCNC: 13 MMOL/L (ref 4–13)
AST SERPL W P-5'-P-CCNC: 23 U/L (ref 5–45)
ATRIAL RATE: 97 BPM
BASOPHILS # BLD AUTO: 0.04 THOUSANDS/ΜL (ref 0–0.1)
BASOPHILS NFR BLD AUTO: 0 % (ref 0–1)
BILIRUB SERPL-MCNC: 0.45 MG/DL (ref 0.2–1)
BUN SERPL-MCNC: 11 MG/DL (ref 5–25)
CALCIUM SERPL-MCNC: 9.4 MG/DL (ref 8.3–10.1)
CARDIAC TROPONIN I PNL SERPL HS: 2 NG/L
CARDIAC TROPONIN I PNL SERPL HS: 2 NG/L
CHLORIDE SERPL-SCNC: 98 MMOL/L (ref 100–108)
CO2 SERPL-SCNC: 24 MMOL/L (ref 21–32)
CREAT SERPL-MCNC: 0.8 MG/DL (ref 0.6–1.3)
D DIMER PPP FEU-MCNC: 0.83 UG/ML FEU
EOSINOPHIL # BLD AUTO: 0.08 THOUSAND/ΜL (ref 0–0.61)
EOSINOPHIL NFR BLD AUTO: 1 % (ref 0–6)
ERYTHROCYTE [DISTWIDTH] IN BLOOD BY AUTOMATED COUNT: 14 % (ref 11.6–15.1)
GFR SERPL CREATININE-BSD FRML MDRD: 83 ML/MIN/1.73SQ M
GLUCOSE SERPL-MCNC: 339 MG/DL (ref 65–140)
HCT VFR BLD AUTO: 43.3 % (ref 34.8–46.1)
HGB BLD-MCNC: 14.3 G/DL (ref 11.5–15.4)
IMM GRANULOCYTES # BLD AUTO: 0.09 THOUSAND/UL (ref 0–0.2)
IMM GRANULOCYTES NFR BLD AUTO: 1 % (ref 0–2)
LYMPHOCYTES # BLD AUTO: 3.46 THOUSANDS/ΜL (ref 0.6–4.47)
LYMPHOCYTES NFR BLD AUTO: 28 % (ref 14–44)
MCH RBC QN AUTO: 29.9 PG (ref 26.8–34.3)
MCHC RBC AUTO-ENTMCNC: 33 G/DL (ref 31.4–37.4)
MCV RBC AUTO: 90 FL (ref 82–98)
MONOCYTES # BLD AUTO: 0.91 THOUSAND/ΜL (ref 0.17–1.22)
MONOCYTES NFR BLD AUTO: 7 % (ref 4–12)
NEUTROPHILS # BLD AUTO: 7.71 THOUSANDS/ΜL (ref 1.85–7.62)
NEUTS SEG NFR BLD AUTO: 63 % (ref 43–75)
NRBC BLD AUTO-RTO: 0 /100 WBCS
NT-PROBNP SERPL-MCNC: 46 PG/ML
P AXIS: 61 DEGREES
PLATELET # BLD AUTO: 365 THOUSANDS/UL (ref 149–390)
PMV BLD AUTO: 9.9 FL (ref 8.9–12.7)
POTASSIUM SERPL-SCNC: 3.8 MMOL/L (ref 3.5–5.3)
PR INTERVAL: 193 MS
PROT SERPL-MCNC: 7.8 G/DL (ref 6.4–8.2)
QRS AXIS: 45 DEGREES
QRSD INTERVAL: 76 MS
QT INTERVAL: 365 MS
QTC INTERVAL: 464 MS
RBC # BLD AUTO: 4.79 MILLION/UL (ref 3.81–5.12)
SODIUM SERPL-SCNC: 135 MMOL/L (ref 136–145)
T WAVE AXIS: 53 DEGREES
VENTRICULAR RATE: 97 BPM
WBC # BLD AUTO: 12.29 THOUSAND/UL (ref 4.31–10.16)

## 2022-03-28 PROCEDURE — 85379 FIBRIN DEGRADATION QUANT: CPT | Performed by: EMERGENCY MEDICINE

## 2022-03-28 PROCEDURE — 83880 ASSAY OF NATRIURETIC PEPTIDE: CPT | Performed by: EMERGENCY MEDICINE

## 2022-03-28 PROCEDURE — 99285 EMERGENCY DEPT VISIT HI MDM: CPT | Performed by: EMERGENCY MEDICINE

## 2022-03-28 PROCEDURE — 36415 COLL VENOUS BLD VENIPUNCTURE: CPT | Performed by: EMERGENCY MEDICINE

## 2022-03-28 PROCEDURE — 84484 ASSAY OF TROPONIN QUANT: CPT | Performed by: EMERGENCY MEDICINE

## 2022-03-28 PROCEDURE — 93005 ELECTROCARDIOGRAM TRACING: CPT

## 2022-03-28 PROCEDURE — 93010 ELECTROCARDIOGRAM REPORT: CPT | Performed by: INTERNAL MEDICINE

## 2022-03-28 PROCEDURE — 4004F PT TOBACCO SCREEN RCVD TLK: CPT | Performed by: INTERNAL MEDICINE

## 2022-03-28 PROCEDURE — G1004 CDSM NDSC: HCPCS

## 2022-03-28 PROCEDURE — 85025 COMPLETE CBC W/AUTO DIFF WBC: CPT | Performed by: EMERGENCY MEDICINE

## 2022-03-28 PROCEDURE — 99285 EMERGENCY DEPT VISIT HI MDM: CPT

## 2022-03-28 PROCEDURE — 80053 COMPREHEN METABOLIC PANEL: CPT | Performed by: EMERGENCY MEDICINE

## 2022-03-28 PROCEDURE — 99215 OFFICE O/P EST HI 40 MIN: CPT | Performed by: INTERNAL MEDICINE

## 2022-03-28 PROCEDURE — 71275 CT ANGIOGRAPHY CHEST: CPT

## 2022-03-28 RX ADMIN — IOHEXOL 85 ML: 350 INJECTION, SOLUTION INTRAVENOUS at 21:26

## 2022-03-28 NOTE — PROGRESS NOTES
Assessment/Plan:         Problem List Items Addressed This Visit        Cardiovascular and Mediastinum    Hypertensive urgency       Other    Tachypnea - Primary     Patient denies being anxious  Troponin/chest x ray  were normal yesterday in ER  Needs to be checked for any PE  bp elevated ?due to tachycardia  Send patient back to ER for pulmonary embolism work up  Patient to go to ER with her boy friend         Tachycardia     With tachypnea  Denies any increased anxiety  Check tsh once PE ruled out  Subjective:      Patient ID: Ally Hankins is a 49 y o  female  1  Dyspnea with palpitations  Patient was evaluated in emergency room yesterday with same complaint  Her troponin level was normal   She was given 2 doses of metoprolol without any significant improvement  No cough or hemoptysis  No fever or chills  No edema or orthopnea  No history of any blood clots in the past   She does not drink alcohol at present  She denies feeling any more anxious than her normal baseline  Does have a headache off and on  No tinnitus  No other neurological symptoms  Today she came to see me with feeling more worse  Repeat blood pressure is slightly down but she is still tachycardic and tachypneic  Oxygen saturation was okay  No depressed mood or significant mood changes        The following portions of the patient's history were reviewed and updated as appropriate:   Past Medical History:  She has a past medical history of Addiction to drug ([address] ), Adjustment disorder, Alcohol abuse, Alcoholism ([address] ), Anxiety, Bipolar disorder ([address] ), Bowel obstruction ([address] ), Depression, Diabetes type 2, controlled ([address] ), Disease of thyroid gland, Gastritis, Head injury, Heart palpitations, Hyperlipidemia, Hypertension, Hypothyroidism, Psychiatric illness, PTSD (post-traumatic stress disorder), Seizure ([address] ), Seizures ([address] ), Sleep difficulties, Substance abuse ([address] ), Suicide attempt ([address] ), and Withdrawal symptoms, drug or narcotic ([address] )  ,  _______________________________________________________________________  Medical Problems:  does not have any pertinent problems on file ,  _______________________________________________________________________  Past Surgical History:   has a past surgical history that includes Bowel resection; Abdominal surgery; Hysterectomy; Knee surgery (Bilateral); Esophagogastroduodenoscopy (N/A, 5/18/2018); Appendectomy; and Cholecystectomy  ,  _______________________________________________________________________  Family History:  family history includes Bipolar disorder in her mother; Diabetes in her father ,  _______________________________________________________________________  Social History:   reports that she has been smoking cigarettes  She has a 12 50 pack-year smoking history  She has never used smokeless tobacco  She reports previous alcohol use of about 6 0 standard drinks of alcohol per week  She reports that she does not use drugs  ,  _______________________________________________________________________  Allergies:  is allergic to latex     _______________________________________________________________________  Current Outpatient Medications   Medication Sig Dispense Refill    amLODIPine (NORVASC) 5 mg tablet Take 1 tablet (5 mg total) by mouth daily 30 tablet 0    ARIPiprazole (ABILIFY) 5 mg tablet TAKE 1 TABLET(5 MG) BY MOUTH DAILY 90 tablet 1    atorvastatin (LIPITOR) 10 mg tablet Take 1 tablet (10 mg total) by mouth daily with dinner 30 tablet 0    benztropine (COGENTIN) 1 mg tablet Take 1 mg by mouth daily      buPROPion (WELLBUTRIN SR) 150 mg 12 hr tablet       clonazePAM (KlonoPIN) 0 5 mg tablet TAKE 1 TABLET(0 5 MG) BY MOUTH TWICE DAILY AS NEEDED FOR SEIZURES 60 tablet 1    cloNIDine (CATAPRES) 0 1 mg tablet Take 1 tablet (0 1 mg total) by mouth daily at bedtime 30 tablet 1    DULoxetine (CYMBALTA) 30 mg delayed release capsule Take 1 capsule (30 mg total) by mouth daily 30 capsule 2    ergocalciferol (VITAMIN D2) 50,000 units TAKE 1 CAPSULE BY MOUTH 1 TIME A WEEK 12 capsule 1    gabapentin (NEURONTIN) 800 mg tablet TAKE 1 TABLET(800 MG) BY MOUTH THREE TIMES DAILY 90 tablet 2    glucose blood test strip Check sugars 5 times a week 100 strip 1    hydrOXYzine HCL (ATARAX) 50 mg tablet Take 1 tablet (50 mg total) by mouth 2 (two) times a day as needed for anxiety 30 tablet 0    insulin isophane (HumuLIN N KwikPen) 100 units/mL injection pen Inject 15 Units under the skin 2 (two) times a day before meals 15 mL 5    levothyroxine 25 mcg tablet Take 1 tablet (25 mcg total) by mouth daily in the early morning 7 tablet 0    lisinopril-hydrochlorothiazide (PRINZIDE,ZESTORETIC) 10-12 5 MG per tablet TAKE 1 TABLET BY MOUTH DAILY 90 tablet 1    magnesium oxide (MAG-OX) 400 mg Take 1 tablet (400 mg total) by mouth daily for 14 days 14 tablet 0    metFORMIN (GLUCOPHAGE) 1000 MG tablet TAKE 1 TABLET(1000 MG) BY MOUTH TWICE DAILY WITH MEALS 180 tablet 1    metoprolol tartrate (LOPRESSOR) 50 mg tablet Take 1 tablet (50 mg total) by mouth every 12 (twelve) hours 60 tablet 0    Microlet Lancets MISC       traZODone (DESYREL) 100 mg tablet Take 2 tablets (200 mg total) by mouth daily at bedtime 60 tablet 2    atorvastatin (LIPITOR) 40 mg tablet  (Patient not taking: Reported on 1/1/5953 )      folic acid (FOLVITE) 1 mg tablet Take by mouth daily (Patient not taking: Reported on 1/6/2022 )      gabapentin (NEURONTIN) 400 mg capsule Take 2 capsules (800 mg total) by mouth 3 (three) times a day (Patient not taking: Reported on 1/6/2022 ) 90 capsule 0    insulin glargine (Lantus SoloStar) 100 units/mL injection pen Inject 14 Units under the skin daily (Patient not taking: Reported on 1/6/2022 )       No current facility-administered medications for this visit      _______________________________________________________________________  Review of Systems      Objective:  Vitals: 03/28/22 1422 03/28/22 1442   BP: (!) 190/110 166/100   BP Location: Left arm    Patient Position: Sitting    Cuff Size: Large    Pulse: (!) 106 102   Resp: 16    Temp: (!) 97 °F (36 1 °C)    TempSrc: Temporal    SpO2: 97%    Weight: 92 1 kg (203 lb)    Height: 5' 2 5" (1 588 m)      Body mass index is 36 54 kg/m²  Physical Exam  Vitals and nursing note reviewed  Constitutional:       General: She is in acute distress (mild)  Appearance: She is well-developed  She is obese  She is diaphoretic  HENT:      Head: Atraumatic  Eyes:      General:         Right eye: No discharge  Left eye: No discharge  Neck:      Thyroid: No thyromegaly  Cardiovascular:      Rate and Rhythm: Regular rhythm  Tachycardia present  Heart sounds: Normal heart sounds  Pulmonary:      Breath sounds: Normal breath sounds  No wheezing  Chest:      Chest wall: No tenderness  Abdominal:      General: Bowel sounds are normal  There is no distension  Palpations: Abdomen is soft  Tenderness: There is no abdominal tenderness  Musculoskeletal:      Cervical back: Neck supple  Right lower leg: No edema  Left lower leg: No edema  Lymphadenopathy:      Cervical: No cervical adenopathy  Skin:     Findings: No erythema  Neurological:      Mental Status: She is alert and oriented to person, place, and time     Psychiatric:         Mood and Affect: Mood normal          Behavior: Behavior normal

## 2022-03-28 NOTE — ASSESSMENT & PLAN NOTE
Patient denies being anxious  Troponin/chest x ray  were normal yesterday in ER  Needs to be checked for any PE  bp elevated ?due to tachycardia  Send patient back to ER for pulmonary embolism work up     Patient to go to ER with her boy friend

## 2022-03-29 LAB
ATRIAL RATE: 112 BPM
P AXIS: 55 DEGREES
PR INTERVAL: 188 MS
QRS AXIS: 26 DEGREES
QRSD INTERVAL: 64 MS
QT INTERVAL: 340 MS
QTC INTERVAL: 454 MS
T WAVE AXIS: 34 DEGREES
VENTRICULAR RATE: 107 BPM

## 2022-03-29 PROCEDURE — 93010 ELECTROCARDIOGRAM REPORT: CPT | Performed by: INTERNAL MEDICINE

## 2022-03-30 NOTE — PROGRESS NOTES
Cardiology Consultation     Naman Keenan  [id number]  1965  [facility] [address] [address]    1  Sinus tachycardia  atorvastatin (LIPITOR) 20 mg tablet   2  Primary hypertension  Ambulatory Referral to Cardiology    metoprolol tartrate (LOPRESSOR) 100 mg tablet    Ambulatory Referral to Sleep Medicine    Echo complete w/ contrast if indicated    AMB extended holter monitor   3  Hyperlipidemia, unspecified hyperlipidemia type     4  Diabetes mellitus type 2 in obese ([facility] )     5  Obesity, morbid ([facility] )          On 3/27/22 Ms Naman Keenan presented to [facility] ED with rapid heart rate  She  Presented to the emergency room with complaints of 2 hours of  Feeling her heart racing  She took 2 of her metoprolol without relief admitted to anxiety,   Mild chest scan comfort and shortness of breath  In the emergency room heart rate 108 beats per minute blood pressure 174/86  Sodium 131 potassium 3 8 magnesium 1 6  She was treated with magnesium and Ativan  EKG ventricular rate 97 beats per minute normal sinus rhythm no ST T wave changes  Initial troponin 5 delta is -1  She was instructed to follow up with her PCP  On 3/28/22 Ms Bryant Coe presented to [address] [place] [address] with CP  Blood pressure in /94  Heart rate 109 beats per minute  EKG is sinus tachycardia without ST T wave abnormality  She was instructed follow-up cardiology  Ms Naman Keenan denies shortness of breath, Chest pain  She admits to chest tightness  Rach Magdaleno denies fever, chill, or cough  She admits to lightheadedness and dizziness with high BP   Rach Magdaleno is crying in the office stating she would like someone to help her        Medical History   DM2  HgbA1C 8 6 on 7/01/21  Hypertension  Hx of ETOH abuse  Anxiety   Bi Polar disorder  Hypothyroidism  9/02/21 , , HDL 48, LDL 80   Denies COVID, vaccinated x 1 with Nany [facility]     Family hx of CAD mother with CABG  And stenting, father with hx stents, brother with rhythm problems   ETOH use denies Use, last drank ETOH over one year ago   Tobacco use smoking 10 cigarettes a day   Denies Drug use     Patient Active Problem List   Diagnosis    Alcohol use disorder, moderate, dependence ([place] [address] )    Post-traumatic stress disorder, chronic    Generalized anxiety disorder    Bipolar disorder current episode depressed ([place] [address] )    Bipolar I disorder with anxious distress ([place] [address] )    Benzodiazepine dependence, continuous ([place] [address] )    Non compliance w medication regimen    Acquired hypothyroidism    Hypokalemia    Hyperlipemia    Transaminitis    [facility]    Tobacco abuse    Toxic encephalopathy    Gastroesophageal reflux disease without esophagitis    Increased anion gap metabolic acidosis    STI (sexually transmitted infection)    Chest pressure    Medicare annual wellness visit, initial    Diabetes mellitus type 2 in obese ([facility]  )    Primary hypertension    Hypothyroidism    Vitamin D deficiency    Tiredness    Obesity, morbid ([place] [address] )    Tachypnea    Hypertensive urgency    Tachycardia     Past Medical History:   Diagnosis Date    Addiction to drug ([place] [address] )     Adjustment disorder     Alcohol abuse     Alcoholism ([place] [address] )     Anxiety     [facility]  )     Bowel obstruction ([facility]  )     Depression     Diabetes type 2, controlled ([place] [address] )     Disease of thyroid gland     Gastritis     Head injury     Heart palpitations     Hyperlipidemia     Hypertension     Hypothyroidism     Psychiatric illness     PTSD (post-traumatic stress disorder)     Seizure ([facility] )     Seizures ([facility] )     Sleep difficulties     Substance abuse ([place] [address] )     Suicide attempt ([place] [address] )     Withdrawal symptoms, drug or narcotic ([place] [address] )      Social History     Socioeconomic History    Marital status:      Spouse name: Not on file    Number of children: Not on file    Years of education: Not on file    Highest education level: Not on file   Occupational History    Not on file   Tobacco Use    Smoking status: Current Every Day Smoker     Packs/day: 0 50     Years: 25 00     Pack years: 12 50     Types: Cigarettes    Smokeless tobacco: Never Used   Vaping Use    Vaping Use: Never used   Substance and Sexual Activity    Alcohol use: Not Currently     Alcohol/week: 6 0 standard drinks     Types: 6 Cans of beer per week     Comment: last drink on 08/15/20    Drug use: No    Sexual activity: Not Currently   Other Topics Concern    Not on file   Social History Narrative    Not on file     Social Determinants of Health     Financial Resource Strain: Not on file   Food Insecurity: Not on file   Transportation Needs: Not on file   Physical Activity: Not on file   Stress: Not on file   Social Connections: Not on file   Intimate Partner Violence: Not on file   Housing Stability: Not on file      Family History   Problem Relation Age of Onset    Diabetes Father     Bipolar disorder Mother      Past Surgical History:   Procedure Laterality Date    ABDOMINAL SURGERY      APPENDECTOMY      BOWEL RESECTION      CHOLECYSTECTOMY      ESOPHAGOGASTRODUODENOSCOPY N/A 5/18/2018    Procedure: ESOPHAGOGASTRODUODENOSCOPY (EGD) with bx;  Surgeon: Yaya Mosley DO;  Location: [facility];   Service: Gastroenterology    HYSTERECTOMY      KNEE SURGERY Bilateral        Current Outpatient Medications:     amLODIPine (NORVASC) 5 mg tablet, Take 1 tablet (5 mg total) by mouth daily, Disp: 30 tablet, Rfl: 0    ARIPiprazole (ABILIFY) 5 mg tablet, TAKE 1 TABLET(5 MG) BY MOUTH DAILY, Disp: 90 tablet, Rfl: 1    atorvastatin (LIPITOR) 10 mg tablet, Take 1 tablet (10 mg total) by mouth daily with dinner, Disp: 30 tablet, Rfl: 0    atorvastatin (LIPITOR) 40 mg tablet, , Disp: , Rfl:     benztropine (COGENTIN) 1 mg tablet, Take 1 mg by mouth daily, Disp: , Rfl:     buPROPion (WELLBUTRIN SR) 150 mg 12 hr tablet, , Disp: , Rfl:     clonazePAM (KlonoPIN) 0 5 mg tablet, TAKE 1 TABLET(0 5 MG) BY MOUTH TWICE DAILY AS NEEDED FOR SEIZURES, Disp: 60 tablet, Rfl: 1    cloNIDine (CATAPRES) 0 1 mg tablet, Take 1 tablet (0 1 mg total) by mouth daily at bedtime, Disp: 30 tablet, Rfl: 1    DULoxetine (CYMBALTA) 30 mg delayed release capsule, Take 1 capsule (30 mg total) by mouth daily, Disp: 30 capsule, Rfl: 2    ergocalciferol (VITAMIN D2) 50,000 units, TAKE 1 CAPSULE BY MOUTH 1 TIME A WEEK, Disp: 12 capsule, Rfl: 1    folic acid (FOLVITE) 1 mg tablet, Take by mouth daily (Patient not taking: Reported on 1/6/2022 ), Disp: , Rfl:     gabapentin (NEURONTIN) 400 mg capsule, Take 2 capsules (800 mg total) by mouth 3 (three) times a day (Patient not taking: Reported on 1/6/2022 ), Disp: 90 capsule, Rfl: 0    gabapentin (NEURONTIN) 800 mg tablet, TAKE 1 TABLET(800 MG) BY MOUTH THREE TIMES DAILY, Disp: 90 tablet, Rfl: 2    glucose blood test strip, Check sugars 5 times a week, Disp: 100 strip, Rfl: 1    hydrOXYzine HCL (ATARAX) 50 mg tablet, Take 1 tablet (50 mg total) by mouth 2 (two) times a day as needed for anxiety, Disp: 30 tablet, Rfl: 0    insulin glargine (Lantus SoloStar) 100 units/mL injection pen, Inject 14 Units under the skin daily (Patient not taking: Reported on 1/6/2022 ), Disp: , Rfl:     insulin isophane (HumuLIN N KwikPen) 100 units/mL injection pen, Inject 15 Units under the skin 2 (two) times a day before meals, Disp: 15 mL, Rfl: 5    levothyroxine 25 mcg tablet, Take 1 tablet (25 mcg total) by mouth daily in the early morning, Disp: 7 tablet, Rfl: 0    lisinopril-hydrochlorothiazide (PRINZIDE,ZESTORETIC) 10-12 5 MG per tablet, TAKE 1 TABLET BY MOUTH DAILY, Disp: 90 tablet, Rfl: 1    magnesium oxide (MAG-OX) 400 mg, Take 1 tablet (400 mg total) by mouth daily for 14 days, Disp: 14 tablet, Rfl: 0    metFORMIN (GLUCOPHAGE) 1000 MG tablet, TAKE 1 TABLET(1000 MG) BY MOUTH TWICE DAILY WITH MEALS, Disp: 180 tablet, Rfl: 1    metoprolol tartrate (LOPRESSOR) 50 mg tablet, Take 1 tablet (50 mg total) by mouth every 12 (twelve) hours, Disp: 60 tablet, Rfl: 0    Microlet Lancets MISC, , Disp: , Rfl:     traZODone (DESYREL) 100 mg tablet, Take 2 tablets (200 mg total) by mouth daily at bedtime, Disp: 60 tablet, Rfl: 2  Allergies   Allergen Reactions    Latex Rash     There were no vitals filed for this visit      Labs:  Admission on 03/28/2022, Discharged on 03/28/2022   Component Date Value    WBC 03/28/2022 12 29*    RBC 03/28/2022 4 79     Hemoglobin 03/28/2022 14 3     Hematocrit 03/28/2022 43 3     MCV 03/28/2022 90     MCH 03/28/2022 29 9     MCHC 03/28/2022 33 0     RDW 03/28/2022 14 0     MPV 03/28/2022 9 9     Platelets 51/53/0250 365     nRBC 03/28/2022 0     Neutrophils Relative 03/28/2022 63     Immat GRANS % 03/28/2022 1     Lymphocytes Relative 03/28/2022 28     Monocytes Relative 03/28/2022 7     Eosinophils Relative 03/28/2022 1     Basophils Relative 03/28/2022 0     Neutrophils Absolute 03/28/2022 7 71*    Immature Grans Absolute 03/28/2022 0 09     Lymphocytes Absolute 03/28/2022 3 46     Monocytes Absolute 03/28/2022 0 91     Eosinophils Absolute 03/28/2022 0 08     Basophils Absolute 03/28/2022 0 04     Sodium 03/28/2022 135*    Potassium 03/28/2022 3 8     Chloride 03/28/2022 98*    CO2 03/28/2022 24     ANION GAP 03/28/2022 13     BUN 03/28/2022 11     Creatinine 03/28/2022 0 80     Glucose 03/28/2022 339*    Calcium 03/28/2022 9 4     AST 03/28/2022 23     ALT 03/28/2022 48     Alkaline Phosphatase 03/28/2022 109     Total Protein 03/28/2022 7 8     Albumin 03/28/2022 3 8     Total Bilirubin 03/28/2022 0 45     eGFR 03/28/2022 83     hs TnI 0hr 03/28/2022 2     hs TnI 2hr 03/28/2022 2     Delta 2hr hsTnI 03/28/2022 0     NT-proBNP 03/28/2022 46     D-Dimer, Quant 03/28/2022 0 83*    Ventricular Rate 03/28/2022 107     Atrial Rate 03/28/2022 112     WV Interval 03/28/2022 188     QRSD Interval 03/28/2022 64     QT Interval 03/28/2022 340     QTC Interval 03/28/2022 454     P Axis 03/28/2022 55     QRS Axis 03/28/2022 26     T Wave Ceres 03/28/2022 34    Admission on 03/27/2022, Discharged on 03/27/2022   Component Date Value    WBC 03/27/2022 14 07*    RBC 03/27/2022 4 86     Hemoglobin 03/27/2022 14 3     Hematocrit 03/27/2022 43 0     MCV 03/27/2022 89     MCH 03/27/2022 29 4     MCHC 03/27/2022 33 3     RDW 03/27/2022 14 0     MPV 03/27/2022 10 2     Platelets 84/31/3257 403*    nRBC 03/27/2022 0     Neutrophils Relative 03/27/2022 62     Immat GRANS % 03/27/2022 1     Lymphocytes Relative 03/27/2022 29     Monocytes Relative 03/27/2022 7     Eosinophils Relative 03/27/2022 1     Basophils Relative 03/27/2022 0     Neutrophils Absolute 03/27/2022 8 76*    Immature Grans Absolute 03/27/2022 0 10     Lymphocytes Absolute 03/27/2022 4 10     Monocytes Absolute 03/27/2022 0 98     Eosinophils Absolute 03/27/2022 0 10     Basophils Absolute 03/27/2022 0 03     hs TnI 0hr 03/27/2022 5     Sodium 03/27/2022 131*    Potassium 03/27/2022 3 8     Chloride 03/27/2022 93*    CO2 03/27/2022 27     ANION GAP 03/27/2022 11     BUN 03/27/2022 13     Creatinine 03/27/2022 0 68     Glucose 03/27/2022 283*    Calcium 03/27/2022 9 5     AST 03/27/2022 16     ALT 03/27/2022 27     Alkaline Phosphatase 03/27/2022 90     Total Protein 03/27/2022 7 5     Albumin 03/27/2022 4 2     Total Bilirubin 03/27/2022 0 46     eGFR 03/27/2022 99     TSH 3RD GENERATON 03/27/2022 2 046     Magnesium 03/27/2022 1 6*    hs TnI 2hr 03/27/2022 4     Delta 2hr hsTnI 03/27/2022 -1     Ventricular Rate 03/27/2022 97     Atrial Rate 03/27/2022 97     WV Interval 03/27/2022 193     QRSD Interval 03/27/2022 76     QT Interval 03/27/2022 365     QTC Interval 03/27/2022 464     P Axis 03/27/2022 61     QRS Axis 03/27/2022 45     T Wave Axis 03/27/2022 53      Imaging: X-ray chest 1 view portable    Result Date: 3/28/2022  Narrative: CHEST INDICATION:   chest pain  COMPARISON:  9/2/2021 EXAM PERFORMED/VIEWS:  XR CHEST PORTABLE Single view FINDINGS: Cardiomediastinal silhouette appears unremarkable  The lungs are clear  No pneumothorax or pleural effusion  Osseous structures appear within normal limits for patient age  Impression: No acute cardiopulmonary disease  Findings are stable Workstation performed: [id number]     CTA ED chest PE Study    Result Date: 3/28/2022  Narrative: CTA - CHEST WITH IV CONTRAST - PULMONARY ANGIOGRAM INDICATION:   CP, tachy, +dimer  COMPARISON: None  TECHNIQUE: CTA examination of the chest was performed using angiographic technique according to a protocol specifically tailored to evaluate for pulmonary embolism  Axial, sagittal, and coronal 2D reformatted images were created from the source data and  submitted for interpretation  In addition, coronal 3D MIP postprocessing was performed on the acquisition scanner  Radiation dose length product (DLP) for this visit:  676 mGy-cm   This examination, like all CT scans performed in the [facility], was performed utilizing techniques to minimize radiation dose exposure, including the use of iterative reconstruction and automated exposure control  IV Contrast:  85 mL of iohexol (OMNIPAQUE)  FINDINGS: PULMONARY ARTERIAL TREE:  No pulmonary embolus is seen  LUNGS:  Lungs are clear  There is no tracheal or endobronchial lesion  PLEURA:  Unremarkable  HEART/GREAT VESSELS:  Unremarkable for patient's age  No thoracic aortic aneurysm  MEDIASTINUM AND AMILCAR:  Unremarkable  CHEST WALL AND LOWER NECK:   Unremarkable  VISUALIZED STRUCTURES IN THE UPPER ABDOMEN:  Visualized hepatic parenchyma is diffusely decreased in density consistent with hepatic steatosis    Otherwise no clinical significant abnormality identified in the visualized upper abdomen  OSSEOUS STRUCTURES:  No acute fracture or destructive osseous lesion  Impression: No evidence of pulmonary embolus  No evidence of acute intrathoracic pathology  Workstation performed: [id number]       Review of Systems:  Review of Systems   Constitutional: Positive for fatigue  Musculoskeletal: Positive for arthralgias, back pain and myalgias  Psychiatric/Behavioral: The patient is nervous/anxious  All other systems reviewed and are negative  Physical Exam:  Physical Exam  Vitals reviewed  Constitutional:       Appearance: She is obese  HENT:      Head: Normocephalic  Eyes:      Pupils: Pupils are equal, round, and reactive to light  Cardiovascular:      Rate and Rhythm: Regular rhythm  Tachycardia present  Pulmonary:      Effort: Pulmonary effort is normal       Breath sounds: Normal breath sounds  Abdominal:      General: Bowel sounds are normal       Palpations: Abdomen is soft  Musculoskeletal:      Cervical back: Normal range of motion and neck supple  Right lower leg: No edema  Left lower leg: No edema  Skin:     General: Skin is warm and dry  Capillary Refill: Capillary refill takes less than 2 seconds  Neurological:      General: No focal deficit present  Mental Status: She is alert and oriented to person, place, and time  Psychiatric:         Mood and Affect: Mood normal          Behavior: Behavior normal          Discussion/Summary:  1  Sinus tachycardia ventricular rate 136 BPM,  Increase metoprolol tartrate from 50 mg q 12 hours 100 mg q 12 hours,  Continue magnesium oxide 400 mg  Daily,  stopping amlodipine for blood pressure room  TSH WNL On 3/27/22  Continue to hydrate I have ordered a 1 week ZIO patch to monitor heart rate and rhythm  2D echocardiogram evaluate  Wall function and valvular function  2   Hypertension LUE sitting 150/100  Stop amlodipine, increase metoprolol tartrate from 50 mg q 12 hours 200 mg q 12 hours  Continue on clonidine 0 1 mg daily at bedtime  Continue with home blood pressure monitoring  Continue on 2 g sodium diet  3  Hyperlipidemia 9/02/21 , , HDL 48,  -  Increase Lipitor from 10 mg daily to 20 mg daily  4  DM2  HgbA1C 8 6 on 7/01/21 continue on metformin 1000 mg  B i d  Humulin 15 units b i d  before meals  5  Obesity + snoring at night and daytime fatigue  Ambulatory to sleep medicine R/O sleep apnea

## 2022-03-31 ENCOUNTER — CONSULT (OUTPATIENT)
Dept: CARDIOLOGY CLINIC | Facility: CLINIC | Age: 57
End: 2022-03-31
Payer: COMMERCIAL

## 2022-03-31 VITALS
BODY MASS INDEX: 36.82 KG/M2 | WEIGHT: 200.1 LBS | HEART RATE: 136 BPM | OXYGEN SATURATION: 95 % | DIASTOLIC BLOOD PRESSURE: 106 MMHG | HEIGHT: 62 IN | SYSTOLIC BLOOD PRESSURE: 150 MMHG

## 2022-03-31 DIAGNOSIS — E78.5 HYPERLIPIDEMIA, UNSPECIFIED HYPERLIPIDEMIA TYPE: ICD-10-CM

## 2022-03-31 DIAGNOSIS — R00.0 SINUS TACHYCARDIA: Primary | ICD-10-CM

## 2022-03-31 DIAGNOSIS — E66.01 OBESITY, MORBID (HCC): ICD-10-CM

## 2022-03-31 DIAGNOSIS — I10 PRIMARY HYPERTENSION: ICD-10-CM

## 2022-03-31 DIAGNOSIS — E11.69 DIABETES MELLITUS TYPE 2 IN OBESE (HCC): ICD-10-CM

## 2022-03-31 DIAGNOSIS — E66.9 DIABETES MELLITUS TYPE 2 IN OBESE (HCC): ICD-10-CM

## 2022-03-31 PROCEDURE — 3008F BODY MASS INDEX DOCD: CPT | Performed by: INTERNAL MEDICINE

## 2022-03-31 PROCEDURE — 93000 ELECTROCARDIOGRAM COMPLETE: CPT | Performed by: INTERNAL MEDICINE

## 2022-03-31 PROCEDURE — 99204 OFFICE O/P NEW MOD 45 MIN: CPT | Performed by: INTERNAL MEDICINE

## 2022-03-31 RX ORDER — METOPROLOL TARTRATE 100 MG/1
100 TABLET ORAL EVERY 12 HOURS SCHEDULED
Qty: 120 TABLET | Refills: 3 | Status: SHIPPED | OUTPATIENT
Start: 2022-03-31 | End: 2022-06-06 | Stop reason: SDUPTHER

## 2022-03-31 RX ORDER — ATORVASTATIN CALCIUM 20 MG/1
20 TABLET, FILM COATED ORAL DAILY
Qty: 90 TABLET | Refills: 3 | Status: SHIPPED | OUTPATIENT
Start: 2022-03-31

## 2022-03-31 NOTE — PATIENT INSTRUCTIONS
2gm sodium low fat low cholesterol, diabetic diet  Hydrate  Increase Lipitor to 20mg daily   Increase Metoprolol tartrate 100mg Q12 hours  Stop Amlodipine  2 D echocardiogram  One week Zio patch

## 2022-04-04 ENCOUNTER — OFFICE VISIT (OUTPATIENT)
Dept: FAMILY MEDICINE CLINIC | Facility: CLINIC | Age: 57
End: 2022-04-04
Payer: COMMERCIAL

## 2022-04-04 VITALS
SYSTOLIC BLOOD PRESSURE: 122 MMHG | HEART RATE: 84 BPM | OXYGEN SATURATION: 98 % | HEIGHT: 62 IN | BODY MASS INDEX: 37.02 KG/M2 | TEMPERATURE: 97 F | RESPIRATION RATE: 16 BRPM | DIASTOLIC BLOOD PRESSURE: 80 MMHG | WEIGHT: 201.2 LBS

## 2022-04-04 DIAGNOSIS — Z12.4 SCREENING FOR CERVICAL CANCER: ICD-10-CM

## 2022-04-04 DIAGNOSIS — E11.69 DIABETES MELLITUS TYPE 2 IN OBESE (HCC): Primary | ICD-10-CM

## 2022-04-04 DIAGNOSIS — E66.9 DIABETES MELLITUS TYPE 2 IN OBESE (HCC): Primary | ICD-10-CM

## 2022-04-04 DIAGNOSIS — Z12.11 SCREENING FOR COLON CANCER: ICD-10-CM

## 2022-04-04 DIAGNOSIS — I10 PRIMARY HYPERTENSION: ICD-10-CM

## 2022-04-04 PROCEDURE — 4004F PT TOBACCO SCREEN RCVD TLK: CPT | Performed by: INTERNAL MEDICINE

## 2022-04-04 PROCEDURE — 3008F BODY MASS INDEX DOCD: CPT | Performed by: INTERNAL MEDICINE

## 2022-04-04 PROCEDURE — 3074F SYST BP LT 130 MM HG: CPT | Performed by: INTERNAL MEDICINE

## 2022-04-04 PROCEDURE — 3079F DIAST BP 80-89 MM HG: CPT | Performed by: INTERNAL MEDICINE

## 2022-04-04 PROCEDURE — 99214 OFFICE O/P EST MOD 30 MIN: CPT | Performed by: INTERNAL MEDICINE

## 2022-04-04 RX ORDER — INSULIN HUMAN 100 [IU]/ML
30 INJECTION, SUSPENSION SUBCUTANEOUS
Qty: 15 ML | Refills: 5
Start: 2022-04-04 | End: 2022-06-22

## 2022-04-04 NOTE — PATIENT INSTRUCTIONS
Basic Carbohydrate Counting   AMBULATORY CARE:   Carbohydrate counting  is a way to plan your meals by counting the amount of carbohydrate in foods  Carbohydrates are the sugars, starches, and fiber found in fruit, grains, vegetables, and milk products  Carbohydrates increase your blood sugar levels  Carbohydrate counting can help you eat the right amount of carbohydrate to keep your blood sugar levels under control  What you need to know about planning meals using carbohydrate counting:  · A dietitian or healthcare provider will help you develop a healthy meal plan that works best for you  You will be taught how much carbohydrate to eat or drink for each meal and snack  Your meal plan will be based on your age, weight, usual food intake, and physical activity level  If you have diabetes, it will also include your blood sugar levels and diabetes medicine  Once you know how much carbohydrate you should eat, you can decide what type of food you want to eat  · You will need to know what foods contain carbohydrate and how much they contain  Keep track of the amount of carbohydrate in meals and snacks in order to follow your meal plan  Do not avoid carbohydrates or skip meals  Your blood sugar may fall too low if you do not eat enough carbohydrate or you skip meals  Foods that contain carbohydrate:   · Breads:  Each serving of food listed below contains about 15 g of carbohydrate   ? 1 slice of bread (1 ounce) or 1 flour or corn tortilla (6 inch)    ? ½ of a hamburger bun or ¼ of a large bagel (about 1 ounce)    ? 1 pancake (about 4 inches across and ¼ inch thick)    · Cereals and grains:  Serving sizes of ready-to-eat cereals vary  Look at the serving size and the total carbohydrate amount listed on the food label  Each serving of food listed below contains about 15 g of carbohydrate   ? ¾ cup of dry, unsweetened, ready-to-eat cereal or ¼ cup of low-fat granola     ?  ½ cup of oatmeal or other cooked cereal     ? ? cup of cooked rice or pasta    · Starchy vegetables and beans:  Each serving of food listed below contains about 15 g of carbohydrate   ? ½ cup of corn, green peas, sweet potatoes, or mashed potatoes    ? ¼ of a large baked potato    ? ½ cup of beans, lentils, and peas (garbanzo, patel, kidney, white, split, black-eyed)    · Crackers and snacks:  Each serving of food listed below contains about 15 g of carbohydrate   ? 3 randi cracker squares or 8 animal crackers     ? 6 saltine-type crackers    ? 3 cups of popcorn or ¾ ounce of pretzels, potato chips, or tortilla chips    · Fruit:  Each serving of food listed below contains about 15 g of carbohydrate   ? 1 small (4 ounce) piece of fresh fruit or ¾ to 1 cup of fresh fruit    ? ½ cup of canned or frozen fruit, packed in natural juice    ? ½ cup (4 ounces) of unsweetened fruit juice    ? 2 tablespoons of dried fruit    · Desserts or sugary foods:  Each serving of food listed below contains about 15 g of carbohydrate   ? 2-inch square unfrosted cake or brownie     ? 2 small cookies    ? ½ cup of ice cream, frozen yogurt, or nondairy frozen yogurt    ? ¼ cup of sherbet or sorbet    ? 1 tablespoon of regular syrup, jam, or jelly    ? 2 tablespoons of light syrup    · Milk and yogurt:  Foods from the milk group contain about 12 g of carbohydrate per serving  ? 1 cup of fat-free or low-fat milk    ? 1 cup of soy milk    ? ? cup of fat-free, yogurt sweetened with artificial sweetener    · Non-starchy vegetables:  Each serving contains about 5 g of carbohydrate   Three servings of non-starch vegetables count as 1 carbohydrate serving  ? ½ cup of cooked vegetables or 1 cup of raw vegetables  This includes beets, broccoli, cabbage, cauliflower, cucumber, mushrooms, tomatoes, and zucchini    ?  ½ cup of vegetable juice    How to use carbohydrate counting to plan meals:   · Count carbohydrate amounts using serving sizes:      ? Pasta dinner example: You plan to have pasta, tossed salad, and an 8-ounce glass of milk  Your healthcare provider tells you that you may have 4 carbohydrate servings for dinner  One carbohydrate serving of pasta is ? cup  One cup of pasta will equal 3 carbohydrate servings  An 8-ounce glass of milk will count as 1 carbohydrate serving  These amounts of food would equal 4 carbohydrate servings  One cup of tossed salad does not count toward your carbohydrate servings  · Count carbohydrate amounts using food labels:  Find the total amount of carbohydrate in a packaged food by reading the food label  Food labels tell you the serving size of the food and the total carbohydrate amount in each serving  Find the serving size on the food label and then decide how many servings you will eat  Multiply the number of servings you plan to eat by the carbohydrate amount per serving  ? Granola bar snack example: Your meal plan allows you to have 2 carbohydrate servings (30 grams) of carbohydrate for a snack  You plan to eat 1 package of granola bars, which contains 2 bars  According to the food label, the serving size of food in this package is 1 bar  Each serving (1 bar) contains 25 grams of carbohydrate  The total amount of carbohydrate in this package of granola bars would be 50 g  Based on your meal plan, you should eat only 1 bar  Follow up with your doctor as directed:  Write down your questions so you remember to ask them during your visits  © Copyright [employer] 2022 Information is for End User's use only and may not be sold, redistributed or otherwise used for commercial purposes  All illustrations and images included in CareNotes® are the copyrighted property of A D A M , Inc  or [facility] [employer]   The above information is an  only  It is not intended as medical advice for individual conditions or treatments   Talk to your doctor, nurse or pharmacist before following any medical regimen to see if it is safe and effective for you

## 2022-04-04 NOTE — PROGRESS NOTES
BMI Counseling: Body mass index is 36 68 kg/m²  The BMI is above normal  Nutrition recommendations include decreasing portion sizes, decreasing fast food intake, consuming healthier snacks, limiting drinks that contain sugar, moderation in carbohydrate intake and reducing intake of cholesterol  Exercise recommendations include exercising 3-5 times per week  Rationale for BMI follow-up plan is due to patient being overweight or obese  Tobacco Cessation Counseling: The patient is sincerely urged to quit consumption of tobacco  She is not ready to quit tobacco    Assessment/Plan:         Problem List Items Addressed This Visit        Endocrine    Diabetes mellitus type 2 in obese [facility]) - Primary       Lab Results   Component Value Date    HGBA1C 8 6 (H) 07/01/2021   Advised to increase Lantus to 30 units from 16 units due to her sugar is running around 300  Counseling given on diet  She has appointment with endocrinologist on June 8, 2022  check A1c BMP and microalbumin and lipid profile  Relevant Medications    insulin isophane (HumuLIN N KwikPen) 100 units/mL injection pen    Other Relevant Orders    Lipid panel    Basic metabolic panel    HEMOGLOBIN A1C W/ EAG ESTIMATION    Microalbumin / creatinine urine ratio       Cardiovascular and Mediastinum    Primary hypertension     Feeling better since cardiologist increased metoprolol 100 mg bid  Continue current meds  Other Visit Diagnoses     Screening for cervical cancer        Screening for colon cancer                Subjective:      Patient ID: Yumi Canales is a 49 y o  female  1  htn- now she is feeling much better since metoprolol was increased to 100 mg twice a day  Tolerating well  She was also started on lisinopril HCTZ  No headache dizziness or lightheadedness  No chest pain or increased dyspnea  No syncope  No cough  No muscle cramps or abdominal pain  No angioedema  No increased fatigue  2  Diabetes type 2    She is taking Lantus 15-16 unit once a day only  She is noncompliant with diet  However she denies any polyuria or polydipsia  No claudication pain  No abdominal pain nausea or vomiting  No chest pain  No palpitations  No change in the vision  No increased numbness      The following portions of the patient's history were reviewed and updated as appropriate:   Past Medical History:  She has a past medical history of Addiction to drug ([facility] ), Adjustment disorder, Alcohol abuse, Alcoholism ([facility] ), Anxiety, Bipolar disorder ([facility] ), Bowel obstruction ([facility] ), Depression, Diabetes type 2, controlled ([facility] ), Disease of thyroid gland, Gastritis, Head injury, Heart palpitations, Hyperlipidemia, Hypertension, Hypothyroidism, Psychiatric illness, PTSD (post-traumatic stress disorder), Seizure ([facility] ), Seizures ([facility] ), Sleep difficulties, Substance abuse ([facility] ), Suicide attempt ([place] [id number] ), and Withdrawal symptoms, drug or narcotic ([facility] )  ,  _______________________________________________________________________  Medical Problems:  does not have any pertinent problems on file ,  _______________________________________________________________________  Past Surgical History:   has a past surgical history that includes Bowel resection; Abdominal surgery; Hysterectomy; Knee surgery (Bilateral); Esophagogastroduodenoscopy (N/A, 5/18/2018); Appendectomy; and Cholecystectomy  ,  _______________________________________________________________________  Family History:  family history includes Bipolar disorder in her mother; Diabetes in her father ,  _______________________________________________________________________  Social History:   reports that she has been smoking cigarettes  She has a 12 50 pack-year smoking history  She has never used smokeless tobacco  She reports previous alcohol use of about 6 0 standard drinks of alcohol per week   She reports that she does not use drugs ,  _______________________________________________________________________  Allergies:  is allergic to latex     _______________________________________________________________________  Current Outpatient Medications   Medication Sig Dispense Refill    ARIPiprazole (ABILIFY) 5 mg tablet TAKE 1 TABLET(5 MG) BY MOUTH DAILY 90 tablet 1    atorvastatin (LIPITOR) 20 mg tablet Take 1 tablet (20 mg total) by mouth daily 90 tablet 3    benztropine (COGENTIN) 1 mg tablet Take 1 mg by mouth daily      buPROPion (WELLBUTRIN SR) 150 mg 12 hr tablet 150 mg daily        clonazePAM (KlonoPIN) 0 5 mg tablet TAKE 1 TABLET(0 5 MG) BY MOUTH TWICE DAILY AS NEEDED FOR SEIZURES 60 tablet 1    cloNIDine (CATAPRES) 0 1 mg tablet Take 1 tablet (0 1 mg total) by mouth daily at bedtime 30 tablet 1    DULoxetine (CYMBALTA) 30 mg delayed release capsule Take 1 capsule (30 mg total) by mouth daily 30 capsule 2    ergocalciferol (VITAMIN D2) 50,000 units TAKE 1 CAPSULE BY MOUTH 1 TIME A WEEK 12 capsule 1    folic acid (FOLVITE) 1 mg tablet Take by mouth daily        gabapentin (NEURONTIN) 800 mg tablet TAKE 1 TABLET(800 MG) BY MOUTH THREE TIMES DAILY 90 tablet 2    glucose blood test strip Check sugars 5 times a week 100 strip 1    hydrOXYzine HCL (ATARAX) 50 mg tablet Take 1 tablet (50 mg total) by mouth 2 (two) times a day as needed for anxiety 30 tablet 0    insulin glargine (Lantus SoloStar) 100 units/mL injection pen Inject 14 Units under the skin daily        insulin isophane (HumuLIN N KwikPen) 100 units/mL injection pen Inject 30 Units under the skin 2 (two) times a day before meals 15 mL 5    levothyroxine 25 mcg tablet Take 1 tablet (25 mcg total) by mouth daily in the early morning 7 tablet 0    lisinopril-hydrochlorothiazide (PRINZIDE,ZESTORETIC) 10-12 5 MG per tablet TAKE 1 TABLET BY MOUTH DAILY 90 tablet 1    magnesium oxide (MAG-OX) 400 mg Take 1 tablet (400 mg total) by mouth daily for 14 days 14 tablet 0    metFORMIN (GLUCOPHAGE) 1000 MG tablet TAKE 1 TABLET(1000 MG) BY MOUTH TWICE DAILY WITH MEALS 180 tablet 1    metoprolol tartrate (LOPRESSOR) 100 mg tablet Take 1 tablet (100 mg total) by mouth every 12 (twelve) hours 120 tablet 3    Microlet Lancets MISC       traZODone (DESYREL) 100 mg tablet Take 2 tablets (200 mg total) by mouth daily at bedtime 60 tablet 2    gabapentin (NEURONTIN) 400 mg capsule Take 2 capsules (800 mg total) by mouth 3 (three) times a day (Patient not taking: Reported on 4/4/2022 ) 90 capsule 0     No current facility-administered medications for this visit      _______________________________________________________________________  Review of Systems      Objective:  Vitals:    04/04/22 1512 04/04/22 1538   BP: 130/90 122/80   BP Location: Right arm    Patient Position: Sitting    Cuff Size: Large    Pulse: 84    Resp: 16    Temp: (!) 97 °F (36 1 °C)    TempSrc: Temporal    SpO2: 98%    Weight: 91 3 kg (201 lb 3 2 oz)    Height: 5' 2 1" (1 577 m)      Body mass index is 36 68 kg/m²  Physical Exam  Vitals and nursing note reviewed  Constitutional:       General: She is not in acute distress  Appearance: Normal appearance  She is well-developed  She is obese  She is not ill-appearing or diaphoretic  HENT:      Head: Atraumatic  Eyes:      General:         Right eye: No discharge  Left eye: No discharge  Neck:      Thyroid: No thyromegaly  Cardiovascular:      Rate and Rhythm: Normal rate and regular rhythm  Heart sounds: Normal heart sounds  Pulmonary:      Effort: Pulmonary effort is normal       Breath sounds: Normal breath sounds  No wheezing  Chest:      Chest wall: No tenderness  Abdominal:      General: Bowel sounds are normal       Palpations: Abdomen is soft  Tenderness: There is no abdominal tenderness  Musculoskeletal:         General: No tenderness  Cervical back: Neck supple     Lymphadenopathy:      Cervical: No cervical adenopathy  Skin:     Capillary Refill: Capillary refill takes less than 2 seconds  Findings: No erythema or rash  Neurological:      Mental Status: She is alert and oriented to person, place, and time     Psychiatric:         Mood and Affect: Mood normal          Behavior: Behavior normal          Judgment: Judgment normal

## 2022-04-04 NOTE — ASSESSMENT & PLAN NOTE
Lab Results   Component Value Date    HGBA1C 8 6 (H) 07/01/2021   Advised to increase Lantus to 30 units from 16 units due to her sugar is running around 300  Counseling given on diet  She has appointment with endocrinologist on June 8, 2022  check A1c BMP and microalbumin and lipid profile

## 2022-04-11 ENCOUNTER — TELEPHONE (OUTPATIENT)
Dept: CARDIOLOGY CLINIC | Facility: CLINIC | Age: 57
End: 2022-04-11

## 2022-04-11 NOTE — TELEPHONE ENCOUNTER
Rasheeda Dee, son, called to say there is "too much going on" at their house right now  Family members are moving back to [place] and there is just "craziness"  They received the zio holter patch on 4/7, but they are holding off putting it on his mom  They want to reschedule both the Echo appt with [facility]  I gave him the [phone] number to reschedule echo  I will check the acct later to get Velma's appt rescheduled AFTER the echo

## 2022-04-12 ENCOUNTER — TELEPHONE (OUTPATIENT)
Dept: FAMILY MEDICINE CLINIC | Facility: CLINIC | Age: 57
End: 2022-04-12

## 2022-04-12 DIAGNOSIS — F31.4 BIPOLAR DISORDER, CURRENT EPISODE DEPRESSED, SEVERE, WITHOUT PSYCHOTIC FEATURES (HCC): ICD-10-CM

## 2022-04-12 RX ORDER — HYDROXYZINE 50 MG/1
50 TABLET, FILM COATED ORAL 2 TIMES DAILY PRN
Qty: 30 TABLET | Refills: 3 | Status: SHIPPED | OUTPATIENT
Start: 2022-04-12

## 2022-04-12 NOTE — TELEPHONE ENCOUNTER
Pt called and stated that she is in  Need of the following medication sent to the [facility] pharmacy     hydrOXYzine HCL (ATARAX) 50 mg tablet

## 2022-04-17 DIAGNOSIS — F31.9 BIPOLAR I DISORDER WITH ANXIOUS DISTRESS (HCC): ICD-10-CM

## 2022-04-18 RX ORDER — GABAPENTIN 800 MG/1
TABLET ORAL
Qty: 90 TABLET | Refills: 2 | Status: SHIPPED | OUTPATIENT
Start: 2022-04-18 | End: 2022-06-22

## 2022-05-01 DIAGNOSIS — F31.9 BIPOLAR I DISORDER WITH ANXIOUS DISTRESS (HCC): ICD-10-CM

## 2022-05-02 RX ORDER — TRAZODONE HYDROCHLORIDE 100 MG/1
TABLET ORAL
Qty: 60 TABLET | Refills: 2 | Status: SHIPPED | OUTPATIENT
Start: 2022-05-02 | End: 2022-07-29 | Stop reason: SDUPTHER

## 2022-05-09 ENCOUNTER — TELEPHONE (OUTPATIENT)
Dept: CARDIOLOGY CLINIC | Facility: CLINIC | Age: 57
End: 2022-05-09

## 2022-05-09 NOTE — TELEPHONE ENCOUNTER
We received a fax from [email or web address] stating the zio patch has not yet been returned  I called the patient and left a vmm again asking if they ever put the monitor on her

## 2022-05-10 DIAGNOSIS — F31.9 BIPOLAR I DISORDER WITH ANXIOUS DISTRESS (HCC): ICD-10-CM

## 2022-05-10 RX ORDER — CLONAZEPAM 0.5 MG/1
TABLET ORAL
Qty: 60 TABLET | Refills: 1 | Status: SHIPPED | OUTPATIENT
Start: 2022-05-10 | End: 2022-07-29 | Stop reason: SDUPTHER

## 2022-06-06 DIAGNOSIS — I10 PRIMARY HYPERTENSION: ICD-10-CM

## 2022-06-06 RX ORDER — METOPROLOL TARTRATE 100 MG/1
100 TABLET ORAL EVERY 12 HOURS SCHEDULED
Qty: 180 TABLET | Refills: 0 | Status: SHIPPED | OUTPATIENT
Start: 2022-06-06

## 2022-06-12 DIAGNOSIS — I10 PRIMARY HYPERTENSION: ICD-10-CM

## 2022-06-14 RX ORDER — LISINOPRIL AND HYDROCHLOROTHIAZIDE 12.5; 1 MG/1; MG/1
1 TABLET ORAL DAILY
Qty: 90 TABLET | Refills: 1 | Status: SHIPPED | OUTPATIENT
Start: 2022-06-14 | End: 2022-06-22

## 2022-06-19 ENCOUNTER — APPOINTMENT (EMERGENCY)
Dept: RADIOLOGY | Facility: HOSPITAL | Age: 57
DRG: 203 | End: 2022-06-19
Payer: COMMERCIAL

## 2022-06-19 ENCOUNTER — HOSPITAL ENCOUNTER (INPATIENT)
Facility: HOSPITAL | Age: 57
LOS: 2 days | Discharge: HOME | DRG: 203 | End: 2022-06-22
Attending: EMERGENCY MEDICINE | Admitting: INTERNAL MEDICINE
Payer: COMMERCIAL

## 2022-06-19 DIAGNOSIS — I10 PRIMARY HYPERTENSION: ICD-10-CM

## 2022-06-19 DIAGNOSIS — J40 BRONCHITIS WITH ACUTE WHEEZING: ICD-10-CM

## 2022-06-19 DIAGNOSIS — R06.2 WHEEZING: ICD-10-CM

## 2022-06-19 DIAGNOSIS — R05.3 CHRONIC COUGH: ICD-10-CM

## 2022-06-19 DIAGNOSIS — E66.9 DIABETES MELLITUS TYPE 2 IN OBESE (HCC): ICD-10-CM

## 2022-06-19 DIAGNOSIS — J40 BRONCHITIS: Primary | ICD-10-CM

## 2022-06-19 DIAGNOSIS — E11.69 DIABETES MELLITUS TYPE 2 IN OBESE (HCC): ICD-10-CM

## 2022-06-19 PROBLEM — R06.02 SHORTNESS OF BREATH: Status: ACTIVE | Noted: 2022-06-19

## 2022-06-19 PROBLEM — F17.210 CIGARETTE SMOKER: Status: ACTIVE | Noted: 2022-06-19

## 2022-06-19 LAB
2HR DELTA HS TROPONIN: 0 NG/L
ALBUMIN SERPL BCP-MCNC: 4.2 G/DL (ref 3.5–5)
ALP SERPL-CCNC: 98 U/L (ref 34–104)
ALT SERPL W P-5'-P-CCNC: 29 U/L (ref 7–52)
ANION GAP SERPL CALCULATED.3IONS-SCNC: 10 MMOL/L (ref 4–13)
APTT PPP: 30 SECONDS (ref 23–37)
AST SERPL W P-5'-P-CCNC: 19 U/L (ref 13–39)
BASOPHILS # BLD AUTO: 0.03 THOUSANDS/ΜL (ref 0–0.1)
BASOPHILS NFR BLD AUTO: 0 % (ref 0–1)
BILIRUB SERPL-MCNC: 0.54 MG/DL (ref 0.2–1)
BUN SERPL-MCNC: 10 MG/DL (ref 5–25)
CALCIUM SERPL-MCNC: 9.1 MG/DL (ref 8.4–10.2)
CARDIAC TROPONIN I PNL SERPL HS: 3 NG/L
CARDIAC TROPONIN I PNL SERPL HS: 3 NG/L
CHLORIDE SERPL-SCNC: 102 MMOL/L (ref 96–108)
CO2 SERPL-SCNC: 26 MMOL/L (ref 21–32)
CREAT SERPL-MCNC: 0.71 MG/DL (ref 0.6–1.3)
EOSINOPHIL # BLD AUTO: 0.12 THOUSAND/ΜL (ref 0–0.61)
EOSINOPHIL NFR BLD AUTO: 1 % (ref 0–6)
ERYTHROCYTE [DISTWIDTH] IN BLOOD BY AUTOMATED COUNT: 13.4 % (ref 11.6–15.1)
FLUAV RNA RESP QL NAA+PROBE: NEGATIVE
FLUBV RNA RESP QL NAA+PROBE: NEGATIVE
GFR SERPL CREATININE-BSD FRML MDRD: 96 ML/MIN/1.73SQ M
GLUCOSE SERPL-MCNC: 140 MG/DL (ref 65–140)
GLUCOSE SERPL-MCNC: 261 MG/DL (ref 65–140)
GLUCOSE SERPL-MCNC: 277 MG/DL (ref 65–140)
HCT VFR BLD AUTO: 42.2 % (ref 34.8–46.1)
HGB BLD-MCNC: 14 G/DL (ref 11.5–15.4)
IMM GRANULOCYTES # BLD AUTO: 0.03 THOUSAND/UL (ref 0–0.2)
IMM GRANULOCYTES NFR BLD AUTO: 0 % (ref 0–2)
INR PPP: 0.88 (ref 0.84–1.19)
LYMPHOCYTES # BLD AUTO: 2.65 THOUSANDS/ΜL (ref 0.6–4.47)
LYMPHOCYTES NFR BLD AUTO: 29 % (ref 14–44)
MCH RBC QN AUTO: 29.7 PG (ref 26.8–34.3)
MCHC RBC AUTO-ENTMCNC: 33.2 G/DL (ref 31.4–37.4)
MCV RBC AUTO: 90 FL (ref 82–98)
MONOCYTES # BLD AUTO: 1 THOUSAND/ΜL (ref 0.17–1.22)
MONOCYTES NFR BLD AUTO: 11 % (ref 4–12)
NEUTROPHILS # BLD AUTO: 5.4 THOUSANDS/ΜL (ref 1.85–7.62)
NEUTS SEG NFR BLD AUTO: 59 % (ref 43–75)
NRBC BLD AUTO-RTO: 0 /100 WBCS
PLATELET # BLD AUTO: 315 THOUSANDS/UL (ref 149–390)
PMV BLD AUTO: 9.8 FL (ref 8.9–12.7)
POTASSIUM SERPL-SCNC: 4.2 MMOL/L (ref 3.5–5.3)
PROT SERPL-MCNC: 6.9 G/DL (ref 6.4–8.4)
PROTHROMBIN TIME: 11.9 SECONDS (ref 11.6–14.5)
RBC # BLD AUTO: 4.71 MILLION/UL (ref 3.81–5.12)
RSV RNA RESP QL NAA+PROBE: NEGATIVE
SARS-COV-2 RNA RESP QL NAA+PROBE: NEGATIVE
SODIUM SERPL-SCNC: 138 MMOL/L (ref 135–147)
WBC # BLD AUTO: 9.23 THOUSAND/UL (ref 4.31–10.16)

## 2022-06-19 PROCEDURE — 94760 N-INVAS EAR/PLS OXIMETRY 1: CPT

## 2022-06-19 PROCEDURE — 85610 PROTHROMBIN TIME: CPT | Performed by: EMERGENCY MEDICINE

## 2022-06-19 PROCEDURE — 71045 X-RAY EXAM CHEST 1 VIEW: CPT

## 2022-06-19 PROCEDURE — 82948 REAGENT STRIP/BLOOD GLUCOSE: CPT

## 2022-06-19 PROCEDURE — 94664 DEMO&/EVAL PT USE INHALER: CPT

## 2022-06-19 PROCEDURE — 85730 THROMBOPLASTIN TIME PARTIAL: CPT | Performed by: EMERGENCY MEDICINE

## 2022-06-19 PROCEDURE — 99285 EMERGENCY DEPT VISIT HI MDM: CPT

## 2022-06-19 PROCEDURE — 80053 COMPREHEN METABOLIC PANEL: CPT | Performed by: EMERGENCY MEDICINE

## 2022-06-19 PROCEDURE — 94640 AIRWAY INHALATION TREATMENT: CPT

## 2022-06-19 PROCEDURE — 0241U HB NFCT DS VIR RESP RNA 4 TRGT: CPT | Performed by: EMERGENCY MEDICINE

## 2022-06-19 PROCEDURE — 84484 ASSAY OF TROPONIN QUANT: CPT | Performed by: EMERGENCY MEDICINE

## 2022-06-19 PROCEDURE — 83036 HEMOGLOBIN GLYCOSYLATED A1C: CPT

## 2022-06-19 PROCEDURE — 99220 PR INITIAL OBSERVATION CARE/DAY 70 MINUTES: CPT | Performed by: INTERNAL MEDICINE

## 2022-06-19 PROCEDURE — 85025 COMPLETE CBC W/AUTO DIFF WBC: CPT | Performed by: EMERGENCY MEDICINE

## 2022-06-19 PROCEDURE — 99285 EMERGENCY DEPT VISIT HI MDM: CPT | Performed by: EMERGENCY MEDICINE

## 2022-06-19 PROCEDURE — 96374 THER/PROPH/DIAG INJ IV PUSH: CPT

## 2022-06-19 PROCEDURE — 36415 COLL VENOUS BLD VENIPUNCTURE: CPT | Performed by: EMERGENCY MEDICINE

## 2022-06-19 PROCEDURE — 93005 ELECTROCARDIOGRAM TRACING: CPT

## 2022-06-19 RX ORDER — INSULIN LISPRO 100 [IU]/ML
2-12 INJECTION, SOLUTION INTRAVENOUS; SUBCUTANEOUS
Status: DISCONTINUED | OUTPATIENT
Start: 2022-06-19 | End: 2022-06-22 | Stop reason: HOSPADM

## 2022-06-19 RX ORDER — BENZONATATE 100 MG/1
100 CAPSULE ORAL 3 TIMES DAILY
Status: DISCONTINUED | OUTPATIENT
Start: 2022-06-19 | End: 2022-06-20

## 2022-06-19 RX ORDER — MAGNESIUM HYDROXIDE/ALUMINUM HYDROXICE/SIMETHICONE 120; 1200; 1200 MG/30ML; MG/30ML; MG/30ML
30 SUSPENSION ORAL EVERY 6 HOURS PRN
Status: DISCONTINUED | OUTPATIENT
Start: 2022-06-19 | End: 2022-06-22 | Stop reason: HOSPADM

## 2022-06-19 RX ORDER — SODIUM CHLORIDE FOR INHALATION 0.9 %
3 VIAL, NEBULIZER (ML) INHALATION EVERY 6 HOURS PRN
Status: DISCONTINUED | OUTPATIENT
Start: 2022-06-19 | End: 2022-06-22 | Stop reason: HOSPADM

## 2022-06-19 RX ORDER — ENOXAPARIN SODIUM 100 MG/ML
40 INJECTION SUBCUTANEOUS DAILY
Status: DISCONTINUED | OUTPATIENT
Start: 2022-06-19 | End: 2022-06-22 | Stop reason: HOSPADM

## 2022-06-19 RX ORDER — LEVOTHYROXINE SODIUM 0.03 MG/1
25 TABLET ORAL
Status: DISCONTINUED | OUTPATIENT
Start: 2022-06-20 | End: 2022-06-22 | Stop reason: HOSPADM

## 2022-06-19 RX ORDER — NICOTINE 21 MG/24HR
1 PATCH, TRANSDERMAL 24 HOURS TRANSDERMAL DAILY
Status: DISCONTINUED | OUTPATIENT
Start: 2022-06-19 | End: 2022-06-22 | Stop reason: HOSPADM

## 2022-06-19 RX ORDER — ALBUTEROL SULFATE 90 UG/1
2 AEROSOL, METERED RESPIRATORY (INHALATION) ONCE
Status: DISCONTINUED | OUTPATIENT
Start: 2022-06-19 | End: 2022-06-19

## 2022-06-19 RX ORDER — METHYLPREDNISOLONE 4 MG/1
TABLET ORAL
Qty: 21 TABLET | Refills: 0 | Status: SHIPPED | OUTPATIENT
Start: 2022-06-19 | End: 2022-06-19 | Stop reason: ALTCHOICE

## 2022-06-19 RX ORDER — METHYLPREDNISOLONE SODIUM SUCCINATE 40 MG/ML
40 INJECTION, POWDER, LYOPHILIZED, FOR SOLUTION INTRAMUSCULAR; INTRAVENOUS EVERY 8 HOURS SCHEDULED
Status: DISCONTINUED | OUTPATIENT
Start: 2022-06-19 | End: 2022-06-20

## 2022-06-19 RX ORDER — ATORVASTATIN CALCIUM 20 MG/1
20 TABLET, FILM COATED ORAL DAILY
Status: DISCONTINUED | OUTPATIENT
Start: 2022-06-19 | End: 2022-06-22 | Stop reason: HOSPADM

## 2022-06-19 RX ORDER — SODIUM CHLORIDE FOR INHALATION 0.9 %
3 VIAL, NEBULIZER (ML) INHALATION
Status: DISCONTINUED | OUTPATIENT
Start: 2022-06-19 | End: 2022-06-19

## 2022-06-19 RX ORDER — CLONAZEPAM 0.5 MG/1
0.5 TABLET ORAL 2 TIMES DAILY PRN
Status: DISCONTINUED | OUTPATIENT
Start: 2022-06-19 | End: 2022-06-22 | Stop reason: HOSPADM

## 2022-06-19 RX ORDER — INSULIN GLARGINE 100 [IU]/ML
20 INJECTION, SOLUTION SUBCUTANEOUS
Status: DISCONTINUED | OUTPATIENT
Start: 2022-06-19 | End: 2022-06-20

## 2022-06-19 RX ORDER — PANTOPRAZOLE SODIUM 40 MG/1
40 TABLET, DELAYED RELEASE ORAL
Status: DISCONTINUED | OUTPATIENT
Start: 2022-06-20 | End: 2022-06-22 | Stop reason: HOSPADM

## 2022-06-19 RX ORDER — LEVALBUTEROL INHALATION SOLUTION 1.25 MG/3ML
1.25 SOLUTION RESPIRATORY (INHALATION) EVERY 6 HOURS PRN
Status: DISCONTINUED | OUTPATIENT
Start: 2022-06-19 | End: 2022-06-22 | Stop reason: HOSPADM

## 2022-06-19 RX ORDER — ACETAMINOPHEN 325 MG/1
650 TABLET ORAL EVERY 6 HOURS PRN
Status: DISCONTINUED | OUTPATIENT
Start: 2022-06-19 | End: 2022-06-22 | Stop reason: HOSPADM

## 2022-06-19 RX ORDER — GABAPENTIN 400 MG/1
800 CAPSULE ORAL 3 TIMES DAILY
Status: DISCONTINUED | OUTPATIENT
Start: 2022-06-19 | End: 2022-06-22 | Stop reason: HOSPADM

## 2022-06-19 RX ORDER — INSULIN LISPRO 100 [IU]/ML
1-6 INJECTION, SOLUTION INTRAVENOUS; SUBCUTANEOUS
Status: DISCONTINUED | OUTPATIENT
Start: 2022-06-19 | End: 2022-06-22 | Stop reason: HOSPADM

## 2022-06-19 RX ORDER — LEVALBUTEROL INHALATION SOLUTION 1.25 MG/3ML
1.25 SOLUTION RESPIRATORY (INHALATION)
Status: DISCONTINUED | OUTPATIENT
Start: 2022-06-19 | End: 2022-06-22 | Stop reason: HOSPADM

## 2022-06-19 RX ORDER — ALBUTEROL SULFATE 2.5 MG/3ML
2.5 SOLUTION RESPIRATORY (INHALATION) ONCE
Status: COMPLETED | OUTPATIENT
Start: 2022-06-19 | End: 2022-06-19

## 2022-06-19 RX ORDER — IPRATROPIUM BROMIDE AND ALBUTEROL SULFATE 2.5; .5 MG/3ML; MG/3ML
3 SOLUTION RESPIRATORY (INHALATION)
Status: DISCONTINUED | OUTPATIENT
Start: 2022-06-19 | End: 2022-06-19

## 2022-06-19 RX ORDER — AZITHROMYCIN 250 MG/1
TABLET, FILM COATED ORAL
Qty: 6 TABLET | Refills: 0 | Status: SHIPPED | OUTPATIENT
Start: 2022-06-19 | End: 2022-06-19 | Stop reason: ALTCHOICE

## 2022-06-19 RX ORDER — TRAZODONE HYDROCHLORIDE 100 MG/1
200 TABLET ORAL
Status: DISCONTINUED | OUTPATIENT
Start: 2022-06-19 | End: 2022-06-22 | Stop reason: HOSPADM

## 2022-06-19 RX ORDER — METHYLPREDNISOLONE SODIUM SUCCINATE 125 MG/2ML
80 INJECTION, POWDER, LYOPHILIZED, FOR SOLUTION INTRAMUSCULAR; INTRAVENOUS ONCE
Status: COMPLETED | OUTPATIENT
Start: 2022-06-19 | End: 2022-06-19

## 2022-06-19 RX ORDER — METOPROLOL TARTRATE 50 MG/1
100 TABLET, FILM COATED ORAL EVERY 12 HOURS SCHEDULED
Status: DISCONTINUED | OUTPATIENT
Start: 2022-06-19 | End: 2022-06-22 | Stop reason: HOSPADM

## 2022-06-19 RX ORDER — DULOXETIN HYDROCHLORIDE 30 MG/1
30 CAPSULE, DELAYED RELEASE ORAL DAILY
Status: DISCONTINUED | OUTPATIENT
Start: 2022-06-19 | End: 2022-06-22 | Stop reason: HOSPADM

## 2022-06-19 RX ORDER — GUAIFENESIN 100 MG/5ML
200 SOLUTION ORAL EVERY 4 HOURS PRN
Status: DISCONTINUED | OUTPATIENT
Start: 2022-06-19 | End: 2022-06-20

## 2022-06-19 RX ADMIN — METOPROLOL TARTRATE 100 MG: 50 TABLET, FILM COATED ORAL at 22:10

## 2022-06-19 RX ADMIN — GABAPENTIN 800 MG: 400 CAPSULE ORAL at 22:07

## 2022-06-19 RX ADMIN — GUAIFENESIN 200 MG: 200 SOLUTION ORAL at 22:18

## 2022-06-19 RX ADMIN — BENZONATATE 100 MG: 100 CAPSULE ORAL at 16:37

## 2022-06-19 RX ADMIN — LEVALBUTEROL HYDROCHLORIDE 1.25 MG: 1.25 SOLUTION RESPIRATORY (INHALATION) at 20:01

## 2022-06-19 RX ADMIN — GABAPENTIN 800 MG: 400 CAPSULE ORAL at 16:37

## 2022-06-19 RX ADMIN — TRAZODONE HYDROCHLORIDE 200 MG: 100 TABLET ORAL at 22:07

## 2022-06-19 RX ADMIN — INSULIN GLARGINE 20 UNITS: 100 INJECTION, SOLUTION SUBCUTANEOUS at 22:08

## 2022-06-19 RX ADMIN — ALBUTEROL SULFATE 2.5 MG: 2.5 SOLUTION RESPIRATORY (INHALATION) at 10:57

## 2022-06-19 RX ADMIN — ACETAMINOPHEN 650 MG: 325 TABLET ORAL at 16:58

## 2022-06-19 RX ADMIN — INSULIN LISPRO 6 UNITS: 100 INJECTION, SOLUTION INTRAVENOUS; SUBCUTANEOUS at 22:07

## 2022-06-19 RX ADMIN — METHYLPREDNISOLONE SODIUM SUCCINATE 40 MG: 40 INJECTION, POWDER, FOR SOLUTION INTRAMUSCULAR; INTRAVENOUS at 16:37

## 2022-06-19 RX ADMIN — IPRATROPIUM BROMIDE AND ALBUTEROL SULFATE 3 ML: 2.5; .5 SOLUTION RESPIRATORY (INHALATION) at 14:25

## 2022-06-19 RX ADMIN — IPRATROPIUM BROMIDE AND ALBUTEROL SULFATE 3 ML: 2.5; .5 SOLUTION RESPIRATORY (INHALATION) at 10:16

## 2022-06-19 RX ADMIN — METHYLPREDNISOLONE SODIUM SUCCINATE 80 MG: 125 INJECTION, POWDER, FOR SOLUTION INTRAMUSCULAR; INTRAVENOUS at 10:26

## 2022-06-19 RX ADMIN — ALBUTEROL SULFATE 2.5 MG: 2.5 SOLUTION RESPIRATORY (INHALATION) at 12:05

## 2022-06-19 RX ADMIN — INSULIN LISPRO 3 UNITS: 100 INJECTION, SOLUTION INTRAVENOUS; SUBCUTANEOUS at 16:37

## 2022-06-19 RX ADMIN — ENOXAPARIN SODIUM 40 MG: 40 INJECTION SUBCUTANEOUS at 16:44

## 2022-06-19 RX ADMIN — IPRATROPIUM BROMIDE 0.5 MG: 0.5 SOLUTION RESPIRATORY (INHALATION) at 20:02

## 2022-06-19 RX ADMIN — METHYLPREDNISOLONE SODIUM SUCCINATE 40 MG: 40 INJECTION, POWDER, FOR SOLUTION INTRAMUSCULAR; INTRAVENOUS at 22:11

## 2022-06-19 RX ADMIN — BENZONATATE 100 MG: 100 CAPSULE ORAL at 22:07

## 2022-06-19 NOTE — ASSESSMENT & PLAN NOTE
Patient on multiple medications however she only takes gabapentin 800 mg 3 times a day and Klonopin 0 5 mg b i d  As needed for anxiety  Patient states she stopped all other medications

## 2022-06-19 NOTE — DISCHARGE INSTRUCTIONS
Return to er with return of symptoms, if you have chest pain, shortness of breath, legs welling or fever  See pcp in 3 days for follow up

## 2022-06-19 NOTE — ASSESSMENT & PLAN NOTE
Patient presented with complaints of wheezing, shortness of breath-acute viral bronchitis  No prior history of asthma, COPD  Improved with steroids and nebulizer treatment  Negative for COVID and RSV and influenza  Chest x-ray does not show any consolidation  Will continue steroids and nebulizer treatment at this time    Patient would need PFTs once discharged from the hospital in few weeks

## 2022-06-19 NOTE — H&P
So[facility] 47  H&P- Silver Orozco 1965, 49 y o  female MRN: [id number]  Unit/Bed#: -01 Encounter: [id number]  Primary Care Provider: Debra Head MD   Date and time admitted to hospital: 6/19/2022 10:06 AM    * Shortness of breath  Assessment & Plan  Patient presented with complaints of wheezing, shortness of breath-acute viral bronchitis  No prior history of asthma, COPD  Improved with steroids and nebulizer treatment  Negative for COVID and RSV and influenza  Chest x-ray does not show any consolidation  Will continue steroids and nebulizer treatment at this time  Patient would need PFTs once discharged from the hospital in few weeks    Cigarette smoker  Assessment & Plan  Smokes 1 pack of cigarettes a day, patient is willing to quit smoking  I have explained risks of developing COPD and cancer in she understands the risks  Agreeable for nicotine patch    Diabetes mellitus type 2 in [facility])  Assessment & Plan    Lab Results   Component Value Date    HGBA1C 8 6 (H) 07/01/2021   Hold metformin, continue Lantus, give sliding scale insulin  Patient states she takes 30 units of Lantus at home, will give 20 units of Lantus and titrate upwards if required while on steroids    Gastroesophageal reflux disease without esophagitis  Assessment & Plan  Currently not on any medication, given her wheezing will start her on pantoprazole  Hyperlipemia  Assessment & Plan  Continue statin    Acquired hypothyroidism  Assessment & Plan  Continue levothyroxine  Generalized anxiety disorder  Assessment & Plan  Patient on multiple medications however she only takes gabapentin 800 mg 3 times a day and Klonopin 0 5 mg b i d  As needed for anxiety  Patient states she stopped all other medications      VTE Prophylaxis: Enoxaparin (Lovenox)  / sequential compression device   Code Status:  Full code  POLST: There is no POLST form on file for this patient (pre-hospital)  Discussion with family: The patient's boyfriend at the bedside    Anticipated Length of Stay:  Patient will be admitted on an observation basis with an anticipated length of stay of  less than 2 midnights  Justification for Hospital Stay:  Shortness of breath, wheezing    Total Time for Visit, including Counseling / Coordination of Care: 70 minutes  Greater than 50% of this total time spent on direct patient counseling and coordination of care  Chief Complaint:   Shortness of breath, wheezing    History of Present Illness:    Óscar Adams is a 49 y o  female who presents with cough, shortness of breath  symptoms started 3 days ago with cough, congestion, wheezing since last night  Denies any fever, chills, chest pain  However complains of chest tightness  Denies any nausea vomiting, abdominal pain  Denies any urinary or bowel complaints  Patient states last night she could not sleep because of the wheezing and shortness of breath and had to come to the hospital this morning  Patient states her cough is persistent, nonproductive  No fever at home however patient's boyfriend noticed sweating at night  No sick contacts  Review of Systems:    More than 10 system review of systems was performed, pertinent positive and negative findings mentioned as per history present illness        Past Medical and Surgical History:     Past Medical History:   Diagnosis Date    Addiction to drug ([facility] )     Adjustment disorder     Alcohol abuse     Alcoholism ([facility]     Anxiety     Bipolar disorder (HCC)     Bowel obstruction ([address] )     Depression     Diabetes type 2, controlled ([address] )     Disease of thyroid gland     Gastritis     Head injury     Heart palpitations     Hyperlipidemia     Hypertension     Hypothyroidism     Psychiatric illness     PTSD (post-traumatic stress disorder)     Seizure ([facility] [facility] )     Seizures ([address] )     Sleep difficulties     Substance abuse ([facility] [address] [facility] )     Suicide attempt ([facility] [address] )     Withdrawal symptoms, drug or narcotic [facility])        Past Surgical History:   Procedure Laterality Date    ABDOMINAL SURGERY      APPENDECTOMY      BOWEL RESECTION      CHOLECYSTECTOMY      ESOPHAGOGASTRODUODENOSCOPY N/A 5/18/2018    Procedure: ESOPHAGOGASTRODUODENOSCOPY (EGD) with bx;  Surgeon: Wilton Hernandez DO;  Location: [facility]; Service: Gastroenterology    HYSTERECTOMY      KNEE SURGERY Bilateral        Meds/Allergies:    Prior to Admission medications    Medication Sig Start Date End Date Taking?  Authorizing Provider   atorvastatin (LIPITOR) 20 mg tablet Take 1 tablet (20 mg total) by mouth daily 3/31/22  Yes YONG Mckinnon   azithromycin (ZITHROMAX) 250 mg tablet Take 2 tablets today then 1 tablet daily x 4 days 6/19/22 6/23/22 Yes Henry Hough MD   clonazePAM (KlonoPIN) 0 5 mg tablet TAKE 1 TABLET(0 5 MG) BY MOUTH TWICE DAILY AS NEEDED FOR SEIZURES 5/10/22  Yes Lucinda Oconnell MD   DULoxetine (CYMBALTA) 30 mg delayed release capsule Take 1 capsule (30 mg total) by mouth daily 1/3/22  Yes Lucinda Oconnell MD   ergocalciferol (VITAMIN D2) 50,000 units TAKE 1 CAPSULE BY MOUTH 1 TIME A WEEK 12/9/21  Yes Lucinda Oconnell MD   glucose blood test strip Check sugars 5 times a week 1/18/22  Yes Lucinda Oconnell MD   insulin glargine (Lantus SoloStar) 100 units/mL injection pen Inject 14 Units under the skin daily   7/2/21  Yes Historical Provider, MD   insulin isophane (HumuLIN N KwikPen) 100 units/mL injection pen Inject 30 Units under the skin 2 (two) times a day before meals 4/4/22  Yes Lucinda Oconnell MD   levothyroxine 25 mcg tablet Take 1 tablet (25 mcg total) by mouth daily in the early morning 10/13/20  Yes Sindy Philip PA-C   lisinopril-hydrochlorothiazide (PRINZIDE,ZESTORETIC) 10-12 5 MG per tablet TAKE 1 TABLET BY MOUTH DAILY 6/14/22  Yes Lucinda Oconnell MD   metFORMIN (GLUCOPHAGE) 1000 MG tablet TAKE 1 TABLET(1000 MG) BY MOUTH TWICE DAILY WITH MEALS 3/3/22  Yes Zia Dey MD   methylPREDNISolone 4 MG tablet therapy pack Use as directed on package 6/19/22  Yes Urmila Shoemaker MD   metoprolol tartrate (LOPRESSOR) 100 mg tablet Take 1 tablet (100 mg total) by mouth every 12 (twelve) hours 6/6/22  Yes YONG Skinner   traZODone (DESYREL) 100 mg tablet TAKE 2 TABLETS(200 MG) BY MOUTH DAILY AT BEDTIME 5/2/22  Yes Zia Dey MD   ARIPiprazole (ABILIFY) 5 mg tablet TAKE 1 TABLET(5 MG) BY MOUTH DAILY  Patient not taking: Reported on 6/19/2022 1/4/22   Zia Dey MD   benztropine (COGENTIN) 1 mg tablet Take 1 mg by mouth daily  Patient not taking: Reported on 6/19/2022 7/23/21   Historical Provider, MD   buPROPion [facility]) 150 mg 12 hr tablet 150 mg daily    Patient not taking: Reported on 6/19/2022 11/14/21   Historical Provider, MD   cloNIDine (CATAPRES) 0 1 mg tablet Take 1 tablet (0 1 mg total) by mouth daily at bedtime  Patient not taking: Reported on 6/19/2022 10/12/20   Musa Crisostomo PA-C   folic acid (FOLVITE) 1 mg tablet Take by mouth daily    Patient not taking: Reported on 6/19/2022    Historical Provider, MD   gabapentin (NEURONTIN) 400 mg capsule Take 2 capsules (800 mg total) by mouth 3 (three) times a day  Patient not taking: No sig reported 3/31/21   Edith Cobos MD   gabapentin (NEURONTIN) 800 mg tablet TAKE 1 TABLET(800 MG) BY MOUTH THREE TIMES DAILY  Patient not taking: Reported on 6/19/2022 4/18/22   Zia Dey MD   hydrOXYzine HCL (ATARAX) 50 mg tablet Take 1 tablet (50 mg total) by mouth 2 (two) times a day as needed for anxiety  Patient not taking: Reported on 6/19/2022 4/12/22   Zia Dey MD   magnesium oxide (MAG-OX) 400 mg Take 1 tablet (400 mg total) by mouth daily for 14 days 3/27/22 4/10/22  Esther Giron MD   Microlet Lancets MISC  1/3/22   Historical Provider, MD     I have reviewed home medications with patient personally  Allergies:    Allergies Allergen Reactions    Latex Rash       Social History:     Marital Status:      Social History     Substance and Sexual Activity   Alcohol Use Not Currently    Alcohol/week: 6 0 standard drinks    Types: 6 Cans of beer per week    Comment: last drink on 08/15/20     Social History     Tobacco Use   Smoking Status Current Every Day Smoker    Packs/day: 0 50    Years: 25 00    Pack years: 12 50    Types: Cigarettes   Smokeless Tobacco Never Used     Social History     Substance and Sexual Activity   Drug Use No       Family History:    non-contributory    Physical Exam:     Vitals:   Blood Pressure: 130/72 (06/19/22 1322)  Pulse: 87 (06/19/22 1322)  Temperature: 98 3 °F (36 8 °C) (06/19/22 1010)  Temp Source: Oral (06/19/22 1010)  Respirations: 20 (06/19/22 1322)  Height: 5' 3" (160 cm) (06/19/22 1010)  Weight - Scale: 88 5 kg (195 lb) (06/19/22 1010)  SpO2: 98 % (06/19/22 1425)    Physical Exam     General appearance:  Patient not in acute distress  Eyes:  Pupils equal reacting to light  ENT:  Moist oral mucous membranes  CVS:  S1-S2 heard, regular rate and rhythm, no pedal edema  Chest:  Bilateral air entry present, wheezing bilateral lung fields  Abdomen:  Soft, nontender, bowel sounds present  CNS:  No focal neurological deficits  Genitourinary: deferred  Skin:  No acute rash   psychiatric:  No psychosis  Musculoskeletal:  No joint deformities       Additional Data:     Lab Results: I have personally reviewed pertinent reports        Results from last 7 days   Lab Units 06/19/22  1017   WBC Thousand/uL 9 23   HEMOGLOBIN g/dL 14 0   HEMATOCRIT % 42 2   PLATELETS Thousands/uL 315   NEUTROS PCT % 59   LYMPHS PCT % 29   MONOS PCT % 11   EOS PCT % 1     Results from last 7 days   Lab Units 06/19/22  1048   SODIUM mmol/L 138   POTASSIUM mmol/L 4 2   CHLORIDE mmol/L 102   CO2 mmol/L 26   BUN mg/dL 10   CREATININE mg/dL 0 71   ANION GAP mmol/L 10   CALCIUM mg/dL 9 1   ALBUMIN g/dL 4 2   TOTAL BILIRUBIN mg/dL 0 54   ALK PHOS U/L 98   ALT U/L 29   AST U/L 19   GLUCOSE RANDOM mg/dL 140     Results from last 7 days   Lab Units 06/19/22  1017   INR  0 88                   Imaging: I have personally reviewed pertinent reports  XR chest 1 view portable    (Results Pending)     Chest x-ray reviewed personally by me does not show any acute infiltrates or effusion  EKG, Pathology, and Other Studies Reviewed on Admission:   · EKG:  Sinus rhythm, no acute ST T wave changes  Reviewed personally by me  Q-waves in V1 and V2 noticed, old  Allscripts / Epic Records Reviewed: Yes     ** Please Note: This note has been constructed using a voice recognition system   **

## 2022-06-19 NOTE — ED NOTES
Pt o2 saturation dropped to 88% while sleeping, 2L NC applied with rise to 93%, provider made aware     Brigitte August RN  06/19/22 3333

## 2022-06-19 NOTE — ED PROVIDER NOTES
History  Chief Complaint   Patient presents with    Wheezing     C/o wheezing and cough x 3 days     To er with 3 days of dry cough, congestion and run down sensation  She reported she started wheezing last night  Her chest feels tight from her wheezing she states  No fever, chills, leg swelling  She dneies rad of her chest tightness and states the entire chest feels that way  Hx of bronchitis, this feels similar  Denies abd pain, n/v/d/uti sx  Prior to Admission Medications   Prescriptions Last Dose Informant Patient Reported? Taking?    ARIPiprazole (ABILIFY) 5 mg tablet Not Taking at Unknown time Self No No   Sig: TAKE 1 TABLET(5 MG) BY MOUTH DAILY   Patient not taking: Reported on 2022   DULoxetine (CYMBALTA) 30 mg delayed release capsule 2022 at Unknown time Self No Yes   Sig: Take 1 capsule (30 mg total) by mouth daily   Microlet Lancets MISC Not Taking at Unknown time Self Yes No   Patient not taking: Reported on 2022   atorvastatin (LIPITOR) 20 mg tablet 2022 at Unknown time  No Yes   Sig: Take 1 tablet (20 mg total) by mouth daily   benztropine (COGENTIN) 1 mg tablet Not Taking at Unknown time Self Yes No   Sig: Take 1 mg by mouth daily   Patient not taking: Reported on 2022   buPROPion (WELLBUTRIN SR) 150 mg 12 hr tablet Not Taking at Unknown time Self Yes No   Si mg daily     Patient not taking: Reported on 2022   cloNIDine (CATAPRES) 0 1 mg tablet Not Taking at Unknown time Self No No   Sig: Take 1 tablet (0 1 mg total) by mouth daily at bedtime   Patient not taking: Reported on 2022   clonazePAM (KlonoPIN) 0 5 mg tablet 2022 at Unknown time  No Yes   Sig: TAKE 1 TABLET(0 5 MG) BY MOUTH TWICE DAILY AS NEEDED FOR SEIZURES   ergocalciferol (VITAMIN D2) 50,000 units 2022 at Unknown time Self No Yes   Sig: TAKE 1 CAPSULE BY MOUTH 1 TIME A WEEK   folic acid (FOLVITE) 1 mg tablet Not Taking at Unknown time Self Yes No   Sig: Take by mouth daily Patient not taking: Reported on 6/19/2022   gabapentin (NEURONTIN) 400 mg capsule Not Taking at Unknown time Self No No   Sig: Take 2 capsules (800 mg total) by mouth 3 (three) times a day   Patient not taking: No sig reported   gabapentin (NEURONTIN) 800 mg tablet Not Taking at Unknown time  No No   Sig: TAKE 1 TABLET(800 MG) BY MOUTH THREE TIMES DAILY   Patient not taking: Reported on 6/19/2022   glucose blood test strip 6/19/2022 at Unknown time Self No Yes   Sig: Check sugars 5 times a week   hydrOXYzine HCL (ATARAX) 50 mg tablet Not Taking at Unknown time  No No   Sig: Take 1 tablet (50 mg total) by mouth 2 (two) times a day as needed for anxiety   Patient not taking: Reported on 6/19/2022   insulin glargine (Lantus SoloStar) 100 units/mL injection pen 6/19/2022 at Unknown time Self Yes Yes   Sig: Inject 14 Units under the skin daily     insulin isophane (HumuLIN N KwikPen) 100 units/mL injection pen 6/19/2022 at Unknown time  No Yes   Sig: Inject 30 Units under the skin 2 (two) times a day before meals   levothyroxine 25 mcg tablet 6/19/2022 at Unknown time Self No Yes   Sig: Take 1 tablet (25 mcg total) by mouth daily in the early morning   lisinopril-hydrochlorothiazide (PRINZIDE,ZESTORETIC) 10-12 5 MG per tablet 6/19/2022 at Unknown time  No Yes   Sig: TAKE 1 TABLET BY MOUTH DAILY   magnesium oxide (MAG-OX) 400 mg  Self No No   Sig: Take 1 tablet (400 mg total) by mouth daily for 14 days   metFORMIN (GLUCOPHAGE) 1000 MG tablet 6/19/2022 at Unknown time Self No Yes   Sig: TAKE 1 TABLET(1000 MG) BY MOUTH TWICE DAILY WITH MEALS   metoprolol tartrate (LOPRESSOR) 100 mg tablet 6/19/2022 at Unknown time  No Yes   Sig: Take 1 tablet (100 mg total) by mouth every 12 (twelve) hours   traZODone (DESYREL) 100 mg tablet 6/18/2022 at Unknown time  No Yes   Sig: TAKE 2 TABLETS(200 MG) BY MOUTH DAILY AT BEDTIME      Facility-Administered Medications: None       Past Medical History:   Diagnosis Date    Addiction to drug ([place] [address] )     Adjustment disorder     Alcohol abuse     Alcoholism ([place] [address] )     Anxiety     Bipolar disorder (HCC)     Bowel obstruction ([place] [address] )     Depression     Diabetes type 2, controlled ([place] [address] )     Disease of thyroid gland     Gastritis     Head injury     Heart palpitations     Hyperlipidemia     Hypertension     Hypothyroidism     Psychiatric illness     PTSD (post-traumatic stress disorder)     Seizure ([place] [address] )     Seizures ([place] [address] )     Sleep difficulties     Substance abuse ([place] [address] )     Suicide attempt ([place] [address] )     Withdrawal symptoms, drug or narcotic ([place] [address] )        Past Surgical History:   Procedure Laterality Date    ABDOMINAL SURGERY      APPENDECTOMY      BOWEL RESECTION      CHOLECYSTECTOMY      ESOPHAGOGASTRODUODENOSCOPY N/A 5/18/2018    Procedure: ESOPHAGOGASTRODUODENOSCOPY (EGD) with bx;  Surgeon: Joseph Alas DO;  Location: [facility]; Service: Gastroenterology    HYSTERECTOMY      KNEE SURGERY Bilateral        Family History   Problem Relation Age of Onset    Diabetes Father     Bipolar disorder Mother      I have reviewed and agree with the history as documented  E-Cigarette/Vaping    E-Cigarette Use Never User      E-Cigarette/Vaping Substances    Nicotine Yes     THC No     CBD No     Flavoring No     Other No     Unknown No      Social History     Tobacco Use    Smoking status: Current Every Day Smoker     Packs/day: 0 50     Years: 25 00     Pack years: 12 50     Types: Cigarettes    Smokeless tobacco: Never Used   Vaping Use    Vaping Use: Never used   Substance Use Topics    Alcohol use: Not Currently     Alcohol/week: 6 0 standard drinks     Types: 6 Cans of beer per week     Comment: last drink on 08/15/20    Drug use: No       Review of Systems   All other systems reviewed and are negative  Physical Exam  Physical Exam  Vitals and nursing note reviewed  Constitutional:       General: She is not in acute distress  Appearance: Normal appearance  She is well-developed  She is not ill-appearing, toxic-appearing or diaphoretic  HENT:      Head: Normocephalic and atraumatic  Right Ear: External ear normal       Left Ear: External ear normal       Nose: Nose normal       Mouth/Throat:      Mouth: Mucous membranes are moist    Eyes:      General:         Right eye: No discharge  Left eye: No discharge  Conjunctiva/sclera: Conjunctivae normal       Pupils: Pupils are equal, round, and reactive to light  Neck:      Vascular: No JVD  Cardiovascular:      Rate and Rhythm: Normal rate and regular rhythm  Pulses: Normal pulses  Heart sounds: Normal heart sounds  No murmur heard  No friction rub  No gallop  Pulmonary:      Effort: Pulmonary effort is normal  No respiratory distress  Breath sounds: No stridor  Wheezing present  No rhonchi or rales  Comments: Mild decreased air entry  Wheezing to most lung fields  transmitted upper airway sounds are appreciated  No distress  Chest:      Chest wall: No tenderness  Abdominal:      General: Abdomen is flat  Bowel sounds are normal  There is no distension  Palpations: Abdomen is soft  There is no mass  Tenderness: There is no abdominal tenderness  There is no guarding or rebound  Hernia: No hernia is present  Musculoskeletal:         General: No swelling, tenderness, deformity or signs of injury  Normal range of motion  Cervical back: Normal range of motion and neck supple  Right lower leg: No edema  Left lower leg: No edema  Skin:     General: Skin is warm and dry  Capillary Refill: Capillary refill takes less than 2 seconds  Coloration: Skin is not jaundiced or pale  Findings: No bruising, erythema, lesion or rash  Neurological:      General: No focal deficit present  Mental Status: She is alert and oriented to person, place, and time  Cranial Nerves: No cranial nerve deficit  Sensory: No sensory deficit  Motor: No weakness or abnormal muscle tone        Coordination: Coordination normal       Gait: Gait normal       Deep Tendon Reflexes: Reflexes normal          Vital Signs  ED Triage Vitals   Temperature Pulse Respirations Blood Pressure SpO2   06/19/22 1010 06/19/22 1010 06/19/22 1010 06/19/22 1010 06/19/22 1010   98 3 °F (36 8 °C) 86 (!) 24 (!) 193/99 96 %      Temp Source Heart Rate Source Patient Position - Orthostatic VS BP Location FiO2 (%)   06/19/22 1010 06/19/22 1322 06/19/22 1322 06/19/22 1322 --   Oral Monitor Lying Right arm       Pain Score       06/19/22 1010       No Pain           Vitals:    06/19/22 1322 06/19/22 1529 06/19/22 2210 06/20/22 0709   BP: 130/72 141/81 147/84 133/84   Pulse: 87 98 101 72   Patient Position - Orthostatic VS: Lying Lying Lying Lying         Visual Acuity      ED Medications  Medications   acetaminophen (TYLENOL) tablet 650 mg (650 mg Oral Given 6/20/22 0916)   nicotine (NICODERM CQ) 21 mg/24 hr TD 24 hr patch 1 patch (1 patch Transdermal Not Given 6/20/22 0919)   aluminum-magnesium hydroxide-simethicone (MYLANTA) oral suspension 30 mL (has no administration in time range)   levalbuterol (XOPENEX) inhalation solution 1 25 mg (1 25 mg Nebulization Given 6/20/22 0733)   levalbuterol (XOPENEX) inhalation solution 1 25 mg (has no administration in time range)     And   sodium chloride 0 9 % inhalation solution 3 mL (has no administration in time range)   ipratropium (ATROVENT) 0 02 % inhalation solution 0 5 mg (0 5 mg Nebulization Given 6/20/22 0734)   enoxaparin (LOVENOX) subcutaneous injection 40 mg (40 mg Subcutaneous Given 6/20/22 0917)   pantoprazole (PROTONIX) EC tablet 40 mg (40 mg Oral Given 6/20/22 0517)   atorvastatin (LIPITOR) tablet 20 mg (20 mg Oral Given 6/20/22 0916)   DULoxetine (CYMBALTA) delayed release capsule 30 mg (30 mg Oral Given 6/20/22 0915)   gabapentin (NEURONTIN) capsule 800 mg (800 mg Oral Given 6/20/22 1502)   levothyroxine tablet 25 mcg (25 mcg Oral Given 6/20/22 0517)   metoprolol tartrate (LOPRESSOR) tablet 100 mg (100 mg Oral Given 6/20/22 0916)   clonazePAM (KlonoPIN) tablet 0 5 mg (0 5 mg Oral Not Given 6/19/22 1633)   traZODone (DESYREL) tablet 200 mg (200 mg Oral Given 6/19/22 2207)   insulin lispro (HumaLOG) 100 units/mL subcutaneous injection 1-6 Units (4 Units Subcutaneous Given 6/20/22 0916)   insulin lispro (HumaLOG) 100 units/mL subcutaneous injection 2-12 Units (6 Units Subcutaneous Given 6/19/22 2207)   losartan (COZAAR) tablet 25 mg (25 mg Oral Given 6/20/22 0915)   hydrochlorothiazide (HYDRODIURIL) tablet 12 5 mg (12 5 mg Oral Given 6/20/22 0915)   methylPREDNISolone sodium succinate (Solu-MEDROL) injection 40 mg (has no administration in time range)   benzonatate (TESSALON PERLES) capsule 100 mg (has no administration in time range)   insulin glargine (LANTUS) subcutaneous injection 30 Units 0 3 mL (has no administration in time range)   dextromethorphan-guaiFENesin (ROBITUSSIN DM) oral syrup 10 mL (has no administration in time range)   methylPREDNISolone sodium succinate (Solu-MEDROL) injection 80 mg (80 mg Intravenous Given 6/19/22 1026)   albuterol inhalation solution 2 5 mg (2 5 mg Nebulization Given 6/19/22 1057)   albuterol inhalation solution 2 5 mg (2 5 mg Nebulization Given 6/19/22 1205)       Diagnostic Studies  Results Reviewed     Procedure Component Value Units Date/Time    HS Troponin I 2hr [[id number]]  (Normal) Collected: 06/19/22 1449    Lab Status: Final result Specimen: Blood from Arm, Right Updated: 06/19/22 1522     hs TnI 2hr 3 ng/L      Delta 2hr hsTnI 0 ng/L     HS Troponin 0hr (reflex protocol) [[id number]]  (Normal) Collected: 06/19/22 1048    Lab Status: Final result Specimen: Blood from Hand, Left Updated: 06/19/22 1118     hs TnI 0hr 3 ng/L     Comprehensive metabolic panel [[id number]] Collected: 06/19/22 1048    Lab Status: Final result Specimen: Blood from Arm, Right Updated: 06/19/22 1114     Sodium 138 mmol/L      Potassium 4 2 mmol/L      Chloride 102 mmol/L      CO2 26 mmol/L      ANION GAP 10 mmol/L      BUN 10 mg/dL      Creatinine 0 71 mg/dL      Glucose 140 mg/dL      Calcium 9 1 mg/dL      AST 19 U/L      ALT 29 U/L      Alkaline Phosphatase 98 U/L      Total Protein 6 9 g/dL      Albumin 4 2 g/dL      Total Bilirubin 0 54 mg/dL      eGFR 96 ml/min/1 73sq m     Narrative:      [place] guidelines for Chronic Kidney Disease (CKD):     Stage 1 with normal or high GFR (GFR > 90 mL/min/1 73 square meters)    Stage 2 Mild CKD (GFR = 60-89 mL/min/1 73 square meters)    Stage 3A Moderate CKD (GFR = 45-59 mL/min/1 73 square meters)    Stage 3B Moderate CKD (GFR = 30-44 mL/min/1 73 square meters)    Stage 4 Severe CKD (GFR = 15-29 mL/min/1 73 square meters)    Stage 5 End Stage CKD (GFR <15 mL/min/1 73 square meters)  Note: GFR calculation is accurate only with a steady state creatinine    COVID/FLU/RSV - 2 hour TAT [[id number]]  (Normal) Collected: 06/19/22 1017    Lab Status: Final result Specimen: Nares from Nasopharyngeal Swab Updated: 06/19/22 1103     SARS-CoV-2 Negative     INFLUENZA A PCR Negative     INFLUENZA B PCR Negative     RSV PCR Negative    Narrative:      FOR PEDIATRIC PATIENTS - copy/paste COVID Guidelines URL to browser: [email or web address]    SARS-CoV-2 assay is a Nucleic Acid Amplification assay intended for the  qualitative detection of nucleic acid from SARS-CoV-2 in nasopharyngeal  swabs  Results are for the presumptive identification of SARS-CoV-2 RNA  Positive results are indicative of infection with SARS-CoV-2, the virus  causing COVID-19, but do not rule out bacterial infection or co-infection  with other viruses  Laboratories within the [place] and its  territories are required to report all positive results to the appropriate  public health authorities   Negative results do not preclude SARS-CoV-2  infection and should not be used as the sole basis for treatment or other  patient management decisions  Negative results must be combined with  clinical observations, patient history, and epidemiological information  This test has not been FDA cleared or approved  This test has been authorized by FDA under an Emergency Use Authorization  (EUA)  This test is only authorized for the duration of time the  declaration that circumstances exist justifying the authorization of the  emergency use of an in vitro diagnostic tests for detection of SARS-CoV-2  virus and/or diagnosis of COVID-19 infection under section 564(b)(1) of  the Act, 21 U  S C  820FHO-3(Y)(8), unless the authorization is terminated  or revoked sooner  The test has been validated but independent review by FDA  and CLIA is pending  Test performed using [employer] GeneXpert: This RT-PCR assay targets N2,  a region unique to SARS-CoV-2  A conserved region in the E-gene was chosen  for pan-Sarbecovirus detection which includes SARS-CoV-2      Protime-INR [[id number]]  (Normal) Collected: 06/19/22 1017    Lab Status: Final result Specimen: Blood from Arm, Right Updated: 06/19/22 1056     Protime 11 9 seconds      INR 0 88    APTT [[id number]]  (Normal) Collected: 06/19/22 1017    Lab Status: Final result Specimen: Blood from Arm, Right Updated: 06/19/22 1056     PTT 30 seconds     CBC and differential [[id number]] Collected: 06/19/22 1017    Lab Status: Final result Specimen: Blood from Arm, Right Updated: 06/19/22 1023     WBC 9 23 Thousand/uL      RBC 4 71 Million/uL      Hemoglobin 14 0 g/dL      Hematocrit 42 2 %      MCV 90 fL      MCH 29 7 pg      MCHC 33 2 g/dL      RDW 13 4 %      MPV 9 8 fL      Platelets 146 Thousands/uL      nRBC 0 /100 WBCs      Neutrophils Relative 59 %      Immat GRANS % 0 %      Lymphocytes Relative 29 %      Monocytes Relative 11 %      Eosinophils Relative 1 %      Basophils Relative 0 %      Neutrophils Absolute 5 40 Thousands/µL      Immature Grans Absolute 0 03 Thousand/uL      Lymphocytes Absolute 2 65 Thousands/µL      Monocytes Absolute 1 00 Thousand/µL      Eosinophils Absolute 0 12 Thousand/µL      Basophils Absolute 0 03 Thousands/µL                  XR chest 1 view portable   Final Result by Mimi Haq MD (06/20 2068)      No acute cardiopulmonary disease  Workstation performed: [id number]                    Procedures  Procedures         ED Course                               SBIRT 20yo+    Flowsheet Row Most Recent Value   SBIRT (25 yo +)    In order to provide better care to our patients, we are screening all of our patients for alcohol and drug use  Would it be okay to ask you these screening questions? Yes Filed at: 06/19/2022 1031   Initial Alcohol Screen: US AUDIT-C     1  How often do you have a drink containing alcohol? 0 Filed at: 06/19/2022 1031   2  How many drinks containing alcohol do you have on a typical day you are drinking? 0 Filed at: 06/19/2022 1031   3a  Male UNDER 65: How often do you have five or more drinks on one occasion? 0 Filed at: 06/19/2022 1031   3b  FEMALE Any Age, or MALE 65+: How often do you have 4 or more drinks on one occassion? 0 Filed at: 06/19/2022 1031   Audit-C Score 0 Filed at: 06/19/2022 1031   MUMTAZ: How many times in the past year have you    Used an illegal drug or used a prescription medication for non-medical reasons? Never Filed at: 06/19/2022 1031                    MDM  Number of Diagnoses or Management Options  Bronchitis  Wheezing  Diagnosis management comments: cta on 3/28/22 negative  Unlikely pe on exam    Narrative & Impression  CTA - CHEST WITH IV CONTRAST - PULMONARY ANGIOGRAM  INDICATION:   CP, tachy, +dimer  COMPARISON: None  TECHNIQUE: CTA examination of the chest was performed using angiographic technique according to a protocol specifically tailored to evaluate for pulmonary embolism    Axial, sagittal, and coronal 2D reformatted images were created from the source data and   submitted for interpretation  In addition, coronal 3D MIP postprocessing was performed on the acquisition scanner  Radiation dose length product (DLP) for this visit:  676 mGy-cm   This examination, like all CT scans performed in the [facility], was performed utilizing techniques to minimize radiation dose exposure, including the use of iterative   reconstruction and automated exposure control  IV Contrast:  85 mL of iohexol (OMNIPAQUE)     FINDINGS:  PULMONARY ARTERIAL TREE:  No pulmonary embolus is seen  LUNGS:  Lungs are clear  There is no tracheal or endobronchial lesion  PLEURA:  Unremarkable  HEART/GREAT VESSELS:  Unremarkable for patient's age  No thoracic aortic aneurysm  MEDIASTINUM AND AMILCAR:  Unremarkable  CHEST WALL AND LOWER NECK:   Unremarkable  VISUALIZED STRUCTURES IN THE UPPER ABDOMEN:  Visualized hepatic parenchyma is diffusely decreased in density consistent with hepatic steatosis  Otherwise no clinical significant abnormality identified in the visualized upper abdomen  OSSEOUS STRUCTURES:  No acute fracture or destructive osseous lesion  IMPRESSION:  No evidence of pulmonary embolus  No evidence of acute intrathoracic pathology      To er with run down senastion, dry cough and congestion for three days  No fever  She did reported some chest tightness, entire chest, non radiating, non pleuritic and constant for two days  She is sob  She feels wheezy  At arrival she had mild decreased air entry, wheezing to all lung fields and some transmitted upper airway sounds  She states she does feel better sp neb  Recent cta, unlikely pe on exam  She does not appear fluid overloaded on exam  Sp nebs she is with good air entry, still some wheezing is noted bl  She no longer has any chest tightness sp neb tx she states  Her pulse ox is 91% with ambulation   Will plan to give another breathing tx and watch slightly longer in er  I do suspect she will improve and be able to go home  Now sats about 88-90 % on ra  She remains wheezy, does feel better  She has no copd, asthma hx, no neb machine at home  Will plan to obs for presumed bronchitis with wheezing vs undx copd hx     Labs are reassuring  No large obvious consolidation on my wet read of her cxr  ekg with some baseline artifact but does not appear grossly ischemic                  Disposition  Final diagnoses:   Bronchitis   Wheezing     Time reflects when diagnosis was documented in both MDM as applicable and the Disposition within this note     Time User Action Codes Description Comment    6/19/2022 11:47 AM Starlette Mower Add [J40] Bronchitis     6/19/2022 11:47 AM Starlette Mower Add [R06 2] Wheezing     6/20/2022 10:47 AM Hansel Lucas Add [J40] Bronchitis with acute wheezing       ED Disposition     ED Disposition   Admit    Condition   Stable    Date/Time   Sun Jun 19, 2022  1:08 PM    Comment   Case was discussed with emilie and the patient's admission status was agreed to be Admission Status: observation status to the service of Dr Errol Llanos of the July her affect is              Follow-up Information     Follow up With Specialties Details Why Contact Info    Lucinda Oconnell MD Internal Medicine Schedule an appointment as soon as possible for a visit in 3 days  [address]  [address] [place] [address]  [phone]            Current Discharge Medication List      CONTINUE these medications which have NOT CHANGED    Details   atorvastatin (LIPITOR) 20 mg tablet Take 1 tablet (20 mg total) by mouth daily  Qty: 90 tablet, Refills: 3    Associated Diagnoses: Sinus tachycardia      clonazePAM (KlonoPIN) 0 5 mg tablet TAKE 1 TABLET(0 5 MG) BY MOUTH TWICE DAILY AS NEEDED FOR SEIZURES  Qty: 60 tablet, Refills: 1    Associated Diagnoses: Bipolar I disorder with anxious distress (HCC)      DULoxetine (CYMBALTA) 30 mg delayed release capsule Take 1 capsule (30 mg total) by mouth daily  Qty: 30 capsule, Refills: 2    Associated Diagnoses: Bipolar disorder, current episode depressed, severe, without psychotic features ([facility])      ergocalciferol (VITAMIN D2) 50,000 units TAKE 1 CAPSULE BY MOUTH 1 TIME A WEEK  Qty: 12 capsule, Refills: 1    Comments: **Patient requests 90 days supply**  Associated Diagnoses: Vitamin D deficiency      glucose blood test strip Check sugars 5 times a week  Qty: 100 strip, Refills: 1    Comments: Patient needs Contour Next EZ Test Strips  Associated Diagnoses: Diabetes mellitus type 2 in obese ([facility])      insulin glargine (Lantus SoloStar) 100 units/mL injection pen Inject 14 Units under the skin daily        insulin isophane (HumuLIN N KwikPen) 100 units/mL injection pen Inject 30 Units under the skin 2 (two) times a day before meals  Qty: 15 mL, Refills: 5    Associated Diagnoses: Diabetes mellitus type 2 in obese ([facility])      levothyroxine 25 mcg tablet Take 1 tablet (25 mcg total) by mouth daily in the early morning  Qty: 7 tablet, Refills: 0    Associated Diagnoses: Hypothyroidism      lisinopril-hydrochlorothiazide (PRINZIDE,ZESTORETIC) 10-12 5 MG per tablet TAKE 1 TABLET BY MOUTH DAILY  Qty: 90 tablet, Refills: 1    Associated Diagnoses: Primary hypertension      metFORMIN (GLUCOPHAGE) 1000 MG tablet TAKE 1 TABLET(1000 MG) BY MOUTH TWICE DAILY WITH MEALS  Qty: 180 tablet, Refills: 1    Comments: **Patient requests 90 days supply**  Associated Diagnoses: Bipolar disorder, current episode depressed, severe, without psychotic features ([facility])      metoprolol tartrate (LOPRESSOR) 100 mg tablet Take 1 tablet (100 mg total) by mouth every 12 (twelve) hours  Qty: 180 tablet, Refills: 0    Associated Diagnoses: Primary hypertension      traZODone (DESYREL) 100 mg tablet TAKE 2 TABLETS(200 MG) BY MOUTH DAILY AT BEDTIME  Qty: 60 tablet, Refills: 2    Associated Diagnoses: Bipolar I disorder with anxious distress ([facility])      ARIPiprazole (ABILIFY) 5 mg tablet TAKE 1 TABLET(5 MG) BY MOUTH DAILY  Qty: 90 tablet, Refills: 1    Comments: **Patient requests 90 days supply**  Associated Diagnoses: Bipolar disorder, current episode depressed, severe, without psychotic features (HCC)      benztropine (COGENTIN) 1 mg tablet Take 1 mg by mouth daily      buPROPion (WELLBUTRIN SR) 150 mg 12 hr tablet 150 mg daily        cloNIDine (CATAPRES) 0 1 mg tablet Take 1 tablet (0 1 mg total) by mouth daily at bedtime  Qty: 30 tablet, Refills: 1    Associated Diagnoses: Essential hypertension; Post-traumatic stress disorder, chronic      folic acid (FOLVITE) 1 mg tablet Take by mouth daily        gabapentin (NEURONTIN) 400 mg capsule Take 2 capsules (800 mg total) by mouth 3 (three) times a day  Qty: 90 capsule, Refills: 0    Associated Diagnoses: Bipolar disorder, current episode depressed, severe, without psychotic features (HCC)      gabapentin (NEURONTIN) 800 mg tablet TAKE 1 TABLET(800 MG) BY MOUTH THREE TIMES DAILY  Qty: 90 tablet, Refills: 2    Associated Diagnoses: Bipolar I disorder with anxious distress (HCC)      hydrOXYzine HCL (ATARAX) 50 mg tablet Take 1 tablet (50 mg total) by mouth 2 (two) times a day as needed for anxiety  Qty: 30 tablet, Refills: 3    Associated Diagnoses: Bipolar disorder, current episode depressed, severe, without psychotic features (HCC)      magnesium oxide (MAG-OX) 400 mg Take 1 tablet (400 mg total) by mouth daily for 14 days  Qty: 14 tablet, Refills: 0    Associated Diagnoses: Hypomagnesemia      Microlet Lancets MISC              No discharge procedures on file      PDMP Review       Value Time User    PDMP Reviewed  Yes 5/10/2022  5:28 PM Krunal Mccormick MD          ED Provider  Electronically Signed by           Emmy Mccain MD  06/20/22 8414

## 2022-06-19 NOTE — ASSESSMENT & PLAN NOTE
Smokes 1 pack of cigarettes a day, patient is willing to quit smoking  I have explained risks of developing COPD and cancer in she understands the risks    Agreeable for nicotine patch

## 2022-06-19 NOTE — PLAN OF CARE
Problem: PAIN - ADULT  Goal: Verbalizes/displays adequate comfort level or baseline comfort level  Description: Interventions:  - Encourage patient to monitor pain and request assistance  - Assess pain using appropriate pain scale  - Administer analgesics based on type and severity of pain and evaluate response  - Implement non-pharmacological measures as appropriate and evaluate response  - Consider cultural and social influences on pain and pain management  - Notify physician/advanced practitioner if interventions unsuccessful or patient reports new pain  Outcome: Progressing     Problem: INFECTION - ADULT  Goal: Absence or prevention of progression during hospitalization  Description: INTERVENTIONS:  - Assess and monitor for signs and symptoms of infection  - Monitor lab/diagnostic results  - Monitor all insertion sites, i e  indwelling lines, tubes, and drains  - Monitor endotracheal if appropriate and nasal secretions for changes in amount and color  - [place] appropriate cooling/warming therapies per order  - Administer medications as ordered  - Instruct and encourage patient and family to use good hand hygiene technique  - Identify and instruct in appropriate isolation precautions for identified infection/condition  Outcome: Progressing     Problem: DISCHARGE PLANNING  Goal: Discharge to home or other facility with appropriate resources  Description: INTERVENTIONS:  - Identify barriers to discharge w/patient and caregiver  - Arrange for needed discharge resources and transportation as appropriate  - Identify discharge learning needs (meds, wound care, etc )  - Arrange for interpretive services to assist at discharge as needed  - Refer to Case Management Department for coordinating discharge planning if the patient needs post-hospital services based on physician/advanced practitioner order or complex needs related to functional status, cognitive ability, or social support system  Outcome: Progressing Problem: RESPIRATORY - ADULT  Goal: Achieves optimal ventilation and oxygenation  Description: INTERVENTIONS:  - Assess for changes in respiratory status  - Assess for changes in mentation and behavior  - Position to facilitate oxygenation and minimize respiratory effort  - Oxygen administered by appropriate delivery if ordered  - Initiate smoking cessation education as indicated  - Encourage broncho-pulmonary hygiene including cough, deep breathe, Incentive Spirometry  - Assess the need for suctioning and aspirate as needed  - Assess and instruct to report SOB or any respiratory difficulty  - Respiratory Therapy support as indicated  Outcome: Progressing

## 2022-06-19 NOTE — ASSESSMENT & PLAN NOTE
Lab Results   Component Value Date    HGBA1C 8 6 (H) 07/01/2021   Hold metformin, continue Lantus, give sliding scale insulin    Patient states she takes 30 units of Lantus at home, will give 20 units of Lantus and titrate upwards if required while on steroids

## 2022-06-20 PROBLEM — R05.3 CHRONIC COUGH: Status: ACTIVE | Noted: 2022-06-20

## 2022-06-20 PROBLEM — J40 BRONCHITIS WITH ACUTE WHEEZING: Status: ACTIVE | Noted: 2022-06-19

## 2022-06-20 LAB
ATRIAL RATE: 82 BPM
GLUCOSE SERPL-MCNC: 201 MG/DL (ref 65–140)
GLUCOSE SERPL-MCNC: 274 MG/DL (ref 65–140)
GLUCOSE SERPL-MCNC: 284 MG/DL (ref 65–140)
GLUCOSE SERPL-MCNC: 313 MG/DL (ref 65–140)
P AXIS: 58 DEGREES
PR INTERVAL: 184 MS
QRS AXIS: 27 DEGREES
QRSD INTERVAL: 70 MS
QT INTERVAL: 394 MS
QTC INTERVAL: 460 MS
T WAVE AXIS: 36 DEGREES
VENTRICULAR RATE: 82 BPM

## 2022-06-20 PROCEDURE — 94760 N-INVAS EAR/PLS OXIMETRY 1: CPT

## 2022-06-20 PROCEDURE — 99232 SBSQ HOSP IP/OBS MODERATE 35: CPT | Performed by: INTERNAL MEDICINE

## 2022-06-20 PROCEDURE — 82948 REAGENT STRIP/BLOOD GLUCOSE: CPT

## 2022-06-20 PROCEDURE — 94640 AIRWAY INHALATION TREATMENT: CPT

## 2022-06-20 PROCEDURE — 93010 ELECTROCARDIOGRAM REPORT: CPT | Performed by: INTERNAL MEDICINE

## 2022-06-20 RX ORDER — BENZONATATE 100 MG/1
100 CAPSULE ORAL 3 TIMES DAILY PRN
Status: DISCONTINUED | OUTPATIENT
Start: 2022-06-20 | End: 2022-06-22 | Stop reason: HOSPADM

## 2022-06-20 RX ORDER — LOSARTAN POTASSIUM 25 MG/1
25 TABLET ORAL DAILY
Status: DISCONTINUED | OUTPATIENT
Start: 2022-06-20 | End: 2022-06-22 | Stop reason: HOSPADM

## 2022-06-20 RX ORDER — GUAIFENESIN/DEXTROMETHORPHAN 100-10MG/5
10 SYRUP ORAL EVERY 6 HOURS SCHEDULED
Status: DISCONTINUED | OUTPATIENT
Start: 2022-06-20 | End: 2022-06-22 | Stop reason: HOSPADM

## 2022-06-20 RX ORDER — HYDROCHLOROTHIAZIDE 12.5 MG/1
12.5 TABLET ORAL DAILY
Status: DISCONTINUED | OUTPATIENT
Start: 2022-06-20 | End: 2022-06-22 | Stop reason: HOSPADM

## 2022-06-20 RX ORDER — INSULIN GLARGINE 100 [IU]/ML
30 INJECTION, SOLUTION SUBCUTANEOUS
Status: DISCONTINUED | OUTPATIENT
Start: 2022-06-20 | End: 2022-06-22 | Stop reason: HOSPADM

## 2022-06-20 RX ORDER — METHYLPREDNISOLONE SODIUM SUCCINATE 40 MG/ML
40 INJECTION, POWDER, LYOPHILIZED, FOR SOLUTION INTRAMUSCULAR; INTRAVENOUS EVERY 12 HOURS SCHEDULED
Status: DISCONTINUED | OUTPATIENT
Start: 2022-06-20 | End: 2022-06-22 | Stop reason: HOSPADM

## 2022-06-20 RX ORDER — GUAIFENESIN 100 MG/5ML
200 SOLUTION ORAL EVERY 8 HOURS SCHEDULED
Status: DISCONTINUED | OUTPATIENT
Start: 2022-06-20 | End: 2022-06-20

## 2022-06-20 RX ADMIN — IPRATROPIUM BROMIDE 0.5 MG: 0.5 SOLUTION RESPIRATORY (INHALATION) at 19:53

## 2022-06-20 RX ADMIN — LEVALBUTEROL HYDROCHLORIDE 1.25 MG: 1.25 SOLUTION RESPIRATORY (INHALATION) at 19:53

## 2022-06-20 RX ADMIN — LEVALBUTEROL HYDROCHLORIDE 1.25 MG: 1.25 SOLUTION RESPIRATORY (INHALATION) at 13:06

## 2022-06-20 RX ADMIN — TRAZODONE HYDROCHLORIDE 200 MG: 100 TABLET ORAL at 21:32

## 2022-06-20 RX ADMIN — METOPROLOL TARTRATE 100 MG: 50 TABLET, FILM COATED ORAL at 09:16

## 2022-06-20 RX ADMIN — METOPROLOL TARTRATE 100 MG: 50 TABLET, FILM COATED ORAL at 21:32

## 2022-06-20 RX ADMIN — IPRATROPIUM BROMIDE 0.5 MG: 0.5 SOLUTION RESPIRATORY (INHALATION) at 07:34

## 2022-06-20 RX ADMIN — GUAIFENESIN AND DEXTROMETHORPHAN 10 ML: 100; 10 SYRUP ORAL at 12:35

## 2022-06-20 RX ADMIN — DULOXETINE HYDROCHLORIDE 30 MG: 30 CAPSULE, DELAYED RELEASE ORAL at 09:15

## 2022-06-20 RX ADMIN — INSULIN LISPRO 4 UNITS: 100 INJECTION, SOLUTION INTRAVENOUS; SUBCUTANEOUS at 21:33

## 2022-06-20 RX ADMIN — ENOXAPARIN SODIUM 40 MG: 40 INJECTION SUBCUTANEOUS at 09:17

## 2022-06-20 RX ADMIN — INSULIN LISPRO 5 UNITS: 100 INJECTION, SOLUTION INTRAVENOUS; SUBCUTANEOUS at 12:35

## 2022-06-20 RX ADMIN — ACETAMINOPHEN 650 MG: 325 TABLET ORAL at 09:16

## 2022-06-20 RX ADMIN — IPRATROPIUM BROMIDE 0.5 MG: 0.5 SOLUTION RESPIRATORY (INHALATION) at 13:06

## 2022-06-20 RX ADMIN — INSULIN LISPRO 4 UNITS: 100 INJECTION, SOLUTION INTRAVENOUS; SUBCUTANEOUS at 17:54

## 2022-06-20 RX ADMIN — LEVALBUTEROL HYDROCHLORIDE 1.25 MG: 1.25 SOLUTION RESPIRATORY (INHALATION) at 07:33

## 2022-06-20 RX ADMIN — LEVOTHYROXINE SODIUM 25 MCG: 25 TABLET ORAL at 05:17

## 2022-06-20 RX ADMIN — HYDROCHLOROTHIAZIDE 12.5 MG: 12.5 TABLET ORAL at 09:15

## 2022-06-20 RX ADMIN — GABAPENTIN 800 MG: 400 CAPSULE ORAL at 21:32

## 2022-06-20 RX ADMIN — METHYLPREDNISOLONE SODIUM SUCCINATE 40 MG: 40 INJECTION, POWDER, FOR SOLUTION INTRAMUSCULAR; INTRAVENOUS at 05:18

## 2022-06-20 RX ADMIN — INSULIN GLARGINE 30 UNITS: 100 INJECTION, SOLUTION SUBCUTANEOUS at 21:33

## 2022-06-20 RX ADMIN — METHYLPREDNISOLONE SODIUM SUCCINATE 40 MG: 40 INJECTION, POWDER, FOR SOLUTION INTRAMUSCULAR; INTRAVENOUS at 21:35

## 2022-06-20 RX ADMIN — GUAIFENESIN 200 MG: 200 SOLUTION ORAL at 09:15

## 2022-06-20 RX ADMIN — LOSARTAN POTASSIUM 25 MG: 25 TABLET, FILM COATED ORAL at 09:15

## 2022-06-20 RX ADMIN — INSULIN LISPRO 4 UNITS: 100 INJECTION, SOLUTION INTRAVENOUS; SUBCUTANEOUS at 09:16

## 2022-06-20 RX ADMIN — ATORVASTATIN CALCIUM 20 MG: 20 TABLET, FILM COATED ORAL at 09:16

## 2022-06-20 RX ADMIN — GABAPENTIN 800 MG: 400 CAPSULE ORAL at 17:54

## 2022-06-20 RX ADMIN — GABAPENTIN 800 MG: 400 CAPSULE ORAL at 09:15

## 2022-06-20 RX ADMIN — GUAIFENESIN AND DEXTROMETHORPHAN 10 ML: 100; 10 SYRUP ORAL at 17:54

## 2022-06-20 RX ADMIN — PANTOPRAZOLE SODIUM 40 MG: 40 TABLET, DELAYED RELEASE ORAL at 05:17

## 2022-06-20 NOTE — PLAN OF CARE
Problem: PAIN - ADULT  Goal: Verbalizes/displays adequate comfort level or baseline comfort level  Description: Interventions:  - Encourage patient to monitor pain and request assistance  - Assess pain using appropriate pain scale  - Administer analgesics based on type and severity of pain and evaluate response  - Implement non-pharmacological measures as appropriate and evaluate response  - Consider cultural and social influences on pain and pain management  - Notify physician/advanced practitioner if interventions unsuccessful or patient reports new pain  Outcome: Progressing     Problem: INFECTION - ADULT  Goal: Absence or prevention of progression during hospitalization  Description: INTERVENTIONS:  - Assess and monitor for signs and symptoms of infection  - Monitor lab/diagnostic results  - Monitor all insertion sites, i e  indwelling lines, tubes, and drains  - Monitor endotracheal if appropriate and nasal secretions for changes in amount and color  - [place] appropriate cooling/warming therapies per order  - Administer medications as ordered  - Instruct and encourage patient and family to use good hand hygiene technique  - Identify and instruct in appropriate isolation precautions for identified infection/condition  Outcome: Progressing     Problem: DISCHARGE PLANNING  Goal: Discharge to home or other facility with appropriate resources  Description: INTERVENTIONS:  - Identify barriers to discharge w/patient and caregiver  - Arrange for needed discharge resources and transportation as appropriate  - Identify discharge learning needs (meds, wound care, etc )  - Arrange for interpretive services to assist at discharge as needed  - Refer to Case Management Department for coordinating discharge planning if the patient needs post-hospital services based on physician/advanced practitioner order or complex needs related to functional status, cognitive ability, or social support system  Outcome: Progressing Problem: RESPIRATORY - ADULT  Goal: Achieves optimal ventilation and oxygenation  Description: INTERVENTIONS:  - Assess for changes in respiratory status  - Assess for changes in mentation and behavior  - Position to facilitate oxygenation and minimize respiratory effort  - Oxygen administered by appropriate delivery if ordered  - Initiate smoking cessation education as indicated  - Encourage broncho-pulmonary hygiene including cough, deep breathe, Incentive Spirometry  - Assess the need for suctioning and aspirate as needed  - Assess and instruct to report SOB or any respiratory difficulty  - Respiratory Therapy support as indicated  Outcome: Progressing

## 2022-06-20 NOTE — ASSESSMENT & PLAN NOTE
H/o chronic dry cough for > 2 years after starting lisinopril  Will hold lisinopril for now  Start losartan low-dose    Follow-up PCP outpatient

## 2022-06-20 NOTE — UTILIZATION REVIEW
Initial Clinical Review  WAS OBSERVATION 6/19/22 @ 1309  CONVERTED TO INPATIENT ADMISSION 6/20/22 1053  DUE TO CONTINUED STAY REQUIRED TO CARE FOR PATIENT WITH  BRONCHITIS WITH ACUTE WHEEZING REQUIRING FURTHER TREATMENT WITH IV STEROIDS    Admission: Date/Time/Statement:   Admission Orders (From admission, onward)     Ordered        06/20/22 1053  Inpatient Admission  Once            06/19/22 1309  Place in Observation  Once                      Orders Placed This Encounter   Procedures    Inpatient Admission     Standing Status:   Standing     Number of Occurrences:   1     Order Specific Question:   Level of Care     Answer:   Med Surg [16]     Order Specific Question:   Estimated length of stay     Answer:   More than 2 Midnights     Order Specific Question:   Certification     Answer:   I certify that inpatient services are medically necessary for this patient for a duration of greater than two midnights  See H&P and MD Progress Notes for additional information about the patient's course of treatment  ED Arrival Information     Expected   -    Arrival   6/19/2022 10:03    Acuity   Emergent            Means of arrival   Walk-In    Escorted by   [place]    Service   Hospitalist    Admission type   Emergency            Arrival complaint   Wheezing, coughing           Chief Complaint   Patient presents with    Wheezing     C/o wheezing and cough x 3 days     OBSERVATION:  Initial Presentation: 49 y o  female W/PMHX: GERD c/w ppi, HLD c/w statin, hypothyroidism c/w levothyroxine,  Generalized anxiety disorder c/w home meds as needed for anxiety, T2DM hold metformin c/w lantus and ssi, cigarette smoker NRP,  to ED from Home, admitted as Observation due to SOB  Presented with wheezing and SOB, acute viral bronchitis  No prior h/o asthma or copd  S/S started 3 days ago,  With cough persistent and non productive,  congestion wheezing since las night  C/o chest tightness   Exam: B/L air entry present, wheezing in B/L lung fields,  02 sat 96%, hypertensive on arrival, ED work up reveals unremarkable, treatment in ED started with  Duo neb, and multiple nebs, IV steroids  Improved with therapies  Plan: c/w IV steroids, and nebulizer treatments, out patient PFTs, trend clinical needs, trend vss, trend serial labs with repletion as needed, dvt ppx  INPATIENT:     Date: 6/20/22 : Persistent wheezing, SOB with generalized weakness, decrease IV solumedrol  To bid, c/w duo nebs, I/S, smoking cessation encouraged  H/o chronic cough for >2 yrs, after starting Lisinopril,  hold Lisinopril for now, start low dose Losartan  And will need to f/u with pcp for medication check for B/P  C/W accu-ck and ssi coverage d/t IV steroid elevation in BS  Feeling improved however not back to baseline  C/w trending serial labs and repletion as needed  dvt ppx, respiratory therapy support,     Date 6/21/22 Day 2: The patient will continue to require additional inpatient hospital stay due to persistent wheezing, requring IV steroids  Fine expiratory wheezing, c/w IV steroids, duo neb, Pulmonary consult, smoking cessation, DM2: hyperglycemia 2/2 steroid induced add humalog with meals c/w diabetic diet  accu cks and ssi, b/p mildly elevated c/w hctz and losartan, c/w trending b/p, chronic cough f/u outpt with pcp ? 2/2 losartan for medication adjustment  Trend respiratory needs, I/S oob as tolerated, 02 sat 95-99% on RA  C/w trending serial labs with repletion as needed, dvt ppx  Pulmonary Consult: Acute tracheobronchitis: most likely acute laryngotracheobronchitis:viral   C/w upper airway sounds and wheezing,  Has received IV solumedrol and bronchodialotor suggest ENT consult  COPD: smoker for more than 20yrs  At least 1ppd  CT reveals structural emphysema  Has not been on inhaler or 02  C/w IV steroids, and Duo Nebs, needs PFt as and Outpatient,  Would need to be d/c on Breo 100 or equivalent inhaler    ROSA h/o snoring daytime sleepiness and tiredness  + oropharyngeal crowding  Needs outpt sleep study  Obesity need for wt reduction understood by pt  Tobacco dependencee had been a former smoker for more than 20yrs  Smoking 1ppd  Cough and SOB and wheezing partly d/t COPD  Needs to zeke smoking completely  F/u in pulmonary office outpt         ED Triage Vitals   Temperature Pulse Respirations Blood Pressure SpO2   06/19/22 1010 06/19/22 1010 06/19/22 1010 06/19/22 1010 06/19/22 1010   98 3 °F (36 8 °C) 86 (!) 24 (!) 193/99 96 %      Temp Source Heart Rate Source Patient Position - Orthostatic VS BP Location FiO2 (%)   06/19/22 1010 06/19/22 1322 06/19/22 1322 06/19/22 1322 --   Oral Monitor Lying Right arm       Pain Score       06/19/22 1010       No Pain          Wt Readings from Last 1 Encounters:   06/19/22 88 5 kg (195 lb)     Additional Vital Signs:   Date/Time Temp Pulse Resp BP MAP (mmHg) SpO2 Calculated FIO2 (%) - Nasal Cannula O2 Flow Rate (L/min) Nasal Cannula O2 Flow Rate (L/min) O2 Device Patient Position - Orthostatic VS   06/21/22 1352 -- -- -- -- -- 95 % -- -- -- None (Room air) --   06/21/22 0758 96 7 °F (35 9 °C) Abnormal  62 17 158/84 -- 99 % -- -- -- -- Lying   06/21/22 0745 -- -- -- -- -- 96 % -- -- -- None (Room air) --   06/20/22 2131 98 °F (36 7 °C) 77 -- 135/77 -- -- -- -- -- -- --   06/20/22 2011 -- -- -- -- -- 94 % -- -- -- None (Room air) --   06/20/22 1954 -- -- -- -- -- 94 % -- -- -- None (Room air) --   06/20/22 1804 -- -- -- -- -- 96 % -- -- -- None (Room air) --   06/20/22 1516 98 3 °F (36 8 °C) 69 16 114/61 -- 95 % -- -- -- -- Lying   06/20/22 1308 -- -- -- -- -- 96 % -- -- -- -- --   06/20/22 1047 -- -- -- -- -- 96 % -- 2 L/min -- Nasal cannula        Calculated FIO2 (%) - Nasal Cannula Nasal Cannula O2 Flow Rate (L/min) O2 Device Patient Position - Orthostatic VS          06/20/22 0735 -- -- -- -- -- 96 % -- -- -- --   06/20/22 0709 97 8 °F (36 6 °C) 72 16 133/84 -- 96 % -- -- -- Lying   06/19/22 2210 98 2 °F (36 8 °C) 101 20 147/84 109 96 % 28 2 L/min Nasal cannula Lying   06/19/22 2003 -- -- -- -- -- 98 % 28 2 L/min Nasal cannula --   06/19/22 1529 98 4 °F (36 9 °C) 98 19 141/81 -- 96 % 32 3 L/min Nasal cannula Lying   06/19/22 1425 -- -- -- -- -- 98 % -- -- -- --   06/19/22 1400 -- -- -- -- -- -- 32 3 L/min Nasal cannula --   06/19/22 1322 -- 87 20 130/72 -- 96 % 32 3 L/min Nasal cannula Lying   06/19/22 1255 -- -- -- -- -- 88 % Abnormal   -- -- None (Room air) --   SpO2: while sleeping at 06/19/22 1255   06/19/22 1121 -- 86 -- 154/86 -- 98 % -- -- -- --       Pertinent Labs/Diagnostic Test Results:   6/19/22 EKG: Normal sinus rhythm  Cannot rule out Septal infarct (cited on or before 28-MAR-2022)  Abnormal ECG  When compared with ECG of 28-MAR-2022 15:19,  Nonspecific T wave abnormality no longer evident in Anterior leads  XR chest 1 view portable   Final Result by Ladarius Avila MD (06/20 6183)      No acute cardiopulmonary disease                    Workstation performed: [id number]           Results from last 7 days   Lab Units 06/19/22  1017   SARS-COV-2  Negative     Results from last 7 days   Lab Units 06/19/22  1017   WBC Thousand/uL 9 23   HEMOGLOBIN g/dL 14 0   HEMATOCRIT % 42 2   PLATELETS Thousands/uL 315   NEUTROS ABS Thousands/µL 5 40         Results from last 7 days   Lab Units 06/19/22  1048   SODIUM mmol/L 138   POTASSIUM mmol/L 4 2   CHLORIDE mmol/L 102   CO2 mmol/L 26   ANION GAP mmol/L 10   BUN mg/dL 10   CREATININE mg/dL 0 71   EGFR ml/min/1 73sq m 96   CALCIUM mg/dL 9 1     Results from last 7 days   Lab Units 06/19/22  1048   AST U/L 19   ALT U/L 29   ALK PHOS U/L 98   TOTAL PROTEIN g/dL 6 9   ALBUMIN g/dL 4 2   TOTAL BILIRUBIN mg/dL 0 54     Results from last 7 days   Lab Units 06/20/22  1049 06/20/22  0712 06/19/22  2036 06/19/22  1554   POC GLUCOSE mg/dl 313* 284* 277* 261*     Results from last 7 days   Lab Units 06/19/22  1048   GLUCOSE RANDOM mg/dL 140            Results from last 7 days   Lab Units 06/19/22  1449 06/19/22  1048   HS TNI 0HR ng/L  --  3   HS TNI 2HR ng/L 3  --    HSTNI D2 ng/L 0  --          Results from last 7 days   Lab Units 06/19/22  1017   PROTIME seconds 11 9   INR  0 88   PTT seconds 30       Results from last 7 days   Lab Units 06/19/22  1017   INFLUENZA A PCR  Negative   INFLUENZA B PCR  Negative   RSV PCR  Negative             ED Treatment:   Medication Administration from 06/19/2022 1003 to 06/19/2022 1357       Date/Time Order Dose Route Action     06/19/2022 1016 ipratropium-albuterol (DUO-NEB) 0 5-2 5 mg/3 mL inhalation solution 3 mL 3 mL Nebulization Given     06/19/2022 1026 methylPREDNISolone sodium succinate (Solu-MEDROL) injection 80 mg 80 mg Intravenous Given     06/19/2022 1057 albuterol inhalation solution 2 5 mg 2 5 mg Nebulization Given     06/19/2022 1205 albuterol inhalation solution 2 5 mg 2 5 mg Nebulization Given        Past Medical History:   Diagnosis Date    Addiction to drug ([place] [address] )     Adjustment disorder     Alcohol abuse     Alcoholism ([place] [address] )     Anxiety     Bipolar disorder ([place] [address] )     Bowel obstruction ([facility] )     Depression     Diabetes type 2, controlled ([place] [address] )     Disease of thyroid gland     Gastritis     Head injury     Heart palpitations     Hyperlipidemia     Hypertension     Hypothyroidism     Psychiatric illness     PTSD (post-traumatic stress disorder)     Seizure ([facility] )     Seizures ([facility] )     Sleep difficulties     Substance abuse ([place] [address] )     Suicide attempt ([place] [address] )     Withdrawal symptoms, drug or narcotic ([facility] )      Present on Admission:   Generalized anxiety disorder   Acquired hypothyroidism   Gastroesophageal reflux disease without esophagitis   Diabetes mellitus type 2 in obese ([facility] )   Bronchitis with acute wheezing   Hyperlipemia   Cigarette smoker   Chronic cough   Essential hypertension      Admitting Diagnosis: Wheezing [R06 2]  Bronchitis [J40]  Age/Sex: 49 y o  female  Admission Orders: up and oob as tolerated, scd,   Scheduled Medications:  atorvastatin, 20 mg, Oral, Daily  DULoxetine, 30 mg, Oral, Daily  enoxaparin, 40 mg, Subcutaneous, Daily  gabapentin, 800 mg, Oral, TID  guaiFENesin, 200 mg, Oral, Q8H NACHO  hydrochlorothiazide, 12 5 mg, Oral, Daily  insulin glargine, 20 Units, Subcutaneous, HS  insulin lispro, 1-6 Units, Subcutaneous, TID AC  insulin lispro, 2-12 Units, Subcutaneous, HS  ipratropium, 0 5 mg, Nebulization, TID  levalbuterol, 1 25 mg, Nebulization, TID  levothyroxine, 25 mcg, Oral, Early Morning  losartan, 25 mg, Oral, Daily  methylPREDNISolone sodium succinate, 40 mg, Intravenous, Q12H NACHO  metoprolol tartrate, 100 mg, Oral, Q12H NACHO  nicotine, 1 patch, Transdermal, Daily  pantoprazole, 40 mg, Oral, Early Morning  traZODone, 200 mg, Oral, HS      Continuous IV Infusions: NONE      PRN Meds:  acetaminophen, 650 mg, Oral, Q6H PRN  aluminum-magnesium hydroxide-simethicone, 30 mL, Oral, Q6H PRN  benzonatate, 100 mg, Oral, TID PRN  clonazePAM, 0 5 mg, Oral, BID PRN  levalbuterol, 1 25 mg, Nebulization, Q6H PRN   And  sodium chloride, 3 mL, Nebulization, Q6H PRN        Network Utilization Review Department  ATTENTION: Please call with any questions or concerns to [phone] and carefully listen to the prompts so that you are directed to the right person  All voicemails are confidential   Milka Bhagat all requests for admission clinical reviews, approved or denied determinations and any other requests to dedicated fax number below belonging to the campus where the patient is receiving treatment   List of dedicated fax numbers for the Facilities:  [address] DENIALS (Administrative/Medical Necessity) [phone]   [address] (Maternity/NICU/Pediatrics) 261 [facility],[address] [facility] [address] [address] [address] [place] [address] [phone] [place] [address] [phone]

## 2022-06-20 NOTE — PROGRESS NOTES
[address]  Progress Note Emre Willett 1965, 49 y o  female MRN: [id number]  Unit/Bed#: -01 Encounter: [id number]  Primary Care Provider: Soumya Goodrich MD   Date and time admitted to hospital: 6/19/2022 10:06 AM    * Bronchitis with acute wheezing  Assessment & Plan  Presented with progressing SOB, wheezing suspected from viral bronchitis  No prior H/o COPD, asthma  Good baseline functional status given Pt can not ambulate up hill w/o any difficulty  -ve for COVID/RSV/influenza  CXR w/o any jeovanny consolidation  No URTI symptoms  Continues to have wheezing, SOB, generalized weakness today but has improved since yesterday  Decrease Solu-Medrol IV to b i d  Continue albuterol, Xopenex nebs  Consult Pulmonary  Respiratory protocol  Incentive spirometry  Robitussin q 6h  Tessalon Perles p r n  Will probably need PFTs on discharge  Smoking cessation was encouraged    Chronic cough  Assessment & Plan  H/o chronic dry cough for > 2 years after starting lisinopril  Will hold lisinopril for now  Start losartan low-dose  Follow-up PCP outpatient    Cigarette smoker  Assessment & Plan  Smokes 1 pack of cigarettes a day, patient is willing to quit smoking  I have explained risks of developing COPD and cancer in she understands the risks  Agreeable for nicotine patch    Diabetes mellitus type 2 in obese [facility])  Assessment & Plan    Lab Results   Component Value Date    HGBA1C 8 6 (H) 07/01/2021   Hold metformin, continue Lantus, give sliding scale insulin  Continue Lantus 30 units HS with SSC    Gastroesophageal reflux disease without esophagitis  Assessment & Plan  Currently not on any medication, given her wheezing will start her on pantoprazole  Hyperlipemia  Assessment & Plan  Continue statin    Acquired hypothyroidism  Assessment & Plan  Continue levothyroxine      Generalized anxiety disorder  Assessment & Plan  Patient on multiple medications however she only takes gabapentin 800 mg 3 times a day and Klonopin 0 5 mg b i d  As needed for anxiety  Patient states she stopped all other medications  Essential hypertension  Assessment & Plan  Continue HCTZ low-dose, start losartan, DC lisinopril        Pharmacologic: Enoxaparin (Lovenox)  Mechanical VTE Prophylaxis in Place: Yes    Discussions with Specialists or Other Care Team Provider:  None    Education and Discussions with Family / Patient:  Try to call son Lorenzo Posadas  Was unsuccessful  Lt voicemail  Will try to call back again later today  Current Length of Stay: 0 day(s)    Current Patient Status: Inpatient     Discharge Plan / Estimated Discharge Date:  Likely in 24 hours  Code Status: Level 1 - Full Code      Subjective:   Pt seen and examined  Communicated clearly  No particular overnight event reported  Hemodynamically stable and afebrile  Patient states improvement in SOB and wheezing since admission  Continues to have generalized weakness  Decreased appetite  Feeling improved however not back to baseline  Still noted to have wheezing this morning  Objective:     Vitals:   Temp (24hrs), Av 1 °F (36 7 °C), Min:97 8 °F (36 6 °C), Max:98 4 °F (36 9 °C)    Temp:  [97 8 °F (36 6 °C)-98 4 °F (36 9 °C)] 97 8 °F (36 6 °C)  HR:  [] 72  Resp:  [16-20] 16  BP: (130-154)/(72-86) 133/84  SpO2:  [88 %-98 %] 96 %  Body mass index is 34 54 kg/m²  Input and Output Summary (last 24 hours):     No intake or output data in the 24 hours ending 22 1053    Physical Exam:     Physical Exam  Vitals reviewed  Constitutional:       General: She is not in acute distress  Appearance: She is well-developed  HENT:      Head: Normocephalic and atraumatic  Eyes:      General: No scleral icterus  Right eye: No discharge  Left eye: No discharge  Cardiovascular:      Rate and Rhythm: Normal rate and regular rhythm  Pulses: Normal pulses  Heart sounds: Normal heart sounds  Pulmonary:      Effort: Pulmonary effort is normal  No respiratory distress  Breath sounds: Wheezing (B/L) present  No rales  Chest:      Chest wall: No tenderness  Abdominal:      General: Bowel sounds are normal  There is no distension  Palpations: Abdomen is soft  Tenderness: There is no abdominal tenderness  Musculoskeletal:         General: No swelling or tenderness  Normal range of motion  Cervical back: Normal range of motion  Right lower leg: No edema  Left lower leg: No edema  Skin:     General: Skin is warm  Coloration: Skin is not pale  Neurological:      General: No focal deficit present  Mental Status: She is alert and oriented to person, place, and time  Mental status is at baseline  Cranial Nerves: No cranial nerve deficit  Sensory: No sensory deficit  Motor: No weakness  Psychiatric:         Mood and Affect: Mood normal          Behavior: Behavior normal          Additional Data:     Labs:    Results from last 7 days   Lab Units 06/19/22  1017   WBC Thousand/uL 9 23   HEMOGLOBIN g/dL 14 0   HEMATOCRIT % 42 2   PLATELETS Thousands/uL 315   NEUTROS PCT % 59   LYMPHS PCT % 29   MONOS PCT % 11   EOS PCT % 1     Results from last 7 days   Lab Units 06/19/22  1048   POTASSIUM mmol/L 4 2   CHLORIDE mmol/L 102   CO2 mmol/L 26   BUN mg/dL 10   CREATININE mg/dL 0 71   CALCIUM mg/dL 9 1   ALK PHOS U/L 98   ALT U/L 29   AST U/L 19     Results from last 7 days   Lab Units 06/19/22  1017   INR  0 88       * I Have Reviewed All Lab Data Listed Above  * Additional Pertinent Lab Tests Reviewed: All Labs For Current Hospital Admission Reviewed    Imaging:  XR chest 1 view portable   Final Result by Yumi Chun MD (06/20 7660)      No acute cardiopulmonary disease                    Workstation performed: [id number]            Imaging Reports Reviewed Today Include:  CXR  Imaging Personally Reviewed by Myself Includes:  Same as above    Recent Cultures (last 7 days):           Last 24 Hours Medication List:   Current Facility-Administered Medications   Medication Dose Route Frequency Provider Last Rate    acetaminophen  650 mg Oral Q6H PRN Velvet Omer MD      aluminum-magnesium hydroxide-simethicone  30 mL Oral Q6H PRN Velvet Omer MD      atorvastatin  20 mg Oral Daily Velvet Omer MD      benzonatate  100 mg Oral TID PRN Montana Olson MD      clonazePAM  0 5 mg Oral BID PRN Velvet Omer, MD      dextromethorphan-guaiFENesin  10 mL Oral Q6H [address] Montana Olson MD      DULoxetine  30 mg Oral Daily Velvet Omer MD      enoxaparin  40 mg Subcutaneous Daily Velvet Omer MD      gabapentin  800 mg Oral TID Velvet Omer MD      hydrochlorothiazide  12 5 mg Oral Daily Montana Olson MD      insulin glargine  30 Units Subcutaneous HS Hansel Fontenot MD      insulin lispro  1-6 Units Subcutaneous TID AC Velvet Omer MD      insulin lispro  2-12 Units Subcutaneous HS Velvet Omer MD      ipratropium  0 5 mg Nebulization TID Velvet Omer MD      levalbuterol  1 25 mg Nebulization TID Velvet Omer MD      levalbuterol  1 25 mg Nebulization Q6H PRN Velvet Omer MD      And    sodium chloride  3 mL Nebulization Q6H PRN Velvet Omer MD      levothyroxine  25 mcg Oral Early Morning Velvet Omer MD      losartan  25 mg Oral Daily Montana Olson MD      methylPREDNISolone sodium succinate  40 mg Intravenous Q12H [address] Montana Olson MD      metoprolol tartrate  100 mg Oral Q12H [address] Velvet Omer MD      nicotine  1 patch Transdermal Daily Velvet Omer MD      pantoprazole  40 mg Oral Early Morning Velvet Omer MD      traZODone  200 mg Oral HS Velvet Omer MD          Today, Patient Was Seen By: Montana Olson MD    ** Please Note: This note has been constructed using a voice recognition system   **

## 2022-06-20 NOTE — ASSESSMENT & PLAN NOTE
Lab Results   Component Value Date    HGBA1C 8 6 (H) 07/01/2021   Hold metformin, continue Lantus, give sliding scale insulin    Continue Lantus 30 units HS with SSC

## 2022-06-20 NOTE — ASSESSMENT & PLAN NOTE
Presented with progressing SOB, wheezing suspected from viral bronchitis  No prior H/o COPD, asthma  Good baseline functional status given Pt can not ambulate up hill w/o any difficulty  -ve for COVID/RSV/influenza  CXR w/o any jeovanny consolidation  No URTI symptoms  Continues to have wheezing, SOB, generalized weakness today but has improved since yesterday  Decrease Solu-Medrol IV to b i d  Continue albuterol, Xopenex nebs  Consult Pulmonary  Respiratory protocol  Incentive spirometry  Robitussin q 6h  Tessalon Perles p r n    Will probably need PFTs on discharge  Smoking cessation was encouraged

## 2022-06-21 LAB
ANION GAP SERPL CALCULATED.3IONS-SCNC: 9 MMOL/L (ref 4–13)
BUN SERPL-MCNC: 15 MG/DL (ref 5–25)
CALCIUM SERPL-MCNC: 9.1 MG/DL (ref 8.4–10.2)
CHLORIDE SERPL-SCNC: 102 MMOL/L (ref 96–108)
CO2 SERPL-SCNC: 28 MMOL/L (ref 21–32)
CREAT SERPL-MCNC: 0.63 MG/DL (ref 0.6–1.3)
EST. AVERAGE GLUCOSE BLD GHB EST-MCNC: 148 MG/DL
GFR SERPL CREATININE-BSD FRML MDRD: 102 ML/MIN/1.73SQ M
GLUCOSE SERPL-MCNC: 209 MG/DL (ref 65–140)
GLUCOSE SERPL-MCNC: 231 MG/DL (ref 65–140)
GLUCOSE SERPL-MCNC: 255 MG/DL (ref 65–140)
GLUCOSE SERPL-MCNC: 284 MG/DL (ref 65–140)
GLUCOSE SERPL-MCNC: 326 MG/DL (ref 65–140)
HBA1C MFR BLD: 6.8 %
POTASSIUM SERPL-SCNC: 4.3 MMOL/L (ref 3.5–5.3)
SODIUM SERPL-SCNC: 139 MMOL/L (ref 135–147)

## 2022-06-21 PROCEDURE — 80048 BASIC METABOLIC PNL TOTAL CA: CPT | Performed by: INTERNAL MEDICINE

## 2022-06-21 PROCEDURE — 94640 AIRWAY INHALATION TREATMENT: CPT

## 2022-06-21 PROCEDURE — 82948 REAGENT STRIP/BLOOD GLUCOSE: CPT

## 2022-06-21 PROCEDURE — 99232 SBSQ HOSP IP/OBS MODERATE 35: CPT

## 2022-06-21 PROCEDURE — 94760 N-INVAS EAR/PLS OXIMETRY 1: CPT

## 2022-06-21 PROCEDURE — 99223 1ST HOSP IP/OBS HIGH 75: CPT | Performed by: INTERNAL MEDICINE

## 2022-06-21 PROCEDURE — 94664 DEMO&/EVAL PT USE INHALER: CPT

## 2022-06-21 RX ADMIN — METHYLPREDNISOLONE SODIUM SUCCINATE 40 MG: 40 INJECTION, POWDER, FOR SOLUTION INTRAMUSCULAR; INTRAVENOUS at 09:06

## 2022-06-21 RX ADMIN — GUAIFENESIN AND DEXTROMETHORPHAN 10 ML: 100; 10 SYRUP ORAL at 17:08

## 2022-06-21 RX ADMIN — DULOXETINE HYDROCHLORIDE 30 MG: 30 CAPSULE, DELAYED RELEASE ORAL at 09:04

## 2022-06-21 RX ADMIN — GUAIFENESIN AND DEXTROMETHORPHAN 10 ML: 100; 10 SYRUP ORAL at 00:00

## 2022-06-21 RX ADMIN — LEVALBUTEROL HYDROCHLORIDE 1.25 MG: 1.25 SOLUTION RESPIRATORY (INHALATION) at 13:49

## 2022-06-21 RX ADMIN — GABAPENTIN 800 MG: 400 CAPSULE ORAL at 09:05

## 2022-06-21 RX ADMIN — LEVOTHYROXINE SODIUM 25 MCG: 25 TABLET ORAL at 05:30

## 2022-06-21 RX ADMIN — ATORVASTATIN CALCIUM 20 MG: 20 TABLET, FILM COATED ORAL at 09:05

## 2022-06-21 RX ADMIN — INSULIN GLARGINE 30 UNITS: 100 INJECTION, SOLUTION SUBCUTANEOUS at 21:07

## 2022-06-21 RX ADMIN — INSULIN LISPRO 6 UNITS: 100 INJECTION, SOLUTION INTRAVENOUS; SUBCUTANEOUS at 21:07

## 2022-06-21 RX ADMIN — METOPROLOL TARTRATE 100 MG: 50 TABLET, FILM COATED ORAL at 21:07

## 2022-06-21 RX ADMIN — LEVALBUTEROL HYDROCHLORIDE 1.25 MG: 1.25 SOLUTION RESPIRATORY (INHALATION) at 07:42

## 2022-06-21 RX ADMIN — GABAPENTIN 800 MG: 400 CAPSULE ORAL at 21:06

## 2022-06-21 RX ADMIN — IPRATROPIUM BROMIDE 0.5 MG: 0.5 SOLUTION RESPIRATORY (INHALATION) at 19:52

## 2022-06-21 RX ADMIN — LOSARTAN POTASSIUM 25 MG: 25 TABLET, FILM COATED ORAL at 09:04

## 2022-06-21 RX ADMIN — IPRATROPIUM BROMIDE 0.5 MG: 0.5 SOLUTION RESPIRATORY (INHALATION) at 07:42

## 2022-06-21 RX ADMIN — GABAPENTIN 800 MG: 400 CAPSULE ORAL at 16:48

## 2022-06-21 RX ADMIN — ENOXAPARIN SODIUM 40 MG: 40 INJECTION SUBCUTANEOUS at 09:04

## 2022-06-21 RX ADMIN — METHYLPREDNISOLONE SODIUM SUCCINATE 40 MG: 40 INJECTION, POWDER, FOR SOLUTION INTRAMUSCULAR; INTRAVENOUS at 21:06

## 2022-06-21 RX ADMIN — INSULIN LISPRO 3 UNITS: 100 INJECTION, SOLUTION INTRAVENOUS; SUBCUTANEOUS at 09:05

## 2022-06-21 RX ADMIN — IPRATROPIUM BROMIDE 0.5 MG: 0.5 SOLUTION RESPIRATORY (INHALATION) at 13:49

## 2022-06-21 RX ADMIN — INSULIN LISPRO 2 UNITS: 100 INJECTION, SOLUTION INTRAVENOUS; SUBCUTANEOUS at 12:16

## 2022-06-21 RX ADMIN — LEVALBUTEROL HYDROCHLORIDE 1.25 MG: 1.25 SOLUTION RESPIRATORY (INHALATION) at 19:51

## 2022-06-21 RX ADMIN — GUAIFENESIN AND DEXTROMETHORPHAN 10 ML: 100; 10 SYRUP ORAL at 05:30

## 2022-06-21 RX ADMIN — METOPROLOL TARTRATE 100 MG: 50 TABLET, FILM COATED ORAL at 09:05

## 2022-06-21 RX ADMIN — TRAZODONE HYDROCHLORIDE 200 MG: 100 TABLET ORAL at 21:06

## 2022-06-21 RX ADMIN — INSULIN LISPRO 5 UNITS: 100 INJECTION, SOLUTION INTRAVENOUS; SUBCUTANEOUS at 16:49

## 2022-06-21 RX ADMIN — GUAIFENESIN AND DEXTROMETHORPHAN 10 ML: 100; 10 SYRUP ORAL at 11:31

## 2022-06-21 RX ADMIN — HYDROCHLOROTHIAZIDE 12.5 MG: 12.5 TABLET ORAL at 09:04

## 2022-06-21 RX ADMIN — PANTOPRAZOLE SODIUM 40 MG: 40 TABLET, DELAYED RELEASE ORAL at 05:30

## 2022-06-21 NOTE — ASSESSMENT & PLAN NOTE
Lab Results   Component Value Date    HGBA1C 8 6 (H) 07/01/2021     · Home Medications:  · Metformin, and Lantus 30 units Daily  · Hold metformin while admitted  · Continue sliding scale insulin    · Patient with hyperglycemia-> Likely steroid induced  · Will add Humalog with meals   · Continue Diabetic Diet

## 2022-06-21 NOTE — ASSESSMENT & PLAN NOTE
· BP reviewed and mildly elevated  · Continue HCTZ low-dose, and losartan  · DC'd lisinopril  · Continue to monitor BP

## 2022-06-21 NOTE — ASSESSMENT & PLAN NOTE
· Smokes 1 pack of cigarettes a day-> patient is willing to quit smoking     · Educated on the importance of smoking cessation  · C/w Nicotine patch

## 2022-06-21 NOTE — ASSESSMENT & PLAN NOTE
· H/o chronic dry cough for > 2 years after starting lisinopril  · Will hold lisinopril for now  · C/w losartan low-dose      · Continue outpatient follow up with PCP for further medication adjustments

## 2022-06-21 NOTE — CASE MANAGEMENT
Case Management Discharge Planning Note    Patient name Yumi Canales  Location [address] [id number] MRN [id number]  : 1965 Date 2022       Current Admission Date: 2022  Current Admission Diagnosis:Bronchitis with acute wheezing   Patient Active Problem List    Diagnosis Date Noted    Chronic cough 2022    Bronchitis with acute wheezing 2022    Cigarette smoker 2022    Tachypnea 2022    Hypertensive urgency 2022    Tachycardia 2022    Medicare annual wellness visit, initial 2021    Diabetes mellitus type 2 in obese ([facility] ) 2021    Primary hypertension 2021    Hypothyroidism 2021    Vitamin D deficiency 2021    Tiredness 2021    Obesity, morbid ([place] [address] ) 2021    Chest pressure 2021    Increased anion gap metabolic acidosis     STI (sexually transmitted infection) 2021    Gastroesophageal reflux disease without esophagitis 2020    Toxic encephalopathy 2020    Bipolar I disorder with anxious distress ([place] [address] ) 2020    Benzodiazepine dependence, continuous ([place] [address] ) 2020    Non compliance w medication regimen 2020    Acquired hypothyroidism 2020    Hypokalemia 2020    Hyperlipemia 2020    Transaminitis 2020    Cognitive dysfunction in bipolar disorder ([place] [address] ) 2020    Tobacco abuse 2020    Bipolar disorder current episode depressed ([place] [address] ) 2020    Post-traumatic stress disorder, chronic 2019    Alcohol use disorder, moderate, dependence ([place] [address] ) 2019    Essential hypertension 2018    Generalized anxiety disorder 2017      LOS (days): 1  Geometric Mean LOS (GMLOS) (days): 3 60  Days to GMLOS:2 6     OBJECTIVE:  Risk of Unplanned Readmission Score: 25 37         Current admission status: Inpatient   Preferred Pharmacy:   [facility] [address] [facility], [address],  [address] [place] [address]  Phone: [phone] Fax: [phone]    Primary Care Provider: Yumiko Robertson MD    Primary Insurance: [facility]  Secondary Insurance:     DISCHARGE DETAILS:    Discharge planning discussed with[de-identified] patient  Freedom of Choice: Yes  Comments - Freedom of Choice: CM met with patient at bedside  IMM reviewed, copy provided                                                                               IMM Given (Date):: 06/21/22  IMM Given to[de-identified] Patient

## 2022-06-21 NOTE — ASSESSMENT & PLAN NOTE
· Home Medications:  · Abilify, Wellbutrin, Cymbalta, Atarax, and Trazadone  · Patient states she only takes gabapentin 800 mg 3 times a day and Klonopin 0 5 mg b i d  As needed for anxiety  · Patient states she stopped all other medications

## 2022-06-21 NOTE — DISCHARGE SUMMARY
[address]  68   Discharge- [facility] 1965, 49 y o  female MRN: [id number]  Unit/Bed#: -01 Encounter: [id number]  Primary Care Provider: Pauline Lam MD   Date and time admitted to hospital: 6/19/2022 10:06 AM    * Bronchitis with acute wheezing  Assessment & Plan  · Presented with progressing SOB, wheezing suspected from viral bronchitis  · No Hx of COPD, asthma, although reports chornic cough and smoking history  · Good baseline functional status given pt can not ambulate up hill w/o any difficulty  · COVID/RSV/influenza: Negative  · CXR w/o any jeovanny consolidation  No URTI symptoms  · Patient's SOB and generalized weakness improved, wheezing resolved, still with chronic non-productive cough  · Mild leukocytosis today, likely steroid induced as VSS and procalcitonin normal   · Day 3 IV SoluMedrol, will discharge with 7 day oral prednisone taper  · Pulmonology consulted:  · Presentation likely viral in nature  Suggest ENT consultation on discharge  · Follow-up with pulmonology in 2 weeks, will discharge with [address]  · Respiratory protocol  · Incentive spirometry  · Continue albuterol, Xopenex nebs, Tessalon Perles p r n  · Smoking cessation was encouraged    Chronic cough  Assessment & Plan  · H/o chronic dry cough for > 2 years after starting lisinopril  D/C lisinopril  · C/w losartan low-dose, outpatient follow up with PCP for further medication adjustments    Cigarette smoker  Assessment & Plan  · Smokes 1 pack of cigarettes a day-> patient is willing to quit smoking  · Educated on the importance of smoking cessation  · C/w Nicotine patch     Diabetes mellitus type 2 in obese [facility])  Assessment & Plan    Lab Results   Component Value Date    HGBA1C 6 8 (H) 06/19/2022     · Home Medications: Metformin, and Lantus 30 units Daily  · Continue sliding scale insulin coverage, home Lantus  · Patient with hyperglycemia, BG 200s -> Likely steroid induced   Will likely improve with tapering off steroids on discharge, can resume previous home meds on discharge  · Continue Diabetic Diet    Gastroesophageal reflux disease without esophagitis  Assessment & Plan  · Currently not on any medication, given her wheezing will start her on pantoprazole  · Will discharge with Protonix 40mg daily for PPI trial, will give referral to follow-up with ENT    Hyperlipemia  Assessment & Plan  · Continue Home medication: Atorvastatin    Acquired hypothyroidism  Assessment & Plan  · Continue Home medication: levothyroxine  Generalized anxiety disorder  Assessment & Plan  · Home Medications: Abilify, Wellbutrin, Cymbalta, Atarax, and Trazadone  · Patient states she only takes gabapentin 800 mg 3 times a day and Klonopin 0 5 mg b i d  As needed for anxiety  · Patient states she stopped all other medications  Essential hypertension  Assessment & Plan  · BP reviewed and mildly elevated  · Continue HCTZ low-dose, and losartan  · DC'd lisinopril  · Continue to monitor BP      Medical Problems             Resolved Problems  Date Reviewed: 6/22/2022   None               Discharging Physician / Practitioner: Trenton Ruiz PA-C  PCP: Latoya Edwards MD  Admission Date:   Admission Orders (From admission, onward)     Ordered        06/20/22 1053  Inpatient Admission  Once            06/19/22 1309  Place in Observation  Once                      Discharge Date: 06/22/22    Consultations During Hospital Stay:  · Pulmonology    Procedures Performed:   · None    Significant Findings / Test Results:     XR chest 1 view portable   Final Result by Marino Treviño MD (06/20 4725)      No acute cardiopulmonary disease  Workstation performed: [id number]             Incidental Findings:   · None    Test Results Pending at Discharge (will require follow up):    · None     Outpatient Tests Requested:  · Recommend outpatient follow-up with PCP  · Recommend outpatient follow-up with Endocrinology  · Recommend outpatient follow-up with pulmonology    Complications:  None    Reason for Admission:  Bronchitis with acute wheezing    Hospital Course:   Matilde Daniels is a 49 y o  female patient with PMH anxiety, depression, GERD, dm 2, and current smoker who originally presented to the hospital on 6/19/2022 due to worsening shortness of breath, cough, congestion, and wheezing that worsened the last 3 days prior to admission  Patient stated she was unable to sleep due to wheezing, shortness of breath and had to come to the hospital  Patient states she has a persistent and nonproductive cough  Upon arrival to the ED, patient was worked up for suspected acute viral bronchitis  Patient has no history of asthma or COPD  Chest x-ray was negative for any consolidations  COVID/RSV/influenza swab was negative  Patient was treated with IV steroids and nebulizer treatments with overall improvement in respiratory status  Patient will be discharged home with p o  prednisone taper  Patient with persistent wheezing during hospitalization, although did improve  Patient will benefit from continue outpatient follow-up with pulmonology upon discharge as well as ENT evaluation  Recommend patient undergo PFT testing within 4-6 weeks of hospitalization  Of note patient with notable hyperglycemia while admitted  Patient was supposed to follow with endocrinology at the beginning of June but missed appointment  Patient's home insulin regimen was adjusted with improvement in overall glucose control  Recommend close outpatient follow-up with PCP and endocrinology for further diabetic management  Patient medically stable for discharge  Discussed current plan with patient, and all questions were answered  Please see above list of diagnoses and related plan for additional information       Condition at Discharge: stable    Discharge Day Visit / Exam:   Subjective:  Patient is seen at bedside kristina jennings , reports that wheezing/SOB is improved today, cough still present but overall unchanged  Patient states she feels ready for discharge home  Vitals: Blood Pressure: 153/88 (06/22/22 0651)  Pulse: 76 (06/22/22 0842)  Temperature: 97 7 °F (36 5 °C) (06/22/22 0651)  Temp Source: Tympanic (06/22/22 0651)  Respirations: 17 (06/22/22 0651)  Height: 5' 3" (160 cm) (06/19/22 1010)  Weight - Scale: 88 5 kg (195 lb) (06/19/22 1010)  SpO2: 95 % (06/22/22 0736)  Exam:   Physical Exam  Constitutional:       General: She is not in acute distress  Appearance: She is not ill-appearing, toxic-appearing or diaphoretic  Cardiovascular:      Rate and Rhythm: Normal rate and regular rhythm  Pulses: Normal pulses  Heart sounds: Normal heart sounds  Pulmonary:      Effort: Pulmonary effort is normal  No respiratory distress  Comments: No wheezing appreciated  Abdominal:      General: Bowel sounds are normal  There is no distension  Palpations: Abdomen is soft  Tenderness: There is no abdominal tenderness  Musculoskeletal:         General: No swelling or tenderness  Skin:     General: Skin is warm  Neurological:      General: No focal deficit present  Mental Status: She is alert  Psychiatric:         Mood and Affect: Mood normal          Behavior: Behavior normal          Discussion with Family: Patient declined call to   Discharge instructions/Information to patient and family:   See after visit summary for information provided to patient and family  Provisions for Follow-Up Care:  See after visit summary for information related to follow-up care and any pertinent home health orders  Disposition:   Home    Planned Readmission:  No     Discharge Statement:  I spent 60 minutes discharging the patient  This time was spent on the day of discharge  I had direct contact with the patient on the day of discharge   Greater than 50% of the total time was spent examining patient, answering all patient questions, arranging and discussing plan of care with patient as well as directly providing post-discharge instructions  Additional time then spent on discharge activities  Discharge Medications:  See after visit summary for reconciled discharge medications provided to patient and/or family        **Please Note: This note may have been constructed using a voice recognition system**

## 2022-06-21 NOTE — CONSULTS
Consultation - Pulmonary Medicine   Annabella Erickson 49 y o  female MRN: [id number]  Unit/Bed#: -01 Encounter: [id number]      Assessment and Plan:    1  Acute tracheobronchitis:  She most likely has acute laryngotracheobronchitis, likely viral   She continues to have upper airway sounds and wheezing  She is already on IV Solu-Medrol and bronchodilators  Suggest ENT consultation  2  COPD emphysema:  She has been a smoker for more than 20 years at least 1 pack per day  Her CT scan shows evidence of structural emphysema  She has not been on any inhaler or oxygen  Continue on IV Solu-Medrol and nebulized ipratropium and levalbuterol  She needs a full PFT as an outpatient  She would need to be discharged on Breo 100 or equivalent inhaler  3  Obstructive sleep apnea:  She has history of snoring daytime sleepiness and tiredness  She is obese  She has oropharyngeal crowding  She likely has significant sleep apnea  She needs a sleep study as an outpatient  4  Obesity:  She is obese and understands the need for weight reduction  She understands that weight reduction can improve her symptoms  5  Tobacco dependence:  She has been a smoker for more than 20 years, smoking at least 1 pack per day  Cough and shortness of breath and wheezing could be partly from COPD  I have counseled her to quit smoking completely  I also spoke to her  who is a smoker  She does not want to take nicotine patch  I had a long discussion with her and her  and answered all their questions  I have advised her to follow up in the pulmonary office after discharge and I have given her a card to call for follow-up appointment  Thank you for allowing me to participate in the care of the patient           History of Present Illness      Physician Requesting Consult: Shruthi Gann MD     Reason for Consult / Principal Problem:  Acute bronchitis    Hx and PE limited by:None    HPI: Annabella Erickson is a 49 y o  year old female with past medical history of diabetes hypertension obesity anxiety disorder who presents with worsening shortness of breath and cough and wheeze since 5 days  She admits that she has had cough for some time  The cough is mostly nonproductive with occasional clear phlegm  She has noted wheezing and hoarseness of voice  She denied any fever or chills  She denied any dysphagia  She is not on any oxygen or inhaler at home  She has been a smoker for more than 20 years more than a pack a day  No history of occupational exposure to chemicals or asbestos  She and her  both smoke  She has not had any previous PFT  Her CT angiogram on admission was negative for pulmonary embolism  There were no obvious infiltrates  She had mild emphysematous changes in the upper lobes  Her exercise tolerance is limited also because of knee problems  She has history of heavy snoring and daytime sleepiness and tiredness  She sometimes wakes up during sleep feeling like choking  No witnessed apneas  She has not been sleep tested before  She has hypertension diabetes as comorbidities  Consults    Review of Systems   Constitutional: Positive for fatigue  Negative for appetite change, chills and fever  HENT: Positive for voice change  Negative for hearing loss, rhinorrhea, sneezing, sore throat and trouble swallowing  Eyes: Negative for visual disturbance  Respiratory: Positive for cough, shortness of breath and wheezing  Negative for choking, chest tightness and stridor  Cardiovascular: Negative for chest pain, palpitations and leg swelling  Gastrointestinal: Negative for abdominal pain, constipation, diarrhea, nausea and vomiting  History of gastroesophageal reflux disease   Genitourinary: Positive for urgency  Negative for dysuria and frequency  Musculoskeletal: Positive for arthralgias  Negative for gait problem and joint swelling  Skin: Negative for rash  Allergic/Immunologic: Negative for environmental allergies  Neurological: Positive for headaches  Negative for dizziness, syncope and light-headedness  Psychiatric/Behavioral: Positive for sleep disturbance  Negative for agitation  The patient is nervous/anxious  Historical Information   Past Medical History:   Diagnosis Date    Addiction to drug ([place] [address] )     Adjustment disorder     Alcohol abuse     Alcoholism ([place] [address] )     Anxiety     Bipolar disorder (HCC)     Bowel obstruction ([place] [address] )     Depression     Diabetes type 2, controlled ([place] [address] )     Disease of thyroid gland     Gastritis     Head injury     Heart palpitations     Hyperlipidemia     Hypertension     Hypothyroidism     Psychiatric illness     PTSD (post-traumatic stress disorder)     Seizure ([place] [address] )     Seizures ([place] [address] )     Sleep difficulties     Substance abuse ([place] [address] )     Suicide attempt ([place] [address] )     Withdrawal symptoms, drug or narcotic ([place] [address] )      Past Surgical History:   Procedure Laterality Date    ABDOMINAL SURGERY      APPENDECTOMY      BOWEL RESECTION      CHOLECYSTECTOMY      ESOPHAGOGASTRODUODENOSCOPY N/A 5/18/2018    Procedure: ESOPHAGOGASTRODUODENOSCOPY (EGD) with bx;  Surgeon: Semaj Burciaga DO;  Location: [facility];   Service: Gastroenterology    HYSTERECTOMY      KNEE SURGERY Bilateral      Social History   Social History     Substance and Sexual Activity   Alcohol Use Not Currently    Alcohol/week: 6 0 standard drinks    Types: 6 Cans of beer per week    Comment: last drink on 08/15/20     Social History     Substance and Sexual Activity   Drug Use No     E-Cigarette/Vaping    E-Cigarette Use Never User      E-Cigarette/Vaping Substances    Nicotine Yes     THC No     CBD No     Flavoring No     Other No     Unknown No      Social History     Tobacco Use   Smoking Status Current Every Day Smoker    Packs/day: 0 50    Years: 25 00    Pack years: 12 50    Types: Cigarettes   Smokeless Tobacco Never Used Occupational History:  Noncontributory    Family History: non-contributory    Meds/Allergies   all current active meds have been reviewed    Allergies   Allergen Reactions    Latex Rash       Objective   Vitals: Blood pressure 158/84, pulse 62, temperature (!) 96 7 °F (35 9 °C), temperature source Tympanic, resp  rate 17, height 5' 3" (1 6 m), weight 88 5 kg (195 lb), SpO2 99 %, not currently breastfeeding  ,Body mass index is 34 54 kg/m²  Intake/Output Summary (Last 24 hours) at 6/21/2022 1335  Last data filed at 6/21/2022 0725  Gross per 24 hour   Intake 240 ml   Output --   Net 240 ml     Invasive Devices  Report    Peripheral Intravenous Line  Duration           Peripheral IV 03/28/22 Left Antecubital 84 days    Peripheral IV 06/19/22 Left Antecubital 2 days                Physical Exam  Vitals reviewed  Constitutional:       General: She is not in acute distress  Appearance: She is obese  She is not ill-appearing, toxic-appearing or diaphoretic  HENT:      Head: Normocephalic  Mouth/Throat:      Mouth: Mucous membranes are moist    Eyes:      General: No scleral icterus  Conjunctiva/sclera: Conjunctivae normal    Cardiovascular:      Rate and Rhythm: Normal rate and regular rhythm  Heart sounds: Normal heart sounds  No murmur heard  Pulmonary:      Effort: Pulmonary effort is normal  No respiratory distress  Breath sounds: No stridor  Rhonchi (Bilateral rhonchi, predominantly expiratory) present  No wheezing or rales  Chest:      Chest wall: No tenderness  Abdominal:      General: Bowel sounds are normal       Palpations: Abdomen is soft  Tenderness: There is no abdominal tenderness  There is no guarding  Musculoskeletal:      Cervical back: No rigidity  Right lower leg: No edema  Left lower leg: No edema  Lymphadenopathy:      Cervical: No cervical adenopathy  Skin:     Coloration: Skin is not jaundiced or pale     Neurological:      Mental Status: She is alert and oriented to person, place, and time  Gait: Gait normal    Psychiatric:         Mood and Affect: Mood normal          Behavior: Behavior normal          Thought Content: Thought content normal          Judgment: Judgment normal          Lab Results: I have personally reviewed pertinent lab results  The leukocytosis has resolved  No significant eosinophilia bicarbonate 28  Imaging Studies: I have personally reviewed pertinent films in PACS  The CT angiogram negative for pulmonary embolism  Centrilobular emphysema  No significant infiltrates  EKG, Pathology, and Other Studies: I have personally reviewed pertinent reports         VTE Prophylaxis: Enoxaparin (Lovenox)    Code Status: Level 1 - Full Code  Advance Directive and Living Will:      Power of :    POLST:      None

## 2022-06-21 NOTE — PROGRESS NOTES
[facility]  Progress Note Jeanne Art 1965, 49 y o  female MRN: [id number]  Unit/Bed#: -01 Encounter: [id number]  Primary Care Provider: Juju Sebastian MD   Date and time admitted to hospital: 6/19/2022 10:06 AM    * Bronchitis with acute wheezing  Assessment & Plan  Presented with progressing SOB, wheezing suspected from viral bronchitis  · No Hx of COPD, asthma  · Good baseline functional status given Pt can not ambulate up hill w/o any difficulty  · COVID/RSV/influenza: Negative  · CXR w/o any jeovanny consolidation  · No URTI symptoms  · Patient continues to have wheezing, SOB, generalized weakness today but has improved since yesterday  · C/w Solu-Medrol IV to b i d  (D2)  · Respiratory protocol  · Continue albuterol, Xopenex nebs  · Pulmonary consulted  · Appreciate recommendations  · Incentive spirometry  · Tessalon Perles p r n  · Will probably need PFTs on discharge  · Smoking cessation was encouraged    Diabetes mellitus type 2 in [facility])  Assessment & Plan    Lab Results   Component Value Date    HGBA1C 8 6 (H) 07/01/2021     · Home Medications:  · Metformin, and Lantus 30 units Daily  · Hold metformin while admitted  · Continue sliding scale insulin  · Patient with hyperglycemia-> Likely steroid induced  · Will add Humalog with meals   · Continue Diabetic Diet    Essential hypertension  Assessment & Plan  · BP reviewed and mildly elevated  · Continue HCTZ low-dose, and losartan  · DC'd lisinopril  · Continue to monitor BP    Generalized anxiety disorder  Assessment & Plan  · Home Medications:  · Abilify, Wellbutrin, Cymbalta, Atarax, and Trazadone  · Patient states she only takes gabapentin 800 mg 3 times a day and Klonopin 0 5 mg b i d  As needed for anxiety  · Patient states she stopped all other medications  Cigarette smoker  Assessment & Plan  · Smokes 1 pack of cigarettes a day-> patient is willing to quit smoking     · Educated on the importance of smoking cessation  · C/w Nicotine patch     Chronic cough  Assessment & Plan  · H/o chronic dry cough for > 2 years after starting lisinopril  · Will hold lisinopril for now  · C/w losartan low-dose  · Continue outpatient follow up with PCP for further medication adjustments    Hyperlipemia  Assessment & Plan  · Continue Home medication:  · Atorvastatin    Acquired hypothyroidism  Assessment & Plan  · Continue Home medication:  · levothyroxine  VTE Pharmacologic Prophylaxis: VTE Score: 3 Moderate Risk (Score 3-4) - Pharmacological DVT Prophylaxis Ordered: enoxaparin (Lovenox)  Patient Centered Rounds: I performed bedside rounds with nursing staff today  Discussions with Specialists or Other Care Team Provider: Pulmonary    Education and Discussions with Family / Patient: Patient declined call to   Time Spent for Care: 30 minutes  More than 50% of total time spent on counseling and coordination of care as described above  Current Length of Stay: 1 day(s)  Current Patient Status: Inpatient   Certification Statement: The patient will continue to require additional inpatient hospital stay due to persistent wheezing, requring IV steroids  Discharge Plan: Anticipate discharge tomorrow to home  Code Status: Level 1 - Full Code    Subjective:   Patient denies any acute complaints  Patient states she has never had any issues with her breathing in the past  Patient denies any chest pain, abdominal pain, nausea, vomiting, or diarrhea  Objective:     Vitals:   Temp (24hrs), Av 7 °F (36 5 °C), Min:96 7 °F (35 9 °C), Max:98 3 °F (36 8 °C)    Temp:  [96 7 °F (35 9 °C)-98 3 °F (36 8 °C)] 96 7 °F (35 9 °C)  HR:  [62-77] 62  Resp:  [16-17] 17  BP: (114-158)/(61-84) 158/84  SpO2:  [94 %-99 %] 99 %  Body mass index is 34 54 kg/m²  Input and Output Summary (last 24 hours):      Intake/Output Summary (Last 24 hours) at 2022 1139  Last data filed at 2022 0725  Gross per 24 hour Intake 240 ml   Output --   Net 240 ml       Physical Exam:   Physical Exam  Vitals and nursing note reviewed  Constitutional:       General: She is not in acute distress  Appearance: She is obese  She is not ill-appearing  HENT:      Head: Normocephalic  Nose: Nose normal  No congestion  Mouth/Throat:      Mouth: Mucous membranes are moist       Pharynx: Oropharynx is clear  Cardiovascular:      Rate and Rhythm: Normal rate and regular rhythm  Pulses: Normal pulses  Heart sounds: No murmur heard  Pulmonary:      Effort: Pulmonary effort is normal  No respiratory distress  Breath sounds: Wheezing (Fine Expiratory) present  Abdominal:      General: Bowel sounds are normal  There is no distension  Palpations: Abdomen is soft  Tenderness: There is no abdominal tenderness  Musculoskeletal:         General: Normal range of motion  Cervical back: Normal range of motion  Right lower leg: No edema  Left lower leg: No edema  Skin:     General: Skin is warm and dry  Capillary Refill: Capillary refill takes less than 2 seconds  Neurological:      Mental Status: She is alert and oriented to person, place, and time  Mental status is at baseline  Motor: No weakness  Psychiatric:         Mood and Affect: Mood normal          Speech: Speech normal          Behavior: Behavior is cooperative           Judgment: Judgment normal           Additional Data:     Labs:  Results from last 7 days   Lab Units 06/19/22  1017   WBC Thousand/uL 9 23   HEMOGLOBIN g/dL 14 0   HEMATOCRIT % 42 2   PLATELETS Thousands/uL 315   NEUTROS PCT % 59   LYMPHS PCT % 29   MONOS PCT % 11   EOS PCT % 1     Results from last 7 days   Lab Units 06/21/22  0539 06/19/22  1048   SODIUM mmol/L 139 138   POTASSIUM mmol/L 4 3 4 2   CHLORIDE mmol/L 102 102   CO2 mmol/L 28 26   BUN mg/dL 15 10   CREATININE mg/dL 0 63 0 71   ANION GAP mmol/L 9 10   CALCIUM mg/dL 9 1 9 1   ALBUMIN g/dL  -- 4  2   TOTAL BILIRUBIN mg/dL  --  0 54   ALK PHOS U/L  --  98   ALT U/L  --  29   AST U/L  --  19   GLUCOSE RANDOM mg/dL 231* 140     Results from last 7 days   Lab Units 06/19/22  1017   INR  0 88     Results from last 7 days   Lab Units 06/21/22  1107 06/21/22  0732 06/20/22  2031 06/20/22  1519 06/20/22  1049 06/20/22  0712 06/19/22  2036 06/19/22  1554   POC GLUCOSE mg/dl 209* 255* 201* 274* 313* 284* 277* 261*               Lines/Drains:  Invasive Devices  Report    Peripheral Intravenous Line  Duration           Peripheral IV 03/28/22 Left Antecubital 84 days    Peripheral IV 06/19/22 Left Antecubital 2 days                      Imaging: No pertinent imaging reviewed      Recent Cultures (last 7 days):         Last 24 Hours Medication List:   Current Facility-Administered Medications   Medication Dose Route Frequency Provider Last Rate    acetaminophen  650 mg Oral Q6H PRN Korey Hu MD      aluminum-magnesium hydroxide-simethicone  30 mL Oral Q6H PRN Korey Hu MD      atorvastatin  20 mg Oral Daily Korey Hu MD      benzonatate  100 mg Oral TID PRN Dagmar Sotelo MD      clonazePAM  0 5 mg Oral BID PRN Korey Hu MD      dextromethorphan-guaiFENesin  10 mL Oral Q6H [address] Dagmar Sotelo MD      DULoxetine  30 mg Oral Daily Korey Hu MD      enoxaparin  40 mg Subcutaneous Daily Korey Hu MD      gabapentin  800 mg Oral TID Korey Hu MD      hydrochlorothiazide  12 5 mg Oral Daily Dagmar Sotelo MD      insulin glargine  30 Units Subcutaneous HS Hansel Johnson MD      insulin lispro  1-6 Units Subcutaneous TID AC Korey Hu MD      insulin lispro  2-12 Units Subcutaneous HS Korey Hu MD      ipratropium  0 5 mg Nebulization TID Korey Hu MD      levalbuterol  1 25 mg Nebulization TID Korey Hu MD      levalbuterol  1 25 mg Nebulization Q6H PRN Korey Hu MD And    sodium chloride  3 mL Nebulization Q6H PRN Dillon Ramos MD      levothyroxine  25 mcg Oral Early Morning Dillon Ramos MD      losartan  25 mg Oral Daily Ruiz Woods MD      methylPREDNISolone sodium succinate  40 mg Intravenous Q12H [address] Ruiz Woods MD      metoprolol tartrate  100 mg Oral Q12H [address] Dillon Ramos MD      nicotine  1 patch Transdermal Daily Dillon Ramos MD      pantoprazole  40 mg Oral Early Morning Dillon Ramos MD      traZODone  200 mg Oral HS Dillon Ramos MD          Today, Patient Was Seen By: YONG Knowles    **Please Note: This note may have been constructed using a voice recognition system  **

## 2022-06-22 VITALS
WEIGHT: 195 LBS | DIASTOLIC BLOOD PRESSURE: 88 MMHG | OXYGEN SATURATION: 95 % | HEIGHT: 63 IN | BODY MASS INDEX: 34.55 KG/M2 | TEMPERATURE: 97.7 F | HEART RATE: 76 BPM | RESPIRATION RATE: 17 BRPM | SYSTOLIC BLOOD PRESSURE: 153 MMHG

## 2022-06-22 LAB
ANION GAP SERPL CALCULATED.3IONS-SCNC: 9 MMOL/L (ref 4–13)
BASOPHILS # BLD AUTO: 0.02 THOUSANDS/ΜL (ref 0–0.1)
BASOPHILS NFR BLD AUTO: 0 % (ref 0–1)
BUN SERPL-MCNC: 16 MG/DL (ref 5–25)
CALCIUM SERPL-MCNC: 9.6 MG/DL (ref 8.4–10.2)
CHLORIDE SERPL-SCNC: 101 MMOL/L (ref 96–108)
CO2 SERPL-SCNC: 29 MMOL/L (ref 21–32)
CREAT SERPL-MCNC: 0.59 MG/DL (ref 0.6–1.3)
EOSINOPHIL # BLD AUTO: 0 THOUSAND/ΜL (ref 0–0.61)
EOSINOPHIL NFR BLD AUTO: 0 % (ref 0–6)
ERYTHROCYTE [DISTWIDTH] IN BLOOD BY AUTOMATED COUNT: 13.4 % (ref 11.6–15.1)
GFR SERPL CREATININE-BSD FRML MDRD: 104 ML/MIN/1.73SQ M
GLUCOSE SERPL-MCNC: 243 MG/DL (ref 65–140)
GLUCOSE SERPL-MCNC: 279 MG/DL (ref 65–140)
GLUCOSE SERPL-MCNC: 282 MG/DL (ref 65–140)
HCT VFR BLD AUTO: 39.6 % (ref 34.8–46.1)
HGB BLD-MCNC: 13.2 G/DL (ref 11.5–15.4)
IMM GRANULOCYTES # BLD AUTO: 0.08 THOUSAND/UL (ref 0–0.2)
IMM GRANULOCYTES NFR BLD AUTO: 1 % (ref 0–2)
LYMPHOCYTES # BLD AUTO: 2.38 THOUSANDS/ΜL (ref 0.6–4.47)
LYMPHOCYTES NFR BLD AUTO: 18 % (ref 14–44)
MCH RBC QN AUTO: 29.7 PG (ref 26.8–34.3)
MCHC RBC AUTO-ENTMCNC: 33.3 G/DL (ref 31.4–37.4)
MCV RBC AUTO: 89 FL (ref 82–98)
MONOCYTES # BLD AUTO: 0.56 THOUSAND/ΜL (ref 0.17–1.22)
MONOCYTES NFR BLD AUTO: 4 % (ref 4–12)
NEUTROPHILS # BLD AUTO: 10.34 THOUSANDS/ΜL (ref 1.85–7.62)
NEUTS SEG NFR BLD AUTO: 77 % (ref 43–75)
NRBC BLD AUTO-RTO: 0 /100 WBCS
PLATELET # BLD AUTO: 345 THOUSANDS/UL (ref 149–390)
PMV BLD AUTO: 10.6 FL (ref 8.9–12.7)
POTASSIUM SERPL-SCNC: 3.9 MMOL/L (ref 3.5–5.3)
PROCALCITONIN SERPL-MCNC: <0.05 NG/ML
RBC # BLD AUTO: 4.44 MILLION/UL (ref 3.81–5.12)
SODIUM SERPL-SCNC: 139 MMOL/L (ref 135–147)
WBC # BLD AUTO: 13.38 THOUSAND/UL (ref 4.31–10.16)

## 2022-06-22 PROCEDURE — 80048 BASIC METABOLIC PNL TOTAL CA: CPT

## 2022-06-22 PROCEDURE — 84145 PROCALCITONIN (PCT): CPT | Performed by: PHYSICIAN ASSISTANT

## 2022-06-22 PROCEDURE — 85025 COMPLETE CBC W/AUTO DIFF WBC: CPT

## 2022-06-22 PROCEDURE — 99239 HOSP IP/OBS DSCHRG MGMT >30: CPT | Performed by: PHYSICIAN ASSISTANT

## 2022-06-22 PROCEDURE — 82948 REAGENT STRIP/BLOOD GLUCOSE: CPT

## 2022-06-22 PROCEDURE — 94760 N-INVAS EAR/PLS OXIMETRY 1: CPT

## 2022-06-22 PROCEDURE — 94640 AIRWAY INHALATION TREATMENT: CPT

## 2022-06-22 RX ORDER — FLUTICASONE FUROATE AND VILANTEROL TRIFENATATE 100; 25 UG/1; UG/1
1 POWDER RESPIRATORY (INHALATION) DAILY
Qty: 60 BLISTER | Refills: 0 | Status: SHIPPED | OUTPATIENT
Start: 2022-06-22 | End: 2022-07-22

## 2022-06-22 RX ORDER — INSULIN GLARGINE 100 [IU]/ML
30 INJECTION, SOLUTION SUBCUTANEOUS
Qty: 10 ML | Refills: 0 | Status: SHIPPED | OUTPATIENT
Start: 2022-06-22

## 2022-06-22 RX ORDER — PREDNISONE 20 MG/1
TABLET ORAL
Qty: 10 TABLET | Refills: 0 | Status: SHIPPED | OUTPATIENT
Start: 2022-06-22 | End: 2022-06-29

## 2022-06-22 RX ORDER — BLOOD-GLUCOSE METER
KIT MISCELLANEOUS
Qty: 1 KIT | Refills: 0 | Status: SHIPPED | OUTPATIENT
Start: 2022-06-22

## 2022-06-22 RX ORDER — LANCETS 33 GAUGE
EACH MISCELLANEOUS
Qty: 100 EACH | Refills: 0 | Status: SHIPPED | OUTPATIENT
Start: 2022-06-22

## 2022-06-22 RX ORDER — GLUCOSAMINE HCL/CHONDROITIN SU 500-400 MG
CAPSULE ORAL
Qty: 100 EACH | Refills: 0 | Status: SHIPPED | OUTPATIENT
Start: 2022-06-22

## 2022-06-22 RX ORDER — LOSARTAN POTASSIUM 25 MG/1
25 TABLET ORAL DAILY
Qty: 30 TABLET | Refills: 0 | Status: SHIPPED | OUTPATIENT
Start: 2022-06-22 | End: 2022-06-24

## 2022-06-22 RX ORDER — PANTOPRAZOLE SODIUM 40 MG/1
40 TABLET, DELAYED RELEASE ORAL
Qty: 30 TABLET | Refills: 0 | Status: SHIPPED | OUTPATIENT
Start: 2022-06-22 | End: 2022-07-20 | Stop reason: SDUPTHER

## 2022-06-22 RX ORDER — BLOOD SUGAR DIAGNOSTIC
STRIP MISCELLANEOUS
Qty: 100 EACH | Refills: 0 | Status: SHIPPED | OUTPATIENT
Start: 2022-06-22

## 2022-06-22 RX ORDER — BENZONATATE 100 MG/1
100 CAPSULE ORAL 3 TIMES DAILY PRN
Qty: 20 CAPSULE | Refills: 0 | Status: SHIPPED | OUTPATIENT
Start: 2022-06-22

## 2022-06-22 RX ORDER — HYDROCHLOROTHIAZIDE 12.5 MG/1
12.5 TABLET ORAL DAILY
Qty: 30 TABLET | Refills: 0 | Status: SHIPPED | OUTPATIENT
Start: 2022-06-22 | End: 2022-07-20 | Stop reason: SDUPTHER

## 2022-06-22 RX ADMIN — ENOXAPARIN SODIUM 40 MG: 40 INJECTION SUBCUTANEOUS at 09:01

## 2022-06-22 RX ADMIN — ATORVASTATIN CALCIUM 20 MG: 20 TABLET, FILM COATED ORAL at 08:44

## 2022-06-22 RX ADMIN — GUAIFENESIN AND DEXTROMETHORPHAN 10 ML: 100; 10 SYRUP ORAL at 05:11

## 2022-06-22 RX ADMIN — PANTOPRAZOLE SODIUM 40 MG: 40 TABLET, DELAYED RELEASE ORAL at 05:11

## 2022-06-22 RX ADMIN — DULOXETINE HYDROCHLORIDE 30 MG: 30 CAPSULE, DELAYED RELEASE ORAL at 08:44

## 2022-06-22 RX ADMIN — HYDROCHLOROTHIAZIDE 12.5 MG: 12.5 TABLET ORAL at 08:46

## 2022-06-22 RX ADMIN — GABAPENTIN 800 MG: 400 CAPSULE ORAL at 08:45

## 2022-06-22 RX ADMIN — IPRATROPIUM BROMIDE 0.5 MG: 0.5 SOLUTION RESPIRATORY (INHALATION) at 07:35

## 2022-06-22 RX ADMIN — GUAIFENESIN AND DEXTROMETHORPHAN 10 ML: 100; 10 SYRUP ORAL at 00:32

## 2022-06-22 RX ADMIN — LOSARTAN POTASSIUM 25 MG: 25 TABLET, FILM COATED ORAL at 08:46

## 2022-06-22 RX ADMIN — METHYLPREDNISOLONE SODIUM SUCCINATE 40 MG: 40 INJECTION, POWDER, FOR SOLUTION INTRAMUSCULAR; INTRAVENOUS at 08:47

## 2022-06-22 RX ADMIN — INSULIN LISPRO 4 UNITS: 100 INJECTION, SOLUTION INTRAVENOUS; SUBCUTANEOUS at 08:57

## 2022-06-22 RX ADMIN — LEVOTHYROXINE SODIUM 25 MCG: 25 TABLET ORAL at 05:11

## 2022-06-22 RX ADMIN — METOPROLOL TARTRATE 100 MG: 50 TABLET, FILM COATED ORAL at 08:43

## 2022-06-22 RX ADMIN — LEVALBUTEROL HYDROCHLORIDE 1.25 MG: 1.25 SOLUTION RESPIRATORY (INHALATION) at 07:35

## 2022-06-22 NOTE — DISCHARGE INSTR - AVS FIRST PAGE
You were treated for bronchitis, most likely due to viral infection  Recommend you continue to complete oral steroid taper with prednisone, S discussed important that you complete the entirety of the prednisone taper  Recommend he follow up with pulmonology in 2 weeks, recommend he start using daily inhaler Maddi Harris, prescription sent to pharmacy  He can also take [employer] as needed for cough  Recommend smoking cessation  Also recommend you do not keep taking lisinopril, he can start taking losartan for her blood pressure management  Follow up with the primary care provider within the next 1-2 weeks to monitor your blood pressure  Also, will send you a prescription for pantoprazole 40 mg daily for 1 month to help with reflux symptoms, will give a referral to follow up with ENT provider to evaluate any issues with her esophagus/throat as discussed

## 2022-06-22 NOTE — ASSESSMENT & PLAN NOTE
· H/o chronic dry cough for > 2 years after starting lisinopril  D/C lisinopril    · C/w losartan low-dose, outpatient follow up with PCP for further medication adjustments

## 2022-06-22 NOTE — ASSESSMENT & PLAN NOTE
· Home Medications: Abilify, Wellbutrin, Cymbalta, Atarax, and Trazadone  · Patient states she only takes gabapentin 800 mg 3 times a day and Klonopin 0 5 mg b i d  As needed for anxiety  · Patient states she stopped all other medications

## 2022-06-22 NOTE — ASSESSMENT & PLAN NOTE
· Presented with progressing SOB, wheezing suspected from viral bronchitis  · No Hx of COPD, asthma, although reports chornic cough and smoking history  · Good baseline functional status given pt can not ambulate up hill w/o any difficulty  · COVID/RSV/influenza: Negative  · CXR w/o any jeovanny consolidation  No URTI symptoms  · Patient's SOB and generalized weakness improved, wheezing resolved, still with chronic non-productive cough  · Mild leukocytosis today, likely steroid induced as VSS and procalcitonin normal   · Day 3 IV SoluMedrol, will discharge with 7 day oral prednisone taper  · Pulmonology consulted:  · Presentation likely viral in nature  Suggest ENT consultation on discharge  · Follow-up with pulmonology in 2 weeks, will discharge with Breo 100  · Respiratory protocol  · Incentive spirometry  · Continue albuterol, Xopenex nebs, Tessalon Perles p r n    · Smoking cessation was encouraged

## 2022-06-22 NOTE — ASSESSMENT & PLAN NOTE
· Currently not on any medication, given her wheezing will start her on pantoprazole    · Will discharge with Protonix 40mg daily for PPI trial, will give referral to follow-up with ENT

## 2022-06-22 NOTE — PLAN OF CARE
Problem: PAIN - ADULT  Goal: Verbalizes/displays adequate comfort level or baseline comfort level  Description: Interventions:  - Encourage patient to monitor pain and request assistance  - Assess pain using appropriate pain scale  - Administer analgesics based on type and severity of pain and evaluate response  - Implement non-pharmacological measures as appropriate and evaluate response  - Consider cultural and social influences on pain and pain management  - Notify physician/advanced practitioner if interventions unsuccessful or patient reports new pain  Outcome: Progressing     Problem: INFECTION - ADULT  Goal: Absence or prevention of progression during hospitalization  Description: INTERVENTIONS:  - Assess and monitor for signs and symptoms of infection  - Monitor lab/diagnostic results  - Monitor all insertion sites, i e  indwelling lines, tubes, and drains  - Monitor endotracheal if appropriate and nasal secretions for changes in amount and color  - [place] appropriate cooling/warming therapies per order  - Administer medications as ordered  - Instruct and encourage patient and family to use good hand hygiene technique  - Identify and instruct in appropriate isolation precautions for identified infection/condition  Outcome: Progressing     Problem: DISCHARGE PLANNING  Goal: Discharge to home or other facility with appropriate resources  Description: INTERVENTIONS:  - Identify barriers to discharge w/patient and caregiver  - Arrange for needed discharge resources and transportation as appropriate  - Identify discharge learning needs (meds, wound care, etc )  - Arrange for interpretive services to assist at discharge as needed  - Refer to Case Management Department for coordinating discharge planning if the patient needs post-hospital services based on physician/advanced practitioner order or complex needs related to functional status, cognitive ability, or social support system  Outcome: Progressing Problem: RESPIRATORY - ADULT  Goal: Achieves optimal ventilation and oxygenation  Description: INTERVENTIONS:  - Assess for changes in respiratory status  - Assess for changes in mentation and behavior  - Position to facilitate oxygenation and minimize respiratory effort  - Oxygen administered by appropriate delivery if ordered  - Initiate smoking cessation education as indicated  - Encourage broncho-pulmonary hygiene including cough, deep breathe, Incentive Spirometry  - Assess the need for suctioning and aspirate as needed  - Assess and instruct to report SOB or any respiratory difficulty  - Respiratory Therapy support as indicated  Outcome: Progressing     Problem: Potential for Falls  Goal: Patient will remain free of falls  Description: INTERVENTIONS:  - Educate patient/family on patient safety including physical limitations  - Instruct patient to call for assistance with activity   - Consult OT/PT to assist with strengthening/mobility   - Keep Call bell within reach  - Keep bed low and locked with side rails adjusted as appropriate  - Keep care items and personal belongings within reach  - Initiate and maintain comfort rounds  - Make Fall Risk Sign visible to staff  - Offer Toileting every 2 Hours, in advance of need  - Initiate/Maintain bed alarm  - Obtain necessary fall risk management equipment:   - Apply yellow socks and bracelet for high fall risk patients  - Consider moving patient to room near nurses station  Outcome: Progressing

## 2022-06-22 NOTE — ASSESSMENT & PLAN NOTE
Lab Results   Component Value Date    HGBA1C 6 8 (H) 06/19/2022     · Home Medications: Metformin, and Lantus 30 units Daily  · Continue sliding scale insulin coverage, home Lantus  · Patient with hyperglycemia, BG 200s -> Likely steroid induced  Will likely improve with tapering off steroids on discharge, can resume previous home meds on discharge    · Continue Diabetic Diet

## 2022-06-23 NOTE — UTILIZATION REVIEW
Notification of Discharge   This is a Notification of Discharge from our facility [address]  Please be advised that this patient has been discharge from our facility  Below you will find the admission and discharge date and time including the patients disposition  UTILIZATION REVIEW CONTACT:  [address] [place]  Utilization   Network Utilization Review Department  Phone: [phone] x carefully listen to the prompts  All voicemails are confidential   Email: [email or web address]     PHYSICIAN ADVISORY SERVICES:  FOR [id number] REVIEW - MEDICAL NECESSITY DENIAL  Phone: [phone]  Fax: [phone]  Email: [email or web address]     PRESENTATION DATE: 6/19/2022 10:06 AM  OBERVATION ADMISSION DATE:  INPATIENT ADMISSION DATE: 6/20/22 10:53 AM   DISCHARGE DATE: 6/22/2022 11:48 AM  DISPOSITION: Home/Self Care Home/Self Care      IMPORTANT INFORMATION:  Send all requests for admission clinical reviews, approved or denied determinations and any other requests to dedicated fax number below belonging to the campus where the patient is receiving treatment   List of dedicated fax numbers:  [address] DENIALS (Administrative/Medical Necessity) [phone]   [address] (Maternity/NICU/Pediatrics) [phone]   [facility] [phone]   130 [facility] [phone]   [address] [place] [address] [phone]   [address] [place] [address],[address] [phone]   [facility]  [phone]   2205 [facility]  2401 [facility] 1000 [facility] [phone]

## 2022-06-24 ENCOUNTER — TRANSITIONAL CARE MANAGEMENT (OUTPATIENT)
Dept: FAMILY MEDICINE CLINIC | Facility: CLINIC | Age: 57
End: 2022-06-24

## 2022-06-24 ENCOUNTER — OFFICE VISIT (OUTPATIENT)
Dept: FAMILY MEDICINE CLINIC | Facility: CLINIC | Age: 57
End: 2022-06-24
Payer: COMMERCIAL

## 2022-06-24 VITALS
TEMPERATURE: 96.7 F | HEART RATE: 80 BPM | HEIGHT: 63 IN | WEIGHT: 188.6 LBS | SYSTOLIC BLOOD PRESSURE: 150 MMHG | OXYGEN SATURATION: 96 % | BODY MASS INDEX: 33.42 KG/M2 | DIASTOLIC BLOOD PRESSURE: 80 MMHG | RESPIRATION RATE: 16 BRPM

## 2022-06-24 DIAGNOSIS — J40 BRONCHITIS WITH ACUTE WHEEZING: ICD-10-CM

## 2022-06-24 DIAGNOSIS — Z76.89 ENCOUNTER FOR SUPPORT AND COORDINATION OF TRANSITION OF CARE: Primary | ICD-10-CM

## 2022-06-24 PROCEDURE — 99496 TRANSJ CARE MGMT HIGH F2F 7D: CPT | Performed by: INTERNAL MEDICINE

## 2022-06-24 NOTE — ASSESSMENT & PLAN NOTE
Hospital record reviewed  Was treated for bronchitis with iv steroid  Now on Breo inhaler to be used for next 3-4 weeks  She is feeling around 60% better  She had negative influenza COVID and RSV  Not bringing up any mucus  Appetite okay  No abdominal pain or epigastric pain  Tolerating prednisone pills well  Advised to monitor symptoms  If any change in symptoms or any discolored mucus or fever or chills, advised to give me a call

## 2022-06-24 NOTE — PROGRESS NOTES
Assessment/Plan:         Problem List Items Addressed This Visit        Respiratory    Bronchitis with acute wheezing     Was treated in hospital  breo given from hospital for 1 month              Other    Encounter for support and coordination of transition of care [facility] record reviewed  Was treated for bronchitis with iv steroid  Now on Breo inhaler to be used for next 3-4 weeks  She is feeling around 60% better  She had negative influenza COVID and RSV  Not bringing up any mucus  Appetite okay  No abdominal pain or epigastric pain  Tolerating prednisone pills well  Advised to monitor symptoms  If any change in symptoms or any discolored mucus or fever or chills, advised to give me a call  Subjective:      Patient ID: Vickii Josué is a 49 y o  female  1  Transition of care management  She was admitted and discharged to home on 19th of June 5 days ago after being treated for acute bronchitis due to viral infection  She is now on Breo and tapering prednisone pills with benzonatate  Feeling around 60% better  No discolored mucus  No fever or chills  No chest pain  No epigastric pain nausea or vomiting  No abdominal pain or diarrhea  No sore throat  No lightheadedness or headaches  Appetite normal   Hospital records reviewed        The following portions of the patient's history were reviewed and updated as appropriate:   Past Medical History:  She has a past medical history of Addiction to drug ([address] ), Adjustment disorder, Alcohol abuse, Alcoholism ([address] ), Anxiety, Bipolar disorder ([address] ), Bowel obstruction ([address] ), Depression, Diabetes type 2, controlled ([address] ), Disease of thyroid gland, Gastritis, Head injury, Heart palpitations, Hyperlipidemia, Hypertension, Hypothyroidism, Psychiatric illness, PTSD (post-traumatic stress disorder), Seizure ([address] ), Seizures ([address] ), Sleep difficulties, Substance abuse ([address] ), Suicide attempt ([address] ), and Withdrawal symptoms, drug or narcotic ([address] )  ,  _______________________________________________________________________  Medical Problems:  does not have any pertinent problems on file ,  _______________________________________________________________________  Past Surgical History:   has a past surgical history that includes Bowel resection; Abdominal surgery; Hysterectomy; Knee surgery (Bilateral); Esophagogastroduodenoscopy (N/A, 5/18/2018); Appendectomy; and Cholecystectomy  ,  _______________________________________________________________________  Family History:  family history includes Bipolar disorder in her mother; Diabetes in her father ,  _______________________________________________________________________  Social History:   reports that she has been smoking cigarettes  She has a 12 50 pack-year smoking history  She has never used smokeless tobacco  She reports previous alcohol use of about 6 0 standard drinks of alcohol per week  She reports that she does not use drugs  ,  _______________________________________________________________________  Allergies:  is allergic to losartan and latex     _______________________________________________________________________  Current Outpatient Medications   Medication Sig Dispense Refill    Alcohol Swabs 70 % PADS May substitute brand based on insurance coverage  Check glucose TID  100 each 0    atorvastatin (LIPITOR) 20 mg tablet Take 1 tablet (20 mg total) by mouth daily 90 tablet 3    benzonatate (TESSALON PERLES) 100 mg capsule Take 1 capsule (100 mg total) by mouth 3 (three) times a day as needed for cough 20 capsule 0    benztropine (COGENTIN) 1 mg tablet Take 1 mg by mouth daily      Blood Glucose Monitoring Suppl (OneTouch Verio Reflect) w/Device KIT May substitute brand based on insurance coverage   Check glucose TID  1 kit 0    clonazePAM (KlonoPIN) 0 5 mg tablet TAKE 1 TABLET(0 5 MG) BY MOUTH TWICE DAILY AS NEEDED FOR SEIZURES 60 tablet 1    DULoxetine (CYMBALTA) 30 mg delayed release capsule Take 1 capsule (30 mg total) by mouth daily 30 capsule 2    ergocalciferol (VITAMIN D2) 50,000 units TAKE 1 CAPSULE BY MOUTH 1 TIME A WEEK 12 capsule 1    folic acid (FOLVITE) 1 mg tablet Take by mouth daily      gabapentin (NEURONTIN) 400 mg capsule Take 2 capsules (800 mg total) by mouth 3 (three) times a day 90 capsule 0    glucose blood (OneTouch Verio) test strip May substitute brand based on insurance coverage  Check glucose TID  100 each 0    glucose blood test strip Check sugars 5 times a week 100 strip 1    hydrochlorothiazide (HYDRODIURIL) 12 5 mg tablet Take 1 tablet (12 5 mg total) by mouth daily 30 tablet 0    hydrOXYzine HCL (ATARAX) 50 mg tablet Take 1 tablet (50 mg total) by mouth 2 (two) times a day as needed for anxiety 30 tablet 3    levothyroxine 25 mcg tablet Take 1 tablet (25 mcg total) by mouth daily in the early morning 7 tablet 0    metFORMIN (GLUCOPHAGE) 1000 MG tablet TAKE 1 TABLET(1000 MG) BY MOUTH TWICE DAILY WITH MEALS 180 tablet 1    metoprolol tartrate (LOPRESSOR) 100 mg tablet Take 1 tablet (100 mg total) by mouth every 12 (twelve) hours 180 tablet 0    OneTouch Delica Lancets 27O MISC May substitute brand based on insurance coverage  Check glucose TID  100 each 0    pantoprazole (PROTONIX) 40 mg tablet Take 1 tablet (40 mg total) by mouth daily in the early morning 30 tablet 0    predniSONE 20 mg tablet Take 2 tablets (40 mg total) by mouth daily for 2 days, THEN 1 5 tablets (30 mg total) daily for 2 days, THEN 1 tablet (20 mg total) daily for 2 days, THEN 0 5 tablets (10 mg total) daily for 1 day   10 tablet 0    traZODone (DESYREL) 100 mg tablet TAKE 2 TABLETS(200 MG) BY MOUTH DAILY AT BEDTIME 60 tablet 2    ARIPiprazole (ABILIFY) 5 mg tablet TAKE 1 TABLET(5 MG) BY MOUTH DAILY (Patient not taking: Reported on 6/19/2022) 90 tablet 1    buPROPion (WELLBUTRIN SR) 150 mg 12 hr tablet 150 mg daily   (Patient not taking: Reported on 6/19/2022)  cloNIDine (CATAPRES) 0 1 mg tablet Take 1 tablet (0 1 mg total) by mouth daily at bedtime (Patient not taking: Reported on 6/19/2022) 30 tablet 1    fluticasone-vilanterol (Breo Ellipta) 100-25 mcg/inh inhaler Inhale 1 puff daily Rinse mouth after use  60 blister 0    insulin glargine (LANTUS) 100 units/mL subcutaneous injection Inject 30 Units under the skin daily at bedtime 10 mL 0    Microlet Lancets MISC  (Patient not taking: Reported on 6/19/2022)       No current facility-administered medications for this visit      _______________________________________________________________________  Review of Systems   Constitutional: Negative for appetite change, chills, diaphoresis, fatigue and fever  HENT: Negative for congestion, drooling and sinus pain  Eyes: Negative for discharge and itching  Respiratory: Positive for cough, shortness of breath (Mild on exertion  Better ) and wheezing  Negative for chest tightness  Cardiovascular: Negative for chest pain and palpitations  Gastrointestinal: Negative  Endocrine: Negative for polyphagia and polyuria  Genitourinary: Negative for difficulty urinating, dysuria, frequency and urgency  Skin: Negative for pallor and rash  Allergic/Immunologic: Negative for food allergies  Neurological: Negative for dizziness, seizures, speech difficulty, light-headedness, numbness and headaches  Hematological: Negative for adenopathy  Does not bruise/bleed easily  Psychiatric/Behavioral: Negative for agitation, confusion, decreased concentration, dysphoric mood and suicidal ideas  Objective:  Vitals:    06/24/22 1154   BP: 150/80   BP Location: Left arm   Patient Position: Sitting   Cuff Size: Large   Pulse: 80   Resp: 16   Temp: (!) 96 7 °F (35 9 °C)   TempSrc: Temporal   SpO2: 96%   Weight: 85 5 kg (188 lb 9 6 oz)   Height: 5' 3" (1 6 m)     Body mass index is 33 41 kg/m²  Physical Exam  Vitals and nursing note reviewed     Constitutional: Appearance: Normal appearance  She is well-developed  She is not ill-appearing or diaphoretic  HENT:      Head: Atraumatic  Eyes:      General:         Right eye: No discharge  Left eye: No discharge  Neck:      Thyroid: No thyromegaly  Cardiovascular:      Rate and Rhythm: Normal rate and regular rhythm  Pulmonary:      Effort: Pulmonary effort is normal       Breath sounds: Wheezing (Mild expiratory wheezing  Slightly diminished breath sound bilaterally) present  Chest:      Chest wall: No tenderness  Abdominal:      General: Bowel sounds are normal       Palpations: Abdomen is soft  Tenderness: There is no abdominal tenderness  Musculoskeletal:         General: No tenderness  Cervical back: Neck supple  Right lower leg: No edema  Left lower leg: No edema  Lymphadenopathy:      Cervical: No cervical adenopathy  Skin:     Findings: No erythema  Neurological:      Mental Status: She is alert and oriented to person, place, and time     Psychiatric:         Mood and Affect: Mood normal          Behavior: Behavior normal          Judgment: Judgment normal

## 2022-06-27 NOTE — PROGRESS NOTES
TCM Call (since 5/27/2022)     Date and time call was made  6/24/2022 12:05 PM    Hospital care reviewed  Records reviewed    Patient was hospitialized at  [address]    Date of Admission  06/19/22    Date of discharge  06/22/22    Diagnosis  bronchitis    Disposition  Home      TCM Call (since 5/27/2022)     Scheduled for follow up?   Yes    Patients specialists  Pulmonlolgist    I have advised the patient to call PCP with any new or worsening symptoms  [address] [address]

## 2022-07-20 ENCOUNTER — TELEPHONE (OUTPATIENT)
Dept: FAMILY MEDICINE CLINIC | Facility: CLINIC | Age: 57
End: 2022-07-20

## 2022-07-20 DIAGNOSIS — F31.4 BIPOLAR DISORDER, CURRENT EPISODE DEPRESSED, SEVERE, WITHOUT PSYCHOTIC FEATURES (HCC): ICD-10-CM

## 2022-07-20 DIAGNOSIS — I10 PRIMARY HYPERTENSION: ICD-10-CM

## 2022-07-20 DIAGNOSIS — J40 BRONCHITIS: ICD-10-CM

## 2022-07-20 RX ORDER — HYDROCHLOROTHIAZIDE 12.5 MG/1
12.5 TABLET ORAL DAILY
Qty: 30 TABLET | Refills: 0 | Status: SHIPPED | OUTPATIENT
Start: 2022-07-20 | End: 2022-07-20

## 2022-07-20 RX ORDER — PANTOPRAZOLE SODIUM 40 MG/1
40 TABLET, DELAYED RELEASE ORAL
Qty: 30 TABLET | Refills: 0 | Status: SHIPPED | OUTPATIENT
Start: 2022-07-20 | End: 2022-07-20

## 2022-07-20 RX ORDER — HYDROCHLOROTHIAZIDE 12.5 MG/1
TABLET ORAL
Qty: 90 TABLET | Refills: 0 | Status: SHIPPED | OUTPATIENT
Start: 2022-07-20 | End: 2022-10-10

## 2022-07-20 RX ORDER — DULOXETIN HYDROCHLORIDE 30 MG/1
30 CAPSULE, DELAYED RELEASE ORAL DAILY
Qty: 30 CAPSULE | Refills: 2 | Status: SHIPPED | OUTPATIENT
Start: 2022-07-20

## 2022-07-20 RX ORDER — PANTOPRAZOLE SODIUM 40 MG/1
TABLET, DELAYED RELEASE ORAL
Qty: 90 TABLET | Refills: 0 | Status: SHIPPED | OUTPATIENT
Start: 2022-07-20 | End: 2022-07-29 | Stop reason: SDUPTHER

## 2022-07-20 NOTE — TELEPHONE ENCOUNTER
Patient needs refill on hydrochlorothiazide   Duloxetine, pantroprazole and losartan?   [address]

## 2022-07-20 NOTE — TELEPHONE ENCOUNTER
Patient called requesting refills on the following medications  Pantoprazole 40 mg TAB, duloxetine 30 mg CAP and hydrochlorothiazide 12 5 mg TAB  Patient also request refill on Losartan but is not on current medication list  Refills are being sent to the pharmacy at this time

## 2022-07-20 NOTE — TELEPHONE ENCOUNTER
Medication sent to the pharmacy at this time   However losartan was not on patients current medication list

## 2022-07-23 DIAGNOSIS — E55.9 VITAMIN D DEFICIENCY: ICD-10-CM

## 2022-07-25 RX ORDER — ERGOCALCIFEROL 1.25 MG/1
CAPSULE ORAL
Qty: 12 CAPSULE | Refills: 1 | Status: SHIPPED | OUTPATIENT
Start: 2022-07-25

## 2022-07-29 ENCOUNTER — TELEPHONE (OUTPATIENT)
Dept: FAMILY MEDICINE CLINIC | Facility: CLINIC | Age: 57
End: 2022-07-29

## 2022-07-29 DIAGNOSIS — F31.4 BIPOLAR DISORDER, CURRENT EPISODE DEPRESSED, SEVERE, WITHOUT PSYCHOTIC FEATURES (HCC): ICD-10-CM

## 2022-07-29 DIAGNOSIS — F31.9 BIPOLAR I DISORDER WITH ANXIOUS DISTRESS (HCC): ICD-10-CM

## 2022-07-29 DIAGNOSIS — J40 BRONCHITIS: ICD-10-CM

## 2022-07-29 RX ORDER — GABAPENTIN 400 MG/1
800 CAPSULE ORAL 3 TIMES DAILY
Qty: 180 CAPSULE | Refills: 3 | Status: SHIPPED | OUTPATIENT
Start: 2022-07-29 | End: 2022-08-01

## 2022-07-29 RX ORDER — TRAZODONE HYDROCHLORIDE 100 MG/1
200 TABLET ORAL
Qty: 60 TABLET | Refills: 2 | Status: SHIPPED | OUTPATIENT
Start: 2022-07-29

## 2022-07-29 RX ORDER — PANTOPRAZOLE SODIUM 40 MG/1
40 TABLET, DELAYED RELEASE ORAL DAILY
Qty: 90 TABLET | Refills: 1 | Status: SHIPPED | OUTPATIENT
Start: 2022-07-29

## 2022-07-29 RX ORDER — ARIPIPRAZOLE 5 MG/1
5 TABLET ORAL DAILY
Qty: 90 TABLET | Refills: 1 | Status: SHIPPED | OUTPATIENT
Start: 2022-07-29

## 2022-07-29 RX ORDER — CLONAZEPAM 0.5 MG/1
0.5 TABLET ORAL 2 TIMES DAILY PRN
Qty: 60 TABLET | Refills: 2 | Status: SHIPPED | OUTPATIENT
Start: 2022-07-29

## 2022-07-29 NOTE — TELEPHONE ENCOUNTER
Patient needs refills on:    gabapentin (NEURONTIN) 400 mg capsule     traZODone (DESYREL) 100 mg tablet     Hansa    clonazePAM (KlonoPIN) 0 5 mg tablet     ARIPiprazole (ABILIFY) 5 mg tablet     pantoprazole (PROTONIX) 40 mg tablet     hortencia Coello

## 2022-08-01 DIAGNOSIS — F31.9 BIPOLAR I DISORDER WITH ANXIOUS DISTRESS (HCC): Primary | ICD-10-CM

## 2022-08-01 RX ORDER — GABAPENTIN 800 MG/1
800 TABLET ORAL 3 TIMES DAILY
Qty: 90 TABLET | Refills: 4 | Status: SHIPPED | OUTPATIENT
Start: 2022-08-01

## 2022-08-14 NOTE — DISCHARGE INSTRUCTIONS
Please return to the emergency department if you develop chest pain, shortness of breath, recurrent palpitations, any other symptoms that concern you  Call your primary care doctor to arrange follow-up within the next few days and get your magnesium rechecked  Call Cardiology to arrange evaluation as soon as you are able  Writer observed patient outside 1300 group.  Writer redirected patient back to group.  Patient became agitated and began yelling loudly at writer stating, \"I already know what happens.\"  \"I have other stuff to work on.\".  Patient remained out of group and was observed pacing back and forth during the remainder of group. Writer notified therapist and RN.

## 2022-08-26 NOTE — H&P
Double the dose of levothyroxine on Saturdays and Sundays when pregnant. Call the clinic so thyroid labs can be checked at that time.    Psychiatric Evaluation - [address] 48 y o  female MRN: [id number]  Unit/Bed#: [facility] 379-02 Encounter: [id number]    Assessment/Plan   Principal Problem:    Bipolar I disorder, most recent episode depressed, severe without psychotic features ([address] )  Active Problems:    Alcohol use disorder, moderate, dependence ([facility])    Post-traumatic stress disorder, chronic    Generalized anxiety disorder    Plan:   Start Lithium 300 mg po bid  Continue Latuda 80 mg daily  Gabapentin 400 mg tid 600 mg qhs  Doxepin 50 mg qhs  D/C trazodone  D/C Lamictal  Taper off Venlafaxine ER  Continue Folic Acid and Thiamine  Risks, benefits and possible side effects of Medications:   Risks, benefits, and possible side effects of medications explained to patient and patient verbalizes understanding  Chief Complaint: s/p SA by medication OD    History of Present Illness     Patient is a 48 y o  female presents with Signs of suicidal potential   Patient was admitted to psychiatric unit on a voluntarily 201 commitment basis  Primary complaints include: depression worse, feeling depressed and feeling suicidal   Onset of symptoms was abrupt starting 1 day ago with unchanged course since that time  Psychosocial Stressors: family, financial and health  Patient stated she had recently been in Rehab for alcohol abuse and had been sober for close to a month but recently relapsed on alcohol and triggered her depression and her suicide attempt  She stated the decision to OD on trazodone was impulsive and regrets her actions  She stated her current stressors is her relationship with her oldest son as she wishes she could have a closer relationship with him especially now that his GF is pregnant  Patient has hx of Bipolar disorder and goes to [facility] for counseling and medication management  She stated on her last visit she had been started on lithium   I agree with starting lithium for mood stabilization and continue Latuda 80 mg  Will d/c Lamictal and gradually taper off Venlafaxine  She denies A/V hallucinations and current denies SI or HI  Continue to monitor for alcohol withdrawal        Psychiatric Review Of Systems:  sleep: yes insomnia  appetite changes: no  weight changes: no  energy/anergy: yes  interest/pleasure/anhedonia: yes  somatic symptoms: yes palpitations  anxiety/panic: yes  doc: no  guilty/hopeless: yes  self injurious behavior/risky behavior: no    Historical Information     Past Psychiatric History:   Therapy, Out Patient [facility] and In Patient multiple previous admissions  Currently in treatment at [facility] for medication management and psychotherapy  Past Suicide attempts: yes, multiple times  Past Violent behavior: denies  Past Psychiatric medication trial: multiple    Substance Abuse History:  Social History     Tobacco History     Smoking Status  Current Every Day Smoker Smoking Frequency  1 pack/day for 25 years (25 pk yrs) Smoking Tobacco Type  Cigarettes    Smokeless Tobacco Use  Never Used          Alcohol History     Alcohol Use Status  Yes Drinks/Week  21 Cans of beer per week Amount  21 0 standard drinks of alcohol/wk Comment  As of 11/14, reports 7 large beers about 2-3 times a week            Drug Use     Drug Use Status  No          Sexual Activity     Sexually Active  Not Currently          Activities of Daily Living    Not Asked               Additional Substance Use Detail     Questions Responses    Substance Use Assessment Substance use within the past 12 months    Alcohol Use Frequency 3 or more times/week    Cannabis frequency Past rare use    Comment: Never used on 5/25/2019 Never used ->Past rare use on 9/21/2019     Heroin Frequency Denies use in past 12 months    Alcohol Drink of Choice beer    Cannabis method Smoke    Comment: Smoke on 9/21/2019     1st Use of Alcohol 6-12    Last Use of Alcohol & Amount 11/14/19@**    Longest Abstinence from Alcohol unknown    Cocaine frequency Never used Comment: Never used on 5/25/2019     Crack Cocaine Frequency Denies use in past 12 months    Methamphetamine Frequency Denies use in past 12 months    Narcotic Frequency Denies use in past 12 months    Benzodiazepine Frequency Denies use in past 12 months    Amphetamine frequency Denies use in past 12 months    Barbituate Frequency Denies use use in past 12 months    Inhalant frequency Never used    Comment: Never used on 5/25/2019     Hallucinogen frequency Never used    Comment: Never used on 5/25/2019     Ecstasy frequency Never used    Comment: Never used on 5/25/2019     Other drug frequency Never used    Comment: Never used on 5/25/2019     Opiate frequency Denies use in past 12 months    Last reviewed by Dexter Morgan MD on 2/11/2020        I have assessed this patient for substance use within the past 12 months    Family Psychiatric History:   Psychiatric Illness denies Hospitalization: denies    Social History:  Education: high school diploma/GED  Learning Disabilities: denies  Marital history: single  Living arrangement, social support: The patient lives with her oldest son  Occupational History: on permanent disability  Functioning Relationships: poor relationship with children  Other Pertinent History: None      Traumatic History:   Abuse: denies  Other Traumatic Events: n/a    Past Medical History:   Diagnosis Date    Alcohol abuse     [facility]     Anxiety     Bipolar disorder (HCC)     Bowel obstruction (HCC)     Depression     Gastritis     Head injury     Heart palpitations     Hyperlipidemia     Hypertension     Hypothyroidism     PTSD (post-traumatic stress disorder)     Seizure ([facility] )     Sleep difficulties     Suicide attempt [facility])        Medical Review Of Systems:  Pertinent items are noted in HPI      Meds/Allergies   all current active meds have been reviewed and current meds:   Current Facility-Administered Medications   Medication Dose Route Frequency    aluminum-magnesium hydroxide-simethicone (MYLANTA) 200-200-20 mg/5 mL oral suspension 30 mL  30 mL Oral Q4H PRN    atorvastatin (LIPITOR) tablet 40 mg  40 mg Oral Daily    benztropine (COGENTIN) injection 1 mg  1 mg Intramuscular Q6H PRN    benztropine (COGENTIN) tablet 1 mg  1 mg Oral Q6H PRN    cloNIDine (CATAPRES) tablet 0 1 mg  0 1 mg Oral BID    doxepin (SINEquan) capsule 50 mg  50 mg Oral HS    folic acid (FOLVITE) tablet 1 mg  1 mg Oral Daily    gabapentin (NEURONTIN) capsule 400 mg  400 mg Oral TID    gabapentin (NEURONTIN) capsule 600 mg  600 mg Oral HS    haloperidol (HALDOL) tablet 5 mg  5 mg Oral Q8H PRN    haloperidol lactate (HALDOL) injection 5 mg  5 mg Intramuscular Q6H PRN    ibuprofen (MOTRIN) tablet 400 mg  400 mg Oral Q8H PRN    ibuprofen (MOTRIN) tablet 600 mg  600 mg Oral Q8H PRN    ibuprofen (MOTRIN) tablet 800 mg  800 mg Oral Q8H PRN    levothyroxine tablet 50 mcg  50 mcg Oral Early Morning    lithium carbonate capsule 300 mg  300 mg Oral BID    LORazepam (ATIVAN) injection 1 mg  1 mg Intramuscular Q6H PRN    LORazepam (ATIVAN) tablet 0 5 mg  0 5 mg Oral Q8H PRN    lurasidone (LATUDA) tablet 80 mg  80 mg Oral Daily With Dinner    magnesium hydroxide (MILK OF MAGNESIA) 400 mg/5 mL oral suspension 30 mL  30 mL Oral Daily PRN    multivitamin-minerals (CENTRUM) tablet 1 tablet  1 tablet Oral Daily    nicotine (NICODERM CQ) 21 mg/24 hr TD 24 hr patch 1 patch  1 patch Transdermal Daily    nicotine polacrilex (NICORETTE) gum 2 mg  2 mg Oral Q2H PRN    pantoprazole (PROTONIX) EC tablet 40 mg  40 mg Oral Early Morning    risperiDONE (RisperDAL M-TABS) dispersible tablet 1 mg  1 mg Oral Q3H PRN    sucralfate (CARAFATE) tablet 1 g  1 g Oral 4x Daily (AC & HS)    thiamine (VITAMIN B1) tablet 100 mg  100 mg Oral Daily    venlafaxine (EFFEXOR-XR) 24 hr capsule 75 mg  75 mg Oral Daily    ziprasidone (GEODON) capsule 20 mg  20 mg Oral Q4H PRN    ziprasidone (GEODON) IM injection 20 mg  20 mg Intramuscular Q4H PRN    zolpidem (AMBIEN) tablet 5 mg  5 mg Oral HS PRN     Allergies   Allergen Reactions    Latex Rash       Objective   Vital signs in last 24 hours:  Temp:  [97 °F (36 1 °C)-97 8 °F (36 6 °C)] 97 8 °F (36 6 °C)  HR:  [] 102  Resp:  [11-27] 16  BP: (117-174)/() 174/88      Intake/Output Summary (Last 24 hours) at 2/11/2020 1108  Last data filed at 2/10/2020 2206  Gross per 24 hour   Intake 1000 ml   Output    Net 1000 ml       Mental Status Evaluation:  Appearance:  age appropriate and disheveled   Behavior:  cooperative   Speech:  normal pitch and normal volume   Mood:  constricted, depressed and dysthymic   Affect:  constricted   Language: anomia No and aphasia  No   Thought Process:  goal directed and logical   Thought Content:  normal   Perceptual Disturbances: None   Risk Potential: Suicidal Ideations none, Homicidal Ideations none and Potential for Aggression No   Sensorium:  person, place and time/date   Cognition:  grossly intact   Consciousness:  alert and awake    Attention: attention span appeared shorter than expected for age   Intellect: not examined   Fund of Knowledge: awareness of current events: seems adequate   Insight:  limited   Judgment: limited   Muscle Strength and Tone: not examined   Gait/Station: normal gait/station and normal balance   Motor Activity: no abnormal movements     Lab Results: I have personally reviewed pertinent lab results      Imaging Studies: n/a  EKG, Pathology, and Other Studies: EKG WNL    Code Status: Level 1 - Full Code  Advance Directive and Living Will:      Power of :    POLST:        Patient Strengths/Assets: cooperative, patient is on a voluntary commitment    Patient Barriers/Limitations: difficulty adapting, lack of social/family support, limited family ties, limited motivation, low self esteem, poor self-care, poor support system, substance abuse    Counseling / Coordination of Care  Total floor / unit time spent today n/a minutes  Greater than 50% of total time was spent with the patient and / or family counseling and / or coordination of care   A description of the counseling / coordination of care:

## 2022-09-15 ENCOUNTER — TELEPHONE (OUTPATIENT)
Dept: PSYCHIATRY | Facility: CLINIC | Age: 57
End: 2022-09-15

## 2022-09-15 NOTE — TELEPHONE ENCOUNTER
Patients Name: Naman Keenan  : 1965  Phone Number: [phone]  Appointment Date: 22  Appointment time: 10:00 a m    Address: [address] [place] [address]  Drop off Facility/Office Name: [facility] [address] [place] [address] [facility]   Drop off  Address: [address] [place] [address]: [phone]  Special notes/directions for   Note for scheduling team

## 2022-09-15 NOTE — TELEPHONE ENCOUNTER
Behavorial Health Outpatient Intake Questions    Referred by:  Self      Please advised interviewee that they need to answer all questions truthfully to allow for best care and any misrepresentations of information may affect their ability to be seen at this clinic   => Was this discussed? Yes     [facility] -     Presenting Problem (in patient's words): Anxiety, depression, PTSD, prior suicide attempt      Are there any developmental disabilities? ? If yes, can they speak to you on the phone? If they are too limited to speak to you on phone, refer out No    Are you taking any psychiatric medications? Yes    => If yes, who prescribes? If yes, are they injectable medications? Gabapentin, hydroxocine,     Does the patient have a language barrier or hearing impairment? No    Have you been treated at [facility] by a therapist or a doctor in the past? If yes, who? No    Has the patient been hospitalized for mental health? Yes   If yes, how long ago was last hospitalization and where was it?  2020    Do you actively use alcohol or marijuana or illegal substances? If yes, what and how much - refer out to Drug and alcohol treatment if use is excessive or daily use of illegal substances No concerns of substance abuse are reported  Do you have a community treatment team or ? No    Legal History-     Does the patient have any history of arrests, retirement/FCI time, or DUIs? No  If Yes-  1) What types of charges? 2) When were they last incarcerated? 3) Are they currently on parole or probation? Minor Child-    Who has custody of the child? Is there a custody agreement? If there is a custody agreement remind parent that they must bring a copy to the first appt or they will not be seen       Intake Team, please check with provider before scheduling if flags come up such as:  - complex case  - legal history (other than DUI)  - communication barrier concerns are present  - if, in your judgment, this needs further review    ACCEPTED as a patient Yes  => Appointment Date: [facility] 9/21 @ 10a [place] 10/28 @ 10a    Referred Elsewhere? No    Name of Insurance Co: [facility] [employer] ID# [id number]  [id number] Phone #  If ins is primary or secondary  If patient is a minor, parents information such as Name, D  O B of guarantor

## 2022-09-21 ENCOUNTER — TELEPHONE (OUTPATIENT)
Dept: PSYCHIATRY | Facility: CLINIC | Age: 57
End: 2022-09-21

## 2022-09-21 ENCOUNTER — VBI (OUTPATIENT)
Dept: ADMINISTRATIVE | Facility: OTHER | Age: 57
End: 2022-09-21

## 2022-09-27 DIAGNOSIS — F31.4 BIPOLAR DISORDER, CURRENT EPISODE DEPRESSED, SEVERE, WITHOUT PSYCHOTIC FEATURES (HCC): ICD-10-CM

## 2022-09-28 RX ORDER — HYDROXYZINE 50 MG/1
TABLET, FILM COATED ORAL
Qty: 30 TABLET | Refills: 3 | Status: SHIPPED | OUTPATIENT
Start: 2022-09-28

## 2022-10-12 PROBLEM — Z00.00 MEDICARE ANNUAL WELLNESS VISIT, INITIAL: Status: RESOLVED | Noted: 2021-12-09 | Resolved: 2022-10-12

## 2022-10-28 ENCOUNTER — TELEPHONE (OUTPATIENT)
Dept: PSYCHIATRY | Facility: CLINIC | Age: 57
End: 2022-10-28

## 2022-10-31 ENCOUNTER — HOSPITAL ENCOUNTER (EMERGENCY)
Facility: HOSPITAL | Age: 57
End: 2022-11-02
Attending: EMERGENCY MEDICINE

## 2022-10-31 DIAGNOSIS — T50.902A INTENTIONAL DRUG OVERDOSE, INITIAL ENCOUNTER (HCC): Primary | ICD-10-CM

## 2022-10-31 DIAGNOSIS — T14.91XA SUICIDE ATTEMPT (HCC): ICD-10-CM

## 2022-10-31 LAB
ALBUMIN SERPL BCP-MCNC: 4.2 G/DL (ref 3.5–5)
ALP SERPL-CCNC: 93 U/L (ref 34–104)
ALT SERPL W P-5'-P-CCNC: 30 U/L (ref 7–52)
ANION GAP SERPL CALCULATED.3IONS-SCNC: 10 MMOL/L (ref 4–13)
APAP SERPL-MCNC: <10 UG/ML (ref 10–20)
AST SERPL W P-5'-P-CCNC: 18 U/L (ref 13–39)
BASOPHILS # BLD AUTO: 0.06 THOUSANDS/ÂΜL (ref 0–0.1)
BASOPHILS NFR BLD AUTO: 0 % (ref 0–1)
BILIRUB SERPL-MCNC: 0.53 MG/DL (ref 0.2–1)
BUN SERPL-MCNC: 7 MG/DL (ref 5–25)
CALCIUM SERPL-MCNC: 9.6 MG/DL (ref 8.4–10.2)
CHLORIDE SERPL-SCNC: 99 MMOL/L (ref 96–108)
CO2 SERPL-SCNC: 26 MMOL/L (ref 21–32)
CREAT SERPL-MCNC: 0.74 MG/DL (ref 0.6–1.3)
EOSINOPHIL # BLD AUTO: 0.11 THOUSAND/ÂΜL (ref 0–0.61)
EOSINOPHIL NFR BLD AUTO: 1 % (ref 0–6)
ERYTHROCYTE [DISTWIDTH] IN BLOOD BY AUTOMATED COUNT: 13.2 % (ref 11.6–15.1)
ETHANOL EXG-MCNC: 0 MG/DL
ETHANOL SERPL-MCNC: <10 MG/DL
GFR SERPL CREATININE-BSD FRML MDRD: 91 ML/MIN/1.73SQ M
GLUCOSE SERPL-MCNC: 213 MG/DL (ref 65–140)
GLUCOSE SERPL-MCNC: 229 MG/DL (ref 65–140)
HCT VFR BLD AUTO: 41.5 % (ref 34.8–46.1)
HGB BLD-MCNC: 14 G/DL (ref 11.5–15.4)
IMM GRANULOCYTES # BLD AUTO: 0.06 THOUSAND/UL (ref 0–0.2)
IMM GRANULOCYTES NFR BLD AUTO: 0 % (ref 0–2)
LYMPHOCYTES # BLD AUTO: 4.17 THOUSANDS/ÂΜL (ref 0.6–4.47)
LYMPHOCYTES NFR BLD AUTO: 28 % (ref 14–44)
MCH RBC QN AUTO: 29.5 PG (ref 26.8–34.3)
MCHC RBC AUTO-ENTMCNC: 33.7 G/DL (ref 31.4–37.4)
MCV RBC AUTO: 88 FL (ref 82–98)
MONOCYTES # BLD AUTO: 1.14 THOUSAND/ÂΜL (ref 0.17–1.22)
MONOCYTES NFR BLD AUTO: 8 % (ref 4–12)
NEUTROPHILS # BLD AUTO: 9.22 THOUSANDS/ÂΜL (ref 1.85–7.62)
NEUTS SEG NFR BLD AUTO: 63 % (ref 43–75)
NRBC BLD AUTO-RTO: 0 /100 WBCS
PLATELET # BLD AUTO: 383 THOUSANDS/UL (ref 149–390)
PMV BLD AUTO: 9.8 FL (ref 8.9–12.7)
POTASSIUM SERPL-SCNC: 3.9 MMOL/L (ref 3.5–5.3)
PROT SERPL-MCNC: 7.3 G/DL (ref 6.4–8.4)
RBC # BLD AUTO: 4.74 MILLION/UL (ref 3.81–5.12)
SALICYLATES SERPL-MCNC: <5 MG/DL (ref 3–20)
SODIUM SERPL-SCNC: 135 MMOL/L (ref 135–147)
TSH SERPL DL<=0.05 MIU/L-ACNC: 6.07 UIU/ML (ref 0.45–4.5)
WBC # BLD AUTO: 14.76 THOUSAND/UL (ref 4.31–10.16)

## 2022-10-31 RX ORDER — IPRATROPIUM BROMIDE AND ALBUTEROL SULFATE 2.5; .5 MG/3ML; MG/3ML
3 SOLUTION RESPIRATORY (INHALATION)
Status: DISCONTINUED | OUTPATIENT
Start: 2022-10-31 | End: 2022-11-02

## 2022-10-31 RX ORDER — CLONAZEPAM 0.5 MG/1
0.5 TABLET ORAL ONCE
Status: COMPLETED | OUTPATIENT
Start: 2022-10-31 | End: 2022-10-31

## 2022-10-31 RX ADMIN — SODIUM CHLORIDE 1000 ML: 0.9 INJECTION, SOLUTION INTRAVENOUS at 20:45

## 2022-10-31 RX ADMIN — IPRATROPIUM BROMIDE AND ALBUTEROL SULFATE 3 ML: 2.5; .5 SOLUTION RESPIRATORY (INHALATION) at 22:31

## 2022-10-31 RX ADMIN — CLONAZEPAM 0.5 MG: 0.5 TABLET ORAL at 23:15

## 2022-11-01 LAB
AMPHETAMINES SERPL QL SCN: NEGATIVE
ATRIAL RATE: 78 BPM
BARBITURATES UR QL: NEGATIVE
BENZODIAZ UR QL: NEGATIVE
BILIRUB UR QL STRIP: NEGATIVE
CLARITY UR: CLEAR
COCAINE UR QL: NEGATIVE
COLOR UR: YELLOW
GLUCOSE UR STRIP-MCNC: NEGATIVE MG/DL
HGB UR QL STRIP.AUTO: NEGATIVE
KETONES UR STRIP-MCNC: NEGATIVE MG/DL
LEUKOCYTE ESTERASE UR QL STRIP: NEGATIVE
METHADONE UR QL: NEGATIVE
NITRITE UR QL STRIP: NEGATIVE
OPIATES UR QL SCN: NEGATIVE
OXYCODONE+OXYMORPHONE UR QL SCN: NEGATIVE
P AXIS: 54 DEGREES
PCP UR QL: NEGATIVE
PH UR STRIP.AUTO: 7 [PH]
PR INTERVAL: 196 MS
PROT UR STRIP-MCNC: NEGATIVE MG/DL
QRS AXIS: 11 DEGREES
QRSD INTERVAL: 70 MS
QT INTERVAL: 398 MS
QTC INTERVAL: 453 MS
SARS-COV-2 RNA RESP QL NAA+PROBE: NEGATIVE
SP GR UR STRIP.AUTO: 1.01 (ref 1–1.03)
T WAVE AXIS: 25 DEGREES
THC UR QL: NEGATIVE
UROBILINOGEN UR QL STRIP.AUTO: 0.2 E.U./DL
VENTRICULAR RATE: 78 BPM

## 2022-11-01 RX ORDER — ACETAMINOPHEN 325 MG/1
650 TABLET ORAL EVERY 4 HOURS PRN
Status: CANCELLED | OUTPATIENT
Start: 2022-11-01

## 2022-11-01 RX ORDER — ARIPIPRAZOLE 5 MG/1
5 TABLET ORAL DAILY
Status: DISCONTINUED | OUTPATIENT
Start: 2022-11-01 | End: 2022-11-02 | Stop reason: HOSPADM

## 2022-11-01 RX ORDER — OLANZAPINE 2.5 MG/1
2.5 TABLET ORAL
Status: CANCELLED | OUTPATIENT
Start: 2022-11-01

## 2022-11-01 RX ORDER — PANTOPRAZOLE SODIUM 40 MG/1
40 TABLET, DELAYED RELEASE ORAL DAILY
Status: CANCELLED | OUTPATIENT
Start: 2022-11-02

## 2022-11-01 RX ORDER — LORAZEPAM 2 MG/ML
1 INJECTION INTRAMUSCULAR
Status: CANCELLED | OUTPATIENT
Start: 2022-11-01

## 2022-11-01 RX ORDER — ACETAMINOPHEN 325 MG/1
975 TABLET ORAL EVERY 6 HOURS PRN
Status: CANCELLED | OUTPATIENT
Start: 2022-11-01

## 2022-11-01 RX ORDER — GABAPENTIN 400 MG/1
800 CAPSULE ORAL 3 TIMES DAILY
Status: CANCELLED | OUTPATIENT
Start: 2022-11-01

## 2022-11-01 RX ORDER — OLANZAPINE 2.5 MG/1
5 TABLET ORAL
Status: CANCELLED | OUTPATIENT
Start: 2022-11-01

## 2022-11-01 RX ORDER — ARIPIPRAZOLE 5 MG/1
5 TABLET ORAL DAILY
Status: CANCELLED | OUTPATIENT
Start: 2022-11-02

## 2022-11-01 RX ORDER — OLANZAPINE 10 MG/1
5 INJECTION, POWDER, LYOPHILIZED, FOR SOLUTION INTRAMUSCULAR
Status: CANCELLED | OUTPATIENT
Start: 2022-11-01

## 2022-11-01 RX ORDER — HYDROXYZINE HYDROCHLORIDE 25 MG/1
25 TABLET, FILM COATED ORAL
Status: CANCELLED | OUTPATIENT
Start: 2022-11-01

## 2022-11-01 RX ORDER — BENZTROPINE MESYLATE 1 MG/1
1 TABLET ORAL DAILY
Status: DISCONTINUED | OUTPATIENT
Start: 2022-11-01 | End: 2022-11-02 | Stop reason: HOSPADM

## 2022-11-01 RX ORDER — LEVOTHYROXINE SODIUM 0.03 MG/1
25 TABLET ORAL
Status: CANCELLED | OUTPATIENT
Start: 2022-11-02

## 2022-11-01 RX ORDER — ATORVASTATIN CALCIUM 20 MG/1
20 TABLET, FILM COATED ORAL
Status: CANCELLED | OUTPATIENT
Start: 2022-11-01

## 2022-11-01 RX ORDER — DULOXETIN HYDROCHLORIDE 30 MG/1
30 CAPSULE, DELAYED RELEASE ORAL DAILY
Status: CANCELLED | OUTPATIENT
Start: 2022-11-02

## 2022-11-01 RX ORDER — ATORVASTATIN CALCIUM 20 MG/1
20 TABLET, FILM COATED ORAL
Status: DISCONTINUED | OUTPATIENT
Start: 2022-11-01 | End: 2022-11-02 | Stop reason: HOSPADM

## 2022-11-01 RX ORDER — LEVOTHYROXINE SODIUM 0.03 MG/1
25 TABLET ORAL
Status: DISCONTINUED | OUTPATIENT
Start: 2022-11-01 | End: 2022-11-02 | Stop reason: HOSPADM

## 2022-11-01 RX ORDER — BENZTROPINE MESYLATE 1 MG/1
1 TABLET ORAL DAILY
Status: CANCELLED | OUTPATIENT
Start: 2022-11-02

## 2022-11-01 RX ORDER — GABAPENTIN 400 MG/1
800 CAPSULE ORAL 3 TIMES DAILY
Status: DISCONTINUED | OUTPATIENT
Start: 2022-11-01 | End: 2022-11-02 | Stop reason: HOSPADM

## 2022-11-01 RX ORDER — INSULIN GLARGINE 100 [IU]/ML
30 INJECTION, SOLUTION SUBCUTANEOUS
Status: CANCELLED | OUTPATIENT
Start: 2022-11-01

## 2022-11-01 RX ORDER — DULOXETIN HYDROCHLORIDE 30 MG/1
30 CAPSULE, DELAYED RELEASE ORAL DAILY
Status: DISCONTINUED | OUTPATIENT
Start: 2022-11-01 | End: 2022-11-02 | Stop reason: HOSPADM

## 2022-11-01 RX ORDER — LORAZEPAM 0.5 MG/1
0.5 TABLET ORAL
Status: CANCELLED | OUTPATIENT
Start: 2022-11-01

## 2022-11-01 RX ORDER — LORAZEPAM 1 MG/1
1 TABLET ORAL
Status: CANCELLED | OUTPATIENT
Start: 2022-11-01

## 2022-11-01 RX ORDER — IPRATROPIUM BROMIDE AND ALBUTEROL SULFATE 2.5; .5 MG/3ML; MG/3ML
3 SOLUTION RESPIRATORY (INHALATION)
Status: CANCELLED | OUTPATIENT
Start: 2022-11-01

## 2022-11-01 RX ORDER — PANTOPRAZOLE SODIUM 40 MG/1
40 TABLET, DELAYED RELEASE ORAL DAILY
Status: DISCONTINUED | OUTPATIENT
Start: 2022-11-01 | End: 2022-11-02 | Stop reason: HOSPADM

## 2022-11-01 RX ORDER — INSULIN GLARGINE 100 [IU]/ML
30 INJECTION, SOLUTION SUBCUTANEOUS
Status: DISCONTINUED | OUTPATIENT
Start: 2022-11-01 | End: 2022-11-02 | Stop reason: HOSPADM

## 2022-11-01 RX ORDER — TRAZODONE HYDROCHLORIDE 50 MG/1
50 TABLET ORAL
Status: CANCELLED | OUTPATIENT
Start: 2022-11-01

## 2022-11-01 RX ORDER — AMOXICILLIN 250 MG
1 CAPSULE ORAL DAILY PRN
Status: CANCELLED | OUTPATIENT
Start: 2022-11-01

## 2022-11-01 RX ADMIN — GABAPENTIN 800 MG: 400 CAPSULE ORAL at 22:15

## 2022-11-01 RX ADMIN — METFORMIN HYDROCHLORIDE 1000 MG: 500 TABLET ORAL at 16:37

## 2022-11-01 RX ADMIN — BENZTROPINE MESYLATE 1 MG: 1 TABLET ORAL at 08:41

## 2022-11-01 RX ADMIN — IPRATROPIUM BROMIDE AND ALBUTEROL SULFATE 3 ML: 2.5; .5 SOLUTION RESPIRATORY (INHALATION) at 20:05

## 2022-11-01 RX ADMIN — DULOXETINE HYDROCHLORIDE 30 MG: 30 CAPSULE, DELAYED RELEASE ORAL at 08:41

## 2022-11-01 RX ADMIN — ARIPIPRAZOLE 5 MG: 5 TABLET ORAL at 08:41

## 2022-11-01 RX ADMIN — METFORMIN HYDROCHLORIDE 1000 MG: 500 TABLET ORAL at 06:41

## 2022-11-01 RX ADMIN — LEVOTHYROXINE SODIUM 25 MCG: 25 TABLET ORAL at 06:41

## 2022-11-01 RX ADMIN — PANTOPRAZOLE SODIUM 40 MG: 40 TABLET, DELAYED RELEASE ORAL at 08:41

## 2022-11-01 RX ADMIN — INSULIN GLARGINE 30 UNITS: 100 INJECTION, SOLUTION SUBCUTANEOUS at 22:15

## 2022-11-01 RX ADMIN — ATORVASTATIN CALCIUM 20 MG: 20 TABLET, FILM COATED ORAL at 16:39

## 2022-11-01 RX ADMIN — GABAPENTIN 800 MG: 400 CAPSULE ORAL at 16:37

## 2022-11-01 RX ADMIN — GABAPENTIN 800 MG: 400 CAPSULE ORAL at 08:41

## 2022-11-01 NOTE — ED NOTES
Transportation is arranged with [facility]  Transportation is scheduled for 0900  Patient may go to the floor at 1000 or after 11/2/22    Nurse report is to be called to [phone] prior to patient transfer

## 2022-11-01 NOTE — ED NOTES
Insurance Authorization for admission:  Phone call placed to [employer] number [id number]  Spoke to [place]  8 days approved     Level of care inpatient  Review on 11/8 with Leelee Escudero [phone] [id number]  Authorization # [id number]
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
## 2022-11-01 NOTE — ED CARE HANDOFF
Emergency Department Sign Out Note        Sign out and transfer of care from  Dr Charly Cazares See Separate Emergency Department note  The patient, Irvin Head, was evaluated by the previous provider for suicidal attempt  Workup Completed:  Labs Reviewed   CBC AND DIFFERENTIAL - Abnormal       Result Value Ref Range Status    WBC 14 76 (*) 4 31 - 10 16 Thousand/uL Final    RBC 4 74  3 81 - 5 12 Million/uL Final    Hemoglobin 14 0  11 5 - 15 4 g/dL Final    Hematocrit 41 5  34 8 - 46 1 % Final    MCV 88  82 - 98 fL Final    MCH 29 5  26 8 - 34 3 pg Final    MCHC 33 7  31 4 - 37 4 g/dL Final    RDW 13 2  11 6 - 15 1 % Final    MPV 9 8  8 9 - 12 7 fL Final    Platelets 524  428 - 390 Thousands/uL Final    nRBC 0  /100 WBCs Final    Neutrophils Relative 63  43 - 75 % Final    Immat GRANS % 0  0 - 2 % Final    Lymphocytes Relative 28  14 - 44 % Final    Monocytes Relative 8  4 - 12 % Final    Eosinophils Relative 1  0 - 6 % Final    Basophils Relative 0  0 - 1 % Final    Neutrophils Absolute 9 22 (*) 1 85 - 7 62 Thousands/µL Final    Immature Grans Absolute 0 06  0 00 - 0 20 Thousand/uL Final    Lymphocytes Absolute 4 17  0 60 - 4 47 Thousands/µL Final    Monocytes Absolute 1 14  0 17 - 1 22 Thousand/µL Final    Eosinophils Absolute 0 11  0 00 - 0 61 Thousand/µL Final    Basophils Absolute 0 06  0 00 - 0 10 Thousands/µL Final   COMPREHENSIVE METABOLIC PANEL - Abnormal    Sodium 135  135 - 147 mmol/L Final    Potassium 3 9  3 5 - 5 3 mmol/L Final    Chloride 99  96 - 108 mmol/L Final    CO2 26  21 - 32 mmol/L Final    ANION GAP 10  4 - 13 mmol/L Final    BUN 7  5 - 25 mg/dL Final    Creatinine 0 74  0 60 - 1 30 mg/dL Final    Comment: Standardized to IDMS reference method    Glucose 229 (*) 65 - 140 mg/dL Final    Comment: If the patient is fasting, the ADA then defines impaired fasting glucose as > 100 mg/dL and diabetes as > or equal to 123 mg/dL    Specimen collection should occur prior to Sulfasalazine administration due to the potential for falsely depressed results  Specimen collection should occur prior to Sulfapyridine administration due to the potential for falsely elevated results  Calcium 9 6  8 4 - 10 2 mg/dL Final    AST 18  13 - 39 U/L Final    Comment: Specimen collection should occur prior to Sulfasalazine administration due to the potential for falsely depressed results  ALT 30  7 - 52 U/L Final    Comment: Specimen collection should occur prior to Sulfasalazine administration due to the potential for falsely depressed results  Alkaline Phosphatase 93  34 - 104 U/L Final    Total Protein 7 3  6 4 - 8 4 g/dL Final    Albumin 4 2  3 5 - 5 0 g/dL Final    Total Bilirubin 0 53  0 20 - 1 00 mg/dL Final    eGFR 91  ml/min/1 73sq m Final    Narrative:     [place] guidelines for Chronic Kidney Disease (CKD):   •  Stage 1 with normal or high GFR (GFR > 90 mL/min/1 73 square meters)  •  Stage 2 Mild CKD (GFR = 60-89 mL/min/1 73 square meters)  •  Stage 3A Moderate CKD (GFR = 45-59 mL/min/1 73 square meters)  •  Stage 3B Moderate CKD (GFR = 30-44 mL/min/1 73 square meters)  •  Stage 4 Severe CKD (GFR = 15-29 mL/min/1 73 square meters)  •  Stage 5 End Stage CKD (GFR <15 mL/min/1 73 square meters)  Note: GFR calculation is accurate only with a steady state creatinine   TSH, 3RD GENERATION - Abnormal    TSH [address] [place] [address] 6 069 (*) 0 450 - 4 500 uIU/mL Final    Comment: The recommended reference ranges for TSH during pregnancy are as follows:   First trimester 0 1 to 2 5 uIU/mL   Second trimester  0 2 to 3 0 uIU/mL   Third trimester 0 3 to 3 0 uIU/m    Note: Normal ranges may not apply to patients who are transgender, non-binary, or whose legal sex, sex at birth, and gender identity differ  Adult TSH (3rd generation) reference range follows the recommended guidelines of the American Thyroid Association, January, 2020      Narrative:     Patients undergoing fluorescein dye angiography may retain small amounts of fluorescein in the body for 48-72 hours post procedure  Samples containing fluorescein can produce falsely depressed TSH values  If the patient had this procedure,a specimen should be resubmitted post fluorescein clearance  ACETAMINOPHEN LEVEL - Abnormal    Acetaminophen Level <10 (*) 10 - 20 ug/mL Final   POCT GLUCOSE - Abnormal    POC Glucose 213 (*) 65 - 140 mg/dl Final   NOVEL CORONAVIRUS (COVID-19), PCR [facility] - Normal    SARS-CoV-2 Negative  Negative Final    Narrative:     FOR PEDIATRIC PATIENTS - copy/paste COVID Guidelines URL to browser: [email or web address]  [employer]    SARS-CoV-2 assay is a Nucleic Acid Amplification assay intended for the  qualitative detection of nucleic acid from SARS-CoV-2 in nasopharyngeal  swabs  Results are for the presumptive identification of SARS-CoV-2 RNA  Positive results are indicative of infection with SARS-CoV-2, the virus  causing COVID-19, but do not rule out bacterial infection or co-infection  with other viruses  Laboratories within the [place] and its  territories are required to report all positive results to the appropriate  public health authorities  Negative results do not preclude SARS-CoV-2  infection and should not be used as the sole basis for treatment or other  patient management decisions  Negative results must be combined with  clinical observations, patient history, and epidemiological information  This test has not been FDA cleared or approved  This test has been authorized by FDA under an Emergency Use Authorization  (EUA)  This test is only authorized for the duration of time the  declaration that circumstances exist justifying the authorization of the  emergency use of an in vitro diagnostic tests for detection of SARS-CoV-2  virus and/or diagnosis of COVID-19 infection under section 564(b)(1) of  the Act, 21 U  S C  055QCN-9(J)(7), unless the authorization is terminated  or revoked sooner  The test has been validated but independent review by FDA  and CLIA is pending  Test performed using [employer] Gene[employer]pert: This RT-PCR assay targets N2,  a region unique to SARS-CoV-2  A conserved region in the E-gene was chosen  for pan-Sarbecovirus detection which includes SARS-CoV-2  According to CMS-2020-01-R, this platform meets the definition of high-throughput technology  RAPID DRUG SCREEN, URINE - Normal    Amph/Meth UR Negative  Negative Final    Barbiturate Ur Negative  Negative Final    Benzodiazepine Urine Negative  Negative Final    Cocaine Urine Negative  Negative Final    Methadone Urine Negative  Negative Final    Opiate Urine Negative  Negative Final    PCP Ur Negative  Negative Final    THC Urine Negative  Negative Final    Oxycodone Urine Negative  Negative Final    Narrative:     FOR MEDICAL PURPOSES ONLY  IF CONFIRMATION NEEDED PLEASE CONTACT THE LAB WITHIN 5 DAYS      Drug Screen Cutoff Levels:  AMPHETAMINE/METHAMPHETAMINES  1000 ng/mL  BARBITURATES     200 ng/mL  BENZODIAZEPINES     200 ng/mL  COCAINE      300 ng/mL  METHADONE      300 ng/mL  OPIATES      300 ng/mL  PHENCYCLIDINE     25 ng/mL  THC       50 ng/mL  OXYCODONE      100 ng/mL   MEDICAL ALCOHOL - Normal    Ethanol Lvl <10  <10 mg/dL Final   UA W REFLEX TO MICROSCOPIC WITH REFLEX TO CULTURE - Normal    Color, UA Yellow  Yellow Final    Clarity, UA Clear  Clear Final    Specific Gravity, UA 1 015  1 001 - 1 030 Final    pH, UA 7 0  5 0, 5 5, 6 0, 6 5, 7 0, 7 5, 8 0 Final    Leukocytes, UA Negative  Negative Final    Nitrite, UA Negative  Negative Final    Protein, UA Negative  Negative, Interference- unable to analyze mg/dl Final    Glucose, UA Negative  Negative mg/dl Final    Ketones, UA Negative  Negative mg/dl Final    Urobilinogen, UA 0 2  0 2, 1 0 E U /dl E U /dl Final    Bilirubin, UA Negative  Negative Final    Occult Blood, UA Negative  Negative Final   SALICYLATE LEVEL - Normal    Salicylate Lvl <5  3 - 20 mg/dL Final   POCT ALCOHOL BREATH TEST - Normal    EXTBreath Alcohol 0 000   Final         ED Course / Workup Pending (followup): This is 57y came by EMS for suicidal attempt  By overdosing   Pt claimed she took metoprolol 100mg tab  Abut 1/4 of bottle which contain 180 tab  Also handful of HTCZ,  12 5mg, and also gabapentin   Pt stated she feel depressed and this is going on she lost beloved one  Pt does not take her meds as she is diabetic and has h/o of DM, Hypothyroidism   Pt want to sign herself to psych facility   Pt evaluated by crisis  Procedures  MDM        Disposition  Final diagnoses:   Intentional drug overdose, initial encounter ([place] [address] )   Suicide attempt [facility])     Time reflects when diagnosis was documented in both MDM as applicable and the Disposition within this note     Time User Action Codes Description Comment    11/1/2022  2:23 AM Amanda Blood Add Lisa Mendez Intentional drug overdose, initial encounter ([facility] )     11/1/2022  2:23 AM Amanda Blood Add Ples Gucci Suicide attempt [facility])       ED Disposition     ED Disposition   Transfer to [address] [place] [address]   --    Date/Time   Tue Nov 1, 2022  2:23 AM    Comment   Bev Chowdary should be transferred out to pending facility and has been medically cleared  Follow-up Information    None       Patient's Medications   Discharge Prescriptions    No medications on file     No discharge procedures on file         ED Provider  Electronically Signed by     Lauren Soriano MD  11/02/22 5930

## 2022-11-01 NOTE — ED PROVIDER NOTES
History  Chief Complaint   Patient presents with   • Overdose - Intentional     Brought in by EMS, pt took approx 1/4 bottle of her prescribed 100mg metoprolol, and handful of  12 5 mg hydrochlorothiazide, 4 800mg gabapentin tabs, pt states she took it to hurt herself     Patient presents to emergency department by EMS due to drug overdose and a suicide attempt  Patient admits to being depressed for the past 5 months since the death of a loved 1  She reports that she had a bottle of metoprolol that was approximately 25% full of metoprolol tartrate tablets 100 mg (180 tablets per full bottle)   She also reports taking a handful hydrochlorothiazide 12 5 mg tablets, and for gabapentin 800 mg tablets  She denies any other coingestion including alcohol, aspirin, Tylenol  She reports she still feels depressed and she tells me she does note that she needs to be admitted psychiatrically  Depression is severe and constant  He has been increasing for the past 5 months  No exacerbating or alleviating factors  Patient denies feeling drowsy  She denies nausea vomiting  She denies any abdominal pain or any pain of any kind  She physically feels well apart from feeling hopeless and sad  Prior to Admission Medications   Prescriptions Last Dose Informant Patient Reported? Taking? ARIPiprazole (ABILIFY) 5 mg tablet   No No   Sig: Take 1 tablet (5 mg total) by mouth daily   Alcohol Swabs 70 % PADS   No No   Sig: May substitute brand based on insurance coverage  Check glucose TID  Blood Glucose Monitoring Suppl (OneTouch Verio Reflect) w/Device KIT   No No   Sig: May substitute brand based on insurance coverage  Check glucose TID     DULoxetine (CYMBALTA) 30 mg delayed release capsule   No No   Sig: Take 1 capsule (30 mg total) by mouth daily   Microlet Lancets MISC  Self Yes No   Patient not taking: Reported on 2/52/1653   OneTouch Delica Lancets 16S MISC   No No   Sig: May substitute brand based on insurance coverage  Check glucose TID  atorvastatin (LIPITOR) 20 mg tablet   No No   Sig: Take 1 tablet (20 mg total) by mouth daily   benzonatate (TESSALON PERLES) 100 mg capsule   No No   Sig: Take 1 capsule (100 mg total) by mouth 3 (three) times a day as needed for cough   benztropine (COGENTIN) 1 mg tablet   Yes No   Sig: Take 1 mg by mouth daily   buPROPion (WELLBUTRIN SR) 150 mg 12 hr tablet  Self Yes No   Si mg daily     Patient not taking: Reported on 2022   cloNIDine (CATAPRES) 0 1 mg tablet  Self No No   Sig: Take 1 tablet (0 1 mg total) by mouth daily at bedtime   Patient not taking: Reported on 2022   clonazePAM (KlonoPIN) 0 5 mg tablet   No No   Sig: Take 1 tablet (0 5 mg total) by mouth 2 (two) times a day as needed for anxiety   ergocalciferol (VITAMIN D2) 50,000 units   No No   Sig: TAKE 1 CAPSULE BY MOUTH 1 TIME A WEEK   fluticasone-vilanterol (Breo Ellipta) 100-25 mcg/inh inhaler   No No   Sig: Inhale 1 puff daily Rinse mouth after use  folic acid (FOLVITE) 1 mg tablet   Yes No   Sig: Take by mouth daily   gabapentin (NEURONTIN) 800 mg tablet   No No   Sig: Take 1 tablet (800 mg total) by mouth 3 (three) times a day   glucose blood (OneTouch Verio) test strip   No No   Sig: May substitute brand based on insurance coverage  Check glucose TID     glucose blood test strip  Self No No   Sig: Check sugars 5 times a week   hydrOXYzine HCL (ATARAX) 50 mg tablet   No No   Sig: TAKE 1 TABLET(50 MG) BY MOUTH TWICE DAILY AS NEEDED FOR ANXIETY   hydrochlorothiazide (HYDRODIURIL) 12 5 mg tablet   No No   Sig: TAKE 1 TABLET(12 5 MG) BY MOUTH DAILY   insulin glargine (LANTUS) 100 units/mL subcutaneous injection   No No   Sig: Inject 30 Units under the skin daily at bedtime   levothyroxine 25 mcg tablet  Self No No   Sig: Take 1 tablet (25 mcg total) by mouth daily in the early morning   metFORMIN (GLUCOPHAGE) 1000 MG tablet  Self No No   Sig: TAKE 1 TABLET(1000 MG) BY MOUTH TWICE DAILY WITH MEALS metoprolol tartrate (LOPRESSOR) 100 mg tablet   No No   Sig: Take 1 tablet (100 mg total) by mouth every 12 (twelve) hours   pantoprazole (PROTONIX) 40 mg tablet   No No   Sig: Take 1 tablet (40 mg total) by mouth daily   traZODone (DESYREL) 100 mg tablet   No No   Sig: Take 2 tablets (200 mg total) by mouth daily at bedtime      Facility-Administered Medications: None       Past Medical History:   Diagnosis Date   • Addiction to drug [facility])    • Adjustment disorder    • Alcohol abuse    • Alcoholism ([facility] )    • Anxiety    • Bipolar disorder ([facility] )    • Bowel obstruction ([facility] )    • Depression    • Diabetes type 2, controlled ([place] [address] )    • Disease of thyroid gland    • Gastritis    • Head injury    • Heart palpitations    • Hyperlipidemia    • Hypertension    • Hypothyroidism    • Psychiatric illness    • PTSD (post-traumatic stress disorder)    • Seizure ([facility] )    • Seizures ([place] [address] )    • Sleep difficulties    • Substance abuse ([place] [address] )    • Suicide attempt ([place] [address] )    • Withdrawal symptoms, drug or narcotic ([place] [address] )        Past Surgical History:   Procedure Laterality Date   • ABDOMINAL SURGERY     • APPENDECTOMY     • BOWEL RESECTION     • CHOLECYSTECTOMY     • ESOPHAGOGASTRODUODENOSCOPY N/A 5/18/2018    Procedure: ESOPHAGOGASTRODUODENOSCOPY (EGD) with bx;  Surgeon: Dahiana Cruz DO;  Location: [facility]; Service: Gastroenterology   • HYSTERECTOMY     • KNEE SURGERY Bilateral        Family History   Problem Relation Age of Onset   • Diabetes Father    • Bipolar disorder Mother      I have reviewed and agree with the history as documented      E-Cigarette/Vaping   • E-Cigarette Use Never User      E-Cigarette/Vaping Substances   • Nicotine Yes    • THC No    • CBD No    • Flavoring No    • Other No    • Unknown No      Social History     Tobacco Use   • Smoking status: Current Every Day Smoker     Packs/day: 0 50     Years: 25 00     Pack years: 12 50     Types: Cigarettes   • Smokeless tobacco: Never Used   • Tobacco comment: smokes like 5 a day(06/24/2022)   Vaping Use   • Vaping Use: Never used   Substance Use Topics   • Alcohol use: Not Currently     Alcohol/week: 6 0 standard drinks     Types: 6 Cans of beer per week     Comment: last drink on 08/15/20   • Drug use: No       Review of Systems   All other systems reviewed and are negative  Physical Exam  Physical Exam  Vitals and nursing note reviewed  Constitutional:       General: She is not in acute distress  Appearance: She is well-developed  HENT:      Head: Normocephalic and atraumatic  Eyes:      Conjunctiva/sclera: Conjunctivae normal    Cardiovascular:      Rate and Rhythm: Normal rate and regular rhythm  Heart sounds: No murmur heard  Comments: Heart rate 80  Pulmonary:      Effort: Pulmonary effort is normal  No respiratory distress  Breath sounds: Normal breath sounds  Abdominal:      Palpations: Abdomen is soft  Tenderness: There is no abdominal tenderness  Musculoskeletal:      Cervical back: Neck supple  Skin:     General: Skin is warm and dry  Neurological:      General: No focal deficit present  Mental Status: She is alert     Psychiatric:         Mood and Affect: Mood normal          Vital Signs  ED Triage Vitals   Temperature Pulse Respirations Blood Pressure SpO2   10/31/22 2044 10/31/22 2019 10/31/22 2019 10/31/22 2019 10/31/22 2019   98 8 °F (37 1 °C) 90 18 (!) 173/89 95 %      Temp Source Heart Rate Source Patient Position - Orthostatic VS BP Location FiO2 (%)   10/31/22 2044 11/01/22 0253 11/01/22 0253 11/01/22 0253 --   Oral Monitor Lying Right arm       Pain Score       --                  Vitals:    10/31/22 2213 10/31/22 2315 10/31/22 2359 11/01/22 0253   BP: 128/78 123/65 128/72 136/71   Pulse: 69 78 65 61   Patient Position - Orthostatic VS:    Lying         Visual Acuity      ED Medications  Medications   charcoal activated (KIRBY INSTA-VALERIA) oral liquid 50 g (50 g Oral Not Given 10/31/22 2050) ipratropium-albuterol (DUO-NEB) 0 5-2 5 mg/3 mL inhalation solution 3 mL (3 mL Nebulization Not Given 11/1/22 0221)   ARIPiprazole (ABILIFY) tablet 5 mg (has no administration in time range)   atorvastatin (LIPITOR) tablet 20 mg (has no administration in time range)   benztropine (COGENTIN) tablet 1 mg (has no administration in time range)   DULoxetine (CYMBALTA) delayed release capsule 30 mg (has no administration in time range)   gabapentin (NEURONTIN) capsule 800 mg (has no administration in time range)   insulin glargine (LANTUS) subcutaneous injection 30 Units 0 3 mL (has no administration in time range)   levothyroxine tablet 25 mcg (25 mcg Oral Given 11/1/22 0641)   metFORMIN (GLUCOPHAGE) tablet 1,000 mg (1,000 mg Oral Given 11/1/22 0641)   pantoprazole (PROTONIX) EC tablet 40 mg (has no administration in time range)   sodium chloride 0 9 % bolus 1,000 mL (0 mL Intravenous Stopped 11/1/22 0246)   clonazePAM (KlonoPIN) tablet 0 5 mg (0 5 mg Oral Given 10/31/22 2315)       Diagnostic Studies  Results Reviewed     Procedure Component Value Units Date/Time    COVID only [[id number]]  (Normal) Collected: 11/01/22 0249    Lab Status: Final result Specimen: Nares from Nose Updated: 11/01/22 0330     SARS-CoV-2 Negative    Narrative:      FOR PEDIATRIC PATIENTS - copy/paste COVID Guidelines URL to browser: [email or web address]    SARS-CoV-2 assay is a Nucleic Acid Amplification assay intended for the  qualitative detection of nucleic acid from SARS-CoV-2 in nasopharyngeal  swabs  Results are for the presumptive identification of SARS-CoV-2 RNA  Positive results are indicative of infection with SARS-CoV-2, the virus  causing COVID-19, but do not rule out bacterial infection or co-infection  with other viruses  Laboratories within the [place] and its  territories are required to report all positive results to the appropriate  public health authorities  Negative results do not preclude SARS-CoV-2  infection and should not be used as the sole basis for treatment or other  patient management decisions  Negative results must be combined with  clinical observations, patient history, and epidemiological information  This test has not been FDA cleared or approved  This test has been authorized by FDA under an Emergency Use Authorization  (EUA)  This test is only authorized for the duration of time the  declaration that circumstances exist justifying the authorization of the  emergency use of an in vitro diagnostic tests for detection of SARS-CoV-2  virus and/or diagnosis of COVID-19 infection under section 564(b)(1) of  the Act, 21 U  S C  460RGK-2(I)(8), unless the authorization is terminated  or revoked sooner  The test has been validated but independent review by FDA  and CLIA is pending  Test performed using [employer] GeneXpert: This RT-PCR assay targets N2,  a region unique to SARS-CoV-2  A conserved region in the E-gene was chosen  for pan-Sarbecovirus detection which includes SARS-CoV-2  According to CMS-2020-01-R, this platform meets the definition of high-throughput technology  Rapid drug screen, urine [[id number]]  (Normal) Collected: 11/01/22 0249    Lab Status: Final result Specimen: Urine, Other Updated: 11/01/22 0317     Amph/Meth UR Negative     Barbiturate Ur Negative     Benzodiazepine Urine Negative     Cocaine Urine Negative     Methadone Urine Negative     Opiate Urine Negative     PCP Ur Negative     THC Urine Negative     Oxycodone Urine Negative    Narrative:      FOR MEDICAL PURPOSES ONLY  IF CONFIRMATION NEEDED PLEASE CONTACT THE LAB WITHIN 5 DAYS      Drug Screen Cutoff Levels:  AMPHETAMINE/METHAMPHETAMINES  1000 ng/mL  BARBITURATES     200 ng/mL  BENZODIAZEPINES     200 ng/mL  COCAINE      300 ng/mL  METHADONE      300 ng/mL  OPIATES      300 ng/mL  PHENCYCLIDINE     25 ng/mL  THC       50 ng/mL  OXYCODONE      100 ng/mL    UA w Reflex to Microscopic w Reflex to Culture [[id number]]  (Normal) Collected: 11/01/22 0250    Lab Status: Final result Specimen: Urine, Other Updated: 11/01/22 0258     Color, UA Yellow     Clarity, UA Clear     Specific Gravity, UA 1 015     pH, UA 7 0     Leukocytes, UA Negative     Nitrite, UA Negative     Protein, UA Negative mg/dl      Glucose, UA Negative mg/dl      Ketones, UA Negative mg/dl      Urobilinogen, UA 0 2 E U /dl      Bilirubin, UA Negative     Occult Blood, UA Negative    TSH [[id number]]  (Abnormal) Collected: 10/31/22 2042    Lab Status: Final result Specimen: Blood from Arm, Right Updated: 10/31/22 2124     TSH 3RD GENERATON 6 069 uIU/mL     Narrative:      Patients undergoing fluorescein dye angiography may retain small amounts of fluorescein in the body for 48-72 hours post procedure  Samples containing fluorescein can produce falsely depressed TSH values  If the patient had this procedure,a specimen should be resubmitted post fluorescein clearance        Salicylate level [[id number]]  (Normal) Collected: 10/31/22 2042    Lab Status: Final result Specimen: Blood from Arm, Right Updated: 41/18/92 1405     Salicylate Lvl <5 mg/dL     Acetaminophen level-"If concentration is detectable, please discuss with medical  on call " [[id number]]  (Abnormal) Collected: 10/31/22 2042    Lab Status: Final result Specimen: Blood from Arm, Right Updated: 10/31/22 2108     Acetaminophen Level <10 ug/mL     Comprehensive metabolic panel [[id number]]  (Abnormal) Collected: 10/31/22 2042    Lab Status: Final result Specimen: Blood from Arm, Right Updated: 10/31/22 2108     Sodium 135 mmol/L      Potassium 3 9 mmol/L      Chloride 99 mmol/L      CO2 26 mmol/L      ANION GAP 10 mmol/L      BUN 7 mg/dL      Creatinine 0 74 mg/dL      Glucose 229 mg/dL      Calcium 9 6 mg/dL      AST 18 U/L      ALT 30 U/L      Alkaline Phosphatase 93 U/L      Total Protein 7 3 g/dL      Albumin 4 2 g/dL      Total Bilirubin 0 53 mg/dL      eGFR 91 ml/min/1 73sq m     Narrative:      National Kidney Disease Foundation guidelines for Chronic Kidney Disease (CKD):   •  Stage 1 with normal or high GFR (GFR > 90 mL/min/1 73 square meters)  •  Stage 2 Mild CKD (GFR = 60-89 mL/min/1 73 square meters)  •  Stage 3A Moderate CKD (GFR = 45-59 mL/min/1 73 square meters)  •  Stage 3B Moderate CKD (GFR = 30-44 mL/min/1 73 square meters)  •  Stage 4 Severe CKD (GFR = 15-29 mL/min/1 73 square meters)  •  Stage 5 End Stage CKD (GFR <15 mL/min/1 73 square meters)  Note: GFR calculation is accurate only with a steady state creatinine    Ethanol [[id number]]  (Normal) Collected: 10/31/22 2042    Lab Status: Final result Specimen: Blood from Arm, Right Updated: 10/31/22 2107     Ethanol Lvl <10 mg/dL     CBC and differential [[id number]]  (Abnormal) Collected: 10/31/22 2042    Lab Status: Final result Specimen: Blood from Arm, Right Updated: 10/31/22 2052     WBC 14 76 Thousand/uL      RBC 4 74 Million/uL      Hemoglobin 14 0 g/dL      Hematocrit 41 5 %      MCV 88 fL      MCH 29 5 pg      MCHC 33 7 g/dL      RDW 13 2 %      MPV 9 8 fL      Platelets 606 Thousands/uL      nRBC 0 /100 WBCs      Neutrophils Relative 63 %      Immat GRANS % 0 %      Lymphocytes Relative 28 %      Monocytes Relative 8 %      Eosinophils Relative 1 %      Basophils Relative 0 %      Neutrophils Absolute 9 22 Thousands/µL      Immature Grans Absolute 0 06 Thousand/uL      Lymphocytes Absolute 4 17 Thousands/µL      Monocytes Absolute 1 14 Thousand/µL      Eosinophils Absolute 0 11 Thousand/µL      Basophils Absolute 0 06 Thousands/µL     Fingerstick Glucose (POCT) [[id number]]  (Abnormal) Collected: 10/31/22 2049    Lab Status: Final result Updated: 10/31/22 2051     POC Glucose 213 mg/dl     POCT alcohol breath test [[id number]]  (Normal) Resulted: 10/31/22 2045    Lab Status: Final result Updated: 10/31/22 2045     EXTBreath Alcohol 0 000                 No orders to display Procedures  CriticalCare Time  Performed by: Sergey Jackson MD  Authorized by: Sergey Jackson MD     Critical care provider statement:     Critical care time (minutes):  45    Critical care was necessary to treat or prevent imminent or life-threatening deterioration of the following conditions:  Cardiac failure and toxidrome    Critical care was time spent personally by me on the following activities:  Obtaining history from patient or surrogate, development of treatment plan with patient or surrogate, discussions with consultants, evaluation of patient's response to treatment, examination of patient, interpretation of cardiac output measurements, ordering and performing treatments and interventions, ordering and review of laboratory studies and re-evaluation of patient's condition             ED Course  ED Course as of 11/01/22 0711   Mon Oct 31, 2022   2041 EKG: SR at 66 with late transition, , ST-t normal, Qtc 453   2105 Patient is currently asymptomatic  I evaluated the patient and ordered labs, EKG, and consulted with toxicology  Tox advised ED observation until 2am and admission if patient has concerning electrolyte disturbances, and admission to ICU with pressors if patient develops bradycardia or hypotension  2200 I performed serial examinations of the patient to watch for developing hypotension, bradycardia, or depression of mental status  2222 Patient's pulse ox decreased to 89% when she was sleeping  I went to the room and talked to the patient  She was very easily aroused  She reports she does smoke a pack a day  I do notice some faint wheezing  I ordered a DuoNeb  Patient is not at all somnolent so I suspect low pulse ox is due to her heavy smoking and wheezing  HR 67 on my repeat examination  2253 Patient is feeling very anxious  She usually takes klonopin  Will prescribe home medication  Patient has remained asymptomatic from ingestion    HR 73 on re-evaluation  2300 I continued to perform serial examinations of the patient to watch for developing hypotension, bradycardia, or depression of mental status  Patient remains normotensive at this time without bradycardia or AMS  Tue Nov 01, 2022   0117 Two doctor 36 signed due to suicide attempt and need for psychiatric hospitalization  0221 Patient remains asymptomatic without bradycardia or hypotension  Patient is medically cleared for psychiatric evaluation and/or admission  [id number] Case signed out to oncoming provider at change of shift pending crisis evaluation for placement  SBIRT 20yo+    Flowsheet Row Most Recent Value   SBIRT (23 yo +)    In order to provide better care to our patients, we are screening all of our patients for alcohol and drug use  Would it be okay to ask you these screening questions? Yes Filed at: 10/31/2022 2024   Initial Alcohol Screen: US AUDIT-C     1  How often do you have a drink containing alcohol? 0 Filed at: 10/31/2022 2024   2  How many drinks containing alcohol do you have on a typical day you are drinking? 0 Filed at: 10/31/2022 2024   3b  FEMALE Any Age, or MALE 65+: How often do you have 4 or more drinks on one occassion? 0 Filed at: 10/31/2022 2024   Audit-C Score 0 Filed at: 10/31/2022 2024   MUMTAZ: How many times in the past year have you    Used an illegal drug or used a prescription medication for non-medical reasons? Never Filed at: 10/31/2022 2024                    MDM  Number of Diagnoses or Management Options  Intentional drug overdose, initial encounter [facility])  Suicide attempt [facility])  Diagnosis management comments: I evaluate the patient  She reported a very concerning beta-blocker overdose with potential for lethality  As result of this, I immediately had patient placed on monitor and contacted  on-call    Patient required my constant visual inattention after presentation to watch for developing symptomatic bradycardia or hypotension  Amount and/or Complexity of Data Reviewed  Clinical lab tests: ordered and reviewed        Disposition  Final diagnoses:   Intentional drug overdose[facility] )   Suicide attempt [facility])     Time reflects when diagnosis was documented in both MDM as applicable and the Disposition within this note     Time User Action Codes Description Comment    11/1/2022  2:23 AM Mitch Lemus Intentional drug overdose, initial encounter ([facility] )     11/1/2022  2:23 AM Mitch eMdley Suicide attempt [facility])       ED Disposition     ED Disposition   Transfer to [address] [place] [address]   --    Date/Time   Tue Nov 1, 2022  2:23 AM    Comment   Mauro Points should be transferred out to pending facility and has been medically cleared  Follow-up Information    None         Patient's Medications   Discharge Prescriptions    No medications on file       No discharge procedures on file      PDMP Review       Value Time User    PDMP Reviewed  Yes 7/29/2022 11:06 AM Brian Martinez MD          ED Provider  Electronically Signed by           Luis Carlos Martinez MD  11/01/22 9821

## 2022-11-01 NOTE — ED NOTES
Per Behavioral Health intake, the patient to be sent for review at [facility]

## 2022-11-01 NOTE — ED NOTES
Patient is accepted at [facility]  Patient is accepted by Dr Kalina Celaya per Milka Herrera, Intake     Intake to notify crisis of when patient can arrive to [facility]      Nurse report is to be called to [phone] prior to patient transfer

## 2022-11-02 ENCOUNTER — HOSPITAL ENCOUNTER (INPATIENT)
Facility: HOSPITAL | Age: 57
LOS: 6 days | Discharge: HOME | End: 2022-11-08
Attending: STUDENT IN AN ORGANIZED HEALTH CARE EDUCATION/TRAINING PROGRAM | Admitting: STUDENT IN AN ORGANIZED HEALTH CARE EDUCATION/TRAINING PROGRAM

## 2022-11-02 VITALS
WEIGHT: 188 LBS | HEIGHT: 63 IN | OXYGEN SATURATION: 95 % | TEMPERATURE: 98.6 F | RESPIRATION RATE: 18 BRPM | SYSTOLIC BLOOD PRESSURE: 121 MMHG | DIASTOLIC BLOOD PRESSURE: 63 MMHG | BODY MASS INDEX: 33.31 KG/M2 | HEART RATE: 84 BPM

## 2022-11-02 DIAGNOSIS — E66.9 DIABETES MELLITUS TYPE 2 IN OBESE (HCC): ICD-10-CM

## 2022-11-02 DIAGNOSIS — E11.69 DIABETES MELLITUS TYPE 2 IN OBESE (HCC): ICD-10-CM

## 2022-11-02 DIAGNOSIS — I10 PRIMARY HYPERTENSION: ICD-10-CM

## 2022-11-02 DIAGNOSIS — F17.210 CIGARETTE SMOKER: Primary | ICD-10-CM

## 2022-11-02 DIAGNOSIS — T50.902A INTENTIONAL DRUG OVERDOSE, INITIAL ENCOUNTER (HCC): ICD-10-CM

## 2022-11-02 DIAGNOSIS — F31.4 BIPOLAR DISORDER, CURRENT EPISODE DEPRESSED, SEVERE, WITHOUT PSYCHOTIC FEATURES (HCC): ICD-10-CM

## 2022-11-02 LAB
25(OH)D3 SERPL-MCNC: 41.6 NG/ML (ref 30–100)
ATRIAL RATE: 0 BPM
ATRIAL RATE: 75 BPM
ATRIAL RATE: 75 BPM
CHOLEST SERPL-MCNC: 162 MG/DL
EST. AVERAGE GLUCOSE BLD GHB EST-MCNC: 151 MG/DL
GLUCOSE SERPL-MCNC: 105 MG/DL (ref 65–140)
GLUCOSE SERPL-MCNC: 153 MG/DL (ref 65–140)
HBA1C MFR BLD: 6.9 %
HDLC SERPL-MCNC: 40 MG/DL
LDLC SERPL CALC-MCNC: 79 MG/DL
NONHDLC SERPL-MCNC: 122 MG/DL
P AXIS: 54 DEGREES
P AXIS: 54 DEGREES
PR INTERVAL: 190 MS
PR INTERVAL: 190 MS
QRS AXIS: 0 DEGREES
QRS AXIS: 17 DEGREES
QRS AXIS: 17 DEGREES
QRSD INTERVAL: 0 MS
QRSD INTERVAL: 70 MS
QRSD INTERVAL: 70 MS
QT INTERVAL: 0 MS
QT INTERVAL: 440 MS
QT INTERVAL: 440 MS
QTC INTERVAL: 0 MS
QTC INTERVAL: 491 MS
QTC INTERVAL: 491 MS
SARS-COV-2 RNA RESP QL NAA+PROBE: NEGATIVE
T WAVE AXIS: 0 DEGREES
T WAVE AXIS: 26 DEGREES
T WAVE AXIS: 26 DEGREES
TRIGL SERPL-MCNC: 216 MG/DL
TSH SERPL DL<=0.05 MIU/L-ACNC: 2.31 UIU/ML (ref 0.45–4.5)
VENTRICULAR RATE: 0 BPM
VENTRICULAR RATE: 75 BPM
VENTRICULAR RATE: 75 BPM
VIT B12 SERPL-MCNC: 545 PG/ML (ref 100–900)

## 2022-11-02 RX ORDER — TRAZODONE HYDROCHLORIDE 50 MG/1
50 TABLET ORAL
Status: DISCONTINUED | OUTPATIENT
Start: 2022-11-02 | End: 2022-11-03

## 2022-11-02 RX ORDER — DULOXETIN HYDROCHLORIDE 30 MG/1
30 CAPSULE, DELAYED RELEASE ORAL DAILY
Status: DISCONTINUED | OUTPATIENT
Start: 2022-11-03 | End: 2022-11-03

## 2022-11-02 RX ORDER — METOPROLOL TARTRATE 50 MG/1
50 TABLET, FILM COATED ORAL EVERY 12 HOURS SCHEDULED
Status: DISCONTINUED | OUTPATIENT
Start: 2022-11-03 | End: 2022-11-08 | Stop reason: HOSPADM

## 2022-11-02 RX ORDER — INSULIN LISPRO 100 [IU]/ML
1-6 INJECTION, SOLUTION INTRAVENOUS; SUBCUTANEOUS
Status: DISCONTINUED | OUTPATIENT
Start: 2022-11-02 | End: 2022-11-02

## 2022-11-02 RX ORDER — OLANZAPINE 5 MG/1
5 TABLET ORAL
Status: DISCONTINUED | OUTPATIENT
Start: 2022-11-02 | End: 2022-11-08 | Stop reason: HOSPADM

## 2022-11-02 RX ORDER — PANTOPRAZOLE SODIUM 40 MG/1
40 TABLET, DELAYED RELEASE ORAL
Status: DISCONTINUED | OUTPATIENT
Start: 2022-11-03 | End: 2022-11-08 | Stop reason: HOSPADM

## 2022-11-02 RX ORDER — AMOXICILLIN 250 MG
1 CAPSULE ORAL DAILY PRN
Status: DISCONTINUED | OUTPATIENT
Start: 2022-11-02 | End: 2022-11-08 | Stop reason: HOSPADM

## 2022-11-02 RX ORDER — LORAZEPAM 0.5 MG/1
0.5 TABLET ORAL
Status: DISCONTINUED | OUTPATIENT
Start: 2022-11-02 | End: 2022-11-08 | Stop reason: HOSPADM

## 2022-11-02 RX ORDER — HYDROCHLOROTHIAZIDE 12.5 MG/1
12.5 TABLET ORAL DAILY
Status: DISCONTINUED | OUTPATIENT
Start: 2022-11-03 | End: 2022-11-08 | Stop reason: HOSPADM

## 2022-11-02 RX ORDER — IPRATROPIUM BROMIDE AND ALBUTEROL SULFATE 2.5; .5 MG/3ML; MG/3ML
3 SOLUTION RESPIRATORY (INHALATION) EVERY 6 HOURS PRN
Status: DISCONTINUED | OUTPATIENT
Start: 2022-11-02 | End: 2022-11-02 | Stop reason: HOSPADM

## 2022-11-02 RX ORDER — BENZTROPINE MESYLATE 1 MG/1
1 TABLET ORAL DAILY
Status: DISCONTINUED | OUTPATIENT
Start: 2022-11-03 | End: 2022-11-08 | Stop reason: HOSPADM

## 2022-11-02 RX ORDER — CLONAZEPAM 0.5 MG/1
0.5 TABLET ORAL ONCE
Status: COMPLETED | OUTPATIENT
Start: 2022-11-02 | End: 2022-11-02

## 2022-11-02 RX ORDER — ACETAMINOPHEN 325 MG/1
975 TABLET ORAL EVERY 6 HOURS PRN
Status: DISCONTINUED | OUTPATIENT
Start: 2022-11-02 | End: 2022-11-08 | Stop reason: HOSPADM

## 2022-11-02 RX ORDER — INSULIN LISPRO 100 [IU]/ML
1-6 INJECTION, SOLUTION INTRAVENOUS; SUBCUTANEOUS
Status: DISCONTINUED | OUTPATIENT
Start: 2022-11-02 | End: 2022-11-08 | Stop reason: HOSPADM

## 2022-11-02 RX ORDER — ACETAMINOPHEN 325 MG/1
975 TABLET ORAL ONCE
Status: COMPLETED | OUTPATIENT
Start: 2022-11-02 | End: 2022-11-02

## 2022-11-02 RX ORDER — INSULIN LISPRO 100 [IU]/ML
1-5 INJECTION, SOLUTION INTRAVENOUS; SUBCUTANEOUS
Status: DISCONTINUED | OUTPATIENT
Start: 2022-11-02 | End: 2022-11-08 | Stop reason: HOSPADM

## 2022-11-02 RX ORDER — ACETAMINOPHEN 325 MG/1
650 TABLET ORAL EVERY 4 HOURS PRN
Status: DISCONTINUED | OUTPATIENT
Start: 2022-11-02 | End: 2022-11-08 | Stop reason: HOSPADM

## 2022-11-02 RX ORDER — LEVOTHYROXINE SODIUM 0.03 MG/1
25 TABLET ORAL
Status: DISCONTINUED | OUTPATIENT
Start: 2022-11-03 | End: 2022-11-08 | Stop reason: HOSPADM

## 2022-11-02 RX ORDER — LORAZEPAM 1 MG/1
1 TABLET ORAL
Status: DISCONTINUED | OUTPATIENT
Start: 2022-11-02 | End: 2022-11-08 | Stop reason: HOSPADM

## 2022-11-02 RX ORDER — ATORVASTATIN CALCIUM 20 MG/1
20 TABLET, FILM COATED ORAL
Status: DISCONTINUED | OUTPATIENT
Start: 2022-11-02 | End: 2022-11-08 | Stop reason: HOSPADM

## 2022-11-02 RX ORDER — OLANZAPINE 2.5 MG/1
2.5 TABLET ORAL
Status: DISCONTINUED | OUTPATIENT
Start: 2022-11-02 | End: 2022-11-08 | Stop reason: HOSPADM

## 2022-11-02 RX ORDER — OLANZAPINE 10 MG/1
5 INJECTION, POWDER, LYOPHILIZED, FOR SOLUTION INTRAMUSCULAR
Status: DISCONTINUED | OUTPATIENT
Start: 2022-11-02 | End: 2022-11-08 | Stop reason: HOSPADM

## 2022-11-02 RX ORDER — LORAZEPAM 2 MG/ML
1 INJECTION INTRAMUSCULAR
Status: DISCONTINUED | OUTPATIENT
Start: 2022-11-02 | End: 2022-11-08 | Stop reason: HOSPADM

## 2022-11-02 RX ORDER — ARIPIPRAZOLE 5 MG/1
5 TABLET ORAL DAILY
Status: DISCONTINUED | OUTPATIENT
Start: 2022-11-03 | End: 2022-11-04

## 2022-11-02 RX ORDER — INSULIN GLARGINE 100 [IU]/ML
30 INJECTION, SOLUTION SUBCUTANEOUS
Status: DISCONTINUED | OUTPATIENT
Start: 2022-11-02 | End: 2022-11-03

## 2022-11-02 RX ORDER — HYDROXYZINE HYDROCHLORIDE 25 MG/1
25 TABLET, FILM COATED ORAL
Status: DISCONTINUED | OUTPATIENT
Start: 2022-11-02 | End: 2022-11-03 | Stop reason: ALTCHOICE

## 2022-11-02 RX ORDER — GABAPENTIN 400 MG/1
800 CAPSULE ORAL 3 TIMES DAILY
Status: DISCONTINUED | OUTPATIENT
Start: 2022-11-02 | End: 2022-11-08 | Stop reason: HOSPADM

## 2022-11-02 RX ADMIN — BENZTROPINE MESYLATE 1 MG: 1 TABLET ORAL at 08:20

## 2022-11-02 RX ADMIN — METFORMIN HYDROCHLORIDE 1000 MG: 500 TABLET ORAL at 17:07

## 2022-11-02 RX ADMIN — ACETAMINOPHEN 975 MG: 325 TABLET ORAL at 00:27

## 2022-11-02 RX ADMIN — CLONAZEPAM 0.5 MG: 0.5 TABLET ORAL at 08:59

## 2022-11-02 RX ADMIN — PANTOPRAZOLE SODIUM 40 MG: 40 TABLET, DELAYED RELEASE ORAL at 08:20

## 2022-11-02 RX ADMIN — LEVOTHYROXINE SODIUM 25 MCG: 25 TABLET ORAL at 06:27

## 2022-11-02 RX ADMIN — INSULIN GLARGINE 30 UNITS: 100 INJECTION, SOLUTION SUBCUTANEOUS at 21:56

## 2022-11-02 RX ADMIN — ACETAMINOPHEN 975 MG: 325 TABLET ORAL at 10:03

## 2022-11-02 RX ADMIN — HYDROXYZINE HYDROCHLORIDE 25 MG: 25 TABLET, FILM COATED ORAL at 21:56

## 2022-11-02 RX ADMIN — INSULIN LISPRO 1 UNITS: 100 INJECTION, SOLUTION INTRAVENOUS; SUBCUTANEOUS at 17:07

## 2022-11-02 RX ADMIN — GABAPENTIN 800 MG: 400 CAPSULE ORAL at 08:20

## 2022-11-02 RX ADMIN — GABAPENTIN 800 MG: 400 CAPSULE ORAL at 21:56

## 2022-11-02 RX ADMIN — GABAPENTIN 800 MG: 400 CAPSULE ORAL at 17:07

## 2022-11-02 RX ADMIN — TRAZODONE HYDROCHLORIDE 50 MG: 50 TABLET ORAL at 21:55

## 2022-11-02 RX ADMIN — METFORMIN HYDROCHLORIDE 1000 MG: 500 TABLET ORAL at 08:20

## 2022-11-02 RX ADMIN — DULOXETINE HYDROCHLORIDE 30 MG: 30 CAPSULE, DELAYED RELEASE ORAL at 08:24

## 2022-11-02 RX ADMIN — ARIPIPRAZOLE 5 MG: 5 TABLET ORAL at 08:19

## 2022-11-02 RX ADMIN — ATORVASTATIN CALCIUM 20 MG: 20 TABLET, FILM COATED ORAL at 17:08

## 2022-11-02 RX ADMIN — CLONAZEPAM 0.5 MG: 0.5 TABLET ORAL at 00:27

## 2022-11-02 NOTE — ASSESSMENT & PLAN NOTE
· BP stable since arrival to the unit  Patient  took an intentional overdose of an unidentified quantity of HCTZ 12 5  and metoprolol 100mg on 10/31/22     · Antihypertensives have been held  · Will restart hctz 12 5 QD and metoprolol 100mg QD tomorrow

## 2022-11-02 NOTE — DISCHARGE INSTR - APPOINTMENTS
Margaret Almodovar or Keren, isaias Pérez and Orion, will be calling you after your discharge, on the phone number that you provided  They will be available as an additional support, if needed  If you wish to speak with one of them, you may contact Aster Barboza at [phone] or Jess Brorego at [phone]

## 2022-11-02 NOTE — ASSESSMENT & PLAN NOTE
Patient is medically cleared for admission to the [facility] for treatment of the underlying psychiatric illness

## 2022-11-02 NOTE — ED CARE HANDOFF
Emergency Department Sign Out Note        Sign out and transfer of care from Dr Marco Antonio Shin  See Separate Emergency Department note  The patient, Dian Zarate, was evaluated by the previous provider for Suicide attempt  Workup Completed:  Patient received a medical screening examination and lab work for overdose on beta blockers  ED Course / Workup Pending (followup): Patient is medically cleared for psychiatric hospitalization  201 signed  Accepted to [facility]   Pending transport this am at 0900          Labs Reviewed   CBC AND DIFFERENTIAL - Abnormal       Result Value Ref Range Status    WBC 14 76 (*) 4 31 - 10 16 Thousand/uL Final    RBC 4 74  3 81 - 5 12 Million/uL Final    Hemoglobin 14 0  11 5 - 15 4 g/dL Final    Hematocrit 41 5  34 8 - 46 1 % Final    MCV 88  82 - 98 fL Final    MCH 29 5  26 8 - 34 3 pg Final    MCHC 33 7  31 4 - 37 4 g/dL Final    RDW 13 2  11 6 - 15 1 % Final    MPV 9 8  8 9 - 12 7 fL Final    Platelets 732  469 - 390 Thousands/uL Final    nRBC 0  /100 WBCs Final    Neutrophils Relative 63  43 - 75 % Final    Immat GRANS % 0  0 - 2 % Final    Lymphocytes Relative 28  14 - 44 % Final    Monocytes Relative 8  4 - 12 % Final    Eosinophils Relative 1  0 - 6 % Final    Basophils Relative 0  0 - 1 % Final    Neutrophils Absolute 9 22 (*) 1 85 - 7 62 Thousands/µL Final    Immature Grans Absolute 0 06  0 00 - 0 20 Thousand/uL Final    Lymphocytes Absolute 4 17  0 60 - 4 47 Thousands/µL Final    Monocytes Absolute 1 14  0 17 - 1 22 Thousand/µL Final    Eosinophils Absolute 0 11  0 00 - 0 61 Thousand/µL Final    Basophils Absolute 0 06  0 00 - 0 10 Thousands/µL Final   COMPREHENSIVE METABOLIC PANEL - Abnormal    Sodium 135  135 - 147 mmol/L Final    Potassium 3 9  3 5 - 5 3 mmol/L Final    Chloride 99  96 - 108 mmol/L Final    CO2 26  21 - 32 mmol/L Final    ANION GAP 10  4 - 13 mmol/L Final    BUN 7  5 - 25 mg/dL Final    Creatinine 0 74  0 60 - 1 30 mg/dL Final    Comment: Standardized to IDMS reference method    Glucose 229 (*) 65 - 140 mg/dL Final    Comment: If the patient is fasting, the ADA then defines impaired fasting glucose as > 100 mg/dL and diabetes as > or equal to 123 mg/dL  Specimen collection should occur prior to Sulfasalazine administration due to the potential for falsely depressed results  Specimen collection should occur prior to Sulfapyridine administration due to the potential for falsely elevated results  Calcium 9 6  8 4 - 10 2 mg/dL Final    AST 18  13 - 39 U/L Final    Comment: Specimen collection should occur prior to Sulfasalazine administration due to the potential for falsely depressed results  ALT 30  7 - 52 U/L Final    Comment: Specimen collection should occur prior to Sulfasalazine administration due to the potential for falsely depressed results       Alkaline Phosphatase 93  34 - 104 U/L Final    Total Protein 7 3  6 4 - 8 4 g/dL Final    Albumin 4 2  3 5 - 5 0 g/dL Final    Total Bilirubin 0 53  0 20 - 1 00 mg/dL Final    eGFR 91  ml/min/1 73sq m Final    Narrative:     [place] guidelines for Chronic Kidney Disease (CKD):   •  Stage 1 with normal or high GFR (GFR > 90 mL/min/1 73 square meters)  •  Stage 2 Mild CKD (GFR = 60-89 mL/min/1 73 square meters)  •  Stage 3A Moderate CKD (GFR = 45-59 mL/min/1 73 square meters)  •  Stage 3B Moderate CKD (GFR = 30-44 mL/min/1 73 square meters)  •  Stage 4 Severe CKD (GFR = 15-29 mL/min/1 73 square meters)  •  Stage 5 End Stage CKD (GFR <15 mL/min/1 73 square meters)  Note: GFR calculation is accurate only with a steady state creatinine   TSH, 3RD GENERATION - Abnormal    TSH [address] [place] [address] 6 069 (*) 0 450 - 4 500 uIU/mL Final    Comment: The recommended reference ranges for TSH during pregnancy are as follows:   First trimester 0 1 to 2 5 uIU/mL   Second trimester  0 2 to 3 0 uIU/mL   Third trimester 0 3 to 3 0 uIU/m    Note: Normal ranges may not apply to patients who are transgender, non-binary, or whose legal sex, sex at birth, and gender identity differ  Adult TSH (3rd generation) reference range follows the recommended guidelines of the American Thyroid Association, January, 2020  Narrative:     Patients undergoing fluorescein dye angiography may retain small amounts of fluorescein in the body for 48-72 hours post procedure  Samples containing fluorescein can produce falsely depressed TSH values  If the patient had this procedure,a specimen should be resubmitted post fluorescein clearance  ACETAMINOPHEN LEVEL - Abnormal    Acetaminophen Level <10 (*) 10 - 20 ug/mL Final   POCT GLUCOSE - Abnormal    POC Glucose 213 (*) 65 - 140 mg/dl Final   NOVEL CORONAVIRUS (COVID-19), PCR [facility] - Normal    SARS-CoV-2 Negative  Negative Final    Narrative:     FOR PEDIATRIC PATIENTS - copy/paste COVID Guidelines URL to browser: [email or web address]  Ayannahx    SARS-CoV-2 assay is a Nucleic Acid Amplification assay intended for the  qualitative detection of nucleic acid from SARS-CoV-2 in nasopharyngeal  swabs  Results are for the presumptive identification of SARS-CoV-2 RNA  Positive results are indicative of infection with SARS-CoV-2, the virus  causing COVID-19, but do not rule out bacterial infection or co-infection  with other viruses  Laboratories within the [place] and its  territories are required to report all positive results to the appropriate  public health authorities  Negative results do not preclude SARS-CoV-2  infection and should not be used as the sole basis for treatment or other  patient management decisions  Negative results must be combined with  clinical observations, patient history, and epidemiological information  This test has not been FDA cleared or approved  This test has been authorized by FDA under an Emergency Use Authorization  (EUA)   This test is only authorized for the duration of time the  declaration that circumstances exist justifying the authorization of the  emergency use of an in vitro diagnostic tests for detection of SARS-CoV-2  virus and/or diagnosis of COVID-19 infection under section 564(b)(1) of  the Act, 21 U  S C  435JPF-4(F)(7), unless the authorization is terminated  or revoked sooner  The test has been validated but independent review by FDA  and CLIA is pending  Test performed using [employer] GeneXpert: This RT-PCR assay targets N2,  a region unique to SARS-CoV-2  A conserved region in the E-gene was chosen  for pan-Sarbecovirus detection which includes SARS-CoV-2  According to CMS-2020-01-R, this platform meets the definition of high-throughput technology  NOVEL CORONAVIRUS (COVID-19), PCR [facility] - Normal    SARS-CoV-2 Negative  Negative Final    Narrative:     FOR PEDIATRIC PATIENTS - copy/paste COVID Guidelines URL to browser: [email or web address]  [employer]    SARS-CoV-2 assay is a Nucleic Acid Amplification assay intended for the  qualitative detection of nucleic acid from SARS-CoV-2 in nasopharyngeal  swabs  Results are for the presumptive identification of SARS-CoV-2 RNA  Positive results are indicative of infection with SARS-CoV-2, the virus  causing COVID-19, but do not rule out bacterial infection or co-infection  with other viruses  Laboratories within the [place] and its  territories are required to report all positive results to the appropriate  public health authorities  Negative results do not preclude SARS-CoV-2  infection and should not be used as the sole basis for treatment or other  patient management decisions  Negative results must be combined with  clinical observations, patient history, and epidemiological information  This test has not been FDA cleared or approved  This test has been authorized by FDA under an Emergency Use Authorization  (EUA)   This test is only authorized for the duration of time the  declaration that circumstances exist justifying the authorization of the  emergency use of an in vitro diagnostic tests for detection of SARS-CoV-2  virus and/or diagnosis of COVID-19 infection under section 564(b)(1) of  the Act, 21 U  S C  120BDF-2(O)(9), unless the authorization is terminated  or revoked sooner  The test has been validated but independent review by FDA  and CLIA is pending  Test performed using [id number] GeneXpert: This RT-PCR assay targets N2,  a region unique to SARS-CoV-2  A conserved region in the E-gene was chosen  for pan-Sarbecovirus detection which includes SARS-CoV-2  According to CMS-2020-01-R, this platform meets the definition of high-throughput technology  RAPID DRUG SCREEN, URINE - Normal    Amph/Meth UR Negative  Negative Final    Barbiturate Ur Negative  Negative Final    Benzodiazepine Urine Negative  Negative Final    Cocaine Urine Negative  Negative Final    Methadone Urine Negative  Negative Final    Opiate Urine Negative  Negative Final    PCP Ur Negative  Negative Final    THC Urine Negative  Negative Final    Oxycodone Urine Negative  Negative Final    Narrative:     FOR MEDICAL PURPOSES ONLY  IF CONFIRMATION NEEDED PLEASE CONTACT THE LAB WITHIN 5 DAYS      Drug Screen Cutoff Levels:  AMPHETAMINE/METHAMPHETAMINES  1000 ng/mL  BARBITURATES     200 ng/mL  BENZODIAZEPINES     200 ng/mL  COCAINE      300 ng/mL  METHADONE      300 ng/mL  OPIATES      300 ng/mL  PHENCYCLIDINE     25 ng/mL  THC       50 ng/mL  OXYCODONE      100 ng/mL   MEDICAL ALCOHOL - Normal    Ethanol Lvl <10  <10 mg/dL Final   UA W REFLEX TO MICROSCOPIC WITH REFLEX TO CULTURE - Normal    Color, UA Yellow  Yellow Final    Clarity, UA Clear  Clear Final    Specific Gravity, UA 1 015  1 001 - 1 030 Final    pH, UA 7 0  5 0, 5 5, 6 0, 6 5, 7 0, 7 5, 8 0 Final    Leukocytes, UA Negative  Negative Final    Nitrite, UA Negative  Negative Final    Protein, UA Negative  Negative, Interference- unable to analyze mg/dl Final Glucose, UA Negative  Negative mg/dl Final    Ketones, UA Negative  Negative mg/dl Final    Urobilinogen, UA 0 2  0 2, 1 0 E U /dl E U /dl Final    Bilirubin, UA Negative  Negative Final    Occult Blood, UA Negative  Negative Final   SALICYLATE LEVEL - Normal    Salicylate Lvl <5  3 - 20 mg/dL Final   POCT ALCOHOL BREATH TEST - Normal    EXTBreath Alcohol 0 000   Final         No results found  ED Course as of 11/02/22 0822   Wed Nov 02, 2022   8301 Beta blocker overdose  Signed 201  Medically cleared awaiting placement  2662 Accepted to [facility]  Dr Shahnaz Hall   0099 Transport 0900  [place] signed     Procedures  MDM        Disposition  Final diagnoses:   Intentional drug overdose, initial encounter ([facility] )   Suicide attempt [facility])     Time reflects when diagnosis was documented in both MDM as applicable and the Disposition within this note     Time User Action Codes Description Comment    11/1/2022  2:23 AM Mili Cooler Add Stanton Bolanos Intentional drug overdose, initial encounter ([facility] [address] )     11/1/2022  2:23 AM Mili Cooler Add Ines Lagos Suicide attempt [facility])       ED Disposition     ED Disposition   Transfer to [address] [place] [address]   --    Date/Time   Tue Nov 1, 2022  2:23 AM    Comment   Mary Carson should be transferred out to pending facility and has been medically cleared              MD Documentation    Theodore Alamo Most Recent Value   Patient Condition The patient has been stabilized such that within reasonable medical probability, no material deterioration of the patient condition or the condition of the unborn child(sage) is likely to result from the transfer   Reason for Transfer Level of Care needed not available at this facility   Benefits of Transfer Specialized equipment and/or services available at the receiving facility (Include comment)________________________  [Inpatient mental health]   Risks of Transfer Potential for delay in receiving treatment   Accepting Physician [address]  Name, [facility]    (Name & Tel number) Gaby Bradford [phone]   Transported by (Company and Unit #) [facility]   Sending MD Frye   Provider Certification The patient is stable for psychiatric transfer because they are medically stable, and is protected from harming him/herself or others during transport      RN Documentation    [address] Name, [facility]    (Name & Tel number) Gaby Bradford [phone]   Transported by (Company and Unit #) [facility]      Follow-up Information    None       Patient's Medications   Discharge Prescriptions    No medications on file     No discharge procedures on file         ED Provider  Electronically Signed by     Citlaly Love, DO  11/02/22 [address] [place] [address], DO  11/02/22 [address] [place] [address], DO  11/02/22 0478

## 2022-11-02 NOTE — ASSESSMENT & PLAN NOTE
Lab Results   Component Value Date    HGBA1C 6 8 (H) 06/19/2022       Recent Labs     10/31/22  2049   POCGLU 213*       Blood Sugar Average: Last 72 hrs:  · Well controled as evidenced by A1C below goal at 6 8 on 06/19/22  · Pre hospital antihyperglycemic regimen includes:  · Metformin 1000mg bid  · Lantus 30 units daily  · Continue above while inpatient   Add correctional coverage with SSI  · ADA diet  · Hypoglycemia protocol

## 2022-11-02 NOTE — ED RE-EVALUATION NOTE
I received sign-out on the patient at change of shift  1  Intentional drug overdose, initial encounter ([facility] [address] )    2  Suicide attempt [facility])      ED Course as of 11/02/22 0701   Mon Oct 31, 2022   2041 EKG: SR at 66 with late transition, , ST-t normal, Qtc 453   2105 Patient is currently asymptomatic  I evaluated the patient and ordered labs, EKG, and consulted with toxicology  Tox advised ED observation until 2am and admission if patient has concerning electrolyte disturbances, and admission to ICU with pressors if patient develops bradycardia or hypotension  2200 I performed serial examinations of the patient to watch for developing hypotension, bradycardia, or depression of mental status  2222 Patient's pulse ox decreased to 89% when she was sleeping  I went to the room and talked to the patient  She was very easily aroused  She reports she does smoke a pack a day  I do notice some faint wheezing  I ordered a DuoNeb  Patient is not at all somnolent so I suspect low pulse ox is due to her heavy smoking and wheezing  HR 67 on my repeat examination  2253 Patient is feeling very anxious  She usually takes klonopin  Will prescribe home medication  Patient has remained asymptomatic from ingestion  HR 73 on re-evaluation  2300 I continued to perform serial examinations of the patient to watch for developing hypotension, bradycardia, or depression of mental status  Patient remains normotensive at this time without bradycardia or AMS  Tue Nov 01, 2022   0117 Two doctor 36 signed due to suicide attempt and need for psychiatric hospitalization  0221 Patient remains asymptomatic without bradycardia or hypotension  Patient is medically cleared for psychiatric evaluation and/or admission  4434 Case signed out to oncoming provider at change of shift pending crisis evaluation for placement  Wed Nov 02, 2022   0115 Case signed out to me at change of shift pending placement       0700 Case signed out to oncoming provider at change of shift pending placement  Disposition: pending placement         Graciela Carias MD  11/02/22 7221

## 2022-11-02 NOTE — ASSESSMENT & PLAN NOTE
· Patient reports history of goiter  · TSH pending  · Continue pre hospital levothyroxine 25mcg  · Encourage outpatient follow up with endocrinology

## 2022-11-02 NOTE — ED NOTES
Pt escorted to shower by sitter  Linens changed and new scrubs provided        Ayleen Najera RN  11/01/22 0577
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
## 2022-11-02 NOTE — EMTALA/ACUTE CARE TRANSFER
Formerly [facility] EMERGENCY DEPARTMENT  [address] [place], [place] [address]  Dept: [phone]      [id number] TRANSFER CONSENT    NAME Anai Sam                                         1965                              MRN [id number]    I have been informed of my rights regarding examination, treatment, and transfer   by Dr Katiana Pete DO    Benefits: Specialized equipment and/or services available at the receiving facility (Include comment)________________________ (Inpatient mental health)    Risks: Potential for delay in receiving treatment      Consent for Transfer:  I acknowledge that my medical condition has been evaluated and explained to me by the emergency department physician or other qualified medical person and/or my attending physician, who has recommended that I be transferred to the service of  Accepting Physician: Paz Cruz at [address] Name, [address] : [address] [facility]  The above potential benefits of such transfer, the potential risks associated with such transfer, and the probable risks of not being transferred have been explained to me, and I fully understand them  The doctor has explained that, in my case, the benefits of transfer outweigh the risks  I agree to be transferred  I authorize the performance of emergency medical procedures and treatments upon me in both transit and upon arrival at the receiving facility  Additionally, I authorize the release of any and all medical records to the receiving facility and request they be transported with me, if possible  I understand that the safest mode of transportation during a medical emergency is an ambulance and that the Hospital advocates the use of this mode of transport  Risks of traveling to the receiving facility by car, including absence of medical control, life sustaining equipment, such as oxygen, and medical personnel has been explained to me and I fully understand them      (TIM PINO BOX BELOW)  [  ]  I consent to the stated transfer and to be transported by ambulance/helicopter  [  ]  I consent to the stated transfer, but refuse transportation by ambulance and accept full responsibility for my transportation by car  I understand the risks of non-ambulance transfers and I exonerate the Hospital and its staff from any deterioration in my condition that results from this refusal     X___________________________________________    DATE  22  TIME________  Signature of patient or legally responsible individual signing on patient behalf           RELATIONSHIP TO PATIENT_________________________          Provider Certification    NAME Denise Morris                                         1965                              MRN [id number]    A medical screening exam was performed on the above named patient  Based on the examination:    Condition Necessitating Transfer The primary encounter diagnosis was Intentional drug overdose, initial encounter ([facility] )  A diagnosis of Suicide attempt [facility]) was also pertinent to this visit      Patient Condition: The patient has been stabilized such that within reasonable medical probability, no material deterioration of the patient condition or the condition of the unborn child(sage) is likely to result from the transfer    Reason for Transfer: Level of Care needed not available at this facility    Transfer Requirements: [address]   · Space available and qualified personnel available for treatment as acknowledged by Tamara Phipps [phone]  · Agreed to accept transfer and to provide appropriate medical treatment as acknowledged by       Dr Babin  · Appropriate medical records of the examination and treatment of the patient are provided at the time of transfer   [address], [address] _______  · Transfer will be performed by qualified personnel from [address] [place] [address]  and appropriate transfer equipment as required, including the use of necessary and appropriate life support measures  Provider Certification: I have examined the patient and explained the following risks and benefits of being transferred/refusing transfer to the patient/family:  The patient is stable for psychiatric transfer because they are medically stable, and is protected from harming him/herself or others during transport      Based on these reasonable risks and benefits to the patient and/or the unborn child(sage), and based upon the information available at the time of the patient’s examination, I certify that the medical benefits reasonably to be expected from the provision of appropriate medical treatments at another medical facility outweigh the increasing risks, if any, to the individual’s medical condition, and in the case of labor to the unborn child, from effecting the transfer      X____________________________________________ DATE 11/02/22        TIME_______      ORIGINAL - SEND TO MEDICAL RECORDS   COPY - SEND WITH PATIENT DURING TRANSFER

## 2022-11-02 NOTE — CONSULTS
[address] [facility] 1965, 56 y o  female MRN: [id number]  Unit/Bed#: Oli Cueva 983-08 Encounter: [id number]  Primary Care Provider: Lourdes Arredondo MD   Date and time admitted to hospital: 11/2/2022  9:47 AM    Inpatient consult for Medical Clearance for [facility] patient  Consult performed by: Dakotah Rosado PA-C  Consult ordered by: Frannie Benitez MD        Medical clearance for psychiatric admission  Assessment & Plan  Patient is medically cleared for admission to the [facility] for treatment of the underlying psychiatric illness    Cigarette smoker  Assessment & Plan  · Encouraged cessation  · Offer NRT    Primary hypertension  Assessment & Plan  · BP stable since arrival to the unit  Patient  took an intentional overdose of an unidentified quantity of HCTZ 12 5  and metoprolol 100mg on 10/31/22  · Antihypertensives have been held  · Will restart hctz 12 5 QD and metoprolol at 50mg bid tomorrow    Diabetes mellitus type 2 in obese [facility])  Assessment & Plan  Lab Results   Component Value Date    HGBA1C 6 8 (H) 06/19/2022       Recent Labs     10/31/22  2049   POCGLU 213*       Blood Sugar Average: Last 72 hrs:  · Well controled as evidenced by A1C below goal at 6 8 on 06/19/22  · Pre hospital antihyperglycemic regimen includes:  · Metformin 1000mg bid  · Lantus 30 units daily  · Continue above while inpatient  Add correctional coverage with SSI  · ADA diet  · Hypoglycemia protocol    Gastroesophageal reflux disease without esophagitis  Assessment & Plan  · Continue pre hospital pantoprazole 40mg QD    Hyperlipemia  Assessment & Plan  · Continue pre hospital atorvastatin 20mg daily    Acquired hypothyroidism  Assessment & Plan  · Patient reports history of goiter  · TSH pending  · Continue pre hospital levothyroxine 25mcg  · Encourage outpatient follow up with endocrinology      Counseling / Coordination of Care Time: 45 minutes    Greater than 50% of total time spent on patient counseling and coordination of care  Collaboration of Care: Were Recommendations Directly Discussed with Primary Treatment Team? - No     History of Present Illness:    Derick Hein is a 56 y o  female who is originally admitted to the psychiatry service due to intentional overdose  We are consulted for medical clearance for admission to [facility] Unit and treatment of underlying psychiatric illness  This patient has a past medical history significant for HLD, HTN, Type 2 DM, GERD, Hypothyroidism  She initially presented to the ED for an intentional overdose on metoprolol, hydrochlorothiazide, and gabapentin  On evaluation patient denies any physical complaints such as headache, dizziness, chest pain, shortness of breath, nausea/vomiting/diarrhea at this time  Review of Systems:    Review of Systems   Constitutional: Negative for activity change, chills, diaphoresis and fever  HENT: Negative for congestion, rhinorrhea, sinus pressure, sinus pain and sore throat  Eyes: Negative for visual disturbance  Respiratory: Negative for cough, shortness of breath and wheezing  Cardiovascular: Negative for chest pain and palpitations  Gastrointestinal: Negative for abdominal distention, abdominal pain, constipation, diarrhea, nausea and vomiting  Genitourinary: Negative for dysuria, frequency, hematuria and urgency  Musculoskeletal: Negative for arthralgias, back pain and myalgias  Skin: Negative for rash  Neurological: Negative for dizziness, weakness, light-headedness and headaches         Past Medical and Surgical History:     Past Medical History:   Diagnosis Date   • Addiction to drug [facility])    • Adjustment disorder    • Alcohol abuse    • Alcoholism ([facility] )    • Anxiety    • Bipolar disorder ([facility] )    • Bowel obstruction ([facility] )    • Depression    • Diabetes type 2, controlled ([facility] )    • Disease of thyroid gland    • Gastritis    • Head injury    • Heart palpitations    • Hyperlipidemia    • Hypertension    • Hypothyroidism    • Psychiatric illness    • PTSD (post-traumatic stress disorder)    • Seizure ([facility] )    • Seizures ([facility] )    • Sleep difficulties    • Substance abuse ([facility] )    • Suicide attempt ([facility] )    • Withdrawal symptoms, drug or narcotic ([facility] )        Past Surgical History:   Procedure Laterality Date   • ABDOMINAL SURGERY     • APPENDECTOMY     • BOWEL RESECTION     • CHOLECYSTECTOMY     • ESOPHAGOGASTRODUODENOSCOPY N/A 5/18/2018    Procedure: ESOPHAGOGASTRODUODENOSCOPY (EGD) with bx;  Surgeon: Niya Ramos DO;  Location: [facility]; Service: Gastroenterology   • HYSTERECTOMY     • KNEE SURGERY Bilateral        Meds/Allergies:    all medications and allergies reviewed    Allergies: Allergies   Allergen Reactions   • Losartan Cough   • Latex Rash       Social History:     Marital Status:     Substance Use History:   Social History     Substance and Sexual Activity   Alcohol Use Not Currently   • Alcohol/week: 6 0 standard drinks   • Types: 6 Cans of beer per week    Comment: last drink on 08/15/20     Social History     Tobacco Use   Smoking Status Current Every Day Smoker   • Packs/day: 1 00   • Years: 25 00   • Pack years: 25 00   • Types: Cigarettes   Smokeless Tobacco Never Used   Tobacco Comment    smokes like 5 a day(06/24/2022)     Social History     Substance and Sexual Activity   Drug Use No       Family History:    non-contributory    Physical Exam:     Vitals:   Blood Pressure: 144/86 (11/02/22 0945)  Pulse: 77 (11/02/22 0945)  Temperature: 97 5 °F (36 4 °C) (11/02/22 0945)  Temp Source: Temporal (11/02/22 0945)  Respirations: 18 (11/02/22 0945)  Height: 5' 3" (160 cm) (11/02/22 0945)  Weight - Scale: 86 4 kg (190 lb 7 6 oz) (11/02/22 0945)  SpO2: 96 % (11/02/22 0945)    Physical Exam  Constitutional:       General: She is not in acute distress  Appearance: Normal appearance  She is not ill-appearing, toxic-appearing or diaphoretic     HENT: Head: Normocephalic and atraumatic  Nose: Nose normal  No congestion or rhinorrhea  Mouth/Throat:      Mouth: Mucous membranes are moist    Eyes:      General: No scleral icterus  Extraocular Movements: Extraocular movements intact  Cardiovascular:      Rate and Rhythm: Normal rate and regular rhythm  Heart sounds: Normal heart sounds  No murmur heard  Pulmonary:      Effort: Pulmonary effort is normal  No respiratory distress  Breath sounds: Normal breath sounds  No wheezing  Abdominal:      General: Abdomen is flat  Bowel sounds are normal  There is no distension  Palpations: Abdomen is soft  Tenderness: There is no abdominal tenderness  Musculoskeletal:      Right lower leg: No edema  Left lower leg: No edema  Skin:     General: Skin is warm and dry  Coloration: Skin is not jaundiced  Neurological:      General: No focal deficit present  Mental Status: She is alert and oriented to person, place, and time  Additional Data:     Lab Results: I have personally reviewed pertinent reports        Results from last 7 days   Lab Units 10/31/22  2042   WBC Thousand/uL 14 76*   HEMOGLOBIN g/dL 14 0   HEMATOCRIT % 41 5   PLATELETS Thousands/uL 383   NEUTROS PCT % 63   LYMPHS PCT % 28   MONOS PCT % 8   EOS PCT % 1     Results from last 7 days   Lab Units 10/31/22  2042   SODIUM mmol/L 135   POTASSIUM mmol/L 3 9   CHLORIDE mmol/L 99   CO2 mmol/L 26   BUN mg/dL 7   CREATININE mg/dL 0 74   ANION GAP mmol/L 10   CALCIUM mg/dL 9 6   ALBUMIN g/dL 4 2   TOTAL BILIRUBIN mg/dL 0 53   ALK PHOS U/L 93   ALT U/L 30   AST U/L 18   GLUCOSE RANDOM mg/dL 229*             Lab Results   Component Value Date/Time    HGBA1C 6 8 (H) 06/19/2022 10:17 AM    HGBA1C 8 6 (H) 07/01/2021 02:40 PM    HGBA1C 9 9 (H) 03/28/2021 04:12 PM    HGBA1C 6 8 (H) 09/02/2020 03:38 PM    HGBA1C 5 2 09/01/2018 06:24 AM     Results from last 7 days   Lab Units 10/31/22  2049   POC GLUCOSE mg/dl 213* EKG, Pathology, and Other Studies Reviewed on Admission:   · EKG on 10/31/22 shows NSR with septal infarct (cited on or before 3/28/22) HR 78, QTc 453    ** Please Note: This note has been constructed using a voice recognition system   **

## 2022-11-02 NOTE — ED NOTES
Report called to [facility]  Spoke with [employer]  Pt left department with cts    All belonging sent with patient     Sj Powell RN  11/02/22 Karen Berumen  11/02/22 3073

## 2022-11-02 NOTE — PLAN OF CARE
Problem: PAIN - ADULT  Goal: Verbalizes/displays adequate comfort level or baseline comfort level  Description: Interventions:  - Encourage patient to monitor pain and request assistance  - Assess pain using appropriate pain scale  - Administer analgesics based on type and severity of pain and evaluate response  - Implement non-pharmacological measures as appropriate and evaluate response  - Consider cultural and social influences on pain and pain management  - Notify physician/advanced practitioner if interventions unsuccessful or patient reports new pain  Outcome: Progressing     Problem: INFECTION - ADULT  Goal: Absence or prevention of progression during hospitalization  Description: INTERVENTIONS:  - Assess and monitor for signs and symptoms of infection  - Monitor lab/diagnostic results  - Monitor all insertion sites, i e  indwelling lines, tubes, and drains  - Monitor endotracheal if appropriate and nasal secretions for changes in amount and color  - [place] appropriate cooling/warming therapies per order  - Administer medications as ordered  - Instruct and encourage patient and family to use good hand hygiene technique  - Identify and instruct in appropriate isolation precautions for identified infection/condition  Outcome: Progressing  Goal: Absence of fever/infection during neutropenic period  Description: INTERVENTIONS:  - Monitor WBC    Outcome: Progressing     Problem: SAFETY ADULT  Goal: Patient will remain free of falls  Description: INTERVENTIONS:  - Educate patient/family on patient safety including physical limitations  - Instruct patient to call for assistance with activity   - Consult OT/PT to assist with strengthening/mobility   - Keep Call bell within reach  - Keep bed low and locked with side rails adjusted as appropriate  - Keep care items and personal belongings within reach  - Initiate and maintain comfort rounds  - Make Fall Risk Sign visible to staff  - Offer Toileting every Hours, in advance of need  - Initiate/Maintainalarm  - Obtain necessary fall risk management equipment:   - Apply yellow socks and bracelet for high fall risk patients  - Consider moving patient to room near nurses station  Outcome: Progressing  Goal: Maintain or return to baseline ADL function  Description: INTERVENTIONS:  -  Assess patient's ability to carry out ADLs; assess patient's baseline for ADL function and identify physical deficits which impact ability to perform ADLs (bathing, care of mouth/teeth, toileting, grooming, dressing, etc )  - Assess/evaluate cause of self-care deficits   - Assess range of motion  - Assess patient's mobility; develop plan if impaired  - Assess patient's need for assistive devices and provide as appropriate  - Encourage maximum independence but intervene and supervise when necessary  - Involve family in performance of ADLs  - Assess for home care needs following discharge   - Consider OT consult to assist with ADL evaluation and planning for discharge  - Provide patient education as appropriate  Outcome: Progressing  Goal: Maintains/Returns to pre admission functional level  Description: INTERVENTIONS:  - Perform BMAT or MOVE assessment daily    - Set and communicate daily mobility goal to care team and patient/family/caregiver  - Collaborate with rehabilitation services on mobility goals if consulted  - Perform Range of Motion  times a day  - Reposition patient every  hours    - Dangle patient  times a day  - Stand patient  times a day  - Ambulate patient  times a day  - Out of bed to chair  times a day   - Out of bed for meals  times a day  - Out of bed for toileting  - Record patient progress and toleration of activity level   Outcome: Progressing     Problem: Knowledge Deficit  Goal: Patient/family/caregiver demonstrates understanding of disease process, treatment plan, medications, and discharge instructions  Description: Complete learning assessment and assess knowledge base   Interventions:  - Provide teaching at level of understanding  - Provide teaching via preferred learning methods  Outcome: Progressing

## 2022-11-02 NOTE — NURSING NOTE
Patient is 89 admitted to 6T psychiatric due to suicidal attempt by intentional overdose on multiple prescribed medications  Patient currently denies S, AH, VH  Patient reports anxiety 3/4, and 6/10 for depression  Patient is calm, AxOX4, pleasant on approach, and cooperative with assessment questions  Patient c/o headache rated 8/10, PRN tylenol given at 1003  Patient reported partially effective  Patient skin assessment intact, steady gait, and vss  Will continue to monitor and maintain Q 7 min checks

## 2022-11-03 LAB
GLUCOSE SERPL-MCNC: 111 MG/DL (ref 65–140)
GLUCOSE SERPL-MCNC: 163 MG/DL (ref 65–140)
GLUCOSE SERPL-MCNC: 176 MG/DL (ref 65–140)
GLUCOSE SERPL-MCNC: 85 MG/DL (ref 65–140)
RPR SER QL: NORMAL

## 2022-11-03 RX ORDER — TRAZODONE HYDROCHLORIDE 100 MG/1
200 TABLET ORAL
Status: DISCONTINUED | OUTPATIENT
Start: 2022-11-03 | End: 2022-11-08 | Stop reason: HOSPADM

## 2022-11-03 RX ORDER — CLONAZEPAM 0.5 MG/1
0.5 TABLET ORAL 2 TIMES DAILY PRN
Status: DISCONTINUED | OUTPATIENT
Start: 2022-11-03 | End: 2022-11-04

## 2022-11-03 RX ORDER — INSULIN GLARGINE 100 [IU]/ML
25 INJECTION, SOLUTION SUBCUTANEOUS
Status: DISCONTINUED | OUTPATIENT
Start: 2022-11-03 | End: 2022-11-08 | Stop reason: HOSPADM

## 2022-11-03 RX ADMIN — INSULIN GLARGINE 25 UNITS: 100 INJECTION, SOLUTION SUBCUTANEOUS at 21:53

## 2022-11-03 RX ADMIN — INSULIN LISPRO 1 UNITS: 100 INJECTION, SOLUTION INTRAVENOUS; SUBCUTANEOUS at 12:31

## 2022-11-03 RX ADMIN — TRAZODONE HYDROCHLORIDE 200 MG: 100 TABLET ORAL at 21:29

## 2022-11-03 RX ADMIN — METOPROLOL TARTRATE 50 MG: 50 TABLET, FILM COATED ORAL at 21:29

## 2022-11-03 RX ADMIN — LORAZEPAM 0.5 MG: 0.5 TABLET ORAL at 11:25

## 2022-11-03 RX ADMIN — NICOTINE POLACRILEX 2 MG: 2 GUM, CHEWING BUCCAL at 16:48

## 2022-11-03 RX ADMIN — PANTOPRAZOLE SODIUM 40 MG: 40 TABLET, DELAYED RELEASE ORAL at 07:15

## 2022-11-03 RX ADMIN — GABAPENTIN 800 MG: 400 CAPSULE ORAL at 21:29

## 2022-11-03 RX ADMIN — INSULIN LISPRO 1 UNITS: 100 INJECTION, SOLUTION INTRAVENOUS; SUBCUTANEOUS at 08:45

## 2022-11-03 RX ADMIN — HYDROCHLOROTHIAZIDE 12.5 MG: 12.5 TABLET ORAL at 08:42

## 2022-11-03 RX ADMIN — BENZTROPINE MESYLATE 1 MG: 1 TABLET ORAL at 08:42

## 2022-11-03 RX ADMIN — LEVOTHYROXINE SODIUM 25 MCG: 25 TABLET ORAL at 06:03

## 2022-11-03 RX ADMIN — ATORVASTATIN CALCIUM 20 MG: 20 TABLET, FILM COATED ORAL at 17:44

## 2022-11-03 RX ADMIN — METFORMIN HYDROCHLORIDE 1000 MG: 500 TABLET ORAL at 17:44

## 2022-11-03 RX ADMIN — GABAPENTIN 800 MG: 400 CAPSULE ORAL at 17:45

## 2022-11-03 RX ADMIN — METFORMIN HYDROCHLORIDE 1000 MG: 500 TABLET ORAL at 08:41

## 2022-11-03 RX ADMIN — DULOXETINE 30 MG: 30 CAPSULE, DELAYED RELEASE ORAL at 08:42

## 2022-11-03 RX ADMIN — ARIPIPRAZOLE 5 MG: 5 TABLET ORAL at 08:42

## 2022-11-03 RX ADMIN — METOPROLOL TARTRATE 50 MG: 50 TABLET, FILM COATED ORAL at 08:42

## 2022-11-03 RX ADMIN — GABAPENTIN 800 MG: 400 CAPSULE ORAL at 08:42

## 2022-11-03 NOTE — NURSING NOTE
Pt requested and received prn Trazodone 50mg and prn Atarax 25mg at 2156 to assist with 4/4 anxiety and insomnia
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
## 2022-11-03 NOTE — NURSING NOTE
Pt was observed walking the halls in and out of the dayroom and TV room throughout the shift  Patient is pleasant and in good spirits  Pt rated anxiety 3/4 while having 6/10 depression  Pt denies any AH, VH, SI and HI  Patient denies pain at this time  No agitation or aggression noted  Patient appetite good at dinner  Pt compliant with medications as she tolerated them well  Blood sugar checked and SS administered accordingly  Q 7 minute checks maintained
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
## 2022-11-03 NOTE — TREATMENT PLAN
TREATMENT PLAN REVIEW - [address] 56 y o  1965 female MRN: [id number]    [address] Room / Bed: [facility] 610/[facility] Encounter: [id number]          Admit Date/Time:  11/2/2022  9:47 AM    Treatment Team: Attending Provider: Audrey Leong MD; Consulting Physician: Gerald Morel PA-C; : Severo Christine; Licensed Practical Nurse: Álvaro John LPN; Occupational Therapy Assistant: Adore Bucio; Patient Care Assistant: Anusha Umaña; Patient Care Assistant: Lexie Terrell; Nurse Manager: Paulo Mathew RN; Patient Care Assistant: Allyson Myrick    Diagnosis: Active Problems:    Acquired hypothyroidism    Hyperlipemia    Medical clearance for psychiatric admission    Gastroesophageal reflux disease without esophagitis    Diabetes mellitus type 2 in [facility])    Primary hypertension    Cigarette smoker      Patient Strengths/Assets: ability for insight, cooperative, communication skills, compliant with medication, patient is on a voluntary commitment, patient is willing to work on problems, reasoning ability    Patient Barriers/Limitations: difficulty adapting, financial instability, lack of social/family support, lack of stable employment, limited motivation, marital/family conflict, poor self-care, self-care deficit    Short Term Goals: decrease in depressive symptoms, decrease in anxiety symptoms, decrease in suicidal thoughts, ability to stay safe on the unit, improvement in insight, improvement in reasoning ability, improvement in self care, sleep improvement, improvement in appetite, mood stabilization, increase in group attendance, increase in socialization with peers on the unit, acceptance of need for psychiatric treatment    Long Term Goals: improvement in depression, improvement in anxiety, stabilization of mood, free of suicidal thoughts, improved insight, able to express basic needs, acceptance of psychiatric diagnosis, adequate self care, adequate sleep, adequate appetite, adequate oral intake, appropriate interaction with peers, appropriate interaction with family, stable living arrangements upon discharge    Progress Towards Goals: starting psychiatric medications as prescribed, progressing    Recommended Treatment: medication management, patient medication education, group therapy, milieu therapy, continued Behavioral Health psychiatric evaluation/assessment process    Treatment Frequency: daily medication monitoring, group and milieu therapy daily, monitoring through interdisciplinary rounds, monitoring through weekly patient care conferences    Expected Discharge Date:   to be determined    Discharge Plan: discharge to home, referrals as indicated    Treatment Plan Created/Updated By: Lisa Funk MD

## 2022-11-03 NOTE — NURSING NOTE
Pt refused HS snack but did drink 240cc of Apple juice d/t receiving Lantus 30 at HS and having a blood sugar of 105 at HS

## 2022-11-03 NOTE — NURSING NOTE
Patient signed her 72 hrs today at [phone] r/t her finances  Patient states she has bills to pay, cannot stay too long in hospital  Patient is alert and oriented, visible, and pleasant on approach  Patient reports anxiety 3/4, PRN ativan given, effective  Patient is medication, and meals compliant  Patient denies any needs at the moment

## 2022-11-04 ENCOUNTER — TELEPHONE (OUTPATIENT)
Dept: PSYCHIATRY | Facility: CLINIC | Age: 57
End: 2022-11-04

## 2022-11-04 LAB
GLUCOSE SERPL-MCNC: 120 MG/DL (ref 65–140)
GLUCOSE SERPL-MCNC: 133 MG/DL (ref 65–140)
GLUCOSE SERPL-MCNC: 173 MG/DL (ref 65–140)
GLUCOSE SERPL-MCNC: 194 MG/DL (ref 65–140)

## 2022-11-04 RX ORDER — LANOLIN ALCOHOL/MO/W.PET/CERES
6 CREAM (GRAM) TOPICAL
Status: DISCONTINUED | OUTPATIENT
Start: 2022-11-04 | End: 2022-11-08 | Stop reason: HOSPADM

## 2022-11-04 RX ORDER — CLONAZEPAM 0.5 MG/1
0.5 TABLET ORAL 2 TIMES DAILY PRN
Status: DISCONTINUED | OUTPATIENT
Start: 2022-11-04 | End: 2022-11-08 | Stop reason: HOSPADM

## 2022-11-04 RX ORDER — ARIPIPRAZOLE 10 MG/1
10 TABLET ORAL DAILY
Status: DISCONTINUED | OUTPATIENT
Start: 2022-11-05 | End: 2022-11-08 | Stop reason: HOSPADM

## 2022-11-04 RX ADMIN — INSULIN LISPRO 2 UNITS: 100 INJECTION, SOLUTION INTRAVENOUS; SUBCUTANEOUS at 12:13

## 2022-11-04 RX ADMIN — TRAZODONE HYDROCHLORIDE 200 MG: 100 TABLET ORAL at 21:57

## 2022-11-04 RX ADMIN — GABAPENTIN 800 MG: 400 CAPSULE ORAL at 21:57

## 2022-11-04 RX ADMIN — NICOTINE POLACRILEX 2 MG: 2 GUM, CHEWING BUCCAL at 12:23

## 2022-11-04 RX ADMIN — HYDROCHLOROTHIAZIDE 12.5 MG: 12.5 TABLET ORAL at 08:19

## 2022-11-04 RX ADMIN — INSULIN GLARGINE 25 UNITS: 100 INJECTION, SOLUTION SUBCUTANEOUS at 21:59

## 2022-11-04 RX ADMIN — NICOTINE POLACRILEX 2 MG: 2 GUM, CHEWING BUCCAL at 09:06

## 2022-11-04 RX ADMIN — PANTOPRAZOLE SODIUM 40 MG: 40 TABLET, DELAYED RELEASE ORAL at 06:04

## 2022-11-04 RX ADMIN — LORAZEPAM 0.5 MG: 0.5 TABLET ORAL at 12:23

## 2022-11-04 RX ADMIN — GABAPENTIN 800 MG: 400 CAPSULE ORAL at 08:19

## 2022-11-04 RX ADMIN — BENZTROPINE MESYLATE 1 MG: 1 TABLET ORAL at 08:19

## 2022-11-04 RX ADMIN — ATORVASTATIN CALCIUM 20 MG: 20 TABLET, FILM COATED ORAL at 17:14

## 2022-11-04 RX ADMIN — METOPROLOL TARTRATE 50 MG: 50 TABLET, FILM COATED ORAL at 21:57

## 2022-11-04 RX ADMIN — ARIPIPRAZOLE 5 MG: 5 TABLET ORAL at 08:19

## 2022-11-04 RX ADMIN — LEVOTHYROXINE SODIUM 25 MCG: 25 TABLET ORAL at 06:04

## 2022-11-04 RX ADMIN — METFORMIN HYDROCHLORIDE 1000 MG: 500 TABLET ORAL at 17:15

## 2022-11-04 RX ADMIN — METFORMIN HYDROCHLORIDE 1000 MG: 500 TABLET ORAL at 08:20

## 2022-11-04 RX ADMIN — GABAPENTIN 800 MG: 400 CAPSULE ORAL at 17:14

## 2022-11-04 RX ADMIN — INSULIN LISPRO 1 UNITS: 100 INJECTION, SOLUTION INTRAVENOUS; SUBCUTANEOUS at 22:24

## 2022-11-04 RX ADMIN — METOPROLOL TARTRATE 50 MG: 50 TABLET, FILM COATED ORAL at 08:18

## 2022-11-04 NOTE — PROGRESS NOTES
Pt  Has been designated as a high risk for readmission patient and will be seen for 1-1 sessions  Pt  unavailable to complete the DERS-18 but will attempt again in next session and to assess pts  emotional awareness

## 2022-11-04 NOTE — ASSESSMENT & PLAN NOTE
Patient is returning call to [facility], Please call pt back. If during the week prefers a call after 2pm.   · Continue Home medication: levothyroxine

## 2022-11-04 NOTE — PROGRESS NOTES
11/04/22 1000 11/04/22 1030 11/04/22 1300   Activity/Group Checklist   Group Community meeting Admission/Discharge  (completed self assessment interivew ) Wellness   Attendance Attended Attended Other (Comment)  (Pt  with ,at present )   Attendance Duration (min) 16-30 0-15  --    Interactions Interacted appropriately Interacted appropriately  (Pt reports that no one understands how she feels about loosing her Dad  Pt  desires to learn more about her illness and meds  )  --    Affect/Mood Constricted Appropriate  --    Goals Achieved Able to listen to others Able to engage in interactions; Able to self-disclose; Identified feelings; Identified triggers  --

## 2022-11-04 NOTE — CMS CERTIFICATION NOTE
Recertification: Based upon physical, mental and social evaluations, I certify that inpatient psychiatric services continue to be medically necessary for this patient for a duration of 12 midnights for the treatment of  Bipolar disorder current episode depressed ([facility] [address] )

## 2022-11-04 NOTE — PROGRESS NOTES
Progress Note - Behavioral Health   Jennifer Mart 56 y o  female MRN: [id number]  Unit/Bed#: [facility] 852-05 Encounter: [id number]    The patient was seen for continuing care and reviewed with treatment team  Patient signed a 72 Hour notice yesterday which she rescinded today  She complains about needing to leave because she has to pay her bill  Her nurse will help her get her Phone an make the required payments  Pt is in agreement  Patient continues to endorses depressed mood, negative and ruminative thoughts, she feels tired  She acknowledges need to adjust her current medication doses and will be getting increased dose of Abilify 10mg daily tomorrow  /73 (BP Location: Left arm)   Pulse 78   Temp 97 6 °F (36 4 °C) (Temporal)   Resp 18   Ht 5' 3" (1 6 m)   Wt 86 4 kg (190 lb 7 6 oz)   SpO2 95%   BMI 33 74 kg/m²     Current Mental Status Evaluation:  Appearance:  Marginal/poor hygiene, older than stated age and Poor eye contact   Behavior:  calm, cooperative, friendly and psychomotor retardation   Mood:  Depressed and Anxious   Affect: constricted   Speech: Soft and Slow   Thought Process:  Poverty of thoughts   Thought Content:  Does not verbalize delusional material   Perceptual Disturbances: Denies hallucinations and does not appear to be responding to internal stimuli   Risk Potential: No suicidal or homicidal ideation   Orientation:   Patient is alert and awake, oriented x 3   Patient has limited insight, judgement and impulse control  Progress Toward Goals: More compliant with treatment plan, Slowly improving  Increase Abilify to 10mg daily   Principal Problem:    Bipolar disorder current episode depressed ([address] )  Active Problems:    Acquired hypothyroidism    Hyperlipemia    Medical clearance for psychiatric admission    Gastroesophageal reflux disease without esophagitis    Diabetes mellitus type 2 in obese [facility])    Primary hypertension    Cigarette smoker    Discharge planning update: The patient will return to previous living arrangement    Recommended Treatment: Continue with pharmacotherapy, group therapy, milieu therapy and occupational therapy    The patient will be maintained on the following medications:  Current Facility-Administered Medications   Medication Dose Route Frequency Provider Last Rate   • acetaminophen  650 mg Oral Q4H PRN Audrey Leong MD     • acetaminophen  650 mg Oral Q4H PRN Audrey Leong MD     • acetaminophen  975 mg Oral Q6H PRN Audrey Leong MD     • ARIPiprazole  5 mg Oral Daily Audrey Leong MD     • atorvastatin  20 mg Oral Daily With Michelle Morin MD     • benztropine  1 mg Oral Daily Audrey Leong MD     • clonazePAM  0 5 mg Oral BID PRN Audrey Leong MD     • gabapentin  800 mg Oral TID Audrey Leong MD     • hydrochlorothiazide  12 5 mg Oral Daily Gerald Morel PA-C     • insulin glargine  25 Units Subcutaneous HS Sarah Boyle PA-C     • insulin lispro  1-5 Units Subcutaneous HS Gerald Morel PA-C     • insulin lispro  1-6 Units Subcutaneous TID AC Gerald Morel PA-C     • levothyroxine  25 mcg Oral Early Morning Audrey Leong MD     • LORazepam  1 mg Intramuscular Q6H PRN Max 3/day Audrey Leong MD     • LORazepam  0 5 mg Oral Q6H PRN Max 4/day Audrey Leong MD     • LORazepam  1 mg Oral Q6H PRN Max 3/day Audrey Leong MD     • metFORMIN  1,000 mg Oral BID With Meals Audrey Leong MD     • metoprolol tartrate  50 mg Oral Q12H [address] Gerald Morel PA-C     • nicotine polacrilex  2 mg Oral Q2H PRN Gerald Morel PA-C     • OLANZapine  5 mg Intramuscular Q3H PRN Max 3/day Audrey Leong MD     • OLANZapine  2 5 mg Oral Q4H PRN Max 6/day Audrey Leong MD     • OLANZapine  5 mg Oral Q4H PRN Max 3/day Audrey Leong MD     • OLANZapine  5 mg Oral Q3H PRN Max 3/day Audrey Leong MD     • pantoprazole  40 mg Oral Daily Before Breakfast Audrey Leong MD     • senna-docusate sodium  1 tablet Oral Daily PRN Darrell Sanchez Prince Chris MD     • traZODone  200 mg Oral HS Marquis Yelena MD

## 2022-11-04 NOTE — NURSING NOTE
Patient continues to reports anxiety, and requested PRN  PRN ativan given at 1223  Patient is visible, and social, medication, and meals compliant  Patient in bed at the moment, appears to sleep

## 2022-11-04 NOTE — NURSING NOTE
Pt resting in bed at start of shift then out for snack  Pt rates depression 9/10 and anxiety 3/4  Denies other s/s at this time  Pt polite and pleasant during interaction

## 2022-11-04 NOTE — PLAN OF CARE
Pt ,joining groups as able today    Problem: Ineffective Coping  Goal: Participates in unit activities  Description: Interventions:  - Provide therapeutic environment   - Provide required programming   - Redirect inappropriate behaviors   Outcome: Progressing

## 2022-11-04 NOTE — PLAN OF CARE
Problem: Ineffective Coping  Goal: Identifies ineffective coping skills  Outcome: Progressing  Goal: Identifies healthy coping skills  Outcome: Progressing  Goal: Demonstrates healthy coping skills  Outcome: Progressing  Goal: Patient/Family verbalizes awareness of resources  Outcome: Progressing  Goal: Understands least restrictive measures  Description: Interventions:  - Utilize least restrictive behavior  Outcome: Progressing  Goal: Free from restraint events  Description: - Utilize least restrictive measures   - Provide behavioral interventions   - Redirect inappropriate behaviors   Outcome: Progressing     Problem: Risk for Self Injury/Neglect  Goal: Treatment Goal: Remain safe during length of stay, learn and adopt new coping skills, and be free of self-injurious ideation, impulses and acts at the time of discharge  Outcome: Progressing  Goal: Verbalize thoughts and feelings  Description: Interventions:  - Assess and re-assess patient's lethality and potential for self-injury  - Engage patient in 1:1 interactions, daily, for a minimum of 15 minutes  - Encourage patient to express feelings, fears, frustrations, hopes  - Establish rapport/trust with patient   Outcome: Progressing  Goal: Refrain from harming self  Description: Interventions:  - Monitor patient closely, per order  - Develop a trusting relationship  - Supervise medication ingestion, monitor effects and side effects   Outcome: Progressing  Goal: Recognize maladaptive responses and adopt new coping mechanisms  Outcome: Progressing     Problem: Depression  Goal: Treatment Goal: Demonstrate behavioral control of depressive symptoms, verbalize feelings of improved mood/affect, and adopt new coping skills prior to discharge  Outcome: Progressing  Goal: Refrain from harming self  Description: Interventions:  - Monitor patient closely, per order   - Supervise medication ingestion, monitor effects and side effects   Outcome: Progressing  Goal: Refrain from isolation  Description: Interventions:  - Develop a trusting relationship   - Encourage socialization   Outcome: Progressing  Goal: Refrain from self-neglect  Outcome: Progressing     Problem: Anxiety  Goal: Anxiety is at manageable level  Description: Interventions:  - Assess and monitor patient's anxiety level  - Monitor for signs and symptoms (heart palpitations, chest pain, shortness of breath, headaches, nausea, feeling jumpy, restlessness, irritable, apprehensive)  - Collaborate with interdisciplinary team and initiate plan and interventions as ordered    - [place] patient to unit/surroundings  - Explain treatment plan  - Encourage participation in care  - Encourage verbalization of concerns/fears  - Identify coping mechanisms  - Assist in developing anxiety-reducing skills  - Administer/offer alternative therapies  - Limit or eliminate stimulants  Outcome: Progressing

## 2022-11-04 NOTE — CASE MANAGEMENT
11/04/22 0850   Team Meeting   Meeting Type Daily Rounds   Initial Conference Date 11/04/22   Team Members Present   Team Members Present Physician;Nurse;;; Occupational Therapist   Physician Team Member Dr Mini Chandra Management Team Member [facility] Work Team Member Destinee   OT Team Member Luz Maria Ozuna   Patient/Family Present   Patient Present No   Patient's Family Present No     9/10 depression, 3/4 anxiety, denies s/s, pleasant, signed 72 hr notice yesterday, visible, slept, good appetite

## 2022-11-04 NOTE — TREATMENT TEAM
11/04/22 1030   Team Meeting   Meeting Type Tx Team Meeting   Initial Conference Date 11/04/22   Team Members Present   Team Members Present Physician;Nurse;   Physician Team Member Dr Nanda Starkey Team Member [facility] Management Team Member Julee   Patient/Family Present   Patient Present Yes   Patient's Family Present No     Treatment goals include: remaining safe on unit, building effective coping skills, decreasing depression/anxiety, discharge planning

## 2022-11-04 NOTE — CASE MANAGEMENT
In-basket message sent to [facility] to find out if pt can still be seen for OP psych services since she had multiple no-shows prior to admission  Waiting on response

## 2022-11-05 LAB
GLUCOSE SERPL-MCNC: 110 MG/DL (ref 65–140)
GLUCOSE SERPL-MCNC: 134 MG/DL (ref 65–140)
GLUCOSE SERPL-MCNC: 162 MG/DL (ref 65–140)
GLUCOSE SERPL-MCNC: 167 MG/DL (ref 65–140)

## 2022-11-05 RX ORDER — NICOTINE 21 MG/24HR
21 PATCH, TRANSDERMAL 24 HOURS TRANSDERMAL DAILY
Status: DISCONTINUED | OUTPATIENT
Start: 2022-11-05 | End: 2022-11-08 | Stop reason: HOSPADM

## 2022-11-05 RX ADMIN — GABAPENTIN 800 MG: 400 CAPSULE ORAL at 17:31

## 2022-11-05 RX ADMIN — LEVOTHYROXINE SODIUM 25 MCG: 25 TABLET ORAL at 06:32

## 2022-11-05 RX ADMIN — INSULIN LISPRO 1 UNITS: 100 INJECTION, SOLUTION INTRAVENOUS; SUBCUTANEOUS at 12:41

## 2022-11-05 RX ADMIN — TRAZODONE HYDROCHLORIDE 200 MG: 100 TABLET ORAL at 21:45

## 2022-11-05 RX ADMIN — PANTOPRAZOLE SODIUM 40 MG: 40 TABLET, DELAYED RELEASE ORAL at 06:32

## 2022-11-05 RX ADMIN — ATORVASTATIN CALCIUM 20 MG: 20 TABLET, FILM COATED ORAL at 17:31

## 2022-11-05 RX ADMIN — GABAPENTIN 800 MG: 400 CAPSULE ORAL at 08:32

## 2022-11-05 RX ADMIN — INSULIN LISPRO 1 UNITS: 100 INJECTION, SOLUTION INTRAVENOUS; SUBCUTANEOUS at 21:50

## 2022-11-05 RX ADMIN — METFORMIN HYDROCHLORIDE 1000 MG: 500 TABLET ORAL at 08:33

## 2022-11-05 RX ADMIN — INSULIN GLARGINE 25 UNITS: 100 INJECTION, SOLUTION SUBCUTANEOUS at 21:48

## 2022-11-05 RX ADMIN — NICOTINE 21 MG: 21 PATCH, EXTENDED RELEASE TRANSDERMAL at 12:41

## 2022-11-05 RX ADMIN — ARIPIPRAZOLE 10 MG: 10 TABLET ORAL at 08:33

## 2022-11-05 RX ADMIN — GABAPENTIN 800 MG: 400 CAPSULE ORAL at 21:45

## 2022-11-05 RX ADMIN — METFORMIN HYDROCHLORIDE 1000 MG: 500 TABLET ORAL at 17:31

## 2022-11-05 RX ADMIN — BENZTROPINE MESYLATE 1 MG: 1 TABLET ORAL at 08:32

## 2022-11-05 RX ADMIN — CLONAZEPAM 0.5 MG: 0.5 TABLET ORAL at 12:51

## 2022-11-05 NOTE — NURSING NOTE
Pt is calm and cooperative  Pt is med and meal compliant  Pt is visible in the milieu conversing with peers at times  Pt is pleasant on approach  Pt denies all signs and symptoms at the initial approach  Pt later reported increasing anxiety and asked for medication intervention  Pt received klonopin for anxiety  Medication was effective  Pt will be monitored frequently

## 2022-11-05 NOTE — PLAN OF CARE
Problem: Ineffective Coping  Goal: Identifies healthy coping skills  Outcome: Progressing     Problem: Risk for Self Injury/Neglect  Goal: Treatment Goal: Remain safe during length of stay, learn and adopt new coping skills, and be free of self-injurious ideation, impulses and acts at the time of discharge  Outcome: Progressing  Goal: Verbalize thoughts and feelings  Description: Interventions:  - Assess and re-assess patient's lethality and potential for self-injury  - Engage patient in 1:1 interactions, daily, for a minimum of 15 minutes  - Encourage patient to express feelings, fears, frustrations, hopes  - Establish rapport/trust with patient   Outcome: Progressing  Goal: Refrain from harming self  Description: Interventions:  - Monitor patient closely, per order  - Develop a trusting relationship  - Supervise medication ingestion, monitor effects and side effects   Outcome: Progressing  Goal: Attend and participate in unit activities, including therapeutic, recreational, and educational groups  Description: Interventions:  - Provide therapeutic and educational activities daily, encourage attendance and participation, and document same in the medical record  - Obtain collateral information, encourage visitation and family involvement in care   Outcome: Progressing  Goal: Recognize maladaptive responses and adopt new coping mechanisms  Outcome: Progressing  Goal: Complete daily ADLs, including personal hygiene independently, as able  Description: Interventions:  - Observe, teach, and assist patient with ADLS  - Monitor and promote a balance of rest/activity, with adequate nutrition and elimination  Outcome: Progressing

## 2022-11-05 NOTE — PLAN OF CARE
Problem: SAFETY ADULT  Goal: Patient will remain free of falls  Description: INTERVENTIONS:  - Educate patient/family on patient safety including physical limitations  - Instruct patient to call for assistance with activity   - Consult OT/PT to assist with strengthening/mobility   - Keep Call bell within reach  - Keep bed low and locked with side rails adjusted as appropriate  - Keep care items and personal belongings within reach  - Initiate and maintain comfort rounds  - Make Fall Risk Sign visible to staff  - Offer Toileting every 2 Hours, in advance of need  - Initiate/Maintain bed alarm  - Obtain necessary fall risk management equipment:  - Apply yellow socks and bracelet for high fall risk patients  - Consider moving patient to room near nurses station  Outcome: Progressing  Goal: Maintain or return to baseline ADL function  Description: INTERVENTIONS:  -  Assess patient's ability to carry out ADLs; assess patient's baseline for ADL function and identify physical deficits which impact ability to perform ADLs (bathing, care of mouth/teeth, toileting, grooming, dressing, etc )  - Assess/evaluate cause of self-care deficits   - Assess range of motion  - Assess patient's mobility; develop plan if impaired  - Assess patient's need for assistive devices and provide as appropriate  - Encourage maximum independence but intervene and supervise when necessary  - Involve family in performance of ADLs  - Assess for home care needs following discharge   - Consider OT consult to assist with ADL evaluation and planning for discharge  - Provide patient education as appropriate  Outcome: Progressing     Problem: Ineffective Coping  Goal: Cooperates with admission process  Description: Interventions:   - Complete admission process  Outcome: Progressing  Goal: Identifies ineffective coping skills  Outcome: Progressing  Goal: Identifies healthy coping skills  Outcome: Progressing  Goal: Demonstrates healthy coping skills  Outcome: Progressing  Goal: Participates in unit activities  Description: Interventions:  - Provide therapeutic environment   - Provide required programming   - Redirect inappropriate behaviors   Outcome: Progressing  Goal: Patient/Family participate in treatment and DC plans  Description: Interventions:  - Provide therapeutic environment  Outcome: Progressing  Goal: Patient/Family verbalizes awareness of resources  Outcome: Progressing  Goal: Understands least restrictive measures  Description: Interventions:  - Utilize least restrictive behavior  Outcome: Progressing  Goal: Free from restraint events  Description: - Utilize least restrictive measures   - Provide behavioral interventions   - Redirect inappropriate behaviors   Outcome: Progressing     Problem: Risk for Self Injury/Neglect  Goal: Treatment Goal: Remain safe during length of stay, learn and adopt new coping skills, and be free of self-injurious ideation, impulses and acts at the time of discharge  Outcome: Progressing  Goal: Verbalize thoughts and feelings  Description: Interventions:  - Assess and re-assess patient's lethality and potential for self-injury  - Engage patient in 1:1 interactions, daily, for a minimum of 15 minutes  - Encourage patient to express feelings, fears, frustrations, hopes  - Establish rapport/trust with patient   Outcome: Progressing  Goal: Refrain from harming self  Description: Interventions:  - Monitor patient closely, per order  - Develop a trusting relationship  - Supervise medication ingestion, monitor effects and side effects   Outcome: Progressing  Goal: Attend and participate in unit activities, including therapeutic, recreational, and educational groups  Description: Interventions:  - Provide therapeutic and educational activities daily, encourage attendance and participation, and document same in the medical record  - Obtain collateral information, encourage visitation and family involvement in care Outcome: Progressing  Goal: Recognize maladaptive responses and adopt new coping mechanisms  Outcome: Progressing  Goal: Complete daily ADLs, including personal hygiene independently, as able  Description: Interventions:  - Observe, teach, and assist patient with ADLS  - Monitor and promote a balance of rest/activity, with adequate nutrition and elimination  Outcome: Progressing     Problem: Depression  Goal: Treatment Goal: Demonstrate behavioral control of depressive symptoms, verbalize feelings of improved mood/affect, and adopt new coping skills prior to discharge  Outcome: Progressing  Goal: Verbalize thoughts and feelings  Description: Interventions:  - Assess and re-assess patient's level of risk   - Engage patient in 1:1 interactions, daily, for a minimum of 15 minutes   - Encourage patient to express feelings, fears, frustrations, hopes   Outcome: Progressing  Goal: Refrain from harming self  Description: Interventions:  - Monitor patient closely, per order   - Supervise medication ingestion, monitor effects and side effects   Outcome: Progressing  Goal: Refrain from isolation  Description: Interventions:  - Develop a trusting relationship   - Encourage socialization   Outcome: Progressing  Goal: Refrain from self-neglect  Outcome: Progressing  Goal: Attend and participate in unit activities, including therapeutic, recreational, and educational groups  Description: Interventions:  - Provide therapeutic and educational activities daily, encourage attendance and participation, and document same in the medical record   Outcome: Progressing  Goal: Complete daily ADLs, including personal hygiene independently, as able  Description: Interventions:  - Observe, teach, and assist patient with ADLS  -  Monitor and promote a balance of rest/activity, with adequate nutrition and elimination   Outcome: Progressing     Problem: Anxiety  Goal: Anxiety is at manageable level  Description: Interventions:  - Assess and monitor patient's anxiety level  - Monitor for signs and symptoms (heart palpitations, chest pain, shortness of breath, headaches, nausea, feeling jumpy, restlessness, irritable, apprehensive)  - Collaborate with interdisciplinary team and initiate plan and interventions as ordered    - [place] patient to unit/surroundings  - Explain treatment plan  - Encourage participation in care  - Encourage verbalization of concerns/fears  - Identify coping mechanisms  - Assist in developing anxiety-reducing skills  - Administer/offer alternative therapies  - Limit or eliminate stimulants  Outcome: Progressing

## 2022-11-05 NOTE — NURSING NOTE
Patient visible in milieu pleasant and cooperative on approach  Denies SI/HI/AVH  Compliant with medications  No other issues noted this shift   Q 7 minutes safety checks maintained

## 2022-11-05 NOTE — PROGRESS NOTES
Progress Note - Behavioral Health   Tristen Moses 56 y o  female MRN: [id number]  Unit/Bed#: LVDEW 604-83 Encounter: [id number]       Patient was visited on unit for continuing care; chart reviewed and discussed with the nursing staff  On approach, the patient was calm and cooperative  She reported drinking a lot of coffee at home even late evening, and c/o having cravings for coffee last night while woke up in the middle of the night  She talked about her stressors at home, and her concerns regarding her father dealing with cancer  Reported feeling better since being in the hospital and less stressed out  Denied any changes in her appetite, and energy level  Denied A/VH currently  Denied SI/HI, intent or plan upon direct inquiry at this time  Patient continues to be social and interactive with staff and peers  No reports of aggression or self-injurious behavior on unit  Received Klonopin 0 5 mg PRN at 1251 PM for anxiety  Patient accepted all offered medications and reported feeling better  No adverse effects of medications noted or reported          Current Mental Status Evaluation:  Appearance and attitude: appeared as stated age, casually dressed, with good hygiene  Eye contact: good  Motor Function: within normal limits, intact gait, No PMA/PMR  Gait/station: Not observed  Speech: normal for rate, rhythm, volume, latency, amount  Language: No overt abnormality  Mood/affect: "good"; apeared anxious / Affect was constricted but reactive, mood congruent  Thought Processes: circumstantial  Thought content: denied suicidal ideations or homicidal ideations, no overt delusions elicited  Associations: circumstantial associations  Perceptual disturbances: denies Auditory/Visual/Tactile Hallucinations  Orientation: oriented to time, person, place and to the situational context  Cognitive Function: intact  Memory: recent and remote memory grossly intact  Intellect: average  Fund of knowledge: aware of current events, aware of past history and vocabulary average  Impulse control: good  Insight/judgment: fair/fair    Pain: denied  Pain scale: 0    Vital signs in last 24 hours:    Temp:  [97 2 °F (36 2 °C)-98 °F (36 7 °C)] 97 2 °F (36 2 °C)  HR:  [68-74] 74  Resp:  [16-18] 16  BP: (109-124)/(68-72) 109/68    Laboratory results: I have personally reviewed all pertinent laboratory/tests results    Results from the past 24 hours:   Recent Results (from the past 24 hour(s))   Fingerstick Glucose (POCT)    Collection Time: 11/04/22  4:18 PM   Result Value Ref Range    POC Glucose 133 65 - 140 mg/dl   Fingerstick Glucose (POCT)    Collection Time: 11/04/22  9:24 PM   Result Value Ref Range    POC Glucose 173 (H) 65 - 140 mg/dl   Fingerstick Glucose (POCT)    Collection Time: 11/05/22  8:16 AM   Result Value Ref Range    POC Glucose 134 65 - 140 mg/dl   Fingerstick Glucose (POCT)    Collection Time: 11/05/22 12:18 PM   Result Value Ref Range    POC Glucose 162 (H) 65 - 140 mg/dl       Progress Toward Goals: some improvement    Assessment:  Principal Problem:    Bipolar disorder current episode depressed ([facility] 75 )  Active Problems:    Acquired hypothyroidism    Hyperlipemia    Medical clearance for psychiatric admission    Gastroesophageal reflux disease without esophagitis    Diabetes mellitus type 2 in obese ([place] [address] )    Primary hypertension    Cigarette smoker        Plan:  - f/u SLIM recs regarding the medical problems  - Continue medication titration and treatment plan; adjust medication to optimize treatment response and as clinically indicated       Scheduled medications:  Current Facility-Administered Medications   Medication Dose Route Frequency Provider Last Rate   • acetaminophen  650 mg Oral Q4H PRN Marry Clayton MD     • acetaminophen  650 mg Oral Q4H PRN Marry Clayton MD     • acetaminophen  975 mg Oral Q6H PRN Marry Clayton MD     • ARIPiprazole  10 mg Oral Daily Marry Clayton MD     • atorvastatin  20 mg Oral Daily With Gregory Mckeon MD     • benztropine  1 mg Oral Daily Anita Mcdowell MD     • clonazePAM  0 5 mg Oral BID PRN Anita Mcdowell MD     • gabapentin  800 mg Oral TID Anita Mcdowell MD     • hydrochlorothiazide  12 5 mg Oral Daily Metta Million, PA-C     • insulin glargine  25 Units Subcutaneous HS Sarah Stephenson Fariha, PA-C     • insulin lispro  1-5 Units Subcutaneous HS Metta Million, PA-C     • insulin lispro  1-6 Units Subcutaneous TID AC Urmila Olivas, PA-C     • levothyroxine  25 mcg Oral Early Morning Anita Mcdowell MD     • LORazepam  1 mg Intramuscular Q6H PRN Max 3/day Anita Mcdowell MD     • LORazepam  0 5 mg Oral Q6H PRN Max 4/day Anita Mcdowell MD     • LORazepam  1 mg Oral Q6H PRN Max 3/day Anita Mcdowell MD     • melatonin  6 mg Oral HS PRN Anita Mcdowell MD     • metFORMIN  1,000 mg Oral BID With Meals Anita Mcdowell MD     • metoprolol tartrate  50 mg Oral Q12H [address] Metta Million, PA-C     • nicotine  21 mg Transdermal Daily Metdereje Million, PA-C     • nicotine polacrilex  2 mg Oral Q2H PRN Urmila Olivas, PA-C     • OLANZapine  5 mg Intramuscular Q3H PRN Max 3/day Anita Mcdowell MD     • OLANZapine  2 5 mg Oral Q4H PRN Max 6/day Anita Mcdowell MD     • OLANZapine  5 mg Oral Q4H PRN Max 3/day Anita Mcdowell MD     • OLANZapine  5 mg Oral Q3H PRN Max 3/day Anita Mcdowell MD     • pantoprazole  40 mg Oral Daily Before Breakfast Anita Mcdowell MD     • senna-docusate sodium  1 tablet Oral Daily PRN Anita Mcdowell MD     • traZODone  200 mg Oral HS Anita Mcdowell MD          PRN:  •  acetaminophen  •  acetaminophen  •  acetaminophen  •  clonazePAM  •  LORazepam  •  LORazepam  •  LORazepam  •  melatonin  •  nicotine polacrilex  •  OLANZapine  •  OLANZapine  •  OLANZapine  •  OLANZapine  •  senna-docusate sodium    - Observation: routine    - VS: as per unit protocol  - Diet: Regular diet  - Psychoeducation (benefits and potential risks) discussed, importance of compliance with the psychiatric treatment reiterated, and the patient verbalized understanding of the matter  - Encourage group attendance and milieu therapy    - The pt was educated and agreed to verbalize any suicidal thoughts, frustrations or concerns to the nursing staff, immediately  - Dispo: TBD       Next of Kin  · Extended Emergency Contact Information  · Primary Emergency Contact: Rubén Smith  ·  [place]  · Home Phone: [phone]  · Work Phone: [phone]  · Mobile Phone: [phone]  · Relation: Son  · Secondary Emergency Contact: Greyson Smith  · Mobile Phone: [phone]  · Relation: Son      Counseling / Coordination of Care     Total floor / unit time spent today 20 minutes  Greater than 50% of total time was spent with the patient and / or family counseling and / or coordination of care  A description of the counseling / coordination of care  Patient's Rights, confidentiality and exceptions to confidentiality, use of automated medical record, [facility] staff access to medical record, and consent to treatment reviewed      Mary Alice Crisostomo MD  Attending [address]

## 2022-11-06 LAB
GLUCOSE SERPL-MCNC: 120 MG/DL (ref 65–140)
GLUCOSE SERPL-MCNC: 126 MG/DL (ref 65–140)
GLUCOSE SERPL-MCNC: 131 MG/DL (ref 65–140)
GLUCOSE SERPL-MCNC: 139 MG/DL (ref 65–140)

## 2022-11-06 RX ADMIN — METOPROLOL TARTRATE 50 MG: 50 TABLET, FILM COATED ORAL at 09:08

## 2022-11-06 RX ADMIN — HYDROCHLOROTHIAZIDE 12.5 MG: 12.5 TABLET ORAL at 09:08

## 2022-11-06 RX ADMIN — LEVOTHYROXINE SODIUM 25 MCG: 25 TABLET ORAL at 06:33

## 2022-11-06 RX ADMIN — METOPROLOL TARTRATE 50 MG: 50 TABLET, FILM COATED ORAL at 21:38

## 2022-11-06 RX ADMIN — NICOTINE 21 MG: 21 PATCH, EXTENDED RELEASE TRANSDERMAL at 09:08

## 2022-11-06 RX ADMIN — ARIPIPRAZOLE 10 MG: 10 TABLET ORAL at 09:08

## 2022-11-06 RX ADMIN — BENZTROPINE MESYLATE 1 MG: 1 TABLET ORAL at 09:08

## 2022-11-06 RX ADMIN — PANTOPRAZOLE SODIUM 40 MG: 40 TABLET, DELAYED RELEASE ORAL at 06:33

## 2022-11-06 RX ADMIN — INSULIN GLARGINE 25 UNITS: 100 INJECTION, SOLUTION SUBCUTANEOUS at 21:37

## 2022-11-06 RX ADMIN — TRAZODONE HYDROCHLORIDE 200 MG: 100 TABLET ORAL at 21:38

## 2022-11-06 RX ADMIN — GABAPENTIN 800 MG: 400 CAPSULE ORAL at 09:08

## 2022-11-06 RX ADMIN — ATORVASTATIN CALCIUM 20 MG: 20 TABLET, FILM COATED ORAL at 17:23

## 2022-11-06 RX ADMIN — GABAPENTIN 800 MG: 400 CAPSULE ORAL at 21:38

## 2022-11-06 RX ADMIN — GABAPENTIN 800 MG: 400 CAPSULE ORAL at 17:24

## 2022-11-06 RX ADMIN — METFORMIN HYDROCHLORIDE 1000 MG: 500 TABLET ORAL at 07:37

## 2022-11-06 RX ADMIN — METFORMIN HYDROCHLORIDE 1000 MG: 500 TABLET ORAL at 17:23

## 2022-11-06 NOTE — NURSING NOTE
Patient visible on the unit and social with peers throughout the day  Patient discussed her mounting pressures at home, including her ill father, that led to her suicide attempt  Patient is remorseful regarding the attempt  Patient says anxiety and depression is low and manageable today  Denies SI/HI/AH/VH  Maintained on q7 checks

## 2022-11-06 NOTE — PROGRESS NOTES
Progress Note - Behavioral Health   Jorge Luis Thompson 56 y o  female MRN: [id number]  Unit/Bed#: PWDKO 388-46 Encounter: [id number]       Patient was visited on unit for continuing care; chart reviewed and discussed with the nursing staff  On approach, the patient was calm and cooperative  She endorsed good mood, appetite, and energy level  No problem initiating and maintaining sleep  She reported feeling "bored" being in the hospital and looks forward to go home, but at the same time is afraid of getting frustrated again in the context of stressors at home  She stated that her father is dying due to cancer which makes her upset and frustrated  She usually takes care of her grandkids at home and does not have any "me time"  Denied A/VH currently  Denied SI/HI, intent or plan upon direct inquiry at this time  Patient continues to be interactive with staff and peers  No reports of aggression or self-injurious behavior on unit  No PRN medications used in the past 24 hours except Klonopin 0 5 mg PRN at 1251 yesterday for anxiety  Patient accepted all offered medications and reported feeling better  No adverse effects of medications noted or reported      Current Mental Status Evaluation:  Appearance and attitude: appeared as stated age, casually dressed, with good hygiene  Eye contact: good  Motor Function: within normal limits, intact gait, No PMA/PMR  Gait/station: normal gait/station and normal balance  Speech: normal for rate, rhythm, volume, latency, amount  Language: No overt abnormality  Mood/affect: slightly anxious / Affect was constricted but reactive, mood congruent  Thought Processes: linear  Thought content: denies suicidal ideation or homicidal ideation; no delusions or first rank symptoms  Associations: concrete associations  Perceptual disturbances: denies Auditory/Visual/Tactile Hallucinations  Orientation: oriented to time, person, place and to the situational context  Cognitive Function: intact  Memory: recent and remote memory grossly intact  Intellect: average  Fund of knowledge: aware of current events, aware of past history and vocabulary average  Impulse control: good  Insight/judgment: fair/fair    Pain: denied  Pain scale: 0    Vital signs in last 24 hours:    Temp:  [97 5 °F (36 4 °C)-98 °F (36 7 °C)] 98 °F (36 7 °C)  HR:  [] 95  Resp:  [16] 16  BP: (102-139)/(73-84) 139/84    Laboratory results: I have personally reviewed all pertinent laboratory/tests results    Results from the past 24 hours:   Recent Results (from the past 24 hour(s))   Fingerstick Glucose (POCT)    Collection Time: 11/05/22  4:24 PM   Result Value Ref Range    POC Glucose 110 65 - 140 mg/dl   Fingerstick Glucose (POCT)    Collection Time: 11/05/22  9:39 PM   Result Value Ref Range    POC Glucose 167 (H) 65 - 140 mg/dl   Fingerstick Glucose (POCT)    Collection Time: 11/06/22  7:27 AM   Result Value Ref Range    POC Glucose 120 65 - 140 mg/dl   Fingerstick Glucose (POCT)    Collection Time: 11/06/22 11:28 AM   Result Value Ref Range    POC Glucose 139 65 - 140 mg/dl       Progress Toward Goals: improving    Assessment:  Principal Problem:    Bipolar disorder current episode depressed ([facility] )  Active Problems:    Acquired hypothyroidism    Hyperlipemia    Medical clearance for psychiatric admission    Gastroesophageal reflux disease without esophagitis    Diabetes mellitus type 2 in obese ([facility] 75 )    Primary hypertension    Cigarette smoker        Plan:  - f/u SLIM recs regarding the medical problems  - Continue medication titration and treatment plan; adjust medication to optimize treatment response and as clinically indicated       Scheduled medications:  Current Facility-Administered Medications   Medication Dose Route Frequency Provider Last Rate   • acetaminophen  650 mg Oral Q4H PRN Lisa Aguiar MD     • acetaminophen  650 mg Oral Q4H PRN Lisa Aguiar MD     • acetaminophen  975 mg Oral Q6H PRN Lisa Aguiar MD • ARIPiprazole  10 mg Oral Daily Fracisco Jovel MD     • atorvastatin  20 mg Oral Daily With Manohar Posadas MD     • benztropine  1 mg Oral Daily Fracisco Jovel MD     • clonazePAM  0 5 mg Oral BID PRN Fracisco Jovel MD     • gabapentin  800 mg Oral TID Fracisco Jovel MD     • hydrochlorothiazide  12 5 mg Oral Daily Paula Rodriguez PA-C     • insulin glargine  25 Units Subcutaneous HS Sarah Holt PA-C     • insulin lispro  1-5 Units Subcutaneous HS Paula Rodriguez PA-C     • insulin lispro  1-6 Units Subcutaneous TID AC Paula Rodriguez PA-C     • levothyroxine  25 mcg Oral Early Morning Fracisco Jovel MD     • LORazepam  1 mg Intramuscular Q6H PRN Max 3/day Fracisco Jovel MD     • LORazepam  0 5 mg Oral Q6H PRN Max 4/day Fracisco Jovel MD     • LORazepam  1 mg Oral Q6H PRN Max 3/day Fracisco Jovel MD     • melatonin  6 mg Oral HS PRN Fracisco Jovel MD     • metFORMIN  1,000 mg Oral BID With Meals Fracisco Jovel MD     • metoprolol tartrate  50 mg Oral Q12H [address] Paula Rodriguez PA-C     • nicotine  21 mg Transdermal Daily Paula Rodriguez PA-C     • nicotine polacrilex  2 mg Oral Q2H PRN Paula Rodriguez PA-C     • OLANZapine  5 mg Intramuscular Q3H PRN Max 3/day Fracisco Jovel MD     • OLANZapine  2 5 mg Oral Q4H PRN Max 6/day Fracisco Jovel MD     • OLANZapine  5 mg Oral Q4H PRN Max 3/day Fracisco Jovel MD     • OLANZapine  5 mg Oral Q3H PRN Max 3/day Fracisco Jovel MD     • pantoprazole  40 mg Oral Daily Before Breakfast Fracisco Jovel MD     • senna-docusate sodium  1 tablet Oral Daily PRN Fracisco Jovel MD     • traZODone  200 mg Oral HS Fracisco Jovel MD          PRN:  •  acetaminophen  •  acetaminophen  •  acetaminophen  •  clonazePAM  •  LORazepam  •  LORazepam  •  LORazepam  •  melatonin  •  nicotine polacrilex  •  OLANZapine  •  OLANZapine  •  OLANZapine  •  OLANZapine  •  senna-docusate sodium    - Observation: routine    - VS: as per unit protocol  - Diet: Regular diet  - Psychoeducation (benefits and potential risks) discussed, importance of compliance with the psychiatric treatment reiterated, and the patient verbalized understanding of the matter  - Encourage group attendance and milieu therapy    - The pt was educated and agreed to verbalize any suicidal thoughts, frustrations or concerns to the nursing staff, immediately  - Dispo: TBD       Next of Kin  · Extended Emergency Contact Information  · Primary Emergency Contact: Rubén Smith  ·  [place]  · Home Phone: [phone]  · Work Phone: [phone]  · Mobile Phone: [phone]  · Relation: Son  · Secondary Emergency Contact: Greyson Smith  · Mobile Phone: [phone]  · Relation: Son      Counseling / Coordination of Care     Total floor / unit time spent today 20 minutes  Greater than 50% of total time was spent with the patient and / or family counseling and / or coordination of care  A description of the counseling / coordination of care  Patient's Rights, confidentiality and exceptions to confidentiality, use of automated medical record, [address] [place] [address] staff access to medical record, and consent to treatment reviewed      Amaury Hunter MD  Attending [address]

## 2022-11-06 NOTE — NURSING NOTE
Patient alert, visible in day room  Denies all psych s/s   Very  Pleasant and cooperative  Medication compliant  No other issues reported this shift      Q 7 min safety checks maintained

## 2022-11-06 NOTE — PLAN OF CARE
Problem: SAFETY ADULT  Goal: Patient will remain free of falls  Description: INTERVENTIONS:  - Educate patient/family on patient safety including physical limitations  - Instruct patient to call for assistance with activity   - Consult OT/PT to assist with strengthening/mobility   - Keep Call bell within reach  - Keep bed low and locked with side rails adjusted as appropriate  - Keep care items and personal belongings within reach  - Initiate and maintain comfort rounds  - Make Fall Risk Sign visible to staff  - Apply yellow socks and bracelet for high fall risk patients  - Consider moving patient to room near nurses station  Outcome: Progressing  Goal: Maintain or return to baseline ADL function  Description: INTERVENTIONS:  -  Assess patient's ability to carry out ADLs; assess patient's baseline for ADL function and identify physical deficits which impact ability to perform ADLs (bathing, care of mouth/teeth, toileting, grooming, dressing, etc )  - Assess/evaluate cause of self-care deficits   - Assess range of motion  - Assess patient's mobility; develop plan if impaired  - Assess patient's need for assistive devices and provide as appropriate  - Encourage maximum independence but intervene and supervise when necessary  - Involve family in performance of ADLs  - Assess for home care needs following discharge   - Consider OT consult to assist with ADL evaluation and planning for discharge  - Provide patient education as appropriate  Outcome: Progressing  Goal: Maintains/Returns to pre admission functional level  Description: INTERVENTIONS:  - Perform BMAT or MOVE assessment daily    - Set and communicate daily mobility goal to care team and patient/family/caregiver     - Collaborate with rehabilitation services on mobility goals if consulted  - Out of bed for toileting  - Record patient progress and toleration of activity level   Outcome: Progressing

## 2022-11-06 NOTE — PLAN OF CARE
Problem: Ineffective Coping  Goal: Demonstrates healthy coping skills  Outcome: Progressing     Problem: Ineffective Coping  Goal: Participates in unit activities  Description: Interventions:  - Provide therapeutic environment   - Provide required programming   - Redirect inappropriate behaviors   Outcome: Progressing     Problem: Risk for Self Injury/Neglect  Goal: Treatment Goal: Remain safe during length of stay, learn and adopt new coping skills, and be free of self-injurious ideation, impulses and acts at the time of discharge  Outcome: Progressing     Problem: Depression  Goal: Attend and participate in unit activities, including therapeutic, recreational, and educational groups  Description: Interventions:  - Provide therapeutic and educational activities daily, encourage attendance and participation, and document same in the medical record   Outcome: Progressing

## 2022-11-07 ENCOUNTER — TELEPHONE (OUTPATIENT)
Dept: PSYCHIATRY | Facility: CLINIC | Age: 57
End: 2022-11-07

## 2022-11-07 LAB
GLUCOSE SERPL-MCNC: 109 MG/DL (ref 65–140)
GLUCOSE SERPL-MCNC: 114 MG/DL (ref 65–140)
GLUCOSE SERPL-MCNC: 145 MG/DL (ref 65–140)
GLUCOSE SERPL-MCNC: 92 MG/DL (ref 65–140)

## 2022-11-07 RX ADMIN — CLONAZEPAM 0.5 MG: 0.5 TABLET ORAL at 11:53

## 2022-11-07 RX ADMIN — GABAPENTIN 800 MG: 400 CAPSULE ORAL at 08:41

## 2022-11-07 RX ADMIN — PANTOPRAZOLE SODIUM 40 MG: 40 TABLET, DELAYED RELEASE ORAL at 06:39

## 2022-11-07 RX ADMIN — BENZTROPINE MESYLATE 1 MG: 1 TABLET ORAL at 08:41

## 2022-11-07 RX ADMIN — GABAPENTIN 800 MG: 400 CAPSULE ORAL at 21:23

## 2022-11-07 RX ADMIN — TRAZODONE HYDROCHLORIDE 200 MG: 100 TABLET ORAL at 21:23

## 2022-11-07 RX ADMIN — METOPROLOL TARTRATE 50 MG: 50 TABLET, FILM COATED ORAL at 08:41

## 2022-11-07 RX ADMIN — HYDROCHLOROTHIAZIDE 12.5 MG: 12.5 TABLET ORAL at 08:41

## 2022-11-07 RX ADMIN — METOPROLOL TARTRATE 50 MG: 50 TABLET, FILM COATED ORAL at 21:23

## 2022-11-07 RX ADMIN — ACETAMINOPHEN 975 MG: 325 TABLET ORAL at 20:48

## 2022-11-07 RX ADMIN — CLONAZEPAM 0.5 MG: 0.5 TABLET ORAL at 02:19

## 2022-11-07 RX ADMIN — METFORMIN HYDROCHLORIDE 1000 MG: 500 TABLET ORAL at 17:08

## 2022-11-07 RX ADMIN — METFORMIN HYDROCHLORIDE 1000 MG: 500 TABLET ORAL at 08:41

## 2022-11-07 RX ADMIN — ARIPIPRAZOLE 10 MG: 10 TABLET ORAL at 08:41

## 2022-11-07 RX ADMIN — LEVOTHYROXINE SODIUM 25 MCG: 25 TABLET ORAL at 06:39

## 2022-11-07 RX ADMIN — INSULIN GLARGINE 25 UNITS: 100 INJECTION, SOLUTION SUBCUTANEOUS at 22:32

## 2022-11-07 RX ADMIN — GABAPENTIN 800 MG: 400 CAPSULE ORAL at 17:08

## 2022-11-07 RX ADMIN — ATORVASTATIN CALCIUM 20 MG: 20 TABLET, FILM COATED ORAL at 17:08

## 2022-11-07 RX ADMIN — NICOTINE 21 MG: 21 PATCH, EXTENDED RELEASE TRANSDERMAL at 08:42

## 2022-11-07 NOTE — PLAN OF CARE
Pt  Louise Hires one morning group and did not attend afternoon seated exercise  Problem: Ineffective Coping  Goal: Participates in unit activities  Description: Interventions:  - Provide therapeutic environment   - Provide required programming   - Redirect inappropriate behaviors   Outcome: Progressing

## 2022-11-07 NOTE — PROGRESS NOTES
Pt  Spontaneously engaged in group discussion on signs/symptoms of recovery and stated awareness of having completed a relapse prevention plan prior to today  11/07/22 1000   Activity/Group Checklist   Group Community meeting   Attendance Attended   Attendance Duration (min) 16-30   Interactions Interacted appropriately   Affect/Mood Appropriate;Normal range   Goals Achieved Able to engage in interactions; Able to listen to others;Discussed coping strategies; Identified resources and support systems; Identified feelings

## 2022-11-07 NOTE — NURSING NOTE
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

## 2022-11-07 NOTE — ASSESSMENT & PLAN NOTE
[address] [facility] [employer] [facility]        NAME: Heri Castellon is a 46 y o  male  : 1981    MRN: [id number]  DATE: 2022  TIME: 9:36 AM    Assessment and Orders   Plantar fasciitis [M72 2]  1  Plantar fasciitis  Ambulatory Referral to Physical Therapy    meloxicam (Mobic) 15 mg tablet   2  Acute right ankle pain  XR ankle 3+ vw right    Ambulatory Referral to Physical Therapy         Plan and Discussion      Symptoms and exam consistent with exacerbation of plantar fasciitis  As patient is already been seen by Orthopedics, will refer directly to physical therapy  Discussed stretches that patient can do at home  Given 5 day course of Mobic as an anti-inflammatory  Risks and benefits discussed  Patient understands and agrees with the plan  Follow up with PCP  Chief Complaint     Chief Complaint   Patient presents with   • Foot Pain     Right foot and ankle painful and swollen x 1 week no injury noted          History of Present Illness       HPI     Patient is a 68-year-old male here for evaluation of right ankle pain  Patient states he was evaluated for something similar approximately 1 year ago and was told symptoms were secondary to plantar fasciitis  Patient evaluated by Orthopedics in 2021 and was recommended PT OT at that time  Today patient returns with similar symptoms  Patient states that the bottom of his foot has never hurt and that all of the pain has been at the lateral portion of his right ankle just inferior to the lateral malleolus  He states pain is worse in the morning  States it feels very tight and approximately 1 week ago notice that his lateral malleolus was swollen  He denies skin changes in bruising  He states that his toes are numb  He did not follow-up with orthopedics after his initial visit last November  He did not follow-up with PT OT as recommended by Orthopedics  He states there has no been no recent injury      Review of Systems   Review Patient will be transferred to inpatient [facility]   Musculoskeletal: Positive for joint swelling  Skin: Negative for color change, pallor, rash and wound  Neurological: Positive for numbness (in toes)           Current Medications       Current Outpatient Medications:   •  albuterol (ProAir HFA) 90 mcg/act inhaler, Inhale 2 puffs every 4 (four) hours as needed for wheezing, Disp: 8 5 g, Rfl: 0  •  albuterol (PROVENTIL HFA,VENTOLIN HFA) 90 mcg/act inhaler, inhale 2 puffs every 6 hours if needed for wheezing, Disp: , Rfl: 0  •  albuterol (Ventolin HFA) 90 mcg/act inhaler, Inhale 2 puffs every 4 (four) hours as needed for wheezing or shortness of breath, Disp: 18 g, Rfl: 0  •  ARIPiprazole (ABILIFY) 5 mg tablet, Take 5 mg by mouth every morning, Disp: , Rfl:   •  ascorbic acid (VITAMIN C) 500 mg tablet, Take 500 mg by mouth daily, Disp: , Rfl:   •  benzonatate (TESSALON PERLES) 100 mg capsule, Take 1 capsule (100 mg total) by mouth 3 (three) times a day as needed for cough, Disp: 20 capsule, Rfl: 0  •  benzonatate (TESSALON) 200 MG capsule, Take 1 capsule (200 mg total) by mouth 3 (three) times a day as needed for cough, Disp: 20 capsule, Rfl: 0  •  buPROPion (WELLBUTRIN XL) 300 mg 24 hr tablet, Take 300 mg by mouth every morning, Disp: , Rfl:   •  cyclobenzaprine (FLEXERIL) 10 mg tablet, Take 1 tablet (10 mg total) by mouth 2 (two) times a day as needed for muscle spasms, Disp: 20 tablet, Rfl: 0  •  dupilumab (DUPIXENT) subcutaneous injection, Inject under the skin once, Disp: , Rfl:   •  fluticasone (FLONASE) 50 mcg/act nasal spray, , Disp: , Rfl:   •  ibuprofen (MOTRIN) 600 mg tablet, Take 1 tablet (600 mg total) by mouth every 6 (six) hours as needed for mild pain Take with food, Disp: 30 tablet, Rfl: 0  •  meloxicam (Mobic) 15 mg tablet, Take 1 tablet (15 mg total) by mouth daily for 5 days, Disp: 5 tablet, Rfl: 0  •  methocarbamol (ROBAXIN) 500 mg tablet, Take 1 tablet (500 mg total) by mouth 2 (two) times a day, Disp: 20 tablet, Rfl: 0  • montelukast (SINGULAIR) 10 mg tablet, Take 10 mg by mouth daily, Disp: , Rfl:   •  Probiotic Product (PROBIOTIC-10) CAPS, Take by mouth, Disp: , Rfl:     Current Allergies     Allergies as of 11/07/2022 - Reviewed 11/07/2022   Allergen Reaction Noted   • Alprazolam  08/30/2016   • Naproxen  01/30/2018   • Other  08/18/2016   • Penicillin g  12/05/2018   • Prednisone  08/30/2016            The following portions of the patient's history were reviewed and updated as appropriate: allergies, current medications, past family history, past medical history, past social history, past surgical history and problem list      Past Medical History:   Diagnosis Date   • Asthma        Past Surgical History:   Procedure Laterality Date   • SINUS SURGERY         Family History   Problem Relation Age of Onset   • Diabetes Mother    • Mental illness Mother    • Hyperlipidemia Mother    • Heart disease Mother    • COPD Mother    • Asthma Mother    • Mental illness Sister          Medications have been verified  Objective   /80   Pulse 66   Temp 98 6 °F (37 °C)   Resp 18   Ht 5' 11" (1 803 m)   Wt 125 kg (275 lb)   SpO2 97%   BMI 38 35 kg/m²   No LMP for male patient  Physical Exam     Physical Exam  Constitutional:       General: He is not in acute distress  Appearance: He is not ill-appearing  HENT:      Head: Normocephalic and atraumatic  Right Ear: External ear normal       Left Ear: External ear normal       Nose: Nose normal    Cardiovascular:      Rate and Rhythm: Normal rate and regular rhythm  Pulmonary:      Effort: Pulmonary effort is normal  No respiratory distress  Musculoskeletal:      Right ankle: Swelling present  No deformity, ecchymosis or lacerations  Tenderness present over the lateral malleolus  Decreased range of motion (Decreased active flexion and and extension due to pain, full range of motion passively)  Anterior drawer test negative  Normal pulse     Skin:     General: Skin is warm and dry  Neurological:      General: No focal deficit present  Mental Status: He is alert and oriented to person, place, and time  Psychiatric:         Mood and Affect: Mood normal          Behavior: Behavior normal          Thought Content:  Thought content normal          Judgment: Judgment normal                Lou Montoya DO

## 2022-11-07 NOTE — CASE MANAGEMENT
Contacted [facility] to find out OP psych appt date/time  Waiting on response  OP psych appt scheduled for 11/28 at [facility]  Discharge tomorrow

## 2022-11-07 NOTE — NURSING NOTE
Pt is calm and pleasant brightens on approach  Pt is cooperative with care and med compliant  Pt rates depression 5/10 and anxiety 1/4  Pt denies SI,HI, AVH  Will continue to monitor

## 2022-11-07 NOTE — PROGRESS NOTES
Progress Note - Behavioral Health   Priscilla Garcia 56 y o  female MRN: [id number]  Unit/Bed#: WMNKJ 580-96 Encounter: [id number]    The patient was seen for continuing care and reviewed with treatment team  She is in a good mood, immediately asks about discharge  She is smiling and reports feeling much better with medication adjustments made  She feels less stressed, she has decided she will not be moving in with her parents because of their rules and the fact she would not be able to go with her dogs  She reports she OD because she was trying to get attention from her boyfriend and a lot was happening at the time  Her aunt from [place] will be moving in with her parents  No side effects from the Abilify  ROS is negative      /78 (BP Location: Left arm)   Pulse 82   Temp (!) 96 6 °F (35 9 °C) (Temporal)   Resp 18   Ht 5' 3" (1 6 m)   Wt 86 4 kg (190 lb 7 6 oz)   SpO2 97%   BMI 33 74 kg/m²     Current Mental Status Evaluation:  Appearance:  Marginal/poor hygiene   Behavior:  calm and cooperative   Mood:  Euthymic   Affect: mood-congruent   Speech: Normal volume and Normal rate   Thought Process:  Goal directed and coherent   Thought Content:  Does not verbalize delusional material   Perceptual Disturbances: Denies hallucinations and does not appear to be responding to internal stimuli   Risk Potential: No suicidal or homicidal ideation   Orientation:   Patient is alert,a wake and oriented x 3   Pt has limited insight, judgment and impulse control       Recent Results (from the past 72 hour(s))   Fingerstick Glucose (POCT)    Collection Time: 11/04/22 11:35 AM   Result Value Ref Range    POC Glucose 194 (H) 65 - 140 mg/dl   Fingerstick Glucose (POCT)    Collection Time: 11/04/22  4:18 PM   Result Value Ref Range    POC Glucose 133 65 - 140 mg/dl   Fingerstick Glucose (POCT)    Collection Time: 11/04/22  9:24 PM   Result Value Ref Range    POC Glucose 173 (H) 65 - 140 mg/dl   Fingerstick Glucose (POCT) Collection Time: 11/05/22  8:16 AM   Result Value Ref Range    POC Glucose 134 65 - 140 mg/dl   Fingerstick Glucose (POCT)    Collection Time: 11/05/22 12:18 PM   Result Value Ref Range    POC Glucose 162 (H) 65 - 140 mg/dl   Fingerstick Glucose (POCT)    Collection Time: 11/05/22  4:24 PM   Result Value Ref Range    POC Glucose 110 65 - 140 mg/dl   Fingerstick Glucose (POCT)    Collection Time: 11/05/22  9:39 PM   Result Value Ref Range    POC Glucose 167 (H) 65 - 140 mg/dl   Fingerstick Glucose (POCT)    Collection Time: 11/06/22  7:27 AM   Result Value Ref Range    POC Glucose 120 65 - 140 mg/dl   Fingerstick Glucose (POCT)    Collection Time: 11/06/22 11:28 AM   Result Value Ref Range    POC Glucose 139 65 - 140 mg/dl   Fingerstick Glucose (POCT)    Collection Time: 11/06/22  4:38 PM   Result Value Ref Range    POC Glucose 126 65 - 140 mg/dl   Fingerstick Glucose (POCT)    Collection Time: 11/06/22  9:19 PM   Result Value Ref Range    POC Glucose 131 65 - 140 mg/dl   Fingerstick Glucose (POCT)    Collection Time: 11/07/22  7:25 AM   Result Value Ref Range    POC Glucose 114 65 - 140 mg/dl     Progress Toward Goals: No significant events in the past 24 hours  Patient is tolerating the trazodone, Abilify 10mg daily and Gabapentin 800mg TID   Principal Problem:    Bipolar disorder current episode depressed ([address] )  Active Problems:    Acquired hypothyroidism    Hyperlipemia    Medical clearance for psychiatric admission    Gastroesophageal reflux disease without esophagitis    Diabetes mellitus type 2 in [facility])    Primary hypertension    Cigarette smoker    Discharge planning update: The patient will return to previous living arrangement  Discharge tomorrow    Recommended Treatment: Continue with pharmacotherapy, group therapy, milieu therapy and occupational therapy    The patient will be maintained on the following medications:  Current Facility-Administered Medications   Medication Dose Route Frequency Provider Last Rate   • acetaminophen  650 mg Oral Q4H PRN Sisi Romero MD     • acetaminophen  650 mg Oral Q4H PRN Sisi Romero MD     • acetaminophen  975 mg Oral Q6H PRN Sisi Romero MD     • ARIPiprazole  10 mg Oral Daily Sisi Romero MD     • atorvastatin  20 mg Oral Daily With Froilan Vasquez MD     • benztropine  1 mg Oral Daily Sisi Romero MD     • clonazePAM  0 5 mg Oral BID PRN Sisi Romero MD     • gabapentin  800 mg Oral TID Sisi Romero MD     • hydrochlorothiazide  12 5 mg Oral Daily NASRIN JohnsonC     • insulin glargine  25 Units Subcutaneous HS Sarah Fermin PA-C     • insulin lispro  1-5 Units Subcutaneous HS Maynor Rainey PA-C     • insulin lispro  1-6 Units Subcutaneous TID AC Maynor Rainey PA-C     • levothyroxine  25 mcg Oral Early Morning Sisi Romero MD     • LORazepam  1 mg Intramuscular Q6H PRN Max 3/day Sisi Romero MD     • LORazepam  0 5 mg Oral Q6H PRN Max 4/day Sisi Romero MD     • LORazepam  1 mg Oral Q6H PRN Max 3/day Sisi Romero MD     • melatonin  6 mg Oral HS PRN Sisi Romero MD     • metFORMIN  1,000 mg Oral BID With Meals Sisi Romero MD     • metoprolol tartrate  50 mg Oral Q12H [facility] NASRIN JohnsonC     • nicotine  21 mg Transdermal Daily Maynor Rainey PA-C     • nicotine polacrilex  2 mg Oral Q2H PRN NASRIN JohnsonC     • OLANZapine  5 mg Intramuscular Q3H PRN Max 3/day Sisi Romero MD     • OLANZapine  2 5 mg Oral Q4H PRN Max 6/day Sisi Romero MD     • OLANZapine  5 mg Oral Q4H PRN Max 3/day Sisi Romero MD     • OLANZapine  5 mg Oral Q3H PRN Max 3/day Sisi Romero MD     • pantoprazole  40 mg Oral Daily Before Breakfast Sisi Romero MD     • senna-docusate sodium  1 tablet Oral Daily PRN Sisi Romero MD     • traZODone  200 mg Oral HS Sisi Romero MD

## 2022-11-07 NOTE — CASE MANAGEMENT
11/07/22 0840   Team Meeting   Meeting Type Daily Rounds   Initial Conference Date 11/07/22   Team Members Present   Team Members Present Physician;Nurse;;; Occupational Therapist   Physician Team Member Dr Estrada Nolasco Management Team Member [address] [place] [address] Work Team Member Destinee   OT Team Member Warren Roper   Patient/Family Present   Patient Present No   Patient's Family Present No      Calm, pleasant, cooperative, med compliant, slept, 5/10 depression, denies other s/s, possible d/c today or tomorrow

## 2022-11-07 NOTE — NURSING NOTE
Pt attended group  Denied SI    at 1100  VSS  Good appetite and steady gait  Anxious with irritable edge  No agitation noted  Monitored for safety and support

## 2022-11-08 VITALS
TEMPERATURE: 96.9 F | HEART RATE: 80 BPM | SYSTOLIC BLOOD PRESSURE: 148 MMHG | DIASTOLIC BLOOD PRESSURE: 84 MMHG | HEIGHT: 63 IN | OXYGEN SATURATION: 97 % | BODY MASS INDEX: 33.71 KG/M2 | RESPIRATION RATE: 16 BRPM | WEIGHT: 190.26 LBS

## 2022-11-08 LAB
GLUCOSE SERPL-MCNC: 121 MG/DL (ref 65–140)
GLUCOSE SERPL-MCNC: 156 MG/DL (ref 65–140)

## 2022-11-08 RX ORDER — NICOTINE 21 MG/24HR
1 PATCH, TRANSDERMAL 24 HOURS TRANSDERMAL DAILY
Qty: 28 PATCH | Refills: 0 | Status: SHIPPED | OUTPATIENT
Start: 2022-11-08

## 2022-11-08 RX ORDER — INSULIN GLARGINE 100 [IU]/ML
25 INJECTION, SOLUTION SUBCUTANEOUS
Qty: 10 ML | Refills: 0
Start: 2022-11-08

## 2022-11-08 RX ORDER — ARIPIPRAZOLE 10 MG/1
10 TABLET ORAL DAILY
Qty: 30 TABLET | Refills: 0 | Status: SHIPPED | OUTPATIENT
Start: 2022-11-08

## 2022-11-08 RX ORDER — METOPROLOL TARTRATE 50 MG/1
50 TABLET, FILM COATED ORAL EVERY 12 HOURS SCHEDULED
Qty: 30 TABLET
Start: 2022-11-08 | End: 2022-11-14 | Stop reason: SDUPTHER

## 2022-11-08 RX ADMIN — METOPROLOL TARTRATE 50 MG: 50 TABLET, FILM COATED ORAL at 08:55

## 2022-11-08 RX ADMIN — NICOTINE 21 MG: 21 PATCH, EXTENDED RELEASE TRANSDERMAL at 09:04

## 2022-11-08 RX ADMIN — METFORMIN HYDROCHLORIDE 1000 MG: 500 TABLET ORAL at 08:54

## 2022-11-08 RX ADMIN — ARIPIPRAZOLE 10 MG: 10 TABLET ORAL at 08:54

## 2022-11-08 RX ADMIN — PANTOPRAZOLE SODIUM 40 MG: 40 TABLET, DELAYED RELEASE ORAL at 06:16

## 2022-11-08 RX ADMIN — BENZTROPINE MESYLATE 1 MG: 1 TABLET ORAL at 08:55

## 2022-11-08 RX ADMIN — LEVOTHYROXINE SODIUM 25 MCG: 25 TABLET ORAL at 06:16

## 2022-11-08 RX ADMIN — HYDROCHLOROTHIAZIDE 12.5 MG: 12.5 TABLET ORAL at 08:52

## 2022-11-08 RX ADMIN — GABAPENTIN 800 MG: 400 CAPSULE ORAL at 08:55

## 2022-11-08 NOTE — PROGRESS NOTES
11/08/22 1000 11/08/22 1030   Activity/Group Checklist   Group Community meeting  (topic healthy life advice ) Admission/Discharge  (Pt  completed relapse prevention plan )   Attendance Attended Attended   Attendance Duration (min) 16-30 0-15  (1-1)   Interactions Interacted appropriately Interacted appropriately   Affect/Mood Appropriate;Normal range;Calm Appropriate;Calm;Normal range   Goals Achieved Able to listen to others; Able to engage in interactions; Discussed coping strategies Able to listen to others; Identified relapse prevention strategies; Discussed coping strategies; Identified resources and support systems; Able to engage in interactions Continuity of Care Document

 Created on: 2018



HUBER FUNEZ

External Reference #: [id number]

: 1964

Sex: Female



Demographics







 Address  [address] [place]  [address]

 

 Home Phone  [phone] x

 

 Preferred Language  Unknown

 

 Marital Status  Unknown

 

 Mu-ism Affiliation  Unknown

 

 Race  Unknown

 

 Ethnic Group  Unknown





Author







 Author  [facility]

 

 Organization  [facility]

 

 Address  Unknown

 

 Phone  Unavailable



              



Allergies

      





 Active            Description            Code            Type            
Severity            Reaction            Onset            Reported/Identified   
         Relationship to Patient            Clinical Status        

 

 Yes            cephalexin            [id number]            Drug Allergy       
     Unknown            N/A                         2006                 
                 

 

 Yes            erythromycin base            [id number]            Drug Allergy
            Unknown            N/A                         2018          
                        

 

 Yes            latex            [id number]            Drug Allergy            
Unknown            N/A                         2018                      
            

 

 Yes            Penicillins            [id number]            Drug Allergy      
      Unknown            RASH                         2018               
                   



                        



Medications

      



There is no data.                  



Problems

      





 Date Dx Coded            Attending            Type            Code            
Diagnosis            Diagnosed By        

 

 2010                         Ot            041.7            PSEUDOMONAS 
INFECT NOS                     

 

 2010                         Ot            444.22            LOWER 
EXTREMITY EMBOLISM                     

 

 2010                         Ot            909.3            LATE EFF SURG
/MED COMPL                     

 

 2010                         Ot            998.51            INFECTED 
POSTOP SEROMA                     

 

 2010                         Ot            E849.7            ACCID IN 
RESIDENT INSTIT                     

 

 2010                         Ot            E870.0            ACC CUT/HEM 
IN SURGERY                     

 

 2010                         Ot            V43.65            KNEE JOINT 
REPLACEMENT STATUS                     

 

 06/10/2010                         Ot            041.7                        
          

 

 06/10/2010                         Ot            780.60                       
           

 

 2010                         Ot            444.22                       
           

 

 2010                         Ot            997.2                        
          

 

 2010                         Ot            285.1            AC 
POSTHEMORRHAG ANEMIA                     

 

 2010                         Ot            493.90            ASTHMA, 
UNSPECIFIED                     

 

 2010                         Ot            715.96            
OSTEOARTHROS NOS-L/LEG                     

 

 2010                         Ot            996.74            OTH COMPL 
DUE TO OTH VASCULAR DEVICE,IMP                     

 

 10/07/2010                         Ot            V43.65                       
           

 

 10/07/2010                         Ot            V54.81                       
           

 

 10/07/2010                         Ot            V57.1                        
          

 

 10/27/2010                         Ot            444.22                       
           

 

 10/27/2010                         Ot            493.90                       
           

 

 10/27/2010                         Ot            716.90                       
           

 

 10/27/2010                         Ot            729.1                        
          

 

 10/27/2010                         Ot            V16.3                        
          

 

 10/27/2010                         Ot            V16.41                       
           

 

 10/27/2010                         Ot            V58.61                       
           

 

 10/27/2010                         Ot            V58.69                       
           

 

 2011                         Ot            444.22            LOWER 
EXTREMITY EMBOLISM                     

 

 2011                         Ot            493.90            ASTHMA, 
UNSPECIFIED                     

 

 2011                         Ot            716.90            ARTHROPATHY 
NOS-UNSPEC                     

 

 2011                         Ot            729.1            MYALGIA AND 
MYOSITIS NOS                     

 

 2011                         Ot            V16.3            FAMILY HX-
BREAST MALIG                     

 

 2011                         Ot            V16.41            FAM HX-MAL 
NEOP-OVARY                     

 

 2011                         Ot            V58.61            
ANTICOAGULANTS,LT,CURRENT USE                     

 

 2011                         Ot            V58.69            OTH MED,LT,
CURRENT USE                     

 

 2012                         Ot            285.9            ANEMIA NOS  
                   

 

 2012                         Ot            V58.61            
ANTICOAGULANTS,LT,CURRENT USE                     

 

 2012                         Ot            285.9            ANEMIA NOS  
                   

 

 2012                         Ot            V58.61            
ANTICOAGULANTS,LT,CURRENT USE                     

 

 2012                         Ot            285.9            ANEMIA NOS  
                   

 

 2012                         Ot            V16.3            FAMILY HX-
BREAST MALIG                     

 

 2012                         Ot            V16.41            FAM HX-MAL 
NEOP-OVARY                     

 

 2012                         Ot            V58.61            
ANTICOAGULANTS,LT,CURRENT USE                     

 

 2013            MIKE LARA, RISA JARA            Ot            368.2      
      DIPLOPIA                     

 

 2013            RISA MCKEON MD            Ot            780.4      
      DIZZINESS AND GIDDINESS                     

 

 10/08/2013            STEPHY COLE MD            Ot            723.0        
    CERVICAL SPINAL STENOSIS                     

 

 10/08/2013            STEPHY COLE MD            Ot            724.1        
    PAIN IN THORACIC SPINE                     

 

 10/08/2013            STEPHY COLE MD            Ot            724.2        
    LUMBAGO                     

 

 10/08/2013            STEPHY COLE MD            Ot            V43.65       
     KNEE JOINT REPLACEMENT STATUS                     

 

 10/08/2013            STEPHY COLE MD            Ot            V57.1        
    PHYSICAL THERAPY NEC                     

 

 2014            STEPHY COLE MD            Ot            276.8        
    HYPOPOTASSEMIA                     

 

 2014            STEPHY COLE MD            Ot            285.9        
    ANEMIA NOS                     

 

 2014            STEPHY COLE MD            Ot            487.1        
    FLU W RESP MANIFEST NEC                     

 

 2014            STEPHY COLE MD            Ot            V03.82       
     PROPHYLACTIC VACC AGAINST STREPTOCOCCUS                      

 

 2015                         Ot            V76.12                       
           

 

 2015                         Ot            440.20                       
           

 

 2015                         Ot            440.4                        
          

 

 2015                         Ot            V72.63                       
           

 

 2015                         Ot            V72.81                       
           

 

 2015                         Ot            V74.8                        
          

 

 2015                         Ot            285.9                        
          

 

 2015                         Ot            V58.61                       
           

 

 2015                         Ot            444.22                       
           

 

 2015                         Ot            493.90                       
           

 

 2015                         Ot            716.90                       
           

 

 2015                         Ot            729.1                        
          

 

 2015                         Ot            V16.3                        
          

 

 2015                         Ot            V16.41                       
           

 

 2015                         Ot            V58.61                       
           

 

 2015                         Ot            996.74                       
           

 

 2015                         Ot            444.22                       
           

 

 2015                         Ot            493.90                       
           

 

 2015                         Ot            716.90                       
           

 

 2015                         Ot            729.1                        
          

 

 2015                         Ot            V16.3                        
          

 

 2015                         Ot            V16.41                       
           

 

 2015                         Ot            V58.61                       
           

 

 2015                         Ot            V58.69                       
           

 

 2015                         Ot            V76.12                       
           

 

 2015                         Ot            288.50                       
           

 

 2015                         Ot            444.22                       
           

 

 2015                         Ot            493.90                       
           

 

 2015                         Ot            716.90                       
           

 

 2015                         Ot            729.1                        
          

 

 2015                         Ot            V16.3                        
          

 

 2015                         Ot            V16.41                       
           

 

 2015                         Ot            V58.61                       
           

 

 2015                         Ot            V58.69                       
           

 

 2015                         Ot            793.89                       
           

 

 2015                         Ot            V16.3                        
          

 

 2015                         Ot            611.72                       
           

 

 2015                         Ot            611.72                       
           

 

 2015                         Ot            V72.63                       
           

 

 2015                         Ot            V72.81                       
           

 

 2015                         Ot            V74.8                        
          

 

 2015                         Ot            729.5                        
          

 

 2015                         Ot            V12.51                       
           

 

 2015                         Ot            V16.3                        
          

 

 2015                         Ot            V76.12                       
           

 

 2015                         Ot            793.80                       
           

 

 2015                         Ot            V16.3                        
          

 

 2015                         Ot            286.9                        
          

 

 2015                         Ot            443.9                        
          

 

 2015                         Ot            V16.3                        
          

 

 2015                         Ot            V58.66                       
           

 

 2015                         Ot            V58.69                       
           

 

 2015                         Ot            443.9                        
          

 

 2015                         Ot            729.81                       
           

 

 2015                         Ot            782.3                        
          

 

 2015                         Ot            285.9                        
          

 

 2015                         Ot            V16.3                        
          

 

 2015                         Ot            V16.41                       
           

 

 2015                         Ot            V58.61                       
           

 

 2015                         Ot            786.50                       
           

 

 2015                         Ot            789.06                       
           

 

 2015                         Ot            786.50                       
           

 

 2015                         Ot            789.06                       
           

 

 2015                         Ot            V12.51                       
           

 

 2015                         Ot            V12.71                       
           

 

 2015                         Ot            V58.69                       
           

 

 2015                         Ot            789.05                       
           

 

 2015            DOUGLAS LARA, STEPHY J            Ot            244.9        
                          

 

 2015            DOUGLAS LARA, STEPHY J            Ot            336.9        
                          

 

 2015            DOUGLAS LARA, [facility]            Ot            722.10       
                           

 

 2015            DOUGLAS LARA, [facility]            Ot            722.11       
                           

 

 2015            DOUGLAS LARA, [facility]            Ot            793.89       
                           

 

 2015            DOUGLAS LARA, [facility]            Ot            V16.3        
                          

 

 2015            DOUGLAS LARA, STEPHY J            Ot            244.9        
                          

 

 2015            DOUGLAS LARA, [facility]            Ot            440.20       
                           

 

 2015            DOUGLAS LARA, [facility]            Ot            440.4        
                          

 

 2015            DOUGLAS LARA, [facility]            Ot            729.5        
                          

 

 2015            DOUGLAS LARA, [facility]            Ot            V12.51       
                           

 

 2015            DOUGLAS LARA, [facility]            Ot            729.5        
                          

 

 2015            DOUGLAS LARA, [facility]            Ot            V12.51       
                           

 

 2015            DOUGLAS LARA, [facility]            Ot            440.20       
                           

 

 2015            DOUGLAS LARA, [facility]            Ot            440.4        
                          

 

 2015            DOUGLAS LARA, [facility]            Ot            729.5        
                          

 

 2015            DOUGLAS LARA, STEPHY DULCE            Ot            V12.51       
                           

 

 2016                         Ot            444.22                       
           

 

 2016                         Ot            493.90                       
           

 

 2016                         Ot            716.90                       
           

 

 2016                         Ot            729.1                        
          

 

 2016                         Ot            V16.3                        
          

 

 2016                         Ot            V16.41                       
           

 

 2016                         Ot            V58.61                       
           

 

 2016                         Ot            V58.69                       
           

 

 2016                         Ot            V76.12                       
           

 

 2016                         Ot            288.50                       
           

 

 2016                         Ot            444.22                       
           

 

 2016                         Ot            493.90                       
           

 

 2016                         Ot            716.90                       
           

 

 2016                         Ot            729.1                        
          

 

 2016                         Ot            V16.3                        
          

 

 2016                         Ot            V16.41                       
           

 

 2016                         Ot            V58.61                       
           

 

 2016                         Ot            V58.69                       
           

 

 2016                         Ot            793.89                       
           

 

 2016                         Ot            V16.3                        
          

 

 2016                         Ot            611.72                       
           

 

 2016                         Ot            611.72                       
           

 

 2016                         Ot            V72.63                       
           

 

 2016                         Ot            V72.81                       
           

 

 2016                         Ot            V74.8                        
          

 

 2016                         Ot            729.5                        
          

 

 2016                         Ot            V12.51                       
           

 

 2016                         Ot            V16.3                        
          

 

 2016                         Ot            V76.12                       
           

 

 2016                         Ot            793.80                       
           

 

 2016                         Ot            V16.3                        
          

 

 2016                         Ot            286.9                        
          

 

 2016                         Ot            443.9                        
          

 

 2016                         Ot            V16.3                        
          

 

 2016                         Ot            V58.66                       
           

 

 2016                         Ot            V58.69                       
           

 

 2016                         Ot            443.9                        
          

 

 2016                         Ot            729.81                       
           

 

 2016                         Ot            782.3                        
          

 

 2016                         Ot            285.9                        
          

 

 2016                         Ot            V16.3                        
          

 

 2016                         Ot            V16.41                       
           

 

 2016                         Ot            V58.61                       
           

 

 2016                         Ot            786.50                       
           

 

 2016                         Ot            789.06                       
           

 

 2016                         Ot            786.50                       
           

 

 2016                         Ot            789.06                       
           

 

 2016                         Ot            V12.51                       
           

 

 2016                         Ot            V12.71                       
           

 

 2016                         Ot            V58.69                       
           

 

 2016                         Ot            789.05                       
           

 

 2016            DOUGLAS LARA, STEPHY CARDONA            Ot            244.9        
                          

 

 2016            DOUGLAS LARA, STEPHY CARDONA            Ot            336.9        
                          

 

 2016            DOUGLAS LARA, STEPHY CARDONA            Ot            722.10       
                           

 

 2016            DOUGLAS LARA, STEPHY CARDONA            Ot            722.11       
                           

 

 2016            DOUGLAS LARA, STEPHY CARDONA            Ot            793.89       
                           

 

 2016            DOUGLAS LARA, STEPHY CARDONA            Ot            V16.3        
                          

 

 2016            DOUGLAS LARA, STEPHY CARDONA            Ot            244.9        
                          

 

 2016            DOUGLAS LARA, STEPHY CARDONA            Ot            729.5        
                          

 

 2016            DOUGLAS LARA, STEPHY CARDONA            Ot            V12.51       
                           

 

 2016            DOUGLAS LARA, STEPHY CARDONA            Ot            440.20       
                           

 

 2016            DOUGLAS LARA, STEPHY CARDONA            Ot            440.4        
                          

 

 2016            DOUGLAS LARA, STEPHY CARDONA            Ot            729.5        
                          

 

 2016            DOUGLAS LARA, STEPHY CARDONA            Ot            V12.51       
                           

 

 2016                         Ot            444.22                       
           

 

 2016                         Ot            493.90                       
           

 

 2016                         Ot            716.90                       
           

 

 2016                         Ot            729.1                        
          

 

 2016                         Ot            V16.3                        
          

 

 2016                         Ot            V16.41                       
           

 

 2016                         Ot            V58.61                       
           

 

 2016                         Ot            V58.69                       
           

 

 2016                         Ot            V76.12                       
           

 

 2016                         Ot            288.50                       
           

 

 2016                         Ot            444.22                       
           

 

 2016                         Ot            493.90                       
           

 

 2016                         Ot            716.90                       
           

 

 2016                         Ot            729.1                        
          

 

 2016                         Ot            V16.3                        
          

 

 2016                         Ot            V16.41                       
           

 

 2016                         Ot            V58.61                       
           

 

 2016                         Ot            V58.69                       
           

 

 2016                         Ot            793.89                       
           

 

 2016                         Ot            V16.3                        
          

 

 2016                         Ot            611.72                       
           

 

 2016                         Ot            611.72                       
           

 

 2016                         Ot            V72.63                       
           

 

 2016                         Ot            V72.81                       
           

 

 2016                         Ot            V74.8                        
          

 

 2016                         Ot            729.5                        
          

 

 2016                         Ot            V12.51                       
           

 

 2016                         Ot            V16.3                        
          

 

 2016                         Ot            V76.12                       
           

 

 2016                         Ot            793.80                       
           

 

 2016                         Ot            V16.3                        
          

 

 2016                         Ot            286.9                        
          

 

 2016                         Ot            443.9                        
          

 

 2016                         Ot            V16.3                        
          

 

 2016                         Ot            V58.66                       
           

 

 2016                         Ot            V58.69                       
           

 

 2016                         Ot            443.9                        
          

 

 2016                         Ot            729.81                       
           

 

 2016                         Ot            782.3                        
          

 

 2016                         Ot            285.9                        
          

 

 2016                         Ot            V16.3                        
          

 

 2016                         Ot            V16.41                       
           

 

 2016                         Ot            V58.61                       
           

 

 2016                         Ot            786.50                       
           

 

 2016                         Ot            789.06                       
           

 

 2016                         Ot            786.50                       
           

 

 2016                         Ot            789.06                       
           

 

 2016                         Ot            V12.51                       
           

 

 2016                         Ot            V12.71                       
           

 

 2016                         Ot            V58.69                       
           

 

 2016                         Ot            789.05                       
           

 

 2016            DOUGLAS LARA, STEPHY CARDONA            Ot            244.9        
                          

 

 2016            DOUGLAS LARA, STEPHY CARDONA            Ot            336.9        
                          

 

 2016            DOUGLAS LARA, STEPHY CARDONA            Ot            722.10       
                           

 

 2016            DOUGLAS LARA, STEPHY CARDONA            Ot            722.11       
                           

 

 2016            DOUGLAS LARA, STEPHY CARDONA            Ot            793.89       
                           

 

 2016            DOUGLAS LARA, STEPHY CARDONA            Ot            V16.3        
                          

 

 2016            DOUGLAS LARA, STEPHY CARDONA            Ot            244.9        
                          

 

 2016            DOUGLAS LARA, STEPHY CARDONA            Ot            729.5        
                          

 

 2016            DOUGLAS LARA, STEPHY CARDONA            Ot            V12.51       
                           

 

 2016            DOUGLAS LARA, STEPHY CARDONA            Ot            440.20       
                           

 

 2016            DOUGLAS LARA, STEPHY CARDONA            Ot            440.4        
                          

 

 2016            DOUGLAS LARA, STEPHY CARDONA            Ot            729.5        
                          

 

 2016            DOUGLAS LARA, STEPHY CARDONA            Ot            V12.51       
                           

 

 2016                         Ot            444.22            LOWER 
EXTREMITY EMBOLISM                     

 

 2016                         Ot            493.90            ASTHMA, 
UNSPECIFIED                     

 

 2016                         Ot            716.90            ARTHROPATHY 
NOS-UNSPEC                     

 

 2016                         Ot            729.1            MYALGIA AND 
MYOSITIS NOS                     

 

 2016                         Ot            V16.3            FAMILY HX-
BREAST MALIG                     

 

 2016                         Ot            V16.41            FAM HX-MAL 
NEOP-OVARY                     

 

 2016                         Ot            V58.61            
ANTICOAGULANTS,LT,CURRENT USE                     

 

 2016                         Ot            V58.69            OTH MED,LT,
CURRENT USE                     

 

 2016                         Ot            V76.12            OTH SCREEN 
MAMMO-MALIGN NEOPLASM OF AURY                     

 

 2016                         Ot            288.50            
LEUKOCYTOPENIA, UNSPECIFIED                     

 

 2016                         Ot            444.22            LOWER 
EXTREMITY EMBOLISM                     

 

 2016                         Ot            493.90            ASTHMA, 
UNSPECIFIED                     

 

 2016                         Ot            716.90            ARTHROPATHY 
NOS-UNSPEC                     

 

 2016                         Ot            729.1            MYALGIA AND 
MYOSITIS NOS                     

 

 2016                         Ot            V16.3            FAMILY HX-
BREAST MALIG                     

 

 2016                         Ot            V16.41            FAM HX-MAL 
NEOP-OVARY                     

 

 2016                         Ot            V58.61            
ANTICOAGULANTS,LT,CURRENT USE                     

 

 2016                         Ot            V58.69            OTH MED,LT,
CURRENT USE                     

 

 2016                         Ot            793.89            OTH (ABN) 
FINDINGS ON RADIOLOGICAL EXAMI                     

 

 2016                         Ot            V16.3            FAMILY HX-
BREAST MALIG                     

 

 2016                         Ot            611.72            LUMP OR 
MASS IN BREAST                     

 

 2016                         Ot            611.72            LUMP OR 
MASS IN BREAST                     

 

 2016                         Ot            V72.63            PRE-
PROCEDURAL LABORATORY EXAMINATION                     

 

 2016                         Ot            V72.81            EXAM-PRE-
OPERATIVE CARDIOVASCULAR                     

 

 2016                         Ot            V74.8            SCREEN-
BACTERIAL DIS NEC                     

 

 2016                         Ot            729.5            PAIN IN LIMB
                     

 

 2016                         Ot            V12.51            HX-VENOUS 
THROMBOSIS EMBOLISM                     

 

 2016                         Ot            V16.3            FAMILY HX-
BREAST MALIG                     

 

 2016                         Ot            V76.12            OTH SCREEN 
MAMMO-MALIGN NEOPLASM OF AURY                     

 

 2016                         Ot            793.80            UNSPEC 
ABNORMAL MAMMOGRAM                     

 

 2016                         Ot            V16.3            FAMILY HX-
BREAST MALIG                     

 

 2016                         Ot            286.9            COAGULAT 
DEFECT NEC/NOS                     

 

 2016                         Ot            443.9            PERIPH 
VASCULAR DIS NOS                     

 

 2016                         Ot            V16.3            FAMILY HX-
BREAST MALIG                     

 

 2016                         Ot            V58.66            LONG-TERM (
CURRENT) USE OF ASPIRIN                     

 

 2016                         Ot            V58.69            OTH MED,LT,
CURRENT USE                     

 

 2016                         Ot            443.9            PERIPH 
VASCULAR DIS NOS                     

 

 2016                         Ot            729.81            SWELLING OF 
LIMB                     

 

 2016                         Ot            782.3            EDEMA       
              

 

 2016                         Ot            285.9            ANEMIA NOS  
                   

 

 2016                         Ot            V16.3            FAMILY HX-
BREAST MALIG                     

 

 2016                         Ot            V16.41            FAM HX-MAL 
NEOP-OVARY                     

 

 2016                         Ot            V58.61            
ANTICOAGULANTS,LT,CURRENT USE                     

 

 2016                         Ot            786.50            CHEST PAIN 
NOS                     

 

 2016                         Ot            789.06            ABDOMINAL 
PAIN, EPIGASTRIC                     

 

 2016                         Ot            786.50            CHEST PAIN 
NOS                     

 

 2016                         Ot            789.06            ABDOMINAL 
PAIN, EPIGASTRIC                     

 

 2016                         Ot            V12.51            HX-VENOUS 
THROMBOSIS EMBOLISM                     

 

 2016                         Ot            V12.71            PERSONAL 
HISTORY OF PEPTIC ULCER DISEASE                     

 

 2016                         Ot            V58.69            OTH MED,LT,
CURRENT USE                     

 

 2016                         Ot            789.05            ABDOMINAL 
PAIN, PERIUMBILIC                     

 

 2016            STEPHY COLE MD            Ot            244.9        
    HYPOTHYROIDISM NOS                     

 

 2016            STEPHY COLE MD            Ot            336.9        
    SPINAL CORD DISEASE NOS                     

 

 2016            STEPHY COLE MD            Ot            722.10       
     LUMBAR DISC DISPLACEMENT                     

 

 2016            STEPHY COLE MD            Ot            722.11       
     THORACIC DISC DISPLACMNT                     

 

 2016            STEPHY COLE MD            Ot            793.89       
     OTH (ABN) FINDINGS ON RADIOLOGICAL EXAMI                     

 

 2016            STEPHY COLE MD            Ot            V16.3        
    FAMILY HX-BREAST MALIG                     

 

 2016            STEPHY COLE MD            Ot            244.9        
    HYPOTHYROIDISM NOS                     

 

 2016            STEPHY COLE MD            Ot            729.5        
    PAIN IN LIMB                     

 

 2016            STEPHY COLE MD            Ot            V12.51       
     HX-VENOUS THROMBOSIS EMBOLISM                     

 

 2016            STEPHY COLE MD            Ot            440.20       
     ATHEROSCLEROSIS NATIVE ARTERIES EXTREMIT                     

 

 2016            STEPHY COLE MD            Ot            440.4        
    CHRONIC TOTAL OCCLUSION OF ARTERY OF THE                     

 

 2016            STEPHY COLE MD            Ot            729.5        
    PAIN IN LIMB                     

 

 2016            STEPHY COLE MD            Ot            V12.51       
     HX-VENOUS THROMBOSIS EMBOLISM                     

 

 2017                         Ot            288.50            
LEUKOCYTOPENIA, UNSPECIFIED                     

 

 2017                         Ot            444.22            LOWER 
EXTREMITY EMBOLISM                     

 

 2017                         Ot            493.90            ASTHMA, 
UNSPECIFIED                     

 

 2017                         Ot            716.90            ARTHROPATHY 
NOS-UNSPEC                     

 

 2017                         Ot            729.1            MYALGIA AND 
MYOSITIS NOS                     

 

 2017                         Ot            V16.3            FAMILY HX-
BREAST MALIG                     

 

 2017                         Ot            V16.41            FAM HX-MAL 
NEOP-OVARY                     

 

 2017                         Ot            V58.61            
ANTICOAGULANTS,LT,CURRENT USE                     

 

 2017                         Ot            V58.69            OTH MED,LT,
CURRENT USE                     

 

 2017                         Ot            793.89            OTH (ABN) 
FINDINGS ON RADIOLOGICAL EXAMI                     

 

 2017                         Ot            V16.3            FAMILY HX-
BREAST MALIG                     

 

 2017                         Ot            611.72            LUMP OR 
MASS IN BREAST                     

 

 2017                         Ot            611.72            LUMP OR 
MASS IN BREAST                     

 

 2017                         Ot            V72.63            PRE-
PROCEDURAL LABORATORY EXAMINATION                     

 

 2017                         Ot            V72.81            EXAM-PRE-
OPERATIVE CARDIOVASCULAR                     

 

 2017                         Ot            V74.8            SCREEN-
BACTERIAL DIS NEC                     

 

 2017                         Ot            729.5            PAIN IN LIMB
                     

 

 2017                         Ot            V12.51            HX-VENOUS 
THROMBOSIS EMBOLISM                     

 

 2017                         Ot            V16.3            FAMILY HX-
BREAST MALIG                     

 

 2017                         Ot            V76.12            OTH SCREEN 
MAMMO-MALIGN NEOPLASM OF AURY                     

 

 2017                         Ot            793.80            UNSPEC 
ABNORMAL MAMMOGRAM                     

 

 2017                         Ot            V16.3            FAMILY HX-
BREAST MALIG                     

 

 2017                         Ot            286.9            COAGULAT 
DEFECT NEC/NOS                     

 

 2017                         Ot            443.9            PERIPH 
VASCULAR DIS NOS                     

 

 2017                         Ot            V16.3            FAMILY HX-
BREAST MALIG                     

 

 2017                         Ot            V58.66            LONG-TERM (
CURRENT) USE OF ASPIRIN                     

 

 2017                         Ot            V58.69            OTH MED,LT,
CURRENT USE                     

 

 2017                         Ot            443.9            PERIPH 
VASCULAR DIS NOS                     

 

 2017                         Ot            729.81            SWELLING OF 
LIMB                     

 

 2017                         Ot            782.3            EDEMA       
              

 

 2017                         Ot            285.9            ANEMIA NOS  
                   

 

 2017                         Ot            V16.3            FAMILY HX-
BREAST MALIG                     

 

 2017                         Ot            V16.41            FAM HX-MAL 
NEOP-OVARY                     

 

 2017                         Ot            V58.61            
ANTICOAGULANTS,LT,CURRENT USE                     

 

 2017                         Ot            786.50            CHEST PAIN 
NOS                     

 

 2017                         Ot            789.06            ABDOMINAL 
PAIN, EPIGASTRIC                     

 

 2017                         Ot            786.50            CHEST PAIN 
NOS                     

 

 2017                         Ot            789.06            ABDOMINAL 
PAIN, EPIGASTRIC                     

 

 2017                         Ot            V12.51            HX-VENOUS 
THROMBOSIS EMBOLISM                     

 

 2017                         Ot            V12.71            PERSONAL 
HISTORY OF PEPTIC ULCER DISEASE                     

 

 2017                         Ot            V58.69            OTH MED,LT,
CURRENT USE                     

 

 2017                         Ot            789.05            ABDOMINAL 
PAIN, PERIUMBILIC                     

 

 2017            DOUGLAS LARA, STEPHY CARDONA            Ot            244.9        
    HYPOTHYROIDISM NOS                     

 

 2017            DOUGLAS LARA, STEPHY CARDONA            Ot            336.9        
    SPINAL CORD DISEASE NOS                     

 

 2017            STEPHY COLE MD            Ot            722.10       
     LUMBAR DISC DISPLACEMENT                     

 

 2017            STEPHY COLE MD            Ot            722.11       
     THORACIC DISC DISPLACMNT                     

 

 2017            STEPHY COLE MD            Ot            793.89       
     OTH (ABN) FINDINGS ON RADIOLOGICAL EXAMI                     

 

 2017            STEPHY COLE MD            Ot            V16.3        
    FAMILY HX-BREAST MALIG                     

 

 2017            STEPHY COLE MD            Ot            244.9        
    HYPOTHYROIDISM NOS                     

 

 2017            STEPHY COLE MD            Ot            729.5        
    PAIN IN LIMB                     

 

 2017            STEPHY COLE MD            Ot            V12.51       
     HX-VENOUS THROMBOSIS EMBOLISM                     

 

 2017            STEPHY COLE MD            Ot            440.20       
     ATHEROSCLEROSIS NATIVE ARTERIES EXTREMIT                     

 

 2017            STEPHY COLE MD            Ot            440.4        
    CHRONIC TOTAL OCCLUSION OF ARTERY OF THE                     

 

 2017            STEPHY COLE MD            Ot            729.5        
    PAIN IN LIMB                     

 

 2017            STEPHY COLE MD            Ot            V12.51       
     HX-VENOUS THROMBOSIS EMBOLISM                     

 

 2017            STEPHANIE LARA, BRENDA TILLEY            Ot            M79.672   
         PAIN IN LEFT FOOT                     

 

 2017            STEPHANIE LARA, BRENDA TILLEY            Ot            M79.672   
         PAIN IN LEFT FOOT                     

 

 2017            STEPHANIE LARA, BRENDA TILLEY            Ot            M25.572   
         PAIN IN LEFT ANKLE AND JOINTS OF LEFT FO                     

 

 2017            BRENDA BROWN MD            Ot            M79.672   
         PAIN IN LEFT FOOT                     

 

 10/18/2017            STEPHANIE LARA, BRENDA TILLEY            Ot            N63.11    
        UNSPECIFIED LUMP IN THE RIGHT BREAST, UP                     

 

 10/25/2017                         Ot            V16.3            FAMILY HX-
BREAST MALIG                     

 

 10/25/2017                         Ot            V76.12            OTH SCREEN 
MAMMO-MALIGN NEOPLASM OF AURY                     

 

 10/25/2017                         Ot            793.80            UNSPEC 
ABNORMAL MAMMOGRAM                     

 

 10/25/2017                         Ot            V16.3            FAMILY HX-
BREAST MALIG                     

 

 10/25/2017                         Ot            286.9            COAGULAT 
DEFECT NEC/NOS                     

 

 10/25/2017                         Ot            443.9            PERIPH 
VASCULAR DIS NOS                     

 

 10/25/2017                         Ot            V16.3            FAMILY HX-
BREAST MALIG                     

 

 10/25/2017                         Ot            V58.66            LONG-TERM (
CURRENT) USE OF ASPIRIN                     

 

 10/25/2017                         Ot            V58.69            OTH MED,LT,
CURRENT USE                     

 

 10/25/2017                         Ot            443.9            PERIPH 
VASCULAR DIS NOS                     

 

 10/25/2017                         Ot            729.81            SWELLING OF 
LIMB                     

 

 10/25/2017                         Ot            782.3            EDEMA       
              

 

 10/25/2017                         Ot            285.9            ANEMIA NOS  
                   

 

 10/25/2017                         Ot            V16.3            FAMILY HX-
BREAST MALIG                     

 

 10/25/2017                         Ot            V16.41            FAM HX-MAL 
NEOP-OVARY                     

 

 10/25/2017                         Ot            V58.61            
ANTICOAGULANTS,LT,CURRENT USE                     

 

 10/25/2017                         Ot            786.50            CHEST PAIN 
NOS                     

 

 10/25/2017                         Ot            789.06            ABDOMINAL 
PAIN, EPIGASTRIC                     

 

 10/25/2017                         Ot            786.50            CHEST PAIN 
NOS                     

 

 10/25/2017                         Ot            789.06            ABDOMINAL 
PAIN, EPIGASTRIC                     

 

 10/25/2017                         Ot            V12.51            HX-VENOUS 
THROMBOSIS EMBOLISM                     

 

 10/25/2017                         Ot            V12.71            PERSONAL 
HISTORY OF PEPTIC ULCER DISEASE                     

 

 10/25/2017                         Ot            V58.69            OTH MED,LT,
CURRENT USE                     

 

 10/25/2017                         Ot            789.05            ABDOMINAL 
PAIN, PERIUMBILIC                     

 

 10/25/2017            DOUGLAS LARA, STEPHY CARDONA            Ot            244.9        
    HYPOTHYROIDISM NOS                     

 

 10/25/2017            DOUGLAS LARA, STEPHY CARDONA            Ot            336.9        
    SPINAL CORD DISEASE NOS                     

 

 10/25/2017            STEPHY COLE MD            Ot            722.10       
     LUMBAR DISC DISPLACEMENT                     

 

 10/25/2017            STEPHY COLE MD            Ot            722.11       
     THORACIC DISC DISPLACMNT                     

 

 10/25/2017            DOUGLAS LARA, STEPHY CARDONA            Ot            793.89       
     OTH (ABN) FINDINGS ON RADIOLOGICAL EXAMI                     

 

 10/25/2017            STEPHY COLE MD            Ot            V16.3        
    FAMILY HX-BREAST MALIG                     

 

 10/25/2017            STEPHY COLE MD            Ot            244.9        
    HYPOTHYROIDISM NOS                     

 

 10/25/2017            STEPHY COLE MD            Ot            729.5        
    PAIN IN LIMB                     

 

 10/25/2017            STEPHY COLE MD            Ot            V12.51       
     HX-VENOUS THROMBOSIS EMBOLISM                     

 

 10/25/2017            STEPHY COLE MD            Ot            440.20       
     ATHEROSCLEROSIS NATIVE ARTERIES EXTREMIT                     

 

 10/25/2017            STEPHY COLE MD            Ot            440.4        
    CHRONIC TOTAL OCCLUSION OF ARTERY OF THE                     

 

 10/25/2017            STEPHY COLE MD            Ot            729.5        
    PAIN IN LIMB                     

 

 10/25/2017            STEPHY COLE MD            Ot            V12.51       
     HX-VENOUS THROMBOSIS EMBOLISM                     

 

 10/25/2017            STEPHANIE LARA, BRENDA TILLEY            Ot            M25.572   
         PAIN IN LEFT ANKLE AND JOINTS OF LEFT FO                     

 

 10/25/2017            BRENDA BROWN MD            Ot            M79.672   
         PAIN IN LEFT FOOT                     

 

 10/25/2017            BRENDA BROWN MD            Ot            N63.11    
        UNSPECIFIED LUMP IN THE RIGHT BREAST, UP                     

 

 10/30/2017            BRENDA BROWN MD            Ot            K40.90    
        UNIL INGUINAL HERNIA, W/O OBST OR GANGR,                     

 

 10/30/2017            BRENDA BROWN MD            Ot            N36.9     
       URETHRAL DISORDER, UNSPECIFIED                     

 

 2017            BRENDA BROWN MD            Ot            K40.90    
        UNIL INGUINAL HERNIA, W/O OBST OR GANGR,                     

 

 2017            BRENDA BROWN MD            Ot            N36.9     
       URETHRAL DISORDER, UNSPECIFIED                     

 

 2017            TAWIL MD, ANGELA QUIÑONEZ            Ot            N20.0        
    CALCULUS OF KIDNEY                     

 

 2018            VICENTE LIN N APRN            Ot            R05       
     COUGH                     

 

 2018                         Ot            285.9            ANEMIA NOS  
                   

 

 2018                         Ot            V16.3            FAMILY HX-
BREAST MALIG                     

 

 2018                         Ot            V16.41            FAM HX-MAL 
NEOP-OVARY                     

 

 2018                         Ot            V58.61            
ANTICOAGULANTS,LT,CURRENT USE                     

 

 2018                         Ot            786.50            CHEST PAIN 
NOS                     

 

 2018                         Ot            789.06            ABDOMINAL 
PAIN, EPIGASTRIC                     

 

 2018                         Ot            786.50            CHEST PAIN 
NOS                     

 

 2018                         Ot            789.06            ABDOMINAL 
PAIN, EPIGASTRIC                     

 

 2018                         Ot            V12.51            HX-VENOUS 
THROMBOSIS EMBOLISM                     

 

 2018                         Ot            V12.71            PERSONAL 
HISTORY OF PEPTIC ULCER DISEASE                     

 

 2018                         Ot            V58.69            OTH MED,LT,
CURRENT USE                     

 

 2018                         Ot            789.05            ABDOMINAL 
PAIN, PERIUMBILIC                     

 

 2018            DOUGLAS LARA, STEPHY CARDONA            Ot            244.9        
    HYPOTHYROIDISM NOS                     

 

 2018            DOUGLAS LARA, STEPHY CARDONA            Ot            336.9        
    SPINAL CORD DISEASE NOS                     

 

 2018            DOUGLAS LARA, STEPHY CARDONA            Ot            722.10       
     LUMBAR DISC DISPLACEMENT                     

 

 2018            DOUGLAS LARA, STEPHY CARDONA            Ot            722.11       
     THORACIC DISC DISPLACMNT                     

 

 2018            DOUGLAS LARA, STEPHY CARDONA            Ot            793.89       
     OTH (ABN) FINDINGS ON RADIOLOGICAL EXAMI                     

 

 2018            STEPHY COLE MD            Ot            V16.3        
    FAMILY HX-BREAST MALIG                     

 

 2018            STEPHY COLE MD            Ot            244.9        
    HYPOTHYROIDISM NOS                     

 

 2018            DOUGLAS LARA, STEPHY CARDONA            Ot            729.5        
    PAIN IN LIMB                     

 

 2018            STEPHY COLE MD            Ot            V12.51       
     HX-VENOUS THROMBOSIS EMBOLISM                     

 

 2018            STEPHY COLE MD            Ot            440.20       
     ATHEROSCLEROSIS NATIVE ARTERIES EXTREMIT                     

 

 2018            STEPHY COLE MD            Ot            440.4        
    CHRONIC TOTAL OCCLUSION OF ARTERY OF THE                     

 

 2018            STEPHY COLE MD            Ot            729.5        
    PAIN IN LIMB                     

 

 2018            STEPHY COLE MD            Ot            V12.51       
     HX-VENOUS THROMBOSIS EMBOLISM                     

 

 2018            STEPHANIE LARA, BRENDA TILLEY            Ot            M25.572   
         PAIN IN LEFT ANKLE AND JOINTS OF LEFT FO                     

 

 2018            BRENDA BROWN MD            Ot            M79.672   
         PAIN IN LEFT FOOT                     

 

 2018            BRENDA BROWN MD            Ot            N63.11    
        UNSPECIFIED LUMP IN THE RIGHT BREAST, UP                     

 

 2018            BRENDA BROWN MD            Ot            K40.90    
        UNIL INGUINAL HERNIA, W/O OBST OR GANGR,                     

 

 2018            BRENDA BROWN MD            Ot            N36.9     
       URETHRAL DISORDER, UNSPECIFIED                     

 

 2018            TAWIL MD, ANGELA QUIÑONEZ            Ot            N20.0        
    CALCULUS OF KIDNEY                     

 

 2018            VICENTE LIN N APRN            Ot            R05       
     COUGH                     

 

 2018            BRENDA BROWN MD            Ot            K21.9     
       GASTRO-ESOPHAGEAL REFLUX DISEASE WITHOUT                     

 

 2018            BRENDA BROWN MD            Ot            K22.4     
       DYSKINESIA OF ESOPHAGUS                     

 

 2018            BRENDA BROWN MD            Ot            K21.9     
       GASTRO-ESOPHAGEAL REFLUX DISEASE WITHOUT                     

 

 2018            BRENDA BROWN MD            Ot            K22.4     
       DYSKINESIA OF ESOPHAGUS                     

 

 2018                         Ot            786.50            CHEST PAIN 
NOS                     

 

 2018                         Ot            789.06            ABDOMINAL 
PAIN, EPIGASTRIC                     

 

 2018                         Ot            786.50            CHEST PAIN 
NOS                     

 

 2018                         Ot            789.06            ABDOMINAL 
PAIN, EPIGASTRIC                     

 

 2018                         Ot            V12.51            HX-VENOUS 
THROMBOSIS EMBOLISM                     

 

 2018                         Ot            V12.71            PERSONAL 
HISTORY OF PEPTIC ULCER DISEASE                     

 

 2018                         Ot            V58.69            OTH MED,LT,
CURRENT USE                     

 

 2018                         Ot            789.05            ABDOMINAL 
PAIN, PERIUMBILIC                     

 

 2018            STEPHY COLE MD            Ot            244.9        
    HYPOTHYROIDISM NOS                     

 

 2018            STEPHY COLE MD            Ot            336.9        
    SPINAL CORD DISEASE NOS                     

 

 2018            STEPHY COLE MD            Ot            722.10       
     LUMBAR DISC DISPLACEMENT                     

 

 2018            STEPHY COLE MD            Ot            722.11       
     THORACIC DISC DISPLACMNT                     

 

 2018            STEPHY COLE MD            Ot            793.89       
     OTH (ABN) FINDINGS ON RADIOLOGICAL EXAMI                     

 

 2018            STEPHY COLE MD            Ot            V16.3        
    FAMILY HX-BREAST MALIG                     

 

 2018            STEPHY COLE MD            Ot            244.9        
    HYPOTHYROIDISM NOS                     

 

 2018            STEPHY COLE MD            Ot            729.5        
    PAIN IN LIMB                     

 

 2018            STEPHY COLE MD            Ot            V12.51       
     HX-VENOUS THROMBOSIS EMBOLISM                     

 

 2018            STEPHY COLE MD            Ot            440.20       
     ATHEROSCLEROSIS NATIVE ARTERIES EXTREMIT                     

 

 2018            STEPHY COLE MD            Ot            440.4        
    CHRONIC TOTAL OCCLUSION OF ARTERY OF THE                     

 

 2018            STEPHY COLE MD            Ot            729.5        
    PAIN IN LIMB                     

 

 2018            STEPHY COLE MD            Ot            V12.51       
     HX-VENOUS THROMBOSIS EMBOLISM                     

 

 2018            BRENDA BROWN MD            Ot            M25.572   
         PAIN IN LEFT ANKLE AND JOINTS OF LEFT FO                     

 

 2018            BRENDA BROWN MD            Ot            M79.672   
         PAIN IN LEFT FOOT                     

 

 2018            BRENDA BROWN MD            Ot            N63.11    
        UNSPECIFIED LUMP IN THE RIGHT BREAST, UP                     

 

 2018            BRENDA BROWN MD            Ot            K40.90    
        UNIL INGUINAL HERNIA, W/O OBST OR GANGR,                     

 

 2018            BRENDA BROWN MD            Ot            N36.9     
       URETHRAL DISORDER, UNSPECIFIED                     

 

 2018            TAWIL MD, ANGELA QUIÑONEZ            Ot            N20.0        
    CALCULUS OF KIDNEY                     

 

 2018            VICENTE LIN N APRN            Ot            R05       
     COUGH                     

 

 2018            BRENDA BROWN MD            Ot            K21.9     
       GASTRO-ESOPHAGEAL REFLUX DISEASE WITHOUT                     

 

 2018            BRENDA BROWN MD            Ot            K22.4     
       DYSKINESIA OF ESOPHAGUS                     

 

 2018                         Ot            786.50            CHEST PAIN 
NOS                     

 

 2018                         Ot            789.06            ABDOMINAL 
PAIN, EPIGASTRIC                     

 

 2018                         Ot            786.50            CHEST PAIN 
NOS                     

 

 2018                         Ot            789.06            ABDOMINAL 
PAIN, EPIGASTRIC                     

 

 2018                         Ot            V12.51            HX-VENOUS 
THROMBOSIS EMBOLISM                     

 

 2018                         Ot            V12.71            PERSONAL 
HISTORY OF PEPTIC ULCER DISEASE                     

 

 2018                         Ot            V58.69            OTH MED,LT,
CURRENT USE                     

 

 2018                         Ot            789.05            ABDOMINAL 
PAIN, PERIUMBILIC                     

 

 2018            STEPHY COLE MD            Ot            244.9        
    HYPOTHYROIDISM NOS                     

 

 2018            STEPHY COLE MD            Ot            336.9        
    SPINAL CORD DISEASE NOS                     

 

 2018            STEPHY COLE MD            Ot            722.10       
     LUMBAR DISC DISPLACEMENT                     

 

 2018            STEPHY COLE MD            Ot            722.11       
     THORACIC DISC DISPLACMNT                     

 

 2018            STEPHY COLE MD            Ot            793.89       
     OTH (ABN) FINDINGS ON RADIOLOGICAL EXAMI                     

 

 2018            STEPHY COLE MD            Ot            V16.3        
    FAMILY HX-BREAST MALIG                     

 

 2018            STEPHY COLE MD            Ot            244.9        
    HYPOTHYROIDISM NOS                     

 

 2018            STEPHY COLE MD            Ot            729.5        
    PAIN IN LIMB                     

 

 2018            STEPHY COLE MD            Ot            V12.51       
     HX-VENOUS THROMBOSIS EMBOLISM                     

 

 2018            STEPHY COLE MD            Ot            440.20       
     ATHEROSCLEROSIS NATIVE ARTERIES EXTREMIT                     

 

 2018            STEPHY COLE MD            Ot            440.4        
    CHRONIC TOTAL OCCLUSION OF ARTERY OF THE                     

 

 2018            STEPHY COLE MD            Ot            729.5        
    PAIN IN LIMB                     

 

 2018            STEPHY COLE MD            Ot            V12.51       
     HX-VENOUS THROMBOSIS EMBOLISM                     

 

 2018            STEPHANIE LARA, BRENDA TILLEY            Ot            M25.572   
         PAIN IN LEFT ANKLE AND JOINTS OF LEFT FO                     

 

 2018            BRENDA BROWN MD            Ot            M79.672   
         PAIN IN LEFT FOOT                     

 

 2018            BRENDA BROWN MD            Ot            N63.11    
        UNSPECIFIED LUMP IN THE RIGHT BREAST, UP                     

 

 2018            BRENDA BROWN MD            Ot            K40.90    
        UNIL INGUINAL HERNIA, W/O OBST OR GANGR,                     

 

 2018            BRENDA BROWN MD            Ot            N36.9     
       URETHRAL DISORDER, UNSPECIFIED                     

 

 2018            TAWIL MD, ANGELA QUIÑONEZ            Ot            N20.0        
    CALCULUS OF KIDNEY                     

 

 2018            VICENTE ILN APRN            Ot            R05       
     COUGH                     

 

 2018            BRENDA BROWN MD            Ot            K21.9     
       GASTRO-ESOPHAGEAL REFLUX DISEASE WITHOUT                     

 

 2018            BRENDA BROWN MD            Ot            K22.4     
       DYSKINESIA OF ESOPHAGUS                     

 

 2018                         Ot            786.50            CHEST PAIN 
NOS                     

 

 2018                         Ot            789.06            ABDOMINAL 
PAIN, EPIGASTRIC                     

 

 2018                         Ot            786.50            CHEST PAIN 
NOS                     

 

 2018                         Ot            789.06            ABDOMINAL 
PAIN, EPIGASTRIC                     

 

 2018                         Ot            V12.51            HX-VENOUS 
THROMBOSIS EMBOLISM                     

 

 2018                         Ot            V12.71            PERSONAL 
HISTORY OF PEPTIC ULCER DISEASE                     

 

 2018                         Ot            V58.69            OTH MED,LT,
CURRENT USE                     

 

 2018                         Ot            789.05            ABDOMINAL 
PAIN, PERIUMBILIC                     

 

 2018            DOUGLAS LARA, STEPHY CARDONA            Ot            244.9        
    HYPOTHYROIDISM NOS                     

 

 2018            DOUGLAS LARA, STEPHY CARDONA            Ot            336.9        
    SPINAL CORD DISEASE NOS                     

 

 2018            STEPHY COLE MD            Ot            722.10       
     LUMBAR DISC DISPLACEMENT                     

 

 2018            STEPHY COLE MD            Ot            722.11       
     THORACIC DISC DISPLACMNT                     

 

 2018            STEPHY COLE MD            Ot            793.89       
     OTH (ABN) FINDINGS ON RADIOLOGICAL EXAMI                     

 

 2018            STEPHY COLE MD            Ot            V16.3        
    FAMILY HX-BREAST MALIG                     

 

 2018            STEPHY COLE MD            Ot            244.9        
    HYPOTHYROIDISM NOS                     

 

 2018            STEPHY COLE MD            Ot            729.5        
    PAIN IN LIMB                     

 

 2018            STEPHY COLE MD            Ot            V12.51       
     HX-VENOUS THROMBOSIS EMBOLISM                     

 

 2018            STEPHY COLE MD            Ot            440.20       
     ATHEROSCLEROSIS NATIVE ARTERIES EXTREMIT                     

 

 2018            STEPHY COLE MD            Ot            440.4        
    CHRONIC TOTAL OCCLUSION OF ARTERY OF THE                     

 

 2018            STEPHY COLE MD            Ot            729.5        
    PAIN IN LIMB                     

 

 2018            STEPHY COLE MD            Ot            V12.51       
     HX-VENOUS THROMBOSIS EMBOLISM                     

 

 2018            BRENDA BROWN MD            Ot            M25.572   
         PAIN IN LEFT ANKLE AND JOINTS OF LEFT FO                     

 

 2018            BRENDA BROWN MD            Ot            M79.672   
         PAIN IN LEFT FOOT                     

 

 2018            BRENDA BROWN MD            Ot            N63.11    
        UNSPECIFIED LUMP IN THE RIGHT BREAST, UP                     

 

 2018            BRENDA BROWN MD            Ot            K40.90    
        UNIL INGUINAL HERNIA, W/O OBST OR GANGR,                     

 

 2018            BRENDA BROWN MD            Ot            N36.9     
       URETHRAL DISORDER, UNSPECIFIED                     

 

 2018            TAWIL MD, ANGELA QUIÑONEZ            Ot            N20.0        
    CALCULUS OF KIDNEY                     

 

 2018            VICENTE LIN N APRN            Ot            R05       
     COUGH                     

 

 2018            BRENDA BROWN MD            Ot            K21.9     
       GASTRO-ESOPHAGEAL REFLUX DISEASE WITHOUT                     

 

 2018            BRENDA BROWN MD            Ot            K22.4     
       DYSKINESIA OF ESOPHAGUS                     

 

 2018            BRENDA BROWN MD            Ot            N63.11    
        UNSPECIFIED LUMP IN THE RIGHT BREAST, UP                     

 

 2018            BRENDA BROWN MD            Ot            N64.59    
        OTHER SIGNS AND SYMPTOMS IN BREAST                     

 

 2018            BRENDA BROWN MD            Ot            Z98.82    
        BREAST IMPLANT STATUS                     

 

 2018            BRENDA BROWN MD            Ot            N63.11    
        UNSPECIFIED LUMP IN THE RIGHT BREAST, UP                     

 

 2018            BRENDA BROWN MD            Ot            N64.59    
        OTHER SIGNS AND SYMPTOMS IN BREAST                     

 

 2018            BRENDA BROWN MD            Ot            Z98.82    
        BREAST IMPLANT STATUS                     

 

 2018            BRENDA BROWN MD            Ot            N63.11    
        UNSPECIFIED LUMP IN THE RIGHT BREAST, UP                     

 

 2018            BRENDA BROWN MD            Ot            N64.59    
        OTHER SIGNS AND SYMPTOMS IN BREAST                     

 

 2018            BRENDA BROWN MD            Ot            Z98.82    
        BREAST IMPLANT STATUS                     

 

 2018            MARINO KENNEDY MD            Ot            Z01.818       
     ENCOUNTER FOR OTHER PREPROCEDURAL EXAMIN                     

 

 2018            MARINO KENNEDY MD, Ot            Z01.818       
     ENCOUNTER FOR OTHER PREPROCEDURAL EXAMIN                     

 

 2018            MARINO KENNEDY MD            Ot            E06.3         
   AUTOIMMUNE THYROIDITIS                     

 

 2018            MARINO KENNEDY MD            Ot            E78.00        
    PURE HYPERCHOLESTEROLEMIA, UNSPECIFIED                     

 

 2018            MARINO KENNEDY MD            Ot            K21.0         
   GASTRO-ESOPHAGEAL REFLUX DISEASE WITH ES                     

 

 2018            MARINO KENNEDY MD            Ot            K29.70        
    GASTRITIS, UNSPECIFIED, WITHOUT BLEEDING                     

 

 2018            MARINO KENNEDY MD            Ot            K44.9         
   DIAPHRAGMATIC HERNIA WITHOUT OBSTRUCTION                     

 

 2018            MARINO KENNEDY MD            Ot            K64.1         
   SECOND DEGREE HEMORRHOIDS                     

 

 2018            MARINO KENNEDY MD            Ot            Z12.11        
    ENCOUNTER FOR SCREENING FOR MALIGNANT NE                     

 

 2018            MARINO KENNEDY MD, Ot            Z79.82        
    LONG TERM (CURRENT) USE OF ASPIRIN                     

 

 2018            MARINO KENNEDY MD            Ot            Z79.899       
     OTHER LONG TERM (CURRENT) DRUG THERAPY                     

 

 2018            MARINO KENNEDY MD, Ot            Z86.718       
     PERSONAL HISTORY OF OTHER VENOUS THROMBO                     

 

 2018            MARINO KENNEDY MD            Ot            E06.3         
   AUTOIMMUNE THYROIDITIS                     

 

 2018            MARINO KENNEDY MD            Ot            E78.00        
    PURE HYPERCHOLESTEROLEMIA, UNSPECIFIED                     

 

 2018            MARINO KENNEDY MD            Ot            K21.0         
   GASTRO-ESOPHAGEAL REFLUX DISEASE WITH ES                     

 

 2018            MARINO KENNEDY MD            Ot            K29.70        
    GASTRITIS, UNSPECIFIED, WITHOUT BLEEDING                     

 

 2018            MARINO KENNEDY MD            Ot            K44.9         
   DIAPHRAGMATIC HERNIA WITHOUT OBSTRUCTION                     

 

 2018            MARINO KENNEDY MD            Ot            K64.1         
   SECOND DEGREE HEMORRHOIDS                     

 

 2018            MARINO KENNEDY MD            Ot            Z12.11        
    ENCOUNTER FOR SCREENING FOR MALIGNANT NE                     

 

 2018            MARINO KENNEDY MD, Ot            Z79.82        
    LONG TERM (CURRENT) USE OF ASPIRIN                     

 

 2018            MARINO KENNEDY MD, Ot            Z79.899       
     OTHER LONG TERM (CURRENT) DRUG THERAPY                     

 

 2018            MARINO KENNEDY MD, Ot            Z86.718       
     PERSONAL HISTORY OF OTHER VENOUS THROMBO                     

 

 2018            DOUGLAS LARA, STEPHY CARDONA            Ot            336.9        
    SPINAL CORD DISEASE NOS                     

 

 2018            STEPHY COLE MD            Ot            722.10       
     LUMBAR DISC DISPLACEMENT                     

 

 2018            STEPHY COLE MD            Ot            722.11       
     THORACIC DISC DISPLACMNT                     

 

 2018            STEPHY COLE MD            Ot            793.89       
     OTH (ABN) FINDINGS ON RADIOLOGICAL EXAMI                     

 

 2018            STEPHY COLE MD            Ot            V16.3        
    FAMILY HX-BREAST MALIG                     

 

 2018            STEPHY COLE MD            Ot            244.9        
    HYPOTHYROIDISM NOS                     

 

 2018            STEPHY COLE MD            Ot            729.5        
    PAIN IN LIMB                     

 

 2018            STEPHY COLE MD            Ot            V12.51       
     HX-VENOUS THROMBOSIS EMBOLISM                     

 

 2018            STEPHY COLE MD            Ot            440.20       
     ATHEROSCLEROSIS NATIVE ARTERIES EXTREMIT                     

 

 2018            STEPHY COLE MD            Ot            440.4        
    CHRONIC TOTAL OCCLUSION OF ARTERY OF THE                     

 

 2018            STEPHY COLE MD            Ot            729.5        
    PAIN IN LIMB                     

 

 2018            STEPHY COLE MD            Ot            V12.51       
     HX-VENOUS THROMBOSIS EMBOLISM                     

 

 2018            BRENDA BROWN MD            Ot            M25.572   
         PAIN IN LEFT ANKLE AND JOINTS OF LEFT FO                     

 

 2018            BRENDA BROWN MD, Ot            M79.672   
         PAIN IN LEFT FOOT                     

 

 2018            BRENDA BROWN MD            Ot            N63.11    
        UNSPECIFIED LUMP IN THE RIGHT BREAST, UP                     

 

 2018            BRENDA BROWN MD            Ot            K40.90    
        UNIL INGUINAL HERNIA, W/O OBST OR GANGR,                     

 

 2018            BRENDA BROWN MD, Ot            N36.9     
       URETHRAL DISORDER, UNSPECIFIED                     

 

 2018            TAWIL MD, ANGELA QUIÑONEZ            Ot            N20.0        
    CALCULUS OF KIDNEY                     

 

 2018            VICENTE LIN APRN            Ot            R05       
     COUGH                     

 

 2018            BRENDA BROWN MD            Ot            K21.9     
       GASTRO-ESOPHAGEAL REFLUX DISEASE WITHOUT                     

 

 2018            BRENDA BROWN MD            Ot            K22.4     
       DYSKINESIA OF ESOPHAGUS                     

 

 2018            BRENDA BROWN MD, Ot            N63.11    
        UNSPECIFIED LUMP IN THE RIGHT BREAST, UP                     

 

 2018            BRENDA BROWN MD, Ot            N64.59    
        OTHER SIGNS AND SYMPTOMS IN BREAST                     

 

 2018            BRENDA BROWN MD, Ot            Z98.82    
        BREAST IMPLANT STATUS                     

 

 2018            BRENT LARA, MARINO            Ot            Z01.818       
     ENCOUNTER FOR OTHER PREPROCEDURAL EXAMIN                     



                                                                               
                                                                               
                                                                               
                                                                               
                                                                               
                                                                               
                                                                               
                                                                               
                                                                               
                                                                               
                                                                               
                                                                               
                                                                               
                                                                               
                                                                               
                                                                 



Procedures

      





 Code            Description            Performed By            Performed On   
     

 

             86.04                                  OTHER SKIN   SUBQ I   D    
                               2010        

 

             88.42                                  CONTRAST AORTOGRAM         
                          2010        

 

             88.48                                  CONTRAST ARTERIOGRAM-LEG   
                                2010        

 

             86.59                                  CLOSURE SKIN   SUBCUTANEOUS 
NEC                                   2010        

 

             38.91                                  ARTERIAL CATHETERIZATION   
                                2010        

 

             88.42                                  CONTRAST AORTOGRAM         
                          2010        

 

             88.48                                  CONTRAST ARTERIOGRAM-LEG   
                                2010        

 

             99.10                                  INJECT/INFUSE THROMBOLYTIC 
AGENT                                   2010        

 

             38.08                                  LOWER LIMB ARTERY INCIS    
                               2010        

 

             38.91                                  ARTERIAL CATHETERIZATION   
                                2010        

 

             38.93                                  VENOUS CATHETERIZATION NEC 
                                  2010        

 

             88.48                                  CONTRAST ARTERIOGRAM-LEG   
                                2010        

 

             99.10                                  INJECT/INFUSE THROMBOLYTIC 
AGENT                                   2010        



                                          



Results

      



There is no data.              



Encounters

      





 ACCT No.            Visit Date/Time            Discharge            Status    
        Pt. Type            Provider            Facility            Loc./Unit  
          Complaint        

 

 [id number]            2018 12:03:00            2018 12:43:00    
        DIS            Outpatient            MARINO KENNEDY MD            [facility]            PREOP            LEFT INGUINAL HERNIA/
EGD        

 

 [id number]            2018 09:01:00            2018 13:15:00    
        DIS            Outpatient            MARINO KENNEDY MD            [facility]            ENDO            SCREENING/EPIGASTRIC 
PAIN        

 

 [id number]            2018 05:52:00            2018 15:07:00    
        DIS            Outpatient            MARINO KENNEDY MD            [facility]            PREOP            COLONOSCOPY/EGD        

 

 [id number]            2018 07:54:00            2018 23:59:59    
        CLS            Outpatient            BRENDA BROWN MD            
[facility]            RAD            RT BREAST LUMP      
  

 

 [id number]            2018 10:51:00            2018 23:59:59    
        CLS            Outpatient            BRENDA BROWN MD            
[facility]            RAD            GASTROESOPHAGEAL 
REFLUX DISEASE WO ESOPHAGITIS        

 

 [id number]            2018 15:19:00            2018 23:59:59    
        CLS            Outpatient            VICENTE LIN            
[facility]            RAD            R05        

 

 [id number]            11/15/2017 12:41:00            11/15/2017 23:59:59    
        CLS            Outpatient            TAWIL MD, ELIAS A            [facility]            RAD            LT RENAL STONE        

 

 [id number]            10/27/2017 10:06:00            10/27/2017 23:59:59    
        CLS            Outpatient            BRENDA BROWN MD            
[facility]            RAD            R10.32 LEFT LOWER 
QUADRANT PAIN        

 

 [id number]            10/06/2017 08:11:00            10/06/2017 23:59:59    
        CLS            Outpatient            BRENDA BROWN MD            
[facility]            RAD            BREAST LUMP        

 

 [id number]            2017 11:48:00            2017 23:59:59    
        CLS            Outpatient            BRENDA BROWN MD            
[facility]            RAD            M79.672        

 

 [id number]            2017 10:31:00            2017 23:59:59    
        CLS            Outpatient            STEPHANIE LARA, BRENDA TILLEY            
[facility]            RAD            M25.572        

 

 [id number]            2015 12:27:00            2015 23:59:59    
        CLS            Outpatient            STEPHY COLE MD            [facility]            RAD            DVT        

 

 [id number]            2015 12:15:00            2015 23:59:59    
        CLS            Preadmit            STEPHY COLE MD            [facility]            RAD                     

 

 [id number]            2015 14:10:00            2015 23:59:59    
        CLS            Outpatient            STEPHY COLE MD            [facility]            RAD            LEFT UPPER LEG PAIN, HX 
OF DVT        

 

 [id number]            2014 13:31:00            2014 23:59:59    
        CLS            Outpatient            STEPHY COLE MD            [facility]            RAD            HYPOTHYROID, ENLARGED 
FIRM THYROID        

 

 [id number]            2014 22:35:00            2014 14:15:00    
        DIS            Inpatient            STEPHY COLE MD            [facility]            4TH            ANEMIA,HYPOKALEMIA,
INFLUENTZA-LIKE ILLNESS        

 

 [id number]            2013 13:34:00            2013 23:59:59    
        CLS            Outpatient            STEPHY COLE MD            [facility]            RAD            STRONG FAM HX OF BREAST 
CA, INVERTED NIPPLE, LUMP        

 

 [id number]            2013 14:30:00            10/08/2013 08:42:00    
        DIS            Outpatient            STEPHY COLE MD            [facility]            REHAB            NECK,UPPER BACK,LUMBAR 
PAIN,CERVICAL STENOSIS        

 

 [id number]            2013 14:37:00            2013 17:16:00    
        DIS            Emergency            RISA MCKEON MD            [facility]            ER            MULTIPLE COMPLAINTS      
  

 

 [id number]            2013 13:20:00            2013 23:59:59    
        CLS            Outpatient                                              
              

 

 [id number]            2013 08:06:00            2013 23:59:59    
        CLS            Outpatient            STEPHY COLE MD            [facility]            RAD            C SPINE UPPER BACK NECK 
CASSI, LOW BACK PAIN,        

 

 [id number]            2013 13:08:00            2013 23:59:59    
        CLS            Outpatient            STEPHY COLE MD            [facility]            RAD            HYPOTHYROIDISM        

 

 [id number]            2018 09:06:00                         ACT       
     Outpatient            MARINO KENNEDY MD            [facility]            SDC            INCISIONAL HERNIA, LEFT INGUINAL HERNIA /
DYSHAGIA        

 

 [id number]            2018 14:48:00                                   
   Document Registration                                                       
     

 

 [id number]            2018 14:48:00                                   
   Document Registration                                                       
     

 

 [id number]            2015 14:10:00                                   
   Document Registration                                                       
     

 

 [id number]            2015 14:10:00                                   
   Document Registration                                                       
     

 

 [id number]            2013 08:09:00                                   
   Document Registration                                                       
     

 

 [id number]            03/15/2013 10:09:00                                   
   Document Registration                                                       
     

 

 [id number]            2013 12:24:00                                   
   Document Registration                                                       
     

 

 [id number]            2012 00:00:00                                   
   Document Registration                                                       
     

 

 [id number]            2012 10:27:00                                   
   Document Registration                                                       
     

 

 [id number]            2012 08:34:00                                   
   Document Registration                                                       
     

 

 [id number]            2012 10:33:00                                   
   Document Registration                                                       
     

 

 [id number]            2012 08:43:00                                   
   Document Registration                                                       
     

 

 [id number]            2012 13:24:00                                   
   Document Registration                                                       
     

 

 [id number]            05/10/2012 10:11:00                                   
   Document Registration                                                       
     

 

 [id number]            2012 13:24:00                                   
   Document Registration                                                       
     

 

 [id number]            2012 05:39:00                                   
   Document Registration                                                       
     

 

 [id number]            2012 14:18:00                                   
   Document Registration                                                       
     

 

 [id number]            2012 11:54:00                                   
   Document Registration                                                       
     

 

 [id number]            2012 10:28:00                                   
   Document Registration                                                       
     

 

 [id number]            2011 09:14:00                                   
   Document Registration                                                       
     

 

 [id number]            2011 09:23:00                                   
   Document Registration                                                       
     

 

 [id number]            2011 10:43:00                                   
   Document Registration                                                       
     

 

 [id number]            2011 10:08:00                                   
   Document Registration                                                       
     

 

 [id number]            10/20/2010 09:13:00                                   
   Document Registration                                                       
     

 

 [id number]            10/07/2010 11:44:00                                   
   Document Registration                                                       
     

 

 [id number]            08/10/2010 14:00:00                                   
   Document Registration                                                       
     

 

 [id number]            2010 14:17:00                                   
   Document Registration                                                       
     

 

 [id number]            2010 10:08:00                                   
   Document Registration                                                       
     

 

 [id number]            2010 11:33:00                                   
   Document Registration                                                       
     

 

 [id number]            2010 05:43:00                                   
   Document Registration                                                       
     

 

 [id number]            2010 10:00:00                                   
   Document Registration                                                       
     

 

 [id number]            2010 10:14:00                                   
   Document Registration                                                       
     

 

 [id number]            2010 13:14:00                                   
   Document Registration                                                       
     

 

 [id number]            2010 09:22:00                                   
   Document Registration                                                       
     

 

 [id number]            2015 18:03:50                         ACT            
Document Registration

## 2022-11-08 NOTE — BH TRANSITION RECORD
Contact Information: If you have any questions, concerns, pended studies, tests and/or procedures, or emergencies regarding your inpatient behavioral health visit  Please contact [facility] older adult behavioral health unit 6T [phone] and ask to speak to a , nurse or physician  A contact is available 24 hours/ 7 days a week at this number  Summary of Procedures Performed During your Stay:  Below is a list of major procedures performed during your hospital stay and a summary of results:  - No major procedures performed  Pending Studies (From admission, onward)    None        Please follow up on the above pending studies with your PCP and/or referring provider

## 2022-11-08 NOTE — PLAN OF CARE
Problem: SAFETY ADULT  Goal: Patient will remain free of falls  Description: INTERVENTIONS:  - Educate patient/family on patient safety including physical limitations  - Instruct patient to call for assistance with activity   - Consult OT/PT to assist with strengthening/mobility   - Keep Call bell within reach  - Keep bed low and locked with side rails adjusted as appropriate  - Keep care items and personal belongings within reach  - Initiate and maintain comfort rounds  - Make Fall Risk Sign visible to staff  - Obtain necessary fall risk management equipment:   - Apply yellow socks and bracelet for high fall risk patients  - Consider moving patient to room near nurses station  Outcome: Adequate for Discharge  Goal: Maintain or return to baseline ADL function  Description: INTERVENTIONS:  -  Assess patient's ability to carry out ADLs; assess patient's baseline for ADL function and identify physical deficits which impact ability to perform ADLs (bathing, care of mouth/teeth, toileting, grooming, dressing, etc )  - Assess/evaluate cause of self-care deficits   - Assess range of motion  - Assess patient's mobility; develop plan if impaired  - Assess patient's need for assistive devices and provide as appropriate  - Encourage maximum independence but intervene and supervise when necessary  - Involve family in performance of ADLs  - Assess for home care needs following discharge   - Consider OT consult to assist with ADL evaluation and planning for discharge  - Provide patient education as appropriate  Outcome: Adequate for Discharge  Goal: Maintains/Returns to pre admission functional level  Description: INTERVENTIONS:  - Perform BMAT or MOVE assessment daily    - Set and communicate daily mobility goal to care team and patient/family/caregiver     - Collaborate with rehabilitation services on mobility goals if consulted  - Out of bed for toileting  - Record patient progress and toleration of activity level   Outcome: Adequate for Discharge     Problem: Ineffective Coping  Goal: Cooperates with admission process  Description: Interventions:   - Complete admission process  Outcome: Adequate for Discharge  Goal: Identifies ineffective coping skills  Outcome: Adequate for Discharge  Goal: Identifies healthy coping skills  Outcome: Adequate for Discharge  Goal: Demonstrates healthy coping skills  Outcome: Adequate for Discharge  Goal: Participates in unit activities  Description: Interventions:  - Provide therapeutic environment   - Provide required programming   - Redirect inappropriate behaviors   Outcome: Adequate for Discharge  Goal: Patient/Family participate in treatment and DC plans  Description: Interventions:  - Provide therapeutic environment  Outcome: Adequate for Discharge  Goal: Patient/Family verbalizes awareness of resources  Outcome: Adequate for Discharge  Goal: Understands least restrictive measures  Description: Interventions:  - Utilize least restrictive behavior  Outcome: Adequate for Discharge  Goal: Free from restraint events  Description: - Utilize least restrictive measures   - Provide behavioral interventions   - Redirect inappropriate behaviors   Outcome: Adequate for Discharge     Problem: Risk for Self Injury/Neglect  Goal: Treatment Goal: Remain safe during length of stay, learn and adopt new coping skills, and be free of self-injurious ideation, impulses and acts at the time of discharge  Outcome: Adequate for Discharge  Goal: Verbalize thoughts and feelings  Description: Interventions:  - Assess and re-assess patient's lethality and potential for self-injury  - Engage patient in 1:1 interactions, daily, for a minimum of 15 minutes  - Encourage patient to express feelings, fears, frustrations, hopes  - Establish rapport/trust with patient   Outcome: Adequate for Discharge  Goal: Refrain from harming self  Description: Interventions:  - Monitor patient closely, per order  - Develop a trusting relationship  - Supervise medication ingestion, monitor effects and side effects   Outcome: Adequate for Discharge  Goal: Attend and participate in unit activities, including therapeutic, recreational, and educational groups  Description: Interventions:  - Provide therapeutic and educational activities daily, encourage attendance and participation, and document same in the medical record  - Obtain collateral information, encourage visitation and family involvement in care   Outcome: Adequate for Discharge  Goal: Recognize maladaptive responses and adopt new coping mechanisms  Outcome: Adequate for Discharge  Goal: Complete daily ADLs, including personal hygiene independently, as able  Description: Interventions:  - Observe, teach, and assist patient with ADLS  - Monitor and promote a balance of rest/activity, with adequate nutrition and elimination  Outcome: Adequate for Discharge     Problem: Depression  Goal: Treatment Goal: Demonstrate behavioral control of depressive symptoms, verbalize feelings of improved mood/affect, and adopt new coping skills prior to discharge  Outcome: Adequate for Discharge  Goal: Verbalize thoughts and feelings  Description: Interventions:  - Assess and re-assess patient's level of risk   - Engage patient in 1:1 interactions, daily, for a minimum of 15 minutes   - Encourage patient to express feelings, fears, frustrations, hopes   Outcome: Adequate for Discharge  Goal: Refrain from harming self  Description: Interventions:  - Monitor patient closely, per order   - Supervise medication ingestion, monitor effects and side effects   Outcome: Adequate for Discharge  Goal: Refrain from isolation  Description: Interventions:  - Develop a trusting relationship   - Encourage socialization   Outcome: Adequate for Discharge  Goal: Refrain from self-neglect  Outcome: Adequate for Discharge  Goal: Attend and participate in unit activities, including therapeutic, recreational, and educational groups  Description: Interventions:  - Provide therapeutic and educational activities daily, encourage attendance and participation, and document same in the medical record   Outcome: Adequate for Discharge  Goal: Complete daily ADLs, including personal hygiene independently, as able  Description: Interventions:  - Observe, teach, and assist patient with ADLS  -  Monitor and promote a balance of rest/activity, with adequate nutrition and elimination   Outcome: Adequate for Discharge     Problem: Anxiety  Goal: Anxiety is at manageable level  Description: Interventions:  - Assess and monitor patient's anxiety level  - Monitor for signs and symptoms (heart palpitations, chest pain, shortness of breath, headaches, nausea, feeling jumpy, restlessness, irritable, apprehensive)  - Collaborate with interdisciplinary team and initiate plan and interventions as ordered    - [place] patient to unit/surroundings  - Explain treatment plan  - Encourage participation in care  - Encourage verbalization of concerns/fears  - Identify coping mechanisms  - Assist in developing anxiety-reducing skills  - Administer/offer alternative therapies  - Limit or eliminate stimulants  Outcome: Adequate for Discharge

## 2022-11-08 NOTE — PLAN OF CARE
Problem: DISCHARGE PLANNING  Goal: Discharge to home or other facility with appropriate resources  Description: INTERVENTIONS:  - Identify barriers to discharge w/patient and caregiver  - Arrange for needed discharge resources and transportation as appropriate  - Identify discharge learning needs (meds, wound care, etc )  - Arrange for interpretive services to assist at discharge as needed  - Refer to Case Management Department for coordinating discharge planning if the patient needs post-hospital services based on physician/advanced practitioner order or complex needs related to functional status, cognitive ability, or social support system  Outcome: Completed     Discharge planning discussed with pt  IMM letter signed  OP psych and therapy appts scheduled  Lyft transportation arranged -- 11:30am   Med scripts should be sent to preferred pharmacy ([facility] in [facility] [place])

## 2022-11-08 NOTE — CASE MANAGEMENT
11/08/22 0846   Team Meeting   Meeting Type Daily Rounds   Initial Conference Date 11/08/22   Team Members Present   Team Members Present Physician;Nurse;;; Occupational Therapist   Physician Team Member Dr Jackelyn Guerrero; Dr Matilda Jones Management Team Member [address] [place] [address] Work Team Member Destinee   OT Team Member Jose New   Patient/Family Present   Patient Present No   Patient's Family Present No     Discharge today, calm, visible, med compliant, slept, cooperative, attended group
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
## 2022-11-08 NOTE — NURSING NOTE
Patient is discharged from the unit on this day  Denied SI/HI, psychosis and/or A/V  No questions verbalized  Patient is in agreement with discharge  Patient provided with after care appointment, discharge instructions and medication regime reviewed  Affect bright  Stated, "I am more than ready to leave"  Reported depression 0/10 and anxiety  0/4  Accompanied to the lobby where pt was met by   [facility]   All belongings returned

## 2022-11-11 ENCOUNTER — TELEPHONE (OUTPATIENT)
Dept: FAMILY MEDICINE CLINIC | Facility: CLINIC | Age: 57
End: 2022-11-11

## 2022-11-11 DIAGNOSIS — I10 PRIMARY HYPERTENSION: ICD-10-CM

## 2022-11-11 RX ORDER — METOPROLOL TARTRATE 50 MG/1
50 TABLET, FILM COATED ORAL EVERY 12 HOURS SCHEDULED
Qty: 180 TABLET | Refills: 3 | Status: CANCELLED | OUTPATIENT
Start: 2022-11-11

## 2022-11-14 DIAGNOSIS — F43.10 PTSD (POST-TRAUMATIC STRESS DISORDER): ICD-10-CM

## 2022-11-14 DIAGNOSIS — I10 PRIMARY HYPERTENSION: ICD-10-CM

## 2022-11-14 RX ORDER — TRAZODONE HYDROCHLORIDE 100 MG/1
200 TABLET ORAL
Qty: 60 TABLET | Refills: 2 | Status: SHIPPED | OUTPATIENT
Start: 2022-11-14

## 2022-11-14 RX ORDER — METOPROLOL TARTRATE 50 MG/1
50 TABLET, FILM COATED ORAL EVERY 12 HOURS SCHEDULED
Qty: 60 TABLET | Refills: 5 | Status: SHIPPED | OUTPATIENT
Start: 2022-11-14

## 2022-11-19 ENCOUNTER — HOSPITAL ENCOUNTER (EMERGENCY)
Facility: HOSPITAL | Age: 57
Discharge: HOME | End: 2022-11-19
Attending: EMERGENCY MEDICINE

## 2022-11-19 ENCOUNTER — APPOINTMENT (EMERGENCY)
Dept: RADIOLOGY | Facility: HOSPITAL | Age: 57
End: 2022-11-19

## 2022-11-19 VITALS
TEMPERATURE: 99.4 F | DIASTOLIC BLOOD PRESSURE: 95 MMHG | OXYGEN SATURATION: 95 % | WEIGHT: 190 LBS | SYSTOLIC BLOOD PRESSURE: 160 MMHG | HEIGHT: 63 IN | HEART RATE: 106 BPM | RESPIRATION RATE: 17 BRPM | BODY MASS INDEX: 33.66 KG/M2

## 2022-11-19 DIAGNOSIS — M25.561 ACUTE PAIN OF RIGHT KNEE: ICD-10-CM

## 2022-11-19 DIAGNOSIS — S93.601A SPRAIN OF RIGHT FOOT, INITIAL ENCOUNTER: Primary | ICD-10-CM

## 2022-11-19 RX ORDER — IBUPROFEN 600 MG/1
600 TABLET ORAL ONCE
Status: COMPLETED | OUTPATIENT
Start: 2022-11-19 | End: 2022-11-19

## 2022-11-19 RX ORDER — IBUPROFEN 600 MG/1
600 TABLET ORAL EVERY 6 HOURS PRN
Qty: 20 TABLET | Refills: 0 | Status: SHIPPED | OUTPATIENT
Start: 2022-11-19

## 2022-11-19 RX ADMIN — IBUPROFEN 600 MG: 600 TABLET, FILM COATED ORAL at 19:40

## 2022-11-20 NOTE — DISCHARGE INSTRUCTIONS
Use Ace wrap, crutches for next few days for comfort, taking off to bathe or sleep or until follow-up  Use Tylenol 650 mg every 4 hours or Anti-inflammatories like Advil, Motrin, Ibuprofen, Aleve every 6 hours; you can alternate the 2 medications taking something every 3 hours for pain  Ice to area and elevate to help with symptoms  Follow-up with orthopedic doctor in the next few days if no improvement in condition

## 2022-11-20 NOTE — ED PROVIDER NOTES
History  Chief Complaint   Patient presents with   • Fall     Pt c/o of falling hurt ankle and knee today at 1500     Past Medical History: Adjustment disorder, Alcoholism-states quit 1 5 yrs ago, Anxiety/Depression/Bipolar disorder, Bowel obstruction, DM 2, Gastritis, Hyperlipidemia, HTN, Hypertension, Hypothyroidism, PTSD, Seizures, Substance abuse, Suicide attempt, Withdrawal symptoms, drug or narcotic   Past Surgical History: APPENDECTOMY, BOWEL RESECTION, CHOLECYSTECTOMY, HYSTERECTOMY, B/L Knee surgery    Presents to emergency department complaining of right foot and knee injury that patient sustained immediately prior to arrival, when she slipped on emilia while carrying a vacuum and fell with right leg behind her  Patient complaining of pain and swelling to foot since  Has not been able to ambulate/bear weight  Patient took Tylenol prior to arrival   Patient denies hitting head, no loss of consciousness, no other injuries or complaints  Pt denies drinking etoh  Prior to Admission Medications   Prescriptions Last Dose Informant Patient Reported? Taking? ARIPiprazole (ABILIFY) 10 mg tablet   No No   Sig: Take 1 tablet (10 mg total) by mouth daily   Alcohol Swabs 70 % PADS   No No   Sig: May substitute brand based on insurance coverage  Check glucose TID  Blood Glucose Monitoring Suppl (OneTouch Verio Reflect) w/Device KIT   No No   Sig: May substitute brand based on insurance coverage  Check glucose TID  Microlet Lancets MISC   Yes No   Patient not taking: Reported on 6/27/7624   OneTouch Delica Lancets 00F MISC   No No   Sig: May substitute brand based on insurance coverage  Check glucose TID     atorvastatin (LIPITOR) 20 mg tablet   No No   Sig: Take 1 tablet (20 mg total) by mouth daily   benztropine (COGENTIN) 1 mg tablet   Yes No   Sig: Take 1 mg by mouth daily   clonazePAM (KlonoPIN) 0 5 mg tablet   No No   Sig: TAKE 1 TABLET(0 5 MG) BY MOUTH TWICE DAILY AS NEEDED FOR ANXIETY ergocalciferol (VITAMIN D2) 50,000 units   No No   Sig: TAKE 1 CAPSULE BY MOUTH 1 TIME A WEEK   fluticasone-vilanterol (Breo Ellipta) 100-25 mcg/inh inhaler   No No   Sig: Inhale 1 puff daily Rinse mouth after use     folic acid (FOLVITE) 1 mg tablet   Yes No   Sig: Take by mouth daily   gabapentin (NEURONTIN) 800 mg tablet   No No   Sig: Take 1 tablet (800 mg total) by mouth 3 (three) times a day   hydrochlorothiazide (HYDRODIURIL) 12 5 mg tablet   No No   Sig: TAKE 1 TABLET(12 5 MG) BY MOUTH DAILY   insulin glargine (LANTUS) 100 units/mL subcutaneous injection   No No   Sig: Inject 25 Units under the skin daily at bedtime   levothyroxine 25 mcg tablet   No No   Sig: Take 1 tablet (25 mcg total) by mouth daily in the early morning   metFORMIN (GLUCOPHAGE) 1000 MG tablet   No No   Sig: TAKE 1 TABLET(1000 MG) BY MOUTH TWICE DAILY WITH MEALS   metoprolol tartrate (LOPRESSOR) 50 mg tablet   No No   Sig: Take 1 tablet (50 mg total) by mouth every 12 (twelve) hours   nicotine (NICODERM CQ) 21 mg/24 hr TD 24 hr patch   No No   Sig: Place 1 patch on the skin daily   pantoprazole (PROTONIX) 40 mg tablet   No No   Sig: Take 1 tablet (40 mg total) by mouth daily   traZODone (DESYREL) 100 mg tablet   No No   Sig: Take 2 tablets (200 mg total) by mouth daily at bedtime      Facility-Administered Medications: None       Past Medical History:   Diagnosis Date   • Addiction to drug [facility])    • Adjustment disorder    • Alcohol abuse    • Alcoholism ([place] [address] )    • Anxiety    • Bipolar disorder ([place] [address] )    • Bowel obstruction ([place] [address] )    • Depression    • Diabetes type 2, controlled ([place] [address] )    • Disease of thyroid gland    • Gastritis    • Head injury    • Heart palpitations    • Hyperlipidemia    • Hypertension    • Hypothyroidism    • Psychiatric illness    • PTSD (post-traumatic stress disorder)    • Seizure ([place] [address] )    • Seizures ([facility])    • Sleep difficulties    • Substance abuse ([place] [address] )    • Suicide attempt ([place] [address] )    • Withdrawal symptoms, drug or narcotic [facility])        Past Surgical History:   Procedure Laterality Date   • ABDOMINAL SURGERY     • APPENDECTOMY     • BOWEL RESECTION     • CHOLECYSTECTOMY     • ESOPHAGOGASTRODUODENOSCOPY N/A 5/18/2018    Procedure: ESOPHAGOGASTRODUODENOSCOPY (EGD) with bx;  Surgeon: Dianna Swift DO;  Location: [facility]; Service: Gastroenterology   • HYSTERECTOMY     • KNEE SURGERY Bilateral        Family History   Problem Relation Age of Onset   • Diabetes Father    • Bipolar disorder Mother      I have reviewed and agree with the history as documented  E-Cigarette/Vaping   • E-Cigarette Use Never User      E-Cigarette/Vaping Substances   • Nicotine No    • THC No    • CBD No    • Flavoring No    • Other No    • Unknown No      Social History     Tobacco Use   • Smoking status: Every Day     Packs/day: 1 00     Years: 25 00     Pack years: 25 00     Types: Cigarettes   • Smokeless tobacco: Never   • Tobacco comments:     smokes like 5 a day(06/24/2022)   Vaping Use   • Vaping Use: Never used   Substance Use Topics   • Alcohol use: Not Currently     Alcohol/week: 6 0 standard drinks     Types: 6 Cans of beer per week     Comment: last drink on 08/15/20   • Drug use: No       Review of Systems   Constitutional: Negative for fever  HENT: Negative for hearing loss and sore throat  Respiratory: Negative for cough and shortness of breath  Cardiovascular: Negative for chest pain  Gastrointestinal: Negative for abdominal pain, constipation, diarrhea and vomiting  Musculoskeletal: Positive for arthralgias, gait problem, joint swelling and myalgias  Skin: Negative for wound  Neurological: Negative for weakness and numbness  All other systems reviewed and are negative  Physical Exam  Physical Exam  Vitals and nursing note reviewed  Constitutional:       General: She is in acute distress (mild)  Appearance: She is well-developed  She is obese  HENT:      Head: Normocephalic and atraumatic  Right Ear: External ear normal       Left Ear: External ear normal       Nose: Nose normal       Mouth/Throat:      Mouth: Mucous membranes are moist       Pharynx: Oropharynx is clear  Eyes:      Conjunctiva/sclera: Conjunctivae normal    Cardiovascular:      Rate and Rhythm: Normal rate  Pulmonary:      Effort: Pulmonary effort is normal  No respiratory distress  Musculoskeletal:         General: Swelling and tenderness present  Normal range of motion  Cervical back: Normal range of motion  Comments: RLE:  No tenderness to knee,some pain with ROM negative anterior/ posterior draw sign appreciated  Achilles tendon intact   mild diffuse tenderness noted to proximal aspect of right foot , no pain to bilateral malleoli and toes, distal neurovascular intact   Skin:     General: Skin is warm and dry  Capillary Refill: Capillary refill takes less than 2 seconds  Findings: No bruising, erythema or rash  Neurological:      Mental Status: She is alert     Psychiatric:         Behavior: Behavior normal          Vital Signs  ED Triage Vitals [11/19/22 1935]   Temperature Pulse Respirations Blood Pressure SpO2   99 4 °F (37 4 °C) (!) 106 17 160/95 95 %      Temp Source Heart Rate Source Patient Position - Orthostatic VS BP Location FiO2 (%)   Oral Monitor Sitting Right arm --      Pain Score       10 - Worst Possible Pain           Vitals:    11/19/22 1935   BP: 160/95   Pulse: (!) 106   Patient Position - Orthostatic VS: Sitting         Visual Acuity      ED Medications  Medications   ibuprofen (MOTRIN) tablet 600 mg (600 mg Oral Given 11/19/22 1940)       Diagnostic Studies  Results Reviewed     None                 XR knee 4+ views Right injury   ED Interpretation by Rico Landaverde PA-C (11/19 2005)   No fx, no acute process      XR foot 3+ views RIGHT   ED Interpretation by Rico Landaverde PA-C (11/19 2005)   No fx                 Procedures  Procedures         ED Course MDM  Number of Diagnoses or Management Options  Diagnosis management comments: Ace wrap placed by me  Crutches given       Amount and/or Complexity of Data Reviewed  Tests in the radiology section of CPT®: ordered and reviewed        Disposition  Final diagnoses:   Sprain of right foot, initial encounter   Acute pain of right knee     Time reflects when diagnosis was documented in both MDM as applicable and the Disposition within this note     Time User Action Codes Description Comment    11/19/2022  8:20 PM Mandi Stark Add [D85 599N] Sprain of right foot, initial encounter     11/19/2022  8:25 PM Mandi Stark Add [F99 113] Acute pain of right knee       ED Disposition     ED Disposition   Discharge    Condition   Stable    Date/Time   Sat Nov 19, 2022  8:18 PM    Comment   Melisa Varner discharge to home/self care  Follow-up Information     Follow up With Specialties Details Why Contact Info Additional [facility]   [address] [address] [phone] [address] [address] [phone]          Patient's Medications   Discharge Prescriptions    IBUPROFEN (MOTRIN) 600 MG TABLET    Take 1 tablet (600 mg total) by mouth every 6 (six) hours as needed for mild pain       Start Date: 11/19/2022End Date: --       Order Dose: 600 mg       Quantity: 20 tablet    Refills: 0       No discharge procedures on file      PDMP Review       Value Time User    PDMP Reviewed  Yes 11/9/2022  3:08 PM Regan Degroot MD          ED Provider  Electronically Signed by           Chato Stephens PA-C  11/19/22 2027

## 2022-12-06 ENCOUNTER — TELEPHONE (OUTPATIENT)
Dept: PSYCHIATRY | Facility: CLINIC | Age: 57
End: 2022-12-06

## 2022-12-06 DIAGNOSIS — F31.4 BIPOLAR DISORDER, CURRENT EPISODE DEPRESSED, SEVERE, WITHOUT PSYCHOTIC FEATURES (HCC): ICD-10-CM

## 2022-12-06 NOTE — LETTER
22     Sharon Anders   : 1965   Pritesh Banks       It is the policy of [facility] to monitor and manage appointments that have been no-showed or cancelled with less than 48-hour notice  This is necessary to ensure that we are able to provide timely access for all patients to our providers  Undue numbers of unutilized appointments delays necessary medical care for all patients  Dear Sharon Anders   We are sorry that you missed your appointment with Raulito Duncan LCSW, AMERICA, FLAVIO, [place]ORIS on 2022  Hilary Sanchez Your health and follow-up care are important to us  We want to make you aware of your next appointment with Raulito Duncan LCSW, ACSW, BCD, [facility] that is scheduled for Wednesday ,  2022 at 8 am     Please be aware that our office policy states that if you 'no show' two Therapy appointments in a row without prior notice of cancellation, or three or more in a calendar year, you may be discharged from Therapy treatment  We want to bring this to your attention now to prevent an interruption of your care  If you have any questions about this policy, please call us at the number above  Thank you in advance for your cooperation and assistance         Sincerely,      [facility] Support Staff

## 2022-12-07 ENCOUNTER — TELEPHONE (OUTPATIENT)
Dept: FAMILY MEDICINE CLINIC | Facility: CLINIC | Age: 57
End: 2022-12-07

## 2022-12-07 DIAGNOSIS — F31.9 BIPOLAR 1 DISORDER, DEPRESSED (HCC): ICD-10-CM

## 2022-12-07 DIAGNOSIS — F31.4 BIPOLAR DISORDER, CURRENT EPISODE DEPRESSED, SEVERE, WITHOUT PSYCHOTIC FEATURES (HCC): ICD-10-CM

## 2022-12-07 RX ORDER — CLONAZEPAM 0.5 MG/1
TABLET ORAL
Qty: 60 TABLET | OUTPATIENT
Start: 2022-12-07

## 2022-12-07 RX ORDER — GABAPENTIN 800 MG/1
800 TABLET ORAL 3 TIMES DAILY
Qty: 90 TABLET | Refills: 4 | Status: SHIPPED | OUTPATIENT
Start: 2022-12-07

## 2022-12-07 RX ORDER — GABAPENTIN 800 MG/1
TABLET ORAL
Qty: 90 TABLET | Refills: 4 | OUTPATIENT
Start: 2022-12-07

## 2022-12-07 RX ORDER — CLONAZEPAM 0.5 MG/1
0.5 TABLET ORAL 2 TIMES DAILY PRN
Qty: 60 TABLET | Refills: 0 | Status: SHIPPED | OUTPATIENT
Start: 2022-12-07

## 2022-12-07 NOTE — TELEPHONE ENCOUNTER
Patient called she needs a refill on her gabapentin and her klonopin sent to [facility]    She has an appointment on 12/21/22

## 2022-12-19 ENCOUNTER — TELEPHONE (OUTPATIENT)
Dept: PSYCHIATRY | Facility: CLINIC | Age: 57
End: 2022-12-19

## 2022-12-19 NOTE — TELEPHONE ENCOUNTER
Pt No showed first appt with Keyana fisher 11/28/22 so follow-up was cx as well on 12/27/22, pt needs to be rescheduled in next Np slot, thank you

## 2022-12-21 ENCOUNTER — TELEPHONE (OUTPATIENT)
Dept: PSYCHIATRY | Facility: CLINIC | Age: 57
End: 2022-12-21

## 2022-12-21 NOTE — LETTER
22     Randy Lopez   : 1965   Pritesh [address]       It is the policy of [facility] to monitor and manage appointments that have been no-showed or cancelled with less than 48-hour notice  This is necessary to ensure that we are able to provide timely access for all patients to our providers  Undue numbers of unutilized appointments delays necessary medical care for all patients  Dear Randy Lopez       We are sorry that you missed your appointment with Thony Perez, MELISSAW, ACSW, BCD, CAADC on 2022  Your health and follow-up care are important to us  As this was a New Patient appointment, you will need to contact the Office at [phone] if you would like to reschedule  Please be aware that our office policy states that if you 'no show' two Therapy appointments in a row without prior notice of cancellation, or three or more in a calendar year, you may be discharged from Therapy treatment  We want to bring this to your attention now to prevent an interruption of your care  If you have any questions about this policy, please call us at the number above  Thank you in advance for your cooperation and assistance         Sincerely,      [address] [facility] [address] Support Staff

## 2022-12-27 ENCOUNTER — TELEPHONE (OUTPATIENT)
Dept: PSYCHIATRY | Facility: CLINIC | Age: 57
End: 2022-12-27

## 2023-01-03 ENCOUNTER — TELEPHONE (OUTPATIENT)
Dept: FAMILY MEDICINE CLINIC | Facility: CLINIC | Age: 58
End: 2023-01-03

## 2023-01-03 NOTE — TELEPHONE ENCOUNTER
Patient needs refill on klonopin  She missed her last appointment   Father is ill and she is very busy taking care of him    [facility] [employer]

## 2023-01-03 NOTE — TELEPHONE ENCOUNTER
called back again wanted to see if he could just get enough medication till her appointment tomorrow her father is ill and she took more then she should and is short this month she does have an appointment tomorrow?

## 2023-01-04 ENCOUNTER — OFFICE VISIT (OUTPATIENT)
Dept: FAMILY MEDICINE CLINIC | Facility: CLINIC | Age: 58
End: 2023-01-04

## 2023-01-04 VITALS
SYSTOLIC BLOOD PRESSURE: 130 MMHG | HEIGHT: 63 IN | TEMPERATURE: 97.1 F | BODY MASS INDEX: 35.44 KG/M2 | OXYGEN SATURATION: 97 % | WEIGHT: 200 LBS | DIASTOLIC BLOOD PRESSURE: 88 MMHG | HEART RATE: 83 BPM | RESPIRATION RATE: 16 BRPM

## 2023-01-04 DIAGNOSIS — F13.20 BENZODIAZEPINE DEPENDENCE, CONTINUOUS (HCC): ICD-10-CM

## 2023-01-04 DIAGNOSIS — F41.9 ANXIETY: ICD-10-CM

## 2023-01-04 DIAGNOSIS — G89.29 CHRONIC PAIN OF RIGHT KNEE: ICD-10-CM

## 2023-01-04 DIAGNOSIS — M25.561 CHRONIC PAIN OF RIGHT KNEE: ICD-10-CM

## 2023-01-04 DIAGNOSIS — Z00.00 MEDICARE ANNUAL WELLNESS VISIT, SUBSEQUENT: ICD-10-CM

## 2023-01-04 DIAGNOSIS — F41.1 GENERALIZED ANXIETY DISORDER: Chronic | ICD-10-CM

## 2023-01-04 DIAGNOSIS — E66.01 OBESITY, MORBID (HCC): ICD-10-CM

## 2023-01-04 DIAGNOSIS — F31.4 BIPOLAR DISORDER, CURRENT EPISODE DEPRESSED, SEVERE, WITHOUT PSYCHOTIC FEATURES (HCC): ICD-10-CM

## 2023-01-04 DIAGNOSIS — F31.30 BIPOLAR DISORDER, CURRENT EPISODE DEPRESSED, MILD OR MODERATE SEVERITY, UNSPECIFIED (HCC): ICD-10-CM

## 2023-01-04 DIAGNOSIS — E03.9 ACQUIRED HYPOTHYROIDISM: ICD-10-CM

## 2023-01-04 DIAGNOSIS — E03.9 HYPOTHYROIDISM: ICD-10-CM

## 2023-01-04 DIAGNOSIS — J43.9 PULMONARY EMPHYSEMA, UNSPECIFIED EMPHYSEMA TYPE (HCC): ICD-10-CM

## 2023-01-04 DIAGNOSIS — I10 PRIMARY HYPERTENSION: ICD-10-CM

## 2023-01-04 DIAGNOSIS — E66.9 DIABETES MELLITUS TYPE 2 IN OBESE (HCC): Primary | ICD-10-CM

## 2023-01-04 DIAGNOSIS — Z23 ENCOUNTER FOR IMMUNIZATION: ICD-10-CM

## 2023-01-04 DIAGNOSIS — E11.69 DIABETES MELLITUS TYPE 2 IN OBESE (HCC): Primary | ICD-10-CM

## 2023-01-04 RX ORDER — LEVOTHYROXINE SODIUM 0.03 MG/1
25 TABLET ORAL
Qty: 90 TABLET | Refills: 1 | Status: SHIPPED | OUTPATIENT
Start: 2023-01-04

## 2023-01-04 RX ORDER — ESCITALOPRAM OXALATE 10 MG/1
10 TABLET ORAL DAILY
Qty: 30 TABLET | Refills: 5 | Status: SHIPPED | OUTPATIENT
Start: 2023-01-04 | End: 2023-01-06

## 2023-01-04 RX ORDER — HYDROCHLOROTHIAZIDE 12.5 MG/1
12.5 TABLET ORAL DAILY
Qty: 90 TABLET | Refills: 2 | Status: SHIPPED | OUTPATIENT
Start: 2023-01-04

## 2023-01-04 RX ORDER — SEMAGLUTIDE 1.34 MG/ML
0.5 INJECTION, SOLUTION SUBCUTANEOUS WEEKLY
Qty: 1.5 ML | Refills: 2 | Status: SHIPPED | OUTPATIENT
Start: 2023-01-04

## 2023-01-04 NOTE — PROGRESS NOTES
Diabetic Foot Exam    Patient's shoes and socks removed  Right Foot/Ankle   Right Foot Inspection  Skin Exam: skin normal and skin intact  No dry skin, no warmth, no callus, no erythema, no maceration, no abnormal color, no pre-ulcer, no ulcer and no callus  Toe Exam: ROM and strength within normal limits  Sensory   Monofilament testing: intact    Vascular  Capillary refills: < 3 seconds  The right DP pulse is 1+  Left Foot/Ankle  Left Foot Inspection  Skin Exam: skin normal and skin intact  No dry skin, no warmth, no erythema, no maceration, normal color, no pre-ulcer, no ulcer and no callus  Toe Exam: ROM and strength within normal limits  Sensory   Monofilament testing: intact    Vascular  Capillary refills: < 3 seconds  The left DP pulse is 1+       Assign Risk Category  No deformity present  No loss of protective sensation  No weak pulses  Risk: 0

## 2023-01-04 NOTE — ASSESSMENT & PLAN NOTE
Lab Results   Component Value Date    HGBA1C 6 9 (H) 11/02/2022   start ozempic  Check labs  According to patient her morning blood sugar is always more than 200  No hypoglycemic reaction is watching diet  Recent A1c in November was around 6 8  Continue insulin, meds  Possible side effects and benefits of Ozempic were discussed with patient    She is eager to lose weight also

## 2023-01-04 NOTE — PROGRESS NOTES
Assessment and Plan:     Problem List Items Addressed This Visit        Endocrine    Acquired hypothyroidism     Check TSH         Relevant Medications    levothyroxine 25 mcg tablet    Diabetes mellitus type 2 in obese [facility]) - Primary       Lab Results   Component Value Date    HGBA1C 6 9 (H) 11/02/2022   start ozempic  Check labs  According to patient her morning blood sugar is always more than 200  No hypoglycemic reaction is watching diet  Recent A1c in November was around 6 8  Continue insulin, meds  Possible side effects and benefits of Ozempic were discussed with patient  She is eager to lose weight also         Relevant Medications    Semaglutide,0 25 or 0 5MG/DOS, (Ozempic, 0 25 or 0 5 MG/DOSE,) 2 MG/1 5ML SOPN    Other Relevant Orders    Comprehensive metabolic panel    CBC and Platelet    Microalbumin / creatinine urine ratio    Lipid Panel with Direct LDL reflex    TSH, 3rd generation with Free T4 reflex    Hypothyroidism    Relevant Medications    levothyroxine 25 mcg tablet       Respiratory    Pulmonary emphysema, unspecified emphysema type ([facility] )       Cardiovascular and Mediastinum    Primary hypertension     Blood pressure under control  Check labs  Relevant Medications    hydrochlorothiazide (HYDRODIURIL) 12 5 mg tablet       Other    Generalized anxiety disorder (Chronic)     Continue clonazepam   Add escitalopram   Possible benefits and side effects were discussed with patient    PDMP was reviewed         Relevant Medications    escitalopram (Lexapro) 10 mg tablet    Bipolar disorder current episode depressed ([facility] )     Refer patient to psychiatrist         Relevant Medications    escitalopram (Lexapro) 10 mg tablet    Benzodiazepine dependence, continuous ([facility] [address] )    Obesity, morbid ([facility] )    Medicare annual wellness visit, subsequent   Other Visit Diagnoses     Encounter for immunization        Relevant Orders    influenza vaccine, quadrivalent, recombinant, PF, 0 5 mL, for patients 18 yr+ ([facility])    Bipolar disorder, current episode depressed, mild or moderate severity, unspecified (HCC)        Relevant Medications    escitalopram (Lexapro) 10 mg tablet    Other Relevant Orders    Ambulatory Referral to Psychiatry    Anxiety        Relevant Medications    escitalopram (Lexapro) 10 mg tablet    Chronic pain of right knee        Relevant Orders    Ambulatory Referral to Orthopedic Surgery        BMI Counseling: Body mass index is 35 43 kg/m²  The BMI is above normal  Nutrition recommendations include decreasing portion sizes, decreasing fast food intake, consuming healthier snacks, limiting drinks that contain sugar, moderation in carbohydrate intake and reducing intake of cholesterol  Exercise recommendations include exercising 3-5 times per week and strength training exercises  Rationale for BMI follow-up plan is due to patient being overweight or obese  Depression Screening and Follow-up Plan: Patient advised to follow-up with PCP for further management  Preventive health issues were discussed with patient, and age appropriate screening tests were ordered as noted in patient's After Visit Summary  Personalized health advice and appropriate referrals for health education or preventive services given if needed, as noted in patient's After Visit Summary  History of Present Illness:     Patient presents for a Medicare Wellness Visit    1  Eo-5-lmkhpss blood sugar readings are more than 200 every day  No hypoglycemia  No increased numbness or tingling  No claudication pain  No abdominal pain  No chest pain or increased dyspnea  No increased fatigue  No polyuria or polydipsia  No change in the vision  2  Mood disorders  Lately she is feeling more anxious  No depressed mood  No suicidal or homicidal ideation  No manic episode recently       Patient Care Team:  Jack Cutler MD as PCP - General (Internal Medicine)  Jack Cutler MD as PCP - PCP-[facility] ([facility])     Review of Systems:     Review of Systems   Constitutional: Negative for appetite change, chills, diaphoresis, fatigue and fever  HENT: Negative for congestion, drooling and sinus pain  Eyes: Negative for discharge and itching  Respiratory: Negative for cough, chest tightness and shortness of breath  Cardiovascular: Negative for chest pain, palpitations and leg swelling  Gastrointestinal: Negative  Endocrine: Negative for polyphagia and polyuria  Genitourinary: Negative for difficulty urinating, dysuria, frequency and urgency  Musculoskeletal: Positive for arthralgias  Skin: Negative for pallor and rash  Allergic/Immunologic: Negative for food allergies  Neurological: Negative for dizziness, seizures, speech difficulty, light-headedness, numbness and headaches  Hematological: Negative for adenopathy  Does not bruise/bleed easily  Psychiatric/Behavioral: Negative for agitation, confusion, decreased concentration, dysphoric mood, self-injury and suicidal ideas  The patient is nervous/anxious           Problem List:     Patient Active Problem List   Diagnosis   • Essential hypertension   • Alcohol use disorder, moderate, dependence ([facility])   • Post-traumatic stress disorder, chronic   • Generalized anxiety disorder   • B[facility] disorder [facility] episode depressed [facility]   • Bipolar I disorder with anxious distress ([facility] )   • Benzodiazepine dependence, continuous ([facility])   • Non compliance w medication regimen   • Acquired hypothyroidism   • Hypokalemia   • Hyperlipemia   • Transaminitis   • Cognitive dysfunction in bipolar disorder ([facility] )   • Medical clearance for psychiatric admission   • Tobacco abuse   • Toxic encephalopathy   • Gastroesophageal reflux disease without esophagitis   • Increased anion gap metabolic acidosis   • STI (sexually transmitted infection)   • Chest pressure   • Diabetes mellitus type 2 in [facility])   • Primary hypertension   • Hypothyroidism   • Vitamin D deficiency   • Tiredness   • Obesity, morbid ([place] [address] )   • Tachypnea   • Hypertensive urgency   • Tachycardia   • Bronchitis with acute wheezing   • Cigarette smoker   • Chronic cough   • Medicare annual wellness visit, subsequent   • Pulmonary emphysema, unspecified emphysema type ([place] [address] )      Past Medical and Surgical History:     Past Medical History:   Diagnosis Date   • Addiction to drug [facility])    • Adjustment disorder    • Alcohol abuse    • Alcoholism ([place] [address] )    • Anxiety    • Bipolar disorder ([place] [address] )    • Bowel obstruction ([place] [address] )    • Depression    • Diabetes type 2, controlled ([place] [address] )    • Disease of thyroid gland    • Gastritis    • Head injury    • Heart palpitations    • Hyperlipidemia    • Hypertension    • Hypothyroidism    • Psychiatric illness    • PTSD (post-traumatic stress disorder)    • Seizure ([place] [address] )    • Seizures ([place] [address] )    • Sleep difficulties    • Substance abuse ([place] [address] )    • Suicide attempt ([place] [address] )    • Withdrawal symptoms, drug or narcotic ([place] [address] )      Past Surgical History:   Procedure Laterality Date   • ABDOMINAL SURGERY     • APPENDECTOMY     • BOWEL RESECTION     • CHOLECYSTECTOMY     • ESOPHAGOGASTRODUODENOSCOPY N/A 5/18/2018    Procedure: ESOPHAGOGASTRODUODENOSCOPY (EGD) with bx;  Surgeon: Charly Pires DO;  Location: [facility];   Service: Gastroenterology   • HYSTERECTOMY     • KNEE SURGERY Bilateral       Family History:     Family History   Problem Relation Age of Onset   • Diabetes Father    • Bipolar disorder Mother       Social History:     Social History     Socioeconomic History   • Marital status:      Spouse name: None   • Number of children: None   • Years of education: None   • Highest education level: None   Occupational History   • None   Tobacco Use   • Smoking status: Every Day     Packs/day: 1 00     Years: 25 00     Pack years: 25 00     Types: Cigarettes   • Smokeless tobacco: Never   • Tobacco comments:     smokes like 5 a day(06/24/2022)   Vaping Use   • Vaping Use: Never used   Substance and Sexual Activity   • Alcohol use: Not Currently     Alcohol/week: 6 0 standard drinks     Types: 6 Cans of beer per week     Comment: last drink on 08/15/20   • Drug use: No   • Sexual activity: Not Currently   Other Topics Concern   • None   Social History Narrative   • None     Social Determinants of Health     Financial Resource Strain: Not on file   Food Insecurity: Not on file   Transportation Needs: Not on file   Physical Activity: Not on file   Stress: Not on file   Social Connections: Not on file   Intimate Partner Violence: Not on file   Housing Stability: Not on file      Medications and Allergies:     Current Outpatient Medications   Medication Sig Dispense Refill   • Alcohol Swabs 70 % PADS May substitute brand based on insurance coverage  Check glucose TID  100 each 0   • atorvastatin (LIPITOR) 20 mg tablet Take 1 tablet (20 mg total) by mouth daily 90 tablet 3   • Blood Glucose Monitoring Suppl (OneTouch Verio Reflect) w/Device KIT May substitute brand based on insurance coverage   Check glucose TID  1 kit 0   • clonazePAM (KlonoPIN) 0 5 mg tablet TAKE 1 TABLET(0 5 MG) BY MOUTH TWICE DAILY AS NEEDED FOR SEIZURES 60 tablet 0   • ergocalciferol (VITAMIN D2) 50,000 units TAKE 1 CAPSULE BY MOUTH 1 TIME A WEEK 12 capsule 1   • escitalopram (Lexapro) 10 mg tablet Take 1 tablet (10 mg total) by mouth daily 30 tablet 5   • gabapentin (NEURONTIN) 800 mg tablet Take 1 tablet (800 mg total) by mouth 3 (three) times a day 90 tablet 4   • hydrochlorothiazide (HYDRODIURIL) 12 5 mg tablet Take 1 tablet (12 5 mg total) by mouth daily 90 tablet 2   • insulin glargine (LANTUS) 100 units/mL subcutaneous injection Inject 25 Units under the skin daily at bedtime 10 mL 0   • levothyroxine 25 mcg tablet Take 1 tablet (25 mcg total) by mouth daily in the early morning 90 tablet 1   • metFORMIN (GLUCOPHAGE) 1000 MG tablet TAKE 1 TABLET(1000 MG) BY MOUTH TWICE DAILY WITH MEALS 180 tablet 1   • metoprolol tartrate (LOPRESSOR) 50 mg tablet Take 1 tablet (50 mg total) by mouth every 12 (twelve) hours 60 tablet 5   • nicotine (NICODERM CQ) 21 mg/24 hr TD 24 hr patch Place 1 patch on the skin daily 28 patch 0   • OneTouch Delica Lancets 04Z MISC May substitute brand based on insurance coverage  Check glucose TID  100 each 0   • Semaglutide,0 25 or 0 5MG/DOS, (Ozempic, 0 25 or 0 5 MG/DOSE,) 2 MG/1 5ML SOPN Inject 0 5 mg under the skin once a week 1 5 mL 2   • traZODone (DESYREL) 100 mg tablet Take 2 tablets (200 mg total) by mouth daily at bedtime 60 tablet 2   • ARIPiprazole (ABILIFY) 10 mg tablet Take 1 tablet (10 mg total) by mouth daily (Patient not taking: Reported on 1/4/2023) 30 tablet 0   • benztropine (COGENTIN) 1 mg tablet Take 1 mg by mouth daily (Patient not taking: Reported on 1/4/2023)     • fluticasone-vilanterol (Breo Ellipta) 100-25 mcg/inh inhaler Inhale 1 puff daily Rinse mouth after use  (Patient not taking: Reported on 1/4/2023) 60 blister 0   • folic acid (FOLVITE) 1 mg tablet Take by mouth daily (Patient not taking: Reported on 1/4/2023)     • Microlet Lancets MISC  (Patient not taking: Reported on 6/19/2022)     • pantoprazole (PROTONIX) 40 mg tablet Take 1 tablet (40 mg total) by mouth daily (Patient not taking: Reported on 1/4/2023) 90 tablet 1     No current facility-administered medications for this visit       Allergies   Allergen Reactions   • Losartan Cough   • Latex Rash      Immunizations:     Immunization History   Administered Date(s) Administered   • COVID-19 MODERNA VACC 0 5 ML IM 04/29/2021   • INFLUENZA 10/20/2015, 12/14/2017, 10/30/2019   • Influenza, recombinant, quadrivalent,injectable, preservative free 03/24/2021   • Pneumococcal Polysaccharide PPV23 12/14/2017   • Tdap 03/22/2011      Health Maintenance:         Topic Date Due   • Colorectal Cancer Screening  04/04/2023 (Originally 10/3/2012)   • Breast Cancer Screening: Mammogram  01/04/2024 (Originally 10/3/2007) • Lung Cancer Screening  01/21/2027 (Originally 3/28/2023)   • Cervical Cancer Screening  02/04/2028 (Originally [birth year]/3/1988)   • HIV Screening  Completed   • Hepatitis C Screening  Completed         Topic Date Due   • Influenza Vaccine (1) 09/01/2022      Medicare Screening Tests and Risk Assessments:     Savanna Weaver is here for her Subsequent Wellness visit  Last Medicare Wellness visit information reviewed, patient interviewed, no change since last AWV  Health Risk Assessment:   Patient rates overall health as fair  Patient feels that their physical health rating is same  Patient is satisfied with their life  Eyesight was rated as same  Hearing was rated as same  Patient feels that their emotional and mental health rating is slightly worse  Patients states they are sometimes angry  Patient states they are sometimes unusually tired/fatigued  Pain experienced in the last 7 days has been some  Patient's pain rating has been 3/10  Patient states that she has experienced no weight loss or gain in last 6 months  Fall Risk Screening: In the past year, patient has experienced: no history of falling in past year      Urinary Incontinence Screening:   Patient has not leaked urine accidently in the last six months  Home Safety:  Patient has trouble with stairs inside or outside of their home  Patient has working smoke alarms and has working carbon monoxide detector  Home safety hazards include: none  Nutrition:   Current diet is Diabetic  Medications:   Patient is not currently taking any over-the-counter supplements  Patient is able to manage medications  Activities of Daily Living (ADLs)/Instrumental Activities of Daily Living (IADLs):   Walk and transfer into and out of bed and chair?: Yes  Dress and groom yourself?: Yes    Bathe or shower yourself?: Yes    Feed yourself?  Yes  Do your laundry/housekeeping?: Yes  Manage your money, pay your bills and track your expenses?: Yes  Make your own meals?: Yes    Do your own shopping?: Yes    Previous Hospitalizations:   Any hospitalizations or ED visits within the last 12 months?: No      Advance Care Planning:   Living will: No      Cognitive Screening:   Provider or family/friend/caregiver concerned regarding cognition?: No    PREVENTIVE SCREENINGS      Cardiovascular Screening:    General: History Lipid Disorder and Screening Current      Diabetes Screening:     General: Screening Not Indicated and History Diabetes      Colorectal Cancer Screening:     General: Screening Current      Breast Cancer Screening:     General: Patient Declines      Cervical Cancer Screening:    General: Patient Declines      Osteoporosis Screening:    General: Patient Declines      Abdominal Aortic Aneurysm (AAA) Screening:        General: Patient Declines      Lung Cancer Screening:     General: Screening Current      Hepatitis C Screening:    General: Screening Current    Other Counseling Topics:   Car/seat belt/driving safety, skin self-exam, sunscreen and regular weightbearing exercise and calcium and vitamin D intake  No results found  Physical Exam:     /88 (BP Location: Right arm, Patient Position: Sitting, Cuff Size: Adult)   Pulse 83   Temp (!) 97 1 °F (36 2 °C) (Temporal)   Resp 16   Ht 5' 3" (1 6 m)   Wt 90 7 kg (200 lb)   SpO2 97%   BMI 35 43 kg/m²     Physical Exam  Vitals and nursing note reviewed  Constitutional:       General: She is not in acute distress  Appearance: She is well-developed  HENT:      Head: Normocephalic and atraumatic  Eyes:      Conjunctiva/sclera: Conjunctivae normal    Cardiovascular:      Rate and Rhythm: Normal rate and regular rhythm  Pulses: Normal pulses  Heart sounds: Normal heart sounds  No murmur heard  Pulmonary:      Effort: Pulmonary effort is normal  No respiratory distress  Breath sounds: Normal breath sounds     Abdominal:      General: Bowel sounds are normal  There is no distension  Palpations: Abdomen is soft  There is no mass  Tenderness: There is no abdominal tenderness  Musculoskeletal:         General: No swelling  Cervical back: Neck supple  Right lower leg: No edema  Left lower leg: No edema  Skin:     General: Skin is warm and dry  Capillary Refill: Capillary refill takes less than 2 seconds  Neurological:      Mental Status: She is alert and oriented to person, place, and time     Psychiatric:         Mood and Affect: Mood normal          Behavior: Behavior normal           Kenya Carmona MD

## 2023-01-04 NOTE — ASSESSMENT & PLAN NOTE
Continue clonazepam   Add escitalopram   Possible benefits and side effects were discussed with patient    PDMP was reviewed

## 2023-01-06 ENCOUNTER — VBI (OUTPATIENT)
Dept: ADMINISTRATIVE | Facility: OTHER | Age: 58
End: 2023-01-06

## 2023-01-06 RX ORDER — ESCITALOPRAM OXALATE 10 MG/1
TABLET ORAL
Qty: 90 TABLET | Refills: 1 | Status: SHIPPED | OUTPATIENT
Start: 2023-01-06

## 2023-01-09 ENCOUNTER — TELEPHONE (OUTPATIENT)
Dept: PSYCHIATRY | Facility: CLINIC | Age: 58
End: 2023-01-09

## 2023-01-09 ENCOUNTER — OFFICE VISIT (OUTPATIENT)
Dept: OBGYN CLINIC | Facility: CLINIC | Age: 58
End: 2023-01-09

## 2023-01-09 ENCOUNTER — APPOINTMENT (OUTPATIENT)
Dept: RADIOLOGY | Facility: AMBULARY SURGERY CENTER | Age: 58
End: 2023-01-09
Attending: STUDENT IN AN ORGANIZED HEALTH CARE EDUCATION/TRAINING PROGRAM

## 2023-01-09 VITALS
WEIGHT: 200 LBS | BODY MASS INDEX: 35.44 KG/M2 | HEART RATE: 80 BPM | HEIGHT: 63 IN | DIASTOLIC BLOOD PRESSURE: 85 MMHG | SYSTOLIC BLOOD PRESSURE: 170 MMHG

## 2023-01-09 DIAGNOSIS — G89.29 CHRONIC PAIN OF RIGHT KNEE: ICD-10-CM

## 2023-01-09 DIAGNOSIS — G89.29 CHRONIC PAIN OF LEFT KNEE: Primary | ICD-10-CM

## 2023-01-09 DIAGNOSIS — M25.562 CHRONIC PAIN OF LEFT KNEE: ICD-10-CM

## 2023-01-09 DIAGNOSIS — M25.561 CHRONIC PAIN OF RIGHT KNEE: ICD-10-CM

## 2023-01-09 DIAGNOSIS — M25.562 CHRONIC PAIN OF LEFT KNEE: Primary | ICD-10-CM

## 2023-01-09 DIAGNOSIS — G89.29 CHRONIC PAIN OF LEFT KNEE: ICD-10-CM

## 2023-01-09 RX ORDER — METHYLPREDNISOLONE ACETATE 40 MG/ML
2 INJECTION, SUSPENSION INTRA-ARTICULAR; INTRALESIONAL; INTRAMUSCULAR; SOFT TISSUE
Status: COMPLETED | OUTPATIENT
Start: 2023-01-09 | End: 2023-01-09

## 2023-01-09 RX ORDER — ROPIVACAINE HYDROCHLORIDE 5 MG/ML
10 INJECTION, SOLUTION EPIDURAL; INFILTRATION; PERINEURAL
Status: COMPLETED | OUTPATIENT
Start: 2023-01-09 | End: 2023-01-09

## 2023-01-09 RX ORDER — MELOXICAM 15 MG/1
15 TABLET ORAL DAILY
Qty: 30 TABLET | Refills: 3 | Status: SHIPPED | OUTPATIENT
Start: 2023-01-09

## 2023-01-09 RX ORDER — METHYLPREDNISOLONE ACETATE 40 MG/ML
1 INJECTION, SUSPENSION INTRA-ARTICULAR; INTRALESIONAL; INTRAMUSCULAR; SOFT TISSUE
Status: COMPLETED | OUTPATIENT
Start: 2023-01-09 | End: 2023-01-09

## 2023-01-09 RX ADMIN — ROPIVACAINE HYDROCHLORIDE 10 ML: 5 INJECTION, SOLUTION EPIDURAL; INFILTRATION; PERINEURAL at 15:51

## 2023-01-09 RX ADMIN — METHYLPREDNISOLONE ACETATE 1 ML: 40 INJECTION, SUSPENSION INTRA-ARTICULAR; INTRALESIONAL; INTRAMUSCULAR; SOFT TISSUE at 15:51

## 2023-01-09 RX ADMIN — METHYLPREDNISOLONE ACETATE 2 ML: 40 INJECTION, SUSPENSION INTRA-ARTICULAR; INTRALESIONAL; INTRAMUSCULAR; SOFT TISSUE at 15:51

## 2023-01-09 NOTE — TELEPHONE ENCOUNTER
Called patient regarding referral to be placed on proper wait list  Unable to LVM for patient to call intake dept ([phone]) back

## 2023-01-09 NOTE — PROGRESS NOTES
Orthopaedics Office Visit -new patient Visit    ASSESSMENT/PLAN:    Assessment:   #1 left knee osteoarthritis, mild to moderate status post left high tibial osteotomy as a child    #2 right knee osteoarthritis moderate to severe status post what sounds like a microfracture surgery in her 25s  Plan:   #1 patient can be weightbearing as tolerated bilateral lower extremities  Activity as tolerated  2  The natural history of osteoarthritis was discussed with the patient at length  Nonoperative versus operative management options were discussed with the patient including total knee arthroplasty  At this time patient is 56years old and has not tried corticosteroid injections or viscosupplementation and has remaining joint space on her x-rays  Patient elected to proceed with corticosteroid injection today in the bilateral knees to attempt for pain relief  Discussed with the patient that if these do not last as long as she would like or if they are not successful in treating her osteoarthritis we can attempt viscosupplementation and that she should call back to the office if that is something she wants to pursue in the future  3  Patient is prescribed a prescription for meloxicam 15 mg p o  once daily  Risk of medications discussed  Discontinue if GI upset occurs  #4 will follow-up in 3 months or sooner if symptoms progress    To Do Next Visit:  Reevaluate bilateral knees  Viscosupplementation    _____________________________________________________  CHIEF COMPLAINT:  Chief Complaint   Patient presents with   • Right Knee - Pain   • Left Knee - Pain         SUBJECTIVE:  Wei Crowder is a 56 y o  female who presents with a complicated history of bilateral knee pain  She states that when she was a little girl she had severe knock knees and deformity of her knees which required surgical correction that she did not get until her 25s    She states that she went to a doctor in [place] that performed what sounds like a microfracture surgery on the right lower extremity, and a what sounds like a high tibial osteotomy on the left lower extremity  Medical records are not obtainable at this time  Patient is unsure of the exact name of the procedures  Patient is continue to have chronic pain in bilateral knees  Denies any fevers, chills  Denies any secondary traumas  She states that she has been taking Tylenol and using Voltaren gel as needed for pain relief  PAST MEDICAL HISTORY:  Past Medical History:   Diagnosis Date   • Addiction to drug [facility])    • Adjustment disorder    • Alcohol abuse    • Alcoholism ([facility] )    • Anxiety    • Bipolar disorder ([facility] )    • Bowel obstruction ([place] [address] )    • Depression    • Diabetes type 2, controlled ([place] [address] )    • Disease of thyroid gland    • Gastritis    • Head injury    • Heart palpitations    • Hyperlipidemia    • Hypertension    • Hypothyroidism    • Psychiatric illness    • PTSD (post-traumatic stress disorder)    • Seizure ([facility] )    • Seizures ([facility] )    • Sleep difficulties    • Substance abuse ([place] [address] )    • Suicide attempt ([place] [address] )    • Withdrawal symptoms, drug or narcotic ([place] [address] )        PAST SURGICAL HISTORY:  Past Surgical History:   Procedure Laterality Date   • ABDOMINAL SURGERY     • APPENDECTOMY     • BOWEL RESECTION     • CHOLECYSTECTOMY     • ESOPHAGOGASTRODUODENOSCOPY N/A 05/18/2018    Procedure: ESOPHAGOGASTRODUODENOSCOPY (EGD) with bx;  Surgeon: Tejinder Saxena DO;  Location: [facility];   Service: Gastroenterology   • HYSTERECTOMY     • KNEE SURGERY Bilateral 1991       FAMILY HISTORY:  Family History   Problem Relation Age of Onset   • Diabetes Father    • Bipolar disorder Mother        SOCIAL HISTORY:  Social History     Tobacco Use   • Smoking status: Every Day     Packs/day: 1 00     Years: 25 00     Pack years: 25 00     Types: Cigarettes   • Smokeless tobacco: Never   • Tobacco comments:     smokes like 5 a day(06/24/2022)   Vaping Use   • Vaping Use: Never used Substance Use Topics   • Alcohol use: Not Currently     Alcohol/week: 6 0 standard drinks     Types: 6 Cans of beer per week     Comment: last drink on 08/15/20   • Drug use: No       MEDICATIONS:    Current Outpatient Medications:   •  Alcohol Swabs 70 % PADS, May substitute brand based on insurance coverage  Check glucose TID , Disp: 100 each, Rfl: 0  •  atorvastatin (LIPITOR) 20 mg tablet, Take 1 tablet (20 mg total) by mouth daily, Disp: 90 tablet, Rfl: 3  •  Blood Glucose Monitoring Suppl (OneTouch Verio Reflect) w/Device KIT, May substitute brand based on insurance coverage   Check glucose TID , Disp: 1 kit, Rfl: 0  •  clonazePAM (KlonoPIN) 0 5 mg tablet, TAKE 1 TABLET(0 5 MG) BY MOUTH TWICE DAILY AS NEEDED FOR SEIZURES, Disp: 60 tablet, Rfl: 0  •  ergocalciferol (VITAMIN D2) 50,000 units, TAKE 1 CAPSULE BY MOUTH 1 TIME A WEEK, Disp: 12 capsule, Rfl: 1  •  escitalopram (LEXAPRO) 10 mg tablet, TAKE 1 TABLET(10 MG) BY MOUTH DAILY, Disp: 90 tablet, Rfl: 1  •  gabapentin (NEURONTIN) 800 mg tablet, Take 1 tablet (800 mg total) by mouth 3 (three) times a day, Disp: 90 tablet, Rfl: 4  •  hydrochlorothiazide (HYDRODIURIL) 12 5 mg tablet, Take 1 tablet (12 5 mg total) by mouth daily, Disp: 90 tablet, Rfl: 2  •  insulin glargine (LANTUS) 100 units/mL subcutaneous injection, Inject 25 Units under the skin daily at bedtime, Disp: 10 mL, Rfl: 0  •  levothyroxine 25 mcg tablet, Take 1 tablet (25 mcg total) by mouth daily in the early morning, Disp: 90 tablet, Rfl: 1  •  meloxicam (Mobic) 15 mg tablet, Take 1 tablet (15 mg total) by mouth daily Medication with food discontinue if GI discomfort occurs, Disp: 30 tablet, Rfl: 3  •  metFORMIN (GLUCOPHAGE) 1000 MG tablet, TAKE 1 TABLET(1000 MG) BY MOUTH TWICE DAILY WITH MEALS, Disp: 180 tablet, Rfl: 1  •  metoprolol tartrate (LOPRESSOR) 50 mg tablet, Take 1 tablet (50 mg total) by mouth every 12 (twelve) hours, Disp: 60 tablet, Rfl: 5  •  Microlet Lancets MISC, , Disp: , Rfl: •  nicotine (NICODERM CQ) 21 mg/24 hr TD 24 hr patch, Place 1 patch on the skin daily, Disp: 28 patch, Rfl: 0  •  OneTouch Delica Lancets 71A MISC, May substitute brand based on insurance coverage  Check glucose TID , Disp: 100 each, Rfl: 0  •  Semaglutide,0 25 or 0 5MG/DOS, (Ozempic, 0 25 or 0 5 MG/DOSE,) 2 MG/1 5ML SOPN, Inject 0 5 mg under the skin once a week, Disp: 1 5 mL, Rfl: 2  •  traZODone (DESYREL) 100 mg tablet, Take 2 tablets (200 mg total) by mouth daily at bedtime, Disp: 60 tablet, Rfl: 2  •  ARIPiprazole (ABILIFY) 10 mg tablet, Take 1 tablet (10 mg total) by mouth daily (Patient not taking: Reported on 1/4/2023), Disp: 30 tablet, Rfl: 0  •  benztropine (COGENTIN) 1 mg tablet, Take 1 mg by mouth daily (Patient not taking: Reported on 1/4/2023), Disp: , Rfl:   •  fluticasone-vilanterol (Breo Ellipta) 100-25 mcg/inh inhaler, Inhale 1 puff daily Rinse mouth after use  (Patient not taking: Reported on 1/4/2023), Disp: 60 blister, Rfl: 0  •  folic acid (FOLVITE) 1 mg tablet, Take by mouth daily (Patient not taking: Reported on 1/4/2023), Disp: , Rfl:   •  pantoprazole (PROTONIX) 40 mg tablet, Take 1 tablet (40 mg total) by mouth daily (Patient not taking: Reported on 1/4/2023), Disp: 90 tablet, Rfl: 1    ALLERGIES:  Allergies   Allergen Reactions   • Losartan Cough   • Latex Rash       REVIEW OF SYSTEMS:  MSK: Bilateral knee pain  Neuro: No numbness or paresthesias  Pertinent items are otherwise noted in HPI  A comprehensive review of systems was otherwise negative      LABS:  HgA1c:   Lab Results   Component Value Date    HGBA1C 6 9 (H) 11/02/2022     BMP:   Lab Results   Component Value Date    GLUCOSE 97 01/03/2016    CALCIUM 9 6 10/31/2022     01/03/2016    K 3 9 10/31/2022    CO2 26 10/31/2022    CL 99 10/31/2022    BUN 7 10/31/2022    CREATININE 0 74 10/31/2022     CBC: No components found for: CBC    _____________________________________________________  PHYSICAL EXAMINATION:  Vital signs: BP 170/85   Pulse 80   Ht 5' 3" (1 6 m)   Wt 90 7 kg (200 lb)   BMI 35 43 kg/m²   General: No acute distress, awake and alert  Psychiatric: Mood and affect appear appropriate  HEENT: Trachea Midline, No torticollis, no apparent facial trauma  Cardiovascular: No audible murmurs; Extremities appear perfused  Pulmonary: No audible wheezing or stridor  Skin: No open lesions; see further details (if any) below    MUSCULOSKELETAL EXAMINATION:  Extremities: Patient's right lower extremity was exposed inspected  Patient has a previous midline incision overlying the patella which is well-healed and sealed without any signs of periincisional erythema or induration  Patient's range in the motion in the knee is from 0 to 120 degrees  Patient is resting and valgus deformity  Valgus deformity is correctable to approximately 5 degrees shy of neutral   Patient has no instability varus valgus stress  Negative anterior and posterior drawer testing  She has medial and lateral joint line tenderness  More prominent on the medial side  Sensations intact to light touch in superficial peroneal, deep peroneal, sural, saphenous, plantar nerve distributions  Tibialis anterior, extensor Louis, gastrocnemius muscles intact  Brisk capillary refill in all 5 digits patient's left lower extremity was exposed inspected  Patient has a previous medial and lateral incisions around the proximal tibia  These are all well sealed and demonstrate no periincisional erythema or induration  Patient's range of motion of the knee is from 0 to 120 degrees without catching or locking  Patient's knee is stable to varus and valgus stress  Patient is resting in a valgus position and is approximately 5 degrees shy of correction with varus stress  Patient's sensation is intact to light touch in superficial peroneal, deep peroneal, sural, saphenous, plantar nerve distributions    Tibialis anterior, extensor hallucis longus, gastrocnemius muscles intact  Knee is stable to varus valgus stress  Negative anterior and posterior drawer testing  Positive medial joint line tenderness      _____________________________________________________  STUDIES REVIEWED:  I personally reviewed the images and interpretation is as follows:  X-rays of the right knee demonstrate moderate to severe tricompartmental osteoarthritis  There is resting valgus deformity of the knee  No acute fractures dislocations  X-ray of the left knee demonstrates mild to moderate tricompartmental osteoarthritis with good incorporation of the graft in the proximal tibia  The joint angle appears appropriate on weightbearing x-rays obtained today  No acute fractures dislocations    PROCEDURES PERFORMED:  Large joint arthrocentesis: bilateral knee  Universal Protocol:  Consent: Verbal consent obtained  Risks and benefits: risks, benefits and alternatives were discussed  Consent given by: patient  Patient understanding: patient states understanding of the procedure being performed  Patient consent: the patient's understanding of the procedure matches consent given  Site marked: the operative site was marked  Radiology Images displayed and confirmed   If images not available, report reviewed: imaging studies available  Patient identity confirmed: verbally with patient    Supporting Documentation  Indications: pain   Procedure Details  Location: knee - bilateral knee  Preparation: Patient was prepped and draped in the usual sterile fashion  Needle size: 22 G  Ultrasound guidance: no  Approach: anterolateral    Medications (Right): 2 mL methylPREDNISolone acetate 40 mg/mL; 10 mL ropivacaine 0 5 %Medications (Left): 1 mL methylPREDNISolone acetate 40 mg/mL; 10 mL ropivacaine 0 5 %   Patient tolerance: patient tolerated the procedure well with no immediate complications  Dressing:  Sterile dressing applied          Juan F Pink DO

## 2023-01-12 NOTE — TELEPHONE ENCOUNTER
Was calling pt in regards to routine referral and being placed on proper wait list  Phone kept ringing unable to LVM or make contact with pt

## 2023-01-23 ENCOUNTER — TELEPHONE (OUTPATIENT)
Dept: CARDIOLOGY CLINIC | Facility: CLINIC | Age: 58
End: 2023-01-23

## 2023-01-23 NOTE — TELEPHONE ENCOUNTER
Called and lvm advising patient to return call to Intake Dept at [phone] to be placed on the appropriate wait list

## 2023-01-25 ENCOUNTER — APPOINTMENT (EMERGENCY)
Dept: RADIOLOGY | Facility: HOSPITAL | Age: 58
End: 2023-01-25

## 2023-01-25 ENCOUNTER — HOSPITAL ENCOUNTER (INPATIENT)
Facility: HOSPITAL | Age: 58
LOS: 1 days | Discharge: HOME | End: 2023-01-27
Attending: INTERNAL MEDICINE | Admitting: STUDENT IN AN ORGANIZED HEALTH CARE EDUCATION/TRAINING PROGRAM

## 2023-01-25 ENCOUNTER — APPOINTMENT (EMERGENCY)
Dept: CT IMAGING | Facility: HOSPITAL | Age: 58
End: 2023-01-25

## 2023-01-25 ENCOUNTER — TELEPHONE (OUTPATIENT)
Dept: FAMILY MEDICINE CLINIC | Facility: CLINIC | Age: 58
End: 2023-01-25

## 2023-01-25 DIAGNOSIS — R10.9 ABDOMINAL PAIN, UNSPECIFIED ABDOMINAL LOCATION: Primary | ICD-10-CM

## 2023-01-25 DIAGNOSIS — R00.0 SINUS TACHYCARDIA: ICD-10-CM

## 2023-01-25 DIAGNOSIS — E87.20 LACTIC ACID INCREASED: ICD-10-CM

## 2023-01-25 PROBLEM — A41.9 SEPSIS (HCC): Status: ACTIVE | Noted: 2023-01-25

## 2023-01-25 LAB
ALBUMIN SERPL BCP-MCNC: 4.4 G/DL (ref 3.5–5)
ALP SERPL-CCNC: 116 U/L (ref 34–104)
ALT SERPL W P-5'-P-CCNC: 51 U/L (ref 7–52)
ANION GAP SERPL CALCULATED.3IONS-SCNC: 12 MMOL/L (ref 4–13)
AST SERPL W P-5'-P-CCNC: 26 U/L (ref 13–39)
ATRIAL RATE: 127 BPM
BASOPHILS # BLD AUTO: 0.03 THOUSANDS/ÂΜL (ref 0–0.1)
BASOPHILS NFR BLD AUTO: 0 % (ref 0–1)
BILIRUB SERPL-MCNC: 0.36 MG/DL (ref 0.2–1)
BUN SERPL-MCNC: 8 MG/DL (ref 5–25)
CALCIUM SERPL-MCNC: 10 MG/DL (ref 8.4–10.2)
CHLORIDE SERPL-SCNC: 99 MMOL/L (ref 96–108)
CO2 SERPL-SCNC: 26 MMOL/L (ref 21–32)
CREAT SERPL-MCNC: 0.71 MG/DL (ref 0.6–1.3)
EOSINOPHIL # BLD AUTO: 0.13 THOUSAND/ÂΜL (ref 0–0.61)
EOSINOPHIL NFR BLD AUTO: 1 % (ref 0–6)
ERYTHROCYTE [DISTWIDTH] IN BLOOD BY AUTOMATED COUNT: 13.4 % (ref 11.6–15.1)
GFR SERPL CREATININE-BSD FRML MDRD: 96 ML/MIN/1.73SQ M
GLUCOSE SERPL-MCNC: 102 MG/DL (ref 65–140)
GLUCOSE SERPL-MCNC: 170 MG/DL (ref 65–140)
HCT VFR BLD AUTO: 41.7 % (ref 34.8–46.1)
HGB BLD-MCNC: 14 G/DL (ref 11.5–15.4)
IMM GRANULOCYTES # BLD AUTO: 0.05 THOUSAND/UL (ref 0–0.2)
IMM GRANULOCYTES NFR BLD AUTO: 0 % (ref 0–2)
LACTATE SERPL-SCNC: 2.3 MMOL/L (ref 0.5–2)
LACTATE SERPL-SCNC: 3.8 MMOL/L (ref 0.5–2)
LIPASE SERPL-CCNC: 11 U/L (ref 11–82)
LYMPHOCYTES # BLD AUTO: 2.92 THOUSANDS/ÂΜL (ref 0.6–4.47)
LYMPHOCYTES NFR BLD AUTO: 21 % (ref 14–44)
MCH RBC QN AUTO: 29.4 PG (ref 26.8–34.3)
MCHC RBC AUTO-ENTMCNC: 33.6 G/DL (ref 31.4–37.4)
MCV RBC AUTO: 87 FL (ref 82–98)
MONOCYTES # BLD AUTO: 0.84 THOUSAND/ÂΜL (ref 0.17–1.22)
MONOCYTES NFR BLD AUTO: 6 % (ref 4–12)
NEUTROPHILS # BLD AUTO: 10.3 THOUSANDS/ÂΜL (ref 1.85–7.62)
NEUTS SEG NFR BLD AUTO: 72 % (ref 43–75)
NRBC BLD AUTO-RTO: 0 /100 WBCS
P AXIS: 63 DEGREES
PLATELET # BLD AUTO: 396 THOUSANDS/UL (ref 149–390)
PMV BLD AUTO: 9.7 FL (ref 8.9–12.7)
POTASSIUM SERPL-SCNC: 3.9 MMOL/L (ref 3.5–5.3)
PR INTERVAL: 128 MS
PROT SERPL-MCNC: 7.5 G/DL (ref 6.4–8.4)
QRS AXIS: 69 DEGREES
QRSD INTERVAL: 66 MS
QT INTERVAL: 374 MS
QTC INTERVAL: 543 MS
RBC # BLD AUTO: 4.77 MILLION/UL (ref 3.81–5.12)
SODIUM SERPL-SCNC: 137 MMOL/L (ref 135–147)
T WAVE AXIS: 71 DEGREES
VENTRICULAR RATE: 127 BPM
WBC # BLD AUTO: 14.27 THOUSAND/UL (ref 4.31–10.16)

## 2023-01-25 RX ORDER — ONDANSETRON 2 MG/ML
4 INJECTION INTRAMUSCULAR; INTRAVENOUS ONCE
Status: COMPLETED | OUTPATIENT
Start: 2023-01-25 | End: 2023-01-25

## 2023-01-25 RX ORDER — NICOTINE 21 MG/24HR
1 PATCH, TRANSDERMAL 24 HOURS TRANSDERMAL DAILY
Status: DISCONTINUED | OUTPATIENT
Start: 2023-01-26 | End: 2023-01-27 | Stop reason: HOSPADM

## 2023-01-25 RX ORDER — INSULIN LISPRO 100 [IU]/ML
1-6 INJECTION, SOLUTION INTRAVENOUS; SUBCUTANEOUS
Status: DISCONTINUED | OUTPATIENT
Start: 2023-01-25 | End: 2023-01-27 | Stop reason: HOSPADM

## 2023-01-25 RX ORDER — ATORVASTATIN CALCIUM 20 MG/1
20 TABLET, FILM COATED ORAL DAILY
Qty: 90 TABLET | Refills: 1 | Status: SHIPPED | OUTPATIENT
Start: 2023-01-25

## 2023-01-25 RX ORDER — FAMOTIDINE 10 MG/ML
20 INJECTION, SOLUTION INTRAVENOUS ONCE
Status: COMPLETED | OUTPATIENT
Start: 2023-01-25 | End: 2023-01-25

## 2023-01-25 RX ORDER — SODIUM PHOSPHATE, DIBASIC AND SODIUM PHOSPHATE, MONOBASIC 7; 19 G/133ML; G/133ML
1 ENEMA RECTAL DAILY PRN
Status: DISCONTINUED | OUTPATIENT
Start: 2023-01-25 | End: 2023-01-27 | Stop reason: HOSPADM

## 2023-01-25 RX ORDER — METRONIDAZOLE 500 MG/100ML
500 INJECTION, SOLUTION INTRAVENOUS EVERY 8 HOURS
Status: DISCONTINUED | OUTPATIENT
Start: 2023-01-25 | End: 2023-01-27

## 2023-01-25 RX ORDER — SODIUM CHLORIDE, SODIUM LACTATE, POTASSIUM CHLORIDE, CALCIUM CHLORIDE 600; 310; 30; 20 MG/100ML; MG/100ML; MG/100ML; MG/100ML
125 INJECTION, SOLUTION INTRAVENOUS CONTINUOUS
Status: DISCONTINUED | OUTPATIENT
Start: 2023-01-25 | End: 2023-01-27 | Stop reason: HOSPADM

## 2023-01-25 RX ORDER — ONDANSETRON 2 MG/ML
4 INJECTION INTRAMUSCULAR; INTRAVENOUS EVERY 4 HOURS PRN
Status: DISCONTINUED | OUTPATIENT
Start: 2023-01-25 | End: 2023-01-27 | Stop reason: HOSPADM

## 2023-01-25 RX ORDER — INSULIN LISPRO 100 [IU]/ML
1-6 INJECTION, SOLUTION INTRAVENOUS; SUBCUTANEOUS
Status: DISCONTINUED | OUTPATIENT
Start: 2023-01-26 | End: 2023-01-27 | Stop reason: HOSPADM

## 2023-01-25 RX ORDER — MORPHINE SULFATE 4 MG/ML
4 INJECTION, SOLUTION INTRAMUSCULAR; INTRAVENOUS ONCE
Status: COMPLETED | OUTPATIENT
Start: 2023-01-25 | End: 2023-01-25

## 2023-01-25 RX ORDER — PANTOPRAZOLE SODIUM 40 MG/10ML
40 INJECTION, POWDER, LYOPHILIZED, FOR SOLUTION INTRAVENOUS
Status: DISCONTINUED | OUTPATIENT
Start: 2023-01-25 | End: 2023-01-27 | Stop reason: HOSPADM

## 2023-01-25 RX ORDER — LEVOFLOXACIN 5 MG/ML
750 INJECTION, SOLUTION INTRAVENOUS EVERY 24 HOURS
Status: DISCONTINUED | OUTPATIENT
Start: 2023-01-25 | End: 2023-01-27

## 2023-01-25 RX ADMIN — SODIUM CHLORIDE, SODIUM LACTATE, POTASSIUM CHLORIDE, AND CALCIUM CHLORIDE 125 ML/HR: 600; 310; 30; 20 INJECTION, SOLUTION INTRAVENOUS at 22:04

## 2023-01-25 RX ADMIN — PANTOPRAZOLE SODIUM 40 MG: 40 INJECTION, POWDER, FOR SOLUTION INTRAVENOUS at 16:54

## 2023-01-25 RX ADMIN — ONDANSETRON 4 MG: 2 INJECTION INTRAMUSCULAR; INTRAVENOUS at 13:01

## 2023-01-25 RX ADMIN — MORPHINE SULFATE 2 MG: 2 INJECTION, SOLUTION INTRAMUSCULAR; INTRAVENOUS at 16:52

## 2023-01-25 RX ADMIN — FAMOTIDINE 20 MG: 10 INJECTION, SOLUTION INTRAVENOUS at 13:02

## 2023-01-25 RX ADMIN — PIPERACILLIN AND TAZOBACTAM 3.38 G: 3; .375 INJECTION, POWDER, LYOPHILIZED, FOR SOLUTION INTRAVENOUS at 13:52

## 2023-01-25 RX ADMIN — METRONIDAZOLE 500 MG: 500 INJECTION, SOLUTION INTRAVENOUS at 16:30

## 2023-01-25 RX ADMIN — MORPHINE SULFATE 2 MG: 2 INJECTION, SOLUTION INTRAMUSCULAR; INTRAVENOUS at 19:46

## 2023-01-25 RX ADMIN — IOHEXOL 100 ML: 350 INJECTION, SOLUTION INTRAVENOUS at 13:33

## 2023-01-25 RX ADMIN — SODIUM CHLORIDE, SODIUM LACTATE, POTASSIUM CHLORIDE, AND CALCIUM CHLORIDE 125 ML/HR: 600; 310; 30; 20 INJECTION, SOLUTION INTRAVENOUS at 13:02

## 2023-01-25 RX ADMIN — MORPHINE SULFATE 2 MG: 2 INJECTION, SOLUTION INTRAMUSCULAR; INTRAVENOUS at 22:42

## 2023-01-25 RX ADMIN — SODIUM PHOSPHATE, DIBASIC AND SODIUM PHOSPHATE, MONOBASIC 1 ENEMA: 7; 19 ENEMA RECTAL at 19:48

## 2023-01-25 RX ADMIN — MORPHINE SULFATE 4 MG: 4 INJECTION INTRAVENOUS at 13:02

## 2023-01-25 RX ADMIN — LEVOFLOXACIN 750 MG: 5 INJECTION, SOLUTION INTRAVENOUS at 17:47

## 2023-01-25 RX ADMIN — MORPHINE SULFATE 4 MG: 4 INJECTION INTRAVENOUS at 14:43

## 2023-01-25 NOTE — ASSESSMENT & PLAN NOTE
Meets SIRS criteria based on heart rate and white blood cell count  No obvious source of infection however cover with Levaquin and Flagyl to prevent bacterial translocation    Follow cultures

## 2023-01-25 NOTE — NURSING NOTE
Patient unable to disclose what medications she takes and when she takes them  Home medications in room at this time, they will be sent home with patient's significant other

## 2023-01-25 NOTE — H&P
[address] [address]  H&P- Caro Whitlock 1965, 56 y o  female MRN: [id number]  Unit/Bed#: -[facility] Encounter: [id number]  Primary Care Provider: Rosi Umaña MD   Date and time admitted to hospital: 1/25/2023 12:11 PM    * Abdominal pain  Assessment & Plan  · Patient with multiple abdominal surgeries and history of SBO presenting with typical symptoms  · By patient's admission, constipation seems to be playing a role in this as well as she states that these types of symptoms usually improve after the patient has a large bowel movement  Symptoms have been ongoing and patient states she is has not been moving much flatus over the past day or so  · Patient would likely benefit from a robust bowel regimen in the outpatient  · Patient had NG tube placed in the ED but apparently it was removed  General surgery consulted we appreciate their recommendations  They are recommending a repeat NG tube for gastric decompression    Sepsis [facility])  Assessment & Plan  Meets SIRS criteria based on heart rate and white blood cell count  No obvious source of infection however cover with Levaquin and Flagyl to prevent bacterial translocation  Follow cultures    Obesity, morbid (HCC)  Assessment & Plan  · BMI 35, patient would likely benefit from weight loss therapy    Primary hypertension  Assessment & Plan  Hold hydrochlorothiazide and Lopressor, restart when patient can take p o  Diabetes mellitus type 2 in obese [facility])  Assessment & Plan  Lab Results   Component Value Date    HGBA1C 6 9 (H) 11/02/2022       No results for input(s): POCGLU in the last 72 hours  Blood Sugar Average: Last 72 hrs:     -Hold Lantus and metformin and proceed with sliding scale insulin for now    Acquired hypothyroidism  Assessment & Plan  Holding home levothyroxine, restart when patient can take p o      Lactic acidosis  Assessment & Plan  · Lactic 3 8 in ED, repeat 2 3  · Continue supportive measures and repeat lactic in AM    Generalized anxiety disorder  Assessment & Plan  Holding p o  meds including Klonopin, can consider IV anxiolytic as needed however I suspect we may be able to advance diet and restart p o  meds tomorrow      VTE Pharmacologic Prophylaxis: VTE Score: 2 Low Risk (Score 0-2) - Encourage Ambulation  Code Status: Level 1 - Full Code   Discussion with family: Patient declined call to   Anticipated Length of Stay: Patient will be admitted on an observation basis with an anticipated length of stay of less than 2 midnights secondary to SBO  Total Time for Visit, including Counseling / Coordination of Care: 45 minutes Greater than 50% of this total time spent on direct patient counseling and coordination of care  Chief Complaint: Abdominal pain    History of Present Illness:  Priscilla Jones is a 56 y o  female with a PMH of multiple abdominal surgeries who presents with abdominal discomfort  Patti Yobany tells me that she has had multiple admissions for SBO in the past and her symptomatology is similar to that  Abdominal pain started yesterday and she states that usually if she is having regular bowel movements, the pain resolves  She does not take any pharmacologic bowel regimen but states that she just drinks coffee and that usually keeps her regular  For some reason that did not help her move her bowels and she has had increasing abdominal pain for slightly more than 1 day but it really got worse yesterday  It was associated with some nausea and vomiting  She does not think she is passed much flatus over the past day or so  She came to the ED she was noted to have an elevated lactic acid level and leukocytosis and so was referred for admission  Review of Systems:  Review of Systems   Constitutional: Negative for fever  Respiratory: Negative for chest tightness, shortness of breath and wheezing      Gastrointestinal: Positive for abdominal distention, abdominal pain, constipation and vomiting  Genitourinary: Negative for dysuria  Neurological: Negative for dizziness  All other systems reviewed and are negative  Past Medical and Surgical History:   Past Medical History:   Diagnosis Date   • Addiction to drug [facility])    • Adjustment disorder    • Alcohol abuse    • Alcoholism ([facility] )    • Anxiety    • Bipolar disorder ([place] [address] )    • Bowel obstruction ([facility] )    • Depression    • Diabetes type 2, controlled ([place] [address] )    • Disease of thyroid gland    • Gastritis    • Head injury    • Heart palpitations    • Hyperlipidemia    • Hypertension    • Hypothyroidism    • Psychiatric illness    • PTSD (post-traumatic stress disorder)    • Seizure ([place] [address] )    • Seizures ([facility] )    • Sleep difficulties    • Substance abuse ([place] [address] )    • Suicide attempt ([place] [address] )    • Withdrawal symptoms, drug or narcotic ([place] [address] )        Past Surgical History:   Procedure Laterality Date   • ABDOMINAL SURGERY     • APPENDECTOMY     • BOWEL RESECTION     • CHOLECYSTECTOMY     • ESOPHAGOGASTRODUODENOSCOPY N/A 05/18/2018    Procedure: ESOPHAGOGASTRODUODENOSCOPY (EGD) with bx;  Surgeon: Cesia Hill DO;  Location: [facility]; Service: Gastroenterology   • HYSTERECTOMY     • KNEE SURGERY Bilateral 1991       Meds/Allergies:  Prior to Admission medications    Medication Sig Start Date End Date Taking? Authorizing Provider   Alcohol Swabs 70 % PADS May substitute brand based on insurance coverage   Check glucose TID  6/22/22   Shana Suresh PA-C   ARIPiprazole (ABILIFY) 10 mg tablet Take 1 tablet (10 mg total) by mouth daily  Patient not taking: Reported on 1/4/2023 11/8/22   Sandra Sheridan MD   atorvastatin (LIPITOR) 20 mg tablet Take 1 tablet (20 mg total) by mouth daily 1/25/23   YONG Molina   benztropine (COGENTIN) 1 mg tablet Take 1 mg by mouth daily  Patient not taking: Reported on 1/4/2023 7/23/21   Historical Provider, MD   Blood Glucose Monitoring Suppl (OneTouch Verio Reflect) w/Device KIT May substitute brand based on insurance coverage  Check glucose TID  6/22/22   Tyler Adams PA-C   clonazePAM (KlonoPIN) 0 5 mg tablet TAKE 1 TABLET(0 5 MG) BY MOUTH TWICE DAILY AS NEEDED FOR SEIZURES 1/3/23   Amilcar Norris MD   ergocalciferol (VITAMIN D2) 50,000 units TAKE 1 CAPSULE BY MOUTH 1 TIME A WEEK 7/25/22   Amilcar Norris MD   escitalopram (LEXAPRO) 10 mg tablet TAKE 1 TABLET(10 MG) BY MOUTH DAILY 1/6/23   Amilcar Norris MD   fluticasone-vilanterol (Breo Ellipta) 100-25 mcg/inh inhaler Inhale 1 puff daily Rinse mouth after use    Patient not taking: Reported on 1/4/2023 6/22/22 1/4/23  Marino Addison PA-C   folic acid (FOLVITE) 1 mg tablet Take by mouth daily  Patient not taking: Reported on 1/4/2023    Historical Provider, MD   gabapentin (NEURONTIN) 800 mg tablet Take 1 tablet (800 mg total) by mouth 3 (three) times a day 12/7/22   Amilcar Norris MD   hydrochlorothiazide (HYDRODIURIL) 12 5 mg tablet Take 1 tablet (12 5 mg total) by mouth daily 1/4/23   Amilcar Norris MD   insulin glargine (LANTUS) 100 units/mL subcutaneous injection Inject 25 Units under the skin daily at bedtime 11/8/22   Kelsey Roca MD   levothyroxine 25 mcg tablet Take 1 tablet (25 mcg total) by mouth daily in the early morning 1/4/23   Amilcar Norris MD   meloxicam (Mobic) 15 mg tablet Take 1 tablet (15 mg total) by mouth daily Medication with food discontinue if GI discomfort occurs 1/9/23   Scotty Duval DO   metFORMIN (GLUCOPHAGE) 1000 MG tablet TAKE 1 TABLET(1000 MG) BY MOUTH TWICE DAILY WITH MEALS 3/3/22   Amilcar Norris MD   metoprolol tartrate (LOPRESSOR) 50 mg tablet Take 1 tablet (50 mg total) by mouth every 12 (twelve) hours 11/14/22   Amilcar Norris MD   Microlet Lancets [address] [facility]  1/3/22   Historical Provider, MD   nicotine (NICODERM CQ) 21 mg/24 hr TD 24 hr patch Place 1 patch on the skin daily 11/8/22   Kelsey Roca MD   OneTouch Delica Lancets 96Q MISC May substitute brand based on insurance coverage  Check glucose TID  6/22/22   Dinh Berger PA-C   pantoprazole (PROTONIX) 40 mg tablet Take 1 tablet (40 mg total) by mouth daily  Patient not taking: Reported on 1/4/2023 7/29/22   Laura Gardner MD   Semaglutide,0 25 or 0 5MG/DOS, (Ozempic, 0 25 or 0 5 MG/DOSE,) 2 MG/1 5ML SOPN Inject 0 5 mg under the skin once a week 1/4/23   Laura Gardner MD   traZODone (DESYREL) 100 mg tablet TAKE 2 TABLETS(200 MG) BY MOUTH DAILY AT BEDTIME 1/25/23   Laura Gardner MD   atorvastatin (LIPITOR) 20 mg tablet Take 1 tablet (20 mg total) by mouth daily 3/31/22 1/25/23  YONG Peña   traZODone (DESYREL) 100 mg tablet Take 2 tablets (200 mg total) by mouth daily at bedtime 11/14/22 1/25/23  Larua Gardner MD     I have reviewed home medications with patient personally  Allergies:    Allergies   Allergen Reactions   • Losartan Cough   • Latex Rash       Social History:  Marital Status:    Occupation: Unknown  Patient Pre-hospital Living Situation: Home  Patient Pre-hospital Level of Mobility: walks  Patient Pre-hospital Diet Restrictions: None  Substance Use History:   Social History     Substance and Sexual Activity   Alcohol Use Not Currently   • Alcohol/week: 6 0 standard drinks   • Types: 6 Cans of beer per week    Comment: last drink on 08/15/20     Social History     Tobacco Use   Smoking Status Every Day   • Packs/day: 0 50   • Years: 25 00   • Pack years: 12 50   • Types: Cigarettes   • Passive exposure: Current   Smokeless Tobacco Never   Tobacco Comments    smokes like 5 a day(06/24/2022)     Social History     Substance and Sexual Activity   Drug Use Never       Family History:  Family History   Problem Relation Age of Onset   • Diabetes Father    • Bipolar disorder Mother        Physical Exam:     Vitals:   Blood Pressure: 155/69 (01/25/23 1559)  Pulse: 93 (01/25/23 1559)  Temperature: (!) 97 1 °F (36 2 °C) (01/25/23 1559)  Temp Source: Tympanic (01/25/23 1559)  Respirations: 18 (01/25/23 1559)  Height: 5' 2" (157 5 cm) (01/25/23 1559)  Weight - Scale: 87 5 kg (193 lb) (01/25/23 1559)  SpO2: 95 % (01/25/23 1559)    Physical Exam  Vitals reviewed  Constitutional:       General: She is in acute distress  Comments: Moderate discomfort due to abdominal pain   HENT:      Head: Normocephalic  Mouth/Throat:      Mouth: Mucous membranes are moist    Eyes:      General: No scleral icterus  Pupils: Pupils are equal, round, and reactive to light  Cardiovascular:      Rate and Rhythm: Normal rate and regular rhythm  Pulses: Normal pulses  Heart sounds: Normal heart sounds  Pulmonary:      Effort: Pulmonary effort is normal  No respiratory distress  Breath sounds: No wheezing, rhonchi or rales  Abdominal:      General: There is distension  Tenderness: There is abdominal tenderness  There is no guarding or rebound  Musculoskeletal:      Right lower leg: No edema  Left lower leg: No edema  Skin:     Capillary Refill: Capillary refill takes less than 2 seconds  Coloration: Skin is not jaundiced  Findings: No rash  Neurological:      General: No focal deficit present  Mental Status: She is alert and oriented to person, place, and time  Motor: No weakness     Psychiatric:         Mood and Affect: Mood normal          Behavior: Behavior normal           Additional Data:     Lab Results:  Results from last 7 days   Lab Units 01/25/23  1251   WBC Thousand/uL 14 27*   HEMOGLOBIN g/dL 14 0   HEMATOCRIT % 41 7   PLATELETS Thousands/uL 396*   NEUTROS PCT % 72   LYMPHS PCT % 21   MONOS PCT % 6   EOS PCT % 1     Results from last 7 days   Lab Units 01/25/23  1251   SODIUM mmol/L 137   POTASSIUM mmol/L 3 9   CHLORIDE mmol/L 99   CO2 mmol/L 26   BUN mg/dL 8   CREATININE mg/dL 0 71   ANION GAP mmol/L 12   CALCIUM mg/dL 10 0   ALBUMIN g/dL 4 4   TOTAL BILIRUBIN mg/dL 0 36   ALK PHOS U/L 116*   ALT U/L 51 AST U/L 26   GLUCOSE RANDOM mg/dL 170*                 Results from last 7 days   Lab Units 01/25/23  1531 01/25/23  1251   LACTIC ACID mmol/L 2 3* 3 8*       Lines/Drains:  Invasive Devices     Peripheral Intravenous Line  Duration           Long-Dwell Peripheral IV (Midline) 01/25/23 Right Brachial <1 day              Imaging: Personally reviewed the following imaging: abdominal/pelvic CT  CT abdomen pelvis w contrast   Final Result by Sabina Donaldson MD (01/25 1403)      No acute intra-abdominal abnormality  Workstation performed: [id number]         XR abdomen obstruction series   Final Result by Larisa Smith MD (01/25 1317)   Groundglass haziness in the right lung base may represent developing parenchymal process  Follow-up recommended  Nonobstructive bowel gas pattern  Workstation performed: [id number]             EKG and Other Studies Reviewed on Admission:   · EKG: Sinus Tachycardia       ** Please Note: This note has been constructed using a voice recognition system   **

## 2023-01-25 NOTE — ASSESSMENT & PLAN NOTE
Holding p o  meds including Klonopin, can consider IV anxiolytic as needed however I suspect we may be able to advance diet and restart p o  meds tomorrow

## 2023-01-25 NOTE — ED PROVIDER NOTES
History  Chief Complaint   Patient presents with   • Abdominal Pain     Reports abdominal pain and bloating since last night  Multiple abdominal surgeries  1 episode of vomiting this morning  Denies diarrhea  LBM yesterday, small amount that was slightly hard  This is 51y old came for having abdominal pain since yesterday   Pt is severe and has marked abdominal distension  Pt moved her bowel last night small amount  Pt has vomitted x1  Pt denies fever and denies urinary symptoms  Pt had h/o of abdominal obstruction in the past  And had multiple surgeries for it  Pt had h/o of hysterectomy, cholecystectomy   Last meal was yesterday   Pt has no chest pain , sob  Pt in no distress  Pt had h/o of psychiatric disorder and has h/o of bipolar disorder  Pt does not want to lie down and want to stand up   Prior to Admission Medications   Prescriptions Last Dose Informant Patient Reported? Taking? ARIPiprazole (ABILIFY) 10 mg tablet   No No   Sig: Take 1 tablet (10 mg total) by mouth daily   Patient not taking: Reported on 1/4/2023   Alcohol Swabs 70 % PADS   No No   Sig: May substitute brand based on insurance coverage  Check glucose TID  Blood Glucose Monitoring Suppl (OneTouch Verio Reflect) w/Device KIT   No No   Sig: May substitute brand based on insurance coverage  Check glucose TID  Microlet Lancets MISC   Yes No   OneTouch Delica Lancets 92O MISC   No No   Sig: May substitute brand based on insurance coverage  Check glucose TID     Semaglutide,0 25 or 0 5MG/DOS, (Ozempic, 0 25 or 0 5 MG/DOSE,) 2 MG/1 5ML SOPN   No No   Sig: Inject 0 5 mg under the skin once a week   atorvastatin (LIPITOR) 20 mg tablet   No No   Sig: Take 1 tablet (20 mg total) by mouth daily   benztropine (COGENTIN) 1 mg tablet   Yes No   Sig: Take 1 mg by mouth daily   Patient not taking: Reported on 1/4/2023   clonazePAM (KlonoPIN) 0 5 mg tablet   No No   Sig: TAKE 1 TABLET(0 5 MG) BY MOUTH TWICE DAILY AS NEEDED FOR SEIZURES ergocalciferol (VITAMIN D2) 50,000 units   No No   Sig: TAKE 1 CAPSULE BY MOUTH 1 TIME A WEEK   escitalopram (LEXAPRO) 10 mg tablet   No No   Sig: TAKE 1 TABLET(10 MG) BY MOUTH DAILY   fluticasone-vilanterol (Breo Ellipta) 100-25 mcg/inh inhaler   No No   Sig: Inhale 1 puff daily Rinse mouth after use     Patient not taking: Reported on 1/2/8826   folic acid (FOLVITE) 1 mg tablet   Yes No   Sig: Take by mouth daily   Patient not taking: Reported on 1/4/2023   gabapentin (NEURONTIN) 800 mg tablet   No No   Sig: Take 1 tablet (800 mg total) by mouth 3 (three) times a day   hydrochlorothiazide (HYDRODIURIL) 12 5 mg tablet   No No   Sig: Take 1 tablet (12 5 mg total) by mouth daily   insulin glargine (LANTUS) 100 units/mL subcutaneous injection   No No   Sig: Inject 25 Units under the skin daily at bedtime   levothyroxine 25 mcg tablet   No No   Sig: Take 1 tablet (25 mcg total) by mouth daily in the early morning   meloxicam (Mobic) 15 mg tablet   No No   Sig: Take 1 tablet (15 mg total) by mouth daily Medication with food discontinue if GI discomfort occurs   metFORMIN (GLUCOPHAGE) 1000 MG tablet   No No   Sig: TAKE 1 TABLET(1000 MG) BY MOUTH TWICE DAILY WITH MEALS   metoprolol tartrate (LOPRESSOR) 50 mg tablet   No No   Sig: Take 1 tablet (50 mg total) by mouth every 12 (twelve) hours   nicotine (NICODERM CQ) 21 mg/24 hr TD 24 hr patch   No No   Sig: Place 1 patch on the skin daily   pantoprazole (PROTONIX) 40 mg tablet   No No   Sig: Take 1 tablet (40 mg total) by mouth daily   Patient not taking: Reported on 1/4/2023   traZODone (DESYREL) 100 mg tablet   No No   Sig: TAKE 2 TABLETS(200 MG) BY MOUTH DAILY AT BEDTIME      Facility-Administered Medications: None       Past Medical History:   Diagnosis Date   • Addiction to drug [facility])    • Adjustment disorder    • Alcohol abuse    • [facility]ism ([facility] )    • Anxiety    • Bipolar disorder ([facility] )    • Bowel obstruction ([facility] )    • Depression    • Diabetes type 2, controlled [facility])    • Disease of thyroid gland    • Gastritis    • Head injury    • Heart palpitations    • Hyperlipidemia    • Hypertension    • Hypothyroidism    • Psychiatric illness    • PTSD (post-traumatic stress disorder)    • Seizure ([facility] )    • Seizures ([facility] )    • Sleep difficulties    • Substance abuse ([facility] )    • Suicide attempt ([facility] )    • Withdrawal symptoms, drug or narcotic ([place] [address] )        Past Surgical History:   Procedure Laterality Date   • ABDOMINAL SURGERY     • APPENDECTOMY     • BOWEL RESECTION     • CHOLECYSTECTOMY     • ESOPHAGOGASTRODUODENOSCOPY N/A 05/18/2018    Procedure: ESOPHAGOGASTRODUODENOSCOPY (EGD) with bx;  Surgeon: Criselda Gomez DO;  Location: [facility]; Service: Gastroenterology   • HYSTERECTOMY     • KNEE SURGERY Bilateral 1991       Family History   Problem Relation Age of Onset   • Diabetes Father    • Bipolar disorder Mother      I have reviewed and agree with the history as documented  E-Cigarette/Vaping   • E-Cigarette Use Never User      E-Cigarette/Vaping Substances   • Nicotine No    • THC No    • CBD No    • Flavoring No    • Other No    • Unknown No      Social History     Tobacco Use   • Smoking status: Every Day     Packs/day: 0 50     Years: 25 00     Pack years: 12 50     Types: Cigarettes   • Smokeless tobacco: Never   • Tobacco comments:     smokes like 5 a day(06/24/2022)   Vaping Use   • Vaping Use: Never used   Substance Use Topics   • Alcohol use: Not Currently     Alcohol/week: 6 0 standard drinks     Types: 6 Cans of beer per week     Comment: last drink on 08/15/20   • Drug use: No       Review of Systems   Constitutional: Negative for fatigue and fever  HENT: Negative for congestion, ear discharge, ear pain, sinus pressure, sinus pain, sneezing, sore throat, tinnitus and trouble swallowing  Respiratory: Negative for cough, shortness of breath and wheezing  Cardiovascular: Negative for chest pain and palpitations     Gastrointestinal: Positive for abdominal distention, abdominal pain and nausea  Negative for diarrhea and vomiting  Endocrine: Negative for polydipsia, polyphagia and polyuria  Genitourinary: Negative for difficulty urinating, dysuria, flank pain and frequency  Musculoskeletal: Negative for back pain, myalgias, neck pain and neck stiffness  Skin: Negative for color change, pallor and rash  Neurological: Negative for dizziness, seizures, speech difficulty, weakness, light-headedness and headaches  Psychiatric/Behavioral: Negative for agitation and behavioral problems  Physical Exam  Physical Exam  Vitals and nursing note reviewed  Constitutional:       General: She is not in acute distress  Appearance: She is well-developed  She is obese  She is ill-appearing  She is not toxic-appearing or diaphoretic  HENT:      Head: Normocephalic and atraumatic  Mouth/Throat:      Pharynx: No pharyngeal swelling or oropharyngeal exudate  Eyes:      General: No scleral icterus  Extraocular Movements: Extraocular movements intact  Cardiovascular:      Rate and Rhythm: Normal rate and regular rhythm  Heart sounds: Normal heart sounds  No murmur heard  No friction rub  No gallop  Pulmonary:      Effort: Pulmonary effort is normal  No respiratory distress  Breath sounds: Normal breath sounds  No wheezing, rhonchi or rales  Chest:      Chest wall: No tenderness  Abdominal:      General: A surgical scar is present  Bowel sounds are normal  There is distension  There is no abdominal bruit  There are no signs of injury  Palpations: Abdomen is soft  There is no shifting dullness, fluid wave, hepatomegaly, splenomegaly, mass or pulsatile mass  Tenderness: There is generalized abdominal tenderness  There is no right CVA tenderness, left CVA tenderness, guarding or rebound  Negative signs include Rizvi's sign, Rovsing's sign, McBurney's sign, psoas sign and obturator sign  Hernia: No hernia is present   There is no hernia in the umbilical area, ventral area, left inguinal area, right femoral area, left femoral area or right inguinal area  Musculoskeletal:         General: No tenderness or deformity  Normal range of motion  Cervical back: Normal range of motion and neck supple  Skin:     General: Skin is warm and dry  Capillary Refill: Capillary refill takes less than 2 seconds  Coloration: Skin is not cyanotic, jaundiced, mottled or pale  Findings: No erythema or rash  Neurological:      Mental Status: She is alert and oriented to person, place, and time     Psychiatric:         Behavior: Behavior normal          Vital Signs  ED Triage Vitals   Temperature Pulse Respirations Blood Pressure SpO2   01/25/23 1216 01/25/23 1216 01/25/23 1216 01/25/23 1216 01/25/23 1216   97 9 °F (36 6 °C) (!) 128 22 158/85 97 %      Temp Source Heart Rate Source Patient Position - Orthostatic VS BP Location FiO2 (%)   01/25/23 1216 01/25/23 1216 01/25/23 1216 01/25/23 1216 --   Oral Monitor Sitting Right arm       Pain Score       01/25/23 1215       10 - Worst Possible Pain           Vitals:    01/25/23 1216 01/25/23 1449   BP: 158/85 159/76   Pulse: (!) 128 98   Patient Position - Orthostatic VS: Sitting Lying         Visual Acuity      ED Medications  Medications   lactated ringers infusion (125 mL/hr Intravenous New Bag 1/25/23 1302)   ondansetron (ZOFRAN) injection 4 mg (4 mg Intravenous Given 1/25/23 1301)   morphine injection 4 mg (4 mg Intravenous Given 1/25/23 1302)   Famotidine (PF) (PEPCID) injection 20 mg (20 mg Intravenous Given 1/25/23 1302)   piperacillin-tazobactam (ZOSYN) IVPB 3 375 g (3 375 g Intravenous New Bag 1/25/23 1352)   iohexol (OMNIPAQUE) 350 MG/ML injection (SINGLE-DOSE) 100 mL (100 mL Intravenous Given 1/25/23 1333)   morphine injection 4 mg (4 mg Intravenous Given 1/25/23 1443)       Diagnostic Studies  Results Reviewed     Procedure Component Value Units Date/Time    Lactic acid, plasma [[id number]]  (Abnormal) Collected: 01/25/23 1251    Lab Status: Final result Specimen: Blood from Arm, Right Updated: 01/25/23 1326     LACTIC ACID 3 8 mmol/L     Narrative:      Result may be elevated if tourniquet was used during collection  Lactic acid 2 Hours [[id number]]     Lab Status: No result Specimen: Blood     Comprehensive metabolic panel [[id number]]  (Abnormal) Collected: 01/25/23 1251    Lab Status: Final result Specimen: Blood from Arm, Right Updated: 01/25/23 1320     Sodium 137 mmol/L      Potassium 3 9 mmol/L      Chloride 99 mmol/L      CO2 26 mmol/L      ANION GAP 12 mmol/L      BUN 8 mg/dL      Creatinine 0 71 mg/dL      Glucose 170 mg/dL      Calcium 10 0 mg/dL      AST 26 U/L      ALT 51 U/L      Alkaline Phosphatase 116 U/L      Total Protein 7 5 g/dL      Albumin 4 4 g/dL      Total Bilirubin 0 36 mg/dL      eGFR 96 ml/min/1 73sq m     Narrative:      [place] guidelines for Chronic Kidney Disease (CKD):   •  Stage 1 with normal or high GFR (GFR > 90 mL/min/1 73 square meters)  •  Stage 2 Mild CKD (GFR = 60-89 mL/min/1 73 square meters)  •  Stage 3A Moderate CKD (GFR = 45-59 mL/min/1 73 square meters)  •  Stage 3B Moderate CKD (GFR = 30-44 mL/min/1 73 square meters)  •  Stage 4 Severe CKD (GFR = 15-29 mL/min/1 73 square meters)  •  Stage 5 End Stage CKD (GFR <15 mL/min/1 73 square meters)  Note: GFR calculation is accurate only with a steady state creatinine    Lipase [[id number]]  (Normal) Collected: 01/25/23 1251    Lab Status: Final result Specimen: Blood from Arm, Right Updated: 01/25/23 1320     Lipase 11 u/L     Blood culture #2 [[id number]] Updated: 01/25/23 1317    Lab Status: In process Specimen: Blood     Blood culture #1 [[id number]] Collected: 01/25/23 1314    Lab Status:  In process Specimen: Blood from Arm, Left Updated: 01/25/23 1317    CBC and differential [[id number]]  (Abnormal) Collected: 01/25/23 1251    Lab Status: Final result Specimen: Blood from Arm, Right Updated: 01/25/23 1258     WBC 14 27 Thousand/uL      RBC 4 77 Million/uL      Hemoglobin 14 0 g/dL      Hematocrit 41 7 %      MCV 87 fL      MCH 29 4 pg      MCHC 33 6 g/dL      RDW 13 4 %      MPV 9 7 fL      Platelets 750 Thousands/uL      nRBC 0 /100 WBCs      Neutrophils Relative 72 %      Immat GRANS % 0 %      Lymphocytes Relative 21 %      Monocytes Relative 6 %      Eosinophils Relative 1 %      Basophils Relative 0 %      Neutrophils Absolute 10 30 Thousands/µL      Immature Grans Absolute 0 05 Thousand/uL      Lymphocytes Absolute 2 92 Thousands/µL      Monocytes Absolute 0 84 Thousand/µL      Eosinophils Absolute 0 13 Thousand/µL      Basophils Absolute 0 03 Thousands/µL                  CT abdomen pelvis w contrast   Final Result by Saul Burk MD (01/25 1403)      No acute intra-abdominal abnormality  Workstation performed: [id number]         XR abdomen obstruction series   Final Result by Sandra Cardoza MD (01/25 1317)   Groundglass haziness in the right lung base may represent developing parenchymal process  Follow-up recommended  Nonobstructive bowel gas pattern           Workstation performed: [id number]                    Procedures  ECG 12 Lead Documentation Only    Date/Time: 1/25/2023 12:39 PM  Performed by: Lexi Escobar MD  Authorized by: Lexi Escobar MD     Indications / Diagnosis:  Abdominal pain  Patient location:  ED and bedside  Interpretation:     Interpretation: abnormal    Rate:     ECG rate:  127    ECG rate assessment: tachycardic    Rhythm:     Rhythm: sinus tachycardia    Ectopy:     Ectopy: none    QRS:     QRS axis:  Normal    QRS intervals:  Normal  Conduction:     Conduction: normal    ST segments:     ST segments:  Normal  T waves:     T waves: normal    Q waves:     Q waves:  V1, V2 and V3  Other findings:     Other findings: poor R wave progression               ED Course  ED Course as of 01/25/23 1517   Wed Jan 25, 2023 1315 Pt has extensive abdominal distension and has sob, will insert NG tube to suction to decompress abdomen  Medical Decision Making  This is 51y old came for having severe abdominal pain and distension since last night   Last BM was yesterday and it was small amount  Pt had vomited x1, but has no fever  On arrival pt was tachycardic [id number],  And on exam has extensive abdominal distension and tenderness  Pt has WBC 14k, and Lactic acid 3 8  Pt had multiple abdominal surgeries and had surgeries for abdominal obstruction  When we got the labs with high lactic acid pt started on zosyn , and because of extensive distension NG inserted , but nothing comes out , and NG tube to be D/C  Because of pain , and abdominal distension , and high lactic acid will admit and monitor her response to the treatment  Pt had h/o of cholecystectomy , hysterectmoy , and lysis of adhesions  DX; abdominal pain 2ry to ( intestinal obstruction- volvulus- adhesions- colitis- enteritis)   contacted surgery on call dr Soto Renee   Abdominal pain, unspecified abdominal location: acute illness or injury  Lactic acid increased: acute illness or injury  Amount and/or Complexity of Data Reviewed  Labs: ordered  Details: WBC 14k, Lactic acid 3 8   Radiology: ordered  Details: ct abdomen pelvis ; no acute findings  ECG/medicine tests: ordered and independent interpretation performed  Details: EKG; Sinus tachy rate 127/min,poor R wave progression in chest leads      Risk  Prescription drug management            Disposition  Final diagnoses:   Abdominal pain, unspecified abdominal location   Lactic acid increased     Time reflects when diagnosis was documented in both MDM as applicable and the Disposition within this note     Time User Action Codes Description Comment    1/25/2023  2:26 PM Delores Rodriguez Add [R10 9] Abdominal pain, unspecified abdominal location     1/25/2023  2:26 PM Anabellamichellewill Kieran Add [F97 20] Lactic acid increased       ED Disposition     ED Disposition   Admit    Condition   Stable    Date/Time   Wed Jan 25, 2023  2:41 PM    Comment   Case was discussed with dr LETITIA paredes  and the patient's admission status was agreed to be admitted to observation to the service of Dr Florina Trejo    None         Patient's Medications   Discharge Prescriptions    No medications on file       No discharge procedures on file      PDMP Review       Value Time User    PDMP Reviewed  Yes 1/4/2023  3:14 PM Abdiaziz Mae MD          ED Provider  Electronically Signed by           Merle Oconnell MD  01/25/23 6094

## 2023-01-25 NOTE — PLAN OF CARE
Problem: Nutrition/Hydration-ADULT  Goal: Nutrient/Hydration intake appropriate for improving, restoring or maintaining nutritional needs  Description: Monitor and assess patient's nutrition/hydration status for malnutrition  Collaborate with interdisciplinary team and initiate plan and interventions as ordered  Monitor patient's weight and dietary intake as ordered or per policy  Utilize nutrition screening tool and intervene as necessary  Determine patient's food preferences and provide high-protein, high-caloric foods as appropriate       INTERVENTIONS:  - Monitor oral intake, urinary output, labs, and treatment plans  - Assess nutrition and hydration status and recommend course of action  - Evaluate amount of meals eaten  - Assist patient with eating if necessary   - Allow adequate time for meals  - Recommend/ encourage appropriate diets, oral nutritional supplements, and vitamin/mineral supplements  - Order, calculate, and assess calorie counts as needed  - Recommend, monitor, and adjust tube feedings and TPN/PPN based on assessed needs  - Assess need for intravenous fluids  - Provide specific nutrition/hydration education as appropriate  - Include patient/family/caregiver in decisions related to nutrition  1/25/2023 1720 by Janiya Brennan RN  Outcome: Progressing  1/25/2023 1719 by Janiya Brennan RN  Outcome: Progressing     Problem: PAIN - ADULT  Goal: Verbalizes/displays adequate comfort level or baseline comfort level  Description: Interventions:  - Encourage patient to monitor pain and request assistance  - Assess pain using appropriate pain scale  - Administer analgesics based on type and severity of pain and evaluate response  - Implement non-pharmacological measures as appropriate and evaluate response  - Consider cultural and social influences on pain and pain management  - Notify physician/advanced practitioner if interventions unsuccessful or patient reports new pain  Outcome: Progressing     Problem: INFECTION - ADULT  Goal: Absence or prevention of progression during hospitalization  Description: INTERVENTIONS:  - Assess and monitor for signs and symptoms of infection  - Monitor lab/diagnostic results  - Monitor all insertion sites, i e  indwelling lines, tubes, and drains  - Monitor endotracheal if appropriate and nasal secretions for changes in amount and color  - Pike appropriate cooling/warming therapies per order  - Administer medications as ordered  - Instruct and encourage patient and family to use good hand hygiene technique  - Identify and instruct in appropriate isolation precautions for identified infection/condition  Outcome: Progressing  Goal: Absence of fever/infection during neutropenic period  Description: INTERVENTIONS:  - Monitor WBC    Outcome: Progressing     Problem: SAFETY ADULT  Goal: Patient will remain free of falls  Description: INTERVENTIONS:  - Educate patient/family on patient safety including physical limitations  - Instruct patient to call for assistance with activity   - Consult OT/PT to assist with strengthening/mobility   - Keep Call bell within reach  - Keep bed low and locked with side rails adjusted as appropriate  - Keep care items and personal belongings within reach  - Initiate and maintain comfort rounds  - Make Fall Risk Sign visible to staff  - Offer Toileting every 2 Hours, in advance of need  - Initiate/Maintain bed/chair alarm  - Consider moving patient to room near nurses station  Outcome: Progressing  Goal: Maintain or return to baseline ADL function  Description: INTERVENTIONS:  -  Assess patient's ability to carry out ADLs; assess patient's baseline for ADL function and identify physical deficits which impact ability to perform ADLs (bathing, care of mouth/teeth, toileting, grooming, dressing, etc )  - Assess/evaluate cause of self-care deficits   - Assess range of motion  - Assess patient's mobility; develop plan if impaired  - Assess patient's need for assistive devices and provide as appropriate  - Encourage maximum independence but intervene and supervise when necessary  - Involve family in performance of ADLs  - Assess for home care needs following discharge   - Consider OT consult to assist with ADL evaluation and planning for discharge  - Provide patient education as appropriate  Outcome: Progressing  Goal: Maintains/Returns to pre admission functional level  Description: INTERVENTIONS:  - Perform BMAT or MOVE assessment daily    - Set and communicate daily mobility goal to care team and patient/family/caregiver  - Collaborate with rehabilitation services on mobility goals if consulted  - Perform Range of Motion 3 times a day  - Reposition patient every 2 hours    - Dangle patient 3 times a day  - Stand patient 3 times a day  - Ambulate patient 3 times a day  - Out of bed to chair 3 times a day   - Out of bed for meals 3 times a day  - Out of bed for toileting  - Record patient progress and toleration of activity level   Outcome: Progressing     Problem: DISCHARGE PLANNING  Goal: Discharge to home or other facility with appropriate resources  Description: INTERVENTIONS:  - Identify barriers to discharge w/patient and caregiver  - Arrange for needed discharge resources and transportation as appropriate  - Identify discharge learning needs (meds, wound care, etc )  - Arrange for interpretive services to assist at discharge as needed  - Refer to Case Management Department for coordinating discharge planning if the patient needs post-hospital services based on physician/advanced practitioner order or complex needs related to functional status, cognitive ability, or social support system  Outcome: Progressing     Problem: Knowledge Deficit  Goal: Patient/family/caregiver demonstrates understanding of disease process, treatment plan, medications, and discharge instructions  Description: Complete learning assessment and assess knowledge base   Interventions:  - Provide teaching at level of understanding  - Provide teaching via preferred learning methods  Outcome: Progressing     Problem: GASTROINTESTINAL - ADULT  Goal: Minimal or absence of nausea and/or vomiting  Description: INTERVENTIONS:  - Administer IV fluids if ordered to ensure adequate hydration  - Maintain NPO status until nausea and vomiting are resolved  - Nasogastric tube if ordered  - Administer ordered antiemetic medications as needed  - Provide nonpharmacologic comfort measures as appropriate  - Advance diet as tolerated, if ordered  - Consider nutrition services referral to assist patient with adequate nutrition and appropriate food choices  Outcome: Progressing  Goal: Maintains or returns to baseline bowel function  Description: INTERVENTIONS:  - Assess bowel function  - Encourage oral fluids to ensure adequate hydration  - Administer IV fluids if ordered to ensure adequate hydration  - Administer ordered medications as needed  - Encourage mobilization and activity  - Consider nutritional services referral to assist patient with adequate nutrition and appropriate food choices  Outcome: Progressing

## 2023-01-25 NOTE — ASSESSMENT & PLAN NOTE
Lab Results   Component Value Date    HGBA1C 6 9 (H) 11/02/2022       No results for input(s): POCGLU in the last 72 hours      Blood Sugar Average: Last 72 hrs:     -Hold Lantus and metformin and proceed with sliding scale insulin for now

## 2023-01-25 NOTE — ASSESSMENT & PLAN NOTE
· Patient with multiple abdominal surgeries and history of SBO presenting with typical symptoms  · By patient's admission, constipation seems to be playing a role in this as well as she states that these types of symptoms usually improve after the patient has a large bowel movement  Symptoms have been ongoing and patient states she is has not been moving much flatus over the past day or so  · Patient would likely benefit from a robust bowel regimen in the outpatient  · Patient had NG tube placed in the ED but apparently it was removed  General surgery consulted we appreciate their recommendations   They are recommending a repeat NG tube for gastric decompression

## 2023-01-25 NOTE — CONSULTS
Consultation - General Surgery   Carrington Emeigh 56 y o  female MRN: [id number]  Unit/Bed#: -01 Encounter: [id number]       ASSESSMENT:  55 y/o F w/ hx of multiple abdominal surgeries and hx of bowel obstructions who present with 1 day of abdominal pain, N/V, constipation  AF, , other VSS  Meets SIRS criteria with leukocytosis and tachycardia  Elevated lactic acid, 3 8  WBC 14 27  CTAP w/o any acute abdominal pathology, upon review there does appear to be mild SB distension      PLAN:   No acute surgical intervention needed at this time  Recommend conservative management/supportive care  Recommend NG tube insertion due to patient's sx N/V and abdominal distension   NGT to low intermittent suction, flush NGT Q 4 hours for patency  Serial abdominal exams  IV fluids  Analgesia/antiemetics PRN  Check AM labs, trend vitals/labs  Surgery will continue to follow  D/w Dr Rhett Suarez     SUBJECTIVE:  Chief Complaint: Abdominal pain    HPI:  Carrington Frank is a 56 y o  female with PMH anxiety/depression, bipolar disorder, PTSD, substance abuse, hypothyroidism, hyperlipidemia, hypertension and extensive surgical history including cholecystectomy, appendectomy, hysterectomy, and exploratory laparotomy lysis of adhesions history of recurrent bowel obstructions  Patient reports that her abdominal pain started during the day yesterday, worsened overnight  Describes the pain as severe started in the bilateral lower quadrants, but has now become generalized with abdominal distention/bloating  Had also noted constipation when her regular bowel movements are diarrhea/liquid stools at a baseline  Her last bowel movement was yesterday, had 1 tiny hard stool otherwise her last bowel movement was 2 days ago  Notes that this pain and symptoms were similar to her previous bowel obstructions, where she has not had a problem with these for over 8 years    Says she would self medicate by having coffee and usually her symptoms would subside, however her pain persisted  Associated nausea/vomiting  Denies fever/chills, hematochezia/melena  ROS:  Review of Systems   Constitutional: Positive for appetite change  Negative for chills and fever  HENT: Negative  Eyes: Negative  Respiratory: Negative  Cardiovascular: Negative  Gastrointestinal: Positive for abdominal distention, abdominal pain, constipation and vomiting  Negative for blood in stool and diarrhea  Genitourinary: Negative  Musculoskeletal: Negative  Skin: Negative  Neurological: Negative  Hematological: Negative  Psychiatric/Behavioral: Negative  Historical Information   Past Medical History:   Diagnosis Date   • Addiction to drug [facility])    • Adjustment disorder    • Alcohol abuse    • Alcoholism ([place] [address] )    • Anxiety    • Bipolar disorder ([place] [address] )    • Bowel obstruction ([place] [address] )    • Depression    • Diabetes type 2, controlled ([place] [address] )    • Disease of thyroid gland    • Gastritis    • Head injury    • Heart palpitations    • Hyperlipidemia    • Hypertension    • Hypothyroidism    • Psychiatric illness    • PTSD (post-traumatic stress disorder)    • Seizure ([place] [address] )    • Seizures ([place] [address] )    • Sleep difficulties    • Substance abuse ([place] [address] )    • Suicide attempt ([place] [address] )    • Withdrawal symptoms, drug or narcotic ([place] [address] )      Past Surgical History:   Procedure Laterality Date   • ABDOMINAL SURGERY     • APPENDECTOMY     • BOWEL RESECTION     • CHOLECYSTECTOMY     • ESOPHAGOGASTRODUODENOSCOPY N/A 05/18/2018    Procedure: ESOPHAGOGASTRODUODENOSCOPY (EGD) with bx;  Surgeon: Sim Templeton DO;  Location: [facility];   Service: Gastroenterology   • HYSTERECTOMY     • KNEE SURGERY Bilateral 1991     Social History   Social History     Substance and Sexual Activity   Alcohol Use Not Currently   • Alcohol/week: 6 0 standard drinks   • Types: 6 Cans of beer per week    Comment: last drink on 08/15/20     Social History     Substance and Sexual Activity   Drug Use No Social History     Tobacco Use   Smoking Status Every Day   • Packs/day: 0 50   • Years: 25 00   • Pack years: 12 50   • Types: Cigarettes   Smokeless Tobacco Never   Tobacco Comments    smokes like 5 a day(06/24/2022)       Family History:   Family History   Problem Relation Age of Onset   • Diabetes Father    • Bipolar disorder Mother        Meds/Allergies   all medications and allergies reviewed  Allergies   Allergen Reactions   • Losartan Cough   • Latex Rash         OBJECTIVE:  First Vitals:   Blood Pressure: 158/85 (01/25/23 1216)  Pulse: (!) 128 (01/25/23 1216)  Temperature: 97 9 °F (36 6 °C) (01/25/23 1216)  Temp Source: Oral (01/25/23 1216)  Respirations: 22 (01/25/23 1216)  Height: 5' 2" (157 5 cm) (01/25/23 1216)  Weight - Scale: 87 5 kg (193 lb) (01/25/23 1216)  SpO2: 97 % (01/25/23 1216) Body mass index is 35 3 kg/m²  Current Vitals:   Blood Pressure: 159/76 (01/25/23 1449)  Pulse: 98 (01/25/23 1449)  Temperature: 97 9 °F (36 6 °C) (01/25/23 1216)  Temp Source: Oral (01/25/23 1216)  Respirations: (!) 26 (01/25/23 1449)  Height: 5' 2" (157 5 cm) (01/25/23 1216)  Weight - Scale: 87 5 kg (193 lb) (01/25/23 1216)  SpO2: 98 % (01/25/23 1449)   I/Os:  No intake or output data in the 24 hours ending 01/25/23 1526  Lines/Drains:  Invasive Devices     Peripheral Intravenous Line  Duration           Long-Dwell Peripheral IV (Midline) 01/25/23 Right Brachial <1 day                Physical Exam  Constitutional:       General: She is in acute distress (2/2 pain)  Appearance: She is not toxic-appearing  Comments: Tearful secondary to abdominal pain   HENT:      Head: Normocephalic and atraumatic  Eyes:      Extraocular Movements: Extraocular movements intact  Cardiovascular:      Rate and Rhythm: Normal rate and regular rhythm  Heart sounds: No murmur heard    Pulmonary:      Effort: Pulmonary effort is normal       Comments: Clear to auscultation bilateral, area/posterior lung fields  Abdominal:      General: There is distension  Palpations: Abdomen is soft  Tenderness: There is abdominal tenderness (generalized)  There is no guarding or rebound  Comments: Previous surgical scars noted, laparoscopic incisions and midline laparotomy scar  Active bowel sounds  Abdomen moderately distended, tympanic to percussion  Nontender to percussion  No peritoneal signs, no rigidity  Musculoskeletal:         General: No tenderness  Cervical back: Neck supple  Right lower leg: No edema  Left lower leg: No edema  Skin:     General: Skin is warm and dry  Neurological:      General: No focal deficit present  Mental Status: She is alert  Psychiatric:         Behavior: Behavior normal          Thought Content: Thought content normal            Lab Results: I have personally reviewed pertinent lab results      Recent Results (from the past 36 hour(s))   ECG 12 lead    Collection Time: 01/25/23 12:23 PM   Result Value Ref Range    Ventricular Rate 127 BPM    Atrial Rate 127 BPM    SC Interval 128 ms    QRSD Interval 66 ms    QT Interval 374 ms    QTC Interval 543 ms    P [place] 63 degrees    QRS Axis 69 degrees    T Wave Axis 71 degrees   CBC and differential    Collection Time: 01/25/23 12:51 PM   Result Value Ref Range    WBC 14 27 (H) 4 31 - 10 16 Thousand/uL    RBC 4 77 3 81 - 5 12 Million/uL    Hemoglobin 14 0 11 5 - 15 4 g/dL    Hematocrit 41 7 34 8 - 46 1 %    MCV 87 82 - 98 fL    MCH 29 4 26 8 - 34 3 pg    MCHC 33 6 31 4 - 37 4 g/dL    RDW 13 4 11 6 - 15 1 %    MPV 9 7 8 9 - 12 7 fL    Platelets 170 (H) 586 - 390 Thousands/uL    nRBC 0 /100 WBCs    Neutrophils Relative 72 43 - 75 %    Immat GRANS % 0 0 - 2 %    Lymphocytes Relative 21 14 - 44 %    Monocytes Relative 6 4 - 12 %    Eosinophils Relative 1 0 - 6 %    Basophils Relative 0 0 - 1 %    Neutrophils Absolute 10 30 (H) 1 85 - 7 62 Thousands/µL    Immature Grans Absolute 0 05 0 00 - 0 20 Thousand/uL Lymphocytes Absolute 2 92 0 60 - 4 47 Thousands/µL    Monocytes Absolute 0 84 0 17 - 1 22 Thousand/µL    Eosinophils Absolute 0 13 0 00 - 0 61 Thousand/µL    Basophils Absolute 0 03 0 00 - 0 10 Thousands/µL   Comprehensive metabolic panel    Collection Time: 01/25/23 12:51 PM   Result Value Ref Range    Sodium 137 135 - 147 mmol/L    Potassium 3 9 3 5 - 5 3 mmol/L    Chloride 99 96 - 108 mmol/L    CO2 26 21 - 32 mmol/L    ANION GAP 12 4 - 13 mmol/L    BUN 8 5 - 25 mg/dL    Creatinine 0 71 0 60 - 1 30 mg/dL    Glucose 170 (H) 65 - 140 mg/dL    Calcium 10 0 8 4 - 10 2 mg/dL    AST 26 13 - 39 U/L    ALT 51 7 - 52 U/L    Alkaline Phosphatase 116 (H) 34 - 104 U/L    Total Protein 7 5 6 4 - 8 4 g/dL    Albumin 4 4 3 5 - 5 0 g/dL    Total Bilirubin 0 36 0 20 - 1 00 mg/dL    eGFR 96 ml/min/1 73sq m   Lipase    Collection Time: 01/25/23 12:51 PM   Result Value Ref Range    Lipase 11 11 - 82 u/L   Lactic acid, plasma    Collection Time: 01/25/23 12:51 PM   Result Value Ref Range    LACTIC ACID 3 8 (HH) 0 5 - 2 0 mmol/L     Imaging: I have personally reviewed pertinent reports  XR abdomen obstruction series    Result Date: 1/25/2023  Impression: Groundglass haziness in the right lung base may represent developing parenchymal process  Follow-up recommended  Nonobstructive bowel gas pattern  Workstation performed: [id number]     CT abdomen pelvis w contrast    Result Date: 1/25/2023  Impression: No acute intra-abdominal abnormality  Workstation performed: [id number]     EKG, Pathology, and Other Studies: I have personally reviewed pertinent reports          [facility], [place]  1/25/2023

## 2023-01-26 LAB
ANION GAP SERPL CALCULATED.3IONS-SCNC: 10 MMOL/L (ref 4–13)
BUN SERPL-MCNC: 8 MG/DL (ref 5–25)
CALCIUM SERPL-MCNC: 9.2 MG/DL (ref 8.4–10.2)
CHLORIDE SERPL-SCNC: 100 MMOL/L (ref 96–108)
CO2 SERPL-SCNC: 29 MMOL/L (ref 21–32)
CREAT SERPL-MCNC: 0.68 MG/DL (ref 0.6–1.3)
ERYTHROCYTE [DISTWIDTH] IN BLOOD BY AUTOMATED COUNT: 13.3 % (ref 11.6–15.1)
ERYTHROCYTE [DISTWIDTH] IN BLOOD BY AUTOMATED COUNT: 13.4 % (ref 11.6–15.1)
GFR SERPL CREATININE-BSD FRML MDRD: 98 ML/MIN/1.73SQ M
GLUCOSE P FAST SERPL-MCNC: 152 MG/DL (ref 65–99)
GLUCOSE SERPL-MCNC: 118 MG/DL (ref 65–140)
GLUCOSE SERPL-MCNC: 120 MG/DL (ref 65–140)
GLUCOSE SERPL-MCNC: 152 MG/DL (ref 65–140)
GLUCOSE SERPL-MCNC: 155 MG/DL (ref 65–140)
GLUCOSE SERPL-MCNC: 188 MG/DL (ref 65–140)
HCT VFR BLD AUTO: 35.1 % (ref 34.8–46.1)
HCT VFR BLD AUTO: 41.8 % (ref 34.8–46.1)
HGB BLD-MCNC: 11.6 G/DL (ref 11.5–15.4)
HGB BLD-MCNC: 13.3 G/DL (ref 11.5–15.4)
LACTATE SERPL-SCNC: 1.7 MMOL/L (ref 0.5–2)
MCH RBC QN AUTO: 29.2 PG (ref 26.8–34.3)
MCH RBC QN AUTO: 29.2 PG (ref 26.8–34.3)
MCHC RBC AUTO-ENTMCNC: 31.8 G/DL (ref 31.4–37.4)
MCHC RBC AUTO-ENTMCNC: 33 G/DL (ref 31.4–37.4)
MCV RBC AUTO: 88 FL (ref 82–98)
MCV RBC AUTO: 92 FL (ref 82–98)
PLATELET # BLD AUTO: 308 THOUSANDS/UL (ref 149–390)
PLATELET # BLD AUTO: 364 THOUSANDS/UL (ref 149–390)
PMV BLD AUTO: 9.5 FL (ref 8.9–12.7)
PMV BLD AUTO: 9.8 FL (ref 8.9–12.7)
POTASSIUM SERPL-SCNC: 3.5 MMOL/L (ref 3.5–5.3)
RBC # BLD AUTO: 3.97 MILLION/UL (ref 3.81–5.12)
RBC # BLD AUTO: 4.55 MILLION/UL (ref 3.81–5.12)
SODIUM SERPL-SCNC: 139 MMOL/L (ref 135–147)
WBC # BLD AUTO: 14.64 THOUSAND/UL (ref 4.31–10.16)
WBC # BLD AUTO: 14.83 THOUSAND/UL (ref 4.31–10.16)

## 2023-01-26 RX ORDER — POLYETHYLENE GLYCOL 3350 17 G/17G
17 POWDER, FOR SOLUTION ORAL DAILY
Status: DISCONTINUED | OUTPATIENT
Start: 2023-01-26 | End: 2023-01-27 | Stop reason: HOSPADM

## 2023-01-26 RX ORDER — INSULIN GLARGINE 100 [IU]/ML
10 INJECTION, SOLUTION SUBCUTANEOUS
Status: DISCONTINUED | OUTPATIENT
Start: 2023-01-26 | End: 2023-01-27 | Stop reason: HOSPADM

## 2023-01-26 RX ORDER — HYDROCODONE BITARTRATE AND ACETAMINOPHEN 5; 325 MG/1; MG/1
1 TABLET ORAL EVERY 6 HOURS PRN
Status: DISCONTINUED | OUTPATIENT
Start: 2023-01-26 | End: 2023-01-27 | Stop reason: HOSPADM

## 2023-01-26 RX ORDER — ATORVASTATIN CALCIUM 20 MG/1
20 TABLET, FILM COATED ORAL DAILY
Status: DISCONTINUED | OUTPATIENT
Start: 2023-01-27 | End: 2023-01-27 | Stop reason: HOSPADM

## 2023-01-26 RX ORDER — DOCUSATE SODIUM 100 MG/1
100 CAPSULE, LIQUID FILLED ORAL 2 TIMES DAILY
Status: DISCONTINUED | OUTPATIENT
Start: 2023-01-26 | End: 2023-01-27 | Stop reason: HOSPADM

## 2023-01-26 RX ORDER — CLONAZEPAM 0.5 MG/1
0.5 TABLET ORAL 2 TIMES DAILY PRN
Status: DISCONTINUED | OUTPATIENT
Start: 2023-01-26 | End: 2023-01-27 | Stop reason: HOSPADM

## 2023-01-26 RX ORDER — TRAZODONE HYDROCHLORIDE 100 MG/1
200 TABLET ORAL
Status: DISCONTINUED | OUTPATIENT
Start: 2023-01-26 | End: 2023-01-27 | Stop reason: HOSPADM

## 2023-01-26 RX ORDER — MORPHINE SULFATE 4 MG/ML
3 INJECTION, SOLUTION INTRAMUSCULAR; INTRAVENOUS
Status: DISCONTINUED | OUTPATIENT
Start: 2023-01-26 | End: 2023-01-27 | Stop reason: HOSPADM

## 2023-01-26 RX ORDER — METOPROLOL TARTRATE 50 MG/1
50 TABLET, FILM COATED ORAL EVERY 12 HOURS SCHEDULED
Status: DISCONTINUED | OUTPATIENT
Start: 2023-01-26 | End: 2023-01-27 | Stop reason: HOSPADM

## 2023-01-26 RX ORDER — GABAPENTIN 400 MG/1
800 CAPSULE ORAL 3 TIMES DAILY
Status: DISCONTINUED | OUTPATIENT
Start: 2023-01-26 | End: 2023-01-27 | Stop reason: HOSPADM

## 2023-01-26 RX ORDER — ESCITALOPRAM OXALATE 10 MG/1
10 TABLET ORAL DAILY
Status: DISCONTINUED | OUTPATIENT
Start: 2023-01-27 | End: 2023-01-27 | Stop reason: HOSPADM

## 2023-01-26 RX ORDER — LORAZEPAM 2 MG/ML
0.5 INJECTION INTRAMUSCULAR EVERY 6 HOURS PRN
Status: DISCONTINUED | OUTPATIENT
Start: 2023-01-26 | End: 2023-01-26

## 2023-01-26 RX ORDER — HEPARIN SODIUM 5000 [USP'U]/ML
5000 INJECTION, SOLUTION INTRAVENOUS; SUBCUTANEOUS EVERY 8 HOURS SCHEDULED
Status: DISCONTINUED | OUTPATIENT
Start: 2023-01-26 | End: 2023-01-27 | Stop reason: HOSPADM

## 2023-01-26 RX ORDER — LEVOTHYROXINE SODIUM 0.03 MG/1
25 TABLET ORAL
Status: DISCONTINUED | OUTPATIENT
Start: 2023-01-27 | End: 2023-01-27 | Stop reason: HOSPADM

## 2023-01-26 RX ADMIN — MORPHINE SULFATE 2 MG: 2 INJECTION, SOLUTION INTRAMUSCULAR; INTRAVENOUS at 02:29

## 2023-01-26 RX ADMIN — SODIUM CHLORIDE, SODIUM LACTATE, POTASSIUM CHLORIDE, AND CALCIUM CHLORIDE 125 ML/HR: 600; 310; 30; 20 INJECTION, SOLUTION INTRAVENOUS at 08:26

## 2023-01-26 RX ADMIN — INSULIN LISPRO 1 UNITS: 100 INJECTION, SOLUTION INTRAVENOUS; SUBCUTANEOUS at 08:25

## 2023-01-26 RX ADMIN — METRONIDAZOLE 500 MG: 500 INJECTION, SOLUTION INTRAVENOUS at 08:25

## 2023-01-26 RX ADMIN — METRONIDAZOLE 500 MG: 500 INJECTION, SOLUTION INTRAVENOUS at 16:47

## 2023-01-26 RX ADMIN — MORPHINE SULFATE 3 MG: 4 INJECTION INTRAVENOUS at 21:25

## 2023-01-26 RX ADMIN — LORAZEPAM 0.5 MG: 2 INJECTION INTRAMUSCULAR; INTRAVENOUS at 09:22

## 2023-01-26 RX ADMIN — MORPHINE SULFATE 2 MG: 2 INJECTION, SOLUTION INTRAMUSCULAR; INTRAVENOUS at 05:33

## 2023-01-26 RX ADMIN — TRAZODONE HYDROCHLORIDE 200 MG: 100 TABLET ORAL at 21:24

## 2023-01-26 RX ADMIN — INSULIN LISPRO 1 UNITS: 100 INJECTION, SOLUTION INTRAVENOUS; SUBCUTANEOUS at 16:52

## 2023-01-26 RX ADMIN — METOPROLOL TARTRATE 50 MG: 50 TABLET, FILM COATED ORAL at 21:24

## 2023-01-26 RX ADMIN — GABAPENTIN 800 MG: 400 CAPSULE ORAL at 21:24

## 2023-01-26 RX ADMIN — PANTOPRAZOLE SODIUM 40 MG: 40 INJECTION, POWDER, FOR SOLUTION INTRAVENOUS at 08:25

## 2023-01-26 RX ADMIN — HEPARIN SODIUM 5000 UNITS: 5000 INJECTION INTRAVENOUS; SUBCUTANEOUS at 21:26

## 2023-01-26 RX ADMIN — MORPHINE SULFATE 3 MG: 4 INJECTION INTRAVENOUS at 14:00

## 2023-01-26 RX ADMIN — INSULIN GLARGINE 10 UNITS: 100 INJECTION, SOLUTION SUBCUTANEOUS at 21:24

## 2023-01-26 RX ADMIN — HYDROCODONE BITARTRATE AND ACETAMINOPHEN 1 TABLET: 5; 325 TABLET ORAL at 19:16

## 2023-01-26 RX ADMIN — METRONIDAZOLE 500 MG: 500 INJECTION, SOLUTION INTRAVENOUS at 00:12

## 2023-01-26 RX ADMIN — GABAPENTIN 800 MG: 400 CAPSULE ORAL at 16:46

## 2023-01-26 RX ADMIN — MORPHINE SULFATE 2 MG: 2 INJECTION, SOLUTION INTRAMUSCULAR; INTRAVENOUS at 08:25

## 2023-01-26 RX ADMIN — MORPHINE SULFATE 3 MG: 4 INJECTION INTRAVENOUS at 16:58

## 2023-01-26 RX ADMIN — LEVOFLOXACIN 750 MG: 5 INJECTION, SOLUTION INTRAVENOUS at 18:10

## 2023-01-26 RX ADMIN — SODIUM CHLORIDE, SODIUM LACTATE, POTASSIUM CHLORIDE, AND CALCIUM CHLORIDE 125 ML/HR: 600; 310; 30; 20 INJECTION, SOLUTION INTRAVENOUS at 16:51

## 2023-01-26 NOTE — PROGRESS NOTES
Progress Note - General Surgery   Farrukh Demarco 56 y o  female MRN: [id number]  Unit/Bed#: -01 Encounter: [id number]    ASSESSMENT:  57 y/o F w/ hx of multiple abdominal surgeries and hx of bowel obstructions who presented with 1 day of abdominal pain, N/V, constipation  Abdominal pain  · CTAP negative for any acute abdominal pathology   · Admitted overnight for observation, repeat labs, NGT decompression  · LA improved with IV hydration   · C/o RUQ/epigastric pain, s/p cholecystectomy, LFTs WNL POA  · Afebrile, VSS, tachycardia 2/2 pain/anxiety   · Leukocytosis still around 14, per chart review appears her baseline might be around 12/13  · Voiding well  · Passing flatus, had large liquid BMs last night   · Minimal output from NGT: <300cc red/clear frothy fluid    PMH: DM2, HTN, HLD, anxiety/depression, bipolar disorder, PTSD, substance abuse, hypothyroidism    PLAN:  · D/C NG tube  · Trial diet, start with clears  · Analgesia prn   · Serial abdominal exams  · Encourage OOB ambulation   · FU w/ GI outpatient for workup  · Last c-scope in 2014, recommended FU in 5 years, past due  Recommend outpatient FU for colonoscopy  · Start bowel regimen, continue once discharged   · D/w [facility] and attending       SUBJECTIVE:  Abdominal pain still severe, points to epigastric/right upper quadrant region  Feels bloated  Passing flatus, had hard liquid stool last night  Nausea/vomiting  Does note that her nose was bleeding when NG tube was inserted (planes red output)  OBJECTIVE:   Vitals:  Blood pressure 162/94, pulse (!) 129, temperature 99 4 °F (37 4 °C), temperature source Oral, resp  rate 20, height 5' 2" (1 575 m), weight 87 5 kg (193 lb), SpO2 90 %, not currently breastfeeding  ,Body mass index is 35 3 kg/m²      I/Os:    Intake/Output Summary (Last 24 hours) at 1/26/2023 1005  Last data filed at 1/25/2023 2300  Gross per 24 hour   Intake 1495 83 ml   Output --   Net 1495 83 ml Lines/Drains:  Invasive Devices     Peripheral Intravenous Line  Duration           Long-Dwell Peripheral IV (Midline) 01/25/23 Right Brachial <1 day          Drain  Duration           NG/OG/Enteral Tube Nasogastric 18 Fr Right nare <1 day                Physical Exam  Vitals reviewed  Constitutional:       General: She is in acute distress (2/2 pain)  Appearance: She is not toxic-appearing  Comments: Tearful   Cardiovascular:      Rate and Rhythm: Normal rate and regular rhythm  Heart sounds: No murmur heard  Pulmonary:      Effort: Pulmonary effort is normal       Breath sounds: No wheezing, rhonchi or rales  Abdominal:      General: Bowel sounds are normal  There is no distension  Palpations: Abdomen is soft  Tenderness: There is abdominal tenderness (RUQ/ epigastric region)  There is no right CVA tenderness, left CVA tenderness, guarding or rebound  Comments: Previous surgical scars noted: laparoscopic incisions and midline laparotomy scar  Active bowel sounds  No peritoneal signs, no rigidity  Neurological:      Mental Status: She is alert  Diagnostics:  I have personally reviewed pertinent lab results  XR abdomen obstruction series    Result Date: 1/25/2023  Impression: Groundglass haziness in the right lung base may represent developing parenchymal process  Follow-up recommended  Nonobstructive bowel gas pattern  Workstation performed: [id number]     CT abdomen pelvis w contrast    Result Date: 1/25/2023  Impression: No acute intra-abdominal abnormality   Workstation performed: [id number]     Recent Results (from the past 36 hour(s))   ECG 12 lead    Collection Time: 01/25/23 12:23 PM   Result Value Ref Range    Ventricular Rate 127 BPM    Atrial Rate 127 BPM    NV Interval 128 ms    QRSD Interval 66 ms    QT Interval 374 ms    QTC Interval 543 ms    P [place] 63 degrees    QRS Axis 69 degrees    T Wave Axis 71 degrees   CBC and differential    Collection Time: 01/25/23 12:51 PM   Result Value Ref Range    WBC 14 27 (H) 4 31 - 10 16 Thousand/uL    RBC 4 77 3 81 - 5 12 Million/uL    Hemoglobin 14 0 11 5 - 15 4 g/dL    Hematocrit 41 7 34 8 - 46 1 %    MCV 87 82 - 98 fL    MCH 29 4 26 8 - 34 3 pg    MCHC 33 6 31 4 - 37 4 g/dL    RDW 13 4 11 6 - 15 1 %    MPV 9 7 8 9 - 12 7 fL    Platelets 831 (H) 628 - 390 Thousands/uL    nRBC 0 /100 WBCs    Neutrophils Relative 72 43 - 75 %    Immat GRANS % 0 0 - 2 %    Lymphocytes Relative 21 14 - 44 %    Monocytes Relative 6 4 - 12 %    Eosinophils Relative 1 0 - 6 %    Basophils Relative 0 0 - 1 %    Neutrophils Absolute 10 30 (H) 1 85 - 7 62 Thousands/µL    Immature Grans Absolute 0 05 0 00 - 0 20 Thousand/uL    Lymphocytes Absolute 2 92 0 60 - 4 47 Thousands/µL    Monocytes Absolute 0 84 0 17 - 1 22 Thousand/µL    Eosinophils Absolute 0 13 0 00 - 0 61 Thousand/µL    Basophils Absolute 0 03 0 00 - 0 10 Thousands/µL   Comprehensive metabolic panel    Collection Time: 01/25/23 12:51 PM   Result Value Ref Range    Sodium 137 135 - 147 mmol/L    Potassium 3 9 3 5 - 5 3 mmol/L    Chloride 99 96 - 108 mmol/L    CO2 26 21 - 32 mmol/L    ANION GAP 12 4 - 13 mmol/L    BUN 8 5 - 25 mg/dL    Creatinine 0 71 0 60 - 1 30 mg/dL    Glucose 170 (H) 65 - 140 mg/dL    Calcium 10 0 8 4 - 10 2 mg/dL    AST 26 13 - 39 U/L    ALT 51 7 - 52 U/L    Alkaline Phosphatase 116 (H) 34 - 104 U/L    Total Protein 7 5 6 4 - 8 4 g/dL    Albumin 4 4 3 5 - 5 0 g/dL    Total Bilirubin 0 36 0 20 - 1 00 mg/dL    eGFR 96 ml/min/1 73sq m   Lipase    Collection Time: 01/25/23 12:51 PM   Result Value Ref Range    Lipase 11 11 - 82 u/L   Lactic acid, plasma    Collection Time: 01/25/23 12:51 PM   Result Value Ref Range    LACTIC ACID 3 8 (HH) 0 5 - 2 0 mmol/L   Blood culture #1    Collection Time: 01/25/23  1:14 PM    Specimen: Arm, Left; Blood   Result Value Ref Range    Blood Culture Received in Microbiology Lab  Culture in Progress      Lactic acid 2 Hours    Collection Time: 01/25/23  3:31 PM   Result Value Ref Range    LACTIC ACID 2 3 (HH) 0 5 - 2 0 mmol/L   Blood culture #2    Collection Time: 01/25/23  8:01 PM    Specimen: Blood   Result Value Ref Range    Blood Culture Received in Microbiology Lab  Culture in Progress      Fingerstick Glucose (POCT)    Collection Time: 01/25/23  9:13 PM   Result Value Ref Range    POC Glucose 102 65 - 140 mg/dl   Basic metabolic panel    Collection Time: 01/26/23  5:40 AM   Result Value Ref Range    Sodium 139 135 - 147 mmol/L    Potassium 3 5 3 5 - 5 3 mmol/L    Chloride 100 96 - 108 mmol/L    CO2 29 21 - 32 mmol/L    ANION GAP 10 4 - 13 mmol/L    BUN 8 5 - 25 mg/dL    Creatinine 0 68 0 60 - 1 30 mg/dL    Glucose 152 (H) 65 - 140 mg/dL    Glucose, Fasting 152 (H) 65 - 99 mg/dL    Calcium 9 2 8 4 - 10 2 mg/dL    eGFR 98 ml/min/1 73sq m   CBC and Platelet    Collection Time: 01/26/23  5:40 AM   Result Value Ref Range    WBC 14 64 (H) 4 31 - 10 16 Thousand/uL    RBC 4 55 3 81 - 5 12 Million/uL    Hemoglobin 13 3 11 5 - 15 4 g/dL    Hematocrit 41 8 34 8 - 46 1 %    MCV 92 82 - 98 fL    MCH 29 2 26 8 - 34 3 pg    MCHC 31 8 31 4 - 37 4 g/dL    RDW 13 4 11 6 - 15 1 %    Platelets 602 859 - 587 Thousands/uL    MPV 9 8 8 9 - 12 7 fL   Lactic acid, plasma    Collection Time: 01/26/23  5:40 AM   Result Value Ref Range    LACTIC ACID 1 7 0 5 - 2 0 mmol/L   Fingerstick Glucose (POCT)    Collection Time: 01/26/23  7:12 AM   Result Value Ref Range    POC Glucose 155 (H) 65 - 140 mg/dl       Current Medications:  Scheduled Meds:  Current Facility-Administered Medications   Medication Dose Route Frequency Provider Last Rate   • insulin lispro  1-6 Units Subcutaneous TID AC Nestora Cool     • insulin lispro  1-6 Units Subcutaneous HS Nestora Cool     • lactated ringers  125 mL/hr Intravenous Continuous Blease MD Dustin 125 mL/hr (01/26/23 0708)   • levofloxacin  750 mg Intravenous Q24H Ember Paulino Russell 750 mg (01/25/23 3655)   • LORazepam  0 5 mg Intravenous Q6H PRN Romi Morgan     • metroNIDAZOLE  500 mg Intravenous Q8H Romi Joner 500 mg (01/26/23 0825)   • morphine injection  3 mg Intravenous Q3H PRN Romi Morgan     • nicotine  1 patch Transdermal Daily Romi Morgan     • ondansetron  4 mg Intravenous Q4H PRN Romi Morgan     • pantoprazole  40 mg Intravenous Q24H [facility] Romi Morgan     • sodium phosphate-biphosphate  1 enema Rectal Daily PRN Romi Morgan       Continuous Infusions:lactated ringers, 125 mL/hr, Last Rate: 125 mL/hr (01/26/23 0826)      PRN Meds:  •  LORazepam  •  morphine injection  •  ondansetron  •  sodium phosphate-biphosphate      ARACELIS Ricci  1/26/2023

## 2023-01-26 NOTE — DISCHARGE INSTR - AVS FIRST PAGE
Please call the GI outpatient office to schedule an appointment  Recommend follow up screening colonoscopy (in chart appears your last one was in 2014 and they recommended follow up in 5 years, this is past due)        Tips to Relieve Constipation/Maintain Regular Bowel Movements:    What is "regular"?: Varies per individual, some people can have 1-3 bowel movements  per day, while others have 1-3 per week  Lifestyle Modifications:   -Hydration is important, drink plenty of fluids (water) daily  Watch your fluid intake if you have underlying heart, liver, kidney, or adrenal disease  Discuss with your primary care provider    -Exercise regularly, stay physically active   -Eat plenty of dietary fiber (fresh fruits/vegetables, whole grains, beans)  -Goal amount of fiber: 20 to 35 g/day  Adding Fiber Supplements/Laxatives:  Start with Psyllium seed (eg, Metamucil), methylcellulose (eg, Citrucel), calcium polycarbophil (eg, FiberCon), and wheat dextrin (eg, Benefiber) are all safe, over-the-counter fiber supplements that help to soften/bulken up the stool, to make bowel movements more regular and prevent straining  Limit the use of other over-the-counter laxatives, however if you have done all of the above and still have not had a bowel movement for a few days, take an over the counter laxative as needed to promote a bowel movement  Start with Brandy-lax, as this is more gentle and is safe option to take regularly if needed  If all of the above has been attempted and you are still experiencing constipation, other laxative such as stimulant laxatives can be taken on an as needed basis ex: Senncot, Dulcolax, etc  Limit use of these type of laxatives

## 2023-01-26 NOTE — PROGRESS NOTES
[address]  Progress Note Anamaria Koch 1965, 56 y o  female MRN: [id number]  Unit/Bed#: -01 Encounter: [id number]  Primary Care Provider: Rosi Umaña MD   Date and time admitted to hospital: 1/25/2023 12:11 PM    * Abdominal pain  Assessment & Plan  · Patient with multiple abdominal surgeries and history of SBO presenting with typical symptoms  · By patient's admission, constipation seems to be playing a role in this as well as she states that these types of symptoms usually improve after the patient has a large bowel movement  Symptoms have been ongoing and patient states she is has not been moving much flatus over the past day or so  · Patient would likely benefit from a robust bowel regimen in the outpatient  · Some improvement overnight, NG tube removed and diet started  Restart p o  meds  · Possible DC tomorrow if patient tolerating    Sepsis [facility])  Assessment & Plan  Meets SIRS criteria based on heart rate and white blood cell count  No obvious source of infection however cover with Levaquin and Flagyl to prevent bacterial translocation   DC when culture prelims return    Obesity, morbid ([address] )  Assessment & Plan  · BMI 35, patient would likely benefit from weight loss therapy    Primary hypertension  Assessment & Plan  Restart Lopressor, hold hydrochlorothiazide    Diabetes mellitus type 2 in obese [facility])  Assessment & Plan  Lab Results   Component Value Date    HGBA1C 6 9 (H) 11/02/2022       Recent Labs     01/25/23  2113 01/26/23  0712 01/26/23  1137 01/26/23  1602   POCGLU 102 155* 118 188*       Blood Sugar Average: Last 72 hrs:  (P) 140 75   -Restart Lantus and continue with sliding scale    Acquired hypothyroidism  Assessment & Plan  Restart home levothyroxine    Lactic acidosis  Assessment & Plan  · Lactic 3 8 in ED, resolved with IV fluids  · Continue supportive measures    Generalized anxiety disorder  Assessment & Plan  Reorder Klonopin PO    VTE Pharmacologic Prophylaxis: VTE Score: 2 Moderate Risk (Score 3-4) - Pharmacological DVT Prophylaxis Ordered: heparin  Patient Centered Rounds: I performed bedside rounds with nursing staff today  Discussions with Specialists or Other Care Team Provider: Surgery    Education and Discussions with Family / Patient: Updated  () at bedside  Time Spent for Care: 45 minutes  More than 50% of total time spent on counseling and coordination of care as described above  Current Length of Stay: 0 day(s)  Current Patient Status: Inpatient   Certification Statement: The patient will continue to require additional inpatient hospital stay due to Advancing of diet, pain control  Discharge Plan: Anticipate discharge tomorrow to home  Code Status: Level 1 - Full Code    Subjective:   Seen and examined at bedside  Patient standing up and ambulating around room  States abdominal pain is a little better and her NG tube was removed by the surgery team   She is passing flatus and did have a bowel movement overnight    Objective:     Vitals:   Temp (24hrs), Av 7 °F (37 1 °C), Min:98 2 °F (36 8 °C), Max:99 4 °F (37 4 °C)    Temp:  [98 2 °F (36 8 °C)-99 4 °F (37 4 °C)] 98 4 °F (36 9 °C)  HR:  [] 102  Resp:  [18-20] 20  BP: (149-162)/(86-94) 149/88  SpO2:  [90 %-93 %] 92 %  Body mass index is 35 3 kg/m²  Input and Output Summary (last 24 hours): Intake/Output Summary (Last 24 hours) at 2023 1723  Last data filed at 2023 2300  Gross per 24 hour   Intake 1495 83 ml   Output --   Net 1495 83 ml       Physical Exam:   Physical Exam  Vitals reviewed  Constitutional:       General: She is not in acute distress  Comments: Moderate discomfort due to abdominal pain   HENT:      Head: Normocephalic  Mouth/Throat:      Mouth: Mucous membranes are moist    Eyes:      General: No scleral icterus  Pupils: Pupils are equal, round, and reactive to light     Cardiovascular:      Rate and Rhythm: Normal rate and regular rhythm  Pulses: Normal pulses  Heart sounds: Normal heart sounds  Pulmonary:      Effort: Pulmonary effort is normal  No respiratory distress  Breath sounds: No wheezing, rhonchi or rales  Abdominal:      General: There is distension  Tenderness: There is abdominal tenderness  There is no guarding or rebound  Musculoskeletal:      Right lower leg: No edema  Left lower leg: No edema  Skin:     Capillary Refill: Capillary refill takes less than 2 seconds  Coloration: Skin is not jaundiced  Findings: No rash  Neurological:      General: No focal deficit present  Mental Status: She is alert and oriented to person, place, and time  Motor: No weakness     Psychiatric:         Mood and Affect: Mood normal          Behavior: Behavior normal           Additional Data:     Labs:  Results from last 7 days   Lab Units 01/26/23  0540 01/25/23  1251   WBC Thousand/uL 14 64* 14 27*   HEMOGLOBIN g/dL 13 3 14 0   HEMATOCRIT % 41 8 41 7   PLATELETS Thousands/uL 364 396*   NEUTROS PCT %  --  72   LYMPHS PCT %  --  21   MONOS PCT %  --  6   EOS PCT %  --  1     Results from last 7 days   Lab Units 01/26/23  0540 01/25/23  1251   SODIUM mmol/L 139 137   POTASSIUM mmol/L 3 5 3 9   CHLORIDE mmol/L 100 99   CO2 mmol/L 29 26   BUN mg/dL 8 8   CREATININE mg/dL 0 68 0 71   ANION GAP mmol/L 10 12   CALCIUM mg/dL 9 2 10 0   ALBUMIN g/dL  --  4 4   TOTAL BILIRUBIN mg/dL  --  0 36   ALK PHOS U/L  --  116*   ALT U/L  --  51   AST U/L  --  26   GLUCOSE RANDOM mg/dL 152* 170*         Results from last 7 days   Lab Units 01/26/23  1602 01/26/23  1137 01/26/23  0712 01/25/23  2113   POC GLUCOSE mg/dl 188* 118 155* 102         Results from last 7 days   Lab Units 01/26/23  0540 01/25/23  1531 01/25/23  1251   LACTIC ACID mmol/L 1 7 2 3* 3 8*       Lines/Drains:  Invasive Devices     Peripheral Intravenous Line  Duration           Long-Dwell Peripheral IV (Midline) 01/25/23 Right Brachial 1 day          Drain  Duration           NG/OG/Enteral Tube Nasogastric 18 Fr Right nare 1 day                      Imaging: Reviewed radiology reports from this admission including: abdominal/pelvic CT    Recent Cultures (last 7 days):   Results from last 7 days   Lab Units 01/25/23 2001 01/25/23  1314   BLOOD CULTURE  Received in Microbiology Lab  Culture in Progress  Received in Microbiology Lab  Culture in Progress         Last 24 Hours Medication List:   Current Facility-Administered Medications   Medication Dose Route Frequency Provider Last Rate   • [START ON 1/27/2023] atorvastatin  20 mg Oral Daily Brigid Fontan     • clonazePAM  0 5 mg Oral BID PRN [facility] Fontan     • docusate sodium  100 mg Oral BID [facility] Fontan     • [START ON 1/27/2023] escitalopram  10 mg Oral Daily Brigid Fontan     • gabapentin  800 mg Oral TID [facility] Fontan     • insulin glargine  10 Units Subcutaneous HS [facility] Fontan     • insulin lispro  1-6 Units Subcutaneous TID AC [facility] Fontan     • insulin lispro  1-6 Units Subcutaneous HS [facility] Fontan     • lactated ringers  125 mL/hr Intravenous Continuous Glenn Gage  mL/hr (01/26/23 1651)   • levofloxacin  750 mg Intravenous Q24H Laquetta Gulling Russell 750 mg (01/25/23 1747)   • [START ON 1/27/2023] levothyroxine  25 mcg Oral Early Morning [facility] Fontan     • metoprolol tartrate  50 mg Oral Q12H [address] [facility] Fontan     • metroNIDAZOLE  500 mg Intravenous Q8H Laquetta Gulling Russell 500 mg (01/26/23 1647)   • morphine injection  3 mg Intravenous Q3H PRN [facility] Fontan     • nicotine  1 patch Transdermal Daily [facility] Fontan     • ondansetron  4 mg Intravenous Q4H PRN [facility] Fontan     • pantoprazole  40 mg Intravenous Q24H [address] Brigid Fontan     • polyethylene glycol  17 g Oral Daily Brigid Fontan     • sodium phosphate-biphosphate  1 enema Rectal Daily PRN Brigid Fontan     • traZODone  200 mg Oral HS Karyn Fields          Today, Patient Was Seen By: Karyn Fields    **Please Note: This note may have been constructed using a voice recognition system  **

## 2023-01-26 NOTE — ASSESSMENT & PLAN NOTE
Meets SIRS criteria based on heart rate and white blood cell count  No obvious source of infection however cover with Levaquin and Flagyl to prevent bacterial translocation   DC when culture prelims return

## 2023-01-26 NOTE — PLAN OF CARE
Problem: Nutrition/Hydration-ADULT  Goal: Nutrient/Hydration intake appropriate for improving, restoring or maintaining nutritional needs  Description: Monitor and assess patient's nutrition/hydration status for malnutrition  Collaborate with interdisciplinary team and initiate plan and interventions as ordered  Monitor patient's weight and dietary intake as ordered or per policy  Utilize nutrition screening tool and intervene as necessary  Determine patient's food preferences and provide high-protein, high-caloric foods as appropriate  INTERVENTIONS:  - Monitor oral intake, urinary output, labs, and treatment plans  - Assess nutrition and hydration status and recommend course of action  - Evaluate amount of meals eaten  - Assist patient with eating if necessary   - Allow adequate time for meals  - Recommend/ encourage appropriate diets, oral nutritional supplements, and vitamin/mineral supplements  - Order, calculate, and assess calorie counts as needed  - Recommend, monitor, and adjust tube feedings and TPN/PPN based on assessed needs  - Assess need for intravenous fluids  - Provide specific nutrition/hydration education as appropriate  - Include patient/family/caregiver in decisions related to nutrition  Outcome: Progressing     Problem: Knowledge Deficit  Goal: Patient/family/caregiver demonstrates understanding of disease process, treatment plan, medications, and discharge instructions  Description: Complete learning assessment and assess knowledge base    Interventions:  - Provide teaching at level of understanding  - Provide teaching via preferred learning methods  Outcome: Progressing     Problem: GASTROINTESTINAL - ADULT  Goal: Minimal or absence of nausea and/or vomiting  Description: INTERVENTIONS:  - Administer IV fluids if ordered to ensure adequate hydration  - Maintain NPO status until nausea and vomiting are resolved  - Nasogastric tube if ordered  - Administer ordered antiemetic medications as needed  - Provide nonpharmacologic comfort measures as appropriate  - Advance diet as tolerated, if ordered  - Consider nutrition services referral to assist patient with adequate nutrition and appropriate food choices  Outcome: Progressing     Problem: PAIN - ADULT  Goal: Verbalizes/displays adequate comfort level or baseline comfort level  Description: Interventions:  - Encourage patient to monitor pain and request assistance  - Assess pain using appropriate pain scale  - Administer analgesics based on type and severity of pain and evaluate response  - Implement non-pharmacological measures as appropriate and evaluate response  - Consider cultural and social influences on pain and pain management  - Notify physician/advanced practitioner if interventions unsuccessful or patient reports new pain  Outcome: Not Progressing

## 2023-01-26 NOTE — ASSESSMENT & PLAN NOTE
Lab Results   Component Value Date    HGBA1C 6 9 (H) 11/02/2022       Recent Labs     01/25/23  2113 01/26/23  0712 01/26/23  1137 01/26/23  1602   POCGLU 102 155* 118 188*       Blood Sugar Average: Last 72 hrs:  (P) 140 75   -Restart Lantus and continue with sliding scale

## 2023-01-26 NOTE — PLAN OF CARE
Problem: Nutrition/Hydration-ADULT  Goal: Nutrient/Hydration intake appropriate for improving, restoring or maintaining nutritional needs  Description: Monitor and assess patient's nutrition/hydration status for malnutrition  Collaborate with interdisciplinary team and initiate plan and interventions as ordered  Monitor patient's weight and dietary intake as ordered or per policy  Utilize nutrition screening tool and intervene as necessary  Determine patient's food preferences and provide high-protein, high-caloric foods as appropriate       INTERVENTIONS:  - Monitor oral intake, urinary output, labs, and treatment plans  - Assess nutrition and hydration status and recommend course of action  - Evaluate amount of meals eaten  - Assist patient with eating if necessary   - Allow adequate time for meals  - Recommend/ encourage appropriate diets, oral nutritional supplements, and vitamin/mineral supplements  - Order, calculate, and assess calorie counts as needed  - Recommend, monitor, and adjust tube feedings and TPN/PPN based on assessed needs  - Assess need for intravenous fluids  - Provide specific nutrition/hydration education as appropriate  - Include patient/family/caregiver in decisions related to nutrition  Outcome: Progressing     Problem: PAIN - ADULT  Goal: Verbalizes/displays adequate comfort level or baseline comfort level  Description: Interventions:  - Encourage patient to monitor pain and request assistance  - Assess pain using appropriate pain scale  - Administer analgesics based on type and severity of pain and evaluate response  - Implement non-pharmacological measures as appropriate and evaluate response  - Consider cultural and social influences on pain and pain management  - Notify physician/advanced practitioner if interventions unsuccessful or patient reports new pain  Outcome: Progressing     Problem: INFECTION - ADULT  Goal: Absence or prevention of progression during hospitalization  Description: INTERVENTIONS:  - Assess and monitor for signs and symptoms of infection  - Monitor lab/diagnostic results  - Monitor all insertion sites, i e  indwelling lines, tubes, and drains  - Monitor endotracheal if appropriate and nasal secretions for changes in amount and color  - [place] appropriate cooling/warming therapies per order  - Administer medications as ordered  - Instruct and encourage patient and family to use good hand hygiene technique  - Identify and instruct in appropriate isolation precautions for identified infection/condition  Outcome: Progressing  Goal: Absence of fever/infection during neutropenic period  Description: INTERVENTIONS:  - Monitor WBC    Outcome: Progressing     Problem: SAFETY ADULT  Goal: Patient will remain free of falls  Description: INTERVENTIONS:  - Educate patient/family on patient safety including physical limitations  - Instruct patient to call for assistance with activity   - Consult OT/PT to assist with strengthening/mobility   - Keep Call bell within reach  - Keep bed low and locked with side rails adjusted as appropriate  - Keep care items and personal belongings within reach  - Initiate and maintain comfort rounds  - Make Fall Risk Sign visible to staff  - Apply yellow socks and bracelet for high fall risk patients  - Consider moving patient to room near nurses station  Outcome: Progressing  Goal: Maintain or return to baseline ADL function  Description: INTERVENTIONS:  -  Assess patient's ability to carry out ADLs; assess patient's baseline for ADL function and identify physical deficits which impact ability to perform ADLs (bathing, care of mouth/teeth, toileting, grooming, dressing, etc )  - Assess/evaluate cause of self-care deficits   - Assess range of motion  - Assess patient's mobility; develop plan if impaired  - Assess patient's need for assistive devices and provide as appropriate  - Encourage maximum independence but intervene and supervise when necessary  - Involve family in performance of ADLs  - Assess for home care needs following discharge   - Consider OT consult to assist with ADL evaluation and planning for discharge  - Provide patient education as appropriate  Outcome: Progressing  Goal: Maintains/Returns to pre admission functional level  Description: INTERVENTIONS:  - Perform BMAT or MOVE assessment daily    - Set and communicate daily mobility goal to care team and patient/family/caregiver     - Collaborate with rehabilitation services on mobility goals if consulted  - Out of bed for toileting  - Record patient progress and toleration of activity level   Outcome: Progressing

## 2023-01-26 NOTE — ASSESSMENT & PLAN NOTE
· Patient with multiple abdominal surgeries and history of SBO presenting with typical symptoms  · By patient's admission, constipation seems to be playing a role in this as well as she states that these types of symptoms usually improve after the patient has a large bowel movement  Symptoms have been ongoing and patient states she is has not been moving much flatus over the past day or so  · Patient would likely benefit from a robust bowel regimen in the outpatient  · Some improvement overnight, NG tube removed and diet started    Restart p o  meds  · Possible DC tomorrow if patient tolerating

## 2023-01-26 NOTE — UTILIZATION REVIEW
Initial Clinical Review    Admission: Date/Time/Statement: 1/25/23 AT 1442 OBSERVATION - CONVERTED TO INPATIENT 1/26/23 AT 18 2ND REFRACTORY ABDOMINAL PAIN WITH DISTENTION, NAUSEA, VOMITING + CONSTIPATION, HISTORY MULTIPLE ABDOMINAL SURGERIES + RECURRENT BOWEL OBSTRUCTIONS - REQUIRING CONTINUED INPATIENT MANAGEMENT AFTER OBSERVATION + > 2 MIDNITES - NPO, NGT, IVF, IV PEPCID, IV LEVAQUIN, IV FLAGYL, IV MS PRN        01/26/23 1308  Inpatient Admission  Once        Transfer Service: Hospitalist       Question Answer Comment   Level of Care Med Surg    Estimated length of stay More than 2 Midnights    Certification I certify that inpatient services are medically necessary for this patient for a duration of greater than two midnights  See H&P and MD Progress Notes for additional information about the patient's course of treatment  01/26/23 1307   01/25/23 1442  Place in Observation  Once        Transfer Service: Hospitalist       Question: Level of Care Answer: Med Surg    01/25/23 1442     ED Arrival Information     Expected   -    Arrival   1/25/2023 12:09    Acuity   Urgent            Means of arrival   Walk-In    Escorted by   Self    Service   Hospitalist    Admission type   Emergency            Arrival complaint   abd pain abd bloating vomitting         Chief Complaint   Patient presents with   • Abdominal Pain     Reports abdominal pain and bloating since last night  Multiple abdominal surgeries  1 episode of vomiting this morning  Denies diarrhea  LBM yesterday, small amount that was slightly hard        Initial Presentation:   55 y/o female with PMHx HTN, HLD, Seizures, DM2, Hypothyroidism, BiPolar Disorder, PTSD, Anxiety, Depression, Alcohol Abuse, multiple abdominal surgeries - including cholecystectomy, appendectomy, hysterectomy, and exploratory laparotomy lysis of adhesions history of recurrent bowel obstructions - presents to [facility] ED on 1/25/23 2nd 1 day history of worsening abdominal pain with distention + bloating, nausea and vomiting + constipation  In ED -   RR 22  /85  Exam: generalized abdominal tenderness  CT abdomen shows no acute intra-abdominal abnormality  Obstructive seies shows groundglass haziness in the right lung base may represent developing parenchymal process  Labs: WBC 14,270     K+ 3 9   Lactic acid 3 8   ED Tx: IVF, IV Pepcid, IV Zosyn, IV MS + IV Zofran  Placed in Observation 1/25/23 at 1442 2nd abdominal pain with nausea, vomiting + constipation - NPO, NGT per Gen Surgery, IVF, IV Pepcid, IV Levaquin, IV Flagyl, IV MS + IV Zofran prn, General Surgery Consult    1/25/23 GEN SURGERY:  ASSESSMENT:  55 y/o F w/ hx of multiple abdominal surgeries and hx of bowel obstructions who present with 1 day of abdominal pain, N/V, constipation  AF, , other VSS  Meets SIRS criteria with leukocytosis and tachycardia  Elevated lactic acid, 3 8  WBC 14 27  CTAP w/o any acute abdominal pathology, upon review there does appear to be mild SB distension      PLAN:   No acute surgical intervention needed at this time  Recommend conservative management/supportive care  Recommend NG tube insertion due to patient's sx N/V and abdominal distension   NGT to low intermittent suction, flush NGT Q 4 hours for patency  Serial abdominal exams  IV fluids  Analgesia/antiemetics PRN  Check AM labs, trend vitals/labs  Surgery will continue to follow    CONVERTED TO INPATIENT 1/26/23 AT 18 2ND REFRACTORY ABDOMINAL PAIN WITH DISTENTION, NAUSEA, VOMITING + CONSTIPATION, HISTORY MULTIPLE ABDOMINAL SURGERIES + RECURRENT BOWEL OBSTRUCTIONS - REQUIRING CONTINUED INPATIENT MANAGEMENT AFTER OBSERVATION + > 2 MIDNITES - NPO, NGT, IVF, IV PEPCID, IV LEVAQUIN, IV FLAGYL, IV MS PRN        1/26/23 GEN SURGERY:  Abdominal pain still severe, points to epigastric/right upper quadrant region  Feels bloated  Passing flatus, had hard liquid stool last night   + Nausea/vomiting    Reports nose was bleeding when NG tube was inserted         ASSESSMENT:  57 y/o F w/ hx of multiple abdominal surgeries and hx of bowel obstructions who presented with 1 day of abdominal pain, N/V, constipation       Abdominal pain  • CTAP negative for any acute abdominal pathology   • Admitted overnight for observation, repeat labs, NGT decompression     • LA improved with IV hydration   • C/o RUQ/epigastric pain, s/p cholecystectomy, LFTs WNL POA  • Afebrile, VSS, tachycardia 2/2 pain/anxiety   • Leukocytosis still around 14, per chart review appears her baseline might be around 12/13  • Voiding well  • Passing flatus, had large liquid BMs last night   • Minimal output from NGT: <300cc red/clear frothy fluid    1/27/23 INPATIENT DAY 2:      ED Triage Vitals   Temperature Pulse Respirations Blood Pressure SpO2   01/25/23 1216 01/25/23 1216 01/25/23 1216 01/25/23 1216 01/25/23 1216   97 9 °F (36 6 °C) (!) 128 22 158/85 97 %      Temp Source Heart Rate Source Patient Position - Orthostatic VS BP Location FiO2 (%)   01/25/23 1216 01/25/23 1216 01/25/23 1216 01/25/23 1216 --   Oral Monitor Sitting Right arm       Pain Score       01/25/23 1215       10 - Worst Possible Pain          Wt Readings from Last 1 Encounters:   01/25/23 87 5 kg (193 lb)     Additional Vital Signs:   Date/ Time Temp Pulse Resp BP MAP (mmHg) SpO2 O2 Device Patient Position - Orthostatic VS   01/26/23 0816 99 4 °F (37 4 °C) 129 Abnormal  20 162/94 -- 90 % -- Sitting   01/26/23 0030 98 2 °F (36 8 °C) 93 18 152/86 121 93 % None (Room air) Lying   01/25/23 1559 97 1 °F (36 2 °C) Abnormal  93 18 155/69 -- 95 % -- Sitting   01/25/23 1535 -- -- -- -- -- -- None (Room air) --   01/25/23 1449 -- 98 26 Abnormal  159/76 -- 98 % None (Room air) Lying   01/25/23 1216 97 9 °F (36 6 °C) 128 Abnormal  22 158/85 -- 97 % None (Room air) Sitting      01/25 0701   01/26 0700   I V  (mL/kg) 1245 8 (14 2)   IV Piggyback 250   Total Intake(mL/kg) 1495 8 (17 1)   Net +1495 8 Unmeasured Urine Occurrence    Unmeasured Stool Occurrence      Pertinent Labs/Diagnostic Test Results:   CT abdomen pelvis w contrast   No acute intra-abdominal abnormality  XR abdomen obstruction series   Groundglass haziness in the right lung base may represent developing parenchymal process  Follow-up recommended  Nonobstructive bowel gas pattern  Results from last 7 days   Lab Units 01/26/23  0540 01/25/23  1251   WBC Thousand/uL 14 64* 14 27*   HEMOGLOBIN g/dL 13 3 14 0   HEMATOCRIT % 41 8 41 7   PLATELETS Thousands/uL 364 396*   NEUTROS ABS Thousands/µL  --  10 30*     Results from last 7 days   Lab Units 01/26/23  0540 01/25/23  1251   SODIUM mmol/L 139 137   POTASSIUM mmol/L 3 5 3 9   CHLORIDE mmol/L 100 99   CO2 mmol/L 29 26   ANION GAP mmol/L 10 12   BUN mg/dL 8 8   CREATININE mg/dL 0 68 0 71   EGFR ml/min/1 73sq m 98 96   CALCIUM mg/dL 9 2 10 0     Results from last 7 days   Lab Units 01/25/23  1251   AST U/L 26   ALT U/L 51   ALK PHOS U/L 116*   TOTAL PROTEIN g/dL 7 5   ALBUMIN g/dL 4 4   TOTAL BILIRUBIN mg/dL 0 36     Results from last 7 days   Lab Units 01/26/23  1137 01/26/23  0712 01/25/23  2113   POC GLUCOSE mg/dl 118 155* 102     Results from last 7 days   Lab Units 01/26/23  0540 01/25/23  1251   GLUCOSE RANDOM mg/dL 152* 170*     Results from last 7 days   Lab Units 01/26/23  0540 01/25/23  1531 01/25/23  1251   LACTIC ACID mmol/L 1 7 2 3* 3 8*     Results from last 7 days   Lab Units 01/25/23  1251   LIPASE u/L 11     Results from last 7 days   Lab Units 01/25/23 2001 01/25/23  1314   BLOOD CULTURE  Received in Microbiology Lab  Culture in Progress  Received in Microbiology Lab  Culture in Progress       ED Treatment:   Medication Administration from 01/25/2023 1209 to 01/25/2023 1519       Date/Time Order Dose Route Action     01/25/2023 1302 EST lactated ringers infusion 125 mL/hr Intravenous New Bag     01/25/2023 1301 EST ondansetron (ZOFRAN) injection 4 mg 4 mg Intravenous Given     01/25/2023 1302 EST morphine injection 4 mg 4 mg Intravenous Given     01/25/2023 1302 EST Famotidine (PF) (PEPCID) injection 20 mg 20 mg Intravenous Given     01/25/2023 1352 EST piperacillin-tazobactam (ZOSYN) IVPB 3 375 g 3 375 g Intravenous New Bag     01/25/2023 1333 EST iohexol (OMNIPAQUE) 350 MG/ML injection (SINGLE-DOSE) 100 mL 100 mL Intravenous Given     01/25/2023 1443 EST morphine injection 4 mg 4 mg Intravenous Given     Past Medical History:   Diagnosis Date   • Addiction to drug [facility])    • Adjustment disorder    • Alcohol abuse    • Alcoholism ([facility] )    • Anxiety    • Bipolar disorder ([facility] )    • Bowel obstruction ([facility] )    • Depression    • Diabetes type 2, controlled ([place] [address] )    • Disease of thyroid gland    • Gastritis    • Head injury    • Heart palpitations    • Hyperlipidemia    • Hypertension    • Hypothyroidism    • Psychiatric illness    • PTSD (post-traumatic stress disorder)    • Seizure ([facility])    • Seizures ([facility] )    • Sleep difficulties    • Substance abuse ([facility] )    • Suicide attempt ([facility] )    • Withdrawal symptoms, drug or narcotic ([facility] )      Present on Admission:  • Generalized anxiety disorder  • Obesity, morbid ([facility] )  • Acquired hypothyroidism  • Diabetes mellitus type 2 in obese ([facility] )  • Primary hypertension    Admitting Diagnosis: Lactic acid increased [E87 20]  Abdominal pain [R10 9]  Abdominal pain, unspecified abdominal location [R10 9]    Age/Sex: 56 y o  female    Admission Orders:  VS q4hrs  Continuous Pulse Oximetry  SCD  Up + OOB as tolerated  NPO  NGT - LCS  BBG q6hrs with SSI  I+O q shift  Daily weight    Scheduled Medications:  insulin lispro, 1-6 Units, Subcutaneous, TID AC  insulin lispro, 1-6 Units, Subcutaneous, HS  IV levofloxacin, 750 mg, Intravenous, Q24H  IV metroNIDAZOLE, 500 mg, Intravenous, Q8H  nicotine, 1 patch, Transdermal, Daily  IV pantoprazole, 40 mg, Intravenous, Q24H NACHO    Continuous IV Infusions:  IVF lactated ringers, 125 mL/hr, Intravenous, Continuous    PRN Meds:  IV LORazepam (ATIVAN) injection 0 5 mg, Intravenous, Q6H PRN - 1/26 X 1  IV morphine injection, 2 mg, Intravenous, Q2H PRN - 1/25 X 3, 1/26 X 3  ondansetron, 4 mg, Intravenous, Q4H PRN  sodium phosphate-biphosphate, 1 enema, Rectal, Daily PRN - 1/25 X 1    IP CONSULT TO ACUTE CARE SURGERY    Network Utilization Review Department  ATTENTION: Please call with any questions or concerns to [phone] and carefully listen to the prompts so that you are directed to the right person  All voicemails are confidential   Fredy Hardwick all requests for admission clinical reviews, approved or denied determinations and any other requests to dedicated fax number below belonging to the campus where the patient is receiving treatment   List of dedicated fax numbers for the Facilities:  [address] DENIALS (Administrative/Medical Necessity) [phone]   [address] (Maternity/NICU/Pediatrics) [phone]   [address] [phone]   [facility] [address] [phone]   [address] [place] [address] [facility] [phone]   1559 [facility] Fannie Avila [facility] 134 815 [facility] [phone]

## 2023-01-26 NOTE — UTILIZATION REVIEW
NOTIFICATION OF OBSERVATION ADMISSION   AUTHORIZATION REQUEST   SERVICING FACILITY:   [place] [address]  [id number] [place] [address], [address] [place] [address]  Tax ID: [id number]  NPI: [id number] ATTENDING PROVIDER:  Attending Name and NPI#: Bartolome Burr [[id number]]  Address: [address] [place] [address]  Phone:  [phone]     ADMISSION INFORMATION:  Place of Service: On 24[address] [facility] Code: 22 CPT Code:   Admitting Diagnosis Code/Description:  Lactic acid increased [E87 20]  Abdominal pain [R10 9]  Abdominal pain, unspecified abdominal location [R10 9]  Observation Admission Date/Time:   Discharge Date/Time: No discharge date for patient encounter  UTILIZATION REVIEW CONTACT:  Bella Marquez Utilization   Network Utilization Review Department  Phone: [phone]  Fax: [phone]  Email: [email or web address]  Contact for approvals/pending authorizations, clinical reviews, and discharge  PHYSICIAN ADVISORY SERVICES:  Medical Necessity Denial & [id number] Review  Phone: [phone]  Fax: [phone]  Email: [email or web address]

## 2023-01-27 VITALS
HEART RATE: 74 BPM | SYSTOLIC BLOOD PRESSURE: 133 MMHG | BODY MASS INDEX: 35.51 KG/M2 | RESPIRATION RATE: 16 BRPM | TEMPERATURE: 98.6 F | HEIGHT: 62 IN | WEIGHT: 193 LBS | OXYGEN SATURATION: 93 % | DIASTOLIC BLOOD PRESSURE: 79 MMHG

## 2023-01-27 LAB
ANION GAP SERPL CALCULATED.3IONS-SCNC: 7 MMOL/L (ref 4–13)
BUN SERPL-MCNC: 5 MG/DL (ref 5–25)
CALCIUM SERPL-MCNC: 8.3 MG/DL (ref 8.4–10.2)
CHLORIDE SERPL-SCNC: 103 MMOL/L (ref 96–108)
CO2 SERPL-SCNC: 30 MMOL/L (ref 21–32)
CREAT SERPL-MCNC: 0.55 MG/DL (ref 0.6–1.3)
ERYTHROCYTE [DISTWIDTH] IN BLOOD BY AUTOMATED COUNT: 13.3 % (ref 11.6–15.1)
GFR SERPL CREATININE-BSD FRML MDRD: 105 ML/MIN/1.73SQ M
GLUCOSE SERPL-MCNC: 124 MG/DL (ref 65–140)
GLUCOSE SERPL-MCNC: 131 MG/DL (ref 65–140)
GLUCOSE SERPL-MCNC: 164 MG/DL (ref 65–140)
HCT VFR BLD AUTO: 34.5 % (ref 34.8–46.1)
HGB BLD-MCNC: 11.5 G/DL (ref 11.5–15.4)
MCH RBC QN AUTO: 29.4 PG (ref 26.8–34.3)
MCHC RBC AUTO-ENTMCNC: 33.3 G/DL (ref 31.4–37.4)
MCV RBC AUTO: 88 FL (ref 82–98)
PLATELET # BLD AUTO: 299 THOUSANDS/UL (ref 149–390)
PMV BLD AUTO: 9.5 FL (ref 8.9–12.7)
POTASSIUM SERPL-SCNC: 3.6 MMOL/L (ref 3.5–5.3)
RBC # BLD AUTO: 3.91 MILLION/UL (ref 3.81–5.12)
SODIUM SERPL-SCNC: 140 MMOL/L (ref 135–147)
WBC # BLD AUTO: 8.73 THOUSAND/UL (ref 4.31–10.16)

## 2023-01-27 RX ORDER — HYDROCODONE BITARTRATE AND ACETAMINOPHEN 5; 325 MG/1; MG/1
1 TABLET ORAL EVERY 6 HOURS PRN
Qty: 8 TABLET | Refills: 0 | Status: SHIPPED | OUTPATIENT
Start: 2023-01-27 | End: 2023-01-30

## 2023-01-27 RX ORDER — POLYETHYLENE GLYCOL 3350 17 G/17G
17 POWDER, FOR SOLUTION ORAL DAILY
Qty: 30 EACH | Refills: 0 | Status: SHIPPED | OUTPATIENT
Start: 2023-01-28

## 2023-01-27 RX ORDER — METRONIDAZOLE 500 MG/1
500 TABLET ORAL EVERY 8 HOURS SCHEDULED
Status: DISCONTINUED | OUTPATIENT
Start: 2023-01-27 | End: 2023-01-27

## 2023-01-27 RX ORDER — DOCUSATE SODIUM 100 MG/1
100 CAPSULE, LIQUID FILLED ORAL 2 TIMES DAILY
Qty: 28 CAPSULE | Refills: 0 | Status: SHIPPED | OUTPATIENT
Start: 2023-01-27 | End: 2023-02-10

## 2023-01-27 RX ADMIN — ESCITALOPRAM OXALATE 10 MG: 10 TABLET ORAL at 08:45

## 2023-01-27 RX ADMIN — LEVOTHYROXINE SODIUM 25 MCG: 25 TABLET ORAL at 06:38

## 2023-01-27 RX ADMIN — GABAPENTIN 800 MG: 400 CAPSULE ORAL at 08:46

## 2023-01-27 RX ADMIN — MORPHINE SULFATE 3 MG: 4 INJECTION INTRAVENOUS at 04:32

## 2023-01-27 RX ADMIN — DOCUSATE SODIUM 100 MG: 100 CAPSULE, LIQUID FILLED ORAL at 08:45

## 2023-01-27 RX ADMIN — METRONIDAZOLE 500 MG: 500 INJECTION, SOLUTION INTRAVENOUS at 01:08

## 2023-01-27 RX ADMIN — PANTOPRAZOLE SODIUM 40 MG: 40 INJECTION, POWDER, FOR SOLUTION INTRAVENOUS at 08:46

## 2023-01-27 RX ADMIN — POLYETHYLENE GLYCOL 3350 17 G: 17 POWDER, FOR SOLUTION ORAL at 08:44

## 2023-01-27 RX ADMIN — ATORVASTATIN CALCIUM 20 MG: 20 TABLET, FILM COATED ORAL at 08:46

## 2023-01-27 RX ADMIN — HYDROCODONE BITARTRATE AND ACETAMINOPHEN 1 TABLET: 5; 325 TABLET ORAL at 10:18

## 2023-01-27 RX ADMIN — HEPARIN SODIUM 5000 UNITS: 5000 INJECTION INTRAVENOUS; SUBCUTANEOUS at 06:38

## 2023-01-27 RX ADMIN — MORPHINE SULFATE 3 MG: 4 INJECTION INTRAVENOUS at 07:39

## 2023-01-27 RX ADMIN — SODIUM CHLORIDE, SODIUM LACTATE, POTASSIUM CHLORIDE, AND CALCIUM CHLORIDE 125 ML/HR: 600; 310; 30; 20 INJECTION, SOLUTION INTRAVENOUS at 04:34

## 2023-01-27 RX ADMIN — INSULIN LISPRO 1 UNITS: 100 INJECTION, SOLUTION INTRAVENOUS; SUBCUTANEOUS at 12:26

## 2023-01-27 RX ADMIN — METOPROLOL TARTRATE 50 MG: 50 TABLET, FILM COATED ORAL at 08:45

## 2023-01-27 NOTE — PLAN OF CARE
Problem: Nutrition/Hydration-ADULT  Goal: Nutrient/Hydration intake appropriate for improving, restoring or maintaining nutritional needs  Description: Monitor and assess patient's nutrition/hydration status for malnutrition  Collaborate with interdisciplinary team and initiate plan and interventions as ordered  Monitor patient's weight and dietary intake as ordered or per policy  Utilize nutrition screening tool and intervene as necessary  Determine patient's food preferences and provide high-protein, high-caloric foods as appropriate       INTERVENTIONS:  - Monitor oral intake, urinary output, labs, and treatment plans  - Assess nutrition and hydration status and recommend course of action  - Evaluate amount of meals eaten  - Assist patient with eating if necessary   - Allow adequate time for meals  - Recommend/ encourage appropriate diets, oral nutritional supplements, and vitamin/mineral supplements  - Order, calculate, and assess calorie counts as needed  - Recommend, monitor, and adjust tube feedings and TPN/PPN based on assessed needs  - Assess need for intravenous fluids  - Provide specific nutrition/hydration education as appropriate  - Include patient/family/caregiver in decisions related to nutrition  Outcome: Progressing     Problem: PAIN - ADULT  Goal: Verbalizes/displays adequate comfort level or baseline comfort level  Description: Interventions:  - Encourage patient to monitor pain and request assistance  - Assess pain using appropriate pain scale  - Administer analgesics based on type and severity of pain and evaluate response  - Implement non-pharmacological measures as appropriate and evaluate response  - Consider cultural and social influences on pain and pain management  - Notify physician/advanced practitioner if interventions unsuccessful or patient reports new pain  Outcome: Progressing     Problem: INFECTION - ADULT  Goal: Absence or prevention of progression during hospitalization  Description: INTERVENTIONS:  - Assess and monitor for signs and symptoms of infection  - Monitor lab/diagnostic results  - Monitor all insertion sites, i e  indwelling lines, tubes, and drains  - Monitor endotracheal if appropriate and nasal secretions for changes in amount and color  - [place] appropriate cooling/warming therapies per order  - Administer medications as ordered  - Instruct and encourage patient and family to use good hand hygiene technique  - Identify and instruct in appropriate isolation precautions for identified infection/condition  Outcome: Progressing     Problem: SAFETY ADULT  Goal: Patient will remain free of falls  Description: INTERVENTIONS:  - Educate patient/family on patient safety including physical limitations  - Instruct patient to call for assistance with activity   - Consult OT/PT to assist with strengthening/mobility   - Keep Call bell within reach  - Keep bed low and locked with side rails adjusted as appropriate  - Keep care items and personal belongings within reach  - Initiate and maintain comfort rounds  - Make Fall Risk Sign visible to staff  - Offer Toileting every 2 Hours, in advance of need  - Initiate/Maintain alarm  - Obtain necessary fall risk management equipment:   - Apply yellow socks and bracelet for high fall risk patients  - Consider moving patient to room near nurses station  Outcome: Progressing  Goal: Maintain or return to baseline ADL function  Description: INTERVENTIONS:  -  Assess patient's ability to carry out ADLs; assess patient's baseline for ADL function and identify physical deficits which impact ability to perform ADLs (bathing, care of mouth/teeth, toileting, grooming, dressing, etc )  - Assess/evaluate cause of self-care deficits   - Assess range of motion  - Assess patient's mobility; develop plan if impaired  - Assess patient's need for assistive devices and provide as appropriate  - Encourage maximum independence but intervene and supervise when necessary  - Involve family in performance of ADLs  - Assess for home care needs following discharge   - Consider OT consult to assist with ADL evaluation and planning for discharge  - Provide patient education as appropriate  Outcome: Progressing  Goal: Maintains/Returns to pre admission functional level  Description: INTERVENTIONS:  - Perform BMAT or MOVE assessment daily    - Set and communicate daily mobility goal to care team and patient/family/caregiver  - Collaborate with rehabilitation services on mobility goals if consulted  - Perform Range of Motion 4 times a day  - Reposition patient every 2 hours  - Dangle patient 3 times a day  - Stand patient 3 times a day  - Ambulate patient 3 times a day  - Out of bed to chair 3 times a day   - Out of bed for meals 3 times a day  - Out of bed for toileting  - Record patient progress and toleration of activity level   Outcome: Progressing     Problem: DISCHARGE PLANNING  Goal: Discharge to home or other facility with appropriate resources  Description: INTERVENTIONS:  - Identify barriers to discharge w/patient and caregiver  - Arrange for needed discharge resources and transportation as appropriate  - Identify discharge learning needs (meds, wound care, etc )  - Arrange for interpretive services to assist at discharge as needed  - Refer to Case Management Department for coordinating discharge planning if the patient needs post-hospital services based on physician/advanced practitioner order or complex needs related to functional status, cognitive ability, or social support system  Outcome: Progressing     Problem: Knowledge Deficit  Goal: Patient/family/caregiver demonstrates understanding of disease process, treatment plan, medications, and discharge instructions  Description: Complete learning assessment and assess knowledge base    Interventions:  - Provide teaching at level of understanding  - Provide teaching via preferred learning methods  Outcome: Progressing     Problem: GASTROINTESTINAL - ADULT  Goal: Minimal or absence of nausea and/or vomiting  Description: INTERVENTIONS:  - Administer IV fluids if ordered to ensure adequate hydration  - Maintain NPO status until nausea and vomiting are resolved  - Nasogastric tube if ordered  - Administer ordered antiemetic medications as needed  - Provide nonpharmacologic comfort measures as appropriate  - Advance diet as tolerated, if ordered  - Consider nutrition services referral to assist patient with adequate nutrition and appropriate food choices  Outcome: Progressing  Goal: Maintains or returns to baseline bowel function  Description: INTERVENTIONS:  - Assess bowel function  - Encourage oral fluids to ensure adequate hydration  - Administer IV fluids if ordered to ensure adequate hydration  - Administer ordered medications as needed  - Encourage mobilization and activity  - Consider nutritional services referral to assist patient with adequate nutrition and appropriate food choices  Outcome: Progressing

## 2023-01-27 NOTE — ASSESSMENT & PLAN NOTE
Lab Results   Component Value Date    HGBA1C 6 9 (H) 11/02/2022       Recent Labs     01/26/23  1137 01/26/23  1602 01/26/23 2051 01/27/23  0723   POCGLU 118 188* 120 124       Blood Sugar Average: Last 72 hrs:  (P) 134 5   -Restart Lantus and continue with sliding scale

## 2023-01-27 NOTE — ASSESSMENT & PLAN NOTE
· Patient with multiple abdominal surgeries and history of SBO presenting with typical symptoms  · By patient's admission, constipation seems to be playing a role in this as well as she states that these types of symptoms usually improve after the patient has a large bowel movement  Symptoms have been ongoing and patient states she is has not been moving much flatus over the past day or so  · Patient would likely benefit from a robust bowel regimen in the outpatient  · Some improvement overnight, NG tube removed and diet started    Restart p o  meds  · Possible DC today if patient tolerating

## 2023-01-27 NOTE — PLAN OF CARE
Problem: Nutrition/Hydration-ADULT  Goal: Nutrient/Hydration intake appropriate for improving, restoring or maintaining nutritional needs  Description: Monitor and assess patient's nutrition/hydration status for malnutrition  Collaborate with interdisciplinary team and initiate plan and interventions as ordered  Monitor patient's weight and dietary intake as ordered or per policy  Utilize nutrition screening tool and intervene as necessary  Determine patient's food preferences and provide high-protein, high-caloric foods as appropriate       INTERVENTIONS:  - Monitor oral intake, urinary output, labs, and treatment plans  - Assess nutrition and hydration status and recommend course of action  - Evaluate amount of meals eaten  - Assist patient with eating if necessary   - Allow adequate time for meals  - Recommend/ encourage appropriate diets, oral nutritional supplements, and vitamin/mineral supplements  - Order, calculate, and assess calorie counts as needed  - Recommend, monitor, and adjust tube feedings and TPN/PPN based on assessed needs  - Assess need for intravenous fluids  - Provide specific nutrition/hydration education as appropriate  - Include patient/family/caregiver in decisions related to nutrition  Outcome: Progressing     Problem: PAIN - ADULT  Goal: Verbalizes/displays adequate comfort level or baseline comfort level  Description: Interventions:  - Encourage patient to monitor pain and request assistance  - Assess pain using appropriate pain scale  - Administer analgesics based on type and severity of pain and evaluate response  - Implement non-pharmacological measures as appropriate and evaluate response  - Consider cultural and social influences on pain and pain management  - Notify physician/advanced practitioner if interventions unsuccessful or patient reports new pain  Outcome: Progressing     Problem: INFECTION - ADULT  Goal: Absence or prevention of progression during hospitalization  Description: INTERVENTIONS:  - Assess and monitor for signs and symptoms of infection  - Monitor lab/diagnostic results  - Monitor all insertion sites, i e  indwelling lines, tubes, and drains  - Monitor endotracheal if appropriate and nasal secretions for changes in amount and color  - [place] appropriate cooling/warming therapies per order  - Administer medications as ordered  - Instruct and encourage patient and family to use good hand hygiene technique  - Identify and instruct in appropriate isolation precautions for identified infection/condition  Outcome: Progressing     Problem: DISCHARGE PLANNING  Goal: Discharge to home or other facility with appropriate resources  Description: INTERVENTIONS:  - Identify barriers to discharge w/patient and caregiver  - Arrange for needed discharge resources and transportation as appropriate  - Identify discharge learning needs (meds, wound care, etc )  - Arrange for interpretive services to assist at discharge as needed  - Refer to Case Management Department for coordinating discharge planning if the patient needs post-hospital services based on physician/advanced practitioner order or complex needs related to functional status, cognitive ability, or social support system  Outcome: Progressing     Problem: Knowledge Deficit  Goal: Patient/family/caregiver demonstrates understanding of disease process, treatment plan, medications, and discharge instructions  Description: Complete learning assessment and assess knowledge base    Interventions:  - Provide teaching at level of understanding  - Provide teaching via preferred learning methods  Outcome: Progressing

## 2023-01-27 NOTE — PROGRESS NOTES
The levofloxacin has been converted to Oral per [facility] IV-to-PO Auto-Conversion Protocol for Adults as approved by the Pharmacy and Therapeutics Committee  The patient met all eligible criteria:  3 Age = 27years old   2) Received at least one dose of the IV form   3) Receiving at least one other scheduled oral/enteral medication   4) Tolerating an oral/enteral diet   and did not have any exclusions:   1) Critical care patient   2) Active GI bleed (IF assessing H2RAs or PPIs)   3) Continuous tube feeding (IF assessing cipro, doxycycline, levofloxacin, minocycline, rifampin, or voriconazole)   4) Receiving PO vancomycin (IF assessing metronidazole)   5) Persistent nausea and/or vomiting   6) Ileus or gastrointestinal obstruction   7) Elizabeth/nasogastric tube set for continuous suction   8) Specific order not to automatically convert to PO (in the order's comments or if discussed in the most recent Infectious Disease or primary team's progress notes)

## 2023-01-27 NOTE — PROGRESS NOTES
The metronidazole has been converted to Oral per [facility] IV-to-PO Auto-Conversion Protocol for Adults as approved by the Pharmacy and Therapeutics Committee  The patient met all eligible criteria:  3 Age = 27years old   2) Received at least one dose of the IV form   3) Receiving at least one other scheduled oral/enteral medication   4) Tolerating an oral/enteral diet   and did not have any exclusions:   1) Critical care patient   2) Active GI bleed (IF assessing H2RAs or PPIs)   3) Continuous tube feeding (IF assessing cipro, doxycycline, levofloxacin, minocycline, rifampin, or voriconazole)   4) Receiving PO vancomycin (IF assessing metronidazole)   5) Persistent nausea and/or vomiting   6) Ileus or gastrointestinal obstruction   7) Elizabeth/nasogastric tube set for continuous suction   8) Specific order not to automatically convert to PO (in the order's comments or if discussed in the most recent Infectious Disease or primary team's progress notes)

## 2023-01-27 NOTE — DISCHARGE SUMMARY
[address]  Discharge- Trista Brown 1965, 56 y o  female MRN: [id number]  Unit/Bed#: -01 Encounter: [id number]  Primary Care Provider: Sangita Cm MD   Date and time admitted to hospital: 1/25/2023 12:11 PM    * Abdominal pain  Assessment & Plan  · Patient with multiple abdominal surgeries and history of SBO presenting with typical symptoms  · By patient's admission, constipation seems to be playing a role in this as well as she states that these types of symptoms usually improve after the patient has a large bowel movement  Symptoms have been ongoing and patient states she is has not been moving much flatus over the past day or so  · Patient would likely benefit from a robust bowel regimen in the outpatient  · Some improvement overnight, NG tube removed and diet started  Restart p o  meds  · Possible DC today if patient tolerating    Sepsis ([facility] [address] )  Assessment & Plan  Meets SIRS criteria based on heart rate and white blood cell count  No obvious source of infection however cover with Levaquin and Flagyl to prevent bacterial translocation   DC when culture prelims return    Obesity, morbid ([facility] [address] )  Assessment & Plan  · BMI 35, patient would likely benefit from weight loss therapy    Primary hypertension  Assessment & Plan  Restart Lopressor, hold hydrochlorothiazide    Diabetes mellitus type 2 in obese [facility])  Assessment & Plan  Lab Results   Component Value Date    HGBA1C 6 9 (H) 11/02/2022       Recent Labs     01/26/23  1137 01/26/23  1602 01/26/23  2051 01/27/23  0723   POCGLU 118 188* 120 124       Blood Sugar Average: Last 72 hrs:  (P) 134 5   -Restart Lantus and continue with sliding scale    Acquired hypothyroidism  Assessment & Plan  Restart home levothyroxine    Lactic acidosis  Assessment & Plan  · Lactic 3 8 in ED, resolved with IV fluids  · Continue supportive measures    Generalized anxiety disorder  Assessment & Plan  Reorder Klonopin PO      Medical Problems Resolved Problems  Date Reviewed: 1/27/2023   None       Discharging Physician / Practitioner: Roseanna Sabillon  PCP: Terrence Morton MD  Admission Date:   Admission Orders (From admission, onward)     Ordered        01/26/23 1307  Inpatient Admission  Once            01/25/23 1442  Place in Observation  Once                      Discharge Date: 01/27/23    Consultations During Hospital Stay:  · General Surgery    Procedures Performed:   · None    Significant Findings / Test Results:   · Lactic Acid Level 3 8 -> 2 3 -> 1 7    Test Results Pending at Discharge (will require follow up): · None     Outpatient Tests Requested:  · None    Complications:  None    Reason for Admission: Abdominal Pain    Hospital Course:   Sabrina Quinteros is a 56 y o  female patient who originally presented to the hospital on 1/25/2023 due to abdominal pain  Akbar Alexandra has a history of constipation as well as multiple abdominal surgeries and admissions for small bowel obstructions  She presented to the [facility] ED for an episode of similar  She states that she usually has daily bowel movements using only daily coffee as a sort of bowel regimen  She had had decrease in her bowel activity despite typical intake of coffee  A lot of time she gets severe abdominal pain but it improves with a bowel movement  This time she has not had much relief even when she does move her bowels  Fortunately work-up in the ED was generally unremarkable with CAT scan showing no SBO or ileus, lactic was elevated to 3 8 initially thought to be related to perhaps some degree of bowel slowing or obstruction or dehydration  Patient was admitted and started on IV maintenance fluids with analgesics and antiemetics  General surgery was consulted and NG tube was placed  Patient did move her bowels on the first night of hospitalization and endorsed mild improvement  Eventually she was able to advance her diet and have her NG tube removed    The surgery service did not feel that the patient's condition necessitated surgery and the patient and her  were happy that that was the case  With continued medical therapy she did begin to have more of an improvement in her symptoms  Ultimately we discussed that Dominick Merino would likely benefit from a robust bowel regimen in the outpatient so she was sent home on MiraLAX and Colace  She was given a referral to the GI service for repeat colonoscopy in the outpatient setting  On the day of discharge she was ambulating well, and eating  She did not have a bowel movement but did have one during this hospitalization and she stated she felt ready to go home  At this time she was deemed stable for discharge home    Please see above list of diagnoses and related plan for additional information  Condition at Discharge: good    Discharge Day Visit / Exam:   Subjective: No acute distress  Vitals: Blood Pressure: 133/79 (01/27/23 0734)  Pulse: 74 (01/27/23 0734)  Temperature: 98 6 °F (37 °C) (01/27/23 0734)  Temp Source: Oral (01/27/23 0734)  Respirations: 16 (01/27/23 0734)  Height: 5' 2" (157 5 cm) (01/25/23 1559)  Weight - Scale: 87 5 kg (193 lb) (01/25/23 1559)  SpO2: 93 % (01/27/23 0734)  Exam:   Physical Exam  Vitals reviewed  Constitutional:       General: She is not in acute distress  Comments: Moderate discomfort due to abdominal pain   HENT:      Head: Normocephalic  Mouth/Throat:      Mouth: Mucous membranes are moist    Eyes:      General: No scleral icterus  Pupils: Pupils are equal, round, and reactive to light  Cardiovascular:      Rate and Rhythm: Normal rate and regular rhythm  Pulses: Normal pulses  Heart sounds: Normal heart sounds  Pulmonary:      Effort: Pulmonary effort is normal  No respiratory distress  Breath sounds: No wheezing, rhonchi or rales  Abdominal:      General: There is distension  Tenderness: There is abdominal tenderness   There is no guarding or rebound  Comments: Tenderness improved from yesterday   Musculoskeletal:      Right lower leg: No edema  Left lower leg: No edema  Skin:     Capillary Refill: Capillary refill takes less than 2 seconds  Coloration: Skin is not jaundiced  Findings: No rash  Neurological:      General: No focal deficit present  Mental Status: She is alert and oriented to person, place, and time  Motor: No weakness  Psychiatric:         Mood and Affect: Mood normal          Behavior: Behavior normal           Discussion with Family: Updated  () at bedside  Discharge instructions/Information to patient and family:   See after visit summary for information provided to patient and family  Provisions for Follow-Up Care:  See after visit summary for information related to follow-up care and any pertinent home health orders  Disposition:   Home    Planned Readmission: No     Discharge Statement:  I spent 45 minutes discharging the patient  This time was spent on the day of discharge  I had direct contact with the patient on the day of discharge  Greater than 50% of the total time was spent examining patient, answering all patient questions, arranging and discussing plan of care with patient as well as directly providing post-discharge instructions  Additional time then spent on discharge activities  Discharge Medications:  See after visit summary for reconciled discharge medications provided to patient and/or family        **Please Note: This note may have been constructed using a voice recognition system**

## 2023-01-27 NOTE — UTILIZATION REVIEW
Continued Stay Review  Completed on  1/27)    Current Patient Class:    ip   Current Level of Care: ms    HPI:57 y o  female initially admitted on   1/25 (obs - changed to IP  1/26)  For workup of abd pain  In setting of multiple abd surgeries/SBO & h/o constipation  Met SIRS on admit (HR, wbc) with lactic acidosis  No obvious source infection but started on Levaquin and Flagyl to prevent bacterial translocation  NG Tube initiated upon admission - IVF - trending lactic acid         1/26   CHANGED To INPATIENT        Ongoing abd pain :  passing flatus and having bm's  NG Tube removed this am - will trial clears  Lactic acid wnl   WBC  Invreased from 14 2  to 14 8  Cont IVF LR  @  125  Required   IV MS  2 mg x3 - increased dosage to 3mg  - given x3  IV ativan x1  Norco po x1       1/27   DAY 3           Pt reports 3 bm's  (in past 24 hrs)       Levaquin/flagyl converted to  PO  This am      IV MS 3 mg @ 8080 and 0740  Prn NORCO given @  1020  Recommend continuing bowel regimen, encourage ambulation, advance diet as tolerated  Follow up with GI and recommend colonoscopy as pt is overdue (last c-scope 2014 with sessile polyp)  No surgical needs at this time           Vital Signs:   01/27/23 0734 98 6 °F (37 °C) 74 16 133/79 -- 93 % -- Lying   01/26/23 2121 98 1 °F (36 7 °C) 81 19 140/79 111 97 %       Obese - bmi  35   Pertinent Labs/Diagnostic Results:   1/25    XR ABDOMEN OBSTRUCTION SERIES -  Non obstructing pattern         Results from last 7 days   Lab Units 01/26/23  1807 01/26/23  0540 01/25/23  1251   WBC Thousand/uL 14 83* 14 64* 14 27*   HEMOGLOBIN g/dL 11 6 13 3 14 0   HEMATOCRIT % 35 1 41 8 41 7   PLATELETS Thousands/uL 308 364 396*   NEUTROS ABS Thousands/µL  --   --  10 30*         Results from last 7 days   Lab Units 01/26/23  0540 01/25/23  1251   SODIUM mmol/L 139 137   POTASSIUM mmol/L 3 5 3 9   CHLORIDE mmol/L 100 99   CO2 mmol/L 29 26   ANION GAP mmol/L 10 12   BUN mg/dL 8 8   CREATININE mg/dL 0 68 0 71   EGFR ml/min/1 73sq m 98 96   CALCIUM mg/dL 9 2 10 0     Results from last 7 days   Lab Units 01/25/23  1251   AST U/L 26   ALT U/L 51   ALK PHOS U/L 116*   TOTAL PROTEIN g/dL 7 5   ALBUMIN g/dL 4 4   TOTAL BILIRUBIN mg/dL 0 36     Results from last 7 days   Lab Units 01/27/23  0723 01/26/23  2051 01/26/23  1602 01/26/23  1137 01/26/23  0712 01/25/23  2113   POC GLUCOSE mg/dl 124 120 188* 118 155* 102     Results from last 7 days   Lab Units 01/26/23  0540 01/25/23  1251   GLUCOSE RANDOM mg/dL 152* 170*       Results from last 7 days   Lab Units 01/26/23  0540 01/25/23  1531 01/25/23  1251   LACTIC ACID mmol/L 1 7 2 3* 3 8*           Results from last 7 days   Lab Units 01/26/23  1901 01/25/23  1314   BLOOD CULTURE  No Growth at 24 hrs  No Growth at 24 hrs  Medications:   Scheduled Medications:  atorvastatin, 20 mg, Oral, Daily  docusate sodium, 100 mg, Oral, BID  escitalopram, 10 mg, Oral, Daily  gabapentin, 800 mg, Oral, TID  heparin (porcine), 5,000 Units, Subcutaneous, Q8H NACHO  insulin glargine, 10 Units, Subcutaneous, HS  insulin lispro, 1-6 Units, Subcutaneous, TID AC  insulin lispro, 1-6 Units, Subcutaneous, HS  levothyroxine, 25 mcg, Oral, Early Morning  metoprolol tartrate, 50 mg, Oral, Q12H [facility]  nicotine, 1 patch, Transdermal, Daily  pantoprazole, 40 mg, Intravenous, Q24H [facility]  polyethylene glycol, 17 g, Oral, Daily  traZODone, 200 mg, Oral, HS      Continuous IV Infusions:  lactated ringers, 125 mL/hr, Intravenous, Continuous      PRN Meds:  clonazePAM, 0 5 mg, Oral, BID PRN  HYDROcodone-acetaminophen, 1 tablet, Oral, Q6H PRN       1/27 @  1020  morphine injection, 3 mg, Intravenous, Q3H PRN  ondansetron, 4 mg, Intravenous, Q4H PRN  sodium phosphate-biphosphate, 1 enema, Rectal, Daily PRN        Discharge Plan: tbd    Network Utilization Review Department  ATTENTION: Please call with any questions or concerns to [phone] and carefully listen to the prompts so that you are directed to the right person  All voicemails are confidential   Jackie Bianchi all requests for admission clinical reviews, approved or denied determinations and any other requests to dedicated fax number below belonging to the campus where the patient is receiving treatment   List of dedicated fax numbers for the Facilities:  [address] DENIALS (Administrative/Medical Necessity) [phone]   [address] (Maternity/NICU/Pediatrics) [phone]   [address] [phone]   [facility] [phone]   [address] [address] [place] [address] [phone]   [facility] Aleksandra Avila [facility] [address] [facility] [phone]

## 2023-01-27 NOTE — QUICK NOTE
Pt seen and examined  Pt awake and alert, c/o intermittent crampy abdominal pain  Abdomen soft, non-distended, with active bowel sounds and tenderness to palpation in RUQ (pt has remote hx of cholecystectomy)  She is tolerating CLD without N/V  No BM since yesterday, however passing flatus and pt feels that she needs to have a BM  Recommend continuing bowel regimen, encourage ambulation, advance diet as tolerated  Follow up with GI and recommend colonoscopy as pt is overdue (last c-scope 2014 with sessile polyp)  No surgical needs at this time       Tere MCKEON [facility]  1/27/2023

## 2023-01-29 LAB
BACTERIA BLD CULT: NORMAL
BACTERIA BLD CULT: NORMAL

## 2023-01-29 NOTE — PROGRESS NOTES
BMI Counseling: Body mass index is 35 34 kg/m²  The BMI is above normal  Nutrition recommendations include decreasing portion sizes, encouraging healthy choices of fruits and vegetables, decreasing fast food intake, consuming healthier snacks, limiting drinks that contain sugar, moderation in carbohydrate intake, increasing intake of lean protein, reducing intake of saturated and trans fat and reducing intake of cholesterol  Exercise recommendations include vigorous physical activity 75 minutes/week, exercising 3-5 times per week, obtaining a gym membership and strength training exercises  No pharmacotherapy was ordered  Rationale for BMI follow-up plan is due to patient being overweight or obese  Depression Screening and Follow-up Plan: Patient's depression screening was positive with a PHQ-2 score of 4  Their PHQ-9 score was 6  Patient assessed for underlying major depression  Brief counseling provided and recommend additional follow-up/re-evaluation next office visit  Tobacco Cessation Counseling: Tobacco cessation counseling was provided  The patient is sincerely urged to quit consumption of tobacco  She is not ready to quit tobacco  Medication options and side effects of medication discussed  Patient agreed to medication  Nicotine patch was prescribed  Lung Cancer Screening Shared Decision Making: I discussed with her that she is a candidate for lung cancer CT screening  The following Shared Decision-Making points were covered:  1  Benefits of screening were discussed, including the rates of reduction in death from lung cancer and other causes  Harms of screening were reviewed, including false positive tests, radiation exposure levels, risks of invasive procedures, risks of complications of screening, and risk of overdiagnosis    2  I counseled on the importance of adherence to annual lung cancer LDCT screening, impact of co-morbidities, and ability or willingness to undergo diagnosis and treatment  3  I counseled on the importance of maintaining abstinence as a former smoker or was counseled on the importance of smoking cessation if a current smoker    Review of Eligibility Criteria: She meets all of the criteria for Lung Cancer Screening    - She is 56 y o    - She has 20 pack year tobacco history and is a current smoker or has quit within the past 15 years  - She presents no signs or symptoms of lung cancer    After discussion, the patient decided to elect lung cancer screening  Assessment/Plan:         Problem List Items Addressed This Visit        Digestive    Gastroesophageal reflux disease without esophagitis     Patient to maintain a GERD diet  Not taking Protonix 40 mg p o  daily  Endocrine    Diabetes mellitus type 2 in obese [facility])     Currently patient is maintained on Lantus 25 units subcutaneous daily at bedtime  Patient also maintained on semaglutide 0 5 mg patient had been seen in the ER for abdominal pain  Possible side effect of medication versus constipation  Encouraged to drink fluids as well as her in her diet  Lab Results   Component Value Date    HGBA1C 6 9 (H) 11/02/2022            Hypothyroidism     Patient maintained currently on levothyroxine 25 mg p o  daily  Repeat TSH ordered for reevaluation  Relevant Orders    TSH, 3rd generation with Free T4 reflex       Respiratory    Pulmonary emphysema, unspecified emphysema type ([facility] [address] )     Patient has a history of emphysema and asked that encouraged to quit smoking  Cardiovascular and Mediastinum    Primary hypertension     BP currently 144/78  Patient currently counseled on proper diet, nutrition and exercise  Recheck BP next office visit  Goal BP low 130/70  Patient to maintain a low-sodium diet  Other    Generalized anxiety disorder (Chronic)     JENNIFER-7 scale reviewed           Relevant Medications    nicotine (NICODERM CQ) 21 mg/24 hr TD 24 hr patch    Bipolar 1 disorder, depressed [facility])     Patient noted to have bipolar disorder            Relevant Medications    clonazePAM (KlonoPIN) 0 5 mg tablet    nicotine (NICODERM CQ) 21 mg/24 hr TD 24 hr patch    Benzodiazepine dependence, continuous ([address] )     Patient noted to have history of being maintained on clonopin 0 5mg twice daily  Currently present for medication refills  Relevant Orders    MM DT_Clonazepam Definitive Test    Hyperlipemia     Pain cholesterol low-fat diet  And all reviewed  Atorvastatin 20 mg p o  daily  Tobacco abuse     Continue NicoDerm CQ patch 21 mg p o  every 24 hours  Tobacco cessation counseling provided  Vitamin D deficiency     Patient noted to have a history of vitamin D deficiency  Currently counseled on taking vitamin D segmentation  Repeat vitamin D level ordered  Vitamin D 25 hydroxy  Order: [id number]   Status: Final result      Visible to patient: No (inaccessible in [address])      Next appt: 02/07/2023 at 03:00 PM in Cardiology [facility])      0 Result Notes  Component Ref Range & Units 11/2/22 12:54 PM    Vit D, 25-Hydroxy 30 0 - 100 0 ng/mL 41 6            Narrative    This assay is a certified procedure of the CDC Vitamin D Standardization Certification Program (VDSCP)     Deficiency <20ng/ml   Insufficiency 20-30ng/ml   Sufficient  ng/ml     *Patients undergoing fluorescein dye angiography may retain small amounts of fluorescein in the body for 48-72 hours post procedure  Samples containing fluorescein can produce falsely elevated Vitamin D values  If the patient had this procedure, a specimen should be resubmitted post fluorescein clearance         Specimen Collected: 11/02/22 12:54 PM Last Resulted: 11/02/22 10:25 PM         Lab Flowsheet      Order Details      Lab and Collection Details      Routing      Result History     View Encounter Conversation           Result Care Coordination      Patient Communication     Add Comments   Not seen Back to Top           Ordered On 11/2/2022  9:51 AM    Ordering Provider Authorizing Provider Ordering User Ordering Department   MD Sarah Montaño MD Mariea Amor, MD [address] [facility]     [phone]     Result Narrative    This assay is a certified procedure of the CDC Vitamin D Standardization Certification Program (VDSCP)     Deficiency <20ng/ml   Insufficiency 20-30ng/ml   Sufficient  ng/ml     *Patients undergoing fluorescein dye angiography may retain small amounts of fluorescein in the body for 48-72 hours post procedure  Samples containing fluorescein can produce falsely elevated Vitamin D values  If the patient had this procedure, a specimen should be resubmitted post fluorescein clearance  Relevant Orders    Vitamin D 25 hydroxy    Cigarette smoker     Tobacco cessation education provided  Patient placed on nicotine patch 21 mg daily  Relevant Medications    nicotine (NICODERM CQ) 21 mg/24 hr TD 24 hr patch    Screening for blood or protein in urine - Primary     Screening protein and blood  Relevant Orders    Urinalysis with microscopic    Long-term use of high-risk medication     Long term use of klonopin for anxiety  Medication screening ordered and contract provided  Patient also on gabapentin long term and urine screening provided  Relevant Orders    MM OF_Gabapentin Definitive Test         Subjective:      Patient ID: Trung Puente is a 56 y o  female  Patient is a 56-year-old female present for follow-up evaluation of abdominal pain from the ER  Patient currently recently started on Ozempic 0 5 mg subcutaneous weekly  Patient was discharged from the ER and sent for follow-up evaluation today        The following portions of the patient's history were reviewed and updated as appropriate:   Past Medical History:  She has a past medical history of Addiction to drug ([facility] ), Adjustment disorder, Alcohol abuse, Alcoholism ([place] [address] ), Anxiety, Bipolar disorder ([place] [address] ), Bowel obstruction ([place] [address] ), Depression, Diabetes type 2, controlled ([place] [address] ), Disease of thyroid gland, Gastritis, Head injury, Heart palpitations, Hyperlipidemia, Hypertension, Hypothyroidism, Psychiatric illness, PTSD (post-traumatic stress disorder), Seizure ([place] [address] ), Seizures ([place] [address] ), Sleep difficulties, Substance abuse ([place] [address] ), Suicide attempt ([place] [address] ), and Withdrawal symptoms, drug or narcotic ([place] [address] )  ,  _______________________________________________________________________  Medical Problems:  does not have any pertinent problems on file ,  _______________________________________________________________________  Past Surgical History:   has a past surgical history that includes Bowel resection; Abdominal surgery; Hysterectomy; Knee surgery (Bilateral, 1991); Esophagogastroduodenoscopy (N/A, 05/18/2018); Appendectomy; and Cholecystectomy  ,  _______________________________________________________________________  Family History:  family history includes Bipolar disorder in her mother; Diabetes in her father ,  _______________________________________________________________________  Social History:   reports that she has been smoking cigarettes  She has a 12 50 pack-year smoking history  She has been exposed to tobacco smoke  She has never used smokeless tobacco  She reports that she does not currently use alcohol after a past usage of about 6 0 standard drinks per week  She reports that she does not use drugs  ,  _______________________________________________________________________  Allergies:  is allergic to losartan and latex     _______________________________________________________________________  Current Outpatient Medications   Medication Sig Dispense Refill   • Alcohol Swabs 70 % PADS May substitute brand based on insurance coverage  Check glucose TID   100 each 0   • atorvastatin (LIPITOR) 20 mg tablet Take 1 tablet (20 mg total) by mouth daily 90 tablet 1   • Blood Glucose Monitoring Suppl (OneTouch Verio Reflect) w/Device KIT May substitute brand based on insurance coverage  Check glucose TID  1 kit 0   • clonazePAM (KlonoPIN) 0 5 mg tablet Take 1 tablet (0 5 mg total) by mouth 2 (two) times a day as needed for seizures 60 tablet 0   • docusate sodium (COLACE) 100 mg capsule Take 1 capsule (100 mg total) by mouth 2 (two) times a day for 14 days 28 capsule 0   • ergocalciferol (VITAMIN D2) 50,000 units TAKE 1 CAPSULE BY MOUTH 1 TIME A WEEK 12 capsule 1   • escitalopram (LEXAPRO) 10 mg tablet TAKE 1 TABLET(10 MG) BY MOUTH DAILY 90 tablet 1   • gabapentin (NEURONTIN) 800 mg tablet Take 1 tablet (800 mg total) by mouth 3 (three) times a day 90 tablet 4   • hydrochlorothiazide (HYDRODIURIL) 12 5 mg tablet Take 1 tablet (12 5 mg total) by mouth daily 90 tablet 2   • HYDROcodone-acetaminophen (NORCO) 5-325 mg per tablet Take 1 tablet by mouth every 6 (six) hours as needed for pain for up to 2 days Max Daily Amount: 4 tablets 8 tablet 0   • insulin glargine (LANTUS) 100 units/mL subcutaneous injection Inject 25 Units under the skin daily at bedtime 10 mL 0   • levothyroxine 25 mcg tablet Take 1 tablet (25 mcg total) by mouth daily in the early morning 90 tablet 1   • metFORMIN (GLUCOPHAGE) 1000 MG tablet TAKE 1 TABLET(1000 MG) BY MOUTH TWICE DAILY WITH MEALS 180 tablet 1   • metoprolol tartrate (LOPRESSOR) 50 mg tablet Take 1 tablet (50 mg total) by mouth every 12 (twelve) hours 60 tablet 5   • Microlet Lancets MISC      • nicotine (NICODERM CQ) 21 mg/24 hr TD 24 hr patch Place 1 patch on the skin over 24 hours daily 28 patch 0   • OneTouch Delica Lancets 40R MISC May substitute brand based on insurance coverage  Check glucose TID   100 each 0   • polyethylene glycol (MIRALAX) 17 g packet Take 17 g by mouth daily Do not start before January 28, 2023  30 each 0   • Semaglutide,0 25 or 0 5MG/DOS, (Ozempic, 0 25 or 0 5 MG/DOSE,) 2 MG/1 5ML SOPN Inject 0 5 mg under the skin once a week 1 5 mL 2   • traZODone (DESYREL) 100 mg tablet TAKE 2 TABLETS(200 MG) BY MOUTH DAILY AT BEDTIME 60 tablet 2   • ARIPiprazole (ABILIFY) 10 mg tablet Take 1 tablet (10 mg total) by mouth daily (Patient not taking: Reported on 1/4/2023) 30 tablet 0   • benztropine (COGENTIN) 1 mg tablet Take 1 mg by mouth daily (Patient not taking: Reported on 1/4/2023)     • pantoprazole (PROTONIX) 40 mg tablet Take 1 tablet (40 mg total) by mouth daily (Patient not taking: Reported on 1/4/2023) 90 tablet 1     No current facility-administered medications for this visit      _______________________________________________________________________  Review of Systems   Constitutional: Negative for activity change, appetite change, chills, fatigue, fever and unexpected weight change  HENT: Negative for congestion, ear discharge, ear pain, nosebleeds, postnasal drip, rhinorrhea, sinus pressure, sinus pain, sneezing, sore throat and voice change  Eyes: Negative for pain, redness and visual disturbance  Respiratory: Negative for cough, chest tightness, shortness of breath and wheezing  Cardiovascular: Negative for chest pain and palpitations  Gastrointestinal: Positive for abdominal pain  Negative for abdominal distention, constipation, diarrhea, nausea and vomiting  Endocrine: Negative  Genitourinary: Negative for difficulty urinating, dysuria, flank pain, frequency, hematuria and urgency  Musculoskeletal: Negative for arthralgias and myalgias  Skin: Negative  Allergic/Immunologic: Negative  Neurological: Negative  Hematological: Negative  Psychiatric/Behavioral: The patient is nervous/anxious            Objective:  Vitals:    01/30/23 1550   BP: 144/78   BP Location: Left arm   Patient Position: Sitting   Cuff Size: Standard   Pulse: 80   Resp: 18   Temp: 97 5 °F (36 4 °C)   TempSrc: Temporal   SpO2: 96% Weight: 87 6 kg (193 lb 3 2 oz)   Height: 5' 2" (1 575 m)     Body mass index is 35 34 kg/m²  Physical Exam  Vitals and nursing note reviewed  Constitutional:       Appearance: Normal appearance  She is well-developed  She is obese  HENT:      Head: Normocephalic and atraumatic  Right Ear: Tympanic membrane, ear canal and external ear normal       Left Ear: Tympanic membrane, ear canal and external ear normal       Nose: Nose normal  No congestion or rhinorrhea  Mouth/Throat:      Mouth: Mucous membranes are moist       Pharynx: No oropharyngeal exudate or posterior oropharyngeal erythema  Eyes:      Extraocular Movements: Extraocular movements intact  Conjunctiva/sclera: Conjunctivae normal       Pupils: Pupils are equal, round, and reactive to light  Cardiovascular:      Rate and Rhythm: Normal rate and regular rhythm  Pulses: Normal pulses  Heart sounds: Normal heart sounds  No murmur heard  Pulmonary:      Effort: Pulmonary effort is normal       Breath sounds: Normal breath sounds  Abdominal:      General: Bowel sounds are normal       Palpations: Abdomen is soft  Musculoskeletal:         General: Normal range of motion  Cervical back: Normal range of motion  Skin:     General: Skin is warm  Capillary Refill: Capillary refill takes less than 2 seconds  Neurological:      General: No focal deficit present  Mental Status: She is alert and oriented to person, place, and time     Psychiatric:         Mood and Affect: Mood normal          Behavior: Behavior normal

## 2023-01-29 NOTE — PATIENT INSTRUCTIONS
Diabetes and Nutrition   WHAT YOU NEED TO KNOW:   Why are nutrition plans important? Nutrition plans help with healthy eating patterns that improve health  Nutrition plans and regular exercise help keep your blood sugar levels steady  They also help delay or prevent complications of diabetes, such as diabetic kidney disease  How do I create a nutrition plan? A dietitian will help you create a nutrition plan to meet your needs and your family's needs  The goal is for you to reach or maintain healthy weight, blood sugar, blood pressure, and lipid levels  You should meet with the dietitian at least 1 time each year  You will learn the following: How food affects your blood sugar levels    How to create healthy eating habits    How to make food choices based on your activity level, weight, and glucose levels    How your favorite foods may fit into your plan    Foods that contain carbohydrates (sugars and starches), including simple and complex carbohydrates    How to keep track of all carbohydrates    Correct portion sizes for each food    Changes you can make to your plan if you get pregnant or are breastfeeding    What are some tips to do until I meet with the dietitian? Do not skip meals  The goal is to keep your blood sugar level steady  Blood sugar levels may drop too low if you have received insulin and do not eat  Eat more high-fiber foods, such as fresh or frozen fruits and vegetables, whole-grain breads, and beans  Fiber helps control or lower blood sugar and cholesterol levels  Choose whole fruits instead of fruit juice as much as possible  Sugar may be added to juice, and fiber may be removed  Choose heart-healthy fats  Foods high in heart-healthy fats include olive oil, nuts, avocados, and fatty fish, such as salmon and tuna  Foods high in unhealthy fats include red meat, full-fat dairy products, and soft margarine   Unhealthy fats can increase your risk for heart disease, increase bad cholesterol, and lower good cholesterol  Choose complex carbohydrates  Foods with complex carbohydrates include brown rice, whole-grain breads and cereals, and cooked beans  Foods with simple carbohydrates include white bread, white rice, most cold cereals, and snack foods  Your plan will include the amount of carbohydrate to have at one time or in a day  Your blood sugar level can get too high if you eat too much carbohydrate at one time  Blood sugar levels do not spike as high or drop as quickly with complex carbohydrates as with simple carbohydrates  Choose complex carbohydrates whenever possible  Have less sodium (salt)  The risk for high blood pressure (BP) increases with high-sodium foods  Limit high-sodium foods, such as soy sauce, potato chips, and canned soup  Do not add salt to food you cook  Limit your use of table salt  Read labels to have no more than 2,300 milligrams of sodium in one day  Limit artificial sweeteners  These may be found in food or drinks, such as diet soft drinks or other low-calorie beverages  Artificial sweeteners are low in calories  They may help you lower your overall calories and carbohydrates  It is important not to have more calories from other foods to make up for the calories saved  Artificial sweeteners do not have any nutrition  Eat whole foods and drink water as much as possible  Your plan may include beverages with artificial sweeteners for a short time  These can help you transition from high-sugar beverages to water  Use the plate method for each meal   This method can help you eat the right amount of carbohydrates and keep your blood sugar levels under control  Draw an imaginary line down the middle of a 9-inch dinner plate  On one side, draw another line to divide that section in half  Your plate will have one large section and 2 small sections  Fill the largest section with non-starchy vegetables   These include broccoli, spinach, cucumbers, peppers, cauliflower, and tomatoes  Add a starch to one of the small sections  Starches include pasta, rice, whole-grain bread, tortillas, corn, potatoes, and beans  Add meat or another source of protein to the other small section  Examples include chicken or turkey without skin, fish, lean beef or pork, low-fat cheese, tofu, and eggs  Add dairy products or fruit next to your plate if your meal plan allows  Examples of dairy include skim or 1% milk and low-fat yogurt  If you do not drink milk or eat dairy products, you may be able to add another serving of starchy food instead  Have a low-calorie or calorie-free drink with your meal  Examples include water or unsweetened tea or coffee  What are the risks of alcohol? Alcohol can cause hypoglycemia (very low blood sugar level), especially if you use insulin  Alcohol can cause high blood sugar and BP levels, and weight gain if you drink too much  Women 21 years or older and men 72 years or older should limit alcohol to 1 drink a day  Men aged 24 to 59 years should limit alcohol to 2 drinks a day  A drink of alcohol is 12 ounces of beer, 5 ounces of wine, or 1½ ounces of liquor  Hypoglycemia can happen hours after you drink alcohol  Check your blood sugar level for several hours after you drink alcohol  Have a source of fast-acting carbohydrates with you in case your level goes too low  You need immediate care if you have signs or symptoms of hypoglycemia, such as sweating, confusion, or fainting  Why is it important to maintain a healthy weight? A healthy weight can help you control your diabetes  You can maintain a healthy weight with a nutrition plan and exercise  Ask your healthcare provider how much you should weigh  Ask him or her to help you create a weight loss plan if you are overweight  Together you can set weight loss and maintenance goals    Call your local emergency number [phone] in the [address]) if:   You have any of the following signs of a heart attack:      Squeezing, pressure, or pain in your chest    You may  also have any of the following:     Discomfort or pain in your back, neck, jaw, stomach, or arm    Shortness of breath    Nausea or vomiting    Lightheadedness or a sudden cold sweat      When should I seek immediate care? You have a low blood sugar level and it does not improve with treatment  Symptoms are trouble thinking, a pounding heartbeat, and sweating  Your blood sugar level is above 240 mg/dL and does not come down within 15 minutes of treatment  You have ketones in your blood or urine  You have nausea or are vomiting and cannot keep any food or liquid down  You have blurred or double vision  Your breath has a fruity, sweet smell, or your breathing is shallow  When should I call my doctor or diabetes care team?   Your blood sugar levels are higher than your target goals  You often have low blood sugar levels  You have trouble coping with diabetes, or you feel anxious or depressed  You have questions or concerns about your condition or care  CARE AGREEMENT:   You have the right to help plan your care  Learn about your health condition and how it may be treated  Discuss treatment options with your healthcare providers to decide what care you want to receive  You always have the right to refuse treatment  The above information is an  only  It is not intended as medical advice for individual conditions or treatments  Talk to your doctor, nurse or pharmacist before following any medical regimen to see if it is safe and effective for you  © Copyright [employer] 2022 Information is for End User's use only and may not be sold, redistributed or otherwise used for commercial purposes   All illustrations and images included in CareNotes® are the copyrighted property of [employer]  or [employer]  Diabetes and Exercise   WHAT [place]:   How will exercise help me manage diabetes? Physical activity, such as exercise, can help keep your blood sugar level steady or improve insulin resistance  Activity can help decrease your risk for heart disease, and help you lose weight  Exercise can also help lower your A1c  Your diabetes care team will help you create an exercise plan  The plan will be based on the type of diabetes you have and your starting fitness level  What are some tips to help me create and meet my exercise goals? Set a goal for 150 minutes (2 5 hours) of moderate to vigorous aerobic activity each week  Aerobic activity helps your heart stay strong  Aerobic activity includes walking, bicycling, dancing, swimming, and raking leaves  Spread aerobic activity over 3 to 5 days  Do not take more than 2 days off in a row  It is best to do at least 10 minutes at a time and 30 minutes each day  You can work up to these goals  Remember that any activity is better than no activity  Over time, you can make exercise more intense or last longer  You can also add more days of exercise as your fitness level improves  Your diabetes care team can help you make a step-by-step plan to achieve your goals  Set a strength training goal of 2 to 3 times a week  Take at least 1 day off in between strength training sessions  Strength training helps you keep the muscles you have and build new muscles  Strength training includes lifting weights, climbing stairs, yoga, and alan chi          Older adults should include balance training 2 to 3 times each week  These include walking backwards, standing on one foot, and walking heel to toe in a straight line  What are some other healthy exercise tips? Stretch before and after you exercise to prevent injury  Drink water or liquids that do not contain sugar before, during, and after exercise  Ask your dietitian or healthcare provider which liquids you should drink when you exercise      Do not sit for longer than 30 minutes at a time during your day  If you cannot walk around, at least stand up  This will help you stay active and keep your blood circulating  What do I need to know about exercise and my blood sugar levels? Check your blood sugar level before and after exercise, if you use insulin  Healthcare providers may tell you to change the amount of insulin you take or food you eat  If your blood sugar level is high, check your blood or urine for ketones before you exercise  Do not exercise if your blood sugar level is high and you have ketones  If your blood sugar level is less than 100 mg/dL, have a carbohydrate snack before you exercise  Examples are 4 to 6 crackers, ½ banana, 8 ounces (1 cup) of milk, or 4 ounces (½ cup) of juice  Call your local emergency number ([phone] in the [address]) if:   You have chest pain or shortness of breath  When should I seek immediate care? You have a low blood sugar level and it does not improve with treatment  Symptoms are trouble thinking, a pounding heartbeat, and sweating  Your blood sugar level is above 240 mg/dL and does not come down within 15 minutes of treatment  You have blurred or double vision  Your breath has a fruity, sweet smell, or your breathing is shallow  When should I call my doctor or diabetes care team?   You have ketones in your blood or urine  You have a fever  Your blood sugar levels are higher than your target goals  You often have low blood sugar levels  Your skin is red, dry, warm, or swollen  You have a wound that does not heal     You have trouble coping with diabetes, or you feel anxious or depressed  You have questions or concerns about your condition or care  CARE AGREEMENT:   You have the right to help plan your care  Learn about your health condition and how it may be treated  Discuss treatment options with your healthcare providers to decide what care you want to receive  You always have the right to refuse treatment  The above information is an  only  It is not intended as medical advice for individual conditions or treatments  Talk to your doctor, nurse or pharmacist before following any medical regimen to see if it is safe and effective for you  © Copyright [employer] 2022 Information is for End User's use only and may not be sold, redistributed or otherwise used for commercial purposes  All illustrations and images included in CareNotes® are the copyrighted property of [employer] M , Inc  or [employer]  Hypertension   WHAT YOU NEED TO KNOW:   What is hypertension? Hypertension is high blood pressure  Your blood pressure is the force of your blood moving against the walls of your arteries  Hypertension causes your blood pressure to get so high that your heart has to work much harder than normal  This can damage your heart  The cause of hypertension may not be known  This is called essential or primary hypertension  Hypertension caused by another medical condition, such as kidney disease, is called secondary hypertension  What do I need to know about the stages of hypertension? Normal blood pressure is 119/79 or lower   Your healthcare provider may only check your blood pressure each year if it stays at a normal level  Elevated blood pressure is 120/79 to 129/79   This is sometimes called prehypertension  Your healthcare provider may suggest lifestyle changes to help lower your blood pressure to a normal level  He or she may then check it again in 3 to 6 months  Stage 1 hypertension is 130/80  to 139/89   Your provider may recommend lifestyle changes, medication, and checks every 3 to 6 months until your blood pressure is controlled  Stage 2 hypertension is 140/90 or higher   Your provider will recommend lifestyle changes and have you take 2 kinds of hypertension medicines  You will also need to have your blood pressure checked monthly until it is controlled      What increases my risk for hypertension? Age older than 54 years (men) or 72 (women)    Stress, or a family history of hypertension or heart disease    Obesity, lack of exercise, or too many high-sodium foods    Use of tobacco, alcohol, or illegal drugs    A medical condition, such as diabetes, kidney disease, thyroid disease, or adrenal gland disorder    Certain medicines, such as steroids or birth control pills    What are the signs and symptoms of hypertension? You may have no signs or symptoms, or you may have any of the following:  Headache    Blurred vision    Chest pain    Dizziness or weakness    Trouble breathing    Nosebleeds    How is hypertension diagnosed? Your healthcare provider will take your blood pressure at several visits  You may also need to check your blood pressure at home  The provider will examine you and ask what medicines you take  He or she will also ask if you have a family history of high blood pressure and about any health conditions you have  He or she will also check your blood pressure and weight and examine your heart, lungs, and eyes  You may need any of the following tests: An ambulatory blood pressure monitor (ABPM)  is a device that you wear  ABPM measures your blood pressure while you do your regular daily activities  It records your blood pressure every 15 to 30 minutes during the day  It also records your blood pressure every 15 minutes to 1 hour at night  The recorded blood pressures help your healthcare provider know if you have hypertension not seen at your appointment  Blood tests  may help healthcare providers find the cause of your hypertension  Blood tests can also help find other health problems caused by hypertension  Urine tests  will be done to check your kidney function  Kidney problems can increase your risk for hypertension  Which medicines are used to treat hypertension? Antihypertensives  may be used to help lower your blood pressure   Several kinds of medicines are available  Your healthcare provider will choose medicines based on the kind of hypertension you have  You may need more than one type of medicine  Take the medicine exactly as directed  Diuretics  help decrease extra fluid that collects in your body  This will help lower your blood pressure  You may urinate more often while you take this medicine  Cholesterol medicine  helps lower your cholesterol level  A low cholesterol level helps prevent heart disease and makes it easier to control your blood pressure  What can I do to manage hypertension? Check your blood pressure at home  Avoid smoking, caffeine, and exercise at least 30 minutes before checking your blood pressure  Sit and rest for 5 minutes before you take your blood pressure  Extend your arm and support it on a flat surface  Your arm should be at the same level as your heart  Follow the directions that came with your blood pressure monitor  Check your blood pressure 2 times, 1 minute apart, before you take your medicine in the morning  Also check your blood pressure before your evening meal  Keep a record of your readings and bring it to your follow-up visits  Ask your healthcare provider what your blood pressure should be  Manage any other health conditions you have  Health conditions such as diabetes can increase your risk for hypertension  Follow your healthcare provider's instructions and take all your medicines as directed  Ask about all medicines  Certain medicines can increase your blood pressure  Examples include oral birth control pills, decongestants, herbal supplements, and NSAIDs, such as ibuprofen  Your healthcare provider can tell you which medicines are safe for you to take  This includes prescription and over-the-counter medicines  What lifestyle changes can I make to manage hypertension? Your healthcare provider may recommend you work with a team to manage hypertension   The team may include medical experts such as a dietitian, an exercise or physical therapist, and a behavior therapist  Your family members may be included in helping you create lifestyle changes  Limit sodium (salt) as directed  Too much sodium can affect your fluid balance  Check labels to find low-sodium or no-salt-added foods  Some low-sodium foods use potassium salts for flavor  Too much potassium can also cause health problems  Your healthcare provider will tell you how much sodium and potassium are safe for you to have in a day  He or she may recommend that you limit sodium to 2,300 mg a day  Follow the meal plan recommended by your healthcare provider  A dietitian or your provider can give you more information on low-sodium plans or the DASH (Dietary Approaches to Stop Hypertension) eating plan  The DASH plan is low in sodium, processed sugar, unhealthy fats, and total fat  It is high in potassium, calcium, and fiber  These can be found in vegetables, fruit, and whole-grain foods  Be physically active throughout the day  Physical activity, such as exercise, can help control your blood pressure and your weight  Be physically active for at least 30 minutes per day, on most days of the week  Include aerobic activity, such as walking or riding a bicycle  Also include strength training at least 2 times each week  Your healthcare providers can help you create a physical activity plan  Decrease stress  This may help lower your blood pressure  Learn ways to relax, such as deep breathing or listening to music  Limit alcohol as directed  Alcohol can increase your blood pressure  A drink of alcohol is 12 ounces of beer, 5 ounces of wine, or 1½ ounces of liquor  Do not smoke  Nicotine and other chemicals in cigarettes and cigars can increase your blood pressure and also cause lung damage  Ask your healthcare provider for information if you currently smoke and need help to quit   E-cigarettes or smokeless tobacco still contain nicotine  Talk to your healthcare provider before you use these products  Call your local emergency number [phone] in the [address]) or have someone call if:   You have chest pain  You have any of the following signs of a heart attack:      Squeezing, pressure, or pain in your chest    You may  also have any of the following:     Discomfort or pain in your back, neck, jaw, stomach, or arm    Shortness of breath    Nausea or vomiting    Lightheadedness or a sudden cold sweat    You become confused or have trouble speaking  You suddenly feel lightheaded or have trouble breathing  When should I seek immediate care? You have a severe headache or vision loss  You have weakness in an arm or leg  When should I call my doctor? You feel faint, dizzy, confused, or drowsy  You have been taking your blood pressure medicine but your pressure is higher than your provider says it should be  You have questions or concerns about your condition or care  CARE AGREEMENT:   You have the right to help plan your care  Learn about your health condition and how it may be treated  Discuss treatment options with your healthcare providers to decide what care you want to receive  You always have the right to refuse treatment  The above information is an  only  It is not intended as medical advice for individual conditions or treatments  Talk to your doctor, nurse or pharmacist before following any medical regimen to see if it is safe and effective for you  © Copyright [employer] 2022 Information is for End User's use only and may not be sold, redistributed or otherwise used for commercial purposes  All illustrations and images included in CareNotes® are the copyrighted property of [employer] Inc  or [address]  Clonazepam (By mouth)   Clonazepam ([id number])  Treats seizures and panic disorder  Brand Name(s): KlonoPIN   There may be other brand names for this medicine    When This Medicine Should Not Be Used: This medicine is not right for everyone  Do not use it if you had an allergic reaction to clonazepam or similar medicines, or if you are pregnant or breastfeeding  How to Use This Medicine:   Tablet, Dissolving Tablet  Take your medicine as directed  Your dose may need to be changed several times to find what works best for you  Disintegrating tablet: Dry your hands before you handle the tablet  Place the tablet on your tongue and let it dissolve  Tablet: Swallow whole with water  This medicine should come with a Medication Guide  Ask your pharmacist for a copy if you do not have one  Missed dose: Take a dose as soon as you remember  If it is almost time for your next dose, wait until then and take a regular dose  Do not take extra medicine to make up for a missed dose  Store the medicine in a closed container at room temperature, away from heat, moisture, and direct light  Drugs and Foods to Avoid:   Ask your doctor or pharmacist before using any other medicine, including over-the-counter medicines, vitamins, and herbal products  Some medicines can affect how clonazepam works  Tell your doctor if you are using any of the following:   Carbamazepine, phenobarbital, phenytoin  Medicine to treat depression or mental health problems, including MAO inhibitors  Medicine to treat fungal infections  Phenothiazine medicine  Do not drink alcohol while you are using this medicine  Tell your doctor if you use anything else that makes you sleepy  Some examples are allergy medicine, narcotic pain medicine, and alcohol  Warnings While Using This Medicine: It is not safe to take this medicine during pregnancy  It could harm an unborn baby  Tell your doctor right away if you become pregnant  Tell your doctor if you have kidney disease, liver disease, glaucoma, lung disease or breathing problems, porphyria, or a history of drug or alcohol abuse, depression, or mental health problems    This medicine may cause the following problems:  High risk of overdose, which can lead to death  Respiratory depression (serious breathing problem that can be life-threatening)  Unusual thoughts or behavior  This medicine can be habit-forming  Do not use more than your prescribed dose  Call your doctor if you think your medicine is not working  Do not stop using this medicine suddenly  Your doctor will need to slowly decrease your dose before you stop it completely  This medicine may make you dizzy or drowsy  Do not drive or do anything else that could be dangerous until you know how this medicine affects you  Your doctor will do lab tests at regular visits to check on the effects of this medicine  Keep all appointments  Keep all medicine out of the reach of children  Never share your medicine with anyone  Possible Side Effects While Using This Medicine:   Call your doctor right away if you notice any of these side effects: Allergic reaction: Itching or hives, swelling in your face or hands, swelling or tingling in your mouth or throat, chest tightness, trouble breathing  Blue lips, fingernails, or skin  Change in how much or how often you urinate  Change or loss of consciousness, sleepiness, unusual drowsiness  Confusion, memory problems  Depression, unusual moods or behavior, thoughts of hurting yourself  Fast or slow, or shallow breathing, trouble breathing  Tiredness, or weakness, slow heartbeat, problems with coordination or walking  Worsening seizures  If you notice these less serious side effects, talk with your doctor:   Cough, runny or stuffy nose, sore throat  Increased saliva  If you notice other side effects that you think are caused by this medicine, tell your doctor  Call your doctor for medical advice about side effects   You may report side effects to FDA at 1-558-HCO-6740    © Copyright [employer] 2022 Information is for End User's use only and may not be sold, redistributed or otherwise used for commercial purposes  The above information is an  only  It is not intended as medical advice for individual conditions or treatments  Talk to your doctor, nurse or pharmacist before following any medical regimen to see if it is safe and effective for you  Hypothyroidism   WHAT YOU NEED TO KNOW:   What is hypothyroidism? Hypothyroidism is a condition that develops when the thyroid gland does not make enough thyroid hormone  Thyroid hormones help control body temperature, heart rate, growth, and weight  What causes or increases my risk for hypothyroidism? A family history of hypothyroidism    Age 61 years or older or being female    Autoimmune disease, such as inflammation of your thyroid, or Hashimoto disease    Thyroid surgery, head or neck radiation therapy, or medicines such as lithium, sedatives, or narcotics    Thyroid cancer, a goiter, or a viral infection    Low iodine level    Down syndrome or Dorsey syndrome    What are the signs and symptoms of hypothyroidism? The signs and symptoms may develop slowly, sometimes over several years  Exhaustion, depression, or irritability    Sensitivity to cold    Muscle aches, headaches, weakness, or trouble concentrating    Dry, flaky skin, brittle nails, or thinning hair    Recent weight gain without overeating, or constipation    Heavy or irregular monthly periods    Puffiness around your eyes or swelling in your hands and feet    Low heart rate, changes in your blood pressure    How is hypothyroidism diagnosed? Your healthcare provider will ask about your symptoms and what medicines you take  He or she will ask about your medical history and if anyone in your family has hypothyroidism  A blood test will show your thyroid hormone level  How is hypothyroidism treated?   Thyroid hormone replacement medicine may bring your thyroid hormone level back to normal  You will need to take this medicine for the rest of your life to control hypothyroidism  It is important to take the medicine every day as directed  You will be given instructions for what to do if you miss a dose  Ask your healthcare provider for more information on other medicines you may need  How can I manage hypothyroidism? Get more iodine  The thyroid gland uses iodine to work correctly and to make thyroid hormones  Your healthcare provider may tell you to eat foods that are rich in iodine  He or she will tell you how much of these foods to eat  Milk and seafood are good sources of iodine  You may also need iodine supplements  Keep track of your blood pressure and weight:      Check your blood pressure  and write it down as often as directed  It is important to measure your blood pressure on the same arm and in the same position each time  Keep track of your blood pressure readings, along with the date and time you took them  Take this record with you to your followup visits  Weigh yourself daily  before breakfast after you urinate  Weight gain may be a sign of extra fluid in your body  Keep track of your daily weights and take the record with you to your followup visits  Call your local emergency number [phone] in the [address] [facility]) or have someone call if:   You have sudden chest pain or shortness of breath  You have a seizure  You feel like you are going to faint  When should I seek immediate care? You have diarrhea, tremors, or trouble sleeping  Your legs, ankles, or feet are swollen  When should I call my doctor? You have a fever  You have chills, a cough, or feel weak and achy  You have pain and swelling in your muscles and joints  Your skin is itchy, swollen, or you have a rash  Your signs and symptoms return or get worse, even after treatment  You have questions or concerns about your condition or care  CARE AGREEMENT:   You have the right to help plan your care  Learn about your health condition and how it may be treated   Discuss treatment options with your healthcare providers to decide what care you want to receive  You always have the right to refuse treatment  The above information is an  only  It is not intended as medical advice for individual conditions or treatments  Talk to your doctor, nurse or pharmacist before following any medical regimen to see if it is safe and effective for you  © Copyright [employer] 2022 Information is for End User's use only and may not be sold, redistributed or otherwise used for commercial purposes  All illustrations and images included in CareNotes® are the copyrighted property of A D A M [employer]  or [employer]  Hypothyroidism   WHAT YOU NEED TO KNOW:   What is hypothyroidism? Hypothyroidism is a condition that develops when the thyroid gland does not make enough thyroid hormone  Thyroid hormones help control body temperature, heart rate, growth, and weight  What causes or increases my risk for hypothyroidism? A family history of hypothyroidism    Age 61 years or older or being female    Autoimmune disease, such as inflammation of your thyroid, or Hashimoto disease    Thyroid surgery, head or neck radiation therapy, or medicines such as lithium, sedatives, or narcotics    Thyroid cancer, a goiter, or a viral infection    Low iodine level    Down syndrome or Dorsey syndrome    What are the signs and symptoms of hypothyroidism? The signs and symptoms may develop slowly, sometimes over several years  Exhaustion, depression, or irritability    Sensitivity to cold    Muscle aches, headaches, weakness, or trouble concentrating    Dry, flaky skin, brittle nails, or thinning hair    Recent weight gain without overeating, or constipation    Heavy or irregular monthly periods    Puffiness around your eyes or swelling in your hands and feet    Low heart rate, changes in your blood pressure    How is hypothyroidism diagnosed?   Your healthcare provider will ask about your symptoms and what medicines you take  He or she will ask about your medical history and if anyone in your family has hypothyroidism  A blood test will show your thyroid hormone level  How is hypothyroidism treated? Thyroid hormone replacement medicine may bring your thyroid hormone level back to normal  You will need to take this medicine for the rest of your life to control hypothyroidism  It is important to take the medicine every day as directed  You will be given instructions for what to do if you miss a dose  Ask your healthcare provider for more information on other medicines you may need  How can I manage hypothyroidism? Get more iodine  The thyroid gland uses iodine to work correctly and to make thyroid hormones  Your healthcare provider may tell you to eat foods that are rich in iodine  He or she will tell you how much of these foods to eat  Milk and seafood are good sources of iodine  You may also need iodine supplements  Keep track of your blood pressure and weight:      Check your blood pressure  and write it down as often as directed  It is important to measure your blood pressure on the same arm and in the same position each time  Keep track of your blood pressure readings, along with the date and time you took them  Take this record with you to your followup visits  Weigh yourself daily  before breakfast after you urinate  Weight gain may be a sign of extra fluid in your body  Keep track of your daily weights and take the record with you to your followup visits  Call your local emergency number [phone] in the [address] [facility]) or have someone call if:   You have sudden chest pain or shortness of breath  You have a seizure  You feel like you are going to faint  When should I seek immediate care? You have diarrhea, tremors, or trouble sleeping  Your legs, ankles, or feet are swollen  When should I call my doctor? You have a fever      You have chills, a cough, or feel weak and achy     You have pain and swelling in your muscles and joints  Your skin is itchy, swollen, or you have a rash  Your signs and symptoms return or get worse, even after treatment  You have questions or concerns about your condition or care  CARE AGREEMENT:   You have the right to help plan your care  Learn about your health condition and how it may be treated  Discuss treatment options with your healthcare providers to decide what care you want to receive  You always have the right to refuse treatment  The above information is an  only  It is not intended as medical advice for individual conditions or treatments  Talk to your doctor, nurse or pharmacist before following any medical regimen to see if it is safe and effective for you  © Copyright [employer] 2022 Information is for End User's use only and may not be sold, redistributed or otherwise used for commercial purposes  All illustrations and images included in CareNotes® are the copyrighted property of A D A M , Inc  or [facility] Finn Alston   GERD (Gastroesophageal Reflux Disease)   WHAT YOU NEED TO KNOW:   What is gastroesophageal reflux disease (GERD)? GERD is reflux that happens more than 2 times a week for a few weeks  Reflux means acid and food in your stomach back up into your esophagus  GERD can cause other health problems over time if it is not treated  What causes GERD? GERD often happens because the lower muscle (sphincter) of the esophagus does not close properly  The sphincter normally opens to let food into the stomach  It then closes to keep food and stomach acid in the stomach  If the sphincter does not close properly, stomach acid and food back up (reflux) into the esophagus   The following may increase your risk for GERD:  Certain foods such as spicy foods, chocolate, foods that contain caffeine, peppermint, and fried foods    Hiatal hernia    Certain medicines such as calcium channel blockers (used to treat high blood pressure), allergy medicines, sedatives, or antidepressants    Pregnancy, obesity, or scleroderma    Lying down after a meal    Drinking alcohol or smoking cigarettes    What are the signs and symptoms of GERD? Heartburn (burning pain in your chest)    Pain after meals that spreads to your neck, jaw, or shoulder    Pain that gets better when you change positions    Bitter or acid taste in your mouth    A dry cough    Trouble swallowing or pain with swallowing    Hoarseness or a sore throat    Burping or hiccups    Feeling full soon after you start eating    How is GERD diagnosed? Your healthcare provider will ask about your symptoms and when they started  Tell him or her about other medical conditions you have, your eating habits, and your activities  You may also need any of the following: The amount of acid  in your esophagus and stomach may be checked  A small probe is used to check the amount  An endoscopy  is a procedure used to look at the inside of your esophagus and stomach  An endoscope is a bendable tube with a light and camera on the end  Your healthcare provider may remove a small sample of tissue and send it to a lab for tests  X-ray  pictures may be taken of your stomach and intestines (bowel)  You may be given a chalky liquid to drink before the pictures are taken  This liquid helps your stomach and intestines show up better on the x-rays  Pressure and function  tests of your esophagus can help find problems such as a hiatal hernia  How is GERD treated? Medicines  are used to decrease stomach acid  Medicine may also be used to help your lower esophageal sphincter and stomach contract (tighten) more  Surgery  is done to wrap the upper part of the stomach around the esophageal sphincter  This will strengthen the sphincter and prevent reflux  How can I manage GERD? Do not have foods or drinks that may increase heartburn    These include chocolate, peppermint, fried or fatty foods, drinks that contain caffeine, or carbonated drinks (soda)  Other foods include spicy foods, onions, tomatoes, and tomato-based foods  Do not have foods or drinks that can irritate your esophagus, such as citrus fruits, juices, and alcohol  Do not eat large meals  When you eat a lot of food at one time, your stomach needs more acid to digest it  Eat 6 small meals each day instead of 3 large ones, and eat slowly  Do not eat meals 2 to 3 hours before bedtime  Elevate the head of your bed  Place 6-inch blocks under the head of your bed frame  You may also use more than one pillow under your head and shoulders while you sleep  Maintain a healthy weight  If you are overweight, weight loss may help relieve symptoms of GERD  Do not smoke  Smoking weakens the lower esophageal sphincter and increases the risk of GERD  Ask your healthcare provider for information if you currently smoke and need help to quit  E-cigarettes or smokeless tobacco still contain nicotine  Talk to your healthcare provider before you use these products  Do not put pressure on your abdomen  Pressure pushes acid up into your esophagus  Do not wear clothing that is tight around your waist  Do not bend over  Bend at the knees if you need to pick something up  Call your local emergency number (911 in the [address] [facility]) if:   You have severe chest pain and sudden trouble breathing  When should I seek immediate care? You have trouble breathing after you vomit  You have trouble swallowing, or pain with swallowing  Your bowel movements are black, bloody, or tarry-looking  Your vomit looks like coffee grounds or has blood in it  When should I call my doctor? You feel full and cannot burp or vomit  You vomit large amounts, or you vomit often  You are losing weight without trying  Your symptoms get worse or do not improve with treatment      You have questions or concerns about your condition or care     CARE AGREEMENT:   You have the right to help plan your care  Learn about your health condition and how it may be treated  Discuss treatment options with your healthcare providers to decide what care you want to receive  You always have the right to refuse treatment  The above information is an  only  It is not intended as medical advice for individual conditions or treatments  Talk to your doctor, nurse or pharmacist before following any medical regimen to see if it is safe and effective for you  © Copyright [employer] 2022 Information is for End User's use only and may not be sold, redistributed or otherwise used for commercial purposes  All illustrations and images included in CareNotes® are the copyrighted property of A D A M , Inc  or [facility] Finn Alston   Depression   WHAT YOU NEED TO KNOW:   What is depression? Depression is a medical condition that causes feelings of sadness or hopelessness that do not go away  Depression may cause you to lose interest in things you used to enjoy  These feelings may interfere with your daily life  What causes or increases my risk for depression? Depression may be caused by changes in brain chemicals that affect your mood  Your risk for depression may be higher if you have any of the following:  Stressful events such as the death of a loved one, unemployment, or divorce    A family history of depression    A chronic medical condition, such as diabetes, heart disease, or cancer    Drug or alcohol abuse    What are the signs and symptoms of depression? Appetite changes, or weight gain or loss    Trouble going to sleep or staying asleep, or sleeping too much    Fatigue or lack of energy    Feeling restless, irritable, or withdrawn    Feeling worthless, hopeless, discouraged, or guilty    Trouble concentrating, remembering things, doing daily tasks, or making decisions    Thoughts about hurting or killing yourself    How is depression diagnosed? Your healthcare provider will ask about your symptoms and how long you have had them  He or she will ask if you have any family members with depression  Tell your healthcare provider about any stressful events in your life  He or she may ask about any other health conditions or medicines you take  How is depression treated? Therapy  is often used together with medicine  Therapy is a way for you to talk about your feelings and anything that may be causing depression  Therapy can be done alone or in a group  It may also be done with family members or a significant other  Antidepressant medicine  may be given to relieve depression  You may need to take this medicine for several weeks before you begin to feel better  Tell your healthcare provider about any side effects or problems you have with your medicine  The type or amount of medicine may need to be changed  How can I manage depression? Get regular physical activity  Try to be active for 30 minutes, 3 to 5 days a week  Physical activity can help relieve depression  Work with your healthcare provider to develop a plan that you enjoy  It may help to ask someone to be active with you  Create a regular sleep schedule  A routine can help you relax before bed  Listen to music, read, or do yoga  Try to go to bed and wake up at the same time every day  Sleep is important for emotional health  Eat a variety of healthy foods  Healthy foods include fruits, vegetables, whole-grain breads, low-fat dairy products, lean meats, fish, and cooked beans  A healthy meal plan is low in fat, salt, and added sugar  Do not drink alcohol or use drugs  Alcohol and drugs can make depression worse  Talk to your therapist or doctor if you need help quitting      The following resources are available at any time to help you, if needed:   [address] [place] [address]: [phone] (9-503-315-JOCE)     Suicide Hotline: 0-491.762.9234 (1-886-ABLZAFP)     For a list of international numbers: https://save org/find-help/international-resources/    Call your local emergency number (911 in the [address]) if:   You think about harming yourself or someone else  You have done something on purpose to hurt yourself  When should I call my therapist or doctor? Your symptoms do not improve  You cannot make it to your next appointment  You have new symptoms  You have questions or concerns about your condition or care  CARE AGREEMENT:   You have the right to help plan your care  Learn about your health condition and how it may be treated  Discuss treatment options with your healthcare providers to decide what care you want to receive  You always have the right to refuse treatment  The above information is an  only  It is not intended as medical advice for individual conditions or treatments  Talk to your doctor, nurse or pharmacist before following any medical regimen to see if it is safe and effective for you  © Copyright [employer] 2022 Information is for End User's use only and may not be sold, redistributed or otherwise used for commercial purposes  All illustrations and images included in CareNotes® are the copyrighted property of [employer]  or [facility] Jeremy Villagomez:   What do I need to know about anxiety? Anxiety is a condition that causes you to feel extremely worried or nervous  The feelings are so strong that they can cause problems with your daily activities or sleep  Anxiety may be triggered by something you fear, or it may happen without a cause  Family or work stress, smoking, caffeine, and alcohol can increase your risk for anxiety  Certain medicines or health conditions can also increase your risk  Anxiety can become a long-term condition if it is not managed or treated  What other common signs and symptoms may occur with anxiety?    Fatigue or muscle tightness    Shaking, restlessness, or irritability    Problems focusing    Trouble sleeping    Feeling jumpy, easily startled, or dizzy    Rapid heartbeat or shortness of breath    What do I need to tell my healthcare provider about my anxiety? Tell your healthcare provider when your symptoms began and what triggers them  Tell your provider if anxiety affects your daily activities  Your provider will also ask about your medical history and if you have family members with a similar condition  Tell your provider about your past and present alcohol, nicotine, or drug use  What can I do to manage anxiety? You may get medicines to help you feel calm and relaxed, and to decrease your symptoms  Medicines are usually given together with therapy or other treatments  The following can help you manage anxiety:  Talk to someone about your anxiety  Your healthcare provider may suggest counseling  Cognitive behavioral therapy can help you understand and change how you react to events that trigger your symptoms  You might feel more comfortable talking with a friend or family member about your anxiety  Choose someone you know will be supportive and encouraging  Find ways to relax  Activities such as exercise, meditation, or listening to music can help you relax  Spend time with friends, or do things you enjoy  Practice deep breathing  Deep breathing can help you relax when you feel anxious  Focus on taking slow, deep breaths several times a day, or during an anxiety attack  Breathe in through your nose and out through your mouth  Create a regular sleep routine  Regular sleep can help you feel calmer during the day  Go to sleep and wake up at the same times every day  Do not watch television or use the computer right before bed  Your room should be comfortable, dark, and quiet  Eat a variety of healthy foods  Healthy foods include fruits, vegetables, low-fat dairy products, lean meats, fish, whole-grain breads, and cooked beans   Healthy foods can help you feel less anxious and have more energy  Exercise regularly  Exercise can increase your energy level  Exercise may also lift your mood and help you sleep better  Your healthcare provider can help you create an exercise plan  Do not smoke  Nicotine and other chemicals in cigarettes and cigars can increase anxiety  Ask your healthcare provider for information if you currently smoke and need help to quit  E-cigarettes or smokeless tobacco still contain nicotine  Talk to your healthcare provider before you use these products  Do not have caffeine  Caffeine can make your symptoms worse  Do not have foods or drinks that are meant to increase your energy level  Limit or do not drink alcohol  Ask your healthcare provider if alcohol is safe for you  You may not be able to drink alcohol if you take certain anxiety or depression medicines  Limit alcohol to 1 drink per day if you are a woman  Limit alcohol to 2 drinks per day if you are a man  A drink of alcohol is 12 ounces of beer, 5 ounces of wine, or 1½ ounces of liquor  Do not use drugs  Drugs can make your anxiety worse  It can also make anxiety hard to manage  Talk to your healthcare provider if you use drugs and want help to quit  Call your local emergency number (911 in the [address] [facility]) if:   You have chest pain, tightness, or heaviness that may spread to your shoulders, arms, jaw, neck, or back  You feel like hurting yourself or someone else  When should I call my doctor? Your symptoms get worse or do not get better with treatment  Your anxiety keeps you from doing your regular daily activities  You have new symptoms since your last visit  You have questions or concerns about your condition or care  CARE AGREEMENT:   You have the right to help plan your care  Learn about your health condition and how it may be treated  Discuss treatment options with your healthcare providers to decide what care you want to receive  You always have the right to refuse treatment  The above information is an  only  It is not intended as medical advice for individual conditions or treatments  Talk to your doctor, nurse or pharmacist before following any medical regimen to see if it is safe and effective for you  © Copyright [employer] 2022 Information is for End User's use only and may not be sold, redistributed or otherwise used for commercial purposes  All illustrations and images included in CareNotes® are the copyrighted property of [employer]  or [address] [place] [address] (By injection)   Semaglutide (del-j-UHJK-tide)  Treats type 2 diabetes  Lowers the risk of heart attack, stroke, or death in patients with type 2 diabetes and heart or blood vessel disease  Also used to help lose weight and keep the weight off in patients with obesity caused by certain conditions  Brand Name(s): Ozempic 0 25 MG or 0 5 MG Doses, Ozempic 1 MG Doses, Wegovy   There may be other brand names for this medicine  When This Medicine Should Not Be Used: This medicine is not right for everyone  Do not use it if you had an allergic reaction to semaglutide, or if you have multiple endocrine neoplasia syndrome type 2 (MEN 2) or if you or anyone in your family has had medullary thyroid cancer  How to Use This Medicine:   Injectable  Your doctor will prescribe your exact dose and tell you how often it should be given  This medicine is given as a shot under your skin  It is given into your stomach, thigh, or upper arm  You may be taught how to give your medicine at home  Make sure you understand all instructions before giving yourself an injection  Do not use more medicine or use it more often than your doctor tells you to  If you use insulin in addition to this medicine, do not mix them into the same syringe  You may give the shots in the same area (including your stomach), but do not give the shots right next to each other    Check the liquid in the pen  It should be clear and colorless  Do not use it if it is cloudy, discolored, or has particles in it  You will be shown the body areas where this shot can be given  Use a different body area each time you give yourself a shot  Keep track of where you give each shot to make sure you rotate body areas  Use a new needle and syringe each time you inject your medicine  Never share medicine pens with others under any circumstances  Sharing needles or pens can result in transmission of infection  This medicine should come with a Medication Guide  Ask your pharmacist for a copy if you do not have one  Missed dose:   Ozempic®: If you miss a dose of this medicine, use it as soon as possible within 5 days after the missed dose  If you miss a dose for more than 5 days, skip the missed dose and go back to your regular dosing schedule  Wegovy™: If you miss a dose, and the next scheduled dose is more than 2 days away, use it as soon as possible  If you miss a dos, and the next scheduled dose is less than 2 days away, skip the missed dose and go back to your regular dosing schedule  If you miss a dose of this medicine for more than 2 weeks, use it on the next scheduled dose  Ask your doctor about how to restart your treatment  Store your new, unused medicine pen in its original carton in the refrigerator  Do not freeze  You may store the opened Ozempic® pen in the refrigerator or at room temperature for 56 days or the opened [facility] pen in the refrigerator or at room temperature for 28 days  Throw away the pen after you use it for 56 days for Ozempic® or 28 days for [facility], even if it still has medicine in it  Drugs and Foods to Avoid:   Ask your doctor or pharmacist before using any other medicine, including over-the-counter medicines, vitamins, and herbal products  Some medicines may affect how semaglutide works   Tell your doctor if you are using other diabetes medicine (including glimepiride, glipizide, glyburide, or insulin) or any oral medicine  Warnings While Using This Medicine:   Tell your doctor if you are pregnant or planning to become pregnant  Do not use this medicine for at least 2 months before you plan to become pregnant  Tell your doctor if you are breastfeeding, or if you have kidney disease, pancreas problems, diabetes, digestion problems, or a history of diabetic retinopathy or depression  This medicine may cause the following problems:  Increased risk of thyroid tumor  Pancreatitis (swelling of the pancreas)  Gallbladder problems  Low blood sugar (when used with other diabetes medicine)  Kidney problems  Eye or vision problems, including diabetic retinopathy  Increased heart rate  Increased risk for depression or thoughts of suicide  Your doctor will do lab tests at regular visits to check on the effects of this medicine  Keep all appointments  Keep all medicine out of the reach of children  Never share your medicine with anyone  Possible Side Effects While Using This Medicine:   Call your doctor right away if you notice any of these side effects:   Allergic reaction: Itching or hives, swelling in your face or hands, swelling or tingling in your mouth or throat, chest tightness, trouble breathing  Blurred vision or any other change in vision  Change in how much or how often you urinate, lower back or side pain, blood in your urine  Shaking, trembling, sweating, fast or pounding heartbeat, lightheadedness, hunger, confusion  Sudden and severe stomach pain, nausea, vomiting, fever, yellow eyes or skin  Unusual moods or behaviors, depression, thoughts of hurting yourself or others  If you notice these less serious side effects, talk with your doctor:   Constipation, diarrhea, passing gas, stomach upset or bloating  Headache, dizziness  Redness, itching, bump, swelling, or any changes in your skin where the shot was given  Tiredness  If you notice other side effects that you think are caused by this medicine, tell your doctor  Call your doctor for medical advice about side effects  You may report side effects to FDA at 7-440-BLJ-0559    © Copyright [employer] [facility] 2022 Information is for End User's use only and may not be sold, redistributed or otherwise used for commercial purposes  The above information is an  only  It is not intended as medical advice for individual conditions or treatments  Talk to your doctor, nurse or pharmacist before following any medical regimen to see if it is safe and effective for you

## 2023-01-30 ENCOUNTER — OFFICE VISIT (OUTPATIENT)
Dept: FAMILY MEDICINE CLINIC | Facility: CLINIC | Age: 58
End: 2023-01-30

## 2023-01-30 ENCOUNTER — TRANSITIONAL CARE MANAGEMENT (OUTPATIENT)
Dept: FAMILY MEDICINE CLINIC | Facility: CLINIC | Age: 58
End: 2023-01-30

## 2023-01-30 VITALS
TEMPERATURE: 97.5 F | RESPIRATION RATE: 18 BRPM | SYSTOLIC BLOOD PRESSURE: 144 MMHG | OXYGEN SATURATION: 96 % | DIASTOLIC BLOOD PRESSURE: 78 MMHG | HEIGHT: 62 IN | BODY MASS INDEX: 35.55 KG/M2 | HEART RATE: 80 BPM | WEIGHT: 193.2 LBS

## 2023-01-30 DIAGNOSIS — E55.9 VITAMIN D DEFICIENCY: ICD-10-CM

## 2023-01-30 DIAGNOSIS — E11.69 DIABETES MELLITUS TYPE 2 IN OBESE (HCC): ICD-10-CM

## 2023-01-30 DIAGNOSIS — F31.4 BIPOLAR DISORDER, CURRENT EPISODE DEPRESSED, SEVERE, WITHOUT PSYCHOTIC FEATURES (HCC): ICD-10-CM

## 2023-01-30 DIAGNOSIS — F31.9 BIPOLAR 1 DISORDER, DEPRESSED (HCC): ICD-10-CM

## 2023-01-30 DIAGNOSIS — F17.210 CIGARETTE SMOKER: ICD-10-CM

## 2023-01-30 DIAGNOSIS — Z72.0 TOBACCO ABUSE: ICD-10-CM

## 2023-01-30 DIAGNOSIS — F41.1 GENERALIZED ANXIETY DISORDER: Chronic | ICD-10-CM

## 2023-01-30 DIAGNOSIS — E78.2 MIXED HYPERLIPIDEMIA: ICD-10-CM

## 2023-01-30 DIAGNOSIS — F13.20 BENZODIAZEPINE DEPENDENCE, CONTINUOUS (HCC): ICD-10-CM

## 2023-01-30 DIAGNOSIS — I10 PRIMARY HYPERTENSION: ICD-10-CM

## 2023-01-30 DIAGNOSIS — Z13.89 SCREENING FOR BLOOD OR PROTEIN IN URINE: ICD-10-CM

## 2023-01-30 DIAGNOSIS — E03.9 HYPOTHYROIDISM, UNSPECIFIED TYPE: ICD-10-CM

## 2023-01-30 DIAGNOSIS — Z76.89 ENCOUNTER FOR SUPPORT AND COORDINATION OF TRANSITION OF CARE: Primary | ICD-10-CM

## 2023-01-30 DIAGNOSIS — Z79.899 LONG-TERM USE OF HIGH-RISK MEDICATION: ICD-10-CM

## 2023-01-30 DIAGNOSIS — E66.9 DIABETES MELLITUS TYPE 2 IN OBESE (HCC): ICD-10-CM

## 2023-01-30 DIAGNOSIS — K21.9 GASTROESOPHAGEAL REFLUX DISEASE WITHOUT ESOPHAGITIS: ICD-10-CM

## 2023-01-30 DIAGNOSIS — J43.9 PULMONARY EMPHYSEMA, UNSPECIFIED EMPHYSEMA TYPE (HCC): ICD-10-CM

## 2023-01-30 LAB
BACTERIA BLD CULT: NORMAL
BACTERIA BLD CULT: NORMAL

## 2023-01-30 RX ORDER — GABAPENTIN 800 MG/1
800 TABLET ORAL 3 TIMES DAILY
Qty: 90 TABLET | Refills: 4 | Status: CANCELLED | OUTPATIENT
Start: 2023-01-30

## 2023-01-30 RX ORDER — NICOTINE 21 MG/24HR
1 PATCH, TRANSDERMAL 24 HOURS TRANSDERMAL DAILY
Qty: 28 PATCH | Refills: 0 | Status: SHIPPED | OUTPATIENT
Start: 2023-01-30

## 2023-01-30 RX ORDER — CLONAZEPAM 0.5 MG/1
0.5 TABLET ORAL 2 TIMES DAILY PRN
Qty: 60 TABLET | Refills: 0 | Status: SHIPPED | OUTPATIENT
Start: 2023-01-30

## 2023-01-30 NOTE — ASSESSMENT & PLAN NOTE
Patient noted to have a history of vitamin D deficiency  Currently counseled on taking vitamin D segmentation  Repeat vitamin D level ordered  Vitamin D 25 hydroxy  Order: [id number]   Status: Final result      Visible to patient: No (inaccessible in [address])      Next appt: 02/07/2023 at 03:00 PM in Cardiology [facility])      0 Result Notes  Component Ref Range & Units 11/2/22 12:54 PM    Vit D, 25-Hydroxy 30 0 - 100 0 ng/mL 41 6            Narrative    This assay is a certified procedure of the CDC Vitamin D Standardization Certification Program (VDSCP)     Deficiency <20ng/ml   Insufficiency 20-30ng/ml   Sufficient  ng/ml     *Patients undergoing fluorescein dye angiography may retain small amounts of fluorescein in the body for 48-72 hours post procedure  Samples containing fluorescein can produce falsely elevated Vitamin D values  If the patient had this procedure, a specimen should be resubmitted post fluorescein clearance         Specimen Collected: 11/02/22 12:54 PM Last Resulted: 11/02/22 10:25 PM         Lab Flowsheet      Order Details      Lab and Collection Details      Routing      Result History     View Encounter Conversation           Result Care Coordination      Patient Communication     Add Comments   Not seen Back to Top           Ordered On 11/2/2022  9:51 AM    Ordering Provider Authorizing Provider Ordering User Ordering Department   MD Linda Gutiérrez MD Miranda Rook, MD [facility]     [phone]     Result Narrative    This assay is a certified procedure of the CDC Vitamin D Standardization Certification Program (VDSCP)     Deficiency <20ng/ml   Insufficiency 20-30ng/ml   Sufficient  ng/ml     *Patients undergoing fluorescein dye angiography may retain small amounts of fluorescein in the body for 48-72 hours post procedure  Samples containing fluorescein can produce falsely elevated Vitamin D values  If the patient had this procedure, a specimen should be resubmitted post fluorescein clearance

## 2023-01-30 NOTE — ASSESSMENT & PLAN NOTE
Long term use of klonopin for anxiety  Medication screening ordered and contract provided  Patient also on gabapentin long term and urine screening provided

## 2023-01-30 NOTE — ASSESSMENT & PLAN NOTE
BP currently 144/78  Patient currently counseled on proper diet, nutrition and exercise  Recheck BP next office visit  Goal BP low 130/70  Patient to maintain a low-sodium diet

## 2023-01-30 NOTE — ASSESSMENT & PLAN NOTE
Patient maintained currently on levothyroxine 25 mg p o  daily  Repeat TSH ordered for reevaluation

## 2023-01-30 NOTE — PROGRESS NOTES
TCM Call     Date and time call was made  1/30/2023  3:34 PM    Hospital care reviewed  Records reviewed    Patient was hospitialized at  [address]    Date of Admission  01/25/23    Date of discharge  01/27/23    Diagnosis  Abdominal Pain    Disposition  Home    Were the patients medications reviewed and updated  Yes    Current Symptoms  None      TCM Call     Post hospital issues  None    Should patient be enrolled in anticoag monitoring? No    Scheduled for follow up?   Yes    Patients specialists  [facility]    Did you obtain your prescribed medications  Yes    Do you need help managing your prescriptions or medications  No    Is transportation to your appointment needed  No    I have advised the patient to call PCP with any new or worsening symptoms  Madeleine Cr LPN

## 2023-01-30 NOTE — UTILIZATION REVIEW
NOTIFICATION OF ADMISSION DISCHARGE   This is a Notification of Discharge from [address]  Please be advised that this patient has been discharge from our facility  Below you will find the admission and discharge date and time including the patient’s disposition  UTILIZATION REVIEW CONTACT:  [address]  Utilization   Network Utilization Review Department  Phone: [phone] x carefully listen to the prompts  All voicemails are confidential   Email: [email or web address]     ADMISSION INFORMATION  PRESENTATION DATE: 1/25/2023 12:11 PM  OBERVATION ADMISSION DATE:   INPATIENT ADMISSION DATE: 1/26/23  1:07 PM   DISCHARGE DATE: 1/27/2023  2:15 PM   DISPOSITION:Home/Self Care    IMPORTANT INFORMATION:  Send all requests for admission clinical reviews, approved or denied determinations and any other requests to dedicated fax number below belonging to the campus where the patient is receiving treatment   List of dedicated fax numbers:  [address] DENIALS (Administrative/Medical Necessity) [phone]   [address] (Maternity/NICU/Pediatrics) [phone]   [facility] [phone]   [place] [address] [phone]   [address] [phone]   220 [facility] [phone]   90 [facility] [phone]   [address] [place] [address] 119 [phone]   [facility]  [phone]   4056 [facility] [phone]   412 [facility] [address] [facility] [phone]

## 2023-01-30 NOTE — ASSESSMENT & PLAN NOTE
Currently patient is maintained on Lantus 25 units subcutaneous daily at bedtime  Patient also maintained on semaglutide 0 5 mg patient had been seen in the ER for abdominal pain  Possible side effect of medication versus constipation  Encouraged to drink fluids as well as her in her diet    Lab Results   Component Value Date    HGBA1C 6 9 (H) 11/02/2022

## 2023-01-30 NOTE — ASSESSMENT & PLAN NOTE
Patient noted to have history of being maintained on clonopin 0 5mg twice daily  Currently present for medication refills

## 2023-02-01 DIAGNOSIS — F41.9 ANXIETY: ICD-10-CM

## 2023-02-01 RX ORDER — ESCITALOPRAM OXALATE 10 MG/1
TABLET ORAL
Qty: 90 TABLET | Refills: 1 | Status: SHIPPED | OUTPATIENT
Start: 2023-02-01

## 2023-02-02 LAB — 7AMINOCLONAZEPAM UR QL CFM: ABNORMAL NG/ML

## 2023-02-06 DIAGNOSIS — F31.4 BIPOLAR DISORDER, CURRENT EPISODE DEPRESSED, SEVERE, WITHOUT PSYCHOTIC FEATURES (HCC): ICD-10-CM

## 2023-02-13 ENCOUNTER — OFFICE VISIT (OUTPATIENT)
Dept: FAMILY MEDICINE CLINIC | Facility: CLINIC | Age: 58
End: 2023-02-13

## 2023-02-13 VITALS
DIASTOLIC BLOOD PRESSURE: 80 MMHG | HEIGHT: 62 IN | RESPIRATION RATE: 16 BRPM | HEART RATE: 85 BPM | WEIGHT: 184.2 LBS | OXYGEN SATURATION: 96 % | TEMPERATURE: 97.2 F | SYSTOLIC BLOOD PRESSURE: 130 MMHG | BODY MASS INDEX: 33.9 KG/M2

## 2023-02-13 DIAGNOSIS — E03.9 ACQUIRED HYPOTHYROIDISM: ICD-10-CM

## 2023-02-13 DIAGNOSIS — I10 PRIMARY HYPERTENSION: ICD-10-CM

## 2023-02-13 DIAGNOSIS — E11.69 DIABETES MELLITUS TYPE 2 IN OBESE (HCC): Primary | ICD-10-CM

## 2023-02-13 DIAGNOSIS — E66.9 DIABETES MELLITUS TYPE 2 IN OBESE (HCC): Primary | ICD-10-CM

## 2023-02-13 DIAGNOSIS — E78.2 MIXED HYPERLIPIDEMIA: ICD-10-CM

## 2023-02-13 DIAGNOSIS — I10 ESSENTIAL HYPERTENSION: ICD-10-CM

## 2023-02-13 DIAGNOSIS — G43.009 MIGRAINE WITHOUT AURA AND WITHOUT STATUS MIGRAINOSUS, NOT INTRACTABLE: ICD-10-CM

## 2023-02-13 RX ORDER — SUMATRIPTAN 50 MG/1
50 TABLET, FILM COATED ORAL ONCE AS NEEDED
Qty: 9 TABLET | Refills: 2 | Status: SHIPPED | OUTPATIENT
Start: 2023-02-13

## 2023-02-13 NOTE — PROGRESS NOTES
Name: Thomas Odom      : 1965      MRN: [id number]  Encounter Provider: Odette Boykin MD  Encounter Date: 2023   Encounter department: [address] [place] [address]     1  Diabetes mellitus type 2 in obese [facility])  Assessment & Plan:    Lab Results   Component Value Date    HGBA1C 6 9 (H) 2022   Increase Ozempic to 1 mg  Check full labs  Orders:  -     semaglutide, 1 mg/dose, (Ozempic) 4 mg/3 mL injection pen; Inject 0 75 mL (1 mg total) under the skin every 7 days  -     Hemoglobin A1C; Future; Expected date: 2023  -     Comprehensive metabolic panel; Future; Expected date: 2023  -     CBC and Platelet; Future; Expected date: 2023  -     Microalbumin / creatinine urine ratio; Future; Expected date: 2023  -     Lipid Panel with Direct LDL reflex; Future; Expected date: 2023    2  Acquired hypothyroidism    3  Essential hypertension  Assessment & Plan:  Blood pressure under control with hydrochlorothiazide  Consider adding losartan  Check full labs      4  Primary hypertension    5  Mixed hyperlipidemia  Assessment & Plan:  Continue atorvastatin  Check lipid profile and hepatic function panel  New prescription is given      6  Migraine without aura and without status migrainosus, not intractable  Assessment & Plan: To patient she has a known history of migraine  No change in character of the headaches  Start sumatriptan as needed  Consider preventative medicine  Orders:  -     SUMAtriptan (Imitrex) 50 mg tablet; Take 1 tablet (50 mg total) by mouth once as needed for migraine for up to 1 dose         Subjective      1  Dm-2-no hypoglycemia  No polyuria or polydipsia  She has noticed less appetite since on Ozempic  Lost few pounds  No abdominal pain nausea or vomiting  No chest pain or increased dyspnea  No claudication pain  No numbness  2  htn-no lightheadedness or palpitations  No syncope  No chest pain  No orthopnea  No edema  3  Headaches-patient has a known history of migraine  Her headaches are episodic  Bitemporal   Throbbing  Associated with some nausea and sensitivity to light and noise  No tinnitus  No upper or lower extremity or facial weakness is    Review of Systems   Constitutional: Negative for appetite change, chills, diaphoresis, fatigue and fever  HENT: Negative for congestion, drooling and sinus pain  Eyes: Negative for discharge and itching  Respiratory: Negative for cough, chest tightness and shortness of breath  Cardiovascular: Negative for chest pain, palpitations and leg swelling  Gastrointestinal: Negative  Endocrine: Negative for polyphagia and polyuria  Genitourinary: Negative for difficulty urinating, dysuria, frequency and urgency  Skin: Negative for pallor and rash  Allergic/Immunologic: Negative for food allergies  Neurological: Positive for headaches  Negative for dizziness, seizures, speech difficulty, light-headedness and numbness  Hematological: Negative for adenopathy  Does not bruise/bleed easily  Psychiatric/Behavioral: Negative for agitation, confusion, decreased concentration, self-injury and suicidal ideas  Current Outpatient Medications on File Prior to Visit   Medication Sig   • Alcohol Swabs 70 % PADS May substitute brand based on insurance coverage  Check glucose TID  • atorvastatin (LIPITOR) 20 mg tablet Take 1 tablet (20 mg total) by mouth daily   • Blood Glucose Monitoring Suppl (OneTouch Verio Reflect) w/Device KIT May substitute brand based on insurance coverage  Check glucose TID     • clonazePAM (KlonoPIN) 0 5 mg tablet Take 1 tablet (0 5 mg total) by mouth 2 (two) times a day as needed for seizures   • ergocalciferol (VITAMIN D2) 50,000 units TAKE 1 CAPSULE BY MOUTH 1 TIME A WEEK   • escitalopram (LEXAPRO) 10 mg tablet TAKE 1 TABLET(10 MG) BY MOUTH DAILY   • gabapentin (NEURONTIN) 800 mg tablet Take 1 tablet (800 mg total) by mouth 3 (three) times a day   • hydrochlorothiazide (HYDRODIURIL) 12 5 mg tablet Take 1 tablet (12 5 mg total) by mouth daily   • insulin glargine (LANTUS) 100 units/mL subcutaneous injection Inject 25 Units under the skin daily at bedtime   • levothyroxine 25 mcg tablet Take 1 tablet (25 mcg total) by mouth daily in the early morning   • metFORMIN (GLUCOPHAGE) 1000 MG tablet TAKE 1 TABLET(1000 MG) BY MOUTH TWICE DAILY WITH MEALS   • metoprolol tartrate (LOPRESSOR) 50 mg tablet Take 1 tablet (50 mg total) by mouth every 12 (twelve) hours   • Microlet Lancets MISC    • nicotine (NICODERM CQ) 21 mg/24 hr TD 24 hr patch Place 1 patch on the skin over 24 hours daily   • OneTouch [employer]ica Lancets 42R MISC May substitute brand based on insurance coverage  Check glucose TID  • polyethylene glycol (MIRALAX) 17 g packet Take 17 g by mouth daily Do not start before January 28, 2023     • traZODone (DESYREL) 100 mg tablet TAKE 2 TABLETS(200 MG) BY MOUTH DAILY AT BEDTIME   • [DISCONTINUED] Semaglutide,0 25 or 0 5MG/DOS, (Ozempic, 0 25 or 0 5 MG/DOSE,) 2 MG/1 5ML SOPN Inject 0 5 mg under the skin once a week   • ARIPiprazole (ABILIFY) 10 mg tablet Take 1 tablet (10 mg total) by mouth daily (Patient not taking: Reported on 1/4/2023)   • pantoprazole (PROTONIX) 40 mg tablet Take 1 tablet (40 mg total) by mouth daily (Patient not taking: Reported on 1/4/2023)   • [DISCONTINUED] benztropine (COGENTIN) 1 mg tablet Take 1 mg by mouth daily (Patient not taking: Reported on 1/4/2023)   • [DISCONTINUED] docusate sodium (COLACE) 100 mg capsule Take 1 capsule (100 mg total) by mouth 2 (two) times a day for 14 days (Patient not taking: Reported on 2/13/2023)       Objective     /80 (BP Location: Right arm, Patient Position: Sitting, Cuff Size: Adult)   Pulse 85   Temp (!) 97 2 °F (36 2 °C) (Temporal)   Resp 16   Ht 5' 2" (1 575 m)   Wt 83 6 kg (184 lb 3 2 oz)   SpO2 96%   BMI 33 69 kg/m²     Physical Exam  Vitals and nursing note reviewed  Constitutional:       Appearance: Normal appearance  She is well-developed  She is obese  She is not ill-appearing  HENT:      Head: Atraumatic  Eyes:      General:         Right eye: No discharge  Left eye: No discharge  Neck:      Thyroid: No thyromegaly  Cardiovascular:      Rate and Rhythm: Normal rate and regular rhythm  Pulses: Normal pulses  Heart sounds: Normal heart sounds  Pulmonary:      Effort: Pulmonary effort is normal       Breath sounds: Normal breath sounds  No wheezing  Chest:      Chest wall: No tenderness  Abdominal:      General: Bowel sounds are normal  There is no distension  Palpations: Abdomen is soft  Tenderness: There is no abdominal tenderness  Musculoskeletal:         General: No tenderness  Cervical back: Neck supple  Right lower leg: No edema  Left lower leg: No edema  Lymphadenopathy:      Cervical: No cervical adenopathy  Skin:     Findings: No erythema  Neurological:      Mental Status: She is alert and oriented to person, place, and time     Psychiatric:         Mood and Affect: Mood normal          Behavior: Behavior normal          Judgment: Judgment normal        NISREEN Jayiabetic Foot Exam    Diabetic Foot Exam

## 2023-02-13 NOTE — ASSESSMENT & PLAN NOTE
Lab Results   Component Value Date    HGBA1C 6 9 (H) 11/02/2022   Increase Ozempic to 1 mg  Check full labs

## 2023-02-13 NOTE — ASSESSMENT & PLAN NOTE
To patient she has a known history of migraine  No change in character of the headaches  Start sumatriptan as needed  Consider preventative medicine

## 2023-03-17 ENCOUNTER — HOSPITAL ENCOUNTER (INPATIENT)
Facility: HOSPITAL | Age: 58
LOS: 5 days | Discharge: HOME | End: 2023-03-23
Attending: EMERGENCY MEDICINE | Admitting: INTERNAL MEDICINE

## 2023-03-17 ENCOUNTER — APPOINTMENT (EMERGENCY)
Dept: CT IMAGING | Facility: HOSPITAL | Age: 58
End: 2023-03-17

## 2023-03-17 DIAGNOSIS — K59.00 CONSTIPATION: ICD-10-CM

## 2023-03-17 DIAGNOSIS — R79.89 ELEVATED LACTIC ACID LEVEL: ICD-10-CM

## 2023-03-17 DIAGNOSIS — J40 BRONCHITIS: ICD-10-CM

## 2023-03-17 DIAGNOSIS — R10.9 ABDOMINAL PAIN: Primary | ICD-10-CM

## 2023-03-17 LAB
ALBUMIN SERPL BCP-MCNC: 4.7 G/DL (ref 3.5–5)
ALP SERPL-CCNC: 80 U/L (ref 34–104)
ALT SERPL W P-5'-P-CCNC: 21 U/L (ref 7–52)
ANION GAP SERPL CALCULATED.3IONS-SCNC: 14 MMOL/L (ref 4–13)
AST SERPL W P-5'-P-CCNC: 15 U/L (ref 13–39)
BASOPHILS # BLD AUTO: 0.04 THOUSANDS/ÂΜL (ref 0–0.1)
BASOPHILS NFR BLD AUTO: 0 % (ref 0–1)
BILIRUB SERPL-MCNC: 0.5 MG/DL (ref 0.2–1)
BILIRUB UR QL STRIP: NEGATIVE
BUN SERPL-MCNC: 4 MG/DL (ref 5–25)
CALCIUM SERPL-MCNC: 10.5 MG/DL (ref 8.4–10.2)
CHLORIDE SERPL-SCNC: 99 MMOL/L (ref 96–108)
CLARITY UR: CLEAR
CO2 SERPL-SCNC: 24 MMOL/L (ref 21–32)
COLOR UR: YELLOW
CREAT SERPL-MCNC: 0.69 MG/DL (ref 0.6–1.3)
EOSINOPHIL # BLD AUTO: 0.07 THOUSAND/ÂΜL (ref 0–0.61)
EOSINOPHIL NFR BLD AUTO: 1 % (ref 0–6)
ERYTHROCYTE [DISTWIDTH] IN BLOOD BY AUTOMATED COUNT: 13.7 % (ref 11.6–15.1)
GFR SERPL CREATININE-BSD FRML MDRD: 98 ML/MIN/1.73SQ M
GLUCOSE SERPL-MCNC: 161 MG/DL (ref 65–140)
GLUCOSE UR STRIP-MCNC: NEGATIVE MG/DL
HCT VFR BLD AUTO: 42.4 % (ref 34.8–46.1)
HGB BLD-MCNC: 14.5 G/DL (ref 11.5–15.4)
HGB UR QL STRIP.AUTO: NEGATIVE
IMM GRANULOCYTES # BLD AUTO: 0.03 THOUSAND/UL (ref 0–0.2)
IMM GRANULOCYTES NFR BLD AUTO: 0 % (ref 0–2)
KETONES UR STRIP-MCNC: NEGATIVE MG/DL
LACTATE SERPL-SCNC: 3 MMOL/L (ref 0.5–2)
LEUKOCYTE ESTERASE UR QL STRIP: NEGATIVE
LIPASE SERPL-CCNC: 6 U/L (ref 11–82)
LYMPHOCYTES # BLD AUTO: 3.21 THOUSANDS/ÂΜL (ref 0.6–4.47)
LYMPHOCYTES NFR BLD AUTO: 28 % (ref 14–44)
MAGNESIUM SERPL-MCNC: 2 MG/DL (ref 1.9–2.7)
MCH RBC QN AUTO: 30 PG (ref 26.8–34.3)
MCHC RBC AUTO-ENTMCNC: 34.2 G/DL (ref 31.4–37.4)
MCV RBC AUTO: 88 FL (ref 82–98)
MONOCYTES # BLD AUTO: 1.24 THOUSAND/ÂΜL (ref 0.17–1.22)
MONOCYTES NFR BLD AUTO: 11 % (ref 4–12)
NEUTROPHILS # BLD AUTO: 6.7 THOUSANDS/ÂΜL (ref 1.85–7.62)
NEUTS SEG NFR BLD AUTO: 60 % (ref 43–75)
NITRITE UR QL STRIP: NEGATIVE
NRBC BLD AUTO-RTO: 0 /100 WBCS
PH UR STRIP.AUTO: 6 [PH]
PLATELET # BLD AUTO: 434 THOUSANDS/UL (ref 149–390)
PMV BLD AUTO: 9.8 FL (ref 8.9–12.7)
POTASSIUM SERPL-SCNC: 3.5 MMOL/L (ref 3.5–5.3)
PROT SERPL-MCNC: 8.4 G/DL (ref 6.4–8.4)
PROT UR STRIP-MCNC: NEGATIVE MG/DL
RBC # BLD AUTO: 4.84 MILLION/UL (ref 3.81–5.12)
SODIUM SERPL-SCNC: 137 MMOL/L (ref 135–147)
SP GR UR STRIP.AUTO: <=1.005 (ref 1–1.03)
UROBILINOGEN UR QL STRIP.AUTO: 0.2 E.U./DL
WBC # BLD AUTO: 11.29 THOUSAND/UL (ref 4.31–10.16)

## 2023-03-17 RX ORDER — PANTOPRAZOLE SODIUM 40 MG/10ML
40 INJECTION, POWDER, LYOPHILIZED, FOR SOLUTION INTRAVENOUS EVERY 12 HOURS SCHEDULED
Status: DISCONTINUED | OUTPATIENT
Start: 2023-03-18 | End: 2023-03-23 | Stop reason: HOSPADM

## 2023-03-17 RX ORDER — HEPARIN SODIUM 5000 [USP'U]/ML
5000 INJECTION, SOLUTION INTRAVENOUS; SUBCUTANEOUS EVERY 8 HOURS SCHEDULED
Status: DISCONTINUED | OUTPATIENT
Start: 2023-03-18 | End: 2023-03-23 | Stop reason: HOSPADM

## 2023-03-17 RX ORDER — SODIUM CHLORIDE, SODIUM LACTATE, POTASSIUM CHLORIDE, CALCIUM CHLORIDE 600; 310; 30; 20 MG/100ML; MG/100ML; MG/100ML; MG/100ML
100 INJECTION, SOLUTION INTRAVENOUS CONTINUOUS
Status: DISCONTINUED | OUTPATIENT
Start: 2023-03-17 | End: 2023-03-19

## 2023-03-17 RX ORDER — HYDRALAZINE HYDROCHLORIDE 20 MG/ML
5 INJECTION INTRAMUSCULAR; INTRAVENOUS EVERY 6 HOURS PRN
Status: DISCONTINUED | OUTPATIENT
Start: 2023-03-17 | End: 2023-03-21

## 2023-03-17 RX ORDER — INSULIN GLARGINE 100 [IU]/ML
25 INJECTION, SOLUTION SUBCUTANEOUS
Status: DISCONTINUED | OUTPATIENT
Start: 2023-03-18 | End: 2023-03-18

## 2023-03-17 RX ORDER — NICOTINE 21 MG/24HR
1 PATCH, TRANSDERMAL 24 HOURS TRANSDERMAL DAILY
Status: DISCONTINUED | OUTPATIENT
Start: 2023-03-18 | End: 2023-03-23 | Stop reason: HOSPADM

## 2023-03-17 RX ORDER — MORPHINE SULFATE 4 MG/ML
4 INJECTION, SOLUTION INTRAMUSCULAR; INTRAVENOUS ONCE
Status: COMPLETED | OUTPATIENT
Start: 2023-03-17 | End: 2023-03-17

## 2023-03-17 RX ORDER — ONDANSETRON 2 MG/ML
4 INJECTION INTRAMUSCULAR; INTRAVENOUS ONCE
Status: COMPLETED | OUTPATIENT
Start: 2023-03-17 | End: 2023-03-17

## 2023-03-17 RX ORDER — INSULIN LISPRO 100 [IU]/ML
1-6 INJECTION, SOLUTION INTRAVENOUS; SUBCUTANEOUS EVERY 6 HOURS SCHEDULED
Status: DISCONTINUED | OUTPATIENT
Start: 2023-03-18 | End: 2023-03-20

## 2023-03-17 RX ADMIN — ONDANSETRON 4 MG: 2 INJECTION INTRAMUSCULAR; INTRAVENOUS at 21:19

## 2023-03-17 RX ADMIN — SODIUM CHLORIDE 1000 ML: 0.9 INJECTION, SOLUTION INTRAVENOUS at 21:19

## 2023-03-17 RX ADMIN — IOHEXOL 100 ML: 350 INJECTION, SOLUTION INTRAVENOUS at 22:27

## 2023-03-17 RX ADMIN — MORPHINE SULFATE 4 MG: 4 INJECTION INTRAVENOUS at 21:19

## 2023-03-17 RX ADMIN — MORPHINE SULFATE 4 MG: 4 INJECTION INTRAVENOUS at 22:39

## 2023-03-18 ENCOUNTER — APPOINTMENT (INPATIENT)
Dept: RADIOLOGY | Facility: HOSPITAL | Age: 58
End: 2023-03-18

## 2023-03-18 LAB
ANION GAP SERPL CALCULATED.3IONS-SCNC: 10 MMOL/L (ref 4–13)
ANION GAP SERPL CALCULATED.3IONS-SCNC: 10 MMOL/L (ref 4–13)
BASOPHILS # BLD AUTO: 0.02 THOUSANDS/ÂΜL (ref 0–0.1)
BASOPHILS # BLD AUTO: 0.03 THOUSANDS/ÂΜL (ref 0–0.1)
BASOPHILS NFR BLD AUTO: 0 % (ref 0–1)
BASOPHILS NFR BLD AUTO: 0 % (ref 0–1)
BUN SERPL-MCNC: 2 MG/DL (ref 5–25)
BUN SERPL-MCNC: 3 MG/DL (ref 5–25)
CALCIUM SERPL-MCNC: 8.7 MG/DL (ref 8.4–10.2)
CALCIUM SERPL-MCNC: 9 MG/DL (ref 8.4–10.2)
CHLORIDE SERPL-SCNC: 102 MMOL/L (ref 96–108)
CHLORIDE SERPL-SCNC: 105 MMOL/L (ref 96–108)
CO2 SERPL-SCNC: 25 MMOL/L (ref 21–32)
CO2 SERPL-SCNC: 27 MMOL/L (ref 21–32)
CREAT SERPL-MCNC: 0.5 MG/DL (ref 0.6–1.3)
CREAT SERPL-MCNC: 0.56 MG/DL (ref 0.6–1.3)
EOSINOPHIL # BLD AUTO: 0.07 THOUSAND/ÂΜL (ref 0–0.61)
EOSINOPHIL # BLD AUTO: 0.08 THOUSAND/ÂΜL (ref 0–0.61)
EOSINOPHIL NFR BLD AUTO: 1 % (ref 0–6)
EOSINOPHIL NFR BLD AUTO: 1 % (ref 0–6)
ERYTHROCYTE [DISTWIDTH] IN BLOOD BY AUTOMATED COUNT: 13.6 % (ref 11.6–15.1)
ERYTHROCYTE [DISTWIDTH] IN BLOOD BY AUTOMATED COUNT: 13.7 % (ref 11.6–15.1)
EST. AVERAGE GLUCOSE BLD GHB EST-MCNC: 111 MG/DL
GFR SERPL CREATININE-BSD FRML MDRD: 105 ML/MIN/1.73SQ M
GFR SERPL CREATININE-BSD FRML MDRD: 109 ML/MIN/1.73SQ M
GLUCOSE P FAST SERPL-MCNC: 85 MG/DL (ref 65–99)
GLUCOSE SERPL-MCNC: 100 MG/DL (ref 65–140)
GLUCOSE SERPL-MCNC: 104 MG/DL (ref 65–140)
GLUCOSE SERPL-MCNC: 106 MG/DL (ref 65–140)
GLUCOSE SERPL-MCNC: 112 MG/DL (ref 65–140)
GLUCOSE SERPL-MCNC: 118 MG/DL (ref 65–140)
GLUCOSE SERPL-MCNC: 85 MG/DL (ref 65–140)
GLUCOSE SERPL-MCNC: 85 MG/DL (ref 65–140)
HBA1C MFR BLD: 5.5 %
HCT VFR BLD AUTO: 36.9 % (ref 34.8–46.1)
HCT VFR BLD AUTO: 37.7 % (ref 34.8–46.1)
HGB BLD-MCNC: 12 G/DL (ref 11.5–15.4)
HGB BLD-MCNC: 12.7 G/DL (ref 11.5–15.4)
IMM GRANULOCYTES # BLD AUTO: 0.03 THOUSAND/UL (ref 0–0.2)
IMM GRANULOCYTES # BLD AUTO: 0.04 THOUSAND/UL (ref 0–0.2)
IMM GRANULOCYTES NFR BLD AUTO: 0 % (ref 0–2)
IMM GRANULOCYTES NFR BLD AUTO: 0 % (ref 0–2)
LACTATE SERPL-SCNC: 0.9 MMOL/L (ref 0.5–2)
LACTATE SERPL-SCNC: 1.5 MMOL/L (ref 0.5–2)
LYMPHOCYTES # BLD AUTO: 2.37 THOUSANDS/ÂΜL (ref 0.6–4.47)
LYMPHOCYTES # BLD AUTO: 2.64 THOUSANDS/ÂΜL (ref 0.6–4.47)
LYMPHOCYTES NFR BLD AUTO: 21 % (ref 14–44)
LYMPHOCYTES NFR BLD AUTO: 23 % (ref 14–44)
MAGNESIUM SERPL-MCNC: 2 MG/DL (ref 1.9–2.7)
MCH RBC QN AUTO: 29.7 PG (ref 26.8–34.3)
MCH RBC QN AUTO: 29.7 PG (ref 26.8–34.3)
MCHC RBC AUTO-ENTMCNC: 32.5 G/DL (ref 31.4–37.4)
MCHC RBC AUTO-ENTMCNC: 33.7 G/DL (ref 31.4–37.4)
MCV RBC AUTO: 88 FL (ref 82–98)
MCV RBC AUTO: 91 FL (ref 82–98)
MONOCYTES # BLD AUTO: 1.05 THOUSAND/ÂΜL (ref 0.17–1.22)
MONOCYTES # BLD AUTO: 1.23 THOUSAND/ÂΜL (ref 0.17–1.22)
MONOCYTES NFR BLD AUTO: 11 % (ref 4–12)
MONOCYTES NFR BLD AUTO: 9 % (ref 4–12)
NEUTROPHILS # BLD AUTO: 7.46 THOUSANDS/ÂΜL (ref 1.85–7.62)
NEUTROPHILS # BLD AUTO: 7.68 THOUSANDS/ÂΜL (ref 1.85–7.62)
NEUTS SEG NFR BLD AUTO: 65 % (ref 43–75)
NEUTS SEG NFR BLD AUTO: 69 % (ref 43–75)
NRBC BLD AUTO-RTO: 0 /100 WBCS
NRBC BLD AUTO-RTO: 0 /100 WBCS
PLATELET # BLD AUTO: 357 THOUSANDS/UL (ref 149–390)
PLATELET # BLD AUTO: 358 THOUSANDS/UL (ref 149–390)
PMV BLD AUTO: 10 FL (ref 8.9–12.7)
PMV BLD AUTO: 9.5 FL (ref 8.9–12.7)
POTASSIUM SERPL-SCNC: 3 MMOL/L (ref 3.5–5.3)
POTASSIUM SERPL-SCNC: 3.6 MMOL/L (ref 3.5–5.3)
RBC # BLD AUTO: 4.04 MILLION/UL (ref 3.81–5.12)
RBC # BLD AUTO: 4.28 MILLION/UL (ref 3.81–5.12)
SODIUM SERPL-SCNC: 137 MMOL/L (ref 135–147)
SODIUM SERPL-SCNC: 142 MMOL/L (ref 135–147)
WBC # BLD AUTO: 11.25 THOUSAND/UL (ref 4.31–10.16)
WBC # BLD AUTO: 11.45 THOUSAND/UL (ref 4.31–10.16)

## 2023-03-18 RX ORDER — SODIUM PHOSPHATE, DIBASIC AND SODIUM PHOSPHATE, MONOBASIC 7; 19 G/133ML; G/133ML
1 ENEMA RECTAL ONCE
Status: COMPLETED | OUTPATIENT
Start: 2023-03-18 | End: 2023-03-18

## 2023-03-18 RX ORDER — POTASSIUM CHLORIDE 14.9 MG/ML
20 INJECTION INTRAVENOUS ONCE
Status: COMPLETED | OUTPATIENT
Start: 2023-03-18 | End: 2023-03-18

## 2023-03-18 RX ORDER — DILTIAZEM HYDROCHLORIDE 5 MG/ML
15 INJECTION INTRAVENOUS ONCE
Status: COMPLETED | OUTPATIENT
Start: 2023-03-18 | End: 2023-03-18

## 2023-03-18 RX ORDER — INSULIN GLARGINE 100 [IU]/ML
25 INJECTION, SOLUTION SUBCUTANEOUS
Status: DISCONTINUED | OUTPATIENT
Start: 2023-03-19 | End: 2023-03-18

## 2023-03-18 RX ORDER — METOCLOPRAMIDE HYDROCHLORIDE 5 MG/ML
10 INJECTION INTRAMUSCULAR; INTRAVENOUS EVERY 6 HOURS PRN
Status: DISCONTINUED | OUTPATIENT
Start: 2023-03-18 | End: 2023-03-23 | Stop reason: HOSPADM

## 2023-03-18 RX ORDER — ONDANSETRON 2 MG/ML
4 INJECTION INTRAMUSCULAR; INTRAVENOUS EVERY 6 HOURS PRN
Status: DISCONTINUED | OUTPATIENT
Start: 2023-03-18 | End: 2023-03-18

## 2023-03-18 RX ORDER — LORAZEPAM 2 MG/ML
1 INJECTION INTRAMUSCULAR EVERY 4 HOURS PRN
Status: DISCONTINUED | OUTPATIENT
Start: 2023-03-18 | End: 2023-03-23 | Stop reason: HOSPADM

## 2023-03-18 RX ORDER — HYDROMORPHONE HCL/PF 1 MG/ML
1 SYRINGE (ML) INJECTION EVERY 4 HOURS PRN
Status: DISCONTINUED | OUTPATIENT
Start: 2023-03-18 | End: 2023-03-22

## 2023-03-18 RX ORDER — MORPHINE SULFATE 4 MG/ML
4 INJECTION, SOLUTION INTRAMUSCULAR; INTRAVENOUS ONCE
Status: COMPLETED | OUTPATIENT
Start: 2023-03-18 | End: 2023-03-18

## 2023-03-18 RX ORDER — LIDOCAINE 50 MG/G
1 PATCH TOPICAL DAILY
Status: DISCONTINUED | OUTPATIENT
Start: 2023-03-19 | End: 2023-03-23 | Stop reason: HOSPADM

## 2023-03-18 RX ORDER — INSULIN GLARGINE 100 [IU]/ML
25 INJECTION, SOLUTION SUBCUTANEOUS
Status: DISCONTINUED | OUTPATIENT
Start: 2023-03-18 | End: 2023-03-20

## 2023-03-18 RX ORDER — KETOROLAC TROMETHAMINE 30 MG/ML
15 INJECTION, SOLUTION INTRAMUSCULAR; INTRAVENOUS EVERY 6 HOURS PRN
Status: DISCONTINUED | OUTPATIENT
Start: 2023-03-18 | End: 2023-03-18

## 2023-03-18 RX ORDER — LORAZEPAM 2 MG/ML
0.5 INJECTION INTRAMUSCULAR EVERY 6 HOURS PRN
Status: DISCONTINUED | OUTPATIENT
Start: 2023-03-18 | End: 2023-03-18

## 2023-03-18 RX ORDER — LORAZEPAM 2 MG/ML
1 INJECTION INTRAMUSCULAR ONCE
Status: COMPLETED | OUTPATIENT
Start: 2023-03-18 | End: 2023-03-18

## 2023-03-18 RX ORDER — METOPROLOL TARTRATE 5 MG/5ML
5 INJECTION INTRAVENOUS EVERY 6 HOURS PRN
Status: DISCONTINUED | OUTPATIENT
Start: 2023-03-18 | End: 2023-03-21

## 2023-03-18 RX ORDER — POTASSIUM CHLORIDE 29.8 MG/ML
40 INJECTION INTRAVENOUS ONCE
Status: DISCONTINUED | OUTPATIENT
Start: 2023-03-18 | End: 2023-03-18 | Stop reason: CLARIF

## 2023-03-18 RX ADMIN — PANTOPRAZOLE SODIUM 40 MG: 40 INJECTION, POWDER, FOR SOLUTION INTRAVENOUS at 20:03

## 2023-03-18 RX ADMIN — MORPHINE SULFATE 4 MG: 4 INJECTION INTRAVENOUS at 00:14

## 2023-03-18 RX ADMIN — HEPARIN SODIUM 5000 UNITS: 5000 INJECTION INTRAVENOUS; SUBCUTANEOUS at 10:24

## 2023-03-18 RX ADMIN — SODIUM CHLORIDE, SODIUM LACTATE, POTASSIUM CHLORIDE, AND CALCIUM CHLORIDE 100 ML/HR: .6; .31; .03; .02 INJECTION, SOLUTION INTRAVENOUS at 11:21

## 2023-03-18 RX ADMIN — POTASSIUM CHLORIDE 20 MEQ: 14.9 INJECTION, SOLUTION INTRAVENOUS at 14:27

## 2023-03-18 RX ADMIN — ONDANSETRON 4 MG: 2 INJECTION INTRAMUSCULAR; INTRAVENOUS at 17:25

## 2023-03-18 RX ADMIN — NICOTINE 1 PATCH: 14 PATCH, EXTENDED RELEASE TRANSDERMAL at 08:27

## 2023-03-18 RX ADMIN — MORPHINE SULFATE 2 MG: 2 INJECTION, SOLUTION INTRAMUSCULAR; INTRAVENOUS at 13:24

## 2023-03-18 RX ADMIN — METOCLOPRAMIDE HYDROCHLORIDE 10 MG: 5 INJECTION INTRAMUSCULAR; INTRAVENOUS at 20:03

## 2023-03-18 RX ADMIN — LORAZEPAM 0.5 MG: 2 INJECTION INTRAMUSCULAR; INTRAVENOUS at 01:28

## 2023-03-18 RX ADMIN — LORAZEPAM 0.5 MG: 2 INJECTION INTRAMUSCULAR; INTRAVENOUS at 11:25

## 2023-03-18 RX ADMIN — PANTOPRAZOLE SODIUM 40 MG: 40 INJECTION, POWDER, FOR SOLUTION INTRAVENOUS at 01:08

## 2023-03-18 RX ADMIN — DILTIAZEM HYDROCHLORIDE 15 MG: 5 INJECTION INTRAVENOUS at 19:54

## 2023-03-18 RX ADMIN — HEPARIN SODIUM 5000 UNITS: 5000 INJECTION INTRAVENOUS; SUBCUTANEOUS at 19:54

## 2023-03-18 RX ADMIN — ONDANSETRON 4 MG: 2 INJECTION INTRAMUSCULAR; INTRAVENOUS at 01:27

## 2023-03-18 RX ADMIN — METOPROLOL TARTRATE 5 MG: 5 INJECTION INTRAVENOUS at 18:34

## 2023-03-18 RX ADMIN — HYDROMORPHONE HYDROCHLORIDE 1 MG: 1 INJECTION, SOLUTION INTRAMUSCULAR; INTRAVENOUS; SUBCUTANEOUS at 20:01

## 2023-03-18 RX ADMIN — MORPHINE SULFATE 2 MG: 2 INJECTION, SOLUTION INTRAMUSCULAR; INTRAVENOUS at 04:02

## 2023-03-18 RX ADMIN — POTASSIUM CHLORIDE 20 MEQ: 14.9 INJECTION, SOLUTION INTRAVENOUS at 18:18

## 2023-03-18 RX ADMIN — ONDANSETRON 4 MG: 2 INJECTION INTRAMUSCULAR; INTRAVENOUS at 10:24

## 2023-03-18 RX ADMIN — MORPHINE SULFATE 2 MG: 2 INJECTION, SOLUTION INTRAMUSCULAR; INTRAVENOUS at 17:24

## 2023-03-18 RX ADMIN — MORPHINE SULFATE 2 MG: 2 INJECTION, SOLUTION INTRAMUSCULAR; INTRAVENOUS at 08:42

## 2023-03-18 RX ADMIN — SODIUM CHLORIDE, SODIUM LACTATE, POTASSIUM CHLORIDE, AND CALCIUM CHLORIDE 100 ML/HR: .6; .31; .03; .02 INJECTION, SOLUTION INTRAVENOUS at 01:10

## 2023-03-18 RX ADMIN — HEPARIN SODIUM 5000 UNITS: 5000 INJECTION INTRAVENOUS; SUBCUTANEOUS at 01:08

## 2023-03-18 RX ADMIN — PANTOPRAZOLE SODIUM 40 MG: 40 INJECTION, POWDER, FOR SOLUTION INTRAVENOUS at 08:27

## 2023-03-18 RX ADMIN — LORAZEPAM 1 MG: 2 INJECTION INTRAMUSCULAR; INTRAVENOUS at 17:45

## 2023-03-18 RX ADMIN — SODIUM PHOSPHATE, DIBASIC AND SODIUM PHOSPHATE, MONOBASIC 1 ENEMA: 7; 19 ENEMA RECTAL at 14:27

## 2023-03-18 NOTE — UTILIZATION REVIEW
Initial Clinical Review    Observation 3/17/23 @ 2330 converted to inpatient admission 3/18/23 @ 1323 for continued care & tx for abd pain  Admission: Date/Time/Statement:   Admission Orders (From admission, onward)     Ordered        03/18/23 1323  Inpatient Admission  Once            03/17/23 2330  Place in Observation  Once                      Orders Placed This Encounter   Procedures   • Inpatient Admission     Standing Status:   Standing     Number of Occurrences:   1     Order Specific Question:   Level of Care     Answer:   Med Surg [16]     Order Specific Question:   Estimated length of stay     Answer:   More than 2 Midnights     Order Specific Question:   Certification     Answer:   I certify that inpatient services are medically necessary for this patient for a duration of greater than two midnights  See H&P and MD Progress Notes for additional information about the patient's course of treatment  ED Arrival Information     Expected   -    Arrival   3/17/2023 20:23    Acuity   Urgent            Means of arrival   Walk-In    Escorted by   Self    Service   Hospitalist    Admission type   Emergency            Arrival complaint   ABDOMINAL PAIN  NAUSEA           Chief Complaint   Patient presents with   • Abdominal Pain     Pt has nausea and states she has not pooped in about a week  She been taking laxatives  Pt stated she vomited yesterday  Initial Presentation:    56 yof to ER from home c/o generalized abdominal pain, constipation, nausea  Patient notes no relief with prune juice and laxative medication at home; no normal BM x 5 days  IDDM, HTN, bipolar disorder, tobacco use, hypothyroidism; multiple surgeries: appy, hyster, jourdan, bowel resection  Presents tachycardic, hypertensive, abd distended with diffuse tenderness upon palpation  Rectal exam-no external lesions or ulcerations; normal tone; no stool noted   Admission work-up showing concerns for gastritis, leukocytosis, elevated lactic acid  Placed in observation status for abd pain  Placed in observation status  Surgery consulted, NGT inserted  Observation to IP admission 3/18/23:  Episode of tachycardia  Patient asymptomatic, only complaining of continued abdominal pain  EKG obtained revealing SVT was given 5mg IV lopressor and 15mg IV Cardizem with improvement  Gave Reglan/dilaudid for nausea and pain with improvement in symptoms  Repeat KUB ordered  EKG with sinus tachycardia HR 90-110s  Per surg: Constipation/abdominal pain/abdominal distention  abd softly distended  No flatus or BM noted  NPO/NGT  DAY 2- 3/19/23:  Abdomen soft, non tender Bowel sounds present passing flatus had small watery, brown BM, pain improved  Trial advancing diet to full liquids, IVF maintained  Using IV Dilaudid for pain control, IV Reglan for nausea & IV Ativan for anxiety      ED Triage Vitals   Temperature Pulse Respirations Blood Pressure SpO2   03/17/23 2028 03/17/23 2028 03/17/23 2041 03/17/23 2029 03/17/23 2028   98 4 °F (36 9 °C) (!) 115 18 (!) 148/101 100 %      Temp Source Heart Rate Source Patient Position - Orthostatic VS BP Location FiO2 (%)   03/17/23 2028 03/17/23 2028 03/17/23 2028 03/17/23 2028 --   Oral Monitor Sitting Left arm       Pain Score       03/17/23 2028       7          Wt Readings from Last 1 Encounters:   03/18/23 78 4 kg (172 lb 12 8 oz)     Additional Vital Signs:   03/19/23 1124 98 °F (36 7 °C) 89 12 141/71 -- 93 % -- Lying   03/19/23 0744 98 7 °F (37 1 °C) 106 Abnormal  18 164/93 -- -- -- Lying   03/19/23 0541 99 7 °F (37 6 °C) 112 Abnormal  18 157/87 117 -- -- Lying   03/19/23 0211 98 7 °F (37 1 °C) -- -- -- -- -- -- --   03/19/23 0041 100 °F (37 8 °C) 102 17 151/94 111 99 % None (Room air) Lying   03/18/23 2226 -- 100 -- -- -- -- -- --   03/18/23 2059 100 °F (37 8 °C) 115 Abnormal  18 148/87 109 96 % None (Room air) Lying   03/18/23 2005 -- 125 Abnormal  -- -- -- -- -- --   03/18/23 1916 98 5 °F (36 9 °C) 156 Abnormal  19 141/98 -- 90 % -- Lying   03/18/23 1823 -- 151 Abnormal  20 146/97 -- -- -- Lying   03/18/23 1324 98 3 °F (36 8 °C) 99 16 156/97 -- 93 % -- Lying   03/18/23 0751 98 4 °F (36 9 °C) 97 16 133/75 -- -- -- Lying   03/18/23 0401 -- 103 -- 111/64 78 -- -- --   03/18/23 0053 98 °F (36 7 °C) 96 16 121/70 88 95 % None (Room air) Sitting   03/18/23 0000 98 2 °F (36 8 °C) 98 16 118/81 -- 95 % None (Room air) Lying   03/17/23 2348 -- -- -- -- -- 94 % None (Room air) --   03/17/23 2237 -- 99 16 118/71 -- 97 % None (Room air) Lying   03/17/23 2041 -- -- 18 -- -- -- -- --   03/17/23 2029 -- -- -- 148/101 Abnormal  -- -- -- --   03/17/23 2028 98 4 °F (36 9 °C) 115 Abnormal  -- -- -- 100 % None (Room air) Sitting     Pertinent Labs/Diagnostic Test Results:   3/18 XR abdomen 1 view kub  Nonspecific nonobstructive bowel gas pattern  CT abdomen pelvis with contrast   Final Result  (03/17 2310)      Findings concerning for gastritis  Recommend correlation with patient's symptoms  Fluid-filled colon and multiple small bowel loops but no wall thickening or hyperemia suggestive of laxative use         Results from last 7 days   Lab Units 03/18/23  0436 03/17/23 2123   WBC Thousand/uL 11 45* 11 29*   HEMOGLOBIN g/dL 12 0 14 5   HEMATOCRIT % 36 9 42 4   PLATELETS Thousands/uL 358 434*   NEUTROS ABS Thousands/µL 7 46 6 70     Results from last 7 days   Lab Units 03/18/23 0436 03/17/23 2123   SODIUM mmol/L 137 137   POTASSIUM mmol/L 3 0* 3 5   CHLORIDE mmol/L 102 99   CO2 mmol/L 25 24   ANION GAP mmol/L 10 14*   BUN mg/dL 2* 4*   CREATININE mg/dL 0 50* 0 69   EGFR ml/min/1 73sq m 109 98   CALCIUM mg/dL 8 7 10 5*   MAGNESIUM mg/dL  --  2 0     Results from last 7 days   Lab Units 03/17/23  2123   AST U/L 15   ALT U/L 21   ALK PHOS U/L 80   TOTAL PROTEIN g/dL 8 4   ALBUMIN g/dL 4 7   TOTAL BILIRUBIN mg/dL 0 50     Results from last 7 days   Lab Units 03/18/23  1154 03/18/23  0559 03/18/23  0107   POC GLUCOSE mg/dl 118 100 85 Results from last 7 days   Lab Units 03/18/23  0436 03/17/23 2123   GLUCOSE RANDOM mg/dL 85 161*             BETA-HYDROXYBUTYRATE   Date Value Ref Range Status   03/23/2021 5 3 (H) <0 6 mmol/L Final   05/12/2020 0 1 <0 6 mmol/L Final        Results from last 7 days   Lab Units 03/18/23  0201 03/17/23 2123   LACTIC ACID mmol/L 1 5 3 0*       Results from last 7 days   Lab Units 03/17/23 2123   LIPASE u/L 6*     Results from last 7 days   Lab Units 03/17/23  2142   CLARITY UA  Clear   COLOR UA  Yellow   SPEC GRAV UA  <=1 005   PH UA  6 0   GLUCOSE UA mg/dl Negative   KETONES UA mg/dl Negative   BLOOD UA  Negative   PROTEIN UA mg/dl Negative   NITRITE UA  Negative   BILIRUBIN UA  Negative   UROBILINOGEN UA E U /dl 0 2   LEUKOCYTES UA  Negative       ED Treatment:   Medication Administration from 03/17/2023 2022 to 03/18/2023 0044       Date/Time Order Dose Route Action     03/17/2023 2119 EDT sodium chloride 0 9 % bolus 1,000 mL 1,000 mL Intravenous New Bag     03/17/2023 2119 EDT morphine injection 4 mg 4 mg Intravenous Given     03/17/2023 2119 EDT ondansetron (ZOFRAN) injection 4 mg 4 mg Intravenous Given     03/17/2023 2227 EDT iohexol (OMNIPAQUE) 350 MG/ML injection (SINGLE-DOSE) 100 mL 100 mL Intravenous Given     03/17/2023 2239 EDT morphine injection 4 mg 4 mg Intravenous Given     03/18/2023 0014 EDT morphine injection 4 mg 4 mg Intravenous Given        Past Medical History:   Diagnosis Date   • Addiction to drug [facility])    • Adjustment disorder    • Alcohol abuse    • Alcoholism ([facility] )    • Anxiety    • Bipolar disorder ([facility] )    • Bowel obstruction ([facility] )    • Depression    • Diabetes type 2, controlled ([facility] )    • Disease of thyroid gland    • Gastritis    • Head injury    • Heart palpitations    • Hyperlipidemia    • Hypertension    • Hypothyroidism    • Psychiatric illness    • PTSD (post-traumatic stress disorder)    • Seizure ([facility] )    • Seizures ([facility] )    • Sleep difficulties    • Substance abuse ([facility] ) • Suicide attempt [facility])    • Withdrawal symptoms, drug or narcotic ([address] )      Present on Admission:  • Abdominal pain  • Essential hypertension  • Cigarette smoker  • Diabetes mellitus type 2 in obese [facility])  • Lactic acidosis  • Generalized anxiety disorder      Admitting Diagnosis: Abdominal pain [R10 9]  Constipation [K59 00]  Elevated lactic acid level [R79 89]  Age/Sex: 56 y o  female  Admission Orders:  Consult surgery  accuchecks  CHACORTA protocol  Cont pulse ox  Telemetry     Scheduled Medications:  Medications 03/17 03/18 03/19   ARIPiprazole (ABILIFY) tablet 10 mg  Dose: 10 mg  Freq: Daily Route: PO  Start: 03/19/23 1030      1050        diltiazem (CARDIZEM) injection 15 mg  Dose: 15 mg  Freq: Once Route: IV  Start: 03/18/23 1945 End: 03/18/23 1954   Admin Instructions:   Hold for heart rate less than 50 beats per minute  Administer over 2 minutes  Refrigerate  Order specific questions:   Hold for systolic blood pressure less than (mmHg) 110     1954         escitalopram (LEXAPRO) tablet 10 mg  Dose: 10 mg  Freq: Daily Route: PO  Start: 03/19/23 1030   Admin Instructions:   LOOK ALIKE SOUND ALIKE MED      1050        gabapentin (NEURONTIN) capsule 800 mg  Dose: 800 mg  Freq: 3 times daily Route: PO  Start: 03/19/23 1030   Admin Instructions:   LOOK ALIKE SOUND [employer] MED      [id number]     2100        heparin (porcine) subcutaneous injection 5,000 Units  Dose: 5,000 Units  Freq: Every 8 hours scheduled Route: SC  Start: 03/18/23 0000   Admin Instructions:   Check for allergies to pork or pork derivatives/dietary restrictions before administration  High alert medication  LOOK ALIKE SOUND ALIKE MED     0105 (1331) [C]     1250     1954      7207     1050     1900        insulin glargine (LANTUS) subcutaneous injection 25 Units 0 25 mL  Dose: 25 Units  Freq: Daily at bedtime Route: SC  Start: 03/18/23 2200   Admin Instructions:   HIGH ALERT MEDICATION   Do not dilute/mix insulin glargine with any other insulin formulation/solution  **DISPOSE IN 8 [employer]TAIN[employer]** Do not hold medication without a physician order  0003 [C]     2200        insulin glargine (LANTUS) subcutaneous injection 25 Units 0 25 mL  Dose: 25 Units  Freq: Daily at bedtime Route: SC  Start: 03/19/23 2200 End: 03/18/23 0109   Admin Instructions:   HIGH ALERT MEDICATION  Do not dilute/mix insulin glargine with any other insulin formulation/solution  **DISPOSE IN 8 [employer]TAINER** Do not hold medication without a physician order  0109-D/C'd       insulin glargine (LANTUS) subcutaneous injection 25 Units 0 25 mL  Dose: 25 Units  Freq: Daily at bedtime Route: SC  Start: 03/18/23 0000 End: 03/18/23 0108   Admin Instructions:   HIGH ALERT MEDICATION  Do not dilute/mix insulin glargine with any other insulin formulation/solution  **DISPOSE IN 8 [employer]TAINER** Do not hold medication without a physician order  0108-D/C'd  (0109)          insulin lispro (HumaLOG) 100 units/mL subcutaneous injection 1-6 Units  Dose: 1-6 Units  Freq: Every 6 hours scheduled Route: SC  Start: 03/18/23 0000   Admin Instructions:   Algorithm 3    Blood Glucose 150 - 189: 1 Unit of Insulin  Blood Glucose 190 - 229: 2 Units of Insulin  Blood Glucose 230 - 269: 3 Units of Insulin  Blood Glucose 270 - 309: 4 Units of Insulin  Blood Glucose 310 - 349: 5 Units of Insulin  Blood Glucose greater than or equal to 350: 6 Units of Insulin  HIGH ALERT MEDICATION  **DISPOSE IN 8 [employer] CONTAINER**  LOOK ALIKE [facility] ALI MED     (9239) (8811)     (2836)     (4425)      (5908)     (8413)     (2411)     1800        levothyroxine tablet 25 mcg  Dose: 25 mcg  Freq: Daily (early morning) Route: PO  Indications of Use: HYPOTHYROIDISM  Start: 03/19/23 1030   Admin Instructions:   Administer in the morning on an empty stomach, at least 30 to 60 minutes before food  Tablets may be crushed and suspended in 5-10mls of water for administration   LOOK ALIKE SOUND ALIKE MED  1050        lidocaine (LIDODERM) 5 % patch 1 patch  Dose: 1 patch  Freq: Daily Route: TP  Start: 03/19/23 0900   Admin Instructions:   Patch may remain in place for up to 12 hours in a 24hr period   Order specific questions:   What is the location for this topical application? to back      0836 2036        LORazepam (ATIVAN) injection 1 mg  Dose: 1 mg  Freq: Once Route: IV  Start: 03/18/23 1745 End: 03/18/23 1745   Admin Instructions:   High Alert Medication  LOOK ALIKE SOUND ALIKE MED     1745         morphine injection 4 mg  Dose: 4 mg  Freq: Once Route: IV  Start: 03/18/23 0015 End: 03/18/23 0014   Admin Instructions:   High alert medication  LOOK ALIKE SOUND ALIKE MED     0014 [C]         morphine injection 4 mg  Dose: 4 mg  Freq: Once Route: IV  Start: 03/17/23 2230 End: 03/17/23 2239   Admin Instructions:   High alert medication  LOOK ALIKE SOUND ALIKE MED    2239          morphine injection 4 mg  Dose: 4 mg  Freq: Once Route: IV  Start: 03/17/23 2115 End: 03/17/23 2119   Admin Instructions:   High alert medication  LOOK ALIKE SOUND ALIKE MED    2119          nicotine (NICODERM CQ) 14 mg/24hr TD 24 hr patch 1 patch  Dose: 1 patch  Freq: Daily Route: TD  Start: 03/18/23 0900   Admin Instructions:   Apply a new patch every 24 hours to a clean, dry, hairless site on the upper arm or hip  Hazardous agent; use appropriate precautions for handling and disposal  **DISPOSE EMPTY PACKAGING AND LEFTOVER/UNUSED MEDICATION IN 8 [employer]TAINER**      0827      0829     0836        nicotine (NICODERM CQ) 21 mg/24 hr TD 24 hr patch 1 patch  Dose: 1 patch  Freq: Daily Route: TD  Start: 03/19/23 1030 End: 03/19/23 1015   Admin Instructions:   Apply to upper body or arm    Hazardous agent; use appropriate precautions for handling and disposal  **DISPOSE EMPTY PACKAGING AND **DISPOSE EMPTY PACKAGING AND GALLON BLACK CONTAINER**       1015-D/C'd      ondansetron (ZOFRAN) injection 4 mg  Dose: 4 mg  Freq: Once Route: IV  Start: 03/17/23 2115 End: 03/17/23 2119   Admin Instructions:   Push over 2 minutes  2119          pantoprazole (PROTONIX) injection 40 mg  Dose: 40 mg  Freq: Every 12 hours scheduled Route: IV  Start: 03/18/23 0000   Admin Instructions:   Reconstitute with 10 mL 0 9% sodium chloride and administer as IVP over at least 2 minutes  Concentration = 4 mg/mL  LOOK ALIKE SOUND ALIKE [facility]     [id number]     8513     2003      0837 2100         potassium chloride 20 mEq IVPB (premix)  Dose: 20 mEq  Freq: Once Route: IV  Last Dose: 20 mEq (03/18/23 1427)  Start: 03/18/23 1345 End: 03/18/23 1627   Admin Instructions:   Patient must be on telemetry if rate is greater than 10 meq/hour  Peripheral administration not to exceed 20 meq/hour  High alert medication     1427         Followed by  potassium chloride 20 mEq IVPB (premix)  Dose: 20 mEq  Freq: Once Route: IV  Last Dose: 20 mEq (03/18/23 1818)  Start: 03/18/23 1545 End: 03/18/23 2018   Admin Instructions:   Patient must be on telemetry if rate is greater than 10 meq/hour  Peripheral administration not to exceed 20 meq/hour  High alert medication     1818              potassium chloride 40 mEq IVPB (premix)  Dose: 40 mEq  Freq: Once Route: IV  Start: 03/18/23 1330 End: 03/18/23 1333   Admin Instructions:   Patient must be on telemetry if rate is greater than 10 meq/hour  Must be administered via central line  High alert medication     1333-D/C'd  (1406)         sodium chloride 0 9 % bolus 1,000 mL  Dose: 1,000 mL  Freq: Once Route: IV  Last Dose: Stopped (03/18/23 0108)  Start: 03/17/23 2230 0108         sodium chloride 0 9 % bolus 1,000 mL  Dose: 1,000 mL  Freq: Once Route: IV  Last Dose: 1,000 mL (03/17/23 2119)  Start: 03/17/23 2115 End: 03/17/23 2219 2119          sodium phosphate-biphosphate (FLEET) enema 1 enema  Dose: 1 enema  Freq:  Once Route: RE  Start: 03/18/23 1330 End: 03/18/23 1429     1427         traZODone (MILLI) tablet 200 mg  Dose: 200 mg  Freq: Daily at bedtime Route: PO  Start: 03/19/23 2200   Admin Instructions:   LOOK ALIKE SOUND ALIKE MED      2200        Medications 03/17 03/18 03/19         Continuous Meds Sorted by Name  for Nataly Ellis as of 03/19/23 1409  Legend:                        Inactive     Active                 Medications 03/17 03/18 03/19   lactated ringers infusion  Rate: 100 mL/hr Dose: 100 mL/hr  Freq: Continuous Route: IV  Indications of Use: IV Hydration  Last Dose: 100 mL/hr (03/18/23 1121)  Start: 03/17/23 2345     0110     1121               PRN Meds Sorted by Name  for Nataly Ellis as of 03/19/23 1409  Legend:                 Inactive     Active                Medications 03/17 03/18 03/19   clonazePAM (KlonoPIN) tablet 0 5 mg  Dose: 0 5 mg  Freq: 2 times daily PRN Route: PO  PRN Reason: seizures  Start: 03/19/23 1015   Admin Instructions:   High alert medication  LOOK ALIKE SOUND ALIKE MED         hydrALAZINE (APRESOLINE) injection 5 mg  Dose: 5 mg  Freq: Every 6 hours PRN Route: IV  PRN Reason: high blood pressure  PRN Comment: SBP >170  Start: 03/17/23 2350   Admin Instructions:   Administer as slow IV push, 5 mg/min  LOOK ALIKE SOUND ALIKE MED   Order specific questions:   Hold for systolic blood pressure less than (mmHg) 110         HYDROmorphone (DILAUDID) injection 1 mg  Dose: 1 mg  Freq: Every 4 hours PRN Route: IV  PRN Comment: Breakthrough pain  Start: 03/18/23 1736   Admin Instructions:   High alert medication  LOOK ALIKE SOUND ALIKE MED     2001      0053     0514     0919     1404        iohexol (OMNIPAQUE) 350 MG/ML injection (SINGLE-DOSE) 100 mL  Dose: 100 mL  Freq:  Once in imaging Route: IV  PRN Reason: contrast  Start: 03/17/23 2202 End: 03/17/23 2227    2227          ketorolac (TORADOL) injection 15 mg  Dose: 15 mg  Freq: Every 6 hours PRN Route: IV  PRN Reason: severe pain  Start: 03/18/23 1321 End: 03/18/23 1747   Admin Instructions:   LOOK ALIKE SOUND ALIKE MED 1747-D/C'd       LORazepam (ATIVAN) injection 0 5 mg  Dose: 0 5 mg  Freq: Every 6 hours PRN Route: IV  PRN Reason: anxiety  Start: 03/18/23 0110 End: 03/18/23 1747   Admin Instructions:   High Alert Medication  LOOK ALIKE SOUND ALIKE MED     0128     1125     1747-D/C'd       LORazepam (ATIVAN) injection 1 mg  Dose: 1 mg  Freq: Every 4 hours PRN Route: IV  PRN Reason: agitation  Start: 03/18/23 1746   Admin Instructions:   High Alert Medication  LOOK ALIKE SOUND ALIKE MED      8685        metoclopramide (REGLAN) injection 10 mg  Dose: 10 mg  Freq: Every 6 hours PRN Route: IV  PRN Reason: nausea  Start: 03/18/23 1737 2003 0524        metoprolol (LOPRESSOR) injection 5 mg  Dose: 5 mg  Freq: Every 6 hours PRN Route: IV  PRN Reason: high blood pressure  PRN Comment: for HR MORE THAN 140  Start: 03/18/23 1824   Admin Instructions:   Hold for heart rate less than 50 beats per minute  Administer over 1 minute  LOOK ALIKE SOUND ALIKE MED     1834         morphine injection 2 mg  Dose: 2 mg  Freq: Every 4 hours PRN Route: IV  PRN Reason: severe pain  Start: 03/18/23 0005 End: 03/18/23 1824   Admin Instructions:   High alert medication  "[employer]"     0402     5334     6361     6600     1824-D/C'd       ondansetron (ZOFRAN) injection 4 mg  Dose: 4 mg  Freq: Every 6 hours PRN Route: IV  PRN Reasons: nausea,vomiting  Start: 03/18/23 0102 End: 03/18/23 1747   Admin Instructions:   Push over 2 minutes  0127     1024     1725     1747-D/C'd       SUMAtriptan (IMITREX) tablet 50 mg  Dose: 50 mg  Freq: Once as needed Route: PO  PRN Reason: migraine  Start: 03/19/23 1015   Admin Instructions: May repeat dose once in 2 hours if unresolved  Do not exceed 200 mg in 24 hours   LOOK ALIKE SOUND ALIKE MED               Network Utilization Review Department  ATTENTION: Please call with any questions or concerns to [phone] and carefully listen to the prompts so that you are directed to the right person  All voicemails are confidential   Benjaon Edgar all requests for admission clinical reviews, approved or denied determinations and any other requests to dedicated fax number below belonging to the campus where the patient is receiving treatment   List of dedicated fax numbers for the Facilities:  [address] DENIALS (Administrative/Medical Necessity) [phone]   [address] (Maternity/NICU/Pediatrics) [phone]   [address] [phone]   [facility] [phone]   [address] [address] [facility] [phone]   [address] [place] Fannie Avila [facility] 134 815 [facility] [phone]

## 2023-03-18 NOTE — ASSESSMENT & PLAN NOTE
· Presents with abdominal pain, distension, N/V progressing over past week  Notes last BM 5 days prior  Not passing flatus  Not improved with home Miralax and prune juice  · With admission for same in January  Constipation appears to continue to be driving factor of symptoms  Now on daily Miralax without improvement  Consider addition of Senna once taking PO  · Advised for GI follow-up and colonoscopy after discharge  · CT abd/pelvis: findings concerning for gastritis  Fluid-filled colon and multiple small bowel loops but no wall thickening or hyperemia suggestive of laxative use  · AFVSS  WBC 11 29  Tachycardia improving with IVF   Lactic 3 0  · NGT to LIS  · NPO/IVF  · IV Protonix for gastritis while NPO  · Case d/w general surg in ED recommending NGT placement  · General surg consult appreciated

## 2023-03-18 NOTE — H&P
Ching U  68   H&P- Mika Caceres 1965, 56 y o  female MRN: [id number]  Unit/Bed#: -01 Encounter: [id number]  Primary Care Provider: Jim Worley MD   Date and time admitted to hospital: 3/17/2023  8:37 PM    * Abdominal pain  Assessment & Plan  · Presents with abdominal pain, distension, N/V progressing over past week  Notes last BM 5 days prior  Not passing flatus  Not improved with home Miralax and prune juice  · With admission for same in January  Constipation appears to continue to be driving factor of symptoms  Now on daily Miralax without improvement  Consider addition of Senna once taking PO  · Advised for GI follow-up and colonoscopy after discharge  · CT abd/pelvis: findings concerning for gastritis  Fluid-filled colon and multiple small bowel loops but no wall thickening or hyperemia suggestive of laxative use  · AFVSS  WBC 11 29  Tachycardia improving with IVF  Lactic 3 0  · NGT to LIS  · NPO/IVF  · IV Protonix for gastritis while NPO  · Case d/w general surg in ED recommending NGT placement  · General surg consult appreciated     Cigarette smoker  Assessment & Plan  · NRT ordered    Diabetes mellitus type 2 in obese [facility])  Assessment & Plan  Lab Results   Component Value Date    HGBA1C 6 9 (H) 11/02/2022       No results for input(s): POCGLU in the last 72 hours  Blood Sugar Average: Last 72 hrs:     · Update A1c  · Hold metformin  · Continue lantus 25 units h s   · SSI and acu-checks q6 while NPO    Lactic acidosis  Assessment & Plan  · Lactic 3 0   AG 14  · Suspect due to dehydration with ongoing vomiting and decreased PO intake  · Continue IVF hydration  · Trend lactic/BMP    Generalized anxiety disorder  Assessment & Plan  · Hold home clonazepam, Abilify, Lexapro, trazodone while n p o   · IV Ativan as needed for now    Essential hypertension  Assessment & Plan  · Home HCTZ and Lopressor held while NPO  · IV hydralazine p r n    VTE Pharmacologic Prophylaxis: VTE Score: 3 heparin  Code Status: Level 1 - Full Code   Discussion with family: Patient declined call to   Anticipated Length of Stay: Patient will be admitted on an observation basis with an anticipated length of stay of less than 2 midnights secondary to abdominal pain and distension requiring NGT decompression and evaluation by specialists  Total Time Spent on Date of Encounter in care of patient: 55 minutes This time was spent on one or more of the following: performing physical exam; counseling and coordination of care; obtaining or reviewing history; documenting in the medical record; reviewing/ordering tests, medications or procedures; communicating with other healthcare professionals and discussing with patient's family/caregivers  Chief Complaint: abdominal pain    History of Present Illness:  Pardeep Whyte is a 56 y o  female with a PMH of IDDM, HTN, bipolar disorder, tobacco use, hypothyroidism who presents with abdominal pain, abdominal distention and nausea vomiting worsening over the past week  Also with constipation  Notes last normal bowel movement 5 to 7 days ago  She states she is not passing flatus but is able to belch  Notes prior history of SBO  States she was taking MiraLAX and prune juice at home without relief  Also reports decreased oral intake secondary to persistent nausea and vomiting  Also with epigastric abdominal pain  She denies any fevers, chills, chest pain, urinary symptoms, dizziness/lightheadedness  Patient was hospitalized in January for similar symptoms  Improved with NG tube, port of care and initiation of bowel regimen with MiraLAX  Was instructed to follow-up with GI for colonoscopy, stated she has not yet  In ED, patient noted with tachycardia  PVCs of 1 29  Lactate 3 0  CT abdomen pelvis obtained showing findings concerning for gastritis    Also with fluid-filled colon and multiple small bowel loops without wall thickening or hyperemia  Case was discussed with general surgery on-call who recommended admission to medicine with NG tube  Review of Systems:  Review of Systems   Constitutional: Positive for appetite change  Negative for activity change, chills and fever  HENT: Negative for congestion  Respiratory: Negative for shortness of breath  Cardiovascular: Negative for chest pain, palpitations and leg swelling  Gastrointestinal: Positive for abdominal distention, abdominal pain, constipation, nausea and vomiting  Negative for diarrhea  Endocrine: Negative for polyuria  Genitourinary: Negative for difficulty urinating and dysuria  Musculoskeletal: Negative for myalgias  Skin: Negative for rash  Neurological: Negative for weakness  Past Medical and Surgical History:   Past Medical History:   Diagnosis Date   • Addiction to drug [facility])    • Adjustment disorder    • Alcohol abuse    • Alcoholism ([facility] )    • Anxiety    • Bipolar disorder ([facility] )    • Bowel obstruction ([facility] )    • Depression    • Diabetes type 2, controlled ([place] [address] )    • Disease of thyroid gland    • Gastritis    • Head injury    • Heart palpitations    • Hyperlipidemia    • Hypertension    • Hypothyroidism    • Psychiatric illness    • PTSD (post-traumatic stress disorder)    • Seizure ([facility] )    • Seizures ([facility] )    • Sleep difficulties    • Substance abuse ([facility] )    • Suicide attempt ([facility] )    • Withdrawal symptoms, drug or narcotic ([facility] )        Past Surgical History:   Procedure Laterality Date   • ABDOMINAL SURGERY     • APPENDECTOMY     • BOWEL RESECTION     • CHOLECYSTECTOMY     • ESOPHAGOGASTRODUODENOSCOPY N/A 05/18/2018    Procedure: ESOPHAGOGASTRODUODENOSCOPY (EGD) with bx;  Surgeon: Randa Brooke DO;  Location: [facility]; Service: Gastroenterology   • HYSTERECTOMY     • KNEE SURGERY Bilateral 1991       Meds/Allergies:  Prior to Admission medications    Medication Sig Start Date End Date Taking?  Authorizing Provider   ARIPiprazole (ABILIFY) 10 mg tablet Take 1 tablet (10 mg total) by mouth daily 11/8/22  Yes Juan Jose Adan MD   atorvastatin (LIPITOR) 20 mg tablet Take 1 tablet (20 mg total) by mouth daily 1/25/23  Yes YONG Angulo   Blood Glucose Monitoring Suppl (OneTouch Verio Reflect) w/Device KIT May substitute brand based on insurance coverage  Check glucose TID  6/22/22  Yes Carlos Adams PA-C   clonazePAM (KlonoPIN) 0 5 mg tablet Take 1 tablet (0 5 mg total) by mouth 2 (two) times a day as needed for seizures 1/30/23  Yes YONG Arenas   ergocalciferol (VITAMIN D2) 50,000 units TAKE 1 CAPSULE BY MOUTH 1 TIME A WEEK 7/25/22  Yes Javier Cabral MD   escitalopram (LEXAPRO) 10 mg tablet TAKE 1 TABLET(10 MG) BY MOUTH DAILY 2/1/23  Yes Javier Cabral MD   gabapentin (NEURONTIN) 800 mg tablet Take 1 tablet (800 mg total) by mouth 3 (three) times a day 12/7/22  Yes Javier Cabral MD   hydrochlorothiazide (HYDRODIURIL) 12 5 mg tablet Take 1 tablet (12 5 mg total) by mouth daily 1/4/23  Yes Javier Cabral MD   insulin glargine (LANTUS) 100 units/mL subcutaneous injection Inject 25 Units under the skin daily at bedtime 11/8/22  Yes Juan Jose Adan MD   levothyroxine 25 mcg tablet Take 1 tablet (25 mcg total) by mouth daily in the early morning 1/4/23  Yes Javier Cabral MD   metFORMIN (GLUCOPHAGE) 1000 MG tablet TAKE 1 TABLET(1000 MG) BY MOUTH TWICE DAILY WITH MEALS 2/6/23  Yes Javier Cabral MD   metoprolol tartrate (LOPRESSOR) 50 mg tablet Take 1 tablet (50 mg total) by mouth every 12 (twelve) hours 11/14/22  Yes Javier Cabral MD   Microlet Lancets [address] [facility]  1/3/22  Yes Historical Provider, MD   nicotine (NICODERM CQ) 21 mg/24 hr TD 24 hr patch Place 1 patch on the skin over 24 hours daily 1/30/23  Yes YONG Greene   OneTouch Delica Lancets 06I MISC May substitute brand based on insurance coverage   Check glucose TID  6/22/22  Yes Carlos Adams PA-C   pantoprazole (PROTONIX) 40 mg tablet Take 1 tablet (40 mg total) by mouth daily 7/29/22  Yes Randy Colin MD   semaglutide, 1 mg/dose, (Ozempic) 4 mg/3 mL injection pen Inject 0 75 mL (1 mg total) under the skin every 7 days 2/13/23  Yes Randy Colin MD   traZODone (DESYREL) 100 mg tablet TAKE 2 TABLETS(200 MG) BY MOUTH DAILY AT BEDTIME 1/25/23  Yes Randy Colin MD   Alcohol Swabs 70 % PADS May substitute brand based on insurance coverage  Check glucose TID  6/22/22   Memo Porras PA-C   polyethylene glycol (MIRALAX) 17 g packet Take 17 g by mouth daily Do not start before January 28, 2023  Patient not taking: Reported on 3/17/2023 1/28/23   Mat Odonnell   SUMAtriptan (Imitrex) 50 mg tablet Take 1 tablet (50 mg total) by mouth once as needed for migraine for up to 1 dose  Patient not taking: Reported on 3/17/2023 2/13/23   Randy oClin MD     I have reviewed home medications with patient personally  Allergies:    Allergies   Allergen Reactions   • Losartan Cough   • Latex Rash       Social History:  Marital Status:    Occupation:   Patient Pre-hospital Living Situation: Home  Patient Pre-hospital Level of Mobility: walks  Patient Pre-hospital Diet Restrictions:   Substance Use History:   Social History     Substance and Sexual Activity   Alcohol Use Not Currently   • Alcohol/week: 6 0 standard drinks   • Types: 6 Cans of beer per week    Comment: last drink on 08/15/20     Social History     Tobacco Use   Smoking Status Every Day   • Packs/day: 0 50   • Years: 25 00   • Pack years: 12 50   • Types: Cigarettes   • Passive exposure: Current   Smokeless Tobacco Never   Tobacco Comments    smokes like 5 a day(06/24/2022)     Social History     Substance and Sexual Activity   Drug Use Never       Family History:  Family History   Problem Relation Age of Onset   • Diabetes Father    • Bipolar disorder Mother        Physical Exam:     Vitals:   Blood Pressure: 121/70 "(03/18/23 0053)  Pulse: 96 (03/18/23 0053)  Temperature: 98 °F (36 7 °C) (03/18/23 0053)  Temp Source: Tympanic (03/18/23 0053)  Respirations: 16 (03/18/23 0053)  Height: 5' 3\" (160 cm) (03/17/23 2028)  Weight - Scale: 78 4 kg (172 lb 12 8 oz) (03/18/23 0053)  SpO2: 95 % (03/18/23 0053)    Physical Exam  Vitals and nursing note reviewed  Constitutional:       General: She is not in acute distress  Appearance: She is well-developed  HENT:      Head: Normocephalic and atraumatic  Mouth/Throat:      Mouth: Mucous membranes are moist    Eyes:      Extraocular Movements: Extraocular movements intact  Pupils: Pupils are equal, round, and reactive to light  Cardiovascular:      Rate and Rhythm: Regular rhythm  Tachycardia present  Pulses: Normal pulses  Heart sounds: Normal heart sounds  No murmur heard  Pulmonary:      Effort: Pulmonary effort is normal  No respiratory distress  Breath sounds: Normal breath sounds  No wheezing or rales  Abdominal:      General: Abdomen is flat  There is distension  Palpations: Abdomen is soft  Tenderness: There is abdominal tenderness  Comments: Hyperactive bowel sounds  Tenderness palpation in right upper quadrant, epigastric, left upper quadrant  Musculoskeletal:      Right lower leg: No edema  Left lower leg: No edema  Skin:     General: Skin is warm and dry  Neurological:      General: No focal deficit present  Mental Status: She is alert and oriented to person, place, and time  Cranial Nerves: No cranial nerve deficit     Psychiatric:         Mood and Affect: Mood normal          Behavior: Behavior normal          Additional Data:   Lab Results:  Results from last 7 days   Lab Units 03/17/23 2123   WBC Thousand/uL 11 29*   HEMOGLOBIN g/dL 14 5   HEMATOCRIT % 42 4   PLATELETS Thousands/uL 434*   NEUTROS PCT % 60   LYMPHS PCT % 28   MONOS PCT % 11   EOS PCT % 1     Results from last 7 days   Lab Units " 03/17/23  2123   SODIUM mmol/L 137   POTASSIUM mmol/L 3 5   CHLORIDE mmol/L 99   CO2 mmol/L 24   BUN mg/dL 4*   CREATININE mg/dL 0 69   ANION GAP mmol/L 14*   CALCIUM mg/dL 10 5*   ALBUMIN g/dL 4 7   TOTAL BILIRUBIN mg/dL 0 50   ALK PHOS U/L 80   ALT U/L 21   AST U/L 15   GLUCOSE RANDOM mg/dL 161*         Results from last 7 days   Lab Units 03/18/23  0107   POC GLUCOSE mg/dl 85         Results from last 7 days   Lab Units 03/17/23  2123   LACTIC ACID mmol/L 3 0*       Lines/Drains:  Invasive Devices     Peripheral Intravenous Line  Duration           Peripheral IV 03/17/23 Right Antecubital <1 day          Drain  Duration           NG/OG/Enteral Tube Nasogastric 16 Fr Right nare <1 day                    Imaging: Reviewed radiology reports from this admission including: abdominal/pelvic CT  CT abdomen pelvis with contrast   Final Result by Kane Sanders MD (03/17 2310)      Findings concerning for gastritis  Recommend correlation with patient's symptoms  Fluid-filled colon and multiple small bowel loops but no wall thickening or hyperemia suggestive of laxative use  Workstation performed: [id number]             EKG and Other Studies Reviewed on Admission:   · EKG: Pending at time of admission    ** Please Note: This note has been constructed using a voice recognition system   **

## 2023-03-18 NOTE — PLAN OF CARE
Problem: GASTROINTESTINAL - ADULT  Goal: Minimal or absence of nausea and/or vomiting  Description: INTERVENTIONS:  - Administer IV fluids if ordered to ensure adequate hydration  - Maintain NPO status until nausea and vomiting are resolved  - Nasogastric tube if ordered  - Administer ordered antiemetic medications as needed  - Provide nonpharmacologic comfort measures as appropriate  - Advance diet as tolerated, if ordered  - Consider nutrition services referral to assist patient with adequate nutrition and appropriate food choices  Outcome: Progressing  Goal: Maintains or returns to baseline bowel function  Description: INTERVENTIONS:  - Assess bowel function  - Encourage oral fluids to ensure adequate hydration  - Administer IV fluids if ordered to ensure adequate hydration  - Administer ordered medications as needed  - Encourage mobilization and activity  - Consider nutritional services referral to assist patient with adequate nutrition and appropriate food choices  Outcome: Progressing  Goal: Maintains adequate nutritional intake  Description: INTERVENTIONS:  - Monitor percentage of each meal consumed  - Identify factors contributing to decreased intake, treat as appropriate  - Assist with meals as needed  - Monitor I&O, weight, and lab values if indicated  - Obtain nutrition services referral as needed  Outcome: Progressing     Problem: PAIN - ADULT  Goal: Verbalizes/displays adequate comfort level or baseline comfort level  Description: Interventions:  - Encourage patient to monitor pain and request assistance  - Assess pain using appropriate pain scale  - Administer analgesics based on type and severity of pain and evaluate response  - Implement non-pharmacological measures as appropriate and evaluate response  - Consider cultural and social influences on pain and pain management  - Notify physician/advanced practitioner if interventions unsuccessful or patient reports new pain  Outcome: Progressing Problem: INFECTION - ADULT  Goal: Absence or prevention of progression during hospitalization  Description: INTERVENTIONS:  - Assess and monitor for signs and symptoms of infection  - Monitor lab/diagnostic results  - Administer medications as ordered  - Instruct and encourage patient and family to use good hand hygiene technique  Outcome: Progressing  Goal: Absence of fever/infection during neutropenic period  Description: INTERVENTIONS:  - Monitor WBC    Outcome: Progressing     Problem: SAFETY ADULT  Goal: Patient will remain free of falls  Description: INTERVENTIONS:  - Educate patient/family on patient safety including physical limitations  - Instruct patient to call for assistance with activity   - Consult OT/PT to assist with strengthening/mobility   - Keep Call bell within reach  - Keep bed low and locked with side rails adjusted as appropriate  - Keep care items and personal belongings within reach  - Initiate and maintain comfort rounds  - Offer Toileting every 2  Hours, in advance of need  Outcome: Progressing  Goal: Maintain or return to baseline ADL function  Description: INTERVENTIONS:  -  Assess patient's ability to carry out ADLs; assess patient's baseline for ADL function and identify physical deficits which impact ability to perform ADLs (bathing, care of mouth/teeth, toileting, grooming, dressing, etc )  - Assess/evaluate cause of self-care deficits   - Assess range of motion  - Assess patient's mobility; develop plan if impaired  - Assess patient's need for assistive devices and provide as appropriate  - Encourage maximum independence but intervene and supervise when necessary  - Involve family in performance of ADLs  - Assess for home care needs following discharge   - Consider OT consult to assist with ADL evaluation and planning for discharge  - Provide patient education as appropriate  Outcome: Progressing  Goal: Maintains/Returns to pre admission functional level  Description: INTERVENTIONS:  - Perform BMAT or MOVE assessment daily    - Set and communicate daily mobility goal to care team and patient/family/caregiver  - Collaborate with rehabilitation services on mobility goals if consulted  - Perform Range of Motion 3 times a day  - Dangle patient  3  times a day  - Stand patient  3  times a day  - Ambulate patient 3 times a day  - Out of bed to chair 3  times a day   - Out of bed for meals  3  times a day  - Out of bed for toileting  - Record patient progress and toleration of activity level   Outcome: Progressing     Problem: DISCHARGE PLANNING  Goal: Discharge to home or other facility with appropriate resources  Description: INTERVENTIONS:  - Identify barriers to discharge w/patient and caregiver  - Arrange for needed discharge resources and transportation as appropriate  - Identify discharge learning needs (meds, wound care, etc )  - Arrange for interpretive services to assist at discharge as needed  - Refer to Case Management Department for coordinating discharge planning if the patient needs post-hospital services based on physician/advanced practitioner order or complex needs related to functional status, cognitive ability, or social support system  Outcome: Progressing     Problem: Knowledge Deficit  Goal: Patient/family/caregiver demonstrates understanding of disease process, treatment plan, medications, and discharge instructions  Description: Complete learning assessment and assess knowledge base    Interventions:  - Provide teaching at level of understanding  - Provide teaching via preferred learning methods  Outcome: Progressing

## 2023-03-18 NOTE — ASSESSMENT & PLAN NOTE
· Lactic 3 0   AG 14  · Suspect due to dehydration with ongoing vomiting and decreased PO intake  · Continue IVF hydration  · Trend lactic/BMP

## 2023-03-18 NOTE — ED PROVIDER NOTES
History  Chief Complaint   Patient presents with   • Abdominal Pain     Pt has nausea and states she has not pooped in about a week  She been taking laxatives  Pt stated she vomited yesterday  Patient is a 45-year-old female seen in the emergency department with concern for generalized abdominal pain, constipation, nausea  Patient notes no relief with prune juice and laxative medication at home  Patient notes history of multiple intestinal surgeries and a hysterectomy in the past   Patient notes no fever, chest pain, shortness of breath, weakness  Patient states that she has not had a normal bowel movement in approximately 5 days  Patient states that she has not been passing gas  Prior to Admission Medications   Prescriptions Last Dose Informant Patient Reported? Taking? ARIPiprazole (ABILIFY) 10 mg tablet   No Yes   Sig: Take 1 tablet (10 mg total) by mouth daily   Alcohol Swabs 70 % PADS   No No   Sig: May substitute brand based on insurance coverage  Check glucose TID  Blood Glucose Monitoring Suppl (OneTouch Verio Reflect) w/Device KIT   No Yes   Sig: May substitute brand based on insurance coverage  Check glucose TID  Microlet Lancets MISC   Yes Yes   OneTouch Delica Lancets 82A MISC   No Yes   Sig: May substitute brand based on insurance coverage  Check glucose TID     SUMAtriptan (Imitrex) 50 mg tablet Not Taking  No No   Sig: Take 1 tablet (50 mg total) by mouth once as needed for migraine for up to 1 dose   Patient not taking: Reported on 3/17/2023   atorvastatin (LIPITOR) 20 mg tablet   No Yes   Sig: Take 1 tablet (20 mg total) by mouth daily   clonazePAM (KlonoPIN) 0 5 mg tablet   No Yes   Sig: Take 1 tablet (0 5 mg total) by mouth 2 (two) times a day as needed for seizures   ergocalciferol (VITAMIN D2) 50,000 units   No Yes   Sig: TAKE 1 CAPSULE BY MOUTH 1 TIME A WEEK   escitalopram (LEXAPRO) 10 mg tablet   No Yes   Sig: TAKE 1 TABLET(10 MG) BY MOUTH DAILY   gabapentin (NEURONTIN) 800 mg tablet   No Yes   Sig: Take 1 tablet (800 mg total) by mouth 3 (three) times a day   hydrochlorothiazide (HYDRODIURIL) 12 5 mg tablet   No Yes   Sig: Take 1 tablet (12 5 mg total) by mouth daily   insulin glargine (LANTUS) 100 units/mL subcutaneous injection   No Yes   Sig: Inject 25 Units under the skin daily at bedtime   levothyroxine 25 mcg tablet   No Yes   Sig: Take 1 tablet (25 mcg total) by mouth daily in the early morning   metFORMIN (GLUCOPHAGE) 1000 MG tablet   No Yes   Sig: TAKE 1 TABLET(1000 MG) BY MOUTH TWICE DAILY WITH MEALS   metoprolol tartrate (LOPRESSOR) 50 mg tablet   No Yes   Sig: Take 1 tablet (50 mg total) by mouth every 12 (twelve) hours   nicotine (NICODERM CQ) 21 mg/24 hr TD 24 hr patch   No Yes   Sig: Place 1 patch on the skin over 24 hours daily   pantoprazole (PROTONIX) 40 mg tablet   No Yes   Sig: Take 1 tablet (40 mg total) by mouth daily   polyethylene glycol (MIRALAX) 17 g packet Not Taking  No No   Sig: Take 17 g by mouth daily Do not start before January 28, 2023     Patient not taking: Reported on 3/17/2023   semaglutide, 1 mg/dose, (Ozempic) 4 mg/3 mL injection pen   No Yes   Sig: Inject 0 75 mL (1 mg total) under the skin every 7 days   traZODone (DESYREL) 100 mg tablet   No Yes   Sig: TAKE 2 TABLETS(200 MG) BY MOUTH DAILY AT BEDTIME      Facility-Administered Medications: None       Past Medical History:   Diagnosis Date   • Addiction to drug [facility])    • Adjustment disorder    • Alcohol abuse    • Alcoholism ([place] [address] )    • Anxiety    • Bipolar disorder ([place] [address] )    • Bowel obstruction ([place] [address] )    • Depression    • Diabetes type 2, controlled ([place] [address] )    • Disease of thyroid gland    • Gastritis    • Head injury    • Heart palpitations    • Hyperlipidemia    • Hypertension    • Hypothyroidism    • Psychiatric illness    • PTSD (post-traumatic stress disorder)    • Seizure ([place] [address] )    • Seizures ([place] [address] )    • Sleep difficulties    • Substance abuse ([place] [address] )    • Suicide attempt ([place] [address] )    • Withdrawal symptoms, drug or narcotic ([facility] [address] )        Past Surgical History:   Procedure Laterality Date   • ABDOMINAL SURGERY     • APPENDECTOMY     • BOWEL RESECTION     • CHOLECYSTECTOMY     • ESOPHAGOGASTRODUODENOSCOPY N/A 05/18/2018    Procedure: ESOPHAGOGASTRODUODENOSCOPY (EGD) with bx;  Surgeon: Frederick Collins DO;  Location: [facility]; Service: Gastroenterology   • HYSTERECTOMY     • KNEE SURGERY Bilateral 1991       Family History   Problem Relation Age of Onset   • Diabetes Father    • Bipolar disorder Mother      I have reviewed and agree with the history as documented  E-Cigarette/Vaping   • E-Cigarette Use Never User      E-Cigarette/Vaping Substances   • Nicotine No    • THC No    • CBD No    • Flavoring No    • Other No    • Unknown No      Social History     Tobacco Use   • Smoking status: Every Day     Packs/day: 0 50     Years: 25 00     Pack years: 12 50     Types: Cigarettes     Passive exposure: Current   • Smokeless tobacco: Never   • Tobacco comments:     smokes like 5 a day(06/24/2022)   Vaping Use   • Vaping Use: Never used   Substance Use Topics   • Alcohol use: Not Currently     Alcohol/week: 6 0 standard drinks     Types: 6 Cans of beer per week     Comment: last drink on 08/15/20   • Drug use: Never       Review of Systems   Constitutional: Negative for chills and fever  HENT: Negative for ear pain and sore throat  Eyes: Negative for pain and visual disturbance  Respiratory: Negative for cough and shortness of breath  Cardiovascular: Negative for chest pain and palpitations  Gastrointestinal: Positive for abdominal pain, constipation and nausea  Negative for vomiting  Genitourinary: Negative for decreased urine volume and difficulty urinating  Musculoskeletal: Negative for gait problem and neck stiffness  Skin: Negative for color change and rash  Neurological: Negative for seizures and syncope  Psychiatric/Behavioral: Negative for agitation and confusion     All other systems reviewed and are negative  Physical Exam  Physical Exam  Vitals and nursing note reviewed  Constitutional:       Appearance: She is well-developed  Comments: appears uncomfortable   HENT:      Head: Normocephalic and atraumatic  Right Ear: External ear normal       Left Ear: External ear normal       Nose: Nose normal       Mouth/Throat:      Pharynx: Oropharynx is clear  Eyes:      General: No scleral icterus  Conjunctiva/sclera: Conjunctivae normal    Cardiovascular:      Rate and Rhythm: Regular rhythm  Tachycardia present  Heart sounds: No murmur heard  Pulmonary:      Effort: Pulmonary effort is normal  No respiratory distress  Breath sounds: Normal breath sounds  Abdominal:      General: There is distension  Palpations: Abdomen is soft  Tenderness: There is abdominal tenderness  Comments: mild tenderness to palpation diffusely   Genitourinary:     Comments: Rectal exam(chaperoned by RN)-no external lesions or ulcerations; normal tone; no stool noted  Musculoskeletal:         General: No deformity or signs of injury  Cervical back: Normal range of motion and neck supple  Skin:     General: Skin is warm and dry  Neurological:      General: No focal deficit present  Mental Status: She is alert  Cranial Nerves: No cranial nerve deficit  Sensory: No sensory deficit  Psychiatric:         Mood and Affect: Mood normal          Thought Content:  Thought content normal          Vital Signs  ED Triage Vitals   Temperature Pulse Respirations Blood Pressure SpO2   03/17/23 2028 03/17/23 2028 03/17/23 2041 03/17/23 2029 03/17/23 2028   98 4 °F (36 9 °C) (!) 115 18 (!) 148/101 100 %      Temp Source Heart Rate Source Patient Position - Orthostatic VS BP Location FiO2 (%)   03/17/23 2028 03/17/23 2028 03/17/23 2028 03/17/23 2028 --   Oral Monitor Sitting Left arm       Pain Score       03/17/23 2028       7           Vitals:    03/17/23 2028 03/17/23 2029 03/17/23 2237   BP:  (!) 148/101 118/71   Pulse: (!) 115  99   Patient Position - Orthostatic VS: Sitting  Lying         Visual Acuity      ED Medications  Medications   sodium chloride 0 9 % bolus 1,000 mL (has no administration in time range)   sodium chloride 0 9 % bolus 1,000 mL (1,000 mL Intravenous New Bag 3/17/23 2119)   morphine injection 4 mg (4 mg Intravenous Given 3/17/23 2119)   ondansetron (ZOFRAN) injection 4 mg (4 mg Intravenous Given 3/17/23 2119)   iohexol (OMNIPAQUE) 350 MG/ML injection (SINGLE-DOSE) 100 mL (100 mL Intravenous Given 3/17/23 2227)   morphine injection 4 mg (4 mg Intravenous Given 3/17/23 2239)       Diagnostic Studies  Results Reviewed     Procedure Component Value Units Date/Time    Lactic acid, plasma [[id number]]  (Abnormal) Collected: 03/17/23 2123    Lab Status: Final result Specimen: Blood from Arm, Right Updated: 03/17/23 2206     LACTIC ACID 3 0 mmol/L     Narrative:      Result may be elevated if tourniquet was used during collection      Lactic acid 2 Hours [[id number]]     Lab Status: No result Specimen: Blood     Comprehensive metabolic panel [[id number]]  (Abnormal) Collected: 03/17/23 2123    Lab Status: Final result Specimen: Blood from Arm, Right Updated: 03/17/23 2151     Sodium 137 mmol/L      Potassium 3 5 mmol/L      Chloride 99 mmol/L      CO2 24 mmol/L      ANION GAP 14 mmol/L      BUN 4 mg/dL      Creatinine 0 69 mg/dL      Glucose 161 mg/dL      Calcium 10 5 mg/dL      AST 15 U/L      ALT 21 U/L      Alkaline Phosphatase 80 U/L      Total Protein 8 4 g/dL      Albumin 4 7 g/dL      Total Bilirubin 0 50 mg/dL      eGFR 98 ml/min/1 73sq m     Narrative:      Christina guidelines for Chronic Kidney Disease (CKD):   •  Stage 1 with normal or high GFR (GFR > 90 mL/min/1 73 square meters)  •  Stage 2 Mild CKD (GFR = 60-89 mL/min/1 73 square meters)  •  Stage 3A Moderate CKD (GFR = 45-59 mL/min/1 73 square meters)  •  Stage 3B Moderate CKD (GFR = 30-44 mL/min/1 73 square meters)  •  Stage 4 Severe CKD (GFR = 15-29 mL/min/1 73 square meters)  •  Stage 5 End Stage CKD (GFR <15 mL/min/1 73 square meters)  Note: GFR calculation is accurate only with a steady state creatinine    Magnesium [[id number]]  (Normal) Collected: 03/17/23 2123    Lab Status: Final result Specimen: Blood from Arm, Right Updated: 03/17/23 2151     Magnesium 2 0 mg/dL     Lipase [[id number]]  (Abnormal) Collected: 03/17/23 2123    Lab Status: Final result Specimen: Blood from Arm, Right Updated: 03/17/23 2151     Lipase 6 u/L     UA w Reflex to Microscopic w Reflex to Culture [[id number]]  (Normal) Collected: 03/17/23 2142    Lab Status: Final result Specimen: Urine, Clean Catch Updated: 03/17/23 2149     Color, UA Yellow     Clarity, UA Clear     Specific Gravity, UA <=1 005     pH, UA 6 0     Leukocytes, UA Negative     Nitrite, UA Negative     Protein, UA Negative mg/dl      Glucose, UA Negative mg/dl      Ketones, UA Negative mg/dl      Urobilinogen, UA 0 2 E U /dl      Bilirubin, UA Negative     Occult Blood, UA Negative    CBC and differential [[id number]]  (Abnormal) Collected: 03/17/23 2123    Lab Status: Final result Specimen: Blood from Arm, Right Updated: 03/17/23 2132     WBC 11 29 Thousand/uL      RBC 4 84 Million/uL      Hemoglobin 14 5 g/dL      Hematocrit 42 4 %      MCV 88 fL      MCH 30 0 pg      MCHC 34 2 g/dL      RDW 13 7 %      MPV 9 8 fL      Platelets 949 Thousands/uL      nRBC 0 /100 WBCs      Neutrophils Relative 60 %      Immat GRANS % 0 %      Lymphocytes Relative 28 %      Monocytes Relative 11 %      Eosinophils Relative 1 %      Basophils Relative 0 %      Neutrophils Absolute 6 70 Thousands/µL      Immature Grans Absolute 0 03 Thousand/uL      Lymphocytes Absolute 3 21 Thousands/µL      Monocytes Absolute 1 24 Thousand/µL      Eosinophils Absolute 0 07 Thousand/µL      Basophils Absolute 0 04 Thousands/µL                  CT abdomen pelvis with contrast   Final Result by Jamie Burgos MD (03/17 2310)      Findings concerning for gastritis  Recommend correlation with patient's symptoms  Fluid-filled colon and multiple small bowel loops but no wall thickening or hyperemia suggestive of laxative use  Workstation performed: [id number]                    Procedures  Procedures         ED Course  ED Course as of 03/17/23 2332   Fri Mar 17, 2023   2314 CT abdomen/pelvis-    IMPRESSION:     Findings concerning for gastritis  Recommend correlation with patient's symptoms      Fluid-filled colon and multiple small bowel loops but no wall thickening or hyperemia suggestive of laxative use                                                    Medical Decision Making  Patient is a 57-year-old female seen in the emergency department with concern for abdominal pain, constipation, nausea  Patient was treated with medication for symptom control  Laboratory evaluation remarkable for mildly elevated anion gap of 14, elevated glucose of 161, elevated calcium of 10 5, elevated white blood cell count of 11 29, elevated platelets of 833, elevated lactic acid of 3 0  CT abdomen/pelvis was obtained to evaluate for small bowel obstruction, hernia, or other acute intra-abdominal abnormality  CT imaging showed findings concerning for gastritis, fluid-filled colon and multiple small bowel loops but no wall thickening or hyperemia  Case was reviewed with surgery(Dr Chanel Arreola), with recommendation for NG tube, observation to medicine for further evaluation and treatment  Case was reviewed with medicine team   Plan of care was reviewed with patient  Abdominal pain: acute illness or injury  Constipation: acute illness or injury  Elevated lactic acid level: acute illness or injury  Amount and/or Complexity of Data Reviewed  Labs: ordered  Decision-making details documented in ED Course  Radiology: ordered   Decision-making details documented in ED Course  Risk  Prescription drug management  Decision regarding hospitalization  Disposition  Final diagnoses:   Abdominal pain   Elevated lactic acid level   Constipation     Time reflects when diagnosis was documented in both MDM as applicable and the Disposition within this note     Time User Action Codes Description Comment    3/17/2023 11:23 PM William Bain [R10 9] Abdominal pain     3/17/2023 11:23 PM William Bain [R79 [id number]] Elevated lactic acid level     3/17/2023 11:23 PM William Bain [K59 00] Constipation       ED Disposition     ED Disposition   Admit    Condition   Stable    Date/Time   Fri Mar 17, 2023 11:29 PM    Comment   Case was reviewed with medicine team, and the patient's admission status was agreed to be Admission Status: observation status to the service of Dr Ama Valdivia  Follow-up Information    None         Patient's Medications   Discharge Prescriptions    No medications on file       No discharge procedures on file      PDMP Review       Value Time User    PDMP Reviewed  Yes 1/4/2023  3:14 PM Dinesh Aquino MD          ED Provider  Electronically Signed by           Cherelle Huerta MD  03/17/23 4154

## 2023-03-18 NOTE — PLAN OF CARE
Problem: GASTROINTESTINAL - ADULT  Goal: Minimal or absence of nausea and/or vomiting  Description: INTERVENTIONS:  - Administer IV fluids if ordered to ensure adequate hydration  - Maintain NPO status until nausea and vomiting are resolved  - Nasogastric tube if ordered  - Administer ordered antiemetic medications as needed  - Provide nonpharmacologic comfort measures as appropriate  - Advance diet as tolerated, if ordered  - Consider nutrition services referral to assist patient with adequate nutrition and appropriate food choices  Outcome: Progressing  Goal: Maintains adequate nutritional intake  Description: INTERVENTIONS:  - Monitor percentage of each meal consumed  - Identify factors contributing to decreased intake, treat as appropriate  - Assist with meals as needed  - Monitor I&O, weight, and lab values if indicated  - Obtain nutrition services referral as needed  Outcome: Progressing     Problem: PAIN - ADULT  Goal: Verbalizes/displays adequate comfort level or baseline comfort level  Description: Interventions:  - Encourage patient to monitor pain and request assistance  - Assess pain using appropriate pain scale  - Administer analgesics based on type and severity of pain and evaluate response  - Implement non-pharmacological measures as appropriate and evaluate response  - Consider cultural and social influences on pain and pain management  - Notify physician/advanced practitioner if interventions unsuccessful or patient reports new pain  Outcome: Progressing     Problem: GASTROINTESTINAL - ADULT  Goal: Maintains or returns to baseline bowel function  Description: INTERVENTIONS:  - Assess bowel function  - Encourage oral fluids to ensure adequate hydration  - Administer IV fluids if ordered to ensure adequate hydration  - Administer ordered medications as needed  - Encourage mobilization and activity  - Consider nutritional services referral to assist patient with adequate nutrition and appropriate food choices  Outcome: Not Progressing

## 2023-03-18 NOTE — CONSULTS
Consultation - General Surgery   Ramon Elliott 56 y o  female MRN: [id number]  Unit/Bed#: -01 Encounter: [id number]: Jim Patel MD      Physician Requesting Consult: Fan Umana*  Reason for Consult / Principal Problem:   Abdominal pain nausea vomiting  Constipation  No flatus no bowel movement  Gastritis on CT scan  Fluid-filled small bowel and large bowel    Chief Complaint:   Constipation/abdominal pain/abdominal distention    HPI:  Ramon Elliott is a 56 y o  female who presents with multiple abdominal surgery in past  Duple psych medications for bipolar disorder insulin-dependent type 2 diabetes mellitus and hypertension and obesity  Developed some abdominal distention and nausea for last 1 week  Has chronic history of chronic constipation and has been taking multiple laxatives with no improvement she tried MiraLAX prune juice  Colace    Has similar symptoms and was hospitalized in January    No flatus no bowel movement for last 5 days  With CT scan in ER revealed distended small bowel and large bowel with fluid-filled colon up to the rectal orifice  Patient never had a colonoscopy    Historical Information   Past Medical History:   Diagnosis Date   • Addiction to drug [facility])    • Adjustment disorder    • Alcohol abuse    • Alcoholism ([facility] )    • Anxiety    • Bipolar disorder ([facility] )    • Bowel obstruction ([facility] )    • Depression    • Diabetes type 2, controlled ([facility] )    • Disease of thyroid gland    • Gastritis    • Head injury    • Heart palpitations    • Hyperlipidemia    • Hypertension    • Hypothyroidism    • Psychiatric illness    • PTSD (post-traumatic stress disorder)    • Seizure ([facility] )    • Seizures ([facility] )    • Sleep difficulties    • Substance abuse ([facility] )    • Suicide attempt ([facility] )    • Withdrawal symptoms, drug or narcotic ([facility] )      Past Surgical History:   Procedure Laterality Date   • ABDOMINAL SURGERY     • APPENDECTOMY     • BOWEL RESECTION     • CHOLECYSTECTOMY     • ESOPHAGOGASTRODUODENOSCOPY N/A 05/18/2018    Procedure: ESOPHAGOGASTRODUODENOSCOPY (EGD) with bx;  Surgeon: Allison Ponce DO;  Location: [facility];   Service: Gastroenterology   • HYSTERECTOMY     • KNEE SURGERY Bilateral 1991     Social History   Social History     Substance and Sexual Activity   Alcohol Use Not Currently   • Alcohol/week: 6 0 standard drinks   • Types: 6 Cans of beer per week    Comment: last drink on 08/15/20     Social History     Substance and Sexual Activity   Drug Use Never     Social History     Tobacco Use   Smoking Status Every Day   • Packs/day: 0 50   • Years: 25 00   • Pack years: 12 50   • Types: Cigarettes   • Passive exposure: Current   Smokeless Tobacco Never   Tobacco Comments    smokes like 5 a day(06/24/2022)     Family History:   Family History   Problem Relation Age of Onset   • Diabetes Father    • Bipolar disorder Mother        Meds/Allergies     Current Facility-Administered Medications:   •  heparin (porcine) subcutaneous injection 5,000 Units, 5,000 Units, Subcutaneous, Q8H [address], SOO [place]-C, 5,000 Units at 03/18/23 1024  •  hydrALAZINE (APRESOLINE) injection 5 mg, 5 mg, Intravenous, Q6H PRN, Sarah Smith PA-C  •  insulin glargine (LANTUS) subcutaneous injection 25 Units 0 25 mL, 25 Units, Subcutaneous, HS, Sarah Smith PA-C  •  insulin lispro (HumaLOG) 100 units/mL subcutaneous injection 1-6 Units, 1-6 Units, Subcutaneous, Q6H [address] **AND** Fingerstick Glucose (POCT), , , Q6H, Sarah Smith PA-C  •  lactated ringers infusion, 100 mL/hr, Intravenous, Continuous, SOO [place]-C, Last Rate: 100 mL/hr at 03/18/23 1121, 100 mL/hr at 03/18/23 1121  •  LORazepam (ATIVAN) injection 0 5 mg, 0 5 mg, Intravenous, Q6H PRN, Vickie Salcedo PA-C, 0 5 mg at 03/18/23 1125  •  morphine injection 2 mg, 2 mg, Intravenous, Q4H PRN, Vickierossana Salcedo PA-C, 2 mg at 03/18/23 0842  •  nicotine ([facility]) "14 mg/24hr TD 24 hr patch 1 patch, 1 patch, Transdermal, Daily, Sarah Chan PA-C, 1 patch at 03/18/23 0827  •  ondansetron [facility]) injection 4 mg, 4 mg, Intravenous, Q6H PRN, Sarah Chan PA-C, 4 mg at 03/18/23 1024  •  pantoprazole (PROTONIX) injection 40 mg, 40 mg, Intravenous, Q12H [address], Sarah Chan PA-C, 40 mg at 03/18/23 0827  •  sodium chloride 0 9 % bolus 1,000 mL, 1,000 mL, Intravenous, Once, Oli Phillips MD, Held at 03/18/23 0108   Allergies   Allergen Reactions   • Losartan Cough   • Latex Rash       REVIEW OF SYSTEMS  Constitutional:  Denies fever or chills has poor p o  appetite  Eyes:  Denies change in visual acuity   HENT:  Denies nasal congestion or sore throat   Respiratory:  Denies cough or shortness of breath   Cardiovascular:  Denies chest pain or edema   GI: Complaning of abdominal pain, nausea, vomiting, severe constipation for 5 days   Passing flatus but no bowel movement patient tried multiple laxatives with no improvement   No history of bloody stools or diarrhea history of colon cancer in the family  Ever had colonoscopy  :  Denies dysuria, frequency, difficulty in micturition and nocturia  Musculoskeletal:  Denies back pain or joint pain    Neurologic:  Denies headache, focal weakness or sensory changes   Endocrine:  Denies polyuria or polydipsia is insulin-dependent type 2 diabetes mellitus  Lymphatic:  Denies swollen glands   Psychiatric: On meds for bipolar disorder    Objective   PHYSICAL EXAMS  Current Vitals:   Blood Pressure: 133/75 (03/18/23 0751)  Pulse: 97 (03/18/23 0751)  Temperature: 98 4 °F (36 9 °C) (03/18/23 0751)  Temp Source: Tympanic (03/18/23 0751)  Respirations: 16 (03/18/23 0751)  Height: 5' 3\" (160 cm) (03/17/23 2028)  Weight - Scale: 78 4 kg (172 lb 12 8 oz) (03/18/23 0053)  SpO2: 95 % (03/18/23 0053) Body mass index is 30 61 kg/m²     I/Os:I/O last 3 completed shifts:  In: -   Out: 150 [Emesis/NG output:150]      No " intake/output data recorded  General:  Patient is not in acute distress, laying in the bed comfortably,   awake, alert responding to commands, well oriented to time place and person  HEENT:  Both pupils normal-size atraumatic, normocephalic, nonicteric  Neck:  JVP not raised   Trachea central  Respiratory: normal Breath sounds clear to auscultation,  Cardiovascular:  S1-S2 normal without any murmur   GI:  Abdomen soft distention no rigidity no guarding  As per patient positive for flatus no bowel movement  Rectal: deferred  Genitalia: deferred  Musculoskeletal: Back pain  Integument:  No skin rashes or ulceration  Lymphatic:  No cervical or inguinal lymphadenopathy  Neurologic:  Patient is awake alert, responding to command, well-oriented to time and place and person moving all extremities ambulating well  Psychiatric:  Patient awake alert doesn't appear depressed affect normal  Extremities: extremities normal, atraumatic, no cyanosis or edema and no varicosities    Lab Results and Cultures:   CBC with diff:   Lab Results   Component Value Date    WBC 11 45 (H) 03/18/2023    HGB 12 0 03/18/2023    HCT 36 9 03/18/2023    MCV 91 03/18/2023     03/18/2023    MCH 29 7 03/18/2023    MCHC 32 5 03/18/2023    RDW 13 6 03/18/2023    MPV 10 0 03/18/2023    NRBC 0 03/18/2023   ,   BMP/CMP:  Lab Results   Component Value Date     01/03/2016    K 3 0 (L) 03/18/2023    K 3 7 01/03/2016     03/18/2023     01/03/2016    CO2 25 03/18/2023    CO2 30 3 01/03/2016    ANIONGAP 6 01/03/2016    BUN 2 (L) 03/18/2023    BUN 11 01/03/2016    CREATININE 0 50 (L) 03/18/2023    CREATININE 0 64 01/03/2016    GLUCOSE 97 01/03/2016    CALCIUM 8 7 03/18/2023    CALCIUM 8 7 01/03/2016    AST 15 03/17/2023    AST 28 12/29/2015    ALT 21 03/17/2023    ALT 43 12/29/2015    ALKPHOS 80 03/17/2023    ALKPHOS 129 (H) 12/29/2015    PROT 8 4 (H) 12/29/2015    BILITOT 0 70 12/29/2015    EGFR 109 03/18/2023     Coags:   Lab Results Component Value Date    PTT 30 06/19/2022    PTT 27 12/29/2015    INR 0 88 06/19/2022    INR 0 89 12/29/2015   ,  Blood Culture:   Lab Results   Component Value Date    BLOODCX No Growth After 5 Days  01/25/2023   ,   Urinalysis:   Lab Results   Component Value Date    COLORU Yellow 03/17/2023    COLORU Yellow 12/31/2015    CLARITYU Clear 03/17/2023    CLARITYU Clear 12/31/2015    SPECGRAV <=1 005 03/17/2023    SPECGRAV 1 010 12/31/2015    PHUR 6 0 03/17/2023    PHUR 6 0 03/23/2021    PHUR 6 5 12/31/2015    LEUKOCYTESUR Negative 03/17/2023    LEUKOCYTESUR Negative 12/31/2015    NITRITE Negative 03/17/2023    NITRITE Negative 12/31/2015    PROTEINUA Negative 12/31/2015    GLUCOSEU Negative 03/17/2023    GLUCOSEU Negative 12/31/2015    KETONESU Negative 03/17/2023    KETONESU 15 (1+) (A) 12/31/2015    BILIRUBINUR Negative 03/17/2023    BILIRUBINUR Negative 12/31/2015    BLOODU Negative 03/17/2023    BLOODU Negative 12/31/2015   ,   Urine Culture:   Lab Results   Component Value Date    URINECX  03/23/2021     >100,000 cfu/ml Gamma Hemolytic Streptococcus NOT Enterococcus    URINECX 50,000-59,000 cfu/ml 03/23/2021   ,   Wound Culure: No results found for: WOUNDCULT    Imaging: CT abdomen pelvis with contrast    Result Date: 3/17/2023  Impression: Findings concerning for gastritis  Recommend correlation with patient's symptoms  Fluid-filled colon and multiple small bowel loops but no wall thickening or hyperemia suggestive of laxative use   Workstation performed: [id number]     EKG, Pathology, and Other Studies: * Cannot find OR log *    VTE Pharmacologic Prophylaxis: Heparin  VTE Mechanical Prophylaxis: sequential compression device    Admitting Diagnosis: Abdominal pain [R10 9]  Constipation [K59 00]  Elevated lactic acid level [R79 89]  Code Status: Level 1 - Full Code  Length Of Stay: 0 day(s)    Assessment:  Gastritis  Morbid obesity  Abdominal pain nausea vomiting with constipation  Liquid stool all over the "colon on CT scan no obstruction was noted on CT scan  Insulin-dependent diabetes mellitus  Bipolar disorder  Hypokalemia on diuretics for hypertension    Plan:  N p o   NG tube to low continuous wall suction  Fleets enema  GI consult for EGD for thick-walled stomach gastritis and colonoscopy to rule out lower colon lesion  IV fluid for hydration  Replacement of electrolyte  Supportive symptomatic treatment as per medical team  IV Protonix for gastritis    Care were discussed patient detail all question answered to her satisfaction was made aware there is no emergent surgery is needed at present  We will continue follow-up    Counseling / Coordination of Care  Total floor / unit time spent today 30minutes  Greater than 50% of total time was spent with the patient and / or family counseling and / or coordination of care  Thank you for allowing me to participate in this patients’ care  Please do not hesitate to call with any additional questions  Norman Wilkerson MD [address]:  [employer]  [place] [address]  Office - [phone]  Fax - [phone]    03/18/23  1:08 PM    Portions of the record may have been created with voice recognition software  Occasional wrong word or \"sound a like\" substitutions may have occurred due to the inherent limitations of voice recognition software  Read the chart carefully and recognize, using context, where substitutions have occurred    "

## 2023-03-18 NOTE — ASSESSMENT & PLAN NOTE
Lab Results   Component Value Date    HGBA1C 6 9 (H) 11/02/2022       No results for input(s): POCGLU in the last 72 hours      Blood Sugar Average: Last 72 hrs:     · Update A1c  · Hold metformin  · Continue lantus 25 units h s   · SSI and acu-checks q6 while NPO

## 2023-03-19 LAB
ALBUMIN SERPL BCP-MCNC: 3.9 G/DL (ref 3.5–5)
ALP SERPL-CCNC: 150 U/L (ref 34–104)
ALT SERPL W P-5'-P-CCNC: 97 U/L (ref 7–52)
ANION GAP SERPL CALCULATED.3IONS-SCNC: 13 MMOL/L (ref 4–13)
AST SERPL W P-5'-P-CCNC: 44 U/L (ref 13–39)
BILIRUB SERPL-MCNC: 0.99 MG/DL (ref 0.2–1)
BUN SERPL-MCNC: 3 MG/DL (ref 5–25)
CALCIUM SERPL-MCNC: 9.3 MG/DL (ref 8.4–10.2)
CHLORIDE SERPL-SCNC: 104 MMOL/L (ref 96–108)
CO2 SERPL-SCNC: 25 MMOL/L (ref 21–32)
CREAT SERPL-MCNC: 0.52 MG/DL (ref 0.6–1.3)
ERYTHROCYTE [DISTWIDTH] IN BLOOD BY AUTOMATED COUNT: 13.4 % (ref 11.6–15.1)
GFR SERPL CREATININE-BSD FRML MDRD: 107 ML/MIN/1.73SQ M
GLUCOSE SERPL-MCNC: 102 MG/DL (ref 65–140)
GLUCOSE SERPL-MCNC: 111 MG/DL (ref 65–140)
GLUCOSE SERPL-MCNC: 115 MG/DL (ref 65–140)
GLUCOSE SERPL-MCNC: 139 MG/DL (ref 65–140)
GLUCOSE SERPL-MCNC: 96 MG/DL (ref 65–140)
GLUCOSE SERPL-MCNC: 99 MG/DL (ref 65–140)
HCT VFR BLD AUTO: 39.3 % (ref 34.8–46.1)
HGB BLD-MCNC: 12.7 G/DL (ref 11.5–15.4)
MCH RBC QN AUTO: 29.5 PG (ref 26.8–34.3)
MCHC RBC AUTO-ENTMCNC: 32.3 G/DL (ref 31.4–37.4)
MCV RBC AUTO: 91 FL (ref 82–98)
PLATELET # BLD AUTO: 399 THOUSANDS/UL (ref 149–390)
PMV BLD AUTO: 10.4 FL (ref 8.9–12.7)
POTASSIUM SERPL-SCNC: 3.6 MMOL/L (ref 3.5–5.3)
PROT SERPL-MCNC: 7.1 G/DL (ref 6.4–8.4)
RBC # BLD AUTO: 4.31 MILLION/UL (ref 3.81–5.12)
SODIUM SERPL-SCNC: 142 MMOL/L (ref 135–147)
WBC # BLD AUTO: 10.66 THOUSAND/UL (ref 4.31–10.16)

## 2023-03-19 RX ORDER — TRAZODONE HYDROCHLORIDE 100 MG/1
200 TABLET ORAL
Status: DISCONTINUED | OUTPATIENT
Start: 2023-03-19 | End: 2023-03-23 | Stop reason: HOSPADM

## 2023-03-19 RX ORDER — ARIPIPRAZOLE 5 MG/1
10 TABLET ORAL DAILY
Status: DISCONTINUED | OUTPATIENT
Start: 2023-03-19 | End: 2023-03-23 | Stop reason: HOSPADM

## 2023-03-19 RX ORDER — LEVOTHYROXINE SODIUM 0.03 MG/1
25 TABLET ORAL
Status: DISCONTINUED | OUTPATIENT
Start: 2023-03-19 | End: 2023-03-23 | Stop reason: HOSPADM

## 2023-03-19 RX ORDER — SENNOSIDES 8.6 MG
2 TABLET ORAL
Status: DISCONTINUED | OUTPATIENT
Start: 2023-03-19 | End: 2023-03-23 | Stop reason: HOSPADM

## 2023-03-19 RX ORDER — ESCITALOPRAM OXALATE 10 MG/1
10 TABLET ORAL DAILY
Status: DISCONTINUED | OUTPATIENT
Start: 2023-03-19 | End: 2023-03-23 | Stop reason: HOSPADM

## 2023-03-19 RX ORDER — NICOTINE 21 MG/24HR
1 PATCH, TRANSDERMAL 24 HOURS TRANSDERMAL DAILY
Status: DISCONTINUED | OUTPATIENT
Start: 2023-03-19 | End: 2023-03-19

## 2023-03-19 RX ORDER — POLYETHYLENE GLYCOL 3350 17 G/17G
17 POWDER, FOR SOLUTION ORAL DAILY
Status: DISCONTINUED | OUTPATIENT
Start: 2023-03-20 | End: 2023-03-23 | Stop reason: HOSPADM

## 2023-03-19 RX ORDER — CLONAZEPAM 0.5 MG/1
0.5 TABLET ORAL 2 TIMES DAILY PRN
Status: DISCONTINUED | OUTPATIENT
Start: 2023-03-19 | End: 2023-03-23 | Stop reason: HOSPADM

## 2023-03-19 RX ORDER — SUMATRIPTAN 25 MG/1
50 TABLET, FILM COATED ORAL ONCE AS NEEDED
Status: DISCONTINUED | OUTPATIENT
Start: 2023-03-19 | End: 2023-03-21

## 2023-03-19 RX ORDER — GABAPENTIN 400 MG/1
800 CAPSULE ORAL 3 TIMES DAILY
Status: DISCONTINUED | OUTPATIENT
Start: 2023-03-19 | End: 2023-03-23 | Stop reason: HOSPADM

## 2023-03-19 RX ORDER — DOCUSATE SODIUM 100 MG/1
100 CAPSULE, LIQUID FILLED ORAL 2 TIMES DAILY
Status: DISCONTINUED | OUTPATIENT
Start: 2023-03-19 | End: 2023-03-23 | Stop reason: HOSPADM

## 2023-03-19 RX ADMIN — ARIPIPRAZOLE 10 MG: 5 TABLET ORAL at 10:50

## 2023-03-19 RX ADMIN — HYDROMORPHONE HYDROCHLORIDE 1 MG: 1 INJECTION, SOLUTION INTRAMUSCULAR; INTRAVENOUS; SUBCUTANEOUS at 14:18

## 2023-03-19 RX ADMIN — DOCUSATE SODIUM 100 MG: 100 CAPSULE, LIQUID FILLED ORAL at 18:14

## 2023-03-19 RX ADMIN — SUMATRIPTAN SUCCINATE 50 MG: 25 TABLET ORAL at 16:31

## 2023-03-19 RX ADMIN — PANTOPRAZOLE SODIUM 40 MG: 40 INJECTION, POWDER, FOR SOLUTION INTRAVENOUS at 20:38

## 2023-03-19 RX ADMIN — LIDOCAINE 5% 1 PATCH: 700 PATCH TOPICAL at 08:36

## 2023-03-19 RX ADMIN — NICOTINE 1 PATCH: 14 PATCH, EXTENDED RELEASE TRANSDERMAL at 08:36

## 2023-03-19 RX ADMIN — HEPARIN SODIUM 5000 UNITS: 5000 INJECTION INTRAVENOUS; SUBCUTANEOUS at 19:46

## 2023-03-19 RX ADMIN — HYDROMORPHONE HYDROCHLORIDE 1 MG: 1 INJECTION, SOLUTION INTRAMUSCULAR; INTRAVENOUS; SUBCUTANEOUS at 09:19

## 2023-03-19 RX ADMIN — LEVOTHYROXINE SODIUM 25 MCG: 25 TABLET ORAL at 10:50

## 2023-03-19 RX ADMIN — TRAZODONE HYDROCHLORIDE 200 MG: 100 TABLET ORAL at 21:51

## 2023-03-19 RX ADMIN — HYDROMORPHONE HYDROCHLORIDE 1 MG: 1 INJECTION, SOLUTION INTRAMUSCULAR; INTRAVENOUS; SUBCUTANEOUS at 00:53

## 2023-03-19 RX ADMIN — GABAPENTIN 800 MG: 400 CAPSULE ORAL at 20:38

## 2023-03-19 RX ADMIN — LORAZEPAM 1 MG: 2 INJECTION INTRAMUSCULAR; INTRAVENOUS at 05:29

## 2023-03-19 RX ADMIN — PANTOPRAZOLE SODIUM 40 MG: 40 INJECTION, POWDER, FOR SOLUTION INTRAVENOUS at 08:37

## 2023-03-19 RX ADMIN — METOCLOPRAMIDE HYDROCHLORIDE 10 MG: 5 INJECTION INTRAMUSCULAR; INTRAVENOUS at 05:24

## 2023-03-19 RX ADMIN — HEPARIN SODIUM 5000 UNITS: 5000 INJECTION INTRAVENOUS; SUBCUTANEOUS at 04:03

## 2023-03-19 RX ADMIN — GABAPENTIN 800 MG: 400 CAPSULE ORAL at 10:50

## 2023-03-19 RX ADMIN — HYDROMORPHONE HYDROCHLORIDE 1 MG: 1 INJECTION, SOLUTION INTRAMUSCULAR; INTRAVENOUS; SUBCUTANEOUS at 05:14

## 2023-03-19 RX ADMIN — GABAPENTIN 800 MG: 400 CAPSULE ORAL at 16:31

## 2023-03-19 RX ADMIN — HEPARIN SODIUM 5000 UNITS: 5000 INJECTION INTRAVENOUS; SUBCUTANEOUS at 10:50

## 2023-03-19 RX ADMIN — ESCITALOPRAM OXALATE 10 MG: 10 TABLET ORAL at 10:50

## 2023-03-19 NOTE — PLAN OF CARE
Problem: GASTROINTESTINAL - ADULT  Goal: Minimal or absence of nausea and/or vomiting  Description: INTERVENTIONS:  - Administer IV fluids if ordered to ensure adequate hydration  - Maintain NPO status until nausea and vomiting are resolved  - Nasogastric tube if ordered  - Administer ordered antiemetic medications as needed  - Provide nonpharmacologic comfort measures as appropriate  - Advance diet as tolerated, if ordered  - Consider nutrition services referral to assist patient with adequate nutrition and appropriate food choices  3/19/2023 0007 by Nathaly Blanton RN  Outcome: Progressing  3/19/2023 0006 by Nathaly Blanton RN  Outcome: Progressing  Goal: Maintains or returns to baseline bowel function  Description: INTERVENTIONS:  - Assess bowel function  - Encourage oral fluids to ensure adequate hydration  - Administer IV fluids if ordered to ensure adequate hydration  - Administer ordered medications as needed  - Encourage mobilization and activity  - Consider nutritional services referral to assist patient with adequate nutrition and appropriate food choices  3/19/2023 0007 by Nathaly Blanton RN  Outcome: Progressing  3/19/2023 0006 by Nathaly Blanton RN  Outcome: Progressing  Goal: Maintains adequate nutritional intake  Description: INTERVENTIONS:  - Monitor percentage of each meal consumed  - Identify factors contributing to decreased intake, treat as appropriate  - Assist with meals as needed  - Monitor I&O, weight, and lab values if indicated  - Obtain nutrition services referral as needed  3/19/2023 0007 by Nathaly Blanton RN  Outcome: Progressing  3/19/2023 0006 by Nathaly Blanton RN  Outcome: Progressing     Problem: PAIN - ADULT  Goal: Verbalizes/displays adequate comfort level or baseline comfort level  Description: Interventions:  - Encourage patient to monitor pain and request assistance  - Assess pain using appropriate pain scale  - Administer analgesics based on type and severity of pain and evaluate response  - Implement non-pharmacological measures as appropriate and evaluate response  - Consider cultural and social influences on pain and pain management  - Notify physician/advanced practitioner if interventions unsuccessful or patient reports new pain  3/19/2023 0007 by Erika Rascon RN  Outcome: Progressing  3/19/2023 0006 by Erika Rascon RN  Outcome: Progressing     Problem: INFECTION - ADULT  Goal: Absence or prevention of progression during hospitalization  Description: INTERVENTIONS:  - Assess and monitor for signs and symptoms of infection  - Monitor lab/diagnostic results  - Monitor all insertion sites, i e  indwelling lines, tubes, and drains  - Monitor endotracheal if appropriate and nasal secretions for changes in amount and color  - [place] appropriate cooling/warming therapies per order  - Administer medications as ordered  - Instruct and encourage patient and family to use good hand hygiene technique  - Identify and instruct in appropriate isolation precautions for identified infection/condition  3/19/2023 0007 by Erika Rascon RN  Outcome: Progressing  3/19/2023 0006 by Erika Rascon RN  Outcome: Progressing  Goal: Absence of fever/infection during neutropenic period  Description: INTERVENTIONS:  - Monitor WBC    3/19/2023 0007 by Erika Rascon RN  Outcome: Progressing  3/19/2023 0006 by Erika Rascon RN  Outcome: Progressing     Problem: SAFETY ADULT  Goal: Patient will remain free of falls  Description: INTERVENTIONS:  - Educate patient/family on patient safety including physical limitations  - Instruct patient to call for assistance with activity   - Consult OT/PT to assist with strengthening/mobility   - Keep Call bell within reach  - Keep bed low and locked with side rails adjusted as appropriate  - Keep care items and personal belongings within reach  - Initiate and maintain comfort rounds  - Make Fall Risk Sign visible to staff  - Offer Toileting every 2 Hours, in advance of need  - Initiate/Maintain bed alarm  - Obtain necessary fall risk management equipment:   - Apply yellow socks and bracelet for high fall risk patients  - Consider moving patient to room near nurses station  3/19/2023 0007 by Soumya Garber RN  Outcome: Progressing  3/19/2023 0006 by Soumya Garber RN  Outcome: Progressing  Goal: Maintain or return to baseline ADL function  Description: INTERVENTIONS:  -  Assess patient's ability to carry out ADLs; assess patient's baseline for ADL function and identify physical deficits which impact ability to perform ADLs (bathing, care of mouth/teeth, toileting, grooming, dressing, etc )  - Assess/evaluate cause of self-care deficits   - Assess range of motion  - Assess patient's mobility; develop plan if impaired  - Assess patient's need for assistive devices and provide as appropriate  - Encourage maximum independence but intervene and supervise when necessary  - Involve family in performance of ADLs  - Assess for home care needs following discharge   - Consider OT consult to assist with ADL evaluation and planning for discharge  - Provide patient education as appropriate  3/19/2023 0007 by Soumya Garber RN  Outcome: Progressing  3/19/2023 0006 by Soumya Garber RN  Outcome: Progressing  Goal: Maintains/Returns to pre admission functional level  Description: INTERVENTIONS:  - Perform BMAT or MOVE assessment daily    - Set and communicate daily mobility goal to care team and patient/family/caregiver  - Collaborate with rehabilitation services on mobility goals if consulted  - Perform Range of Motion 3 times a day  - Reposition patient every 2 hours    - Dangle patient 3 times a day  - Stand patient 3 times a day  - Ambulate patient 3 times a day  - Out of bed to chair 3 times a day   - Out of bed for meals 3 times a day  - Out of bed for toileting  - Record patient progress and toleration of activity level   3/19/2023 0007 by Soumya Garber RN  Outcome: Progressing  3/19/2023 0006 by John Cerna RN  Outcome: Progressing     Problem: DISCHARGE PLANNING  Goal: Discharge to home or other facility with appropriate resources  Description: INTERVENTIONS:  - Identify barriers to discharge w/patient and caregiver  - Arrange for needed discharge resources and transportation as appropriate  - Identify discharge learning needs (meds, wound care, etc )  - Arrange for interpretive services to assist at discharge as needed  - Refer to Case Management Department for coordinating discharge planning if the patient needs post-hospital services based on physician/advanced practitioner order or complex needs related to functional status, cognitive ability, or social support system  3/19/2023 0007 by John Cerna RN  Outcome: Progressing  3/19/2023 0006 by John Cerna RN  Outcome: Progressing     Problem: Knowledge Deficit  Goal: Patient/family/caregiver demonstrates understanding of disease process, treatment plan, medications, and discharge instructions  Description: Complete learning assessment and assess knowledge base    Interventions:  - Provide teaching at level of understanding  - Provide teaching via preferred learning methods  3/19/2023 0007 by John Cerna RN  Outcome: Progressing  3/19/2023 0006 by John Cerna RN  Outcome: Progressing

## 2023-03-19 NOTE — PROGRESS NOTES
"Progress Note - General Surgery   Patient: Jerod Stable   : 1965 Sex: female MRN: [id number]   CSN: [id number] PCP: Sabra Garsia MD  Unit/Bed#: -01     SUBJECTIVE:   I am doing much better  I had a bowel movement and my pain is better  OBJECTIVE  Right events noted patient had tachycardia had normal x-ray which was nonobstructive  Anime patient had multiple Liquid bowel movement    Vitals:   I/O last 24 hours: In: -   Out: 100 [Emesis/NG output:100]Blood pressure 164/93, pulse (!) 106, temperature 98 7 °F (37 1 °C), temperature source Tympanic, resp  rate 18, height 5' 3\" (1 6 m), weight 77 7 kg (171 lb 6 4 oz), SpO2 99 %, not currently breastfeeding  ,Body mass index is 30 36 kg/m²    Invasive Devices     Peripheral Intravenous Line  Duration           Peripheral IV 23 Left Antecubital <1 day          Drain  Duration           NG/OG/Enteral Tube Nasogastric 16 Fr Right nare 1 day              Active medications:    Current Facility-Administered Medications:   •  ARIPiprazole (ABILIFY) tablet 10 mg, 10 mg, Oral, Daily  •  clonazePAM (KlonoPIN) tablet 0 5 mg, 0 5 mg, Oral, BID PRN  •  escitalopram (LEXAPRO) tablet 10 mg, 10 mg, Oral, Daily  •  gabapentin (NEURONTIN) tablet 800 mg, 800 mg, Oral, TID  •  heparin (porcine) subcutaneous injection 5,000 Units, 5,000 Units, Subcutaneous, Q8H [facility], 5,000 Units at 23 0403  •  hydrALAZINE (APRESOLINE) injection 5 mg, 5 mg, Intravenous, Q6H PRN  •  HYDROmorphone (DILAUDID) injection 1 mg, 1 mg, Intravenous, Q4H PRN, 1 mg at 23 0919  •  insulin glargine (LANTUS) subcutaneous injection 25 Units 0 25 mL, 25 Units, Subcutaneous, HS  •  insulin lispro (HumaLOG) 100 units/mL subcutaneous injection 1-6 Units, 1-6 Units, Subcutaneous, Q6H [facility] **AND** Fingerstick Glucose (POCT), , , Q6H  •  lactated ringers infusion, 100 mL/hr, Intravenous, Continuous, 100 mL/hr at 23 1121  •  levothyroxine tablet 25 mcg, 25 mcg, Oral, Early Morning  •  lidocaine " (LIDODERM) 5 % patch 1 patch, 1 patch, Topical, Daily, 1 patch at 03/19/23 0836  •  LORazepam (ATIVAN) injection 1 mg, 1 mg, Intravenous, Q4H PRN, 1 mg at 03/19/23 0529  •  metoclopramide (REGLAN) injection 10 mg, 10 mg, Intravenous, Q6H PRN, 10 mg at 03/19/23 0524  •  metoprolol (LOPRESSOR) injection 5 mg, 5 mg, Intravenous, Q6H PRN, 5 mg at 03/18/23 1834  •  nicotine (NICODERM CQ) 14 mg/24hr TD 24 hr patch 1 patch, 1 patch, Transdermal, Daily, 1 patch at 03/19/23 0836  •  pantoprazole (PROTONIX) injection 40 mg, 40 mg, Intravenous, Q12H NACHO, 40 mg at 03/19/23 0837  •  sodium chloride 0 9 % bolus 1,000 mL, 1,000 mL, Intravenous, Once, Held at 03/18/23 0108  •  SUMAtriptan (IMITREX) tablet 50 mg, 50 mg, Oral, Once PRN  •  traZODone (DESYREL) tablet 200 mg, 200 mg, Oral, HS    Physical Exam:   General Alert awake   Normocephalic atraumatic PERRLA  Lungs clear bilaterally  Cardiac normal S1 normal S2 sinus tachycardia with heart rate 106  Abdomen soft,non tender Bowel sounds present passing flatus had bowel movement  Scar from previous surgery abdomen is more softer and less distended  Skin: Moist  Ext: No clubbing, cyanosis, edema    Lab, Imaging and other studies:  Recent Results (from the past 24 hour(s))   Fingerstick Glucose (POCT)    Collection Time: 03/18/23 11:54 AM   Result Value Ref Range    POC Glucose 118 65 - 140 mg/dl   Fingerstick Glucose (POCT)    Collection Time: 03/18/23  5:32 PM   Result Value Ref Range    POC Glucose 104 65 - 140 mg/dl   CBC and differential    Collection Time: 03/18/23  7:46 PM   Result Value Ref Range    WBC 11 25 (H) 4 31 - 10 16 Thousand/uL    RBC 4 28 3 81 - 5 12 Million/uL    Hemoglobin 12 7 11 5 - 15 4 g/dL    Hematocrit 37 7 34 8 - 46 1 %    MCV 88 82 - 98 fL    MCH 29 7 26 8 - 34 3 pg    MCHC 33 7 31 4 - 37 4 g/dL    RDW 13 7 11 6 - 15 1 %    MPV 9 5 8 9 - 12 7 fL    Platelets 395 053 - 949 Thousands/uL    nRBC 0 /100 WBCs    Neutrophils Relative 69 43 - 75 %    Immat GRANS % 0 0 - 2 %    Lymphocytes Relative 21 14 - 44 %    Monocytes Relative 9 4 - 12 %    Eosinophils Relative 1 0 - 6 %    Basophils Relative 0 0 - 1 %    Neutrophils Absolute 7 68 (H) 1 85 - 7 62 Thousands/µL    Immature Grans Absolute 0 04 0 00 - 0 20 Thousand/uL    Lymphocytes Absolute 2 37 0 60 - 4 47 Thousands/µL    Monocytes Absolute 1 05 0 17 - 1 22 Thousand/µL    Eosinophils Absolute 0 08 0 00 - 0 61 Thousand/µL    Basophils Absolute 0 03 0 00 - 0 10 Thousands/µL   Lactic acid, plasma    Collection Time: 03/18/23  7:46 PM   Result Value Ref Range    LACTIC ACID 0 9 0 5 - 2 0 mmol/L   Basic metabolic panel    Collection Time: 03/18/23  7:46 PM   Result Value Ref Range    Sodium 142 135 - 147 mmol/L    Potassium 3 6 3 5 - 5 3 mmol/L    Chloride 105 96 - 108 mmol/L    CO2 27 21 - 32 mmol/L    ANION GAP 10 4 - 13 mmol/L    BUN 3 (L) 5 - 25 mg/dL    Creatinine 0 56 (L) 0 60 - 1 30 mg/dL    Glucose 106 65 - 140 mg/dL    Calcium 9 0 8 4 - 10 2 mg/dL    eGFR 105 ml/min/1 73sq m   Fingerstick Glucose (POCT)    Collection Time: 03/18/23  9:20 PM   Result Value Ref Range    POC Glucose 112 65 - 140 mg/dl   Fingerstick Glucose (POCT)    Collection Time: 03/19/23  2:07 AM   Result Value Ref Range    POC Glucose 96 65 - 140 mg/dl   CBC    Collection Time: 03/19/23  5:44 AM   Result Value Ref Range    WBC 10 66 (H) 4 31 - 10 16 Thousand/uL    RBC 4 31 3 81 - 5 12 Million/uL    Hemoglobin 12 7 11 5 - 15 4 g/dL    Hematocrit 39 3 34 8 - 46 1 %    MCV 91 82 - 98 fL    MCH 29 5 26 8 - 34 3 pg    MCHC 32 3 31 4 - 37 4 g/dL    RDW 13 4 11 6 - 15 1 %    Platelets 955 (H) 391 - 390 Thousands/uL    MPV 10 4 8 9 - 12 7 fL   Comprehensive metabolic panel    Collection Time: 03/19/23  5:44 AM   Result Value Ref Range    Sodium 142 135 - 147 mmol/L    Potassium 3 6 3 5 - 5 3 mmol/L    Chloride 104 96 - 108 mmol/L    CO2 25 21 - 32 mmol/L    ANION GAP 13 4 - 13 mmol/L    BUN 3 (L) 5 - 25 mg/dL    Creatinine 0 52 (L) 0 60 - 1 30 mg/dL Glucose 139 65 - 140 mg/dL    Calcium 9 3 8 4 - 10 2 mg/dL    AST 44 (H) 13 - 39 U/L    ALT 97 (H) 7 - 52 U/L    Alkaline Phosphatase 150 (H) 34 - 104 U/L    Total Protein 7 1 6 4 - 8 4 g/dL    Albumin 3 9 3 5 - 5 0 g/dL    Total Bilirubin 0 99 0 20 - 1 00 mg/dL    eGFR 107 ml/min/1 73sq m   Fingerstick Glucose (POCT)    Collection Time: 03/19/23  8:33 AM   Result Value Ref Range    POC Glucose 99 65 - 140 mg/dl     VTE Pharmacologic Prophylaxis: Heparin  VTE Mechanical Prophylaxis: sequential compression device          Diet Orders   (From admission, onward)             Start     Ordered    03/17/23 2346  Diet NPO  Diet effective now        References:    Nutrtion Support Algorithm Enteral vs  Parenteral   Question Answer Comment   Diet Type NPO    RD to adjust diet per protocol? Yes        03/17/23 2345                Admitting Diagnosis: Abdominal pain [R10 9]  Constipation [K59 00]  Elevated lactic acid level [R79 89]  Code Status: Level 1 - Full Code  Patient Active Problem List   Diagnosis   • Abdominal pain   • Essential hypertension   • Alcohol use disorder, moderate, dependence ([facility])   • Post-traumatic stress disorder, chronic   • Generalized anxiety disorder   • Lactic acidosis   • Bipolar disorder current episode depressed ([facility] )   • Bipolar 1 disorder, depressed ([facility] )   • Benzodiazepine dependence, continuous ([facility])   • Non compliance w medication regimen   • Acquired hypothyroidism   • Hypokalemia   • Hyperlipemia   • Transaminitis   • Cognitive dysfunction in bipolar disorder ([facility] )   • Medical clearance for psychiatric admission   • Tobacco abuse   • Toxic encephalopathy   • Gastroesophageal reflux disease without esophagitis   • Increased anion gap metabolic acidosis   • STI (sexually transmitted infection)   • Chest pressure   • Diabetes mellitus type 2 in obese [facility])   • Primary hypertension   • Hypothyroidism   • Vitamin D deficiency   • Tiredness   • Obesity, morbid ([facility] )   • Tachypnea   • "Hypertensive urgency   • Tachycardia   • Bronchitis with acute wheezing   • Cigarette smoker   • Chronic cough   • Screening for blood or protein in urine   • Pulmonary emphysema, unspecified emphysema type (HCC)   • Sepsis ([address] )   • Long-term use of high-risk medication   • Migraine without aura and without status migrainosus, not intractable     PT/OT/ST reviewed  Discussed with Dr Maris Whitlock MD  Plan was coordinated with Nursing staff & Case management  Plan of care was discussed with patient    Assessment/ Plan:  Padmaja Stone is a 56 y o  female 1 day(s) second hospital day  Gastritis constipation nausea vomiting abdominal pain adhesions previous multiple abdominal surgery  Possible gastroenteritis with liquid stool filled colon and small bowel    Pain control  Pulmonary hygiene  DVT prophylaxis  OOB/Ambulation  DC NG tube  Clear liquid diet  Watch for high heart rate and electrolyte  Care were discussed with patient in detail all question answered to her satisfaction no emergent surgery at present    Counseling / Coordination of Care  Total floor / unit time spent today 30minutes  Greater than 50% of total time was spent with the patient and / or family counseling and / or coordination of care  Cary Murillo MD MS FRCS FACS  [address] [facility]  03/19/23 10:18 AM  [facility]:  [address]  [facility] [address], [address] [place] [address]  Office  [phone] Fax [phone]    Portions of the record may have been created with voice recognition software  Occasional wrong word or \"sound a like\" substitutions may have occurred due to the inherent limitations of voice recognition software  Read the chart carefully and recognize, using context, where substitutions have occurred          "

## 2023-03-19 NOTE — PROGRESS NOTES
[address]  Progress Note Tiburcio Das 1965, 56 y o  female MRN: [id number]  Unit/Bed#: -01 Encounter: [id number]  Primary Care Provider: Awilda Alegria MD   Date and time admitted to hospital: 3/17/2023  8:37 PM    Cigarette smoker  Assessment & Plan  · NRT ordered    Diabetes mellitus type 2 in obese [facility])  Assessment & Plan  Lab Results   Component Value Date    HGBA1C 5 5 03/17/2023       Recent Labs     03/18/23  2120 03/19/23  0207 03/19/23  0833 03/19/23  1141   POCGLU 112 96 99 115       Blood Sugar Average: Last 72 hrs:  (P) 103 625   · Update A1c  · Hold metformin  · Continue lantus 25 units h s   · SSI and acu-checks q6 while NPO    Lactic acidosis  Assessment & Plan  · Lactic 3 0  AG 14  · Suspect due to dehydration with ongoing vomiting and decreased PO intake  · Continue IVF hydration  · Trend lactic/BMP    Generalized anxiety disorder  Assessment & Plan  · Hold home clonazepam, Abilify, Lexapro, trazodone while n p o   · IV Ativan as needed for now    Essential hypertension  Assessment & Plan  · Home HCTZ and Lopressor held while NPO  · IV hydralazine p r n    * Abdominal pain  Assessment & Plan  · Presents with abdominal pain, distension, N/V progressing over past week  Notes last BM 5 days prior  Not passing flatus  Not improved with home Miralax and prune juice  · With admission for same in January  Constipation appears to continue to be driving factor of symptoms  Now on daily Miralax without improvement  Consider addition of Senna once taking PO  · Advised for GI follow-up and colonoscopy after discharge  · CT abd/pelvis: findings concerning for gastritis  Fluid-filled colon and multiple small bowel loops but no wall thickening or hyperemia suggestive of laxative use  · AFVSS  WBC 11 29  Tachycardia improving with IVF  Lactic 3 0  · Remove NG tube as patient abdominal pain has improved  Patient had a liquid bowel movement    · Will start on clear liquid diet as patient has loose stools since the last night after enema  ·   · IV Protonix for gastritis while NPO  · Case d/w general surg in ED recommending NGT placement  · General surg consult appreciated         VTE Pharmacologic Prophylaxis: VTE Score: 3 Moderate Risk (Score 3-4) - Pharmacological DVT Prophylaxis Ordered: heparin  Patient Centered Rounds: I performed bedside rounds with nursing staff today  Discussions with Specialists or Other Care Team Provider: staff    Education and Discussions with Family / Patient: Updated  () via phone  Total Time Spent on Date of Encounter in care of patient: 55 minutes This time was spent on one or more of the following: performing physical exam; counseling and coordination of care; obtaining or reviewing history; documenting in the medical record; reviewing/ordering tests, medications or procedures; communicating with other healthcare professionals and discussing with patient's family/caregivers  Current Length of Stay: 1 day(s)  Current Patient Status: Inpatient   Certification Statement: The patient will continue to require additional inpatient hospital stay due to Small bowel obstruction  Discharge Plan: Anticipate discharge in 24-48 hrs to home  Code Status: Level 1 - Full Code    Subjective:   Is feeling better  Abdominal pain has improved  No acute overnight events noted  Patient noted tachycardia with supraventricular tachycardia, currently heart rate is improved  Objective:     Vitals:   Temp (24hrs), Av 1 °F (37 3 °C), Min:98 °F (36 7 °C), Max:100 °F (37 8 °C)    Temp:  [98 °F (36 7 °C)-100 °F (37 8 °C)] 98 8 °F (37 1 °C)  HR:  [] 86  Resp:  [12-20] 12  BP: (141-164)/(71-98) 149/85  SpO2:  [90 %-99 %] 97 %  Body mass index is 30 36 kg/m²  Input and Output Summary (last 24 hours):      Intake/Output Summary (Last 24 hours) at 3/19/2023 1539  Last data filed at 3/19/2023 0501  Gross per 24 hour   Intake --   Output 100 ml   Net -100 ml       Physical Exam:   Physical Exam   HEENT-PERRLA, moist oral mucosa  Neck-supple, no JVD elevation   Respiratory-equal air entry bilaterally, no rales or rhonchi  Cardiovascular system-S1, S2 heard, no murmur or gallops or rubs  Abdomen-soft, nontender, no guarding or rigidity, bowel sounds sluggish  Extremities-no pedal edema  Peripheral pulses palpable  Musculoskeletal-no contractures  Central nervous system-no acute focal neurological deficit ,no sensory or motor deficit noted    Skin-no rash noted        Additional Data:     Labs:  Results from last 7 days   Lab Units 03/19/23  0544 03/18/23  1946   WBC Thousand/uL 10 66* 11 25*   HEMOGLOBIN g/dL 12 7 12 7   HEMATOCRIT % 39 3 37 7   PLATELETS Thousands/uL 399* 357   NEUTROS PCT %  --  69   LYMPHS PCT %  --  21   MONOS PCT %  --  9   EOS PCT %  --  1     Results from last 7 days   Lab Units 03/19/23  0544   SODIUM mmol/L 142   POTASSIUM mmol/L 3 6   CHLORIDE mmol/L 104   CO2 mmol/L 25   BUN mg/dL 3*   CREATININE mg/dL 0 52*   ANION GAP mmol/L 13   CALCIUM mg/dL 9 3   ALBUMIN g/dL 3 9   TOTAL BILIRUBIN mg/dL 0 99   ALK PHOS U/L 150*   ALT U/L 97*   AST U/L 44*   GLUCOSE RANDOM mg/dL 139         Results from last 7 days   Lab Units 03/19/23  1141 03/19/23  0833 03/19/23  0207 03/18/23  2120 03/18/23  1732 03/18/23  1154 03/18/23  0559 03/18/23  0107   POC GLUCOSE mg/dl 115 99 96 112 104 118 100 85     Results from last 7 days   Lab Units 03/17/23 2123   HEMOGLOBIN A1C % 5 5     Results from last 7 days   Lab Units 03/18/23  1946 03/18/23  0201 03/17/23 2123   LACTIC ACID mmol/L 0 9 1 5 3 0*       Lines/Drains:  Invasive Devices     Peripheral Intravenous Line  Duration           Peripheral IV 03/18/23 Left Antecubital 1 day                  Telemetry:  Telemetry Orders (From admission, onward)             48 Hour Telemetry Monitoring  Continuous x 48 hours        References:    Telemetry Guidelines   Question:  Reason for 48 Hour Telemetry Answer:  Arrhythmias Requiring Medical Therapy (eg  SVT, Vtach/fib, Bradycardia, Uncontrolled A-fib)                 Telemetry Reviewed: Normal Sinus Rhythm  Indication for Continued Telemetry Use: No indication for continued use  Will discontinue                Imaging: Reviewed radiology reports from this admission including: abdominal/pelvic CT    Recent Cultures (last 7 days):         Last 24 Hours Medication List:   Current Facility-Administered Medications   Medication Dose Route Frequency Provider Last Rate   • ARIPiprazole  10 mg Oral Daily Maris Steele MD     • clonazePAM  0 5 mg Oral BID PRN Eda Randall MD     • escitalopram  10 mg Oral Daily Eda Randall MD     • gabapentin  800 mg Oral TID Eda Randall MD     • heparin (porcine)  5,000 Units Subcutaneous Q8H [address] Sarah Mercado PA-C     • hydrALAZINE  5 mg Intravenous Q6H PRN Sarah Mercado PA-C     • HYDROmorphone  1 mg Intravenous Q4H PRN Eda Randall MD     • insulin glargine  25 Units Subcutaneous HS Sarah Mercado PA-C     • insulin lispro  1-6 Units Subcutaneous Q6H [address] Sarah Mercado PA-C     • lactated ringers  100 mL/hr Intravenous Continuous Modesto Pan PA-C 100 mL/hr (03/18/23 1121)   • levothyroxine  25 mcg Oral Early Morning Maris Steele MD     • lidocaine  1 patch Topical Daily Maris Steele MD     • LORazepam  1 mg Intravenous Q4H PRN Eda Randall MD     • metoclopramide  10 mg Intravenous Q6H PRN Eda Randall MD     • metoprolol  5 mg Intravenous Q6H PRN Eda Randall MD     • nicotine  1 patch Transdermal Daily Sarah Mercado PA-C     • pantoprazole  40 mg Intravenous Q12H [address] Sarah Mercado PA-C     • sodium chloride  1,000 mL Intravenous Once Karl Sotelo MD Stopped (03/18/23 0135)   • SUMAtriptan  50 mg Oral Once PRN Jadiel Chapman MD     • traZODone  200 mg Oral HS Jadiel Chapman MD          Today, Patient Was Seen By: Jadiel Chapman MD    **Please Note: This note may have been constructed using a voice recognition system  **

## 2023-03-19 NOTE — PLAN OF CARE
Problem: GASTROINTESTINAL - ADULT  Goal: Minimal or absence of nausea and/or vomiting  Description: INTERVENTIONS:  - Administer IV fluids if ordered to ensure adequate hydration  - Maintain NPO status until nausea and vomiting are resolved  - Nasogastric tube if ordered  - Administer ordered antiemetic medications as needed  - Provide nonpharmacologic comfort measures as appropriate  - Advance diet as tolerated, if ordered  - Consider nutrition services referral to assist patient with adequate nutrition and appropriate food choices  Outcome: Progressing  Goal: Maintains or returns to baseline bowel function  Description: INTERVENTIONS:  - Assess bowel function  - Encourage oral fluids to ensure adequate hydration  - Administer IV fluids if ordered to ensure adequate hydration  - Administer ordered medications as needed  - Encourage mobilization and activity  - Consider nutritional services referral to assist patient with adequate nutrition and appropriate food choices  Outcome: Progressing  Goal: Maintains adequate nutritional intake  Description: INTERVENTIONS:  - Monitor percentage of each meal consumed  - Identify factors contributing to decreased intake, treat as appropriate  - Assist with meals as needed  - Monitor I&O, weight, and lab values if indicated  - Obtain nutrition services referral as needed  Outcome: Progressing     Problem: PAIN - ADULT  Goal: Verbalizes/displays adequate comfort level or baseline comfort level  Description: Interventions:  - Encourage patient to monitor pain and request assistance  - Assess pain using appropriate pain scale  - Administer analgesics based on type and severity of pain and evaluate response  - Implement non-pharmacological measures as appropriate and evaluate response  - Consider cultural and social influences on pain and pain management  - Notify physician/advanced practitioner if interventions unsuccessful or patient reports new pain  Outcome: Progressing Problem: INFECTION - ADULT  Goal: Absence or prevention of progression during hospitalization  Description: INTERVENTIONS:  - Assess and monitor for signs and symptoms of infection  - Monitor lab/diagnostic results  - Monitor all insertion sites, i e  indwelling lines, tubes, and drains  - Monitor endotracheal if appropriate and nasal secretions for changes in amount and color  - Sparkman appropriate cooling/warming therapies per order  - Administer medications as ordered  - Instruct and encourage patient and family to use good hand hygiene technique  - Identify and instruct in appropriate isolation precautions for identified infection/condition  Outcome: Progressing  Goal: Absence of fever/infection during neutropenic period  Description: INTERVENTIONS:  - Monitor WBC    Outcome: Progressing     Problem: SAFETY ADULT  Goal: Patient will remain free of falls  Description: INTERVENTIONS:  - Educate patient/family on patient safety including physical limitations  - Instruct patient to call for assistance with activity   - Consult OT/PT to assist with strengthening/mobility   - Keep Call bell within reach  - Keep bed low and locked with side rails adjusted as appropriate  - Keep care items and personal belongings within reach  - Initiate and maintain comfort rounds  - Make Fall Risk Sign visible to staff  - Offer Toileting every 2 Hours, in advance of need  - Initiate/Maintain bed alarm  - Obtain necessary fall risk management equipment:   - Apply yellow socks and bracelet for high fall risk patients  - Consider moving patient to room near nurses station  Outcome: Progressing  Goal: Maintain or return to baseline ADL function  Description: INTERVENTIONS:  -  Assess patient's ability to carry out ADLs; assess patient's baseline for ADL function and identify physical deficits which impact ability to perform ADLs (bathing, care of mouth/teeth, toileting, grooming, dressing, etc )  - Assess/evaluate cause of self-care deficits   - Assess range of motion  - Assess patient's mobility; develop plan if impaired  - Assess patient's need for assistive devices and provide as appropriate  - Encourage maximum independence but intervene and supervise when necessary  - Involve family in performance of ADLs  - Assess for home care needs following discharge   - Consider OT consult to assist with ADL evaluation and planning for discharge  - Provide patient education as appropriate  Outcome: Progressing  Goal: Maintains/Returns to pre admission functional level  Description: INTERVENTIONS:  - Perform BMAT or MOVE assessment daily    - Set and communicate daily mobility goal to care team and patient/family/caregiver  - Collaborate with rehabilitation services on mobility goals if consulted  - Perform Range of Motion 3 times a day  - Reposition patient every 2 hours    - Dangle patient 3 times a day  - Stand patient 3 times a day  - Ambulate patient 3 times a day  - Out of bed to chair 3 times a day   - Out of bed for meals 3 times a day  - Out of bed for toileting  - Record patient progress and toleration of activity level   Outcome: Progressing     Problem: DISCHARGE PLANNING  Goal: Discharge to home or other facility with appropriate resources  Description: INTERVENTIONS:  - Identify barriers to discharge w/patient and caregiver  - Arrange for needed discharge resources and transportation as appropriate  - Identify discharge learning needs (meds, wound care, etc )  - Arrange for interpretive services to assist at discharge as needed  - Refer to Case Management Department for coordinating discharge planning if the patient needs post-hospital services based on physician/advanced practitioner order or complex needs related to functional status, cognitive ability, or social support system  Outcome: Progressing     Problem: Knowledge Deficit  Goal: Patient/family/caregiver demonstrates understanding of disease process, treatment plan, medications, and discharge instructions  Description: Complete learning assessment and assess knowledge base    Interventions:  - Provide teaching at level of understanding  - Provide teaching via preferred learning methods  Outcome: Progressing

## 2023-03-19 NOTE — ASSESSMENT & PLAN NOTE
· Presents with abdominal pain, distension, N/V progressing over past week  Notes last BM 5 days prior  Not passing flatus  Not improved with home Miralax and prune juice  · With admission for same in January  Constipation appears to continue to be driving factor of symptoms  Now on daily Miralax without improvement  Consider addition of Senna once taking PO  · Advised for GI follow-up and colonoscopy after discharge  · CT abd/pelvis: findings concerning for gastritis  Fluid-filled colon and multiple small bowel loops but no wall thickening or hyperemia suggestive of laxative use  · AFVSS  WBC 11 29  Tachycardia improving with IVF  Lactic 3 0  · Remove NG tube as patient abdominal pain has improved  Patient had a liquid bowel movement  · Will start on clear liquid diet as patient has loose stools since the last night after enema    ·   · IV Protonix for gastritis while NPO  · Case d/w general surg in ED recommending NGT placement  · General surg consult appreciated

## 2023-03-19 NOTE — ASSESSMENT & PLAN NOTE
Lab Results   Component Value Date    HGBA1C 5 5 03/17/2023       Recent Labs     03/18/23  2120 03/19/23  0207 03/19/23  0833 03/19/23  1141   POCGLU 112 96 99 115       Blood Sugar Average: Last 72 hrs:  (P) 103 625   · Update A1c  · Hold metformin  · Continue lantus 25 units h s   · SSI and acu-checks q6 while NPO

## 2023-03-19 NOTE — QUICK NOTE
Notified patient with tachycardia  Patient asymptomatic, only complaining of continued abdominal pain  EKG obtained revealing SVT was given 5mg IV lopressor and 15mg IV Cardizem with improvement  Gave Reglan/dilaudid for nausea and pain with improvement in symptoms  Repeat KUB ordered  Stat lactic, BMP/CBC obtained  On re-evaluation, patient resting comfortably  EKG with sinus tachycardia HR 90-110s

## 2023-03-20 PROBLEM — R79.89 ELEVATED LACTIC ACID LEVEL: Status: ACTIVE | Noted: 2019-12-23

## 2023-03-20 LAB
ANION GAP SERPL CALCULATED.3IONS-SCNC: 10 MMOL/L (ref 4–13)
BUN SERPL-MCNC: 4 MG/DL (ref 5–25)
CALCIUM SERPL-MCNC: 9.2 MG/DL (ref 8.4–10.2)
CHLORIDE SERPL-SCNC: 101 MMOL/L (ref 96–108)
CO2 SERPL-SCNC: 28 MMOL/L (ref 21–32)
CREAT SERPL-MCNC: 0.46 MG/DL (ref 0.6–1.3)
ERYTHROCYTE [DISTWIDTH] IN BLOOD BY AUTOMATED COUNT: 13.2 % (ref 11.6–15.1)
GFR SERPL CREATININE-BSD FRML MDRD: 112 ML/MIN/1.73SQ M
GLUCOSE SERPL-MCNC: 115 MG/DL (ref 65–140)
GLUCOSE SERPL-MCNC: 132 MG/DL (ref 65–140)
GLUCOSE SERPL-MCNC: 90 MG/DL (ref 65–140)
GLUCOSE SERPL-MCNC: 93 MG/DL (ref 65–140)
HCT VFR BLD AUTO: 35.4 % (ref 34.8–46.1)
HGB BLD-MCNC: 11.8 G/DL (ref 11.5–15.4)
MCH RBC QN AUTO: 29.9 PG (ref 26.8–34.3)
MCHC RBC AUTO-ENTMCNC: 33.3 G/DL (ref 31.4–37.4)
MCV RBC AUTO: 90 FL (ref 82–98)
PLATELET # BLD AUTO: 381 THOUSANDS/UL (ref 149–390)
PMV BLD AUTO: 10.5 FL (ref 8.9–12.7)
POTASSIUM SERPL-SCNC: 3.3 MMOL/L (ref 3.5–5.3)
RBC # BLD AUTO: 3.94 MILLION/UL (ref 3.81–5.12)
SODIUM SERPL-SCNC: 139 MMOL/L (ref 135–147)
WBC # BLD AUTO: 10.18 THOUSAND/UL (ref 4.31–10.16)

## 2023-03-20 RX ORDER — INSULIN LISPRO 100 [IU]/ML
1-5 INJECTION, SOLUTION INTRAVENOUS; SUBCUTANEOUS
Status: DISCONTINUED | OUTPATIENT
Start: 2023-03-21 | End: 2023-03-23 | Stop reason: HOSPADM

## 2023-03-20 RX ORDER — POTASSIUM CHLORIDE 20 MEQ/1
40 TABLET, EXTENDED RELEASE ORAL ONCE
Status: COMPLETED | OUTPATIENT
Start: 2023-03-20 | End: 2023-03-20

## 2023-03-20 RX ORDER — INSULIN GLARGINE 100 [IU]/ML
25 INJECTION, SOLUTION SUBCUTANEOUS
Status: DISCONTINUED | OUTPATIENT
Start: 2023-03-21 | End: 2023-03-23 | Stop reason: HOSPADM

## 2023-03-20 RX ADMIN — POLYETHYLENE GLYCOL 3350 17 G: 17 POWDER, FOR SOLUTION ORAL at 09:29

## 2023-03-20 RX ADMIN — METOCLOPRAMIDE HYDROCHLORIDE 10 MG: 5 INJECTION INTRAMUSCULAR; INTRAVENOUS at 03:09

## 2023-03-20 RX ADMIN — SENNOSIDES 17.2 MG: 8.6 TABLET, FILM COATED ORAL at 21:03

## 2023-03-20 RX ADMIN — PANTOPRAZOLE SODIUM 40 MG: 40 INJECTION, POWDER, FOR SOLUTION INTRAVENOUS at 20:31

## 2023-03-20 RX ADMIN — ESCITALOPRAM OXALATE 10 MG: 10 TABLET ORAL at 09:29

## 2023-03-20 RX ADMIN — GABAPENTIN 800 MG: 400 CAPSULE ORAL at 20:31

## 2023-03-20 RX ADMIN — NICOTINE 1 PATCH: 14 PATCH, EXTENDED RELEASE TRANSDERMAL at 09:29

## 2023-03-20 RX ADMIN — POTASSIUM CHLORIDE 40 MEQ: 1500 TABLET, EXTENDED RELEASE ORAL at 18:12

## 2023-03-20 RX ADMIN — HEPARIN SODIUM 5000 UNITS: 5000 INJECTION INTRAVENOUS; SUBCUTANEOUS at 18:12

## 2023-03-20 RX ADMIN — DOCUSATE SODIUM 100 MG: 100 CAPSULE, LIQUID FILLED ORAL at 18:12

## 2023-03-20 RX ADMIN — HYDROMORPHONE HYDROCHLORIDE 1 MG: 1 INJECTION, SOLUTION INTRAMUSCULAR; INTRAVENOUS; SUBCUTANEOUS at 09:29

## 2023-03-20 RX ADMIN — HYDROMORPHONE HYDROCHLORIDE 1 MG: 1 INJECTION, SOLUTION INTRAMUSCULAR; INTRAVENOUS; SUBCUTANEOUS at 20:31

## 2023-03-20 RX ADMIN — GABAPENTIN 800 MG: 400 CAPSULE ORAL at 16:27

## 2023-03-20 RX ADMIN — HYDROMORPHONE HYDROCHLORIDE 1 MG: 1 INJECTION, SOLUTION INTRAMUSCULAR; INTRAVENOUS; SUBCUTANEOUS at 16:27

## 2023-03-20 RX ADMIN — DOCUSATE SODIUM 100 MG: 100 CAPSULE, LIQUID FILLED ORAL at 09:29

## 2023-03-20 RX ADMIN — PANTOPRAZOLE SODIUM 40 MG: 40 INJECTION, POWDER, FOR SOLUTION INTRAVENOUS at 09:29

## 2023-03-20 RX ADMIN — LEVOTHYROXINE SODIUM 25 MCG: 25 TABLET ORAL at 06:17

## 2023-03-20 RX ADMIN — ARIPIPRAZOLE 10 MG: 5 TABLET ORAL at 09:29

## 2023-03-20 RX ADMIN — HEPARIN SODIUM 5000 UNITS: 5000 INJECTION INTRAVENOUS; SUBCUTANEOUS at 11:52

## 2023-03-20 RX ADMIN — HEPARIN SODIUM 5000 UNITS: 5000 INJECTION INTRAVENOUS; SUBCUTANEOUS at 02:42

## 2023-03-20 RX ADMIN — TRAZODONE HYDROCHLORIDE 200 MG: 100 TABLET ORAL at 21:03

## 2023-03-20 RX ADMIN — LIDOCAINE 5% 1 PATCH: 700 PATCH TOPICAL at 09:29

## 2023-03-20 RX ADMIN — HYDROMORPHONE HYDROCHLORIDE 1 MG: 1 INJECTION, SOLUTION INTRAMUSCULAR; INTRAVENOUS; SUBCUTANEOUS at 01:36

## 2023-03-20 RX ADMIN — GABAPENTIN 800 MG: 400 CAPSULE ORAL at 09:29

## 2023-03-20 NOTE — ASSESSMENT & PLAN NOTE
· Presents with abdominal pain, distension, N/V progressing over past week  Notes last BM 5 days prior  Not passing flatus  Not improved with home Miralax and prune juice  · With admission for same in January  Constipation appears to continue to be driving factor of symptoms  Now on daily Miralax without improvement  Consider addition of Senna once taking PO  · Advised for GI follow-up and colonoscopy after discharge  · CT abd/pelvis: findings concerning for gastritis  Fluid-filled colon and multiple small bowel loops but no wall thickening or hyperemia suggestive of laxative use  · AFVSS  WBC 11 29  Tachycardia improving with IVF  Lactic 3 0  · Remove NG tube as patient abdominal pain has improved  Patient had a liquid bowel movement  · Will start on clear liquid diet as patient has loose stools since the last night after enema  · Advance to full liquid diet and tolerated well and will start on regular diet    ·   · IV Protonix for gastritis   · Case d/w general surg in ED recommending NGT placement  · General surg consult appreciated

## 2023-03-20 NOTE — PLAN OF CARE
Problem: GASTROINTESTINAL - ADULT  Goal: Minimal or absence of nausea and/or vomiting  Description: INTERVENTIONS:  - Administer IV fluids if ordered to ensure adequate hydration  - Maintain NPO status until nausea and vomiting are resolved  - Nasogastric tube if ordered  - Administer ordered antiemetic medications as needed  - Provide nonpharmacologic comfort measures as appropriate  - Advance diet as tolerated, if ordered  - Consider nutrition services referral to assist patient with adequate nutrition and appropriate food choices  Outcome: Progressing     Problem: GASTROINTESTINAL - ADULT  Goal: Maintains or returns to baseline bowel function  Description: INTERVENTIONS:  - Assess bowel function  - Encourage oral fluids to ensure adequate hydration  - Administer IV fluids if ordered to ensure adequate hydration  - Administer ordered medications as needed  - Encourage mobilization and activity  - Consider nutritional services referral to assist patient with adequate nutrition and appropriate food choices  Outcome: Progressing     Problem: GASTROINTESTINAL - ADULT  Goal: Maintains adequate nutritional intake  Description: INTERVENTIONS:  - Monitor percentage of each meal consumed  - Identify factors contributing to decreased intake, treat as appropriate  - Assist with meals as needed  - Monitor I&O, weight, and lab values if indicated  - Obtain nutrition services referral as needed  Outcome: Progressing     Problem: PAIN - ADULT  Goal: Verbalizes/displays adequate comfort level or baseline comfort level  Description: Interventions:  - Encourage patient to monitor pain and request assistance  - Assess pain using appropriate pain scale  - Administer analgesics based on type and severity of pain and evaluate response  - Implement non-pharmacological measures as appropriate and evaluate response  - Consider cultural and social influences on pain and pain management  - Notify physician/advanced practitioner if interventions unsuccessful or patient reports new pain  Outcome: Progressing     Problem: INFECTION - ADULT  Goal: Absence or prevention of progression during hospitalization  Description: INTERVENTIONS:  - Assess and monitor for signs and symptoms of infection  - Monitor lab/diagnostic results  - Monitor all insertion sites, i e  indwelling lines, tubes, and drains  - Monitor endotracheal if appropriate and nasal secretions for changes in amount and color  - [place] appropriate cooling/warming therapies per order  - Administer medications as ordered  - Instruct and encourage patient and family to use good hand hygiene technique  - Identify and instruct in appropriate isolation precautions for identified infection/condition  Outcome: Progressing     Problem: INFECTION - ADULT  Goal: Absence of fever/infection during neutropenic period  Description: INTERVENTIONS:  - Monitor WBC    Outcome: Progressing     Problem: SAFETY ADULT  Goal: Patient will remain free of falls  Description: INTERVENTIONS:  - Educate patient/family on patient safety including physical limitations  - Instruct patient to call for assistance with activity   - Consult OT/PT to assist with strengthening/mobility   - Keep Call bell within reach  - Keep bed low and locked with side rails adjusted as appropriate  - Keep care items and personal belongings within reach  - Initiate and maintain comfort rounds  - Make Fall Risk Sign visible to staff  - Offer Toileting every 3 Hours, in advance of need  - Initiate/Maintain bed alarm  - Obtain necessary fall risk management equipment:   - Apply yellow socks and bracelet for high fall risk patients  - Consider moving patient to room near nurses station  Outcome: Progressing     Problem: SAFETY ADULT  Goal: Maintain or return to baseline ADL function  Description: INTERVENTIONS:  -  Assess patient's ability to carry out ADLs; assess patient's baseline for ADL function and identify physical deficits which impact ability to perform ADLs (bathing, care of mouth/teeth, toileting, grooming, dressing, etc )  - Assess/evaluate cause of self-care deficits   - Assess range of motion  - Assess patient's mobility; develop plan if impaired  - Assess patient's need for assistive devices and provide as appropriate  - Encourage maximum independence but intervene and supervise when necessary  - Involve family in performance of ADLs  - Assess for home care needs following discharge   - Consider OT consult to assist with ADL evaluation and planning for discharge  - Provide patient education as appropriate  Outcome: Progressing     Problem: SAFETY ADULT  Goal: Maintains/Returns to pre admission functional level  Description: INTERVENTIONS:  - Perform BMAT or MOVE assessment daily    - Set and communicate daily mobility goal to care team and patient/family/caregiver  - Collaborate with rehabilitation services on mobility goals if consulted  - Perform Range of Motion  times a day  - Reposition patient every  hours    - Dangle patient  times a day  - Stand patient  times a day  - Ambulate patient  times a day  - Out of bed to chair  times a day   - Out of bed for meals times a day  - Out of bed for toileting  - Record patient progress and toleration of activity level   Outcome: Progressing     Problem: DISCHARGE PLANNING  Goal: Discharge to home or other facility with appropriate resources  Description: INTERVENTIONS:  - Identify barriers to discharge w/patient and caregiver  - Arrange for needed discharge resources and transportation as appropriate  - Identify discharge learning needs (meds, wound care, etc )  - Arrange for interpretive services to assist at discharge as needed  - Refer to Case Management Department for coordinating discharge planning if the patient needs post-hospital services based on physician/advanced practitioner order or complex needs related to functional status, cognitive ability, or social support system  Outcome: Progressing     Problem: Knowledge Deficit  Goal: Patient/family/caregiver demonstrates understanding of disease process, treatment plan, medications, and discharge instructions  Description: Complete learning assessment and assess knowledge base    Interventions:  - Provide teaching at level of understanding  - Provide teaching via preferred learning methods  Outcome: Progressing

## 2023-03-20 NOTE — PROGRESS NOTES
"Progress Note - General Surgery   Jose G Alexandre 56 y o  female MRN: [id number]  Unit/Bed#: -01 Encounter: [id number]    Assessment/Plan    55 y/o female with multiple prior abdominal surgeries p/w abdominal pain, constipation, N/V  Possible gastroenteritis  HD#4  Pt reporting continued 5/10 abdominal pain, worst in RUQ  Abdomen soft, non-distended, tender to palpation throughout, normal BS  Tachycardia on exam  Pt is s/p cholecystectomy, appendectomy, hysterectomy, bowel resection  KUB 3/18 negative for obstruction  Pt had one liquid BM yesterday after enema  Denies passing gas  NGT discontinued yesterday - pt reports nausea yesterday even while NGT was in place, but no vomiting, improved today  Tolerating CLD  Afebrile, tachycardia (90s-110s), remainder of vitals stable  K 3 3 (3 6)  WBC 10 18 (10 66)    Replete K  No surgical needs at this time  Advance diet as tolerated  Recommend continuing bowel regimen upon discharge to prevent constipation  General surgery will sign off; re-consult as needed  D/W attending      /67 (BP Location: Left arm)   Pulse 95   Temp 98 4 °F (36 9 °C) (Oral)   Resp 15   Ht 5' 3\" (1 6 m)   Wt 77 7 kg (171 lb 6 4 oz)   SpO2 96%   BMI 30 36 kg/m²     Labs in chart were reviewed  Results from last 7 days   Lab Units 03/20/23  0458   WBC Thousand/uL 10 18*   HEMOGLOBIN g/dL 11 8   HEMATOCRIT % 35 4   PLATELETS Thousands/uL 381     Results from last 7 days   Lab Units 03/20/23  0458   POTASSIUM mmol/L 3 3*   CHLORIDE mmol/L 101   CO2 mmol/L 28   BUN mg/dL 4*   CREATININE mg/dL 0 46*           Intake/Output Summary (Last 24 hours) at 3/20/2023 0816  Last data filed at 3/20/2023 0401  Gross per 24 hour   Intake 420 ml   Output 100 ml   Net 320 ml           Subjective/Objective     Subjective: Pt seen and examined  No acute overnight events  Pt continues to c/o abdominal pain however it is improving   Reports some nausea yesterday prior to NGT removal  Liquid BM yesterday " with enema, but nothing since  Review of Systems   Constitutional: Negative for chills and fever  Respiratory: Negative for shortness of breath  Cardiovascular: Negative for chest pain  Gastrointestinal: Positive for abdominal pain, constipation and nausea  Negative for abdominal distention and vomiting  Genitourinary: Negative for difficulty urinating and dysuria  Objective:     Physical Exam  Vitals and nursing note reviewed  Constitutional:       General: She is not in acute distress  Appearance: She is not toxic-appearing  HENT:      Head: Normocephalic and atraumatic  Eyes:      Extraocular Movements: Extraocular movements intact  Cardiovascular:      Rate and Rhythm: Regular rhythm  Tachycardia present  Heart sounds: Normal heart sounds  Pulmonary:      Effort: Pulmonary effort is normal  No respiratory distress  Abdominal:      General: A surgical scar is present  Bowel sounds are normal  There is no distension  Palpations: Abdomen is soft  Tenderness: There is generalized abdominal tenderness and tenderness in the right upper quadrant and epigastric area  There is no guarding  Musculoskeletal:         General: Normal range of motion  Cervical back: Normal range of motion  Skin:     General: Skin is warm and dry  Neurological:      Mental Status: She is alert and oriented to person, place, and time  Psychiatric:         Mood and Affect: Mood normal          Behavior: Behavior normal          Thought Content:  Thought content normal             Zeynep Dee PA-C  3/20/2023

## 2023-03-20 NOTE — PLAN OF CARE
Problem: GASTROINTESTINAL - ADULT  Goal: Minimal or absence of nausea and/or vomiting  Description: INTERVENTIONS:  - Administer IV fluids if ordered to ensure adequate hydration  - Maintain NPO status until nausea and vomiting are resolved  - Nasogastric tube if ordered  - Administer ordered antiemetic medications as needed  - Provide nonpharmacologic comfort measures as appropriate  - Advance diet as tolerated, if ordered  - Consider nutrition services referral to assist patient with adequate nutrition and appropriate food choices  Outcome: Progressing  Goal: Maintains or returns to baseline bowel function  Description: INTERVENTIONS:  - Assess bowel function  - Encourage oral fluids to ensure adequate hydration  - Administer IV fluids if ordered to ensure adequate hydration  - Administer ordered medications as needed  - Encourage mobilization and activity  - Consider nutritional services referral to assist patient with adequate nutrition and appropriate food choices  Outcome: Progressing  Goal: Maintains adequate nutritional intake  Description: INTERVENTIONS:  - Monitor percentage of each meal consumed  - Identify factors contributing to decreased intake, treat as appropriate  - Assist with meals as needed  - Monitor I&O, weight, and lab values if indicated  - Obtain nutrition services referral as needed  Outcome: Progressing     Problem: PAIN - ADULT  Goal: Verbalizes/displays adequate comfort level or baseline comfort level  Description: Interventions:  - Encourage patient to monitor pain and request assistance  - Assess pain using appropriate pain scale  - Administer analgesics based on type and severity of pain and evaluate response  - Implement non-pharmacological measures as appropriate and evaluate response  - Consider cultural and social influences on pain and pain management  - Notify physician/advanced practitioner if interventions unsuccessful or patient reports new pain  Outcome: Progressing Problem: INFECTION - ADULT  Goal: Absence or prevention of progression during hospitalization  Description: INTERVENTIONS:  - Assess and monitor for signs and symptoms of infection  - Monitor lab/diagnostic results  - Monitor all insertion sites, i e  indwelling lines, tubes, and drains  - Monitor endotracheal if appropriate and nasal secretions for changes in amount and color  - [place] appropriate cooling/warming therapies per order  - Administer medications as ordered  - Instruct and encourage patient and family to use good hand hygiene technique  - Identify and instruct in appropriate isolation precautions for identified infection/condition  Outcome: Progressing  Goal: Absence of fever/infection during neutropenic period  Description: INTERVENTIONS:  - Monitor WBC    Outcome: Progressing     Problem: SAFETY ADULT  Goal: Patient will remain free of falls  Description: INTERVENTIONS:  - Educate patient/family on patient safety including physical limitations  - Instruct patient to call for assistance with activity   - Consult OT/PT to assist with strengthening/mobility   - Keep Call bell within reach  - Keep bed low and locked with side rails adjusted as appropriate  - Keep care items and personal belongings within reach  - Initiate and maintain comfort rounds  - Make Fall Risk Sign visible to staff  - Offer Toileting every 2 Hours, in advance of need  - Initiate/Maintain bed alarm  - Obtain necessary fall risk management equipment:   - Apply yellow socks and bracelet for high fall risk patients  - Consider moving patient to room near nurses station  Outcome: Progressing  Goal: Maintain or return to baseline ADL function  Description: INTERVENTIONS:  -  Assess patient's ability to carry out ADLs; assess patient's baseline for ADL function and identify physical deficits which impact ability to perform ADLs (bathing, care of mouth/teeth, toileting, grooming, dressing, etc )  - Assess/evaluate cause of self-care deficits   - Assess range of motion  - Assess patient's mobility; develop plan if impaired  - Assess patient's need for assistive devices and provide as appropriate  - Encourage maximum independence but intervene and supervise when necessary  - Involve family in performance of ADLs  - Assess for home care needs following discharge   - Consider OT consult to assist with ADL evaluation and planning for discharge  - Provide patient education as appropriate  Outcome: Progressing  Goal: Maintains/Returns to pre admission functional level  Description: INTERVENTIONS:  - Perform BMAT or MOVE assessment daily    - Set and communicate daily mobility goal to care team and patient/family/caregiver  - Collaborate with rehabilitation services on mobility goals if consulted  - Perform Range of Motion 3 times a day  - Reposition patient every 2 hours    - Dangle patient 3 times a day  - Stand patient 3 times a day  - Ambulate patient 3 times a day  - Out of bed to chair 3 times a day   - Out of bed for meals 3 times a day  - Out of bed for toileting  - Record patient progress and toleration of activity level   Outcome: Progressing     Problem: DISCHARGE PLANNING  Goal: Discharge to home or other facility with appropriate resources  Description: INTERVENTIONS:  - Identify barriers to discharge w/patient and caregiver  - Arrange for needed discharge resources and transportation as appropriate  - Identify discharge learning needs (meds, wound care, etc )  - Arrange for interpretive services to assist at discharge as needed  - Refer to Case Management Department for coordinating discharge planning if the patient needs post-hospital services based on physician/advanced practitioner order or complex needs related to functional status, cognitive ability, or social support system  Outcome: Progressing     Problem: Knowledge Deficit  Goal: Patient/family/caregiver demonstrates understanding of disease process, treatment plan, medications, and discharge instructions  Description: Complete learning assessment and assess knowledge base    Interventions:  - Provide teaching at level of understanding  - Provide teaching via preferred learning methods  Outcome: Progressing

## 2023-03-20 NOTE — PROGRESS NOTES
[facility]  68   Progress Note Edouard Gann 1965, 56 y o  female MRN: [id number]  Unit/Bed#: -01 Encounter: [id number]  Primary Care Provider: Luís Wright MD   Date and time admitted to hospital: 3/17/2023  8:37 PM    Cigarette smoker  Assessment & Plan  · NRT ordered    Diabetes mellitus type 2 in obese [facility])  Assessment & Plan  Lab Results   Component Value Date    HGBA1C 5 5 03/17/2023       Recent Labs     03/19/23  1945 03/20/23  0022 03/20/23  1138 03/20/23  1626   POCGLU 111 93 132 115       Blood Sugar Average: Last 72 hrs:  (P) 207 [id number]   · Update A1c  · Hold metformin  · Continue lantus 25 units h s   · SSI and acu-checks q6 while NPO    Lactic acidosis  Assessment & Plan  · Lactic 3 0  AG 14  · Suspect due to dehydration with ongoing vomiting and decreased PO intake  · Continue IVF hydration  · Trend lactic/BMP    Generalized anxiety disorder  Assessment & Plan  · Hold home clonazepam, Abilify, Lexapro, trazodone while n p o   · IV Ativan as needed for now    Essential hypertension  Assessment & Plan  · Home HCTZ and Lopressor held while NPO  · IV hydralazine p r n    * Abdominal pain  Assessment & Plan  · Presents with abdominal pain, distension, N/V progressing over past week  Notes last BM 5 days prior  Not passing flatus  Not improved with home Miralax and prune juice  · With admission for same in January  Constipation appears to continue to be driving factor of symptoms  Now on daily Miralax without improvement  Consider addition of Senna once taking PO  · Advised for GI follow-up and colonoscopy after discharge  · CT abd/pelvis: findings concerning for gastritis  Fluid-filled colon and multiple small bowel loops but no wall thickening or hyperemia suggestive of laxative use  · AFVSS  WBC 11 29  Tachycardia improving with IVF  Lactic 3 0  · Remove NG tube as patient abdominal pain has improved  Patient had a liquid bowel movement    · Will start on clear liquid diet as patient has loose stools since the last night after enema  · Advance to full liquid diet and tolerated well and will start on regular diet  ·   · IV Protonix for gastritis   · Case d/w general surg in ED recommending NGT placement  · General surg consult appreciated       Lactic acid 3 0> 1 5> 0 9  Lactate levels greater than 2 mmol/L represent hyperlactatemia, whereas lactic acidosis is generally defined as a serum lactate concentration above 4 mmol/L       VTE Pharmacologic Prophylaxis: VTE Score: 3 Moderate Risk (Score 3-4) - Pharmacological DVT Prophylaxis Ordered: heparin  Patient Centered Rounds: I performed bedside rounds with nursing staff today  Discussions with Specialists or Other Care Team Provider: staff    Education and Discussions with Family / Patient: Updated  () via phone  Total Time Spent on Date of Encounter in care of patient: 55 minutes This time was spent on one or more of the following: performing physical exam; counseling and coordination of care; obtaining or reviewing history; documenting in the medical record; reviewing/ordering tests, medications or procedures; communicating with other healthcare professionals and discussing with patient's family/caregivers  Current Length of Stay: 2 day(s)  Current Patient Status: Inpatient   Certification Statement: The patient will continue to require additional inpatient hospital stay due to Abdominal pain, constipation  Discharge Plan: Anticipate discharge tomorrow to home  Code Status: Level 1 - Full Code    Subjective:   Patient is feeling better  Tolerating liquid diet and will advance to full liquid diet  Denies of nausea or vomiting  Has not had any bowel movement since Saturday which was liquid stool after the enema      Objective:     Vitals:   Temp (24hrs), Av 7 °F (37 1 °C), Min:98 3 °F (36 8 °C), Max:99 5 °F (37 5 °C)    Temp:  [98 3 °F (36 8 °C)-99 5 °F (37 5 °C)] 98 6 °F (37 °C)  HR:  [] 83  Resp:  [15-18] 18  BP: (102-128)/(62-79) 128/65  SpO2:  [96 %-99 %] 96 %  Body mass index is 30 36 kg/m²  Input and Output Summary (last 24 hours): Intake/Output Summary (Last 24 hours) at 3/20/2023 1724  Last data filed at 3/20/2023 0830  Gross per 24 hour   Intake 770 ml   Output --   Net 770 ml       Physical Exam:   Physical Exam   HEENT-PERRLA, moist oral mucosa  Neck-supple, no JVD elevation   Respiratory-equal air entry bilaterally, no rales or rhonchi  Cardiovascular system-S1, S2 heard, no murmur or gallops or rubs  Abdomen-soft, nontender, no guarding or rigidity, bowel sounds heard  Extremities-no pedal edema  Peripheral pulses palpable  Musculoskeletal-no contractures  Central nervous system-no acute focal neurological deficit ,no sensory or motor deficit noted  Skin-no rash noted        Additional Data:     Labs:  Results from last 7 days   Lab Units 03/20/23  0458 03/19/23  0544 03/18/23  1946   WBC Thousand/uL 10 18*   < > 11 25*   HEMOGLOBIN g/dL 11 8   < > 12 7   HEMATOCRIT % 35 4   < > 37 7   PLATELETS Thousands/uL 381   < > 357   NEUTROS PCT %  --   --  69   LYMPHS PCT %  --   --  21   MONOS PCT %  --   --  9   EOS PCT %  --   --  1    < > = values in this interval not displayed       Results from last 7 days   Lab Units 03/20/23  0458 03/19/23  0544   SODIUM mmol/L 139 142   POTASSIUM mmol/L 3 3* 3 6   CHLORIDE mmol/L 101 104   CO2 mmol/L 28 25   BUN mg/dL 4* 3*   CREATININE mg/dL 0 46* 0 52*   ANION GAP mmol/L 10 13   CALCIUM mg/dL 9 2 9 3   ALBUMIN g/dL  --  3 9   TOTAL BILIRUBIN mg/dL  --  0 99   ALK PHOS U/L  --  150*   ALT U/L  --  97*   AST U/L  --  44*   GLUCOSE RANDOM mg/dL 90 139         Results from last 7 days   Lab Units 03/20/23  1626 03/20/23  1138 03/20/23  0022 03/19/23  1945 03/19/23  1710 03/19/23  1141 03/19/23  0833 03/19/23  0207 03/18/23  2120 03/18/23  1732 03/18/23  1154 03/18/23  0559   POC GLUCOSE mg/dl 115 132 93 111 102 115 99 96 112 104 118 100     Results from last 7 days   Lab Units 03/17/23 2123   HEMOGLOBIN A1C % 5 5     Results from last 7 days   Lab Units 03/18/23  1946 03/18/23  0201 03/17/23 2123   LACTIC ACID mmol/L 0 9 1 5 3 0*       Lines/Drains:  Invasive Devices     Peripheral Intravenous Line  Duration           Peripheral IV 03/20/23 Right Antecubital <1 day                      Imaging: Reviewed radiology reports from this admission including: xray(s)    Recent Cultures (last 7 days):         Last 24 Hours Medication List:   Current Facility-Administered Medications   Medication Dose Route Frequency Provider Last Rate   • ARIPiprazole  10 mg Oral Daily Wanda Jimenez MD     • clonazePAM  0 5 mg Oral BID PRN Wanda Jimenez MD     • docusate sodium  100 mg Oral BID Wanda Jimenez MD     • escitalopram  10 mg Oral Daily Wanda Jimenez MD     • gabapentin  800 mg Oral TID Wanda Jimenez MD     • heparin (porcine)  5,000 Units Subcutaneous Q8H [facility] Sarah Marrero PA-C     • hydrALAZINE  5 mg Intravenous Q6H PRN Sarah Marrero PA-C     • HYDROmorphone  1 mg Intravenous Q4H PRN Wanda Jimenez MD     • insulin glargine  25 Units Subcutaneous HS Sarah Marrero PA-C     • insulin lispro  1-6 Units Subcutaneous Q6H [facility] Sarah Marrero PA-C     • levothyroxine  25 mcg Oral Early Morning Maris Murphy MD     • lidocaine  1 patch Topical Daily Maris Murphy MD     • LORazepam  1 mg Intravenous Q4H PRN Wanda Jimenez MD     • metoclopramide  10 mg Intravenous Q6H PRN Wanda Jimenez MD     • metoprolol  5 mg Intravenous Q6H PRN Wanda Jimenez MD     • nicotine  1 patch Transdermal Daily Sarah Marrero PA-C     • pantoprazole  40 mg Intravenous Q12H [facility] Sarah Marrero PA-C     • polyethylene glycol  17 g Oral Daily Maris Murphy MD     • senna  2 tablet Oral HS Marsi Dove MD     • sodium chloride  1,000 mL Intravenous Once Sim MD Nick Stopped (03/18/23 8103)   • SUMAtriptan  50 mg Oral Once PRN Pia Al MD     • traZODone  200 mg Oral HS Pia Al MD          Today, Patient Was Seen By: Pia Al MD    **Please Note: This note may have been constructed using a voice recognition system  **

## 2023-03-20 NOTE — ASSESSMENT & PLAN NOTE
Lab Results   Component Value Date    HGBA1C 5 5 03/17/2023       Recent Labs     03/19/23  1945 03/20/23  0022 03/20/23  1138 03/20/23  1626   POCGLU 111 93 132 115       Blood Sugar Average: Last 72 hrs:  (P) 361 [id number]   · Update A1c  · Hold metformin  · Continue lantus 25 units h s   · SSI and acu-checks q6 while NPO

## 2023-03-21 LAB
GLUCOSE SERPL-MCNC: 114 MG/DL (ref 65–140)
GLUCOSE SERPL-MCNC: 132 MG/DL (ref 65–140)
GLUCOSE SERPL-MCNC: 184 MG/DL (ref 65–140)
GLUCOSE SERPL-MCNC: 92 MG/DL (ref 65–140)

## 2023-03-21 RX ORDER — BISACODYL 5 MG/1
20 TABLET, DELAYED RELEASE ORAL ONCE
Status: COMPLETED | OUTPATIENT
Start: 2023-03-21 | End: 2023-03-21

## 2023-03-21 RX ORDER — POLYETHYLENE GLYCOL 3350 17 G/17G
238 POWDER, FOR SOLUTION ORAL ONCE
Status: COMPLETED | OUTPATIENT
Start: 2023-03-21 | End: 2023-03-21

## 2023-03-21 RX ADMIN — ARIPIPRAZOLE 10 MG: 5 TABLET ORAL at 09:25

## 2023-03-21 RX ADMIN — HEPARIN SODIUM 5000 UNITS: 5000 INJECTION INTRAVENOUS; SUBCUTANEOUS at 03:31

## 2023-03-21 RX ADMIN — GABAPENTIN 800 MG: 400 CAPSULE ORAL at 09:24

## 2023-03-21 RX ADMIN — BISACODYL 20 MG: 5 TABLET, COATED ORAL at 17:08

## 2023-03-21 RX ADMIN — TRAZODONE HYDROCHLORIDE 200 MG: 100 TABLET ORAL at 21:39

## 2023-03-21 RX ADMIN — HYDROMORPHONE HYDROCHLORIDE 1 MG: 1 INJECTION, SOLUTION INTRAMUSCULAR; INTRAVENOUS; SUBCUTANEOUS at 09:19

## 2023-03-21 RX ADMIN — HEPARIN SODIUM 5000 UNITS: 5000 INJECTION INTRAVENOUS; SUBCUTANEOUS at 18:07

## 2023-03-21 RX ADMIN — HYDROMORPHONE HYDROCHLORIDE 1 MG: 1 INJECTION, SOLUTION INTRAMUSCULAR; INTRAVENOUS; SUBCUTANEOUS at 23:37

## 2023-03-21 RX ADMIN — POLYETHYLENE GLYCOL 3350 17 G: 17 POWDER, FOR SOLUTION ORAL at 09:36

## 2023-03-21 RX ADMIN — INSULIN GLARGINE 25 UNITS: 100 INJECTION, SOLUTION SUBCUTANEOUS at 21:39

## 2023-03-21 RX ADMIN — METOCLOPRAMIDE HYDROCHLORIDE 10 MG: 5 INJECTION INTRAMUSCULAR; INTRAVENOUS at 09:43

## 2023-03-21 RX ADMIN — HYDROMORPHONE HYDROCHLORIDE 1 MG: 1 INJECTION, SOLUTION INTRAMUSCULAR; INTRAVENOUS; SUBCUTANEOUS at 03:30

## 2023-03-21 RX ADMIN — NICOTINE 1 PATCH: 14 PATCH, EXTENDED RELEASE TRANSDERMAL at 09:36

## 2023-03-21 RX ADMIN — GABAPENTIN 800 MG: 400 CAPSULE ORAL at 17:08

## 2023-03-21 RX ADMIN — DOCUSATE SODIUM 100 MG: 100 CAPSULE, LIQUID FILLED ORAL at 17:08

## 2023-03-21 RX ADMIN — HEPARIN SODIUM 5000 UNITS: 5000 INJECTION INTRAVENOUS; SUBCUTANEOUS at 11:53

## 2023-03-21 RX ADMIN — ESCITALOPRAM OXALATE 10 MG: 10 TABLET ORAL at 09:25

## 2023-03-21 RX ADMIN — LEVOTHYROXINE SODIUM 25 MCG: 25 TABLET ORAL at 05:51

## 2023-03-21 RX ADMIN — GABAPENTIN 800 MG: 400 CAPSULE ORAL at 21:39

## 2023-03-21 RX ADMIN — METOCLOPRAMIDE HYDROCHLORIDE 10 MG: 5 INJECTION INTRAMUSCULAR; INTRAVENOUS at 18:04

## 2023-03-21 RX ADMIN — DOCUSATE SODIUM 100 MG: 100 CAPSULE, LIQUID FILLED ORAL at 09:25

## 2023-03-21 RX ADMIN — LIDOCAINE 5% 1 PATCH: 700 PATCH TOPICAL at 09:19

## 2023-03-21 RX ADMIN — SENNOSIDES 17.2 MG: 8.6 TABLET, FILM COATED ORAL at 21:39

## 2023-03-21 RX ADMIN — PANTOPRAZOLE SODIUM 40 MG: 40 INJECTION, POWDER, FOR SOLUTION INTRAVENOUS at 21:39

## 2023-03-21 RX ADMIN — INSULIN LISPRO 1 UNITS: 100 INJECTION, SOLUTION INTRAVENOUS; SUBCUTANEOUS at 21:39

## 2023-03-21 RX ADMIN — METOCLOPRAMIDE HYDROCHLORIDE 10 MG: 5 INJECTION INTRAMUSCULAR; INTRAVENOUS at 03:51

## 2023-03-21 RX ADMIN — PANTOPRAZOLE SODIUM 40 MG: 40 INJECTION, POWDER, FOR SOLUTION INTRAVENOUS at 09:25

## 2023-03-21 RX ADMIN — HYDROMORPHONE HYDROCHLORIDE 1 MG: 1 INJECTION, SOLUTION INTRAMUSCULAR; INTRAVENOUS; SUBCUTANEOUS at 14:49

## 2023-03-21 RX ADMIN — POLYETHYLENE GLYCOL 3350 238 G: 17 POWDER, FOR SOLUTION ORAL at 17:08

## 2023-03-21 RX ADMIN — HYDROMORPHONE HYDROCHLORIDE 1 MG: 1 INJECTION, SOLUTION INTRAMUSCULAR; INTRAVENOUS; SUBCUTANEOUS at 18:58

## 2023-03-21 NOTE — PLAN OF CARE
Pt A&Ox4 - ambulating in room and hallway  Nausea and abdominal pain managed with reglan and dilaudid per MAR  Bowel regimen given per MAR

## 2023-03-21 NOTE — CONSULTS
Consultation -[facility]   Libertad Shoaib 56 y o  female MRN: [id number]    Unit/Bed#: -01 Encounter: [id number]\      Physician Requesting Consult: Dr Daljit Li     Reason for Consult / Principal Problem: Gastritis abdominal pain    HPI: This is a 56-year-old female who presents with abdominal pain  Does have history of constipation and nausea and vomiting and multiple abdominal surgeries and was seen by surgery and patient did have NG tube in place and she was admitted on 3/17  Does have history of cholecystectomy appendectomy hysterectomy and bowel resection  KUB was performed last on 3/18/2023 which had shown nonspecific bowel gas pattern  CAT scan on admission did show findings concerning for gastritis and multiple small bowel loops and a fluid-filled colon but no wall thickening  Patient did have colonoscopy in system from 2014 which did reveal a colon polyp and internal hemorrhoids and the polyp was probable tubular adenoma and recommended repeat colonoscopy in 5 years  Patient did have random biopsies at that time that she was having diarrhea for microscopic colitis which were negative  Patient does have history of posttraumatic stress disorder as well as bipolar disorder diabetes and hypertension as well as hypothyroidism  Patient is still complaining of right upper quadrant abdominal pain with poor p o  intake  She reports she believes her last colonoscopy was in 2014 which was in the system  Patient reports that she has lost weight  Denies any significant reflux symptoms but does have history of GERD in the chart  EGD was performed in 2018 which did showed mild gastritis  Biopsy of the duodenum was negative for celiac at that time and biopsy of stomach did not show any H  Pylori  Patient still is reporting abdominal pain and lack of bowel movement and she is reports that she only had results after she was given an enema over the weekend    Patient reports no appetite for the past 1 and half weeks  Allergies:    Allergies   Allergen Reactions   • Losartan Cough   • Latex Rash       Medications:  Current Facility-Administered Medications:   •  ARIPiprazole (ABILIFY) tablet 10 mg, 10 mg, Oral, Daily, Yumiko Metzger MD, 10 mg at 03/21/23 6666  •  clonazePAM (KlonoPIN) tablet 0 5 mg, 0 5 mg, Oral, BID PRN, Yumiko Metzger MD  •  docusate sodium (COLACE) capsule 100 mg, 100 mg, Oral, BID, Yumiko Meztger MD, 100 mg at 03/21/23 5686  •  escitalopram (LEXAPRO) tablet 10 mg, 10 mg, Oral, Daily, Yumiko Metzger MD, 10 mg at 03/21/23 2980  •  gabapentin (NEURONTIN) capsule 800 mg, 800 mg, Oral, TID, Yumiko Metzger MD, 800 mg at 03/21/23 6398  •  heparin (porcine) subcutaneous injection 5,000 Units, 5,000 Units, Subcutaneous, Q8H [address], Sarah Bowden PA-C, 5,000 Units at 03/21/23 8631  •  hydrALAZINE (APRESOLINE) injection 5 mg, 5 mg, Intravenous, Q6H PRN, Viktoriya Rangel PA-C  •  HYDROmorphone (DILAUDID) injection 1 mg, 1 mg, Intravenous, Q4H PRN, Yumiko Metzger MD, 1 mg at 03/21/23 0919  •  insulin glargine (LANTUS) subcutaneous injection 25 Units 0 25 mL, 25 Units, Subcutaneous, HS, Taniya Goldstein PA-C  •  insulin lispro (HumaLOG) 100 units/mL subcutaneous injection 1-5 Units, 1-5 Units, Subcutaneous, TID AC **AND** Fingerstick Glucose (POCT), , , TID AC, Taniya Goldstein PA-C  •  insulin lispro (HumaLOG) 100 units/mL subcutaneous injection 1-5 Units, 1-5 Units, Subcutaneous, HS, Taniya Goldstein PA-C  •  levothyroxine tablet 25 mcg, 25 mcg, Oral, Early Morning, Yumiko Metzger MD, 25 mcg at 03/21/23 0551  •  lidocaine (LIDODERM) 5 % patch 1 patch, 1 patch, Topical, Daily, Yumiko Metzger MD, 1 patch at 03/21/23 0919  •  LORazepam (ATIVAN) injection 1 mg, 1 mg, Intravenous, Q4H PRN, Yumiko Metzger MD, 1 mg at 03/19/23 0529  •  metoclopramide (REGLAN) injection 10 mg, 10 mg, Intravenous, Q6H PRN, Susan Low MD, 10 mg at 03/21/23 0943  •  metoprolol (LOPRESSOR) injection 5 mg, 5 mg, Intravenous, Q6H PRN, Susan Low MD, 5 mg at 03/18/23 1834  •  nicotine (NICODERM CQ) 14 mg/24hr TD 24 hr patch 1 patch, 1 patch, Transdermal, Daily, Sarah Shipley PA-C, 1 patch at 03/21/23 9557  •  pantoprazole (PROTONIX) injection 40 mg, 40 mg, Intravenous, Q12H [address], Sarah Shipley PA-C, 40 mg at 03/21/23 3437  •  polyethylene glycol (MIRALAX) packet 17 g, 17 g, Oral, Daily, Susan Low MD, 17 g at 03/21/23 2201  •  senna (SENOKOT) tablet 17 2 mg, 2 tablet, Oral, HS, Susan Low MD, 17 2 mg at 03/20/23 2103  •  sodium chloride 0 9 % bolus 1,000 mL, 1,000 mL, Intravenous, Once, Chanel Avalos MD, Held at 03/18/23 0108  •  SUMAtriptan (IMITREX) tablet 50 mg, 50 mg, Oral, Once PRN, Susan Low MD, 50 mg at 03/19/23 1631  •  traZODone (DESYREL) tablet 200 mg, 200 mg, Oral, HS, Susan Low MD, 200 mg at 03/20/23 2103    Past Medical history:  Past Medical History:   Diagnosis Date   • Addiction to drug [facility])    • Adjustment disorder    • Alcohol abuse    • Alcoholism ([place] [address] )    • Anxiety    • Bipolar disorder ([place] [address] )    • Bowel obstruction ([place] [address] )    • Depression    • Diabetes type 2, controlled ([place] [address] )    • Disease of thyroid gland    • Gastritis    • Head injury    • Heart palpitations    • Hyperlipidemia    • Hypertension    • Hypothyroidism    • Psychiatric illness    • PTSD (post-traumatic stress disorder)    • Seizure ([place] [address] )    • Seizures ([place] [address] )    • Sleep difficulties    • Substance abuse ([place] [address] )    • Suicide attempt ([address] )    • Withdrawal symptoms, drug or narcotic ([facility] [address] )        Past Surgical History:   Past Surgical History:   Procedure Laterality Date   • ABDOMINAL SURGERY     • APPENDECTOMY     • BOWEL RESECTION     • CHOLECYSTECTOMY     • ESOPHAGOGASTRODUODENOSCOPY N/A 05/18/2018 "Procedure: ESOPHAGOGASTRODUODENOSCOPY (EGD) with bx;  Surgeon: Alejandra Steen DO;  Location: [facility];   Service: Gastroenterology   • HYSTERECTOMY     • KNEE SURGERY Bilateral 1991       Family History:   Family History   Problem Relation Age of Onset   • Diabetes Father    • Bipolar disorder Mother        Social history:   Social History     Socioeconomic History   • Marital status:      Spouse name: Not on file   • Number of children: Not on file   • Years of education: Not on file   • Highest education level: Not on file   Occupational History   • Not on file   Tobacco Use   • Smoking status: Every Day     Packs/day: 0 50     Years: 25 00     Pack years: 12 50     Types: Cigarettes     Passive exposure: Current   • Smokeless tobacco: Never   • Tobacco comments:     smokes like 5 a day(06/24/2022)   Vaping Use   • Vaping Use: Never used   Substance and Sexual Activity   • Alcohol use: Not Currently     Alcohol/week: 6 0 standard drinks     Types: 6 Cans of beer per week     Comment: last drink on 08/15/20   • Drug use: Never   • Sexual activity: Not Currently     Partners: Male     Birth control/protection: None   Other Topics Concern   • Not on file   Social History Narrative   • Not on file     Social Determinants of Health     Financial Resource Strain: Not on file   Food Insecurity: Not on file   Transportation Needs: Not on file   Physical Activity: Not on file   Stress: Not on file   Social Connections: Not on file   Intimate Partner Violence: Not on file   Housing Stability: Not on file       Review of Systems: All other systems were reviewed and were negative, otherwise please refer to HPI    Physical Exam: /84 (BP Location: Right arm)   Pulse 99   Temp 98 °F (36 7 °C) (Oral)   Resp 18   Ht 5' 3\" (1 6 m)   Wt 77 7 kg (171 lb 6 4 oz)   SpO2 94%   BMI 30 36 kg/m²     General Appearance:    Alert, cooperative, no distress, appears stated age   Head:    Normocephalic, without obvious " abnormality, atraumatic   Eyes:    No scleral icterus           Mouth:  Mucosa moist   Neck:   Supple, symmetrical, trachea midline, no thyromegaly       Lungs:     Clear to auscultation bilaterally, respirations unlabored       Heart:    Regular rate and rhythm, S1 and S2 normal, no murmur, rub   or gallop     Abdomen:     Soft, non-tender, bowel sounds active all four quadrants,     no masses, no organomegaly   Genitalia:   deferred   Rectal:   deferred   Extremities:   Extremities normal,no cyanosis or edema       Skin:   Skin color, texture, turgor normal, no rashes or lesions       Neurologic:   Grossly intact, no focal deficit           Lab Results:   Recent Results (from the past 24 hour(s))   Fingerstick Glucose (POCT)    Collection Time: 03/20/23 11:38 AM   Result Value Ref Range    POC Glucose 132 65 - 140 mg/dl   Fingerstick Glucose (POCT)    Collection Time: 03/20/23  4:26 PM   Result Value Ref Range    POC Glucose 115 65 - 140 mg/dl   Fingerstick Glucose (POCT)    Collection Time: 03/21/23  7:14 AM   Result Value Ref Range    POC Glucose 114 65 - 140 mg/dl       Imaging Studies: XR abdomen 1 view kub    Result Date: 3/18/2023  Narrative: ABDOMEN INDICATION:   abd pain  COMPARISON:  CT abdomen pelvis exam dated one day prior and abdomen radiographs dated 1/25/2023 VIEWS:  AP supine FINDINGS: There is a nonspecific nonobstructive bowel gas pattern  No discernible free air on this supine study  Upright or left lateral decubitus imaging is more sensitive to detect subtle free air in the appropriate setting  No pathologic calcifications or soft tissue masses  Surgical clips seen in the right upper quadrant and pelvis Visualized lung bases are clear  Visualized osseous structures are unremarkable for the patient's age  Impression: Nonspecific nonobstructive bowel gas pattern   Workstation performed: [id number]     CT abdomen pelvis with contrast    Result Date: 3/17/2023  Narrative: CT ABDOMEN AND PELVIS WITH IV CONTRAST INDICATION:   generalized abdominal pain, nausea, constipation  COMPARISON:  Most recent prior CT scan is dated January 25, 2023  TECHNIQUE:  CT examination of the abdomen and pelvis was performed  Axial, sagittal, and coronal 2D reformatted images were created from the source data and submitted for interpretation  Radiation dose length product (DLP) for this visit:  524 mGy-cm   This examination, like all CT scans performed in the [facility], was performed utilizing techniques to minimize radiation dose exposure, including the use of iterative reconstruction and automated exposure control  IV Contrast:  100 mL of iohexol (OMNIPAQUE) Enteric Contrast:  Enteric contrast was not administered  FINDINGS: ABDOMEN LOWER CHEST:  Unremarkable  LIVER/BILIARY TREE:  Few low-attenuation lesions scattered throughout the liver which have been unchanged since at least 2000 compatible with a benign etiology  GALLBLADDER:  Gallbladder is surgically absent  SPLEEN:  Unremarkable  PANCREAS:  Unremarkable  ADRENAL GLANDS:  Unremarkable  KIDNEYS/URETERS:  Unremarkable  No hydronephrosis  STOMACH AND BOWEL:  Fluid-filled small bowel and colon with no bowel wall thickening or hyperemia of the bowel wall, findings which could be related to laxative use  Mild gastric wall thickening and mucosal hyperemia suggestive of gastritis  No adjacent inflammatory fat stranding  APPENDIX:  No findings to suggest appendicitis  ABDOMINOPELVIC CAVITY:  No ascites  No pneumoperitoneum  No lymphadenopathy  VESSELS:  Unremarkable for patient's age  PELVIS REPRODUCTIVE ORGANS:  Surgical changes of prior hysterectomy  URINARY BLADDER:  Unremarkable  ABDOMINAL WALL/INGUINAL REGIONS:  Unremarkable  OSSEOUS STRUCTURES:  No acute fracture or destructive osseous lesion  Impression: Findings concerning for gastritis  Recommend correlation with patient's symptoms   Fluid-filled colon and multiple small bowel loops but no wall thickening or hyperemia suggestive of laxative use  Workstation performed: [id number]       Assessment/Plan:   Abdominal pain-  CAT scan did show findings consistent with gastritis and patient did have EGD a few years ago which did reveal some mild gastritis but no evidence of H  Pylori  We will plan for upper endoscopy for further evaluation of abdominal pain  Rule out peptic ulcer disease, H  pylori infection  Patient also at risk of gastroparesis from her diabetes which can be contributing to her nausea and early satiety symptoms  Patient now is complaining of constipation but in prior history does have history of diarrhea with negative biopsies for microscopic colitis  Patient may have irritable bowel syndrome which is contributing to her abdominal pain but likely has underlying adhesive disease as well from extensive abdominal surgeries  Patient is reporting that she still has not had significant bowel movement and inability to tolerate p o  well    Plan originally was to do endoscopy and colonoscopy as an outpatient but we can plan for tomorrow and MiraLAX prep was ordered and enemas ordered in a m  if patient does not have adequate results with prep   -Continue PPI  -Continue antiemetics as needed  -We will make further recommendations once upper endoscopy and colonoscopy have been performed, thank you for the consultation, case will be discussed with Dr Darinel Hu

## 2023-03-21 NOTE — ASSESSMENT & PLAN NOTE
· Presents with abdominal pain, distension, N/V progressing over past week  Notes last BM 5 days prior  Not passing flatus  Not improved with home Miralax and prune juice  · With admission for same in January  Constipation appears to continue to be driving factor of symptoms  Now on daily Miralax without improvement  Consider addition of Senna once taking PO  · CT abd/pelvis: findings concerning for gastritis  Fluid-filled colon and multiple small bowel loops but no wall thickening or hyperemia suggestive of laxative use  · AFVSS  WBC 11 29  Tachycardia improving with IVF  Lactic 3 0  · Remove NG tube as patient abdominal pain has improved  Patient had a liquid bowel movement, but no solid BM  · Will start on clear liquid diet as patient has loose stools since the last night after enema    · GI saw patient and are tentatively planning for scope tomorrow

## 2023-03-21 NOTE — PLAN OF CARE
Problem: GASTROINTESTINAL - ADULT  Goal: Minimal or absence of nausea and/or vomiting  Description: INTERVENTIONS:  - Administer IV fluids if ordered to ensure adequate hydration  - Maintain NPO status until nausea and vomiting are resolved  - Nasogastric tube if ordered  - Administer ordered antiemetic medications as needed  - Provide nonpharmacologic comfort measures as appropriate  - Advance diet as tolerated, if ordered  - Consider nutrition services referral to assist patient with adequate nutrition and appropriate food choices  Outcome: Progressing     Problem: GASTROINTESTINAL - ADULT  Goal: Maintains or returns to baseline bowel function  Description: INTERVENTIONS:  - Assess bowel function  - Encourage oral fluids to ensure adequate hydration  - Administer IV fluids if ordered to ensure adequate hydration  - Administer ordered medications as needed  - Encourage mobilization and activity  - Consider nutritional services referral to assist patient with adequate nutrition and appropriate food choices  Outcome: Progressing     Problem: PAIN - ADULT  Goal: Verbalizes/displays adequate comfort level or baseline comfort level  Description: Interventions:  - Encourage patient to monitor pain and request assistance  - Assess pain using appropriate pain scale  - Administer analgesics based on type and severity of pain and evaluate response  - Implement non-pharmacological measures as appropriate and evaluate response  - Consider cultural and social influences on pain and pain management  - Notify physician/advanced practitioner if interventions unsuccessful or patient reports new pain  Outcome: Progressing

## 2023-03-21 NOTE — PROGRESS NOTES
[facility]  68   Progress Note Vignesh Bates 1965, 56 y o  female MRN: [id number]  Unit/Bed#: -01 Encounter: [id number]  Primary Care Provider: Dayanara Hook MD   Date and time admitted to hospital: 3/17/2023  8:37 PM    * Abdominal pain  Assessment & Plan  · Presents with abdominal pain, distension, N/V progressing over past week  Notes last BM 5 days prior  Not passing flatus  Not improved with home Miralax and prune juice  · With admission for same in January  Constipation appears to continue to be driving factor of symptoms  Now on daily Miralax without improvement  Consider addition of Senna once taking PO  · CT abd/pelvis: findings concerning for gastritis  Fluid-filled colon and multiple small bowel loops but no wall thickening or hyperemia suggestive of laxative use  · AFVSS  WBC 11 29  Tachycardia improving with IVF  Lactic 3 0  · Remove NG tube as patient abdominal pain has improved  Patient had a liquid bowel movement, but no solid BM  · Will start on clear liquid diet as patient has loose stools since the last night after enema  · GI saw patient and are tentatively planning for scope tomorrow    Cigarette smoker  Assessment & Plan  · NRT ordered    Diabetes mellitus type 2 in obese [facility])  Assessment & Plan  Lab Results   Component Value Date    HGBA1C 5 5 03/17/2023       Recent Labs     03/20/23  1626 03/21/23  0714 03/21/23  1125 03/21/23  1518   POCGLU 115 114 132 92       Blood Sugar Average: Last 72 hrs:  (P) 107 5   · Update A1c  · Hold metformin  · Continue lantus 25 units h s   · SSI and acu-checks    Elevated lactic acid level  Assessment & Plan  · Lactic 3 0   AG 14, suspect due to dehydration with ongoing vomiting and decreased PO intake  · Both have resolved with IVF  · Continue IVF hydration  · Trend lactic/BMP    Generalized anxiety disorder  Assessment & Plan  · Continue home medication regimen    Essential hypertension  Assessment & Plan  · Blood pressure controlled off of oral antihypertensives, which were held on admission due to NPO status  · Continue to monitor    VTE Pharmacologic Prophylaxis: VTE Score: 3 Moderate Risk (Score 3-4) - Pharmacological DVT Prophylaxis Ordered: heparin  Patient Centered Rounds: I performed bedside rounds with nursing staff today  Discussions with Specialists or Other Care Team Provider: N/A    Education and Discussions with Family / Patient: Patient declined call to   Total Time Spent on Date of Encounter in care of patient: 45 minutes This time was spent on one or more of the following: performing physical exam; counseling and coordination of care; obtaining or reviewing history; documenting in the medical record; reviewing/ordering tests, medications or procedures; communicating with other healthcare professionals and discussing with patient's family/caregivers  Current Length of Stay: 3 day(s)  Current Patient Status: Inpatient   Certification Statement: The patient will continue to require additional inpatient hospital stay due to Abdominal pain  Discharge Plan: Anticipate discharge in 24-48 hrs to home  Code Status: Level 1 - Full Code    Subjective:   Seen and examined at bedside patient is walking up and down the hallway and appears anxious  Complains of some abdominal distention and tells me she has not had a solid bowel movement in several days  She also states she has not eaten solid food in a while due to decreased appetite    Objective:     Vitals:   Temp (24hrs), Av 3 °F (36 8 °C), Min:98 °F (36 7 °C), Max:98 6 °F (37 °C)    Temp:  [98 °F (36 7 °C)-98 6 °F (37 °C)] 98 6 °F (37 °C)  HR:  [67-99] 67  Resp:  [13-18] 16  BP: (108-129)/(57-84) 129/70  SpO2:  [94 %-100 %] 100 %  Body mass index is 30 36 kg/m²  Input and Output Summary (last 24 hours):      Intake/Output Summary (Last 24 hours) at 3/21/2023 1605  Last data filed at 3/21/2023 0416  Gross per 24 hour   Intake 440 ml Output 1 ml   Net 439 ml       Physical Exam:   Physical Exam  Vitals reviewed  Constitutional:       General: She is not in acute distress  Appearance: She is not ill-appearing  HENT:      Head: Normocephalic  Mouth/Throat:      Mouth: Mucous membranes are moist    Eyes:      General: No scleral icterus  Extraocular Movements: Extraocular movements intact  Cardiovascular:      Rate and Rhythm: Normal rate and regular rhythm  Pulses: Normal pulses  Heart sounds: No murmur heard  No friction rub  No gallop  Pulmonary:      Effort: Pulmonary effort is normal  No respiratory distress  Breath sounds: No wheezing, rhonchi or rales  Abdominal:      General: Bowel sounds are normal  There is distension  Palpations: Abdomen is soft  Tenderness: There is abdominal tenderness  There is no guarding or rebound  Musculoskeletal:      Right lower leg: No edema  Left lower leg: No edema  Skin:     General: Skin is warm  Capillary Refill: Capillary refill takes less than 2 seconds  Coloration: Skin is not jaundiced  Findings: No rash  Neurological:      General: No focal deficit present  Mental Status: She is alert and oriented to person, place, and time  Sensory: No sensory deficit  Motor: No weakness  Psychiatric:         Mood and Affect: Mood normal          Behavior: Behavior normal           Additional Data:     Labs:  Results from last 7 days   Lab Units 03/20/23 0458 03/19/23  0544 03/18/23  1946   WBC Thousand/uL 10 18*   < > 11 25*   HEMOGLOBIN g/dL 11 8   < > 12 7   HEMATOCRIT % 35 4   < > 37 7   PLATELETS Thousands/uL 381   < > 357   NEUTROS PCT %  --   --  69   LYMPHS PCT %  --   --  21   MONOS PCT %  --   --  9   EOS PCT %  --   --  1    < > = values in this interval not displayed       Results from last 7 days   Lab Units 03/20/23 0458 03/19/23  0544   SODIUM mmol/L 139 142   POTASSIUM mmol/L 3 3* 3 6   CHLORIDE mmol/L 101 104   CO2 mmol/L 28 25   BUN mg/dL 4* 3*   CREATININE mg/dL 0 46* 0 52*   ANION GAP mmol/L 10 13   CALCIUM mg/dL 9 2 9 3   ALBUMIN g/dL  --  3 9   TOTAL BILIRUBIN mg/dL  --  0 99   ALK PHOS U/L  --  150*   ALT U/L  --  97*   AST U/L  --  44*   GLUCOSE RANDOM mg/dL 90 139         Results from last 7 days   Lab Units 03/21/23  1518 03/21/23  1125 03/21/23  0714 03/20/23  1626 03/20/23  1138 03/20/23  0022 03/19/23  1945 03/19/23  1710 03/19/23  1141 03/19/23  0833 03/19/23  0207 03/18/23  2120   POC GLUCOSE mg/dl 92 132 114 115 132 93 111 102 115 99 96 112     Results from last 7 days   Lab Units 03/17/23 2123   HEMOGLOBIN A1C % 5 5     Results from last 7 days   Lab Units 03/18/23  1946 03/18/23  0201 03/17/23 2123   LACTIC ACID mmol/L 0 9 1 5 3 0*       Lines/Drains:  Invasive Devices     Peripheral Intravenous Line  Duration           Peripheral IV 03/21/23 Dorsal (posterior); Left Forearm <1 day                Imaging: Reviewed radiology reports from this admission including: abdominal/pelvic CT    Recent Cultures (last 7 days):         Last 24 Hours Medication List:   Current Facility-Administered Medications   Medication Dose Route Frequency Provider Last Rate   • ARIPiprazole  10 mg Oral Daily Maris Lu MD     • bisacodyl  20 mg Oral Once Shannon Orellana PA-C     • clonazePAM  0 5 mg Oral BID PRN Chris Jones MD     • docusate sodium  100 mg Oral BID Chris Jones MD     • escitalopram  10 mg Oral Daily Chris Jones MD     • gabapentin  800 mg Oral TID Chris Jones MD     • heparin (porcine)  5,000 Units Subcutaneous Q8H [facility] Sarah Mathew PA-C     • HYDROmorphone  1 mg Intravenous Q4H PRN Chris Jones MD     • insulin glargine  25 Units Subcutaneous HS Taniya Goldstein PA-C     • insulin lispro  1-5 Units Subcutaneous TID AC Taniya Goldstein PA-C     • insulin lispro  1-5 Units Subcutaneous HS Magdaleno Figueroa PA-C     • levothyroxine  25 mcg Oral Early Morning Barbara Wolff MD     • lidocaine  1 patch Topical Daily Maris Rodriguez MD     • LORazepam  1 mg Intravenous Q4H PRN Barbara Wolff MD     • metoclopramide  10 mg Intravenous Q6H PRN Barbara Wolff MD     • nicotine  1 patch Transdermal Daily Sarah Napier PA-C     • pantoprazole  40 mg Intravenous Q12H [facility] Sarah Napier PA-C     • polyethylene glycol  238 g Oral Once Shannon Orellana PA-C     • polyethylene glycol  17 g Oral Daily Maris Rodriguez MD     • senna  2 tablet Oral HS Maris Rodriguez MD     • sodium chloride  1,000 mL Intravenous Once Kg Jefferson MD Stopped (03/18/23 5839)   • traZODone  200 mg Oral HS Barbara Wolff MD          Today, Patient Was Seen By: Sam Murphy    **Please Note: This note may have been constructed using a voice recognition system  **

## 2023-03-21 NOTE — ASSESSMENT & PLAN NOTE
· Blood pressure controlled off of oral antihypertensives, which were held on admission due to NPO status  · Continue to monitor

## 2023-03-21 NOTE — ASSESSMENT & PLAN NOTE
· Lactic 3 0   AG 14, suspect due to dehydration with ongoing vomiting and decreased PO intake  · Both have resolved with IVF  · Continue IVF hydration  · Trend lactic/BMP

## 2023-03-21 NOTE — ASSESSMENT & PLAN NOTE
Lab Results   Component Value Date    HGBA1C 5 5 03/17/2023       Recent Labs     03/20/23  1626 03/21/23  0714 03/21/23  1125 03/21/23  1518   POCGLU 115 114 132 92       Blood Sugar Average: Last 72 hrs:  (P) 107 5   · Update A1c  · Hold metformin  · Continue lantus 25 units h s   · SSI and acu-checks

## 2023-03-22 ENCOUNTER — ANESTHESIA (INPATIENT)
Dept: GASTROENTEROLOGY | Facility: HOSPITAL | Age: 58
End: 2023-03-22

## 2023-03-22 ENCOUNTER — APPOINTMENT (INPATIENT)
Dept: GASTROENTEROLOGY | Facility: HOSPITAL | Age: 58
End: 2023-03-22

## 2023-03-22 ENCOUNTER — ANESTHESIA EVENT (INPATIENT)
Dept: GASTROENTEROLOGY | Facility: HOSPITAL | Age: 58
End: 2023-03-22

## 2023-03-22 LAB
ANION GAP SERPL CALCULATED.3IONS-SCNC: 8 MMOL/L (ref 4–13)
ATRIAL RATE: 92 BPM
BUN SERPL-MCNC: 2 MG/DL (ref 5–25)
CALCIUM SERPL-MCNC: 9 MG/DL (ref 8.4–10.2)
CHLORIDE SERPL-SCNC: 102 MMOL/L (ref 96–108)
CO2 SERPL-SCNC: 29 MMOL/L (ref 21–32)
CREAT SERPL-MCNC: 0.51 MG/DL (ref 0.6–1.3)
GFR SERPL CREATININE-BSD FRML MDRD: 108 ML/MIN/1.73SQ M
GLUCOSE SERPL-MCNC: 117 MG/DL (ref 65–140)
GLUCOSE SERPL-MCNC: 117 MG/DL (ref 65–140)
GLUCOSE SERPL-MCNC: 129 MG/DL (ref 65–140)
GLUCOSE SERPL-MCNC: 170 MG/DL (ref 65–140)
GLUCOSE SERPL-MCNC: 99 MG/DL (ref 65–140)
MAGNESIUM SERPL-MCNC: 2 MG/DL (ref 1.9–2.7)
P AXIS: 42 DEGREES
POTASSIUM SERPL-SCNC: 3.4 MMOL/L (ref 3.5–5.3)
PR INTERVAL: 264 MS
QRS AXIS: 5 DEGREES
QRSD INTERVAL: 74 MS
QT INTERVAL: 366 MS
QTC INTERVAL: 452 MS
SODIUM SERPL-SCNC: 139 MMOL/L (ref 135–147)
T WAVE AXIS: 18 DEGREES
VENTRICULAR RATE: 92 BPM

## 2023-03-22 PROCEDURE — 0DB78ZX EXCISION OF STOMACH, PYLORUS, VIA NATURAL OR ARTIFICIAL OPENING ENDOSCOPIC, DIAGNOSTIC: ICD-10-PCS | Performed by: INTERNAL MEDICINE

## 2023-03-22 PROCEDURE — 0DBF8ZX EXCISION OF RIGHT LARGE INTESTINE, VIA NATURAL OR ARTIFICIAL OPENING ENDOSCOPIC, DIAGNOSTIC: ICD-10-PCS | Performed by: INTERNAL MEDICINE

## 2023-03-22 PROCEDURE — 0DBK8ZX EXCISION OF ASCENDING COLON, VIA NATURAL OR ARTIFICIAL OPENING ENDOSCOPIC, DIAGNOSTIC: ICD-10-PCS | Performed by: INTERNAL MEDICINE

## 2023-03-22 RX ORDER — SODIUM CHLORIDE, SODIUM LACTATE, POTASSIUM CHLORIDE, CALCIUM CHLORIDE 600; 310; 30; 20 MG/100ML; MG/100ML; MG/100ML; MG/100ML
INJECTION, SOLUTION INTRAVENOUS CONTINUOUS PRN
Status: DISCONTINUED | OUTPATIENT
Start: 2023-03-22 | End: 2023-03-22

## 2023-03-22 RX ORDER — PROPOFOL 10 MG/ML
INJECTION, EMULSION INTRAVENOUS AS NEEDED
Status: DISCONTINUED | OUTPATIENT
Start: 2023-03-22 | End: 2023-03-22

## 2023-03-22 RX ORDER — SODIUM CHLORIDE, SODIUM GLUCONATE, SODIUM ACETATE, POTASSIUM CHLORIDE, MAGNESIUM CHLORIDE, SODIUM PHOSPHATE, DIBASIC, AND POTASSIUM PHOSPHATE .53; .5; .37; .037; .03; .012; .00082 G/100ML; G/100ML; G/100ML; G/100ML; G/100ML; G/100ML; G/100ML
100 INJECTION, SOLUTION INTRAVENOUS CONTINUOUS
Status: DISCONTINUED | OUTPATIENT
Start: 2023-03-22 | End: 2023-03-22

## 2023-03-22 RX ORDER — OXYCODONE HYDROCHLORIDE 5 MG/1
5 TABLET ORAL EVERY 6 HOURS PRN
Status: DISCONTINUED | OUTPATIENT
Start: 2023-03-22 | End: 2023-03-23 | Stop reason: HOSPADM

## 2023-03-22 RX ORDER — POTASSIUM CHLORIDE 20MEQ/15ML
20 LIQUID (ML) ORAL ONCE
Status: COMPLETED | OUTPATIENT
Start: 2023-03-22 | End: 2023-03-22

## 2023-03-22 RX ORDER — HYDROMORPHONE HCL/PF 1 MG/ML
0.5 SYRINGE (ML) INJECTION EVERY 6 HOURS PRN
Status: DISCONTINUED | OUTPATIENT
Start: 2023-03-22 | End: 2023-03-23 | Stop reason: HOSPADM

## 2023-03-22 RX ORDER — LIDOCAINE HYDROCHLORIDE 20 MG/ML
INJECTION, SOLUTION EPIDURAL; INFILTRATION; INTRACAUDAL; PERINEURAL AS NEEDED
Status: DISCONTINUED | OUTPATIENT
Start: 2023-03-22 | End: 2023-03-22

## 2023-03-22 RX ADMIN — GABAPENTIN 800 MG: 400 CAPSULE ORAL at 21:08

## 2023-03-22 RX ADMIN — ESCITALOPRAM OXALATE 10 MG: 10 TABLET ORAL at 08:40

## 2023-03-22 RX ADMIN — HEPARIN SODIUM 5000 UNITS: 5000 INJECTION INTRAVENOUS; SUBCUTANEOUS at 19:33

## 2023-03-22 RX ADMIN — HYDROMORPHONE HYDROCHLORIDE 1 MG: 1 INJECTION, SOLUTION INTRAMUSCULAR; INTRAVENOUS; SUBCUTANEOUS at 16:21

## 2023-03-22 RX ADMIN — SODIUM CHLORIDE, SODIUM LACTATE, POTASSIUM CHLORIDE, AND CALCIUM CHLORIDE: .6; .31; .03; .02 INJECTION, SOLUTION INTRAVENOUS at 13:46

## 2023-03-22 RX ADMIN — HYDROMORPHONE HYDROCHLORIDE 1 MG: 1 INJECTION, SOLUTION INTRAMUSCULAR; INTRAVENOUS; SUBCUTANEOUS at 05:09

## 2023-03-22 RX ADMIN — HYDROMORPHONE HYDROCHLORIDE 1 MG: 1 INJECTION, SOLUTION INTRAMUSCULAR; INTRAVENOUS; SUBCUTANEOUS at 09:59

## 2023-03-22 RX ADMIN — PROPOFOL 30 MG: 10 INJECTION, EMULSION INTRAVENOUS at 14:20

## 2023-03-22 RX ADMIN — PROPOFOL 30 MG: 10 INJECTION, EMULSION INTRAVENOUS at 14:34

## 2023-03-22 RX ADMIN — PANTOPRAZOLE SODIUM 40 MG: 40 INJECTION, POWDER, FOR SOLUTION INTRAVENOUS at 21:09

## 2023-03-22 RX ADMIN — GABAPENTIN 800 MG: 400 CAPSULE ORAL at 16:21

## 2023-03-22 RX ADMIN — PROPOFOL 30 MG: 10 INJECTION, EMULSION INTRAVENOUS at 14:26

## 2023-03-22 RX ADMIN — GABAPENTIN 800 MG: 400 CAPSULE ORAL at 08:40

## 2023-03-22 RX ADMIN — DOCUSATE SODIUM 100 MG: 100 CAPSULE, LIQUID FILLED ORAL at 17:21

## 2023-03-22 RX ADMIN — PROPOFOL 30 MG: 10 INJECTION, EMULSION INTRAVENOUS at 14:30

## 2023-03-22 RX ADMIN — LIDOCAINE 5% 1 PATCH: 700 PATCH TOPICAL at 08:40

## 2023-03-22 RX ADMIN — INSULIN GLARGINE 25 UNITS: 100 INJECTION, SOLUTION SUBCUTANEOUS at 21:09

## 2023-03-22 RX ADMIN — HYDROMORPHONE HYDROCHLORIDE 0.5 MG: 1 INJECTION, SOLUTION INTRAMUSCULAR; INTRAVENOUS; SUBCUTANEOUS at 22:51

## 2023-03-22 RX ADMIN — POLYETHYLENE GLYCOL 3350 17 G: 17 POWDER, FOR SOLUTION ORAL at 08:40

## 2023-03-22 RX ADMIN — PROPOFOL 30 MG: 10 INJECTION, EMULSION INTRAVENOUS at 14:23

## 2023-03-22 RX ADMIN — NICOTINE 1 PATCH: 14 PATCH, EXTENDED RELEASE TRANSDERMAL at 08:40

## 2023-03-22 RX ADMIN — DOCUSATE SODIUM 100 MG: 100 CAPSULE, LIQUID FILLED ORAL at 08:40

## 2023-03-22 RX ADMIN — SODIUM CHLORIDE, SODIUM GLUCONATE, SODIUM ACETATE, POTASSIUM CHLORIDE, MAGNESIUM CHLORIDE, SODIUM PHOSPHATE, DIBASIC, AND POTASSIUM PHOSPHATE 100 ML/HR: .53; .5; .37; .037; .03; .012; .00082 INJECTION, SOLUTION INTRAVENOUS at 09:15

## 2023-03-22 RX ADMIN — LIDOCAINE HYDROCHLORIDE 80 MG: 20 INJECTION, SOLUTION EPIDURAL; INFILTRATION; INTRACAUDAL; PERINEURAL at 14:16

## 2023-03-22 RX ADMIN — POTASSIUM CHLORIDE 20 MEQ: 1.5 SOLUTION ORAL at 08:40

## 2023-03-22 RX ADMIN — TRAZODONE HYDROCHLORIDE 200 MG: 100 TABLET ORAL at 21:08

## 2023-03-22 RX ADMIN — INSULIN LISPRO 1 UNITS: 100 INJECTION, SOLUTION INTRAVENOUS; SUBCUTANEOUS at 21:09

## 2023-03-22 RX ADMIN — LEVOTHYROXINE SODIUM 25 MCG: 25 TABLET ORAL at 05:09

## 2023-03-22 RX ADMIN — SENNOSIDES 17.2 MG: 8.6 TABLET, FILM COATED ORAL at 21:08

## 2023-03-22 RX ADMIN — PROPOFOL 150 MG: 10 INJECTION, EMULSION INTRAVENOUS at 14:17

## 2023-03-22 RX ADMIN — OXYCODONE HYDROCHLORIDE 5 MG: 5 TABLET ORAL at 21:10

## 2023-03-22 RX ADMIN — HEPARIN SODIUM 5000 UNITS: 5000 INJECTION INTRAVENOUS; SUBCUTANEOUS at 10:02

## 2023-03-22 RX ADMIN — METOCLOPRAMIDE HYDROCHLORIDE 10 MG: 5 INJECTION INTRAMUSCULAR; INTRAVENOUS at 05:15

## 2023-03-22 RX ADMIN — ARIPIPRAZOLE 10 MG: 5 TABLET ORAL at 08:40

## 2023-03-22 RX ADMIN — PANTOPRAZOLE SODIUM 40 MG: 40 INJECTION, POWDER, FOR SOLUTION INTRAVENOUS at 08:41

## 2023-03-22 NOTE — ASSESSMENT & PLAN NOTE
· Presents with abdominal pain, distension, N/V progressing over past week  Notes last BM 5 days prior  Not passing flatus  Not improved with home Miralax and prune juice  · With admission for same in January  Constipation appears to continue to be driving factor of symptoms  Now on daily Miralax without improvement  Consider addition of Senna once taking PO  · CT abd/pelvis: findings concerning for gastritis  Fluid-filled colon and multiple small bowel loops but no wall thickening or hyperemia suggestive of laxative use  · AFVSS  WBC 11 29  Tachycardia improving with IVF  Lactic 3 0  · Remove NG tube as patient abdominal pain has improved  Patient had a liquid bowel movement, but no solid BM  · Will start on clear liquid diet as patient has loose stools since the last night after enema    · GI saw patient and she is status post EGD and colonoscopy, will follow up with GI

## 2023-03-22 NOTE — ANESTHESIA POSTPROCEDURE EVALUATION
Post-Op Assessment Note    CV Status:  Stable    Pain management: adequate     Mental Status:  Arousable   Hydration Status:  Euvolemic   PONV Controlled:  Controlled   Airway Patency:  Patent      Post Op Vitals Reviewed: Yes      Staff: CRNA         No notable events documented      BP   103/55   Temp      Pulse  78   Resp   14   SpO2   98

## 2023-03-22 NOTE — ANESTHESIA PREPROCEDURE EVALUATION
Procedure:  EGD  COLONOSCOPY    Relevant Problems   CARDIO   (+) Essential hypertension   (+) Hyperlipemia   (+) Hypertensive urgency   (+) Migraine without aura and without status migrainosus, not intractable   (+) Primary hypertension      ENDO   (+) Acquired hypothyroidism   (+) Diabetes mellitus type 2 in obese (HCC)   (+) Hypothyroidism      GI/HEPATIC   (+) Gastroesophageal reflux disease without esophagitis      NEURO/PSYCH   (+) Bipolar disorder current episode depressed (HCC)   (+) Generalized anxiety disorder   (+) Migraine without aura and without status migrainosus, not intractable   (+) Post-traumatic stress disorder, chronic      PULMONARY   (+) Cigarette smoker   (+) Pulmonary emphysema, unspecified emphysema type (HCC)        Physical Exam    Airway    Mallampati score: II  TM Distance: >3 FB  Neck ROM: full     Dental       Cardiovascular      Pulmonary      Other Findings  Poor dentition      Anesthesia Plan  ASA Score- 3     Anesthesia Type- IV sedation with anesthesia with ASA Monitors  Additional Monitors:   Airway Plan:     Comment: Denies nausea or vomiting all day  Plan Factors-Exercise tolerance (METS): >4 METS  Chart reviewed  Patient is not a current smoker  Patient did not smoke on day of surgery  Obstructive sleep apnea risk education given perioperatively  Induction- intravenous  Postoperative Plan-     Informed Consent- Anesthetic plan and risks discussed with patient  I personally reviewed this patient with the CRNA  Discussed and agreed on the Anesthesia Plan with the CRNA           NPO appropriate  Discussed benefits/risks of monitored anesthetic care and discussed providing a dynamic level of mild to deep sedation  Risks include awareness, airway obstruction, aspiration which may necessitate conversion to general anesthesia  All questions answered  Patient understands and wishes to proceed      Anesthesia plan and consent discussed with Rach Magdaleno who expressed understanding and agreement  Risks/benefits and alternatives discussed with patient including possible PONV, sore throat, damage to teeth/lips/gums and possibility of rare anesthetic and surgical emergencies

## 2023-03-22 NOTE — PLAN OF CARE
Problem: GASTROINTESTINAL - ADULT  Goal: Minimal or absence of nausea and/or vomiting  Description: INTERVENTIONS:  - Administer IV fluids if ordered to ensure adequate hydration  - Administer ordered antiemetic medications as needed  - Provide nonpharmacologic comfort measures as appropriate  - Advance diet as tolerated, if ordered  - Consider nutrition services referral to assist patient with adequate nutrition and appropriate food choices  Outcome: Progressing  Goal: Maintains or returns to baseline bowel function  Description: INTERVENTIONS:  - Assess bowel function  - Encourage oral fluids to ensure adequate hydration  - Administer IV fluids if ordered to ensure adequate hydration  - Administer ordered medications as needed  - Encourage mobilization and activity  - Consider nutritional services referral to assist patient with adequate nutrition and appropriate food choices  Outcome: Progressing  Goal: Maintains adequate nutritional intake  Description: INTERVENTIONS:  - Monitor percentage of each meal consumed  - Identify factors contributing to decreased intake, treat as appropriate  - Assist with meals as needed  - Monitor I&O, weight, and lab values if indicated  - Obtain nutrition services referral as needed  Outcome: Progressing     Problem: PAIN - ADULT  Goal: Verbalizes/displays adequate comfort level or baseline comfort level  Description: Interventions:  - Encourage patient to monitor pain and request assistance  - Assess pain using appropriate pain scale  - Administer analgesics based on type and severity of pain and evaluate response  - Implement non-pharmacological measures as appropriate and evaluate response  - Consider cultural and social influences on pain and pain management  - Notify physician/advanced practitioner if interventions unsuccessful or patient reports new pain  Outcome: Progressing     Problem: INFECTION - ADULT  Goal: Absence or prevention of progression during hospitalization  Description: INTERVENTIONS:  - Assess and monitor for signs and symptoms of infection  - Monitor lab/diagnostic results  - Administer medications as ordered  - Instruct and encourage patient and family to use good hand hygiene technique  Outcome: Progressing  Goal: Absence of fever/infection during neutropenic period  Description: INTERVENTIONS:  - Monitor WBC    Outcome: Progressing     Problem: SAFETY ADULT  Goal: Maintain or return to baseline ADL function  Description: INTERVENTIONS:  -  Assess patient's ability to carry out ADLs; assess patient's baseline for ADL function and identify physical deficits which impact ability to perform ADLs (bathing, care of mouth/teeth, toileting, grooming, dressing, etc )  - Assess/evaluate cause of self-care deficits   - Assess range of motion  - Assess patient's mobility; develop plan if impaired  - Assess patient's need for assistive devices and provide as appropriate  - Encourage maximum independence but intervene and supervise when necessary  - Involve family in performance of ADLs  - Assess for home care needs following discharge   - Consider OT consult to assist with ADL evaluation and planning for discharge  - Provide patient education as appropriate  Outcome: Progressing  Goal: Maintains/Returns to pre admission functional level  Description: INTERVENTIONS:  - Perform BMAT or MOVE assessment daily    - Set and communicate daily mobility goal to care team and patient/family/caregiver  - Collaborate with rehabilitation services on mobility goals if consulted  - Perform Range of Motion 3  times a day    - Dangle patient 3  times a day  - Stand patient  3  times a day  - Ambulate patient 3 times a day  - Out of bed to chair 3  times a day   - Out of bed for meals  3  times a day  - Out of bed for toileting  - Record patient progress and toleration of activity level   Outcome: Progressing     Problem: DISCHARGE PLANNING  Goal: Discharge to home or other facility with appropriate resources  Description: INTERVENTIONS:  - Identify barriers to discharge w/patient and caregiver  - Arrange for needed discharge resources and transportation as appropriate  - Identify discharge learning needs (meds, wound care, etc )  - Arrange for interpretive services to assist at discharge as needed  - Refer to Case Management Department for coordinating discharge planning if the patient needs post-hospital services based on physician/advanced practitioner order or complex needs related to functional status, cognitive ability, or social support system  Outcome: Progressing     Problem: Knowledge Deficit  Goal: Patient/family/caregiver demonstrates understanding of disease process, treatment plan, medications, and discharge instructions  Description: Complete learning assessment and assess knowledge base  Interventions:  - Provide teaching at level of understanding  - Provide teaching via preferred learning methods  Outcome: Progressing     Problem: Nutrition/Hydration-ADULT  Goal: Nutrient/Hydration intake appropriate for improving, restoring or maintaining nutritional needs  Description: Monitor and assess patient's nutrition/hydration status for malnutrition  Collaborate with interdisciplinary team and initiate plan and interventions as ordered  Monitor patient's weight and dietary intake as ordered or per policy  Utilize nutrition screening tool and intervene as necessary  Determine patient's food preferences and provide high-protein, high-caloric foods as appropriate       INTERVENTIONS:  - Monitor oral intake, urinary output, labs, and treatment plans  - Assess nutrition and hydration status and recommend course of action  - Evaluate amount of meals eaten  - Assist patient with eating if necessary   - Allow adequate time for meals  - Recommend/ encourage appropriate diets, oral nutritional supplements, and vitamin/mineral supplements  - Order, calculate, and assess calorie counts as needed  - Assess need for intravenous fluids  - Provide specific nutrition/hydration education as appropriate  - Include patient/family/caregiver in decisions related to nutrition  Outcome: Progressing     Problem: MOBILITY - ADULT  Goal: Maintain or return to baseline ADL function  Description: INTERVENTIONS:  -  Assess patient's ability to carry out ADLs; assess patient's baseline for ADL function and identify physical deficits which impact ability to perform ADLs (bathing, care of mouth/teeth, toileting, grooming, dressing, etc )  - Assess/evaluate cause of self-care deficits   - Assess range of motion  - Assess patient's mobility; develop plan if impaired  - Assess patient's need for assistive devices and provide as appropriate  - Encourage maximum independence but intervene and supervise when necessary  - Involve family in performance of ADLs  - Assess for home care needs following discharge   - Consider OT consult to assist with ADL evaluation and planning for discharge  - Provide patient education as appropriate  Outcome: Progressing  Goal: Maintains/Returns to pre admission functional level  Description: INTERVENTIONS:  - Perform BMAT or MOVE assessment daily    - Set and communicate daily mobility goal to care team and patient/family/caregiver  - Collaborate with rehabilitation services on mobility goals if consulted  - Perform Range of Motion 3  times a day    - Dangle patient 3  times a day  - Stand patient  3  times a day  - Ambulate patient  3  times a day  - Out of bed to chair  3 times a day   - Out of bed for meals  3  times a day  - Out of bed for toileting  - Record patient progress and toleration of activity level   Outcome: Progressing

## 2023-03-22 NOTE — ASSESSMENT & PLAN NOTE
Lab Results   Component Value Date    HGBA1C 5 5 03/17/2023       Recent Labs     03/21/23  2114 03/22/23  0714 03/22/23  1132 03/22/23  1606   POCGLU 184* 117 117 129       Blood Sugar Average: Last 72 hrs:  (P) 333 [id number]   · Update A1c  · Hold metformin  · Continue lantus 25 units h s   · SSI and acu-checks

## 2023-03-23 VITALS
OXYGEN SATURATION: 98 % | TEMPERATURE: 98.2 F | BODY MASS INDEX: 30.37 KG/M2 | HEART RATE: 75 BPM | WEIGHT: 171.4 LBS | RESPIRATION RATE: 20 BRPM | DIASTOLIC BLOOD PRESSURE: 85 MMHG | SYSTOLIC BLOOD PRESSURE: 127 MMHG | HEIGHT: 63 IN

## 2023-03-23 LAB
ANION GAP SERPL CALCULATED.3IONS-SCNC: 10 MMOL/L (ref 4–13)
BUN SERPL-MCNC: 2 MG/DL (ref 5–25)
CALCIUM SERPL-MCNC: 9.1 MG/DL (ref 8.4–10.2)
CHLORIDE SERPL-SCNC: 102 MMOL/L (ref 96–108)
CO2 SERPL-SCNC: 27 MMOL/L (ref 21–32)
CREAT SERPL-MCNC: 0.52 MG/DL (ref 0.6–1.3)
GFR SERPL CREATININE-BSD FRML MDRD: 107 ML/MIN/1.73SQ M
GLUCOSE SERPL-MCNC: 100 MG/DL (ref 65–140)
GLUCOSE SERPL-MCNC: 134 MG/DL (ref 65–140)
GLUCOSE SERPL-MCNC: 89 MG/DL (ref 65–140)
MAGNESIUM SERPL-MCNC: 2 MG/DL (ref 1.9–2.7)
POTASSIUM SERPL-SCNC: 3.3 MMOL/L (ref 3.5–5.3)
QRS AXIS: 18 DEGREES
QRS AXIS: 19 DEGREES
QRSD INTERVAL: 66 MS
QRSD INTERVAL: 66 MS
QT INTERVAL: 306 MS
QT INTERVAL: 308 MS
QTC INTERVAL: 485 MS
QTC INTERVAL: 493 MS
SODIUM SERPL-SCNC: 139 MMOL/L (ref 135–147)
T WAVE AXIS: 46 DEGREES
T WAVE AXIS: 47 DEGREES
VENTRICULAR RATE: 151 BPM
VENTRICULAR RATE: 154 BPM

## 2023-03-23 RX ORDER — POTASSIUM CHLORIDE 20MEQ/15ML
40 LIQUID (ML) ORAL ONCE
Status: COMPLETED | OUTPATIENT
Start: 2023-03-23 | End: 2023-03-23

## 2023-03-23 RX ORDER — DOCUSATE SODIUM 100 MG/1
100 CAPSULE, LIQUID FILLED ORAL 2 TIMES DAILY
Qty: 28 CAPSULE | Refills: 0 | Status: SHIPPED | OUTPATIENT
Start: 2023-03-23 | End: 2023-04-06

## 2023-03-23 RX ORDER — PANTOPRAZOLE SODIUM 40 MG/1
40 TABLET, DELAYED RELEASE ORAL 2 TIMES DAILY
Qty: 60 TABLET | Refills: 0 | Status: SHIPPED | OUTPATIENT
Start: 2023-03-23 | End: 2023-04-22

## 2023-03-23 RX ORDER — OXYCODONE HYDROCHLORIDE 5 MG/1
5 TABLET ORAL EVERY 6 HOURS PRN
Qty: 12 TABLET | Refills: 0 | Status: SHIPPED | OUTPATIENT
Start: 2023-03-23 | End: 2023-03-27

## 2023-03-23 RX ORDER — DOCUSATE SODIUM 100 MG/1
100 CAPSULE, LIQUID FILLED ORAL 2 TIMES DAILY
Qty: 60 CAPSULE | Refills: 0 | Status: SHIPPED | OUTPATIENT
Start: 2023-03-23

## 2023-03-23 RX ORDER — POLYETHYLENE GLYCOL 3350 17 G/17G
17 POWDER, FOR SOLUTION ORAL DAILY
Qty: 238 G | Refills: 0 | Status: SHIPPED | OUTPATIENT
Start: 2023-03-23 | End: 2023-04-06

## 2023-03-23 RX ADMIN — ESCITALOPRAM OXALATE 10 MG: 10 TABLET ORAL at 08:28

## 2023-03-23 RX ADMIN — OXYCODONE HYDROCHLORIDE 5 MG: 5 TABLET ORAL at 08:57

## 2023-03-23 RX ADMIN — HEPARIN SODIUM 5000 UNITS: 5000 INJECTION INTRAVENOUS; SUBCUTANEOUS at 10:27

## 2023-03-23 RX ADMIN — POTASSIUM CHLORIDE 40 MEQ: 1.5 SOLUTION ORAL at 10:27

## 2023-03-23 RX ADMIN — ARIPIPRAZOLE 10 MG: 5 TABLET ORAL at 08:28

## 2023-03-23 RX ADMIN — LEVOTHYROXINE SODIUM 25 MCG: 25 TABLET ORAL at 05:00

## 2023-03-23 RX ADMIN — HEPARIN SODIUM 5000 UNITS: 5000 INJECTION INTRAVENOUS; SUBCUTANEOUS at 03:37

## 2023-03-23 RX ADMIN — NICOTINE 1 PATCH: 14 PATCH, EXTENDED RELEASE TRANSDERMAL at 08:28

## 2023-03-23 RX ADMIN — POLYETHYLENE GLYCOL 3350 17 G: 17 POWDER, FOR SOLUTION ORAL at 08:27

## 2023-03-23 RX ADMIN — LIDOCAINE 5% 1 PATCH: 700 PATCH TOPICAL at 08:28

## 2023-03-23 RX ADMIN — HYDROMORPHONE HYDROCHLORIDE 0.5 MG: 1 INJECTION, SOLUTION INTRAMUSCULAR; INTRAVENOUS; SUBCUTANEOUS at 04:58

## 2023-03-23 RX ADMIN — PANTOPRAZOLE SODIUM 40 MG: 40 INJECTION, POWDER, FOR SOLUTION INTRAVENOUS at 08:28

## 2023-03-23 RX ADMIN — DOCUSATE SODIUM 100 MG: 100 CAPSULE, LIQUID FILLED ORAL at 08:28

## 2023-03-23 RX ADMIN — GABAPENTIN 800 MG: 400 CAPSULE ORAL at 08:28

## 2023-03-23 NOTE — NURSING NOTE
Patient discharged to home  All instructions reviewed with patient, all questions answered  IV line removed, catheter intact, no bleeding at site  Patient escorted to [facility] wheelchair

## 2023-03-23 NOTE — PLAN OF CARE
Problem: GASTROINTESTINAL - ADULT  Goal: Minimal or absence of nausea and/or vomiting  Description: INTERVENTIONS:  - Administer IV fluids if ordered to ensure adequate hydration  - Maintain NPO status until nausea and vomiting are resolved  - Nasogastric tube if ordered  - Administer ordered antiemetic medications as needed  - Provide nonpharmacologic comfort measures as appropriate  - Advance diet as tolerated, if ordered  - Consider nutrition services referral to assist patient with adequate nutrition and appropriate food choices  Outcome: Progressing  Goal: Maintains adequate nutritional intake  Description: INTERVENTIONS:  - Monitor percentage of each meal consumed  - Identify factors contributing to decreased intake, treat as appropriate  - Assist with meals as needed  - Monitor I&O, weight, and lab values if indicated  - Obtain nutrition services referral as needed  Outcome: Progressing     Problem: INFECTION - ADULT  Goal: Absence or prevention of progression during hospitalization  Description: INTERVENTIONS:  - Assess and monitor for signs and symptoms of infection  - Monitor lab/diagnostic results  - Monitor all insertion sites, i e  indwelling lines, tubes, and drains  - Monitor endotracheal if appropriate and nasal secretions for changes in amount and color  - [place] appropriate cooling/warming therapies per order  - Administer medications as ordered  - Instruct and encourage patient and family to use good hand hygiene technique  - Identify and instruct in appropriate isolation precautions for identified infection/condition  Outcome: Progressing  Goal: Absence of fever/infection during neutropenic period  Description: INTERVENTIONS:  - Monitor WBC    Outcome: Progressing     Problem: SAFETY ADULT  Goal: Patient will remain free of falls  Description: INTERVENTIONS:  - Educate patient/family on patient safety including physical limitations  - Instruct patient to call for assistance with activity   - Consult OT/PT to assist with strengthening/mobility   - Keep Call bell within reach  - Keep bed low and locked with side rails adjusted as appropriate  - Keep care items and personal belongings within reach  - Initiate and maintain comfort rounds  - Make Fall Risk Sign visible to staff  - Offer Toileting every prn Hours, in advance of need  - Initiate/Maintain  n/a alarm  - Obtain necessary fall risk management equipment: n/a  - Apply yellow socks and bracelet for high fall risk patients  - Consider moving patient to room near nurses station  Outcome: Progressing  Goal: Maintain or return to baseline ADL function  Description: INTERVENTIONS:  -  Assess patient's ability to carry out ADLs; assess patient's baseline for ADL function and identify physical deficits which impact ability to perform ADLs (bathing, care of mouth/teeth, toileting, grooming, dressing, etc )  - Assess/evaluate cause of self-care deficits   - Assess range of motion  - Assess patient's mobility; develop plan if impaired  - Assess patient's need for assistive devices and provide as appropriate  - Encourage maximum independence but intervene and supervise when necessary  - Involve family in performance of ADLs  - Assess for home care needs following discharge   - Consider OT consult to assist with ADL evaluation and planning for discharge  - Provide patient education as appropriate  Outcome: Progressing  Goal: Maintains/Returns to pre admission functional level  Description: INTERVENTIONS:  - Perform BMAT or MOVE assessment daily    - Set and communicate daily mobility goal to care team and patient/family/caregiver  - Collaborate with rehabilitation services on mobility goals if consulted  - Perform Range of Motion prn times a day  - Reposition patient every self hours    - Dangle patient prn times a day  - Stand patient prn times a day  - Ambulate patient prn times a day  - Out of bed to chair prn times a day   - Out of bed for meals prn times a day  - Out of bed for toileting  - Record patient progress and toleration of activity level   Outcome: Progressing     Problem: MOBILITY - ADULT  Goal: Maintain or return to baseline ADL function  Description: INTERVENTIONS:  -  Assess patient's ability to carry out ADLs; assess patient's baseline for ADL function and identify physical deficits which impact ability to perform ADLs (bathing, care of mouth/teeth, toileting, grooming, dressing, etc )  - Assess/evaluate cause of self-care deficits   - Assess range of motion  - Assess patient's mobility; develop plan if impaired  - Assess patient's need for assistive devices and provide as appropriate  - Encourage maximum independence but intervene and supervise when necessary  - Involve family in performance of ADLs  - Assess for home care needs following discharge   - Consider OT consult to assist with ADL evaluation and planning for discharge  - Provide patient education as appropriate  Outcome: Progressing  Goal: Maintains/Returns to pre admission functional level  Description: INTERVENTIONS:  - Perform BMAT or MOVE assessment daily    - Set and communicate daily mobility goal to care team and patient/family/caregiver  - Collaborate with rehabilitation services on mobility goals if consulted  - Perform Range of Motion prn times a day  - Reposition patient every  self hours    - Dangle patient prn times a day  - Stand patient prn times a day  - Ambulate patient prn times a day  - Out of bed to chair prn times a day   - Out of bed for meals prn times a day  - Out of bed for toileting  - Record patient progress and toleration of activity level   Outcome: Progressing

## 2023-03-23 NOTE — DISCHARGE INSTR - AVS FIRST PAGE
Continue to take the prescribed bowel regimen  Even if you are having regular stools it can still help with transit  If you start having loose stools or diarrhea, you can hold off    If you have worsening abdominal pain, nausea, vomiting or any other concerns please call your PCP or seek care in ED

## 2023-03-24 NOTE — ASSESSMENT & PLAN NOTE
Lab Results   Component Value Date    HGBA1C 5 5 03/17/2023       Recent Labs     03/22/23  1606 03/22/23 2058 03/23/23  0657 03/23/23  1059   POCGLU 129 170* 100 134       Blood Sugar Average: Last 72 hrs:  (P) 128 9   · Update A1c  · Hold metformin  · Continue lantus 25 units h s   · SSI and acu-checks

## 2023-03-24 NOTE — UTILIZATION REVIEW
NOTIFICATION OF ADMISSION DISCHARGE   This is a Notification of Discharge from [address]  Please be advised that this patient has been discharge from our facility  Below you will find the admission and discharge date and time including the patient’s disposition  UTILIZATION REVIEW CONTACT:  [address] [place]  Utilization   Network Utilization Review Department  Phone: [phone] x carefully listen to the prompts  All voicemails are confidential   Email: [email or web address]     ADMISSION INFORMATION  PRESENTATION DATE: 3/17/2023  8:37 PM  OBERVATION ADMISSION DATE:   INPATIENT ADMISSION DATE: 3/18/23  1:23 PM   DISCHARGE DATE: 3/23/2023  1:11 PM   DISPOSITION:Home/Self Care    IMPORTANT INFORMATION:  Send all requests for admission clinical reviews, approved or denied determinations and any other requests to dedicated fax number below belonging to the campus where the patient is receiving treatment   List of dedicated fax numbers:  [address] DENIALS (Administrative/Medical Necessity) [phone]   [address] (Maternity/NICU/Pediatrics) [phone]   [facility] [phone]   [place] [address] [phone]   [address] [phone]   220 [facility] [phone]   90 [facility] [phone]   [address] [place] [address] 119 [phone]   [facility]  [phone] [facility] [phone]   412 [facility] [address] [facility] [phone]

## 2023-03-24 NOTE — DISCHARGE SUMMARY
[address]  Discharge- Jerod Stable 1965, 56 y o  female MRN: [id number]  Unit/Bed#: -01 Encounter: [id number]  Primary Care Provider: Sabra Garsia MD   Date and time admitted to hospital: 3/17/2023  8:37 PM    * Abdominal pain  Assessment & Plan  · Presents with abdominal pain, distension, N/V progressing over past week  Notes last BM 5 days prior  Not passing flatus  Not improved with home Miralax and prune juice  · With admission for same in January  Constipation appears to continue to be driving factor of symptoms  Now on daily Miralax without improvement  Consider addition of Senna once taking PO  · CT abd/pelvis: findings concerning for gastritis  Fluid-filled colon and multiple small bowel loops but no wall thickening or hyperemia suggestive of laxative use  · AFVSS  WBC 11 29  Tachycardia improving with IVF  Lactic 3 0  · Remove NG tube as patient abdominal pain has improved  Patient had a liquid bowel movement, but no solid BM  · Will start on clear liquid diet as patient has loose stools since the last night after enema  · GI saw patient and she is status post EGD and colonoscopy, will follow up with GI    Cigarette smoker  Assessment & Plan  · NRT ordered    Diabetes mellitus type 2 in obese [facility])  Assessment & Plan  Lab Results   Component Value Date    HGBA1C 5 5 03/17/2023       Recent Labs     03/22/23  1606 03/22/23  2058 03/23/23  0657 03/23/23  1059   POCGLU 129 170* 100 134       Blood Sugar Average: Last 72 hrs:  (P) 128 9   · Update A1c  · Hold metformin  · Continue lantus 25 units h s   · SSI and acu-checks    Elevated lactic acid level  Assessment & Plan  · Lactic 3 0   AG 14, suspect due to dehydration with ongoing vomiting and decreased PO intake  · Both have resolved with IVF  · Continue IVF hydration  · Trend lactic/BMP    Generalized anxiety disorder  Assessment & Plan  · Continue home medication regimen    Essential hypertension  Assessment & Plan  · Blood pressure controlled off of oral antihypertensives, which were held on admission due to NPO status  · Continue to monitor      Medical Problems     Resolved Problems  Date Reviewed: 3/21/2023   None       Discharging Physician / Practitioner: Sean Chirinos  PCP: Juan Bella MD  Admission Date:   Admission Orders (From admission, onward)     Ordered        03/18/23 1323  Inpatient Admission  Once            03/17/23 2330  Place in Observation  Once                      Discharge Date: 03/23/23    Consultations During Hospital Stay:  · Gastroenterology  · General Surgery    Procedures Performed:   · EGD and colonoscopy 3/21    Significant Findings / Test Results:   EGD-Gastritis with gastric erosion, large amount of bile and some liquid food in the stomach//2  Normal esophagus, normal GE junction, normal duodenum  1  Colonoscopy- Ascending colon polyp removed // Normal colonic mucosa //Normal terminal ileum //Small internal hemorrhoids  CT abd/pelvis 3/17- Findings concerning for gastritis  Recommend correlation with patient's symptoms  Fluid-filled colon and multiple small bowel loops but no wall thickening or hyperemia suggestive of laxative use  Test Results Pending at Discharge (will require follow up): · Awaiting Pathology     Outpatient Tests Requested:  · None    Complications:  None    Reason for Admission: Abdominal pain    Hospital Course:   Stephany Castillo is a 56 y o  female patient who originally presented to the hospital on 3/17/2023 due to abdominal pain  She has a history of constipation and has had surgery with abdominal pain related to adhesions in the past   She presented for not having a bowel movement about 5 days prior to arrival   CT showed signs of gastritis, did not really seem to have significant stool burden  She was admitted and GI and general surgery were consulted  Patient responded well to NG tube, supportive IVF, and other pain control measures    Eventually "the NG was removed and patient's diet was advanced  She still was having extreme difficulty with passing stool during her stay, so GI recommended doing an EGD and colonoscopy in house (originally they were going to be planned for the outpatient)  Patient responded very well to the bowel prep with improvement in her abdominal pain after moving her stools  EGD and colonoscopy summary of results can be read above  Patient was discharged on Protonix with plans to follow-up with GI  She was encouraged to take medical therapy to ensure regularity with MiraLAX and Colace  She usually medicates with coffee and juice which seems to help her bowels somewhat  She requested a course of opiate medications, but I advised extremely judicious use as that could worsen slow transit in the colon  She voices understanding of this    Please see above list of diagnoses and related plan for additional information  Condition at Discharge: fair    Discharge Day Visit / Exam:   Subjective:  Sitting in bed in no acute distress  Vitals: Blood Pressure: 127/85 (03/23/23 0819)  Pulse: 75 (03/23/23 0819)  Temperature: 98 2 °F (36 8 °C) (03/23/23 0819)  Temp Source: Oral (03/23/23 0819)  Respirations: 20 (03/23/23 0819)  Height: 5' 3\" (160 cm) (03/17/23 2028)  Weight - Scale: 77 7 kg (171 lb 6 4 oz) (03/19/23 0557)  SpO2: 98 % (03/23/23 0819)  Exam:   Physical Exam  Vitals reviewed  Constitutional:       General: She is not in acute distress  Appearance: She is not ill-appearing  HENT:      Head: Normocephalic  Mouth/Throat:      Mouth: Mucous membranes are moist    Eyes:      General: No scleral icterus  Extraocular Movements: Extraocular movements intact  Cardiovascular:      Rate and Rhythm: Normal rate and regular rhythm  Pulses: Normal pulses  Heart sounds: No murmur heard  No friction rub  No gallop  Pulmonary:      Effort: Pulmonary effort is normal  No respiratory distress        Breath sounds: " No wheezing, rhonchi or rales  Abdominal:      General: Bowel sounds are normal  There is distension  Palpations: Abdomen is soft  Tenderness: There is abdominal tenderness  There is no guarding or rebound  Comments: Improved abdominal pain and distention   Musculoskeletal:      Right lower leg: No edema  Left lower leg: No edema  Skin:     General: Skin is warm  Capillary Refill: Capillary refill takes less than 2 seconds  Coloration: Skin is not jaundiced  Findings: No rash  Neurological:      General: No focal deficit present  Mental Status: She is alert and oriented to person, place, and time  Sensory: No sensory deficit  Motor: No weakness  Psychiatric:         Mood and Affect: Mood normal          Behavior: Behavior normal           Discussion with Family: Patient declined call to   Discharge instructions/Information to patient and family:   See after visit summary for information provided to patient and family  Provisions for Follow-Up Care:  See after visit summary for information related to follow-up care and any pertinent home health orders  Disposition:   Home    Planned Readmission: No     Discharge Statement:  I spent 45 minutes discharging the patient  This time was spent on the day of discharge  I had direct contact with the patient on the day of discharge  Greater than 50% of the total time was spent examining patient, answering all patient questions, arranging and discussing plan of care with patient as well as directly providing post-discharge instructions  Additional time then spent on discharge activities  Discharge Medications:  See after visit summary for reconciled discharge medications provided to patient and/or family        **Please Note: This note may have been constructed using a voice recognition system**

## 2023-03-26 PROBLEM — A41.9 SEPSIS (HCC): Status: RESOLVED | Noted: 2023-01-25 | Resolved: 2023-03-26

## 2023-03-27 ENCOUNTER — TRANSITIONAL CARE MANAGEMENT (OUTPATIENT)
Dept: FAMILY MEDICINE CLINIC | Facility: CLINIC | Age: 58
End: 2023-03-27

## 2023-03-29 DIAGNOSIS — E55.9 VITAMIN D DEFICIENCY: ICD-10-CM

## 2023-03-30 RX ORDER — ERGOCALCIFEROL 1.25 MG/1
CAPSULE ORAL
Qty: 12 CAPSULE | Refills: 1 | Status: SHIPPED | OUTPATIENT
Start: 2023-03-30

## 2023-03-31 ENCOUNTER — OFFICE VISIT (OUTPATIENT)
Dept: FAMILY MEDICINE CLINIC | Facility: CLINIC | Age: 58
End: 2023-03-31

## 2023-03-31 VITALS
DIASTOLIC BLOOD PRESSURE: 80 MMHG | WEIGHT: 165.8 LBS | HEIGHT: 63 IN | SYSTOLIC BLOOD PRESSURE: 130 MMHG | TEMPERATURE: 97.2 F | RESPIRATION RATE: 18 BRPM | HEART RATE: 67 BPM | BODY MASS INDEX: 29.38 KG/M2 | OXYGEN SATURATION: 98 %

## 2023-03-31 DIAGNOSIS — Z91.14 NON COMPLIANCE W MEDICATION REGIMEN: ICD-10-CM

## 2023-03-31 DIAGNOSIS — F10.11 ALCOHOL ABUSE, IN REMISSION: ICD-10-CM

## 2023-03-31 DIAGNOSIS — K29.00 ACUTE GASTRITIS WITHOUT HEMORRHAGE, UNSPECIFIED GASTRITIS TYPE: Primary | ICD-10-CM

## 2023-03-31 DIAGNOSIS — K21.9 GASTROESOPHAGEAL REFLUX DISEASE WITHOUT ESOPHAGITIS: ICD-10-CM

## 2023-03-31 DIAGNOSIS — F06.8 COGNITIVE DYSFUNCTION IN BIPOLAR DISORDER (HCC): ICD-10-CM

## 2023-03-31 DIAGNOSIS — F31.9 COGNITIVE DYSFUNCTION IN BIPOLAR DISORDER (HCC): ICD-10-CM

## 2023-03-31 DIAGNOSIS — Z76.89 ENCOUNTER FOR SUPPORT AND COORDINATION OF TRANSITION OF CARE: ICD-10-CM

## 2023-03-31 NOTE — PROGRESS NOTES
TRANSITION OF CARE OFFICE VISIT  [facility] - [facility] PRIMARY CARE PATRIC    NAME: Stephany Castillo  AGE: 56 y o  SEX: female  : 1965     DATE: 3/31/2023     Assessment and Plan:     Problem List Items Addressed This Visit        Digestive    Gastroesophageal reflux disease without esophagitis       Other    Alcohol abuse, in remission    Non compliance w medication regimen    Cognitive dysfunction in bipolar disorder ([address] )   Other Visit Diagnoses     Acute gastritis without hemorrhage, unspecified gastritis type    -  Primary    Encounter for support and coordination of transition of care        [facility] admission 3/17-3/23/23  severe abdominal pain  EGD revealed gastitis with erosions  PPI BID        gastritis slowly improving  Advised to schedule follow up with [facility]  Cont ppi  Strong encouraged smoking cessation  Continue stool softeners  Benzie diet  Call with relapse of s/s    I have spent a total time of 30 minutes on 23 in caring for this patient including Diagnostic results, Prognosis, Risks and benefits of tx options, Instructions for management, Patient and family education, Importance of tx compliance, Risk factor reductions, Impressions, Counseling / Coordination of care, Documenting in the medical record, Reviewing / ordering tests, medicine, procedures   and Obtaining or reviewing history           Transitional Care Management Review:     Stephany Castillo is a 56 y o  female here for TCM follow-up    During the TCM phone call patient stated:    TCM Call     Date and time call was made  3/27/2023  7:11 PM    Hospital care reviewed  Records reviewed    Patient was hospitialized at  [facility]    Date of Admission  23    Date of discharge  23    Diagnosis  abdominal pain    Disposition  Home    Were the patients medications reviewed and updated  Yes    Current Symptoms  Vomitting    Vomitting severity  Moderate  patient stated she vomites in the morning only      TCM Call     Post hospital issues  None    Should patient be enrolled in anticoag monitoring? No    Scheduled for follow up? Yes    Patients specialists  [facility]    Did you obtain your prescribed medications  Yes    Do you need help managing your prescriptions or medications  No    Is transportation to your appointment needed  No    I have advised the patient to call PCP with any new or worsening symptoms  Devon Dunn MA           HPI:     Seen for discharge follow up  [facility]ed to [facility] with severe abdominal pain  EGD revealed acute gastritis, erosions  She was advised smoking cessation, bland diet, PPI BID  She continues to smoke  Seems to not be eating blandly  Ate BBQ ribs yesterday  Is not drinking ETOH -last drink 2 years ago    She does endorse that she is feeling better  She is moving her bowels  Denies recent N/V    The following portions of the patient's history were reviewed and updated as appropriate: allergies, current medications, past family history, past medical history, past social history, past surgical history and problem list      Review of Systems:     Review of Systems   Constitutional: Positive for fatigue  Negative for fever  HENT: Negative for trouble swallowing  Respiratory: Negative for cough and shortness of breath  Cardiovascular: Negative for chest pain and palpitations  Gastrointestinal: Positive for abdominal pain and constipation  Negative for abdominal distention, blood in stool, nausea and vomiting  Genitourinary: Negative for difficulty urinating and dysuria  Neurological: Negative for dizziness and weakness          Problem List:     Patient Active Problem List   Diagnosis   • Abdominal pain   • Essential hypertension   • Alcohol abuse, in remission   • Post-traumatic stress disorder, chronic   • Generalized anxiety disorder   • Elevated lactic acid level   • Bipolar disorder current episode depressed ([facility] [address] )   • "Bipolar 1 disorder, depressed ([place] [address] )   • Benzodiazepine dependence, continuous ([place] [address] )   • Non compliance w medication regimen   • Acquired hypothyroidism   • Hypokalemia   • Hyperlipemia   • Transaminitis   • Cognitive dysfunction in bipolar disorder ([facility])   • Medical clearance for psychiatric admission   • Tobacco abuse   • Toxic encephalopathy   • Gastroesophageal reflux disease without esophagitis   • Increased anion gap metabolic acidosis   • STI (sexually transmitted infection)   • Chest pressure   • Diabetes mellitus type 2 in obese ([place] [address] )   • Primary hypertension   • Hypothyroidism   • Vitamin D deficiency   • Tiredness   • Obesity, morbid ([facility])   • Tachypnea   • Hypertensive urgency   • Tachycardia   • Bronchitis with acute wheezing   • Cigarette smoker   • Chronic cough   • Screening for blood or protein in urine   • Pulmonary emphysema, unspecified emphysema type ([facility])   • Long-term use of high-risk medication   • Migraine without aura and without status migrainosus, not intractable        Objective:     /80 (BP Location: Left arm, Patient Position: Sitting, Cuff Size: Adult)   Pulse 67   Temp (!) 97 2 °F (36 2 °C) (Temporal)   Resp 18   Ht 5' 3\" (1 6 m)   Wt 75 2 kg (165 lb 12 8 oz)   SpO2 98%   BMI 29 37 kg/m²     Physical Exam  Vitals and nursing note reviewed  Constitutional:       General: She is not in acute distress  Appearance: Normal appearance  Cardiovascular:      Rate and Rhythm: Normal rate and regular rhythm  Pulses: Normal pulses  Pulmonary:      Effort: Pulmonary effort is normal       Breath sounds: Normal breath sounds  Abdominal:      General: Bowel sounds are normal       Palpations: Abdomen is soft  Tenderness: There is abdominal tenderness  There is no guarding  Skin:     Coloration: Skin is not pale  Neurological:      General: No focal deficit present  Mental Status: She is alert  Motor: No weakness        Gait: Gait normal  " Psychiatric:         Mood and Affect: Mood normal           Laboratory Results: I have personally reviewed the pertinent laboratory results/reports     Radiology/Other Diagnostic Testing Results: I have personally reviewed pertinent reports  XR abdomen 1 view kub    Result Date: 3/18/2023  ABDOMEN INDICATION:   abd pain  COMPARISON:  CT abdomen pelvis exam dated one day prior and abdomen radiographs dated 1/25/2023 VIEWS:  AP supine FINDINGS: There is a nonspecific nonobstructive bowel gas pattern  No discernible free air on this supine study  Upright or left lateral decubitus imaging is more sensitive to detect subtle free air in the appropriate setting  No pathologic calcifications or soft tissue masses  Surgical clips seen in the right upper quadrant and pelvis Visualized lung bases are clear  Visualized osseous structures are unremarkable for the patient's age  Nonspecific nonobstructive bowel gas pattern  Workstation performed: [id number]     CT abdomen pelvis with contrast    Result Date: 3/17/2023  CT ABDOMEN AND PELVIS WITH IV CONTRAST INDICATION:   generalized abdominal pain, nausea, constipation  COMPARISON:  Most recent prior CT scan is dated January 25, 2023  TECHNIQUE:  CT examination of the abdomen and pelvis was performed  Axial, sagittal, and coronal 2D reformatted images were created from the source data and submitted for interpretation  Radiation dose length product (DLP) for this visit:  524 mGy-cm   This examination, like all CT scans performed in the [facility], was performed utilizing techniques to minimize radiation dose exposure, including the use of iterative reconstruction and automated exposure control  IV Contrast:  100 mL of iohexol (OMNIPAQUE) Enteric Contrast:  Enteric contrast was not administered  FINDINGS: ABDOMEN LOWER CHEST:  Unremarkable   LIVER/BILIARY TREE:  Few low-attenuation lesions scattered throughout the liver which have been unchanged since at least 2000 compatible with a benign etiology  GALLBLADDER:  Gallbladder is surgically absent  SPLEEN:  Unremarkable  PANCREAS:  Unremarkable  ADRENAL GLANDS:  Unremarkable  KIDNEYS/URETERS:  Unremarkable  No hydronephrosis  STOMACH AND BOWEL:  Fluid-filled small bowel and colon with no bowel wall thickening or hyperemia of the bowel wall, findings which could be related to laxative use  Mild gastric wall thickening and mucosal hyperemia suggestive of gastritis  No adjacent inflammatory fat stranding  APPENDIX:  No findings to suggest appendicitis  ABDOMINOPELVIC CAVITY:  No ascites  No pneumoperitoneum  No lymphadenopathy  VESSELS:  Unremarkable for patient's age  PELVIS REPRODUCTIVE ORGANS:  Surgical changes of prior hysterectomy  URINARY BLADDER:  Unremarkable  ABDOMINAL WALL/INGUINAL REGIONS:  Unremarkable  OSSEOUS STRUCTURES:  No acute fracture or destructive osseous lesion  Findings concerning for gastritis  Recommend correlation with patient's symptoms  Fluid-filled colon and multiple small bowel loops but no wall thickening or hyperemia suggestive of laxative use  Workstation performed: [id number]        Current Medications:     Outpatient Medications Prior to Visit   Medication Sig Dispense Refill   • Alcohol Swabs 70 % PADS May substitute brand based on insurance coverage  Check glucose TID  100 each 0   • ARIPiprazole (ABILIFY) 10 mg tablet Take 1 tablet (10 mg total) by mouth daily 30 tablet 0   • atorvastatin (LIPITOR) 20 mg tablet Take 1 tablet (20 mg total) by mouth daily 90 tablet 1   • Blood Glucose Monitoring Suppl (OneTouch Verio Reflect) w/Device KIT May substitute brand based on insurance coverage   Check glucose TID  1 kit 0   • clonazePAM (KlonoPIN) 0 5 mg tablet Take 1 tablet (0 5 mg total) by mouth 2 (two) times a day as needed for seizures 60 tablet 0   • docusate sodium (COLACE) 100 mg capsule Take 1 capsule (100 mg total) by mouth 2 (two) times a day 60 capsule 0   • docusate sodium (COLACE) 100 mg capsule Take 1 capsule (100 mg total) by mouth 2 (two) times a day for 14 days 28 capsule 0   • ergocalciferol (VITAMIN D2) 50,000 units TAKE 1 CAPSULE BY MOUTH 1 TIME A WEEK 12 capsule 1   • escitalopram (LEXAPRO) 10 mg tablet TAKE 1 TABLET(10 MG) BY MOUTH DAILY 90 tablet 1   • gabapentin (NEURONTIN) 800 mg tablet Take 1 tablet (800 mg total) by mouth 3 (three) times a day 90 tablet 4   • hydrochlorothiazide (HYDRODIURIL) 12 5 mg tablet Take 1 tablet (12 5 mg total) by mouth daily 90 tablet 2   • insulin glargine (LANTUS) 100 units/mL subcutaneous injection Inject 25 Units under the skin daily at bedtime 10 mL 0   • levothyroxine 25 mcg tablet Take 1 tablet (25 mcg total) by mouth daily in the early morning 90 tablet 1   • metFORMIN (GLUCOPHAGE) 1000 MG tablet TAKE 1 TABLET(1000 MG) BY MOUTH TWICE DAILY WITH MEALS 180 tablet 1   • metoprolol tartrate (LOPRESSOR) 50 mg tablet Take 1 tablet (50 mg total) by mouth every 12 (twelve) hours 60 tablet 5   • Microlet Lancets MISC      • OneTouch Delica Lancets 68G MISC May substitute brand based on insurance coverage  Check glucose TID  100 each 0   • pantoprazole (PROTONIX) 40 mg tablet Take 1 tablet (40 mg total) by mouth 2 (two) times a day 60 tablet 0   • polyethylene glycol (MIRALAX) 17 g packet Take 17 g by mouth daily Do not start before January 28, 2023  30 each 0   • polyethylene glycol (MIRALAX) 17 g packet Take 17 g by mouth daily for 14 days 238 g 0   • semaglutide, 1 mg/dose, (Ozempic) 4 mg/3 mL injection pen Inject 0 75 mL (1 mg total) under the skin every 7 days 3 mL 5   • traZODone (DESYREL) 100 mg tablet TAKE 2 TABLETS(200 MG) BY MOUTH DAILY AT BEDTIME 60 tablet 2     No facility-administered medications prior to visit         YONG Wise  [facility]

## 2023-04-04 ENCOUNTER — TELEPHONE (OUTPATIENT)
Dept: FAMILY MEDICINE CLINIC | Facility: CLINIC | Age: 58
End: 2023-04-04

## 2023-04-04 DIAGNOSIS — F31.4 BIPOLAR DISORDER, CURRENT EPISODE DEPRESSED, SEVERE, WITHOUT PSYCHOTIC FEATURES (HCC): ICD-10-CM

## 2023-04-04 RX ORDER — CLONAZEPAM 0.5 MG/1
0.5 TABLET ORAL 2 TIMES DAILY PRN
Qty: 60 TABLET | Refills: 0 | Status: SHIPPED | OUTPATIENT
Start: 2023-04-04

## 2023-04-04 NOTE — TELEPHONE ENCOUNTER
Mik Balderrama 1965 I'm calling to see if Doctor Zain Dukes can prescribe something for nausea and pain and also I need a refill on my Klonopin and my phone number is [phone]

## 2023-04-04 NOTE — TELEPHONE ENCOUNTER
Clonazepam refilled  Pt was to follow up with her gastroenterologist after discharge from the hospital  Appointment not yet made   She should be taking her stool softener and miralax, pantoprazole

## 2023-04-04 NOTE — TELEPHONE ENCOUNTER
Patient called again she would like something for nausea and something for gastritis pain    She also needs a refill on her klonopin [facility] to [facility]

## 2023-04-05 ENCOUNTER — VBI (OUTPATIENT)
Dept: ADMINISTRATIVE | Facility: OTHER | Age: 58
End: 2023-04-05

## 2023-04-06 NOTE — ASSESSMENT & PLAN NOTE
Anesthesia Pre Eval Note    Anesthesia ROS/Med Hx        Anesthetic Complication History:  Patient does not have a history of anesthetic complications      Pulmonary Review:    The patient is a current smoker.     Neuro/Psych Review:    Negative for psychiatric history - Depression    Cardiovascular Review:    Positive for hypertension    GI/HEPATIC/RENAL Review:    Positive for GERD    End/Other Review:  Patient does not have an endo/other history    Additional Results:     ALLERGIES:   -- Amoxicillin-Pot Clavulanate -- VOMITING     No past medical history on file.    Past Surgical History:  No date: Nasal scopy,open maxill sinus  No date: Tubal ligation       Prior to Admission medications :  Medication metoPROLOL succinate (TOPROL-XL) 25 MG 24 hr tablet, Sig , Start Date 3/26/23, End Date , Taking? , Authorizing Provider Outside Provider    Medication buPROPion (WELLBUTRIN SR) 150 MG 12 hr tablet, Sig , Start Date 2/10/23, End Date , Taking? , Authorizing Provider Outside Provider    Medication omeprazole (PriLOSEC) 40 MG capsule, Sig , Start Date , End Date , Taking? , Authorizing Provider Outside Provider         Patient Vitals in the past 24 hrs:      Relevant Problems   No relevant active problems       Physical Exam     Airway   Mallampati: II  TM Distance: >3 FB  Neck ROM: Full  Neck: Non-tender and Able to place in sniff position  TMJ Mobility: Good    Cardiovascular  Cardiovascular exam normal  Cardio Rhythm: Regular  Cardio Rate: Normal    Head Assessment  Head assessment: Normocephalic and Atraumatic    General Assessment  General Assessment: Alert and oriented and No acute distress    Dental Exam    Patient has:  Denied broken/chipped/loose teeth and Implants    Pulmonary Exam  Pulmonary exam normal  Breath sounds clear to auscultation:  Yes    Abdominal Exam  Abdominal exam normal      Anesthesia Plan:    ASA Status: 2  Anesthesia Type: General    Induction: Intravenous  Preferred Airway Type: Nasal  · Hypotensive on admission- currently on vasopressor therapy  · Antihypertensives on hold until BP has normalized CannulaPatient does not have a difficult airway or is not at risk of aspiration.   Maintenance: TIVA  Premedication: None  Patient does not have an implantable electronic device requiring post procedure programming.     Post-op Pain Management: Per Surgeon      Checklist  Reviewed: NPO Status, Allergies, Medications, Problem list, Past Med History and Patient Summary  Consent/Risks Discussed Statement:  The proposed anesthetic plan, including its risks and benefits, have been discussed with the Patient along with the risks and benefits of alternatives. Questions were encouraged and answered and the patient and/or representative understands and agrees to proceed.        I discussed with the patient (and/or patient's legal representative) the risks and benefits of the proposed anesthesia plan, General, which may include services performed by other anesthesia providers.    Alternative anesthesia plans, if available, were reviewed with the patient (and/or patient's legal representative). Discussion has been held with the patient (and/or patient's legal representative) regarding risks of anesthesia, which include Nausea, Vomiting, Dental Injury, allergic reaction, anxiety, aspiration, back pain, bleeding/hematoma, depressed breathing, dental injury, emergence delirium, eye injury, headache, infection, intra-operative awareness, memory loss, nausea, nerve injury, oral injury, organ damage, persistent pain, sore throat and vomiting and emergent situations that may require change in anesthesia plan.    The patient (and/or patient's legal representative) has indicated understanding, his/her questions have been answered, and he/she wishes to proceed with the planned anesthetic.    Blood Products: Not Anticipated

## 2023-04-24 ENCOUNTER — EPISODE CHANGES (OUTPATIENT)
Dept: CASE MANAGEMENT | Facility: OTHER | Age: 58
End: 2023-04-24

## 2023-04-25 ENCOUNTER — PATIENT OUTREACH (OUTPATIENT)
Dept: FAMILY MEDICINE CLINIC | Facility: CLINIC | Age: 58
End: 2023-04-25

## 2023-05-01 ENCOUNTER — OFFICE VISIT (OUTPATIENT)
Dept: FAMILY MEDICINE CLINIC | Facility: CLINIC | Age: 58
End: 2023-05-01

## 2023-05-01 VITALS
TEMPERATURE: 96.9 F | OXYGEN SATURATION: 97 % | HEART RATE: 104 BPM | BODY MASS INDEX: 27.29 KG/M2 | HEIGHT: 63 IN | DIASTOLIC BLOOD PRESSURE: 80 MMHG | SYSTOLIC BLOOD PRESSURE: 118 MMHG | RESPIRATION RATE: 20 BRPM | WEIGHT: 154 LBS

## 2023-05-01 DIAGNOSIS — E66.9 DIABETES MELLITUS TYPE 2 IN OBESE (HCC): Primary | ICD-10-CM

## 2023-05-01 DIAGNOSIS — B35.1 ONYCHOMYCOSIS: ICD-10-CM

## 2023-05-01 DIAGNOSIS — K21.9 GASTROESOPHAGEAL REFLUX DISEASE WITHOUT ESOPHAGITIS: ICD-10-CM

## 2023-05-01 DIAGNOSIS — F31.9 BIPOLAR 1 DISORDER, DEPRESSED (HCC): ICD-10-CM

## 2023-05-01 DIAGNOSIS — K29.00 ACUTE GASTRITIS WITHOUT HEMORRHAGE, UNSPECIFIED GASTRITIS TYPE: ICD-10-CM

## 2023-05-01 DIAGNOSIS — E11.69 DIABETES MELLITUS TYPE 2 IN OBESE (HCC): Primary | ICD-10-CM

## 2023-05-02 ENCOUNTER — NURSE TRIAGE (OUTPATIENT)
Dept: OTHER | Facility: OTHER | Age: 58
End: 2023-05-02

## 2023-05-02 DIAGNOSIS — R11.2 NAUSEA AND VOMITING, UNSPECIFIED VOMITING TYPE: ICD-10-CM

## 2023-05-02 DIAGNOSIS — J40 BRONCHITIS: ICD-10-CM

## 2023-05-02 RX ORDER — PROMETHAZINE HYDROCHLORIDE 12.5 MG/1
12.5 SUPPOSITORY RECTAL EVERY 6 HOURS PRN
Qty: 12 EACH | Refills: 0 | Status: SHIPPED | OUTPATIENT
Start: 2023-05-02 | End: 2023-05-03 | Stop reason: SDUPTHER

## 2023-05-02 RX ORDER — PANTOPRAZOLE SODIUM 40 MG/1
40 TABLET, DELAYED RELEASE ORAL 2 TIMES DAILY
Qty: 60 TABLET | Refills: 1 | Status: SHIPPED | OUTPATIENT
Start: 2023-05-02 | End: 2023-05-03 | Stop reason: SDUPTHER

## 2023-05-02 RX ORDER — ONDANSETRON 4 MG/1
4 TABLET, FILM COATED ORAL EVERY 8 HOURS PRN
Qty: 20 TABLET | Refills: 0 | Status: SHIPPED | OUTPATIENT
Start: 2023-05-02 | End: 2023-05-03 | Stop reason: SDUPTHER

## 2023-05-02 NOTE — PROGRESS NOTES
Assessment/Plan:    1  Diabetes mellitus type 2 in obese [facility])  -     Ambulatory Referral to Podiatry; Future    2  Onychomycosis  -     Ambulatory Referral to Podiatry; Future    3  Gastroesophageal reflux disease without esophagitis    4  Bipolar 1 disorder, depressed ([facility] [address] )    5  Acute gastritis without hemorrhage, unspecified gastritis type        The case discussed with patient using patient centered shared decision making  The patient was counseled regarding instructions for management,-- risk factor reductions,-- prognosis,-- impressions,-- risks and benefits of treatment options,-- importance of compliance with treatment  I have reviewed the instructions with the patient, answering all questions to her satisfaction  Patient still having waxing and waning abd pain  She endorses that she is compliant with gastritis diet and rx   She is having gastric emptying study end of month    Advised to continue plan of care  Consider biofeedback and behavioral therapy with therapist to help cope with pain  Recommend f/u with gastro if symptoms progress    There are no Patient Instructions on file for this visit  Return in about 6 weeks (around 6/12/2023)  I have spent a total time of 30 minutes on 05/02/23 in caring for this patient including Diagnostic results, Prognosis, Risks and benefits of tx options, Instructions for management, Patient and family education, Importance of tx compliance, Risk factor reductions, Impressions, Counseling / Coordination of care, Documenting in the medical record, Reviewing / ordering tests, medicine, procedures   and Obtaining or reviewing history    Subjective:      Patient ID: Nicolas Oconnell is a 56 y o  female      Chief Complaint   Patient presents with   [facility] follows up for ongoing gastritis pain  Is under the care of gastro and recently saw her provider   Patient still having waxing and waning abd pain  She endorses that she is compliant with gastritis diet and rx   She is having gastric emptying study end of month  She has more anxiety and depression lately-her father is terminal   She sees therapist, psychiatrist      The following portions of the patient's history were reviewed and updated as appropriate: allergies, current medications, past family history, past medical history, past social history, past surgical history and problem list     Review of Systems   Constitutional: Positive for fatigue  Negative for fever  HENT: Negative for trouble swallowing  Respiratory: Negative for cough and shortness of breath  Cardiovascular: Negative for chest pain  Gastrointestinal: Positive for abdominal pain and constipation  Negative for blood in stool  Genitourinary: Negative for difficulty urinating  Neurological: Negative for dizziness and weakness  Psychiatric/Behavioral: Positive for decreased concentration and dysphoric mood  Current Outpatient Medications   Medication Sig Dispense Refill    Alcohol Swabs 70 % PADS May substitute brand based on insurance coverage  Check glucose TID  100 each 0    ARIPiprazole (ABILIFY) 10 mg tablet Take 1 tablet (10 mg total) by mouth daily 30 tablet 0    atorvastatin (LIPITOR) 20 mg tablet Take 1 tablet (20 mg total) by mouth daily 90 tablet 1    Blood Glucose Monitoring Suppl (OneTouch Verio Reflect) w/Device KIT May substitute brand based on insurance coverage   Check glucose TID  1 kit 0    clonazePAM (KlonoPIN) 0 5 mg tablet Take 1 tablet (0 5 mg total) by mouth 2 (two) times a day as needed for seizures 60 tablet 0    docusate sodium (COLACE) 100 mg capsule Take 1 capsule (100 mg total) by mouth 2 (two) times a day 60 capsule 0    ergocalciferol (VITAMIN D2) 50,000 units TAKE 1 CAPSULE BY MOUTH 1 TIME A WEEK 12 capsule 1    escitalopram (LEXAPRO) 10 mg tablet TAKE 1 TABLET(10 MG) BY MOUTH DAILY 90 tablet 1    gabapentin (NEURONTIN) 800 mg tablet TAKE 1 TABLET(800 MG) BY MOUTH THREE TIMES DAILY 90 tablet 4    hydrochlorothiazide (HYDRODIURIL) 12 5 mg tablet Take 1 tablet (12 5 mg total) by mouth daily 90 tablet 2    insulin glargine (LANTUS) 100 units/mL subcutaneous injection Inject 25 Units under the skin daily at bedtime 10 mL 0    levothyroxine 25 mcg tablet Take 1 tablet (25 mcg total) by mouth daily in the early morning 90 tablet 1    metFORMIN (GLUCOPHAGE) 1000 MG tablet TAKE 1 TABLET(1000 MG) BY MOUTH TWICE DAILY WITH MEALS 180 tablet 1    metoprolol tartrate (LOPRESSOR) 50 mg tablet Take 1 tablet (50 mg total) by mouth every 12 (twelve) hours 60 tablet 5    Microlet Lancets MISC       ondansetron (ZOFRAN) 4 mg tablet Take 1 tablet (4 mg total) by mouth every 8 (eight) hours as needed for nausea or vomiting 20 tablet 0    OneTouch Delica Lancets 68G MISC May substitute brand based on insurance coverage  Check glucose TID  100 each 0    semaglutide, 1 mg/dose, (Ozempic) 4 mg/3 mL injection pen Inject 0 75 mL (1 mg total) under the skin every 7 days 3 mL 5    sucralfate (CARAFATE) 1 g tablet Take 1 tablet (1 g total) by mouth 4 (four) times a day 120 tablet 1    traZODone (DESYREL) 100 mg tablet TAKE 2 TABLETS(200 MG) BY MOUTH DAILY AT BEDTIME 60 tablet 2    docusate sodium (COLACE) 100 mg capsule Take 1 capsule (100 mg total) by mouth 2 (two) times a day for 14 days 28 capsule 0    pantoprazole (PROTONIX) 40 mg tablet Take 1 tablet (40 mg total) by mouth 2 (two) times a day 60 tablet 0    polyethylene glycol (MIRALAX) 17 g packet Take 17 g by mouth daily Do not start before January 28, 2023  (Patient not taking: Reported on 5/1/2023) 30 each 0    polyethylene glycol (MIRALAX) 17 g packet Take 17 g by mouth daily for 14 days (Patient not taking: Reported on 4/12/2023) 238 g 0     No current facility-administered medications for this visit         Objective:    /80 (BP Location: Left arm, Patient Position: Sitting, Cuff Size: Standard)   Pulse 104   Temp "(!) 96 9 °F (36 1 °C)   Resp 20   Ht 5' 3\" (1 6 m)   Wt 69 9 kg (154 lb)   SpO2 97%   BMI 27 28 kg/m²        Physical Exam  Vitals and nursing note reviewed  Constitutional:       Appearance: Normal appearance  Comments: Appears uncomfortable   Cardiovascular:      Rate and Rhythm: Normal rate and regular rhythm  Pulses: Normal pulses  Pulmonary:      Effort: Pulmonary effort is normal       Breath sounds: Normal breath sounds  Abdominal:      General: Bowel sounds are normal  There is no distension  Palpations: Abdomen is soft  There is no mass  Tenderness: There is no abdominal tenderness  Comments: Exam not consistent with active gastritis   Skin:     Coloration: Skin is not pale  Neurological:      General: No focal deficit present  Mental Status: She is alert  Mental status is at baseline                  YONG Eddy  "

## 2023-05-02 NOTE — TELEPHONE ENCOUNTER
"Regarding: pain in stomach / vomiting / weight lose  ----- Message from Daryn Floyd sent at 5/2/2023  3:05 PM EDT -----  \"Im having pain on my right upper side of my stomach and out of no where I end up throwing up, i've lost a lot of weight as well\"    "

## 2023-05-02 NOTE — TELEPHONE ENCOUNTER
"  Reason for Disposition   MODERATE pain (e g , interferes with normal activities that comes and goes (cramps) lasts > 24 hours (Exception: pain with Vomiting or Diarrhea - see that Protocol)    Answer Assessment - Initial Assessment Questions  1  LOCATION: \"Where does it hurt? \"       Right upper side of stomach     2  RADIATION: \"Does the pain shoot anywhere else? \" (e g , chest, back)      Back     3  ONSET: \"When did the pain begin? \" (e g , minutes, hours or days ago)       Ongoing for a couple weeks to months     4  SUDDEN: \"Gradual or sudden onset? \"     Gradual     5  PATTERN \"Does the pain come and go, or is it constant? \"     - If constant: \"Is it getting better, staying the same, or worsening? \"       (Note: Constant means the pain never goes away completely; most serious pain is constant and it progresses)      - If intermittent: \"How long does it last?\" \"Do you have pain now? \"      (Note: Intermittent means the pain goes away completely between bouts)      Comes and goes     6  SEVERITY: \"How bad is the pain? \"  (e g , Scale 1-10; mild, moderate, or severe)    - MILD (1-3): doesn't interfere with normal activities, abdomen soft and not tender to touch     - MODERATE (4-7): interferes with normal activities or awakens from sleep, tender to touch     - SEVERE (8-10): excruciating pain, doubled over, unable to do any normal activities       5/10    7  RECURRENT SYMPTOM: \"Have you ever had this type of stomach pain before? \" If Yes, ask: \"When was the last time? \" and \"What happened that time? \"       Yes    8  CAUSE: \"What do you think is causing the stomach pain? \"      Unknown     9  RELIEVING/AGGRAVATING FACTORS: \"What makes it better or worse? \" (e g , movement, antacids, bowel movement)      Nothing makes the pain better or worse     10  OTHER SYMPTOMS: \"Has there been any vomiting, diarrhea, constipation, or urine problems? \"        Vomiting and nauseated, no vomiting today lost 10 pounds over 30 days " "      Zofran was prescribed and it not helping with the nausea or vomiting   11  PREGNANCY: \"Is there any chance you are pregnant? \" \"When was your last menstrual period? \"       N/A    Protocols used: ABDOMINAL PAIN - FEMALE-ADULT-[place]    "

## 2023-05-02 NOTE — TELEPHONE ENCOUNTER
Spoke to patient  She reports daily nausea and intermittent vomiting of bile as well as RUQ/back pain worse with eating  She's moving her bowels every other day without constipation or straining  She has been able to stay hydrated and blood sugars have been okay  She hasn't gone for blood work ordered during recent office visit  Gastric emptying study is scheduled 5/22  Recent EGD/Colonoscopy 3/22/23 showed gastritis with gastric erosions, bile, and liquid food in the stomach; colon and TI were normal, 1 polyp removed  Denies any fevers/chills     -Increase PPI to BID, continue Carafate  -Avoid NSAIDs, can take tylenol for pain  -Follow bland, low fat diet  Push fluids  -Continue Zofran PRN  Phenergan suppository ordered for 2nd line  -Get labs done ASAP  -Pending results consider MRI/MRCP vs repeat CT with enteric contrast  -She is on Ozempic which may contribute to GI symptoms/weight loss as well  -Advised pt if she develops severe pain, fevers/chills, s/s of dehydration such as weakness, dizziness, decreased urine output to present to the ED for further evaluation    -F/u scheduled 5/30 with Dr Ollie Perez

## 2023-05-03 ENCOUNTER — NURSE TRIAGE (OUTPATIENT)
Dept: OTHER | Facility: OTHER | Age: 58
End: 2023-05-03

## 2023-05-03 ENCOUNTER — APPOINTMENT (OUTPATIENT)
Dept: LAB | Facility: HOSPITAL | Age: 58
End: 2023-05-03
Attending: INTERNAL MEDICINE

## 2023-05-03 DIAGNOSIS — E11.69 DIABETES MELLITUS TYPE 2 IN OBESE (HCC): ICD-10-CM

## 2023-05-03 DIAGNOSIS — R10.9 ABDOMINAL PAIN, UNSPECIFIED ABDOMINAL LOCATION: ICD-10-CM

## 2023-05-03 DIAGNOSIS — R11.2 NAUSEA AND VOMITING, UNSPECIFIED VOMITING TYPE: ICD-10-CM

## 2023-05-03 DIAGNOSIS — E55.9 VITAMIN D DEFICIENCY: ICD-10-CM

## 2023-05-03 DIAGNOSIS — J40 BRONCHITIS: ICD-10-CM

## 2023-05-03 DIAGNOSIS — R10.13 EPIGASTRIC PAIN: ICD-10-CM

## 2023-05-03 DIAGNOSIS — E66.9 DIABETES MELLITUS TYPE 2 IN OBESE (HCC): ICD-10-CM

## 2023-05-03 DIAGNOSIS — E03.9 ACQUIRED HYPOTHYROIDISM: ICD-10-CM

## 2023-05-03 LAB
ALBUMIN SERPL BCP-MCNC: 4.2 G/DL (ref 3.5–5)
ALP SERPL-CCNC: 70 U/L (ref 34–104)
ALT SERPL W P-5'-P-CCNC: 10 U/L (ref 7–52)
ANION GAP SERPL CALCULATED.3IONS-SCNC: 11 MMOL/L (ref 4–13)
AST SERPL W P-5'-P-CCNC: 10 U/L (ref 13–39)
BACTERIA UR QL AUTO: ABNORMAL /HPF
BASOPHILS # BLD AUTO: 0.03 THOUSANDS/ÂΜL (ref 0–0.1)
BASOPHILS NFR BLD AUTO: 0 % (ref 0–1)
BILIRUB DIRECT SERPL-MCNC: 0.19 MG/DL (ref 0–0.2)
BILIRUB SERPL-MCNC: 0.62 MG/DL (ref 0.2–1)
BILIRUB UR QL STRIP: ABNORMAL
BUN SERPL-MCNC: 9 MG/DL (ref 5–25)
CALCIUM SERPL-MCNC: 9.7 MG/DL (ref 8.4–10.2)
CHLORIDE SERPL-SCNC: 98 MMOL/L (ref 96–108)
CLARITY UR: CLEAR
CO2 SERPL-SCNC: 30 MMOL/L (ref 21–32)
COLOR UR: YELLOW
CREAT SERPL-MCNC: 0.64 MG/DL (ref 0.6–1.3)
CREAT UR-MCNC: 328 MG/DL
EOSINOPHIL # BLD AUTO: 0.07 THOUSAND/ÂΜL (ref 0–0.61)
EOSINOPHIL NFR BLD AUTO: 1 % (ref 0–6)
ERYTHROCYTE [DISTWIDTH] IN BLOOD BY AUTOMATED COUNT: 13.2 % (ref 11.6–15.1)
GFR SERPL CREATININE-BSD FRML MDRD: 100 ML/MIN/1.73SQ M
GLUCOSE P FAST SERPL-MCNC: 98 MG/DL (ref 65–99)
GLUCOSE UR STRIP-MCNC: NEGATIVE MG/DL
HCT VFR BLD AUTO: 37.7 % (ref 34.8–46.1)
HGB BLD-MCNC: 12.4 G/DL (ref 11.5–15.4)
HGB UR QL STRIP.AUTO: NEGATIVE
HYALINE CASTS #/AREA URNS LPF: ABNORMAL /LPF
IMM GRANULOCYTES # BLD AUTO: 0.04 THOUSAND/UL (ref 0–0.2)
IMM GRANULOCYTES NFR BLD AUTO: 0 % (ref 0–2)
KETONES UR STRIP-MCNC: ABNORMAL MG/DL
LEUKOCYTE ESTERASE UR QL STRIP: NEGATIVE
LIPASE SERPL-CCNC: 10 U/L (ref 11–82)
LYMPHOCYTES # BLD AUTO: 3.25 THOUSANDS/ÂΜL (ref 0.6–4.47)
LYMPHOCYTES NFR BLD AUTO: 27 % (ref 14–44)
MCH RBC QN AUTO: 30.5 PG (ref 26.8–34.3)
MCHC RBC AUTO-ENTMCNC: 32.9 G/DL (ref 31.4–37.4)
MCV RBC AUTO: 93 FL (ref 82–98)
MICROALBUMIN UR-MCNC: 28.1 MG/L (ref 0–20)
MICROALBUMIN/CREAT 24H UR: 9 MG/G CREATININE (ref 0–30)
MONOCYTES # BLD AUTO: 0.76 THOUSAND/ÂΜL (ref 0.17–1.22)
MONOCYTES NFR BLD AUTO: 6 % (ref 4–12)
NEUTROPHILS # BLD AUTO: 7.76 THOUSANDS/ÂΜL (ref 1.85–7.62)
NEUTS SEG NFR BLD AUTO: 66 % (ref 43–75)
NITRITE UR QL STRIP: NEGATIVE
NON-SQ EPI CELLS URNS QL MICRO: ABNORMAL /HPF
NRBC BLD AUTO-RTO: 0 /100 WBCS
PH UR STRIP.AUTO: 6 [PH]
PLATELET # BLD AUTO: 405 THOUSANDS/UL (ref 149–390)
PMV BLD AUTO: 9.6 FL (ref 8.9–12.7)
POTASSIUM SERPL-SCNC: 3.1 MMOL/L (ref 3.5–5.3)
PROT SERPL-MCNC: 7.2 G/DL (ref 6.4–8.4)
PROT UR STRIP-MCNC: ABNORMAL MG/DL
RBC # BLD AUTO: 4.07 MILLION/UL (ref 3.81–5.12)
RBC #/AREA URNS AUTO: ABNORMAL /HPF
SODIUM SERPL-SCNC: 139 MMOL/L (ref 135–147)
SP GR UR STRIP.AUTO: 1.02 (ref 1–1.03)
UROBILINOGEN UR QL STRIP.AUTO: 1 E.U./DL
WBC # BLD AUTO: 11.91 THOUSAND/UL (ref 4.31–10.16)
WBC #/AREA URNS AUTO: ABNORMAL /HPF

## 2023-05-03 RX ORDER — PROMETHAZINE HYDROCHLORIDE 12.5 MG/1
12.5 SUPPOSITORY RECTAL EVERY 6 HOURS PRN
Qty: 12 EACH | Refills: 0 | Status: SHIPPED | OUTPATIENT
Start: 2023-05-03

## 2023-05-03 RX ORDER — POLYETHYLENE GLYCOL 3350 17 G/17G
17 POWDER, FOR SOLUTION ORAL DAILY
Qty: 1530 G | Refills: 1 | Status: SHIPPED | OUTPATIENT
Start: 2023-05-03 | End: 2023-08-01

## 2023-05-03 RX ORDER — ONDANSETRON 4 MG/1
4 TABLET, FILM COATED ORAL EVERY 8 HOURS PRN
Qty: 20 TABLET | Refills: 0 | Status: SHIPPED | OUTPATIENT
Start: 2023-05-03

## 2023-05-03 RX ORDER — PANTOPRAZOLE SODIUM 40 MG/1
40 TABLET, DELAYED RELEASE ORAL 2 TIMES DAILY
Qty: 60 TABLET | Refills: 0 | Status: SHIPPED | OUTPATIENT
Start: 2023-05-03 | End: 2023-06-02

## 2023-05-03 NOTE — TELEPHONE ENCOUNTER
"Regarding: Pharmacy issue/ Worse abdminal pain  ----- Message from Kat Harrington sent at 5/3/2023  3:28 PM EDT -----  \" call yesterday  I continue with the same pain, nausea and I can not eat anything  They told me that they were going to send me a medicine for the nausea and I called the pharmacy but they only gave me two medicines and I still have the same pain   \"    "

## 2023-05-03 NOTE — TELEPHONE ENCOUNTER
Spoke to patient and reviewed blood work results with her  Lipase, LFTs are normal   WBC is slightly elevated  Potassium is low 3 1 and she was just prescribed potassium supplementation  She reports she was able to tolerate some [place] food today without vomiting  Had severe abdominal pain earlier this morning but this has improved but she continues with right upper quadrant/back pain and nausea  She only had a small liquid bowel movement this morning which was yellow  She just picked up pantoprazole and refill of Zofran, Phenergan from the pharmacy  Advised her to follow bland, low-fat diet and will obtain repeat CT scan with oral/IV contrast due to persistent pain and phone number given for central scheduling  MiraLAX also sent to the pharmacy to start daily due to constipation  She is aware of symptoms which would warrant ED visit    Has gastric emptying study 5/22, follow-up with Dr Ildefonso Giron 5/30

## 2023-05-03 NOTE — TELEPHONE ENCOUNTER
"  Reason for Disposition   Prescription prescribed recently is not at pharmacy and triager has access to patient's EMR and prescription is recorded in the EMR    Answer Assessment - Initial Assessment Questions  1  NAME of MEDICATION: \"What medicine are you calling about? \"      Unknown    2  QUESTION: Carlee Locus is your question? \" (e g , medication refill, side effect)      Patient reports that she was supposed to have 3 medications sent to the pharmacy for her yesterday but the pharmacy is telling her that there is nothing there for her  Reviewed medication list; medications were sent on incorrect class  Resent medications correctly and verified confirmation receipts by pharmacy  3  PRESCRIBING HCP: \"Who prescribed it? \" Reason: if prescribed by specialist, call should be referred to that group  Omayra Live    Informed patient that I have resent her prescriptions and she was appreciative  She also would like to speak to someone regarding her lab values from today      Protocols used: MEDICATION QUESTION CALL-ADULT-[place]    "

## 2023-05-05 LAB
ENDOMYSIUM IGA SER QL: NEGATIVE
GLIADIN PEPTIDE IGA SER-ACNC: 10 UNITS (ref 0–19)
GLIADIN PEPTIDE IGG SER-ACNC: 3 UNITS (ref 0–19)
IGA SERPL-MCNC: 296 MG/DL (ref 87–352)
TTG IGA SER-ACNC: <2 U/ML (ref 0–3)
TTG IGG SER-ACNC: <2 U/ML (ref 0–5)

## 2023-05-07 DIAGNOSIS — I10 PRIMARY HYPERTENSION: ICD-10-CM

## 2023-05-08 ENCOUNTER — VBI (OUTPATIENT)
Dept: ADMINISTRATIVE | Facility: OTHER | Age: 58
End: 2023-05-08

## 2023-05-08 RX ORDER — METOPROLOL TARTRATE 50 MG/1
TABLET, FILM COATED ORAL
Qty: 60 TABLET | Refills: 5 | Status: SHIPPED | OUTPATIENT
Start: 2023-05-08

## 2023-05-09 ENCOUNTER — HOSPITAL ENCOUNTER (OUTPATIENT)
Dept: CT IMAGING | Facility: HOSPITAL | Age: 58
Discharge: HOME | End: 2023-05-09

## 2023-05-09 DIAGNOSIS — R11.2 NAUSEA AND VOMITING, UNSPECIFIED VOMITING TYPE: ICD-10-CM

## 2023-05-09 DIAGNOSIS — R10.9 ABDOMINAL PAIN, UNSPECIFIED ABDOMINAL LOCATION: ICD-10-CM

## 2023-05-09 RX ADMIN — IOHEXOL 100 ML: 350 INJECTION, SOLUTION INTRAVENOUS at 15:22

## 2023-05-12 DIAGNOSIS — G43.009 MIGRAINE WITHOUT AURA AND WITHOUT STATUS MIGRAINOSUS, NOT INTRACTABLE: Primary | ICD-10-CM

## 2023-05-12 NOTE — TELEPHONE ENCOUNTER
Called pt and she stated needs refill on Sumatriptan 50 mg -- pt previously had but was d/c by a provider unknown to pt (she states)  Rx queued and sent to PCP at this time and current pharmacy on file is up to date

## 2023-05-15 RX ORDER — SUMATRIPTAN 50 MG/1
50 TABLET, FILM COATED ORAL ONCE AS NEEDED
Qty: 10 TABLET | Refills: 0 | Status: SHIPPED | OUTPATIENT
Start: 2023-05-15

## 2023-05-22 ENCOUNTER — HOSPITAL ENCOUNTER (OUTPATIENT)
Dept: RADIOLOGY | Facility: HOSPITAL | Age: 58
Discharge: HOME | End: 2023-05-22

## 2023-05-22 DIAGNOSIS — R11.2 NAUSEA AND VOMITING, UNSPECIFIED VOMITING TYPE: ICD-10-CM

## 2023-05-30 ENCOUNTER — OFFICE VISIT (OUTPATIENT)
Dept: GASTROENTEROLOGY | Facility: CLINIC | Age: 58
End: 2023-05-30

## 2023-05-30 VITALS
SYSTOLIC BLOOD PRESSURE: 115 MMHG | BODY MASS INDEX: 26.22 KG/M2 | WEIGHT: 148 LBS | HEIGHT: 63 IN | DIASTOLIC BLOOD PRESSURE: 86 MMHG | HEART RATE: 93 BPM

## 2023-05-30 DIAGNOSIS — K59.01 SLOW TRANSIT CONSTIPATION: ICD-10-CM

## 2023-05-30 DIAGNOSIS — K31.84 DIABETIC GASTROPARESIS ASSOCIATED WITH TYPE 2 DIABETES MELLITUS (HCC): Primary | ICD-10-CM

## 2023-05-30 DIAGNOSIS — E11.43 DIABETIC GASTROPARESIS ASSOCIATED WITH TYPE 2 DIABETES MELLITUS (HCC): Primary | ICD-10-CM

## 2023-05-30 DIAGNOSIS — K31.A11 INTESTINAL METAPLASIA OF ANTRUM OF STOMACH WITHOUT DYSPLASIA: ICD-10-CM

## 2023-05-30 RX ORDER — METOCLOPRAMIDE 10 MG/1
10 TABLET ORAL
Qty: 60 TABLET | Refills: 1 | Status: SHIPPED | OUTPATIENT
Start: 2023-05-30

## 2023-05-30 NOTE — PROGRESS NOTES
[facility] - Outpatient Follow-up Note  Zehra Ramírez 56 y o  female MRN: [id number]  Encounter: [id number]          ASSESSMENT AND PLAN:      1  Diabetic gastroparesis associated with type 2 diabetes mellitus ([address] )  Gastric emptying study result was reviewed with the patient which shows severe gastroparesis, after 4-hour, 70% of the food still retained in the stomach  She was taking Ozempic which we discontinued  Advised for gastroparesis diet which is low fiber, low-fat in the diet, start Reglan 10 mg twice a day, continue with Protonix 40 mg p o  every morning, sucralfate 1 g 4 times a day  Long discussion with the patient about tight glycemic control, endoscopy biopsy result was reviewed with the patient  - metoclopramide (Reglan) 10 mg tablet; Take 1 tablet (10 mg total) by mouth 2 (two) times a day before lunch and dinner  Dispense: 60 tablet; Refill: 1    2  Intestinal metaplasia of antrum of stomach without dysplasia  EGD with biopsy result was reviewed with patient which shows chronic focal intestinal metaplasia, without dysplasia, will recommended repeat EGD in 1 year because of suboptimal visualization of stomach    3  Slow transit constipation  S/p colonoscopy with polypectomy, polyp biopsy result shows evidence of tubular adenoma, random colon biopsies negative  Patient will come back for repeat colonoscopy in 5 years, continue with MiraLAX 17 g p o  nightly    ______________________________________________________________________    SUBJECTIVE: Patient seen and examined, she come for follow-up  She was referred to us for severe abdominal pain, nausea, reflux symptoms, EGD and colonoscopy was done  EGD shows retained food in the stomach with evidence of gastritis, subsequently gastric emptying study was done which came back abnormal, there was evidence of severe gastroparesis  Colonoscopy shows 1 colon polyp removed otherwise normal colonic mucosa    CAT scan of abdomen and pelvis was done which came back normal   EGD with biopsy result was reviewed with patient which shows evidence of chronic intestinal metaplasia without dysplasia  No evidence of H  pylori infection, polyp colon polyp is tubular adenoma  She was on Ozempic which we discontinued because of severely abnormal gastric emptying study  Today she is here for follow-up, overall she is doing well on Protonix, Carafate, she denying any further episode of nausea and vomiting  MiraLAX is helping with the bowel movement      REVIEW OF SYSTEMS IS OTHERWISE NEGATIVE  Historical Information   Past Medical History:   Diagnosis Date   • Addiction to drug [facility])    • Adjustment disorder    • Alcohol abuse    • Alcoholism ([place] [id number] )    • Anxiety    • Bipolar disorder ([place] [address] )    • Bowel obstruction ([place] [address] )    • Depression    • Diabetes type 2, controlled ([place] [address] )    • Disease of thyroid gland    • Gastritis    • Head injury    • Heart palpitations    • Hyperlipidemia    • Hypertension    • Hypothyroidism    • Psychiatric illness    • PTSD (post-traumatic stress disorder)    • Seizure ([place] [address] )    • Seizures ([place] [address] )    • Sleep difficulties    • Substance abuse ([place] [address] )    • Suicide attempt ([place] [address] )    • Withdrawal symptoms, drug or narcotic ([place] [address] )      Past Surgical History:   Procedure Laterality Date   • ABDOMINAL SURGERY     • APPENDECTOMY     • BOWEL RESECTION     • CHOLECYSTECTOMY     • ESOPHAGOGASTRODUODENOSCOPY N/A 05/18/2018    Procedure: ESOPHAGOGASTRODUODENOSCOPY (EGD) with bx;  Surgeon: Ирина Chan DO;  Location: [facility];   Service: Gastroenterology   • HYSTERECTOMY     • KNEE SURGERY Bilateral 1991     Social History   Social History     Substance and Sexual Activity   Alcohol Use Not Currently   • Alcohol/week: 6 0 standard drinks of alcohol   • Types: 6 Cans of beer per week    Comment: last drink on 08/15/20     Social History     Substance and Sexual Activity   Drug Use Never     Social History     Tobacco Use   Smoking "Status Every Day   • Packs/day: 0 50   • Years: 25 00   • Total pack years: 12 50   • Types: Cigarettes   • Passive exposure: Current   Smokeless Tobacco Never   Tobacco Comments    smokes like 5 a day(06/24/2022)     Family History   Problem Relation Age of Onset   • Bipolar disorder Mother    • Cancer Father    • Diabetes Father        Meds/Allergies       Current Outpatient Medications:   •  Alcohol Swabs 70 % PADS  •  ARIPiprazole (ABILIFY) 10 mg tablet  •  atorvastatin (LIPITOR) 20 mg tablet  •  Blood Glucose Monitoring Suppl (OneTouch Verio Reflect) w/Device KIT  •  clonazePAM (KlonoPIN) 0 5 mg tablet  •  docusate sodium (COLACE) 100 mg capsule  •  ergocalciferol (VITAMIN D2) 50,000 units  •  escitalopram (LEXAPRO) 10 mg tablet  •  gabapentin (NEURONTIN) 800 mg tablet  •  hydrochlorothiazide (HYDRODIURIL) 12 5 mg tablet  •  insulin glargine (LANTUS) 100 units/mL subcutaneous injection  •  levothyroxine 25 mcg tablet  •  metFORMIN (GLUCOPHAGE) 1000 MG tablet  •  metoclopramide (Reglan) 10 mg tablet  •  metoprolol tartrate (LOPRESSOR) 50 mg tablet  •  Microlet Lancets MISC  •  ondansetron (ZOFRAN) 4 mg tablet  •  OneTouch Delica Lancets 34Q MISC  •  pantoprazole (PROTONIX) 40 mg tablet  •  polyethylene glycol (MIRALAX) 17 g packet  •  promethazine (PHENERGAN) 12 5 mg suppository  •  semaglutide, 1 mg/dose, (Ozempic) 4 mg/3 mL injection pen  •  sucralfate (CARAFATE) 1 g tablet  •  SUMAtriptan (IMITREX) 50 mg tablet  •  traZODone (DESYREL) 100 mg tablet  •  docusate sodium (COLACE) 100 mg capsule    Allergies   Allergen Reactions   • Losartan Cough   • Latex Rash           Objective     Blood pressure 115/86, pulse 93, height 5' 3\" (1 6 m), weight 68 5 kg (151 lb), not currently breastfeeding  Body mass index is 26 75 kg/m²        PHYSICAL EXAM:      General Appearance:   Alert, cooperative, no distress   HEENT:   Normocephalic, atraumatic, anicteric      Neck:  Supple, symmetrical, trachea midline   Lungs:   Clear " to auscultation bilaterally; no rales, rhonchi or wheezing; respirations unlabored    Heart[de-identified]   Regular rate and rhythm; no murmur, rub, or gallop  Abdomen:   Soft, non-tender, non-distended; normal bowel sounds; no masses, no organomegaly    Genitalia:   Deferred    Rectal:   Deferred    Extremities:  No cyanosis, clubbing or edema    Pulses:  2+ and symmetric    Skin:  No jaundice, rashes, or lesions    Lymph nodes:  No palpable cervical lymphadenopathy        Lab Results:   No visits with results within 1 Day(s) from this visit     Latest known visit with results is:   Appointment on 05/03/2023   Component Date Value   • Lipase 05/03/2023 10 (L)    • Total Bilirubin 05/03/2023 0 62    • Bilirubin, Direct 05/03/2023 0 19    • Alkaline Phosphatase 05/03/2023 70    • AST 05/03/2023 10 (L)    • ALT 05/03/2023 10    • Total Protein 05/03/2023 7 2    • Albumin 05/03/2023 4 2    • Sodium 05/03/2023 139    • Potassium 05/03/2023 3 1 (L)    • Chloride 05/03/2023 98    • CO2 05/03/2023 30    • ANION GAP 05/03/2023 11    • BUN 05/03/2023 9    • Creatinine 05/03/2023 0 64    • Glucose, Fasting 05/03/2023 98    • Calcium 05/03/2023 9 7    • eGFR 05/03/2023 100    • WBC 05/03/2023 11 91 (H)    • RBC 05/03/2023 4 07    • Hemoglobin 05/03/2023 12 4    • Hematocrit 05/03/2023 37 7    • MCV 05/03/2023 93    • MCH 05/03/2023 30 5    • MCHC 05/03/2023 32 9    • RDW 05/03/2023 13 2    • MPV 05/03/2023 9 6    • Platelets 28/13/7551 405 (H)    • nRBC 05/03/2023 0    • Neutrophils Relative 05/03/2023 66    • Immat GRANS % 05/03/2023 0    • Lymphocytes Relative 05/03/2023 27    • Monocytes Relative 05/03/2023 6    • Eosinophils Relative 05/03/2023 1    • Basophils Relative 05/03/2023 0    • Neutrophils Absolute 05/03/2023 7 76 (H)    • Immature Grans Absolute 05/03/2023 0 04    • Lymphocytes Absolute 05/03/2023 3 25    • Monocytes Absolute 05/03/2023 0 76    • Eosinophils Absolute 05/03/2023 0 07    • Basophils Absolute 05/03/2023 0 03 • IgA 05/03/2023 296    • Gliadin IgA 05/03/2023 10    • Gliadin IgG 05/03/2023 3    • Tissue Transglut Ab IGG 05/03/2023 <2    • TISSUE TRANSGLUTAMINASE * 05/03/2023 <2    • Endomysial IgA 05/03/2023 Negative          Radiology Results:   NM gastric emptying    Result Date: 5/22/2023  Narrative: GASTRIC EMPTYING STUDY INDICATION: R11 2: Nausea with vomiting, unspecified COMPARISON:  None available TECHNIQUE:   The study was performed following the oral administration of 1 1 mCi Tc-99m sulfur colloid combined with scrambled eggs, as part of a standard meal   Following the meal, one minute anterior and posterior images were obtained immediately and at 0 25 hours, 0 5 hour, 1 hour, 1 5 hour, 2 hour, 3 hour and 4 hour intervals from the time of ingestion  Geometric mean analyses were then performed  FINDINGS: Gastric emptying at 0 5 hours = 17% (N < 30%) Gastric emptying at 1 hour = 18% (N = 10 - 70%) Gastric emptying at 2 hours = 18% (N = > 40%) Gastric emptying at 3 hours = 22% (N = > 70%) Gastric emptying at 4 hours = 29% (N = >90%) Linear T-1/2 = 557 minutes     Impression: Severely delayed rate of gastric emptying  Workstation performed: [id number]     CT abdomen pelvis w contrast    Result Date: 5/15/2023  Narrative: CT ABDOMEN AND PELVIS WITH IV CONTRAST INDICATION:   R11 2: Nausea with vomiting, unspecified R10 9: Unspecified abdominal pain  COMPARISON: CT abdomen pelvis dated March 17, 2023  TECHNIQUE:  CT examination of the abdomen and pelvis was performed  Multiplanar 2D reformatted images were created from the source data  This examination, like all CT scans performed in the [facility], was performed utilizing techniques to minimize radiation dose exposure, including the use of iterative reconstruction and automated exposure control   Radiation dose length product (DLP) for this visit:  429 8 mGy-cm IV Contrast:  100 mL of iohexol (OMNIPAQUE) Enteric Contrast:  Enteric contrast was not administered  FINDINGS: ABDOMEN LOWER CHEST:  No clinically significant abnormality identified in the visualized lower chest  LIVER/BILIARY TREE:  One or more subcentimeter  low-density hepatic lesion(s) are noted, too small to accurately characterize, but statistically most likely to represent subcentimeter hepatic cysts or hemangiomas  No suspicious solid hepatic lesion is identified  Hepatic contours are normal   No biliary dilatation  There is mild hepatic steatosis  GALLBLADDER:  Gallbladder is surgically absent  SPLEEN:  Unremarkable  PANCREAS:  Unremarkable  ADRENAL GLANDS:  Unremarkable  KIDNEYS/URETERS:  Unremarkable  No hydronephrosis  STOMACH AND BOWEL: There is no evidence of large or small bowel obstruction  There are a few loops of nondilated fluid-filled small bowel as well as a small amount of liquid stool within the right colon  APPENDIX: There are expected postoperative changes of appendectomy  ABDOMINOPELVIC CAVITY:  No ascites  No pneumoperitoneum  No lymphadenopathy  VESSELS:  Unremarkable for patient's age  PELVIS REPRODUCTIVE ORGANS:  Surgical changes of prior hysterectomy  URINARY BLADDER:  Unremarkable  ABDOMINAL WALL/INGUINAL REGIONS:  Unremarkable  OSSEOUS STRUCTURES:  No acute fracture or destructive osseous lesion  Impression: No acute intra-abdominal normality  No free air or free fluid  No evidence of large or small bowel obstruction  A few loops of nondilated fluid-filled small bowel are noted at the lower abdomen and pelvis with a small amount of liquid stool within the right colon  Clinical correlation is recommended   Workstation performed: [id number]

## 2023-06-12 DIAGNOSIS — K29.70 GASTRITIS WITHOUT BLEEDING, UNSPECIFIED CHRONICITY, UNSPECIFIED GASTRITIS TYPE: ICD-10-CM

## 2023-06-12 RX ORDER — SUCRALFATE 1 G/1
TABLET ORAL
Qty: 120 TABLET | Refills: 1 | Status: SHIPPED | OUTPATIENT
Start: 2023-06-12

## 2023-06-15 ENCOUNTER — VBI (OUTPATIENT)
Dept: ADMINISTRATIVE | Facility: OTHER | Age: 58
End: 2023-06-15

## 2023-06-16 DIAGNOSIS — F43.10 PTSD (POST-TRAUMATIC STRESS DISORDER): ICD-10-CM

## 2023-06-16 RX ORDER — TRAZODONE HYDROCHLORIDE 100 MG/1
200 TABLET ORAL
Qty: 60 TABLET | Refills: 2 | Status: SHIPPED | OUTPATIENT
Start: 2023-06-16

## 2023-06-16 NOTE — TELEPHONE ENCOUNTER
Received an Rx fax sheet from the [facility] pharmacy stating patient needs a refill on Tazadone   Sending to provider for approval or denial

## 2023-06-19 DIAGNOSIS — J40 BRONCHITIS: ICD-10-CM

## 2023-06-19 RX ORDER — PANTOPRAZOLE SODIUM 40 MG/1
TABLET, DELAYED RELEASE ORAL
Qty: 60 TABLET | Refills: 0 | Status: SHIPPED | OUTPATIENT
Start: 2023-06-19

## 2023-07-02 DIAGNOSIS — E03.9 HYPOTHYROIDISM: ICD-10-CM

## 2023-07-03 RX ORDER — LEVOTHYROXINE SODIUM 0.03 MG/1
TABLET ORAL
Qty: 90 TABLET | Refills: 3 | Status: SHIPPED | OUTPATIENT
Start: 2023-07-03

## 2023-07-03 NOTE — NURSING NOTE
06/05/2023 06/05/2023   1  Hydrocodone-Acetamin 5-325 Mg 30.00  30  Ju Gol  [id number]   [id number]  0/0  5.00 [place]    05/16/2023 05/16/2023   1  Pregabalin 25 Mg Capsule 90.00  37  Ra Ban  [id number]   [facility] ([id number][facility]  0/3  0.41 [place]    05/03/2023 05/03/2023   1  Hydrocodone-Acetamin 5-325 Mg 30.00  30  Ju Gol  [id number]   [facility] ([id number][facility]  0/0  5.00 [place]    04/27/2023 01/06/2023   1  Clonazepam 0.5 Mg Tablet 30.00  30  Ju Whe  [id number]   [facility] ([id number][facility]  2/3  1.00 [place]    04/12/2023 04/12/2023   1  Tramadol Hcl 50 Mg Tablet 90.00  15  Sh Haa  [id number]   [facility] ([id number]  0/0  30.00 [place]    04/01/2023 04/01/2023   1  Hydrocodone-Acetamin 5-325 Mg 30.00  30  Ta [facility]  [id number]   [facility] ([id number])          PDMP Review       Value Time User    State PDMP site checked  Yes 7/3/2023  2:32 PM Denton Barahona MD           Patient was visible in the milieu with minimal peer interaction  VSS  Rate anxiety 2/4, denies all other psych but c/o headache 8/10, tylenol 975 mg given at 2048 and it was effective  No behaviors noted  Had 75% for dinner  Compliant with all her medications  Safety checks ongoing

## 2023-07-12 DIAGNOSIS — F31.4 BIPOLAR DISORDER, CURRENT EPISODE DEPRESSED, SEVERE, WITHOUT PSYCHOTIC FEATURES (HCC): ICD-10-CM

## 2023-07-12 RX ORDER — CLONAZEPAM 0.5 MG/1
0.5 TABLET ORAL 2 TIMES DAILY PRN
Qty: 60 TABLET | Refills: 0 | Status: SHIPPED | OUTPATIENT
Start: 2023-07-12

## 2023-07-12 NOTE — TELEPHONE ENCOUNTER
Patient emergency contact requesting refill on patients clonazepam 0.5 mg TAB.  Medication pended for provider approval.

## 2023-07-17 ENCOUNTER — VBI (OUTPATIENT)
Dept: ADMINISTRATIVE | Facility: OTHER | Age: 58
End: 2023-07-17

## 2023-07-25 ENCOUNTER — TELEPHONE (OUTPATIENT)
Dept: CARDIOLOGY CLINIC | Facility: CLINIC | Age: 58
End: 2023-07-25

## 2023-07-26 DIAGNOSIS — J40 BRONCHITIS: ICD-10-CM

## 2023-07-26 RX ORDER — PANTOPRAZOLE SODIUM 40 MG/1
TABLET, DELAYED RELEASE ORAL
Qty: 180 TABLET | Refills: 0 | Status: SHIPPED | OUTPATIENT
Start: 2023-07-26

## 2023-07-27 ENCOUNTER — VBI (OUTPATIENT)
Dept: ADMINISTRATIVE | Facility: OTHER | Age: 58
End: 2023-07-27

## 2023-08-06 DIAGNOSIS — K29.70 GASTRITIS WITHOUT BLEEDING, UNSPECIFIED CHRONICITY, UNSPECIFIED GASTRITIS TYPE: ICD-10-CM

## 2023-08-07 RX ORDER — SUCRALFATE 1 G/1
TABLET ORAL
Qty: 120 TABLET | Refills: 1 | Status: SHIPPED | OUTPATIENT
Start: 2023-08-07

## 2023-08-16 DIAGNOSIS — R10.9 ABDOMINAL PAIN: ICD-10-CM

## 2023-08-22 RX ORDER — DOCUSATE SODIUM 100 MG/1
100 CAPSULE, LIQUID FILLED ORAL 2 TIMES DAILY
Qty: 60 CAPSULE | Refills: 0 | Status: ON HOLD | OUTPATIENT
Start: 2023-08-22

## 2023-09-02 ENCOUNTER — HOSPITAL ENCOUNTER (INPATIENT)
Facility: HOSPITAL | Age: 58
LOS: 2 days | Discharge: HOME | DRG: 074 | End: 2023-09-06
Attending: EMERGENCY MEDICINE | Admitting: INTERNAL MEDICINE
Payer: COMMERCIAL

## 2023-09-02 DIAGNOSIS — R10.9 ABDOMINAL PAIN: ICD-10-CM

## 2023-09-02 DIAGNOSIS — R29.90 STROKE-LIKE SYMPTOMS: Primary | ICD-10-CM

## 2023-09-02 DIAGNOSIS — R47.1 DYSARTHRIA: ICD-10-CM

## 2023-09-02 DIAGNOSIS — R10.9 ABDOMINAL PAIN, UNSPECIFIED ABDOMINAL LOCATION: ICD-10-CM

## 2023-09-02 LAB — GLUCOSE SERPL-MCNC: 164 MG/DL (ref 65–140)

## 2023-09-02 PROCEDURE — 82948 REAGENT STRIP/BLOOD GLUCOSE: CPT

## 2023-09-02 PROCEDURE — 99284 EMERGENCY DEPT VISIT MOD MDM: CPT

## 2023-09-03 ENCOUNTER — APPOINTMENT (OUTPATIENT)
Dept: MRI IMAGING | Facility: HOSPITAL | Age: 58
DRG: 074 | End: 2023-09-03
Payer: COMMERCIAL

## 2023-09-03 ENCOUNTER — APPOINTMENT (EMERGENCY)
Dept: CT IMAGING | Facility: HOSPITAL | Age: 58
DRG: 074 | End: 2023-09-03
Payer: COMMERCIAL

## 2023-09-03 PROBLEM — R47.1 DYSARTHRIA: Status: ACTIVE | Noted: 2023-09-03

## 2023-09-03 LAB
ALBUMIN SERPL BCP-MCNC: 4.8 G/DL (ref 3.5–5)
ALP SERPL-CCNC: 92 U/L (ref 34–104)
ALT SERPL W P-5'-P-CCNC: 14 U/L (ref 7–52)
AMPHETAMINES SERPL QL SCN: NEGATIVE
ANION GAP SERPL CALCULATED.3IONS-SCNC: 6 MMOL/L
ANION GAP SERPL CALCULATED.3IONS-SCNC: 8 MMOL/L
APTT PPP: 29 SECONDS (ref 23–37)
AST SERPL W P-5'-P-CCNC: 14 U/L (ref 13–39)
B BURGDOR IGG+IGM SER QL IA: NEGATIVE
BARBITURATES UR QL: NEGATIVE
BASOPHILS # BLD AUTO: 0.04 THOUSANDS/ÂΜL (ref 0–0.1)
BASOPHILS NFR BLD AUTO: 0 % (ref 0–1)
BENZODIAZ UR QL: NEGATIVE
BILIRUB SERPL-MCNC: 0.34 MG/DL (ref 0.2–1)
BILIRUB UR QL STRIP: NEGATIVE
BUN SERPL-MCNC: 9 MG/DL (ref 5–25)
BUN SERPL-MCNC: 9 MG/DL (ref 5–25)
CALCIUM SERPL-MCNC: 10 MG/DL (ref 8.4–10.2)
CALCIUM SERPL-MCNC: 8.7 MG/DL (ref 8.4–10.2)
CARDIAC TROPONIN I PNL SERPL HS: 4 NG/L
CHLORIDE SERPL-SCNC: 100 MMOL/L (ref 96–108)
CHLORIDE SERPL-SCNC: 105 MMOL/L (ref 96–108)
CHOLEST SERPL-MCNC: 158 MG/DL
CLARITY UR: CLEAR
CO2 SERPL-SCNC: 28 MMOL/L (ref 21–32)
CO2 SERPL-SCNC: 31 MMOL/L (ref 21–32)
COCAINE UR QL: NEGATIVE
COLOR UR: YELLOW
CREAT SERPL-MCNC: 0.68 MG/DL (ref 0.6–1.3)
CREAT SERPL-MCNC: 0.79 MG/DL (ref 0.6–1.3)
EOSINOPHIL # BLD AUTO: 0.16 THOUSAND/ÂΜL (ref 0–0.61)
EOSINOPHIL NFR BLD AUTO: 2 % (ref 0–6)
ERYTHROCYTE [DISTWIDTH] IN BLOOD BY AUTOMATED COUNT: 12.6 % (ref 11.6–15.1)
ETHANOL SERPL-MCNC: <10 MG/DL
GFR SERPL CREATININE-BSD FRML MDRD: 84 ML/MIN/1.73SQ M
GFR SERPL CREATININE-BSD FRML MDRD: 98 ML/MIN/1.73SQ M
GLUCOSE SERPL-MCNC: 100 MG/DL (ref 65–140)
GLUCOSE SERPL-MCNC: 101 MG/DL (ref 65–140)
GLUCOSE SERPL-MCNC: 113 MG/DL (ref 65–140)
GLUCOSE SERPL-MCNC: 114 MG/DL (ref 65–140)
GLUCOSE SERPL-MCNC: 135 MG/DL (ref 65–140)
GLUCOSE SERPL-MCNC: 94 MG/DL (ref 65–140)
GLUCOSE UR STRIP-MCNC: NEGATIVE MG/DL
HCT VFR BLD AUTO: 40.6 % (ref 34.8–46.1)
HDLC SERPL-MCNC: 41 MG/DL
HGB BLD-MCNC: 13.1 G/DL (ref 11.5–15.4)
HGB UR QL STRIP.AUTO: NEGATIVE
IMM GRANULOCYTES # BLD AUTO: 0.03 THOUSAND/UL (ref 0–0.2)
IMM GRANULOCYTES NFR BLD AUTO: 0 % (ref 0–2)
INR PPP: 0.8 (ref 0.84–1.19)
KETONES UR STRIP-MCNC: NEGATIVE MG/DL
LDLC SERPL CALC-MCNC: 105 MG/DL (ref 0–100)
LEUKOCYTE ESTERASE UR QL STRIP: NEGATIVE
LIPASE SERPL-CCNC: 38 U/L (ref 11–82)
LYMPHOCYTES # BLD AUTO: 3.71 THOUSANDS/ÂΜL (ref 0.6–4.47)
LYMPHOCYTES NFR BLD AUTO: 36 % (ref 14–44)
MCH RBC QN AUTO: 29.2 PG (ref 26.8–34.3)
MCHC RBC AUTO-ENTMCNC: 32.3 G/DL (ref 31.4–37.4)
MCV RBC AUTO: 91 FL (ref 82–98)
MONOCYTES # BLD AUTO: 0.88 THOUSAND/ÂΜL (ref 0.17–1.22)
MONOCYTES NFR BLD AUTO: 8 % (ref 4–12)
NEUTROPHILS # BLD AUTO: 5.63 THOUSANDS/ÂΜL (ref 1.85–7.62)
NEUTS SEG NFR BLD AUTO: 54 % (ref 43–75)
NITRITE UR QL STRIP: NEGATIVE
NRBC BLD AUTO-RTO: 0 /100 WBCS
OPIATES UR QL SCN: NEGATIVE
OXYCODONE+OXYMORPHONE UR QL SCN: NEGATIVE
PCP UR QL: NEGATIVE
PH UR STRIP.AUTO: 6.5 [PH]
PLATELET # BLD AUTO: 381 THOUSANDS/UL (ref 149–390)
PMV BLD AUTO: 9.6 FL (ref 8.9–12.7)
POTASSIUM SERPL-SCNC: 3.4 MMOL/L (ref 3.5–5.3)
POTASSIUM SERPL-SCNC: 4.1 MMOL/L (ref 3.5–5.3)
PROT SERPL-MCNC: 8.1 G/DL (ref 6.4–8.4)
PROT UR STRIP-MCNC: NEGATIVE MG/DL
PROTHROMBIN TIME: 11.3 SECONDS (ref 11.6–14.5)
RBC # BLD AUTO: 4.48 MILLION/UL (ref 3.81–5.12)
SODIUM SERPL-SCNC: 139 MMOL/L (ref 135–147)
SODIUM SERPL-SCNC: 139 MMOL/L (ref 135–147)
SP GR UR STRIP.AUTO: <=1.005 (ref 1–1.03)
THC UR QL: NEGATIVE
TRIGL SERPL-MCNC: 62 MG/DL
UROBILINOGEN UR QL STRIP.AUTO: 0.2 E.U./DL
WBC # BLD AUTO: 10.45 THOUSAND/UL (ref 4.31–10.16)

## 2023-09-03 PROCEDURE — 99285 EMERGENCY DEPT VISIT HI MDM: CPT | Performed by: EMERGENCY MEDICINE

## 2023-09-03 PROCEDURE — 83690 ASSAY OF LIPASE: CPT | Performed by: EMERGENCY MEDICINE

## 2023-09-03 PROCEDURE — 83036 HEMOGLOBIN GLYCOSYLATED A1C: CPT | Performed by: FAMILY MEDICINE

## 2023-09-03 PROCEDURE — 86618 LYME DISEASE ANTIBODY: CPT | Performed by: FAMILY MEDICINE

## 2023-09-03 PROCEDURE — G1004 CDSM NDSC: HCPCS

## 2023-09-03 PROCEDURE — 74177 CT ABD & PELVIS W/CONTRAST: CPT

## 2023-09-03 PROCEDURE — 80048 BASIC METABOLIC PNL TOTAL CA: CPT | Performed by: FAMILY MEDICINE

## 2023-09-03 PROCEDURE — 84484 ASSAY OF TROPONIN QUANT: CPT | Performed by: EMERGENCY MEDICINE

## 2023-09-03 PROCEDURE — 85025 COMPLETE CBC W/AUTO DIFF WBC: CPT | Performed by: EMERGENCY MEDICINE

## 2023-09-03 PROCEDURE — 80307 DRUG TEST PRSMV CHEM ANLYZR: CPT | Performed by: EMERGENCY MEDICINE

## 2023-09-03 PROCEDURE — 99204 OFFICE O/P NEW MOD 45 MIN: CPT | Performed by: INTERNAL MEDICINE

## 2023-09-03 PROCEDURE — 82948 REAGENT STRIP/BLOOD GLUCOSE: CPT

## 2023-09-03 PROCEDURE — 93005 ELECTROCARDIOGRAM TRACING: CPT

## 2023-09-03 PROCEDURE — 81003 URINALYSIS AUTO W/O SCOPE: CPT | Performed by: EMERGENCY MEDICINE

## 2023-09-03 PROCEDURE — 96374 THER/PROPH/DIAG INJ IV PUSH: CPT

## 2023-09-03 PROCEDURE — 70496 CT ANGIOGRAPHY HEAD: CPT

## 2023-09-03 PROCEDURE — 36415 COLL VENOUS BLD VENIPUNCTURE: CPT | Performed by: EMERGENCY MEDICINE

## 2023-09-03 PROCEDURE — 85730 THROMBOPLASTIN TIME PARTIAL: CPT | Performed by: EMERGENCY MEDICINE

## 2023-09-03 PROCEDURE — 80061 LIPID PANEL: CPT | Performed by: FAMILY MEDICINE

## 2023-09-03 PROCEDURE — 96375 TX/PRO/DX INJ NEW DRUG ADDON: CPT

## 2023-09-03 PROCEDURE — 99223 1ST HOSP IP/OBS HIGH 75: CPT | Performed by: FAMILY MEDICINE

## 2023-09-03 PROCEDURE — 70551 MRI BRAIN STEM W/O DYE: CPT

## 2023-09-03 PROCEDURE — 80053 COMPREHEN METABOLIC PANEL: CPT | Performed by: EMERGENCY MEDICINE

## 2023-09-03 PROCEDURE — 70498 CT ANGIOGRAPHY NECK: CPT

## 2023-09-03 PROCEDURE — 85610 PROTHROMBIN TIME: CPT | Performed by: EMERGENCY MEDICINE

## 2023-09-03 PROCEDURE — 82077 ASSAY SPEC XCP UR&BREATH IA: CPT | Performed by: EMERGENCY MEDICINE

## 2023-09-03 RX ORDER — CLONAZEPAM 0.5 MG/1
0.5 TABLET ORAL 2 TIMES DAILY PRN
Status: DISCONTINUED | OUTPATIENT
Start: 2023-09-03 | End: 2023-09-06 | Stop reason: HOSPADM

## 2023-09-03 RX ORDER — NICOTINE 21 MG/24HR
1 PATCH, TRANSDERMAL 24 HOURS TRANSDERMAL DAILY
Status: DISCONTINUED | OUTPATIENT
Start: 2023-09-03 | End: 2023-09-06 | Stop reason: HOSPADM

## 2023-09-03 RX ORDER — INSULIN LISPRO 100 [IU]/ML
1-5 INJECTION, SOLUTION INTRAVENOUS; SUBCUTANEOUS
Status: DISCONTINUED | OUTPATIENT
Start: 2023-09-03 | End: 2023-09-06 | Stop reason: HOSPADM

## 2023-09-03 RX ORDER — SUCRALFATE 1 G/1
1 TABLET ORAL
Status: DISCONTINUED | OUTPATIENT
Start: 2023-09-03 | End: 2023-09-06 | Stop reason: HOSPADM

## 2023-09-03 RX ORDER — METOCLOPRAMIDE 10 MG/1
10 TABLET ORAL
Status: DISCONTINUED | OUTPATIENT
Start: 2023-09-03 | End: 2023-09-06 | Stop reason: HOSPADM

## 2023-09-03 RX ORDER — KETOROLAC TROMETHAMINE 30 MG/ML
15 INJECTION, SOLUTION INTRAMUSCULAR; INTRAVENOUS EVERY 6 HOURS PRN
Status: DISCONTINUED | OUTPATIENT
Start: 2023-09-03 | End: 2023-09-04

## 2023-09-03 RX ORDER — GABAPENTIN 400 MG/1
800 CAPSULE ORAL 3 TIMES DAILY
Status: DISCONTINUED | OUTPATIENT
Start: 2023-09-03 | End: 2023-09-06 | Stop reason: HOSPADM

## 2023-09-03 RX ORDER — INSULIN GLARGINE 100 [IU]/ML
25 INJECTION, SOLUTION SUBCUTANEOUS
Status: DISCONTINUED | OUTPATIENT
Start: 2023-09-03 | End: 2023-09-06 | Stop reason: HOSPADM

## 2023-09-03 RX ORDER — ONDANSETRON 2 MG/ML
4 INJECTION INTRAMUSCULAR; INTRAVENOUS EVERY 6 HOURS PRN
Status: DISCONTINUED | OUTPATIENT
Start: 2023-09-03 | End: 2023-09-06 | Stop reason: HOSPADM

## 2023-09-03 RX ORDER — ACETAMINOPHEN 325 MG/1
650 TABLET ORAL EVERY 4 HOURS PRN
Status: DISCONTINUED | OUTPATIENT
Start: 2023-09-03 | End: 2023-09-05

## 2023-09-03 RX ORDER — TRAZODONE HYDROCHLORIDE 100 MG/1
200 TABLET ORAL
Status: DISCONTINUED | OUTPATIENT
Start: 2023-09-03 | End: 2023-09-06 | Stop reason: HOSPADM

## 2023-09-03 RX ORDER — LACTULOSE 20 G/30ML
20 SOLUTION ORAL 3 TIMES DAILY
Status: DISCONTINUED | OUTPATIENT
Start: 2023-09-03 | End: 2023-09-06 | Stop reason: HOSPADM

## 2023-09-03 RX ORDER — ASPIRIN 81 MG/1
81 TABLET, CHEWABLE ORAL DAILY
Status: DISCONTINUED | OUTPATIENT
Start: 2023-09-03 | End: 2023-09-06 | Stop reason: HOSPADM

## 2023-09-03 RX ORDER — POLYETHYLENE GLYCOL 3350 17 G/17G
17 POWDER, FOR SOLUTION ORAL 2 TIMES DAILY
Status: DISCONTINUED | OUTPATIENT
Start: 2023-09-03 | End: 2023-09-06 | Stop reason: HOSPADM

## 2023-09-03 RX ORDER — ARIPIPRAZOLE 5 MG/1
10 TABLET ORAL DAILY
Status: DISCONTINUED | OUTPATIENT
Start: 2023-09-03 | End: 2023-09-06 | Stop reason: HOSPADM

## 2023-09-03 RX ORDER — MORPHINE SULFATE 4 MG/ML
4 INJECTION, SOLUTION INTRAMUSCULAR; INTRAVENOUS ONCE
Status: COMPLETED | OUTPATIENT
Start: 2023-09-03 | End: 2023-09-03

## 2023-09-03 RX ORDER — ATORVASTATIN CALCIUM 40 MG/1
40 TABLET, FILM COATED ORAL EVERY EVENING
Status: DISCONTINUED | OUTPATIENT
Start: 2023-09-03 | End: 2023-09-06 | Stop reason: HOSPADM

## 2023-09-03 RX ORDER — PANTOPRAZOLE SODIUM 40 MG/1
40 TABLET, DELAYED RELEASE ORAL 2 TIMES DAILY
Status: DISCONTINUED | OUTPATIENT
Start: 2023-09-03 | End: 2023-09-06 | Stop reason: HOSPADM

## 2023-09-03 RX ORDER — ONDANSETRON 2 MG/ML
4 INJECTION INTRAMUSCULAR; INTRAVENOUS ONCE
Status: COMPLETED | OUTPATIENT
Start: 2023-09-03 | End: 2023-09-03

## 2023-09-03 RX ORDER — LEVOTHYROXINE SODIUM 0.03 MG/1
25 TABLET ORAL
Status: DISCONTINUED | OUTPATIENT
Start: 2023-09-03 | End: 2023-09-06 | Stop reason: HOSPADM

## 2023-09-03 RX ORDER — DOCUSATE SODIUM 100 MG/1
100 CAPSULE, LIQUID FILLED ORAL 2 TIMES DAILY
Status: DISCONTINUED | OUTPATIENT
Start: 2023-09-03 | End: 2023-09-06 | Stop reason: HOSPADM

## 2023-09-03 RX ORDER — SUCRALFATE 1 G/1
1 TABLET ORAL ONCE
Status: DISCONTINUED | OUTPATIENT
Start: 2023-09-03 | End: 2023-09-05

## 2023-09-03 RX ORDER — ERGOCALCIFEROL 1.25 MG/1
50000 CAPSULE ORAL WEEKLY
Status: DISCONTINUED | OUTPATIENT
Start: 2023-09-08 | End: 2023-09-06 | Stop reason: HOSPADM

## 2023-09-03 RX ORDER — BISACODYL 10 MG
10 SUPPOSITORY, RECTAL RECTAL ONCE
Status: COMPLETED | OUTPATIENT
Start: 2023-09-03 | End: 2023-09-03

## 2023-09-03 RX ORDER — ESCITALOPRAM OXALATE 10 MG/1
10 TABLET ORAL DAILY
Status: DISCONTINUED | OUTPATIENT
Start: 2023-09-03 | End: 2023-09-06 | Stop reason: HOSPADM

## 2023-09-03 RX ORDER — PANTOPRAZOLE SODIUM 40 MG/1
40 TABLET, DELAYED RELEASE ORAL ONCE
Status: DISCONTINUED | OUTPATIENT
Start: 2023-09-03 | End: 2023-09-05

## 2023-09-03 RX ADMIN — POLYETHYLENE GLYCOL 3350 17 G: 17 POWDER, FOR SOLUTION ORAL at 09:30

## 2023-09-03 RX ADMIN — KETOROLAC TROMETHAMINE 15 MG: 30 INJECTION, SOLUTION INTRAMUSCULAR; INTRAVENOUS at 09:30

## 2023-09-03 RX ADMIN — KETOROLAC TROMETHAMINE 15 MG: 30 INJECTION, SOLUTION INTRAMUSCULAR; INTRAVENOUS at 19:22

## 2023-09-03 RX ADMIN — PANTOPRAZOLE SODIUM 40 MG: 40 TABLET, DELAYED RELEASE ORAL at 09:30

## 2023-09-03 RX ADMIN — ARIPIPRAZOLE 10 MG: 5 TABLET ORAL at 09:30

## 2023-09-03 RX ADMIN — SUCRALFATE 1 G: 1 TABLET ORAL at 09:30

## 2023-09-03 RX ADMIN — LEVOTHYROXINE SODIUM 25 MCG: 25 TABLET ORAL at 05:35

## 2023-09-03 RX ADMIN — DOCUSATE SODIUM 100 MG: 100 CAPSULE, LIQUID FILLED ORAL at 19:23

## 2023-09-03 RX ADMIN — ONDANSETRON 4 MG: 2 INJECTION INTRAMUSCULAR; INTRAVENOUS at 00:35

## 2023-09-03 RX ADMIN — GABAPENTIN 800 MG: 400 CAPSULE ORAL at 19:21

## 2023-09-03 RX ADMIN — DOCUSATE SODIUM 100 MG: 100 CAPSULE, LIQUID FILLED ORAL at 09:30

## 2023-09-03 RX ADMIN — METOCLOPRAMIDE 10 MG: 10 TABLET ORAL at 12:14

## 2023-09-03 RX ADMIN — MORPHINE SULFATE 4 MG: 4 INJECTION INTRAVENOUS at 00:38

## 2023-09-03 RX ADMIN — SUCRALFATE 1 G: 1 TABLET ORAL at 19:21

## 2023-09-03 RX ADMIN — METOCLOPRAMIDE 10 MG: 10 TABLET ORAL at 19:21

## 2023-09-03 RX ADMIN — MORPHINE SULFATE 2 MG: 2 INJECTION, SOLUTION INTRAMUSCULAR; INTRAVENOUS at 14:48

## 2023-09-03 RX ADMIN — PANTOPRAZOLE SODIUM 40 MG: 40 TABLET, DELAYED RELEASE ORAL at 19:21

## 2023-09-03 RX ADMIN — MORPHINE SULFATE 2 MG: 2 INJECTION, SOLUTION INTRAMUSCULAR; INTRAVENOUS at 03:43

## 2023-09-03 RX ADMIN — MORPHINE SULFATE 2 MG: 2 INJECTION, SOLUTION INTRAMUSCULAR; INTRAVENOUS at 23:53

## 2023-09-03 RX ADMIN — LACTULOSE 20 G: 20 SOLUTION ORAL at 19:24

## 2023-09-03 RX ADMIN — IOHEXOL 100 ML: 350 INJECTION, SOLUTION INTRAVENOUS at 01:11

## 2023-09-03 RX ADMIN — GABAPENTIN 800 MG: 400 CAPSULE ORAL at 09:30

## 2023-09-03 RX ADMIN — ASPIRIN 81 MG 81 MG: 81 TABLET ORAL at 09:30

## 2023-09-03 RX ADMIN — NICOTINE 1 PATCH: 21 PATCH, EXTENDED RELEASE TRANSDERMAL at 09:30

## 2023-09-03 RX ADMIN — MORPHINE SULFATE 2 MG: 2 INJECTION, SOLUTION INTRAMUSCULAR; INTRAVENOUS at 10:34

## 2023-09-03 RX ADMIN — ESCITALOPRAM OXALATE 10 MG: 10 TABLET ORAL at 09:30

## 2023-09-03 RX ADMIN — ATORVASTATIN CALCIUM 40 MG: 40 TABLET, FILM COATED ORAL at 19:21

## 2023-09-03 RX ADMIN — BISACODYL 10 MG: 10 SUPPOSITORY RECTAL at 21:59

## 2023-09-03 RX ADMIN — SUCRALFATE 1 G: 1 TABLET ORAL at 12:15

## 2023-09-03 NOTE — ASSESSMENT & PLAN NOTE
· Patient with a history of gastroparesis.   CT scan is consistent with that  · Limit narcotics given the gastroparesis possible  · We will try Toradol  · PPI  · GI consultation  · Bowel regimen

## 2023-09-03 NOTE — QUICK NOTE
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 Stressed the importance of strong glucose control. 2-3 MA's noted today in OS. Recommend consult with [facility], patient agrees with plan. Patient was seen and examined by me at bedside. Patient complains of bloating and abdominal distention increased pain after having breakfast this morning. Will convert the diet to clear liquid diet. GI consult awaited. Likely abdominal pain and bloating secondary to gastroparesis.

## 2023-09-03 NOTE — ASSESSMENT & PLAN NOTE
· put the patient on aspirin  · TIA pathway  · Make sure patient is on statin  · Neurology consultation  · MRI and echocardiogram  · Allow permissive hypertension

## 2023-09-03 NOTE — H&P
[address] [facility]  H&P  Name: Colin Blackmon 56 y.o. female I MRN: [id number]  Unit/Bed#: ED 09 I Date of Admission: 9/2/2023   Date of Service: 9/3/2023 I Hospital Day: 0      Assessment/Plan   Dysarthria  Assessment & Plan  · put the patient on aspirin  · Facial asymmetry noted  · TIA pathway  · Make sure patient is on statin  · Neurology consultation  · MRI and echocardiogram  · Allow permissive hypertension    Essential hypertension  Assessment & Plan  · Allow permissive hypertension given facial droop needing to rule out stroke    Diabetes mellitus type 2 in obese [facility])  Assessment & Plan  Lab Results   Component Value Date    HGBA1C 5.5 03/17/2023       Recent Labs     09/02/23  2345   POCGLU 164*       Blood Sugar Average: Last 72 hrs:  (P) 164   Well-controlled as per hemoglobin A1c about 6 months ago. Continue the Lantus  Sliding scale coverage    Tobacco abuse  Assessment & Plan  · Cessation recommended  · Nicotine patch    Bipolar 1 disorder, depressed (HCC)  Assessment & Plan  · Continue trazodone and Abilify    Generalized anxiety disorder  Assessment & Plan  · Continue Lexapro    * Abdominal pain  Assessment & Plan  · Patient with a history of gastroparesis. CT scan is consistent with that  · Limit narcotics given the gastroparesis possible  · We will try Toradol  · PPI  · GI consultation  · Bowel regimen          VTE Prophylaxis: Enoxaparin (Lovenox)  / sequential compression device   Code Status: Full code  POLST: POLST is not applicable to this patient      Anticipated Length of Stay:  Patient will be admitted on an Observation basis with an anticipated length of stay of as an I will anticipate less than 2 midnights 2 midnights. Justification for Hospital Stay: Patient will need observation overnight secondary to ongoing abdominal pain as well as having a facial droop    Total Time for Visit, including Counseling / Coordination of Care: 45 minutes.   Greater than 50% of this total time spent on direct patient counseling and coordination of care. Chief Complaint:   Abdominal pain    History of Present Illness:    Abilio Moore is a 56 y.o. female who presents with abdominal pain. This is a 56-year-old female patient with history of gastroparesis came in with intractable abdominal pain. Patient states she has had bouts of this. This current episode started about 4 days ago. The patient states that she is having worsening abdominal distention as well. She has not had a bowel movement in 4 to 5 days and has been passing gas but has been having shooting abdominal pain. The patient was diagnosed with gastroparesis and she is upset that nobody can do anything about that. Patient also had a facial droop which her boyfriend has noticed. That is been going on for the past 4 to 5 days as well. The patient states that she feels everything is normal but she does seem like she does have some mild dysarthria. When asked about any other neurological issues the patient states for a while once in a while she drops objects from her right hand. No other recent illnesses. Review of Systems:    Review of Systems   Constitutional: Positive for activity change and appetite change. Gastrointestinal: Positive for abdominal distention, abdominal pain and nausea. Neurological: Positive for facial asymmetry. All other systems reviewed and are negative.       Past Medical and Surgical History:     Past Medical History:   Diagnosis Date   • Addiction to drug [facility])    • Adjustment disorder    • Alcohol abuse    • Alcoholism ([address])    • Anxiety    • Bipolar disorder ([address])    • Bowel obstruction ([address])    • Depression    • Diabetes type 2, controlled ([address])    • Disease of thyroid gland    • Gastritis    • Head injury    • Heart palpitations    • Hyperlipidemia    • Hypertension    • Hypothyroidism    • Psychiatric illness    • PTSD (post-traumatic stress disorder)    • Seizure (HCC)    • Seizures [facility])    • Sleep difficulties    • Substance abuse ([address])    • Suicide attempt ([address])    • Withdrawal symptoms, drug or narcotic ([address])        Past Surgical History:   Procedure Laterality Date   • ABDOMINAL SURGERY     • APPENDECTOMY     • BOWEL RESECTION     • CHOLECYSTECTOMY     • ESOPHAGOGASTRODUODENOSCOPY N/A 05/18/2018    Procedure: ESOPHAGOGASTRODUODENOSCOPY (EGD) with bx;  Surgeon: Kymberly Arora DO;  Location: [facility]; Service: Gastroenterology   • HYSTERECTOMY     • KNEE SURGERY Bilateral 1991       Meds/Allergies:    Prior to Admission medications    Medication Sig Start Date End Date Taking? Authorizing Provider   Alcohol Swabs 70 % PADS May substitute brand based on insurance coverage. Check glucose TID. 6/22/22   Orville Conte PA-C   ARIPiprazole (ABILIFY) 10 mg tablet Take 1 tablet (10 mg total) by mouth daily 11/8/22   Hansa Mendieta MD   atorvastatin (LIPITOR) 20 mg tablet Take 1 tablet (20 mg total) by mouth daily 1/25/23   YONG Callejas   Blood Glucose Monitoring Suppl (OneTouch Verio Reflect) w/Device KIT May substitute brand based on insurance coverage.  Check glucose TID. 6/22/22   Orville Conte PA-C   clonazePAM (KlonoPIN) 0.5 mg tablet Take 1 tablet (0.5 mg total) by mouth 2 (two) times a day as needed for seizures 7/12/23   YONG Reyes   docusate sodium (COLACE) 100 mg capsule Take 1 capsule (100 mg total) by mouth 2 (two) times a day for 14 days 3/23/23 4/12/23  Lissette Carlton   docusate sodium (COLACE) 100 mg capsule Take 1 capsule (100 mg total) by mouth 2 (two) times a day 8/22/23   Lissette Carlton   ergocalciferol (VITAMIN D2) 50,000 units TAKE 1 CAPSULE BY MOUTH 1 TIME A WEEK 3/30/23   AYSHA Galindo DO   escitalopram (LEXAPRO) 10 mg tablet TAKE 1 TABLET(10 MG) BY MOUTH DAILY 2/1/23   Luis Crisostomo MD   gabapentin (NEURONTIN) 800 mg tablet TAKE 1 TABLET(800 MG) BY MOUTH THREE TIMES DAILY 4/19/23   YONG Reyes   hydrochlorothiazide (HYDRODIURIL) 12.5 mg tablet Take 1 tablet (12.5 mg total) by mouth daily 1/4/23   Grant Jewell MD   insulin glargine (LANTUS) 100 units/mL subcutaneous injection Inject 25 Units under the skin daily at bedtime 11/8/22   Nathan Thapa MD   levothyroxine 25 mcg tablet TAKE 1 TABLET(25 MCG) BY MOUTH DAILY IN THE EARLY MORNING 7/3/23   YONG Ness   metFORMIN (GLUCOPHAGE) 1000 MG tablet TAKE 1 TABLET(1000 MG) BY MOUTH TWICE DAILY WITH MEALS 2/6/23   Grant Jewell MD   metoclopramide (Reglan) 10 mg tablet Take 1 tablet (10 mg total) by mouth 2 (two) times a day before lunch and dinner 5/30/23   Kimberly Puentes MD   metoprolol tartrate (LOPRESSOR) 50 mg tablet TAKE 1 TABLET(50 MG) BY MOUTH EVERY 12 HOURS 5/8/23   YONG Ness   Microlet Lancets [facility]  1/3/22   Rj Hernandez MD   ondansetron (ZOFRAN) 4 mg tablet Take 1 tablet (4 mg total) by mouth every 8 (eight) hours as needed for nausea or vomiting 5/3/23   YONG Ramon   OneTouch Delica Lancets 66G MISC May substitute brand based on insurance coverage.  Check glucose TID. 6/22/22   José Miguel Vasquez PA-C   pantoprazole (PROTONIX) 40 mg tablet TAKE 1 TABLET BY MOUTH TWICE DAILY 7/26/23   YONG Woods   polyethylene glycol (MIRALAX) 17 g packet Take 17 g by mouth daily 5/3/23 8/1/23  YONG Woods   promethazine (PHENERGAN) 12.5 mg suppository Insert 1 suppository (12.5 mg total) into the rectum every 6 (six) hours as needed for nausea or vomiting 5/3/23   YONG Woods   semaglutide, 1 mg/dose, (Ozempic) 4 mg/3 mL injection pen Inject 0.75 mL (1 mg total) under the skin every 7 days 2/13/23   Grant Jewell MD   sucralfate (CARAFATE) 1 g tablet TAKE 1 TABLET(1 GRAM) BY MOUTH FOUR TIMES DAILY 8/7/23   YONG Woods   SUMAtriptan (IMITREX) 50 mg tablet Take 1 tablet (50 mg total) by mouth once as needed for migraine for up to 1 dose may repeat in 2 hours if necessary 5/15/23   YONG Breen   traZODone (DESYREL) 100 mg tablet Take 2 tablets (200 mg total) by mouth daily at bedtime 6/16/23   YONG Breen     I have reviewed home medications with a medical source (PCP, Pharmacy, other). Allergies:    Allergies   Allergen Reactions   • Losartan Cough   • Latex Rash       Social History:     Marital Status:      Patient Pre-hospital Living Situation: Boyfriend  Patient Pre-hospital Level of Mobility: Independent  Patient Pre-hospital Diet Restrictions: None  Substance Use History:   Social History     Substance and Sexual Activity   Alcohol Use Not Currently   • Alcohol/week: 6.0 standard drinks of alcohol   • Types: 6 Cans of beer per week    Comment: last drink on 08/15/20     Social History     Tobacco Use   Smoking Status Every Day   • Packs/day: 0.50   • Years: 25.00   • Total pack years: 12.50   • Types: Cigarettes   • Passive exposure: Current   Smokeless Tobacco Never   Tobacco Comments    smokes like 5 a day(06/24/2022)     Social History     Substance and Sexual Activity   Drug Use Never       Family History:    Family History   Problem Relation Age of Onset   • Bipolar disorder Mother    • Cancer Father    • Diabetes Father        Physical Exam:     Vitals:   Blood Pressure: 155/91 (09/03/23 0242)  Pulse: 85 (09/03/23 0242)  Temperature: 98.7 °F (37.1 °C) (09/02/23 2343)  Temp Source: Oral (09/02/23 2343)  Respirations: 18 (09/03/23 0242)  SpO2: 98 % (09/03/23 0242)    Physical Exam    (   General Appearance:    Alert, cooperative, no distress, appears stated age   Head:    Normocephalic, without obvious abnormality, atraumatic   Eyes:    PERRL, conjunctiva/corneas clear, EOM's intact,             Nose:   Nares normal, septum midline, mucosa normal   Throat:   Lips, mucosa, and tongue normal; teeth and gums normal   Neck:   Supple, symmetrical, no adenopathy;        thyroid:  No enlargement/tenderness/nodules; no carotid bruit or JVD   Back:     Symmetric, no curvature, ROM normal, no CVA tenderness   Lungs:     Clear to auscultation bilaterally, respirations unlabored       Heart:    Regular rate and rhythm, S1 and S2 normal, no murmur, rub    or gallop   Abdomen:    Distended. Bowel sounds present but decreased           Extremities:   Extremities normal, atraumatic, no cyanosis or edema   Pulses:   2+ and symmetric all extremities   Skin:   Skin color, texture, turgor normal, no rashes or lesions   Lymph nodes:   Cervical, supraclavicular, and axillary nodes normal   Neurologic:  Facialasymmetry. Be Sure to Include Physical Exam: Delete this entire line when you have entered your exam)    Additional Data:     Lab Results: I have personally reviewed pertinent reports. Results from last 7 days   Lab Units 09/03/23  0014   WBC Thousand/uL 10.45*   HEMOGLOBIN g/dL 13.1   HEMATOCRIT % 40.6   PLATELETS Thousands/uL 381   NEUTROS PCT % 54   LYMPHS PCT % 36   MONOS PCT % 8   EOS PCT % 2     Results from last 7 days   Lab Units 09/03/23  0014   SODIUM mmol/L 139   POTASSIUM mmol/L 3.4*   CHLORIDE mmol/L 100   CO2 mmol/L 31   BUN mg/dL 9   CREATININE mg/dL 0.79   ANION GAP mmol/L 8   CALCIUM mg/dL 10.0   ALBUMIN g/dL 4.8   TOTAL BILIRUBIN mg/dL 0.34   ALK PHOS U/L 92   ALT U/L 14   AST U/L 14   GLUCOSE RANDOM mg/dL 135     Results from last 7 days   Lab Units 09/03/23  0014   INR  0.80*     Results from last 7 days   Lab Units 09/02/23  2345   POC GLUCOSE mg/dl 164*               Imaging: I have personally reviewed pertinent reports. CT abdomen pelvis with contrast   Final Result by Barbie Vargas MD (09/03 0209)      Stomach is distended consistent with the known diagnosis of delayed gastric emptying.       No acute pathology visualized on CT of the abdomen and pelvis with IV without oral contrast.      Workstation performed: [id number]         CTA head and neck with and without contrast   Final Result by Boo Ochoa MD (09/03 0217)      No acute intracranial abnormality. No flow-limiting stenosis or large vessel occlusion within the major vessels of the [place]. No significant stenosis of the cervical carotid or vertebral arteries. Workstation performed: [id number]             ·     Four Interactive / Opposing Views Records Reviewed: Yes     ** Please Note: This note has been constructed using a voice recognition system.  **

## 2023-09-03 NOTE — CONSULTS
Consultation - [address] [place] [address] [facility]  Shadia Jensen 56 y.o. female MRN: [id number]  Unit/Bed#: -01 Encounter: [id number]    ASSESSMENT/PLAN:     #1.  Abdominal bloating/distention and abdominal pain without evidence of obstruction, though patient does appear to be constipated over the last several days.   Patient appears to have a generalized clinical picture of slow GI motility, has been diagnosed with gastroparesis recently, is also on different psychiatric and neuropathic medications which can contribute to constipation, and with history of hypothyroidism, many contributing factors    -Agree with MiraLAX twice daily for now until patient begins to have bowel movements    -Would recommend minimizing opioids were possible as these will exacerbate gastroparesis as well as constipation and bowel distention issues    -Can use simethicone as needed    -Encourage regular physical activity and ambulation within the patient's capabilities    -Neurology work-up with regards to reported slurred speech and facial droop    -Continue clear liquid diet for now, but if patient continues to demonstrate tolerance of liquids can gradually advance to a low fiber low residue diet with provisions for small frequent meals    -Continue PPI and Carafate; outpatient plans for EGD in 2 years and colonoscopy in 5 years noted for history of gastric intestinal metaplasia and adenomatous colon polyp    Inpatient consult to gastroenterology  Consult performed by: Diana Jane PA-C  Consult ordered by: Mati Potter MD          Reason for Consult / Principal Problem: Abdominal pain    HPI: Shadia Jensen is a 56y.o. year old female with history of bipolar disorder, cholecystectomy, diabetic gastroparesis (29% emptying at fourth hour on GES 5/22/2023) who presented to the emergency room last night with complaint of worsening abdominal pain over the last week, characterized as sharp with bloating, nausea, stabbing in quality and in the upper abdomen. She said she had not had a bowel movement for 3 days at the time she came to the ER (she tells me she still has not had a bowel movement now, for the last 4 days); denied any vomiting episodes. Her boyfriend had also noticed that she had slurred speech with some facial asymmetry for the last few days. Neurology consult is pending. She says she is still having a lot of abdominal pain at this time and becomes very tearful when discussing pain medications. CT scan of the abdomen and pelvis here showed distention of the stomach consistent with delayed emptying, otherwise no acute pathology was evidenced. LFTs and lipase appeared within normal limits, CBC generally unremarkable save for very mild and apparently chronic leukocytosis, white count 10.45. She recently had EGD and colonoscopy less than 6 months ago in March 2023, noting 1 colon polyp which was removed, moderate gastritis. Some patchy increased intraepithelial lymphocytes were noted on duodenal biopsy for which a celiac disease profile was ordered to follow-up (later found to be negative), gastric biopsies showed intestinal metaplasia with no dysplasia, and colon polyp was tubular adenoma, so altogether she was recommended for surveillance EGD in 2 years and colonoscopy in 5 years. She was previously on Ozempic, but our service had recommended her to discontinue this when her gastric emptying study in May came back with severely delayed emptying. She says she has been off this medication. She says she has also been taking Carafate regularly with meals at home. REVIEW OF SYSTEMS:    CONSTITUTIONAL: Denies any fever, chills, or rigors. Good appetite, and no recent weight loss. HEENT: No earache or tinnitus. Denies hearing loss or visual disturbances. CARDIOVASCULAR: No chest pain or palpitations. RESPIRATORY: Denies any cough, hemoptysis, shortness of breath or dyspnea on exertion.   GASTROINTESTINAL: As noted in the History of Present Illness. GENITOURINARY: No problems with urination. Denies any hematuria or dysuria. NEUROLOGIC: No dizziness or vertigo, denies headaches. MUSCULOSKELETAL: Denies any muscle or joint pain. SKIN: Denies skin rashes or itching. ENDOCRINE: Denies excessive thirst. Denies intolerance to heat or cold. PSYCHOSOCIAL: Denies depression or anxiety. Denies any recent memory loss. Historical Information   Past Medical History:   Diagnosis Date   • Addiction to drug [facility])    • Adjustment disorder    • Alcohol abuse    • Alcoholism ([address])    • Anxiety    • Bipolar disorder ([address])    • Bowel obstruction ([address])    • Depression    • Diabetes type 2, controlled ([address])    • Disease of thyroid gland    • Gastritis    • Head injury    • Heart palpitations    • Hyperlipidemia    • Hypertension    • Hypothyroidism    • Psychiatric illness    • PTSD (post-traumatic stress disorder)    • Seizure ([address])    • Seizures ([address])    • Sleep difficulties    • Substance abuse ([address])    • Suicide attempt ([address])    • Withdrawal symptoms, drug or narcotic ([address])      Past Surgical History:   Procedure Laterality Date   • ABDOMINAL SURGERY     • APPENDECTOMY     • BOWEL RESECTION     • CHOLECYSTECTOMY     • ESOPHAGOGASTRODUODENOSCOPY N/A 05/18/2018    Procedure: ESOPHAGOGASTRODUODENOSCOPY (EGD) with bx;  Surgeon: Rosendo Isaac DO;  Location: [facility];   Service: Gastroenterology   • HYSTERECTOMY     • KNEE SURGERY Bilateral 1991     Social History   Social History     Substance and Sexual Activity   Alcohol Use Not Currently   • Alcohol/week: 6.0 standard drinks of alcohol   • Types: 6 Cans of beer per week    Comment: last drink on 08/15/20     Social History     Substance and Sexual Activity   Drug Use Never     Social History     Tobacco Use   Smoking Status Every Day   • Packs/day: 0.50   • Years: 25.00   • Total pack years: 12.50   • Types: Cigarettes   • Passive exposure: Current   Smokeless Tobacco Never   Tobacco Comments    smokes like 5 a day(06/24/2022)     Family History   Problem Relation Age of Onset   • Bipolar disorder Mother    • Cancer Father    • Diabetes Father        Meds/Allergies     Medications Prior to Admission   Medication   • Alcohol Swabs 70 % PADS   • ARIPiprazole (ABILIFY) 10 mg tablet   • atorvastatin (LIPITOR) 20 mg tablet   • Blood Glucose Monitoring Suppl (OneTouch Verio Reflect) w/Device KIT   • clonazePAM (KlonoPIN) 0.5 mg tablet   • docusate sodium (COLACE) 100 mg capsule   • docusate sodium (COLACE) 100 mg capsule   • ergocalciferol (VITAMIN D2) 50,000 units   • escitalopram (LEXAPRO) 10 mg tablet   • gabapentin (NEURONTIN) 800 mg tablet   • hydrochlorothiazide (HYDRODIURIL) 12.5 mg tablet   • insulin glargine (LANTUS) 100 units/mL subcutaneous injection   • levothyroxine 25 mcg tablet   • metFORMIN (GLUCOPHAGE) 1000 MG tablet   • metoclopramide (Reglan) 10 mg tablet   • metoprolol tartrate (LOPRESSOR) 50 mg tablet   • Microlet Lancets MISC   • ondansetron (ZOFRAN) 4 mg tablet   • OneTouch Delica Lancets 43K MISC   • pantoprazole (PROTONIX) 40 mg tablet   • polyethylene glycol (MIRALAX) 17 g packet   • promethazine (PHENERGAN) 12.5 mg suppository   • semaglutide, 1 mg/dose, (Ozempic) 4 mg/3 mL injection pen   • sucralfate (CARAFATE) 1 g tablet   • SUMAtriptan (IMITREX) 50 mg tablet   • traZODone (DESYREL) 100 mg tablet     Current Facility-Administered Medications   Medication Dose Route Frequency   • acetaminophen (TYLENOL) tablet 650 mg  650 mg Oral Q4H PRN   • ARIPiprazole (ABILIFY) tablet 10 mg  10 mg Oral Daily   • aspirin chewable tablet 81 mg  81 mg Oral Daily   • atorvastatin (LIPITOR) tablet 40 mg  40 mg Oral QPM   • clonazePAM (KlonoPIN) tablet 0.5 mg  0.5 mg Oral BID PRN   • docusate sodium (COLACE) capsule 100 mg  100 mg Oral BID   • [START ON 9/8/2023] ergocalciferol (VITAMIN D2) capsule 50,000 Units  50,000 Units Oral Weekly   • escitalopram (LEXAPRO) tablet 10 mg 10 mg Oral Daily   • gabapentin (NEURONTIN) capsule 800 mg  800 mg Oral TID   • insulin glargine (LANTUS) subcutaneous injection 25 Units 0.25 mL  25 Units Subcutaneous HS   • insulin lispro (HumaLOG) 100 units/mL subcutaneous injection 1-5 Units  1-5 Units Subcutaneous TID AC   • insulin lispro (HumaLOG) 100 units/mL subcutaneous injection 1-5 Units  1-5 Units Subcutaneous HS   • ketorolac (TORADOL) injection 15 mg  15 mg Intravenous Q6H PRN   • levothyroxine tablet 25 mcg  25 mcg Oral Early Morning   • metoclopramide (REGLAN) tablet 10 mg  10 mg Oral BID before lunch/dinner   • nicotine (NICODERM CQ) 21 mg/24 hr TD 24 hr patch 1 patch  1 patch Transdermal Daily   • ondansetron (ZOFRAN) injection 4 mg  4 mg Intravenous Q6H PRN   • pantoprazole (PROTONIX) EC tablet 40 mg  40 mg Oral Once   • pantoprazole (PROTONIX) EC tablet 40 mg  40 mg Oral BID   • polyethylene glycol (MIRALAX) packet 17 g  17 g Oral BID   • sucralfate (CARAFATE) tablet 1 g  1 g Oral Once   • sucralfate (CARAFATE) tablet 1 g  1 g Oral 4x Daily (AC & HS)   • traZODone (DESYREL) tablet 200 mg  200 mg Oral HS       Allergies   Allergen Reactions   • Losartan Cough   • Latex Rash           Objective     Blood pressure 140/74, pulse 75, temperature (!) 96.1 °F (35.6 °C), temperature source Tympanic, resp. rate 20, height 5' 3" (1.6 m), weight 72.1 kg (159 lb), SpO2 97 %, not currently breastfeeding. No intake or output data in the 24 hours ending 09/03/23 0907      PHYSICAL EXAM     General Appearance:   Alert, tearful, appears to prefer to stand rather than sit or lay, cooperative, no distress, appears stated age    HEENT:   Normocephalic, atraumatic, anicteric.      Neck:  Supple, symmetrical, trachea midline, no adenopathy;    thyroid: no enlargement/tenderness/nodules; no carotid  bruit or JVD    Lungs:   Clear to auscultation bilaterally; no rales, rhonchi or wheezing; respirations unlabored    Heart[de-identified]   S1 and S2 normal; regular rate and rhythm; no murmur, rub, or gallop.    Abdomen:   Soft, distended but soft, mild generalized tenderness without guarding, normal bowel sounds; no masses, no organomegaly    Genitalia:   Deferred    Rectal:   Deferred    Extremities:  No cyanosis, clubbing or edema    Pulses:  2+ and symmetric all extremities    Skin:  Skin color, texture, turgor normal, no rashes or lesions    Lymph nodes:  No palpable cervical, axillary or inguinal lymphadenopathy        Lab Results:   Admission on 09/02/2023   Component Date Value   • POC Glucose 09/02/2023 164 (H)    • WBC 09/03/2023 10.45 (H)    • RBC 09/03/2023 4.48    • Hemoglobin 09/03/2023 13.1    • Hematocrit 09/03/2023 40.6    • MCV 09/03/2023 91    • MCH 09/03/2023 29.2    • MCHC 09/03/2023 32.3    • RDW 09/03/2023 12.6    • MPV 09/03/2023 9.6    • Platelets 39/80/9097 381    • nRBC 09/03/2023 0    • Neutrophils Relative 09/03/2023 54    • Immat GRANS % 09/03/2023 0    • Lymphocytes Relative 09/03/2023 36    • Monocytes Relative 09/03/2023 8    • Eosinophils Relative 09/03/2023 2    • Basophils Relative 09/03/2023 0    • Neutrophils Absolute 09/03/2023 5.63    • Immature Grans Absolute 09/03/2023 0.03    • Lymphocytes Absolute 09/03/2023 3.71    • Monocytes Absolute 09/03/2023 0.88    • Eosinophils Absolute 09/03/2023 0.16    • Basophils Absolute 09/03/2023 0.04    • Protime 09/03/2023 11.3 (L)    • INR 09/03/2023 0.80 (L)    • PTT 09/03/2023 29    • Sodium 09/03/2023 139    • Potassium 09/03/2023 3.4 (L)    • Chloride 09/03/2023 100    • CO2 09/03/2023 31    • ANION GAP 09/03/2023 8    • BUN 09/03/2023 9    • Creatinine 09/03/2023 0.79    • Glucose 09/03/2023 135    • Calcium 09/03/2023 10.0    • AST 09/03/2023 14    • ALT 09/03/2023 14    • Alkaline Phosphatase 09/03/2023 92    • Total Protein 09/03/2023 8.1    • Albumin 09/03/2023 4.8    • Total Bilirubin 09/03/2023 0.34    • eGFR 09/03/2023 84    • Lipase 09/03/2023 38    • hs TnI 0hr 09/03/2023 4    • Ethanol Lvl 09/03/2023 <10    • Color, UA 09/03/2023 Yellow    • Clarity, UA 09/03/2023 Clear    • Specific Gravity, UA 09/03/2023 <=1.005    • pH, UA 09/03/2023 6.5    • Leukocytes, UA 09/03/2023 Negative    • Nitrite, UA 09/03/2023 Negative    • Protein, UA 09/03/2023 Negative    • Glucose, UA 09/03/2023 Negative    • Ketones, UA 09/03/2023 Negative    • Urobilinogen, UA 09/03/2023 0.2    • Bilirubin, UA 09/03/2023 Negative    • Occult Blood, UA 09/03/2023 Negative    • Amph/Meth UR 09/03/2023 Negative    • Barbiturate Ur 09/03/2023 Negative    • Benzodiazepine Urine 09/03/2023 Negative    • Cocaine Urine 09/03/2023 Negative    • Opiate Urine 09/03/2023 Negative    • PCP Ur 09/03/2023 Negative    • THC Urine 09/03/2023 Negative    • Oxycodone Urine 09/03/2023 Negative    • Cholesterol 09/03/2023 158    • Triglycerides 09/03/2023 62    • HDL, Direct 09/03/2023 41 (L)    • LDL Calculated 09/03/2023 105 (H)    • Sodium 09/03/2023 139    • Potassium 09/03/2023 4.1    • Chloride 09/03/2023 105    • CO2 09/03/2023 28    • ANION GAP 09/03/2023 6    • BUN 09/03/2023 9    • Creatinine 09/03/2023 0.68    • Glucose 09/03/2023 100    • Calcium 09/03/2023 8.7    • eGFR 09/03/2023 98    • POC Glucose 09/03/2023 113        Imaging Studies: I have personally reviewed pertinent reports. CT ABDOMEN AND PELVIS WITH IV CONTRAST     INDICATION:   Abdominal pain, acute, nonlocalized  Diffuse abdominal pain.     COMPARISON: 923.     TECHNIQUE:  CT examination of the abdomen and pelvis was performed. Multiplanar 2D reformatted images were created from the source data.     This examination, like all CT scans performed in the [facility], was performed utilizing techniques to minimize radiation dose exposure, including the use of iterative reconstruction and automated exposure control.  Radiation dose length   product (DLP) for this visit:  548.7 mGy-cm     IV Contrast:  100 mL of iohexol (OMNIPAQUE)  Enteric Contrast:  Enteric contrast was not administered.     FINDINGS:     ABDOMEN     LOWER CHEST:  No clinically significant abnormality identified in the visualized lower chest.     LIVER/BILIARY TREE:  One or more subcentimeter sharply circumscribed low-density hepatic lesion(s) are noted, too small to accurately characterize, but statistically most likely to represent subcentimeter hepatic cysts. No suspicious solid hepatic   lesion is identified. Hepatic contours are normal.  No biliary dilatation.     GALLBLADDER: Gallbladder is surgically absent.     SPLEEN:  Unremarkable.     PANCREAS:  Unremarkable.     ADRENAL GLANDS:  Unremarkable.     KIDNEYS/URETERS:  Unremarkable. No hydronephrosis.     STOMACH AND BOWEL: Stomach is distended consistent with the known diagnosis of delayed gastric emptying.     APPENDIX:  No findings to suggest appendicitis.     ABDOMINOPELVIC CAVITY:  No ascites. No pneumoperitoneum. No lymphadenopathy.     VESSELS:  Unremarkable for patient's age.     PELVIS     REPRODUCTIVE ORGANS:  Unremarkable for patient's age.     URINARY BLADDER:  Unremarkable.     ABDOMINAL WALL/INGUINAL REGIONS:  Unremarkable.     OSSEOUS STRUCTURES:  No acute fracture or destructive osseous lesion.     IMPRESSION:     Stomach is distended consistent with the known diagnosis of delayed gastric emptying.     No acute pathology visualized on CT of the abdomen and pelvis with IV without oral contrast.        The patient was seen and examined by Dr. Isis Renae, all key medical decisions were made with Dr. Isis Rneae.  Thank you for allowing us to participate in the care of this pleasant patient. We will follow up with you closely.

## 2023-09-03 NOTE — ED PROVIDER NOTES
History  Chief Complaint   Patient presents with   • Abdominal Pain     Pt presents to the ED with generalized abdominal pain. Pt has been following with PCP and GI with no resolution of pain or symptoms     56-year-old female with history of diabetic gastroparesis presents to the emergency department for evaluation of worsening abdominal pain over the past week. The patient states that for the past week she has been having increased nausea, bloating and sharp, stabbing upper abdominal pain. Her last bowel movement was 3 days ago. She denies any episodes of vomiting and states that the last time she ate was approximately 2 hours prior to arrival.  The patient is accompanied by her boyfriend who also reports that over the past 3 days the patient has been having slurred speech with associated facial asymmetry. He reports that he feels like her slurred speech and asymmetry did improve somewhat today. Patient states that she does not notice that her speech is slurred. She states that she has been compliant with all of her prescribed medications. She denies headache, blurry vision, fevers, chills, vomiting, sick contacts, chest pain, shortness of breath and localized numbness, tingling or weakness. Prior to Admission Medications   Prescriptions Last Dose Informant Patient Reported? Taking? ARIPiprazole (ABILIFY) 10 mg tablet  Self No No   Sig: Take 1 tablet (10 mg total) by mouth daily   Alcohol Swabs 70 % PADS  Self No No   Sig: May substitute brand based on insurance coverage. Check glucose TID. Blood Glucose Monitoring Suppl (OneTouch Verio Reflect) w/Device KIT  Self No No   Sig: May substitute brand based on insurance coverage. Check glucose TID. Microlet Lancets MISC  Self Yes No   OneTouch Delica Lancets 23V MISC  Self No No   Sig: May substitute brand based on insurance coverage. Check glucose TID.    SUMAtriptan (IMITREX) 50 mg tablet  Self No No   Sig: Take 1 tablet (50 mg total) by mouth once as needed for migraine for up to 1 dose may repeat in 2 hours if necessary   atorvastatin (LIPITOR) 20 mg tablet  Self No No   Sig: Take 1 tablet (20 mg total) by mouth daily   clonazePAM (KlonoPIN) 0.5 mg tablet   No No   Sig: Take 1 tablet (0.5 mg total) by mouth 2 (two) times a day as needed for seizures   docusate sodium (COLACE) 100 mg capsule  Self No No   Sig: Take 1 capsule (100 mg total) by mouth 2 (two) times a day for 14 days   docusate sodium (COLACE) 100 mg capsule   No No   Sig: Take 1 capsule (100 mg total) by mouth 2 (two) times a day   ergocalciferol (VITAMIN D2) 50,000 units  Self No No   Sig: TAKE 1 CAPSULE BY MOUTH 1 TIME A WEEK   escitalopram (LEXAPRO) 10 mg tablet  Self No No   Sig: TAKE 1 TABLET(10 MG) BY MOUTH DAILY   gabapentin (NEURONTIN) 800 mg tablet  Self No No   Sig: TAKE 1 TABLET(800 MG) BY MOUTH THREE TIMES DAILY   hydrochlorothiazide (HYDRODIURIL) 12.5 mg tablet  Self No No   Sig: Take 1 tablet (12.5 mg total) by mouth daily   insulin glargine (LANTUS) 100 units/mL subcutaneous injection  Self No No   Sig: Inject 25 Units under the skin daily at bedtime   levothyroxine 25 mcg tablet   No No   Sig: TAKE 1 TABLET(25 MCG) BY MOUTH DAILY IN THE EARLY MORNING   metFORMIN (GLUCOPHAGE) 1000 MG tablet  Self No No   Sig: TAKE 1 TABLET(1000 MG) BY MOUTH TWICE DAILY WITH MEALS   metoclopramide (Reglan) 10 mg tablet   No No   Sig: Take 1 tablet (10 mg total) by mouth 2 (two) times a day before lunch and dinner   metoprolol tartrate (LOPRESSOR) 50 mg tablet  Self No No   Sig: TAKE 1 TABLET(50 MG) BY MOUTH EVERY 12 HOURS   ondansetron (ZOFRAN) 4 mg tablet  Self No No   Sig: Take 1 tablet (4 mg total) by mouth every 8 (eight) hours as needed for nausea or vomiting   pantoprazole (PROTONIX) 40 mg tablet   No No   Sig: TAKE 1 TABLET BY MOUTH TWICE DAILY   polyethylene glycol (MIRALAX) 17 g packet  Self No No   Sig: Take 17 g by mouth daily   promethazine (PHENERGAN) 12.5 mg suppository  Self No No   Sig: Insert 1 suppository (12.5 mg total) into the rectum every 6 (six) hours as needed for nausea or vomiting   semaglutide, 1 mg/dose, (Ozempic) 4 mg/3 mL injection pen  Self No No   Sig: Inject 0.75 mL (1 mg total) under the skin every 7 days   sucralfate (CARAFATE) 1 g tablet   No No   Sig: TAKE 1 TABLET(1 GRAM) BY MOUTH FOUR TIMES DAILY   traZODone (DESYREL) 100 mg tablet   No No   Sig: Take 2 tablets (200 mg total) by mouth daily at bedtime      Facility-Administered Medications: None       Past Medical History:   Diagnosis Date   • Addiction to drug [facility])    • Adjustment disorder    • Alcohol abuse    • Alcoholism ([address])    • Anxiety    • Bipolar disorder ([address])    • Bowel obstruction ([address])    • Depression    • Diabetes type 2, controlled ([address])    • Disease of thyroid gland    • Gastritis    • Head injury    • Heart palpitations    • Hyperlipidemia    • Hypertension    • Hypothyroidism    • Psychiatric illness    • PTSD (post-traumatic stress disorder)    • Seizure ([address])    • Seizures ([address])    • Sleep difficulties    • Substance abuse ([address])    • Suicide attempt ([address])    • Withdrawal symptoms, drug or narcotic ([address])        Past Surgical History:   Procedure Laterality Date   • ABDOMINAL SURGERY     • APPENDECTOMY     • BOWEL RESECTION     • CHOLECYSTECTOMY     • ESOPHAGOGASTRODUODENOSCOPY N/A 05/18/2018    Procedure: ESOPHAGOGASTRODUODENOSCOPY (EGD) with bx;  Surgeon: Ruperto Heimlich, DO;  Location: [facility]; Service: Gastroenterology   • HYSTERECTOMY     • KNEE SURGERY Bilateral 1991       Family History   Problem Relation Age of Onset   • Bipolar disorder Mother    • Cancer Father    • Diabetes Father      I have reviewed and agree with the history as documented.     E-Cigarette/Vaping   • E-Cigarette Use Never User      E-Cigarette/Vaping Substances   • Nicotine No    • THC No    • CBD No    • Flavoring No    • Other No    • Unknown No      Social History     Tobacco Use   • Smoking status: Every Day Packs/day: 0.50     Years: 25.00     Total pack years: 12.50     Types: Cigarettes     Passive exposure: Current   • Smokeless tobacco: Never   • Tobacco comments:     smokes like 5 a day(06/24/2022)   Vaping Use   • Vaping Use: Never used   Substance Use Topics   • Alcohol use: Not Currently     Alcohol/week: 6.0 standard drinks of alcohol     Types: 6 Cans of beer per week     Comment: last drink on 08/15/20   • Drug use: Never       Review of Systems   Constitutional: Negative for chills and fever. HENT: Negative for ear pain and sore throat. Eyes: Negative for pain and visual disturbance. Respiratory: Negative for cough and shortness of breath. Cardiovascular: Negative for chest pain and palpitations. Gastrointestinal: Positive for abdominal pain, constipation and nausea. Negative for vomiting. Genitourinary: Negative for dysuria and hematuria. Musculoskeletal: Negative for arthralgias and back pain. Skin: Negative for color change and rash. Neurological: Positive for facial asymmetry and speech difficulty. Negative for seizures and syncope. All other systems reviewed and are negative. Physical Exam  Physical Exam  Vitals and nursing note reviewed. Constitutional:       General: She is in acute distress. Appearance: She is well-developed. HENT:      Head: Normocephalic and atraumatic. Eyes:      Extraocular Movements: Extraocular movements intact. Conjunctiva/sclera: Conjunctivae normal.      Pupils: Pupils are equal, round, and reactive to light. Visual Fields: Right eye visual fields normal and left eye visual fields normal.   Cardiovascular:      Rate and Rhythm: Normal rate and regular rhythm. Heart sounds: No murmur heard. Pulmonary:      Effort: Pulmonary effort is normal. No respiratory distress. Breath sounds: Normal breath sounds. Abdominal:      General: There is distension. Palpations: Abdomen is soft. Tenderness:  There is generalized abdominal tenderness. There is no guarding or rebound. Musculoskeletal:         General: No swelling. Cervical back: Neck supple. Skin:     General: Skin is warm and dry. Capillary Refill: Capillary refill takes less than 2 seconds. Neurological:      Mental Status: She is alert and oriented to person, place, and time. GCS: GCS eye subscore is 4. GCS verbal subscore is 5. GCS motor subscore is 6. Cranial Nerves: Dysarthria and facial asymmetry present. Sensory: Sensation is intact. Motor: Motor function is intact. Coordination: Coordination is intact. Gait: Gait is intact.    Psychiatric:         Mood and Affect: Mood normal.         Vital Signs  ED Triage Vitals [09/02/23 2343]   Temperature Pulse Respirations Blood Pressure SpO2   98.7 °F (37.1 °C) 89 16 (!) 174/100 98 %      Temp Source Heart Rate Source Patient Position - Orthostatic VS BP Location FiO2 (%)   Oral Monitor Sitting Left arm --      Pain Score       10 - Worst Possible Pain           Vitals:    09/02/23 2343 09/03/23 0217 09/03/23 0242   BP: (!) 174/100 147/77 155/91   Pulse: 89  85   Patient Position - Orthostatic VS: Sitting  Standing         Visual Acuity  Visual Acuity    Flowsheet Row Most Recent Value   L Pupil Size (mm) 3   R Pupil Size (mm) 3          ED Medications  Medications   sucralfate (CARAFATE) tablet 1 g (1 g Oral Not Given 9/3/23 0245)   pantoprazole (PROTONIX) EC tablet 40 mg (40 mg Oral Not Given 9/3/23 0245)   clonazePAM (KlonoPIN) tablet 0.5 mg (has no administration in time range)   ARIPiprazole (ABILIFY) tablet 10 mg (has no administration in time range)   docusate sodium (COLACE) capsule 100 mg (has no administration in time range)   ergocalciferol (VITAMIN D2) capsule 50,000 Units (has no administration in time range)   escitalopram (LEXAPRO) tablet 10 mg (has no administration in time range)   gabapentin (NEURONTIN) capsule 800 mg (has no administration in time range)   insulin glargine (LANTUS) subcutaneous injection 25 Units 0.25 mL (has no administration in time range)   pantoprazole (PROTONIX) EC tablet 40 mg (has no administration in time range)   metoclopramide (REGLAN) tablet 10 mg (has no administration in time range)   levothyroxine tablet 25 mcg (25 mcg Oral Given 9/3/23 8575)   sucralfate (CARAFATE) tablet 1 g (has no administration in time range)   traZODone (DESYREL) tablet 200 mg (has no administration in time range)   acetaminophen (TYLENOL) tablet 650 mg (has no administration in time range)   ondansetron (ZOFRAN) injection 4 mg (has no administration in time range)   nicotine (NICODERM CQ) 21 mg/24 hr TD 24 hr patch 1 patch (has no administration in time range)   atorvastatin (LIPITOR) tablet 40 mg (has no administration in time range)   aspirin chewable tablet 81 mg (has no administration in time range)   insulin lispro (HumaLOG) 100 units/mL subcutaneous injection 1-5 Units (has no administration in time range)   insulin lispro (HumaLOG) 100 units/mL subcutaneous injection 1-5 Units (has no administration in time range)   polyethylene glycol (MIRALAX) packet 17 g (has no administration in time range)   ketorolac (TORADOL) injection 15 mg (has no administration in time range)   morphine injection 4 mg (4 mg Intravenous Given 9/3/23 0038)   ondansetron (ZOFRAN) injection 4 mg (4 mg Intravenous Given 9/3/23 0035)   iohexol (OMNIPAQUE) 350 MG/ML injection (SINGLE-DOSE) 100 mL (100 mL Intravenous Given 9/3/23 0111)   morphine injection 2 mg (2 mg Intravenous Given 9/3/23 0343)       Diagnostic Studies  Results Reviewed     Procedure Component Value Units Date/Time    [place] Total AB W Reflex to IGM/IGG [[id number]]     Lab Status: No result Specimen: Blood     Lipid Panel with Direct LDL reflex [[id number]]     Lab Status: No result Specimen: Blood     Hemoglobin A1c w/EAG Estimation [[id number]]     Lab Status: No result Specimen: Blood     Basic metabolic panel [[id number]]     Lab Status: No result Specimen: Blood     Rapid drug screen, urine [[id number]] Collected: 09/03/23 0014    Lab Status: Final result Specimen: Urine, Clean Catch Updated: 09/03/23 0113     Amph/Meth UR Negative     Barbiturate Ur Negative     Benzodiazepine Urine Negative     Cocaine Urine Negative     Methadone Urine --     Opiate Urine Negative     PCP Ur Negative     THC Urine Negative     Oxycodone Urine Negative    Narrative:      No methadone test performed; if required call laboratory  FOR MEDICAL PURPOSES ONLY. IF CONFIRMATION NEEDED PLEASE CONTACT THE LAB WITHIN 5 DAYS.     Drug Screen Cutoff Levels:  AMPHETAMINE/METHAMPHETAMINES  1000 ng/mL  BARBITURATES     200 ng/mL  BENZODIAZEPINES     200 ng/mL  COCAINE      300 ng/mL  METHADONE      300 ng/mL  OPIATES      300 ng/mL  PHENCYCLIDINE     25 ng/mL  THC       50 ng/mL  OXYCODONE      100 ng/mL    HS Troponin 0hr (reflex protocol) [[id number]]  (Normal) Collected: 09/03/23 0014    Lab Status: Final result Specimen: Blood from Arm, Right Updated: 09/03/23 0046     hs TnI 0hr 4 ng/L     Ethanol [[id number]]  (Normal) Collected: 09/03/23 0014    Lab Status: Final result Specimen: Blood from Arm, Right Updated: 09/03/23 0042     Ethanol Lvl <10 mg/dL     Comprehensive metabolic panel [[id number]]  (Abnormal) Collected: 09/03/23 0014    Lab Status: Final result Specimen: Blood from Arm, Right Updated: 09/03/23 0042     Sodium 139 mmol/L      Potassium 3.4 mmol/L      Chloride 100 mmol/L      CO2 31 mmol/L      ANION GAP 8 mmol/L      BUN 9 mg/dL      Creatinine 0.79 mg/dL      Glucose 135 mg/dL      Calcium 10.0 mg/dL      AST 14 U/L      ALT 14 U/L      Alkaline Phosphatase 92 U/L      Total Protein 8.1 g/dL      Albumin 4.8 g/dL      Total Bilirubin 0.34 mg/dL      eGFR 84 ml/min/1.73sq m     Narrative:      [place] guidelines for Chronic Kidney Disease (CKD):   •  Stage 1 with normal or high GFR (GFR > 90 mL/min/1.73 square meters)  •  Stage 2 Mild CKD (GFR = 60-89 mL/min/1.73 square meters)  •  Stage 3A Moderate CKD (GFR = 45-59 mL/min/1.73 square meters)  •  Stage 3B Moderate CKD (GFR = 30-44 mL/min/1.73 square meters)  •  Stage 4 Severe CKD (GFR = 15-29 mL/min/1.73 square meters)  •  Stage 5 End Stage CKD (GFR <15 mL/min/1.73 square meters)  Note: GFR calculation is accurate only with a steady state creatinine    Lipase [[id number]]  (Normal) Collected: 09/03/23 0014    Lab Status: Final result Specimen: Blood from Arm, Right Updated: 09/03/23 0042     Lipase 38 u/L     Protime-INR [[id number]]  (Abnormal) Collected: 09/03/23 0014    Lab Status: Final result Specimen: Blood from Arm, Right Updated: 09/03/23 0032     Protime 11.3 seconds      INR 0.80    APTT [[id number]]  (Normal) Collected: 09/03/23 0014    Lab Status: Final result Specimen: Blood from Arm, Right Updated: 09/03/23 0032     PTT 29 seconds     UA (URINE) with reflex to Scope [[id number]]  (Normal) Collected: 09/03/23 0014    Lab Status: Final result Specimen: Urine, Clean Catch Updated: 09/03/23 0025     Color, UA Yellow     Clarity, UA Clear     Specific Gravity, UA <=1.005     pH, UA 6.5     Leukocytes, UA Negative     Nitrite, UA Negative     Protein, UA Negative mg/dl      Glucose, UA Negative mg/dl      Ketones, UA Negative mg/dl      Urobilinogen, UA 0.2 E.U./dl      Bilirubin, UA Negative     Occult Blood, UA Negative    CBC and differential [[id number]]  (Abnormal) Collected: 09/03/23 0014    Lab Status: Final result Specimen: Blood from Arm, Right Updated: 09/03/23 0022     WBC 10.45 Thousand/uL      RBC 4.48 Million/uL      Hemoglobin 13.1 g/dL      Hematocrit 40.6 %      MCV 91 fL      MCH 29.2 pg      MCHC 32.3 g/dL      RDW 12.6 %      MPV 9.6 fL      Platelets 052 Thousands/uL      nRBC 0 /100 WBCs      Neutrophils Relative 54 %      Immat GRANS % 0 %      Lymphocytes Relative 36 %      Monocytes Relative 8 %      Eosinophils Relative 2 % Basophils Relative 0 %      Neutrophils Absolute 5.63 Thousands/µL      Immature Grans Absolute 0.03 Thousand/uL      Lymphocytes Absolute 3.71 Thousands/µL      Monocytes Absolute 0.88 Thousand/µL      Eosinophils Absolute 0.16 Thousand/µL      Basophils Absolute 0.04 Thousands/µL     Fingerstick Glucose (POCT) [[id number]]  (Abnormal) Collected: 09/02/23 2345    Lab Status: Final result Updated: 09/02/23 2346     POC Glucose 164 mg/dl                  CT abdomen pelvis with contrast   Final Result by Elsworth Saint, MD (09/03 0209)      Stomach is distended consistent with the known diagnosis of delayed gastric emptying. No acute pathology visualized on CT of the abdomen and pelvis with IV without oral contrast.      Workstation performed: [id number]         CTA head and neck with and without contrast   Final Result by Jessica Weeks MD (09/03 0217)      No acute intracranial abnormality. No flow-limiting stenosis or large vessel occlusion within the major vessels of the [place]. No significant stenosis of the cervical carotid or vertebral arteries.             Workstation performed: [id number]         MRI Inpatient Order    (Results Pending)              Procedures  ECG 12 Lead Documentation Only    Date/Time: 9/3/2023 1:22 AM    Performed by: Johana Hdz MD  Authorized by: Johana Hdz MD    ECG reviewed by me, the ED Provider: yes    Patient location:  ED  Previous ECG:     Previous ECG:  Unavailable    Comparison to cardiac monitor: Yes    Interpretation:     Interpretation: abnormal    Rate:     ECG rate assessment: normal    Rhythm:     Rhythm: sinus rhythm and A-V block    Ectopy:     Ectopy: none    QRS:     QRS axis:  Normal  Conduction:     Conduction: abnormal      Abnormal conduction: 1st degree    ST segments:     ST segments:  Normal  T waves:     T waves: normal               ED Course                 Medical Decision Making  47-year-old female presented to the emergency department for evaluation of abdominal pain and strokelike symptoms. On arrival the patient was awake, alert, oriented and in mild painful distress. Initial vital signs show that the patient was hypertensive but otherwise her vital signs were within normal limits. On exam the patient was noted to have diffuse abdominal tenderness to palpation with distention. Patient also with facial asymmetry and slight dysarthria that has been ongoing for the past several days. CT scan of the patient's abdomen and pelvis was ordered to evaluate for acute abdominal and pelvic pathology including but not limited to obstruction, perforation, pancreatitis, infection, abscess. CT scan was consistent with the patient's history of gastroparesis otherwise no acute findings noted. CTA within normal limits. Blood work was grossly unremarkable. Patient was treated with analgesic and antiemetic medication. All diagnostic studies were discussed with the patient in detail. Recommendation was made for the patient to be admitted to the hospital for further treatment and evaluation. Patient in agreement with the plan. Abdominal pain: acute illness or injury  Stroke-like symptoms: acute illness or injury  Amount and/or Complexity of Data Reviewed  Labs: ordered. Radiology: ordered. ECG/medicine tests: ordered and independent interpretation performed. Risk  Prescription drug management. Decision regarding hospitalization.           Disposition  Final diagnoses:   Stroke-like symptoms   Abdominal pain     Time reflects when diagnosis was documented in both MDM as applicable and the Disposition within this note     Time User Action Codes Description Comment    9/3/2023  2:34 AM Kurtis Figueroa Add [R29.90] Stroke-like symptoms     9/3/2023  2:34 AM Kurtis Figueroa Add [R10.9] Abdominal pain     9/3/2023  3:19 AM Kaylah Luis Add [R47.1] Dysarthria       ED Disposition     ED Disposition   Admit    Condition Stable    Date/Time   Sun Sep 3, 2023  2:34 AM    Comment   Case was discussed with wendy and the patient's admission status was agreed to be Admission Status: observation status to the service of Dr. Dakotah España. Follow-up Information    None         Patient's Medications   Discharge Prescriptions    No medications on file       No discharge procedures on file.     PDMP Review       Value Time User    PDMP Reviewed  Yes 7/12/2023  4:04 PM Frank Younger, [address] [place] [address]          ED Provider  Electronically Signed by           Annabella Llanos MD  09/03/23 5262

## 2023-09-03 NOTE — ASSESSMENT & PLAN NOTE
Lab Results   Component Value Date    HGBA1C 5.5 03/17/2023       Recent Labs     09/02/23  2345   POCGLU 164*       Blood Sugar Average: Last 72 hrs:  (P) 164   Well-controlled as per hemoglobin A1c about 6 months ago.   Continue the Lantus  Sliding scale coverage

## 2023-09-03 NOTE — ED NOTES
Patient ambulated to bathroom at this time to provide urine sample.      Gela Scott RN  09/02/23 9914

## 2023-09-04 LAB
GLUCOSE SERPL-MCNC: 115 MG/DL (ref 65–140)
GLUCOSE SERPL-MCNC: 120 MG/DL (ref 65–140)
GLUCOSE SERPL-MCNC: 122 MG/DL (ref 65–140)
GLUCOSE SERPL-MCNC: 147 MG/DL (ref 65–140)

## 2023-09-04 PROCEDURE — 99232 SBSQ HOSP IP/OBS MODERATE 35: CPT

## 2023-09-04 PROCEDURE — 82948 REAGENT STRIP/BLOOD GLUCOSE: CPT

## 2023-09-04 PROCEDURE — 99214 OFFICE O/P EST MOD 30 MIN: CPT | Performed by: INTERNAL MEDICINE

## 2023-09-04 PROCEDURE — 99447 NTRPROF PH1/NTRNET/EHR 11-20: CPT | Performed by: PSYCHIATRY & NEUROLOGY

## 2023-09-04 RX ORDER — ASPIRIN 81 MG/1
81 TABLET, CHEWABLE ORAL DAILY
Status: CANCELLED | OUTPATIENT
Start: 2023-09-04

## 2023-09-04 RX ADMIN — SUCRALFATE 1 G: 1 TABLET ORAL at 09:36

## 2023-09-04 RX ADMIN — LACTULOSE 20 G: 20 SOLUTION ORAL at 09:37

## 2023-09-04 RX ADMIN — METOCLOPRAMIDE 10 MG: 10 TABLET ORAL at 11:49

## 2023-09-04 RX ADMIN — SUCRALFATE 1 G: 1 TABLET ORAL at 21:16

## 2023-09-04 RX ADMIN — POLYETHYLENE GLYCOL 3350 17 G: 17 POWDER, FOR SOLUTION ORAL at 21:16

## 2023-09-04 RX ADMIN — DOCUSATE SODIUM 100 MG: 100 CAPSULE, LIQUID FILLED ORAL at 09:37

## 2023-09-04 RX ADMIN — GABAPENTIN 800 MG: 400 CAPSULE ORAL at 09:37

## 2023-09-04 RX ADMIN — ESCITALOPRAM OXALATE 10 MG: 10 TABLET ORAL at 09:37

## 2023-09-04 RX ADMIN — PANTOPRAZOLE SODIUM 40 MG: 40 TABLET, DELAYED RELEASE ORAL at 17:22

## 2023-09-04 RX ADMIN — GABAPENTIN 800 MG: 400 CAPSULE ORAL at 21:16

## 2023-09-04 RX ADMIN — INSULIN GLARGINE 25 UNITS: 100 INJECTION, SOLUTION SUBCUTANEOUS at 21:16

## 2023-09-04 RX ADMIN — SUCRALFATE 1 G: 1 TABLET ORAL at 11:48

## 2023-09-04 RX ADMIN — LACTULOSE 20 G: 20 SOLUTION ORAL at 21:16

## 2023-09-04 RX ADMIN — ATORVASTATIN CALCIUM 40 MG: 40 TABLET, FILM COATED ORAL at 17:21

## 2023-09-04 RX ADMIN — GABAPENTIN 800 MG: 400 CAPSULE ORAL at 17:22

## 2023-09-04 RX ADMIN — ARIPIPRAZOLE 10 MG: 5 TABLET ORAL at 09:36

## 2023-09-04 RX ADMIN — MORPHINE SULFATE 2 MG: 2 INJECTION, SOLUTION INTRAMUSCULAR; INTRAVENOUS at 19:36

## 2023-09-04 RX ADMIN — PANTOPRAZOLE SODIUM 40 MG: 40 TABLET, DELAYED RELEASE ORAL at 09:37

## 2023-09-04 RX ADMIN — SUCRALFATE 1 G: 1 TABLET ORAL at 17:22

## 2023-09-04 RX ADMIN — TRAZODONE HYDROCHLORIDE 200 MG: 100 TABLET ORAL at 21:16

## 2023-09-04 RX ADMIN — MORPHINE SULFATE 2 MG: 2 INJECTION, SOLUTION INTRAMUSCULAR; INTRAVENOUS at 12:42

## 2023-09-04 RX ADMIN — LACTULOSE 20 G: 20 SOLUTION ORAL at 17:22

## 2023-09-04 RX ADMIN — METOCLOPRAMIDE 10 MG: 10 TABLET ORAL at 17:22

## 2023-09-04 RX ADMIN — DOCUSATE SODIUM 100 MG: 100 CAPSULE, LIQUID FILLED ORAL at 17:21

## 2023-09-04 RX ADMIN — NICOTINE 1 PATCH: 21 PATCH, EXTENDED RELEASE TRANSDERMAL at 09:37

## 2023-09-04 RX ADMIN — POLYETHYLENE GLYCOL 3350 17 G: 17 POWDER, FOR SOLUTION ORAL at 09:37

## 2023-09-04 RX ADMIN — ASPIRIN 81 MG 81 MG: 81 TABLET ORAL at 09:36

## 2023-09-04 RX ADMIN — LEVOTHYROXINE SODIUM 25 MCG: 25 TABLET ORAL at 05:20

## 2023-09-04 NOTE — PLAN OF CARE
Problem: PAIN - ADULT  Goal: Verbalizes/displays adequate comfort level or baseline comfort level  Description: Interventions:  - Encourage patient to monitor pain and request assistance  - Assess pain using appropriate pain scale  - Administer analgesics based on type and severity of pain and evaluate response  - Implement non-pharmacological measures as appropriate and evaluate response  - Consider cultural and social influences on pain and pain management  - Notify physician/advanced practitioner if interventions unsuccessful or patient reports new pain  Outcome: Progressing     Problem: INFECTION - ADULT  Goal: Absence or prevention of progression during hospitalization  Description: INTERVENTIONS:  - Assess and monitor for signs and symptoms of infection  - Monitor lab/diagnostic results  - Monitor all insertion sites, i.e. indwelling lines, tubes, and drains  - Monitor endotracheal if appropriate and nasal secretions for changes in amount and color  - [place] appropriate cooling/warming therapies per order  - Administer medications as ordered  - Instruct and encourage patient and family to use good hand hygiene technique  - Identify and instruct in appropriate isolation precautions for identified infection/condition  Outcome: Progressing     Problem: SAFETY ADULT  Goal: Patient will remain free of falls  Description: INTERVENTIONS:  - Educate patient/family on patient safety including physical limitations  - Instruct patient to call for assistance with activity   - Consult OT/PT to assist with strengthening/mobility   - Keep Call bell within reach  - Keep bed low and locked with side rails adjusted as appropriate  - Keep care items and personal belongings within reach  - Initiate and maintain comfort rounds  - Make Fall Risk Sign visible to staff  - Offer Toileting every Hours, in advance of need  - Initiate/Maintain alarm  - Obtain necessary fall risk management equipment:   - Apply yellow socks and bracelet for high fall risk patients  - Consider moving patient to room near nurses station  Outcome: Progressing     Problem: DISCHARGE PLANNING  Goal: Discharge to home or other facility with appropriate resources  Description: INTERVENTIONS:  - Identify barriers to discharge w/patient and caregiver  - Arrange for needed discharge resources and transportation as appropriate  - Identify discharge learning needs (meds, wound care, etc.)  - Arrange for interpretive services to assist at discharge as needed  - Refer to Case Management Department for coordinating discharge planning if the patient needs post-hospital services based on physician/advanced practitioner order or complex needs related to functional status, cognitive ability, or social support system  Outcome: Progressing     Problem: Knowledge Deficit  Goal: Patient/family/caregiver demonstrates understanding of disease process, treatment plan, medications, and discharge instructions  Description: Complete learning assessment and assess knowledge base. Interventions:  - Provide teaching at level of understanding  - Provide teaching via preferred learning methods  Outcome: Progressing     Problem: Knowledge Deficit  Goal: Patient/family/caregiver demonstrates understanding of disease process, treatment plan, medications, and discharge instructions  Description: Complete learning assessment and assess knowledge base.   Interventions:  - Provide teaching at level of understanding  - Provide teaching via preferred learning methods  Outcome: Progressing     Problem: NEUROSENSORY - ADULT  Goal: Achieves stable or improved neurological status  Description: INTERVENTIONS  - Monitor and report changes in neurological status  - Monitor vital signs such as temperature, blood pressure, glucose, and any other labs ordered   - Initiate measures to prevent increased intracranial pressure  - Monitor for seizure activity and implement precautions if appropriate      Outcome: Progressing

## 2023-09-04 NOTE — PROGRESS NOTES
[address]  Progress Note  Name: Lilian Adams  MRN: [id number]  Unit/Bed#: -01 I Date of Admission: 9/2/2023   Date of Service: 9/4/2023 I Hospital Day: 0    Assessment/Plan   * Abdominal pain  Assessment & Plan  · Hx of gastroparesis. · CT scan consistent with gastroparesis  · Limit narcotics   · S/p IV Toradol with improvement in Abd pain  · C/w PPI  · Advance diet as tolerates  · Currently CLD, will trial FLD for breakfast  · GI following  · Consider KUB if Abd pain distention persists or worsens  · Continue Bowel regimen  · Will order repeat Enema this evening    Dysarthria  Assessment & Plan  · TIA pathway  · C/w Aspirin and Statin  · MRI Brain: No MR evidence of acute ischemia.   · Echocardiogram: Ordered  · Neurology consulted  · Low clinical suspicion for acute vascular event given duration of symptoms and negative MRI  · Suspect some degree of encephalopathy unclear etiology but may be related to sedating meds  · Cannot entirely exclude subacute or chronic CVA given risk factors  · Continue aspirin  · No need for further additional stroke work-up at this time    Essential hypertension  Assessment & Plan  · Allow permissive hypertension on admission given TIA workup  · MRI now negative  · BP reviewed and mildly elevated  · Will resume home Metoprolol  · Can like Hold on HCTZ until oral intake improves    Diabetes mellitus type 2 in obese [facility])  Assessment & Plan  Lab Results   Component Value Date    HGBA1C 5.5 03/17/2023       Recent Labs     09/03/23  2123 09/04/23  0712 09/04/23  1107 09/04/23  1613   POCGLU 94 120 147* 115       Blood Sugar Average: Last 72 hrs:  (P) 121     · Home Medication:  · Lantus, Metformin and Ozempic  · Hold oral diabetic regimen while admitted  · Glucose well maintained  · C/w Lantus and SSI  · Hypoglycemic diet  · Initiate Diabetic diet when tolerating oral intake    Tobacco abuse  Assessment & Plan  · Educated on smoking Cessation  · NRT while admitted    Bipolar 1 disorder, depressed ([address])  Assessment & Plan  · Continue Home medication:   · Trazodone and Abilify    Generalized anxiety disorder  Assessment & Plan  · Continue Home medication:  · Lexapro             VTE Pharmacologic Prophylaxis: VTE Score: 2 Low Risk (Score 0-2) - Encourage Ambulation. Patient Centered Rounds: I performed bedside rounds with nursing staff today. Discussions with Specialists or Other Care Team Provider: GI    Education and Discussions with Family / Patient: Patient declined call to . Total Time Spent on Date of Encounter in care of patient: 55 minutes This time was spent on one or more of the following: performing physical exam; counseling and coordination of care; obtaining or reviewing history; documenting in the medical record; reviewing/ordering tests, medications or procedures; communicating with other healthcare professionals and discussing with patient's family/caregivers. Current Length of Stay: 0 day(s)  Current Patient Status: Inpatient   Certification Statement: The patient will continue to require additional inpatient hospital stay due to Abdominal pain and bloating requiring GI consultation and gradual advancement in diet  Discharge Plan: Anticipate discharge in 24-48 hrs to home. Code Status: Level 1 - Full Code    Subjective:   Patient states she still having some abdominal pain and bloating but much improved. Patient denies any chest pain, shortness of breath, any episodes of nausea or vomiting. Objective:     Vitals:   Temp (24hrs), Av.1 °F (36.7 °C), Min:97.7 °F (36.5 °C), Max:98.9 °F (37.2 °C)    Temp:  [97.7 °F (36.5 °C)-98.9 °F (37.2 °C)] 97.9 °F (36.6 °C)  HR:  [58-74] 65  Resp:  [16-18] 16  BP: (143-155)/(75-81) 143/76  SpO2:  [94 %-96 %] 96 %  Body mass index is 28.17 kg/m². Input and Output Summary (last 24 hours):      Intake/Output Summary (Last 24 hours) at 2023 1651  Last data filed at 2023 1101  Gross per 24 hour   Intake 700 ml   Output --   Net 700 ml       Physical Exam:   Physical Exam  Vitals and nursing note reviewed. Constitutional:       General: She is not in acute distress. HENT:      Head: Normocephalic. Nose: Nose normal. No congestion. Mouth/Throat:      Mouth: Mucous membranes are moist.      Pharynx: Oropharynx is clear. Cardiovascular:      Rate and Rhythm: Normal rate and regular rhythm. Pulmonary:      Effort: Pulmonary effort is normal. No respiratory distress. Abdominal:      General: Bowel sounds are decreased. There is distension. Palpations: Abdomen is soft. Tenderness: There is no abdominal tenderness. Musculoskeletal:      Cervical back: Normal range of motion. Skin:     General: Skin is warm and dry. Capillary Refill: Capillary refill takes less than 2 seconds. Neurological:      Mental Status: She is alert and oriented to person, place, and time. Mental status is at baseline. Psychiatric:         Mood and Affect: Mood is anxious. Speech: Speech is slurred (Mild Dysarthria, Appears stable at baseline). Behavior: Behavior is cooperative.          Judgment: Judgment normal.          Additional Data:     Labs:  Results from last 7 days   Lab Units 09/03/23  0014   WBC Thousand/uL 10.45*   HEMOGLOBIN g/dL 13.1   HEMATOCRIT % 40.6   PLATELETS Thousands/uL 381   NEUTROS PCT % 54   LYMPHS PCT % 36   MONOS PCT % 8   EOS PCT % 2     Results from last 7 days   Lab Units 09/03/23  0538 09/03/23  0014   SODIUM mmol/L 139 139   POTASSIUM mmol/L 4.1 3.4*   CHLORIDE mmol/L 105 100   CO2 mmol/L 28 31   BUN mg/dL 9 9   CREATININE mg/dL 0.68 0.79   ANION GAP mmol/L 6 8   CALCIUM mg/dL 8.7 10.0   ALBUMIN g/dL  --  4.8   TOTAL BILIRUBIN mg/dL  --  0.34   ALK PHOS U/L  --  92   ALT U/L  --  14   AST U/L  --  14   GLUCOSE RANDOM mg/dL 100 135     Results from last 7 days   Lab Units 09/03/23  0014   INR  0.80*     Results from last 7 days Lab Units 23  1613 23  1107 23  0712 23  2123 23  1623 23  1108 23  0902 23  2345   POC GLUCOSE mg/dl 115 147* 120 94 114 101 113 164*               Lines/Drains:  Invasive Devices     Peripheral Intravenous Line  Duration           Peripheral IV 23 Left;Ventral (anterior) Forearm <1 day                  Telemetry:  Telemetry Orders (From admission, onward)             24 Hour Telemetry Monitoring  Continuous x 24 Hours ([place])           Question:  Reason for 24 Hour Telemetry  Answer:  TIA/Suspected CVA/ Confirmed CVA                 Telemetry Reviewed: Ordered but not inititated   Indication for Continued Telemetry Use: No indication for continued use. Will discontinue.               Imaging: Reviewed radiology reports from this admission including: MRI brain    Recent Cultures (last 7 days):         Last 24 Hours Medication List:   Current Facility-Administered Medications   Medication Dose Route Frequency Provider Last Rate   • acetaminophen  650 mg Oral Q4H PRN Edgard Collins MD     • ARIPiprazole  10 mg Oral Daily Edgard Collins MD     • aspirin  81 mg Oral Daily Edgard Collins MD     • atorvastatin  40 mg Oral QPM Edgard Collins MD     • clonazePAM  0.5 mg Oral BID PRN Edgard Collins MD     • docusate sodium  100 mg Oral BID Edgard Collins MD     • [START ON 2023] ergocalciferol  50,000 Units Oral Weekly Edgard Collins MD     • escitalopram  10 mg Oral Daily Edgard Collins MD     • gabapentin  800 mg Oral TID Edgard Collins MD     • insulin glargine  25 Units Subcutaneous HS Edgard Collins MD     • insulin lispro  1-5 Units Subcutaneous TID AC Edgard Collins MD     • insulin lispro  1-5 Units Subcutaneous HS Edgard Collins MD     • lactulose  20 g Oral TID Gino Donaldson MD     • levothyroxine  25 mcg Oral Early Morning Egdard Collins MD     • metoclopramide  10 mg Oral BID before lunch/dinner Edgard Sauceda MD     • morphine injection  2 mg Intravenous Q4H PRN Kena Serrano MD     • nicotine  1 patch Transdermal Daily Edgard Sauceda MD     • ondansetron  4 mg Intravenous Q6H PRN Edgard Sauceda MD     • pantoprazole  40 mg Oral Once Edgard Sauceda MD     • pantoprazole  40 mg Oral BID Edgard Sauceda MD     • polyethylene glycol  17 g Oral BID Edgard Sauceda MD     • sucralfate  1 g Oral Once Edgard Sauceda MD     • sucralfate  1 g Oral 4x Daily (AC & HS) Edgard Sauceda MD     • traZODone  200 mg Oral HS Edgard Sauceda MD          Today, Patient Was Seen By: YONG Carrillo    **Please Note: This note may have been constructed using a voice recognition system. **

## 2023-09-04 NOTE — PLAN OF CARE
Problem: PAIN - ADULT  Goal: Verbalizes/displays adequate comfort level or baseline comfort level  Description: Interventions:  - Encourage patient to monitor pain and request assistance  - Assess pain using appropriate pain scale  - Administer analgesics based on type and severity of pain and evaluate response  - Implement non-pharmacological measures as appropriate and evaluate response  - Consider cultural and social influences on pain and pain management  - Notify physician/advanced practitioner if interventions unsuccessful or patient reports new pain  Outcome: Progressing     Problem: INFECTION - ADULT  Goal: Absence or prevention of progression during hospitalization  Description: INTERVENTIONS:  - Assess and monitor for signs and symptoms of infection  - Monitor lab/diagnostic results  - Monitor all insertion sites, i.e. indwelling lines, tubes, and drains  - Monitor endotracheal if appropriate and nasal secretions for changes in amount and color  - [place] appropriate cooling/warming therapies per order  - Administer medications as ordered  - Instruct and encourage patient and family to use good hand hygiene technique  - Identify and instruct in appropriate isolation precautions for identified infection/condition  Outcome: Progressing  Goal: Absence of fever/infection during neutropenic period  Description: INTERVENTIONS:  - Monitor WBC    Outcome: Progressing     Problem: SAFETY ADULT  Goal: Patient will remain free of falls  Description: INTERVENTIONS:  - Educate patient/family on patient safety including physical limitations  - Instruct patient to call for assistance with activity   - Consult OT/PT to assist with strengthening/mobility   - Keep Call bell within reach  - Keep bed low and locked with side rails adjusted as appropriate  - Keep care items and personal belongings within reach  - Initiate and maintain comfort rounds  - Make Fall Risk Sign visible to staff  - Offer Toileting every 2 Hours, in advance of need  - Initiate/Maintain bed alarm  - Apply yellow socks and bracelet for high fall risk patients  - Consider moving patient to room near nurses station  Outcome: Progressing  Goal: Maintain or return to baseline ADL function  Description: INTERVENTIONS:  -  Assess patient's ability to carry out ADLs; assess patient's baseline for ADL function and identify physical deficits which impact ability to perform ADLs (bathing, care of mouth/teeth, toileting, grooming, dressing, etc.)  - Assess/evaluate cause of self-care deficits   - Assess range of motion  - Assess patient's mobility; develop plan if impaired  - Assess patient's need for assistive devices and provide as appropriate  - Encourage maximum independence but intervene and supervise when necessary  - Involve family in performance of ADLs  - Assess for home care needs following discharge   - Consider OT consult to assist with ADL evaluation and planning for discharge  - Provide patient education as appropriate  Outcome: Progressing  Goal: Maintains/Returns to pre admission functional level  Description: INTERVENTIONS:  - Perform BMAT or MOVE assessment daily.   - Set and communicate daily mobility goal to care team and patient/family/caregiver. - Collaborate with rehabilitation services on mobility goals if consulted  - Perform Range of Motion 3 times a day. - Reposition patient every 2 hours.   - Dangle patient 3 times a day  - Stand patient 3 times a day  - Ambulate patient 3 times a day  - Out of bed to chair 3 times a day   - Out of bed for meals 3 times a day  - Out of bed for toileting  - Record patient progress and toleration of activity level   Outcome: Progressing     Problem: DISCHARGE PLANNING  Goal: Discharge to home or other facility with appropriate resources  Description: INTERVENTIONS:  - Identify barriers to discharge w/patient and caregiver  - Arrange for needed discharge resources and transportation as appropriate  - Identify discharge learning needs (meds, wound care, etc.)  - Arrange for interpretive services to assist at discharge as needed  - Refer to Case Management Department for coordinating discharge planning if the patient needs post-hospital services based on physician/advanced practitioner order or complex needs related to functional status, cognitive ability, or social support system  Outcome: Progressing     Problem: Knowledge Deficit  Goal: Patient/family/caregiver demonstrates understanding of disease process, treatment plan, medications, and discharge instructions  Description: Complete learning assessment and assess knowledge base. Interventions:  - Provide teaching at level of understanding  - Provide teaching via preferred learning methods  Outcome: Progressing     Problem: NEUROSENSORY - ADULT  Goal: Achieves stable or improved neurological status  Description: INTERVENTIONS  - Monitor and report changes in neurological status  - Monitor vital signs such as temperature, blood pressure, glucose, and any other labs ordered   - Initiate measures to prevent increased intracranial pressure  - Monitor for seizure activity and implement precautions if appropriate      Outcome: Progressing  Goal: Achieves maximal functionality and self care  Description: INTERVENTIONS  - Monitor swallowing and airway patency with patient fatigue and changes in neurological status  - Encourage and assist patient to increase activity and self care. - Encourage visually impaired, hearing impaired and aphasic patients to use assistive/communication devices  Outcome: Progressing     Problem: Nutrition/Hydration-ADULT  Goal: Nutrient/Hydration intake appropriate for improving, restoring or maintaining nutritional needs  Description: Monitor and assess patient's nutrition/hydration status for malnutrition. Collaborate with interdisciplinary team and initiate plan and interventions as ordered.   Monitor patient's weight and dietary intake as ordered or per policy. Utilize nutrition screening tool and intervene as necessary. Determine patient's food preferences and provide high-protein, high-caloric foods as appropriate.      INTERVENTIONS:  - Monitor oral intake, urinary output, labs, and treatment plans  - Assess nutrition and hydration status and recommend course of action  - Evaluate amount of meals eaten  - Assist patient with eating if necessary   - Allow adequate time for meals  - Recommend/ encourage appropriate diets, oral nutritional supplements, and vitamin/mineral supplements  - Order, calculate, and assess calorie counts as needed  - Recommend, monitor, and adjust tube feedings and TPN/PPN based on assessed needs  - Assess need for intravenous fluids  - Provide specific nutrition/hydration education as appropriate  - Include patient/family/caregiver in decisions related to nutrition  Outcome: Progressing

## 2023-09-04 NOTE — CONSULTS
e-Consult ([facility])   Shadia Jensen 56 y.o. female MRN: [id number]  Unit/Bed#: -01 Encounter: [id number]      Reason for Consult / Principal Problem: Dysarthria, question stroke  Consults  09/04/23      ASSESSMENT:  28-year-old female with hypertension, hyperlipidemia, diabetes, diabetic gastroparesis, PTSD, bipolar, EtOH abuse who presents with worsening abdominal pain over the past 1 week with associated nausea, and bloating. Likely related to gastroparesis. Patient's significant other reports that patient has also had some slurred speech with some facial asymmetry for the past few days as well. Patient however denies this.  -Passed SLP evaluation. -MRI brain negative for any acute or subacute ischemia.  -Patient is on considerable sedating medications including gabapentin 800 3 times daily and (clonazepam 0.5 mg twice daily as needed seizures ever UDS negative for benzos)  -EtOH level less than 10.  -Blood pressure initially elevated 174/100 however has since normalized. RECOMMENDATIONS:  Low clinical suspicion for acute vascular event given duration of symptoms and negative MRI (TIA unlikely). -Suspect some degree of encephalopathy, unclear etiology but may be related to sedating meds as mentioned above.  -Cannot entirely exclude subacute or chronic CVA given risk factors. - Would add aspirin 81 mg daily to patient's home medication regiment. -Given negative MRI, no need for any additional stroke work-up at this time. - Call with any additional questions/concerns. 11-20 minutes, >50% of the total time devoted to medical consultative verbal/EMR discussion between providers. Written report will be generated in the EMR.     Navi Garza DO

## 2023-09-04 NOTE — PLAN OF CARE
Problem: PAIN - ADULT  Goal: Verbalizes/displays adequate comfort level or baseline comfort level  Description: Interventions:  - Encourage patient to monitor pain and request assistance  - Assess pain using appropriate pain scale  - Administer analgesics based on type and severity of pain and evaluate response  - Implement non-pharmacological measures as appropriate and evaluate response  - Consider cultural and social influences on pain and pain management  - Notify physician/advanced practitioner if interventions unsuccessful or patient reports new pain  Outcome: Progressing     Problem: PAIN - ADULT  Goal: Verbalizes/displays adequate comfort level or baseline comfort level  Description: Interventions:  - Encourage patient to monitor pain and request assistance  - Assess pain using appropriate pain scale  - Administer analgesics based on type and severity of pain and evaluate response  - Implement non-pharmacological measures as appropriate and evaluate response  - Consider cultural and social influences on pain and pain management  - Notify physician/advanced practitioner if interventions unsuccessful or patient reports new pain  Outcome: Progressing     Problem: INFECTION - ADULT  Goal: Absence or prevention of progression during hospitalization  Description: INTERVENTIONS:  - Assess and monitor for signs and symptoms of infection  - Monitor lab/diagnostic results  - Monitor all insertion sites, i.e. indwelling lines, tubes, and drains  - Monitor endotracheal if appropriate and nasal secretions for changes in amount and color  - [place] appropriate cooling/warming therapies per order  - Administer medications as ordered  - Instruct and encourage patient and family to use good hand hygiene technique  - Identify and instruct in appropriate isolation precautions for identified infection/condition  Outcome: Progressing  Goal: Absence of fever/infection during neutropenic period  Description: INTERVENTIONS:  - Monitor WBC    Outcome: Progressing

## 2023-09-04 NOTE — ASSESSMENT & PLAN NOTE
Lab Results   Component Value Date    HGBA1C 5.5 03/17/2023       Recent Labs     09/03/23  2123 09/04/23  0712 09/04/23  1107 09/04/23  1613   POCGLU 94 120 147* 115       Blood Sugar Average: Last 72 hrs:  (P) 121     · Home Medication:  · Lantus, Metformin and Ozempic  · Hold oral diabetic regimen while admitted  · Glucose well maintained  · C/w Lantus and SSI  · Hypoglycemic diet  · Initiate Diabetic diet when tolerating oral intake

## 2023-09-04 NOTE — SPEECH THERAPY NOTE
Speech Language/Pathology  Speech/Language Pathology  Assessment    Patient Name: Constantin Arce  JAWWA'P Date: 9/4/2023     Problem List  Principal Problem:    Abdominal pain  Active Problems:    Essential hypertension    Generalized anxiety disorder    Bipolar 1 disorder, depressed ([address])    Tobacco abuse    Diabetes mellitus type 2 in obese [facility])    Dysarthria    Constantin Arce is a 56 y.o. female who presents with abdominal pain. This is a 82-year-old female patient with history of gastroparesis came in with intractable abdominal pain. Patient states she has had bouts of this. This current episode started about 4 days ago. The patient states that she is having worsening abdominal distention as well. She has not had a bowel movement in 4 to 5 days and has been passing gas but has been having shooting abdominal pain. The patient was diagnosed with gastroparesis and she is upset that nobody can do anything about that. Patient also had a facial droop which her boyfriend has noticed. That is been going on for the past 4 to 5 days as well. The patient states that she feels everything is normal but she does seem like she does have some mild dysarthria. When asked about any other neurological issues the patient states for a while once in a while she drops objects from her right hand. No other recent illnesses. Consult received for speech/swallow eval on stroke pathway. Pt passed nsg swallow screen; pt is on clear liquid diet due to abd pain and tolerating current diet w/o s/s dysphagia or aspiration. No speech/language deficits reported. MRI: 9/3/23  No MR evidence of acute ischemia. No need for formal speech/swallow eval at this time. Reconsult if needed. Rec advance diet as tolerated pe GI.      Kel Washington CCC-SLP  Speech Pathologist  Available via  tiger text

## 2023-09-04 NOTE — ASSESSMENT & PLAN NOTE
· Allow permissive hypertension on admission given TIA workup  · MRI now negative  · BP reviewed and mildly elevated  · Will resume home Metoprolol  · Can like Hold on HCTZ until oral intake improves

## 2023-09-04 NOTE — ASSESSMENT & PLAN NOTE
· Hx of gastroparesis.     · CT scan consistent with gastroparesis  · Limit narcotics   · S/p IV Toradol with improvement in Abd pain  · C/w PPI  · Advance diet as tolerates  · Currently CLD, will trial FLD for breakfast  · GI following  · Consider KUB if Abd pain distention persists or worsens  · Continue Bowel regimen  · Will order repeat Enema this evening

## 2023-09-04 NOTE — PROGRESS NOTES
Progress Note - Uriel Barahona 56 y.o. female MRN: [id number]    Unit/Bed#: -[place] Encounter: [id number]      Assessment/Plan:  #1. Abdominal pain with distention, gaseous in nature, showing no evidence of bowel obstruction at this time; patient noted with gastroparesis and longstanding use of likely constipating medications. Appears to be gradually improving clinically    -Monitor abdominal exam, consider checking KUB if patient noted with worsening abdominal distention    -Continue to encourage regular physical activity and positional changes within the patient's capabilities    -Continue regular regimen of MiraLAX, minimize use of opioid pain medications were possible as these can exacerbate gastroparesis and bowel dysmotility    Subjective:   Patient reports she is passing a lot of gas and has noticed some improvement in her abdominal distention since yesterday. No vomiting episodes since yesterday. Still having some abdominal pain which was better earlier in the day. Objective:     Vitals: Blood pressure 152/76, pulse 69, temperature 97.9 °F (36.6 °C), temperature source Oral, resp. rate 16, height 5' 3" (1.6 m), weight 72.1 kg (159 lb), SpO2 95 %, not currently breastfeeding. ,Body mass index is 28.17 kg/m².       Intake/Output Summary (Last 24 hours) at 9/4/2023 1501  Last data filed at 9/4/2023 1101  Gross per 24 hour   Intake 700 ml   Output --   Net 700 ml       Physical Exam:   General appearance: alert, appears stated age and cooperative  Lungs: clear to auscultation bilaterally, no labored breathing/accessory muscle use  Heart: regular rate and rhythm, S1, S2 normal, no murmur, click, rub or gallop  Abdomen: soft, non-tender; bowel sounds normal; no masses,  no organomegaly  Extremities: no edema    Invasive Devices     Peripheral Intravenous Line  Duration           Peripheral IV 09/04/23 Left;Ventral (anterior) Forearm <1 day                Lab, Imaging and other studies: I have personally reviewed pertinent reports.     Admission on 09/02/2023   Component Date Value   • POC Glucose 09/02/2023 164 (H)    • WBC 09/03/2023 10.45 (H)    • RBC 09/03/2023 4.48    • Hemoglobin 09/03/2023 13.1    • Hematocrit 09/03/2023 40.6    • MCV 09/03/2023 91    • MCH 09/03/2023 29.2    • MCHC 09/03/2023 32.3    • RDW 09/03/2023 12.6    • MPV 09/03/2023 9.6    • Platelets 93/24/5204 381    • nRBC 09/03/2023 0    • Neutrophils Relative 09/03/2023 54    • Immat GRANS % 09/03/2023 0    • Lymphocytes Relative 09/03/2023 36    • Monocytes Relative 09/03/2023 8    • Eosinophils Relative 09/03/2023 2    • Basophils Relative 09/03/2023 0    • Neutrophils Absolute 09/03/2023 5.63    • Immature Grans Absolute 09/03/2023 0.03    • Lymphocytes Absolute 09/03/2023 3.71    • Monocytes Absolute 09/03/2023 0.88    • Eosinophils Absolute 09/03/2023 0.16    • Basophils Absolute 09/03/2023 0.04    • Protime 09/03/2023 11.3 (L)    • INR 09/03/2023 0.80 (L)    • PTT 09/03/2023 29    • Sodium 09/03/2023 139    • Potassium 09/03/2023 3.4 (L)    • Chloride 09/03/2023 100    • CO2 09/03/2023 31    • ANION GAP 09/03/2023 8    • BUN 09/03/2023 9    • Creatinine 09/03/2023 0.79    • Glucose 09/03/2023 135    • Calcium 09/03/2023 10.0    • AST 09/03/2023 14    • ALT 09/03/2023 14    • Alkaline Phosphatase 09/03/2023 92    • Total Protein 09/03/2023 8.1    • Albumin 09/03/2023 4.8    • Total Bilirubin 09/03/2023 0.34    • eGFR 09/03/2023 84    • Lipase 09/03/2023 38    • hs TnI 0hr 09/03/2023 4    • Ethanol Lvl 09/03/2023 <10    • Color, UA 09/03/2023 Yellow    • Clarity, UA 09/03/2023 Clear    • Specific Gravity, UA 09/03/2023 <=1.005    • pH, UA 09/03/2023 6.5    • Leukocytes, UA 09/03/2023 Negative    • Nitrite, UA 09/03/2023 Negative    • Protein, UA 09/03/2023 Negative    • Glucose, UA 09/03/2023 Negative    • Ketones, UA 09/03/2023 Negative    • Urobilinogen, UA 09/03/2023 0.2    • Bilirubin, UA 09/03/2023 Negative    • Occult Blood, UA 09/03/2023 Negative    • Amph/Meth UR 09/03/2023 Negative    • Barbiturate Ur 09/03/2023 Negative    • Benzodiazepine Urine 09/03/2023 Negative    • Cocaine Urine 09/03/2023 Negative    • Opiate Urine 09/03/2023 Negative    • PCP Ur 09/03/2023 Negative    • THC Urine 09/03/2023 Negative    • Oxycodone Urine 09/03/2023 Negative    • Cholesterol 09/03/2023 158    • Triglycerides 09/03/2023 62    • HDL, Direct 09/03/2023 41 (L)    • LDL Calculated 09/03/2023 105 (H)    • Sodium 09/03/2023 139    • Potassium 09/03/2023 4.1    • Chloride 09/03/2023 105    • CO2 09/03/2023 28    • ANION GAP 09/03/2023 6    • BUN 09/03/2023 9    • Creatinine 09/03/2023 0.68    • Glucose 09/03/2023 100    • Calcium 09/03/2023 8.7    • eGFR 09/03/2023 98    • Lyme Total Antibodies 09/03/2023 Negative    • POC Glucose 09/03/2023 113    • POC Glucose 09/03/2023 101    • POC Glucose 09/03/2023 114    • POC Glucose 09/03/2023 94    • POC Glucose 09/04/2023 120    • POC Glucose 09/04/2023 147 (H)

## 2023-09-04 NOTE — ASSESSMENT & PLAN NOTE
· TIA pathway  · C/w Aspirin and Statin  · MRI Brain: No MR evidence of acute ischemia.   · Echocardiogram: Ordered  · Neurology consulted  · Low clinical suspicion for acute vascular event given duration of symptoms and negative MRI  · Suspect some degree of encephalopathy unclear etiology but may be related to sedating meds  · Cannot entirely exclude subacute or chronic CVA given risk factors  · Continue aspirin  · No need for further additional stroke work-up at this time

## 2023-09-05 ENCOUNTER — APPOINTMENT (INPATIENT)
Dept: NON INVASIVE DIAGNOSTICS | Facility: HOSPITAL | Age: 58
DRG: 074 | End: 2023-09-05
Payer: COMMERCIAL

## 2023-09-05 LAB
ANION GAP SERPL CALCULATED.3IONS-SCNC: 6 MMOL/L
AORTIC ROOT: 3 CM
APICAL FOUR CHAMBER EJECTION FRACTION: 64 %
ASCENDING AORTA: 3.3 CM
AV REGURGITATION PRESSURE HALF TIME: 561 MS
BUN SERPL-MCNC: 7 MG/DL (ref 5–25)
CALCIUM SERPL-MCNC: 8.7 MG/DL (ref 8.4–10.2)
CHLORIDE SERPL-SCNC: 105 MMOL/L (ref 96–108)
CO2 SERPL-SCNC: 29 MMOL/L (ref 21–32)
CREAT SERPL-MCNC: 0.64 MG/DL (ref 0.6–1.3)
E WAVE DECELERATION TIME: 189 MS
ERYTHROCYTE [DISTWIDTH] IN BLOOD BY AUTOMATED COUNT: 12.5 % (ref 11.6–15.1)
FRACTIONAL SHORTENING: 36 % (ref 28–44)
GFR SERPL CREATININE-BSD FRML MDRD: 100 ML/MIN/1.73SQ M
GLUCOSE SERPL-MCNC: 103 MG/DL (ref 65–140)
GLUCOSE SERPL-MCNC: 104 MG/DL (ref 65–140)
GLUCOSE SERPL-MCNC: 137 MG/DL (ref 65–140)
GLUCOSE SERPL-MCNC: 145 MG/DL (ref 65–140)
GLUCOSE SERPL-MCNC: 63 MG/DL (ref 65–140)
GLUCOSE SERPL-MCNC: 79 MG/DL (ref 65–140)
GLUCOSE SERPL-MCNC: 97 MG/DL (ref 65–140)
HCT VFR BLD AUTO: 34.2 % (ref 34.8–46.1)
HGB BLD-MCNC: 11 G/DL (ref 11.5–15.4)
INTERVENTRICULAR SEPTUM IN DIASTOLE (PARASTERNAL SHORT AXIS VIEW): 1.1 CM
INTERVENTRICULAR SEPTUM: 1.2 CM (ref 0.6–1.1)
LAAS-AP2: 20.7 CM2
LAAS-AP4: 24.5 CM2
LEFT ATRIUM SIZE: 3.8 CM
LEFT ATRIUM VOLUME (MOD BIPLANE): 69 ML
LEFT INTERNAL DIMENSION IN SYSTOLE: 2.9 CM (ref 2.1–4)
LEFT VENTRICULAR INTERNAL DIMENSION IN DIASTOLE: 4.5 CM (ref 3.5–6)
LEFT VENTRICULAR POSTERIOR WALL IN END DIASTOLE: 1.1 CM
LEFT VENTRICULAR STROKE VOLUME: 82 ML
LVSV (TEICH): 82 ML
MCH RBC QN AUTO: 29.6 PG (ref 26.8–34.3)
MCHC RBC AUTO-ENTMCNC: 32.2 G/DL (ref 31.4–37.4)
MCV RBC AUTO: 92 FL (ref 82–98)
MV E'TISSUE VEL-SEP: 9 CM/S
MV PEAK A VEL: 0.58 M/S
MV PEAK E VEL: 73 CM/S
MV STENOSIS PRESSURE HALF TIME: 55 MS
MV VALVE AREA P 1/2 METHOD: 4 CM2
PLATELET # BLD AUTO: 312 THOUSANDS/UL (ref 149–390)
PMV BLD AUTO: 9.8 FL (ref 8.9–12.7)
POTASSIUM SERPL-SCNC: 3.1 MMOL/L (ref 3.5–5.3)
RBC # BLD AUTO: 3.72 MILLION/UL (ref 3.81–5.12)
RIGHT ATRIAL 2D VOLUME: 37 ML
RIGHT ATRIUM AREA SYSTOLE A4C: 14.4 CM2
RIGHT VENTRICLE ID DIMENSION: 3.4 CM
SL CV AV DECELERATION TIME RETROGRADE: 1934 MS
SL CV AV PEAK GRADIENT RETROGRADE: 77 MMHG
SL CV LEFT ATRIUM LENGTH A2C: 5.9 CM
SL CV LV EF: 65
SL CV PED ECHO LEFT VENTRICLE DIASTOLIC VOLUME (MOD BIPLANE) 2D: 115 ML
SL CV PED ECHO LEFT VENTRICLE SYSTOLIC VOLUME (MOD BIPLANE) 2D: 32 ML
SODIUM SERPL-SCNC: 140 MMOL/L (ref 135–147)
TR MAX PG: 21 MMHG
TR PEAK VELOCITY: 2.3 M/S
TRICUSPID ANNULAR PLANE SYSTOLIC EXCURSION: 2.1 CM
TRICUSPID VALVE PEAK REGURGITATION VELOCITY: 2.29 M/S
WBC # BLD AUTO: 7.22 THOUSAND/UL (ref 4.31–10.16)

## 2023-09-05 PROCEDURE — 82948 REAGENT STRIP/BLOOD GLUCOSE: CPT

## 2023-09-05 PROCEDURE — 93306 TTE W/DOPPLER COMPLETE: CPT | Performed by: INTERNAL MEDICINE

## 2023-09-05 PROCEDURE — 80048 BASIC METABOLIC PNL TOTAL CA: CPT

## 2023-09-05 PROCEDURE — 85027 COMPLETE CBC AUTOMATED: CPT

## 2023-09-05 PROCEDURE — 93306 TTE W/DOPPLER COMPLETE: CPT

## 2023-09-05 PROCEDURE — 99232 SBSQ HOSP IP/OBS MODERATE 35: CPT | Performed by: PHYSICIAN ASSISTANT

## 2023-09-05 RX ORDER — POTASSIUM CHLORIDE 20 MEQ/1
40 TABLET, EXTENDED RELEASE ORAL ONCE
Status: COMPLETED | OUTPATIENT
Start: 2023-09-05 | End: 2023-09-05

## 2023-09-05 RX ORDER — KETOROLAC TROMETHAMINE 30 MG/ML
15 INJECTION, SOLUTION INTRAMUSCULAR; INTRAVENOUS EVERY 6 HOURS PRN
Status: DISCONTINUED | OUTPATIENT
Start: 2023-09-05 | End: 2023-09-06 | Stop reason: HOSPADM

## 2023-09-05 RX ORDER — METOPROLOL TARTRATE 50 MG/1
50 TABLET, FILM COATED ORAL EVERY 12 HOURS SCHEDULED
Status: DISCONTINUED | OUTPATIENT
Start: 2023-09-05 | End: 2023-09-06 | Stop reason: HOSPADM

## 2023-09-05 RX ORDER — MAGNESIUM HYDROXIDE/ALUMINUM HYDROXICE/SIMETHICONE 120; 1200; 1200 MG/30ML; MG/30ML; MG/30ML
30 SUSPENSION ORAL EVERY 4 HOURS PRN
Status: DISCONTINUED | OUTPATIENT
Start: 2023-09-05 | End: 2023-09-06 | Stop reason: HOSPADM

## 2023-09-05 RX ORDER — ACETAMINOPHEN 325 MG/1
650 TABLET ORAL EVERY 6 HOURS PRN
Status: DISCONTINUED | OUTPATIENT
Start: 2023-09-05 | End: 2023-09-06 | Stop reason: HOSPADM

## 2023-09-05 RX ADMIN — PANTOPRAZOLE SODIUM 40 MG: 40 TABLET, DELAYED RELEASE ORAL at 17:37

## 2023-09-05 RX ADMIN — NICOTINE 1 PATCH: 21 PATCH, EXTENDED RELEASE TRANSDERMAL at 08:09

## 2023-09-05 RX ADMIN — KETOROLAC TROMETHAMINE 15 MG: 30 INJECTION, SOLUTION INTRAMUSCULAR; INTRAVENOUS at 09:55

## 2023-09-05 RX ADMIN — METOCLOPRAMIDE 10 MG: 10 TABLET ORAL at 11:20

## 2023-09-05 RX ADMIN — LACTULOSE 20 G: 20 SOLUTION ORAL at 08:07

## 2023-09-05 RX ADMIN — TRAZODONE HYDROCHLORIDE 200 MG: 100 TABLET ORAL at 21:25

## 2023-09-05 RX ADMIN — METOCLOPRAMIDE 10 MG: 10 TABLET ORAL at 16:32

## 2023-09-05 RX ADMIN — DOCUSATE SODIUM 100 MG: 100 CAPSULE, LIQUID FILLED ORAL at 17:38

## 2023-09-05 RX ADMIN — SUCRALFATE 1 G: 1 TABLET ORAL at 21:26

## 2023-09-05 RX ADMIN — SUCRALFATE 1 G: 1 TABLET ORAL at 11:20

## 2023-09-05 RX ADMIN — MORPHINE SULFATE 2 MG: 2 INJECTION, SOLUTION INTRAMUSCULAR; INTRAVENOUS at 01:22

## 2023-09-05 RX ADMIN — SUCRALFATE 1 G: 1 TABLET ORAL at 16:31

## 2023-09-05 RX ADMIN — LEVOTHYROXINE SODIUM 25 MCG: 25 TABLET ORAL at 05:22

## 2023-09-05 RX ADMIN — GABAPENTIN 800 MG: 400 CAPSULE ORAL at 08:08

## 2023-09-05 RX ADMIN — ASPIRIN 81 MG 81 MG: 81 TABLET ORAL at 08:09

## 2023-09-05 RX ADMIN — POTASSIUM CHLORIDE 40 MEQ: 1500 TABLET, EXTENDED RELEASE ORAL at 09:24

## 2023-09-05 RX ADMIN — SUCRALFATE 1 G: 1 TABLET ORAL at 08:07

## 2023-09-05 RX ADMIN — INSULIN GLARGINE 12 UNITS: 100 INJECTION, SOLUTION SUBCUTANEOUS at 21:26

## 2023-09-05 RX ADMIN — POLYETHYLENE GLYCOL 3350 17 G: 17 POWDER, FOR SOLUTION ORAL at 08:07

## 2023-09-05 RX ADMIN — ACETAMINOPHEN 650 MG: 325 TABLET, FILM COATED ORAL at 08:08

## 2023-09-05 RX ADMIN — DOCUSATE SODIUM 100 MG: 100 CAPSULE, LIQUID FILLED ORAL at 08:08

## 2023-09-05 RX ADMIN — KETOROLAC TROMETHAMINE 15 MG: 30 INJECTION, SOLUTION INTRAMUSCULAR; INTRAVENOUS at 21:29

## 2023-09-05 RX ADMIN — LACTULOSE 20 G: 20 SOLUTION ORAL at 16:31

## 2023-09-05 RX ADMIN — GABAPENTIN 800 MG: 400 CAPSULE ORAL at 16:31

## 2023-09-05 RX ADMIN — MORPHINE SULFATE 2 MG: 2 INJECTION, SOLUTION INTRAMUSCULAR; INTRAVENOUS at 11:20

## 2023-09-05 RX ADMIN — ATORVASTATIN CALCIUM 40 MG: 40 TABLET, FILM COATED ORAL at 17:37

## 2023-09-05 RX ADMIN — MORPHINE SULFATE 2 MG: 2 INJECTION, SOLUTION INTRAMUSCULAR; INTRAVENOUS at 05:24

## 2023-09-05 RX ADMIN — GABAPENTIN 800 MG: 400 CAPSULE ORAL at 21:25

## 2023-09-05 RX ADMIN — PANTOPRAZOLE SODIUM 40 MG: 40 TABLET, DELAYED RELEASE ORAL at 08:08

## 2023-09-05 RX ADMIN — METOPROLOL TARTRATE 50 MG: 50 TABLET, FILM COATED ORAL at 21:31

## 2023-09-05 RX ADMIN — ESCITALOPRAM OXALATE 10 MG: 10 TABLET ORAL at 08:09

## 2023-09-05 RX ADMIN — ARIPIPRAZOLE 10 MG: 5 TABLET ORAL at 08:08

## 2023-09-05 NOTE — ASSESSMENT & PLAN NOTE
· Initially with permissive HTN per stroke pathway  · Goal normotension, no stroke on MRI  · Resume home Lopressor tonight  · Continue holding home HCTZ for now Labs/Imaging Studies

## 2023-09-05 NOTE — PLAN OF CARE
Problem: PAIN - ADULT  Goal: Verbalizes/displays adequate comfort level or baseline comfort level  Description: Interventions:  - Encourage patient to monitor pain and request assistance  - Assess pain using appropriate pain scale  - Administer analgesics based on type and severity of pain and evaluate response  - Implement non-pharmacological measures as appropriate and evaluate response  - Consider cultural and social influences on pain and pain management  - Notify physician/advanced practitioner if interventions unsuccessful or patient reports new pain  Outcome: Progressing     Problem: INFECTION - ADULT  Goal: Absence of fever/infection during neutropenic period  Description: INTERVENTIONS:  - Monitor WBC    Outcome: Progressing     Problem: SAFETY ADULT  Goal: Patient will remain free of falls  Description: INTERVENTIONS:  - Educate patient/family on patient safety including physical limitations  - Instruct patient to call for assistance with activity   - Consult OT/PT to assist with strengthening/mobility   - Keep Call bell within reach  - Keep bed low and locked with side rails adjusted as appropriate  - Keep care items and personal belongings within reach  - Initiate and maintain comfort rounds  - Make Fall Risk Sign visible to staff  - Offer Toileting every Hours, in advance of need  - Initiate/Maintain alarm  - Obtain necessary fall risk management equipment:   - Apply yellow socks and bracelet for high fall risk patients  - Consider moving patient to room near nurses station  Outcome: Progressing

## 2023-09-05 NOTE — DISCHARGE SUMMARY
[address]  Discharge- Diamond Pringle 1965, 56 y.o. female MRN: [id number]  Unit/Bed#: -01 Encounter: [id number]  Primary Care Provider: Ursula Medina [address] [place] [address]   Date and time admitted to hospital: 9/2/2023 11:40 PM     * Abdominal pain  Assessment & Plan  Presented with worsening abdominal pain with nausea, bloating for the past week. Known history of gastroparesis, symptoms likely related with longstanding use of likely constipating medications. · CT a/p: Stomach is distended, consistent with known diagnosis of delayed gastric emptying but no acute pathology. · GI following:  · Continue regular use of MiraLAX/bowel regimen, minimize use of opioids as this can exacerbate dysmotility  · Consider checking KUB if abdominal distention worsens  · Continue scheduled Carafate, Reglan, lactulose  · PRN Tylenol, IV Toradol. PRN IV morphine for breakthrough pain, limit opioids as able  · S/p enema overnight (9/4), successful with multiple BMs since and reduction in abdominal distention  · Abdominal pain improved, tolerated diet and can continue outpatient follow-up with GI at discharge    Dysarthria  Assessment & Plan  Patient's significant other reported slurred speech, facial asymmetry for the past few days PTA, however patient denies. · Likely 2/2 polypharmacy, sedating medications  · Chronically on high-dose gabapentin 800mg TID, Klonopin PRN  · CTA head/neck, MRI brain with no evidence of acute ischemia  · Lipid panel: HDL 41, . A1c: 5.5. · Lyme total antibody: Negative  · Echo: EF 61%, normal systolic and diastolic function.   · Neurology consult:  · Low suspicion for acute vascular event given duration of symptoms + negative MRI  · Would continue ASA 81 mg daily given risk factors  · Improved, no concern for acute ischemic events    Diabetes mellitus type 2 in obese [facility])  Assessment & Plan  Lab Results   Component Value Date    HGBA1C 5.5 09/03/2023       Recent Labs 09/05/23  1520 09/05/23  2047 09/05/23  2125 09/06/23  0708   POCGLU 103 79 104 124       Blood Sugar Average: Last 72 hrs:  (P) [id number]     · Updated A1c 5.5, well controlled  · Home Medication: Lantus 25U qHS, Metformin 1000mg BID, Ozempic 1mg/week. · Hold home metformin, Ozempic while inpatient  · Continue home Lantus dosing, SSI coverage with Accu-Cheks  · Hypoglycemia protocol    Tobacco abuse  Assessment & Plan  · Educated on smoking Cessation  · NRT while admitted    Bipolar 1 disorder, depressed ([address])  Assessment & Plan  · Continue home Trazodone, Abilify    Generalized anxiety disorder  Assessment & Plan  · Continue home Lexapro    Essential hypertension  Assessment & Plan  · Initially with permissive HTN per stroke pathway  · Goal normotension, no stroke on MRI  · Resumed home Lopressor, can resume home HCTZ at discharge    Medical Problems     Resolved Problems  Date Reviewed: 9/6/2023   None       Discharging Physician / Practitioner: Calvin Jim PA-C  PCP: Carolyn Mccann, [address] [place] [address]  Admission Date:   Admission Orders (From admission, onward)     Ordered        09/04/23 1448  Inpatient Admission  Once            09/03/23 0235  Place in Observation  Once                      Discharge Date: 09/06/23    Consultations During Hospital Stay:  · GI  · Neurology  · PT/OT/SLP    Procedures Performed:   · None    Significant Findings / Test Results:   · CT a/p: Stomach is distended consistent with the known diagnosis of delayed gastric emptying. No acute pathology visualized on CT of the abdomen and pelvis with IV without oral contrast.   · CTA head/neck: No acute intracranial abnormality. No flow-limiting stenosis or large vessel occlusion within the major vessels of the [place]. No significant stenosis of the cervical carotid or vertebral arteries. · MRI brain: No MR evidence of acute ischemia.     Incidental Findings:   · None    Test Results Pending at Discharge (will require follow up):   · None     Outpatient Tests Requested:  · Follow-up with GI outpatient for ongoing management of gastroparesis  · Follow-up with PCP for management chronic conditions    Complications: None    Reason for Admission: Abdominal pain, abdominal distention    Hospital Course:   Nadine Neville is a 56 y.o. female patient, PMH gastroparesis, HTN, JENNIFER, bipolar 1 disorder, tobacco abuse, T2DM, who originally presented to the hospital on 9/2/2023 due to abdominal pain, likely in setting of gastroparesis. On arrival to ED, CT abdomen consistent with delayed gastric emptying, no signs of obstruction or acute pathology. GI evaluated patient, treated gastroparesis/constipation with bowel regimen, pain management and supportive care with clinical improvement. Patient tolerated transition to low residue diet, recommend close outpatient follow-up with GI for ongoing gastroparesis management. Additionally, patient was reported to have slurred speech with facial asymmetry for the past few days prior to arrival, however MRI brain and CTA head/neck negative for acute ischemia. Neurology evaluated patient, stroke work-up not consistent with acute vascular event, possible that symptoms could be from polypharmacy with chronic high-dose gabapentin, Klonopin use. Recommend continuing daily aspirin with known risk factors, follow-up with PCP for ongoing management. Please see above list of diagnoses and related plan for additional information. Condition at Discharge: stable    Discharge Day Visit / Exam:   Subjective: Patient is seen at bedside this a.m., ambulating in hallways. Noted significant improvement in abdominal pain, although still with distention after breakfast this morning. Patient otherwise states she feels ready for discharge home.   Vitals: Blood Pressure: 163/89 (09/06/23 0732)  Pulse: 59 (09/06/23 0732)  Temperature: (!) 97.1 °F (36.2 °C) (09/06/23 0732)  Temp Source: Tympanic (09/06/23 0732)  Respirations: 18 (09/06/23 0732)  Height: 5' 3" (160 cm) (09/05/23 1124)  Weight - Scale: 72.1 kg (159 lb) (09/05/23 1124)  SpO2: 100 % (09/06/23 0732)  Exam:   Physical Exam  Constitutional:       General: She is not in acute distress. Appearance: She is not ill-appearing, toxic-appearing or diaphoretic. Cardiovascular:      Rate and Rhythm: Normal rate and regular rhythm. Pulses: Normal pulses. Heart sounds: Normal heart sounds. Pulmonary:      Effort: Pulmonary effort is normal. No respiratory distress. Breath sounds: Normal breath sounds. Abdominal:      General: Bowel sounds are normal. There is distension. Palpations: Abdomen is soft. Tenderness: There is no abdominal tenderness. There is no guarding. Comments: Mild abdominal distention   Musculoskeletal:         General: No swelling or tenderness. Skin:     General: Skin is warm. Neurological:      General: No focal deficit present. Mental Status: She is alert. Psychiatric:         Mood and Affect: Mood normal.         Behavior: Behavior normal.         Discussion with Family: Patient declined call to . Discharge instructions/Information to patient and family:   See after visit summary for information provided to patient and family. Provisions for Follow-Up Care:  See after visit summary for information related to follow-up care and any pertinent home health orders. Disposition:   Home    Planned Readmission: None     Discharge Statement:  I spent 55 minutes discharging the patient. This time was spent on the day of discharge. I had direct contact with the patient on the day of discharge. Greater than 50% of the total time was spent examining patient, answering all patient questions, arranging and discussing plan of care with patient as well as directly providing post-discharge instructions. Additional time then spent on discharge activities.     Discharge Medications:  See after visit summary for reconciled discharge medications provided to patient and/or family.       **Please Note: This note may have been constructed using a voice recognition system**

## 2023-09-05 NOTE — ASSESSMENT & PLAN NOTE
Patient's significant other reported slurred speech, facial asymmetry for the past few days PTA, however patient denies. · Likely 2/2 polypharmacy, sedating medications  · Chronically on high-dose gabapentin 800mg TID, Klonopin PRN  · CTA head/neck, MRI brain with no evidence of acute ischemia  · Lipid panel: HDL 41, . A1c results pending.   · Echo today, await final results  · Neurology consult:  · Low suspicion for acute vascular event given duration of symptoms + negative MRI  · Would continue ASA 81 mg daily given risk factors  · Improved, no concern for acute ischemic events

## 2023-09-05 NOTE — PROGRESS NOTES
[address]  Progress Note  Name: Marylu Zuluaga  MRN: [id number]  Unit/Bed#: -01 I Date of Admission: 9/2/2023   Date of Service: 9/5/2023 I Hospital Day: 1    Assessment/Plan   * Abdominal pain  Assessment & Plan  Presented with worsening abdominal pain with nausea, bloating for the past week. Known history of gastroparesis, symptoms likely related with longstanding use of likely constipating medications. · CT a/p: Stomach is distended, consistent with known diagnosis of delayed gastric emptying but no acute pathology. · GI following:  · Continue regular use of MiraLAX/bowel regimen, minimize use of opioids as this can exacerbate dysmotility  · Consider checking KUB if abdominal distention worsens  · Continue scheduled Carafate, Reglan, lactulose  · PRN Tylenol, IV Toradol. PRN IV morphine for breakthrough pain, limit opioids as able  · S/p enema overnight (9/4), successful with multiple BMs since and reduction in abdominal distention  · Advance to low residue/low fiber diet, hopefully plan for discharge tomorrow if improved    Dysarthria  Assessment & Plan  Patient's significant other reported slurred speech, facial asymmetry for the past few days PTA, however patient denies. · Likely 2/2 polypharmacy, sedating medications  · Chronically on high-dose gabapentin 800mg TID, Klonopin PRN  · CTA head/neck, MRI brain with no evidence of acute ischemia  · Lipid panel: HDL 41, . A1c results pending.   · Echo today, await final results  · Neurology consult:  · Low suspicion for acute vascular event given duration of symptoms + negative MRI  · Would continue ASA 81 mg daily given risk factors  · Improved, no concern for acute ischemic events    Diabetes mellitus type 2 in obese [facility])  Assessment & Plan  Lab Results   Component Value Date    HGBA1C 5.5 03/17/2023       Recent Labs     09/04/23  2007 09/05/23  0707 09/05/23  0831 09/05/23  1102   POCGLU 122 63* 137 97       Blood Sugar Average: Last 72 hrs:  (P) 210.6042745525656804     · Updated A1c pending, 5.5 in March  · Home Medication: Lantus 25U qHS, Metformin 1000mg BID, Ozempic 1mg/week. · Hold home metformin, Ozempic while inpatient  · Continue home Lantus dosing, SSI coverage with Accu-Cheks  · Hypoglycemia protocol    Tobacco abuse  Assessment & Plan  · Educated on smoking Cessation  · NRT while admitted    Bipolar 1 disorder, depressed ([address])  Assessment & Plan  · Continue home Trazodone, Abilify    Generalized anxiety disorder  Assessment & Plan  · Continue home Lexapro    Essential hypertension  Assessment & Plan  · Initially with permissive HTN per stroke pathway  · Goal normotension, no stroke on MRI  · Resume home Lopressor tonight  · Continue holding home HCTZ for now           VTE Pharmacologic Prophylaxis: VTE Score: 2 Low Risk (Score 0-2) - Encourage Ambulation. Patient Centered Rounds: I performed bedside rounds with nursing staff today. Discussions with Specialists or Other Care Team Provider: GI, case management    Education and Discussions with Family / Patient: Patient declined call to . Total Time Spent on Date of Encounter in care of patient: 45 minutes This time was spent on one or more of the following: performing physical exam; counseling and coordination of care; obtaining or reviewing history; documenting in the medical record; reviewing/ordering tests, medications or procedures; communicating with other healthcare professionals and discussing with patient's family/caregivers. Current Length of Stay: 1 day(s)  Current Patient Status: Inpatient   Certification Statement: The patient will continue to require additional inpatient hospital stay due to Pain control, advancing diet  Discharge Plan: Anticipate discharge tomorrow to home.     Code Status: Level 1 - Full Code    Subjective:   Patient is seen at bedside this a.m., reports having around 4 BMs since enema yesterday evening, notes significant reduction and abdominal bloating/distention but still with severe abdominal pain. Denies nausea/vomiting, tolerated full liquid diet well. Of note, patient expressed frustration with ongoing gastroparesis issues, motivated to improve and was seen ambulating in halls throughout the morning. Objective:     Vitals:   Temp (24hrs), Av.2 °F (36.8 °C), Min:97.9 °F (36.6 °C), Max:98.6 °F (37 °C)    Temp:  [97.9 °F (36.6 °C)-98.6 °F (37 °C)] 98.6 °F (37 °C)  HR:  [62-70] 70  Resp:  [16-20] 20  BP: (129-143)/(67-77) 136/77  SpO2:  [95 %-96 %] 96 %  Body mass index is 28.17 kg/m². Input and Output Summary (last 24 hours):   No intake or output data in the 24 hours ending 23 1406    Physical Exam:   Physical Exam  Constitutional:       General: She is not in acute distress. Appearance: She is not ill-appearing, toxic-appearing or diaphoretic. Cardiovascular:      Rate and Rhythm: Normal rate and regular rhythm. Pulses: Normal pulses. Heart sounds: Normal heart sounds. Pulmonary:      Effort: Pulmonary effort is normal. No respiratory distress. Breath sounds: Normal breath sounds. Abdominal:      General: Bowel sounds are normal. There is distension. Tenderness: There is abdominal tenderness. There is no guarding. Comments: Moderate abdominal distention, general tenderness to palpation. Musculoskeletal:         General: No swelling or tenderness. Skin:     General: Skin is warm. Neurological:      General: No focal deficit present. Mental Status: She is alert and oriented to person, place, and time. Comments: No facial droop, no notable dysarthria. Psychiatric:         Behavior: Behavior normal.      Comments: Tearful, expressing frustration with gastroparesis.           Additional Data:     Labs:  Results from last 7 days   Lab Units 23  0519 23  0014   WBC Thousand/uL 7.22 10.45*   HEMOGLOBIN g/dL 11.0* 13.1   HEMATOCRIT % 34.2* 40.6 PLATELETS Thousands/uL 312 381   NEUTROS PCT %  --  54   LYMPHS PCT %  --  36   MONOS PCT %  --  8   EOS PCT %  --  2     Results from last 7 days   Lab Units 09/05/23  0519 09/03/23  0538 09/03/23  0014   SODIUM mmol/L 140   < > 139   POTASSIUM mmol/L 3.1*   < > 3.4*   CHLORIDE mmol/L 105   < > 100   CO2 mmol/L 29   < > 31   BUN mg/dL 7   < > 9   CREATININE mg/dL 0.64   < > 0.79   ANION GAP mmol/L 6   < > 8   CALCIUM mg/dL 8.7   < > 10.0   ALBUMIN g/dL  --   --  4.8   TOTAL BILIRUBIN mg/dL  --   --  0.34   ALK PHOS U/L  --   --  92   ALT U/L  --   --  14   AST U/L  --   --  14   GLUCOSE RANDOM mg/dL 145*   < > 135    < > = values in this interval not displayed.      Results from last 7 days   Lab Units 09/03/23  0014   INR  0.80*     Results from last 7 days   Lab Units 09/05/23  1102 09/05/23  0831 09/05/23  0707 09/04/23  2007 09/04/23  1613 09/04/23  1107 09/04/23  0712 09/03/23  2123 09/03/23  1623 09/03/23  1108 09/03/23  0902 09/02/23  2345   POC GLUCOSE mg/dl 97 137 63* 122 115 147* 120 94 114 101 113 164*               Lines/Drains:  Invasive Devices     Peripheral Intravenous Line  Duration           Peripheral IV 09/04/23 Left;Ventral (anterior) Forearm 1 day                      Imaging: Reviewed radiology reports from this admission including: abdominal/pelvic CT    Recent Cultures (last 7 days):         Last 24 Hours Medication List:   Current Facility-Administered Medications   Medication Dose Route Frequency Provider Last Rate   • acetaminophen  650 mg Oral Q6H PRN Georgia Blum PA-C     • aluminum-magnesium hydroxide-simethicone  30 mL Oral Q4H PRN Georgia Blum PA-C     • ARIPiprazole  10 mg Oral Daily Edgard Marcum MD     • aspirin  81 mg Oral Daily Edgard Marcum MD     • atorvastatin  40 mg Oral QPM Edgard Marcum MD     • clonazePAM  0.5 mg Oral BID PRN Edgard Marcum MD     • docusate sodium  100 mg Oral BID Edgard Marcum MD     • [START ON 9/8/2023] ergocalciferol 50,000 Units Oral Weekly Edgard Mcginnis MD     • escitalopram  10 mg Oral Daily Edgard Mcginnis MD     • gabapentin  800 mg Oral TID Edgard Mcginnis MD     • insulin glargine  25 Units Subcutaneous HS Edgard Mcginnis MD     • insulin lispro  1-5 Units Subcutaneous TID AC Edgard Mcginnis MD     • insulin lispro  1-5 Units Subcutaneous HS Edgard Mcginnis MD     • ketorolac  15 mg Intravenous Q6H PRN Armaan Cooley PA-C     • lactulose  20 g Oral TID Karthikeyan Schneider MD     • levothyroxine  25 mcg Oral Early Morning Edgard Mcginnis MD     • metoclopramide  10 mg Oral BID before lunch/dinner Edgard Mcginnis MD     • metoprolol tartrate  50 mg Oral Q12H [address] [place] NASRIN AdamsC     • morphine injection  2 mg Intravenous Q6H PRN Armaan Cooley PA-C     • nicotine  1 patch Transdermal Daily Edgard Mcginnis MD     • ondansetron  4 mg Intravenous Q6H PRN Edgard Mcginnis MD     • pantoprazole  40 mg Oral BID Edgard Mcginnis MD     • polyethylene glycol  17 g Oral BID Edgard Mcginnis MD     • sucralfate  1 g Oral 4x Daily (AC & HS) Edgard Mcginnis MD     • traZODone  200 mg Oral HS Edgard Mcginnis MD          Today, Patient Was Seen By: Armaan Cooley PA-C    **Please Note: This note may have been constructed using a voice recognition system. **

## 2023-09-05 NOTE — ASSESSMENT & PLAN NOTE
Lab Results   Component Value Date    HGBA1C 5.5 03/17/2023       Recent Labs     09/04/23 2007 09/05/23  0707 09/05/23  0831 09/05/23  1102   POCGLU 122 63* 137 97       Blood Sugar Average: Last 72 hrs:  (P) 705.2805938546930903     · Updated A1c pending, 5.5 in March  · Home Medication: Lantus 25U qHS, Metformin 1000mg BID, Ozempic 1mg/week.   · Hold home metformin, Ozempic while inpatient  · Continue home Lantus dosing, SSI coverage with Accu-Cheks  · Hypoglycemia protocol

## 2023-09-05 NOTE — PHYSICAL THERAPY NOTE
Physical Therapy Screen    Patient Name: Clydene Habermann    QBLSW'P Date: 9/5/2023     Problem List  Principal Problem:    Abdominal pain  Active Problems:    Essential hypertension    Generalized anxiety disorder    Bipolar 1 disorder, depressed ([address])    Tobacco abuse    Diabetes mellitus type 2 in obese [facility])    Dysarthria       Past Medical History  Past Medical History:   Diagnosis Date    Addiction to drug ([address])     Adjustment disorder     Alcohol abuse     Alcoholism ([address])     Anxiety     Bipolar disorder ([address])     Bowel obstruction ([address])     Depression     Diabetes type 2, controlled ([address])     Disease of thyroid gland     Gastritis     Head injury     Heart palpitations     Hyperlipidemia     Hypertension     Hypothyroidism     Psychiatric illness     PTSD (post-traumatic stress disorder)     Seizure ([address])     Seizures ([address])     Sleep difficulties     Substance abuse ([address])     Suicide attempt ([address])     Withdrawal symptoms, drug or narcotic ([address])         Past Surgical History  Past Surgical History:   Procedure Laterality Date    ABDOMINAL SURGERY      APPENDECTOMY      BOWEL RESECTION      CHOLECYSTECTOMY      ESOPHAGOGASTRODUODENOSCOPY N/A 05/18/2018    Procedure: ESOPHAGOGASTRODUODENOSCOPY (EGD) with bx;  Surgeon: Shayla Burrell DO;  Location: [facility]; Service: Gastroenterology    HYSTERECTOMY      KNEE SURGERY Bilateral 1991 09/05/23 0900   Note Type   Note type Screen   Cancel Reasons Other   Additional Comments PT order received and chart reviewed. Pt reports no significant physical or functional changes from his baseline. Nursing and patient report she is up independently ad fifi in room. This therapist has observed the patient walking the hallway without AD or difficulty while multi-tasking. Denies need for acute inpatient PT at this time. Adviced patient to inquire with nursing/physican if preferences or needs change.

## 2023-09-05 NOTE — CASE MANAGEMENT
Case Management Assessment    Patient name [facility] /-63 MRN [id number]  : 1965 Date 2023       Current Admission Date: 2023  Current Admission Diagnosis:Abdominal pain   Patient Active Problem List    Diagnosis Date Noted   • Dysarthria 2023   • Migraine without aura and without status migrainosus, not intractable 2023   • Long-term use of high-risk medication 2023   • Pulmonary emphysema, unspecified emphysema type ([address]) 2023   • Screening for blood or protein in urine 2022   • Chronic cough 2022   • Bronchitis with acute wheezing 2022   • Cigarette smoker 2022   • Tachypnea 2022   • Hypertensive urgency 2022   • Tachycardia 2022   • Diabetes mellitus type 2 in obese ([address]) 2021   • Primary hypertension 2021   • Hypothyroidism 2021   • Vitamin D deficiency 2021   • Tiredness 2021   • Obesity, morbid ([address]) 2021   • Chest pressure 2021   • Increased anion gap metabolic acidosis    • STI (sexually transmitted infection) 2021   • Gastroesophageal reflux disease without esophagitis 2020   • Toxic encephalopathy 2020   • Bipolar 1 disorder, depressed ([address]) 2020   • Benzodiazepine dependence, continuous ([address]) 2020   • Non compliance w medication regimen 2020   • Acquired hypothyroidism 2020   • Hypokalemia 2020   • Hyperlipemia 2020   • Transaminitis 2020   • Cognitive dysfunction in bipolar disorder ([address]) 2020   • Medical clearance for psychiatric admission 2020   • Tobacco abuse 2020   • Bipolar disorder current episode depressed ([address]) 2020   • Elevated lactic acid level 2019   • Post-traumatic stress disorder, chronic 2019   • Alcohol abuse, in remission 2019   • Abdominal pain 2018   • Essential hypertension 2018   • Generalized anxiety disorder 12/14/2017      LOS (days): 1  Geometric Mean LOS (GMLOS) (days):   Days to GMLOS:     OBJECTIVE:    Risk of Unplanned Readmission Score: 22.15         Current admission status: Inpatient       Preferred Pharmacy:   [facility] [address], [address] [address]  Phone: [phone] Fax: [phone]    Primary Care Provider: YONG Breen    Primary Insurance: [facility]  Secondary Insurance:     ASSESSMENT:  Narendra Proxies    There are no active Health Care Proxies on file. Readmission Root Cause  30 Day Readmission: No    Patient Information  Admitted from[de-identified] Home  Mental Status: Alert  During Assessment patient was accompanied by: Spouse  Assessment information provided by[de-identified] Patient  Primary Caregiver: Self  Support Systems: Self, Spouse/significant other, Children  Home entry access options.  Select all that apply.: No steps to enter home  Type of Current Residence: 2 story home  Upon entering residence, is there a bedroom on the main floor (no further steps)?: No  A bedroom is located on the following floor levels of residence (select all that apply):: 2nd Floor  Upon entering residence, is there a bathroom on the main floor (no further steps)?: No  Indicate which floors of current residence have a bathroom (select all the apply):: 2nd Floor  Number of steps to 2nd floor from main floor: One Flight  In the last 12 months, was there a time when you were not able to pay the mortgage or rent on time?: No  In the last 12 months, was there a time when you did not have a steady place to sleep or slept in a shelter (including now)?: No  Homeless/housing insecurity resource given?: N/A  Living Arrangements: Lives w/ Spouse/significant other    Activities of Daily Living Prior to Admission  Functional Status: Independent  Completes ADLs independently?: Yes  Ambulates independently?: Yes  Does patient use assisted devices?: No  Does patient currently own DME?: No  Does patient have a history of Outpatient Therapy (PT/OT)?: No  Does the patient have a history of Short-Term Rehab?: No  Does patient have a history of HHC?: No  Does patient currently have [address]?: No         Patient Information Continued  Income Source: Pension/[employer]  Does patient have prescription coverage?: Yes  Within the past 12 months, you worried that your food would run out before you got the money to buy more.: Never true  Within the past 12 months, the food you bought just didn't last and you didn't have money to get more.: Never true  Food insecurity resource given?: N/A  Does patient receive dialysis treatments?: No  Does patient have a history of substance abuse?: No  Does patient have a history of Mental Health Diagnosis?: Yes  Is patient receiving treatment for mental health?: No. Patient declined treatment information.   Has patient received inpatient treatment related to mental health in the last 2 years?: Yes ([facility])         [employer] of Transport to Appts[de-identified] Family transport  In the past 12 months, has lack of transportation kept you from medical appointments or from getting medications?: No  In the past 12 months, has lack of transportation kept you from meetings, work, or from getting things needed for daily living?: No  Was application for public transport provided?: N/A

## 2023-09-05 NOTE — UTILIZATION REVIEW
Date: 9/5   Day 3: Has surpassed a 2nd midnight with active treatments and services, which include continud work up and tx for dysarthria with facial droop and abd pain.

## 2023-09-05 NOTE — ASSESSMENT & PLAN NOTE
Presented with worsening abdominal pain with nausea, bloating for the past week. Known history of gastroparesis, symptoms likely related with longstanding use of likely constipating medications. · CT a/p: Stomach is distended, consistent with known diagnosis of delayed gastric emptying but no acute pathology. · GI following:  · Continue regular use of MiraLAX/bowel regimen, minimize use of opioids as this can exacerbate dysmotility  · Consider checking KUB if abdominal distention worsens  · Continue scheduled Carafate, Reglan, lactulose  · PRN Tylenol, IV Toradol.  PRN IV morphine for breakthrough pain, limit opioids as able  · S/p enema overnight (9/4), successful with multiple BMs since and reduction in abdominal distention  · Advance to low residue/low fiber diet, hopefully plan for discharge tomorrow if improved

## 2023-09-05 NOTE — OCCUPATIONAL THERAPY NOTE
Occupational Therapy Screen Note     Patient Name: Lorna Room  QFUPV'Z Date: 9/5/2023 09/05/23 1136   OT Last Visit   OT Visit Date 09/05/23   Note Type   Note type Screen   Additional Comments OT consult received and chart reveiwed. Pt observed by this therapist to be independently ambulating ad fifi t/o hospitalization. Spoke with RN Daljit Chavez, who also reports pt to be independent with self-care/ADL tasks. No futher acute OT needs. Will d/c pt from [address] [place] [address]. Please reconsult with any new, acute OT needs.      fEfie Rater, [address] [facility] OTR/L   [place] Licensure# [id number]

## 2023-09-06 VITALS
SYSTOLIC BLOOD PRESSURE: 163 MMHG | WEIGHT: 159 LBS | HEIGHT: 63 IN | TEMPERATURE: 97.1 F | RESPIRATION RATE: 18 BRPM | BODY MASS INDEX: 28.17 KG/M2 | DIASTOLIC BLOOD PRESSURE: 89 MMHG | HEART RATE: 59 BPM | OXYGEN SATURATION: 100 %

## 2023-09-06 LAB
ANION GAP SERPL CALCULATED.3IONS-SCNC: 4 MMOL/L
BUN SERPL-MCNC: 8 MG/DL (ref 5–25)
CALCIUM SERPL-MCNC: 9.3 MG/DL (ref 8.4–10.2)
CHLORIDE SERPL-SCNC: 106 MMOL/L (ref 96–108)
CO2 SERPL-SCNC: 29 MMOL/L (ref 21–32)
CREAT SERPL-MCNC: 0.68 MG/DL (ref 0.6–1.3)
ERYTHROCYTE [DISTWIDTH] IN BLOOD BY AUTOMATED COUNT: 12.3 % (ref 11.6–15.1)
EST. AVERAGE GLUCOSE BLD GHB EST-MCNC: 111 MG/DL
GFR SERPL CREATININE-BSD FRML MDRD: 98 ML/MIN/1.73SQ M
GLUCOSE SERPL-MCNC: 109 MG/DL (ref 65–140)
GLUCOSE SERPL-MCNC: 124 MG/DL (ref 65–140)
GLUCOSE SERPL-MCNC: 164 MG/DL (ref 65–140)
HBA1C MFR BLD: 5.5 %
HCT VFR BLD AUTO: 39.3 % (ref 34.8–46.1)
HGB BLD-MCNC: 12.5 G/DL (ref 11.5–15.4)
MCH RBC QN AUTO: 28.9 PG (ref 26.8–34.3)
MCHC RBC AUTO-ENTMCNC: 31.8 G/DL (ref 31.4–37.4)
MCV RBC AUTO: 91 FL (ref 82–98)
PLATELET # BLD AUTO: 334 THOUSANDS/UL (ref 149–390)
PMV BLD AUTO: 9.8 FL (ref 8.9–12.7)
POTASSIUM SERPL-SCNC: 4 MMOL/L (ref 3.5–5.3)
RBC # BLD AUTO: 4.32 MILLION/UL (ref 3.81–5.12)
SODIUM SERPL-SCNC: 139 MMOL/L (ref 135–147)
WBC # BLD AUTO: 6.87 THOUSAND/UL (ref 4.31–10.16)

## 2023-09-06 PROCEDURE — 80048 BASIC METABOLIC PNL TOTAL CA: CPT | Performed by: PHYSICIAN ASSISTANT

## 2023-09-06 PROCEDURE — 99239 HOSP IP/OBS DSCHRG MGMT >30: CPT | Performed by: PHYSICIAN ASSISTANT

## 2023-09-06 PROCEDURE — 82948 REAGENT STRIP/BLOOD GLUCOSE: CPT

## 2023-09-06 PROCEDURE — 85027 COMPLETE CBC AUTOMATED: CPT | Performed by: PHYSICIAN ASSISTANT

## 2023-09-06 RX ORDER — MAGNESIUM HYDROXIDE/ALUMINUM HYDROXICE/SIMETHICONE 120; 1200; 1200 MG/30ML; MG/30ML; MG/30ML
30 SUSPENSION ORAL EVERY 4 HOURS PRN
Qty: 355 ML | Refills: 0 | Status: SHIPPED | OUTPATIENT
Start: 2023-09-06

## 2023-09-06 RX ORDER — POLYETHYLENE GLYCOL 3350 17 G/17G
17 POWDER, FOR SOLUTION ORAL DAILY
Qty: 30 EACH | Refills: 0 | Status: SHIPPED | OUTPATIENT
Start: 2023-09-06

## 2023-09-06 RX ORDER — ASPIRIN 81 MG/1
81 TABLET, CHEWABLE ORAL DAILY
Qty: 30 TABLET | Refills: 0 | Status: SHIPPED | OUTPATIENT
Start: 2023-09-07 | End: 2023-10-07

## 2023-09-06 RX ADMIN — GABAPENTIN 800 MG: 400 CAPSULE ORAL at 08:35

## 2023-09-06 RX ADMIN — MORPHINE SULFATE 2 MG: 2 INJECTION, SOLUTION INTRAMUSCULAR; INTRAVENOUS at 04:38

## 2023-09-06 RX ADMIN — NICOTINE 1 PATCH: 21 PATCH, EXTENDED RELEASE TRANSDERMAL at 08:35

## 2023-09-06 RX ADMIN — KETOROLAC TROMETHAMINE 15 MG: 30 INJECTION, SOLUTION INTRAMUSCULAR; INTRAVENOUS at 04:05

## 2023-09-06 RX ADMIN — ASPIRIN 81 MG 81 MG: 81 TABLET ORAL at 08:35

## 2023-09-06 RX ADMIN — LACTULOSE 20 G: 20 SOLUTION ORAL at 08:35

## 2023-09-06 RX ADMIN — DOCUSATE SODIUM 100 MG: 100 CAPSULE, LIQUID FILLED ORAL at 08:35

## 2023-09-06 RX ADMIN — PANTOPRAZOLE SODIUM 40 MG: 40 TABLET, DELAYED RELEASE ORAL at 08:35

## 2023-09-06 RX ADMIN — SUCRALFATE 1 G: 1 TABLET ORAL at 06:07

## 2023-09-06 RX ADMIN — LEVOTHYROXINE SODIUM 25 MCG: 25 TABLET ORAL at 04:51

## 2023-09-06 RX ADMIN — METOPROLOL TARTRATE 50 MG: 50 TABLET, FILM COATED ORAL at 08:35

## 2023-09-06 RX ADMIN — ESCITALOPRAM OXALATE 10 MG: 10 TABLET ORAL at 08:35

## 2023-09-06 RX ADMIN — ARIPIPRAZOLE 10 MG: 5 TABLET ORAL at 08:35

## 2023-09-06 RX ADMIN — POLYETHYLENE GLYCOL 3350 17 G: 17 POWDER, FOR SOLUTION ORAL at 08:35

## 2023-09-06 NOTE — ASSESSMENT & PLAN NOTE
Presented with worsening abdominal pain with nausea, bloating for the past week. Known history of gastroparesis, symptoms likely related with longstanding use of likely constipating medications. · CT a/p: Stomach is distended, consistent with known diagnosis of delayed gastric emptying but no acute pathology. · GI following:  · Continue regular use of MiraLAX/bowel regimen, minimize use of opioids as this can exacerbate dysmotility  · Consider checking KUB if abdominal distention worsens  · Continue scheduled Carafate, Reglan, lactulose  · PRN Tylenol, IV Toradol.  PRN IV morphine for breakthrough pain, limit opioids as able  · S/p enema overnight (9/4), successful with multiple BMs since and reduction in abdominal distention  · Abdominal pain improved, tolerated diet and can continue outpatient follow-up with GI at discharge

## 2023-09-06 NOTE — ASSESSMENT & PLAN NOTE
· Initially with permissive HTN per stroke pathway  · Goal normotension, no stroke on MRI  · Resumed home Lopressor, can resume home HCTZ at discharge

## 2023-09-06 NOTE — ASSESSMENT & PLAN NOTE
Patient's significant other reported slurred speech, facial asymmetry for the past few days PTA, however patient denies. · Likely 2/2 polypharmacy, sedating medications  · Chronically on high-dose gabapentin 800mg TID, Klonopin PRN  · CTA head/neck, MRI brain with no evidence of acute ischemia  · Lipid panel: HDL 41, . A1c: 5.5. · Lyme total antibody: Negative  · Echo: EF 51%, normal systolic and diastolic function.   · Neurology consult:  · Low suspicion for acute vascular event given duration of symptoms + negative MRI  · Would continue ASA 81 mg daily given risk factors  · Improved, no concern for acute ischemic events

## 2023-09-06 NOTE — PLAN OF CARE
Problem: PAIN - ADULT  Goal: Verbalizes/displays adequate comfort level or baseline comfort level  Description: Interventions:  - Encourage patient to monitor pain and request assistance  - Assess pain using appropriate pain scale  - Administer analgesics based on type and severity of pain and evaluate response  - Implement non-pharmacological measures as appropriate and evaluate response  - Consider cultural and social influences on pain and pain management  - Notify physician/advanced practitioner if interventions unsuccessful or patient reports new pain  Outcome: Progressing     Problem: INFECTION - ADULT  Goal: Absence or prevention of progression during hospitalization  Description: INTERVENTIONS:  - Assess and monitor for signs and symptoms of infection  - Monitor lab/diagnostic results  - Monitor all insertion sites, i.e. indwelling lines, tubes, and drains  - Monitor endotracheal if appropriate and nasal secretions for changes in amount and color  - [place] appropriate cooling/warming therapies per order  - Administer medications as ordered  - Instruct and encourage patient and family to use good hand hygiene technique  - Identify and instruct in appropriate isolation precautions for identified infection/condition  Outcome: Progressing     Problem: SAFETY ADULT  Goal: Patient will remain free of falls  Description: INTERVENTIONS:  - Educate patient/family on patient safety including physical limitations  - Instruct patient to call for assistance with activity   - Consult OT/PT to assist with strengthening/mobility   - Keep Call bell within reach  - Keep bed low and locked with side rails adjusted as appropriate  - Keep care items and personal belongings within reach  - Initiate and maintain comfort rounds  - Make Fall Risk Sign visible to staff  - Offer Toileting every  Hours, in advance of need  - Initiate/Maintain alarm  - Obtain necessary fall risk management equipment:   - Apply yellow socks and bracelet for high fall risk patients  - Consider moving patient to room near nurses station  Outcome: Progressing     Problem: DISCHARGE PLANNING  Goal: Discharge to home or other facility with appropriate resources  Description: INTERVENTIONS:  - Identify barriers to discharge w/patient and caregiver  - Arrange for needed discharge resources and transportation as appropriate  - Identify discharge learning needs (meds, wound care, etc.)  - Arrange for interpretive services to assist at discharge as needed  - Refer to Case Management Department for coordinating discharge planning if the patient needs post-hospital services based on physician/advanced practitioner order or complex needs related to functional status, cognitive ability, or social support system  Outcome: Progressing     Problem: NEUROSENSORY - ADULT  Goal: Achieves stable or improved neurological status  Description: INTERVENTIONS  - Monitor and report changes in neurological status  - Monitor vital signs such as temperature, blood pressure, glucose, and any other labs ordered   - Initiate measures to prevent increased intracranial pressure  - Monitor for seizure activity and implement precautions if appropriate      Outcome: Progressing

## 2023-09-06 NOTE — ASSESSMENT & PLAN NOTE
Lab Results   Component Value Date    HGBA1C 5.5 09/03/2023       Recent Labs     09/05/23  1520 09/05/23  2047 09/05/23  2125 09/06/23  0708   POCGLU 103 79 104 124       Blood Sugar Average: Last 72 hrs:  (P) [id number]     · Updated A1c 5.5, well controlled  · Home Medication: Lantus 25U qHS, Metformin 1000mg BID, Ozempic 1mg/week.   · Hold home metformin, Ozempic while inpatient  · Continue home Lantus dosing, SSI coverage with Accu-Cheks  · Hypoglycemia protocol

## 2023-09-06 NOTE — PLAN OF CARE
Problem: PAIN - ADULT  Goal: Verbalizes/displays adequate comfort level or baseline comfort level  Description: Interventions:  - Encourage patient to monitor pain and request assistance  - Assess pain using appropriate pain scale  - Administer analgesics based on type and severity of pain and evaluate response  - Implement non-pharmacological measures as appropriate and evaluate response  - Consider cultural and social influences on pain and pain management  - Notify physician/advanced practitioner if interventions unsuccessful or patient reports new pain  Outcome: Progressing     Problem: INFECTION - ADULT  Goal: Absence or prevention of progression during hospitalization  Description: INTERVENTIONS:  - Assess and monitor for signs and symptoms of infection  - Monitor lab/diagnostic results  - Monitor all insertion sites, i.e. indwelling lines, tubes, and drains  - Monitor endotracheal if appropriate and nasal secretions for changes in amount and color  - [place] appropriate cooling/warming therapies per order  - Administer medications as ordered  - Instruct and encourage patient and family to use good hand hygiene technique  - Identify and instruct in appropriate isolation precautions for identified infection/condition  Outcome: Progressing     Problem: NEUROSENSORY - ADULT  Goal: Achieves stable or improved neurological status  Description: INTERVENTIONS  - Monitor and report changes in neurological status  - Monitor vital signs such as temperature, blood pressure, glucose, and any other labs ordered   - Initiate measures to prevent increased intracranial pressure  - Monitor for seizure activity and implement precautions if appropriate      Outcome: Progressing     Problem: GASTROINTESTINAL - ADULT  Goal: Minimal or absence of nausea and/or vomiting  Description: INTERVENTIONS:  - Administer IV fluids if ordered to ensure adequate hydration  - Maintain NPO status until nausea and vomiting are resolved  - Nasogastric tube if ordered  - Administer ordered antiemetic medications as needed  - Provide nonpharmacologic comfort measures as appropriate  - Advance diet as tolerated, if ordered  - Consider nutrition services referral to assist patient with adequate nutrition and appropriate food choices  Outcome: Progressing     Problem: GASTROINTESTINAL - ADULT  Goal: Maintains or returns to baseline bowel function  Description: INTERVENTIONS:  - Assess bowel function  - Encourage oral fluids to ensure adequate hydration  - Administer IV fluids if ordered to ensure adequate hydration  - Administer ordered medications as needed  - Encourage mobilization and activity  - Consider nutritional services referral to assist patient with adequate nutrition and appropriate food choices  Outcome: Progressing

## 2023-09-06 NOTE — DISCHARGE INSTR - AVS FIRST PAGE
You were treated and evaluated for abdominal pain, nausea and bloating. As discussed, this is most likely related to your gastroparesis, as well as constipation contributing. GI had evaluated you, you were treated with your usual gastroparesis medications, as well as constipation medications + enema, which helped improve your symptoms, constipation and gastroparesis. You can continue your usual home medications, make sure that you are hydrating well so the laxatives can work best.  Clau Mae can take over-the-counter Tylenol, ibuprofen/Motrin/Advil as needed for pain. Would avoid medications like opioids as much as possible, as these can make you constipated and worsen your gastroparesis. Recommend close outpatient follow-up with your GI specialist outpatient to discuss medication management. You were also evaluated for slurred speech, which neurology suspects is possibly related to side effects from your medications such as gabapentin, Klonopin. CT scan and MRI of your brain did not show signs of stroke. Given your history of high blood pressure, diabetes and smoking, would recommend continuing daily aspirin for prevention. If you have any further neurological symptoms or difficulty with your speech, talk to your primary care provider to discuss possible adjustment of your medications.

## 2023-09-07 ENCOUNTER — TELEPHONE (OUTPATIENT)
Dept: FAMILY MEDICINE CLINIC | Facility: CLINIC | Age: 58
End: 2023-09-07

## 2023-09-07 ENCOUNTER — TRANSITIONAL CARE MANAGEMENT (OUTPATIENT)
Dept: FAMILY MEDICINE CLINIC | Facility: CLINIC | Age: 58
End: 2023-09-07

## 2023-09-07 ENCOUNTER — PATIENT OUTREACH (OUTPATIENT)
Dept: CASE MANAGEMENT | Facility: OTHER | Age: 58
End: 2023-09-07

## 2023-09-07 DIAGNOSIS — Z71.89 COMPLEX CARE COORDINATION: Primary | ICD-10-CM

## 2023-09-07 LAB
ATRIAL RATE: 72 BPM
P AXIS: 67 DEGREES
PR INTERVAL: 212 MS
QRS AXIS: 43 DEGREES
QRSD INTERVAL: 70 MS
QT INTERVAL: 406 MS
QTC INTERVAL: 444 MS
T WAVE AXIS: 47 DEGREES
VENTRICULAR RATE: 72 BPM

## 2023-09-07 PROCEDURE — 93010 ELECTROCARDIOGRAM REPORT: CPT | Performed by: INTERNAL MEDICINE

## 2023-09-07 NOTE — UTILIZATION REVIEW
NOTIFICATION OF ADMISSION DISCHARGE   This is a Notification of Discharge from [facility]. Please be advised that this patient has been discharge from our facility. Below you will find the admission and discharge date and time including the patient’s disposition. UTILIZATION REVIEW CONTACT:  Sophie Roa  Utilization   Network Utilization Review Department  Phone: [phone] x carefully listen to the prompts. All voicemails are confidential.  Email: [email or web address]     ADMISSION INFORMATION  PRESENTATION DATE: 9/2/2023 11:40 PM  OBERVATION ADMISSION DATE:   INPATIENT ADMISSION DATE: 9/4/23  2:48 PM   DISCHARGE DATE: 9/6/2023 11:42 AM   DISPOSITION:Home/Self Care    IMPORTANT INFORMATION:  Send all requests for admission clinical reviews, approved or denied determinations and any other requests to dedicated fax number below belonging to the campus where the patient is receiving treatment.  List of dedicated fax numbers:  [place] DENIALS (Administrative/Medical Necessity) [phone] [address] [facility] (Maternity/NICU/Pediatrics) [phone]   [facility] [phone]   [facility] [phone] [address] [phone]   [address] [place], [place] [phone]   [facility] [phone]   [address] [place], [place] [address] [address] [facility] [phone]   [address] [place] [address] [phone]   [address] [facility] [phone] 27[address] [facility] [address] 32[facility] [phone]

## 2023-09-07 NOTE — PROGRESS NOTES
In basket message received with a referral from the HRR report. Chart reviewed. Patient was admitted to [facility] on 9/2 with abdominal pain. She has a history of diabetes, Bipolar 1 disorder,HTN and tobacco abuse. She has a history of gastroparesis. She was treated with Miralax and an enema. She discharged home on 9/6. I spoke with patient who reports she had 2 bowel movements today and has no pain. She has an appointment with her PCP on 9/19. Her spouse will take her. She has no questions concerning her discharged instructions. We reviewed her medications. She has no needs at this time. I gave her my contact information.   She did consent to another call

## 2023-09-14 ENCOUNTER — PATIENT OUTREACH (OUTPATIENT)
Dept: CASE MANAGEMENT | Facility: OTHER | Age: 58
End: 2023-09-14

## 2023-09-14 NOTE — PROGRESS NOTES
I spoke with patient who is doing well. She denies abdominal pain. She has had normal bowel movements the past 4 days. She has scheduled an appointment with her PCP on 9/19. She is taking her medications as directed. She does test her blood sugars and today it was 137. Her HgbA1c was 5.5 on 9/3. She has no needs at this time. She did consent to another call.

## 2023-09-15 ENCOUNTER — TELEPHONE (OUTPATIENT)
Age: 58
End: 2023-09-15

## 2023-09-15 NOTE — TELEPHONE ENCOUNTER
Patient called very confused stating she received an order in the mail signed by Charlotte Omer for an [address] [employer] [place] . Patient unsure what exactly this means or what it is. Please call patient back to discuss.

## 2023-09-18 ENCOUNTER — OFFICE VISIT (OUTPATIENT)
Dept: GASTROENTEROLOGY | Facility: CLINIC | Age: 58
End: 2023-09-18
Payer: COMMERCIAL

## 2023-09-18 VITALS
DIASTOLIC BLOOD PRESSURE: 102 MMHG | WEIGHT: 159.2 LBS | BODY MASS INDEX: 28.21 KG/M2 | SYSTOLIC BLOOD PRESSURE: 172 MMHG | HEIGHT: 63 IN | HEART RATE: 69 BPM

## 2023-09-18 DIAGNOSIS — F31.9 BIPOLAR 1 DISORDER, DEPRESSED (HCC): ICD-10-CM

## 2023-09-18 DIAGNOSIS — K31.84 DIABETIC GASTROPARESIS: Primary | ICD-10-CM

## 2023-09-18 DIAGNOSIS — R10.13 EPIGASTRIC PAIN: ICD-10-CM

## 2023-09-18 DIAGNOSIS — E11.43 DIABETIC GASTROPARESIS: Primary | ICD-10-CM

## 2023-09-18 PROCEDURE — 99214 OFFICE O/P EST MOD 30 MIN: CPT | Performed by: INTERNAL MEDICINE

## 2023-09-18 RX ORDER — GABAPENTIN 800 MG/1
TABLET ORAL
Qty: 90 TABLET | Refills: 1 | Status: SHIPPED | OUTPATIENT
Start: 2023-09-18

## 2023-09-18 NOTE — PROGRESS NOTES
[facility] - Outpatient Follow-up Note  Maddy Delacruz 56 y.o. female MRN: [id number]  Encounter: [id number]          ASSESSMENT AND PLAN:      1. Diabetic gastroparesis ([address])  Patient with severe diabetic gastroparesis, currently on pantoprazole 40 mg p.o. daily, sucralfate 1 g 4 times a day along with Reglan 10 mg 3 times a day before meal, she still having persistent abdominal pain, recent ER visit for worsening of abdominal pain, bloating and distention, she had a CAT scan which shows mild gastric distention with retained food in the stomach. She is here to discuss about further treatment plan for gastroparesis, she stopped using Ozempic, her blood sugar is well controlled. I discussed about scheduling EGD with intra pyloric Botox injection, risk and benefit of the side effect was discussed, if she responded to Botox injection then will refer for [facility]. Advised for tight glycemic control, low fiber and low fat in the diet and advised to remain compliant with medication, also advised her to take Zofran as needed  - EGD Botox; Future    2. Epigastric pain  Persistent chronic abdominal pain, up-to-date with endoscopy and colonoscopy, she is taking multiple medication but pain persist, she is s/p cholecystectomy, I think pain is related to underlying history of gastroparesis  - EGD Botox; Future    3. Constipation-possible related to colonic dysmotility, history of gastroparesis, advised to take MiraLAX 17 g p.o. nightly, if no improvement then will try Linzess 145 mcg p.o. daily    ______________________________________________________________________    SUBJECTIVE: Patient seen and examined, she come for follow-up, she still having persistent abdominal pain, postprandial bloating, nausea and lack of appetite.   She is taking pantoprazole every day, Carafate 4 times a day and Reglan before each meal, she had a recent ER visit because of worsening of abdominal pain, she was also admitted in the hospital and was advised to see us as an outpatient. Her gastric emptying study is abnormal with 75% of the food still retained in the stomach after 4-hour. She stopped using Ozempic, her blood sugar is well controlled with insulin and multiple other diabetic medication. She also complained about constipation, she had a recent CAT scan which shows retained food in the stomach with stomach distention. She is smoke half a pack date, no chronic alcohol use, she is up-to-date with screening colonoscopy      REVIEW OF SYSTEMS IS OTHERWISE NEGATIVE. Historical Information   Past Medical History:   Diagnosis Date   • Addiction to drug [facility])    • Adjustment disorder    • Alcohol abuse    • Alcoholism ([address])    • Anxiety    • Bipolar disorder ([address])    • Bowel obstruction ([address])    • Depression    • Diabetes type 2, controlled ([address])    • Disease of thyroid gland    • Gastritis    • Head injury    • Heart palpitations    • Hyperlipidemia    • Hypertension    • Hypothyroidism    • Psychiatric illness    • PTSD (post-traumatic stress disorder)    • Seizure ([address])    • Seizures ([address])    • Sleep difficulties    • Substance abuse ([address])    • Suicide attempt ([address])    • Withdrawal symptoms, drug or narcotic ([address])      Past Surgical History:   Procedure Laterality Date   • ABDOMINAL SURGERY     • APPENDECTOMY     • BOWEL RESECTION     • CHOLECYSTECTOMY     • ESOPHAGOGASTRODUODENOSCOPY N/A 05/18/2018    Procedure: ESOPHAGOGASTRODUODENOSCOPY (EGD) with bx;  Surgeon: Graciela Blackburn DO;  Location: [facility];   Service: Gastroenterology   • HYSTERECTOMY     • KNEE SURGERY Bilateral 1991     Social History   Social History     Substance and Sexual Activity   Alcohol Use Not Currently   • Alcohol/week: 6.0 standard drinks of alcohol   • Types: 6 Cans of beer per week    Comment: last drink on 08/15/20     Social History     Substance and Sexual Activity   Drug Use Never     Social History     Tobacco Use   Smoking Status Every Day   • Packs/day: 0.50   • Years: 25.00   • Total pack years: 12.50   • Types: Cigarettes   • Passive exposure: Current   Smokeless Tobacco Never   Tobacco Comments    smokes like 5 a day(06/24/2022)     Family History   Problem Relation Age of Onset   • Bipolar disorder Mother    • Cancer Father    • Diabetes Father        Meds/Allergies       Current Outpatient Medications:   •  Alcohol Swabs 70 % PADS  •  aluminum-magnesium hydroxide-simethicone (MAALOX) 2300-1967-336 mg/30 mL suspension  •  ARIPiprazole (ABILIFY) 10 mg tablet  •  aspirin 81 mg chewable tablet  •  atorvastatin (LIPITOR) 20 mg tablet  •  Blood Glucose Monitoring Suppl (OneTouch Verio Reflect) w/Device KIT  •  clonazePAM (KlonoPIN) 0.5 mg tablet  •  docusate sodium (COLACE) 100 mg capsule  •  ergocalciferol (VITAMIN D2) 50,000 units  •  escitalopram (LEXAPRO) 10 mg tablet  •  gabapentin (NEURONTIN) 800 mg tablet  •  hydrochlorothiazide (HYDRODIURIL) 12.5 mg tablet  •  levothyroxine 25 mcg tablet  •  metFORMIN (GLUCOPHAGE) 1000 MG tablet  •  metoclopramide (Reglan) 10 mg tablet  •  metoprolol tartrate (LOPRESSOR) 50 mg tablet  •  Microlet Lancets MISC  •  ondansetron (ZOFRAN) 4 mg tablet  •  OneTouch Delica Lancets 57H MISC  •  pantoprazole (PROTONIX) 40 mg tablet  •  sucralfate (CARAFATE) 1 g tablet  •  SUMAtriptan (IMITREX) 50 mg tablet  •  traZODone (DESYREL) 100 mg tablet  •  docusate sodium (COLACE) 100 mg capsule  •  insulin glargine (LANTUS) 100 units/mL subcutaneous injection  •  polyethylene glycol (MIRALAX) 17 g packet  •  promethazine (PHENERGAN) 12.5 mg suppository  •  semaglutide, 1 mg/dose, (Ozempic) 4 mg/3 mL injection pen    Allergies   Allergen Reactions   • Losartan Cough   • Latex Rash           Objective     Blood pressure (!) 172/102, pulse 69, height 5' 3" (1.6 m), weight 72.2 kg (159 lb 3.2 oz), not currently breastfeeding. Body mass index is 28.2 kg/m².       PHYSICAL EXAM:      General Appearance:   Alert, cooperative, no distress   HEENT:   Normocephalic, atraumatic, anicteric.     Neck:  Supple, symmetrical, trachea midline   Lungs:   Clear to auscultation bilaterally; no rales, rhonchi or wheezing; respirations unlabored    Heart[de-identified]   Regular rate and rhythm; no murmur, rub, or gallop. Abdomen:   Soft, non-tender, non-distended; normal bowel sounds; no masses, no organomegaly    Genitalia:   Deferred    Rectal:   Deferred    Extremities:  No cyanosis, clubbing or edema    Pulses:  2+ and symmetric    Skin:  No jaundice, rashes, or lesions    Lymph nodes:  No palpable cervical lymphadenopathy        Lab Results:   No visits with results within 1 Day(s) from this visit.    Latest known visit with results is:   Admission on 09/02/2023, Discharged on 09/06/2023   Component Date Value   • POC Glucose 09/02/2023 164 (H)    • WBC 09/03/2023 10.45 (H)    • RBC 09/03/2023 4.48    • Hemoglobin 09/03/2023 13.1    • Hematocrit 09/03/2023 40.6    • MCV 09/03/2023 91    • MCH 09/03/2023 29.2    • MCHC 09/03/2023 32.3    • RDW 09/03/2023 12.6    • MPV 09/03/2023 9.6    • Platelets 42/07/2720 381    • nRBC 09/03/2023 0    • Neutrophils Relative 09/03/2023 54    • Immat GRANS % 09/03/2023 0    • Lymphocytes Relative 09/03/2023 36    • Monocytes Relative 09/03/2023 8    • Eosinophils Relative 09/03/2023 2    • Basophils Relative 09/03/2023 0    • Neutrophils Absolute 09/03/2023 5.63    • Immature Grans Absolute 09/03/2023 0.03    • Lymphocytes Absolute 09/03/2023 3.71    • Monocytes Absolute 09/03/2023 0.88    • Eosinophils Absolute 09/03/2023 0.16    • Basophils Absolute 09/03/2023 0.04    • Protime 09/03/2023 11.3 (L)    • INR 09/03/2023 0.80 (L)    • PTT 09/03/2023 29    • Sodium 09/03/2023 139    • Potassium 09/03/2023 3.4 (L)    • Chloride 09/03/2023 100    • CO2 09/03/2023 31    • ANION GAP 09/03/2023 8    • BUN 09/03/2023 9    • Creatinine 09/03/2023 0.79    • Glucose 09/03/2023 135    • Calcium 09/03/2023 10.0    • AST 09/03/2023 14    • ALT 09/03/2023 14    • Alkaline Phosphatase 09/03/2023 92    • Total Protein 09/03/2023 8.1    • Albumin 09/03/2023 4.8    • Total Bilirubin 09/03/2023 0.34    • eGFR 09/03/2023 84    • Lipase 09/03/2023 38    • hs TnI 0hr 09/03/2023 4    • Ethanol Lvl 09/03/2023 <10    • Color, UA 09/03/2023 Yellow    • Clarity, UA 09/03/2023 Clear    • Specific Gravity, UA 09/03/2023 <=1.005    • pH, UA 09/03/2023 6.5    • Leukocytes, UA 09/03/2023 Negative    • Nitrite, UA 09/03/2023 Negative    • Protein, UA 09/03/2023 Negative    • Glucose, UA 09/03/2023 Negative    • Ketones, UA 09/03/2023 Negative    • Urobilinogen, UA 09/03/2023 0.2    • Bilirubin, UA 09/03/2023 Negative    • Occult Blood, UA 09/03/2023 Negative    • Amph/Meth UR 09/03/2023 Negative    • Barbiturate Ur 09/03/2023 Negative    • Benzodiazepine Urine 09/03/2023 Negative    • Cocaine Urine 09/03/2023 Negative    • Opiate Urine 09/03/2023 Negative    • PCP Ur 09/03/2023 Negative    • THC Urine 09/03/2023 Negative    • Oxycodone Urine 09/03/2023 Negative    • AV peak gradient 09/05/2023 77    • LA size 09/05/2023 3.8    • Triscuspid Valve Regurgi* 09/05/2023 21.0    • Tricuspid valve peak reg* 09/05/2023 2.29    • LVPWd 09/05/2023 1.10    • Left Atrium Area-systoli* 09/05/2023 20.7    • Left Atrium Area-systoli* 09/05/2023 24.5    • MV E' Tissue Velocity Se* 09/05/2023 9    • RA 2D Volume 09/05/2023 37.0    • Tricuspid annular plane * 09/05/2023 2.10    • TR Peak Kieran 09/05/2023 2.3    • IVSd 09/05/2023 1.21    • LV DIASTOLIC VOLUME (MOD* 37/07/5354 115    • LEFT VENTRICLE SYSTOLIC * 51/69/6612 32    • Left ventricular stroke * 09/05/2023 82.00    • AV Deceleration Time 09/05/2023 1,934    • A4C EF 09/05/2023 64    • LA length (A2C) 09/05/2023 5.90    • LVIDd 09/05/2023 4.50    • IVS 09/05/2023 1.2    • LVIDS 09/05/2023 2.90    • FS 09/05/2023 36    • LA volume (BP) 09/05/2023 69    • Asc Ao 09/05/2023 3.3    • Ao root 09/05/2023 3.00    • RVID d 09/05/2023 3.4    • AV regurgitation pressur* 09/05/2023 561    • MV valve area p 1/2 meth* 09/05/2023 4.00    • E wave deceleration time 09/05/2023 189    • MV Peak E Kieran 09/05/2023 73    • MV Peak A Kieran 09/05/2023 0.58    • RAA A4C 09/05/2023 14.4    • MV stenosis pressure 1/2* 09/05/2023 55    • LVSV, 2D 09/05/2023 82    • LV EF 09/05/2023 65    • Cholesterol 09/03/2023 158    • Triglycerides 09/03/2023 62    • HDL, Direct 09/03/2023 41 (L)    • LDL Calculated 09/03/2023 105 (H)    • Hemoglobin A1C 09/03/2023 5.5    • EAG 09/03/2023 111    • Sodium 09/03/2023 139    • Potassium 09/03/2023 4.1    • Chloride 09/03/2023 105    • CO2 09/03/2023 28    • ANION GAP 09/03/2023 6    • BUN 09/03/2023 9    • Creatinine 09/03/2023 0.68    • Glucose 09/03/2023 100    • Calcium 09/03/2023 8.7    • eGFR 09/03/2023 98    • Lyme Total Antibodies 09/03/2023 Negative    • POC Glucose 09/03/2023 113    • POC Glucose 09/03/2023 101    • POC Glucose 09/03/2023 114    • POC Glucose 09/03/2023 94    • POC Glucose 09/04/2023 120    • POC Glucose 09/04/2023 147 (H)    • POC Glucose 09/04/2023 115    • POC Glucose 09/04/2023 122    • Sodium 09/05/2023 140    • Potassium 09/05/2023 3.1 (L)    • Chloride 09/05/2023 105    • CO2 09/05/2023 29    • ANION GAP 09/05/2023 6    • BUN 09/05/2023 7    • Creatinine 09/05/2023 0.64    • Glucose 09/05/2023 145 (H)    • Calcium 09/05/2023 8.7    • eGFR 09/05/2023 100    • WBC 09/05/2023 7.22    • RBC 09/05/2023 3.72 (L)    • Hemoglobin 09/05/2023 11.0 (L)    • Hematocrit 09/05/2023 34.2 (L)    • MCV 09/05/2023 92    • MCH 09/05/2023 29.6    • MCHC 09/05/2023 32.2    • RDW 09/05/2023 12.5    • Platelets 87/35/5620 312    • MPV 09/05/2023 9.8    • POC Glucose 09/05/2023 63 (L)    • POC Glucose 09/05/2023 137    • POC Glucose 09/05/2023 97    • POC Glucose 09/05/2023 103    • POC Glucose 09/05/2023 79    • POC Glucose 09/05/2023 104    • WBC 09/06/2023 6.87    • RBC 09/06/2023 4.32    • Hemoglobin 09/06/2023 12.5    • Hematocrit 09/06/2023 39.3    • MCV 09/06/2023 91    • MCH 09/06/2023 28.9    • MCHC 09/06/2023 31.8    • RDW 09/06/2023 12.3    • Platelets 15/01/1648 334    • MPV 09/06/2023 9.8    • Sodium 09/06/2023 139    • Potassium 09/06/2023 4.0    • Chloride 09/06/2023 106    • CO2 09/06/2023 29    • ANION GAP 09/06/2023 4    • BUN 09/06/2023 8    • Creatinine 09/06/2023 0.68    • Glucose 09/06/2023 164 (H)    • Calcium 09/06/2023 9.3    • eGFR 09/06/2023 98    • POC Glucose 09/06/2023 124    • POC Glucose 09/06/2023 109    • Ventricular Rate 09/03/2023 72    • Atrial Rate 09/03/2023 72    • AL Interval 09/03/2023 212    • QRSD Interval 09/03/2023 70    • QT Interval 09/03/2023 406    • QTC Interval 09/03/2023 444    • P Axis 09/03/2023 67    • QRS Axis 09/03/2023 43    • T Wave Axis 09/03/2023 47          Radiology Results:   Echo complete w/ contrast if indicated    Result Date: 9/5/2023  Narrative: •  Left Ventricle: Left ventricular cavity size is normal. Wall thickness is mildly increased. There is mild concentric hypertrophy. The left ventricular ejection fraction is 65%. Systolic function is normal. Wall motion is normal. Diastolic function is normal. •  Right Ventricle: Right ventricular cavity size is normal. Systolic function is normal. •  Left Atrium: The atrium is mildly dilated. •  Aortic Valve: There is mild regurgitation. •  Mitral Valve: There is mild regurgitation. •  Tricuspid Valve: There is mild regurgitation. MRI brain wo contrast    Result Date: 9/3/2023  Narrative: MRI BRAIN WITHOUT CONTRAST INDICATION: n. COMPARISON:   None. TECHNIQUE:  Multiplanar, multisequence imaging of the brain was performed. IMAGE QUALITY:  Diagnostic. FINDINGS: BRAIN PARENCHYMA:  There is no discrete mass, mass effect or midline shift. There is no intracranial hemorrhage.  Single focus of mildly increased diffusion seen on series 3 image 22 in the left frontal lobe likely artifactual. There is no evidence of acute infarction and diffusion imaging is unremarkable. There are no white matter changes in the cerebral hemispheres. VENTRICLES:  Normal for the patient's age. SELLA AND PITUITARY GLAND:  Normal. ORBITS:  Normal. PARANASAL SINUSES:  Normal. VASCULATURE:  Evaluation of the major intracranial vasculature demonstrates appropriate flow voids. CALVARIUM AND SKULL BASE:  Normal. EXTRACRANIAL SOFT TISSUES:  Normal.     Impression: No MR evidence of acute ischemia. Workstation performed: [id number]     CTA head and neck with and without contrast    Result Date: 9/3/2023  Narrative: CTA NECK AND BRAIN WITH AND WITHOUT CONTRAST INDICATION: Slurred speech x3 days facial asymmetry COMPARISON:   9/26/2020. TECHNIQUE:  Routine CT imaging of the Brain without contrast.  Post contrast imaging was performed after administration of iodinated contrast through the neck and brain. Post contrast axial 0.625 mm images timed to opacify the arterial system. 3D rendering was performed on an independent workstation. MIP reconstructions performed. Coronal reconstructions were performed of the noncontrast portion of the brain. Radiation dose length product (DLP) for this visit:  1298.6 mGy-cm . This examination, like all CT scans performed in the [facility], was performed utilizing techniques to minimize radiation dose exposure, including the use of iterative  reconstruction and automated exposure control. IV Contrast:  100 mL of iohexol (OMNIPAQUE) IMAGE QUALITY:   Diagnostic FINDINGS: NONCONTRAST BRAIN PARENCHYMA:  No intracranial mass, mass effect or midline shift. No CT signs of acute infarction. No acute parenchymal hemorrhage. VENTRICLES AND EXTRA-AXIAL SPACES:  Normal for the patient's age. VISUALIZED ORBITS: Normal visualized orbits. PARANASAL SINUSES: Normal visualized paranasal sinuses. CERVICAL VASCULATURE AORTIC ARCH AND GREAT VESSELS:  Normal aortic arch and great vessel origins.  Normal visualized subclavian vessels. RIGHT VERTEBRAL ARTERY CERVICAL SEGMENT:  Normal origin. The vessel is normal in caliber throughout the neck. LEFT VERTEBRAL ARTERY CERVICAL SEGMENT:  Normal origin. The vessel is normal in caliber throughout the neck. RIGHT EXTRACRANIAL CAROTID SEGMENT:  Normal caliber common carotid artery. Normal bifurcation and cervical internal carotid artery. No stenosis or dissection. LEFT EXTRACRANIAL CAROTID SEGMENT:  Normal caliber common carotid artery. Normal bifurcation and cervical internal carotid artery. No stenosis or dissection. NASCET criteria was used to determine the degree of internal carotid artery diameter stenosis. INTRACRANIAL VASCULATURE INTERNAL CAROTID ARTERIES: Mild atherosclerotic changes bilateral cavernous segments without significant stenosis normal ICA terminus. ANTERIOR CIRCULATION:  Symmetric A1 segments and anterior cerebral arteries with normal enhancement. Normal anterior communicating artery. MIDDLE CEREBRAL ARTERY CIRCULATION:  M1 segment and middle cerebral artery branches demonstrate normal enhancement bilaterally. DISTAL VERTEBRAL ARTERIES:  Normal distal vertebral arteries. Posterior inferior cerebellar artery origins are normal. Normal vertebral basilar junction. BASILAR ARTERY:  Basilar artery is normal in caliber. Normal superior cerebellar arteries. POSTERIOR CEREBRAL ARTERIES: Both posterior cerebral arteries arises from the basilar tip. Both arteries demonstrate normal enhancement. Hypoplastic posterior communicating arteries. VENOUS STRUCTURES: Patent. NON VASCULAR ANATOMY BONY STRUCTURES:  No acute osseous abnormality. SOFT TISSUES OF THE NECK:  Unremarkable. THORACIC INLET:  Normal.     Impression: No acute intracranial abnormality. No flow-limiting stenosis or large vessel occlusion within the major vessels of the [place]. No significant stenosis of the cervical carotid or vertebral arteries.  Workstation performed: [id number]     CT abdomen pelvis with contrast    Result Date: 9/3/2023  Narrative: CT ABDOMEN AND PELVIS WITH IV CONTRAST INDICATION:   Abdominal pain, acute, nonlocalized Diffuse abdominal pain. COMPARISON: 923. TECHNIQUE:  CT examination of the abdomen and pelvis was performed. Multiplanar 2D reformatted images were created from the source data. This examination, like all CT scans performed in the [facility], was performed utilizing techniques to minimize radiation dose exposure, including the use of iterative reconstruction and automated exposure control. Radiation dose length product (DLP) for this visit:  548.7 mGy-cm IV Contrast:  100 mL of iohexol (OMNIPAQUE) Enteric Contrast:  Enteric contrast was not administered. FINDINGS: ABDOMEN LOWER CHEST:  No clinically significant abnormality identified in the visualized lower chest. LIVER/BILIARY TREE:  One or more subcentimeter sharply circumscribed low-density hepatic lesion(s) are noted, too small to accurately characterize, but statistically most likely to represent subcentimeter hepatic cysts. No suspicious solid hepatic lesion is identified. Hepatic contours are normal.  No biliary dilatation. GALLBLADDER: Gallbladder is surgically absent. SPLEEN:  Unremarkable. PANCREAS:  Unremarkable. ADRENAL GLANDS:  Unremarkable. KIDNEYS/URETERS:  Unremarkable. No hydronephrosis. STOMACH AND BOWEL: Stomach is distended consistent with the known diagnosis of delayed gastric emptying. APPENDIX:  No findings to suggest appendicitis. ABDOMINOPELVIC CAVITY:  No ascites. No pneumoperitoneum. No lymphadenopathy. VESSELS:  Unremarkable for patient's age. PELVIS REPRODUCTIVE ORGANS:  Unremarkable for patient's age. URINARY BLADDER:  Unremarkable. ABDOMINAL WALL/INGUINAL REGIONS:  Unremarkable. OSSEOUS STRUCTURES:  No acute fracture or destructive osseous lesion. Impression: Stomach is distended consistent with the known diagnosis of delayed gastric emptying.  No acute pathology visualized on CT of the abdomen and pelvis with IV without oral contrast. Workstation performed: [id number]

## 2023-09-19 ENCOUNTER — HOSPITAL ENCOUNTER (EMERGENCY)
Facility: HOSPITAL | Age: 58
Discharge: HOME | End: 2023-09-20
Attending: INTERNAL MEDICINE
Payer: COMMERCIAL

## 2023-09-19 ENCOUNTER — APPOINTMENT (EMERGENCY)
Dept: CT IMAGING | Facility: HOSPITAL | Age: 58
End: 2023-09-19
Payer: COMMERCIAL

## 2023-09-19 ENCOUNTER — OFFICE VISIT (OUTPATIENT)
Dept: FAMILY MEDICINE CLINIC | Facility: CLINIC | Age: 58
End: 2023-09-19
Payer: COMMERCIAL

## 2023-09-19 VITALS
TEMPERATURE: 97.3 F | SYSTOLIC BLOOD PRESSURE: 138 MMHG | WEIGHT: 158 LBS | HEART RATE: 76 BPM | BODY MASS INDEX: 28 KG/M2 | RESPIRATION RATE: 16 BRPM | HEIGHT: 63 IN | DIASTOLIC BLOOD PRESSURE: 86 MMHG | OXYGEN SATURATION: 97 %

## 2023-09-19 VITALS
RESPIRATION RATE: 18 BRPM | OXYGEN SATURATION: 97 % | SYSTOLIC BLOOD PRESSURE: 121 MMHG | HEART RATE: 63 BPM | TEMPERATURE: 98.4 F | DIASTOLIC BLOOD PRESSURE: 70 MMHG

## 2023-09-19 DIAGNOSIS — K29.00 ACUTE GASTRITIS WITHOUT HEMORRHAGE, UNSPECIFIED GASTRITIS TYPE: Primary | ICD-10-CM

## 2023-09-19 DIAGNOSIS — R10.13 EPIGASTRIC PAIN: ICD-10-CM

## 2023-09-19 DIAGNOSIS — Z79.899 POLYPHARMACY: ICD-10-CM

## 2023-09-19 DIAGNOSIS — Z09 HOSPITAL DISCHARGE FOLLOW-UP: Primary | ICD-10-CM

## 2023-09-19 DIAGNOSIS — F31.9 BIPOLAR 1 DISORDER, DEPRESSED (HCC): ICD-10-CM

## 2023-09-19 DIAGNOSIS — F10.11 ALCOHOL ABUSE, IN REMISSION: ICD-10-CM

## 2023-09-19 DIAGNOSIS — K31.84 GASTROPARESIS: ICD-10-CM

## 2023-09-19 LAB
ALBUMIN SERPL BCP-MCNC: 4.6 G/DL (ref 3.5–5)
ALP SERPL-CCNC: 95 U/L (ref 34–104)
ALT SERPL W P-5'-P-CCNC: 22 U/L (ref 7–52)
ANION GAP SERPL CALCULATED.3IONS-SCNC: 9 MMOL/L
AST SERPL W P-5'-P-CCNC: 19 U/L (ref 13–39)
BASOPHILS # BLD AUTO: 0.04 THOUSANDS/ÂΜL (ref 0–0.1)
BASOPHILS NFR BLD AUTO: 0 % (ref 0–1)
BILIRUB SERPL-MCNC: 0.34 MG/DL (ref 0.2–1)
BUN SERPL-MCNC: 6 MG/DL (ref 5–25)
CALCIUM SERPL-MCNC: 9.6 MG/DL (ref 8.4–10.2)
CHLORIDE SERPL-SCNC: 102 MMOL/L (ref 96–108)
CO2 SERPL-SCNC: 28 MMOL/L (ref 21–32)
CREAT SERPL-MCNC: 0.78 MG/DL (ref 0.6–1.3)
EOSINOPHIL # BLD AUTO: 0.13 THOUSAND/ÂΜL (ref 0–0.61)
EOSINOPHIL NFR BLD AUTO: 1 % (ref 0–6)
ERYTHROCYTE [DISTWIDTH] IN BLOOD BY AUTOMATED COUNT: 12.7 % (ref 11.6–15.1)
GFR SERPL CREATININE-BSD FRML MDRD: 85 ML/MIN/1.73SQ M
GLUCOSE SERPL-MCNC: 113 MG/DL (ref 65–140)
HCT VFR BLD AUTO: 45 % (ref 34.8–46.1)
HGB BLD-MCNC: 14.1 G/DL (ref 11.5–15.4)
IMM GRANULOCYTES # BLD AUTO: 0.02 THOUSAND/UL (ref 0–0.2)
IMM GRANULOCYTES NFR BLD AUTO: 0 % (ref 0–2)
LIPASE SERPL-CCNC: 32 U/L (ref 11–82)
LYMPHOCYTES # BLD AUTO: 4.08 THOUSANDS/ÂΜL (ref 0.6–4.47)
LYMPHOCYTES NFR BLD AUTO: 38 % (ref 14–44)
MCH RBC QN AUTO: 28.5 PG (ref 26.8–34.3)
MCHC RBC AUTO-ENTMCNC: 31.3 G/DL (ref 31.4–37.4)
MCV RBC AUTO: 91 FL (ref 82–98)
MONOCYTES # BLD AUTO: 0.81 THOUSAND/ÂΜL (ref 0.17–1.22)
MONOCYTES NFR BLD AUTO: 8 % (ref 4–12)
NEUTROPHILS # BLD AUTO: 5.54 THOUSANDS/ÂΜL (ref 1.85–7.62)
NEUTS SEG NFR BLD AUTO: 53 % (ref 43–75)
NRBC BLD AUTO-RTO: 0 /100 WBCS
PLATELET # BLD AUTO: 310 THOUSANDS/UL (ref 149–390)
PMV BLD AUTO: 10.9 FL (ref 8.9–12.7)
POTASSIUM SERPL-SCNC: 3.4 MMOL/L (ref 3.5–5.3)
PROT SERPL-MCNC: 8.1 G/DL (ref 6.4–8.4)
RBC # BLD AUTO: 4.95 MILLION/UL (ref 3.81–5.12)
SODIUM SERPL-SCNC: 139 MMOL/L (ref 135–147)
WBC # BLD AUTO: 10.62 THOUSAND/UL (ref 4.31–10.16)

## 2023-09-19 PROCEDURE — 99284 EMERGENCY DEPT VISIT MOD MDM: CPT | Performed by: INTERNAL MEDICINE

## 2023-09-19 PROCEDURE — 36415 COLL VENOUS BLD VENIPUNCTURE: CPT | Performed by: INTERNAL MEDICINE

## 2023-09-19 PROCEDURE — 99284 EMERGENCY DEPT VISIT MOD MDM: CPT

## 2023-09-19 PROCEDURE — 85025 COMPLETE CBC W/AUTO DIFF WBC: CPT | Performed by: INTERNAL MEDICINE

## 2023-09-19 PROCEDURE — 74177 CT ABD & PELVIS W/CONTRAST: CPT

## 2023-09-19 PROCEDURE — G1004 CDSM NDSC: HCPCS

## 2023-09-19 PROCEDURE — 96376 TX/PRO/DX INJ SAME DRUG ADON: CPT

## 2023-09-19 PROCEDURE — 96374 THER/PROPH/DIAG INJ IV PUSH: CPT

## 2023-09-19 PROCEDURE — 99495 TRANSJ CARE MGMT MOD F2F 14D: CPT | Performed by: NURSE PRACTITIONER

## 2023-09-19 PROCEDURE — 96361 HYDRATE IV INFUSION ADD-ON: CPT

## 2023-09-19 PROCEDURE — 96375 TX/PRO/DX INJ NEW DRUG ADDON: CPT

## 2023-09-19 PROCEDURE — 80053 COMPREHEN METABOLIC PANEL: CPT | Performed by: INTERNAL MEDICINE

## 2023-09-19 PROCEDURE — 83690 ASSAY OF LIPASE: CPT | Performed by: INTERNAL MEDICINE

## 2023-09-19 RX ORDER — SODIUM CHLORIDE 9 MG/ML
125 INJECTION, SOLUTION INTRAVENOUS CONTINUOUS
Status: DISCONTINUED | OUTPATIENT
Start: 2023-09-19 | End: 2023-09-20 | Stop reason: HOSPADM

## 2023-09-19 RX ORDER — MORPHINE SULFATE 4 MG/ML
4 INJECTION, SOLUTION INTRAMUSCULAR; INTRAVENOUS ONCE
Status: COMPLETED | OUTPATIENT
Start: 2023-09-19 | End: 2023-09-19

## 2023-09-19 RX ORDER — ONDANSETRON 2 MG/ML
4 INJECTION INTRAMUSCULAR; INTRAVENOUS ONCE
Status: COMPLETED | OUTPATIENT
Start: 2023-09-19 | End: 2023-09-19

## 2023-09-19 RX ORDER — OXYCODONE HYDROCHLORIDE AND ACETAMINOPHEN 5; 325 MG/1; MG/1
1 TABLET ORAL EVERY 8 HOURS PRN
Qty: 6 TABLET | Refills: 0 | Status: SHIPPED | OUTPATIENT
Start: 2023-09-19 | End: 2023-09-25

## 2023-09-19 RX ORDER — FAMOTIDINE 10 MG/ML
20 INJECTION, SOLUTION INTRAVENOUS ONCE
Status: COMPLETED | OUTPATIENT
Start: 2023-09-19 | End: 2023-09-19

## 2023-09-19 RX ADMIN — MORPHINE SULFATE 4 MG: 4 INJECTION INTRAVENOUS at 19:52

## 2023-09-19 RX ADMIN — IOHEXOL 100 ML: 350 INJECTION, SOLUTION INTRAVENOUS at 21:22

## 2023-09-19 RX ADMIN — SODIUM CHLORIDE 125 ML/HR: 0.9 INJECTION, SOLUTION INTRAVENOUS at 20:14

## 2023-09-19 RX ADMIN — MORPHINE SULFATE 4 MG: 4 INJECTION INTRAVENOUS at 20:57

## 2023-09-19 RX ADMIN — FAMOTIDINE 20 MG: 10 INJECTION, SOLUTION INTRAVENOUS at 19:52

## 2023-09-19 RX ADMIN — ONDANSETRON 4 MG: 2 INJECTION INTRAMUSCULAR; INTRAVENOUS at 19:52

## 2023-09-19 NOTE — ED PROVIDER NOTES
History  Chief Complaint   Patient presents with   • Abdominal Pain     Pt reports severe RUQ abdominal pain for the past 3 days. Pt has hx of gastroparesis      This is a 56years old came for having abdominal pain. Patient is abdominal distention and epigastric pain. Patient has history of diabetic gastroparesis and she is followed by GI . Patient went yesterday to have a GI doctor who advised with her that she need EGD and into the pyloric Botox injection which can relieve her symptoms. Is Protonix 40 mg once daily sucralfate 1 g x 4 daily Reglan x3 daily and in spite of this she has persistent epigastric pain. Patient has CT abdomen pelvis which was done on 9/3/2023 shows; Stomach is distended consistent with the known diagnosis of delayed gastric emptying.   No acute pathology visualized on CT of the abdomen and pelvis with IV without oral contrast..  Last meal was about 4 PM.  Patient vomited the early this morning. Patient stated that she has constipation and she is always constipated and usually she has times 5 BM daily. Patient denies any fever. Patient has multiple abdominal surgeries including bowel resection, cholecystectomy, appendectomy, hysterectomy. Patient has no previous intestinal obstruction in the past.          Prior to Admission Medications   Prescriptions Last Dose Informant Patient Reported? Taking? ARIPiprazole (ABILIFY) 10 mg tablet  Self No No   Sig: Take 1 tablet (10 mg total) by mouth daily   Alcohol Swabs 70 % PADS  Self No No   Sig: May substitute brand based on insurance coverage. Check glucose TID. Blood Glucose Monitoring Suppl (OneTouch Verio Reflect) w/Device KIT  Self No No   Sig: May substitute brand based on insurance coverage. Check glucose TID. Microlet Lancets MISC  Self Yes No   OneTouch Delica Lancets 71N MISC  Self No No   Sig: May substitute brand based on insurance coverage. Check glucose TID.    SUMAtriptan (IMITREX) 50 mg tablet  Self No No Sig: Take 1 tablet (50 mg total) by mouth once as needed for migraine for up to 1 dose may repeat in 2 hours if necessary   aluminum-magnesium hydroxide-simethicone (MAALOX) 9582-7636-229 mg/30 mL suspension  Self No No   Sig: Take 30 mL by mouth every 4 (four) hours as needed for indigestion or heartburn (gas)   aspirin 81 mg chewable tablet  Self No No   Sig: Chew 1 tablet (81 mg total) daily Do not start before September 7, 2023.    atorvastatin (LIPITOR) 20 mg tablet  Self No No   Sig: Take 1 tablet (20 mg total) by mouth daily   clonazePAM (KlonoPIN) 0.5 mg tablet  Self No No   Sig: Take 1 tablet (0.5 mg total) by mouth 2 (two) times a day as needed for seizures   docusate sodium (COLACE) 100 mg capsule  Self No No   Sig: Take 1 capsule (100 mg total) by mouth 2 (two) times a day for 14 days   docusate sodium (COLACE) 100 mg capsule  Self No No   Sig: Take 1 capsule (100 mg total) by mouth 2 (two) times a day   ergocalciferol (VITAMIN D2) 50,000 units  Self No No   Sig: TAKE 1 CAPSULE BY MOUTH 1 TIME A WEEK   escitalopram (LEXAPRO) 10 mg tablet  Self No No   Sig: TAKE 1 TABLET(10 MG) BY MOUTH DAILY   gabapentin (NEURONTIN) 800 mg tablet   No No   Sig: TAKE 1 TABLET(800 MG) BY MOUTH THREE TIMES DAILY   hydrochlorothiazide (HYDRODIURIL) 12.5 mg tablet  Self No No   Sig: Take 1 tablet (12.5 mg total) by mouth daily   insulin glargine (LANTUS) 100 units/mL subcutaneous injection  Self No No   Sig: Inject 25 Units under the skin daily at bedtime   Patient not taking: Reported on 9/7/2023   levothyroxine 25 mcg tablet  Self No No   Sig: TAKE 1 TABLET(25 MCG) BY MOUTH DAILY IN THE EARLY MORNING   metFORMIN (GLUCOPHAGE) 1000 MG tablet  Self No No   Sig: TAKE 1 TABLET(1000 MG) BY MOUTH TWICE DAILY WITH MEALS   metoclopramide (Reglan) 10 mg tablet  Self No No   Sig: Take 1 tablet (10 mg total) by mouth 2 (two) times a day before lunch and dinner   metoprolol tartrate (LOPRESSOR) 50 mg tablet  Self No No   Sig: TAKE 1 TABLET(50 MG) BY MOUTH EVERY 12 HOURS   ondansetron (ZOFRAN) 4 mg tablet  Self No No   Sig: Take 1 tablet (4 mg total) by mouth every 8 (eight) hours as needed for nausea or vomiting   pantoprazole (PROTONIX) 40 mg tablet  Self No No   Sig: TAKE 1 TABLET BY MOUTH TWICE DAILY   polyethylene glycol (MIRALAX) 17 g packet  Self No No   Sig: Take 17 g by mouth daily   Patient not taking: Reported on 9/18/2023   sucralfate (CARAFATE) 1 g tablet  Self No No   Sig: TAKE 1 TABLET(1 GRAM) BY MOUTH FOUR TIMES DAILY   traZODone (DESYREL) 100 mg tablet  Self No No   Sig: Take 2 tablets (200 mg total) by mouth daily at bedtime      Facility-Administered Medications: None       Past Medical History:   Diagnosis Date   • Addiction to drug [facility])    • Adjustment disorder    • Alcohol abuse    • Alcoholism ([address])    • Anxiety    • Bipolar disorder ([address])    • Bowel obstruction ([address])    • Depression    • Diabetes type 2, controlled ([address])    • Disease of thyroid gland    • Gastritis    • Head injury    • Heart palpitations    • Hyperlipidemia    • Hypertension    • Hypothyroidism    • Psychiatric illness    • PTSD (post-traumatic stress disorder)    • Seizure ([address])    • Seizures ([address])    • Sleep difficulties    • Substance abuse ([address])    • Suicide attempt ([address])    • Withdrawal symptoms, drug or narcotic ([address])        Past Surgical History:   Procedure Laterality Date   • ABDOMINAL SURGERY     • APPENDECTOMY     • BOWEL RESECTION     • CHOLECYSTECTOMY     • ESOPHAGOGASTRODUODENOSCOPY N/A 05/18/2018    Procedure: ESOPHAGOGASTRODUODENOSCOPY (EGD) with bx;  Surgeon: Lisa Del Cid DO;  Location: [facility]; Service: Gastroenterology   • HYSTERECTOMY     • KNEE SURGERY Bilateral 1991       Family History   Problem Relation Age of Onset   • Bipolar disorder Mother    • Cancer Father    • Diabetes Father      I have reviewed and agree with the history as documented.     E-Cigarette/Vaping   • E-Cigarette Use Never User      E-Cigarette/Vaping Substances   • Nicotine No    • THC No    • CBD No    • Flavoring No    • Other No    • Unknown No      Social History     Tobacco Use   • Smoking status: Every Day     Packs/day: 0.50     Years: 25.00     Total pack years: 12.50     Types: Cigarettes     Passive exposure: Current   • Smokeless tobacco: Never   • Tobacco comments:     smokes like 5 a day(06/24/2022)   Vaping Use   • Vaping Use: Never used   Substance Use Topics   • Alcohol use: Not Currently     Alcohol/week: 6.0 standard drinks of alcohol     Types: 6 Cans of beer per week     Comment: last drink on 08/15/20   • Drug use: Never       Review of Systems   Constitutional: Negative for fatigue and fever. HENT: Negative for congestion, sinus pressure, sneezing, sore throat, tinnitus and trouble swallowing. Respiratory: Negative for cough and shortness of breath. Cardiovascular: Negative for chest pain and palpitations. Gastrointestinal: Positive for abdominal pain, diarrhea and vomiting. Negative for nausea. Endocrine: Negative for polydipsia, polyphagia and polyuria. Genitourinary: Negative for difficulty urinating, dysuria, flank pain and frequency. Musculoskeletal: Negative for back pain, myalgias, neck pain and neck stiffness. Skin: Negative for color change, pallor and rash. Neurological: Negative for dizziness, light-headedness and headaches. Psychiatric/Behavioral: Negative for agitation and behavioral problems. Physical Exam  Physical Exam  Constitutional:       General: She is not in acute distress. Appearance: She is well-developed. She is not ill-appearing, toxic-appearing or diaphoretic. HENT:      Head: Normocephalic and atraumatic. Mouth/Throat:      Mouth: Mucous membranes are moist.      Pharynx: No pharyngeal swelling or oropharyngeal exudate. Eyes:      General: No scleral icterus. Extraocular Movements: Extraocular movements intact.    Cardiovascular:      Rate and Rhythm: Normal rate and regular rhythm. Heart sounds: Normal heart sounds. No murmur heard. No friction rub. No gallop. Pulmonary:      Effort: Pulmonary effort is normal. No respiratory distress. Breath sounds: Normal breath sounds. No wheezing or rales. Chest:      Chest wall: No tenderness. Abdominal:      General: Abdomen is flat. Bowel sounds are normal.      Palpations: Abdomen is soft. There is no shifting dullness, fluid wave, hepatomegaly, splenomegaly, mass or pulsatile mass. Tenderness: There is abdominal tenderness in the epigastric area. There is no right CVA tenderness, left CVA tenderness, guarding or rebound. Negative signs include Rizvi's sign, Rovsing's sign, McBurney's sign, psoas sign and obturator sign. Hernia: No hernia is present. Musculoskeletal:         General: No tenderness or deformity. Normal range of motion. Cervical back: Normal range of motion and neck supple. Skin:     General: Skin is warm and dry. Capillary Refill: Capillary refill takes less than 2 seconds. Coloration: Skin is not cyanotic, jaundiced, mottled or pale. Findings: No erythema or rash. Neurological:      Mental Status: She is alert and oriented to person, place, and time.    Psychiatric:         Mood and Affect: Mood normal.         Behavior: Behavior normal.         Vital Signs  ED Triage Vitals   Temperature Pulse Respirations Blood Pressure SpO2   09/19/23 1933 09/19/23 1933 09/19/23 1933 09/19/23 1933 09/19/23 1933   98.4 °F (36.9 °C) (!) 118 18 165/86 99 %      Temp Source Heart Rate Source Patient Position - Orthostatic VS BP Location FiO2 (%)   09/19/23 1933 09/19/23 1933 09/19/23 1933 09/19/23 1933 --   Oral Monitor Lying Left arm       Pain Score       09/19/23 1952       10 - Worst Possible Pain           Vitals:    09/19/23 1933 09/19/23 2130 09/19/23 2300   BP: 165/86 121/70    Pulse: (!) 118 77 63   Patient Position - Orthostatic VS: Lying           Visual Acuity      ED Medications  Medications   morphine injection 4 mg (4 mg Intravenous Given 9/19/23 1952)   ondansetron (ZOFRAN) injection 4 mg (4 mg Intravenous Given 9/19/23 1952)   Famotidine (PF) (PEPCID) injection 20 mg (20 mg Intravenous Given 9/19/23 1952)   morphine injection 4 mg (4 mg Intravenous Given 9/19/23 2057)   iohexol (OMNIPAQUE) 350 MG/ML injection (SINGLE-DOSE) 100 mL (100 mL Intravenous Given 9/19/23 2122)       Diagnostic Studies  Results Reviewed     Procedure Component Value Units Date/Time    Comprehensive metabolic panel [[id number]]  (Abnormal) Collected: 09/19/23 2008    Lab Status: Final result Specimen: Blood from Arm, Left Updated: 09/19/23 2027     Sodium 139 mmol/L      Potassium 3.4 mmol/L      Chloride 102 mmol/L      CO2 28 mmol/L      ANION GAP 9 mmol/L      BUN 6 mg/dL      Creatinine 0.78 mg/dL      Glucose 113 mg/dL      Calcium 9.6 mg/dL      AST 19 U/L      ALT 22 U/L      Alkaline Phosphatase 95 U/L      Total Protein 8.1 g/dL      Albumin 4.6 g/dL      Total Bilirubin 0.34 mg/dL      eGFR 85 ml/min/1.73sq m     Narrative:      [place] guidelines for Chronic Kidney Disease (CKD):   •  Stage 1 with normal or high GFR (GFR > 90 mL/min/1.73 square meters)  •  Stage 2 Mild CKD (GFR = 60-89 mL/min/1.73 square meters)  •  Stage 3A Moderate CKD (GFR = 45-59 mL/min/1.73 square meters)  •  Stage 3B Moderate CKD (GFR = 30-44 mL/min/1.73 square meters)  •  Stage 4 Severe CKD (GFR = 15-29 mL/min/1.73 square meters)  •  Stage 5 End Stage CKD (GFR <15 mL/min/1.73 square meters)  Note: GFR calculation is accurate only with a steady state creatinine    Lipase [[id number]]  (Normal) Collected: 09/19/23 2008    Lab Status: Final result Specimen: Blood from Arm, Left Updated: 09/19/23 2027     Lipase 32 u/L     CBC and differential [[id number]]  (Abnormal) Collected: 09/19/23 2008    Lab Status: Final result Specimen: Blood from Arm, Left Updated: 09/19/23 2013     WBC 10.62 Thousand/uL      RBC 4.95 Million/uL      Hemoglobin 14.1 g/dL      Hematocrit 45.0 %      MCV 91 fL      MCH 28.5 pg      MCHC 31.3 g/dL      RDW 12.7 %      MPV 10.9 fL      Platelets 584 Thousands/uL      nRBC 0 /100 WBCs      Neutrophils Relative 53 %      Immat GRANS % 0 %      Lymphocytes Relative 38 %      Monocytes Relative 8 %      Eosinophils Relative 1 %      Basophils Relative 0 %      Neutrophils Absolute 5.54 Thousands/µL      Immature Grans Absolute 0.02 Thousand/uL      Lymphocytes Absolute 4.08 Thousands/µL      Monocytes Absolute 0.81 Thousand/µL      Eosinophils Absolute 0.13 Thousand/µL      Basophils Absolute 0.04 Thousands/µL                  CT abdomen pelvis with contrast   Final Result by Vilma Sauceda DO (09/19 2211)      Mild gastric wall thickening of the antrum, which may suggest gastritis in the appropriate clinical setting. Workstation performed: [id number]                    Procedures  Procedures         ED Course                               SBIRT 20yo+    Flowsheet Row Most Recent Value   Initial Alcohol Screen: US AUDIT-C     1. How often do you have a drink containing alcohol? 0 Filed at: 09/19/2023 1931   2. How many drinks containing alcohol do you have on a typical day you are drinking? 0 Filed at: 09/19/2023 1931   3b. FEMALE Any Age, or MALE 65+: How often do you have 4 or more drinks on one occassion? 0 Filed at: 09/19/2023 1931   Audit-C Score 0 Filed at: 09/19/2023 1931   MUMTAZ: How many times in the past year have you. .. Used an illegal drug or used a prescription medication for non-medical reasons? Never Filed at: 09/19/2023 1931                    Medical Decision Making  This is a 56years old with long history of diabetic gastroparesis, patient is followed by GI , and he saw him in his office yesterday and told him at that she needed EGD and into the pyloric injection of Botox which she can relieve the symptoms she has.   Patient complaining of epigastric pain. Patient vomited x1. And she stated that she is always having diarrhea. Patient has multiple abdominal surgeries including cholecystectomy, appendectomy, bowel resection, hysterectomy, physical exam pertinent for epigastric tenderness. labs reviewed and came back WNL. CT abdomen pelvis; Mild gastric wall thickening of the antrum, which may suggest gastritis in the appropriate clinical setting. Case discussed with the patient informed him about the finding of the CT and the labs. Patient has long history of diabetic gastroparesis and she is very closely followed by the GI. Patient instructed to to follow-up with Dr. Tory Ken as she was told to by him. Amount and/or Complexity of Data Reviewed  Labs: ordered. Details: Labs including CBC, CMP came back within normal limits. Radiology: ordered. Details: ct abdomen pelvis; mild gastric wall thickening of       Risk  Prescription drug management. Disposition  Final diagnoses:   Acute gastritis without hemorrhage, unspecified gastritis type   Gastroparesis   Epigastric pain     Time reflects when diagnosis was documented in both MDM as applicable and the Disposition within this note     Time User Action Codes Description Comment    9/19/2023 11:40 PM Delores Rodriguez Add [K29.70] Gastritis     9/19/2023 11:40 PM AbdDelores strong Remove [K29.70] Gastritis     9/19/2023 11:40 PM AbdDelores strong Add [K29.00] Acute gastritis without hemorrhage, unspecified gastritis type     9/19/2023 11:40 PM AbdBoogie strongeb Add [K31.84] Gastroparesis     9/19/2023 11:43 PM Delores Rodriguez Add [R10.13] Epigastric pain       ED Disposition     ED Disposition   Discharge    Condition   Stable    Date/Time   Tue Sep 19, 2023 11:45 PM    Comment   Saurav Nolen discharge to home/self care.                Follow-up Information     Follow up With Specialties Details Why Radha Mcclain MD Gastroenterology In 3 days  [address] [place] [address] [id number] [phone]            Discharge Medication List as of 9/19/2023 11:45 PM      START taking these medications    Details   oxyCODONE-acetaminophen (Percocet) 5-325 mg per tablet Take 1 tablet by mouth every 8 (eight) hours as needed for moderate pain for up to 6 days Max Daily Amount: 3 tablets, Starting Tue 9/19/2023, Until Mon 9/25/2023 at 2359, Normal         CONTINUE these medications which have NOT CHANGED    Details   Alcohol Swabs 70 % PADS May substitute brand based on insurance coverage. Check glucose TID., Normal      aluminum-magnesium hydroxide-simethicone (MAALOX) 1847-9838-476 mg/30 mL suspension Take 30 mL by mouth every 4 (four) hours as needed for indigestion or heartburn (gas), Starting Wed 9/6/2023, Normal      ARIPiprazole (ABILIFY) 10 mg tablet Take 1 tablet (10 mg total) by mouth daily, Starting Tue 11/8/2022, Normal      aspirin 81 mg chewable tablet Chew 1 tablet (81 mg total) daily Do not start before September 7, 2023., Starting Thu 9/7/2023, Until Sat 10/7/2023, Normal      atorvastatin (LIPITOR) 20 mg tablet Take 1 tablet (20 mg total) by mouth daily, Starting Wed 1/25/2023, Normal      Blood Glucose Monitoring Suppl (OneTouch Verio Reflect) w/Device KIT May substitute brand based on insurance coverage.  Check glucose TID., Normal      clonazePAM (KlonoPIN) 0.5 mg tablet Take 1 tablet (0.5 mg total) by mouth 2 (two) times a day as needed for seizures, Starting Wed 7/12/2023, Normal      !! docusate sodium (COLACE) 100 mg capsule Take 1 capsule (100 mg total) by mouth 2 (two) times a day for 14 days, Starting Thu 3/23/2023, Until Tue 9/19/2023, Normal      !! docusate sodium (COLACE) 100 mg capsule Take 1 capsule (100 mg total) by mouth 2 (two) times a day, Starting Tue 8/22/2023, Normal      ergocalciferol (VITAMIN D2) 50,000 units TAKE 1 CAPSULE BY MOUTH 1 TIME A WEEK, Normal      escitalopram (LEXAPRO) 10 mg tablet TAKE 1 TABLET(10 MG) BY MOUTH DAILY, Normal      gabapentin (NEURONTIN) 800 mg tablet TAKE 1 TABLET(800 MG) BY MOUTH THREE TIMES DAILY, Normal      hydrochlorothiazide (HYDRODIURIL) 12.5 mg tablet Take 1 tablet (12.5 mg total) by mouth daily, Starting Wed 1/4/2023, Normal      insulin glargine (LANTUS) 100 units/mL subcutaneous injection Inject 25 Units under the skin daily at bedtime, Starting Tue 11/8/2022, No Print      levothyroxine 25 mcg tablet TAKE 1 TABLET(25 MCG) BY MOUTH DAILY IN THE EARLY MORNING, Normal      metFORMIN (GLUCOPHAGE) 1000 MG tablet TAKE 1 TABLET(1000 MG) BY MOUTH TWICE DAILY WITH MEALS, Normal      metoclopramide (Reglan) 10 mg tablet Take 1 tablet (10 mg total) by mouth 2 (two) times a day before lunch and dinner, Starting Tue 5/30/2023, Normal      metoprolol tartrate (LOPRESSOR) 50 mg tablet TAKE 1 TABLET(50 MG) BY MOUTH EVERY 12 HOURS, Normal      !! Microlet Lancets MISC Starting Mon 1/3/2022, Historical Med      ondansetron (ZOFRAN) 4 mg tablet Take 1 tablet (4 mg total) by mouth every 8 (eight) hours as needed for nausea or vomiting, Starting Wed 5/3/2023, Normal      !! OneTouch Delica Lancets 27M MISC May substitute brand based on insurance coverage. Check glucose TID., Normal      pantoprazole (PROTONIX) 40 mg tablet TAKE 1 TABLET BY MOUTH TWICE DAILY, Normal      polyethylene glycol (MIRALAX) 17 g packet Take 17 g by mouth daily, Starting Wed 9/6/2023, Normal      sucralfate (CARAFATE) 1 g tablet TAKE 1 TABLET(1 GRAM) BY MOUTH FOUR TIMES DAILY, Normal      SUMAtriptan (IMITREX) 50 mg tablet Take 1 tablet (50 mg total) by mouth once as needed for migraine for up to 1 dose may repeat in 2 hours if necessary, Starting Mon 5/15/2023, Normal      traZODone (DESYREL) 100 mg tablet Take 2 tablets (200 mg total) by mouth daily at bedtime, Starting Fri 6/16/2023, Normal       !! - Potential duplicate medications found. Please discuss with provider. No discharge procedures on file.     PDMP Review       Value Time User    PDMP Reviewed  Yes 7/12/2023  4:04 PM Orville Joel, [address] [facility]          ED Provider  Electronically Signed by           Day Rosario MD  09/20/23 1816

## 2023-09-19 NOTE — PROGRESS NOTES
TCM Call     Date and time call was made  9/7/2023  9:24 AM    Hospital care reviewed  Records reviewed    Patient was hospitialized at  [address]    Date of Admission  09/02/23    Date of discharge  09/06/23    Diagnosis  Abdominal pain    Disposition  Home    Were the patients medications reviewed and updated  Yes    Current Symptoms  None    Vomitting severity  Moderate  patient stated she vomites in the morning only      TCM Call     Post hospital issues  None    Should patient be enrolled in anticoag monitoring? No    Scheduled for follow up?   Yes    Patients specialists  --  gastroenterologist    Did you obtain your prescribed medications  Yes    Do you need help managing your prescriptions or medications  No    Is transportation to your appointment needed  No    I have advised the patient to call PCP with any new or worsening symptoms  Joy [facility]

## 2023-09-20 NOTE — DISCHARGE INSTRUCTIONS
Follow-up with GI doctors nausea. Take medication as prescribed. Labs Reviewed   COMPREHENSIVE METABOLIC PANEL - Abnormal       Result Value Ref Range Status    Sodium 139  135 - 147 mmol/L Final    Potassium 3.4 (*) 3.5 - 5.3 mmol/L Final    Chloride 102  96 - 108 mmol/L Final    CO2 28  21 - 32 mmol/L Final    ANION GAP 9  mmol/L Final    BUN 6  5 - 25 mg/dL Final    Creatinine 0.78  0.60 - 1.30 mg/dL Final    Comment: Standardized to IDMS reference method    Glucose 113  65 - 140 mg/dL Final    Comment: If the patient is fasting, the ADA then defines impaired fasting glucose as > 100 mg/dL and diabetes as > or equal to 123 mg/dL. Calcium 9.6  8.4 - 10.2 mg/dL Final    AST 19  13 - 39 U/L Final    Comment: Slightly Hemolyzed:Results may be affected. ALT 22  7 - 52 U/L Final    Comment: Specimen collection should occur prior to Sulfasalazine administration due to the potential for falsely depressed results. Alkaline Phosphatase 95  34 - 104 U/L Final    Total Protein 8.1  6.4 - 8.4 g/dL Final    Albumin 4.6  3.5 - 5.0 g/dL Final    Total Bilirubin 0.34  0.20 - 1.00 mg/dL Final    Comment: Use of this assay is not recommended for patients undergoing treatment with eltrombopag due to the potential for falsely elevated results. N-acetyl-p-benzoquinone imine (metabolite of Acetaminophen) will generate erroneously low results in samples for patients that have taken an overdose of Acetaminophen.     eGFR 85  ml/min/1.73sq m Final    Narrative:     [place] guidelines for Chronic Kidney Disease (CKD):     Stage 1 with normal or high GFR (GFR > 90 mL/min/1.73 square meters)    Stage 2 Mild CKD (GFR = 60-89 mL/min/1.73 square meters)    Stage 3A Moderate CKD (GFR = 45-59 mL/min/1.73 square meters)    Stage 3B Moderate CKD (GFR = 30-44 mL/min/1.73 square meters)    Stage 4 Severe CKD (GFR = 15-29 mL/min/1.73 square meters)    Stage 5 End Stage CKD (GFR <15 mL/min/1.73 square meters)  Note: GFR calculation is accurate only with a steady state creatinine   CBC AND DIFFERENTIAL - Abnormal    WBC 10.62 (*) 4.31 - 10.16 Thousand/uL Final    RBC 4.95  3.81 - 5.12 Million/uL Final    Hemoglobin 14.1  11.5 - 15.4 g/dL Final    Hematocrit 45.0  34.8 - 46.1 % Final    MCV 91  82 - 98 fL Final    MCH 28.5  26.8 - 34.3 pg Final    MCHC 31.3 (*) 31.4 - 37.4 g/dL Final    RDW 12.7  11.6 - 15.1 % Final    MPV 10.9  8.9 - 12.7 fL Final    Platelets 999  193 - 390 Thousands/uL Final    nRBC 0  /100 WBCs Final    Neutrophils Relative 53  43 - 75 % Final    Immat GRANS % 0  0 - 2 % Final    Lymphocytes Relative 38  14 - 44 % Final    Monocytes Relative 8  4 - 12 % Final    Eosinophils Relative 1  0 - 6 % Final    Basophils Relative 0  0 - 1 % Final    Neutrophils Absolute 5.54  1.85 - 7.62 Thousands/µL Final    Immature Grans Absolute 0.02  0.00 - 0.20 Thousand/uL Final    Lymphocytes Absolute 4.08  0.60 - 4.47 Thousands/µL Final    Monocytes Absolute 0.81  0.17 - 1.22 Thousand/µL Final    Eosinophils Absolute 0.13  0.00 - 0.61 Thousand/µL Final    Basophils Absolute 0.04  0.00 - 0.10 Thousands/µL Final   LIPASE - Normal    Lipase 32  11 - 82 u/L Final     CT abdomen pelvis with contrast   Final Result      Mild gastric wall thickening of the antrum, which may suggest gastritis in the appropriate clinical setting.             Workstation performed: [id number]

## 2023-09-20 NOTE — PROGRESS NOTES
TRANSITION OF CARE OFFICE VISIT  [facility] - [facility] PRIMARY CARE [place]    NAME: Diamond Pringle  AGE: 56 y.o. SEX: female  : 1965     DATE: 2023     Assessment and Plan:     Problem List Items Addressed This Visit        Other    Abdominal pain    Alcohol abuse, in remission    Bipolar 1 disorder, depressed ([address])   Other Visit Diagnoses     Hospital discharge follow-up    -  Primary    [facility] ([place]) - abdominal pain, slurred speech, gastroparesis    Polypharmacy          continued abd pain-slightly improved  Planned botox per UGI to treat gastroparesis per [place] ppi  Strongly encouraged smoking cessation  Continue stool softeners  [place]inch diet  Call with relapse of s/s    I have spent a total time of 30 minutes on in caring for this patient including Diagnostic results, Prognosis, Risks and benefits of tx options, Instructions for management, Patient and family education, Importance of tx compliance, Risk factor reductions, Impressions, Counseling / Coordination of care, Documenting in the medical record, Reviewing / ordering tests, medicine, procedures   and Obtaining or reviewing history  . Transitional Care Management Review:     Diamond Pringle is a 56 y.o. female here for TCM follow-up    During the TCM phone call patient stated:    TCM Call     Date and time call was made  2023  9:24 AM    Hospital care reviewed  Records reviewed    Patient was hospitialized at  [address]    Date of Admission  23    Date of discharge  23    Diagnosis  Abdominal pain    Disposition  Home    Were the patients medications reviewed and updated  Yes    Current Symptoms  None    Vomitting severity  Moderate  patient stated she vomites in the morning only      TCM Call     Post hospital issues  None    Should patient be enrolled in anticoag monitoring? No    Scheduled for follow up?   Yes    Patients specialists  --  gastroenterologist    Did you obtain your prescribed medications  Yes    Do you need help managing your prescriptions or medications  No    Is transportation to your appointment needed  No    I have advised the patient to call PCP with any new or worsening symptoms  Joy Pearles           HPI:     Seen for discharge follow up  Presented to [facility] on 9/2/23 with severe abdominal pain  She has ongoing gastroparesis, constipation issues  Prior to discharge home she was reported to have slurred speech with facial droop. Stroke w/u MRI, CTA head/neck neg. Neuro did not feel it was consistent with neurovasc event but consistent with chronic polypharm    The following portions of the patient's history were reviewed and updated as appropriate: allergies, current medications, past family history, past medical history, past social history, past surgical history and problem list.     Review of Systems:     Review of Systems   Constitutional: Positive for fatigue. Negative for fever. HENT: Negative for trouble swallowing. Respiratory: Negative for cough and shortness of breath. Cardiovascular: Negative for chest pain and palpitations. Gastrointestinal: Positive for abdominal pain and constipation. Negative for abdominal distention, blood in stool, nausea and vomiting. Genitourinary: Negative for difficulty urinating and dysuria. Neurological: Negative for dizziness and weakness.         Problem List:     Patient Active Problem List   Diagnosis   • Abdominal pain   • Essential hypertension   • Alcohol abuse, in remission   • Post-traumatic stress disorder, chronic   • Generalized anxiety disorder   • Elevated lactic acid level   • Bipolar disorder current episode depressed ([address])   • Bipolar 1 disorder, depressed ([address])   • Benzodiazepine dependence, continuous (HCC)   • Non compliance w medication regimen   • Acquired hypothyroidism   • Hypokalemia   • Hyperlipemia   • Transaminitis   • Cognitive dysfunction in bipolar disorder [facility])   • Medical clearance for psychiatric admission   • Tobacco abuse   • Toxic encephalopathy   • Gastroesophageal reflux disease without esophagitis   • Increased anion gap metabolic acidosis   • STI (sexually transmitted infection)   • Chest pressure   • Diabetes mellitus type 2 in obese (HCC)   • Primary hypertension   • Hypothyroidism   • Vitamin D deficiency   • Tiredness   • Obesity, morbid (HCC)   • Tachypnea   • Hypertensive urgency   • Tachycardia   • Bronchitis with acute wheezing   • Cigarette smoker   • Chronic cough   • Screening for blood or protein in urine   • Pulmonary emphysema, unspecified emphysema type (HCC)   • Long-term use of high-risk medication   • Migraine without aura and without status migrainosus, not intractable   • Dysarthria        Objective:     /86 (BP Location: Left arm, Patient Position: Sitting, Cuff Size: Standard)   Pulse 76   Temp (!) 97.3 °F (36.3 °C) (Temporal)   Resp 16   Ht 5' 3" (1.6 m)   Wt 71.7 kg (158 lb)   SpO2 97%   BMI 27.99 kg/m²     Physical Exam  Vitals and nursing note reviewed. Constitutional:       General: She is not in acute distress. Appearance: Normal appearance. Cardiovascular:      Rate and Rhythm: Normal rate and regular rhythm. Pulses: Normal pulses. Pulmonary:      Effort: Pulmonary effort is normal.      Breath sounds: Normal breath sounds. Abdominal:      General: Bowel sounds are normal.      Palpations: Abdomen is soft. Tenderness: There is abdominal tenderness. There is no guarding. Skin:     Coloration: Skin is not pale. Neurological:      General: No focal deficit present. Mental Status: She is alert. Motor: No weakness. Gait: Gait normal.   Psychiatric:         Mood and Affect: Mood normal.          Laboratory Results: I have personally reviewed the pertinent laboratory results/reports     Radiology/Other Diagnostic Testing Results: I have personally reviewed pertinent reports.       XR abdomen 1 view kub    Result Date: 3/18/2023  ABDOMEN INDICATION:   abd pain. COMPARISON:  CT abdomen pelvis exam dated one day prior and abdomen radiographs dated 1/25/2023 VIEWS:  AP supine FINDINGS: There is a nonspecific nonobstructive bowel gas pattern. No discernible free air on this supine study. Upright or left lateral decubitus imaging is more sensitive to detect subtle free air in the appropriate setting. No pathologic calcifications or soft tissue masses. Surgical clips seen in the right upper quadrant and pelvis Visualized lung bases are clear. Visualized osseous structures are unremarkable for the patient's age. Nonspecific nonobstructive bowel gas pattern. Workstation performed: [id number]     CT abdomen pelvis with contrast    Result Date: 3/17/2023  CT ABDOMEN AND PELVIS WITH IV CONTRAST INDICATION:   generalized abdominal pain, nausea, constipation. COMPARISON:  Most recent prior CT scan is dated January 25, 2023. TECHNIQUE:  CT examination of the abdomen and pelvis was performed. Axial, sagittal, and coronal 2D reformatted images were created from the source data and submitted for interpretation. Radiation dose length product (DLP) for this visit:  524 mGy-cm . This examination, like all CT scans performed in the [facility], was performed utilizing techniques to minimize radiation dose exposure, including the use of iterative reconstruction and automated exposure control. IV Contrast:  100 mL of iohexol (OMNIPAQUE) Enteric Contrast:  Enteric contrast was not administered. FINDINGS: ABDOMEN LOWER CHEST:  Unremarkable. LIVER/BILIARY TREE:  Few low-attenuation lesions scattered throughout the liver which have been unchanged since at least 2000 compatible with a benign etiology. GALLBLADDER:  Gallbladder is surgically absent. SPLEEN:  Unremarkable. PANCREAS:  Unremarkable. ADRENAL GLANDS:  Unremarkable. KIDNEYS/URETERS:  Unremarkable. No hydronephrosis.  STOMACH AND BOWEL:  Fluid-filled small bowel and colon with no bowel wall thickening or hyperemia of the bowel wall, findings which could be related to laxative use. Mild gastric wall thickening and mucosal hyperemia suggestive of gastritis. No adjacent inflammatory fat stranding. APPENDIX:  No findings to suggest appendicitis. ABDOMINOPELVIC CAVITY:  No ascites. No pneumoperitoneum. No lymphadenopathy. VESSELS:  Unremarkable for patient's age. PELVIS REPRODUCTIVE ORGANS:  Surgical changes of prior hysterectomy. URINARY BLADDER:  Unremarkable. ABDOMINAL WALL/INGUINAL REGIONS:  Unremarkable. OSSEOUS STRUCTURES:  No acute fracture or destructive osseous lesion. Findings concerning for gastritis. Recommend correlation with patient's symptoms. Fluid-filled colon and multiple small bowel loops but no wall thickening or hyperemia suggestive of laxative use. Workstation performed: [id number]        Current Medications:     Outpatient Medications Prior to Visit   Medication Sig Dispense Refill   • Alcohol Swabs 70 % PADS May substitute brand based on insurance coverage. Check glucose TID. 100 each 0   • aluminum-magnesium hydroxide-simethicone (MAALOX) 8318-1338-207 mg/30 mL suspension Take 30 mL by mouth every 4 (four) hours as needed for indigestion or heartburn (gas) 355 mL 0   • ARIPiprazole (ABILIFY) 10 mg tablet Take 1 tablet (10 mg total) by mouth daily 30 tablet 0   • aspirin 81 mg chewable tablet Chew 1 tablet (81 mg total) daily Do not start before September 7, 2023. 30 tablet 0   • atorvastatin (LIPITOR) 20 mg tablet Take 1 tablet (20 mg total) by mouth daily 90 tablet 1   • Blood Glucose Monitoring Suppl (OneTouch Verio Reflect) w/Device KIT May substitute brand based on insurance coverage.  Check glucose TID. 1 kit 0   • clonazePAM (KlonoPIN) 0.5 mg tablet Take 1 tablet (0.5 mg total) by mouth 2 (two) times a day as needed for seizures 60 tablet 0   • docusate sodium (COLACE) 100 mg capsule Take 1 capsule (100 mg total) by mouth 2 (two) times a day for 14 days 28 capsule 0   • docusate sodium (COLACE) 100 mg capsule Take 1 capsule (100 mg total) by mouth 2 (two) times a day 60 capsule 0   • ergocalciferol (VITAMIN D2) 50,000 units TAKE 1 CAPSULE BY MOUTH 1 TIME A WEEK 12 capsule 1   • escitalopram (LEXAPRO) 10 mg tablet TAKE 1 TABLET(10 MG) BY MOUTH DAILY 90 tablet 1   • gabapentin (NEURONTIN) 800 mg tablet TAKE 1 TABLET(800 MG) BY MOUTH THREE TIMES DAILY 90 tablet 1   • hydrochlorothiazide (HYDRODIURIL) 12.5 mg tablet Take 1 tablet (12.5 mg total) by mouth daily 90 tablet 2   • levothyroxine 25 mcg tablet TAKE 1 TABLET(25 MCG) BY MOUTH DAILY IN THE EARLY MORNING 90 tablet 3   • metFORMIN (GLUCOPHAGE) 1000 MG tablet TAKE 1 TABLET(1000 MG) BY MOUTH TWICE DAILY WITH MEALS 180 tablet 1   • metoclopramide (Reglan) 10 mg tablet Take 1 tablet (10 mg total) by mouth 2 (two) times a day before lunch and dinner 60 tablet 1   • metoprolol tartrate (LOPRESSOR) 50 mg tablet TAKE 1 TABLET(50 MG) BY MOUTH EVERY 12 HOURS 60 tablet 5   • Microlet Lancets MISC      • ondansetron (ZOFRAN) 4 mg tablet Take 1 tablet (4 mg total) by mouth every 8 (eight) hours as needed for nausea or vomiting 20 tablet 0   • OneTouch Delica Lancets 30V MISC May substitute brand based on insurance coverage. Check glucose TID.  100 each 0   • pantoprazole (PROTONIX) 40 mg tablet TAKE 1 TABLET BY MOUTH TWICE DAILY 180 tablet 0   • sucralfate (CARAFATE) 1 g tablet TAKE 1 TABLET(1 GRAM) BY MOUTH FOUR TIMES DAILY 120 tablet 1   • SUMAtriptan (IMITREX) 50 mg tablet Take 1 tablet (50 mg total) by mouth once as needed for migraine for up to 1 dose may repeat in 2 hours if necessary 10 tablet 0   • traZODone (DESYREL) 100 mg tablet Take 2 tablets (200 mg total) by mouth daily at bedtime 60 tablet 2   • insulin glargine (LANTUS) 100 units/mL subcutaneous injection Inject 25 Units under the skin daily at bedtime (Patient not taking: Reported on 9/7/2023) 10 mL 0   • polyethylene glycol (MIRALAX) 17 g packet Take 17 g by mouth daily (Patient not taking: Reported on 9/18/2023) 30 each 0   • promethazine (PHENERGAN) 12.5 mg suppository Insert 1 suppository (12.5 mg total) into the rectum every 6 (six) hours as needed for nausea or vomiting (Patient not taking: Reported on 9/7/2023) 12 each 0   • semaglutide, 1 mg/dose, (Ozempic) 4 mg/3 mL injection pen Inject 0.75 mL (1 mg total) under the skin every 7 days (Patient not taking: Reported on 9/7/2023) 3 mL 5     No facility-administered medications prior to visit.        YONG Wise  [facility]

## 2023-09-21 ENCOUNTER — PATIENT OUTREACH (OUTPATIENT)
Dept: CASE MANAGEMENT | Facility: OTHER | Age: 58
End: 2023-09-21

## 2023-09-21 ENCOUNTER — VBI (OUTPATIENT)
Dept: FAMILY MEDICINE CLINIC | Facility: CLINIC | Age: 58
End: 2023-09-21

## 2023-09-21 NOTE — PROGRESS NOTES
I spoke with patient who reports she had gastric pain this morning. She took her pain medication and it relieved the pain. She was seen by her PCP 9/19. Per note patient is scheduled with gastroenterologist for Botox to treat gastroparesis. Patient confirmed she has an appointment on 10/10 for the procedure. She reports she has no needs at present. She has my contact information and I advised her to call if she needs assistance.

## 2023-09-21 NOTE — TELEPHONE ENCOUNTER
09/21/23 9:29 AM    Patient contacted post ED visit, [facility] department spoke with patient/caregiver and outreach was successful. Thank you.   Chantale Holland MA  [facility]

## 2023-09-26 DIAGNOSIS — I10 PRIMARY HYPERTENSION: ICD-10-CM

## 2023-09-26 DIAGNOSIS — E55.9 VITAMIN D DEFICIENCY: ICD-10-CM

## 2023-09-26 RX ORDER — HYDROCHLOROTHIAZIDE 12.5 MG/1
TABLET ORAL
Qty: 90 TABLET | Refills: 1 | Status: SHIPPED | OUTPATIENT
Start: 2023-09-26

## 2023-09-26 RX ORDER — ERGOCALCIFEROL 1.25 MG/1
CAPSULE ORAL
Qty: 12 CAPSULE | Refills: 1 | Status: SHIPPED | OUTPATIENT
Start: 2023-09-26

## 2023-09-27 ENCOUNTER — TELEPHONE (OUTPATIENT)
Age: 58
End: 2023-09-27

## 2023-09-29 ENCOUNTER — VBI (OUTPATIENT)
Dept: ADMINISTRATIVE | Facility: OTHER | Age: 58
End: 2023-09-29

## 2023-10-04 DIAGNOSIS — F31.4 BIPOLAR DISORDER, CURRENT EPISODE DEPRESSED, SEVERE, WITHOUT PSYCHOTIC FEATURES (HCC): ICD-10-CM

## 2023-10-06 ENCOUNTER — NURSE TRIAGE (OUTPATIENT)
Age: 58
End: 2023-10-06

## 2023-10-06 ENCOUNTER — TELEPHONE (OUTPATIENT)
Dept: GASTROENTEROLOGY | Facility: CLINIC | Age: 58
End: 2023-10-06

## 2023-10-06 ENCOUNTER — TELEPHONE (OUTPATIENT)
Age: 58
End: 2023-10-06

## 2023-10-06 DIAGNOSIS — E11.43 DIABETIC GASTROPARESIS ASSOCIATED WITH TYPE 2 DIABETES MELLITUS: ICD-10-CM

## 2023-10-06 DIAGNOSIS — F31.4 BIPOLAR DISORDER, CURRENT EPISODE DEPRESSED, SEVERE, WITHOUT PSYCHOTIC FEATURES (HCC): ICD-10-CM

## 2023-10-06 DIAGNOSIS — K31.84 DIABETIC GASTROPARESIS ASSOCIATED WITH TYPE 2 DIABETES MELLITUS: ICD-10-CM

## 2023-10-06 DIAGNOSIS — J40 BRONCHITIS: ICD-10-CM

## 2023-10-06 RX ORDER — CLONAZEPAM 0.5 MG/1
0.5 TABLET ORAL DAILY PRN
Qty: 30 TABLET | Refills: 0 | Status: SHIPPED | OUTPATIENT
Start: 2023-10-06

## 2023-10-06 RX ORDER — PANTOPRAZOLE SODIUM 40 MG/1
40 TABLET, DELAYED RELEASE ORAL 2 TIMES DAILY
Qty: 180 TABLET | Refills: 0 | Status: SHIPPED | OUTPATIENT
Start: 2023-10-06 | End: 2023-11-02 | Stop reason: SDUPTHER

## 2023-10-06 RX ORDER — METOCLOPRAMIDE 10 MG/1
10 TABLET ORAL 2 TIMES DAILY
Qty: 90 TABLET | Refills: 0 | Status: SHIPPED | OUTPATIENT
Start: 2023-10-06 | End: 2023-11-01 | Stop reason: SDUPTHER

## 2023-10-06 NOTE — TELEPHONE ENCOUNTER
Spoke to pt confirming pt's egd scheduled on 10/10/23 at [facility] with Dr. Inga Lam. Informed [facility] would be calling 1 day prior with the arrival time. Reviewed instructions and pt did not have any questions.

## 2023-10-06 NOTE — TELEPHONE ENCOUNTER
Please advise  Last OV: 9/18/23 Dr. Jennie Garland   Hx: diabetic gastroparesis, epigastric pain     Patient calling in, reports nausea and upper right abdominal pain starting at 3 am rates pain 10/10. Declined ED evaluation. She is on pantoprazole 40 mg daily, Carafate QID, and reglan TID. Miralax nightly- last BM two days ago. She explains the ED last time provided rx for oxycodone. I advised her GI does not prescribe this and to stay on a clear liquid diet and continue medications. She would like to know other recommendations for her pain, she doesn't want to go to the ED.

## 2023-10-06 NOTE — TELEPHONE ENCOUNTER
Pt significant other called in in regards to request a medication refill for a inactive medication Clonazepam 0.5mg, 2 times a day as needed to the [facility] in [facility]. [employer] Pregnancy And Lactation Text: This medication is Pregnancy Category B and is considered safe. There is some data to suggest avoiding during the third trimester. It is unknown if this medication is excreted in breast milk.

## 2023-10-06 NOTE — TELEPHONE ENCOUNTER
Called and spoke to patient extensively. Unfortunately she has very severe gastroparesis. She has been taking medicines as recommended with Reglan, Carafate, PPI and MiraLAX. She reports progressive bloating throughout the day. She has no nausea and vomiting. She reports the pain she is having currently is stable and chronic. I recommend she continue a clear liquid diet for now until symptoms start to improve. However I did report I would have a low threshold for her to go to the emergency room if her symptoms worsen. She reports she would be agreeable to do this if anything changes. I discussed EGD with Botox procedure which she has on Tuesday and possible G POEM after that if no improvement of her symptoms.

## 2023-10-06 NOTE — TELEPHONE ENCOUNTER
Reason for Disposition  • Abdominal pain is a chronic symptom (recurrent or ongoing AND lasting > 4 weeks)    Protocols used: ABDOMINAL PAIN - UPPER-ADULT-OH

## 2023-10-09 ENCOUNTER — VBI (OUTPATIENT)
Dept: ADMINISTRATIVE | Facility: OTHER | Age: 58
End: 2023-10-09

## 2023-10-10 ENCOUNTER — HOSPITAL ENCOUNTER (OUTPATIENT)
Dept: GASTROENTEROLOGY | Facility: HOSPITAL | Age: 58
Setting detail: OUTPATIENT SURGERY
Discharge: HOME | End: 2023-10-10
Attending: INTERNAL MEDICINE
Payer: COMMERCIAL

## 2023-10-10 ENCOUNTER — ANESTHESIA (OUTPATIENT)
Dept: GASTROENTEROLOGY | Facility: HOSPITAL | Age: 58
End: 2023-10-10

## 2023-10-10 ENCOUNTER — ANESTHESIA EVENT (OUTPATIENT)
Dept: GASTROENTEROLOGY | Facility: HOSPITAL | Age: 58
End: 2023-10-10

## 2023-10-10 VITALS
TEMPERATURE: 98 F | DIASTOLIC BLOOD PRESSURE: 78 MMHG | HEIGHT: 63 IN | RESPIRATION RATE: 16 BRPM | BODY MASS INDEX: 27.82 KG/M2 | SYSTOLIC BLOOD PRESSURE: 140 MMHG | OXYGEN SATURATION: 96 % | WEIGHT: 157 LBS | HEART RATE: 66 BPM

## 2023-10-10 DIAGNOSIS — K29.70 GASTRITIS WITHOUT BLEEDING, UNSPECIFIED CHRONICITY, UNSPECIFIED GASTRITIS TYPE: Primary | ICD-10-CM

## 2023-10-10 DIAGNOSIS — E11.43 DIABETIC GASTROPARESIS: ICD-10-CM

## 2023-10-10 DIAGNOSIS — K31.84 DIABETIC GASTROPARESIS: ICD-10-CM

## 2023-10-10 DIAGNOSIS — R10.13 EPIGASTRIC PAIN: ICD-10-CM

## 2023-10-10 LAB — GLUCOSE SERPL-MCNC: 138 MG/DL (ref 65–140)

## 2023-10-10 PROCEDURE — 82948 REAGENT STRIP/BLOOD GLUCOSE: CPT

## 2023-10-10 PROCEDURE — 43236 UPPR GI SCOPE W/SUBMUC INJ: CPT | Performed by: INTERNAL MEDICINE

## 2023-10-10 PROCEDURE — 88305 TISSUE EXAM BY PATHOLOGIST: CPT | Performed by: PATHOLOGY

## 2023-10-10 PROCEDURE — 43251 EGD REMOVE LESION SNARE: CPT | Performed by: INTERNAL MEDICINE

## 2023-10-10 RX ORDER — LIDOCAINE HYDROCHLORIDE 20 MG/ML
INJECTION, SOLUTION EPIDURAL; INFILTRATION; INTRACAUDAL; PERINEURAL AS NEEDED
Status: DISCONTINUED | OUTPATIENT
Start: 2023-10-10 | End: 2023-10-10

## 2023-10-10 RX ORDER — PROPOFOL 10 MG/ML
INJECTION, EMULSION INTRAVENOUS AS NEEDED
Status: DISCONTINUED | OUTPATIENT
Start: 2023-10-10 | End: 2023-10-10

## 2023-10-10 RX ORDER — SODIUM CHLORIDE, SODIUM LACTATE, POTASSIUM CHLORIDE, CALCIUM CHLORIDE 600; 310; 30; 20 MG/100ML; MG/100ML; MG/100ML; MG/100ML
INJECTION, SOLUTION INTRAVENOUS CONTINUOUS PRN
Status: DISCONTINUED | OUTPATIENT
Start: 2023-10-10 | End: 2023-10-10

## 2023-10-10 RX ADMIN — PROPOFOL 100 MG: 10 INJECTION, EMULSION INTRAVENOUS at 09:08

## 2023-10-10 RX ADMIN — ONABOTULINUMTOXINA 200 UNITS: 100 INJECTION, POWDER, LYOPHILIZED, FOR SOLUTION INTRADERMAL; INTRAMUSCULAR at 09:11

## 2023-10-10 RX ADMIN — SODIUM CHLORIDE, SODIUM LACTATE, POTASSIUM CHLORIDE, AND CALCIUM CHLORIDE: .6; .31; .03; .02 INJECTION, SOLUTION INTRAVENOUS at 08:33

## 2023-10-10 RX ADMIN — LIDOCAINE HYDROCHLORIDE 100 MG: 20 INJECTION, SOLUTION EPIDURAL; INFILTRATION; INTRACAUDAL; PERINEURAL at 09:08

## 2023-10-10 RX ADMIN — PROPOFOL 50 MG: 10 INJECTION, EMULSION INTRAVENOUS at 09:09

## 2023-10-10 RX ADMIN — PROPOFOL 25 MG: 10 INJECTION, EMULSION INTRAVENOUS at 09:11

## 2023-10-10 RX ADMIN — PROPOFOL 25 MG: 10 INJECTION, EMULSION INTRAVENOUS at 09:10

## 2023-10-10 NOTE — ANESTHESIA POSTPROCEDURE EVALUATION
Post-Op Assessment Note    CV Status:  Stable    Pain management: adequate     Mental Status:  Sleepy   Hydration Status:  Euvolemic   PONV Controlled:  Controlled   Airway Patency:  Patent      Post Op Vitals Reviewed: Yes      Staff: CRNA         No notable events documented.     BP      Temp   98   Pulse  75   Resp   16   SpO2   100

## 2023-10-10 NOTE — ANESTHESIA PREPROCEDURE EVALUATION
Procedure:  EGD    Relevant Problems   CARDIO   (+) Essential hypertension   (+) Hyperlipemia   (+) Hypertensive urgency   (+) Migraine without aura and without status migrainosus, not intractable   (+) Primary hypertension      ENDO   (+) Acquired hypothyroidism   (+) Diabetes mellitus type 2 in obese    (+) Hypothyroidism      GI/HEPATIC   (+) Gastroesophageal reflux disease without esophagitis      NEURO/PSYCH   (+) Bipolar disorder current episode depressed (HCC)   (+) Generalized anxiety disorder   (+) Migraine without aura and without status migrainosus, not intractable   (+) Post-traumatic stress disorder, chronic      PULMONARY   (+) Cigarette smoker   (+) Pulmonary emphysema, unspecified emphysema type (HCC)        Physical Exam    Airway    Mallampati score: II  TM Distance: >3 FB  Neck ROM: full     Dental       Cardiovascular      Pulmonary      Other Findings  Poor dentition      Anesthesia Plan  ASA Score- 3     Anesthesia Type- IV sedation with anesthesia with ASA Monitors. Additional Monitors:   Airway Plan:     Comment: Denies nausea or vomiting all day. Plan Factors-Exercise tolerance (METS): >4 METS. Chart reviewed. Patient is not a current smoker. Patient did not smoke on day of surgery. Obstructive sleep apnea risk education given perioperatively. Induction- intravenous. Postoperative Plan-     Informed Consent- Anesthetic plan and risks discussed with patient. I personally reviewed this patient with the CRNA. Discussed and agreed on the Anesthesia Plan with the CRNA. .        NPO appropriate. Discussed benefits/risks of monitored anesthetic care and discussed providing a dynamic level of mild to deep sedation. Risks include awareness, airway obstruction, aspiration which may necessitate conversion to general anesthesia. All questions answered. Patient understands and wishes to proceed.     Anesthesia plan and consent discussed with Vianne Kayser who expressed understanding and agreement. Risks/benefits and alternatives discussed with patient including possible PONV, sore throat, damage to teeth/lips/gums and possibility of rare anesthetic and surgical emergencies.

## 2023-10-10 NOTE — H&P
History and Physical - [facility]  Clydene Habermann 66 y.o. female MRN: [id number]                  HPI: Clydene Habermann is a 66y.o. year old female who presents for abdominal pain, postprandial bloating, nausea and vomiting, history of gastroparesis      REVIEW OF SYSTEMS: Per the HPI, and otherwise unremarkable. Historical Information   Past Medical History:   Diagnosis Date   • Addiction to drug [facility])    • Adjustment disorder    • Alcohol abuse    • Alcoholism ([address])    • Anxiety    • Bipolar disorder ([address])    • Bowel obstruction ([address])    • Depression    • Diabetes type 2, controlled ([address])    • Disease of thyroid gland    • Gastritis    • Head injury    • Heart palpitations    • Hyperlipidemia    • Hypertension    • Hypothyroidism    • Psychiatric illness    • PTSD (post-traumatic stress disorder)    • Seizure ([address])    • Seizures ([address])    • Sleep difficulties    • Substance abuse ([address])    • Suicide attempt ([address])    • Withdrawal symptoms, drug or narcotic ([address])      Past Surgical History:   Procedure Laterality Date   • ABDOMINAL SURGERY     • APPENDECTOMY     • BOWEL RESECTION     • CHOLECYSTECTOMY     • ESOPHAGOGASTRODUODENOSCOPY N/A 05/18/2018    Procedure: ESOPHAGOGASTRODUODENOSCOPY (EGD) with bx;  Surgeon: Shayla Burrell DO;  Location: [facility];   Service: Gastroenterology   • HYSTERECTOMY     • KNEE SURGERY Bilateral 1991     Social History   Social History     Substance and Sexual Activity   Alcohol Use Not Currently   • Alcohol/week: 6.0 standard drinks of alcohol   • Types: 6 Cans of beer per week    Comment: last drink on 08/15/20     Social History     Substance and Sexual Activity   Drug Use Never     Social History     Tobacco Use   Smoking Status Every Day   • Packs/day: 0.50   • Years: 25.00   • Total pack years: 12.50   • Types: Cigarettes   • Passive exposure: Current   Smokeless Tobacco Never   Tobacco Comments    smokes like 5 a day(06/24/2022)     Family History Problem Relation Age of Onset   • Bipolar disorder Mother    • Cancer Father    • Diabetes Father        Meds/Allergies     (Not in a hospital admission)      Allergies   Allergen Reactions   • Losartan Cough   • Latex Rash       Objective     /80   Pulse 70   Temp 98.1 °F (36.7 °C) (Temporal)   Resp 20   Ht 5' 3" (1.6 m)   Wt 71.2 kg (157 lb)   SpO2 95%   BMI 27.81 kg/m²       PHYSICAL EXAM    Gen: NAD  CV: RRR  CHEST: Clear  ABD: soft, NT/ND  EXT: no edema      ASSESSMENT/PLAN:  This is a 66y.o. year old female here for EGD with Botox injection and she is stable and optimized for her procedure.

## 2023-10-11 ENCOUNTER — PATIENT OUTREACH (OUTPATIENT)
Dept: CASE MANAGEMENT | Facility: OTHER | Age: 58
End: 2023-10-11

## 2023-10-11 RX ORDER — SUCRALFATE 1 G/1
1 TABLET ORAL 4 TIMES DAILY
Qty: 120 TABLET | Refills: 1 | Status: SHIPPED | OUTPATIENT
Start: 2023-10-11

## 2023-10-11 NOTE — PROGRESS NOTES
I called and spoke with patient who had an EGD yesterday. She is having abdominal pain but she feels it is the same discomfort she had before the procedure. We reviewed the discharge instructions with attention to laying on the right side and using mild heat to the abdomen. Also walking short distances in the house to relieve bloating. She does not have a follow up appointment but I advised her the office should call her with the biopsy report. She wants to lay down now. I advised her to call me if I can assist her. I will follow up with her next week.

## 2023-10-13 ENCOUNTER — TELEPHONE (OUTPATIENT)
Age: 58
End: 2023-10-13

## 2023-10-13 PROCEDURE — 88305 TISSUE EXAM BY PATHOLOGIST: CPT | Performed by: PATHOLOGY

## 2023-10-13 NOTE — TELEPHONE ENCOUNTER
Patient called and stated she was unable to  prescription Clonazepam it is on back order at [facility]. Advised patient to call local pharmacy's  to find who has this medication. Tried to call [facility] but was on hold for  rover five minutes.

## 2023-10-15 ENCOUNTER — APPOINTMENT (EMERGENCY)
Dept: RADIOLOGY | Facility: HOSPITAL | Age: 58
DRG: 074 | End: 2023-10-15
Payer: COMMERCIAL

## 2023-10-15 ENCOUNTER — APPOINTMENT (EMERGENCY)
Dept: CT IMAGING | Facility: HOSPITAL | Age: 58
DRG: 074 | End: 2023-10-15
Payer: COMMERCIAL

## 2023-10-15 ENCOUNTER — HOSPITAL ENCOUNTER (INPATIENT)
Facility: HOSPITAL | Age: 58
LOS: 3 days | Discharge: HOME | DRG: 074 | End: 2023-10-19
Attending: EMERGENCY MEDICINE | Admitting: INTERNAL MEDICINE
Payer: COMMERCIAL

## 2023-10-15 DIAGNOSIS — I10 PRIMARY HYPERTENSION: ICD-10-CM

## 2023-10-15 DIAGNOSIS — K31.84 DIABETIC GASTROPARESIS: ICD-10-CM

## 2023-10-15 DIAGNOSIS — R10.13 EPIGASTRIC PAIN: Primary | ICD-10-CM

## 2023-10-15 DIAGNOSIS — E11.43 DIABETIC GASTROPARESIS: ICD-10-CM

## 2023-10-15 LAB
ALBUMIN SERPL BCP-MCNC: 4.6 G/DL (ref 3.5–5)
ALP SERPL-CCNC: 77 U/L (ref 34–104)
ALT SERPL W P-5'-P-CCNC: 14 U/L (ref 7–52)
ANION GAP SERPL CALCULATED.3IONS-SCNC: 9 MMOL/L
AST SERPL W P-5'-P-CCNC: 18 U/L (ref 13–39)
ATRIAL RATE: 67 BPM
BASOPHILS # BLD AUTO: 0.03 THOUSANDS/ÂΜL (ref 0–0.1)
BASOPHILS NFR BLD AUTO: 0 % (ref 0–1)
BILIRUB SERPL-MCNC: 0.45 MG/DL (ref 0.2–1)
BUN SERPL-MCNC: 12 MG/DL (ref 5–25)
CALCIUM SERPL-MCNC: 9.8 MG/DL (ref 8.4–10.2)
CARDIAC TROPONIN I PNL SERPL HS: 3 NG/L
CHLORIDE SERPL-SCNC: 101 MMOL/L (ref 96–108)
CO2 SERPL-SCNC: 28 MMOL/L (ref 21–32)
CREAT SERPL-MCNC: 0.69 MG/DL (ref 0.6–1.3)
EOSINOPHIL # BLD AUTO: 0.1 THOUSAND/ÂΜL (ref 0–0.61)
EOSINOPHIL NFR BLD AUTO: 1 % (ref 0–6)
ERYTHROCYTE [DISTWIDTH] IN BLOOD BY AUTOMATED COUNT: 12.6 % (ref 11.6–15.1)
GFR SERPL CREATININE-BSD FRML MDRD: 97 ML/MIN/1.73SQ M
GLUCOSE SERPL-MCNC: 111 MG/DL (ref 65–140)
GLUCOSE SERPL-MCNC: 78 MG/DL (ref 65–140)
GLUCOSE SERPL-MCNC: 83 MG/DL (ref 65–140)
HCT VFR BLD AUTO: 40 % (ref 34.8–46.1)
HGB BLD-MCNC: 13.3 G/DL (ref 11.5–15.4)
IMM GRANULOCYTES # BLD AUTO: 0.03 THOUSAND/UL (ref 0–0.2)
IMM GRANULOCYTES NFR BLD AUTO: 0 % (ref 0–2)
LIPASE SERPL-CCNC: 13 U/L (ref 11–82)
LYMPHOCYTES # BLD AUTO: 3.1 THOUSANDS/ÂΜL (ref 0.6–4.47)
LYMPHOCYTES NFR BLD AUTO: 31 % (ref 14–44)
MCH RBC QN AUTO: 28.9 PG (ref 26.8–34.3)
MCHC RBC AUTO-ENTMCNC: 33.3 G/DL (ref 31.4–37.4)
MCV RBC AUTO: 87 FL (ref 82–98)
MONOCYTES # BLD AUTO: 0.71 THOUSAND/ÂΜL (ref 0.17–1.22)
MONOCYTES NFR BLD AUTO: 7 % (ref 4–12)
NEUTROPHILS # BLD AUTO: 6.1 THOUSANDS/ÂΜL (ref 1.85–7.62)
NEUTS SEG NFR BLD AUTO: 61 % (ref 43–75)
NRBC BLD AUTO-RTO: 0 /100 WBCS
P AXIS: 49 DEGREES
PLATELET # BLD AUTO: 377 THOUSANDS/UL (ref 149–390)
PMV BLD AUTO: 9.6 FL (ref 8.9–12.7)
POTASSIUM SERPL-SCNC: 4 MMOL/L (ref 3.5–5.3)
PR INTERVAL: 214 MS
PROT SERPL-MCNC: 7.9 G/DL (ref 6.4–8.4)
QRS AXIS: 5 DEGREES
QRSD INTERVAL: 66 MS
QT INTERVAL: 416 MS
QTC INTERVAL: 439 MS
RBC # BLD AUTO: 4.6 MILLION/UL (ref 3.81–5.12)
SODIUM SERPL-SCNC: 138 MMOL/L (ref 135–147)
T WAVE AXIS: 32 DEGREES
VENTRICULAR RATE: 67 BPM
WBC # BLD AUTO: 10.07 THOUSAND/UL (ref 4.31–10.16)

## 2023-10-15 PROCEDURE — 96375 TX/PRO/DX INJ NEW DRUG ADDON: CPT

## 2023-10-15 PROCEDURE — C9113 INJ PANTOPRAZOLE SODIUM, VIA: HCPCS | Performed by: PHYSICIAN ASSISTANT

## 2023-10-15 PROCEDURE — 99285 EMERGENCY DEPT VISIT HI MDM: CPT | Performed by: PHYSICIAN ASSISTANT

## 2023-10-15 PROCEDURE — 82948 REAGENT STRIP/BLOOD GLUCOSE: CPT

## 2023-10-15 PROCEDURE — 96361 HYDRATE IV INFUSION ADD-ON: CPT

## 2023-10-15 PROCEDURE — 99284 EMERGENCY DEPT VISIT MOD MDM: CPT

## 2023-10-15 PROCEDURE — 96376 TX/PRO/DX INJ SAME DRUG ADON: CPT

## 2023-10-15 PROCEDURE — 36415 COLL VENOUS BLD VENIPUNCTURE: CPT | Performed by: PHYSICIAN ASSISTANT

## 2023-10-15 PROCEDURE — 84484 ASSAY OF TROPONIN QUANT: CPT | Performed by: PHYSICIAN ASSISTANT

## 2023-10-15 PROCEDURE — 83690 ASSAY OF LIPASE: CPT | Performed by: PHYSICIAN ASSISTANT

## 2023-10-15 PROCEDURE — 99222 1ST HOSP IP/OBS MODERATE 55: CPT | Performed by: INTERNAL MEDICINE

## 2023-10-15 PROCEDURE — 93005 ELECTROCARDIOGRAM TRACING: CPT

## 2023-10-15 PROCEDURE — 85025 COMPLETE CBC W/AUTO DIFF WBC: CPT | Performed by: PHYSICIAN ASSISTANT

## 2023-10-15 PROCEDURE — 80053 COMPREHEN METABOLIC PANEL: CPT | Performed by: PHYSICIAN ASSISTANT

## 2023-10-15 PROCEDURE — G1004 CDSM NDSC: HCPCS

## 2023-10-15 PROCEDURE — 93010 ELECTROCARDIOGRAM REPORT: CPT | Performed by: INTERNAL MEDICINE

## 2023-10-15 PROCEDURE — 74177 CT ABD & PELVIS W/CONTRAST: CPT

## 2023-10-15 PROCEDURE — 96374 THER/PROPH/DIAG INJ IV PUSH: CPT

## 2023-10-15 PROCEDURE — 71045 X-RAY EXAM CHEST 1 VIEW: CPT

## 2023-10-15 RX ORDER — FAMOTIDINE 10 MG/ML
20 INJECTION, SOLUTION INTRAVENOUS ONCE
Status: COMPLETED | OUTPATIENT
Start: 2023-10-15 | End: 2023-10-15

## 2023-10-15 RX ORDER — INSULIN LISPRO 100 [IU]/ML
1-5 INJECTION, SOLUTION INTRAVENOUS; SUBCUTANEOUS
Status: DISCONTINUED | OUTPATIENT
Start: 2023-10-15 | End: 2023-10-19 | Stop reason: HOSPADM

## 2023-10-15 RX ORDER — GABAPENTIN 400 MG/1
400 CAPSULE ORAL 3 TIMES DAILY
Status: DISCONTINUED | OUTPATIENT
Start: 2023-10-15 | End: 2023-10-19 | Stop reason: HOSPADM

## 2023-10-15 RX ORDER — METOCLOPRAMIDE 10 MG/1
10 TABLET ORAL 2 TIMES DAILY
Status: DISCONTINUED | OUTPATIENT
Start: 2023-10-15 | End: 2023-10-19 | Stop reason: HOSPADM

## 2023-10-15 RX ORDER — SIMETHICONE 80 MG
160 TABLET,CHEWABLE ORAL ONCE
Status: COMPLETED | OUTPATIENT
Start: 2023-10-15 | End: 2023-10-15

## 2023-10-15 RX ORDER — NICOTINE 21 MG/24HR
1 PATCH, TRANSDERMAL 24 HOURS TRANSDERMAL DAILY
Status: DISCONTINUED | OUTPATIENT
Start: 2023-10-15 | End: 2023-10-19 | Stop reason: HOSPADM

## 2023-10-15 RX ORDER — MAGNESIUM HYDROXIDE/ALUMINUM HYDROXICE/SIMETHICONE 120; 1200; 1200 MG/30ML; MG/30ML; MG/30ML
15 SUSPENSION ORAL ONCE
Status: COMPLETED | OUTPATIENT
Start: 2023-10-15 | End: 2023-10-15

## 2023-10-15 RX ORDER — DIPHENHYDRAMINE HYDROCHLORIDE AND LIDOCAINE HYDROCHLORIDE AND ALUMINUM HYDROXIDE AND MAGNESIUM HYDRO
10 KIT ONCE
Status: COMPLETED | OUTPATIENT
Start: 2023-10-15 | End: 2023-10-15

## 2023-10-15 RX ORDER — MAGNESIUM HYDROXIDE/ALUMINUM HYDROXICE/SIMETHICONE 120; 1200; 1200 MG/30ML; MG/30ML; MG/30ML
30 SUSPENSION ORAL ONCE
Status: DISCONTINUED | OUTPATIENT
Start: 2023-10-15 | End: 2023-10-15

## 2023-10-15 RX ORDER — CLONAZEPAM 0.5 MG/1
0.5 TABLET ORAL DAILY PRN
Status: DISCONTINUED | OUTPATIENT
Start: 2023-10-15 | End: 2023-10-19 | Stop reason: HOSPADM

## 2023-10-15 RX ORDER — INSULIN GLARGINE 100 [IU]/ML
25 INJECTION, SOLUTION SUBCUTANEOUS
Status: DISCONTINUED | OUTPATIENT
Start: 2023-10-15 | End: 2023-10-19 | Stop reason: HOSPADM

## 2023-10-15 RX ORDER — ESCITALOPRAM OXALATE 10 MG/1
10 TABLET ORAL DAILY
Status: DISCONTINUED | OUTPATIENT
Start: 2023-10-15 | End: 2023-10-19 | Stop reason: HOSPADM

## 2023-10-15 RX ORDER — ONDANSETRON 2 MG/ML
4 INJECTION INTRAMUSCULAR; INTRAVENOUS EVERY 6 HOURS PRN
Status: DISCONTINUED | OUTPATIENT
Start: 2023-10-15 | End: 2023-10-19 | Stop reason: HOSPADM

## 2023-10-15 RX ORDER — POLYETHYLENE GLYCOL 3350 17 G/17G
17 POWDER, FOR SOLUTION ORAL DAILY
Status: DISCONTINUED | OUTPATIENT
Start: 2023-10-15 | End: 2023-10-19 | Stop reason: HOSPADM

## 2023-10-15 RX ORDER — ARIPIPRAZOLE 5 MG/1
10 TABLET ORAL DAILY
Status: DISCONTINUED | OUTPATIENT
Start: 2023-10-15 | End: 2023-10-19 | Stop reason: HOSPADM

## 2023-10-15 RX ORDER — LEVOTHYROXINE SODIUM 0.03 MG/1
25 TABLET ORAL
Status: DISCONTINUED | OUTPATIENT
Start: 2023-10-16 | End: 2023-10-19 | Stop reason: HOSPADM

## 2023-10-15 RX ORDER — METOPROLOL TARTRATE 50 MG/1
50 TABLET, FILM COATED ORAL EVERY 12 HOURS SCHEDULED
Status: DISCONTINUED | OUTPATIENT
Start: 2023-10-15 | End: 2023-10-19 | Stop reason: HOSPADM

## 2023-10-15 RX ORDER — ATORVASTATIN CALCIUM 20 MG/1
20 TABLET, FILM COATED ORAL DAILY
Status: DISCONTINUED | OUTPATIENT
Start: 2023-10-15 | End: 2023-10-19 | Stop reason: HOSPADM

## 2023-10-15 RX ORDER — METOCLOPRAMIDE HYDROCHLORIDE 5 MG/ML
10 INJECTION INTRAMUSCULAR; INTRAVENOUS ONCE
Status: COMPLETED | OUTPATIENT
Start: 2023-10-15 | End: 2023-10-15

## 2023-10-15 RX ORDER — DIPHENHYDRAMINE HYDROCHLORIDE 50 MG/ML
12.5 INJECTION INTRAMUSCULAR; INTRAVENOUS ONCE
Status: COMPLETED | OUTPATIENT
Start: 2023-10-15 | End: 2023-10-15

## 2023-10-15 RX ORDER — TRAZODONE HYDROCHLORIDE 100 MG/1
200 TABLET ORAL
Status: DISCONTINUED | OUTPATIENT
Start: 2023-10-15 | End: 2023-10-19 | Stop reason: HOSPADM

## 2023-10-15 RX ORDER — DOCUSATE SODIUM 100 MG/1
100 CAPSULE, LIQUID FILLED ORAL 2 TIMES DAILY
Status: DISCONTINUED | OUTPATIENT
Start: 2023-10-15 | End: 2023-10-19 | Stop reason: HOSPADM

## 2023-10-15 RX ORDER — DICYCLOMINE HYDROCHLORIDE 10 MG/1
20 CAPSULE ORAL 3 TIMES DAILY
Status: DISCONTINUED | OUTPATIENT
Start: 2023-10-15 | End: 2023-10-18

## 2023-10-15 RX ORDER — ASPIRIN 81 MG/1
81 TABLET, CHEWABLE ORAL DAILY
Status: DISCONTINUED | OUTPATIENT
Start: 2023-10-15 | End: 2023-10-19 | Stop reason: HOSPADM

## 2023-10-15 RX ORDER — SUCRALFATE 1 G/1
1 TABLET ORAL
Status: DISCONTINUED | OUTPATIENT
Start: 2023-10-15 | End: 2023-10-16

## 2023-10-15 RX ORDER — MAGNESIUM HYDROXIDE/ALUMINUM HYDROXICE/SIMETHICONE 120; 1200; 1200 MG/30ML; MG/30ML; MG/30ML
30 SUSPENSION ORAL EVERY 6 HOURS PRN
Status: DISCONTINUED | OUTPATIENT
Start: 2023-10-15 | End: 2023-10-18

## 2023-10-15 RX ORDER — ENOXAPARIN SODIUM 100 MG/ML
40 INJECTION SUBCUTANEOUS DAILY
Status: DISCONTINUED | OUTPATIENT
Start: 2023-10-15 | End: 2023-10-17

## 2023-10-15 RX ORDER — SODIUM CHLORIDE, SODIUM LACTATE, POTASSIUM CHLORIDE, CALCIUM CHLORIDE 600; 310; 30; 20 MG/100ML; MG/100ML; MG/100ML; MG/100ML
75 INJECTION, SOLUTION INTRAVENOUS CONTINUOUS
Status: DISCONTINUED | OUTPATIENT
Start: 2023-10-15 | End: 2023-10-19

## 2023-10-15 RX ORDER — SENNOSIDES 8.6 MG
1 TABLET ORAL DAILY
Status: DISCONTINUED | OUTPATIENT
Start: 2023-10-15 | End: 2023-10-19 | Stop reason: HOSPADM

## 2023-10-15 RX ORDER — PANTOPRAZOLE SODIUM 40 MG/10ML
40 INJECTION, POWDER, LYOPHILIZED, FOR SOLUTION INTRAVENOUS ONCE
Status: COMPLETED | OUTPATIENT
Start: 2023-10-15 | End: 2023-10-15

## 2023-10-15 RX ADMIN — DICYCLOMINE HYDROCHLORIDE 20 MG: 10 CAPSULE ORAL at 21:38

## 2023-10-15 RX ADMIN — ASPIRIN 81 MG CHEWABLE TABLET 81 MG: 81 TABLET CHEWABLE at 17:12

## 2023-10-15 RX ADMIN — SODIUM CHLORIDE, SODIUM LACTATE, POTASSIUM CHLORIDE, AND CALCIUM CHLORIDE 75 ML/HR: .6; .31; .03; .02 INJECTION, SOLUTION INTRAVENOUS at 17:14

## 2023-10-15 RX ADMIN — ARIPIPRAZOLE 10 MG: 5 TABLET ORAL at 17:12

## 2023-10-15 RX ADMIN — IOHEXOL 100 ML: 350 INJECTION, SOLUTION INTRAVENOUS at 13:59

## 2023-10-15 RX ADMIN — ALUMINUM HYDROXIDE, MAGNESIUM HYDROXIDE, AND SIMETHICONE 15 ML: 200; 200; 20 SUSPENSION ORAL at 13:28

## 2023-10-15 RX ADMIN — ALUMINUM HYDROXIDE, MAGNESIUM HYDROXIDE, AND SIMETHICONE 30 ML: 200; 200; 20 SUSPENSION ORAL at 21:43

## 2023-10-15 RX ADMIN — SODIUM CHLORIDE 1000 ML: 0.9 INJECTION, SOLUTION INTRAVENOUS at 12:44

## 2023-10-15 RX ADMIN — MORPHINE SULFATE 2 MG: 2 INJECTION, SOLUTION INTRAMUSCULAR; INTRAVENOUS at 14:25

## 2023-10-15 RX ADMIN — DIPHENHYDRAMINE HYDROCHLORIDE AND LIDOCAINE HYDROCHLORIDE AND ALUMINUM HYDROXIDE AND MAGNESIUM HYDRO 10 ML: KIT at 14:38

## 2023-10-15 RX ADMIN — NICOTINE 1 PATCH: 14 PATCH, EXTENDED RELEASE TRANSDERMAL at 17:14

## 2023-10-15 RX ADMIN — GABAPENTIN 400 MG: 400 CAPSULE ORAL at 21:38

## 2023-10-15 RX ADMIN — SIMETHICONE 160 MG: 80 TABLET, CHEWABLE ORAL at 12:50

## 2023-10-15 RX ADMIN — SUCRALFATE 1 G: 1 TABLET ORAL at 17:12

## 2023-10-15 RX ADMIN — DICYCLOMINE HYDROCHLORIDE 20 MG: 10 CAPSULE ORAL at 17:12

## 2023-10-15 RX ADMIN — METOPROLOL TARTRATE 50 MG: 50 TABLET, FILM COATED ORAL at 21:38

## 2023-10-15 RX ADMIN — DIPHENHYDRAMINE HYDROCHLORIDE 12.5 MG: 50 INJECTION, SOLUTION INTRAMUSCULAR; INTRAVENOUS at 12:46

## 2023-10-15 RX ADMIN — MORPHINE SULFATE 2 MG: 2 INJECTION, SOLUTION INTRAMUSCULAR; INTRAVENOUS at 17:14

## 2023-10-15 RX ADMIN — DOCUSATE SODIUM 100 MG: 100 CAPSULE, LIQUID FILLED ORAL at 17:21

## 2023-10-15 RX ADMIN — FAMOTIDINE 20 MG: 10 INJECTION, SOLUTION INTRAVENOUS at 12:49

## 2023-10-15 RX ADMIN — METOCLOPRAMIDE 10 MG: 10 TABLET ORAL at 17:21

## 2023-10-15 RX ADMIN — MORPHINE SULFATE 2 MG: 2 INJECTION, SOLUTION INTRAMUSCULAR; INTRAVENOUS at 12:47

## 2023-10-15 RX ADMIN — ENOXAPARIN SODIUM 40 MG: 40 INJECTION SUBCUTANEOUS at 17:13

## 2023-10-15 RX ADMIN — GABAPENTIN 400 MG: 400 CAPSULE ORAL at 17:12

## 2023-10-15 RX ADMIN — METOCLOPRAMIDE 10 MG: 5 INJECTION, SOLUTION INTRAMUSCULAR; INTRAVENOUS at 12:45

## 2023-10-15 RX ADMIN — ATORVASTATIN CALCIUM 20 MG: 20 TABLET, FILM COATED ORAL at 17:12

## 2023-10-15 RX ADMIN — INSULIN GLARGINE 25 UNITS: 100 INJECTION, SOLUTION SUBCUTANEOUS at 21:38

## 2023-10-15 RX ADMIN — PANTOPRAZOLE SODIUM 40 MG: 40 INJECTION, POWDER, FOR SOLUTION INTRAVENOUS at 14:22

## 2023-10-15 RX ADMIN — TRAZODONE HYDROCHLORIDE 200 MG: 100 TABLET ORAL at 21:38

## 2023-10-15 RX ADMIN — MORPHINE SULFATE 2 MG: 2 INJECTION, SOLUTION INTRAMUSCULAR; INTRAVENOUS at 21:35

## 2023-10-15 RX ADMIN — ESCITALOPRAM OXALATE 10 MG: 10 TABLET ORAL at 17:13

## 2023-10-15 RX ADMIN — SENNOSIDES 8.6 MG: 8.6 TABLET, FILM COATED ORAL at 17:12

## 2023-10-15 NOTE — H&P
[address]  H&P  Name: Clydene Habermann 66 y.o. female I MRN: [id number]  Unit/Bed#: ED 06 I Date of Admission: 10/15/2023   Date of Service: 10/15/2023 I Hospital Day: 0      Assessment/Plan   * Abdominal pain  Assessment & Plan  Intractable right upper quadrant and epigastric abdominal pain with reflux-like symptoms with history of gastroparesis and labs essentially normal with lipase normal and CT of the abdomen and pelvis with contrast shows no acute abdominal pelvic pathology. Abdominal pain likely secondary to Gastroparesis, avoid opiates, will give stool softeners, consult GI, patient had EGD and Botox injection on 9/18/2023. Plan was discussed with GI and consider Botox injection which might be too early, versus G-POEM procedure  Antiemetics. Will give pain management. Diabetes mellitus type 2 in obese   Assessment & Plan  Lab Results   Component Value Date    HGBA1C 5.5 09/03/2023       No results for input(s): "POCGLU" in the last 72 hours. Blood Sugar Average: Last 72 hrs:  Monitor blood glucose, last HbA1c was 5.5 as of 9/3/2023. Tobacco abuse  Assessment & Plan  Advised about smoking cessation and placed on nicotine patch    Acquired hypothyroidism  Assessment & Plan  Continue levothyroxine    Bipolar 1 disorder, depressed (HCC)  Assessment & Plan  Continue Lexapro and aripiprazole and trazodone    Essential hypertension  Assessment & Plan  Continue metoprolol           VTE Pharmacologic Prophylaxis:   Moderate Risk (Score 3-4) - Pharmacological DVT Prophylaxis Ordered: enoxaparin (Lovenox). Code Status: Level 1 - Full Code   Discussion with family: Patient declined call to . Anticipated Length of Stay: Patient will be admitted on an observation basis with an anticipated length of stay of less than 2 midnights secondary to intractable abdominal pain. Total Time Spent on Date of Encounter in care of patient: 45 mins.  This time was spent on one or more of the following: performing physical exam; counseling and coordination of care; obtaining or reviewing history; documenting in the medical record; reviewing/ordering tests, medications or procedures; communicating with other healthcare professionals and discussing with patient's family/caregivers. Chief Complaint: Abdominal pain for the past 2 days,    History of Present Illness:  Johnathan Ledesma is a 66 y.o. female with a PMH of gastroparesis, generalized anxiety disorder, depression, bipolar disorder, diabetes mellitus type 2 presents with complaints of right upper quadrant abdominal pain. Patient states the pain has been going on for the past 2 to 3 days which is severe in intensity intensity of 10/10. Has been having worsening epigastric pain with acid reflux. Patient follows up with Dr. Liam Carrasco outpatient. Patient had a EGD and Botox performed into the pyloric sphincter during the last admission. Patient denies of any chest pain but has having acid reflux symptoms. Denies of vomiting but has nausea. Patient also complains of decreased appetite. Denies of any fever chills or cough. Denies of sick contacts. Patient denies of any diarrhea or constipation. Review of Systems:  Review of Systems   Constitutional:  Positive for activity change and fatigue. Gastrointestinal:  Positive for abdominal pain and nausea. All other systems reviewed and are negative.       Past Medical and Surgical History:   Past Medical History:   Diagnosis Date    Addiction to drug ([address])     Adjustment disorder     Alcohol abuse     Alcoholism ([address])     Anxiety     Bipolar disorder ([address])     Bowel obstruction (HCC)     Depression     Diabetes type 2, controlled ([address])     Disease of thyroid gland     Gastritis     Head injury     Heart palpitations     Hyperlipidemia     Hypertension     Hypothyroidism     Psychiatric illness     PTSD (post-traumatic stress disorder)     Seizure ([address])     Seizures ([address])     Sleep difficulties     Substance abuse ([address])     Suicide attempt ([address])     Withdrawal symptoms, drug or narcotic ([address] [facility])        Past Surgical History:   Procedure Laterality Date    ABDOMINAL SURGERY      APPENDECTOMY      BOWEL RESECTION      CHOLECYSTECTOMY      ESOPHAGOGASTRODUODENOSCOPY N/A 05/18/2018    Procedure: ESOPHAGOGASTRODUODENOSCOPY (EGD) with bx;  Surgeon: Dheeraj Hopkins DO;  Location: [facility]; Service: Gastroenterology    HYSTERECTOMY      KNEE SURGERY Bilateral 1991       Meds/Allergies:  Prior to Admission medications    Medication Sig Start Date End Date Taking? Authorizing Provider   Alcohol Swabs 70 % PADS May substitute brand based on insurance coverage. Check glucose TID. 6/22/22  Yes Sarahi Adams PA-C   aluminum-magnesium hydroxide-simethicone (MAALOX) 5034-3332-662 mg/30 mL suspension Take 30 mL by mouth every 4 (four) hours as needed for indigestion or heartburn (gas) 9/6/23  Yes Calos Adams PA-C   ARIPiprazole (ABILIFY) 10 mg tablet Take 1 tablet (10 mg total) by mouth daily 11/8/22  Yes Shi Biggs MD   aspirin 81 mg chewable tablet Chew 1 tablet (81 mg total) daily Do not start before September 7, 2023. 9/7/23 10/15/23 Yes Saraih Adams PA-C   atorvastatin (LIPITOR) 20 mg tablet Take 1 tablet (20 mg total) by mouth daily 1/25/23  Yes YONG Goldman   Blood Glucose Monitoring Suppl (OneTouch Verio Reflect) w/Device KIT May substitute brand based on insurance coverage.  Check glucose TID. 6/22/22  Yes Calos Adams PA-C   clonazePAM (KlonoPIN) 0.5 mg tablet Take 1 tablet (0.5 mg total) by mouth daily as needed for anxiety 10/6/23  Yes YONG Cook   docusate sodium (COLACE) 100 mg capsule Take 1 capsule (100 mg total) by mouth 2 (two) times a day 8/22/23  Yes Kevin Leach   ergocalciferol (VITAMIN D2) 50,000 units TAKE 1 CAPSULE BY MOUTH 1 TIME A WEEK 9/26/23  Yes AYSHA Galindo DO   escitalopram (LEXAPRO) 10 mg tablet TAKE 1 TABLET(10 MG) BY MOUTH DAILY 2/1/23  Yes Arnie Davis MD   gabapentin (NEURONTIN) 800 mg tablet TAKE 1 TABLET(800 MG) BY MOUTH THREE TIMES DAILY 9/18/23  Yes YONG Cook   hydrochlorothiazide (HYDRODIURIL) 12.5 mg tablet TAKE 1 TABLET(12.5 MG) BY MOUTH DAILY 9/26/23  Yes YONG Cook   insulin glargine (LANTUS) 100 units/mL subcutaneous injection Inject 25 Units under the skin daily at bedtime 11/8/22  Yes Rachel Ibarra MD   levothyroxine 25 mcg tablet TAKE 1 TABLET(25 MCG) BY MOUTH DAILY IN THE EARLY MORNING 7/3/23  Yes YONG Cook   metFORMIN (GLUCOPHAGE) 1000 MG tablet TAKE 1 TABLET(1000 MG) BY MOUTH TWICE DAILY WITH MEALS 10/4/23  Yes YONG Cook   metoclopramide (Reglan) 10 mg tablet Take 1 tablet (10 mg total) by mouth 2 (two) times a day 10/6/23  Yes Tory Blanco PA-C   metoprolol tartrate (LOPRESSOR) 50 mg tablet TAKE 1 TABLET(50 MG) BY MOUTH EVERY 12 HOURS 5/8/23  Yes YONG Madden   Microlet Lancets [facility]  1/3/22  Yes Rj Hernandez MD   ondansetron (ZOFRAN) 4 mg tablet Take 1 tablet (4 mg total) by mouth every 8 (eight) hours as needed for nausea or vomiting 5/3/23  Yes YONG Nugent   OneTouch Delica Lancets 94C MISC May substitute brand based on insurance coverage.  Check glucose TID. 6/22/22  Yes Marcel Adams PA-C   pantoprazole (PROTONIX) 40 mg tablet Take 1 tablet (40 mg total) by mouth 2 (two) times a day 10/6/23  Yes Tory Blanco PA-C   sucralfate (CARAFATE) 1 g tablet Take 1 tablet (1 g total) by mouth 4 (four) times a day 10/11/23  Yes YONG Nugent   SUMAtriptan (IMITREX) 50 mg tablet Take 1 tablet (50 mg total) by mouth once as needed for migraine for up to 1 dose may repeat in 2 hours if necessary 5/15/23  Yes YONG Cook   traZODone (DESYREL) 100 mg tablet Take 2 tablets (200 mg total) by mouth daily at bedtime 6/16/23  Yes YONG Cook   docusate sodium (COLACE) 100 mg capsule Take 1 capsule (100 mg total) by mouth 2 (two) times a day for 14 days 3/23/23 9/19/23  Nolene December   polyethylene glycol (MIRALAX) 17 g packet Take 17 g by mouth daily  Patient not taking: Reported on 9/18/2023 9/6/23   Wilmer Mann PA-C     I have reviewed home medications with patient personally. Allergies: Allergies   Allergen Reactions    Losartan Cough    Latex Rash       Social History:  Marital Status:    Occupation: disabled   Patient Pre-hospital Living Situation: Home  Patient Pre-hospital Level of Mobility: walks  Patient Pre-hospital Diet Restrictions: nil  Substance Use History:   Social History     Substance and Sexual Activity   Alcohol Use Not Currently    Alcohol/week: 6.0 standard drinks of alcohol    Types: 6 Cans of beer per week    Comment: last drink on 08/15/20     Social History     Tobacco Use   Smoking Status Every Day    Packs/day: 0.50    Years: 25.00    Total pack years: 12.50    Types: Cigarettes    Passive exposure: Current   Smokeless Tobacco Never   Tobacco Comments    smokes like 5 a day(06/24/2022)     Social History     Substance and Sexual Activity   Drug Use Never       Family History:  Family History   Problem Relation Age of Onset    Bipolar disorder Mother     Cancer Father     Diabetes Father        Physical Exam:     Vitals:   Blood Pressure: 167/85 (10/15/23 1530)  Pulse: 81 (10/15/23 1530)  Temperature: 97.8 °F (36.6 °C) (10/15/23 1245)  Temp Source: Oral (10/15/23 1245)  Respirations: 22 (10/15/23 1530)  SpO2: 97 % (10/15/23 1530)    Physical Exam   HEENT-PERRLA, moist oral mucosa  Neck-supple, no JVD elevation   Respiratory-equal air entry bilaterally, no rales or rhonchi  Cardiovascular system-S1, S2 heard, no murmur or gallops or rubs  Abdomen-soft, tenderness noted in the right upper quadrant.   No guarding or rigidity, bowel sounds heard  Extremities-no pedal edema  Peripheral pulses palpable  Musculoskeletal-no contractures  Central nervous system-no acute focal neurological deficit ,no sensory or motor deficit noted. Skin-no rash noted       Additional Data:     Lab Results:  Results from last 7 days   Lab Units 10/15/23  1251   WBC Thousand/uL 10.07   HEMOGLOBIN g/dL 13.3   HEMATOCRIT % 40.0   PLATELETS Thousands/uL 377   NEUTROS PCT % 61   LYMPHS PCT % 31   MONOS PCT % 7   EOS PCT % 1     Results from last 7 days   Lab Units 10/15/23  1251   SODIUM mmol/L 138   POTASSIUM mmol/L 4.0   CHLORIDE mmol/L 101   CO2 mmol/L 28   BUN mg/dL 12   CREATININE mg/dL 0.69   ANION GAP mmol/L 9   CALCIUM mg/dL 9.8   ALBUMIN g/dL 4.6   TOTAL BILIRUBIN mg/dL 0.45   ALK PHOS U/L 77   ALT U/L 14   AST U/L 18   GLUCOSE RANDOM mg/dL 111         Results from last 7 days   Lab Units 10/10/23  0815   POC GLUCOSE mg/dl 138               Lines/Drains:  Invasive Devices       Peripheral Intravenous Line  Duration             Peripheral IV 10/15/23 Left Antecubital <1 day                        Imaging: Personally reviewed the following imaging: abdominal/pelvic CT  CT abdomen pelvis with contrast   Final Result by Viridiana Nunez MD (10/15 1779)      No acute abdominopelvic pathology. Workstation performed: [id number]         XR chest 1 view portable    (Results Pending)       EKG and Other Studies Reviewed on Admission:   EKG: NSR. HR no acute ST-T changes noted. ** Please Note: This note has been constructed using a voice recognition system.  **

## 2023-10-15 NOTE — PLAN OF CARE
Problem: PAIN - ADULT  Goal: Verbalizes/displays adequate comfort level or baseline comfort level  Description: Interventions:  - Encourage patient to monitor pain and request assistance  - Assess pain using appropriate pain scale  - Administer analgesics based on type and severity of pain and evaluate response  - Implement non-pharmacological measures as appropriate and evaluate response  - Consider cultural and social influences on pain and pain management  - Notify physician/advanced practitioner if interventions unsuccessful or patient reports new pain  Outcome: Progressing     Problem: SAFETY ADULT  Goal: Patient will remain free of falls  Description: INTERVENTIONS:  - Educate patient/family on patient safety including physical limitations  - Instruct patient to call for assistance with activity   - Consult OT/PT to assist with strengthening/mobility   - Keep Call bell within reach  - Keep bed low and locked with side rails adjusted as appropriate  - Keep care items and personal belongings within reach  - Initiate and maintain comfort rounds  - Make Fall Risk Sign visible to staff  - Offer Toileting every  2  Hours, in advance of need  Outcome: Progressing  Goal: Maintain or return to baseline ADL function  Description: INTERVENTIONS:  -  Assess patient's ability to carry out ADLs; assess patient's baseline for ADL function and identify physical deficits which impact ability to perform ADLs (bathing, care of mouth/teeth, toileting, grooming, dressing, etc.)  - Assess/evaluate cause of self-care deficits   - Assess range of motion  - Assess patient's mobility; develop plan if impaired  - Assess patient's need for assistive devices and provide as appropriate  - Encourage maximum independence but intervene and supervise when necessary  - Involve family in performance of ADLs  - Assess for home care needs following discharge   - Consider OT consult to assist with ADL evaluation and planning for discharge  - Provide patient education as appropriate  Outcome: Progressing     Problem: DISCHARGE PLANNING  Goal: Discharge to home or other facility with appropriate resources  Description: INTERVENTIONS:  - Identify barriers to discharge w/patient and caregiver  - Arrange for needed discharge resources and transportation as appropriate  - Identify discharge learning needs (meds, wound care, etc.)  - Arrange for interpretive services to assist at discharge as needed  - Refer to Case Management Department for coordinating discharge planning if the patient needs post-hospital services based on physician/advanced practitioner order or complex needs related to functional status, cognitive ability, or social support system  Outcome: Progressing     Problem: GASTROINTESTINAL - ADULT  Goal: Minimal or absence of nausea and/or vomiting  Description: INTERVENTIONS:  - Administer IV fluids if ordered to ensure adequate hydration  - Administer ordered antiemetic medications as needed  - Provide nonpharmacologic comfort measures as appropriate  - Advance diet as tolerated, if ordered  - Consider nutrition services referral to assist patient with adequate nutrition and appropriate food choices  Outcome: Progressing  Goal: Maintains or returns to baseline bowel function  Description: INTERVENTIONS:  - Assess bowel function  - Encourage oral fluids to ensure adequate hydration  - Administer IV fluids if ordered to ensure adequate hydration  - Administer ordered medications as needed  - Encourage mobilization and activity  - Consider nutritional services referral to assist patient with adequate nutrition and appropriate food choices  Outcome: Progressing  Goal: Maintains adequate nutritional intake  Description: INTERVENTIONS:  - Monitor percentage of each meal consumed  - Identify factors contributing to decreased intake, treat as appropriate  - Assist with meals as needed  - Monitor I&O, weight, and lab values if indicated  - Obtain nutrition services referral as needed  Outcome: Progressing     Problem: METABOLIC, FLUID AND ELECTROLYTES - ADULT  Goal: Electrolytes maintained within normal limits  Description: INTERVENTIONS:  - Monitor labs and assess patient for signs and symptoms of electrolyte imbalances  - Administer electrolyte replacement as ordered  - Monitor response to electrolyte replacements, including repeat lab results as appropriate  - Instruct patient on fluid and nutrition as appropriate  Outcome: Progressing  Goal: Fluid balance maintained  Description: INTERVENTIONS:  - Monitor labs   - Monitor I/O and WT  - Instruct patient on fluid and nutrition as appropriate  - Assess for signs & symptoms of volume excess or deficit  Outcome: Progressing  Goal: Glucose maintained within target range  Description: INTERVENTIONS:  - Monitor Blood Glucose as ordered  - Assess for signs and symptoms of hyperglycemia and hypoglycemia  - Administer ordered medications to maintain glucose within target range  - Assess nutritional intake and initiate nutrition service referral as needed  Outcome: Progressing

## 2023-10-15 NOTE — ASSESSMENT & PLAN NOTE
Intractable right upper quadrant and epigastric abdominal pain with reflux-like symptoms with history of gastroparesis and labs essentially normal with lipase normal and CT of the abdomen and pelvis with contrast shows no acute abdominal pelvic pathology. Abdominal pain likely secondary to Gastroparesis, avoid opiates, will give stool softeners, consult GI, patient had EGD and Botox injection on 9/18/2023. Plan was discussed with GI and consider Botox injection which might be too early, versus G-POEM procedure  Antiemetics. Will give pain management.

## 2023-10-15 NOTE — ASSESSMENT & PLAN NOTE
Lab Results   Component Value Date    HGBA1C 5.5 09/03/2023       No results for input(s): "POCGLU" in the last 72 hours. Blood Sugar Average: Last 72 hrs:  Monitor blood glucose, last HbA1c was 5.5 as of 9/3/2023.

## 2023-10-16 ENCOUNTER — PATIENT OUTREACH (OUTPATIENT)
Dept: CASE MANAGEMENT | Facility: OTHER | Age: 58
End: 2023-10-16

## 2023-10-16 ENCOUNTER — APPOINTMENT (INPATIENT)
Dept: ULTRASOUND IMAGING | Facility: HOSPITAL | Age: 58
DRG: 074 | End: 2023-10-16
Payer: COMMERCIAL

## 2023-10-16 LAB
ALBUMIN SERPL BCP-MCNC: 3.8 G/DL (ref 3.5–5)
ALP SERPL-CCNC: 121 U/L (ref 34–104)
ALT SERPL W P-5'-P-CCNC: 86 U/L (ref 7–52)
ANION GAP SERPL CALCULATED.3IONS-SCNC: 6 MMOL/L
AST SERPL W P-5'-P-CCNC: 76 U/L (ref 13–39)
BILIRUB SERPL-MCNC: 0.41 MG/DL (ref 0.2–1)
BUN SERPL-MCNC: 11 MG/DL (ref 5–25)
CALCIUM SERPL-MCNC: 9.1 MG/DL (ref 8.4–10.2)
CHLORIDE SERPL-SCNC: 104 MMOL/L (ref 96–108)
CO2 SERPL-SCNC: 27 MMOL/L (ref 21–32)
CREAT SERPL-MCNC: 0.59 MG/DL (ref 0.6–1.3)
GFR SERPL CREATININE-BSD FRML MDRD: 102 ML/MIN/1.73SQ M
GLUCOSE P FAST SERPL-MCNC: 94 MG/DL (ref 65–99)
GLUCOSE SERPL-MCNC: 103 MG/DL (ref 65–140)
GLUCOSE SERPL-MCNC: 199 MG/DL (ref 65–140)
GLUCOSE SERPL-MCNC: 93 MG/DL (ref 65–140)
GLUCOSE SERPL-MCNC: 94 MG/DL (ref 65–140)
GLUCOSE SERPL-MCNC: 97 MG/DL (ref 65–140)
LIPASE SERPL-CCNC: 7 U/L (ref 11–82)
MAGNESIUM SERPL-MCNC: 2.3 MG/DL (ref 1.9–2.7)
POTASSIUM SERPL-SCNC: 4.3 MMOL/L (ref 3.5–5.3)
PROT SERPL-MCNC: 6.5 G/DL (ref 6.4–8.4)
SODIUM SERPL-SCNC: 137 MMOL/L (ref 135–147)

## 2023-10-16 PROCEDURE — 99215 OFFICE O/P EST HI 40 MIN: CPT | Performed by: INTERNAL MEDICINE

## 2023-10-16 PROCEDURE — 90686 IIV4 VACC NO PRSV 0.5 ML IM: CPT | Performed by: INTERNAL MEDICINE

## 2023-10-16 PROCEDURE — 80053 COMPREHEN METABOLIC PANEL: CPT | Performed by: INTERNAL MEDICINE

## 2023-10-16 PROCEDURE — C9113 INJ PANTOPRAZOLE SODIUM, VIA: HCPCS | Performed by: NURSE PRACTITIONER

## 2023-10-16 PROCEDURE — G0008 ADMIN INFLUENZA VIRUS VAC: HCPCS | Performed by: INTERNAL MEDICINE

## 2023-10-16 PROCEDURE — 83735 ASSAY OF MAGNESIUM: CPT | Performed by: INTERNAL MEDICINE

## 2023-10-16 PROCEDURE — 82948 REAGENT STRIP/BLOOD GLUCOSE: CPT

## 2023-10-16 PROCEDURE — 76705 ECHO EXAM OF ABDOMEN: CPT

## 2023-10-16 PROCEDURE — 99232 SBSQ HOSP IP/OBS MODERATE 35: CPT

## 2023-10-16 PROCEDURE — 83690 ASSAY OF LIPASE: CPT | Performed by: INTERNAL MEDICINE

## 2023-10-16 RX ORDER — PANTOPRAZOLE SODIUM 40 MG/10ML
40 INJECTION, POWDER, LYOPHILIZED, FOR SOLUTION INTRAVENOUS EVERY 12 HOURS SCHEDULED
Status: DISCONTINUED | OUTPATIENT
Start: 2023-10-16 | End: 2023-10-17

## 2023-10-16 RX ORDER — SUCRALFATE 1 G/1
1 TABLET ORAL
Status: DISCONTINUED | OUTPATIENT
Start: 2023-10-16 | End: 2023-10-19 | Stop reason: HOSPADM

## 2023-10-16 RX ADMIN — INSULIN GLARGINE 25 UNITS: 100 INJECTION, SOLUTION SUBCUTANEOUS at 21:53

## 2023-10-16 RX ADMIN — MORPHINE SULFATE 2 MG: 2 INJECTION, SOLUTION INTRAMUSCULAR; INTRAVENOUS at 18:00

## 2023-10-16 RX ADMIN — SODIUM CHLORIDE, SODIUM LACTATE, POTASSIUM CHLORIDE, AND CALCIUM CHLORIDE 75 ML/HR: .6; .31; .03; .02 INJECTION, SOLUTION INTRAVENOUS at 08:46

## 2023-10-16 RX ADMIN — ENOXAPARIN SODIUM 40 MG: 40 INJECTION SUBCUTANEOUS at 08:26

## 2023-10-16 RX ADMIN — SENNOSIDES 8.6 MG: 8.6 TABLET, FILM COATED ORAL at 08:26

## 2023-10-16 RX ADMIN — PANTOPRAZOLE SODIUM 40 MG: 40 INJECTION, POWDER, FOR SOLUTION INTRAVENOUS at 11:36

## 2023-10-16 RX ADMIN — GABAPENTIN 400 MG: 400 CAPSULE ORAL at 17:33

## 2023-10-16 RX ADMIN — INFLUENZA VIRUS VACCINE 0.5 ML: 15; 15; 15; 15 SUSPENSION INTRAMUSCULAR at 08:40

## 2023-10-16 RX ADMIN — ESCITALOPRAM OXALATE 10 MG: 10 TABLET ORAL at 08:26

## 2023-10-16 RX ADMIN — SUCRALFATE 1 G: 1 TABLET ORAL at 21:53

## 2023-10-16 RX ADMIN — ARIPIPRAZOLE 10 MG: 5 TABLET ORAL at 08:26

## 2023-10-16 RX ADMIN — DICYCLOMINE HYDROCHLORIDE 20 MG: 10 CAPSULE ORAL at 08:26

## 2023-10-16 RX ADMIN — SUCRALFATE 1 G: 1 TABLET ORAL at 11:36

## 2023-10-16 RX ADMIN — MORPHINE SULFATE 2 MG: 2 INJECTION, SOLUTION INTRAMUSCULAR; INTRAVENOUS at 09:57

## 2023-10-16 RX ADMIN — ATORVASTATIN CALCIUM 20 MG: 20 TABLET, FILM COATED ORAL at 08:26

## 2023-10-16 RX ADMIN — ASPIRIN 81 MG CHEWABLE TABLET 81 MG: 81 TABLET CHEWABLE at 08:26

## 2023-10-16 RX ADMIN — METOPROLOL TARTRATE 50 MG: 50 TABLET, FILM COATED ORAL at 08:26

## 2023-10-16 RX ADMIN — TRAZODONE HYDROCHLORIDE 200 MG: 100 TABLET ORAL at 21:52

## 2023-10-16 RX ADMIN — GABAPENTIN 400 MG: 400 CAPSULE ORAL at 21:52

## 2023-10-16 RX ADMIN — MORPHINE SULFATE 2 MG: 2 INJECTION, SOLUTION INTRAMUSCULAR; INTRAVENOUS at 21:52

## 2023-10-16 RX ADMIN — PANTOPRAZOLE SODIUM 40 MG: 40 INJECTION, POWDER, FOR SOLUTION INTRAVENOUS at 21:53

## 2023-10-16 RX ADMIN — ALUMINUM HYDROXIDE, MAGNESIUM HYDROXIDE, AND SIMETHICONE 30 ML: 200; 200; 20 SUSPENSION ORAL at 05:37

## 2023-10-16 RX ADMIN — DOCUSATE SODIUM 100 MG: 100 CAPSULE, LIQUID FILLED ORAL at 17:33

## 2023-10-16 RX ADMIN — DOCUSATE SODIUM 100 MG: 100 CAPSULE, LIQUID FILLED ORAL at 08:26

## 2023-10-16 RX ADMIN — POLYETHYLENE GLYCOL 3350 17 G: 17 POWDER, FOR SOLUTION ORAL at 08:26

## 2023-10-16 RX ADMIN — LEVOTHYROXINE SODIUM 25 MCG: 25 TABLET ORAL at 05:32

## 2023-10-16 RX ADMIN — METOPROLOL TARTRATE 50 MG: 50 TABLET, FILM COATED ORAL at 21:52

## 2023-10-16 RX ADMIN — DICYCLOMINE HYDROCHLORIDE 20 MG: 10 CAPSULE ORAL at 21:52

## 2023-10-16 RX ADMIN — DICYCLOMINE HYDROCHLORIDE 20 MG: 10 CAPSULE ORAL at 17:33

## 2023-10-16 RX ADMIN — METOCLOPRAMIDE 10 MG: 10 TABLET ORAL at 08:26

## 2023-10-16 RX ADMIN — NICOTINE 1 PATCH: 14 PATCH, EXTENDED RELEASE TRANSDERMAL at 08:26

## 2023-10-16 RX ADMIN — ALUMINUM HYDROXIDE, MAGNESIUM HYDROXIDE, AND SIMETHICONE 30 ML: 200; 200; 20 SUSPENSION ORAL at 14:17

## 2023-10-16 RX ADMIN — SUCRALFATE 1 G: 1 TABLET ORAL at 05:37

## 2023-10-16 RX ADMIN — SUCRALFATE 1 G: 1 TABLET ORAL at 17:33

## 2023-10-16 RX ADMIN — MORPHINE SULFATE 2 MG: 2 INJECTION, SOLUTION INTRAMUSCULAR; INTRAVENOUS at 13:59

## 2023-10-16 RX ADMIN — METOCLOPRAMIDE 10 MG: 10 TABLET ORAL at 17:33

## 2023-10-16 RX ADMIN — MORPHINE SULFATE 2 MG: 2 INJECTION, SOLUTION INTRAMUSCULAR; INTRAVENOUS at 05:32

## 2023-10-16 RX ADMIN — GABAPENTIN 400 MG: 400 CAPSULE ORAL at 08:26

## 2023-10-16 RX ADMIN — INSULIN LISPRO 1 UNITS: 100 INJECTION, SOLUTION INTRAVENOUS; SUBCUTANEOUS at 21:53

## 2023-10-16 NOTE — ASSESSMENT & PLAN NOTE
Intractable right upper quadrant and epigastric abdominal pain with reflux-like symptoms   History of gastroparesis   labs essentially normal with lipase normal   CT of the abdomen and pelvis with contrast shows no acute abdominal pelvic pathology. Abdominal pain likely secondary to Gastroparesis  Slight Bump in LFT's  RUQ U/S: Ordered  CLD   Advance as tolerates  Avoid opiates, will give stool softeners  S/p Botox injection on 9/18/2023.    GI Consulted  C/w PPI/Reglan  Increase Carafate to 4 times daily  Continue to monitor while admitted

## 2023-10-16 NOTE — CONSULTS
Consultation - [address] [place] [address] [facility]  Julian Orta 66 y.o. female MRN: [id number]  Unit/Bed#: -01 Encounter: [id number]        Inpatient consult to gastroenterology  Consult performed by: YONG Watt  Consult ordered by: Kenya Dougherty, [address] [place] [address]          Reason for Consult / Principal Problem:     Epigastric pain      ASSESSMENT AND PLAN:      66 y.o. female with history of bipolar disorder, substance abuse, hypothyroidism, T2DM, HLD, HTN, cholecystectomy, gastroparesis with recent EGD botox 10/10 who presented 10/15 due to upper abdominal pain, reflux, and nausea. CBC, CMP, Lipase, CT abdomen pelvis unremarkable. #1 Upper abdominal pain  #2 Diabetic gastroparesis  #3 GERD  Upper abdominal pain likely multifactorial secondary to diabetic gastroparesis, GERD, and possible bile reflux. Discussed with patient that it could take weeks for Botox to take effect after intra pyloric Botox injection. She was previously on Ozempic which was stopped. LFTs mildly elevated this AM. She denies any vomiting but continues with intermittent upper abdominal pain.    -Continue clear liquid diet and advance to low-fat/low residue as tolerated  -Obtain RUQ US  -Continue Reglan twice daily  -Continue PPI twice daily  -Increase Carafate to 4 times daily dosing  -Avoid NSAIDs  -Avoid medications which slow GI motility such as narcotics, anticholinergics, etc.  -Continue supportive care with IVF, repletion of electrolytes as tolerated  -Monitor CBC, CMP    #4 Constipation  -Continue bowel regimen with MiraLAX  -Colon cancer screening up to date, lasta colonoscopy 3/2023    Thank you for the consultation.   Case discussed with Dr. Contreras Valero        ______________________________________________________________________    HPI:  Julian Orta is a 66 y.o. female with history of bipolar disorder, substance abuse, hypothyroidism, T2DM, HLD, HTN, cholecystectomy, gastroparesis who presented 10/15 due to upper abdominal pain, reflux, and nausea. She had recent EGD with intra pyloric Botox injection on 10/10 for treatment of diabetic gastroparesis and a subcentimeter polyp in the duodenal bulb was removed at that time. She had liquid food and a lot of bile noted in the stomach. Patient reports after EGD she had abdominal pain and diarrhea which subsided there after but then reoccurred 2 days prior to admission prompting ER visit. Pain is in the epigastric area and wraps around to the right upper quadrant and back and eating seems to make it worse. In the ER, she was afebrile. CBC CMP, lipase, troponin were normal and CAT scan abdomen pelvis with contrast showed no acute abdominopelvic pathology. REVIEW OF SYSTEMS:    CONSTITUTIONAL: Denies any fever, chills, rigors, and weight loss. HEENT: No earache or tinnitus. Denies hearing loss or visual disturbances. CARDIOVASCULAR: No chest pain or palpitations. RESPIRATORY: Denies any cough, hemoptysis, shortness of breath or dyspnea on exertion. GASTROINTESTINAL: As noted in the History of Present Illness. GENITOURINARY: No problems with urination. Denies any hematuria or dysuria. NEUROLOGIC: No dizziness or vertigo, denies headaches. MUSCULOSKELETAL: Denies any muscle or joint pain. SKIN: Denies skin rashes or itching. ENDOCRINE: Denies excessive thirst. Denies intolerance to heat or cold. PSYCHOSOCIAL: Denies depression or anxiety. Denies any recent memory loss.        Historical Information   Past Medical History:   Diagnosis Date    Addiction to drug ([address])     Adjustment disorder     Alcohol abuse     Alcoholism ([address])     Anxiety     Bipolar disorder ([address])     Bowel obstruction (HCC)     Depression     Diabetes type 2, controlled ([address])     Disease of thyroid gland     Gastritis     Head injury     Heart palpitations     Hyperlipidemia     Hypertension     Hypothyroidism     Psychiatric illness     PTSD (post-traumatic stress disorder)     Seizure ([address]) Seizures ([address])     Sleep difficulties     Substance abuse ([address])     Suicide attempt ([address])     Withdrawal symptoms, drug or narcotic ([address])      Past Surgical History:   Procedure Laterality Date    ABDOMINAL SURGERY      APPENDECTOMY      BOWEL RESECTION      CHOLECYSTECTOMY      ESOPHAGOGASTRODUODENOSCOPY N/A 05/18/2018    Procedure: ESOPHAGOGASTRODUODENOSCOPY (EGD) with bx;  Surgeon: Billye Gosselin, DO;  Location: [facility];   Service: Gastroenterology    HYSTERECTOMY      KNEE SURGERY Bilateral 1991     Social History   Social History     Substance and Sexual Activity   Alcohol Use Not Currently    Alcohol/week: 6.0 standard drinks of alcohol    Types: 6 Cans of beer per week    Comment: last drink on 08/15/20     Social History     Substance and Sexual Activity   Drug Use Never     Social History     Tobacco Use   Smoking Status Every Day    Packs/day: 0.50    Years: 25.00    Total pack years: 12.50    Types: Cigarettes    Passive exposure: Current   Smokeless Tobacco Never   Tobacco Comments    smokes like 5 a day(06/24/2022)     Family History   Problem Relation Age of Onset    Bipolar disorder Mother     Cancer Father     Diabetes Father        Meds/Allergies     Medications Prior to Admission   Medication    Alcohol Swabs 70 % PADS    aluminum-magnesium hydroxide-simethicone (MAALOX) 6260-8114-010 mg/30 mL suspension    ARIPiprazole (ABILIFY) 10 mg tablet    aspirin 81 mg chewable tablet    atorvastatin (LIPITOR) 20 mg tablet    Blood Glucose Monitoring Suppl (OneTouch Verio Reflect) w/Device KIT    clonazePAM (KlonoPIN) 0.5 mg tablet    docusate sodium (COLACE) 100 mg capsule    ergocalciferol (VITAMIN D2) 50,000 units    escitalopram (LEXAPRO) 10 mg tablet    gabapentin (NEURONTIN) 800 mg tablet    hydrochlorothiazide (HYDRODIURIL) 12.5 mg tablet    insulin glargine (LANTUS) 100 units/mL subcutaneous injection    levothyroxine 25 mcg tablet    metFORMIN (GLUCOPHAGE) 1000 MG tablet    metoclopramide (Reglan) 10 mg tablet    metoprolol tartrate (LOPRESSOR) 50 mg tablet    Microlet Lancets MISC    ondansetron (ZOFRAN) 4 mg tablet    OneTouch Delica Lancets 08V MISC    pantoprazole (PROTONIX) 40 mg tablet    sucralfate (CARAFATE) 1 g tablet    SUMAtriptan (IMITREX) 50 mg tablet    traZODone (DESYREL) 100 mg tablet    docusate sodium (COLACE) 100 mg capsule    polyethylene glycol (MIRALAX) 17 g packet     Current Facility-Administered Medications   Medication Dose Route Frequency    aluminum-magnesium hydroxide-simethicone (MAALOX) oral suspension 30 mL  30 mL Oral Q6H PRN    ARIPiprazole (ABILIFY) tablet 10 mg  10 mg Oral Daily    aspirin chewable tablet 81 mg  81 mg Oral Daily    atorvastatin (LIPITOR) tablet 20 mg  20 mg Oral Daily    clonazePAM (KlonoPIN) tablet 0.5 mg  0.5 mg Oral Daily PRN    dicyclomine (BENTYL) capsule 20 mg  20 mg Oral TID    docusate sodium (COLACE) capsule 100 mg  100 mg Oral BID    enoxaparin (LOVENOX) subcutaneous injection 40 mg  40 mg Subcutaneous Daily    escitalopram (LEXAPRO) tablet 10 mg  10 mg Oral Daily    gabapentin (NEURONTIN) capsule 400 mg  400 mg Oral TID    insulin glargine (LANTUS) subcutaneous injection 25 Units 0.25 mL  25 Units Subcutaneous HS    insulin lispro (HumaLOG) 100 units/mL subcutaneous injection 1-5 Units  1-5 Units Subcutaneous TID AC    insulin lispro (HumaLOG) 100 units/mL subcutaneous injection 1-5 Units  1-5 Units Subcutaneous HS    lactated ringers infusion  75 mL/hr Intravenous Continuous    levothyroxine tablet 25 mcg  25 mcg Oral Early Morning    metoclopramide (REGLAN) tablet 10 mg  10 mg Oral BID    metoprolol tartrate (LOPRESSOR) tablet 50 mg  50 mg Oral Q12H NACHO    morphine injection 2 mg  2 mg Intravenous Q4H PRN    nicotine (NICODERM CQ) 14 mg/24hr TD 24 hr patch 1 patch  1 patch Transdermal Daily    ondansetron (ZOFRAN) injection 4 mg  4 mg Intravenous Q6H PRN    pantoprazole (PROTONIX) injection 40 mg  40 mg Intravenous Q12H NACHO    polyethylene glycol (MIRALAX) packet 17 g  17 g Oral Daily    senna (SENOKOT) tablet 8.6 mg  1 tablet Oral Daily    sucralfate (CARAFATE) tablet 1 g  1 g Oral 4x Daily (AC & HS)    traZODone (DESYREL) tablet 200 mg  200 mg Oral HS       Allergies   Allergen Reactions    Losartan Cough    Latex Rash           Objective     Blood pressure 160/70, pulse 68, temperature 98.2 °F (36.8 °C), temperature source Oral, resp. rate 16, height 5' 3" (1.6 m), weight 72.1 kg (158 lb 14.4 oz), SpO2 94 %, not currently breastfeeding. Body mass index is 28.15 kg/m². Intake/Output Summary (Last 24 hours) at 10/16/2023 1232  Last data filed at 10/16/2023 1001  Gross per 24 hour   Intake 1405 ml   Output --   Net 1405 ml         PHYSICAL EXAM:      General Appearance:   Alert, cooperative, no distress   HEENT:   Normocephalic, atraumatic, anicteric. Neck:  Supple, symmetrical, trachea midline   Lungs:   Clear to auscultation bilaterally; no rales, rhonchi or wheezing; respirations unlabored    Heart[de-identified]   Regular rate and rhythm; no murmur, rub, or gallop.    Abdomen:   Soft, epigastric/RUQ tender, non-distended; normal bowel sounds; no masses, no organomegaly    Genitalia:   Deferred    Rectal:   Deferred    Extremities:  No cyanosis, clubbing or edema    Pulses:  2+ and symmetric all extremities    Skin:  No jaundice, rashes, or lesions    Lymph nodes:  No palpable cervical lymphadenopathy        Lab Results:   Admission on 10/15/2023   Component Date Value    WBC 10/15/2023 10.07     RBC 10/15/2023 4.60     Hemoglobin 10/15/2023 13.3     Hematocrit 10/15/2023 40.0     MCV 10/15/2023 87     MCH 10/15/2023 28.9     MCHC 10/15/2023 33.3     RDW 10/15/2023 12.6     MPV 10/15/2023 9.6     Platelets 97/25/2507 377     nRBC 10/15/2023 0     Neutrophils Relative 10/15/2023 61     Immat GRANS % 10/15/2023 0     Lymphocytes Relative 10/15/2023 31     Monocytes Relative 10/15/2023 7     Eosinophils Relative 10/15/2023 1     Basophils Relative 10/15/2023 0     Neutrophils Absolute 10/15/2023 6.10     Immature Grans Absolute 10/15/2023 0.03     Lymphocytes Absolute 10/15/2023 3.10     Monocytes Absolute 10/15/2023 0.71     Eosinophils Absolute 10/15/2023 0.10     Basophils Absolute 10/15/2023 0.03     Sodium 10/15/2023 138     Potassium 10/15/2023 4.0     Chloride 10/15/2023 101     CO2 10/15/2023 28     ANION GAP 10/15/2023 9     BUN 10/15/2023 12     Creatinine 10/15/2023 0.69     Glucose 10/15/2023 111     Calcium 10/15/2023 9.8     AST 10/15/2023 18     ALT 10/15/2023 14     Alkaline Phosphatase 10/15/2023 77     Total Protein 10/15/2023 7.9     Albumin 10/15/2023 4.6     Total Bilirubin 10/15/2023 0.45     eGFR 10/15/2023 97     Lipase 10/15/2023 13     hs TnI 0hr 10/15/2023 3     Ventricular Rate 10/15/2023 67     Atrial Rate 10/15/2023 67     IL Interval 10/15/2023 214     QRSD Interval 10/15/2023 66     QT Interval 10/15/2023 416     QTC Interval 10/15/2023 439     P Axis 10/15/2023 49     QRS Axis 10/15/2023 5     T Wave Hawi 10/15/2023 32     POC Glucose 10/15/2023 78     POC Glucose 10/15/2023 83     Magnesium 10/16/2023 2.3     Sodium 10/16/2023 137     Potassium 10/16/2023 4.3     Chloride 10/16/2023 104     CO2 10/16/2023 27     ANION GAP 10/16/2023 6     BUN 10/16/2023 11     Creatinine 10/16/2023 0.59 (L)     Glucose 10/16/2023 94     Glucose, Fasting 10/16/2023 94     Calcium 10/16/2023 9.1     AST 10/16/2023 76 (H)     ALT 10/16/2023 86 (H)     Alkaline Phosphatase 10/16/2023 121 (H)     Total Protein 10/16/2023 6.5     Albumin 10/16/2023 3.8     Total Bilirubin 10/16/2023 0.41     eGFR 10/16/2023 102     Lipase 10/16/2023 7 (L)     POC Glucose 10/16/2023 93     POC Glucose 10/16/2023 103        Imaging Studies: I have personally reviewed pertinent imaging studies.

## 2023-10-16 NOTE — ED PROVIDER NOTES
History  Chief Complaint   Patient presents with    Abdominal Pain     Pt states she has gastroparesis, gets botox injections for this she reports. Last botox injection was Tuesday. Pt felt good after x3 days, started with abdominal pain and nausea yesterday. Pt c/o abdominal pain / "burning" and nausea. Denies CP or SOB. This is a 82-year-old female with past medical history significant for longstanding diabetic gastroparesis presenting to the emergency department today for epigastric pain associated with acid reflux. The patient had a recent EGD with Botox injection approximately 1 week ago with Dr. Nick Harris. The patient notes she was feeling better for approximately 3 days. However, the patient notes for the past 3 days she has had worsening epigastric pain associated with acid reflux. She notes a burning midsternal chest pain that is nonradiating that has been ongoing for approximately 3 days. This does not radiate to her back. She notes nausea without vomiting. She has nonbloody diarrhea. She denies any constipation. She has no dysuria or hematuria. She has been taking her outpatient diabetic gastroparesis medications and notes no relief from this. She denies any fevers or chills. She notes abdominal bloating but this is typical for her gastroparesis. The patient denies other complaints at this time. History provided by:  Patient   used: No    Abdominal Pain  Pain location:  Epigastric  Pain quality: burning and fullness    Pain radiates to:  Chest  Pain severity:  Severe  Onset quality:  Gradual  Duration:  3 days  Timing:  Constant  Progression:  Worsening  Chronicity:  New  Relieved by:  Nothing  Worsened by:  Nothing  Ineffective treatments: home gastroparesis and gastritis medications.   Associated symptoms: anorexia, chest pain, diarrhea, fatigue and nausea    Associated symptoms: no belching, no chills, no constipation, no cough, no dysuria, no fever, no flatus, no hematuria, no melena, no shortness of breath, no sore throat and no vomiting        Prior to Admission Medications   Prescriptions Last Dose Informant Patient Reported? Taking? ARIPiprazole (ABILIFY) 10 mg tablet 10/14/2023 Self No Yes   Sig: Take 1 tablet (10 mg total) by mouth daily   Alcohol Swabs 70 % PADS  Self No Yes   Sig: May substitute brand based on insurance coverage. Check glucose TID. Blood Glucose Monitoring Suppl (OneTouch Verio Reflect) w/Device KIT 10/15/2023 Self No Yes   Sig: May substitute brand based on insurance coverage. Check glucose TID. Microlet Lancets MISC  Self Yes Yes   OneTouch Delica Lancets 94W MISC  Self No Yes   Sig: May substitute brand based on insurance coverage. Check glucose TID. SUMAtriptan (IMITREX) 50 mg tablet Past Month Self No Yes   Sig: Take 1 tablet (50 mg total) by mouth once as needed for migraine for up to 1 dose may repeat in 2 hours if necessary   aluminum-magnesium hydroxide-simethicone (MAALOX) 4770-9800-280 mg/30 mL suspension  Self No Yes   Sig: Take 30 mL by mouth every 4 (four) hours as needed for indigestion or heartburn (gas)   aspirin 81 mg chewable tablet  Self No Yes   Sig: Chew 1 tablet (81 mg total) daily Do not start before September 7, 2023.    atorvastatin (LIPITOR) 20 mg tablet 10/15/2023 Self No Yes   Sig: Take 1 tablet (20 mg total) by mouth daily   clonazePAM (KlonoPIN) 0.5 mg tablet 10/15/2023  No Yes   Sig: Take 1 tablet (0.5 mg total) by mouth daily as needed for anxiety   docusate sodium (COLACE) 100 mg capsule  Self No No   Sig: Take 1 capsule (100 mg total) by mouth 2 (two) times a day for 14 days   docusate sodium (COLACE) 100 mg capsule 10/14/2023 Self No Yes   Sig: Take 1 capsule (100 mg total) by mouth 2 (two) times a day   ergocalciferol (VITAMIN D2) 50,000 units 10/15/2023  No Yes   Sig: TAKE 1 CAPSULE BY MOUTH 1 TIME A WEEK   escitalopram (LEXAPRO) 10 mg tablet 10/15/2023 Self No Yes   Sig: TAKE 1 TABLET(10 MG) BY MOUTH DAILY   gabapentin (NEURONTIN) 800 mg tablet 10/15/2023  No Yes   Sig: TAKE 1 TABLET(800 MG) BY MOUTH THREE TIMES DAILY   hydrochlorothiazide (HYDRODIURIL) 12.5 mg tablet 10/15/2023  No Yes   Sig: TAKE 1 TABLET(12.5 MG) BY MOUTH DAILY   insulin glargine (LANTUS) 100 units/mL subcutaneous injection 10/15/2023 Self No Yes   Sig: Inject 25 Units under the skin daily at bedtime   levothyroxine 25 mcg tablet 10/15/2023 Self No Yes   Sig: TAKE 1 TABLET(25 MCG) BY MOUTH DAILY IN THE EARLY MORNING   metFORMIN (GLUCOPHAGE) 1000 MG tablet 10/15/2023  No Yes   Sig: TAKE 1 TABLET(1000 MG) BY MOUTH TWICE DAILY WITH MEALS   metoclopramide (Reglan) 10 mg tablet   No Yes   Sig: Take 1 tablet (10 mg total) by mouth 2 (two) times a day   metoprolol tartrate (LOPRESSOR) 50 mg tablet 10/15/2023 Self No Yes   Sig: TAKE 1 TABLET(50 MG) BY MOUTH EVERY 12 HOURS   ondansetron (ZOFRAN) 4 mg tablet  Self No Yes   Sig: Take 1 tablet (4 mg total) by mouth every 8 (eight) hours as needed for nausea or vomiting   pantoprazole (PROTONIX) 40 mg tablet 10/14/2023  No Yes   Sig: Take 1 tablet (40 mg total) by mouth 2 (two) times a day   polyethylene glycol (MIRALAX) 17 g packet Not Taking Self No No   Sig: Take 17 g by mouth daily   Patient not taking: Reported on 9/18/2023   sucralfate (CARAFATE) 1 g tablet 10/14/2023  No Yes   Sig: Take 1 tablet (1 g total) by mouth 4 (four) times a day   traZODone (DESYREL) 100 mg tablet 10/14/2023 Self No Yes   Sig: Take 2 tablets (200 mg total) by mouth daily at bedtime      Facility-Administered Medications: None       Past Medical History:   Diagnosis Date    Addiction to drug ([address])     Adjustment disorder     Alcohol abuse     Alcoholism ([address])     Anxiety     Bipolar disorder ([address])     Bowel obstruction ([address])     Depression     Diabetes type 2, controlled ([address])     Disease of thyroid gland     Gastritis     Head injury     Heart palpitations     Hyperlipidemia     Hypertension     Hypothyroidism Psychiatric illness     PTSD (post-traumatic stress disorder)     Seizure ([address])     Seizures ([address])     Sleep difficulties     Substance abuse ([address])     Suicide attempt ([address])     Withdrawal symptoms, drug or narcotic ([address])        Past Surgical History:   Procedure Laterality Date    ABDOMINAL SURGERY      APPENDECTOMY      BOWEL RESECTION      CHOLECYSTECTOMY      ESOPHAGOGASTRODUODENOSCOPY N/A 05/18/2018    Procedure: ESOPHAGOGASTRODUODENOSCOPY (EGD) with bx;  Surgeon: Mayela Martínez DO;  Location: [facility]; Service: Gastroenterology    HYSTERECTOMY      KNEE SURGERY Bilateral 1991       Family History   Problem Relation Age of Onset    Bipolar disorder Mother     Cancer Father     Diabetes Father      I have reviewed and agree with the history as documented. E-Cigarette/Vaping    E-Cigarette Use Never User      E-Cigarette/Vaping Substances    Nicotine No     THC No     CBD No     Flavoring No     Other No     Unknown No      Social History     Tobacco Use    Smoking status: Every Day     Packs/day: 0.50     Years: 25.00     Total pack years: 12.50     Types: Cigarettes     Passive exposure: Current    Smokeless tobacco: Never    Tobacco comments:     smokes like 5 a day(06/24/2022)   Vaping Use    Vaping Use: Never used   Substance Use Topics    Alcohol use: Not Currently     Alcohol/week: 6.0 standard drinks of alcohol     Types: 6 Cans of beer per week     Comment: last drink on 08/15/20    Drug use: Never       Review of Systems   Constitutional:  Positive for fatigue. Negative for chills and fever. HENT:  Negative for sore throat. Respiratory:  Negative for cough, chest tightness, shortness of breath and wheezing. Cardiovascular:  Positive for chest pain. Negative for palpitations and leg swelling. Gastrointestinal:  Positive for abdominal distention, abdominal pain, anorexia, diarrhea and nausea. Negative for blood in stool, constipation, flatus, melena and vomiting.    Genitourinary: Negative for dysuria, flank pain and hematuria. Musculoskeletal:  Negative for neck pain and neck stiffness. Skin:  Negative for rash and wound. Neurological:  Negative for dizziness, seizures, syncope, weakness, light-headedness, numbness and headaches. Psychiatric/Behavioral:  Negative for confusion. All other systems reviewed and are negative. Physical Exam  Physical Exam  Vitals and nursing note reviewed. Constitutional:       General: She is not in acute distress. Appearance: Normal appearance. She is normal weight. She is not ill-appearing, toxic-appearing or diaphoretic. HENT:      Head: Normocephalic and atraumatic. Nose: Nose normal. No congestion or rhinorrhea. Mouth/Throat:      Mouth: Mucous membranes are moist.      Pharynx: No oropharyngeal exudate or posterior oropharyngeal erythema. Eyes:      General: No scleral icterus. Right eye: No discharge. Left eye: No discharge. Extraocular Movements: Extraocular movements intact. Pupils: Pupils are equal, round, and reactive to light. Cardiovascular:      Rate and Rhythm: Normal rate and regular rhythm. Pulses: Normal pulses. Heart sounds: Normal heart sounds. No murmur heard. No friction rub. No gallop. Pulmonary:      Effort: Pulmonary effort is normal. No respiratory distress. Breath sounds: Normal breath sounds. No stridor. No wheezing, rhonchi or rales. Chest:      Chest wall: No tenderness. Abdominal:      General: Abdomen is flat. There is distension. Palpations: Abdomen is soft. Tenderness: There is abdominal tenderness. There is no right CVA tenderness, left CVA tenderness, guarding or rebound.       Comments: Mild abdominal bloating noted on abdominal examination  The patient has exquisite tenderness to her left upper quadrant and left lower quadrant; she does not tolerate testing for Rizvi sign  The patient is nonperitoneal   Musculoskeletal: General: Normal range of motion. Cervical back: Normal range of motion. No tenderness. Right lower leg: No edema. Left lower leg: No edema. Skin:     General: Skin is warm and dry. Capillary Refill: Capillary refill takes less than 2 seconds. Coloration: Skin is not jaundiced or pale. Neurological:      General: No focal deficit present. Mental Status: She is alert and oriented to person, place, and time. Mental status is at baseline.    Psychiatric:         Mood and Affect: Mood normal.         Behavior: Behavior normal.         Vital Signs  ED Triage Vitals   Temperature Pulse Respirations Blood Pressure SpO2   10/15/23 1245 10/15/23 1148 10/15/23 1148 10/15/23 1148 10/15/23 1148   97.8 °F (36.6 °C) 68 16 (!) 178/86 96 %      Temp Source Heart Rate Source Patient Position - Orthostatic VS BP Location FiO2 (%)   10/15/23 1245 10/15/23 1148 10/15/23 1148 10/15/23 1148 --   Oral Monitor Sitting Left arm       Pain Score       10/15/23 1148       9           Vitals:    10/15/23 1148 10/15/23 1330 10/15/23 1530 10/15/23 1656   BP: (!) 178/86 136/78 167/85 130/80   Pulse: 68 80 81 64   Patient Position - Orthostatic VS: Sitting Standing Standing Sitting         Visual Acuity      ED Medications  Medications   lactated ringers infusion (75 mL/hr Intravenous New Bag 10/15/23 1714)   docusate sodium (COLACE) capsule 100 mg (100 mg Oral Given 10/15/23 1721)   senna (SENOKOT) tablet 8.6 mg (8.6 mg Oral Given 10/15/23 1712)   polyethylene glycol (MIRALAX) packet 17 g (17 g Oral Not Given 10/15/23 1713)   ondansetron (ZOFRAN) injection 4 mg (has no administration in time range)   nicotine (NICODERM CQ) 14 mg/24hr TD 24 hr patch 1 patch (1 patch Transdermal Medication Applied 10/15/23 1714)   enoxaparin (LOVENOX) subcutaneous injection 40 mg (40 mg Subcutaneous Given 10/15/23 1713)   aluminum-magnesium hydroxide-simethicone (MAALOX) oral suspension 30 mL (has no administration in time range) dicyclomine (BENTYL) capsule 20 mg (20 mg Oral Given 10/15/23 1712)   morphine injection 2 mg (2 mg Intravenous Given 10/15/23 1714)   ARIPiprazole (ABILIFY) tablet 10 mg (10 mg Oral Given 10/15/23 1712)   aspirin chewable tablet 81 mg (81 mg Oral Given 10/15/23 1712)   atorvastatin (LIPITOR) tablet 20 mg (20 mg Oral Given 10/15/23 1712)   clonazePAM (KlonoPIN) tablet 0.5 mg (has no administration in time range)   escitalopram (LEXAPRO) tablet 10 mg (10 mg Oral Given 10/15/23 1713)   gabapentin (NEURONTIN) capsule 400 mg (400 mg Oral Given 10/15/23 1712)   insulin glargine (LANTUS) subcutaneous injection 25 Units 0.25 mL (has no administration in time range)   levothyroxine tablet 25 mcg (has no administration in time range)   metoprolol tartrate (LOPRESSOR) tablet 50 mg (has no administration in time range)   metoclopramide (REGLAN) tablet 10 mg (10 mg Oral Given 10/15/23 1721)   sucralfate (CARAFATE) tablet 1 g (1 g Oral Given 10/15/23 1712)   traZODone (DESYREL) tablet 200 mg (has no administration in time range)   insulin lispro (HumaLOG) 100 units/mL subcutaneous injection 1-5 Units ( Subcutaneous Not Given 10/15/23 1658)   insulin lispro (HumaLOG) 100 units/mL subcutaneous injection 1-5 Units (has no administration in time range)   influenza vaccine, quadrivalent (FLUARIX) IM injection 0.5 mL (has no administration in time range)   sodium chloride 0.9 % bolus 1,000 mL (1,000 mL Intravenous New Bag 10/15/23 1244)   metoclopramide (REGLAN) injection 10 mg (10 mg Intravenous Given 10/15/23 1245)   diphenhydrAMINE (BENADRYL) injection 12.5 mg (12.5 mg Intravenous Given 10/15/23 1246)   morphine injection 2 mg (2 mg Intravenous Given 10/15/23 1247)   simethicone (MYLICON) chewable tablet 160 mg (160 mg Oral Given 10/15/23 1250)   Famotidine (PF) (PEPCID) injection 20 mg (20 mg Intravenous Given 10/15/23 1249)   aluminum-magnesium hydroxide-simethicone (MAALOX) oral suspension 15 mL (15 mL Oral Given 10/15/23 1328)   iohexol (OMNIPAQUE) 350 MG/ML injection (SINGLE-DOSE) 100 mL (100 mL Intravenous Given 10/15/23 1359)   pantoprazole (PROTONIX) injection 40 mg (40 mg Intravenous Given 10/15/23 1422)   morphine injection 2 mg (2 mg Intravenous Given 10/15/23 1425)   diphenhydramine, lidocaine, Al/Mg hydroxide, simethicone (Magic Mouthwash) oral solution 10 mL (10 mL Swish & Swallow Given 10/15/23 1438)       Diagnostic Studies  Results Reviewed       Procedure Component Value Units Date/Time    HS Troponin 0hr (reflex protocol) [[id number]]  (Normal) Collected: 10/15/23 1251    Lab Status: Final result Specimen: Blood from Arm, Left Updated: 10/15/23 1329     hs TnI 0hr 3 ng/L     Comprehensive metabolic panel [[id number]] Collected: 10/15/23 1251    Lab Status: Final result Specimen: Blood from Arm, Left Updated: 10/15/23 1324     Sodium 138 mmol/L      Potassium 4.0 mmol/L      Chloride 101 mmol/L      CO2 28 mmol/L      ANION GAP 9 mmol/L      BUN 12 mg/dL      Creatinine 0.69 mg/dL      Glucose 111 mg/dL      Calcium 9.8 mg/dL      AST 18 U/L      ALT 14 U/L      Alkaline Phosphatase 77 U/L      Total Protein 7.9 g/dL      Albumin 4.6 g/dL      Total Bilirubin 0.45 mg/dL      eGFR 97 ml/min/1.73sq m     Narrative:      [facility] for Chronic Kidney Disease (CKD):     Stage 1 with normal or high GFR (GFR > 90 mL/min/1.73 square meters)    Stage 2 Mild CKD (GFR = 60-89 mL/min/1.73 square meters)    Stage 3A Moderate CKD (GFR = 45-59 mL/min/1.73 square meters)    Stage 3B Moderate CKD (GFR = 30-44 mL/min/1.73 square meters)    Stage 4 Severe CKD (GFR = 15-29 mL/min/1.73 square meters)    Stage 5 End Stage CKD (GFR <15 mL/min/1.73 square meters)  Note: GFR calculation is accurate only with a steady state creatinine    Lipase [[id number]]  (Normal) Collected: 10/15/23 1251    Lab Status: Final result Specimen: Blood from Arm, Left Updated: 10/15/23 1324     Lipase 13 u/L     CBC and differential [[id number]] Collected: 10/15/23 1251    Lab Status: Final result Specimen: Blood from Arm, Left Updated: 10/15/23 1304     WBC 10.07 Thousand/uL      RBC 4.60 Million/uL      Hemoglobin 13.3 g/dL      Hematocrit 40.0 %      MCV 87 fL      MCH 28.9 pg      MCHC 33.3 g/dL      RDW 12.6 %      MPV 9.6 fL      Platelets 941 Thousands/uL      nRBC 0 /100 WBCs      Neutrophils Relative 61 %      Immat GRANS % 0 %      Lymphocytes Relative 31 %      Monocytes Relative 7 %      Eosinophils Relative 1 %      Basophils Relative 0 %      Neutrophils Absolute 6.10 Thousands/µL      Immature Grans Absolute 0.03 Thousand/uL      Lymphocytes Absolute 3.10 Thousands/µL      Monocytes Absolute 0.71 Thousand/µL      Eosinophils Absolute 0.10 Thousand/µL      Basophils Absolute 0.03 Thousands/µL                    CT abdomen pelvis with contrast   Final Result by Marilynn Magana MD (10/15 8739)      No acute abdominopelvic pathology. Workstation performed: [id number]         XR chest 1 view portable    (Results Pending)              Procedures  ECG 12 Lead Documentation Only    Date/Time: 10/15/2023 12:57 PM    Performed by: Lois Boyce PA-C  Authorized by: Lois Boyce PA-C    Indications / Diagnosis:  Chest Burning Sensation  Patient location:  ED  Previous ECG:     Previous ECG:  Compared to current    Comparison ECG info:  9/3/2023    Similarity:  No change  Interpretation:     Interpretation: normal    Rate:     ECG rate:  67    ECG rate assessment: normal    Rhythm:     Rhythm: sinus rhythm    Ectopy:     Ectopy: none    QRS:     QRS axis:  Normal  Conduction:     Conduction: normal    ST segments:     ST segments:  Normal  T waves:     T waves: normal    Comments:      Normal sinus rhythm with a rate of 67. Normal axis. No ectopy. No STEMI.            ED Course                                             Medical Decision Making  65-year-old female presenting to the emergency department today for epigastric pain associated with acid reflux symptoms. Symptoms have been worsening for 3 days. Recent EGD with Botox injection secondary to longstanding diabetic gastroparesis. She follows up outpatient with gastroenterologist Dr. Yvonne Kaufman. Vital signs are stable. On physical examination, the patient has exquisite tenderness to palpation to her epigastrium. She does not tolerate testing for Rizvi sign. Labs are reassuring. CT abdomen and pelvis shows no acute intra-abdominal pathology. The patient was dosed with Protonix, Pepcid, Maalox, Magic mouthwash, morphine x2, and intravenous fluids without relief of symptoms. Chest x-ray shows no acute cardiopulmonary disease on my independent interpretation. Based upon refractory pain, will plan for admission. Case was discussed with Dr. Franco Gary who accepts the patient for admission. The patient and/or patient's proxy verify their understanding and agree to the plan at this time. All questions answered to the patient and/or their proxy's satisfaction. All labs reviewed and utilized in the medical decision making process (if labs were ordered). Portions of the record may have been created with voice recognition software. Occasional wrong word or "sound a like" substitutions may have occurred due to the inherent limitations of voice recognition software. Read the chart carefully and recognize, using context, where substitutions have occurred. I reviewed prior notes and lab findings. Amount and/or Complexity of Data Reviewed  Independent Historian: spouse     Details: Significant other at bedside  External Data Reviewed: labs and notes. Labs: ordered. Decision-making details documented in ED Course. Radiology: ordered and independent interpretation performed. Decision-making details documented in ED Course. Details: CXR  ECG/medicine tests: ordered and independent interpretation performed.  Decision-making details documented in ED Course. Discussion of management or test interpretation with external provider(s): Dr. Ever Coley - [facility]    Risk  OTC drugs. Prescription drug management. Decision regarding hospitalization. Disposition  Final diagnoses:   Epigastric pain   Diabetic gastroparesis      Time reflects when diagnosis was documented in both MDM as applicable and the Disposition within this note       Time User Action Codes Description Comment    10/15/2023  3:28 PM Sandra Ocampo Flattery Add [R10.13] Epigastric pain     10/15/2023  3:28 PM Chely Ocampo Pale [Z27.75,  K31.84] Diabetic gastroparesis            ED Disposition       ED Disposition   Admit    Condition   Stable    Date/Time   Sun Oct 15, 2023  3:28 PM    Comment   Case was discussed with Dr. Ever Coley and the patient's admission status was agreed to be Admission Status: observation status to the service of Dr. Ever Coley. Follow-up Information    None         Current Discharge Medication List        CONTINUE these medications which have NOT CHANGED    Details   Alcohol Swabs 70 % PADS May substitute brand based on insurance coverage. Check glucose TID. Qty: 100 each, Refills: 0    Associated Diagnoses: Diabetes mellitus type 2 in obese       aluminum-magnesium hydroxide-simethicone (MAALOX) 6012-7202-932 mg/30 mL suspension Take 30 mL by mouth every 4 (four) hours as needed for indigestion or heartburn (gas)  Qty: 355 mL, Refills: 0    Associated Diagnoses: Abdominal pain      ARIPiprazole (ABILIFY) 10 mg tablet Take 1 tablet (10 mg total) by mouth daily  Qty: 30 tablet, Refills: 0    Comments: **Patient requests 90 days supply**  Associated Diagnoses: Bipolar disorder, current episode depressed, severe, without psychotic features (HCC)      aspirin 81 mg chewable tablet Chew 1 tablet (81 mg total) daily Do not start before September 7, 2023.   Qty: 30 tablet, Refills: 0    Associated Diagnoses: Dysarthria atorvastatin (LIPITOR) 20 mg tablet Take 1 tablet (20 mg total) by mouth daily  Qty: 90 tablet, Refills: 1    Associated Diagnoses: Sinus tachycardia      Blood Glucose Monitoring Suppl (OneTouch Verio Reflect) w/Device KIT May substitute brand based on insurance coverage. Check glucose TID.   Qty: 1 kit, Refills: 0    Associated Diagnoses: Diabetes mellitus type 2 in obese       clonazePAM (KlonoPIN) 0.5 mg tablet Take 1 tablet (0.5 mg total) by mouth daily as needed for anxiety  Qty: 30 tablet, Refills: 0    Associated Diagnoses: Bipolar disorder, current episode depressed, severe, without psychotic features (HCC)      docusate sodium (COLACE) 100 mg capsule Take 1 capsule (100 mg total) by mouth 2 (two) times a day  Qty: 60 capsule, Refills: 0    Associated Diagnoses: Abdominal pain      ergocalciferol (VITAMIN D2) 50,000 units TAKE 1 CAPSULE BY MOUTH 1 TIME A WEEK  Qty: 12 capsule, Refills: 1    Associated Diagnoses: Vitamin D deficiency      escitalopram (LEXAPRO) 10 mg tablet TAKE 1 TABLET(10 MG) BY MOUTH DAILY  Qty: 90 tablet, Refills: 1    Comments: **Patient requests 90 days supply**  Associated Diagnoses: Anxiety      gabapentin (NEURONTIN) 800 mg tablet TAKE 1 TABLET(800 MG) BY MOUTH THREE TIMES DAILY  Qty: 90 tablet, Refills: 1    Associated Diagnoses: Bipolar 1 disorder, depressed (HCC)      hydrochlorothiazide (HYDRODIURIL) 12.5 mg tablet TAKE 1 TABLET(12.5 MG) BY MOUTH DAILY  Qty: 90 tablet, Refills: 1    Associated Diagnoses: Primary hypertension      insulin glargine (LANTUS) 100 units/mL subcutaneous injection Inject 25 Units under the skin daily at bedtime  Qty: 10 mL, Refills: 0    Associated Diagnoses: Diabetes mellitus type 2 in obese       levothyroxine 25 mcg tablet TAKE 1 TABLET(25 MCG) BY MOUTH DAILY IN THE EARLY MORNING  Qty: 90 tablet, Refills: 3    Associated Diagnoses: Hypothyroidism      metFORMIN (GLUCOPHAGE) 1000 MG tablet TAKE 1 TABLET(1000 MG) BY MOUTH TWICE DAILY WITH MEALS  Qty: 180 tablet, Refills: 1    Associated Diagnoses: Bipolar disorder, current episode depressed, severe, without psychotic features (HCC)      metoclopramide (Reglan) 10 mg tablet Take 1 tablet (10 mg total) by mouth 2 (two) times a day  Qty: 90 tablet, Refills: 0    Associated Diagnoses: Diabetic gastroparesis associated with type 2 diabetes mellitus       metoprolol tartrate (LOPRESSOR) 50 mg tablet TAKE 1 TABLET(50 MG) BY MOUTH EVERY 12 HOURS  Qty: 60 tablet, Refills: 5    Associated Diagnoses: Primary hypertension      !! Microlet Lancets MISC       ondansetron (ZOFRAN) 4 mg tablet Take 1 tablet (4 mg total) by mouth every 8 (eight) hours as needed for nausea or vomiting  Qty: 20 tablet, Refills: 0    Associated Diagnoses: Nausea and vomiting, unspecified vomiting type      !! OneTouch Delica Lancets 33O MISC May substitute brand based on insurance coverage. Check glucose TID.   Qty: 100 each, Refills: 0    Associated Diagnoses: Diabetes mellitus type 2 in obese       pantoprazole (PROTONIX) 40 mg tablet Take 1 tablet (40 mg total) by mouth 2 (two) times a day  Qty: 180 tablet, Refills: 0    Comments: **Patient requests 90 days supply**  Associated Diagnoses: Bronchitis      sucralfate (CARAFATE) 1 g tablet Take 1 tablet (1 g total) by mouth 4 (four) times a day  Qty: 120 tablet, Refills: 1    Associated Diagnoses: Gastritis without bleeding, unspecified chronicity, unspecified gastritis type      SUMAtriptan (IMITREX) 50 mg tablet Take 1 tablet (50 mg total) by mouth once as needed for migraine for up to 1 dose may repeat in 2 hours if necessary  Qty: 10 tablet, Refills: 0    Associated Diagnoses: Migraine without aura and without status migrainosus, not intractable      traZODone (DESYREL) 100 mg tablet Take 2 tablets (200 mg total) by mouth daily at bedtime  Qty: 60 tablet, Refills: 2    Associated Diagnoses: PTSD (post-traumatic stress disorder)      polyethylene glycol (MIRALAX) 17 g packet Take 17 g by mouth daily  Qty: 30 each, Refills: 0    Associated Diagnoses: Abdominal pain       !! - Potential duplicate medications found. Please discuss with provider. No discharge procedures on file.     PDMP Review         Value Time User    PDMP Reviewed  Yes 7/12/2023  4:04 PM Misty Bassett, [address] [place] [address]            ED Provider  Electronically Signed by             Daya Cormier PA-C  10/15/23 2059

## 2023-10-16 NOTE — ASSESSMENT & PLAN NOTE
Lab Results   Component Value Date    HGBA1C 5.5 09/03/2023       Recent Labs     10/15/23  1653 10/15/23  2129 10/16/23  0717 10/16/23  1059   POCGLU 78 83 93 103       Blood Sugar Average: Last 72 hrs:  (P) 89.25    Home Regimen:  Lantus 25 units qHS and Metformin  Hold Oral medications while admitted  C/w Lantus   Continue Accu-Checks and SSI AC/HS  Hypoglycemic protocol  Diabetic Diet

## 2023-10-16 NOTE — UTILIZATION REVIEW
Initial Clinical Review    OBS 10/15 UPGRADED TO INPATIENT 10/16 FOR CONTINUED TX OF ABDOMINAL PAIN    Admission: Date/Time/Statement:   Admission Orders (From admission, onward)       Ordered        10/16/23 1549  Inpatient Admission  Once            10/15/23 1528  Place in Observation  Once                          Orders Placed This Encounter   Procedures    Inpatient Admission     Standing Status:   Standing     Number of Occurrences:   1     Order Specific Question:   Level of Care     Answer:   Med Surg [16]     Order Specific Question:   Estimated length of stay     Answer:   More than 2 Midnights     Order Specific Question:   Certification     Answer:   I certify that inpatient services are medically necessary for this patient for a duration of greater than two midnights. See H&P and MD Progress Notes for additional information about the patient's course of treatment. ED Arrival Information       Expected   -    Arrival   10/15/2023 11:39    Acuity   Urgent              Means of arrival   Walk-In    Escorted by   Family Member    Service   Hospitalist    Admission type   Emergency              Arrival complaint   Abdominal Pain             Chief Complaint   Patient presents with    Abdominal Pain     Pt states she has gastroparesis, gets botox injections for this she reports. Last botox injection was Tuesday. Pt felt good after x3 days, started with abdominal pain and nausea yesterday. Pt c/o abdominal pain / "burning" and nausea. Denies CP or SOB. Initial Presentation: 66 y.o. female with PMHx of gastroparesis, generalized anxiety d/o, depression, bipolar d/o, T2 DM, HTN, hypothyroidism presents to ED with c/o RUQ abdominal pain for 2-3 days which is severe in intensity of 10/10. He has been having worsening epigastric pain with acid reflux. Pt follows with GI as op. Pt had a EGD and Botox performed into the pyloric sphincter during his last admission.   Pt denies n/v, but also c/o decreased appetite. CT a/p with no acute abl. EKG NSR with no acute ST-T changes noted. Admitted under observation to MS unit with Abdominal pain  likely 2/2 Gastroparesis -- avoid opiates. Stool softeners. Consult GI. Pt had EGD and Botox inj on 9/18. Antiemetics prn. Continue pta po meds. Date: 10/16  Day 2:   GI consult -- Upper abdominal pain likely multifactorial 2/2 diabetic gastroparesis, GERD, and possible bile reflux. Discussed with pt that it could take weeks for Botox to take effect after intra pyloric Botox injection. She was previously on Ozempic which was stopped. LFTs mildly elevated this AM. She denies any vomiting but continues with intermittent upper abdominal pain. Continue clear liquid diet and advance to low-fat/low residue as tolerated. Obtain RUQ US. Continue Reglan BID, PPI BID. Increase Carafate to 4x/day. Avoid NSAIDs. Avoid meds which slow GI motility such as narcotics, anticholinergics, etc. Continue supportive care with IVF, repletion of electrolytes as tolerated. Monitor CBC, CMP. Bowel regimen with Miralax. Insulin, accuchecks w/ ssi.       ED Triage Vitals   Temperature Pulse Respirations Blood Pressure SpO2   10/15/23 1245 10/15/23 1148 10/15/23 1148 10/15/23 1148 10/15/23 1148   97.8 °F (36.6 °C) 68 16 (!) 178/86 96 %      Temp Source Heart Rate Source Patient Position - Orthostatic VS BP Location FiO2 (%)   10/15/23 1245 10/15/23 1148 10/15/23 1148 10/15/23 1148 --   Oral Monitor Sitting Left arm       Pain Score       10/15/23 1148       9          Wt Readings from Last 1 Encounters:   10/15/23 72.1 kg (158 lb 14.4 oz)     Additional Vital Signs:   Date/Time Temp Pulse Resp BP MAP (mmHg) SpO2 O2 Device Patient Position - Orthostatic VS   10/16/23 1636 98.3 °F (36.8 °C) 55 16 -- -- 97 % None (Room air) Lying   10/16/23 0806 98.2 °F (36.8 °C) 68 16 160/70 -- 94 % -- Lying   10/15/23 2339 98.2 °F (36.8 °C) 53 Abnormal  20 130/72 99 92 % None (Room air) Lying   10/15/23 2138 -- 70 -- 143/85 -- -- -- --   10/15/23 2100 -- -- -- -- -- -- None (Room air) --   10/15/23 1656 98.6 °F (37 °C) 64 20 130/80 -- 98 % None (Room air) Sitting   10/15/23 1530 -- 81 22 167/85 121 97 % None (Room air) Standing   10/15/23 1330 -- 80 22 136/78 102 95 % None (Room air) Standing   10/15/23 1245 97.8 °F (36.6 °C) -- -- -- -- -- -- --   10/15/23 1148 -- 68 16 178/86 Abnormal  -- 96 % None (Room air) Sitting       Pertinent Labs/Diagnostic Test Results:   CT abdomen pelvis with contrast   Final Result by Saman Knight MD (10/15 1409)      No acute abdominopelvic pathology. XR chest 1 view portable   Final Result by Alexis Palomares MD (57/82 2272)      Minimal left basilar atelectasis suggested.          US right upper quadrant    (Results Pending)         Results from last 7 days   Lab Units 10/15/23  1251   WBC Thousand/uL 10.07   HEMOGLOBIN g/dL 13.3   HEMATOCRIT % 40.0   PLATELETS Thousands/uL 377   NEUTROS ABS Thousands/µL 6.10     Results from last 7 days   Lab Units 10/16/23  0539 10/15/23  1251   SODIUM mmol/L 137 138   POTASSIUM mmol/L 4.3 4.0   CHLORIDE mmol/L 104 101   CO2 mmol/L 27 28   ANION GAP mmol/L 6 9   BUN mg/dL 11 12   CREATININE mg/dL 0.59* 0.69   EGFR ml/min/1.73sq m 102 97   CALCIUM mg/dL 9.1 9.8   MAGNESIUM mg/dL 2.3  --      Results from last 7 days   Lab Units 10/16/23  0539 10/15/23  1251   AST U/L 76* 18   ALT U/L 86* 14   ALK PHOS U/L 121* 77   TOTAL PROTEIN g/dL 6.5 7.9   ALBUMIN g/dL 3.8 4.6   TOTAL BILIRUBIN mg/dL 0.41 0.45     Results from last 7 days   Lab Units 10/16/23  1644 10/16/23  1059 10/16/23  0717 10/15/23  2129 10/15/23  1653 10/10/23  0815   POC GLUCOSE mg/dl 97 103 93 83 78 138     Results from last 7 days   Lab Units 10/16/23  0539 10/15/23  1251   GLUCOSE RANDOM mg/dL 94 111     Results from last 7 days   Lab Units 10/15/23  1251   HS TNI 0HR ng/L 3     Results from last 7 days   Lab Units 10/16/23  0539 10/15/23  1251   LIPASE u/L 7* 13     ED Treatment: Medication Administration from 10/15/2023 1139 to 10/15/2023 1625         Date/Time Order Dose Route Action     10/15/2023 1244 EDT sodium chloride 0.9 % bolus 1,000 mL 1,000 mL Intravenous New Bag     10/15/2023 1245 EDT metoclopramide (REGLAN) injection 10 mg 10 mg Intravenous Given     10/15/2023 1246 EDT diphenhydrAMINE (BENADRYL) injection 12.5 mg 12.5 mg Intravenous Given     10/15/2023 1247 EDT morphine injection 2 mg 2 mg Intravenous Given     10/15/2023 1250 EDT simethicone (MYLICON) chewable tablet 160 mg 160 mg Oral Given     10/15/2023 1249 EDT Famotidine (PF) (PEPCID) injection 20 mg 20 mg Intravenous Given     10/15/2023 1328 EDT aluminum-magnesium hydroxide-simethicone (MAALOX) oral suspension 15 mL 15 mL Oral Given     10/15/2023 1422 EDT pantoprazole (PROTONIX) injection 40 mg 40 mg Intravenous Given     10/15/2023 1425 EDT morphine injection 2 mg 2 mg Intravenous Given     10/15/2023 1438 EDT diphenhydramine, lidocaine, Al/Mg hydroxide, simethicone (Magic Mouthwash) oral solution 10 mL 10 mL Swish & Swallow Given       Past Medical History:   Diagnosis Date    Addiction to drug ([address])     Adjustment disorder     Alcohol abuse     Alcoholism ([address])     Anxiety     Bipolar disorder ([address])     Bowel obstruction ([address])     Depression     Diabetes type 2, controlled ([address])     Disease of thyroid gland     Gastritis     Head injury     Heart palpitations     Hyperlipidemia     Hypertension     Hypothyroidism     Psychiatric illness     PTSD (post-traumatic stress disorder)     Seizure ([address])     Seizures ([address])     Sleep difficulties     Substance abuse ([address])     Suicide attempt ([address])     Withdrawal symptoms, drug or narcotic ([address])      Present on Admission:   Abdominal pain   Acquired hypothyroidism   Bipolar 1 disorder, depressed ([address])   Diabetes mellitus type 2 in obese    Essential hypertension   Tobacco abuse      Admitting Diagnosis: Epigastric pain [R10.13]  Abdominal pain [R10.9]  Diabetic gastroparesis  [E11.43, K31.84]  Age/Sex: 66 y.o. female  Admission Orders:  Scheduled Medications:  ARIPiprazole, 10 mg, Oral, Daily  aspirin, 81 mg, Oral, Daily  atorvastatin, 20 mg, Oral, Daily  dicyclomine, 20 mg, Oral, TID  docusate sodium, 100 mg, Oral, BID  enoxaparin, 40 mg, Subcutaneous, Daily  escitalopram, 10 mg, Oral, Daily  gabapentin, 400 mg, Oral, TID  insulin glargine, 25 Units, Subcutaneous, HS  insulin lispro, 1-5 Units, Subcutaneous, TID AC  insulin lispro, 1-5 Units, Subcutaneous, HS  levothyroxine, 25 mcg, Oral, Early Morning  metoclopramide, 10 mg, Oral, BID  metoprolol tartrate, 50 mg, Oral, Q12H NACHO  nicotine, 1 patch, Transdermal, Daily  pantoprazole, 40 mg, Intravenous, Q12H NACHO  polyethylene glycol, 17 g, Oral, Daily  senna, 1 tablet, Oral, Daily  sucralfate, 1 g, Oral, 4x Daily (AC & HS)  traZODone, 200 mg, Oral, HS    Continuous IV Infusions:  lactated ringers, 75 mL/hr, Intravenous, Continuous    PRN Meds:  aluminum-magnesium hydroxide-simethicone, 30 mL, Oral, Q6H PRN  clonazePAM, 0.5 mg, Oral, Daily PRN  morphine injection, 2 mg, Intravenous, Q4H PRN  ondansetron, 4 mg, Intravenous, Q6H PRN          Network Utilization Review Department  ATTENTION: Please call with any questions or concerns to [phone] and carefully listen to the prompts so that you are directed to the right person. All voicemails are confidential.   For Discharge needs, contact Care Management DC Support Team at [phone] opt. 2  Send all requests for admission clinical reviews, approved or denied determinations and any other requests to dedicated fax number below belonging to the campus where the patient is receiving treatment.  List of dedicated fax numbers for the Facilities:  [place] DENIALS (Administrative/Medical Necessity) [phone]   DISCHARGE SUPPORT TEAM (NETWORK) [id number] [address] [facility] (Maternity/NICU/Pediatrics) [address] [place] [address] Suzie Hicks [phone]   [facility] [address] [facility] [phone] [address] [place] [address] [phone]   [address] [address] [phone]

## 2023-10-16 NOTE — PROGRESS NOTES
[address]  Progress Note  Name: Carolyn Deal  MRN: [id number]  Unit/Bed#: -01 I Date of Admission: 10/15/2023   Date of Service: 10/16/2023 I Hospital Day: 0    Assessment/Plan   * Abdominal pain  Assessment & Plan  Intractable right upper quadrant and epigastric abdominal pain with reflux-like symptoms   History of gastroparesis   labs essentially normal with lipase normal   CT of the abdomen and pelvis with contrast shows no acute abdominal pelvic pathology. Abdominal pain likely secondary to Gastroparesis  Slight Bump in LFT's  RUQ U/S: Ordered  CLD   Advance as tolerates  Avoid opiates, will give stool softeners  S/p Botox injection on 9/18/2023. GI Consulted  C/w PPI/Reglan  Increase Carafate to 4 times daily  Continue to monitor while admitted    Diabetes mellitus type 2 in obese   Assessment & Plan  Lab Results   Component Value Date    HGBA1C 5.5 09/03/2023       Recent Labs     10/15/23  1653 10/15/23  2129 10/16/23  0717 10/16/23  1059   POCGLU 78 83 93 103       Blood Sugar Average: Last 72 hrs:  (P) 89.25    Home Regimen:  Lantus 25 units qHS and Metformin  Hold Oral medications while admitted  C/w Lantus   Continue Accu-Checks and SSI AC/HS  Hypoglycemic protocol  Diabetic Diet      Tobacco abuse  Assessment & Plan  Educated on smoking cessation  NRT    Acquired hypothyroidism  Assessment & Plan  Continue levothyroxine    Bipolar 1 disorder, depressed (HCC)  Assessment & Plan  Continue Lexapro, aripiprazole and trazodone    Essential hypertension  Assessment & Plan  BP reviewed and fluctuating  Continue metoprolol               VTE Pharmacologic Prophylaxis:   Moderate Risk (Score 3-4) - Pharmacological DVT Prophylaxis Ordered: enoxaparin (Lovenox). Patient Centered Rounds: I performed bedside rounds with nursing staff today.   Discussions with Specialists or Other Care Team Provider: GI    Education and Discussions with Family / Patient: Patient declined call to . Total Time Spent on Date of Encounter in care of patient: 30 mins. This time was spent on one or more of the following: performing physical exam; counseling and coordination of care; obtaining or reviewing history; documenting in the medical record; reviewing/ordering tests, medications or procedures; communicating with other healthcare professionals and discussing with patient's family/caregivers. Current Length of Stay: 0 day(s)  Current Patient Status: Inpatient   Certification Statement: The patient will continue to require additional inpatient hospital stay due to abdominal pain, wiring slow advancement in diet, and elevated LFTs requiring right upper quadrant ultrasound evaluation  Discharge Plan: Anticipate discharge in 24-48 hrs to home. Code Status: Level 1 - Full Code    Subjective:   Patient complaining of persistent RUQ and mid epigastric abdominal pain. Patient denies any chest pain or shortness of breath. Objective:     Vitals:   Temp (24hrs), Av.3 °F (36.8 °C), Min:98.2 °F (36.8 °C), Max:98.6 °F (37 °C)    Temp:  [98.2 °F (36.8 °C)-98.6 °F (37 °C)] 98.2 °F (36.8 °C)  HR:  [53-70] 68  Resp:  [16-20] 16  BP: (130-160)/(70-85) 160/70  SpO2:  [92 %-98 %] 94 %  Body mass index is 28.15 kg/m². Input and Output Summary (last 24 hours): Intake/Output Summary (Last 24 hours) at 10/16/2023 1559  Last data filed at 10/16/2023 1001  Gross per 24 hour   Intake 1405 ml   Output --   Net 1405 ml       Physical Exam:   Physical Exam  Vitals and nursing note reviewed. Constitutional:       General: She is not in acute distress. HENT:      Head: Normocephalic. Nose: Nose normal. No congestion. Mouth/Throat:      Mouth: Mucous membranes are moist.   Cardiovascular:      Rate and Rhythm: Normal rate and regular rhythm. Pulses: Normal pulses. Heart sounds: Normal heart sounds. No murmur heard.   Pulmonary:      Effort: Pulmonary effort is normal. No respiratory distress. Abdominal:      General: Bowel sounds are normal. There is no distension. Palpations: Abdomen is soft. Tenderness: There is abdominal tenderness. Musculoskeletal:         General: Normal range of motion. Cervical back: Normal range of motion. Right lower leg: No edema. Left lower leg: No edema. Skin:     General: Skin is warm and dry. Capillary Refill: Capillary refill takes less than 2 seconds. Neurological:      Mental Status: She is alert and oriented to person, place, and time. Mental status is at baseline. Motor: No weakness. Psychiatric:         Mood and Affect: Mood is anxious. Speech: Speech normal.         Behavior: Behavior is cooperative.           Additional Data:     Labs:  Results from last 7 days   Lab Units 10/15/23  1251   WBC Thousand/uL 10.07   HEMOGLOBIN g/dL 13.3   HEMATOCRIT % 40.0   PLATELETS Thousands/uL 377   NEUTROS PCT % 61   LYMPHS PCT % 31   MONOS PCT % 7   EOS PCT % 1     Results from last 7 days   Lab Units 10/16/23  0539   SODIUM mmol/L 137   POTASSIUM mmol/L 4.3   CHLORIDE mmol/L 104   CO2 mmol/L 27   BUN mg/dL 11   CREATININE mg/dL 0.59*   ANION GAP mmol/L 6   CALCIUM mg/dL 9.1   ALBUMIN g/dL 3.8   TOTAL BILIRUBIN mg/dL 0.41   ALK PHOS U/L 121*   ALT U/L 86*   AST U/L 76*   GLUCOSE RANDOM mg/dL 94         Results from last 7 days   Lab Units 10/16/23  1059 10/16/23  0717 10/15/23  2129 10/15/23  1653 10/10/23  0815   POC GLUCOSE mg/dl 103 93 83 78 138               Lines/Drains:  Invasive Devices       Peripheral Intravenous Line  Duration             Peripheral IV 10/15/23 Left Antecubital 1 day                          Imaging: Reviewed radiology reports from this admission including: chest xray and abdominal/pelvic CT    Recent Cultures (last 7 days):         Last 24 Hours Medication List:   Current Facility-Administered Medications   Medication Dose Route Frequency Provider Last Rate    aluminum-magnesium hydroxide-simethicone  30 mL Oral Q6H PRN Ophelia Templeton MD      ARIPiprazole  10 mg Oral Daily Maris Krause MD      aspirin  81 mg Oral Daily Maris Krause MD      atorvastatin  20 mg Oral Daily Maris Krause MD      clonazePAM  0.5 mg Oral Daily PRN Ophelia Templeton MD      dicyclomine  20 mg Oral TID Ophelia Templeton MD      docusate sodium  100 mg Oral BID Ophelia Templeton MD      enoxaparin  40 mg Subcutaneous Daily Ophelia Templeton MD      escitalopram  10 mg Oral Daily Ophelia Templeton MD      gabapentin  400 mg Oral TID Ophelia Templeton MD      insulin glargine  25 Units Subcutaneous HS Maris Krause MD      insulin lispro  1-5 Units Subcutaneous TID AC Maris Krause MD      insulin lispro  1-5 Units Subcutaneous HS Maris Krause MD      lactated ringers  75 mL/hr Intravenous Continuous Ophelia Templeton MD 75 mL/hr (10/16/23 0846)    levothyroxine  25 mcg Oral Early Morning Ophelia Templeton MD      metoclopramide  10 mg Oral BID Ophelia Templeton MD      metoprolol tartrate  50 mg Oral Q12H [address] Maris Krause MD      morphine injection  2 mg Intravenous Q4H PRN Ophelia Templeton MD      nicotine  1 patch Transdermal Daily Maris Krause MD      ondansetron  4 mg Intravenous Q6H PRN Ophelia Templeton MD      pantoprazole  40 mg Intravenous Q12H [address] YONG Scherer      polyethylene glycol  17 g Oral Daily Ophelia Templeton MD      senna  1 tablet Oral Daily Maris Krause MD      sucralfate  1 g Oral 4x Daily (AC & HS) YONG Scherer      traZODone  200 mg Oral HS Ophelia Templeton MD          Today, Patient Was Seen By: YONG Antonio    **Please Note: This note may have been constructed using a voice recognition system. **

## 2023-10-16 NOTE — PROGRESS NOTES
ADT alert received. Patient was admitted to [facility] with abdominal pain. I will follow up at discharge.

## 2023-10-17 LAB
ALBUMIN SERPL BCP-MCNC: 3.7 G/DL (ref 3.5–5)
ALP SERPL-CCNC: 150 U/L (ref 34–104)
ALT SERPL W P-5'-P-CCNC: 78 U/L (ref 7–52)
ANION GAP SERPL CALCULATED.3IONS-SCNC: 6 MMOL/L
AST SERPL W P-5'-P-CCNC: 43 U/L (ref 13–39)
BASOPHILS # BLD AUTO: 0.02 THOUSANDS/ÂΜL (ref 0–0.1)
BASOPHILS NFR BLD AUTO: 0 % (ref 0–1)
BILIRUB SERPL-MCNC: 0.35 MG/DL (ref 0.2–1)
BUN SERPL-MCNC: 7 MG/DL (ref 5–25)
CALCIUM SERPL-MCNC: 8.9 MG/DL (ref 8.4–10.2)
CHLORIDE SERPL-SCNC: 104 MMOL/L (ref 96–108)
CO2 SERPL-SCNC: 30 MMOL/L (ref 21–32)
CREAT SERPL-MCNC: 0.59 MG/DL (ref 0.6–1.3)
EOSINOPHIL # BLD AUTO: 0.08 THOUSAND/ÂΜL (ref 0–0.61)
EOSINOPHIL NFR BLD AUTO: 1 % (ref 0–6)
ERYTHROCYTE [DISTWIDTH] IN BLOOD BY AUTOMATED COUNT: 12.3 % (ref 11.6–15.1)
GFR SERPL CREATININE-BSD FRML MDRD: 102 ML/MIN/1.73SQ M
GLUCOSE SERPL-MCNC: 107 MG/DL (ref 65–140)
GLUCOSE SERPL-MCNC: 115 MG/DL (ref 65–140)
GLUCOSE SERPL-MCNC: 84 MG/DL (ref 65–140)
GLUCOSE SERPL-MCNC: 87 MG/DL (ref 65–140)
GLUCOSE SERPL-MCNC: 87 MG/DL (ref 65–140)
HCT VFR BLD AUTO: 36.6 % (ref 34.8–46.1)
HGB BLD-MCNC: 11.9 G/DL (ref 11.5–15.4)
IMM GRANULOCYTES # BLD AUTO: 0.01 THOUSAND/UL (ref 0–0.2)
IMM GRANULOCYTES NFR BLD AUTO: 0 % (ref 0–2)
LYMPHOCYTES # BLD AUTO: 1.56 THOUSANDS/ÂΜL (ref 0.6–4.47)
LYMPHOCYTES NFR BLD AUTO: 21 % (ref 14–44)
MCH RBC QN AUTO: 29 PG (ref 26.8–34.3)
MCHC RBC AUTO-ENTMCNC: 32.5 G/DL (ref 31.4–37.4)
MCV RBC AUTO: 89 FL (ref 82–98)
MONOCYTES # BLD AUTO: 0.67 THOUSAND/ÂΜL (ref 0.17–1.22)
MONOCYTES NFR BLD AUTO: 9 % (ref 4–12)
NEUTROPHILS # BLD AUTO: 4.95 THOUSANDS/ÂΜL (ref 1.85–7.62)
NEUTS SEG NFR BLD AUTO: 69 % (ref 43–75)
NRBC BLD AUTO-RTO: 0 /100 WBCS
PLATELET # BLD AUTO: 273 THOUSANDS/UL (ref 149–390)
PMV BLD AUTO: 9.6 FL (ref 8.9–12.7)
POTASSIUM SERPL-SCNC: 3.7 MMOL/L (ref 3.5–5.3)
PROT SERPL-MCNC: 6.5 G/DL (ref 6.4–8.4)
RBC # BLD AUTO: 4.11 MILLION/UL (ref 3.81–5.12)
SODIUM SERPL-SCNC: 140 MMOL/L (ref 135–147)
WBC # BLD AUTO: 7.29 THOUSAND/UL (ref 4.31–10.16)

## 2023-10-17 PROCEDURE — 82948 REAGENT STRIP/BLOOD GLUCOSE: CPT

## 2023-10-17 PROCEDURE — 99232 SBSQ HOSP IP/OBS MODERATE 35: CPT | Performed by: PHYSICIAN ASSISTANT

## 2023-10-17 PROCEDURE — 99232 SBSQ HOSP IP/OBS MODERATE 35: CPT | Performed by: INTERNAL MEDICINE

## 2023-10-17 PROCEDURE — 80053 COMPREHEN METABOLIC PANEL: CPT

## 2023-10-17 PROCEDURE — C9113 INJ PANTOPRAZOLE SODIUM, VIA: HCPCS | Performed by: NURSE PRACTITIONER

## 2023-10-17 PROCEDURE — 85025 COMPLETE CBC W/AUTO DIFF WBC: CPT

## 2023-10-17 RX ORDER — ENOXAPARIN SODIUM 100 MG/ML
40 INJECTION SUBCUTANEOUS DAILY
Status: DISCONTINUED | OUTPATIENT
Start: 2023-10-19 | End: 2023-10-19 | Stop reason: HOSPADM

## 2023-10-17 RX ORDER — PANTOPRAZOLE SODIUM 40 MG/1
40 TABLET, DELAYED RELEASE ORAL EVERY 12 HOURS SCHEDULED
Status: DISCONTINUED | OUTPATIENT
Start: 2023-10-17 | End: 2023-10-19 | Stop reason: HOSPADM

## 2023-10-17 RX ADMIN — METOPROLOL TARTRATE 50 MG: 50 TABLET, FILM COATED ORAL at 08:25

## 2023-10-17 RX ADMIN — MORPHINE SULFATE 2 MG: 2 INJECTION, SOLUTION INTRAMUSCULAR; INTRAVENOUS at 06:03

## 2023-10-17 RX ADMIN — GABAPENTIN 400 MG: 400 CAPSULE ORAL at 21:51

## 2023-10-17 RX ADMIN — METOCLOPRAMIDE 10 MG: 10 TABLET ORAL at 17:10

## 2023-10-17 RX ADMIN — METOCLOPRAMIDE 10 MG: 10 TABLET ORAL at 08:25

## 2023-10-17 RX ADMIN — SODIUM CHLORIDE, SODIUM LACTATE, POTASSIUM CHLORIDE, AND CALCIUM CHLORIDE 75 ML/HR: .6; .31; .03; .02 INJECTION, SOLUTION INTRAVENOUS at 00:02

## 2023-10-17 RX ADMIN — ENOXAPARIN SODIUM 40 MG: 40 INJECTION SUBCUTANEOUS at 08:26

## 2023-10-17 RX ADMIN — SUCRALFATE 1 G: 1 TABLET ORAL at 17:08

## 2023-10-17 RX ADMIN — CLONAZEPAM 0.5 MG: 0.5 TABLET ORAL at 21:51

## 2023-10-17 RX ADMIN — GABAPENTIN 400 MG: 400 CAPSULE ORAL at 08:25

## 2023-10-17 RX ADMIN — SUCRALFATE 1 G: 1 TABLET ORAL at 11:42

## 2023-10-17 RX ADMIN — DOCUSATE SODIUM 100 MG: 100 CAPSULE, LIQUID FILLED ORAL at 08:25

## 2023-10-17 RX ADMIN — METOPROLOL TARTRATE 50 MG: 50 TABLET, FILM COATED ORAL at 21:51

## 2023-10-17 RX ADMIN — PANTOPRAZOLE SODIUM 40 MG: 40 INJECTION, POWDER, FOR SOLUTION INTRAVENOUS at 08:26

## 2023-10-17 RX ADMIN — ALUMINUM HYDROXIDE, MAGNESIUM HYDROXIDE, AND SIMETHICONE 30 ML: 200; 200; 20 SUSPENSION ORAL at 21:50

## 2023-10-17 RX ADMIN — MORPHINE SULFATE 2 MG: 2 INJECTION, SOLUTION INTRAMUSCULAR; INTRAVENOUS at 10:30

## 2023-10-17 RX ADMIN — ASPIRIN 81 MG CHEWABLE TABLET 81 MG: 81 TABLET CHEWABLE at 08:25

## 2023-10-17 RX ADMIN — ARIPIPRAZOLE 10 MG: 5 TABLET ORAL at 08:25

## 2023-10-17 RX ADMIN — TRAZODONE HYDROCHLORIDE 200 MG: 100 TABLET ORAL at 21:51

## 2023-10-17 RX ADMIN — POLYETHYLENE GLYCOL 3350 17 G: 17 POWDER, FOR SOLUTION ORAL at 08:26

## 2023-10-17 RX ADMIN — MORPHINE SULFATE 2 MG: 2 INJECTION, SOLUTION INTRAMUSCULAR; INTRAVENOUS at 19:40

## 2023-10-17 RX ADMIN — ATORVASTATIN CALCIUM 20 MG: 20 TABLET, FILM COATED ORAL at 08:26

## 2023-10-17 RX ADMIN — DOCUSATE SODIUM 100 MG: 100 CAPSULE, LIQUID FILLED ORAL at 17:08

## 2023-10-17 RX ADMIN — DICYCLOMINE HYDROCHLORIDE 20 MG: 10 CAPSULE ORAL at 08:25

## 2023-10-17 RX ADMIN — MORPHINE SULFATE 2 MG: 2 INJECTION, SOLUTION INTRAMUSCULAR; INTRAVENOUS at 14:32

## 2023-10-17 RX ADMIN — PANTOPRAZOLE SODIUM 40 MG: 40 TABLET, DELAYED RELEASE ORAL at 21:51

## 2023-10-17 RX ADMIN — MORPHINE SULFATE 2 MG: 2 INJECTION, SOLUTION INTRAMUSCULAR; INTRAVENOUS at 02:00

## 2023-10-17 RX ADMIN — DICYCLOMINE HYDROCHLORIDE 20 MG: 10 CAPSULE ORAL at 17:08

## 2023-10-17 RX ADMIN — DICYCLOMINE HYDROCHLORIDE 20 MG: 10 CAPSULE ORAL at 21:51

## 2023-10-17 RX ADMIN — LEVOTHYROXINE SODIUM 25 MCG: 25 TABLET ORAL at 06:03

## 2023-10-17 RX ADMIN — SUCRALFATE 1 G: 1 TABLET ORAL at 21:51

## 2023-10-17 RX ADMIN — ESCITALOPRAM OXALATE 10 MG: 10 TABLET ORAL at 08:25

## 2023-10-17 RX ADMIN — SENNOSIDES 8.6 MG: 8.6 TABLET, FILM COATED ORAL at 08:25

## 2023-10-17 RX ADMIN — SUCRALFATE 1 G: 1 TABLET ORAL at 08:25

## 2023-10-17 RX ADMIN — NICOTINE 1 PATCH: 14 PATCH, EXTENDED RELEASE TRANSDERMAL at 08:28

## 2023-10-17 RX ADMIN — GABAPENTIN 400 MG: 400 CAPSULE ORAL at 17:08

## 2023-10-17 NOTE — PROGRESS NOTES
[address]  Progress Note  Name: Buster Restrepo  MRN: [id number]  Unit/Bed#: -01 I Date of Admission: 10/15/2023   Date of Service: 10/17/2023 I Hospital Day: 1    Assessment/Plan   * Abdominal pain  Assessment & Plan  Presented with intractable RUQ and epigastric abdominal pain with reflux-like symptoms. Hx gastroparesis, recent EGD with botox on 10/10. Likely in setting of gastroparesis  CT a/p w/ contrast shows no acute abdominal pelvic pathology  RUQ US with no acute pathologies, known hypoattenuated liver lesions compatible with hepatic hemangiomas  Mild LFT elevation, otherwise stable labs and nl lipase  GI following, continue full liquid/low residue diet & may consider MRI/MRCP  Continue PPI BID, Reglan BID, Carafate QID  Avoid opiates, will give stool softeners    Diabetes mellitus type 2 in obese   Assessment & Plan  Lab Results   Component Value Date    HGBA1C 5.5 09/03/2023       Recent Labs     10/16/23  1644 10/16/23  2106 10/17/23  0712 10/17/23  1120   POCGLU 97 199* 87 107         Blood Sugar Average: Last 72 hrs:  (P) 105.875    Home Regimen: Lantus 25 units qHS and Metformin  Hold Oral medications while admitted  Continue home Lantus, SSI coverage while inpatient  Hypoglycemic protocol  Diabetic Diet      Tobacco abuse  Assessment & Plan  Educated on smoking cessation  NRT    Acquired hypothyroidism  Assessment & Plan  Continue levothyroxine    Bipolar 1 disorder, depressed (HCC)  Assessment & Plan  Continue Lexapro, aripiprazole and trazodone  Mood currently stable    Essential hypertension  Assessment & Plan  BP reviewed and fluctuating  Continue metoprolol               VTE Pharmacologic Prophylaxis: VTE Score: 4 Moderate Risk (Score 3-4) - Pharmacological DVT Prophylaxis Ordered: enoxaparin (Lovenox). Patient Centered Rounds: I performed bedside rounds with nursing staff today.   Discussions with Specialists or Other Care Team Provider: GI, case management    Education and Discussions with Family / Patient: Patient declined call to . Total Time Spent on Date of Encounter in care of patient: 45 mins. This time was spent on one or more of the following: performing physical exam; counseling and coordination of care; obtaining or reviewing history; documenting in the medical record; reviewing/ordering tests, medications or procedures; communicating with other healthcare professionals and discussing with patient's family/caregivers. Current Length of Stay: 1 day(s)  Current Patient Status: Inpatient   Certification Statement: The patient will continue to require additional inpatient hospital stay due to clinical improvement, MRCP results  Discharge Plan: Anticipate discharge in 24-48 hrs to home. Code Status: Level 1 - Full Code    Subjective:   Patient seen at bedside this a.m., reports tolerating diet well with no worsening of abdominal distention/pain after meals but still persistent. Passing gas, belching with heartburn but has had loose BM today, denies chest pain, SOB, nausea/vomiting. Objective:     Vitals:   Temp (24hrs), Av.2 °F (36.8 °C), Min:98.1 °F (36.7 °C), Max:98.3 °F (36.8 °C)    Temp:  [98.1 °F (36.7 °C)-98.3 °F (36.8 °C)] 98.1 °F (36.7 °C)  HR:  [52-60] 57  Resp:  [16-20] 20  BP: (137-160)/(71-75) 160/75  SpO2:  [94 %-97 %] 95 %  Body mass index is 28.15 kg/m². Input and Output Summary (last 24 hours): Intake/Output Summary (Last 24 hours) at 10/17/2023 1334  Last data filed at 10/17/2023 0601  Gross per 24 hour   Intake 350 ml   Output --   Net 350 ml       Physical Exam:   Physical Exam  Constitutional:       General: She is not in acute distress. Appearance: She is not ill-appearing, toxic-appearing or diaphoretic. Cardiovascular:      Rate and Rhythm: Normal rate and regular rhythm. Pulses: Normal pulses. Heart sounds: Normal heart sounds.    Pulmonary:      Effort: Pulmonary effort is normal. No respiratory distress. Breath sounds: Normal breath sounds. Abdominal:      General: Bowel sounds are normal. There is distension. Palpations: Abdomen is soft. Tenderness: There is abdominal tenderness. There is no guarding. Comments: Mild abdominal distention, tenderness to RUQ. Musculoskeletal:         General: No swelling or tenderness. Skin:     General: Skin is warm. Coloration: Skin is not jaundiced or pale. Neurological:      General: No focal deficit present. Mental Status: She is alert.    Psychiatric:         Mood and Affect: Mood normal.         Behavior: Behavior normal.          Additional Data:     Labs:  Results from last 7 days   Lab Units 10/17/23  0512   WBC Thousand/uL 7.29   HEMOGLOBIN g/dL 11.9   HEMATOCRIT % 36.6   PLATELETS Thousands/uL 273   NEUTROS PCT % 69   LYMPHS PCT % 21   MONOS PCT % 9   EOS PCT % 1     Results from last 7 days   Lab Units 10/17/23  0512   SODIUM mmol/L 140   POTASSIUM mmol/L 3.7   CHLORIDE mmol/L 104   CO2 mmol/L 30   BUN mg/dL 7   CREATININE mg/dL 0.59*   ANION GAP mmol/L 6   CALCIUM mg/dL 8.9   ALBUMIN g/dL 3.7   TOTAL BILIRUBIN mg/dL 0.35   ALK PHOS U/L 150*   ALT U/L 78*   AST U/L 43*   GLUCOSE RANDOM mg/dL 87         Results from last 7 days   Lab Units 10/17/23  1120 10/17/23  0712 10/16/23  2106 10/16/23  1644 10/16/23  1059 10/16/23  0717 10/15/23  2129 10/15/23  1653   POC GLUCOSE mg/dl 107 87 199* 97 103 93 83 78               Lines/Drains:  Invasive Devices       Peripheral Intravenous Line  Duration             Peripheral IV 10/17/23 Left;Ventral (anterior) Forearm <1 day                          Imaging: Reviewed radiology reports from this admission including: abdominal/pelvic CT    Recent Cultures (last 7 days):         Last 24 Hours Medication List:   Current Facility-Administered Medications   Medication Dose Route Frequency Provider Last Rate    aluminum-magnesium hydroxide-simethicone  30 mL Oral Q6H PRN Georgina Mayo MD      ARIPiprazole  10 mg Oral Daily Maris Gao MD      aspirin  81 mg Oral Daily Georgina Mayo MD      atorvastatin  20 mg Oral Daily Maris Gao MD      clonazePAM  0.5 mg Oral Daily PRN Georgina Mayo MD      dicyclomine  20 mg Oral TID Georgina Mayo MD      docusate sodium  100 mg Oral BID Georgina Mayo MD      enoxaparin  40 mg Subcutaneous Daily Georgina Mayo MD      escitalopram  10 mg Oral Daily Georgina Mayo MD      gabapentin  400 mg Oral TID Georgina Mayo MD      insulin glargine  25 Units Subcutaneous HS Maris Gao MD      insulin lispro  1-5 Units Subcutaneous TID AC Maris Gao MD      insulin lispro  1-5 Units Subcutaneous HS Maris Gao MD      lactated ringers  75 mL/hr Intravenous Continuous Georgina Mayo MD 75 mL/hr (10/17/23 0002)    levothyroxine  25 mcg Oral Early Morning Maris Gao MD      metoclopramide  10 mg Oral BID Georgina Mayo MD      metoprolol tartrate  50 mg Oral Q12H [address] Maris Gao MD      morphine injection  2 mg Intravenous Q4H PRN Georgina Mayo MD      nicotine  1 patch Transdermal Daily Maris Gao MD      ondansetron  4 mg Intravenous Q6H PRN Georgina Mayo MD      pantoprazole  40 mg Oral Q12H [address] YONG Mosqueda      polyethylene glycol  17 g Oral Daily Georgina Mayo MD      senna  1 tablet Oral Daily Maris Gao MD      sucralfate  1 g Oral 4x Daily (AC & HS) YONG Mosqueda      traZODone  200 mg Oral HS Georgina Mayo MD          Today, Patient Was Seen By: Rebecca Jarrett PA-C    **Please Note: This note may have been constructed using a voice recognition system. **

## 2023-10-17 NOTE — APP STUDENT NOTE
CESARIO STUDENT  Inpatient Progress Note for TRAINING ONLY  Not Part of Legal Medical Record       Progress Note - [facility] 66 y.o. female MRN: [id number]    Unit/Bed#: -[facility] Encounter: [id number]      Assessment:  Gastroparesis s/p EDG with Botox. EDG with Botox injection 10/10  RUQ US 10/16 showed no acute pathology. Stable hepatic hemangioma  Increase in LFT's since yesterday: AST 43, ALT 78, Alk Phos 150  RUQ abdominal pain   Likely secondary to gastroparesis and hx of GERD  Diabetes Mellitis Type 2  Well controlled, last A1C 5.5 on 9/3/2023  Home regimen of Lantus 25 units qHS and metformin  Essential hypertension    Plan:    - Continue Reglan 10 mg BID  - Continue Protonix 40 mg BID  - Continue Carafate QID  - Trend CMP and CBC in AM  2.   - GI following, may consider MRI/MRCP  - Continue full liquid/low residue diet  3.    - Continue Lantus and sliding scale insulin coverage  4.  - Continue metoprolol 50 mg BID      Subjective:   Percy Boss is a 66year old female with a past medical history of gastroparesis, GERD, DM2, and HTN complaining of persistent RUQ pain. She had a EDG with Botox injections on 10/10, after which her pain improved, but then returned and has since been constant. Today she notes she has 9/10 stabbing pain in her RUQ radiating to her mid back. Despite her pain, she is tolerating clear liquids and would like to advance her diet. She denies any shortness of breath, chest pain, vomiting, diarrhea, or nausea. Objective:     Vitals: Blood pressure 160/75, pulse 57, temperature 98.1 °F (36.7 °C), temperature source Oral, resp. rate 20, height 5' 3" (1.6 m), weight 72.1 kg (158 lb 14.4 oz), SpO2 95 %, not currently breastfeeding. ,Body mass index is 28.15 kg/m².       Intake/Output Summary (Last 24 hours) at 10/17/2023 1136  Last data filed at 10/17/2023 0601  Gross per 24 hour   Intake 350 ml   Output --   Net 350 ml       Physical Exam: /75 (BP Location: Right arm)   Pulse 57 Temp 98.1 °F (36.7 °C) (Oral)   Resp 20   Ht 5' 3" (1.6 m)   Wt 72.1 kg (158 lb 14.4 oz)   SpO2 95%   BMI 28.15 kg/m²   General appearance: alert and oriented, in no acute distress and appears stated age  Head: Normocephalic, without obvious abnormality, atraumatic  Eyes: conjunctivae/corneas clear. PERRL, EOM's intact. Fundi benign. Lungs: clear to auscultation bilaterally  Heart: regular rate and rhythm, S1, S2 normal, no murmur, click, rub or gallop  Abdomen: abnormal findings:  moderate tenderness in the epigastrium and in the RUQ  Extremities: extremities normal, warm and well-perfused; no cyanosis, clubbing, or edema  Pulses: 2+ and symmetric     Invasive Devices       Peripheral Intravenous Line  Duration             Peripheral IV 10/17/23 Left;Ventral (anterior) Forearm <1 day                    Lab, Imaging and other studies: I have personally reviewed pertinent reports.     VTE Pharmacologic Prophylaxis: Enoxaparin (Lovenox)  VTE Mechanical Prophylaxis: sequential compression device

## 2023-10-17 NOTE — CASE MANAGEMENT
Case Management Assessment & Discharge Planning Note    Patient name Ally Bran  Location [address] [facility] [place] [id number] MRN [id number]  : 1965 Date 10/17/2023       Current Admission Date: 10/15/2023  Current Admission Diagnosis:Abdominal pain   Patient Active Problem List    Diagnosis Date Noted    Dysarthria 2023    Migraine without aura and without status migrainosus, not intractable 2023    Long-term use of high-risk medication 2023    Pulmonary emphysema, unspecified emphysema type ([address]) 2023    Screening for blood or protein in urine 2022    Chronic cough 2022    Bronchitis with acute wheezing 2022    Cigarette smoker 2022    Tachypnea 2022    Hypertensive urgency 2022    Tachycardia 2022    Diabetes mellitus type 2 in obese  2021    Primary hypertension 2021    Hypothyroidism 2021    Vitamin D deficiency 2021    Tiredness 2021    Obesity, morbid ([address]) 2021    Chest pressure 2021    Increased anion gap metabolic acidosis     STI (sexually transmitted infection) 2021    Gastroesophageal reflux disease without esophagitis 2020    Toxic encephalopathy 2020    Bipolar 1 disorder, depressed ([address]) 2020    Benzodiazepine dependence, continuous ([address]) 2020    Non compliance w medication regimen 2020    Acquired hypothyroidism 2020    Hypokalemia 2020    Hyperlipemia 2020    Transaminitis 2020    Cognitive dysfunction in bipolar disorder ([address]) 2020    Medical clearance for psychiatric admission 2020    Tobacco abuse 2020    Bipolar disorder current episode depressed ([address]) 2020    Elevated lactic acid level 2019    Post-traumatic stress disorder, chronic 2019    Alcohol abuse, in remission 2019    Abdominal pain 2018    Essential hypertension 2018    Generalized anxiety disorder 2017 LOS (days): 1  Geometric Mean LOS (GMLOS) (days): 2.90  Days to GMLOS:2     OBJECTIVE:    Risk of Unplanned Readmission Score: 31.74         Current admission status: Inpatient       Preferred Pharmacy:   [address] [facility] [address], [address] [address]  Phone: [phone] Fax: [address], [address] [id number] [facility]  Phone: [phone] Fax: [phone]    Primary Care Provider: YONG Dominguez    Primary Insurance: [facility]  Secondary Insurance:     ASSESSMENT:  Narendra Proxies    There are no active Health Care Proxies on file. Readmission Root Cause  30 Day Readmission: No    Patient Information  Admitted from[de-identified] Home  Mental Status: Alert  During Assessment patient was accompanied by: Not accompanied during assessment  Assessment information provided by[de-identified] Patient  Primary Caregiver: Self  Caregiver's Name[de-identified] Yolanda Lyn Relationship to Patient[de-identified] Significant Other  Caregiver's Telephone Number[de-identified] [phone]  Support Systems: Self, Spouse/significant other, Family members  [facility]: [facility] [address] [place] [address] do you live in?: [facility] entry access options.  Select all that apply.: No steps to enter home  Type of Current Residence: 2 story home  Upon entering residence, is there a bedroom on the main floor (no further steps)?: No  A bedroom is located on the following floor levels of residence (select all that apply):: 2nd Floor  Upon entering residence, is there a bathroom on the main floor (no further steps)?: No  Indicate which floors of current residence have a bathroom (select all the apply):: 2nd Floor  Number of steps to 2nd floor from main floor: One Flight  In the last 12 months, was there a time when you were not able to pay the mortgage or rent on time?: No  In the last 12 months, how many places have you lived?: 1  In the last 12 months, was there a time when you did not have a steady place to sleep or slept in a shelter (including now)?: No  Homeless/housing insecurity resource given?: N/A  Living Arrangements: Lives w/ Spouse/significant other    Activities of Daily Living Prior to Admission  Functional Status: Independent  Completes ADLs independently?: Yes  Ambulates independently?: Yes  Does patient use assisted devices?: No  Does patient currently own DME?: No  Does patient have a history of Outpatient Therapy (PT/OT)?: No  Does the patient have a history of Short-Term Rehab?: No  Does patient have a history of HHC?: No  Does patient currently have [facility]?: No         Patient Information Continued  Income Source: SSI/SSD  Within the past 12 months, you worried that your food would run out before you got the money to buy more.: Never true  Within the past 12 months, the food you bought just didn't last and you didn't have money to get more.: Never true  Food insecurity resource given?: N/A  Does patient have a history of Mental Health Diagnosis?: Yes ([address])         Means of Transportation  In the past 12 months, has lack of transportation kept you from medical appointments or from getting medications?: No  In the past 12 months, has lack of transportation kept you from meetings, work, or from getting things needed for daily living?: No  Was application for public transport provided?: N/A        DISCHARGE DETAILS:     CM called and left message for patient's Son Valdo Echols to introduce role, confirm accuracy of assessment information, and discuss discharge planning. No needs anticipated at this time, CM will continue to follow through to discharge.

## 2023-10-17 NOTE — ASSESSMENT & PLAN NOTE
Lab Results   Component Value Date    HGBA1C 5.5 09/03/2023       Recent Labs     10/16/23  1644 10/16/23  2106 10/17/23  0712 10/17/23  1120   POCGLU 97 199* 87 107         Blood Sugar Average: Last 72 hrs:  (P) 105.875    Home Regimen: Lantus 25 units qHS and Metformin  Hold Oral medications while admitted  Continue home Lantus, SSI coverage while inpatient  Hypoglycemic protocol  Diabetic Diet

## 2023-10-17 NOTE — ASSESSMENT & PLAN NOTE
Presented with intractable RUQ and epigastric abdominal pain with reflux-like symptoms. Hx gastroparesis, recent EGD with botox on 10/10.    Likely in setting of gastroparesis  CT a/p w/ contrast shows no acute abdominal pelvic pathology  RUQ US with no acute pathologies, known hypoattenuated liver lesions compatible with hepatic hemangiomas  Mild LFT elevation, otherwise stable labs and nl lipase  GI following, continue full liquid/low residue diet & may consider MRI/MRCP  Continue PPI BID, Reglan BID, Carafate QID  Avoid opiates, will give stool softeners

## 2023-10-17 NOTE — PLAN OF CARE
Problem: PAIN - ADULT  Goal: Verbalizes/displays adequate comfort level or baseline comfort level  Description: Interventions:  - Encourage patient to monitor pain and request assistance  - Assess pain using appropriate pain scale  - Administer analgesics based on type and severity of pain and evaluate response  - Implement non-pharmacological measures as appropriate and evaluate response  - Consider cultural and social influences on pain and pain management  - Notify physician/advanced practitioner if interventions unsuccessful or patient reports new pain  Outcome: Progressing     Problem: GASTROINTESTINAL - ADULT  Goal: Minimal or absence of nausea and/or vomiting  Description: INTERVENTIONS:  - Administer IV fluids if ordered to ensure adequate hydration  - Maintain NPO status until nausea and vomiting are resolved  - Nasogastric tube if ordered  - Administer ordered antiemetic medications as needed  - Provide nonpharmacologic comfort measures as appropriate  - Advance diet as tolerated, if ordered  - Consider nutrition services referral to assist patient with adequate nutrition and appropriate food choices  Outcome: Progressing     Problem: METABOLIC, FLUID AND ELECTROLYTES - ADULT  Goal: Fluid balance maintained  Description: INTERVENTIONS:  - Monitor labs   - Monitor I/O and WT  - Instruct patient on fluid and nutrition as appropriate  - Assess for signs & symptoms of volume excess or deficit  Outcome: Progressing

## 2023-10-17 NOTE — PROGRESS NOTES
The pantoprazole has been converted to Oral per [facility] IV-to-PO Auto-Conversion Protocol for Adults as approved by the Pharmacy and Therapeutics Committee. The patient met all eligible criteria:  3 Age = 25years old   2) Received at least one dose of the IV form   3) Receiving at least one other scheduled oral/enteral medication   4) Tolerating an oral/enteral diet   and did not have any exclusions:   1) Critical care patient   2) Active GI bleed (IF assessing H2RAs or PPIs)   3) Continuous tube feeding (IF assessing cipro, doxycycline, levofloxacin, minocycline, rifampin, or voriconazole)   4) Receiving PO vancomycin (IF assessing metronidazole)   5) Persistent nausea and/or vomiting   6) Ileus or gastrointestinal obstruction   7) Elizabeth/nasogastric tube set for continuous suction   8) Specific order not to automatically convert to PO (in the order's comments or if discussed in the most recent Infectious Disease or primary team's progress notes).

## 2023-10-17 NOTE — UTILIZATION REVIEW
Date: 10/17/23    Day 3: Has surpassed a 2nd midnight with active treatments and services, which include cont ivf; cont iv protonix; pain / nausea control. See initial review completed by Vahid Lanza on 10/16/23.

## 2023-10-17 NOTE — PROGRESS NOTES
Progress Note- Maddy Delacruz 66 y.o. female MRN: [id number]    Unit/Bed#: -01 Encounter: [id number]      Assessment and Plan:    66 y.o. female with history of bipolar disorder, substance abuse, hypothyroidism, T2DM, HLD, HTN, cholecystectomy, gastroparesis with recent EGD botox 10/10 who presented 10/15 due to upper abdominal pain, reflux, and nausea. CBC, CMP, Lipase, CT abdomen pelvis unremarkable. #1 Upper abdominal pain   #2 Elevated LFTs  Epigastric/RUQ pain likely multifactorial secondary to diabetic gastroparesis, GERD, possible bile reflux, ?biliary w/ new elevation LFTs. Discussed with patient that it could take weeks for Botox to take effect after intra pyloric Botox injection. She was previously on Ozempic which was stopped. LFTs mildly yesterday, now AST/ALT trending down but Alk phos trending up to 150. RUQ US done showed CBD 8mm but this was felt to be due to post cholecystectomy state, she otherwise had evidence of a 1.3 suspected hemangioma and enlarged 21.6cm liver secondary to Reidel's lobe, liver appeared smooth in contour. She remains afebrile with no leukocytosis       -Advance diet to full liquid, low fat/low residue toast/crackers  -Obtain MRI/MRCP. Spoke to radiology and this is planned for 10 am tomorrow. Will make NPO past midnight in case further intervention with ERCP needed  -Continue Reglan twice daily  -Continue PPI twice daily, Carafate QID  -Tight glycemic control  -Avoid medications which slow GI motility such as narcotics, anticholinergics, etc.  -Continue supportive care with IVF, repletion of electrolytes as tolerated  -Monitor CBC, CMP, abdominal exam     #4 Constipation  -Continue bowel regimen with MiraLAX    ______________________________________________________________________    Subjective:     Pt denies N/V but continues with epigastric/RUQ pain radiating to the back requiring morphine. Worsens with oral intake.  She is passing gas and moving her bowels.     Medication Administration - last 24 hours from 10/16/2023 0903 to 10/17/2023 5582         Date/Time Order Dose Route Action Action by     10/17/2023 0002 EDT lactated ringers infusion 75 mL/hr Intravenous [address] Lucinda Love, MARK ANTHONY     10/17/2023 0825 EDT docusate sodium (COLACE) capsule 100 mg 100 mg Oral Given Harman Ryan RN     10/16/2023 1733 EDT docusate sodium (COLACE) capsule 100 mg 100 mg Oral Given Anh Hoffman, MARK ANTHONY     10/17/2023 0825 EDT senna (SENOKOT) tablet 8.6 mg 8.6 mg Oral Given Harman Ryan RN     10/17/2023 2449 EDT polyethylene glycol (MIRALAX) packet 17 g 17 g Oral Given Harman Ryan, MARK ANTHONY     10/17/2023 1874 EDT nicotine (NICODERM CQ) 14 mg/24hr TD 24 hr patch 1 patch 1 patch Transdermal Medication Applied Harman Ryan RN     10/17/2023 0826 EDT nicotine (NICODERM CQ) 14 mg/24hr TD 24 hr patch 1 patch 1 patch Transdermal Patch Removed Harman Ryan RN     10/17/2023 0826 EDT enoxaparin (LOVENOX) subcutaneous injection 40 mg 40 mg Subcutaneous Given Harman Ryan, MARK ANTHONY     10/16/2023 1417 EDT aluminum-magnesium hydroxide-simethicone (MAALOX) oral suspension 30 mL 30 mL Oral Given Willena Hodgkin, MARK ANTHONY     10/17/2023 0825 EDT dicyclomine (BENTYL) capsule 20 mg 20 mg Oral Given Harman Ryan RN     10/16/2023 2152 EDT dicyclomine (BENTYL) capsule 20 mg 20 mg Oral Given Lucinda Love, MARK ANTHONY     10/16/2023 1733 EDT dicyclomine (BENTYL) capsule 20 mg 20 mg Oral Given Anh Hoffman, MARK ANTHONY     10/17/2023 0603 EDT morphine injection 2 mg 2 mg Intravenous Given Lucinda Love, RN     10/17/2023 0200 EDT morphine injection 2 mg 2 mg Intravenous Given Lucinda Love, RN     10/16/2023 2152 EDT morphine injection 2 mg 2 mg Intravenous Given Lucinda Love RN     10/16/2023 1800 EDT morphine injection 2 mg 2 mg Intravenous Given Anh Hoffman RN     10/16/2023 1359 EDT morphine injection 2 mg 2 mg Intravenous Given Willena Hodgkin, RN     10/16/2023 0906 EDT morphine injection 2 mg 2 mg Intravenous Given Deepti Davis, RN     10/17/2023 0825 EDT ARIPiprazole (ABILIFY) tablet 10 mg 10 mg Oral Given Sanjeev Kc, MARK ANTHONY     10/17/2023 0825 EDT aspirin chewable tablet 81 mg 81 mg Oral Given Sanjeev Kc, RN     10/17/2023 8572 EDT atorvastatin (LIPITOR) tablet 20 mg 20 mg Oral Given Sanjeev Kc, MARK ANTHONY     10/17/2023 0825 EDT escitalopram (LEXAPRO) tablet 10 mg 10 mg Oral Given Sanjeev Kc, RN     10/17/2023 0825 EDT gabapentin (NEURONTIN) capsule 400 mg 400 mg Oral Given Sanjeev Kc, RN     10/16/2023 2152 EDT gabapentin (NEURONTIN) capsule 400 mg 400 mg Oral Given Randy Camp, RN     10/16/2023 1733 EDT gabapentin (NEURONTIN) capsule 400 mg 400 mg Oral Given Deepti Davis, RN     10/16/2023 2153 EDT insulin glargine (LANTUS) subcutaneous injection 25 Units 0.25 mL 25 Units Subcutaneous Given Randy Camp, RN     10/17/2023 3382 EDT levothyroxine tablet 25 mcg 25 mcg Oral Given Randy Camp, RN     10/17/2023 0825 EDT metoprolol tartrate (LOPRESSOR) tablet 50 mg 50 mg Oral Given Sanjeev Kc, RN     10/16/2023 2152 EDT metoprolol tartrate (LOPRESSOR) tablet 50 mg 50 mg Oral Given Randy Camp, RN     10/17/2023 0825 EDT metoclopramide (REGLAN) tablet 10 mg 10 mg Oral Given Sanjeev Kc, MARK ANTHONY     10/16/2023 1733 EDT metoclopramide (REGLAN) tablet 10 mg 10 mg Oral Given Deepti Davis, RN     10/16/2023 2152 EDT traZODone (DESYREL) tablet 200 mg 200 mg Oral Given Randy Camp, RN     10/17/2023 3322 EDT insulin lispro (HumaLOG) 100 units/mL subcutaneous injection 1-5 Units -- Subcutaneous Not Given Sanjeev Kc, MARK ANTHONY     10/16/2023 1718 EDT insulin lispro (HumaLOG) 100 units/mL subcutaneous injection 1-5 Units -- Subcutaneous Not Given Deepti Davis RN     10/16/2023 1127 EDT insulin lispro (HumaLOG) 100 units/mL subcutaneous injection 1-5 Units -- Subcutaneous Not Given Deepti Davis RN     10/16/2023 2153 EDT insulin lispro (HumaLOG) 100 units/mL subcutaneous injection 1-5 Units 1 Units Subcutaneous Given Obrien Noam, RN     10/17/2023 0825 EDT sucralfate (CARAFATE) tablet 1 g 1 g Oral Given Mili Allan, RN     10/16/2023 2153 EDT sucralfate (CARAFATE) tablet 1 g 1 g Oral Given Obrien Noam, RN     10/16/2023 1733 EDT sucralfate (CARAFATE) tablet 1 g 1 g Oral Given Verna Ellis, RN     10/16/2023 1136 EDT sucralfate (CARAFATE) tablet 1 g 1 g Oral Given Verna Ellis, RN     10/17/2023 3211 EDT pantoprazole (PROTONIX) injection 40 mg 40 mg Intravenous Given Milijuli Lemus, RN     10/16/2023 2153 EDT pantoprazole (PROTONIX) injection 40 mg 40 mg Intravenous Given Obrien Noam, RN     10/16/2023 1136 EDT pantoprazole (PROTONIX) injection 40 mg 40 mg Intravenous Given Verna Corral, RN            Objective:     Vitals: Blood pressure 160/75, pulse 57, temperature 98.1 °F (36.7 °C), temperature source Oral, resp. rate 20, height 5' 3" (1.6 m), weight 72.1 kg (158 lb 14.4 oz), SpO2 95 %, not currently breastfeeding. ,Body mass index is 28.15 kg/m².       Intake/Output Summary (Last 24 hours) at 10/17/2023 0903  Last data filed at 10/17/2023 0601  Gross per 24 hour   Intake 590 ml   Output --   Net 590 ml       Physical Exam:   General Appearance: Awake and alert, in no acute distress  Abdomen: Soft, RUQ tender, non-distended; bowel sounds normal; no masses or no organomegaly    Invasive Devices       Peripheral Intravenous Line  Duration             Peripheral IV 10/17/23 Left;Ventral (anterior) Forearm <1 day                    Lab Results:  Admission on 10/15/2023   Component Date Value    WBC 10/15/2023 10.07     RBC 10/15/2023 4.60     Hemoglobin 10/15/2023 13.3     Hematocrit 10/15/2023 40.0     MCV 10/15/2023 87     MCH 10/15/2023 28.9     MCHC 10/15/2023 33.3     RDW 10/15/2023 12.6     MPV 10/15/2023 9.6     Platelets 87/66/6047 377     nRBC 10/15/2023 0     Neutrophils Relative 10/15/2023 61     Immat GRANS % 10/15/2023 0     Lymphocytes Relative 10/15/2023 31     Monocytes Relative 10/15/2023 7     Eosinophils Relative 10/15/2023 1     Basophils Relative 10/15/2023 0     Neutrophils Absolute 10/15/2023 6.10     Immature Grans Absolute 10/15/2023 0.03     Lymphocytes Absolute 10/15/2023 3.10     Monocytes Absolute 10/15/2023 0.71     Eosinophils Absolute 10/15/2023 0.10     Basophils Absolute 10/15/2023 0.03     Sodium 10/15/2023 138     Potassium 10/15/2023 4.0     Chloride 10/15/2023 101     CO2 10/15/2023 28     ANION GAP 10/15/2023 9     BUN 10/15/2023 12     Creatinine 10/15/2023 0.69     Glucose 10/15/2023 111     Calcium 10/15/2023 9.8     AST 10/15/2023 18     ALT 10/15/2023 14     Alkaline Phosphatase 10/15/2023 77     Total Protein 10/15/2023 7.9     Albumin 10/15/2023 4.6     Total Bilirubin 10/15/2023 0.45     eGFR 10/15/2023 97     Lipase 10/15/2023 13     hs TnI 0hr 10/15/2023 3     Ventricular Rate 10/15/2023 67     Atrial Rate 10/15/2023 67     NH Interval 10/15/2023 214     QRSD Interval 10/15/2023 66     QT Interval 10/15/2023 416     QTC Interval 10/15/2023 439     P Axis 10/15/2023 49     QRS Axis 10/15/2023 5     T Wave [place] 10/15/2023 32     POC Glucose 10/15/2023 78     POC Glucose 10/15/2023 83     Magnesium 10/16/2023 2.3     Sodium 10/16/2023 137     Potassium 10/16/2023 4.3     Chloride 10/16/2023 104     CO2 10/16/2023 27     ANION GAP 10/16/2023 6     BUN 10/16/2023 11     Creatinine 10/16/2023 0.59 (L)     Glucose 10/16/2023 94     Glucose, Fasting 10/16/2023 94     Calcium 10/16/2023 9.1     AST 10/16/2023 76 (H)     ALT 10/16/2023 86 (H)     Alkaline Phosphatase 10/16/2023 121 (H)     Total Protein 10/16/2023 6.5     Albumin 10/16/2023 3.8     Total Bilirubin 10/16/2023 0.41     eGFR 10/16/2023 102     Lipase 10/16/2023 7 (L)     POC Glucose 10/16/2023 93     POC Glucose 10/16/2023 103     POC Glucose 10/16/2023 97     POC Glucose 10/16/2023 199 (H)     Sodium 10/17/2023 140     Potassium 10/17/2023 3.7     Chloride 10/17/2023 104     CO2 10/17/2023 30     ANION GAP 10/17/2023 6     BUN 10/17/2023 7     Creatinine 10/17/2023 0.59 (L)     Glucose 10/17/2023 87     Calcium 10/17/2023 8.9     AST 10/17/2023 43 (H)     ALT 10/17/2023 78 (H)     Alkaline Phosphatase 10/17/2023 150 (H)     Total Protein 10/17/2023 6.5     Albumin 10/17/2023 3.7     Total Bilirubin 10/17/2023 0.35     eGFR 10/17/2023 102     WBC 10/17/2023 7.29     RBC 10/17/2023 4.11     Hemoglobin 10/17/2023 11.9     Hematocrit 10/17/2023 36.6     MCV 10/17/2023 89     MCH 10/17/2023 29.0     MCHC 10/17/2023 32.5     RDW 10/17/2023 12.3     MPV 10/17/2023 9.6     Platelets 53/33/2427 273     nRBC 10/17/2023 0     Neutrophils Relative 10/17/2023 69     Immat GRANS % 10/17/2023 0     Lymphocytes Relative 10/17/2023 21     Monocytes Relative 10/17/2023 9     Eosinophils Relative 10/17/2023 1     Basophils Relative 10/17/2023 0     Neutrophils Absolute 10/17/2023 4.95     Immature Grans Absolute 10/17/2023 0.01     Lymphocytes Absolute 10/17/2023 1.56     Monocytes Absolute 10/17/2023 0.67     Eosinophils Absolute 10/17/2023 0.08     Basophils Absolute 10/17/2023 0.02     POC Glucose 10/17/2023 87        Imaging Studies: I have personally reviewed pertinent imaging studies.

## 2023-10-18 ENCOUNTER — APPOINTMENT (INPATIENT)
Dept: MRI IMAGING | Facility: HOSPITAL | Age: 58
DRG: 074 | End: 2023-10-18
Payer: COMMERCIAL

## 2023-10-18 LAB
ALBUMIN SERPL BCP-MCNC: 3.6 G/DL (ref 3.5–5)
ALP SERPL-CCNC: 119 U/L (ref 34–104)
ALT SERPL W P-5'-P-CCNC: 54 U/L (ref 7–52)
ANION GAP SERPL CALCULATED.3IONS-SCNC: 6 MMOL/L
AST SERPL W P-5'-P-CCNC: 26 U/L (ref 13–39)
BILIRUB SERPL-MCNC: 0.32 MG/DL (ref 0.2–1)
BUN SERPL-MCNC: 6 MG/DL (ref 5–25)
CALCIUM SERPL-MCNC: 9 MG/DL (ref 8.4–10.2)
CHLORIDE SERPL-SCNC: 105 MMOL/L (ref 96–108)
CO2 SERPL-SCNC: 31 MMOL/L (ref 21–32)
CREAT SERPL-MCNC: 0.62 MG/DL (ref 0.6–1.3)
GFR SERPL CREATININE-BSD FRML MDRD: 101 ML/MIN/1.73SQ M
GLUCOSE SERPL-MCNC: 182 MG/DL (ref 65–140)
GLUCOSE SERPL-MCNC: 76 MG/DL (ref 65–140)
GLUCOSE SERPL-MCNC: 76 MG/DL (ref 65–140)
GLUCOSE SERPL-MCNC: 84 MG/DL (ref 65–140)
GLUCOSE SERPL-MCNC: 86 MG/DL (ref 65–140)
POTASSIUM SERPL-SCNC: 3.7 MMOL/L (ref 3.5–5.3)
PROT SERPL-MCNC: 6.2 G/DL (ref 6.4–8.4)
SODIUM SERPL-SCNC: 142 MMOL/L (ref 135–147)

## 2023-10-18 PROCEDURE — 80053 COMPREHEN METABOLIC PANEL: CPT | Performed by: PHYSICIAN ASSISTANT

## 2023-10-18 PROCEDURE — A9585 GADOBUTROL INJECTION: HCPCS | Performed by: NURSE PRACTITIONER

## 2023-10-18 PROCEDURE — 82948 REAGENT STRIP/BLOOD GLUCOSE: CPT

## 2023-10-18 PROCEDURE — 74183 MRI ABD W/O CNTR FLWD CNTR: CPT

## 2023-10-18 PROCEDURE — 99232 SBSQ HOSP IP/OBS MODERATE 35: CPT | Performed by: INTERNAL MEDICINE

## 2023-10-18 PROCEDURE — 99232 SBSQ HOSP IP/OBS MODERATE 35: CPT | Performed by: PHYSICIAN ASSISTANT

## 2023-10-18 RX ORDER — GADOBUTROL 604.72 MG/ML
7 INJECTION INTRAVENOUS
Status: COMPLETED | OUTPATIENT
Start: 2023-10-18 | End: 2023-10-18

## 2023-10-18 RX ORDER — DICYCLOMINE HYDROCHLORIDE 10 MG/1
20 CAPSULE ORAL 3 TIMES DAILY PRN
Status: DISCONTINUED | OUTPATIENT
Start: 2023-10-18 | End: 2023-10-19 | Stop reason: HOSPADM

## 2023-10-18 RX ORDER — KETOROLAC TROMETHAMINE 30 MG/ML
15 INJECTION, SOLUTION INTRAMUSCULAR; INTRAVENOUS EVERY 6 HOURS PRN
Status: DISCONTINUED | OUTPATIENT
Start: 2023-10-18 | End: 2023-10-19 | Stop reason: HOSPADM

## 2023-10-18 RX ORDER — MAGNESIUM HYDROXIDE/ALUMINUM HYDROXICE/SIMETHICONE 120; 1200; 1200 MG/30ML; MG/30ML; MG/30ML
30 SUSPENSION ORAL EVERY 6 HOURS PRN
Status: DISCONTINUED | OUTPATIENT
Start: 2023-10-18 | End: 2023-10-19 | Stop reason: HOSPADM

## 2023-10-18 RX ADMIN — CLONAZEPAM 0.5 MG: 0.5 TABLET ORAL at 20:20

## 2023-10-18 RX ADMIN — ESCITALOPRAM OXALATE 10 MG: 10 TABLET ORAL at 08:10

## 2023-10-18 RX ADMIN — SUCRALFATE 1 G: 1 TABLET ORAL at 13:25

## 2023-10-18 RX ADMIN — METOPROLOL TARTRATE 50 MG: 50 TABLET, FILM COATED ORAL at 08:10

## 2023-10-18 RX ADMIN — MORPHINE SULFATE 2 MG: 2 INJECTION, SOLUTION INTRAMUSCULAR; INTRAVENOUS at 13:30

## 2023-10-18 RX ADMIN — ASPIRIN 81 MG CHEWABLE TABLET 81 MG: 81 TABLET CHEWABLE at 08:10

## 2023-10-18 RX ADMIN — PANTOPRAZOLE SODIUM 40 MG: 40 TABLET, DELAYED RELEASE ORAL at 08:10

## 2023-10-18 RX ADMIN — POLYETHYLENE GLYCOL 3350 17 G: 17 POWDER, FOR SOLUTION ORAL at 08:10

## 2023-10-18 RX ADMIN — INSULIN LISPRO 1 UNITS: 100 INJECTION, SOLUTION INTRAVENOUS; SUBCUTANEOUS at 21:49

## 2023-10-18 RX ADMIN — GADOBUTROL 7 ML: 604.72 INJECTION INTRAVENOUS at 13:05

## 2023-10-18 RX ADMIN — DICYCLOMINE HYDROCHLORIDE 20 MG: 10 CAPSULE ORAL at 08:10

## 2023-10-18 RX ADMIN — ATORVASTATIN CALCIUM 20 MG: 20 TABLET, FILM COATED ORAL at 08:10

## 2023-10-18 RX ADMIN — MORPHINE SULFATE 2 MG: 2 INJECTION, SOLUTION INTRAMUSCULAR; INTRAVENOUS at 20:15

## 2023-10-18 RX ADMIN — GABAPENTIN 400 MG: 400 CAPSULE ORAL at 16:42

## 2023-10-18 RX ADMIN — MORPHINE SULFATE 2 MG: 2 INJECTION, SOLUTION INTRAMUSCULAR; INTRAVENOUS at 04:47

## 2023-10-18 RX ADMIN — GABAPENTIN 400 MG: 400 CAPSULE ORAL at 20:21

## 2023-10-18 RX ADMIN — SUCRALFATE 1 G: 1 TABLET ORAL at 21:48

## 2023-10-18 RX ADMIN — TRAZODONE HYDROCHLORIDE 200 MG: 100 TABLET ORAL at 21:48

## 2023-10-18 RX ADMIN — DOCUSATE SODIUM 100 MG: 100 CAPSULE, LIQUID FILLED ORAL at 08:10

## 2023-10-18 RX ADMIN — METOCLOPRAMIDE 10 MG: 10 TABLET ORAL at 08:10

## 2023-10-18 RX ADMIN — SUCRALFATE 1 G: 1 TABLET ORAL at 06:26

## 2023-10-18 RX ADMIN — METOPROLOL TARTRATE 50 MG: 50 TABLET, FILM COATED ORAL at 21:38

## 2023-10-18 RX ADMIN — INSULIN GLARGINE 25 UNITS: 100 INJECTION, SOLUTION SUBCUTANEOUS at 21:48

## 2023-10-18 RX ADMIN — GABAPENTIN 400 MG: 400 CAPSULE ORAL at 08:10

## 2023-10-18 RX ADMIN — DOCUSATE SODIUM 100 MG: 100 CAPSULE, LIQUID FILLED ORAL at 18:02

## 2023-10-18 RX ADMIN — LEVOTHYROXINE SODIUM 25 MCG: 25 TABLET ORAL at 06:26

## 2023-10-18 RX ADMIN — PANTOPRAZOLE SODIUM 40 MG: 40 TABLET, DELAYED RELEASE ORAL at 20:21

## 2023-10-18 RX ADMIN — SODIUM CHLORIDE, SODIUM LACTATE, POTASSIUM CHLORIDE, AND CALCIUM CHLORIDE 75 ML/HR: .6; .31; .03; .02 INJECTION, SOLUTION INTRAVENOUS at 04:47

## 2023-10-18 RX ADMIN — NICOTINE 1 PATCH: 14 PATCH, EXTENDED RELEASE TRANSDERMAL at 08:10

## 2023-10-18 RX ADMIN — DICYCLOMINE HYDROCHLORIDE 20 MG: 10 CAPSULE ORAL at 16:43

## 2023-10-18 RX ADMIN — METOCLOPRAMIDE 10 MG: 10 TABLET ORAL at 18:03

## 2023-10-18 RX ADMIN — ARIPIPRAZOLE 10 MG: 5 TABLET ORAL at 08:10

## 2023-10-18 RX ADMIN — KETOROLAC TROMETHAMINE 15 MG: 30 INJECTION, SOLUTION INTRAMUSCULAR; INTRAVENOUS at 18:16

## 2023-10-18 RX ADMIN — MORPHINE SULFATE 2 MG: 2 INJECTION, SOLUTION INTRAMUSCULAR; INTRAVENOUS at 08:56

## 2023-10-18 RX ADMIN — SENNOSIDES 8.6 MG: 8.6 TABLET, FILM COATED ORAL at 08:10

## 2023-10-18 RX ADMIN — SUCRALFATE 1 G: 1 TABLET ORAL at 16:42

## 2023-10-18 RX ADMIN — MORPHINE SULFATE 2 MG: 2 INJECTION, SOLUTION INTRAMUSCULAR; INTRAVENOUS at 00:39

## 2023-10-18 NOTE — PROGRESS NOTES
Progress Note- Edwin Byrne 66 y.o. female MRN: [id number]    Unit/Bed#: -01 Encounter: [id number]      Assessment and Plan:    66 y.o. female with history of bipolar disorder, substance abuse, hypothyroidism, T2DM, HLD, HTN, cholecystectomy, gastroparesis with recent EGD botox 10/10 who presented 10/15 due to upper abdominal pain, reflux, and nausea. CBC, CMP, Lipase, CT abdomen pelvis unremarkable. #1 Upper abdominal pain   #2 Elevated LFTs  Epigastric/RUQ pain likely multifactorial secondary to diabetic gastroparesis, GERD, possible bile reflux, ?biliary w/ new elevation LFTs. Discussed with patient that it could take weeks for Botox to take effect after intra pyloric Botox injection. She was previously on Ozempic which was stopped. LFTs mildly day after admission, now trending down. RUQ US done showed CBD 8mm but this was felt to be due to post cholecystectomy state, she otherwise had evidence of a 1.3 suspected hemangioma and enlarged 21.6cm liver secondary to Reidel's lobe, liver appeared smooth in contour. She remains afebrile with no leukocytosis. Denies any N/V or HB, but pain persists requiring the use of IV morphine.      -Obtain MRI/MRCP for further evaluation of RUQ pain with elevated LFTs and CBD dilation. Spoke to radiology and this was planned for 10 am but per nursing was moved back to noon. Will keep NPO past in case further intervention with ERCP needed  -Continue Reglan twice daily  -Continue PPI twice daily, Carafate QID  -Tight glycemic control  -Avoid medications which slow GI motility such as narcotics, anticholinergics, etc.  -Continue supportive care with IVF, repletion of electrolytes as tolerated  -Monitor CBC, CMP, abdominal exam     #4 Constipation  -Continue bowel regimen with MiraLAX    ______________________________________________________________________    Subjective:     Denies N/V. Reflux is improving.  Abdominal pain is the same in epigastric/RUQ area to back and some in umbilical area as well today. She had a BM this morning and is passing gas. MRI moved back to noon per RN. She is ambulating around the unit comfortably.     Medication Administration - last 24 hours from 10/17/2023 1230 to 10/18/2023 1230         Date/Time Order Dose Route Action Action by     10/18/2023 0447 EDT lactated ringers infusion 75 mL/hr Intravenous [address] [place]     10/18/2023 2407 EDT docusate sodium (COLACE) capsule 100 mg 100 mg Oral Given Damion Smith, RN     10/17/2023 1708 EDT docusate sodium (COLACE) capsule 100 mg 100 mg Oral Given Vinh Nevarez, RN     10/18/2023 4997 EDT senna (SENOKOT) tablet 8.6 mg 8.6 mg Oral Given Damion Smith, RN     10/18/2023 0810 EDT polyethylene glycol (MIRALAX) packet 17 g 17 g Oral Given Damion Smith, RN     10/18/2023 0810 EDT nicotine (NICODERM CQ) 14 mg/24hr TD 24 hr patch 1 patch 1 patch Transdermal Medication Applied Damion Smith, RN     10/17/2023 2150 EDT aluminum-magnesium hydroxide-simethicone (MAALOX) oral suspension 30 mL 30 mL Oral Given Panther Mingle, RN     10/18/2023 0810 EDT dicyclomine (BENTYL) capsule 20 mg 20 mg Oral Given Damion Smith, RN     10/17/2023 2151 EDT dicyclomine (BENTYL) capsule 20 mg 20 mg Oral Given Panther Mingle, RN     10/17/2023 1708 EDT dicyclomine (BENTYL) capsule 20 mg 20 mg Oral Given Vinh Nevarez, RN     10/18/2023 4105 EDT morphine injection 2 mg 2 mg Intravenous Given Damion Smith, RN     10/18/2023 0447 EDT morphine injection 2 mg 2 mg Intravenous Given Yancy Heimlich, RN     10/18/2023 0039 EDT morphine injection 2 mg 2 mg Intravenous Given Yancy Heimlich, RN     10/17/2023 1940 EDT morphine injection 2 mg 2 mg Intravenous Given Panther Mingle, RN     10/17/2023 1432 EDT morphine injection 2 mg 2 mg Intravenous Given Vinh Nevarez RN     10/18/2023 0810 EDT ARIPiprazole (ABILIFY) tablet 10 mg 10 mg Oral Given Damion Smith RN     10/18/2023 0810 EDT aspirin chewable tablet 81 mg 81 mg Oral Given Kenneth Ordonez, RN     10/18/2023 1545 EDT atorvastatin (LIPITOR) tablet 20 mg 20 mg Oral Given Kenneth Ordonez, RN     10/17/2023 2151 EDT clonazePAM (KlonoPIN) tablet 0.5 mg 0.5 mg Oral Given Josh Sotelo, RN     10/18/2023 0810 EDT escitalopram (LEXAPRO) tablet 10 mg 10 mg Oral Given Kenneth Ordonez, RN     10/18/2023 0810 EDT gabapentin (NEURONTIN) capsule 400 mg 400 mg Oral Given Kenneth Ordonez, RN     10/17/2023 2151 EDT gabapentin (NEURONTIN) capsule 400 mg 400 mg Oral Given Josh Sotelo, RN     10/17/2023 1708 EDT gabapentin (NEURONTIN) capsule 400 mg 400 mg Oral Given Vesna Awad, RN     10/17/2023 2219 EDT insulin glargine (LANTUS) subcutaneous injection 25 Units 0.25 mL 25 Units Subcutaneous Not Given Josh Sotelo, RN     10/18/2023 3508 EDT levothyroxine tablet 25 mcg 25 mcg Oral Given Jose Alberto Montes, RN     10/18/2023 7399 EDT metoprolol tartrate (LOPRESSOR) tablet 50 mg 50 mg Oral Given Kenneth Ordonez, RN     10/17/2023 2151 EDT metoprolol tartrate (LOPRESSOR) tablet 50 mg 50 mg Oral Given Josh Sotelo, RN     10/18/2023 0810 EDT metoclopramide (REGLAN) tablet 10 mg 10 mg Oral Given Kenneth Ordonez, RN     10/17/2023 1710 EDT metoclopramide (REGLAN) tablet 10 mg 10 mg Oral Given Vesna Awad, RN     10/17/2023 2151 EDT traZODone (DESYREL) tablet 200 mg 200 mg Oral Given Josh Sotelo, RN     10/18/2023 1156 EDT insulin lispro (HumaLOG) 100 units/mL subcutaneous injection 1-5 Units -- Subcutaneous Not Given Kenneth Ordonez, RN     10/18/2023 7842 EDT insulin lispro (HumaLOG) 100 units/mL subcutaneous injection 1-5 Units -- Subcutaneous Not Given Kenneth Ordonez, RN     10/17/2023 1702 EDT insulin lispro (HumaLOG) 100 units/mL subcutaneous injection 1-5 Units -- Subcutaneous Not Given Vesna Awad RN     10/17/2023 2151 EDT insulin lispro (HumaLOG) 100 units/mL subcutaneous injection 1-5 Units -- Subcutaneous Not Given Lucinda Love, MARK ANTHONY     10/18/2023 3788 EDT sucralfate (CARAFATE) tablet 1 g 1 g Oral Given Lior Levy, MARK ANTHONY     10/17/2023 2151 EDT sucralfate (CARAFATE) tablet 1 g 1 g Oral Given Lucinda Love, MARK ANTHONY     10/17/2023 1708 EDT sucralfate (CARAFATE) tablet 1 g 1 g Oral Given Harman Ryan, MARK ANTHONY     10/18/2023 8879 EDT pantoprazole (PROTONIX) EC tablet 40 mg 40 mg Oral Given Chris Kennedy, MARK ANTHONY     10/17/2023 2151 EDT pantoprazole (PROTONIX) EC tablet 40 mg 40 mg Oral Given Lucinda Love RN            Objective:     Vitals: Blood pressure 158/73, pulse (!) 54, temperature 98.1 °F (36.7 °C), temperature source Oral, resp. rate 16, height 5' 3" (1.6 m), weight 72.1 kg (158 lb 14.4 oz), SpO2 97 %, not currently breastfeeding. ,Body mass index is 28.15 kg/m².     No intake or output data in the 24 hours ending 10/18/23 1230    Physical Exam:   General Appearance: Awake and alert, in no acute distress  Abdomen: Soft, non-tender, non-distended; bowel sounds normal; no masses or no organomegaly    Invasive Devices       Peripheral Intravenous Line  Duration             Peripheral IV 10/17/23 Left;Ventral (anterior) Forearm 1 day                    Lab Results:  Admission on 10/15/2023   Component Date Value    WBC 10/15/2023 10.07     RBC 10/15/2023 4.60     Hemoglobin 10/15/2023 13.3     Hematocrit 10/15/2023 40.0     MCV 10/15/2023 87     MCH 10/15/2023 28.9     MCHC 10/15/2023 33.3     RDW 10/15/2023 12.6     MPV 10/15/2023 9.6     Platelets 32/61/1624 377     nRBC 10/15/2023 0     Neutrophils Relative 10/15/2023 61     Immat GRANS % 10/15/2023 0     Lymphocytes Relative 10/15/2023 31     Monocytes Relative 10/15/2023 7     Eosinophils Relative 10/15/2023 1     Basophils Relative 10/15/2023 0     Neutrophils Absolute 10/15/2023 6.10     Immature Grans Absolute 10/15/2023 0.03     Lymphocytes Absolute 10/15/2023 3.10     Monocytes Absolute 10/15/2023 0.71     Eosinophils Absolute 10/15/2023 0.10     Basophils Absolute 10/15/2023 0.03     Sodium 10/15/2023 138     Potassium 10/15/2023 4.0     Chloride 10/15/2023 101     CO2 10/15/2023 28     ANION GAP 10/15/2023 9     BUN 10/15/2023 12     Creatinine 10/15/2023 0.69     Glucose 10/15/2023 111     Calcium 10/15/2023 9.8     AST 10/15/2023 18     ALT 10/15/2023 14     Alkaline Phosphatase 10/15/2023 77     Total Protein 10/15/2023 7.9     Albumin 10/15/2023 4.6     Total Bilirubin 10/15/2023 0.45     eGFR 10/15/2023 97     Lipase 10/15/2023 13     hs TnI 0hr 10/15/2023 3     Ventricular Rate 10/15/2023 67     Atrial Rate 10/15/2023 67     OH Interval 10/15/2023 214     QRSD Interval 10/15/2023 66     QT Interval 10/15/2023 416     QTC Interval 10/15/2023 439     P Axis 10/15/2023 49     QRS Axis 10/15/2023 5     T Wave [place] 10/15/2023 32     POC Glucose 10/15/2023 78     POC Glucose 10/15/2023 83     Magnesium 10/16/2023 2.3     Sodium 10/16/2023 137     Potassium 10/16/2023 4.3     Chloride 10/16/2023 104     CO2 10/16/2023 27     ANION GAP 10/16/2023 6     BUN 10/16/2023 11     Creatinine 10/16/2023 0.59 (L)     Glucose 10/16/2023 94     Glucose, Fasting 10/16/2023 94     Calcium 10/16/2023 9.1     AST 10/16/2023 76 (H)     ALT 10/16/2023 86 (H)     Alkaline Phosphatase 10/16/2023 121 (H)     Total Protein 10/16/2023 6.5     Albumin 10/16/2023 3.8     Total Bilirubin 10/16/2023 0.41     eGFR 10/16/2023 102     Lipase 10/16/2023 7 (L)     POC Glucose 10/16/2023 93     POC Glucose 10/16/2023 103     POC Glucose 10/16/2023 97     POC Glucose 10/16/2023 199 (H)     Sodium 10/17/2023 140     Potassium 10/17/2023 3.7     Chloride 10/17/2023 104     CO2 10/17/2023 30     ANION GAP 10/17/2023 6     BUN 10/17/2023 7     Creatinine 10/17/2023 0.59 (L)     Glucose 10/17/2023 87     Calcium 10/17/2023 8.9     AST 10/17/2023 43 (H)     ALT 10/17/2023 78 (H)     Alkaline Phosphatase 10/17/2023 150 (H)     Total Protein 10/17/2023 6.5     Albumin 10/17/2023 3.7     Total Bilirubin 10/17/2023 0.35     eGFR 10/17/2023 102     WBC 10/17/2023 7.29     RBC 10/17/2023 4.11     Hemoglobin 10/17/2023 11.9     Hematocrit 10/17/2023 36.6     MCV 10/17/2023 89     MCH 10/17/2023 29.0     MCHC 10/17/2023 32.5     RDW 10/17/2023 12.3     MPV 10/17/2023 9.6     Platelets 94/52/4503 273     nRBC 10/17/2023 0     Neutrophils Relative 10/17/2023 69     Immat GRANS % 10/17/2023 0     Lymphocytes Relative 10/17/2023 21     Monocytes Relative 10/17/2023 9     Eosinophils Relative 10/17/2023 1     Basophils Relative 10/17/2023 0     Neutrophils Absolute 10/17/2023 4.95     Immature Grans Absolute 10/17/2023 0.01     Lymphocytes Absolute 10/17/2023 1.56     Monocytes Absolute 10/17/2023 0.67     Eosinophils Absolute 10/17/2023 0.08     Basophils Absolute 10/17/2023 0.02     POC Glucose 10/17/2023 87     POC Glucose 10/17/2023 107     POC Glucose 10/17/2023 115     POC Glucose 10/17/2023 84     Sodium 10/18/2023 142     Potassium 10/18/2023 3.7     Chloride 10/18/2023 105     CO2 10/18/2023 31     ANION GAP 10/18/2023 6     BUN 10/18/2023 6     Creatinine 10/18/2023 0.62     Glucose 10/18/2023 76     Calcium 10/18/2023 9.0     AST 10/18/2023 26     ALT 10/18/2023 54 (H)     Alkaline Phosphatase 10/18/2023 119 (H)     Total Protein 10/18/2023 6.2 (L)     Albumin 10/18/2023 3.6     Total Bilirubin 10/18/2023 0.32     eGFR 10/18/2023 101     POC Glucose 10/18/2023 84     POC Glucose 10/18/2023 76        Imaging Studies: I have personally reviewed pertinent imaging studies.

## 2023-10-18 NOTE — PLAN OF CARE
Problem: METABOLIC, FLUID AND ELECTROLYTES - ADULT  Goal: Fluid balance maintained  Description: INTERVENTIONS:  - Monitor labs   - Monitor I/O and WT  - Instruct patient on fluid and nutrition as appropriate  - Assess for signs & symptoms of volume excess or deficit  Outcome: Progressing     Problem: SAFETY ADULT  Goal: Maintain or return to baseline ADL function  Description: INTERVENTIONS:  -  Assess patient's ability to carry out ADLs; assess patient's baseline for ADL function and identify physical deficits which impact ability to perform ADLs (bathing, care of mouth/teeth, toileting, grooming, dressing, etc.)  - Assess/evaluate cause of self-care deficits   - Assess range of motion  - Assess patient's mobility; develop plan if impaired  - Assess patient's need for assistive devices and provide as appropriate  - Encourage maximum independence but intervene and supervise when necessary  - Involve family in performance of ADLs  - Assess for home care needs following discharge   - Consider OT consult to assist with ADL evaluation and planning for discharge  - Provide patient education as appropriate  Outcome: Progressing     Problem: PAIN - ADULT  Goal: Verbalizes/displays adequate comfort level or baseline comfort level  Description: Interventions:  - Encourage patient to monitor pain and request assistance  - Assess pain using appropriate pain scale  - Administer analgesics based on type and severity of pain and evaluate response  - Implement non-pharmacological measures as appropriate and evaluate response  - Consider cultural and social influences on pain and pain management  - Notify physician/advanced practitioner if interventions unsuccessful or patient reports new pain  Outcome: Progressing

## 2023-10-18 NOTE — PROGRESS NOTES
[address]  Progress Note  Name: Preeti Garcias  MRN: [id number]  Unit/Bed#: -01 I Date of Admission: 10/15/2023   Date of Service: 10/18/2023 I Hospital Day: 2    Assessment/Plan   * Abdominal pain  Assessment & Plan  Presented with intractable RUQ and epigastric abdominal pain with reflux-like symptoms. Hx gastroparesis, recent EGD with botox on 10/10. Likely in setting of gastroparesis, GERD, ? biliary source  CT a/p w/ contrast shows no acute abdominal pelvic pathology  RUQ US with no acute pathologies, known liver lesions compatible with hepatic hemangiomas  Mild LFT elevation since 10/16, although nl lipase  GI following, pending MRI/MRCP results, may consider further interventions with ERCP  Continue PPI BID, Reglan BID, Carafate QID  Avoid opiates, bowel regimen    Diabetes mellitus type 2 in obese   Assessment & Plan  Lab Results   Component Value Date    HGBA1C 5.5 09/03/2023       Recent Labs     10/17/23  1459 10/17/23  2054 10/18/23  0715 10/18/23  1125   POCGLU 115 84 84 76         Blood Sugar Average: Last 72 hrs:  (P) 100.5    Home regimen: Lantus 25U qHS and metformin. Hold home oral medications while admitted  Continue home Lantus, SSI coverage while inpatient  Hypoglycemia protocol, diabetic diet      Tobacco abuse  Assessment & Plan  Educated on smoking cessation  NRT    Acquired hypothyroidism  Assessment & Plan  Continue levothyroxine    Bipolar 1 disorder, depressed (HCC)  Assessment & Plan  Continue Lexapro, aripiprazole and trazodone  Mood currently stable    Essential hypertension  Assessment & Plan  BP reviewed and fluctuating  Continue metoprolol             VTE Pharmacologic Prophylaxis: VTE Score: 4 Moderate Risk (Score 3-4) - Pharmacological DVT Prophylaxis Ordered: enoxaparin (Lovenox). Patient Centered Rounds: I performed bedside rounds with nursing staff today.   Discussions with Specialists or Other Care Team Provider: GI, case management    Education and Discussions with Family / Patient: Patient declined call to . Total Time Spent on Date of Encounter in care of patient: 35 mins. This time was spent on one or more of the following: performing physical exam; counseling and coordination of care; obtaining or reviewing history; documenting in the medical record; reviewing/ordering tests, medications or procedures; communicating with other healthcare professionals and discussing with patient's family/caregivers. Current Length of Stay: 2 day(s)  Current Patient Status: Inpatient   Certification Statement: The patient will continue to require additional inpatient hospital stay due to MRCP results, clinical improvement  Discharge Plan: Anticipate discharge in 24-48 hrs to home. Code Status: Level 1 - Full Code    Subjective:   Patient is seen at bedside this a.m., ambulating in hallways. Persistent severe epigastric and RUQ abdominal pain, nausea but no vomiting and intermittent small loose BMs but no significant improvement from yesterday. Objective:     Vitals:   Temp (24hrs), Av °F (36.7 °C), Min:97.7 °F (36.5 °C), Max:98.1 °F (36.7 °C)    Temp:  [97.7 °F (36.5 °C)-98.1 °F (36.7 °C)] 98.1 °F (36.7 °C)  HR:  [52-57] 54  Resp:  [16-20] 16  BP: (138-158)/(59-74) 158/73  SpO2:  [97 %-98 %] 97 %  Body mass index is 28.15 kg/m². Input and Output Summary (last 24 hours):   No intake or output data in the 24 hours ending 10/18/23 1409    Physical Exam:   Physical Exam  Constitutional:       General: She is not in acute distress. Appearance: She is not ill-appearing, toxic-appearing or diaphoretic. Cardiovascular:      Rate and Rhythm: Normal rate and regular rhythm. Pulses: Normal pulses. Heart sounds: Normal heart sounds. Pulmonary:      Effort: Pulmonary effort is normal. No respiratory distress. Breath sounds: Normal breath sounds.    Abdominal:      General: Bowel sounds are normal. There is distension. Palpations: Abdomen is soft. Tenderness: There is abdominal tenderness. There is no guarding. Comments: Mild abdominal distention, severe tenderness to palpation of epigastric and RUQ region. Musculoskeletal:         General: No swelling or tenderness. Skin:     General: Skin is warm. Neurological:      General: No focal deficit present. Mental Status: She is alert. Psychiatric:         Mood and Affect: Mood normal.         Behavior: Behavior normal.          Additional Data:     Labs:  Results from last 7 days   Lab Units 10/17/23  0512   WBC Thousand/uL 7.29   HEMOGLOBIN g/dL 11.9   HEMATOCRIT % 36.6   PLATELETS Thousands/uL 273   NEUTROS PCT % 69   LYMPHS PCT % 21   MONOS PCT % 9   EOS PCT % 1     Results from last 7 days   Lab Units 10/18/23  0447   SODIUM mmol/L 142   POTASSIUM mmol/L 3.7   CHLORIDE mmol/L 105   CO2 mmol/L 31   BUN mg/dL 6   CREATININE mg/dL 0.62   ANION GAP mmol/L 6   CALCIUM mg/dL 9.0   ALBUMIN g/dL 3.6   TOTAL BILIRUBIN mg/dL 0.32   ALK PHOS U/L 119*   ALT U/L 54*   AST U/L 26   GLUCOSE RANDOM mg/dL 76         Results from last 7 days   Lab Units 10/18/23  1125 10/18/23  0715 10/17/23  2054 10/17/23  1459 10/17/23  1120 10/17/23  0712 10/16/23  2106 10/16/23  1644 10/16/23  1059 10/16/23  0717 10/15/23  2129 10/15/23  1653   POC GLUCOSE mg/dl 76 84 84 115 107 87 199* 97 103 93 83 78               Lines/Drains:  Invasive Devices       Peripheral Intravenous Line  Duration             Peripheral IV 10/17/23 Left;Ventral (anterior) Forearm 1 day                          Imaging: No pertinent imaging reviewed.     Recent Cultures (last 7 days):         Last 24 Hours Medication List:   Current Facility-Administered Medications   Medication Dose Route Frequency Provider Last Rate    aluminum-magnesium hydroxide-simethicone  30 mL Oral Q6H PRN Maris Vega MD      ARIPiprazole  10 mg Oral Daily Maris Vega MD      aspirin  81 mg Oral Daily Maris Mata MD      atorvastatin  20 mg Oral Daily Kelly Samuels MD      clonazePAM  0.5 mg Oral Daily PRN Kelly Samuels MD      dicyclomine  20 mg Oral TID Kelly Samuels MD      docusate sodium  100 mg Oral BID Kelly Samuels MD      [START ON 10/19/2023] enoxaparin  40 mg Subcutaneous Daily YONG Nugent      escitalopram  10 mg Oral Daily Maris Mata MD      gabapentin  400 mg Oral TID Kelly Samuels MD      insulin glargine  25 Units Subcutaneous HS Maris Mata MD      insulin lispro  1-5 Units Subcutaneous TID AC Maris Mata MD      insulin lispro  1-5 Units Subcutaneous HS Maris Mata MD      lactated ringers  75 mL/hr Intravenous Continuous Kelly Samuels MD 75 mL/hr (10/18/23 0447)    levothyroxine  25 mcg Oral Early Morning Kelly Samuels MD      metoclopramide  10 mg Oral BID Kelly Samuels MD      metoprolol tartrate  50 mg Oral Q12H [address] Maris Mata MD      morphine injection  2 mg Intravenous Q4H PRN Kelly Samuels MD      nicotine  1 patch Transdermal Daily Maris Mata MD      ondansetron  4 mg Intravenous Q6H PRN Kelly Samuels MD      pantoprazole  40 mg Oral Q12H [address] YONG Nugent      polyethylene glycol  17 g Oral Daily Maris Mata MD      senna  1 tablet Oral Daily Maris Mata MD      sucralfate  1 g Oral 4x Daily (AC & HS) YONG Nugent      traZODone  200 mg Oral HS Kelly Samuels MD          Today, Patient Was Seen By: Yaron Irwin PA-C    **Please Note: This note may have been constructed using a voice recognition system. **

## 2023-10-18 NOTE — ASSESSMENT & PLAN NOTE
Lab Results   Component Value Date    HGBA1C 5.5 09/03/2023       Recent Labs     10/17/23  1459 10/17/23  2054 10/18/23  0715 10/18/23  1125   POCGLU 115 84 84 76         Blood Sugar Average: Last 72 hrs:  (P) 100.5    Home regimen: Lantus 25U qHS and metformin.    Hold home oral medications while admitted  Continue home Lantus, SSI coverage while inpatient  Hypoglycemia protocol, diabetic diet

## 2023-10-18 NOTE — PLAN OF CARE
Problem: PAIN - ADULT  Goal: Verbalizes/displays adequate comfort level or baseline comfort level  Description: Interventions:  - Encourage patient to monitor pain and request assistance  - Assess pain using appropriate pain scale  - Administer analgesics based on type and severity of pain and evaluate response  - Implement non-pharmacological measures as appropriate and evaluate response  - Consider cultural and social influences on pain and pain management  - Notify physician/advanced practitioner if interventions unsuccessful or patient reports new pain  Outcome: Progressing     Problem: SAFETY ADULT  Goal: Patient will remain free of falls  Description: INTERVENTIONS:  - Educate patient/family on patient safety including physical limitations  - Instruct patient to call for assistance with activity   - Consult OT/PT to assist with strengthening/mobility   - Keep Call bell within reach  - Keep bed low and locked with side rails adjusted as appropriate  - Keep care items and personal belongings within reach  - Initiate and maintain comfort rounds  - Apply yellow socks and bracelet for high fall risk patients  - Consider moving patient to room near nurses station  Outcome: Progressing         Problem: GASTROINTESTINAL - ADULT  Goal: Minimal or absence of nausea and/or vomiting  Description: INTERVENTIONS:  - Administer IV fluids if ordered to ensure adequate hydration  - Maintain NPO status until nausea and vomiting are resolved  - Nasogastric tube if ordered  - Administer ordered antiemetic medications as needed  - Provide nonpharmacologic comfort measures as appropriate  - Advance diet as tolerated, if ordered  - Consider nutrition services referral to assist patient with adequate nutrition and appropriate food choices  Outcome: Progressing     Problem: GASTROINTESTINAL - ADULT  Goal: Maintains adequate nutritional intake  Description: INTERVENTIONS:  - Monitor percentage of each meal consumed  - Identify factors contributing to decreased intake, treat as appropriate  - Assist with meals as needed  - Monitor I&O, weight, and lab values if indicated  - Obtain nutrition services referral as needed  Outcome: Progressing        Problem: METABOLIC, FLUID AND ELECTROLYTES - ADULT  Goal: Electrolytes maintained within normal limits  Description: INTERVENTIONS:  - Monitor labs and assess patient for signs and symptoms of electrolyte imbalances  - Administer electrolyte replacement as ordered  - Monitor response to electrolyte replacements, including repeat lab results as appropriate  - Instruct patient on fluid and nutrition as appropriate  Outcome: Progressing

## 2023-10-18 NOTE — ASSESSMENT & PLAN NOTE
Presented with intractable RUQ and epigastric abdominal pain with reflux-like symptoms. Hx gastroparesis, recent EGD with botox on 10/10.    Likely in setting of gastroparesis, GERD, ? biliary source  CT a/p w/ contrast shows no acute abdominal pelvic pathology  RUQ US with no acute pathologies, known liver lesions compatible with hepatic hemangiomas  Mild LFT elevation since 10/16, although nl lipase  GI following, pending MRI/MRCP results, may consider further interventions with ERCP  Continue PPI BID, Reglan BID, Carafate QID  Avoid opiates, bowel regimen

## 2023-10-19 VITALS
TEMPERATURE: 98.2 F | BODY MASS INDEX: 28.16 KG/M2 | WEIGHT: 158.9 LBS | RESPIRATION RATE: 20 BRPM | HEIGHT: 63 IN | DIASTOLIC BLOOD PRESSURE: 71 MMHG | SYSTOLIC BLOOD PRESSURE: 156 MMHG | HEART RATE: 57 BPM | OXYGEN SATURATION: 99 %

## 2023-10-19 LAB
ALBUMIN SERPL BCP-MCNC: 3.5 G/DL (ref 3.5–5)
ALP SERPL-CCNC: 113 U/L (ref 34–104)
ALT SERPL W P-5'-P-CCNC: 45 U/L (ref 7–52)
ANION GAP SERPL CALCULATED.3IONS-SCNC: 6 MMOL/L
AST SERPL W P-5'-P-CCNC: 19 U/L (ref 13–39)
BASOPHILS # BLD AUTO: 0.02 THOUSANDS/ÂΜL (ref 0–0.1)
BASOPHILS NFR BLD AUTO: 0 % (ref 0–1)
BILIRUB SERPL-MCNC: 0.37 MG/DL (ref 0.2–1)
BUN SERPL-MCNC: 13 MG/DL (ref 5–25)
CALCIUM SERPL-MCNC: 9.1 MG/DL (ref 8.4–10.2)
CHLORIDE SERPL-SCNC: 106 MMOL/L (ref 96–108)
CO2 SERPL-SCNC: 31 MMOL/L (ref 21–32)
CREAT SERPL-MCNC: 0.7 MG/DL (ref 0.6–1.3)
EOSINOPHIL # BLD AUTO: 0.12 THOUSAND/ÂΜL (ref 0–0.61)
EOSINOPHIL NFR BLD AUTO: 2 % (ref 0–6)
ERYTHROCYTE [DISTWIDTH] IN BLOOD BY AUTOMATED COUNT: 12.5 % (ref 11.6–15.1)
GFR SERPL CREATININE-BSD FRML MDRD: 97 ML/MIN/1.73SQ M
GLUCOSE SERPL-MCNC: 114 MG/DL (ref 65–140)
GLUCOSE SERPL-MCNC: 71 MG/DL (ref 65–140)
GLUCOSE SERPL-MCNC: 82 MG/DL (ref 65–140)
HCT VFR BLD AUTO: 36.3 % (ref 34.8–46.1)
HGB BLD-MCNC: 11.5 G/DL (ref 11.5–15.4)
IMM GRANULOCYTES # BLD AUTO: 0.02 THOUSAND/UL (ref 0–0.2)
IMM GRANULOCYTES NFR BLD AUTO: 0 % (ref 0–2)
INR PPP: 0.94 (ref 0.84–1.19)
LYMPHOCYTES # BLD AUTO: 2.07 THOUSANDS/ÂΜL (ref 0.6–4.47)
LYMPHOCYTES NFR BLD AUTO: 32 % (ref 14–44)
MCH RBC QN AUTO: 27.9 PG (ref 26.8–34.3)
MCHC RBC AUTO-ENTMCNC: 31.7 G/DL (ref 31.4–37.4)
MCV RBC AUTO: 88 FL (ref 82–98)
MONOCYTES # BLD AUTO: 0.57 THOUSAND/ÂΜL (ref 0.17–1.22)
MONOCYTES NFR BLD AUTO: 9 % (ref 4–12)
NEUTROPHILS # BLD AUTO: 3.66 THOUSANDS/ÂΜL (ref 1.85–7.62)
NEUTS SEG NFR BLD AUTO: 57 % (ref 43–75)
NRBC BLD AUTO-RTO: 0 /100 WBCS
PLATELET # BLD AUTO: 282 THOUSANDS/UL (ref 149–390)
PMV BLD AUTO: 9.9 FL (ref 8.9–12.7)
POTASSIUM SERPL-SCNC: 3.7 MMOL/L (ref 3.5–5.3)
PROT SERPL-MCNC: 6.1 G/DL (ref 6.4–8.4)
PROTHROMBIN TIME: 12.8 SECONDS (ref 11.6–14.5)
RBC # BLD AUTO: 4.12 MILLION/UL (ref 3.81–5.12)
SODIUM SERPL-SCNC: 143 MMOL/L (ref 135–147)
WBC # BLD AUTO: 6.46 THOUSAND/UL (ref 4.31–10.16)

## 2023-10-19 PROCEDURE — 85025 COMPLETE CBC W/AUTO DIFF WBC: CPT | Performed by: PHYSICIAN ASSISTANT

## 2023-10-19 PROCEDURE — 82948 REAGENT STRIP/BLOOD GLUCOSE: CPT

## 2023-10-19 PROCEDURE — 85610 PROTHROMBIN TIME: CPT | Performed by: NURSE PRACTITIONER

## 2023-10-19 PROCEDURE — 99232 SBSQ HOSP IP/OBS MODERATE 35: CPT | Performed by: INTERNAL MEDICINE

## 2023-10-19 PROCEDURE — 99239 HOSP IP/OBS DSCHRG MGMT >30: CPT

## 2023-10-19 PROCEDURE — 80053 COMPREHEN METABOLIC PANEL: CPT | Performed by: PHYSICIAN ASSISTANT

## 2023-10-19 RX ORDER — SENNOSIDES 8.6 MG
8.6 TABLET ORAL DAILY
Qty: 30 TABLET | Refills: 0 | Status: SHIPPED | OUTPATIENT
Start: 2023-10-20

## 2023-10-19 RX ORDER — DICYCLOMINE HYDROCHLORIDE 10 MG/1
20 CAPSULE ORAL 3 TIMES DAILY PRN
Qty: 20 CAPSULE | Refills: 0 | Status: SHIPPED | OUTPATIENT
Start: 2023-10-19

## 2023-10-19 RX ADMIN — SUCRALFATE 1 G: 1 TABLET ORAL at 06:16

## 2023-10-19 RX ADMIN — ATORVASTATIN CALCIUM 20 MG: 20 TABLET, FILM COATED ORAL at 09:21

## 2023-10-19 RX ADMIN — KETOROLAC TROMETHAMINE 15 MG: 30 INJECTION, SOLUTION INTRAMUSCULAR; INTRAVENOUS at 01:45

## 2023-10-19 RX ADMIN — ENOXAPARIN SODIUM 40 MG: 40 INJECTION SUBCUTANEOUS at 09:21

## 2023-10-19 RX ADMIN — ESCITALOPRAM OXALATE 10 MG: 10 TABLET ORAL at 09:21

## 2023-10-19 RX ADMIN — DOCUSATE SODIUM 100 MG: 100 CAPSULE, LIQUID FILLED ORAL at 09:21

## 2023-10-19 RX ADMIN — SODIUM CHLORIDE, SODIUM LACTATE, POTASSIUM CHLORIDE, AND CALCIUM CHLORIDE 75 ML/HR: .6; .31; .03; .02 INJECTION, SOLUTION INTRAVENOUS at 01:39

## 2023-10-19 RX ADMIN — POLYETHYLENE GLYCOL 3350 17 G: 17 POWDER, FOR SOLUTION ORAL at 09:21

## 2023-10-19 RX ADMIN — NICOTINE 1 PATCH: 14 PATCH, EXTENDED RELEASE TRANSDERMAL at 09:21

## 2023-10-19 RX ADMIN — GABAPENTIN 400 MG: 400 CAPSULE ORAL at 09:21

## 2023-10-19 RX ADMIN — LEVOTHYROXINE SODIUM 25 MCG: 25 TABLET ORAL at 06:16

## 2023-10-19 RX ADMIN — SUCRALFATE 1 G: 1 TABLET ORAL at 11:40

## 2023-10-19 RX ADMIN — PANTOPRAZOLE SODIUM 40 MG: 40 TABLET, DELAYED RELEASE ORAL at 09:22

## 2023-10-19 RX ADMIN — GABAPENTIN 400 MG: 400 CAPSULE ORAL at 15:53

## 2023-10-19 RX ADMIN — ARIPIPRAZOLE 10 MG: 5 TABLET ORAL at 09:21

## 2023-10-19 RX ADMIN — METOCLOPRAMIDE 10 MG: 10 TABLET ORAL at 09:21

## 2023-10-19 RX ADMIN — MORPHINE SULFATE 2 MG: 2 INJECTION, SOLUTION INTRAMUSCULAR; INTRAVENOUS at 06:55

## 2023-10-19 RX ADMIN — METOPROLOL TARTRATE 50 MG: 50 TABLET, FILM COATED ORAL at 09:21

## 2023-10-19 RX ADMIN — ALUMINUM HYDROXIDE, MAGNESIUM HYDROXIDE, AND DIMETHICONE 30 ML: 200; 20; 200 SUSPENSION ORAL at 09:27

## 2023-10-19 RX ADMIN — ASPIRIN 81 MG CHEWABLE TABLET 81 MG: 81 TABLET CHEWABLE at 09:21

## 2023-10-19 RX ADMIN — SUCRALFATE 1 G: 1 TABLET ORAL at 15:53

## 2023-10-19 RX ADMIN — KETOROLAC TROMETHAMINE 15 MG: 30 INJECTION, SOLUTION INTRAMUSCULAR; INTRAVENOUS at 11:40

## 2023-10-19 RX ADMIN — SENNOSIDES 8.6 MG: 8.6 TABLET, FILM COATED ORAL at 09:22

## 2023-10-19 NOTE — PLAN OF CARE
Problem: PAIN - ADULT  Goal: Verbalizes/displays adequate comfort level or baseline comfort level  Description: Interventions:  - Encourage patient to monitor pain and request assistance  - Assess pain using appropriate pain scale  - Administer analgesics based on type and severity of pain and evaluate response  - Implement non-pharmacological measures as appropriate and evaluate response  - Consider cultural and social influences on pain and pain management  - Notify physician/advanced practitioner if interventions unsuccessful or patient reports new pain  Outcome: Progressing     Problem: SAFETY ADULT  Goal: Patient will remain free of falls  Description: INTERVENTIONS:  - Educate patient/family on patient safety including physical limitations  - Instruct patient to call for assistance with activity   - Consult OT/PT to assist with strengthening/mobility   - Keep Call bell within reach  - Keep bed low and locked with side rails adjusted as appropriate  - Keep care items and personal belongings within reach  - Initiate and maintain comfort rounds  - Make Fall Risk Sign visible to staff  -- Apply yellow socks and bracelet for high fall risk patients  - Consider moving patient to room near nurses station  Outcome: Progressing  Goal: Maintain or return to baseline ADL function  Description: INTERVENTIONS:  -  Assess patient's ability to carry out ADLs; assess patient's baseline for ADL function and identify physical deficits which impact ability to perform ADLs (bathing, care of mouth/teeth, toileting, grooming, dressing, etc.)  - Assess/evaluate cause of self-care deficits   - Assess range of motion  - Assess patient's mobility; develop plan if impaired  - Assess patient's need for assistive devices and provide as appropriate  - Encourage maximum independence but intervene and supervise when necessary  - Involve family in performance of ADLs  - Assess for home care needs following discharge   - Consider OT consult to assist with ADL evaluation and planning for discharge  - Provide patient education as appropriate  Outcome: Progressing     Problem: DISCHARGE PLANNING  Goal: Discharge to home or other facility with appropriate resources  Description: INTERVENTIONS:  - Identify barriers to discharge w/patient and caregiver  - Arrange for needed discharge resources and transportation as appropriate  - Identify discharge learning needs (meds, wound care, etc.)  - Arrange for interpretive services to assist at discharge as needed  - Refer to Case Management Department for coordinating discharge planning if the patient needs post-hospital services based on physician/advanced practitioner order or complex needs related to functional status, cognitive ability, or social support system  Outcome: Progressing     Problem: GASTROINTESTINAL - ADULT  Goal: Minimal or absence of nausea and/or vomiting  Description: INTERVENTIONS:  - Administer IV fluids if ordered to ensure adequate hydration  - Maintain NPO status until nausea and vomiting are resolved  - Nasogastric tube if ordered  - Administer ordered antiemetic medications as needed  - Provide nonpharmacologic comfort measures as appropriate  - Advance diet as tolerated, if ordered  - Consider nutrition services referral to assist patient with adequate nutrition and appropriate food choices  Outcome: Progressing  Goal: Maintains or returns to baseline bowel function  Description: INTERVENTIONS:  - Assess bowel function  - Encourage oral fluids to ensure adequate hydration  - Administer IV fluids if ordered to ensure adequate hydration  - Administer ordered medications as needed  - Encourage mobilization and activity  - Consider nutritional services referral to assist patient with adequate nutrition and appropriate food choices  Outcome: Progressing  Goal: Maintains adequate nutritional intake  Description: INTERVENTIONS:  - Monitor percentage of each meal consumed  - Identify factors contributing to decreased intake, treat as appropriate  - Assist with meals as needed  - Monitor I&O, weight, and lab values if indicated  - Obtain nutrition services referral as needed  Outcome: Progressing     Problem: METABOLIC, FLUID AND ELECTROLYTES - ADULT  Goal: Electrolytes maintained within normal limits  Description: INTERVENTIONS:  - Monitor labs and assess patient for signs and symptoms of electrolyte imbalances  - Administer electrolyte replacement as ordered  - Monitor response to electrolyte replacements, including repeat lab results as appropriate  - Instruct patient on fluid and nutrition as appropriate  Outcome: Progressing  Goal: Fluid balance maintained  Description: INTERVENTIONS:  - Monitor labs   - Monitor I/O and WT  - Instruct patient on fluid and nutrition as appropriate  - Assess for signs & symptoms of volume excess or deficit  Outcome: Progressing  Goal: Glucose maintained within target range  Description: INTERVENTIONS:  - Monitor Blood Glucose as ordered  - Assess for signs and symptoms of hyperglycemia and hypoglycemia  - Administer ordered medications to maintain glucose within target range  - Assess nutritional intake and initiate nutrition service referral as needed  Outcome: Progressing

## 2023-10-19 NOTE — ASSESSMENT & PLAN NOTE
Presented with intractable RUQ and epigastric abdominal pain with reflux-like symptoms. Hx gastroparesis, recent EGD with botox on 10/10. Likely in setting of gastroparesis, GERD, ? biliary source  CT a/p w/ contrast shows no acute abdominal pelvic pathology  RUQ US with no acute pathologies, known liver lesions compatible with hepatic hemangiomas  Mild LFT elevation since 10/16, although nl lipase  MRI/MRCP: No evidence of choledocholithiasis. Lesions in the liver as described above compatible with hemangiomas. No suspicious liver lesions.   GI consulted  No acute interventions required at this time  Continue PPI BID, Reglan BID, Carafate QID  Avoid opiates, bowel regimen  Recommend outpatient follow-up with GI for further management

## 2023-10-19 NOTE — DISCHARGE SUMMARY
[address]  Discharge- Дмитрий Hernandez 1965, 66 y.o. female MRN: [id number]  Unit/Bed#: -[facility] Encounter: [id number]  Primary Care Provider: Efrain Berry [address] [place] [address]   Date and time admitted to hospital: 10/15/2023 11:41 AM    * Abdominal pain  Assessment & Plan  Presented with intractable RUQ and epigastric abdominal pain with reflux-like symptoms. Hx gastroparesis, recent EGD with botox on 10/10. Likely in setting of gastroparesis, GERD, ? biliary source  CT a/p w/ contrast shows no acute abdominal pelvic pathology  RUQ US with no acute pathologies, known liver lesions compatible with hepatic hemangiomas  Mild LFT elevation since 10/16, although nl lipase  MRI/MRCP: No evidence of choledocholithiasis. Lesions in the liver as described above compatible with hemangiomas. No suspicious liver lesions. GI consulted  No acute interventions required at this time  Continue PPI BID, Reglan BID, Carafate QID  Avoid opiates, bowel regimen  Recommend outpatient follow-up with GI for further management    Diabetes mellitus type 2 in obese   Assessment & Plan  Lab Results   Component Value Date    HGBA1C 5.5 09/03/2023       Recent Labs     10/18/23  1538 10/18/23  2029 10/19/23  0717 10/19/23  1124   POCGLU 86 182* 82 114         Blood Sugar Average: Last 72 hrs:  (P) [id number]    Restart Home regimen upon discharge:   Lantus 25U qHS and metformin.    Diabetic diet      Tobacco abuse  Assessment & Plan  Educated on smoking cessation  NRT    Acquired hypothyroidism  Assessment & Plan  Continue Home medication:  levothyroxine    Bipolar 1 disorder, depressed ([address])  Assessment & Plan  Continue Home medication:  Lexapro, aripiprazole and trazodone  Mood currently stable    Essential hypertension  Assessment & Plan  BP reviewed and fluctuating  Continue Home medication:  Metoprolol        Medical Problems       Resolved Problems  Date Reviewed: 10/19/2023   None       Discharging Physician / Practitioner: YONG Loera  PCP: Navi Villarreal, [address] [facility]  Admission Date:   Admission Orders (From admission, onward)       Ordered        10/16/23 1549  Inpatient Admission  Once            10/15/23 1528  Place in Observation  Once                          Discharge Date: 10/19/23    Consultations During Hospital Stay:  Gastroenterology    Procedures Performed:   None    Significant Findings / Test Results:   MRI/MRCP: Lesions in the liver as described above compatible with hemangiomas. No suspicious liver lesions. Incidental Findings:   None    Test Results Pending at Discharge (will require follow up): None     Outpatient Tests Requested:  Recommend outpatient follow up with PCP  Recommend outpatient follow up with GI    Complications:  None    Reason for Admission: Abd Pain    Hospital Course:   Divine Muir is a 66 y.o. female patient with a PMH of gastroparesis, generalized anxiety disorder, depression, bipolar disorder, diabetes mellitus type 2 who originally presented to the hospital on 10/15/2023 due to complaints of right upper quadrant abdominal pain. Patient states the pain has been going on for the past 2 to 3 days which is severe in intensity of 10/10. Has been having worsening epigastric pain with acid reflux. Patient follows up with Dr. Rai Mann outpatient. Patient had a EGD and Botox performed into the pyloric sphincter during the last admission      Upon arrival to the ED, patient's labs/vitals were generally unremarkable. Pain believe likely secondary to gastroparesis. GI was consulted. Patient was treated with conservative measures, placed n.p.o. and IV fluids were started. Patient had slight elevation in her LFTs. Right upper quadrant ultrasound was completed with no acute findings. MRI MRCP was conducted which confirmed no acute need for intervention at this time. Pain gradually improved while admitted, although still present on the day of discharge.   Patient states she always has some abdominal pain and does not know what its like to not have abdominal pain. Patient stating she is ready to go home and since we are not doing any further interventions, she can manage better in her home environment. Recommend outpatient follow-up with PCP and GI for further management. Luis plan with patient at bedside, all questions were answered. Please see above list of diagnoses and related plan for additional information. Condition at Discharge: fair    Discharge Day Visit / Exam:   Subjective: Patient states she still having some abdominal pain but much improved from admission. Patient states she is not used to not ever having abdominal pain. So appears to be baseline. Patient denies any active chest pain, shortness of breath, nausea, or vomiting. Vitals: Blood Pressure: 156/71 (10/19/23 0918)  Pulse: 57 (10/19/23 0918)  Temperature: 98.2 °F (36.8 °C) (10/19/23 0918)  Temp Source: Oral (10/19/23 0918)  Respirations: 20 (10/19/23 0918)  Height: 5' 3" (160 cm) (10/15/23 1656)  Weight - Scale: 72.1 kg (158 lb 14.4 oz) (10/15/23 1656)  SpO2: 99 % (10/19/23 0176)  Exam:   Physical Exam  Vitals and nursing note reviewed. Constitutional:       General: She is not in acute distress. Appearance: Normal appearance. HENT:      Head: Normocephalic. Nose: Nose normal. No congestion. Mouth/Throat:      Mouth: Mucous membranes are moist.      Pharynx: Oropharynx is clear. Cardiovascular:      Rate and Rhythm: Normal rate and regular rhythm. Pulses: Normal pulses. Heart sounds: Normal heart sounds. No murmur heard. Pulmonary:      Effort: Pulmonary effort is normal. No respiratory distress. Breath sounds: Normal breath sounds. Abdominal:      General: Bowel sounds are normal. There is distension. Palpations: Abdomen is soft. Tenderness: There is abdominal tenderness. Musculoskeletal:         General: Normal range of motion.       Cervical back: Normal range of motion. Skin:     General: Skin is warm and dry. Capillary Refill: Capillary refill takes less than 2 seconds. Neurological:      Mental Status: She is alert and oriented to person, place, and time. Mental status is at baseline. Motor: No weakness. Psychiatric:         Mood and Affect: Mood normal.         Speech: Speech is slurred (At Baseline). Behavior: Behavior is cooperative. Discussion with Family: Patient declined call to . Discharge instructions/Information to patient and family:   See after visit summary for information provided to patient and family. Provisions for Follow-Up Care:  See after visit summary for information related to follow-up care and any pertinent home health orders. Disposition:   Home    Planned Readmission: No. Likely unplanned readmission      Discharge Statement:  I spent 60 minutes discharging the patient. This time was spent on the day of discharge. I had direct contact with the patient on the day of discharge. Greater than 50% of the total time was spent examining patient, answering all patient questions, arranging and discussing plan of care with patient as well as directly providing post-discharge instructions. Additional time then spent on discharge activities. Discharge Medications:  See after visit summary for reconciled discharge medications provided to patient and/or family.       **Please Note: This note may have been constructed using a voice recognition system**

## 2023-10-19 NOTE — ASSESSMENT & PLAN NOTE
Lab Results   Component Value Date    HGBA1C 5.5 09/03/2023       Recent Labs     10/18/23  1538 10/18/23  2029 10/19/23  0717 10/19/23  1124   POCGLU 86 182* 82 114         Blood Sugar Average: Last 72 hrs:  (P) [id number]    Restart Home regimen upon discharge:   Lantus 25U qHS and metformin.    Diabetic diet

## 2023-10-19 NOTE — PROGRESS NOTES
Progress Note- Edwin Byrne 66 y.o. female MRN: [id number]    Unit/Bed#: -01 Encounter: [id number]      Assessment and Plan:    66 y.o. female with history of bipolar disorder, substance abuse, hypothyroidism, T2DM, HLD, HTN, cholecystectomy, gastroparesis with recent EGD botox 10/10 who presented 10/15 due to upper abdominal pain, reflux, and nausea. CBC, CMP, Lipase, CT abdomen pelvis unremarkable. #1 Upper abdominal pain   #2 Elevated LFTs  Epigastric/RUQ pain likely multifactorial secondary to diabetic gastroparesis, GERD, possible bile reflux, ?biliary w/ new elevation LFTs. Discussed with patient that it could take weeks for Botox to take effect after intra pyloric Botox injection. She was previously on Ozempic which was stopped. LFTs mildly day after admission, now trending down. RUQ US done showed CBD 8mm but this was felt to be due to post cholecystectomy state, she otherwise had evidence of a 1.3 suspected hemangioma and enlarged 21.6cm liver secondary to Reidel's lobe, liver appeared smooth in contour. MRI/MRCP negative for any choledocholithiasis or biliary dilation, CBD 6 mm. LFTs continue to trend down today and pain is improving.     -No need for ERCP  -Continue Reglan twice daily  -Continue PPI twice daily, Carafate QID  -Tight glycemic control  -Avoid medications which slow GI motility such as narcotics, anticholinergics, etc.  -Okay for discharge from GI standpoint for outpatient follow-up if tolerating diet and pain is controlled     #4 Constipation  -Continue bowel regimen with MiraLAX    ______________________________________________________________________    Subjective:     Patient reports abdominal pain is improving today. Denies any nausea or vomiting but does report some heartburn and abdominal bloating. She had a bowel movement this morning and is passing gas.       Medication Administration - last 24 hours from 10/18/2023 1717 to 10/19/2023 1717         Date/Time Order Dose Route Action Action by     10/19/2023 1620 EDT lactated ringers infusion 0 mL/hr Intravenous Stopped Phineas Bue, RN     10/19/2023 0139 EDT lactated ringers infusion 75 mL/hr Intravenous [address] [facility], [address]     10/19/2023 1053 EDT docusate sodium (COLACE) capsule 100 mg 100 mg Oral Given Phineas Bue, RN     10/18/2023 1802 EDT docusate sodium (COLACE) capsule 100 mg 100 mg Oral Given Phineas Bue, RN     10/19/2023 6146 EDT senna (SENOKOT) tablet 8.6 mg 8.6 mg Oral Given Phineas Bue, RN     10/19/2023 3247 EDT polyethylene glycol (MIRALAX) packet 17 g 17 g Oral Given Phineas Bue, RN     10/19/2023 0929 EDT nicotine (NICODERM CQ) 14 mg/24hr TD 24 hr patch 1 patch 1 patch Transdermal Patch Removed Phineas Bue, RN     10/19/2023 2167 EDT nicotine (NICODERM CQ) 14 mg/24hr TD 24 hr patch 1 patch 1 patch Transdermal Medication Applied Phineas Bue, RN     10/19/2023 7338 EDT ARIPiprazole (ABILIFY) tablet 10 mg 10 mg Oral Given Phineas Bue, RN     10/19/2023 9930 EDT aspirin chewable tablet 81 mg 81 mg Oral Given Phineas Bue, RN     10/19/2023 2219 EDT atorvastatin (LIPITOR) tablet 20 mg 20 mg Oral Given Phineas Bue, RN     10/18/2023 2020 EDT clonazePAM (KlonoPIN) tablet 0.5 mg 0.5 mg Oral Given [facility], RN     10/19/2023 1254 EDT escitalopram (LEXAPRO) tablet 10 mg 10 mg Oral Given Phineas Bue, RN     10/19/2023 1553 EDT gabapentin (NEURONTIN) capsule 400 mg 400 mg Oral Given Phineas Bue, RN     10/19/2023 4030 EDT gabapentin (NEURONTIN) capsule 400 mg 400 mg Oral Given Phineas Bue, RN     10/18/2023 2021 EDT gabapentin (NEURONTIN) capsule 400 mg 400 mg Oral Given Guillermina Houser RN     10/18/2023 2142 EDT insulin glargine (LANTUS) subcutaneous injection 25 Units 0.25 mL 25 Units Subcutaneous Given Guillermina Houser RN     10/19/2023 9912 EDT levothyroxine tablet 25 mcg 25 mcg Oral Given Guillermina Houser RN     10/19/2023 1515 EDT metoprolol tartrate (LOPRESSOR) tablet 50 mg 50 mg Oral Given Malathi Kida, RN     10/18/2023 2138 EDT metoprolol tartrate (LOPRESSOR) tablet 50 mg 50 mg Oral Given Melene Contras, RN     10/19/2023 2211 EDT metoclopramide (REGLAN) tablet 10 mg 10 mg Oral Given Malathi Leathaa, RN     10/18/2023 1803 EDT metoclopramide (REGLAN) tablet 10 mg 10 mg Oral Given Malathi Leathaa, RN     10/18/2023 2148 EDT traZODone (DESYREL) tablet 200 mg 200 mg Oral Given Melene Contras, RN     10/19/2023 1205 EDT insulin lispro (HumaLOG) 100 units/mL subcutaneous injection 1-5 Units -- Subcutaneous Not Given Malathi Leathaa, RN     10/19/2023 9853 EDT insulin lispro (HumaLOG) 100 units/mL subcutaneous injection 1-5 Units -- Subcutaneous Not Given Malathi Reena, RN     10/18/2023 2149 EDT insulin lispro (HumaLOG) 100 units/mL subcutaneous injection 1-5 Units 1 Units Subcutaneous Given Melene Contras, RN     10/19/2023 1553 EDT sucralfate (CARAFATE) tablet 1 g 1 g Oral Given Malathi Kida, RN     10/19/2023 1140 EDT sucralfate (CARAFATE) tablet 1 g 1 g Oral Given Malathi Kida, RN     10/19/2023 3961 EDT sucralfate (CARAFATE) tablet 1 g 1 g Oral Given Melene Contras, RN     10/18/2023 2148 EDT sucralfate (CARAFATE) tablet 1 g 1 g Oral Given Melene Contras, RN     10/19/2023 2473 EDT pantoprazole (PROTONIX) EC tablet 40 mg 40 mg Oral Given Malathi Kida, RN     10/18/2023 2021 EDT pantoprazole (PROTONIX) EC tablet 40 mg 40 mg Oral Given Melene Contras, RN     10/19/2023 4743 EDT enoxaparin (LOVENOX) subcutaneous injection 40 mg 40 mg Subcutaneous Given Malathi Reena, RN     10/19/2023 0207 EDT morphine injection 2 mg 2 mg Intravenous Given Melene Contras, RN     10/18/2023 2015 EDT morphine injection 2 mg 2 mg Intravenous Given Conchita Amanda RN     10/19/2023 1140 EDT ketorolac (TORADOL) injection 15 mg 15 mg Intravenous Given Malathi Valles RN     10/19/2023 0145 EDT ketorolac (TORADOL) injection 15 mg 15 mg Intravenous Given Nuzhat Morris RN     10/18/2023 1816 EDT ketorolac (TORADOL) injection 15 mg 15 mg Intravenous Given Mindy Rivas RN     10/19/2023 7692 EDT aluminum-magnesium hydroxide-simethicone (MAALOX) oral suspension 30 mL 30 mL Oral Given Mindy Rivas RN            Objective:     Vitals: Blood pressure 156/71, pulse 57, temperature 98.2 °F (36.8 °C), temperature source Oral, resp. rate 20, height 5' 3" (1.6 m), weight 72.1 kg (158 lb 14.4 oz), SpO2 99 %, not currently breastfeeding. ,Body mass index is 28.15 kg/m².       Intake/Output Summary (Last 24 hours) at 10/19/2023 1717  Last data filed at 10/19/2023 1620  Gross per 24 hour   Intake 4035 ml   Output --   Net 4035 ml       Physical Exam:   General Appearance: Awake and alert, in no acute distress  Abdomen: Soft, non-tender, soft distended; bowel sounds normal; no masses or no organomegaly    Invasive Devices       None                   Lab Results:  Admission on 10/15/2023, Discharged on 10/19/2023   Component Date Value    WBC 10/15/2023 10.07     RBC 10/15/2023 4.60     Hemoglobin 10/15/2023 13.3     Hematocrit 10/15/2023 40.0     MCV 10/15/2023 87     MCH 10/15/2023 28.9     MCHC 10/15/2023 33.3     RDW 10/15/2023 12.6     MPV 10/15/2023 9.6     Platelets 43/31/0146 377     nRBC 10/15/2023 0     Neutrophils Relative 10/15/2023 61     Immat GRANS % 10/15/2023 0     Lymphocytes Relative 10/15/2023 31     Monocytes Relative 10/15/2023 7     Eosinophils Relative 10/15/2023 1     Basophils Relative 10/15/2023 0     Neutrophils Absolute 10/15/2023 6.10     Immature Grans Absolute 10/15/2023 0.03     Lymphocytes Absolute 10/15/2023 3.10     Monocytes Absolute 10/15/2023 0.71     Eosinophils Absolute 10/15/2023 0.10     Basophils Absolute 10/15/2023 0.03     Sodium 10/15/2023 138     Potassium 10/15/2023 4.0     Chloride 10/15/2023 101     CO2 10/15/2023 28     ANION GAP 10/15/2023 9     BUN 10/15/2023 12     Creatinine 10/15/2023 0.69 Glucose 10/15/2023 111     Calcium 10/15/2023 9.8     AST 10/15/2023 18     ALT 10/15/2023 14     Alkaline Phosphatase 10/15/2023 77     Total Protein 10/15/2023 7.9     Albumin 10/15/2023 4.6     Total Bilirubin 10/15/2023 0.45     eGFR 10/15/2023 97     Lipase 10/15/2023 13     hs TnI 0hr 10/15/2023 3     Ventricular Rate 10/15/2023 67     Atrial Rate 10/15/2023 67     NJ Interval 10/15/2023 214     QRSD Interval 10/15/2023 66     QT Interval 10/15/2023 416     QTC Interval 10/15/2023 439     P Axis 10/15/2023 49     QRS Axis 10/15/2023 5     T Wave Bryan 10/15/2023 32     POC Glucose 10/15/2023 78     POC Glucose 10/15/2023 83     Magnesium 10/16/2023 2.3     Sodium 10/16/2023 137     Potassium 10/16/2023 4.3     Chloride 10/16/2023 104     CO2 10/16/2023 27     ANION GAP 10/16/2023 6     BUN 10/16/2023 11     Creatinine 10/16/2023 0.59 (L)     Glucose 10/16/2023 94     Glucose, Fasting 10/16/2023 94     Calcium 10/16/2023 9.1     AST 10/16/2023 76 (H)     ALT 10/16/2023 86 (H)     Alkaline Phosphatase 10/16/2023 121 (H)     Total Protein 10/16/2023 6.5     Albumin 10/16/2023 3.8     Total Bilirubin 10/16/2023 0.41     eGFR 10/16/2023 102     Lipase 10/16/2023 7 (L)     POC Glucose 10/16/2023 93     POC Glucose 10/16/2023 103     POC Glucose 10/16/2023 97     POC Glucose 10/16/2023 199 (H)     Sodium 10/17/2023 140     Potassium 10/17/2023 3.7     Chloride 10/17/2023 104     CO2 10/17/2023 30     ANION GAP 10/17/2023 6     BUN 10/17/2023 7     Creatinine 10/17/2023 0.59 (L)     Glucose 10/17/2023 87     Calcium 10/17/2023 8.9     AST 10/17/2023 43 (H)     ALT 10/17/2023 78 (H)     Alkaline Phosphatase 10/17/2023 150 (H)     Total Protein 10/17/2023 6.5     Albumin 10/17/2023 3.7     Total Bilirubin 10/17/2023 0.35     eGFR 10/17/2023 102     WBC 10/17/2023 7.29     RBC 10/17/2023 4.11     Hemoglobin 10/17/2023 11.9     Hematocrit 10/17/2023 36.6     MCV 10/17/2023 89     MCH 10/17/2023 29.0     MCHC 10/17/2023 32.5     RDW 10/17/2023 12.3     MPV 10/17/2023 9.6     Platelets 23/07/9446 273     nRBC 10/17/2023 0     Neutrophils Relative 10/17/2023 69     Immat GRANS % 10/17/2023 0     Lymphocytes Relative 10/17/2023 21     Monocytes Relative 10/17/2023 9     Eosinophils Relative 10/17/2023 1     Basophils Relative 10/17/2023 0     Neutrophils Absolute 10/17/2023 4.95     Immature Grans Absolute 10/17/2023 0.01     Lymphocytes Absolute 10/17/2023 1.56     Monocytes Absolute 10/17/2023 0.67     Eosinophils Absolute 10/17/2023 0.08     Basophils Absolute 10/17/2023 0.02     POC Glucose 10/17/2023 87     POC Glucose 10/17/2023 107     POC Glucose 10/17/2023 115     POC Glucose 10/17/2023 84     Sodium 10/18/2023 142     Potassium 10/18/2023 3.7     Chloride 10/18/2023 105     CO2 10/18/2023 31     ANION GAP 10/18/2023 6     BUN 10/18/2023 6     Creatinine 10/18/2023 0.62     Glucose 10/18/2023 76     Calcium 10/18/2023 9.0     AST 10/18/2023 26     ALT 10/18/2023 54 (H)     Alkaline Phosphatase 10/18/2023 119 (H)     Total Protein 10/18/2023 6.2 (L)     Albumin 10/18/2023 3.6     Total Bilirubin 10/18/2023 0.32     eGFR 10/18/2023 101     POC Glucose 10/18/2023 84     POC Glucose 10/18/2023 76     POC Glucose 10/18/2023 86     POC Glucose 10/18/2023 182 (H)     Sodium 10/19/2023 143     Potassium 10/19/2023 3.7     Chloride 10/19/2023 106     CO2 10/19/2023 31     ANION GAP 10/19/2023 6     BUN 10/19/2023 13     Creatinine 10/19/2023 0.70     Glucose 10/19/2023 71     Calcium 10/19/2023 9.1     AST 10/19/2023 19     ALT 10/19/2023 45     Alkaline Phosphatase 10/19/2023 113 (H)     Total Protein 10/19/2023 6.1 (L)     Albumin 10/19/2023 3.5     Total Bilirubin 10/19/2023 0.37     eGFR 10/19/2023 97     WBC 10/19/2023 6.46     RBC 10/19/2023 4.12     Hemoglobin 10/19/2023 11.5     Hematocrit 10/19/2023 36.3     MCV 10/19/2023 88     MCH 10/19/2023 27.9     MCHC 10/19/2023 31.7     RDW 10/19/2023 12.5     MPV 10/19/2023 9.9 Platelets 96/75/5528 282     nRBC 10/19/2023 0     Neutrophils Relative 10/19/2023 57     Immat GRANS % 10/19/2023 0     Lymphocytes Relative 10/19/2023 32     Monocytes Relative 10/19/2023 9     Eosinophils Relative 10/19/2023 2     Basophils Relative 10/19/2023 0     Neutrophils Absolute 10/19/2023 3.66     Immature Grans Absolute 10/19/2023 0.02     Lymphocytes Absolute 10/19/2023 2.07     Monocytes Absolute 10/19/2023 0.57     Eosinophils Absolute 10/19/2023 0.12     Basophils Absolute 10/19/2023 0.02     Protime 10/19/2023 12.8     INR 10/19/2023 0.94     POC Glucose 10/19/2023 82     POC Glucose 10/19/2023 114        Imaging Studies: I have personally reviewed pertinent imaging studies.

## 2023-10-19 NOTE — PLAN OF CARE
Problem: PAIN - ADULT  Goal: Verbalizes/displays adequate comfort level or baseline comfort level  Description: Interventions:  - Encourage patient to monitor pain and request assistance  - Assess pain using appropriate pain scale  - Administer analgesics based on type and severity of pain and evaluate response  - Implement non-pharmacological measures as appropriate and evaluate response  - Consider cultural and social influences on pain and pain management  - Notify physician/advanced practitioner if interventions unsuccessful or patient reports new pain  Outcome: Progressing     Problem: SAFETY ADULT  Goal: Patient will remain free of falls  Description: INTERVENTIONS:  - Educate patient/family on patient safety including physical limitations  - Instruct patient to call for assistance with activity   - Consult OT/PT to assist with strengthening/mobility   - Keep Call bell within reach  - Keep bed low and locked with side rails adjusted as appropriate  - Keep care items and personal belongings within reach  - Initiate and maintain comfort rounds  - Make Fall Risk Sign visible to staff  - Apply yellow socks and bracelet for high fall risk patients  - Consider moving patient to room near nurses station  Outcome: Progressing     Problem: DISCHARGE PLANNING  Goal: Discharge to home or other facility with appropriate resources  Description: INTERVENTIONS:  - Identify barriers to discharge w/patient and caregiver  - Arrange for needed discharge resources and transportation as appropriate  - Identify discharge learning needs (meds, wound care, etc.)  - Arrange for interpretive services to assist at discharge as needed  - Refer to Case Management Department for coordinating discharge planning if the patient needs post-hospital services based on physician/advanced practitioner order or complex needs related to functional status, cognitive ability, or social support system  Outcome: Progressing

## 2023-10-20 NOTE — UTILIZATION REVIEW
NOTIFICATION OF ADMISSION DISCHARGE   This is a Notification of Discharge from [address] [facility]. Please be advised that this patient has been discharge from our facility. Below you will find the admission and discharge date and time including the patient’s disposition. UTILIZATION REVIEW CONTACT:  Sophie Roa  Utilization   Network Utilization Review Department  Phone: [phone] x carefully listen to the prompts. All voicemails are confidential.  Email: [email or web address]     ADMISSION INFORMATION  PRESENTATION DATE: 10/15/2023 11:41 AM  OBERVATION ADMISSION DATE:   INPATIENT ADMISSION DATE: 10/16/23  3:49 PM   DISCHARGE DATE: 10/19/2023  5:08 PM   DISPOSITION:Home/Self Care    Network Utilization Review Department  ATTENTION: Please call with any questions or concerns to [phone] and carefully listen to the prompts so that you are directed to the right person. All voicemails are confidential.   For Discharge needs, contact Care Management DC Support Team at [phone] opt. 2  Send all requests for admission clinical reviews, approved or denied determinations and any other requests to dedicated fax number below belonging to the campus where the patient is receiving treatment.  List of dedicated fax numbers for the Facilities:  [place] DENIALS (Administrative/Medical Necessity) [phone]   DISCHARGE SUPPORT TEAM (Network) [phone]   [address] [facility] (Maternity/NICU/Pediatrics) [phone]   [address] [facility] [address] [place] [address] [address] [phone]

## 2023-10-21 DIAGNOSIS — F31.9 BIPOLAR 1 DISORDER, DEPRESSED (HCC): ICD-10-CM

## 2023-10-21 DIAGNOSIS — I10 PRIMARY HYPERTENSION: ICD-10-CM

## 2023-10-23 ENCOUNTER — PATIENT OUTREACH (OUTPATIENT)
Dept: CASE MANAGEMENT | Facility: OTHER | Age: 58
End: 2023-10-23

## 2023-10-23 ENCOUNTER — TRANSITIONAL CARE MANAGEMENT (OUTPATIENT)
Dept: FAMILY MEDICINE CLINIC | Facility: CLINIC | Age: 58
End: 2023-10-23

## 2023-10-23 ENCOUNTER — TELEPHONE (OUTPATIENT)
Dept: FAMILY MEDICINE CLINIC | Facility: CLINIC | Age: 58
End: 2023-10-23

## 2023-10-23 RX ORDER — GABAPENTIN 800 MG/1
TABLET ORAL
Qty: 90 TABLET | Refills: 5 | Status: SHIPPED | OUTPATIENT
Start: 2023-10-23

## 2023-10-23 RX ORDER — METOPROLOL TARTRATE 50 MG/1
50 TABLET, FILM COATED ORAL EVERY 12 HOURS
Qty: 60 TABLET | Refills: 5 | Status: SHIPPED | OUTPATIENT
Start: 2023-10-23

## 2023-10-23 NOTE — PROGRESS NOTES
I spoke with patient who discharged from [facility] on 10/19 after admission for abdominal pain. She reports she has fatigue and is not able to tolerate food, only eating small amounts. I advised her to make an appointment with gastroenterologist. She reports she did have and EGD with Botox injection on 10/10 but has not noticed any improvement in her symptoms. I did advise her of the side effects of metformin and encouraged her to speak with her PCP ay her appointment on 10/27. She did not commit to care management but appreciates the calls. She has my contact information and I encouraged her to call me if I can assist her.

## 2023-10-23 NOTE — TELEPHONE ENCOUNTER
Spoke with patient and completed TCM call. Patient informed that clerical would be calling to get her scheduled to come in and see PCP for TCM visit. Messaged clerical to please call patient to assist in scheduling appointment.

## 2023-10-24 ENCOUNTER — OFFICE VISIT (OUTPATIENT)
Dept: FAMILY MEDICINE CLINIC | Facility: CLINIC | Age: 58
End: 2023-10-24
Payer: COMMERCIAL

## 2023-10-24 VITALS
HEART RATE: 76 BPM | DIASTOLIC BLOOD PRESSURE: 70 MMHG | HEIGHT: 63 IN | WEIGHT: 161.2 LBS | SYSTOLIC BLOOD PRESSURE: 126 MMHG | RESPIRATION RATE: 16 BRPM | TEMPERATURE: 97.3 F | BODY MASS INDEX: 28.56 KG/M2 | OXYGEN SATURATION: 98 %

## 2023-10-24 DIAGNOSIS — R10.13 EPIGASTRIC PAIN: ICD-10-CM

## 2023-10-24 DIAGNOSIS — Z09 HOSPITAL DISCHARGE FOLLOW-UP: Primary | ICD-10-CM

## 2023-10-24 DIAGNOSIS — I10 ESSENTIAL HYPERTENSION: ICD-10-CM

## 2023-10-24 DIAGNOSIS — K21.9 GASTROESOPHAGEAL REFLUX DISEASE WITHOUT ESOPHAGITIS: ICD-10-CM

## 2023-10-24 DIAGNOSIS — K31.84 GASTROPARESIS: ICD-10-CM

## 2023-10-24 PROBLEM — R06.82 TACHYPNEA: Status: RESOLVED | Noted: 2022-03-28 | Resolved: 2023-10-24

## 2023-10-24 PROBLEM — Z00.8 MEDICAL CLEARANCE FOR PSYCHIATRIC ADMISSION: Status: RESOLVED | Noted: 2020-09-03 | Resolved: 2023-10-24

## 2023-10-24 PROBLEM — A64 STI (SEXUALLY TRANSMITTED INFECTION): Status: RESOLVED | Noted: 2021-03-23 | Resolved: 2023-10-24

## 2023-10-24 PROBLEM — E66.01 OBESITY, MORBID (HCC): Status: RESOLVED | Noted: 2021-12-09 | Resolved: 2023-10-24

## 2023-10-24 PROBLEM — Z79.899 LONG-TERM USE OF HIGH-RISK MEDICATION: Status: RESOLVED | Noted: 2023-01-30 | Resolved: 2023-10-24

## 2023-10-24 PROBLEM — E87.29 INCREASED ANION GAP METABOLIC ACIDOSIS: Status: RESOLVED | Noted: 2021-03-23 | Resolved: 2023-10-24

## 2023-10-24 PROBLEM — Z13.89 SCREENING FOR BLOOD OR PROTEIN IN URINE: Status: RESOLVED | Noted: 2022-06-24 | Resolved: 2023-10-24

## 2023-10-24 PROBLEM — R47.1 DYSARTHRIA: Status: RESOLVED | Noted: 2023-09-03 | Resolved: 2023-10-24

## 2023-10-24 PROBLEM — G92.9 TOXIC ENCEPHALOPATHY: Status: RESOLVED | Noted: 2020-09-28 | Resolved: 2023-10-24

## 2023-10-24 PROBLEM — R00.0 TACHYCARDIA: Status: RESOLVED | Noted: 2022-03-28 | Resolved: 2023-10-24

## 2023-10-24 PROBLEM — R07.89 CHEST PRESSURE: Status: RESOLVED | Noted: 2021-09-02 | Resolved: 2023-10-24

## 2023-10-24 PROBLEM — J40 BRONCHITIS WITH ACUTE WHEEZING: Status: RESOLVED | Noted: 2022-06-19 | Resolved: 2023-10-24

## 2023-10-24 PROBLEM — R53.83 TIREDNESS: Status: RESOLVED | Noted: 2021-12-09 | Resolved: 2023-10-24

## 2023-10-24 PROCEDURE — 99495 TRANSJ CARE MGMT MOD F2F 14D: CPT | Performed by: NURSE PRACTITIONER

## 2023-10-24 NOTE — PROGRESS NOTES
TCM Call       Date and time call was made  10/23/2023 11:57 AM    Hospital care reviewed  Records reviewed    Patient was hospitialized at  [address]    Date of Admission  10/15/23    Date of discharge  10/19/23    Diagnosis  abdominal pain    Disposition  Home    Were the patients medications reviewed and updated  Yes    Current Symptoms  Weakness; Middle abdominal pain    Weakness severity  Moderate    Middle abdominal pain severity  Mild    Middle abdominal pain onset  Gradual    Vomitting severity  Moderate  patient stated she vomites in the morning only          TCM Call       Post hospital issues  None    Should patient be enrolled in anticoag monitoring? No    Scheduled for follow up?   Yes    Patients specialists  --  gastroenterologist    Did you obtain your prescribed medications  Yes    Do you need help managing your prescriptions or medications  No    Is transportation to your appointment needed  No    I have advised the patient to call PCP with any new or worsening symptoms  Lauro Purdy LPN

## 2023-10-24 NOTE — PROGRESS NOTES
TRANSITION OF CARE OFFICE VISIT  [facility] PRIMARY CARE [place]    NAME: Colin Blackmon  AGE: 66 y.o. SEX: female  : 1965     DATE: 10/24/2023     Assessment and Plan:     Problem List Items Addressed This Visit          Digestive    Gastroesophageal reflux disease without esophagitis       Cardiovascular and Mediastinum    Essential hypertension       Other    Abdominal pain     Other Visit Diagnoses       Hospital discharge follow-up    -  Primary    [facility] admit 10/15-  Epigastric pain    Gastroparesis            abd pain-slightly improved  S/p botox per UGI to treat gastroparesis per [place] ppi  Strongly encouraged smoking cessation  Continue stool softeners  [place] diet, 5 small meals per day. Keep food diary 3 days prior to seeing me-we will review  Recheck 6 weeks    I have spent a total time of 30 minutes on in caring for this patient including Diagnostic results, Prognosis, Risks and benefits of tx options, Instructions for management, Patient and family education, Importance of tx compliance, Risk factor reductions, Impressions, Counseling / Coordination of care, Documenting in the medical record, Reviewing / ordering tests, medicine, procedures   and Obtaining or reviewing history  .        Transitional Care Management Review:     Colin Blackmon is a 66 y.o. female here for TCM follow-up    During the TCM phone call patient stated:    TCM Call       Date and time call was made  10/23/2023 11:57 AM    Hospital care reviewed  Records reviewed    Patient was hospitialized at  [address]    Date of Admission  10/15/23    Date of discharge  10/19/23    Diagnosis  abdominal pain    Disposition  Home    Were the patients medications reviewed and updated  Yes    Current Symptoms  Weakness; Middle abdominal pain    Weakness severity  Moderate    Middle abdominal pain severity  Mild    Middle abdominal pain onset  Gradual    Vomitting severity Moderate  patient stated she vomites in the morning only          TCM Call       Post hospital issues  None    Should patient be enrolled in anticoag monitoring? No    Scheduled for follow up? Yes    Patients specialists  --  gastroenterologist    Did you obtain your prescribed medications  Yes    Do you need help managing your prescriptions or medications  No    Is transportation to your appointment needed  No    I have advised the patient to call PCP with any new or worsening symptoms  Brijesh Dasilva- ALEJANDRO             HPI:     Seen for discharge follow up  [facility] to [facility]  With complaints of right upper quadrant pain. She reported that she was experiencing 10 out of 10 pain for approximately 3 days. This was following a procedure with Dr. Aiyana Horn. She had an EGD with Botox injected into her pyloric sphincter to try to treat her gastroparesis. Admission work-up essentially unremarkable. MR I MRCP showed no acute need for intervention. Eventually abdominal pain did improve and she was discharged home recommended follow-up with GI and me. Today she reports that she is gradually feeling better. She had 3 good days. She is trying to eat smaller meals. [place] diet. She is eating applesauce white bread. Low-residue and very bland foods. The following portions of the patient's history were reviewed and updated as appropriate: allergies, current medications, past family history, past medical history, past social history, past surgical history and problem list.     Review of Systems:     Review of Systems   Constitutional:  Positive for fatigue. Negative for fever. HENT:  Negative for trouble swallowing. Respiratory:  Negative for cough and shortness of breath. Cardiovascular:  Negative for chest pain and palpitations. Gastrointestinal:  Positive for abdominal pain and constipation. Negative for abdominal distention, blood in stool, nausea and vomiting.    Genitourinary: Negative for difficulty urinating and dysuria. Neurological:  Negative for dizziness and weakness. Problem List:     Patient Active Problem List   Diagnosis    Abdominal pain    Essential hypertension    Alcohol abuse, in remission    Post-traumatic stress disorder, chronic    Generalized anxiety disorder    Elevated lactic acid level    Bipolar disorder current episode depressed (HCC)    Bipolar 1 disorder, depressed (HCC)    Benzodiazepine dependence, continuous (HCC)    Non compliance w medication regimen    Acquired hypothyroidism    Hypokalemia    Hyperlipemia    Transaminitis    Cognitive dysfunction in bipolar disorder ([address])    Tobacco abuse    Gastroesophageal reflux disease without esophagitis    Diabetes mellitus type 2 in obese     Primary hypertension    Hypothyroidism    Vitamin D deficiency    Hypertensive urgency    Cigarette smoker    Chronic cough    Pulmonary emphysema, unspecified emphysema type (HCC)    Migraine without aura and without status migrainosus, not intractable        Objective:     /70 (BP Location: Left arm, Patient Position: Sitting, Cuff Size: Standard)   Pulse 76   Temp (!) 97.3 °F (36.3 °C) (Temporal)   Resp 16   Ht 5' 3" (1.6 m)   Wt 73.1 kg (161 lb 3.2 oz)   SpO2 98%   BMI 28.56 kg/m²     Physical Exam  Vitals and nursing note reviewed. Constitutional:       General: She is not in acute distress. Appearance: Normal appearance. Cardiovascular:      Rate and Rhythm: Normal rate and regular rhythm. Pulses: Normal pulses. Pulmonary:      Effort: Pulmonary effort is normal.      Breath sounds: Normal breath sounds. Abdominal:      General: Bowel sounds are normal.      Palpations: Abdomen is soft. Tenderness: There is abdominal tenderness. There is no guarding. Skin:     Coloration: Skin is not pale. Neurological:      General: No focal deficit present. Mental Status: She is alert. Mental status is at baseline.       Motor: No weakness. Gait: Gait normal.   Psychiatric:         Mood and Affect: Mood normal.          Laboratory Results: I have personally reviewed the pertinent laboratory results/reports     Radiology/Other Diagnostic Testing Results: I have personally reviewed pertinent reports. XR abdomen 1 view kub    Result Date: 3/18/2023  ABDOMEN INDICATION:   abd pain. COMPARISON:  CT abdomen pelvis exam dated one day prior and abdomen radiographs dated 1/25/2023 VIEWS:  AP supine FINDINGS: There is a nonspecific nonobstructive bowel gas pattern. No discernible free air on this supine study. Upright or left lateral decubitus imaging is more sensitive to detect subtle free air in the appropriate setting. No pathologic calcifications or soft tissue masses. Surgical clips seen in the right upper quadrant and pelvis Visualized lung bases are clear. Visualized osseous structures are unremarkable for the patient's age. Nonspecific nonobstructive bowel gas pattern. Workstation performed: [id number]     CT abdomen pelvis with contrast    Result Date: 3/17/2023  CT ABDOMEN AND PELVIS WITH IV CONTRAST INDICATION:   generalized abdominal pain, nausea, constipation. COMPARISON:  Most recent prior CT scan is dated January 25, 2023. TECHNIQUE:  CT examination of the abdomen and pelvis was performed. Axial, sagittal, and coronal 2D reformatted images were created from the source data and submitted for interpretation. Radiation dose length product (DLP) for this visit:  524 mGy-cm . This examination, like all CT scans performed in the [facility], was performed utilizing techniques to minimize radiation dose exposure, including the use of iterative reconstruction and automated exposure control. IV Contrast:  100 mL of iohexol (OMNIPAQUE) Enteric Contrast:  Enteric contrast was not administered. FINDINGS: ABDOMEN LOWER CHEST:  Unremarkable.  LIVER/BILIARY TREE:  Few low-attenuation lesions scattered throughout the liver which have been unchanged since at least 2000 compatible with a benign etiology. GALLBLADDER:  Gallbladder is surgically absent. SPLEEN:  Unremarkable. PANCREAS:  Unremarkable. ADRENAL GLANDS:  Unremarkable. KIDNEYS/URETERS:  Unremarkable. No hydronephrosis. STOMACH AND BOWEL:  Fluid-filled small bowel and colon with no bowel wall thickening or hyperemia of the bowel wall, findings which could be related to laxative use. Mild gastric wall thickening and mucosal hyperemia suggestive of gastritis. No adjacent inflammatory fat stranding. APPENDIX:  No findings to suggest appendicitis. ABDOMINOPELVIC CAVITY:  No ascites. No pneumoperitoneum. No lymphadenopathy. VESSELS:  Unremarkable for patient's age. PELVIS REPRODUCTIVE ORGANS:  Surgical changes of prior hysterectomy. URINARY BLADDER:  Unremarkable. ABDOMINAL WALL/INGUINAL REGIONS:  Unremarkable. OSSEOUS STRUCTURES:  No acute fracture or destructive osseous lesion. Findings concerning for gastritis. Recommend correlation with patient's symptoms. Fluid-filled colon and multiple small bowel loops but no wall thickening or hyperemia suggestive of laxative use. Workstation performed: [id number]        Current Medications:     Outpatient Medications Prior to Visit   Medication Sig Dispense Refill    Alcohol Swabs 70 % PADS May substitute brand based on insurance coverage. Check glucose TID.  100 each 0    aluminum-magnesium hydroxide-simethicone (MAALOX) 4657-5943-123 mg/30 mL suspension Take 30 mL by mouth every 4 (four) hours as needed for indigestion or heartburn (gas) 355 mL 0    ARIPiprazole (ABILIFY) 10 mg tablet Take 1 tablet (10 mg total) by mouth daily 30 tablet 0    aspirin 81 mg chewable tablet Chew 1 tablet (81 mg total) daily Do not start before September 7, 2023. 30 tablet 0    atorvastatin (LIPITOR) 20 mg tablet Take 1 tablet (20 mg total) by mouth daily 90 tablet 1    Blood Glucose Monitoring Suppl (OneTouch Verio Reflect) w/Device KIT May substitute brand based on insurance coverage. Check glucose TID. 1 kit 0    clonazePAM (KlonoPIN) 0.5 mg tablet Take 1 tablet (0.5 mg total) by mouth daily as needed for anxiety 30 tablet 0    dicyclomine (BENTYL) 10 mg capsule Take 2 capsules (20 mg total) by mouth 3 (three) times a day as needed (Cramping) 20 capsule 0    docusate sodium (COLACE) 100 mg capsule Take 1 capsule (100 mg total) by mouth 2 (two) times a day for 14 days 28 capsule 0    docusate sodium (COLACE) 100 mg capsule Take 1 capsule (100 mg total) by mouth 2 (two) times a day 60 capsule 0    ergocalciferol (VITAMIN D2) 50,000 units TAKE 1 CAPSULE BY MOUTH 1 TIME A WEEK 12 capsule 1    escitalopram (LEXAPRO) 10 mg tablet TAKE 1 TABLET(10 MG) BY MOUTH DAILY 90 tablet 1    gabapentin (NEURONTIN) 800 mg tablet TAKE 1 TABLET BY MOUTH 3 TIMES  DAILY 90 tablet 5    hydrochlorothiazide (HYDRODIURIL) 12.5 mg tablet TAKE 1 TABLET(12.5 MG) BY MOUTH DAILY 90 tablet 1    insulin glargine (LANTUS) 100 units/mL subcutaneous injection Inject 25 Units under the skin daily at bedtime 10 mL 0    levothyroxine 25 mcg tablet TAKE 1 TABLET(25 MCG) BY MOUTH DAILY IN THE EARLY MORNING 90 tablet 3    metFORMIN (GLUCOPHAGE) 1000 MG tablet TAKE 1 TABLET(1000 MG) BY MOUTH TWICE DAILY WITH MEALS 180 tablet 1    metoclopramide (Reglan) 10 mg tablet Take 1 tablet (10 mg total) by mouth 2 (two) times a day 90 tablet 0    metoprolol tartrate (LOPRESSOR) 50 mg tablet TAKE 1 TABLET BY MOUTH EVERY 12  HOURS 60 tablet 5    Microlet Lancets MISC       ondansetron (ZOFRAN) 4 mg tablet Take 1 tablet (4 mg total) by mouth every 8 (eight) hours as needed for nausea or vomiting 20 tablet 0    OneTouch Delica Lancets 54K MISC May substitute brand based on insurance coverage. Check glucose TID.  100 each 0    pantoprazole (PROTONIX) 40 mg tablet Take 1 tablet (40 mg total) by mouth 2 (two) times a day 180 tablet 0    senna (SENOKOT) 8.6 mg Take 1 tablet (8.6 mg total) by mouth daily Do not start before October 20, 2023. 30 tablet 0    sucralfate (CARAFATE) 1 g tablet Take 1 tablet (1 g total) by mouth 4 (four) times a day 120 tablet 1    SUMAtriptan (IMITREX) 50 mg tablet Take 1 tablet (50 mg total) by mouth once as needed for migraine for up to 1 dose may repeat in 2 hours if necessary 10 tablet 0    traZODone (DESYREL) 100 mg tablet Take 2 tablets (200 mg total) by mouth daily at bedtime 60 tablet 2    polyethylene glycol (MIRALAX) 17 g packet Take 17 g by mouth daily (Patient not taking: Reported on 10/24/2023) 30 each 0     No facility-administered medications prior to visit.        YONG Wise  [facility]

## 2023-10-26 DIAGNOSIS — K29.70 GASTRITIS WITHOUT BLEEDING, UNSPECIFIED CHRONICITY, UNSPECIFIED GASTRITIS TYPE: ICD-10-CM

## 2023-10-26 RX ORDER — SUCRALFATE 1 G/1
1 TABLET ORAL 4 TIMES DAILY
Qty: 360 TABLET | Refills: 1 | Status: SHIPPED | OUTPATIENT
Start: 2023-10-26

## 2023-10-27 ENCOUNTER — TELEPHONE (OUTPATIENT)
Age: 58
End: 2023-10-27

## 2023-10-27 ENCOUNTER — NURSE TRIAGE (OUTPATIENT)
Age: 58
End: 2023-10-27

## 2023-10-27 NOTE — TELEPHONE ENCOUNTER
Pt significant other called in to see what kind of vitamin B form did Dr. Kim Camacho recommended to the pt. Please advice thank you.

## 2023-10-27 NOTE — TELEPHONE ENCOUNTER
" I started yesterday with bloating and sharp pain that radiates to my back. I have been trying to go to the bathroom but I can't. The pain while I am trying to go is so severe. I feel weak and and tired and I dont want be in pain no more"   " I moved them somewhat today."  " Swelling is better after I got the stools out and the pain is better but I'm still uncomfortable"       Patient will increase miralax to BID and get a fleets enema to help get things moving. Patient was scheduled for an office visit. Reason for Disposition   Constipation is a recurrent ongoing problem (i.e., < 3 BMs / week or straining > 25% of the time)    Answer Assessment - Initial Assessment Questions  1. STOOL PATTERN OR FREQUENCY: "How often do you pass bowel movements (BMs)?"  (Normal range: tid to q 3 days)  "When was the last BM passed?"    Passed stool today   2. STRAINING: "Do you have to strain to have a BM?"       yes    6. CHRONIC CONSTIPATION: "Is this a new problem for you?"  If no, ask: "How long have you had this problem?" (days, weeks, months)       Yes     9.  LAXATIVES: "Have you been using any stool softeners, laxatives, or enemas?"  If yes, ask "What, how often, and when was the last time?"    Protocols used: Constipation-ADULT-[place]
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
## 2023-11-01 DIAGNOSIS — E11.43 DIABETIC GASTROPARESIS ASSOCIATED WITH TYPE 2 DIABETES MELLITUS: ICD-10-CM

## 2023-11-01 DIAGNOSIS — K31.84 DIABETIC GASTROPARESIS ASSOCIATED WITH TYPE 2 DIABETES MELLITUS: ICD-10-CM

## 2023-11-01 DIAGNOSIS — F43.10 PTSD (POST-TRAUMATIC STRESS DISORDER): ICD-10-CM

## 2023-11-01 RX ORDER — METOCLOPRAMIDE 10 MG/1
10 TABLET ORAL 2 TIMES DAILY
Qty: 90 TABLET | Refills: 0 | Status: SHIPPED | OUTPATIENT
Start: 2023-11-01 | End: 2023-11-02 | Stop reason: SDUPTHER

## 2023-11-01 RX ORDER — TRAZODONE HYDROCHLORIDE 100 MG/1
200 TABLET ORAL
Qty: 60 TABLET | Refills: 2 | Status: SHIPPED | OUTPATIENT
Start: 2023-11-01

## 2023-11-02 ENCOUNTER — TELEPHONE (OUTPATIENT)
Age: 58
End: 2023-11-02

## 2023-11-02 ENCOUNTER — HOSPITAL ENCOUNTER (EMERGENCY)
Facility: HOSPITAL | Age: 58
Discharge: HOME | End: 2023-11-02
Attending: EMERGENCY MEDICINE
Payer: COMMERCIAL

## 2023-11-02 ENCOUNTER — OFFICE VISIT (OUTPATIENT)
Dept: GASTROENTEROLOGY | Facility: CLINIC | Age: 58
End: 2023-11-02
Payer: COMMERCIAL

## 2023-11-02 ENCOUNTER — APPOINTMENT (EMERGENCY)
Dept: CT IMAGING | Facility: HOSPITAL | Age: 58
End: 2023-11-02
Payer: COMMERCIAL

## 2023-11-02 VITALS
HEIGHT: 63 IN | WEIGHT: 161.2 LBS | BODY MASS INDEX: 28.56 KG/M2 | DIASTOLIC BLOOD PRESSURE: 88 MMHG | SYSTOLIC BLOOD PRESSURE: 142 MMHG

## 2023-11-02 VITALS
OXYGEN SATURATION: 98 % | HEART RATE: 99 BPM | DIASTOLIC BLOOD PRESSURE: 93 MMHG | RESPIRATION RATE: 22 BRPM | SYSTOLIC BLOOD PRESSURE: 178 MMHG | TEMPERATURE: 97.7 F

## 2023-11-02 DIAGNOSIS — Z86.010 HX OF ADENOMATOUS COLONIC POLYPS: ICD-10-CM

## 2023-11-02 DIAGNOSIS — Z12.11 COLON CANCER SCREENING: ICD-10-CM

## 2023-11-02 DIAGNOSIS — R10.9 ABDOMINAL PAIN: Primary | ICD-10-CM

## 2023-11-02 DIAGNOSIS — R14.0 BLOATED ABDOMEN: ICD-10-CM

## 2023-11-02 DIAGNOSIS — K31.89 GASTRIC WALL THICKENING: ICD-10-CM

## 2023-11-02 DIAGNOSIS — R11.2 NAUSEA AND VOMITING, UNSPECIFIED VOMITING TYPE: ICD-10-CM

## 2023-11-02 DIAGNOSIS — K31.84 DIABETIC GASTROPARESIS: Primary | ICD-10-CM

## 2023-11-02 DIAGNOSIS — K59.00 CONSTIPATION, UNSPECIFIED CONSTIPATION TYPE: ICD-10-CM

## 2023-11-02 DIAGNOSIS — R10.10 PAIN OF UPPER ABDOMEN: ICD-10-CM

## 2023-11-02 DIAGNOSIS — E11.43 DIABETIC GASTROPARESIS: Primary | ICD-10-CM

## 2023-11-02 PROBLEM — Z86.0101 HX OF ADENOMATOUS COLONIC POLYPS: Status: ACTIVE | Noted: 2023-11-02

## 2023-11-02 LAB
ALBUMIN SERPL BCP-MCNC: 4.6 G/DL (ref 3.5–5)
ALP SERPL-CCNC: 81 U/L (ref 34–104)
ALT SERPL W P-5'-P-CCNC: 19 U/L (ref 7–52)
ANION GAP SERPL CALCULATED.3IONS-SCNC: 8 MMOL/L
AST SERPL W P-5'-P-CCNC: 21 U/L (ref 13–39)
BASOPHILS # BLD AUTO: 0.05 THOUSANDS/ÂΜL (ref 0–0.1)
BASOPHILS NFR BLD AUTO: 0 % (ref 0–1)
BILIRUB DIRECT SERPL-MCNC: 0.09 MG/DL (ref 0–0.2)
BILIRUB SERPL-MCNC: 0.4 MG/DL (ref 0.2–1)
BUN SERPL-MCNC: 9 MG/DL (ref 5–25)
CALCIUM SERPL-MCNC: 10.1 MG/DL (ref 8.4–10.2)
CHLORIDE SERPL-SCNC: 100 MMOL/L (ref 96–108)
CO2 SERPL-SCNC: 30 MMOL/L (ref 21–32)
CREAT SERPL-MCNC: 0.66 MG/DL (ref 0.6–1.3)
EOSINOPHIL # BLD AUTO: 0.11 THOUSAND/ÂΜL (ref 0–0.61)
EOSINOPHIL NFR BLD AUTO: 1 % (ref 0–6)
ERYTHROCYTE [DISTWIDTH] IN BLOOD BY AUTOMATED COUNT: 13 % (ref 11.6–15.1)
GFR SERPL CREATININE-BSD FRML MDRD: 99 ML/MIN/1.73SQ M
GLUCOSE SERPL-MCNC: 100 MG/DL (ref 65–140)
HCT VFR BLD AUTO: 42.3 % (ref 34.8–46.1)
HGB BLD-MCNC: 13.6 G/DL (ref 11.5–15.4)
IMM GRANULOCYTES # BLD AUTO: 0.04 THOUSAND/UL (ref 0–0.2)
IMM GRANULOCYTES NFR BLD AUTO: 0 % (ref 0–2)
LIPASE SERPL-CCNC: 12 U/L (ref 11–82)
LYMPHOCYTES # BLD AUTO: 3.69 THOUSANDS/ÂΜL (ref 0.6–4.47)
LYMPHOCYTES NFR BLD AUTO: 32 % (ref 14–44)
MCH RBC QN AUTO: 28.7 PG (ref 26.8–34.3)
MCHC RBC AUTO-ENTMCNC: 32.2 G/DL (ref 31.4–37.4)
MCV RBC AUTO: 89 FL (ref 82–98)
MONOCYTES # BLD AUTO: 0.7 THOUSAND/ÂΜL (ref 0.17–1.22)
MONOCYTES NFR BLD AUTO: 6 % (ref 4–12)
NEUTROPHILS # BLD AUTO: 6.9 THOUSANDS/ÂΜL (ref 1.85–7.62)
NEUTS SEG NFR BLD AUTO: 61 % (ref 43–75)
NRBC BLD AUTO-RTO: 0 /100 WBCS
PLATELET # BLD AUTO: 446 THOUSANDS/UL (ref 149–390)
PMV BLD AUTO: 9.5 FL (ref 8.9–12.7)
POTASSIUM SERPL-SCNC: 3.7 MMOL/L (ref 3.5–5.3)
PROT SERPL-MCNC: 8.3 G/DL (ref 6.4–8.4)
RBC # BLD AUTO: 4.74 MILLION/UL (ref 3.81–5.12)
SODIUM SERPL-SCNC: 138 MMOL/L (ref 135–147)
WBC # BLD AUTO: 11.49 THOUSAND/UL (ref 4.31–10.16)

## 2023-11-02 PROCEDURE — 80048 BASIC METABOLIC PNL TOTAL CA: CPT | Performed by: EMERGENCY MEDICINE

## 2023-11-02 PROCEDURE — 99285 EMERGENCY DEPT VISIT HI MDM: CPT | Performed by: EMERGENCY MEDICINE

## 2023-11-02 PROCEDURE — 96372 THER/PROPH/DIAG INJ SC/IM: CPT

## 2023-11-02 PROCEDURE — 74177 CT ABD & PELVIS W/CONTRAST: CPT

## 2023-11-02 PROCEDURE — 96361 HYDRATE IV INFUSION ADD-ON: CPT

## 2023-11-02 PROCEDURE — 80076 HEPATIC FUNCTION PANEL: CPT | Performed by: EMERGENCY MEDICINE

## 2023-11-02 PROCEDURE — 96375 TX/PRO/DX INJ NEW DRUG ADDON: CPT

## 2023-11-02 PROCEDURE — 99214 OFFICE O/P EST MOD 30 MIN: CPT | Performed by: NURSE PRACTITIONER

## 2023-11-02 PROCEDURE — 96374 THER/PROPH/DIAG INJ IV PUSH: CPT

## 2023-11-02 PROCEDURE — 36415 COLL VENOUS BLD VENIPUNCTURE: CPT | Performed by: EMERGENCY MEDICINE

## 2023-11-02 PROCEDURE — 85025 COMPLETE CBC W/AUTO DIFF WBC: CPT | Performed by: EMERGENCY MEDICINE

## 2023-11-02 PROCEDURE — 99284 EMERGENCY DEPT VISIT MOD MDM: CPT

## 2023-11-02 PROCEDURE — 83690 ASSAY OF LIPASE: CPT | Performed by: EMERGENCY MEDICINE

## 2023-11-02 RX ORDER — METOCLOPRAMIDE 10 MG/1
10 TABLET ORAL 2 TIMES DAILY
Qty: 180 TABLET | Refills: 2 | Status: SHIPPED | OUTPATIENT
Start: 2023-11-02 | End: 2023-11-02

## 2023-11-02 RX ORDER — ONDANSETRON 4 MG/1
4 TABLET, FILM COATED ORAL EVERY 8 HOURS PRN
Qty: 30 TABLET | Refills: 3 | Status: SHIPPED | OUTPATIENT
Start: 2023-11-02

## 2023-11-02 RX ORDER — KETOROLAC TROMETHAMINE 30 MG/ML
15 INJECTION, SOLUTION INTRAMUSCULAR; INTRAVENOUS ONCE
Status: COMPLETED | OUTPATIENT
Start: 2023-11-02 | End: 2023-11-02

## 2023-11-02 RX ORDER — DROPERIDOL 2.5 MG/ML
1.25 INJECTION, SOLUTION INTRAMUSCULAR; INTRAVENOUS ONCE
Status: COMPLETED | OUTPATIENT
Start: 2023-11-02 | End: 2023-11-02

## 2023-11-02 RX ORDER — PANTOPRAZOLE SODIUM 40 MG/1
40 TABLET, DELAYED RELEASE ORAL 2 TIMES DAILY
Qty: 180 TABLET | Refills: 2 | Status: SHIPPED | OUTPATIENT
Start: 2023-11-02 | End: 2024-01-31

## 2023-11-02 RX ORDER — ONDANSETRON 2 MG/ML
4 INJECTION INTRAMUSCULAR; INTRAVENOUS ONCE
Status: COMPLETED | OUTPATIENT
Start: 2023-11-02 | End: 2023-11-02

## 2023-11-02 RX ORDER — METOCLOPRAMIDE HYDROCHLORIDE 15 MG/.07ML
15 SPRAY NASAL
Qty: 9.8 ML | Refills: 3 | Status: SHIPPED | OUTPATIENT
Start: 2023-11-02 | End: 2023-11-12

## 2023-11-02 RX ADMIN — DROPERIDOL 1.25 MG: 2.5 INJECTION, SOLUTION INTRAMUSCULAR; INTRAVENOUS at 15:02

## 2023-11-02 RX ADMIN — ONDANSETRON 4 MG: 2 INJECTION INTRAMUSCULAR; INTRAVENOUS at 14:51

## 2023-11-02 RX ADMIN — IOHEXOL 100 ML: 350 INJECTION, SOLUTION INTRAVENOUS at 15:15

## 2023-11-02 RX ADMIN — DROPERIDOL 1.25 MG: 2.5 INJECTION, SOLUTION INTRAMUSCULAR; INTRAVENOUS at 15:39

## 2023-11-02 RX ADMIN — SODIUM CHLORIDE 1000 ML: 0.9 INJECTION, SOLUTION INTRAVENOUS at 14:51

## 2023-11-02 RX ADMIN — KETOROLAC TROMETHAMINE 15 MG: 30 INJECTION INTRAMUSCULAR; INTRAVENOUS at 14:55

## 2023-11-02 NOTE — PATIENT INSTRUCTIONS
Dr Ladarius Brown at [facility] for [address] Call insurance to find out if he is in network. If insurance tells you that Dr. Keven Samson is not in insurance network asked the insurance company who is in network at [facility] or [address] [place] [address]. Follow gastroparesis diet 6 small meals a day limit fiber to no more than 10 g daily. Gimoti 1 spray in one nostril 30 minutes prior to meals 3 times a day   Discontinue metoclopramide (Reglan). Linzess 145 mcg 1 daily in the morning for constipation-if you develop severe diarrhea from the Linzess call the office to get the decreased dose . IF it is not helping you may increase to 2 tablets daily in the morning and then notify the office for a change the prescription. (Increase doasgae 290 mcg)  Make follow-up with primary care physician about pain management
Spontaneous, unlabored and symmetrical

## 2023-11-02 NOTE — PROGRESS NOTES
[facility] - Outpatient Follow-up Note  Ally Bran 66 y.o. female MRN: [id number]  Encounter: [id number]          ASSESSMENT AND PLAN:      1. Diabetic gastroparesis   2. Nausea and vomiting, unspecified vomiting type  3. Abdomen pain  Patient has history of gastroparesis associated with nausea, vomiting, and abdominal pain. Patient was previously treated with Botox injection which was ineffective in relieving symptoms. Patient does take Zofran as needed and was on Reglan 2 times daily with no improvement in symptoms. EGD done 10/10/2023 showed subcentimeter polyp in duodenal bulb was removed. Upper third of esophagus, middle third of esophagus, lower third of esophagus, GE junction, and stomach appeared normal.  History of gastroparesis with liquid food and a lot of bile in the stomach. 200 IU Botox injection in pyloric channel. - Ambulatory Referral to Gastroenterology; Dr [address] Jiang [address] - pantoprazole (PROTONIX) 40 mg tablet; Take 1 tablet (40 mg total) by mouth 2 (two) times a day  Dispense: 180 tablet; Refill: 2  - ondansetron (ZOFRAN) 4 mg tablet; Take 1 tablet (4 mg total) by mouth every 8 (eight) hours as needed for nausea or vomiting  Dispense: 30 tablet; Refill: 3  -Discontinue metoclopramide.  -Start Gimoti 1 spray in 1 nostril 30 minutes before meals 3 times a day. Samples given. -Follow-up with Dr. Alden Durand to see if patient is a candidate for domperidone.    -Follow gastroparesis diet 6 small meals a day 10    4. Constipation, unspecified constipation type  Patient reports constipation despite being on Colace, MiraLAX, and senna. Patient reports small hard bowel movement this morning.    - linaCLOtide 145 MCG CAPS; Take 1 capsule (145 mcg total) by mouth in the morning  Dispense: 30 capsule; Refill: 3  -Samples of Linzess 145 mcg given. Patient informed if she is having profuse diarrhea contact the office for dosage can be decreased.   Patient informed if Linzess 145 mcg is not effective may increase to 2 tablets daily in morning and she should notify office for dosage can be increased to 290 mcg.  -Drink plenty of fluid     5. Bloated abdomen  Patient reports abdominal bloating. Abdominal bloating may be secondary to gastroparesis or small intestinal bacterial overgrowth. - SIBO testing    6. Colon cancer screening  Up to date    9. History of adenomatous colon polyp  Colonoscopy done 3/22/2023 showed ascending colon polyp removed. Normal colonic mucosa. Normal terminal ileum. Small internal hemorrhoids. Biopsy showed tubular adenoma. No microscopic colitis    -Surveillance colonoscopy due 3/2028      Follow up with Dr Terri Walker    ______________________________________________________________________    SUBJECTIVE:  Дмитрий Hernandez is a 81-year-old female who presents to office for follow-up. Patient was recently in the hospital due to abdominal pain with elevated LFTs and constipation. LFTs were initially elevated but then trended downward to normal except alk phos 113. Patient had MRI of the abdomen at that time which showed no evidence of choledocholithiasis. Lesion in the liver as described above compatible with hemangioma. No suspicious liver lesion. Patient has history of gastroparesis associated with nausea, vomiting, and abdominal pain. Gastric emptying study result was reviewed with the patient which shows severe gastroparesis, after 4-hour, 70% of the food still retained in the stomach. She was taking Ozempic which we discontinued. Elise Inch Positive tenderness in upper abdomen on palpation. Patient was previously treated with Botox injection which was ineffective in relieving symptoms. Patient does take Zofran as needed and was on Reglan 2 times daily with no improvement in symptoms. Patient also reports constipation. Last bowel movement this morning but only 1 very small amount.   Patient reports she has been taking MiraLAX, Colace, and Senokot with no improvement in constipation. No report of acid reflux or heartburn. Patient is on pantoprazole 40 mg 2 times a day. Patient denies any blood in stool, blood from rectal area, or black tarry stool. Colonoscopy done 3/22/2023 showed ascending colon polyp removed. Normal colonic mucosa. Normal terminal ileum. Small internal hemorrhoids. Biopsy showed tubular adenoma. No microscopic colitis. EGD done 10/10/2023 showed subcentimeter polyp in duodenal bulb was removed. Upper third of esophagus, middle third of esophagus, lower third of esophagus, GE junction, and stomach appeared normal.  History of gastroparesis with liquid food and a lot of bile in the stomach. 200 IU Botox injection in pyloric channel. Biopsy showed benign duodenal polyp. Previous EGD done 3/22/2023  biopsyshowed chronic inactive gastritis and intestinal metaplasia. No H. pylori. Small intestinal mucosa with patchy increased intraepithelial lymphocytes. REVIEW OF SYSTEMS IS OTHERWISE NEGATIVE.       Historical Information   Past Medical History:   Diagnosis Date    Addiction to drug ([address])     Adjustment disorder     Alcohol abuse     Alcoholism ([address])     Anxiety     Bipolar disorder ([address])     Bowel obstruction ([address])     Depression     Diabetes type 2, controlled ([address])     Disease of thyroid gland     Gastritis     Head injury     Heart palpitations     Hyperlipidemia     Hypertension     Hypothyroidism     Psychiatric illness     PTSD (post-traumatic stress disorder)     Seizure ([address])     Seizures ([address])     Sleep difficulties     Substance abuse ([address])     Suicide attempt ([address])     Withdrawal symptoms, drug or narcotic ([address])      Past Surgical History:   Procedure Laterality Date    ABDOMINAL SURGERY      APPENDECTOMY      BOWEL RESECTION      CHOLECYSTECTOMY      ESOPHAGOGASTRODUODENOSCOPY N/A 05/18/2018    Procedure: ESOPHAGOGASTRODUODENOSCOPY (EGD) with bx;  Surgeon: Dheeraj Hopkins DO;  Location: [facility]; Service: Gastroenterology    HYSTERECTOMY      KNEE SURGERY Bilateral 1991     Social History   Social History     Substance and Sexual Activity   Alcohol Use Not Currently    Alcohol/week: 6.0 standard drinks of alcohol    Types: 6 Cans of beer per week    Comment: last drink on 08/15/20     Social History     Substance and Sexual Activity   Drug Use Never     Social History     Tobacco Use   Smoking Status Every Day    Packs/day: 0.50    Years: 25.00    Total pack years: 12.50    Types: Cigarettes    Passive exposure: Current   Smokeless Tobacco Never   Tobacco Comments    smokes like 5 a day(06/24/2022)     Family History   Problem Relation Age of Onset    Bipolar disorder Mother     Cancer Father     Diabetes Father        Meds/Allergies       Current Outpatient Medications:     Alcohol Swabs 70 % PADS    aluminum-magnesium hydroxide-simethicone (MAALOX) 1258-3152-219 mg/30 mL suspension    ARIPiprazole (ABILIFY) 10 mg tablet    atorvastatin (LIPITOR) 20 mg tablet    Blood Glucose Monitoring Suppl (OneTouch Verio Reflect) w/Device KIT    clonazePAM (KlonoPIN) 0.5 mg tablet    dicyclomine (BENTYL) 10 mg capsule    docusate sodium (COLACE) 100 mg capsule    ergocalciferol (VITAMIN D2) 50,000 units    escitalopram (LEXAPRO) 10 mg tablet    gabapentin (NEURONTIN) 800 mg tablet    hydrochlorothiazide (HYDRODIURIL) 12.5 mg tablet    insulin glargine (LANTUS) 100 units/mL subcutaneous injection    levothyroxine 25 mcg tablet    linaCLOtide 145 MCG CAPS    metFORMIN (GLUCOPHAGE) 1000 MG tablet    metoprolol tartrate (LOPRESSOR) 50 mg tablet    Microlet Lancets MISC    ondansetron (ZOFRAN) 4 mg tablet    OneTouch Delica Lancets 84V MISC    pantoprazole (PROTONIX) 40 mg tablet    polyethylene glycol (MIRALAX) 17 g packet    senna (SENOKOT) 8.6 mg    sucralfate (CARAFATE) 1 g tablet    SUMAtriptan (IMITREX) 50 mg tablet    traZODone (DESYREL) 100 mg tablet    aspirin 81 mg chewable tablet    docusate sodium (COLACE) 100 mg capsule    Allergies   Allergen Reactions    Losartan Cough    Latex Rash           Objective     Blood pressure 142/88, height 5' 3" (1.6 m), weight 73.1 kg (161 lb 3.2 oz), not currently breastfeeding. Body mass index is 28.56 kg/m². PHYSICAL EXAM:      General Appearance:   Alert, cooperative, no distress   HEENT:   Normocephalic, atraumatic, anicteric. Neck:  Supple, symmetrical, trachea midline   Lungs:   Clear to auscultation bilaterally; no rales, rhonchi or wheezing; respirations unlabored    Heart[de-identified]   Regular rate and rhythm; no murmur, rub, or gallop. Abdomen:   Soft, tender to palpation in upper abdomen, positive distention; normal bowel sounds; no masses, no organomegaly    Genitalia:   Deferred    Rectal:   Deferred    Extremities:  No cyanosis, clubbing or edema    Pulses:  2+ and symmetric    Skin:  No jaundice, rashes, or lesions    Lymph nodes:  No palpable cervical lymphadenopathy        Lab Results:   No visits with results within 1 Day(s) from this visit.    Latest known visit with results is:   Admission on 10/15/2023, Discharged on 10/19/2023   Component Date Value    WBC 10/15/2023 10.07     RBC 10/15/2023 4.60     Hemoglobin 10/15/2023 13.3     Hematocrit 10/15/2023 40.0     MCV 10/15/2023 87     MCH 10/15/2023 28.9     MCHC 10/15/2023 33.3     RDW 10/15/2023 12.6     MPV 10/15/2023 9.6     Platelets 30/06/9875 377     nRBC 10/15/2023 0     Neutrophils Relative 10/15/2023 61     Immat GRANS % 10/15/2023 0     Lymphocytes Relative 10/15/2023 31     Monocytes Relative 10/15/2023 7     Eosinophils Relative 10/15/2023 1     Basophils Relative 10/15/2023 0     Neutrophils Absolute 10/15/2023 6.10     Immature Grans Absolute 10/15/2023 0.03     Lymphocytes Absolute 10/15/2023 3.10     Monocytes Absolute 10/15/2023 0.71     Eosinophils Absolute 10/15/2023 0.10     Basophils Absolute 10/15/2023 0.03     Sodium 10/15/2023 138     Potassium 10/15/2023 4.0     Chloride 10/15/2023 101 CO2 10/15/2023 28     ANION GAP 10/15/2023 9     BUN 10/15/2023 12     Creatinine 10/15/2023 0.69     Glucose 10/15/2023 111     Calcium 10/15/2023 9.8     AST 10/15/2023 18     ALT 10/15/2023 14     Alkaline Phosphatase 10/15/2023 77     Total Protein 10/15/2023 7.9     Albumin 10/15/2023 4.6     Total Bilirubin 10/15/2023 0.45     eGFR 10/15/2023 97     Lipase 10/15/2023 13     hs TnI 0hr 10/15/2023 3     Ventricular Rate 10/15/2023 67     Atrial Rate 10/15/2023 67     MT Interval 10/15/2023 214     QRSD Interval 10/15/2023 66     QT Interval 10/15/2023 416     QTC Interval 10/15/2023 439     P Axis 10/15/2023 49     QRS Axis 10/15/2023 5     T Wave [place] 10/15/2023 32     POC Glucose 10/15/2023 78     POC Glucose 10/15/2023 83     Magnesium 10/16/2023 2.3     Sodium 10/16/2023 137     Potassium 10/16/2023 4.3     Chloride 10/16/2023 104     CO2 10/16/2023 27     ANION GAP 10/16/2023 6     BUN 10/16/2023 11     Creatinine 10/16/2023 0.59 (L)     Glucose 10/16/2023 94     Glucose, Fasting 10/16/2023 94     Calcium 10/16/2023 9.1     AST 10/16/2023 76 (H)     ALT 10/16/2023 86 (H)     Alkaline Phosphatase 10/16/2023 121 (H)     Total Protein 10/16/2023 6.5     Albumin 10/16/2023 3.8     Total Bilirubin 10/16/2023 0.41     eGFR 10/16/2023 102     Lipase 10/16/2023 7 (L)     POC Glucose 10/16/2023 93     POC Glucose 10/16/2023 103     POC Glucose 10/16/2023 97     POC Glucose 10/16/2023 199 (H)     Sodium 10/17/2023 140     Potassium 10/17/2023 3.7     Chloride 10/17/2023 104     CO2 10/17/2023 30     ANION GAP 10/17/2023 6     BUN 10/17/2023 7     Creatinine 10/17/2023 0.59 (L)     Glucose 10/17/2023 87     Calcium 10/17/2023 8.9     AST 10/17/2023 43 (H)     ALT 10/17/2023 78 (H)     Alkaline Phosphatase 10/17/2023 150 (H)     Total Protein 10/17/2023 6.5     Albumin 10/17/2023 3.7     Total Bilirubin 10/17/2023 0.35     eGFR 10/17/2023 102     WBC 10/17/2023 7.29     RBC 10/17/2023 4.11     Hemoglobin 10/17/2023 11.9     Hematocrit 10/17/2023 36.6     MCV 10/17/2023 89     MCH 10/17/2023 29.0     MCHC 10/17/2023 32.5     RDW 10/17/2023 12.3     MPV 10/17/2023 9.6     Platelets 04/57/4914 273     nRBC 10/17/2023 0     Neutrophils Relative 10/17/2023 69     Immat GRANS % 10/17/2023 0     Lymphocytes Relative 10/17/2023 21     Monocytes Relative 10/17/2023 9     Eosinophils Relative 10/17/2023 1     Basophils Relative 10/17/2023 0     Neutrophils Absolute 10/17/2023 4.95     Immature Grans Absolute 10/17/2023 0.01     Lymphocytes Absolute 10/17/2023 1.56     Monocytes Absolute 10/17/2023 0.67     Eosinophils Absolute 10/17/2023 0.08     Basophils Absolute 10/17/2023 0.02     POC Glucose 10/17/2023 87     POC Glucose 10/17/2023 107     POC Glucose 10/17/2023 115     POC Glucose 10/17/2023 84     Sodium 10/18/2023 142     Potassium 10/18/2023 3.7     Chloride 10/18/2023 105     CO2 10/18/2023 31     ANION GAP 10/18/2023 6     BUN 10/18/2023 6     Creatinine 10/18/2023 0.62     Glucose 10/18/2023 76     Calcium 10/18/2023 9.0     AST 10/18/2023 26     ALT 10/18/2023 54 (H)     Alkaline Phosphatase 10/18/2023 119 (H)     Total Protein 10/18/2023 6.2 (L)     Albumin 10/18/2023 3.6     Total Bilirubin 10/18/2023 0.32     eGFR 10/18/2023 101     POC Glucose 10/18/2023 84     POC Glucose 10/18/2023 76     POC Glucose 10/18/2023 86     POC Glucose 10/18/2023 182 (H)     Sodium 10/19/2023 143     Potassium 10/19/2023 3.7     Chloride 10/19/2023 106     CO2 10/19/2023 31     ANION GAP 10/19/2023 6     BUN 10/19/2023 13     Creatinine 10/19/2023 0.70     Glucose 10/19/2023 71     Calcium 10/19/2023 9.1     AST 10/19/2023 19     ALT 10/19/2023 45     Alkaline Phosphatase 10/19/2023 113 (H)     Total Protein 10/19/2023 6.1 (L)     Albumin 10/19/2023 3.5     Total Bilirubin 10/19/2023 0.37     eGFR 10/19/2023 97     WBC 10/19/2023 6.46     RBC 10/19/2023 4.12     Hemoglobin 10/19/2023 11.5     Hematocrit 10/19/2023 36.3     MCV 10/19/2023 88 MCH 10/19/2023 27.9     MCHC 10/19/2023 31.7     RDW 10/19/2023 12.5     MPV 10/19/2023 9.9     Platelets 66/44/1818 282     nRBC 10/19/2023 0     Neutrophils Relative 10/19/2023 57     Immat GRANS % 10/19/2023 0     Lymphocytes Relative 10/19/2023 32     Monocytes Relative 10/19/2023 9     Eosinophils Relative 10/19/2023 2     Basophils Relative 10/19/2023 0     Neutrophils Absolute 10/19/2023 3.66     Immature Grans Absolute 10/19/2023 0.02     Lymphocytes Absolute 10/19/2023 2.07     Monocytes Absolute 10/19/2023 0.57     Eosinophils Absolute 10/19/2023 0.12     Basophils Absolute 10/19/2023 0.02     Protime 10/19/2023 12.8     INR 10/19/2023 0.94     POC Glucose 10/19/2023 82     POC Glucose 10/19/2023 114          Radiology Results:   MRI abdomen w wo contrast and mrcp    Result Date: 10/18/2023  Narrative: MRI OF THE ABDOMEN WITH AND WITHOUT CONTRAST WITH MRCP INDICATION: Abdominal pain rule out choledocholithiasis. COMPARISON: Most recent prior imaging include an ultrasound dated October 16, 2023 and a CT scan dated October 15, 2023. TECHNIQUE:  The following pulse sequences were obtained:  Coronal and axial T2 with TE of 90 and 180 respectively, axial T2 with fat saturation, axial FIESTA fat-sat, axial T1-weighted in-and-out-of phase, axial DWI/ADC, pre-contrast axial T1 with fat saturation, post-contrast dynamic axial T1 with fat saturation at 20, 70, and 180 seconds, followed by coronal and 7 minute delayed axial T1 with fat saturation. IV Contrast:  7 mL of Gadobutrol injection (SINGLE-DOSE) FINDINGS: LOWER CHEST:   Within normal limits. LIVER: 1.1 cm segment 2/3 lesion which is increased in signal intensity on fluid sensitive sequences, brighter than the spleen, and is decreased in enhancement relative to the background liver on arterial and portal venous phase, but begins to show nodular enhancement on the 7 minutes postcontrast image compatible with a hemangioma.  A 0.9 cm segment 7 lesion is only identified on the portal venous phase and on the B0 diffusion weighted images and is unchanged when compared to a CT scan dated September 2, 2020 and also likely representing a hemangioma. 0.6 cm lesion in segment 7 in the hepatic dome is increased in T2 signal and shows fill-in with contrast on the 7-minute delayed images and also likely represents a hemangioma. No suspicious mass. Patent hepatic and portal veins. BILE DUCTS: No intra or extrahepatic biliary ductal dilatation. The common bile duct measures approximately 6 mm. No evidence of choledocholithiasis. GALLBLADDER: Status post cholecystectomy. SPLEEN:  Within normal limits. PANCREAS:  Within normal limits. Pancreatic duct is normal in caliber and morphology. ADRENAL GLANDS:  Within normal limits. KIDNEYS: Normal. BOWEL:   Within normal limits. PERITONEUM/RETROPERITONEUM:  No ascites. LYMPH NODES:  No abdominal lymphadenopathy. VESSELS:  No aneurysm or acute findings. OSSEOUS STRUCTURES:  No suspicious osseous lesion. ABDOMINAL WALL:  Within normal limits. Impression: No evidence of choledocholithiasis. Lesions in the liver as described above compatible with hemangiomas. No suspicious liver lesions. Workstation performed: [id number]     US right upper quadrant    Result Date: 10/17/2023  Narrative: RIGHT UPPER QUADRANT ULTRASOUND INDICATION:     RUQ pain, elevated LFTs. COMPARISON: Multiple prior abdomen/pelvis CTs with the most recent being obtained 10/15/2023. TECHNIQUE:   Real-time ultrasound of the right upper quadrant was performed with a curvilinear transducer with both volumetric sweeps and still imaging techniques. FINDINGS: PANCREAS:  Portions of the pancreas are obscured by bowel gas. Visualized portions of the pancreas are grossly unremarkable. AORTA AND IVC:  Visualized portions are normal for patient age. LIVER: Size: The liver measures 21.6 cm in the midclavicular line.  This is above normal limits, however, it may be attributable to Riedel's lobe (normal variant) given the liver's appearance on recent CT exam.:  Surface contour is smooth. Parenchyma:  Echogenicity and echotexture are within normal limits. Ovoid hyperechoic lesion is seen within the left paddock lobe measuring 1.3 x 1.2 x 0.9 cm. This corresponds to hypoattenuated lesion demonstrated on prior abdominal CTs dating back to at least May 2018. Additional hyperechoic focus is seen within the right hepatic lobe measuring 0.7 x 0.7 x 0.5 cm. This this also corresponds to a hypoattenuating focus demonstrated on CT of the previous day. This focus is seen on prior CTs dating back to at least May 2018. Limited imaging of the main portal vein shows it to be patent and hepatopetal. BILIARY: Patient has undergone cholecystectomy. No intrahepatic biliary dilatation. CBD measures 8.0 mm. This is mildly dilated but felt to be attributable to physiologic postcholecystectomy changes. No no evidence of choledocholithiasis. KIDNEY: Right kidney measures 11.8 x 3.8 x 5.2 cm. Volume 122.4 mL Kidney within normal limits. ASCITES:   None. Impression: 1. No sonographic evidence of acute process within the right upper quadrant. 2. Hyperechoic liver lesions measuring up to 1.3 cm which correspond to hypoattenuated lesions seen on prior abdominal CTs dating back to 2018, most compatible with hepatic hemangiomas 3. The liver measures 21.6 cm in craniocaudal dimension. Although this is beyond the upper range of normal, is likely attributable to a Riedel lobe (normal variant) given the appearance of the liver on recent CT exam. 4. Status post cholecystectomy. Workstation performed: [id number]     XR chest 1 view portable    Result Date: 10/16/2023  Narrative: CHEST INDICATION:   indigestion. COMPARISON: Chest x-ray 6/19/2022 EXAM PERFORMED/VIEWS:  XR CHEST PORTABLE FINDINGS: Cardiomediastinal silhouette appears unremarkable. Minimal left basilar atelectasis suggested. Right lung is clear.   No pneumothorax or pleural effusion. Osseous structures appear within normal limits for patient age. Impression: Minimal left basilar atelectasis suggested. Workstation performed: [id number]     CT abdomen pelvis with contrast    Result Date: 10/15/2023  Narrative: CT ABDOMEN AND PELVIS WITH IV CONTRAST INDICATION:   Epigastric pain Epigastric Pain; Bloating; Recent EGD. COMPARISON: CT abdomen pelvis September 19, 2023 TECHNIQUE:  CT examination of the abdomen and pelvis was performed. Multiplanar 2D reformatted images were created from the source data. This examination, like all CT scans performed in the [facility], was performed utilizing techniques to minimize radiation dose exposure, including the use of iterative reconstruction and automated exposure control. Radiation dose length product (DLP) for this visit:  482 mGy-cm IV Contrast:  100 mL of iohexol (OMNIPAQUE) Enteric Contrast:  Enteric contrast was not administered. FINDINGS: ABDOMEN LOWER CHEST:  No clinically significant abnormality identified in the visualized lower chest. LIVER/BILIARY TREE: No surface nodularity. No suspicious hepatic lesion. Low-attenuation 1.3 cm lesion in the left lateral segment is unchanged, and may be hemangioma or cyst. Patent hepatic and portal veins. No biliary ductal dilation. GALLBLADDER: Gallbladder is surgically absent. SPLEEN:  Unremarkable. PANCREAS:  Unremarkable. ADRENAL GLANDS:  Unremarkable. KIDNEYS/URETERS:  Unremarkable. No hydronephrosis. STOMACH AND BOWEL:  Unremarkable. APPENDIX: Post appendectomy. ABDOMINOPELVIC CAVITY:  No ascites. No pneumoperitoneum. No lymphadenopathy. VESSELS: Atherosclerotic changes are present. No evidence of aneurysm. PELVIS REPRODUCTIVE ORGANS: Surgical changes of prior hysterectomy. URINARY BLADDER:  Unremarkable. ABDOMINAL WALL/INGUINAL REGIONS:  Unremarkable. OSSEOUS STRUCTURES:  No acute fracture or destructive osseous lesion.      Impression: No acute abdominopelvic pathology. Workstation performed: [id number]     EGD Botox    Result Date: 10/10/2023  Narrative: Table formatting from the original result was not included. [id number] [facility] [address] [place] [id number] [phone] DATE OF SERVICE: 10/10/23 PHYSICIAN(S): Attending: Salomon Fairbanks MD Fellow: No Staff Documented Procedure : EGD with cold snare polypectomy and intra pyloric Botox injection INDICATION: Epigastric pain, Diabetic gastroparesis POST-OP DIAGNOSIS: See the impression below. PREPROCEDURE: Informed consent was obtained for the procedure, including sedation. Risks of perforation, hemorrhage, adverse drug reaction and aspiration were discussed. The patient was placed in the left lateral decubitus position. Patient was explained about the risks and benefits of the procedure. Risks including but not limited to bleeding, infection, and perforation were explained in detail. Also explained about less than 100% sensitivity with the exam and other alternatives. PROCEDURE: EGD DETAILS OF PROCEDURE: Patient was taken to the procedure room where a time out was performed to confirm correct patient and correct procedure. The patient underwent monitored anesthesia care, which was administered by an anesthesia professional. The patient's blood pressure, heart rate, level of consciousness, respirations and oxygen were monitored throughout the procedure. The scope was introduced through the mouth and advanced to the second part of the duodenum. Retroflexion was performed in the cardia, fundus and incisura. Prior to the procedure, the patient's H. Pylori status was unknown. The patient experienced no blood loss. The procedure was not difficult. The patient tolerated the procedure well. There were no apparent adverse events. ANESTHESIA INFORMATION: ASA: III Anesthesia Type: Anesthesia type not filed in the log.  MEDICATIONS: onabotulinumtoxin A (BOTOX) injection 200 Units 200 Units (Totals for administrations occurring from 0907 to 0918 on 10/10/23) FINDINGS: One sessile, benign-appearing polyp measuring smaller than 5 mm in the duodenal bulb; completely removed en bloc by cold snare and retrieved specimen History of gastroparesis with some liquid food and lot of bile in the stomach, 200 International Units of Botox mix with 10 cc of saline and with the use of sclerotherapy needle, Botox was injected into pyloric channel 4 quadrant Regular Z-line 38 cm from the incisors The upper third of the esophagus, middle third of the esophagus, lower third of the esophagus, GE junction, fundus of the stomach, body of the stomach and antrum appeared normal. SPECIMENS: ID Type Source Tests Collected by Time Destination 1 : cold snare, polyp Tissue Jejunum TISSUE EXAM Gabi Rubio MD 10/10/2023  9:14 AM      Impression: Subcentimeter polyp in the duodenal bulb was removed with cold snare The upper third of the esophagus, middle third of the esophagus, lower third of the esophagus, GE junction, fundus of the stomach, body of the stomach and antrum appeared normal.         History of gastroparesis with liquid food and a lot of bile in the stomach RECOMMENDATION:  Await pathology results  Follow up with me in clinic      Tight Glycemic control       Gastroparesis diet which is low fiber and low fat in the diet   Gabi Rubio MD

## 2023-11-02 NOTE — TELEPHONE ENCOUNTER
Message from [address] [place] for [facility] not required for patient/medication   Case ID: [id number]      Payer: [employer] M Part D    [id number]   This medication or product is on your plan's list of covered drugs. Prior authorization is not required at this time. If your pharmacy has questions regarding the processing of your prescription, please have them call the [employer] pharmacy help desk at [phone]. **Please note: This request was submitted electronically. Formulary lowering, tiering exception, cost reduction and/or pre-benefit determination review (including prospective Medicare hospice reviews) requests cannot be requested using this method of submission. Providers contact us at [phone] for further assistance.    View History     Medication Being Authorized     linaCLOtide 145 MCG CAPS

## 2023-11-02 NOTE — TELEPHONE ENCOUNTER
PA submitted for Ondansetron via [employer]. Clinical questions answered, chart notes sent. Awaiting determination.

## 2023-11-02 NOTE — DISCHARGE INSTRUCTIONS
Follow-up with gastroenterologist.  Continue to take the intranasal Reglan, Zofran, other medications that are prescribed by your gastroenterologist.    Come back for new or worsening symptoms. CT scan of your abdomen pelvis showed thickening of your stomach wall similar to previous CT scans but otherwise no significant findings.

## 2023-11-02 NOTE — TELEPHONE ENCOUNTER
Prior Rejiriley Patterson was denied as not covered under Medicare part D. I will call the pharmacy and ask them to submit through Part B tomorrow.

## 2023-11-02 NOTE — TELEPHONE ENCOUNTER
----- Message from Slim Herrmann sent at 11/2/2023 12:53 PM EDT -----  Regarding: FW: prior auth  Please file prior auth for these meds. Thank you.  ----- Message -----  From: YONG Macdonald  Sent: 11/2/2023  12:51 PM EDT  To: Slim Herrmann  Subject: prior auth                                       Patient will need prior Auth for Zofran and Linzess please obtain.   Thank you

## 2023-11-02 NOTE — ED PROVIDER NOTES
History  Chief Complaint   Patient presents with    Abdominal Pain     Pt reports hx of gastroparesis with worsening abdominal pain     59-year-old female previous history of diabetes, diabetic gastroparesis followed by gastroenterology, hypertension, hyperlipidemia, alcoholism, anxiety, bipolar, depression, gastritis, PTSD, colon polyps. Patient presents the emergency department for nausea, abdominal pain. Reports constipation at home. Using enemas. Reports some output after each enema. No black or bloody stool. Reports nausea without vomiting. Pain has been constant and present in her abdomen for multiple weeks. Worsening over the last few days. Reports taking her Zofran, Reglan at home with no relief of symptoms. Was prescribed intranasal Reglan today. Did not take it as she took oral Reglan. Patient has been referred to [facility] for a G- POEM. Colonoscopy 3/22/2023 - ascending colon polyp removed. Small internal hemorrhoids. Biopsy showed tubular adenoma. EGD 10/10/2023 showed subcentimeter polyp in duodenal bulb was removed. History of gastroparesis with liquid food and a lot of bile in the stomach. 200 IU Botox injection in pyloric channel. Biopsy showed benign duodenal polyp. EGD 3/22/2023  biopsyshowed chronic inactive gastritis and intestinal metaplasia. No H. pylori. Small intestinal mucosa with patchy increased intraepithelial lymphocytes. Abdominal Pain  Pain location:  Epigastric and RUQ  Pain radiates to:  Does not radiate  Pain severity:  Severe  Onset quality:  Gradual  Timing:  Constant  Progression:  Worsening  Chronicity:  Chronic  Relieved by:  Nothing  Worsened by:  Nothing  Ineffective treatments: Antiemetics. Associated symptoms: nausea        Prior to Admission Medications   Prescriptions Last Dose Informant Patient Reported? Taking?    ARIPiprazole (ABILIFY) 10 mg tablet  Self No No   Sig: Take 1 tablet (10 mg total) by mouth daily   Alcohol Swabs 70 % PADS  Self No No   Sig: May substitute brand based on insurance coverage. Check glucose TID. Blood Glucose Monitoring Suppl (OneTouch Verio Reflect) w/Device KIT  Self No No   Sig: May substitute brand based on insurance coverage. Check glucose TID. Metoclopramide HCl (Gimoti) 15 MG/ACT SOLN   No No   Sig: 15 mg by Right Nare route 3 (three) times a day before meals for 30 doses   Microlet Lancets MISC  Self Yes No   OneTouch Delica Lancets 13I MISC  Self No No   Sig: May substitute brand based on insurance coverage. Check glucose TID. SUMAtriptan (IMITREX) 50 mg tablet  Self No No   Sig: Take 1 tablet (50 mg total) by mouth once as needed for migraine for up to 1 dose may repeat in 2 hours if necessary   aluminum-magnesium hydroxide-simethicone (MAALOX) 2070-3105-646 mg/30 mL suspension  Self No No   Sig: Take 30 mL by mouth every 4 (four) hours as needed for indigestion or heartburn (gas)   aspirin 81 mg chewable tablet  Self No No   Sig: Chew 1 tablet (81 mg total) daily Do not start before September 7, 2023.    atorvastatin (LIPITOR) 20 mg tablet  Self No No   Sig: Take 1 tablet (20 mg total) by mouth daily   clonazePAM (KlonoPIN) 0.5 mg tablet  Self No No   Sig: Take 1 tablet (0.5 mg total) by mouth daily as needed for anxiety   dicyclomine (BENTYL) 10 mg capsule  Self No No   Sig: Take 2 capsules (20 mg total) by mouth 3 (three) times a day as needed (Cramping)   docusate sodium (COLACE) 100 mg capsule  Self No No   Sig: Take 1 capsule (100 mg total) by mouth 2 (two) times a day for 14 days   docusate sodium (COLACE) 100 mg capsule  Self No No   Sig: Take 1 capsule (100 mg total) by mouth 2 (two) times a day   ergocalciferol (VITAMIN D2) 50,000 units  Self No No   Sig: TAKE 1 CAPSULE BY MOUTH 1 TIME A WEEK   escitalopram (LEXAPRO) 10 mg tablet  Self No No   Sig: TAKE 1 TABLET(10 MG) BY MOUTH DAILY   gabapentin (NEURONTIN) 800 mg tablet  Self No No   Sig: TAKE 1 TABLET BY MOUTH 3 TIMES  DAILY hydrochlorothiazide (HYDRODIURIL) 12.5 mg tablet  Self No No   Sig: TAKE 1 TABLET(12.5 MG) BY MOUTH DAILY   insulin glargine (LANTUS) 100 units/mL subcutaneous injection  Self No No   Sig: Inject 25 Units under the skin daily at bedtime   levothyroxine 25 mcg tablet  Self No No   Sig: TAKE 1 TABLET(25 MCG) BY MOUTH DAILY IN THE EARLY MORNING   linaCLOtide 145 MCG CAPS   No No   Sig: Take 1 capsule (145 mcg total) by mouth in the morning   metFORMIN (GLUCOPHAGE) 1000 MG tablet  Self No No   Sig: TAKE 1 TABLET(1000 MG) BY MOUTH TWICE DAILY WITH MEALS   metoprolol tartrate (LOPRESSOR) 50 mg tablet  Self No No   Sig: TAKE 1 TABLET BY MOUTH EVERY 12  HOURS   ondansetron (ZOFRAN) 4 mg tablet   No No   Sig: Take 1 tablet (4 mg total) by mouth every 8 (eight) hours as needed for nausea or vomiting   pantoprazole (PROTONIX) 40 mg tablet   No No   Sig: Take 1 tablet (40 mg total) by mouth 2 (two) times a day   polyethylene glycol (MIRALAX) 17 g packet  Self No No   Sig: Take 17 g by mouth daily   senna (SENOKOT) 8.6 mg  Self No No   Sig: Take 1 tablet (8.6 mg total) by mouth daily Do not start before October 20, 2023.    sucralfate (CARAFATE) 1 g tablet  Self No No   Sig: TAKE 1 TABLET BY MOUTH 4 TIMES  DAILY   traZODone (DESYREL) 100 mg tablet  Self No No   Sig: Take 2 tablets (200 mg total) by mouth daily at bedtime      Facility-Administered Medications: None       Past Medical History:   Diagnosis Date    Addiction to drug ([facility])     Adjustment disorder     Alcohol abuse     Alcoholism ([address])     Anxiety     Bipolar disorder (HCC)     Bowel obstruction (HCC)     Depression     Diabetes type 2, controlled ([address])     Disease of thyroid gland     Gastritis     Head injury     Heart palpitations     Hyperlipidemia     Hypertension     Hypothyroidism     Psychiatric illness     PTSD (post-traumatic stress disorder)     Seizure ([address])     Seizures ([address])     Sleep difficulties     Substance abuse ([address])     Suicide attempt ([address]) Withdrawal symptoms, drug or narcotic ([address])        Past Surgical History:   Procedure Laterality Date    ABDOMINAL SURGERY      APPENDECTOMY      BOWEL RESECTION      CHOLECYSTECTOMY      ESOPHAGOGASTRODUODENOSCOPY N/A 05/18/2018    Procedure: ESOPHAGOGASTRODUODENOSCOPY (EGD) with bx;  Surgeon: Nicolle Murphy DO;  Location: [facility]; Service: Gastroenterology    HYSTERECTOMY      KNEE SURGERY Bilateral 1991       Family History   Problem Relation Age of Onset    Bipolar disorder Mother     Cancer Father     Diabetes Father      I have reviewed and agree with the history as documented. E-Cigarette/Vaping    E-Cigarette Use Never User      E-Cigarette/Vaping Substances    Nicotine No     THC No     CBD No     Flavoring No     Other No     Unknown No      Social History     Tobacco Use    Smoking status: Every Day     Packs/day: 0.50     Years: 25.00     Total pack years: 12.50     Types: Cigarettes     Passive exposure: Current    Smokeless tobacco: Never    Tobacco comments:     smokes like 5 a day(06/24/2022)   Vaping Use    Vaping Use: Never used   Substance Use Topics    Alcohol use: Not Currently     Alcohol/week: 6.0 standard drinks of alcohol     Types: 6 Cans of beer per week     Comment: last drink on 08/15/20    Drug use: Never       Review of Systems   Gastrointestinal:  Positive for abdominal pain and nausea. All other systems reviewed and are negative. Physical Exam  Physical Exam  Vitals and nursing note reviewed. Constitutional:       General: She is in acute distress. Appearance: Normal appearance. She is obese. She is not ill-appearing. HENT:      Head: Normocephalic and atraumatic. Right Ear: External ear normal.      Left Ear: External ear normal.      Nose: Nose normal.      Mouth/Throat:      Mouth: Mucous membranes are moist.   Eyes:      General:         Right eye: No discharge. Left eye: No discharge.       Conjunctiva/sclera: Conjunctivae normal. Cardiovascular:      Rate and Rhythm: Normal rate and regular rhythm. Pulses: Normal pulses. Heart sounds: No murmur heard. Pulmonary:      Effort: Pulmonary effort is normal.      Breath sounds: Normal breath sounds. Abdominal:      General: Abdomen is flat. There is no distension. Tenderness: There is abdominal tenderness. There is no guarding or rebound. Comments: Epigastric > RUQ pain   Musculoskeletal:         General: Normal range of motion. Cervical back: Normal range of motion. Skin:     General: Skin is warm. Capillary Refill: Capillary refill takes less than 2 seconds. Findings: No rash. Neurological:      General: No focal deficit present. Mental Status: She is alert. Mental status is at baseline.    Psychiatric:         Mood and Affect: Mood normal.         Behavior: Behavior normal.         Vital Signs  ED Triage Vitals   Temperature Pulse Respirations Blood Pressure SpO2   11/02/23 1416 11/02/23 1416 11/02/23 1416 11/02/23 1416 11/02/23 1416   97.7 °F (36.5 °C) 99 22 (!) 178/93 98 %      Temp Source Heart Rate Source Patient Position - Orthostatic VS BP Location FiO2 (%)   11/02/23 1416 11/02/23 1416 11/02/23 1416 11/02/23 1416 --   Oral Monitor Lying Right arm       Pain Score       11/02/23 1451       10 - Worst Possible Pain           Vitals:    11/02/23 1416   BP: (!) 178/93   Pulse: 99   Patient Position - Orthostatic VS: Lying         Visual Acuity      ED Medications  Medications   sodium chloride 0.9 % bolus 1,000 mL (1,000 mL Intravenous New Bag 11/2/23 1451)   ketorolac (TORADOL) injection 15 mg (15 mg Intravenous Given 11/2/23 1455)   ondansetron (ZOFRAN) injection 4 mg (4 mg Intravenous Given 11/2/23 1451)   droperidol (INAPSINE) injection 1.25 mg (1.25 mg Intramuscular Given 11/2/23 1502)   iohexol (OMNIPAQUE) 350 MG/ML injection (SINGLE-DOSE) 100 mL (100 mL Intravenous Given 11/2/23 1515)   droperidol (INAPSINE) injection 1.25 mg (1.25 mg Intramuscular Given 11/2/23 1539)       Diagnostic Studies  Results Reviewed       Procedure Component Value Units Date/Time    Basic metabolic panel [[id number]] Collected: 11/02/23 1440    Lab Status: Final result Specimen: Blood from Arm, Left Updated: 11/02/23 1508     Sodium 138 mmol/L      Potassium 3.7 mmol/L      Chloride 100 mmol/L      CO2 30 mmol/L      ANION GAP 8 mmol/L      BUN 9 mg/dL      Creatinine 0.66 mg/dL      Glucose 100 mg/dL      Calcium 10.1 mg/dL      eGFR 99 ml/min/1.73sq m     Narrative:      [place] guidelines for Chronic Kidney Disease (CKD):     Stage 1 with normal or high GFR (GFR > 90 mL/min/1.73 square meters)    Stage 2 Mild CKD (GFR = 60-89 mL/min/1.73 square meters)    Stage 3A Moderate CKD (GFR = 45-59 mL/min/1.73 square meters)    Stage 3B Moderate CKD (GFR = 30-44 mL/min/1.73 square meters)    Stage 4 Severe CKD (GFR = 15-29 mL/min/1.73 square meters)    Stage 5 End Stage CKD (GFR <15 mL/min/1.73 square meters)  Note: GFR calculation is accurate only with a steady state creatinine    Hepatic function panel [[id number]]  (Normal) Collected: 11/02/23 1440    Lab Status: Final result Specimen: Blood from Arm, Left Updated: 11/02/23 1508     Total Bilirubin 0.40 mg/dL      Bilirubin, Direct 0.09 mg/dL      Alkaline Phosphatase 81 U/L      AST 21 U/L      ALT 19 U/L      Total Protein 8.3 g/dL      Albumin 4.6 g/dL     Lipase [[id number]]  (Normal) Collected: 11/02/23 1440    Lab Status: Final result Specimen: Blood from Arm, Left Updated: 11/02/23 1508     Lipase 12 u/L     CBC and differential [[id number]]  (Abnormal) Collected: 11/02/23 1440    Lab Status: Final result Specimen: Blood from Arm, Left Updated: 11/02/23 1452     WBC 11.49 Thousand/uL      RBC 4.74 Million/uL      Hemoglobin 13.6 g/dL      Hematocrit 42.3 %      MCV 89 fL      MCH 28.7 pg      MCHC 32.2 g/dL      RDW 13.0 %      MPV 9.5 fL      Platelets 542 Thousands/uL      nRBC 0 /100 WBCs Neutrophils Relative 61 %      Immat GRANS % 0 %      Lymphocytes Relative 32 %      Monocytes Relative 6 %      Eosinophils Relative 1 %      Basophils Relative 0 %      Neutrophils Absolute 6.90 Thousands/µL      Immature Grans Absolute 0.04 Thousand/uL      Lymphocytes Absolute 3.69 Thousands/µL      Monocytes Absolute 0.70 Thousand/µL      Eosinophils Absolute 0.11 Thousand/µL      Basophils Absolute 0.05 Thousands/µL                    CT abdomen pelvis with contrast   Final Result by Raj Garay MD (11/02 1609)      1. No acute abnormality in the abdomen or pelvis. 2.  Stable gastric antral thickening though this is of uncertain etiology as recent EGD was normal.            Workstation performed: [id number]                    Procedures  Procedures         ED Course                               SBIRT 20yo+      Flowsheet Row Most Recent Value   Initial Alcohol Screen: US AUDIT-C     1. How often do you have a drink containing alcohol? 0 Filed at: 11/02/2023 1415   2. How many drinks containing alcohol do you have on a typical day you are drinking? 0 Filed at: 11/02/2023 1415   3a. Male UNDER 65: How often do you have five or more drinks on one occasion? 0 Filed at: 11/02/2023 1415   3b. FEMALE Any Age, or MALE 65+: How often do you have 4 or more drinks on one occassion? 0 Filed at: 11/02/2023 1415   Audit-C Score 0 Filed at: 11/02/2023 1415   MUMTAZ: How many times in the past year have you. .. Used an illegal drug or used a prescription medication for non-medical reasons? Never Filed at: 11/02/2023 1415                      Medical Decision Making  Patient with abdominal pain, nausea. Will evaluate for acute intra-abdominal processes including but not limited to bowel obstruction, gastritis, diabetic gastroparesis, peptic ulcer, colitis, diverticulitis, biliary disease. Blood work with mild leukocytosis and thrombocytosis. Nonspecific. CT scan without significant findings.     We will discharge home. Follow-up with gastroenterology. Return precautions discussed. Problems Addressed:  Abdominal pain: acute illness or injury  Gastric wall thickening: chronic illness or injury    Amount and/or Complexity of Data Reviewed  Labs: ordered. Radiology: ordered. Risk  Prescription drug management.    :         Disposition  Final diagnoses:   Abdominal pain   Gastric wall thickening     Time reflects when diagnosis was documented in both MDM as applicable and the Disposition within this note       Time User Action Codes Description Comment    11/2/2023  4:14 PM Valery Goodrich [R10.9] Abdominal pain     11/2/2023  4:14 PM Robina Galan Odell [K31.89] Gastric wall thickening           ED Disposition       ED Disposition   Discharge    Condition   Stable    Date/Time   Thu Nov 2, 2023 1614    [address] [facility] discharge to home/self care. Follow-up Information       Follow up With Specialties Details Why Contact Info Additional Information    [facility] Gastroenterology [facility] Gastroenterology Schedule an appointment as soon as possible for a visit  For re-evaluation as soon as possible [address] Tomy Chopra [address] [place] [id number] [phone] [facility] Gastroenterology [facility] [address] [facility], [address] [place] [address], [address] [place] [address] 190    [address] Emergency Department Emergency Medicine  If symptoms worsen [address] [phone]  [address] [facility] Emergency Department, [address] [place] [address] , [address] [place] [address] Pkwy            Patient's Medications   Discharge Prescriptions    No medications on file       No discharge procedures on file.     PDMP Review         Value Time User    PDMP Reviewed  Yes 7/12/2023  4:04 PM Orville Joel, [address] [place] [address]            ED Provider  Electronically Signed by             Holland Oliveira DO  11/02/23 8797

## 2023-11-03 ENCOUNTER — TELEPHONE (OUTPATIENT)
Dept: GASTROENTEROLOGY | Facility: CLINIC | Age: 58
End: 2023-11-03

## 2023-11-03 ENCOUNTER — PATIENT OUTREACH (OUTPATIENT)
Dept: CASE MANAGEMENT | Facility: OTHER | Age: 58
End: 2023-11-03

## 2023-11-03 NOTE — TELEPHONE ENCOUNTER
Received fax from [employer] for prior auth for gimoti. Key [id number]. Fax will be scanned to [employer].

## 2023-11-03 NOTE — TELEPHONE ENCOUNTER
Called [facility] no auth needed. They have the medication ready for her with zero copay. Patient notified via phone.

## 2023-11-06 ENCOUNTER — TELEPHONE (OUTPATIENT)
Age: 58
End: 2023-11-06

## 2023-11-06 NOTE — TELEPHONE ENCOUNTER
Patients GI provider:  Cammie Wilkins     Number to return call: [phone]    Reason for call: Pt's significant other Shabbir Enriquez was calling in regard to referral.     Scheduled procedure/appointment date if applicable: 4/9/8973

## 2023-11-06 NOTE — TELEPHONE ENCOUNTER
Approved  [id number]  Prior authorization approved   Case ID: [id number]      Payer: [employer] Part D    [phone]    [phone]   Request Reference Number: [id number]. GIMOTI       SPR 15MG is approved through 12/31/2024. Your patient may now fill this prescription and it will be covered. Approval Details    Authorization number: [id number]   Authorized from November 3, 2023 to December 31, 2024      Electronic appeal: Not supported   View History      Notes     Time User Attachment    Attachment received from payer.  11/3/2023  3:43 PM Interface, [employer] Rta In Electronic Prior Authorization Attachment - Document      Medication Being Authorized     Metoclopramide HCl (Gimoti) 15 MG/ACT SOLN        Approval scanned in media  Faxed to pharmacy where med was sent  Advised pt via [employer]

## 2023-11-08 ENCOUNTER — TELEPHONE (OUTPATIENT)
Age: 58
End: 2023-11-08

## 2023-11-08 ENCOUNTER — VBI (OUTPATIENT)
Dept: ADMINISTRATIVE | Facility: OTHER | Age: 58
End: 2023-11-08

## 2023-11-08 DIAGNOSIS — K31.84 DIABETIC GASTROPARESIS: Primary | ICD-10-CM

## 2023-11-08 DIAGNOSIS — R10.10 PAIN OF UPPER ABDOMEN: ICD-10-CM

## 2023-11-08 DIAGNOSIS — E11.43 DIABETIC GASTROPARESIS: Primary | ICD-10-CM

## 2023-11-08 NOTE — TELEPHONE ENCOUNTER
Pts s/o Marylou Esparza calling in, he called [facility] for pts gastroparesis and they are asking what procedure pt was referred for I review notes and advised it was for AYSHA alberts he understood, no further questions.

## 2023-11-09 ENCOUNTER — PATIENT OUTREACH (OUTPATIENT)
Dept: CASE MANAGEMENT | Facility: OTHER | Age: 58
End: 2023-11-09

## 2023-11-09 DIAGNOSIS — K31.84 GASTROPARESIS: Primary | ICD-10-CM

## 2023-11-09 DIAGNOSIS — F31.4 BIPOLAR DISORDER, CURRENT EPISODE DEPRESSED, SEVERE, WITHOUT PSYCHOTIC FEATURES (HCC): ICD-10-CM

## 2023-11-09 RX ORDER — CLONAZEPAM 0.5 MG/1
0.5 TABLET ORAL DAILY PRN
Qty: 30 TABLET | Refills: 0 | Status: SHIPPED | OUTPATIENT
Start: 2023-11-09

## 2023-11-09 RX ORDER — METOCLOPRAMIDE HYDROCHLORIDE 15 MG/.07ML
15 SPRAY NASAL
Qty: 9.8 ML | Refills: 0 | Status: SHIPPED | OUTPATIENT
Start: 2023-11-09 | End: 2023-11-19

## 2023-11-09 NOTE — TELEPHONE ENCOUNTER
Please call and inform patient that referral was placed for [facility] with Dr.A. Zavaleta Phone [phone] for AYSHA Bae.  Thank you

## 2023-11-09 NOTE — PROGRESS NOTES
I spoke with patient who reports she started the Lacey Deaner and did have some symptom improvement today. She tried calling [facility] and spent hours finding a Gastroenterologist without a referral.She is anxious and tearful.  I advised her I will speak with someone at the GI office and ask for a referral.

## 2023-11-09 NOTE — TELEPHONE ENCOUNTER
[facility] care manager sent message - [facility] for AYSHA alberts but insurance is out of network. Please place a referral for [facility] instead.  Thank you

## 2023-11-09 NOTE — PROGRESS NOTES
I sent a tiger text to MCKAY Garcias explaining patient;s issues. She replied she will follow up. I thanked her and asked her to let me know if I can help.

## 2023-11-10 NOTE — TELEPHONE ENCOUNTER
Left message with referral information including place,  name of doctor, phone number and reason for referral.

## 2023-11-10 NOTE — TELEPHONE ENCOUNTER
Left message regarding referral including place, doctors name, phone number and reason for referral.

## 2023-11-13 NOTE — TELEPHONE ENCOUNTER
Pt w/ hx of gastroparesis called in to explain that her s/o contacted [facility] and was told more information is needed from our office. He stated that [facility] is requesting that our referral include that 'pt needs gpoem'. Pt also expressed being in severe pain chronically and requested referral to pain management. I explained I would have our office reach out to [facility] for further information. I stated I would discuss with provider about referral to pain management. We discussed gastroparesis diet and I advised pt be seen in ED if symptoms become severe.

## 2023-11-13 NOTE — TELEPHONE ENCOUNTER
Please update referral for [facility] to include that patient is being referred for AYSHA Bae.  Thank you

## 2023-11-14 ENCOUNTER — TELEPHONE (OUTPATIENT)
Dept: GASTROENTEROLOGY | Facility: CLINIC | Age: 58
End: 2023-11-14

## 2023-11-14 ENCOUNTER — HOSPITAL ENCOUNTER (EMERGENCY)
Facility: HOSPITAL | Age: 58
Discharge: HOME | End: 2023-11-14
Attending: EMERGENCY MEDICINE
Payer: COMMERCIAL

## 2023-11-14 ENCOUNTER — APPOINTMENT (EMERGENCY)
Dept: CT IMAGING | Facility: HOSPITAL | Age: 58
End: 2023-11-14
Payer: COMMERCIAL

## 2023-11-14 ENCOUNTER — PATIENT OUTREACH (OUTPATIENT)
Dept: CASE MANAGEMENT | Facility: OTHER | Age: 58
End: 2023-11-14

## 2023-11-14 VITALS
DIASTOLIC BLOOD PRESSURE: 66 MMHG | HEART RATE: 78 BPM | TEMPERATURE: 98.1 F | BODY MASS INDEX: 28.7 KG/M2 | HEIGHT: 63 IN | SYSTOLIC BLOOD PRESSURE: 137 MMHG | OXYGEN SATURATION: 94 % | WEIGHT: 162 LBS | RESPIRATION RATE: 18 BRPM

## 2023-11-14 DIAGNOSIS — K31.84 DIABETIC GASTROPARESIS: ICD-10-CM

## 2023-11-14 DIAGNOSIS — R10.9 ABDOMINAL PAIN: Primary | ICD-10-CM

## 2023-11-14 DIAGNOSIS — E11.43 DIABETIC GASTROPARESIS: ICD-10-CM

## 2023-11-14 LAB
ALBUMIN SERPL BCP-MCNC: 4.4 G/DL (ref 3.5–5)
ALP SERPL-CCNC: 90 U/L (ref 34–104)
ALT SERPL W P-5'-P-CCNC: 17 U/L (ref 7–52)
ANION GAP SERPL CALCULATED.3IONS-SCNC: 9 MMOL/L
APTT PPP: 29 SECONDS (ref 23–37)
AST SERPL W P-5'-P-CCNC: 18 U/L (ref 13–39)
BASOPHILS # BLD AUTO: 0.03 THOUSANDS/ÂΜL (ref 0–0.1)
BASOPHILS NFR BLD AUTO: 0 % (ref 0–1)
BILIRUB SERPL-MCNC: 0.31 MG/DL (ref 0.2–1)
BILIRUB UR QL STRIP: NEGATIVE
BUN SERPL-MCNC: 12 MG/DL (ref 5–25)
CALCIUM SERPL-MCNC: 9.8 MG/DL (ref 8.4–10.2)
CHLORIDE SERPL-SCNC: 100 MMOL/L (ref 96–108)
CLARITY UR: CLEAR
CO2 SERPL-SCNC: 30 MMOL/L (ref 21–32)
COLOR UR: ABNORMAL
CREAT SERPL-MCNC: 0.82 MG/DL (ref 0.6–1.3)
EOSINOPHIL # BLD AUTO: 0.12 THOUSAND/ÂΜL (ref 0–0.61)
EOSINOPHIL NFR BLD AUTO: 1 % (ref 0–6)
ERYTHROCYTE [DISTWIDTH] IN BLOOD BY AUTOMATED COUNT: 13 % (ref 11.6–15.1)
GFR SERPL CREATININE-BSD FRML MDRD: 80 ML/MIN/1.73SQ M
GLUCOSE SERPL-MCNC: 136 MG/DL (ref 65–140)
GLUCOSE UR STRIP-MCNC: NEGATIVE MG/DL
HCT VFR BLD AUTO: 39.3 % (ref 34.8–46.1)
HGB BLD-MCNC: 12.8 G/DL (ref 11.5–15.4)
HGB UR QL STRIP.AUTO: NEGATIVE
IMM GRANULOCYTES # BLD AUTO: 0.03 THOUSAND/UL (ref 0–0.2)
IMM GRANULOCYTES NFR BLD AUTO: 0 % (ref 0–2)
INR PPP: 0.86 (ref 0.84–1.19)
KETONES UR STRIP-MCNC: NEGATIVE MG/DL
LACTATE SERPL-SCNC: 1.8 MMOL/L (ref 0.5–2)
LEUKOCYTE ESTERASE UR QL STRIP: NEGATIVE
LIPASE SERPL-CCNC: 20 U/L (ref 11–82)
LYMPHOCYTES # BLD AUTO: 2.64 THOUSANDS/ÂΜL (ref 0.6–4.47)
LYMPHOCYTES NFR BLD AUTO: 25 % (ref 14–44)
MCH RBC QN AUTO: 28.8 PG (ref 26.8–34.3)
MCHC RBC AUTO-ENTMCNC: 32.6 G/DL (ref 31.4–37.4)
MCV RBC AUTO: 89 FL (ref 82–98)
MONOCYTES # BLD AUTO: 0.78 THOUSAND/ÂΜL (ref 0.17–1.22)
MONOCYTES NFR BLD AUTO: 7 % (ref 4–12)
NEUTROPHILS # BLD AUTO: 6.99 THOUSANDS/ÂΜL (ref 1.85–7.62)
NEUTS SEG NFR BLD AUTO: 67 % (ref 43–75)
NITRITE UR QL STRIP: NEGATIVE
NRBC BLD AUTO-RTO: 0 /100 WBCS
PH UR STRIP.AUTO: 6 [PH]
PLATELET # BLD AUTO: 351 THOUSANDS/UL (ref 149–390)
PMV BLD AUTO: 10.1 FL (ref 8.9–12.7)
POTASSIUM SERPL-SCNC: 3.6 MMOL/L (ref 3.5–5.3)
PROCALCITONIN SERPL-MCNC: <0.05 NG/ML
PROT SERPL-MCNC: 7.7 G/DL (ref 6.4–8.4)
PROT UR STRIP-MCNC: NEGATIVE MG/DL
PROTHROMBIN TIME: 11.6 SECONDS (ref 11.6–14.5)
RBC # BLD AUTO: 4.44 MILLION/UL (ref 3.81–5.12)
SODIUM SERPL-SCNC: 139 MMOL/L (ref 135–147)
SP GR UR STRIP.AUTO: <=1.005 (ref 1–1.03)
UROBILINOGEN UR QL STRIP.AUTO: 0.2 E.U./DL
WBC # BLD AUTO: 10.59 THOUSAND/UL (ref 4.31–10.16)

## 2023-11-14 PROCEDURE — 83605 ASSAY OF LACTIC ACID: CPT | Performed by: EMERGENCY MEDICINE

## 2023-11-14 PROCEDURE — 74177 CT ABD & PELVIS W/CONTRAST: CPT

## 2023-11-14 PROCEDURE — 80053 COMPREHEN METABOLIC PANEL: CPT | Performed by: EMERGENCY MEDICINE

## 2023-11-14 PROCEDURE — 85730 THROMBOPLASTIN TIME PARTIAL: CPT | Performed by: EMERGENCY MEDICINE

## 2023-11-14 PROCEDURE — 96374 THER/PROPH/DIAG INJ IV PUSH: CPT

## 2023-11-14 PROCEDURE — 96375 TX/PRO/DX INJ NEW DRUG ADDON: CPT

## 2023-11-14 PROCEDURE — 85025 COMPLETE CBC W/AUTO DIFF WBC: CPT | Performed by: EMERGENCY MEDICINE

## 2023-11-14 PROCEDURE — 87040 BLOOD CULTURE FOR BACTERIA: CPT | Performed by: EMERGENCY MEDICINE

## 2023-11-14 PROCEDURE — 96361 HYDRATE IV INFUSION ADD-ON: CPT

## 2023-11-14 PROCEDURE — G1004 CDSM NDSC: HCPCS

## 2023-11-14 PROCEDURE — 99285 EMERGENCY DEPT VISIT HI MDM: CPT | Performed by: EMERGENCY MEDICINE

## 2023-11-14 PROCEDURE — 85610 PROTHROMBIN TIME: CPT | Performed by: EMERGENCY MEDICINE

## 2023-11-14 PROCEDURE — 93005 ELECTROCARDIOGRAM TRACING: CPT

## 2023-11-14 PROCEDURE — 83690 ASSAY OF LIPASE: CPT | Performed by: EMERGENCY MEDICINE

## 2023-11-14 PROCEDURE — 99284 EMERGENCY DEPT VISIT MOD MDM: CPT

## 2023-11-14 PROCEDURE — 81003 URINALYSIS AUTO W/O SCOPE: CPT | Performed by: EMERGENCY MEDICINE

## 2023-11-14 PROCEDURE — 84145 PROCALCITONIN (PCT): CPT | Performed by: EMERGENCY MEDICINE

## 2023-11-14 PROCEDURE — 36415 COLL VENOUS BLD VENIPUNCTURE: CPT | Performed by: EMERGENCY MEDICINE

## 2023-11-14 RX ORDER — KETOROLAC TROMETHAMINE 30 MG/ML
15 INJECTION, SOLUTION INTRAMUSCULAR; INTRAVENOUS ONCE
Status: COMPLETED | OUTPATIENT
Start: 2023-11-14 | End: 2023-11-14

## 2023-11-14 RX ORDER — DROPERIDOL 2.5 MG/ML
1.25 INJECTION, SOLUTION INTRAMUSCULAR; INTRAVENOUS ONCE
Status: COMPLETED | OUTPATIENT
Start: 2023-11-14 | End: 2023-11-14

## 2023-11-14 RX ADMIN — DROPERIDOL 1.25 MG: 2.5 INJECTION, SOLUTION INTRAMUSCULAR; INTRAVENOUS at 08:20

## 2023-11-14 RX ADMIN — KETOROLAC TROMETHAMINE 15 MG: 30 INJECTION, SOLUTION INTRAMUSCULAR at 08:11

## 2023-11-14 RX ADMIN — SODIUM CHLORIDE 1000 ML: 0.9 INJECTION, SOLUTION INTRAVENOUS at 08:10

## 2023-11-14 RX ADMIN — IOHEXOL 100 ML: 350 INJECTION, SOLUTION INTRAVENOUS at 09:17

## 2023-11-14 NOTE — DISCHARGE INSTRUCTIONS
Follow-up with your gastroenterologist as soon as possible for continued symptoms. Return to the emergency department for new or worsening symptoms.

## 2023-11-14 NOTE — ED PROVIDER NOTES
History  Chief Complaint   Patient presents with    Abdominal Pain     Pt states hx of gastroparesis with constant abdominal pain, starting having severe pain yesterday outside of her normal pain. abdominal swelling and severe nausea starting this morning. States she took her prescribed medications yesterday without relief. 53-year-old female with history of diabetes, gastroparesis, generalized anxiety disorder, depression and bipolar disorder presents to the emergency department for evaluation of abdominal pain. The patient reports that she is had similar pain for the past 8 months. Reports that she was told that it is related to her gastroparesis. She states that she follows up with Dr. Pete Wilhelm. She reports worsening of her constant abdominal pain over the past 2 days. States that she was taking medications that were prescribed to her with only minimal relief. She states that she called her gastroenterologist who recommended that she come to the emergency department for further evaluation. She did not take any medications today to treat her symptoms. She describes her pain as sharp epigastric and right upper quadrant. She reports associated nausea without vomiting. She denies fever, chills, diarrhea, sick contacts, recent travel, chest pain, shortness of breath or localized numbness, tingling or weakness. Prior to Admission Medications   Prescriptions Last Dose Informant Patient Reported? Taking? ARIPiprazole (ABILIFY) 10 mg tablet  Self No No   Sig: Take 1 tablet (10 mg total) by mouth daily   Alcohol Swabs 70 % PADS  Self No No   Sig: May substitute brand based on insurance coverage. Check glucose TID. Blood Glucose Monitoring Suppl (OneTouch Verio Reflect) w/Device KIT  Self No No   Sig: May substitute brand based on insurance coverage. Check glucose TID.    Metoclopramide HCl (Gimoti) 15 MG/ACT SOLN   No No   Sig: 15 mg by Right Nare route 3 (three) times a day before meals for 30 doses   Microlet Lancets MISC  Self Yes No   OneTouch Delica Lancets 46M MISC  Self No No   Sig: May substitute brand based on insurance coverage. Check glucose TID. SUMAtriptan (IMITREX) 50 mg tablet  Self No No   Sig: Take 1 tablet (50 mg total) by mouth once as needed for migraine for up to 1 dose may repeat in 2 hours if necessary   aluminum-magnesium hydroxide-simethicone (MAALOX) 9479-4412-394 mg/30 mL suspension  Self No No   Sig: Take 30 mL by mouth every 4 (four) hours as needed for indigestion or heartburn (gas)   aspirin 81 mg chewable tablet  Self No No   Sig: Chew 1 tablet (81 mg total) daily Do not start before September 7, 2023.    atorvastatin (LIPITOR) 20 mg tablet  Self No No   Sig: Take 1 tablet (20 mg total) by mouth daily   clonazePAM (KlonoPIN) 0.5 mg tablet   No No   Sig: Take 1 tablet (0.5 mg total) by mouth daily as needed for anxiety   dicyclomine (BENTYL) 10 mg capsule  Self No No   Sig: Take 2 capsules (20 mg total) by mouth 3 (three) times a day as needed (Cramping)   docusate sodium (COLACE) 100 mg capsule  Self No No   Sig: Take 1 capsule (100 mg total) by mouth 2 (two) times a day for 14 days   docusate sodium (COLACE) 100 mg capsule  Self No No   Sig: Take 1 capsule (100 mg total) by mouth 2 (two) times a day   ergocalciferol (VITAMIN D2) 50,000 units  Self No No   Sig: TAKE 1 CAPSULE BY MOUTH 1 TIME A WEEK   escitalopram (LEXAPRO) 10 mg tablet  Self No No   Sig: TAKE 1 TABLET(10 MG) BY MOUTH DAILY   gabapentin (NEURONTIN) 800 mg tablet  Self No No   Sig: TAKE 1 TABLET BY MOUTH 3 TIMES  DAILY   hydrochlorothiazide (HYDRODIURIL) 12.5 mg tablet  Self No No   Sig: TAKE 1 TABLET(12.5 MG) BY MOUTH DAILY   insulin glargine (LANTUS) 100 units/mL subcutaneous injection  Self No No   Sig: Inject 25 Units under the skin daily at bedtime   levothyroxine 25 mcg tablet  Self No No   Sig: TAKE 1 TABLET(25 MCG) BY MOUTH DAILY IN THE EARLY MORNING   linaCLOtide 145 MCG CAPS   No No   Sig: Take 1 capsule (145 mcg total) by mouth in the morning   metFORMIN (GLUCOPHAGE) 1000 MG tablet  Self No No   Sig: TAKE 1 TABLET(1000 MG) BY MOUTH TWICE DAILY WITH MEALS   metoprolol tartrate (LOPRESSOR) 50 mg tablet  Self No No   Sig: TAKE 1 TABLET BY MOUTH EVERY 12  HOURS   ondansetron (ZOFRAN) 4 mg tablet   No No   Sig: Take 1 tablet (4 mg total) by mouth every 8 (eight) hours as needed for nausea or vomiting   pantoprazole (PROTONIX) 40 mg tablet   No No   Sig: Take 1 tablet (40 mg total) by mouth 2 (two) times a day   polyethylene glycol (MIRALAX) 17 g packet  Self No No   Sig: Take 17 g by mouth daily   senna (SENOKOT) 8.6 mg  Self No No   Sig: Take 1 tablet (8.6 mg total) by mouth daily Do not start before October 20, 2023. sucralfate (CARAFATE) 1 g tablet  Self No No   Sig: TAKE 1 TABLET BY MOUTH 4 TIMES  DAILY   traZODone (DESYREL) 100 mg tablet  Self No No   Sig: Take 2 tablets (200 mg total) by mouth daily at bedtime      Facility-Administered Medications: None       Past Medical History:   Diagnosis Date    Addiction to drug ([address])     Adjustment disorder     Alcohol abuse     Alcoholism ([address])     Anxiety     Bipolar disorder (HCC)     Bowel obstruction ([address])     Depression     Diabetes type 2, controlled ([address])     Disease of thyroid gland     Gastritis     Head injury     Heart palpitations     Hyperlipidemia     Hypertension     Hypothyroidism     Psychiatric illness     PTSD (post-traumatic stress disorder)     Seizure ([address])     Seizures ([address])     Sleep difficulties     Substance abuse ([address])     Suicide attempt ([address])     Withdrawal symptoms, drug or narcotic ([address])        Past Surgical History:   Procedure Laterality Date    ABDOMINAL SURGERY      APPENDECTOMY      BOWEL RESECTION      CHOLECYSTECTOMY      ESOPHAGOGASTRODUODENOSCOPY N/A 05/18/2018    Procedure: ESOPHAGOGASTRODUODENOSCOPY (EGD) with bx;  Surgeon: Mikel Anaya DO;  Location: [facility];   Service: Gastroenterology    HYSTERECTOMY      KNEE SURGERY Bilateral 1991       Family History   Problem Relation Age of Onset    Bipolar disorder Mother     Cancer Father     Diabetes Father      I have reviewed and agree with the history as documented. E-Cigarette/Vaping    E-Cigarette Use Never User      E-Cigarette/Vaping Substances    Nicotine No     THC No     CBD No     Flavoring No     Other No     Unknown No      Social History     Tobacco Use    Smoking status: Every Day     Packs/day: 1.00     Years: 25.00     Total pack years: 25.00     Types: Cigarettes     Passive exposure: Current    Smokeless tobacco: Never    Tobacco comments:     smokes like 5 a day(06/24/2022)   Vaping Use    Vaping Use: Never used   Substance Use Topics    Alcohol use: Not Currently     Alcohol/week: 6.0 standard drinks of alcohol     Types: 6 Cans of beer per week     Comment: last drink on 08/15/20    Drug use: Never       Review of Systems   Constitutional:  Negative for chills and fever. HENT:  Negative for ear pain and sore throat. Eyes:  Negative for pain and visual disturbance. Respiratory:  Negative for cough and shortness of breath. Cardiovascular:  Negative for chest pain and palpitations. Gastrointestinal:  Positive for abdominal pain and nausea. Negative for vomiting. Genitourinary:  Negative for dysuria and hematuria. Musculoskeletal:  Negative for arthralgias and back pain. Skin:  Negative for color change and rash. Neurological:  Negative for seizures and syncope. All other systems reviewed and are negative. Physical Exam  Physical Exam  Vitals and nursing note reviewed. Constitutional:       General: She is in acute distress. Appearance: She is well-developed. She is obese. HENT:      Head: Normocephalic and atraumatic. Eyes:      Conjunctiva/sclera: Conjunctivae normal.   Cardiovascular:      Rate and Rhythm: Normal rate and regular rhythm. Heart sounds: No murmur heard.   Pulmonary:      Effort: Pulmonary effort is normal. No respiratory distress. Breath sounds: Normal breath sounds. Abdominal:      Palpations: Abdomen is soft. Tenderness: There is abdominal tenderness in the epigastric area. There is no guarding or rebound. Musculoskeletal:         General: No swelling. Cervical back: Neck supple. Skin:     General: Skin is warm and dry. Capillary Refill: Capillary refill takes less than 2 seconds. Neurological:      Mental Status: She is alert. Psychiatric:         Attention and Perception: She does not perceive auditory or visual hallucinations. Mood and Affect: Mood is anxious. Affect is tearful. Thought Content: Thought content does not include homicidal or suicidal ideation. Thought content does not include homicidal or suicidal plan.          Vital Signs  ED Triage Vitals [11/14/23 0731]   Temperature Pulse Respirations Blood Pressure SpO2   98.1 °F (36.7 °C) (!) 126 (!) 24 (!) 170/110 99 %      Temp Source Heart Rate Source Patient Position - Orthostatic VS BP Location FiO2 (%)   Oral Monitor Sitting Left arm --      Pain Score       10 - Worst Possible Pain           Vitals:    11/14/23 0800 11/14/23 0815 11/14/23 0845 11/14/23 1004   BP:  157/81 142/66 137/66   Pulse: 95 84 75 78   Patient Position - Orthostatic VS:   Lying Lying         Visual Acuity      ED Medications  Medications   sodium chloride 0.9 % bolus 1,000 mL (0 mL Intravenous Stopped 11/14/23 1018)   ketorolac (TORADOL) injection 15 mg (15 mg Intravenous Given 11/14/23 0811)   droperidol (INAPSINE) injection 1.25 mg (1.25 mg Intravenous Given 11/14/23 0820)   iohexol (OMNIPAQUE) 350 MG/ML injection (SINGLE-DOSE) 100 mL (100 mL Intravenous Given 11/14/23 0917)       Diagnostic Studies  Results Reviewed       Procedure Component Value Units Date/Time    Lipase [[id number]]  (Normal) Collected: 11/14/23 0813    Lab Status: Final result Specimen: Blood from Arm, Left Updated: 11/14/23 0940     Lipase 20 u/L     UA w Reflex to Microscopic w Reflex to Culture [[id number]]  (Abnormal) Collected: 11/14/23 0908    Lab Status: Final result Specimen: Urine, Clean Catch Updated: 11/14/23 0921     Color, UA Straw     Clarity, UA Clear     Specific Gravity, UA <=1.005     pH, UA 6.0     Leukocytes, UA Negative     Nitrite, UA Negative     Protein, UA Negative mg/dl      Glucose, UA Negative mg/dl      Ketones, UA Negative mg/dl      Urobilinogen, UA 0.2 E.U./dl      Bilirubin, UA Negative     Occult Blood, UA Negative    Procalcitonin [[id number]]  (Normal) Collected: 11/14/23 0813    Lab Status: Final result Specimen: Blood from Arm, Left Updated: 11/14/23 0852     Procalcitonin <0.05 ng/ml     Comprehensive metabolic panel [[id number]] Collected: 11/14/23 0813    Lab Status: Final result Specimen: Blood from Arm, Left Updated: 11/14/23 0844     Sodium 139 mmol/L      Potassium 3.6 mmol/L      Chloride 100 mmol/L      CO2 30 mmol/L      ANION GAP 9 mmol/L      BUN 12 mg/dL      Creatinine 0.82 mg/dL      Glucose 136 mg/dL      Calcium 9.8 mg/dL      AST 18 U/L      ALT 17 U/L      Alkaline Phosphatase 90 U/L      Total Protein 7.7 g/dL      Albumin 4.4 g/dL      Total Bilirubin 0.31 mg/dL      eGFR 80 ml/min/1.73sq m     Narrative:      [facility] (CKD):     Stage 1 with normal or high GFR (GFR > 90 mL/min/1.73 square meters)    Stage 2 Mild CKD (GFR = 60-89 mL/min/1.73 square meters)    Stage 3A Moderate CKD (GFR = 45-59 mL/min/1.73 square meters)    Stage 3B Moderate CKD (GFR = 30-44 mL/min/1.73 square meters)    Stage 4 Severe CKD (GFR = 15-29 mL/min/1.73 square meters)    Stage 5 End Stage CKD (GFR <15 mL/min/1.73 square meters)  Note: GFR calculation is accurate only with a steady state creatinine    Lactic acid [[id number]]  (Normal) Collected: 11/14/23 0813    Lab Status: Final result Specimen: Blood from Arm, Left Updated: 11/14/23 0841     LACTIC ACID 1.8 mmol/L Narrative:      Result may be elevated if tourniquet was used during collection. Blood culture #1 [[id number]] Collected: 11/14/23 0832    Lab Status: In process Specimen: Blood from Arm, Right Updated: 11/14/23 0837    Protime-INR [[id number]]  (Normal) Collected: 11/14/23 0813    Lab Status: Final result Specimen: Blood from Arm, Left Updated: 11/14/23 0836     Protime 11.6 seconds      INR 0.86    APTT [[id number]]  (Normal) Collected: 11/14/23 0813    Lab Status: Final result Specimen: Blood from Arm, Left Updated: 11/14/23 0836     PTT 29 seconds     CBC and differential [[id number]]  (Abnormal) Collected: 11/14/23 0813    Lab Status: Final result Specimen: Blood from Arm, Left Updated: 11/14/23 0823     WBC 10.59 Thousand/uL      RBC 4.44 Million/uL      Hemoglobin 12.8 g/dL      Hematocrit 39.3 %      MCV 89 fL      MCH 28.8 pg      MCHC 32.6 g/dL      RDW 13.0 %      MPV 10.1 fL      Platelets 693 Thousands/uL      nRBC 0 /100 WBCs      Neutrophils Relative 67 %      Immat GRANS % 0 %      Lymphocytes Relative 25 %      Monocytes Relative 7 %      Eosinophils Relative 1 %      Basophils Relative 0 %      Neutrophils Absolute 6.99 Thousands/µL      Immature Grans Absolute 0.03 Thousand/uL      Lymphocytes Absolute 2.64 Thousands/µL      Monocytes Absolute 0.78 Thousand/µL      Eosinophils Absolute 0.12 Thousand/µL      Basophils Absolute 0.03 Thousands/µL     Blood culture #2 [[id number]] Collected: 11/14/23 0813    Lab Status: In process Specimen: Blood from Arm, Left Updated: 11/14/23 0820                   CT abdomen pelvis with contrast   Final Result by Shweta Royal MD (11/14 0931)      No acute intra-abdominal abnormality. Previously identified gastric antral thickening appears to have improved.             Workstation performed: [id number]                    Procedures  ECG 12 Lead Documentation Only    Date/Time: 11/14/2023 7:45 AM    Performed by: Gaston Bello MD  Authorized by: Harleen Kirk Carolyn Bowling MD    ECG reviewed by me, the ED Provider: yes    Patient location:  ED  Previous ECG:     Previous ECG:  Compared to current    Similarity:  Changes noted    Comparison to cardiac monitor: Yes    Interpretation:     Interpretation: abnormal    Rate:     ECG rate assessment: tachycardic    Rhythm:     Rhythm: sinus tachycardia    Ectopy:     Ectopy: none    QRS:     QRS axis:  Normal  Conduction:     Conduction: normal    ST segments:     ST segments:  Normal  T waves:     T waves: normal             ED Course  ED Course as of 11/14/23 1026   Tue Nov 14, 2023   0842 WBC(!): 10.59  Improved from 12 days ago   0902 LACTIC ACID: 1.8   0903 Procalcitonin: <0.05   0935 CT abdomen pelvis with contrast  No acute intra-abdominal abnormality. Previously identified gastric antral thickening appears to have improved. 2385 Patient reevaluated. Resting comfortably. Vital signs normalized. Reports some improvement with the medications. All diagnostic studies discussed with the patient in detail. Recommendation made for the patient to follow-up with her gastroenterologist.  Return precautions were discussed. Initial Sepsis Screening       Row Name 11/14/23 0949                Is the patient's history suggestive of a new or worsening infection? No  -KF                  User Key  (r) = Recorded By, (t) = Taken By, (c) = Cosigned By      [address] [place] [address] Name Provider Type    KF Deanna Chandler MD Physician                    SBIRT 22yo+      Flowsheet Row Most Recent Value   Initial Alcohol Screen: US AUDIT-C     1. How often do you have a drink containing alcohol? 0 Filed at: 11/14/2023 0733   Audit-C Score 0 Filed at: 11/14/2023 9623   MUMTAZ: How many times in the past year have you. .. Used an illegal drug or used a prescription medication for non-medical reasons?  Never Filed at: 11/14/2023 7751                      Medical Decision Making  61-year-old female presented to the emergency department for evaluation of abdominal pain. Arrival the patient was awake, alert, oriented and in mild acute distress. Initial vital signs show that the patient was tachycardic and tachypneic. On exam the patient had epigastric tenderness to palpation. Physical exam was otherwise unremarkable. Prior notes were reviewed. Recommendation from gastroenterology to avoid narcotics. Patient was treated with a dose of droperidol when she was seen here in the emergency department 2 weeks ago. She states that she did have some improvement after the medication was given and was agreeable for a dose today. EKG ordered to evaluate for acute ischemia/arrhythmia. On my interpretation EKG with a sinus rhythm with no acute ischemic changes noted. Septic work-up was initiated at the patient met SIRS criteria. Lactic acid, procalcitonin within normal limits. White blood cell count improved from previous. Urinalysis is not consistent with a urinary tract infection. CT scan of the patient's abdomen and pelvis was ordered to evaluate for acute abdominal pathology including but not limited to pancreatitis, infection, abscess, perforation, obstruction. CT scan with no acute findings. Patient has no source of infection. She was treated symptomatically with a fluid bolus, Toradol and droperidol. On reevaluation the patient reported some improvement of symptoms. All diagnostic studies were discussed with the patient in detail. She is appropriate for discharge. Recommendation was made for the patient to follow-up with her gastroenterologist as soon as possible for continued symptoms. Return precautions were discussed. Patient agrees with the plan for discharge and feels comfortable to go home with proper f/u. Advised to return for worsening or additional problems. Diagnostic tests were reviewed and questions answered. Diagnosis, care plan and treatment options were discussed.  The patient understands instructions and will follow up as directed. Amount and/or Complexity of Data Reviewed  External Data Reviewed: labs, radiology, ECG and notes. Details: Prior labs, imaging, EKG and notes reviewed  Labs: ordered. Decision-making details documented in ED Course. Radiology: ordered. Decision-making details documented in ED Course. ECG/medicine tests: ordered and independent interpretation performed. Risk  Prescription drug management. Disposition  Final diagnoses:   Abdominal pain   Diabetic gastroparesis      Time reflects when diagnosis was documented in both MDM as applicable and the Disposition within this note       Time User Action Codes Description Comment    11/14/2023  9:50 AM Tracy Short Add [R10.9] Abdominal pain     11/14/2023  9:50 AM Tracy Short Add [E11.43,  K31.84] Diabetic gastroparesis            ED Disposition       ED Disposition   Discharge    Condition   Stable    Date/Time   Tue Nov 14, 2023 [address] discharge to home/self care. Follow-up Information       Follow up With Specialties Details Why Contact Info Additional [address] [place], [address], Nurse Practitioner Schedule an appointment as soon as possible for a visit   [address] Emergency Department Emergency Medicine Go to  If symptoms worsen [address] [phone]  [facility] Emergency Department, [address] [facility], [address]            Discharge Medication List as of 11/14/2023  9:51 AM        CONTINUE these medications which have NOT CHANGED    Details   Alcohol Swabs 70 % PADS May substitute brand based on insurance coverage.  Check glucose TID., Normal      aluminum-magnesium hydroxide-simethicone (MAALOX) 7121-2469-353 mg/30 mL suspension Take 30 mL by mouth every 4 (four) hours as needed for indigestion or heartburn (gas), Starting Wed 9/6/2023, Normal      ARIPiprazole (ABILIFY) 10 mg tablet Take 1 tablet (10 mg total) by mouth daily, Starting Tue 11/8/2022, Normal      aspirin 81 mg chewable tablet Chew 1 tablet (81 mg total) daily Do not start before September 7, 2023., Starting Thu 9/7/2023, Until Tue 10/24/2023, Normal      atorvastatin (LIPITOR) 20 mg tablet Take 1 tablet (20 mg total) by mouth daily, Starting Wed 1/25/2023, Normal      Blood Glucose Monitoring Suppl (OneTouch Verio Reflect) w/Device KIT May substitute brand based on insurance coverage.  Check glucose TID., Normal      clonazePAM (KlonoPIN) 0.5 mg tablet Take 1 tablet (0.5 mg total) by mouth daily as needed for anxiety, Starting Thu 11/9/2023, Normal      dicyclomine (BENTYL) 10 mg capsule Take 2 capsules (20 mg total) by mouth 3 (three) times a day as needed (Cramping), Starting Thu 10/19/2023, Normal      docusate sodium (COLACE) 100 mg capsule Take 1 capsule (100 mg total) by mouth 2 (two) times a day, Starting Tue 8/22/2023, Normal      ergocalciferol (VITAMIN D2) 50,000 units TAKE 1 CAPSULE BY MOUTH 1 TIME A WEEK, Normal      escitalopram (LEXAPRO) 10 mg tablet TAKE 1 TABLET(10 MG) BY MOUTH DAILY, Normal      gabapentin (NEURONTIN) 800 mg tablet TAKE 1 TABLET BY MOUTH 3 TIMES  DAILY, Normal      hydrochlorothiazide (HYDRODIURIL) 12.5 mg tablet TAKE 1 TABLET(12.5 MG) BY MOUTH DAILY, Normal      insulin glargine (LANTUS) 100 units/mL subcutaneous injection Inject 25 Units under the skin daily at bedtime, Starting Tue 11/8/2022, No Print      levothyroxine 25 mcg tablet TAKE 1 TABLET(25 MCG) BY MOUTH DAILY IN THE EARLY MORNING, Normal      linaCLOtide 145 MCG CAPS Take 1 capsule (145 mcg total) by mouth in the morning, Starting Thu 11/2/2023, Until Sat 12/2/2023, Normal      metFORMIN (GLUCOPHAGE) 1000 MG tablet TAKE 1 TABLET(1000 MG) BY MOUTH TWICE DAILY WITH MEALS, Normal      Metoclopramide HCl (Gimoti) 15 MG/ACT SOLN 15 mg by Right Nare route 3 (three) times a day before meals for 30 doses, Starting Thu 11/9/2023, Until Sun 11/19/2023, Normal      metoprolol tartrate (LOPRESSOR) 50 mg tablet TAKE 1 TABLET BY MOUTH EVERY 12  HOURS, Starting Mon 10/23/2023, Normal      !! Microlet Lancets MISC Starting Mon 1/3/2022, Historical Med      ondansetron (ZOFRAN) 4 mg tablet Take 1 tablet (4 mg total) by mouth every 8 (eight) hours as needed for nausea or vomiting, Starting Thu 11/2/2023, Normal      !! OneTouch Delica Lancets 00H MISC May substitute brand based on insurance coverage. Check glucose TID., Normal      pantoprazole (PROTONIX) 40 mg tablet Take 1 tablet (40 mg total) by mouth 2 (two) times a day, Starting Thu 11/2/2023, Until Wed 1/31/2024, Normal      polyethylene glycol (MIRALAX) 17 g packet Take 17 g by mouth daily, Starting Wed 9/6/2023, Normal      senna (SENOKOT) 8.6 mg Take 1 tablet (8.6 mg total) by mouth daily Do not start before October 20, 2023., Starting Fri 10/20/2023, Normal      sucralfate (CARAFATE) 1 g tablet TAKE 1 TABLET BY MOUTH 4 TIMES  DAILY, Starting Thu 10/26/2023, Normal      SUMAtriptan (IMITREX) 50 mg tablet Take 1 tablet (50 mg total) by mouth once as needed for migraine for up to 1 dose may repeat in 2 hours if necessary, Starting Mon 5/15/2023, Normal      traZODone (DESYREL) 100 mg tablet Take 2 tablets (200 mg total) by mouth daily at bedtime, Starting Wed 11/1/2023, Normal       !! - Potential duplicate medications found. Please discuss with provider. No discharge procedures on file.     PDMP Review         Value Time User    PDMP Reviewed  Yes 11/9/2023  2:48 PM Myrtle Coon, [address] [place] [address]            ED Provider  Electronically Signed by             Sarah Hayes MD  11/14/23 4421

## 2023-11-14 NOTE — PROGRESS NOTES
I called Wai Green to ask if she had a preference for a location at [facility]. She asked me to make the appointment she does not have a preference.

## 2023-11-14 NOTE — PROGRESS NOTES
ADT alert received. Patient was seen in the ED at [facility] with abdominal pain. After being evaluated and having a workup done she was discharged home. I spoke with patient who needs assistance with making an appointment at [facility] attempt to call.

## 2023-11-14 NOTE — TELEPHONE ENCOUNTER
Please see previous messages. Patient also requesting referral for pain management. Please advise.  Thank you

## 2023-11-14 NOTE — PROGRESS NOTES
I called [facility]  and she took patient's name and  and utilized EPIC to forward the referral to the Gastroenterologist office. She advised me once the physician reviews the chart the office will call and schedule the patient.

## 2023-11-15 ENCOUNTER — TELEPHONE (OUTPATIENT)
Dept: GASTROENTEROLOGY | Facility: CLINIC | Age: 58
End: 2023-11-15

## 2023-11-17 LAB
ATRIAL RATE: 108 BPM
P AXIS: 72 DEGREES
PR INTERVAL: 176 MS
QRS AXIS: 36 DEGREES
QRSD INTERVAL: 70 MS
QT INTERVAL: 352 MS
QTC INTERVAL: 471 MS
T WAVE AXIS: 49 DEGREES
VENTRICULAR RATE: 108 BPM

## 2023-11-17 PROCEDURE — 93010 ELECTROCARDIOGRAM REPORT: CPT | Performed by: INTERNAL MEDICINE

## 2023-11-19 LAB
BACTERIA BLD CULT: NORMAL
BACTERIA BLD CULT: NORMAL

## 2023-11-21 ENCOUNTER — PATIENT OUTREACH (OUTPATIENT)
Dept: CASE MANAGEMENT | Facility: OTHER | Age: 58
End: 2023-11-21

## 2023-11-21 NOTE — PROGRESS NOTES
I spoke with patient who reports she did hear from [facility] concerning her appointment with a gastroenterologist but it is not scheduled until January 2024. She is still having abominal pain and bloating. She reports she is taking her medications as directed. I advised her I will call and ask if the office has a sooner appointment. She is very appreciative for the assistance.

## 2023-11-22 ENCOUNTER — PATIENT OUTREACH (OUTPATIENT)
Dept: CASE MANAGEMENT | Facility: OTHER | Age: 58
End: 2023-11-22

## 2023-11-22 NOTE — PROGRESS NOTES
I called Dr. Catina Kingsley and asked for a sooner appointment. They will speak to his office nurse and call the patient.

## 2023-11-22 NOTE — PROGRESS NOTES
I called patient and advised her the gastroenterologist office will be calling her about her appointment.

## 2023-11-27 ENCOUNTER — NURSE TRIAGE (OUTPATIENT)
Age: 58
End: 2023-11-27

## 2023-11-27 DIAGNOSIS — G43.009 MIGRAINE WITHOUT AURA AND WITHOUT STATUS MIGRAINOSUS, NOT INTRACTABLE: ICD-10-CM

## 2023-11-27 DIAGNOSIS — F31.4 BIPOLAR DISORDER, CURRENT EPISODE DEPRESSED, SEVERE, WITHOUT PSYCHOTIC FEATURES (HCC): ICD-10-CM

## 2023-11-27 RX ORDER — SUMATRIPTAN 50 MG/1
50 TABLET, FILM COATED ORAL ONCE AS NEEDED
Qty: 10 TABLET | Refills: 2 | Status: SHIPPED | OUTPATIENT
Start: 2023-11-27

## 2023-11-27 RX ORDER — CLONAZEPAM 0.5 MG/1
0.5 TABLET ORAL DAILY PRN
Qty: 30 TABLET | Refills: 0 | OUTPATIENT
Start: 2023-11-27

## 2023-11-27 NOTE — TELEPHONE ENCOUNTER
Pt. Leann Don increased abdominal pain into back , LOV 11/2/23, last EGD 3/21/23, Eating liquid diet for the past 2 days and continues to have abdominal pain and bloating, following gastroparesis diet  and has appt. with [facility] on January 3, 2024, pt using Protonix and Zofran for gastroparesis, pt. Having bloating and abdominal pain 5/10, pt. Unable to sleep due to pain, not eating solid foods due to increased pain and bloating, pt. LBM yesterday which was diarrhea, advised pt. To use Pepcid HS otc and Gaviscon when needed for increased reflux pain, wear loose fitting clothing and slowly introduce bland food into her diet, pt.  Verbalized understanding, any further recommendation, please advise         Reason for Disposition   The patient has upper abdominal fullness after meals    Answer Questions - Initial Assesment - Gerd Recurrent  When did your symptoms start: ongoing     How often are you experiencing symptoms: eveyday    When are your symptoms occurring: after eating     Are you currently taking: Judd Hashimoto meds: PPI: yes and anti-emetic: yes    Have you tried any OTC medications: not taking otc meds     Have any OTC medications resolved or lessened your symptoms: no    What recent testing has the patient had: last EGD 3/21/23    Are you following the diet and lifestyle modifications: yes    Any recent changes in your bowel habits: no    Protocols used: GERD

## 2023-12-06 ENCOUNTER — TELEPHONE (OUTPATIENT)
Age: 58
End: 2023-12-06

## 2023-12-06 DIAGNOSIS — F31.4 BIPOLAR DISORDER, CURRENT EPISODE DEPRESSED, SEVERE, WITHOUT PSYCHOTIC FEATURES (HCC): ICD-10-CM

## 2023-12-06 RX ORDER — CLONAZEPAM 0.5 MG/1
0.5 TABLET ORAL DAILY PRN
Qty: 30 TABLET | Refills: 0 | Status: SHIPPED | OUTPATIENT
Start: 2023-12-06

## 2023-12-06 NOTE — TELEPHONE ENCOUNTER
Patient called the Rx line. States that the Imitrex is not helping her migraine and wants to see what the doctor recommends. Please call patient.

## 2023-12-06 NOTE — TELEPHONE ENCOUNTER
Procedure Performed: bilateral L3,4,5 MEDIAL BRANCH BLOCK   sacrococcygeal joint injection  Preoperative Diagnoses:   1. Lumbar spondylosis    2. Coccygodynia           Postoperative Diagnoses:    1. Lumbar spondylosis    2. Coccygodynia           Surgeon:  Larisa Maya MD     Anethesia:  IV sedation was given by the RN per my orders until moderate sedation was achieved.  The patient was conversant throughout the procedure.      Description of Procedure:  After obtaining an informed written consent, a heplock was placed in the patient's arm and the patient was brought to the procedure room.  The patient was laid prone on the procedure room table with two pillows under the abdomen to decrease the lumbar lordosis.  Monitors were placed on the patient, and the patient was monitored continuously throughout the procedure.  The back was prepped and draped in the usual sterile fashion.  Procedure was done simultaneously for the right L3,4,5 medial branches first followed by the left L3,4,5 medial branches.  For the L3,4,5 medial branch, the L4,5 lumbar vertebra were identified by fluoroscopy.  The fluoroscope was then rotated ipsilaterally.  The target point was identified as the junction of the transverse process and the superior articular process of the vertebrae.    For the L5 medial branch the target point was the junction of the superior border of the sacral ala and the lateral surface of the superior articular facet of S1.    The skin over the target point was infiltrated with 1% lidocaine using a 25-gauge 1.5 inch needle.  A 22-gauge 3.5-inch quincke spinal needle with a curved tip was inserted into the skin over the target point and advanced till bone was encountered.  The position of the needle was confirmed in AP and lateral view.  This was done simultaneously for the right L3,4,5 medial branches using the same technique as described above.  After confirming negative aspiration for heme and cerebrospinal fluid, 1  Reason for call:   [x] Refill   [] Prior Auth  [] Other:     Office:   [x] PCP/Provider -   [] Specialty/Provider -     Medication: Klonopin    Dose/Frequency: 0.5 mg     Quantity: #30    Pharmacy: [facility]    Does the patient have enough for 3 days?    [] Yes   [x] No - Send as HP to POD ml of mixture of 80 mg depomedrol and 5 ml .25% plain preservative free bupicacaine was then injected into each of these sites.  The needles were then restyletted and withdrawn. Procedure was then repeated on the left side in the exact same manner described above. The skin and subcutaneous tissue over the sacral hiatus was infiltrated with 1% preservative free plain lidocaine using a 25-gauge 1.5 inch needle.  An 25-gauge 1.5 inchneedle was then inserted into the sacral hiatus and advanced under fluoroscopic guidance until it passed through the sacrococcygeal ligament.  A lateral image was obtained.  The angle of the needle was dropped and the needle was advanced about 1 cm under fluoroscopic guidance.  There was no evidence of parasthesias throughout needle placement.  After confirming negative aspiration for blood and CSF, 2 ml of .25% plain preservative free plain bupivacaine mixed with 40 mg kenalog was injected slowly without resistance.  The needle was then withdrawn. The patient tolerated the procedure well.  No complications were noted.  Vital signs remained stable throughout the procedure.  The patient was taken to the recovery room in stable condition.    The patient tolerated the procedure well.  No complications were noted.  Vital signs remained stable throughout.  Follow-up Instructions:    The patient was observed in the recovery room for about 30 minutes after the procedure.  Vital signs were obtained and oral fluids were given.  The patient was informed of the usual post procedural symptoms like increased stiffness and pain locally for the next 24 to 48 hours.  The patient was instructed to use NSAID's and ice the area.  The patient was told to resume preoperative medications and to use additional pain medication if needed.  The patient was instructed not to drive, shower or perform any strenuous physical activities for that day, but could shower and drive the next day.  The patient was instructed to  contact the physician with any questions over the next 24 hours.  The patient was also told to go to the emergency room if there is any new or significant pain in the back or if fever develops.  The patient is to follow up in two weeks.

## 2023-12-07 ENCOUNTER — VBI (OUTPATIENT)
Dept: ADMINISTRATIVE | Facility: OTHER | Age: 58
End: 2023-12-07

## 2023-12-12 ENCOUNTER — TELEPHONE (OUTPATIENT)
Age: 58
End: 2023-12-12

## 2023-12-12 ENCOUNTER — OFFICE VISIT (OUTPATIENT)
Dept: FAMILY MEDICINE CLINIC | Facility: CLINIC | Age: 58
End: 2023-12-12
Payer: COMMERCIAL

## 2023-12-12 VITALS
HEART RATE: 82 BPM | TEMPERATURE: 97.5 F | DIASTOLIC BLOOD PRESSURE: 70 MMHG | HEIGHT: 63 IN | RESPIRATION RATE: 18 BRPM | WEIGHT: 168 LBS | BODY MASS INDEX: 29.77 KG/M2 | SYSTOLIC BLOOD PRESSURE: 130 MMHG | OXYGEN SATURATION: 97 %

## 2023-12-12 DIAGNOSIS — G43.019 INTRACTABLE MIGRAINE WITHOUT AURA AND WITHOUT STATUS MIGRAINOSUS: Primary | ICD-10-CM

## 2023-12-12 DIAGNOSIS — M17.11 ARTHRITIS OF RIGHT KNEE: ICD-10-CM

## 2023-12-12 PROCEDURE — 99213 OFFICE O/P EST LOW 20 MIN: CPT | Performed by: NURSE PRACTITIONER

## 2023-12-12 NOTE — TELEPHONE ENCOUNTER
UBRELVY 50 mg needs a prior auth, waiting on ov note from today 12-12-23 to be completed so prior auth can be submitted.

## 2023-12-13 ENCOUNTER — OFFICE VISIT (OUTPATIENT)
Dept: OBGYN CLINIC | Facility: CLINIC | Age: 58
End: 2023-12-13
Payer: COMMERCIAL

## 2023-12-13 ENCOUNTER — APPOINTMENT (OUTPATIENT)
Dept: RADIOLOGY | Facility: AMBULARY SURGERY CENTER | Age: 58
End: 2023-12-13
Attending: STUDENT IN AN ORGANIZED HEALTH CARE EDUCATION/TRAINING PROGRAM
Payer: COMMERCIAL

## 2023-12-13 VITALS
HEIGHT: 63 IN | DIASTOLIC BLOOD PRESSURE: 91 MMHG | RESPIRATION RATE: 17 BRPM | HEART RATE: 99 BPM | WEIGHT: 168 LBS | BODY MASS INDEX: 29.77 KG/M2 | SYSTOLIC BLOOD PRESSURE: 154 MMHG

## 2023-12-13 DIAGNOSIS — M17.11 ARTHRITIS OF RIGHT KNEE: ICD-10-CM

## 2023-12-13 DIAGNOSIS — M17.11 PRIMARY OSTEOARTHRITIS OF RIGHT KNEE: Primary | ICD-10-CM

## 2023-12-13 PROCEDURE — 73564 X-RAY EXAM KNEE 4 OR MORE: CPT

## 2023-12-13 PROCEDURE — 99213 OFFICE O/P EST LOW 20 MIN: CPT | Performed by: STUDENT IN AN ORGANIZED HEALTH CARE EDUCATION/TRAINING PROGRAM

## 2023-12-13 NOTE — TELEPHONE ENCOUNTER
Ubrelvy 50 mg prior auth submitted with clinical documentation via CMM Key: [id number] due to multiple kick back errors from surescripts, waiting on determination.

## 2023-12-13 NOTE — PROGRESS NOTES
Ortho Sports Medicine Knee New Patient Visit     Assesment:   66 y.o. female right knee moderate to severe osteoarthritis    Plan:  The patient's diagnosis and treatment were discussed at length today. We discussed no treatment, non-operative treatment, and operative treatment. Madison Monroe presents today for initial evaluation right knee moderate to severe osteoarthritis. We did discuss at length both nonsurgical and surgical treatment options. I did discuss with her that during today's exam she does demonstrate moderate joint effusion and I discussed with her that we could aspirate her knee to rule out gout given the severity of her pain, however she declined at today's visit. I also discussed with her the possibility of a cortisone injection for relief of her symptoms, which she also declined at today's as she felt like it was only a temporary fix of her pain. I did provide her with a referral to outpatient physical therapy for hip and thigh strengthening and range of motion exercises as well as a short hinged knee brace for increased comfort and stability. Given the severity of her arthritic changes I discussed with her that I can provide her with a referral to orthopedic surgery to discuss possible total knee replacement. She can continue use ice and over-the-counter medication as needed for pain relief. No further follow-up with me as needed as she does not wish to proceed forward with nonsurgical treatment intervention. Conservative treatment:    Ice to knee for 20 minutes at least 1-2 times daily. PT for ROM/strengthening to knee, hip and core. OTC NSAIDS prn for pain. Let pain guide gradual return activities. Short hinged knee brace provided at today's visit. Imaging: All imaging from today was reviewed by myself and explained to the patient. Injection:    No Injection planned at this time.       Surgery:     No surgery is recommended at this point, continue with conservative treatment plan as noted. Follow up:    No follow-ups on file. Chief Complaint   Patient presents with    Right Knee - Follow-up       History of Present Illness: The patient is a 66 y.o. female  referred to me by their primary care physician, seen in clinic for consultation of right knee pain. She states that she has been having ongoing right knee pain now for several years without any specific injury or trauma. She states over the last several months she has been having increased medial and anterior knee pain which she describes as sharp and severe that does cause her to walk with an antalgic gait. She currently rates her pain a 9 out of 10. She states that she does have a prior history of multiple patella surgeries to correct a bony abnormality, however she is unable to recall what procedure or who performed the procedure. She states that she has also attempted multiple cortisone injections without any significant improvement of her symptoms. She states that going up and down steps, prolonged walking, as well as kneeling and squatting to increase her symptoms. She denies any instability. She does occasionally have some clicking and cracking sensation. She denies any mechanical symptoms of catching or locking. She has not attempted recent physical therapy. She is not using a short hinged knee brace. She denies any numbness or tingling.       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    Past Medical, Social and Family History:  Past Medical History:   Diagnosis Date    Addiction to drug ([address])     Adjustment disorder     Alcohol abuse     Alcoholism ([address])     Anxiety     Bipolar disorder ([address])     Bowel obstruction ([address])     Depression     Diabetes type 2, controlled ([address])     Disease of thyroid gland     Gastritis     Head injury     Heart palpitations     Hyperlipidemia     Hypertension     Hypothyroidism     Psychiatric illness     PTSD (post-traumatic stress disorder)     Seizure ([address])     Seizures ([address])     Sleep difficulties     Substance abuse ([address])     Suicide attempt ([address])     Withdrawal symptoms, drug or narcotic ([address])      Past Surgical History:   Procedure Laterality Date    ABDOMINAL SURGERY      APPENDECTOMY      BOWEL RESECTION      CHOLECYSTECTOMY      ESOPHAGOGASTRODUODENOSCOPY N/A 05/18/2018    Procedure: ESOPHAGOGASTRODUODENOSCOPY (EGD) with bx;  Surgeon: Blanca Lofton DO;  Location: [facility]; Service: Gastroenterology    HYSTERECTOMY      KNEE SURGERY Bilateral 1991     Allergies   Allergen Reactions    Losartan Cough    Latex Rash     Current Outpatient Medications on File Prior to Visit   Medication Sig Dispense Refill    Alcohol Swabs 70 % PADS May substitute brand based on insurance coverage. Check glucose TID. 100 each 0    aluminum-magnesium hydroxide-simethicone (MAALOX) 3908-7412-573 mg/30 mL suspension Take 30 mL by mouth every 4 (four) hours as needed for indigestion or heartburn (gas) 355 mL 0    ARIPiprazole (ABILIFY) 10 mg tablet Take 1 tablet (10 mg total) by mouth daily 30 tablet 0    atorvastatin (LIPITOR) 20 mg tablet Take 1 tablet (20 mg total) by mouth daily 90 tablet 1    Blood Glucose Monitoring Suppl (OneTouch Verio Reflect) w/Device KIT May substitute brand based on insurance coverage.  Check glucose TID. 1 kit 0    clonazePAM (KlonoPIN) 0.5 mg tablet Take 1 tablet (0.5 mg total) by mouth daily as needed for anxiety 30 tablet 0    dicyclomine (BENTYL) 10 mg capsule Take 2 capsules (20 mg total) by mouth 3 (three) times a day as needed (Cramping) 20 capsule 0    ergocalciferol (VITAMIN D2) 50,000 units TAKE 1 CAPSULE BY MOUTH 1 TIME A WEEK 12 capsule 1    escitalopram (LEXAPRO) 10 mg tablet TAKE 1 TABLET(10 MG) BY MOUTH DAILY 90 tablet 1    gabapentin (NEURONTIN) 800 mg tablet TAKE 1 TABLET BY MOUTH 3 TIMES  DAILY 90 tablet 5    hydrochlorothiazide (HYDRODIURIL) 12.5 mg tablet TAKE 1 TABLET(12.5 MG) BY MOUTH DAILY 90 tablet 1 insulin glargine (LANTUS) 100 units/mL subcutaneous injection Inject 25 Units under the skin daily at bedtime 10 mL 0    levothyroxine 25 mcg tablet TAKE 1 TABLET(25 MCG) BY MOUTH DAILY IN THE EARLY MORNING 90 tablet 3    metFORMIN (GLUCOPHAGE) 1000 MG tablet TAKE 1 TABLET(1000 MG) BY MOUTH TWICE DAILY WITH MEALS 180 tablet 1    metoprolol tartrate (LOPRESSOR) 50 mg tablet TAKE 1 TABLET BY MOUTH EVERY 12  HOURS 60 tablet 5    Microlet Lancets MISC       ondansetron (ZOFRAN) 4 mg tablet Take 1 tablet (4 mg total) by mouth every 8 (eight) hours as needed for nausea or vomiting 30 tablet 3    OneTouch Delica Lancets 68S MISC May substitute brand based on insurance coverage. Check glucose TID. 100 each 0    pantoprazole (PROTONIX) 40 mg tablet Take 1 tablet (40 mg total) by mouth 2 (two) times a day 180 tablet 2    polyethylene glycol (MIRALAX) 17 g packet Take 17 g by mouth daily 30 each 0    senna (SENOKOT) 8.6 mg Take 1 tablet (8.6 mg total) by mouth daily Do not start before October 20, 2023. 30 tablet 0    sucralfate (CARAFATE) 1 g tablet TAKE 1 TABLET BY MOUTH 4 TIMES  DAILY 360 tablet 1    traZODone (DESYREL) 100 mg tablet Take 2 tablets (200 mg total) by mouth daily at bedtime 60 tablet 2    Ubrogepant (UBRELVY) 50 MG tablet Take 1 tablet (50 mg) one time as needed for migraine. May repeat one additional tablet (50 mg) at least two hours after the first dose. Do not use more than two doses per day, or for more than eight days per month.  8 tablet 0    aspirin 81 mg chewable tablet Chew 1 tablet (81 mg total) daily Do not start before September 7, 2023. 30 tablet 0    docusate sodium (COLACE) 100 mg capsule Take 1 capsule (100 mg total) by mouth 2 (two) times a day for 14 days 28 capsule 0    docusate sodium (COLACE) 100 mg capsule Take 1 capsule (100 mg total) by mouth 2 (two) times a day (Patient not taking: Reported on 12/12/2023) 60 capsule 0    linaCLOtide 145 MCG CAPS Take 1 capsule (145 mcg total) by mouth in the morning 30 capsule 3     No current facility-administered medications on file prior to visit. Social History     Socioeconomic History    Marital status:      Spouse name: Not on file    Number of children: Not on file    Years of education: Not on file    Highest education level: Not on file   Occupational History    Not on file   Tobacco Use    Smoking status: Every Day     Current packs/day: 1.00     Average packs/day: 1 pack/day for 25.0 years (25.0 ttl pk-yrs)     Types: Cigarettes     Passive exposure: Current    Smokeless tobacco: Never    Tobacco comments:     smokes like 5 a day(06/24/2022)   Vaping Use    Vaping status: Never Used   Substance and Sexual Activity    Alcohol use: Not Currently     Alcohol/week: 6.0 standard drinks of alcohol     Types: 6 Cans of beer per week     Comment: last drink on 08/15/20    Drug use: Never    Sexual activity: Not Currently     Partners: Male     Birth control/protection: None   Other Topics Concern    Not on file   Social History Narrative    Not on file     Social Determinants of Health     Financial Resource Strain: Not on file   Food Insecurity: No Food Insecurity (10/17/2023)    Hunger Vital Sign     Worried About Running Out of Food in the Last Year: Never true     Ran Out of Food in the Last Year: Never true   Transportation Needs: No Transportation Needs (10/17/2023)    PRAPARE - Transportation     Lack of Transportation (Medical): No     Lack of Transportation (Non-Medical):  No   Physical Activity: Not on file   Stress: Not on file   Social Connections: Not on file   Intimate Partner Violence: Not on file   Housing Stability: Low Risk  (10/17/2023)    Housing Stability Vital Sign     Unable to Pay for Housing in the Last Year: No     Number of Places Lived in the Last Year: 1     Unstable Housing in the Last Year: No         I have reviewed the past medical, surgical, social and family history, medications and allergies as documented in the EMR.    Review of systems: ROS is negative other than that noted in the HPI. Constitutional: Negative for fatigue and fever. HENT: Negative for sore throat. Respiratory: Negative for shortness of breath. Cardiovascular: Negative for chest pain. Gastrointestinal: Negative for abdominal pain. Endocrine: Negative for cold intolerance and heat intolerance. Genitourinary: Negative for flank pain. Musculoskeletal: Negative for back pain. Skin: Negative for rash. Allergic/Immunologic: Negative for immunocompromised state. Neurological: Negative for dizziness. Psychiatric/Behavioral: Negative for agitation. Physical Exam:    Height 5' 3" (1.6 m), weight 76.2 kg (168 lb), not currently breastfeeding. General/Constitutional: NAD, well developed, well nourished  HENT: Normocephalic, atraumatic  CV: Intact distal pulses, regular rate  Resp: No respiratory distress or labored breathing  Lymphatic: No lymphadenopathy palpated  Neuro: Alert and Oriented x 3, no focal deficits  Psych: Normal mood, normal affect, normal judgement, normal behavior  Skin: Warm, dry, no rashes, no erythema      Knee Exam (focused):  Visual inspection of the right knee demonstrates normal contour without atrophy. Healed previous incisions   There is no significant erythema or edema. Moderate joint effusion  Range of motion is limited 5 to 110 degrees of flexion  Able to straight leg raise   Mild patellar tender to palpation  Moderate crepitus  Mild medial joint line tenderness, mild lateral joint line tenderness  - medial Dennise's, - lateral Dennise's  1A Lachman exam, stable posterior drawer  - dial test  Stable to varus and valgus stress at both 0 and 30°  Patella tracks normally. No J sign. No apprehension.   Translation is approximately 2 quadrants and is equal to the contralateral side  Patellar eversion is similar to the contralateral side    Examination of the patient's ipsilateral hip demonstrates full painless range of motion. No crepitus. LE NV Exam: +2 DP/PT pulses bilaterally  Sensation intact to light touch L2-S1 bilaterally     Bilateral hip ROM demonstrates no pain actively or passively    No calf tenderness to palpation bilaterally    Knee Imaging    X-rays of the right knee were reviewed, which demonstrate no acute osseous abnormalities with moderate to severe osteoarthritis. Joint space narrowing and significant osteophyte formation noted most prominent in the medial and patellofemoral compartment. I have reviewed the radiology report and do not currently have a radiology reading from [facility], but will check the result once the reading is performed.       Scribe Attestation      I,:  Estefania James am acting as a scribe while in the presence of the attending physician.:       I,:  Carlitos Degroot, DO personally performed the services described in this documentation    as scribed in my presence.:

## 2023-12-13 NOTE — LETTER
December 13, 2023     [facility], [address] [facility] [address] [facility] [address]    Patient: Waleska Lo   YOB: 1965   Date of Visit: 12/13/2023       Dear Dr. Uvaldo Vanegas: Thank you for referring Waleska Lo to me for evaluation. Below are my notes for this consultation. If you have questions, please do not hesitate to call me. I look forward to following your patient along with you. Sincerely,        Onjeni Zuluaga, DO        CC: No Recipients    Onjeni Zuluaga, DO  12/13/2023  4:05 PM  Sign when Signing Visit  Ortho Sports Medicine Knee New Patient Visit     Assesment:   66 y.o. female right knee moderate to severe osteoarthritis    Plan:  The patient's diagnosis and treatment were discussed at length today. We discussed no treatment, non-operative treatment, and operative treatment. Celsa Suarez presents today for initial evaluation right knee moderate to severe osteoarthritis. We did discuss at length both nonsurgical and surgical treatment options. I did discuss with her that during today's exam she does demonstrate moderate joint effusion and I discussed with her that we could aspirate her knee to rule out gout given the severity of her pain, however she declined at today's visit. I also discussed with her the possibility of a cortisone injection for relief of her symptoms, which she also declined at today's as she felt like it was only a temporary fix of her pain. I did provide her with a referral to outpatient physical therapy for hip and thigh strengthening and range of motion exercises as well as a short hinged knee brace for increased comfort and stability. Given the severity of her arthritic changes I discussed with her that I can provide her with a referral to orthopedic surgery to discuss possible total knee replacement. She can continue use ice and over-the-counter medication as needed for pain relief.   No further follow-up with me as needed as she does not wish to proceed forward with nonsurgical treatment intervention. Conservative treatment:    Ice to knee for 20 minutes at least 1-2 times daily. PT for ROM/strengthening to knee, hip and core. OTC NSAIDS prn for pain. Let pain guide gradual return activities. Short hinged knee brace provided at today's visit. Imaging: All imaging from today was reviewed by myself and explained to the patient. Injection:    No Injection planned at this time. Surgery:     No surgery is recommended at this point, continue with conservative treatment plan as noted. Follow up:    No follow-ups on file. Chief Complaint   Patient presents with   • Right Knee - Follow-up       History of Present Illness: The patient is a 66 y.o. female  referred to me by their primary care physician, seen in clinic for consultation of right knee pain. She states that she has been having ongoing right knee pain now for several years without any specific injury or trauma. She states over the last several months she has been having increased medial and anterior knee pain which she describes as sharp and severe that does cause her to walk with an antalgic gait. She currently rates her pain a 9 out of 10. She states that she does have a prior history of multiple patella surgeries to correct a bony abnormality, however she is unable to recall what procedure or who performed the procedure. She states that she has also attempted multiple cortisone injections without any significant improvement of her symptoms. She states that going up and down steps, prolonged walking, as well as kneeling and squatting to increase her symptoms. She denies any instability. She does occasionally have some clicking and cracking sensation. She denies any mechanical symptoms of catching or locking. She has not attempted recent physical therapy. She is not using a short hinged knee brace.   She denies any numbness or tingling. 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    Past Medical, Social and Family History:  Past Medical History:   Diagnosis Date   • Addiction to drug [facility])    • Adjustment disorder    • Alcohol abuse    • Alcoholism ([address])    • Anxiety    • Bipolar disorder ([address])    • Bowel obstruction ([address])    • Depression    • Diabetes type 2, controlled ([address])    • Disease of thyroid gland    • Gastritis    • Head injury    • Heart palpitations    • Hyperlipidemia    • Hypertension    • Hypothyroidism    • Psychiatric illness    • PTSD (post-traumatic stress disorder)    • Seizure ([address])    • Seizures ([address])    • Sleep difficulties    • Substance abuse ([address])    • Suicide attempt ([address])    • Withdrawal symptoms, drug or narcotic ([address])      Past Surgical History:   Procedure Laterality Date   • ABDOMINAL SURGERY     • APPENDECTOMY     • BOWEL RESECTION     • CHOLECYSTECTOMY     • ESOPHAGOGASTRODUODENOSCOPY N/A 05/18/2018    Procedure: ESOPHAGOGASTRODUODENOSCOPY (EGD) with bx;  Surgeon: Marlene Carey DO;  Location: [facility]; Service: Gastroenterology   • HYSTERECTOMY     • KNEE SURGERY Bilateral 1991     Allergies   Allergen Reactions   • Losartan Cough   • Latex Rash     Current Outpatient Medications on File Prior to Visit   Medication Sig Dispense Refill   • Alcohol Swabs 70 % PADS May substitute brand based on insurance coverage. Check glucose TID. 100 each 0   • aluminum-magnesium hydroxide-simethicone (MAALOX) 8256-3593-708 mg/30 mL suspension Take 30 mL by mouth every 4 (four) hours as needed for indigestion or heartburn (gas) 355 mL 0   • ARIPiprazole (ABILIFY) 10 mg tablet Take 1 tablet (10 mg total) by mouth daily 30 tablet 0   • atorvastatin (LIPITOR) 20 mg tablet Take 1 tablet (20 mg total) by mouth daily 90 tablet 1   • Blood Glucose Monitoring Suppl (OneTouch Verio Reflect) w/Device KIT May substitute brand based on insurance coverage.  Check glucose TID. 1 kit 0   • clonazePAM (KlonoPIN) 0.5 mg tablet Take 1 tablet (0.5 mg total) by mouth daily as needed for anxiety 30 tablet 0   • dicyclomine (BENTYL) 10 mg capsule Take 2 capsules (20 mg total) by mouth 3 (three) times a day as needed (Cramping) 20 capsule 0   • ergocalciferol (VITAMIN D2) 50,000 units TAKE 1 CAPSULE BY MOUTH 1 TIME A WEEK 12 capsule 1   • escitalopram (LEXAPRO) 10 mg tablet TAKE 1 TABLET(10 MG) BY MOUTH DAILY 90 tablet 1   • gabapentin (NEURONTIN) 800 mg tablet TAKE 1 TABLET BY MOUTH 3 TIMES  DAILY 90 tablet 5   • hydrochlorothiazide (HYDRODIURIL) 12.5 mg tablet TAKE 1 TABLET(12.5 MG) BY MOUTH DAILY 90 tablet 1   • insulin glargine (LANTUS) 100 units/mL subcutaneous injection Inject 25 Units under the skin daily at bedtime 10 mL 0   • levothyroxine 25 mcg tablet TAKE 1 TABLET(25 MCG) BY MOUTH DAILY IN THE EARLY MORNING 90 tablet 3   • metFORMIN (GLUCOPHAGE) 1000 MG tablet TAKE 1 TABLET(1000 MG) BY MOUTH TWICE DAILY WITH MEALS 180 tablet 1   • metoprolol tartrate (LOPRESSOR) 50 mg tablet TAKE 1 TABLET BY MOUTH EVERY 12  HOURS 60 tablet 5   • Microlet Lancets MISC      • ondansetron (ZOFRAN) 4 mg tablet Take 1 tablet (4 mg total) by mouth every 8 (eight) hours as needed for nausea or vomiting 30 tablet 3   • OneTouch Delica Lancets 92H MISC May substitute brand based on insurance coverage. Check glucose TID. 100 each 0   • pantoprazole (PROTONIX) 40 mg tablet Take 1 tablet (40 mg total) by mouth 2 (two) times a day 180 tablet 2   • polyethylene glycol (MIRALAX) 17 g packet Take 17 g by mouth daily 30 each 0   • senna (SENOKOT) 8.6 mg Take 1 tablet (8.6 mg total) by mouth daily Do not start before October 20, 2023. 30 tablet 0   • sucralfate (CARAFATE) 1 g tablet TAKE 1 TABLET BY MOUTH 4 TIMES  DAILY 360 tablet 1   • traZODone (DESYREL) 100 mg tablet Take 2 tablets (200 mg total) by mouth daily at bedtime 60 tablet 2   • Ubrogepant (UBRELVY) 50 MG tablet Take 1 tablet (50 mg) one time as needed for migraine.  May repeat one additional tablet (50 mg) at least two hours after the first dose. Do not use more than two doses per day, or for more than eight days per month. 8 tablet 0   • aspirin 81 mg chewable tablet Chew 1 tablet (81 mg total) daily Do not start before September 7, 2023. 30 tablet 0   • docusate sodium (COLACE) 100 mg capsule Take 1 capsule (100 mg total) by mouth 2 (two) times a day for 14 days 28 capsule 0   • docusate sodium (COLACE) 100 mg capsule Take 1 capsule (100 mg total) by mouth 2 (two) times a day (Patient not taking: Reported on 12/12/2023) 60 capsule 0   • linaCLOtide 145 MCG CAPS Take 1 capsule (145 mcg total) by mouth in the morning 30 capsule 3     No current facility-administered medications on file prior to visit.      Social History     Socioeconomic History   • Marital status:      Spouse name: Not on file   • Number of children: Not on file   • Years of education: Not on file   • Highest education level: Not on file   Occupational History   • Not on file   Tobacco Use   • Smoking status: Every Day     Current packs/day: 1.00     Average packs/day: 1 pack/day for 25.0 years (25.0 ttl pk-yrs)     Types: Cigarettes     Passive exposure: Current   • Smokeless tobacco: Never   • Tobacco comments:     smokes like 5 a day(06/24/2022)   Vaping Use   • Vaping status: Never Used   Substance and Sexual Activity   • Alcohol use: Not Currently     Alcohol/week: 6.0 standard drinks of alcohol     Types: 6 Cans of beer per week     Comment: last drink on 08/15/20   • Drug use: Never   • Sexual activity: Not Currently     Partners: Male     Birth control/protection: None   Other Topics Concern   • Not on file   Social History Narrative   • Not on file     Social Determinants of Health     Financial Resource Strain: Not on file   Food Insecurity: No Food Insecurity (10/17/2023)    Hunger Vital Sign    • Worried About Running Out of Food in the Last Year: Never true    • Ran Out of Food in the Last Year: Never true   Transportation Needs: No Transportation Needs (10/17/2023)    PRAPARE - Transportation    • Lack of Transportation (Medical): No    • Lack of Transportation (Non-Medical): No   Physical Activity: Not on file   Stress: Not on file   Social Connections: Not on file   Intimate Partner Violence: Not on file   Housing Stability: Low Risk  (10/17/2023)    Housing Stability Vital Sign    • Unable to Pay for Housing in the Last Year: No    • Number of Places Lived in the Last Year: 1    • Unstable Housing in the Last Year: No         I have reviewed the past medical, surgical, social and family history, medications and allergies as documented in the EMR. Review of systems: ROS is negative other than that noted in the HPI. Constitutional: Negative for fatigue and fever. HENT: Negative for sore throat. Respiratory: Negative for shortness of breath. Cardiovascular: Negative for chest pain. Gastrointestinal: Negative for abdominal pain. Endocrine: Negative for cold intolerance and heat intolerance. Genitourinary: Negative for flank pain. Musculoskeletal: Negative for back pain. Skin: Negative for rash. Allergic/Immunologic: Negative for immunocompromised state. Neurological: Negative for dizziness. Psychiatric/Behavioral: Negative for agitation. Physical Exam:    Height 5' 3" (1.6 m), weight 76.2 kg (168 lb), not currently breastfeeding. General/Constitutional: NAD, well developed, well nourished  HENT: Normocephalic, atraumatic  CV: Intact distal pulses, regular rate  Resp: No respiratory distress or labored breathing  Lymphatic: No lymphadenopathy palpated  Neuro: Alert and Oriented x 3, no focal deficits  Psych: Normal mood, normal affect, normal judgement, normal behavior  Skin: Warm, dry, no rashes, no erythema      Knee Exam (focused):  Visual inspection of the right knee demonstrates normal contour without atrophy.    Healed previous incisions   There is no significant erythema or edema. Moderate joint effusion  Range of motion is limited 5 to 110 degrees of flexion  Able to straight leg raise   Mild patellar tender to palpation  Moderate crepitus  Mild medial joint line tenderness, mild lateral joint line tenderness  - medial Dennise's, - lateral Dennise's  1A Lachman exam, stable posterior drawer  - dial test  Stable to varus and valgus stress at both 0 and 30°  Patella tracks normally. No J sign. No apprehension. Translation is approximately 2 quadrants and is equal to the contralateral side  Patellar eversion is similar to the contralateral side    Examination of the patient's ipsilateral hip demonstrates full painless range of motion. No crepitus. LE NV Exam: +2 DP/PT pulses bilaterally  Sensation intact to light touch L2-S1 bilaterally     Bilateral hip ROM demonstrates no pain actively or passively    No calf tenderness to palpation bilaterally    Knee Imaging    X-rays of the right knee were reviewed, which demonstrate no acute osseous abnormalities with moderate to severe osteoarthritis. Joint space narrowing and significant osteophyte formation noted most prominent in the medial and patellofemoral compartment. I have reviewed the radiology report and do not currently have a radiology reading from [facility], but will check the result once the reading is performed.       Scribe Attestation      I,:  Bennett Snider am acting as a scribe while in the presence of the attending physician.:       I,:  Jose Miguel Blum DO personally performed the services described in this documentation    as scribed in my presence.:

## 2023-12-13 NOTE — TELEPHONE ENCOUNTER
UBRELVY 50 mg Prior Auth Submitted via Sures[employer]s with clinical documentation, waiting on determination.

## 2023-12-13 NOTE — PROGRESS NOTES
Assessment/Plan:    1. Intractable migraine without aura and without status migrainosus  -     Ubrogepant (UBRELVY) 50 MG tablet; Take 1 tablet (50 mg) one time as needed for migraine. May repeat one additional tablet (50 mg) at least two hours after the first dose. Do not use more than two doses per day, or for more than eight days per month. 2. Arthritis of right knee  -     Ambulatory Referral to Orthopedic Surgery; Future    The case discussed with patient using patient centered shared decision making. The patient was counseled regarding instructions for management,-- risk factor reductions,-- prognosis,-- impressions,-- risks and benefits of treatment options,-- importance of compliance with treatment. I have reviewed the instructions with the patient, answering all questions to her satisfaction. Refractory migraines  Not responsive to sumatripans any longer  Can't take NSAIDs d/t severe abd pain,   Already on beta blocker   Will try to get Michelle Barraza approved    2. Recurrent knee pain with h/o TKR with re-do  Referral to ortho for possible injection        There are no Patient Instructions on file for this visit. No follow-ups on file. Subjective:      Patient ID: Bernardo Davies is a 66 y.o. female. Chief Complaint   Patient presents with    Follow-up     botox       Here for same day visit    1. History of migraines  Imitrex not working any more  Can not take NSAIDs d/t refractory abd pain  Migraines worse recently in setting of inordinate stress  All ready on beta blocker    2. 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  Would like to f/u with ortho        The following portions of the patient's history were reviewed and updated as appropriate: allergies, current medications, past family history, past medical history, past social history, past surgical history and problem list.    Review of Systems   Constitutional:  Positive for fatigue. Negative for fever. Respiratory:  Negative for chest tightness. Musculoskeletal:  Positive for arthralgias and gait problem. Neurological:  Positive for headaches. Psychiatric/Behavioral:  Positive for dysphoric mood and sleep disturbance. The patient is nervous/anxious. Current Outpatient Medications   Medication Sig Dispense Refill    Alcohol Swabs 70 % PADS May substitute brand based on insurance coverage. Check glucose TID. 100 each 0    aluminum-magnesium hydroxide-simethicone (MAALOX) 0134-2337-305 mg/30 mL suspension Take 30 mL by mouth every 4 (four) hours as needed for indigestion or heartburn (gas) 355 mL 0    ARIPiprazole (ABILIFY) 10 mg tablet Take 1 tablet (10 mg total) by mouth daily 30 tablet 0    aspirin 81 mg chewable tablet Chew 1 tablet (81 mg total) daily Do not start before September 7, 2023. 30 tablet 0    atorvastatin (LIPITOR) 20 mg tablet Take 1 tablet (20 mg total) by mouth daily 90 tablet 1    Blood Glucose Monitoring Suppl (OneTouch Verio Reflect) w/Device KIT May substitute brand based on insurance coverage.  Check glucose TID. 1 kit 0    clonazePAM (KlonoPIN) 0.5 mg tablet Take 1 tablet (0.5 mg total) by mouth daily as needed for anxiety 30 tablet 0    dicyclomine (BENTYL) 10 mg capsule Take 2 capsules (20 mg total) by mouth 3 (three) times a day as needed (Cramping) 20 capsule 0    docusate sodium (COLACE) 100 mg capsule Take 1 capsule (100 mg total) by mouth 2 (two) times a day for 14 days 28 capsule 0    ergocalciferol (VITAMIN D2) 50,000 units TAKE 1 CAPSULE BY MOUTH 1 TIME A WEEK 12 capsule 1    escitalopram (LEXAPRO) 10 mg tablet TAKE 1 TABLET(10 MG) BY MOUTH DAILY 90 tablet 1    gabapentin (NEURONTIN) 800 mg tablet TAKE 1 TABLET BY MOUTH 3 TIMES  DAILY 90 tablet 5    hydrochlorothiazide (HYDRODIURIL) 12.5 mg tablet TAKE 1 TABLET(12.5 MG) BY MOUTH DAILY 90 tablet 1    insulin glargine (LANTUS) 100 units/mL subcutaneous injection Inject 25 Units under the skin daily at bedtime 10 mL 0    levothyroxine 25 mcg tablet TAKE 1 TABLET(25 MCG) BY MOUTH DAILY IN THE EARLY MORNING 90 tablet 3    linaCLOtide 145 MCG CAPS Take 1 capsule (145 mcg total) by mouth in the morning 30 capsule 3    metFORMIN (GLUCOPHAGE) 1000 MG tablet TAKE 1 TABLET(1000 MG) BY MOUTH TWICE DAILY WITH MEALS 180 tablet 1    metoprolol tartrate (LOPRESSOR) 50 mg tablet TAKE 1 TABLET BY MOUTH EVERY 12  HOURS 60 tablet 5    Microlet Lancets MISC       ondansetron (ZOFRAN) 4 mg tablet Take 1 tablet (4 mg total) by mouth every 8 (eight) hours as needed for nausea or vomiting 30 tablet 3    OneTouch Delica Lancets 81Z MISC May substitute brand based on insurance coverage. Check glucose TID. 100 each 0    pantoprazole (PROTONIX) 40 mg tablet Take 1 tablet (40 mg total) by mouth 2 (two) times a day 180 tablet 2    polyethylene glycol (MIRALAX) 17 g packet Take 17 g by mouth daily 30 each 0    senna (SENOKOT) 8.6 mg Take 1 tablet (8.6 mg total) by mouth daily Do not start before October 20, 2023. 30 tablet 0    sucralfate (CARAFATE) 1 g tablet TAKE 1 TABLET BY MOUTH 4 TIMES  DAILY 360 tablet 1    traZODone (DESYREL) 100 mg tablet Take 2 tablets (200 mg total) by mouth daily at bedtime 60 tablet 2    Ubrogepant (UBRELVY) 50 MG tablet Take 1 tablet (50 mg) one time as needed for migraine. May repeat one additional tablet (50 mg) at least two hours after the first dose. Do not use more than two doses per day, or for more than eight days per month. 8 tablet 0    docusate sodium (COLACE) 100 mg capsule Take 1 capsule (100 mg total) by mouth 2 (two) times a day (Patient not taking: Reported on 12/12/2023) 60 capsule 0     No current facility-administered medications for this visit.        Objective:    /70 (BP Location: Left arm, Patient Position: Sitting, Cuff Size: Standard)   Pulse 82   Temp 97.5 °F (36.4 °C) (Temporal)   Resp 18   Ht 5' 3" (1.6 m) Wt 76.2 kg (168 lb)   SpO2 97%   BMI 29.76 kg/m²        Physical Exam  Vitals and nursing note reviewed. Constitutional:       General: She is not in acute distress. Appearance: Normal appearance. Cardiovascular:      Rate and Rhythm: Normal rate and regular rhythm. Pulses: Normal pulses. Pulmonary:      Effort: Pulmonary effort is normal.      Breath sounds: Normal breath sounds. Lymphadenopathy:      Cervical: No cervical adenopathy. Neurological:      General: No focal deficit present. Mental Status: She is alert. Mental status is at baseline.    Psychiatric:         Mood and Affect: Mood normal.                Xiomara Saavedra, [address] [place] [address]

## 2023-12-14 NOTE — TELEPHONE ENCOUNTER
Ubrelvy 50 mg prior auth approved, pt made aware. Approval letter in media / faxed to pharmacy on file.   Valid: 12- to 12-

## 2023-12-26 ENCOUNTER — TELEPHONE (OUTPATIENT)
Age: 58
End: 2023-12-26

## 2023-12-26 ENCOUNTER — TELEPHONE (OUTPATIENT)
Dept: OBGYN CLINIC | Facility: HOSPITAL | Age: 58
End: 2023-12-26

## 2023-12-26 DIAGNOSIS — F31.4 BIPOLAR DISORDER, CURRENT EPISODE DEPRESSED, SEVERE, WITHOUT PSYCHOTIC FEATURES (HCC): ICD-10-CM

## 2023-12-26 RX ORDER — CLONAZEPAM 0.5 MG/1
0.5 TABLET ORAL DAILY PRN
Qty: 30 TABLET | Refills: 0 | OUTPATIENT
Start: 2023-12-26

## 2023-12-26 NOTE — TELEPHONE ENCOUNTER
Caller: Patient    Doctor: Dusty West    Reason for call: Patient called back regarding earlier question regarding pain.  Please call patient back to go over details.    Call back#: [phone]

## 2023-12-26 NOTE — TELEPHONE ENCOUNTER
Reason for call:   [x] Refill   [] Prior Auth  [] Other:     Office:   [x] PCP/Provider - [facility]   [] Specialty/Provider -     Medication: Clonazepam     Dose/Frequency: 0.5mg QD PRN     Quantity: 30    Pharmacy: [id number]    Does the patient have enough for 3 days?   [] Yes   [x] No - Send as HP to POD

## 2023-12-26 NOTE — TELEPHONE ENCOUNTER
Caller: Patient    Doctor: Dusty West    Reason for call: Patient is having severe pain in her knee, she is due to see Dr. Chavis 01/05/204.     Patient is in tears on the phone, does not know what else to do. Asking if there is something you can do to help her, medication wise.     [facility]    Call back#: [phone]

## 2023-12-26 NOTE — TELEPHONE ENCOUNTER
Caller: Bhavana    Doctor: Dr West / ref to Partha / Alan     Reason for call: See previous messages    Patient is in tears on the phone.   I relayed to patient message from Dr. West      I can't provide anything stronger than tylenol and ibuprofen. She can take up to 1000mg tylenol q8h and 600mg ibuprofen [id number]    Patient understood.    Will call back to check for sooner appointment with Dr. Chavis.        Call back#:

## 2023-12-29 ENCOUNTER — TELEPHONE (OUTPATIENT)
Dept: FAMILY MEDICINE CLINIC | Facility: CLINIC | Age: 58
End: 2023-12-29

## 2023-12-29 DIAGNOSIS — F31.4 BIPOLAR DISORDER, CURRENT EPISODE DEPRESSED, SEVERE, WITHOUT PSYCHOTIC FEATURES (HCC): ICD-10-CM

## 2023-12-29 RX ORDER — CLONAZEPAM 0.5 MG/1
0.5 TABLET ORAL DAILY PRN
Qty: 30 TABLET | Refills: 0 | Status: SHIPPED | OUTPATIENT
Start: 2023-12-29

## 2023-12-29 NOTE — TELEPHONE ENCOUNTER
Patient reports she is having knee issues and will be needing surgery in near future, she is also scheduled for surgery in future for gastro/stomach issues, but she fears she will be unable to make trip to [facility] with knee pain.    She is overwhelmed with the decisions she needs to make, would like to speak with office.

## 2023-12-29 NOTE — TELEPHONE ENCOUNTER
She reports she has been taking this medication twice daily- and now script is different. Please review and advise.    Reason for call:   [x] Refill   [] Prior Auth  [] Other:     Office:   [x] PCP/Provider - [facility]  [] Specialty/Provider -     Medication:   clonazePAM (KlonoPIN) 0.5 mg tablet     Dose/Frequency: Take 1 tablet (0.5 mg total) by mouth daily as needed for anxiety     Quantity: 30    Pharmacy: [facility]    Does the patient have enough for 3 days?   [] Yes   [x] No - Send as HP to POD

## 2024-01-01 PROBLEM — Z12.11 COLON CANCER SCREENING: Status: RESOLVED | Noted: 2022-06-24 | Resolved: 2024-01-01

## 2024-01-04 DIAGNOSIS — E55.9 VITAMIN D DEFICIENCY: ICD-10-CM

## 2024-01-04 DIAGNOSIS — F31.9 BIPOLAR 1 DISORDER, DEPRESSED (HCC): ICD-10-CM

## 2024-01-04 DIAGNOSIS — I10 PRIMARY HYPERTENSION: ICD-10-CM

## 2024-01-05 ENCOUNTER — OFFICE VISIT (OUTPATIENT)
Dept: OBGYN CLINIC | Facility: CLINIC | Age: 59
End: 2024-01-05
Payer: COMMERCIAL

## 2024-01-05 VITALS — WEIGHT: 168 LBS | HEIGHT: 63 IN | BODY MASS INDEX: 29.77 KG/M2

## 2024-01-05 DIAGNOSIS — M17.11 PRIMARY OSTEOARTHRITIS OF RIGHT KNEE: ICD-10-CM

## 2024-01-05 DIAGNOSIS — M25.561 CHRONIC PAIN OF RIGHT KNEE: Primary | ICD-10-CM

## 2024-01-05 DIAGNOSIS — M25.461 EFFUSION OF RIGHT KNEE: ICD-10-CM

## 2024-01-05 DIAGNOSIS — G89.29 CHRONIC PAIN OF RIGHT KNEE: Primary | ICD-10-CM

## 2024-01-05 LAB — CRYSTALS SNV QL MICRO: NORMAL

## 2024-01-05 PROCEDURE — 87070 CULTURE OTHR SPECIMN AEROBIC: CPT | Performed by: ORTHOPAEDIC SURGERY

## 2024-01-05 PROCEDURE — 87205 SMEAR GRAM STAIN: CPT | Performed by: ORTHOPAEDIC SURGERY

## 2024-01-05 PROCEDURE — 87476 LYME DIS DNA AMP PROBE: CPT | Performed by: ORTHOPAEDIC SURGERY

## 2024-01-05 PROCEDURE — 89060 EXAM SYNOVIAL FLUID CRYSTALS: CPT | Performed by: ORTHOPAEDIC SURGERY

## 2024-01-05 PROCEDURE — 99214 OFFICE O/P EST MOD 30 MIN: CPT | Performed by: ORTHOPAEDIC SURGERY

## 2024-01-05 RX ORDER — ERGOCALCIFEROL 1.25 MG/1
50000 CAPSULE ORAL WEEKLY
Qty: 12 CAPSULE | Refills: 1 | Status: SHIPPED | OUTPATIENT
Start: 2024-01-05

## 2024-01-05 RX ORDER — GABAPENTIN 800 MG/1
TABLET ORAL
Qty: 270 TABLET | Refills: 1 | Status: SHIPPED | OUTPATIENT
Start: 2024-01-05

## 2024-01-05 RX ORDER — METOPROLOL TARTRATE 50 MG/1
50 TABLET, FILM COATED ORAL EVERY 12 HOURS
Qty: 180 TABLET | Refills: 1 | Status: SHIPPED | OUTPATIENT
Start: 2024-01-05

## 2024-01-05 NOTE — PROGRESS NOTES
Assessment:  1. Chronic pain of right knee  MRI knee right  wo contrast      2. Primary osteoarthritis of right knee  Ambulatory Referral to Orthopedic Surgery    MRI knee right  wo contrast        Patient Active Problem List   Diagnosis    Abdominal pain    Essential hypertension    Nausea and vomiting    Alcohol abuse, in remission    Post-traumatic stress disorder, chronic    Generalized anxiety disorder    Elevated lactic acid level    Bipolar disorder current episode depressed (HCC)    Bipolar 1 disorder, depressed (HCC)    Benzodiazepine dependence, continuous (HCC)    Non compliance w medication regimen    Acquired hypothyroidism    Hypokalemia    Hyperlipemia    Transaminitis    Cognitive dysfunction in bipolar disorder (HCC)    Tobacco abuse    Gastroesophageal reflux disease without esophagitis    Diabetes mellitus type 2 in obese     Primary hypertension    Hypothyroidism    Vitamin D deficiency    Hypertensive urgency    Cigarette smoker    Chronic cough    Pulmonary emphysema, unspecified emphysema type (HCC)    Migraine without aura and without status migrainosus, not intractable    Diabetic gastroparesis     Constipation    Bloated abdomen    Hx of adenomatous colonic polyps    Primary osteoarthritis of right knee         Plan      58 y.o. with right knee pain. Upon review of the x-rays, a thorough history and my examination, Bhavana is presenting with signs and symptoms consistent with osteoarthritis. Patient does have moderate osteoarthritis as seen on radiographs, however due to the severity of her pain, her inability to move her knee/weight bear an MRI is warranted for further evaluation. A stress fracture needs to be ruled out. Smoking cessation was also discussed.         Subjective:     Patient ID:    Chief Complaint:Bhavana Smith 58 y.o. female      HPI    Presents to the office for initial evaluation of right knee pain.  Patient has previously seen Dr. Duval and Dr. West.  Prior  history of multiple patella surgeries to correct a bony abnormality, however she is unable to recall what procedure was performed. Procedures were approximately 30 years ago.  Dr Duval administered a corticosteroid injection into the right knee 1/9/2023, patient states provided no benefit and caused her knee to give out later that day. Patient and her  state that the severity of her pain has progressed in recent months, patient can barely weight bear at this time. Pain is diffuse to the knee, it is described as an excruciating sharp pain, rated 10/10.         The following portions of the patient's history were reviewed and updated as appropriate: allergies, current medications, past family history, past social history, past surgical history and problem list.        Social History     Socioeconomic History    Marital status:      Spouse name: Not on file    Number of children: Not on file    Years of education: Not on file    Highest education level: Not on file   Occupational History    Not on file   Tobacco Use    Smoking status: Every Day     Current packs/day: 1.00     Average packs/day: 1 pack/day for 25.0 years (25.0 ttl pk-yrs)     Types: Cigarettes     Passive exposure: Current    Smokeless tobacco: Never    Tobacco comments:     smokes like 5 a day(06/24/2022)   Vaping Use    Vaping status: Never Used   Substance and Sexual Activity    Alcohol use: Not Currently     Alcohol/week: 6.0 standard drinks of alcohol     Types: 6 Cans of beer per week     Comment: last drink on 08/15/20    Drug use: Never    Sexual activity: Not Currently     Partners: Male     Birth control/protection: None   Other Topics Concern    Not on file   Social History Narrative    Not on file     Social Determinants of Health     Financial Resource Strain: Not on file   Food Insecurity: No Food Insecurity (10/17/2023)    Hunger Vital Sign     Worried About Running Out of Food in the Last Year: Never true     Ran Out of  Food in the Last Year: Never true   Transportation Needs: No Transportation Needs (10/17/2023)    PRAPARE - Transportation     Lack of Transportation (Medical): No     Lack of Transportation (Non-Medical): No   Physical Activity: Not on file   Stress: Not on file   Social Connections: Not on file   Intimate Partner Violence: Not on file   Housing Stability: Low Risk  (10/17/2023)    Housing Stability Vital Sign     Unable to Pay for Housing in the Last Year: No     Number of Places Lived in the Last Year: 1     Unstable Housing in the Last Year: No     Past Medical History:   Diagnosis Date    Addiction to drug (HCC)     Adjustment disorder     Alcohol abuse     Alcoholism (HCC)     Anxiety     Bipolar disorder (HCC)     Bowel obstruction (HCC)     Depression     Diabetes type 2, controlled (HCC)     Disease of thyroid gland     Gastritis     Head injury     Heart palpitations     Hyperlipidemia     Hypertension     Hypothyroidism     Psychiatric illness     PTSD (post-traumatic stress disorder)     Seizure (HCC)     Seizures (HCC)     Sleep difficulties     Substance abuse (HCC)     Suicide attempt (HCC)     Withdrawal symptoms, drug or narcotic (HCC)      Past Surgical History:   Procedure Laterality Date    ABDOMINAL SURGERY      APPENDECTOMY      BOWEL RESECTION      CHOLECYSTECTOMY      ESOPHAGOGASTRODUODENOSCOPY N/A 05/18/2018    Procedure: ESOPHAGOGASTRODUODENOSCOPY (EGD) with bx;  Surgeon: Lulu West DO;  Location: [facility];  Service: Gastroenterology    HYSTERECTOMY      KNEE SURGERY Bilateral 1991     Allergies   Allergen Reactions    Losartan Cough    Latex Rash     Current Outpatient Medications on File Prior to Visit   Medication Sig Dispense Refill    Alcohol Swabs 70 % PADS May substitute brand based on insurance coverage. Check glucose TID. 100 each 0    aluminum-magnesium hydroxide-simethicone (MAALOX) 8058-3914-639 mg/30 mL suspension Take 30 mL by mouth every 4 (four) hours as needed for  indigestion or heartburn (gas) 355 mL 0    ARIPiprazole (ABILIFY) 10 mg tablet Take 1 tablet (10 mg total) by mouth daily 30 tablet 0    aspirin 81 mg chewable tablet Chew 1 tablet (81 mg total) daily Do not start before September 7, 2023. 30 tablet 0    atorvastatin (LIPITOR) 20 mg tablet Take 1 tablet (20 mg total) by mouth daily 90 tablet 1    Blood Glucose Monitoring Suppl (OneTouch Verio Reflect) w/Device KIT May substitute brand based on insurance coverage. Check glucose TID. 1 kit 0    clonazePAM (KlonoPIN) 0.5 mg tablet Take 1 tablet (0.5 mg total) by mouth daily as needed for anxiety 30 tablet 0    dicyclomine (BENTYL) 10 mg capsule Take 2 capsules (20 mg total) by mouth 3 (three) times a day as needed (Cramping) 20 capsule 0    docusate sodium (COLACE) 100 mg capsule Take 1 capsule (100 mg total) by mouth 2 (two) times a day for 14 days 28 capsule 0    docusate sodium (COLACE) 100 mg capsule Take 1 capsule (100 mg total) by mouth 2 (two) times a day (Patient not taking: Reported on 12/12/2023) 60 capsule 0    ergocalciferol (VITAMIN D2) 50,000 units TAKE 1 CAPSULE BY MOUTH ONCE A  WEEK 12 capsule 1    escitalopram (LEXAPRO) 10 mg tablet TAKE 1 TABLET(10 MG) BY MOUTH DAILY 90 tablet 1    gabapentin (NEURONTIN) 800 mg tablet TAKE 1 TABLET BY MOUTH 3 TIMES  DAILY 270 tablet 1    hydrochlorothiazide (HYDRODIURIL) 12.5 mg tablet TAKE 1 TABLET(12.5 MG) BY MOUTH DAILY 90 tablet 1    insulin glargine (LANTUS) 100 units/mL subcutaneous injection Inject 25 Units under the skin daily at bedtime 10 mL 0    levothyroxine 25 mcg tablet TAKE 1 TABLET(25 MCG) BY MOUTH DAILY IN THE EARLY MORNING 90 tablet 3    linaCLOtide 145 MCG CAPS Take 1 capsule (145 mcg total) by mouth in the morning 30 capsule 3    metFORMIN (GLUCOPHAGE) 1000 MG tablet TAKE 1 TABLET(1000 MG) BY MOUTH TWICE DAILY WITH MEALS 180 tablet 1    metoprolol tartrate (LOPRESSOR) 50 mg tablet TAKE 1 TABLET BY MOUTH EVERY 12  HOURS 180 tablet 1    Microlet  Lancets MISC       ondansetron (ZOFRAN) 4 mg tablet Take 1 tablet (4 mg total) by mouth every 8 (eight) hours as needed for nausea or vomiting 30 tablet 3    OneTouch Delica Lancets 33G MISC May substitute brand based on insurance coverage. Check glucose TID. 100 each 0    pantoprazole (PROTONIX) 40 mg tablet Take 1 tablet (40 mg total) by mouth 2 (two) times a day 180 tablet 2    polyethylene glycol (MIRALAX) 17 g packet Take 17 g by mouth daily 30 each 0    senna (SENOKOT) 8.6 mg Take 1 tablet (8.6 mg total) by mouth daily Do not start before October 20, 2023. 30 tablet 0    sucralfate (CARAFATE) 1 g tablet TAKE 1 TABLET BY MOUTH 4 TIMES  DAILY 360 tablet 1    traZODone (DESYREL) 100 mg tablet Take 2 tablets (200 mg total) by mouth daily at bedtime 60 tablet 2    Ubrogepant (UBRELVY) 50 MG tablet Take 1 tablet (50 mg) one time as needed for migraine. May repeat one additional tablet (50 mg) at least two hours after the first dose. Do not use more than two doses per day, or for more than eight days per month. 8 tablet 0    [DISCONTINUED] ergocalciferol (VITAMIN D2) 50,000 units TAKE 1 CAPSULE BY MOUTH 1 TIME A WEEK 12 capsule 1    [DISCONTINUED] gabapentin (NEURONTIN) 800 mg tablet TAKE 1 TABLET BY MOUTH 3 TIMES  DAILY 90 tablet 5    [DISCONTINUED] metoprolol tartrate (LOPRESSOR) 50 mg tablet TAKE 1 TABLET BY MOUTH EVERY 12  HOURS 60 tablet 5     No current facility-administered medications on file prior to visit.              Objective:    Review of Systems   Constitutional:  Negative for chills and fever.   HENT:  Negative for ear pain and sore throat.    Eyes:  Negative for pain and visual disturbance.   Respiratory:  Negative for cough and shortness of breath.    Cardiovascular:  Negative for chest pain and palpitations.   Gastrointestinal:  Negative for abdominal pain and vomiting.   Genitourinary:  Negative for dysuria and hematuria.   Musculoskeletal:  Negative for arthralgias and back pain.   Skin:   Negative for color change and rash.   Neurological:  Negative for seizures and syncope.   All other systems reviewed and are negative.      Right Knee Exam     Tests   Varus: negative Valgus: negative    Other   Erythema: absent  Scars: present  Sensation: normal  Pulse: present  Swelling: none  Effusion: effusion present    Comments:  Patient can not extend her knee due to pain  Extensor tendon intact  -15 degrees of passive extension              Physical Exam  Vitals and nursing note reviewed.   HENT:      Head: Normocephalic.   Eyes:      Extraocular Movements: Extraocular movements intact.   Cardiovascular:      Rate and Rhythm: Normal rate.      Pulses: Normal pulses.   Pulmonary:      Effort: Pulmonary effort is normal.   Musculoskeletal:         General: Normal range of motion.      Cervical back: Normal range of motion.      Right knee: Effusion present.      Comments: See ortho exam   Skin:     General: Skin is warm and dry.   Neurological:      General: No focal deficit present.      Mental Status: She is alert.   Psychiatric:         Behavior: Behavior normal.         Procedures   Superolateral aspect of the patella was prepped with alcohol then local anesthetic of 5 cc of lidocaine was injected waited 5 to 10 minutes came back performed an arthrocentesis using 18-gauge needle was able to aspirate 40 cc of serosanguineous fluid.  A Band-Aid was applied offered a cortisone injection patient declined.    I have personally reviewed pertinent films in PACS.    X-ray of the right knee obtained 12/13/2023 demonstrates moderate degenerative changes. Joint space loss in the medial compartment, subchondral sclerosis and osteophytosis.     Scribe Attestation      I,:  Raine Durham am acting as a scribe while in the presence of the attending physician.:       I,:  Lavon Chavis DO personally performed the services described in this documentation    as scribed in my presence.:             Portions of the record  "may have been created with voice recognition software.  Occasional wrong word or \"sound a like\" substitutions may have occurred due to the inherent limitations of voice recognition software.  Read the chart carefully and recognize, using context, where substitutions have occurred.    "

## 2024-01-06 ENCOUNTER — TELEPHONE (OUTPATIENT)
Dept: LAB | Facility: HOSPITAL | Age: 59
End: 2024-01-06

## 2024-01-06 NOTE — TELEPHONE ENCOUNTER
This patient had crp and sed rate orders pulled during office visit. These tests require blood not body fluid. Please call patient if blood test needed and also provide new orders. Any questions please call the lab

## 2024-01-07 LAB
BACTERIA SPEC BFLD CULT: NO GROWTH
GRAM STN SPEC: NORMAL
GRAM STN SPEC: NORMAL

## 2024-01-08 LAB
BACTERIA SPEC BFLD CULT: NO GROWTH
GRAM STN SPEC: NORMAL
GRAM STN SPEC: NORMAL

## 2024-01-10 ENCOUNTER — TELEPHONE (OUTPATIENT)
Age: 59
End: 2024-01-10

## 2024-01-10 ENCOUNTER — PATIENT OUTREACH (OUTPATIENT)
Dept: FAMILY MEDICINE CLINIC | Facility: CLINIC | Age: 59
End: 2024-01-10

## 2024-01-10 LAB — B BURGDOR DNA SPEC QL NAA+PROBE: NEGATIVE

## 2024-01-10 NOTE — TELEPHONE ENCOUNTER
Pt called because she thinks she got a message from the office. I did see that she already spoke to someone today but I did not see any other recent outreach. Please, call pt if further contact is needed.     I could not addend other message.

## 2024-01-10 NOTE — PROGRESS NOTES
I called Bhavana to ask how her appointment for a G POEM procedure went.Patient reports she had to cancel the appointment due to pain in her right knee.She was seen by orthopedics on 1/5 and multiple lab orders and an MRI was scheduled. I advised her that until her tests results are in she will have to use OTC pain relief. I advised her I will assist her with rescheduling her procedure after her orthopedic problem is taken care of.She reports she cannot sit in a car for a long trip due to the pain in her right knee. She asked if I can call her on a regular basis for emotional support. I will continue to follow. Her speech is very thick today during outreach.

## 2024-01-10 NOTE — TELEPHONE ENCOUNTER
Caller: Mr Titus    Doctor: na    Reason for call: Patient received a call from . Stated there was no message. Advised it was from  [facility]    Call back#: [phone]

## 2024-01-19 ENCOUNTER — PATIENT OUTREACH (OUTPATIENT)
Dept: FAMILY MEDICINE CLINIC | Facility: CLINIC | Age: 59
End: 2024-01-19

## 2024-01-19 NOTE — PROGRESS NOTES
I spoke with Ellen. She reports she has pain in her right knee. She had an appointment for an MRI on 1/10 but she cancelled it. She reports she rescheduled it for 1/22. I encouraged her to have it done so the orthopedic surgeon can recommend a plan of treatment. She understands and agrees.  I will outreach next week.

## 2024-01-21 DIAGNOSIS — I10 PRIMARY HYPERTENSION: ICD-10-CM

## 2024-01-21 DIAGNOSIS — F31.4 BIPOLAR DISORDER, CURRENT EPISODE DEPRESSED, SEVERE, WITHOUT PSYCHOTIC FEATURES (HCC): ICD-10-CM

## 2024-01-22 ENCOUNTER — HOSPITAL ENCOUNTER (OUTPATIENT)
Dept: MRI IMAGING | Facility: HOSPITAL | Age: 59
Discharge: HOME | End: 2024-01-22
Attending: ORTHOPAEDIC SURGERY
Payer: COMMERCIAL

## 2024-01-22 ENCOUNTER — TELEPHONE (OUTPATIENT)
Age: 59
End: 2024-01-22

## 2024-01-22 DIAGNOSIS — F43.10 PTSD (POST-TRAUMATIC STRESS DISORDER): ICD-10-CM

## 2024-01-22 DIAGNOSIS — M25.561 CHRONIC PAIN OF RIGHT KNEE: ICD-10-CM

## 2024-01-22 DIAGNOSIS — M17.11 PRIMARY OSTEOARTHRITIS OF RIGHT KNEE: ICD-10-CM

## 2024-01-22 DIAGNOSIS — G89.29 CHRONIC PAIN OF RIGHT KNEE: ICD-10-CM

## 2024-01-22 PROCEDURE — G1004 CDSM NDSC: HCPCS

## 2024-01-22 PROCEDURE — 73721 MRI JNT OF LWR EXTRE W/O DYE: CPT

## 2024-01-22 RX ORDER — HYDROCHLOROTHIAZIDE 12.5 MG/1
12.5 TABLET ORAL DAILY
Qty: 90 TABLET | Refills: 1 | Status: SHIPPED | OUTPATIENT
Start: 2024-01-22

## 2024-01-22 RX ORDER — TRAZODONE HYDROCHLORIDE 100 MG/1
200 TABLET ORAL
Qty: 60 TABLET | Refills: 5 | Status: SHIPPED | OUTPATIENT
Start: 2024-01-22

## 2024-01-22 NOTE — TELEPHONE ENCOUNTER
Caller: patient    Doctor:     Reason for call: calling for knee pain, ref to ortho    Call back#:

## 2024-01-24 ENCOUNTER — TELEPHONE (OUTPATIENT)
Dept: OBGYN CLINIC | Facility: HOSPITAL | Age: 59
End: 2024-01-24

## 2024-01-24 DIAGNOSIS — M17.11 PRIMARY OSTEOARTHRITIS OF RIGHT KNEE: Primary | ICD-10-CM

## 2024-01-24 RX ORDER — METHYLPREDNISOLONE 4 MG/1
TABLET ORAL
Qty: 21 TABLET | Refills: 0 | Status: SHIPPED | OUTPATIENT
Start: 2024-01-24

## 2024-01-24 NOTE — TELEPHONE ENCOUNTER
Called and spoke w/pt and she states that she called Pain Management and they do not do anything of knees. She had MRI (no results yet) done on Monday. Has appt on 1/29/24 to go over results.  Re-iterated to pt X2 that she needs to call PCP and make sure it ti ok to take Medrol dose pack. Advised pt to take w/food. CB w/any further questions/concerns.

## 2024-01-24 NOTE — TELEPHONE ENCOUNTER
Caller: Lavon (Significant Other    Doctor: Partha    Reason for call: Asking what patient can take other then tylenol/Advil//Lidocaine Patches for pain?  She does have a gastro problem, but stated that seems to have resolved. Wondering if anything can be prescribed for pain. Patient knee filling back up with fluid said looks like 3 gold balls on her knee offered this week refused, patient scheduled Monday.     Call back#: [phone] (patient call back number)

## 2024-01-25 ENCOUNTER — TELEPHONE (OUTPATIENT)
Age: 59
End: 2024-01-25

## 2024-01-25 DIAGNOSIS — F31.4 BIPOLAR DISORDER, CURRENT EPISODE DEPRESSED, SEVERE, WITHOUT PSYCHOTIC FEATURES (HCC): ICD-10-CM

## 2024-01-25 DIAGNOSIS — G43.009 MIGRAINE WITHOUT AURA AND WITHOUT STATUS MIGRAINOSUS, NOT INTRACTABLE: Primary | ICD-10-CM

## 2024-01-25 RX ORDER — CLONAZEPAM 0.5 MG/1
0.5 TABLET ORAL DAILY PRN
Qty: 30 TABLET | Refills: 0 | Status: CANCELLED | OUTPATIENT
Start: 2024-01-25

## 2024-01-25 RX ORDER — SUMATRIPTAN 25 MG/1
25 TABLET, FILM COATED ORAL DAILY PRN
Qty: 10 TABLET | Refills: 1 | Status: SHIPPED | OUTPATIENT
Start: 2024-01-25

## 2024-01-25 NOTE — TELEPHONE ENCOUNTER
Patient called the RX Refill Line. Message is being forwarded to the office.     Patient is requesting that she be prescribed Imitrex 50 mg again. The current medication that was prescribed is still too expensive. Only has one left and is dealing with a migraine.    Please contact patient at [phone] to let her know it was called into the pharmacy.

## 2024-01-25 NOTE — TELEPHONE ENCOUNTER
Reason for call:   [x] Refill   [] Prior Auth  [] Other:     Office:   [x] PCP/Provider -   [] Specialty/Provider -     Medication: Klonopin    Dose/Frequency: 0.5 mg     Quantity: #30    Pharmacy: [facility]    Does the patient have enough for 3 days?   [] Yes   [x] No - Send as HP to POD

## 2024-01-25 NOTE — TELEPHONE ENCOUNTER
Patient requesting refill.    Reason for call:   [x] Refill   [] Prior Auth  [] Other:     Office:   [x] PCP/Provider -   [] Specialty/Provider -     Medication:   clonazePAM (KlonoPIN) 0.5 mg tablet     Dose/Frequency: Take 1 tablet (0.5 mg total) by mouth daily as needed for anxiety     Quantity: 30    Pharmacy: [facility]    Does the patient have enough for 3 days?   [x] Yes   [] No - Send as HP to POD

## 2024-01-26 RX ORDER — CLONAZEPAM 0.5 MG/1
TABLET ORAL
Qty: 30 TABLET | Refills: 0 | Status: SHIPPED | OUTPATIENT
Start: 2024-01-26

## 2024-01-29 ENCOUNTER — OFFICE VISIT (OUTPATIENT)
Dept: OBGYN CLINIC | Facility: CLINIC | Age: 59
End: 2024-01-29
Payer: COMMERCIAL

## 2024-01-29 VITALS — BODY MASS INDEX: 29.77 KG/M2 | WEIGHT: 168 LBS | HEIGHT: 63 IN

## 2024-01-29 DIAGNOSIS — M25.561 CHRONIC PAIN OF RIGHT KNEE: ICD-10-CM

## 2024-01-29 DIAGNOSIS — S83.281D OTHER TEAR OF LATERAL MENISCUS OF RIGHT KNEE AS CURRENT INJURY, SUBSEQUENT ENCOUNTER: ICD-10-CM

## 2024-01-29 DIAGNOSIS — Z98.890 HISTORY OF KNEE SURGERY: ICD-10-CM

## 2024-01-29 DIAGNOSIS — M25.461 EFFUSION OF RIGHT KNEE: ICD-10-CM

## 2024-01-29 DIAGNOSIS — G89.29 CHRONIC PAIN OF RIGHT KNEE: ICD-10-CM

## 2024-01-29 DIAGNOSIS — M17.11 PRIMARY OSTEOARTHRITIS OF RIGHT KNEE: Primary | ICD-10-CM

## 2024-01-29 PROCEDURE — 99213 OFFICE O/P EST LOW 20 MIN: CPT | Performed by: ORTHOPAEDIC SURGERY

## 2024-01-29 NOTE — PROGRESS NOTES
Assessment:  1. Primary osteoarthritis of right knee  Ambulatory Referral to Orthopedic Surgery      2. Chronic pain of right knee  Ambulatory Referral to Orthopedic Surgery      3. Effusion of right knee  Ambulatory Referral to Orthopedic Surgery      4. Other tear of lateral meniscus of right knee as current injury, subsequent encounter  Ambulatory Referral to Orthopedic Surgery      5. History of knee surgery  Ambulatory Referral to Orthopedic Surgery        Patient Active Problem List   Diagnosis    Abdominal pain    Essential hypertension    Nausea and vomiting    Alcohol abuse, in remission    Post-traumatic stress disorder, chronic    Generalized anxiety disorder    Elevated lactic acid level    Bipolar disorder current episode depressed ([facility])    Bipolar 1 disorder, depressed ([facility])    Benzodiazepine dependence, continuous ([facility])    Non compliance w medication regimen    Acquired hypothyroidism    Hypokalemia    Hyperlipemia    Transaminitis    Cognitive dysfunction in bipolar disorder ([facility])    Tobacco abuse    Gastroesophageal reflux disease without esophagitis    Diabetes mellitus type 2 in obese     Primary hypertension    Hypothyroidism    Vitamin D deficiency    Hypertensive urgency    Cigarette smoker    Chronic cough    Pulmonary emphysema, unspecified emphysema type ([facility])    Migraine without aura and without status migrainosus, not intractable    Diabetic gastroparesis     Constipation    Bloated abdomen    Hx of adenomatous colonic polyps    Primary osteoarthritis of right knee    Chronic pain of right knee    Effusion of right knee           Plan      58 y.o. female with chronic severe right knee pain. Review of MRI demonstrates tricompartmental osteoarthritis and meniscus tear. Patient has undergone multiple surgeries to her right knee patella. Due to this, patients age and nature of underlying disease a referral to Dr Vincent was provided to discuss total knee arthroplasty. I do not think patient  would benefit from arthroscopic surgery at this time.             Subjective:     Patient ID:    Chief Complaint:Bhavana Smith 58 y.o. female      HPI    Patient presents for follow up evaluation of right knee pain and MRI review. Patient has severe 10/10 pain in the anterior lateral aspect of her knee. It impacts her daily life, she can not weight bear.  Prior history of multiple patella surgeries to correct a bony abnormality, however she is unable to recall what procedure was performed. Procedures were approximately 30 years ago.  Dr Duval administered a corticosteroid injection into the right knee 1/9/2023, patient states provided no benefit and caused her knee to give out later that day. An aspiration was performed at her last visit 1/5/2024, labs came back unremarkable. Patient declined CSI.       The following portions of the patient's history were reviewed and updated as appropriate: allergies, current medications, past family history, past social history, past surgical history and problem list.        Social History     Socioeconomic History    Marital status:      Spouse name: Not on file    Number of children: Not on file    Years of education: Not on file    Highest education level: Not on file   Occupational History    Not on file   Tobacco Use    Smoking status: Every Day     Current packs/day: 1.00     Average packs/day: 1 pack/day for 25.0 years (25.0 ttl pk-yrs)     Types: Cigarettes     Passive exposure: Current    Smokeless tobacco: Never    Tobacco comments:     smokes like 5 a day(06/24/2022)   Vaping Use    Vaping status: Never Used   Substance and Sexual Activity    Alcohol use: Not Currently     Alcohol/week: 6.0 standard drinks of alcohol     Types: 6 Cans of beer per week     Comment: last drink on 08/15/20    Drug use: Never    Sexual activity: Not Currently     Partners: Male     Birth control/protection: None   Other Topics Concern    Not on file   Social History Narrative     Not on file     Social Determinants of Health     Financial Resource Strain: Not on file   Food Insecurity: No Food Insecurity (10/17/2023)    Hunger Vital Sign     Worried About Running Out of Food in the Last Year: Never true     Ran Out of Food in the Last Year: Never true   Transportation Needs: No Transportation Needs (10/17/2023)    PRAPARE - Transportation     Lack of Transportation (Medical): No     Lack of Transportation (Non-Medical): No   Physical Activity: Not on file   Stress: Not on file   Social Connections: Not on file   Intimate Partner Violence: Not on file   Housing Stability: Low Risk  (10/17/2023)    Housing Stability Vital Sign     Unable to Pay for Housing in the Last Year: No     Number of Places Lived in the Last Year: 1     Unstable Housing in the Last Year: No     Past Medical History:   Diagnosis Date    Addiction to drug (HCC)     Adjustment disorder     Alcohol abuse     Alcoholism (HCC)     Anxiety     Bipolar disorder (HCC)     Bowel obstruction (HCC)     Depression     Diabetes type 2, controlled (HCC)     Disease of thyroid gland     Gastritis     Head injury     Heart palpitations     Hyperlipidemia     Hypertension     Hypothyroidism     Psychiatric illness     PTSD (post-traumatic stress disorder)     Seizure (HCC)     Seizures (HCC)     Sleep difficulties     Substance abuse (HCC)     Suicide attempt (HCC)     Withdrawal symptoms, drug or narcotic (HCC)      Past Surgical History:   Procedure Laterality Date    ABDOMINAL SURGERY      APPENDECTOMY      BOWEL RESECTION      CHOLECYSTECTOMY      ESOPHAGOGASTRODUODENOSCOPY N/A 05/18/2018    Procedure: ESOPHAGOGASTRODUODENOSCOPY (EGD) with bx;  Surgeon: Lulu West DO;  Location: [facility];  Service: Gastroenterology    HYSTERECTOMY      KNEE SURGERY Bilateral 1991     Allergies   Allergen Reactions    Losartan Cough    Latex Rash     Current Outpatient Medications on File Prior to Visit   Medication Sig Dispense Refill     Alcohol Swabs 70 % PADS May substitute brand based on insurance coverage. Check glucose TID. 100 each 0    aluminum-magnesium hydroxide-simethicone (MAALOX) 9929-4486-301 mg/30 mL suspension Take 30 mL by mouth every 4 (four) hours as needed for indigestion or heartburn (gas) 355 mL 0    ARIPiprazole (ABILIFY) 10 mg tablet Take 1 tablet (10 mg total) by mouth daily 30 tablet 0    atorvastatin (LIPITOR) 20 mg tablet Take 1 tablet (20 mg total) by mouth daily 90 tablet 1    Blood Glucose Monitoring Suppl (OneTouch Verio Reflect) w/Device KIT May substitute brand based on insurance coverage. Check glucose TID. 1 kit 0    clonazePAM (KlonoPIN) 0.5 mg tablet TAKE 1 TABLET(0.5 MG) BY MOUTH DAILY AS NEEDED FOR ANXIETY 30 tablet 0    dicyclomine (BENTYL) 10 mg capsule Take 2 capsules (20 mg total) by mouth 3 (three) times a day as needed (Cramping) 20 capsule 0    ergocalciferol (VITAMIN D2) 50,000 units TAKE 1 CAPSULE BY MOUTH ONCE A  WEEK 12 capsule 1    escitalopram (LEXAPRO) 10 mg tablet TAKE 1 TABLET(10 MG) BY MOUTH DAILY 90 tablet 1    gabapentin (NEURONTIN) 800 mg tablet TAKE 1 TABLET BY MOUTH 3 TIMES  DAILY 270 tablet 1    hydrochlorothiazide (HYDRODIURIL) 12.5 mg tablet TAKE 1 TABLET BY MOUTH DAILY 90 tablet 1    insulin glargine (LANTUS) 100 units/mL subcutaneous injection Inject 25 Units under the skin daily at bedtime 10 mL 0    levothyroxine 25 mcg tablet TAKE 1 TABLET(25 MCG) BY MOUTH DAILY IN THE EARLY MORNING 90 tablet 3    metFORMIN (GLUCOPHAGE) 1000 MG tablet TAKE 1 TABLET BY MOUTH TWICE  DAILY WITH MEALS 180 tablet 1    methylPREDNISolone 4 MG tablet therapy pack Use as directed on package 21 tablet 0    metoprolol tartrate (LOPRESSOR) 50 mg tablet TAKE 1 TABLET BY MOUTH EVERY 12  HOURS 180 tablet 1    Microlet Lancets MISC       ondansetron (ZOFRAN) 4 mg tablet Take 1 tablet (4 mg total) by mouth every 8 (eight) hours as needed for nausea or vomiting 30 tablet 3    OneTouch Delica Lancets 33G MISC May  substitute brand based on insurance coverage. Check glucose TID. 100 each 0    pantoprazole (PROTONIX) 40 mg tablet Take 1 tablet (40 mg total) by mouth 2 (two) times a day 180 tablet 2    polyethylene glycol (MIRALAX) 17 g packet Take 17 g by mouth daily 30 each 0    senna (SENOKOT) 8.6 mg Take 1 tablet (8.6 mg total) by mouth daily Do not start before October 20, 2023. 30 tablet 0    sucralfate (CARAFATE) 1 g tablet TAKE 1 TABLET BY MOUTH 4 TIMES  DAILY 360 tablet 1    SUMAtriptan (Imitrex) 25 mg tablet Take 1 tablet (25 mg total) by mouth daily as needed for migraine Do not take more than 3 doses in 1 week 10 tablet 1    traZODone (DESYREL) 100 mg tablet TAKE 2 TABLETS(200 MG) BY MOUTH DAILY AT BEDTIME 60 tablet 5    aspirin 81 mg chewable tablet Chew 1 tablet (81 mg total) daily Do not start before September 7, 2023. 30 tablet 0    docusate sodium (COLACE) 100 mg capsule Take 1 capsule (100 mg total) by mouth 2 (two) times a day for 14 days 28 capsule 0    docusate sodium (COLACE) 100 mg capsule Take 1 capsule (100 mg total) by mouth 2 (two) times a day (Patient not taking: Reported on 12/12/2023) 60 capsule 0    linaCLOtide 145 MCG CAPS Take 1 capsule (145 mcg total) by mouth in the morning 30 capsule 3     No current facility-administered medications on file prior to visit.              Objective:    Review of Systems   Constitutional:  Negative for chills and fever.   HENT:  Negative for ear pain and sore throat.    Eyes:  Negative for pain and visual disturbance.   Respiratory:  Negative for cough and shortness of breath.    Cardiovascular:  Negative for chest pain and palpitations.   Gastrointestinal:  Negative for abdominal pain and vomiting.   Genitourinary:  Negative for dysuria and hematuria.   Musculoskeletal:  Negative for arthralgias and back pain.   Skin:  Negative for color change and rash.   Neurological:  Negative for seizures and syncope.   All other systems reviewed and are negative.      Right  "Knee Exam     Tests   Varus: negative Valgus: negative    Other   Erythema: absent  Sensation: normal  Pulse: present  Swelling: none  Effusion: effusion present    Comments:  TTP anterior lateral joint line            Physical Exam  Vitals and nursing note reviewed.   HENT:      Head: Normocephalic.   Eyes:      Extraocular Movements: Extraocular movements intact.   Cardiovascular:      Rate and Rhythm: Normal rate.      Pulses: Normal pulses.   Pulmonary:      Effort: Pulmonary effort is normal.   Musculoskeletal:         General: Normal range of motion.      Cervical back: Normal range of motion.      Right knee: Effusion present.   Skin:     General: Skin is warm and dry.   Neurological:      General: No focal deficit present.      Mental Status: She is alert.   Psychiatric:         Behavior: Behavior normal.         Procedures  No Procedures performed today    I have personally reviewed pertinent films in PACS.  MRI of right knee obtained 1/22/2024 demonstrates moderate tricompartmental osteoarthritis. Small longitudinal tear through the anterior root of the lateral meniscus. Degenerative fraying of the posterior horn of the medial meniscus.     Scribe Attestation      I,:  Raine Durham am acting as a scribe while in the presence of the attending physician.:       I,:  Lavon Chavis DO personally performed the services described in this documentation    as scribed in my presence.:               Portions of the record may have been created with voice recognition software.  Occasional wrong word or \"sound a like\" substitutions may have occurred due to the inherent limitations of voice recognition software.  Read the chart carefully and recognize, using context, where substitutions have occurred.    "

## 2024-01-30 DIAGNOSIS — E03.9 HYPOTHYROIDISM: ICD-10-CM

## 2024-01-31 DIAGNOSIS — K29.70 GASTRITIS WITHOUT BLEEDING, UNSPECIFIED CHRONICITY, UNSPECIFIED GASTRITIS TYPE: ICD-10-CM

## 2024-01-31 RX ORDER — SUCRALFATE 1 G/1
1 TABLET ORAL 4 TIMES DAILY
Qty: 400 TABLET | Refills: 1 | Status: SHIPPED | OUTPATIENT
Start: 2024-01-31

## 2024-01-31 RX ORDER — LEVOTHYROXINE SODIUM 0.03 MG/1
25 TABLET ORAL
Qty: 100 TABLET | Refills: 0 | Status: SHIPPED | OUTPATIENT
Start: 2024-01-31 | End: 2024-02-05 | Stop reason: SDUPTHER

## 2024-02-02 ENCOUNTER — PATIENT OUTREACH (OUTPATIENT)
Dept: CASE MANAGEMENT | Facility: HOSPITAL | Age: 59
End: 2024-02-02

## 2024-02-02 NOTE — PROGRESS NOTES
Pt due for care management outreach. Call placed to Bhavana. She was able to reschedule MRI and complete. Reviewed follow up appointment with Ortho. She reports she has meniscus tears and has an appointment with surgery for knee replacement. She had knee drained and states that provided her with some relief. Before aspiration was unable to bend knee and had trouble getting up from sitting position. Continues to still report pain, however is managing.  Confirmed upcoming appointment 3/5 with ortho.     Bhavana states that she has not had thyroid medication for four days. Confirmed per notes PCP placed refill orders 1/31 and she will also need to have follow up labs for same. She is appreciative. Has all other medications. Denies other needs at this time.     Informed pt I am covering MARK ANTHONY Arguelles CM who is OOO today but will continue follow up.   Note routed to ANNEL.

## 2024-02-05 ENCOUNTER — TELEPHONE (OUTPATIENT)
Age: 59
End: 2024-02-05

## 2024-02-05 DIAGNOSIS — E03.9 HYPOTHYROIDISM: ICD-10-CM

## 2024-02-05 DIAGNOSIS — M17.11 PRIMARY OSTEOARTHRITIS OF RIGHT KNEE: Primary | ICD-10-CM

## 2024-02-05 RX ORDER — LEVOTHYROXINE SODIUM 0.03 MG/1
25 TABLET ORAL
Qty: 90 TABLET | Refills: 1 | Status: SHIPPED | OUTPATIENT
Start: 2024-02-05

## 2024-02-05 NOTE — TELEPHONE ENCOUNTER
Caller: Patient    Doctor: Partha    Reason for call: Patient requested a script for R knee pain until her 3/5 appt with Dr. Vincent.  She stated tylenol and lidocaine spray are not helping her.    Please advise.    Call back#: [phone]

## 2024-02-05 NOTE — TELEPHONE ENCOUNTER
Reason for call:   [x] Refill   [] Prior Auth  [x] Other: THIS IS NOT A DUPLICATE. THIS WAS SENT TO [facility], BUT THE PATIENT WOULD LIKE THIS SENT TO HER LOCAL PHARMACY [facility] BECAUSE SHE HAS BEEN OUT OF MEDICATION FOR A WEEK NOW.    Office:   [x] PCP/Provider -   [] Specialty/Provider -     Medication: levothyroxine 25 mcg tablet     Quantity: #90    Pharmacy: levothyroxine 25 mcg tablet     Does the patient have enough for 3 days?   [] Yes   [x] No - Send as HP to POD

## 2024-02-05 NOTE — TELEPHONE ENCOUNTER
Caller: Patient    Doctor: Partha    Reason for call: Patient is asking if you can send pain medication to her pharmacy. She is having a lot of rt knee pain. She has an appointment scheduled with Dr. Vincent on 3/5/24.    Call back#: [phone]    [facility] [employer] #[id number] [address]

## 2024-02-20 ENCOUNTER — PATIENT OUTREACH (OUTPATIENT)
Dept: FAMILY MEDICINE CLINIC | Facility: CLINIC | Age: 59
End: 2024-02-20

## 2024-02-20 NOTE — PROGRESS NOTES
"I spoke with patient who reports the Voltaren cream prescribed by orthopedic surgeon \"helps a little\". She has an appointment with ortho on 3/5. She reports she is trying to cope because \"that's all I can do\".   I advised her I will call her next week.  "

## 2024-03-05 ENCOUNTER — OFFICE VISIT (OUTPATIENT)
Dept: OBGYN CLINIC | Facility: MEDICAL CENTER | Age: 59
End: 2024-03-05
Payer: COMMERCIAL

## 2024-03-05 ENCOUNTER — PATIENT OUTREACH (OUTPATIENT)
Dept: FAMILY MEDICINE CLINIC | Facility: CLINIC | Age: 59
End: 2024-03-05

## 2024-03-05 ENCOUNTER — APPOINTMENT (OUTPATIENT)
Dept: RADIOLOGY | Facility: MEDICAL CENTER | Age: 59
End: 2024-03-05
Payer: COMMERCIAL

## 2024-03-05 VITALS
SYSTOLIC BLOOD PRESSURE: 188 MMHG | DIASTOLIC BLOOD PRESSURE: 91 MMHG | BODY MASS INDEX: 30.3 KG/M2 | HEART RATE: 75 BPM | HEIGHT: 63 IN | WEIGHT: 171 LBS

## 2024-03-05 DIAGNOSIS — M25.59 PAIN IN OTHER JOINT: ICD-10-CM

## 2024-03-05 DIAGNOSIS — S83.281D OTHER TEAR OF LATERAL MENISCUS OF RIGHT KNEE AS CURRENT INJURY, SUBSEQUENT ENCOUNTER: ICD-10-CM

## 2024-03-05 DIAGNOSIS — M25.461 EFFUSION OF RIGHT KNEE: ICD-10-CM

## 2024-03-05 DIAGNOSIS — M17.11 PRIMARY OSTEOARTHRITIS OF RIGHT KNEE: ICD-10-CM

## 2024-03-05 DIAGNOSIS — Z01.89 ENCOUNTER FOR LOWER EXTREMITY COMPARISON IMAGING STUDY: ICD-10-CM

## 2024-03-05 DIAGNOSIS — Z01.89 ENCOUNTER FOR LOWER EXTREMITY COMPARISON IMAGING STUDY: Primary | ICD-10-CM

## 2024-03-05 DIAGNOSIS — Z98.890 HISTORY OF KNEE SURGERY: ICD-10-CM

## 2024-03-05 DIAGNOSIS — G89.29 CHRONIC PAIN OF RIGHT KNEE: ICD-10-CM

## 2024-03-05 DIAGNOSIS — M25.561 CHRONIC PAIN OF RIGHT KNEE: ICD-10-CM

## 2024-03-05 PROCEDURE — 77073 BONE LENGTH STUDIES: CPT

## 2024-03-05 PROCEDURE — 99215 OFFICE O/P EST HI 40 MIN: CPT | Performed by: STUDENT IN AN ORGANIZED HEALTH CARE EDUCATION/TRAINING PROGRAM

## 2024-03-05 RX ORDER — FOLIC ACID 1 MG/1
1 TABLET ORAL DAILY
Qty: 30 TABLET | Refills: 1 | Status: SHIPPED | OUTPATIENT
Start: 2024-03-05

## 2024-03-05 RX ORDER — MELATONIN
2000 DAILY
Qty: 60 TABLET | Refills: 1 | Status: SHIPPED | OUTPATIENT
Start: 2024-03-05

## 2024-03-05 RX ORDER — TRANEXAMIC ACID 10 MG/ML
1000 INJECTION, SOLUTION INTRAVENOUS ONCE
OUTPATIENT
Start: 2024-03-05 | End: 2024-03-05

## 2024-03-05 RX ORDER — CHLORHEXIDINE GLUCONATE 4 G/100ML
SOLUTION TOPICAL DAILY PRN
OUTPATIENT
Start: 2024-03-05

## 2024-03-05 RX ORDER — CHLORHEXIDINE GLUCONATE ORAL RINSE 1.2 MG/ML
15 SOLUTION DENTAL ONCE
OUTPATIENT
Start: 2024-03-05 | End: 2024-03-05

## 2024-03-05 RX ORDER — SODIUM CHLORIDE, SODIUM LACTATE, POTASSIUM CHLORIDE, CALCIUM CHLORIDE 600; 310; 30; 20 MG/100ML; MG/100ML; MG/100ML; MG/100ML
125 INJECTION, SOLUTION INTRAVENOUS CONTINUOUS
OUTPATIENT
Start: 2024-03-05

## 2024-03-05 RX ORDER — ACETAMINOPHEN 325 MG/1
975 TABLET ORAL ONCE
OUTPATIENT
Start: 2024-03-05 | End: 2024-03-05

## 2024-03-05 RX ORDER — CEFAZOLIN SODIUM 2 G/50ML
2000 SOLUTION INTRAVENOUS ONCE
OUTPATIENT
Start: 2024-03-05 | End: 2024-03-05

## 2024-03-05 RX ORDER — MULTIVIT-MIN/IRON FUM/FOLIC AC 7.5 MG-4
1 TABLET ORAL DAILY
Qty: 30 TABLET | Refills: 1 | Status: SHIPPED | OUTPATIENT
Start: 2024-03-05

## 2024-03-05 RX ORDER — ASCORBIC ACID 500 MG
500 TABLET ORAL 2 TIMES DAILY
Qty: 60 TABLET | Refills: 1 | Status: SHIPPED | OUTPATIENT
Start: 2024-03-05

## 2024-03-05 NOTE — PROGRESS NOTES
Knee New Office Note    Assessment:     1. Encounter for lower extremity comparison imaging study    2. Primary osteoarthritis of right knee    3. Chronic pain of right knee    4. Effusion of right knee    5. Other tear of lateral meniscus of right knee as current injury, subsequent encounter    6. History of knee surgery        Plan:     Problem List Items Addressed This Visit          Musculoskeletal and Integument    Primary osteoarthritis of right knee    Effusion of right knee       Other    Chronic pain of right knee     Other Visit Diagnoses       Encounter for lower extremity comparison imaging study    -  Primary    Relevant Orders    XR bone length (scanogram)    Other tear of lateral meniscus of right knee as current injury, subsequent encounter        History of knee surgery               57 y/o female with right knee pain secondary to severe OA.  Xrays of the right knee reviewed today showing severe arthritic changes with decreased joint space, osteophyte formation, and varus alignment.  On physical exam she has significant limited range of motion with pain as well as pain over the medial joint line and a large re-accumulated effusion.  Discussed conservative treatment options to include cortisone injections, visco injections, oral NSAIDs, Tylenol, activity modifications, staying active with low impact exercises, and weight loss.  We also reviewed surgical treatment option of Right TKA, including procedure and recovery time after.  She has tried and failed treatment options including multiple injections, NSAIDs, PT, HEP, and activity modifications. Her pain is affecting her ADLs.    The patient has elected to proceed with Right TKA. Risks and benefits of surgery to include but not limited to bleeding, infection, damage to surrounding structures, hardware failure, instability, fracture, dislocation, need for further surgery, continued pain, stiffness, blood clots, stroke, and heart attack was discussed  with the patient. Informed consent was signed today in the office. The patient has met with our surgical schedulers and our preoperative joint replacement pathway has been initiated. All questions were answered. Patient will follow-up 2 weeks post operatively. BMI is appropriate at 30.29 and is cutting back on smoking and she will quit 2 weeks before and 2 weeks after surgery.      Subjective:     Patient ID: Bhavana Smith is a 58 y.o. female.  Chief Complaint:  HPI:  58 y.o. female who presents today for an evaluation of her RIGHT knee.  She has been having worsening, progressive pain in the right knee.  She has known OA of the right knee that she has been treating for conservatively with intermittent injections and NSAIDs.  She is unable to take Tylenol.  She states that she does have a prior history of multiple surgeries to correct bony abnormality of the patella, however she is unable to recall what procedure or who performed the procedure. She did well initially after this procedure, but recently her pain has returned. She gets intermittent effusions, which had had to be aspirated in the past. She was seen by Dr. Chavis who aspirated her knee and gave her a cortisone injection.  An MRI was ordered to further eval her knee and was told that she has severe OA with tearing of the menisci. She was sent for evaluation due to complex surgical history. She is here today to discuss TKA.      Allergy:  Allergies   Allergen Reactions    Losartan Cough    Latex Rash     Medications:  all current active meds have been reviewed  Past Medical History:  Past Medical History:   Diagnosis Date    Addiction to drug (HCC)     Adjustment disorder     Alcohol abuse     Alcoholism (HCC)     Anxiety     Bipolar disorder (HCC)     Bowel obstruction (HCC)     Depression     Diabetes type 2, controlled (HCC)     Disease of thyroid gland     Gastritis     Head injury     Heart palpitations     Hyperlipidemia     Hypertension      Hypothyroidism     Psychiatric illness     PTSD (post-traumatic stress disorder)     Seizure (HCC)     Seizures (HCC)     Sleep difficulties     Substance abuse (HCC)     Suicide attempt (HCC)     Withdrawal symptoms, drug or narcotic (HCC)      Past Surgical History:  Past Surgical History:   Procedure Laterality Date    ABDOMINAL SURGERY      APPENDECTOMY      BOWEL RESECTION      CHOLECYSTECTOMY      ESOPHAGOGASTRODUODENOSCOPY N/A 05/18/2018    Procedure: ESOPHAGOGASTRODUODENOSCOPY (EGD) with bx;  Surgeon: Lulu West DO;  Location: [facility];  Service: Gastroenterology    HYSTERECTOMY      KNEE SURGERY Bilateral 1991     Family History:  Family History   Problem Relation Age of Onset    Bipolar disorder Mother     Cancer Father     Diabetes Father      Social History:  Social History     Substance and Sexual Activity   Alcohol Use Not Currently    Alcohol/week: 6.0 standard drinks of alcohol    Types: 6 Cans of beer per week    Comment: last drink on 08/15/20     Social History     Substance and Sexual Activity   Drug Use Never     Social History     Tobacco Use   Smoking Status Every Day    Current packs/day: 1.00    Average packs/day: 1 pack/day for 25.0 years (25.0 ttl pk-yrs)    Types: Cigarettes    Passive exposure: Current   Smokeless Tobacco Never   Tobacco Comments    smokes like 5 a day(06/24/2022)           ROS:  General: Per HPI  Skin: Negative, except if noted below  HEENT: Negative  Respiratory: Negative  Cardiovascular: Negative  Gastrointestinal: Negative  Urinary: Negative  Vascular: Negative  Musculoskeletal: Positive per HPI   Neurologic: Positive per HPI  Endocrine: Negative    Objective:  BP Readings from Last 1 Encounters:   03/05/24 (!) 188/91      Wt Readings from Last 1 Encounters:   03/05/24 77.6 kg (171 lb)        Respiratory:   non-labored respirations    Lymphatics:  no palpable lymph nodes    Gait:   antalgic    Neurologic:   Alert and oriented times 3  Patient with normal  "sensation except as noted below  Deep tendon reflexes 2+ except as noted in MSK exam    Bilateral Lower Extremity:  Left Knee:      Inspection:  skin intact, previous surgical incisions noted    Overall limb alignment valgus    Effusion: none    ROM 0-115 wo pain    Extensor Lag: none    Palpation: no Joint line tenderness to palpation    AP Stability at 90 deg stable    M/L stability in full extension stable    M/L stability in midflexion stable    Motor: 5/5 Q/HS/TA/GS/P    Pulses: 2+ DP / 2+ PT    SILT DP/SP/S/S/TN    Right knee:     Inspection:  skin intact, previous surgical scars noted    Overall limb alignment varus    Effusion: large    ROM 5-90 with severe pain    Extensor Lag: none    Palpation: medial Joint line tenderness to palpation    AP Stability at 90 deg +Instability with anterior drawer     M/L stability in full extension stable    M/L stability in midflexion stable    Motor: 5/5 Q/HS/TA/GS/P    Pulses: 2+ DP / 2+ PT    SILT DP/SP/S/S/TN    Imaging:  My interpretation XR AP scanogram/AP bilateral knee/lateral/shanks/sunrise right knee: severe joint space narrowing, subchondral sclerosis, subchondral cysts, osteophyte formation. Bone on bone changes in the medial compartment. No fracture or dislocation.       BMI:   Estimated body mass index is 30.29 kg/m² as calculated from the following:    Height as of this encounter: 5' 3\" (1.6 m).    Weight as of this encounter: 77.6 kg (171 lb).  BSA:   Estimated body surface area is 1.81 meters squared as calculated from the following:    Height as of this encounter: 5' 3\" (1.6 m).    Weight as of this encounter: 77.6 kg (171 lb).           Scribe Attestation      I,:  Rosemarie Bianchi PA-C am acting as a scribe while in the presence of the attending physician.:       I,:  Rosas Vincent DO personally performed the services described in this documentation    as scribed in my presence.:               "

## 2024-03-05 NOTE — H&P (VIEW-ONLY)
Knee New Office Note    Assessment:     1. Encounter for lower extremity comparison imaging study    2. Primary osteoarthritis of right knee    3. Chronic pain of right knee    4. Effusion of right knee    5. Other tear of lateral meniscus of right knee as current injury, subsequent encounter    6. History of knee surgery        Plan:     Problem List Items Addressed This Visit          Musculoskeletal and Integument    Primary osteoarthritis of right knee    Effusion of right knee       Other    Chronic pain of right knee     Other Visit Diagnoses       Encounter for lower extremity comparison imaging study    -  Primary    Relevant Orders    XR bone length (scanogram)    Other tear of lateral meniscus of right knee as current injury, subsequent encounter        History of knee surgery               59 y/o female with right knee pain secondary to severe OA.  Xrays of the right knee reviewed today showing severe arthritic changes with decreased joint space, osteophyte formation, and varus alignment.  On physical exam she has significant limited range of motion with pain as well as pain over the medial joint line and a large re-accumulated effusion.  Discussed conservative treatment options to include cortisone injections, visco injections, oral NSAIDs, Tylenol, activity modifications, staying active with low impact exercises, and weight loss.  We also reviewed surgical treatment option of Right TKA, including procedure and recovery time after.  She has tried and failed treatment options including multiple injections, NSAIDs, PT, HEP, and activity modifications. Her pain is affecting her ADLs.    The patient has elected to proceed with Right TKA. Risks and benefits of surgery to include but not limited to bleeding, infection, damage to surrounding structures, hardware failure, instability, fracture, dislocation, need for further surgery, continued pain, stiffness, blood clots, stroke, and heart attack was discussed  with the patient. Informed consent was signed today in the office. The patient has met with our surgical schedulers and our preoperative joint replacement pathway has been initiated. All questions were answered. Patient will follow-up 2 weeks post operatively. BMI is appropriate at 30.29 and is cutting back on smoking and she will quit 2 weeks before and 2 weeks after surgery.      Subjective:     Patient ID: Bhavana Smith is a 58 y.o. female.  Chief Complaint:  HPI:  58 y.o. female who presents today for an evaluation of her RIGHT knee.  She has been having worsening, progressive pain in the right knee.  She has known OA of the right knee that she has been treating for conservatively with intermittent injections and NSAIDs.  She is unable to take Tylenol.  She states that she does have a prior history of multiple surgeries to correct bony abnormality of the patella, however she is unable to recall what procedure or who performed the procedure. She did well initially after this procedure, but recently her pain has returned. She gets intermittent effusions, which had had to be aspirated in the past. She was seen by Dr. Chavis who aspirated her knee and gave her a cortisone injection.  An MRI was ordered to further eval her knee and was told that she has severe OA with tearing of the menisci. She was sent for evaluation due to complex surgical history. She is here today to discuss TKA.      Allergy:  Allergies   Allergen Reactions    Losartan Cough    Latex Rash     Medications:  all current active meds have been reviewed  Past Medical History:  Past Medical History:   Diagnosis Date    Addiction to drug (HCC)     Adjustment disorder     Alcohol abuse     Alcoholism (HCC)     Anxiety     Bipolar disorder (HCC)     Bowel obstruction (HCC)     Depression     Diabetes type 2, controlled (HCC)     Disease of thyroid gland     Gastritis     Head injury     Heart palpitations     Hyperlipidemia     Hypertension      Hypothyroidism     Psychiatric illness     PTSD (post-traumatic stress disorder)     Seizure (HCC)     Seizures (HCC)     Sleep difficulties     Substance abuse (HCC)     Suicide attempt (HCC)     Withdrawal symptoms, drug or narcotic (HCC)      Past Surgical History:  Past Surgical History:   Procedure Laterality Date    ABDOMINAL SURGERY      APPENDECTOMY      BOWEL RESECTION      CHOLECYSTECTOMY      ESOPHAGOGASTRODUODENOSCOPY N/A 05/18/2018    Procedure: ESOPHAGOGASTRODUODENOSCOPY (EGD) with bx;  Surgeon: Lulu West DO;  Location: [facility];  Service: Gastroenterology    HYSTERECTOMY      KNEE SURGERY Bilateral 1991     Family History:  Family History   Problem Relation Age of Onset    Bipolar disorder Mother     Cancer Father     Diabetes Father      Social History:  Social History     Substance and Sexual Activity   Alcohol Use Not Currently    Alcohol/week: 6.0 standard drinks of alcohol    Types: 6 Cans of beer per week    Comment: last drink on 08/15/20     Social History     Substance and Sexual Activity   Drug Use Never     Social History     Tobacco Use   Smoking Status Every Day    Current packs/day: 1.00    Average packs/day: 1 pack/day for 25.0 years (25.0 ttl pk-yrs)    Types: Cigarettes    Passive exposure: Current   Smokeless Tobacco Never   Tobacco Comments    smokes like 5 a day(06/24/2022)           ROS:  General: Per HPI  Skin: Negative, except if noted below  HEENT: Negative  Respiratory: Negative  Cardiovascular: Negative  Gastrointestinal: Negative  Urinary: Negative  Vascular: Negative  Musculoskeletal: Positive per HPI   Neurologic: Positive per HPI  Endocrine: Negative    Objective:  BP Readings from Last 1 Encounters:   03/05/24 (!) 188/91      Wt Readings from Last 1 Encounters:   03/05/24 77.6 kg (171 lb)        Respiratory:   non-labored respirations    Lymphatics:  no palpable lymph nodes    Gait:   antalgic    Neurologic:   Alert and oriented times 3  Patient with normal  "sensation except as noted below  Deep tendon reflexes 2+ except as noted in MSK exam    Bilateral Lower Extremity:  Left Knee:      Inspection:  skin intact, previous surgical incisions noted    Overall limb alignment valgus    Effusion: none    ROM 0-115 wo pain    Extensor Lag: none    Palpation: no Joint line tenderness to palpation    AP Stability at 90 deg stable    M/L stability in full extension stable    M/L stability in midflexion stable    Motor: 5/5 Q/HS/TA/GS/P    Pulses: 2+ DP / 2+ PT    SILT DP/SP/S/S/TN    Right knee:     Inspection:  skin intact, previous surgical scars noted    Overall limb alignment varus    Effusion: large    ROM 5-90 with severe pain    Extensor Lag: none    Palpation: medial Joint line tenderness to palpation    AP Stability at 90 deg +Instability with anterior drawer     M/L stability in full extension stable    M/L stability in midflexion stable    Motor: 5/5 Q/HS/TA/GS/P    Pulses: 2+ DP / 2+ PT    SILT DP/SP/S/S/TN    Imaging:  My interpretation XR AP scanogram/AP bilateral knee/lateral/shanks/sunrise right knee: severe joint space narrowing, subchondral sclerosis, subchondral cysts, osteophyte formation. Bone on bone changes in the medial compartment. No fracture or dislocation.       BMI:   Estimated body mass index is 30.29 kg/m² as calculated from the following:    Height as of this encounter: 5' 3\" (1.6 m).    Weight as of this encounter: 77.6 kg (171 lb).  BSA:   Estimated body surface area is 1.81 meters squared as calculated from the following:    Height as of this encounter: 5' 3\" (1.6 m).    Weight as of this encounter: 77.6 kg (171 lb).           Scribe Attestation      I,:  Rosemarie Bianchi PA-C am acting as a scribe while in the presence of the attending physician.:       I,:  Rosas Vincent DO personally performed the services described in this documentation    as scribed in my presence.:               "

## 2024-03-05 NOTE — PROGRESS NOTES
I spoke briefly with patient who is at the orthopedic office for a visit.I will call her tomorrow.

## 2024-03-06 ENCOUNTER — PATIENT OUTREACH (OUTPATIENT)
Dept: FAMILY MEDICINE CLINIC | Facility: CLINIC | Age: 59
End: 2024-03-06

## 2024-03-06 NOTE — PROGRESS NOTES
I spoke with Bhavana and reviewed her discharge instructions with her. She will have blood work done before her appointment with Dr. Shultz at perioperative medicine.

## 2024-03-07 ENCOUNTER — TELEPHONE (OUTPATIENT)
Dept: OBGYN CLINIC | Facility: HOSPITAL | Age: 59
End: 2024-03-07

## 2024-03-07 ENCOUNTER — APPOINTMENT (OUTPATIENT)
Dept: LAB | Facility: HOSPITAL | Age: 59
End: 2024-03-07
Payer: COMMERCIAL

## 2024-03-07 DIAGNOSIS — M17.11 PRIMARY OSTEOARTHRITIS OF RIGHT KNEE: Primary | ICD-10-CM

## 2024-03-07 DIAGNOSIS — F31.4 BIPOLAR DISORDER, CURRENT EPISODE DEPRESSED, SEVERE, WITHOUT PSYCHOTIC FEATURES (HCC): ICD-10-CM

## 2024-03-07 DIAGNOSIS — M17.11 PRIMARY OSTEOARTHRITIS OF RIGHT KNEE: ICD-10-CM

## 2024-03-07 DIAGNOSIS — M25.59 PAIN IN OTHER JOINT: ICD-10-CM

## 2024-03-07 DIAGNOSIS — Z01.818 OTHER SPECIFIED PRE-OPERATIVE EXAMINATION: ICD-10-CM

## 2024-03-07 LAB
ALBUMIN SERPL BCP-MCNC: 4.2 G/DL (ref 3.5–5)
ALP SERPL-CCNC: 71 U/L (ref 34–104)
ALT SERPL W P-5'-P-CCNC: 12 U/L (ref 7–52)
ANION GAP SERPL CALCULATED.3IONS-SCNC: 7 MMOL/L
APTT PPP: 29 SECONDS (ref 23–37)
AST SERPL W P-5'-P-CCNC: 12 U/L (ref 13–39)
BASOPHILS # BLD AUTO: 0.02 THOUSANDS/ÂΜL (ref 0–0.1)
BASOPHILS NFR BLD AUTO: 0 % (ref 0–1)
BILIRUB SERPL-MCNC: 0.37 MG/DL (ref 0.2–1)
BUN SERPL-MCNC: 13 MG/DL (ref 5–25)
CALCIUM SERPL-MCNC: 9.5 MG/DL (ref 8.4–10.2)
CHLORIDE SERPL-SCNC: 100 MMOL/L (ref 96–108)
CO2 SERPL-SCNC: 31 MMOL/L (ref 21–32)
CREAT SERPL-MCNC: 0.59 MG/DL (ref 0.6–1.3)
EOSINOPHIL # BLD AUTO: 0.12 THOUSAND/ÂΜL (ref 0–0.61)
EOSINOPHIL NFR BLD AUTO: 1 % (ref 0–6)
ERYTHROCYTE [DISTWIDTH] IN BLOOD BY AUTOMATED COUNT: 13.4 % (ref 11.6–15.1)
EST. AVERAGE GLUCOSE BLD GHB EST-MCNC: 120 MG/DL
GFR SERPL CREATININE-BSD FRML MDRD: 102 ML/MIN/1.73SQ M
GLUCOSE SERPL-MCNC: 99 MG/DL (ref 65–140)
HBA1C MFR BLD: 5.8 %
HCT VFR BLD AUTO: 36.9 % (ref 34.8–46.1)
HGB BLD-MCNC: 11.8 G/DL (ref 11.5–15.4)
IMM GRANULOCYTES # BLD AUTO: 0.03 THOUSAND/UL (ref 0–0.2)
IMM GRANULOCYTES NFR BLD AUTO: 0 % (ref 0–2)
INR PPP: 0.94 (ref 0.84–1.19)
LYMPHOCYTES # BLD AUTO: 3.19 THOUSANDS/ÂΜL (ref 0.6–4.47)
LYMPHOCYTES NFR BLD AUTO: 32 % (ref 14–44)
MCH RBC QN AUTO: 28.7 PG (ref 26.8–34.3)
MCHC RBC AUTO-ENTMCNC: 32 G/DL (ref 31.4–37.4)
MCV RBC AUTO: 90 FL (ref 82–98)
MONOCYTES # BLD AUTO: 0.85 THOUSAND/ÂΜL (ref 0.17–1.22)
MONOCYTES NFR BLD AUTO: 8 % (ref 4–12)
NEUTROPHILS # BLD AUTO: 5.91 THOUSANDS/ÂΜL (ref 1.85–7.62)
NEUTS SEG NFR BLD AUTO: 59 % (ref 43–75)
NRBC BLD AUTO-RTO: 0 /100 WBCS
PLATELET # BLD AUTO: 384 THOUSANDS/UL (ref 149–390)
PMV BLD AUTO: 9.4 FL (ref 8.9–12.7)
POTASSIUM SERPL-SCNC: 3.8 MMOL/L (ref 3.5–5.3)
PROT SERPL-MCNC: 6.9 G/DL (ref 6.4–8.4)
PROTHROMBIN TIME: 12.5 SECONDS (ref 11.6–14.5)
RBC # BLD AUTO: 4.11 MILLION/UL (ref 3.81–5.12)
RETICS # AUTO: ABNORMAL 10*3/UL (ref 14097–95744)
RETICS # CALC: 2.03 % (ref 0.37–1.87)
SODIUM SERPL-SCNC: 138 MMOL/L (ref 135–147)
WBC # BLD AUTO: 10.12 THOUSAND/UL (ref 4.31–10.16)

## 2024-03-07 PROCEDURE — 85025 COMPLETE CBC W/AUTO DIFF WBC: CPT

## 2024-03-07 PROCEDURE — 80053 COMPREHEN METABOLIC PANEL: CPT

## 2024-03-07 PROCEDURE — 86900 BLOOD TYPING SEROLOGIC ABO: CPT | Performed by: NURSE PRACTITIONER

## 2024-03-07 PROCEDURE — 86850 RBC ANTIBODY SCREEN: CPT | Performed by: NURSE PRACTITIONER

## 2024-03-07 PROCEDURE — 85610 PROTHROMBIN TIME: CPT

## 2024-03-07 PROCEDURE — 83550 IRON BINDING TEST: CPT

## 2024-03-07 PROCEDURE — 83540 ASSAY OF IRON: CPT

## 2024-03-07 PROCEDURE — 86901 BLOOD TYPING SEROLOGIC RH(D): CPT | Performed by: NURSE PRACTITIONER

## 2024-03-07 PROCEDURE — 82728 ASSAY OF FERRITIN: CPT

## 2024-03-07 PROCEDURE — 85045 AUTOMATED RETICULOCYTE COUNT: CPT

## 2024-03-07 PROCEDURE — 83036 HEMOGLOBIN GLYCOSYLATED A1C: CPT

## 2024-03-07 PROCEDURE — 85730 THROMBOPLASTIN TIME PARTIAL: CPT

## 2024-03-07 PROCEDURE — 36415 COLL VENOUS BLD VENIPUNCTURE: CPT

## 2024-03-07 NOTE — TELEPHONE ENCOUNTER
Preoperative Elective Admission Assessment    Living Situation:    Who does pt live with: significant other and son  What kind of home: multi-level  How do they enter the home: front  How many levels in home: 2   # of steps to enter home: 0  # of steps to second floor: 5, landing 7 more  Are there handrails: Yes  Are there landings: Yes  Sleeping arrangement: second floor  Where is Bathroom: second floor  Where is the tub or shower: Step in bath tub  Dogs or ther pets: 3 dogs     First Floor Setup:   Is there a bathroom: No  Where would pt sleep:  second floor     DME:  NN ordered RW on 3/7  We discussed clearing pathways in the home and making sure there is accessibly to use the walker, for example, removing throw rugs.      Patient's Current Level of Function: Ambulates: Independently and ADLs: Independent    Post-op Caregiver: child  Caregiver Name and phone number for Inpatient discharge needs: Rubén  Currently receive any HHC/aides/community supports: No     Post-op Transport: child  To/from hospital: child  To/from PT 2-3x/week: child  Uses community transport now: No     Outpatient Physical Therapy Site:  Site: TBD  pre and post-op appts scheduled? No     Medication Management: self and pillbox  Preferred Pharmacy for Post-op Medications: [facility]  Blood Management Vitamin Regimen: Pt confirms taking as prescribed  Post-op anticoagulant: to be determined by surgical team postoperatively     DC Plan: Pt plans to be discharged home    Barriers to DC identified preoperatively: none identified    BMI: 30.29    Patient Education:  Pt educated on post-op pain, early mobilization (POD0), LOS goals, OP PT goals, and preoperative bathing. Patient educated that our goal is to appropriately discharge patient based off their post-op function while striving to maintain maximal independence. The goal is to discharge patient to home and for them to attend outpatient physical therapy.    Assigned to care team? Yes

## 2024-03-07 NOTE — TELEPHONE ENCOUNTER
Refill must be reviewed and completed by the office or provider. The refill is unable to be approved by the medication management team.    Cannot be delegated

## 2024-03-07 NOTE — TELEPHONE ENCOUNTER
Reason for call:   [x] Refill   [] Prior Auth  [] Other:     Office:   [x] PCP/Provider -   [] Specialty/Provider -     Medication: Clonazepam     Dose/Frequency: 0.5 mg tablet taken by mouth once daily PRN for anxiety     Quantity: 30    Pharmacy: [id number] [place], [place] - [address] [facility] [phone]     Does the patient have enough for 3 days?   [] Yes   [x] No - Send as HP to POD

## 2024-03-08 ENCOUNTER — LAB REQUISITION (OUTPATIENT)
Dept: LAB | Facility: HOSPITAL | Age: 59
End: 2024-03-08
Payer: COMMERCIAL

## 2024-03-08 DIAGNOSIS — Z01.818 ENCOUNTER FOR OTHER PREPROCEDURAL EXAMINATION: ICD-10-CM

## 2024-03-08 LAB
ABO GROUP BLD: NORMAL
BLD GP AB SCN SERPL QL: NEGATIVE
FERRITIN SERPL-MCNC: 7 NG/ML (ref 11–307)
IRON SATN MFR SERPL: 12 % (ref 15–50)
IRON SERPL-MCNC: 47 UG/DL (ref 50–212)
RH BLD: POSITIVE
SPECIMEN EXPIRATION DATE: NORMAL
TIBC SERPL-MCNC: 408 UG/DL (ref 250–450)
UIBC SERPL-MCNC: 361 UG/DL (ref 155–355)

## 2024-03-08 RX ORDER — CLONAZEPAM 0.5 MG/1
0.5 TABLET ORAL DAILY PRN
Qty: 30 TABLET | Refills: 0 | Status: SHIPPED | OUTPATIENT
Start: 2024-03-08

## 2024-03-11 ENCOUNTER — VBI (OUTPATIENT)
Dept: ADMINISTRATIVE | Facility: OTHER | Age: 59
End: 2024-03-11

## 2024-03-11 NOTE — PROGRESS NOTES
Internal Medicine Pre-Operative Evaluation:     Reason for Visit: Pre-operative Evaluation for Risk Stratification and Optimization    Patient ID: Bhavana Smith is a 58 y.o. female.     Surgery: Arthroplasty of right knee  Referring Provider: Dr Vincent      Recommendations to Proceed withSurgery    Patient is considered to be Low risk for Medium risk procedure.     After evaluation and discussion with patient with emphasis that all surgery has some degree of inherent risk it is determined this procedure is of acceptable risk  medically.    Patient may proceed with planned procedure      Assessment    Pre-operative Medical Evaluation for planned surgery  Recommendations as listed in PLAN section below  Contact surgical nurse  navigator with any questions regarding preoperative plan or schedule.      Problem List Items Addressed This Visit          Endocrine    Diabetes mellitus type 2 in obese      Hold medications as instructed by PAT  Monitor post operative BS   Continue ADA diet    Lab Results   Component Value Date    HGBA1C 5.8 (H) 03/07/2024               Cardiovascular and Mediastinum    Essential hypertension     Stable  Monitor post operative BP   Avoid hypotension if at all possible  Refer to PAT instructions regarding medication administration the morning of surgery              Musculoskeletal and Integument    Primary osteoarthritis of right knee     Failed outpatient conservative measures  Electing to undergo arthroplasty              Other    Bipolar 1 disorder, depressed (HCC)     Continue medications as prescribed         Tobacco abuse     Recommend cessation          Other Visit Diagnoses       Preoperative clearance    -  Primary                 Plan:     1. Further preoperative workup as follows:   - none no further testing required may proceed with surgery    2. Medication Management/Recommendations:   - hold diuretic / water pill  morning of surgery unless given other instructions by your  "provider  - Insulin Management: lantus as per PAT direction  - Hold the following oral diabetes medication morning of surgery: metformin  - hold aspirin 7 days prior to surgery  - avoid use of NSAID such as motrin, advil, aleve for 7 days prior to surgery  - hold all OTC herbal or nutritional supplements 7 days before surgery    3. Patient requires further consultation with:   No Consults Required    4. Discharge Planning / Barriers to Discharge  none identified - patients has post discharge therapy plan in place, transportation arranged for discharge day, adequate family support at home to assist with discharge to home.        Subjective:           History of Present Illness:     Bhavana Smith is a 58 y.o. female who presents to the office today for a preoperative consultation at the request of surgeon. The patient understands this is an elective procedure and not emergent. They are electing to undergo planned procedure with an understanding that all surgery has inherent risk. They have worked with their surgeon and failed conservative treatment measures. Today they present for preoperative risk assessment and recommendations for optimization in preparation for surgery.    Pt seen in surgical optimization center for upcoming proposed surgery. They have failed previous conservative measures and have elected surgical intervention.     Pt meets presurgical lab and BMI optimization goals.    Upon interview questioning patient is able to perform greater than 4 METs workload in daily life without any reported cardio-pulmonary symptoms.          ROS: No TIA's or unusual headaches, no dysphagia.  No prolonged cough. No dyspnea or chest pain on exertion.  No abdominal pain, change in bowel habits, black or bloody stools.  No urinary tract or BPH symptoms.  Positive reported pain in arthritic joint. Positive difficulty with gait. No skin rashes or issues.      Objective:    /81   Pulse 70   Ht 5' 3\" (1.6 m)   Wt " 76.7 kg (169 lb)   BMI 29.94 kg/m²       General Appearance: no distress, conversive  HEENT: PERRLA, conjuctiva normal; oropharynx clear; mucous membranes moist;   Neck:  Supple, no lymphadenopathy or thyromegaly  Lungs: breath sounds normal, normal respiratory effort, no retractions, expiratory effort normal  CV: normal heart sounds S1/S2, PMI normal   ABD: soft non tender, no masses , no hepatic or splenomegaly  EXT: DP pulses intact, no lymphadenopathy, no edema  Skin: normal turgor, normal texture, no rash  Psych: affect normal, mood normal  Neuro: AAOx3        The following portions of the patient's history were reviewed and updated as appropriate: allergies, current medications, past family history, past medical history, past social history, past surgical history and problem list.     Past History:       Past Medical History:   Diagnosis Date   • Addiction to drug (HCC)    • Adjustment disorder    • Alcohol abuse    • Alcoholism (HCC)    • Anxiety    • Bipolar disorder (HCC)    • Bowel obstruction (HCC)    • Depression    • Diabetes type 2, controlled (HCC)    • Disease of thyroid gland    • Gastritis    • Head injury    • Heart palpitations    • Hyperlipidemia    • Hypertension    • Hypothyroidism    • Psychiatric illness    • PTSD (post-traumatic stress disorder)    • Seizure (HCC)    • Seizures (HCC)    • Sleep difficulties    • Substance abuse (HCC)    • Suicide attempt (HCC)    • Withdrawal symptoms, drug or narcotic (HCC)     Past Surgical History:   Procedure Laterality Date   • ABDOMINAL SURGERY     • APPENDECTOMY     • BOWEL RESECTION     • CHOLECYSTECTOMY     • ESOPHAGOGASTRODUODENOSCOPY N/A 05/18/2018    Procedure: ESOPHAGOGASTRODUODENOSCOPY (EGD) with bx;  Surgeon: Lulu West DO;  Location: [facility];  Service: Gastroenterology   • HYSTERECTOMY     • KNEE SURGERY Bilateral 1991          Social History     Tobacco Use   • Smoking status: Every Day     Current packs/day: 1.00     Average  packs/day: 1 pack/day for 25.0 years (25.0 ttl pk-yrs)     Types: Cigarettes     Passive exposure: Current   • Smokeless tobacco: Never   • Tobacco comments:     smokes like 5 a day(06/24/2022), smokes 3 cigarettes per day (3/19/24)   Vaping Use   • Vaping status: Never Used   Substance Use Topics   • Alcohol use: Not Currently     Alcohol/week: 6.0 standard drinks of alcohol     Types: 6 Cans of beer per week     Comment: last drink on 08/15/20   • Drug use: Not Currently     Family History   Problem Relation Age of Onset   • Bipolar disorder Mother    • Cancer Father    • Diabetes Father           Allergies:     Allergies   Allergen Reactions   • Latex Itching and Rash   • Losartan Cough        Current Medications:     Current Outpatient Medications   Medication Instructions   • Alcohol Swabs 70 % PADS May substitute brand based on insurance coverage. Check glucose TID.   • aluminum-magnesium hydroxide-simethicone (MAALOX) 6443-4322-781 mg/30 mL suspension 30 mL, Oral, Every 4 hours PRN   • ARIPiprazole (ABILIFY) 10 mg, Oral, Daily   • ascorbic acid (VITAMIN C) 500 mg, Oral, 2 times daily   • aspirin 81 mg, Oral, Daily   • atorvastatin (LIPITOR) 20 mg, Oral, Daily   • Blood Glucose Monitoring Suppl (OneTouch Verio Reflect) w/Device KIT May substitute brand based on insurance coverage. Check glucose TID.   • cholecalciferol 2,000 Units, Oral, Daily   • clonazePAM (KLONOPIN) 0.5 mg, Oral, Daily PRN   • Diclofenac Sodium (VOLTAREN) 2 g, Topical, 4 times daily   • dicyclomine (BENTYL) 20 mg, Oral, 3 times daily PRN   • docusate sodium (COLACE) 100 mg, Oral, 2 times daily   • docusate sodium (COLACE) 100 mg, Oral, 2 times daily   • ergocalciferol (VITAMIN D2) 50,000 Units, Oral, Weekly   • escitalopram (LEXAPRO) 10 mg tablet TAKE 1 TABLET(10 MG) BY MOUTH DAILY   • ferrous sulfate 324 mg, Oral, 2 times daily before meals   • folic acid (FOLVITE) 1 mg, Oral, Daily   • gabapentin (NEURONTIN) 800 mg tablet TAKE 1 TABLET BY  "MOUTH 3 TIMES  DAILY   • hydroCHLOROthiazide 12.5 mg, Oral, Daily   • insulin glargine (LANTUS) 25 Units, Subcutaneous, Daily at bedtime   • levothyroxine 25 mcg, Oral, Daily (early morning)   • linaCLOtide 145 mcg, Oral, Daily   • metFORMIN (GLUCOPHAGE) 1,000 mg, Oral, 2 times daily with meals   • methylPREDNISolone 4 MG tablet therapy pack Use as directed on package   • metoprolol tartrate (LOPRESSOR) 50 mg, Oral, Every 12 hours   • Microlet Lancets MISC No dose, route, or frequency recorded.   • Multiple Vitamins-Minerals (multivitamin with minerals) tablet 1 tablet, Oral, Daily   • ondansetron (ZOFRAN) 4 mg, Oral, Every 8 hours PRN   • OneTouch Delica Lancets 33G MISC May substitute brand based on insurance coverage. Check glucose TID.   • pantoprazole (PROTONIX) 40 mg, Oral, 2 times daily   • polyethylene glycol (MIRALAX) 17 g, Oral, Daily   • senna (SENOKOT) 8.6 mg, Oral, Daily   • sucralfate (CARAFATE) 1 g, Oral, 4 times daily   • SUMAtriptan (IMITREX) 25 mg, Oral, Daily PRN, Do not take more than 3 doses in 1 week   • traZODone (DESYREL) 200 mg, Oral, Daily at bedtime           PRE-OP WORKSHEET DATA    Assessment of Pre-Operative Risks     MLJ Quality Hard Stops:  BMI (<40) : Estimated body mass index is 29.94 kg/m² as calculated from the following:    Height as of this encounter: 5' 3\" (1.6 m).    Weight as of this encounter: 76.7 kg (169 lb).    Hgb ( >11):   Lab Results   Component Value Date    HGB 11.8 03/07/2024     HbA1c (<7.5) :   Lab Results   Component Value Date    HGBA1C 5.8 (H) 03/07/2024     GFR (>60) (Less then 45 = Nephrology consult):  CrCl cannot be calculated (Patient's most recent lab result is older than the maximum 7 days allowed.).      Active Decompensated Chronic Conditions which would delay surgery  No acutely decompensated medical issues such as recent CVA, MI, new onset arrhythmia, severe aortic stenosis, CHF, uncontrolled COPD       Exercise Capacity: (if less the 4 mets consider " functional assessment via cardiac stress testing or consultation)    Able to walk 2 blocks without symptoms?: Yes  Able to walk 1 flights without symptoms?: Yes    Assessment of intra and post operative respiratory, hemodynamic and thrombotic risks     Prior Anesthesia Reactions: No     Personal history of venous thromboembolic disease? No    History of steroid use > 5 mg for >2 weeks within last year? No      The patient's risk factors for cardiac complications include :  none    Bhavana Smith has an IN HOSPITAL cardiac risk of RCI RISK CLASS I (0 risk factors, risk of major cardiac compl. appr. 0.5%) based on RCRI calculator    Cardiac Risk Estimation: per the Revised Cardiac Risk Index (Circ. 100:1043, 1999),        Pre-Op Data Reviewed:       Laboratory Results: I have personally reviewed the pertinent laboratory results/reports     EKG:I have personally reviewed pertinent reports.  . I personally reviewed and interpreted available tracings in the electronic medical record    Sinus rhythm with 1st degree A-V block  Septal infarct (cited on or before 02-NOV-2022)  Abnormal ECG  When compared with ECG of 03-SEP-2023 00:20,  No significant change was found  Confirmed by Garry Doe ([id number]) on 10/15/2023    OLD RECORDS: reviewed old records in the chart review section if EHR on day of visit.    Previous cardiopulmonary studies within the past year:  Echocardiogram: yes, 2023 ef 65%  Cardiac Catheterization: no  Stress Test: no      Time of visit including pre-visit chart review, visit and post-visit coordination of plan and care , review of pre-surgical lab work, preparation and time spent documenting note in electronic medical record, time spent face-to-face in physical examination answering patient questions by care team 35 minutes             [facility]

## 2024-03-11 NOTE — PATIENT INSTRUCTIONS
Contact surgical nurse  navigator with any questions regarding preoperative plan or schedule.  Stop all over the counter supplements, herbal, naturopathic  medications for 1 week prior to surgery UNLESS prescribed by your surgeon  Hold NSAIDS (i.e. advil, alleve, motrin, ibuprofen, celebrex) minimum 7 days prior to surgery  Hold Asprin minimum 7 days prior to surgery unless instructed otherwise by your physician  Recommend using Tylenol ( acetaminophen ) 500mg every eight hours during the first week post discharge in conjunction with any additional pain medicine prescribed by your surgeon  Use bowel medicines prescribed by your surgeon ( colace) daily post op during the first 1-2 weeks to avoid post operative constipation issues  Practice deep breathing and blood clot prevention exercises starting 2 weeks before surgery and continue for minimum of 2 weeks after surgery. Examples can be found in the following video link: https://[email or web address].com/watch?v=[email or web address]  Follow presurgical medication instructions provided by preadmission nursing team reviewed during your presurgery phone call  Call [phone] with any post discharge concerns or medical issues   Take metoprolol and levothyroxine the morning of surgery

## 2024-03-11 NOTE — ASSESSMENT & PLAN NOTE
Stable  Monitor post operative BP   Avoid hypotension if at all possible  Refer to PAT instructions regarding medication administration the morning of surgery

## 2024-03-11 NOTE — TELEPHONE ENCOUNTER
03/11/24 9:36 AM     [employer] SmartForm used to document caregap status.    Chantale Holland MA

## 2024-03-11 NOTE — ASSESSMENT & PLAN NOTE
Hold medications as instructed by PAT  Monitor post operative BS   Continue ADA diet    Lab Results   Component Value Date    HGBA1C 5.8 (H) 03/07/2024

## 2024-03-12 NOTE — ASSESSMENT & PLAN NOTE
Assumed patient care. Patient sitting on phone in chair. No distress noted. Breathing even and unlabored.    Presented with progressing SOB, wheezing suspected from viral bronchitis  · No Hx of COPD, asthma  · Good baseline functional status given Pt can not ambulate up hill w/o any difficulty  · COVID/RSV/influenza: Negative  · CXR w/o any jeovanny consolidation  · No URTI symptoms  · Patient continues to have wheezing, SOB, generalized weakness today but has improved since yesterday  · C/w Solu-Medrol IV to b i d  (D2)  · Respiratory protocol  · Continue albuterol, Xopenex nebs  · Pulmonary consulted  · Appreciate recommendations  · Incentive spirometry  · Tessalon Perles p r n    · Will probably need PFTs on discharge  · Smoking cessation was encouraged

## 2024-03-14 ENCOUNTER — ANESTHESIA EVENT (OUTPATIENT)
Dept: PERIOP | Facility: HOSPITAL | Age: 59
DRG: 470 | End: 2024-03-14
Payer: COMMERCIAL

## 2024-03-14 DIAGNOSIS — I10 PRIMARY HYPERTENSION: ICD-10-CM

## 2024-03-14 DIAGNOSIS — F31.9 BIPOLAR 1 DISORDER, DEPRESSED (HCC): ICD-10-CM

## 2024-03-15 DIAGNOSIS — D50.9 IRON DEFICIENCY ANEMIA, UNSPECIFIED IRON DEFICIENCY ANEMIA TYPE: Primary | ICD-10-CM

## 2024-03-15 RX ORDER — FERROUS SULFATE 324(65)MG
324 TABLET, DELAYED RELEASE (ENTERIC COATED) ORAL
Qty: 30 TABLET | Refills: 1 | Status: SHIPPED | OUTPATIENT
Start: 2024-03-15

## 2024-03-15 RX ORDER — METOPROLOL TARTRATE 50 MG/1
50 TABLET, FILM COATED ORAL EVERY 12 HOURS
Qty: 200 TABLET | Refills: 1 | Status: SHIPPED | OUTPATIENT
Start: 2024-03-15

## 2024-03-15 RX ORDER — GABAPENTIN 800 MG/1
TABLET ORAL
Qty: 300 TABLET | Refills: 1 | Status: SHIPPED | OUTPATIENT
Start: 2024-03-15

## 2024-03-17 DIAGNOSIS — F41.9 ANXIETY: ICD-10-CM

## 2024-03-18 RX ORDER — ESCITALOPRAM OXALATE 10 MG/1
TABLET ORAL
Qty: 90 TABLET | Refills: 1 | Status: SHIPPED | OUTPATIENT
Start: 2024-03-18

## 2024-03-19 NOTE — PRE-PROCEDURE INSTRUCTIONS
Pre-Surgery Instructions:   Medication Instructions    aluminum-magnesium hydroxide-simethicone (MAALOX) 8816-2419-108 mg/30 mL suspension Hold day of surgery.    ARIPiprazole (ABILIFY) 10 mg tablet Take day of surgery.    ascorbic acid (VITAMIN C) 500 MG tablet Hold day of surgery.    atorvastatin (LIPITOR) 20 mg tablet Take day of surgery.    cholecalciferol (VITAMIN D3) 1,000 units tablet Hold day of surgery.    clonazePAM (KlonoPIN) 0.5 mg tablet Take day of surgery.    Diclofenac Sodium (VOLTAREN) 1 % Hold day of surgery.    ergocalciferol (VITAMIN D2) 50,000 units Hold day of surgery.    ferrous sulfate 324 (65 Fe) mg Hold day of surgery.    folic acid (FOLVITE) 1 mg tablet Hold day of surgery.    gabapentin (NEURONTIN) 800 mg tablet Take day of surgery.    hydrochlorothiazide (HYDRODIURIL) 12.5 mg tablet Hold day of surgery.    levothyroxine 25 mcg tablet Take day of surgery.    metFORMIN (GLUCOPHAGE) 1000 MG tablet Hold day of surgery.    metoprolol tartrate (LOPRESSOR) 50 mg tablet Take day of surgery.    Multiple Vitamins-Minerals (multivitamin with minerals) tablet Hold day of surgery.    SUMAtriptan (Imitrex) 25 mg tablet Stop taking 1 day prior to surgery.    traZODone (DESYREL) 100 mg tablet Take night before surgery   Medication instructions for day surgery reviewed. Please use only a sip of water to take your instructed medications. Avoid all over the counter vitamins, supplements and NSAIDS for one week prior to surgery per anesthesia guidelines. Tylenol is ok to take as needed.     You will receive a call one business day prior to surgery with an arrival time and hospital directions. If your surgery is scheduled on a Monday, the hospital will be calling you on the Friday prior to your surgery. If you have not heard from anyone by 8pm, please call the hospital supervisor through the hospital  at [phone]. ([place] [phone] or [place] [phone]).    Do not eat or drink anything  after midnight the night before your surgery, including candy, mints, lifesavers, or chewing gum. Do not drink alcohol 24hrs before your surgery. Try not to smoke at least 24hrs before your surgery.       Follow the pre surgery showering instructions as listed in the “My Surgical Experience Booklet” or otherwise provided by your surgeon's office. Do not use a blade to shave the surgical area 1 week before surgery. It is okay to use a clean electric clippers up to 24 hours before surgery. Do not apply any lotions, creams, including makeup, cologne, deodorant, or perfumes after showering on the day of your surgery. Do not use dry shampoo, hair spray, hair gel, or any type of hair products.     No contact lenses, eye make-up, or artificial eyelashes. Remove nail polish, including gel polish, and any artificial, gel, or acrylic nails if possible. Remove all jewelry including rings and body piercing jewelry.     Wear causal clothing that is easy to take on and off. Consider your type of surgery.    Keep any valuables, jewelry, piercings at home. Please bring any specially ordered equipment (sling, braces) if indicated.    Arrange for a responsible person to drive you to and from the hospital on the day of your surgery. Please confirm the visitor policy for the day of your procedure when you receive your phone call with an arrival time.     Call the surgeon's office with any new illnesses, exposures, or additional questions prior to surgery.    Please reference your “My Surgical Experience Booklet” for additional information to prepare for your upcoming surgery.

## 2024-03-20 ENCOUNTER — OFFICE VISIT (OUTPATIENT)
Age: 59
End: 2024-03-20
Payer: COMMERCIAL

## 2024-03-20 VITALS
SYSTOLIC BLOOD PRESSURE: 138 MMHG | HEIGHT: 63 IN | BODY MASS INDEX: 29.95 KG/M2 | DIASTOLIC BLOOD PRESSURE: 81 MMHG | HEART RATE: 70 BPM | WEIGHT: 169 LBS

## 2024-03-20 DIAGNOSIS — E66.9 DIABETES MELLITUS TYPE 2 IN OBESE: ICD-10-CM

## 2024-03-20 DIAGNOSIS — M17.11 PRIMARY OSTEOARTHRITIS OF RIGHT KNEE: ICD-10-CM

## 2024-03-20 DIAGNOSIS — E11.69 DIABETES MELLITUS TYPE 2 IN OBESE: ICD-10-CM

## 2024-03-20 DIAGNOSIS — F31.9 BIPOLAR 1 DISORDER, DEPRESSED (HCC): ICD-10-CM

## 2024-03-20 DIAGNOSIS — Z72.0 TOBACCO ABUSE: ICD-10-CM

## 2024-03-20 DIAGNOSIS — Z01.818 PREOPERATIVE CLEARANCE: Primary | ICD-10-CM

## 2024-03-20 DIAGNOSIS — I10 ESSENTIAL HYPERTENSION: ICD-10-CM

## 2024-03-20 PROCEDURE — 99215 OFFICE O/P EST HI 40 MIN: CPT | Performed by: INTERNAL MEDICINE

## 2024-03-20 PROCEDURE — G2211 COMPLEX E/M VISIT ADD ON: HCPCS | Performed by: INTERNAL MEDICINE

## 2024-03-21 ENCOUNTER — PATIENT OUTREACH (OUTPATIENT)
Dept: FAMILY MEDICINE CLINIC | Facility: CLINIC | Age: 59
End: 2024-03-21

## 2024-03-21 NOTE — PROGRESS NOTES
"I spoke with Sara who reports she is doing \"ok\". Her surgery is 4/1 and she had pre-op clearance done on 3/20. Her A1c is normal.She has received instructions from RN at preadmission testing and has no questions.  I will call her post-op.  "

## 2024-03-22 LAB
DME PARACHUTE DELIVERY DATE ACTUAL: NORMAL
DME PARACHUTE DELIVERY DATE REQUESTED: NORMAL
DME PARACHUTE ITEM DESCRIPTION: NORMAL
DME PARACHUTE ORDER STATUS: NORMAL
DME PARACHUTE SUPPLIER NAME: NORMAL
DME PARACHUTE SUPPLIER PHONE: NORMAL

## 2024-03-25 LAB
ATRIAL RATE: 68 BPM
DME PARACHUTE DELIVERY DATE ACTUAL: NORMAL
DME PARACHUTE DELIVERY DATE REQUESTED: NORMAL
DME PARACHUTE ITEM DESCRIPTION: NORMAL
DME PARACHUTE ORDER STATUS: NORMAL
DME PARACHUTE SUPPLIER NAME: NORMAL
DME PARACHUTE SUPPLIER PHONE: NORMAL
P AXIS: 53 DEGREES
PR INTERVAL: 208 MS
QRS AXIS: -2 DEGREES
QRSD INTERVAL: 68 MS
QT INTERVAL: 430 MS
QTC INTERVAL: 457 MS
T WAVE AXIS: 16 DEGREES
VENTRICULAR RATE: 68 BPM

## 2024-03-26 DIAGNOSIS — G43.009 MIGRAINE WITHOUT AURA AND WITHOUT STATUS MIGRAINOSUS, NOT INTRACTABLE: ICD-10-CM

## 2024-03-26 RX ORDER — SUMATRIPTAN 25 MG/1
TABLET, FILM COATED ORAL
Qty: 10 TABLET | Refills: 1 | Status: SHIPPED | OUTPATIENT
Start: 2024-03-26

## 2024-03-27 DIAGNOSIS — E55.9 VITAMIN D DEFICIENCY: ICD-10-CM

## 2024-03-28 RX ORDER — ERGOCALCIFEROL 1.25 MG/1
50000 CAPSULE ORAL WEEKLY
Qty: 15 CAPSULE | Refills: 2 | Status: SHIPPED | OUTPATIENT
Start: 2024-03-28

## 2024-03-29 ENCOUNTER — TELEPHONE (OUTPATIENT)
Age: 59
End: 2024-03-29

## 2024-03-29 NOTE — TELEPHONE ENCOUNTER
Caller: Patients Spouse Lavon (not on communication consent form)     Doctor:     Reason for call: Patient is scheduled for surgery on 4/1/24 and Lavon is asking if a wheelchair can be ordered for Bhavana.    Call back#: [phone]    ARTHROPLASTY KNEE TOTAL- possible same day (Right: Knee)

## 2024-03-31 NOTE — DISCHARGE INSTR - AVS FIRST PAGE
Dr. Vincent Knee Replacement    What to Expect/Activity  It is normal to have some discomfort in your knee for several days to weeks.  You are weight bearing as tolerated to your operative leg with assist devices.  Please use crutches/walker when ambulating until your follow-up  Swelling and discomfort in the knee is normal for several days after surgery. For the first 2-3 days, use ice around the knee to help. Use for 20-30 minutes every 1-2 hours for 48 hours, while awake. You may continue beyond 48 hours as needed.  Place one or two pillows underneath your calf, not your knee, to reduce swelling.  Physical therapy on your own at home should start as soon as possible (see below). Please perform heel slides and extension exercises on your own as well (see diagram).  Please use incentive spirometer 10 times per hour while awake (see diagram).    Dressing/Wound Care/Bathing  You may remove your toe-to-groin dressing 24 hours after surgery. There will be a surgical dressing over your incision that stays in place until follow-up unless water gets under the bandage and then it should be removed.   You may start showering 24 hours after surgery, the surgical dressing will remain in place. Please pat the dressing dry. If you notice the dressing appears saturated or is starting to come off, please replace with dry dressing.  You can keep the dressing in place until follow-up in the office.   Do not place any creams, ointments or gels on or around the incision.  No baths, swimming or submerging until cleared by Dr. Vincent    Pain Management/Medications  You may resume your usual medications.  Please take the following medications:  Anti-coagulation (blood clot prevention) - aspirin 81mg twice daily for 4 weeks  Pain medication:  Narcotic: Take as directed  NSAID/Anti-inflammatory: Take as directed  Tylenol 1000mg every 8 hours  Zofran (ondasetron) - 4mg every 8 hours as needed for nausea  Stool softeners (senna/colace) -  take daily to prevent constipation as narcotic pain medication causes constipation  Antibiotic - take as directed if prescribed   If you have questions or pain concerns, please contact the office. Pain medication cannot remove all post-operative pain.    Follow up/Call if:  The findings of your surgery will be explained to you and your family immediately after surgery. However, in the post-operative period, during recovery from anesthesia you may not fully remember or fully understand what was said. This will be again gone over when you return for your post-op appointment.  Please contact Dr. Vincent's office if you experience the following:  Excessive bleeding (bleeding through your dressing)  Fever greater than 101 degrees F after 48 hours (low grade fevers the day or two after surgery are normal)  Persistent nausea or vomiting  Decreased sensation or discoloration of the operative limb  Pain or swelling that is getting worse and not better with medication    Dr. Vincent's Office Contact: [phone]

## 2024-04-01 ENCOUNTER — ANESTHESIA (OUTPATIENT)
Dept: PERIOP | Facility: HOSPITAL | Age: 59
DRG: 470 | End: 2024-04-01
Payer: COMMERCIAL

## 2024-04-01 ENCOUNTER — APPOINTMENT (OUTPATIENT)
Dept: RADIOLOGY | Facility: HOSPITAL | Age: 59
DRG: 470 | End: 2024-04-01
Payer: COMMERCIAL

## 2024-04-01 ENCOUNTER — HOSPITAL ENCOUNTER (INPATIENT)
Facility: HOSPITAL | Age: 59
LOS: 1 days | Discharge: HOME | DRG: 470 | End: 2024-04-03
Attending: STUDENT IN AN ORGANIZED HEALTH CARE EDUCATION/TRAINING PROGRAM | Admitting: STUDENT IN AN ORGANIZED HEALTH CARE EDUCATION/TRAINING PROGRAM
Payer: COMMERCIAL

## 2024-04-01 DIAGNOSIS — M17.11 PRIMARY OSTEOARTHRITIS OF RIGHT KNEE: Primary | ICD-10-CM

## 2024-04-01 LAB
AMPHETAMINES SERPL QL SCN: NEGATIVE
BARBITURATES UR QL: NEGATIVE
BENZODIAZ UR QL: NEGATIVE
COCAINE UR QL: NEGATIVE
ETHANOL SERPL-MCNC: <10 MG/DL
GLUCOSE SERPL-MCNC: 111 MG/DL (ref 65–140)
METHADONE UR QL: NEGATIVE
OPIATES UR QL SCN: NEGATIVE
OXYCODONE+OXYMORPHONE UR QL SCN: NEGATIVE
PCP UR QL: NEGATIVE
THC UR QL: NEGATIVE

## 2024-04-01 PROCEDURE — C1713 ANCHOR/SCREW BN/BN,TIS/BN: HCPCS | Performed by: STUDENT IN AN ORGANIZED HEALTH CARE EDUCATION/TRAINING PROGRAM

## 2024-04-01 PROCEDURE — C1776 JOINT DEVICE (IMPLANTABLE): HCPCS | Performed by: STUDENT IN AN ORGANIZED HEALTH CARE EDUCATION/TRAINING PROGRAM

## 2024-04-01 PROCEDURE — 0SRC06A REPLACEMENT OF RIGHT KNEE JOINT WITH OXIDIZED ZIRCONIUM ON POLYETHYLENE SYNTHETIC SUBSTITUTE, UNCEMENTED, OPEN APPROACH: ICD-10-PCS | Performed by: STUDENT IN AN ORGANIZED HEALTH CARE EDUCATION/TRAINING PROGRAM

## 2024-04-01 PROCEDURE — 27447 TOTAL KNEE ARTHROPLASTY: CPT | Performed by: STUDENT IN AN ORGANIZED HEALTH CARE EDUCATION/TRAINING PROGRAM

## 2024-04-01 PROCEDURE — 27447 TOTAL KNEE ARTHROPLASTY: CPT | Performed by: PHYSICIAN ASSISTANT

## 2024-04-01 PROCEDURE — C9290 INJ, BUPIVACAINE LIPOSOME: HCPCS | Performed by: ANESTHESIOLOGY

## 2024-04-01 PROCEDURE — 80307 DRUG TEST PRSMV CHEM ANLYZR: CPT | Performed by: ANESTHESIOLOGY

## 2024-04-01 PROCEDURE — 73560 X-RAY EXAM OF KNEE 1 OR 2: CPT

## 2024-04-01 PROCEDURE — 82948 REAGENT STRIP/BLOOD GLUCOSE: CPT

## 2024-04-01 PROCEDURE — 82077 ASSAY SPEC XCP UR&BREATH IA: CPT | Performed by: ANESTHESIOLOGY

## 2024-04-01 DEVICE — ATTUNE KNEE SYSTEM TIBIAL BASE AFFIXIUM FIXED BEARING SIZE 4
Type: IMPLANTABLE DEVICE | Site: KNEE | Status: FUNCTIONAL
Brand: ATTUNE AFFIXIUM

## 2024-04-01 DEVICE — ATTUNE KNEE SYSTEM TIBIAL INSERT FIXED BEARING MEDIAL STABILIZED RIGHT AOX 6, 5MM
Type: IMPLANTABLE DEVICE | Site: KNEE | Status: FUNCTIONAL
Brand: ATTUNE

## 2024-04-01 DEVICE — DEPUY CMW 2 FAST SET BONE CEMENT 20G: Type: IMPLANTABLE DEVICE | Site: KNEE | Status: FUNCTIONAL

## 2024-04-01 DEVICE — ATTUNE PATELLA MEDIALIZED DOME 32MM CEMENTED AOX
Type: IMPLANTABLE DEVICE | Site: KNEE | Status: FUNCTIONAL
Brand: ATTUNE

## 2024-04-01 DEVICE — ATTUNE KNEE SYSTEM FEMORAL POROCOAT CRUCIATE RETAINING NARROW SIZE 6N RIGHT CEMENTLESS
Type: IMPLANTABLE DEVICE | Site: KNEE | Status: FUNCTIONAL
Brand: ATTUNE

## 2024-04-01 RX ORDER — CEFAZOLIN SODIUM 2 G/50ML
2000 SOLUTION INTRAVENOUS ONCE
Status: COMPLETED | OUTPATIENT
Start: 2024-04-01 | End: 2024-04-01

## 2024-04-01 RX ORDER — ALBUTEROL SULFATE 2.5 MG/3ML
2.5 SOLUTION RESPIRATORY (INHALATION) ONCE AS NEEDED
Status: DISCONTINUED | OUTPATIENT
Start: 2024-04-01 | End: 2024-04-01 | Stop reason: HOSPADM

## 2024-04-01 RX ORDER — ONDANSETRON 2 MG/ML
4 INJECTION INTRAMUSCULAR; INTRAVENOUS EVERY 6 HOURS PRN
Status: DISCONTINUED | OUTPATIENT
Start: 2024-04-01 | End: 2024-04-03 | Stop reason: HOSPADM

## 2024-04-01 RX ORDER — MAGNESIUM HYDROXIDE 1200 MG/15ML
LIQUID ORAL AS NEEDED
Status: DISCONTINUED | OUTPATIENT
Start: 2024-04-01 | End: 2024-04-01 | Stop reason: HOSPADM

## 2024-04-01 RX ORDER — DOCUSATE SODIUM 100 MG/1
100 CAPSULE, LIQUID FILLED ORAL 2 TIMES DAILY
Status: DISCONTINUED | OUTPATIENT
Start: 2024-04-01 | End: 2024-04-03 | Stop reason: HOSPADM

## 2024-04-01 RX ORDER — MIDAZOLAM HYDROCHLORIDE 2 MG/2ML
INJECTION, SOLUTION INTRAMUSCULAR; INTRAVENOUS AS NEEDED
Status: DISCONTINUED | OUTPATIENT
Start: 2024-04-01 | End: 2024-04-01

## 2024-04-01 RX ORDER — ACETAMINOPHEN 10 MG/ML
1000 INJECTION, SOLUTION INTRAVENOUS ONCE
Status: DISCONTINUED | OUTPATIENT
Start: 2024-04-01 | End: 2024-04-01 | Stop reason: HOSPADM

## 2024-04-01 RX ORDER — ARIPIPRAZOLE 10 MG/1
10 TABLET ORAL DAILY
Status: DISCONTINUED | OUTPATIENT
Start: 2024-04-01 | End: 2024-04-03 | Stop reason: HOSPADM

## 2024-04-01 RX ORDER — TRANEXAMIC ACID 10 MG/ML
1000 INJECTION, SOLUTION INTRAVENOUS ONCE
Status: COMPLETED | OUTPATIENT
Start: 2024-04-01 | End: 2024-04-01

## 2024-04-01 RX ORDER — KETOROLAC TROMETHAMINE 30 MG/ML
INJECTION, SOLUTION INTRAMUSCULAR; INTRAVENOUS AS NEEDED
Status: DISCONTINUED | OUTPATIENT
Start: 2024-04-01 | End: 2024-04-01 | Stop reason: HOSPADM

## 2024-04-01 RX ORDER — AMOXICILLIN 250 MG
1 CAPSULE ORAL DAILY
Qty: 30 TABLET | Refills: 0 | Status: SHIPPED | OUTPATIENT
Start: 2024-04-01

## 2024-04-01 RX ORDER — ACETAMINOPHEN 325 MG/1
975 TABLET ORAL ONCE
Status: COMPLETED | OUTPATIENT
Start: 2024-04-01 | End: 2024-04-01

## 2024-04-01 RX ORDER — HYDROMORPHONE HCL/PF 1 MG/ML
0.5 SYRINGE (ML) INJECTION
Status: DISCONTINUED | OUTPATIENT
Start: 2024-04-01 | End: 2024-04-01 | Stop reason: HOSPADM

## 2024-04-01 RX ORDER — OXYCODONE HYDROCHLORIDE 10 MG/1
10 TABLET ORAL EVERY 4 HOURS PRN
Status: DISCONTINUED | OUTPATIENT
Start: 2024-04-01 | End: 2024-04-03 | Stop reason: HOSPADM

## 2024-04-01 RX ORDER — ASCORBIC ACID 500 MG
500 TABLET ORAL 2 TIMES DAILY
Status: DISCONTINUED | OUTPATIENT
Start: 2024-04-01 | End: 2024-04-03 | Stop reason: HOSPADM

## 2024-04-01 RX ORDER — FENTANYL CITRATE/PF 50 MCG/ML
25 SYRINGE (ML) INJECTION
Status: DISCONTINUED | OUTPATIENT
Start: 2024-04-01 | End: 2024-04-01 | Stop reason: HOSPADM

## 2024-04-01 RX ORDER — OXYCODONE HYDROCHLORIDE 5 MG/1
5 TABLET ORAL EVERY 4 HOURS PRN
Status: DISCONTINUED | OUTPATIENT
Start: 2024-04-01 | End: 2024-04-03 | Stop reason: HOSPADM

## 2024-04-01 RX ORDER — SODIUM CHLORIDE, SODIUM LACTATE, POTASSIUM CHLORIDE, CALCIUM CHLORIDE 600; 310; 30; 20 MG/100ML; MG/100ML; MG/100ML; MG/100ML
125 INJECTION, SOLUTION INTRAVENOUS CONTINUOUS
Status: DISCONTINUED | OUTPATIENT
Start: 2024-04-01 | End: 2024-04-01 | Stop reason: SDUPTHER

## 2024-04-01 RX ORDER — CHLORHEXIDINE GLUCONATE ORAL RINSE 1.2 MG/ML
15 SOLUTION DENTAL ONCE
Status: COMPLETED | OUTPATIENT
Start: 2024-04-01 | End: 2024-04-01

## 2024-04-01 RX ORDER — ATORVASTATIN CALCIUM 20 MG/1
20 TABLET, FILM COATED ORAL
Status: DISCONTINUED | OUTPATIENT
Start: 2024-04-01 | End: 2024-04-03 | Stop reason: HOSPADM

## 2024-04-01 RX ORDER — ACETAMINOPHEN 500 MG
1000 TABLET ORAL EVERY 8 HOURS
Qty: 60 TABLET | Refills: 0 | Status: SHIPPED | OUTPATIENT
Start: 2024-04-01

## 2024-04-01 RX ORDER — CLONAZEPAM 0.5 MG/1
0.5 TABLET ORAL DAILY PRN
Status: DISCONTINUED | OUTPATIENT
Start: 2024-04-01 | End: 2024-04-03 | Stop reason: HOSPADM

## 2024-04-01 RX ORDER — ACETAMINOPHEN 325 MG/1
975 TABLET ORAL EVERY 8 HOURS
Status: DISCONTINUED | OUTPATIENT
Start: 2024-04-01 | End: 2024-04-03 | Stop reason: HOSPADM

## 2024-04-01 RX ORDER — BUPIVACAINE HYDROCHLORIDE 2.5 MG/ML
INJECTION, SOLUTION EPIDURAL; INFILTRATION; INTRACAUDAL AS NEEDED
Status: DISCONTINUED | OUTPATIENT
Start: 2024-04-01 | End: 2024-04-01 | Stop reason: HOSPADM

## 2024-04-01 RX ORDER — METOPROLOL TARTRATE 50 MG/1
50 TABLET, FILM COATED ORAL EVERY 12 HOURS
Status: DISCONTINUED | OUTPATIENT
Start: 2024-04-01 | End: 2024-04-03 | Stop reason: HOSPADM

## 2024-04-01 RX ORDER — SODIUM CHLORIDE, SODIUM LACTATE, POTASSIUM CHLORIDE, CALCIUM CHLORIDE 600; 310; 30; 20 MG/100ML; MG/100ML; MG/100ML; MG/100ML
100 INJECTION, SOLUTION INTRAVENOUS CONTINUOUS
Status: DISCONTINUED | OUTPATIENT
Start: 2024-04-01 | End: 2024-04-03 | Stop reason: HOSPADM

## 2024-04-01 RX ORDER — HYDROCHLOROTHIAZIDE 12.5 MG/1
12.5 TABLET ORAL DAILY
Status: DISCONTINUED | OUTPATIENT
Start: 2024-04-01 | End: 2024-04-03 | Stop reason: HOSPADM

## 2024-04-01 RX ORDER — CEFADROXIL 500 MG/1
500 CAPSULE ORAL EVERY 12 HOURS SCHEDULED
Qty: 10 CAPSULE | Refills: 0 | Status: SHIPPED | OUTPATIENT
Start: 2024-04-01 | End: 2024-04-06

## 2024-04-01 RX ORDER — FERROUS SULFATE 325(65) MG
325 TABLET ORAL
Status: DISCONTINUED | OUTPATIENT
Start: 2024-04-02 | End: 2024-04-03 | Stop reason: HOSPADM

## 2024-04-01 RX ORDER — SUMATRIPTAN 25 MG/1
25 TABLET, FILM COATED ORAL ONCE AS NEEDED
Status: DISCONTINUED | OUTPATIENT
Start: 2024-04-01 | End: 2024-04-03 | Stop reason: HOSPADM

## 2024-04-01 RX ORDER — FOLIC ACID 1 MG/1
1 TABLET ORAL DAILY
Status: DISCONTINUED | OUTPATIENT
Start: 2024-04-01 | End: 2024-04-03 | Stop reason: HOSPADM

## 2024-04-01 RX ORDER — CALCIUM CARBONATE 500 MG/1
1000 TABLET, CHEWABLE ORAL DAILY PRN
Status: DISCONTINUED | OUTPATIENT
Start: 2024-04-01 | End: 2024-04-03 | Stop reason: HOSPADM

## 2024-04-01 RX ORDER — ONDANSETRON 2 MG/ML
4 INJECTION INTRAMUSCULAR; INTRAVENOUS ONCE AS NEEDED
Status: DISCONTINUED | OUTPATIENT
Start: 2024-04-01 | End: 2024-04-01 | Stop reason: HOSPADM

## 2024-04-01 RX ORDER — BUPIVACAINE HYDROCHLORIDE 7.5 MG/ML
INJECTION, SOLUTION INTRASPINAL AS NEEDED
Status: DISCONTINUED | OUTPATIENT
Start: 2024-04-01 | End: 2024-04-01

## 2024-04-01 RX ORDER — LEVOTHYROXINE SODIUM 0.03 MG/1
25 TABLET ORAL
Status: DISCONTINUED | OUTPATIENT
Start: 2024-04-02 | End: 2024-04-03 | Stop reason: HOSPADM

## 2024-04-01 RX ORDER — PROPOFOL 10 MG/ML
INJECTION, EMULSION INTRAVENOUS CONTINUOUS PRN
Status: DISCONTINUED | OUTPATIENT
Start: 2024-04-01 | End: 2024-04-01

## 2024-04-01 RX ORDER — FENTANYL CITRATE 50 UG/ML
INJECTION, SOLUTION INTRAMUSCULAR; INTRAVENOUS AS NEEDED
Status: DISCONTINUED | OUTPATIENT
Start: 2024-04-01 | End: 2024-04-01

## 2024-04-01 RX ORDER — PROMETHAZINE HYDROCHLORIDE 25 MG/ML
12.5 INJECTION, SOLUTION INTRAMUSCULAR; INTRAVENOUS ONCE AS NEEDED
Status: DISCONTINUED | OUTPATIENT
Start: 2024-04-01 | End: 2024-04-01 | Stop reason: HOSPADM

## 2024-04-01 RX ORDER — GABAPENTIN 400 MG/1
800 CAPSULE ORAL 3 TIMES DAILY
Status: DISCONTINUED | OUTPATIENT
Start: 2024-04-01 | End: 2024-04-03 | Stop reason: HOSPADM

## 2024-04-01 RX ORDER — ONDANSETRON 2 MG/ML
INJECTION INTRAMUSCULAR; INTRAVENOUS AS NEEDED
Status: DISCONTINUED | OUTPATIENT
Start: 2024-04-01 | End: 2024-04-01

## 2024-04-01 RX ORDER — SENNOSIDES 8.6 MG
1 TABLET ORAL DAILY
Status: DISCONTINUED | OUTPATIENT
Start: 2024-04-01 | End: 2024-04-03 | Stop reason: HOSPADM

## 2024-04-01 RX ORDER — ACETAMINOPHEN 325 MG/1
650 TABLET ORAL EVERY 4 HOURS PRN
Status: DISCONTINUED | OUTPATIENT
Start: 2024-04-01 | End: 2024-04-03 | Stop reason: HOSPADM

## 2024-04-01 RX ORDER — PANTOPRAZOLE SODIUM 40 MG/1
40 TABLET, DELAYED RELEASE ORAL DAILY
Status: DISCONTINUED | OUTPATIENT
Start: 2024-04-01 | End: 2024-04-03 | Stop reason: HOSPADM

## 2024-04-01 RX ORDER — TRAZODONE HYDROCHLORIDE 100 MG/1
200 TABLET ORAL
Status: DISCONTINUED | OUTPATIENT
Start: 2024-04-01 | End: 2024-04-03 | Stop reason: HOSPADM

## 2024-04-01 RX ORDER — KETOROLAC TROMETHAMINE 30 MG/ML
30 INJECTION, SOLUTION INTRAMUSCULAR; INTRAVENOUS ONCE
Status: COMPLETED | OUTPATIENT
Start: 2024-04-01 | End: 2024-04-01

## 2024-04-01 RX ORDER — OXYCODONE HYDROCHLORIDE 5 MG/1
5 TABLET ORAL EVERY 4 HOURS PRN
Qty: 42 TABLET | Refills: 0 | Status: SHIPPED | OUTPATIENT
Start: 2024-04-01 | End: 2024-04-09 | Stop reason: SDUPTHER

## 2024-04-01 RX ORDER — BUPIVACAINE HYDROCHLORIDE 5 MG/ML
INJECTION, SOLUTION EPIDURAL; INTRACAUDAL
Status: COMPLETED | OUTPATIENT
Start: 2024-04-01 | End: 2024-04-01

## 2024-04-01 RX ORDER — CELECOXIB 200 MG/1
200 CAPSULE ORAL 2 TIMES DAILY
Qty: 60 CAPSULE | Refills: 0 | Status: SHIPPED | OUTPATIENT
Start: 2024-04-01

## 2024-04-01 RX ORDER — ONDANSETRON 4 MG/1
4 TABLET, ORALLY DISINTEGRATING ORAL EVERY 6 HOURS PRN
Qty: 20 TABLET | Refills: 0 | Status: SHIPPED | OUTPATIENT
Start: 2024-04-01

## 2024-04-01 RX ORDER — LUBIPROSTONE 8 UG/1
8 CAPSULE ORAL 2 TIMES DAILY
Status: DISCONTINUED | OUTPATIENT
Start: 2024-04-01 | End: 2024-04-03 | Stop reason: HOSPADM

## 2024-04-01 RX ORDER — CHLORHEXIDINE GLUCONATE 4 G/100ML
SOLUTION TOPICAL DAILY PRN
Status: DISCONTINUED | OUTPATIENT
Start: 2024-04-01 | End: 2024-04-01 | Stop reason: HOSPADM

## 2024-04-01 RX ORDER — CEFAZOLIN SODIUM 2 G/50ML
2000 SOLUTION INTRAVENOUS EVERY 8 HOURS
Qty: 100 ML | Refills: 0 | Status: COMPLETED | OUTPATIENT
Start: 2024-04-01 | End: 2024-04-02

## 2024-04-01 RX ORDER — SIMETHICONE 80 MG
80 TABLET,CHEWABLE ORAL 4 TIMES DAILY PRN
Status: DISCONTINUED | OUTPATIENT
Start: 2024-04-01 | End: 2024-04-03 | Stop reason: HOSPADM

## 2024-04-01 RX ADMIN — FENTANYL CITRATE 25 MCG: 50 INJECTION INTRAMUSCULAR; INTRAVENOUS at 10:45

## 2024-04-01 RX ADMIN — CHLORHEXIDINE GLUCONATE 15 ML: 1.2 RINSE ORAL at 07:13

## 2024-04-01 RX ADMIN — METOPROLOL TARTRATE 50 MG: 50 TABLET, FILM COATED ORAL at 16:59

## 2024-04-01 RX ADMIN — SODIUM CHLORIDE, SODIUM LACTATE, POTASSIUM CHLORIDE, AND CALCIUM CHLORIDE: .6; .31; .03; .02 INJECTION, SOLUTION INTRAVENOUS at 09:32

## 2024-04-01 RX ADMIN — ASPIRIN 81 MG: 81 TABLET, COATED ORAL at 20:25

## 2024-04-01 RX ADMIN — HYDROMORPHONE HYDROCHLORIDE 0.5 MG: 1 INJECTION, SOLUTION INTRAMUSCULAR; INTRAVENOUS; SUBCUTANEOUS at 11:39

## 2024-04-01 RX ADMIN — ATORVASTATIN CALCIUM 20 MG: 20 TABLET, FILM COATED ORAL at 16:59

## 2024-04-01 RX ADMIN — MULTIPLE VITAMINS W/ MINERALS TAB 1 TABLET: TAB ORAL at 16:59

## 2024-04-01 RX ADMIN — GABAPENTIN 800 MG: 400 CAPSULE ORAL at 20:25

## 2024-04-01 RX ADMIN — ARIPIPRAZOLE 10 MG: 10 TABLET ORAL at 16:59

## 2024-04-01 RX ADMIN — MIDAZOLAM 2 MG: 1 INJECTION INTRAMUSCULAR; INTRAVENOUS at 08:47

## 2024-04-01 RX ADMIN — METFORMIN HYDROCHLORIDE 1000 MG: 500 TABLET ORAL at 16:59

## 2024-04-01 RX ADMIN — ACETAMINOPHEN 325MG 975 MG: 325 TABLET ORAL at 21:07

## 2024-04-01 RX ADMIN — GABAPENTIN 800 MG: 400 CAPSULE ORAL at 16:59

## 2024-04-01 RX ADMIN — MORPHINE SULFATE 2 MG: 2 INJECTION, SOLUTION INTRAMUSCULAR; INTRAVENOUS at 20:30

## 2024-04-01 RX ADMIN — BUPIVACAINE HYDROCHLORIDE 10 ML: 5 INJECTION, SOLUTION EPIDURAL; INTRACAUDAL; PERINEURAL at 08:51

## 2024-04-01 RX ADMIN — OXYCODONE HYDROCHLORIDE 10 MG: 10 TABLET ORAL at 18:21

## 2024-04-01 RX ADMIN — ACETAMINOPHEN 325MG 975 MG: 325 TABLET ORAL at 14:32

## 2024-04-01 RX ADMIN — SENNOSIDES 8.6 MG: 8.6 TABLET, FILM COATED ORAL at 16:59

## 2024-04-01 RX ADMIN — BUPIVACAINE 10 ML: 13.3 INJECTION, SUSPENSION, LIPOSOMAL INFILTRATION at 08:51

## 2024-04-01 RX ADMIN — KETOROLAC TROMETHAMINE 30 MG: 30 INJECTION, SOLUTION INTRAMUSCULAR; INTRAVENOUS at 14:38

## 2024-04-01 RX ADMIN — BUPIVACAINE HYDROCHLORIDE IN DEXTROSE 1.4 ML: 7.5 INJECTION, SOLUTION SUBARACHNOID at 09:01

## 2024-04-01 RX ADMIN — HYDROCHLOROTHIAZIDE 12.5 MG: 12.5 TABLET ORAL at 16:59

## 2024-04-01 RX ADMIN — SODIUM CHLORIDE, SODIUM LACTATE, POTASSIUM CHLORIDE, AND CALCIUM CHLORIDE 100 ML/HR: .6; .31; .03; .02 INJECTION, SOLUTION INTRAVENOUS at 22:32

## 2024-04-01 RX ADMIN — SODIUM CHLORIDE, SODIUM LACTATE, POTASSIUM CHLORIDE, AND CALCIUM CHLORIDE 125 ML/HR: .6; .31; .03; .02 INJECTION, SOLUTION INTRAVENOUS at 07:14

## 2024-04-01 RX ADMIN — PANTOPRAZOLE SODIUM 40 MG: 40 TABLET, DELAYED RELEASE ORAL at 16:59

## 2024-04-01 RX ADMIN — TRAZODONE HYDROCHLORIDE 200 MG: 100 TABLET ORAL at 21:07

## 2024-04-01 RX ADMIN — FENTANYL CITRATE 25 MCG: 50 INJECTION INTRAMUSCULAR; INTRAVENOUS at 10:56

## 2024-04-01 RX ADMIN — OXYCODONE 5 MG: 5 TABLET ORAL at 13:51

## 2024-04-01 RX ADMIN — ONDANSETRON 4 MG: 2 INJECTION INTRAMUSCULAR; INTRAVENOUS at 10:02

## 2024-04-01 RX ADMIN — HYDROMORPHONE HYDROCHLORIDE 0.5 MG: 1 INJECTION, SOLUTION INTRAMUSCULAR; INTRAVENOUS; SUBCUTANEOUS at 11:09

## 2024-04-01 RX ADMIN — CEFAZOLIN SODIUM 2000 MG: 2 SOLUTION INTRAVENOUS at 19:37

## 2024-04-01 RX ADMIN — CEFAZOLIN SODIUM 2000 MG: 2 SOLUTION INTRAVENOUS at 08:54

## 2024-04-01 RX ADMIN — CLONAZEPAM 0.5 MG: 0.5 TABLET ORAL at 17:40

## 2024-04-01 RX ADMIN — DOCUSATE SODIUM 100 MG: 100 CAPSULE, LIQUID FILLED ORAL at 20:25

## 2024-04-01 RX ADMIN — FENTANYL CITRATE 100 MCG: 50 INJECTION INTRAMUSCULAR; INTRAVENOUS at 08:47

## 2024-04-01 RX ADMIN — FOLIC ACID 1 MG: 1 TABLET ORAL at 16:59

## 2024-04-01 RX ADMIN — TRANEXAMIC ACID 1000 MG: 10 INJECTION, SOLUTION INTRAVENOUS at 09:08

## 2024-04-01 RX ADMIN — OXYCODONE HYDROCHLORIDE AND ACETAMINOPHEN 500 MG: 500 TABLET ORAL at 20:25

## 2024-04-01 RX ADMIN — PROPOFOL 120 MCG/KG/MIN: 10 INJECTION, EMULSION INTRAVENOUS at 09:01

## 2024-04-01 RX ADMIN — ACETAMINOPHEN 325MG 975 MG: 325 TABLET ORAL at 07:13

## 2024-04-01 RX ADMIN — LUBIPROSTONE 8 MCG: 8 CAPSULE, GELATIN COATED ORAL at 20:25

## 2024-04-01 NOTE — ANESTHESIA POSTPROCEDURE EVALUATION
Post-Op Assessment Note    CV Status:  Stable  Pain Score: 6    Pain management: adequate       Mental Status:  Alert and awake   Hydration Status:  Euvolemic   PONV Controlled:  Controlled   Airway Patency:  Patent  Two or more mitigation strategies used for obstructive sleep apnea   Post Op Vitals Reviewed: Yes    No anethesia notable event occurred.    Staff: AnesthesiologistJAROD           /64   Pulse 72   Temp (!) 97 °F (36.1 °C)   Resp (!) 24   Wt 77.9 kg (171 lb 11.8 oz)   SpO2 96%   BMI 30.42 kg/m²       BP      Temp      Pulse     Resp      SpO2

## 2024-04-01 NOTE — PHYSICAL THERAPY NOTE
04/01/24 1530   PT Last Visit   PT Visit Date 04/01/24   Note Type   Note type Cancelled Session   Additional Comments PT orders recieved, pt was suppose to be seen in PACU, but RN notified PT that pt is being admitted due to high pain levels (10/10) and medication not helping. Not appropriate for evaluation at this time, will defer evaluation to tomorrow for PT.     Angela Smith, PT

## 2024-04-01 NOTE — ANESTHESIA PREPROCEDURE EVALUATION
Procedure:  ARTHROPLASTY KNEE TOTAL- possible same day (Right: Knee)    Relevant Problems   CARDIO   (+) Essential hypertension   (+) Hyperlipemia   (+) Hypertensive urgency   (+) Migraine without aura and without status migrainosus, not intractable      ENDO   (+) Acquired hypothyroidism   (+) Diabetes mellitus type 2 in obese   (+) Hypothyroidism      GI/HEPATIC   (+) Gastroesophageal reflux disease without esophagitis      MUSCULOSKELETAL   (+) Primary osteoarthritis of right knee      NEURO/PSYCH   (+) Bipolar disorder current episode depressed (HCC)   (+) Diabetic gastroparesis  (HCC)   (+) Generalized anxiety disorder   (+) Migraine without aura and without status migrainosus, not intractable   (+) Post-traumatic stress disorder, chronic      PULMONARY   (+) Pulmonary emphysema, unspecified emphysema type (HCC)        Physical Exam    Airway      TM Distance: >3 FB  Neck ROM: full     Dental   No notable dental hx     Cardiovascular  Rhythm: regular, Rate: normal, Cardiovascular exam normal    Pulmonary  Pulmonary exam normal Breath sounds clear to auscultation    Other Findings  post-pubertal.      Anesthesia Plan  ASA Score- 2     Anesthesia Type- spinal with ASA Monitors.         Additional Monitors:     Airway Plan:     Comment: Adductor block  Pt has slurred speech in SDS.  States she took Clonazepam at 430 this morning. There is a history of substance abuse on chart.  Will get drug and alcohol screen for now.  .       Plan Factors-    Chart reviewed.   Existing labs reviewed. Patient summary reviewed.                  Induction-     Postoperative Plan- Plan for postoperative opioid use.     Informed Consent- Anesthetic plan and risks discussed with patient.

## 2024-04-01 NOTE — ANESTHESIA PROCEDURE NOTES
Spinal Block    Patient location during procedure: OR  Start time: 4/1/2024 9:01 AM  Reason for block: procedure for pain, at surgeon's request, post-op pain management and primary anesthetic  Staffing  Performed by: Cindy Aponte CRNA  Authorized by: Corie Mueller DO    Preanesthetic Checklist  Completed: patient identified, IV checked, site marked, risks and benefits discussed, surgical consent, monitors and equipment checked, pre-op evaluation and timeout performed  Spinal Block  Patient position: sitting  Prep: Betadine  Patient monitoring: heart rate, cardiac monitor, continuous pulse ox and frequent blood pressure checks  Approach: midline  Location: L3-4  Injection technique: single-shot  Needle  Needle type: pencil-tip   Needle gauge: 25 G  Needle length: 5 cm  Assessment  Sensory level: T4  Injection Assessment:  positive aspiration for clear CSF.  Post-procedure:  site cleaned  Additional Notes  Spinal block placed by srna student merle

## 2024-04-01 NOTE — OP NOTE
OPERATIVE REPORT  PATIENT NAME: Bhavana Smith  : 1965  MRN: [id number]  Pt Location:  [facility] OR ROOM 03    Surgery Date: 2024    Surgeons and Role:     * Rosas Vincent, DO - Primary     * SOO Simms-C - Assisting      * Shira Cintron AT-C - Assisting     Preop Diagnosis:  Primary osteoarthritis of right knee [M17.11]  Chronic pain of right knee [M25.561, G89.29]    Post-Op Diagnosis Codes:     * Primary osteoarthritis of right knee [M17.11]     * Chronic pain of right knee [M25.561, G89.29]    Procedure(s):  Right - ARTHROPLASTY KNEE TOTAL- possible same day    Specimens:  * No specimens in log *    Estimated Blood Loss:   100 mL    Drains:  * No LDAs found *    Anesthesia Type:   Spinal      Operative Indications:  Primary osteoarthritis of right knee [M17.11]  Chronic pain of right knee [M25.561, G89.29]    57 y/o female with right knee pain secondary to severe OA.  Xrays of the right knee reviewed showing severe arthritic changes with decreased joint space, osteophyte formation, and varus alignment.  On physical exam she has significant limited range of motion with pain as well as pain over the medial joint line and a large effusion. She has tried and failed treatment options including multiple injections, NSAIDs, PT, HEP, and activity modifications. Her pain is affecting her ADLs. The patient has elected to proceed with Right TKA. Risks and benefits of surgery to include but not limited to bleeding, infection, damage to surrounding structures, hardware failure, instability, fracture, dislocation, need for further surgery, continued pain, stiffness, blood clots, stroke, and heart attack was discussed with the patient.     Operative Findings:  Severe tricompartmental osteoarthritis with Grade IV changes medial and PF compartments  Numerous loose bodies in the posterior knee   Trochlear dysplasia   Pre-op ROM 5-110  Post-op ROM 0-130+ calf to thigh gravity-assisted flexion    Implant Name  Type Inv. Item Serial No.  Lot No. LRB No. Used Action   CEMENT BONE SMART SET GRAY MED VISC - [id number]  DEPUY BONE CEMENT CMW2  DEPUY [id number] Right 1 Implanted   COMPONENT FEM SZ 6 RT NRW  CR ATTUNE - [id number]  COMPONENT FEM SZ 6 RT NRW  CR ATTUNE  DEPUY [id number] Right 1 Implanted   INSERT TIB SZ 6 5MM RT MED STAB ATTUNE - [id number]  INSERT TIB SZ 6 5MM RT MED STAB ATTUNE  DEPUY [id number] Right 1 Implanted   COMPONENT PATELLAR 32MM MEDIAL DOME ATTUNE - [id number]  COMPONENT PATELLAR 32MM MEDIAL DOME ATTUNE  DEPUY [id number] Right 1 Implanted   BASEPLATE TIBIAL SZ 4 FX BRNG  ATTUNE - [id number]  BASEPLATE TIBIAL SZ 4 FX BRNG  ATTUNE  DEPUY [id number] Right 1 Implanted     Complications:   None    Knee Technique: Suture (direct) Repair  Knee Approach: Medial Parapatellar    Procedure and Technique:  Patient was seen in the preoperative holding area.  Informed consent was confirmed and all questions were answered. Operative site was confirmed and marked. Patient was taken to the operating room and transferred to the operating room table. Anesthesia was performed. The patient was then placed supine and all bony prominences were well-padded. Right lower extremity was prepped and draped in usual sterile fashion with chlorhexidine scrub.  Patient was given perioperative antibiotics prior to incision and SCDs were placed on the non-operative leg.  A formal time-out was performed identifying the patient and confirmed operative site.  The knee was exsanguinated and a pneumatic tourniquet was inflated at 250 mmHg.  Her previous anterior knee incision was resected down to the level of the extensor mechanism.  A small medial skin flap was created.  A medial parapatellar approach was performed into the knee being careful to avoid the patellar tendon.  The anterior horn of the medial and lateral meniscus was released.  The medial peel was performed to the mid coronal line.  Lateral patellofemoral plica was excised and  the patella was everted.  The fat pad was removed being careful to avoid the patellar tendon.  Peripheral osteophytes removed at this time as was the anterior cruciate ligament.  The intramedullary femoral drill was now used just medial and anterior to the PCL insertion at the sulcus terminalis.  A drop dion was used to confirm intramedullary placement of the drill.  The distal femoral cutting jig was now inserted into the intramedullary canal set to 5° of valgus per pre-op template and 9 mm distal resection.  Distal resection was checked with an Sinan wing and deemed appropriate.  The distal femur was cut.  At this time the distal femoral cutting guide was removed and the sizing guide was placed on the distal femur.  A posterior referencing system was used and pins placed with 3° of external rotation.  The femur was sized to the above size.  The appropriate cutting block was then placed over the pins and rotation was confirmed being parallel to the epicondylar access and perpendicular to Whitesides line.  Retractors were placed to protect the collateral ligaments and the anterior, posterior, posterior chamfer, anterior chamfer was cut. The distal 5th cut was also performed.  Bone fragments were removed with curved osteotome and then posterior Hohmann was placed to sublux the tibia forward.  An extramedullary tibial guide was used veing cognizant of varus valgus alignment, slope and depth of resection.  The guide was pinned in place and then a drop dion was used to confirm appropriate alignment.  The tibia was cut and removed.  At this time the bilateral menisci and posterior osteophytes of the femur were resected with the use of a laminar .  Once adequate osteophytes were removed the knee was trialed with the above implants.  The knee was found to have excellent stability with a 5 mm MS insert.  At this time the patella was measured and resected to appropriate depth.  The patella sized to the above size and 3  drill holes were performed.  At this time the knee was again taken through range of motion and found to have excellent stability and excellent patellar tracking.  The trials were floated and rotation of tibia marked.  The femoral lug drills were drilled.  The femur and trial poly were removed and posterior Hohmann placed to sublux the tibia forward.  The Press-Fit tibial base plate was then aligned on the cut surface of the tibia matching where the trial was floated at approximately the medial 1/3 the tibial tubercle.  The base plate was pinned in place and prep tower impacted.  The Boss Reamer for the Press-Fit tibia was used 1st followed by keel punch.  The peripheral holes were then drilled as well.  At this time all trials were removed and the knee was copiously irrigated with normal saline solution.  The Press-Fit base plate was impacted into place and assured to be flush in all corners.  The MS polyethylene was impacted into the clean tray. The posterior Hohmann was then removed.  The Press-Fit femoral component was impacted in place and assured to be flush on all bony surfaces.  The patella cut surface was dried and the domed patella was cemented into place and held with a clamp.  Peripheral cement was cleared and the clamp was held until cement cured.  The tourniquet was released at 30 minutes and all bleeders were coagulated.  The knee was irrigated with Irrisept solution and then the remainder of the 3 L of normal saline solution.  The joint local was injected in the posterior capsule and around the tissues of the knee.  The knee was taken through a final range of motion and found to have excellent stability and patellar tracking.  All instrument and sponge counts were correct x2.  The arthrotomy was closed with #1 Stratafix suture.  Subcutaneous tissues were closed with 2-0 Vicryl.  The skin was closed with 3-0 Stratafix followed by Dermabond.  A sterile Mepilex was placed along with a thigh foot Ace  wrap.  Patient was awoken from anesthesia and taken to recovery room in stable condition.    58F s/p R TKA 4/1  - multi-modal pain control  - ancef x 24 hrs, duricef x 5 days as planned same day discharge   - DVT ppx: aspirin 81mg BID x 4 weeks  - PT/OT  - WBAT  - ROM as tolerated, pillow/blankets under achilles not behind knee while in bed  - f/u 10-14 days        I was present for all critical portions of the procedure., A qualified resident physician was not available., and A physician assistant was required during the procedure for retraction, tissue handling, dissection and suturing.    Patient Disposition:  PACU       SIGNATURE: Rosas Vincent DO  DATE: April 1, 2024  TIME: 10:19 AM

## 2024-04-01 NOTE — PLAN OF CARE
Problem: PAIN - ADULT  Goal: Verbalizes/displays adequate comfort level or baseline comfort level  Description: Interventions:  - Encourage patient to monitor pain and request assistance  - Assess pain using appropriate pain scale  - Administer analgesics based on type and severity of pain and evaluate response  - Implement non-pharmacological measures as appropriate and evaluate response  - Consider cultural and social influences on pain and pain management  - Notify physician/advanced practitioner if interventions unsuccessful or patient reports new pain  Outcome: Progressing     Problem: INFECTION - ADULT  Goal: Absence or prevention of progression during hospitalization  Description: INTERVENTIONS:  - Assess and monitor for signs and symptoms of infection  - Monitor lab/diagnostic results  - Monitor all insertion sites, i.e. indwelling lines, tubes, and drains  - Monitor endotracheal if appropriate and nasal secretions for changes in amount and color  - [place] appropriate cooling/warming therapies per order  - Administer medications as ordered  - Instruct and encourage patient and family to use good hand hygiene technique  - Identify and instruct in appropriate isolation precautions for identified infection/condition  Outcome: Progressing  Goal: Absence of fever/infection during neutropenic period  Description: INTERVENTIONS:  - Monitor WBC    Outcome: Progressing     Problem: SAFETY ADULT  Goal: Patient will remain free of falls  Description: INTERVENTIONS:  - Educate patient/family on patient safety including physical limitations  - Instruct patient to call for assistance with activity   - Consult OT/PT to assist with strengthening/mobility   - Keep Call bell within reach  - Keep bed low and locked with side rails adjusted as appropriate  - Keep care items and personal belongings within reach  - Initiate and maintain comfort rounds  - Make Fall Risk Sign visible to staff  - Offer Toileting every 2 Hours,  in advance of need  - Initiate/Maintain bed/chair alarm  - Obtain necessary fall risk management equipment: walker   - Apply yellow socks and bracelet for high fall risk patients  - Consider moving patient to room near nurses station  Outcome: Progressing  Goal: Maintain or return to baseline ADL function  Description: INTERVENTIONS:  -  Assess patient's ability to carry out ADLs; assess patient's baseline for ADL function and identify physical deficits which impact ability to perform ADLs (bathing, care of mouth/teeth, toileting, grooming, dressing, etc.)  - Assess/evaluate cause of self-care deficits   - Assess range of motion  - Assess patient's mobility; develop plan if impaired  - Assess patient's need for assistive devices and provide as appropriate  - Encourage maximum independence but intervene and supervise when necessary  - Involve family in performance of ADLs  - Assess for home care needs following discharge   - Consider OT consult to assist with ADL evaluation and planning for discharge  - Provide patient education as appropriate  Outcome: Progressing  Goal: Maintains/Returns to pre admission functional level  Description: INTERVENTIONS:  - Perform AM-PAC 6 Click Basic Mobility/ Daily Activity assessment daily.  - Set and communicate daily mobility goal to care team and patient/family/caregiver.   - Collaborate with rehabilitation services on mobility goals if consulted  - Perform Range of Motion 3 times a day.  - Reposition patient every 2 hours.  - Dangle patient 3 times a day  - Stand patient 3 times a day  - Ambulate patient 3 times a day  - Out of bed to chair 3 times a day   - Out of bed for meals 3 times a day  - Out of bed for toileting  - Record patient progress and toleration of activity level   Outcome: Progressing     Problem: DISCHARGE PLANNING  Goal: Discharge to home or other facility with appropriate resources  Description: INTERVENTIONS:  - Identify barriers to discharge w/patient and  caregiver  - Arrange for needed discharge resources and transportation as appropriate  - Identify discharge learning needs (meds, wound care, etc.)  - Arrange for interpretive services to assist at discharge as needed  - Refer to Case Management Department for coordinating discharge planning if the patient needs post-hospital services based on physician/advanced practitioner order or complex needs related to functional status, cognitive ability, or social support system  Outcome: Progressing     Problem: Knowledge Deficit  Goal: Patient/family/caregiver demonstrates understanding of disease process, treatment plan, medications, and discharge instructions  Description: Complete learning assessment and assess knowledge base.  Interventions:  - Provide teaching at level of understanding  - Provide teaching via preferred learning methods  Outcome: Progressing

## 2024-04-01 NOTE — PLAN OF CARE
Problem: PAIN - ADULT  Goal: Verbalizes/displays adequate comfort level or baseline comfort level  Description: Interventions:  - Encourage patient to monitor pain and request assistance  - Assess pain using appropriate pain scale  - Administer analgesics based on type and severity of pain and evaluate response  - Implement non-pharmacological measures as appropriate and evaluate response  - Consider cultural and social influences on pain and pain management  - Notify physician/advanced practitioner if interventions unsuccessful or patient reports new pain  Outcome: Progressing     Problem: INFECTION - ADULT  Goal: Absence or prevention of progression during hospitalization  Description: INTERVENTIONS:  - Assess and monitor for signs and symptoms of infection  - Monitor lab/diagnostic results  - Monitor all insertion sites, i.e. indwelling lines, tubes, and drains  - Monitor endotracheal if appropriate and nasal secretions for changes in amount and color  - [place] appropriate cooling/warming therapies per order  - Administer medications as ordered  - Instruct and encourage patient and family to use good hand hygiene technique  - Identify and instruct in appropriate isolation precautions for identified infection/condition  Outcome: Progressing     Problem: INFECTION - ADULT  Goal: Absence of fever/infection during neutropenic period  Description: INTERVENTIONS:  - Monitor WBC    Outcome: Progressing     Problem: SAFETY ADULT  Goal: Patient will remain free of falls  Description: INTERVENTIONS:  - Educate patient/family on patient safety including physical limitations  - Instruct patient to call for assistance with activity   - Consult OT/PT to assist with strengthening/mobility   - Keep Call bell within reach  - Keep bed low and locked with side rails adjusted as appropriate  - Keep care items and personal belongings within reach  - Initiate and maintain comfort rounds  - Make Fall Risk Sign visible to staff  -  Offer Toileting every 2 Hours, in advance of need  - Initiate/Maintain bed alarm  - Obtain necessary fall risk management equipment: bed alarm, call bell,  socks  - Apply yellow socks and bracelet for high fall risk patients  - Consider moving patient to room near nurses station  Outcome: Progressing     Problem: SAFETY ADULT  Goal: Maintain or return to baseline ADL function  Description: INTERVENTIONS:  -  Assess patient's ability to carry out ADLs; assess patient's baseline for ADL function and identify physical deficits which impact ability to perform ADLs (bathing, care of mouth/teeth, toileting, grooming, dressing, etc.)  - Assess/evaluate cause of self-care deficits   - Assess range of motion  - Assess patient's mobility; develop plan if impaired  - Assess patient's need for assistive devices and provide as appropriate  - Encourage maximum independence but intervene and supervise when necessary  - Involve family in performance of ADLs  - Assess for home care needs following discharge   - Consider OT consult to assist with ADL evaluation and planning for discharge  - Provide patient education as appropriate  Outcome: Progressing     Problem: SAFETY ADULT  Goal: Maintains/Returns to pre admission functional level  Description: INTERVENTIONS:  - Perform AM-PAC 6 Click Basic Mobility/ Daily Activity assessment daily.  - Set and communicate daily mobility goal to care team and patient/family/caregiver.   - Collaborate with rehabilitation services on mobility goals if consulted  - Perform Range of Motion 3 times a day.  - Reposition patient every 2 hours.  - Dangle patient 3 times a day  - Stand patient 3 times a day  - Ambulate patient 3 times a day  - Out of bed to chair 3 times a day   - Out of bed for meals 3 times a day  - Out of bed for toileting  - Record patient progress and toleration of activity level   Outcome: Progressing

## 2024-04-01 NOTE — ANESTHESIA PROCEDURE NOTES
Peripheral Block    Patient location during procedure: holding area  Start time: 4/1/2024 8:46 AM  Reason for block: at surgeon's request and post-op pain management  Staffing  Performed by: Corie Mueller DO  Authorized by: Corie Mueller DO    Preanesthetic Checklist  Completed: patient identified, IV checked, site marked, risks and benefits discussed, surgical consent, monitors and equipment checked, pre-op evaluation and timeout performed  Peripheral Block  Patient position: supine  Prep: ChloraPrep  Patient monitoring: frequent blood pressure checks, continuous pulse oximetry and heart rate  Block type: Adductor Canal  Laterality: right  Injection technique: single-shot  Procedures: ultrasound guided, Ultrasound guidance required for the procedure to increase accuracy and safety of medication placement and decrease risk of complications.  Ultrasound permanent image savedbupivacaine (PF) (MARCAINE) 0.5 % injection 20 mL - Perineural   10 mL - 4/1/2024 8:51:00 AM  bupivacaine liposomal (EXPAREL) 1.3 % injection 20 mL - Perineural   10 mL - 4/1/2024 8:51:00 AM  Needle  Needle type: Stimuplex   Needle gauge: 18 G  Needle length: 6 in  Needle localization: anatomical landmarks and ultrasound guidance  Assessment  Injection assessment: incremental injection, frequent aspiration, injected with ease, negative aspiration, negative for heart rate change, no paresthesia on injection, no symptoms of intraneural/intravenous injection and needle tip visualized at all times  Paresthesia pain: none  Post-procedure:  site cleaned  patient tolerated the procedure well with no immediate complications

## 2024-04-01 NOTE — NURSING NOTE
Report given to MARK ANTHONY Luna on 2E for continuation of care. Son called and given message for room assignment

## 2024-04-01 NOTE — INTERVAL H&P NOTE
H&P reviewed. After examining the patient I find no changes in the patients condition since the H&P had been written. Plan for right TKA. Patient with slow spec this morning. Likely related to her klonopin she took at 0430 this morning but regardless will complete drug and alcohol screen.

## 2024-04-01 NOTE — OCCUPATIONAL THERAPY NOTE
Occupational Therapy         Patient Name: Bhavana Smith  Today's Date: 4/1/2024 04/01/24 1624   OT Last Visit   OT Visit Date 04/01/24   Note Type   Note type Evaluation;Cancelled Session   Cancel Reasons Other   Additional Comments OT consult received, chart reviewed. Per MARK ANTHONY Luna, pt not appropriate for therapy services this date due to 10/10 pain. Recommendation from RN is to defer evaluations until tomorrow. Will continue to follow pt and see as clincally appropriate.     Gayle Timmons

## 2024-04-02 ENCOUNTER — PATIENT OUTREACH (OUTPATIENT)
Dept: FAMILY MEDICINE CLINIC | Facility: CLINIC | Age: 59
End: 2024-04-02

## 2024-04-02 PROBLEM — D64.9 ANEMIA: Status: ACTIVE | Noted: 2024-04-02

## 2024-04-02 LAB
ANION GAP SERPL CALCULATED.3IONS-SCNC: 6 MMOL/L (ref 4–13)
BUN SERPL-MCNC: 9 MG/DL (ref 5–25)
CALCIUM SERPL-MCNC: 8.6 MG/DL (ref 8.4–10.2)
CHLORIDE SERPL-SCNC: 99 MMOL/L (ref 96–108)
CO2 SERPL-SCNC: 29 MMOL/L (ref 21–32)
CREAT SERPL-MCNC: 0.55 MG/DL (ref 0.6–1.3)
ERYTHROCYTE [DISTWIDTH] IN BLOOD BY AUTOMATED COUNT: 13.1 % (ref 11.6–15.1)
GFR SERPL CREATININE-BSD FRML MDRD: 105 ML/MIN/1.73SQ M
GLUCOSE SERPL-MCNC: 124 MG/DL (ref 65–140)
GLUCOSE SERPL-MCNC: 127 MG/DL (ref 65–140)
GLUCOSE SERPL-MCNC: 136 MG/DL (ref 65–140)
HCT VFR BLD AUTO: 28.3 % (ref 34.8–46.1)
HGB BLD-MCNC: 9.6 G/DL (ref 11.5–15.4)
MCH RBC QN AUTO: 29.4 PG (ref 26.8–34.3)
MCHC RBC AUTO-ENTMCNC: 33.9 G/DL (ref 31.4–37.4)
MCV RBC AUTO: 87 FL (ref 82–98)
PLATELET # BLD AUTO: 219 THOUSANDS/UL (ref 149–390)
PMV BLD AUTO: 9.4 FL (ref 8.9–12.7)
POTASSIUM SERPL-SCNC: 3.7 MMOL/L (ref 3.5–5.3)
RBC # BLD AUTO: 3.26 MILLION/UL (ref 3.81–5.12)
SODIUM SERPL-SCNC: 134 MMOL/L (ref 135–147)
WBC # BLD AUTO: 10.8 THOUSAND/UL (ref 4.31–10.16)

## 2024-04-02 PROCEDURE — 97116 GAIT TRAINING THERAPY: CPT

## 2024-04-02 PROCEDURE — 97167 OT EVAL HIGH COMPLEX 60 MIN: CPT

## 2024-04-02 PROCEDURE — 80048 BASIC METABOLIC PNL TOTAL CA: CPT | Performed by: PHYSICIAN ASSISTANT

## 2024-04-02 PROCEDURE — 85027 COMPLETE CBC AUTOMATED: CPT | Performed by: PHYSICIAN ASSISTANT

## 2024-04-02 PROCEDURE — 82948 REAGENT STRIP/BLOOD GLUCOSE: CPT

## 2024-04-02 PROCEDURE — 97163 PT EVAL HIGH COMPLEX 45 MIN: CPT

## 2024-04-02 PROCEDURE — 99024 POSTOP FOLLOW-UP VISIT: CPT | Performed by: STUDENT IN AN ORGANIZED HEALTH CARE EDUCATION/TRAINING PROGRAM

## 2024-04-02 PROCEDURE — 97530 THERAPEUTIC ACTIVITIES: CPT

## 2024-04-02 PROCEDURE — 99222 1ST HOSP IP/OBS MODERATE 55: CPT | Performed by: FAMILY MEDICINE

## 2024-04-02 RX ORDER — INSULIN LISPRO 100 [IU]/ML
1-6 INJECTION, SOLUTION INTRAVENOUS; SUBCUTANEOUS
Status: DISCONTINUED | OUTPATIENT
Start: 2024-04-02 | End: 2024-04-03 | Stop reason: HOSPADM

## 2024-04-02 RX ORDER — NICOTINE 21 MG/24HR
21 PATCH, TRANSDERMAL 24 HOURS TRANSDERMAL DAILY
Status: DISCONTINUED | OUTPATIENT
Start: 2024-04-02 | End: 2024-04-03 | Stop reason: HOSPADM

## 2024-04-02 RX ADMIN — ASPIRIN 81 MG: 81 TABLET, COATED ORAL at 17:50

## 2024-04-02 RX ADMIN — GABAPENTIN 800 MG: 400 CAPSULE ORAL at 22:12

## 2024-04-02 RX ADMIN — METFORMIN HYDROCHLORIDE 1000 MG: 500 TABLET ORAL at 07:19

## 2024-04-02 RX ADMIN — ASPIRIN 81 MG: 81 TABLET, COATED ORAL at 08:41

## 2024-04-02 RX ADMIN — CEFAZOLIN SODIUM 2000 MG: 2 SOLUTION INTRAVENOUS at 03:47

## 2024-04-02 RX ADMIN — OXYCODONE HYDROCHLORIDE 10 MG: 10 TABLET ORAL at 07:19

## 2024-04-02 RX ADMIN — GABAPENTIN 800 MG: 400 CAPSULE ORAL at 08:41

## 2024-04-02 RX ADMIN — SENNOSIDES 8.6 MG: 8.6 TABLET, FILM COATED ORAL at 08:41

## 2024-04-02 RX ADMIN — GABAPENTIN 800 MG: 400 CAPSULE ORAL at 16:35

## 2024-04-02 RX ADMIN — LUBIPROSTONE 8 MCG: 8 CAPSULE, GELATIN COATED ORAL at 08:40

## 2024-04-02 RX ADMIN — MORPHINE SULFATE 2 MG: 2 INJECTION, SOLUTION INTRAMUSCULAR; INTRAVENOUS at 03:48

## 2024-04-02 RX ADMIN — PANTOPRAZOLE SODIUM 40 MG: 40 TABLET, DELAYED RELEASE ORAL at 08:42

## 2024-04-02 RX ADMIN — ACETAMINOPHEN 325MG 975 MG: 325 TABLET ORAL at 05:48

## 2024-04-02 RX ADMIN — LUBIPROSTONE 8 MCG: 8 CAPSULE, GELATIN COATED ORAL at 22:12

## 2024-04-02 RX ADMIN — OXYCODONE HYDROCHLORIDE 10 MG: 10 TABLET ORAL at 01:04

## 2024-04-02 RX ADMIN — DOCUSATE SODIUM 100 MG: 100 CAPSULE, LIQUID FILLED ORAL at 22:12

## 2024-04-02 RX ADMIN — HYDROCHLOROTHIAZIDE 12.5 MG: 12.5 TABLET ORAL at 08:41

## 2024-04-02 RX ADMIN — FERROUS SULFATE TAB 325 MG (65 MG ELEMENTAL FE) 325 MG: 325 (65 FE) TAB at 07:19

## 2024-04-02 RX ADMIN — OXYCODONE HYDROCHLORIDE AND ACETAMINOPHEN 500 MG: 500 TABLET ORAL at 08:42

## 2024-04-02 RX ADMIN — METOPROLOL TARTRATE 50 MG: 50 TABLET, FILM COATED ORAL at 16:35

## 2024-04-02 RX ADMIN — ACETAMINOPHEN 325MG 975 MG: 325 TABLET ORAL at 22:11

## 2024-04-02 RX ADMIN — ACETAMINOPHEN 325MG 975 MG: 325 TABLET ORAL at 13:57

## 2024-04-02 RX ADMIN — OXYCODONE HYDROCHLORIDE 10 MG: 10 TABLET ORAL at 16:35

## 2024-04-02 RX ADMIN — ATORVASTATIN CALCIUM 20 MG: 20 TABLET, FILM COATED ORAL at 16:35

## 2024-04-02 RX ADMIN — OXYCODONE HYDROCHLORIDE AND ACETAMINOPHEN 500 MG: 500 TABLET ORAL at 22:12

## 2024-04-02 RX ADMIN — MULTIPLE VITAMINS W/ MINERALS TAB 1 TABLET: TAB ORAL at 08:41

## 2024-04-02 RX ADMIN — LEVOTHYROXINE SODIUM 25 MCG: 25 TABLET ORAL at 05:48

## 2024-04-02 RX ADMIN — ARIPIPRAZOLE 10 MG: 10 TABLET ORAL at 08:42

## 2024-04-02 RX ADMIN — METOPROLOL TARTRATE 50 MG: 50 TABLET, FILM COATED ORAL at 03:48

## 2024-04-02 RX ADMIN — DOCUSATE SODIUM 100 MG: 100 CAPSULE, LIQUID FILLED ORAL at 08:42

## 2024-04-02 RX ADMIN — FOLIC ACID 1 MG: 1 TABLET ORAL at 08:42

## 2024-04-02 NOTE — PLAN OF CARE
Problem: OCCUPATIONAL THERAPY ADULT  Goal: Performs self-care activities at highest level of function for planned discharge setting.  See evaluation for individualized goals.  Description: Treatment Interventions: ADL retraining, Functional transfer training, UE strengthening/ROM, Cognitive reorientation, Patient/family training, Equipment evaluation/education, Compensatory technique education, Continued evaluation          See flowsheet documentation for full assessment, interventions and recommendations.   Note: Limitation: Decreased ADL status, Decreased UE strength, Decreased Safe judgement during ADL, Decreased endurance, Decreased high-level ADLs  Prognosis: Fair  Assessment: Pt is a 58 y.o female who presented to [facility] on 4/1/2024 for an elective TKR 2* to OA and chronic R knee pain. Per ortho pt is currently WBAT R LE. Pt with PMH of drug addiction, substance abuse, adjustment disorder, bipolar disorder, alcoholism, depression, DM, head injury, hld, hypothyroidism, withdraw symptoms, and b/l knee surgery (1991). Pt states PTA she was independent with ADL/IADLs, transfers, and functional mobility - w/o use of AD (reports furniture walking in home). Pt reports furniture walking within her home. Pt states (-) falls, (-) , (-) home alone. During OT initial evaluation pt demonstrated deficits with ADL status, transfer safety, b/l UE strength, functional balance, activity tolerance (currently fair = 15-20mins), and functional mobility. Pt would benefit from continued IP OT services at this time focused on the above stated defcitis. 3-5x/1-2weeks. The patient's raw score on the AM-PAC Daily Activity Inpatient Short Form is 16. A raw score of less than 19 suggests the patient may benefit from discharge to post-acute rehabilitation services. Please refer to the recommendation of the Occupational Therapist for safe discharge planning.     Rehab Resource Intensity Level, OT: II (Moderate Resource Intensity)

## 2024-04-02 NOTE — PLAN OF CARE
Problem: PAIN - ADULT  Goal: Verbalizes/displays adequate comfort level or baseline comfort level  Description: Interventions:  - Encourage patient to monitor pain and request assistance  - Assess pain using appropriate pain scale  - Administer analgesics based on type and severity of pain and evaluate response  - Implement non-pharmacological measures as appropriate and evaluate response  - Consider cultural and social influences on pain and pain management  - Notify physician/advanced practitioner if interventions unsuccessful or patient reports new pain  Outcome: Progressing     Problem: INFECTION - ADULT  Goal: Absence or prevention of progression during hospitalization  Description: INTERVENTIONS:  - Assess and monitor for signs and symptoms of infection  - Monitor lab/diagnostic results  - Monitor all insertion sites, i.e. indwelling lines, tubes, and drains  - Monitor endotracheal if appropriate and nasal secretions for changes in amount and color  - [place] appropriate cooling/warming therapies per order  - Administer medications as ordered  - Instruct and encourage patient and family to use good hand hygiene technique  - Identify and instruct in appropriate isolation precautions for identified infection/condition  Outcome: Progressing     Problem: INFECTION - ADULT  Goal: Absence of fever/infection during neutropenic period  Description: INTERVENTIONS:  - Monitor WBC    Outcome: Progressing     Problem: SAFETY ADULT  Goal: Patient will remain free of falls  Description: INTERVENTIONS:  - Educate patient/family on patient safety including physical limitations  - Instruct patient to call for assistance with activity   - Consult OT/PT to assist with strengthening/mobility   - Keep Call bell within reach  - Keep bed low and locked with side rails adjusted as appropriate  - Keep care items and personal belongings within reach  - Initiate and maintain comfort rounds  - Make Fall Risk Sign visible to staff  -  Offer Toileting every 2 Hours, in advance of need  - Initiate/Maintain bed alarm  - Obtain necessary fall risk management equipment: bed alarm, call bell,  socks  - Apply yellow socks and bracelet for high fall risk patients  - Consider moving patient to room near nurses station  Outcome: Progressing

## 2024-04-02 NOTE — ASSESSMENT & PLAN NOTE
Lab Results   Component Value Date    HGBA1C 5.8 (H) 03/07/2024     Well controlled on metformin. No longer taking insulin.   Hold metformin while admitted. Start sliding scale. Resume metformin on discharge

## 2024-04-02 NOTE — PLAN OF CARE
Problem: PHYSICAL THERAPY ADULT  Goal: Performs mobility at highest level of function for planned discharge setting.  See evaluation for individualized goals.  Description: Treatment/Interventions: Functional transfer training, LE strengthening/ROM, Elevations, Therapeutic exercise, Endurance training, Patient/family training, Bed mobility, Gait training, Spoke to nursing, OT, Spoke to case management  Equipment Recommended: Walker (pt has)       See flowsheet documentation for full assessment, interventions and recommendations.  Note: Prognosis: Fair  Problem List: Decreased strength, Decreased range of motion, Decreased endurance, Impaired balance, Decreased mobility, Pain  Assessment: Pt. 58 y.o.female s/p R TKR 2* to Primary osteoarthritis of right knee. Pt referred to PT for mobility assessment & D/C planning w/ orders of  activity POD#0 & WBAT RLE . Please see above for information re: home set-up & PLOF as well as objective findings during PT assessment. PTA, pt reports being  fairly I w/o AD however admits to holding on to furniture for support at baseline . On eval, pt functioning below baseline hence will continue skilled PT to improve function & safety. Pt require minAx2 for bed mobility, transfers & amb trial w/ RW + cues for techniques & safety. Gait deviations as above but no gross LOB noted. Dec amb tolerance 2* to pain & dec alertness. Pt require inc time, stimulation to keep pt awake & encouragement to complete overall tasks. Pt tolerated further mobility training after IE, please see additional tx below for details.  The patient's AM-PAC Basic Mobility Inpatient Short Form Raw Score is 11. A Raw score of less than or equal to 16 suggests the patient may benefit from discharge to post-acute rehabilitation services. Please also refer to the recommendation of the Physical Therapist for safe discharge planning. From PT standpoint, will recommend Level II (moderate resource intensity) rehab services  at D/C. No SOB & dizziness reported t/o session. BP during session as follows: 147/90 supine; 165/95 EOB. Nsg staff most recent vital signs as follows: /87 (BP Location: Left arm)   Pulse 86   Temp (!) 97.1 °F (36.2 °C) (Temporal)   Resp 18   Wt 77.9 kg (171 lb 11.8 oz)   SpO2 95%   BMI 30.42 kg/m² . At end of session, pt unsafe to sit OOB in high back chair 2* to dec alertness hence assisted back in bed in stable condition, call bell & phone in reach, bed alarm activated. Fall precautions reinforced w/ good understanding. CM to follow. Nsg staff to continue to mobilized pt (OOB in chair for all meals & ambulate in room/unit) as tolerated to prevent further decline in function. Will also recommend [facility] for daily amb &/or daily OOB in chair as appropriate. Nsg notified. Co-eval was necessary to complete this PT eval for the pt's best interest given pt's medical acuity & complexity.    Barriers to Discharge: Inaccessible home environment, Other (Comment)  Barriers to Discharge Comments: (+) stairs; post-op pain    Rehab Resource Intensity Level, PT: II (Moderate Resource Intensity) (pending progress)    See flowsheet documentation for full assessment.

## 2024-04-02 NOTE — ASSESSMENT & PLAN NOTE
Patient underwent elective right knee arthoplasty, currently admitted under orthopedic surgyer.  DVT PPX with ASA 81 mg BID as per ortho.  Pain control.  PT/OT

## 2024-04-02 NOTE — PROGRESS NOTES
ADT alert received. Patient was admitted to [facility] for elective TKR of the right knee due to arthritis.She was admitted for pain control  I will follow up after discharge.

## 2024-04-02 NOTE — PHYSICAL THERAPY NOTE
Physical Therapy Treatment Note     04/02/24 1337   PT Last Visit   PT Visit Date 04/02/24   Note Type   Note Type Treatment   Pain Assessment   Pain Assessment Tool 0-10   Pain Score 9   Pain Location/Orientation Orientation: Right;Location: Knee   Restrictions/Precautions   Weight Bearing Precautions Per Order Yes   RLE Weight Bearing Per Order WBAT   Other Precautions Chair Alarm;Fall Risk;Pain;Bed Alarm   General   Chart Reviewed Yes   Family/Caregiver Present No   Subjective   Subjective Pt. agreeable to PT   Bed Mobility   Supine to Sit   (CGA)   Additional items Assist x 1;Increased time required;Bedrails;HOB elevated;Verbal cues   Sit to Supine 4  Minimal assistance   Additional items Assist x 1;LE management;Increased time required;Bedrails;HOB elevated;Verbal cues   Transfers   Sit to Stand   (CGA)   Additional items Assist x 1;Armrests;Increased time required;Verbal cues   Stand to Sit 5  Supervision   Additional items Assist x 1;Increased time required;Verbal cues;Armrests   Stand pivot 5  Supervision   Additional items Assist x 1;Increased time required;Verbal cues   Toilet transfer   (CGA)   Additional items Verbal cues;Commode;Armrests;Assist x 1   Ambulation/Elevation   Gait pattern Improper Weight shift;Decreased R stance;Decreased foot clearance;Antalgic;Forward Flexion;Narrow DORIS;Inconsistent tenzin;Short stride;Excessively slow;Decreased toe off;Decreased heel strike   Gait Assistance 4  Minimal assist   Additional items Assist x 1;Verbal cues   Assistive Device Rolling walker   Distance 2ft to BSC, 20ft x 2   Balance   Static Sitting Good   Dynamic Sitting Fair +   Static Standing Fair   Dynamic Standing Fair -   Ambulatory Fair -   Endurance Deficit   Endurance Deficit Yes   Endurance Deficit Description pain, dec. alertness   Activity Tolerance   Activity Tolerance Patient tolerated treatment well   Nurse Made Aware Yes   Assessment   Prognosis Fair   Problem List Decreased range of  motion;Decreased strength;Impaired balance;Decreased endurance;Decreased mobility;Decreased cognition;Impaired judgement;Pain;Orthopedic restrictions   Assessment Pt. noted with improved mobility this session. pt. able to perform perihygiene post urination on BSC. Pt. needed Min/CGA for all activities. Cues to stay awake and to keep her eys open during mobility. Pt. reported she has done it sicne she was kid. Pt. needed encouragement to particiaopte sarah ctivities. Seated rest between ambulation. Pt. was back in bed posts ession and bed alarm engaged and all needs withinr each. pt. was given ice packs at the end of session for R knee.   Barriers to Discharge None   Goals   Patient Goals none reported   STG Expiration Date 04/09/24   PT Treatment Day 2   Plan   Treatment/Interventions Functional transfer training;Spoke to nursing;Gait training;Bed mobility;Equipment eval/education;Patient/family training   Progress Progressing toward goals   PT Frequency 5-7x/wk;Twice a day   Discharge Recommendation   Rehab Resource Intensity Level, PT II (Moderate Resource Intensity)   Equipment Recommended Walker   AM-PAC Basic Mobility Inpatient   Turning in Flat Bed Without Bedrails 3   Lying on Back to Sitting on Edge of Flat Bed Without Bedrails 3   Moving Bed to Chair 3   Standing Up From Chair Using Arms 3   Walk in Room 3   Climb 3-5 Stairs With Railing 3   Basic Mobility Inpatient Raw Score 18   Basic Mobility Standardized Score 41.05   [facility] Highest Level Of Mobility   -HLM Goal 6: Walk 10 steps or more   -HLM Achieved 7: Walk 25 feet or more           Isaiah Dia PTA    An AM-PAC basic mobility standardized score less than 42.9 suggest the patient may benefit from discharge to post-acute rehab services.

## 2024-04-02 NOTE — PHYSICAL THERAPY NOTE
PT EVALUATION & TREATMENT    Pt. Name: Bhavana Smith  Pt. Age: 58 y.o.  MRN: [id number]  LENGTH OF STAY: 0      Admitting Diagnoses:   Primary osteoarthritis of right knee [M17.11]  Chronic pain of right knee [M25.561, G89.29]    Past Medical History:   Diagnosis Date    Addiction to drug (HCC)     Adjustment disorder     Alcohol abuse     Alcoholism (HCC)     Anxiety     Bipolar disorder (HCC)     Bowel obstruction (HCC)     Depression     Diabetes mellitus (HCC)     Diabetes type 2, controlled (HCC)     Disease of thyroid gland     Gastritis     Head injury     Heart palpitations     Hyperlipidemia     Hypertension     Hypothyroidism     Psychiatric illness     PTSD (post-traumatic stress disorder)     Seizure (HCC)     Seizures (HCC)     Sleep difficulties     Substance abuse (HCC)     Suicide attempt (HCC)     Withdrawal symptoms, drug or narcotic (HCC)        Past Surgical History:   Procedure Laterality Date    ABDOMINAL SURGERY      APPENDECTOMY      BOWEL RESECTION      CHOLECYSTECTOMY      ESOPHAGOGASTRODUODENOSCOPY N/A 05/18/2018    Procedure: ESOPHAGOGASTRODUODENOSCOPY (EGD) with bx;  Surgeon: Lulu West DO;  Location: [facility];  Service: Gastroenterology    HYSTERECTOMY      KNEE SURGERY Bilateral 1991       Imaging Studies:  RADIOLOGY RESULTS   Final Result by  (04/02 1319)      XR knee right 1 or 2 views    (Results Pending)         04/02/24 1007   PT Last Visit   PT Visit Date 04/02/24   Note Type   Note type Evaluation  (& treatment)   Pain Assessment   Pain Score 10 - Worst Possible Pain   Pain Location/Orientation Orientation: Right;Location: Knee   Hospital Pain Intervention(s) Medication (See MAR);Cold applied;Repositioned;Ambulation/increased activity;Emotional support;Rest   Restrictions/Precautions   Weight Bearing Precautions Per Order Yes   RLE Weight Bearing Per Order WBAT   Other Precautions Chair Alarm;Bed Alarm;Fall Risk;Pain   Home Living   Type of Home House   Home  Layout Two level;Bed/bath upstairs;Other (Comment)  (0STE; FOS to 2nd floor bed/bath; pt plans to stay on the 2nd floor)   Bathroom Shower/Tub Tub/shower unit   Bathroom Toilet Standard   Home Equipment Walker   Additional Comments Somewhat difficult to gather above information as pt groggy during session. Will clarify next tx session as needed.   Prior Function   Level of [place] Independent with functional mobility;Independent with ADLs  (w/o AD but admits to holding on furniture for support)   Lives With Significant other   Receives Help From Family   Falls in the last 6 months 0   Comments Somewhat difficult to gather above information as pt groggy during session. Will clarify next tx session as needed.   General   Family/Caregiver Present No   Cognition   Overall Cognitive Status WFL   Arousal/Participation Arousable   Orientation Level Oriented to person;Oriented to place   Following Commands Follows one step commands with increased time or repetition   Comments groggy but easily arousable but also easily falls back to sleep   Subjective   Subjective Pt agreeable to PT/OT evals.   RUE Assessment   RUE Assessment   (refer to OT)   LUE Assessment   LUE Assessment   (refer to OT)   RLE Assessment   RLE Assessment X  (not tested 2* to pain except ankle 3+/5)   LLE Assessment   LLE Assessment WFL  (4/5 grossly)   Coordination   Movements are Fluid and Coordinated 1   Sensation WFL   Bed Mobility   Supine to Sit 4  Minimal assistance   Additional items Assist x 2;HOB elevated;Bedrails;Increased time required;Verbal cues;LE management   Additional Comments cues for techniques & safety; require inc time to complete task; require assistance for RLE management   Transfers   Sit to Stand 4  Minimal assistance   Additional items Assist x 2;Increased time required;Verbal cues;Bedrails   Stand to Sit 4  Minimal assistance   Additional items Assist x 2;Increased time required;Verbal cues   Additional Comments cues for  techniques & safety; require assistance for RLE positioning upon sitting   Ambulation/Elevation   Gait pattern Antalgic;Wide DORIS;Decreased foot clearance;Improper Weight shift;Decreased R stance;Short stride;Step to;Excessively slow   Gait Assistance 4  Minimal assist   Additional items Assist x 2;Verbal cues;Tactile cues   Assistive Device Rolling walker   Distance 3'x1 bed to BSC   Ambulation/Elevation Additional Comments unsteady gait but no gross LOB noted; dec amb tolerance 2* to severe R knee pain   Balance   Static Sitting Fair   Dynamic Sitting Fair -   Static Standing Poor +  (w/ RW)   Dynamic Standing Poor  (w/ RW)   Ambulatory Poor  (w/ RW)   Endurance Deficit   Endurance Deficit Yes   Endurance Deficit Description pain; dec alertness   Activity Tolerance   Activity Tolerance Patient limited by fatigue;Patient limited by pain;Treatment limited secondary to medical complications (Comment)   Medical Staff Made Aware MICHELE Sommer   Nurse Made Aware yes, Cheryl   Assessment   Prognosis Fair   Problem List Decreased strength;Decreased range of motion;Decreased endurance;Impaired balance;Decreased mobility;Pain   Assessment Pt. 58 y.o.female s/p R TKR 2* to Primary osteoarthritis of right knee. Pt referred to PT for mobility assessment & D/C planning w/ orders of  activity POD#0 & WBAT RLE . Please see above for information re: home set-up & PLOF as well as objective findings during PT assessment. PTA, pt reports being  fairly I w/o AD however admits to holding on to furniture for support at baseline . On eval, pt functioning below baseline hence will continue skilled PT to improve function & safety. Pt require minAx2 for bed mobility, transfers & amb trial w/ RW + cues for techniques & safety. Gait deviations as above but no gross LOB noted. Dec amb tolerance 2* to pain & dec alertness. Pt require inc time, stimulation to keep pt awake & encouragement to complete overall tasks. Pt tolerated further  mobility training after IE, please see additional tx below for details.  The patient's AM-PAC Basic Mobility Inpatient Short Form Raw Score is 11. A Raw score of less than or equal to 16 suggests the patient may benefit from discharge to post-acute rehabilitation services. Please also refer to the recommendation of the Physical Therapist for safe discharge planning. From PT standpoint, will recommend Level II (moderate resource intensity) rehab services at D/C. No SOB & dizziness reported t/o session. BP during session as follows: 147/90 supine; 165/95 EOB. Nsg staff most recent vital signs as follows: /87 (BP Location: Left arm)   Pulse 86   Temp (!) 97.1 °F (36.2 °C) (Temporal)   Resp 18   Wt 77.9 kg (171 lb 11.8 oz)   SpO2 95%   BMI 30.42 kg/m² . At end of session, pt unsafe to sit OOB in high back chair 2* to dec alertness hence assisted back in bed in stable condition, call bell & phone in reach, bed alarm activated. Fall precautions reinforced w/ good understanding. CM to follow. Nsg staff to continue to mobilized pt (OOB in chair for all meals & ambulate in room/unit) as tolerated to prevent further decline in function. Will also recommend Restorative for daily amb &/or daily OOB in chair as appropriate. Nsg notified. Co-eval was necessary to complete this PT eval for the pt's best interest given pt's medical acuity & complexity.   Barriers to Discharge Inaccessible home environment;Other (Comment)   Barriers to Discharge Comments (+) stairs; post-op pain   Goals   Patient Goals to have less pain   STG Expiration Date 04/09/24   Short Term Goal #1 Goals to be met in 7 days; pt will be able to: 1) inc strength & balance by 1/2 grade to improve overall functional mobility & dec fall risk; 2) inc bed mobility to modified I for pt to be able to get in/OOB safely w/ proper techniques 100% of the time, to dec caregiver burden & safely function at home; 3) inc transfers to modified I for pt to transition  safely from one surface to another w/o % of the time, to dec caregiver burden & safely function at home; 4) inc amb w/ RW approx. >150' w/ S for pt to ambulate household distances w/o any % of the time, to dec caregiver burden & safely function at home; 5) negotiate stairs w/ S for inc safety during stair mgt inside/outside of home & dec caregiver burden; 6) pt/caregiver ed   PT Treatment Day 1   Plan   Treatment/Interventions Functional transfer training;LE strengthening/ROM;Elevations;Therapeutic exercise;Endurance training;Patient/family training;Bed mobility;Gait training;Spoke to nursing;OT;Spoke to case management   PT Frequency 5-7x/wk;Twice a day  (QD-BID)   Discharge Recommendation   Rehab Resource Intensity Level, PT II (Moderate Resource Intensity)  (pending progress)   Equipment Recommended Walker  (pt has)   Additional Comments restorative for daily OOB in chair as appropriate   AM-PAC Basic Mobility Inpatient   Turning in Flat Bed Without Bedrails 2   Lying on Back to Sitting on Edge of Flat Bed Without Bedrails 2   Moving Bed to Chair 2   Standing Up From Chair Using Arms 2   Walk in Room 2   Climb 3-5 Stairs With Railing 1   Basic Mobility Inpatient Raw Score 11   Basic Mobility Standardized Score 30.25   [facility] Highest Level Of Mobility   -[facility] Goal 4: Move to chair/commode   -[facility] Achieved 4: Move to chair/commode   Additional Treatment Session   Start Time 0955   End Time 1007   Treatment Assessment After IE, pt tolerated further mobility training. Pt continue to require minAx2 for transfers & amb w/ RW + cues for techniques & safety especially hand placement & RLE positioning. Pt continue to require assistance for RLE positioning upon sitting. Gait still slow & unsteady but no gross LOB noted. Pt continue to demonstrate dec amb tolerance 2* to pain. Pt assisted back in bed at end of session as pt unsafe to sit OOB in high back chair 2* to dec alertness. Recliner not  available at this time. Pt in stable condition. All needs in reach. Bed alarm activated. Discussed PT POC & D/C rec w/ good understanding. Pt open to STR if needed. Will continue PT per POC.   Equipment Use RW   Additional Treatment Day 1   End of Consult   Patient Position at End of Consult Supine;Bed/Chair alarm activated;All needs within reach   End of Consult Comments Pt in stable condition. All needs in reach. Bed alarm activated.   Hx/personal factors: co-morbidities, inaccessible home, use of AD, dec cognition, pain, fall risk, and assist w/ ADL's  Examination: dec mobility, dec balance, dec endurance, dec amb, risk for falls, pain, dec cognition  Clinical: unpredictable (ongoing medical status, abnormal lab values, risk for falls, and pain mgt)  Complexity: high    Parviz Basurto

## 2024-04-02 NOTE — PROGRESS NOTES
Progress Note - Orthopedics   Bhavana Smith 58 y.o. female MRN: [id number]  Unit/Bed#: E2 -01      Subjective:    58 y.o.female POD 1 right TKA.  No new acute overnight events.  Patient appears drowsy upon examination today, but she is arousable with verbal stimuli.  Patient had been reporting that her ace bandage was too tight today, but now she says it is fine and prefers it to stay on.  Pain is a 9/10 today.  She denies subjective CP, SOB, N/V, or numbness or tingling.     Labs:  0   Lab Value Date/Time    HCT 28.3 (L) 04/02/2024 0830    HCT 36.9 03/07/2024 1523    HCT 39.3 11/14/2023 0813    HCT 32.3 (L) 01/03/2016 0449    HCT 32.2 (L) 01/02/2016 0550    HCT 35.6 01/01/2016 0420    HGB 9.6 (L) 04/02/2024 0830    HGB 11.8 03/07/2024 1523    HGB 12.8 11/14/2023 0813    HGB 10.7 (L) 01/03/2016 0449    HGB 10.9 (L) 01/02/2016 0550    HGB 12.1 01/01/2016 0420    PT 11.1 (L) 06/22/2018 2009    INR 0.94 03/07/2024 1523    INR 0.8 06/22/2018 2009    WBC 10.80 (H) 04/02/2024 0830    WBC 10.12 03/07/2024 1523    WBC 10.59 (H) 11/14/2023 0813    WBC 6.98 01/03/2016 0449    WBC 11.66 (H) 01/02/2016 0550    WBC 22.38 (H) 01/01/2016 0420       Meds:    Current Facility-Administered Medications:     acetaminophen (TYLENOL) tablet 650 mg, 650 mg, Oral, Q4H PRN, Rosemarie Bianchi PA-C    acetaminophen (TYLENOL) tablet 975 mg, 975 mg, Oral, Q8H, Rosemarie Bianchi PA-C, 975 mg at 04/02/24 1357    ARIPiprazole (ABILIFY) tablet 10 mg, 10 mg, Oral, Daily, Rosemarie Bianchi PA-C, 10 mg at 04/02/24 0842    ascorbic acid (VITAMIN C) tablet 500 mg, 500 mg, Oral, BID, Rosemarie Bianchi PA-C, 500 mg at 04/02/24 0842    aspirin (ECOTRIN LOW STRENGTH) EC tablet 81 mg, 81 mg, Oral, BID, Rosemarie Bianchi PA-C, 81 mg at 04/02/24 0841    atorvastatin (LIPITOR) tablet 20 mg, 20 mg, Oral, Daily With Dinner, Rosemarie Bianchi PA-C, 20 mg at 04/01/24 1659    calcium carbonate (TUMS) chewable tablet 1,000 mg, 1,000 mg, Oral, Daily PRN, Rosemarie Kessler  ARACELIS Bianchi    clonazePAM (KlonoPIN) tablet 0.5 mg, 0.5 mg, Oral, Daily PRN, Rosemarie Bianchi PA-C, 0.5 mg at 04/01/24 1740    docusate sodium (COLACE) capsule 100 mg, 100 mg, Oral, BID, Rosemarie Bianchi PA-C, 100 mg at 04/02/24 0842    ferrous sulfate tablet 325 mg, 325 mg, Oral, Daily With Breakfast, Rosemarie Bianchi PA-C, 325 mg at 04/02/24 0719    folic acid (FOLVITE) tablet 1 mg, 1 mg, Oral, Daily, Rosemarie Bianchi PA-C, 1 mg at 04/02/24 0842    gabapentin (NEURONTIN) capsule 800 mg, 800 mg, Oral, TID, Rosemarie Bianchi PA-C, 800 mg at 04/02/24 0841    hydroCHLOROthiazide tablet 12.5 mg, 12.5 mg, Oral, Daily, Rosemarie Bianchi PA-C, 12.5 mg at 04/02/24 0841    insulin lispro (HumALOG/ADMELOG) 100 units/mL subcutaneous injection 1-6 Units, 1-6 Units, Subcutaneous, TID AC **AND** Fingerstick Glucose (POCT), , , TID AC, Alta Mcghee MD    lactated ringers infusion, 100 mL/hr, Intravenous, Continuous, Rosemarie Bianchi PA-C, Stopped at 04/02/24 0634    levothyroxine tablet 25 mcg, 25 mcg, Oral, Early Morning, Rosemarie Bianchi PA-C, 25 mcg at 04/02/24 0548    lubiprostone (AMITIZA) capsule 8 mcg, 8 mcg, Oral, BID, Rosemarie Bianchi PA-C, 8 mcg at 04/02/24 0840    metoprolol tartrate (LOPRESSOR) tablet 50 mg, 50 mg, Oral, Q12H, Rosemarie Bianchi PA-C, 50 mg at 04/02/24 0348    morphine injection 2 mg, 2 mg, Intravenous, Q2H PRN, Rosemarie Bianchi PA-C, 2 mg at 04/02/24 0348    multivitamin-minerals (CENTRUM) tablet 1 tablet, 1 tablet, Oral, Daily, Rosemarie Bianchi PA-C, 1 tablet at 04/02/24 0841    nicotine (NICODERM CQ) 21 mg/24 hr TD 24 hr patch 21 mg, 21 mg, Transdermal, Daily, Alta Mcghee MD    ondansetron (ZOFRAN) injection 4 mg, 4 mg, Intravenous, Q6H PRN, Rosemarie Bianchi PA-C    oxyCODONE (ROXICODONE) immediate release tablet 10 mg, 10 mg, Oral, Q4H PRN, Rosemarie Bianchi PA-C, 10 mg at 04/02/24 0719    oxyCODONE (ROXICODONE) IR tablet 5 mg, 5 mg, Oral, Q4H PRN, Rosemarie Bianchi PA-C, 5 mg at 04/01/24 7403     "pantoprazole (PROTONIX) EC tablet 40 mg, 40 mg, Oral, Daily, Rosemarie Bianchi PA-C, 40 mg at 04/02/24 0842    senna (SENOKOT) tablet 8.6 mg, 1 tablet, Oral, Daily, Rosemaire Bianchi PA-C, 8.6 mg at 04/02/24 0841    simethicone (MYLICON) chewable tablet 80 mg, 80 mg, Oral, 4x Daily PRN, Rosemarie Bianchi PA-C    SUMAtriptan (IMITREX) tablet 25 mg, 25 mg, Oral, Once PRN, Rosemarie Bianchi PA-C    traZODone (DESYREL) tablet 200 mg, 200 mg, Oral, HS, Rosemarie Bianchi PA-C, 200 mg at 04/01/24 2107    Blood Culture:   Lab Results   Component Value Date    BLOODCX No Growth After 5 Days. 11/14/2023       Wound Culture:   No results found for: \"WOUNDCULT\"    Ins and Outs:  I/O last 24 hours:  In: 2603.3 [P.O.:300; I.V.:2303.3]  Out: 775 [Urine:675; Blood:100]          Physical:  Vitals:    04/02/24 1132   BP: 168/87   Pulse: 86   Resp: 18   Temp: (!) 97.1 °F (36.2 °C)   SpO2: 95%     Musculoskeletal: right Lower Extremity  Visible skin is without erythema or ecchymosis.  Dressing C/D/I  TTP over the knee  Sensation intact over the toes  Motor intact to +FHL/EHL, +ankle dorsi/plantar flexion  Digits warm and well perfused  Capillary refill < 2 seconds    Assessment:    58 y.o.female POD 1 right TKA with Dr. Vincent on 4/1/24.     Plan:  WBAT RLE  I offered to we-wrap the patient's ace bandage to make it looser or to remove it altogether, but the patient asked me to leave it in pace.  Will monitor for ABLA and administer IVF/prbc as indicated for greater than 2 gram Hgb drop or Hgb < 7.  Hgb this morning is 9.6.  No signs of active bleeding in the operative extremity.  PT/OT  Pain control  DVT ppx with aspirin 81 mg BID for 4 weeks.  Dispo: Ok for discharge from ortho perspective when pain controlled and when cleared by PT/OT.    Halley Agee PA-C    "

## 2024-04-02 NOTE — PLAN OF CARE
Problem: PHYSICAL THERAPY ADULT  Goal: Performs mobility at highest level of function for planned discharge setting.  See evaluation for individualized goals.  Description: Treatment/Interventions: Functional transfer training, LE strengthening/ROM, Elevations, Therapeutic exercise, Endurance training, Patient/family training, Bed mobility, Gait training, Spoke to nursing, OT, Spoke to case management  Equipment Recommended: Walker (pt has)       See flowsheet documentation for full assessment, interventions and recommendations.  Outcome: Progressing  Note: Prognosis: Fair  Problem List: Decreased range of motion, Decreased strength, Impaired balance, Decreased endurance, Decreased mobility, Decreased cognition, Impaired judgement, Pain, Orthopedic restrictions  Assessment: Pt. noted with improved mobility this session. pt. able to perform perihygiene post urination on BSC. Pt. needed Min/CGA for all activities. Cues to stay awake and to keep her eys open during mobility. Pt. reported she has done it sicne she was kid. Pt. needed encouragement to particiaopte sarah ctivities. Seated rest between ambulation. Pt. was back in bed posts ession and bed alarm engaged and all needs withinr each. pt. was given ice packs at the end of session for R knee.  Barriers to Discharge: None  Barriers to Discharge Comments: (+) stairs; post-op pain  Rehab Resource Intensity Level, PT: II (Moderate Resource Intensity)    See flowsheet documentation for full assessment.

## 2024-04-02 NOTE — OCCUPATIONAL THERAPY NOTE
Occupational Therapy Evaluation     Patient Name: Bhavana Smith  Today's Date: 4/2/2024  Problem List  Principal Problem:    Primary osteoarthritis of right knee  Active Problems:    Essential hypertension    Bipolar 1 disorder, depressed (HCC)    Acquired hypothyroidism    Tobacco abuse    Type 2 diabetes mellitus with obesity  (HCC)    Diabetic gastroparesis  (HCC)    Anemia    Past Medical History  Past Medical History:   Diagnosis Date    Addiction to drug (HCC)     Adjustment disorder     Alcohol abuse     Alcoholism (HCC)     Anxiety     Bipolar disorder (HCC)     Bowel obstruction (HCC)     Depression     Diabetes mellitus (HCC)     Diabetes type 2, controlled (HCC)     Disease of thyroid gland     Gastritis     Head injury     Heart palpitations     Hyperlipidemia     Hypertension     Hypothyroidism     Psychiatric illness     PTSD (post-traumatic stress disorder)     Seizure (HCC)     Seizures (HCC)     Sleep difficulties     Substance abuse (HCC)     Suicide attempt (HCC)     Withdrawal symptoms, drug or narcotic (HCC)      Past Surgical History  Past Surgical History:   Procedure Laterality Date    ABDOMINAL SURGERY      APPENDECTOMY      BOWEL RESECTION      CHOLECYSTECTOMY      ESOPHAGOGASTRODUODENOSCOPY N/A 05/18/2018    Procedure: ESOPHAGOGASTRODUODENOSCOPY (EGD) with bx;  Surgeon: Lulu West DO;  Location: [facility];  Service: Gastroenterology    HYSTERECTOMY      KNEE SURGERY Bilateral 1991         04/02/24 0932   OT Last Visit   OT Visit Date 04/02/24   Note Type   Note type Evaluation   Pain Assessment   Pain Assessment Tool 0-10   Pain Score 10 - Worst Possible Pain   Restrictions/Precautions   Weight Bearing Precautions Per Order Yes   RLE Weight Bearing Per Order WBAT   Other Precautions Cognitive;Chair Alarm;Bed Alarm;Fall Risk;Pain   Home Living   Type of Home House   Home Layout Two level;Bed/bath upstairs;Other (Comment)  (0STE, full flight to 2nd floor; pt plans to stay on the  "2nd floor)   Bathroom Shower/Tub Tub/shower unit   Bathroom Toilet Standard   Bathroom Equipment Other (Comment)  (pt denies dme)   Home Equipment Walker   Additional Comments Pt with difficutly staying awake during tx session; difficutly to obtain social information   Prior Function   Level of [place] Independent with ADLs;Independent with functional mobility;Independent with IADLS   Lives With Significant other   Receives Help From Friend(s)   IADLs Family/Friend/Other provides transportation;Independent with medication management;Independent with meal prep   Falls in the last 6 months 0   Lifestyle   Autonomy Pt states PTA she was independent with ADL/IADLs, transfers, and functional mobility - w/o use of AD (reports furniture walking in home). Pt reports furniture walking within her home. Pt states (-) falls, (-) , (-) home alone.   Reciprocal Relationships boyfriend   Service to Others homemaker   Intrinsic Gratification watching tv   Subjective   Subjective \"It feels tight.\"   ADL   Where Assessed Edge of bed   Eating Assistance 5  Supervision/Setup   Grooming Assistance 5  Supervision/Setup   UB Bathing Assistance 4  Minimal Assistance   LB Bathing Assistance 2  Maximal Assistance   UB Dressing Assistance 4  Minimal Assistance   LB Dressing Assistance 2  Maximal Assistance   Toileting Assistance  3  Moderate Assistance   Bed Mobility   Supine to Sit 4  Minimal assistance   Additional items Assist x 2;HOB elevated;Bedrails;Increased time required;Verbal cues;LE management   Transfers   Sit to Stand 4  Minimal assistance   Additional items Assist x 2;Increased time required;Verbal cues;Bedrails   Stand to Sit 4  Minimal assistance   Additional items Assist x 2;Increased time required;Verbal cues   Additional Comments BPs: 147/90 (supine), 165/95 (EOB)   Functional Mobility   Functional Mobility 4  Minimal assistance   Additional Comments x2   Additional items Rolling walker   Balance   Static Sitting " Fair   Dynamic Sitting Fair -   Static Standing Poor +   Dynamic Standing Poor   Activity Tolerance   Activity Tolerance Patient limited by fatigue;Patient limited by pain   Medical Staff Made Aware PT MARK ANTHONY Jj   RUSHREYAS Assessment   RUE Assessment WFL   RUE Strength   RUE Overall Strength Within Functional Limits - able to perform ADL tasks with strength  (4+/5 throughout)   LUE Assessment   LUE Assessment WFL   LUE Strength   LUE Overall Strength Within Functional Limits - able to perform ADL tasks with strength  (4+/5 throughout)   Hand Function   Gross Motor Coordination Functional   Fine Motor Coordination Functional   Sensation   Light Touch No apparent deficits   Proprioception   Proprioception No apparent deficits   Vision-Basic Assessment   Current Vision No visual deficits   Vision - Complex Assessment   Acuity Able to read employee name badge without difficulty   Psychosocial   Psychosocial (WDL) X   Patient Behaviors/Mood Angry;Crying;Cooperative   Perception   Inattention/Neglect Appears intact   Cognition   Overall Cognitive Status WFL   Arousal/Participation Lethargic;Cooperative;Poorly responsive   Attention Attends with cues to redirect   Orientation Level Oriented to person;Oriented to place;Disoriented to place;Disoriented to time;Disoriented to situation   Memory Decreased recall of precautions   Following Commands Follows one step commands with increased time or repetition   Comments Pt delayed in responses 2* to lethargy. Questionable pain meds vs. cognitive deficits   Assessment   Limitation Decreased ADL status;Decreased UE strength;Decreased Safe judgement during ADL;Decreased endurance;Decreased high-level ADLs   Prognosis Fair   Assessment Pt is a 58 y.o female who presented to [facility] on 4/[facility]/2024 for an elective TKR 2* to OA and chronic R knee pain. Per ortho pt is currently WBAT R LE. Pt with PMH of drug addiction, substance abuse, adjustment disorder, bipolar disorder, alcoholism,  depression, DM, head injury, hld, hypothyroidism, withdraw symptoms, and b/l knee surgery (1991). Pt states PTA she was independent with ADL/IADLs, transfers, and functional mobility - w/o use of AD (reports furniture walking in home). Pt reports furniture walking within her home. Pt states (-) falls, (-) , (-) home alone. During OT initial evaluation pt demonstrated deficits with ADL status, transfer safety, b/l UE strength, functional balance, activity tolerance (currently fair = 15-20mins), and functional mobility. Pt would benefit from continued IP OT services at this time focused on the above stated defcitis. 3-5x/1-2weeks. The patient's raw score on the AM-PAC Daily Activity Inpatient Short Form is 16. A raw score of less than 19 suggests the patient may benefit from discharge to post-acute rehabilitation services. Please refer to the recommendation of the Occupational Therapist for safe discharge planning.   Goals   Patient Goals To have less pain   STG Time Frame   (1-7 days)   Short Term Goal #1 Pt will identify and verbalize 3 potential fall risks and the appropriate compensation technique for increased safety awareness.   Short Term Goal #2 Pt will demonstrate bed mobility at supervision level to facilitate completion of simple grooming tasks (i.e brushing teeth/bruhsing hair) at EOB.   Short Term Goal  Pt will improve balance by 1/2 grade to facilitate completion of ADL tasks.   LTG Time Frame   (7-14 days)   Long Term Goal #1 Pt will demonstrate bathing/dressing of UB at supervision and LB at [place] level for improved ADL status.   Long Term Goal #2 Pt will demonstrate transfers (i.e sit-stand/stand-sit) at supervison level to facilitate completion of ADL routine.   Long Term Goal Pt will increase activity tolerance to good (20-30mins) and standing tolerance to 3-5mins for ADL routine.   Plan   Treatment Interventions ADL retraining;Functional transfer training;UE strengthening/ROM;Cognitive  reorientation;Patient/family training;Equipment evaluation/education;Compensatory technique education;Continued evaluation   Goal Expiration Date 04/16/24   OT Treatment Day 0   OT Frequency 3-5x/wk   Discharge Recommendation   Rehab Resource Intensity Level, OT II (Moderate Resource Intensity)   AM-PAC Daily Activity Inpatient   Lower Body Dressing 2   Bathing 2   Toileting 2   Upper Body Dressing 3   Grooming 3   Eating 4   Daily Activity Raw Score 16   Daily Activity Standardized Score (Calc for Raw Score >=11) 35.96   AM-PAC Applied Cognition Inpatient   Following a Speech/Presentation 3   Understanding Ordinary Conversation 3   Taking Medications 3   Remembering Where Things Are Placed or Put Away 2   Remembering List of 4-5 Errands 2   Taking Care of Complicated Tasks 2   Applied Cognition Raw Score 15   Applied Cognition Standardized Score 33.54   End of Consult   Patient Position at End of Consult Supine;Bed/Chair alarm activated;All needs within reach   Ros Simental

## 2024-04-02 NOTE — CONSULTS
[facility]  Consult  Name: Bhavana Smith 58 y.o. female I MRN: [id number]  Unit/Bed#: E2 -01 I Date of Admission: 4/1/2024   Date of Service: 4/2/2024 I Hospital Day: 0    Inpatient consult to Internal Medicine  Consult performed by: Alta Mcghee MD  Consult ordered by: Rosemarie Bianchi PA-C          Assessment/Plan   * Primary osteoarthritis of right knee  Assessment & Plan  Patient underwent elective right knee arthoplasty, currently admitted under orthopedic surgyer.  DVT PPX with ASA 81 mg BID as per ortho.  Pain control.  PT/OT    Anemia  Assessment & Plan  Noted hgb declined to 9.6 today, baseline 11-12, likely post-operative blood loss.  Monitor.     Diabetic gastroparesis  (HCC)  Assessment & Plan    Lab Results   Component Value Date    HGBA1C 5.8 (H) 03/07/2024   Continue protonix    Type 2 diabetes mellitus with obesity  (HCC)  Assessment & Plan    Lab Results   Component Value Date    HGBA1C 5.8 (H) 03/07/2024     Well controlled on metformin. No longer taking insulin.   Hold metformin while admitted. Start sliding scale. Resume metformin on discharge    Tobacco abuse  Assessment & Plan  Counseling provided for cessation.  Nicotine replacement patch    Acquired hypothyroidism  Assessment & Plan  Continue home levothyroxine    Bipolar 1 disorder, depressed (HCC)  Assessment & Plan  Continue home meds abilify and trazodone    Essential hypertension  Assessment & Plan  Continue home meds HCTZ, metoprolol             VTE Prophylaxis: aspirin 81 BID as per ortho  sequential compression device     Recommendations for Discharge:  As per ortho    Counseling / Coordination of Care Time: 45 minutes.  Greater than 50% of total time spent on patient counseling and coordination of care.    Collaboration of Care: Were Recommendations Directly Discussed with Primary Treatment Team? - Yes     History of Present Illness:    Bhavana Smith is a 58 y.o. female who is originally admitted  to the orthopedic service due to elective knee arthroplasty. We are consulted for medical management.  She denies any chest pain, dyspnea, nausea, vomiting, abdominal pain.  Currently knee pain is well-controlled under current pain regimen.     Review of Systems:    Review of Systems   Constitutional:  Negative for chills and fever.   HENT:  Negative for ear pain and sore throat.    Eyes:  Negative for pain and visual disturbance.   Respiratory:  Negative for cough and shortness of breath.    Cardiovascular:  Negative for chest pain and palpitations.   Gastrointestinal:  Negative for abdominal pain and vomiting.   Genitourinary:  Negative for dysuria and hematuria.   Musculoskeletal:  Positive for arthralgias. Negative for back pain.   Skin:  Negative for color change and rash.   Neurological:  Negative for seizures and syncope.   All other systems reviewed and are negative.      Past Medical and Surgical History:     Past Medical History:   Diagnosis Date    Addiction to drug (HCC)     Adjustment disorder     Alcohol abuse     Alcoholism (HCC)     Anxiety     Bipolar disorder (HCC)     Bowel obstruction (HCC)     Depression     Diabetes mellitus (HCC)     Diabetes type 2, controlled (HCC)     Disease of thyroid gland     Gastritis     Head injury     Heart palpitations     Hyperlipidemia     Hypertension     Hypothyroidism     Psychiatric illness     PTSD (post-traumatic stress disorder)     Seizure (HCC)     Seizures (HCC)     Sleep difficulties     Substance abuse (HCC)     Suicide attempt (HCC)     Withdrawal symptoms, drug or narcotic (HCC)        Past Surgical History:   Procedure Laterality Date    ABDOMINAL SURGERY      APPENDECTOMY      BOWEL RESECTION      CHOLECYSTECTOMY      ESOPHAGOGASTRODUODENOSCOPY N/A 05/18/2018    Procedure: ESOPHAGOGASTRODUODENOSCOPY (EGD) with bx;  Surgeon: Lulu West DO;  Location: [facility];  Service: Gastroenterology    HYSTERECTOMY      KNEE SURGERY Bilateral 1991        Meds/Allergies:    PTA meds:   Prior to Admission Medications   Prescriptions Last Dose Informant Patient Reported? Taking?   ARIPiprazole (ABILIFY) 10 mg tablet  Self No Yes   Sig: Take 1 tablet (10 mg total) by mouth daily   Alcohol Swabs 70 % PADS  Self No No   Sig: May substitute brand based on insurance coverage. Check glucose TID.   Blood Glucose Monitoring Suppl (OneTouch Verio Reflect) w/Device KIT  Self No No   Sig: May substitute brand based on insurance coverage. Check glucose TID.   Diclofenac Sodium (VOLTAREN) 1 %   No Yes   Sig: Apply 2 g topically 4 (four) times a day   Microlet Lancets MISC  Self Yes No   Multiple Vitamins-Minerals (multivitamin with minerals) tablet 3/31/2024  No Yes   Sig: Take 1 tablet by mouth daily   OneTouch Delica Lancets 33G MISC  Self No No   Sig: May substitute brand based on insurance coverage. Check glucose TID.   SUMAtriptan (IMITREX) 25 mg tablet 3/31/2024  No Yes   Sig: TAKE 1 TABLET(25 MG) BY MOUTH DAILY AS NEEDED FOR MIGRAINE. DO NOT TAKE MORE THAN 3 DOSES IN 1 WEEK   aluminum-magnesium hydroxide-simethicone (MAALOX) 0575-7499-471 mg/30 mL suspension  Self No Yes   Sig: Take 30 mL by mouth every 4 (four) hours as needed for indigestion or heartburn (gas)   ascorbic acid (VITAMIN C) 500 MG tablet 3/31/2024  No Yes   Sig: Take 1 tablet (500 mg total) by mouth 2 (two) times a day   aspirin 81 mg chewable tablet  Self No No   Sig: Chew 1 tablet (81 mg total) daily Do not start before September 7, 2023.   atorvastatin (LIPITOR) 20 mg tablet 3/31/2024 at 0800 Self No Yes   Sig: Take 1 tablet (20 mg total) by mouth daily   cholecalciferol (VITAMIN D3) 1,000 units tablet   No No   Sig: Take 2 tablets (2,000 Units total) by mouth daily   clonazePAM (KlonoPIN) 0.5 mg tablet 4/1/2024 at 0400  No Yes   Sig: Take 1 tablet (0.5 mg total) by mouth daily as needed for seizures   dicyclomine (BENTYL) 10 mg capsule  Self No No   Sig: Take 2 capsules (20 mg total) by mouth 3  (three) times a day as needed (Cramping)   Patient not taking: Reported on 3/20/2024   docusate sodium (COLACE) 100 mg capsule  Self No No   Sig: Take 1 capsule (100 mg total) by mouth 2 (two) times a day for 14 days   docusate sodium (COLACE) 100 mg capsule  Self No No   Sig: Take 1 capsule (100 mg total) by mouth 2 (two) times a day   Patient not taking: Reported on 12/12/2023   ergocalciferol (VITAMIN D2) 50,000 units Past Week  No Yes   Sig: TAKE 1 CAPSULE BY MOUTH ONCE  WEEKLY   escitalopram (LEXAPRO) 10 mg tablet   No No   Sig: TAKE 1 TABLET(10 MG) BY MOUTH DAILY   Patient not taking: Reported on 3/20/2024   ferrous sulfate 324 (65 Fe) mg 3/31/2024  No Yes   Sig: Take 1 tablet (324 mg total) by mouth 2 (two) times a day before meals   folic acid (FOLVITE) 1 mg tablet 3/31/2024  No Yes   Sig: Take 1 tablet (1 mg total) by mouth daily   gabapentin (NEURONTIN) 800 mg tablet 4/1/2024 at 0400  No Yes   Sig: TAKE 1 TABLET BY MOUTH 3 TIMES  DAILY   hydrochlorothiazide (HYDRODIURIL) 12.5 mg tablet 3/31/2024  No Yes   Sig: TAKE 1 TABLET BY MOUTH DAILY   insulin glargine (LANTUS) 100 units/mL subcutaneous injection  Self No No   Sig: Inject 25 Units under the skin daily at bedtime   Patient not taking: Reported on 3/20/2024   levothyroxine 25 mcg tablet 4/1/2024 at 0400  No Yes   Sig: Take 1 tablet (25 mcg total) by mouth daily in the early morning   linaCLOtide 145 MCG CAPS   No No   Sig: Take 1 capsule (145 mcg total) by mouth in the morning   metFORMIN (GLUCOPHAGE) 1000 MG tablet 3/31/2024 at 1800  No Yes   Sig: TAKE 1 TABLET BY MOUTH TWICE  DAILY WITH MEALS   methylPREDNISolone 4 MG tablet therapy pack   No No   Sig: Use as directed on package   metoprolol tartrate (LOPRESSOR) 50 mg tablet 4/1/2024 at 0400  No Yes   Sig: TAKE 1 TABLET BY MOUTH EVERY 12  HOURS   ondansetron (ZOFRAN) 4 mg tablet  Self No No   Sig: Take 1 tablet (4 mg total) by mouth every 8 (eight) hours as needed for nausea or vomiting   Patient not  taking: Reported on 3/20/2024   pantoprazole (PROTONIX) 40 mg tablet  Self No No   Sig: Take 1 tablet (40 mg total) by mouth 2 (two) times a day   polyethylene glycol (MIRALAX) 17 g packet  Self No No   Sig: Take 17 g by mouth daily   senna (SENOKOT) 8.6 mg  Self No No   Sig: Take 1 tablet (8.6 mg total) by mouth daily Do not start before October 20, 2023.   Patient not taking: Reported on 3/20/2024   sucralfate (CARAFATE) 1 g tablet   No No   Sig: TAKE 1 TABLET BY MOUTH 4 TIMES  DAILY   Patient not taking: Reported on 3/20/2024   traZODone (DESYREL) 100 mg tablet 3/31/2024  No Yes   Sig: TAKE 2 TABLETS(200 MG) BY MOUTH DAILY AT BEDTIME      Facility-Administered Medications: None       Allergies:   Allergies   Allergen Reactions    Latex Itching and Rash    Losartan Cough       Social History:     Marital Status:     Substance Use History:   Social History     Substance and Sexual Activity   Alcohol Use Not Currently    Alcohol/week: 6.0 standard drinks of alcohol    Types: 6 Cans of beer per week    Comment: last drink on 08/15/20     Social History     Tobacco Use   Smoking Status Every Day    Current packs/day: 1.00    Average packs/day: 1 pack/day for 25.0 years (25.0 ttl pk-yrs)    Types: Cigarettes    Passive exposure: Current   Smokeless Tobacco Never   Tobacco Comments    smokes like 5 a day(06/24/2022), smokes 3 cigarettes per day (3/19/24) last smoked 3.31.24     Social History     Substance and Sexual Activity   Drug Use Not Currently       Family History:    Family History   Problem Relation Age of Onset    Bipolar disorder Mother     Cancer Father     Diabetes Father        Physical Exam:     Vitals:   Blood Pressure: 168/93 (04/02/24 0732)  Pulse: 83 (04/02/24 0732)  Temperature: 97.7 °F (36.5 °C) (04/02/24 0732)  Temp Source: Temporal (04/02/24 0732)  Respirations: 18 (04/02/24 0732)  Weight - Scale: 77.9 kg (171 lb 11.8 oz) (04/01/24 0638)  SpO2: 92 % (04/02/24 0732)    Physical Exam  Vitals  and nursing note reviewed.   Constitutional:       General: She is not in acute distress.     Appearance: She is well-developed. She is obese.   HENT:      Head: Normocephalic and atraumatic.   Eyes:      Conjunctiva/sclera: Conjunctivae normal.   Cardiovascular:      Rate and Rhythm: Normal rate and regular rhythm.      Heart sounds: No murmur heard.  Pulmonary:      Effort: Pulmonary effort is normal. No respiratory distress.      Breath sounds: Normal breath sounds.   Abdominal:      Palpations: Abdomen is soft.      Tenderness: There is no abdominal tenderness.   Musculoskeletal:         General: No swelling.      Cervical back: Neck supple.   Skin:     General: Skin is warm and dry.      Capillary Refill: Capillary refill takes less than 2 seconds.   Neurological:      Mental Status: She is alert.   Psychiatric:         Mood and Affect: Mood normal.            Additional Data:     Lab Results: I have personally reviewed pertinent reports.      Results from last 7 days   Lab Units 04/02/24  0830   WBC Thousand/uL 10.80*   HEMOGLOBIN g/dL 9.6*   HEMATOCRIT % 28.3*   PLATELETS Thousands/uL 219     Results from last 7 days   Lab Units 04/02/24  0603   SODIUM mmol/L 134*   POTASSIUM mmol/L 3.7   CHLORIDE mmol/L 99   CO2 mmol/L 29   BUN mg/dL 9   CREATININE mg/dL 0.55*   ANION GAP mmol/L 6   CALCIUM mg/dL 8.6   GLUCOSE RANDOM mg/dL 127             Lab Results   Component Value Date/Time    HGBA1C 5.8 (H) 03/07/2024 03:23 PM    HGBA1C 5.5 09/03/2023 05:38 AM    HGBA1C 5.5 03/17/2023 09:23 PM    HGBA1C 8.6 (H) 07/01/2021 02:40 PM    HGBA1C 5.2 09/01/2018 06:24 AM     Results from last 7 days   Lab Units 04/01/24  0706   POC GLUCOSE mg/dl 111           Imaging: I have personally reviewed pertinent reports.      XR knee right 1 or 2 views    (Results Pending)       EKG, Pathology, and Other Studies Reviewed on Admission:   EKG: reviewed from 3/7/2024    ** Please Note: This note has been constructed using a voice  recognition system. **

## 2024-04-03 ENCOUNTER — HOME HEALTH ADMISSION (OUTPATIENT)
Dept: HOME HEALTH SERVICES | Facility: HOME HEALTHCARE | Age: 59
End: 2024-04-03
Payer: COMMERCIAL

## 2024-04-03 VITALS
OXYGEN SATURATION: 92 % | BODY MASS INDEX: 30.42 KG/M2 | SYSTOLIC BLOOD PRESSURE: 122 MMHG | HEART RATE: 80 BPM | RESPIRATION RATE: 18 BRPM | DIASTOLIC BLOOD PRESSURE: 75 MMHG | TEMPERATURE: 97 F | WEIGHT: 171.74 LBS

## 2024-04-03 LAB
ANION GAP SERPL CALCULATED.3IONS-SCNC: 8 MMOL/L (ref 4–13)
BUN SERPL-MCNC: 5 MG/DL (ref 5–25)
CALCIUM SERPL-MCNC: 8.9 MG/DL (ref 8.4–10.2)
CHLORIDE SERPL-SCNC: 95 MMOL/L (ref 96–108)
CO2 SERPL-SCNC: 30 MMOL/L (ref 21–32)
CREAT SERPL-MCNC: 0.44 MG/DL (ref 0.6–1.3)
ERYTHROCYTE [DISTWIDTH] IN BLOOD BY AUTOMATED COUNT: 13.2 % (ref 11.6–15.1)
GFR SERPL CREATININE-BSD FRML MDRD: 113 ML/MIN/1.73SQ M
GLUCOSE SERPL-MCNC: 145 MG/DL (ref 65–140)
GLUCOSE SERPL-MCNC: 163 MG/DL (ref 65–140)
GLUCOSE SERPL-MCNC: 167 MG/DL (ref 65–140)
HCT VFR BLD AUTO: 29.6 % (ref 34.8–46.1)
HGB BLD-MCNC: 10.1 G/DL (ref 11.5–15.4)
MCH RBC QN AUTO: 28.9 PG (ref 26.8–34.3)
MCHC RBC AUTO-ENTMCNC: 34.1 G/DL (ref 31.4–37.4)
MCV RBC AUTO: 85 FL (ref 82–98)
PLATELET # BLD AUTO: 238 THOUSANDS/UL (ref 149–390)
PMV BLD AUTO: 9.7 FL (ref 8.9–12.7)
POTASSIUM SERPL-SCNC: 3.1 MMOL/L (ref 3.5–5.3)
RBC # BLD AUTO: 3.49 MILLION/UL (ref 3.81–5.12)
SODIUM SERPL-SCNC: 133 MMOL/L (ref 135–147)
WBC # BLD AUTO: 12.81 THOUSAND/UL (ref 4.31–10.16)

## 2024-04-03 PROCEDURE — 97530 THERAPEUTIC ACTIVITIES: CPT

## 2024-04-03 PROCEDURE — 85027 COMPLETE CBC AUTOMATED: CPT | Performed by: PHYSICIAN ASSISTANT

## 2024-04-03 PROCEDURE — 99232 SBSQ HOSP IP/OBS MODERATE 35: CPT | Performed by: PHYSICIAN ASSISTANT

## 2024-04-03 PROCEDURE — 80048 BASIC METABOLIC PNL TOTAL CA: CPT | Performed by: PHYSICIAN ASSISTANT

## 2024-04-03 PROCEDURE — 99024 POSTOP FOLLOW-UP VISIT: CPT | Performed by: STUDENT IN AN ORGANIZED HEALTH CARE EDUCATION/TRAINING PROGRAM

## 2024-04-03 PROCEDURE — 82948 REAGENT STRIP/BLOOD GLUCOSE: CPT

## 2024-04-03 PROCEDURE — NC001 PR NO CHARGE

## 2024-04-03 PROCEDURE — 97116 GAIT TRAINING THERAPY: CPT

## 2024-04-03 RX ORDER — POTASSIUM CHLORIDE 20 MEQ/1
40 TABLET, EXTENDED RELEASE ORAL ONCE
Status: COMPLETED | OUTPATIENT
Start: 2024-04-03 | End: 2024-04-03

## 2024-04-03 RX ADMIN — DOCUSATE SODIUM 100 MG: 100 CAPSULE, LIQUID FILLED ORAL at 08:46

## 2024-04-03 RX ADMIN — LUBIPROSTONE 8 MCG: 8 CAPSULE, GELATIN COATED ORAL at 08:46

## 2024-04-03 RX ADMIN — OXYCODONE HYDROCHLORIDE AND ACETAMINOPHEN 500 MG: 500 TABLET ORAL at 08:47

## 2024-04-03 RX ADMIN — INSULIN LISPRO 1 UNITS: 100 INJECTION, SOLUTION INTRAVENOUS; SUBCUTANEOUS at 08:45

## 2024-04-03 RX ADMIN — OXYCODONE 5 MG: 5 TABLET ORAL at 08:46

## 2024-04-03 RX ADMIN — MULTIPLE VITAMINS W/ MINERALS TAB 1 TABLET: TAB ORAL at 08:47

## 2024-04-03 RX ADMIN — SENNOSIDES 8.6 MG: 8.6 TABLET, FILM COATED ORAL at 08:47

## 2024-04-03 RX ADMIN — INSULIN LISPRO 1 UNITS: 100 INJECTION, SOLUTION INTRAVENOUS; SUBCUTANEOUS at 11:57

## 2024-04-03 RX ADMIN — POTASSIUM CHLORIDE 40 MEQ: 1500 TABLET, EXTENDED RELEASE ORAL at 08:46

## 2024-04-03 RX ADMIN — OXYCODONE HYDROCHLORIDE 10 MG: 10 TABLET ORAL at 03:12

## 2024-04-03 RX ADMIN — ARIPIPRAZOLE 10 MG: 10 TABLET ORAL at 08:47

## 2024-04-03 RX ADMIN — PANTOPRAZOLE SODIUM 40 MG: 40 TABLET, DELAYED RELEASE ORAL at 08:47

## 2024-04-03 RX ADMIN — HYDROCHLOROTHIAZIDE 12.5 MG: 12.5 TABLET ORAL at 08:47

## 2024-04-03 RX ADMIN — GABAPENTIN 800 MG: 400 CAPSULE ORAL at 08:47

## 2024-04-03 RX ADMIN — FOLIC ACID 1 MG: 1 TABLET ORAL at 08:47

## 2024-04-03 RX ADMIN — OXYCODONE 5 MG: 5 TABLET ORAL at 13:00

## 2024-04-03 RX ADMIN — ACETAMINOPHEN 325MG 975 MG: 325 TABLET ORAL at 13:00

## 2024-04-03 RX ADMIN — LEVOTHYROXINE SODIUM 25 MCG: 25 TABLET ORAL at 05:17

## 2024-04-03 RX ADMIN — ACETAMINOPHEN 325MG 975 MG: 325 TABLET ORAL at 05:17

## 2024-04-03 RX ADMIN — ASPIRIN 81 MG: 81 TABLET, COATED ORAL at 08:47

## 2024-04-03 RX ADMIN — FERROUS SULFATE TAB 325 MG (65 MG ELEMENTAL FE) 325 MG: 325 (65 FE) TAB at 08:47

## 2024-04-03 RX ADMIN — METOPROLOL TARTRATE 50 MG: 50 TABLET, FILM COATED ORAL at 05:16

## 2024-04-03 NOTE — PROGRESS NOTES
[facility]  Progress Note  Name: Bhavana Smith I  MRN: [id number]  Unit/Bed#: E2 -01 I Date of Admission: 4/1/2024   Date of Service: 4/3/2024 I Hospital Day: 1    Assessment/Plan   * Primary osteoarthritis of right knee  Assessment & Plan  Patient underwent elective right knee arthoplasty by Dr. Vincent on 4/1/24, management per orthopedic surgery   Recommending is for rehab but patient stating she wants to go home   If going home today recommend CBC in 1 week outpatient   DVT PPX with ASA 81 mg BID as per ortho.  Pain control per ortho   PT/OT  SLIM will follow peripherally please call with questions or cocnerns     Anemia  Assessment & Plan  ABLA post op from 11.8 to 9.6   Stable today at 10.1   Monitor for signs of bleeding   Transfuse for hgb <7     Lab Results   Component Value Date    HGB 10.1 (L) 04/03/2024    HGB 9.6 (L) 04/02/2024    HGB 11.8 03/07/2024    HGB 12.8 11/14/2023    HGB 13.6 11/02/2023         Diabetic gastroparesis  (HCC)  Assessment & Plan  Follows with GI outpatient     Type 2 diabetes mellitus with obesity  (HCC)  Assessment & Plan    Lab Results   Component Value Date    HGBA1C 5.8 (H) 03/07/2024     Well controlled on metformin. No longer taking insulin.   Hold metformin while admitted. Start sliding scale. Resume metformin on discharge  SSI, accuchecks while here     Tobacco abuse  Assessment & Plan  Smoking cessation education     Acquired hypothyroidism  Assessment & Plan  Continue home levothyroxine    Bipolar 1 disorder, depressed (HCC)  Assessment & Plan  Continue home meds abilify and trazodone    Essential hypertension  Assessment & Plan  Continue home meds HCTZ 12.5 mg daily, metoprolol tartrate 50 mg bid   BP stable   Replete potassium this morning            VTE Pharmacologic Prophylaxis:    asa 81 mg bi per ortho     Mobility:   Basic Mobility Inpatient Raw Score: 18  JH-HLM Goal: 6: Walk 10 steps or more  JH-HLM Achieved: 6: Walk 10 steps or  more  JH-HLM Goal achieved. Continue to encourage appropriate mobility.    Patient Centered Rounds: CM   Discussions with Specialists or Other Care Team Provider:     Education and Discussions with Family / Patient: mi     Total Time Spent on Date of Encounter in care of patient:  mins. This time was spent on one or more of the following: performing physical exam; counseling and coordination of care; obtaining or reviewing history; documenting in the medical record; reviewing/ordering tests, medications or procedures; communicating with other healthcare professionals and discussing with patient's family/caregivers.    Current Length of Stay: 1 day(s)  Current Patient Status: Inpatient   Certification Statement: The patient will continue to require additional inpatient hospital stay due to s/p right knee replacement   Discharge Plan:  per ortho     Code Status: Level 1 - Full Code    Subjective:   Patient lying in bed. Still with pain. She stated to me this morning she does nto want to go to rehab goals are to go home.     Objective:     Vitals:   Temp (24hrs), Av.5 °F (36.4 °C), Min:97 °F (36.1 °C), Max:98.8 °F (37.1 °C)    Temp:  [97 °F (36.1 °C)-98.8 °F (37.1 °C)] 97 °F (36.1 °C)  HR:  [80-99] 80  Resp:  [18-20] 18  BP: (119-180)/(75-94) 122/75  SpO2:  [91 %-96 %] 92 %  Body mass index is 30.42 kg/m².     Input and Output Summary (last 24 hours):     Intake/Output Summary (Last 24 hours) at 4/3/2024 1118  Last data filed at 4/3/2024 0901  Gross per 24 hour   Intake --   Output 3150 ml   Net -3150 ml       Physical Exam:   Physical Exam  Vitals and nursing note reviewed.   Constitutional:       Appearance: She is obese.   Cardiovascular:      Rate and Rhythm: Normal rate.   Pulmonary:      Effort: Pulmonary effort is normal. No respiratory distress.      Comments: On room air   Musculoskeletal:         General: Tenderness (right knee pain) and deformity present.   Neurological:      Mental Status: She is  alert.   Psychiatric:         Mood and Affect: Mood normal.          Additional Data:     Labs:  Results from last 7 days   Lab Units 04/03/24  0518   WBC Thousand/uL 12.81*   HEMOGLOBIN g/dL 10.1*   HEMATOCRIT % 29.6*   PLATELETS Thousands/uL 238     Results from last 7 days   Lab Units 04/03/24  0518   SODIUM mmol/L 133*   POTASSIUM mmol/L 3.1*   CHLORIDE mmol/L 95*   CO2 mmol/L 30   BUN mg/dL 5   CREATININE mg/dL 0.44*   ANION GAP mmol/L 8   CALCIUM mg/dL 8.9   GLUCOSE RANDOM mg/dL 145*         Results from last 7 days   Lab Units 04/03/24  1113 04/03/24  0603 04/02/24  1604 04/02/24  1104 04/01/24  0706   POC GLUCOSE mg/dl 167* 163* 136 124 111               Lines/Drains:  Invasive Devices       Peripheral Intravenous Line  Duration             Peripheral IV 04/01/24 Right Arm 1 day                          Imaging: Reviewed radiology reports from this admission including: ECHO    Recent Cultures (last 7 days):         Last 24 Hours Medication List:   Current Facility-Administered Medications   Medication Dose Route Frequency Provider Last Rate    acetaminophen  650 mg Oral Q4H PRN Rosemarie Bianchi PA-C      acetaminophen  975 mg Oral Q8H Rosemarie Bianchi PA-C      ARIPiprazole  10 mg Oral Daily Rosemarie Bianchi PA-C      ascorbic acid  500 mg Oral BID Rosemarie Bianchi PA-C      aspirin  81 mg Oral BID Rosemarie Bianchi PA-C      atorvastatin  20 mg Oral Daily With Dinner Rosemarie Bianchi PA-C      calcium carbonate  1,000 mg Oral Daily PRN Rosemarie Bianchi PA-C      clonazePAM  0.5 mg Oral Daily PRN Rosemarie Bianchi PA-C      docusate sodium  100 mg Oral BID Rosemarie Bianchi PA-C      ferrous sulfate  325 mg Oral Daily With Breakfast Rosemarie Bianchi PA-C      folic acid  1 mg Oral Daily Rosemarie Bianchi PA-C      gabapentin  800 mg Oral TID Rosemarie Bianchi PA-C      hydroCHLOROthiazide  12.5 mg Oral Daily Rosemarie Bianchi PA-C      insulin lispro  1-6 Units Subcutaneous TID AC Alta Mcghee MD      lactated  ringers  100 mL/hr Intravenous Continuous Rosemarie Bianchi PA-C Stopped (04/02/24 0634)    levothyroxine  25 mcg Oral Early Morning Rosemarie Bianchi PA-C      lubiprostone  8 mcg Oral BID Rosemarie Bianchi PA-C      metoprolol tartrate  50 mg Oral Q12H Rosemarie Bianchi PA-C      morphine injection  2 mg Intravenous Q2H PRN Rosemarie Bianchi PA-C      multivitamin-minerals  1 tablet Oral Daily Rosemarie Bianchi PA-C      nicotine  21 mg Transdermal Daily Alta Mcghee MD      ondansetron  4 mg Intravenous Q6H PRN Rosemarie Bianchi PA-C      oxyCODONE  10 mg Oral Q4H PRN Rosemarie Bianchi PA-C      oxyCODONE  5 mg Oral Q4H PRN Rosemarie Bianchi PA-C      pantoprazole  40 mg Oral Daily Rosemarie Bianchi PA-C      senna  1 tablet Oral Daily Rosemarie Bianchi PA-C      simethicone  80 mg Oral 4x Daily PRN Rosemarie Bianchi PA-C      SUMAtriptan  25 mg Oral Once PRN Rosemarie Bianchi PA-C      traZODone  200 mg Oral HS Rosemarie Bianchi PA-C          Today, Patient Was Seen By: Sarah Arreguin PA-C    **Please Note: This note may have been constructed using a voice recognition system.**

## 2024-04-03 NOTE — PLAN OF CARE
Problem: PAIN - ADULT  Goal: Verbalizes/displays adequate comfort level or baseline comfort level  Description: Interventions:  - Encourage patient to monitor pain and request assistance  - Assess pain using appropriate pain scale  - Administer analgesics based on type and severity of pain and evaluate response  - Implement non-pharmacological measures as appropriate and evaluate response  - Consider cultural and social influences on pain and pain management  - Notify physician/advanced practitioner if interventions unsuccessful or patient reports new pain  Outcome: Progressing     Problem: SAFETY ADULT  Goal: Patient will remain free of falls  Description: INTERVENTIONS:  - Educate patient/family on patient safety including physical limitations  - Instruct patient to call for assistance with activity   - Consult OT/PT to assist with strengthening/mobility   - Keep Call bell within reach  - Keep bed low and locked with side rails adjusted as appropriate  - Keep care items and personal belongings within reach  - Initiate and maintain comfort rounds  - Make Fall Risk Sign visible to staff  - Initiate/Maintain bed alarm  - Obtain necessary fall risk management equipment:   - Apply yellow socks and bracelet for high fall risk patients  - Consider moving patient to room near nurses station  Outcome: Progressing     Problem: SAFETY ADULT  Goal: Maintain or return to baseline ADL function  Description: INTERVENTIONS:  -  Assess patient's ability to carry out ADLs; assess patient's baseline for ADL function and identify physical deficits which impact ability to perform ADLs (bathing, care of mouth/teeth, toileting, grooming, dressing, etc.)  - Assess/evaluate cause of self-care deficits   - Assess range of motion  - Assess patient's mobility; develop plan if impaired  - Assess patient's need for assistive devices and provide as appropriate  - Encourage maximum independence but intervene and supervise when necessary  -  Involve family in performance of ADLs  - Assess for home care needs following discharge   - Consider OT consult to assist with ADL evaluation and planning for discharge  - Provide patient education as appropriate  Outcome: Progressing     Problem: DISCHARGE PLANNING  Goal: Discharge to home or other facility with appropriate resources  Description: INTERVENTIONS:  - Identify barriers to discharge w/patient and caregiver  - Arrange for needed discharge resources and transportation as appropriate  - Identify discharge learning needs (meds, wound care, etc.)  - Arrange for interpretive services to assist at discharge as needed  - Refer to Case Management Department for coordinating discharge planning if the patient needs post-hospital services based on physician/advanced practitioner order or complex needs related to functional status, cognitive ability, or social support system  Outcome: Progressing     Problem: Knowledge Deficit  Goal: Patient/family/caregiver demonstrates understanding of disease process, treatment plan, medications, and discharge instructions  Description: Complete learning assessment and assess knowledge base.  Interventions:  - Provide teaching at level of understanding  - Provide teaching via preferred learning methods  Outcome: Progressing

## 2024-04-03 NOTE — PROGRESS NOTES
Progress Note - Orthopedics   Bhavana Smith 58 y.o. female MRN: [id number]  Unit/Bed#: E2 -01      Subjective:    58 y.o.female POD 2 right TKA.  No new acute overnight events.  Pain is manageable at a 6/10 currently, but she says the pain increases with weight bearing.  The pain does seem to be a little better than it was over the past couple of days.  She denies subjective fevers, chills, CP, SOB, N/V, or numbness or tingling.     Labs:  0   Lab Value Date/Time    HCT 29.6 (L) 04/03/2024 0518    HCT 28.3 (L) 04/02/2024 0830    HCT 36.9 03/07/2024 1523    HCT 32.3 (L) 01/03/2016 0449    HCT 32.2 (L) 01/02/2016 0550    HCT 35.6 01/01/2016 0420    HGB 10.1 (L) 04/03/2024 0518    HGB 9.6 (L) 04/02/2024 0830    HGB 11.8 03/07/2024 1523    HGB 10.7 (L) 01/03/2016 0449    HGB 10.9 (L) 01/02/2016 0550    HGB 12.1 01/01/2016 0420    PT 11.1 (L) 06/22/2018 2009    INR 0.94 03/07/2024 1523    INR 0.8 06/22/2018 2009    WBC 12.81 (H) 04/03/2024 0518    WBC 10.80 (H) 04/02/2024 0830    WBC 10.12 03/07/2024 1523    WBC 6.98 01/03/2016 0449    WBC 11.66 (H) 01/02/2016 0550    WBC 22.38 (H) 01/01/2016 0420       Meds:    Current Facility-Administered Medications:     acetaminophen (TYLENOL) tablet 650 mg, 650 mg, Oral, Q4H PRN, Rosemarie Bianchi PA-C    acetaminophen (TYLENOL) tablet 975 mg, 975 mg, Oral, Q8H, Rosemarie Bianchi PA-C, 975 mg at 04/03/24 0517    ARIPiprazole (ABILIFY) tablet 10 mg, 10 mg, Oral, Daily, Rosemarie Bianchi PA-C, 10 mg at 04/02/24 0842    ascorbic acid (VITAMIN C) tablet 500 mg, 500 mg, Oral, BID, Rosemarie Bianchi PA-C, 500 mg at 04/02/24 2212    aspirin (ECOTRIN LOW STRENGTH) EC tablet 81 mg, 81 mg, Oral, BID, Rosemarie Bianchi PA-C, 81 mg at 04/02/24 1750    atorvastatin (LIPITOR) tablet 20 mg, 20 mg, Oral, Daily With Dinner, Rosemarie Bianchi PA-C, 20 mg at 04/02/24 1635    calcium carbonate (TUMS) chewable tablet 1,000 mg, 1,000 mg, Oral, Daily PRN, Rosemarie Bianchi PA-C    clonazePAM (KlonoPIN)  tablet 0.5 mg, 0.5 mg, Oral, Daily PRN, Rosemarie Bianchi PA-C, 0.5 mg at 04/01/24 1740    docusate sodium (COLACE) capsule 100 mg, 100 mg, Oral, BID, Rosemarie Bianchi PA-C, 100 mg at 04/02/24 2212    ferrous sulfate tablet 325 mg, 325 mg, Oral, Daily With Breakfast, Rosemarie Bianchi PA-C, 325 mg at 04/02/24 0719    folic acid (FOLVITE) tablet 1 mg, 1 mg, Oral, Daily, Rosemarie Bianchi PA-C, 1 mg at 04/02/24 0842    gabapentin (NEURONTIN) capsule 800 mg, 800 mg, Oral, TID, Rosemarie Bianchi PA-C, 800 mg at 04/02/24 2212    hydroCHLOROthiazide tablet 12.5 mg, 12.5 mg, Oral, Daily, Rosemarie Bianchi PA-C, 12.5 mg at 04/02/24 0841    insulin lispro (HumALOG/ADMELOG) 100 units/mL subcutaneous injection 1-6 Units, 1-6 Units, Subcutaneous, TID AC **AND** Fingerstick Glucose (POCT), , , TID AC, Alta Mcghee MD    lactated ringers infusion, 100 mL/hr, Intravenous, Continuous, Rosemarie Bianchi PA-C, Stopped at 04/02/24 0634    levothyroxine tablet 25 mcg, 25 mcg, Oral, Early Morning, Rosemarie Bianchi PA-C, 25 mcg at 04/03/24 0517    lubiprostone (AMITIZA) capsule 8 mcg, 8 mcg, Oral, BID, Rosemarie Bianchi PA-C, 8 mcg at 04/02/24 2212    metoprolol tartrate (LOPRESSOR) tablet 50 mg, 50 mg, Oral, Q12H, Rosemarie Bianchi PA-C, 50 mg at 04/03/24 0516    morphine injection 2 mg, 2 mg, Intravenous, Q2H PRN, Rosemarie Bianchi PA-C, 2 mg at 04/02/24 0348    multivitamin-minerals (CENTRUM) tablet 1 tablet, 1 tablet, Oral, Daily, Rosemarie Bianchi PA-C, 1 tablet at 04/02/24 0841    nicotine (NICODERM CQ) 21 mg/24 hr TD 24 hr patch 21 mg, 21 mg, Transdermal, Daily, Alta Mcghee MD    ondansetron (ZOFRAN) injection 4 mg, 4 mg, Intravenous, Q6H PRN, Rosemarie Bianchi PA-C    oxyCODONE (ROXICODONE) immediate release tablet 10 mg, 10 mg, Oral, Q4H PRN, Rosemarie Bianchi PA-C, 10 mg at 04/03/24 0312    oxyCODONE (ROXICODONE) IR tablet 5 mg, 5 mg, Oral, Q4H PRN, Rosemarie Bianchi PA-C, 5 mg at 04/01/24 1351    pantoprazole (PROTONIX) EC tablet 40 mg,  "40 mg, Oral, Daily, Rosemarie Bianchi PA-C, 40 mg at 04/02/24 0842    potassium chloride (Klor-Con M20) CR tablet 40 mEq, 40 mEq, Oral, Once, Sarah Arreguin PA-C    senna (SENOKOT) tablet 8.6 mg, 1 tablet, Oral, Daily, Rosemarie Bianchi PA-C, 8.6 mg at 04/02/24 0841    simethicone (MYLICON) chewable tablet 80 mg, 80 mg, Oral, 4x Daily PRN, Rosemarie Bianchi PA-C    SUMAtriptan (IMITREX) tablet 25 mg, 25 mg, Oral, Once PRN, Rosemarie Bianchi PA-C    traZODone (DESYREL) tablet 200 mg, 200 mg, Oral, HS, Rosemarie Bianchi PA-C, 200 mg at 04/01/24 2107    Blood Culture:   Lab Results   Component Value Date    BLOODCX No Growth After 5 Days. 11/14/2023       Wound Culture:   No results found for: \"WOUNDCULT\"    Ins and Outs:  I/O last 24 hours:  In: -   Out: 2950 [Urine:2950]          Physical:  Vitals:    04/03/24 0735   BP: 122/75   Pulse: 80   Resp: 18   Temp: (!) 97 °F (36.1 °C)   SpO2: 92%     Musculoskeletal: right Lower Extremity  Visible skin is without erythema or ecchymosis.  Dressing C/D/I  TTP over the knee  Sensation intact over the toes  Motor intact to +FHL/EHL, +ankle dorsi/plantar flexion  Digits warm and well perfused  Capillary refill < 2 seconds    Assessment:    58 y.o.female POD 2 right TKA with Dr. Vincent on 4/1/24.     Plan:  WBAT RLE  Will monitor for ABLA and administer IVF/prbc as indicated for greater than 2 gram Hgb drop or Hgb < 7.  Hgb this morning is 10.1.  No signs of active bleeding in the operative extremity.  PT/OT  Pain control  DVT ppx with aspirin 81 mg BID for 4 weeks.  Dispo: Ok for discharge from ortho perspective when pain controlled and when cleared by PT/OT.    Halley Agee PA-C      "

## 2024-04-03 NOTE — PLAN OF CARE
Problem: PHYSICAL THERAPY ADULT  Goal: Performs mobility at highest level of function for planned discharge setting.  See evaluation for individualized goals.  Description: Treatment/Interventions: Functional transfer training, LE strengthening/ROM, Elevations, Therapeutic exercise, Endurance training, Patient/family training, Bed mobility, Gait training, Spoke to nursing, OT, Spoke to case management  Equipment Recommended: Walker (pt has)       See flowsheet documentation for full assessment, interventions and recommendations.  Outcome: Progressing  Note: Prognosis: Good  Problem List: Decreased strength, Decreased range of motion, Decreased endurance, Decreased mobility, Pain, Orthopedic restrictions  Assessment: Pt. noted with improved alertness and overall mobility this session. Pt. able to perform all mobility with DS/S and min A for LE management back to bed. Pt. noted with no LOB t/o session. Informed ortho PA and RN pt. was okay for DC from PT standpoint. Cues for LE sequencing for stair negotiation. Pt. was given the SPC to take home. Will continue to follow per PT POC. Pt. reported she will have her mom and SO to help her.  Barriers to Discharge: None  Barriers to Discharge Comments: (+) stairs; post-op pain  Rehab Resource Intensity Level, PT: III (Minimum Resource Intensity)    See flowsheet documentation for full assessment.

## 2024-04-03 NOTE — PLAN OF CARE
Problem: PAIN - ADULT  Goal: Verbalizes/displays adequate comfort level or baseline comfort level  Description: Interventions:  - Encourage patient to monitor pain and request assistance  - Assess pain using appropriate pain scale  - Administer analgesics based on type and severity of pain and evaluate response  - Implement non-pharmacological measures as appropriate and evaluate response  - Consider cultural and social influences on pain and pain management  - Notify physician/advanced practitioner if interventions unsuccessful or patient reports new pain  Outcome: Progressing     Problem: INFECTION - ADULT  Goal: Absence or prevention of progression during hospitalization  Description: INTERVENTIONS:  - Assess and monitor for signs and symptoms of infection  - Monitor lab/diagnostic results  - Monitor all insertion sites, i.e. indwelling lines, tubes, and drains  - Monitor endotracheal if appropriate and nasal secretions for changes in amount and color  - [place] appropriate cooling/warming therapies per order  - Administer medications as ordered  - Instruct and encourage patient and family to use good hand hygiene technique  - Identify and instruct in appropriate isolation precautions for identified infection/condition  Outcome: Progressing     Problem: SAFETY ADULT  Goal: Patient will remain free of falls  Description: INTERVENTIONS:  - Educate patient/family on patient safety including physical limitations  - Instruct patient to call for assistance with activity   - Consult OT/PT to assist with strengthening/mobility   - Keep Call bell within reach  - Keep bed low and locked with side rails adjusted as appropriate  - Keep care items and personal belongings within reach  - Initiate and maintain comfort rounds  - Make Fall Risk Sign visible to staff  - Offer Toileting every 2 Hours, in advance of need  - Initiate/Maintain bed alarm  - Obtain necessary fall risk management equipment: non slip socks  - Apply  yellow socks and bracelet for high fall risk patients  - Consider moving patient to room near nurses station  Outcome: Progressing     Problem: DISCHARGE PLANNING  Goal: Discharge to home or other facility with appropriate resources  Description: INTERVENTIONS:  - Identify barriers to discharge w/patient and caregiver  - Arrange for needed discharge resources and transportation as appropriate  - Identify discharge learning needs (meds, wound care, etc.)  - Arrange for interpretive services to assist at discharge as needed  - Refer to Case Management Department for coordinating discharge planning if the patient needs post-hospital services based on physician/advanced practitioner order or complex needs related to functional status, cognitive ability, or social support system  Outcome: Progressing

## 2024-04-03 NOTE — PHYSICAL THERAPY NOTE
Physical Therapy Treatment Note     04/03/24 1118   PT Last Visit   PT Visit Date 04/03/24   Note Type   Note Type Treatment   Pain Assessment   Pain Assessment Tool 0-10   Pain Score 5   Pain Location/Orientation Orientation: Right;Location: Knee   Restrictions/Precautions   Weight Bearing Precautions Per Order Yes   RLE Weight Bearing Per Order WBAT   Other Precautions Fall Risk;Pain;WBS;Bed Alarm   General   Chart Reviewed Yes   Subjective   Subjective Pt. agreeable to PT. Pt. more alert this session.   Bed Mobility   Supine to Sit 5  Supervision   Additional items Increased time required;Bedrails;HOB elevated   Sit to Supine 4  Minimal assistance   Additional items Assist x 1;Increased time required;LE management;Bedrails;HOB elevated   Transfers   Sit to Stand 5  Supervision   Additional items Increased time required   Stand to Sit 5  Supervision   Additional items Increased time required   Stand pivot 5  Supervision   Additional items Increased time required   Toilet transfer 5  Supervision   Additional items Increased time required;Commode;Verbal cues;Armrests   Ambulation/Elevation   Gait pattern Improper Weight shift;Antalgic;Decreased foot clearance;Decreased R stance;Foward flexed;Short stride;Excessively slow;Decreased toe off;Decreased heel strike   Gait Assistance 5  Supervision   Additional items Assist x 1;Verbal cues   Assistive Device Rolling walker   Distance 180ft   Stair Management Assistance 5  Supervision   Additional items Assist x 1;Verbal cues;Increased time required   Stair Management Technique One rail R;Step to pattern;Foreward;Nonreciprocal;With cane   Number of Stairs 10   Balance   Static Sitting Good   Dynamic Sitting Fair +   Static Standing Fair   Dynamic Standing Fair -   Ambulatory Fair -   Endurance Deficit   Endurance Deficit Yes   Endurance Deficit Description Pain   Activity Tolerance   Activity Tolerance Patient tolerated treatment well   Nurse Made Aware yes   Assessment    Prognosis Good   Problem List Decreased strength;Decreased range of motion;Decreased endurance;Decreased mobility;Pain;Orthopedic restrictions   Assessment Pt. noted with improved alertness and overall mobility this session. Pt. able to perform all mobility with DS/S and min A for LE management back to bed. Pt. noted with no LOB t/o session. Informed ortho PA and RN pt. was okay for DC from PT standpoint. Cues for LE sequencing for stair negotiation. Pt. was given the SPC to take home. Will continue to follow per PT POC. Pt. reported she will have her mom and SO to help her.   Barriers to Discharge None   Goals   Patient Goals None reported   STG Expiration Date 04/09/24   PT Treatment Day 3   Plan   Treatment/Interventions Functional transfer training;Elevations;Gait training;Bed mobility;Equipment eval/education;Patient/family training;Spoke to nursing   Progress Progressing toward goals   PT Frequency Twice a day;5-7x/wk   Discharge Recommendation   Rehab Resource Intensity Level, PT III (Minimum Resource Intensity)   Equipment Recommended Walker   AM-PAC Basic Mobility Inpatient   Turning in Flat Bed Without Bedrails 4   Lying on Back to Sitting on Edge of Flat Bed Without Bedrails 4   Moving Bed to Chair 4   Standing Up From Chair Using Arms 4   Walk in Room 4   Climb 3-5 Stairs With Railing 4   Basic Mobility Inpatient Raw Score 24   Basic Mobility Standardized Score 57.68   [facility] Highest Level Of Mobility   -HLM Goal 8: Walk 250 feet or more   -HLM Achieved 7: Walk 25 feet or more   End of Consult   Patient Position at End of Consult Supine;All needs within reach;Bed/Chair alarm activated         Isaiah Dia PTA    An AM-PAC basic mobility standardized score less than 42.9 suggest the patient may benefit from discharge to post-acute rehab services.

## 2024-04-03 NOTE — UTILIZATION REVIEW
Initial Clinical Review     OP SURGERY 4/1 UPGRADED TO INPATIENT ADMISSION 4/2 FOR CONTINUED TX POST-OP WITH NEED FOR PT/OT EVALS FOR SAFE D/C DISPOSITION     Admission Orders (From admission, onward)       Ordered        04/02/24 1707  INPATIENT ADMISSION  Once                     Orders Placed This Encounter   Procedures    INPATIENT ADMISSION     Standing Status:   Standing     Number of Occurrences:   1     Order Specific Question:   Level of Care     Answer:   Med Surg [16]     Order Specific Question:   Estimated length of stay     Answer:   More than 2 Midnights     Order Specific Question:   Certification     Answer:   I certify that inpatient services are medically necessary for this patient for a duration of greater than two midnights. See H&P and MD Progress Notes for additional information about the patient's course of treatment.     Elective OP surgical procedure -- UPGRADED TO INPATIENT 4/2 FOR CONTINUED POST-OP TX  Age/Sex: 58 y.o. female  Surgery Date: 4/1/2024  Procedure: Right - ARTHROPLASTY KNEE TOTAL- possible same day   Anesthesia: General  Operative Findings:   Severe tricompartmental osteoarthritis with Grade IV changes medial and PF compartments  Numerous loose bodies in the posterior knee   Trochlear dysplasia   Pre-op ROM 5-110  Post-op ROM 0-130+ calf to thigh gravity-assisted flexion    Upgraded to Inpatient    4/2 POD#1 Progress Note:  states knee pain is well-controlled under current pain regimen. Continue pta po meds: asa, protonix, HCTZ, metoprolol, abilify, trazodone, levothyroxine. Hold pta metformin. Start sliding scale with accu-checks while IP. Noted hgb declined to 9.6 today, baseline 11-12, likely post-op blood loss. Continue to monitor for ABLA. Nicotine replacement patch. PT/OT evals for d/c disposition.     4/3 POD #2 --  Pain is manageable at a 6/10 currently, but she says the pain increases with weight bearing.  The pain does seem to be a little better than it was over the  past couple of days.  Dressing C/D/I. TTP over the knee. WBAT RLE. Monitor ABLA. Hgb stable today at 10.1. No signs of active bleeding in the operative extremity. Continue pain control.  DVT ppx with aspirin 81 mg BID for 4 wks. Accu-checks w/ ssi. Smoking cessation education. Replete potassium this AM. PT/OT evals.     Vital Signs:   Date/Time Temp Pulse Resp BP MAP (mmHg) SpO2 Calculated FIO2 (%) - Nasal Cannula Nasal Cannula O2 Flow Rate (L/min) O2 Device Patient Position - Orthostatic VS   04/03/24 0735 97 °F (36.1 °C) Abnormal  80 18 122/75 -- 92 % -- -- None (Room air) Lying   04/03/24 0516 -- 90 -- 153/94 -- -- -- -- -- --   04/03/24 0314 97.1 °F (36.2 °C) Abnormal  91 18 119/91 97 96 % -- -- None (Room air) Lying   04/02/24 2332 97.7 °F (36.5 °C) 99 18 152/89 102 91 % -- -- None (Room air) Lying   04/02/24 1918 98.8 °F (37.1 °C) 98 18 165/91 106 92 % -- -- None (Room air) Lying   04/02/24 1601 97.2 °F (36.2 °C) Abnormal  95 20 180/94 Abnormal  117 92 % -- -- None (Room air) Lying   04/02/24 1132 97.1 °F (36.2 °C) Abnormal  86 18 168/87 -- 95 % -- -- None (Room air) Lying   04/02/24 0732 97.7 °F (36.5 °C) 83 18 168/93 -- 92 % -- -- None (Room air) Lying   04/02/24 0342 98 °F (36.7 °C) 77 18 172/84 Abnormal  120 92 % -- -- None (Room air) Lying   04/01/24 2203 97.9 °F (36.6 °C) 74 20 138/99 124 93 % -- -- None (Room air) Lying   04/01/24 1829 97 °F (36.1 °C) Abnormal  57 22 162/88 100 96 % -- -- None (Room air) Lying   04/01/24 1755 -- 60 -- 155/92 105 94 % -- -- None (Room air) Lying   04/01/24 1724 97.2 °F (36.2 °C) Abnormal  65 24 Abnormal  158/93 119 97 % -- -- None (Room air) Lying   04/01/24 1659 -- 67 -- 152/118 Abnormal  -- -- -- -- -- --   04/01/24 1621 96.5 °F (35.8 °C) Abnormal  67 22 152/118 Abnormal  126 96 % -- -- None (Room air) Sitting   04/01/24 1500 -- 62 -- -- -- 96 % -- -- -- --   04/01/24 1400 -- 74 -- -- -- 95 % -- -- -- --   04/01/24 1215 -- 72 24 Abnormal  145/64 92 96 % -- -- None  (Room air) --   04/01/24 1200 -- 56 12 138/60 -- 98 % -- -- None (Room air) --   04/01/24 1150 -- 65 12 131/59 -- -- -- -- -- --   04/01/24 1034 97 °F (36.1 °C) Abnormal  76 -- 116/65 -- -- -- -- -- --   04/01/24 0850 -- 63 18 139/75 -- 97 % 32 3 L/min Nasal cannula --   04/01/24 0830 -- 62 18 168/80 -- 100 % 32 3 L/min Nasal cannula --   04/01/24 0638 97.5 °F (36.4 °C) 70 16 153/74 106 96 % -- -- None (Room air) Sitting     Pertinent Labs/Diagnostic Test Results:   RADIOLOGY RESULTS   Final Result by  (04/02 1319)      XR knee right 1 or 2 views    (Results Pending)       Results from last 7 days   Lab Units 04/03/24  0518 04/02/24  0830   WBC Thousand/uL 12.81* 10.80*   HEMOGLOBIN g/dL 10.1* 9.6*   HEMATOCRIT % 29.6* 28.3*   PLATELETS Thousands/uL 238 219     Results from last 7 days   Lab Units 04/03/24  0518 04/02/24  0603   SODIUM mmol/L 133* 134*   POTASSIUM mmol/L 3.1* 3.7   CHLORIDE mmol/L 95* 99   CO2 mmol/L 30 29   ANION GAP mmol/L 8 6   BUN mg/dL 5 9   CREATININE mg/dL 0.44* 0.55*   EGFR ml/min/1.73sq m 113 105   CALCIUM mg/dL 8.9 8.6     Results from last 7 days   Lab Units 04/03/24  1113 04/03/24  0603 04/02/24  1604 04/02/24  1104 04/01/24  0706   POC GLUCOSE mg/dl 167* 163* 136 124 111     Results from last 7 days   Lab Units 04/03/24  0518 04/02/24  0603   GLUCOSE RANDOM mg/dL 145* 127     Results from last 7 days   Lab Units 04/01/24  0655   AMPH/METH  Negative   BARBITURATE UR  Negative   BENZODIAZEPINE UR  Negative   COCAINE UR  Negative   METHADONE URINE  Negative   OPIATE UR  Negative   PCP UR  Negative   THC UR  Negative     Results from last 7 days   Lab Units 04/01/24  0710   ETHANOL LVL mg/dL <10             Scheduled Medications:  acetaminophen, 975 mg, Oral, Q8H  ARIPiprazole, 10 mg, Oral, Daily  ascorbic acid, 500 mg, Oral, BID  aspirin, 81 mg, Oral, BID  atorvastatin, 20 mg, Oral, Daily With Dinner  docusate sodium, 100 mg, Oral, BID  ferrous sulfate, 325 mg, Oral, Daily With  Breakfast  folic acid, 1 mg, Oral, Daily  gabapentin, 800 mg, Oral, TID  hydroCHLOROthiazide, 12.5 mg, Oral, Daily  insulin lispro, 1-6 Units, Subcutaneous, TID AC  levothyroxine, 25 mcg, Oral, Early Morning  lubiprostone, 8 mcg, Oral, BID  metoprolol tartrate, 50 mg, Oral, Q12H  multivitamin-minerals, 1 tablet, Oral, Daily  nicotine, 21 mg, Transdermal, Daily  pantoprazole, 40 mg, Oral, Daily  senna, 1 tablet, Oral, Daily  traZODone, 200 mg, Oral, HS    Continuous IV Infusions:  lactated ringers, 100 mL/hr, Intravenous, Continuous    PRN Meds:  acetaminophen, 650 mg, Oral, Q4H PRN  calcium carbonate, 1,000 mg, Oral, Daily PRN  clonazePAM, 0.5 mg, Oral, Daily PRN  morphine injection, 2 mg, Intravenous, Q2H PRN 4/2 x1  ondansetron, 4 mg, Intravenous, Q6H PRN  oxyCODONE, 10 mg, Oral, Q4H PRN 4/1 x1, 4/2 x3, 4/3 x1  oxyCODONE, 5 mg, Oral, Q4H PRN 4/1 x1, 4/3 x1  simethicone, 80 mg, Oral, 4x Daily PRN  SUMAtriptan, 25 mg, Oral, Once PRN      Hospital Course:  The patient was admitted to the hospital on 4/1/2024 and underwent an uncomplicated right total knee arthroplasty. She was transferred to the floor after a brief stay in the post-anesthesia care unit. Her pain was well managed with IV and oral pain medications. She began therapy on post operative day #1. Aspirin was also started for DVT prophylaxis post operatively. While she did have some pain post-operatively, her pain was manageable with medication on discharge date. On discharge date pt was cleared by PT and the medicine team and determined to be safe for discharge.  Daily discussion was had with the patient, nursing staff, orthopaedic team, and family members if present.         Network Utilization Review Department  ATTENTION: Please call with any questions or concerns to [phone] and carefully listen to the prompts so that you are directed to the right person. All voicemails are confidential.   For Discharge needs, contact Care Management DC Support Team  at [phone] opt. 2  Send all requests for admission clinical reviews, approved or denied determinations and any other requests to dedicated fax number below belonging to the campus where the patient is receiving treatment. List of dedicated fax numbers for the Facilities:  FACILITY NAME UR FAX NUMBER   ADMISSION DENIALS (Administrative/Medical Necessity) [phone]   DISCHARGE SUPPORT TEAM (NETWORK) [phone]   PARENT CHILD HEALTH (Maternity/NICU/Pediatrics) [phone]   [facility] [phone]   [facility] [phone]   [facility] [phone]   [facility] [phone]   [facility] [phone]   [facility] [phone]   [facility] [phone]   [facility] [phone]   [facility] [phone]   [facility] [phone]   [facility] [phone]   [facility] [phone]

## 2024-04-03 NOTE — ASSESSMENT & PLAN NOTE
Lab Results   Component Value Date    HGBA1C 5.8 (H) 03/07/2024     Well controlled on metformin. No longer taking insulin.   Hold metformin while admitted. Start sliding scale. Resume metformin on discharge  SSI, accuchecks while here

## 2024-04-03 NOTE — ASSESSMENT & PLAN NOTE
ABLA post op from 11.8 to 9.6   Stable today at 10.1   Monitor for signs of bleeding   Transfuse for hgb <7     Lab Results   Component Value Date    HGB 10.1 (L) 04/03/2024    HGB 9.6 (L) 04/02/2024    HGB 11.8 03/07/2024    HGB 12.8 11/14/2023    HGB 13.6 11/02/2023

## 2024-04-03 NOTE — CASE MANAGEMENT
Case Management ED Discharge Planning Note    Patient name Bhavana Smith  Location East 2 /E2 -* MRN [id number]  : 1965 Date 4/3/2024        OBJECTIVE:  Predictive Model Details          88%  Factor Value    Risk of Hospital Admission or ED Visit Model 37% Number of ED Visits 3     19% Number of Hospitalizations 3     10% Is in Relationship No     7% Has COPD Yes     6% Has Anemia Yes     6% Has CVD Yes     5% Has Depression Yes     5% Has Diabetes Yes     4% Has Chronic Liver Disease Yes     1% Has PCP Yes            Chief Complaint: Primary osteoarthritis of right knee .  Patient Class: Inpatient  Preferred Pharmacy:   [id number] - [place], [place] - [address]  [address] [facility] [address]  Phone: [phone] Fax: [phone]    [facility] - [place], [place] - [address]  [address]  [address]  [facility] [address]  Phone: [phone] Fax: [phone]    [facility] - [place], [place] - [address]  [address]  [address]  [facility] [address]  Phone: [phone] Fax: [phone]    [facility] (New Address) - [address]  [address]  [facility] [address]  Phone: [phone] Fax: [phone]    Primary Care Provider: YONG Wise    Primary Insurance: [facility] REP  Secondary Insurance:     ED Discharge Details:             DME Referral Provided  Referral made for DME?: Yes  DME referral completed for the following items:: Bedside Commode  DME Supplier Name:: [facility]       Additional Comments: BSC delievered to bedside. Delievery ticket signed.

## 2024-04-03 NOTE — DISCHARGE SUMMARY
ORTHOPEDICS DISCHARGE SUMMARY  Bhavana Smith 58 y.o. female MRN: [id number]  Unit/Bed#: E2 -01    Attending Physician: Carlton    Admitting diagnosis: Primary osteoarthritis of right knee [M17.11]  Chronic pain of right knee [M25.561, G89.29]    Discharge diagnosis: Primary osteoarthritis of right knee [M17.11]  Chronic pain of right knee [M25.561, G89.29]    Date of admission: 4/1/2024    Date of discharge: 04/03/24         Procedure: Right - ARTHROPLASTY KNEE TOTAL     HPI:  This is a 58 y.o. year old female that presented to the office with signs and symptoms of right knee osteoarthritis. They tried and failed conservative treatment measures and wished to proceed with surgical intervention. The risks, benefits, and complications of the procedure were discussed with the patient and informed consent was obtained.    Hospital Course:  The patient was admitted to the hospital on 4/1/2024 and underwent an uncomplicated right total knee arthroplasty. She was transferred to the floor after a brief stay in the post-anesthesia care unit. Her pain was well managed with IV and oral pain medications. She began therapy on post operative day #1. Aspirin was also started for DVT prophylaxis post operatively. While she did have some pain post-operatively, her pain was manageable with medication on discharge date. On discharge date pt was cleared by PT and the medicine team and determined to be safe for discharge.  Daily discussion was had with the patient, nursing staff, orthopaedic team, and family members if present.  All questions were answered to the patients satisifaction.      0   Lab Value Date/Time    HGB 10.1 (L) 04/03/2024 0518    HGB 9.6 (L) 04/02/2024 0830    HGB 11.8 03/07/2024 1523    HGB 12.8 11/14/2023 0813    HGB 13.6 11/02/2023 1440    HGB 11.5 10/19/2023 0519    HGB 11.9 10/17/2023 0512    HGB 13.3 10/15/2023 1251    HGB 14.1 09/19/2023 2008    HGB 12.5 09/06/2023 0446    HGB 11.0 (L) 09/05/2023 0519     HGB 13.1 09/03/2023 0014    HGB 12.4 05/03/2023 1246    HGB 11.8 03/20/2023 0458    HGB 12.7 03/19/2023 0544    HGB 12.7 03/18/2023 1946    HGB 12.0 03/18/2023 0436    HGB 14.5 03/17/2023 2123    HGB 11.5 01/27/2023 0904    HGB 11.6 01/26/2023 1807    HGB 13.3 01/26/2023 0540    HGB 14.0 01/25/2023 1251    HGB 14.0 10/31/2022 2042    HGB 13.2 06/22/2022 0510    HGB 14.0 06/19/2022 1017    HGB 14.3 03/28/2022 1519    HGB 14.3 03/27/2022 1611    HGB 13.5 09/02/2021 1512    HGB 11.7 03/29/2021 0620    HGB 11.2 (L) 03/28/2021 0521    HGB 10.8 (L) 03/27/2021 0631    HGB 10.7 (L) 03/26/2021 0506    HGB 10.6 (L) 03/25/2021 0602    HGB 11.3 (L) 03/24/2021 1100    HGB 12.5 03/23/2021 1923    HGB 11.4 (L) 09/29/2020 0533    HGB 11.8 09/27/2020 0519    HGB 13.5 09/26/2020 1140    HGB 13.8 09/25/2020 1206    HGB 13.9 09/02/2020 1538    HGB 13.6 08/22/2020 0556    HGB 13.7 07/28/2020 0737    HGB 12.4 05/13/2020 0619    HGB 12.8 05/12/2020 1248    HGB 14.7 05/12/2020 0005    HGB 14.7 03/24/2020 0412    HGB 13.1 03/14/2020 1211    HGB 14.1 03/13/2020 1304    HGB 12.2 03/03/2020 0434    HGB 13.5 03/02/2020 1704    HGB 13.9 02/10/2020 1802    HGB 12.2 12/24/2019 0448    HGB 14.0 12/22/2019 1812    HGB 13.5 12/12/2019 0809    HGB 13.6 12/11/2019 1957    HGB 16.1 (H) 12/11/2019 1223    HGB 14.6 11/15/2019 0548    HGB 14.5 11/14/2019 1339    HGB 13.8 09/20/2019 1910    HGB 15.7 (H) 09/17/2019 2141    HGB 14.8 09/06/2019 0455    HGB 12.8 05/25/2019 0640    HGB 13.6 05/23/2019 1603    HGB 13.8 05/08/2019 0527    HGB 16.2 (H) 05/07/2019 0540    HGB 15.7 08/09/2018 1825    HGB 16.3 (H) 06/19/2018 1040    HGB 12.5 05/18/2018 0441    HGB 15.7 (H) 05/16/2018 1258    HGB 15.2 04/22/2018 1044    HGB 10.7 (L) 01/03/2016 0449    HGB 10.9 (L) 01/02/2016 0550    HGB 12.1 01/01/2016 0420    HGB 12.7 12/31/2015 0715    HGB 14.0 12/30/2015 0529    HGB 15.6 (H) 12/29/2015 1940    HGB 15.1 01/01/2014 1300       Greater than 2 gram drop which qualifies  for diagnosis of acute blood loss anemia.  Vital signs remained stable and pt was resuscitated with IVF as needed   Body mass index is 30.42 kg/m². mildly obese. Recommend behavior modifications, nutrition, and physical activity.    Discharge Instructions:  The patient was discharged weight bearing as tolerated to the right lower extremity. Aspirin will be continued for DVT ppx. Continue PT/OT. Take pain medications as instructed.    Discharge Medications:  For the complete list of discharge medications, please refer to the patient's medication reconciliation.

## 2024-04-03 NOTE — ASSESSMENT & PLAN NOTE
Patient underwent elective right knee arthoplasty by Dr. Vincent on 4/1/24, management per orthopedic surgery   Recommending is for rehab but patient stating she wants to go home   If going home today recommend CBC in 1 week outpatient   DVT PPX with ASA 81 mg BID as per ortho.  Pain control per ortho   PT/OT

## 2024-04-03 NOTE — CASE MANAGEMENT
Case Management Discharge Planning Note    Patient name Bhavana Smith  Location East 2 /E2 -* MRN [id number]  : 1965 Date 4/3/2024       Current Admission Date: 2024  Current Admission Diagnosis:Primary osteoarthritis of right knee   Patient Active Problem List    Diagnosis Date Noted    Anemia 2024    Chronic pain of right knee 2024    Effusion of right knee 2024    Primary osteoarthritis of right knee 2023    Diabetic gastroparesis  (HCC) 2023    Constipation 2023    Bloated abdomen 2023    Hx of adenomatous colonic polyps 2023    Migraine without aura and without status migrainosus, not intractable 2023    Pulmonary emphysema, unspecified emphysema type ([facility]) 2023    Chronic cough 2022    Hypertensive urgency 2022    Type 2 diabetes mellitus with obesity  ([facility]) 2021    Hypothyroidism 2021    Vitamin D deficiency 2021    Gastroesophageal reflux disease without esophagitis 2020    Bipolar 1 disorder, depressed ([facility]) 2020    Benzodiazepine dependence, continuous ([facility]) 2020    Non compliance w medication regimen 2020    Acquired hypothyroidism 2020    Hypokalemia 2020    Hyperlipemia 2020    Transaminitis 2020    Cognitive dysfunction in bipolar disorder ([facility]) 2020    Tobacco abuse 2020    Bipolar disorder current episode depressed ([facility]) 2020    Elevated lactic acid level 2019    Post-traumatic stress disorder, chronic 2019    Alcohol abuse, in remission 2019    Nausea and vomiting 2018    Abdominal pain 2018    Essential hypertension 2018    Generalized anxiety disorder 2017      LOS (days): 1  Geometric Mean LOS (GMLOS) (days):   Days to GMLOS:     OBJECTIVE:  Risk of Unplanned Readmission Score: 28.46         Current admission status: Inpatient   Preferred Pharmacy:   [facility]  #[id number] - [place], [place] - [address] [facility]  [address] [facility] [place] [address]  Phone: [phone] Fax: [phone]    [facility] - [place], [place] - [address]  [address]  [address]  [facility] [address]  Phone: [phone] Fax: [phone]    [facility] - [place], [place] - [address]  [address]  [address]  [facility] [address]  Phone: [phone] Fax: [phone]    [facility] (New Address) - [place], [place] - [address] [place] [address]  [address]  [facility] [address]  Phone: [phone] Fax: [phone]    Primary Care Provider: YONG Wise    Primary Insurance: [employer] [facility] REP  Secondary Insurance:     DISCHARGE DETAILS:    Discharge planning discussed with:: Patient  Freedom of Choice: Yes  Comments - Freedom of Choice: agreeable to [facility] for PT/OT  CM contacted family/caregiver?: Yes  Were Treatment Team discharge recommendations reviewed with patient/caregiver?: Yes  Did patient/caregiver verbalize understanding of patient care needs?: Yes       Contacts  Patient Contacts: Lavon  Relationship to Patient:: Family  Contact Method: In Person  Reason/Outcome: Discharge Planning, Referral    Requested Home Health Care         Is the patient interested in HHC at discharge?: Yes  Home Health Discipline requested:: Occupational Therapy, Physical Therapy  Home Health Agency Name:: [facility]  Home Health Follow-Up Provider:: Referring Provider  Home Health Services Needed:: Evaluate Functional Status and Safety, Gait/ADL Training, Strengthening/Theraputic Exercises to Improve Function  Homebound Criteria Met:: Uses an Assist Device (i.e. cane, walker, etc)  Supporting Clincal Findings:: Fatigues Easliy in Short Distances, Limited Endurance    DME Referral Provided  Referral made for DME?: Yes  DME referral completed for the following items::  Bedside Commode  DME Supplier Name:: [email or web address]    Other Referral/Resources/Interventions Provided:  Interventions: HHC, DME         Treatment Team Recommendation: Home with Home Health Care  Discharge Destination Plan:: Home with Home Health Care  Transport at Discharge : Family                       Accompanied by: Family member

## 2024-04-03 NOTE — ASSESSMENT & PLAN NOTE
Continue home meds HCTZ 12.5 mg daily, metoprolol tartrate 50 mg bid   BP stable   Replete potassium this morning

## 2024-04-04 ENCOUNTER — HOME CARE VISIT (OUTPATIENT)
Dept: HOME HEALTH SERVICES | Facility: HOME HEALTHCARE | Age: 59
End: 2024-04-04
Payer: COMMERCIAL

## 2024-04-04 ENCOUNTER — TELEPHONE (OUTPATIENT)
Dept: OBGYN CLINIC | Facility: HOSPITAL | Age: 59
End: 2024-04-04

## 2024-04-04 ENCOUNTER — PATIENT OUTREACH (OUTPATIENT)
Dept: FAMILY MEDICINE CLINIC | Facility: CLINIC | Age: 59
End: 2024-04-04

## 2024-04-04 VITALS — SYSTOLIC BLOOD PRESSURE: 118 MMHG | OXYGEN SATURATION: 97 % | HEART RATE: 96 BPM | DIASTOLIC BLOOD PRESSURE: 70 MMHG

## 2024-04-04 DIAGNOSIS — F31.4 BIPOLAR DISORDER, CURRENT EPISODE DEPRESSED, SEVERE, WITHOUT PSYCHOTIC FEATURES (HCC): ICD-10-CM

## 2024-04-04 PROCEDURE — 400013 VN SOC

## 2024-04-04 PROCEDURE — G0151 HHCP-SERV OF PT,EA 15 MIN: HCPCS

## 2024-04-04 RX ORDER — CLONAZEPAM 0.5 MG/1
0.5 TABLET ORAL DAILY PRN
Qty: 30 TABLET | Refills: 1 | Status: SHIPPED | OUTPATIENT
Start: 2024-04-04

## 2024-04-04 NOTE — UTILIZATION REVIEW
NOTIFICATION OF ADMISSION DISCHARGE   This is a Notification of Discharge from [facility]. Please be advised that this patient has been discharge from our facility. Below you will find the admission and discharge date and time including the patient’s disposition.   UTILIZATION REVIEW CONTACT:  Emily Phipps  Utilization   Network Utilization Review Department  Phone: [phone] x carefully listen to the prompts. All voicemails are confidential.  Email: [email or web address]     ADMISSION INFORMATION  PRESENTATION DATE: 4/1/2024  6:09 AM  OBERVATION ADMISSION DATE:   INPATIENT ADMISSION DATE: 4/2/24  5:07 PM   DISCHARGE DATE: 4/3/2024  2:48 PM   DISPOSITION:Home/Self Care    Network Utilization Review Department  ATTENTION: Please call with any questions or concerns to [phone] and carefully listen to the prompts so that you are directed to the right person. All voicemails are confidential.   For Discharge needs, contact Care Management DC Support Team at [phone] opt. 2  Send all requests for admission clinical reviews, approved or denied determinations and any other requests to dedicated fax number below belonging to the campus where the patient is receiving treatment. List of dedicated fax numbers for the Facilities:  FACILITY NAME UR FAX NUMBER   ADMISSION DENIALS (Administrative/Medical Necessity) [phone]   DISCHARGE SUPPORT TEAM ([facility]) [phone]   PARENT CHILD HEALTH (Maternity/NICU/Pediatrics) [phone]   [facility] [phone]   [facility] [phone]   [facility] [phone]   [facility] [phone]   [facility] [phone]   [facility] [phone]   [facility] [phone]   [facility] [phone]   [facility]  [facility] [phone]   [facility] [phone]   [facility] [phone]   [facility] [phone]

## 2024-04-04 NOTE — Clinical Note
----- Message -----  From: Rosemarie Bianchi PA-C  Sent: 4/5/2024   7:44 AM EDT  To: Karoline Pierre PT      Nothing to be done about her dressing, she is to keep her mepilex on until post op visit.  She can remove ACE.    Thanks!    Rosemarie  ----- Message -----  From: Karoline Pierre PT  Sent: 4/4/2024  11:17 PM EDT  To: Halley Agee PA-C; *    Patient declined OT services at this time. OT evaluation will not be completed.     Request wound protocol orders as none on discharge AVS regarding dressing removal and follow up care/ dressings to apply     Thank you , Karoline Pierre, PT

## 2024-04-04 NOTE — Clinical Note
Thank you for the clarification.   ----- Message -----  From: Rosemarie Bianchi PA-C  Sent: 4/5/2024   7:44 AM EDT  To: Karoline Pierre PT      Nothing to be done about her dressing, she is to keep her mepilex on until post op visit.  She can remove ACE.    Thanks!    Rosemarie  ----- Message -----  From: Karoline Pierre PT  Sent: 4/4/2024  11:17 PM EDT  To: Halley Agee PA-C; *    Patient declined OT services at this time. OT evaluation will not be completed.     Request wound protocol orders as none on discharge AVS regarding dressing removal and follow up care/ dressings to apply     Thank you , Karoline Pierre, PT

## 2024-04-04 NOTE — PROGRESS NOTES
ADT alert received. Patient discharged yesterday. Chart reviewed.  Orthopedic nurse navigator contacted patient today to review post-op instructions. I will outreach patient tomorrow.

## 2024-04-04 NOTE — TELEPHONE ENCOUNTER
"Patient contacted for a postoperative follow up assessment. Patient reports doing  \"I'm in pain but other than that.   Patient states current pain level of a  8/10  and is walking with RW.  Patient reports swelling is \"noticeable\" at thigh and foot, and dressing is clean, dry and intact. Patient is icing the site regularly, and we discussed continuing to ICE areas of pain and swelling over the next few weeks, especially after increased activity. She was ordered Home Health PT- they are scheduled to come out today.     We reviewed patients AVS medication list. Patient is taking Tylenol 1000mg every 8 hours, Oxycodone 5mg PRN, Celebrex 200mg BID, ASA 81mg BID,Duricef BID, and senokot daily. Patient had a BM today, we discussed continuing the Senokot until going regularly.     Patient denies nausea, vomiting, abdominal pain, chest pain, shortness of breath, fever, dizziness and calf pain. Patient does not have any other questions or concerns at this time. Pt was encouraged to call with any questions, concerns or issues.    "

## 2024-04-04 NOTE — TELEPHONE ENCOUNTER
Reason for call:   [x] Refill   [] Prior Auth  [] Other:     Office:   [x] PCP/Provider -   [] Specialty/Provider -     Medication:     clonazePAM (KlonoPIN) 0.5 mg tablet       Dose/Frequency: Take 1 tablet (0.5 mg total) by mouth daily as needed for seizures     Quantity: 30 tablet     Pharmacy: [facility] #[id number] [place] [id number] [facility] [phone]     Does the patient have enough for 3 days?   [] Yes   [x] No - Send as HP to POD

## 2024-04-05 ENCOUNTER — PATIENT OUTREACH (OUTPATIENT)
Dept: FAMILY MEDICINE CLINIC | Facility: CLINIC | Age: 59
End: 2024-04-05

## 2024-04-05 NOTE — UTILIZATION REVIEW
Daily Progress Note  Tabitha Gonzalez  MRN: [id number] LOS: 0    Admit Date: 4/3/2024   4/5/2024 08:12 CDT    Subjective:      Chief Complaint:  No chief complaint on file.      Interval History:    Reviewed overnight events and nursing notes.   Improved.  No acute events overnight.  Continues to have runs of tachycardia.    Review of Systems   Constitutional:  Positive for fatigue. Negative for appetite change.   Respiratory:  Positive for chest tightness. Negative for cough and shortness of breath.    Cardiovascular:  Negative for chest pain.   Gastrointestinal:  Negative for abdominal pain, nausea and vomiting.   Neurological:  Positive for weakness.       DIET:  Diet: Cardiac; Healthy Heart (2-3 Na+); Fluid Consistency: Thin (IDDSI 0)    Medications:   dilTIAZem, 5-15 mg/hr, Last Rate: Stopped (04/05/24 0751)      atorvastatin, 20 mg, Oral, Nightly  cilostazol, 100 mg, Oral, BID  enoxaparin, 1 mg/kg, Subcutaneous, Q12H  ferric gluconate, 250 mg, Intravenous, Daily  folic acid-pyridoxine-cyanocobalamin, 1 tablet, Oral, Nightly  metoprolol succinate XL, 50 mg, Oral, Daily  pantoprazole, 40 mg, Oral, Q AM  [Held by provider] rivaroxaban, 20 mg, Oral, Daily  sodium chloride, 10 mL, Intravenous, Q12H        Data:     Code Status:   Code Status and Medical Interventions:   Ordered at: 04/03/24 1105     Level Of Support Discussed With:    Patient     Code Status (Patient has no pulse and is not breathing):    CPR (Attempt to Resuscitate)     Medical Interventions (Patient has pulse or is breathing):    Full Support       Family History   Problem Relation Age of Onset    Coronary artery disease Mother     Cancer Father         lung    Rectal cancer Sister         In her 50's    Colon cancer Sister         rectal cancer in her 50 's    Stomach cancer Brother     Esophageal cancer Neg Hx     Liver cancer Neg Hx     Liver disease Neg Hx      Social History     Socioeconomic History    Marital status:    Tobacco  Initial Clinical Review    Admission: Date/Time/Statement: 9/2/23 AT 2329 TO ED - 9/3/23 AT 0235 OBSERVATION - CONVERTED TO INPATIENT 9/3/23 AT 1448 2ND DYSARTHRIA  WITH FACIAL DROOP R/O CVA + ABDOMINAL PAIN + DISTENTION WITH HISTORY GASTROPARESIS - REQUIRES CONTINUED EVALUATION + MANAGEMENT AFTER OBSERVATION + > 2 MIDNITES - NEURO ASSESSMENT, DIETARY RESTRICTION + MEDICATION ADJUSTMENT    09/04/23 1449  Inpatient Admission  Once        Transfer Service: Hospitalist    Question Answer Comment   Level of Care Med Surg    Estimated length of stay More than 2 Midnights    Certification I certify that inpatient services are medically necessary for this patient for a duration of greater than two midnights. See H&P and MD Progress Notes for additional information about the patient's course of treatment. 09/04/23 1448   09/03/23 0235  Place in Observation  Once        Transfer Service: Hospitalist    Question: Level of Care Answer: Med Surg    09/03/23 0235     ED Arrival Information     Expected   -    Arrival   9/2/2023 23:29    Acuity   Urgent            Means of arrival   Walk-In    Escorted by   Self    Service   Hospitalist    Admission type   Emergency            Arrival complaint   abdominal pain         Chief Complaint   Patient presents with   • Abdominal Pain     Pt presents to the ED with generalized abdominal pain. Pt has been following with PCP and GI with no resolution of pain or symptoms     Initial Presentation:  55 y/o female with PMHx HTN, HLD, Seizures, Palpitations, DM2 with Diabetioc Gastroparesis, Bipolar disorder, cholecystectomy -  presents with 4 day history of intractable "shooting" abdominal pain with distention and no BM x 4 to 5 days but passing gas. Also boyfriend reports noticing a facial droop for the past 4 to 5 days as well. IN ED - /100  Exam: Faicial Asymmetry. CT Head shows no  acute intracranial abnormality.   Labs: WBC 10,450    ED Tx: IV MS x 2, IV Zofran, Protonix, Carafate. Placed in Observation 9/3/23 at 0235 -    Dysarthria  • put the patient on aspirin  • Facial asymmetry noted  • TIA pathway  • Make sure patient is on statin  • Neurology consultation  • MRI and echocardiogram  • Allow permissive hypertension     Essential hypertension  • Allow permissive hypertension given facial droop needing to rule out stroke    9/3 GI CONSULT:   ASSESSMENT/PLAN:    #1. Abdominal bloating/distention and abdominal pain without evidence of obstruction, though patient does appear to be constipated over the last several days. Patient appears to have a generalized clinical picture of slow GI motility, has been diagnosed with gastroparesis recently, is also on different psychiatric and neuropathic medications which can contribute to constipation, and with history of hypothyroidism, many contributing factors     -Agree with MiraLAX twice daily for now until patient begins to have bowel movements     -Would recommend minimizing opioids were possible as these will exacerbate gastroparesis as well as constipation and bowel distention issues     -Can use simethicone as needed     -Encourage regular physical activity and ambulation within the patient's capabilities     -Neurology work-up with regards to reported slurred speech and facial droop     -Continue clear liquid diet for now, but if patient continues to demonstrate tolerance of liquids can gradually advance to a low fiber low residue diet with provisions for small frequent meals     -Continue PPI and Carafate; outpatient plans for EGD in 2 years and colonoscopy in 5 years noted for history of gastric intestinal metaplasia and adenomatous colon polyp      9/4/23 NEUROLOGY:  ASSESSMENT:  79-year-old female with hypertension, hyperlipidemia, diabetes, diabetic gastroparesis, PTSD, bipolar, EtOH abuse who presents with worsening abdominal pain over the past 1 week with associated nausea, and bloating.   Likely related to Use    Smoking status: Former     Current packs/day: 0.00     Types: Cigarettes     Quit date: 2014     Years since quitting: 10.2    Smokeless tobacco: Never   Vaping Use    Vaping status: Former   Substance and Sexual Activity    Alcohol use: Yes     Comment: Rarely     Drug use: No    Sexual activity: Defer       Labs:    Lab Results (last 72 hours)       Procedure Component Value Units Date/Time    Manual Differential [[id number]]  (Abnormal) Collected: 04/05/24 0500    Specimen: Blood Updated: 04/05/24 0614     Neutrophil % 78.4 %      Lymphocyte % 15.5 %      Monocyte % 1.0 %      Eosinophil % 3.1 %      Atypical Lymphocyte % 2.1 %      Neutrophils Absolute 5.83 10*3/mm3      Lymphocytes Absolute 1.31 10*3/mm3      Monocytes Absolute 0.07 10*3/mm3      Eosinophils Absolute 0.23 10*3/mm3      Anisocytosis Slight/1+     Crenated RBC's Slight/1+     Microcytes Slight/1+     Ovalocytes Slight/1+     Poikilocytes Slight/1+     Polychromasia Slight/1+     WBC Morphology Normal     Platelet Morphology Normal    Comprehensive Metabolic Panel [[id number]]  (Abnormal) Collected: 04/05/24 0500    Specimen: Blood Updated: 04/05/24 0609     Glucose 120 mg/dL      BUN 13 mg/dL      Creatinine 0.97 mg/dL      Sodium 142 mmol/L      Potassium 3.9 mmol/L      Chloride 111 mmol/L      CO2 21.0 mmol/L      Calcium 8.7 mg/dL      Total Protein 6.1 g/dL      Albumin 3.5 g/dL      ALT (SGPT) 12 U/L      AST (SGOT) 12 U/L      Alkaline Phosphatase 115 U/L      Total Bilirubin 0.2 mg/dL      Globulin 2.6 gm/dL      A/G Ratio 1.3 g/dL      BUN/Creatinine Ratio 13.4     Anion Gap 10.0 mmol/L      eGFR 64.6 mL/min/1.73     Narrative:      GFR Normal >60  Chronic Kidney Disease <60  Kidney Failure <15      CBC & Differential [[id number]]  (Abnormal) Collected: 04/05/24 0500    Specimen: Blood Updated: 04/05/24 0552    Narrative:      The following orders were created for panel order CBC & Differential.  Procedure                                Abnormality         Status                     ---------                               -----------         ------                     CBC Auto Differential[[id number]]        Abnormal            Final result                 Please view results for these tests on the individual orders.    CBC Auto Differential [[id number]]  (Abnormal) Collected: 04/05/24 0500    Specimen: Blood Updated: 04/05/24 0552     WBC 7.44 10*3/mm3      RBC 4.10 10*6/mm3      Hemoglobin 9.0 g/dL      Hematocrit 30.4 %      MCV 74.1 fL      MCH 22.0 pg      MCHC 29.6 g/dL      RDW 14.5 %      RDW-SD 38.9 fl      MPV 11.0 fL      Platelets 323 10*3/mm3      nRBC 0.0 /100 WBC     POC Glucose Once [[id number]]  (Normal) Collected: 04/04/24 0719    Specimen: Blood Updated: 04/04/24 0745     Glucose 128 mg/dL      Comment: : [id number] Gutierrez AshleyMeter ID: [id number]       Comprehensive Metabolic Panel [[id number]]  (Abnormal) Collected: 04/04/24 0600    Specimen: Blood Updated: 04/04/24 0643     Glucose 115 mg/dL      BUN 12 mg/dL      Creatinine 1.09 mg/dL      Sodium 141 mmol/L      Potassium 4.0 mmol/L      Chloride 110 mmol/L      CO2 24.0 mmol/L      Calcium 8.9 mg/dL      Total Protein 6.3 g/dL      Albumin 3.7 g/dL      ALT (SGPT) 13 U/L      AST (SGOT) 14 U/L      Alkaline Phosphatase 119 U/L      Total Bilirubin 0.3 mg/dL      Globulin 2.6 gm/dL      A/G Ratio 1.4 g/dL      BUN/Creatinine Ratio 11.0     Anion Gap 7.0 mmol/L      eGFR 56.1 mL/min/1.73     Narrative:      GFR Normal >60  Chronic Kidney Disease <60  Kidney Failure <15      CBC & Differential [[id number]]  (Abnormal) Collected: 04/04/24 0600    Specimen: Blood Updated: 04/04/24 0625    Narrative:      The following orders were created for panel order CBC & Differential.  Procedure                               Abnormality         Status                     ---------                               -----------         ------                     CBC Auto Differential[[id number]]    gastroparesis. Patient's significant other reports that patient has also had some slurred speech with some facial asymmetry for the past few days as well. Patient however denies this.  -Passed SLP evaluation. -MRI brain negative for any acute or subacute ischemia.  -Patient is on considerable sedating medications including gabapentin 800 3 times daily and (clonazepam 0.5 mg twice daily as needed seizures ever UDS negative for benzos)  -EtOH level less than 10.  -Blood pressure initially elevated 174/100 however has since normalized.      RECOMMENDATIONS:  Low clinical suspicion for acute vascular event given duration of symptoms and negative MRI (TIA unlikely). -Suspect some degree of encephalopathy, unclear etiology but may be related to sedating meds as mentioned above.  -Cannot entirely exclude subacute or chronic CVA given risk factors. - Would add aspirin 81 mg daily to patient's home medication regiment. -Given negative MRI, no need for any additional stroke work-up at this time.     CONVERTED TO INPATIENT 9/3/23 AT 1448 2ND DYSARTHRIA  WITH FACIAL DROOP R/O CVA + ABDOMINAL PAIN + DISTENTION WITH HISTORY GASTROPARESIS - REQUIRES CONTINUED EVALUATION + MANAGEMENT AFTER OBSERVATION + > 2 MIDNITES - NEURO ASSESSMENT, DIETARY RESTRICTION + MEDICATION ADJUSTMENT    9/5/23 - DAY 3:  has surpassed a 2nd midnight with active treatments and services, which include - neuro assessment, dietary restrictions, medication adjustments    ED Triage Vitals [09/02/23 2343]   Temperature Pulse Respirations Blood Pressure SpO2   98.7 °F (37.1 °C) 89 16 (!) 174/100 98 %      Temp Source Heart Rate Source Patient Position - Orthostatic VS BP Location FiO2 (%)   Oral Monitor Sitting Left arm --      Pain Score       10 - Worst Possible Pain          Wt Readings from Last 1 Encounters:   09/03/23 72.1 kg (159 lb)     Additional Vital Signs:  Date/Time Temp Pulse Resp BP MAP (mmHg) SpO2 O2 Device Patient Position - Orthostatic VS      Abnormal            Final result                 Please view results for these tests on the individual orders.    CBC Auto Differential [[id number]]  (Abnormal) Collected: 04/04/24 0600    Specimen: Blood Updated: 04/04/24 0625     WBC 7.26 10*3/mm3      RBC 4.03 10*6/mm3      Hemoglobin 8.9 g/dL      Hematocrit 29.8 %      MCV 73.9 fL      MCH 22.1 pg      MCHC 29.9 g/dL      RDW 14.6 %      RDW-SD 39.3 fl      MPV 11.2 fL      Platelets 325 10*3/mm3      Neutrophil % 57.5 %      Lymphocyte % 27.5 %      Monocyte % 9.6 %      Eosinophil % 3.6 %      Basophil % 1.5 %      Neutrophils, Absolute 4.17 10*3/mm3      Lymphocytes, Absolute 2.00 10*3/mm3      Monocytes, Absolute 0.70 10*3/mm3      Eosinophils, Absolute 0.26 10*3/mm3      Basophils, Absolute 0.11 10*3/mm3     Occult Blood X 1, Stool - Stool, Per Rectum [[id number]]  (Normal) Collected: 04/03/24 2056    Specimen: Stool from Per Rectum Updated: 04/03/24 2107     Fecal Occult Blood Negative    High Sensitivity Troponin T [[id number]]  (Normal) Collected: 04/03/24 1345    Specimen: Blood Updated: 04/03/24 1417     HS Troponin T 9 ng/L     Narrative:      High Sensitive Troponin T Reference Range:  <14.0 ng/L- Negative Female for AMI  <22.0 ng/L- Negative Male for AMI  >=14 - Abnormal Female indicating possible myocardial injury.  >=22 - Abnormal Male indicating possible myocardial injury.   Clinicians would have to utilize clinical acumen, EKG, Troponin, and serial changes to determine if it is an Acute Myocardial Infarction or myocardial injury due to an underlying chronic condition.         Iron Profile [[id number]]  (Abnormal) Collected: 04/03/24 1216    Specimen: Blood Updated: 04/03/24 1320     Iron 15 mcg/dL      Iron Saturation (TSAT) 3 %      Transferrin 314 mg/dL      TIBC 468 mcg/dL     High Sensitivity Troponin T 2Hr [[id number]]  (Normal) Collected: 04/03/24 1216    Specimen: Blood Updated: 04/03/24 1303     HS Troponin T 9 ng/L      Troponin T  09/05/23 0738 98.6 °F (37 °C) 70 20 136/77 -- 96 % -- Sitting   09/04/23 2215 98 °F (36.7 °C) 62 16 129/67 -- 95 % None (Room air) Lying   09/04/23 1611 97.9 °F (36.6 °C) 65 16 143/76 -- 96 % None (Room air) Lying   09/04/23 0755 97.9 °F (36.6 °C) 69 16 152/76 -- 95 % -- Lying   09/04/23 0330 98.9 °F (37.2 °C) 74 17 146/81 -- 94 % None (Room air) Lying   09/03/23 2330 98 °F (36.7 °C) 63 18 155/81 -- 96 % None (Room air) Lying   09/03/23 1934 97.7 °F (36.5 °C) 58 16 151/75 114 94 % None (Room air) Lying   09/03/23 1428 98 °F (36.7 °C) 72 20 141/71 -- 96 % -- Lying   09/03/23 1106 97.8 °F (36.6 °C) 64 16 113/67 -- 94 % -- Lying   09/03/23 0858 96.1 °F (35.6 °C) Abnormal  75 20 140/74 -- 97 % -- Sitting   09/03/23 0630 -- 62 14 115/66 -- 93 % None (Room air) Lying   09/03/23 0530 -- 59 14 116/59 -- 95 % None (Room air) Lying   09/03/23 0430 -- 79 16 119/68 -- 96 % None (Room air) Lying   09/03/23 0330 97.9 °F (36.6 °C) 87 15 135/78 -- 97 % None (Room air) Lying   09/03/23 0242 -- 85 18 155/91 -- 98 % None (Room air) Standing        09/03 0701   09/04 0700 09/04 0701 09/05 0700   P. O. 590 350   Total Intake(mL/kg) 590 (8.2) 350 (4.9)   Net +590 +350        Unmeasured Urine Occurrence 3 x 1 x   Unmeasured Stool Occurrence 1 x 1 x     Pertinent Labs/Diagnostic Test Results:   MRI brain wo contrast   No MR evidence of acute ischemia. CT abdomen pelvis with contrast   Final Result by Mercedez Metzger MD (09/03 0209)      Stomach is distended consistent with the known diagnosis of delayed gastric emptying. No acute pathology visualized on CT of the abdomen and pelvis with IV without oral contrast.      CTA head and neck with and without contrast   No acute intracranial abnormality. No flow-limiting stenosis or large vessel occlusion within the major vessels of the [place]. No significant stenosis of the cervical carotid or vertebral arteries.      Results from last 7 days   Lab Units "Delta -3 ng/L     Narrative:      High Sensitive Troponin T Reference Range:  <14.0 ng/L- Negative Female for AMI  <22.0 ng/L- Negative Male for AMI  >=14 - Abnormal Female indicating possible myocardial injury.  >=22 - Abnormal Male indicating possible myocardial injury.   Clinicians would have to utilize clinical acumen, EKG, Troponin, and serial changes to determine if it is an Acute Myocardial Infarction or myocardial injury due to an underlying chronic condition.         D-dimer, Quantitative [[id number]]  (Abnormal) Collected: 04/03/24 1108    Specimen: Blood Updated: 04/03/24 1143     D-Dimer, Quantitative 1.02 MCGFEU/mL     Narrative:      According to the assay 's published package insert, a normal (<0.50 MCGFEU/mL) D-dimer result in conjunction with a non-high clinical probability assessment, excludes deep vein thrombosis (DVT) and pulmonary embolism (PE) with high sensitivity.    D-dimer values increase with age and this can make VTE exclusion of an older population difficult. To address this, the [employer], based on best available evidence and recent guidelines, recommends that clinicians use age-adjusted D-dimer thresholds in patients greater than 50 years of age with: a) a low probability of PE who do not meet all Pulmonary Embolism Rule Out Criteria, or b) in those with intermediate probability of PE.   The formula for an age-adjusted D-dimer cut-off is \"age/100\".  For example, a 60 year old patient would have an age-adjusted cut-off of 0.60 MCGFEU/mL and an 80 year old 0.80 MCGFEU/mL.    Protime-INR [[id number]]  (Abnormal) Collected: 04/03/24 1108    Specimen: Blood Updated: 04/03/24 1143     Protime 19.8 Seconds      INR 1.61    TSH [[id number]]  (Normal) Collected: 04/03/24 1108    Specimen: Blood Updated: 04/03/24 1143     TSH 2.530 uIU/mL     Comprehensive Metabolic Panel [[id number]]  (Abnormal) Collected: 04/03/24 1108    Specimen: Blood Updated: 04/03/24 1138     " 09/03/23  0014   WBC Thousand/uL 10.45*   HEMOGLOBIN g/dL 13.1   HEMATOCRIT % 40.6   PLATELETS Thousands/uL 381   NEUTROS ABS Thousands/µL 5.63     Results from last 7 days   Lab Units 09/03/23  0538 09/03/23  0014   SODIUM mmol/L 139 139   POTASSIUM mmol/L 4.1 3.4*   CHLORIDE mmol/L 105 100   CO2 mmol/L 28 31   ANION GAP mmol/L 6 8   BUN mg/dL 9 9   CREATININE mg/dL 0.68 0.79   EGFR ml/min/1.73sq m 98 84   CALCIUM mg/dL 8.7 10.0     Results from last 7 days   Lab Units 09/03/23  0014   AST U/L 14   ALT U/L 14   ALK PHOS U/L 92   TOTAL PROTEIN g/dL 8.1   ALBUMIN g/dL 4.8   TOTAL BILIRUBIN mg/dL 0.34     Results from last 7 days   Lab Units 09/04/23  0712 09/03/23  2123 09/03/23  1623 09/03/23  1108 09/03/23  0902 09/02/23  2345   POC GLUCOSE mg/dl 120 94 114 101 113 164*     Results from last 7 days   Lab Units 09/03/23  0538 09/03/23  0014   GLUCOSE RANDOM mg/dL 100 135     Results from last 7 days   Lab Units 09/03/23  0014   HS TNI 0HR ng/L 4         Results from last 7 days   Lab Units 09/03/23  0014   PROTIME seconds 11.3*   INR  0.80*   PTT seconds 29     Results from last 7 days   Lab Units 09/03/23  0014   LIPASE u/L 38     Results from last 7 days   Lab Units 09/03/23  0014   CLARITY UA  Clear   COLOR UA  Yellow   SPEC GRAV UA  <=1.005   PH UA  6.5   GLUCOSE UA mg/dl Negative   KETONES UA mg/dl Negative   BLOOD UA  Negative   PROTEIN UA mg/dl Negative   NITRITE UA  Negative   BILIRUBIN UA  Negative   UROBILINOGEN UA E.U./dl 0.2   LEUKOCYTES UA  Negative     Results from last 7 days   Lab Units 09/03/23  0014   AMPH/METH  Negative   BARBITURATE UR  Negative   BENZODIAZEPINE UR  Negative   COCAINE UR  Negative   OPIATE UR  Negative   PCP UR  Negative   THC UR  Negative     Results from last 7 days   Lab Units 09/03/23  0014   ETHANOL LVL mg/dL <10     ED Treatment:   Medication Administration from 09/02/2023 3808 to 09/03/2023 0840       Date/Time Order Dose Route Action     09/03/2023 0038 EDT morphine Glucose 100 mg/dL      BUN 8 mg/dL      Creatinine 1.02 mg/dL      Sodium 142 mmol/L      Potassium 3.8 mmol/L      Chloride 108 mmol/L      CO2 22.0 mmol/L      Calcium 9.2 mg/dL      Total Protein 6.8 g/dL      Albumin 3.8 g/dL      ALT (SGPT) 13 U/L      AST (SGOT) 15 U/L      Alkaline Phosphatase 126 U/L      Total Bilirubin 0.3 mg/dL      Globulin 3.0 gm/dL      A/G Ratio 1.3 g/dL      BUN/Creatinine Ratio 7.8     Anion Gap 12.0 mmol/L      eGFR 60.8 mL/min/1.73     Narrative:      GFR Normal >60  Chronic Kidney Disease <60  Kidney Failure <15      High Sensitivity Troponin T [[id number]]  (Normal) Collected: 04/03/24 1108    Specimen: Blood Updated: 04/03/24 1136     HS Troponin T 12 ng/L     Narrative:      High Sensitive Troponin T Reference Range:  <14.0 ng/L- Negative Female for AMI  <22.0 ng/L- Negative Male for AMI  >=14 - Abnormal Female indicating possible myocardial injury.  >=22 - Abnormal Male indicating possible myocardial injury.   Clinicians would have to utilize clinical acumen, EKG, Troponin, and serial changes to determine if it is an Acute Myocardial Infarction or myocardial injury due to an underlying chronic condition.         CBC Auto Differential [[id number]]  (Abnormal) Collected: 04/03/24 1108    Specimen: Blood Updated: 04/03/24 1125     WBC 8.91 10*3/mm3      RBC 4.08 10*6/mm3      Hemoglobin 8.9 g/dL      Hematocrit 30.6 %      MCV 75.0 fL      MCH 21.8 pg      MCHC 29.1 g/dL      RDW 14.8 %      RDW-SD 40.0 fl      MPV 10.8 fL      Platelets 313 10*3/mm3      Neutrophil % 69.4 %      Lymphocyte % 18.5 %      Monocyte % 8.2 %      Eosinophil % 2.4 %      Basophil % 1.1 %      Immature Grans % 0.4 %      Neutrophils, Absolute 6.18 10*3/mm3      Lymphocytes, Absolute 1.65 10*3/mm3      Monocytes, Absolute 0.73 10*3/mm3      Eosinophils, Absolute 0.21 10*3/mm3      Basophils, Absolute 0.10 10*3/mm3      Immature Grans, Absolute 0.04 10*3/mm3      nRBC 0.0 /100 WBC               Objective:  injection 4 mg 4 mg Intravenous Given     09/03/2023 0035 EDT ondansetron (ZOFRAN) injection 4 mg 4 mg Intravenous Given     09/03/2023 0111 EDT iohexol (OMNIPAQUE) 350 MG/ML injection (SINGLE-DOSE) 100 mL 100 mL Intravenous Given     09/03/2023 0245 EDT sucralfate (CARAFATE) tablet 1 g 1 g Oral Not Given     09/03/2023 0245 EDT pantoprazole (PROTONIX) EC tablet 40 mg 40 mg Oral Not Given     09/03/2023 0535 EDT levothyroxine tablet 25 mcg 25 mcg Oral Given     09/03/2023 0343 EDT morphine injection 2 mg 2 mg Intravenous Given     Past Medical History:   Diagnosis Date   • Addiction to drug ([address])    • Adjustment disorder    • Alcohol abuse    • Alcoholism ([address])    • Anxiety    • Bipolar disorder ([address])    • Bowel obstruction (HCC)    • Depression    • Diabetes type 2, controlled ([address])    • Disease of thyroid gland    • Gastritis    • Head injury    • Heart palpitations    • Hyperlipidemia    • Hypertension    • Hypothyroidism    • Psychiatric illness    • PTSD (post-traumatic stress disorder)    • Seizure (HCC)    • Seizures (HCC)    • Sleep difficulties    • Substance abuse (HCC)    • Suicide attempt ([address])    • Withdrawal symptoms, drug or narcotic ([address])      Present on Admission:  • Abdominal pain  • Essential hypertension  • Generalized anxiety disorder  • Bipolar 1 disorder, depressed (HCC)  • Tobacco abuse  • Dysarthria  • Diabetes mellitus type 2 in obese [facility])    Admitting Diagnosis: Abdominal pain [R10.9]  Dysarthria [R47.1]  Stroke-like symptoms [R29.90]    Age/Sex: 56 y.o. female    Admission Orders:  TELEMETRY  VS + Neuro Checks Q1HR X 4 - Q4HRS  NIH STROKE SCALE Q24hrs  X 2  Continuous Pulse Oximetry  UP + OOB as Tolerated  MRI Brain  ECHO  PT + OT Evals   SPEECH Eval Re Dysphagia Assessment  Diet Clear Liquid  BBG qid bef0re meals + BT  PT + OT Evals     Scheduled Medications:  ARIPiprazole, 10 mg, Oral, Daily  aspirin, 81 mg, Oral, Daily  atorvastatin, 40 mg, Oral, QPM  docusate sodium, 100 mg, Oral, "    Vitals: /82 (BP Location: Right arm, Patient Position: Lying)   Pulse 88   Temp 97.8 °F (36.6 °C) (Oral)   Resp 16   Ht 175.3 cm (69\")   Wt 101 kg (223 lb)   SpO2 96%   BMI 32.93 kg/m²    Intake/Output Summary (Last 24 hours) at 2024 0812  Last data filed at 2024 0440  Gross per 24 hour   Intake 720 ml   Output 300 ml   Net 420 ml    Temp (24hrs), Av.9 °F (36.6 °C), Min:97.3 °F (36.3 °C), Max:98.2 °F (36.8 °C)      Physical Exam  Vitals reviewed.   Constitutional:       Appearance: Normal appearance. She is not ill-appearing.   HENT:      Head: Normocephalic and atraumatic.   Eyes:      General:         Right eye: No discharge.         Left eye: No discharge.      Extraocular Movements: Extraocular movements intact.      Conjunctiva/sclera: Conjunctivae normal.   Cardiovascular:      Rate and Rhythm: Regular rhythm. Tachycardia present.      Pulses: Normal pulses.      Heart sounds: No murmur heard.  Pulmonary:      Effort: Pulmonary effort is normal. No respiratory distress.   Abdominal:      General: Abdomen is flat. Bowel sounds are normal. There is no distension.   Musculoskeletal:      Right lower leg: No edema.      Left lower leg: No edema.   Skin:     Capillary Refill: Capillary refill takes less than 2 seconds.   Neurological:      General: No focal deficit present.      Mental Status: She is alert.   Psychiatric:         Mood and Affect: Mood normal.         Behavior: Behavior normal.             Assessment and Plan:     Primary Problem:  Chest discomfort    Hospital Problem list:    Chest discomfort    PVD (peripheral vascular disease) with claudication    Anemia    Palpitations    Chest pain      PMH:  Past Medical History:   Diagnosis Date    AVM (arteriovenous malformation) of colon     Colon polyp     COPD (chronic obstructive pulmonary disease)     DVT (deep venous thrombosis)     Family history of colon cancer     Family history of colonic polyps     GERD " BID  [START ON 9/8/2023] ergocalciferol, 50,000 Units, Oral, Weekly  escitalopram, 10 mg, Oral, Daily  gabapentin, 800 mg, Oral, TID  insulin glargine, 25 Units, Subcutaneous, HS  insulin lispro, 1-5 Units, Subcutaneous, TID AC  insulin lispro, 1-5 Units, Subcutaneous, HS  lactulose, 20 g, Oral, TID  levothyroxine, 25 mcg, Oral, Early Morning  metoclopramide, 10 mg, Oral, BID before lunch/dinner  nicotine, 1 patch, Transdermal, Daily  pantoprazole, 40 mg, Oral, Once  pantoprazole, 40 mg, Oral, BID  polyethylene glycol, 17 g, Oral, BID  sucralfate, 1 g, Oral, Once  sucralfate, 1 g, Oral, 4x Daily (AC & HS)  traZODone, 200 mg, Oral, HS    Continuous IV Infusions: NONE    PRN Meds:  acetaminophen, 650 mg, Oral, Q4H PRN - 9/5 X 1  clonazePAM, 0.5 mg, Oral, BID PRN  ketorolac (TORADOL) injection 15 mg, Intravenous; Q6H PRN - 9/3 X 2  morphine injection, 2 mg, Intravenous, Q4H PRN - 9/3 X 3, 9/4 X 1, 9/5 X 2  ondansetron, 4 mg, Intravenous, Q6H PRN    IP CONSULT TO NEUROLOGY  IP CONSULT TO CASE MANAGEMENT  IP CONSULT TO NUTRITION SERVICES  IP CONSULT TO GASTROENTEROLOGY    Network Utilization Review Department  ATTENTION: Please call with any questions or concerns to [phone] and carefully listen to the prompts so that you are directed to the right person. All voicemails are confidential.  Dionisio Chaney all requests for admission clinical reviews, approved or denied determinations and any other requests to dedicated fax number below belonging to the campus where the patient is receiving treatment.  List of dedicated fax numbers for the Facilities:  [place] DENIALS (Administrative/Medical Necessity) [phone]   [address] [facility] (Maternity/NICU/Pediatrics) [phone]   [address] [place] [address] Drive [address] [place] [address] [phone]   [place] Dimitri Bosworth (gastroesophageal reflux disease)     History of adenomatous polyp of colon     History of transfusion     after vascular surgery    Hypercholesterolemia     Hypertension     IBS (irritable bowel syndrome)     Migraine     Peripheral artery disease     PONV (postoperative nausea and vomiting)     PVD (peripheral vascular disease)        Treatment Plan:  Anemia: Hemoglobin stable.  Negative FOBT.  Severely iron deficient.  -Replace iron IV daily for 3 days, started 4/4/2024.     Chest pressure: Likely secondary to above.  Troponin normal.  Cardiology consulted, appreciate recommendations.     History of DVT: On Xarelto, held during hospitalization on Lovenox.     CODE STATUS: Full     DVT prophylaxis: As above     Disposition: Inpatient    Reviewed treatment plans with the patient and/or family.   30 minutes spent in face to face interaction and coordination of care.     Electronically signed by Glen Tapia MD on 4/5/2024 at 08:12 CDT     [address] [facility] [address] [place] [address] Road [facility] [address] [address] [facility] [address] [facility] [address] [place] [address] [facility] [phone]

## 2024-04-05 NOTE — CASE COMMUNICATION
Patient declined OT services at this time. OT evaluation will not be completed.     Request wound protocol orders as none on discharge AVS regarding dressing removal and follow up care/ dressings to apply     Thank you , Karoline Pierre, PT

## 2024-04-05 NOTE — PROGRESS NOTES
Patient returned my call. She is using ice and elevation for the swelling of her right leg. She does have pain which is relieved with oxycodone. Physical therapy is scheduled for 4/8. Patient declined OT. Sara reports each PT visit will cost $20.00. I advised her to call her insurance carrier.  I will outreach again next week.

## 2024-04-06 NOTE — CASE COMMUNICATION
[facility] has admitted your patient to Home Health service with the following disciplines:      PT  OT ordered though patient declined need for OT    This report is informational only, no response is needed  Primary focus of home health care  musculoskelatal   Patient stated goals of care   I want to be in the kitchen cooking and I want to be able to sleep better with less pain to my knee  Anticipated visit pattern and next visit d ate  2 x week x 2 weeks then DC to outpatient PT   Next visit tentatively Monday April 8th    See medication list   Significant clinical findings   ROM neg 14 extension to 72 degrees flexion with severe pain       Thank you for allowing us to participate in the care of your patient.

## 2024-04-08 ENCOUNTER — HOME CARE VISIT (OUTPATIENT)
Dept: HOME HEALTH SERVICES | Facility: HOME HEALTHCARE | Age: 59
End: 2024-04-08
Payer: COMMERCIAL

## 2024-04-08 VITALS — HEART RATE: 75 BPM | SYSTOLIC BLOOD PRESSURE: 148 MMHG | DIASTOLIC BLOOD PRESSURE: 80 MMHG | OXYGEN SATURATION: 99 %

## 2024-04-08 PROCEDURE — G0151 HHCP-SERV OF PT,EA 15 MIN: HCPCS

## 2024-04-09 DIAGNOSIS — M17.11 PRIMARY OSTEOARTHRITIS OF RIGHT KNEE: ICD-10-CM

## 2024-04-09 RX ORDER — OXYCODONE HYDROCHLORIDE 5 MG/1
5 TABLET ORAL EVERY 4 HOURS PRN
Qty: 42 TABLET | Refills: 0 | Status: SHIPPED | OUTPATIENT
Start: 2024-04-09 | End: 2024-04-19

## 2024-04-09 NOTE — TELEPHONE ENCOUNTER
Patient called requesting refill for Oxycodone. Patient made aware medication was refilled on 4/9/24 for 42 with 0 refills to [facility] pharmacy. Patient instructed to contact the pharmacy to obtain refills of medication. Patient verbalized understanding.

## 2024-04-09 NOTE — TELEPHONE ENCOUNTER
Refill must be reviewed and completed by the office or provider. The refill is unable to be approved or denied by the medication management team.    Cannot be delegated

## 2024-04-09 NOTE — TELEPHONE ENCOUNTER
Patient took her last pill today and she is experiencing a lot of pain. She would like this medication refilled as soon as possible.    Reason for call:   [x] Refill   [] Prior Auth  [] Other:     Office:   [] PCP/Provider -   [x] Specialty/Provider - Ortho Dr. Vincent    Medication: oxyCODONE (Roxicodone) 5 immediate release tablet     Dose/Frequency: Take 1 tablet (5 mg total) by mouth every 4 (four) hours as needed     Quantity: 42    Pharmacy: [facility] #[id number] [facility] [address] Count includes the [facility] [phone]     Does the patient have enough for 3 days?   [] Yes   [x] No - Send as HP to POD

## 2024-04-10 ENCOUNTER — HOME CARE VISIT (OUTPATIENT)
Dept: HOME HEALTH SERVICES | Facility: HOME HEALTHCARE | Age: 59
End: 2024-04-10
Payer: COMMERCIAL

## 2024-04-10 VITALS — SYSTOLIC BLOOD PRESSURE: 158 MMHG | HEART RATE: 93 BPM | OXYGEN SATURATION: 99 % | DIASTOLIC BLOOD PRESSURE: 82 MMHG

## 2024-04-10 PROCEDURE — G0180 MD CERTIFICATION HHA PATIENT: HCPCS | Performed by: STUDENT IN AN ORGANIZED HEALTH CARE EDUCATION/TRAINING PROGRAM

## 2024-04-10 PROCEDURE — G0151 HHCP-SERV OF PT,EA 15 MIN: HCPCS

## 2024-04-11 DIAGNOSIS — R11.2 NAUSEA AND VOMITING, UNSPECIFIED VOMITING TYPE: ICD-10-CM

## 2024-04-11 RX ORDER — ONDANSETRON 4 MG/1
TABLET, FILM COATED ORAL
Qty: 30 TABLET | Refills: 3 | OUTPATIENT
Start: 2024-04-11

## 2024-04-15 ENCOUNTER — HOME CARE VISIT (OUTPATIENT)
Dept: HOME HEALTH SERVICES | Facility: HOME HEALTHCARE | Age: 59
End: 2024-04-15
Payer: COMMERCIAL

## 2024-04-15 ENCOUNTER — TELEPHONE (OUTPATIENT)
Dept: HOME HEALTH SERVICES | Facility: HOME HEALTHCARE | Age: 59
End: 2024-04-15

## 2024-04-15 DIAGNOSIS — D50.9 IRON DEFICIENCY ANEMIA, UNSPECIFIED IRON DEFICIENCY ANEMIA TYPE: ICD-10-CM

## 2024-04-15 RX ORDER — FERROUS SULFATE 324(65)MG
TABLET, DELAYED RELEASE (ENTERIC COATED) ORAL
Qty: 30 TABLET | Refills: 5 | Status: SHIPPED | OUTPATIENT
Start: 2024-04-15

## 2024-04-16 ENCOUNTER — OFFICE VISIT (OUTPATIENT)
Dept: OBGYN CLINIC | Facility: MEDICAL CENTER | Age: 59
End: 2024-04-16

## 2024-04-16 ENCOUNTER — APPOINTMENT (OUTPATIENT)
Dept: RADIOLOGY | Facility: MEDICAL CENTER | Age: 59
End: 2024-04-16
Payer: COMMERCIAL

## 2024-04-16 VITALS
HEIGHT: 63 IN | WEIGHT: 171 LBS | BODY MASS INDEX: 30.3 KG/M2 | SYSTOLIC BLOOD PRESSURE: 161 MMHG | HEART RATE: 90 BPM | DIASTOLIC BLOOD PRESSURE: 93 MMHG

## 2024-04-16 DIAGNOSIS — Z96.651 STATUS POST TOTAL KNEE REPLACEMENT, RIGHT: Primary | ICD-10-CM

## 2024-04-16 DIAGNOSIS — M17.11 PRIMARY OSTEOARTHRITIS OF RIGHT KNEE: ICD-10-CM

## 2024-04-16 DIAGNOSIS — Z96.651 STATUS POST TOTAL KNEE REPLACEMENT, RIGHT: ICD-10-CM

## 2024-04-16 PROCEDURE — 73562 X-RAY EXAM OF KNEE 3: CPT

## 2024-04-16 PROCEDURE — 99024 POSTOP FOLLOW-UP VISIT: CPT | Performed by: STUDENT IN AN ORGANIZED HEALTH CARE EDUCATION/TRAINING PROGRAM

## 2024-04-16 RX ORDER — OXYCODONE HYDROCHLORIDE 5 MG/1
5 TABLET ORAL EVERY 4 HOURS PRN
Qty: 42 TABLET | Refills: 0 | Status: SHIPPED | OUTPATIENT
Start: 2024-04-16 | End: 2024-04-22 | Stop reason: SDUPTHER

## 2024-04-16 NOTE — PROGRESS NOTES
Subjective:Patient seen and examined. Pain controlled. Progressing well, she is ambulating with a cane. Incision without drainage, mepilex dressing intact. Denies fevers or chills. She is attending formal PT.     Physical Exam:  Incision: CDI, no erythema or drainage noted  ROM: 3-75*  5/5 IP/Q/HS/TA/GS, 2+ DP/PT, SILT DP/SP/S/S/TN    XR Right knee: s/p press-fit CR attune TKA, components in good position. No fracture or dislocation.      Assessment/Plan:  57 y/o female doing well 2 weeks s/p Right TKA from 4/1/24    - continue multi-modal pain control    - oxycodone refilled today  - Weight bearing status: WBAT RLE  - DVT ppx: aspirin 81mg twice daily  - Incision care: May shower, do not scrub or submerge incision  - PT/OT, continue working on flexion   - F/U 4 weeks      Scribe Attestation      I,:  Rosemarie Bianchi PA-C am acting as a scribe while in the presence of the attending physician.:       I,:  Rosas Vincent DO personally performed the services described in this documentation    as scribed in my presence.:

## 2024-04-17 ENCOUNTER — HOME CARE VISIT (OUTPATIENT)
Dept: HOME HEALTH SERVICES | Facility: HOME HEALTHCARE | Age: 59
End: 2024-04-17
Payer: COMMERCIAL

## 2024-04-17 ENCOUNTER — PATIENT OUTREACH (OUTPATIENT)
Dept: FAMILY MEDICINE CLINIC | Facility: CLINIC | Age: 59
End: 2024-04-17

## 2024-04-17 VITALS — DIASTOLIC BLOOD PRESSURE: 80 MMHG | HEART RATE: 73 BPM | OXYGEN SATURATION: 99 % | SYSTOLIC BLOOD PRESSURE: 144 MMHG

## 2024-04-17 DIAGNOSIS — Z96.651 STATUS POST REVISION OF TOTAL KNEE REPLACEMENT, RIGHT: Primary | ICD-10-CM

## 2024-04-17 DIAGNOSIS — Z96.651 STATUS POST RIGHT KNEE REPLACEMENT: ICD-10-CM

## 2024-04-17 PROCEDURE — G0151 HHCP-SERV OF PT,EA 15 MIN: HCPCS

## 2024-04-17 NOTE — PROGRESS NOTES
I spoke with Sara who reports she saw her orthopedic surgeon yesterday and per note she is progressing as expected. Pain is controlled with oxycodone. Post-op site is clean and dry.  She continues with physical therapy at home with [facility].  When she has recovered she will call me and I will assist her rescheduling with GI for a POEM procedure.

## 2024-04-19 ENCOUNTER — HOME CARE VISIT (OUTPATIENT)
Dept: HOME HEALTH SERVICES | Facility: HOME HEALTHCARE | Age: 59
End: 2024-04-19
Payer: COMMERCIAL

## 2024-04-19 VITALS — OXYGEN SATURATION: 97 % | SYSTOLIC BLOOD PRESSURE: 160 MMHG | HEART RATE: 71 BPM | DIASTOLIC BLOOD PRESSURE: 94 MMHG

## 2024-04-19 DIAGNOSIS — F31.4 BIPOLAR DISORDER, CURRENT EPISODE DEPRESSED, SEVERE, WITHOUT PSYCHOTIC FEATURES (HCC): ICD-10-CM

## 2024-04-19 DIAGNOSIS — I10 PRIMARY HYPERTENSION: ICD-10-CM

## 2024-04-19 PROCEDURE — G0151 HHCP-SERV OF PT,EA 15 MIN: HCPCS

## 2024-04-22 ENCOUNTER — NURSE TRIAGE (OUTPATIENT)
Age: 59
End: 2024-04-22

## 2024-04-22 ENCOUNTER — HOME CARE VISIT (OUTPATIENT)
Dept: HOME HEALTH SERVICES | Facility: HOME HEALTHCARE | Age: 59
End: 2024-04-22
Payer: COMMERCIAL

## 2024-04-22 VITALS — HEART RATE: 70 BPM | DIASTOLIC BLOOD PRESSURE: 90 MMHG | OXYGEN SATURATION: 99 % | SYSTOLIC BLOOD PRESSURE: 158 MMHG

## 2024-04-22 DIAGNOSIS — M17.11 PRIMARY OSTEOARTHRITIS OF RIGHT KNEE: ICD-10-CM

## 2024-04-22 PROCEDURE — G0151 HHCP-SERV OF PT,EA 15 MIN: HCPCS

## 2024-04-22 RX ORDER — HYDROCHLOROTHIAZIDE 12.5 MG/1
12.5 TABLET ORAL DAILY
Qty: 100 TABLET | Refills: 1 | Status: SHIPPED | OUTPATIENT
Start: 2024-04-22

## 2024-04-22 RX ORDER — OXYCODONE HYDROCHLORIDE 5 MG/1
5 TABLET ORAL EVERY 4 HOURS PRN
Qty: 42 TABLET | Refills: 0 | Status: SHIPPED | OUTPATIENT
Start: 2024-04-22 | End: 2024-04-29 | Stop reason: SDUPTHER

## 2024-04-22 RX ORDER — NALOXONE HYDROCHLORIDE 4 MG/.1ML
SPRAY NASAL
Qty: 1 EACH | Refills: 1 | Status: SHIPPED | OUTPATIENT
Start: 2024-04-22 | End: 2025-04-22

## 2024-04-22 RX ORDER — OXYCODONE HYDROCHLORIDE 5 MG/1
5 TABLET ORAL EVERY 4 HOURS PRN
Qty: 42 TABLET | Refills: 0 | Status: SHIPPED | OUTPATIENT
Start: 2024-04-22 | End: 2024-04-22

## 2024-04-22 NOTE — TELEPHONE ENCOUNTER
Spoke to patient. She confirmed taking the Tylenol 1000mg TID, and requesting a refill of the oxycodone, she states she would like a refill.     We also discussed using OTC Motrin PRN for pain as well, as surgery was 4/1- she is aware I will notify surgeon for refill.

## 2024-04-22 NOTE — TELEPHONE ENCOUNTER
Regarding: post op knee pain  ----- Message from Austen Kaiser sent at 4/22/2024  8:56 AM EDT -----  Pt called the rx refill line in regards to her Oxycodone 5mg script asking for a refill but after some conversation she states that its not really helping - just making her pain tolerable. Pt was in tears on phone in pain - she has only been taking tylenol as of right now and icing her knee as well but she is in a lot of pain and discomfort. Please advise. [phone] pt of Dr Vincent

## 2024-04-24 NOTE — PROGRESS NOTES
PT Evaluation     Today's date: 2024  Patient name: Bhavana Smith  : 1965  MRN: [id number]  Referring provider: Rosemarie Bianchi PA-C  Dx:   Encounter Diagnosis     ICD-10-CM    1. Acute pain of right knee  M25.561       2. Status post right knee replacement  Z96.651 Ambulatory Referral to Physical Therapy          Start Time: 1100  Stop Time: 1145  Total time in clinic (min): 45 minutes    Assessment  Assessment details: Patient presents to PT with R knee pain s/p R TKA on  limiting pt activity tolerance. Postural assessment reveals lack of terminal knee extension resulting in VMO atrophy. Decreased thigh musculature flexibility and increased swelling results in impaired knee AROM in all planes. Decreased hip and thigh musculature strength results in poor unilateral stability and impaired squatting mechanics. Pt is at an increased risk for falls evident during TUG. Functional limitations include sitting, standing, walking, stair negotiation, and ADLs. Pt was independent prior to surgery and she would benefit from skilled PT interventions to address above mentioned impairments, activity limitations, and participation restrictions to reduce risk for injury and return to prior activity tolerance.     Impairments: abnormal gait, abnormal muscle tone, abnormal or restricted ROM, abnormal movement, activity intolerance, impaired balance, impaired physical strength, lacks appropriate home exercise program, pain with function, weight-bearing intolerance, poor posture  and poor body mechanics    Symptom irritability: moderateUnderstanding of Dx/Px/POC: good   Prognosis: good    Goals  STG  1. Improve knee AROM to WNL from gains in flexibility  2. <4/10 pain with >30 min walking without AD from gains in positional tolerance  3. <20 sec on TUG without AD to reduce risk for falls  LTG  1. SLS >25 sec to maximize balance on uneven surfaces  2. Increase knee MMT >4+/5 in all planes to improve unilateral  stability  3. Increase FOTO score to goal to facilitate return to PLOF    Plan  Plan details: Thank you for referring Bhavana Smith to PT at [facility] and for the opportunity to coordinate care.    Patient would benefit from: skilled physical therapy  Planned modality interventions: cryotherapy  Planned therapy interventions: abdominal trunk stabilization, ADL training, activity modification, balance, IASTM, joint mobilization, manual therapy, balance/weight bearing training, body mechanics training, coordination, dressing changes, flexibility, functional ROM exercises, gait training, home exercise program, neuromuscular re-education, patient education, postural training, strengthening, stretching, therapeutic activities, therapeutic exercise and transfer training  Frequency: 2x week  Duration in weeks: 8  Plan of Care beginning date: 4/25/2024  Plan of Care expiration date: 7/18/2024  Treatment plan discussed with: patient        Subjective  For years, patient began experiencing R knee pain with no specific ARMANDO. Pain and R knee swelling became worse so patient underwent imaging where MRI revealed knee OA and b/l meniscus tears. Pt underwent R TKA on 4/1. Pt underwent home PT with improvements in mobility and pain. Pain is described as aching, occasionally sharp shooting, around posterior, medial, and lateral knee and currently rated 7/10. Patient reports numbness around incision site. Pain improves to 4/10 with rest, ice, and elevation. Pain increases to 10/10 with resting in knee extension, sitting (<10 min), STS transfers, standing (<10 min), walking (counter surfing during household ambulation, SPC with community ambulation), stairs (step to pattern ascending and descending) and sleeping (3+ nightly disturbances/week). Patient has increased difficulty with ADLs such as driving, cooking, cleaning, bending down, and lifting. Patient is on disability. Prior to dysfunction, patient was walking without assistance  "and performing ADLs without assistance which she now has difficulty with. Patient goals for PT are to reduce pain and return to PLOF. Pt lives with boyfriend who can provide assistance when necessary. Pt has 2 flights of stairs at home.   Objective    Flowsheet Rows      Flowsheet Row Most Recent Value   PT/OT G-Codes    Current Score 26   Projected Score 58        Posture: slight knee flexion, increased reliance on SPC resulting in lateral trunk lean  5x STS: not performed  SLS: R 7 sec L 25 sec  TU sec with SPC  Palpation: +TTP medial and lateral incision sites, incision healing well  ROM:   Knee flexion: R 78 deg L 140 deg   Knee extension: R -5 deg L 0 deg   MMT:   Knee flexion: R 4-/5 L 4+/5  Knee extension: R 3+/5 L 4/5  Special tests:   VMO tone: R poor L fair       Precautions: Anxiety, Bipolar disorder, Depression, Diabetes type 2, Disease of thyroid gland, Hypertension, PTSD, Seizure, Substance abuse (HCC), Suicide attempt (HCC), LATEX ALLERGY; R TKA on   *HEP: 48NXPTMQ     Manuals                  STM                  PROM                                                           Neuro Re-Ed                   Pt education Prognosis, diagnosis, HEP                 Bridges nv                 Clamshells            Quad set 5\"x20                 SLS                  HS stretch nv                 Calf stretch nv          SLR  nv                 Heel slides 2x10                  Ther Ex                   LAQ nv                 SAQ nv                 Standing HS curls                   Bike nv                                                          Ther Activity                   STS  nv                 Side steps                   Step ups (FW)                   Step ups (LAT)                   Standing HR  nv                 Modalities                   cryotherapy                                              "

## 2024-04-25 ENCOUNTER — OFFICE VISIT (OUTPATIENT)
Dept: PHYSICAL THERAPY | Facility: REHABILITATION | Age: 59
End: 2024-04-25
Payer: COMMERCIAL

## 2024-04-25 DIAGNOSIS — Z96.651 STATUS POST RIGHT KNEE REPLACEMENT: ICD-10-CM

## 2024-04-25 DIAGNOSIS — M25.561 ACUTE PAIN OF RIGHT KNEE: Primary | ICD-10-CM

## 2024-04-25 PROCEDURE — 97112 NEUROMUSCULAR REEDUCATION: CPT

## 2024-04-25 PROCEDURE — 97161 PT EVAL LOW COMPLEX 20 MIN: CPT

## 2024-04-25 NOTE — PROGRESS NOTES
"Daily Note     Today's date: 2024  Patient name: Bhavana Smith  : 1965  MRN: [id number]  Referring provider: Rosemarie Bianchi PA-C  Dx:   Encounter Diagnosis     ICD-10-CM    1. Acute pain of right knee  M25.561       2. Status post right knee replacement  Z96.651           Start Time: 1455  Stop Time: 1555  Total time in clinic (min): 60 minutes    Subjective: Pt reports increased R knee pain from prolonged activities this past week.       Objective: See treatment diary below      Assessment: Tolerated treatment fair. Provided pt education on diagnosis, prognosis, and PRICE to reduce knee pain and inflammation with good understanding. Pt has poor pain tolerance during therapeutic exercises requiring frequent intermittent rest breaks with improved tolerance to session. Pt tolerated SAQ and LAQs with noted quad fasciculations post repetitions, provided verbal and tactile cues to work through full available pain free range while maintaining terminal knee extension at end range with noted quad fatigue towards end of repetitions. Patient demonstrated fatigue post treatment and would benefit from continued PT      Plan: Continue per plan of care.      Precautions: Anxiety, Bipolar disorder, Depression, Diabetes type 2, Disease of thyroid gland, Hypertension, PTSD, Seizure, Substance abuse (HCC), Suicide attempt (HCC), LATEX ALLERGY; R TKA on   *HEP: 48NXPTMQ     Manuals                STM                  PROM   done                                                       Neuro Re-Ed                   Pt education Prognosis, diagnosis, HEP                 Bridges nv 2x4               Clamshells            Quad set 5\"x20 5\"x20               SLS                  HS stretch nv Seated 20\"x3               Calf stretch nv Seated 20\"x3         SLR  nv attempted               Heel slides 2x10  5\"x20               Ther Ex                   LAQ nv 4x5               SAQ nv 4x5               Standing HS " curls                   Bike (ROM) nv                                                         Ther Activity                   STS  nv 2 foam, 3x5               Side steps                   Step ups (FW)                   Step ups (LAT)                   Standing HR  nv 3x10               Modalities                   cryotherapy  10' post

## 2024-04-29 ENCOUNTER — OFFICE VISIT (OUTPATIENT)
Dept: PHYSICAL THERAPY | Facility: REHABILITATION | Age: 59
End: 2024-04-29
Payer: COMMERCIAL

## 2024-04-29 DIAGNOSIS — M17.11 PRIMARY OSTEOARTHRITIS OF RIGHT KNEE: ICD-10-CM

## 2024-04-29 DIAGNOSIS — M25.561 ACUTE PAIN OF RIGHT KNEE: Primary | ICD-10-CM

## 2024-04-29 DIAGNOSIS — Z96.651 STATUS POST RIGHT KNEE REPLACEMENT: ICD-10-CM

## 2024-04-29 DIAGNOSIS — F31.4 BIPOLAR DISORDER, CURRENT EPISODE DEPRESSED, SEVERE, WITHOUT PSYCHOTIC FEATURES (HCC): ICD-10-CM

## 2024-04-29 PROCEDURE — 97110 THERAPEUTIC EXERCISES: CPT

## 2024-04-29 PROCEDURE — 97140 MANUAL THERAPY 1/> REGIONS: CPT

## 2024-04-29 PROCEDURE — 97112 NEUROMUSCULAR REEDUCATION: CPT

## 2024-04-29 RX ORDER — CLONAZEPAM 0.5 MG/1
0.5 TABLET ORAL DAILY PRN
Qty: 30 TABLET | Refills: 1 | Status: SHIPPED | OUTPATIENT
Start: 2024-04-29

## 2024-04-29 RX ORDER — OXYCODONE HYDROCHLORIDE 5 MG/1
5 TABLET ORAL EVERY 4 HOURS PRN
Qty: 42 TABLET | Refills: 0 | Status: SHIPPED | OUTPATIENT
Start: 2024-04-29 | End: 2024-05-09

## 2024-04-29 NOTE — TELEPHONE ENCOUNTER
Reason for call:   [x] Refill   [] Prior Auth  [] Other:     Office:   [] PCP/Provider -   [x] Specialty/Provider - ortho  Medication:     oxyCODONE (Roxicodone) 5 immediate release tablet       Dose/Frequency: Take 1 tablet (5 mg total) by mouth every 4 (four) hours as needed for moderate pain or severe pain for up to 10 days Max Daily Amount: 30 mg     Quantity: #42    Pharmacy: [facility] #[id number]  [address]  [address] [facility] [phone]     Does the patient have enough for 3 days?   [] Yes   [x] No - Send as HP to POD

## 2024-04-29 NOTE — TELEPHONE ENCOUNTER
Patient is at the pharmacy now, after physical therapy. She said she's in a lot of pain and is requesting approval of her refill. She was informed that her request has been routed with high priority

## 2024-04-29 NOTE — TELEPHONE ENCOUNTER
Patient called to request a refill for their oxycodone advised a refill was requested on 4/29/24 and is pending approval. Patient verbalized understanding and is in agreement.

## 2024-04-29 NOTE — TELEPHONE ENCOUNTER
Reason for call:   [x] Refill   [] Prior Auth  [] Other:     Office:   [x] PCP/Provider - Gayle Muñoz  [] Specialty/Provider -     Medication: Clonazepam    Dose/Frequency: 0.5 mg Daily PRN    Quantity: 30     Pharmacy: [facility]    Does the patient have enough for 3 days?   [] Yes   [x] No - Send as HP to POD

## 2024-04-30 DIAGNOSIS — M17.11 PRIMARY OSTEOARTHRITIS OF RIGHT KNEE: ICD-10-CM

## 2024-05-01 ENCOUNTER — APPOINTMENT (EMERGENCY)
Dept: RADIOLOGY | Facility: HOSPITAL | Age: 59
End: 2024-05-01
Payer: COMMERCIAL

## 2024-05-01 ENCOUNTER — HOSPITAL ENCOUNTER (EMERGENCY)
Facility: HOSPITAL | Age: 59
Discharge: HOME | End: 2024-05-01
Attending: INTERNAL MEDICINE
Payer: COMMERCIAL

## 2024-05-01 ENCOUNTER — APPOINTMENT (EMERGENCY)
Dept: CT IMAGING | Facility: HOSPITAL | Age: 59
End: 2024-05-01
Payer: COMMERCIAL

## 2024-05-01 VITALS
WEIGHT: 126.1 LBS | BODY MASS INDEX: 22.34 KG/M2 | RESPIRATION RATE: 16 BRPM | DIASTOLIC BLOOD PRESSURE: 61 MMHG | HEART RATE: 77 BPM | SYSTOLIC BLOOD PRESSURE: 133 MMHG | TEMPERATURE: 99.3 F | OXYGEN SATURATION: 95 %

## 2024-05-01 DIAGNOSIS — R51.9 NONINTRACTABLE HEADACHE, UNSPECIFIED CHRONICITY PATTERN, UNSPECIFIED HEADACHE TYPE: Primary | ICD-10-CM

## 2024-05-01 DIAGNOSIS — I10 HYPERTENSION: ICD-10-CM

## 2024-05-01 DIAGNOSIS — K04.7 DENTAL INFECTION: ICD-10-CM

## 2024-05-01 LAB
BASOPHILS # BLD AUTO: 0.03 THOUSANDS/ÂΜL (ref 0–0.1)
BASOPHILS NFR BLD AUTO: 0 % (ref 0–1)
CARDIAC TROPONIN I PNL SERPL HS: 3 NG/L
EOSINOPHIL # BLD AUTO: 0.13 THOUSAND/ÂΜL (ref 0–0.61)
EOSINOPHIL NFR BLD AUTO: 1 % (ref 0–6)
ERYTHROCYTE [DISTWIDTH] IN BLOOD BY AUTOMATED COUNT: 13.9 % (ref 11.6–15.1)
FLUAV RNA RESP QL NAA+PROBE: NEGATIVE
FLUBV RNA RESP QL NAA+PROBE: NEGATIVE
HCT VFR BLD AUTO: 38.8 % (ref 34.8–46.1)
HGB BLD-MCNC: 12.3 G/DL (ref 11.5–15.4)
IMM GRANULOCYTES # BLD AUTO: 0.05 THOUSAND/UL (ref 0–0.2)
IMM GRANULOCYTES NFR BLD AUTO: 0 % (ref 0–2)
LYMPHOCYTES # BLD AUTO: 2.15 THOUSANDS/ÂΜL (ref 0.6–4.47)
LYMPHOCYTES NFR BLD AUTO: 19 % (ref 14–44)
MCH RBC QN AUTO: 28.1 PG (ref 26.8–34.3)
MCHC RBC AUTO-ENTMCNC: 31.7 G/DL (ref 31.4–37.4)
MCV RBC AUTO: 89 FL (ref 82–98)
MONOCYTES # BLD AUTO: 0.84 THOUSAND/ÂΜL (ref 0.17–1.22)
MONOCYTES NFR BLD AUTO: 8 % (ref 4–12)
NEUTROPHILS # BLD AUTO: 8.01 THOUSANDS/ÂΜL (ref 1.85–7.62)
NEUTS SEG NFR BLD AUTO: 72 % (ref 43–75)
NRBC BLD AUTO-RTO: 0 /100 WBCS
PLATELET # BLD AUTO: 418 THOUSANDS/UL (ref 149–390)
PMV BLD AUTO: 9 FL (ref 8.9–12.7)
RBC # BLD AUTO: 4.37 MILLION/UL (ref 3.81–5.12)
RSV RNA RESP QL NAA+PROBE: NEGATIVE
SARS-COV-2 RNA RESP QL NAA+PROBE: NEGATIVE
WBC # BLD AUTO: 11.21 THOUSAND/UL (ref 4.31–10.16)

## 2024-05-01 PROCEDURE — 70450 CT HEAD/BRAIN W/O DYE: CPT

## 2024-05-01 PROCEDURE — 96375 TX/PRO/DX INJ NEW DRUG ADDON: CPT

## 2024-05-01 PROCEDURE — 99284 EMERGENCY DEPT VISIT MOD MDM: CPT | Performed by: INTERNAL MEDICINE

## 2024-05-01 PROCEDURE — 71045 X-RAY EXAM CHEST 1 VIEW: CPT

## 2024-05-01 PROCEDURE — 93005 ELECTROCARDIOGRAM TRACING: CPT

## 2024-05-01 PROCEDURE — 80053 COMPREHEN METABOLIC PANEL: CPT

## 2024-05-01 PROCEDURE — 0241U HB NFCT DS VIR RESP RNA 4 TRGT: CPT | Performed by: INTERNAL MEDICINE

## 2024-05-01 PROCEDURE — 96374 THER/PROPH/DIAG INJ IV PUSH: CPT

## 2024-05-01 PROCEDURE — 85025 COMPLETE CBC W/AUTO DIFF WBC: CPT

## 2024-05-01 PROCEDURE — 99285 EMERGENCY DEPT VISIT HI MDM: CPT

## 2024-05-01 PROCEDURE — 36415 COLL VENOUS BLD VENIPUNCTURE: CPT

## 2024-05-01 PROCEDURE — 84484 ASSAY OF TROPONIN QUANT: CPT

## 2024-05-01 RX ORDER — AMOXICILLIN 250 MG/1
500 CAPSULE ORAL ONCE
Status: COMPLETED | OUTPATIENT
Start: 2024-05-01 | End: 2024-05-01

## 2024-05-01 RX ORDER — ASCORBIC ACID 500 MG
TABLET ORAL
Qty: 60 TABLET | Refills: 1 | Status: SHIPPED | OUTPATIENT
Start: 2024-05-01

## 2024-05-01 RX ORDER — MULTIVITAMIN
1 TABLET ORAL DAILY
Qty: 30 TABLET | Refills: 1 | Status: SHIPPED | OUTPATIENT
Start: 2024-05-01

## 2024-05-01 RX ORDER — LABETALOL HYDROCHLORIDE 5 MG/ML
10 INJECTION, SOLUTION INTRAVENOUS ONCE
Status: COMPLETED | OUTPATIENT
Start: 2024-05-01 | End: 2024-05-01

## 2024-05-01 RX ORDER — DIPHENHYDRAMINE HYDROCHLORIDE 50 MG/ML
25 INJECTION INTRAMUSCULAR; INTRAVENOUS ONCE
Status: COMPLETED | OUTPATIENT
Start: 2024-05-01 | End: 2024-05-01

## 2024-05-01 RX ORDER — OXYCODONE HYDROCHLORIDE AND ACETAMINOPHEN 5; 325 MG/1; MG/1
1 TABLET ORAL ONCE
Status: COMPLETED | OUTPATIENT
Start: 2024-05-01 | End: 2024-05-01

## 2024-05-01 RX ORDER — CELECOXIB 200 MG/1
CAPSULE ORAL
Qty: 60 CAPSULE | Refills: 0 | Status: SHIPPED | OUTPATIENT
Start: 2024-05-01

## 2024-05-01 RX ORDER — METOCLOPRAMIDE HYDROCHLORIDE 5 MG/ML
10 INJECTION INTRAMUSCULAR; INTRAVENOUS ONCE
Status: COMPLETED | OUTPATIENT
Start: 2024-05-01 | End: 2024-05-01

## 2024-05-01 RX ORDER — AMOXICILLIN 500 MG/1
500 CAPSULE ORAL 3 TIMES DAILY
Qty: 21 CAPSULE | Refills: 0 | Status: SHIPPED | OUTPATIENT
Start: 2024-05-01 | End: 2024-05-08

## 2024-05-01 RX ORDER — FOLIC ACID 1 MG/1
1000 TABLET ORAL DAILY
Qty: 30 TABLET | Refills: 1 | Status: SHIPPED | OUTPATIENT
Start: 2024-05-01

## 2024-05-01 RX ADMIN — AMOXICILLIN 500 MG: 250 CAPSULE ORAL at 08:28

## 2024-05-01 RX ADMIN — DIPHENHYDRAMINE HYDROCHLORIDE 25 MG: 50 INJECTION, SOLUTION INTRAMUSCULAR; INTRAVENOUS at 08:28

## 2024-05-01 RX ADMIN — METOCLOPRAMIDE 10 MG: 5 INJECTION, SOLUTION INTRAMUSCULAR; INTRAVENOUS at 08:28

## 2024-05-01 RX ADMIN — LABETALOL HYDROCHLORIDE 10 MG: 5 INJECTION, SOLUTION INTRAVENOUS at 08:28

## 2024-05-01 RX ADMIN — OXYCODONE HYDROCHLORIDE AND ACETAMINOPHEN 1 TABLET: 5; 325 TABLET ORAL at 09:18

## 2024-05-01 NOTE — ED PROVIDER NOTES
"History  Chief Complaint   Patient presents with    Hypertension     Elevated blood pressure and headache x2 days.  States \"I can't get it under control\"   also reports shakiness when trying to work     This is a 58 years old came for having multiple symptoms.  Patient is stated that her BP is not under control and she has migraine headaches.  Patient denies nausea or vomiting.  Patient has long history of migraine headaches.  Patient used to be on Imitrex and the last time she took it 2 days ago.  Patient is stated that she feels weak.  Patient is stated the headache is frontal headache.  Patient denies fever, neck pain, photophobia.  Patient denies chest pain,, abdominal pain, SOB.  Patient has recent right knee replacement last months.  Patient stated that when she stands up she feels shaking and weak and this goes away in few seconds.  Patient has history of hypertension, diabetes, migraine headache, and psychiatric disorder.  Patient is stated that she take her medication in the morning and at night.  On the arrival to the ER having /98, having temp 99.3 F.  Patient complaining of toothache on the left lower lobe and has some facial swelling.        Prior to Admission Medications   Prescriptions Last Dose Informant Patient Reported? Taking?   ARIPiprazole (ABILIFY) 10 mg tablet  Self No No   Sig: Take 1 tablet (10 mg total) by mouth daily   Alcohol Swabs 70 % PADS  Self No No   Sig: May substitute brand based on insurance coverage. Check glucose TID.   Blood Glucose Monitoring Suppl (OneTouch Verio Reflect) w/Device KIT  Self No No   Sig: May substitute brand based on insurance coverage. Check glucose TID.   Diclofenac Sodium (VOLTAREN) 1 %   No No   Sig: Apply 2 g topically 4 (four) times a day   Microlet Lancets MISC  Self Yes No   Multiple Vitamin (Multivitamin) TABS   No No   Sig: TAKE 1 TABLET BY MOUTH DAILY   Multiple Vitamins-Minerals (multivitamin with minerals) tablet 4/30/2024  No Yes   Sig: " Take 1 tablet by mouth daily   OneTouch Delica Lancets 33G MISC  Self No No   Sig: May substitute brand based on insurance coverage. Check glucose TID.   SUMAtriptan (IMITREX) 25 mg tablet Past Week  No Yes   Sig: TAKE 1 TABLET(25 MG) BY MOUTH DAILY AS NEEDED FOR MIGRAINE. DO NOT TAKE MORE THAN 3 DOSES IN 1 WEEK   acetaminophen (TYLENOL) 500 mg tablet   No No   Sig: Take 2 tablets (1,000 mg total) by mouth every 8 (eight) hours   aluminum-magnesium hydroxide-simethicone (MAALOX) 7170-7320-560 mg/30 mL suspension  Self No No   Sig: Take 30 mL by mouth every 4 (four) hours as needed for indigestion or heartburn (gas)   ascorbic acid (VITAMIN C) 500 mg tablet   No No   Sig: TAKE 1 TABLET(500 MG) BY MOUTH TWICE DAILY   aspirin (ECOTRIN LOW STRENGTH) 81 mg EC tablet   No No   Sig: Take 1 tablet (81 mg total) by mouth 2 (two) times a day   atorvastatin (LIPITOR) 20 mg tablet  Self No No   Sig: Take 1 tablet (20 mg total) by mouth daily   celecoxib (CeleBREX) 200 mg capsule   No No   Sig: TAKE 1 CAPSULE(200 MG) BY MOUTH TWICE DAILY   cholecalciferol (VITAMIN D3) 1,000 units tablet   No No   Sig: Take 2 tablets (2,000 Units total) by mouth daily   clonazePAM (KlonoPIN) 0.5 mg tablet Not Taking  No No   Sig: Take 1 tablet (0.5 mg total) by mouth daily as needed for seizures   Patient not taking: Reported on 5/1/2024   dicyclomine (BENTYL) 10 mg capsule  Self No No   Sig: Take 2 capsules (20 mg total) by mouth 3 (three) times a day as needed (Cramping)   Patient not taking: Reported on 3/20/2024   docusate sodium (COLACE) 100 mg capsule  Self No No   Sig: Take 1 capsule (100 mg total) by mouth 2 (two) times a day for 14 days   ergocalciferol (VITAMIN D2) 50,000 units Past Week  No Yes   Sig: TAKE 1 CAPSULE BY MOUTH ONCE  WEEKLY   escitalopram (LEXAPRO) 10 mg tablet   No No   Sig: TAKE 1 TABLET(10 MG) BY MOUTH DAILY   Patient not taking: Reported on 3/20/2024   ferrous sulfate 324 (65 Fe) mg 4/30/2024  No Yes   Sig: TAKE 1  TABLET(324 MG) BY MOUTH TWICE DAILY BEFORE MEALS   folic acid (FOLVITE) 1 mg tablet   No No   Sig: TAKE 1 TABLET BY MOUTH EVERY DAY   gabapentin (NEURONTIN) 800 mg tablet 4/30/2024  No Yes   Sig: TAKE 1 TABLET BY MOUTH 3 TIMES  DAILY   hydroCHLOROthiazide 12.5 mg tablet 4/30/2024  No Yes   Sig: TAKE 1 TABLET BY MOUTH DAILY   insulin glargine (LANTUS) 100 units/mL subcutaneous injection  Self No No   Sig: Inject 25 Units under the skin daily at bedtime   levothyroxine 25 mcg tablet 4/30/2024  No Yes   Sig: Take 1 tablet (25 mcg total) by mouth daily in the early morning   linaCLOtide 145 MCG CAPS   No No   Sig: Take 1 capsule (145 mcg total) by mouth in the morning   metFORMIN (GLUCOPHAGE) 1000 MG tablet 4/30/2024  No Yes   Sig: TAKE 1 TABLET BY MOUTH TWICE  DAILY WITH MEALS   metoprolol tartrate (LOPRESSOR) 50 mg tablet 4/30/2024  No Yes   Sig: TAKE 1 TABLET BY MOUTH EVERY 12  HOURS   naloxone (NARCAN) 4 mg/0.1 mL nasal spray   No No   Sig: Administer 1 spray into a nostril. If no response after 2-3 minutes, give another dose in the other nostril using a new spray.   ondansetron (ZOFRAN-ODT) 4 mg disintegrating tablet   No No   Sig: Take 1 tablet (4 mg total) by mouth every 6 (six) hours as needed for nausea or vomiting   oxyCODONE (Roxicodone) 5 immediate release tablet 5/1/2024  No Yes   Sig: Take 1 tablet (5 mg total) by mouth every 4 (four) hours as needed for moderate pain or severe pain for up to 10 days Max Daily Amount: 30 mg   pantoprazole (PROTONIX) 40 mg tablet  Self No No   Sig: Take 1 tablet (40 mg total) by mouth 2 (two) times a day   polyethylene glycol (MIRALAX) 17 g packet  Self No No   Sig: Take 17 g by mouth daily   senna-docusate sodium (SENOKOT S) 8.6-50 mg per tablet Not Taking  No No   Sig: Take 1 tablet by mouth daily   Patient not taking: Reported on 5/1/2024   sucralfate (CARAFATE) 1 g tablet   No No   Sig: TAKE 1 TABLET BY MOUTH 4 TIMES  DAILY   Patient not taking: Reported on 3/20/2024    traZODone (DESYREL) 100 mg tablet 4/30/2024  No Yes   Sig: TAKE 2 TABLETS(200 MG) BY MOUTH DAILY AT BEDTIME      Facility-Administered Medications: None       Past Medical History:   Diagnosis Date    Addiction to drug (HCC)     Adjustment disorder     Alcohol abuse     Alcoholism (HCC)     Anxiety     Bipolar disorder (HCC)     Bowel obstruction (HCC)     Depression     Diabetes mellitus (HCC)     Diabetes type 2, controlled (HCC)     Disease of thyroid gland     Gastritis     Head injury     Heart palpitations     Hyperlipidemia     Hypertension     Hypothyroidism     Psychiatric illness     PTSD (post-traumatic stress disorder)     Seizure (HCC)     Seizures (HCC)     Sleep difficulties     Substance abuse (HCC)     Suicide attempt (HCC)     Withdrawal symptoms, drug or narcotic (HCC)        Past Surgical History:   Procedure Laterality Date    ABDOMINAL SURGERY      APPENDECTOMY      BOWEL RESECTION      CHOLECYSTECTOMY      ESOPHAGOGASTRODUODENOSCOPY N/A 05/18/2018    Procedure: ESOPHAGOGASTRODUODENOSCOPY (EGD) with bx;  Surgeon: Lulu West DO;  Location: [facility];  Service: Gastroenterology    HYSTERECTOMY      KNEE SURGERY Bilateral 1991    GA ARTHRP KNE CONDYLE&PLATU MEDIAL&LAT COMPARTMENTS Right 4/1/2024    Procedure: ARTHROPLASTY KNEE TOTAL- possible same day;  Surgeon: Rosas Vincent DO;  Location: [facility];  Service: Orthopedics       Family History   Problem Relation Age of Onset    Bipolar disorder Mother     Cancer Father     Diabetes Father      I have reviewed and agree with the history as documented.    E-Cigarette/Vaping    E-Cigarette Use Never User      E-Cigarette/Vaping Substances    Nicotine No     THC No     CBD No     Flavoring No     Other No     Unknown No      Social History     Tobacco Use    Smoking status: Every Day     Current packs/day: 1.00     Average packs/day: 1 pack/day for 25.0 years (25.0 ttl pk-yrs)     Types: Cigarettes     Passive exposure: Current     Smokeless tobacco: Never    Tobacco comments:     smokes like 5 a day(06/24/2022), smokes 3 cigarettes per day (3/19/24) last smoked 3.31.24   Vaping Use    Vaping status: Never Used   Substance Use Topics    Alcohol use: Not Currently     Alcohol/week: 6.0 standard drinks of alcohol     Types: 6 Cans of beer per week     Comment: last drink on 08/15/20    Drug use: Not Currently       Review of Systems   Constitutional:  Negative for fatigue and fever.   HENT:  Negative for congestion, sinus pressure, sinus pain, sneezing, sore throat, tinnitus and trouble swallowing.    Respiratory:  Negative for cough and shortness of breath.    Cardiovascular:  Negative for chest pain and palpitations.   Gastrointestinal:  Negative for abdominal pain, diarrhea, nausea and vomiting.   Endocrine: Negative for polydipsia, polyphagia and polyuria.   Genitourinary:  Negative for difficulty urinating, dysuria, flank pain and frequency.   Musculoskeletal:  Negative for back pain, myalgias, neck pain and neck stiffness.   Skin:  Negative for color change, pallor and rash.   Neurological:  Positive for weakness and headaches. Negative for dizziness, tremors, seizures, syncope, speech difficulty and light-headedness.   Hematological:  Negative for adenopathy. Does not bruise/bleed easily.   Psychiatric/Behavioral:  Negative for agitation, behavioral problems and confusion.        Physical Exam  Physical Exam  Vitals and nursing note reviewed.   Constitutional:       General: She is not in acute distress.     Appearance: She is well-developed. She is not ill-appearing, toxic-appearing or diaphoretic.   HENT:      Head: Normocephalic and atraumatic.      Right Ear: Tympanic membrane, ear canal and external ear normal.      Left Ear: Tympanic membrane, ear canal and external ear normal.      Nose: Nose normal. No congestion.      Mouth/Throat:      Lips: Pink.      Mouth: Mucous membranes are moist.      Dentition: Dental tenderness,  gingival swelling and dental caries present. No dental abscesses or gum lesions.      Tongue: No lesions. Tongue does not deviate from midline.      Palate: No mass and lesions.      Pharynx: Oropharynx is clear. No oropharyngeal exudate.      Tonsils: No tonsillar exudate or tonsillar abscesses. 0 on the right. 0 on the left.        Comments: Tooth #19, there is a tooth decay and fragmentation gingival swelling and tenderness.   Eyes:      General:         Right eye: No discharge.         Left eye: No discharge.      Extraocular Movements: Extraocular movements intact.      Conjunctiva/sclera: Conjunctivae normal.      Pupils: Pupils are equal, round, and reactive to light.   Neck:      Vascular: No carotid bruit.   Cardiovascular:      Rate and Rhythm: Normal rate and regular rhythm.      Heart sounds: Normal heart sounds. No murmur heard.     No friction rub. No gallop.   Pulmonary:      Effort: Pulmonary effort is normal. No respiratory distress.      Breath sounds: Normal breath sounds. No stridor. No wheezing, rhonchi or rales.   Chest:      Chest wall: No tenderness.   Abdominal:      General: Bowel sounds are normal. There is no distension.      Palpations: Abdomen is soft. There is no mass.      Tenderness: There is no abdominal tenderness. There is no right CVA tenderness, left CVA tenderness, guarding or rebound.   Musculoskeletal:         General: No tenderness or deformity. Normal range of motion.      Cervical back: Normal range of motion and neck supple. No rigidity or tenderness.   Lymphadenopathy:      Cervical: No cervical adenopathy.   Skin:     General: Skin is warm and dry.      Capillary Refill: Capillary refill takes less than 2 seconds.   Neurological:      Mental Status: She is alert and oriented to person, place, and time.   Psychiatric:         Behavior: Behavior normal.         Vital Signs  ED Triage Vitals [05/01/24 0745]   Temperature Pulse Respirations Blood Pressure SpO2   99.3 °F  (37.4 °C) 100 16 (!) 216/98 98 %      Temp Source Heart Rate Source Patient Position - Orthostatic VS BP Location FiO2 (%)   Oral Monitor Lying Left arm --      Pain Score       2           Vitals:    05/01/24 0945 05/01/24 1000 05/01/24 1030 05/01/24 1045   BP: 155/69 133/59 134/65 133/61   Pulse: 85 80 77 77   Patient Position - Orthostatic VS: Lying Lying Lying Lying         Visual Acuity      ED Medications  Medications   labetalol (NORMODYNE) injection 10 mg (10 mg Intravenous Given 5/1/24 0828)   diphenhydrAMINE (BENADRYL) injection 25 mg (25 mg Intravenous Given 5/1/24 0828)   metoclopramide (REGLAN) injection 10 mg (10 mg Intravenous Given 5/1/24 0828)   amoxicillin (AMOXIL) capsule 500 mg (500 mg Oral Given 5/1/24 0828)   oxyCODONE-acetaminophen (PERCOCET) 5-325 mg per tablet 1 tablet (1 tablet Oral Given 5/1/24 0918)       Diagnostic Studies  Results Reviewed       Procedure Component Value Units Date/Time    Comprehensive metabolic panel [[id number]]  (Abnormal) Collected: 05/01/24 0758    Lab Status: Final result Specimen: Blood from Arm, Right Updated: 05/01/24 0910     Sodium 139 mmol/L      Potassium 3.6 mmol/L      Chloride 100 mmol/L      CO2 26 mmol/L      ANION GAP 13 mmol/L      BUN 10 mg/dL      Creatinine 0.66 mg/dL      Glucose 150 mg/dL      Calcium 10.1 mg/dL      AST 29 U/L      ALT 40 U/L      Alkaline Phosphatase 115 U/L      Total Protein 8.1 g/dL      Albumin 4.5 g/dL      Total Bilirubin 0.39 mg/dL      eGFR 99 ml/min/1.73sq m     Narrative:      National Kidney Disease Foundation guidelines for Chronic Kidney Disease (CKD):     Stage 1 with normal or high GFR (GFR > 90 mL/min/1.73 square meters)    Stage 2 Mild CKD (GFR = 60-89 mL/min/1.73 square meters)    Stage 3A Moderate CKD (GFR = 45-59 mL/min/1.73 square meters)    Stage 3B Moderate CKD (GFR = 30-44 mL/min/1.73 square meters)    Stage 4 Severe CKD (GFR = 15-29 mL/min/1.73 square meters)    Stage 5 End Stage CKD (GFR <15  mL/min/1.73 square meters)  Note: GFR calculation is accurate only with a steady state creatinine    FLU/RSV/COVID - if FLU/RSV clinically relevant [[id number]]  (Normal) Collected: 05/01/24 0812    Lab Status: Final result Specimen: Nares from Nose Updated: 05/01/24 0907     SARS-CoV-2 Negative     INFLUENZA A PCR Negative     INFLUENZA B PCR Negative     RSV PCR Negative    Narrative:      FOR PEDIATRIC PATIENTS - copy/paste COVID Guidelines URL to browser: [email or web address]    SARS-CoV-2 assay is a Nucleic Acid Amplification assay intended for the  qualitative detection of nucleic acid from SARS-CoV-2 in nasopharyngeal  swabs. Results are for the presumptive identification of SARS-CoV-2 RNA.    Positive results are indicative of infection with SARS-CoV-2, the virus  causing COVID-19, but do not rule out bacterial infection or co-infection  with other viruses. Laboratories within the [place] and its  territories are required to report all positive results to the appropriate  public health authorities. Negative results do not preclude SARS-CoV-2  infection and should not be used as the sole basis for treatment or other  patient management decisions. Negative results must be combined with  clinical observations, patient history, and epidemiological information.  This test has not been FDA cleared or approved.    This test has been authorized by FDA under an Emergency Use Authorization  (EUA). This test is only authorized for the duration of time the  declaration that circumstances exist justifying the authorization of the  emergency use of an in vitro diagnostic tests for detection of SARS-CoV-2  virus and/or diagnosis of COVID-19 infection under section 564(b)(1) of  the Act, 21 U.S.C. 360bbb-3(b)(1), unless the authorization is terminated  or revoked sooner. The test has been validated but independent review by FDA  and CLIA is pending.    Test  performed using [employer] Gene[employer]pert: This RT-PCR assay targets N2,  a region unique to SARS-CoV-2. A conserved region in the E-gene was chosen  for pan-Sarbecovirus detection which includes SARS-CoV-2.    According to CMS-2020-01-R, this platform meets the definition of high-throughput technology.    HS Troponin 0hr (reflex protocol) [[id number]]  (Normal) Collected: 05/01/24 0758    Lab Status: Final result Specimen: Blood from Arm, Right Updated: 05/01/24 0837     hs TnI 0hr 3 ng/L     CBC and differential [[id number]]  (Abnormal) Collected: 05/01/24 0758    Lab Status: Final result Specimen: Blood from Arm, Right Updated: 05/01/24 0805     WBC 11.21 Thousand/uL      RBC 4.37 Million/uL      Hemoglobin 12.3 g/dL      Hematocrit 38.8 %      MCV 89 fL      MCH 28.1 pg      MCHC 31.7 g/dL      RDW 13.9 %      MPV 9.0 fL      Platelets 418 Thousands/uL      nRBC 0 /100 WBCs      Segmented % 72 %      Immature Grans % 0 %      Lymphocytes % 19 %      Monocytes % 8 %      Eosinophils Relative 1 %      Basophils Relative 0 %      Absolute Neutrophils 8.01 Thousands/µL      Absolute Immature Grans 0.05 Thousand/uL      Absolute Lymphocytes 2.15 Thousands/µL      Absolute Monocytes 0.84 Thousand/µL      Eosinophils Absolute 0.13 Thousand/µL      Basophils Absolute 0.03 Thousands/µL                    CT head wo contrast   Final Result by Rakan Lewis DO (05/01 0859)      No acute intracranial abnormality.                  Workstation performed: [id number]         XR chest 1 view portable   ED Interpretation by Delores Rodriguez MD (05/01 1120)   CXR; NO acute cardiopulmonary findings      Final Result by Dav Aden MD (05/02 0713)      No acute cardiopulmonary disease.            Workstation performed: [id number]                    Procedures  ECG 12 Lead Documentation Only    Date/Time: 5/1/2024 11:14 AM    Performed by: Delores Rodriguez MD  Authorized by: Delores Rodriguez MD    Indications /  Diagnosis:  Weakness headache.  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            ED Course                               SBIRT 22yo+      Flowsheet Row Most Recent Value   Initial Alcohol Screen: US AUDIT-C     1. How often do you have a drink containing alcohol? 0 Filed at: 05/01/2024 0748   2. How many drinks containing alcohol do you have on a typical day you are drinking?  1 Filed at: 05/01/2024 0748   3a. Male UNDER 65: How often do you have five or more drinks on one occasion? 0 Filed at: 05/01/2024 0748   3b. FEMALE Any Age, or MALE 65+: How often do you have 4 or more drinks on one occassion? 0 Filed at: 05/01/2024 0748   Audit-C Score 1 Filed at: 05/01/2024 0748   MUMTAZ: How many times in the past year have you...    Used an illegal drug or used a prescription medication for non-medical reasons? Never Filed at: 05/01/2024 0748                      Medical Decision Making  This is a 58 years old came for having headache, hypertension, and she does not feel well.  Patient has history of migraine headache and she is on Imitrex last time she took it 2 days ago.  Patient denies neck pain or stiffness and denies fever.  Patient is too sick on the left lower raw.  Has no CP, SOB, abdominal pain.  Patient has recent right knee replacement about 4 months ago and she has pain.  On the arrival to the ER patient has /100.  Patient she is on metoprolol 50 mg twice daily.  Physical exam shows no positive  pertinent findings.  Neurologically she is intact and no focal deficits. Has infection at tooth #19, and fragmentation of the tooth, and swollen gingiva.  Reviewing CBC, CMP are within normal limits.  CT of the head shows no intracranial acute findings.  Patient received labetalol 10 mg and BP goes down to 133/61.  Patient tested for COVID/flu/RSV came back undetected.  Patient given medication for the headache including Benadryl, Reglan, Percocet also for the knee pain.  Patient felt better and she wants to go home.  All question concerns of patient have been fully addressed.    Amount and/or Complexity of Data Reviewed  Labs: ordered.     Details: CBC, CMP are within normal limits.  COVID/flu/RSV are undetected.  Radiology: ordered and independent interpretation performed.     Details: CT head shows no acute intracranial findings.  CXR no acute cardiopulmonary findings.  ECG/medicine tests: ordered and independent interpretation performed.     Details: EKG; sinus tach rate 105, old anteroseptal infarction.    Risk  Prescription drug management.             Disposition  Final diagnoses:   Nonintractable headache, unspecified chronicity pattern, unspecified headache type   Hypertension   Dental infection     Time reflects when diagnosis was documented in both MDM as applicable and the Disposition within this note       Time User Action Codes Description Comment    5/1/2024 11:16 AM Delores Rodriguez Add [R51.9] Nonintractable headache, unspecified chronicity pattern, unspecified headache type     5/1/2024 11:16 AM Delores Rodriguez Add [I10] Hypertension     5/1/2024 11:16 AM Delores Rodriguez Add [K04.7] Dental infection           ED Disposition       ED Disposition   Discharge    Condition   Stable    Date/Time   Wed May 1, 2024 1121    Comment   Bhavana Smith discharge to home/self care.                   Follow-up Information       Follow up With Specialties Details Why Contact Info    YONG Cook  Family Medicine, Nurse Practitioner In 3 days  [address] [facility] [address]  [address]  [place] [address]  [phone]      [facility]  In 3 days  [address] [place] [id number]  [phone]            Discharge Medication List as of 5/1/2024 11:21 AM        START taking these medications    Details   amoxicillin (AMOXIL) 500 mg capsule Take 1 capsule (500 mg total) by mouth 3 (three) times a day for 7 days, Starting Wed 5/1/2024, Until Wed 5/8/2024, Normal      ascorbic acid (VITAMIN C) 500 mg tablet TAKE 1 TABLET(500 MG) BY MOUTH TWICE DAILY, Normal      celecoxib (CeleBREX) 200 mg capsule TAKE 1 CAPSULE(200 MG) BY MOUTH TWICE DAILY, Normal      folic acid (FOLVITE) 1 mg tablet TAKE 1 TABLET BY MOUTH EVERY DAY, Starting Wed 5/1/2024, Normal      Multiple Vitamin (Multivitamin) TABS TAKE 1 TABLET BY MOUTH DAILY, Starting Wed 5/1/2024, Normal           CONTINUE these medications which have NOT CHANGED    Details   ergocalciferol (VITAMIN D2) 50,000 units TAKE 1 CAPSULE BY MOUTH ONCE  WEEKLY, Starting Thu 3/28/2024, Normal      ferrous sulfate 324 (65 Fe) mg TAKE 1 TABLET(324 MG) BY MOUTH TWICE DAILY BEFORE MEALS, Normal      gabapentin (NEURONTIN) 800 mg tablet TAKE 1 TABLET BY MOUTH 3 TIMES  DAILY, Normal      hydroCHLOROthiazide 12.5 mg tablet TAKE 1 TABLET BY MOUTH DAILY, Starting Mon 4/22/2024, Normal      levothyroxine 25 mcg tablet Take 1 tablet (25 mcg total) by mouth daily in the early morning, Starting Mon 2/5/2024, Normal      metFORMIN (GLUCOPHAGE) 1000 MG tablet TAKE 1 TABLET BY MOUTH TWICE  DAILY WITH MEALS, Starting Mon 4/22/2024, Normal      metoprolol tartrate (LOPRESSOR) 50 mg tablet TAKE 1 TABLET BY MOUTH EVERY 12  HOURS, Starting Fri 3/15/2024, Normal      Multiple Vitamins-Minerals (multivitamin with minerals) tablet Take 1 tablet by mouth daily, Starting Tue 3/5/2024, Normal      oxyCODONE (Roxicodone) 5 immediate release tablet Take 1 tablet (5 mg  total) by mouth every 4 (four) hours as needed for moderate pain or severe pain for up to 10 days Max Daily Amount: 30 mg, Starting Mon 4/29/2024, Until Thu 5/9/2024 at 2359, Normal      SUMAtriptan (IMITREX) 25 mg tablet TAKE 1 TABLET(25 MG) BY MOUTH DAILY AS NEEDED FOR MIGRAINE. DO NOT TAKE MORE THAN 3 DOSES IN 1 WEEK, Normal      traZODone (DESYREL) 100 mg tablet TAKE 2 TABLETS(200 MG) BY MOUTH DAILY AT BEDTIME, Starting Mon 1/22/2024, Normal      acetaminophen (TYLENOL) 500 mg tablet Take 2 tablets (1,000 mg total) by mouth every 8 (eight) hours, Starting Mon 4/1/2024, Normal      Alcohol Swabs 70 % PADS May substitute brand based on insurance coverage. Check glucose TID., Normal      aluminum-magnesium hydroxide-simethicone (MAALOX) 5484-8881-842 mg/30 mL suspension Take 30 mL by mouth every 4 (four) hours as needed for indigestion or heartburn (gas), Starting Wed 9/6/2023, Normal      ARIPiprazole (ABILIFY) 10 mg tablet Take 1 tablet (10 mg total) by mouth daily, Starting Tue 11/8/2022, Normal      aspirin (ECOTRIN LOW STRENGTH) 81 mg EC tablet Take 1 tablet (81 mg total) by mouth 2 (two) times a day, Starting Mon 4/1/2024, Normal      atorvastatin (LIPITOR) 20 mg tablet Take 1 tablet (20 mg total) by mouth daily, Starting Wed 1/25/2023, Normal      Blood Glucose Monitoring Suppl (OneTouch Verio Reflect) w/Device KIT May substitute brand based on insurance coverage. Check glucose TID., Normal      cholecalciferol (VITAMIN D3) 1,000 units tablet Take 2 tablets (2,000 Units total) by mouth daily, Starting Tue 3/5/2024, Normal      clonazePAM (KlonoPIN) 0.5 mg tablet Take 1 tablet (0.5 mg total) by mouth daily as needed for seizures, Starting Mon 4/29/2024, Normal      Diclofenac Sodium (VOLTAREN) 1 % Apply 2 g topically 4 (four) times a day, Starting Mon 2/5/2024, Normal      dicyclomine (BENTYL) 10 mg capsule Take 2 capsules (20 mg total) by mouth 3 (three) times a day as needed (Cramping), Starting Thu  10/19/2023, Normal      docusate sodium (COLACE) 100 mg capsule Take 1 capsule (100 mg total) by mouth 2 (two) times a day for 14 days, Starting Thu 3/23/2023, Until Tue 12/12/2023, Normal      escitalopram (LEXAPRO) 10 mg tablet TAKE 1 TABLET(10 MG) BY MOUTH DAILY, Normal      insulin glargine (LANTUS) 100 units/mL subcutaneous injection Inject 25 Units under the skin daily at bedtime, Starting Tue 11/8/2022, No Print      linaCLOtide 145 MCG CAPS Take 1 capsule (145 mcg total) by mouth in the morning, Starting Thu 11/2/2023, Until Tue 12/12/2023, Normal      !! Microlet Lancets MISC Starting Mon 1/3/2022, Historical Med      naloxone (NARCAN) 4 mg/0.1 mL nasal spray Administer 1 spray into a nostril. If no response after 2-3 minutes, give another dose in the other nostril using a new spray., Phone In      ondansetron (ZOFRAN-ODT) 4 mg disintegrating tablet Take 1 tablet (4 mg total) by mouth every 6 (six) hours as needed for nausea or vomiting, Starting Mon 4/1/2024, Normal      !! OneTouch Delica Lancets 33G MISC May substitute brand based on insurance coverage. Check glucose TID., Normal      pantoprazole (PROTONIX) 40 mg tablet Take 1 tablet (40 mg total) by mouth 2 (two) times a day, Starting Thu 11/2/2023, Until Wed 1/31/2024, Normal      polyethylene glycol (MIRALAX) 17 g packet Take 17 g by mouth daily, Starting Wed 9/6/2023, Normal      senna-docusate sodium (SENOKOT S) 8.6-50 mg per tablet Take 1 tablet by mouth daily, Starting Mon 4/1/2024, Normal      sucralfate (CARAFATE) 1 g tablet TAKE 1 TABLET BY MOUTH 4 TIMES  DAILY, Starting Wed 1/31/2024, Normal       !! - Potential duplicate medications found. Please discuss with provider.          No discharge procedures on file.    PDMP Review         Value Time User    PDMP Reviewed  Yes 4/29/2024  4:57 PM Rosemarie Bianchi PA-C            ED Provider  Electronically Signed by             Delores Rodriguez MD  05/02/24 0940

## 2024-05-01 NOTE — DISCHARGE INSTRUCTIONS
Take medication as prescribed  Follow-up with dental clinic.  Follow-up with your primary doctor.  Labs Reviewed   CBC AND DIFFERENTIAL - Abnormal       Result Value Ref Range Status    WBC 11.21 (*) 4.31 - 10.16 Thousand/uL Final    RBC 4.37  3.81 - 5.12 Million/uL Final    Hemoglobin 12.3  11.5 - 15.4 g/dL Final    Hematocrit 38.8  34.8 - 46.1 % Final    MCV 89  82 - 98 fL Final    MCH 28.1  26.8 - 34.3 pg Final    MCHC 31.7  31.4 - 37.4 g/dL Final    RDW 13.9  11.6 - 15.1 % Final    MPV 9.0  8.9 - 12.7 fL Final    Platelets 418 (*) 149 - 390 Thousands/uL Final    nRBC 0  /100 WBCs Final    Segmented % 72  43 - 75 % Final    Immature Grans % 0  0 - 2 % Final    Lymphocytes % 19  14 - 44 % Final    Monocytes % 8  4 - 12 % Final    Eosinophils Relative 1  0 - 6 % Final    Basophils Relative 0  0 - 1 % Final    Absolute Neutrophils 8.01 (*) 1.85 - 7.62 Thousands/µL Final    Absolute Immature Grans 0.05  0.00 - 0.20 Thousand/uL Final    Absolute Lymphocytes 2.15  0.60 - 4.47 Thousands/µL Final    Absolute Monocytes 0.84  0.17 - 1.22 Thousand/µL Final    Eosinophils Absolute 0.13  0.00 - 0.61 Thousand/µL Final    Basophils Absolute 0.03  0.00 - 0.10 Thousands/µL Final   COMPREHENSIVE METABOLIC PANEL - Abnormal    Sodium 139  135 - 147 mmol/L Final    Potassium 3.6  3.5 - 5.3 mmol/L Final    Chloride 100  96 - 108 mmol/L Final    CO2 26  21 - 32 mmol/L Final    ANION GAP 13  4 - 13 mmol/L Final    BUN 10  5 - 25 mg/dL Final    Creatinine 0.66  0.60 - 1.30 mg/dL Final    Comment: Standardized to IDMS reference method    Glucose 150 (*) 65 - 140 mg/dL Final    Comment: If the patient is fasting, the ADA then defines impaired fasting glucose as > 100 mg/dL and diabetes as > or equal to 123 mg/dL.    Calcium 10.1  8.4 - 10.2 mg/dL Final    AST 29  13 - 39 U/L Final    ALT 40  7 - 52 U/L Final    Comment: Specimen collection should occur prior to Sulfasalazine administration due to the potential for falsely depressed  results.     Alkaline Phosphatase 115 (*) 34 - 104 U/L Final    Total Protein 8.1  6.4 - 8.4 g/dL Final    Albumin 4.5  3.5 - 5.0 g/dL Final    Total Bilirubin 0.39  mg/dL Final    Comment: Use of this assay is not recommended for patients undergoing treatment with eltrombopag due to the potential for falsely elevated results.  N-acetyl-p-benzoquinone imine (metabolite of Acetaminophen) will generate erroneously low results in samples for patients that have taken an overdose of Acetaminophen.    eGFR 99  ml/min/1.73sq m Final    Narrative:     National Kidney Disease Foundation guidelines for Chronic Kidney Disease (CKD):     Stage 1 with normal or high GFR (GFR > 90 mL/min/1.73 square meters)    Stage 2 Mild CKD (GFR = 60-89 mL/min/1.73 square meters)    Stage 3A Moderate CKD (GFR = 45-59 mL/min/1.73 square meters)    Stage 3B Moderate CKD (GFR = 30-44 mL/min/1.73 square meters)    Stage 4 Severe CKD (GFR = 15-29 mL/min/1.73 square meters)    Stage 5 End Stage CKD (GFR <15 mL/min/1.73 square meters)  Note: GFR calculation is accurate only with a steady state creatinine   COVID19, INFLUENZA A/B, RSV PCR, [facility] - Normal    SARS-CoV-2 Negative  Negative Final    INFLUENZA A PCR Negative  Negative Final    INFLUENZA B PCR Negative  Negative Final    RSV PCR Negative  Negative Final    Narrative:     FOR PEDIATRIC PATIENTS - copy/paste COVID Guidelines URL to browser: [email or web address]    SARS-CoV-2 assay is a Nucleic Acid Amplification assay intended for the  qualitative detection of nucleic acid from SARS-CoV-2 in nasopharyngeal  swabs. Results are for the presumptive identification of SARS-CoV-2 RNA.    Positive results are indicative of infection with SARS-CoV-2, the virus  causing COVID-19, but do not rule out bacterial infection or co-infection  with other viruses. Laboratories within the [place] and its  territories are required to report all positive  "results to the appropriate  public health authorities. Negative results do not preclude SARS-CoV-2  infection and should not be used as the sole basis for treatment or other  patient management decisions. Negative results must be combined with  clinical observations, patient history, and epidemiological information.  This test has not been FDA cleared or approved.    This test has been authorized by FDA under an Emergency Use Authorization  (EUA). This test is only authorized for the duration of time the  declaration that circumstances exist justifying the authorization of the  emergency use of an in vitro diagnostic tests for detection of SARS-CoV-2  virus and/or diagnosis of COVID-19 infection under section 564(b)(1) of  the Act, 21 U.S.C. 360bbb-3(b)(1), unless the authorization is terminated  or revoked sooner. The test has been validated but independent review by FDA  and CLIA is pending.    Test performed using Cepheid GeneXpert: This RT-PCR assay targets N2,  a region unique to SARS-CoV-2. A conserved region in the E-gene was chosen  for pan-Sarbecovirus detection which includes SARS-CoV-2.    According to CMS-2020-01-R, this platform meets the definition of high-throughput technology.   HS TROPONIN I 0HR - Normal    hs TnI 0hr 3  \"Refer to ACS Flowchart\"- see link ng/L Final    Comment:                                              Initial (time 0) result  If >=50 ng/L, Myocardial injury suggested ;  Type of myocardial injury and treatment strategy  to be determined.  If 5-49 ng/L, a delta result at 2 hours and or 4 hours will be needed to further evaluate.  If <4 ng/L, and chest pain has been >3 hours since onset, patient may qualify for discharge based on the HEART score in the ED.  If <5 ng/L and <3hours since onset of chest pain, a delta result at 2 hours will be needed to further evaluate.    HS Troponin 99th Percentile URL of a Health Population=12 ng/L with a 95% Confidence Interval of 8-18 " ng/L.    Second Troponin (time 2 hours)  If calculated delta >= 20 ng/L,  Myocardial injury suggested ; Type of myocardial injury and treatment strategy to be determined.  If 5-49 ng/L and the calculated delta is 5-19 ng/L, consult medical service for evaluation.  Continue evaluation for ischemia on ecg and other possible etiology and repeat hs troponin at 4 hours.  If delta is <5 ng/L at 2 hours, consider discharge based on risk stratification via the HEART score (if in ED), or ELFEGO risk score in IP/Observation.    HS Troponin 99th Percentile URL of a Health Population=12 ng/L with a 95% Confidence Interval of 8-18 ng/L.   LIGHT BLUE TOP

## 2024-05-02 ENCOUNTER — APPOINTMENT (OUTPATIENT)
Dept: PHYSICAL THERAPY | Facility: REHABILITATION | Age: 59
End: 2024-05-02
Payer: COMMERCIAL

## 2024-05-02 ENCOUNTER — TELEPHONE (OUTPATIENT)
Age: 59
End: 2024-05-02

## 2024-05-02 LAB
ATRIAL RATE: 105 BPM
P AXIS: 61 DEGREES
PR INTERVAL: 180 MS
QRS AXIS: 31 DEGREES
QRSD INTERVAL: 70 MS
QT INTERVAL: 350 MS
QTC INTERVAL: 462 MS
T WAVE AXIS: 53 DEGREES
VENTRICULAR RATE: 105 BPM

## 2024-05-02 PROCEDURE — 93010 ELECTROCARDIOGRAM REPORT: CPT | Performed by: INTERNAL MEDICINE

## 2024-05-02 NOTE — TELEPHONE ENCOUNTER
Called and left message for Lavon to call back so we can schedule an appt for her with yen    normal...

## 2024-05-02 NOTE — TELEPHONE ENCOUNTER
Spoke with patients spouse, patients spouse is looking to schedule a f/u visit for the patient from the er for blood pressure  medication increase possible. Please advise.

## 2024-05-03 NOTE — PROGRESS NOTES
"Daily Note     Today's date: 2024  Patient name: Bhavana Smith  : 1965  MRN: [id number]  Referring provider: Rosemarie Bianchi PA-C  Dx:   Encounter Diagnosis     ICD-10-CM    1. Acute pain of right knee  M25.561       2. Status post right knee replacement  Z96.651           Start Time: 1450  Stop Time: 1535  Total time in clinic (min): 45 minutes    Subjective: Pt reports steady improvements in mobility and pain. She went to ED last week for HTN, she states it has been better since and she has been taking her her pain and BP medications.       Objective: See treatment diary below      Assessment: Tolerated treatment fair. Pt had significant increase in pain during SAQ, instructed pt in intermittent rest breaks and reduced difficulty to quad sets with improved tolerance to exercise observed. Pt initiated resisted TKE with verbal cues to maintain heel contact and prevent glute compensations with good VMO activation noted. Patient demonstrated fatigue post treatment and would benefit from continued PT      Plan: Continue per plan of care.      Precautions: Anxiety, Bipolar disorder, Depression, Diabetes type 2, Disease of thyroid gland, Hypertension, PTSD, Seizure, Substance abuse (HCC), Suicide attempt ([facility]), LATEX ALLERGY; R TKA on   *HEP: 48NXPTMQ     Manuals              STM                  PROM (flex & ext)   done  done                                                     Neuro Re-Ed                   Pt education Prognosis, diagnosis, HEP                 Bridges nv 2x4 2x10             Clamshells            Quad set 5\"x20 5\"x20 5\"x20             SLS                  HS stretch nv Seated 20\"x3 Seated 20\"x3             Calf stretch nv Seated 20\"x3 Seated 20\"x3        SLR  nv attempted               Heel slides 2x10  5\"x20 5\"x30             Ther Ex                   LAQ nv 4x5 4x5             SAQ nv 4x5 2x5              Standing HS curls                   Bike (ROM) nv   nv           " "  Resisted TKE     Pink 5\"x20                                 Ther Activity                   STS  nv 2 foam, 3x5 1 foam, 2x10             Side steps                   Step ups (FW)                   Step ups (LAT)                   Standing HR  nv 3x10 3x10             Modalities                   cryotherapy  10' post Declined, will do at home                                           "

## 2024-05-06 ENCOUNTER — OFFICE VISIT (OUTPATIENT)
Dept: PHYSICAL THERAPY | Facility: REHABILITATION | Age: 59
End: 2024-05-06
Payer: COMMERCIAL

## 2024-05-06 DIAGNOSIS — M25.561 ACUTE PAIN OF RIGHT KNEE: Primary | ICD-10-CM

## 2024-05-06 DIAGNOSIS — Z96.651 STATUS POST RIGHT KNEE REPLACEMENT: ICD-10-CM

## 2024-05-06 PROCEDURE — 97112 NEUROMUSCULAR REEDUCATION: CPT

## 2024-05-06 PROCEDURE — 97140 MANUAL THERAPY 1/> REGIONS: CPT

## 2024-05-06 PROCEDURE — 97110 THERAPEUTIC EXERCISES: CPT

## 2024-05-07 ENCOUNTER — VBI (OUTPATIENT)
Dept: ADMINISTRATIVE | Facility: OTHER | Age: 59
End: 2024-05-07

## 2024-05-07 NOTE — TELEPHONE ENCOUNTER
05/07/24 9:16 AM     VB [employer] SmartForm used to document caregap status.    Chantale Holland MA

## 2024-05-08 LAB
ALBUMIN SERPL BCP-MCNC: 4.5 G/DL (ref 3.5–5)
ALP SERPL-CCNC: 115 U/L (ref 34–104)
ALT SERPL W P-5'-P-CCNC: 40 U/L (ref 7–52)
ANION GAP SERPL CALCULATED.3IONS-SCNC: 13 MMOL/L (ref 4–13)
AST SERPL W P-5'-P-CCNC: 29 U/L (ref 13–39)
BILIRUB SERPL-MCNC: 0.39 MG/DL (ref 0.2–1)
BUN SERPL-MCNC: 10 MG/DL (ref 5–25)
CALCIUM SERPL-MCNC: 10.1 MG/DL (ref 8.4–10.2)
CHLORIDE SERPL-SCNC: 100 MMOL/L (ref 96–108)
CO2 SERPL-SCNC: 26 MMOL/L (ref 21–32)
CREAT SERPL-MCNC: 0.66 MG/DL (ref 0.6–1.3)
GFR SERPL CREATININE-BSD FRML MDRD: 99 ML/MIN/1.73SQ M
GLUCOSE SERPL-MCNC: 150 MG/DL (ref 65–140)
POTASSIUM SERPL-SCNC: 3.6 MMOL/L (ref 3.5–5.3)
PROT SERPL-MCNC: 8.1 G/DL (ref 6.4–8.4)
SODIUM SERPL-SCNC: 139 MMOL/L (ref 135–147)

## 2024-05-08 NOTE — PROGRESS NOTES
"Daily Note     Today's date: 2024  Patient name: Bhavana Smith  : 1965  MRN: [id number]  Referring provider: Rosemarie Bianchi PA-C  Dx:   Encounter Diagnosis     ICD-10-CM    1. Acute pain of right knee  M25.561       2. Status post right knee replacement  Z96.651           Start Time: 1400  Stop Time: 1445  Total time in clinic (min): 45 minutes    Subjective: Pt reports being unable to sleep last night from increased lateral knee pain.       Objective: See treatment diary below      Assessment: Tolerated treatment fair. Pt is a poor historian and has difficulty describing pain until upon completion of exercise. Provided pt education on pain management and not increasing pain to sharp shooting during therapeutic exercises with fair understanding due to increased persistence the exercises help. Instructed pt in intermittent rest breaks to prevent exacerbation of symptoms with improved tolerance to session. Upon completion of session pt demonstrated good tolerance and would continue to benefit from PT address impaired mobility and strength of R knee.       Plan: Continue per plan of care.      Precautions: Anxiety, Bipolar disorder, Depression, Diabetes type 2, Disease of thyroid gland, Hypertension, PTSD, Seizure, Substance abuse (HCC), Suicide attempt (HCC), LATEX ALLERGY; R TKA on   *HEP: 48NXPTMQ     Manuals            STM                  PROM (flex & ext)   done  done done                                                   Neuro Re-Ed                   Pt education Prognosis, diagnosis, HEP                 Bridges nv 2x4 2x10 2x10           Clamshells            Quad set 5\"x20 5\"x20 5\"x20 5\"x20           SLS                  HS stretch nv Seated 20\"x3 Seated 20\"x3 Seated 20\"x2           Calf stretch nv Seated 20\"x3 Seated 20\"x3 Seated 20\"x2       SLR  nv attempted               Heel slides 2x10  5\"x20 5\"x30 5\"x20           Ther Ex                   LAQ nv 4x5 4x5 4x5         " "  SAQ nv 4x5 2x5  np           Standing HS curls                   Bike (ROM) nv   nv 5' rocking           Resisted TKE     Pink 5\"x20  Pink 5\"x20                               Ther Activity                   STS  nv 2 foam, 3x5 1 foam, 2x10  1 foam, 2x10           Side steps                   Step ups (FW)                   Step ups (LAT)                   Standing HR  nv 3x10 3x10 3x10           Modalities                   cryotherapy  10' post Declined, will do at home Tolerated 5'            Gait training       performed                        "

## 2024-05-09 ENCOUNTER — OFFICE VISIT (OUTPATIENT)
Dept: PHYSICAL THERAPY | Facility: REHABILITATION | Age: 59
End: 2024-05-09
Payer: COMMERCIAL

## 2024-05-09 ENCOUNTER — NURSE TRIAGE (OUTPATIENT)
Dept: OTHER | Facility: OTHER | Age: 59
End: 2024-05-09

## 2024-05-09 DIAGNOSIS — Z96.651 STATUS POST RIGHT KNEE REPLACEMENT: ICD-10-CM

## 2024-05-09 DIAGNOSIS — M25.561 ACUTE PAIN OF RIGHT KNEE: Primary | ICD-10-CM

## 2024-05-09 DIAGNOSIS — M17.11 PRIMARY OSTEOARTHRITIS OF RIGHT KNEE: ICD-10-CM

## 2024-05-09 PROCEDURE — 97110 THERAPEUTIC EXERCISES: CPT

## 2024-05-09 PROCEDURE — 97140 MANUAL THERAPY 1/> REGIONS: CPT

## 2024-05-09 PROCEDURE — 97112 NEUROMUSCULAR REEDUCATION: CPT

## 2024-05-09 RX ORDER — TOPIRAMATE 25 MG/1
25 TABLET ORAL 2 TIMES DAILY
COMMUNITY
Start: 2024-03-14

## 2024-05-09 RX ORDER — BUPROPION HYDROCHLORIDE 150 MG/1
150 TABLET ORAL EVERY MORNING
COMMUNITY
Start: 2024-03-14

## 2024-05-09 RX ORDER — ONDANSETRON 4 MG/1
4 TABLET, FILM COATED ORAL EVERY 8 HOURS PRN
COMMUNITY
Start: 2024-04-03

## 2024-05-09 NOTE — TELEPHONE ENCOUNTER
"Reason for Disposition   Caller requesting a CONTROLLED substance prescription refill (e.g., narcotics, ADHD medicines)    Answer Assessment - Initial Assessment Questions  1. DRUG NAME: \"What medicine do you need to have refilled?\"      oxyCODONE (Roxicodone) 5 immediate release tablet    2. REFILLS REMAINING: \"How many refills are remaining?\" (Note: The label on the medicine or pill bottle will show how many refills are remaining. If there are no refills remaining, then a renewal may be needed.)      0    3. EXPIRATION DATE: \"What is the expiration date?\" (Note: The label states when the prescription will , and thus can no longer be refilled.)      N/A    4. PRESCRIBING HCP: \"Who prescribed it?\" Reason: If prescribed by specialist, call should be referred to that group.      Rosemarie Bianchi PA-C    Protocols used: Medication Refill and Renewal Call-ADULT-        Patient declined clinical triage at this time. Advised to call office again tomorrow during office hours to get medication refilled. Patient verbalized understanding.   "

## 2024-05-09 NOTE — TELEPHONE ENCOUNTER
Reason for call:   [x] Refill   [] Prior Auth  [] Other:     Office:   [] PCP/Provider -   [] Specialty/Provider -     Medication: Oxycodone 5 mg Take 1 tablet by mouth every 4 hours as needed for pain    Pharmacy: [facility]    Does the patient have enough for 3 days?   [] Yes   [x] No - Send as HP to POD

## 2024-05-10 RX ORDER — OXYCODONE HYDROCHLORIDE 5 MG/1
5 TABLET ORAL EVERY 4 HOURS PRN
Qty: 42 TABLET | Refills: 0 | Status: SHIPPED | OUTPATIENT
Start: 2024-05-10 | End: 2024-05-20

## 2024-05-13 ENCOUNTER — APPOINTMENT (OUTPATIENT)
Dept: PHYSICAL THERAPY | Facility: REHABILITATION | Age: 59
End: 2024-05-13
Payer: COMMERCIAL

## 2024-05-13 ENCOUNTER — OFFICE VISIT (OUTPATIENT)
Dept: FAMILY MEDICINE CLINIC | Facility: CLINIC | Age: 59
End: 2024-05-13
Payer: COMMERCIAL

## 2024-05-13 VITALS
RESPIRATION RATE: 17 BRPM | WEIGHT: 171 LBS | HEART RATE: 73 BPM | BODY MASS INDEX: 30.3 KG/M2 | HEIGHT: 63 IN | TEMPERATURE: 97.3 F | SYSTOLIC BLOOD PRESSURE: 134 MMHG | OXYGEN SATURATION: 95 % | DIASTOLIC BLOOD PRESSURE: 90 MMHG

## 2024-05-13 DIAGNOSIS — E11.69 TYPE 2 DIABETES MELLITUS WITH OBESITY  (HCC): ICD-10-CM

## 2024-05-13 DIAGNOSIS — G89.18 POST-OP PAIN: ICD-10-CM

## 2024-05-13 DIAGNOSIS — J43.9 PULMONARY EMPHYSEMA, UNSPECIFIED EMPHYSEMA TYPE (HCC): ICD-10-CM

## 2024-05-13 DIAGNOSIS — F13.20 BENZODIAZEPINE DEPENDENCE, CONTINUOUS (HCC): ICD-10-CM

## 2024-05-13 DIAGNOSIS — F43.12 POST-TRAUMATIC STRESS DISORDER, CHRONIC: Chronic | ICD-10-CM

## 2024-05-13 DIAGNOSIS — Z00.00 MEDICARE ANNUAL WELLNESS VISIT, SUBSEQUENT: ICD-10-CM

## 2024-05-13 DIAGNOSIS — Z98.890 S/P KNEE SURGERY: ICD-10-CM

## 2024-05-13 DIAGNOSIS — Z12.31 BREAST CANCER SCREENING BY MAMMOGRAM: ICD-10-CM

## 2024-05-13 DIAGNOSIS — E78.2 MIXED HYPERLIPIDEMIA: ICD-10-CM

## 2024-05-13 DIAGNOSIS — E66.9 TYPE 2 DIABETES MELLITUS WITH OBESITY  (HCC): ICD-10-CM

## 2024-05-13 DIAGNOSIS — G89.29 CHRONIC PAIN OF RIGHT KNEE: ICD-10-CM

## 2024-05-13 DIAGNOSIS — F41.1 GENERALIZED ANXIETY DISORDER: Chronic | ICD-10-CM

## 2024-05-13 DIAGNOSIS — F41.9 ANXIETY: Primary | ICD-10-CM

## 2024-05-13 DIAGNOSIS — M25.561 CHRONIC PAIN OF RIGHT KNEE: ICD-10-CM

## 2024-05-13 DIAGNOSIS — F31.9 BIPOLAR 1 DISORDER, DEPRESSED (HCC): ICD-10-CM

## 2024-05-13 PROBLEM — I16.0 HYPERTENSIVE URGENCY: Status: RESOLVED | Noted: 2022-03-28 | Resolved: 2024-05-13

## 2024-05-13 PROBLEM — R11.2 NAUSEA AND VOMITING: Status: RESOLVED | Noted: 2018-05-17 | Resolved: 2024-05-13

## 2024-05-13 PROBLEM — R10.9 ABDOMINAL PAIN: Status: RESOLVED | Noted: 2018-05-16 | Resolved: 2024-05-13

## 2024-05-13 PROBLEM — F31.30 BIPOLAR DISORDER CURRENT EPISODE DEPRESSED (HCC): Status: RESOLVED | Noted: 2020-05-12 | Resolved: 2024-05-13

## 2024-05-13 PROCEDURE — 99214 OFFICE O/P EST MOD 30 MIN: CPT | Performed by: NURSE PRACTITIONER

## 2024-05-13 PROCEDURE — G0439 PPPS, SUBSEQ VISIT: HCPCS | Performed by: NURSE PRACTITIONER

## 2024-05-13 RX ORDER — ESCITALOPRAM OXALATE 10 MG/1
10 TABLET ORAL DAILY
Qty: 90 TABLET | Refills: 1 | Status: SHIPPED | OUTPATIENT
Start: 2024-05-13

## 2024-05-13 RX ORDER — ATORVASTATIN CALCIUM 20 MG/1
20 TABLET, FILM COATED ORAL DAILY
Qty: 90 TABLET | Refills: 1 | Status: SHIPPED | OUTPATIENT
Start: 2024-05-13

## 2024-05-13 NOTE — PATIENT INSTRUCTIONS
Medicare Preventive Visit Patient Instructions  Thank you for completing your Welcome to Medicare Visit or Medicare Annual Wellness Visit today. Your next wellness visit will be due in one year (5/14/2025).  The screening/preventive services that you may require over the next 5-10 years are detailed below. Some tests may not apply to you based off risk factors and/or age. Screening tests ordered at today's visit but not completed yet may show as past due. Also, please note that scanned in results may not display below.  Preventive Screenings:  Service Recommendations Previous Testing/Comments   Colorectal Cancer Screening  * Colonoscopy    * Fecal Occult Blood Test (FOBT)/Fecal Immunochemical Test (FIT)  * Fecal DNA/Cologuard Test  * Flexible Sigmoidoscopy Age: 45-75 years old   Colonoscopy: every 10 years (may be performed more frequently if at higher risk)  OR  FOBT/FIT: every 1 year  OR  Cologuard: every 3 years  OR  Sigmoidoscopy: every 5 years  Screening may be recommended earlier than age 45 if at higher risk for colorectal cancer. Also, an individualized decision between you and your healthcare provider will decide whether screening between the ages of 76-85 would be appropriate. Colonoscopy: 03/22/2023  FOBT/FIT: Not on file  Cologuard: Not on file  Sigmoidoscopy: Not on file          Breast Cancer Screening Age: 40+ years old  Frequency: every 1-2 years  Not required if history of left and right mastectomy Mammogram: Not on file        Cervical Cancer Screening Between the ages of 21-29, pap smear recommended once every 3 years.   Between the ages of 30-65, can perform pap smear with HPV co-testing every 5 years.   Recommendations may differ for women with a history of total hysterectomy, cervical cancer, or abnormal pap smears in past. Pap Smear: Not on file        Hepatitis C Screening Once for adults born between 1945 and 1965  More frequently in patients at high risk for Hepatitis C Hep C Antibody:  03/24/2021        Diabetes Screening 1-2 times per year if you're at risk for diabetes or have pre-diabetes Fasting glucose: 94 mg/dL (10/16/2023)  A1C: 5.8 % (3/7/2024)      Cholesterol Screening Once every 5 years if you don't have a lipid disorder. May order more often based on risk factors. Lipid panel: 09/03/2023          Other Preventive Screenings Covered by Medicare:  Abdominal Aortic Aneurysm (AAA) Screening: covered once if your at risk. You're considered to be at risk if you have a family history of AAA.  Lung Cancer Screening: covers low dose CT scan once per year if you meet all of the following conditions: (1) Age 55-77; (2) No signs or symptoms of lung cancer; (3) Current smoker or have quit smoking within the last 15 years; (4) You have a tobacco smoking history of at least 20 pack years (packs per day multiplied by number of years you smoked); (5) You get a written order from a healthcare provider.  Glaucoma Screening: covered annually if you're considered high risk: (1) You have diabetes OR (2) Family history of glaucoma OR (3)  aged 50 and older OR (4)  American aged 65 and older  Osteoporosis Screening: covered every 2 years if you meet one of the following conditions: (1) You're estrogen deficient and at risk for osteoporosis based off medical history and other findings; (2) Have a vertebral abnormality; (3) On glucocorticoid therapy for more than 3 months; (4) Have primary hyperparathyroidism; (5) On osteoporosis medications and need to assess response to drug therapy.   Last bone density test (DXA Scan): Not on file.  HIV Screening: covered annually if you're between the age of 15-65. Also covered annually if you are younger than 15 and older than 65 with risk factors for HIV infection. For pregnant patients, it is covered up to 3 times per pregnancy.    Immunizations:  Immunization Recommendations   Influenza Vaccine Annual influenza vaccination during flu season is  recommended for all persons aged >= 6 months who do not have contraindications   Pneumococcal Vaccine   * Pneumococcal conjugate vaccine = PCV13 (Prevnar 13), PCV15 (Vaxneuvance), PCV20 (Prevnar 20)  * Pneumococcal polysaccharide vaccine = PPSV23 (Pneumovax) Adults 19-65 yo with certain risk factors or if 65+ yo  If never received any pneumonia vaccine: recommend Prevnar 20 (PCV20)  Give PCV20 if previously received 1 dose of PCV13 or PPSV23   Hepatitis B Vaccine 3 dose series if at intermediate or high risk (ex: diabetes, end stage renal disease, liver disease)   Respiratory syncytial virus (RSV) Vaccine - COVERED BY MEDICARE PART D  * RSVPreF3 (Arexvy) CDC recommends that adults 60 years of age and older may receive a single dose of RSV vaccine using shared clinical decision-making (SCDM)   Tetanus (Td) Vaccine - COST NOT COVERED BY MEDICARE PART B Following completion of primary series, a booster dose should be given every 10 years to maintain immunity against tetanus. Td may also be given as tetanus wound prophylaxis.   Tdap Vaccine - COST NOT COVERED BY MEDICARE PART B Recommended at least once for all adults. For pregnant patients, recommended with each pregnancy.   Shingles Vaccine (Shingrix) - COST NOT COVERED BY MEDICARE PART B  2 shot series recommended in those 19 years and older who have or will have weakened immune systems or those 50 years and older     Health Maintenance Due:      Topic Date Due   • Breast Cancer Screening: Mammogram  Never done   • Lung Cancer Screening  01/21/2027 (Originally 3/28/2023)   • Colorectal Cancer Screening  03/20/2028   • HIV Screening  Completed   • Hepatitis C Screening  Completed     Immunizations Due:      Topic Date Due   • Hepatitis A Vaccine (1 of 2 - Risk 2-dose series) Never done   • COVID-19 Vaccine (2 - 2023-24 season) 09/01/2023     Advance Directives   What are advance directives?  Advance directives are legal documents that state your wishes and plans for  medical care. These plans are made ahead of time in case you lose your ability to make decisions for yourself. Advance directives can apply to any medical decision, such as the treatments you want, and if you want to donate organs.   What are the types of advance directives?  There are many types of advance directives, and each state has rules about how to use them. You may choose a combination of any of the following:  Living will:  This is a written record of the treatment you want. You can also choose which treatments you do not want, which to limit, and which to stop at a certain time. This includes surgery, medicine, IV fluid, and tube feedings.   Durable power of  for healthcare (DPAHC):  This is a written record that states who you want to make healthcare choices for you when you are unable to make them for yourself. This person, called a proxy, is usually a family member or a friend. You may choose more than 1 proxy.  Do not resuscitate (DNR) order:  A DNR order is used in case your heart stops beating or you stop breathing. It is a request not to have certain forms of treatment, such as CPR. A DNR order may be included in other types of advance directives.  Medical directive:  This covers the care that you want if you are in a coma, near death, or unable to make decisions for yourself. You can list the treatments you want for each condition. Treatment may include pain medicine, surgery, blood transfusions, dialysis, IV or tube feedings, and a ventilator (breathing machine).  Values history:  This document has questions about your views, beliefs, and how you feel and think about life. This information can help others choose the care that you would choose.  Why are advance directives important?  An advance directive helps you control your care. Although spoken wishes may be used, it is better to have your wishes written down. Spoken wishes can be misunderstood, or not followed. Treatments may be given  even if you do not want them. An advance directive may make it easier for your family to make difficult choices about your care.   Cigarette Smoking and Your Health   Risks to your health if you smoke:  Nicotine and other chemicals found in tobacco damage every cell in your body. Even if you are a light smoker, you have an increased risk for cancer, heart disease, and lung disease. If you are pregnant or have diabetes, smoking increases your risk for complications.   Benefits to your health if you stop smoking:   You decrease respiratory symptoms such as coughing, wheezing, and shortness of breath.   You reduce your risk for cancers of the lung, mouth, throat, kidney, bladder, pancreas, stomach, and cervix. If you already have cancer, you increase the benefits of chemotherapy. You also reduce your risk for cancer returning or a second cancer from developing.   You reduce your risk for heart disease, blood clots, heart attack, and stroke.   You reduce your risk for lung infections, and diseases such as pneumonia, asthma, chronic bronchitis, and emphysema.  Your circulation improves. More oxygen can be delivered to your body. If you have diabetes, you lower your risk for complications, such as kidney, artery, and eye diseases. You also lower your risk for nerve damage. Nerve damage can lead to amputations, poor vision, and blindness.  You improve your body's ability to heal and to fight infections.  For more information and support to stop smoking:   [employer]  Phone: 2- 990 - 031-5654  Web Address: www.[employer].It  Weight Management   Why it is important to manage your weight:  Being overweight increases your risk of health conditions such as heart disease, high blood pressure, type 2 diabetes, and certain types of cancer. It can also increase your risk for osteoarthritis, sleep apnea, and other respiratory problems. Aim for a slow, steady weight loss. Even a small amount of weight loss can lower your risk of  health problems.  How to lose weight safely:  A safe and healthy way to lose weight is to eat fewer calories and get regular exercise. You can lose up about 1 pound a week by decreasing the number of calories you eat by 500 calories each day.   Healthy meal plan for weight management:  A healthy meal plan includes a variety of foods, contains fewer calories, and helps you stay healthy. A healthy meal plan includes the following:  Eat whole-grain foods more often.  A healthy meal plan should contain fiber. Fiber is the part of grains, fruits, and vegetables that is not broken down by your body. Whole-grain foods are healthy and provide extra fiber in your diet. Some examples of whole-grain foods are whole-wheat breads and pastas, oatmeal, brown rice, and bulgur.  Eat a variety of vegetables every day.  Include dark, leafy greens such as spinach, kale, edenilson greens, and mustard greens. Eat yellow and orange vegetables such as carrots, sweet potatoes, and winter squash.   Eat a variety of fruits every day.  Choose fresh or canned fruit (canned in its own juice or light syrup) instead of juice. Fruit juice has very little or no fiber.  Eat low-fat dairy foods.  Drink fat-free (skim) milk or 1% milk. Eat fat-free yogurt and low-fat cottage cheese. Try low-fat cheeses such as mozzarella and other reduced-fat cheeses.  Choose meat and other protein foods that are low in fat.  Choose beans or other legumes such as split peas or lentils. Choose fish, skinless poultry (chicken or turkey), or lean cuts of red meat (beef or pork). Before you cook meat or poultry, cut off any visible fat.   Use less fat and oil.  Try baking foods instead of frying them. Add less fat, such as margarine, sour cream, regular salad dressing and mayonnaise to foods. Eat fewer high-fat foods. Some examples of high-fat foods include french fries, doughnuts, ice cream, and cakes.  Eat fewer sweets.  Limit foods and drinks that are high in sugar. This  includes candy, cookies, regular soda, and sweetened drinks.  Exercise:  Exercise at least 30 minutes per day on most days of the week. Some examples of exercise include walking, biking, dancing, and swimming. You can also fit in more physical activity by taking the stairs instead of the elevator or parking farther away from stores. Ask your healthcare provider about the best exercise plan for you.      © Copyright [employer] 2018 Information is for End User's use only and may not be sold, redistributed or otherwise used for commercial purposes. All illustrations and images included in CareNotes® are the copyrighted property of [employer] or [employer]

## 2024-05-13 NOTE — PROGRESS NOTES
Assessment and Plan:     Problem List Items Addressed This Visit          Respiratory    Pulmonary emphysema, unspecified emphysema type (HCC)       Behavioral Health    Post-traumatic stress disorder, chronic (Chronic)    Relevant Medications    buPROPion (WELLBUTRIN XL) 150 mg 24 hr tablet    escitalopram (LEXAPRO) 10 mg tablet    Generalized anxiety disorder (Chronic)    Relevant Medications    buPROPion (WELLBUTRIN XL) 150 mg 24 hr tablet    escitalopram (LEXAPRO) 10 mg tablet    Bipolar 1 disorder, depressed (HCC)    Relevant Medications    buPROPion (WELLBUTRIN XL) 150 mg 24 hr tablet    escitalopram (LEXAPRO) 10 mg tablet       Surgery/Wound/Pain    Chronic pain of right knee       Other    Benzodiazepine dependence, continuous (HCC)    Hyperlipemia    Relevant Medications    atorvastatin (LIPITOR) 20 mg tablet     Other Visit Diagnoses       Anxiety    -  Primary    Relevant Medications    escitalopram (LEXAPRO) 10 mg tablet    Breast cancer screening by mammogram        Relevant Orders    Mammo screening bilateral w 3d & cad    Medicare annual wellness visit, subsequent        S/P knee surgery        Post-op pain                The case discussed with patient using patient centered shared decision making.The patient was counseled regarding instructions for management,-- risk factor reductions,-- prognosis,-- impressions,-- risks and benefits of treatment options,-- importance of compliance with treatment. I have reviewed the instructions with the patient, answering all questions to her satisfaction.    Patient reassured provided emotional support.    1.  Refractory anxiety  Resume E citalopram  Advised cannot increase benzo until she is off her oxycodone  Encouraged resuming therapy  She will consider  Follow closely  Return for recheck in 4 weeks    2.  Severe postop knee pain  Continue plan of care per orthopedic team    3.  Hypertension  Controlled today  Spikes situational related to pain and  ----- Message from Thao Verdugo DO sent at 10/12/2021  4:52 AM CDT -----  Please notify patient that thyroid labs are normal.  No meds recommended at this time.  Will monitor and repeat in 1 year   anxiety  Monitor closely as well    Preventive health issues were discussed with patient, and age appropriate screening tests were ordered as noted in patient's After Visit Summary.  Personalized health advice and appropriate referrals for health education or preventive services given if needed, as noted in patient's After Visit Summary.     History of Present Illness:     Patient presents for a Medicare Wellness Visit    58-year-old female known to me presents for follow-up, AWV  She is status post right knee repair at [facility] 6 weeks ago  She is very slow to recover  She endorses this is fourth knee surgery  She is having significant knee pain  She is still receiving oxycodone for pain control    She reports significant anxiety.  She is only taking clonazepam 0.5 daily.  She says this is not helping.  She was previously on a citalopram.  She is not taking anymore.    She was previously seeing therapist but not seeing any mental health professional at this point.  Father passed away last year she is still coping with this.  She has no significant support system at present  Dependent of alcohol in remission       Patient Care Team:  YONG Cook as PCP - General (Family Medicine)  Shaun Arnold MD as PCP - PCP-[facility] ([facility])  Mena Almaguer RN as Nurse Navigator (Orthopedics)     Review of Systems:     Review of Systems   Constitutional:  Positive for fatigue. Negative for fever.   Respiratory:  Negative for cough and shortness of breath.    Cardiovascular: Negative.    Gastrointestinal:         GI symptoms stable at present   Musculoskeletal:  Positive for arthralgias and gait problem (Antalgic gait, significant knee pain right status post knee surgery 6 weeks ago).   Neurological:  Negative for dizziness and weakness.   Psychiatric/Behavioral:  Positive for dysphoric mood and sleep disturbance (Poor sleep). Negative for suicidal ideas. The patient is nervous/anxious  (Complaining of significant anxiety current medications not helping).         Problem List:     Patient Active Problem List   Diagnosis    Essential hypertension    Alcohol abuse, in remission    Post-traumatic stress disorder, chronic    Generalized anxiety disorder    Elevated lactic acid level    Bipolar 1 disorder, depressed (HCC)    Benzodiazepine dependence, continuous (HCC)    Non compliance w medication regimen    Acquired hypothyroidism    Hypokalemia    Hyperlipemia    Transaminitis    Cognitive dysfunction in bipolar disorder (HCC)    Tobacco abuse    Gastroesophageal reflux disease without esophagitis    Type 2 diabetes mellitus with obesity  (HCC)    Hypothyroidism    Vitamin D deficiency    Chronic cough    Pulmonary emphysema, unspecified emphysema type (HCC)    Migraine without aura and without status migrainosus, not intractable    Diabetic gastroparesis  (HCC)    Constipation    Bloated abdomen    Hx of adenomatous colonic polyps    Primary osteoarthritis of right knee    Chronic pain of right knee    Effusion of right knee    Anemia      Past Medical and Surgical History:     Past Medical History:   Diagnosis Date    Addiction to drug (HCC)     Adjustment disorder     Alcohol abuse     Alcoholism (HCC)     Anxiety     Bipolar disorder (HCC)     Bowel obstruction (HCC)     Depression     Diabetes mellitus (HCC)     Diabetes type 2, controlled (HCC)     Disease of thyroid gland     Gastritis     Head injury     Heart palpitations     Hyperlipidemia     Hypertension     Hypothyroidism     Psychiatric illness     PTSD (post-traumatic stress disorder)     Seizure (HCC)     Seizures (HCC)     Sleep difficulties     Substance abuse (HCC)     Suicide attempt (HCC)     Withdrawal symptoms, drug or narcotic (HCC)      Past Surgical History:   Procedure Laterality Date    ABDOMINAL SURGERY      APPENDECTOMY      BOWEL RESECTION      CHOLECYSTECTOMY      ESOPHAGOGASTRODUODENOSCOPY N/A 05/18/2018    Procedure:  ESOPHAGOGASTRODUODENOSCOPY (EGD) with bx;  Surgeon: Lulu West DO;  Location: [facility];  Service: Gastroenterology    HYSTERECTOMY      KNEE SURGERY Bilateral 1991    NV ARTHRP KNE CONDYLE&PLATU MEDIAL&LAT COMPARTMENTS Right 4/1/2024    Procedure: ARTHROPLASTY KNEE TOTAL- possible same day;  Surgeon: Rosas Vincent DO;  Location: [facility];  Service: Orthopedics      Family History:     Family History   Problem Relation Age of Onset    Bipolar disorder Mother     Cancer Father     Diabetes Father       Social History:     Social History     Socioeconomic History    Marital status:      Spouse name: None    Number of children: None    Years of education: None    Highest education level: None   Occupational History    None   Tobacco Use    Smoking status: Every Day     Current packs/day: 1.00     Average packs/day: 1 pack/day for 25.0 years (25.0 ttl pk-yrs)     Types: Cigarettes     Passive exposure: Current    Smokeless tobacco: Never    Tobacco comments:     smokes like 5 a day(06/24/2022), smokes 3 cigarettes per day (3/19/24) last smoked 3.31.24   Vaping Use    Vaping status: Never Used   Substance and Sexual Activity    Alcohol use: Not Currently     Alcohol/week: 6.0 standard drinks of alcohol     Types: 6 Cans of beer per week     Comment: last drink on 08/15/20    Drug use: Not Currently    Sexual activity: Not Currently     Partners: Male     Birth control/protection: None   Other Topics Concern    None   Social History Narrative    None     Social Determinants of Health     Financial Resource Strain: Not on file   Food Insecurity: No Food Insecurity (5/13/2024)    Hunger Vital Sign     Worried About Running Out of Food in the Last Year: Never true     Ran Out of Food in the Last Year: Never true   Transportation Needs: No Transportation Needs (5/13/2024)    PRAPARE - Transportation     Lack of Transportation (Medical): No     Lack of Transportation (Non-Medical): No   Physical  Activity: Not on file   Stress: Not on file   Social Connections: Not on file   Intimate Partner Violence: Not on file   Housing Stability: Low Risk  (5/13/2024)    Housing Stability Vital Sign     Unable to Pay for Housing in the Last Year: No     Number of Places Lived in the Last Year: 1     Unstable Housing in the Last Year: No      Medications and Allergies:     Current Outpatient Medications   Medication Sig Dispense Refill    acetaminophen (TYLENOL) 500 mg tablet Take 2 tablets (1,000 mg total) by mouth every 8 (eight) hours 60 tablet 0    Alcohol Swabs 70 % PADS May substitute brand based on insurance coverage. Check glucose TID. 100 each 0    aluminum-magnesium hydroxide-simethicone (MAALOX) 3998-0774-022 mg/30 mL suspension Take 30 mL by mouth every 4 (four) hours as needed for indigestion or heartburn (gas) 355 mL 0    ascorbic acid (VITAMIN C) 500 mg tablet TAKE 1 TABLET(500 MG) BY MOUTH TWICE DAILY 60 tablet 1    aspirin (ECOTRIN LOW STRENGTH) 81 mg EC tablet Take 1 tablet (81 mg total) by mouth 2 (two) times a day 60 tablet 0    atorvastatin (LIPITOR) 20 mg tablet Take 1 tablet (20 mg total) by mouth daily 90 tablet 1    Blood Glucose Monitoring Suppl (OneTouch Verio Reflect) w/Device KIT May substitute brand based on insurance coverage. Check glucose TID. 1 kit 0    buPROPion (WELLBUTRIN XL) 150 mg 24 hr tablet Take 150 mg by mouth every morning      celecoxib (CeleBREX) 200 mg capsule TAKE 1 CAPSULE(200 MG) BY MOUTH TWICE DAILY 60 capsule 0    Diclofenac Sodium (VOLTAREN) 1 % Apply 2 g topically 4 (four) times a day 350 g 1    docusate sodium (COLACE) 100 mg capsule Take 1 capsule (100 mg total) by mouth 2 (two) times a day for 14 days 28 capsule 0    ergocalciferol (VITAMIN D2) 50,000 units TAKE 1 CAPSULE BY MOUTH ONCE  WEEKLY 15 capsule 2    escitalopram (LEXAPRO) 10 mg tablet Take 1 tablet (10 mg total) by mouth daily 90 tablet 1    ferrous sulfate 324 (65 Fe) mg TAKE 1 TABLET(324 MG) BY MOUTH  TWICE DAILY BEFORE MEALS 30 tablet 5    folic acid (FOLVITE) 1 mg tablet TAKE 1 TABLET BY MOUTH EVERY DAY 30 tablet 1    gabapentin (NEURONTIN) 800 mg tablet TAKE 1 TABLET BY MOUTH 3 TIMES  DAILY 300 tablet 1    hydroCHLOROthiazide 12.5 mg tablet TAKE 1 TABLET BY MOUTH DAILY 100 tablet 1    levothyroxine 25 mcg tablet Take 1 tablet (25 mcg total) by mouth daily in the early morning 90 tablet 1    metFORMIN (GLUCOPHAGE) 1000 MG tablet TAKE 1 TABLET BY MOUTH TWICE  DAILY WITH MEALS 200 tablet 1    metoprolol tartrate (LOPRESSOR) 50 mg tablet TAKE 1 TABLET BY MOUTH EVERY 12  HOURS 200 tablet 1    Microlet Lancets MISC       Multiple Vitamin (Multivitamin) TABS TAKE 1 TABLET BY MOUTH DAILY 30 tablet 1    Multiple Vitamins-Minerals (multivitamin with minerals) tablet Take 1 tablet by mouth daily 30 tablet 1    naloxone (NARCAN) 4 mg/0.1 mL nasal spray Administer 1 spray into a nostril. If no response after 2-3 minutes, give another dose in the other nostril using a new spray. 1 each 1    ondansetron (ZOFRAN) 4 mg tablet Take 4 mg by mouth every 8 (eight) hours as needed for nausea or vomiting      ondansetron (ZOFRAN-ODT) 4 mg disintegrating tablet Take 1 tablet (4 mg total) by mouth every 6 (six) hours as needed for nausea or vomiting 20 tablet 0    OneTouch Delica Lancets 33G MISC May substitute brand based on insurance coverage. Check glucose TID. 100 each 0    oxyCODONE (Roxicodone) 5 immediate release tablet Take 1 tablet (5 mg total) by mouth every 4 (four) hours as needed for moderate pain or severe pain for up to 10 days Max Daily Amount: 30 mg 42 tablet 0    SUMAtriptan (IMITREX) 25 mg tablet TAKE 1 TABLET(25 MG) BY MOUTH DAILY AS NEEDED FOR MIGRAINE. DO NOT TAKE MORE THAN 3 DOSES IN 1 WEEK 10 tablet 1    topiramate (TOPAMAX) 25 mg tablet Take 25 mg by mouth 2 (two) times a day      traZODone (DESYREL) 100 mg tablet TAKE 2 TABLETS(200 MG) BY MOUTH DAILY AT BEDTIME 60 tablet 5    ARIPiprazole (ABILIFY) 10 mg  tablet Take 1 tablet (10 mg total) by mouth daily (Patient not taking: Reported on 5/13/2024) 30 tablet 0    cholecalciferol (VITAMIN D3) 1,000 units tablet Take 2 tablets (2,000 Units total) by mouth daily (Patient not taking: Reported on 5/13/2024) 60 tablet 1    clonazePAM (KlonoPIN) 0.5 mg tablet Take 1 tablet (0.5 mg total) by mouth daily as needed for seizures (Patient not taking: Reported on 5/1/2024) 30 tablet 1    dicyclomine (BENTYL) 10 mg capsule Take 2 capsules (20 mg total) by mouth 3 (three) times a day as needed (Cramping) (Patient not taking: Reported on 3/20/2024) 20 capsule 0    insulin glargine (LANTUS) 100 units/mL subcutaneous injection Inject 25 Units under the skin daily at bedtime (Patient not taking: Reported on 5/13/2024) 10 mL 0    linaCLOtide 145 MCG CAPS Take 1 capsule (145 mcg total) by mouth in the morning (Patient not taking: Reported on 5/13/2024) 30 capsule 3    pantoprazole (PROTONIX) 40 mg tablet Take 1 tablet (40 mg total) by mouth 2 (two) times a day (Patient not taking: Reported on 5/13/2024) 180 tablet 2    polyethylene glycol (MIRALAX) 17 g packet Take 17 g by mouth daily (Patient not taking: Reported on 5/13/2024) 30 each 0    senna-docusate sodium (SENOKOT S) 8.6-50 mg per tablet Take 1 tablet by mouth daily (Patient not taking: Reported on 5/1/2024) 30 tablet 0    sucralfate (CARAFATE) 1 g tablet TAKE 1 TABLET BY MOUTH 4 TIMES  DAILY (Patient not taking: Reported on 3/20/2024) 400 tablet 1     No current facility-administered medications for this visit.     Allergies   Allergen Reactions    Latex Itching and Rash    Losartan Cough      Immunizations:     Immunization History   Administered Date(s) Administered    COVID-19 MODERNA VACC 0.5 ML IM 04/29/2021    INFLUENZA 10/20/2015, 12/14/2017, 10/30/2019, 01/04/2023, 10/16/2023    Influenza, injectable, quadrivalent, preservative free 0.5 mL 10/16/2023    Influenza, recombinant, quadrivalent,injectable, preservative free  03/24/2021, 01/04/2023    Pneumococcal Polysaccharide PPV23 12/14/2017    Tdap 03/22/2011      Health Maintenance:         Topic Date Due    Breast Cancer Screening: Mammogram  Never done    Lung Cancer Screening  01/21/2027 (Originally 3/28/2023)    Colorectal Cancer Screening  03/20/2028    HIV Screening  Completed    Hepatitis C Screening  Completed         Topic Date Due    Hepatitis A Vaccine (1 of 2 - Risk 2-dose series) Never done    COVID-19 Vaccine (2 - 2023-24 season) 09/01/2023      Medicare Screening Tests and Risk Assessments:     Bhavana is here for her Subsequent Wellness visit. Last Medicare Wellness visit information reviewed, patient interviewed, no change since last AWV.     Health Risk Assessment:   Patient rates overall health as fair. Patient feels that their physical health rating is slightly worse. Patient is satisfied with their life. Eyesight was rated as same. Hearing was rated as same. Patient feels that their emotional and mental health rating is slightly worse. Patients states they are sometimes angry. Patient states they are sometimes unusually tired/fatigued. Pain experienced in the last 7 days has been some. Patient's pain rating has been 7/10. Patient states that she has experienced no weight loss or gain in last 6 months. 6 weeks postop right knee surgery-still having significant pain    Fall Risk Screening:   In the past year, patient has experienced: no history of falling in past year      Urinary Incontinence Screening:   Patient has not leaked urine accidently in the last six months.     Home Safety:  Patient has trouble with stairs inside or outside of their home. Patient has working smoke alarms and has working carbon monoxide detector. Home safety hazards include: none.     Nutrition:   Current diet is Diabetic.     Medications:   Patient is not currently taking any over-the-counter supplements. Patient is able to manage medications.     Activities of Daily Living  (ADLs)/Instrumental Activities of Daily Living (IADLs):   Walk and transfer into and out of bed and chair?: Yes  Dress and groom yourself?: Yes    Bathe or shower yourself?: Yes    Feed yourself? Yes  Do your laundry/housekeeping?: Yes  Manage your money, pay your bills and track your expenses?: Yes  Make your own meals?: Yes    Do your own shopping?: Yes    Previous Hospitalizations:   Any hospitalizations or ED visits within the last 12 months?: No      Advance Care Planning:   Living will: No    Durable POA for healthcare: No    Advanced directive: No    Advanced directive counseling given: Yes    Patient declined ACP directive: Yes      Cognitive Screening:   Provider or family/friend/caregiver concerned regarding cognition?: No    PREVENTIVE SCREENINGS      Cardiovascular Screening:    General: History Lipid Disorder and Risks and Benefits Discussed    Due for: Lipid Panel      Diabetes Screening:     General: History Diabetes and Screening Current      Colorectal Cancer Screening:     General: Screening Current      Breast Cancer Screening:     General: Patient Declines      Cervical Cancer Screening:    General: Patient Declines      Osteoporosis Screening:    General: Patient Declines      Abdominal Aortic Aneurysm (AAA) Screening:        General: Patient Declines      Lung Cancer Screening:     General: Screening Current      Hepatitis C Screening:    General: Screening Current    Screening, Brief Intervention, and Referral to Treatment (SBIRT)    Screening  Typical number of drinks in a day: 0  Typical number of drinks in a week: 0  Interpretation: Low risk drinking behavior.    Brief Intervention  Alcohol & drug use screenings were reviewed. No concerns regarding substance use disorder identified.     Patient has Narcan.  Currently on oxycodone postop knee surgery, 0.5 mg of clonazepam    Review of Current Opioid Use  Opioid Risk Tool (ORT) Score: 5  Opioid Risk Tool (ORT) Interpretation: Score 4-7:  "Moderate risk for opioid misuse    Other Counseling Topics:   Car/seat belt/driving safety, skin self-exam, sunscreen and regular weightbearing exercise and calcium and vitamin D intake.     No results found.     Physical Exam:     /90 (BP Location: Left arm, Patient Position: Sitting, Cuff Size: Standard)   Pulse 73   Temp (!) 97.3 °F (36.3 °C) (Temporal)   Resp 17   Ht 5' 3\" (1.6 m)   Wt 77.6 kg (171 lb)   SpO2 95%   BMI 30.29 kg/m²     Physical Exam  Vitals and nursing note reviewed.   Constitutional:       General: She is not in acute distress.     Appearance: Normal appearance.   Cardiovascular:      Rate and Rhythm: Normal rate and regular rhythm.      Pulses: Normal pulses. no weak pulses.           Dorsalis pedis pulses are 2+ on the right side and 2+ on the left side.        Posterior tibial pulses are 2+ on the right side and 2+ on the left side.   Pulmonary:      Effort: Pulmonary effort is normal.      Breath sounds: Normal breath sounds.   Musculoskeletal:      Comments: Right knee incision healed   Feet:      Right foot:      Skin integrity: No ulcer, skin breakdown, erythema, warmth, callus or dry skin.      Left foot:      Skin integrity: No ulcer, skin breakdown, erythema, warmth, callus or dry skin.   Neurological:      General: No focal deficit present.      Mental Status: She is alert. Mental status is at baseline.      Gait: Gait abnormal (Antalgic gait right leg, using cane).   Psychiatric:         Mood and Affect: Affect is flat.      Comments: Slow speech      Patient's shoes and socks removed.    Right Foot/Ankle   Right Foot Inspection  Skin Exam: skin normal and skin intact. No dry skin, no warmth, no callus, no erythema, no maceration, no abnormal color, no pre-ulcer, no ulcer and no callus.     Toe Exam: ROM and strength within normal limits.     Sensory   Vibration: intact  Proprioception: intact  Monofilament testing: intact    Vascular  Capillary refills: < 3 seconds  The " right DP pulse is 2+. The right PT pulse is 2+.     Left Foot/Ankle  Left Foot Inspection  Skin Exam: skin normal and skin intact. No dry skin, no warmth, no erythema, no maceration, normal color, no pre-ulcer, no ulcer and no callus.     Toe Exam: ROM and strength within normal limits.     Sensory   Vibration: intact  Proprioception: intact  Monofilament testing: intact    Vascular  Capillary refills: < 3 seconds  The left DP pulse is 2+. The left PT pulse is 2+.     Assign Risk Category  No deformity present  No loss of protective sensation  No weak pulses  Risk: 0        YONG Wise

## 2024-05-14 ENCOUNTER — TELEPHONE (OUTPATIENT)
Dept: OBGYN CLINIC | Facility: MEDICAL CENTER | Age: 59
End: 2024-05-14

## 2024-05-14 NOTE — TELEPHONE ENCOUNTER
[facility] to reschedule her missed apt susan [place]-   She can be rsch with Rosemarie Bianchi on 5/16 or 5/21 at the [facility]

## 2024-05-15 NOTE — PROGRESS NOTES
"Daily Note     Today's date: 2024  Patient name: Bhavana Smith  : 1965  MRN: [id number]  Referring provider: Rosemarie Bianchi PA-C  Dx:   Encounter Diagnosis     ICD-10-CM    1. Acute pain of right knee  M25.561       2. Status post right knee replacement  Z96.651           Start Time: 1400  Stop Time: 1450  Total time in clinic (min): 50 minutes    Subjective: Pt reports she has some good days and some bad days. She states today her knee is not feeling bad. She was able to reschedule her ortho f/u for next week.       Objective: See treatment diary below      Assessment: Tolerated treatment fair. Pt attempted SLR however was unable to perform due to lack of strength, therefore instructed pt in SAQ with improved tolerance and terminal knee extension observed. Pt has poor tolerance to manual overpressure to assist extension and end range flexion, instructed pt in diaphragmatic breathing and performed static stretches and heel slides prior to MTT with improved tolerance noted. Pt had improved pain tolerance compared to previous sessions allowing for increased repetitions and exercises. Patient would benefit from continued PT      Plan: Continue per plan of care.      Precautions: Anxiety, Bipolar disorder, Depression, Diabetes type 2, Disease of thyroid gland, Hypertension, PTSD, Seizure, Substance abuse (HCC), Suicide attempt (HCC), LATEX ALLERGY; R TKA on   *HEP: [id number]     Manuals          STM                  PROM (flex & ext)   done  done done done                                                 Neuro Re-Ed                   Pt education Prognosis, diagnosis, HEP                 Bridges nv 2x4 2x10 2x10 2x10         Clamshells            Quad set 5\"x20 5\"x20 5\"x20 5\"x20 10\"x10         SLS                  HS stretch nv Seated 20\"x3 Seated 20\"x3 Seated 20\"x2 Seated 20\"x2         Calf stretch nv Seated 20\"x3 Seated 20\"x3 Seated 20\"x2 Seated 20\"x2      SLR  nv attempted     " "attempted         Heel slides 2x10  5\"x20 5\"x30 5\"x20 5\"x20         Ther Ex                   LAQ nv 4x5 4x5 4x5 2x10         SAQ nv 4x5 2x5  np 3x6         Standing HS curls                   Bike (ROM) nv   nv 5' rocking Post 5' rocking         Resisted TKE     Pink 5\"x20  Pink 5\"x20 Pink 5\"x20                             Ther Activity                   STS  nv 2 foam, 3x5 1 foam, 2x10  1 foam, 2x10  1 foam, 2x10         Side steps                   Step ups (FW)                   Step ups (LAT)                   Standing HR  nv 3x10 3x10 3x10 3x10         Modalities                   cryotherapy  10' post Declined, will do at home Tolerated 5'            Gait training       performed                            "

## 2024-05-16 ENCOUNTER — OFFICE VISIT (OUTPATIENT)
Dept: PHYSICAL THERAPY | Facility: REHABILITATION | Age: 59
End: 2024-05-16
Payer: COMMERCIAL

## 2024-05-16 DIAGNOSIS — Z96.651 STATUS POST RIGHT KNEE REPLACEMENT: ICD-10-CM

## 2024-05-16 DIAGNOSIS — M25.561 ACUTE PAIN OF RIGHT KNEE: Primary | ICD-10-CM

## 2024-05-16 PROCEDURE — 97110 THERAPEUTIC EXERCISES: CPT

## 2024-05-16 PROCEDURE — 97140 MANUAL THERAPY 1/> REGIONS: CPT

## 2024-05-16 PROCEDURE — 97112 NEUROMUSCULAR REEDUCATION: CPT

## 2024-05-20 DIAGNOSIS — M17.11 PRIMARY OSTEOARTHRITIS OF RIGHT KNEE: ICD-10-CM

## 2024-05-20 RX ORDER — OXYCODONE HYDROCHLORIDE 5 MG/1
5 TABLET ORAL EVERY 4 HOURS PRN
Qty: 42 TABLET | Refills: 0 | Status: SHIPPED | OUTPATIENT
Start: 2024-05-20 | End: 2024-05-28 | Stop reason: SDUPTHER

## 2024-05-20 NOTE — TELEPHONE ENCOUNTER
Reason for call:   [x] Refill   [] Prior Auth  [x] Other:     Office:   [] PCP/Provider -   [x] Specialty/Provider - Rosemarie Blackburn    Medication: oxycodone    Dose/Frequency: 5mg - every 4 hours PRN     Quantity: 42    Pharmacy: [facility] #[id number]    Does the patient have enough for 3 days?   [] Yes   [x] No - Send as HP to POD

## 2024-05-21 ENCOUNTER — TELEPHONE (OUTPATIENT)
Dept: OTHER | Facility: OTHER | Age: 59
End: 2024-05-21

## 2024-05-21 ENCOUNTER — APPOINTMENT (OUTPATIENT)
Dept: RADIOLOGY | Facility: MEDICAL CENTER | Age: 59
End: 2024-05-21
Payer: COMMERCIAL

## 2024-05-21 ENCOUNTER — OFFICE VISIT (OUTPATIENT)
Dept: OBGYN CLINIC | Facility: MEDICAL CENTER | Age: 59
End: 2024-05-21

## 2024-05-21 VITALS
BODY MASS INDEX: 30.3 KG/M2 | SYSTOLIC BLOOD PRESSURE: 159 MMHG | WEIGHT: 171 LBS | HEART RATE: 87 BPM | HEIGHT: 63 IN | DIASTOLIC BLOOD PRESSURE: 83 MMHG

## 2024-05-21 DIAGNOSIS — Z96.651 STATUS POST TOTAL KNEE REPLACEMENT, RIGHT: Primary | ICD-10-CM

## 2024-05-21 DIAGNOSIS — E03.9 HYPOTHYROIDISM: ICD-10-CM

## 2024-05-21 DIAGNOSIS — Z96.651 STATUS POST TOTAL KNEE REPLACEMENT, RIGHT: ICD-10-CM

## 2024-05-21 PROCEDURE — 73562 X-RAY EXAM OF KNEE 3: CPT

## 2024-05-21 PROCEDURE — 99024 POSTOP FOLLOW-UP VISIT: CPT | Performed by: PHYSICIAN ASSISTANT

## 2024-05-21 RX ORDER — LEVOTHYROXINE SODIUM 0.03 MG/1
25 TABLET ORAL
Qty: 90 TABLET | Refills: 3 | Status: SHIPPED | OUTPATIENT
Start: 2024-05-21

## 2024-05-21 RX ORDER — LIDOCAINE 50 MG/G
1 PATCH TOPICAL DAILY
Qty: 9 PATCH | Refills: 1 | Status: SHIPPED | OUTPATIENT
Start: 2024-05-21

## 2024-05-21 RX ORDER — LEVOTHYROXINE SODIUM 0.03 MG/1
25 TABLET ORAL
Qty: 90 TABLET | Refills: 1 | Status: SHIPPED | OUTPATIENT
Start: 2024-05-21 | End: 2024-05-21 | Stop reason: SDUPTHER

## 2024-05-21 NOTE — PROGRESS NOTES
Subjective:Patient seen and examined. Pain controlled. Progressing well, she is ambulating with a cane. Incision without drainage, mepilex dressing intact. Denies fevers or chills. She is attending formal PT.     Physical Exam:  Incision: CDI, no erythema or drainage noted  ROM: 3-90*  5/5 IP/Q/HS/TA/GS, 2+ DP/PT, SILT DP/SP/S/S/TN    XR Right knee: s/p press-fit CR attune TKA, components in good position. No fracture or dislocation.      Assessment/Plan:  57 y/o female doing well 7 weeks s/p Right TKA from 4/1/24    - continue multi-modal pain control    - Lidoderm patches prescribed today  - Weight bearing status: WBAT RLE  - DVT ppx: aspirin 81mg twice daily  - Incision care: No restrictions  - PT/OT, continue working on flexion   - F/U 3 weeks      Rosemarie Bianchi PA-C

## 2024-05-21 NOTE — TELEPHONE ENCOUNTER
Patient stated that she is out of Levothyroxine 25 mcg for 3 days. Please refill Rx Levothyroxine 25 mcg for 90 days with 3 refills with [facility] mail order. Please send Rx for 2 weeks supply to local pharmacy [facility].

## 2024-05-21 NOTE — TELEPHONE ENCOUNTER
Patient requesting refill on levothyroxine 25 mg to be sent to [facility] for refill. Patient would also like a 2 week supply sent to her local [facility] as she is out of medication.

## 2024-05-22 ENCOUNTER — APPOINTMENT (OUTPATIENT)
Dept: PHYSICAL THERAPY | Facility: REHABILITATION | Age: 59
End: 2024-05-22
Payer: COMMERCIAL

## 2024-05-22 NOTE — PROGRESS NOTES
"Daily Note     Today's date: 2024  Patient name: Bhavana Smith  : 1965  MRN: [id number]  Referring provider: Rosemarie Bianchi PA-C  Dx: No diagnosis found.               Subjective: ***      Objective: See treatment diary below      Assessment: Tolerated treatment fair. Patient would benefit from continued PT      Plan: Continue per plan of care.      Precautions: Anxiety, Bipolar disorder, Depression, Diabetes type 2, Disease of thyroid gland, Hypertension, PTSD, Seizure, Substance abuse (HCC), Suicide attempt (HCC), LATEX ALLERGY; R TKA on   *HEP: 48NXPTMQ     Manuals          STM                  PROM (flex & ext)   done  done done done                                                 Neuro Re-Ed                   Pt education Prognosis, diagnosis, HEP                 Bridges nv 2x4 2x10 2x10 2x10         Clamshells            Quad set 5\"x20 5\"x20 5\"x20 5\"x20 10\"x10         SLS                  HS stretch nv Seated 20\"x3 Seated 20\"x3 Seated 20\"x2 Seated 20\"x2         Calf stretch nv Seated 20\"x3 Seated 20\"x3 Seated 20\"x2 Seated 20\"x2      SLR  nv attempted     attempted         Heel slides 2x10  5\"x20 5\"x30 5\"x20 5\"x20         Ther Ex                   LAQ nv 4x5 4x5 4x5 2x10         SAQ nv 4x5 2x5  np 3x6         Standing HS curls                   Bike (ROM) nv   nv 5' rocking Post 5' rocking         Resisted TKE     Pink 5\"x20  Pink 5\"x20 Pink 5\"x20                             Ther Activity                   STS  nv 2 foam, 3x5 1 foam, 2x10  1 foam, 2x10  1 foam, 2x10         Side steps                   Step ups (FW)                   Step ups (LAT)                   Standing HR  nv 3x10 3x10 3x10 3x10         Modalities                   cryotherapy  10' post Declined, will do at home Tolerated 5'            Gait training       performed                              "

## 2024-05-23 ENCOUNTER — APPOINTMENT (OUTPATIENT)
Dept: PHYSICAL THERAPY | Facility: REHABILITATION | Age: 59
End: 2024-05-23
Payer: COMMERCIAL

## 2024-05-24 DIAGNOSIS — G43.009 MIGRAINE WITHOUT AURA AND WITHOUT STATUS MIGRAINOSUS, NOT INTRACTABLE: ICD-10-CM

## 2024-05-24 RX ORDER — SUMATRIPTAN 25 MG/1
TABLET, FILM COATED ORAL
Qty: 10 TABLET | Refills: 1 | Status: SHIPPED | OUTPATIENT
Start: 2024-05-24

## 2024-05-28 DIAGNOSIS — M17.11 PRIMARY OSTEOARTHRITIS OF RIGHT KNEE: ICD-10-CM

## 2024-05-28 RX ORDER — OXYCODONE HYDROCHLORIDE 5 MG/1
5 TABLET ORAL EVERY 4 HOURS PRN
Qty: 42 TABLET | Refills: 0 | Status: SHIPPED | OUTPATIENT
Start: 2024-05-28 | End: 2024-06-04 | Stop reason: SDUPTHER

## 2024-05-28 NOTE — TELEPHONE ENCOUNTER
Reason for call:   [x] Refill   [] Prior Auth  [] Other:     Office:   [] PCP/Provider -   [x] Specialty/Provider - Ortho     Medication: Oxycodone     Dose/Frequency: 5 mg immediate release tablet taken by mouth every 4 hours Prn for moderate to severe pain     Quantity: 42    Pharmacy: [facility] [id number] [id number] Counts include 234 beds at the [facility] [phone]     Does the patient have enough for 3 days?   [] Yes   [x] No - Send as HP to POD

## 2024-05-29 RX ORDER — CELECOXIB 200 MG/1
CAPSULE ORAL
Qty: 60 CAPSULE | Refills: 0 | Status: SHIPPED | OUTPATIENT
Start: 2024-05-29

## 2024-05-31 ENCOUNTER — APPOINTMENT (OUTPATIENT)
Dept: PHYSICAL THERAPY | Facility: REHABILITATION | Age: 59
End: 2024-05-31
Payer: COMMERCIAL

## 2024-05-31 NOTE — PROGRESS NOTES
"Daily Note     Today's date: 6/3/2024  Patient name: Bhavana Smith  : 1965  MRN: [id number]  Referring provider: Rosemarie Binachi PA-C  Dx:   Encounter Diagnosis     ICD-10-CM    1. Acute pain of right knee  M25.561       2. Status post right knee replacement  Z96.651           Start Time: 1400  Stop Time: 1445  Total time in clinic (min): 45 minutes    Subjective: Pt reports her knee continues to cause random sharp shooting pains with no specific ARMANDO. She has not been as compliant to her HEP as she would like. She had MD f/u who reported to continue PT.       Objective: See treatment diary below      Assessment: Tolerated treatment fair. Pt performed heel slides with therapist overpressure with poor tolerance 2/2 pain, provided verbal cues for diaphragmatic breathing and working through full available range with improved tolerance observed. Pt tolerated SLR with slight extension lag, instructed pt in quad set prior to initiating motion with improved VMO activation noted. Patient would benefit from continued PT      Plan: Continue per plan of care.      Precautions: Anxiety, Bipolar disorder, Depression, Diabetes type 2, Disease of thyroid gland, Hypertension, PTSD, Seizure, Substance abuse (HCC), Suicide attempt (HCC), LATEX ALLERGY; R TKA on   *HEP: 48NXPTMQ     Manuals 4/25 4/29 5/6 5/9 5/16  6/3       STM                  PROM (flex & ext)   done  done done done  done                                               Neuro Re-Ed                   Pt education Prognosis, diagnosis, HEP                 Bridges nv 2x4 2x10 2x10 2x10  2x10       Clamshells            Quad set 5\"x20 5\"x20 5\"x20 5\"x20 10\"x10 10\"x10       SLS                  HS stretch nv Seated 20\"x3 Seated 20\"x3 Seated 20\"x2 Seated 20\"x2 Seated 20\"x2       Calf stretch nv Seated 20\"x3 Seated 20\"x3 Seated 20\"x2 Seated 20\"x2 Seated 20\"x2     SLR  nv attempted     attempted 4x5       Heel slides 2x10  5\"x20 5\"x30 5\"x20 5\"x20 5\"x20     " "  Anterior tibial translation on step      10\"x10     Ther Ex                   LAQ nv 4x5 4x5 4x5 2x10  2x10       SAQ nv 4x5 2x5  np 3x6  2x10       Standing HS curls                   Bike (ROM) nv   nv 5' rocking Post 5' rocking Post 5' rocking       Resisted TKE     Pink 5\"x20  Pink 5\"x20 Pink 5\"x20  Pink 5\"x20                           Ther Activity                   STS  nv 2 foam, 3x5 1 foam, 2x10  1 foam, 2x10  1 foam, 2x10 2x10       Side steps                   Step ups (FW)            4\" nv       Step ups (LAT)                   Standing HR  nv 3x10 3x10 3x10 3x10  3x10       Modalities                   cryotherapy  10' post Declined, will do at home Tolerated 5'            Gait training       performed                                "

## 2024-06-03 ENCOUNTER — OFFICE VISIT (OUTPATIENT)
Dept: PHYSICAL THERAPY | Facility: REHABILITATION | Age: 59
End: 2024-06-03
Payer: COMMERCIAL

## 2024-06-03 DIAGNOSIS — M25.561 ACUTE PAIN OF RIGHT KNEE: Primary | ICD-10-CM

## 2024-06-03 DIAGNOSIS — Z96.651 STATUS POST RIGHT KNEE REPLACEMENT: ICD-10-CM

## 2024-06-03 PROCEDURE — 97110 THERAPEUTIC EXERCISES: CPT

## 2024-06-03 PROCEDURE — 97530 THERAPEUTIC ACTIVITIES: CPT

## 2024-06-03 PROCEDURE — 97112 NEUROMUSCULAR REEDUCATION: CPT

## 2024-06-04 DIAGNOSIS — M17.11 PRIMARY OSTEOARTHRITIS OF RIGHT KNEE: ICD-10-CM

## 2024-06-04 RX ORDER — OXYCODONE HYDROCHLORIDE 5 MG/1
5 TABLET ORAL EVERY 4 HOURS PRN
Qty: 42 TABLET | Refills: 0 | Status: SHIPPED | OUTPATIENT
Start: 2024-06-04 | End: 2024-06-14

## 2024-06-04 NOTE — TELEPHONE ENCOUNTER
Patient contacted refill line in regards to refill request stating that she checked with her pharmacy and they have not received anything as of yet. Did inform patient that refill is still pending approval. Also, that refills can take up to 72 hours. Patient verbalized understanding.
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
## 2024-06-06 ENCOUNTER — OFFICE VISIT (OUTPATIENT)
Dept: PHYSICAL THERAPY | Facility: REHABILITATION | Age: 59
End: 2024-06-06
Payer: COMMERCIAL

## 2024-06-06 DIAGNOSIS — M25.561 ACUTE PAIN OF RIGHT KNEE: Primary | ICD-10-CM

## 2024-06-06 DIAGNOSIS — Z96.651 STATUS POST RIGHT KNEE REPLACEMENT: ICD-10-CM

## 2024-06-06 PROCEDURE — 97112 NEUROMUSCULAR REEDUCATION: CPT

## 2024-06-06 PROCEDURE — 97530 THERAPEUTIC ACTIVITIES: CPT

## 2024-06-06 PROCEDURE — 97110 THERAPEUTIC EXERCISES: CPT

## 2024-06-07 NOTE — PROGRESS NOTES
PT Re-Evaluation     Today's date: 2024  Patient name: Bhavana Smith  : 1965  MRN: [id number]  Referring provider: Rosemarie Bianchi PA-C  Dx:   No diagnosis found.                 Assessment  Impairments: abnormal gait, abnormal muscle tone, abnormal or restricted ROM, abnormal movement, activity intolerance, impaired balance, impaired physical strength, lacks appropriate home exercise program, pain with function, weight-bearing intolerance, poor posture  and poor body mechanics  Symptom irritability: high    Assessment details: Pt has been present in PT for 8 visits 2/ R knee pain s/p R TKA on  limiting pt activity tolerance. Pt has made improvements in thigh musculature flexibility resulting in gains with knee AROM. Pt has improved with quad strength however continues to have extension lag during SLR. Pt has poor tolerance to sessions due to random sharp pains. Pt continues to benefit from MTT demonstrating improvements in mobility and reduction of stiffness. Pt continues to benefit from therapeutic exercise progressions to maximize mobility and function.   Functional limitations continue to include sitting, standing, walking, stair negotiation, and ADLs. Provided updated copy of HEP to aide with compliance with good understanding. Pt was independent prior to surgery and she would benefit from skilled PT interventions to address above mentioned impairments, activity limitations, and participation restrictions to reduce risk for injury and return to prior activity tolerance.     Understanding of Dx/Px/POC: good     Prognosis: good    Goals  STG  1. Improve knee AROM to WNL from gains in flexibility PROGRESSING   2. <4/10 pain with >30 min walking without AD from gains in positional tolerance PROGRESSING   3. <20 sec on TUG without AD to reduce risk for falls PROGRESSING   LTG  1. SLS >25 sec to maximize balance on uneven surfaces PROGRESSING   2. Increase knee MMT >4+/5 in all planes to improve  unilateral stability PROGRESSING   3. Increase FOTO score to goal to facilitate return to PLOF PROGRESSING     Plan  Patient would benefit from: skilled physical therapy  Planned modality interventions: cryotherapy    Planned therapy interventions: abdominal trunk stabilization, ADL training, activity modification, balance, IASTM, joint mobilization, manual therapy, balance/weight bearing training, body mechanics training, coordination, dressing changes, flexibility, functional ROM exercises, gait training, home exercise program, neuromuscular re-education, patient education, postural training, strengthening, stretching, therapeutic activities, therapeutic exercise and transfer training    Frequency: 2x week  Duration in weeks: 8  Plan of Care beginning date: 4/25/2024  Plan of Care expiration date: 7/18/2024  Treatment plan discussed with: patient  Plan details: Thank you for referring Bhavana Smith to PT at [facility] and for the opportunity to coordinate care.          Subjective  Pt reports some improvements since beginning PT, however pain continues to be CC. Pt underwent R TKA on 4/1. Pain is described as aching, occasionally sharp shooting, around posterior, medial, and lateral knee and currently rated 7/10. Patient reports numbness around incision site. Pain improves to 4/10 with rest, ice, and elevation. Pain increases to 10/10 with resting in knee extension, sitting (<10 min), STS transfers, standing (<10 min), walking (counter surfing during household ambulation, SPC with community ambulation), stairs (step to pattern ascending and descending) and sleeping (3+ nightly disturbances/week). Patient has increased difficulty with ADLs such as driving, cooking, cleaning, bending down, and lifting. Patient is on disability. Prior to dysfunction, patient was walking without assistance and performing ADLs without assistance which she now has difficulty with. Patient goals for PT are to reduce pain and return to  "PLOF. Pt lives with boyfriend who can provide assistance when necessary. Pt has 2 flights of stairs at home.   Objective      Posture: slight knee flexion, increased reliance on SPC resulting in lateral trunk lean  5x STS: not performed  SLS: R 7 sec L 25 sec  TU sec with SPC  Palpation: +TTP medial and lateral incision sites, incision healing well  ROM:   Knee flexion: R 78 deg L 140 deg   Knee extension: R -5 deg L 0 deg   MMT:   Knee flexion: R 4-/5 L 4+/5  Knee extension: R 3+/5 L 4/5  Special tests:   VMO tone: R poor L fair       Precautions: Anxiety, Bipolar disorder, Depression, Diabetes type 2, Disease of thyroid gland, Hypertension, PTSD, Seizure, Substance abuse (HCC), Suicide attempt (HCC), LATEX ALLERGY; R TKA on   *HEP: 48NXPTMQ   Manuals 4/25 4/29 5/6 5/9 5/16  6/3 6/6     STM                  PROM (flex & ext)   done  done done done  done  done                                             Neuro Re-Ed                   Pt education Prognosis, diagnosis, HEP                 Bridges nv 2x4 2x10 2x10 2x10  2x10  2x10     Clamshells            Quad set 5\"x20 5\"x20 5\"x20 5\"x20 10\"x10 10\"x10 10\"x10     SLS                  HS stretch nv Seated 20\"x3 Seated 20\"x3 Seated 20\"x2 Seated 20\"x2 Seated 20\"x2 Seated 20\"x2     Calf stretch w/ strap nv Seated 20\"x3 Seated 20\"x3 Seated 20\"x2 Seated 20\"x2 Seated 20\"x2 Seated 20\"x2    SLR  nv attempted     attempted 4x5 3x5     Heel slides 2x10  5\"x20 5\"x30 5\"x20 5\"x20 5\"x20  5\"x20     Anterior tibial translation on step      10\"x10 10\"x10    Ther Ex                   LAQ nv 4x5 4x5 4x5 2x10  2x10  1# 2x10     SAQ nv 4x5 2x5  np 3x6  2x10  2x10     Standing HS curls                   Bike (ROM) nv   nv 5' rocking Post 5' rocking Post 5' rocking Post 5' rocking     Resisted TKE     Pink 5\"x20  Pink 5\"x20 Pink 5\"x20  Pink 5\"x20  Pink 5\"x20                         Ther Activity                   STS  nv 2 foam, 3x5 1 foam, 2x10  1 foam, 2x10  1 foam, 2x10 2x10  2x10  " "   Side steps                   Step ups (FW)            4\" nv  4\" x5     Step ups (LAT)                   Standing HR  nv 3x10 3x10 3x10 3x10  3x10  3x10     Modalities                   cryotherapy  10' post Declined, will do at home Tolerated 5'            Gait training       performed                    "

## 2024-06-09 ENCOUNTER — APPOINTMENT (EMERGENCY)
Dept: RADIOLOGY | Facility: HOSPITAL | Age: 59
End: 2024-06-09
Payer: COMMERCIAL

## 2024-06-09 ENCOUNTER — HOSPITAL ENCOUNTER (EMERGENCY)
Facility: HOSPITAL | Age: 59
Discharge: HOME | End: 2024-06-09
Attending: EMERGENCY MEDICINE
Payer: COMMERCIAL

## 2024-06-09 VITALS
OXYGEN SATURATION: 97 % | HEART RATE: 70 BPM | TEMPERATURE: 99.3 F | DIASTOLIC BLOOD PRESSURE: 66 MMHG | RESPIRATION RATE: 20 BRPM | SYSTOLIC BLOOD PRESSURE: 142 MMHG

## 2024-06-09 DIAGNOSIS — R00.2 PALPITATIONS: Primary | ICD-10-CM

## 2024-06-09 DIAGNOSIS — E83.42 HYPOMAGNESEMIA: ICD-10-CM

## 2024-06-09 LAB
ALBUMIN SERPL BCP-MCNC: 4.2 G/DL (ref 3.5–5)
ALP SERPL-CCNC: 97 U/L (ref 34–104)
ALT SERPL W P-5'-P-CCNC: 20 U/L (ref 7–52)
ANION GAP SERPL CALCULATED.3IONS-SCNC: 9 MMOL/L (ref 4–13)
AST SERPL W P-5'-P-CCNC: 13 U/L (ref 13–39)
ATRIAL RATE: 84 BPM
BASOPHILS # BLD AUTO: 0.02 THOUSANDS/ÂΜL (ref 0–0.1)
BASOPHILS NFR BLD AUTO: 0 % (ref 0–1)
BILIRUB SERPL-MCNC: 0.28 MG/DL (ref 0.2–1)
BILIRUB UR QL STRIP: NEGATIVE
BUN SERPL-MCNC: 11 MG/DL (ref 5–25)
CALCIUM SERPL-MCNC: 9.4 MG/DL (ref 8.4–10.2)
CARDIAC TROPONIN I PNL SERPL HS: 4 NG/L
CHLORIDE SERPL-SCNC: 100 MMOL/L (ref 96–108)
CLARITY UR: CLEAR
CO2 SERPL-SCNC: 28 MMOL/L (ref 21–32)
COLOR UR: YELLOW
CREAT SERPL-MCNC: 0.66 MG/DL (ref 0.6–1.3)
EOSINOPHIL # BLD AUTO: 0.12 THOUSAND/ÂΜL (ref 0–0.61)
EOSINOPHIL NFR BLD AUTO: 1 % (ref 0–6)
ERYTHROCYTE [DISTWIDTH] IN BLOOD BY AUTOMATED COUNT: 14 % (ref 11.6–15.1)
FLUAV RNA RESP QL NAA+PROBE: NEGATIVE
FLUBV RNA RESP QL NAA+PROBE: NEGATIVE
GFR SERPL CREATININE-BSD FRML MDRD: 99 ML/MIN/1.73SQ M
GLUCOSE SERPL-MCNC: 138 MG/DL (ref 65–140)
GLUCOSE SERPL-MCNC: 140 MG/DL (ref 65–140)
GLUCOSE UR STRIP-MCNC: NEGATIVE MG/DL
HCT VFR BLD AUTO: 36.4 % (ref 34.8–46.1)
HGB BLD-MCNC: 11.3 G/DL (ref 11.5–15.4)
HGB UR QL STRIP.AUTO: NEGATIVE
IMM GRANULOCYTES # BLD AUTO: 0.03 THOUSAND/UL (ref 0–0.2)
IMM GRANULOCYTES NFR BLD AUTO: 0 % (ref 0–2)
KETONES UR STRIP-MCNC: NEGATIVE MG/DL
LEUKOCYTE ESTERASE UR QL STRIP: NEGATIVE
LYMPHOCYTES # BLD AUTO: 2.54 THOUSANDS/ÂΜL (ref 0.6–4.47)
LYMPHOCYTES NFR BLD AUTO: 27 % (ref 14–44)
MAGNESIUM SERPL-MCNC: 1.8 MG/DL (ref 1.9–2.7)
MCH RBC QN AUTO: 27 PG (ref 26.8–34.3)
MCHC RBC AUTO-ENTMCNC: 31 G/DL (ref 31.4–37.4)
MCV RBC AUTO: 87 FL (ref 82–98)
MONOCYTES # BLD AUTO: 0.76 THOUSAND/ÂΜL (ref 0.17–1.22)
MONOCYTES NFR BLD AUTO: 8 % (ref 4–12)
NEUTROPHILS # BLD AUTO: 5.98 THOUSANDS/ÂΜL (ref 1.85–7.62)
NEUTS SEG NFR BLD AUTO: 64 % (ref 43–75)
NITRITE UR QL STRIP: NEGATIVE
NRBC BLD AUTO-RTO: 0 /100 WBCS
P AXIS: 51 DEGREES
PH UR STRIP.AUTO: 6 [PH]
PLATELET # BLD AUTO: 409 THOUSANDS/UL (ref 149–390)
PMV BLD AUTO: 9.3 FL (ref 8.9–12.7)
POTASSIUM SERPL-SCNC: 3.5 MMOL/L (ref 3.5–5.3)
PR INTERVAL: 192 MS
PROT SERPL-MCNC: 7.2 G/DL (ref 6.4–8.4)
PROT UR STRIP-MCNC: NEGATIVE MG/DL
QRS AXIS: 22 DEGREES
QRSD INTERVAL: 72 MS
QT INTERVAL: 384 MS
QTC INTERVAL: 453 MS
RBC # BLD AUTO: 4.19 MILLION/UL (ref 3.81–5.12)
RSV RNA RESP QL NAA+PROBE: NEGATIVE
SARS-COV-2 RNA RESP QL NAA+PROBE: NEGATIVE
SODIUM SERPL-SCNC: 137 MMOL/L (ref 135–147)
SP GR UR STRIP.AUTO: <=1.005 (ref 1–1.03)
T WAVE AXIS: 54 DEGREES
TSH SERPL DL<=0.05 MIU/L-ACNC: 2.35 UIU/ML (ref 0.45–4.5)
UROBILINOGEN UR QL STRIP.AUTO: 0.2 E.U./DL
VENTRICULAR RATE: 84 BPM
WBC # BLD AUTO: 9.45 THOUSAND/UL (ref 4.31–10.16)

## 2024-06-09 PROCEDURE — 81003 URINALYSIS AUTO W/O SCOPE: CPT | Performed by: PHYSICIAN ASSISTANT

## 2024-06-09 PROCEDURE — 96361 HYDRATE IV INFUSION ADD-ON: CPT

## 2024-06-09 PROCEDURE — 80053 COMPREHEN METABOLIC PANEL: CPT | Performed by: PHYSICIAN ASSISTANT

## 2024-06-09 PROCEDURE — 0241U HB NFCT DS VIR RESP RNA 4 TRGT: CPT | Performed by: PHYSICIAN ASSISTANT

## 2024-06-09 PROCEDURE — 99285 EMERGENCY DEPT VISIT HI MDM: CPT | Performed by: PHYSICIAN ASSISTANT

## 2024-06-09 PROCEDURE — 99285 EMERGENCY DEPT VISIT HI MDM: CPT

## 2024-06-09 PROCEDURE — 93010 ELECTROCARDIOGRAM REPORT: CPT | Performed by: INTERNAL MEDICINE

## 2024-06-09 PROCEDURE — 93005 ELECTROCARDIOGRAM TRACING: CPT

## 2024-06-09 PROCEDURE — 85025 COMPLETE CBC W/AUTO DIFF WBC: CPT | Performed by: PHYSICIAN ASSISTANT

## 2024-06-09 PROCEDURE — 71045 X-RAY EXAM CHEST 1 VIEW: CPT

## 2024-06-09 PROCEDURE — 36415 COLL VENOUS BLD VENIPUNCTURE: CPT | Performed by: PHYSICIAN ASSISTANT

## 2024-06-09 PROCEDURE — 82948 REAGENT STRIP/BLOOD GLUCOSE: CPT

## 2024-06-09 PROCEDURE — 83735 ASSAY OF MAGNESIUM: CPT | Performed by: PHYSICIAN ASSISTANT

## 2024-06-09 PROCEDURE — 84484 ASSAY OF TROPONIN QUANT: CPT | Performed by: PHYSICIAN ASSISTANT

## 2024-06-09 PROCEDURE — 96360 HYDRATION IV INFUSION INIT: CPT

## 2024-06-09 PROCEDURE — 84443 ASSAY THYROID STIM HORMONE: CPT | Performed by: PHYSICIAN ASSISTANT

## 2024-06-09 RX ORDER — LANOLIN ALCOHOL/MO/W.PET/CERES
400 CREAM (GRAM) TOPICAL 2 TIMES DAILY
Status: DISCONTINUED | OUTPATIENT
Start: 2024-06-09 | End: 2024-06-09 | Stop reason: HOSPADM

## 2024-06-09 RX ORDER — OXYCODONE HYDROCHLORIDE 5 MG/1
2.5 TABLET ORAL ONCE
Status: COMPLETED | OUTPATIENT
Start: 2024-06-09 | End: 2024-06-09

## 2024-06-09 RX ADMIN — OXYCODONE HYDROCHLORIDE 2.5 MG: 5 TABLET ORAL at 15:27

## 2024-06-09 RX ADMIN — SODIUM CHLORIDE 1000 ML: 0.9 INJECTION, SOLUTION INTRAVENOUS at 15:08

## 2024-06-09 RX ADMIN — Medication 400 MG: at 15:55

## 2024-06-09 NOTE — DISCHARGE INSTRUCTIONS
Please return to the emergency department for worsening symptoms including chest pain, shortness of breath, dizziness, lightheadedness, fever greater than 103, severe pain, inability to walk, fainting episodes, etc..  Please follow-up with your family practice provider as soon as possible.  I have placed an order for Holter monitor.  Follow-up outpatient for this.  Follow-up outpatient with cardiology.  I have given your referral.  Follow-up with your PCP for refill on your benzodiazepines.

## 2024-06-10 ENCOUNTER — TELEPHONE (OUTPATIENT)
Dept: OBGYN CLINIC | Facility: MEDICAL CENTER | Age: 59
End: 2024-06-10

## 2024-06-10 ENCOUNTER — APPOINTMENT (OUTPATIENT)
Dept: PHYSICAL THERAPY | Facility: REHABILITATION | Age: 59
End: 2024-06-10
Payer: COMMERCIAL

## 2024-06-10 DIAGNOSIS — M17.11 PRIMARY OSTEOARTHRITIS OF RIGHT KNEE: ICD-10-CM

## 2024-06-10 DIAGNOSIS — K31.84 DIABETIC GASTROPARESIS  (HCC): ICD-10-CM

## 2024-06-10 DIAGNOSIS — E11.43 DIABETIC GASTROPARESIS  (HCC): ICD-10-CM

## 2024-06-10 DIAGNOSIS — F31.4 BIPOLAR DISORDER, CURRENT EPISODE DEPRESSED, SEVERE, WITHOUT PSYCHOTIC FEATURES (HCC): ICD-10-CM

## 2024-06-10 RX ORDER — CLONAZEPAM 0.5 MG/1
0.5 TABLET ORAL DAILY PRN
Qty: 30 TABLET | Refills: 1 | Status: SHIPPED | OUTPATIENT
Start: 2024-06-10

## 2024-06-10 RX ORDER — PANTOPRAZOLE SODIUM 40 MG/1
40 TABLET, DELAYED RELEASE ORAL 2 TIMES DAILY
Qty: 180 TABLET | Refills: 2 | Status: SHIPPED | OUTPATIENT
Start: 2024-06-10 | End: 2024-09-08

## 2024-06-10 RX ORDER — OXYCODONE HYDROCHLORIDE 5 MG/1
5 TABLET ORAL EVERY 4 HOURS PRN
Qty: 42 TABLET | Refills: 0 | Status: CANCELLED | OUTPATIENT
Start: 2024-06-10 | End: 2024-06-20

## 2024-06-10 RX ORDER — OXYCODONE HYDROCHLORIDE 5 MG/1
5 TABLET ORAL EVERY 4 HOURS PRN
Qty: 42 TABLET | Refills: 0 | Status: SHIPPED | OUTPATIENT
Start: 2024-06-10 | End: 2024-06-18 | Stop reason: SDUPTHER

## 2024-06-10 NOTE — TELEPHONE ENCOUNTER
Pt is calling to get a Rx refill for oxycodone . Please f/u and advise Thank you .  Medication Refill Request     Name   oxyCODONE (ROXICODONE) IR tablet 2.5 mg     Dose/Frequency 2.5 mg   Quantity --  Verified pharmacy   [x]  Verified ordering Provider   [x]  Does patient have enough for the next 3 days? Yes [] No [x]

## 2024-06-10 NOTE — TELEPHONE ENCOUNTER
Left message to please call the office. Reminding Pt of appointment with Dr. Chacon on 6/13/2024 but Dr. Vincent would like to see you tomorrow 6/11/2024 instead of Dr. Chacon appointment on 6/13/2024. Left office phone number for Pt to return call.

## 2024-06-10 NOTE — TELEPHONE ENCOUNTER
Medication Refill Request     Name   clonazePAM (KlonoPIN) 0.5 mg tablet    Dose/Frequency Take 1 tablet (0.5 mg total) by mouth daily as needed for seizures   Quantity 30  Verified pharmacy   [x]  Verified ordering Provider   [x]  Does patient have enough for the next 3 days? Yes [] No [x]      Medication Refill Request     Name pantoprazole (PROTONIX) 40 mg tablet [[id number]]    Dose/Frequency 1 tablet (40 mg total) by mouth 2 (two) times a day   Quantity 180  Verified pharmacy   [x]  Verified ordering Provider   [x]  Does patient have enough for the next 3 days? Yes [] No [x]

## 2024-06-10 NOTE — TELEPHONE ENCOUNTER
Pt would also to to schedule f/u office visit with office . Pt was seen in the ER yesterday please f/u and schedule. Thank you.

## 2024-06-10 NOTE — ED PROVIDER NOTES
"History  Chief Complaint   Patient presents with    Palpitations     Pt reports palpitations, SOB, HTN at home starting yesterday. Pt currently on BP meds but believes she needs the dosage adjusted. Pt denies chest pain     This is a 58-year-old female with past medical history significant for alcohol abuse, substance abuse, type 2 diabetes mellitus, gastritis, hypertension, and hyperlipidemia presenting to the emergency department today for palpitations.  The patient notes constant palpitations since yesterday.  \"I just do not feel right\".  The patient notes that she is felt tremulous at home presents to the emergency department without any tremors.  She denies any chest pain but does note chronic baseline shortness of breath.  She denies any fevers or chills.  She has no lower extremity swelling.  She has no nausea, vomiting, diarrhea, or constipation.  She denies any lower extremity swelling.  The patient had a knee operation approximately 2 months ago and continues to use oxycodone at home.  She also notes that her lead pressure has been high at home and she believes that she needs an adjustment to her antihypertensive regimen.  She has no changes to vision or decrease in urination.  No flank pain.  No other complaints at this time.      History provided by:  Patient   used: No    Palpitations  Palpitations quality:  Regular  Onset quality:  Gradual  Duration:  1 day  Timing:  Constant  Progression:  Unchanged  Chronicity:  New  Relieved by:  Nothing  Worsened by:  Nothing  Ineffective treatments:  None tried  Associated symptoms: shortness of breath (chronic and baseline)    Associated symptoms: no back pain, no chest pain, no chest pressure, no cough, no diaphoresis, no dizziness, no hemoptysis, no leg pain, no lower extremity edema, no malaise/fatigue, no nausea, no numbness, no orthopnea, no syncope, no vomiting and no weakness        Prior to Admission Medications   Prescriptions Last " Dose Informant Patient Reported? Taking?   ARIPiprazole (ABILIFY) 10 mg tablet  Self No No   Sig: Take 1 tablet (10 mg total) by mouth daily   Patient not taking: Reported on 5/13/2024   Alcohol Swabs 70 % PADS  Self No No   Sig: May substitute brand based on insurance coverage. Check glucose TID.   Blood Glucose Monitoring Suppl (OneTouch Verio Reflect) w/Device KIT  Self No No   Sig: May substitute brand based on insurance coverage. Check glucose TID.   Diclofenac Sodium (VOLTAREN) 1 %   No No   Sig: Apply 2 g topically 4 (four) times a day   Microlet Lancets MISC  Self Yes No   Multiple Vitamin (Multivitamin) TABS   No No   Sig: TAKE 1 TABLET BY MOUTH DAILY   Multiple Vitamins-Minerals (multivitamin with minerals) tablet   No No   Sig: Take 1 tablet by mouth daily   OneTouch Delica Lancets 33G MISC  Self No No   Sig: May substitute brand based on insurance coverage. Check glucose TID.   SUMAtriptan (IMITREX) 25 mg tablet   No No   Sig: TAKE 1 TABLET(25 MG) BY MOUTH DAILY AS NEEDED FOR MIGRAINE. DO NOT TAKE MORE THAN 3 DOSES IN 1 WEEK   acetaminophen (TYLENOL) 500 mg tablet   No No   Sig: Take 2 tablets (1,000 mg total) by mouth every 8 (eight) hours   aluminum-magnesium hydroxide-simethicone (MAALOX) 9534-7296-055 mg/30 mL suspension  Self No No   Sig: Take 30 mL by mouth every 4 (four) hours as needed for indigestion or heartburn (gas)   ascorbic acid (VITAMIN C) 500 mg tablet   No No   Sig: TAKE 1 TABLET(500 MG) BY MOUTH TWICE DAILY   aspirin (ECOTRIN LOW STRENGTH) 81 mg EC tablet   No No   Sig: Take 1 tablet (81 mg total) by mouth 2 (two) times a day   atorvastatin (LIPITOR) 20 mg tablet   No No   Sig: Take 1 tablet (20 mg total) by mouth daily   buPROPion (WELLBUTRIN XL) 150 mg 24 hr tablet   Yes No   Sig: Take 150 mg by mouth every morning   celecoxib (CeleBREX) 200 mg capsule   No No   Sig: TAKE 1 CAPSULE(200 MG) BY MOUTH TWICE DAILY   cholecalciferol (VITAMIN D3) 1,000 units tablet   No No   Sig: Take 2  tablets (2,000 Units total) by mouth daily   Patient not taking: Reported on 5/13/2024   clonazePAM (KlonoPIN) 0.5 mg tablet   No No   Sig: Take 1 tablet (0.5 mg total) by mouth daily as needed for seizures   Patient not taking: Reported on 5/1/2024   dicyclomine (BENTYL) 10 mg capsule  Self No No   Sig: Take 2 capsules (20 mg total) by mouth 3 (three) times a day as needed (Cramping)   Patient not taking: Reported on 3/20/2024   docusate sodium (COLACE) 100 mg capsule  Self No No   Sig: Take 1 capsule (100 mg total) by mouth 2 (two) times a day for 14 days   ergocalciferol (VITAMIN D2) 50,000 units   No No   Sig: TAKE 1 CAPSULE BY MOUTH ONCE  WEEKLY   escitalopram (LEXAPRO) 10 mg tablet   No No   Sig: Take 1 tablet (10 mg total) by mouth daily   ferrous sulfate 324 (65 Fe) mg   No No   Sig: TAKE 1 TABLET(324 MG) BY MOUTH TWICE DAILY BEFORE MEALS   folic acid (FOLVITE) 1 mg tablet   No No   Sig: TAKE 1 TABLET BY MOUTH EVERY DAY   gabapentin (NEURONTIN) 800 mg tablet   No No   Sig: TAKE 1 TABLET BY MOUTH 3 TIMES  DAILY   hydroCHLOROthiazide 12.5 mg tablet   No No   Sig: TAKE 1 TABLET BY MOUTH DAILY   insulin glargine (LANTUS) 100 units/mL subcutaneous injection  Self No No   Sig: Inject 25 Units under the skin daily at bedtime   Patient not taking: Reported on 5/13/2024   levothyroxine 25 mcg tablet   No No   Sig: Take 1 tablet (25 mcg total) by mouth daily in the early morning   lidocaine (Lidoderm) 5 %   No No   Sig: Apply 1 patch topically over 12 hours daily Remove & Discard patch within 12 hours or as directed by MD   linaCLOtide 145 MCG CAPS   No No   Sig: Take 1 capsule (145 mcg total) by mouth in the morning   Patient not taking: Reported on 5/13/2024   metFORMIN (GLUCOPHAGE) 1000 MG tablet   No No   Sig: TAKE 1 TABLET BY MOUTH TWICE  DAILY WITH MEALS   metoprolol tartrate (LOPRESSOR) 50 mg tablet   No No   Sig: TAKE 1 TABLET BY MOUTH EVERY 12  HOURS   naloxone (NARCAN) 4 mg/0.1 mL nasal spray   No No   Sig:  Administer 1 spray into a nostril. If no response after 2-3 minutes, give another dose in the other nostril using a new spray.   Patient not taking: Reported on 5/21/2024   ondansetron (ZOFRAN) 4 mg tablet   Yes No   Sig: Take 4 mg by mouth every 8 (eight) hours as needed for nausea or vomiting   Patient not taking: Reported on 5/21/2024   ondansetron (ZOFRAN-ODT) 4 mg disintegrating tablet   No No   Sig: Take 1 tablet (4 mg total) by mouth every 6 (six) hours as needed for nausea or vomiting   Patient not taking: Reported on 5/21/2024   oxyCODONE (Roxicodone) 5 immediate release tablet   No No   Sig: Take 1 tablet (5 mg total) by mouth every 4 (four) hours as needed for moderate pain or severe pain for up to 10 days Max Daily Amount: 30 mg   pantoprazole (PROTONIX) 40 mg tablet  Self No No   Sig: Take 1 tablet (40 mg total) by mouth 2 (two) times a day   Patient not taking: Reported on 5/13/2024   polyethylene glycol (MIRALAX) 17 g packet  Self No No   Sig: Take 17 g by mouth daily   Patient not taking: Reported on 5/13/2024   senna-docusate sodium (SENOKOT S) 8.6-50 mg per tablet   No No   Sig: Take 1 tablet by mouth daily   Patient not taking: Reported on 5/21/2024   sucralfate (CARAFATE) 1 g tablet   No No   Sig: TAKE 1 TABLET BY MOUTH 4 TIMES  DAILY   Patient not taking: Reported on 5/21/2024   topiramate (TOPAMAX) 25 mg tablet   Yes No   Sig: Take 25 mg by mouth 2 (two) times a day   traZODone (DESYREL) 100 mg tablet   No No   Sig: TAKE 2 TABLETS(200 MG) BY MOUTH DAILY AT BEDTIME      Facility-Administered Medications: None       Past Medical History:   Diagnosis Date    Addiction to drug ([facility])     Adjustment disorder     Alcohol abuse     Alcoholism ([facility])     Anxiety     Bipolar disorder ([facility])     Bowel obstruction ([facility])     Depression     Diabetes mellitus ([facility])     Diabetes type 2, controlled ([facility])     Disease of thyroid gland     Gastritis     Head injury     Heart palpitations     Hyperlipidemia      Hypertension     Hypothyroidism     Psychiatric illness     PTSD (post-traumatic stress disorder)     Seizure ([facility])     Seizures ([facility])     Sleep difficulties     Substance abuse ([facility])     Suicide attempt ([facility])     Withdrawal symptoms, drug or narcotic ([facility])        Past Surgical History:   Procedure Laterality Date    ABDOMINAL SURGERY      APPENDECTOMY      BOWEL RESECTION      CHOLECYSTECTOMY      ESOPHAGOGASTRODUODENOSCOPY N/A 05/18/2018    Procedure: ESOPHAGOGASTRODUODENOSCOPY (EGD) with bx;  Surgeon: Lulu West DO;  Location: [facility];  Service: Gastroenterology    HYSTERECTOMY      KNEE SURGERY Bilateral 1991    DC ARTHRP KNE CONDYLE&PLATU MEDIAL&LAT COMPARTMENTS Right 4/1/2024    Procedure: ARTHROPLASTY KNEE TOTAL- possible same day;  Surgeon: Rosas Vincent DO;  Location: [facility];  Service: Orthopedics       Family History   Problem Relation Age of Onset    Bipolar disorder Mother     Cancer Father     Diabetes Father      I have reviewed and agree with the history as documented.    E-Cigarette/Vaping    E-Cigarette Use Never User      E-Cigarette/Vaping Substances    Nicotine No     THC No     CBD No     Flavoring No     Other No     Unknown No      Social History     Tobacco Use    Smoking status: Every Day     Current packs/day: 1.00     Average packs/day: 1 pack/day for 25.0 years (25.0 ttl pk-yrs)     Types: Cigarettes     Passive exposure: Current    Smokeless tobacco: Never    Tobacco comments:     smokes like 5 a day(06/24/2022), smokes 3 cigarettes per day (3/19/24) last smoked 3.31.24   Vaping Use    Vaping status: Never Used   Substance Use Topics    Alcohol use: Not Currently     Alcohol/week: 6.0 standard drinks of alcohol     Types: 6 Cans of beer per week     Comment: last drink on 08/15/20    Drug use: Not Currently       Review of Systems   Constitutional:  Negative for appetite change, chills, diaphoresis, fatigue, fever and malaise/fatigue.   Eyes:  Negative for visual  disturbance.   Respiratory:  Positive for shortness of breath (chronic and baseline). Negative for cough, hemoptysis, chest tightness and wheezing.    Cardiovascular:  Positive for palpitations. Negative for chest pain, orthopnea, leg swelling and syncope.   Gastrointestinal:  Negative for abdominal pain, constipation, diarrhea, nausea and vomiting.   Musculoskeletal:  Negative for back pain, neck pain and neck stiffness.   Skin:  Negative for rash and wound.   Neurological:  Negative for dizziness, seizures, syncope, weakness, light-headedness, numbness and headaches.   Psychiatric/Behavioral:  Negative for confusion.    All other systems reviewed and are negative.      Physical Exam  Physical Exam  Vitals and nursing note reviewed.   Constitutional:       General: She is not in acute distress.     Appearance: Normal appearance. She is normal weight. She is not ill-appearing, toxic-appearing or diaphoretic.   HENT:      Head: Normocephalic and atraumatic.      Nose: Nose normal. No congestion or rhinorrhea.      Mouth/Throat:      Mouth: Mucous membranes are moist.      Pharynx: No oropharyngeal exudate or posterior oropharyngeal erythema.   Eyes:      General: No scleral icterus.        Right eye: No discharge.         Left eye: No discharge.      Extraocular Movements: Extraocular movements intact.      Pupils: Pupils are equal, round, and reactive to light.   Cardiovascular:      Rate and Rhythm: Normal rate and regular rhythm.      Pulses: Normal pulses.      Heart sounds: Normal heart sounds. No murmur heard.     No friction rub. No gallop.   Pulmonary:      Effort: Pulmonary effort is normal. No respiratory distress.      Breath sounds: Normal breath sounds. No stridor. No wheezing, rhonchi or rales.   Chest:      Chest wall: No tenderness.   Abdominal:      General: Abdomen is flat. There is no distension.      Palpations: Abdomen is soft.      Tenderness: There is no abdominal tenderness. There is no right  CVA tenderness, left CVA tenderness, guarding or rebound.   Musculoskeletal:         General: Normal range of motion.      Cervical back: Normal range of motion. No tenderness.      Right lower leg: No edema.      Left lower leg: No edema.   Skin:     General: Skin is warm and dry.      Capillary Refill: Capillary refill takes less than 2 seconds.      Coloration: Skin is not jaundiced or pale.      Comments: Postsurgical scar to the right knee   Neurological:      General: No focal deficit present.      Mental Status: She is alert and oriented to person, place, and time. Mental status is at baseline.   Psychiatric:         Mood and Affect: Mood normal.         Behavior: Behavior normal.         Vital Signs  ED Triage Vitals   Temperature Pulse Respirations Blood Pressure SpO2   06/09/24 1425 06/09/24 1423 06/09/24 1423 06/09/24 1423 06/09/24 1423   99.3 °F (37.4 °C) 85 20 (!) 186/83 98 %      Temp Source Heart Rate Source Patient Position - Orthostatic VS BP Location FiO2 (%)   06/09/24 1425 06/09/24 1530 06/09/24 1530 06/09/24 1530 --   Oral Monitor Lying Left arm       Pain Score       06/09/24 1527       8           Vitals:    06/09/24 1423 06/09/24 1530   BP: (!) 186/83 142/66   Pulse: 85 70   Patient Position - Orthostatic VS:  Lying         Visual Acuity      ED Medications  Medications   sodium chloride 0.9 % bolus 1,000 mL (0 mL Intravenous Stopped 6/9/24 1639)   oxyCODONE (ROXICODONE) IR tablet 2.5 mg (2.5 mg Oral Given 6/9/24 1527)       Diagnostic Studies  Results Reviewed       Procedure Component Value Units Date/Time    FLU/RSV/COVID - if FLU/RSV clinically relevant [[id number]]  (Normal) Collected: 06/09/24 1502    Lab Status: Final result Specimen: Nares from Nose Updated: 06/09/24 1549     SARS-CoV-2 Negative     INFLUENZA A PCR Negative     INFLUENZA B PCR Negative     RSV PCR Negative    Narrative:      FOR PEDIATRIC PATIENTS - copy/paste COVID Guidelines URL to browser:  [email or web address]    SARS-CoV-2 assay is a Nucleic Acid Amplification assay intended for the  qualitative detection of nucleic acid from SARS-CoV-2 in nasopharyngeal  swabs. Results are for the presumptive identification of SARS-CoV-2 RNA.    Positive results are indicative of infection with SARS-CoV-2, the virus  causing COVID-19, but do not rule out bacterial infection or co-infection  with other viruses. Laboratories within the [place] and its  territories are required to report all positive results to the appropriate  public health authorities. Negative results do not preclude SARS-CoV-2  infection and should not be used as the sole basis for treatment or other  patient management decisions. Negative results must be combined with  clinical observations, patient history, and epidemiological information.  This test has not been FDA cleared or approved.    This test has been authorized by FDA under an Emergency Use Authorization  (EUA). This test is only authorized for the duration of time the  declaration that circumstances exist justifying the authorization of the  emergency use of an in vitro diagnostic tests for detection of SARS-CoV-2  virus and/or diagnosis of COVID-19 infection under section 564(b)(1) of  the Act, 21 U.S.C. 360bbb-3(b)(1), unless the authorization is terminated  or revoked sooner. The test has been validated but independent review by FDA  and CLIA is pending.    Test performed using [employer] GeneXpert: This RT-PCR assay targets N2,  a region unique to SARS-CoV-2. A conserved region in the E-gene was chosen  for pan-Sarbecovirus detection which includes SARS-CoV-2.    According to CMS-2020-01-R, this platform meets the definition of high-throughput technology.    TSH, 3rd generation with Free T4 reflex [[id number]]  (Normal) Collected: 06/09/24 1502    Lab Status: Final result Specimen: Blood from Arm, Left Updated: 06/09/24 1542      TSH 3RD GENERATON 2.352 uIU/mL     HS Troponin 0hr (reflex protocol) [[id number]]  (Normal) Collected: 06/09/24 1502    Lab Status: Final result Specimen: Blood from Arm, Left Updated: 06/09/24 1533     hs TnI 0hr 4 ng/L     Comprehensive metabolic panel [[id number]] Collected: 06/09/24 1502    Lab Status: Final result Specimen: Blood from Arm, Left Updated: 06/09/24 1528     Sodium 137 mmol/L      Potassium 3.5 mmol/L      Chloride 100 mmol/L      CO2 28 mmol/L      ANION GAP 9 mmol/L      BUN 11 mg/dL      Creatinine 0.66 mg/dL      Glucose 140 mg/dL      Calcium 9.4 mg/dL      AST 13 U/L      ALT 20 U/L      Alkaline Phosphatase 97 U/L      Total Protein 7.2 g/dL      Albumin 4.2 g/dL      Total Bilirubin 0.28 mg/dL      eGFR 99 ml/min/1.73sq m     Narrative:      National Kidney Disease Foundation guidelines for Chronic Kidney Disease (CKD):     Stage 1 with normal or high GFR (GFR > 90 mL/min/1.73 square meters)    Stage 2 Mild CKD (GFR = 60-89 mL/min/1.73 square meters)    Stage 3A Moderate CKD (GFR = 45-59 mL/min/1.73 square meters)    Stage 3B Moderate CKD (GFR = 30-44 mL/min/1.73 square meters)    Stage 4 Severe CKD (GFR = 15-29 mL/min/1.73 square meters)    Stage 5 End Stage CKD (GFR <15 mL/min/1.73 square meters)  Note: GFR calculation is accurate only with a steady state creatinine    Magnesium [[id number]]  (Abnormal) Collected: 06/09/24 1502    Lab Status: Final result Specimen: Blood from Arm, Left Updated: 06/09/24 1528     Magnesium 1.8 mg/dL     UA w Reflex to Microscopic w Reflex to Culture [[id number]]  (Normal) Collected: 06/09/24 1502    Lab Status: Final result Specimen: Urine, Clean Catch Updated: 06/09/24 1517     Color, UA Yellow     Clarity, UA Clear     Specific Gravity, UA <=1.005     pH, UA 6.0     Leukocytes, UA Negative     Nitrite, UA Negative     Protein, UA Negative mg/dl      Glucose, UA Negative mg/dl      Ketones, UA Negative mg/dl      Urobilinogen, UA 0.2 E.U./dl      Bilirubin,  UA Negative     Occult Blood, UA Negative    CBC and differential [[id number]]  (Abnormal) Collected: 06/09/24 1502    Lab Status: Final result Specimen: Blood from Arm, Left Updated: 06/09/24 1510     WBC 9.45 Thousand/uL      RBC 4.19 Million/uL      Hemoglobin 11.3 g/dL      Hematocrit 36.4 %      MCV 87 fL      MCH 27.0 pg      MCHC 31.0 g/dL      RDW 14.0 %      MPV 9.3 fL      Platelets 409 Thousands/uL      nRBC 0 /100 WBCs      Segmented % 64 %      Immature Grans % 0 %      Lymphocytes % 27 %      Monocytes % 8 %      Eosinophils Relative 1 %      Basophils Relative 0 %      Absolute Neutrophils 5.98 Thousands/µL      Absolute Immature Grans 0.03 Thousand/uL      Absolute Lymphocytes 2.54 Thousands/µL      Absolute Monocytes 0.76 Thousand/µL      Eosinophils Absolute 0.12 Thousand/µL      Basophils Absolute 0.02 Thousands/µL     Fingerstick Glucose (POCT) [[id number]]  (Normal) Collected: 06/09/24 1507    Lab Status: Final result Specimen: Blood Updated: 06/09/24 1508     POC Glucose 138 mg/dl                    XR chest 1 view portable   Final Result by Nilda Piedra MD (06/09 2106)      No acute cardiopulmonary disease.            Workstation performed: [id number]                    Procedures  ECG 12 Lead Documentation Only    Date/Time: 6/9/2024 2:22 PM    Performed by: Kevon Ocampo PA-C  Authorized by: Kevon Ocampo PA-C    Indications / Diagnosis:  Palpitations  Patient location:  ED  Previous ECG:     Previous ECG:  Compared to current    Comparison ECG info:  5/1/2024    Similarity:  No change  Interpretation:     Interpretation: normal    Rate:     ECG rate:  84    ECG rate assessment: normal    Rhythm:     Rhythm: sinus rhythm    Ectopy:     Ectopy: none    QRS:     QRS axis:  Normal  Conduction:     Conduction: normal    ST segments:     ST segments:  Normal  T waves:     T waves: normal    Comments:      Normal sinus rhythm with a rate of 84.  Normal axis.   No ectopy.  No STEMI.           ED Course                                   Wells' Criteria for PE      Flowsheet Row Most Recent Value   Wells' Criteria for PE    Clinical signs and symptoms of DVT 0 Filed at: 06/09/2024 1551   PE is primary diagnosis or equally likely 0 Filed at: 06/09/2024 1551   HR >100 0 Filed at: 06/09/2024 1551   Immobilization at least 3 days or Surgery in the previous 4 weeks 0 Filed at: 06/09/2024 1551   Previous, objectively diagnosed PE or DVT 0 Filed at: 06/09/2024 1551   Hemoptysis 0 Filed at: 06/09/2024 1551   Malignancy with treatment within 6 months or palliative 0 Filed at: 06/09/2024 1551   Wells' Criteria Total 0 Filed at: 06/09/2024 1551                  Medical Decision Making  58-year-old female presenting to the emergency department today for palpitations.  Symptoms began yesterday.  Patient notes her blood pressure has been high though she has no changes to her vision, dizziness, lightheadedness, or chest pain.  No decreased urination.  Vital signs are stable.  Afebrile.  On physical examination, the patient has a regular rate and rhythm without any murmurs rubs or gallops on cardiac examination.  She is a postsurgical scar to her right knee.  EKG shows normal sinus rhythm with a rate of 84.  No ectopy.  No STEMI.  Chest x-ray is without acute cardiopulmonary disease on my independent interpretation.  Labs show mild hypomagnesemia at 1.8.  Other labs are overall reassuring.  Negative troponin x 1.  The patient is dosed with oxycodone while here in the emergency department as she is currently on this for postsurgical pain.  She was also given intravenous fluids.  The patient is stable for discharge at this time.  Referral placed for Holter monitor.  The patient was given outpatient cardiology follow-up.  Referral placed.  Hypomagnesemia was repleted with oral magnesium oxide while here in the emergency department.  Strict return precautions were given.  Recommend PCP  "follow-up as soon as possible. The patient and/or patient's proxy verify their understanding and agree to the plan at this time.  All questions answered to the patient and/or their proxy's satisfaction.  All labs reviewed and utilized in the medical decision making process (if labs were ordered).  Portions of the record may have been created with voice recognition software.  Occasional wrong word or \"sound a like\" substitutions may have occurred due to the inherent limitations of voice recognition software.  Read the chart carefully and recognize, using context, where substitutions have occurred.    Problems Addressed:  Hypomagnesemia: undiagnosed new problem with uncertain prognosis  Palpitations: undiagnosed new problem with uncertain prognosis    Amount and/or Complexity of Data Reviewed  Labs: ordered. Decision-making details documented in ED Course.  Radiology: ordered and independent interpretation performed. Decision-making details documented in ED Course.     Details: CXR  ECG/medicine tests: ordered and independent interpretation performed. Decision-making details documented in ED Course.    Risk  Prescription drug management.             Disposition  Final diagnoses:   Palpitations   Hypomagnesemia     Time reflects when diagnosis was documented in both MDM as applicable and the Disposition within this note       Time User Action Codes Description Comment    6/9/2024  4:24 PM Kevon [place] Add [R00.2] Palpitations     6/9/2024  4:24 PM Kevon Ocampo Add [E83.42] Hypomagnesemia           ED Disposition       ED Disposition   Discharge    Condition   Stable    Date/Time   Sun Jun 9, 2024 1624    Comment   Bhavana Smith discharge to home/self care.                   Follow-up Information       Follow up With Specialties Details Why Contact Info Additional Information    [facility], Nurse Practitioner Schedule an appointment as soon as possible for a visit   [address] [facility]  [address]  [facility] [address] [phone]       [facility] Emergency Department Emergency Medicine Go to  If symptoms worsen [address] [address] [phone] [facility] Emergency Department, [address] [address]    [facility] Call   [address] [facility] [address] [phone] [facility] [facility] [address] [place] [address] [phone]            Discharge Medication List as of 6/9/2024  4:25 PM        CONTINUE these medications which have NOT CHANGED    Details   acetaminophen (TYLENOL) 500 mg tablet Take 2 tablets (1,000 mg total) by mouth every 8 (eight) hours, Starting Mon 4/1/2024, Normal      Alcohol Swabs 70 % PADS May substitute brand based on insurance coverage. Check glucose TID., Normal      aluminum-magnesium hydroxide-simethicone (MAALOX) 5370-6608-007 mg/30 mL suspension Take 30 mL by mouth every 4 (four) hours as needed for indigestion or heartburn (gas), Starting Wed 9/6/2023, Normal      ARIPiprazole (ABILIFY) 10 mg tablet Take 1 tablet (10 mg total) by mouth daily, Starting Tue 11/8/2022, Normal      ascorbic acid (VITAMIN C) 500 mg tablet TAKE 1 TABLET(500 MG) BY MOUTH TWICE DAILY, Normal      aspirin (ECOTRIN LOW STRENGTH) 81 mg EC tablet Take 1 tablet (81 mg total) by mouth 2 (two) times a day, Starting Mon 4/1/2024, Normal      atorvastatin (LIPITOR) 20 mg tablet Take 1 tablet (20 mg total) by mouth daily, Starting Mon 5/13/2024, Normal      Blood Glucose Monitoring Suppl (OneTouch Verio Reflect) w/Device KIT May substitute brand based on insurance coverage. Check glucose TID., Normal      buPROPion (WELLBUTRIN XL) 150 mg 24 hr tablet Take 150 mg by mouth every morning, Starting Thu 3/14/2024, Historical Med      celecoxib (CeleBREX) 200 mg capsule TAKE 1 CAPSULE(200 MG) BY MOUTH TWICE DAILY, Normal       cholecalciferol (VITAMIN D3) 1,000 units tablet Take 2 tablets (2,000 Units total) by mouth daily, Starting Tue 3/5/2024, Normal      clonazePAM (KlonoPIN) 0.5 mg tablet Take 1 tablet (0.5 mg total) by mouth daily as needed for seizures, Starting Mon 4/29/2024, Normal      Diclofenac Sodium (VOLTAREN) 1 % Apply 2 g topically 4 (four) times a day, Starting Mon 2/5/2024, Normal      dicyclomine (BENTYL) 10 mg capsule Take 2 capsules (20 mg total) by mouth 3 (three) times a day as needed (Cramping), Starting Thu 10/19/2023, Normal      docusate sodium (COLACE) 100 mg capsule Take 1 capsule (100 mg total) by mouth 2 (two) times a day for 14 days, Starting Thu 3/23/2023, Until Mon 5/13/2024, Normal      ergocalciferol (VITAMIN D2) 50,000 units TAKE 1 CAPSULE BY MOUTH ONCE  WEEKLY, Starting Thu 3/28/2024, Normal      escitalopram (LEXAPRO) 10 mg tablet Take 1 tablet (10 mg total) by mouth daily, Starting Mon 5/13/2024, Normal      ferrous sulfate 324 (65 Fe) mg TAKE 1 TABLET(324 MG) BY MOUTH TWICE DAILY BEFORE MEALS, Normal      folic acid (FOLVITE) 1 mg tablet TAKE 1 TABLET BY MOUTH EVERY DAY, Starting Wed 5/1/2024, Normal      gabapentin (NEURONTIN) 800 mg tablet TAKE 1 TABLET BY MOUTH 3 TIMES  DAILY, Normal      hydroCHLOROthiazide 12.5 mg tablet TAKE 1 TABLET BY MOUTH DAILY, Starting Mon 4/22/2024, Normal      insulin glargine (LANTUS) 100 units/mL subcutaneous injection Inject 25 Units under the skin daily at bedtime, Starting Tue 11/8/2022, No Print      levothyroxine 25 mcg tablet Take 1 tablet (25 mcg total) by mouth daily in the early morning, Starting Tue 5/21/2024, Normal      lidocaine (Lidoderm) 5 % Apply 1 patch topically over 12 hours daily Remove & Discard patch within 12 hours or as directed by MD, Starting Tue 5/21/2024, Normal      linaCLOtide 145 MCG CAPS Take 1 capsule (145 mcg total) by mouth in the morning, Starting Thu 11/2/2023, Until Tue 12/12/2023, Normal      metFORMIN (GLUCOPHAGE) 1000 MG  tablet TAKE 1 TABLET BY MOUTH TWICE  DAILY WITH MEALS, Starting Mon 4/22/2024, Normal      metoprolol tartrate (LOPRESSOR) 50 mg tablet TAKE 1 TABLET BY MOUTH EVERY 12  HOURS, Starting Fri 3/15/2024, Normal      !! Microlet Lancets MISC Starting Mon 1/3/2022, Historical Med      Multiple Vitamin (Multivitamin) TABS TAKE 1 TABLET BY MOUTH DAILY, Starting Wed 5/1/2024, Normal      Multiple Vitamins-Minerals (multivitamin with minerals) tablet Take 1 tablet by mouth daily, Starting Tue 3/5/2024, Normal      naloxone (NARCAN) 4 mg/0.1 mL nasal spray Administer 1 spray into a nostril. If no response after 2-3 minutes, give another dose in the other nostril using a new spray., Phone In      ondansetron (ZOFRAN) 4 mg tablet Take 4 mg by mouth every 8 (eight) hours as needed for nausea or vomiting, Starting Wed 4/3/2024, Historical Med      ondansetron (ZOFRAN-ODT) 4 mg disintegrating tablet Take 1 tablet (4 mg total) by mouth every 6 (six) hours as needed for nausea or vomiting, Starting Mon 4/1/2024, Normal      !! OneTouch Delica Lancets 33G MISC May substitute brand based on insurance coverage. Check glucose TID., Normal      oxyCODONE (Roxicodone) 5 immediate release tablet Take 1 tablet (5 mg total) by mouth every 4 (four) hours as needed for moderate pain or severe pain for up to 10 days Max Daily Amount: 30 mg, Starting Tue 6/4/2024, Until Fri 6/14/2024 at 2359, Normal      pantoprazole (PROTONIX) 40 mg tablet Take 1 tablet (40 mg total) by mouth 2 (two) times a day, Starting Thu 11/2/2023, Until Wed 1/31/2024, Normal      polyethylene glycol (MIRALAX) 17 g packet Take 17 g by mouth daily, Starting Wed 9/6/2023, Normal      senna-docusate sodium (SENOKOT S) 8.6-50 mg per tablet Take 1 tablet by mouth daily, Starting Mon 4/1/2024, Normal      sucralfate (CARAFATE) 1 g tablet TAKE 1 TABLET BY MOUTH 4 TIMES  DAILY, Starting Wed 1/31/2024, Normal      SUMAtriptan (IMITREX) 25 mg tablet TAKE 1 TABLET(25 MG) BY MOUTH  DAILY AS NEEDED FOR MIGRAINE. DO NOT TAKE MORE THAN 3 DOSES IN 1 WEEK, Normal      topiramate (TOPAMAX) 25 mg tablet Take 25 mg by mouth 2 (two) times a day, Starting Thu 3/14/2024, Historical Med      traZODone (DESYREL) 100 mg tablet TAKE 2 TABLETS(200 MG) BY MOUTH DAILY AT BEDTIME, Starting Mon 1/22/2024, Normal       !! - Potential duplicate medications found. Please discuss with provider.          Outpatient Discharge Orders   Ambulatory Referral to Cardiology   Standing Status: Future Standing Exp. Date: 06/09/25      Holter monitor   Standing Status: Future Standing Exp. Date: 06/09/28       PDMP Review         Value Time User    PDMP Reviewed  Yes 6/4/2024  3:54 PM Rosemarie Bianchi PA-C            ED Provider  Electronically Signed by             Kevon Ocampo PA-C  06/09/24 [id number]

## 2024-06-10 NOTE — TELEPHONE ENCOUNTER
Reason for call:   [x] Refill   [] Prior Auth  [] Other:     Office:   [] PCP/Provider -   [x] Specialty/Provider - Ortho/Rosemarie Bianchi PA-C     Medication: oxyCODONE (Roxicodone) 5 immediate release tablet     Dose/Frequency: Take 1 tablet (5 mg total) by mouth every 4 (four) hours as needed for moderate pain or severe pain     Quantity: 42    Pharmacy: [id number], [place] - [address]      Does the patient have enough for 3 days?   [] Yes   [x] No - Send as HP to POD

## 2024-06-10 NOTE — TELEPHONE ENCOUNTER
Please schedule an ED follow up with pt. I filled her clonazepam. She will have to call ortho for the oxycodone.

## 2024-06-12 NOTE — PROGRESS NOTES
PT Re-Evaluation     Today's date: 2024  Patient name: Bhavana Smith  : 1965  MRN: [id number]  Referring provider: Rosemarie Bianchi PA-C  Dx:   No diagnosis found.                 Assessment  Impairments: abnormal gait, abnormal muscle tone, abnormal or restricted ROM, abnormal movement, activity intolerance, impaired balance, impaired physical strength, lacks appropriate home exercise program, pain with function, weight-bearing intolerance, poor posture  and poor body mechanics  Symptom irritability: high    Assessment details: Pt has been present in PT for 8 visits 2/2 R knee pain s/p R TKA on  limiting pt activity tolerance. Pt has made improvements in thigh musculature flexibility resulting in gains with knee AROM. Pt has improved with quad strength however continues to have extension lag during SLR. Pt has poor tolerance to sessions due to random sharp pains. Pt continues to benefit from MTT demonstrating improvements in mobility and reduction of stiffness. Pt continues to benefit from therapeutic exercise progressions to maximize mobility and function.   Functional limitations continue to include sitting, standing, walking, stair negotiation, and ADLs. Provided updated copy of HEP to aide with compliance with good understanding. Pt was independent prior to surgery and she would benefit from skilled PT interventions to address above mentioned impairments, activity limitations, and participation restrictions to reduce risk for injury and return to prior activity tolerance.     Understanding of Dx/Px/POC: good     Prognosis: good    Goals  STG  1. Improve knee AROM to WNL from gains in flexibility PROGRESSING   2. <4/10 pain with >30 min walking without AD from gains in positional tolerance PROGRESSING   3. <20 sec on TUG without AD to reduce risk for falls PROGRESSING   LTG  1. SLS >25 sec to maximize balance on uneven surfaces PROGRESSING   2. Increase knee MMT >4+/5 in all planes to improve  unilateral stability PROGRESSING   3. Increase FOTO score to goal to facilitate return to PLOF PROGRESSING     Plan  Patient would benefit from: skilled physical therapy  Planned modality interventions: cryotherapy    Planned therapy interventions: abdominal trunk stabilization, ADL training, activity modification, balance, IASTM, joint mobilization, manual therapy, balance/weight bearing training, body mechanics training, coordination, dressing changes, flexibility, functional ROM exercises, gait training, home exercise program, neuromuscular re-education, patient education, postural training, strengthening, stretching, therapeutic activities, therapeutic exercise and transfer training    Frequency: 2x week  Duration in weeks: 8  Plan of Care beginning date: 4/25/2024  Plan of Care expiration date: 7/18/2024  Treatment plan discussed with: patient  Plan details: Thank you for referring Bhavana Smith to PT at [facility] and for the opportunity to coordinate care.        Subjective  Pt reports some improvements since beginning PT, however pain continues to be CC. Pt underwent R TKA on 4/1. Pain is described as aching, occasionally sharp shooting, around posterior, medial, and lateral knee and currently rated 7/10. Patient reports numbness around incision site. Pain improves to 4/10 with rest, ice, and elevation. Pain increases to 10/10 with resting in knee extension, sitting (<10 min), STS transfers, standing (<10 min), walking (counter surfing during household ambulation, SPC with community ambulation), stairs (step to pattern ascending and descending) and sleeping (3+ nightly disturbances/week). Patient has increased difficulty with ADLs such as driving, cooking, cleaning, bending down, and lifting. Patient is on disability. Prior to dysfunction, patient was walking without assistance and performing ADLs without assistance which she now has difficulty with. Patient goals for PT are to reduce pain and return to  "PLOF. Pt lives with boyfriend who can provide assistance when necessary. Pt has 2 flights of stairs at home.   Objective      Posture: slight knee flexion, increased reliance on SPC resulting in lateral trunk lean  5x STS: not performed  SLS: R 7 sec L 25 sec  TU sec with SPC  Palpation: +TTP medial and lateral incision sites, incision healing well  ROM:   Knee flexion: R 78 deg L 140 deg   Knee extension: R -5 deg L 0 deg   MMT:   Knee flexion: R 4-/5 L 4+/5  Knee extension: R 3+/5 L 4/5  Special tests:   VMO tone: R poor L fair       Precautions: Anxiety, Bipolar disorder, Depression, Diabetes type 2, Disease of thyroid gland, Hypertension, PTSD, Seizure, Substance abuse (HCC), Suicide attempt (HCC), LATEX ALLERGY; R TKA on   *HEP: [id number]   Manuals 4/25 4/29 5/6 5/9 5/16  6/3 6/6     STM                  PROM (flex & ext)   done  done done done  done  done                                             Neuro Re-Ed                   Pt education Prognosis, diagnosis, HEP                 Bridges nv 2x4 2x10 2x10 2x10  2x10  2x10     Clamshells            Quad set 5\"x20 5\"x20 5\"x20 5\"x20 10\"x10 10\"x10 10\"x10     SLS                  HS stretch nv Seated 20\"x3 Seated 20\"x3 Seated 20\"x2 Seated 20\"x2 Seated 20\"x2 Seated 20\"x2     Calf stretch w/ strap nv Seated 20\"x3 Seated 20\"x3 Seated 20\"x2 Seated 20\"x2 Seated 20\"x2 Seated 20\"x2    SLR  nv attempted     attempted 4x5 3x5     Heel slides 2x10  5\"x20 5\"x30 5\"x20 5\"x20 5\"x20  5\"x20     Anterior tibial translation on step      10\"x10 10\"x10    Ther Ex                   LAQ nv 4x5 4x5 4x5 2x10  2x10  1# 2x10     SAQ nv 4x5 2x5  np 3x6  2x10  2x10     Standing HS curls                   Bike (ROM) nv   nv 5' rocking Post 5' rocking Post 5' rocking Post 5' rocking     Resisted TKE     Pink 5\"x20  Pink 5\"x20 Pink 5\"x20  Pink 5\"x20  Pink 5\"x20                         Ther Activity                   STS  nv 2 foam, 3x5 1 foam, 2x10  1 foam, 2x10  1 foam, 2x10 2x10  2x10  " "   Side steps                   Step ups (FW)            4\" nv  4\" x5     Step ups (LAT)                   Standing HR  nv 3x10 3x10 3x10 3x10  3x10  3x10     Modalities                   cryotherapy  10' post Declined, will do at home Tolerated 5'            Gait training       performed                    "

## 2024-06-13 ENCOUNTER — OFFICE VISIT (OUTPATIENT)
Dept: OBGYN CLINIC | Facility: MEDICAL CENTER | Age: 59
End: 2024-06-13

## 2024-06-13 ENCOUNTER — APPOINTMENT (OUTPATIENT)
Dept: PHYSICAL THERAPY | Facility: REHABILITATION | Age: 59
End: 2024-06-13
Payer: COMMERCIAL

## 2024-06-13 VITALS
BODY MASS INDEX: 30.79 KG/M2 | DIASTOLIC BLOOD PRESSURE: 81 MMHG | WEIGHT: 173.8 LBS | HEIGHT: 63 IN | SYSTOLIC BLOOD PRESSURE: 159 MMHG | HEART RATE: 73 BPM

## 2024-06-13 DIAGNOSIS — Z96.651 STATUS POST TOTAL KNEE REPLACEMENT, RIGHT: Primary | ICD-10-CM

## 2024-06-13 PROCEDURE — 99024 POSTOP FOLLOW-UP VISIT: CPT | Performed by: ORTHOPAEDIC SURGERY

## 2024-06-13 NOTE — PROGRESS NOTES
Assessment/Plan:  1. Status post total knee replacement, right      No orders of the defined types were placed in this encounter.      Patient is 10 weeks status post right TKA on 4/1/24 with Dr. Vincent.   Continue PT. Encouraged patient to work on her motion.   Pain control prn-OTC pain medication   Patient should call ahead for abx prior to dental appts.   Encouraged patient to follow up with Dr. Vincent in the next few days to review her concerns.     Return for post op check with [facility].    I answered all of the patient's questions during the visit and provided education of the patient's condition during the visit.  The patient verbalized understanding of the information given and agrees with the plan.  This note was dictated using Druva software.  It may contain errors including improperly dictated words.  Please contact physician directly for any questions.    Subjective   Chief Complaint:   Chief Complaint   Patient presents with    Right Knee - Post-op, Follow-up       Bhavana Smith is a 58 y.o. female who presents for 10 week follow up s/p right TKA with Dr Vincent. Patient reports she continues to have a lot of pain in the right knee. Patient states the knee feels stiff to her. She reports she is getting sharp pain in the knee. Patient continues to take oxycodone for pain as needed. She is attending PT.     Review of Systems  ROS:    See HPI for musculoskeletal review.   All other systems reviewed are negative     History:  Past Medical History:   Diagnosis Date    Addiction to drug (HCC)     Adjustment disorder     Alcohol abuse     Alcoholism (HCC)     Anxiety     Bipolar disorder (HCC)     Bowel obstruction (HCC)     Depression     Diabetes mellitus (HCC)     Diabetes type 2, controlled (HCC)     Disease of thyroid gland     Gastritis     Head injury     Heart palpitations     Hyperlipidemia     Hypertension     Hypothyroidism     Psychiatric illness     PTSD (post-traumatic stress disorder)      Seizure (HCC)     Seizures (HCC)     Sleep difficulties     Substance abuse (HCC)     Suicide attempt (HCC)     Withdrawal symptoms, drug or narcotic (HCC)      Past Surgical History:   Procedure Laterality Date    ABDOMINAL SURGERY      APPENDECTOMY      BOWEL RESECTION      CHOLECYSTECTOMY      ESOPHAGOGASTRODUODENOSCOPY N/A 05/18/2018    Procedure: ESOPHAGOGASTRODUODENOSCOPY (EGD) with bx;  Surgeon: Lulu West DO;  Location: [facility];  Service: Gastroenterology    HYSTERECTOMY      KNEE SURGERY Bilateral 1991    RI ARTHRP KNE CONDYLE&PLATU MEDIAL&LAT COMPARTMENTS Right 4/1/2024    Procedure: ARTHROPLASTY KNEE TOTAL- possible same day;  Surgeon: Rosas Vincent DO;  Location: [facility];  Service: Orthopedics     Social History   Social History     Substance and Sexual Activity   Alcohol Use Not Currently    Alcohol/week: 6.0 standard drinks of alcohol    Types: 6 Cans of beer per week    Comment: last drink on 08/15/20     Social History     Substance and Sexual Activity   Drug Use Not Currently     Social History     Tobacco Use   Smoking Status Every Day    Current packs/day: 1.00    Average packs/day: 1 pack/day for 25.0 years (25.0 ttl pk-yrs)    Types: Cigarettes    Passive exposure: Current   Smokeless Tobacco Never   Tobacco Comments    smokes like 5 a day(06/24/2022), smokes 3 cigarettes per day (3/19/24) last smoked 3.31.24     Family History:   Family History   Problem Relation Age of Onset    Bipolar disorder Mother     Cancer Father     Diabetes Father        Current Outpatient Medications on File Prior to Visit   Medication Sig Dispense Refill    acetaminophen (TYLENOL) 500 mg tablet Take 2 tablets (1,000 mg total) by mouth every 8 (eight) hours 60 tablet 0    Alcohol Swabs 70 % PADS May substitute brand based on insurance coverage. Check glucose TID. 100 each 0    aluminum-magnesium hydroxide-simethicone (MAALOX) 6275-2975-588 mg/30 mL suspension Take 30 mL by mouth every 4 (four) hours  as needed for indigestion or heartburn (gas) 355 mL 0    ARIPiprazole (ABILIFY) 10 mg tablet Take 1 tablet (10 mg total) by mouth daily (Patient not taking: Reported on 5/13/2024) 30 tablet 0    ascorbic acid (VITAMIN C) 500 mg tablet TAKE 1 TABLET(500 MG) BY MOUTH TWICE DAILY 60 tablet 1    aspirin (ECOTRIN LOW STRENGTH) 81 mg EC tablet Take 1 tablet (81 mg total) by mouth 2 (two) times a day 60 tablet 0    atorvastatin (LIPITOR) 20 mg tablet Take 1 tablet (20 mg total) by mouth daily 90 tablet 1    Blood Glucose Monitoring Suppl (OneTouch Verio Reflect) w/Device KIT May substitute brand based on insurance coverage. Check glucose TID. 1 kit 0    buPROPion (WELLBUTRIN XL) 150 mg 24 hr tablet Take 150 mg by mouth every morning      celecoxib (CeleBREX) 200 mg capsule TAKE 1 CAPSULE(200 MG) BY MOUTH TWICE DAILY 60 capsule 0    cholecalciferol (VITAMIN D3) 1,000 units tablet Take 2 tablets (2,000 Units total) by mouth daily (Patient not taking: Reported on 5/13/2024) 60 tablet 1    clonazePAM (KlonoPIN) 0.5 mg tablet Take 1 tablet (0.5 mg total) by mouth daily as needed for seizures 30 tablet 1    Diclofenac Sodium (VOLTAREN) 1 % Apply 2 g topically 4 (four) times a day 350 g 1    dicyclomine (BENTYL) 10 mg capsule Take 2 capsules (20 mg total) by mouth 3 (three) times a day as needed (Cramping) (Patient not taking: Reported on 3/20/2024) 20 capsule 0    docusate sodium (COLACE) 100 mg capsule Take 1 capsule (100 mg total) by mouth 2 (two) times a day for 14 days 28 capsule 0    ergocalciferol (VITAMIN D2) 50,000 units TAKE 1 CAPSULE BY MOUTH ONCE  WEEKLY 15 capsule 2    escitalopram (LEXAPRO) 10 mg tablet Take 1 tablet (10 mg total) by mouth daily 90 tablet 1    ferrous sulfate 324 (65 Fe) mg TAKE 1 TABLET(324 MG) BY MOUTH TWICE DAILY BEFORE MEALS 30 tablet 5    folic acid (FOLVITE) 1 mg tablet TAKE 1 TABLET BY MOUTH EVERY DAY 30 tablet 1    gabapentin (NEURONTIN) 800 mg tablet TAKE 1 TABLET BY MOUTH 3 TIMES  DAILY  300 tablet 1    hydroCHLOROthiazide 12.5 mg tablet TAKE 1 TABLET BY MOUTH DAILY 100 tablet 1    insulin glargine (LANTUS) 100 units/mL subcutaneous injection Inject 25 Units under the skin daily at bedtime (Patient not taking: Reported on 5/13/2024) 10 mL 0    levothyroxine 25 mcg tablet Take 1 tablet (25 mcg total) by mouth daily in the early morning 90 tablet 3    lidocaine (Lidoderm) 5 % Apply 1 patch topically over 12 hours daily Remove & Discard patch within 12 hours or as directed by MD Contreras patch 1    linaCLOtide 145 MCG CAPS Take 1 capsule (145 mcg total) by mouth in the morning (Patient not taking: Reported on 5/13/2024) 30 capsule 3    metFORMIN (GLUCOPHAGE) 1000 MG tablet TAKE 1 TABLET BY MOUTH TWICE  DAILY WITH MEALS 200 tablet 1    metoprolol tartrate (LOPRESSOR) 50 mg tablet TAKE 1 TABLET BY MOUTH EVERY 12  HOURS 200 tablet 1    Microlet Lancets MISC       Multiple Vitamin (Multivitamin) TABS TAKE 1 TABLET BY MOUTH DAILY 30 tablet 1    Multiple Vitamins-Minerals (multivitamin with minerals) tablet Take 1 tablet by mouth daily 30 tablet 1    naloxone (NARCAN) 4 mg/0.1 mL nasal spray Administer 1 spray into a nostril. If no response after 2-3 minutes, give another dose in the other nostril using a new spray. (Patient not taking: Reported on 5/21/2024) 1 each 1    ondansetron (ZOFRAN) 4 mg tablet Take 4 mg by mouth every 8 (eight) hours as needed for nausea or vomiting (Patient not taking: Reported on 5/21/2024)      ondansetron (ZOFRAN-ODT) 4 mg disintegrating tablet Take 1 tablet (4 mg total) by mouth every 6 (six) hours as needed for nausea or vomiting (Patient not taking: Reported on 5/21/2024) 20 tablet 0    OneTouch Delica Lancets 33G MISC May substitute brand based on insurance coverage. Check glucose TID. 100 each 0    oxyCODONE (Roxicodone) 5 immediate release tablet Take 1 tablet (5 mg total) by mouth every 4 (four) hours as needed for moderate pain or severe pain for up to 10 days Max Daily  "Amount: 30 mg 42 tablet 0    pantoprazole (PROTONIX) 40 mg tablet Take 1 tablet (40 mg total) by mouth 2 (two) times a day 180 tablet 2    polyethylene glycol (MIRALAX) 17 g packet Take 17 g by mouth daily (Patient not taking: Reported on 5/13/2024) 30 each 0    senna-docusate sodium (SENOKOT S) 8.6-50 mg per tablet Take 1 tablet by mouth daily (Patient not taking: Reported on 5/21/2024) 30 tablet 0    sucralfate (CARAFATE) 1 g tablet TAKE 1 TABLET BY MOUTH 4 TIMES  DAILY (Patient not taking: Reported on 5/21/2024) 400 tablet 1    SUMAtriptan (IMITREX) 25 mg tablet TAKE 1 TABLET(25 MG) BY MOUTH DAILY AS NEEDED FOR MIGRAINE. DO NOT TAKE MORE THAN 3 DOSES IN 1 WEEK 10 tablet 1    topiramate (TOPAMAX) 25 mg tablet Take 25 mg by mouth 2 (two) times a day      traZODone (DESYREL) 100 mg tablet TAKE 2 TABLETS(200 MG) BY MOUTH DAILY AT BEDTIME 60 tablet 5     No current facility-administered medications on file prior to visit.     Allergies   Allergen Reactions    Latex Itching and Rash    Losartan Cough        Objective     /81   Pulse 73   Ht 5' 3\" (1.6 m)   Wt 78.8 kg (173 lb 12.8 oz)   BMI 30.79 kg/m²      PE:  AAOx 3  WDWN  Hearing intact, no drainage from eyes  no audible wheezing  no abdominal distension  LE compartments soft, AT/GS intact    Ortho Exam:  right Knee:   INC: healed, No erythema, mild swelling  AROM:5 - 90 degrees  PROM: 2- 90 degrees       Scribe Attestation      I,:  Yumiko Lawrence PA-C am acting as a scribe while in the presence of the attending physician.:       I,:  Odilia Chacon DO personally performed the services described in this documentation    as scribed in my presence.:                      "

## 2024-06-17 ENCOUNTER — OFFICE VISIT (OUTPATIENT)
Dept: FAMILY MEDICINE CLINIC | Facility: CLINIC | Age: 59
End: 2024-06-17
Payer: COMMERCIAL

## 2024-06-17 ENCOUNTER — TELEPHONE (OUTPATIENT)
Age: 59
End: 2024-06-17

## 2024-06-17 ENCOUNTER — APPOINTMENT (OUTPATIENT)
Dept: PHYSICAL THERAPY | Facility: REHABILITATION | Age: 59
End: 2024-06-17
Payer: COMMERCIAL

## 2024-06-17 VITALS
BODY MASS INDEX: 29.62 KG/M2 | TEMPERATURE: 98.3 F | WEIGHT: 167.2 LBS | SYSTOLIC BLOOD PRESSURE: 134 MMHG | OXYGEN SATURATION: 95 % | DIASTOLIC BLOOD PRESSURE: 88 MMHG | HEART RATE: 77 BPM

## 2024-06-17 DIAGNOSIS — M17.11 PRIMARY OSTEOARTHRITIS OF RIGHT KNEE: ICD-10-CM

## 2024-06-17 DIAGNOSIS — I10 ESSENTIAL HYPERTENSION: ICD-10-CM

## 2024-06-17 DIAGNOSIS — F43.12 POST-TRAUMATIC STRESS DISORDER, CHRONIC: Chronic | ICD-10-CM

## 2024-06-17 DIAGNOSIS — F43.9 STRESS AT HOME: ICD-10-CM

## 2024-06-17 DIAGNOSIS — R00.2 HEART PALPITATIONS: Primary | ICD-10-CM

## 2024-06-17 PROCEDURE — G2211 COMPLEX E/M VISIT ADD ON: HCPCS | Performed by: NURSE PRACTITIONER

## 2024-06-17 PROCEDURE — 99214 OFFICE O/P EST MOD 30 MIN: CPT | Performed by: NURSE PRACTITIONER

## 2024-06-17 NOTE — TELEPHONE ENCOUNTER
Received call from patient's SO Lavon Titus to confirm patient's scheduled visit for her Holter monitor. RN confirmed it is scheduled for 7/1 at 1:45 PM and location.
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
## 2024-06-17 NOTE — LETTER
June 17, 2024     Patient: Bhavana Smith  YOB: 1965  Date of Visit: 6/17/2024      To Whom it May Concern:    Bhavana Smith is under my professional care. Bhavana was seen in my office on 6/17/2024. Bhavana has an orthopedic condition and causes difficulty walking. Please allow her to have washer and dryer in her unit. She has great difficulty getting to the complex laundry room due to her orthopedic condition.    If you have any questions or concerns, please don't hesitate to call.         Sincerely,          YONG Wise        CC: No Recipients

## 2024-06-17 NOTE — LETTER
June 17, 2024     Patient: Bhavana Smith  YOB: 1965  Date of Visit: 6/17/2024      To Whom it May Concern:    Bhavana Smith is under my professional care. Bhavana was seen in my office on 6/17/2024.  I am treating patient for mental health conditions. I feel an emotional support animal (canine) is medically necessary.     If you have any questions or concerns, please don't hesitate to call.         Sincerely,          YONG Wise        CC: No Recipients

## 2024-06-18 ENCOUNTER — OFFICE VISIT (OUTPATIENT)
Dept: OBGYN CLINIC | Facility: MEDICAL CENTER | Age: 59
End: 2024-06-18

## 2024-06-18 VITALS
DIASTOLIC BLOOD PRESSURE: 80 MMHG | HEART RATE: 80 BPM | HEIGHT: 63 IN | BODY MASS INDEX: 29.41 KG/M2 | WEIGHT: 166 LBS | SYSTOLIC BLOOD PRESSURE: 124 MMHG

## 2024-06-18 DIAGNOSIS — M24.661 ARTHROFIBROSIS OF KNEE JOINT, RIGHT: Primary | ICD-10-CM

## 2024-06-18 DIAGNOSIS — M17.11 PRIMARY OSTEOARTHRITIS OF RIGHT KNEE: ICD-10-CM

## 2024-06-18 PROCEDURE — 99024 POSTOP FOLLOW-UP VISIT: CPT | Performed by: STUDENT IN AN ORGANIZED HEALTH CARE EDUCATION/TRAINING PROGRAM

## 2024-06-18 RX ORDER — ACETAMINOPHEN 325 MG/1
975 TABLET ORAL ONCE
OUTPATIENT
Start: 2024-07-01 | End: 2024-06-18

## 2024-06-18 RX ORDER — OXYCODONE HYDROCHLORIDE 5 MG/1
5 TABLET ORAL EVERY 4 HOURS PRN
Qty: 42 TABLET | Refills: 0 | Status: SHIPPED | OUTPATIENT
Start: 2024-06-18 | End: 2024-06-25 | Stop reason: SDUPTHER

## 2024-06-18 NOTE — PROGRESS NOTES
Subjective:    Bhavana is a pleasant 58-year-old female presenting 11 weeks status post right total knee arthroplasty.  She states that she is experiencing significant pain to the anterior aspect of her knee as well as at the lateral aspect.  She notes that positional positions from sitting to standing will exacerbate her pain anteriorly and does have the knee with ambulation.  She does also note stiffness into her knee and states that range of motion is painful.  She does utilize a cane for ambulation.  She notes that she was at physical therapy approximately 10 days ago and states at that time she was unable to do a straight leg raise.  She denies any injury or anything abnormal that they that would result in this inability.  She does utilize oxycodone for pain and notes that this has failed to provide her with lasting relief.     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  I do believe that more aggressive treatment option is warranted at this time due to her lack of progression of physical therapy and her overall clinical exam today.  I did discuss a manipulation under anesthesia for her right knee.  This would be performed sooner than later with expectations of July 1, 2024.  It is our hope that following this manipulation she will then progress with physical therapy to subsequently increase her range of motion and overall function. Risks and benefits of the procedure to include but not limited to bleeding, damage to surrounding structures, hardware failure, instability, fracture, dislocation,  need for further surgery, continued pain, and stiffness was discussed with the patient.  She did provide signed consent met with my surgical scheduler set up a date and time with expectation of July 1, 2024.  I will see her back on date of the procedure.        Scribe Attestation      I,:  Emil Conway am acting as a scribe while in the presence of the attending physician.:       I,:  Rosas Vincent, DO personally performed the services described in this documentation    as scribed in my presence.:

## 2024-06-19 NOTE — PROGRESS NOTES
PT Re-Evaluation     Today's date: 2024  Patient name: Bhavana Smith  : 1965  MRN: [id number]  Referring provider: Rosemarie Bianchi PA-C  Dx:   No diagnosis found.                 Assessment  Impairments: abnormal gait, abnormal muscle tone, abnormal or restricted ROM, abnormal movement, activity intolerance, impaired balance, impaired physical strength, lacks appropriate home exercise program, pain with function, weight-bearing intolerance, poor posture  and poor body mechanics  Symptom irritability: high    Assessment details: Pt has been present in PT for 8 visits 2/2 R knee pain s/p R TKA on  limiting pt activity tolerance. Pt has made improvements in thigh musculature flexibility resulting in gains with knee AROM. Pt has improved with quad strength however continues to have extension lag during SLR. Pt has poor tolerance to sessions due to random sharp pains. Pt continues to benefit from MTT demonstrating improvements in mobility and reduction of stiffness. Pt continues to benefit from therapeutic exercise progressions to maximize mobility and function.   Functional limitations continue to include sitting, standing, walking, stair negotiation, and ADLs. Provided updated copy of HEP to aide with compliance with good understanding. Pt was independent prior to surgery and she would benefit from skilled PT interventions to address above mentioned impairments, activity limitations, and participation restrictions to reduce risk for injury and return to prior activity tolerance.     Understanding of Dx/Px/POC: good     Prognosis: good    Goals  STG  1. Improve knee AROM to WNL from gains in flexibility PROGRESSING   2. <4/10 pain with >30 min walking without AD from gains in positional tolerance PROGRESSING   3. <20 sec on TUG without AD to reduce risk for falls PROGRESSING   LTG  1. SLS >25 sec to maximize balance on uneven surfaces PROGRESSING   2. Increase knee MMT >4+/5 in all planes to improve  unilateral stability PROGRESSING   3. Increase FOTO score to goal to facilitate return to PLOF PROGRESSING     Plan  Patient would benefit from: skilled physical therapy  Planned modality interventions: cryotherapy    Planned therapy interventions: abdominal trunk stabilization, ADL training, activity modification, balance, IASTM, joint mobilization, manual therapy, balance/weight bearing training, body mechanics training, coordination, dressing changes, flexibility, functional ROM exercises, gait training, home exercise program, neuromuscular re-education, patient education, postural training, strengthening, stretching, therapeutic activities, therapeutic exercise and transfer training    Frequency: 2x week  Duration in weeks: 8  Plan of Care beginning date: 4/25/2024  Plan of Care expiration date: 7/18/2024  Treatment plan discussed with: patient  Plan details: Thank you for referring Bhavana Smith to PT at [facility] and for the opportunity to coordinate care.          Subjective  Pt reports some improvements since beginning PT, however pain continues to be CC. Pt underwent R TKA on 4/1. Pain is described as aching, occasionally sharp shooting, around posterior, medial, and lateral knee and currently rated 7/10. Patient reports numbness around incision site. Pain improves to 4/10 with rest, ice, and elevation. Pain increases to 10/10 with resting in knee extension, sitting (<10 min), STS transfers, standing (<10 min), walking (counter surfing during household ambulation, SPC with community ambulation), stairs (step to pattern ascending and descending) and sleeping (3+ nightly disturbances/week). Patient has increased difficulty with ADLs such as driving, cooking, cleaning, bending down, and lifting. Patient is on disability. Prior to dysfunction, patient was walking without assistance and performing ADLs without assistance which she now has difficulty with. Patient goals for PT are to reduce pain and return to  "PLOF. Pt lives with boyfriend who can provide assistance when necessary. Pt has 2 flights of stairs at home.   Objective      Posture: slight knee flexion, increased reliance on SPC resulting in lateral trunk lean  5x STS: not performed  SLS: R 7 sec L 25 sec  TU sec with SPC  Palpation: +TTP medial and lateral incision sites, incision healing well  ROM:   Knee flexion: R 78 deg L 140 deg   Knee extension: R -5 deg L 0 deg   MMT:   Knee flexion: R 4-/5 L 4+/5  Knee extension: R 3+/5 L 4/5  Special tests:   VMO tone: R poor L fair       Precautions: Anxiety, Bipolar disorder, Depression, Diabetes type 2, Disease of thyroid gland, Hypertension, PTSD, Seizure, Substance abuse (HCC), Suicide attempt (HCC), LATEX ALLERGY; R TKA on   *HEP: [id number]   Manuals 4/25 4/29 5/6 5/9 5/16  6/3 6/6     STM                  PROM (flex & ext)   done  done done done  done  done                                             Neuro Re-Ed                   Pt education Prognosis, diagnosis, HEP                 Bridges nv 2x4 2x10 2x10 2x10  2x10  2x10     Clamshells            Quad set 5\"x20 5\"x20 5\"x20 5\"x20 10\"x10 10\"x10 10\"x10     SLS                  HS stretch nv Seated 20\"x3 Seated 20\"x3 Seated 20\"x2 Seated 20\"x2 Seated 20\"x2 Seated 20\"x2     Calf stretch w/ strap nv Seated 20\"x3 Seated 20\"x3 Seated 20\"x2 Seated 20\"x2 Seated 20\"x2 Seated 20\"x2    SLR  nv attempted     attempted 4x5 3x5     Heel slides 2x10  5\"x20 5\"x30 5\"x20 5\"x20 5\"x20  5\"x20     Anterior tibial translation on step      10\"x10 10\"x10    Ther Ex                   LAQ nv 4x5 4x5 4x5 2x10  2x10  1# 2x10     SAQ nv 4x5 2x5  np 3x6  2x10  2x10     Standing HS curls                   Bike (ROM) nv   nv 5' rocking Post 5' rocking Post 5' rocking Post 5' rocking     Resisted TKE     Pink 5\"x20  Pink 5\"x20 Pink 5\"x20  Pink 5\"x20  Pink 5\"x20                         Ther Activity                   STS  nv 2 foam, 3x5 1 foam, 2x10  1 foam, 2x10  1 foam, 2x10 2x10  2x10  " "   Side steps                   Step ups (FW)            4\" nv  4\" x5     Step ups (LAT)                   Standing HR  nv 3x10 3x10 3x10 3x10  3x10  3x10     Modalities                   cryotherapy  10' post Declined, will do at home Tolerated 5'            Gait training       performed                    "

## 2024-06-20 ENCOUNTER — APPOINTMENT (OUTPATIENT)
Dept: PHYSICAL THERAPY | Facility: REHABILITATION | Age: 59
End: 2024-06-20
Payer: COMMERCIAL

## 2024-06-25 DIAGNOSIS — M17.11 PRIMARY OSTEOARTHRITIS OF RIGHT KNEE: ICD-10-CM

## 2024-06-25 RX ORDER — OXYCODONE HYDROCHLORIDE 5 MG/1
5 TABLET ORAL EVERY 4 HOURS PRN
Qty: 42 TABLET | Refills: 0 | Status: SHIPPED | OUTPATIENT
Start: 2024-06-25 | End: 2024-07-05

## 2024-06-25 NOTE — TELEPHONE ENCOUNTER
Reason for call:   [x] Refill   [] Prior Auth  [] Other:     Office:   [x] PCP/Provider - Rosemarie Bianchi PA-C   [] Specialty/Provider -     Medication:       Does the patient have enough for 3 days?   [] Yes   [x] No - Send as HP to POD

## 2024-06-25 NOTE — TELEPHONE ENCOUNTER
Patient called to check the status of her medication she is in a lot of pain.  I did advise it was sent at high priority for review

## 2024-06-27 ENCOUNTER — ANESTHESIA EVENT (OUTPATIENT)
Dept: PERIOP | Facility: HOSPITAL | Age: 59
End: 2024-06-27
Payer: COMMERCIAL

## 2024-06-27 NOTE — PRE-PROCEDURE INSTRUCTIONS
Pre-Surgery Instructions:   Medication Instructions    acetaminophen (TYLENOL) 500 mg tablet Uses PRN- OK to take day of surgery    ARIPiprazole (ABILIFY) 10 mg tablet Take day of surgery.    ascorbic acid (VITAMIN C) 500 mg tablet Instructions provided by MD    aspirin (ECOTRIN LOW STRENGTH) 81 mg EC tablet Instructions provided by MD    atorvastatin (LIPITOR) 20 mg tablet Take day of surgery.    buPROPion (WELLBUTRIN XL) 150 mg 24 hr tablet Take day of surgery.    celecoxib (CeleBREX) 200 mg capsule Stop taking 3 days prior to surgery.    clonazePAM (KlonoPIN) 0.5 mg tablet Uses PRN- OK to take day of surgery    Diclofenac Sodium (VOLTAREN) 1 % Hold day of surgery.    docusate sodium (COLACE) 100 mg capsule Hold day of surgery.    ergocalciferol (VITAMIN D2) 50,000 units Instructions provided by MD    escitalopram (LEXAPRO) 10 mg tablet Take day of surgery.    ferrous sulfate 324 (65 Fe) mg Instructions provided by MD    folic acid (FOLVITE) 1 mg tablet Instructions provided by MD    gabapentin (NEURONTIN) 800 mg tablet Take day of surgery.    hydroCHLOROthiazide 12.5 mg tablet Hold day of surgery.    levothyroxine 25 mcg tablet Take day of surgery.    lidocaine (Lidoderm) 5 % Hold day of surgery.    linaCLOtide 145 MCG CAPS Take day of surgery.    metFORMIN (GLUCOPHAGE) 1000 MG tablet Hold day of surgery.    metoprolol tartrate (LOPRESSOR) 50 mg tablet Take day of surgery.    Multiple Vitamin (Multivitamin) TABS Instructions provided by MD    Multiple Vitamins-Minerals (multivitamin with minerals) tablet Instructions provided by MD    naloxone (NARCAN) 4 mg/0.1 mL nasal spray PRN    ondansetron (ZOFRAN-ODT) 4 mg disintegrating tablet Uses PRN- OK to take day of surgery    oxyCODONE (Roxicodone) 5 immediate release tablet Uses PRN- OK to take day of surgery    pantoprazole (PROTONIX) 40 mg tablet Take day of surgery.    SUMAtriptan (IMITREX) 25 mg tablet Uses PRN- OK to take day of surgery    topiramate (TOPAMAX)  25 mg tablet Take day of surgery.    traZODone (DESYREL) 100 mg tablet Take night before surgery   Medication instructions for day surgery reviewed. Please use only a sip of water to take your instructed medications. Avoid all over the counter vitamins, supplements and NSAIDS for one week prior to surgery per anesthesia guidelines. Tylenol is ok to take as needed.     You will receive a call one business day prior to surgery with an arrival time and hospital directions. If your surgery is scheduled on a Monday, the hospital will be calling you on the Friday prior to your surgery. If you have not heard from anyone by 8pm, please call the hospital supervisor through the hospital  at [phone]. ([place] [phone] or [place] [phone]).    Do not eat or drink anything after midnight the night before your surgery, including candy, mints, lifesavers, or chewing gum. Do not drink alcohol 24hrs before your surgery. Try not to smoke at least 24hrs before your surgery.       Follow the pre surgery showering instructions as listed in the “My Surgical Experience Booklet” or otherwise provided by your surgeon's office. Do not use a blade to shave the surgical area 1 week before surgery. It is okay to use a clean electric clippers up to 24 hours before surgery. Do not apply any lotions, creams, including makeup, cologne, deodorant, or perfumes after showering on the day of your surgery. Do not use dry shampoo, hair spray, hair gel, or any type of hair products.     No contact lenses, eye make-up, or artificial eyelashes. Remove nail polish, including gel polish, and any artificial, gel, or acrylic nails if possible. Remove all jewelry including rings and body piercing jewelry.     Wear causal clothing that is easy to take on and off. Consider your type of surgery.    Keep any valuables, jewelry, piercings at home. Please bring any specially ordered equipment (sling, braces) if indicated.    Arrange for a  responsible person to drive you to and from the hospital on the day of your surgery. Please confirm the visitor policy for the day of your procedure when you receive your phone call with an arrival time.     Call the surgeon's office with any new illnesses, exposures, or additional questions prior to surgery.    Please reference your “My Surgical Experience Booklet” for additional information to prepare for your upcoming surgery.     Advise Smoking Cessation Education not interested.

## 2024-06-30 PROBLEM — M24.661 ARTHROFIBROSIS OF KNEE JOINT, RIGHT: Status: ACTIVE | Noted: 2024-06-30

## 2024-06-30 NOTE — DISCHARGE INSTR - AVS FIRST PAGE
Dr. Vincent Knee Replacement    What to Expect/Activity  It is normal to have some discomfort in your knee for several days to weeks.  You are weight bearing as tolerated to your operative leg with assist devices.  Please use crutches/walker when ambulating until your follow-up  Swelling and discomfort in the knee is normal for several days after surgery. For the first 2-3 days, use ice around the knee to help. Use for 20-30 minutes every 1-2 hours for 48 hours, while awake. You may continue beyond 48 hours as needed.  Place one or two pillows underneath your calf, not your knee, to reduce swelling.  Physical therapy on your own at home should start as soon as possible (see below). Please perform heel slides and extension exercises on your own as well (see diagram).  Please use incentive spirometer 10 times per hour while awake (see diagram).      Pain Management/Medications  You may resume your usual medications.  Please take the following medications:  Pain medication:  Narcotic: Take as directed  NSAID/Anti-inflammatory: Take as directed  Tylenol 1000mg every 8 hours  Zofran (ondasetron) - 4mg every 8 hours as needed for nausea  Stool softeners (senna/colace) - take daily to prevent constipation as narcotic pain medication causes constipation  Antibiotic - take as directed if prescribed   If you have questions or pain concerns, please contact the office. Pain medication cannot remove all post-operative pain.    Follow up/Call if:  The findings of your surgery will be explained to you and your family immediately after surgery. However, in the post-operative period, during recovery from anesthesia you may not fully remember or fully understand what was said. This will be again gone over when you return for your post-op appointment.  Please contact Dr. Vincent's office if you experience the following:  Excessive bleeding (bleeding through your dressing)  Fever greater than 101 degrees F after 48 hours (low grade  fevers the day or two after surgery are normal)  Persistent nausea or vomiting  Decreased sensation or discoloration of the operative limb  Pain or swelling that is getting worse and not better with medication    Dr. Vincent's Office Contact: [phone]

## 2024-07-01 ENCOUNTER — ANESTHESIA (OUTPATIENT)
Dept: PERIOP | Facility: HOSPITAL | Age: 59
End: 2024-07-01
Payer: COMMERCIAL

## 2024-07-01 ENCOUNTER — HOSPITAL ENCOUNTER (OUTPATIENT)
Facility: HOSPITAL | Age: 59
Setting detail: OUTPATIENT SURGERY
Discharge: HOME | End: 2024-07-01
Attending: STUDENT IN AN ORGANIZED HEALTH CARE EDUCATION/TRAINING PROGRAM | Admitting: STUDENT IN AN ORGANIZED HEALTH CARE EDUCATION/TRAINING PROGRAM
Payer: COMMERCIAL

## 2024-07-01 VITALS
SYSTOLIC BLOOD PRESSURE: 134 MMHG | HEART RATE: 61 BPM | WEIGHT: 167.33 LBS | DIASTOLIC BLOOD PRESSURE: 74 MMHG | TEMPERATURE: 98.6 F | RESPIRATION RATE: 16 BRPM | BODY MASS INDEX: 29.64 KG/M2 | OXYGEN SATURATION: 97 %

## 2024-07-01 DIAGNOSIS — M24.661 ARTHROFIBROSIS OF KNEE JOINT, RIGHT: Primary | ICD-10-CM

## 2024-07-01 LAB
GLUCOSE SERPL-MCNC: 114 MG/DL (ref 65–140)
GLUCOSE SERPL-MCNC: 120 MG/DL (ref 65–140)

## 2024-07-01 PROCEDURE — 27570 FIXATION OF KNEE JOINT: CPT | Performed by: STUDENT IN AN ORGANIZED HEALTH CARE EDUCATION/TRAINING PROGRAM

## 2024-07-01 PROCEDURE — 82948 REAGENT STRIP/BLOOD GLUCOSE: CPT

## 2024-07-01 PROCEDURE — C9290 INJ, BUPIVACAINE LIPOSOME: HCPCS | Performed by: ANESTHESIOLOGY

## 2024-07-01 RX ORDER — SODIUM CHLORIDE 9 MG/ML
INJECTION, SOLUTION INTRAVENOUS CONTINUOUS PRN
Status: DISCONTINUED | OUTPATIENT
Start: 2024-07-01 | End: 2024-07-01

## 2024-07-01 RX ORDER — ACETAMINOPHEN 500 MG
1000 TABLET ORAL EVERY 8 HOURS
Qty: 60 TABLET | Refills: 0 | Status: SHIPPED | OUTPATIENT
Start: 2024-07-01

## 2024-07-01 RX ORDER — ONDANSETRON 2 MG/ML
4 INJECTION INTRAMUSCULAR; INTRAVENOUS EVERY 6 HOURS PRN
Status: DISCONTINUED | OUTPATIENT
Start: 2024-07-01 | End: 2024-07-01 | Stop reason: HOSPADM

## 2024-07-01 RX ORDER — FENTANYL CITRATE 50 UG/ML
INJECTION, SOLUTION INTRAMUSCULAR; INTRAVENOUS
Status: DISCONTINUED | OUTPATIENT
Start: 2024-07-01 | End: 2024-07-01

## 2024-07-01 RX ORDER — OXYCODONE HYDROCHLORIDE 5 MG/1
5 TABLET ORAL EVERY 4 HOURS PRN
Qty: 42 TABLET | Refills: 0 | Status: SHIPPED | OUTPATIENT
Start: 2024-07-01 | End: 2024-07-11

## 2024-07-01 RX ORDER — MIDAZOLAM HYDROCHLORIDE 2 MG/2ML
INJECTION, SOLUTION INTRAMUSCULAR; INTRAVENOUS
Status: DISCONTINUED | OUTPATIENT
Start: 2024-07-01 | End: 2024-07-01

## 2024-07-01 RX ORDER — SODIUM CHLORIDE 9 MG/ML
125 INJECTION, SOLUTION INTRAVENOUS CONTINUOUS
Status: DISCONTINUED | OUTPATIENT
Start: 2024-07-01 | End: 2024-07-01 | Stop reason: HOSPADM

## 2024-07-01 RX ORDER — OXYCODONE HYDROCHLORIDE 5 MG/1
5 TABLET ORAL EVERY 4 HOURS PRN
Status: DISCONTINUED | OUTPATIENT
Start: 2024-07-01 | End: 2024-07-01 | Stop reason: HOSPADM

## 2024-07-01 RX ORDER — ONDANSETRON 4 MG/1
4 TABLET, ORALLY DISINTEGRATING ORAL EVERY 6 HOURS PRN
Qty: 20 TABLET | Refills: 0 | Status: SHIPPED | OUTPATIENT
Start: 2024-07-01

## 2024-07-01 RX ORDER — ONDANSETRON 2 MG/ML
4 INJECTION INTRAMUSCULAR; INTRAVENOUS ONCE AS NEEDED
Status: DISCONTINUED | OUTPATIENT
Start: 2024-07-01 | End: 2024-07-01 | Stop reason: HOSPADM

## 2024-07-01 RX ORDER — ACETAMINOPHEN 325 MG/1
975 TABLET ORAL EVERY 8 HOURS
Status: DISCONTINUED | OUTPATIENT
Start: 2024-07-01 | End: 2024-07-01 | Stop reason: HOSPADM

## 2024-07-01 RX ORDER — FENTANYL CITRATE/PF 50 MCG/ML
50 SYRINGE (ML) INJECTION
Status: DISCONTINUED | OUTPATIENT
Start: 2024-07-01 | End: 2024-07-01 | Stop reason: HOSPADM

## 2024-07-01 RX ORDER — BUPIVACAINE HYDROCHLORIDE 5 MG/ML
INJECTION, SOLUTION EPIDURAL; INTRACAUDAL
Status: DISCONTINUED | OUTPATIENT
Start: 2024-07-01 | End: 2024-07-01

## 2024-07-01 RX ORDER — ACETAMINOPHEN 325 MG/1
975 TABLET ORAL ONCE
Status: COMPLETED | OUTPATIENT
Start: 2024-07-01 | End: 2024-07-01

## 2024-07-01 RX ORDER — PROPOFOL 10 MG/ML
INJECTION, EMULSION INTRAVENOUS AS NEEDED
Status: DISCONTINUED | OUTPATIENT
Start: 2024-07-01 | End: 2024-07-01

## 2024-07-01 RX ORDER — AMOXICILLIN 250 MG
1 CAPSULE ORAL DAILY
Qty: 30 TABLET | Refills: 0 | Status: SHIPPED | OUTPATIENT
Start: 2024-07-01

## 2024-07-01 RX ADMIN — BUPIVACAINE 10 ML: 13.3 INJECTION, SUSPENSION, LIPOSOMAL INFILTRATION at 07:05

## 2024-07-01 RX ADMIN — MIDAZOLAM 2 MG: 1 INJECTION INTRAMUSCULAR; INTRAVENOUS at 07:05

## 2024-07-01 RX ADMIN — PROPOFOL 100 MG: 10 INJECTION, EMULSION INTRAVENOUS at 07:19

## 2024-07-01 RX ADMIN — ACETAMINOPHEN 975 MG: 325 TABLET, FILM COATED ORAL at 05:37

## 2024-07-01 RX ADMIN — FENTANYL CITRATE 100 MCG: 50 INJECTION INTRAMUSCULAR; INTRAVENOUS at 07:05

## 2024-07-01 RX ADMIN — FENTANYL CITRATE 50 MCG: 50 INJECTION INTRAMUSCULAR; INTRAVENOUS at 07:47

## 2024-07-01 RX ADMIN — BUPIVACAINE HYDROCHLORIDE 10 ML: 5 INJECTION, SOLUTION EPIDURAL; INTRACAUDAL; PERINEURAL at 07:05

## 2024-07-01 RX ADMIN — SODIUM CHLORIDE: 0.9 INJECTION, SOLUTION INTRAVENOUS at 07:17

## 2024-07-01 RX ADMIN — PROPOFOL 20 MG: 10 INJECTION, EMULSION INTRAVENOUS at 07:20

## 2024-07-01 NOTE — PROGRESS NOTES
Discharge teaching done, no questions. Pt sleepy at times instructed to take her Oxy after getting something to eat when she gets home.

## 2024-07-01 NOTE — ANESTHESIA POSTPROCEDURE EVALUATION
Post-Op Assessment Note    CV Status:  Stable  Pain Score: 1    Pain management: adequate       Mental Status:  Alert and awake   Hydration Status:  Euvolemic   PONV Controlled:  Controlled   Airway Patency:  Patent     Post Op Vitals Reviewed: Yes    No anethesia notable event occurred.    Staff: Anesthesiologist               BP      Temp      Pulse     Resp      SpO2      /71   Pulse 75   Temp (!) 97.2 °F (36.2 °C) (Temporal)   Resp 16   Wt 75.9 kg (167 lb 5.3 oz)   SpO2 97%   BMI 29.64 kg/m²

## 2024-07-01 NOTE — ANESTHESIA PROCEDURE NOTES
Peripheral Block    Patient location during procedure: holding area  Start time: 7/1/2024 7:05 AM  Reason for block: at surgeon's request and post-op pain management  Staffing  Performed by: Cindy Aponte CRNA  Authorized by: Franck Toro DO    Preanesthetic Checklist  Completed: patient identified, IV checked, site marked, risks and benefits discussed, surgical consent, monitors and equipment checked, pre-op evaluation and timeout performed  Peripheral Block  Patient position: supine  Prep: ChloraPrep  Patient monitoring: continuous pulse oximetry, frequent blood pressure checks and heart rate  Block type: Adductor Canal  Laterality: right  Injection technique: single-shot  Procedures: ultrasound guided, Ultrasound guidance required for the procedure to increase accuracy and safety of medication placement and decrease risk of complications.  Ultrasound permanent image saved  bupivacaine liposomal (EXPAREL) 1.3 % injection 20 mL - Perineural   10 mL - 7/1/2024 7:05:00 AM  bupivacaine (PF) (MARCAINE) 0.5 % injection 20 mL - Perineural   10 mL - 7/1/2024 7:05:00 AM  midazolam (VERSED) injection 0.5 mg - Intravenous   2 mg - 7/1/2024 7:05:00 AM  fentanyl citrate (PF) 100 MCG/2ML 50 mcg - Intravenous   100 mcg - 7/1/2024 7:05:00 AM  Needle  Needle type: Stimuplex   Needle gauge: 20 G  Needle length: 4 in  Needle localization: ultrasound guidance  Assessment  Injection assessment: frequent aspiration, incremental injection, injected with ease, negative aspiration, no paresthesia on injection, needle tip visualized at all times, negative for heart rate change and no symptoms of intraneural/intravenous injection  Paresthesia pain: none  Post-procedure:  site cleaned  patient tolerated the procedure well with no immediate complications

## 2024-07-01 NOTE — OP NOTE
OPERATIVE REPORT  PATIENT NAME: Bhavana Smith    :  1965  MRN: [id number]  Pt Location: [facility] OR ROOM 02    SURGERY DATE: 2024    Surgeons and Role:     * Rosas Vincent, DO - Primary    Preop Diagnosis:  Arthrofibrosis of knee joint, right [M24.661]    Post-Op Diagnosis Codes:     * Arthrofibrosis of knee joint, right [M24.661]    Procedure(s):  Right - MANIPULATION JOINT KNEE. adductor canal block    Specimen(s):  * No specimens in log *    Estimated Blood Loss:   none    Drains:  * No LDAs found *    Anesthesia Type:   Sedation with adductor canal block     Operative Indications:  Arthrofibrosis of knee joint, right [M24.661]  58F s/p right TKA 24 with post-op arthrofibrosis. I do believe that more aggressive treatment option is warranted at this time due to her lack of progression of physical therapy and her overall clinical exam.  We discussed risk and benefits of manipulation under anesthesia to include but not limited to fracture, continued stiffness, extensor mechanism rupture, and continued pain.  We discussed continued aggressive physical therapy postmanipulation and have discussed this plan with her physical therapist.  Patient has elected to proceed with manipulation under anesthesia with adductor canal block by anesthesia.    Operative Findings:  Pre-manipulation ROM 0-95  Post-manipulation ROM 0-125+ calf to thigh gravity-assisted flexion     Complications:   None    Procedure and Technique:    Patient was seen and examined in the preoperative holding area.  The right lower extremity was marked as the correct site.  An adductor canal block was performed by anesthesia.  Patient was brought to the operating room and remained on the stretcher.  Anesthesia was performed and once patient was asleep we assessed her motion.  Her motion was 0 to 95 degrees of gravity assisted flexion.  We performed a gentle manipulation into flexion.  She had a small amount of scar tissue adhesions that  were broken up and she was then able to achieve approximately 125 degrees of flexion with gravity assistance.  She had calf to thigh motion.  Patient was awoken from anesthesia and taken to the recovery room in stable condition.     I was present for the entire procedure.    Patient Disposition:  PACU       SIGNATURE: Rosas Vincent DO  DATE: July 1, 2024  TIME: 7:44 AM

## 2024-07-01 NOTE — INTERVAL H&P NOTE
H&P reviewed. After examining the patient I find no changes in the patients condition since the H&P had been written. Plan for right knee GARRICK for arthrofibrosis.

## 2024-07-01 NOTE — ANESTHESIA PREPROCEDURE EVALUATION
Procedure:  MANIPULATION JOINT KNEE, adductor canal block (Right: Knee)    Relevant Problems   CARDIO   (+) Essential hypertension   (+) Hyperlipemia   (+) Migraine without aura and without status migrainosus, not intractable      ENDO   (+) Acquired hypothyroidism   (+) Hypothyroidism   (+) Type 2 diabetes mellitus with obesity  (HCC)      GI/HEPATIC   (+) Gastroesophageal reflux disease without esophagitis      HEMATOLOGY   (+) Anemia      MUSCULOSKELETAL   (+) Primary osteoarthritis of right knee      NEURO/PSYCH   (+) Diabetic gastroparesis  (HCC)   (+) Generalized anxiety disorder   (+) Migraine without aura and without status migrainosus, not intractable   (+) Post-traumatic stress disorder, chronic      PULMONARY   (+) Pulmonary emphysema, unspecified emphysema type (HCC)        Physical Exam    Airway    Mallampati score: II  TM Distance: >3 FB  Neck ROM: full     Dental   No notable dental hx     Cardiovascular  Rhythm: regular, Rate: normal, Cardiovascular exam normal    Pulmonary  Pulmonary exam normal Breath sounds clear to auscultation    Other Findings  post-pubertal.      Anesthesia Plan  ASA Score- 3     Anesthesia Type- general with ASA Monitors.         Additional Monitors:     Airway Plan:     Comment: Adductor block  Pt has slurred speech in SDS.  This is her baseline  and is actually improved from her last pre op encounter.  Consented for GA + adductor block..       Plan Factors-    Chart reviewed.   Existing labs reviewed. Patient summary reviewed.                  Induction-     Postoperative Plan- Plan for postoperative opioid use.         Informed Consent- Anesthetic plan and risks discussed with patient.

## 2024-07-02 ENCOUNTER — EVALUATION (OUTPATIENT)
Dept: PHYSICAL THERAPY | Facility: REHABILITATION | Age: 59
End: 2024-07-02
Payer: COMMERCIAL

## 2024-07-02 DIAGNOSIS — M25.561 ACUTE PAIN OF RIGHT KNEE: Primary | ICD-10-CM

## 2024-07-02 DIAGNOSIS — Z96.651 STATUS POST RIGHT KNEE REPLACEMENT: ICD-10-CM

## 2024-07-02 NOTE — PROGRESS NOTES
Patient was scheduled for 5:45 PM appointment and arrived at 2:15 PM thinking her appointment was at 2:00 PM. PT, Kerri Seo unavailable at this time but PTA, Rosemarie Rodriguez spoke to patient and offered her a 3:00 PM appointment. Rosemarie explained to patient it is very important for her to be evaluated secondary to having a GARRICK yesterday. Patient refused this appointment time. She is scheduled for evaluation tomorrow 7/3.

## 2024-07-03 ENCOUNTER — EVALUATION (OUTPATIENT)
Dept: PHYSICAL THERAPY | Facility: REHABILITATION | Age: 59
End: 2024-07-03
Payer: COMMERCIAL

## 2024-07-03 DIAGNOSIS — M25.561 ACUTE PAIN OF RIGHT KNEE: Primary | ICD-10-CM

## 2024-07-03 DIAGNOSIS — F31.4 BIPOLAR DISORDER, CURRENT EPISODE DEPRESSED, SEVERE, WITHOUT PSYCHOTIC FEATURES (HCC): ICD-10-CM

## 2024-07-03 DIAGNOSIS — Z96.651 STATUS POST RIGHT KNEE REPLACEMENT: ICD-10-CM

## 2024-07-03 PROCEDURE — 97112 NEUROMUSCULAR REEDUCATION: CPT | Performed by: PHYSICAL THERAPIST

## 2024-07-03 PROCEDURE — 97110 THERAPEUTIC EXERCISES: CPT | Performed by: PHYSICAL THERAPIST

## 2024-07-03 PROCEDURE — 97140 MANUAL THERAPY 1/> REGIONS: CPT | Performed by: PHYSICAL THERAPIST

## 2024-07-03 PROCEDURE — 97164 PT RE-EVAL EST PLAN CARE: CPT | Performed by: PHYSICAL THERAPIST

## 2024-07-03 RX ORDER — CLONAZEPAM 0.5 MG/1
0.5 TABLET ORAL DAILY PRN
Qty: 30 TABLET | Refills: 0 | Status: SHIPPED | OUTPATIENT
Start: 2024-07-03

## 2024-07-03 NOTE — TELEPHONE ENCOUNTER
Reason for call: Patient stated pharmacy told her she does not  have any more refills!    [x] Refill   [] Prior Auth  [] Other:     Office:   [x] PCP/Provider -   [] Specialty/Provider -     Medication: clonazePAM (KlonoPIN) 0.5 mg tablet     Dose/Frequency: Take 1 tablet (0.5 mg total) by mouth daily as needed for seizures     Quantity: : 30 tablet     Ph[id number] [id number] [facility] [phone]     Does the patient have enough for 3 days?   [] Yes   [x] No - Send as HP to POD

## 2024-07-03 NOTE — PROGRESS NOTES
PT Re-Evaluation     Today's date: 7/3/2024  Patient name: Bhavana Smith  : 1965  MRN: [id number]  Referring provider: Rosemarie Bianchi PA-C  Dx:   Encounter Diagnosis     ICD-10-CM    1. Acute pain of right knee  M25.561       2. Status post right knee replacement  Z96.651                      Assessment  Impairments: abnormal coordination, abnormal gait, abnormal muscle firing, abnormal or restricted ROM, abnormal movement, activity intolerance, impaired balance, impaired physical strength, lacks appropriate home exercise program, pain with function and weight-bearing intolerance    Assessment details: Patient presents to PT with R knee pain s/p GARRICK on  and TKA on . Patient displays decreased knee ROM, knee strength and abnormal gait. These impairments are leading to pain and difficulty with walking, standing and stairs. Patient will benefit from skilled PT to address above impairments and help them return to PLOF.    Patient had a GARRICK on  and that is the reason for the re-evaluation today  Barriers to therapy: GARRICK    Goals  STG  1. Patient will display decreased pain to 0-2 in 6 weeks  2. Patient will display knee ROM WNL in 6 weeks  3. Patient will display knee strength WNL in 6 weeks    LTG  1. Patient will be able to stand for 30 minutes without pain in 12 weeks  2. Patient will be able to walk for 30 minutes without pain in 12 weeks  3. Patient will be able to go up/down 3 flights of stairs without pain in 12 weeks      Plan  Patient would benefit from: PT eval and skilled physical therapy  Referral necessary: Yes  Planned modality interventions: thermotherapy: hydrocollator packs, manual electrical stimulation, TENS, electrical stimulation/Russian stimulation and cryotherapy    Planned therapy interventions: activity modification, ADL training, balance, balance/weight bearing training, body mechanics training, coordination, flexibility, functional ROM exercises, gait training, graded  "activity, graded exercise, home exercise program, therapeutic training, therapeutic exercise, therapeutic activities, stretching, strengthening, neuromuscular re-education, motor coordination training, massage, manual therapy and joint mobilization    Frequency: 2x week  Duration in weeks: 6  Plan of Care beginning date: 7/3/2024  Plan of Care expiration date: 2024  Treatment plan discussed with: patient        Subjective Evaluation    History of Present Illness  Mechanism of injury: Patient had GARRICK on . She had a good amount of pain yesterday but today she is doing better. Patient is using cane for ambulation. Patient has stairs at home that she is having difficulty with          Not a recurrent problem   Quality of life: fair    Patient Goals  Patient goals for therapy: decreased edema, decreased pain, increased motion and increased strength    Pain  Current pain ratin  At best pain ratin  At worst pain ratin  Quality: sharp          Objective     Active Range of Motion     Right Knee   Flexion: 95 degrees with pain  Extension: 2 degrees with pain    Passive Range of Motion     Right Knee   Flexion: 105 degrees with pain  Extension: 0 degrees with pain    Strength/Myotome Testing     Right Knee   Flexion: 4  Extension: 4             Precautions: GARRICK 24      Manuals 7/3            PROM done                                                   Neuro Re-Ed             Quad sets 5\"x10                                                                                          Ther Ex             SLR 2x10            Heel slides 5\"x20            bike                                                                              Ther Activity                                       Gait Training                                       Modalities                                            "

## 2024-07-05 ENCOUNTER — OFFICE VISIT (OUTPATIENT)
Dept: PHYSICAL THERAPY | Facility: REHABILITATION | Age: 59
End: 2024-07-05
Payer: COMMERCIAL

## 2024-07-05 DIAGNOSIS — M17.11 PRIMARY OSTEOARTHRITIS OF RIGHT KNEE: ICD-10-CM

## 2024-07-05 DIAGNOSIS — Z96.651 STATUS POST RIGHT KNEE REPLACEMENT: ICD-10-CM

## 2024-07-05 DIAGNOSIS — M25.561 ACUTE PAIN OF RIGHT KNEE: Primary | ICD-10-CM

## 2024-07-05 PROCEDURE — 97110 THERAPEUTIC EXERCISES: CPT | Performed by: PHYSICAL THERAPIST

## 2024-07-05 PROCEDURE — 97530 THERAPEUTIC ACTIVITIES: CPT | Performed by: PHYSICAL THERAPIST

## 2024-07-05 PROCEDURE — 97140 MANUAL THERAPY 1/> REGIONS: CPT | Performed by: PHYSICAL THERAPIST

## 2024-07-05 NOTE — TELEPHONE ENCOUNTER
Patient called the RX Refill Line. Message is being forwarded to the office.     Patient is requesting a rush on this order as she is out of the medication. I explained I can send it as High priority but it could still take up to 72 hrs she verbalized understanding.     Please contact patient at  [phone]

## 2024-07-05 NOTE — PROGRESS NOTES
"Daily Note     Today's date: 2024  Patient name: Bhavana Smith  : 1965  MRN: [id number]  Referring provider: Rosemarie Bianchi PA-C  Dx:   Encounter Diagnosis     ICD-10-CM    1. Acute pain of right knee  M25.561       2. Status post right knee replacement  Z96.651                      Subjective: patient states her knee is a little more painful today      Objective: See treatment diary below      Assessment: Tolerated treatment well. Patient demonstrated fatigue post treatment, exhibited good technique with therapeutic exercises, and would benefit from continued PT Patient with improving knee flexion ROM but it continues to be limited. Quad strength is improving as well      Plan: Continue per plan of care.      Precautions: GARRICK 24      Manuals 7/3 7/5           PROM done done                                                  Neuro Re-Ed             Quad sets 5\"x10 5\"x10                                                                                         Ther Ex             SLR 2x10 2x10           Heel slides 5\"x20 5\"x20           bike  5 min                                                                            Ther Activity             Step ups  2x10                        Gait Training                                       Modalities                                            "

## 2024-07-08 ENCOUNTER — APPOINTMENT (OUTPATIENT)
Dept: PHYSICAL THERAPY | Facility: REHABILITATION | Age: 59
End: 2024-07-08
Payer: COMMERCIAL

## 2024-07-08 DIAGNOSIS — M24.661 ARTHROFIBROSIS OF KNEE JOINT, RIGHT: ICD-10-CM

## 2024-07-08 RX ORDER — OXYCODONE HYDROCHLORIDE 5 MG/1
5 TABLET ORAL EVERY 4 HOURS PRN
Qty: 42 TABLET | Refills: 0 | Status: SHIPPED | OUTPATIENT
Start: 2024-07-08 | End: 2024-07-15 | Stop reason: SDUPTHER

## 2024-07-08 NOTE — TELEPHONE ENCOUNTER
Patient called requesting refill for oxycodone . Patient made aware medication was refilled today 07. 08.2024 for [facility] pharmacy. Patient instructed to contact the pharmacy to obtain refills of medication. Patient verbalized understanding.

## 2024-07-10 ENCOUNTER — APPOINTMENT (OUTPATIENT)
Dept: PHYSICAL THERAPY | Facility: REHABILITATION | Age: 59
End: 2024-07-10
Payer: COMMERCIAL

## 2024-07-10 DIAGNOSIS — F43.10 PTSD (POST-TRAUMATIC STRESS DISORDER): ICD-10-CM

## 2024-07-10 RX ORDER — TRAZODONE HYDROCHLORIDE 100 MG/1
200 TABLET ORAL
Qty: 60 TABLET | Refills: 5 | Status: SHIPPED | OUTPATIENT
Start: 2024-07-10

## 2024-07-15 DIAGNOSIS — M17.11 PRIMARY OSTEOARTHRITIS OF RIGHT KNEE: ICD-10-CM

## 2024-07-15 RX ORDER — OXYCODONE HYDROCHLORIDE 5 MG/1
5 TABLET ORAL EVERY 4 HOURS PRN
Qty: 42 TABLET | Refills: 0 | Status: SHIPPED | OUTPATIENT
Start: 2024-07-15 | End: 2024-07-24 | Stop reason: SDUPTHER

## 2024-07-15 NOTE — TELEPHONE ENCOUNTER
Reason for call:   [x] Refill   [] Prior Auth  [] Other:     Office:   [] PCP/Provider -   [x] Specialty/Provider - Dr Carlton LAWRENCE    Medication:   Oxycodone 5 mg, 1 q4 prn, 42    Pharmacy:   [facility]    Does the patient have enough for 3 days?   [] Yes   [x] No - Send as HP to POD

## 2024-07-22 DIAGNOSIS — G43.009 MIGRAINE WITHOUT AURA AND WITHOUT STATUS MIGRAINOSUS, NOT INTRACTABLE: ICD-10-CM

## 2024-07-22 RX ORDER — SUMATRIPTAN 25 MG/1
TABLET, FILM COATED ORAL
Qty: 10 TABLET | Refills: 1 | Status: SHIPPED | OUTPATIENT
Start: 2024-07-22

## 2024-07-24 ENCOUNTER — TELEPHONE (OUTPATIENT)
Dept: PAIN MEDICINE | Facility: MEDICAL CENTER | Age: 59
End: 2024-07-24

## 2024-07-24 DIAGNOSIS — M17.11 PRIMARY OSTEOARTHRITIS OF RIGHT KNEE: ICD-10-CM

## 2024-07-24 RX ORDER — OXYCODONE HYDROCHLORIDE 5 MG/1
5 TABLET ORAL EVERY 6 HOURS PRN
Qty: 42 TABLET | Refills: 0 | Status: SHIPPED | OUTPATIENT
Start: 2024-07-24 | End: 2024-07-25

## 2024-07-24 NOTE — TELEPHONE ENCOUNTER
Oxycodone refilled today, q 6 hours.  Patient will need to come to her follow up appointment to get any more refills.  Please make patient aware.

## 2024-07-25 ENCOUNTER — TELEPHONE (OUTPATIENT)
Dept: OBGYN CLINIC | Facility: MEDICAL CENTER | Age: 59
End: 2024-07-25

## 2024-07-25 DIAGNOSIS — E78.2 MIXED HYPERLIPIDEMIA: ICD-10-CM

## 2024-07-25 RX ORDER — OXYCODONE HYDROCHLORIDE 5 MG/1
5 TABLET ORAL EVERY 6 HOURS PRN
Qty: 42 TABLET | Refills: 0 | Status: SHIPPED | OUTPATIENT
Start: 2024-07-25

## 2024-07-25 RX ORDER — ATORVASTATIN CALCIUM 20 MG/1
20 TABLET, FILM COATED ORAL DAILY
Qty: 90 TABLET | Refills: 1 | Status: SHIPPED | OUTPATIENT
Start: 2024-07-25

## 2024-07-25 NOTE — TELEPHONE ENCOUNTER
Patient called regarding her medication refill,   oxyCODONE (Roxicodone)   Medication was sent to the wrong pharmacy, patient is currently at the [address] at [facility].  Patient is requesting the medication be resent to the [facility]. Patient is there now. I did advise the call center that I would have to sent the team a message and not sure how long this process will take to be address. Patient will be contacted by the pharmacy or staff member when medication has been transferred over.     Thank you.     Please assist and advise.

## 2024-07-25 NOTE — TELEPHONE ENCOUNTER
Caller: Patient     Doctor: Carlton    Reason for call: Patient states that the Oxycodone refill was sent to [facility] but she asked for it to be sent to [facility], [address] [place] [address], [place] [address], Ph#[phone]-on file in chart    Call back#: [phone]

## 2024-07-25 NOTE — TELEPHONE ENCOUNTER
Caller: patient    Doctor: Carlton    Reason for call: pt called stating the Oxycodone script was sent to the wrong pharmacy. She is asking if it can be sent to [facility], on file in chart. The pharmacy that was sent to is a hour away from the patient    Call back#: [phone]

## 2024-07-31 ENCOUNTER — TELEPHONE (OUTPATIENT)
Dept: OBGYN CLINIC | Facility: CLINIC | Age: 59
End: 2024-07-31

## 2024-08-06 ENCOUNTER — TELEPHONE (OUTPATIENT)
Age: 59
End: 2024-08-06

## 2024-08-06 DIAGNOSIS — M17.11 PRIMARY OSTEOARTHRITIS OF RIGHT KNEE: ICD-10-CM

## 2024-08-06 DIAGNOSIS — F31.4 BIPOLAR DISORDER, CURRENT EPISODE DEPRESSED, SEVERE, WITHOUT PSYCHOTIC FEATURES (HCC): ICD-10-CM

## 2024-08-06 RX ORDER — OXYCODONE HYDROCHLORIDE 5 MG/1
5 TABLET ORAL EVERY 6 HOURS PRN
Qty: 42 TABLET | Refills: 0 | Status: SHIPPED | OUTPATIENT
Start: 2024-08-06 | End: 2024-08-07

## 2024-08-06 RX ORDER — CLONAZEPAM 0.5 MG/1
0.5 TABLET ORAL DAILY PRN
Qty: 30 TABLET | Refills: 0 | Status: SHIPPED | OUTPATIENT
Start: 2024-08-06

## 2024-08-06 NOTE — TELEPHONE ENCOUNTER
Pt contacted Call Center requested refill of their medication.        Doctor Name: Carlton      Medication Name: Oxycodone       Dosage of Med: 5mg      Frequency of Med: every 4hrs      Remaining Medication: 3      Pharmacy and Location: [facility]        Pt. Preferred Callback Phone Number: [phone] patient states she has a follow up appt 8/13      Thank you.       PLEASE ADVISE PATIENTS:    REFILL REQUESTS WILL BE PROCESSED WITHIN 24-48 HOURS.

## 2024-08-07 RX ORDER — OXYCODONE HYDROCHLORIDE 5 MG/1
5 TABLET ORAL EVERY 6 HOURS PRN
Qty: 42 TABLET | Refills: 0 | Status: SHIPPED | OUTPATIENT
Start: 2024-08-07 | End: 2024-08-16 | Stop reason: SDUPTHER

## 2024-08-07 NOTE — TELEPHONE ENCOUNTER
Caller: Patient    Doctor: Dr. Vincent    Reason for call: Patient calling stating that the Oxycodone was suppose to go to the pharmacy below.  It was sent to [facility].      [facility]/pharmacy #[id number] - [place] - [address] [phone]       Call back#: [phone]

## 2024-08-13 ENCOUNTER — OFFICE VISIT (OUTPATIENT)
Dept: OBGYN CLINIC | Facility: MEDICAL CENTER | Age: 59
End: 2024-08-13

## 2024-08-13 VITALS
DIASTOLIC BLOOD PRESSURE: 89 MMHG | BODY MASS INDEX: 29.59 KG/M2 | WEIGHT: 167 LBS | HEIGHT: 63 IN | SYSTOLIC BLOOD PRESSURE: 162 MMHG | HEART RATE: 72 BPM

## 2024-08-13 DIAGNOSIS — Z98.890 STATUS POST SURGICAL MANIPULATION OF KNEE JOINT: ICD-10-CM

## 2024-08-13 DIAGNOSIS — Z96.651 STATUS POST TOTAL KNEE REPLACEMENT, RIGHT: Primary | ICD-10-CM

## 2024-08-13 PROCEDURE — 99024 POSTOP FOLLOW-UP VISIT: CPT | Performed by: STUDENT IN AN ORGANIZED HEALTH CARE EDUCATION/TRAINING PROGRAM

## 2024-08-13 RX ORDER — OXYCODONE HYDROCHLORIDE 5 MG/1
5 TABLET ORAL EVERY 6 HOURS PRN
Qty: 42 TABLET | Refills: 0 | Status: CANCELLED | OUTPATIENT
Start: 2024-08-13

## 2024-08-13 NOTE — PROGRESS NOTES
Subjective:    Bhavana is a pleasant 58-year-old female presenting 16 weeks status post right total knee arthroplasty and 6 weeks status post manipulation under anesthesia DOS 7/1/2024. She states that she is experiencing improving pain to the anterior aspect of her knee as well as at the lateral aspect.  She notes that positional positions from sitting to standing will exacerbate her pain anteriorly and does have pain in the knee with ambulation.  She does utilize a cane for ambulation.  She notes that she had to recently discontinue PT due to moving further away. She does utilize oxycodone for pain and notes that this has failed to provide her with lasting relief.     Physical Exam:  Incision: Clean, dry and intact well-healed  Stable to valgus and varus stress at 0, 30 and 90  Hip flexion: 5/5  Extensor mechanism intact  ROM: 0-110  5/5 IP/Q/HS/TA/GS, 2+ DP/PT, SILT DP/SP/S/S/TN    XR left knee: no new imaging today    Assessment/Plan:  16 weeks s/p right total knee arthroplasty DOS 4/1/2024  6 weeks s/p right knee manipulation under anesthesia DOS 7/1/2024    Bhavana upon examination and review of her x-rays does demonstrate improvement in ROM, swelling, and pain. I am pleased with her progression at this time. I recommend continued Physical Therapy at this time. Continue use of cane for ambulatory assistance as needed. Continue current pain medication regiment as needed. Follow up in 6 weeks for re-evaluation and ROM check.           Scribe Attestation      I,:  Kerri Brito am acting as a scribe while in the presence of the attending physician.:       I,:  Rosas Vincent DO personally performed the services described in this documentation    as scribed in my presence.:

## 2024-08-16 DIAGNOSIS — M17.11 PRIMARY OSTEOARTHRITIS OF RIGHT KNEE: ICD-10-CM

## 2024-08-16 RX ORDER — OXYCODONE HYDROCHLORIDE 5 MG/1
5 TABLET ORAL EVERY 6 HOURS PRN
Qty: 42 TABLET | Refills: 0 | Status: SHIPPED | OUTPATIENT
Start: 2024-08-16 | End: 2024-08-23 | Stop reason: SDUPTHER

## 2024-08-21 ENCOUNTER — TELEPHONE (OUTPATIENT)
Age: 59
End: 2024-08-21

## 2024-08-23 DIAGNOSIS — M17.11 PRIMARY OSTEOARTHRITIS OF RIGHT KNEE: ICD-10-CM

## 2024-08-23 DIAGNOSIS — Z96.651 STATUS POST TOTAL KNEE REPLACEMENT, RIGHT: ICD-10-CM

## 2024-08-23 RX ORDER — LIDOCAINE 50 MG/G
1 PATCH TOPICAL DAILY
Qty: 9 PATCH | Refills: 0 | Status: SHIPPED | OUTPATIENT
Start: 2024-08-23

## 2024-08-23 RX ORDER — OXYCODONE HYDROCHLORIDE 5 MG/1
5 TABLET ORAL EVERY 6 HOURS PRN
Qty: 42 TABLET | Refills: 0 | Status: SHIPPED | OUTPATIENT
Start: 2024-08-23 | End: 2024-08-30 | Stop reason: SDUPTHER

## 2024-08-23 NOTE — TELEPHONE ENCOUNTER
Reason for call:   [x] Refill   [] Prior Auth  [] Other:     Office:   [] PCP/Provider -   [x] Specialty/Provider - [facility] Bianchi PA-C     Medication: oxyCODONE (Roxicodone) 5 immediate release tablet     Dose/Frequency: Take 1 tablet (5 mg total) by mouth every 6 (six) hours as needed for moderate pain or severe pain     Quantity: 42 tablet       Medication: lidocaine (Lidoderm) 5 %     Dose/Frequency: Apply 1 patch topically over 12 hours daily Remove & Discard patch within 12 hours or as directed by MD     Quantity: 9 patches      Pharmacy: [facility]/pharmacy #[id number] - [place] - [address] [phone]    Does the patient have enough for 3 days?   [] Yes   [x] No - Send as HP to POD

## 2024-08-27 DIAGNOSIS — F31.9 BIPOLAR 1 DISORDER, DEPRESSED (HCC): ICD-10-CM

## 2024-08-27 DIAGNOSIS — I10 PRIMARY HYPERTENSION: ICD-10-CM

## 2024-08-28 RX ORDER — GABAPENTIN 800 MG/1
TABLET ORAL
Qty: 270 TABLET | Refills: 3 | Status: SHIPPED | OUTPATIENT
Start: 2024-08-28

## 2024-08-28 RX ORDER — METOPROLOL TARTRATE 50 MG
50 TABLET ORAL EVERY 12 HOURS
Qty: 180 TABLET | Refills: 3 | Status: SHIPPED | OUTPATIENT
Start: 2024-08-28

## 2024-08-30 ENCOUNTER — TELEPHONE (OUTPATIENT)
Dept: OTHER | Facility: HOSPITAL | Age: 59
End: 2024-08-30

## 2024-08-30 DIAGNOSIS — M17.11 PRIMARY OSTEOARTHRITIS OF RIGHT KNEE: ICD-10-CM

## 2024-08-30 RX ORDER — OXYCODONE HYDROCHLORIDE 5 MG/1
5 TABLET ORAL EVERY 6 HOURS PRN
Qty: 42 TABLET | Refills: 0 | Status: SHIPPED | OUTPATIENT
Start: 2024-08-30

## 2024-08-30 NOTE — TELEPHONE ENCOUNTER
Reason for call:   [x] Refill   [] Prior Auth  [] Other:     Office:   [] PCP/Provider -   [x] Specialty/Provider -  Rosemarie Bianchi PA-C / Ortho    Medication: oxyCODONE (Roxicodone) 5 immediate release tablet     Dose/Frequency:  Take 1 tablet (5 mg total) by mouth every 6 (six) hours as needed for moderate pain or severe pain Max Daily Amount: 20 mg     Quantity: 42    Pharmacy: [facility]/pharmacy #[id number] - [place] - [address] [phone]     Does the patient have enough for 3 days?   [] Yes   [x] No - Send as HP to POD

## 2024-08-30 NOTE — TELEPHONE ENCOUNTER
Patient called the RX Refill Line. Message is being forwarded to the office.           Patient is requesting to know if she needs to be seen sooner for her follow up appt    Patients knee has given out on her twice since surgery.  She stated she was going to the bathroom, and she fell and it felt like she strained a muscle.      The second time she was at the grocery store and as it gave out she fell forward into the freezer doors           Please contact patient directly at [phone]

## 2024-09-06 ENCOUNTER — TELEPHONE (OUTPATIENT)
Age: 59
End: 2024-09-06

## 2024-09-06 DIAGNOSIS — F31.4 BIPOLAR DISORDER, CURRENT EPISODE DEPRESSED, SEVERE, WITHOUT PSYCHOTIC FEATURES (HCC): ICD-10-CM

## 2024-09-06 NOTE — TELEPHONE ENCOUNTER
Reason for call:   [x] Refill   [] Prior Auth  [] Other:     Office:   [x] PCP/Provider -   [] Specialty/Provider -         Does the patient have enough for 3 days?   [] Yes   [x] No - Send as HP to POD     Never

## 2024-09-09 DIAGNOSIS — M17.11 PRIMARY OSTEOARTHRITIS OF RIGHT KNEE: ICD-10-CM

## 2024-09-09 RX ORDER — CLONAZEPAM 0.5 MG/1
0.5 TABLET ORAL DAILY PRN
Qty: 30 TABLET | Refills: 0 | Status: SHIPPED | OUTPATIENT
Start: 2024-09-09

## 2024-09-09 RX ORDER — OXYCODONE HYDROCHLORIDE 5 MG/1
5 TABLET ORAL EVERY 6 HOURS PRN
Qty: 42 TABLET | Refills: 0 | Status: SHIPPED | OUTPATIENT
Start: 2024-09-09 | End: 2024-09-17 | Stop reason: SDUPTHER

## 2024-09-09 NOTE — TELEPHONE ENCOUNTER
Reason for call:   [x] Refill   [] Prior Auth  [] Other:     Office:   [] PCP/Provider -   [x] Specialty/Provider - Ortho    Medication: oxyCODONE (Roxicodone) 5 immediate release tablet Take 1 tablet (5 mg total) by mouth every 6 (six) hours as needed for moderate pain or severe pain       Pharmacy: [facility]/pharmacy #[id number] - [place], [place] - [address]      Does the patient have enough for 3 days?   [] Yes   [x] No - Send as HP to POD

## 2024-09-09 NOTE — TELEPHONE ENCOUNTER
I sent 30 day supply. Please advise patient that she will have to find a new provider in her new area of residence or she will needs to come to this office for med check

## 2024-09-17 DIAGNOSIS — M17.11 PRIMARY OSTEOARTHRITIS OF RIGHT KNEE: ICD-10-CM

## 2024-09-17 RX ORDER — OXYCODONE HYDROCHLORIDE 5 MG/1
5 TABLET ORAL EVERY 6 HOURS PRN
Qty: 42 TABLET | Refills: 0 | Status: SHIPPED | OUTPATIENT
Start: 2024-09-17 | End: 2024-09-24 | Stop reason: SDUPTHER

## 2024-09-17 NOTE — TELEPHONE ENCOUNTER
Medication:  PDMP 09/09/2024 09/09/2024 oxyCODONE HCL (Tablet) 42.0 10 5 MG 31.50 [facility] Private Pay 0 / 0 [place]   1 [id number] 09/02/2024 08/30/2024 oxyCODONE HCL (Tablet) 42.0 10 5 MG 31.50 [facility] Medicare 0 / 0 [place]   1 [id number] 08/23/2024 08/23/2024 oxyCODONE HCL (Tablet) 42.0 10 5 MG 31.50 [facility]  Active agreement on file -

## 2024-09-17 NOTE — TELEPHONE ENCOUNTER
Patient called to request a refill for their oxcodone advised a refill was requested on 9/17/2024 and is pending approval. Patient verbalized understanding and is in agreement.     Patient wants a call back once this is sent to the pharmacy

## 2024-09-17 NOTE — TELEPHONE ENCOUNTER
Reason for call:   [x] Refill   [] Prior Auth  [] Other:     Office:   [] PCP/Provider -   [x] Specialty/Provider - [facility]     Medication: oxyCODONE (Roxicodone) 5 immediate release 1 tablet every 6 hrs as needed     Pharmacy: [facility]     Does the patient have enough for 3 days?   [] Yes   [x] No - Send as HP to POD

## 2024-09-18 ENCOUNTER — TELEPHONE (OUTPATIENT)
Dept: FAMILY MEDICINE CLINIC | Facility: CLINIC | Age: 59
End: 2024-09-18

## 2024-09-24 ENCOUNTER — OFFICE VISIT (OUTPATIENT)
Age: 59
End: 2024-09-24

## 2024-09-24 VITALS
SYSTOLIC BLOOD PRESSURE: 134 MMHG | WEIGHT: 164 LBS | HEART RATE: 74 BPM | BODY MASS INDEX: 29.06 KG/M2 | DIASTOLIC BLOOD PRESSURE: 80 MMHG | HEIGHT: 63 IN

## 2024-09-24 DIAGNOSIS — Z98.890 STATUS POST SURGICAL MANIPULATION OF KNEE JOINT: ICD-10-CM

## 2024-09-24 DIAGNOSIS — M17.11 PRIMARY OSTEOARTHRITIS OF RIGHT KNEE: ICD-10-CM

## 2024-09-24 DIAGNOSIS — Z96.651 STATUS POST TOTAL KNEE REPLACEMENT, RIGHT: Primary | ICD-10-CM

## 2024-09-24 PROCEDURE — 99024 POSTOP FOLLOW-UP VISIT: CPT | Performed by: STUDENT IN AN ORGANIZED HEALTH CARE EDUCATION/TRAINING PROGRAM

## 2024-09-24 RX ORDER — OXYCODONE HYDROCHLORIDE 5 MG/1
5 TABLET ORAL EVERY 6 HOURS PRN
Qty: 42 TABLET | Refills: 0 | Status: SHIPPED | OUTPATIENT
Start: 2024-09-24

## 2024-09-24 NOTE — PROGRESS NOTES
"Knee New Office Note    Assessment:     1. Status post total knee replacement, right    2. Status post surgical manipulation of knee joint    3. Primary osteoarthritis of right knee        Plan:     Problem List Items Addressed This Visit          Musculoskeletal and Integument    Primary osteoarthritis of right knee     Other Visit Diagnoses       Status post total knee replacement, right    -  Primary    Relevant Orders    Ambulatory referral to Spine & Pain Management    Status post surgical manipulation of knee joint        Relevant Orders    Ambulatory referral to Spine & Pain Management           Findings today are consistent with 3 months status post right knee GARRICK performed on 07/01/2024. She is roughly 6 months status post right total knee arthroplasty performed on 04/01/2024. Her knee is stable on exam and she has appropriate range of motion. She was encouraged that she may continue to see relief over 1 year post-operatively. She was encouraged to continue physical therapy exercises and strengthening activities. Final refill of oxycodone provided as she is 6 months from her initial surgery and 3 months from her manipulation. Referral to Dr. Desouza with pain management provided for further evaluation and treatment of her concomitant lumbar radiculopathy. Follow up in 3 months for re-evaluation.    Subjective:     Patient ID: Bhavana Smith is a 58 y.o. female.  Chief Complaint:  HPI:  58 y.o. female presents to the office 3 months status post right knee GARRICK performed on 07/01/2024. She is roughly 6 months status post right total knee arthroplasty performed on 04/01/2024. She continues to experience a sharp shooting pain to the lateral aspect of the right knee. She also experiences burning pains. Her pain worsens with activities and at rest following increased activity. She has had 2 falls due to pain and instability. She also notes a \"nervous tick\" due to her shooting pain. She is accompanied by her son today " in the office.    Allergy:  Allergies   Allergen Reactions    Latex Itching and Rash    Losartan Cough     Medications:  all current active meds have been reviewed  Past Medical History:  Past Medical History:   Diagnosis Date    Addiction to drug (HCC)     Adjustment disorder     Alcohol abuse     Alcoholism (HCC)     Anxiety     Arthritis     Bipolar disorder (HCC)     Bowel obstruction (HCC)     Colon polyp     Depression     Diabetes mellitus (HCC)     Diabetes type 2, controlled (HCC)     Disease of thyroid gland     Gastritis     Head injury     Heart palpitations     Hyperlipidemia     Hypertension     Hypothyroidism     Psychiatric illness     PTSD (post-traumatic stress disorder)     Seizure (HCC)     Seizures (HCC)     Sleep difficulties     Substance abuse (HCC)     Suicide attempt (HCC)     Withdrawal symptoms, drug or narcotic (HCC)      Past Surgical History:  Past Surgical History:   Procedure Laterality Date    ABDOMINAL SURGERY      APPENDECTOMY      BOWEL RESECTION      CHOLECYSTECTOMY      COLONOSCOPY      ESOPHAGOGASTRODUODENOSCOPY N/A 05/18/2018    Procedure: ESOPHAGOGASTRODUODENOSCOPY (EGD) with bx;  Surgeon: Lulu West DO;  Location: [facility];  Service: Gastroenterology    HYSTERECTOMY      KNEE SURGERY Bilateral 1991    WI ARTHRP KNE CONDYLE&PLATU MEDIAL&LAT COMPARTMENTS Right 04/01/2024    Procedure: ARTHROPLASTY KNEE TOTAL- possible same day;  Surgeon: Rosas Vincent DO;  Location: [facility];  Service: Orthopedics    WI MANIPULATION KNEE JOINT UNDER GENERAL ANESTHESIA Right 7/1/2024    Procedure: MANIPULATION JOINT KNEE, adductor canal block;  Surgeon: Rosas Vincent DO;  Location: [facility];  Service: Orthopedics     Family History:  Family History   Problem Relation Age of Onset    Bipolar disorder Mother     Cancer Father     Diabetes Father      Social History:  Social History     Substance and Sexual Activity   Alcohol Use Not Currently    Alcohol/week: 6.0  "standard drinks of alcohol    Types: 6 Cans of beer per week    Comment: last drink on 08/15/20     Social History     Substance and Sexual Activity   Drug Use Never     Social History     Tobacco Use   Smoking Status Every Day    Current packs/day: 1.00    Average packs/day: 1 pack/day for 25.0 years (25.0 ttl pk-yrs)    Types: Cigarettes    Passive exposure: Current   Smokeless Tobacco Never   Tobacco Comments    smokes like 5 a day(06/24/2022), smokes 3 cigarettes per day (3/19/24) last smoked 6.30.24          ROS:  General: Per HPI  Skin: Negative, except if noted below  HEENT: Negative  Respiratory: Negative  Cardiovascular: Negative  Gastrointestinal: Negative  Urinary: Negative  Vascular: Negative  Musculoskeletal: Positive per HPI   Neurologic: Positive per HPI  Endocrine: Negative    Objective:  BP Readings from Last 1 Encounters:   09/24/24 134/80      Wt Readings from Last 1 Encounters:   09/24/24 74.4 kg (164 lb)        Respiratory:   non-labored respirations    Lymphatics:  no palpable lymph nodes    Gait:   Ambulates with the assistance of a cane    Neurologic:   Alert and oriented times 3  Patient with normal sensation except as noted below  Deep tendon reflexes 2+ except as noted in MSK exam    Bilateral Lower Extremity:    Right knee:     Inspection:  well healed incision    Overall limb alignment neutral    Effusion: none    ROM 0-115 with pain deep flexion    Extensor Lag: none    Palpation: no Joint line tenderness to palpation    AP Stability at 90 deg stable    M/L stability in full extension stable    M/L stability in midflexion stable    Motor: 5/5 Q/HS/TA/GS/P    Pulses: 2+ DP / 2+ PT    SILT DP/SP/S/S/TN    Imaging:  No new imaging obtained today    BMI:   Estimated body mass index is 29.05 kg/m² as calculated from the following:    Height as of this encounter: 5' 3\" (1.6 m).    Weight as of this encounter: 74.4 kg (164 lb).  BSA:   Estimated body surface area is 1.78 meters squared as " "calculated from the following:    Height as of this encounter: 5' 3\" (1.6 m).    Weight as of this encounter: 74.4 kg (164 lb).           Scribe Attestation      I,:  Bettye Cintron am acting as a scribe while in the presence of the attending physician.:       I,:  Rosas Vincent, DO personally performed the services described in this documentation    as scribed in my presence.:              "

## 2024-09-25 DIAGNOSIS — I10 PRIMARY HYPERTENSION: ICD-10-CM

## 2024-09-25 DIAGNOSIS — F31.4 BIPOLAR DISORDER, CURRENT EPISODE DEPRESSED, SEVERE, WITHOUT PSYCHOTIC FEATURES (HCC): ICD-10-CM

## 2024-09-25 RX ORDER — HYDROCHLOROTHIAZIDE 12.5 MG/1
12.5 TABLET ORAL DAILY
Qty: 100 TABLET | Refills: 1 | Status: SHIPPED | OUTPATIENT
Start: 2024-09-25

## 2024-09-26 ENCOUNTER — TELEPHONE (OUTPATIENT)
Dept: OTHER | Facility: OTHER | Age: 59
End: 2024-09-26

## 2024-09-26 NOTE — TELEPHONE ENCOUNTER
Called back in for update.  med will be refaxing documentation request. Please confirm receipt of forms.

## 2024-09-26 NOTE — TELEPHONE ENCOUNTER
Taryn from [facility], [phone] called in regards of the request for glucose monitoring prescription. Triage RN attempted to connect a caller with the clerical pool. The call was disconnected, the clerical representative was made aware of it.

## 2024-09-27 NOTE — TELEPHONE ENCOUNTER
09/27/24 3:17 AM        The office's request has been received, reviewed, and the patient chart updated. The PCP has successfully been removed with a patient attribution note. This message will now be completed.        Thank you  Armando Smalls

## 2024-09-27 NOTE — TELEPHONE ENCOUNTER
Patient has moved to [place]. She is establishing with new pcp. Additionally paperwork is for continuous glucose monitoring which she does not qualify for since she is not on insulin

## 2024-10-07 ENCOUNTER — TELEPHONE (OUTPATIENT)
Age: 59
End: 2024-10-07

## 2024-10-07 NOTE — TELEPHONE ENCOUNTER
Received phone call from Mae from [employer] and she was calling to find out why PCP has not addressed any state of [place] paperwork from August. I informed that patient moved and has not been a patient of this office for about a month if not more. After looking in chart all paperwork was in Media and was completed in full and faxed in August. Requested to email completed form ASAP to [email or web address]. Paperwork was emailed as requested.

## 2024-10-07 NOTE — TELEPHONE ENCOUNTER
Patient calling because she need some information faxed and she wanted to speak to the office. Please call back. Patient stated that she just had a stroke. I could not understand much.  Aundrea Coleman

## 2024-10-07 NOTE — TELEPHONE ENCOUNTER
Last time I saw Arleen was June 2024.  She informed me that she was moving to [place] permanently.  I did complete paperwork in August that she had failed to find a new PCP.  September 9, she contacted me for a refill.  I advised that she needed to find a new PCP or come to office for med check.  She alerted that she had appointment with new PCP.  With that said she is under the care of another provider per her message.

## 2024-10-09 ENCOUNTER — TELEPHONE (OUTPATIENT)
Dept: OBGYN CLINIC | Facility: HOSPITAL | Age: 59
End: 2024-10-09

## 2024-10-09 NOTE — TELEPHONE ENCOUNTER
Caller: Bhavana     Doctor: Carlton / Jasmine    Reason for call: Patient was seen with Dr. Vincent for R knee surgery.     Patient stated she had problems with falling and was seen at the [facility] ED on Monday , 9/30.      Patient stated she had left sided stroke which would explain the falling and wanted you to know.  The doctors told her the stroke happened  about a month prior.      Patient complains of on going R knee and arm pain .  8 out 10 on pain scale.   She wakes from the pain at night .    She wanted to be sure Dr Vincent was made aware of this .    [facility] records brought over to her chart.     Call back#: [phone]

## 2024-10-10 ENCOUNTER — TELEPHONE (OUTPATIENT)
Age: 59
End: 2024-10-10

## 2024-10-23 ENCOUNTER — VBI (OUTPATIENT)
Dept: ADMINISTRATIVE | Facility: OTHER | Age: 59
End: 2024-10-23

## 2024-10-23 NOTE — TELEPHONE ENCOUNTER
10/23/24 7:06 AM     Chart reviewed for Mammogram ; nothing is submitted to the patient's insurance at this time.     Chantale Holland MA   [facility]

## 2024-12-06 DIAGNOSIS — E55.9 VITAMIN D DEFICIENCY: ICD-10-CM

## 2024-12-06 RX ORDER — ERGOCALCIFEROL 1.25 MG/1
50000 CAPSULE, LIQUID FILLED ORAL WEEKLY
Qty: 15 CAPSULE | Refills: 2 | Status: SHIPPED | OUTPATIENT
Start: 2024-12-06

## 2024-12-10 DIAGNOSIS — F31.4 BIPOLAR DISORDER, CURRENT EPISODE DEPRESSED, SEVERE, WITHOUT PSYCHOTIC FEATURES (HCC): ICD-10-CM

## 2025-01-07 DIAGNOSIS — F43.10 PTSD (POST-TRAUMATIC STRESS DISORDER): ICD-10-CM

## 2025-01-08 RX ORDER — TRAZODONE HYDROCHLORIDE 100 MG/1
200 TABLET ORAL
Qty: 60 TABLET | Refills: 5 | Status: SHIPPED | OUTPATIENT
Start: 2025-01-08

## 2025-01-29 DIAGNOSIS — E03.9 HYPOTHYROIDISM: ICD-10-CM

## 2025-01-29 RX ORDER — LEVOTHYROXINE SODIUM 25 UG/1
25 TABLET ORAL
Qty: 100 TABLET | Refills: 2 | OUTPATIENT
Start: 2025-01-29

## 2025-01-30 DIAGNOSIS — E11.43 DIABETIC GASTROPARESIS  (HCC): ICD-10-CM

## 2025-01-30 DIAGNOSIS — K31.84 DIABETIC GASTROPARESIS  (HCC): ICD-10-CM

## 2025-01-31 RX ORDER — PANTOPRAZOLE SODIUM 40 MG/1
40 TABLET, DELAYED RELEASE ORAL 2 TIMES DAILY
Qty: 180 TABLET | Refills: 1 | Status: SHIPPED | OUTPATIENT
Start: 2025-01-31

## 2025-02-15 DIAGNOSIS — E03.9 HYPOTHYROIDISM: ICD-10-CM

## 2025-02-17 RX ORDER — LEVOTHYROXINE SODIUM 25 UG/1
25 TABLET ORAL
Qty: 100 TABLET | Refills: 2 | OUTPATIENT
Start: 2025-02-17

## 2025-02-28 DIAGNOSIS — I10 PRIMARY HYPERTENSION: ICD-10-CM

## 2025-02-28 RX ORDER — HYDROCHLOROTHIAZIDE 12.5 MG/1
12.5 TABLET ORAL DAILY
Qty: 100 TABLET | Refills: 1 | Status: SHIPPED | OUTPATIENT
Start: 2025-02-28

## 2025-03-14 NOTE — TELEPHONE ENCOUNTER
Pt called stating that a friend of hers received mail from us that was important. I found nothing in the pt's chart that was sent from [facility]. Informed pt that she has a colon due in 2028. Pt will check with her friend and get the mail.

## 2025-03-28 DIAGNOSIS — E03.9 HYPOTHYROIDISM: ICD-10-CM

## 2025-03-31 RX ORDER — LEVOTHYROXINE SODIUM 25 UG/1
25 TABLET ORAL
Qty: 60 TABLET | Refills: 5 | OUTPATIENT
Start: 2025-03-31

## 2025-04-02 ENCOUNTER — TELEPHONE (OUTPATIENT)
Dept: GASTROENTEROLOGY | Facility: CLINIC | Age: 60
End: 2025-04-02

## 2025-04-02 NOTE — TELEPHONE ENCOUNTER
(Dr Forde pt) EGD w/ possible botox - diabetic gastroparesis / gastric polyp. I lmom for pt to please call back to schedule with one of our GI providers as Dr Forde is no longer with the network.  Recall letter mailed.

## 2025-05-05 DIAGNOSIS — F31.9 BIPOLAR 1 DISORDER, DEPRESSED (HCC): ICD-10-CM

## 2025-05-06 RX ORDER — GABAPENTIN 800 MG/1
800 TABLET ORAL 3 TIMES DAILY
Qty: 300 TABLET | Refills: 2 | OUTPATIENT
Start: 2025-05-06

## 2025-06-08 DIAGNOSIS — M17.11 PRIMARY OSTEOARTHRITIS OF RIGHT KNEE: ICD-10-CM

## 2025-06-09 RX ORDER — MULTIVITAMIN WITH FOLIC ACID 400 MCG
1 TABLET ORAL DAILY
Qty: 30 TABLET | Refills: 1 | OUTPATIENT
Start: 2025-06-09

## 2025-06-13 DIAGNOSIS — K31.84 DIABETIC GASTROPARESIS  (HCC): ICD-10-CM

## 2025-06-13 DIAGNOSIS — F43.10 PTSD (POST-TRAUMATIC STRESS DISORDER): ICD-10-CM

## 2025-06-13 DIAGNOSIS — E11.43 DIABETIC GASTROPARESIS  (HCC): ICD-10-CM

## 2025-06-13 DIAGNOSIS — F41.9 ANXIETY: ICD-10-CM

## 2025-06-15 RX ORDER — ESCITALOPRAM OXALATE 10 MG/1
10 TABLET ORAL DAILY
Qty: 30 TABLET | Refills: 0 | Status: SHIPPED | OUTPATIENT
Start: 2025-06-15

## 2025-06-15 RX ORDER — TRAZODONE HYDROCHLORIDE 100 MG/1
200 TABLET ORAL
Qty: 60 TABLET | Refills: 0 | Status: SHIPPED | OUTPATIENT
Start: 2025-06-15

## 2025-06-16 RX ORDER — PANTOPRAZOLE SODIUM 40 MG/1
40 TABLET, DELAYED RELEASE ORAL 2 TIMES DAILY
Qty: 180 TABLET | Refills: 0 | Status: SHIPPED | OUTPATIENT
Start: 2025-06-16

## 2025-07-14 DIAGNOSIS — F41.9 ANXIETY: ICD-10-CM

## 2025-07-15 RX ORDER — ESCITALOPRAM OXALATE 10 MG/1
10 TABLET ORAL DAILY
Qty: 30 TABLET | Refills: 0 | OUTPATIENT
Start: 2025-07-15

## 2025-07-23 DIAGNOSIS — I10 PRIMARY HYPERTENSION: ICD-10-CM

## 2025-07-25 RX ORDER — HYDROCHLOROTHIAZIDE 12.5 MG/1
12.5 TABLET ORAL DAILY
Qty: 100 TABLET | Refills: 2 | OUTPATIENT
Start: 2025-07-25

## 2025-08-03 DIAGNOSIS — F43.10 PTSD (POST-TRAUMATIC STRESS DISORDER): ICD-10-CM

## 2025-08-05 RX ORDER — TRAZODONE HYDROCHLORIDE 100 MG/1
200 TABLET ORAL
Qty: 60 TABLET | Refills: 0 | OUTPATIENT
Start: 2025-08-05

## (undated) DEVICE — SPECIMEN CONTAINER STERILE PEEL PACK

## (undated) DEVICE — HOOD: Brand: FLYTE, SURGICOOL

## (undated) DEVICE — WEBRIL 6 IN UNSTERILE

## (undated) DEVICE — CEMENT MIXING SYSTEM WITH FEMORAL BREAKWAY NOZZLE: Brand: REVOLUTION

## (undated) DEVICE — GLOVE INDICATOR PI UNDERGLOVE SZ 8.5 BLUE

## (undated) DEVICE — SUT STRATAFIX SPIRAL PLUS 3-0 PS-2 30 X 30 CM SXMP2B408

## (undated) DEVICE — NEEDLE 18 G X 1 1/2

## (undated) DEVICE — SUT VICRYL 1 CT-1 27 IN J261H

## (undated) DEVICE — 3M™ STERI-DRAPE™ U-DRAPE 1015: Brand: STERI-DRAPE™

## (undated) DEVICE — GLOVE PI ULTRA TOUCH SZ 6

## (undated) DEVICE — SUT VICRYL 2-0 CT-1 36 IN J945H

## (undated) DEVICE — SCD SEQUENTIAL COMPRESSION COMFORT SLEEVE MEDIUM KNEE LENGTH: Brand: KENDALL SCD

## (undated) DEVICE — SYRINGE 30ML LL

## (undated) DEVICE — BETHLEHEM UNIV TOTAL KNEE, KIT: Brand: CARDINAL HEALTH

## (undated) DEVICE — DRESSING MEPILEX AG BORDER POST-OP 4 X 12 IN

## (undated) DEVICE — GLOVE INDICATOR PI UNDERGLOVE SZ 7 BLUE

## (undated) DEVICE — COBAN 6 IN STERILE

## (undated) DEVICE — 450 ML BOTTLE OF 0.05% CHLORHEXIDINE GLUCONATE IN 99.95% STERILE WATER FOR IRRIGATION, USP AND APPLICATOR.: Brand: IRRISEPT ANTIMICROBIAL WOUND LAVAGE

## (undated) DEVICE — SINGLE-USE BIOPSY FORCEPS: Brand: RADIAL JAW 4

## (undated) DEVICE — ADHESIVE SKIN HIGH VISCOSITY EXOFIN 1ML

## (undated) DEVICE — GLOVE PI ULTRA TOUCH SZ.8.0

## (undated) DEVICE — CAPIT KNEE ATTUNE FB MEDIAL STAB AOX POR

## (undated) DEVICE — SUT STRATAFIX SPIRAL PDS PLUS 1 CTX 18 IN SXPP1A400

## (undated) DEVICE — PLUMEPEN PRO 10FT

## (undated) DEVICE — ACE WRAP 6 IN UNSTERILE

## (undated) DEVICE — SURGICAL GOWN, XL SMARTSLEEVE: Brand: CONVERTORS

## (undated) DEVICE — HANDPIECE SET WITH RETRACTABLE COAXIAL FAN SPRAY TIP AND SUCTION TUBE: Brand: INTERPULSE

## (undated) DEVICE — GLOVE INDICATOR PI UNDERGLOVE SZ 6.5 BLUE

## (undated) DEVICE — 4-PORT MANIFOLD: Brand: NEPTUNE 2

## (undated) DEVICE — GLOVE PI ULTRA TOUCH SZ.7.0

## (undated) DEVICE — IMPERVIOUS STOCKINETTE: Brand: DEROYAL

## (undated) DEVICE — GLOVE PI ULTRA TOUCH SZ.8.5

## (undated) DEVICE — INTENDED FOR TISSUE SEPARATION, AND OTHER PROCEDURES THAT REQUIRE A SHARP SURGICAL BLADE TO PUNCTURE OR CUT.: Brand: BARD-PARKER ® CARBON RIB-BACK BLADES

## (undated) DEVICE — SAW BLADE OSCILLATING BRAZOL 167